# Patient Record
Sex: FEMALE | Race: WHITE | NOT HISPANIC OR LATINO | Employment: OTHER | URBAN - METROPOLITAN AREA
[De-identification: names, ages, dates, MRNs, and addresses within clinical notes are randomized per-mention and may not be internally consistent; named-entity substitution may affect disease eponyms.]

---

## 2017-01-03 ENCOUNTER — TELEPHONE (OUTPATIENT)
Dept: HEMATOLOGY/ONCOLOGY | Facility: CLINIC | Age: 82
End: 2017-01-03

## 2017-01-03 NOTE — TELEPHONE ENCOUNTER
Per Dr. Arredondo, confirmed for pt that she is in remission and still needs to take her last chemo treatment. Pt verbalized understanding.    ----- Message from Radha Womack sent at 1/3/2017  8:53 AM CST -----  Contact: self/[phone]  Patient would like to know if she received the correct information about her cancer being in remission because she is not sure if she needs to take her last treatment.  Please advise

## 2017-01-05 ENCOUNTER — OFFICE VISIT (OUTPATIENT)
Dept: CARDIOLOGY | Facility: CLINIC | Age: 82
End: 2017-01-05
Payer: MEDICARE

## 2017-01-05 VITALS
HEART RATE: 81 BPM | SYSTOLIC BLOOD PRESSURE: 180 MMHG | DIASTOLIC BLOOD PRESSURE: 81 MMHG | WEIGHT: 140 LBS | HEIGHT: 60 IN | BODY MASS INDEX: 27.48 KG/M2

## 2017-01-05 DIAGNOSIS — I10 HTN (HYPERTENSION), BENIGN: Primary | ICD-10-CM

## 2017-01-05 DIAGNOSIS — C83.30 DLBCL (DIFFUSE LARGE B CELL LYMPHOMA): ICD-10-CM

## 2017-01-05 DIAGNOSIS — G40.A09 NONINTRACTABLE ABSENCE EPILEPSY WITHOUT STATUS EPILEPTICUS: Chronic | ICD-10-CM

## 2017-01-05 DIAGNOSIS — T45.1X5A ANEMIA DUE TO ANTINEOPLASTIC CHEMOTHERAPY: ICD-10-CM

## 2017-01-05 DIAGNOSIS — I10 ESSENTIAL HYPERTENSION: Chronic | ICD-10-CM

## 2017-01-05 DIAGNOSIS — Z98.890 S/P EXPLORATORY LAPAROTOMY: ICD-10-CM

## 2017-01-05 DIAGNOSIS — D64.81 ANEMIA DUE TO ANTINEOPLASTIC CHEMOTHERAPY: ICD-10-CM

## 2017-01-05 DIAGNOSIS — E03.9 ACQUIRED HYPOTHYROIDISM: Chronic | ICD-10-CM

## 2017-01-05 PROCEDURE — 99213 OFFICE O/P EST LOW 20 MIN: CPT | Mod: PBBFAC,PO | Performed by: INTERNAL MEDICINE

## 2017-01-05 PROCEDURE — 99999 PR PBB SHADOW E&M-EST. PATIENT-LVL III: CPT | Mod: PBBFAC,,, | Performed by: INTERNAL MEDICINE

## 2017-01-05 PROCEDURE — 99204 OFFICE O/P NEW MOD 45 MIN: CPT | Mod: S$PBB,,, | Performed by: INTERNAL MEDICINE

## 2017-01-05 RX ORDER — CHLORTHALIDONE 25 MG/1
25 TABLET ORAL DAILY
Qty: 30 TABLET | Refills: 11 | Status: ON HOLD | OUTPATIENT
Start: 2017-01-05 | End: 2017-01-27 | Stop reason: HOSPADM

## 2017-01-05 RX ORDER — BENZONATATE 100 MG/1
100 CAPSULE ORAL 3 TIMES DAILY PRN
COMMUNITY
End: 2017-01-16 | Stop reason: ALTCHOICE

## 2017-01-05 NOTE — PROGRESS NOTES
Subjective:    Patient ID:  Annette Garcia is a 81 y.o. female who presents for evaluation of Consult and Hypertension      HPI  80 y/o female with hx of difficult to control HTN, anemia, acquired hypothyroidism, diffuse large B-cell lymphoma on chemo (last PET showed remission) and has one more chemo session scheduled for later this month. She presents for evaluation and management of HTN. She denies CP, dizziness, lightheadedness, syncope, orthopnea, PND, palps. No hx of JESSENIA and does not smoke. BP has been elevated during Chemo visits with 's. BP diary available and SBP ranging from 150's-190's with average in the high 160's and HR 70-80's. Follows low salt diet.     Review of Systems   Constitution: Positive for weakness. Negative for malaise/fatigue.   HENT: Negative for congestion and headaches.    Eyes: Negative for blurred vision.   Cardiovascular: Positive for leg swelling. Negative for chest pain, claudication, cyanosis, dyspnea on exertion, irregular heartbeat, near-syncope, orthopnea, palpitations, paroxysmal nocturnal dyspnea and syncope.   Respiratory: Negative for shortness of breath.    Endocrine: Negative for polyuria.   Hematologic/Lymphatic: Negative for bleeding problem.   Skin: Negative for itching and rash.   Musculoskeletal: Negative for joint swelling, muscle cramps and muscle weakness.   Gastrointestinal: Negative for abdominal pain, hematemesis, hematochezia, melena, nausea and vomiting.   Genitourinary: Negative for dysuria and hematuria.   Neurological: Negative for dizziness, focal weakness, light-headedness and loss of balance.   Psychiatric/Behavioral: Negative for depression. The patient is not nervous/anxious.         Objective:    Physical Exam   Constitutional: She is oriented to person, place, and time. She appears well-developed and well-nourished.   HENT:   Head: Normocephalic and atraumatic.   Neck: Neck supple. No JVD present.   Cardiovascular: Normal rate, regular  rhythm and normal heart sounds.    Pulses:       Carotid pulses are 2+ on the right side, and 2+ on the left side.       Radial pulses are 2+ on the right side, and 2+ on the left side.        Femoral pulses are 2+ on the right side, and 2+ on the left side.       Dorsalis pedis pulses are 2+ on the right side, and 2+ on the left side.        Posterior tibial pulses are 2+ on the right side, and 2+ on the left side.   Pulmonary/Chest: Effort normal and breath sounds normal.   Abdominal: Soft. Bowel sounds are normal.   Musculoskeletal: She exhibits edema.   Neurological: She is alert and oriented to person, place, and time.   Skin: Skin is warm and dry.   Psychiatric: She has a normal mood and affect. Her behavior is normal. Thought content normal.         Assessment:       1. HTN (hypertension), benign    2. Nonintractable absence epilepsy without status epilepticus    3. Essential hypertension    4. Anemia due to antineoplastic chemotherapy    5. DLBCL (diffuse large B cell lymphoma)    6. Acquired hypothyroidism    7. S/P exploratory laparotomy        80 y/o female with hx and presentation as above. Has difficult to control HTN. Will continue lisinopril 40 mg and carvedilol 12.5 mg BiD. Will add chlorthalidone 25 mg and assess response. BMP next week. Will order renal artery doppler to evaluate for renal artery stenosis. She was told to call clinic in 1 week with BP readings and will decide medication adjustment at that time.        Plan:       -Start chlorthalidone 25 mg daily  -Renal artery doppler  -BMP next week  -Pt to call clinic in 1 week with BP results

## 2017-01-05 NOTE — LETTER
January 5, 2017      Tyson Arredondo MD  [address] [place] [address]           [facility]  [address] [place] [address], [address]  [facility] [address]  Phone: [phone]  Fax: [phone]          Patient: Annette Garcia   MR Number: [id number]   YOB: 1935   Date of Visit: 1/5/2017       Dear Dr. Tyson Arredondo:    Thank you for referring Annette Garcia to me for evaluation. Attached you will find relevant portions of my assessment and plan of care.    If you have questions, please do not hesitate to call me. I look forward to following Annette Garcia along with you.    Sincerely,    Timmy Simmons MD    Enclosure  CC:  No Recipients    If you would like to receive this communication electronically, please contact [email or web address] or [phone] to request more information on [employer] access.    For providers and/or their staff who would like to refer a patient to [facility], please contact us through our one-stop-shop provider referral line, [facility], at [phone].    If you feel you have received this communication in error or would no longer like to receive these types of communications, please e-mail [email or web address]

## 2017-01-12 ENCOUNTER — HOSPITAL ENCOUNTER (OUTPATIENT)
Dept: RADIOLOGY | Facility: HOSPITAL | Age: 82
Discharge: HOME | End: 2017-01-12
Attending: INTERNAL MEDICINE
Payer: MEDICARE

## 2017-01-12 DIAGNOSIS — I10 HTN (HYPERTENSION), BENIGN: ICD-10-CM

## 2017-01-12 PROCEDURE — 93975 VASCULAR STUDY: CPT | Mod: TC,PO

## 2017-01-16 ENCOUNTER — LAB VISIT (OUTPATIENT)
Dept: LAB | Facility: HOSPITAL | Age: 82
End: 2017-01-16
Attending: INTERNAL MEDICINE
Payer: MEDICARE

## 2017-01-16 ENCOUNTER — OFFICE VISIT (OUTPATIENT)
Dept: HEMATOLOGY/ONCOLOGY | Facility: CLINIC | Age: 82
End: 2017-01-16
Payer: MEDICARE

## 2017-01-16 VITALS
BODY MASS INDEX: 29.33 KG/M2 | WEIGHT: 139.75 LBS | TEMPERATURE: 98 F | DIASTOLIC BLOOD PRESSURE: 60 MMHG | HEART RATE: 69 BPM | SYSTOLIC BLOOD PRESSURE: 130 MMHG | OXYGEN SATURATION: 96 % | HEIGHT: 58 IN

## 2017-01-16 DIAGNOSIS — D53.9 NUTRITIONAL ANEMIA: ICD-10-CM

## 2017-01-16 DIAGNOSIS — D64.81 ANEMIA DUE TO ANTINEOPLASTIC CHEMOTHERAPY: ICD-10-CM

## 2017-01-16 DIAGNOSIS — Z51.11 ENCOUNTER FOR ANTINEOPLASTIC CHEMOTHERAPY: ICD-10-CM

## 2017-01-16 DIAGNOSIS — T45.1X5A ANEMIA DUE TO ANTINEOPLASTIC CHEMOTHERAPY: ICD-10-CM

## 2017-01-16 DIAGNOSIS — C83.30 DLBCL (DIFFUSE LARGE B CELL LYMPHOMA): Primary | ICD-10-CM

## 2017-01-16 DIAGNOSIS — C83.30 DLBCL (DIFFUSE LARGE B CELL LYMPHOMA): ICD-10-CM

## 2017-01-16 DIAGNOSIS — I10 ESSENTIAL HYPERTENSION: Chronic | ICD-10-CM

## 2017-01-16 DIAGNOSIS — I70.1 BILATERAL RENAL ARTERY STENOSIS: ICD-10-CM

## 2017-01-16 LAB
ALBUMIN SERPL BCP-MCNC: 3.6 G/DL
ALP SERPL-CCNC: 67 U/L
ALT SERPL W/O P-5'-P-CCNC: 7 U/L
ANION GAP SERPL CALC-SCNC: 9 MMOL/L
AST SERPL-CCNC: 12 U/L
BASOPHILS # BLD AUTO: 0.02 K/UL
BASOPHILS NFR BLD: 0.4 %
BILIRUB SERPL-MCNC: 0.2 MG/DL
BUN SERPL-MCNC: 24 MG/DL
CALCIUM SERPL-MCNC: 9.1 MG/DL
CHLORIDE SERPL-SCNC: 93 MMOL/L
CO2 SERPL-SCNC: 28 MMOL/L
CREAT SERPL-MCNC: 0.8 MG/DL
DIFFERENTIAL METHOD: ABNORMAL
EOSINOPHIL # BLD AUTO: 0 K/UL
EOSINOPHIL NFR BLD: 0.7 %
ERYTHROCYTE [DISTWIDTH] IN BLOOD BY AUTOMATED COUNT: 16 %
EST. GFR  (AFRICAN AMERICAN): >60 ML/MIN/1.73 M^2
EST. GFR  (NON AFRICAN AMERICAN): >60 ML/MIN/1.73 M^2
GLUCOSE SERPL-MCNC: 132 MG/DL
HCT VFR BLD AUTO: 24.7 %
HGB BLD-MCNC: 8.4 G/DL
LDH SERPL L TO P-CCNC: 140 U/L
LYMPHOCYTES # BLD AUTO: 0.4 K/UL
LYMPHOCYTES NFR BLD: 9.1 %
MAGNESIUM SERPL-MCNC: 1.6 MG/DL
MCH RBC QN AUTO: 35.6 PG
MCHC RBC AUTO-ENTMCNC: 34 %
MCV RBC AUTO: 105 FL
MONOCYTES # BLD AUTO: 0.6 K/UL
MONOCYTES NFR BLD: 14 %
NEUTROPHILS # BLD AUTO: 3.4 K/UL
NEUTROPHILS NFR BLD: 75.6 %
PHOSPHATE SERPL-MCNC: 4.3 MG/DL
PLATELET # BLD AUTO: 202 K/UL
PMV BLD AUTO: 10 FL
POTASSIUM SERPL-SCNC: 3.8 MMOL/L
PROT SERPL-MCNC: 6 G/DL
RBC # BLD AUTO: 2.36 M/UL
SODIUM SERPL-SCNC: 130 MMOL/L
URATE SERPL-MCNC: 5.5 MG/DL
WBC # BLD AUTO: 4.51 K/UL

## 2017-01-16 PROCEDURE — 99213 OFFICE O/P EST LOW 20 MIN: CPT | Mod: PBBFAC,PO | Performed by: INTERNAL MEDICINE

## 2017-01-16 PROCEDURE — 36415 COLL VENOUS BLD VENIPUNCTURE: CPT

## 2017-01-16 PROCEDURE — 84100 ASSAY OF PHOSPHORUS: CPT

## 2017-01-16 PROCEDURE — 99214 OFFICE O/P EST MOD 30 MIN: CPT | Mod: S$PBB,,, | Performed by: INTERNAL MEDICINE

## 2017-01-16 PROCEDURE — 85025 COMPLETE CBC W/AUTO DIFF WBC: CPT

## 2017-01-16 PROCEDURE — 83615 LACTATE (LD) (LDH) ENZYME: CPT

## 2017-01-16 PROCEDURE — 84550 ASSAY OF BLOOD/URIC ACID: CPT

## 2017-01-16 PROCEDURE — 83735 ASSAY OF MAGNESIUM: CPT

## 2017-01-16 PROCEDURE — 99999 PR PBB SHADOW E&M-EST. PATIENT-LVL III: CPT | Mod: PBBFAC,,, | Performed by: INTERNAL MEDICINE

## 2017-01-16 PROCEDURE — 80053 COMPREHEN METABOLIC PANEL: CPT

## 2017-01-16 RX ORDER — ACETAMINOPHEN 325 MG/1
650 TABLET ORAL
Status: CANCELLED | OUTPATIENT
Start: 2017-01-17

## 2017-01-16 RX ORDER — HEPARIN 100 UNIT/ML
500 SYRINGE INTRAVENOUS
Status: CANCELLED | OUTPATIENT
Start: 2017-01-17

## 2017-01-16 RX ORDER — DOXORUBICIN HYDROCHLORIDE 2 MG/ML
50 INJECTION, SOLUTION INTRAVENOUS
Status: CANCELLED | OUTPATIENT
Start: 2017-01-17

## 2017-01-16 RX ORDER — FAMOTIDINE 10 MG/ML
20 INJECTION INTRAVENOUS
Status: CANCELLED | OUTPATIENT
Start: 2017-01-17

## 2017-01-16 RX ORDER — SODIUM CHLORIDE 0.9 % (FLUSH) 0.9 %
10 SYRINGE (ML) INJECTION
Status: CANCELLED | OUTPATIENT
Start: 2017-01-17

## 2017-01-16 NOTE — PROGRESS NOTES
Subjective:       Patient ID: Annette Garcia is a 81 y.o. female.    Chief Complaint: DLBCL (stat lab results)    HPI     She is here for follow-up of Diffuse large B-cell lymphoma.  She was having abdominal discomfort for over 4 months and a CT scan done on 8/15/16 showed dilated loops of small bowel. She underwent explorative laparotomy (8/23) with resection of a segment of her small bowel by  - 2 different areas of strictures was noticed intraoperatively. Pathology revealed diffuse large B-cell lymphoma - germinal center origin (CD10 negative, BCL6 positive, MUM1 negative). Ki-67 was elevated at 75%.    PET scan showed stage III disease. No lymphoma on bone marrow biopsy.    She started R-CHOP chemotherapy 10/4/16.    PET scan (12/20/16) - Complete resolution of the hypermetabolic abnormality.     Overall tolerating chemo well.    Review of Systems   Constitutional: Negative for appetite change, fatigue, fever and unexpected weight change.   HENT: Negative for facial swelling and nosebleeds.    Eyes: Negative for photophobia and pain.   Respiratory: Negative for cough and shortness of breath.    Cardiovascular: Negative for chest pain and leg swelling.   Gastrointestinal: Negative for abdominal pain, blood in stool and nausea.   Genitourinary: Negative for dysuria and hematuria.   Skin: Negative for color change and rash.   Neurological: Negative for seizures, weakness and headaches.   Hematological: Negative for adenopathy. Does not bruise/bleed easily.       Objective:      Physical Exam   Constitutional: She is oriented to person, place, and time. She appears well-developed and well-nourished. No distress.   HENT:   Head: Normocephalic and atraumatic.   Right Ear: Tympanic membrane, external ear and ear canal normal.   Left Ear: Tympanic membrane, external ear and ear canal normal.   Nose: Nose normal. No mucosal edema, sinus tenderness, septal deviation or nasal septal hematoma. No epistaxis.  Right sinus exhibits no maxillary sinus tenderness and no frontal sinus tenderness. Left sinus exhibits no maxillary sinus tenderness and no frontal sinus tenderness.   Mouth/Throat: Oropharynx is clear and moist and mucous membranes are normal. Mucous membranes are not cyanotic. No oral lesions. No dental caries. No oropharyngeal exudate.   Hard and soft palate, tongue and posterior pharyngeal wall unremarkable   Eyes: Conjunctivae and lids are normal. No scleral icterus.   Neck: Trachea normal. Neck supple. No tracheal tenderness present. No tracheal deviation present. No thyroid mass (thyroid is non-tender) present.   Cardiovascular: Normal rate, regular rhythm, normal heart sounds, intact distal pulses and normal pulses.  Exam reveals no gallop.    No murmur heard.  No edema   Pulmonary/Chest: Effort normal and breath sounds normal. No accessory muscle usage or stridor. No respiratory distress. She has no decreased breath sounds. She has no wheezes. She has no rhonchi. She has no rales.   Abdominal: Soft. Bowel sounds are normal. She exhibits no distension and no mass. There is no hepatosplenomegaly, splenomegaly or hepatomegaly. There is no tenderness.       Musculoskeletal: She exhibits no edema or tenderness.   Lymphadenopathy:        Head (right side): No submental and no submandibular adenopathy present.        Head (left side): No submental and no submandibular adenopathy present.     She has no cervical adenopathy.     She has no axillary adenopathy.        Right: No supraclavicular adenopathy present.        Left: No supraclavicular adenopathy present.   Neurological: She is alert and oriented to person, place, and time. She has normal strength. She is not disoriented. No cranial nerve deficit or sensory deficit.   Skin: Skin is warm and intact. No petechiae and no rash noted. She is not diaphoretic. No cyanosis. No pallor. Nails show no clubbing.   Psychiatric: She has a normal mood and affect. Her  speech is normal and behavior is normal. Judgment and thought content normal. Her mood appears not anxious. She expresses no homicidal and no suicidal ideation.       Assessment:     1. DLBCL (diffuse large B cell lymphoma)    2. Encounter for antineoplastic chemotherapy    3. Essential hypertension    4. Bilateral renal artery stenosis    5. Anemia due to antineoplastic chemotherapy          Plan:   In summary this is a 81-year-old female with stage III diffuse large B-cell lymphoma.  Labs reviewed.  Worsening anemia from chemotherapy noted.  Will monitor and continue to hold off on starting procrit.  She is asymptomatic.    Proceed with cycle #6 of Rituxan-CHOP scheduled for tomorrow with growth factor support.  This will be her last chemotherapy.    She'll continue to follow-up with Dr. Simmons, cardiology.  Recent renal ultrasound shows bilateral renal artery stenosis.    She will continue bowel regimen of MiraLAX and Colace to prevent constipation    Given worsening anemia secondary to chemotherapy, will repeat her blood counts along with a B12 and folate level in 6 weeks.  Plan to repeat a PET scan in 3 months.  Will discuss port removal after the next PET scan.

## 2017-01-17 ENCOUNTER — INFUSION (OUTPATIENT)
Dept: INFUSION THERAPY | Facility: HOSPITAL | Age: 82
End: 2017-01-17
Attending: INTERNAL MEDICINE
Payer: MEDICARE

## 2017-01-17 VITALS
SYSTOLIC BLOOD PRESSURE: 163 MMHG | DIASTOLIC BLOOD PRESSURE: 73 MMHG | WEIGHT: 139.75 LBS | RESPIRATION RATE: 18 BRPM | BODY MASS INDEX: 29.33 KG/M2 | TEMPERATURE: 97 F | HEART RATE: 68 BPM | HEIGHT: 58 IN

## 2017-01-17 DIAGNOSIS — I10 ESSENTIAL HYPERTENSION: Primary | ICD-10-CM

## 2017-01-17 DIAGNOSIS — C83.30 DLBCL (DIFFUSE LARGE B CELL LYMPHOMA): ICD-10-CM

## 2017-01-17 DIAGNOSIS — Z51.11 ENCOUNTER FOR ANTINEOPLASTIC CHEMOTHERAPY: Primary | ICD-10-CM

## 2017-01-17 PROCEDURE — 96413 CHEMO IV INFUSION 1 HR: CPT

## 2017-01-17 PROCEDURE — 96417 CHEMO IV INFUS EACH ADDL SEQ: CPT

## 2017-01-17 PROCEDURE — 96415 CHEMO IV INFUSION ADDL HR: CPT

## 2017-01-17 PROCEDURE — 25000003 PHARM REV CODE 250: Performed by: INTERNAL MEDICINE

## 2017-01-17 PROCEDURE — 96411 CHEMO IV PUSH ADDL DRUG: CPT

## 2017-01-17 PROCEDURE — 96377 APPLICATON ON-BODY INJECTOR: CPT

## 2017-01-17 PROCEDURE — 63600175 PHARM REV CODE 636 W HCPCS: Performed by: INTERNAL MEDICINE

## 2017-01-17 PROCEDURE — 96375 TX/PRO/DX INJ NEW DRUG ADDON: CPT

## 2017-01-17 PROCEDURE — 96367 TX/PROPH/DG ADDL SEQ IV INF: CPT

## 2017-01-17 RX ORDER — SODIUM CHLORIDE 0.9 % (FLUSH) 0.9 %
10 SYRINGE (ML) INJECTION
Status: DISCONTINUED | OUTPATIENT
Start: 2017-01-17 | End: 2017-01-17 | Stop reason: HOSPADM

## 2017-01-17 RX ORDER — ACETAMINOPHEN 325 MG/1
650 TABLET ORAL
Status: COMPLETED | OUTPATIENT
Start: 2017-01-17 | End: 2017-01-17

## 2017-01-17 RX ORDER — HEPARIN 100 UNIT/ML
500 SYRINGE INTRAVENOUS
Status: DISCONTINUED | OUTPATIENT
Start: 2017-01-17 | End: 2017-01-17 | Stop reason: HOSPADM

## 2017-01-17 RX ORDER — MEPERIDINE HYDROCHLORIDE 50 MG/ML
25 INJECTION INTRAMUSCULAR; INTRAVENOUS; SUBCUTANEOUS
Status: DISCONTINUED | OUTPATIENT
Start: 2017-01-17 | End: 2017-01-17 | Stop reason: HOSPADM

## 2017-01-17 RX ORDER — FAMOTIDINE 10 MG/ML
20 INJECTION INTRAVENOUS
Status: COMPLETED | OUTPATIENT
Start: 2017-01-17 | End: 2017-01-17

## 2017-01-17 RX ORDER — DOXORUBICIN HYDROCHLORIDE 2 MG/ML
50 INJECTION, SOLUTION INTRAVENOUS
Status: COMPLETED | OUTPATIENT
Start: 2017-01-17 | End: 2017-01-17

## 2017-01-17 RX ADMIN — DIPHENHYDRAMINE HYDROCHLORIDE 50 MG: 50 INJECTION, SOLUTION INTRAMUSCULAR; INTRAVENOUS at 08:01

## 2017-01-17 RX ADMIN — CYCLOPHOSPHAMIDE 1215 MG: 1 INJECTION, POWDER, FOR SOLUTION INTRAVENOUS; ORAL at 12:01

## 2017-01-17 RX ADMIN — RITUXIMAB 608 MG: 10 INJECTION, SOLUTION INTRAVENOUS at 09:01

## 2017-01-17 RX ADMIN — ACETAMINOPHEN 650 MG: 325 TABLET ORAL at 08:01

## 2017-01-17 RX ADMIN — VINCRISTINE SULFATE 2 MG: 1 INJECTION, SOLUTION INTRAVENOUS at 12:01

## 2017-01-17 RX ADMIN — PALONOSETRON HYDROCHLORIDE 0.25 MG: 0.25 INJECTION INTRAVENOUS at 09:01

## 2017-01-17 RX ADMIN — HEPARIN SODIUM (PORCINE) LOCK FLUSH IV SOLN 100 UNIT/ML 500 UNITS: 100 SOLUTION at 01:01

## 2017-01-17 RX ADMIN — FAMOTIDINE 20 MG: 10 INJECTION INTRAVENOUS at 08:01

## 2017-01-17 RX ADMIN — SODIUM CHLORIDE: 0.9 INJECTION, SOLUTION INTRAVENOUS at 08:01

## 2017-01-17 RX ADMIN — DOXORUBICIN HYDROCHLORIDE 82 MG: 2 INJECTION, SOLUTION INTRAVENOUS at 12:01

## 2017-01-17 RX ADMIN — PEGFILGRASTIM 6 MG: KIT SUBCUTANEOUS at 01:01

## 2017-01-17 NOTE — TELEPHONE ENCOUNTER
----- Message from Timmy Simmons MD sent at 1/13/2017  1:46 PM CST -----  Please let Ms Garcia know that her labs are acceptable. Please also let her know that her kidney artery ultrasound showed possible blockages in her kidney arteries that we will discuss further at her 1 month clinic appointment. Also she  Needs to increase coreg to 25 mg BiD after I reviewed her BP log.  Thanks  ALEX

## 2017-01-17 NOTE — TELEPHONE ENCOUNTER
----- Message from Kath Young sent at 1/13/2017 12:23 PM CST -----  Patient's daughter, Beatris, called.  Patient no. [phone]    Please call with lab results and ultrasound results.   Patient has an appointment with Dr. Arredondo on Monday, 1/16/17.

## 2017-01-17 NOTE — NURSING
Pt tolerated chemo well. No adverse reaction noted. Pt education reinforced on chemo regimen, side effects, what to expect, and when to call _[facility]_. Pt verbalized understanding. I reviewed pt calendar w/ pt and understanding verbalized. PAC deaccessed and flushed w/ NS and heparin per protocol.

## 2017-01-18 RX ORDER — CARVEDILOL 12.5 MG/1
12.5 TABLET ORAL 2 TIMES DAILY WITH MEALS
Qty: 60 TABLET | Refills: 2 | Status: CANCELLED | OUTPATIENT
Start: 2017-01-18 | End: 2018-01-18

## 2017-01-18 NOTE — TELEPHONE ENCOUNTER
----- Message from Allie Blancas sent at 1/17/2017  3:32 PM CST -----  Contact: daughter /[phone]  Pt is awaiting her new prescription for carvedilol (COREG) 12.5 MG tablet/for today  /[facility] [place] location [phone]/Pt request a nurse call back when its done

## 2017-01-18 NOTE — TELEPHONE ENCOUNTER
----- Message from Kath Young sent at 1/18/2017  9:19 AM CST -----  Patient no. [phone]    Carvedilol 12.5 mg is not at [facility] in [place] yet.   Please authorize.

## 2017-01-19 RX ORDER — CARVEDILOL 25 MG/1
25 TABLET ORAL 2 TIMES DAILY WITH MEALS
COMMUNITY
End: 2017-01-19 | Stop reason: SDUPTHER

## 2017-01-19 RX ORDER — CARVEDILOL 25 MG/1
25 TABLET ORAL 2 TIMES DAILY WITH MEALS
Qty: 60 TABLET | Refills: 11 | Status: SHIPPED | OUTPATIENT
Start: 2017-01-19 | End: 2017-09-14 | Stop reason: SDUPTHER

## 2017-01-26 ENCOUNTER — HOSPITAL ENCOUNTER (INPATIENT)
Facility: HOSPITAL | Age: 82
LOS: 1 days | Discharge: HOME | DRG: 640 | End: 2017-01-27
Attending: EMERGENCY MEDICINE | Admitting: HOSPITALIST
Payer: MEDICARE

## 2017-01-26 ENCOUNTER — OFFICE VISIT (OUTPATIENT)
Dept: CARDIOLOGY | Facility: CLINIC | Age: 82
End: 2017-01-26
Payer: MEDICARE

## 2017-01-26 VITALS
HEIGHT: 58 IN | SYSTOLIC BLOOD PRESSURE: 81 MMHG | BODY MASS INDEX: 28.55 KG/M2 | OXYGEN SATURATION: 99 % | DIASTOLIC BLOOD PRESSURE: 45 MMHG | HEART RATE: 70 BPM | WEIGHT: 136 LBS

## 2017-01-26 DIAGNOSIS — C83.30 DLBCL (DIFFUSE LARGE B CELL LYMPHOMA): ICD-10-CM

## 2017-01-26 DIAGNOSIS — E87.1 DEHYDRATION WITH HYPONATREMIA: ICD-10-CM

## 2017-01-26 DIAGNOSIS — E87.1 HYPONATREMIA: Primary | ICD-10-CM

## 2017-01-26 DIAGNOSIS — E86.0 DEHYDRATION WITH HYPONATREMIA: ICD-10-CM

## 2017-01-26 DIAGNOSIS — D64.81 ANEMIA DUE TO ANTINEOPLASTIC CHEMOTHERAPY: ICD-10-CM

## 2017-01-26 DIAGNOSIS — R53.1 WEAKNESS: ICD-10-CM

## 2017-01-26 DIAGNOSIS — T45.1X5A ANEMIA DUE TO ANTINEOPLASTIC CHEMOTHERAPY: ICD-10-CM

## 2017-01-26 DIAGNOSIS — E86.0 DEHYDRATION: ICD-10-CM

## 2017-01-26 DIAGNOSIS — I70.1 BILATERAL RENAL ARTERY STENOSIS: ICD-10-CM

## 2017-01-26 DIAGNOSIS — I10 ESSENTIAL HYPERTENSION: Primary | Chronic | ICD-10-CM

## 2017-01-26 DIAGNOSIS — D70.1 CHEMOTHERAPY INDUCED NEUTROPENIA: ICD-10-CM

## 2017-01-26 DIAGNOSIS — E86.9 VOLUME DEPLETION, GASTROINTESTINAL LOSS: ICD-10-CM

## 2017-01-26 DIAGNOSIS — D64.9 SYMPTOMATIC ANEMIA: ICD-10-CM

## 2017-01-26 DIAGNOSIS — T45.1X5A CHEMOTHERAPY INDUCED NEUTROPENIA: ICD-10-CM

## 2017-01-26 LAB
ABO GROUP BLD: NORMAL
ALBUMIN SERPL BCP-MCNC: 3.9 G/DL
ALP SERPL-CCNC: 84 U/L
ALT SERPL W/O P-5'-P-CCNC: 16 U/L
ANION GAP SERPL CALC-SCNC: 11 MMOL/L
ANISOCYTOSIS BLD QL SMEAR: SLIGHT
AST SERPL-CCNC: 15 U/L
BASOPHILS # BLD AUTO: ABNORMAL K/UL
BASOPHILS NFR BLD: 0 %
BILIRUB SERPL-MCNC: 0.6 MG/DL
BILIRUB UR QL STRIP: NEGATIVE
BLD GP AB SCN CELLS X3 SERPL QL: NORMAL
BLD PROD TYP BPU: NORMAL
BLD PROD TYP BPU: NORMAL
BLOOD UNIT EXPIRATION DATE: NORMAL
BLOOD UNIT EXPIRATION DATE: NORMAL
BLOOD UNIT TYPE CODE: 5100
BLOOD UNIT TYPE CODE: 5100
BLOOD UNIT TYPE: NORMAL
BLOOD UNIT TYPE: NORMAL
BNP SERPL-MCNC: 60 PG/ML
BUN SERPL-MCNC: 25 MG/DL
CALCIUM SERPL-MCNC: 9.2 MG/DL
CHLORIDE SERPL-SCNC: 88 MMOL/L
CLARITY UR: CLEAR
CO2 SERPL-SCNC: 26 MMOL/L
CODING SYSTEM: NORMAL
CODING SYSTEM: NORMAL
COLOR UR: YELLOW
CREAT SERPL-MCNC: 1 MG/DL
DIFFERENTIAL METHOD: ABNORMAL
DISPENSE STATUS: NORMAL
DISPENSE STATUS: NORMAL
EOSINOPHIL # BLD AUTO: ABNORMAL K/UL
EOSINOPHIL NFR BLD: 4 %
ERYTHROCYTE [DISTWIDTH] IN BLOOD BY AUTOMATED COUNT: 14.5 %
EST. GFR  (AFRICAN AMERICAN): >60 ML/MIN/1.73 M^2
EST. GFR  (NON AFRICAN AMERICAN): 54 ML/MIN/1.73 M^2
FLUAV AG SPEC QL IA: NEGATIVE
FLUBV AG SPEC QL IA: NEGATIVE
GLUCOSE SERPL-MCNC: 136 MG/DL
GLUCOSE UR QL STRIP: NEGATIVE
HCT VFR BLD AUTO: 22.3 %
HGB BLD-MCNC: 7.8 G/DL
HGB UR QL STRIP: NEGATIVE
KETONES UR QL STRIP: NEGATIVE
LACTATE SERPL-SCNC: 2.3 MMOL/L
LEUKOCYTE ESTERASE UR QL STRIP: NEGATIVE
LYMPHOCYTES # BLD AUTO: ABNORMAL K/UL
LYMPHOCYTES NFR BLD: 33.3 %
MCH RBC QN AUTO: 35 PG
MCHC RBC AUTO-ENTMCNC: 35 %
MCV RBC AUTO: 100 FL
METAMYELOCYTES NFR BLD MANUAL: 2.7 %
MONOCYTES # BLD AUTO: ABNORMAL K/UL
MONOCYTES NFR BLD: 33.4 %
MYELOCYTES NFR BLD MANUAL: 1.3 %
NEUTROPHILS NFR BLD: 17.3 %
NEUTS BAND NFR BLD MANUAL: 8 %
NITRITE UR QL STRIP: NEGATIVE
OB PNL STL: NEGATIVE
PH UR STRIP: 6 [PH] (ref 5–8)
PHENOBARB SERPL-MCNC: 10.8 UG/DL
PLATELET # BLD AUTO: 44 K/UL
PLATELET BLD QL SMEAR: ABNORMAL
PMV BLD AUTO: 11.3 FL
POTASSIUM SERPL-SCNC: 4.1 MMOL/L
PROT SERPL-MCNC: 6.4 G/DL
PROT UR QL STRIP: NEGATIVE
RBC # BLD AUTO: 2.23 M/UL
RH BLD: NORMAL
SODIUM SERPL-SCNC: 125 MMOL/L
SP GR UR STRIP: 1.02 (ref 1–1.03)
SPECIMEN SOURCE: NORMAL
TRANS ERYTHROCYTES VOL PATIENT: NORMAL ML
TRANS ERYTHROCYTES VOL PATIENT: NORMAL ML
TROPONIN I SERPL DL<=0.01 NG/ML-MCNC: 0.04 NG/ML
TSH SERPL DL<=0.005 MIU/L-ACNC: 1.97 UIU/ML
URN SPEC COLLECT METH UR: NORMAL
UROBILINOGEN UR STRIP-ACNC: NEGATIVE EU/DL
WBC # BLD AUTO: 0.29 K/UL

## 2017-01-26 PROCEDURE — 81003 URINALYSIS AUTO W/O SCOPE: CPT

## 2017-01-26 PROCEDURE — 80053 COMPREHEN METABOLIC PANEL: CPT

## 2017-01-26 PROCEDURE — 84484 ASSAY OF TROPONIN QUANT: CPT

## 2017-01-26 PROCEDURE — 25000003 PHARM REV CODE 250: Performed by: NURSE PRACTITIONER

## 2017-01-26 PROCEDURE — 83880 ASSAY OF NATRIURETIC PEPTIDE: CPT

## 2017-01-26 PROCEDURE — 11000001 HC ACUTE MED/SURG PRIVATE ROOM

## 2017-01-26 PROCEDURE — 85027 COMPLETE CBC AUTOMATED: CPT

## 2017-01-26 PROCEDURE — 96374 THER/PROPH/DIAG INJ IV PUSH: CPT

## 2017-01-26 PROCEDURE — 99284 EMERGENCY DEPT VISIT MOD MDM: CPT | Mod: 25,27

## 2017-01-26 PROCEDURE — 96361 HYDRATE IV INFUSION ADD-ON: CPT

## 2017-01-26 PROCEDURE — 93005 ELECTROCARDIOGRAM TRACING: CPT

## 2017-01-26 PROCEDURE — 99999 PR PBB SHADOW E&M-EST. PATIENT-LVL III: CPT | Mod: PBBFAC,,, | Performed by: INTERNAL MEDICINE

## 2017-01-26 PROCEDURE — 87040 BLOOD CULTURE FOR BACTERIA: CPT | Mod: 59

## 2017-01-26 PROCEDURE — 86901 BLOOD TYPING SEROLOGIC RH(D): CPT

## 2017-01-26 PROCEDURE — 86850 RBC ANTIBODY SCREEN: CPT

## 2017-01-26 PROCEDURE — 83605 ASSAY OF LACTIC ACID: CPT

## 2017-01-26 PROCEDURE — 99213 OFFICE O/P EST LOW 20 MIN: CPT | Mod: PBBFAC,PO | Performed by: INTERNAL MEDICINE

## 2017-01-26 PROCEDURE — 36430 TRANSFUSION BLD/BLD COMPNT: CPT

## 2017-01-26 PROCEDURE — 86920 COMPATIBILITY TEST SPIN: CPT

## 2017-01-26 PROCEDURE — 99213 OFFICE O/P EST LOW 20 MIN: CPT | Mod: S$PBB,,, | Performed by: INTERNAL MEDICINE

## 2017-01-26 PROCEDURE — 84443 ASSAY THYROID STIM HORMONE: CPT

## 2017-01-26 PROCEDURE — 87400 INFLUENZA A/B EACH AG IA: CPT | Mod: 59

## 2017-01-26 PROCEDURE — 96375 TX/PRO/DX INJ NEW DRUG ADDON: CPT

## 2017-01-26 PROCEDURE — P9021 RED BLOOD CELLS UNIT: HCPCS

## 2017-01-26 PROCEDURE — 36415 COLL VENOUS BLD VENIPUNCTURE: CPT

## 2017-01-26 PROCEDURE — 80184 ASSAY OF PHENOBARBITAL: CPT

## 2017-01-26 PROCEDURE — 82272 OCCULT BLD FECES 1-3 TESTS: CPT

## 2017-01-26 PROCEDURE — 86900 BLOOD TYPING SEROLOGIC ABO: CPT

## 2017-01-26 PROCEDURE — 63600175 PHARM REV CODE 636 W HCPCS: Performed by: NURSE PRACTITIONER

## 2017-01-26 PROCEDURE — 99223 1ST HOSP IP/OBS HIGH 75: CPT | Mod: GC,,, | Performed by: INTERNAL MEDICINE

## 2017-01-26 PROCEDURE — 63600175 PHARM REV CODE 636 W HCPCS: Performed by: EMERGENCY MEDICINE

## 2017-01-26 PROCEDURE — 85007 BL SMEAR W/DIFF WBC COUNT: CPT

## 2017-01-26 PROCEDURE — 83605 ASSAY OF LACTIC ACID: CPT | Mod: 91

## 2017-01-26 RX ORDER — IBUPROFEN 200 MG
16 TABLET ORAL
Status: DISCONTINUED | OUTPATIENT
Start: 2017-01-26 | End: 2017-01-27 | Stop reason: HOSPADM

## 2017-01-26 RX ORDER — PHENOBARBITAL 32.4 MG/1
64.8 TABLET ORAL NIGHTLY
Status: DISCONTINUED | OUTPATIENT
Start: 2017-01-26 | End: 2017-01-27 | Stop reason: HOSPADM

## 2017-01-26 RX ORDER — DOCUSATE SODIUM 100 MG/1
100 CAPSULE, LIQUID FILLED ORAL DAILY
Status: DISCONTINUED | OUTPATIENT
Start: 2017-01-27 | End: 2017-01-27 | Stop reason: HOSPADM

## 2017-01-26 RX ORDER — CARBAMAZEPINE 100 MG/1
200 CAPSULE, EXTENDED RELEASE ORAL NIGHTLY
Status: DISCONTINUED | OUTPATIENT
Start: 2017-01-26 | End: 2017-01-27 | Stop reason: HOSPADM

## 2017-01-26 RX ORDER — DIPHENHYDRAMINE HYDROCHLORIDE 50 MG/ML
12.5 INJECTION INTRAMUSCULAR; INTRAVENOUS
Status: COMPLETED | OUTPATIENT
Start: 2017-01-26 | End: 2017-01-26

## 2017-01-26 RX ORDER — ACETAMINOPHEN 325 MG/1
325 TABLET ORAL EVERY 4 HOURS PRN
Status: DISCONTINUED | OUTPATIENT
Start: 2017-01-26 | End: 2017-01-27 | Stop reason: HOSPADM

## 2017-01-26 RX ORDER — CETIRIZINE HYDROCHLORIDE 5 MG/1
10 TABLET ORAL DAILY
Status: DISCONTINUED | OUTPATIENT
Start: 2017-01-27 | End: 2017-01-27 | Stop reason: HOSPADM

## 2017-01-26 RX ORDER — IBUPROFEN 200 MG
24 TABLET ORAL
Status: DISCONTINUED | OUTPATIENT
Start: 2017-01-26 | End: 2017-01-27 | Stop reason: HOSPADM

## 2017-01-26 RX ORDER — ONDANSETRON 2 MG/ML
4 INJECTION INTRAMUSCULAR; INTRAVENOUS EVERY 12 HOURS PRN
Status: DISCONTINUED | OUTPATIENT
Start: 2017-01-26 | End: 2017-01-27 | Stop reason: HOSPADM

## 2017-01-26 RX ORDER — CODEINE PHOSPHATE AND GUAIFENESIN 10; 100 MG/5ML; MG/5ML
5 SOLUTION ORAL EVERY 4 HOURS PRN
Status: DISCONTINUED | OUTPATIENT
Start: 2017-01-26 | End: 2017-01-26

## 2017-01-26 RX ORDER — ONDANSETRON 4 MG/1
4 TABLET, ORALLY DISINTEGRATING ORAL EVERY 6 HOURS PRN
Status: DISCONTINUED | OUTPATIENT
Start: 2017-01-26 | End: 2017-01-27 | Stop reason: HOSPADM

## 2017-01-26 RX ORDER — SODIUM CHLORIDE 9 MG/ML
INJECTION, SOLUTION INTRAVENOUS CONTINUOUS
Status: DISCONTINUED | OUTPATIENT
Start: 2017-01-26 | End: 2017-01-27 | Stop reason: HOSPADM

## 2017-01-26 RX ORDER — LEVOTHYROXINE SODIUM 50 UG/1
100 TABLET ORAL
Status: DISCONTINUED | OUTPATIENT
Start: 2017-01-27 | End: 2017-01-27 | Stop reason: HOSPADM

## 2017-01-26 RX ORDER — ONDANSETRON 2 MG/ML
4 INJECTION INTRAMUSCULAR; INTRAVENOUS
Status: COMPLETED | OUTPATIENT
Start: 2017-01-26 | End: 2017-01-26

## 2017-01-26 RX ORDER — HYDROCODONE BITARTRATE AND ACETAMINOPHEN 500; 5 MG/1; MG/1
TABLET ORAL
Status: DISCONTINUED | OUTPATIENT
Start: 2017-01-26 | End: 2017-01-27 | Stop reason: HOSPADM

## 2017-01-26 RX ORDER — GLUCAGON 1 MG
1 KIT INJECTION
Status: DISCONTINUED | OUTPATIENT
Start: 2017-01-26 | End: 2017-01-27 | Stop reason: HOSPADM

## 2017-01-26 RX ADMIN — SODIUM CHLORIDE 1000 ML: 0.9 INJECTION, SOLUTION INTRAVENOUS at 10:01

## 2017-01-26 RX ADMIN — DIPHENHYDRAMINE HYDROCHLORIDE 12.5 MG: 50 INJECTION, SOLUTION INTRAMUSCULAR; INTRAVENOUS at 04:01

## 2017-01-26 RX ADMIN — PHENOBARBITAL 64.8 MG: 32.4 TABLET ORAL at 09:01

## 2017-01-26 RX ADMIN — SODIUM CHLORIDE: 0.9 INJECTION, SOLUTION INTRAVENOUS at 03:01

## 2017-01-26 RX ADMIN — ONDANSETRON 4 MG: 2 INJECTION INTRAMUSCULAR; INTRAVENOUS at 10:01

## 2017-01-26 RX ADMIN — CARBAMAZEPINE 200 MG: 100 CAPSULE, EXTENDED RELEASE ORAL at 09:01

## 2017-01-26 NOTE — ASSESSMENT & PLAN NOTE
Difficult to treat hypertension at baseline. On Coreg 25 mg BID, Lisinopril 40 mg daily and chlorthalidone 25 mg BID with meals. Hypotensive on admit. Holding.

## 2017-01-26 NOTE — ED PROVIDER NOTES
Encounter Date: 1/26/2017       History     Chief Complaint   Patient presents with    Fatigue     weakness and hypotension. lymphoma patient, last chemo patient 1/17, followed by Dr. Arredondo     Review of patient's allergies indicates:   Allergen Reactions    Adhesive Itching and Blisters    Penicillins Anaphylaxis    Tramadol      May cause seizures    Avelox [moxifloxacin] Rash     Facial and arm itching and redness. Pt states throat closes when given.    Amoxil [amoxicillin]     Aspridrox [aspirin, buffered]     Codeine     Keflex [cephalexin]     Norvasc [amlodipine]     Red dye Hives    Sulfa (sulfonamide antibiotics)     Tylenol [acetaminophen]      Has reaction to Tylenol with red dye and unable to take Extra Strength Tylenol/ CAN ONLY TOLERATE REG STRENGTH TYLENOL     HPI Comments: 80yo female on Chemo for Non-Hodgkin's lymphoma presents for progressively worse generalized weakness for five days.  Her last chemo was 1/17.  Pt denies fever, chills, sore throat, nasal congestion, CP, SOB, palpitations, abd pain, vomiting, diarrhea, dysuria, and rash.  Pt reports nausea but has not used any antiemetics. Pt's daughter reports that she has not been eating or drinking much since her last treatment.  Pt reports a history of dehydration.      Patient is a 81 y.o. female presenting with the following complaint: general illness. The history is provided by the patient, a relative and medical records.   Illness    The current episode started several days ago. The problem occurs continuously. The problem has been gradually worsening. The pain is at a severity of 0/10. Nothing relieves the symptoms. Exacerbated by: exertion. Associated symptoms include nausea. Pertinent negatives include no fever, no abdominal pain, no diarrhea, no vomiting, no congestion, no ear pain, no headaches, no rhinorrhea, no sore throat, no neck pain, no cough, no shortness of breath, no URI and no rash. Services received include  medications given. Recently, medical care has been given by a specialist.     Past Medical History   Diagnosis Date    Cancer      colon    Hypertension     Petit mal epilepsy 1954    Scoliosis of lumbar spine     Seizures     Unspecified hypothyroidism      Past Medical History Pertinent Negatives   Diagnosis Date Noted    CHF (congestive heart failure) 9/21/2016    COPD (chronic obstructive pulmonary disease) 9/21/2016    Coronary artery disease 9/21/2016    Diabetes mellitus 9/21/2016    Encounter for blood transfusion 9/21/2016    Stroke 9/21/2016     Past Surgical History   Procedure Laterality Date    Cholecystectomy       open    Appendectomy      Tonsillectomy      Vaginal hysterectomy w/ anterior and posterior vaginal repair      Cataract extraction, bilateral Bilateral     Colon surgery      Hysterectomy      Eye surgery Bilateral      cataract removal with lens implant    Portacath placement Right 09/2016    Back surgery  1988     vertebral fracture    Back surgery  02/2013     lumbar L2-5     Family History   Problem Relation Age of Onset    Pancreatic cancer Mother     Hypertension Father     Heart attack Father      Social History   Substance Use Topics    Smoking status: Never Smoker    Smokeless tobacco: None    Alcohol use No     Review of Systems   Constitutional: Positive for activity change and appetite change. Negative for chills, fatigue and fever.   HENT: Negative for congestion, ear pain, rhinorrhea and sore throat.    Respiratory: Negative for cough and shortness of breath.    Cardiovascular: Negative for chest pain.   Gastrointestinal: Positive for nausea. Negative for abdominal pain, diarrhea and vomiting.   Genitourinary: Negative for dysuria.   Musculoskeletal: Negative for back pain and neck pain.   Skin: Negative for rash.   Allergic/Immunologic: Positive for immunocompromised state (Chemo).   Neurological: Positive for weakness (generalized). Negative for  headaches.   Psychiatric/Behavioral: Negative for confusion.   All other systems reviewed and are negative.      Physical Exam   Initial Vitals   BP Pulse Resp Temp SpO2   01/26/17 0930 01/26/17 0930 01/26/17 0930 01/26/17 0930 01/26/17 0930   118/55 64 18 98 °F (36.7 °C) 96 %     Physical Exam    Nursing note and vitals reviewed.  Constitutional: She appears well-developed and well-nourished. She is active and cooperative.  Non-toxic appearance. She has a sickly appearance. She appears ill. She appears distressed (mild).   HENT:   Head: Normocephalic and atraumatic.   Nose: Nose normal.   Mouth/Throat: Uvula is midline. Mucous membranes are pale, dry and not cyanotic. No posterior oropharyngeal erythema.   Eyes: Conjunctivae and EOM are normal.   Neck: Normal range of motion. Neck supple.   Cardiovascular: Normal rate, regular rhythm and normal heart sounds.   Pulmonary/Chest: Effort normal and breath sounds normal.   Abdominal: Soft. Normal appearance and bowel sounds are normal. There is no tenderness.   Neurological: She is alert and oriented to person, place, and time. She has normal strength. GCS eye subscore is 4. GCS verbal subscore is 5. GCS motor subscore is 6.   Skin: Skin is warm, dry and intact. No rash noted. There is pallor.   Psychiatric: She has a normal mood and affect. Her speech is normal and behavior is normal. Judgment and thought content normal. Cognition and memory are normal.         ED Course   Critical Care  Date/Time: 1/26/2017 12:12 PM  Performed by: RAHUL TAN  Authorized by: RAHUL TAN   Direct patient critical care time: 10 minutes  Additional history critical care time: 5 minutes  Ordering / reviewing critical care time: 5 minutes  Documentation critical care time: 5 minutes  Consulting other physicians critical care time: 5 minutes  Total critical care time (exclusive of procedural time) : 30 minutes        Labs Reviewed   CBC W/ AUTO DIFFERENTIAL   COMPREHENSIVE  METABOLIC PANEL   TROPONIN I   URINALYSIS   B-TYPE NATRIURETIC PEPTIDE     EKG Readings: (Independently Interpreted)   Initial Reading: No STEMI. Rhythm: Normal Sinus Rhythm. Heart Rate: 62. Ectopy: No Ectopy. Conduction: Normal. ST Segments: Normal ST Segments.     Imaging Results         X-Ray Chest 1 View (In process)           X-Rays:   Independently Interpreted Readings:   Chest X-Ray: Normal heart size.  No infiltrates.  No acute abnormalities.     Medical Decision Making:   History:   I obtained history from: someone other than patient.       <> Summary of History: Son and daughter  Initial Assessment:   80yo female on Chemo here for progressively worse generalized weakness for five days.  Pt reports decreased PO intake. Denies fever/chills, n/v/d, dysuria, abd pain, vomiting, diarrhea.  Pt appears ill.  Hypotensive.  MM dry. Skin pale, +skin tenting.  HR RRR, lungs CTA and equal. Abd soft, non-tender, bowel sounds normoactive.   Differential Diagnosis:   Dehydration, infection, CHF, STEMI/NStemi, electrolyte abnormality,   Clinical Tests:   Lab Tests: Ordered and Reviewed  Radiological Study: Ordered and Reviewed  Medical Tests: Ordered and Reviewed  ED Management:  Labs, iv fluids, EKG, CXR, UA  Other:   I have discussed this case with another health care provider.       <> Summary of the Discussion: 12:02 PM Dr Cooper, who says admit for observation no antibiotics neutropenia precautions and transfuse 2 units  12:11 PM spoke to Np for [facility] hopitalist    Additional MDM:   Comments: 81-year-old female with history of lymphoma and recent chemotherapy presenting with fatigue likely secondary to dehydration and symptomatic anemia.  Patient has decreased by mouth intake secondary to nausea although she has not taking her Zofran.  She is making urine.  Last bowel movement yesterday.  No coughing no fever no chills.  Also her hemoglobin is a little lower than her baseline over the past few months.  Hemocculted  pending I will transfuse her as recommended by Dr. Cooper and call the hospitalist to admit her..                 ED Course     Clinical Impression:   The primary encounter diagnosis was Hyponatremia. Diagnoses of Weakness, Symptomatic anemia, and Dehydration were also pertinent to this visit.          Shoshana Soares MD  01/26/17 1213

## 2017-01-26 NOTE — PROGRESS NOTES
"Subjective:    Patient ID:  Annette Garcia is a 81 y.o. female who presents for follow-up of Results; Hypertension; Fatigue; Decreased Appetite; Nausea; and Gait Problem      HPI  80 y/o female with hx of difficult to control HTN currently on 3 anti-hypertensives including a diuretic, anemia, acquired hypothyroidism, diffuse large B-cell lymphoma on chemo (last PET showed remission) with last chemo session recently. She presents for f/u HTN.   Since last clinic visit, she was started on chlorthalidone and renal artery doppler obtained. BP log revealed 's-170's. Renal artery doppler with results suggestive of left main 60-99% stenosis, right renal 60-90%, and comparable kidney size (I think there is a typo with the right renal artery measuring 1.1 cm).   Since her last dose of chemo she has felt weak which is normal post chemo for her. However, over the past few days and especially yesterday, she has felt very weak and lightheaded. She becomes dizzy and lightheaded with change in positions. She states that she was "too weak to see the doctor." She appears pale and BP low in clinic today. SBP last clinic visit 180's.  She denies CP, syncope, orthopnea, PND, palps. No hx of JESSENIA and does not smoke. BP diary available and SBP ranging from 150's-190's with average in the high 160's and HR 70-80's until the last few days it has been in the 110's- 130's.     Review of Systems   Constitution: Positive for weakness and malaise/fatigue.   HENT: Negative for congestion and headaches.    Eyes: Negative for blurred vision.   Cardiovascular: Negative for chest pain, claudication, cyanosis, dyspnea on exertion, irregular heartbeat, leg swelling, near-syncope, orthopnea, palpitations, paroxysmal nocturnal dyspnea and syncope.   Respiratory: Negative for shortness of breath.    Endocrine: Negative for polyuria.   Hematologic/Lymphatic: Negative for bleeding problem.   Skin: Negative for itching and rash.   Musculoskeletal: " Negative for joint swelling, muscle cramps and muscle weakness.   Gastrointestinal: Negative for abdominal pain, hematemesis, hematochezia, melena, nausea and vomiting.   Genitourinary: Negative for dysuria and hematuria.   Neurological: Positive for dizziness and light-headedness. Negative for focal weakness and loss of balance.   Psychiatric/Behavioral: Negative for depression. The patient is not nervous/anxious.         Objective:    Physical Exam   Constitutional: She is oriented to person, place, and time. She appears lethargic. She appears ill.   HENT:   Head: Normocephalic and atraumatic.   Neck: Neck supple. No JVD present.   Cardiovascular: Normal rate, regular rhythm and normal heart sounds.    Pulses:       Carotid pulses are 2+ on the right side, and 2+ on the left side.       Radial pulses are 2+ on the right side, and 2+ on the left side.        Femoral pulses are 2+ on the right side, and 2+ on the left side.       Dorsalis pedis pulses are 2+ on the right side, and 2+ on the left side.        Posterior tibial pulses are 2+ on the right side, and 2+ on the left side.   Pulmonary/Chest: Effort normal and breath sounds normal.   Abdominal: Soft. Bowel sounds are normal.   Musculoskeletal: She exhibits no edema.   Neurological: She is oriented to person, place, and time. She appears lethargic.   Skin: Skin is warm and dry.   Psychiatric: She has a normal mood and affect. Her behavior is normal. Thought content normal.         Assessment:       1. Essential hypertension    2. Bilateral renal artery stenosis    3. Anemia due to antineoplastic chemotherapy    4. DLBCL (diffuse large B cell lymphoma)    5. Volume depletion, gastrointestinal loss      80 y/o female with hx and presentation as above. Appears very weak and states that she has progressively been weak since last chemo dose, although yesterday is when she really felt bad. BP 70/30's with repeat SBP 80's. She is likely dehydrated and needs  evaluation in ED. In current immunocompromised state, would not recommend going home and will refer her to ED. We discussed the results of her renal artery doppler and possible etiology of resistant HTN. She is on 3 anti-hypertensives with one being a diuretic and BP still not controlled on good doses. Once she is stable, will discuss renal artery angiogram +/- intervention, but for now she has more immediate issues.        Plan:       -Refer to ED for evaluation of weakness and dehydration in immuno compromised state  -F/u in 1 month

## 2017-01-26 NOTE — ASSESSMENT & PLAN NOTE
Hgb 7.8, Hct 22.3. Patient is symptomatic with fatigue, lightheadedness and hypotension. Transfuse 2 units RBCs and recheck labs.

## 2017-01-26 NOTE — ASSESSMENT & PLAN NOTE
Acute on Chronic Hyponatremia, Dehydration  Sodium 125 today. On 1/16/17 Sodium was 130. Normal Saline IV infusion. Check labs in the morning.

## 2017-01-26 NOTE — IP AVS SNAPSHOT
[facility]  [address]  [place] [address]  Phone: [phone]           Patient Discharge Instructions     Our goal is to set you up for success. This packet includes information on your condition, medications, and your home care. It will help you to care for yourself so you don't get sicker and need to go back to the hospital.     Please ask your nurse if you have any questions.        There are many details to remember when preparing to leave the hospital. Here is what you will need to do:    1. Take your medicine. If you are prescribed medications, review your Medication List in the following pages. You may have new medications to  at the pharmacy and others that you'll need to stop taking. Review the instructions for how and when to take your medications. Talk with your doctor or nurses if you are unsure of what to do.     2. Go to your follow-up appointments. Specific follow-up information is listed in the following pages. Your may be contacted by a transition nurse or clinical provider about future appointments. Be sure we have all of the phone numbers to reach you, if needed. Please contact your provider's office if you are unable to make an appointment.     3. Watch for warning signs. Your doctor or nurse will give you detailed warning signs to watch for and when to call for assistance. These instructions may also include educational information about your condition. If you experience any of warning signs to your health, call your doctor.               [facility]  Unless otherwise directed by your provider, please contact [facility], our nurse care line that is available for 24/7 assistance.     [phone] (toll-free)    Registered nurses in the [facility] provide clinical advisement, health education, appointment booking, and other advisory services.                    ** Verify the list of medication(s) below is accurate and up to date. Carry this  with you in case of emergency. If your medications have changed, please notify your healthcare provider.             Medication List      CONTINUE taking these medications        Additional Info                      acetaminophen 325 MG tablet   Commonly known as:  TYLENOL   Refills:  0   Dose:  325 mg    Instructions:  Take 325 mg by mouth every 4 (four) hours.     Begin Date    AM    Noon    PM    Bedtime       carbamazepine 200 mg tablet   Commonly known as:  EPITOL   Quantity:  90 tablet   Refills:  2   Dose:  200 mg    Instructions:  Take 1 tablet (200 mg total) by mouth nightly.     Begin Date    AM    Noon    PM    Bedtime       carvedilol 25 MG tablet   Commonly known as:  COREG   Quantity:  60 tablet   Refills:  11   Dose:  25 mg    Instructions:  Take 1 tablet (25 mg total) by mouth 2 (two) times daily with meals.     Begin Date    AM    Noon    PM    Bedtime       docusate sodium 100 MG capsule   Commonly known as:  COLACE   Refills:  0   Dose:  100 mg    Instructions:  Take 100 mg by mouth once daily. PT TAKES ONE TABLET 5 TIMES PER WEEK     Begin Date    AM    Noon    PM    Bedtime       levothyroxine 100 MCG tablet   Commonly known as:  SYNTHROID   Quantity:  90 tablet   Refills:  3   Dose:  100 mcg    Last time this was given:  100 mcg on 1/27/2017  5:38 AM   Instructions:  Take 1 tablet (100 mcg total) by mouth once daily.     Begin Date    AM    Noon    PM    Bedtime       lisinopril 40 MG tablet   Commonly known as:  PRINIVIL,ZESTRIL   Quantity:  90 tablet   Refills:  1   Dose:  40 mg    Instructions:  Take 1 tablet (40 mg total) by mouth once daily.     Begin Date    AM    Noon    PM    Bedtime       loratadine 10 mg tablet   Commonly known as:  CLARITIN   Refills:  0   Dose:  10 mg    Instructions:  Take 10 mg by mouth once daily. TAKES WITH CHEMO     Begin Date    AM    Noon    PM    Bedtime       ondansetron 4 MG Tbdl   Commonly known as:  ZOFRAN-ODT   Quantity:  30 tablet   Refills:  1   Dose:   4 mg    Instructions:  Take 1 tablet (4 mg total) by mouth every 6 (six) hours as needed.     Begin Date    AM    Noon    PM    Bedtime       phenobarbital 64.8 MG tablet   Commonly known as:  LUMINAL   Quantity:  90 tablet   Refills:  3   Dose:  64.8 mg    Last time this was given:  64.8 mg on 1/26/2017  9:39 PM   Instructions:  Take 1 tablet (64.8 mg total) by mouth every evening.     Begin Date    AM    Noon    PM    Bedtime       polyethylene glycol 17 gram Pwpk   Commonly known as:  GLYCOLAX   Refills:  0    Instructions:  Take by mouth every other day. TAKES ONCE DAILY DURING THE WEEK OF CHEMO     Begin Date    AM    Noon    PM    Bedtime       predniSONE 50 MG Tab   Commonly known as:  DELTASONE   Quantity:  36 tablet   Refills:  0   Dose:  100 mg    Instructions:  Take 2 tablets (100 mg total) by mouth as directed. Take 2 tabs daily for 3 days with each chemotherapy     Begin Date    AM    Noon    PM    Bedtime       ROBITUSSIN A-C ORAL   Refills:  0    Instructions:  Take by mouth every 12 (twelve) hours.     Begin Date    AM    Noon    PM    Bedtime         STOP taking these medications     chlorthalidone 25 MG Tab   Commonly known as:  HYGROTEN                  Please bring to all follow up appointments:    1. A copy of your discharge instructions.  2. All medicines you are currently taking in their original bottles.  3. Identification and insurance card.    Please arrive 15 minutes ahead of scheduled appointment time.    Please call 24 hours in advance if you must reschedule your appointment and/or time.        Your Scheduled Appointments     Feb 02, 2017  9:40 AM CST   Established Patient Visit with Jose Valles MD   [facility] ([facility])    [address] [place] [address] [phone]            Feb 03, 2017  9:00 AM CST   Established Patient Visit with MD [place] - Hematology Oncology ([address]  [place] [address]    [phone]            Feb 23, 2017 10:20 AM CST   Established Patient Visit with MD Chuy Hall - Cardiology ([facility])    [address], [address]  [place] [address]   [phone]            Mar 03, 2017  8:00 AM CST   Non-Fasting Lab with [facility] [place] [facility] ([facility])    [address]  [place] [address]   [phone]            Mar 07, 2017  8:30 AM CST   Established Patient Visit with MD [place] - Hematology Oncology ([place])    [address]  [place] [id number] [phone]              Follow-up Information     Follow up with Tyson Arredondo MD On 2/3/2017.    Specialties:  Hematology and Oncology, Hematology    Contact information:    [address]  [place] [address]  [phone]          Follow up with Jose Valles MD.    Specialty:  Family Medicine    Contact information:    [address]  [place] [address]  [phone]          Follow up with Timmy Simmons MD On 2/23/2017.    Specialties:  INTERVENTIONAL CARDIOLOGY, Cardiology    Why:  10:20   previously booked       Contact information:    [address]  [address]  [place] [address]  [phone]          Follow up with Jose Valles MD On 2/2/2017.    Specialty:  Family Medicine    Why:  9:40 am         Contact information:    [address]  [place] [address]  [phone]          Discharge Instructions     Future Orders    Activity as tolerated     Comments:    Avoid sick people and wash hands frequently    Call MD for:  difficulty breathing or increased cough     Call MD for:  increased confusion or weakness     Call MD for:  persistent nausea and vomiting or diarrhea     Call MD for:  temperature >100.4     Diet general     Questions:    Total calories:      Fat restriction, if any:      Protein restriction, if any:      Na restriction, if any:      Fluid restriction:      Additional restrictions:  Neutropenic        Discharge  "Instructions       Stop taking Chlorthalidone (Hygroten)    Discharge References/Attachments     NEUTROPENIA (ENGLISH)    ANEMIA, UNDERSTANDING DURING CHEMOTHERAPY (ENGLISH)    THROMBOCYTOPENIA (ENGLISH)    BLOOD TRANSFUSION (ADULT), WHEN YOU NEED A (ENGLISH)    BLOOD AND BLOOD PRODUCT TRANSFUSIONS FOR CANCER (ENGLISH)        Primary Diagnosis     Your primary diagnosis was:  Low Sodium Levels      Admission Information     Date & Time Provider Department CSN    1/26/2017  9:33 AM José Manuel Guidry MD [facility] [id number]      Care Providers     Provider Role Specialty Primary office phone    José Manuel Guidry MD Attending Provider Hospitalist [phone]    José Manuel Guidry MD Physician  Hospitalist  [phone]      Your Vitals Were     BP Pulse Temp Resp Height Weight    149/72 69 98.2 °F (36.8 °C) (Oral) 18 4' 9" (1.448 m) 61.7 kg (136 lb)    SpO2 BMI             98% 29.43 kg/m2         Recent Lab Values     No lab values to display.      Pending Labs     Order Current Status    Prepare RBC 2 Units In process    Blood culture Preliminary result    Blood culture Preliminary result    Prepare RBC Preliminary result      Allergies as of 1/27/2017        Reactions    Adhesive Itching, Blisters    Penicillins Anaphylaxis    Tramadol     May cause seizures    Avelox [Moxifloxacin] Rash    Facial and arm itching and redness. Pt states throat closes when given.    Amoxil [Amoxicillin]     Aspridrox [Aspirin, Buffered]     Codeine     Keflex [Cephalexin]     Norvasc [Amlodipine]     Red Dye Hives    Sulfa (Sulfonamide Antibiotics)     Tylenol [Acetaminophen]     Has reaction to Tylenol with red dye and unable to take Extra Strength Tylenol/ CAN ONLY TOLERATE REG STRENGTH TYLENOL      Advance Directives     An advance directive is a document which, in the event you are no longer able to make decisions for yourself, tells your healthcare team what kind of treatment you do or do not want to receive, or who " you would like to make those decisions for you.  If you do not currently have an advance directive, [facility] encourages you to create one.  For more information call:  [phone]), [phone] ([phone]),  or log on to [email or web address].        Language Assistance Services     ATTENTION: Language assistance services are available, free of charge. Please call [phone].      ATENCIÓN: Si habla español, tiene a cox disposición servicios gratuitos de asistencia lingüística. Llame al [phone].     [facility] Ý: N?u b?n nói Ti?ng Vi?t, có các d?ch v? h? tr? ngôn ng? mi?n phí dành cho b?n. G?i s? [phone].        Blood Transfusion Reaction Signs and Symptoms     The blood you have received has been matched for you as carefully as possible. Most patients who receive a blood transfusion do not experience problems. However, there can be a delayed reaction that happens a few weeks after your blood transfusion. Contact your physician immediately if you experience any NEW SYMPTOMS listed below:     Fever greater than 100.4 degrees    Chills   Yellow color to your skin or eyes(Jaundice)   Back pain, chest pain, or pain at the infusion site   Weakness (more than usual)   Discomfort or uneasiness more than usual (Malaise)   Nausea or vomiting   Shortness of breath, wheezing, or coughing   Higher or lower blood pressure than normal   Skin rash, itching, skin redness, or localized swelling (example: hands or feet)   Urinating less than normal   Urine appears reddish or orange and is darker than normal      Remember that some these signs may already exist for you--such as having chronic back pain or high blood pressure. You only need to look for and report to your doctor any new occurrences since your blood transfusion that are of concern.        Pneumonmia Discharge Instructions                 [facility] complies with applicable Federal civil rights laws and does  not discriminate on the basis of race, color, national origin, age, disability, or sex.

## 2017-01-26 NOTE — MR AVS SNAPSHOT
[facility] Card[facility]  [facility] [address]  [place] [address]  Phone: [phone]  Fax: [phone]                  Annette Garcia   2017 8:00 AM   Office Visit    Description:  Female : 1935   Provider:  Timmy Simmons MD   Department:  [facility] - Cardiology           Reason for Visit     Results     Hypertension     Fatigue     Decreased Appetite     Nausea     Gait Problem                To Do List           Future Appointments        Provider Department Dept Phone    3/3/2017 8:00 AM [facility] [place] [facility] [phone]    3/7/2017 8:30 AM Tyson Arredondo MD [place] - Hematology Oncology [phone]      Goals (5 Years of Data)     None      [facility]     [facility] On Call Nurse Care Line -  Assistance  Registered nurses in the [facility] On Call Center provide clinical advisement, health education, appointment booking, and other advisory services.  Call for this free service at [phone].             Medications                Verify that the below list of medications is an accurate representation of the medications you are currently taking.  If none reported, the list may be blank. If incorrect, please contact your healthcare provider. Carry this list with you in case of emergency.           Current Medications     acetaminophen (TYLENOL) 325 MG tablet Take 325 mg by mouth every 4 (four) hours.    carbamazepine (EPITOL) 200 mg tablet Take 1 tablet (200 mg total) by mouth nightly.    carvedilol (COREG) 25 MG tablet Take 1 tablet (25 mg total) by mouth 2 (two) times daily with meals.    chlorthalidone (HYGROTEN) 25 MG Tab Take 1 tablet (25 mg total) by mouth once daily.    docusate sodium (COLACE) 100 MG capsule Take 100 mg by mouth once daily. PT TAKES ONE TABLET 5 TIMES PER WEEK    GUAIFENESIN/CODEINE PHOSPHATE (ROBITUSSIN A-C ORAL) Take by mouth every 12 (twelve) hours.    levothyroxine (SYNTHROID) 100 MCG tablet Take 1 tablet (100 mcg  "total) by mouth once daily.    lisinopril (PRINIVIL,ZESTRIL) 40 MG tablet Take 1 tablet (40 mg total) by mouth once daily.    loratadine (CLARITIN) 10 mg tablet Take 10 mg by mouth once daily. TAKES WITH CHEMO    ondansetron (ZOFRAN-ODT) 4 MG TbDL Take 1 tablet (4 mg total) by mouth every 6 (six) hours as needed.    phenobarbital (LUMINAL) 64.8 MG tablet Take 1 tablet (64.8 mg total) by mouth every evening.    polyethylene glycol (GLYCOLAX) 17 gram PwPk Take by mouth every other day. TAKES ONCE DAILY DURING THE WEEK OF CHEMO    predniSONE (DELTASONE) 50 MG Tab Take 2 tablets (100 mg total) by mouth as directed. Take 2 tabs daily for 3 days with each chemotherapy           Clinical Reference Information           Vital Signs - Last Recorded  Most recent update: 1/26/2017  8:27 AM by Briana Lyons LPN    BP Pulse Ht Wt SpO2 BMI    (!) 81/45 70 4' 9.5" (1.461 m) 61.7 kg (136 lb) 99% 28.92 kg/m2      Blood Pressure          Most Recent Value    Right Arm BP - Sitting  81/45    Left Arm BP - Sitting  74/43    BP  (!)  81/45      Allergies as of 1/26/2017     Adhesive    Penicillins    Tramadol    Avelox [Moxifloxacin]    Amoxil [Amoxicillin]    Aspridrox [Aspirin, Buffered]    Codeine    Keflex [Cephalexin]    Norvasc [Amlodipine]    Red Dye    Sulfa (Sulfonamide Antibiotics)    Tylenol [Acetaminophen]      Immunizations Administered on Date of Encounter - 1/26/2017     None      [facility] Sign-Up     Activating your [facility] account is as easy as 1-2-3!     1) Visit [email or web address], select Sign Up Now, enter this activation code and your date of birth, then select Next.  [email or web address]  Expires: 3/12/2017  8:44 AM      2) Create a username and password to use when you visit [facility] in the future and select a security question in case you lose your password and select Next.    3) Enter your e-mail address and click Sign Up!    Additional Information  If you have questions, please e-mail [email or web address] or " call [phone] to talk to our [facility] staff. Remember, [facility] is NOT to be used for urgent needs. For medical emergencies, dial 911.

## 2017-01-26 NOTE — ED NOTES
Pt is unsure if her port can be accessed for IV meds and blood now that her chemotherapy finished last week. Message left with Dr. Arredondo's office. Awaiting call back

## 2017-01-26 NOTE — ED NOTES
Pt c/o generalized weakness, fatigue, and decreased appetite for the past few days. States symptoms started on Friday but have worsened. Denies any pain, shortness of breath, or fever. Pt sees Dr. Arredondo, and just finished her final chemotherapy infusion last Tuesday. States she normally gets mild flu-like symptoms a few days after chemo, but states she feels worse than normal in the past couple of days. Generalized weakness noted, but pt is able to move all extremities. Respirations are even, unlabored. Skin is warm, dry. No neurologic deficits noted. Pt denies abdominal pain, but reports intermittent nausea.

## 2017-01-26 NOTE — ASSESSMENT & PLAN NOTE
Petit mal epilepsy since age 19 (last seizure age 26), On Carbomazepine 200 mg nightly and phenobarbital 64.8 mg every other night. Resume. Check phenobarbital level.

## 2017-01-26 NOTE — SUBJECTIVE & OBJECTIVE
Past Medical History   Diagnosis Date    Cancer      colon    Hypertension     Petit mal epilepsy 1954    Scoliosis of lumbar spine     Seizures     Unspecified hypothyroidism        Past Surgical History   Procedure Laterality Date    Cholecystectomy       open    Appendectomy      Tonsillectomy      Vaginal hysterectomy w/ anterior and posterior vaginal repair      Cataract extraction, bilateral Bilateral     Colon surgery      Hysterectomy      Eye surgery Bilateral      cataract removal with lens implant    Portacath placement Right 09/2016    Back surgery  1988     vertebral fracture    Back surgery  02/2013     lumbar L2-5       Review of patient's allergies indicates:   Allergen Reactions    Adhesive Itching and Blisters    Penicillins Anaphylaxis    Tramadol      May cause seizures    Avelox [moxifloxacin] Rash     Facial and arm itching and redness. Pt states throat closes when given.    Amoxil [amoxicillin]     Aspridrox [aspirin, buffered]     Codeine     Keflex [cephalexin]     Norvasc [amlodipine]     Red dye Hives    Sulfa (sulfonamide antibiotics)     Tylenol [acetaminophen]      Has reaction to Tylenol with red dye and unable to take Extra Strength Tylenol/ CAN ONLY TOLERATE REG STRENGTH TYLENOL       No current facility-administered medications on file prior to encounter.      Current Outpatient Prescriptions on File Prior to Encounter   Medication Sig    acetaminophen (TYLENOL) 325 MG tablet Take 325 mg by mouth every 4 (four) hours.    carbamazepine (EPITOL) 200 mg tablet Take 1 tablet (200 mg total) by mouth nightly.    carvedilol (COREG) 25 MG tablet Take 1 tablet (25 mg total) by mouth 2 (two) times daily with meals.    chlorthalidone (HYGROTEN) 25 MG Tab Take 1 tablet (25 mg total) by mouth once daily.    docusate sodium (COLACE) 100 MG capsule Take 100 mg by mouth once daily. PT TAKES ONE TABLET 5 TIMES PER WEEK    GUAIFENESIN/CODEINE PHOSPHATE (ROBITUSSIN  A-C ORAL) Take by mouth every 12 (twelve) hours.    levothyroxine (SYNTHROID) 100 MCG tablet Take 1 tablet (100 mcg total) by mouth once daily.    lisinopril (PRINIVIL,ZESTRIL) 40 MG tablet Take 1 tablet (40 mg total) by mouth once daily.    loratadine (CLARITIN) 10 mg tablet Take 10 mg by mouth once daily. TAKES WITH CHEMO    ondansetron (ZOFRAN-ODT) 4 MG TbDL Take 1 tablet (4 mg total) by mouth every 6 (six) hours as needed.    phenobarbital (LUMINAL) 64.8 MG tablet Take 1 tablet (64.8 mg total) by mouth every evening.    polyethylene glycol (GLYCOLAX) 17 gram PwPk Take by mouth every other day. TAKES ONCE DAILY DURING THE WEEK OF CHEMO    predniSONE (DELTASONE) 50 MG Tab Take 2 tablets (100 mg total) by mouth as directed. Take 2 tabs daily for 3 days with each chemotherapy     Family History     Problem Relation (Age of Onset)    Heart attack Father    Hypertension Father    Pancreatic cancer Mother        Social History Main Topics    Smoking status: Never Smoker    Smokeless tobacco: Not on file    Alcohol use No    Drug use: No    Sexual activity: Not on file     Review of Systems   Constitutional: Negative for chills, diaphoresis and fever.   HENT: Negative for congestion, sore throat and trouble swallowing.    Eyes: Negative for photophobia and visual disturbance.   Respiratory: Negative for cough, shortness of breath and wheezing.    Cardiovascular: Negative for chest pain and palpitations.   Gastrointestinal: Positive for nausea. Negative for abdominal pain, constipation, diarrhea and vomiting.   Endocrine: Negative for polydipsia and polyphagia.   Genitourinary: Negative for decreased urine volume, dysuria, hematuria and urgency.   Musculoskeletal: Negative for joint swelling, neck pain and neck stiffness.   Skin: Negative for pallor.   Neurological: Positive for weakness. Negative for numbness and headaches.   Psychiatric/Behavioral: Negative for agitation, dysphoric mood and suicidal ideas.      Objective:     Vital Signs (Most Recent):  Temp: 98 °F (36.7 °C) (01/26/17 0930)  Pulse: 70 (01/26/17 1620)  Resp: 17 (01/26/17 1620)  BP: (!) 150/44 (01/26/17 1620)  SpO2: 98 % (01/26/17 1620) Vital Signs (24h Range):  Temp:  [98 °F (36.7 °C)] 98 °F (36.7 °C)  Pulse:  [61-96] 70  Resp:  [13-18] 17  SpO2:  [96 %-100 %] 98 %  BP: ()/(44-87) 150/44     Weight: 61.7 kg (136 lb)  Body mass index is 29.43 kg/(m^2).    Physical Exam   Constitutional: She is oriented to person, place, and time. She appears well-developed and well-nourished. No distress.   Accessed Portacath right chest wall   HENT:   Head: Normocephalic and atraumatic.   Mouth/Throat: No oropharyngeal exudate.   Oropharynx dry   Eyes: Conjunctivae are normal. Pupils are equal, round, and reactive to light. No scleral icterus.   Neck: Neck supple.   Cardiovascular: Normal rate, regular rhythm, normal heart sounds and intact distal pulses.  Exam reveals no gallop and no friction rub.    No murmur heard.  Pulmonary/Chest: Effort normal and breath sounds normal. No respiratory distress. She has no wheezes. She has no rales.   Abdominal: Soft. Bowel sounds are normal. She exhibits no distension. There is no tenderness. There is no rebound and no guarding.   Musculoskeletal: She exhibits no edema, tenderness or deformity.   Neurological: She is alert and oriented to person, place, and time. She exhibits normal muscle tone.   Skin: Skin is warm and dry. No rash noted. She is not diaphoretic. There is pallor.   Psychiatric: She has a normal mood and affect. Her behavior is normal.   Nursing note and vitals reviewed.       Significant Labs:   CBC:   Recent Labs  Lab 01/26/17  1024   WBC 0.29*   HGB 7.8*   HCT 22.3*   PLT 44*     CMP:   Recent Labs  Lab 01/26/17  1024   *   K 4.1   CL 88*   CO2 26   *   BUN 25*   CREATININE 1.0   CALCIUM 9.2   PROT 6.4   ALBUMIN 3.9   BILITOT 0.6   ALKPHOS 84   AST 15   ALT 16   ANIONGAP 11   EGFRNONAA 54*      Lactic Acid:   Recent Labs  Lab 01/26/17  1231   LACTATE 2.3*     Troponin:   Recent Labs  Lab 01/26/17  1024   TROPONINI 0.042*   BNP                    60    TSH  1.969    Influenza A and B negative    Stool Negative for Occult Blood    Urine Studies:   Recent Labs  Lab 01/26/17  1340   COLORU Yellow   APPEARANCEUA Clear   PHUR 6.0   SPECGRAV 1.020   PROTEINUA Negative   GLUCUA Negative   KETONESU Negative   BILIRUBINUA Negative   OCCULTUA Negative   NITRITE Negative   UROBILINOGEN Negative   LEUKOCYTESUR Negative       Significant Imaging:   Imaging Results         X-Ray Chest 1 View (Final result) Result time:  01/26/17 10:14:20    Final result by Dani Lincoln MD (01/26/17 10:14:20)    Impression:     No acute process seen.      Electronically signed by: DANI LINCOLN MD  Date:     01/26/17  Time:    10:14     Narrative:    AP chest    Comparison: 12/15/16    Results: There is a port distal tip in the SVC.  The cardiac silhouette is midline.  The lungs are clear.  No pneumothorax.  The osseous structures appear within normal limits for age.

## 2017-01-26 NOTE — H&P
[facility]  History & Physical    Patient Name: Annette Garcia  MRN: [id number]  Admission Date: 1/26/2017  Attending Physician: Shoshana Soares MD   Primary Care Provider: Jose Valles MD         Patient information was obtained from patient, relative(s), EMS personnel, past medical records and ER records.     Subjective:     Principal Problem:Dehydration with hyponatremia    Chief Complaint:  Hypotension, Fatigue     HPI: Annette Garcia is a 80 yo  woman with petit mal epilepsy since age 19 (last seizure age 26), post-traumatic lumbar spondylosis, sacroiliac degenerative joint disease, difficult to control hypertension (-190), hypothyroidism, diffuse large B cell lymphoma (diagnosed 8/23/16, last chemo 1/17/17). She lives in [place], [place] with her , who has lung cancer. She has 7 children. She uses a straight cane to ambulate. Her primary care physician is Dr. Jose Torres. Her oncologist is Dr. Tyson Arredondo. Her cardiologist is Dr. Timmy Simmons.     She was sent to [facility] ED from Dr. Simmons's clinic on 1/26/17 for progressively worse generalized weakness for five days.  Her last chemo was 1/17.  Pt denies fever, chills, sore throat, nasal congestion, CP, SOB, palpitations, abd pain, vomiting, diarrhea, dysuria, and rash.  Pt reports nausea but has not used any antiemetics. Pt's daughter reports that she has not been eating or drinking much since her last treatment.  Pt reports a history of dehydration.      Her initial BP was 81/45, but was 140/76 after 1 liter of IV normal saline.  Signifigant labs revealed WBC 0.29, Hg 7.8, Hct 22.3, Sodium 125. 1 week ago her labs were WBC 4.51, Hg 8.4, Hct 24.7m and Sodium 125.  Her Troponin is 0.042, Lactate 2.3. S    She is being admitted to [facility] for hyponatremia, hypotensive and  symptomatic anemia.  She will receive 2 units of RBC, Normal Saline infusion and serial  troponin.  She her troponin and lactate will be trended. She is on phenobarbital for a history Trauma induced seizures, Level will be checked. She is placed on neutropenic precautions.        Past Medical History   Diagnosis Date    Cancer      colon    Hypertension     Petit mal epilepsy 1954    Scoliosis of lumbar spine     Seizures     Unspecified hypothyroidism        Past Surgical History   Procedure Laterality Date    Cholecystectomy       open    Appendectomy      Tonsillectomy      Vaginal hysterectomy w/ anterior and posterior vaginal repair      Cataract extraction, bilateral Bilateral     Colon surgery      Hysterectomy      Eye surgery Bilateral      cataract removal with lens implant    Portacath placement Right 09/2016    Back surgery  1988     vertebral fracture    Back surgery  02/2013     lumbar L2-5       Review of patient's allergies indicates:   Allergen Reactions    Adhesive Itching and Blisters    Penicillins Anaphylaxis    Tramadol      May cause seizures    Avelox [moxifloxacin] Rash     Facial and arm itching and redness. Pt states throat closes when given.    Amoxil [amoxicillin]     Aspridrox [aspirin, buffered]     Codeine     Keflex [cephalexin]     Norvasc [amlodipine]     Red dye Hives    Sulfa (sulfonamide antibiotics)     Tylenol [acetaminophen]      Has reaction to Tylenol with red dye and unable to take Extra Strength Tylenol/ CAN ONLY TOLERATE REG STRENGTH TYLENOL       No current facility-administered medications on file prior to encounter.      Current Outpatient Prescriptions on File Prior to Encounter   Medication Sig    acetaminophen (TYLENOL) 325 MG tablet Take 325 mg by mouth every 4 (four) hours.    carbamazepine (EPITOL) 200 mg tablet Take 1 tablet (200 mg total) by mouth nightly.    carvedilol (COREG) 25 MG tablet Take 1 tablet (25 mg total) by mouth 2 (two) times daily with meals.    chlorthalidone (HYGROTEN) 25 MG Tab Take 1 tablet (25  mg total) by mouth once daily.    docusate sodium (COLACE) 100 MG capsule Take 100 mg by mouth once daily. PT TAKES ONE TABLET 5 TIMES PER WEEK    GUAIFENESIN/CODEINE PHOSPHATE (ROBITUSSIN A-C ORAL) Take by mouth every 12 (twelve) hours.    levothyroxine (SYNTHROID) 100 MCG tablet Take 1 tablet (100 mcg total) by mouth once daily.    lisinopril (PRINIVIL,ZESTRIL) 40 MG tablet Take 1 tablet (40 mg total) by mouth once daily.    loratadine (CLARITIN) 10 mg tablet Take 10 mg by mouth once daily. TAKES WITH CHEMO    ondansetron (ZOFRAN-ODT) 4 MG TbDL Take 1 tablet (4 mg total) by mouth every 6 (six) hours as needed.    phenobarbital (LUMINAL) 64.8 MG tablet Take 1 tablet (64.8 mg total) by mouth every evening.    polyethylene glycol (GLYCOLAX) 17 gram PwPk Take by mouth every other day. TAKES ONCE DAILY DURING THE WEEK OF CHEMO    predniSONE (DELTASONE) 50 MG Tab Take 2 tablets (100 mg total) by mouth as directed. Take 2 tabs daily for 3 days with each chemotherapy     Family History     Problem Relation (Age of Onset)    Heart attack Father    Hypertension Father    Pancreatic cancer Mother        Social History Main Topics    Smoking status: Never Smoker    Smokeless tobacco: Not on file    Alcohol use No    Drug use: No    Sexual activity: Not on file     Review of Systems   Constitutional: Negative for chills, diaphoresis and fever.   HENT: Negative for congestion, sore throat and trouble swallowing.    Eyes: Negative for photophobia and visual disturbance.   Respiratory: Negative for cough, shortness of breath and wheezing.    Cardiovascular: Negative for chest pain and palpitations.   Gastrointestinal: Positive for nausea. Negative for abdominal pain, constipation, diarrhea and vomiting.   Endocrine: Negative for polydipsia and polyphagia.   Genitourinary: Negative for decreased urine volume, dysuria, hematuria and urgency.   Musculoskeletal: Negative for joint swelling, neck pain and neck stiffness.    Skin: Negative for pallor.   Neurological: Positive for weakness. Negative for numbness and headaches.   Psychiatric/Behavioral: Negative for agitation, dysphoric mood and suicidal ideas.     Objective:     Vital Signs (Most Recent):  Temp: 98 °F (36.7 °C) (01/26/17 0930)  Pulse: 70 (01/26/17 1620)  Resp: 17 (01/26/17 1620)  BP: (!) 150/44 (01/26/17 1620)  SpO2: 98 % (01/26/17 1620) Vital Signs (24h Range):  Temp:  [98 °F (36.7 °C)] 98 °F (36.7 °C)  Pulse:  [61-96] 70  Resp:  [13-18] 17  SpO2:  [96 %-100 %] 98 %  BP: ()/(44-87) 150/44     Weight: 61.7 kg (136 lb)  Body mass index is 29.43 kg/(m^2).    Physical Exam   Constitutional: She is oriented to person, place, and time. She appears well-developed and well-nourished. No distress.   Accessed Portacath right chest wall   HENT:   Head: Normocephalic and atraumatic.   Mouth/Throat: No oropharyngeal exudate.   Oropharynx dry   Eyes: Conjunctivae are normal. Pupils are equal, round, and reactive to light. No scleral icterus.   Neck: Neck supple.   Cardiovascular: Normal rate, regular rhythm, normal heart sounds and intact distal pulses.  Exam reveals no gallop and no friction rub.    No murmur heard.  Pulmonary/Chest: Effort normal and breath sounds normal. No respiratory distress. She has no wheezes. She has no rales.   Abdominal: Soft. Bowel sounds are normal. She exhibits no distension. There is no tenderness. There is no rebound and no guarding.   Musculoskeletal: She exhibits no edema, tenderness or deformity.   Neurological: She is alert and oriented to person, place, and time. She exhibits normal muscle tone.   Skin: Skin is warm and dry. No rash noted. She is not diaphoretic. There is pallor.   Psychiatric: She has a normal mood and affect. Her behavior is normal.   Nursing note and vitals reviewed.       Significant Labs:   CBC:   Recent Labs  Lab 01/26/17  1024   WBC 0.29*   HGB 7.8*   HCT 22.3*   PLT 44*     CMP:   Recent Labs  Lab 01/26/17  1024    *   K 4.1   CL 88*   CO2 26   *   BUN 25*   CREATININE 1.0   CALCIUM 9.2   PROT 6.4   ALBUMIN 3.9   BILITOT 0.6   ALKPHOS 84   AST 15   ALT 16   ANIONGAP 11   EGFRNONAA 54*     Lactic Acid:   Recent Labs  Lab 01/26/17  1231   LACTATE 2.3*     Troponin:   Recent Labs  Lab 01/26/17  1024   TROPONINI 0.042*   BNP                    60    TSH  1.969    Influenza A and B negative    Stool Negative for Occult Blood    Urine Studies:   Recent Labs  Lab 01/26/17  1340   COLORU Yellow   APPEARANCEUA Clear   PHUR 6.0   SPECGRAV 1.020   PROTEINUA Negative   GLUCUA Negative   KETONESU Negative   BILIRUBINUA Negative   OCCULTUA Negative   NITRITE Negative   UROBILINOGEN Negative   LEUKOCYTESUR Negative       Significant Imaging:   Imaging Results         X-Ray Chest 1 View (Final result) Result time:  01/26/17 10:14:20    Final result by Dani Lincoln MD (01/26/17 10:14:20)    Impression:     No acute process seen.      Electronically signed by: DANI LINCOLN MD  Date:     01/26/17  Time:    10:14     Narrative:    AP chest    Comparison: 12/15/16    Results: There is a port distal tip in the SVC.  The cardiac silhouette is midline.  The lungs are clear.  No pneumothorax.  The osseous structures appear within normal limits for age.            Assessment/Plan:     Essential hypertension  Difficult to treat hypertension at baseline. On Coreg 25 mg BID, Lisinopril 40 mg daily and chlorthalidone 25 mg BID with meals. Hypotensive on admit. Holding.       Nonintractable absence epilepsy without status epilepticus  Petit mal epilepsy since age 19 (last seizure age 26), On Carbomazepine 200 mg nightly and phenobarbital 64.8 mg every other night. Resume. Check phenobarbital level.      Acquired hypothyroidism  TSH 1.969. Continue levothyroxine 100 mg daily      Osteoarthritis of lumbar spine  DJD of Sacroiliac Joint  Chronic: PRN Pain medications      Chemotherapy induced neutropenia  Last Chemotherapy was  1/17/17. WBC at that time 4.51. Today WBC 0.29. Patient is afebrile, denies sick contact and is displaying no evidence of infection. Neutropenic precautions. Blood Cultures for Temp > 100.1      Chronic hyponatremia  Acute on Chronic Hyponatremia, Dehydration  Sodium 125 today. On 1/16/17 Sodium was 130. Normal Saline IV infusion. Check labs in the morning.      Anemia due to antineoplastic chemotherapy  Hgb 7.8, Hct 22.3. Patient is symptomatic with fatigue, lightheadedness and hypotension. Transfuse 2 units RBCs and recheck labs.       VTE Risk Mitigation     None        Teresa Echols NP  Department of Hospital Medicine   [facility]

## 2017-01-26 NOTE — ASSESSMENT & PLAN NOTE
Last Chemotherapy was 1/17/17. WBC at that time 4.51. Today WBC 0.29. Patient is afebrile, denies sick contact and is displaying no evidence of infection. Neutropenic precautions. Blood Cultures for Temp > 100.1

## 2017-01-27 ENCOUNTER — PATIENT MESSAGE (OUTPATIENT)
Dept: NEUROLOGY | Facility: CLINIC | Age: 82
End: 2017-01-27

## 2017-01-27 ENCOUNTER — PATIENT MESSAGE (OUTPATIENT)
Dept: CARDIOLOGY | Facility: CLINIC | Age: 82
End: 2017-01-27

## 2017-01-27 VITALS
WEIGHT: 136 LBS | BODY MASS INDEX: 29.34 KG/M2 | OXYGEN SATURATION: 98 % | RESPIRATION RATE: 18 BRPM | TEMPERATURE: 98 F | SYSTOLIC BLOOD PRESSURE: 149 MMHG | HEART RATE: 65 BPM | HEIGHT: 57 IN | DIASTOLIC BLOOD PRESSURE: 72 MMHG

## 2017-01-27 PROBLEM — E87.1 HYPONATREMIA: Status: RESOLVED | Noted: 2017-01-26 | Resolved: 2017-01-27

## 2017-01-27 PROBLEM — E86.0 DEHYDRATION WITH HYPONATREMIA: Status: RESOLVED | Noted: 2017-01-26 | Resolved: 2017-01-27

## 2017-01-27 PROBLEM — E87.1 DEHYDRATION WITH HYPONATREMIA: Status: RESOLVED | Noted: 2017-01-26 | Resolved: 2017-01-27

## 2017-01-27 LAB
ALBUMIN SERPL BCP-MCNC: 3 G/DL
ALP SERPL-CCNC: 64 U/L
ALT SERPL W/O P-5'-P-CCNC: 12 U/L
ANION GAP SERPL CALC-SCNC: 7 MMOL/L
ANISOCYTOSIS BLD QL SMEAR: SLIGHT
AST SERPL-CCNC: 13 U/L
BASOPHILS NFR BLD: 0 %
BILIRUB SERPL-MCNC: 1.3 MG/DL
BUN SERPL-MCNC: 15 MG/DL
CALCIUM SERPL-MCNC: 8.3 MG/DL
CHLORIDE SERPL-SCNC: 96 MMOL/L
CO2 SERPL-SCNC: 27 MMOL/L
CREAT SERPL-MCNC: 0.6 MG/DL
DIFFERENTIAL METHOD: ABNORMAL
EOSINOPHIL NFR BLD: 0 %
ERYTHROCYTE [DISTWIDTH] IN BLOOD BY AUTOMATED COUNT: 18 %
EST. GFR  (AFRICAN AMERICAN): >60 ML/MIN/1.73 M^2
EST. GFR  (NON AFRICAN AMERICAN): >60 ML/MIN/1.73 M^2
GLUCOSE SERPL-MCNC: 97 MG/DL
HCT VFR BLD AUTO: 25.2 %
HGB BLD-MCNC: 8.9 G/DL
HYPOCHROMIA BLD QL SMEAR: ABNORMAL
LACTATE SERPL-SCNC: 1.6 MMOL/L
LYMPHOCYTES NFR BLD: 39 %
MAGNESIUM SERPL-MCNC: 1.1 MG/DL
MCH RBC QN AUTO: 33.1 PG
MCHC RBC AUTO-ENTMCNC: 35.3 %
MCV RBC AUTO: 94 FL
MONOCYTES NFR BLD: 10 %
NEUTROPHILS NFR BLD: 24 %
NEUTS BAND NFR BLD MANUAL: 27 %
PHOSPHATE SERPL-MCNC: 2.7 MG/DL
PLATELET # BLD AUTO: 41 K/UL
PLATELET BLD QL SMEAR: ABNORMAL
PMV BLD AUTO: 11.9 FL
POTASSIUM SERPL-SCNC: 3.2 MMOL/L
PROT SERPL-MCNC: 5.2 G/DL
RBC # BLD AUTO: 2.69 M/UL
SODIUM SERPL-SCNC: 130 MMOL/L
WBC # BLD AUTO: 0.5 K/UL

## 2017-01-27 PROCEDURE — 94761 N-INVAS EAR/PLS OXIMETRY MLT: CPT

## 2017-01-27 PROCEDURE — 25000003 PHARM REV CODE 250: Performed by: NURSE PRACTITIONER

## 2017-01-27 PROCEDURE — 63600175 PHARM REV CODE 636 W HCPCS: Performed by: NURSE PRACTITIONER

## 2017-01-27 PROCEDURE — 99233 SBSQ HOSP IP/OBS HIGH 50: CPT | Mod: GC,,, | Performed by: INTERNAL MEDICINE

## 2017-01-27 PROCEDURE — 80053 COMPREHEN METABOLIC PANEL: CPT

## 2017-01-27 PROCEDURE — 85007 BL SMEAR W/DIFF WBC COUNT: CPT

## 2017-01-27 PROCEDURE — 84100 ASSAY OF PHOSPHORUS: CPT

## 2017-01-27 PROCEDURE — 85027 COMPLETE CBC AUTOMATED: CPT

## 2017-01-27 PROCEDURE — 83735 ASSAY OF MAGNESIUM: CPT

## 2017-01-27 RX ORDER — LANOLIN ALCOHOL/MO/W.PET/CERES
400 CREAM (GRAM) TOPICAL ONCE
Status: COMPLETED | OUTPATIENT
Start: 2017-01-27 | End: 2017-01-27

## 2017-01-27 RX ORDER — POTASSIUM CHLORIDE 7.45 MG/ML
10 INJECTION INTRAVENOUS
Status: DISCONTINUED | OUTPATIENT
Start: 2017-01-27 | End: 2017-01-27

## 2017-01-27 RX ORDER — MAGNESIUM SULFATE HEPTAHYDRATE 40 MG/ML
2 INJECTION, SOLUTION INTRAVENOUS ONCE
Status: COMPLETED | OUTPATIENT
Start: 2017-01-27 | End: 2017-01-27

## 2017-01-27 RX ORDER — POTASSIUM CHLORIDE 20 MEQ/1
40 TABLET, EXTENDED RELEASE ORAL ONCE
Status: COMPLETED | OUTPATIENT
Start: 2017-01-27 | End: 2017-01-27

## 2017-01-27 RX ORDER — POTASSIUM CHLORIDE 20 MEQ/1
20 TABLET, EXTENDED RELEASE ORAL DAILY
Status: DISCONTINUED | OUTPATIENT
Start: 2017-01-27 | End: 2017-01-27 | Stop reason: HOSPADM

## 2017-01-27 RX ORDER — HEPARIN 100 UNIT/ML
5 SYRINGE INTRAVENOUS
Status: DISCONTINUED | OUTPATIENT
Start: 2017-01-27 | End: 2017-01-27 | Stop reason: HOSPADM

## 2017-01-27 RX ADMIN — POTASSIUM CHLORIDE 20 MEQ: 20 TABLET, EXTENDED RELEASE ORAL at 10:01

## 2017-01-27 RX ADMIN — MAGNESIUM OXIDE TAB 400 MG (241.3 MG ELEMENTAL MG) 400 MG: 400 (241.3 MG) TAB at 10:01

## 2017-01-27 RX ADMIN — SODIUM CHLORIDE: 0.9 INJECTION, SOLUTION INTRAVENOUS at 05:01

## 2017-01-27 RX ADMIN — POTASSIUM CHLORIDE 40 MEQ: 20 TABLET, EXTENDED RELEASE ORAL at 02:01

## 2017-01-27 RX ADMIN — MAGNESIUM SULFATE IN WATER 2 G: 40 INJECTION, SOLUTION INTRAVENOUS at 10:01

## 2017-01-27 RX ADMIN — LEVOTHYROXINE SODIUM 100 MCG: 50 TABLET ORAL at 05:01

## 2017-01-27 RX ADMIN — HEPARIN SODIUM (PORCINE) LOCK FLUSH IV SOLN 100 UNIT/ML 500 UNITS: 100 SOLUTION at 01:01

## 2017-01-27 NOTE — ASSESSMENT & PLAN NOTE
Difficult to treat hypertension at baseline. On Coreg 25 mg BID, Lisinopril 40 mg daily and chlorthalidone 25 mg BID with meals. Blood Pressure at Baseline. Resume Home Coreg and Lisinopril at discharge.  Stop taking chlorthalidone.

## 2017-01-27 NOTE — ASSESSMENT & PLAN NOTE
Diffuse Large B Cell Lymphoma  Hgb 7.8, Hct 22.3. Transfused 2 units RBCs, Hgb 8.9, Hct 25.2. Follow with Dr. Arredondo.

## 2017-01-27 NOTE — PROGRESS NOTES
[facility] Hospital Medicine  Progress Note    Patient Name: Annette Garcia  MRN: [id number]  Patient Class: IP- Inpatient   Admission Date: 1/26/2017  Length of Stay: 1 days  Attending Physician: José Manuel Guidry MD  Primary Care Provider: Jose Valles MD        Subjective:     Principal Problem:Dehydration with hyponatremia    HPI:  Annette Garcia is a 82 yo  woman with petit mal epilepsy since age 19 (last seizure age 26), post-traumatic lumbar spondylosis, sacroiliac degenerative joint disease, difficult to control hypertension (-190), hypothyroidism, diffuse large B cell lymphoma (diagnosed 8/23/16, last chemo 1/17/17). She lives in [place], [place] with her , who has lung cancer. She has 7 children. She uses a straight cane to ambulate. Her primary care physician is Dr. Jose Torres. Her oncologist is Dr. Tyson Arredondo. Her cardiologist is Dr. Timmy Simmons.     She was sent to [facility] ED from Dr. Simmons's clinic on 1/26/17 for progressively worse generalized weakness for five days.  Her last chemo was 1/17.  Pt denies fever, chills, sore throat, nasal congestion, CP, SOB, palpitations, abd pain, vomiting, diarrhea, dysuria, and rash.  Pt reports nausea but has not used any antiemetics. Pt's daughter reports that she has not been eating or drinking much since her last treatment.  Pt reports a history of dehydration.      Her initial BP was 81/45, but was 140/76 after 1 liter of IV normal saline.  Signifigant labs revealed WBC 0.29, Hg 7.8, Hct 22.3, Sodium 125. 1 week ago her labs were WBC 4.51, Hg 8.4, Hct 24.7m and Sodium 125.  Her Troponin is 0.042, Lactate 2.3. S    She is being admitted to [facility] for hyponatremia, hypotensive and  symptomatic anemia.  She will receive 2 units of RBC, Normal Saline infusion and serial troponin.  She her troponin and lactate will be trended. She is on phenobarbital for a history Trauma induced  seizures, Level will be checked. She is placed on neutropenic precautions.        Hospital Course:  Patient received 2 uRBCs and Hgb up to 8.9.  Patient has been afebrile and blood pressure back to baseline. Discharge today.     Interval History:   Patient reports feeling better. Sodium back to baseline at 130.  Potassium and Magnesium low this morning. Will discharge after electrolyte replacement.      Review of Systems   Constitutional: Negative for chills, diaphoresis and fever.   Respiratory: Negative for cough, shortness of breath and wheezing.    Cardiovascular: Negative for chest pain and palpitations.   Gastrointestinal: Negative for abdominal pain, nausea and vomiting.     Objective:     Vital Signs (Most Recent):  Temp: 97.7 °F (36.5 °C) (01/27/17 0422)  Pulse: 68 (01/27/17 0422)  Resp: 16 (01/27/17 0422)  BP: (!) 152/69 (01/27/17 0422)  SpO2: 96 % (01/27/17 0622) Vital Signs (24h Range):  Temp:  [97.7 °F (36.5 °C)-98.7 °F (37.1 °C)] 97.7 °F (36.5 °C)  Pulse:  [61-96] 68  Resp:  [13-18] 16  SpO2:  [95 %-100 %] 96 %  BP: (108-162)/(44-87) 152/69     Weight: 61.7 kg (136 lb)  Body mass index is 29.43 kg/(m^2).    Intake/Output Summary (Last 24 hours) at 01/27/17 0836  Last data filed at 01/27/17 0540   Gross per 24 hour   Intake          2354.58 ml   Output             1150 ml   Net          1204.58 ml      Physical Exam   Constitutional: She is oriented to person, place, and time. She appears well-developed and well-nourished. No distress.   Accessed Portacath right chest wall   HENT:   Head: Normocephalic and atraumatic.   Mouth/Throat: Oropharynx is clear and moist. No oropharyngeal exudate.   Eyes: Conjunctivae are normal. Pupils are equal, round, and reactive to light. No scleral icterus.   Neck: Neck supple.   Cardiovascular: Normal rate, regular rhythm, normal heart sounds and intact distal pulses.  Exam reveals no gallop and no friction rub.    No murmur heard.  Pulmonary/Chest: Effort normal and  breath sounds normal. No respiratory distress. She has no wheezes. She has no rales.   Abdominal: Soft. Bowel sounds are normal. She exhibits no distension. There is no tenderness. There is no rebound and no guarding.   Musculoskeletal: She exhibits no edema, tenderness or deformity.   Neurological: She is alert and oriented to person, place, and time. She exhibits normal muscle tone.   Skin: Skin is warm and dry. No rash noted. She is not diaphoretic. There is pallor.   Psychiatric: She has a normal mood and affect. Her behavior is normal.   Nursing note and vitals reviewed.      Significant Labs:   CBC:   Recent Labs  Lab 01/26/17  1024 01/27/17  0109   WBC 0.29* 0.50*   HGB 7.8* 8.9*   HCT 22.3* 25.2*   PLT 44* 41*     CMP:   Recent Labs  Lab 01/26/17  1024 01/27/17  0109   * 130*   K 4.1 3.2*   CL 88* 96   CO2 26 27   * 97   BUN 25* 15   CREATININE 1.0 0.6   CALCIUM 9.2 8.3*   PROT 6.4 5.2*   ALBUMIN 3.9 3.0*   BILITOT 0.6 1.3*   ALKPHOS 84 64   AST 15 13   ALT 16 12   ANIONGAP 11 7*   EGFRNONAA 54* >60     1/26/17 Phenobarbital Level    10.8 (15-40)    Significant Imaging:   No New    Assessment/Plan:      * Dehydration with hyponatremia  Sodium on Admit was 125.  Treated with Normal Saline Infusions. Today Sodium is 130, which patients baseline. Follow up on out-patient.      Essential hypertension  Difficult to treat hypertension at baseline. On Coreg 25 mg BID, Lisinopril 40 mg daily and chlorthalidone 25 mg BID with meals. Blood Pressure at Baseline. Resume Home Medications at discharge.        Nonintractable absence epilepsy without status epilepticus  Petit mal epilepsy since age 19 (last seizure age 26), On Carbomazepine 200 mg nightly and phenobarbital 64.8 mg every other night. Resume. Phenobarbital level 10.8 (15-40). Follow with PCP for any changes.       Acquired hypothyroidism  TSH 1.969. Continue levothyroxine 100 mg daily      Osteoarthritis of lumbar spine  DJD of Sacroiliac  Joint  Chronic: PRN Pain medications      Chemotherapy induced neutropenia  Last Chemotherapy was 1/17/17. WBC at that time 4.51. Yesterday WBC 0.29. Patient is afebrile, denies sick contact and is displaying no evidence of infection. Today WBC 0.50.  Afebrile. Neutropenic precautions.        Anemia due to antineoplastic chemotherapy  Diffuse Large B Cell Lymphoma  Hgb 7.8, Hct 22.3. Transfused 2 units RBCs, Hgb 8.9, Hct 25.2. Follow with Dr. Arredondo.      VTE Risk Mitigation         Ordered     Medium Risk of VTE  Once      01/26/17 2031     Place sequential compression device  Until discontinued      01/26/17 2031          Teresa Echols NP  Department of Hospital Medicine   [facility]

## 2017-01-27 NOTE — ASSESSMENT & PLAN NOTE
Petit mal epilepsy since age 19 (last seizure age 26), On Carbomazepine 200 mg nightly and phenobarbital 64.8 mg every other night. Resume. Phenobarbital level 10.8 (15-40). Follow with PCP for any changes.

## 2017-01-27 NOTE — PHARMACY MED REC
"MedMined Medication Reconciliation  Template    Patient was admitted on 1/26/2017 for Dehydration with hyponatremia.      Patient's prior to admission medication regimen was as follows:  Prescriptions Prior to Admission   Medication Sig Dispense Refill Last Dose    acetaminophen (TYLENOL) 325 MG tablet Take 325 mg by mouth every 4 to 6 hours as needed.    Taking    carbamazepine (EPITOL) 200 mg tablet Take 1 tablet (200 mg total) by mouth nightly. 90 tablet 2 Taking    carvedilol (COREG) 25 MG tablet Take 1 tablet (25 mg total) by mouth 2 (two) times daily with meals. (Patient taking differently: Take 25 mg by mouth 2 (two) times daily. ) 60 tablet 11 Taking    levothyroxine (SYNTHROID) 100 MCG tablet Take 1 tablet (100 mcg total) by mouth once daily. 90 tablet 3 Taking    lisinopril (PRINIVIL,ZESTRIL) 40 MG tablet Take 1 tablet (40 mg total) by mouth once daily. 90 tablet 1 Taking    phenobarbital (LUMINAL) 64.8 MG tablet Take 1 tablet (64.8 mg total) by mouth every evening. 90 tablet 3 Taking    [DISCONTINUED] chlorthalidone (HYGROTEN) 25 MG Tab Take 1 tablet (25 mg total) by mouth once daily. 30 tablet 11 Taking         Please add appropriate    SmartPhrase below:    Admission Medication Reconciliation - Pharmacy Consult Note    The home medication history was taken by Lacie Vargas CPHT.  Based on information gathered and subsequent review by the clinical pharmacist, the items below may need attention.    You may go to "Admission" then "Reconcile Home Medications" tabs to review and/or act upon these items.        No issues noted with the medication reconciliation.        Potential issues to be addressed PRIOR TO DISCHARGE  o None    Please address this information as you see fit.  Feel free to contact us if you have any questions or require assistance.    Darrin Allred  [phone]          "

## 2017-01-27 NOTE — ASSESSMENT & PLAN NOTE
Sodium on Admit was 125.  Treated with Normal Saline Infusions. Today Sodium is 130, which patients baseline. Follow up on out-patient.

## 2017-01-27 NOTE — ASSESSMENT & PLAN NOTE
Difficult to treat hypertension at baseline. On Coreg 25 mg BID, Lisinopril 40 mg daily and chlorthalidone 25 mg BID with meals. Blood Pressure at Baseline. Resume Home Medications at discharge.

## 2017-01-27 NOTE — PROGRESS NOTES
"Annette Garcia is a 81 y.o. female patient.  Got 2 units blood    Active Hospital Problems    Diagnosis  POA    *Dehydration with hyponatremia [E87.1]  Yes    Hyponatremia [E87.1]  Yes    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]  Yes    Chronic hyponatremia [E87.1]  Yes     Chronic    Chemotherapy induced neutropenia [D70.1, T45.1X5A]  Yes    DLBCL (diffuse large B cell lymphoma) [C83.30]  Yes    Osteoarthritis of lumbar spine [M47.816]  Yes     Chronic    Nonintractable absence epilepsy without status epilepticus [G40.A09]  Yes     Chronic    Essential hypertension [I10]  Yes     Chronic    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]  Yes     Chronic    Acquired hypothyroidism [E03.9]  Yes     Chronic      Resolved Hospital Problems    Diagnosis Date Resolved POA   No resolved problems to display.     Temp: 97.7 °F (36.5 °C) (01/27/17 0422)  Pulse: 68 (01/27/17 0422)  Resp: 16 (01/27/17 0422)  BP: (!) 152/69 (01/27/17 0422)  SpO2: 96 % (01/27/17 0622)  Weight: 61.7 kg (136 lb) (01/26/17 0930)  Height: 4' 9" (144.8 cm) (01/26/17 0930)    Subjective:  Symptoms:  Improved.  No malaise or weakness.    Diet:  She reports  nausea.    Pain:  She reports no pain.      Objective:  General Appearance:  Comfortable.    Vital signs: (most recent): Blood pressure (!) 152/69, pulse 68, temperature 97.7 °F (36.5 °C), temperature source Oral, resp. rate 16, height 4' 9" (1.448 m), weight 61.7 kg (136 lb), SpO2 96 %, not currently breastfeeding.  No fever.    Lungs:  She is not in respiratory distress.  No wheezes.    Heart: Normal rate.  Regular rhythm.      Assessment & Plan  1. Chemotherapy induced pancytopenia  2. Neutropenia without fever  3. DLBCL s/p chemotherapy    Feels better after blood transfusion and IV fluids.  No fevers.  Ongoing Neutropenia noted. Counseled on neutropenic precautions.  Ok to d/c home today.  Will see her for f/u in 1 week.    Tyson Arredondo MD  1/27/2017    "

## 2017-01-27 NOTE — CONSULTS
Consult Note  Hematology/Oncology      Consult Requested By: José Manuel Guidry MD    Reason for Consult: DLBCL    SUBJECTIVE:     History of Present Illness: 82 yo female with Stage III DLBCL underwent 6th and final round of chemotherapy on 1/17, now admitted with symptomatic anemia and hypotension secondary to dehydration, fatigue and pancytopenia from chemotherapy.    MEDS and ALLERGIES Reviewed    Past Medical History   Diagnosis Date    Cancer      colon    Hypertension     Petit mal epilepsy 1952    Scoliosis of lumbar spine     Seizures     Unspecified hypothyroidism      Past Surgical History   Procedure Laterality Date    Cholecystectomy       open    Appendectomy      Tonsillectomy      Vaginal hysterectomy w/ anterior and posterior vaginal repair      Cataract extraction, bilateral Bilateral     Colon surgery      Hysterectomy      Eye surgery Bilateral      cataract removal with lens implant    Portacath placement Right 09/2016    Back surgery  1988     vertebral fracture    Back surgery  02/2013     lumbar L2-5     Family History   Problem Relation Age of Onset    Pancreatic cancer Mother     Hypertension Father     Heart attack Father      Social History   Substance Use Topics    Smoking status: Never Smoker    Smokeless tobacco: None    Alcohol use No       Review of Systems:  Constitutional: no fever or chills. Has tiredness, fatigue  Eyes: negative for icterus and redness  Respiratory: no coug. Has ZARAGOZA.  Cardiovascular: no chest pain or palpitations  Gastrointestinal: negative for abdominal pain, melena and nausea  Hematologic/Lymphatic: no easy bruising or lymphadenopathy  Neurological: no seizures or tremors    OBJECTIVE:     Vital Signs (Most Recent)  Temp: 98.5 °F (36.9 °C) (01/26/17 2030)  Pulse: 72 (01/26/17 2030)  Resp: 18 (01/26/17 2030)  BP: (!) 156/62 (01/26/17 2030)  SpO2: 100 % (01/26/17 2030)    Pain Assessment: No pain reported at this time    Vital Signs Range  (Last 24H):  Temp:  [98 °F (36.7 °C)-98.7 °F (37.1 °C)]   Pulse:  [61-96]   Resp:  [13-18]   BP: ()/(44-87)   SpO2:  [96 %-100 %]     Physical Exam:  General: well developed, well nourished  Eyes: negative findings: conjunctivae and sclerae normal   Lungs:  normal respiratory effort and no wheeze  Cardiovascular: Heart: regular rate and rhythm and S1, S2 normal.   Extremities: no cyanosis or edema, or clubbing.  Abdomen: normal findings: no masses palpable and no organomegaly.  Lymph Nodes: No cervical or supraclavicular adenopathy  Neurologic: Normal strength and tone. No focal numbness or weakness  Mental status: Alert, oriented, thought content appropriate  Motor:grossly normal    Laboratory:  CBC with Differential:    Recent Labs  Lab 01/26/17  1024   WBC 0.29*   LYMPH 33.3  Test Not Performed   BASOPHIL 0.0   RBC 2.23*   HCT 22.3*   HGB 7.8*   *   MCH 35.0*   RDW 14.5   PLT 44*   MPV 11.3     CMP:    Recent Labs  Lab 01/26/17  1024   *   CALCIUM 9.2   ALBUMIN 3.9   PROT 6.4   *   K 4.1   CO2 26   CL 88*   BUN 25*   CREATININE 1.0   ALKPHOS 84   ALT 16   AST 15   BILITOT 0.6       ASSESSMENT/PLAN:   1. Pancytopenia secondary to chemotherapy  2. Symptomatic Anemia  3. Neutropenia without fever  4. Dehydration  5. DLBCL - in remission    Plan:  Generous IV Fluids  Transfuse 2 units PRBC  Neutropenic Precautions  No indication for antibiotics  D/c home tomorrow if she feels better and if she doesn't spike a temperature.

## 2017-01-27 NOTE — SUBJECTIVE & OBJECTIVE
Interval History:   Patient reports feeling better. Sodium back to baseline at 130.  Potassium and Magnesium low this morning. Will discharge after electrolyte replacement.      Review of Systems   Constitutional: Negative for chills, diaphoresis and fever.   Respiratory: Negative for cough, shortness of breath and wheezing.    Cardiovascular: Negative for chest pain and palpitations.   Gastrointestinal: Negative for abdominal pain, nausea and vomiting.     Objective:     Vital Signs (Most Recent):  Temp: 97.7 °F (36.5 °C) (01/27/17 0422)  Pulse: 68 (01/27/17 0422)  Resp: 16 (01/27/17 0422)  BP: (!) 152/69 (01/27/17 0422)  SpO2: 96 % (01/27/17 0622) Vital Signs (24h Range):  Temp:  [97.7 °F (36.5 °C)-98.7 °F (37.1 °C)] 97.7 °F (36.5 °C)  Pulse:  [61-96] 68  Resp:  [13-18] 16  SpO2:  [95 %-100 %] 96 %  BP: (108-162)/(44-87) 152/69     Weight: 61.7 kg (136 lb)  Body mass index is 29.43 kg/(m^2).    Intake/Output Summary (Last 24 hours) at 01/27/17 0836  Last data filed at 01/27/17 0540   Gross per 24 hour   Intake          2354.58 ml   Output             1150 ml   Net          1204.58 ml      Physical Exam   Constitutional: She is oriented to person, place, and time. She appears well-developed and well-nourished. No distress.   Accessed Portacath right chest wall   HENT:   Head: Normocephalic and atraumatic.   Mouth/Throat: Oropharynx is clear and moist. No oropharyngeal exudate.   Eyes: Conjunctivae are normal. Pupils are equal, round, and reactive to light. No scleral icterus.   Neck: Neck supple.   Cardiovascular: Normal rate, regular rhythm, normal heart sounds and intact distal pulses.  Exam reveals no gallop and no friction rub.    No murmur heard.  Pulmonary/Chest: Effort normal and breath sounds normal. No respiratory distress. She has no wheezes. She has no rales.   Abdominal: Soft. Bowel sounds are normal. She exhibits no distension. There is no tenderness. There is no rebound and no guarding.    Musculoskeletal: She exhibits no edema, tenderness or deformity.   Neurological: She is alert and oriented to person, place, and time. She exhibits normal muscle tone.   Skin: Skin is warm and dry. No rash noted. She is not diaphoretic. There is pallor.   Psychiatric: She has a normal mood and affect. Her behavior is normal.   Nursing note and vitals reviewed.      Significant Labs:   CBC:   Recent Labs  Lab 01/26/17  1024 01/27/17  0109   WBC 0.29* 0.50*   HGB 7.8* 8.9*   HCT 22.3* 25.2*   PLT 44* 41*     CMP:   Recent Labs  Lab 01/26/17  1024 01/27/17  0109   * 130*   K 4.1 3.2*   CL 88* 96   CO2 26 27   * 97   BUN 25* 15   CREATININE 1.0 0.6   CALCIUM 9.2 8.3*   PROT 6.4 5.2*   ALBUMIN 3.9 3.0*   BILITOT 0.6 1.3*   ALKPHOS 84 64   AST 15 13   ALT 16 12   ANIONGAP 11 7*   EGFRNONAA 54* >60     1/26/17 Phenobarbital Level    10.8 (15-40)    Significant Imaging:   No New

## 2017-01-27 NOTE — DISCHARGE SUMMARY
[facility]  Discharge Summary      Patient Name: Annette Garcia  MRN: [id number]  Admission Date: 1/26/2017   Hospital Length of Stay: 1 days  Discharge Date and Time: 1/27/2017 10:10 AM  Attending Physician: José Manuel Guidry MD   Discharging Provider: Teresa Echols NP  Primary Care Provider: Jose Valles MD      HPI:   Annette Garcia is a 82 yo  woman with petit mal epilepsy since age 19 (last seizure age 26), post-traumatic lumbar spondylosis, sacroiliac degenerative joint disease, difficult to control hypertension (-190), hypothyroidism, diffuse large B cell lymphoma (diagnosed 8/23/16, last chemo 1/17/17). She lives in [place], [place] with her , who has lung cancer. She has 7 children. She uses a straight cane to ambulate. Her primary care physician is Dr. Jose Torres. Her oncologist is Dr. Tyson Arredondo. Her cardiologist is Dr. Timmy Simmons.     She was sent to [facility] ED from Dr. Simmons's clinic on 1/26/17 for progressively worse generalized weakness for five days. She denied fever, chills, sore throat, nasal congestion, chest pain, shortness of breath, palpitations, abdominal pain, vomiting, diarrhea, dysuria, and rash. She had nausea but had not used any antiemetics. Her daughter reported that she was not eating or drinking much since her last treatment. Her initial blood pressure was 81/45, but was 140/76 after 1 liter of IV normal saline. Labs revealed WBC 0.29, hemoglobin 7.8, hematocrit 22.3, sodium 125, and creatinine 1.0 (from baseline of 0.6). Her labs on 1/16/17 showed WBC 4.51, Hgb 8.4, Hct 24.7 and sodium 130. Troponin was 0.042, and lactate was 2.3. She was admitted to [facility].      * No surgery found *      Indwelling Lines/Drains at time of discharge:   Lines/Drains/Airways     Central Venous Catheter Line                 Port A Cath Single Lumen 09/21/16 0925 right internal jugular 128  days          Airway                 Airway - Non-Surgical Mask -- days              Hospital Course:   She was transfused 2 units of pRBCs and Hgb increased to 8.9. She was given IV normal saline overnight, with improvement of sodium and creatinine to baseline. She felt better and ready for discharge the this morning. Potassium this morning is 3.1 and Magnesium 1.1.  Both replaced prior to discharge. She will resume all of her home medications except Chlorthalidone. She was instructed on neutropenic precautions. She will follow up with Dr. Arredondo in week.     Significant Diagnostic Studies: Labs:     Consults: Dr. Arredondo  BMP:   Recent Labs  Lab 01/26/17  1024 01/27/17  0109   * 97   * 130*   K 4.1 3.2*   CL 88* 96   CO2 26 27   BUN 25* 15   CREATININE 1.0 0.6   CALCIUM 9.2 8.3*   MG  --  1.1*   , CMP   Recent Labs  Lab 01/26/17  1024 01/27/17  0109   * 130*   K 4.1 3.2*   CL 88* 96   CO2 26 27   * 97   BUN 25* 15   CREATININE 1.0 0.6   CALCIUM 9.2 8.3*   PROT 6.4 5.2*   ALBUMIN 3.9 3.0*   BILITOT 0.6 1.3*   ALKPHOS 84 64   AST 15 13   ALT 16 12   ANIONGAP 11 7*   ESTGFRAFRICA >60 >60   EGFRNONAA 54* >60   , CBC   Recent Labs  Lab 01/26/17  1024 01/27/17  0109   WBC 0.29* 0.50*   HGB 7.8* 8.9*   HCT 22.3* 25.2*   PLT 44* 41*    and Troponin   Recent Labs  Lab 01/26/17  1024   TROPONINI 0.042*   BNP                    60  Phenabarb          10.6    Radiology: X-Ray: CXR: X-Ray Chest 1 View (CXR):   Results for orders placed or performed during the hospital encounter of 01/26/17   X-Ray Chest 1 View    Narrative    AP chest    Comparison: 12/15/16    Results: There is a port distal tip in the SVC.  The cardiac silhouette is midline.  The lungs are clear.  No pneumothorax.  The osseous structures appear within normal limits for age.    Impression     No acute process seen.      Electronically signed by: DANI LINCOLN MD  Date:     01/26/17  Time:    10:14        Pending Diagnostic  Studies:     None        Final Active Diagnoses:    Diagnosis Date Noted POA    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] 12/27/2016 Yes    Chronic hyponatremia [E87.1] 10/12/2016 Yes     Chronic    Chemotherapy induced neutropenia [D70.1, T45.1X5A] 10/12/2016 Yes    DLBCL (diffuse large B cell lymphoma) [C83.30] 09/09/2016 Yes    Osteoarthritis of lumbar spine [M47.816] 08/18/2016 Yes     Chronic    Nonintractable absence epilepsy without status epilepticus [G40.A09] 08/18/2016 Yes     Chronic    Essential hypertension [I10] 08/18/2016 Yes     Chronic    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] 08/18/2016 Yes     Chronic    Acquired hypothyroidism [E03.9] 08/18/2016 Yes     Chronic      Problems Resolved During this Admission:    Diagnosis Date Noted Date Resolved POA    PRINCIPAL PROBLEM:  Dehydration with hyponatremia [E87.1] 01/26/2017 01/27/2017 Yes    Hyponatremia [E87.1] 01/26/2017 01/27/2017 Yes      Essential hypertension  Difficult to treat hypertension at baseline. On Coreg 25 mg BID, Lisinopril 40 mg daily and chlorthalidone 25 mg BID with meals. Blood Pressure at Baseline. Resume Home Coreg and Lisinopril at discharge.  Stop taking chlorthalidone.       Nonintractable absence epilepsy without status epilepticus  Petit mal epilepsy since age 19 (last seizure age 26), On Carbomazepine 200 mg nightly and phenobarbital 64.8 mg every other night. Resume. Phenobarbital level 10.8 (15-40). Follow with PCP for any changes.       Acquired hypothyroidism  TSH 1.969. Continue levothyroxine 100 mg daily      Osteoarthritis of lumbar spine  DJD of Sacroiliac Joint  Chronic: PRN Pain medications      Chemotherapy induced neutropenia  Last Chemotherapy was 1/17/17. WBC at that time 4.51. Yesterday WBC 0.29. Patient is afebrile, denies sick contact and is displaying no evidence of infection. Today WBC 0.50.  Afebrile. Neutropenic precautions.        Chronic hyponatremia  Acute on  Chronic Hyponatremia, Dehydration  Sodium 125 today. On 1/16/17 Sodium was 130. Normal Saline IV infusion. Check labs in the morning.      Anemia due to antineoplastic chemotherapy  Diffuse Large B Cell Lymphoma  Hgb 7.8, Hct 22.3. Transfused 2 units RBCs, Hgb 8.9, Hct 25.2. Follow with Dr. Arredondo.        Discharged Condition: good    Disposition: Home or Self Care    Follow Up:  Follow-up Information     Follow up with Tyson Arredondo MD On 2/3/2017.    Specialties:  Hematology and Oncology, Hematology    Contact information:    [address] [place] [address]  [phone]          Patient Instructions:     Diet general   Order Specific Question Answer Comments   Additional restrictions: Neutropenic      Activity as tolerated   Order Comments: Avoid sick people and wash hands frequently     Call MD for:  temperature >100.4     Call MD for:  persistent nausea and vomiting or diarrhea     Call MD for:  difficulty breathing or increased cough     Call MD for:  increased confusion or weakness       Medications:  Reconciled Home Medications:   Current Discharge Medication List      CONTINUE these medications which have NOT CHANGED    Details   acetaminophen (TYLENOL) 325 MG tablet Take 325 mg by mouth every 4 (four) hours.      carbamazepine (EPITOL) 200 mg tablet Take 1 tablet (200 mg total) by mouth nightly.  Qty: 90 tablet, Refills: 2    Associated Diagnoses: Partial symptomatic epilepsy with complex partial seizures, not intractable, without status epilepticus      carvedilol (COREG) 25 MG tablet Take 1 tablet (25 mg total) by mouth 2 (two) times daily with meals.  Qty: 60 tablet, Refills: 11    Associated Diagnoses: Essential hypertension      docusate sodium (COLACE) 100 MG capsule Take 100 mg by mouth once daily. PT TAKES ONE TABLET 5 TIMES PER WEEK      GUAIFENESIN/CODEINE PHOSPHATE (ROBITUSSIN A-C ORAL) Take by mouth every 12 (twelve) hours.      levothyroxine (SYNTHROID) 100 MCG tablet Take 1 tablet (100  mcg total) by mouth once daily.  Qty: 90 tablet, Refills: 3      lisinopril (PRINIVIL,ZESTRIL) 40 MG tablet Take 1 tablet (40 mg total) by mouth once daily.  Qty: 90 tablet, Refills: 1    Associated Diagnoses: Essential hypertension      loratadine (CLARITIN) 10 mg tablet Take 10 mg by mouth once daily. TAKES WITH CHEMO      ondansetron (ZOFRAN-ODT) 4 MG TbDL Take 1 tablet (4 mg total) by mouth every 6 (six) hours as needed.  Qty: 30 tablet, Refills: 1    Associated Diagnoses: Nodular lymphoma of intra-abdominal lymph nodes      phenobarbital (LUMINAL) 64.8 MG tablet Take 1 tablet (64.8 mg total) by mouth every evening.  Qty: 90 tablet, Refills: 3    Associated Diagnoses: Partial symptomatic epilepsy with complex partial seizures, not intractable, without status epilepticus      polyethylene glycol (GLYCOLAX) 17 gram PwPk Take by mouth every other day. TAKES ONCE DAILY DURING THE WEEK OF CHEMO      predniSONE (DELTASONE) 50 MG Tab Take 2 tablets (100 mg total) by mouth as directed. Take 2 tabs daily for 3 days with each chemotherapy  Qty: 36 tablet, Refills: 0    Associated Diagnoses: DLBCL (diffuse large B cell lymphoma)         STOP taking these medications       chlorthalidone (HYGROTEN) 25 MG Tab Comments:   Reason for Stopping:             Time spent on the discharge of patient: 35 minutes    Teresa Echols NP  Department of Hospital Medicine  [facility]

## 2017-01-27 NOTE — PROGRESS NOTES
.Pharmacy New Medication Education     Patient accepted medication education.     Patient wants BEDSIDE DELIVERY. Pharmacy has been updated in the system.     Pharmacy educated patient on the following medications, using the teach-back method.   Apap  Carbamazepine  Cetirizine  Docusate  Levothyroxine  Zofran  Ondansetron  phenobarbital     Learners of pharmacy medication education included:  patient     Patient +/- learner response:  verbalize understanding

## 2017-01-27 NOTE — ASSESSMENT & PLAN NOTE
Last Chemotherapy was 1/17/17. WBC at that time 4.51. Yesterday WBC 0.29. Patient is afebrile, denies sick contact and is displaying no evidence of infection. Today WBC 0.50.  Afebrile. Neutropenic precautions.

## 2017-01-27 NOTE — PLAN OF CARE
Problem: Patient Care Overview  Goal: Plan of Care Review  Outcome: Ongoing (interventions implemented as appropriate)  Patient resting. Port accessed in ED. Two units of PRBCs given. Post admin labs obtained. Lab reported critical values: WBC 0.5 and platelet count of 41. On call team notified with the results. Neutropenic precautions maintained. VS stable. Patient safety maintained. Will continue to monitor.

## 2017-01-27 NOTE — PLAN OF CARE
tn met with pt will recuperate with daughter Ashley     Future Appointments  Date Time Provider Department Center   2/2/2017 9:40 AM Jose Valles MD [facility] MED [place] [facility]   2/3/2017 9:00 AM Tyson Arredondo MD [facility] HEM ONC [facility]   2/10/2017 11:20 AM Damien Bernal MD [facility] NEURO [place]   2/23/2017 10:20 AM Timmy Simmons MD [facility] CARDIO [place]   3/3/2017 8:00 AM LAB, [place] [facility] LAB [place]   3/7/2017 8:30 AM Tyson Arredondo MD [facility] HEM ONC [place] [facility]        01/27/17 1716   Discharge Assessment   Assessment Type Discharge Planning Assessment   Assessment information obtained from? Patient   Expected Length of Stay (days) 1   Communicated expected length of stay with patient/caregiver yes   If Healthcare Directive is received, is it scanned into Epic? yes   Prior to hospitilization cognitive status: Alert/Oriented   Prior to hospitalization functional status: Independent   Current cognitive status: Alert/Oriented   Current Functional Status: Independent   Arrived From home or self-care   Lives With (daughter Ashley --    )   Able to Return to Prior Arrangements yes   Is patient able to care for self after discharge? No   How many people do you have in your home that can help with your care after discharge? 1   Patient's perception of discharge disposition home or selfcare   Readmission Within The Last 30 Days no previous admission in last 30 days   Do you have any problems affording any of your prescribed medications? No   Is the patient taking medications as prescribed? yes   Do you have any financial concerns preventing you from receiving the healthcare you need? No   Does the patient have transportation to healthcare appointments? Yes   Transportation Available family or friend will provide   On Dialysis? No   Does the patient receive services at the Coumadin Clinic? No   Are there any open cases? No   Discharge Plan A Home;Home with family   Discharge Plan  B Home;Home with family;Home Health   Patient/Family In Agreement With Plan yes

## 2017-01-30 ENCOUNTER — PATIENT OUTREACH (OUTPATIENT)
Dept: ADMINISTRATIVE | Facility: CLINIC | Age: 82
End: 2017-01-30
Payer: MEDICARE

## 2017-01-30 NOTE — PATIENT INSTRUCTIONS
Discharge Instructions for Hyponatremia  You were diagnosed with hyponatremia, which means your blood level of sodium (salt) is too low. Salt is needed for the body and brain to work. Very low blood levels of sodium can be fatal. Symptoms can include headache, confusion, fatigue, muscle cramps, hallucinations, seizures, and coma. You have been treated to raise your blood levels of sodium. The following instructions will help you care for yourself at home as you have been instructed.  Home care  · Limit your intake of fluids. Drink only the amounts directed by your healthcare provider.  · Ask your healthcare provider what you should use to replace fluids if you are throwing up.  · Keep all follow-up appointments. Your provider needs to watch your condition closely.  To help prevent hyponatremia:  · Take all medicines exactly as directed. Certain medicines can lower blood sodium levels.  · If you have done something that makes you sweat a lot, drink fluids that contain salt and other electrolytes.   · Tell all healthcare providers what medicines you take. Mention all prescription and over-the-counter drugs and herbs.  · Have your sodium levels checked often. This is vital if you take a diuretic (medicine that helps your body get rid of water).  Follow-up  Schedule a follow-up visit as directed.  When to call your healthcare provider  Call your provider right away if you have any of the following:  · Severe tiredness  · Fainting  · Dizziness  · Loss of appetite  · Nausea or vomiting  · Confusion or forgetfullness  · Muscle spasms, cramping, or twitching  · Seizures  · Gait disturbances   © 2304-3256 [employer]. [address] [place] [address], [place] [address]. All rights reserved. This information is not intended as a substitute for professional medical care. Always follow your healthcare professional's instructions.

## 2017-01-31 LAB
BACTERIA BLD CULT: NORMAL
BACTERIA BLD CULT: NORMAL

## 2017-02-02 ENCOUNTER — OFFICE VISIT (OUTPATIENT)
Dept: FAMILY MEDICINE | Facility: CLINIC | Age: 82
End: 2017-02-02
Payer: MEDICARE

## 2017-02-02 ENCOUNTER — LAB VISIT (OUTPATIENT)
Dept: LAB | Facility: HOSPITAL | Age: 82
End: 2017-02-02
Attending: INTERNAL MEDICINE
Payer: MEDICARE

## 2017-02-02 VITALS
WEIGHT: 140.19 LBS | DIASTOLIC BLOOD PRESSURE: 60 MMHG | HEIGHT: 59 IN | OXYGEN SATURATION: 98 % | TEMPERATURE: 98 F | HEART RATE: 79 BPM | SYSTOLIC BLOOD PRESSURE: 152 MMHG | BODY MASS INDEX: 28.26 KG/M2

## 2017-02-02 DIAGNOSIS — I10 ESSENTIAL HYPERTENSION: Primary | Chronic | ICD-10-CM

## 2017-02-02 DIAGNOSIS — Z51.11 ENCOUNTER FOR ANTINEOPLASTIC CHEMOTHERAPY: ICD-10-CM

## 2017-02-02 DIAGNOSIS — C83.30 DLBCL (DIFFUSE LARGE B CELL LYMPHOMA): ICD-10-CM

## 2017-02-02 LAB
ALBUMIN SERPL BCP-MCNC: 4.1 G/DL
ALP SERPL-CCNC: 79 IU/L
ALT SERPL W/O P-5'-P-CCNC: 20 IU/L
ANION GAP SERPL CALC-SCNC: 11 MMOL/L
AST SERPL-CCNC: 19 IU/L
BASOPHILS # BLD AUTO: 0.01 K/UL
BASOPHILS NFR BLD: 0.2 %
BILIRUB SERPL-MCNC: 0.4 MG/DL
BUN SERPL-MCNC: 12 MG/DL
CALCIUM SERPL-MCNC: 9.4 MG/DL
CHLORIDE SERPL-SCNC: 92 MMOL/L
CO2 SERPL-SCNC: 30 MMOL/L
CREAT SERPL-MCNC: 0.46 MG/DL
DIFFERENTIAL METHOD: ABNORMAL
EOSINOPHIL # BLD AUTO: 0 K/UL
EOSINOPHIL NFR BLD: 0.2 %
ERYTHROCYTE [DISTWIDTH] IN BLOOD BY AUTOMATED COUNT: 16.6 %
EST. GFR  (AFRICAN AMERICAN): >60 ML/MIN/1.73 M^2
EST. GFR  (NON AFRICAN AMERICAN): >60 ML/MIN/1.73 M^2
GLUCOSE SERPL-MCNC: 100 MG/DL
HCT VFR BLD AUTO: 31.3 %
HGB BLD-MCNC: 10.7 G/DL
LYMPHOCYTES # BLD AUTO: 0.3 K/UL
LYMPHOCYTES NFR BLD: 7.1 %
MCH RBC QN AUTO: 33.1 PG
MCHC RBC AUTO-ENTMCNC: 34.2 %
MCV RBC AUTO: 97 FL
MONOCYTES # BLD AUTO: 0.8 K/UL
MONOCYTES NFR BLD: 19.5 %
NEUTROPHILS # BLD AUTO: 3 K/UL
NEUTROPHILS NFR BLD: 72.5 %
PLATELET # BLD AUTO: 101 K/UL
PMV BLD AUTO: 10.9 FL
POTASSIUM SERPL-SCNC: 4.1 MMOL/L
PROT SERPL-MCNC: 6.9 G/DL
RBC # BLD AUTO: 3.23 M/UL
SODIUM SERPL-SCNC: 133 MMOL/L
WBC # BLD AUTO: 4.1 K/UL

## 2017-02-02 PROCEDURE — 36415 COLL VENOUS BLD VENIPUNCTURE: CPT | Mod: PO

## 2017-02-02 PROCEDURE — 99212 OFFICE O/P EST SF 10 MIN: CPT | Mod: S$GLB,,, | Performed by: FAMILY MEDICINE

## 2017-02-02 PROCEDURE — 85025 COMPLETE CBC W/AUTO DIFF WBC: CPT | Mod: PO

## 2017-02-02 PROCEDURE — 80053 COMPREHEN METABOLIC PANEL: CPT | Mod: PO

## 2017-02-02 RX ORDER — LISINOPRIL 40 MG/1
40 TABLET ORAL DAILY
Qty: 90 TABLET | Refills: 3 | Status: SHIPPED | OUTPATIENT
Start: 2017-02-02 | End: 2018-03-17 | Stop reason: SDUPTHER

## 2017-02-03 ENCOUNTER — OFFICE VISIT (OUTPATIENT)
Dept: HEMATOLOGY/ONCOLOGY | Facility: CLINIC | Age: 82
End: 2017-02-03
Payer: MEDICARE

## 2017-02-03 VITALS
DIASTOLIC BLOOD PRESSURE: 72 MMHG | TEMPERATURE: 98 F | HEART RATE: 69 BPM | BODY MASS INDEX: 29.29 KG/M2 | HEIGHT: 58 IN | WEIGHT: 139.56 LBS | OXYGEN SATURATION: 96 % | SYSTOLIC BLOOD PRESSURE: 160 MMHG

## 2017-02-03 DIAGNOSIS — I10 ESSENTIAL HYPERTENSION: Chronic | ICD-10-CM

## 2017-02-03 DIAGNOSIS — C83.30 DLBCL (DIFFUSE LARGE B CELL LYMPHOMA): Primary | ICD-10-CM

## 2017-02-03 DIAGNOSIS — I70.1 BILATERAL RENAL ARTERY STENOSIS: ICD-10-CM

## 2017-02-03 DIAGNOSIS — T45.1X5A ANEMIA DUE TO ANTINEOPLASTIC CHEMOTHERAPY: ICD-10-CM

## 2017-02-03 DIAGNOSIS — D64.81 ANEMIA DUE TO ANTINEOPLASTIC CHEMOTHERAPY: ICD-10-CM

## 2017-02-03 DIAGNOSIS — E87.1 CHRONIC HYPONATREMIA: Chronic | ICD-10-CM

## 2017-02-03 PROCEDURE — 99213 OFFICE O/P EST LOW 20 MIN: CPT | Mod: PBBFAC,PO | Performed by: INTERNAL MEDICINE

## 2017-02-03 PROCEDURE — 99999 PR PBB SHADOW E&M-EST. PATIENT-LVL III: CPT | Mod: PBBFAC,,, | Performed by: INTERNAL MEDICINE

## 2017-02-03 PROCEDURE — 99214 OFFICE O/P EST MOD 30 MIN: CPT | Mod: S$PBB,,, | Performed by: INTERNAL MEDICINE

## 2017-02-03 NOTE — PROGRESS NOTES
Subjective:       Patient ID: Annette Garcia is a 81 y.o. female.    Chief Complaint: DLBCL (hospital follow up)    HPI     She is here for follow-up of Diffuse large B-cell lymphoma.  She was having abdominal discomfort for over 4 months and a CT scan done on 8/15/16 showed dilated loops of small bowel. She underwent explorative laparotomy (8/23) with resection of a segment of her small bowel by  - 2 different areas of strictures was noticed intraoperatively. Pathology revealed diffuse large B-cell lymphoma - germinal center origin (CD10 negative, BCL6 positive, MUM1 negative). Ki-67 was elevated at 75%.    PET scan showed stage III disease. No lymphoma on bone marrow biopsy.    She started R-CHOP chemotherapy 10/4/16.    PET scan (12/20/16) - Complete resolution of the hypermetabolic abnormality.     Received cycle 6 of R-CHOP chemotherapy on 1/17/17.  She was admitted to the hospital on 1/26 with pancytopenia, dehydration and symptomatic anemia and received blood.  Her chlorthalidone was discontinued secondary to hypotension.  She presents for a follow-up clinic visit today.  She's been checking her blood pressures at home.    Review of Systems   Constitutional: Negative for appetite change, fatigue, fever and unexpected weight change.   HENT: Negative for facial swelling and nosebleeds.    Eyes: Negative for photophobia and pain.   Respiratory: Negative for cough and shortness of breath.    Cardiovascular: Negative for chest pain and leg swelling.   Gastrointestinal: Negative for abdominal pain, blood in stool and nausea.   Genitourinary: Negative for dysuria and hematuria.   Skin: Negative for color change and rash.   Neurological: Negative for seizures, weakness and headaches.   Hematological: Negative for adenopathy. Does not bruise/bleed easily.       Objective:      Physical Exam   Constitutional: She is oriented to person, place, and time. She appears well-developed and well-nourished. No  distress.   HENT:   Head: Normocephalic and atraumatic.   Right Ear: Tympanic membrane, external ear and ear canal normal.   Left Ear: Tympanic membrane, external ear and ear canal normal.   Nose: Nose normal. No mucosal edema, sinus tenderness, septal deviation or nasal septal hematoma. No epistaxis. Right sinus exhibits no maxillary sinus tenderness and no frontal sinus tenderness. Left sinus exhibits no maxillary sinus tenderness and no frontal sinus tenderness.   Mouth/Throat: Oropharynx is clear and moist and mucous membranes are normal. Mucous membranes are not cyanotic. No oral lesions. No dental caries. No oropharyngeal exudate.   Hard and soft palate, tongue and posterior pharyngeal wall unremarkable   Eyes: Conjunctivae and lids are normal. No scleral icterus.   Neck: Trachea normal. Neck supple. No tracheal tenderness present. No tracheal deviation present. No thyroid mass (thyroid is non-tender) present.   Cardiovascular: Normal rate, regular rhythm, normal heart sounds, intact distal pulses and normal pulses.  Exam reveals no gallop.    No murmur heard.  No edema   Pulmonary/Chest: Effort normal and breath sounds normal. No accessory muscle usage or stridor. No respiratory distress. She has no decreased breath sounds. She has no wheezes. She has no rhonchi. She has no rales.   Abdominal: Soft. Bowel sounds are normal. She exhibits no distension and no mass. There is no hepatosplenomegaly, splenomegaly or hepatomegaly. There is no tenderness.       Musculoskeletal: She exhibits no edema or tenderness.   Lymphadenopathy:        Head (right side): No submental and no submandibular adenopathy present.        Head (left side): No submental and no submandibular adenopathy present.     She has no cervical adenopathy.     She has no axillary adenopathy.        Right: No supraclavicular adenopathy present.        Left: No supraclavicular adenopathy present.   Neurological: She is alert and oriented to person,  place, and time. She has normal strength. She is not disoriented. No cranial nerve deficit or sensory deficit.   Skin: Skin is warm and intact. No petechiae and no rash noted. She is not diaphoretic. No cyanosis. No pallor. Nails show no clubbing.   Psychiatric: She has a normal mood and affect. Her speech is normal and behavior is normal. Judgment and thought content normal. Her mood appears not anxious. She expresses no homicidal and no suicidal ideation.       Assessment:     1. DLBCL (diffuse large B cell lymphoma)    2. Anemia due to antineoplastic chemotherapy    3. Bilateral renal artery stenosis    4. Chronic hyponatremia    5. Essential hypertension          Plan:   In summary this is a 81-year-old female with stage III diffuse large B-cell lymphoma.  She status post 6 cycles of Rituxan CHOP chemotherapy for diffuse large B-cell lymphoma.    Labs from yesterday reviewed.  Her neutropenia has resolved.  Her platelets are coming up.  Her hemoglobin is greater than 10.  No need any further neutropenic precautions.  Reassured her.    Reviewed her blood pressure readings over the past 1 week.  She has bilateral renal artery stenosis.  Asked her to restart chlorthalidone.  She will continue to keep log of her blood pressure readings and follow-up with Dr. Simmons in 3 weeks.    I will see her back in the clinic for follow-up as scheduled next month.    Will plan to repeat a PET scan in April or May.  Will discuss port removal after the next PET scan.

## 2017-02-06 NOTE — PROGRESS NOTES
Patient ID: Annette Garcia is a 81 y.o. female.    Chief Complaint: Follow-up (hosp f/u)    HPI      Annette Garcia is a 81 y.o. female here following up on hospitalization doing well this time without problems.  Was dehydrated and had anemia-transfusion of blood.        Review of Symptoms    Constitutional  Neg activity change, No chills /fever   Resp  Neg hemoptysis, stridor, choking  CVS  Neg chest pain, palpitations    Physical Exam    Constitutional:  Oriented to person, place, and time.appears well-developed and well-nourished.  No distress.      HENT  Head: Normocephalic and atraumatic  Right Ear: External ear normal.   Left Ear: External ear normal.   Nose: External nose normal.   Mouth:  Moist mucus membranes.    Eyes:  Conjunctivae are normal. Right eye exhibits no discharge.  Left eye exhibits no discharge. No scleral icterus.  No periorbital edema    Cardiovascular:  Regular rate, Regular rhythm     No extrasystole  Normal S1-S2      Pulmonary/Chest:   Normal effort and breath sounds.  No wheezing, rhonchi or rales.    Musculoskeletal:  No edema. No obvious deformity No waisting       Neurological:  Alert and oriented to person, place, and time.   Coordination normal.     Skin:   Skin is warm and dry.  No diaphoresis.   No rash noted.     Psychiatric: Normal mood and affect. Behavior is normal.  Judgment and thought content normal.       Assessment / Plan:      ICD-10-CM ICD-9-CM   1. Essential hypertension I10 401.9     Essential hypertension  -     lisinopril (PRINIVIL,ZESTRIL) 40 MG tablet; Take 1 tablet (40 mg total) by mouth once daily.  Dispense: 90 tablet; Refill: 3

## 2017-02-23 ENCOUNTER — OFFICE VISIT (OUTPATIENT)
Dept: CARDIOLOGY | Facility: CLINIC | Age: 82
End: 2017-02-23
Payer: MEDICARE

## 2017-02-23 VITALS
HEIGHT: 58 IN | HEART RATE: 68 BPM | SYSTOLIC BLOOD PRESSURE: 176 MMHG | OXYGEN SATURATION: 98 % | DIASTOLIC BLOOD PRESSURE: 70 MMHG

## 2017-02-23 DIAGNOSIS — G40.A09 NONINTRACTABLE ABSENCE EPILEPSY WITHOUT STATUS EPILEPTICUS: Chronic | ICD-10-CM

## 2017-02-23 DIAGNOSIS — E87.1 CHRONIC HYPONATREMIA: Chronic | ICD-10-CM

## 2017-02-23 DIAGNOSIS — E78.5 HYPERLIPIDEMIA, UNSPECIFIED HYPERLIPIDEMIA TYPE: ICD-10-CM

## 2017-02-23 DIAGNOSIS — E03.9 ACQUIRED HYPOTHYROIDISM: Chronic | ICD-10-CM

## 2017-02-23 DIAGNOSIS — D64.81 ANEMIA DUE TO ANTINEOPLASTIC CHEMOTHERAPY: ICD-10-CM

## 2017-02-23 DIAGNOSIS — T45.1X5A ANEMIA DUE TO ANTINEOPLASTIC CHEMOTHERAPY: ICD-10-CM

## 2017-02-23 DIAGNOSIS — I15.0 RENOVASCULAR HYPERTENSION: Primary | ICD-10-CM

## 2017-02-23 DIAGNOSIS — Z98.890 S/P EXPLORATORY LAPAROTOMY: ICD-10-CM

## 2017-02-23 DIAGNOSIS — I70.1 BILATERAL RENAL ARTERY STENOSIS: ICD-10-CM

## 2017-02-23 PROCEDURE — 99999 PR PBB SHADOW E&M-EST. PATIENT-LVL III: CPT | Mod: PBBFAC,,, | Performed by: INTERNAL MEDICINE

## 2017-02-23 PROCEDURE — 99213 OFFICE O/P EST LOW 20 MIN: CPT | Mod: S$PBB,,, | Performed by: INTERNAL MEDICINE

## 2017-02-23 PROCEDURE — 99213 OFFICE O/P EST LOW 20 MIN: CPT | Mod: PBBFAC,PO | Performed by: INTERNAL MEDICINE

## 2017-02-23 RX ORDER — CLOPIDOGREL BISULFATE 75 MG/1
75 TABLET ORAL DAILY
Qty: 30 TABLET | Refills: 11 | Status: ON HOLD | OUTPATIENT
Start: 2017-02-23 | End: 2017-03-17 | Stop reason: HOSPADM

## 2017-02-23 RX ORDER — DIPHENHYDRAMINE HCL 25 MG
50 CAPSULE ORAL ONCE
Status: CANCELLED | OUTPATIENT
Start: 2017-02-23 | End: 2017-02-23

## 2017-02-23 RX ORDER — CHLORTHALIDONE 25 MG/1
25 TABLET ORAL DAILY
Refills: 11 | COMMUNITY
Start: 2017-02-11 | End: 2017-03-14 | Stop reason: CLARIF

## 2017-02-23 NOTE — PROGRESS NOTES
Subjective:    Patient ID:  Annette Garcia is a 81 y.o. female who presents for follow-up of Hypertension      HPI  82 y/o female with hx of difficult to control HTN currently on 3 anti-hypertensives including a diuretic (actually currently being held due to dehydration), anemia, acquired hypothyroidism, diffuse large B-cell lymphoma on chemo (last PET showed remission) with last chemo session recently. She presents for f/u HTN.   BP log still with 's-170's. Renal artery doppler with results suggestive of left main 60-99% stenosis, right renal 60-90%, and comparable kidney size (I think there is a typo with the right renal artery measuring 1.1 cm).   Last clinic visit was feeling very weak, orthostatic, and looked unwell. I sent her to ED where she was found to be profoundly anemic and dehydrated, was admitted, IVF hydration, PRBC transfusion, symptomatically improved and discharged home.   She looks much better today and does not have the previously mentioned symptoms. Cleared by H/O for procedures. SBP in clinic today in the 170's. She denies CP, SOB, orthopnea, PND, syncope.       Review of Systems   Constitution: Positive for weakness. Negative for malaise/fatigue.   HENT: Negative for congestion and headaches.    Eyes: Negative for blurred vision.   Cardiovascular: Positive for dyspnea on exertion. Negative for chest pain, claudication, cyanosis, irregular heartbeat, leg swelling, near-syncope, orthopnea, palpitations, paroxysmal nocturnal dyspnea and syncope.   Respiratory: Negative for shortness of breath.    Endocrine: Negative for polyuria.   Hematologic/Lymphatic: Negative for bleeding problem.   Skin: Negative for itching and rash.   Musculoskeletal: Positive for muscle weakness. Negative for joint swelling and muscle cramps.   Gastrointestinal: Negative for abdominal pain, hematemesis, hematochezia, melena, nausea and vomiting.   Genitourinary: Negative for dysuria and hematuria.   Neurological:  Negative for dizziness, focal weakness, light-headedness and loss of balance.   Psychiatric/Behavioral: Negative for depression. The patient is not nervous/anxious.         Objective:    Physical Exam   Constitutional: She is oriented to person, place, and time. She appears well-developed and well-nourished.   HENT:   Head: Normocephalic and atraumatic.   Neck: Neck supple. No JVD present.   Cardiovascular: Normal rate, regular rhythm and normal heart sounds.    Pulses:       Carotid pulses are 2+ on the right side, and 2+ on the left side.       Radial pulses are 2+ on the right side, and 2+ on the left side.        Femoral pulses are 2+ on the right side, and 2+ on the left side.       Dorsalis pedis pulses are 2+ on the right side, and 2+ on the left side.        Posterior tibial pulses are 2+ on the right side, and 2+ on the left side.   Pulmonary/Chest: Effort normal and breath sounds normal.   Abdominal: Soft. Bowel sounds are normal.   Musculoskeletal: She exhibits no edema.   Neurological: She is alert and oriented to person, place, and time.   Skin: Skin is warm and dry.   Psychiatric: She has a normal mood and affect. Her behavior is normal. Thought content normal.         Assessment:       1. Renovascular hypertension    2. Hyperlipidemia, unspecified hyperlipidemia type    3. Nonintractable absence epilepsy without status epilepticus    4. Bilateral renal artery stenosis    5. Anemia due to antineoplastic chemotherapy    6. Acquired hypothyroidism    7. Chronic hyponatremia    8. S/P exploratory laparotomy      82 y/o female with hx and presentation as above. Doing better now that she is not dehydrated and anemia has improved s/p PRBC transfusion. HTN remains an issue and she is still on multiple medications. Chlorthalidone currently being held due to dehydration. Renal US reviewed and she has indications for renal angiogram and possible intervention. We discussed the etiology, management, treatment of  renal artery stenosis and the decision was made to proceed with YOLANDA.       Plan:       -Renal angiogram +/- intervention  -RCFA access with 6 Fr sheath, JENNIFER, BMW, IVUS  -The procedure was discussed with the pt along with the risks/benefits and the pt voiced understanding. All questions were answered and written consents obtained.

## 2017-02-24 ENCOUNTER — TELEPHONE (OUTPATIENT)
Dept: CARDIOLOGY | Facility: CLINIC | Age: 82
End: 2017-02-24

## 2017-03-02 ENCOUNTER — TELEPHONE (OUTPATIENT)
Dept: HEMATOLOGY/ONCOLOGY | Facility: CLINIC | Age: 82
End: 2017-03-02

## 2017-03-02 NOTE — PRE ADMISSION SCREENING
Called and spoke with pt, arrival time 7:30am. Verbalizes understanding to all pre-op instructions, denies questions.

## 2017-03-03 ENCOUNTER — LAB VISIT (OUTPATIENT)
Dept: LAB | Facility: HOSPITAL | Age: 82
End: 2017-03-03
Attending: INTERNAL MEDICINE
Payer: MEDICARE

## 2017-03-03 DIAGNOSIS — D64.81 ANEMIA DUE TO ANTINEOPLASTIC CHEMOTHERAPY: ICD-10-CM

## 2017-03-03 DIAGNOSIS — C83.30 DLBCL (DIFFUSE LARGE B CELL LYMPHOMA): ICD-10-CM

## 2017-03-03 DIAGNOSIS — Z51.11 ENCOUNTER FOR ANTINEOPLASTIC CHEMOTHERAPY: ICD-10-CM

## 2017-03-03 DIAGNOSIS — T45.1X5A ANEMIA DUE TO ANTINEOPLASTIC CHEMOTHERAPY: ICD-10-CM

## 2017-03-03 DIAGNOSIS — D53.9 NUTRITIONAL ANEMIA: ICD-10-CM

## 2017-03-03 LAB
ALBUMIN SERPL BCP-MCNC: 4.4 G/DL
ALP SERPL-CCNC: 74 IU/L
ALT SERPL W/O P-5'-P-CCNC: 20 IU/L
ANION GAP SERPL CALC-SCNC: 11 MMOL/L
AST SERPL-CCNC: 22 IU/L
BASOPHILS # BLD AUTO: 0.02 K/UL
BASOPHILS NFR BLD: 0.4 %
BILIRUB SERPL-MCNC: 0.7 MG/DL
BUN SERPL-MCNC: 19 MG/DL
CALCIUM SERPL-MCNC: 9.3 MG/DL
CHLORIDE SERPL-SCNC: 87 MMOL/L
CO2 SERPL-SCNC: 27 MMOL/L
CREAT SERPL-MCNC: 0.74 MG/DL
DIFFERENTIAL METHOD: ABNORMAL
EOSINOPHIL # BLD AUTO: 0.2 K/UL
EOSINOPHIL NFR BLD: 4 %
ERYTHROCYTE [DISTWIDTH] IN BLOOD BY AUTOMATED COUNT: 16.9 %
EST. GFR  (AFRICAN AMERICAN): >60 ML/MIN/1.73 M^2
EST. GFR  (NON AFRICAN AMERICAN): >60 ML/MIN/1.73 M^2
FOLATE SERPL-MCNC: 6 NG/ML
GLUCOSE SERPL-MCNC: 106 MG/DL
HCT VFR BLD AUTO: 27.7 %
HGB BLD-MCNC: 9.4 G/DL
LYMPHOCYTES # BLD AUTO: 0.5 K/UL
LYMPHOCYTES NFR BLD: 10.6 %
MCH RBC QN AUTO: 34.2 PG
MCHC RBC AUTO-ENTMCNC: 33.9 %
MCV RBC AUTO: 101 FL
MONOCYTES # BLD AUTO: 0.7 K/UL
MONOCYTES NFR BLD: 14.8 %
NEUTROPHILS # BLD AUTO: 3.4 K/UL
NEUTROPHILS NFR BLD: 70.2 %
PLATELET # BLD AUTO: 143 K/UL
PMV BLD AUTO: 10 FL
POTASSIUM SERPL-SCNC: 4.2 MMOL/L
PROT SERPL-MCNC: 7 G/DL
RBC # BLD AUTO: 2.75 M/UL
SODIUM SERPL-SCNC: 125 MMOL/L
VIT B12 SERPL-MCNC: 334 PG/ML
WBC # BLD AUTO: 4.8 K/UL

## 2017-03-03 PROCEDURE — 85025 COMPLETE CBC W/AUTO DIFF WBC: CPT | Mod: PO

## 2017-03-03 PROCEDURE — 36415 COLL VENOUS BLD VENIPUNCTURE: CPT | Mod: PO

## 2017-03-03 PROCEDURE — 82607 VITAMIN B-12: CPT | Mod: PO

## 2017-03-03 PROCEDURE — 82746 ASSAY OF FOLIC ACID SERUM: CPT | Mod: PO

## 2017-03-03 PROCEDURE — 80053 COMPREHEN METABOLIC PANEL: CPT | Mod: PO

## 2017-03-08 ENCOUNTER — SURGERY (OUTPATIENT)
Age: 82
End: 2017-03-08

## 2017-03-08 ENCOUNTER — HOSPITAL ENCOUNTER (OUTPATIENT)
Facility: HOSPITAL | Age: 82
Discharge: HOME | End: 2017-03-08
Attending: INTERNAL MEDICINE | Admitting: INTERNAL MEDICINE
Payer: MEDICARE

## 2017-03-08 VITALS
OXYGEN SATURATION: 100 % | DIASTOLIC BLOOD PRESSURE: 71 MMHG | SYSTOLIC BLOOD PRESSURE: 164 MMHG | HEIGHT: 57 IN | BODY MASS INDEX: 29.56 KG/M2 | RESPIRATION RATE: 17 BRPM | WEIGHT: 137 LBS | HEART RATE: 65 BPM | TEMPERATURE: 98 F

## 2017-03-08 DIAGNOSIS — I15.0 RENOVASCULAR HYPERTENSION: ICD-10-CM

## 2017-03-08 DIAGNOSIS — I70.1 BILATERAL RENAL ARTERY STENOSIS: Primary | ICD-10-CM

## 2017-03-08 LAB
ANION GAP SERPL CALC-SCNC: 11 MMOL/L
BASOPHILS # BLD AUTO: 0.01 K/UL
BASOPHILS NFR BLD: 0.2 %
BUN SERPL-MCNC: 15 MG/DL
CALCIUM SERPL-MCNC: 9.1 MG/DL
CHLORIDE SERPL-SCNC: 88 MMOL/L
CO2 SERPL-SCNC: 26 MMOL/L
CREAT SERPL-MCNC: 0.8 MG/DL
DIFFERENTIAL METHOD: ABNORMAL
EOSINOPHIL # BLD AUTO: 0.1 K/UL
EOSINOPHIL NFR BLD: 2.9 %
ERYTHROCYTE [DISTWIDTH] IN BLOOD BY AUTOMATED COUNT: 16.6 %
EST. GFR  (AFRICAN AMERICAN): >60 ML/MIN/1.73 M^2
EST. GFR  (NON AFRICAN AMERICAN): >60 ML/MIN/1.73 M^2
GLUCOSE SERPL-MCNC: 108 MG/DL
HCT VFR BLD AUTO: 25.9 %
HGB BLD-MCNC: 8.9 G/DL
INR PPP: 1.1
LYMPHOCYTES # BLD AUTO: 0.4 K/UL
LYMPHOCYTES NFR BLD: 9.3 %
MCH RBC QN AUTO: 34.6 PG
MCHC RBC AUTO-ENTMCNC: 34.4 %
MCV RBC AUTO: 101 FL
MONOCYTES # BLD AUTO: 0.5 K/UL
MONOCYTES NFR BLD: 13.3 %
NEUTROPHILS # BLD AUTO: 3 K/UL
NEUTROPHILS NFR BLD: 74.1 %
PERIPHERAL STENOSIS: ABNORMAL
PLATELET # BLD AUTO: 106 K/UL
PMV BLD AUTO: 9.7 FL
POTASSIUM SERPL-SCNC: 3.9 MMOL/L
PROTHROMBIN TIME: 11.5 SEC
RBC # BLD AUTO: 2.57 M/UL
SODIUM SERPL-SCNC: 125 MMOL/L
WBC # BLD AUTO: 4.07 K/UL

## 2017-03-08 PROCEDURE — 36252 INS CATH REN ART 1ST BILAT: CPT | Mod: ,,, | Performed by: INTERNAL MEDICINE

## 2017-03-08 PROCEDURE — 36415 COLL VENOUS BLD VENIPUNCTURE: CPT

## 2017-03-08 PROCEDURE — 25000003 PHARM REV CODE 250

## 2017-03-08 PROCEDURE — 80048 BASIC METABOLIC PNL TOTAL CA: CPT

## 2017-03-08 PROCEDURE — 85025 COMPLETE CBC W/AUTO DIFF WBC: CPT

## 2017-03-08 PROCEDURE — 25500020 PHARM REV CODE 255

## 2017-03-08 PROCEDURE — 27200085 CATH LAB PROCEDURE

## 2017-03-08 PROCEDURE — 63600175 PHARM REV CODE 636 W HCPCS

## 2017-03-08 PROCEDURE — 99153 MOD SED SAME PHYS/QHP EA: CPT

## 2017-03-08 PROCEDURE — 99152 MOD SED SAME PHYS/QHP 5/>YRS: CPT | Mod: ,,, | Performed by: INTERNAL MEDICINE

## 2017-03-08 PROCEDURE — 85610 PROTHROMBIN TIME: CPT

## 2017-03-08 RX ORDER — ONDANSETRON 2 MG/ML
4 INJECTION INTRAMUSCULAR; INTRAVENOUS EVERY 12 HOURS PRN
Status: DISCONTINUED | OUTPATIENT
Start: 2017-03-08 | End: 2017-03-08 | Stop reason: HOSPADM

## 2017-03-08 RX ORDER — DIPHENHYDRAMINE HCL 50 MG
50 CAPSULE ORAL ONCE
Status: DISCONTINUED | OUTPATIENT
Start: 2017-03-08 | End: 2017-03-08 | Stop reason: HOSPADM

## 2017-03-08 NOTE — NURSING
Venous sheath pulled per MD order. Manual pressure held for 20 minutes. No bleeding from venous site, small amt of bleeding that was present at arterial site seems to have stopped oozing. NO hematoma present. Site soft and no pain. New dressing placed. Will continue to monitor.

## 2017-03-08 NOTE — NURSING
Small amt of bleeding from right groin site. Dressing removed and small track ooze from arterial site and small amt of bleeding from venous sheath noted. MD Simmons notified. NO hematoma present. Order to pull venous sheath and hold manual pressure. Will continue to monitor.

## 2017-03-08 NOTE — NURSING
Site clean dry and intact, no patient complaints, patient alert, ready for discharge discharge teaching complete and patient verbalized understanding.

## 2017-03-08 NOTE — IP AVS SNAPSHOT
[facility]  [address]  [place] [address]  Phone: [phone]           Patient Discharge Instructions     Our goal is to set you up for success. This packet includes information on your condition, medications, and your home care. It will help you to care for yourself so you don't get sicker and need to go back to the hospital.     Please ask your nurse if you have any questions.        There are many details to remember when preparing to leave the hospital. Here is what you will need to do:    1. Take your medicine. If you are prescribed medications, review your Medication List in the following pages. You may have new medications to  at the pharmacy and others that you'll need to stop taking. Review the instructions for how and when to take your medications. Talk with your doctor or nurses if you are unsure of what to do.     2. Go to your follow-up appointments. Specific follow-up information is listed in the following pages. Your may be contacted by a transition nurse or clinical provider about future appointments. Be sure we have all of the phone numbers to reach you, if needed. Please contact your provider's office if you are unable to make an appointment.     3. Watch for warning signs. Your doctor or nurse will give you detailed warning signs to watch for and when to call for assistance. These instructions may also include educational information about your condition. If you experience any of warning signs to your health, call your doctor.               [facility]  Unless otherwise directed by your provider, please contact [facility], our nurse care line that is available for 24/7 assistance.     [phone] (toll-free)    Registered nurses in the [facility] provide clinical advisement, health education, appointment booking, and other advisory services.                    ** Verify the list of medication(s) below is accurate and up to date. Carry this  with you in case of emergency. If your medications have changed, please notify your healthcare provider.             Medication List      CHANGE how you take these medications        Additional Info                      carvedilol 25 MG tablet   Commonly known as:  COREG   Quantity:  60 tablet   Refills:  11   Dose:  25 mg   What changed:  when to take this    Instructions:  Take 1 tablet (25 mg total) by mouth 2 (two) times daily with meals.     Begin Date    AM    Noon    PM    Bedtime         CONTINUE taking these medications        Additional Info                      acetaminophen 325 MG tablet   Commonly known as:  TYLENOL   Refills:  0   Dose:  325 mg    Instructions:  Take 325 mg by mouth every 4 to 6 hours as needed.     Begin Date    AM    Noon    PM    Bedtime       carbamazepine 200 mg tablet   Commonly known as:  EPITOL   Quantity:  90 tablet   Refills:  2   Dose:  200 mg    Instructions:  Take 1 tablet (200 mg total) by mouth nightly.     Begin Date    AM    Noon    PM    Bedtime       chlorthalidone 25 MG Tab   Commonly known as:  HYGROTEN   Refills:  11   Dose:  25 mg    Instructions:  Take 25 mg by mouth once daily.     Begin Date    AM    Noon    PM    Bedtime       clopidogrel 75 mg tablet   Commonly known as:  PLAVIX   Quantity:  30 tablet   Refills:  11   Dose:  75 mg    Instructions:  Take 1 tablet (75 mg total) by mouth once daily.     Begin Date    AM    Noon    PM    Bedtime       levothyroxine 100 MCG tablet   Commonly known as:  SYNTHROID   Quantity:  90 tablet   Refills:  3   Dose:  100 mcg    Instructions:  Take 1 tablet (100 mcg total) by mouth once daily.     Begin Date    AM    Noon    PM    Bedtime       lisinopril 40 MG tablet   Commonly known as:  PRINIVIL,ZESTRIL   Quantity:  90 tablet   Refills:  3   Dose:  40 mg    Instructions:  Take 1 tablet (40 mg total) by mouth once daily.     Begin Date    AM    Noon    PM    Bedtime       phenobarbital 64.8 MG tablet   Commonly known as:   LUMINAL   Quantity:  90 tablet   Refills:  3   Dose:  64.8 mg    Instructions:  Take 1 tablet (64.8 mg total) by mouth every evening.     Begin Date    AM    Noon    PM    Bedtime                  Please bring to all follow up appointments:    1. A copy of your discharge instructions.  2. All medicines you are currently taking in their original bottles.  3. Identification and insurance card.    Please arrive 15 minutes ahead of scheduled appointment time.    Please call 24 hours in advance if you must reschedule your appointment and/or time.        Your Scheduled Appointments     Mar 14, 2017  8:30 AM CDT   Established Patient Visit with MD [place] Flores - Hematology Oncology ([place])    [address] [place] [address]  [place] [address]   [phone]            Mar 24, 2017 11:20 AM CDT   Established Patient Visit with MD Geno Glass - Neurology ([facility])    [address] [place] [address], [address]  [facility] [address]   [phone]            Apr 07, 2017  8:00 AM CDT   Established Patient Visit with MD Geno Glass - Neurology ([facility])    [address] [place] [address], [address]  [facility] [address]   [phone]                Discharge Instructions     Future Orders    Activity as tolerated     Call MD for:  redness, tenderness, or signs of infection (pain, swelling, redness, odor or green/yellow discharge around incision site)     Call MD for:  severe uncontrolled pain     Call MD for:  temperature >100.4     Diet general     Questions:    Total calories:      Fat restriction, if any:      Protein restriction, if any:      Na restriction, if any:      Fluid restriction:      Additional restrictions:  Cardiac (Low Na/Chol)    Lifting restrictions     Comments:    No lifting over 10 pounds until seen in clinic        Primary Diagnosis     Your primary diagnosis was:  High Blood Pressure Due To Kidney Disease      Admission Information     Date & Time Provider Department CSN    3/8/2017   "7:06 AM Timmy Simmons MD [facility] [id number]      Care Providers     Provider Role Specialty Primary office phone    Timmy Simmons MD Attending Provider INTERVENTIONAL CARDIOLOGY [phone]    Timmy Simmons MD Surgeon  INTERVENTIONAL CARDIOLOGY [phone]      Your Vitals Were     BP Pulse Temp Resp Height Weight    164/71 (BP Location: Left arm, Patient Position: Lying, BP Method: Automatic) 65 97.7 °F (36.5 °C) (Oral) 17 4' 9" (1.448 m) 62.1 kg (137 lb)    SpO2 BMI             100% 29.65 kg/m2         Recent Lab Values     No lab values to display.      Allergies as of 3/8/2017        Reactions    Adhesive Itching, Blisters    Penicillins Anaphylaxis    Tramadol Hives    Avelox [Moxifloxacin] Rash    Facial and arm itching and redness. Pt states throat closes when given.    Amoxil [Amoxicillin]     Aspridrox [Aspirin, Buffered]     Codeine Other (See Comments)    Throat swelling    Keflex [Cephalexin]     Norvasc [Amlodipine]     Red Dye Hives    Sulfa (Sulfonamide Antibiotics)     Tylenol [Acetaminophen]     Has reaction to Tylenol with red dye and unable to take Extra Strength Tylenol/ CAN ONLY TOLERATE REG STRENGTH TYLENOL      Advance Directives     An advance directive is a document which, in the event you are no longer able to make decisions for yourself, tells your healthcare team what kind of treatment you do or do not want to receive, or who you would like to make those decisions for you.  If you do not currently have an advance directive, [facility] encourages you to create one.  For more information call:  [phone]), [phone] ([phone]),  or log on to [email or web address].        Language Assistance Services     ATTENTION: Language assistance services are available, free of charge. Please call [phone].      ATENCIÓN: Si habla español, tiene a cox disposición servicios gratuitos de asistencia lingüística. Llame al [phone].     CHÚ " Ý: N?u b?n nói Ti?ng Vi?t, có các d?ch v? h? tr? ngôn ng? mi?n phí dành cho b?n. G?i s? [phone].        Pneumonmia Discharge Instructions                 [facility] complies with applicable Federal civil rights laws and does not discriminate on the basis of race, color, national origin, age, disability, or sex.

## 2017-03-08 NOTE — NURSING
All discharge instructions given, stressed importance of monitoring site for bleeding and to follow up asap if any oozing happens. MD number highlighted on discharge paperwork. Pt verbalizes understanding and denies questions. Vitals stable. Spoke with MICHELL Munson about discharge. Informed of pt track oozing that has now stopped. Instructed to walk pt down hallway and re-assess. Pt walked down to 2A area and used restroom, site checked again and NO bleeding, oozing, or hematoma present. Pt dressed herself with assistance from daughter. Pt taken down to private car via wheelchair.

## 2017-03-08 NOTE — INTERVAL H&P NOTE
The patient has been examined and the H&P has been reviewed:    I concur with the findings and no changes have occurred since H&P was written.    Anesthesia/Surgery risks, benefits and alternative options discussed and understood by patient/family.          Active Hospital Problems    Diagnosis  POA    Renovascular hypertension [I15.0]  Yes      Resolved Hospital Problems    Diagnosis Date Resolved POA   No resolved problems to display.

## 2017-03-08 NOTE — DISCHARGE SUMMARY
[facility]  Discharge Summary      Admit Date: 3/8/2017    Discharge Date and Time:  03/08/2017 5:35 PM    Attending Physician: Timmy Simmons MD    Reason for Admission: Renal angiogram    Procedures Performed: Procedure(s) (LRB):  ANGIOGRAM-RENAL (N/A)    Hospital Course (synopsis of major diagnoses, care, treatment, and services provided during the course of the hospital stay): The pt was brought to the cath lab and renal aortogram/angiogram performed. Severe ostial LRA stenosis noted and I was unable to cannulate past the lesion from CFA access. Attempted radial however has very small caliber vessel and unable to place sheath. Has accessory right renal artery that may have a stenosis - difficult to see, however, LRA appears amenable to intervention and will stage procedure and attempt from brachial access at a later date.      Final Diagnoses:    Principal Problem: Renovascular hypertension   Secondary Diagnoses:   Active Hospital Problems    Diagnosis  POA    *Renovascular hypertension [I15.0]  Yes      Resolved Hospital Problems    Diagnosis Date Resolved POA   No resolved problems to display.   aneamia  Hypothyroidism  LBC lyphoma    Discharged Condition: good    Disposition: Home or Self Care    Follow Up/Patient Instructions:     Medications:  Reconciled Home Medications:   Discharge Medication List as of 3/8/2017  4:11 PM      CONTINUE these medications which have NOT CHANGED    Details   acetaminophen (TYLENOL) 325 MG tablet Take 325 mg by mouth every 4 to 6 hours as needed. , Until Discontinued, Historical Med      carbamazepine (EPITOL) 200 mg tablet Take 1 tablet (200 mg total) by mouth nightly., Starting 10/10/2016, Until Discontinued, Normal      carvedilol (COREG) 25 MG tablet Take 1 tablet (25 mg total) by mouth 2 (two) times daily with meals., Starting 1/19/2017, Until Discontinued, Normal      chlorthalidone (HYGROTEN) 25 MG Tab Take 25 mg by mouth once daily., Starting  2/11/2017, Until Discontinued, Historical Med      clopidogrel (PLAVIX) 75 mg tablet Take 1 tablet (75 mg total) by mouth once daily., Starting 2/23/2017, Until Fri 2/23/18, Normal      levothyroxine (SYNTHROID) 100 MCG tablet Take 1 tablet (100 mcg total) by mouth once daily., Starting 3/17/2016, Until Discontinued, Normal      lisinopril (PRINIVIL,ZESTRIL) 40 MG tablet Take 1 tablet (40 mg total) by mouth once daily., Starting 2/2/2017, Until Fri 2/2/18, Normal      phenobarbital (LUMINAL) 64.8 MG tablet Take 1 tablet (64.8 mg total) by mouth every evening., Starting 10/10/2016, Until Discontinued, Normal             Discharge Procedure Orders  Diet general   Order Specific Question Answer Comments   Additional restrictions: Cardiac (Low Na/Chol)      Activity as tolerated     Lifting restrictions   Order Comments: No lifting over 10 pounds until seen in clinic     Call MD for:  temperature >100.4     Call MD for:  severe uncontrolled pain     Call MD for:  redness, tenderness, or signs of infection (pain, swelling, redness, odor or green/yellow discharge around incision site)

## 2017-03-08 NOTE — PROGRESS NOTES
Post Procedure Note: Renal angiogram    The pt was brought to the cath lab and under sterile technique, RCFA access was obtained without difficulty. Images were obtained in multiple views and Severe ostial LRA stenosis noted. Severe angulation of artery prohibitive of intervention from groin and right radial artery too small for sheath. Multiple attempts and procedure aborted due to contrast and radiation load. Please see full report for details. The pt tolerated the procedure well without complications. Perclose closure device used with successful hemostasis.     Vitals:    03/08/17 1215 03/08/17 1230 03/08/17 1245 03/08/17 1300   BP: (!) 162/70 (!) 180/67 (!) 154/71 122/60   BP Location: Left arm Left arm Left arm Left arm   Patient Position: Lying Lying Lying Lying   BP Method: Automatic Automatic Automatic Automatic   Pulse: 62 61 63 60   Resp: 19 18 17 18   Temp:       TempSrc:       SpO2: 99% 97% 100% 99%   Weight:       Height:             Gen: NAD  Ext: 2+ fem/DP/PT pulses, no evidence of hematoma    Plan:  -Post cath care per protocol  -ASA for life  -Plan for staged intervention of LRA in about 2 weeks via brachial approach

## 2017-03-08 NOTE — H&P (VIEW-ONLY)
Subjective:    Patient ID:  Annette Garcia is a 81 y.o. female who presents for follow-up of Hypertension      HPI  80 y/o female with hx of difficult to control HTN currently on 3 anti-hypertensives including a diuretic (actually currently being held due to dehydration), anemia, acquired hypothyroidism, diffuse large B-cell lymphoma on chemo (last PET showed remission) with last chemo session recently. She presents for f/u HTN.   BP log still with 's-170's. Renal artery doppler with results suggestive of left main 60-99% stenosis, right renal 60-90%, and comparable kidney size (I think there is a typo with the right renal artery measuring 1.1 cm).   Last clinic visit was feeling very weak, orthostatic, and looked unwell. I sent her to ED where she was found to be profoundly anemic and dehydrated, was admitted, IVF hydration, PRBC transfusion, symptomatically improved and discharged home.   She looks much better today and does not have the previously mentioned symptoms. Cleared by H/O for procedures. SBP in clinic today in the 170's. She denies CP, SOB, orthopnea, PND, syncope.       Review of Systems   Constitution: Positive for weakness. Negative for malaise/fatigue.   HENT: Negative for congestion and headaches.    Eyes: Negative for blurred vision.   Cardiovascular: Positive for dyspnea on exertion. Negative for chest pain, claudication, cyanosis, irregular heartbeat, leg swelling, near-syncope, orthopnea, palpitations, paroxysmal nocturnal dyspnea and syncope.   Respiratory: Negative for shortness of breath.    Endocrine: Negative for polyuria.   Hematologic/Lymphatic: Negative for bleeding problem.   Skin: Negative for itching and rash.   Musculoskeletal: Positive for muscle weakness. Negative for joint swelling and muscle cramps.   Gastrointestinal: Negative for abdominal pain, hematemesis, hematochezia, melena, nausea and vomiting.   Genitourinary: Negative for dysuria and hematuria.   Neurological:  Negative for dizziness, focal weakness, light-headedness and loss of balance.   Psychiatric/Behavioral: Negative for depression. The patient is not nervous/anxious.         Objective:    Physical Exam   Constitutional: She is oriented to person, place, and time. She appears well-developed and well-nourished.   HENT:   Head: Normocephalic and atraumatic.   Neck: Neck supple. No JVD present.   Cardiovascular: Normal rate, regular rhythm and normal heart sounds.    Pulses:       Carotid pulses are 2+ on the right side, and 2+ on the left side.       Radial pulses are 2+ on the right side, and 2+ on the left side.        Femoral pulses are 2+ on the right side, and 2+ on the left side.       Dorsalis pedis pulses are 2+ on the right side, and 2+ on the left side.        Posterior tibial pulses are 2+ on the right side, and 2+ on the left side.   Pulmonary/Chest: Effort normal and breath sounds normal.   Abdominal: Soft. Bowel sounds are normal.   Musculoskeletal: She exhibits no edema.   Neurological: She is alert and oriented to person, place, and time.   Skin: Skin is warm and dry.   Psychiatric: She has a normal mood and affect. Her behavior is normal. Thought content normal.         Assessment:       1. Renovascular hypertension    2. Hyperlipidemia, unspecified hyperlipidemia type    3. Nonintractable absence epilepsy without status epilepticus    4. Bilateral renal artery stenosis    5. Anemia due to antineoplastic chemotherapy    6. Acquired hypothyroidism    7. Chronic hyponatremia    8. S/P exploratory laparotomy      80 y/o female with hx and presentation as above. Doing better now that she is not dehydrated and anemia has improved s/p PRBC transfusion. HTN remains an issue and she is still on multiple medications. Chlorthalidone currently being held due to dehydration. Renal US reviewed and she has indications for renal angiogram and possible intervention. We discussed the etiology, management, treatment of  renal artery stenosis and the decision was made to proceed with YOLANDA.       Plan:       -Renal angiogram +/- intervention  -RCFA access with 6 Fr sheath, JENNIFER, BMW, IVUS  -The procedure was discussed with the pt along with the risks/benefits and the pt voiced understanding. All questions were answered and written consents obtained.

## 2017-03-09 ENCOUNTER — PATIENT MESSAGE (OUTPATIENT)
Dept: CARDIOLOGY | Facility: CLINIC | Age: 82
End: 2017-03-09

## 2017-03-10 LAB
POC ACTIVATED CLOTTING TIME K: 245 SEC (ref 74–137)
POC ACTIVATED CLOTTING TIME K: 250 SEC (ref 74–137)
POC ACTIVATED CLOTTING TIME K: 260 SEC (ref 74–137)
SAMPLE: ABNORMAL

## 2017-03-13 ENCOUNTER — HOSPITAL ENCOUNTER (INPATIENT)
Facility: HOSPITAL | Age: 82
LOS: 3 days | Discharge: NURSING, REHAB OR LONG-TERM CARE | DRG: 811 | End: 2017-03-17
Attending: EMERGENCY MEDICINE | Admitting: HOSPITALIST
Payer: MEDICARE

## 2017-03-13 DIAGNOSIS — E83.42 HYPOMAGNESEMIA: ICD-10-CM

## 2017-03-13 DIAGNOSIS — S42.351A: ICD-10-CM

## 2017-03-13 DIAGNOSIS — S42.91XA SHOULDER FRACTURE, RIGHT, CLOSED, INITIAL ENCOUNTER: Primary | ICD-10-CM

## 2017-03-13 DIAGNOSIS — I15.0 RENOVASCULAR HYPERTENSION: Chronic | ICD-10-CM

## 2017-03-13 DIAGNOSIS — R52 PAIN: ICD-10-CM

## 2017-03-13 DIAGNOSIS — D64.9 SYMPTOMATIC ANEMIA: ICD-10-CM

## 2017-03-13 DIAGNOSIS — E87.1 CHRONIC HYPONATREMIA: Chronic | ICD-10-CM

## 2017-03-13 DIAGNOSIS — I70.1 BILATERAL RENAL ARTERY STENOSIS: ICD-10-CM

## 2017-03-13 DIAGNOSIS — S62.609A FINGER FRACTURE, RIGHT, CLOSED, INITIAL ENCOUNTER: ICD-10-CM

## 2017-03-13 DIAGNOSIS — G40.A09 NONINTRACTABLE ABSENCE EPILEPSY WITHOUT STATUS EPILEPTICUS: Chronic | ICD-10-CM

## 2017-03-13 DIAGNOSIS — T45.1X5A ANEMIA DUE TO ANTINEOPLASTIC CHEMOTHERAPY: Chronic | ICD-10-CM

## 2017-03-13 DIAGNOSIS — D69.6 THROMBOCYTOPENIA, UNSPECIFIED: ICD-10-CM

## 2017-03-13 DIAGNOSIS — D64.81 ANEMIA DUE TO ANTINEOPLASTIC CHEMOTHERAPY: Chronic | ICD-10-CM

## 2017-03-13 DIAGNOSIS — M46.1 DEGENERATIVE JOINT DISEASE OF SACROILIAC JOINT: Chronic | ICD-10-CM

## 2017-03-13 DIAGNOSIS — S62.636A CLOSED DISPLACED FRACTURE OF DISTAL PHALANX OF RIGHT LITTLE FINGER, INITIAL ENCOUNTER: ICD-10-CM

## 2017-03-13 DIAGNOSIS — Z98.890 S/P EXPLORATORY LAPAROTOMY: ICD-10-CM

## 2017-03-13 DIAGNOSIS — E03.9 ACQUIRED HYPOTHYROIDISM: Chronic | ICD-10-CM

## 2017-03-13 DIAGNOSIS — M47.816 OSTEOARTHRITIS OF LUMBAR SPINE, UNSPECIFIED SPINAL OSTEOARTHRITIS COMPLICATION STATUS: Chronic | ICD-10-CM

## 2017-03-13 DIAGNOSIS — I21.4 NSTEMI (NON-ST ELEVATED MYOCARDIAL INFARCTION): ICD-10-CM

## 2017-03-13 DIAGNOSIS — C83.30 DLBCL (DIFFUSE LARGE B CELL LYMPHOMA): Chronic | ICD-10-CM

## 2017-03-13 LAB
ALBUMIN SERPL BCP-MCNC: 3.3 G/DL
ALP SERPL-CCNC: 59 U/L
ALT SERPL W/O P-5'-P-CCNC: 12 U/L
ANION GAP SERPL CALC-SCNC: 7 MMOL/L
AST SERPL-CCNC: 17 U/L
BILIRUB SERPL-MCNC: 0.4 MG/DL
BUN SERPL-MCNC: 16 MG/DL
CALCIUM SERPL-MCNC: 8.3 MG/DL
CHLORIDE SERPL-SCNC: 94 MMOL/L
CO2 SERPL-SCNC: 26 MMOL/L
CREAT SERPL-MCNC: 0.7 MG/DL
EST. GFR  (AFRICAN AMERICAN): >60 ML/MIN/1.73 M^2
EST. GFR  (NON AFRICAN AMERICAN): >60 ML/MIN/1.73 M^2
GLUCOSE SERPL-MCNC: 129 MG/DL
POTASSIUM SERPL-SCNC: 3.6 MMOL/L
PROT SERPL-MCNC: 5.4 G/DL
SODIUM SERPL-SCNC: 127 MMOL/L

## 2017-03-13 PROCEDURE — 25000003 PHARM REV CODE 250: Performed by: EMERGENCY MEDICINE

## 2017-03-13 PROCEDURE — 99285 EMERGENCY DEPT VISIT HI MDM: CPT | Mod: 25

## 2017-03-13 PROCEDURE — 82550 ASSAY OF CK (CPK): CPT

## 2017-03-13 PROCEDURE — 96360 HYDRATION IV INFUSION INIT: CPT | Mod: 59

## 2017-03-13 PROCEDURE — 96365 THER/PROPH/DIAG IV INF INIT: CPT

## 2017-03-13 PROCEDURE — 36430 TRANSFUSION BLD/BLD COMPNT: CPT

## 2017-03-13 PROCEDURE — 96361 HYDRATE IV INFUSION ADD-ON: CPT | Mod: 59

## 2017-03-13 PROCEDURE — 12001 RPR S/N/AX/GEN/TRNK 2.5CM/<: CPT

## 2017-03-13 PROCEDURE — 84100 ASSAY OF PHOSPHORUS: CPT

## 2017-03-13 PROCEDURE — 0HQFXZZ REPAIR RIGHT HAND SKIN, EXTERNAL APPROACH: ICD-10-PCS | Performed by: EMERGENCY MEDICINE

## 2017-03-13 PROCEDURE — 83735 ASSAY OF MAGNESIUM: CPT

## 2017-03-13 PROCEDURE — 80053 COMPREHEN METABOLIC PANEL: CPT

## 2017-03-13 PROCEDURE — 85025 COMPLETE CBC W/AUTO DIFF WBC: CPT

## 2017-03-13 PROCEDURE — 93005 ELECTROCARDIOGRAM TRACING: CPT

## 2017-03-13 PROCEDURE — 84484 ASSAY OF TROPONIN QUANT: CPT

## 2017-03-13 PROCEDURE — 96366 THER/PROPH/DIAG IV INF ADDON: CPT

## 2017-03-13 RX ORDER — SODIUM CHLORIDE 9 MG/ML
125 INJECTION, SOLUTION INTRAVENOUS ONCE
Status: COMPLETED | OUTPATIENT
Start: 2017-03-13 | End: 2017-03-13

## 2017-03-13 RX ORDER — LIDOCAINE HYDROCHLORIDE 10 MG/ML
1 INJECTION INFILTRATION; PERINEURAL ONCE
Status: COMPLETED | OUTPATIENT
Start: 2017-03-14 | End: 2017-03-13

## 2017-03-13 RX ORDER — ACETAMINOPHEN 325 MG/1
650 TABLET ORAL
Status: COMPLETED | OUTPATIENT
Start: 2017-03-13 | End: 2017-03-13

## 2017-03-13 RX ADMIN — LIDOCAINE HYDROCHLORIDE 1 ML: 10 INJECTION, SOLUTION INFILTRATION; PERINEURAL at 11:03

## 2017-03-13 RX ADMIN — ACETAMINOPHEN 650 MG: 325 TABLET ORAL at 08:03

## 2017-03-13 RX ADMIN — SODIUM CHLORIDE 125 ML/HR: 0.9 INJECTION, SOLUTION INTRAVENOUS at 08:03

## 2017-03-13 NOTE — IP AVS SNAPSHOT
[facility]  [address]  [place] [address]  Phone: [phone]           Patient Discharge Instructions     Our goal is to set you up for success. This packet includes information on your condition, medications, and your home care. It will help you to care for yourself so you don't get sicker and need to go back to the hospital.     Please ask your nurse if you have any questions.        There are many details to remember when preparing to leave the hospital. Here is what you will need to do:    1. Take your medicine. If you are prescribed medications, review your Medication List in the following pages. You may have new medications to  at the pharmacy and others that you'll need to stop taking. Review the instructions for how and when to take your medications. Talk with your doctor or nurses if you are unsure of what to do.     2. Go to your follow-up appointments. Specific follow-up information is listed in the following pages. Your may be contacted by a transition nurse or clinical provider about future appointments. Be sure we have all of the phone numbers to reach you, if needed. Please contact your provider's office if you are unable to make an appointment.     3. Watch for warning signs. Your doctor or nurse will give you detailed warning signs to watch for and when to call for assistance. These instructions may also include educational information about your condition. If you experience any of warning signs to your health, call your doctor.               [facility]  Unless otherwise directed by your provider, please contact [facility], our nurse care line that is available for 24/7 assistance.     [phone] (toll-free)    Registered nurses in the [facility] provide clinical advisement, health education, appointment booking, and other advisory services.                    ** Verify the list of medication(s) below is accurate and up to date. Carry this  with you in case of emergency. If your medications have changed, please notify your healthcare provider.             Medication List      START taking these medications        Additional Info                      ferrous sulfate 325 (65 FE) MG EC tablet   Refills:  0   Dose:  325 mg    Last time this was given:  325 mg on 3/17/2017  9:43 AM   Instructions:  Take 1 tablet (325 mg total) by mouth once daily.     Begin Date    AM    Noon    PM    Bedtime       magnesium oxide 400 mg tablet   Commonly known as:  MAG-OX   Refills:  0   Dose:  400 mg    Last time this was given:  400 mg on 3/17/2017  9:43 AM   Instructions:  Take 1 tablet (400 mg total) by mouth once daily.     Begin Date    AM    Noon    PM    Bedtime       ondansetron 8 MG Tbdl   Commonly known as:  ZOFRAN-ODT   Refills:  0   Dose:  8 mg    Instructions:  Take 1 tablet (8 mg total) by mouth every 8 (eight) hours as needed.     Begin Date    AM    Noon    PM    Bedtime       polyethylene glycol 17 gram Pwpk   Commonly known as:  GLYCOLAX   Refills:  0   Dose:  17 g    Instructions:  Take 17 g by mouth 2 (two) times daily as needed (Constipation).     Begin Date    AM    Noon    PM    Bedtime         CHANGE how you take these medications        Additional Info                      acetaminophen 325 MG tablet   Commonly known as:  TYLENOL   Refills:  0   Dose:  650 mg   What changed:    - how much to take  - when to take this  - reasons to take this    Last time this was given:  650 mg on 3/17/2017  9:43 AM   Instructions:  Take 2 tablets (650 mg total) by mouth every 4 (four) hours as needed for Pain.     Begin Date    AM    Noon    PM    Bedtime       carvedilol 25 MG tablet   Commonly known as:  COREG   Quantity:  60 tablet   Refills:  11   Dose:  25 mg   What changed:  when to take this    Last time this was given:  25 mg on 3/17/2017  9:43 AM   Instructions:  Take 1 tablet (25 mg total) by mouth 2 (two) times daily with meals.     Begin Date    AM     Noon    PM    Bedtime         CONTINUE taking these medications        Additional Info                      carbamazepine 200 mg tablet   Commonly known as:  EPITOL   Quantity:  90 tablet   Refills:  2   Dose:  200 mg    Instructions:  Take 1 tablet (200 mg total) by mouth nightly.     Begin Date    AM    Noon    PM    Bedtime       levothyroxine 100 MCG tablet   Commonly known as:  SYNTHROID   Quantity:  90 tablet   Refills:  3   Dose:  100 mcg    Last time this was given:  100 mcg on 3/17/2017  6:35 AM   Instructions:  Take 1 tablet (100 mcg total) by mouth once daily.     Begin Date    AM    Noon    PM    Bedtime       lisinopril 40 MG tablet   Commonly known as:  PRINIVIL,ZESTRIL   Quantity:  90 tablet   Refills:  3   Dose:  40 mg    Last time this was given:  20 mg on 3/17/2017  9:43 AM   Instructions:  Take 1 tablet (40 mg total) by mouth once daily.     Begin Date    AM    Noon    PM    Bedtime       phenobarbital 64.8 MG tablet   Commonly known as:  LUMINAL   Quantity:  90 tablet   Refills:  3   Dose:  64.8 mg    Last time this was given:  64.8 mg on 3/16/2017  8:17 PM   Instructions:  Take 1 tablet (64.8 mg total) by mouth every evening.     Begin Date    AM    Noon    PM    Bedtime         STOP taking these medications     chlorthalidone 25 MG Tab   Commonly known as:  HYGROTEN       clopidogrel 75 mg tablet   Commonly known as:  PLAVIX            Where to Get Your Medications      You can get these medications from any pharmacy     You don't need a prescription for these medications     acetaminophen 325 MG tablet    ferrous sulfate 325 (65 FE) MG EC tablet    magnesium oxide 400 mg tablet         Information about where to get these medications is not yet available     ! Ask your nurse or doctor about these medications     ondansetron 8 MG Tbdl    polyethylene glycol 17 gram Pwpk                  Please bring to all follow up appointments:    1. A copy of your discharge instructions.  2. All medicines  you are currently taking in their original bottles.  3. Identification and insurance card.    Please arrive 15 minutes ahead of scheduled appointment time.    Please call 24 hours in advance if you must reschedule your appointment and/or time.        Your Scheduled Appointments     Mar 24, 2017 11:20 AM CDT   Established Patient Visit with MD Geno Glass - Neurology ([facility])    [address] [place] [address], [address]  [facility] [address]   [phone]            Apr 04, 2017  8:00 AM CDT   New Patient with MD [place] Martinez Jr. - Orthopedics ([place])    [address]  [facility] [address]   [phone]            Apr 07, 2017  8:00 AM CDT   Established Patient Visit with MD Geno Glass - Neurology ([facility])    [address] [facility], [address]  [facility] [address]   [phone]            Apr 18, 2017 10:00 AM CDT   Established Patient Visit with BOB Marie - Orthopedics ([place])    [address]  [place] [address]   [phone]              Follow-up Information     Follow up with Kevin Saravia Jr, MD On 4/4/2017.    Specialties:  Hand Surgery, Orthopedic Surgery    Why:  8:00am    Contact information:    [address]  [address]  [facility] [address]  [phone]          Follow up with [facility].    Specialty:  Rehabilitation    Why:  SNF    Contact information:    [place] [id number] [phone]          Discharge Instructions     Future Orders    Activity: Per rehab recommendations     CBC auto differential     Comments:    Weekly on Wednesday. Fax to Dr. Arredondo at [phone]    Diet Adult Regular     Questions:    Total calories:      Fat restriction, if any:      Protein restriction, if any:      Na restriction, if any:      Fluid restriction:      Additional restrictions:      Full code     Intake and output per facility protocol     Skin assessment every shift  "     Up in chair/ wheel chair     Vital signs per facility protocol     Weight bearing status:     Comments:    Non-weight bearing to RUE. Keep RUE in sling at all times.        Discharge Instructions       FRACTURE, SHOULDER (ENGLISH) View Edit Remove   ANEMIA (ENGLISH) View Edit Remove   BLOOD TRANSFUSION (ADULT), WHEN YOU NEED A (ENGLISH) View Edit Remove   IRON TABLETS, CAPSULES, EXTENDED-RELEASE TABLETS (ENGLISH) View Edit Remove   ONDANSETRON TABLETS (ENGLISH) View Edit Remove   POLYETHYLENE GLYCOL POWDER (ENGLISH) View Edit Remove   MAGNESIUM SALTS CAPSULES OR TABLETS, IMMEDIATE RELEASE (ENGLISH)                                                      Discharge References/Attachments     FRACTURE, SHOULDER (ENGLISH)    ANEMIA (ENGLISH)    BLOOD TRANSFUSION (ADULT), WHEN YOU NEED A (ENGLISH)    IRON TABLETS, CAPSULES, EXTENDED-RELEASE TABLETS (ENGLISH)    ONDANSETRON TABLETS (ENGLISH)    POLYETHYLENE GLYCOL POWDER (ENGLISH)    MAGNESIUM SALTS CAPSULES OR TABLETS, IMMEDIATE RELEASE (ENGLISH)        Primary Diagnosis     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      Admission Information     Date & Time Provider Department CSN    3/13/2017  6:45 PM Ryan Espinosa MD [facility] [id number]      Care Providers     Provider Role Specialty Primary office phone    Ryan Espinosa MD Attending Provider Hospitalist [phone]    Kevin Saravia Jr., MD Consulting Physician  Hand Surgery [phone]    Ryan Espinosa MD Physician  Hospitalist  [phone]    Tyson Arredondo MD Consulting Physician  Hematology and Oncology [phone]      Your Vitals Were     BP Pulse Temp Resp Height Weight    142/63 64 98.2 °F (36.8 °C) 18 4' 9" (1.448 m) 63.5 kg (140 lb)    SpO2 BMI             94% 30.3 kg/m2         Recent Lab Values     No lab values to display.      Pending Labs     Order Current Status    Prepare RBC 2 Units; anemia Preliminary result      Allergies as of 3/17/2017        " Reactions    Adhesive Itching, Blisters    Penicillins Anaphylaxis    Tramadol Hives    Avelox [Moxifloxacin] Rash    Facial and arm itching and redness. Pt states throat closes when given.    Amoxil [Amoxicillin]     Aspridrox [Aspirin, Buffered]     Codeine Other (See Comments)    Throat swelling    Keflex [Cephalexin]     Norvasc [Amlodipine]     Red Dye Hives    Sulfa (Sulfonamide Antibiotics)     Tylenol [Acetaminophen]     Has reaction to Tylenol with red dye and unable to take Extra Strength Tylenol/ CAN ONLY TOLERATE REG STRENGTH TYLENOL    Vicks Vaporub [Camphor-eucalyptus Oil-menthol]       Advance Directives     An advance directive is a document which, in the event you are no longer able to make decisions for yourself, tells your healthcare team what kind of treatment you do or do not want to receive, or who you would like to make those decisions for you.  If you do not currently have an advance directive, [facility] encourages you to create one.  For more information call:  [phone]), [phone] ([phone]),  or log on to [email or web address].        Language Assistance Services     ATTENTION: Language assistance services are available, free of charge. Please call [phone].      ATENCIÓN: Si bekahla kalie, tiene a cox disposición servicios gratuitos de asistencia lingüística. Llame al [phone].     [facility] Ý: N?u b?n nói Ti?ng Vi?t, có các d?ch v? h? tr? ngôn ng? mi?n phí dành cho b?n. G?i s? [phone].        Blood Transfusion Reaction Signs and Symptoms     The blood you have received has been matched for you as carefully as possible. Most patients who receive a blood transfusion do not experience problems. However, there can be a delayed reaction that happens a few weeks after your blood transfusion. Contact your physician immediately if you experience any NEW SYMPTOMS listed below:     Fever greater than 100.4 degrees    Chills   Yellow color to your skin or  eyes(Jaundice)   Back pain, chest pain, or pain at the infusion site   Weakness (more than usual)   Discomfort or uneasiness more than usual (Malaise)   Nausea or vomiting   Shortness of breath, wheezing, or coughing   Higher or lower blood pressure than normal   Skin rash, itching, skin redness, or localized swelling (example: hands or feet)   Urinating less than normal   Urine appears reddish or orange and is darker than normal      Remember that some these signs may already exist for you--such as having chronic back pain or high blood pressure. You only need to look for and report to your doctor any new occurrences since your blood transfusion that are of concern.        Pneumonmia Discharge Instructions                 [facility] complies with applicable Federal civil rights laws and does not discriminate on the basis of race, color, national origin, age, disability, or sex.

## 2017-03-13 NOTE — IP AVS SNAPSHOT
[facility]  [address]  [place] [address]  Phone: [phone]           Patient Discharge Instructions     Our goal is to set you up for success. This packet includes information on your condition, medications, and your home care. It will help you to care for yourself so you don't get sicker and need to go back to the hospital.     Please ask your nurse if you have any questions.        There are many details to remember when preparing to leave the hospital. Here is what you will need to do:    1. Take your medicine. If you are prescribed medications, review your Medication List in the following pages. You may have new medications to  at the pharmacy and others that you'll need to stop taking. Review the instructions for how and when to take your medications. Talk with your doctor or nurses if you are unsure of what to do.     2. Go to your follow-up appointments. Specific follow-up information is listed in the following pages. Your may be contacted by a transition nurse or clinical provider about future appointments. Be sure we have all of the phone numbers to reach you, if needed. Please contact your provider's office if you are unable to make an appointment.     3. Watch for warning signs. Your doctor or nurse will give you detailed warning signs to watch for and when to call for assistance. These instructions may also include educational information about your condition. If you experience any of warning signs to your health, call your doctor.               [facility]  Unless otherwise directed by your provider, please contact [facility], our nurse care line that is available for 24/7 assistance.     [phone] (toll-free)    Registered nurses in the [facility] provide clinical advisement, health education, appointment booking, and other advisory services.                    ** Verify the list of medication(s) below is accurate and up to date. Carry this  with you in case of emergency. If your medications have changed, please notify your healthcare provider.             Medication List      START taking these medications        Additional Info                      ferrous sulfate 325 (65 FE) MG EC tablet   Refills:  0   Dose:  325 mg    Last time this was given:  325 mg on 3/17/2017  9:43 AM   Instructions:  Take 1 tablet (325 mg total) by mouth once daily.     Begin Date    AM    Noon    PM    Bedtime       magnesium oxide 400 mg tablet   Commonly known as:  MAG-OX   Refills:  0   Dose:  400 mg    Last time this was given:  400 mg on 3/17/2017  9:43 AM   Instructions:  Take 1 tablet (400 mg total) by mouth once daily.     Begin Date    AM    Noon    PM    Bedtime       ondansetron 8 MG Tbdl   Commonly known as:  ZOFRAN-ODT   Refills:  0   Dose:  8 mg    Instructions:  Take 1 tablet (8 mg total) by mouth every 8 (eight) hours as needed.     Begin Date    AM    Noon    PM    Bedtime       polyethylene glycol 17 gram Pwpk   Commonly known as:  GLYCOLAX   Refills:  0   Dose:  17 g    Instructions:  Take 17 g by mouth 2 (two) times daily as needed (Constipation).     Begin Date    AM    Noon    PM    Bedtime         CHANGE how you take these medications        Additional Info                      acetaminophen 325 MG tablet   Commonly known as:  TYLENOL   Refills:  0   Dose:  650 mg   What changed:    - how much to take  - when to take this  - reasons to take this    Last time this was given:  650 mg on 3/17/2017  9:43 AM   Instructions:  Take 2 tablets (650 mg total) by mouth every 4 (four) hours as needed for Pain.     Begin Date    AM    Noon    PM    Bedtime       carvedilol 25 MG tablet   Commonly known as:  COREG   Quantity:  60 tablet   Refills:  11   Dose:  25 mg   What changed:  when to take this    Last time this was given:  25 mg on 3/17/2017  9:43 AM   Instructions:  Take 1 tablet (25 mg total) by mouth 2 (two) times daily with meals.     Begin Date    AM     Noon    PM    Bedtime         CONTINUE taking these medications        Additional Info                      carbamazepine 200 mg tablet   Commonly known as:  EPITOL   Quantity:  90 tablet   Refills:  2   Dose:  200 mg    Instructions:  Take 1 tablet (200 mg total) by mouth nightly.     Begin Date    AM    Noon    PM    Bedtime       levothyroxine 100 MCG tablet   Commonly known as:  SYNTHROID   Quantity:  90 tablet   Refills:  3   Dose:  100 mcg    Last time this was given:  100 mcg on 3/17/2017  6:35 AM   Instructions:  Take 1 tablet (100 mcg total) by mouth once daily.     Begin Date    AM    Noon    PM    Bedtime       lisinopril 40 MG tablet   Commonly known as:  PRINIVIL,ZESTRIL   Quantity:  90 tablet   Refills:  3   Dose:  40 mg    Last time this was given:  20 mg on 3/17/2017  9:43 AM   Instructions:  Take 1 tablet (40 mg total) by mouth once daily.     Begin Date    AM    Noon    PM    Bedtime       phenobarbital 64.8 MG tablet   Commonly known as:  LUMINAL   Quantity:  90 tablet   Refills:  3   Dose:  64.8 mg    Last time this was given:  64.8 mg on 3/16/2017  8:17 PM   Instructions:  Take 1 tablet (64.8 mg total) by mouth every evening.     Begin Date    AM    Noon    PM    Bedtime         STOP taking these medications     chlorthalidone 25 MG Tab   Commonly known as:  HYGROTEN       clopidogrel 75 mg tablet   Commonly known as:  PLAVIX            Where to Get Your Medications      You can get these medications from any pharmacy     You don't need a prescription for these medications     acetaminophen 325 MG tablet    ferrous sulfate 325 (65 FE) MG EC tablet    magnesium oxide 400 mg tablet         Information about where to get these medications is not yet available     ! Ask your nurse or doctor about these medications     ondansetron 8 MG Tbdl    polyethylene glycol 17 gram Pwpk                  Please bring to all follow up appointments:    1. A copy of your discharge instructions.  2. All medicines  you are currently taking in their original bottles.  3. Identification and insurance card.    Please arrive 15 minutes ahead of scheduled appointment time.    Please call 24 hours in advance if you must reschedule your appointment and/or time.        Your Scheduled Appointments     Mar 24, 2017 11:20 AM CDT   Established Patient Visit with MD Geno Glass - Neurology ([facility])    [address] [place] [address], [address]  [facility] [address]   [phone]            Apr 04, 2017  8:00 AM CDT   New Patient with MD [place] Martinez Jr. - Orthopedics ([place])    [address]  [facility] [address]   [phone]            Apr 07, 2017  8:00 AM CDT   Established Patient Visit with MD Geno Glass - Neurology ([facility])    [address] [facility], [address]  [facility] [address]   [phone]            Apr 18, 2017 10:00 AM CDT   Established Patient Visit with BOB Marie - Orthopedics ([place])    [address]  [place] [address]   [phone]              Follow-up Information     Follow up with Kevin Saravia Jr, MD On 4/4/2017.    Specialties:  Hand Surgery, Orthopedic Surgery    Why:  8:00am    Contact information:    [address]  [address]  [facility] [address]  [phone]          [facility] with [facility]    Specialty:  Rehabilitation    Why:  SNF    Contact information:    [place] [id number] [phone]          Discharge Instructions     Future Orders    Activity: Per rehab recommendations     CBC auto differential     Comments:    Weekly on Wednesday. Fax to Dr. Arredondo at [phone]    Diet Adult Regular     Questions:    Total calories:      Fat restriction, if any:      Protein restriction, if any:      Na restriction, if any:      Fluid restriction:      Additional restrictions:      Full code     Intake and output per facility protocol     Skin assessment every shift       Up in chair/ wheel chair     Vital signs per facility protocol     Weight bearing status:     Comments:    Non-weight bearing to RUE. Keep RUE in sling at all times.        Discharge Instructions       FRACTURE, SHOULDER (ENGLISH) View Edit Remove   ANEMIA (ENGLISH) View Edit Remove   BLOOD TRANSFUSION (ADULT), WHEN YOU NEED A (ENGLISH) View Edit Remove   IRON TABLETS, CAPSULES, EXTENDED-RELEASE TABLETS (ENGLISH) View Edit Remove   ONDANSETRON TABLETS (ENGLISH) View Edit Remove   POLYETHYLENE GLYCOL POWDER (ENGLISH) View Edit Remove   MAGNESIUM SALTS CAPSULES OR TABLETS, IMMEDIATE RELEASE (ENGLISH)                                                      Discharge References/Attachments     FRACTURE, SHOULDER (ENGLISH)    ANEMIA (ENGLISH)    BLOOD TRANSFUSION (ADULT), WHEN YOU NEED A (ENGLISH)    IRON TABLETS, CAPSULES, EXTENDED-RELEASE TABLETS (ENGLISH)    ONDANSETRON TABLETS (ENGLISH)    POLYETHYLENE GLYCOL POWDER (ENGLISH)    MAGNESIUM SALTS CAPSULES OR TABLETS, IMMEDIATE RELEASE (ENGLISH)        Primary Diagnosis     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      Admission Information     Date & Time Provider Department CSN    3/13/2017  6:45 PM Ryan Espinosa MD [facility] [id number]       Admisson Diagnosis: Thrombocytopenia, unspecified, Chronic hyponatremia, Hypomagnesemia, Pain, Renovascular hypertension, Bilateral renal artery stenosis, S/P exploratory laparotomy, Anemia due to antineoplastic chemotherapy, NSTEMI (non-ST elevated myocardial infarction), Degenerative joint disease of sacroiliac joint, Acquired hypothyroidism, DLBCL (diffuse large B cell lymphoma), Closed displaced fracture of distal phalanx of right little finger, initial encounter, Finger fracture, right, closed, initial encounter, Shoulder fracture, right, closed, initial encounter, Symptomatic anemia, Closed comminuted fracture of right humerus, initial encounter, 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, Osteoarthritis of lumbar spine, unspecified spinal osteoarthritis complication status      Care Providers     Provider Role Specialty Primary office phone    Ryan Espinosa MD Attending Provider Hospitalist [phone]    Kevin Saravia Jr., MD Consulting Physician  Hand Surgery [phone]    Ryan Espinosa MD Physician  Hospitalist  [phone]    Tyson Arredondo MD Consulting Physician  Hematology and Oncology [phone]      Your Vitals Were     BP Pulse Temp Resp Height Weight    142/63 64 98.2 °F (36.8 °C) 18 4' 9" (1.448 m) 63.5 kg (140 lb)    SpO2 BMI             94% 30.3 kg/m2         Recent Lab Values     No lab values to display.      Pending Labs     Order Current Status    Prepare RBC 2 Units; anemia Preliminary result      Allergies as of 3/17/2017        Reactions    Adhesive Itching, Blisters    Penicillins Anaphylaxis    Tramadol Hives    Avelox [Moxifloxacin] Rash    Facial and arm itching and redness. Pt states throat closes when given.    Amoxil [Amoxicillin]     Aspridrox [Aspirin, Buffered]     Codeine Other (See Comments)    Throat swelling    Keflex [Cephalexin]     Norvasc [Amlodipine]     Red Dye Hives    Sulfa (Sulfonamide Antibiotics)     Tylenol [Acetaminophen]     Has reaction to Tylenol with red dye and unable to take Extra Strength Tylenol/ CAN ONLY TOLERATE REG STRENGTH TYLENOL    Vicks Vaporub [Camphor-eucalyptus Oil-menthol]       Advance Directives     An advance directive is a document which, in the event you are no longer able to make decisions for yourself, tells your healthcare team what kind of treatment you do or do not want to receive, or who you would like to make those decisions for you.  If you do not currently have an advance directive, [facility] encourages you to create one.  For more information call:  [phone] ([phone]), or log on to [email or web address].        Blood Transfusion Reaction Signs and " Symptoms     The blood you have received has been matched for you as carefully as possible. Most patients who receive a blood transfusion do not experience problems. However, there can be a delayed reaction that happens a few weeks after your blood transfusion. Contact your physician immediately if you experience any NEW SYMPTOMS listed below:     Fever greater than 100.4 degrees    Chills   Yellow color to your skin or eyes(Jaundice)   Back pain, chest pain, or pain at the infusion site   Weakness (more than usual)   Discomfort or uneasiness more than usual (Malaise)   Nausea or vomiting   Shortness of breath, wheezing, or coughing   Higher or lower blood pressure than normal   Skin rash, itching, skin redness, or localized swelling (example: hands or feet)   Urinating less than normal   Urine appears reddish or orange and is darker than normal      Remember that some these signs may already exist for you--such as having chronic back pain or high blood pressure. You only need to look for and report to your doctor any new occurrences since your blood transfusion that are of concern.        Translation Services Information     ATTENTION: Language assistance services are available, free of charge. Please call [phone].    ATENCIÓN: Si habla ayalaañol, tiene a cox disposición servicios gratuitos de asistencia lingüística. Llame al [phone].     CHÚ Ý: N?u b?n nói Ti?ng Vi?t, có các d?ch v? h? tr? ngôn ng? mi?n phí dành cho b?n. G?i s? [phone].        Pneumonmia Discharge Instructions                 [facility] complies with applicable Federal civil rights laws and does not discriminate on the basis of race, color, national origin, age, disability, or sex.

## 2017-03-14 ENCOUNTER — TELEPHONE (OUTPATIENT)
Dept: CARDIOLOGY | Facility: CLINIC | Age: 82
End: 2017-03-14

## 2017-03-14 ENCOUNTER — TELEPHONE (OUTPATIENT)
Dept: HEMATOLOGY/ONCOLOGY | Facility: CLINIC | Age: 82
End: 2017-03-14

## 2017-03-14 PROBLEM — D69.6 THROMBOCYTOPENIA, UNSPECIFIED: Status: ACTIVE | Noted: 2017-03-14

## 2017-03-14 PROBLEM — S42.91XA SHOULDER FRACTURE, RIGHT: Status: ACTIVE | Noted: 2017-03-14

## 2017-03-14 PROBLEM — E83.42 HYPOMAGNESEMIA: Status: ACTIVE | Noted: 2017-03-14

## 2017-03-14 PROBLEM — I21.4 NSTEMI (NON-ST ELEVATED MYOCARDIAL INFARCTION): Status: ACTIVE | Noted: 2017-03-14

## 2017-03-14 PROBLEM — D64.9 SYMPTOMATIC ANEMIA: Status: ACTIVE | Noted: 2017-03-14

## 2017-03-14 PROBLEM — S62.636A CLOSED DISPLACED FRACTURE OF DISTAL PHALANX OF RIGHT LITTLE FINGER: Status: ACTIVE | Noted: 2017-03-14

## 2017-03-14 PROBLEM — S42.351A CLOSED COMMINUTED FRACTURE OF RIGHT HUMERUS: Status: ACTIVE | Noted: 2017-03-14

## 2017-03-14 LAB
ABO + RH BLD: NORMAL
ANION GAP SERPL CALC-SCNC: 8 MMOL/L
APTT BLDCRRT: 29.8 SEC
BASOPHILS # BLD AUTO: 0 K/UL
BASOPHILS # BLD AUTO: 0.01 K/UL
BASOPHILS NFR BLD: 0 %
BASOPHILS NFR BLD: 0.1 %
BLD GP AB SCN CELLS X3 SERPL QL: NORMAL
BLD PROD TYP BPU: NORMAL
BLD PROD TYP BPU: NORMAL
BLOOD UNIT EXPIRATION DATE: NORMAL
BLOOD UNIT EXPIRATION DATE: NORMAL
BLOOD UNIT TYPE CODE: 5100
BLOOD UNIT TYPE CODE: 5100
BLOOD UNIT TYPE: NORMAL
BLOOD UNIT TYPE: NORMAL
BUN SERPL-MCNC: 18 MG/DL
CALCIUM SERPL-MCNC: 8.4 MG/DL
CHLORIDE SERPL-SCNC: 92 MMOL/L
CK SERPL-CCNC: 89 U/L
CO2 SERPL-SCNC: 25 MMOL/L
CODING SYSTEM: NORMAL
CODING SYSTEM: NORMAL
CREAT SERPL-MCNC: 0.8 MG/DL
DIFFERENTIAL METHOD: ABNORMAL
DIFFERENTIAL METHOD: ABNORMAL
DISPENSE STATUS: NORMAL
DISPENSE STATUS: NORMAL
EOSINOPHIL # BLD AUTO: 0 K/UL
EOSINOPHIL # BLD AUTO: 0 K/UL
EOSINOPHIL NFR BLD: 0.2 %
EOSINOPHIL NFR BLD: 0.4 %
ERYTHROCYTE [DISTWIDTH] IN BLOOD BY AUTOMATED COUNT: 15.4 %
ERYTHROCYTE [DISTWIDTH] IN BLOOD BY AUTOMATED COUNT: 19 %
EST. GFR  (AFRICAN AMERICAN): >60 ML/MIN/1.73 M^2
EST. GFR  (NON AFRICAN AMERICAN): >60 ML/MIN/1.73 M^2
GLUCOSE SERPL-MCNC: 124 MG/DL
HCT VFR BLD AUTO: 20 %
HCT VFR BLD AUTO: 21.2 %
HGB BLD-MCNC: 6.7 G/DL
HGB BLD-MCNC: 7.5 G/DL
INR PPP: 1.1
LYMPHOCYTES # BLD AUTO: 0.3 K/UL
LYMPHOCYTES # BLD AUTO: 0.3 K/UL
LYMPHOCYTES NFR BLD: 4.2 %
LYMPHOCYTES NFR BLD: 5.4 %
MAGNESIUM SERPL-MCNC: 1.2 MG/DL
MAGNESIUM SERPL-MCNC: 1.2 MG/DL
MCH RBC QN AUTO: 34.6 PG
MCH RBC QN AUTO: 35.1 PG
MCHC RBC AUTO-ENTMCNC: 33.5 %
MCHC RBC AUTO-ENTMCNC: 35.4 %
MCV RBC AUTO: 105 FL
MCV RBC AUTO: 98 FL
MONOCYTES # BLD AUTO: 0.5 K/UL
MONOCYTES # BLD AUTO: 0.6 K/UL
MONOCYTES NFR BLD: 8.1 %
MONOCYTES NFR BLD: 8.1 %
NEUTROPHILS # BLD AUTO: 5.1 K/UL
NEUTROPHILS # BLD AUTO: 6.3 K/UL
NEUTROPHILS NFR BLD: 86.1 %
NEUTROPHILS NFR BLD: 87.2 %
PHOSPHATE SERPL-MCNC: 3.5 MG/DL
PLATELET # BLD AUTO: 82 K/UL
PLATELET # BLD AUTO: 86 K/UL
PMV BLD AUTO: 10.2 FL
PMV BLD AUTO: 11.4 FL
POTASSIUM SERPL-SCNC: 3.6 MMOL/L
PROTHROMBIN TIME: 11.6 SEC
RBC # BLD AUTO: 1.91 M/UL
RBC # BLD AUTO: 2.17 M/UL
SODIUM SERPL-SCNC: 125 MMOL/L
TRANS ERYTHROCYTES VOL PATIENT: NORMAL ML
TRANS ERYTHROCYTES VOL PATIENT: NORMAL ML
TROPONIN I SERPL DL<=0.01 NG/ML-MCNC: 0.07 NG/ML
TROPONIN I SERPL DL<=0.01 NG/ML-MCNC: 0.14 NG/ML
TROPONIN I SERPL DL<=0.01 NG/ML-MCNC: 0.15 NG/ML
WBC # BLD AUTO: 5.96 K/UL
WBC # BLD AUTO: 7.17 K/UL

## 2017-03-14 PROCEDURE — 93005 ELECTROCARDIOGRAM TRACING: CPT

## 2017-03-14 PROCEDURE — 83735 ASSAY OF MAGNESIUM: CPT

## 2017-03-14 PROCEDURE — 25000003 PHARM REV CODE 250: Performed by: EMERGENCY MEDICINE

## 2017-03-14 PROCEDURE — 85730 THROMBOPLASTIN TIME PARTIAL: CPT

## 2017-03-14 PROCEDURE — P9021 RED BLOOD CELLS UNIT: HCPCS

## 2017-03-14 PROCEDURE — 86900 BLOOD TYPING SEROLOGIC ABO: CPT

## 2017-03-14 PROCEDURE — 86901 BLOOD TYPING SEROLOGIC RH(D): CPT

## 2017-03-14 PROCEDURE — 85610 PROTHROMBIN TIME: CPT

## 2017-03-14 PROCEDURE — 11000001 HC ACUTE MED/SURG PRIVATE ROOM

## 2017-03-14 PROCEDURE — 85025 COMPLETE CBC W/AUTO DIFF WBC: CPT

## 2017-03-14 PROCEDURE — 99223 1ST HOSP IP/OBS HIGH 75: CPT | Mod: 57,,, | Performed by: ORTHOPAEDIC SURGERY

## 2017-03-14 PROCEDURE — 84484 ASSAY OF TROPONIN QUANT: CPT

## 2017-03-14 PROCEDURE — 25000003 PHARM REV CODE 250: Performed by: NURSE PRACTITIONER

## 2017-03-14 PROCEDURE — 36415 COLL VENOUS BLD VENIPUNCTURE: CPT

## 2017-03-14 PROCEDURE — 23665 CLTX SHO DSLC FX GR HMRL TBR: CPT | Mod: RT,,, | Performed by: ORTHOPAEDIC SURGERY

## 2017-03-14 PROCEDURE — 99223 1ST HOSP IP/OBS HIGH 75: CPT | Mod: ,,, | Performed by: INTERNAL MEDICINE

## 2017-03-14 PROCEDURE — 86920 COMPATIBILITY TEST SPIN: CPT

## 2017-03-14 PROCEDURE — 63600175 PHARM REV CODE 636 W HCPCS: Performed by: NURSE PRACTITIONER

## 2017-03-14 PROCEDURE — 80048 BASIC METABOLIC PNL TOTAL CA: CPT

## 2017-03-14 RX ORDER — RAMELTEON 8 MG/1
8 TABLET ORAL NIGHTLY PRN
Status: DISCONTINUED | OUTPATIENT
Start: 2017-03-14 | End: 2017-03-17 | Stop reason: HOSPADM

## 2017-03-14 RX ORDER — ACETAMINOPHEN 325 MG/1
650 TABLET ORAL EVERY 8 HOURS PRN
Status: DISCONTINUED | OUTPATIENT
Start: 2017-03-14 | End: 2017-03-16

## 2017-03-14 RX ORDER — ONDANSETRON 8 MG/1
8 TABLET, ORALLY DISINTEGRATING ORAL EVERY 8 HOURS PRN
Status: DISCONTINUED | OUTPATIENT
Start: 2017-03-14 | End: 2017-03-17 | Stop reason: HOSPADM

## 2017-03-14 RX ORDER — CARVEDILOL 25 MG/1
25 TABLET ORAL 2 TIMES DAILY
Status: DISCONTINUED | OUTPATIENT
Start: 2017-03-14 | End: 2017-03-17 | Stop reason: HOSPADM

## 2017-03-14 RX ORDER — CHLORTHALIDONE 25 MG/1
25 TABLET ORAL DAILY
Status: ON HOLD | COMMUNITY
End: 2017-03-17 | Stop reason: HOSPADM

## 2017-03-14 RX ORDER — SODIUM CHLORIDE 9 MG/ML
INJECTION, SOLUTION INTRAVENOUS CONTINUOUS
Status: DISCONTINUED | OUTPATIENT
Start: 2017-03-14 | End: 2017-03-15

## 2017-03-14 RX ORDER — LEVOTHYROXINE SODIUM 100 UG/1
100 TABLET ORAL
Status: DISCONTINUED | OUTPATIENT
Start: 2017-03-14 | End: 2017-03-17 | Stop reason: HOSPADM

## 2017-03-14 RX ORDER — CLOPIDOGREL BISULFATE 75 MG/1
75 TABLET ORAL DAILY
Status: DISCONTINUED | OUTPATIENT
Start: 2017-03-14 | End: 2017-03-14

## 2017-03-14 RX ORDER — PHENOBARBITAL 32.4 MG/1
64.8 TABLET ORAL NIGHTLY
Status: DISCONTINUED | OUTPATIENT
Start: 2017-03-14 | End: 2017-03-17 | Stop reason: HOSPADM

## 2017-03-14 RX ORDER — CARBAMAZEPINE 100 MG/1
200 TABLET, CHEWABLE ORAL NIGHTLY
Status: DISCONTINUED | OUTPATIENT
Start: 2017-03-14 | End: 2017-03-17 | Stop reason: HOSPADM

## 2017-03-14 RX ORDER — HYDROCODONE BITARTRATE AND ACETAMINOPHEN 500; 5 MG/1; MG/1
TABLET ORAL
Status: DISCONTINUED | OUTPATIENT
Start: 2017-03-14 | End: 2017-03-17 | Stop reason: HOSPADM

## 2017-03-14 RX ADMIN — LEVOTHYROXINE SODIUM 100 MCG: 100 TABLET ORAL at 07:03

## 2017-03-14 RX ADMIN — CLOPIDOGREL BISULFATE 75 MG: 75 TABLET ORAL at 09:03

## 2017-03-14 RX ADMIN — SODIUM CHLORIDE: 0.9 INJECTION, SOLUTION INTRAVENOUS at 04:03

## 2017-03-14 RX ADMIN — CARBAMAZEPINE 200 MG: 100 TABLET, CHEWABLE ORAL at 09:03

## 2017-03-14 RX ADMIN — ACETAMINOPHEN 650 MG: 325 TABLET ORAL at 11:03

## 2017-03-14 RX ADMIN — ACETAMINOPHEN 650 MG: 325 TABLET ORAL at 09:03

## 2017-03-14 RX ADMIN — CARVEDILOL 25 MG: 25 TABLET, FILM COATED ORAL at 09:03

## 2017-03-14 RX ADMIN — PHENOBARBITAL 64.8 MG: 32.4 TABLET ORAL at 09:03

## 2017-03-14 RX ADMIN — MAGNESIUM SULFATE HEPTAHYDRATE 3 G: 500 INJECTION, SOLUTION INTRAMUSCULAR; INTRAVENOUS at 04:03

## 2017-03-14 RX ADMIN — ACETAMINOPHEN 650 MG: 325 TABLET ORAL at 04:03

## 2017-03-14 NOTE — ASSESSMENT & PLAN NOTE
Currently with BP 90/42.  Will hold lisinopril and carvedilol. Give IVFs.  Continue to monitor.  Resume medications when appropriate.

## 2017-03-14 NOTE — ASSESSMENT & PLAN NOTE
Troponin peaked at 0.153.  Allergy to Aspirin.  Holding plavix because of the thrombocytopenis. Holding carvedilol and lisinopril due to hypotension.  Monitor on telemetry.  Appreciate cardiology assistance.

## 2017-03-14 NOTE — TELEPHONE ENCOUNTER
Dr. Arredondo was given consult info.    ----- Message from Christina Booth sent at 3/14/2017 10:49 AM CDT -----  Contact: consult  KURT APONTE [[id number]]   1935  # emergency room holding area bed 1  Ref dr webb  Reason anemia    Contact nurse bhavin [phone]

## 2017-03-14 NOTE — TELEPHONE ENCOUNTER
Patients daughter called to inform that her mother had fractures and was advised to consult cardiology. Patient was instructed to hold carvedilol due to hypotension. Please advise.

## 2017-03-14 NOTE — ASSESSMENT & PLAN NOTE
S/p fall.  Orthopedic surgeon Dr. Kevin Saravia consulted by ED physician; appreciate assistance.  PT/OT evaluations after patient evaluated by Ortho.  PRN pain medication.

## 2017-03-14 NOTE — SUBJECTIVE & OBJECTIVE
Past Medical History:   Diagnosis Date    Cancer     colon    Hypertension     Petit mal epilepsy 1954    Scoliosis of lumbar spine     Seizures     Unspecified hypothyroidism        Past Surgical History:   Procedure Laterality Date    APPENDECTOMY      BACK SURGERY  1988    vertebral fracture    BACK SURGERY  02/2013    lumbar L2-5    CATARACT EXTRACTION, BILATERAL Bilateral     CHOLECYSTECTOMY      open    COLON SURGERY      EYE SURGERY Bilateral     cataract removal with lens implant    HYSTERECTOMY      PORTACATH PLACEMENT Right 09/2016    TONSILLECTOMY      VAGINAL HYSTERECTOMY W/ ANTERIOR AND POSTERIOR VAGINAL REPAIR         Review of patient's allergies indicates:   Allergen Reactions    Adhesive Itching and Blisters    Penicillins Anaphylaxis    Tramadol Hives    Avelox [moxifloxacin] Rash     Facial and arm itching and redness. Pt states throat closes when given.    Amoxil [amoxicillin]     Aspridrox [aspirin, buffered]     Codeine Other (See Comments)     Throat swelling    Keflex [cephalexin]     Norvasc [amlodipine]     Red dye Hives    Sulfa (sulfonamide antibiotics)     Tylenol [acetaminophen]      Has reaction to Tylenol with red dye and unable to take Extra Strength Tylenol/ CAN ONLY TOLERATE REG STRENGTH TYLENOL    Vicks vaporub [camphor-eucalyptus oil-menthol]        No current facility-administered medications on file prior to encounter.      Current Outpatient Prescriptions on File Prior to Encounter   Medication Sig    acetaminophen (TYLENOL) 325 MG tablet Take 325 mg by mouth every 4 to 6 hours as needed.     carbamazepine (EPITOL) 200 mg tablet Take 1 tablet (200 mg total) by mouth nightly.    carvedilol (COREG) 25 MG tablet Take 1 tablet (25 mg total) by mouth 2 (two) times daily with meals. (Patient taking differently: Take 25 mg by mouth 2 (two) times daily. )    clopidogrel (PLAVIX) 75 mg tablet Take 1 tablet (75 mg total) by mouth once daily. (Patient  taking differently: Take 75 mg by mouth once daily. With lunch)    levothyroxine (SYNTHROID) 100 MCG tablet Take 1 tablet (100 mcg total) by mouth once daily.    lisinopril (PRINIVIL,ZESTRIL) 40 MG tablet Take 1 tablet (40 mg total) by mouth once daily.    phenobarbital (LUMINAL) 64.8 MG tablet Take 1 tablet (64.8 mg total) by mouth every evening.    [DISCONTINUED] chlorthalidone (HYGROTEN) 25 MG Tab Take 25 mg by mouth once daily.     Family History     Problem Relation (Age of Onset)    Heart attack Father    Hypertension Father    Pancreatic cancer Mother        Social History Main Topics    Smoking status: Never Smoker    Smokeless tobacco: Not on file    Alcohol use No    Drug use: No    Sexual activity: Not on file     Review of Systems   Constitution: Positive for weakness. Negative for fever and malaise/fatigue.   Cardiovascular: Negative for chest pain, claudication, cyanosis, dyspnea on exertion, irregular heartbeat, leg swelling, near-syncope, orthopnea, palpitations, paroxysmal nocturnal dyspnea and syncope.   Respiratory: Negative for cough, shortness of breath and wheezing.    Gastrointestinal: Negative for abdominal pain, constipation, diarrhea, melena, nausea and vomiting.   Neurological: Positive for dizziness.     Objective:     Vital Signs (Most Recent):  Temp: 98.4 °F (36.9 °C) (03/14/17 1526)  Pulse: 73 (03/14/17 1526)  Resp: 18 (03/14/17 1526)  BP: (!) 152/67 (03/14/17 1526)  SpO2: 99 % (03/14/17 1333) Vital Signs (24h Range):  Temp:  [97.8 °F (36.6 °C)-98.4 °F (36.9 °C)] 98.4 °F (36.9 °C)  Pulse:  [60-98] 73  Resp:  [16-20] 18  SpO2:  [94 %-99 %] 99 %  BP: ()/(42-76) 152/67     Weight: 63.5 kg (140 lb)  Body mass index is 30.3 kg/(m^2).    SpO2: 99 %  O2 Device (Oxygen Therapy): room air      Intake/Output Summary (Last 24 hours) at 03/14/17 1609  Last data filed at 03/14/17 0987   Gross per 24 hour   Intake              350 ml   Output              500 ml   Net             -150  ml       Lines/Drains/Airways     Central Venous Catheter Line                 Port A Cath Single Lumen 03/14/17 0300 left subclavian less than 1 day                Physical Exam   Constitutional: She is oriented to person, place, and time. She appears well-developed and well-nourished. No distress.   Cardiovascular: Normal rate and regular rhythm.    No murmur heard.  Pulmonary/Chest: Breath sounds normal. No respiratory distress. She has no wheezes.   Abdominal: Soft. Bowel sounds are normal. She exhibits no distension. There is no tenderness.   Neurological: She is alert and oriented to person, place, and time.   Skin: Skin is warm and dry.       Significant Labs:       Recent Labs  Lab 03/14/17  0454   WBC 5.96   RBC 2.17*   HGB 7.5*   HCT 21.2*   PLT 86*   MCV 98   MCH 34.6*   MCHC 35.4       Recent Labs  Lab 03/13/17 2315 03/14/17  0454   CALCIUM 8.3* 8.4*   PROT 5.4*  --    * 125*   K 3.6 3.6   CO2 26 25   CL 94* 92*   BUN 16 18   CREATININE 0.7 0.8   ALKPHOS 59  --    ALT 12  --    AST 17  --    BILITOT 0.4  --        Recent Labs  Lab 03/13/17 2315 03/14/17  1143   CPK 89  --   --    TROPONINI  --   < > 0.067*   < > = values in this interval not displayed.    Significant Imaging: Echocardiogram:   2D echo with color flow doppler:   Results for orders placed or performed during the hospital encounter of 09/16/16   2D echo with color flow doppler   Result Value Ref Range    EF 60 55 - 65    Diastolic Dysfunction No     Est. PA Systolic Pressure 17.9

## 2017-03-14 NOTE — ED PROVIDER NOTES
Encounter Date: 3/13/2017       History     Chief Complaint   Patient presents with    Fall     pt arrived per [facility] c/o right shoulder pain after fall at 330 pm today     Review of patient's allergies indicates:   Allergen Reactions    Adhesive Itching and Blisters    Penicillins Anaphylaxis    Tramadol Hives    Avelox [moxifloxacin] Rash     Facial and arm itching and redness. Pt states throat closes when given.    Amoxil [amoxicillin]     Aspridrox [aspirin, buffered]     Codeine Other (See Comments)     Throat swelling    Keflex [cephalexin]     Norvasc [amlodipine]     Red dye Hives    Sulfa (sulfonamide antibiotics)     Tylenol [acetaminophen]      Has reaction to Tylenol with red dye and unable to take Extra Strength Tylenol/ CAN ONLY TOLERATE REG STRENGTH TYLENOL     HPI Comments: Pt with Right shoulder pain after a fall. She was walking and got weak and lightheaded and tried to stop her fall with door but was not able to.  Hit head on ground and right shoulder.  No numbness or paresthesias.  No cp or sob,  No able to get up afterwards.  No fever, chills, nausea or vomiting. Pain worse with movement and better with rest.  +headache and weak   Pt in remission from Hodgkins Lymphoma.  Last chemo in Jan 2017    The history is provided by the patient and a relative.     Past Medical History:   Diagnosis Date    Cancer     colon    Hypertension     Petit mal epilepsy 1954    Scoliosis of lumbar spine     Seizures     Unspecified hypothyroidism      Past Surgical History:   Procedure Laterality Date    APPENDECTOMY      BACK SURGERY  1988    vertebral fracture    BACK SURGERY  02/2013    lumbar L2-5    CATARACT EXTRACTION, BILATERAL Bilateral     CHOLECYSTECTOMY      open    COLON SURGERY      EYE SURGERY Bilateral     cataract removal with lens implant    HYSTERECTOMY      PORTACATH PLACEMENT Right 09/2016    TONSILLECTOMY      VAGINAL HYSTERECTOMY W/ ANTERIOR AND POSTERIOR  VAGINAL REPAIR       Family History   Problem Relation Age of Onset    Pancreatic cancer Mother     Hypertension Father     Heart attack Father      Social History   Substance Use Topics    Smoking status: Never Smoker    Smokeless tobacco: None    Alcohol use No     Review of Systems   Constitutional: Positive for fatigue.   Eyes: Negative.    Respiratory: Negative.  Negative for cough, chest tightness, shortness of breath and wheezing.    Cardiovascular: Negative.    Endocrine: Negative.    Genitourinary: Negative.    Musculoskeletal:        Right shoulder pain and right 5 th digit pain    Skin:        Laceration to 5th digit distally on volar side    Allergic/Immunologic: Negative.    Neurological: Positive for weakness and headaches. Negative for syncope, speech difficulty and numbness.   Hematological: Negative.    Psychiatric/Behavioral: Negative.        Physical Exam   Initial Vitals   BP Pulse Resp Temp SpO2   03/13/17 1846 03/13/17 1846 03/13/17 1846 -- 03/13/17 1846   167/76 98 20  98 %     Physical Exam    Nursing note and vitals reviewed.  Constitutional: She appears well-developed and well-nourished.   HENT:   Head: Normocephalic.   Contusion to left forehead    Eyes: Conjunctivae and EOM are normal.   Neck: Normal range of motion. Neck supple.   Cardiovascular: Normal rate, regular rhythm, normal heart sounds and intact distal pulses.   Pulmonary/Chest: Breath sounds normal.   Abdominal: Soft. Bowel sounds are normal.   Musculoskeletal:   No C spine tenderness or pain with movement.  No chest wall pain with palpation, No TTP of T or L spine, or pelvis, FROM of hip, knee and ankles.  No TTP of left arm  Pt has tenderness and pain to right shoulder region.  Good sensation to deltoid region but cannot flex or extend and abduct arm.  Mild tenderness with no swelling to elbow.  Right 5 digit with V shaped laceration over distal phalaynx - superficial with no tendon exposure.  Pt will not flex or ext  mcp, PCP or DIP of 5th digit on right hand.  No TTP of rest of hand. CR <2 sec. RP 2+, able to flex and extend wrist with no TTP    Neurological: She is alert and oriented to person, place, and time. She has normal strength.   Skin: Skin is warm and dry.   Psychiatric: She has a normal mood and affect.         ED Course   Lac Repair  Date/Time: 3/14/2017 1:05 AM  Performed by: SERENITY COPE  Authorized by: SERENITY COPE   Consent Done: Not Needed  Location: right 5th digit.  Laceration length: 2 cm  Contaminated: not contaminated   Foreign bodies: no foreign bodies  Tendon involvement: none  Nerve involvement: none  Vascular damage: no  Anesthesia: nerve block    Anesthesia:  Anesthesia: nerve block  Local Anesthetic: lidocaine 1% without epinephrine   Anesthetic total: 4 mL  Patient sedated: no  Preparation: Patient was prepped and draped in the usual sterile fashion.  Irrigation solution: saline  Irrigation method: syringe  Amount of cleaning: standard  Debridement: none  Degree of undermining: none  Skin closure: 4-0 nylon  Number of sutures: 5  Technique: simple  Approximation: close  Approximation difficulty: simple  Dressing: 4x4 sterile gauze  Patient tolerance: Patient tolerated the procedure well with no immediate complications        Labs Reviewed   CBC W/ AUTO DIFFERENTIAL - Abnormal; Notable for the following:        Result Value    RBC 1.91 (*)     Hemoglobin 6.7 (*)     Hematocrit 20.0 (*)      (*)     MCH 35.1 (*)     RDW 15.4 (*)     All other components within normal limits   COMPREHENSIVE METABOLIC PANEL - Abnormal; Notable for the following:     Sodium 127 (*)     Chloride 94 (*)     Glucose 129 (*)     Calcium 8.3 (*)     Total Protein 5.4 (*)     Albumin 3.3 (*)     Anion Gap 7 (*)     All other components within normal limits   TYPE & SCREEN   PREPARE RBC SOFT     EKG Readings: (Independently Interpreted)   Initial Reading: No STEMI. Rhythm: Normal Sinus Rhythm. Heart Rate: 74.  Axis: Normal.     Imaging Results         CT Head Without Contrast (Final result) Result time:  03/13/17 21:49:18    Final result by Lamar Rojas MD (03/13/17 21:49:18)    Impression:        No acute intracranial abnormalities.    Stable senescent findings and either prominent perivascular space or remote infarct within the right subinsular region.            Electronically signed by: LAMAR ROJAS MD  Date:     03/13/17  Time:    21:49     Narrative:    Comparison: None    Clinical history: Fall    Technique:    Axial images of the brain were obtained at 5-mm intervals from the skull base to the vertex without the administration of contrast.    Findings:    There is generalized cerebral volume loss.  There is hypoattenuation in a periventricular fashion, likely sequela of chronic microvascular ischemic change.There is a focus of hypoattenuation within the right subinsular region, unchanged since previous PET CT suggesting either prominent perivascular space or remote infarct.  There is no evidence of acute major vascular territory infarct, hemorrhage, or mass.  There is no hydrocephalus.  There are no abnormal extra-axial fluid collections.  The paranasal sinuses and mastoid air cells are clear, and there is no evidence of calvarial fracture.  The visualized soft tissues are unremarkable.            X-Ray Chest AP Portable (Final result) Result time:  03/13/17 21:34:29    Final result by Zheng Duran MD (03/13/17 21:34:29)    Impression:        1.  No acute cardiopulmonary process.    2.  Comminuted fracture of the right humerus with intra-articular extension.      Electronically signed by: ZHENG DURAN MD  Date:     03/13/17  Time:    21:34     Narrative:    Chest pain    Comparison: 1/26/17.    Single frontal view of the chest was obtained.    There is a single-lumen right IJ Port-A-Cath the tip projecting over proximal SVC.  Trach is patent.  Cardiac silhouette is prominent.  There is atherosclerosis.   The lungs are well-expanded and appear clear.  No pneumothorax or free air below the diaphragm.  There is no definite evidence for displaced fracture of the ribs there is a comminuted fracture of the right humerus with intra-articular extension which is new from prior.            X-Ray Shoulder Trauma Right (Final result) Result time:  03/13/17 21:37:16    Final result by Zheng Duran MD (03/13/17 21:37:16)    Impression:     Fracture through the greater tuberosity of the humerus with large effusion and anterior/inferior displacement of the humeral head.  Further evaluation is clinically warranted.      Electronically signed by: ZHENG DURAN MD  Date:     03/13/17  Time:    21:37     Narrative:    Trauma.  Pain.    Comparison: None.    3 views of the right shoulder were obtained.    There is an acute fracture through the greater tuberosity of the humerus with possible intra-articular extension.  There is evidence for large dense effusion, likely posttraumatic with anterior displacement of the humerus.  Calcific tendinitis is evident.  A.c. joint arthrosis is present.  Sinus soft tissues are otherwise unremarkable.            X-Ray Elbow Complete Right (Final result) Result time:  03/13/17 21:35:44    Final result by Zheng Duran MD (03/13/17 21:35:44)    Impression:     No definite evidence for fracture.      Electronically signed by: ZHENG DURAN MD  Date:     03/13/17  Time:    21:35     Narrative:    Fall.  Pain.    Comparison: None.    3 views of the right elbow were obtained.    No definite areas for acute fracture, dislocation or destructive process.  Fat pads are not elevated paraspinous soft tissues are unremarkable.  Marginal osteophytes noted.  No radiopaque retained foreign body.            X-Ray Hand 3 view Right (Final result) Result time:  03/13/17 21:39:48    Final result by Zheng Duran MD (03/13/17 21:39:48)    Impression:        Minimally displaced fracture at the base of the distal  phalanx of the fifth digit as above.      Electronically signed by: SADAF MIRANDA MD  Date:     03/13/17  Time:    21:39     Narrative:    Trauma.  Pain.    Comparison: None    3 views of the right hand were obtained.    Bandages are noted over the fourth and fifth digits which limit evaluation.  There is evidence for an acute minimally displaced fractures through the base of the distal phalanx of the fifth digit.  No other fractures are visualized.  There is a fused disc space narrowing.  There is soft tissue swelling of the fifth digit.  There are no radiopaque retained foreign bodies.                 Medical Decision Making:   Initial Assessment:   [facility] will admit pt for transfusion.  Dr. Manjit Angulo is aware of pt                    ED Course     Clinical Impression:   The primary encounter diagnosis was Shoulder fracture, right, closed, initial encounter. Diagnoses of Pain, Symptomatic anemia, and Finger fracture, right, closed, initial encounter were also pertinent to this visit.    Disposition:   Disposition: Admitted  Condition: Stable       Stefania Farzier Do, MD  03/14/17 0133

## 2017-03-14 NOTE — CONSULTS
[facility]  Cardiology  Consult Note    Patient Name: Annette Garcia  MRN: [id number]  Admission Date: 3/13/2017  Hospital Length of Stay: 0 days  Code Status: Full Code   Attending Provider: Ryan Espinosa MD   Consulting Provider: SERGEY Medina ANP  Primary Care Physician: Jose Valles MD  Principal Problem:Symptomatic anemia    Patient information was obtained from patient and caregiver / friend.     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 Ms. Garcia complains of intermittent weakness and dizziness for the past few days. She describes it as drained feeling but it was not limiting her physically. She denies any chest pain or SOB. Yesterday she awoke feeling well and was doing laundry. She went into her garage to get clothes from the dryer and felt weak and dizzy. The dizziness was again described as a drained feeling. She felt as if she was going to pass out and turned to walk back into her house. She fell forward and hit her doorframe and broke her fall with her arms outstretched. She denies any LOC and reports she was unable to get up due to intense pain. This happened about 3:30pm and her daughter came to check on her about 5pm and found her on the floor therefore she was brought to the ER for evaluation. She was admitted to [facility] for symptomatic anemia    Past Medical History:   Diagnosis Date    Cancer     colon    Hypertension     Petit mal epilepsy 1954    Scoliosis of  lumbar spine     Seizures     Unspecified hypothyroidism        Past Surgical History:   Procedure Laterality Date    APPENDECTOMY      BACK SURGERY  1988    vertebral fracture    BACK SURGERY  02/2013    lumbar L2-5    CATARACT EXTRACTION, BILATERAL Bilateral     CHOLECYSTECTOMY      open    COLON SURGERY      EYE SURGERY Bilateral     cataract removal with lens implant    HYSTERECTOMY      PORTACATH PLACEMENT Right 09/2016    TONSILLECTOMY      VAGINAL HYSTERECTOMY W/ ANTERIOR AND POSTERIOR VAGINAL REPAIR         Review of patient's allergies indicates:   Allergen Reactions    Adhesive Itching and Blisters    Penicillins Anaphylaxis    Tramadol Hives    Avelox [moxifloxacin] Rash     Facial and arm itching and redness. Pt states throat closes when given.    Amoxil [amoxicillin]     Aspridrox [aspirin, buffered]     Codeine Other (See Comments)     Throat swelling    Keflex [cephalexin]     Norvasc [amlodipine]     Red dye Hives    Sulfa (sulfonamide antibiotics)     Tylenol [acetaminophen]      Has reaction to Tylenol with red dye and unable to take Extra Strength Tylenol/ CAN ONLY TOLERATE REG STRENGTH TYLENOL    Vicks vaporub [camphor-eucalyptus oil-menthol]        No current facility-administered medications on file prior to encounter.      Current Outpatient Prescriptions on File Prior to Encounter   Medication Sig    acetaminophen (TYLENOL) 325 MG tablet Take 325 mg by mouth every 4 to 6 hours as needed.     carbamazepine (EPITOL) 200 mg tablet Take 1 tablet (200 mg total) by mouth nightly.    carvedilol (COREG) 25 MG tablet Take 1 tablet (25 mg total) by mouth 2 (two) times daily with meals. (Patient taking differently: Take 25 mg by mouth 2 (two) times daily. )    clopidogrel (PLAVIX) 75 mg tablet Take 1 tablet (75 mg total) by mouth once daily. (Patient taking differently: Take 75 mg by mouth once daily. With lunch)    levothyroxine (SYNTHROID) 100 MCG tablet Take 1 tablet  (100 mcg total) by mouth once daily.    lisinopril (PRINIVIL,ZESTRIL) 40 MG tablet Take 1 tablet (40 mg total) by mouth once daily.    phenobarbital (LUMINAL) 64.8 MG tablet Take 1 tablet (64.8 mg total) by mouth every evening.    [DISCONTINUED] chlorthalidone (HYGROTEN) 25 MG Tab Take 25 mg by mouth once daily.     Family History     Problem Relation (Age of Onset)    Heart attack Father    Hypertension Father    Pancreatic cancer Mother        Social History Main Topics    Smoking status: Never Smoker    Smokeless tobacco: Not on file    Alcohol use No    Drug use: No    Sexual activity: Not on file     Review of Systems   Constitution: Positive for weakness. Negative for fever and malaise/fatigue.   Cardiovascular: Negative for chest pain, claudication, cyanosis, dyspnea on exertion, irregular heartbeat, leg swelling, near-syncope, orthopnea, palpitations, paroxysmal nocturnal dyspnea and syncope.   Respiratory: Negative for cough, shortness of breath and wheezing.    Gastrointestinal: Negative for abdominal pain, constipation, diarrhea, melena, nausea and vomiting.   Neurological: Positive for dizziness.     Objective:     Vital Signs (Most Recent):  Temp: 98.4 °F (36.9 °C) (03/14/17 1526)  Pulse: 73 (03/14/17 1526)  Resp: 18 (03/14/17 1526)  BP: (!) 152/67 (03/14/17 1526)  SpO2: 99 % (03/14/17 1333) Vital Signs (24h Range):  Temp:  [97.8 °F (36.6 °C)-98.4 °F (36.9 °C)] 98.4 °F (36.9 °C)  Pulse:  [60-98] 73  Resp:  [16-20] 18  SpO2:  [94 %-99 %] 99 %  BP: ()/(42-76) 152/67     Weight: 63.5 kg (140 lb)  Body mass index is 30.3 kg/(m^2).    SpO2: 99 %  O2 Device (Oxygen Therapy): room air      Intake/Output Summary (Last 24 hours) at 03/14/17 1609  Last data filed at 03/14/17 0949   Gross per 24 hour   Intake              350 ml   Output              500 ml   Net             -150 ml       Lines/Drains/Airways     Central Venous Catheter Line                 Port A Cath Single Lumen 03/14/17 0300 left  subclavian less than 1 day                Physical Exam   Constitutional: She is oriented to person, place, and time. She appears well-developed and well-nourished. No distress.   Cardiovascular: Normal rate and regular rhythm.    No murmur heard.  Pulmonary/Chest: Breath sounds normal. No respiratory distress. She has no wheezes.   Abdominal: Soft. Bowel sounds are normal. She exhibits no distension. There is no tenderness.   Neurological: She is alert and oriented to person, place, and time.   Skin: Skin is warm and dry.       Significant Labs:       Recent Labs  Lab 03/14/17  0454   WBC 5.96   RBC 2.17*   HGB 7.5*   HCT 21.2*   PLT 86*   MCV 98   MCH 34.6*   MCHC 35.4       Recent Labs  Lab 03/13/17 2315 03/14/17  0454   CALCIUM 8.3* 8.4*   PROT 5.4*  --    * 125*   K 3.6 3.6   CO2 26 25   CL 94* 92*   BUN 16 18   CREATININE 0.7 0.8   ALKPHOS 59  --    ALT 12  --    AST 17  --    BILITOT 0.4  --        Recent Labs  Lab 03/13/17 2315 03/14/17  1143   CPK 89  --   --    TROPONINI  --   < > 0.067*   < > = values in this interval not displayed.    Significant Imaging: Echocardiogram:   2D echo with color flow doppler:   Results for orders placed or performed during the hospital encounter of 09/16/16   2D echo with color flow doppler   Result Value Ref Range    EF 60 55 - 65    Diastolic Dysfunction No     Est. PA Systolic Pressure 17.9      Assessment and Plan:     * Symptomatic anemia  H&H 6.7&20.1 upon admission; chronic anemia due to chemo treatment; baseline H&H 7-10/24-31; received PRBC transfusion; H&H slightly improved this AM; denies any melena, hematemesis or hematochezia; ?etiology of worsening anemia-GI etiology vs chemo induced; further recs per primary team; okay with discontinuation of Plavix if blood loss suspected     Renovascular hypertension  Renal angiogram last week with patent right renal artery and severe ostial left renal artery stenosis-no intervention done; plans for intervention via  different approach at later date; BP stable currently with hypotension overnight; ACEI, BB and Chlorthalidone on hold for now and agree with this; will monitor BP and slowly resume medications; okay to hold Plavix due to anemia and since no recent intervention performed     Closed comminuted fracture of right humerus  Orthopedics consulted with nonsurgical management planned     NSTEMI (non-ST elevated myocardial infarction)  Initial troponin .145 with trend down to .067; EKG with no acute changes; likely related to demand etiology in setting of anemia and hypotension; recent echo with normal LVEF; no concern for ACS currently; since demand etiology suspected no need for IV Heparin and will avoid AC in the event GIB a concern; will recheck EKG and CE in AM; no need for repeat echocardiogram       VTE Risk Mitigation         Ordered     High Risk of VTE  Once      03/14/17 0253     Place PATRICIO hose  Until discontinued      03/14/17 0253     Place sequential compression device  Until discontinued      03/14/17 0253          Thank you for your consult. I will follow-up with patient. Please contact us if you have any additional questions.    SERGEY Medina, ANP  Cardiology   [facility]

## 2017-03-14 NOTE — ASSESSMENT & PLAN NOTE
H&H 6.7&20.1 upon admission; chronic anemia due to chemo treatment; baseline H&H 7-10/24-31; received PRBC transfusion; H&H slightly improved this AM; denies any melena, hematemesis or hematochezia; ?etiology of worsening anemia-GI etiology vs chemo induced; further recs per primary team; okay with discontinuation of Plavix if blood loss suspected

## 2017-03-14 NOTE — TELEPHONE ENCOUNTER
----- Message from Allie Blancas sent at 3/14/2017  8:04 AM CDT -----  Contact: daughter/[phone]  Pt is in the hospital /yoon right shoulder/[facility] Location/2nd pt of blood being given /[facility]/. Information given by yong Collier./Please advise.

## 2017-03-14 NOTE — ED NOTES
Assisted pt onto bed pan. Pt provided with call bell and instructed to press call button when she is finished.

## 2017-03-14 NOTE — ED NOTES
Wound care to laceration on pt's right pinky and ring fingers, as well as abrasion to pt's right forehead. Bleeding under control and bandages applied. Laceration to right pinky finger measures approximately 2 cm in length, across finger. Laceration to right ring finger measures approximately 1 cm in length.

## 2017-03-14 NOTE — ASSESSMENT & PLAN NOTE
Chronic.  Plaelet count 82K on admission, down from 106K on 3/8/17.  Continue to monitor.  Avoid heparin products.

## 2017-03-14 NOTE — ED NOTES
[facility] called about pt consult,spoke with Eleni Saravia called about Ortho consult  Consult for Dr Arredondo called in to [place]

## 2017-03-14 NOTE — ASSESSMENT & PLAN NOTE
Anemia due to antineoplastic chemotherapy  Diffuse large B cell lymphoma  Last chemotherapy on 1/17/17.  Patient recently required transfusion of 2 units pRBCs during 1/26/17-1/27/17 hospitalization.  Hgb/Hct 6.7/20 (down from 8.9/25.9 on 3/8/17).  Will transfuse 2 units pRBCs.  Daily CBC.

## 2017-03-14 NOTE — ED NOTES
Lab called about results for troponin lab that was sent over by previous shift.No results given at this time.

## 2017-03-14 NOTE — PLAN OF CARE
Problem: Patient Care Overview  Goal: Plan of Care Review  Outcome: Ongoing (interventions implemented as appropriate)  Reviewed plan of care with patient and daughter. Patient verbalized understanding. Fracture to right shoulder - arm in sling. Ice pack applied to right shoulder per orders. Dressing to 4th and 5th fingers of right hand CDI. IV fluids infusing continuously; NS at 125cc/hr. Pt on regular diet. Neuro checks Q4H. Pt able to ambulate to bedside commode w/ 1 person assist. Patient on continuous tele monitoring; NSR; HR 70s. No true red alarms or ectopy noted. Bed alarm set, bed in lowest position, call bell in reach. Will continue to monitor.

## 2017-03-14 NOTE — H&P
[facility] Hospital Medicine  History & Physical    Patient Name: Annette Garcia  MRN: [id number]  Admission Date: 3/13/2017  Attending Physician: Ryan Espinosa MD  Primary Care Provider: Jose Valles MD         Patient information was obtained from patient, relative(s), past medical records and ER records.     Subjective:     Principal Problem:Symptomatic anemia    Chief Complaint:   Chief Complaint   Patient presents with    Fall     pt arrived per [facility] c/o right shoulder pain after fall at 330 pm today        HPI: Annette Garcia is a 80 yo  woman with petit mal epilepsy since age 19 (last seizure age 26), post-traumatic lumbar spondylosis, sacroiliac degenerative joint disease, difficult to control renovascular hypertension (s/p renal angiogram on 3/8/17), hypothyroidism, diffuse large B cell lymphoma (diagnosed 8/23/16, last chemo 1/17/17). She lives in [place], [place] with her , who has lung cancer. She has 7 children. She uses a straight cane to ambulate. Her primary care physician is Dr. Jose Torres. Her oncologist is Dr. Tyson Arredondo. Her cardiologist is Dr. Timmy Simmons.    Patient presented to [facility] ED on 3/13/17 after being found on floor by her daughter after falling.  Patient reports that she was attempting to walk outside at approximately 3:30 pm when she began to feel weak and dizzy.  She states that she fell forward and hit head and shoulder on door and then proceeded to break her fall to the floor by using her hands. She denies loss of consciousness, but reports that she was too weak to move or call for help.  She was found by her daughter at approximately 5 pm.  She denies headache, nausea, vomiting, paresthesias, chest pain, or shortness of breath prior to fall.  Denies hematemesis, melena, hematochezia.  Patient reports that she has been experiencing dizziness and weakness x 3-4 weeks.    Upon arrival to ED patient  with laceration of 4th and 5th right fingers (sutured by ED physician); found to have Hgb/Hct 6.7/20 (down from 8.9/25.9 on 3/8/17).  Head CT with no acute hemorrhage.  Right shoulder x-ray with comminuted fracture of the right humerus with intra-articular extension.  Minimally displaced fracture of the base of the distal phalanx of the 5th digit noted on right hand x-ray.  Orthopedic surgeon Dr. Saravia was consulted by ED physician.  Patient given tylenol 650 mg and NS infusion at 125 mls/hr in ED.  Admitted to Hospital Medicine service with symptomatic anemia.      Past Medical History:   Diagnosis Date    Cancer     colon    Hypertension     Petit mal epilepsy 1954    Scoliosis of lumbar spine     Seizures     Unspecified hypothyroidism        Past Surgical History:   Procedure Laterality Date    APPENDECTOMY      BACK SURGERY  1988    vertebral fracture    BACK SURGERY  02/2013    lumbar L2-5    CATARACT EXTRACTION, BILATERAL Bilateral     CHOLECYSTECTOMY      open    COLON SURGERY      EYE SURGERY Bilateral     cataract removal with lens implant    HYSTERECTOMY      PORTACATH PLACEMENT Right 09/2016    TONSILLECTOMY      VAGINAL HYSTERECTOMY W/ ANTERIOR AND POSTERIOR VAGINAL REPAIR         Review of patient's allergies indicates:   Allergen Reactions    Adhesive Itching and Blisters    Penicillins Anaphylaxis    Tramadol Hives    Avelox [moxifloxacin] Rash     Facial and arm itching and redness. Pt states throat closes when given.    Amoxil [amoxicillin]     Aspridrox [aspirin, buffered]     Codeine Other (See Comments)     Throat swelling    Keflex [cephalexin]     Norvasc [amlodipine]     Red dye Hives    Sulfa (sulfonamide antibiotics)     Tylenol [acetaminophen]      Has reaction to Tylenol with red dye and unable to take Extra Strength Tylenol/ CAN ONLY TOLERATE REG STRENGTH TYLENOL       No current facility-administered medications on file prior to encounter.      Current  Outpatient Prescriptions on File Prior to Encounter   Medication Sig    acetaminophen (TYLENOL) 325 MG tablet Take 325 mg by mouth every 4 to 6 hours as needed.     carbamazepine (EPITOL) 200 mg tablet Take 1 tablet (200 mg total) by mouth nightly.    carvedilol (COREG) 25 MG tablet Take 1 tablet (25 mg total) by mouth 2 (two) times daily with meals. (Patient taking differently: Take 25 mg by mouth 2 (two) times daily. )    clopidogrel (PLAVIX) 75 mg tablet Take 1 tablet (75 mg total) by mouth once daily.    levothyroxine (SYNTHROID) 100 MCG tablet Take 1 tablet (100 mcg total) by mouth once daily.    lisinopril (PRINIVIL,ZESTRIL) 40 MG tablet Take 1 tablet (40 mg total) by mouth once daily.    phenobarbital (LUMINAL) 64.8 MG tablet Take 1 tablet (64.8 mg total) by mouth every evening.    [DISCONTINUED] chlorthalidone (HYGROTEN) 25 MG Tab Take 25 mg by mouth once daily.     Family History     Problem Relation (Age of Onset)    Heart attack Father    Hypertension Father    Pancreatic cancer Mother        Social History Main Topics    Smoking status: Never Smoker    Smokeless tobacco: Not on file    Alcohol use No    Drug use: No    Sexual activity: Not on file     Review of Systems   Constitutional: Negative for chills and fever.   HENT: Negative for congestion and sore throat.    Eyes: Negative for photophobia and visual disturbance.   Respiratory: Negative for cough and shortness of breath.    Cardiovascular: Negative for chest pain and palpitations.   Gastrointestinal: Negative for abdominal pain, nausea and vomiting.   Endocrine: Negative for cold intolerance and heat intolerance.   Genitourinary: Negative for dysuria, frequency and urgency.   Musculoskeletal: Positive for arthralgias and myalgias. Negative for neck pain.   Skin: Negative for color change and pallor.   Neurological: Positive for dizziness, weakness and light-headedness. Negative for numbness.   Hematological: Bruises/bleeds easily.    Psychiatric/Behavioral: Negative for agitation and confusion.     Objective:     Vital Signs (Most Recent):  Temp: 97.9 °F (36.6 °C) (03/14/17 0220)  Pulse: 70 (03/14/17 0220)  Resp: 17 (03/14/17 0220)  BP: 124/60 (03/14/17 0220)  SpO2: (!) 94 % (03/14/17 0220) Vital Signs (24h Range):  Temp:  [97.9 °F (36.6 °C)-98 °F (36.7 °C)] 97.9 °F (36.6 °C)  Pulse:  [67-98] 70  Resp:  [16-20] 17  SpO2:  [94 %-99 %] 94 %  BP: ()/(42-76) 124/60     Weight: 63.5 kg (140 lb)  Body mass index is 30.3 kg/(m^2).    Physical Exam   Constitutional: She is oriented to person, place, and time. She appears well-developed and well-nourished. She is cooperative.   HENT:   Head: Normocephalic.       Mouth/Throat: Oropharynx is clear and moist.   Eyes: Conjunctivae are normal. Pupils are equal, round, and reactive to light. No scleral icterus.   Neck: Normal range of motion. Neck supple.   Cardiovascular: Normal rate, regular rhythm and intact distal pulses.    Pulmonary/Chest: Effort normal and breath sounds normal. No respiratory distress. She has no wheezes.   Abdominal: Soft. Bowel sounds are normal. She exhibits no distension. There is no tenderness.   Musculoskeletal: She exhibits edema and tenderness.        Right shoulder: She exhibits decreased range of motion.        Right lower leg: She exhibits tenderness and edema.        Left lower leg: She exhibits tenderness and edema.   1+ edema to bilateral lower extremities.  Decreased ROM to right 4th and 5th fingers.   Neurological: She is oriented to person, place, and time. GCS eye subscore is 4. GCS verbal subscore is 5. GCS motor subscore is 6.   Drowsy   Skin: Skin is warm and dry. Abrasion, bruising, ecchymosis and laceration noted.   Bruising to bilateral upper extremities.  Lacerations to right 4th and 5th fingers.   Psychiatric: She has a normal mood and affect. Her speech is normal and behavior is normal.   Nursing note and vitals reviewed.       Significant Labs:   CBC:    Recent Labs  Lab 03/13/17  2315   WBC 7.17   HGB 6.7*   HCT 20.0*   PLT 82*     CMP:   Recent Labs  Lab 03/13/17  2315   *   K 3.6   CL 94*   CO2 26   *   BUN 16   CREATININE 0.7   CALCIUM 8.3*   PROT 5.4*   ALBUMIN 3.3*   BILITOT 0.4   ALKPHOS 59   AST 17   ALT 12   ANIONGAP 7*   EGFRNONAA >60     Coagulation: No results for input(s): INR, APTT in the last 48 hours.    Invalid input(s): PT  All pertinent labs within the past 24 hours have been reviewed.    Significant Imaging: I have reviewed all pertinent imaging results/findings within the past 24 hours.     EKG: Normal sinus rhythm    X-Ray Chest AP Portable:   1.  No acute cardiopulmonary process.  2.  Comminuted fracture of the right humerus with intra-articular extension.    X-Ray Elbow Complete Right: No definite evidence for fracture.    X-Ray Shoulder Trauma Right: Fracture through the greater tuberosity of the humerus with large effusion and anterior/inferior displacement of the humeral head.  Further evaluation is clinically warranted.    X-Ray Hand 3 view Right: Minimally displaced fracture at the base of the distal phalanx of the fifth digit as above.    CT Head Without Contrast:   - No acute intracranial abnormalities.  - Stable senescent findings and either prominent perivascular space or remote infarct within the right subinsular region.    Assessment/Plan:     * Symptomatic anemia  Anemia due to antineoplastic chemotherapy  Diffuse large B cell lymphoma  Last chemotherapy on 1/17/17.  Patient recently required transfusion of 2 units pRBCs during 1/26/17-1/27/17 hospitalization.  Hgb/Hct 6.7/20 (down from 8.9/25.9 on 3/8/17).  Will transfuse 2 units pRBCs.  Daily CBC.        Renovascular hypertension  Currently with BP 90/42.  Will hold lisinopril and carvedilol. Give IVFs.  Continue to monitor.  Resume medications when appropriate.      Nonintractable absence epilepsy without status epilepticus  Resume home dose phenobarbital and  carbamazepine.  Seizure precautions.      Acquired hypothyroidism  Resume synthroid.      Osteoarthritis of lumbar spine  Degenerative joint disease of sacroiliac joint  PRN pain medications.      Chronic hyponatremia  Sodium 127 on admission, up from 125 on 3/8/17.  Continue to monitor.      Bilateral renal artery stenosis  S/p renal angiogram on 3/8/17.      Hypomagnesemia  Magnesium 1.2 on admission.  Replace.  Daily Magnesium level.      Thrombocytopenia, unspecified  Chronic.  Plaelet count 82K on admission, down from 106K on 3/8/17.  Continue to monitor.  Avoid heparin products.      Closed comminuted fracture of right humerus  S/p fall.  Orthopedic surgeon Dr. Kevin Saravia consulted by ED physician; appreciate assistance.  PT/OT evaluations after patient evaluated by Ortho.  PRN pain medication.      Closed displaced fracture of distal phalanx of right little finger  Will likely require splint.  Orthopedic surgery consulted; appreciate assistance.      NSTEMI  NSR on admission EKG.  Add-on Troponin resulted at 0.145.  Patient with aspirin allergy.  Resume home dose plavix 75 mg daily.  Holding carvedilol due to hypotension.  Monitor on telemetry, serial troponin.  Consult cardiology; appreciate assistance.    VTE Risk Mitigation         Ordered     High Risk of VTE  Once      03/14/17 0253     Place PATRICIO hose  Until discontinued      03/14/17 0253     Place sequential compression device  Until discontinued      03/14/17 0253        Izzy Vincent NP  Department of Hospital Medicine   [facility]

## 2017-03-14 NOTE — ASSESSMENT & PLAN NOTE
Renal angiogram last week with patent right renal artery and severe ostial left renal artery stenosis-no intervention done; plans for intervention via different approach at later date; BP stable currently with hypotension overnight; ACEI, BB and Chlorthalidone on hold for now and agree with this; will monitor BP and slowly resume medications; okay to hold Plavix due to anemia and since no recent intervention performed

## 2017-03-14 NOTE — ASSESSMENT & PLAN NOTE
Initial troponin .145 with trend down to .067; EKG with no acute changes; likely related to demand etiology in setting of anemia and hypotension; recent echo with normal LVEF; no concern for ACS currently; will recheck EKG and CE in AM; no need for repeat echocardiogram

## 2017-03-14 NOTE — ED NOTES
Pt presents to ED c/o fall at 1530, states the fall was due to weakness. Pt states that she was worried because she takes Plavix and believes she took her finger nail off when she fell. States she hit the door with her head, right shoulder, and right arm. Pt denies numbness/ tingling, and is able to move all fingers of her right hand. Pt's right arm is in a sling, applied by EMS. Denies LOC, states she doesn't think she hit her head as hard as right shoulder/ arm. Pt states she is having the most pain to her arm, states it feels heavy, rated 8/10. States that when EMS put her on the stretcher she felt dizzy/ nauseous, but that feeling has passed. Denies currently feeling nauseous.

## 2017-03-14 NOTE — ED NOTES
CT called about order.Pt stable with blood infusing.Pt tolerating well. VSS. Family at bedside. Safety maintained

## 2017-03-14 NOTE — SUBJECTIVE & OBJECTIVE
Past Medical History:   Diagnosis Date    Cancer     colon    Hypertension     Petit mal epilepsy 1954    Scoliosis of lumbar spine     Seizures     Unspecified hypothyroidism        Past Surgical History:   Procedure Laterality Date    APPENDECTOMY      BACK SURGERY  1988    vertebral fracture    BACK SURGERY  02/2013    lumbar L2-5    CATARACT EXTRACTION, BILATERAL Bilateral     CHOLECYSTECTOMY      open    COLON SURGERY      EYE SURGERY Bilateral     cataract removal with lens implant    HYSTERECTOMY      PORTACATH PLACEMENT Right 09/2016    TONSILLECTOMY      VAGINAL HYSTERECTOMY W/ ANTERIOR AND POSTERIOR VAGINAL REPAIR         Review of patient's allergies indicates:   Allergen Reactions    Adhesive Itching and Blisters    Penicillins Anaphylaxis    Tramadol Hives    Avelox [moxifloxacin] Rash     Facial and arm itching and redness. Pt states throat closes when given.    Amoxil [amoxicillin]     Aspridrox [aspirin, buffered]     Codeine Other (See Comments)     Throat swelling    Keflex [cephalexin]     Norvasc [amlodipine]     Red dye Hives    Sulfa (sulfonamide antibiotics)     Tylenol [acetaminophen]      Has reaction to Tylenol with red dye and unable to take Extra Strength Tylenol/ CAN ONLY TOLERATE REG STRENGTH TYLENOL       No current facility-administered medications on file prior to encounter.      Current Outpatient Prescriptions on File Prior to Encounter   Medication Sig    acetaminophen (TYLENOL) 325 MG tablet Take 325 mg by mouth every 4 to 6 hours as needed.     carbamazepine (EPITOL) 200 mg tablet Take 1 tablet (200 mg total) by mouth nightly.    carvedilol (COREG) 25 MG tablet Take 1 tablet (25 mg total) by mouth 2 (two) times daily with meals. (Patient taking differently: Take 25 mg by mouth 2 (two) times daily. )    clopidogrel (PLAVIX) 75 mg tablet Take 1 tablet (75 mg total) by mouth once daily.    levothyroxine (SYNTHROID) 100 MCG tablet Take 1 tablet (100  mcg total) by mouth once daily.    lisinopril (PRINIVIL,ZESTRIL) 40 MG tablet Take 1 tablet (40 mg total) by mouth once daily.    phenobarbital (LUMINAL) 64.8 MG tablet Take 1 tablet (64.8 mg total) by mouth every evening.    [DISCONTINUED] chlorthalidone (HYGROTEN) 25 MG Tab Take 25 mg by mouth once daily.     Family History     Problem Relation (Age of Onset)    Heart attack Father    Hypertension Father    Pancreatic cancer Mother        Social History Main Topics    Smoking status: Never Smoker    Smokeless tobacco: Not on file    Alcohol use No    Drug use: No    Sexual activity: Not on file     Review of Systems   Constitutional: Negative for chills and fever.   HENT: Negative for congestion and sore throat.    Eyes: Negative for photophobia and visual disturbance.   Respiratory: Negative for cough and shortness of breath.    Cardiovascular: Negative for chest pain and palpitations.   Gastrointestinal: Negative for abdominal pain, nausea and vomiting.   Endocrine: Negative for cold intolerance and heat intolerance.   Genitourinary: Negative for dysuria, frequency and urgency.   Musculoskeletal: Positive for arthralgias and myalgias. Negative for neck pain.   Skin: Negative for color change and pallor.   Neurological: Positive for dizziness, weakness and light-headedness. Negative for numbness.   Hematological: Bruises/bleeds easily.   Psychiatric/Behavioral: Negative for agitation and confusion.     Objective:     Vital Signs (Most Recent):  Temp: 97.9 °F (36.6 °C) (03/14/17 0220)  Pulse: 70 (03/14/17 0220)  Resp: 17 (03/14/17 0220)  BP: 124/60 (03/14/17 0220)  SpO2: (!) 94 % (03/14/17 0220) Vital Signs (24h Range):  Temp:  [97.9 °F (36.6 °C)-98 °F (36.7 °C)] 97.9 °F (36.6 °C)  Pulse:  [67-98] 70  Resp:  [16-20] 17  SpO2:  [94 %-99 %] 94 %  BP: ()/(42-76) 124/60     Weight: 63.5 kg (140 lb)  Body mass index is 30.3 kg/(m^2).    Physical Exam   Constitutional: She is oriented to person, place, and  time. She appears well-developed and well-nourished. She is cooperative.   HENT:   Head: Normocephalic.       Mouth/Throat: Oropharynx is clear and moist.   Eyes: Conjunctivae are normal. Pupils are equal, round, and reactive to light. No scleral icterus.   Neck: Normal range of motion. Neck supple.   Cardiovascular: Normal rate, regular rhythm and intact distal pulses.    Pulmonary/Chest: Effort normal and breath sounds normal. No respiratory distress. She has no wheezes.   Abdominal: Soft. Bowel sounds are normal. She exhibits no distension. There is no tenderness.   Musculoskeletal: She exhibits edema and tenderness.        Right shoulder: She exhibits decreased range of motion.        Right lower leg: She exhibits tenderness and edema.        Left lower leg: She exhibits tenderness and edema.   1+ edema to bilateral lower extremities.  Decreased ROM to right 4th and 5th fingers.   Neurological: She is oriented to person, place, and time. GCS eye subscore is 4. GCS verbal subscore is 5. GCS motor subscore is 6.   Drowsy   Skin: Skin is warm and dry. Abrasion, bruising, ecchymosis and laceration noted.   Bruising to bilateral upper extremities.  Lacerations to right 4th and 5th fingers.   Psychiatric: She has a normal mood and affect. Her speech is normal and behavior is normal.   Nursing note and vitals reviewed.       Significant Labs:   CBC:   Recent Labs  Lab 03/13/17  2315   WBC 7.17   HGB 6.7*   HCT 20.0*   PLT 82*     CMP:   Recent Labs  Lab 03/13/17  2315   *   K 3.6   CL 94*   CO2 26   *   BUN 16   CREATININE 0.7   CALCIUM 8.3*   PROT 5.4*   ALBUMIN 3.3*   BILITOT 0.4   ALKPHOS 59   AST 17   ALT 12   ANIONGAP 7*   EGFRNONAA >60     Coagulation: No results for input(s): INR, APTT in the last 48 hours.    Invalid input(s): PT  All pertinent labs within the past 24 hours have been reviewed.    Significant Imaging: I have reviewed all pertinent imaging results/findings within the past 24 hours.      X-Ray Chest AP Portable:   1.  No acute cardiopulmonary process.  2.  Comminuted fracture of the right humerus with intra-articular extension.    X-Ray Elbow Complete Right: No definite evidence for fracture.    X-Ray Shoulder Trauma Right: Fracture through the greater tuberosity of the humerus with large effusion and anterior/inferior displacement of the humeral head.  Further evaluation is clinically warranted.    X-Ray Hand 3 view Right: Minimally displaced fracture at the base of the distal phalanx of the fifth digit as above.    CT Head Without Contrast:   - No acute intracranial abnormalities.  - Stable senescent findings and either prominent perivascular space or remote infarct within the right subinsular region.

## 2017-03-15 ENCOUNTER — PATIENT MESSAGE (OUTPATIENT)
Dept: NEUROLOGY | Facility: CLINIC | Age: 82
End: 2017-03-15

## 2017-03-15 LAB
ANION GAP SERPL CALC-SCNC: 8 MMOL/L
BASOPHILS # BLD AUTO: 0.01 K/UL
BASOPHILS NFR BLD: 0.2 %
BUN SERPL-MCNC: 12 MG/DL
CALCIUM SERPL-MCNC: 8.2 MG/DL
CHLORIDE SERPL-SCNC: 96 MMOL/L
CK SERPL-CCNC: 348 U/L
CO2 SERPL-SCNC: 24 MMOL/L
CREAT SERPL-MCNC: 0.7 MG/DL
DIFFERENTIAL METHOD: ABNORMAL
EOSINOPHIL # BLD AUTO: 0.1 K/UL
EOSINOPHIL NFR BLD: 1.2 %
ERYTHROCYTE [DISTWIDTH] IN BLOOD BY AUTOMATED COUNT: 19.7 %
EST. GFR  (AFRICAN AMERICAN): >60 ML/MIN/1.73 M^2
EST. GFR  (NON AFRICAN AMERICAN): >60 ML/MIN/1.73 M^2
GLUCOSE SERPL-MCNC: 113 MG/DL
HCT VFR BLD AUTO: 27.8 %
HGB BLD-MCNC: 9.4 G/DL
LYMPHOCYTES # BLD AUTO: 0.2 K/UL
LYMPHOCYTES NFR BLD: 2.8 %
MAGNESIUM SERPL-MCNC: 1.5 MG/DL
MCH RBC QN AUTO: 32.8 PG
MCHC RBC AUTO-ENTMCNC: 33.8 %
MCV RBC AUTO: 97 FL
MONOCYTES # BLD AUTO: 0.8 K/UL
MONOCYTES NFR BLD: 12.8 %
NEUTROPHILS # BLD AUTO: 5 K/UL
NEUTROPHILS NFR BLD: 83 %
PLATELET # BLD AUTO: 66 K/UL
PMV BLD AUTO: 10.2 FL
POTASSIUM SERPL-SCNC: 3.6 MMOL/L
RBC # BLD AUTO: 2.87 M/UL
SODIUM SERPL-SCNC: 128 MMOL/L
TROPONIN I SERPL DL<=0.01 NG/ML-MCNC: 0.13 NG/ML
WBC # BLD AUTO: 6 K/UL

## 2017-03-15 PROCEDURE — 63600175 PHARM REV CODE 636 W HCPCS: Performed by: HOSPITALIST

## 2017-03-15 PROCEDURE — 83735 ASSAY OF MAGNESIUM: CPT

## 2017-03-15 PROCEDURE — 25000003 PHARM REV CODE 250: Performed by: HOSPITALIST

## 2017-03-15 PROCEDURE — 99223 1ST HOSP IP/OBS HIGH 75: CPT | Mod: ,,, | Performed by: INTERNAL MEDICINE

## 2017-03-15 PROCEDURE — 25000003 PHARM REV CODE 250: Performed by: NURSE PRACTITIONER

## 2017-03-15 PROCEDURE — 97161 PT EVAL LOW COMPLEX 20 MIN: CPT

## 2017-03-15 PROCEDURE — 94761 N-INVAS EAR/PLS OXIMETRY MLT: CPT

## 2017-03-15 PROCEDURE — 11000001 HC ACUTE MED/SURG PRIVATE ROOM

## 2017-03-15 PROCEDURE — 93005 ELECTROCARDIOGRAM TRACING: CPT

## 2017-03-15 PROCEDURE — 86580 TB INTRADERMAL TEST: CPT | Performed by: HOSPITALIST

## 2017-03-15 PROCEDURE — 97110 THERAPEUTIC EXERCISES: CPT

## 2017-03-15 PROCEDURE — 97166 OT EVAL MOD COMPLEX 45 MIN: CPT

## 2017-03-15 PROCEDURE — G8987 SELF CARE CURRENT STATUS: HCPCS | Mod: CL

## 2017-03-15 PROCEDURE — 80048 BASIC METABOLIC PNL TOTAL CA: CPT

## 2017-03-15 PROCEDURE — 85025 COMPLETE CBC W/AUTO DIFF WBC: CPT

## 2017-03-15 PROCEDURE — 82550 ASSAY OF CK (CPK): CPT

## 2017-03-15 PROCEDURE — G8988 SELF CARE GOAL STATUS: HCPCS | Mod: CK

## 2017-03-15 PROCEDURE — 84484 ASSAY OF TROPONIN QUANT: CPT

## 2017-03-15 RX ORDER — LORAZEPAM/0.9% SODIUM CHLORIDE 100MG/0.1L
2 PLASTIC BAG, INJECTION (ML) INTRAVENOUS
Status: COMPLETED | OUTPATIENT
Start: 2017-03-15 | End: 2017-03-15

## 2017-03-15 RX ADMIN — MAGNESIUM SULFATE IN WATER 2 G: 40 INJECTION, SOLUTION INTRAVENOUS at 11:03

## 2017-03-15 RX ADMIN — SODIUM CHLORIDE: 0.9 INJECTION, SOLUTION INTRAVENOUS at 07:03

## 2017-03-15 RX ADMIN — PHENOBARBITAL 64.8 MG: 32.4 TABLET ORAL at 09:03

## 2017-03-15 RX ADMIN — CARVEDILOL 25 MG: 25 TABLET, FILM COATED ORAL at 09:03

## 2017-03-15 RX ADMIN — MAGNESIUM SULFATE IN WATER 2 G: 40 INJECTION, SOLUTION INTRAVENOUS at 09:03

## 2017-03-15 RX ADMIN — CARBAMAZEPINE 200 MG: 100 TABLET, CHEWABLE ORAL at 09:03

## 2017-03-15 RX ADMIN — ACETAMINOPHEN 650 MG: 325 TABLET ORAL at 05:03

## 2017-03-15 RX ADMIN — LEVOTHYROXINE SODIUM 100 MCG: 100 TABLET ORAL at 06:03

## 2017-03-15 RX ADMIN — ACETAMINOPHEN 650 MG: 325 TABLET ORAL at 11:03

## 2017-03-15 RX ADMIN — ACETAMINOPHEN 650 MG: 325 TABLET ORAL at 09:03

## 2017-03-15 RX ADMIN — Medication 5 UNITS: at 06:03

## 2017-03-15 NOTE — PROGRESS NOTES
The Sw spoke to Gypsy at [facility] and she stated the pt looks appropriate and she will pass the info to her DON to review. The Sw informed her the pt will not be ready for d/c until Friday.     3:55PM The Eduard faxed the PASRR to \[facility]\"".

## 2017-03-15 NOTE — PLAN OF CARE
Problem: Occupational Therapy Goal  Goal: Occupational Therapy Goal  Goals to be met by: 3/25/2017    Patient will increase functional independence with ADLs by performing:    Feeding with Modified [place].  UE Dressing with Moderate Assistance.  LE Dressing with Moderate Assistance.  Grooming while standing with Supervision.  Toileting from bedside commode with Contact Guard Assistance for hygiene and clothing management.   Bathing from edge of bed with Minimal Assistance.  Stand pivot transfers with Supervision.  Toilet transfer to bedside commode with Supervision.  Upper extremity exercise program x10 reps per handout, with supervision for R fingers except 5th digit and hand.  Increase r hand AROM wfl except 5th digit .  Outcome: Ongoing (interventions implemented as appropriate)  Pt. limited by R shld immobilization in sling 2/2 humerus fx, NWB precautions; Ortho consulted and recommending non-surgical treatment 2/2 pt not good candidate 2/2 recent chemo tx. Pt. Limited by RUE pain, limited ROM, weakness, impaired standing balance, hx back pain from scoliosis.  She performed sit<>stand with CGA form BS chair, stand pivot .ftoBSc CGA; toileting simulated max assist ; UB dressing max assist; LB dressing total assist for socks; feeding setup to cut food -fed self with LUE(nondominate); grooming SBA (simulated 2/2 lunch tray arrived.  Pt. Would benefit from SNF. Pt. Has all necessary DME at home.  Continue with OT POCV.

## 2017-03-15 NOTE — PLAN OF CARE
Patient had a fall at home. Patient lives alone and is unsure if she will be able to home after discharge. Her  lives at the [facility] currently. Patient has a rolling walker, standard walker, and standard can at home.        03/15/17 1051   Discharge Assessment   Assessment Type Discharge Planning Assessment   Confirmed/corrected address and phone number on facesheet? Yes   Assessment information obtained from? Patient   Expected Length of Stay (days) 3   Type of Healthcare Directive Received Living will   Prior to hospitilization cognitive status: Alert/Oriented   Prior to hospitalization functional status: Independent   Current cognitive status: Alert/Oriented   Current Functional Status: Assistive Equipment   Arrived From home or self-care   Lives With alone   Is patient able to care for self after discharge? Unable to determine at this time (comments)   Patient's perception of discharge disposition skilled nursing facility   Readmission Within The Last 30 Days no previous admission in last 30 days   Patient currently being followed by outpatient case management? No   Patient currently receives home health services? No   Does the patient currently use HME? Yes   Patient currently receives private duty nursing? No   Patient currently receives any other outside agency services? No   Equipment Currently Used at Home walker, rolling;walker, standard;cane, straight   Do you have any problems affording any of your prescribed medications? No   Is the patient taking medications as prescribed? yes   Do you have any financial concerns preventing you from receiving the healthcare you need? No   On Dialysis? No   Does the patient receive services at the [facility]? No   Discharge Plan A Skilled Nursing Facility   Discharge Plan B Home with family;Home Health   Patient/Family In Agreement With Plan yes       Yasmine Mandel RN  Transition Navigator  [phone]

## 2017-03-15 NOTE — PROGRESS NOTES
The Sw faxed the pt's info via [facility] to [facility].

## 2017-03-15 NOTE — PLAN OF CARE
Patient had a fall at home. Patient lives alone and is unsure if she will be able to home after discharge. Her  lives at the [facility] currently. Patient has a rolling walker, standard walker, and standard can at home.        03/15/17 1051   Discharge Assessment   Assessment Type Discharge Planning Assessment   Confirmed/corrected address and phone number on facesheet? Yes   Assessment information obtained from? Patient   Expected Length of Stay (days) 1   Type of Healthcare Directive Received Living will   Prior to hospitilization cognitive status: Alert/Oriented   Prior to hospitalization functional status: Independent   Current cognitive status: Alert/Oriented   Current Functional Status: Assistive Equipment   Arrived From home or self-care   Lives With alone   Is patient able to care for self after discharge? Unable to determine at this time (comments)   How many people do you have in your home that can help with your care after discharge? 1   Patient's perception of discharge disposition skilled nursing facility   Readmission Within The Last 30 Days no previous admission in last 30 days   Patient currently being followed by outpatient case management? No   Patient currently receives home health services? No   Does the patient currently use HME? Yes   Patient currently receives private duty nursing? No   Patient currently receives any other outside agency services? No   Equipment Currently Used at Home walker, rolling;walker, standard;cane, straight   Do you have any problems affording any of your prescribed medications? No   Is the patient taking medications as prescribed? yes   Do you have any financial concerns preventing you from receiving the healthcare you need? No   On Dialysis? No   Does the patient receive services at the [facility]? No   Discharge Plan A Skilled Nursing Facility   Discharge Plan B Home with family;Home Health   Patient/Family In Agreement With Plan yes       Yasmine  Tequila ARRIOLA  Transition Navigator  [phone]

## 2017-03-15 NOTE — PLAN OF CARE
Problem: Physical Therapy Goal  Goal: Physical Therapy Goal  Goals to be met by: 3/29/2017     Patient will increase functional independence with mobility by performin. Supine to sit with Stand-by Assistance  2. Sit to stand transfer with Supervision  3. Gait x 50 feet with Supervision using Single-point Cane .   Outcome: Ongoing (interventions implemented as appropriate)  Initial PT evaluation performed.  Pt could benefit from skilled PT services 6x/wk in order to maximize function prior to D/C.

## 2017-03-15 NOTE — PLAN OF CARE
Problem: Patient Care Overview  Goal: Plan of Care Review  Room air SpO2   95%. Pt with no apparent distress noted. Will continue to monitor.

## 2017-03-15 NOTE — PT/OT/SLP EVAL
Physical Therapy  Evaluation    nAnette Garcia   MRN: [id number]   Admitting Diagnosis: Symptomatic anemia    PT Received On: 03/15/17  PT Start Time: 1043     PT Stop Time: 1105    PT Total Time (min): 22 min       Billable Minutes:  Evaluation 14 and Therapeutic Exercise 8    Diagnosis: Symptomatic anemia      Past Medical History:   Diagnosis Date    Cancer     colon    Hypertension     Petit mal epilepsy     Scoliosis of lumbar spine     Seizures     Unspecified hypothyroidism       Past Surgical History:   Procedure Laterality Date    APPENDECTOMY      BACK SURGERY      vertebral fracture    BACK SURGERY  2013    lumbar L2-5    CATARACT EXTRACTION, BILATERAL Bilateral     CHOLECYSTECTOMY      open    COLON SURGERY      EYE SURGERY Bilateral     cataract removal with lens implant    HYSTERECTOMY      PORTACATH PLACEMENT Right 2016    TONSILLECTOMY      VAGINAL HYSTERECTOMY W/ ANTERIOR AND POSTERIOR VAGINAL REPAIR           General Precautions: Standard, fall  Orthopedic Precautions: RUE non weight bearing   Braces: UE Sling       Do you have any cultural, spiritual, Presybeterian conflicts, given your current situation?: none    Patient History:  Lives With:  (was living alone while  [facility] per pt)  Living Arrangements: house  Home Accessibility: stairs to enter home  Number of Stairs to Enter Home: 2  Stair Railings at Home: none  Transportation Available: family or friend will provide  Living Environment Comment: tub shower combo, grab bars  Equipment Currently Used at Home: cane, straight, rollator, walker, rolling, bath bench, grab bar  DME owned (not currently used): none    Previous Level of Function:  Ambulation Skills: needs device  Transfer Skills: needs device  ADL Skills: needs device    Subjective:  Communicated with nsg prior to session.  Pt agreeable to eval  Chief Complaint: pain  Patient goals: PLOF    Pain Ratin/10         Location:   (R UE)  Pain Addressed: Pre-medicate for activity, Reposition, Distraction, Cessation of Activity, Nurse notified  Pain Rating Post-Intervention: 3/10    Objective:   Patient found with: peripheral IV, telemetry     Cognitive Exam:  Oriented to: Person, Place, Time and Situation    Follows Commands/attention: Follows one-step commands  Communication: clear/fluent  Safety awareness/insight to disability: impaired    Physical Exam:  Postural examination/scapula alignment: Rounded shoulder and Head forward    Skin integrity: Visible skin intact  Edema: None noted     Sensation:   Intact  light/touch BLE's    Upper Extremity Range of Motion:    Lower Extremity Range of Motion:  Right Lower Extremity: WFL  Left Lower Extremity: WFL    Lower Extremity Strength:  Right Lower Extremity: WFL  Left Lower Extremity: WFL     Fine motor coordination:  N/T    Gross motor coordination: impaired 2/2 weaqkness    Functional Mobility:  Bed Mobility:  Scooting/Bridging: Stand by Assistance  Supine to Sit: Moderate Assistance (with HOB elevated)    Transfers:  Sit <> Stand Assistance: Minimum Assistance  Sit <> Stand Assistive Device: Straight Cane    Gait:   Gait Distance: 6' with Vc for sequencing with SPC  Assistance 1: Minimum assistance  Gait Assistive Device: Single point cane  Gait Pattern: reciprocal  Gait Deviation(s): decreased alexa, increased time in double stance, decreased step length, decreased toe-to-floor clearance    Stairs:  N/T    Balance:   Static Sit: FAIR+: Able to take MINIMAL challenges from all directions  Dynamic Sit: FAIR: Cannot move trunk without losing balance  Static Stand: FAIR: Maintains without assist but unable to take challenges  Dynamic stand: FAIR: Needs CONTACT GUARD during gait    Therapeutic Activities and Exercises:  Pt performed B AP's. FAQ's, Marching x 10 in sitting and AP's and TKE's x 10 in recliner.  Instructed pt to continue to perform LE there ex while up in chair and in bed.  Pt  verbalized/demonstrated understanding, needs reinforcement      AM-PAC 6 CLICK MOBILITY  How much help from another person does this patient currently need?   1 = Unable, Total/Dependent Assistance  2 = A lot, Maximum/Moderate Assistance  3 = A little, Minimum/Contact Guard/Supervision  4 = None, Modified [place]/Independent    Turning over in bed (including adjusting bedclothes, sheets and blankets)?: 3  Sitting down on and standing up from a chair with arms (e.g., wheelchair, bedside commode, etc.): 3  Moving from lying on back to sitting on the side of the bed?: 2  Moving to and from a bed to a chair (including a wheelchair)?: 3  Need to walk in hospital room?: 3  Climbing 3-5 steps with a railing?: 2  Total Score: 16     AM-PAC Raw Score CMS G-Code Modifier Level of Impairment Assistance   6 % Total / Unable   7 - 9 CM 80 - 100% Maximal Assist   10 - 14 CL 60 - 80% Moderate Assist   15 - 19 CK 40 - 60% Moderate Assist   20 - 22 CJ 20 - 40% Minimal Assist   23 CI 1-20% SBA / CGA   24 CH 0% Independent/ Mod I     Patient left up in chair with all lines intact, call button in reach, nsg notified and daughter present.    Assessment:   Annette Garcia is a 81 y.o. female with a medical diagnosis of Symptomatic anemia and presents with decreased functional independence 2/2 pain, R UE dysfunction, deconditioning, and gen weakness.  Pt could benefit from skilled PT services to address deficits below in order to maximize function prior to D/C.    Rehab identified problem list/impairments: Rehab identified problem list/impairments: weakness, impaired endurance, impaired self care skills, impaired functional mobilty, decreased coordination, impaired balance, gait instability, decreased upper extremity function, decreased safety awareness, pain, impaired cardiopulmonary response to activity    Rehab potential is good.    Activity tolerance: Fair    Discharge recommendations: Discharge Facility/Level Of Care  Needs: nursing facility, skilled     Barriers to discharge: Barriers to Discharge: Decreased caregiver support, Inaccessible home environment    Equipment recommendations: Equipment Needed After Discharge: none     GOALS:   Physical Therapy Goals        Problem: Physical Therapy Goal    Goal Priority Disciplines Outcome Goal Variances Interventions   Physical Therapy Goal     PT/OT, PT Ongoing (interventions implemented as appropriate)     Description:  Goals to be met by: 3/29/2017     Patient will increase functional independence with mobility by performin. Supine to sit with Stand-by Assistance  2. Sit to stand transfer with Supervision  3. Gait  x 50 feet with Supervision using Single-point Cane .                 PLAN:    Patient to be seen 5 x/week to address the above listed problems via gait training, therapeutic activities, therapeutic exercises  Plan of Care expires: 17  Plan of Care reviewed with: patient, daughter          Aubree Ramirez, PT  03/15/2017

## 2017-03-15 NOTE — PLAN OF CARE
Problem: Pain, Acute (Adult)  Goal: Acceptable Pain Control/Comfort Level  Patient will demonstrate the desired outcomes by discharge/transition of care.   Outcome: Ongoing (interventions implemented as appropriate)  Pts pain level assessed. Pt c/o right shoulder pain. Pain med administered around the clock. Pt repositioned to help relieve pain. Nurse will continue to assess pain level.

## 2017-03-15 NOTE — PT/OT/SLP EVAL
Occupational Therapy  Evaluation    Annette Garcia   MRN: [id number]   Admitting Diagnosis: Symptomatic anemia    OT Date of Treatment: 03/15/17   OT Start Time: 1226  OT Stop Time: 1242  OT Total Time (min): 16 min    Billable Minutes:  Evaluation 16  Total Time 16    Diagnosis: Symptomatic anemia ; Comminuted moderately displaced right proximal humerus fracture. 2. Right small finger fracture.The primary encounter diagnosis was Shoulder fracture, right, closed, initial encounter. Diagnoses of Pain, Symptomatic anemia, Finger fracture, right, closed, initial encounter, Renovascular hypertension, Nonintractable absence epilepsy without status epilepticus, Acquired hypothyroidism, Osteoarthritis of lumbar spine, unspecified spinal osteoarthritis complication status, Degenerative joint disease of sacroiliac joint, DLBCL (diffuse large B cell lymphoma), Chronic hyponatremia, Anemia due to antineoplastic chemotherapy, Bilateral renal artery stenosis, Hypomagnesemia, Thrombocytopenia, unspecified, Closed comminuted fracture of right humerus, initial encounter, Closed displaced fracture of distal phalanx of right little finger, initial encounter, NSTEMI (non-ST elevated myocardial infarction), and S/P exploratory laparotomy were also pertinent to this visit.      Past Medical History:   Diagnosis Date    Cancer     colon    Hypertension     Petit mal epilepsy 1954    Scoliosis of lumbar spine     Seizures     Unspecified hypothyroidism       Past Surgical History:   Procedure Laterality Date    APPENDECTOMY      BACK SURGERY  1988    vertebral fracture    BACK SURGERY  02/2013    lumbar L2-5    CATARACT EXTRACTION, BILATERAL Bilateral     CHOLECYSTECTOMY      open    COLON SURGERY      EYE SURGERY Bilateral     cataract removal with lens implant    HYSTERECTOMY      PORTACATH PLACEMENT Right 09/2016    TONSILLECTOMY      VAGINAL HYSTERECTOMY W/ ANTERIOR AND POSTERIOR VAGINAL REPAIR         Referring  physician: Francisca  Date referred to OT: 3/15/2017    General Precautions: Standard, seizure; fall  Orthopedic Precautions: RUE non weight bearing  Braces: UE Sling    Do you have any cultural, spiritual, Cheondoism conflicts, given your current situation?: none     Patient History:  Living Environment  Lives With: alone  Living Arrangements: house  Home Accessibility: stairs to enter home  Number of Stairs to Enter Home: 2 (seperated)  Stair Railings at Home: none  Transportation Available: family or friend will provide  Living Environment Comment: Pt. lives alone in [facility] with 2 stesp  with no hand rails; has tub/shower combo with grab bars and shower chair; al;so has walk n shower with seat but does not currently use; pt indep-sang  with ADLs and ambulates with rollator and or SPC; ; family drives; pt deos all IADLS. family lives in [place] and near ; pt has BSc, wc that she doesnot currently use.  Equipment Currently Used at Home: cane, straight, walker, rolling, rollator, bath bench, grab bar (has shower seatedsin walk n showwer and grab bars , wc and BSc she does not currently use but has access)    Prior level of function:   Bed Mobility/Transfers: needs device  Grooming: independent  Bathing: needs device  Upper Body Dressing: independent  Lower Body Dressing: independent  Toileting: independent  Home Management Skills: independent  Homemaking Responsibilities: Yes  Meal Prep Responsibility: Primary  Laundry Responsibility: Primary  Cleaning Responsibility: Primary  Bill Paying/Finance Responsibility: Primary  Shopping Responsibility: Primary  Driving License: No  Mode of Transportation: Family     Dominant hand: right    Subjective:  Communicated with nurse prior to session.    Chief Complaint: none  Patient/Family stated goals: placement for  Continued therapy    Pain Ratin/10  Location - Side: Right  Location - Orientation: generalized  Location: shoulder  Pain Addressed: Pre-medicate for  activity, Reposition, Distraction  Pain Rating Post-Intervention: 3/10    Objective:  Patient found with: telemetry, peripheral IV    Cognitive Exam:  Oriented to: Person, Place, Time and Situation  Follows Commands/attention: Follows one-step commands and Follows two-step commands  Communication: clear/fluent  Memory:  No Deficits noted  Safety awareness/insight to disability: impaired  Coping skills/emotional control: Appropriate to situation    Visual/perceptual:  Intact    Physical Exam:  Postural examination/scapula alignment: Rounded shoulder  Skin integrity: Bruising of LUE  Edema: Mild B feet    Sensation:   Intact    Upper Extremity Range of Motion:  Right Upper Extremity: NT 2/2 immobilized in sling; index/thumb andliong finger ~45 flexion; 5th digit NT 2./2  fx with stitches and coban wrap; ringer finger coban wrap  Left Upper Extremity: WFL    Upper Extremity Strength:  Right Upper Extremity: Deficits: NT  Left Upper Extremity: WFL   Strength: R hand NT-L hand wfl    Fine motor coordination:   Impaired  Right hand thumb/finger opposition skills severe and Right hand, manipulation of objects max    Gross motor coordination: LUE wfl; RUE NT 2/2 imobilized in sling for humerus fx    Functional Mobility:  Bed Mobility:       Transfers:  Sit <> Stand Assistance: Contact Guard Assistance (slow and labored effort)  Sit <> Stand Assistive Device: No Assistive Device  Bed <> Chair Technique: Stand Pivot  Bed <> Chair Transfer Assistance: Contact Guard Assistance  Bed <> Chair Assistive Device: No Assistive Device  Toilet Transfer Technique: Stand Pivot  Toilet Transfer Assistance: Contact Guard Assistance  Toilet Transfer Assistive Device: bedside commode    Functional Ambulation: NT    Activities of Daily Living:  Feeding Level of Assistance: Set-up Assistance (uses L non dominate )    UE Dressing Level of Assistance: Maximum assistance    LE Dressing Level of Assistance: Total assistance (socks)    Grooming  "Position: Seated (simulated )  Grooming Level of Assistance: Stand by assistance     Toileting Level of Assistance: Maximum assistance     }    Balance:   Static Sit: GOOD+: Takes MAXIMAL challenges from all directions.    Dynamic Sit: FAIR+: Maintains balance through MINIMAL excursions of active trunk motion  Static Stand: POOR+: Needs MINIMAL assist to maintain  Dynamic stand: POOR: N/A    Therapeutic Activities and Exercises:  Role of OT And POC    AM-PAC 6 CLICK ADL  How much help from another person does this patient currently need?  1 = Unable, Total/Dependent Assistance  2 = A lot, Maximum/Moderate Assistance  3 = A little, Minimum/Contact Guard/Supervision  4 = None, Modified [place]/Independent    Putting on and taking off regular lower body clothing? : 2  Bathing (including washing, rinsing, drying)?: 2  Toileting, which includes using toilet, bedpan, or urinal? : 2  Putting on and taking off regular upper body clothing?: 2  Taking care of personal grooming such as brushing teeth?: 3  Eating meals?: 3  Total Score: 14    AM-PAC Raw Score CMS "G-Code Modifier Level of Impairment Assistance   6 % Total / Unable   7 - 9 CM 80 - 100% Maximal Assist   10 - 14 CL 60 - 80% Moderate Assist   15 - 19 CK 40 - 60% Moderate Assist   20 - 22 CJ 20 - 40% Minimal Assist   23 CI 1-20% SBA / CGA   24 CH 0% Independent/ Mod I       Patient left up in chair with all lines intact, call button in reach and family present    Assessment:  Annette Garcia is a 81 y.o. female with a medical diagnosis of Symptomatic anemia and presents with s/p fall with residual R humerus fx and 5th digit  finger fx immobilized in sling; the primary encounter diagnosis was Shoulder fracture, right, closed, initial encounter. Diagnoses of Pain, Symptomatic anemia, Finger fracture, right, closed, initial encounter, Renovascular hypertension, Nonintractable absence epilepsy without status epilepticus, Acquired hypothyroidism, " Osteoarthritis of lumbar spine, unspecified spinal osteoarthritis complication status, Degenerative joint disease of sacroiliac joint, DLBCL (diffuse large B cell lymphoma), Chronic hyponatremia, Anemia due to antineoplastic chemotherapy, Bilateral renal artery stenosis, Hypomagnesemia, Thrombocytopenia, unspecified, Closed comminuted fracture of right humerus, initial encounter, Closed displaced fracture of distal phalanx of right little finger, initial encounter, NSTEMI (non-ST elevated myocardial infarction), and S/P exploratory laparotomy were also pertinent to this visit. Pt. Presents to OT with R shld immobilization in sling 2/2 humerus fx, NWB precautions; Ortho consulted and recommending non-surgical treatment 2/2 pt not good candidate due recovering from recent chemo tx . Pt. limited by RUE pain, limited ROM, weakness, impaired standing balance, hx back pain from scoliosis. Pt. would benefit from SNF. Pt. Has all necessary DME at home. Continue with OT POC.            Rehab identified problem list/impairments: Rehab identified problem list/impairments: weakness, impaired endurance, pain, impaired self care skills, impaired functional mobilty, decreased upper extremity function, impaired balance, gait instability, decreased ROM    Rehab potential is good.    Activity tolerance: Good    Discharge recommendations: Discharge Facility/Level Of Care Needs: nursing facility, skilled     Barriers to discharge:      Equipment recommendations: none     GOALS:   Occupational Therapy Goals        Problem: Occupational Therapy Goal    Goal Priority Disciplines Outcome Interventions   Occupational Therapy Goal     OT, PT/OT Ongoing (interventions implemented as appropriate)    Description:  Goals to be met by: 3/25/2017    Patient will increase functional independence with ADLs by performing:    Feeding with Modified [place].  UE Dressing with Moderate Assistance.  LE Dressing with Moderate Assistance.  Grooming  while standing with Supervision.  Toileting from bedside commode with Contact Guard Assistance for hygiene and clothing management.   Bathing from  edge of bed with Minimal Assistance.  Stand pivot transfers with Supervision.  Toilet transfer to bedside commode with Supervision.  Upper extremity exercise program x10 reps per handout, with supervision for R fingers except 5th digit and hand.  Increase r hand AROM wfl except 5th digit .                PLAN:  Patient to be seen 5 x/week to address the above listed problems via self-care/home management, therapeutic activities, therapeutic exercises  Plan of Care expires: 04/15/17  Plan of Care reviewed with: patient, family    OT G-codes  Functional Assessment Tool Used: AM-PAC  Score: 14  Functional Limitation: Self care  Self Care Current Status (): CL  Self Care Goal Status (): MIKE Hurd OT  03/15/2017

## 2017-03-15 NOTE — CONSULTS
REASON FOR CONSULTATION:  Right arm and shoulder fracture.    HISTORY OF PRESENT ILLNESS:  A 81-year-old female fell last night, sustained   injury to her right shoulder and hand.  She is admitted for medical observation.    Currently, complaining of pain in the right shoulder and also in the right   hand.    PAST MEDICAL HISTORY:  Significant for non-Hodgkin's leukemia, epilepsy cancer,   hypertension, scoliosis, thyroid problems.    PREVIOUS SURGERY:  Includes appendectomy, back surgery, cataract surgery,   cholecystectomy, colon surgery, eye surgery, hysterectomy, tonsillectomy.    FAMILY HISTORY:  Positive for heart disease, hypertension and pancreatic cancer.    SOCIAL HISTORY:  The patient does not smoke or drink.    REVIEW OF SYSTEMS:  Negative for fever, chills, rashes.  She is just finishing   up chemotherapy.    CURRENT MEDICATIONS:  Reviewed on chart.    ALLERGIES:  To adhesives, penicillin, tramadol, Avelox, Amoxil, codeine, Keflex,   Norvasc, sulfa, Tylenol.    PHYSICAL EXAMINATION:  GENERAL:  Elderly female in no acute distress, alert and oriented x3.  EXTREMITIES:  Significant for left shoulder demonstrating swelling, tenderness   and bruising left shoulder and arm.  She has some skin lacerations, which was   superficial.  Range of motion of left shoulder limited secondary to pain.  NEUROLOGIC:  Intact.  She is able to move the fingers of the right hand well.    Small lacerations on the ring and small finger are noted with tenderness and   slight swelling.    X-RAYS:  AP and lateral of the right shoulder demonstrate a comminuted proximal   humerus fracture, which does involve the humeral head and greater tuberosity.    No dislocation noted.  CT scan of the right shoulder with 3D reconstruction   confirms that there is a large greater tuberosity fracture, which is displaced   somewhat superiorly; however, the main humeral head is intact with the shaft and   sits nicely in the glenoid fossa.  There may  be 3-4 fracture lines, which were   nondisplaced.    X-rays of the right knee demonstrate a nondisplaced distal phalanx fracture of   the right small finger.    IMPRESSION:  1.  Comminuted moderately displaced right proximal humerus fracture.  2.  Right small finger fracture.    PLAN:  I explained the nature of the injury to the patient and family today.    This fracture is not amendable to simple fixation.  Most likely, this would   require a hemiarthroplasty replacement of the shoulder if we chose surgical   treatment, but in lieu of the fact that she is just recovering chemotherapy with   low white count and hemoglobin.  I do not think she is a good surgical   candidate.    I think we can treat this nonoperatively in a sling with early physical therapy   and accept some deformity.  Most likely she will lose full elevation and   abduction of the shoulder.  I explained that today, but in a low demand patient,   this should not be a big functional deficit.  She is in agreement as is the   family will treat her with a sling, pain control and follow up in the office in   1 week.      MADISON  dd: 03/14/2017 15:44:58 (CDT)  td: 03/14/2017 22:02:24 (CDT)  Doc ID   #[id number]  Job ID #[id number]    CC:

## 2017-03-16 LAB
ANION GAP SERPL CALC-SCNC: 8 MMOL/L
BASOPHILS # BLD AUTO: 0.01 K/UL
BASOPHILS NFR BLD: 0.2 %
BUN SERPL-MCNC: 10 MG/DL
CALCIUM SERPL-MCNC: 8.5 MG/DL
CHLORIDE SERPL-SCNC: 92 MMOL/L
CO2 SERPL-SCNC: 28 MMOL/L
CREAT SERPL-MCNC: 0.6 MG/DL
DIFFERENTIAL METHOD: ABNORMAL
EOSINOPHIL # BLD AUTO: 0.1 K/UL
EOSINOPHIL NFR BLD: 2.7 %
ERYTHROCYTE [DISTWIDTH] IN BLOOD BY AUTOMATED COUNT: 18.2 %
EST. GFR  (AFRICAN AMERICAN): >60 ML/MIN/1.73 M^2
EST. GFR  (NON AFRICAN AMERICAN): >60 ML/MIN/1.73 M^2
GLUCOSE SERPL-MCNC: 121 MG/DL
HCT VFR BLD AUTO: 27 %
HGB BLD-MCNC: 9.2 G/DL
LYMPHOCYTES # BLD AUTO: 0.3 K/UL
LYMPHOCYTES NFR BLD: 5.7 %
MAGNESIUM SERPL-MCNC: 1.7 MG/DL
MCH RBC QN AUTO: 32.5 PG
MCHC RBC AUTO-ENTMCNC: 34.1 %
MCV RBC AUTO: 95 FL
MONOCYTES # BLD AUTO: 0.6 K/UL
MONOCYTES NFR BLD: 12.5 %
NEUTROPHILS # BLD AUTO: 4 K/UL
NEUTROPHILS NFR BLD: 78.5 %
PLATELET # BLD AUTO: 64 K/UL
PMV BLD AUTO: 9.4 FL
POTASSIUM SERPL-SCNC: 3.6 MMOL/L
RBC # BLD AUTO: 2.83 M/UL
SODIUM SERPL-SCNC: 128 MMOL/L
WBC # BLD AUTO: 5.13 K/UL

## 2017-03-16 PROCEDURE — 25000003 PHARM REV CODE 250: Performed by: NURSE PRACTITIONER

## 2017-03-16 PROCEDURE — 97535 SELF CARE MNGMENT TRAINING: CPT

## 2017-03-16 PROCEDURE — 97116 GAIT TRAINING THERAPY: CPT

## 2017-03-16 PROCEDURE — 11000001 HC ACUTE MED/SURG PRIVATE ROOM

## 2017-03-16 PROCEDURE — 94761 N-INVAS EAR/PLS OXIMETRY MLT: CPT

## 2017-03-16 PROCEDURE — 85025 COMPLETE CBC W/AUTO DIFF WBC: CPT

## 2017-03-16 PROCEDURE — 25000003 PHARM REV CODE 250: Performed by: HOSPITALIST

## 2017-03-16 PROCEDURE — 36415 COLL VENOUS BLD VENIPUNCTURE: CPT

## 2017-03-16 PROCEDURE — 80048 BASIC METABOLIC PNL TOTAL CA: CPT

## 2017-03-16 PROCEDURE — 97530 THERAPEUTIC ACTIVITIES: CPT

## 2017-03-16 PROCEDURE — 83735 ASSAY OF MAGNESIUM: CPT

## 2017-03-16 RX ORDER — FERROUS SULFATE 325(65) MG
325 TABLET, DELAYED RELEASE (ENTERIC COATED) ORAL DAILY
Status: DISCONTINUED | OUTPATIENT
Start: 2017-03-16 | End: 2017-03-17 | Stop reason: HOSPADM

## 2017-03-16 RX ORDER — POLYETHYLENE GLYCOL 3350 17 G/17G
17 POWDER, FOR SOLUTION ORAL 2 TIMES DAILY PRN
Status: DISCONTINUED | OUTPATIENT
Start: 2017-03-16 | End: 2017-03-17 | Stop reason: HOSPADM

## 2017-03-16 RX ORDER — HEPARIN 100 UNIT/ML
500 SYRINGE INTRAVENOUS
Status: DISCONTINUED | OUTPATIENT
Start: 2017-03-16 | End: 2017-03-17 | Stop reason: HOSPADM

## 2017-03-16 RX ORDER — LANOLIN ALCOHOL/MO/W.PET/CERES
400 CREAM (GRAM) TOPICAL DAILY
Status: DISCONTINUED | OUTPATIENT
Start: 2017-03-16 | End: 2017-03-17 | Stop reason: HOSPADM

## 2017-03-16 RX ORDER — ACETAMINOPHEN 325 MG/1
650 TABLET ORAL EVERY 4 HOURS PRN
Status: DISCONTINUED | OUTPATIENT
Start: 2017-03-16 | End: 2017-03-17 | Stop reason: HOSPADM

## 2017-03-16 RX ORDER — LISINOPRIL 20 MG/1
20 TABLET ORAL DAILY
Status: DISCONTINUED | OUTPATIENT
Start: 2017-03-16 | End: 2017-03-17 | Stop reason: HOSPADM

## 2017-03-16 RX ADMIN — CARVEDILOL 25 MG: 25 TABLET, FILM COATED ORAL at 06:03

## 2017-03-16 RX ADMIN — ACETAMINOPHEN 650 MG: 325 TABLET ORAL at 06:03

## 2017-03-16 RX ADMIN — PHENOBARBITAL 64.8 MG: 32.4 TABLET ORAL at 08:03

## 2017-03-16 RX ADMIN — ACETAMINOPHEN 650 MG: 325 TABLET ORAL at 03:03

## 2017-03-16 RX ADMIN — LISINOPRIL 20 MG: 20 TABLET ORAL at 09:03

## 2017-03-16 RX ADMIN — FERROUS SULFATE TAB EC 325 MG (65 MG FE EQUIVALENT) 325 MG: 325 (65 FE) TABLET DELAYED RESPONSE at 03:03

## 2017-03-16 RX ADMIN — LEVOTHYROXINE SODIUM 100 MCG: 100 TABLET ORAL at 06:03

## 2017-03-16 RX ADMIN — MAGNESIUM OXIDE TAB 400 MG (241.3 MG ELEMENTAL MG) 400 MG: 400 (241.3 MG) TAB at 03:03

## 2017-03-16 RX ADMIN — CARVEDILOL 25 MG: 25 TABLET, FILM COATED ORAL at 08:03

## 2017-03-16 RX ADMIN — CARBAMAZEPINE 200 MG: 100 TABLET, CHEWABLE ORAL at 08:03

## 2017-03-16 RX ADMIN — ACETAMINOPHEN 650 MG: 325 TABLET ORAL at 08:03

## 2017-03-16 NOTE — PROGRESS NOTES
[facility] Hospital Medicine  Progress Note    Patient Name: Annette Garcia  MRN: [id number]  Patient Class: IP- Inpatient   Admission Date: 3/13/2017  Length of Stay: 2 days  Attending Physician: Ryan Espinosa MD  Primary Care Provider: Jose Valles MD      Subjective:     Principal Problem:Symptomatic anemia    HPI:  Annette Garcia is a 82 yo  woman with petit mal epilepsy since age 19 (last seizure age 26), post-traumatic lumbar spondylosis, sacroiliac degenerative joint disease, difficult to control renovascular hypertension (s/p renal angiogram on 3/8/17), hypothyroidism, diffuse large B cell lymphoma (diagnosed 8/23/16, last chemo 1/17/17). She lives in [place], [place] with her , who has lung cancer. She has 7 children. She uses a straight cane to ambulate. Her primary care physician is Dr. Jose Torres. Her oncologist is Dr. Tyson Arredondo. Her cardiologist is Dr. Timmy Simmons.    Patient presented to [facility] ED on 3/13/17 after being found on floor by her daughter after falling.  Patient reports that she was attempting to walk outside at approximately 3:30 pm when she began to feel weak and dizzy.  She states that she fell forward and hit head and shoulder on door and then proceeded to break her fall to the floor by using her hands. She denies loss of consciousness, but reports that she was too weak to move or call for help.  She was found by her daughter at approximately 5 pm.  She denies headache, nausea, vomiting, paresthesias, chest pain, or shortness of breath prior to fall.  Denies hematemesis, melena, hematochezia.  Patient reports that she has been experiencing dizziness and weakness x 3-4 weeks.    Upon arrival to ED patient with laceration of 4th and 5th right fingers (sutured by ED physician); found to have Hgb/Hct 6.7/20 (down from 8.9/25.9 on 3/8/17).  Head CT with no acute hemorrhage.  Right shoulder x-ray with comminuted  fracture of the right humerus with intra-articular extension.  Minimally displaced fracture of the base of the distal phalanx of the 5th digit noted on right hand x-ray.  Orthopedic surgeon Dr. Saravia was consulted by ED physician.  Patient given tylenol 650 mg and NS infusion at 125 mls/hr in ED.  Admitted to Hospital Medicine service with symptomatic anemia.      Hospital Course:  Upon arrival to ED patient with laceration of 4th and 5th right fingers (sutured by ED physician); found to have Hgb/Hct 6.7/20 (down from 8.9/25.9 on 3/8/17).  Head CT with no acute hemorrhage.  Right shoulder x-ray with comminuted fracture of the right humerus with intra-articular extension.  Minimally displaced fracture of the base of the distal phalanx of the 5th digit noted on right hand x-ray.  Orthopedic surgeon Dr. Saravia was consulted by ED physician.  Patient given tylenol 650 mg and NS infusion at 125 mls/hr in ED.  Admitted to Hospital Medicine service with symptomatic anemia. Hemoglobin responded well to the 2 units of PRBCs transfused. Dr. Saravia recommended non-operative management of the fracture with a sling. PT/OT evaluations were performed and they recommended SNF placement.     Interval History: Says that she is feeling better today. Pain is controlled with tylenol. Eating well. Working with PT/OT.     Review of Systems   Constitutional: Negative for fever.   Respiratory: Negative for cough and shortness of breath.    Gastrointestinal: Negative for nausea and vomiting.     Objective:     Vital Signs (Most Recent):  Temp: 98.4 °F (36.9 °C) (03/16/17 1200)  Pulse: 62 (03/16/17 1200)  Resp: 18 (03/16/17 1200)  BP: (!) 168/67 (03/16/17 1200)  SpO2: 97 % (03/16/17 1247) Vital Signs (24h Range):  Temp:  [98.2 °F (36.8 °C)-99.3 °F (37.4 °C)] 98.4 °F (36.9 °C)  Pulse:  [60-73] 62  Resp:  [18-20] 18  SpO2:  [95 %-97 %] 97 %  BP: (127-175)/(59-77) 168/67     Weight: 63.5 kg (140 lb)  Body mass index is 30.3  kg/(m^2).    Intake/Output Summary (Last 24 hours) at 03/16/17 1633  Last data filed at 03/16/17 1329   Gross per 24 hour   Intake              915 ml   Output             2100 ml   Net            -1185 ml      Physical Exam   Constitutional: She is oriented to person, place, and time. She appears well-developed and well-nourished. No distress.   HENT:   Head: Normocephalic and atraumatic.   Musculoskeletal: She exhibits no edema.   Neurological: She is alert and oriented to person, place, and time.   Skin: Skin is warm and dry.   Psychiatric: She has a normal mood and affect. Her behavior is normal.   Nursing note and vitals reviewed.      Significant Labs:   CBC:   Recent Labs  Lab 03/15/17  0432 03/16/17  0815   WBC 6.00 5.13   HGB 9.4* 9.2*   HCT 27.8* 27.0*   PLT 66* 64*     CMP:   Recent Labs  Lab 03/15/17  0432 03/16/17  0815   * 128*   K 3.6 3.6   CL 96 92*   CO2 24 28   * 121*   BUN 12 10   CREATININE 0.7 0.6   CALCIUM 8.2* 8.5*   ANIONGAP 8 8   EGFRNONAA >60 >60       Significant Imaging: I have reviewed all pertinent imaging results/findings within the past 24 hours.    Assessment/Plan:      * Symptomatic anemia  Anemia due to antineoplastic chemotherapy  Diffuse large B cell lymphoma  Last chemotherapy on 1/17/17. Patient received transfusion of 2 units pRBCs during 1/26/17-1/27/17 hospitalization. Hgb stable at 9.2 today. Will check CBC weekly per Dr. Arredondo.     Renovascular hypertension  SBP ranged 123 to 175. Continue carvedilol 25 mg BID. Restart lisinopril at 20 mg daily. Continue to monitor.    Nonintractable absence epilepsy without status epilepticus  Continue home dose phenobarbital and carbamazepine.  Seizure precautions.      DLBCL (diffuse large B cell lymphoma)  In remission. Appreciate Dr. Arredondo's assistance.       Hypomagnesemia  Will start on mag oxide.     Closed comminuted fracture of right humerus  S/p fall. Appreciate evaluation of Orthopedic surgeon Dr. Kevin Saravia and  recommends non-operative management. Continue NWB status with RUE in sling. PT/OT recommend SNF. PRN pain medication.      NSTEMI (non-ST elevated myocardial infarction)  Troponin peaked at 0.153.  Allergy to Aspirin.  Holding plavix because of the thrombocytopenia. Monitor on telemetry.  Appreciate cardiology assistance.      VTE Risk Mitigation         Ordered     High Risk of VTE  Once      03/14/17 0253     Place PATRICIO hose  Until discontinued      03/14/17 0253     Place sequential compression device  Until discontinued      03/14/17 0253          Ryan Espinosa MD  Department of Hospital Medicine   [facility]

## 2017-03-16 NOTE — ASSESSMENT & PLAN NOTE
Troponin peaked at 0.153.  Allergy to Aspirin.  Holding plavix because of the thrombocytopenia. Monitor on telemetry.  Appreciate cardiology assistance.

## 2017-03-16 NOTE — ASSESSMENT & PLAN NOTE
SBP ranged 112 to 186. Continue to hold lisinopril and carvedilol. Stop IVFs.  Continue to monitor.  Resume medications when appropriate.

## 2017-03-16 NOTE — PLAN OF CARE
Problem: Patient Care Overview  Goal: Plan of Care Review  Outcome: Ongoing (interventions implemented as appropriate)  Plan of care reviewed with the patient and family. Verbalized clear understanding. Bed alarm set. Bed in lowest position. Call light within reach. Instructed to call for assistance when getting out of the bed. Complaints of R shoulder pain. NSR HR between 60's-70's on telemetry monitor. No report of SOB or lightheadedness. Will continue to monitor.

## 2017-03-16 NOTE — PLAN OF CARE
Problem: Patient Care Overview  Goal: Plan of Care Review  Outcome: Ongoing (interventions implemented as appropriate)  Pt received on RA.  SPO2 97 %.  Pt in no apparent respiratory distress.  Will continue to monitor.

## 2017-03-16 NOTE — PLAN OF CARE
Problem: Physical Therapy Goal  Goal: Physical Therapy Goal  Goals to be met by: 3/29/2017     Patient will increase functional independence with mobility by performin. Supine to sit with Stand-by Assistance  2. Sit to stand transfer with Supervision  3. Gait x 50 feet with Supervision using Single-point Cane .    Outcome: Ongoing (interventions implemented as appropriate)  Recommend SNF  Stronger with increase functional mobility today

## 2017-03-16 NOTE — SUBJECTIVE & OBJECTIVE
Oncology Treatment Plan:   OP R-CHOP    Medications:  Continuous Infusions:   Scheduled Meds:   carbamazepine  200 mg Oral Nightly    carvedilol  25 mg Oral BID    levothyroxine  100 mcg Oral Before breakfast    phenobarbital  64.8 mg Oral QHS     PRN Meds:sodium chloride, acetaminophen, ondansetron, ramelteon     Review of patient's allergies indicates:   Allergen Reactions    Adhesive Itching and Blisters    Penicillins Anaphylaxis    Tramadol Hives    Avelox [moxifloxacin] Rash     Facial and arm itching and redness. Pt states throat closes when given.    Amoxil [amoxicillin]     Aspridrox [aspirin, buffered]     Codeine Other (See Comments)     Throat swelling    Keflex [cephalexin]     Norvasc [amlodipine]     Red dye Hives    Sulfa (sulfonamide antibiotics)     Tylenol [acetaminophen]      Has reaction to Tylenol with red dye and unable to take Extra Strength Tylenol/ CAN ONLY TOLERATE REG STRENGTH TYLENOL    Vicks vaporub [camphor-eucalyptus oil-menthol]         Past Medical History:   Diagnosis Date    Cancer     colon    Hypertension     Petit mal epilepsy 1952    Scoliosis of lumbar spine     Seizures     Unspecified hypothyroidism      Past Surgical History:   Procedure Laterality Date    APPENDECTOMY      BACK SURGERY  1988    vertebral fracture    BACK SURGERY  02/2013    lumbar L2-5    CATARACT EXTRACTION, BILATERAL Bilateral     CHOLECYSTECTOMY      open    COLON SURGERY      EYE SURGERY Bilateral     cataract removal with lens implant    HYSTERECTOMY      PORTACATH PLACEMENT Right 09/2016    TONSILLECTOMY      VAGINAL HYSTERECTOMY W/ ANTERIOR AND POSTERIOR VAGINAL REPAIR       Family History     Problem Relation (Age of Onset)    Heart attack Father    Hypertension Father    Pancreatic cancer Mother        Social History Main Topics    Smoking status: Never Smoker    Smokeless tobacco: Not on file    Alcohol use No    Drug use: No    Sexual activity: Not on file        Review of Systems   Constitutional: Positive for fatigue. Negative for fever and unexpected weight change.   HENT: Negative for mouth sores and nosebleeds.    Eyes: Negative for pain.   Respiratory: Negative for wheezing and stridor.    Gastrointestinal: Negative for abdominal pain and blood in stool.   Musculoskeletal: Positive for arthralgias. Negative for gait problem.   Neurological: Negative for seizures and headaches.   Hematological: Negative for adenopathy. Does not bruise/bleed easily.   Psychiatric/Behavioral: The patient is nervous/anxious.      Objective:     Vital Signs (Most Recent):  Temp: 98.4 °F (36.9 °C) (03/16/17 0505)  Pulse: 66 (03/16/17 0515)  Resp: 20 (03/16/17 0505)  BP: (!) 172/76 (03/16/17 0515)  SpO2: 97 % (03/16/17 0357) Vital Signs (24h Range):  Temp:  [98.3 °F (36.8 °C)-99.3 °F (37.4 °C)] 98.4 °F (36.9 °C)  Pulse:  [65-73] 66  Resp:  [20] 20  SpO2:  [96 %-97 %] 97 %  BP: (123-175)/(58-77) 172/76     Weight: 63.5 kg (140 lb)  Body mass index is 30.3 kg/(m^2).  Body surface area is 1.6 meters squared.      Intake/Output Summary (Last 24 hours) at 03/16/17 0735  Last data filed at 03/16/17 0500   Gross per 24 hour   Intake             1090 ml   Output             2650 ml   Net            -1560 ml       Physical Exam   Constitutional: She is oriented to person, place, and time. She appears well-developed and well-nourished. No distress.   HENT:   Mouth/Throat: Oropharynx is clear and moist and mucous membranes are normal. Mucous membranes are not pale. No oropharyngeal exudate.   Eyes: Conjunctivae are normal. No scleral icterus.   Neck: Normal range of motion. Neck supple. No thyroid mass (Thyroid area is non-tender) and no thyromegaly present.   Cardiovascular: Normal rate and regular rhythm.  Exam reveals no friction rub.    Pulmonary/Chest: Effort normal and breath sounds normal. No accessory muscle usage or stridor. No respiratory distress. She has no wheezes.   Abdominal: She  exhibits no ascites and no mass. There is no hepatosplenomegaly. There is no tenderness.   Musculoskeletal: She exhibits no edema.   No varicosities noted.   Lymphadenopathy:     She has no cervical adenopathy.        Right: No supraclavicular adenopathy present.        Left: No supraclavicular adenopathy present.   Neurological: She is alert and oriented to person, place, and time.   Skin:        Psychiatric: She has a normal mood and affect. Judgment and thought content normal. Cognition and memory are normal. She exhibits normal recent memory and normal remote memory.       Significant Labs:   All pertinent labs from the last 24 hours have been reviewed.    Diagnostic Results:  I have reviewed and interpreted all pertinent imaging results/findings within the past 24 hours.

## 2017-03-16 NOTE — NURSING
Patient blood pressure was assessed manually as 172/76, pulse 65. Dr. Guidry notified, instructed nurse to administer 0900 dose of coreg now.

## 2017-03-16 NOTE — PLAN OF CARE
"Problem: Patient Care Overview  Goal: Plan of Care Review  Outcome: Ongoing (interventions implemented as appropriate)  Plan of care reviewed with patient. TB skin test to be read today at 16:26. Seizure and fall precautions maintained. Neuro checks Q4. Patient complained of pain to right shoulder. Nurse administered  650 mg tylenol per order. When reassessed patient stated she felt "much better".  NSR on monitor, HR 60s-70s. Call bell within reach, side rails X2, bedside commode near. Will continue to monitor.       "

## 2017-03-16 NOTE — ASSESSMENT & PLAN NOTE
S/p fall. Appreciate evaluation of Orthopedic surgeon Dr. Kevin Saravia and recommends non-operative management. Continue NWB status with RUE in sling. PT/OT recommend SNF. PRN pain medication.

## 2017-03-16 NOTE — ASSESSMENT & PLAN NOTE
Stage III diffuse large B-cell lymphoma.  Completed 6 cycles of chemotherapy in January.  Will continue to monitor.  No evidence of recurrent disease based on recent clinical profile.

## 2017-03-16 NOTE — PLAN OF CARE
Problem: Occupational Therapy Goal  Goal: Occupational Therapy Goal  Goals to be met by: 3/25/2017    Patient will increase functional independence with ADLs by performing:    Feeding with Modified [place].  UE Dressing with Moderate Assistance.  LE Dressing with Moderate Assistance.  Grooming while standing with Supervision.  Toileting from bedside commode with Contact Guard Assistance for hygiene and clothing management.   Bathing from edge of bed with Minimal Assistance.  Stand pivot transfers with Supervision.  Toilet transfer to bedside commode with Supervision.  Upper extremity exercise program x10 reps per handout, with supervision for R fingers except 5th digit and hand.  Increase r hand AROM wfl except 5th digit .   Outcome: Ongoing (interventions implemented as appropriate)  Annette Garcia is a 81 y.o. female with a medical diagnosis of Symptomatic anemia and presents with R humerus and 5th digit fracture, pain and decreased overall strength, endurance and [place] with ADL's and fx mobility. Pt motivated to work with therapy and eager to return to PLOF. Pt progressing towards goals. Continue OT services to address functional goals  FESTUS Saleh/LUIS FELIPE

## 2017-03-16 NOTE — ASSESSMENT & PLAN NOTE
This is also likely secondary to chemotherapy that she received in January.  Her bone marrow does not appear to have recovered yet.  Will continue to monitor counts.  If cytopenias persist will consider repeat bone marrow aspiration/biopsy in May.

## 2017-03-16 NOTE — ASSESSMENT & PLAN NOTE
Anemia due to antineoplastic chemotherapy  Diffuse large B cell lymphoma  Last chemotherapy on 1/17/17.  Patient received transfusion of 2 units pRBCs during 1/26/17-1/27/17 hospitalization. Hgb up to 9.4 today after completion of transfusion yesterday. Consulted Dr. Arredondo.

## 2017-03-16 NOTE — PROGRESS NOTES
[facility] Hospital Medicine  Progress Note    Patient Name: Annette Garcia  MRN: [id number]  Patient Class: IP- Inpatient   Admission Date: 3/13/2017  Length of Stay: 1 days  Attending Physician: Ryan Espinosa MD  Primary Care Provider: Jose Valles MD        Subjective:     Principal Problem:Symptomatic anemia    HPI:  Annette Garcia is a 80 yo  woman with petit mal epilepsy since age 19 (last seizure age 26), post-traumatic lumbar spondylosis, sacroiliac degenerative joint disease, difficult to control renovascular hypertension (s/p renal angiogram on 3/8/17), hypothyroidism, diffuse large B cell lymphoma (diagnosed 8/23/16, last chemo 1/17/17). She lives in [place], [place] with her , who has lung cancer. She has 7 children. She uses a straight cane to ambulate. Her primary care physician is Dr. Jose Torres. Her oncologist is Dr. Tyson Arredondo. Her cardiologist is Dr. Timmy Simmons.    Patient presented to [facility] ED on 3/13/17 after being found on floor by her daughter after falling.  Patient reports that she was attempting to walk outside at approximately 3:30 pm when she began to feel weak and dizzy.  She states that she fell forward and hit head and shoulder on door and then proceeded to break her fall to the floor by using her hands. She denies loss of consciousness, but reports that she was too weak to move or call for help.  She was found by her daughter at approximately 5 pm.  She denies headache, nausea, vomiting, paresthesias, chest pain, or shortness of breath prior to fall.  Denies hematemesis, melena, hematochezia.  Patient reports that she has been experiencing dizziness and weakness x 3-4 weeks.    Upon arrival to ED patient with laceration of 4th and 5th right fingers (sutured by ED physician); found to have Hgb/Hct 6.7/20 (down from 8.9/25.9 on 3/8/17).  Head CT with no acute hemorrhage.  Right shoulder x-ray with comminuted  fracture of the right humerus with intra-articular extension.  Minimally displaced fracture of the base of the distal phalanx of the 5th digit noted on right hand x-ray.  Orthopedic surgeon Dr. Saravia was consulted by ED physician.  Patient given tylenol 650 mg and NS infusion at 125 mls/hr in ED.  Admitted to Hospital Medicine service with symptomatic anemia.      Hospital Course:  Upon arrival to ED patient with laceration of 4th and 5th right fingers (sutured by ED physician); found to have Hgb/Hct 6.7/20 (down from 8.9/25.9 on 3/8/17).  Head CT with no acute hemorrhage.  Right shoulder x-ray with comminuted fracture of the right humerus with intra-articular extension.  Minimally displaced fracture of the base of the distal phalanx of the 5th digit noted on right hand x-ray.  Orthopedic surgeon Dr. Saravia was consulted by ED physician.  Patient given tylenol 650 mg and NS infusion at 125 mls/hr in ED.  Admitted to Hospital Medicine service with symptomatic anemia. Hemoglobin responded well to the 2 units of PRBCs transfused. Dr. Saravia recommended non-operative management of the fracture with a sling. PT/OT evaluations were performed and they recommended SNF placement.     Interval History: Feeling a little better today. Has been getting up to the BSC. Having some pain in the right arm.     Review of Systems   Constitutional: Negative for fever.   Respiratory: Negative for cough and shortness of breath.    Cardiovascular: Negative for chest pain and palpitations.   Gastrointestinal: Negative for nausea and vomiting.     Objective:     Vital Signs (Most Recent):  Temp: (P) 99.1 °F (37.3 °C) (03/15/17 2124)  Pulse: (P) 73 (03/15/17 2124)  Resp: (P) 20 (03/15/17 2124)  BP: (!) 148/65 (03/15/17 1600)  SpO2: 97 % (03/15/17 2036) Vital Signs (24h Range):  Temp:  [98.3 °F (36.8 °C)-99.1 °F (37.3 °C)] (P) 99.1 °F (37.3 °C)  Pulse:  [67-76] (P) 73  Resp:  [20] (P) 20  SpO2:  [96 %-97 %] 97 %  BP: (112-148)/(56-65) 148/79      Weight: 63.5 kg (140 lb)  Body mass index is 30.3 kg/(m^2).    Intake/Output Summary (Last 24 hours) at 03/15/17 2235  Last data filed at 03/15/17 1831   Gross per 24 hour   Intake              840 ml   Output             1850 ml   Net            -1010 ml      Physical Exam   Constitutional: She is oriented to person, place, and time. She appears well-developed and well-nourished. No distress.   Cardiovascular: Normal rate and regular rhythm.    No murmur heard.  Pulmonary/Chest: Effort normal and breath sounds normal. No respiratory distress. She has no wheezes.   Abdominal: Soft. Bowel sounds are normal. She exhibits no distension. There is no tenderness.   Musculoskeletal: She exhibits no edema.   Neurological: She is alert and oriented to person, place, and time.   Skin: Skin is warm and dry.   Psychiatric: She has a normal mood and affect. Her behavior is normal.   Nursing note and vitals reviewed.      Significant Labs:   CBC:   Recent Labs  Lab 03/13/17 2315 03/14/17 0454 03/15/17  0432   WBC 7.17 5.96 6.00   HGB 6.7* 7.5* 9.4*   HCT 20.0* 21.2* 27.8*   PLT 82* 86* 66*     CMP:   Recent Labs  Lab 03/13/17 2315 03/14/17 0454 03/15/17  0432   * 125* 128*   K 3.6 3.6 3.6   CL 94* 92* 96   CO2 26 25 24   * 124* 113*   BUN 16 18 12   CREATININE 0.7 0.8 0.7   CALCIUM 8.3* 8.4* 8.2*   PROT 5.4*  --   --    ALBUMIN 3.3*  --   --    BILITOT 0.4  --   --    ALKPHOS 59  --   --    AST 17  --   --    ALT 12  --   --    ANIONGAP 7* 8 8   EGFRNONAA >60 >60 >60       Significant Imaging: I have reviewed all pertinent imaging results/findings within the past 24 hours.    Assessment/Plan:      * Symptomatic anemia  Anemia due to antineoplastic chemotherapy  Diffuse large B cell lymphoma  Last chemotherapy on 1/17/17.  Patient received transfusion of 2 units pRBCs during 1/26/17-1/27/17 hospitalization. Hgb up to 9.4 today after completion of transfusion yesterday. Consulted Dr. Arredondo.     Renovascular  hypertension  SBP ranged 112 to 186. Continue to hold lisinopril and carvedilol. Stop IVFs.  Continue to monitor.  Resume medications when appropriate.      Nonintractable absence epilepsy without status epilepticus  Continue home dose phenobarbital and carbamazepine.  Seizure precautions.      DLBCL (diffuse large B cell lymphoma)  In remission. Consulted Dr. Arredondo.       Chronic hyponatremia  Sodium 128 today. Stable. Continue to monitor.      Bilateral renal artery stenosis  S/p renal angiogram on 3/8/17. Cardiology planning for staged PCI in the future.       Hypomagnesemia  Replace.      Closed comminuted fracture of right humerus  S/p fall. Appreciate evaluation of Orthopedic surgeon Dr. Kevin Saravia and recommends non-operative management. Continue NWB status with RUE in sling. PT/OT recommend SNF. PRN pain medication.      NSTEMI (non-ST elevated myocardial infarction)  Troponin peaked at 0.153.  Allergy to Aspirin.  Holding plavix because of the thrombocytopenis. Holding carvedilol and lisinopril due to hypotension.  Monitor on telemetry.  Appreciate cardiology assistance.      VTE Risk Mitigation         Ordered     High Risk of VTE  Once      03/14/17 0253     Place PATRICIO hose  Until discontinued      03/14/17 0253     Place sequential compression device  Until discontinued      03/14/17 0253          Ryan Espinosa MD  Department of Hospital Medicine   [facility]

## 2017-03-16 NOTE — PT/OT/SLP PROGRESS
"Occupational Therapy  Treatment    Annette Garcia   MRN: [id number]   Admitting Diagnosis: Symptomatic anemia    OT Date of Treatment: 17   OT Start Time: 1203  OT Stop Time: 1227  OT Total Time (min): 24 min    Billable Minutes:  Self Care/Home Management 10 and Therapeutic Activity 14    General Precautions: Standard, fall  Orthopedic Precautions: RUE non weight bearing  Braces:  (UE sling)    Do you have any cultural, spiritual, Temple conflicts, given your current situation?: none    Subjective:  Communicated with RN prior to session.  "Can show me ways to open things with one hand?"    Pain Ratin/10              Pain Rating Post-Intervention: 0/10    Objective:  Patient found with: telemetry, peripheral IV (RUE sling intact)     Functional Mobility:  Bed Mobility:  Rolling/Turning to Left: Contact guard assistance  Scooting/Bridging: Contact Guard Assistance  Supine to Sit: Minimum Assistance    Transfers:   Sit <> Stand Assistance: Contact Guard Assistance  Sit <> Stand Assistive Device: No Assistive Device  Bed <> Chair Technique: Stand Pivot  Bed <> Chair Transfer Assistance: Contact Guard Assistance  Bed <> Chair Assistive Device: No Assistive Device    Functional Ambulation: Pt took 5 steps to bedside chair with CGA and HHA.     Activities of Daily Living:  Feeding Level of Assistance: Set-up Assistance (pt using R thumb and index finger as a stabilizer to opem packages.)     LE Dressing Level of Assistance: Minimum assistance (donning left sock)    Grooming Position: Seated (uses R thumb and index finge as a stabilizer to hold toothpaste whule opening with L hand)  Grooming Level of Assistance: Minimum assistance                    Balance:   Static Sit: GOOD: Takes MODERATE challenges from all directions  Dynamic Sit: GOOD: Maintains balance through MODERATE excursions of active trunk movement  Static Stand: FAIR: Maintains without assist but unable to take challenges  Dynamic stand: FAIR: " Needs CONTACT GUARD during gait    Therapeutic Activities and Exercises:  Pt educated on techniques to assist with one handed ADL's and equipment that may be beneficial to assist with increasing [place] and safety with ADL's.    AM-PAC 6 CLICK ADL   How much help from another person does this patient currently need?   1 = Unable, Total/Dependent Assistance  2 = A lot, Maximum/Moderate Assistance  3 = A little, Minimum/Contact Guard/Supervision  4 = None, Modified [place]/Independent    Putting on and taking off regular lower body clothing? : 2  Bathing (including washing, rinsing, drying)?: 2  Toileting, which includes using toilet, bedpan, or urinal? : 2  Putting on and taking off regular upper body clothing?: 2  Taking care of personal grooming such as brushing teeth?: 3  Eating meals?: 3  Total Score: 14     AM-PAC Raw Score CMS G-Code Modifier Level of Impairment Assistance   6 % Total / Unable   7 - 9 CM 80 - 100% Maximal Assist   10 - 14 CL 60 - 80% Moderate Assist   15 - 19 CK 40 - 60% Moderate Assist   20 - 22 CJ 20 - 40% Minimal Assist   23 CI 1-20% SBA / CGA   24 CH 0% Independent/ Mod I     Patient left up in chair with all lines intact, call button in reach, RN notified and family present    ASSESSMENT:  Annette Garcia is a 81 y.o. female with a medical diagnosis of Symptomatic anemia and presents with R humerus and 5th digit fracture, pain and decreased overall strength, endurance and [place] with ADL's and fx mobility. Pt motivated to work with therapy and eager to return to [facility]. Pt progressing towards goals. Continue OT services to address functional goals        Rehab identified problem list/impairments: Rehab identified problem list/impairments: weakness, impaired endurance, impaired self care skills, impaired functional mobilty, gait instability, impaired balance, decreased coordination, decreased upper extremity function, decreased safety awareness, pain, decreased ROM,  impaired coordination, impaired fine motor, orthopedic precautions    Rehab potential is good.    Activity tolerance: Good    Discharge recommendations:       Barriers to discharge:      Equipment recommendations:       GOALS:   Occupational Therapy Goals        Problem: Occupational Therapy Goal    Goal Priority Disciplines Outcome Interventions   Occupational Therapy Goal     OT, PT/OT Ongoing (interventions implemented as appropriate)    Description:  Goals to be met by: 3/25/2017    Patient will increase functional independence with ADLs by performing:    Feeding with Modified [place].  UE Dressing with Moderate Assistance.  LE Dressing with Moderate Assistance.  Grooming while standing with Supervision.  Toileting from bedside commode with Contact Guard Assistance for hygiene and clothing management.   Bathing from  edge of bed with Minimal Assistance.  Stand pivot transfers with Supervision.  Toilet transfer to bedside commode with Supervision.  Upper extremity exercise program x10 reps per handout, with supervision for R fingers except 5th digit and hand.  Increase r hand AROM wfl except 5th digit .                Plan:  Patient to be seen 5 x/week to address the above listed problems via self-care/home management, therapeutic exercises, therapeutic activities  Plan of Care expires: 04/15/17  Plan of Care reviewed with: patient         MARY Saleh  03/16/2017

## 2017-03-16 NOTE — PROGRESS NOTES
The Sw spoke to Katey(CHUCK) at [facility] and she states the pt hasn't been medically accepted yet b/c someone will have to come and assess the pt. She states she will send someone today. The Sw informed her thew pt's ready for d/c tomorrow. She will give a determination once the assessment is complete.     4:22pm The Sw received a call from Teresita stating her DON(Katey)will be out in the morning at 7:30am to assess the pt. The Sw informed her the pt will be ready for d/c tomorrow.

## 2017-03-16 NOTE — SUBJECTIVE & OBJECTIVE
Interval History: Feeling a little better today. Has been getting up to the [facility]. Having some pain in the right arm.     Review of Systems   Constitutional: Negative for fever.   Respiratory: Negative for cough and shortness of breath.    Cardiovascular: Negative for chest pain and palpitations.   Gastrointestinal: Negative for nausea and vomiting.     Objective:     Vital Signs (Most Recent):  Temp: (P) 99.1 °F (37.3 °C) (03/15/17 2124)  Pulse: (P) 73 (03/15/17 2124)  Resp: (P) 20 (03/15/17 2124)  BP: (!) 148/65 (03/15/17 1600)  SpO2: 97 % (03/15/17 2036) Vital Signs (24h Range):  Temp:  [98.3 °F (36.8 °C)-99.1 °F (37.3 °C)] (P) 99.1 °F (37.3 °C)  Pulse:  [67-76] (P) 73  Resp:  [20] (P) 20  SpO2:  [96 %-97 %] 97 %  BP: (112-148)/(56-65) 148/65     Weight: 63.5 kg (140 lb)  Body mass index is 30.3 kg/(m^2).    Intake/Output Summary (Last 24 hours) at 03/15/17 2235  Last data filed at 03/15/17 1831   Gross per 24 hour   Intake              840 ml   Output             1850 ml   Net            -1010 ml      Physical Exam   Constitutional: She is oriented to person, place, and time. She appears well-developed and well-nourished. No distress.   Cardiovascular: Normal rate and regular rhythm.    No murmur heard.  Pulmonary/Chest: Effort normal and breath sounds normal. No respiratory distress. She has no wheezes.   Abdominal: Soft. Bowel sounds are normal. She exhibits no distension. There is no tenderness.   Musculoskeletal: She exhibits no edema.   Neurological: She is alert and oriented to person, place, and time.   Skin: Skin is warm and dry.   Psychiatric: She has a normal mood and affect. Her behavior is normal.   Nursing note and vitals reviewed.      Significant Labs:   CBC:   Recent Labs  Lab 03/13/17  2315 03/14/17  0454 03/15/17  0432   WBC 7.17 5.96 6.00   HGB 6.7* 7.5* 9.4*   HCT 20.0* 21.2* 27.8*   PLT 82* 86* 66*     CMP:   Recent Labs  Lab 03/13/17  2315 03/14/17  0454 03/15/17  0432   * 125* 128*   K  3.6 3.6 3.6   CL 94* 92* 96   CO2 26 25 24   * 124* 113*   BUN 16 18 12   CREATININE 0.7 0.8 0.7   CALCIUM 8.3* 8.4* 8.2*   PROT 5.4*  --   --    ALBUMIN 3.3*  --   --    BILITOT 0.4  --   --    ALKPHOS 59  --   --    AST 17  --   --    ALT 12  --   --    ANIONGAP 7* 8 8   EGFRNONAA >60 >60 >60       Significant Imaging: I have reviewed all pertinent imaging results/findings within the past 24 hours.

## 2017-03-16 NOTE — PT/OT/SLP PROGRESS
Physical Therapy  Treatment    Annette Garcia   MRN: [id number]   Admitting Diagnosis: Symptomatic anemia    PT Received On: 17  PT Start Time: 1120     PT Stop Time: 1140    PT Total Time (min): 20 min       Billable Minutes:  Gait [id number]    Treatment Type: Treatment  PT/PTA: PT             General Precautions: Standard, fall  Orthopedic Precautions: RUE non weight bearing   Braces: UE Sling    Do you have any cultural, spiritual, Worship conflicts, given your current situation?: none    Subjective:  Communicated with primary nurse  prior to session.      Pain Ratin/10  Location - Side: Right  Location - Orientation: generalized  Location: shoulder  Pain Addressed: Pre-medicate for activity  Pain Rating Post-Intervention: 4/10    Objective:   Patient found with: telemetry (shoulder sling)    Functional Mobility:  Bed Mobility:   Supine to Sit: Minimum Assistance  Sit to Supine: Moderate Assistance    Transfers:  Sit <> Stand Assistance: Contact Guard Assistance, Minimum Assistance  Sit <> Stand Assistive Device: No Assistive Device (HHA +1)    Gait:   Gait Distance: 30 x 2 with rest between trips  Assistance 1: Minimum assistance  Gait Assistive Device: Hand held assist  Gait Pattern: reciprocal  Gait Deviation(s): decreased alexa    Stairs:  na    Balance:   Static Sit: FAIR+: Able to take MINIMAL challenges from all directions  Dynamic Sit: FAIR+: Maintains balance through MINIMAL excursions of active trunk motion  Static Stand: FAIR: Maintains without assist but unable to take challenges  Dynamic stand: FAIR: Needs CONTACT GUARD during gait to assist HH       Therapeutic Activities and Exercises:  Reviewed ankle pumps heel slides and hip abd/add     AM-PAC 6 CLICK MOBILITY  How much help from another person does this patient currently need?   1 = Unable, Total/Dependent Assistance  2 = A lot, Maximum/Moderate Assistance  3 = A little, Minimum/Contact Guard/Supervision  4 = None, Modified  [place]/Independent    Turning over in bed (including adjusting bedclothes, sheets and blankets)?: 3  Sitting down on and standing up from a chair with arms (e.g., wheelchair, bedside commode, etc.): 3  Moving from lying on back to sitting on the side of the bed?: 3  Moving to and from a bed to a chair (including a wheelchair)?: 3  Need to walk in hospital room?: 3  Climbing 3-5 steps with a railing?: 3  Total Score: 18    AM-PAC Raw Score CMS G-Code Modifier Level of Impairment Assistance   6 % Total / Unable   7 - 9 CM 80 - 100% Maximal Assist   10 - 14 CL 60 - 80% Moderate Assist   15 - 19 CK 40 - 60% Moderate Assist   20 - 22 CJ 20 - 40% Minimal Assist   23 CI 1-20% SBA / CGA   24 CH 0% Independent/ Mod I     Patient left HOB elevated with call button in reach and family present.    Assessment:  Annette Garcia is a 81 y.o. female with a medical diagnosis of Symptomatic anemia and presents with weakness orthr precautions, decrease ROM, gait disturbance and decline from PLOF. Patient should benefit from gait training and strengthening.    Rehab identified problem list/impairments: Rehab identified problem list/impairments: weakness, impaired endurance, impaired balance, gait instability, decreased lower extremity function, decreased upper extremity function, impaired functional mobilty, impaired self care skills, pain, decreased ROM    Rehab potential is good.    Activity tolerance: Fair    Discharge recommendations: Discharge Facility/Level Of Care Needs: nursing facility, skilled     Barriers to discharge:      Equipment recommendations: Equipment Needed After Discharge: none     GOALS:   Physical Therapy Goals        Problem: Physical Therapy Goal    Goal Priority Disciplines Outcome Goal Variances Interventions   Physical Therapy Goal     PT/OT, PT Ongoing (interventions implemented as appropriate)     Description:  Goals to be met by: 3/29/2017     Patient will increase functional independence  with mobility by performin. Supine to sit with Stand-by Assistance  2. Sit to stand transfer with Supervision  3. Gait  x 50 feet with Supervision using Single-point Cane .                 PLAN:    Patient to be seen 5 x/week  to address the above listed problems via gait training, therapeutic activities, therapeutic exercises  Plan of Care expires: 17  Plan of Care reviewed with: patient, family         Hari SHREE Ramos, PT  2017

## 2017-03-16 NOTE — CONSULTS
[facility]  Hematology/Oncology  Consult Note    Patient Name: Annette Garcia  MRN: [id number]  Admission Date: 3/13/2017  Hospital Length of Stay: 2 days  Code Status: Full Code   Attending Provider: Ryan Espinosa MD  Consulting Provider: Tyson Arredondo MD  Primary Care Physician: Jose Valles MD  Principal Problem:Symptomatic anemia    Consults  Subjective:     HPI:  Patient well known to me.  81-year-old female with stage III diffuse large B-cell lymphoma status post R CHOP chemotherapy, last/6 cycle administered on January 17, 2017.  She then got admitted and received blood for anemia secondary to chemotherapy around January 26.  She is now admitted after she sustained a fall at home and broke her right humerus.  Hemoglobin on admission 6.7, she received 2 units of blood and her current hemoglobin is 9.4.  Humeral fracture being managed conservatively    Oncology Treatment Plan:   OP R-CHOP    Medications:  Continuous Infusions:   Scheduled Meds:   carbamazepine  200 mg Oral Nightly    carvedilol  25 mg Oral BID    levothyroxine  100 mcg Oral Before breakfast    phenobarbital  64.8 mg Oral QHS     PRN Meds:sodium chloride, acetaminophen, ondansetron, ramelteon     Review of patient's allergies indicates:   Allergen Reactions    Adhesive Itching and Blisters    Penicillins Anaphylaxis    Tramadol Hives    Avelox [moxifloxacin] Rash     Facial and arm itching and redness. Pt states throat closes when given.    Amoxil [amoxicillin]     Aspridrox [aspirin, buffered]     Codeine Other (See Comments)     Throat swelling    Keflex [cephalexin]     Norvasc [amlodipine]     Red dye Hives    Sulfa (sulfonamide antibiotics)     Tylenol [acetaminophen]      Has reaction to Tylenol with red dye and unable to take Extra Strength Tylenol/ CAN ONLY TOLERATE REG STRENGTH TYLENOL    Vicks vaporub [camphor-eucalyptus oil-menthol]         Past Medical History:   Diagnosis Date     Cancer     colon    Hypertension     Petit mal epilepsy 1954    Scoliosis of lumbar spine     Seizures     Unspecified hypothyroidism      Past Surgical History:   Procedure Laterality Date    APPENDECTOMY      BACK SURGERY  1988    vertebral fracture    BACK SURGERY  02/2013    lumbar L2-5    CATARACT EXTRACTION, BILATERAL Bilateral     CHOLECYSTECTOMY      open    COLON SURGERY      EYE SURGERY Bilateral     cataract removal with lens implant    HYSTERECTOMY      PORTACATH PLACEMENT Right 09/2016    TONSILLECTOMY      VAGINAL HYSTERECTOMY W/ ANTERIOR AND POSTERIOR VAGINAL REPAIR       Family History     Problem Relation (Age of Onset)    Heart attack Father    Hypertension Father    Pancreatic cancer Mother        Social History Main Topics    Smoking status: Never Smoker    Smokeless tobacco: Not on file    Alcohol use No    Drug use: No    Sexual activity: Not on file       Review of Systems   Constitutional: Positive for fatigue. Negative for fever and unexpected weight change.   HENT: Negative for mouth sores and nosebleeds.    Eyes: Negative for pain.   Respiratory: Negative for wheezing and stridor.    Gastrointestinal: Negative for abdominal pain and blood in stool.   Musculoskeletal: Positive for arthralgias. Negative for gait problem.   Neurological: Negative for seizures and headaches.   Hematological: Negative for adenopathy. Does not bruise/bleed easily.   Psychiatric/Behavioral: The patient is nervous/anxious.      Objective:     Vital Signs (Most Recent):  Temp: 98.4 °F (36.9 °C) (03/16/17 0505)  Pulse: 66 (03/16/17 0515)  Resp: 20 (03/16/17 0505)  BP: (!) 172/76 (03/16/17 0515)  SpO2: 97 % (03/16/17 0357) Vital Signs (24h Range):  Temp:  [98.3 °F (36.8 °C)-99.3 °F (37.4 °C)] 98.4 °F (36.9 °C)  Pulse:  [65-73] 66  Resp:  [20] 20  SpO2:  [96 %-97 %] 97 %  BP: (123-175)/(58-77) 172/76     Weight: 63.5 kg (140 lb)  Body mass index is 30.3 kg/(m^2).  Body surface area is 1.6 meters  squared.      Intake/Output Summary (Last 24 hours) at 03/16/17 0753  Last data filed at 03/16/17 0500   Gross per 24 hour   Intake             1090 ml   Output             2650 ml   Net            -1560 ml       Physical Exam   Constitutional: She is oriented to person, place, and time. She appears well-developed and well-nourished. No distress.   HENT:   Mouth/Throat: Oropharynx is clear and moist and mucous membranes are normal. Mucous membranes are not pale. No oropharyngeal exudate.   Eyes: Conjunctivae are normal. No scleral icterus.   Neck: Normal range of motion. Neck supple. No thyroid mass (Thyroid area is non-tender) and no thyromegaly present.   Cardiovascular: Normal rate and regular rhythm.  Exam reveals no friction rub.    Pulmonary/Chest: Effort normal and breath sounds normal. No accessory muscle usage or stridor. No respiratory distress. She has no wheezes.   Abdominal: She exhibits no ascites and no mass. There is no hepatosplenomegaly. There is no tenderness.   Musculoskeletal: She exhibits no edema.   No varicosities noted.   Lymphadenopathy:     She has no cervical adenopathy.        Right: No supraclavicular adenopathy present.        Left: No supraclavicular adenopathy present.   Neurological: She is alert and oriented to person, place, and time.   Skin:        Psychiatric: She has a normal mood and affect. Judgment and thought content normal. Cognition and memory are normal. She exhibits normal recent memory and normal remote memory.       Significant Labs:   All pertinent labs from the last 24 hours have been reviewed.    Diagnostic Results:  I have reviewed and interpreted all pertinent imaging results/findings within the past 24 hours.    Assessment/Plan:     DLBCL (diffuse large B cell lymphoma)  Stage III diffuse large B-cell lymphoma.  Completed 6 cycles of chemotherapy in January.  Will continue to monitor.  No evidence of recurrent disease based on recent clinical profile.    *  Symptomatic anemia  Anemia secondary to chemotherapy.  She has been transfused 2 units of blood recently.  Current hemoglobin is over 9 grams per deciliter.  Will need weekly CBCs outpatient after hospital discharge.  Please arrange at skilled nursing facility.    Thrombocytopenia, unspecified  This is also likely secondary to chemotherapy that she received in January.  Her bone marrow does not appear to have recovered yet.  Will continue to monitor counts.  If cytopenias persist will consider repeat bone marrow aspiration/biopsy in May.    Closed comminuted fracture of right humerus  Conservative management for now per Dr. Saravia    Bilateral renal artery stenosis  Eventually needs intervention for this.  This is causing her uncontrolled hypertension.      Tyson Arredondo MD  Hematology/Oncology  [facility]

## 2017-03-16 NOTE — SUBJECTIVE & OBJECTIVE
Interval History: Says that she is feeling better today. Pain is controlled with tylenol. Eating well. Working with PT/OT.     Review of Systems   Constitutional: Negative for fever.   Respiratory: Negative for cough and shortness of breath.    Gastrointestinal: Negative for nausea and vomiting.     Objective:     Vital Signs (Most Recent):  Temp: 98.4 °F (36.9 °C) (03/16/17 1200)  Pulse: 62 (03/16/17 1200)  Resp: 18 (03/16/17 1200)  BP: (!) 168/67 (03/16/17 1200)  SpO2: 97 % (03/16/17 1247) Vital Signs (24h Range):  Temp:  [98.2 °F (36.8 °C)-99.3 °F (37.4 °C)] 98.4 °F (36.9 °C)  Pulse:  [60-73] 62  Resp:  [18-20] 18  SpO2:  [95 %-97 %] 97 %  BP: (127-175)/(59-77) 168/67     Weight: 63.5 kg (140 lb)  Body mass index is 30.3 kg/(m^2).    Intake/Output Summary (Last 24 hours) at 03/16/17 1633  Last data filed at 03/16/17 1329   Gross per 24 hour   Intake              915 ml   Output             2100 ml   Net            -1185 ml      Physical Exam   Constitutional: She is oriented to person, place, and time. She appears well-developed and well-nourished. No distress.   HENT:   Head: Normocephalic and atraumatic.   Musculoskeletal: She exhibits no edema.   Neurological: She is alert and oriented to person, place, and time.   Skin: Skin is warm and dry.   Psychiatric: She has a normal mood and affect. Her behavior is normal.   Nursing note and vitals reviewed.      Significant Labs:   CBC:   Recent Labs  Lab 03/15/17  0432 03/16/17  0815   WBC 6.00 5.13   HGB 9.4* 9.2*   HCT 27.8* 27.0*   PLT 66* 64*     CMP:   Recent Labs  Lab 03/15/17  0432 03/16/17  0815   * 128*   K 3.6 3.6   CL 96 92*   CO2 24 28   * 121*   BUN 12 10   CREATININE 0.7 0.6   CALCIUM 8.2* 8.5*   ANIONGAP 8 8   EGFRNONAA >60 >60       Significant Imaging: I have reviewed all pertinent imaging results/findings within the past 24 hours.

## 2017-03-16 NOTE — ASSESSMENT & PLAN NOTE
SBP ranged 123 to 175. Continue carvedilol 25 mg BID. Restart lisinopril at 20 mg daily. Continue to monitor.

## 2017-03-16 NOTE — PLAN OF CARE
TN left message with [facility] to cancel follow-up appointment scheduled. Patient to be discharged tomorrow to SNF. Will continue to follow.    Yasmine Mandel RN  Transition Navigator  [phone]

## 2017-03-16 NOTE — ASSESSMENT & PLAN NOTE
Anemia secondary to chemotherapy.  She has been transfused 2 units of blood recently.  Current hemoglobin is over 9 grams per deciliter.  Will need weekly CBCs outpatient after hospital discharge.  Please arrange at skilled nursing facility.

## 2017-03-16 NOTE — ASSESSMENT & PLAN NOTE
Anemia due to antineoplastic chemotherapy  Diffuse large B cell lymphoma  Last chemotherapy on 1/17/17. Patient received transfusion of 2 units pRBCs during 1/26/17-1/27/17 hospitalization. Hgb stable at 9.2 today. Will check CBC weekly per Dr. Arredondo.

## 2017-03-17 VITALS
SYSTOLIC BLOOD PRESSURE: 142 MMHG | OXYGEN SATURATION: 94 % | HEART RATE: 64 BPM | WEIGHT: 140 LBS | BODY MASS INDEX: 30.2 KG/M2 | HEIGHT: 57 IN | TEMPERATURE: 98 F | RESPIRATION RATE: 18 BRPM | DIASTOLIC BLOOD PRESSURE: 63 MMHG

## 2017-03-17 PROBLEM — E83.42 HYPOMAGNESEMIA: Status: RESOLVED | Noted: 2017-03-14 | Resolved: 2017-03-17

## 2017-03-17 PROBLEM — S42.91XA SHOULDER FRACTURE, RIGHT: Status: RESOLVED | Noted: 2017-03-14 | Resolved: 2017-03-17

## 2017-03-17 PROBLEM — I21.4 NSTEMI (NON-ST ELEVATED MYOCARDIAL INFARCTION): Status: RESOLVED | Noted: 2017-03-14 | Resolved: 2017-03-17

## 2017-03-17 LAB
ANION GAP SERPL CALC-SCNC: 9 MMOL/L
BUN SERPL-MCNC: 13 MG/DL
CALCIUM SERPL-MCNC: 8.9 MG/DL
CHLORIDE SERPL-SCNC: 93 MMOL/L
CO2 SERPL-SCNC: 26 MMOL/L
CREAT SERPL-MCNC: 0.6 MG/DL
EST. GFR  (AFRICAN AMERICAN): >60 ML/MIN/1.73 M^2
EST. GFR  (NON AFRICAN AMERICAN): >60 ML/MIN/1.73 M^2
GLUCOSE SERPL-MCNC: 121 MG/DL
POTASSIUM SERPL-SCNC: 3.9 MMOL/L
SODIUM SERPL-SCNC: 128 MMOL/L

## 2017-03-17 PROCEDURE — 97116 GAIT TRAINING THERAPY: CPT

## 2017-03-17 PROCEDURE — 36415 COLL VENOUS BLD VENIPUNCTURE: CPT

## 2017-03-17 PROCEDURE — 94761 N-INVAS EAR/PLS OXIMETRY MLT: CPT

## 2017-03-17 PROCEDURE — 99231 SBSQ HOSP IP/OBS SF/LOW 25: CPT | Mod: ,,, | Performed by: INTERNAL MEDICINE

## 2017-03-17 PROCEDURE — 25000003 PHARM REV CODE 250: Performed by: NURSE PRACTITIONER

## 2017-03-17 PROCEDURE — 80048 BASIC METABOLIC PNL TOTAL CA: CPT

## 2017-03-17 PROCEDURE — 25000003 PHARM REV CODE 250: Performed by: HOSPITALIST

## 2017-03-17 PROCEDURE — 97535 SELF CARE MNGMENT TRAINING: CPT

## 2017-03-17 PROCEDURE — 97110 THERAPEUTIC EXERCISES: CPT

## 2017-03-17 RX ORDER — LANOLIN ALCOHOL/MO/W.PET/CERES
400 CREAM (GRAM) TOPICAL DAILY
Refills: 0 | COMMUNITY
Start: 2017-03-17 | End: 2017-04-19

## 2017-03-17 RX ORDER — POLYETHYLENE GLYCOL 3350 17 G/17G
17 POWDER, FOR SOLUTION ORAL 2 TIMES DAILY PRN
Refills: 0 | Status: ON HOLD
Start: 2017-03-17 | End: 2020-01-30

## 2017-03-17 RX ORDER — ONDANSETRON 8 MG/1
8 TABLET, ORALLY DISINTEGRATING ORAL EVERY 8 HOURS PRN
Start: 2017-03-17 | End: 2017-06-27 | Stop reason: ALTCHOICE

## 2017-03-17 RX ORDER — ACETAMINOPHEN 325 MG/1
650 TABLET ORAL EVERY 4 HOURS PRN
Refills: 0 | COMMUNITY
Start: 2017-03-17

## 2017-03-17 RX ORDER — FERROUS SULFATE 325(65) MG
325 TABLET, DELAYED RELEASE (ENTERIC COATED) ORAL DAILY
Refills: 0 | COMMUNITY
Start: 2017-03-17 | End: 2017-04-19

## 2017-03-17 RX ADMIN — LISINOPRIL 20 MG: 20 TABLET ORAL at 09:03

## 2017-03-17 RX ADMIN — LEVOTHYROXINE SODIUM 100 MCG: 100 TABLET ORAL at 06:03

## 2017-03-17 RX ADMIN — CARVEDILOL 25 MG: 25 TABLET, FILM COATED ORAL at 09:03

## 2017-03-17 RX ADMIN — ACETAMINOPHEN 650 MG: 325 TABLET ORAL at 12:03

## 2017-03-17 RX ADMIN — ACETAMINOPHEN 650 MG: 325 TABLET ORAL at 09:03

## 2017-03-17 RX ADMIN — FERROUS SULFATE TAB EC 325 MG (65 MG FE EQUIVALENT) 325 MG: 325 (65 FE) TABLET DELAYED RESPONSE at 09:03

## 2017-03-17 RX ADMIN — MAGNESIUM OXIDE TAB 400 MG (241.3 MG ELEMENTAL MG) 400 MG: 400 (241.3 MG) TAB at 09:03

## 2017-03-17 RX ADMIN — HEPARIN 500 UNITS: 100 SYRINGE at 02:03

## 2017-03-17 NOTE — ASSESSMENT & PLAN NOTE
S/p fall. Appreciate Dr. Saravia. Will follow up with him in a week. Continue NWB status with RUE in sling. PT/OT recommend SNF. PRN pain medication.

## 2017-03-17 NOTE — PROGRESS NOTES
The Sw spoke to Teresita and she states the pt's clear to arrive and their w/c  is in route to get the pt to transfer her to their facility. The Sw gave the pt's nurse the contact info to call report and will bring her the copied chart. The Sw spoke to the pt's dtr [phone])and informed and she's in agreement with the d/c plan. She's in route to go sign the admit papers. The pt choice form has been completed and placed in the chart.

## 2017-03-17 NOTE — PT/OT/SLP PROGRESS
"Occupational Therapy  Treatment    Annette Garcia   MRN: [id number]   Admitting Diagnosis: Symptomatic anemia    OT Date of Treatment: 03/17/17   OT Start Time: 1055  OT Stop Time: 1122  OT Total Time (min): 27 min    Billable Minutes:  Self Care/Home Management 24    General Precautions: Standard, fall  Orthopedic Precautions: RUE non weight bearing  Braces: UE Sling    Do you have any cultural, spiritual, Latter-day conflicts, given your current situation?: none    Subjective:  Communicated with nurse, Breana prior to session. "I'm worried about how to do normal things with this arm like this"    Pain Rating:  (no c/o's pain)    Objective:  Patient found with: telemetry (chest port)     Functional Mobility:  N/A    Activities of Daily Living:  N/A    Balance:   Static Sit: GOOD: Takes MODERATE challenges from all directions  Dynamic Sit: GOOD-: Maintains balance through MODERATE excursions of active trunk movement,       Therapeutic Activities and Exercises:  Patient in bedside chair following P.T. Session.   Education and adaptive/renetta techniques discussed for UBD and G/H tasks  Patient able to use RUE (thumb and index) as stabilizer/assist for simple tasks  Patient very modest and concerned over maintaining modesty while in SNF - discussed various shirt/clothing options to maintain level of modesty    AM-PAC 6 CLICK ADL   How much help from another person does this patient currently need?   1 = Unable, Total/Dependent Assistance  2 = A lot, Maximum/Moderate Assistance  3 = A little, Minimum/Contact Guard/Supervision  4 = None, Modified [place]/Independent    Putting on and taking off regular lower body clothing? : 2  Bathing (including washing, rinsing, drying)?: 2  Toileting, which includes using toilet, bedpan, or urinal? : 3  Putting on and taking off regular upper body clothing?: 2  Taking care of personal grooming such as brushing teeth?: 3  Eating meals?: 3  Total Score: 15     AM-PAC Raw Score CMS " G-Code Modifier Level of Impairment Assistance   6 % Total / Unable   7 - 9 CM 80 - 100% Maximal Assist   10 - 14 CL 60 - 80% Moderate Assist   15 - 19 CK 40 - 60% Moderate Assist   20 - 22 CJ 20 - 40% Minimal Assist   23 CI 1-20% SBA / CGA   24 CH 0% Independent/ Mod I     Patient left up in chair with all lines intact, call button in reach, nsg notified and family members present    ASSESSMENT:  Annette Garcia is a 81 y.o. female with a medical diagnosis of Symptomatic anemia   Patient improving with ax tolerance. OOB in chair this date. Patient will benefit from skilled OT to address functional deficits.    Rehab identified problem list/impairments: Rehab identified problem list/impairments: weakness, impaired endurance, impaired self care skills, impaired functional mobilty, gait instability, impaired balance, decreased upper extremity function, decreased ROM, orthopedic precautions, pain, impaired fine motor    Rehab potential is good.    Activity tolerance: Good    Discharge recommendations: Discharge Facility/Level Of Care Needs: nursing facility, skilled     Barriers to discharge: Barriers to Discharge: Decreased caregiver support, Inaccessible home environment    Equipment recommendations: none     GOALS:   Occupational Therapy Goals        Problem: Occupational Therapy Goal    Goal Priority Disciplines Outcome Interventions   Occupational Therapy Goal     OT, PT/OT Ongoing (interventions implemented as appropriate)    Description:  Goals to be met by: 3/25/2017    Patient will increase functional independence with ADLs by performing:    Feeding with Modified [place].  UE Dressing with Moderate Assistance.  LE Dressing with Moderate Assistance.  Grooming while standing with Supervision.  Toileting from bedside commode with Contact Guard Assistance for hygiene and clothing management.   Bathing from  edge of bed with Minimal Assistance.  Stand pivot transfers with Supervision.  Toilet transfer  to bedside commode with Supervision.  Upper extremity exercise program x10 reps per handout, with supervision for R fingers except 5th digit and hand.  Increase r hand AROM wfl except 5th digit .                Plan:  Patient to be seen 5 x/week to address the above listed problems via self-care/home management, therapeutic activities, therapeutic exercises  Plan of Care expires: 04/15/17  Plan of Care reviewed with: patient, family         MARY Go  03/17/2017

## 2017-03-17 NOTE — SUBJECTIVE & OBJECTIVE
Oncology Treatment Plan:   OP R-CHOP    Medications:  Continuous Infusions:   Scheduled Meds:   carbamazepine  200 mg Oral Nightly    carvedilol  25 mg Oral BID    ferrous sulfate  325 mg Oral Daily    levothyroxine  100 mcg Oral Before breakfast    lisinopril  20 mg Oral Daily    magnesium oxide  400 mg Oral Daily    phenobarbital  64.8 mg Oral QHS     PRN Meds:sodium chloride, acetaminophen, heparin, porcine (PF), ondansetron, polyethylene glycol, ramelteon     Review of patient's allergies indicates:   Allergen Reactions    Adhesive Itching and Blisters    Penicillins Anaphylaxis    Tramadol Hives    Avelox [moxifloxacin] Rash     Facial and arm itching and redness. Pt states throat closes when given.    Amoxil [amoxicillin]     Aspridrox [aspirin, buffered]     Codeine Other (See Comments)     Throat swelling    Keflex [cephalexin]     Norvasc [amlodipine]     Red dye Hives    Sulfa (sulfonamide antibiotics)     Tylenol [acetaminophen]      Has reaction to Tylenol with red dye and unable to take Extra Strength Tylenol/ CAN ONLY TOLERATE REG STRENGTH TYLENOL    Vicks vaporub [camphor-eucalyptus oil-menthol]         Past Medical History:   Diagnosis Date    Cancer     colon    Hypertension     Petit mal epilepsy 1954    Scoliosis of lumbar spine     Seizures     Unspecified hypothyroidism      Past Surgical History:   Procedure Laterality Date    APPENDECTOMY      BACK SURGERY  1988    vertebral fracture    BACK SURGERY  02/2013    lumbar L2-5    CATARACT EXTRACTION, BILATERAL Bilateral     CHOLECYSTECTOMY      open    COLON SURGERY      EYE SURGERY Bilateral     cataract removal with lens implant    HYSTERECTOMY      PORTACATH PLACEMENT Right 09/2016    TONSILLECTOMY      VAGINAL HYSTERECTOMY W/ ANTERIOR AND POSTERIOR VAGINAL REPAIR       Family History     Problem Relation (Age of Onset)    Heart attack Father    Hypertension Father    Pancreatic cancer Mother        Social  History Main Topics    Smoking status: Never Smoker    Smokeless tobacco: Not on file    Alcohol use No    Drug use: No    Sexual activity: Not on file       Review of Systems   Constitutional: Positive for fatigue. Negative for fever and unexpected weight change.   HENT: Negative for mouth sores and nosebleeds.    Eyes: Negative for pain.   Respiratory: Negative for wheezing and stridor.    Gastrointestinal: Negative for abdominal pain and blood in stool.   Musculoskeletal: Positive for arthralgias. Negative for gait problem.   Neurological: Negative for seizures and headaches.   Hematological: Negative for adenopathy. Does not bruise/bleed easily.   Psychiatric/Behavioral: The patient is nervous/anxious.      Objective:     Vital Signs (Most Recent):  Temp: 98 °F (36.7 °C) (03/17/17 0800)  Pulse: 62 (03/17/17 0800)  Resp: 18 (03/17/17 0800)  BP: (!) 170/75 (03/17/17 0800)  SpO2: (!) 94 % (03/17/17 0813) Vital Signs (24h Range):  Temp:  [98 °F (36.7 °C)-98.6 °F (37 °C)] 98 °F (36.7 °C)  Pulse:  [62-70] 62  Resp:  [18-20] 18  SpO2:  [93 %-98 %] 94 %  BP: (148-170)/(63-75) 170/75     Weight: 63.5 kg (140 lb)  Body mass index is 30.3 kg/(m^2).  Body surface area is 1.6 meters squared.      Intake/Output Summary (Last 24 hours) at 03/17/17 1208  Last data filed at 03/17/17 1000   Gross per 24 hour   Intake              365 ml   Output             1850 ml   Net            -1485 ml       Physical Exam   Constitutional: She is oriented to person, place, and time. She appears well-developed and well-nourished. No distress.   HENT:   Mouth/Throat: Oropharynx is clear and moist and mucous membranes are normal. Mucous membranes are not pale. No oropharyngeal exudate.   Eyes: Conjunctivae are normal. No scleral icterus.   Neck: Normal range of motion. Neck supple. No thyroid mass (Thyroid area is non-tender) and no thyromegaly present.   Cardiovascular: Normal rate and regular rhythm.  Exam reveals no friction rub.     Pulmonary/Chest: Effort normal and breath sounds normal. No accessory muscle usage or stridor. No respiratory distress. She has no wheezes.   Abdominal: She exhibits no ascites and no mass. There is no hepatosplenomegaly. There is no tenderness.   Musculoskeletal: She exhibits no edema.   No varicosities noted.   Lymphadenopathy:     She has no cervical adenopathy.        Right: No supraclavicular adenopathy present.        Left: No supraclavicular adenopathy present.   Neurological: She is alert and oriented to person, place, and time.   Skin:        Psychiatric: She has a normal mood and affect. Judgment and thought content normal. Cognition and memory are normal. She exhibits normal recent memory and normal remote memory.       Significant Labs:   All pertinent labs from the last 24 hours have been reviewed.    Diagnostic Results:  I have reviewed and interpreted all pertinent imaging results/findings within the past 24 hours.

## 2017-03-17 NOTE — SUBJECTIVE & OBJECTIVE
Interval History: Sitting up in the chair this AM. Says that pain is well controlled. Ate well this AM.     Review of Systems   Respiratory: Negative for shortness of breath.    Cardiovascular: Negative for chest pain.   Gastrointestinal: Negative for nausea and vomiting.     Objective:     Vital Signs (Most Recent):  Temp: 98 °F (36.7 °C) (03/17/17 0800)  Pulse: 62 (03/17/17 0800)  Resp: 18 (03/17/17 0800)  BP: (!) 170/75 (03/17/17 0800)  SpO2: (!) 94 % (03/17/17 0813) Vital Signs (24h Range):  Temp:  [98 °F (36.7 °C)-98.6 °F (37 °C)] 98 °F (36.7 °C)  Pulse:  [62-70] 62  Resp:  [18-20] 18  SpO2:  [93 %-98 %] 94 %  BP: (148-170)/(63-75) 170/75     Weight: 63.5 kg (140 lb)  Body mass index is 30.3 kg/(m^2).    Intake/Output Summary (Last 24 hours) at 03/17/17 1129  Last data filed at 03/17/17 1000   Gross per 24 hour   Intake              365 ml   Output             1850 ml   Net            -1485 ml      Physical Exam   Constitutional: She is oriented to person, place, and time. She appears well-developed and well-nourished. No distress.   Neurological: She is alert and oriented to person, place, and time.   Psychiatric: She has a normal mood and affect. Her behavior is normal.   Nursing note and vitals reviewed.      Significant Labs: None    Significant Imaging: None

## 2017-03-17 NOTE — PROGRESS NOTES
The Sw spoke to Teresita and she states she received all the info and her DON is about to review them. She spoke to the pt's dtr and she will come in to sign the admit papers when she calls her. They will send their van to come and transport the pt to their facility. The Sw informed the pt's nurse of this info.

## 2017-03-17 NOTE — PLAN OF CARE
Problem: Physical Therapy Goal  Goal: Physical Therapy Goal  Goals to be met by: 3/29/2017     Patient will increase functional independence with mobility by performin. Supine to sit with Stand-by Assistance  2. Sit to stand transfer with Supervision  3. Gait x 50 feet with Supervision using Single-point Cane .    Pt progressing towards goals, inc. Amb. Dist today. , 80' w/ St cane and CGA.

## 2017-03-17 NOTE — PLAN OF CARE
[facility]  Ryan Espinosa MD, [facility]     MD Scott Lovelace FNP Renee Melancon, PA-C Rosanne Zeringue, NP  [address] [place] [id number]  Office: [phone]  Fax: [phone]      NURSING HOME ORDERS    03/17/2017    Admit to Nursing Home:    [facility]                                                 Diagnoses:  Active Hospital Problems    Diagnosis  POA    *Symptomatic anemia [D64.9]  Yes    Thrombocytopenia, unspecified [D69.6]  Yes    Closed comminuted fracture of right humerus [S42.351A]  Yes    Closed displaced fracture of distal phalanx of right little finger [S62.636A]  Yes    Bilateral renal artery stenosis [I70.1]  Yes    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]  Yes     Chronic    Chronic hyponatremia [E87.1]  Yes     Chronic    DLBCL (diffuse large B cell lymphoma) [C83.30]  Yes     Chronic    Acquired hypothyroidism [E03.9]  Yes     Chronic    Osteoarthritis of lumbar spine [M47.816]  Yes     Chronic    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]  Yes     Chronic    Renovascular hypertension [I15.0]  Yes     Chronic    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 Pt states throat closes when given.    Amoxil [amoxicillin]     Aspridrox [aspirin, buffered]     Codeine Other (See Comments)     Throat swelling    Keflex [cephalexin]     Norvasc [amlodipine]     Red dye  Hives    Sulfa (sulfonamide antibiotics)     Tylenol [acetaminophen]      Has reaction to Tylenol with red dye and unable to take Extra Strength Tylenol/ CAN ONLY TOLERATE REG STRENGTH TYLENOL    Vicks vaporub [camphor-eucalyptus oil-menthol]        Discharge Procedure Orders  Diet Adult Regular     Vital signs per facility protocol     Skin assessment every shift      Activity: Per rehab recommendations     Weight bearing status:   Order Comments: Non-weight bearing to RUE. Keep RUE in sling at all times.     Up in chair/ wheel chair     Intake and output per facility protocol     CBC auto differential   Order Comments: Weekly on Wednesday. Fax to Dr. Arredondo at [phone]     Full code         Nursing Precautions:         - Fall precautions per nursing home protocol   - Seizure precaution per nursing home protocol      CONSULTS:     Physical Therapy to evaluate and treat 5 times a week     Occupational Therapy to evaluate and treat 5 times a week        Medications: Discontinue all previous medication orders, if any. See new list below.     Annette Garcia   Home Medication Instructions STEFANO:[id number]    Printed on:03/17/17 3676   Medication Information                      acetaminophen (TYLENOL) 325 MG tablet  Take 2 tablets (650 mg total) by mouth every 4 (four) hours as needed for Pain.             carbamazepine (EPITOL) 200 mg tablet  Take 1 tablet (200 mg total) by mouth nightly.             carvedilol (COREG) 25 MG tablet  Take 1 tablet (25 mg total) by mouth 2 (two) times daily with meals.             ferrous sulfate 325 (65 FE) MG EC tablet  Take 1 tablet (325 mg total) by mouth once daily.             levothyroxine (SYNTHROID) 100 MCG tablet  Take 1 tablet (100 mcg total) by mouth once daily.             lisinopril (PRINIVIL,ZESTRIL) 40 MG tablet  Take 1 tablet (40 mg total) by mouth once daily.             magnesium oxide (MAG-OX) 400 mg tablet  Take 1 tablet (400 mg total) by mouth once daily.              ondansetron (ZOFRAN-ODT) 8 MG TbDL  Take 1 tablet (8 mg total) by mouth every 8 (eight) hours as needed.             phenobarbital (LUMINAL) 64.8 MG tablet  Take 1 tablet (64.8 mg total) by mouth every evening.             polyethylene glycol (GLYCOLAX) 17 gram PwPk  Take 17 g by mouth 2 (two) times daily as needed (Constipation).                 Ryan Espinosa MD  03/17/2017

## 2017-03-17 NOTE — PLAN OF CARE
ISRAEL working on SNF discharge to [facility].    Future Appointments  Date Time Provider Department Center   3/24/2017 11:20 AM Damien Bernal MD [facility] NEURO [place]   4/4/2017 8:00 AM Kevin Saravia Jr., MD [facility]THO [place] [facility]   4/7/2017 8:00 AM Damien Bernal MD [facility] NEURO [place]   4/18/2017 10:00 AM BOB Marie [facility] ORTHO [place] [facility]          03/17/17 1207   Final Note   Assessment Type Final Discharge Note   Discharge Disposition SNF   Discharge planning education complete? Yes   Hospital Follow Up  Appt(s) scheduled? Yes   Right Care Referral Info   Post Acute Recommendation SNF   Referral Type [facility]       Yasmine Mandel RN  Transition Navigator  [phone]

## 2017-03-17 NOTE — PLAN OF CARE
Problem: Occupational Therapy Goal  Goal: Occupational Therapy Goal  Goals to be met by: 3/25/2017    Patient will increase functional independence with ADLs by performing:    Feeding with Modified [place].  UE Dressing with Moderate Assistance.  LE Dressing with Moderate Assistance.  Grooming while standing with Supervision.  Toileting from bedside commode with Contact Guard Assistance for hygiene and clothing management.   Bathing from edge of bed with Minimal Assistance.  Stand pivot transfers with Supervision.  Toilet transfer to bedside commode with Supervision.  Upper extremity exercise program x10 reps per handout, with supervision for R fingers except 5th digit and hand.  Increase r hand AROM wfl except 5th digit .   Outcome: Ongoing (interventions implemented as appropriate)  Patient improving with ax tolerance. OOB in chair this date. Patient will benefit from skilled OT to address functional deficits.

## 2017-03-17 NOTE — PT/OT/SLP PROGRESS
"Physical Therapy  Treatment    Annette Garcia   MRN: [id number]   Admitting Diagnosis: Symptomatic anemia    PT Received On: 17  PT Start Time: 1027     PT Stop Time: 1052    PT Total Time (min): 25 min       Billable Minutes:  Gait [id number] and Therapeutic Exercise 10    Treatment Type: Treatment  PT/PTA: PTA     PTA Visit Number: 1       General Precautions: Standard, fall, seizure  Orthopedic Precautions: RUE non weight bearing   Braces: UE Sling    Do you have any cultural, spiritual, Episcopal conflicts, given your current situation?: none    Subjective:  Communicated with ns prior to session.  Pt agreeable to tx., stated "At least i got my legs" (re: unable to use RUE at this time)    Pain Ratin/10              Pain Rating Post-Intervention: 0/10    Objective:   Patient found with: telemetry    Functional Mobility:  Bed Mobility:   Rolling/Turning to Left: Minimum assistance, With side rail  Supine to Sit: Minimum Assistance, With side rail    Transfers:  Sit <> Stand Assistance: Contact Guard Assistance  Sit <> Stand Assistive Device: Straight Cane    Gait: RUE in sling  Gait Distance: 80' w/ st cane and CGA  Assistance 1: Contact Guard Assistance  Gait Assistive Device: Single point cane  Gait Pattern: reciprocal  Gait Deviation(s): decreased alexa, forward lean, decreased step length, decreased stride length    Stairs:  n/a    Balance:   Static Sit: FAIR+: Able to take MINIMAL challenges from all directions  Dynamic Sit: FAIR+: Maintains balance through MINIMAL excursions of active trunk motion  Static Stand: FAIR: Maintains without assist but unable to take challenges  Dynamic stand: FAIR: Needs CONTACT GUARD during gait     Therapeutic Activities and Exercises:  Pt perf'd seated LE ex's of heel/toe raises, LAQ's, hip flex x 10 ea.      AM-PAC 6 CLICK MOBILITY  How much help from another person does this patient currently need?   1 = Unable, Total/Dependent Assistance  2 = A lot, " Maximum/Moderate Assistance  3 = A little, Minimum/Contact Guard/Supervision  4 = None, Modified [place]/Independent    Turning over in bed (including adjusting bedclothes, sheets and blankets)?: 3  Sitting down on and standing up from a chair with arms (e.g., wheelchair, bedside commode, etc.): 3  Moving from lying on back to sitting on the side of the bed?: 3  Moving to and from a bed to a chair (including a wheelchair)?: 3  Need to walk in hospital room?: 3  Climbing 3-5 steps with a railing?: 3  Total Score: 18    AM-PAC Raw Score CMS G-Code Modifier Level of Impairment Assistance   6 % Total / Unable   7 - 9 CM 80 - 100% Maximal Assist   10 - 14 CL 60 - 80% Moderate Assist   15 - 19 CK 40 - 60% Moderate Assist   20 - 22 CJ 20 - 40% Minimal Assist   23 CI 1-20% SBA / CGA   24 CH 0% Independent/ Mod I     Patient left up in chair with all lines intact, call button in reach, ns notified and OT present.    Assessment:  Annette Garcia is a 81 y.o. female with a medical diagnosis of Symptomatic anemia and presents with inc mobility today, inc amb dist., though req'd CGA for safety w/ cane.    Rehab identified problem list/impairments: Rehab identified problem list/impairments: weakness, impaired endurance, impaired self care skills, impaired functional mobilty, gait instability, impaired balance, orthopedic precautions, decreased ROM, decreased upper extremity function    Rehab potential is good.    Activity tolerance: Good    Discharge recommendations: Discharge Facility/Level Of Care Needs: nursing facility, skilled     Barriers to discharge: Barriers to Discharge: Decreased caregiver support, Inaccessible home environment    Equipment recommendations: Equipment Needed After Discharge: none     GOALS:   Physical Therapy Goals        Problem: Physical Therapy Goal    Goal Priority Disciplines Outcome Goal Variances Interventions   Physical Therapy Goal     PT/OT, PT Ongoing (interventions implemented  as appropriate)     Description:  Goals to be met by: 3/29/2017     Patient will increase functional independence with mobility by performin. Supine to sit with Stand-by Assistance  2. Sit to stand transfer with Supervision  3. Gait  x 50 feet with Supervision using Single-point Cane .                 PLAN:    Patient to be seen 5 x/week  to address the above listed problems via gait training, therapeutic activities, therapeutic exercises  Plan of Care expires: 17  Plan of Care reviewed with: patient, son         Karly Fang, PTA  2017

## 2017-03-17 NOTE — PROGRESS NOTES
The Sw spoke to Teresita at [facility] and she states she's going to speak with her DON(Katey)to see if she assessed the pt b/c she has to get clearance from her before she can accept the pt.The Sw asked her to speak with her and call the Sw back b/c the pt's ready for d/c today. The Sw stressed the importance of her finding out in a speedy manner b/c the pt's ready for d/c today.      10:22am The Sw spoke to Teresita and she states the pt has been medically accepted to [facility] and she's about to call the pt's dtr Beatris([phone]) POA to establish a gary for her to sign the admit paperwork. The Sw spoke to Dr. Jovel and asked him to write the d/c orders.

## 2017-03-17 NOTE — PLAN OF CARE
Problem: Patient Care Overview  Goal: Plan of Care Review  Outcome: Ongoing (interventions implemented as appropriate)  Plan of care reviewed with patient. Voices understanding.  NSR on monitor today with no red alarms noted. Patient with minimal pain today relieved by po tylenol. Will be monitored overnight.

## 2017-03-17 NOTE — PROGRESS NOTES
[facility] Hospital Medicine  Progress Note    Patient Name: Annette Garcia  MRN: [id number]  Patient Class: IP- Inpatient   Admission Date: 3/13/2017  Length of Stay: 3 days  Attending Physician: Ryan Espinosa MD  Primary Care Provider: Jose Valles MD        Subjective:     Principal Problem:Symptomatic anemia    HPI:  Annette Garcia is a 82 yo  woman with petit mal epilepsy since age 19 (last seizure age 26), post-traumatic lumbar spondylosis, sacroiliac degenerative joint disease, difficult to control renovascular hypertension (s/p renal angiogram on 3/8/17), hypothyroidism, diffuse large B cell lymphoma (diagnosed 8/23/16, last chemo 1/17/17). She lives in [place], [place] with her , who has lung cancer. She has 7 children. She uses a straight cane to ambulate. Her primary care physician is Dr. Jose Torres. Her oncologist is Dr. Tyson Arredondo. Her cardiologist is Dr. Timmy Simmons.    Patient presented to [facility] ED on 3/13/17 after being found on floor by her daughter after falling.  Patient reports that she was attempting to walk outside at approximately 3:30 pm when she began to feel weak and dizzy.  She states that she fell forward and hit head and shoulder on door and then proceeded to break her fall to the floor by using her hands. She denies loss of consciousness, but reports that she was too weak to move or call for help.  She was found by her daughter at approximately 5 pm.  She denies headache, nausea, vomiting, paresthesias, chest pain, or shortness of breath prior to fall.  Denies hematemesis, melena, hematochezia.  Patient reports that she has been experiencing dizziness and weakness x 3-4 weeks.    Upon arrival to ED patient with laceration of 4th and 5th right fingers (sutured by ED physician); found to have Hgb/Hct 6.7/20 (down from 8.9/25.9 on 3/8/17).  Head CT with no acute hemorrhage.  Right shoulder x-ray with comminuted  fracture of the right humerus with intra-articular extension.  Minimally displaced fracture of the base of the distal phalanx of the 5th digit noted on right hand x-ray.  Orthopedic surgeon Dr. Saravia was consulted by ED physician.  Patient given tylenol 650 mg and NS infusion at 125 mls/hr in ED.  Admitted to Hospital Medicine service with symptomatic anemia.      Hospital Course:  Upon arrival to ED patient with laceration of 4th and 5th right fingers (sutured by ED physician); found to have Hgb/Hct 6.7/20 (down from 8.9/25.9 on 3/8/17).  Head CT with no acute hemorrhage.  Right shoulder x-ray with comminuted fracture of the right humerus with intra-articular extension.  Minimally displaced fracture of the base of the distal phalanx of the 5th digit noted on right hand x-ray.  Orthopedic surgeon Dr. Saravia was consulted by ED physician.  Patient given tylenol 650 mg and NS infusion at 125 mls/hr in ED.  Admitted to Hospital Medicine service with symptomatic anemia. Hemoglobin responded well to the 2 units of PRBCs transfused. Dr. Saravia recommended non-operative management of the fracture with a sling. PT/OT evaluations were performed and they recommended SNF placement.     Interval History: Sitting up in the chair this AM. Says that pain is well controlled. Ate well this AM.     Review of Systems   Respiratory: Negative for shortness of breath.    Cardiovascular: Negative for chest pain.   Gastrointestinal: Negative for nausea and vomiting.     Objective:     Vital Signs (Most Recent):  Temp: 98 °F (36.7 °C) (03/17/17 0800)  Pulse: 62 (03/17/17 0800)  Resp: 18 (03/17/17 0800)  BP: (!) 170/75 (03/17/17 0800)  SpO2: (!) 94 % (03/17/17 0813) Vital Signs (24h Range):  Temp:  [98 °F (36.7 °C)-98.6 °F (37 °C)] 98 °F (36.7 °C)  Pulse:  [62-70] 62  Resp:  [18-20] 18  SpO2:  [93 %-98 %] 94 %  BP: (148-170)/(63-75) 170/75     Weight: 63.5 kg (140 lb)  Body mass index is 30.3 kg/(m^2).    Intake/Output Summary (Last 24  hours) at 03/17/17 1129  Last data filed at 03/17/17 1000   Gross per 24 hour   Intake              365 ml   Output             1850 ml   Net            -1485 ml      Physical Exam   Constitutional: She is oriented to person, place, and time. She appears well-developed and well-nourished. No distress.   Neurological: She is alert and oriented to person, place, and time.   Psychiatric: She has a normal mood and affect. Her behavior is normal.   Nursing note and vitals reviewed.      Significant Labs: None    Significant Imaging: None    Assessment/Plan:      * Symptomatic anemia  Anemia due to antineoplastic chemotherapy  Diffuse large B cell lymphoma  Last chemotherapy on 1/17/17. Patient received transfusion of 2 units pRBCs. Will check CBC weekly per Dr. Arredondo.     Renovascular hypertension  SBP ranged 127 to 170. Continue carvedilol 25 mg BID. Resume lisinopril to home 40 mg daily. Discontinued home chlorthalidone.     Nonintractable absence epilepsy without status epilepticus  Continue home dose phenobarbital and carbamazepine.  Seizure precautions.      Closed comminuted fracture of right humerus  S/p fall. Appreciate Dr. Saravia. Will follow up with him in a week. Continue NWB status with RUE in sling. PT/OT recommend SNF. PRN pain medication.      VTE Risk Mitigation         Ordered     High Risk of VTE  Once      03/14/17 0253     Place PATRICIO hose  Until discontinued      03/14/17 0253     Place sequential compression device  Until discontinued      03/14/17 0253        Time Spent:  I spent 35 minutes on this discharge, which includes examination, reviewing hospital course with patient/family, reviewing discharge medications and arranging follow-up care.      Ryan Espinosa MD  Department of Hospital Medicine   [facility]

## 2017-03-17 NOTE — DISCHARGE INSTRUCTIONS
FRACTURE, SHOULDER (ENGLISH) View Edit Remove   ANEMIA (ENGLISH) View Edit Remove   BLOOD TRANSFUSION (ADULT), WHEN YOU NEED A (ENGLISH) View Edit Remove   IRON TABLETS, CAPSULES, EXTENDED-RELEASE TABLETS (ENGLISH) View Edit Remove   ONDANSETRON TABLETS (ENGLISH) View Edit Remove   POLYETHYLENE GLYCOL POWDER (ENGLISH) View Edit Remove   MAGNESIUM SALTS CAPSULES OR TABLETS, IMMEDIATE RELEASE (ENGLISH)

## 2017-03-17 NOTE — ASSESSMENT & PLAN NOTE
Anemia secondary to chemotherapy.  She has been transfused 2 units of blood recently.  Current hemoglobin is over 9 grams per deciliter.  Will need weekly CBCs outpatient after hospital discharge - being arranged at skilled nursing facility.

## 2017-03-17 NOTE — PLAN OF CARE
03/17/17 1159   Final Note   Assessment Type Final Discharge Note   Discharge Disposition SNF   Right Care Referral Info   Post Acute Recommendation SNF   Referral Type snf   Facility Name [facility]

## 2017-03-17 NOTE — PLAN OF CARE
Problem: Patient Care Overview  Goal: Plan of Care Review  Outcome: Ongoing (interventions implemented as appropriate)  Patient in bed resting comfortably. Patient is scheduled to have an x-ray of right shoulder today. Right arm imobilized with sling. Pain controlled with tylenol Q4 hrs. Neuro checks Q4, seizure and fall precautions maintained.

## 2017-03-17 NOTE — ASSESSMENT & PLAN NOTE
SBP ranged 127 to 170. Continue carvedilol 25 mg BID. Resume lisinopril to home 40 mg daily. Discontinued home chlorthalidone.

## 2017-03-17 NOTE — NURSING
Report given to REFUGIO Ramsay at [facility]. Discharged instructions and education provided to patient. Patient discharged with no acute distress noted.

## 2017-03-17 NOTE — PROGRESS NOTES
[facility]  Hematology/Oncology  Progress Note    Patient Name: Annette Garcia  Admission Date: 3/13/2017  Hospital Length of Stay: 3 days  Code Status: Prior     Subjective:     HPI:  Patient well known to me.  81-year-old female with stage III diffuse large B-cell lymphoma status post R CHOP chemotherapy, last/6 cycle administered on January 17, 2017.  She then got admitted and received blood for anemia secondary to chemotherapy around January 26.  She is now admitted after she sustained a fall at home and broke her right humerus.  Hemoglobin on admission 6.7, she received 2 units of blood and her current hemoglobin is 9.4.  Humeral fracture being managed conservatively    Oncology Treatment Plan:   OP R-CHOP    Medications:  Continuous Infusions:   Scheduled Meds:   carbamazepine  200 mg Oral Nightly    carvedilol  25 mg Oral BID    ferrous sulfate  325 mg Oral Daily    levothyroxine  100 mcg Oral Before breakfast    lisinopril  20 mg Oral Daily    magnesium oxide  400 mg Oral Daily    phenobarbital  64.8 mg Oral QHS     PRN Meds:sodium chloride, acetaminophen, heparin, porcine (PF), ondansetron, polyethylene glycol, ramelteon     Review of patient's allergies indicates:   Allergen Reactions    Adhesive Itching and Blisters    Penicillins Anaphylaxis    Tramadol Hives    Avelox [moxifloxacin] Rash     Facial and arm itching and redness. Pt states throat closes when given.    Amoxil [amoxicillin]     Aspridrox [aspirin, buffered]     Codeine Other (See Comments)     Throat swelling    Keflex [cephalexin]     Norvasc [amlodipine]     Red dye Hives    Sulfa (sulfonamide antibiotics)     Tylenol [acetaminophen]      Has reaction to Tylenol with red dye and unable to take Extra Strength Tylenol/ CAN ONLY TOLERATE REG STRENGTH TYLENOL    Vicks vaporub [camphor-eucalyptus oil-menthol]         Past Medical History:   Diagnosis Date    Cancer     colon    Hypertension     Petit  mal epilepsy 1954    Scoliosis of lumbar spine     Seizures     Unspecified hypothyroidism      Past Surgical History:   Procedure Laterality Date    APPENDECTOMY      BACK SURGERY  1988    vertebral fracture    BACK SURGERY  02/2013    lumbar L2-5    CATARACT EXTRACTION, BILATERAL Bilateral     CHOLECYSTECTOMY      open    COLON SURGERY      EYE SURGERY Bilateral     cataract removal with lens implant    HYSTERECTOMY      PORTACATH PLACEMENT Right 09/2016    TONSILLECTOMY      VAGINAL HYSTERECTOMY W/ ANTERIOR AND POSTERIOR VAGINAL REPAIR       Family History     Problem Relation (Age of Onset)    Heart attack Father    Hypertension Father    Pancreatic cancer Mother        Social History Main Topics    Smoking status: Never Smoker    Smokeless tobacco: Not on file    Alcohol use No    Drug use: No    Sexual activity: Not on file       Review of Systems   Constitutional: Positive for fatigue. Negative for fever and unexpected weight change.   HENT: Negative for mouth sores and nosebleeds.    Eyes: Negative for pain.   Respiratory: Negative for wheezing and stridor.    Gastrointestinal: Negative for abdominal pain and blood in stool.   Musculoskeletal: Positive for arthralgias. Negative for gait problem.   Neurological: Negative for seizures and headaches.   Hematological: Negative for adenopathy. Does not bruise/bleed easily.   Psychiatric/Behavioral: The patient is nervous/anxious.      Objective:     Vital Signs (Most Recent):  Temp: 98 °F (36.7 °C) (03/17/17 0800)  Pulse: 62 (03/17/17 0800)  Resp: 18 (03/17/17 0800)  BP: (!) 170/75 (03/17/17 0800)  SpO2: (!) 94 % (03/17/17 0813) Vital Signs (24h Range):  Temp:  [98 °F (36.7 °C)-98.6 °F (37 °C)] 98 °F (36.7 °C)  Pulse:  [62-70] 62  Resp:  [18-20] 18  SpO2:  [93 %-98 %] 94 %  BP: (148-170)/(63-75) 170/75     Weight: 63.5 kg (140 lb)  Body mass index is 30.3 kg/(m^2).  Body surface area is 1.6 meters squared.      Intake/Output Summary (Last 24  hours) at 03/17/17 1208  Last data filed at 03/17/17 1000   Gross per 24 hour   Intake              365 ml   Output             1850 ml   Net            -1485 ml       Physical Exam   Constitutional: She is oriented to person, place, and time. She appears well-developed and well-nourished. No distress.   HENT:   Mouth/Throat: Oropharynx is clear and moist and mucous membranes are normal. Mucous membranes are not pale. No oropharyngeal exudate.   Eyes: Conjunctivae are normal. No scleral icterus.   Neck: Normal range of motion. Neck supple. No thyroid mass (Thyroid area is non-tender) and no thyromegaly present.   Cardiovascular: Normal rate and regular rhythm.  Exam reveals no friction rub.    Pulmonary/Chest: Effort normal and breath sounds normal. No accessory muscle usage or stridor. No respiratory distress. She has no wheezes.   Abdominal: She exhibits no ascites and no mass. There is no hepatosplenomegaly. There is no tenderness.   Musculoskeletal: She exhibits no edema.   No varicosities noted.   Lymphadenopathy:     She has no cervical adenopathy.        Right: No supraclavicular adenopathy present.        Left: No supraclavicular adenopathy present.   Neurological: She is alert and oriented to person, place, and time.   Skin:        Psychiatric: She has a normal mood and affect. Judgment and thought content normal. Cognition and memory are normal. She exhibits normal recent memory and normal remote memory.       Significant Labs:   All pertinent labs from the last 24 hours have been reviewed.    Diagnostic Results:  I have reviewed and interpreted all pertinent imaging results/findings within the past 24 hours.    Assessment/Plan:     DLBCL (diffuse large B cell lymphoma)  Stage III diffuse large B-cell lymphoma.  Completed 6 cycles of chemotherapy in January.  Will continue to monitor.  No evidence of recurrent disease based on recent clinical profile.    * Symptomatic anemia  Anemia secondary to  chemotherapy.  She has been transfused 2 units of blood recently.  Current hemoglobin is over 9 grams per deciliter.  Will need weekly CBCs outpatient after hospital discharge - being arranged at skilled nursing facility.    Thrombocytopenia, unspecified  This is also likely secondary to chemotherapy that she received in January.  Her bone marrow does not appear to have recovered yet.  Will continue to monitor counts.  If cytopenias persist will consider repeat bone marrow aspiration/biopsy in May.    Awaiting transfer to SNF. No active heme/onc issues at this time.     Tyson Arredondo MD  Hematology/Oncology  [facility]

## 2017-03-17 NOTE — PLAN OF CARE
Problem: Fracture Orthopaedic (Adult)  Goal: Signs and Symptoms of Listed Potential Problems Will be Absent, Minimized or Managed (Fracture Orthopaedic)  Signs and symptoms of listed potential problems will be absent, minimized or managed by discharge/transition of care (reference Fracture Orthopaedic (Adult) CPG).   Outcome: Unable to achieve outcome(s) by discharge  Will continue to heal and work with physical therapy at rehab

## 2017-03-17 NOTE — ASSESSMENT & PLAN NOTE
Anemia due to antineoplastic chemotherapy  Diffuse large B cell lymphoma  Last chemotherapy on 1/17/17. Patient received transfusion of 2 units pRBCs. Will check CBC weekly per Dr. Arredondo.

## 2017-03-19 NOTE — ASSESSMENT & PLAN NOTE
Continue carvedilol 25 mg BID. Resume lisinopril to home 40 mg daily. Discontinued home chlorthalidone because of hyponatremia.

## 2017-03-19 NOTE — DISCHARGE SUMMARY
[facility] Hospital Medicine  Discharge Summary      Patient Name: Annette Garcia  MRN: [id number]  Admission Date: 3/13/2017  Hospital Length of Stay: 3 days  Discharge Date and Time: 3/17/2017  3:00 PM  Attending Physician: Ryan Espinosa MD  Discharging Provider: Ryan Espinosa MD  Primary Care Provider: Jose Valles MD      HPI:   Annette Garcia is a 80 yo  woman with petit mal epilepsy since age 19 (last seizure age 26), post-traumatic lumbar spondylosis, sacroiliac degenerative joint disease, difficult to control renovascular hypertension (s/p renal angiogram on 3/8/17), hypothyroidism, diffuse large B cell lymphoma (diagnosed 8/23/16, last chemo 1/17/17). She lives in [place], [place] with her , who has lung cancer. She has 7 children. She uses a straight cane to ambulate. Her primary care physician is Dr. Jose Torres. Her oncologist is Dr. Tyson Arredondo. Her cardiologist is Dr. Timmy Simmons.    Patient presented to [facility] ED on 3/13/17 after being found on floor by her daughter after falling.  Patient reports that she was attempting to walk outside at approximately 3:30 pm when she began to feel weak and dizzy.  She states that she fell forward and hit head and shoulder on door and then proceeded to break her fall to the floor by using her hands. She denies loss of consciousness, but reports that she was too weak to move or call for help.  She was found by her daughter at approximately 5 pm.  She denies headache, nausea, vomiting, paresthesias, chest pain, or shortness of breath prior to fall.  Denies hematemesis, melena, hematochezia.  Patient reports that she has been experiencing dizziness and weakness x 3-4 weeks.    Upon arrival to ED patient with laceration of 4th and 5th right fingers (sutured by ED physician); found to have Hgb/Hct 6.7/20 (down from 8.9/25.9 on 3/8/17).  Head CT with no acute hemorrhage.  Right shoulder x-ray  with comminuted fracture of the right humerus with intra-articular extension.  Minimally displaced fracture of the base of the distal phalanx of the 5th digit noted on right hand x-ray.  Orthopedic surgeon Dr. Saravia was consulted by ED physician.  Patient given tylenol 650 mg and NS infusion at 125 mls/hr in ED.  Admitted to Hospital Medicine service with symptomatic anemia.      * No surgery found *      Indwelling Lines/Drains at time of discharge:   Lines/Drains/Airways     Central Venous Catheter Line                 Port A Cath Single Lumen 03/14/17 0300 left subclavian 4 days              Hospital Course:   Upon arrival to ED patient with laceration of 4th and 5th right fingers (sutured by ED physician); found to have Hgb/Hct 6.7/20 (down from 8.9/25.9 on 3/8/17).  Head CT with no acute hemorrhage.  Right shoulder x-ray with comminuted fracture of the right humerus with intra-articular extension.  Minimally displaced fracture of the base of the distal phalanx of the 5th digit noted on right hand x-ray.  Orthopedic surgeon Dr. Saravia was consulted by ED physician.  Patient given tylenol 650 mg and NS infusion at 125 mls/hr in ED.  Admitted to Hospital Medicine service with symptomatic anemia. Hemoglobin responded well to the 2 units of PRBCs transfused. Dr. Saravia recommended non-operative management of the fracture with a sling. PT/OT evaluations were performed and they recommended SNF placement. Her hemoglobin remained stable around 9 after the transfusion. Dr. Arredondo recommended weekly CBC to monitor hemoglobin and assess for needed transfusion as an outpatient.      Consults:   Consults         Status Ordering Provider     [facility]     Provider:  (Not yet assigned)    Completed [facility]     Orthopedic Surgery  Once     Provider:  Kevin Saravia Jr., MD    Completed [facility]          Significant Diagnostic Studies: Labs:   BMP:   Recent Labs  Lab  03/17/17  1246   *   *   K 3.9   CL 93*   CO2 26   BUN 13   CREATININE 0.6   CALCIUM 8.9      Radiology:Acute displaced fracture of the right humeral head greater tuberosity. Clustering nodular opacities within the right lung could reflect neoplastic versus infectious or inflammatory process.  Continued followup is recommended.    Pending Diagnostic Studies:     None        Final Active Diagnoses:    Diagnosis Date Noted POA    PRINCIPAL PROBLEM:  Symptomatic anemia [D64.9] 03/14/2017 Yes    Thrombocytopenia, unspecified [D69.6] 03/14/2017 Yes    Closed comminuted fracture of right humerus [S42.351A] 03/14/2017 Yes    Closed displaced fracture of distal phalanx of right little finger [S62.636A] 03/14/2017 Yes    Bilateral renal artery stenosis [I70.1] 01/16/2017 Yes    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] 12/27/2016 Yes     Chronic    Chronic hyponatremia [E87.1] 10/12/2016 Yes     Chronic    DLBCL (diffuse large B cell lymphoma) [C83.30] 09/09/2016 Yes     Chronic    Acquired hypothyroidism [E03.9] 08/18/2016 Yes     Chronic    Osteoarthritis of lumbar spine [M47.816] 08/18/2016 Yes     Chronic    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] 08/18/2016 Yes     Chronic    Renovascular hypertension [I15.0] 08/18/2016 Yes     Chronic    Nonintractable absence epilepsy without status epilepticus [G40.A09] 08/18/2016 Yes     Chronic      Problems Resolved During this Admission:    Diagnosis Date Noted Date Resolved POA    Hypomagnesemia [E83.42] 03/14/2017 03/17/2017 Yes    NSTEMI (non-ST elevated myocardial infarction) [I21.4] 03/14/2017 03/17/2017 Yes    Shoulder fracture, right [S42.91XA] 03/14/2017 03/17/2017 Yes      * Symptomatic anemia  Anemia due to antineoplastic chemotherapy  Diffuse large B cell lymphoma  Last chemotherapy on 1/17/17. Patient received transfusion of 2 units pRBCs. Will check CBC weekly per Dr. Arredondo.     Renovascular hypertension  Continue  carvedilol 25 mg BID. Resume lisinopril to home 40 mg daily. Discontinued home chlorthalidone because of hyponatremia.     Nonintractable absence epilepsy without status epilepticus  Continue home dose phenobarbital and carbamazepine.  Seizure precautions.      Closed comminuted fracture of right humerus  S/p fall. Appreciate Dr. Saravia. Will follow up with him in a week. Continue NWB status with RUE in sling. PT/OT recommend SNF. PRN pain medication.        Discharged Condition: good    Disposition: Skilled Nursing Facility    Follow Up:  Follow-up Information     Follow up with Kevin Saravia Jr, MD On 4/4/2017.    Specialties:  Hand Surgery, Orthopedic Surgery    Why:  8:00am    Contact information:    [address]  [address]  [facility] [id number] [phone]          Follow up with [facility].    Specialty:  Rehabilitation    Why:  SNF    Contact information:    [address] [place] [id number] [phone]          Patient Instructions:     Diet Adult Regular     Vital signs per facility protocol     Skin assessment every shift      Activity: Per rehab recommendations     Weight bearing status:   Order Comments: Non-weight bearing to RUE. Keep RUE in sling at all times.     Up in chair/ wheel chair     Intake and output per facility protocol     CBC auto differential   Order Comments: Weekly on Wednesday. Fax to Dr. Arredondo at [phone]     Full code       Medications:  Reconciled Home Medications:   Discharge Medication List as of 3/17/2017  2:52 PM      START taking these medications    Details   ferrous sulfate 325 (65 FE) MG EC tablet Take 1 tablet (325 mg total) by mouth once daily., Starting 3/17/2017, Until Discontinued, OTC      magnesium oxide (MAG-OX) 400 mg tablet Take 1 tablet (400 mg total) by mouth once daily., Starting 3/17/2017, Until Discontinued, OTC      ondansetron (ZOFRAN-ODT) 8 MG TbDL Take 1 tablet (8 mg total) by mouth every 8 (eight) hours as  needed., Starting 3/17/2017, Until Discontinued, No Print      polyethylene glycol (GLYCOLAX) 17 gram PwPk Take 17 g by mouth 2 (two) times daily as needed (Constipation)., Starting 3/17/2017, Until Discontinued, No Print         CONTINUE these medications which have CHANGED    Details   acetaminophen (TYLENOL) 325 MG tablet Take 2 tablets (650 mg total) by mouth every 4 (four) hours as needed for Pain., Starting 3/17/2017, Until Discontinued, OTC         CONTINUE these medications which have NOT CHANGED    Details   carbamazepine (EPITOL) 200 mg tablet Take 1 tablet (200 mg total) by mouth nightly., Starting 10/10/2016, Until Discontinued, Normal      carvedilol (COREG) 25 MG tablet Take 1 tablet (25 mg total) by mouth 2 (two) times daily with meals., Starting 1/19/2017, Until Discontinued, Normal      levothyroxine (SYNTHROID) 100 MCG tablet Take 1 tablet (100 mcg total) by mouth once daily., Starting 3/17/2016, Until Discontinued, Normal      lisinopril (PRINIVIL,ZESTRIL) 40 MG tablet Take 1 tablet (40 mg total) by mouth once daily., Starting 2/2/2017, Until Fri 2/2/18, Normal      phenobarbital (LUMINAL) 64.8 MG tablet Take 1 tablet (64.8 mg total) by mouth every evening., Starting 10/10/2016, Until Discontinued, Normal         STOP taking these medications       chlorthalidone (HYGROTEN) 25 MG Tab Comments:   Reason for Stopping:         clopidogrel (PLAVIX) 75 mg tablet Comments:   Reason for Stopping:             Time spent on the discharge of patient: 40 minutes    Ryan Espinosa MD  Department of Hospital Medicine  [facility]

## 2017-03-20 ENCOUNTER — PATIENT OUTREACH (OUTPATIENT)
Dept: ADMINISTRATIVE | Facility: CLINIC | Age: 82
End: 2017-03-20
Payer: MEDICARE

## 2017-03-22 ENCOUNTER — TELEPHONE (OUTPATIENT)
Dept: NEUROLOGY | Facility: CLINIC | Age: 82
End: 2017-03-22

## 2017-03-22 NOTE — TELEPHONE ENCOUNTER
----- Message from Karleekatty Jain sent at 3/22/2017  2:05 PM CDT -----  Contact: Beatris [phone]  x_  1st Request  _  2nd Request  _  3rd Request        Who: Beatris (Daughter)    Why: Daughter stated that her mother broke he shoulder and is not able to make her appointment, but she need her medications refill. Please call her to advise    What Number to Call Back: [phone]    When to Expect a call back: (Before the end of the day)   -- if the call is after 12:00, the call back will be tomorrow.

## 2017-03-24 ENCOUNTER — TELEPHONE (OUTPATIENT)
Dept: NEUROLOGY | Facility: CLINIC | Age: 82
End: 2017-03-24

## 2017-03-24 NOTE — TELEPHONE ENCOUNTER
----- Message from Damien Bernal MD sent at 3/22/2017  4:48 PM CDT -----  ----- Message from Karlee Jain sent at 3/22/2017 2:05 PM CDT -----  Contact: Beatris [phone]  x_ 1st Request  _ 2nd Request  _ 3rd Request           Who: Beatris (Daughter)     Why: Daughter stated that her mother broke he shoulder and is not able to make her appointment, but she need her medications refill. Please call her to advise     What Number to Call Back: [phone]     When to Expect a call back: (Before the end of the day)  -- if the call is after 12:00, the call back will be tomorrow.    Called a couple of times and finally left a message to call me back to name of the medication and the pharmacy that it needs to be called in to.

## 2017-03-24 NOTE — TELEPHONE ENCOUNTER
Tried calling daughter a couple of times with no response.  Have left message for daughter to contact me regarding name of medication that needs to be called and for follow-up appointment.

## 2017-03-27 ENCOUNTER — TELEPHONE (OUTPATIENT)
Dept: ORTHOPEDICS | Facility: CLINIC | Age: 82
End: 2017-03-27

## 2017-03-27 NOTE — TELEPHONE ENCOUNTER
----- Message from Christina Booth sent at 3/24/2017  4:16 PM CDT -----  Contact: cara with [facility] [phone]  Ms hoyt would like to know if patient can be seen on 04-04-17 in the afternoon for a hospital f/u

## 2017-03-28 ENCOUNTER — TELEPHONE (OUTPATIENT)
Dept: HEMATOLOGY/ONCOLOGY | Facility: CLINIC | Age: 82
End: 2017-03-28

## 2017-03-30 ENCOUNTER — TELEPHONE (OUTPATIENT)
Dept: HEMATOLOGY/ONCOLOGY | Facility: CLINIC | Age: 82
End: 2017-03-30

## 2017-03-30 NOTE — TELEPHONE ENCOUNTER
Pt's daughter checking if we received weekly lab report.  Acknowledged receipt of labs 3/21 but not yesterday's.  Amira worried about drop in hemoglobin.  Told her that Dr Arredondo wants to continue monitoring labs.  Should hgb drop below 8.5, he will be calling patient in for possible transfusion and bone marrow bx.    Amira will make sure lab report sent to us.

## 2017-03-31 ENCOUNTER — TELEPHONE (OUTPATIENT)
Dept: HEMATOLOGY/ONCOLOGY | Facility: CLINIC | Age: 82
End: 2017-03-31

## 2017-03-31 NOTE — TELEPHONE ENCOUNTER
Pt had labs drawn at [facility] on 3/28/17 with hemoglobin of 9.4 and platelets of 120.

## 2017-04-01 RX ORDER — LEVOTHYROXINE SODIUM 100 UG/1
TABLET ORAL
Qty: 90 TABLET | Refills: 3 | Status: SHIPPED | OUTPATIENT
Start: 2017-04-01 | End: 2018-03-26 | Stop reason: SDUPTHER

## 2017-04-03 ENCOUNTER — PATIENT MESSAGE (OUTPATIENT)
Dept: HEMATOLOGY/ONCOLOGY | Facility: CLINIC | Age: 82
End: 2017-04-03

## 2017-04-04 ENCOUNTER — OFFICE VISIT (OUTPATIENT)
Dept: ORTHOPEDICS | Facility: CLINIC | Age: 82
End: 2017-04-04
Payer: MEDICARE

## 2017-04-04 ENCOUNTER — HOSPITAL ENCOUNTER (OUTPATIENT)
Dept: RADIOLOGY | Facility: HOSPITAL | Age: 82
Discharge: HOME | End: 2017-04-04
Attending: ORTHOPAEDIC SURGERY
Payer: MEDICARE

## 2017-04-04 VITALS — WEIGHT: 140 LBS | BODY MASS INDEX: 30.2 KG/M2 | HEIGHT: 57 IN

## 2017-04-04 DIAGNOSIS — M25.511 RIGHT SHOULDER PAIN, UNSPECIFIED CHRONICITY: ICD-10-CM

## 2017-04-04 DIAGNOSIS — M25.511 RIGHT SHOULDER PAIN, UNSPECIFIED CHRONICITY: Primary | ICD-10-CM

## 2017-04-04 DIAGNOSIS — S42.351D: ICD-10-CM

## 2017-04-04 PROCEDURE — 73120 X-RAY EXAM OF HAND: CPT | Mod: TC,RT

## 2017-04-04 PROCEDURE — 73030 X-RAY EXAM OF SHOULDER: CPT | Mod: TC,RT

## 2017-04-04 PROCEDURE — 99213 OFFICE O/P EST LOW 20 MIN: CPT | Mod: PBBFAC,PO | Performed by: ORTHOPAEDIC SURGERY

## 2017-04-04 PROCEDURE — 73030 X-RAY EXAM OF SHOULDER: CPT | Mod: 26,RT,, | Performed by: RADIOLOGY

## 2017-04-04 PROCEDURE — 99024 POSTOP FOLLOW-UP VISIT: CPT | Mod: S$PBB,,, | Performed by: ORTHOPAEDIC SURGERY

## 2017-04-04 PROCEDURE — 99999 PR PBB SHADOW E&M-EST. PATIENT-LVL III: CPT | Mod: PBBFAC,,, | Performed by: ORTHOPAEDIC SURGERY

## 2017-04-04 PROCEDURE — 73120 X-RAY EXAM OF HAND: CPT | Mod: 26,RT,, | Performed by: RADIOLOGY

## 2017-04-04 NOTE — PROGRESS NOTES
Ms. Garcia in followup of right proximal humerus fracture.  She is about   three weeks out from injury.  She also injured her right small finger at the   base of the distal phalanx.  She is currently in the [facility] and is   receiving some therapy there for the shoulder.    PHYSICAL EXAMINATION:  RIGHT SHOULDER:  Shoulder is a little bit less tender, slightly swollen,   bruising is resolved.  Range of motion of elbow, wrist and fingers slightly   decreased.  Range of motion of shoulder limited secondary to pain.    X-RAYS:  AP and lateral of right shoulder, demonstrate moderately displaced   proximal humerus fracture, but no change in position and the joint well reduced.    The right small finger fracture looks nondisplaced and almost healed.    PLAN:  I have written a note to continue therapy on the right shoulder passive   range of motion and then progress to active assist.  She can also start weaning   out of the sling now.  Tylenol seems to be all she needs for pain.  I have given   her squeeze ball for the right hand.  Follow up in three to four weeks.      RITU  dd: 04/04/2017 09:55:47 (CDT)  td: 04/04/2017 23:43:28 (CDT)  Doc ID   #[id number]  Job ID #[id number]    CC:

## 2017-04-04 NOTE — MR AVS SNAPSHOT
[facility]  [address] [place] [address] [address]  [place] [address]  Phone: [phone]                  Annette Garcia   2017 8:00 AM   Office Visit    Description:  Female : 1935   Provider:  Kevin Saravia Jr., MD   Department:  [facility]           Reason for Visit     Right Shoulder - Pain           Diagnoses this Visit        Comments    Right shoulder pain, unspecified chronicity    -  Primary            To Do List           Future Appointments        Provider Department Dept Phone    2017 8:00 AM Damien Bernal MD [facility] [phone]    2017 10:00 AM BOB Marie [facility] [phone]    2017 9:20 AM Kevin Saravia Jr., MD [facility] [phone]      Goals (5 Years of Data)     None      [facility]     [facility] -  Assistance  Unless otherwise directed by your provider, please contact [facility], our nurse care line that is available for  assistance.     Registered nurses in the [facility] provide: appointment scheduling, clinical advisement, health education, and other advisory services.  Call: [phone] (toll free)               Medications                Verify that the below list of medications is an accurate representation of the medications you are currently taking.  If none reported, the list may be blank. If incorrect, please contact your healthcare provider. Carry this list with you in case of emergency.           Current Medications     acetaminophen (TYLENOL) 325 MG tablet Take 2 tablets (650 mg total) by mouth every 4 (four) hours as needed for Pain.    carbamazepine (EPITOL) 200 mg tablet Take 1 tablet (200 mg total) by mouth nightly.    carvedilol (COREG) 25 MG tablet Take 1 tablet (25 mg total) by mouth 2 (two) times daily with meals.    ferrous sulfate 325 (65 FE) MG EC tablet Take 1 tablet (325 mg total) by mouth once daily.     "levothyroxine (SYNTHROID) 100 MCG tablet TAKE 1 TABLET (100 MCG TOTAL) BY MOUTH ONCE DAILY.    lisinopril (PRINIVIL,ZESTRIL) 40 MG tablet Take 1 tablet (40 mg total) by mouth once daily.    magnesium oxide (MAG-OX) 400 mg tablet Take 1 tablet (400 mg total) by mouth once daily.    ondansetron (ZOFRAN-ODT) 8 MG TbDL Take 1 tablet (8 mg total) by mouth every 8 (eight) hours as needed.    phenobarbital (LUMINAL) 64.8 MG tablet Take 1 tablet (64.8 mg total) by mouth every evening.    polyethylene glycol (GLYCOLAX) 17 gram PwPk Take 17 g by mouth 2 (two) times daily as needed (Constipation).           Clinical Reference Information           Your Vitals Were     Height Weight BMI          4' 9" (1.448 m) 63.5 kg (140 lb) 30.3 kg/m2        Allergies as of 4/4/2017     Adhesive    Penicillins    Tramadol    Avelox [Moxifloxacin]    Amoxil [Amoxicillin]    Aspridrox [Aspirin, Buffered]    Codeine    Keflex [Cephalexin]    Norvasc [Amlodipine]    Red Dye    Sulfa (Sulfonamide Antibiotics)    Tylenol [Acetaminophen]    Vicks Vaporub [Camphor-eucalyptus Oil-menthol]      Immunizations Administered on Date of Encounter - 4/4/2017     None      Orders Placed During Today's Visit     Future Labs/Procedures Expected by Expires    X-Ray Hand 2 View Right  4/4/2017 4/4/2018    X-ray Shoulder 2 or More Views Right  4/4/2017 4/4/2018      Language Assistance Services     ATTENTION: Language assistance services are available, free of charge. Please call [phone].      ATENCIÓN: Si carlie jade, tiene a cox disposición servicios gratuitos de asistencia lingüística. Llame al [phone].     GABY Ý: N?u b?n nói Ti?ng Vi?t, có các d?ch v? h? tr? ngôn ng? mi?n phí dành cho b?n. G?i s? [phone].         [facility] complies with applicable Federal civil rights laws and does not discriminate on the basis of race, color, national origin, age, disability, or sex.        "

## 2017-04-07 ENCOUNTER — TELEPHONE (OUTPATIENT)
Dept: HEMATOLOGY/ONCOLOGY | Facility: CLINIC | Age: 82
End: 2017-04-07

## 2017-04-07 ENCOUNTER — OFFICE VISIT (OUTPATIENT)
Dept: NEUROLOGY | Facility: CLINIC | Age: 82
End: 2017-04-07
Payer: MEDICARE

## 2017-04-07 VITALS
HEIGHT: 59 IN | BODY MASS INDEX: 27.86 KG/M2 | SYSTOLIC BLOOD PRESSURE: 159 MMHG | HEART RATE: 70 BPM | WEIGHT: 138.19 LBS | DIASTOLIC BLOOD PRESSURE: 80 MMHG

## 2017-04-07 DIAGNOSIS — G40.A09 NONINTRACTABLE ABSENCE EPILEPSY WITHOUT STATUS EPILEPTICUS: Primary | Chronic | ICD-10-CM

## 2017-04-07 DIAGNOSIS — G40.209 PARTIAL SYMPTOMATIC EPILEPSY WITH COMPLEX PARTIAL SEIZURES, NOT INTRACTABLE, WITHOUT STATUS EPILEPTICUS: ICD-10-CM

## 2017-04-07 DIAGNOSIS — G40.A09 NONINTRACTABLE ABSENCE EPILEPSY WITHOUT STATUS EPILEPTICUS: Chronic | ICD-10-CM

## 2017-04-07 PROCEDURE — 99214 OFFICE O/P EST MOD 30 MIN: CPT | Mod: S$PBB,,, | Performed by: PSYCHIATRY & NEUROLOGY

## 2017-04-07 PROCEDURE — 99213 OFFICE O/P EST LOW 20 MIN: CPT | Mod: PBBFAC,PO | Performed by: PSYCHIATRY & NEUROLOGY

## 2017-04-07 PROCEDURE — 99999 PR PBB SHADOW E&M-EST. PATIENT-LVL III: CPT | Mod: PBBFAC,,, | Performed by: PSYCHIATRY & NEUROLOGY

## 2017-04-07 RX ORDER — OXCARBAZEPINE 300 MG/1
300 TABLET, FILM COATED ORAL NIGHTLY
Qty: 90 TABLET | Refills: 3 | Status: SHIPPED | OUTPATIENT
Start: 2017-04-07 | End: 2018-04-29 | Stop reason: SDUPTHER

## 2017-04-07 RX ORDER — OXCARBAZEPINE 300 MG/1
300 TABLET, FILM COATED ORAL NIGHTLY
Qty: 90 TABLET | Refills: 3 | Status: SHIPPED | OUTPATIENT
Start: 2017-04-07 | End: 2017-04-07 | Stop reason: SDUPTHER

## 2017-04-07 RX ORDER — OXCARBAZEPINE 300 MG/1
300 TABLET, FILM COATED ORAL NIGHTLY
Qty: 30 TABLET | Refills: 11 | Status: SHIPPED | OUTPATIENT
Start: 2017-04-07 | End: 2017-04-07 | Stop reason: SDUPTHER

## 2017-04-07 RX ORDER — PHENOBARBITAL 64.8 MG/1
64.8 TABLET ORAL NIGHTLY
Qty: 90 TABLET | Refills: 3 | Status: SHIPPED | OUTPATIENT
Start: 2017-04-07 | End: 2017-10-14 | Stop reason: SDUPTHER

## 2017-04-07 NOTE — TELEPHONE ENCOUNTER
L/m informing pt's daughter, Beatris, that CBC done at [facility] on 4/6/17 shows a Hemoglobin level of 10.7.

## 2017-04-07 NOTE — PROGRESS NOTES
Subjective:       Patient ID: Annette Garcia is a 81 y.o. female.    Chief Complaint:  Seizures      History of Present Illness  HPI   This is a 81-year-old female who seen by me for follow-up of a seizure disorder.  The patient has had a history of seizures since her childhood years.  She was initially evaluated at [facility] by Dr. Jonathon Dejesus and subsequently was being followed by Dr. Wilder in [facility] patient has been on phenobarbital and Tegretol for many years.  Her seizures have been well controlled on medication.  She reports that she had her last seizure about 20 years ago at which time she had been admitted to the hospital for some back problems and her seizure medicines were not continued following that admission and she had a cluster of brief seizures.  As reported that she has occasional blank stares and memory lapses though she has had occasional generalized seizures when she was young.  Medical records from Dr. Wilder were available for review.      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  She was accompanied by her daughter.  She has had a couple of falls resulting in hip problems and more recently right shoulder fracture for which she was treated conservatively and is presently in a skilled nursing unit for rehabilitation.  She is being discharged within a week of home and has daytime sitters and will continue with outpatient physical therapy.  She is here today as the insurance company will not cover the Tegretol anymore and this will be switched to Trileptal.  The patient has had no seizures recently The patient denies any headaches, blackouts or memory difficulties.  She has some difficulty with ambulation because of chronic back problems related to trauma when she was young.  She has had lumbar spine surgery at that time.  Recent blood tests done were noted.          Review of Systems  Review of Systems   Constitutional: Negative.    HENT: Negative.  Negative for hearing loss.    Eyes: Negative.  Negative for visual disturbance.   Respiratory: Negative.  Negative for shortness of breath.    Cardiovascular: Negative.  Negative for chest pain and palpitations.   Gastrointestinal: Negative.    Endocrine: Negative.    Genitourinary: Negative.    Musculoskeletal: Positive for back pain. Negative for gait problem and neck pain.   Skin: Negative.    Allergic/Immunologic: Negative.    Neurological: Positive for seizures. Negative for tremors, syncope, speech difficulty, weakness, numbness and headaches.   Hematological: Negative.    Psychiatric/Behavioral: Negative.  Negative for confusion and decreased concentration.       Objective:      Neurologic Exam     Mental Status   Oriented to person, place, and time.   Registration: recalls 3 of 3 objects. Follows 3 step commands.   Attention: normal. Concentration: normal.   Speech: speech is normal   Level of consciousness: alert  Knowledge: good.   Able to name object. Able to read. Able to repeat. Able to write. Normal comprehension.     Cranial Nerves   Cranial nerves II through XII intact.     Motor Exam   Muscle bulk: normal  Overall muscle tone: normal    Strength   Strength 5/5 throughout.     Sensory Exam   Light touch normal.   Proprioception normal.     Gait, Coordination, and Reflexes     Gait  Gait: normal (Walks a little stiffly because of back problems.  Uses a cane for stability)    Coordination   Romberg: negative  Finger to nose coordination: normal    Tremor   Resting tremor: absent  Intention tremor: absent  Action tremor: absent    Reflexes   Right brachioradialis: 1+  Left brachioradialis: 1+  Right biceps: 1+  Left biceps: 1+  Right triceps: 1+  Left triceps: 1+  Right patellar: 1+  Left patellar: 1+  Right achilles: 0  Left achilles: 0  Right plantar: normal  Left plantar: normal      Physical Exam    Constitutional: She is oriented to person, place, and time. She appears well-developed and well-nourished.   HENT:   Head: Normocephalic and atraumatic.   Neck: Normal range of motion. Neck supple. Carotid bruit is not present.   Neurological: She is oriented to person, place, and time. She has normal strength. She has a normal Finger-Nose-Finger Test and a normal Romberg Test. Gait normal.   Reflex Scores:       Tricep reflexes are 1+ on the right side and 1+ on the left side.       Bicep reflexes are 1+ on the right side and 1+ on the left side.       Brachioradialis reflexes are 1+ on the right side and 1+ on the left side.       Patellar reflexes are 1+ on the right side and 1+ on the left side.       Achilles reflexes are 0 on the right side and 0 on the left side.  Psychiatric: Her speech is normal.   Vitals reviewed.        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 Partial symptomatic epilepsy with complex partial seizures, not intractable, without status epilepticus            Plan:         Discussed with patient and daughter.  Seizure precautions including compliance stressed.  Continue phenobarbital.  We will switch Tegretol to Trileptal 300 mg at bedtime daily.  Follow-up in 6 months.

## 2017-04-07 NOTE — PATIENT INSTRUCTIONS
Discussed with patient and daughter.  Seizure precautions including compliance stressed.  Continue phenobarbital.  We will switch Tegretol to Trileptal 300 mg at bedtime daily.

## 2017-04-07 NOTE — TELEPHONE ENCOUNTER
I called pt's daughter, Beatris, to schedule a one month lab appt and f/u with Dr. Arredondo. She states she will call back once pt has been d/c from [place] to set up appts. She may want to see if pt can get weekly labs done. She may possibly be sent home with home health.

## 2017-04-07 NOTE — TELEPHONE ENCOUNTER
Pt wants to go home next week and will be having one last blood draw at [facility] next Tues. Plans to go home on Good Friday, 4/14/17. After that, says pt will need lab appts scheduled at [facility]. Just wanted to let you know.    ----- Message from Yamilka Torres sent at 4/7/2017 10:14 AM CDT -----  Contact: [phone]/daughter  Patient daughter is returning your call. Please advise

## 2017-04-12 ENCOUNTER — PATIENT MESSAGE (OUTPATIENT)
Dept: ORTHOPEDICS | Facility: CLINIC | Age: 82
End: 2017-04-12

## 2017-04-12 ENCOUNTER — PATIENT MESSAGE (OUTPATIENT)
Dept: HEMATOLOGY/ONCOLOGY | Facility: CLINIC | Age: 82
End: 2017-04-12

## 2017-04-13 ENCOUNTER — TELEPHONE (OUTPATIENT)
Dept: HEMATOLOGY/ONCOLOGY | Facility: CLINIC | Age: 82
End: 2017-04-13

## 2017-04-13 DIAGNOSIS — C83.30 DLBCL (DIFFUSE LARGE B CELL LYMPHOMA): Primary | Chronic | ICD-10-CM

## 2017-04-13 NOTE — TELEPHONE ENCOUNTER
Informed Beatris that we received pt's labs from [facility] today and that Dr. Arredondo will be checking a cbc and cmp on pt every other week. Pt will have labs drawn on 4/25/17 prior to appt with Dr. Saravia. Also informed Beatris that I will coordinate follow up appts for the pt to see both Dr. Simmons and Dr. Arredondo on the same day in 2 months from now. Beatris verbalized understanding.

## 2017-04-13 NOTE — TELEPHONE ENCOUNTER
CBC done at [facility] on 4/11/17 reveals hemoglobin 10.6, , WBC 2.8, neutrophils 68%, platelets 1 27,000

## 2017-04-17 ENCOUNTER — PATIENT OUTREACH (OUTPATIENT)
Dept: ADMINISTRATIVE | Facility: CLINIC | Age: 82
End: 2017-04-17
Payer: MEDICARE

## 2017-04-18 ENCOUNTER — TELEPHONE (OUTPATIENT)
Dept: CARDIOLOGY | Facility: CLINIC | Age: 82
End: 2017-04-18

## 2017-04-18 ENCOUNTER — PATIENT MESSAGE (OUTPATIENT)
Dept: CARDIOLOGY | Facility: CLINIC | Age: 82
End: 2017-04-18

## 2017-04-18 NOTE — TELEPHONE ENCOUNTER
----- Message from Christina Booth sent at 4/18/2017  3:59 PM CDT -----  Contact: ms huynh pt's joceline/[phone]  Please call regarding blood pressure reading.

## 2017-04-19 ENCOUNTER — OFFICE VISIT (OUTPATIENT)
Dept: FAMILY MEDICINE | Facility: CLINIC | Age: 82
End: 2017-04-19
Payer: MEDICARE

## 2017-04-19 ENCOUNTER — PATIENT OUTREACH (OUTPATIENT)
Dept: ADMINISTRATIVE | Facility: CLINIC | Age: 82
End: 2017-04-19
Payer: MEDICARE

## 2017-04-19 ENCOUNTER — TELEPHONE (OUTPATIENT)
Dept: FAMILY MEDICINE | Facility: CLINIC | Age: 82
End: 2017-04-19

## 2017-04-19 VITALS
DIASTOLIC BLOOD PRESSURE: 82 MMHG | HEIGHT: 59 IN | TEMPERATURE: 98 F | BODY MASS INDEX: 27.38 KG/M2 | OXYGEN SATURATION: 97 % | WEIGHT: 135.81 LBS | SYSTOLIC BLOOD PRESSURE: 160 MMHG | HEART RATE: 82 BPM

## 2017-04-19 DIAGNOSIS — S42.491D: ICD-10-CM

## 2017-04-19 DIAGNOSIS — R05.9 COUGH: Primary | ICD-10-CM

## 2017-04-19 PROCEDURE — 99213 OFFICE O/P EST LOW 20 MIN: CPT | Mod: S$GLB,,, | Performed by: FAMILY MEDICINE

## 2017-04-19 RX ORDER — AZITHROMYCIN 250 MG/1
TABLET, FILM COATED ORAL
Qty: 6 TABLET | Refills: 0 | Status: SHIPPED | OUTPATIENT
Start: 2017-04-19 | End: 2017-04-24

## 2017-04-19 NOTE — MR AVS SNAPSHOT
[facility]  [address] [address]  Phone: [phone]  Fax: [phone]                  Annette Garcia   2017 3:20 PM   Office Visit    Description:  Female : 1935   Provider:  Jose Valles MD   Department:  [facility]           Reason for Visit     Hypertension     Cough           Diagnoses this Visit        Comments    Cough    -  Primary     Fracture of humeral condyle, right, with routine healing, subsequent encounter                To Do List           Future Appointments        Provider Department Dept Phone    2017 8:40 AM APPOINTMENT LAB, [facility] [phone]    2017 9:20 AM Kevin Saravia Jr., MD [facility] [phone]    10/6/2017 8:00 AM Damien Bernal MD [facility] [phone]      Goals (5 Years of Data)     None       These Medications        Disp Refills Start End    azithromycin (Z-ARELY) 250 MG tablet 6 tablet 0 2017    Take 2 tablets by mouth on day 1; Take 1 tablet by mouth on days 2-5    Pharmacy: [facility]/pharmacy #[id number] [address] AT [facility] Ph #: [phone]         [facility] On [facility]     [facility] Care [facility] -  Assistance  Unless otherwise directed by your provider, please contact [facility], our nurse care line that is available for  assistance.     Registered nurses in the [facility] provide: appointment scheduling, clinical advisement, health education, and other advisory services.  Call: [phone] (toll free)               Medications           START taking these NEW medications        Refills    azithromycin (Z-ARELY) 250 MG tablet 0    Sig: Take 2 tablets by mouth on day 1; Take 1 tablet by mouth on days 2-5    Class: Print      STOP taking these medications     ferrous sulfate 325 (65 FE) MG EC tablet Take 1 tablet (325 mg total) by mouth once  "daily.    magnesium oxide (MAG-OX) 400 mg tablet Take 1 tablet (400 mg total) by mouth once daily.           Verify that the below list of medications is an accurate representation of the medications you are currently taking.  If none reported, the list may be blank. If incorrect, please contact your healthcare provider. Carry this list with you in case of emergency.           Current Medications     acetaminophen (TYLENOL) 325 MG tablet Take 2 tablets (650 mg total) by mouth every 4 (four) hours as needed for Pain.    azithromycin (Z-ARELY) 250 MG tablet Take 2 tablets by mouth on day 1; Take 1 tablet by mouth on days 2-5    carvedilol (COREG) 25 MG tablet Take 1 tablet (25 mg total) by mouth 2 (two) times daily with meals.    levothyroxine (SYNTHROID) 100 MCG tablet TAKE 1 TABLET (100 MCG TOTAL) BY MOUTH ONCE DAILY.    lisinopril (PRINIVIL,ZESTRIL) 40 MG tablet Take 1 tablet (40 mg total) by mouth once daily.    ondansetron (ZOFRAN-ODT) 8 MG TbDL Take 1 tablet (8 mg total) by mouth every 8 (eight) hours as needed.    oxcarbazepine (TRILEPTAL) 300 MG Tab Take 1 tablet (300 mg total) by mouth nightly.    phenobarbital (LUMINAL) 64.8 MG tablet Take 1 tablet (64.8 mg total) by mouth every evening.    polyethylene glycol (GLYCOLAX) 17 gram PwPk Take 17 g by mouth 2 (two) times daily as needed (Constipation).           Clinical Reference Information           Your Vitals Were     BP Pulse Temp Height Weight SpO2    160/82 82 97.8 °F (36.6 °C) (Oral) 4' 11" (1.499 m) 61.6 kg (135 lb 12.9 oz) 97%    BMI                27.43 kg/m2          Blood Pressure          Most Recent Value    BP  (!)  160/82      Allergies as of 4/19/2017     Adhesive    Penicillins    Tramadol    Avelox [Moxifloxacin]    Amoxil [Amoxicillin]    Aspridrox [Aspirin, Buffered]    Codeine    Keflex [Cephalexin]    Norvasc [Amlodipine]    Red Dye    Sulfa (Sulfonamide Antibiotics)    Tylenol [Acetaminophen]    Vicks Vaporub [Camphor-eucalyptus " Oil-menthol]      Immunizations Administered on Date of Encounter - 4/19/2017     None      Orders Placed During Today's Visit      Normal Orders This Visit    HOSPITAL BED FOR HOME USE       Language Assistance Services     ATTENTION: Language assistance services are available, free of charge. Please call [phone].      ATENCIÓN: Si carlie jade, tiene a cox disposición servicios gratuitos de asistencia lingüística. Llame al [phone].     CHÚ Ý: N?u b?n nói Ti?ng Vi?t, có các d?ch v? h? tr? ngôn ng? mi?n phí dành cho b?n. G?i s? [phone].         [facility] complies with applicable Federal civil rights laws and does not discriminate on the basis of race, color, national origin, age, disability, or sex.

## 2017-04-19 NOTE — TELEPHONE ENCOUNTER
----- Message from Elida Youssef sent at 4/19/2017  1:10 PM CDT -----  Contact: Lorenza/ [phone]  Daughter states she has a real bad cough. Blood pressure really high. Patient also had a fall from her white blood cells being so low after chemo. Patient would like to be seen today.

## 2017-04-21 NOTE — PROGRESS NOTES
Patient ID: Annette Garcia is a 81 y.o. female.    Chief Complaint: Hypertension and Cough    HPI      Annette Garcia is a 81 y.o. female who recently had a fall with fracture of her humerus and her shoulder is now immobilized.  Patient is to frail to undergo surgical replacement of shoulder-with orthopedist consultation decided on nonoperative treatment.  Went to rehabilitation for approximately 2 weeks sent home.  Patient doing well at home but needs caregiver.  Patient is not able to arise from a laying position or position herself well because of the right shoulder immobilized and humerus fracture.  She basically has no use of her right arm.  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  Patient complains of a few day history of nasal congestion mostly clear postnasal drip cough occasionally productive.  No fever chills nausea vomiting or diarrhea.  No wheezing.            Review of Symptoms    Constitutional  Neg activity change, No chills /fever   Resp  Neg hemoptysis, stridor, choking  CVS  Neg chest pain, palpitations    Physical Exam    Constitutional:  Oriented to person, place, and time.appears well-developed and well-nourished.  No distress.      HENT  Head: Normocephalic and atraumatic  Right Ear: External ear normal.   Left Ear: External ear normal.   Nose: External nose normal.   Mouth:  Moist mucus membranes.    Eyes:  Conjunctivae are normal. Right eye exhibits no discharge.  Left eye exhibits no discharge. No scleral icterus.  No periorbital edema    Cardiovascular:  Regular rate, Regular rhythm     No extrasystole  Normal S1-S2      Pulmonary/Chest:   Normal effort and breath sounds.  No wheezing, rhonchi or rales.    Musculoskeletal:  No edema. No obvious deformity No waisting       Neurological:  Alert and oriented to person, place, and time.   Coordination normal.     Skin:   Skin is warm and dry.  No diaphoresis.   No rash noted.     Psychiatric: Normal  mood and affect. Behavior is normal.  Judgment and thought content normal.       Assessment / Plan:      ICD-10-CM ICD-9-CM   1. Cough R05 786.2   2. Fracture of humeral condyle, right, with routine healing, subsequent encounter S42.491D V54.11     Cough    Fracture of humeral condyle, right, with routine healing, subsequent encounter  -     HOSPITAL BED FOR HOME USE    Other orders  -     azithromycin (Z-ARELY) 250 MG tablet; Take 2 tablets by mouth on day 1; Take 1 tablet by mouth on days 2-5  Dispense: 6 tablet; Refill: 0      I consider her immune compromised treat with antibiotics

## 2017-04-25 ENCOUNTER — HOSPITAL ENCOUNTER (OUTPATIENT)
Dept: RADIOLOGY | Facility: HOSPITAL | Age: 82
Discharge: HOME | End: 2017-04-25
Attending: ORTHOPAEDIC SURGERY
Payer: MEDICARE

## 2017-04-25 ENCOUNTER — LAB VISIT (OUTPATIENT)
Dept: LAB | Facility: HOSPITAL | Age: 82
End: 2017-04-25
Attending: INTERNAL MEDICINE
Payer: MEDICARE

## 2017-04-25 ENCOUNTER — OFFICE VISIT (OUTPATIENT)
Dept: ORTHOPEDICS | Facility: CLINIC | Age: 82
End: 2017-04-25
Payer: MEDICARE

## 2017-04-25 VITALS — WEIGHT: 135 LBS | HEIGHT: 59 IN | BODY MASS INDEX: 27.21 KG/M2

## 2017-04-25 DIAGNOSIS — C83.30 DLBCL (DIFFUSE LARGE B CELL LYMPHOMA): Primary | Chronic | ICD-10-CM

## 2017-04-25 DIAGNOSIS — S42.351D: Primary | ICD-10-CM

## 2017-04-25 DIAGNOSIS — T14.8XXA FRACTURE: ICD-10-CM

## 2017-04-25 DIAGNOSIS — C83.30 DLBCL (DIFFUSE LARGE B CELL LYMPHOMA): Chronic | ICD-10-CM

## 2017-04-25 DIAGNOSIS — T14.8XXA FRACTURE: Primary | ICD-10-CM

## 2017-04-25 LAB
ALBUMIN SERPL BCP-MCNC: 3.5 G/DL
ALP SERPL-CCNC: 112 U/L
ALT SERPL W/O P-5'-P-CCNC: 10 U/L
ANION GAP SERPL CALC-SCNC: 9 MMOL/L
AST SERPL-CCNC: 13 U/L
BASOPHILS # BLD AUTO: 0.01 K/UL
BASOPHILS NFR BLD: 0.2 %
BILIRUB SERPL-MCNC: 0.4 MG/DL
BUN SERPL-MCNC: 9 MG/DL
CALCIUM SERPL-MCNC: 9.3 MG/DL
CHLORIDE SERPL-SCNC: 98 MMOL/L
CO2 SERPL-SCNC: 26 MMOL/L
CREAT SERPL-MCNC: 0.7 MG/DL
DIFFERENTIAL METHOD: ABNORMAL
EOSINOPHIL # BLD AUTO: 0.1 K/UL
EOSINOPHIL NFR BLD: 1.9 %
ERYTHROCYTE [DISTWIDTH] IN BLOOD BY AUTOMATED COUNT: 16.2 %
EST. GFR  (AFRICAN AMERICAN): >60 ML/MIN/1.73 M^2
EST. GFR  (NON AFRICAN AMERICAN): >60 ML/MIN/1.73 M^2
GLUCOSE SERPL-MCNC: 161 MG/DL
HCT VFR BLD AUTO: 30.3 %
HGB BLD-MCNC: 10.2 G/DL
LYMPHOCYTES # BLD AUTO: 0.9 K/UL
LYMPHOCYTES NFR BLD: 18.7 %
MCH RBC QN AUTO: 33.6 PG
MCHC RBC AUTO-ENTMCNC: 33.7 %
MCV RBC AUTO: 100 FL
MONOCYTES # BLD AUTO: 0.4 K/UL
MONOCYTES NFR BLD: 8.4 %
NEUTROPHILS # BLD AUTO: 3.4 K/UL
NEUTROPHILS NFR BLD: 70.4 %
PLATELET # BLD AUTO: 148 K/UL
PMV BLD AUTO: 9.5 FL
POTASSIUM SERPL-SCNC: 4.1 MMOL/L
PROT SERPL-MCNC: 6.6 G/DL
RBC # BLD AUTO: 3.04 M/UL
SODIUM SERPL-SCNC: 133 MMOL/L
WBC # BLD AUTO: 4.86 K/UL

## 2017-04-25 PROCEDURE — 99212 OFFICE O/P EST SF 10 MIN: CPT | Mod: PBBFAC,PO,25 | Performed by: ORTHOPAEDIC SURGERY

## 2017-04-25 PROCEDURE — 99999 PR PBB SHADOW E&M-EST. PATIENT-LVL II: CPT | Mod: PBBFAC,,, | Performed by: ORTHOPAEDIC SURGERY

## 2017-04-25 PROCEDURE — 80053 COMPREHEN METABOLIC PANEL: CPT

## 2017-04-25 PROCEDURE — 85025 COMPLETE CBC W/AUTO DIFF WBC: CPT

## 2017-04-25 PROCEDURE — 73030 X-RAY EXAM OF SHOULDER: CPT | Mod: 26,RT,, | Performed by: RADIOLOGY

## 2017-04-25 PROCEDURE — 99024 POSTOP FOLLOW-UP VISIT: CPT | Mod: S$PBB,,, | Performed by: ORTHOPAEDIC SURGERY

## 2017-04-25 PROCEDURE — 36415 COLL VENOUS BLD VENIPUNCTURE: CPT

## 2017-04-25 NOTE — PROGRESS NOTES
HISTORY OF PRESENT ILLNESS:  Ms. Garcia is about five and a half weeks out   from right proximal humerus fracture and right small finger fracture.  She is   doing better, currently in therapy, making progress, having minimal pain.    PHYSICAL EXAMINATION:  RIGHT SHOULDER:  Fracture site is nontender.  No significant swelling.  Range of   motion is limited secondary to pain and some stiffness, but she does have some   active elevation now.  The right hand also has some limited flexion, but no   tenderness at the small finger.    X-RAYS:  AP and lateral of the right shoulder demonstrate healing proximal   humerus fracture.  There is some offset of the greater tuberosity.  No change in   position.    PLAN:  I think she can discontinue the sling now.  Continue exercises both at   therapy and at home, increase use of the right arm including active elevation   now and use of a squeeze ball for the right hand.  Follow up in three to four   weeks.      RITU  dd: 04/25/2017 10:22:20 (CDT)  td: 04/26/2017 00:06:45 (CDT)  Doc ID   #[id number]  Job ID #[id number]    CC:

## 2017-04-25 NOTE — TELEPHONE ENCOUNTER
Per Dr. Arredondo, informed pt's daughter, Beatris, of hemoglobin level. Schedule appt for CBC and CMP to be drawn in 2 weeks at [facility].    ----- Message from Tyson Arredondo MD sent at 4/25/2017 12:03 PM CDT -----  Her hemoglobin is good at 10.2.  Please inform patient's daughter.

## 2017-04-25 NOTE — MR AVS SNAPSHOT
[facility]  [address]  [place] [address]  Phone: [phone]                  Annette Garcia   2017 9:20 AM   Office Visit    Description:  Female : 1935   Provider:  Kevin Saravia Jr., MD   Department:  [place] [facility]           Reason for Visit     Right Shoulder - Pain                To Do List           Future Appointments        Provider Department Dept Phone    2017 10:30 AM [facility] XR1 [facility] [phone]    2017 11:00 AM Kevin Saravia Jr., MD [facility] [phone]    10/6/2017 8:00 AM Damien Bernal MD [facility]rology [phone]      Goals (5 Years of Data)     None      [facility]     [facility] Care [facility] -  Assistance  Unless otherwise directed by your provider, please contact [facility], our nurse care line that is available for  assistance.     Registered nurses in the [facility] provide: appointment scheduling, clinical advisement, health education, and other advisory services.  Call: [phone] (toll free)               Medications                Verify that the below list of medications is an accurate representation of the medications you are currently taking.  If none reported, the list may be blank. If incorrect, please contact your healthcare provider. Carry this list with you in case of emergency.           Current Medications     acetaminophen (TYLENOL) 325 MG tablet Take 2 tablets (650 mg total) by mouth every 4 (four) hours as needed for Pain.    carvedilol (COREG) 25 MG tablet Take 1 tablet (25 mg total) by mouth 2 (two) times daily with meals.    levothyroxine (SYNTHROID) 100 MCG tablet TAKE 1 TABLET (100 MCG TOTAL) BY MOUTH ONCE DAILY.    lisinopril (PRINIVIL,ZESTRIL) 40 MG tablet Take 1 tablet (40 mg total) by mouth once daily.    ondansetron (ZOFRAN-ODT) 8 MG TbDL Take 1 tablet (8 mg total) by mouth every 8 (eight) hours as  "needed.    oxcarbazepine (TRILEPTAL) 300 MG Tab Take 1 tablet (300 mg total) by mouth nightly.    phenobarbital (LUMINAL) 64.8 MG tablet Take 1 tablet (64.8 mg total) by mouth every evening.    polyethylene glycol (GLYCOLAX) 17 gram PwPk Take 17 g by mouth 2 (two) times daily as needed (Constipation).           Clinical Reference Information           Your Vitals Were     Height Weight BMI          4' 11" (1.499 m) 61.2 kg (135 lb) 27.27 kg/m2        Allergies as of 4/25/2017     Adhesive    Penicillins    Tramadol    Avelox [Moxifloxacin]    Amoxil [Amoxicillin]    Aspridrox [Aspirin, Buffered]    Codeine    Keflex [Cephalexin]    Norvasc [Amlodipine]    Red Dye    Sulfa (Sulfonamide Antibiotics)    Tylenol [Acetaminophen]    Vicks Vaporub [Camphor-eucalyptus Oil-menthol]      Immunizations Administered on Date of Encounter - 4/25/2017     None      Language Assistance Services     ATTENTION: Language assistance services are available, free of charge. Please call [phone].      ATENCIÓN: Si bekahla kalie, tiene a cox disposición servicios gratuitos de asistencia lingüística. Llame al [phone].     GABY Ý: N?u b?n nói Ti?ng Vi?t, có các d?ch v? h? tr? ngôn ng? mi?n phí dành cho b?n. G?i s? [phone].         [facility] complies with applicable Federal civil rights laws and does not discriminate on the basis of race, color, national origin, age, disability, or sex.        "

## 2017-04-26 NOTE — TELEPHONE ENCOUNTER
Informed pt's daughter, Beatris, that appts with Dr. Simmons and Dr. Arredondo have been scheduled for 6/27/17. Lab appt scheduled for 6/23/17 at [facility]. Labs ordered fasting per Beatris's request b/c pt's blood sugar was high on last lab result. Lipid panel order linked to lab appt on 5/9/17 b/c Beatris states it was ordered a few months ago by Dr. Simmons but has not been checked yet. She will make sure pt is fasting for labs.

## 2017-04-27 ENCOUNTER — TELEPHONE (OUTPATIENT)
Dept: CARDIOLOGY | Facility: CLINIC | Age: 82
End: 2017-04-27

## 2017-04-27 NOTE — TELEPHONE ENCOUNTER
----- Message from Timmy Simmons MD sent at 4/27/2017  8:33 AM CDT -----  Contact: daughter  PLease let Ms Garcia know that I would like to see her in clinic to discuss her BP meds and changes I would like to make  THanks  ALEX    ----- Message -----     From: Rashmi Kessler MA     Sent: 4/18/2017   4:40 PM       To: Timmy Simmons MD    Annette Garcia BP readings for last few days.  I left a message for the nurse to inform you of her high readings.     14th   4pm  183/85   15th 12:30 139/74  5pm 173/94   16th 11am 153/84  4pm 141/71   17th 8:30 am 180/86   4pm 167/79   18th 3:00 pm 193/91 pulse 72     Patient would like to know if their is any other medication that she can take so that it will stop being so high. Its causing concern.

## 2017-05-09 ENCOUNTER — LAB VISIT (OUTPATIENT)
Dept: LAB | Facility: HOSPITAL | Age: 82
End: 2017-05-09
Attending: INTERNAL MEDICINE
Payer: MEDICARE

## 2017-05-09 ENCOUNTER — TELEPHONE (OUTPATIENT)
Dept: HEMATOLOGY/ONCOLOGY | Facility: CLINIC | Age: 82
End: 2017-05-09

## 2017-05-09 DIAGNOSIS — C83.30 DLBCL (DIFFUSE LARGE B CELL LYMPHOMA): Chronic | ICD-10-CM

## 2017-05-09 DIAGNOSIS — E78.5 HYPERLIPIDEMIA, UNSPECIFIED HYPERLIPIDEMIA TYPE: ICD-10-CM

## 2017-05-09 LAB
ALBUMIN SERPL BCP-MCNC: 4.3 G/DL
ALP SERPL-CCNC: 102 IU/L
ALT SERPL W/O P-5'-P-CCNC: 24 IU/L
ANION GAP SERPL CALC-SCNC: 13 MMOL/L
AST SERPL-CCNC: 21 IU/L
BASOPHILS # BLD AUTO: 0.02 K/UL
BASOPHILS NFR BLD: 0.3 %
BILIRUB SERPL-MCNC: 0.6 MG/DL
BUN SERPL-MCNC: 12 MG/DL
CALCIUM SERPL-MCNC: 9.7 MG/DL
CHLORIDE SERPL-SCNC: 97 MMOL/L
CHOLEST/HDLC SERPL: 3.4 {RATIO}
CO2 SERPL-SCNC: 29 MMOL/L
CREAT SERPL-MCNC: 0.54 MG/DL
DIFFERENTIAL METHOD: ABNORMAL
EOSINOPHIL # BLD AUTO: 0.1 K/UL
EOSINOPHIL NFR BLD: 1.3 %
ERYTHROCYTE [DISTWIDTH] IN BLOOD BY AUTOMATED COUNT: 16 %
EST. GFR  (AFRICAN AMERICAN): >60 ML/MIN/1.73 M^2
EST. GFR  (NON AFRICAN AMERICAN): >60 ML/MIN/1.73 M^2
GLUCOSE SERPL-MCNC: 104 MG/DL
HCT VFR BLD AUTO: 33.5 %
HDL/CHOLESTEROL RATIO: 29.1 %
HDLC SERPL-MCNC: 220 MG/DL
HDLC SERPL-MCNC: 64 MG/DL
HGB BLD-MCNC: 11.3 G/DL
LDLC SERPL CALC-MCNC: 124.8 MG/DL
LYMPHOCYTES # BLD AUTO: 1.4 K/UL
LYMPHOCYTES NFR BLD: 22.8 %
MCH RBC QN AUTO: 35.5 PG
MCHC RBC AUTO-ENTMCNC: 33.7 %
MCV RBC AUTO: 105 FL
MONOCYTES # BLD AUTO: 0.5 K/UL
MONOCYTES NFR BLD: 7.4 %
NEUTROPHILS # BLD AUTO: 4.1 K/UL
NEUTROPHILS NFR BLD: 67.7 %
NONHDLC SERPL-MCNC: 156 MG/DL
PLATELET # BLD AUTO: 131 K/UL
PMV BLD AUTO: 10.4 FL
POTASSIUM SERPL-SCNC: 4 MMOL/L
PROT SERPL-MCNC: 7 G/DL
RBC # BLD AUTO: 3.18 M/UL
SODIUM SERPL-SCNC: 139 MMOL/L
TRIGL SERPL-MCNC: 156 MG/DL
WBC # BLD AUTO: 6.09 K/UL

## 2017-05-09 PROCEDURE — 80053 COMPREHEN METABOLIC PANEL: CPT | Mod: PO

## 2017-05-09 PROCEDURE — 80061 LIPID PANEL: CPT

## 2017-05-09 PROCEDURE — 36415 COLL VENOUS BLD VENIPUNCTURE: CPT | Mod: PO

## 2017-05-09 PROCEDURE — 85025 COMPLETE CBC W/AUTO DIFF WBC: CPT | Mod: PO

## 2017-05-16 ENCOUNTER — HOSPITAL ENCOUNTER (OUTPATIENT)
Dept: RADIOLOGY | Facility: HOSPITAL | Age: 82
Discharge: HOME | End: 2017-05-16
Attending: ORTHOPAEDIC SURGERY
Payer: MEDICARE

## 2017-05-16 ENCOUNTER — OFFICE VISIT (OUTPATIENT)
Dept: ORTHOPEDICS | Facility: CLINIC | Age: 82
End: 2017-05-16
Payer: MEDICARE

## 2017-05-16 VITALS — WEIGHT: 135 LBS | BODY MASS INDEX: 27.21 KG/M2 | HEIGHT: 59 IN

## 2017-05-16 DIAGNOSIS — M25.511 RIGHT SHOULDER PAIN, UNSPECIFIED CHRONICITY: ICD-10-CM

## 2017-05-16 DIAGNOSIS — S42.351D: ICD-10-CM

## 2017-05-16 DIAGNOSIS — M25.511 RIGHT SHOULDER PAIN, UNSPECIFIED CHRONICITY: Primary | ICD-10-CM

## 2017-05-16 PROCEDURE — 99213 OFFICE O/P EST LOW 20 MIN: CPT | Mod: PBBFAC,25,PO | Performed by: ORTHOPAEDIC SURGERY

## 2017-05-16 PROCEDURE — 73140 X-RAY EXAM OF FINGER(S): CPT | Mod: 26,,, | Performed by: RADIOLOGY

## 2017-05-16 PROCEDURE — 73030 X-RAY EXAM OF SHOULDER: CPT | Mod: 26,RT,, | Performed by: RADIOLOGY

## 2017-05-16 PROCEDURE — 73030 X-RAY EXAM OF SHOULDER: CPT | Mod: TC,RT

## 2017-05-16 PROCEDURE — 73140 X-RAY EXAM OF FINGER(S): CPT | Mod: TC

## 2017-05-16 PROCEDURE — 99024 POSTOP FOLLOW-UP VISIT: CPT | Mod: S$PBB,,, | Performed by: ORTHOPAEDIC SURGERY

## 2017-05-16 PROCEDURE — 99999 PR PBB SHADOW E&M-EST. PATIENT-LVL III: CPT | Mod: PBBFAC,,, | Performed by: ORTHOPAEDIC SURGERY

## 2017-05-16 NOTE — MR AVS SNAPSHOT
[facility]  [address] [place] [address] [address]  [place] [address]  Phone: [phone]                  Annette Garcia   2017 11:00 AM   Office Visit    Description:  Female : 1935   Provider:  Kevin Saravia Jr., MD   Department:  [place] [facility]           Reason for Visit     Right Upper Arm - Fracture, Follow-up           Diagnoses this Visit        Comments    Right shoulder pain, unspecified chronicity    -  Primary     Closed comminuted fracture of right humerus, with routine healing, subsequent encounter                To Do List           Future Appointments        Provider Department Dept Phone    2017 9:00 AM LAB, [place] [facility] [phone]    2017 9:00 AM [facility], [place] [facility] [phone]    2017 9:40 AM MD [place] [facility] [phone]    2017 10:30 AM MD [place] [facility] Hematology Oncology [phone]    2017 11:00 AM Kvein Saravia Jr., MD [facility] [phone]      Goals (5 Years of Data)     None      [facility] On [facility]     [facility] Care [facility] -  Assistance  Unless otherwise directed by your provider, please contact [facility], our nurse care line that is available for  assistance.     Registered nurses in the [facility] provide: appointment scheduling, clinical advisement, health education, and other advisory services.  Call: [phone] (toll free)               Medications                Verify that the below list of medications is an accurate representation of the medications you are currently taking.  If none reported, the list may be blank. If incorrect, please contact your healthcare provider. Carry this list with you in case of emergency.           Current Medications     acetaminophen (TYLENOL) 325 MG tablet Take 2 tablets (650 mg total) by mouth every 4 (four) hours as needed for Pain.     "carvedilol (COREG) 25 MG tablet Take 1 tablet (25 mg total) by mouth 2 (two) times daily with meals.    levothyroxine (SYNTHROID) 100 MCG tablet TAKE 1 TABLET (100 MCG TOTAL) BY MOUTH ONCE DAILY.    lisinopril (PRINIVIL,ZESTRIL) 40 MG tablet Take 1 tablet (40 mg total) by mouth once daily.    ondansetron (ZOFRAN-ODT) 8 MG TbDL Take 1 tablet (8 mg total) by mouth every 8 (eight) hours as needed.    oxcarbazepine (TRILEPTAL) 300 MG Tab Take 1 tablet (300 mg total) by mouth nightly.    phenobarbital (LUMINAL) 64.8 MG tablet Take 1 tablet (64.8 mg total) by mouth every evening.    polyethylene glycol (GLYCOLAX) 17 gram PwPk Take 17 g by mouth 2 (two) times daily as needed (Constipation).           Clinical Reference Information           Your Vitals Were     Height Weight BMI          4' 11" (1.499 m) 61.2 kg (135 lb) 27.27 kg/m2        Allergies as of 5/16/2017     Adhesive    Penicillins    Tramadol    Avelox [Moxifloxacin]    Amoxil [Amoxicillin]    Aspridrox [Aspirin, Buffered]    Codeine    Keflex [Cephalexin]    Norvasc [Amlodipine]    Red Dye    Sulfa (Sulfonamide Antibiotics)    Tylenol [Acetaminophen]    Vicks Vaporub [Camphor-eucalyptus Oil-menthol]      Immunizations Administered on Date of Encounter - 5/16/2017     None      Orders Placed During Today's Visit      Normal Orders This Visit    Ambulatory Referral to Physical/Occupational Therapy     Future Labs/Procedures Expected by Expires    X-Ray Finger 2 View  5/16/2017 5/16/2018    X-ray Shoulder 2 or More Views Right  5/16/2017 5/16/2018      Language Assistance Services     ATTENTION: Language assistance services are available, free of charge. Please call [phone].      ATENCIÓN: Si carlie jade, tiene a cox disposición servicios gratuitos de asistencia lingüística. Llame al [phone].     CHÚ Ý: N?u b?n nói Ti?ng Vi?t, có các d?ch v? h? tr? ngôn ng? mi?n phí dành cho b?n. G?i s? [phone].         [facility] complies with " applicable Federal civil rights laws and does not discriminate on the basis of race, color, national origin, age, disability, or sex.

## 2017-05-16 NOTE — PROGRESS NOTES
HISTORY OF PRESENT ILLNESS:  Ms. Garcia in followup of right proximal humerus   fracture and right finger fracture.  She is about nine weeks' out from injury.    She is currently in therapy, reporting less pain now.    PHYSICAL EXAMINATION:  RIGHT SHOULDER:  No tenderness.  Slight swelling.  Range of motion is limited,   but she has better elevation now on abduction.  Strength is weak.  RIGHT HAND:  Demonstrates also some limited  and some stiffness as well.    PLAN:  I have reordered therapy for another four to six weeks.  I want her to   continue with use of the right shoulder and arm, also the hand using a squeeze   ball for strengthening.    X-RAYS:  AP and lateral of right shoulder demonstrates basically healed fracture   of the proximal humerus with slight offset of the humeral head.  No change in   position and the finger fracture also looks good, well healed.    Follow up in four to six weeks.      RITU  dd: 05/16/2017 12:01:05 (CDT)  td: 05/17/2017 08:43:37 (CDT)  Doc ID   #[id number]  Job ID #[id number]    CC:

## 2017-05-22 ENCOUNTER — TELEPHONE (OUTPATIENT)
Dept: SURGERY | Facility: CLINIC | Age: 82
End: 2017-05-22

## 2017-05-22 NOTE — TELEPHONE ENCOUNTER
----- Message from Allie Blancas sent at 5/22/2017  1:31 PM CDT -----  Contact: [phone]  Pt daughter requesting a nurse call back. Please advise.     1337-returned call, no answer, busy signal.  1500 spoke with Daughter, Lorenza, and she was wanting to know if the clinic handled patients billing. I explained there is a separate department, the billing department that handles the billing. She also wanted some of her mothers medical records from when she was hospitalizied and I directed her to [facility]. She voiced understanding.

## 2017-05-23 ENCOUNTER — LAB VISIT (OUTPATIENT)
Dept: LAB | Facility: HOSPITAL | Age: 82
End: 2017-05-23
Attending: INTERNAL MEDICINE
Payer: MEDICARE

## 2017-05-23 DIAGNOSIS — C83.30 DLBCL (DIFFUSE LARGE B CELL LYMPHOMA): Chronic | ICD-10-CM

## 2017-05-23 LAB
ALBUMIN SERPL BCP-MCNC: 4 G/DL
ALP SERPL-CCNC: 97 IU/L
ALT SERPL W/O P-5'-P-CCNC: 31 IU/L
ANION GAP SERPL CALC-SCNC: 11 MMOL/L
AST SERPL-CCNC: 20 IU/L
BASOPHILS # BLD AUTO: 0.01 K/UL
BASOPHILS NFR BLD: 0.5 %
BILIRUB SERPL-MCNC: 0.4 MG/DL
BUN SERPL-MCNC: 9 MG/DL
CALCIUM SERPL-MCNC: 9.2 MG/DL
CHLORIDE SERPL-SCNC: 97 MMOL/L
CO2 SERPL-SCNC: 28 MMOL/L
CREAT SERPL-MCNC: 0.54 MG/DL
DIFFERENTIAL METHOD: ABNORMAL
EOSINOPHIL # BLD AUTO: 0.1 K/UL
EOSINOPHIL NFR BLD: 2.4 %
ERYTHROCYTE [DISTWIDTH] IN BLOOD BY AUTOMATED COUNT: 14.3 %
EST. GFR  (AFRICAN AMERICAN): >60 ML/MIN/1.73 M^2
EST. GFR  (NON AFRICAN AMERICAN): >60 ML/MIN/1.73 M^2
GLUCOSE SERPL-MCNC: 106 MG/DL
HCT VFR BLD AUTO: 32.5 %
HGB BLD-MCNC: 10.9 G/DL
LYMPHOCYTES # BLD AUTO: 1.3 K/UL
LYMPHOCYTES NFR BLD: 59.7 %
MCH RBC QN AUTO: 35.2 PG
MCHC RBC AUTO-ENTMCNC: 33.5 %
MCV RBC AUTO: 105 FL
MONOCYTES # BLD AUTO: 0.7 K/UL
MONOCYTES NFR BLD: 30.8 %
NEUTROPHILS # BLD AUTO: 0.1 K/UL
NEUTROPHILS NFR BLD: 6.6 %
PLATELET # BLD AUTO: 106 K/UL
PMV BLD AUTO: 10.1 FL
POTASSIUM SERPL-SCNC: 4.1 MMOL/L
PROT SERPL-MCNC: 6.8 G/DL
RBC # BLD AUTO: 3.1 M/UL
SODIUM SERPL-SCNC: 136 MMOL/L
WBC # BLD AUTO: 2.11 K/UL

## 2017-05-23 PROCEDURE — 85025 COMPLETE CBC W/AUTO DIFF WBC: CPT | Mod: PO

## 2017-05-23 PROCEDURE — 36415 COLL VENOUS BLD VENIPUNCTURE: CPT | Mod: PO

## 2017-05-23 PROCEDURE — 80053 COMPREHEN METABOLIC PANEL: CPT | Mod: PO

## 2017-05-24 ENCOUNTER — TELEPHONE (OUTPATIENT)
Dept: HEMATOLOGY/ONCOLOGY | Facility: CLINIC | Age: 82
End: 2017-05-24

## 2017-05-24 NOTE — TELEPHONE ENCOUNTER
Pt was given Dr. Arredondo's message re: hb level of 10.9 and lab appt for 2 weeks was made for 6/6/17 at [facility] lab.    ----- Message from Tyson Arredondo MD sent at 5/24/2017  1:41 PM CDT -----  Hb 10.9. That's good and stable. Pls inform pt/daughter.

## 2017-06-06 ENCOUNTER — LAB VISIT (OUTPATIENT)
Dept: LAB | Facility: HOSPITAL | Age: 82
End: 2017-06-06
Attending: INTERNAL MEDICINE
Payer: MEDICARE

## 2017-06-06 DIAGNOSIS — C83.30 DLBCL (DIFFUSE LARGE B CELL LYMPHOMA): Chronic | ICD-10-CM

## 2017-06-06 LAB
ALBUMIN SERPL BCP-MCNC: 4.1 G/DL
ALP SERPL-CCNC: 88 IU/L
ALT SERPL W/O P-5'-P-CCNC: 17 IU/L
ANION GAP SERPL CALC-SCNC: 12 MMOL/L
AST SERPL-CCNC: 31 IU/L
BASOPHILS # BLD AUTO: 0.01 K/UL
BASOPHILS NFR BLD: 0.2 %
BILIRUB SERPL-MCNC: 0.6 MG/DL
BUN SERPL-MCNC: 12 MG/DL
CALCIUM SERPL-MCNC: 8.9 MG/DL
CHLORIDE SERPL-SCNC: 98 MMOL/L
CO2 SERPL-SCNC: 25 MMOL/L
CREAT SERPL-MCNC: 0.48 MG/DL
DIFFERENTIAL METHOD: ABNORMAL
EOSINOPHIL # BLD AUTO: 0.1 K/UL
EOSINOPHIL NFR BLD: 1.9 %
ERYTHROCYTE [DISTWIDTH] IN BLOOD BY AUTOMATED COUNT: 13.4 %
EST. GFR  (AFRICAN AMERICAN): >60 ML/MIN/1.73 M^2
EST. GFR  (NON AFRICAN AMERICAN): >60 ML/MIN/1.73 M^2
GLUCOSE SERPL-MCNC: 91 MG/DL
HCT VFR BLD AUTO: 33.7 %
HGB BLD-MCNC: 11.2 G/DL
LYMPHOCYTES # BLD AUTO: 1.8 K/UL
LYMPHOCYTES NFR BLD: 37.6 %
MCH RBC QN AUTO: 34.9 PG
MCHC RBC AUTO-ENTMCNC: 33.2 %
MCV RBC AUTO: 105 FL
MONOCYTES # BLD AUTO: 0.5 K/UL
MONOCYTES NFR BLD: 10.3 %
NEUTROPHILS # BLD AUTO: 2.4 K/UL
NEUTROPHILS NFR BLD: 50 %
PLATELET # BLD AUTO: 116 K/UL
PMV BLD AUTO: 10.5 FL
POTASSIUM SERPL-SCNC: 4.3 MMOL/L
PROT SERPL-MCNC: 7 G/DL
RBC # BLD AUTO: 3.21 M/UL
SODIUM SERPL-SCNC: 135 MMOL/L
WBC # BLD AUTO: 4.84 K/UL

## 2017-06-06 PROCEDURE — 85025 COMPLETE CBC W/AUTO DIFF WBC: CPT | Mod: PO

## 2017-06-06 PROCEDURE — 80053 COMPREHEN METABOLIC PANEL: CPT | Mod: PO

## 2017-06-06 PROCEDURE — 36415 COLL VENOUS BLD VENIPUNCTURE: CPT | Mod: PO

## 2017-06-23 ENCOUNTER — LAB VISIT (OUTPATIENT)
Dept: LAB | Facility: HOSPITAL | Age: 82
End: 2017-06-23
Attending: INTERNAL MEDICINE
Payer: MEDICARE

## 2017-06-23 DIAGNOSIS — C83.30 DLBCL (DIFFUSE LARGE B CELL LYMPHOMA): Chronic | ICD-10-CM

## 2017-06-23 LAB
ALBUMIN SERPL BCP-MCNC: 4.3 G/DL
ALP SERPL-CCNC: 93 U/L
ALT SERPL W/O P-5'-P-CCNC: 21 U/L
ANION GAP SERPL CALC-SCNC: 12 MMOL/L
AST SERPL-CCNC: 20 U/L
BASOPHILS # BLD AUTO: 0.02 K/UL
BASOPHILS NFR BLD: 0.4 %
BILIRUB SERPL-MCNC: 0.6 MG/DL
BUN SERPL-MCNC: 10 MG/DL
CALCIUM SERPL-MCNC: 9.6 MG/DL
CHLORIDE SERPL-SCNC: 99 MMOL/L
CO2 SERPL-SCNC: 27 MMOL/L
CREAT SERPL-MCNC: 0.53 MG/DL
DIFFERENTIAL METHOD: ABNORMAL
EOSINOPHIL # BLD AUTO: 0.1 K/UL
EOSINOPHIL NFR BLD: 1.7 %
ERYTHROCYTE [DISTWIDTH] IN BLOOD BY AUTOMATED COUNT: 12.5 %
EST. GFR  (AFRICAN AMERICAN): >60 ML/MIN/1.73 M^2
EST. GFR  (NON AFRICAN AMERICAN): >60 ML/MIN/1.73 M^2
GLUCOSE SERPL-MCNC: 113 MG/DL
HCT VFR BLD AUTO: 34.1 %
HGB BLD-MCNC: 11.6 G/DL
LDH SERPL L TO P-CCNC: 223 U/L
LYMPHOCYTES # BLD AUTO: 1.3 K/UL
LYMPHOCYTES NFR BLD: 27.4 %
MCH RBC QN AUTO: 35.3 PG
MCHC RBC AUTO-ENTMCNC: 34 %
MCV RBC AUTO: 104 FL
MONOCYTES # BLD AUTO: 0.5 K/UL
MONOCYTES NFR BLD: 10 %
NEUTROPHILS # BLD AUTO: 2.9 K/UL
NEUTROPHILS NFR BLD: 60.5 %
PLATELET # BLD AUTO: 122 K/UL
PMV BLD AUTO: 10.2 FL
POTASSIUM SERPL-SCNC: 4 MMOL/L
PROT SERPL-MCNC: 7.3 G/DL
RBC # BLD AUTO: 3.29 M/UL
SODIUM SERPL-SCNC: 138 MMOL/L
WBC # BLD AUTO: 4.81 K/UL

## 2017-06-23 PROCEDURE — 83615 LACTATE (LD) (LDH) ENZYME: CPT

## 2017-06-23 PROCEDURE — 85025 COMPLETE CBC W/AUTO DIFF WBC: CPT | Mod: PO

## 2017-06-23 PROCEDURE — 36415 COLL VENOUS BLD VENIPUNCTURE: CPT | Mod: PO

## 2017-06-23 PROCEDURE — 80053 COMPREHEN METABOLIC PANEL: CPT | Mod: PO

## 2017-06-27 ENCOUNTER — PATIENT MESSAGE (OUTPATIENT)
Dept: CARDIOLOGY | Facility: HOSPITAL | Age: 82
End: 2017-06-27

## 2017-06-27 ENCOUNTER — OFFICE VISIT (OUTPATIENT)
Dept: HEMATOLOGY/ONCOLOGY | Facility: CLINIC | Age: 82
End: 2017-06-27
Payer: MEDICARE

## 2017-06-27 ENCOUNTER — OFFICE VISIT (OUTPATIENT)
Dept: CARDIOLOGY | Facility: CLINIC | Age: 82
End: 2017-06-27
Payer: MEDICARE

## 2017-06-27 VITALS — BODY MASS INDEX: 27.09 KG/M2 | HEIGHT: 59 IN | WEIGHT: 134.38 LBS

## 2017-06-27 VITALS
BODY MASS INDEX: 28.09 KG/M2 | WEIGHT: 133.81 LBS | HEIGHT: 58 IN | DIASTOLIC BLOOD PRESSURE: 80 MMHG | HEART RATE: 65 BPM | TEMPERATURE: 98 F | SYSTOLIC BLOOD PRESSURE: 150 MMHG

## 2017-06-27 DIAGNOSIS — E03.9 ACQUIRED HYPOTHYROIDISM: Chronic | ICD-10-CM

## 2017-06-27 DIAGNOSIS — G40.A09 NONINTRACTABLE ABSENCE EPILEPSY WITHOUT STATUS EPILEPTICUS: Chronic | ICD-10-CM

## 2017-06-27 DIAGNOSIS — C83.30 DLBCL (DIFFUSE LARGE B CELL LYMPHOMA): Chronic | ICD-10-CM

## 2017-06-27 DIAGNOSIS — I15.0 RENOVASCULAR HYPERTENSION: Chronic | ICD-10-CM

## 2017-06-27 DIAGNOSIS — D64.9 SYMPTOMATIC ANEMIA: ICD-10-CM

## 2017-06-27 DIAGNOSIS — Z98.890 S/P EXPLORATORY LAPAROTOMY: ICD-10-CM

## 2017-06-27 DIAGNOSIS — D69.6 THROMBOCYTOPENIA, UNSPECIFIED: ICD-10-CM

## 2017-06-27 DIAGNOSIS — C83.30 DLBCL (DIFFUSE LARGE B CELL LYMPHOMA): Primary | Chronic | ICD-10-CM

## 2017-06-27 DIAGNOSIS — S42.351D: ICD-10-CM

## 2017-06-27 DIAGNOSIS — I70.1 BILATERAL RENAL ARTERY STENOSIS: ICD-10-CM

## 2017-06-27 DIAGNOSIS — I70.1 BILATERAL RENAL ARTERY STENOSIS: Primary | ICD-10-CM

## 2017-06-27 PROCEDURE — 99213 OFFICE O/P EST LOW 20 MIN: CPT | Mod: S$PBB,,, | Performed by: INTERNAL MEDICINE

## 2017-06-27 PROCEDURE — 99999 PR PBB SHADOW E&M-EST. PATIENT-LVL III: CPT | Mod: PBBFAC,,, | Performed by: INTERNAL MEDICINE

## 2017-06-27 PROCEDURE — 1159F MED LIST DOCD IN RCRD: CPT | Mod: ,,, | Performed by: INTERNAL MEDICINE

## 2017-06-27 PROCEDURE — 1126F AMNT PAIN NOTED NONE PRSNT: CPT | Mod: ,,, | Performed by: INTERNAL MEDICINE

## 2017-06-27 PROCEDURE — 99999 PR PBB SHADOW E&M-EST. PATIENT-LVL II: CPT | Mod: PBBFAC,,, | Performed by: INTERNAL MEDICINE

## 2017-06-27 PROCEDURE — 99214 OFFICE O/P EST MOD 30 MIN: CPT | Mod: S$PBB,,, | Performed by: INTERNAL MEDICINE

## 2017-06-27 PROCEDURE — 99213 OFFICE O/P EST LOW 20 MIN: CPT | Mod: PBBFAC,27,PO | Performed by: INTERNAL MEDICINE

## 2017-06-27 RX ORDER — SODIUM CHLORIDE 9 MG/ML
3 INJECTION, SOLUTION INTRAVENOUS CONTINUOUS
Status: CANCELLED | OUTPATIENT
Start: 2017-06-27 | End: 2017-06-27

## 2017-06-27 RX ORDER — DIPHENHYDRAMINE HCL 25 MG
50 CAPSULE ORAL ONCE
Status: CANCELLED | OUTPATIENT
Start: 2017-06-27 | End: 2017-06-27

## 2017-06-27 RX ORDER — CLOPIDOGREL BISULFATE 75 MG/1
75 TABLET ORAL DAILY
Qty: 30 TABLET | Refills: 11 | Status: SHIPPED | OUTPATIENT
Start: 2017-06-27 | End: 2017-09-14

## 2017-06-27 NOTE — PROGRESS NOTES
Subjective:       Patient ID: Annette Garcia is a 81 y.o. female.    Chief Complaint: Lymphoma    HPI     She is here for follow-up of Diffuse large B-cell lymphoma.  She was having abdominal discomfort for over 4 months and a CT scan done on 8/15/16 showed dilated loops of small bowel. She underwent explorative laparotomy (8/23) with resection of a segment of her small bowel by  - 2 different areas of strictures was noticed intraoperatively. Pathology revealed diffuse large B-cell lymphoma - germinal center origin (CD10 negative, BCL6 positive, MUM1 negative). Ki-67 was elevated at 75%.    PET scan showed stage III disease. No lymphoma on bone marrow biopsy.    Got 6 cycles of R-CHOP chemotherapy. Completed in Jan 2017.    PET scan (12/20/16) - Complete resolution of the hypermetabolic abnormality.     She was admitted to the hospital on 1/26 with pancytopenia, dehydration and symptomatic anemia and received blood.      She fell at home in march and fractured her humerus. Sees . Getting PT.    Review of Systems   Constitutional: Negative for appetite change, fatigue, fever and unexpected weight change.   HENT: Negative for facial swelling and nosebleeds.    Eyes: Negative for photophobia and pain.   Respiratory: Negative for cough and shortness of breath.    Cardiovascular: Negative for chest pain and leg swelling.   Gastrointestinal: Negative for abdominal pain, blood in stool and nausea.   Genitourinary: Negative for dysuria and hematuria.   Skin: Negative for color change and rash.   Neurological: Negative for seizures, weakness and headaches.   Hematological: Negative for adenopathy. Does not bruise/bleed easily.       Objective:      Physical Exam   Constitutional: She is oriented to person, place, and time. No distress.   HENT:   Mouth/Throat: No oropharyngeal exudate.   Eyes: No scleral icterus.   Neck: Neck supple.   Cardiovascular: Normal rate.  Exam reveals no gallop.     Pulmonary/Chest: Effort normal. She has no wheezes. She has no rales.   Abdominal: Soft. There is no tenderness.   Musculoskeletal: She exhibits no edema.   Lymphadenopathy:     She has no cervical adenopathy.   Neurological: She is alert and oriented to person, place, and time.   Skin: She is not diaphoretic.       Assessment:     1. DLBCL (diffuse large B cell lymphoma)    2. Thrombocytopenia, unspecified    3. Closed comminuted fracture of right humerus, with routine healing, subsequent encounter    4. Bilateral renal artery stenosis          Plan:   In summary this is a 81-year-old female with stage III diffuse large B-cell lymphoma.  She status post 6 cycles of Rituxan CHOP chemotherapy for diffuse large B-cell lymphoma - completed in Jan 2017.    Labs reviewed.  Hemoglobin stable.  Thrombocytopenia stable.    She still getting physical therapy for the humerus fracture.    She is scheduled for angioplasty next month.    She is still recovering and getting therapy for the humerus fracture.  Last PET scan was in December.  We'll schedule follow-up PET scan in 3 months.    Will discuss port removal after the next PET scan.

## 2017-06-27 NOTE — PROGRESS NOTES
Subjective:    Patient ID:  Annette Garcia is a 81 y.o. female who presents for follow-up of Peripheral Arterial Disease      HPI  80 y/o female with hx of difficult to control HTN currently on 2 anti-hypertensives (previously on a diuretic but held due to recurrent dehydration), renal artery stenosis (LRA severe ostial disease, RRA with mild-mod disease), anemia, acquired hypothyroidism, diffuse large B-cell lymphoma on chemo (last PET showed remission). She presents for f/u HTN and renal artery stenosis.   Attempt made at intervention of LRA, however, groin access used and severe downward angulation prevented intervention and radial artery too small for equipment. Have delayed staging procedure due to fall and right shoulder fracture. Doing much better now and is receiving PT. H/H being monitored by Dr Arredondo and has improved. BP log reviewed and 's-190's (mostly in 160-190 range).   Cleared by H/O for procedures since last clinic visit. She looks better today and denies CP, SOB, orthopnea, PND, syncope. Has ZARAGOZA which is chronic.     Review of Systems   Constitution: Negative for weakness and malaise/fatigue.   HENT: Negative for congestion and headaches.    Eyes: Negative for blurred vision.   Cardiovascular: Positive for dyspnea on exertion. Negative for chest pain, claudication, cyanosis, irregular heartbeat, leg swelling, near-syncope, orthopnea, palpitations, paroxysmal nocturnal dyspnea and syncope.   Respiratory: Negative for shortness of breath.    Endocrine: Negative for polyuria.   Hematologic/Lymphatic: Negative for bleeding problem.   Skin: Negative for itching and rash.   Musculoskeletal: Positive for arthritis, joint pain and muscle weakness. Negative for joint swelling and muscle cramps.   Gastrointestinal: Negative for abdominal pain, hematemesis, hematochezia, melena, nausea and vomiting.   Genitourinary: Negative for dysuria and hematuria.   Neurological: Negative for dizziness, focal  weakness, light-headedness and loss of balance.   Psychiatric/Behavioral: Negative for depression. The patient is not nervous/anxious.         Objective:    Physical Exam   Constitutional: She is oriented to person, place, and time. She appears well-developed and well-nourished.   HENT:   Head: Normocephalic and atraumatic.   Neck: Neck supple. No JVD present.   Cardiovascular: Normal rate, regular rhythm and normal heart sounds.    Pulses:       Carotid pulses are 2+ on the right side, and 2+ on the left side.       Radial pulses are 2+ on the right side, and 2+ on the left side.        Femoral pulses are 2+ on the right side, and 2+ on the left side.       Dorsalis pedis pulses are 2+ on the right side, and 2+ on the left side.        Posterior tibial pulses are 2+ on the right side, and 2+ on the left side.   Pulmonary/Chest: Effort normal and breath sounds normal.   Abdominal: Soft. Bowel sounds are normal.   Musculoskeletal: She exhibits no edema.   Neurological: She is alert and oriented to person, place, and time.   Skin: Skin is warm and dry.   Psychiatric: She has a normal mood and affect. Her behavior is normal. Thought content normal.         Assessment:       1. Bilateral renal artery stenosis    2. Nonintractable absence epilepsy without status epilepticus    3. Renovascular hypertension    4. DLBCL (diffuse large B cell lymphoma)    5. Acquired hypothyroidism    6. S/P exploratory laparotomy    7. Symptomatic anemia      82 y/o female with hx and presentation as above. BP still elevated and will start doxazosin and monitor response - told to take at night at bedtime. Will plan for LRA intervention via brachial approach.        Plan:       -Start doxazosin 1 mg nightly  -Renal angiogram + intervention of LRA  -Left brachial artery access with 6 Fr sheath  -Will start plavix 75 mg daily (allergy to ASA)  -The procedure was discussed with the pt along with the risks/benefits and the pt voiced  understanding. All questions were answered and written consents obtained.

## 2017-06-28 ENCOUNTER — PATIENT MESSAGE (OUTPATIENT)
Dept: CARDIOLOGY | Facility: HOSPITAL | Age: 82
End: 2017-06-28

## 2017-06-28 ENCOUNTER — TELEPHONE (OUTPATIENT)
Dept: CARDIOLOGY | Facility: CLINIC | Age: 82
End: 2017-06-28

## 2017-06-28 NOTE — TELEPHONE ENCOUNTER
Patient would like to clarify instructions from yesterdays visit. Questions: When does patient start Dioxican? When does patient start plavix  ? How many days before the procedure?

## 2017-06-29 ENCOUNTER — OFFICE VISIT (OUTPATIENT)
Dept: ORTHOPEDICS | Facility: CLINIC | Age: 82
End: 2017-06-29
Payer: MEDICARE

## 2017-06-29 VITALS — WEIGHT: 133 LBS | HEIGHT: 58 IN | BODY MASS INDEX: 27.92 KG/M2

## 2017-06-29 DIAGNOSIS — S62.636D CLOSED DISPLACED FRACTURE OF DISTAL PHALANX OF RIGHT LITTLE FINGER WITH ROUTINE HEALING, SUBSEQUENT ENCOUNTER: Primary | ICD-10-CM

## 2017-06-29 DIAGNOSIS — S42.351D: ICD-10-CM

## 2017-06-29 PROCEDURE — 1159F MED LIST DOCD IN RCRD: CPT | Mod: ,,, | Performed by: ORTHOPAEDIC SURGERY

## 2017-06-29 PROCEDURE — 99213 OFFICE O/P EST LOW 20 MIN: CPT | Mod: S$PBB,,, | Performed by: ORTHOPAEDIC SURGERY

## 2017-06-29 PROCEDURE — 99213 OFFICE O/P EST LOW 20 MIN: CPT | Mod: PBBFAC,PO | Performed by: ORTHOPAEDIC SURGERY

## 2017-06-29 PROCEDURE — 99999 PR PBB SHADOW E&M-EST. PATIENT-LVL III: CPT | Mod: PBBFAC,,, | Performed by: ORTHOPAEDIC SURGERY

## 2017-06-29 PROCEDURE — 1126F AMNT PAIN NOTED NONE PRSNT: CPT | Mod: ,,, | Performed by: ORTHOPAEDIC SURGERY

## 2017-06-29 NOTE — PROGRESS NOTES
HISTORY OF PRESENT ILLNESS:  Ms. Garcia in followup of right proximal humerus   fracture and right small finger fracture.  She is doing well.  She has finished   up therapy.  She is doing exercises at home now.  She feels comfortable doing   most of her regular activities.  Really minimal pain.  She does take Tylenol for   that.    PHYSICAL EXAMINATION:  Right shoulder demonstrates no tenderness.  No swelling.    Range of motion is better, still lacks full elevation and internal and external   rotation.  The hand looks good.  No tenderness.  Almost full range of motion of   the fingers and  strength a little bit decreased.    No x-rays today.    PLAN:  Continue home exercises for both the hand and the shoulder.  I did   explain that some of the stiffness would be permanent, but she seems to be   making progress, so continue with exercises for that.  Otherwise, regular   activities.  Tylenol for pain.  Follow up as needed only.      RITU  dd: 06/29/2017 11:13:38 (CDT)  td: 07/13/2017 09:12:30 (CDT)  Doc ID   #[id number]  Job ID #[id number]    CC:

## 2017-06-30 RX ORDER — DOXAZOSIN 1 MG/1
1 TABLET ORAL NIGHTLY
Qty: 30 TABLET | Refills: 11 | Status: SHIPPED | OUTPATIENT
Start: 2017-06-30 | End: 2017-08-31 | Stop reason: SDUPTHER

## 2017-07-07 ENCOUNTER — TELEPHONE (OUTPATIENT)
Dept: CARDIOLOGY | Facility: CLINIC | Age: 82
End: 2017-07-07

## 2017-07-07 NOTE — TELEPHONE ENCOUNTER
Patient questions if she should continue taking Doxazosin due to her BP being extremely high 211/100?

## 2017-07-10 ENCOUNTER — TELEPHONE (OUTPATIENT)
Dept: NEUROLOGY | Facility: CLINIC | Age: 82
End: 2017-07-10

## 2017-07-10 NOTE — TELEPHONE ENCOUNTER
----- Message from Meche Trujillo MA sent at 7/7/2017  2:15 PM CDT -----  Contact: KURT APONTE [[id number]]  Dr. You - please advise  ----- Message -----  From: Keyona Muñozuel  Sent: 7/7/2017   9:34 AM  To: [facility] Staff    x_  1st Request  _  2nd Request  _  3rd Request        Who: KURT APONTE [[id number]]    Why: Patient states her blood pressure has been high and she thinks it could be from taking Rx CARDURA (from her cardiologist) and Rx TRILEPTAL (for her epilepsy)  together. She would like a call back to know if the combination of drugs may be the reason for her high BP. Please advise.     What Number to Call Back: [phone]     When to Expect a call back: (Before the end of the day)   -- if call after 3:00 call back will be tomorrow.

## 2017-07-10 NOTE — TELEPHONE ENCOUNTER
Advised patient that the Trileptal should not be increasing her blood pressure as she has been on medication and see her blood pressure problems are new.  She is advised to discuss this with her cardiologist to see if any changes in her blood pressure medications are appropriate.

## 2017-07-13 NOTE — PRE ADMISSION SCREENING
Called and spoke with pt, arrival time 6am. Verbalizes full understanding to all pre-op instructions and denies questions.

## 2017-07-19 ENCOUNTER — SURGERY (OUTPATIENT)
Age: 82
End: 2017-07-19

## 2017-07-19 ENCOUNTER — HOSPITAL ENCOUNTER (OUTPATIENT)
Facility: HOSPITAL | Age: 82
Discharge: HOME | End: 2017-07-20
Attending: INTERNAL MEDICINE | Admitting: INTERNAL MEDICINE
Payer: MEDICARE

## 2017-07-19 DIAGNOSIS — I15.0 RENOVASCULAR HYPERTENSION: Primary | Chronic | ICD-10-CM

## 2017-07-19 DIAGNOSIS — I70.1 BILATERAL RENAL ARTERY STENOSIS: ICD-10-CM

## 2017-07-19 LAB
ANION GAP SERPL CALC-SCNC: 10 MMOL/L
ANION GAP SERPL CALC-SCNC: 11 MMOL/L
BASOPHILS # BLD AUTO: 0.01 K/UL
BASOPHILS NFR BLD: 0.2 %
BUN SERPL-MCNC: 12 MG/DL
BUN SERPL-MCNC: 15 MG/DL
CALCIUM SERPL-MCNC: 9 MG/DL
CALCIUM SERPL-MCNC: 9.3 MG/DL
CHLORIDE SERPL-SCNC: 101 MMOL/L
CHLORIDE SERPL-SCNC: 98 MMOL/L
CO2 SERPL-SCNC: 23 MMOL/L
CO2 SERPL-SCNC: 26 MMOL/L
CREAT SERPL-MCNC: 0.7 MG/DL
CREAT SERPL-MCNC: 0.7 MG/DL
DIFFERENTIAL METHOD: ABNORMAL
EOSINOPHIL # BLD AUTO: 0.1 K/UL
EOSINOPHIL NFR BLD: 1.7 %
ERYTHROCYTE [DISTWIDTH] IN BLOOD BY AUTOMATED COUNT: 12.5 %
EST. GFR  (AFRICAN AMERICAN): >60 ML/MIN/1.73 M^2
EST. GFR  (AFRICAN AMERICAN): >60 ML/MIN/1.73 M^2
EST. GFR  (NON AFRICAN AMERICAN): >60 ML/MIN/1.73 M^2
EST. GFR  (NON AFRICAN AMERICAN): >60 ML/MIN/1.73 M^2
GLUCOSE SERPL-MCNC: 102 MG/DL
GLUCOSE SERPL-MCNC: 110 MG/DL
HCT VFR BLD AUTO: 30 %
HGB BLD-MCNC: 10.5 G/DL
INR PPP: 1.1
LYMPHOCYTES # BLD AUTO: 1.3 K/UL
LYMPHOCYTES NFR BLD: 27.6 %
MCH RBC QN AUTO: 35.2 PG
MCHC RBC AUTO-ENTMCNC: 35 %
MCV RBC AUTO: 101 FL
MONOCYTES # BLD AUTO: 0.6 K/UL
MONOCYTES NFR BLD: 13.9 %
NEUTROPHILS # BLD AUTO: 2.6 K/UL
NEUTROPHILS NFR BLD: 56.4 %
PERIPHERAL STENOSIS: ABNORMAL
PERIPHERAL STENT: YES
PLATELET # BLD AUTO: 104 K/UL
PMV BLD AUTO: 9.8 FL
POTASSIUM SERPL-SCNC: 3.8 MMOL/L
POTASSIUM SERPL-SCNC: 3.8 MMOL/L
PROTHROMBIN TIME: 11.5 SEC
RBC # BLD AUTO: 2.98 M/UL
SODIUM SERPL-SCNC: 134 MMOL/L
SODIUM SERPL-SCNC: 135 MMOL/L
WBC # BLD AUTO: 4.6 K/UL

## 2017-07-19 PROCEDURE — 85025 COMPLETE CBC W/AUTO DIFF WBC: CPT

## 2017-07-19 PROCEDURE — 80048 BASIC METABOLIC PNL TOTAL CA: CPT

## 2017-07-19 PROCEDURE — 25000003 PHARM REV CODE 250: Performed by: INTERNAL MEDICINE

## 2017-07-19 PROCEDURE — 99152 MOD SED SAME PHYS/QHP 5/>YRS: CPT | Mod: ,,, | Performed by: INTERNAL MEDICINE

## 2017-07-19 PROCEDURE — 37252 INTRVASC US NONCORONARY 1ST: CPT | Mod: ,,, | Performed by: INTERNAL MEDICINE

## 2017-07-19 PROCEDURE — 36245 INS CATH ABD/L-EXT ART 1ST: CPT | Mod: 51,,, | Performed by: INTERNAL MEDICINE

## 2017-07-19 PROCEDURE — 25000003 PHARM REV CODE 250

## 2017-07-19 PROCEDURE — C1769 GUIDE WIRE: HCPCS

## 2017-07-19 PROCEDURE — 85610 PROTHROMBIN TIME: CPT

## 2017-07-19 PROCEDURE — 94761 N-INVAS EAR/PLS OXIMETRY MLT: CPT

## 2017-07-19 PROCEDURE — 63600175 PHARM REV CODE 636 W HCPCS

## 2017-07-19 PROCEDURE — 99152 MOD SED SAME PHYS/QHP 5/>YRS: CPT

## 2017-07-19 PROCEDURE — 37236 OPEN/PERQ PLACE STENT 1ST: CPT | Mod: ,,, | Performed by: INTERNAL MEDICINE

## 2017-07-19 PROCEDURE — 36415 COLL VENOUS BLD VENIPUNCTURE: CPT

## 2017-07-19 PROCEDURE — 25500020 PHARM REV CODE 255

## 2017-07-19 RX ORDER — OXCARBAZEPINE 300 MG/1
300 TABLET, FILM COATED ORAL NIGHTLY
Status: DISCONTINUED | OUTPATIENT
Start: 2017-07-19 | End: 2017-07-20 | Stop reason: HOSPADM

## 2017-07-19 RX ORDER — PHENOBARBITAL 32.4 MG/1
64.8 TABLET ORAL NIGHTLY
Status: DISCONTINUED | OUTPATIENT
Start: 2017-07-19 | End: 2017-07-20 | Stop reason: HOSPADM

## 2017-07-19 RX ORDER — POLYETHYLENE GLYCOL 3350 17 G/17G
17 POWDER, FOR SOLUTION ORAL 2 TIMES DAILY PRN
Status: DISCONTINUED | OUTPATIENT
Start: 2017-07-19 | End: 2017-07-20 | Stop reason: HOSPADM

## 2017-07-19 RX ORDER — CLOPIDOGREL BISULFATE 75 MG/1
75 TABLET ORAL DAILY
Status: DISCONTINUED | OUTPATIENT
Start: 2017-07-20 | End: 2017-07-20 | Stop reason: HOSPADM

## 2017-07-19 RX ORDER — ONDANSETRON 2 MG/ML
4 INJECTION INTRAMUSCULAR; INTRAVENOUS EVERY 12 HOURS PRN
Status: DISCONTINUED | OUTPATIENT
Start: 2017-07-19 | End: 2017-07-20 | Stop reason: HOSPADM

## 2017-07-19 RX ORDER — LISINOPRIL 20 MG/1
40 TABLET ORAL DAILY
Status: DISCONTINUED | OUTPATIENT
Start: 2017-07-20 | End: 2017-07-20 | Stop reason: HOSPADM

## 2017-07-19 RX ORDER — DOXAZOSIN 1 MG/1
1 TABLET ORAL NIGHTLY
Status: DISCONTINUED | OUTPATIENT
Start: 2017-07-19 | End: 2017-07-20 | Stop reason: HOSPADM

## 2017-07-19 RX ORDER — CARVEDILOL 25 MG/1
25 TABLET ORAL 2 TIMES DAILY WITH MEALS
Status: DISCONTINUED | OUTPATIENT
Start: 2017-07-19 | End: 2017-07-20 | Stop reason: HOSPADM

## 2017-07-19 RX ORDER — SODIUM CHLORIDE 9 MG/ML
3 INJECTION, SOLUTION INTRAVENOUS CONTINUOUS
Status: ACTIVE | OUTPATIENT
Start: 2017-07-19 | End: 2017-07-19

## 2017-07-19 RX ADMIN — CARVEDILOL 25 MG: 25 TABLET, FILM COATED ORAL at 04:07

## 2017-07-19 RX ADMIN — OXCARBAZEPINE 300 MG: 150 TABLET, FILM COATED ORAL at 09:07

## 2017-07-19 RX ADMIN — PHENOBARBITAL 64.8 MG: 32.4 TABLET ORAL at 09:07

## 2017-07-19 RX ADMIN — DOXAZOSIN MESYLATE 1 MG: 1 TABLET ORAL at 09:07

## 2017-07-19 NOTE — NURSING
Order to pull right brachial sheath when ACT less than 180. ACT checked at 1240 and was 175. Sheath pulled at 1245 by RT Gabrielle. Manual pressure held for 25 minutes. Gauze and tegaderm applied. Site soft, no blleding, tenderness, oozing, or hematoma present. Pt instructed to keep arm straight for the next two hours. Will continue to monitor.

## 2017-07-19 NOTE — PLAN OF CARE
Patient arrived from cath lab. Patient walked from the stretcher to the restroom with assistance.  Site WNL, no swelling, bleeding, oozing, or hematoma present. Pt walked to bed with no problems. Vitals stable. Pt with no complaints.  Vitals stable running normal sinus on tele.  I will continue to monitor.

## 2017-07-19 NOTE — PLAN OF CARE
Site WNL, no swelling, bleeding, oozing, or hematoma present. Pt resting comfortably denies any pain just a little tenderness on insertion site.  Running normal sinus on tele.  I will continue to monitor

## 2017-07-19 NOTE — NURSING TRANSFER
Report called to ZEINAB Ibanez. Pt transported on tele monitor. Tele box applied upon arrival to room. Right brachial site reviewed with RN at BS. Site WNL, no swelling, bleeding, oozing, or hematoma present. Pt walked to bed with no problems. Vitals stable. Pt with no complaints. All belongings sent with pt to room. Family at BS.

## 2017-07-19 NOTE — PROGRESS NOTES
Post Procedure Note: LRA PTA with stent    The pt was brought to the cath lab and under sterile technique, right brachial access was obtained without difficulty under US guidance. Images were obtained in multiple views and PTA with stent to ostial LRA performed successfully. Please see full report for details. The pt tolerated the procedure well without complications. Manual pressure held with successful hemostasis.     Vitals:    07/19/17 1115 07/19/17 1130 07/19/17 1145 07/19/17 1200   BP: (!) 166/78 (!) 169/71 (!) 169/71    BP Location: Left arm Left arm Left arm    Patient Position: Lying Lying Lying    BP Method: Automatic Automatic Automatic    Pulse: 61 65 64 63   Resp: 18 19 18 17   Temp:       TempSrc:       SpO2: 97% 99% 99% 99%   Weight:       Height:             Gen: NAD  Ext: 2+ brachial pulse, no evidence of hematoma    Plan:  -Post cath care per protocol  -ASA for life, plavix at least a month

## 2017-07-20 VITALS
OXYGEN SATURATION: 97 % | HEIGHT: 57 IN | HEART RATE: 60 BPM | TEMPERATURE: 97 F | WEIGHT: 133 LBS | DIASTOLIC BLOOD PRESSURE: 77 MMHG | SYSTOLIC BLOOD PRESSURE: 185 MMHG | RESPIRATION RATE: 16 BRPM | BODY MASS INDEX: 28.69 KG/M2

## 2017-07-20 PROCEDURE — 25000003 PHARM REV CODE 250: Performed by: INTERNAL MEDICINE

## 2017-07-20 PROCEDURE — 94761 N-INVAS EAR/PLS OXIMETRY MLT: CPT

## 2017-07-20 RX ADMIN — CLOPIDOGREL BISULFATE 75 MG: 75 TABLET ORAL at 08:07

## 2017-07-20 RX ADMIN — CARVEDILOL 25 MG: 25 TABLET, FILM COATED ORAL at 08:07

## 2017-07-20 RX ADMIN — LEVOTHYROXINE SODIUM 100 MCG: 25 TABLET ORAL at 05:07

## 2017-07-20 RX ADMIN — LISINOPRIL 40 MG: 20 TABLET ORAL at 08:07

## 2017-07-20 NOTE — PLAN OF CARE
Patient d/c home d/c instructions given to the patient and the family member, verbalized understanding. Education given, refer to clinical reference for education. IV removed tip intact. Telemetry d/c. Waiting for transport

## 2017-07-20 NOTE — PLAN OF CARE
Problem: Patient Care Overview  Goal: Plan of Care Review  Outcome: Ongoing (interventions implemented as appropriate)  Pt on RA with sats of 94. Will continue to monitor.

## 2017-07-20 NOTE — PLAN OF CARE
Problem: Infection, Risk/Actual (Adult)  Intervention: Manage Suspected/Actual Infection    No c/o pain.  Pt ambulated to bathroom with assist.  Dressing to right ac dry and intact, no drainage noted.  Remains NSR/SB on telemetry with HR high 50's to 60's.  Bed alarm on for safety.  Will continue to monitor.

## 2017-07-25 LAB
POC ACTIVATED CLOTTING TIME K: 175 SEC (ref 74–137)
POC ACTIVATED CLOTTING TIME K: 197 SEC (ref 74–137)
POC ACTIVATED CLOTTING TIME K: 208 SEC (ref 74–137)
POC ACTIVATED CLOTTING TIME K: 213 SEC (ref 74–137)
POC ACTIVATED CLOTTING TIME K: 230 SEC (ref 74–137)
POC ACTIVATED CLOTTING TIME K: 263 SEC (ref 74–137)
SAMPLE: ABNORMAL

## 2017-07-27 ENCOUNTER — TELEPHONE (OUTPATIENT)
Dept: CARDIOLOGY | Facility: CLINIC | Age: 82
End: 2017-07-27

## 2017-07-27 NOTE — TELEPHONE ENCOUNTER
----- Message from Christina Booth sent at 7/27/2017  1:02 PM CDT -----  Contact: [phone]  Patient would like to speak with you regarding elevated blood pressure

## 2017-08-01 ENCOUNTER — TELEPHONE (OUTPATIENT)
Dept: CARDIOLOGY | Facility: CLINIC | Age: 82
End: 2017-08-01

## 2017-08-10 ENCOUNTER — OFFICE VISIT (OUTPATIENT)
Dept: CARDIOLOGY | Facility: CLINIC | Age: 82
End: 2017-08-10
Payer: MEDICARE

## 2017-08-10 VITALS
BODY MASS INDEX: 29.8 KG/M2 | HEART RATE: 76 BPM | SYSTOLIC BLOOD PRESSURE: 190 MMHG | WEIGHT: 138.13 LBS | HEIGHT: 57 IN | OXYGEN SATURATION: 97 % | DIASTOLIC BLOOD PRESSURE: 82 MMHG

## 2017-08-10 DIAGNOSIS — C83.30 DLBCL (DIFFUSE LARGE B CELL LYMPHOMA): Chronic | ICD-10-CM

## 2017-08-10 DIAGNOSIS — I15.0 RENOVASCULAR HYPERTENSION: Primary | Chronic | ICD-10-CM

## 2017-08-10 DIAGNOSIS — E03.9 ACQUIRED HYPOTHYROIDISM: Chronic | ICD-10-CM

## 2017-08-10 DIAGNOSIS — D64.81 ANEMIA DUE TO ANTINEOPLASTIC CHEMOTHERAPY: Chronic | ICD-10-CM

## 2017-08-10 DIAGNOSIS — D69.6 THROMBOCYTOPENIA, UNSPECIFIED: ICD-10-CM

## 2017-08-10 DIAGNOSIS — T45.1X5A ANEMIA DUE TO ANTINEOPLASTIC CHEMOTHERAPY: Chronic | ICD-10-CM

## 2017-08-10 DIAGNOSIS — I70.1 BILATERAL RENAL ARTERY STENOSIS: ICD-10-CM

## 2017-08-10 PROCEDURE — 3077F SYST BP >= 140 MM HG: CPT | Mod: ,,, | Performed by: INTERNAL MEDICINE

## 2017-08-10 PROCEDURE — 99999 PR PBB SHADOW E&M-EST. PATIENT-LVL III: CPT | Mod: PBBFAC,,, | Performed by: INTERNAL MEDICINE

## 2017-08-10 PROCEDURE — 1125F AMNT PAIN NOTED PAIN PRSNT: CPT | Mod: ,,, | Performed by: INTERNAL MEDICINE

## 2017-08-10 PROCEDURE — 1159F MED LIST DOCD IN RCRD: CPT | Mod: ,,, | Performed by: INTERNAL MEDICINE

## 2017-08-10 PROCEDURE — 99213 OFFICE O/P EST LOW 20 MIN: CPT | Mod: PBBFAC,PO | Performed by: INTERNAL MEDICINE

## 2017-08-10 PROCEDURE — 99213 OFFICE O/P EST LOW 20 MIN: CPT | Mod: S$PBB,,, | Performed by: INTERNAL MEDICINE

## 2017-08-10 PROCEDURE — 3079F DIAST BP 80-89 MM HG: CPT | Mod: ,,, | Performed by: INTERNAL MEDICINE

## 2017-08-10 NOTE — PROGRESS NOTES
Subjective:    Patient ID:  Annette Garcia is a 81 y.o. female who presents for follow-up of Hypertension      HPI  82 y/o female with hx of difficult to control HTN (previously on a diuretic but dicsontinued due to recurrent dehydration), renal artery stenosis (LRA severe ostial disease s/p YOLANDA, RRA with mild-mod disease), anemia, acquired hypothyroidism, diffuse large B-cell lymphoma on chemo (last PET showed remission). She presents for f/u HTN and renal artery stenosis. Had fall and right shoulder fracture. Doing much better now and is receiving PT. BP log available and shows AM pressures 120-130's with PM pressures 160-180's. Compliant with meds. She looks better today and denies CP, SOB, orthopnea, PND, syncope. Has ZARAGOZA which is chronic.     Review of Systems   Constitution: Positive for weakness. Negative for malaise/fatigue.   HENT: Negative for congestion and headaches.    Eyes: Negative for blurred vision.   Cardiovascular: Positive for dyspnea on exertion. Negative for chest pain, claudication, cyanosis, irregular heartbeat, leg swelling, near-syncope, orthopnea, palpitations, paroxysmal nocturnal dyspnea and syncope.   Respiratory: Negative for shortness of breath.    Endocrine: Negative for polyuria.   Hematologic/Lymphatic: Negative for bleeding problem.   Skin: Negative for itching and rash.   Musculoskeletal: Negative for joint swelling, muscle cramps and muscle weakness.   Gastrointestinal: Negative for abdominal pain, hematemesis, hematochezia, melena, nausea and vomiting.   Genitourinary: Negative for dysuria and hematuria.   Neurological: Negative for dizziness, focal weakness, light-headedness and loss of balance.   Psychiatric/Behavioral: Negative for depression. The patient is not nervous/anxious.         Objective:    Physical Exam   Constitutional: She is oriented to person, place, and time. She appears well-developed and well-nourished.   HENT:   Head: Normocephalic and atraumatic.    Neck: Neck supple. No JVD present.   Cardiovascular: Normal rate, regular rhythm and normal heart sounds.    Pulses:       Carotid pulses are 2+ on the right side, and 2+ on the left side.       Radial pulses are 2+ on the right side, and 2+ on the left side.        Femoral pulses are 2+ on the right side, and 2+ on the left side.       Dorsalis pedis pulses are 2+ on the right side, and 2+ on the left side.        Posterior tibial pulses are 2+ on the right side, and 2+ on the left side.   Pulmonary/Chest: Effort normal and breath sounds normal.   Abdominal: Soft. Bowel sounds are normal.   Musculoskeletal: She exhibits no edema.   Neurological: She is alert and oriented to person, place, and time.   Skin: Skin is warm and dry.   Psychiatric: She has a normal mood and affect. Her behavior is normal. Thought content normal.         Assessment:       1. Renovascular hypertension    2. Bilateral renal artery stenosis    3. Thrombocytopenia, unspecified    4. Anemia due to antineoplastic chemotherapy    5. DLBCL (diffuse large B cell lymphoma)    6. Acquired hypothyroidism      82 y/o female with hx and presentation as above. Appears to respond well to doxazosin and late AM and PM pressures still high. Will change doxazozin to AM and lisinopril to PM. Continue BP log.        Plan:       -Change doxazosin to AM  -Change lisinopril to PM  -BP log x 2 weeks and will call clinic with results  -f/u in 1 month

## 2017-08-24 ENCOUNTER — PATIENT MESSAGE (OUTPATIENT)
Dept: CARDIOLOGY | Facility: CLINIC | Age: 82
End: 2017-08-24

## 2017-08-29 ENCOUNTER — TELEPHONE (OUTPATIENT)
Dept: CARDIOLOGY | Facility: CLINIC | Age: 82
End: 2017-08-29

## 2017-08-29 NOTE — TELEPHONE ENCOUNTER
----- Message from Timmy Simmons MD sent at 8/29/2017  3:38 PM CDT -----  Please tell Ms starks to increase her doxazosin to 2 mg at night.  Thanks  ALEX

## 2017-08-31 DIAGNOSIS — I15.0 RENOVASCULAR HYPERTENSION: Primary | ICD-10-CM

## 2017-09-01 ENCOUNTER — PATIENT MESSAGE (OUTPATIENT)
Dept: CARDIOLOGY | Facility: CLINIC | Age: 82
End: 2017-09-01

## 2017-09-01 RX ORDER — DOXAZOSIN 1 MG/1
2 TABLET ORAL DAILY
Qty: 60 TABLET | Refills: 11 | Status: SHIPPED | OUTPATIENT
Start: 2017-09-01 | End: 2017-09-14 | Stop reason: SDUPTHER

## 2017-09-01 NOTE — TELEPHONE ENCOUNTER
----- Message from Amaya Silverio sent at 9/1/2017  9:57 AM CDT -----  Contact: Daughter/ [phone]  Patient's daughter would like to speak with you about getting a refill for doxazosin (CARDURA) 1 MG tablet because the doctor asked the patient to increase the dosage to 2 tablets a day. Please advise.

## 2017-09-06 ENCOUNTER — TELEPHONE (OUTPATIENT)
Dept: HEMATOLOGY/ONCOLOGY | Facility: CLINIC | Age: 82
End: 2017-09-06

## 2017-09-07 NOTE — TELEPHONE ENCOUNTER
----- Message from Maude Robledo sent at 9/7/2017 11:17 AM CDT -----  Contact: self/[phone]  She is returning the nurse call.

## 2017-09-07 NOTE — TELEPHONE ENCOUNTER
Spoke with patient and scheduled follow up and labs for Dr. Arredondo. Patient verbalized understanding

## 2017-09-14 ENCOUNTER — OFFICE VISIT (OUTPATIENT)
Dept: CARDIOLOGY | Facility: CLINIC | Age: 82
End: 2017-09-14
Payer: MEDICARE

## 2017-09-14 VITALS
SYSTOLIC BLOOD PRESSURE: 130 MMHG | BODY MASS INDEX: 28.88 KG/M2 | WEIGHT: 133.88 LBS | DIASTOLIC BLOOD PRESSURE: 70 MMHG | HEIGHT: 57 IN

## 2017-09-14 DIAGNOSIS — T45.1X5A ANEMIA DUE TO ANTINEOPLASTIC CHEMOTHERAPY: Chronic | ICD-10-CM

## 2017-09-14 DIAGNOSIS — D64.81 ANEMIA DUE TO ANTINEOPLASTIC CHEMOTHERAPY: Chronic | ICD-10-CM

## 2017-09-14 DIAGNOSIS — I15.0 RENOVASCULAR HYPERTENSION: Primary | Chronic | ICD-10-CM

## 2017-09-14 DIAGNOSIS — C83.30 DLBCL (DIFFUSE LARGE B CELL LYMPHOMA): Chronic | ICD-10-CM

## 2017-09-14 DIAGNOSIS — D69.6 THROMBOCYTOPENIA, UNSPECIFIED: ICD-10-CM

## 2017-09-14 DIAGNOSIS — I10 ESSENTIAL HYPERTENSION: ICD-10-CM

## 2017-09-14 DIAGNOSIS — I70.1 BILATERAL RENAL ARTERY STENOSIS: ICD-10-CM

## 2017-09-14 DIAGNOSIS — E03.9 ACQUIRED HYPOTHYROIDISM: Chronic | ICD-10-CM

## 2017-09-14 PROCEDURE — 3075F SYST BP GE 130 - 139MM HG: CPT | Mod: ,,, | Performed by: INTERNAL MEDICINE

## 2017-09-14 PROCEDURE — 99999 PR PBB SHADOW E&M-EST. PATIENT-LVL II: CPT | Mod: PBBFAC,,, | Performed by: INTERNAL MEDICINE

## 2017-09-14 PROCEDURE — 99214 OFFICE O/P EST MOD 30 MIN: CPT | Mod: S$PBB,,, | Performed by: INTERNAL MEDICINE

## 2017-09-14 PROCEDURE — 1159F MED LIST DOCD IN RCRD: CPT | Mod: ,,, | Performed by: INTERNAL MEDICINE

## 2017-09-14 PROCEDURE — 99212 OFFICE O/P EST SF 10 MIN: CPT | Mod: PBBFAC,PO | Performed by: INTERNAL MEDICINE

## 2017-09-14 PROCEDURE — 3078F DIAST BP <80 MM HG: CPT | Mod: ,,, | Performed by: INTERNAL MEDICINE

## 2017-09-14 PROCEDURE — 1126F AMNT PAIN NOTED NONE PRSNT: CPT | Mod: ,,, | Performed by: INTERNAL MEDICINE

## 2017-09-14 RX ORDER — CARVEDILOL 25 MG/1
25 TABLET ORAL 2 TIMES DAILY WITH MEALS
Qty: 180 TABLET | Refills: 3 | Status: SHIPPED | OUTPATIENT
Start: 2017-09-14 | End: 2018-09-06 | Stop reason: SDUPTHER

## 2017-09-14 RX ORDER — DOXAZOSIN 1 MG/1
2 TABLET ORAL DAILY
Qty: 180 TABLET | Refills: 3 | Status: SHIPPED | OUTPATIENT
Start: 2017-09-14 | End: 2017-12-14 | Stop reason: SDUPTHER

## 2017-09-14 RX ORDER — CLOPIDOGREL BISULFATE 75 MG/1
75 TABLET ORAL DAILY
COMMUNITY
End: 2017-09-14

## 2017-09-14 NOTE — PROGRESS NOTES
Subjective:    Patient ID:  Annette Garcia is a 82 y.o. female who presents for follow-up of Hypertension      HPI  81 y/o female with hx of difficult to control HTN (previously on a diuretic but dicsontinued due to recurrent dehydration), renal artery stenosis (LRA severe ostial disease s/p YOLANDA, RRA with mild-mod disease), anemia, acquired hypothyroidism, diffuse large B-cell lymphoma on chemo (last PET showed remission). She presents for f/u HTN and renal artery stenosis.  Last clinic visit had changed doxazosin to AM and lisinopril to PM. BP log with readings that are persistently elevated and slightly worse that prior to switch. Compliant with meds. She feels good and denies CP, SOB, orthopnea, PND, syncope. Has ZARAGOZA which is chronic. She admits to dietary indiscretion with high sodium diet including V8, canned foods, TV dinners, soft drinks, gumbo, chinese food.     Review of Systems   Constitution: Positive for malaise/fatigue. Negative for weakness.   HENT: Negative for congestion.    Eyes: Negative for blurred vision.   Cardiovascular: Negative for chest pain, claudication, cyanosis, dyspnea on exertion, irregular heartbeat, leg swelling, near-syncope, orthopnea, palpitations, paroxysmal nocturnal dyspnea and syncope.   Respiratory: Negative for shortness of breath.    Endocrine: Negative for polyuria.   Hematologic/Lymphatic: Negative for bleeding problem.   Skin: Negative for itching and rash.   Musculoskeletal: Positive for arthritis and muscle weakness. Negative for joint swelling and muscle cramps.   Gastrointestinal: Negative for abdominal pain, hematemesis, hematochezia, melena, nausea and vomiting.   Genitourinary: Negative for dysuria and hematuria.   Neurological: Negative for dizziness, focal weakness, headaches, light-headedness and loss of balance.   Psychiatric/Behavioral: Negative for depression. The patient is not nervous/anxious.         Objective:    Physical Exam   Constitutional: She  is oriented to person, place, and time. She appears well-developed and well-nourished.   HENT:   Head: Normocephalic and atraumatic.   Neck: Neck supple. No JVD present.   Cardiovascular: Normal rate, regular rhythm and normal heart sounds.    Pulses:       Carotid pulses are 2+ on the right side, and 2+ on the left side.       Radial pulses are 2+ on the right side, and 2+ on the left side.        Femoral pulses are 2+ on the right side, and 2+ on the left side.       Dorsalis pedis pulses are 2+ on the right side, and 2+ on the left side.        Posterior tibial pulses are 2+ on the right side, and 2+ on the left side.   Pulmonary/Chest: Effort normal and breath sounds normal.   Abdominal: Soft. Bowel sounds are normal.   Musculoskeletal: She exhibits no edema.   Neurological: She is alert and oriented to person, place, and time.   Skin: Skin is warm and dry.   Psychiatric: She has a normal mood and affect. Her behavior is normal. Thought content normal.         Assessment:       1. Renovascular hypertension    2. Bilateral renal artery stenosis    3. Thrombocytopenia, unspecified    4. Anemia due to antineoplastic chemotherapy    5. DLBCL (diffuse large B cell lymphoma)    6. Acquired hypothyroidism        83 y/o female with hx and presentation as above. Feeling well, however, BP persistently elevated. Feel dietary indiscretion playing a role. Discussed the importance of low salt diet. Will change regimen back to doxazosin in PM and lisinopril in AM.      Plan:       -Doxazosin 2 mg qPM and Lisinopril in  AM  -Low salt diet  -Stop plavix  -f/u in 6 months

## 2017-09-14 NOTE — PROGRESS NOTES
Subjective:    Patient ID:  Annette Garcia is a 82 y.o. female who presents for follow-up of Hypertension      HPI    Review of Systems   Constitution: Negative for weakness and malaise/fatigue.   HENT: Negative for congestion.    Eyes: Negative for blurred vision.   Cardiovascular: Negative for chest pain, claudication, cyanosis, dyspnea on exertion, irregular heartbeat, leg swelling, near-syncope, orthopnea, palpitations, paroxysmal nocturnal dyspnea and syncope.   Respiratory: Negative for shortness of breath.    Endocrine: Negative for polyuria.   Hematologic/Lymphatic: Negative for bleeding problem.   Skin: Negative for itching and rash.   Musculoskeletal: Negative for joint swelling, muscle cramps and muscle weakness.   Gastrointestinal: Negative for abdominal pain, hematemesis, hematochezia, melena, nausea and vomiting.   Genitourinary: Negative for dysuria and hematuria.   Neurological: Negative for dizziness, focal weakness, headaches, light-headedness and loss of balance.   Psychiatric/Behavioral: Negative for depression. The patient is not nervous/anxious.         Objective:    Physical Exam   Constitutional: She is oriented to person, place, and time. She appears well-developed and well-nourished.   HENT:   Head: Normocephalic and atraumatic.   Neck: Neck supple. No JVD present.   Cardiovascular: Normal rate, regular rhythm and normal heart sounds.    Pulses:       Carotid pulses are 2+ on the right side, and 2+ on the left side.       Radial pulses are 2+ on the right side, and 2+ on the left side.        Femoral pulses are 2+ on the right side, and 2+ on the left side.       Dorsalis pedis pulses are 2+ on the right side, and 2+ on the left side.        Posterior tibial pulses are 2+ on the right side, and 2+ on the left side.   Pulmonary/Chest: Effort normal and breath sounds normal.   Abdominal: Soft. Bowel sounds are normal.   Musculoskeletal: She exhibits no edema.   Neurological: She is alert  and oriented to person, place, and time.   Skin: Skin is warm and dry.   Psychiatric: She has a normal mood and affect. Her behavior is normal. Thought content normal.         Assessment:       1. Renovascular hypertension    2. Bilateral renal artery stenosis    3. Thrombocytopenia, unspecified    4. Anemia due to antineoplastic chemotherapy    5. DLBCL (diffuse large B cell lymphoma)    6. Acquired hypothyroidism         Plan:

## 2017-09-15 ENCOUNTER — HOSPITAL ENCOUNTER (OUTPATIENT)
Dept: RADIOLOGY | Facility: HOSPITAL | Age: 82
Discharge: HOME | End: 2017-09-15
Attending: INTERNAL MEDICINE
Payer: MEDICARE

## 2017-09-15 DIAGNOSIS — C83.30 DLBCL (DIFFUSE LARGE B CELL LYMPHOMA): Chronic | ICD-10-CM

## 2017-09-15 PROCEDURE — 78815 PET IMAGE W/CT SKULL-THIGH: CPT | Mod: 26,PS,GC, | Performed by: RADIOLOGY

## 2017-09-15 PROCEDURE — A9552 F18 FDG: HCPCS

## 2017-09-27 ENCOUNTER — OFFICE VISIT (OUTPATIENT)
Dept: HEMATOLOGY/ONCOLOGY | Facility: CLINIC | Age: 82
End: 2017-09-27
Payer: MEDICARE

## 2017-09-27 ENCOUNTER — INFUSION (OUTPATIENT)
Dept: INFUSION THERAPY | Facility: HOSPITAL | Age: 82
End: 2017-09-27
Attending: INTERNAL MEDICINE
Payer: MEDICARE

## 2017-09-27 VITALS
DIASTOLIC BLOOD PRESSURE: 78 MMHG | HEIGHT: 58 IN | BODY MASS INDEX: 28.05 KG/M2 | HEART RATE: 70 BPM | OXYGEN SATURATION: 97 % | SYSTOLIC BLOOD PRESSURE: 130 MMHG | WEIGHT: 133.63 LBS

## 2017-09-27 DIAGNOSIS — S42.424D CLOSED NONDISPLACED COMMINUTED SUPRACONDYLAR FRACTURE OF RIGHT HUMERUS WITHOUT INTERCONDYLAR FRACTURE WITH ROUTINE HEALING, SUBSEQUENT ENCOUNTER: ICD-10-CM

## 2017-09-27 DIAGNOSIS — C83.30 DLBCL (DIFFUSE LARGE B CELL LYMPHOMA): Primary | Chronic | ICD-10-CM

## 2017-09-27 DIAGNOSIS — D69.6 THROMBOCYTOPENIA, UNSPECIFIED: ICD-10-CM

## 2017-09-27 DIAGNOSIS — T45.1X5A ANEMIA DUE TO ANTINEOPLASTIC CHEMOTHERAPY: Primary | ICD-10-CM

## 2017-09-27 DIAGNOSIS — D64.81 ANEMIA DUE TO ANTINEOPLASTIC CHEMOTHERAPY: Primary | ICD-10-CM

## 2017-09-27 PROCEDURE — 25000003 PHARM REV CODE 250: Performed by: INTERNAL MEDICINE

## 2017-09-27 PROCEDURE — 3078F DIAST BP <80 MM HG: CPT | Mod: ,,, | Performed by: INTERNAL MEDICINE

## 2017-09-27 PROCEDURE — 99999 PR PBB SHADOW E&M-EST. PATIENT-LVL III: CPT | Mod: PBBFAC,,, | Performed by: INTERNAL MEDICINE

## 2017-09-27 PROCEDURE — 99213 OFFICE O/P EST LOW 20 MIN: CPT | Mod: PBBFAC,PO,25 | Performed by: INTERNAL MEDICINE

## 2017-09-27 PROCEDURE — A4216 STERILE WATER/SALINE, 10 ML: HCPCS | Performed by: INTERNAL MEDICINE

## 2017-09-27 PROCEDURE — 3075F SYST BP GE 130 - 139MM HG: CPT | Mod: ,,, | Performed by: INTERNAL MEDICINE

## 2017-09-27 PROCEDURE — 96523 IRRIG DRUG DELIVERY DEVICE: CPT

## 2017-09-27 PROCEDURE — 1159F MED LIST DOCD IN RCRD: CPT | Mod: ,,, | Performed by: INTERNAL MEDICINE

## 2017-09-27 PROCEDURE — 99213 OFFICE O/P EST LOW 20 MIN: CPT | Mod: S$PBB,,, | Performed by: INTERNAL MEDICINE

## 2017-09-27 PROCEDURE — 63600175 PHARM REV CODE 636 W HCPCS: Performed by: INTERNAL MEDICINE

## 2017-09-27 PROCEDURE — 1126F AMNT PAIN NOTED NONE PRSNT: CPT | Mod: ,,, | Performed by: INTERNAL MEDICINE

## 2017-09-27 RX ORDER — HEPARIN 100 UNIT/ML
500 SYRINGE INTRAVENOUS
Status: CANCELLED | OUTPATIENT
Start: 2017-09-27

## 2017-09-27 RX ORDER — SODIUM CHLORIDE 0.9 % (FLUSH) 0.9 %
10 SYRINGE (ML) INJECTION
Status: COMPLETED | OUTPATIENT
Start: 2017-09-27 | End: 2017-09-27

## 2017-09-27 RX ORDER — HEPARIN 100 UNIT/ML
500 SYRINGE INTRAVENOUS
Status: COMPLETED | OUTPATIENT
Start: 2017-09-27 | End: 2017-09-27

## 2017-09-27 RX ORDER — SODIUM CHLORIDE 0.9 % (FLUSH) 0.9 %
10 SYRINGE (ML) INJECTION
Status: CANCELLED | OUTPATIENT
Start: 2017-09-27

## 2017-09-27 RX ADMIN — SODIUM CHLORIDE, PRESERVATIVE FREE 10 ML: 5 INJECTION INTRAVENOUS at 03:09

## 2017-09-27 RX ADMIN — HEPARIN SODIUM (PORCINE) LOCK FLUSH IV SOLN 100 UNIT/ML 500 UNITS: 100 SOLUTION at 03:09

## 2017-09-27 NOTE — NURSING
Tolerated port flush well. PAC flushed with NS and Heparin per protocol and deaccessed prior to discharge.

## 2017-09-27 NOTE — PROGRESS NOTES
Subjective:       Patient ID: Annette Garcia is a 82 y.o. female.    Chief Complaint: No chief complaint on file.    HPI     She is here for follow-up of Diffuse large B-cell lymphoma.  She was having abdominal discomfort for over 4 months and a CT scan done on 8/15/16 showed dilated loops of small bowel. She underwent explorative laparotomy (8/23) with resection of a segment of her small bowel by  - 2 different areas of strictures was noticed intraoperatively. Pathology revealed diffuse large B-cell lymphoma - germinal center origin (CD10 negative, BCL6 positive, MUM1 negative). Ki-67 was elevated at 75%.    PET scan showed stage III disease. No lymphoma on bone marrow biopsy.    Got 6 cycles of R-CHOP chemotherapy. Completed in Jan 2017.    PET scan (12/20/16) - Complete resolution of the hypermetabolic abnormality.     She was admitted to the hospital on 1/26 with pancytopenia, dehydration and symptomatic anemia and received blood.      She fell at home in march and fractured her humerus. Sees . Getting PT.    Review of Systems   Constitutional: Negative for appetite change, fatigue, fever and unexpected weight change.   HENT: Negative for facial swelling and nosebleeds.    Eyes: Negative for photophobia and pain.   Respiratory: Negative for cough and shortness of breath.    Cardiovascular: Negative for chest pain and leg swelling.   Gastrointestinal: Negative for abdominal pain, blood in stool and nausea.   Genitourinary: Negative for dysuria and hematuria.   Skin: Negative for color change and rash.   Neurological: Negative for seizures, weakness and headaches.   Hematological: Negative for adenopathy. Does not bruise/bleed easily.       Objective:      Physical Exam   Constitutional: She is oriented to person, place, and time. No distress.   HENT:   Mouth/Throat: No oropharyngeal exudate.   Eyes: No scleral icterus.   Neck: Neck supple.   Cardiovascular: Normal rate.  Exam reveals no  gallop.    Pulmonary/Chest: Effort normal. She has no wheezes. She has no rales.   Abdominal: Soft. There is no tenderness.   Musculoskeletal: She exhibits no edema.   Lymphadenopathy:     She has no cervical adenopathy.   Neurological: She is alert and oriented to person, place, and time.   Skin: She is not diaphoretic.       Assessment:     1. DLBCL (diffuse large B cell lymphoma)    2. Thrombocytopenia, unspecified    3. Closed nondisplaced comminuted supracondylar fracture of right humerus without intercondylar fracture with routine healing, subsequent encounter          Plan:   In summary this is a 81-year-old female with stage III diffuse large B-cell lymphoma.    She status post 6 cycles of Rituxan CHOP chemotherapy for diffuse large B-cell lymphoma - completed in Jan 2017.    Labs reviewed.  Hemoglobin stable.  Thrombocytopenia stable.    She still getting physical therapy for the humerus fracture.    Reviewed recent PET scan.  Will monitor abnormalities.  Repeat CT scans in January.    Port flush today.    Follow-up in January.  Plan port removal in January.

## 2017-10-02 ENCOUNTER — OFFICE VISIT (OUTPATIENT)
Dept: FAMILY MEDICINE | Facility: CLINIC | Age: 82
End: 2017-10-02
Payer: MEDICARE

## 2017-10-02 VITALS
WEIGHT: 132.38 LBS | HEART RATE: 78 BPM | HEIGHT: 57 IN | BODY MASS INDEX: 28.56 KG/M2 | TEMPERATURE: 98 F | DIASTOLIC BLOOD PRESSURE: 82 MMHG | SYSTOLIC BLOOD PRESSURE: 110 MMHG | OXYGEN SATURATION: 98 %

## 2017-10-02 DIAGNOSIS — B02.7 DISSEMINATED HERPES ZOSTER: Primary | ICD-10-CM

## 2017-10-02 PROCEDURE — 3079F DIAST BP 80-89 MM HG: CPT | Mod: S$GLB,,, | Performed by: NURSE PRACTITIONER

## 2017-10-02 PROCEDURE — 99213 OFFICE O/P EST LOW 20 MIN: CPT | Mod: S$GLB,,, | Performed by: NURSE PRACTITIONER

## 2017-10-02 PROCEDURE — 1159F MED LIST DOCD IN RCRD: CPT | Mod: S$GLB,,, | Performed by: NURSE PRACTITIONER

## 2017-10-02 PROCEDURE — 3074F SYST BP LT 130 MM HG: CPT | Mod: S$GLB,,, | Performed by: NURSE PRACTITIONER

## 2017-10-02 RX ORDER — CLOPIDOGREL BISULFATE 75 MG/1
TABLET ORAL
COMMUNITY
Start: 2017-09-19 | End: 2018-01-25

## 2017-10-02 RX ORDER — VALACYCLOVIR HYDROCHLORIDE 1 G/1
1000 TABLET, FILM COATED ORAL 2 TIMES DAILY
Qty: 14 TABLET | Refills: 0 | Status: SHIPPED | OUTPATIENT
Start: 2017-10-02 | End: 2018-01-25

## 2017-10-02 NOTE — PROGRESS NOTES
Subjective:       Patient ID: Annette Garcia is a 82 y.o. female.    Chief Complaint: Herpes Zoster    Rash   This is a new problem. The current episode started in the past 7 days. The problem is unchanged. Location: left side of abd. The rash is characterized by blistering and pain. She was exposed to nothing. Pertinent negatives include no anorexia, congestion, cough, diarrhea, eye pain, facial edema, fatigue, fever, joint pain, nail changes, rhinorrhea, shortness of breath, sore throat or vomiting. Past treatments include nothing. There is no history of allergies, asthma, eczema or varicella.     Review of Systems   Constitutional: Negative for chills, diaphoresis, fatigue and fever.   HENT: Negative for congestion, rhinorrhea and sore throat.    Eyes: Negative for pain and visual disturbance.   Respiratory: Negative for cough and shortness of breath.    Gastrointestinal: Negative for anorexia, diarrhea and vomiting.   Musculoskeletal: Negative for joint pain.   Skin: Positive for rash. Negative for color change, nail changes, pallor and wound.   Neurological: Negative for dizziness and light-headedness.       Objective:      Physical Exam   Constitutional: She appears well-developed and well-nourished. No distress.   HENT:   Right Ear: External ear normal.   Left Ear: External ear normal.   Cardiovascular: Normal rate, regular rhythm and normal heart sounds.    Pulmonary/Chest: Effort normal and breath sounds normal. No respiratory distress. She has no wheezes.   Skin: Skin is warm and dry. Rash noted. Rash is vesicular. She is not diaphoretic. No erythema. No pallor.        Psychiatric: She has a normal mood and affect. Her behavior is normal. Judgment and thought content normal.   Vitals reviewed.      Assessment:       1. Disseminated herpes zoster        Plan:       Disseminated herpes zoster  -     valacyclovir (VALTREX) 1000 MG tablet; Take 1 tablet (1,000 mg total) by mouth 2 (two) times daily.   Dispense: 14 tablet; Refill: 0

## 2017-10-03 ENCOUNTER — TELEPHONE (OUTPATIENT)
Dept: NEUROLOGY | Facility: CLINIC | Age: 82
End: 2017-10-03

## 2017-10-03 NOTE — TELEPHONE ENCOUNTER
Will inform patient to reschedule appointment until she is feeling better and has been treated for shingles.

## 2017-10-03 NOTE — TELEPHONE ENCOUNTER
"----- Message from Holley Salvador sent at 10/3/2017  1:57 PM CDT -----  Contact: Patient herself  X 1st Request  _  2nd Request  _  3rd Request    Who: Annette Garcia (mrn# [id number])    Why: Patient called and said, "she has a shingles flare up and would like to know if she should keep her appointment scheduled for Friday 10/5/41042."  Please give a call back at your your earliest convenience.    THANKS!    What Number to Call Back:   [phone]    When to Expect a call back: (Before the end of the day)   -- if the call is after 12:00, the call back will be tomorrow.                          "

## 2017-10-14 DIAGNOSIS — G40.209 PARTIAL SYMPTOMATIC EPILEPSY WITH COMPLEX PARTIAL SEIZURES, NOT INTRACTABLE, WITHOUT STATUS EPILEPTICUS: ICD-10-CM

## 2017-10-16 RX ORDER — PHENOBARBITAL 64.8 MG/1
64.8 TABLET ORAL NIGHTLY
Qty: 90 TABLET | Refills: 1 | Status: SHIPPED | OUTPATIENT
Start: 2017-10-16 | End: 2018-04-12 | Stop reason: SDUPTHER

## 2017-10-18 ENCOUNTER — TELEPHONE (OUTPATIENT)
Dept: FAMILY MEDICINE | Facility: CLINIC | Age: 82
End: 2017-10-18

## 2017-10-18 NOTE — TELEPHONE ENCOUNTER
----- Message from Elida Youssef sent at 10/18/2017  9:02 AM CDT -----  Contact: Pt [phone]  Patient would like to know if it's a ok to go to the nursing home today to see her . Patient states she was in last week for shingles and just finish all medications. Please advise

## 2017-10-20 ENCOUNTER — OFFICE VISIT (OUTPATIENT)
Dept: NEUROLOGY | Facility: CLINIC | Age: 82
End: 2017-10-20
Payer: MEDICARE

## 2017-10-20 VITALS
DIASTOLIC BLOOD PRESSURE: 78 MMHG | WEIGHT: 133.81 LBS | BODY MASS INDEX: 28.87 KG/M2 | SYSTOLIC BLOOD PRESSURE: 189 MMHG | HEIGHT: 57 IN | HEART RATE: 69 BPM

## 2017-10-20 DIAGNOSIS — D64.81 ANEMIA DUE TO ANTINEOPLASTIC CHEMOTHERAPY: Chronic | ICD-10-CM

## 2017-10-20 DIAGNOSIS — G40.A09 NONINTRACTABLE ABSENCE EPILEPSY WITHOUT STATUS EPILEPTICUS: Primary | Chronic | ICD-10-CM

## 2017-10-20 DIAGNOSIS — T45.1X5A ANEMIA DUE TO ANTINEOPLASTIC CHEMOTHERAPY: Chronic | ICD-10-CM

## 2017-10-20 PROCEDURE — 99214 OFFICE O/P EST MOD 30 MIN: CPT | Mod: S$PBB,,, | Performed by: PSYCHIATRY & NEUROLOGY

## 2017-10-20 PROCEDURE — 99999 PR PBB SHADOW E&M-EST. PATIENT-LVL III: CPT | Mod: PBBFAC,,, | Performed by: PSYCHIATRY & NEUROLOGY

## 2017-10-20 PROCEDURE — 99213 OFFICE O/P EST LOW 20 MIN: CPT | Mod: PBBFAC,PO | Performed by: PSYCHIATRY & NEUROLOGY

## 2017-10-20 NOTE — PATIENT INSTRUCTIONS
Discussed with patient and daughter.  Seizure precautions including compliance stressed.  Continue phenobarbital and Trileptal 300 mg at bedtime daily.

## 2017-10-20 NOTE — PROGRESS NOTES
Subjective:       Patient ID: Annette Garcia is a 82 y.o. female.    Chief Complaint:  Seizures      History of Present Illness  HPI   This is an 82-year-old female who seen by me for follow-up of a seizure disorder.  The patient has had a history of seizures since her childhood years.  She was initially evaluated at [facility] by Dr. Jonathon Dejesus and subsequently was being followed by Dr. Wilder in [facility] patient has been on phenobarbital and Tegretol for many years.  Her seizures have been well controlled on medication.  She reports that she had her last seizure about 20 years ago at which time she had been admitted to the hospital for some back problems and her seizure medicines were not continued following that admission and she had a cluster of brief seizures.  As reported that she has occasional blank stares and memory lapses though she has had occasional generalized seizures when she was young.  Medical records from Dr. Wilder were available for review.  She is presently on phenobarbital and Trileptal and has been doing well.  She continues follow-up for the lymphoma by her oncologist.  She was accompanied by her daughter today.       Review of Systems  Review of Systems   Constitutional: Negative.    HENT: Negative.  Negative for hearing loss.    Eyes: Negative.  Negative for visual disturbance.   Respiratory: Negative.  Negative for shortness of breath.    Cardiovascular: Negative.  Negative for chest pain and palpitations.   Gastrointestinal: Negative.    Endocrine: Negative.    Genitourinary: Negative.    Musculoskeletal: Positive for back pain. Negative for gait problem and neck pain.   Skin: Negative.    Allergic/Immunologic: Negative.    Neurological: Positive for seizures. Negative for tremors, syncope, speech difficulty, weakness, numbness and headaches.   Hematological: Negative.    Psychiatric/Behavioral: Negative.  Negative for confusion and decreased concentration.       Objective:       Neurologic Exam     Mental Status   Oriented to person, place, and time.   Registration: recalls 3 of 3 objects. Follows 3 step commands.   Attention: normal. Concentration: normal.   Speech: speech is normal   Level of consciousness: alert  Knowledge: good.   Able to name object. Able to read. Able to repeat. Able to write. Normal comprehension.     Cranial Nerves   Cranial nerves II through XII intact.     Motor Exam   Muscle bulk: normal  Overall muscle tone: normal    Strength   Strength 5/5 throughout.     Sensory Exam   Light touch normal.   Proprioception normal.     Gait, Coordination, and Reflexes     Gait  Gait: normal (Walks a little stiffly because of back problems.  Uses a cane for stability)    Coordination   Romberg: negative  Finger to nose coordination: normal    Tremor   Resting tremor: absent  Intention tremor: absent  Action tremor: absent    Reflexes   Right brachioradialis: 1+  Left brachioradialis: 1+  Right biceps: 1+  Left biceps: 1+  Right triceps: 1+  Left triceps: 1+  Right patellar: 1+  Left patellar: 1+  Right achilles: 0  Left achilles: 0  Right plantar: normal  Left plantar: normal      Physical Exam   Constitutional: She is oriented to person, place, and time. She appears well-developed and well-nourished.   HENT:   Head: Normocephalic and atraumatic.   Neck: Normal range of motion. Neck supple. Carotid bruit is not present.   Neurological: She is oriented to person, place, and time. She has normal strength. She has a normal Finger-Nose-Finger Test and a normal Romberg Test. Gait normal.   Reflex Scores:       Tricep reflexes are 1+ on the right side and 1+ on the left side.       Bicep reflexes are 1+ on the right side and 1+ on the left side.       Brachioradialis reflexes are 1+ on the right side and 1+ on the left side.       Patellar reflexes are 1+ on the right side and 1+ on the left side.       Achilles reflexes are 0 on the right side and 0 on the left side.  Psychiatric:  Her speech is normal.   Vitals reviewed.        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 Anemia due to antineoplastic chemotherapy            Plan:         Discussed with patient and daughter.  Seizure precautions including compliance stressed.  Continue phenobarbital and Trileptal 300 mg at bedtime daily.  Follow-up in one year if stable.

## 2017-12-13 ENCOUNTER — TELEPHONE (OUTPATIENT)
Dept: HEMATOLOGY/ONCOLOGY | Facility: CLINIC | Age: 82
End: 2017-12-13

## 2017-12-14 ENCOUNTER — OFFICE VISIT (OUTPATIENT)
Dept: CARDIOLOGY | Facility: CLINIC | Age: 82
End: 2017-12-14
Payer: MEDICARE

## 2017-12-14 VITALS
OXYGEN SATURATION: 98 % | SYSTOLIC BLOOD PRESSURE: 178 MMHG | HEART RATE: 87 BPM | DIASTOLIC BLOOD PRESSURE: 79 MMHG | WEIGHT: 138.88 LBS | BODY MASS INDEX: 29.96 KG/M2 | HEIGHT: 57 IN

## 2017-12-14 DIAGNOSIS — I70.1 BILATERAL RENAL ARTERY STENOSIS: ICD-10-CM

## 2017-12-14 DIAGNOSIS — T45.1X5A ANEMIA DUE TO ANTINEOPLASTIC CHEMOTHERAPY: Chronic | ICD-10-CM

## 2017-12-14 DIAGNOSIS — C83.30 DIFFUSE LARGE B-CELL LYMPHOMA, UNSPECIFIED BODY REGION: Chronic | ICD-10-CM

## 2017-12-14 DIAGNOSIS — D64.81 ANEMIA DUE TO ANTINEOPLASTIC CHEMOTHERAPY: Chronic | ICD-10-CM

## 2017-12-14 DIAGNOSIS — I15.0 RENOVASCULAR HYPERTENSION: Primary | Chronic | ICD-10-CM

## 2017-12-14 PROCEDURE — 99214 OFFICE O/P EST MOD 30 MIN: CPT | Mod: S$PBB,,, | Performed by: INTERNAL MEDICINE

## 2017-12-14 PROCEDURE — 99213 OFFICE O/P EST LOW 20 MIN: CPT | Mod: PBBFAC,PO | Performed by: INTERNAL MEDICINE

## 2017-12-14 PROCEDURE — 99999 PR PBB SHADOW E&M-EST. PATIENT-LVL III: CPT | Mod: PBBFAC,,, | Performed by: INTERNAL MEDICINE

## 2017-12-14 RX ORDER — DOXAZOSIN 1 MG/1
3 TABLET ORAL DAILY
Qty: 270 TABLET | Refills: 3 | Status: SHIPPED | OUTPATIENT
Start: 2017-12-14 | End: 2018-09-06

## 2017-12-14 NOTE — PROGRESS NOTES
Subjective:    Patient ID:  Annette Garcia is a 82 y.o. female who presents for follow-up of Hypertension      HPI  83 y/o female with hx of difficult to control HTN (previously on a diuretic but dicsontinued due to recurrent dehydration), renal artery stenosis (LRA severe ostial disease s/p YOLANDA, RRA with mild-mod disease), anemia, acquired hypothyroidism, diffuse large B-cell lymphoma on chemo (last PET showed remission). She presents for f/u HTN and renal artery stenosis.  Last clinic visit changed doxazosin back to PM and lisinopril to AM. BP log with readings improved with -160's with outlier of 205 (she claims she ate corn bisque that day).   Compliant with meds. She feels good and denies CP, SOB, orthopnea, PND, syncope. Has ZARAGOZA which is chronic and some fatigue. She admits to dietary indiscretion with high sodium diet including Nicole's Pie, Corn bisque, and onion rings (previously was V8, canned foods, TV dinners, soft drinks, gumbo, chinese food).    Review of Systems   Constitution: Positive for weakness and malaise/fatigue.   HENT: Negative for congestion.    Eyes: Negative for blurred vision.   Cardiovascular: Positive for dyspnea on exertion. Negative for chest pain, claudication, cyanosis, irregular heartbeat, leg swelling, near-syncope, orthopnea, palpitations, paroxysmal nocturnal dyspnea and syncope.   Respiratory: Negative for shortness of breath.    Endocrine: Negative for polyuria.   Hematologic/Lymphatic: Negative for bleeding problem.   Skin: Negative for itching and rash.   Musculoskeletal: Negative for joint swelling, muscle cramps and muscle weakness.   Gastrointestinal: Negative for abdominal pain, hematemesis, hematochezia, melena, nausea and vomiting.   Genitourinary: Negative for dysuria and hematuria.   Neurological: Negative for dizziness, focal weakness, headaches, light-headedness and loss of balance.   Psychiatric/Behavioral: Negative for depression. The patient is not  nervous/anxious.         Objective:    Physical Exam   Constitutional: She is oriented to person, place, and time. She appears well-developed and well-nourished.   HENT:   Head: Normocephalic and atraumatic.   Neck: Neck supple. No JVD present.   Cardiovascular: Normal rate, regular rhythm and normal heart sounds.    Pulses:       Carotid pulses are 2+ on the right side, and 2+ on the left side.       Radial pulses are 2+ on the right side, and 2+ on the left side.        Femoral pulses are 2+ on the right side, and 2+ on the left side.       Dorsalis pedis pulses are 2+ on the right side, and 2+ on the left side.        Posterior tibial pulses are 2+ on the right side, and 2+ on the left side.   Pulmonary/Chest: Effort normal and breath sounds normal.   Abdominal: Soft. Bowel sounds are normal.   Musculoskeletal: She exhibits no edema.   Neurological: She is alert and oriented to person, place, and time.   Skin: Skin is warm and dry.   Psychiatric: She has a normal mood and affect. Her behavior is normal. Thought content normal.         Assessment:       1. Renovascular hypertension    2. Bilateral renal artery stenosis    3. Anemia due to antineoplastic chemotherapy    4. Diffuse large B-cell lymphoma, unspecified body region      81 y/o female with hx and presentation as above. Symptomatically doing well. BP somewhat improved. Will increase doxazosin. Discussed the importance of low salt diet.        Plan:       -Increase doxazosin to 3 mg daily  -f/u in 6 months

## 2017-12-19 ENCOUNTER — TELEPHONE (OUTPATIENT)
Dept: HEMATOLOGY/ONCOLOGY | Facility: CLINIC | Age: 82
End: 2017-12-19

## 2017-12-22 ENCOUNTER — TELEPHONE (OUTPATIENT)
Dept: HEMATOLOGY/ONCOLOGY | Facility: CLINIC | Age: 82
End: 2017-12-22

## 2018-01-18 ENCOUNTER — HOSPITAL ENCOUNTER (OUTPATIENT)
Dept: RADIOLOGY | Facility: HOSPITAL | Age: 83
Discharge: HOME | End: 2018-01-18
Attending: INTERNAL MEDICINE
Payer: MEDICARE

## 2018-01-18 DIAGNOSIS — C83.30 DLBCL (DIFFUSE LARGE B CELL LYMPHOMA): Chronic | ICD-10-CM

## 2018-01-23 ENCOUNTER — HOSPITAL ENCOUNTER (OUTPATIENT)
Dept: RADIOLOGY | Facility: HOSPITAL | Age: 83
Discharge: HOME | End: 2018-01-23
Attending: INTERNAL MEDICINE
Payer: MEDICARE

## 2018-01-23 PROCEDURE — 25500020 PHARM REV CODE 255: Mod: PO | Performed by: INTERNAL MEDICINE

## 2018-01-23 PROCEDURE — 74177 CT ABD & PELVIS W/CONTRAST: CPT | Mod: TC,PO

## 2018-01-23 PROCEDURE — 71260 CT THORAX DX C+: CPT | Mod: TC,PO

## 2018-01-23 RX ADMIN — IOHEXOL 30 ML: 300 INJECTION, SOLUTION INTRAVENOUS at 08:01

## 2018-01-23 RX ADMIN — IOHEXOL 90 ML: 350 INJECTION, SOLUTION INTRAVENOUS at 10:01

## 2018-01-25 ENCOUNTER — OFFICE VISIT (OUTPATIENT)
Dept: HEMATOLOGY/ONCOLOGY | Facility: CLINIC | Age: 83
End: 2018-01-25
Payer: MEDICARE

## 2018-01-25 ENCOUNTER — PATIENT MESSAGE (OUTPATIENT)
Dept: HEMATOLOGY/ONCOLOGY | Facility: CLINIC | Age: 83
End: 2018-01-25

## 2018-01-25 VITALS
BODY MASS INDEX: 29.49 KG/M2 | OXYGEN SATURATION: 96 % | SYSTOLIC BLOOD PRESSURE: 193 MMHG | DIASTOLIC BLOOD PRESSURE: 84 MMHG | WEIGHT: 136.69 LBS | HEIGHT: 57 IN | HEART RATE: 69 BPM

## 2018-01-25 DIAGNOSIS — D69.6 THROMBOCYTOPENIA, UNSPECIFIED: ICD-10-CM

## 2018-01-25 DIAGNOSIS — C83.30 DIFFUSE LARGE B-CELL LYMPHOMA, UNSPECIFIED BODY REGION: Primary | Chronic | ICD-10-CM

## 2018-01-25 DIAGNOSIS — E87.1 CHRONIC HYPONATREMIA: ICD-10-CM

## 2018-01-25 PROCEDURE — 99999 PR PBB SHADOW E&M-EST. PATIENT-LVL III: CPT | Mod: PBBFAC,,, | Performed by: INTERNAL MEDICINE

## 2018-01-25 PROCEDURE — 99213 OFFICE O/P EST LOW 20 MIN: CPT | Mod: PBBFAC,PO | Performed by: INTERNAL MEDICINE

## 2018-01-25 PROCEDURE — 99213 OFFICE O/P EST LOW 20 MIN: CPT | Mod: S$PBB,,, | Performed by: INTERNAL MEDICINE

## 2018-01-25 NOTE — PROGRESS NOTES
Subjective:       Patient ID: Annette Garcia is a 82 y.o. female.    Chief Complaint: No chief complaint on file.    HPI     She is here for follow-up of Diffuse large B-cell lymphoma.  She was having abdominal discomfort for over 4 months and a CT scan done on 8/15/16 showed dilated loops of small bowel. She underwent explorative laparotomy (8/23) with resection of a segment of her small bowel by  - 2 different areas of strictures was noticed intraoperatively. Pathology revealed diffuse large B-cell lymphoma - germinal center origin (CD10 negative, BCL6 positive, MUM1 negative). Ki-67 was elevated at 75%.    PET scan showed stage III disease. No lymphoma on bone marrow biopsy.    Got 6 cycles of R-CHOP chemotherapy. Completed in Jan 2017.    PET scan (12/20/16) - Complete resolution of the hypermetabolic abnormality.     She was admitted to the hospital on 1/26 with pancytopenia, dehydration and symptomatic anemia and received blood.      She fell at home in march 2017 and fractured her humerus. Sees . Getting PT.    Her  passed 2 weeks ago.    Review of Systems   Constitutional: Negative for appetite change, fatigue, fever and unexpected weight change.   HENT: Negative for facial swelling and nosebleeds.    Eyes: Negative for photophobia and pain.   Respiratory: Negative for cough and shortness of breath.    Cardiovascular: Negative for chest pain and leg swelling.   Gastrointestinal: Negative for abdominal pain, blood in stool and nausea.   Genitourinary: Negative for dysuria and hematuria.   Skin: Negative for color change and rash.   Neurological: Negative for seizures, weakness and headaches.   Hematological: Negative for adenopathy. Does not bruise/bleed easily.       Objective:      Physical Exam   Constitutional: She is oriented to person, place, and time. No distress.   HENT:   Mouth/Throat: No oropharyngeal exudate.   Eyes: No scleral icterus.   Neck: Neck supple.    Cardiovascular: Normal rate.  Exam reveals no gallop.    Pulmonary/Chest: Effort normal. She has no wheezes. She has no rales.   Abdominal: Soft. There is no tenderness.   Musculoskeletal: She exhibits no edema.   Lymphadenopathy:     She has no cervical adenopathy.   Neurological: She is alert and oriented to person, place, and time.   Skin: She is not diaphoretic.       Assessment:     1. Diffuse large B-cell lymphoma, unspecified body region    2. Thrombocytopenia, unspecified    3. Chronic hyponatremia          Plan:   In summary this is a 81-year-old female with stage III diffuse large B-cell lymphoma.    She status post 6 cycles of Rituxan CHOP chemotherapy for diffuse large B-cell lymphoma - completed in Jan 2017.    Labs reviewed.  Hemoglobin stable.  Thrombocytopenia stable.    Recent labs unremarkable.    Will continue to monitor.  Repeat labs in 3 months.    Check restaging scans in August 2018.    Follow-up in 3 months.  Port flush after follow-up.    Plan port removal in January 2019

## 2018-03-17 DIAGNOSIS — I10 ESSENTIAL HYPERTENSION: Chronic | ICD-10-CM

## 2018-03-18 RX ORDER — LISINOPRIL 40 MG/1
TABLET ORAL
Qty: 90 TABLET | Refills: 3 | Status: SHIPPED | OUTPATIENT
Start: 2018-03-18 | End: 2018-09-06 | Stop reason: SDUPTHER

## 2018-03-26 RX ORDER — LEVOTHYROXINE SODIUM 100 UG/1
TABLET ORAL
Qty: 90 TABLET | Refills: 3 | Status: SHIPPED | OUTPATIENT
Start: 2018-03-26 | End: 2019-03-24 | Stop reason: SDUPTHER

## 2018-03-27 NOTE — TELEPHONE ENCOUNTER
I reviewed the pt pharmacy benefits - levothyroxine is preferred.    [facility] called [facility] medication is covered   90 day supply is $38.43    I notified the pt

## 2018-03-27 NOTE — TELEPHONE ENCOUNTER
----- Message from Manjula Monzon sent at 3/27/2018 11:52 AM CDT -----  Contact: The pt called /  [phone]  Her insurance will no longer cover the levothyroxine.  Will need something sent to the pharmacy in place of it.  [facility] is the pharmacy she prefer.  Any questions, call her at [phone]

## 2018-04-12 DIAGNOSIS — G40.209 PARTIAL SYMPTOMATIC EPILEPSY WITH COMPLEX PARTIAL SEIZURES, NOT INTRACTABLE, WITHOUT STATUS EPILEPTICUS: ICD-10-CM

## 2018-04-12 RX ORDER — PHENOBARBITAL 64.8 MG/1
64.8 TABLET ORAL NIGHTLY
Qty: 90 TABLET | Refills: 1 | Status: SHIPPED | OUTPATIENT
Start: 2018-04-12 | End: 2018-09-28 | Stop reason: SDUPTHER

## 2018-04-16 DIAGNOSIS — G40.209 PARTIAL SYMPTOMATIC EPILEPSY WITH COMPLEX PARTIAL SEIZURES, NOT INTRACTABLE, WITHOUT STATUS EPILEPTICUS: ICD-10-CM

## 2018-04-16 RX ORDER — PHENOBARBITAL 64.8 MG/1
64.8 TABLET ORAL NIGHTLY
Qty: 90 TABLET | Refills: 1 | Status: SHIPPED | OUTPATIENT
Start: 2018-04-16 | End: 2018-07-26 | Stop reason: SDUPTHER

## 2018-04-18 ENCOUNTER — TELEPHONE (OUTPATIENT)
Dept: NEUROLOGY | Facility: CLINIC | Age: 83
End: 2018-04-18

## 2018-04-18 DIAGNOSIS — G40.209 PARTIAL SYMPTOMATIC EPILEPSY WITH COMPLEX PARTIAL SEIZURES, NOT INTRACTABLE, WITHOUT STATUS EPILEPTICUS: ICD-10-CM

## 2018-04-18 RX ORDER — PHENOBARBITAL 64.8 MG/1
64.8 TABLET ORAL NIGHTLY
Qty: 90 TABLET | OUTPATIENT
Start: 2018-04-18

## 2018-04-18 NOTE — TELEPHONE ENCOUNTER
----- Message from Ambreen Camacho sent at 4/18/2018 12:29 PM CDT -----  Contact: hilario            Name of Who is Calling: hilario      What is the request in detail: pharmacy needs PA for PHENobarbital (LUMINAL) 64.8 MG tablet. delphine russell      Can the clinic reply by [facility]: no      What Number to Call Back if not in [facility]: [phone]

## 2018-04-18 NOTE — TELEPHONE ENCOUNTER
No PA required just needed refill approval which was done on 4-14, and 4-16 by . Patient has been notified, and says pharmacy has received approval.

## 2018-04-29 DIAGNOSIS — G40.A09 NONINTRACTABLE ABSENCE EPILEPSY WITHOUT STATUS EPILEPTICUS: Chronic | ICD-10-CM

## 2018-04-29 RX ORDER — OXCARBAZEPINE 300 MG/1
300 TABLET, FILM COATED ORAL NIGHTLY
Qty: 90 TABLET | Refills: 3 | Status: SHIPPED | OUTPATIENT
Start: 2018-04-29 | End: 2019-05-03 | Stop reason: SDUPTHER

## 2018-04-30 ENCOUNTER — LAB VISIT (OUTPATIENT)
Dept: LAB | Facility: HOSPITAL | Age: 83
End: 2018-04-30
Attending: INTERNAL MEDICINE
Payer: MEDICARE

## 2018-04-30 DIAGNOSIS — C83.30 DIFFUSE LARGE B-CELL LYMPHOMA, UNSPECIFIED BODY REGION: Chronic | ICD-10-CM

## 2018-04-30 LAB
ALBUMIN SERPL BCP-MCNC: 4.3 G/DL
ALP SERPL-CCNC: 94 U/L
ALT SERPL W/O P-5'-P-CCNC: 22 U/L
ANION GAP SERPL CALC-SCNC: 13 MMOL/L
AST SERPL-CCNC: 16 U/L
BASOPHILS # BLD AUTO: 0.01 K/UL
BASOPHILS NFR BLD: 0.2 %
BILIRUB SERPL-MCNC: 0.2 MG/DL
BUN SERPL-MCNC: 11 MG/DL
CALCIUM SERPL-MCNC: 9.5 MG/DL
CHLORIDE SERPL-SCNC: 95 MMOL/L
CO2 SERPL-SCNC: 29 MMOL/L
CREAT SERPL-MCNC: 0.58 MG/DL
DIFFERENTIAL METHOD: ABNORMAL
EOSINOPHIL # BLD AUTO: 0.1 K/UL
EOSINOPHIL NFR BLD: 1.4 %
ERYTHROCYTE [DISTWIDTH] IN BLOOD BY AUTOMATED COUNT: 12.2 %
EST. GFR  (AFRICAN AMERICAN): >60 ML/MIN/1.73 M^2
EST. GFR  (NON AFRICAN AMERICAN): >60 ML/MIN/1.73 M^2
GLUCOSE SERPL-MCNC: 103 MG/DL
HCT VFR BLD AUTO: 34.6 %
HGB BLD-MCNC: 11.4 G/DL
LYMPHOCYTES # BLD AUTO: 1.3 K/UL
LYMPHOCYTES NFR BLD: 29.6 %
MCH RBC QN AUTO: 34.2 PG
MCHC RBC AUTO-ENTMCNC: 32.9 G/DL
MCV RBC AUTO: 104 FL
MONOCYTES # BLD AUTO: 0.6 K/UL
MONOCYTES NFR BLD: 12.4 %
NEUTROPHILS # BLD AUTO: 2.5 K/UL
NEUTROPHILS NFR BLD: 56.4 %
PLATELET # BLD AUTO: 145 K/UL
PMV BLD AUTO: 10.3 FL
POTASSIUM SERPL-SCNC: 4.3 MMOL/L
PROT SERPL-MCNC: 6.9 G/DL
RBC # BLD AUTO: 3.33 M/UL
SODIUM SERPL-SCNC: 137 MMOL/L
WBC # BLD AUTO: 4.42 K/UL

## 2018-04-30 PROCEDURE — 85025 COMPLETE CBC W/AUTO DIFF WBC: CPT | Mod: PO

## 2018-04-30 PROCEDURE — 80053 COMPREHEN METABOLIC PANEL: CPT | Mod: PO

## 2018-04-30 PROCEDURE — 36415 COLL VENOUS BLD VENIPUNCTURE: CPT | Mod: PO

## 2018-05-01 ENCOUNTER — OFFICE VISIT (OUTPATIENT)
Dept: HEMATOLOGY/ONCOLOGY | Facility: CLINIC | Age: 83
End: 2018-05-01
Payer: MEDICARE

## 2018-05-01 ENCOUNTER — INFUSION (OUTPATIENT)
Dept: INFUSION THERAPY | Facility: HOSPITAL | Age: 83
End: 2018-05-01
Attending: INTERNAL MEDICINE
Payer: MEDICARE

## 2018-05-01 VITALS — OXYGEN SATURATION: 96 % | HEIGHT: 57 IN | WEIGHT: 141.31 LBS | BODY MASS INDEX: 30.49 KG/M2 | HEART RATE: 66 BPM

## 2018-05-01 DIAGNOSIS — D64.81 ANEMIA DUE TO ANTINEOPLASTIC CHEMOTHERAPY: Primary | ICD-10-CM

## 2018-05-01 DIAGNOSIS — D69.6 THROMBOCYTOPENIA, UNSPECIFIED: ICD-10-CM

## 2018-05-01 DIAGNOSIS — S42.424D CLOSED NONDISPLACED COMMINUTED SUPRACONDYLAR FRACTURE OF RIGHT HUMERUS WITHOUT INTERCONDYLAR FRACTURE WITH ROUTINE HEALING, SUBSEQUENT ENCOUNTER: ICD-10-CM

## 2018-05-01 DIAGNOSIS — C83.30 DIFFUSE LARGE B-CELL LYMPHOMA, UNSPECIFIED BODY REGION: Primary | ICD-10-CM

## 2018-05-01 DIAGNOSIS — I10 ESSENTIAL HYPERTENSION: ICD-10-CM

## 2018-05-01 DIAGNOSIS — T45.1X5A ANEMIA DUE TO ANTINEOPLASTIC CHEMOTHERAPY: Primary | ICD-10-CM

## 2018-05-01 DIAGNOSIS — D53.9 NUTRITIONAL ANEMIA: ICD-10-CM

## 2018-05-01 DIAGNOSIS — G47.00 INSOMNIA, UNSPECIFIED TYPE: ICD-10-CM

## 2018-05-01 DIAGNOSIS — D64.81 ANEMIA DUE TO ANTINEOPLASTIC CHEMOTHERAPY: ICD-10-CM

## 2018-05-01 DIAGNOSIS — T45.1X5A ANEMIA DUE TO ANTINEOPLASTIC CHEMOTHERAPY: ICD-10-CM

## 2018-05-01 PROCEDURE — A4216 STERILE WATER/SALINE, 10 ML: HCPCS | Performed by: INTERNAL MEDICINE

## 2018-05-01 PROCEDURE — 25000003 PHARM REV CODE 250: Performed by: INTERNAL MEDICINE

## 2018-05-01 PROCEDURE — 96523 IRRIG DRUG DELIVERY DEVICE: CPT

## 2018-05-01 PROCEDURE — 99213 OFFICE O/P EST LOW 20 MIN: CPT | Mod: PBBFAC,PO,25 | Performed by: INTERNAL MEDICINE

## 2018-05-01 PROCEDURE — 99214 OFFICE O/P EST MOD 30 MIN: CPT | Mod: S$PBB,,, | Performed by: INTERNAL MEDICINE

## 2018-05-01 PROCEDURE — 63600175 PHARM REV CODE 636 W HCPCS: Performed by: INTERNAL MEDICINE

## 2018-05-01 PROCEDURE — 99999 PR PBB SHADOW E&M-EST. PATIENT-LVL III: CPT | Mod: PBBFAC,,, | Performed by: INTERNAL MEDICINE

## 2018-05-01 RX ORDER — SODIUM CHLORIDE 0.9 % (FLUSH) 0.9 %
10 SYRINGE (ML) INJECTION
Status: CANCELLED | OUTPATIENT
Start: 2018-05-01

## 2018-05-01 RX ORDER — TRAZODONE HYDROCHLORIDE 50 MG/1
50 TABLET ORAL NIGHTLY
Qty: 60 TABLET | Refills: 2 | Status: SHIPPED | OUTPATIENT
Start: 2018-05-01 | End: 2018-07-26

## 2018-05-01 RX ORDER — SODIUM CHLORIDE 0.9 % (FLUSH) 0.9 %
10 SYRINGE (ML) INJECTION
Status: COMPLETED | OUTPATIENT
Start: 2018-05-01 | End: 2018-05-01

## 2018-05-01 RX ORDER — HEPARIN 100 UNIT/ML
500 SYRINGE INTRAVENOUS
Status: CANCELLED | OUTPATIENT
Start: 2018-05-01

## 2018-05-01 RX ORDER — HEPARIN 100 UNIT/ML
500 SYRINGE INTRAVENOUS
Status: COMPLETED | OUTPATIENT
Start: 2018-05-01 | End: 2018-05-01

## 2018-05-01 RX ADMIN — Medication 500 UNITS: at 10:05

## 2018-05-01 RX ADMIN — SODIUM CHLORIDE, PRESERVATIVE FREE 10 ML: 5 INJECTION INTRAVENOUS at 10:05

## 2018-05-01 NOTE — PATIENT INSTRUCTIONS
FALL PREVENTION   Falls often occur due to slipping, tripping or losing your balance. Here are ways to reduce your risk of falling again.   Was there anything that caused your fall that can be fixed, removed or replaced?   Make your home safe by keeping walkways clear of objects you may trip over.   Use non-slip pads under rugs.   Do not walk in poorly lit areas.   Do not stand on chairs or wobbly ladders.   Use caution when reaching overhead or looking upward. This position can cause a loss of balance.   Be sure your shoes fit properly, have non-slip bottoms and are in good condition.   Be cautious when going up and down stairs, curbs, and when walking on uneven sidewalks.   If your balance is poor, consider using a cane or walker.   If your fall was related to alcohol use, stop or limit alcohol intake.   If your fall was related to use of sleeping medicines, talk to your doctor about this. You may need to reduce your dosage at bedtime if you awaken during the night to go to the bathroom.   To reduce the need for nighttime bathroom trips:   Avoid drinking fluids for several hours before going to bed   Empty your bladder before going to bed   Men can keep a urinal at the bedside   © 9934-8550 [employer] [facility], [address] [place] [address], [place] [address]. All rights reserved. This information is not intended as a substitute for professional medical care. Always follow your healthcare professional's instructions.

## 2018-05-01 NOTE — NURSING
"Pt here for Mediport Flush. Left chestwall mediport accessed with a 20g 1" kaur via sterile technique by Katie Tesfaye RN.  Excellent blood return noted.  Flushed with 10ml NS and 5 ml heparin solution.  Needle D/C, site without redness, swelling, or drainage noted.  Bandaid applied.  Patient tolerated well.    "

## 2018-05-01 NOTE — PROGRESS NOTES
Subjective:       Patient ID: Annette Garcia is a 82 y.o. female.    Chief Complaint: No chief complaint on file.    HPI     She is here for follow-up of Diffuse large B-cell lymphoma.  She was having abdominal discomfort for over 4 months and a CT scan done on 8/15/16 showed dilated loops of small bowel. She underwent explorative laparotomy (8/23) with resection of a segment of her small bowel by  - 2 different areas of strictures was noticed intraoperatively. Pathology revealed diffuse large B-cell lymphoma - germinal center origin (CD10 negative, BCL6 positive, MUM1 negative). Ki-67 was elevated at 75%.    PET scan showed stage III disease. No lymphoma on bone marrow biopsy.    Got 6 cycles of R-CHOP chemotherapy. Completed in Jan 2017.    PET scan (12/20/16) - Complete resolution of the hypermetabolic abnormality.     She was admitted to the hospital on 1/26 with pancytopenia, dehydration and symptomatic anemia and received blood.      She fell at home in march 2017 and fractured her humerus. Sees .    Her  passed 2 in jan 2018    Has difficulty sleeping. Arthritis in arm bothering her. Otherwise blood pressure controlled    Review of Systems   Constitutional: Negative for appetite change, fatigue, fever and unexpected weight change.   HENT: Negative for facial swelling and nosebleeds.    Eyes: Negative for photophobia and pain.   Respiratory: Negative for cough and shortness of breath.    Cardiovascular: Negative for chest pain and leg swelling.   Gastrointestinal: Negative for abdominal pain, blood in stool and nausea.   Genitourinary: Negative for dysuria and hematuria.   Skin: Negative for color change and rash.   Neurological: Negative for seizures, weakness and headaches.   Hematological: Negative for adenopathy. Does not bruise/bleed easily.       Objective:      Physical Exam   Constitutional: She is oriented to person, place, and time. No distress.   HENT:   Mouth/Throat:  No oropharyngeal exudate.   Eyes: No scleral icterus.   Neck: Neck supple.   Cardiovascular: Normal rate.  Exam reveals no gallop.    Pulmonary/Chest: Effort normal. She has no wheezes. She has no rales.   Abdominal: Soft. There is no tenderness.   Musculoskeletal: She exhibits no edema.   Lymphadenopathy:     She has no cervical adenopathy.   Neurological: She is alert and oriented to person, place, and time.   Skin: She is not diaphoretic.       Assessment:     1. Diffuse large B-cell lymphoma, unspecified body region    2. Thrombocytopenia, unspecified    3. Closed nondisplaced comminuted supracondylar fracture of right humerus without intercondylar fracture with routine healing, subsequent encounter    4. Essential hypertension    5. Anemia due to antineoplastic chemotherapy    6. Nutritional anemia           Plan:   In summary this is a 82-year-old female with stage III diffuse large B-cell lymphoma.    She status post 6 cycles of Rituxan CHOP chemotherapy for diffuse large B-cell lymphoma - completed in Jan 2017.    Labs reviewed.  Hemoglobin stable.  Thrombocytopenia better    Recent labs unremarkable.    Will continue to monitor.  Repeat labs and check restaging scans in August 2018.    Port flush after follow-up.    Plan port removal in January 2019    Trial of Trazodone for insomnia

## 2018-07-26 ENCOUNTER — OFFICE VISIT (OUTPATIENT)
Dept: CARDIOLOGY | Facility: CLINIC | Age: 83
End: 2018-07-26
Payer: MEDICARE

## 2018-07-26 VITALS
WEIGHT: 143 LBS | BODY MASS INDEX: 30.85 KG/M2 | SYSTOLIC BLOOD PRESSURE: 161 MMHG | HEIGHT: 57 IN | HEART RATE: 68 BPM | DIASTOLIC BLOOD PRESSURE: 78 MMHG

## 2018-07-26 DIAGNOSIS — I70.1 BILATERAL RENAL ARTERY STENOSIS: ICD-10-CM

## 2018-07-26 DIAGNOSIS — I15.0 RENOVASCULAR HYPERTENSION: Primary | Chronic | ICD-10-CM

## 2018-07-26 DIAGNOSIS — M46.1 DEGENERATIVE JOINT DISEASE OF SACROILIAC JOINT: Chronic | ICD-10-CM

## 2018-07-26 DIAGNOSIS — E03.9 ACQUIRED HYPOTHYROIDISM: Chronic | ICD-10-CM

## 2018-07-26 PROCEDURE — 99213 OFFICE O/P EST LOW 20 MIN: CPT | Mod: PBBFAC,PO | Performed by: INTERNAL MEDICINE

## 2018-07-26 PROCEDURE — 99999 PR PBB SHADOW E&M-EST. PATIENT-LVL III: CPT | Mod: PBBFAC,,, | Performed by: INTERNAL MEDICINE

## 2018-07-26 PROCEDURE — 99214 OFFICE O/P EST MOD 30 MIN: CPT | Mod: S$PBB,,, | Performed by: INTERNAL MEDICINE

## 2018-07-26 RX ORDER — CLONIDINE HYDROCHLORIDE 0.2 MG/1
0.2 TABLET ORAL 2 TIMES DAILY
Qty: 60 TABLET | Refills: 11 | Status: SHIPPED | OUTPATIENT
Start: 2018-07-26 | End: 2018-09-06

## 2018-07-26 NOTE — PROGRESS NOTES
Subjective:    Patient ID:  Annette Garcia is a 82 y.o. female who presents for follow-up of Hypertension      HPI  83 y/o female with hx of difficult to control HTN (previously on a diuretic but dicsontinued due to recurrent dehydration), renal artery stenosis (LRA severe ostial disease s/p YOLANDA, RRA with mild-mod disease), anemia, acquired hypothyroidism, diffuse large B-cell lymphoma on chemo (last PET showed remission). She presents for f/u HTN and renal artery stenosis.  Last clinic visit had BP log that was improved. Has with her some recorded measurements that are elevate in the 170-200's. Feels doxazosin not working. Compliant with meds. She feels good and denies CP, SOB, orthopnea, PND, syncope. Has ZARAGOZA which is chronic and some fatigue. Has had prior visits admitting to dietary indiscretion with high sodium diet including Nicole's Pie, Corn bisque, and onion rings (previously was V8, canned foods, TV dinners, soft drinks, gumbo, chinese food).    Review of Systems   Constitution: Positive for weakness and malaise/fatigue.   HENT: Negative for congestion.    Eyes: Negative for blurred vision.   Cardiovascular: Negative for chest pain, claudication, cyanosis, dyspnea on exertion, irregular heartbeat, leg swelling, near-syncope, orthopnea, palpitations, paroxysmal nocturnal dyspnea and syncope.   Respiratory: Negative for shortness of breath.    Endocrine: Negative for polyuria.   Hematologic/Lymphatic: Negative for bleeding problem.   Skin: Negative for itching and rash.   Musculoskeletal: Negative for joint swelling, muscle cramps and muscle weakness.   Gastrointestinal: Negative for abdominal pain, hematemesis, hematochezia, melena, nausea and vomiting.   Genitourinary: Negative for dysuria and hematuria.   Neurological: Negative for dizziness, focal weakness, headaches, light-headedness and loss of balance.   Psychiatric/Behavioral: Negative for depression. The patient is not nervous/anxious.          Objective:    Physical Exam   Constitutional: She is oriented to person, place, and time. She appears well-developed and well-nourished.   HENT:   Head: Normocephalic and atraumatic.   Neck: Neck supple. No JVD present.   Cardiovascular: Normal rate, regular rhythm and normal heart sounds.    Pulses:       Carotid pulses are 2+ on the right side, and 2+ on the left side.       Radial pulses are 2+ on the right side, and 2+ on the left side.        Femoral pulses are 2+ on the right side, and 2+ on the left side.       Posterior tibial pulses are 1+ on the right side, and 1+ on the left side.   Pulmonary/Chest: Effort normal and breath sounds normal.   Abdominal: Soft. Bowel sounds are normal.   Musculoskeletal: She exhibits no edema.   Neurological: She is alert and oriented to person, place, and time.   Skin: Skin is warm and dry.   Psychiatric: She has a normal mood and affect. Her behavior is normal. Thought content normal.         Assessment:       1. Renovascular hypertension    2. Bilateral renal artery stenosis    3. Acquired hypothyroidism    4. Degenerative joint disease of sacroiliac joint      83 y/o pt with hx and presentation as above. Doing well from a cardiac perspective and compensated from a HF perspective. Will adjust BP regimen by stopping doxazosin and starting clonidine (no hydralazine or amlodipine due to allergy list).         Plan:       -Start clonidine 0.2 mg BID  -BP log BID  -f/u in 1 month

## 2018-08-29 ENCOUNTER — HOSPITAL ENCOUNTER (OUTPATIENT)
Dept: RADIOLOGY | Facility: HOSPITAL | Age: 83
Discharge: HOME | End: 2018-08-29
Attending: INTERNAL MEDICINE
Payer: MEDICARE

## 2018-08-29 DIAGNOSIS — C83.30 DIFFUSE LARGE B-CELL LYMPHOMA, UNSPECIFIED BODY REGION: ICD-10-CM

## 2018-08-29 PROCEDURE — 25500020 PHARM REV CODE 255: Performed by: INTERNAL MEDICINE

## 2018-08-29 PROCEDURE — 74178 CT ABD&PLV WO CNTR FLWD CNTR: CPT | Mod: TC

## 2018-08-29 RX ADMIN — IOHEXOL 30 ML: 300 INJECTION, SOLUTION INTRAVENOUS at 09:08

## 2018-08-29 RX ADMIN — IOHEXOL 100 ML: 350 INJECTION, SOLUTION INTRAVENOUS at 09:08

## 2018-09-05 ENCOUNTER — OFFICE VISIT (OUTPATIENT)
Dept: HEMATOLOGY/ONCOLOGY | Facility: CLINIC | Age: 83
End: 2018-09-05
Payer: MEDICARE

## 2018-09-05 ENCOUNTER — INFUSION (OUTPATIENT)
Dept: INFUSION THERAPY | Facility: HOSPITAL | Age: 83
End: 2018-09-05
Attending: INTERNAL MEDICINE
Payer: MEDICARE

## 2018-09-05 VITALS
OXYGEN SATURATION: 96 % | TEMPERATURE: 98 F | BODY MASS INDEX: 30.39 KG/M2 | DIASTOLIC BLOOD PRESSURE: 82 MMHG | RESPIRATION RATE: 16 BRPM | SYSTOLIC BLOOD PRESSURE: 191 MMHG | WEIGHT: 140.44 LBS | HEART RATE: 64 BPM

## 2018-09-05 DIAGNOSIS — M48.54XA NON-TRAUMATIC COMPRESSION FRACTURE OF ELEVENTH THORACIC VERTEBRA, INITIAL ENCOUNTER: ICD-10-CM

## 2018-09-05 DIAGNOSIS — C83.30 DIFFUSE LARGE B-CELL LYMPHOMA, UNSPECIFIED BODY REGION: Primary | Chronic | ICD-10-CM

## 2018-09-05 DIAGNOSIS — T45.1X5A ANEMIA DUE TO ANTINEOPLASTIC CHEMOTHERAPY: Primary | ICD-10-CM

## 2018-09-05 DIAGNOSIS — D64.81 ANEMIA DUE TO ANTINEOPLASTIC CHEMOTHERAPY: Primary | ICD-10-CM

## 2018-09-05 DIAGNOSIS — M81.0 AGE RELATED OSTEOPOROSIS, UNSPECIFIED PATHOLOGICAL FRACTURE PRESENCE: ICD-10-CM

## 2018-09-05 PROCEDURE — 99213 OFFICE O/P EST LOW 20 MIN: CPT | Mod: PBBFAC,PO | Performed by: INTERNAL MEDICINE

## 2018-09-05 PROCEDURE — 99214 OFFICE O/P EST MOD 30 MIN: CPT | Mod: S$PBB,,, | Performed by: INTERNAL MEDICINE

## 2018-09-05 PROCEDURE — 25000003 PHARM REV CODE 250: Performed by: INTERNAL MEDICINE

## 2018-09-05 PROCEDURE — 99999 PR PBB SHADOW E&M-EST. PATIENT-LVL III: CPT | Mod: PBBFAC,,, | Performed by: INTERNAL MEDICINE

## 2018-09-05 PROCEDURE — 63600175 PHARM REV CODE 636 W HCPCS: Performed by: INTERNAL MEDICINE

## 2018-09-05 PROCEDURE — A4216 STERILE WATER/SALINE, 10 ML: HCPCS | Performed by: INTERNAL MEDICINE

## 2018-09-05 PROCEDURE — 96523 IRRIG DRUG DELIVERY DEVICE: CPT

## 2018-09-05 RX ORDER — SODIUM CHLORIDE 0.9 % (FLUSH) 0.9 %
10 SYRINGE (ML) INJECTION
Status: COMPLETED | OUTPATIENT
Start: 2018-09-05 | End: 2018-09-05

## 2018-09-05 RX ORDER — HEPARIN 100 UNIT/ML
500 SYRINGE INTRAVENOUS
Status: CANCELLED | OUTPATIENT
Start: 2018-09-05

## 2018-09-05 RX ORDER — SODIUM CHLORIDE 0.9 % (FLUSH) 0.9 %
10 SYRINGE (ML) INJECTION
Status: CANCELLED | OUTPATIENT
Start: 2018-09-05

## 2018-09-05 RX ORDER — HEPARIN 100 UNIT/ML
500 SYRINGE INTRAVENOUS
Status: COMPLETED | OUTPATIENT
Start: 2018-09-05 | End: 2018-09-05

## 2018-09-05 RX ADMIN — Medication 10 ML: at 03:09

## 2018-09-05 RX ADMIN — HEPARIN 500 UNITS: 100 SYRINGE at 03:09

## 2018-09-05 NOTE — NURSING
Tolerated port flush well. Flushed with NS and Heparin per protocol and de-accessed prior to discharge.

## 2018-09-05 NOTE — PROGRESS NOTES
Subjective:       Patient ID: Annette Garcia is a 82 y.o. female.    Chief Complaint: DLBCL    HPI She is here for follow-up of Diffuse large B-cell lymphoma.  She was having abdominal discomfort for over 4 months and a CT scan done on 8/15/16 showed dilated loops of small bowel. She underwent explorative laparotomy (8/23) with resection of a segment of her small bowel by  - 2 different areas of strictures was noticed intraoperatively. Pathology revealed diffuse large B-cell lymphoma - germinal center origin (CD10 negative, BCL6 positive, MUM1 negative). Ki-67 was elevated at 75%. PET scan showed stage III disease. No lymphoma on bone marrow biopsy. Got 6 cycles of R-CHOP chemotherapy. Completed in Jan 2017.    PET scan (12/20/16) - Complete resolution of the hypermetabolic abnormality.     She was admitted to the hospital on 1/26 with pancytopenia, dehydration and symptomatic anemia and received blood.      She fell at home in march 2017 and fractured her humerus. Sees .    Her  passed in jan 2018    Here for f/u. Has some new back pain.    Review of Systems   Constitutional: Negative for appetite change, fatigue, fever and unexpected weight change.   HENT: Negative for facial swelling and nosebleeds.    Eyes: Negative for photophobia and pain.   Respiratory: Negative for cough and shortness of breath.    Cardiovascular: Negative for chest pain and leg swelling.   Gastrointestinal: Negative for abdominal pain, blood in stool and nausea.   Genitourinary: Negative for dysuria and hematuria.   Skin: Negative for color change and rash.   Neurological: Negative for seizures, weakness and headaches.   Hematological: Negative for adenopathy. Does not bruise/bleed easily.       Objective:      Physical Exam   Constitutional: She is oriented to person, place, and time. No distress.   HENT:   Mouth/Throat: No oropharyngeal exudate.   Eyes: No scleral icterus.   Neck: Neck supple.    Cardiovascular: Normal rate. Exam reveals no gallop.   Pulmonary/Chest: Effort normal. She has no wheezes. She has no rales.   Abdominal: Soft. There is no tenderness.   Musculoskeletal: She exhibits no edema.   Lymphadenopathy:     She has no cervical adenopathy.   Neurological: She is alert and oriented to person, place, and time.   Skin: She is not diaphoretic.       Assessment:     1. Diffuse large B-cell lymphoma, unspecified body region    2. Age related osteoporosis, unspecified pathological fracture presence          Plan:   She completed R-CHOP in Jan 2017 for Stage III DLBCL.    Labs reviewed.  Hemoglobin stable.  Thrombocytopenia resolved.    Imaging showed Interval development of a moderate compression fracture T11. No evidence of lymphoma recurrence.    The new compression fracture at T11 is probably what is causing her back pain.   She will see her orthopedic surgeon, Dr. Harris -  At [facility] for this.  Provided her a copy of the imaging report.  She tells me Dr. Harris did surgery on her spine in the past.    She will also need a bone density/DEXA scan,  And treatment for osteoporosis as deemed appropriate based on the bone density study.  She is not ready to schedule this at this time.  She will call me back after seeing Dr. Harris.    Port flush today.    Recommended port removal, she will think about this and call me back    Addendum - 9/7/18 - pt ready to schedule DEXA scan. Will also consult pain Mx to manage T11 compression #. F/u after DEXA to discuss prolia Rx.

## 2018-09-06 ENCOUNTER — OFFICE VISIT (OUTPATIENT)
Dept: CARDIOLOGY | Facility: CLINIC | Age: 83
End: 2018-09-06
Payer: MEDICARE

## 2018-09-06 VITALS
HEART RATE: 70 BPM | WEIGHT: 141 LBS | DIASTOLIC BLOOD PRESSURE: 101 MMHG | HEIGHT: 57 IN | SYSTOLIC BLOOD PRESSURE: 200 MMHG | BODY MASS INDEX: 30.42 KG/M2

## 2018-09-06 DIAGNOSIS — D64.81 ANEMIA DUE TO ANTINEOPLASTIC CHEMOTHERAPY: Chronic | ICD-10-CM

## 2018-09-06 DIAGNOSIS — M81.0 AGE RELATED OSTEOPOROSIS, UNSPECIFIED PATHOLOGICAL FRACTURE PRESENCE: ICD-10-CM

## 2018-09-06 DIAGNOSIS — E03.9 ACQUIRED HYPOTHYROIDISM: Chronic | ICD-10-CM

## 2018-09-06 DIAGNOSIS — E87.1 CHRONIC HYPONATREMIA: Chronic | ICD-10-CM

## 2018-09-06 DIAGNOSIS — C83.30 DIFFUSE LARGE B-CELL LYMPHOMA, UNSPECIFIED BODY REGION: Chronic | ICD-10-CM

## 2018-09-06 DIAGNOSIS — I70.1 BILATERAL RENAL ARTERY STENOSIS: ICD-10-CM

## 2018-09-06 DIAGNOSIS — I15.0 RENOVASCULAR HYPERTENSION: Primary | Chronic | ICD-10-CM

## 2018-09-06 DIAGNOSIS — T45.1X5A ANEMIA DUE TO ANTINEOPLASTIC CHEMOTHERAPY: Chronic | ICD-10-CM

## 2018-09-06 DIAGNOSIS — I10 ESSENTIAL HYPERTENSION: ICD-10-CM

## 2018-09-06 DIAGNOSIS — M47.816 OSTEOARTHRITIS OF LUMBAR SPINE, UNSPECIFIED SPINAL OSTEOARTHRITIS COMPLICATION STATUS: Chronic | ICD-10-CM

## 2018-09-06 DIAGNOSIS — D69.6 THROMBOCYTOPENIA, UNSPECIFIED: ICD-10-CM

## 2018-09-06 PROCEDURE — 99214 OFFICE O/P EST MOD 30 MIN: CPT | Mod: S$PBB,,, | Performed by: INTERNAL MEDICINE

## 2018-09-06 PROCEDURE — 99999 PR PBB SHADOW E&M-EST. PATIENT-LVL III: CPT | Mod: PBBFAC,,, | Performed by: INTERNAL MEDICINE

## 2018-09-06 PROCEDURE — 99213 OFFICE O/P EST LOW 20 MIN: CPT | Mod: PBBFAC,PO | Performed by: INTERNAL MEDICINE

## 2018-09-06 RX ORDER — CLONIDINE HYDROCHLORIDE 0.2 MG/1
0.2 TABLET ORAL 2 TIMES DAILY
Qty: 180 TABLET | Refills: 3 | Status: SHIPPED | OUTPATIENT
Start: 2018-09-06 | End: 2018-10-25 | Stop reason: SDUPTHER

## 2018-09-06 RX ORDER — LISINOPRIL 40 MG/1
40 TABLET ORAL DAILY
Qty: 90 TABLET | Refills: 3 | Status: SHIPPED | OUTPATIENT
Start: 2018-09-06 | End: 2018-10-25 | Stop reason: SDUPTHER

## 2018-09-06 RX ORDER — CARVEDILOL 25 MG/1
25 TABLET ORAL 2 TIMES DAILY WITH MEALS
Qty: 180 TABLET | Refills: 3 | Status: SHIPPED | OUTPATIENT
Start: 2018-09-06 | End: 2018-10-25 | Stop reason: SDUPTHER

## 2018-09-07 ENCOUNTER — PATIENT MESSAGE (OUTPATIENT)
Dept: HEMATOLOGY/ONCOLOGY | Facility: CLINIC | Age: 83
End: 2018-09-07

## 2018-09-07 ENCOUNTER — TELEPHONE (OUTPATIENT)
Dept: PAIN MEDICINE | Facility: CLINIC | Age: 83
End: 2018-09-07

## 2018-09-07 NOTE — TELEPHONE ENCOUNTER
Spoke with pt's daughter Amira regarding a sooner appt than 10/5/18. I informed Amira that was the soonest appt we have and if something comes available I will give her a call. Amira verbalized understanding.

## 2018-09-07 NOTE — TELEPHONE ENCOUNTER
----- Message from Jie Romero sent at 9/7/2018  1:55 PM CDT -----  Contact: [phone]/Amira/daughter  Patient's daughter is requesting a call back regarding her being seen sooner than her appointment. Thanks

## 2018-09-10 ENCOUNTER — HOSPITAL ENCOUNTER (OUTPATIENT)
Dept: RADIOLOGY | Facility: HOSPITAL | Age: 83
Discharge: HOME | End: 2018-09-10
Attending: INTERNAL MEDICINE
Payer: MEDICARE

## 2018-09-10 DIAGNOSIS — M81.0 AGE RELATED OSTEOPOROSIS, UNSPECIFIED PATHOLOGICAL FRACTURE PRESENCE: ICD-10-CM

## 2018-09-10 PROCEDURE — 77080 DXA BONE DENSITY AXIAL: CPT | Mod: TC,PO

## 2018-09-28 DIAGNOSIS — G40.209 PARTIAL SYMPTOMATIC EPILEPSY WITH COMPLEX PARTIAL SEIZURES, NOT INTRACTABLE, WITHOUT STATUS EPILEPTICUS: ICD-10-CM

## 2018-09-28 RX ORDER — PHENOBARBITAL 64.8 MG/1
64.8 TABLET ORAL NIGHTLY
Qty: 90 TABLET | Refills: 1 | Status: SHIPPED | OUTPATIENT
Start: 2018-09-28 | End: 2019-04-15 | Stop reason: SDUPTHER

## 2018-10-04 NOTE — PROGRESS NOTES
[facility] New Patient Evaluation    Referred by: Tyson Arredondo MD   Reason for referral: Non-traumatic compression fracture of eleventh thoracic vertebra, initial encounter     CC:   Chief Complaint   Patient presents with    Mid-back Pain       Last 3 PDI Scores 10/5/2018   Pain Disability Index (PDI) 42      HPI: Annette Garcia is a 83 y.o. female referred for back pain.        Location: Mid Back   Severity: Currently: 3/10   Typical Range: 5/10     Exacerbation: 10/10   Onset: 2 months ago while coughing heavily in bed  Quality: Aching  Radiation: no  Axial/Extremity Percentage of Pain: 100/0  Exacerbating Factors: twisting and Some daily activities   Mitigating Factors: rest and sitting  Assoc: denies night fever/night sweats, urinary incontinence, bowel incontinence, significant weight loss, significant motor weakness and loss of sensations    Previous Therapies:  PT: Denies  HEP: Denies  TENS:  Injections: Denies  Surgery: Yes, previously for compression fracture in L-spine  Medications:   - NSAIDS:   - MSK Relaxants:   - TCAs:   - SNRIs:   - Topicals:   - Anticonvulsants:  - Opioids:     Current Pain Medications:  1. Tylenol 325     Full Medication List:    Current Outpatient Medications:     acetaminophen (TYLENOL) 325 MG tablet, Take 2 tablets (650 mg total) by mouth every 4 (four) hours as needed for Pain., Disp: , Rfl: 0    carvedilol (COREG) 25 MG tablet, Take 1 tablet (25 mg total) by mouth 2 (two) times daily with meals., Disp: 180 tablet, Rfl: 3    cloNIDine (CATAPRES) 0.2 MG tablet, Take 1 tablet (0.2 mg total) by mouth 2 (two) times daily., Disp: 180 tablet, Rfl: 3    levothyroxine (SYNTHROID) 100 MCG tablet, TAKE 1 TABLET (100 MCG TOTAL) BY MOUTH ONCE DAILY., Disp: 90 tablet, Rfl: 3    lisinopril (PRINIVIL,ZESTRIL) 40 MG tablet, Take 1 tablet (40 mg total) by mouth once daily., Disp: 90 tablet, Rfl: 3    OXcarbazepine (TRILEPTAL) 300 MG Tab, TAKE 1 TABLET (300 MG TOTAL) BY  MOUTH NIGHTLY., Disp: 90 tablet, Rfl: 3    PHENobarbital (LUMINAL) 64.8 MG tablet, TAKE 1 TABLET (64.8 MG TOTAL) BY MOUTH EVERY EVENING., Disp: 90 tablet, Rfl: 1    polyethylene glycol (GLYCOLAX) 17 gram PwPk, Take 17 g by mouth 2 (two) times daily as needed (Constipation)., Disp: , Rfl: 0     Review of Systems:  Review of Systems   Constitutional: Negative for chills and fever.   HENT: Negative for nosebleeds.    Eyes: Negative for pain.   Respiratory: Negative for hemoptysis.    Cardiovascular: Negative for chest pain.   Gastrointestinal: Negative for nausea and vomiting.   Genitourinary: Negative for dysuria.   Skin: Negative for rash.       Allergies:  Adhesive; Penicillins; Tramadol; Avelox [moxifloxacin]; Amoxil [amoxicillin]; Aspridrox [aspirin, buffered]; Codeine; Keflex [cephalexin]; Norvasc [amlodipine]; Red dye; Sulfa (sulfonamide antibiotics); Tylenol [acetaminophen]; and Vicks vaporub [camphor-eucalyptus oil-menthol]     Medical History:  Past Medical History:   Diagnosis Date    Cancer     colon    Hypertension     Petit mal epilepsy 1954    Scoliosis of lumbar spine     Seizures     Unspecified hypothyroidism         Surgical History:  Past Surgical History:   Procedure Laterality Date    APPENDECTOMY      BACK SURGERY  1988    vertebral fracture    BACK SURGERY  02/2013    lumbar L2-5    BIOPSY-BONE MARROW Right 9/26/2016    [facility] at [facility]    CATARACT EXTRACTION, BILATERAL Bilateral     CHOLECYSTECTOMY      open    COLON SURGERY      EXPLORATORY-LAPAROTOMY, DILIP, possible bowel resection N/A 8/23/2016    Performed by Lucretia [facility] at [facility] OR    EYE SURGERY Bilateral     cataract removal with lens implant    HYSTERECTOMY      NFSCQDIBO-RHSC-M-CATH Right 9/21/2016    [facility] at [facility]    PORTACATH PLACEMENT Right 09/2016    RESECTION - SMALL BOWEL N/A 8/23/2016    [facility] at [facility]     "TONSILLECTOMY      VAGINAL HYSTERECTOMY W/ ANTERIOR AND POSTERIOR VAGINAL REPAIR          Social History:  Social History     Socioeconomic History    Marital status:      Spouse name: Not on file    Number of children: Not on file    Years of education: Not on file    Highest education level: Not on file   Social Needs    Financial resource strain: Not on file    Food insecurity - worry: Not on file    Food insecurity - inability: Not on file    Transportation needs - medical: Not on file    Transportation needs - non-medical: Not on file   Occupational History    Not on file   Tobacco Use    Smoking status: Never Smoker    Smokeless tobacco: Never Used   Substance and Sexual Activity    Alcohol use: No    Drug use: No    Sexual activity: Not on file   Other Topics Concern    Not on file   Social History Narrative    Not on file       Physical Exam:  Vitals:    10/05/18 0945   BP: 129/74   Pulse: 66   Weight: 64 kg (141 lb)   Height: 4' 9" (1.448 m)   PainSc:   3     General    Nursing note and vitals reviewed.  Constitutional: She is oriented to person, place, and time. She appears well-developed and well-nourished. No distress.   HENT:   Head: Normocephalic and atraumatic.   Nose: Nose normal.   Eyes: Conjunctivae and EOM are normal. Pupils are equal, round, and reactive to light. Right eye exhibits no discharge. Left eye exhibits no discharge. No scleral icterus.   Neck: No JVD present.   Cardiovascular: Intact distal pulses.    Pulmonary/Chest: Effort normal. No respiratory distress.   Abdominal: She exhibits no distension.   Neurological: She is alert and oriented to person, place, and time. Coordination normal.   Psychiatric: She has a normal mood and affect. Her behavior is normal. Judgment and thought content normal.     General Musculoskeletal Exam   Gait: normal     Back (L-Spine & T-Spine) / Neck (C-Spine) Exam     Tenderness Posterior midline palpation reveals tenderness of the " Upper L-Spine and Upper T-Spine.     Back (L-Spine & T-Spine) Range of Motion   Back extension: facet loading is positive and exacerabtes/reproduces the patient's typical low back pain    Back flexion: limited ROM but partial relief of low back pain noted.     Spinal Sensation   Right Side Sensation  L-Spine Level: normal  Left Side Sensation  L-Spine Level: normal    Other She has no scoliosis .      Muscle Strength   Right Lower Extremity   Hip Flexion: 5/5   Hip Extensors: 5/5  Quadriceps:  5/5   Hamstrin/5   Gastrocsoleus:  5/5/5  Left Lower Extremity   Hip Flexion: 5/5   Hip Extensors: 5/5  Quadriceps:  5/5   Hamstrin/5   Gastrocsoleus:  5/5/5    Reflexes     Left Side  Quadriceps:  2+  Achilles:  2+    Right Side   Quadriceps:  2+  Achilles:  2+      Imaging:  CT abdomen and pelvis with and without contrast 2018  My review of the images is significant for multilevel degenerative disc disease.  Sagittal images show at least 3 compression fractures throughout the thoracolumbar spine at T11, L2, and L4.  There does not appear to be retropulsion associated with the compression fracture at T11 which was not present on the CT scan from 2018.  Advanced degenerative disc disease is most notable at L2-3, L3-4, L4-5, and L5-S1 with vacuum disc phenomenon noted at these levels.  There is bilateral facet hypertrophy throughout the lumbar spine.    Labs:  BMP  Lab Results   Component Value Date     2018    K 4.2 2018    CL 97 2018    CO2 28 2018    BUN 12 2018    CREATININE 0.61 2018    CALCIUM 9.6 2018    ANIONGAP 11 2018    ESTGFRAFRICA >60.0 2018    EGFRNONAA >60.0 2018     Lab Results   Component Value Date    ALT 12 2018    AST 18 2018    ALKPHOS 139 (H) 2018    BILITOT 0.4 2018       Assessment:  Problem List Items Addressed This Visit     Age-related osteoporosis with current pathological fracture with  routine healing    Closed wedge compression fracture of eleventh thoracic vertebra with routine healing - Primary    DDD (degenerative disc disease), lumbar    Chronic bilateral low back pain without sciatica          This is a very pleasant 83-year-old female who presents for evaluation with her daughter-in-law.  Her active medical problems consist of hypertension, epilepsy, scoliosis, and hypothyroidism.  She has a history of diffuse large B-cell lymphoma which appears to have responded well to treatment.  Her history, imaging, and examination support the diagnosis of acute compression fracture at T11 the pain from which has slowly improved over the past 2 months.  She reports pain of 4/10 in clinic today but this represents an exacerbation of pain as she attended a  yesterday.  Typically, her pain has been 2/10 and managed with over-the-counter Tylenol.    Given that her symptoms are improving, I have recommended a conservative approach for managing her pain beginning with physical therapy and intermittent use of a lumbar back brace for no more than 2 hr per day when engaging in strenuous activities.  Should her pain persist, then we will consider kyphoplasty.  Additionally, I have recommended that she use Tylenol 1000 mg t.i.d. p.r.n. as this medication has worked very well for her pain in the past.    She will return to clinic in 4 the 8 weeks for evaluation after completion of physical therapy.    Treatment Plan:   PT/OT/HEP: Referral placed today for low back rehab  Procedures: None at this time.   Medications: Cont Tylenol but increase to 1000 mg TID PRN  Imaging: No additional imaging recommended at this time.  Labs: Reviewed.  Medications are appropriately dosed for current hepatorenal function.    Follow Up: RTC 4-8 weeks    Chirag Bates Jr, MD  Interventional Pain Medicine / Anesthesiology    Disclaimer: This note was partly generated using dictation software which may occasionally  result in transcription errors.

## 2018-10-05 ENCOUNTER — OFFICE VISIT (OUTPATIENT)
Dept: HEMATOLOGY/ONCOLOGY | Facility: CLINIC | Age: 83
End: 2018-10-05
Payer: MEDICARE

## 2018-10-05 ENCOUNTER — OFFICE VISIT (OUTPATIENT)
Dept: PAIN MEDICINE | Facility: CLINIC | Age: 83
End: 2018-10-05
Payer: MEDICARE

## 2018-10-05 ENCOUNTER — INFUSION (OUTPATIENT)
Dept: INFUSION THERAPY | Facility: HOSPITAL | Age: 83
End: 2018-10-05
Attending: INTERNAL MEDICINE
Payer: MEDICARE

## 2018-10-05 VITALS
DIASTOLIC BLOOD PRESSURE: 74 MMHG | BODY MASS INDEX: 30.42 KG/M2 | SYSTOLIC BLOOD PRESSURE: 129 MMHG | WEIGHT: 141 LBS | HEIGHT: 57 IN | HEART RATE: 66 BPM

## 2018-10-05 VITALS
SYSTOLIC BLOOD PRESSURE: 159 MMHG | TEMPERATURE: 98 F | HEART RATE: 62 BPM | BODY MASS INDEX: 30.29 KG/M2 | WEIGHT: 140 LBS | RESPIRATION RATE: 16 BRPM | DIASTOLIC BLOOD PRESSURE: 78 MMHG | OXYGEN SATURATION: 96 %

## 2018-10-05 DIAGNOSIS — M54.50 CHRONIC BILATERAL LOW BACK PAIN WITHOUT SCIATICA: ICD-10-CM

## 2018-10-05 DIAGNOSIS — M80.00XD AGE-RELATED OSTEOPOROSIS WITH CURRENT PATHOLOGICAL FRACTURE WITH ROUTINE HEALING: ICD-10-CM

## 2018-10-05 DIAGNOSIS — G89.29 CHRONIC BILATERAL LOW BACK PAIN WITHOUT SCIATICA: ICD-10-CM

## 2018-10-05 DIAGNOSIS — M51.36 DDD (DEGENERATIVE DISC DISEASE), LUMBAR: ICD-10-CM

## 2018-10-05 DIAGNOSIS — M46.1 DEGENERATIVE JOINT DISEASE OF SACROILIAC JOINT: Chronic | ICD-10-CM

## 2018-10-05 DIAGNOSIS — M81.0 AGE-RELATED OSTEOPOROSIS WITHOUT CURRENT PATHOLOGICAL FRACTURE: Primary | ICD-10-CM

## 2018-10-05 DIAGNOSIS — S22.080D CLOSED WEDGE COMPRESSION FRACTURE OF ELEVENTH THORACIC VERTEBRA WITH ROUTINE HEALING, SUBSEQUENT ENCOUNTER: Primary | ICD-10-CM

## 2018-10-05 DIAGNOSIS — M94.9 DISORDER OF CARTILAGE: ICD-10-CM

## 2018-10-05 DIAGNOSIS — C83.30 DIFFUSE LARGE B-CELL LYMPHOMA, UNSPECIFIED BODY REGION: Primary | Chronic | ICD-10-CM

## 2018-10-05 PROCEDURE — 99213 OFFICE O/P EST LOW 20 MIN: CPT | Mod: PBBFAC,25,27,PO | Performed by: PAIN MEDICINE

## 2018-10-05 PROCEDURE — 99204 OFFICE O/P NEW MOD 45 MIN: CPT | Mod: S$PBB,,, | Performed by: PAIN MEDICINE

## 2018-10-05 PROCEDURE — 99999 PR PBB SHADOW E&M-EST. PATIENT-LVL III: CPT | Mod: PBBFAC,,, | Performed by: PAIN MEDICINE

## 2018-10-05 PROCEDURE — 99214 OFFICE O/P EST MOD 30 MIN: CPT | Mod: S$PBB,,, | Performed by: INTERNAL MEDICINE

## 2018-10-05 PROCEDURE — 99213 OFFICE O/P EST LOW 20 MIN: CPT | Mod: PBBFAC,PO | Performed by: INTERNAL MEDICINE

## 2018-10-05 PROCEDURE — 96372 THER/PROPH/DIAG INJ SC/IM: CPT

## 2018-10-05 PROCEDURE — 63600175 PHARM REV CODE 636 W HCPCS: Mod: JG | Performed by: INTERNAL MEDICINE

## 2018-10-05 PROCEDURE — 99999 PR PBB SHADOW E&M-EST. PATIENT-LVL III: CPT | Mod: PBBFAC,,, | Performed by: INTERNAL MEDICINE

## 2018-10-05 RX ORDER — DOXAZOSIN 1 MG/1
TABLET ORAL
COMMUNITY
Start: 2018-09-28 | End: 2018-10-25

## 2018-10-05 RX ADMIN — DENOSUMAB 60 MG: 60 INJECTION SUBCUTANEOUS at 12:10

## 2018-10-05 NOTE — NURSING
Tolerated Prolia injection well. Instructed pt to continue her Ca and Vit D, increase PO water over next 48 hours, no major dental work within 3 mos of injection. Verbalized understanding  Discharged home with next appointment scheduled.

## 2018-10-05 NOTE — PROGRESS NOTES
Subjective:       Patient ID: Annette Garcia is a 83 y.o. female.    Chief Complaint: Lymphoma    HPI She is here for follow-up of Diffuse large B-cell lymphoma.  She was having abdominal discomfort for over 4 months and a CT scan done on 8/15/16 showed dilated loops of small bowel. She underwent explorative laparotomy (8/23) with resection of a segment of her small bowel by  - 2 different areas of strictures was noticed intraoperatively. Pathology revealed diffuse large B-cell lymphoma - germinal center origin (CD10 negative, BCL6 positive, MUM1 negative). Ki-67 was elevated at 75%. PET scan showed stage III disease. No lymphoma on bone marrow biopsy. Got 6 cycles of R-CHOP chemotherapy. Completed in Jan 2017.    PET scan (12/20/16) - Complete resolution of the hypermetabolic abnormality.     She was admitted to the hospital on 1/26 with pancytopenia, dehydration and symptomatic anemia and received blood.      She fell at home in march 2017 and fractured her humerus. Sees .    Her  passed in jan 2018    Here for f/u. Has some new back pain. Saw pain Mx - getting PT    Has osteoporosis    Review of Systems   Constitutional: Negative for appetite change, fatigue, fever and unexpected weight change.   HENT: Negative for facial swelling and nosebleeds.    Eyes: Negative for photophobia and pain.   Respiratory: Negative for cough and shortness of breath.    Cardiovascular: Negative for chest pain and leg swelling.   Gastrointestinal: Negative for abdominal pain, blood in stool and nausea.   Genitourinary: Negative for dysuria and hematuria.   Skin: Negative for color change and rash.   Neurological: Negative for seizures, weakness and headaches.   Hematological: Negative for adenopathy. Does not bruise/bleed easily.       Objective:      Physical Exam   Constitutional: She is oriented to person, place, and time. No distress.   HENT:   Mouth/Throat: No oropharyngeal exudate.   Eyes: No  scleral icterus.   Neck: Neck supple.   Cardiovascular: Normal rate. Exam reveals no gallop.   Pulmonary/Chest: Effort normal. She has no wheezes. She has no rales.   Abdominal: Soft. There is no tenderness.   Musculoskeletal: She exhibits no edema.   Lymphadenopathy:     She has no cervical adenopathy.   Neurological: She is alert and oriented to person, place, and time.   Skin: She is not diaphoretic.       Assessment:     1. Diffuse large B-cell lymphoma, unspecified body region    2. Disorder of cartilage     3. Degenerative joint disease of sacroiliac joint          Plan:   She completed R-CHOP in Jan 2017 for Stage III DLBCL.    Labs reviewed.  Hemoglobin stable.  Thrombocytopenia resolved.    Imaging showed Interval development of a moderate compression fracture T11. No evidence of lymphoma recurrence. F/u with pain Mx.    She has osteoporosis bilateral hips.  No teeth issues.  Will start Prolia.  First dose today.    Follow-up in 6 months.  Repeat labs.  Will arrange 2nd dose Prolia at that time.    Recommended port removal, she will think about this and call me back

## 2018-10-05 NOTE — LETTER
October 5, 2018      Tyson Arredondo MD  [address]  [facility] [address]           [facility]  [address]  [facility] [address]  Phone: [phone]          Patient: Annette Garcia   MR Number: [id number]   YOB: 1935   Date of Visit: 10/5/2018       Dear Dr. Tyson Arredondo:    Thank you for referring Annette Garcia to me for evaluation. Attached you will find relevant portions of my assessment and plan of care.    If you have questions, please do not hesitate to call me. I look forward to following Annette Garcia along with you.    Sincerely,    Chirag Bates Jr., MD    Enclosure  CC:  No Recipients    If you would like to receive this communication electronically, please contact [email or web address] or [phone] to request more information on [employer] access.    For providers and/or their staff who would like to refer a patient to [facility], please contact us through our one-stop-shop provider referral line, [facility], at [phone].    If you feel you have received this communication in error or would no longer like to receive these types of communications, please e-mail [email or web address]

## 2018-10-09 ENCOUNTER — PATIENT MESSAGE (OUTPATIENT)
Dept: NEUROLOGY | Facility: CLINIC | Age: 83
End: 2018-10-09

## 2018-10-10 ENCOUNTER — OFFICE VISIT (OUTPATIENT)
Dept: FAMILY MEDICINE | Facility: CLINIC | Age: 83
End: 2018-10-10
Payer: MEDICARE

## 2018-10-10 ENCOUNTER — TELEPHONE (OUTPATIENT)
Dept: FAMILY MEDICINE | Facility: CLINIC | Age: 83
End: 2018-10-10

## 2018-10-10 VITALS
DIASTOLIC BLOOD PRESSURE: 88 MMHG | HEART RATE: 80 BPM | SYSTOLIC BLOOD PRESSURE: 138 MMHG | TEMPERATURE: 99 F | HEIGHT: 59 IN | BODY MASS INDEX: 28.17 KG/M2 | WEIGHT: 139.75 LBS | OXYGEN SATURATION: 97 %

## 2018-10-10 DIAGNOSIS — Z23 NEED FOR INFLUENZA VACCINATION: ICD-10-CM

## 2018-10-10 DIAGNOSIS — M51.36 DDD (DEGENERATIVE DISC DISEASE), LUMBAR: ICD-10-CM

## 2018-10-10 DIAGNOSIS — M46.1 DEGENERATIVE JOINT DISEASE OF SACROILIAC JOINT: Chronic | ICD-10-CM

## 2018-10-10 DIAGNOSIS — M54.50 CHRONIC BILATERAL LOW BACK PAIN WITHOUT SCIATICA: Primary | ICD-10-CM

## 2018-10-10 DIAGNOSIS — Z23 NEED FOR PNEUMOCOCCAL VACCINATION: ICD-10-CM

## 2018-10-10 DIAGNOSIS — G89.29 CHRONIC BILATERAL LOW BACK PAIN WITHOUT SCIATICA: Primary | ICD-10-CM

## 2018-10-10 DIAGNOSIS — M80.00XD AGE-RELATED OSTEOPOROSIS WITH CURRENT PATHOLOGICAL FRACTURE WITH ROUTINE HEALING: ICD-10-CM

## 2018-10-10 DIAGNOSIS — S22.080D CLOSED WEDGE COMPRESSION FRACTURE OF ELEVENTH THORACIC VERTEBRA WITH ROUTINE HEALING: Primary | ICD-10-CM

## 2018-10-10 PROCEDURE — G0008 ADMIN INFLUENZA VIRUS VAC: HCPCS | Mod: S$GLB,,, | Performed by: FAMILY MEDICINE

## 2018-10-10 PROCEDURE — 99213 OFFICE O/P EST LOW 20 MIN: CPT | Mod: 25,S$GLB,, | Performed by: FAMILY MEDICINE

## 2018-10-10 PROCEDURE — G0009 ADMIN PNEUMOCOCCAL VACCINE: HCPCS | Mod: S$GLB,,, | Performed by: FAMILY MEDICINE

## 2018-10-10 PROCEDURE — 90662 IIV NO PRSV INCREASED AG IM: CPT | Mod: S$GLB,,, | Performed by: FAMILY MEDICINE

## 2018-10-10 PROCEDURE — 90732 PPSV23 VACC 2 YRS+ SUBQ/IM: CPT | Mod: S$GLB,,, | Performed by: FAMILY MEDICINE

## 2018-10-10 RX ORDER — ARM BRACE
EACH MISCELLANEOUS
Qty: 1 EACH | COMMUNITY
Start: 2018-10-10 | End: 2019-10-18

## 2018-10-10 NOTE — TELEPHONE ENCOUNTER
Ms Garcia was in to see Dr Valles today and was questioning her PT orders  I see where Dr Bates wrote in his last office note that he would order PT. I do not see any orders. She would like to have orders placed to [facility] in [facility].  Could you please check on these orders?  Thank you,  [place]

## 2018-10-14 NOTE — PROGRESS NOTES
Patient ID: Annette Garcia is a 83 y.o. female.    Chief Complaint: Back Pain (would like back brace) and Hearing Problem    HPI      Annette Garcia is a 83 y.o. female complains of back pain.  Some recent imaging demonstrates compression fractures-exact age is not known.  Does continue to have pain.  Has seen pain management.  Suggestion of back brace for comfort.  This pain causes a significant drag on her daily life. She does not feel so HOT anymore.      Review of Symptoms    Constitutional  activity change-decreased because of pain, No chills /fever   Resp  Neg hemoptysis, stridor, choking  CVS  Neg chest pain, palpitations    Physical Exam    Constitutional:  Oriented to person, place, and time.appears well-developed and well-nourished.  No distress.      HENT  Head: Normocephalic and atraumatic  Right Ear: External ear normal.   Left Ear: External ear normal.   Nose: External nose normal.   Mouth:  Moist mucus membranes.    Eyes:  Conjunctivae are normal. Right eye exhibits no discharge.  Left eye exhibits no discharge. No scleral icterus.  No periorbital edema    Cardiovascular:  Regular rate and rhythm with normal S1 and S2     Pulmonary/Chest:   Clear to auscultation bilaterally without wheezes, rhonchi or rales      Musculoskeletal:  No edema. No obvious deformity No wasting  Walking more slowly and gently because of pain       Neurological:  Alert and oriented to person, place, and time.   Coordination normal.     Skin:   Skin is warm and dry.  No diaphoresis.   No rash noted.     Psychiatric: Normal mood and affect. Behavior is normal.  Judgment and thought content normal.     Complete Blood Count  Lab Results   Component Value Date    RBC 3.55 (L) 08/29/2018    HGB 12.1 08/29/2018    HCT 35.7 (L) 08/29/2018     (H) 08/29/2018    MCH 34.1 (H) 08/29/2018    MCHC 33.9 08/29/2018    RDW 12.5 08/29/2018     08/29/2018    MPV 9.7 08/29/2018    GRAN 2.6 08/29/2018    GRAN 58.0  08/29/2018    LYMPH 1.2 08/29/2018    LYMPH 26.6 08/29/2018    MONO 0.5 08/29/2018    MONO 11.4 08/29/2018    EOS 0.2 08/29/2018    BASO 0.03 08/29/2018    EOSINOPHIL 3.3 08/29/2018    BASOPHIL 0.7 08/29/2018    DIFFMETHOD Automated 08/29/2018       Comprehensive Metabolic Panel  Lab Results   Component Value Date     (H) 08/29/2018    BUN 12 08/29/2018    CREATININE 0.61 08/29/2018     08/29/2018    K 4.2 08/29/2018    CL 97 08/29/2018    PROT 7.6 08/29/2018    ALBUMIN 4.4 08/29/2018    BILITOT 0.4 08/29/2018    AST 18 08/29/2018    ALKPHOS 139 (H) 08/29/2018    CO2 28 08/29/2018    ALT 12 08/29/2018    ANIONGAP 11 08/29/2018    EGFRNONAA >60.0 08/29/2018    ESTGFRAFRICA >60.0 08/29/2018       TSH  No results found for: TSH    Assessment / Plan:      ICD-10-CM ICD-9-CM   1. Closed wedge compression fracture of eleventh thoracic vertebra with routine healing S22.080D V54.17   2. DDD (degenerative disc disease), lumbar M51.36 722.52   3. Age-related osteoporosis with current pathological fracture with routine healing M80.00XD V54.29   4. Need for influenza vaccination Z23 V04.81   5. Need for pneumococcal vaccination Z23 V03.82     Closed wedge compression fracture of eleventh thoracic vertebra with routine healing  -     back brace Misc; Lumbar back brace bc of compression fracture  Dispense: 1 each    DDD (degenerative disc disease), lumbar  -     back brace Misc; Lumbar back brace bc of compression fracture  Dispense: 1 each  -     Back/Cervical Brace For Home Use    Age-related osteoporosis with current pathological fracture with routine healing  -     back brace Misc; Lumbar back brace bc of compression fracture  Dispense: 1 each    Need for influenza vaccination  -     Influenza - High Dose (65+) (PF) (IM)    Need for pneumococcal vaccination  -     Pneumococcal Polysaccharide Vaccine (23 Valent) (SQ/IM)

## 2018-10-16 ENCOUNTER — PATIENT MESSAGE (OUTPATIENT)
Dept: PAIN MEDICINE | Facility: CLINIC | Age: 83
End: 2018-10-16

## 2018-10-25 ENCOUNTER — OFFICE VISIT (OUTPATIENT)
Dept: CARDIOLOGY | Facility: CLINIC | Age: 83
End: 2018-10-25
Payer: MEDICARE

## 2018-10-25 VITALS
HEIGHT: 59 IN | OXYGEN SATURATION: 80 % | WEIGHT: 138 LBS | DIASTOLIC BLOOD PRESSURE: 70 MMHG | BODY MASS INDEX: 27.82 KG/M2 | SYSTOLIC BLOOD PRESSURE: 146 MMHG | HEART RATE: 87 BPM

## 2018-10-25 DIAGNOSIS — I10 ESSENTIAL HYPERTENSION: ICD-10-CM

## 2018-10-25 DIAGNOSIS — E03.9 ACQUIRED HYPOTHYROIDISM: Chronic | ICD-10-CM

## 2018-10-25 DIAGNOSIS — E87.1 CHRONIC HYPONATREMIA: Chronic | ICD-10-CM

## 2018-10-25 DIAGNOSIS — S22.080D CLOSED WEDGE COMPRESSION FRACTURE OF ELEVENTH THORACIC VERTEBRA WITH ROUTINE HEALING: ICD-10-CM

## 2018-10-25 DIAGNOSIS — I15.0 RENOVASCULAR HYPERTENSION: Primary | Chronic | ICD-10-CM

## 2018-10-25 DIAGNOSIS — M54.50 CHRONIC BILATERAL LOW BACK PAIN WITHOUT SCIATICA: ICD-10-CM

## 2018-10-25 DIAGNOSIS — D69.6 THROMBOCYTOPENIA, UNSPECIFIED: ICD-10-CM

## 2018-10-25 DIAGNOSIS — C83.30 DIFFUSE LARGE B-CELL LYMPHOMA, UNSPECIFIED BODY REGION: Chronic | ICD-10-CM

## 2018-10-25 DIAGNOSIS — M80.00XD AGE-RELATED OSTEOPOROSIS WITH CURRENT PATHOLOGICAL FRACTURE WITH ROUTINE HEALING: ICD-10-CM

## 2018-10-25 DIAGNOSIS — I70.1 BILATERAL RENAL ARTERY STENOSIS: ICD-10-CM

## 2018-10-25 DIAGNOSIS — T45.1X5A ANEMIA DUE TO ANTINEOPLASTIC CHEMOTHERAPY: Chronic | ICD-10-CM

## 2018-10-25 DIAGNOSIS — G89.29 CHRONIC BILATERAL LOW BACK PAIN WITHOUT SCIATICA: ICD-10-CM

## 2018-10-25 DIAGNOSIS — D64.81 ANEMIA DUE TO ANTINEOPLASTIC CHEMOTHERAPY: Chronic | ICD-10-CM

## 2018-10-25 PROCEDURE — 99213 OFFICE O/P EST LOW 20 MIN: CPT | Mod: PBBFAC,PO | Performed by: INTERNAL MEDICINE

## 2018-10-25 PROCEDURE — 99214 OFFICE O/P EST MOD 30 MIN: CPT | Mod: S$PBB,,, | Performed by: INTERNAL MEDICINE

## 2018-10-25 PROCEDURE — 99999 PR PBB SHADOW E&M-EST. PATIENT-LVL III: CPT | Mod: PBBFAC,,, | Performed by: INTERNAL MEDICINE

## 2018-10-25 RX ORDER — LISINOPRIL 40 MG/1
40 TABLET ORAL DAILY
Qty: 90 TABLET | Refills: 3 | Status: ON HOLD | OUTPATIENT
Start: 2018-10-25 | End: 2019-11-22 | Stop reason: SDUPTHER

## 2018-10-25 RX ORDER — CLONIDINE HYDROCHLORIDE 0.2 MG/1
0.2 TABLET ORAL 2 TIMES DAILY
Qty: 180 TABLET | Refills: 3 | Status: SHIPPED | OUTPATIENT
Start: 2018-10-25 | End: 2019-12-23

## 2018-10-25 RX ORDER — CARVEDILOL 25 MG/1
25 TABLET ORAL 2 TIMES DAILY WITH MEALS
Qty: 180 TABLET | Refills: 3 | Status: ON HOLD | OUTPATIENT
Start: 2018-10-25 | End: 2019-11-22 | Stop reason: HOSPADM

## 2018-10-25 RX ORDER — CALCIUM CITRATE/VITAMIN D3 315MG-6.25
TABLET ORAL ONCE
COMMUNITY
End: 2019-01-17

## 2018-10-25 NOTE — PROGRESS NOTES
Subjective:    Patient ID:  Annette Garcia is a 83 y.o. female who presents for follow-up of Hypertension      HPI     84 y/o female with hx of difficult to control HTN (previously on a diuretic but dicsontinued due to recurrent dehydration), renal artery stenosis (LRA severe ostial disease s/p YOLANDA, RRA with mild-mod disease), anemia, acquired hypothyroidism, diffuse large B-cell lymphoma previously on chemo (last PET showed remission). She presents for f/u HTN and renal artery stenosis.  Last clinic visit BP was uncontrolled and she had been taking clonidine only once daily. Has since been taking BID and BP has improved with SBP mostly in the 140-150's. She is feeling better and denies CP, SOB, orthopnea, PND, syncope. Has ZARAGOZA which is chronic and some fatigue. Has had prior visits admitting to dietary indiscretion with high sodium diet including Nicole's Pie, Corn bisque, and onion rings (previously was V8, canned foods, TV dinners, soft drinks, gumbo, chinese food). Her main complaint is of back pain and was found to have compression fx's and is following with pain management.     Review of Systems   Constitution: Positive for weakness. Negative for malaise/fatigue.   HENT: Negative for congestion.    Eyes: Negative for blurred vision.   Cardiovascular: Positive for dyspnea on exertion. Negative for chest pain, claudication, cyanosis, irregular heartbeat, leg swelling, near-syncope, orthopnea, palpitations, paroxysmal nocturnal dyspnea and syncope.   Respiratory: Negative for shortness of breath.    Endocrine: Negative for polyuria.   Hematologic/Lymphatic: Negative for bleeding problem.   Skin: Negative for itching and rash.   Musculoskeletal: Positive for back pain and joint pain. Negative for joint swelling, muscle cramps and muscle weakness.   Gastrointestinal: Negative for abdominal pain, hematemesis, hematochezia, melena, nausea and vomiting.   Genitourinary: Negative for dysuria and hematuria.    Neurological: Negative for dizziness, focal weakness, headaches, light-headedness and loss of balance.   Psychiatric/Behavioral: Negative for depression. The patient is not nervous/anxious.         Objective:    Physical Exam   Constitutional: She is oriented to person, place, and time. She appears well-developed and well-nourished.   HENT:   Head: Normocephalic and atraumatic.   Neck: Neck supple. No JVD present.   Cardiovascular: Normal rate, regular rhythm and normal heart sounds.   Pulses:       Carotid pulses are 2+ on the right side, and 2+ on the left side.       Radial pulses are 2+ on the right side, and 2+ on the left side.        Femoral pulses are 2+ on the right side, and 2+ on the left side.       Dorsalis pedis pulses are 2+ on the right side, and 2+ on the left side.        Posterior tibial pulses are 2+ on the right side, and 2+ on the left side.   Pulmonary/Chest: Effort normal and breath sounds normal.   Abdominal: Soft. Bowel sounds are normal.   Musculoskeletal: She exhibits no edema.   Neurological: She is alert and oriented to person, place, and time.   Skin: Skin is warm and dry.   Psychiatric: She has a normal mood and affect. Her behavior is normal. Thought content normal.         Assessment:       1. Renovascular hypertension    2. Bilateral renal artery stenosis    3. Closed wedge compression fracture of eleventh thoracic vertebra with routine healing    4. Chronic hyponatremia    5. Thrombocytopenia, unspecified    6. Anemia due to antineoplastic chemotherapy    7. Diffuse large B-cell lymphoma, unspecified body region    8. Acquired hypothyroidism    9. Age-related osteoporosis with current pathological fracture with routine healing    10. Chronic bilateral low back pain without sciatica      84 y/o pt with hx and presentation as above. Doing well from a cardiac perspective and compensated from a HF perspective. BP improved and will continue with current regimen.         Plan:        -Continue current medical management  -f/u in 6 months

## 2018-12-11 ENCOUNTER — OFFICE VISIT (OUTPATIENT)
Dept: PAIN MEDICINE | Facility: CLINIC | Age: 83
End: 2018-12-11
Payer: MEDICARE

## 2018-12-11 VITALS
HEIGHT: 59 IN | HEART RATE: 68 BPM | SYSTOLIC BLOOD PRESSURE: 145 MMHG | WEIGHT: 142.06 LBS | DIASTOLIC BLOOD PRESSURE: 77 MMHG | BODY MASS INDEX: 28.64 KG/M2

## 2018-12-11 DIAGNOSIS — M51.36 DDD (DEGENERATIVE DISC DISEASE), LUMBAR: ICD-10-CM

## 2018-12-11 DIAGNOSIS — S22.080D CLOSED WEDGE COMPRESSION FRACTURE OF ELEVENTH THORACIC VERTEBRA WITH ROUTINE HEALING: Primary | ICD-10-CM

## 2018-12-11 DIAGNOSIS — G89.29 CHRONIC BILATERAL LOW BACK PAIN WITHOUT SCIATICA: ICD-10-CM

## 2018-12-11 DIAGNOSIS — M54.50 CHRONIC BILATERAL LOW BACK PAIN WITHOUT SCIATICA: ICD-10-CM

## 2018-12-11 PROCEDURE — 99214 OFFICE O/P EST MOD 30 MIN: CPT | Mod: S$PBB,,, | Performed by: PAIN MEDICINE

## 2018-12-11 PROCEDURE — 99999 PR PBB SHADOW E&M-EST. PATIENT-LVL III: CPT | Mod: PBBFAC,,, | Performed by: PAIN MEDICINE

## 2018-12-11 PROCEDURE — 99213 OFFICE O/P EST LOW 20 MIN: CPT | Mod: PBBFAC,PO | Performed by: PAIN MEDICINE

## 2018-12-11 NOTE — PROGRESS NOTES
[facility] Established Patient Evaluation    Referred by: Tyson Arredondo MD   Reason for referral: Non-traumatic compression fracture of eleventh thoracic vertebra, initial encounter     CC:   Chief Complaint   Patient presents with    Back Pain    Joint Pain    Hand Pain     Last 3 PDI Scores 12/11/2018 10/5/2018   Pain Disability Index (PDI) 53 42     Interval Update:   12/11/2018 Patient returns today reporting continued 5/10 back pain.  Today's visit was supposed to be an update on her progress with physical therapy; however, she states that she never went due to transportation limitations.  She has started wearing a back brace when performing strenuous activities and limits herself to no more than 30 consecutive minutes of use.  She continues to decline interventional therapy and reports that Tylenol 975 mg TID controls her pain well, but causes her to feel sleepy.  She requests a handout for back exercises that can be performed at home as she does not anticipate improvement in her transportation situation.     Background:  Annette Garcia is a 83 y.o. female referred for back pain.        Location: Mid Back   Severity: Currently: 3/10   Typical Range: 5/10     Exacerbation: 10/10   Onset: 2 months ago while coughing heavily in bed  Quality: Aching  Radiation: no  Axial/Extremity Percentage of Pain: 100/0  Exacerbating Factors: twisting and Some daily activities   Mitigating Factors: rest and sitting  Assoc: denies night fever/night sweats, urinary incontinence, bowel incontinence, significant weight loss, significant motor weakness and loss of sensations    Previous Therapies:  PT: Denies  HEP: Denies  TENS:  Injections: Denies  Surgery: Yes, previously for compression fracture in L-spine  Medications:   - NSAIDS:   - Tylenol: Yes, current  - MSK Relaxants:   - TCAs:   - SNRIs:   - Topicals:   - Anticonvulsants:  - Opioids:     Current Pain Medications:  1. Tylenol 650-975 mg TID PRN    Full  Medication List:    Current Outpatient Medications:     acetaminophen (TYLENOL) 325 MG tablet, Take 2 tablets (650 mg total) by mouth every 4 (four) hours as needed for Pain., Disp: , Rfl: 0    back brace Misc, Lumbar back brace bc of compression fracture, Disp: 1 each, Rfl:     calcium citrate-vitamin D3 (CALCITRATE-VITAMIN D) 315-250 mg-unit Tab, Take by mouth once., Disp: , Rfl:     carvedilol (COREG) 25 MG tablet, Take 1 tablet (25 mg total) by mouth 2 (two) times daily with meals., Disp: 180 tablet, Rfl: 3    cloNIDine (CATAPRES) 0.2 MG tablet, Take 1 tablet (0.2 mg total) by mouth 2 (two) times daily., Disp: 180 tablet, Rfl: 3    denosumab (PROLIA) 60 mg/mL Syrg, Inject 60 mg into the skin every 6 (six) months., Disp: , Rfl:     levothyroxine (SYNTHROID) 100 MCG tablet, TAKE 1 TABLET (100 MCG TOTAL) BY MOUTH ONCE DAILY., Disp: 90 tablet, Rfl: 3    lisinopril (PRINIVIL,ZESTRIL) 40 MG tablet, Take 1 tablet (40 mg total) by mouth once daily., Disp: 90 tablet, Rfl: 3    OXcarbazepine (TRILEPTAL) 300 MG Tab, TAKE 1 TABLET (300 MG TOTAL) BY MOUTH NIGHTLY., Disp: 90 tablet, Rfl: 3    PHENobarbital (LUMINAL) 64.8 MG tablet, TAKE 1 TABLET (64.8 MG TOTAL) BY MOUTH EVERY EVENING., Disp: 90 tablet, Rfl: 1    polyethylene glycol (GLYCOLAX) 17 gram PwPk, Take 17 g by mouth 2 (two) times daily as needed (Constipation)., Disp: , Rfl: 0     Review of Systems:  Review of Systems   Constitutional: Negative for chills and fever.   HENT: Negative for nosebleeds.    Eyes: Negative for pain.   Respiratory: Negative for hemoptysis.    Cardiovascular: Negative for chest pain.   Gastrointestinal: Negative for nausea and vomiting.   Genitourinary: Negative for dysuria.   Skin: Negative for rash.       Allergies:  Adhesive; Penicillins; Tramadol; Avelox [moxifloxacin]; Amoxil [amoxicillin]; Aspridrox [aspirin, buffered]; Codeine; Keflex [cephalexin]; Norvasc [amlodipine]; Red dye; Sulfa (sulfonamide antibiotics); Tylenol  [acetaminophen]; and Vicks vaporub [camphor-eucalyptus oil-menthol]     Medical History:  Past Medical History:   Diagnosis Date    Cancer     colon    Hypertension     Petit mal epilepsy 1954    Scoliosis of lumbar spine     Seizures     Unspecified hypothyroidism         Surgical History:  Past Surgical History:   Procedure Laterality Date    APPENDECTOMY      BACK SURGERY  1988    vertebral fracture    BACK SURGERY  02/2013    lumbar L2-5    BIOPSY-BONE MARROW Right 9/26/2016    [facility]    CATARACT EXTRACTION, BILATERAL Bilateral     CHOLECYSTECTOMY      open    COLON SURGERY      EXPLORATORY-LAPAROTOMY, DILIP, possible bowel resection N/A 8/23/2016    Performed by Lucretia Rosa DO at [facility] OR    EYE SURGERY Bilateral     cataract removal with lens implant    HYSTERECTOMY      TRZALBZVR-CNML-Q-CATH Right 9/21/2016    Per[facility] at [facility]    PORTACATH PLACEMENT Right 09/2016    RESECTION - SMALL BOWEL N/A 8/23/2016    Per[facility] at [facility]    TONSILLECTOMY      VAGINAL HYSTERECTOMY W/ ANTERIOR AND POSTERIOR VAGINAL REPAIR          Social History:  Social History     Socioeconomic History    Marital status:      Spouse name: Not on file    Number of children: Not on file    Years of education: Not on file    Highest education level: Not on file   Social Needs    Financial resource strain: Not on file    Food insecurity - worry: Not on file    Food insecurity - inability: Not on file    Transportation needs - medical: Not on file    Transportation needs - non-medical: Not on file   Occupational History    Not on file   Tobacco Use    Smoking status: Never Smoker    Smokeless tobacco: Never Used   Substance and Sexual Activity    Alcohol use: No    Drug use: No    Sexual activity: Not on file   Other Topics Concern    Not on file   Social History Narrative    Not on file  "      Physical Exam:  Vitals:    18 0936   BP: (!) 145/77   Pulse: 68   Weight: 64.4 kg (142 lb 1.4 oz)   Height: 4' 11" (1.499 m)   PainSc:   5     General    Nursing note and vitals reviewed.  Constitutional: She is oriented to person, place, and time. She appears well-developed and well-nourished. No distress.   HENT:   Head: Normocephalic and atraumatic.   Nose: Nose normal.   Eyes: Conjunctivae and EOM are normal. Pupils are equal, round, and reactive to light. Right eye exhibits no discharge. Left eye exhibits no discharge. No scleral icterus.   Neck: No JVD present.   Cardiovascular: Intact distal pulses.    Pulmonary/Chest: Effort normal. No respiratory distress.   Abdominal: She exhibits no distension.   Neurological: She is alert and oriented to person, place, and time. Coordination normal.   Psychiatric: She has a normal mood and affect. Her behavior is normal. Judgment and thought content normal.     General Musculoskeletal Exam   Gait: normal     Back (L-Spine & T-Spine) / Neck (C-Spine) Exam     Tenderness Posterior midline palpation reveals tenderness of the Upper L-Spine and Upper T-Spine.     Back (L-Spine & T-Spine) Range of Motion   Back extension: facet loading is positive and exacerabtes/reproduces the patient's typical low back pain    Back flexion: limited ROM but partial relief of low back pain noted.     Spinal Sensation   Right Side Sensation  L-Spine Level: normal  Left Side Sensation  L-Spine Level: normal    Other She has no scoliosis .      Muscle Strength   Right Lower Extremity   Hip Flexion: 5/5   Hip Extensors: 5/5  Quadriceps:  5/5   Hamstrin/5   Gastrocsoleus:  5/5/5  Left Lower Extremity   Hip Flexion: 5/5   Hip Extensors: 5/5  Quadriceps:  5/5   Hamstrin/5   Gastrocsoleus:  5/5/5    Reflexes     Left Side  Quadriceps:  2+  Achilles:  2+    Right Side   Quadriceps:  2+  Achilles:  2+      Imaging:  CT abdomen and pelvis with and without contrast 2018  My " review of the images is significant for multilevel degenerative disc disease.  Sagittal images show at least 3 compression fractures throughout the thoracolumbar spine at T11, L2, and L4.  There does not appear to be retropulsion associated with the compression fracture at T11 which was not present on the CT scan from 2018.  Advanced degenerative disc disease is most notable at L2-3, L3-4, L4-5, and L5-S1 with vacuum disc phenomenon noted at these levels.  There is bilateral facet hypertrophy throughout the lumbar spine.    Labs:  BMP  Lab Results   Component Value Date     2018    K 4.2 2018    CL 97 2018    CO2 28 2018    BUN 12 2018    CREATININE 0.61 2018    CALCIUM 9.6 2018    ANIONGAP 11 2018    ESTGFRAFRICA >60.0 2018    EGFRNONAA >60.0 2018     Lab Results   Component Value Date    ALT 12 2018    AST 18 2018    ALKPHOS 139 (H) 2018    BILITOT 0.4 2018       Assessment:  Problem List Items Addressed This Visit     Closed wedge compression fracture of eleventh thoracic vertebra with routine healing - Primary    DDD (degenerative disc disease), lumbar    Chronic bilateral low back pain without sciatica          This is a very pleasant 83-year-old female who presents for evaluation with her daughter-in-law.  Her active medical problems consist of hypertension, epilepsy, scoliosis, and hypothyroidism.  She has a history of diffuse large B-cell lymphoma which appears to have responded well to treatment.  Her history, imaging, and examination support the diagnosis of acute compression fracture at T11 the pain from which has slowly improved over the past 2 months.  She reports pain of 4/10 in clinic today but this represents an exacerbation of pain as she attended a  yesterday.  Typically, her pain has been 2/10 and managed with over-the-counter Tylenol.    Given that her symptoms are improving, I have  recommended a conservative approach for managing her pain beginning with physical therapy and intermittent use of a lumbar back brace for no more than 2 hr per day when engaging in strenuous activities.  Should her pain persist, then we will consider kyphoplasty.  Additionally, I have recommended that she use Tylenol 1000 mg t.i.d. p.r.n. as this medication has worked very well for her pain in the past.    She will return to clinic in 4 the 8 weeks for evaluation after completion of physical therapy.    12/11/18 - Patient states that she does not want interventional therapy (kyphopalsty), pain medications beyond Tylenol, and has no transportation for physical therapy (which has been proven to reduce low back pain following compression fracture).  I attempted to educate the patient and her daughter on the importance of PT for making sure the exercises are performed correctly, but they decline.      Treatment Plan:   PT/OT/HEP: Referral still pending for PT as this remains my primary recommendation.  Handout of back exercises given to patient for strengthening and stretching.  Cont use of back brace no more than 2 hrs per day.  Procedures: None at this time though I cont to recommend kyphopalsty.  Medications: Cont Tylenol 1000 mg TID PRN  Imaging: No additional imaging recommended at this time.  Labs: Reviewed.  Medications are appropriately dosed for current hepatorenal function.    Follow Up: RTC 8 weeks    Chirag Bates Jr, MD  Interventional Pain Medicine / Anesthesiology    Disclaimer: This note was partly generated using dictation software which may occasionally result in transcription errors.

## 2019-01-03 NOTE — TELEPHONE ENCOUNTER
----- Message from Allie Blancas sent at 2/24/2017 12:07 PM CST -----  Contact: Bernadette[facility] /Katheryn [phone]  CPt code needed/Please advise. Cpt CODE 38593 not a good code per medicare   pred 3    Recommend start taking TYLENOL     P.T. FOR BILATERAL I.T. BANDS AND CORE STRENGTHENING.     PLEASE TELL THEM TO GIVE YOU EXERCISES TO DO, WHEN YOU GO TO [place].    WEIGHT LOSS ENCOURAGED    FOLLOW UP WITH DR SANABRIA TO SCHEDULE FOR RT TOTAL HIP    CORE STRENGTHENING FOR BACK     Call Orthopedics at [phone] for any questions or other issues.    Discussed healthy lifestyles to include Weight loss, stopping smoking, decreasing alcohol consumption,exercising as well as good dietary choices

## 2019-01-17 ENCOUNTER — TELEPHONE (OUTPATIENT)
Dept: FAMILY MEDICINE | Facility: CLINIC | Age: 84
End: 2019-01-17

## 2019-01-17 ENCOUNTER — OFFICE VISIT (OUTPATIENT)
Dept: FAMILY MEDICINE | Facility: CLINIC | Age: 84
End: 2019-01-17
Payer: MEDICARE

## 2019-01-17 VITALS
SYSTOLIC BLOOD PRESSURE: 124 MMHG | WEIGHT: 135.5 LBS | TEMPERATURE: 98 F | DIASTOLIC BLOOD PRESSURE: 72 MMHG | OXYGEN SATURATION: 99 % | BODY MASS INDEX: 27.32 KG/M2 | HEART RATE: 76 BPM | HEIGHT: 59 IN

## 2019-01-17 DIAGNOSIS — J30.1 ALLERGIC RHINITIS DUE TO POLLEN, UNSPECIFIED SEASONALITY: Primary | ICD-10-CM

## 2019-01-17 PROCEDURE — 96372 THER/PROPH/DIAG INJ SC/IM: CPT | Mod: S$GLB,,, | Performed by: FAMILY MEDICINE

## 2019-01-17 PROCEDURE — 99213 PR OFFICE/OUTPT VISIT, EST, LEVL III, 20-29 MIN: ICD-10-PCS | Mod: 25,S$GLB,, | Performed by: FAMILY MEDICINE

## 2019-01-17 PROCEDURE — 96372 PR INJECTION,THERAP/PROPH/DIAG2ST, IM OR SUBCUT: ICD-10-PCS | Mod: S$GLB,,, | Performed by: FAMILY MEDICINE

## 2019-01-17 PROCEDURE — 99213 OFFICE O/P EST LOW 20 MIN: CPT | Mod: 25,S$GLB,, | Performed by: FAMILY MEDICINE

## 2019-01-17 RX ORDER — BETAMETHASONE SODIUM PHOSPHATE AND BETAMETHASONE ACETATE 3; 3 MG/ML; MG/ML
12 INJECTION, SUSPENSION INTRA-ARTICULAR; INTRALESIONAL; INTRAMUSCULAR; SOFT TISSUE
Status: COMPLETED | OUTPATIENT
Start: 2019-01-17 | End: 2019-01-17

## 2019-01-17 RX ORDER — OXYBUTYNIN CHLORIDE 5 MG/1
TABLET ORAL
Qty: 45 TABLET | Refills: 11 | Status: ON HOLD | OUTPATIENT
Start: 2019-01-17 | End: 2019-11-22 | Stop reason: HOSPADM

## 2019-01-17 RX ORDER — PREDNISONE 10 MG/1
TABLET ORAL
Qty: 5 TABLET | Refills: 0 | Status: SHIPPED | OUTPATIENT
Start: 2019-01-17 | End: 2019-06-20

## 2019-01-17 RX ADMIN — BETAMETHASONE SODIUM PHOSPHATE AND BETAMETHASONE ACETATE 12 MG: 3; 3 INJECTION, SUSPENSION INTRA-ARTICULAR; INTRALESIONAL; INTRAMUSCULAR; SOFT TISSUE at 12:01

## 2019-01-17 NOTE — TELEPHONE ENCOUNTER
----- Message from Kath Young sent at 1/17/2019  8:03 AM CST -----  No. [phone]  Patient has a cold with a cough, congestion, and sore throat.  Patient only wants to see Dr. Torres today.

## 2019-01-20 NOTE — PROGRESS NOTES
Patient ID: Annette Garcia is a 83 y.o. female.    Chief Complaint: Cough, sore throat, hoarse, nasal congestion    HPI    Annette Garcia is a 83 y.o. female.  with several day hx of mild to moderate nasal congestion, post nasal drip and non productive cough.  Over the counter meds have not resolved symptoms.    No fever chills nausea vomiting or diarrhea.  Complains of increased urinary frequency-bladder irritation.  Does not go to visit people because of frequent bathroom visit.        Review of Symptoms    Constitutional  No change in activity, No chills/ fever   Resp  Neg hemoptysis, stridor, choking  CVS  Neg chest pain, palpitations    Physical Exam    Constitutional:   Oriented to person, place, and time.appears well-developed and well-nourished.   No distress.     HENT:   Head: Normocephalic and atraumatic.     Right Ear: Tympanic membrane, ear canal and External ear normal      Left Ear: Tympanic membrane, ear canal and External ear normal     Nose: External Normal. Normal turbinates, No rhinorrhea (Unless checked)  [x] Edematous Turbinates   NO Purulent Discharge  NO Evidence of Bleeding   [x] Clear Discharge    Mouth/Throat: Uvula is midline, oropharynx is clear and moist and mucous membranes are normal.     Neck: supple no anterior cervical adenopathy    Eyes:   Conjunctivae are normal. Right eye exhibits no discharge. Left eye exhibits no discharge. No scleral icterus. No periorbital edema     Cardiovascular:  Regular rate and rhythm with normal S1 and S2     Pulmonary/Chest:   Clear to auscultation bilaterally without wheezes, rhonchi or rales    Musculoskeletal:   No edema. No obvious deformity No wasting   Neurological:   Alert and oriented to person, place, and time. Coordination normal.     Skin:   Skin is warm and dry. No rash noted.  No diaphoresis.     Psychiatric: Normal mood and affect. Behavior is normal. Judgment and thought content normal.       Assessment / Plan:      ICD-10-CM  ICD-9-CM   1. Allergic rhinitis due to pollen, unspecified seasonality J30.1 477.0     Allergic rhinitis due to pollen, unspecified seasonality    Other orders  -     betamethasone acetate-betamethasone sodium phosphate injection 12 mg  -     predniSONE (DELTASONE) 10 MG tablet; One daily for 5 days  Dispense: 5 tablet; Refill: 0  -     oxybutynin (DITROPAN) 5 MG Tab; One po every 6 hours as need for bladder irritation  Dispense: 45 tablet; Refill: 11    Trial of oxybutynin when outside of house-discussed some side effects of this medication.

## 2019-02-08 NOTE — PROGRESS NOTES
[facility] Established Patient Evaluation    Referred by: Tyson Arredondo MD   Reason for referral: Non-traumatic compression fracture of eleventh thoracic vertebra, initial encounter     CC:   Chief Complaint   Patient presents with    Back Pain    Low-back Pain     Last 3 PDI Scores 2/11/2019 12/11/2018 10/5/2018   Pain Disability Index (PDI) 20 [phone]     Interval Update:   12/11/2018 Patient returns today reporting continued 5/10 back pain.  Today's visit was supposed to be an update on her progress with physical therapy; however, she states that she never went due to transportation limitations.  She has started wearing a back brace when performing strenuous activities and limits herself to no more than 30 consecutive minutes of use.  She continues to decline interventional therapy and reports that Tylenol 975 mg TID controls her pain well, but causes her to feel sleepy.  She requests a handout for back exercises that can be performed at home as she does not anticipate improvement in her transportation situation.     Background:  Annette Garcia is a 83 y.o. female referred for back pain.        Location: Mid Back   Severity: Currently: 3/10   Typical Range: 5/10     Exacerbation: 10/10   Onset: 2 months ago while coughing heavily in bed  Quality: Aching  Radiation: no  Axial/Extremity Percentage of Pain: 100/0  Exacerbating Factors: twisting and Some daily activities   Mitigating Factors: rest and sitting  Assoc: denies night fever/night sweats, urinary incontinence, bowel incontinence, significant weight loss, significant motor weakness and loss of sensations    Previous Therapies:  PT: Denies  HEP: Denies  TENS:  Injections: Denies  Surgery: Yes, previously for compression fracture in L-spine  Medications:   - NSAIDS:   - Tylenol: Yes, current  - MSK Relaxants:   - TCAs:   - SNRIs:   - Topicals:   - Anticonvulsants:  - Opioids:     Current Pain Medications:  1. Tylenol 650-975 mg TID  PRN    Full Medication List:    Current Outpatient Medications:     acetaminophen (TYLENOL) 325 MG tablet, Take 2 tablets (650 mg total) by mouth every 4 (four) hours as needed for Pain., Disp: , Rfl: 0    back brace Misc, Lumbar back brace bc of compression fracture, Disp: 1 each, Rfl:     carvedilol (COREG) 25 MG tablet, Take 1 tablet (25 mg total) by mouth 2 (two) times daily with meals., Disp: 180 tablet, Rfl: 3    cloNIDine (CATAPRES) 0.2 MG tablet, Take 1 tablet (0.2 mg total) by mouth 2 (two) times daily., Disp: 180 tablet, Rfl: 3    denosumab (PROLIA) 60 mg/mL Syrg, Inject 60 mg into the skin every 6 (six) months., Disp: , Rfl:     levothyroxine (SYNTHROID) 100 MCG tablet, TAKE 1 TABLET (100 MCG TOTAL) BY MOUTH ONCE DAILY., Disp: 90 tablet, Rfl: 3    lisinopril (PRINIVIL,ZESTRIL) 40 MG tablet, Take 1 tablet (40 mg total) by mouth once daily., Disp: 90 tablet, Rfl: 3    OXcarbazepine (TRILEPTAL) 300 MG Tab, TAKE 1 TABLET (300 MG TOTAL) BY MOUTH NIGHTLY., Disp: 90 tablet, Rfl: 3    oxybutynin (DITROPAN) 5 MG Tab, One po every 6 hours as need for bladder irritation, Disp: 45 tablet, Rfl: 11    PHENobarbital (LUMINAL) 64.8 MG tablet, TAKE 1 TABLET (64.8 MG TOTAL) BY MOUTH EVERY EVENING., Disp: 90 tablet, Rfl: 1    polyethylene glycol (GLYCOLAX) 17 gram PwPk, Take 17 g by mouth 2 (two) times daily as needed (Constipation)., Disp: , Rfl: 0    predniSONE (DELTASONE) 10 MG tablet, One daily for 5 days, Disp: 5 tablet, Rfl: 0     Review of Systems:  Review of Systems   Constitutional: Negative for chills and fever.   HENT: Negative for nosebleeds.    Eyes: Negative for pain.   Respiratory: Negative for hemoptysis.    Cardiovascular: Negative for chest pain.   Gastrointestinal: Negative for nausea and vomiting.   Genitourinary: Negative for dysuria.   Skin: Negative for rash.       Allergies:  Adhesive; Penicillins; Tramadol; Avelox [moxifloxacin]; Amoxil [amoxicillin]; Aspridrox [aspirin, buffered];  Codeine; Keflex [cephalexin]; Norvasc [amlodipine]; Red dye; Sulfa (sulfonamide antibiotics); Tylenol [acetaminophen]; and Vicks vaporub [camphor-eucalyptus oil-menthol]     Medical History:  Past Medical History:   Diagnosis Date    Cancer     colon    Hypertension     Petit mal epilepsy 1954    Scoliosis of lumbar spine     Seizures     Unspecified hypothyroidism         Surgical History:  Past Surgical History:   Procedure Laterality Date    APPENDECTOMY      BACK SURGERY  1988    vertebral fracture    BACK SURGERY  02/2013    lumbar L2-5    BIOPSY-BONE MARROW Right 9/26/2016    [facility]    CATARACT EXTRACTION, BILATERAL Bilateral     CHOLECYSTECTOMY      open    COLON SURGERY      EXPLORATORY-LAPAROTOMY, DILIP, possible bowel resection N/A 8/23/2016    Performed by Lucretia Rosa DO at [facility] OR    EYE SURGERY Bilateral     cataract removal with lens implant    HYSTERECTOMY      JLIWXWEPR-KYDJ-Z-CATH Right 9/21/2016    Per[facility] at [facility]    PORTACATH PLACEMENT Right 09/2016    RESECTION - SMALL BOWEL N/A 8/23/2016    Per[facility] by [facility] at [facility] OR    TONSILLECTOMY      VAGINAL HYSTERECTOMY W/ ANTERIOR AND POSTERIOR VAGINAL REPAIR          Social History:  Social History     Socioeconomic History    Marital status:      Spouse name: Not on file    Number of children: Not on file    Years of education: Not on file    Highest education level: Not on file   Social Needs    Financial resource strain: Not on file    Food insecurity - worry: Not on file    Food insecurity - inability: Not on file    Transportation needs - medical: Not on file    Transportation needs - non-medical: Not on file   Occupational History    Not on file   Tobacco Use    Smoking status: Never Smoker    Smokeless tobacco: Never Used   Substance and Sexual Activity    Alcohol use: No    Drug use: No    Sexual activity: Not  on file   Other Topics Concern    Not on file   Social History Narrative    Not on file       Physical Exam:  Vitals:    19 1007   BP: (!) 199/74   Weight: 61.7 kg (136 lb)   PainSc:   2     General    Nursing note and vitals reviewed.  Constitutional: She is oriented to person, place, and time. She appears well-developed and well-nourished. No distress.   HENT:   Head: Normocephalic and atraumatic.   Nose: Nose normal.   Eyes: Conjunctivae and EOM are normal. Pupils are equal, round, and reactive to light. Right eye exhibits no discharge. Left eye exhibits no discharge. No scleral icterus.   Neck: No JVD present.   Cardiovascular: Intact distal pulses.    Pulmonary/Chest: Effort normal. No respiratory distress.   Abdominal: She exhibits no distension.   Neurological: She is alert and oriented to person, place, and time. Coordination normal.   Psychiatric: She has a normal mood and affect. Her behavior is normal. Judgment and thought content normal.     General Musculoskeletal Exam   Gait: normal     Back (L-Spine & T-Spine) / Neck (C-Spine) Exam     Tenderness Posterior midline palpation reveals tenderness of the Upper L-Spine and Upper T-Spine.     Back (L-Spine & T-Spine) Range of Motion   Back extension: facet loading is positive and exacerabtes/reproduces the patient's typical low back pain    Back flexion: limited ROM but partial relief of low back pain noted.     Spinal Sensation   Right Side Sensation  L-Spine Level: normal  Left Side Sensation  L-Spine Level: normal    Other She has no scoliosis .      Muscle Strength   Right Lower Extremity   Hip Flexion: 5/5   Hip Extensors: 5/5  Quadriceps:  5/5   Hamstrin/5   Gastrocsoleus:  5/5/5  Left Lower Extremity   Hip Flexion: 5/5   Hip Extensors: 5/5  Quadriceps:  5/5   Hamstrin/5   Gastrocsoleus:  5/5/5    Reflexes     Left Side  Quadriceps:  2+  Achilles:  2+    Right Side   Quadriceps:  2+  Achilles:  2+      Imaging:  CT abdomen and pelvis  with and without contrast 2018  My review of the images is significant for multilevel degenerative disc disease.  Sagittal images show at least 3 compression fractures throughout the thoracolumbar spine at T11, L2, and L4.  There does not appear to be retropulsion associated with the compression fracture at T11 which was not present on the CT scan from 2018.  Advanced degenerative disc disease is most notable at L2-3, L3-4, L4-5, and L5-S1 with vacuum disc phenomenon noted at these levels.  There is bilateral facet hypertrophy throughout the lumbar spine.    Labs:  BMP  Lab Results   Component Value Date     2018    K 4.2 2018    CL 97 2018    CO2 28 2018    BUN 12 2018    CREATININE 0.61 2018    CALCIUM 9.6 2018    ANIONGAP 11 2018    ESTGFRAFRICA >60.0 2018    EGFRNONAA >60.0 2018     Lab Results   Component Value Date    ALT 12 2018    AST 18 2018    ALKPHOS 139 (H) 2018    BILITOT 0.4 2018       Assessment:  Problem List Items Addressed This Visit     None          This is a very pleasant 83-year-old female who presents for evaluation with her daughter-in-law.  Her active medical problems consist of hypertension, epilepsy, scoliosis, and hypothyroidism.  She has a history of diffuse large B-cell lymphoma which appears to have responded well to treatment.  Her history, imaging, and examination support the diagnosis of acute compression fracture at T11 the pain from which has slowly improved over the past 2 months.  She reports pain of 4/10 in clinic today but this represents an exacerbation of pain as she attended a  yesterday.  Typically, her pain has been 2/10 and managed with over-the-counter Tylenol.    Given that her symptoms are improving, I have recommended a conservative approach for managing her pain beginning with physical therapy and intermittent use of a lumbar back brace for no more than 2  hr per day when engaging in strenuous activities.  Should her pain persist, then we will consider kyphoplasty.  Additionally, I have recommended that she use Tylenol 1000 mg t.i.d. p.r.n. as this medication has worked very well for her pain in the past.    She will return to clinic in 4 the 8 weeks for evaluation after completion of physical therapy.    12/11/18 - Patient states that she does not want interventional therapy (kyphopalsty), pain medications beyond Tylenol, and has no transportation for physical therapy (which has been proven to reduce low back pain following compression fracture).  I attempted to educate the patient and her daughter on the importance of PT for making sure the exercises are performed correctly, but they decline.      2/11/19 - I spent 44 minutes with this patient, more than half of which was focused on discussing and counseling her on the importance of HE for her low back and shoulder pains.  She is improving but I still recommend PT, which she declines due to transportation limitations.    Treatment Plan:   PT/OT/HEP:    - Referral still pending for PT as this remains my primary recommendation.  Handout of back exercises given to patient for strengthening and stretching.  Cont use of back brace no more than 2 hrs per day.   - I encouraged the patient to start a home exercise regimen that includes daily, moderate cardiovascular exercise lasting at least 30 minutes.  This may include yoga, micah chi, walking, swimming, aqua aerobics, or other exercises that maintain a heart rate of 50-70% of the calculated maximum heart rate.  I also encouraged light, daily stretching focused on the target area.  Procedures: None at this time though I cont to recommend kyphopalsty.  Medications: Cont Tylenol 1000 mg TID PRN  Imaging: No additional imaging recommended at this time.  Labs: Reviewed.  Medications are appropriately dosed for current hepatorenal function.    Follow Up: RTC as needed    Chirag  Alex Bates Jr, MD  Interventional Pain Medicine / Anesthesiology    Disclaimer: This note was partly generated using dictation software which may occasionally result in transcription

## 2019-02-11 ENCOUNTER — OFFICE VISIT (OUTPATIENT)
Dept: PAIN MEDICINE | Facility: CLINIC | Age: 84
End: 2019-02-11
Payer: MEDICARE

## 2019-02-11 VITALS — SYSTOLIC BLOOD PRESSURE: 199 MMHG | WEIGHT: 136 LBS | BODY MASS INDEX: 27.47 KG/M2 | DIASTOLIC BLOOD PRESSURE: 74 MMHG

## 2019-02-11 DIAGNOSIS — M47.816 OSTEOARTHRITIS OF LUMBAR SPINE, UNSPECIFIED SPINAL OSTEOARTHRITIS COMPLICATION STATUS: Chronic | ICD-10-CM

## 2019-02-11 DIAGNOSIS — M51.36 DDD (DEGENERATIVE DISC DISEASE), LUMBAR: ICD-10-CM

## 2019-02-11 DIAGNOSIS — M54.50 CHRONIC BILATERAL LOW BACK PAIN WITHOUT SCIATICA: Primary | ICD-10-CM

## 2019-02-11 DIAGNOSIS — G89.29 CHRONIC BILATERAL LOW BACK PAIN WITHOUT SCIATICA: Primary | ICD-10-CM

## 2019-02-11 DIAGNOSIS — S22.080D CLOSED WEDGE COMPRESSION FRACTURE OF ELEVENTH THORACIC VERTEBRA WITH ROUTINE HEALING: ICD-10-CM

## 2019-02-11 PROCEDURE — 99999 PR PBB SHADOW E&M-EST. PATIENT-LVL II: ICD-10-PCS | Mod: PBBFAC,,, | Performed by: PAIN MEDICINE

## 2019-02-11 PROCEDURE — 99212 OFFICE O/P EST SF 10 MIN: CPT | Mod: PBBFAC,PO | Performed by: PAIN MEDICINE

## 2019-02-11 PROCEDURE — 99215 OFFICE O/P EST HI 40 MIN: CPT | Mod: S$PBB,,, | Performed by: PAIN MEDICINE

## 2019-02-11 PROCEDURE — 99215 PR OFFICE/OUTPT VISIT, EST, LEVL V, 40-54 MIN: ICD-10-PCS | Mod: S$PBB,,, | Performed by: PAIN MEDICINE

## 2019-02-11 PROCEDURE — 99999 PR PBB SHADOW E&M-EST. PATIENT-LVL II: CPT | Mod: PBBFAC,,, | Performed by: PAIN MEDICINE

## 2019-02-25 ENCOUNTER — LAB VISIT (OUTPATIENT)
Dept: LAB | Facility: HOSPITAL | Age: 84
End: 2019-02-25
Attending: INTERNAL MEDICINE
Payer: MEDICARE

## 2019-02-25 DIAGNOSIS — C83.30 DIFFUSE LARGE B-CELL LYMPHOMA, UNSPECIFIED BODY REGION: Chronic | ICD-10-CM

## 2019-02-25 DIAGNOSIS — M81.0 AGE RELATED OSTEOPOROSIS, UNSPECIFIED PATHOLOGICAL FRACTURE PRESENCE: ICD-10-CM

## 2019-02-25 LAB
25(OH)D3+25(OH)D2 SERPL-MCNC: 16 NG/ML
ALBUMIN SERPL BCP-MCNC: 4.3 G/DL
ALP SERPL-CCNC: 68 U/L
ALT SERPL W/O P-5'-P-CCNC: 13 U/L
ANION GAP SERPL CALC-SCNC: 8 MMOL/L
AST SERPL-CCNC: 18 U/L
BASOPHILS # BLD AUTO: 0.03 K/UL
BASOPHILS NFR BLD: 0.5 %
BILIRUB SERPL-MCNC: 0.3 MG/DL
BUN SERPL-MCNC: 14 MG/DL
CALCIUM SERPL-MCNC: 8.8 MG/DL
CHLORIDE SERPL-SCNC: 95 MMOL/L
CO2 SERPL-SCNC: 28 MMOL/L
CREAT SERPL-MCNC: 0.62 MG/DL
DIFFERENTIAL METHOD: ABNORMAL
EOSINOPHIL # BLD AUTO: 0.1 K/UL
EOSINOPHIL NFR BLD: 2.2 %
ERYTHROCYTE [DISTWIDTH] IN BLOOD BY AUTOMATED COUNT: 12.8 %
EST. GFR  (AFRICAN AMERICAN): >60 ML/MIN/1.73 M^2
EST. GFR  (NON AFRICAN AMERICAN): >60 ML/MIN/1.73 M^2
GLUCOSE SERPL-MCNC: 113 MG/DL
HCT VFR BLD AUTO: 34.7 %
HGB BLD-MCNC: 11.5 G/DL
LYMPHOCYTES # BLD AUTO: 1.4 K/UL
LYMPHOCYTES NFR BLD: 22.9 %
MCH RBC QN AUTO: 33.9 PG
MCHC RBC AUTO-ENTMCNC: 33.1 G/DL
MCV RBC AUTO: 102 FL
MONOCYTES # BLD AUTO: 0.6 K/UL
MONOCYTES NFR BLD: 9.6 %
NEUTROPHILS # BLD AUTO: 3.9 K/UL
NEUTROPHILS NFR BLD: 64.6 %
PLATELET # BLD AUTO: 136 K/UL
PMV BLD AUTO: 10.3 FL
POTASSIUM SERPL-SCNC: 4.1 MMOL/L
PROT SERPL-MCNC: 6.9 G/DL
RBC # BLD AUTO: 3.39 M/UL
SODIUM SERPL-SCNC: 131 MMOL/L
WBC # BLD AUTO: 6.02 K/UL

## 2019-02-25 PROCEDURE — 85025 COMPLETE CBC W/AUTO DIFF WBC: CPT | Mod: PO

## 2019-02-25 PROCEDURE — 80053 COMPREHEN METABOLIC PANEL: CPT | Mod: PO

## 2019-02-25 PROCEDURE — 36415 COLL VENOUS BLD VENIPUNCTURE: CPT | Mod: PO

## 2019-02-25 PROCEDURE — 82306 VITAMIN D 25 HYDROXY: CPT | Mod: PO

## 2019-02-26 ENCOUNTER — OFFICE VISIT (OUTPATIENT)
Dept: HEMATOLOGY/ONCOLOGY | Facility: CLINIC | Age: 84
End: 2019-02-26
Payer: MEDICARE

## 2019-02-26 ENCOUNTER — INFUSION (OUTPATIENT)
Dept: INFUSION THERAPY | Facility: HOSPITAL | Age: 84
End: 2019-02-26
Attending: INTERNAL MEDICINE
Payer: MEDICARE

## 2019-02-26 VITALS
RESPIRATION RATE: 16 BRPM | OXYGEN SATURATION: 99 % | WEIGHT: 134.69 LBS | DIASTOLIC BLOOD PRESSURE: 95 MMHG | HEART RATE: 67 BPM | TEMPERATURE: 98 F | SYSTOLIC BLOOD PRESSURE: 233 MMHG | BODY MASS INDEX: 27.21 KG/M2

## 2019-02-26 DIAGNOSIS — D64.81 ANEMIA DUE TO ANTINEOPLASTIC CHEMOTHERAPY: Primary | ICD-10-CM

## 2019-02-26 DIAGNOSIS — M81.0 AGE-RELATED OSTEOPOROSIS WITHOUT CURRENT PATHOLOGICAL FRACTURE: ICD-10-CM

## 2019-02-26 DIAGNOSIS — C83.30 DIFFUSE LARGE B-CELL LYMPHOMA, UNSPECIFIED BODY REGION: Primary | ICD-10-CM

## 2019-02-26 DIAGNOSIS — M94.9 DISORDER OF CARTILAGE: ICD-10-CM

## 2019-02-26 DIAGNOSIS — D69.6 THROMBOCYTOPENIA, UNSPECIFIED: ICD-10-CM

## 2019-02-26 DIAGNOSIS — T45.1X5A ANEMIA DUE TO ANTINEOPLASTIC CHEMOTHERAPY: Primary | ICD-10-CM

## 2019-02-26 DIAGNOSIS — E55.9 VITAMIN D DEFICIENCY: ICD-10-CM

## 2019-02-26 DIAGNOSIS — M46.1 DEGENERATIVE JOINT DISEASE OF SACROILIAC JOINT: Chronic | ICD-10-CM

## 2019-02-26 DIAGNOSIS — M81.0 AGE RELATED OSTEOPOROSIS, UNSPECIFIED PATHOLOGICAL FRACTURE PRESENCE: ICD-10-CM

## 2019-02-26 PROCEDURE — 63600175 PHARM REV CODE 636 W HCPCS: Performed by: INTERNAL MEDICINE

## 2019-02-26 PROCEDURE — 99213 OFFICE O/P EST LOW 20 MIN: CPT | Mod: PBBFAC,25,PO | Performed by: INTERNAL MEDICINE

## 2019-02-26 PROCEDURE — 99214 OFFICE O/P EST MOD 30 MIN: CPT | Mod: S$PBB,,, | Performed by: INTERNAL MEDICINE

## 2019-02-26 PROCEDURE — 99999 PR PBB SHADOW E&M-EST. PATIENT-LVL III: CPT | Mod: PBBFAC,,, | Performed by: INTERNAL MEDICINE

## 2019-02-26 PROCEDURE — 25000003 PHARM REV CODE 250: Performed by: INTERNAL MEDICINE

## 2019-02-26 PROCEDURE — 96523 IRRIG DRUG DELIVERY DEVICE: CPT

## 2019-02-26 PROCEDURE — A4216 STERILE WATER/SALINE, 10 ML: HCPCS | Performed by: INTERNAL MEDICINE

## 2019-02-26 PROCEDURE — 99214 PR OFFICE/OUTPT VISIT, EST, LEVL IV, 30-39 MIN: ICD-10-PCS | Mod: S$PBB,,, | Performed by: INTERNAL MEDICINE

## 2019-02-26 PROCEDURE — 99999 PR PBB SHADOW E&M-EST. PATIENT-LVL III: ICD-10-PCS | Mod: PBBFAC,,, | Performed by: INTERNAL MEDICINE

## 2019-02-26 RX ORDER — HEPARIN 100 UNIT/ML
500 SYRINGE INTRAVENOUS
Status: COMPLETED | OUTPATIENT
Start: 2019-02-26 | End: 2019-02-26

## 2019-02-26 RX ORDER — SODIUM CHLORIDE 0.9 % (FLUSH) 0.9 %
10 SYRINGE (ML) INJECTION
Status: COMPLETED | OUTPATIENT
Start: 2019-02-26 | End: 2019-02-26

## 2019-02-26 RX ADMIN — HEPARIN SODIUM (PORCINE) LOCK FLUSH IV SOLN 100 UNIT/ML 500 UNITS: 100 SOLUTION at 02:02

## 2019-02-26 RX ADMIN — SODIUM CHLORIDE, PRESERVATIVE FREE 10 ML: 5 INJECTION INTRAVENOUS at 02:02

## 2019-02-26 NOTE — NURSING
PAC flushed with NS and Heparin per protocol and de accessed prior to discharge. Discharged with daughter in NAD.

## 2019-02-26 NOTE — PROGRESS NOTES
Subjective:       Patient ID: Annette Garcia is a 83 y.o. female.    Chief Complaint: DLBCL    HPI She is here for follow-up of Diffuse large B-cell lymphoma.  She was having abdominal discomfort for over 4 months and a CT scan done on 8/15/16 showed dilated loops of small bowel. She underwent explorative laparotomy (8/23) with resection of a segment of her small bowel by  - 2 different areas of strictures was noticed intraoperatively. Pathology revealed diffuse large B-cell lymphoma - germinal center origin (CD10 negative, BCL6 positive, MUM1 negative). Ki-67 was elevated at 75%. PET scan showed stage III disease. No lymphoma on bone marrow biopsy. Got 6 cycles of R-CHOP chemotherapy. Completed in Jan 2017.    PET scan (12/20/16) - Complete resolution of the hypermetabolic abnormality.     She was admitted to the hospital on 1/26 with pancytopenia, dehydration and symptomatic anemia and received blood.      She fell at home in march 2017 and fractured her humerus. Sees .    Her  passed in jan 2018    Here for f/u. Has some new back pain. Saw pain Mx - getting PT    Has osteoporosis - Started PROLIA Oct 2018    Review of Systems   Constitutional: Negative for appetite change, fatigue, fever and unexpected weight change.   HENT: Negative for facial swelling and nosebleeds.    Eyes: Negative for photophobia and pain.   Respiratory: Negative for cough and shortness of breath.    Cardiovascular: Negative for chest pain and leg swelling.   Gastrointestinal: Negative for abdominal pain, blood in stool and nausea.   Genitourinary: Negative for dysuria and hematuria.   Skin: Negative for color change and rash.   Neurological: Negative for seizures, weakness and headaches.   Hematological: Negative for adenopathy. Does not bruise/bleed easily.       Objective:      Physical Exam   Constitutional: She is oriented to person, place, and time. No distress.   HENT:   Mouth/Throat: No oropharyngeal  exudate.   Eyes: No scleral icterus.   Neck: Neck supple.   Cardiovascular: Normal rate. Exam reveals no gallop.   Pulmonary/Chest: Effort normal. She has no wheezes. She has no rales.   Abdominal: Soft. There is no tenderness.   Musculoskeletal: She exhibits no edema.   Lymphadenopathy:     She has no cervical adenopathy.   Neurological: She is alert and oriented to person, place, and time.   Skin: She is not diaphoretic.       Assessment:     1. Diffuse large B-cell lymphoma, unspecified body region    2. Disorder of cartilage     3. Thrombocytopenia, unspecified    4. Age related osteoporosis, unspecified pathological fracture presence          Plan:   She completed R-CHOP in Jan 2017 for Stage III DLBCL.    Labs reviewed.  Hb and Thrombocytopenia stable    Imaging showed Interval development of a moderate compression fracture T11. No evidence of lymphoma recurrence. F/u with pain Mx.    She has osteoporosis bilateral hips.  No teeth issues.  Prolia started Oct 2018, Continue every 6 mo.    Restart Caltrate with D and Vit D for Low Vit D level    She is not interested in port removal, will flush it every 4-6 months    F/u with me in 6 mo

## 2019-03-25 RX ORDER — LEVOTHYROXINE SODIUM 100 UG/1
TABLET ORAL
Qty: 90 TABLET | Refills: 3 | Status: SHIPPED | OUTPATIENT
Start: 2019-03-25 | End: 2019-06-24 | Stop reason: SDUPTHER

## 2019-04-11 ENCOUNTER — LAB VISIT (OUTPATIENT)
Dept: LAB | Facility: HOSPITAL | Age: 84
End: 2019-04-11
Attending: INTERNAL MEDICINE
Payer: MEDICARE

## 2019-04-11 DIAGNOSIS — C83.30 DIFFUSE LARGE B-CELL LYMPHOMA, UNSPECIFIED BODY REGION: Chronic | ICD-10-CM

## 2019-04-11 LAB
ALBUMIN SERPL BCP-MCNC: 4.7 G/DL (ref 3.5–5.2)
ALP SERPL-CCNC: 65 U/L (ref 38–126)
ALT SERPL W/O P-5'-P-CCNC: 14 U/L (ref 10–44)
ANION GAP SERPL CALC-SCNC: 10 MMOL/L (ref 8–16)
AST SERPL-CCNC: 22 U/L (ref 15–46)
BASOPHILS # BLD AUTO: 0.02 K/UL (ref 0–0.2)
BASOPHILS NFR BLD: 0.4 % (ref 0–1.9)
BILIRUB SERPL-MCNC: 0.4 MG/DL (ref 0.1–1)
BUN SERPL-MCNC: 11 MG/DL (ref 7–17)
CALCIUM SERPL-MCNC: 9.8 MG/DL (ref 8.7–10.5)
CHLORIDE SERPL-SCNC: 91 MMOL/L (ref 95–110)
CO2 SERPL-SCNC: 28 MMOL/L (ref 23–29)
CREAT SERPL-MCNC: 0.51 MG/DL (ref 0.5–1.4)
DIFFERENTIAL METHOD: ABNORMAL
EOSINOPHIL # BLD AUTO: 0.2 K/UL (ref 0–0.5)
EOSINOPHIL NFR BLD: 3 % (ref 0–8)
ERYTHROCYTE [DISTWIDTH] IN BLOOD BY AUTOMATED COUNT: 12.2 % (ref 11.5–14.5)
EST. GFR  (AFRICAN AMERICAN): >60 ML/MIN/1.73 M^2
EST. GFR  (NON AFRICAN AMERICAN): >60 ML/MIN/1.73 M^2
GLUCOSE SERPL-MCNC: 102 MG/DL (ref 70–110)
HCT VFR BLD AUTO: 36.2 % (ref 37–48.5)
HGB BLD-MCNC: 12.4 G/DL (ref 12–16)
LYMPHOCYTES # BLD AUTO: 1.4 K/UL (ref 1–4.8)
LYMPHOCYTES NFR BLD: 27.3 % (ref 18–48)
MCH RBC QN AUTO: 34.3 PG (ref 27–31)
MCHC RBC AUTO-ENTMCNC: 34.3 G/DL (ref 32–36)
MCV RBC AUTO: 100 FL (ref 82–98)
MONOCYTES # BLD AUTO: 0.5 K/UL (ref 0.3–1)
MONOCYTES NFR BLD: 9.5 % (ref 4–15)
NEUTROPHILS # BLD AUTO: 3 K/UL (ref 1.8–7.7)
NEUTROPHILS NFR BLD: 59.6 % (ref 38–73)
PLATELET # BLD AUTO: 126 K/UL (ref 150–350)
PMV BLD AUTO: 10.3 FL (ref 9.2–12.9)
POTASSIUM SERPL-SCNC: 3.8 MMOL/L (ref 3.5–5.1)
PROT SERPL-MCNC: 7.6 G/DL (ref 6–8.4)
RBC # BLD AUTO: 3.62 M/UL (ref 4–5.4)
SODIUM SERPL-SCNC: 129 MMOL/L (ref 136–145)
WBC # BLD AUTO: 5.05 K/UL (ref 3.9–12.7)

## 2019-04-11 PROCEDURE — 85025 COMPLETE CBC W/AUTO DIFF WBC: CPT | Mod: PO

## 2019-04-11 PROCEDURE — 36415 COLL VENOUS BLD VENIPUNCTURE: CPT | Mod: PO

## 2019-04-11 PROCEDURE — 80053 COMPREHEN METABOLIC PANEL: CPT | Mod: PO

## 2019-04-12 ENCOUNTER — INFUSION (OUTPATIENT)
Dept: INFUSION THERAPY | Facility: HOSPITAL | Age: 84
End: 2019-04-12
Attending: INTERNAL MEDICINE
Payer: MEDICARE

## 2019-04-12 VITALS — BODY MASS INDEX: 27.15 KG/M2 | WEIGHT: 134.69 LBS | HEIGHT: 59 IN

## 2019-04-12 DIAGNOSIS — M80.00XD AGE-RELATED OSTEOPOROSIS WITH CURRENT PATHOLOGICAL FRACTURE WITH ROUTINE HEALING: Primary | ICD-10-CM

## 2019-04-12 PROCEDURE — 96372 THER/PROPH/DIAG INJ SC/IM: CPT

## 2019-04-12 PROCEDURE — 63600175 PHARM REV CODE 636 W HCPCS: Mod: JG | Performed by: INTERNAL MEDICINE

## 2019-04-12 RX ADMIN — DENOSUMAB 60 MG: 60 INJECTION SUBCUTANEOUS at 11:04

## 2019-04-15 DIAGNOSIS — G40.209 PARTIAL SYMPTOMATIC EPILEPSY WITH COMPLEX PARTIAL SEIZURES, NOT INTRACTABLE, WITHOUT STATUS EPILEPTICUS: ICD-10-CM

## 2019-04-15 RX ORDER — PHENOBARBITAL 64.8 MG/1
64.8 TABLET ORAL NIGHTLY
Qty: 90 TABLET | Refills: 5 | Status: SHIPPED | OUTPATIENT
Start: 2019-04-15 | End: 2019-10-07 | Stop reason: SDUPTHER

## 2019-05-03 DIAGNOSIS — G40.A09 NONINTRACTABLE ABSENCE EPILEPSY WITHOUT STATUS EPILEPTICUS: Chronic | ICD-10-CM

## 2019-05-03 RX ORDER — OXCARBAZEPINE 300 MG/1
300 TABLET, FILM COATED ORAL NIGHTLY
Qty: 90 TABLET | Refills: 3 | Status: SHIPPED | OUTPATIENT
Start: 2019-05-03 | End: 2019-10-08 | Stop reason: SDUPTHER

## 2019-05-13 ENCOUNTER — PATIENT MESSAGE (OUTPATIENT)
Dept: NEUROLOGY | Facility: CLINIC | Age: 84
End: 2019-05-13

## 2019-05-21 ENCOUNTER — INFUSION (OUTPATIENT)
Dept: INFUSION THERAPY | Facility: HOSPITAL | Age: 84
End: 2019-05-21
Attending: INTERNAL MEDICINE
Payer: MEDICARE

## 2019-05-21 VITALS — HEIGHT: 59 IN | WEIGHT: 134.69 LBS | BODY MASS INDEX: 27.15 KG/M2

## 2019-05-21 DIAGNOSIS — T45.1X5A ANEMIA DUE TO ANTINEOPLASTIC CHEMOTHERAPY: Primary | ICD-10-CM

## 2019-05-21 DIAGNOSIS — D64.81 ANEMIA DUE TO ANTINEOPLASTIC CHEMOTHERAPY: Primary | ICD-10-CM

## 2019-05-21 PROCEDURE — 25000003 PHARM REV CODE 250: Performed by: INTERNAL MEDICINE

## 2019-05-21 PROCEDURE — 96523 IRRIG DRUG DELIVERY DEVICE: CPT

## 2019-05-21 PROCEDURE — A4216 STERILE WATER/SALINE, 10 ML: HCPCS | Performed by: INTERNAL MEDICINE

## 2019-05-21 PROCEDURE — 63600175 PHARM REV CODE 636 W HCPCS: Performed by: INTERNAL MEDICINE

## 2019-05-21 RX ORDER — SODIUM CHLORIDE 0.9 % (FLUSH) 0.9 %
10 SYRINGE (ML) INJECTION
Status: COMPLETED | OUTPATIENT
Start: 2019-05-21 | End: 2019-05-21

## 2019-05-21 RX ORDER — HEPARIN 100 UNIT/ML
500 SYRINGE INTRAVENOUS
Status: COMPLETED | OUTPATIENT
Start: 2019-05-21 | End: 2019-05-21

## 2019-05-21 RX ADMIN — HEPARIN 500 UNITS: 100 SYRINGE at 10:05

## 2019-05-21 RX ADMIN — SODIUM CHLORIDE, PRESERVATIVE FREE 10 ML: 5 INJECTION INTRAVENOUS at 10:05

## 2019-05-21 NOTE — NURSING
Implanted port accessed, flushed easily with adequate blood return achieved.    Port flushed with NS and heparin per protocol and deaccessed.  Pt tolerated well with no questions or complaints.

## 2019-06-20 ENCOUNTER — OFFICE VISIT (OUTPATIENT)
Dept: CARDIOLOGY | Facility: CLINIC | Age: 84
End: 2019-06-20
Payer: MEDICARE

## 2019-06-20 VITALS
WEIGHT: 135 LBS | HEART RATE: 64 BPM | OXYGEN SATURATION: 96 % | BODY MASS INDEX: 27.27 KG/M2 | DIASTOLIC BLOOD PRESSURE: 80 MMHG | SYSTOLIC BLOOD PRESSURE: 170 MMHG

## 2019-06-20 DIAGNOSIS — C83.30 DIFFUSE LARGE B-CELL LYMPHOMA, UNSPECIFIED BODY REGION: Chronic | ICD-10-CM

## 2019-06-20 DIAGNOSIS — M54.50 CHRONIC BILATERAL LOW BACK PAIN WITHOUT SCIATICA: ICD-10-CM

## 2019-06-20 DIAGNOSIS — E03.9 ACQUIRED HYPOTHYROIDISM: Chronic | ICD-10-CM

## 2019-06-20 DIAGNOSIS — G89.29 CHRONIC BILATERAL LOW BACK PAIN WITHOUT SCIATICA: ICD-10-CM

## 2019-06-20 DIAGNOSIS — E03.9 HYPOTHYROIDISM, UNSPECIFIED TYPE: ICD-10-CM

## 2019-06-20 DIAGNOSIS — T45.1X5A ANEMIA DUE TO ANTINEOPLASTIC CHEMOTHERAPY: Chronic | ICD-10-CM

## 2019-06-20 DIAGNOSIS — M51.36 DDD (DEGENERATIVE DISC DISEASE), LUMBAR: ICD-10-CM

## 2019-06-20 DIAGNOSIS — G40.A09 NONINTRACTABLE ABSENCE EPILEPSY WITHOUT STATUS EPILEPTICUS: Chronic | ICD-10-CM

## 2019-06-20 DIAGNOSIS — M47.816 OSTEOARTHRITIS OF LUMBAR SPINE, UNSPECIFIED SPINAL OSTEOARTHRITIS COMPLICATION STATUS: Chronic | ICD-10-CM

## 2019-06-20 DIAGNOSIS — I70.1 BILATERAL RENAL ARTERY STENOSIS: ICD-10-CM

## 2019-06-20 DIAGNOSIS — M80.00XD AGE-RELATED OSTEOPOROSIS WITH CURRENT PATHOLOGICAL FRACTURE WITH ROUTINE HEALING: ICD-10-CM

## 2019-06-20 DIAGNOSIS — D64.81 ANEMIA DUE TO ANTINEOPLASTIC CHEMOTHERAPY: Chronic | ICD-10-CM

## 2019-06-20 DIAGNOSIS — D69.6 THROMBOCYTOPENIA, UNSPECIFIED: ICD-10-CM

## 2019-06-20 DIAGNOSIS — E87.1 CHRONIC HYPONATREMIA: Chronic | ICD-10-CM

## 2019-06-20 DIAGNOSIS — M46.1 DEGENERATIVE JOINT DISEASE OF SACROILIAC JOINT: Chronic | ICD-10-CM

## 2019-06-20 DIAGNOSIS — I15.0 RENOVASCULAR HYPERTENSION: Primary | Chronic | ICD-10-CM

## 2019-06-20 PROCEDURE — 99999 PR PBB SHADOW E&M-EST. PATIENT-LVL III: ICD-10-PCS | Mod: PBBFAC,,, | Performed by: INTERNAL MEDICINE

## 2019-06-20 PROCEDURE — 99213 OFFICE O/P EST LOW 20 MIN: CPT | Mod: PBBFAC,PO | Performed by: INTERNAL MEDICINE

## 2019-06-20 PROCEDURE — 99214 OFFICE O/P EST MOD 30 MIN: CPT | Mod: S$PBB,,, | Performed by: INTERNAL MEDICINE

## 2019-06-20 PROCEDURE — 99999 PR PBB SHADOW E&M-EST. PATIENT-LVL III: CPT | Mod: PBBFAC,,, | Performed by: INTERNAL MEDICINE

## 2019-06-20 PROCEDURE — 99214 PR OFFICE/OUTPT VISIT, EST, LEVL IV, 30-39 MIN: ICD-10-PCS | Mod: S$PBB,,, | Performed by: INTERNAL MEDICINE

## 2019-06-20 RX ORDER — PRAVASTATIN SODIUM 20 MG/1
20 TABLET ORAL DAILY
Qty: 90 TABLET | Refills: 3 | Status: SHIPPED | OUTPATIENT
Start: 2019-06-20 | End: 2019-09-18 | Stop reason: SDUPTHER

## 2019-06-20 RX ORDER — MAGNESIUM 250 MG
TABLET ORAL ONCE
Status: ON HOLD | COMMUNITY
End: 2019-11-12 | Stop reason: SDUPTHER

## 2019-06-20 NOTE — PROGRESS NOTES
Subjective:    Patient ID:  Annette Garcia is a 83 y.o. female who presents for follow-up of Hypertension      HPI     82 y/o female with hx of difficult to control HTN (previously on a diuretic but dicsontinued due to recurrent dehydration), renal artery stenosis (LRA severe ostial disease s/p YOLANDA, RRA with mild-mod disease), anemia, acquired hypothyroidism, diffuse large B-cell lymphoma previously on chemo (last PET showed remission). She presents for f/u HTN and renal artery stenosis.  Previous clinic visits with uncontrolled HTN and she had been taking clonidine only once daily, started taking BID and BP improved with SBP mostly in the 140-150's. Today's visit, BP again elevated. Had some recent dietary indiscretion. Has had prior visits admitting to dietary indiscretion with high sodium diet including Nicole's Pie, Corn bisque, and onion rings (previously was V8, canned foods, TV dinners, soft drinks, gumbo, chinese food).   Has recently over the past few months been more fatigued than normal. She denies CP, SOB, orthopnea, PND, syncope. Has ZARAGOZA which is chronic.    Review of Systems   Constitution: Positive for malaise/fatigue.   HENT: Negative for congestion.    Eyes: Negative for blurred vision.   Cardiovascular: Positive for dyspnea on exertion. Negative for chest pain, claudication, cyanosis, irregular heartbeat, leg swelling, near-syncope, orthopnea, palpitations, paroxysmal nocturnal dyspnea and syncope.   Respiratory: Negative for shortness of breath.    Endocrine: Negative for polyuria.   Hematologic/Lymphatic: Negative for bleeding problem.   Skin: Negative for itching and rash.   Musculoskeletal: Positive for muscle weakness. Negative for joint swelling and muscle cramps.   Gastrointestinal: Negative for abdominal pain, hematemesis, hematochezia, melena, nausea and vomiting.   Genitourinary: Negative for dysuria and hematuria.   Neurological: Positive for weakness. Negative for dizziness, focal  weakness, headaches, light-headedness and loss of balance.   Psychiatric/Behavioral: Negative for depression. The patient is not nervous/anxious.         Objective:    Physical Exam   Constitutional: She is oriented to person, place, and time. She appears well-developed and well-nourished.   HENT:   Head: Normocephalic and atraumatic.   Neck: Neck supple. No JVD present.   Cardiovascular: Normal rate, regular rhythm and normal heart sounds.   Pulses:       Carotid pulses are 2+ on the right side, and 2+ on the left side.       Radial pulses are 2+ on the right side, and 2+ on the left side.        Femoral pulses are 2+ on the right side, and 2+ on the left side.       Posterior tibial pulses are 1+ on the right side, and 1+ on the left side.   Pulmonary/Chest: Effort normal and breath sounds normal.   Abdominal: Soft. Bowel sounds are normal.   Musculoskeletal: She exhibits no edema.   Neurological: She is alert and oriented to person, place, and time.   Skin: Skin is warm and dry.   Psychiatric: She has a normal mood and affect. Her behavior is normal. Thought content normal.         Assessment:       1. Renovascular hypertension    2. Hypothyroidism, unspecified type    3. Bilateral renal artery stenosis    4. Chronic hyponatremia    5. Thrombocytopenia, unspecified    6. Anemia due to antineoplastic chemotherapy    7. Diffuse large B-cell lymphoma, unspecified body region    8. Acquired hypothyroidism    9. Age-related osteoporosis with current pathological fracture with routine healing    10. Chronic bilateral low back pain without sciatica    11. DDD (degenerative disc disease), lumbar    12. Degenerative joint disease of sacroiliac joint    13. Nonintractable absence epilepsy without status epilepticus    14. Osteoarthritis of lumbar spine, unspecified spinal osteoarthritis complication status      84 y/o pt with hx and presentation as above. Doing well from a cardiac perspective and compensated from a HF  perspective. Fatigue is new and will obtain TSH. Discussed the etiology, evaluation, and management of HTN. Discussed the importance of med compliance, heart healthy diet, and regular exercise. Dietary and medication compliance.      Plan:       -Check TSH  -f/u in 1 month with Bp log

## 2019-06-21 ENCOUNTER — LAB VISIT (OUTPATIENT)
Dept: LAB | Facility: HOSPITAL | Age: 84
End: 2019-06-21
Attending: INTERNAL MEDICINE
Payer: MEDICARE

## 2019-06-21 DIAGNOSIS — E03.9 HYPOTHYROIDISM, UNSPECIFIED TYPE: ICD-10-CM

## 2019-06-21 LAB
T4 FREE SERPL-MCNC: 1.03 NG/DL (ref 0.71–1.51)
TSH SERPL DL<=0.005 MIU/L-ACNC: 5.82 UIU/ML (ref 0.4–4)

## 2019-06-21 PROCEDURE — 84439 ASSAY OF FREE THYROXINE: CPT

## 2019-06-21 PROCEDURE — 84443 ASSAY THYROID STIM HORMONE: CPT | Mod: PO

## 2019-06-21 PROCEDURE — 36415 COLL VENOUS BLD VENIPUNCTURE: CPT | Mod: PO

## 2019-06-24 ENCOUNTER — TELEPHONE (OUTPATIENT)
Dept: CARDIOLOGY | Facility: CLINIC | Age: 84
End: 2019-06-24

## 2019-06-24 ENCOUNTER — TELEPHONE (OUTPATIENT)
Dept: FAMILY MEDICINE | Facility: CLINIC | Age: 84
End: 2019-06-24

## 2019-06-24 DIAGNOSIS — E03.9 HYPOTHYROIDISM (ACQUIRED): Primary | ICD-10-CM

## 2019-06-24 RX ORDER — LEVOTHYROXINE SODIUM 125 UG/1
125 TABLET ORAL DAILY
Qty: 90 TABLET | Refills: 0 | Status: SHIPPED | OUTPATIENT
Start: 2019-06-24 | End: 2019-09-13 | Stop reason: SDUPTHER

## 2019-06-24 NOTE — TELEPHONE ENCOUNTER
Please call her and tell her Dr. Warner cardiology sent me her lab work-I would like to increase her Synthroid from 100-to 125 mcg daily.  Return to clinic in about two months-also repeat TSH and T4 I will order

## 2019-06-24 NOTE — TELEPHONE ENCOUNTER
----- Message from Timmy Simmons MD sent at 6/24/2019  2:35 PM CDT -----  Hey!   Ms Garcia has been complaining of worsening fatigue over the past few months and I checked a TSH level that appears to be elevated (reflex T4 normal). Didn't know if you wanted to change synthroid dose; I'll defer that to you.  Thanks,  ALEX

## 2019-06-24 NOTE — TELEPHONE ENCOUNTER
----- Message from Timmy Simmons MD sent at 6/24/2019  2:34 PM CDT -----  Your TSH level was elevated. Will message Dr Valles to see if he needs to adjust your thyroid medication.

## 2019-06-28 ENCOUNTER — PATIENT MESSAGE (OUTPATIENT)
Dept: NEUROLOGY | Facility: CLINIC | Age: 84
End: 2019-06-28

## 2019-07-11 ENCOUNTER — PATIENT MESSAGE (OUTPATIENT)
Dept: NEUROLOGY | Facility: CLINIC | Age: 84
End: 2019-07-11

## 2019-07-20 ENCOUNTER — PATIENT OUTREACH (OUTPATIENT)
Dept: ADMINISTRATIVE | Facility: OTHER | Age: 84
End: 2019-07-20

## 2019-07-23 ENCOUNTER — OFFICE VISIT (OUTPATIENT)
Dept: NEUROLOGY | Facility: CLINIC | Age: 84
End: 2019-07-23
Payer: MEDICARE

## 2019-07-23 ENCOUNTER — LAB VISIT (OUTPATIENT)
Dept: LAB | Facility: HOSPITAL | Age: 84
End: 2019-07-23
Attending: PSYCHIATRY & NEUROLOGY
Payer: MEDICARE

## 2019-07-23 VITALS
WEIGHT: 133.19 LBS | BODY MASS INDEX: 26.85 KG/M2 | DIASTOLIC BLOOD PRESSURE: 75 MMHG | SYSTOLIC BLOOD PRESSURE: 158 MMHG | HEART RATE: 68 BPM | HEIGHT: 59 IN

## 2019-07-23 DIAGNOSIS — Z51.81 THERAPEUTIC DRUG MONITORING: ICD-10-CM

## 2019-07-23 DIAGNOSIS — Z51.81 THERAPEUTIC DRUG MONITORING: Primary | ICD-10-CM

## 2019-07-23 DIAGNOSIS — G40.909 NONINTRACTABLE EPILEPSY WITHOUT STATUS EPILEPTICUS, UNSPECIFIED EPILEPSY TYPE: ICD-10-CM

## 2019-07-23 LAB — PHENOBARB SERPL-MCNC: 13.1 UG/DL (ref 15–40)

## 2019-07-23 PROCEDURE — 99215 OFFICE O/P EST HI 40 MIN: CPT | Mod: S$PBB,,, | Performed by: PSYCHIATRY & NEUROLOGY

## 2019-07-23 PROCEDURE — 80184 ASSAY OF PHENOBARBITAL: CPT

## 2019-07-23 PROCEDURE — 99214 OFFICE O/P EST MOD 30 MIN: CPT | Mod: PBBFAC,PO | Performed by: PSYCHIATRY & NEUROLOGY

## 2019-07-23 PROCEDURE — 99999 PR PBB SHADOW E&M-EST. PATIENT-LVL IV: CPT | Mod: PBBFAC,,, | Performed by: PSYCHIATRY & NEUROLOGY

## 2019-07-23 PROCEDURE — 99215 PR OFFICE/OUTPT VISIT, EST, LEVL V, 40-54 MIN: ICD-10-PCS | Mod: S$PBB,,, | Performed by: PSYCHIATRY & NEUROLOGY

## 2019-07-23 PROCEDURE — 99999 PR PBB SHADOW E&M-EST. PATIENT-LVL IV: ICD-10-PCS | Mod: PBBFAC,,, | Performed by: PSYCHIATRY & NEUROLOGY

## 2019-07-23 RX ORDER — CHOLECALCIFEROL (VITAMIN D3) 25 MCG
1000 TABLET ORAL DAILY
COMMUNITY
End: 2022-08-29

## 2019-07-23 RX ORDER — CALCIUM CARBONATE 600 MG
600 TABLET ORAL ONCE
Status: ON HOLD | COMMUNITY
End: 2019-11-12 | Stop reason: SDUPTHER

## 2019-07-23 NOTE — ASSESSMENT & PLAN NOTE
-- continuing current PB and OXC, levels today  -- held extensive discussions versus risk benefit of adjustment of antiepileptic regimen at this point.  Patient would like to take some time to decide.
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
## 2019-07-23 NOTE — LETTER
July 23, 2019      Damien Bernal MD  [address]  [address]  [place] [address]           [facility] Neurology  [address], [address]  [facility] [address]  Phone: [phone]  Fax: [phone]          Patient: Annette Garcia   MR Number: [id number]   YOB: 1935   Date of Visit: 7/23/2019       Dear Dr. Damien Bernal:    Thank you for referring Annette Garcia to me for evaluation. Attached you will find relevant portions of my assessment and plan of care.    If you have questions, please do not hesitate to call me. I look forward to following Annette Garcia along with you.    Sincerely,    Gamal Lock MD    Enclosure  CC:  No Recipients    If you would like to receive this communication electronically, please contact [email or web address] or [phone] to request more information on [employer] Link access.    For providers and/or their staff who would like to refer a patient to [facility], please contact us through our one-stop-shop provider referral line, [facility], at [phone].    If you feel you have received this communication in error or would no longer like to receive these types of communications, please e-mail [email or web address]

## 2019-07-25 ENCOUNTER — OFFICE VISIT (OUTPATIENT)
Dept: CARDIOLOGY | Facility: CLINIC | Age: 84
End: 2019-07-25
Payer: MEDICARE

## 2019-07-25 VITALS
HEART RATE: 65 BPM | BODY MASS INDEX: 26.72 KG/M2 | SYSTOLIC BLOOD PRESSURE: 154 MMHG | DIASTOLIC BLOOD PRESSURE: 82 MMHG | OXYGEN SATURATION: 96 % | WEIGHT: 132.31 LBS

## 2019-07-25 DIAGNOSIS — I70.1 BILATERAL RENAL ARTERY STENOSIS: ICD-10-CM

## 2019-07-25 DIAGNOSIS — C83.30 DIFFUSE LARGE B-CELL LYMPHOMA, UNSPECIFIED BODY REGION: Chronic | ICD-10-CM

## 2019-07-25 DIAGNOSIS — M47.816 OSTEOARTHRITIS OF LUMBAR SPINE, UNSPECIFIED SPINAL OSTEOARTHRITIS COMPLICATION STATUS: Chronic | ICD-10-CM

## 2019-07-25 DIAGNOSIS — M80.00XD AGE-RELATED OSTEOPOROSIS WITH CURRENT PATHOLOGICAL FRACTURE WITH ROUTINE HEALING: ICD-10-CM

## 2019-07-25 DIAGNOSIS — T45.1X5A ANEMIA DUE TO ANTINEOPLASTIC CHEMOTHERAPY: Chronic | ICD-10-CM

## 2019-07-25 DIAGNOSIS — D64.81 ANEMIA DUE TO ANTINEOPLASTIC CHEMOTHERAPY: Chronic | ICD-10-CM

## 2019-07-25 DIAGNOSIS — E03.9 ACQUIRED HYPOTHYROIDISM: Chronic | ICD-10-CM

## 2019-07-25 DIAGNOSIS — S22.080D CLOSED WEDGE COMPRESSION FRACTURE OF ELEVENTH THORACIC VERTEBRA WITH ROUTINE HEALING: ICD-10-CM

## 2019-07-25 DIAGNOSIS — I15.0 RENOVASCULAR HYPERTENSION: Primary | Chronic | ICD-10-CM

## 2019-07-25 DIAGNOSIS — D69.6 THROMBOCYTOPENIA, UNSPECIFIED: ICD-10-CM

## 2019-07-25 DIAGNOSIS — G40.909 NONINTRACTABLE EPILEPSY WITHOUT STATUS EPILEPTICUS, UNSPECIFIED EPILEPSY TYPE: ICD-10-CM

## 2019-07-25 DIAGNOSIS — E87.1 CHRONIC HYPONATREMIA: Chronic | ICD-10-CM

## 2019-07-25 PROCEDURE — 99213 OFFICE O/P EST LOW 20 MIN: CPT | Mod: PBBFAC,PO | Performed by: INTERNAL MEDICINE

## 2019-07-25 PROCEDURE — 99999 PR PBB SHADOW E&M-EST. PATIENT-LVL III: ICD-10-PCS | Mod: PBBFAC,,, | Performed by: INTERNAL MEDICINE

## 2019-07-25 PROCEDURE — 99999 PR PBB SHADOW E&M-EST. PATIENT-LVL III: CPT | Mod: PBBFAC,,, | Performed by: INTERNAL MEDICINE

## 2019-07-25 PROCEDURE — 99214 OFFICE O/P EST MOD 30 MIN: CPT | Mod: S$PBB,,, | Performed by: INTERNAL MEDICINE

## 2019-07-25 PROCEDURE — 99214 PR OFFICE/OUTPT VISIT, EST, LEVL IV, 30-39 MIN: ICD-10-PCS | Mod: S$PBB,,, | Performed by: INTERNAL MEDICINE

## 2019-07-25 NOTE — PROGRESS NOTES
Subjective:    Patient ID:  Annette Garcia is a 83 y.o. female who presents for follow-up of Hypertension      HPI     82 y/o female with hx of difficult to control HTN (previously on a diuretic but dicsontinued due to recurrent dehydration), renal artery stenosis (LRA severe ostial disease s/p YOLANDA, RRA with mild-mod disease), anemia, acquired hypothyroidism, diffuse large B-cell lymphoma previously on chemo (last PET showed remission). She presents for f/u HTN and renal artery stenosis.  Previous clinic visits with uncontrolled HTN and she had been taking clonidine only once daily, started taking BID and BP improved with SBP mostly in the 140-150's. Last visit, BP elevated 170's, improved today in the 150's. No recent dietary indiscretion. Has had prior visits admitting to dietary indiscretion with high sodium diet including Nicole's Pie, Corn bisque, and onion rings (previously was V8, canned foods, TV dinners, soft drinks, gumbo, chinese food).   Had recently over the past few months been more fatigued than normal, last visit TSH checked and abnormal and Dr Valles increased synthroid. Fatigue much improved. She denies CP, SOB, orthopnea, PND, syncope. Has ZARAGOZA which is chronic.    Review of Systems   Constitution: Negative for malaise/fatigue and weight gain.   HENT: Negative for congestion.    Eyes: Negative for blurred vision.   Cardiovascular: Positive for dyspnea on exertion. Negative for chest pain, claudication, cyanosis, irregular heartbeat, leg swelling, near-syncope, orthopnea, palpitations, paroxysmal nocturnal dyspnea and syncope.   Respiratory: Negative for shortness of breath.    Endocrine: Negative for polyuria.   Hematologic/Lymphatic: Negative for bleeding problem.   Skin: Negative for itching and rash.   Musculoskeletal: Positive for arthritis, back pain, joint pain and joint swelling. Negative for muscle cramps and muscle weakness.   Gastrointestinal: Negative for abdominal pain,  hematemesis, hematochezia, melena, nausea and vomiting.   Genitourinary: Negative for dysuria and hematuria.   Neurological: Negative for dizziness, focal weakness, headaches, light-headedness, loss of balance and weakness.   Psychiatric/Behavioral: Negative for depression. The patient is not nervous/anxious.         Objective:    Physical Exam   Constitutional: She is oriented to person, place, and time. She appears well-developed and well-nourished.   HENT:   Head: Normocephalic and atraumatic.   Neck: Neck supple. No JVD present.   Cardiovascular: Normal rate, regular rhythm and normal heart sounds.   Pulses:       Carotid pulses are 2+ on the right side, and 2+ on the left side.       Radial pulses are 2+ on the right side, and 2+ on the left side.        Femoral pulses are 2+ on the right side, and 2+ on the left side.       Dorsalis pedis pulses are 2+ on the right side, and 2+ on the left side.        Posterior tibial pulses are 2+ on the right side, and 2+ on the left side.   Pulmonary/Chest: Effort normal and breath sounds normal.   Abdominal: Soft. Bowel sounds are normal.   Musculoskeletal: She exhibits no edema.   Neurological: She is alert and oriented to person, place, and time.   Skin: Skin is warm and dry.   Psychiatric: She has a normal mood and affect. Her behavior is normal. Thought content normal.         Assessment:       1. Renovascular hypertension    2. Bilateral renal artery stenosis    3. Osteoarthritis of lumbar spine, unspecified spinal osteoarthritis complication status    4. Nonintractable epilepsy without status epilepticus, unspecified epilepsy type    5. Closed wedge compression fracture of eleventh thoracic vertebra with routine healing    6. Chronic hyponatremia    7. Thrombocytopenia, unspecified    8. Anemia due to antineoplastic chemotherapy    9. Diffuse large B-cell lymphoma, unspecified body region    10. Acquired hypothyroidism    11. Age-related osteoporosis with current  pathological fracture with routine healing      82 y/o pt with hx and presentation as above. Doing well from a cardiac perspective and compensated from a HF perspective. BP stable and improved, along with fatigue. Discussed the etiology, evaluation, and management of HTN. Discussed the importance of med compliance, heart healthy diet, and regular exercise.      Plan:       -Continue current medical management  -f/u in 6 months

## 2019-07-27 LAB — OXCARBAZEPINE METABOLITE: 4 MCG/ML (ref 3–35)

## 2019-07-29 ENCOUNTER — TELEPHONE (OUTPATIENT)
Dept: NEUROLOGY | Facility: CLINIC | Age: 84
End: 2019-07-29

## 2019-07-29 ENCOUNTER — TELEPHONE (OUTPATIENT)
Dept: FAMILY MEDICINE | Facility: CLINIC | Age: 84
End: 2019-07-29

## 2019-07-29 NOTE — TELEPHONE ENCOUNTER
----- Message from Kath Hannah sent at 7/29/2019 10:20 AM CDT -----  No. [phone]    Patient has a cold.  She also has sores in her head.  She has a ride to the office tomorrow.  Can patient come in tomorrow morning.

## 2019-07-29 NOTE — TELEPHONE ENCOUNTER
----- Message from Rita Huerta sent at 7/29/2019  2:37 PM CDT -----  Test Results    Type of Test:Labs  Date of Test:7/23  Communication Preference:pt @ [phone]  Additional Information:

## 2019-07-29 NOTE — TELEPHONE ENCOUNTER
She has sores in her head - wants to know if she would see a dermatologist    Also c/o cold ( just hoarseness ) - no cough - congestion in head - taking plain children robitussin - this helps somewhat   On going 3 to 4 days not getting worse  She wants to know if she Can take Claritin?  No fever    fyi - I offered her appt Wednesday ( she has no ride this week other then Tuesday )

## 2019-07-30 NOTE — TELEPHONE ENCOUNTER
"sertraline  Last Written Prescription Date:  1/2/2019  Last Fill Quantity: 30,  # refills: 5   Last office visit: 1/2/2019 with prescribing provider:      Future Office Visit:      Requested Prescriptions   Pending Prescriptions Disp Refills     sertraline (ZOLOFT) 50 MG tablet [Pharmacy Med Name: SERTRALINE HCL 50MG TABS] 30 tablet 5     Sig: TAKE ONE TABLET BY MOUTH ONCE DAILY       SSRIs Protocol Passed - 7/8/2019  7:22 AM        Passed - Recent (12 mo) or future (30 days) visit within the authorizing provider's specialty     Patient had office visit in the last 12 months or has a visit in the next 30 days with authorizing provider or within the authorizing provider's specialty.  See \"Patient Info\" tab in inbasket, or \"Choose Columns\" in Meds & Orders section of the refill encounter.              Passed - Medication is active on med list        Passed - Patient is age 18 or older        Passed - No active pregnancy on record        Passed - No positive pregnancy test in last 12 months     PHQ-9 SCORE 3/24/2014 2/21/2018 12/12/2018   PHQ-9 Total Score 0 - -   PHQ-9 Total Score - 0 17           Sertraline  Last Written Prescription Date:  1/2/2019  Last Fill Quantity: 30,  # refills: 5   Last office visit: 1/2/2019 with prescribing provider:      Future Office Visit:         sertraline (ZOLOFT) 100 MG tablet [Pharmacy Med Name: SERTRALINE HCL 100MG TABS] 30 tablet 5     Sig: TAKE ONE TABLET BY MOUTH ONCE DAILY       SSRIs Protocol Passed - 7/8/2019  7:22 AM        Passed - Recent (12 mo) or future (30 days) visit within the authorizing provider's specialty     Patient had office visit in the last 12 months or has a visit in the next 30 days with authorizing provider or within the authorizing provider's specialty.  See \"Patient Info\" tab in inbasket, or \"Choose Columns\" in Meds & Orders section of the refill encounter.              Passed - Medication is active on med list        Passed - Patient is age 18 or older " I called and notified the pt          Passed - No active pregnancy on record        Passed - No positive pregnancy test in last 12 months          Routing refill request to provider for review/approval because:  Unable to fill per RN protocol due to PHQ-9 >5. Also due for 6 month depression follow up. Sending to scheduling for outreach.    Iona Taylor RN on 7/8/2019 at 5:14 PM

## 2019-08-19 ENCOUNTER — LAB VISIT (OUTPATIENT)
Dept: LAB | Facility: HOSPITAL | Age: 84
End: 2019-08-19
Attending: FAMILY MEDICINE
Payer: MEDICARE

## 2019-08-19 DIAGNOSIS — E03.9 HYPOTHYROIDISM (ACQUIRED): ICD-10-CM

## 2019-08-19 LAB
T4 FREE SERPL-MCNC: 1.01 NG/DL (ref 0.71–1.51)
TSH SERPL DL<=0.005 MIU/L-ACNC: 1.58 UIU/ML (ref 0.4–4)

## 2019-08-19 PROCEDURE — 84439 ASSAY OF FREE THYROXINE: CPT

## 2019-08-19 PROCEDURE — 36415 COLL VENOUS BLD VENIPUNCTURE: CPT | Mod: PO

## 2019-08-19 PROCEDURE — 84443 ASSAY THYROID STIM HORMONE: CPT | Mod: PO

## 2019-08-20 ENCOUNTER — OFFICE VISIT (OUTPATIENT)
Dept: FAMILY MEDICINE | Facility: CLINIC | Age: 84
End: 2019-08-20
Payer: MEDICARE

## 2019-08-20 VITALS
HEART RATE: 82 BPM | TEMPERATURE: 98 F | DIASTOLIC BLOOD PRESSURE: 96 MMHG | SYSTOLIC BLOOD PRESSURE: 178 MMHG | OXYGEN SATURATION: 96 % | HEIGHT: 59 IN | WEIGHT: 134.81 LBS | BODY MASS INDEX: 27.18 KG/M2

## 2019-08-20 DIAGNOSIS — E03.9 HYPOTHYROIDISM (ACQUIRED): Primary | ICD-10-CM

## 2019-08-20 DIAGNOSIS — R51.9 NONINTRACTABLE HEADACHE, UNSPECIFIED CHRONICITY PATTERN, UNSPECIFIED HEADACHE TYPE: ICD-10-CM

## 2019-08-20 DIAGNOSIS — I10 ESSENTIAL HYPERTENSION: ICD-10-CM

## 2019-08-20 DIAGNOSIS — J30.1 ALLERGIC RHINITIS DUE TO POLLEN, UNSPECIFIED SEASONALITY: ICD-10-CM

## 2019-08-20 PROCEDURE — 99214 OFFICE O/P EST MOD 30 MIN: CPT | Mod: S$GLB,,, | Performed by: FAMILY MEDICINE

## 2019-08-20 PROCEDURE — 99214 PR OFFICE/OUTPT VISIT, EST, LEVL IV, 30-39 MIN: ICD-10-PCS | Mod: S$GLB,,, | Performed by: FAMILY MEDICINE

## 2019-08-20 NOTE — PROGRESS NOTES
" Patient ID: Annette Garcia is a 83 y.o. female.    Chief Complaint: 2 Month; Headches, Discuss labs    HPI      Annette Garcia is a 83 y.o. female headaches frontal and post neck when lying down to sleep feels like neck is throbing. Pain goes away in just a few minutes.    Hypothyroidism-stable    Hypertension-not fully controlled      Vitals:    08/20/19 1410   BP: (!) 178/96   Pulse: 82   Temp: 98.4 °F (36.9 °C)   SpO2: 96%   Weight: 61.1 kg (134 lb 13 oz)   Height: 4' 11" (1.499 m)            Review of Symptoms    Constitutional  Neg activity change, No chills /fever   Resp  Neg hemoptysis, stridor, choking  CVS  Neg chest pain, palpitations    Physical Exam    Constitutional:  Oriented to person, place, and time.appears well-developed and well-nourished.  No distress.      HENT  Head: Normocephalic and atraumatic  Right Ear: External ear normal.   Left Ear: External ear normal.   Nose: External nose normal.   Mouth:  Moist mucus membranes.    Eyes:  Conjunctivae are normal. Right eye exhibits no discharge.  Left eye exhibits no discharge. No scleral icterus.  No periorbital edema    Cardiovascular:  Regular rate and rhythm with normal S1 and S2     Pulmonary/Chest:   Clear to auscultation bilaterally without wheezes, rhonchi or rales      Musculoskeletal:  No edema. No obvious deformity No wasting       Neurological:  Alert and oriented to person, place, and time.   Coordination normal.     Skin:   Skin is warm and dry.  No diaphoresis.   No rash noted.     Psychiatric: Normal mood and affect. Behavior is normal.  Judgment and thought content normal.     Complete Blood Count  Lab Results   Component Value Date    RBC 3.62 (L) 04/11/2019    HGB 12.4 04/11/2019    HCT 36.2 (L) 04/11/2019     (H) 04/11/2019    MCH 34.3 (H) 04/11/2019    MCHC 34.3 04/11/2019    RDW 12.2 04/11/2019     (L) 04/11/2019    MPV 10.3 04/11/2019    GRAN 3.0 04/11/2019    GRAN 59.6 04/11/2019    LYMPH 1.4 04/11/2019 "    LYMPH 27.3 04/11/2019    MONO 0.5 04/11/2019    MONO 9.5 04/11/2019    EOS 0.2 04/11/2019    BASO 0.02 04/11/2019    EOSINOPHIL 3.0 04/11/2019    BASOPHIL 0.4 04/11/2019    DIFFMETHOD Automated 04/11/2019       Comprehensive Metabolic Panel  Lab Results   Component Value Date     04/11/2019    BUN 11 04/11/2019    CREATININE 0.51 04/11/2019     (L) 04/11/2019    K 3.8 04/11/2019    CL 91 (L) 04/11/2019    PROT 7.6 04/11/2019    ALBUMIN 4.7 04/11/2019    BILITOT 0.4 04/11/2019    AST 22 04/11/2019    ALKPHOS 65 04/11/2019    CO2 28 04/11/2019    ALT 14 04/11/2019    ANIONGAP 10 04/11/2019    EGFRNONAA >60.0 04/11/2019    ESTGFRAFRICA >60.0 04/11/2019       TSH  Lab Results   Component Value Date    TSH 1.580 08/19/2019       Assessment / Plan:    No diagnosis found.  There are no diagnoses linked to this encounter.

## 2019-08-23 ENCOUNTER — TELEPHONE (OUTPATIENT)
Dept: FAMILY MEDICINE | Facility: CLINIC | Age: 84
End: 2019-08-23

## 2019-08-23 NOTE — TELEPHONE ENCOUNTER
115) Tdap .50 ML IM / LD 08/20/2019 GLAXInteliVideoINE [id number] 06/08/2020     (187) zoster recombinant 1.00 EA IM / RD 08/20/2019 GLAXInteliVideoINE [id number] 10/22/2019

## 2019-08-28 ENCOUNTER — LAB VISIT (OUTPATIENT)
Dept: LAB | Facility: HOSPITAL | Age: 84
End: 2019-08-28
Attending: INTERNAL MEDICINE
Payer: MEDICARE

## 2019-08-28 DIAGNOSIS — C83.30 DIFFUSE LARGE B-CELL LYMPHOMA, UNSPECIFIED BODY REGION: ICD-10-CM

## 2019-08-28 DIAGNOSIS — E55.9 VITAMIN D DEFICIENCY: ICD-10-CM

## 2019-08-28 LAB
25(OH)D3+25(OH)D2 SERPL-MCNC: 30 NG/ML (ref 30–96)
ALBUMIN SERPL BCP-MCNC: 4.4 G/DL (ref 3.5–5.2)
ALP SERPL-CCNC: 67 U/L (ref 38–126)
ALT SERPL W/O P-5'-P-CCNC: 12 U/L (ref 10–44)
ANION GAP SERPL CALC-SCNC: 9 MMOL/L (ref 8–16)
AST SERPL-CCNC: 21 U/L (ref 15–46)
BASOPHILS # BLD AUTO: 0.03 K/UL (ref 0–0.2)
BASOPHILS NFR BLD: 0.5 % (ref 0–1.9)
BILIRUB SERPL-MCNC: 0.3 MG/DL (ref 0.1–1)
BUN SERPL-MCNC: 12 MG/DL (ref 7–17)
CALCIUM SERPL-MCNC: 9.5 MG/DL (ref 8.7–10.5)
CHLORIDE SERPL-SCNC: 93 MMOL/L (ref 95–110)
CO2 SERPL-SCNC: 29 MMOL/L (ref 23–29)
CREAT SERPL-MCNC: 0.51 MG/DL (ref 0.5–1.4)
DIFFERENTIAL METHOD: ABNORMAL
EOSINOPHIL # BLD AUTO: 0.1 K/UL (ref 0–0.5)
EOSINOPHIL NFR BLD: 2.5 % (ref 0–8)
ERYTHROCYTE [DISTWIDTH] IN BLOOD BY AUTOMATED COUNT: 12.3 % (ref 11.5–14.5)
EST. GFR  (AFRICAN AMERICAN): >60 ML/MIN/1.73 M^2
EST. GFR  (NON AFRICAN AMERICAN): >60 ML/MIN/1.73 M^2
GLUCOSE SERPL-MCNC: 100 MG/DL (ref 70–110)
HCT VFR BLD AUTO: 35.1 % (ref 37–48.5)
HGB BLD-MCNC: 11.5 G/DL (ref 12–16)
LDH SERPL L TO P-CCNC: 160 U/L (ref 110–260)
LYMPHOCYTES # BLD AUTO: 1.3 K/UL (ref 1–4.8)
LYMPHOCYTES NFR BLD: 23.6 % (ref 18–48)
MCH RBC QN AUTO: 33 PG (ref 27–31)
MCHC RBC AUTO-ENTMCNC: 32.8 G/DL (ref 32–36)
MCV RBC AUTO: 101 FL (ref 82–98)
MONOCYTES # BLD AUTO: 0.6 K/UL (ref 0.3–1)
MONOCYTES NFR BLD: 11.2 % (ref 4–15)
NEUTROPHILS # BLD AUTO: 3.4 K/UL (ref 1.8–7.7)
NEUTROPHILS NFR BLD: 62.2 % (ref 38–73)
PLATELET # BLD AUTO: 152 K/UL (ref 150–350)
PMV BLD AUTO: 10 FL (ref 9.2–12.9)
POTASSIUM SERPL-SCNC: 4 MMOL/L (ref 3.5–5.1)
PROT SERPL-MCNC: 6.9 G/DL (ref 6–8.4)
RBC # BLD AUTO: 3.48 M/UL (ref 4–5.4)
SODIUM SERPL-SCNC: 131 MMOL/L (ref 136–145)
URATE SERPL-MCNC: 3.1 MG/DL (ref 2.4–5.7)
WBC # BLD AUTO: 5.52 K/UL (ref 3.9–12.7)

## 2019-08-28 PROCEDURE — 84550 ASSAY OF BLOOD/URIC ACID: CPT

## 2019-08-28 PROCEDURE — 82306 VITAMIN D 25 HYDROXY: CPT | Mod: PO

## 2019-08-28 PROCEDURE — 80053 COMPREHEN METABOLIC PANEL: CPT | Mod: PO

## 2019-08-28 PROCEDURE — 85025 COMPLETE CBC W/AUTO DIFF WBC: CPT | Mod: PO

## 2019-08-28 PROCEDURE — 36415 COLL VENOUS BLD VENIPUNCTURE: CPT | Mod: PO

## 2019-08-28 PROCEDURE — 83615 LACTATE (LD) (LDH) ENZYME: CPT

## 2019-08-29 ENCOUNTER — OFFICE VISIT (OUTPATIENT)
Dept: HEMATOLOGY/ONCOLOGY | Facility: CLINIC | Age: 84
End: 2019-08-29
Payer: MEDICARE

## 2019-08-29 ENCOUNTER — HOSPITAL ENCOUNTER (EMERGENCY)
Facility: HOSPITAL | Age: 84
Discharge: HOME | End: 2019-08-29
Attending: EMERGENCY MEDICINE
Payer: MEDICARE

## 2019-08-29 VITALS
TEMPERATURE: 97 F | DIASTOLIC BLOOD PRESSURE: 101 MMHG | OXYGEN SATURATION: 97 % | HEART RATE: 66 BPM | RESPIRATION RATE: 16 BRPM | WEIGHT: 132.63 LBS | BODY MASS INDEX: 26.78 KG/M2 | SYSTOLIC BLOOD PRESSURE: 234 MMHG

## 2019-08-29 VITALS
BODY MASS INDEX: 26.74 KG/M2 | RESPIRATION RATE: 19 BRPM | OXYGEN SATURATION: 98 % | WEIGHT: 132.38 LBS | TEMPERATURE: 98 F | DIASTOLIC BLOOD PRESSURE: 74 MMHG | HEART RATE: 62 BPM | SYSTOLIC BLOOD PRESSURE: 178 MMHG

## 2019-08-29 DIAGNOSIS — C83.30 DIFFUSE LARGE B-CELL LYMPHOMA, UNSPECIFIED BODY REGION: Primary | ICD-10-CM

## 2019-08-29 DIAGNOSIS — I10 UNCONTROLLED HYPERTENSION: ICD-10-CM

## 2019-08-29 DIAGNOSIS — I10 HYPERTENSION: Primary | ICD-10-CM

## 2019-08-29 DIAGNOSIS — E55.9 VITAMIN D DEFICIENCY: ICD-10-CM

## 2019-08-29 DIAGNOSIS — M81.0 AGE-RELATED OSTEOPOROSIS WITHOUT CURRENT PATHOLOGICAL FRACTURE: ICD-10-CM

## 2019-08-29 LAB
ALBUMIN SERPL BCP-MCNC: 4.6 G/DL (ref 3.5–5.2)
ALP SERPL-CCNC: 60 U/L (ref 55–135)
ALT SERPL W/O P-5'-P-CCNC: 11 U/L (ref 10–44)
ANION GAP SERPL CALC-SCNC: 10 MMOL/L (ref 8–16)
AST SERPL-CCNC: 16 U/L (ref 10–40)
BASOPHILS # BLD AUTO: 0.02 K/UL (ref 0–0.2)
BASOPHILS NFR BLD: 0.4 % (ref 0–1.9)
BILIRUB SERPL-MCNC: 0.4 MG/DL (ref 0.1–1)
BNP SERPL-MCNC: 133 PG/ML (ref 0–99)
BUN SERPL-MCNC: 9 MG/DL (ref 8–23)
CALCIUM SERPL-MCNC: 10.5 MG/DL (ref 8.7–10.5)
CHLORIDE SERPL-SCNC: 94 MMOL/L (ref 95–110)
CO2 SERPL-SCNC: 31 MMOL/L (ref 23–29)
CREAT SERPL-MCNC: 0.7 MG/DL (ref 0.5–1.4)
DIFFERENTIAL METHOD: ABNORMAL
EOSINOPHIL # BLD AUTO: 0.2 K/UL (ref 0–0.5)
EOSINOPHIL NFR BLD: 3.2 % (ref 0–8)
ERYTHROCYTE [DISTWIDTH] IN BLOOD BY AUTOMATED COUNT: 12.7 % (ref 11.5–14.5)
EST. GFR  (AFRICAN AMERICAN): >60 ML/MIN/1.73 M^2
EST. GFR  (NON AFRICAN AMERICAN): >60 ML/MIN/1.73 M^2
GLUCOSE SERPL-MCNC: 117 MG/DL (ref 70–110)
HCT VFR BLD AUTO: 37.3 % (ref 37–48.5)
HGB BLD-MCNC: 12.7 G/DL (ref 12–16)
LYMPHOCYTES # BLD AUTO: 1.6 K/UL (ref 1–4.8)
LYMPHOCYTES NFR BLD: 32.8 % (ref 18–48)
MCH RBC QN AUTO: 33.8 PG (ref 27–31)
MCHC RBC AUTO-ENTMCNC: 34 G/DL (ref 32–36)
MCV RBC AUTO: 99 FL (ref 82–98)
MONOCYTES # BLD AUTO: 0.5 K/UL (ref 0.3–1)
MONOCYTES NFR BLD: 9.6 % (ref 4–15)
NEUTROPHILS # BLD AUTO: 2.7 K/UL (ref 1.8–7.7)
NEUTROPHILS NFR BLD: 54 % (ref 38–73)
PLATELET # BLD AUTO: 169 K/UL (ref 150–350)
PMV BLD AUTO: 10.6 FL (ref 9.2–12.9)
POTASSIUM SERPL-SCNC: 4.2 MMOL/L (ref 3.5–5.1)
PROT SERPL-MCNC: 7.3 G/DL (ref 6–8.4)
RBC # BLD AUTO: 3.76 M/UL (ref 4–5.4)
SODIUM SERPL-SCNC: 135 MMOL/L (ref 136–145)
TROPONIN I SERPL DL<=0.01 NG/ML-MCNC: <0.006 NG/ML (ref 0–0.03)
WBC # BLD AUTO: 5 K/UL (ref 3.9–12.7)

## 2019-08-29 PROCEDURE — 84484 ASSAY OF TROPONIN QUANT: CPT

## 2019-08-29 PROCEDURE — 99214 OFFICE O/P EST MOD 30 MIN: CPT | Mod: PBBFAC,PO | Performed by: INTERNAL MEDICINE

## 2019-08-29 PROCEDURE — 99215 PR OFFICE/OUTPT VISIT, EST, LEVL V, 40-54 MIN: ICD-10-PCS | Mod: S$PBB,,, | Performed by: INTERNAL MEDICINE

## 2019-08-29 PROCEDURE — 93010 EKG 12-LEAD: ICD-10-PCS | Mod: ,,, | Performed by: INTERNAL MEDICINE

## 2019-08-29 PROCEDURE — 93005 ELECTROCARDIOGRAM TRACING: CPT

## 2019-08-29 PROCEDURE — 99999 PR PBB SHADOW E&M-EST. PATIENT-LVL IV: ICD-10-PCS | Mod: PBBFAC,,, | Performed by: INTERNAL MEDICINE

## 2019-08-29 PROCEDURE — 83880 ASSAY OF NATRIURETIC PEPTIDE: CPT

## 2019-08-29 PROCEDURE — 80053 COMPREHEN METABOLIC PANEL: CPT

## 2019-08-29 PROCEDURE — 99999 PR PBB SHADOW E&M-EST. PATIENT-LVL IV: CPT | Mod: PBBFAC,,, | Performed by: INTERNAL MEDICINE

## 2019-08-29 PROCEDURE — 85025 COMPLETE CBC W/AUTO DIFF WBC: CPT

## 2019-08-29 PROCEDURE — 25000003 PHARM REV CODE 250: Performed by: EMERGENCY MEDICINE

## 2019-08-29 PROCEDURE — 99215 OFFICE O/P EST HI 40 MIN: CPT | Mod: S$PBB,,, | Performed by: INTERNAL MEDICINE

## 2019-08-29 PROCEDURE — 99285 EMERGENCY DEPT VISIT HI MDM: CPT | Mod: 25,27

## 2019-08-29 PROCEDURE — 93010 ELECTROCARDIOGRAM REPORT: CPT | Mod: ,,, | Performed by: INTERNAL MEDICINE

## 2019-08-29 RX ORDER — CLONIDINE HYDROCHLORIDE 0.2 MG/1
0.2 TABLET ORAL
Status: COMPLETED | OUTPATIENT
Start: 2019-08-29 | End: 2019-08-29

## 2019-08-29 RX ADMIN — CLONIDINE HYDROCHLORIDE 0.2 MG: 0.2 TABLET ORAL at 01:08

## 2019-08-29 NOTE — ED PROVIDER NOTES
Encounter Date: 8/29/2019    SCRIBE #1 NOTE: I, Javan Malin, am scribing for, and in the presence of,  Dr. Romero. I have scribed the entire note.       History     Chief Complaint   Patient presents with    Hypertension     83 year old female presents to ed cc of htn patient was at dr. rowan office for apt when bp was noted to be elevated patient reports took bp meds this am denies cp/ sob      Time seen by provider: 12:13 PM    This is a 83 y.o. female with HTN, hypothyroidism, seizures, and colon cancer who presents with complaint of hypertension. She has a history of lymphoma, and was sent to the ER after being found hypertensive at Dr. Arredondo's office for her regular checkup. On arrival, the patient has a BP of 234/101. Patient is currently asymptomatic, and denies any CP, HA, blurred vision, abdominal pain, or SOB. She is compliant with all of her medications, and she did take her medications around 0600 this morning.     The history is provided by the patient.     Review of patient's allergies indicates:   Allergen Reactions    Adhesive Itching and Blisters    Penicillins Anaphylaxis    Tramadol Hives    Avelox [moxifloxacin] Rash     Facial and arm itching and redness. Pt states throat closes when given.    Amoxil [amoxicillin]     Aspridrox [aspirin, buffered]     Codeine Other (See Comments)     Throat swelling    Keflex [cephalexin]     Norvasc [amlodipine]     Red dye Hives    Robitussin [guaifenesin]     Sulfa (sulfonamide antibiotics)     Tylenol [acetaminophen]      Has reaction to Tylenol with red dye and unable to take Extra Strength Tylenol/ CAN ONLY TOLERATE REG STRENGTH TYLENOL    Vicks vaporub [camphor-eucalyptus oil-menthol]      Past Medical History:   Diagnosis Date    Cancer     colon    Hypertension     Petit mal epilepsy 1954    Scoliosis of lumbar spine     Seizures     Unspecified hypothyroidism      Past Surgical History:   Procedure Laterality Date    APPENDECTOMY       BACK SURGERY  1988    vertebral fracture    BACK SURGERY  02/2013    lumbar L2-5    BIOPSY-BONE MARROW Right 9/26/2016    Performed by [facility]redondo [facility] at [facility]    CATARACT EXTRACTION, BILATERAL Bilateral     CHOLECYSTECTOMY      open    COLON SURGERY      EXPLORATORY-LAPAROTOMY, DILIP, possible bowel resection N/A 8/23/2016    Performed by Lucretia Rosa DO at [facility] OR    EYE SURGERY Bilateral     cataract removal with lens implant    HYSTERECTOMY      UCBZSBLFA-JWRA-B-CATH Right 9/21/2016    Performed by [facility] at [facility]    PORTACATH PLACEMENT Right 09/2016    RESECTION - SMALL BOWEL N/A 8/23/2016    Performed by Lu[facility] at [facility]    TONSILLECTOMY      VAGINAL HYSTERECTOMY W/ ANTERIOR AND POSTERIOR VAGINAL REPAIR       Family History   Problem Relation Age of Onset    Pancreatic cancer Mother     Hypertension Father     Heart attack Father      Social History     Tobacco Use    Smoking status: Never Smoker    Smokeless tobacco: Never Used   Substance Use Topics    Alcohol use: No    Drug use: No     Review of Systems   Constitutional: Negative for activity change, appetite change and fever.   HENT: Negative for congestion, ear pain and sore throat.    Eyes: Negative for discharge.   Respiratory: Negative for cough, shortness of breath and wheezing.    Cardiovascular: Negative for chest pain, palpitations and leg swelling.   Gastrointestinal: Negative for abdominal pain, diarrhea, nausea and vomiting.   Genitourinary: Negative for dysuria, frequency, urgency and vaginal discharge.   Musculoskeletal: Negative for back pain and neck pain.   Skin: Negative for rash.   Neurological: Negative for weakness, light-headedness and headaches.   Psychiatric/Behavioral: Negative for confusion and suicidal ideas.     Physical Exam     Initial Vitals [08/29/19 1152]   BP Pulse Resp Temp SpO2   (S) (!) 239/106 66 20 98.5 °F (36.9 °C) 98 %      MAP       --          Physical Exam    Nursing note and vitals reviewed.  Constitutional: She appears well-developed and well-nourished.   HENT:   Head: Normocephalic and atraumatic.   Mouth/Throat: Oropharynx is clear and moist.   Eyes: Conjunctivae and EOM are normal. Pupils are equal, round, and reactive to light.   Neck: Normal range of motion. Neck supple.   Cardiovascular: Normal rate, regular rhythm, normal heart sounds and intact distal pulses. Exam reveals no gallop and no friction rub.    No murmur heard.  Pulmonary/Chest: Breath sounds normal.   Abdominal: Soft. Bowel sounds are normal. She exhibits no distension. There is no tenderness. There is no rebound and no guarding.   Musculoskeletal: Normal range of motion. She exhibits no edema or tenderness.   Lymphadenopathy:     She has no cervical adenopathy.   Neurological: She is alert and oriented to person, place, and time. She has normal strength and normal reflexes.   Skin: Skin is warm and dry.   Psychiatric: She has a normal mood and affect. Her behavior is normal. Judgment and thought content normal.       ED Course   Procedures  Labs Reviewed   CBC W/ AUTO DIFFERENTIAL - Abnormal; Notable for the following components:       Result Value    RBC 3.76 (*)     Mean Corpuscular Volume 99 (*)     Mean Corpuscular Hemoglobin 33.8 (*)     All other components within normal limits   B-TYPE NATRIURETIC PEPTIDE - Abnormal; Notable for the following components:     (*)     All other components within normal limits   COMPREHENSIVE METABOLIC PANEL - Abnormal; Notable for the following components:    Sodium 135 (*)     Chloride 94 (*)     CO2 31 (*)     Glucose 117 (*)     All other components within normal limits   TROPONIN I     EKG Readings: (Independently Interpreted)   Rhythm: Normal Sinus Rhythm. Heart Rate: 63.   Time: 1206  No ST or T wave changes       X-Rays:   Independently Interpreted Readings:   Other Readings:  Reviewed by myself, read by radiology.    Imaging  Results          X-Ray Chest PA And Lateral (Final result)  Result time 08/29/19 13:56:16    Final result by Uzair Del Toro MD (08/29/19 13:56:16)                 Impression:      As above.      Electronically signed by: Uzair Del Toro MD  Date:    08/29/2019  Time:    13:56             Narrative:    EXAMINATION:  XR CHEST PA AND LATERAL    CLINICAL HISTORY:  Essential (primary) hypertension    TECHNIQUE:  PA and lateral views of the chest were performed.    FINDINGS:  There is a right-sided chest port with its tip within the SVC.  The lungs are clear.  There is no pneumothorax or pleural fluid.  The cardiac silhouette is not enlarged.  There is calcification of the aorta.  The osseous structures are significant for a chronic deformity of the right humeral head.  There is a wedge compression deformity within the lower thoracic spine which was not present on prior CT dated 01/23/2018 of unknown chronicity.  There is degenerative change throughout the spine.                              Medical Decision Making:   Clinical Tests:   Lab Tests: Ordered and Reviewed  Radiological Study: Ordered and Reviewed  Medical Tests: Ordered and Reviewed                   ED Course as of Aug 29 1534   Thu Aug 29, 2019   1531 Labs are within normal limits.  EKG is normal sinus rhythm.  The blood pressure is now 153/70 and the patient will be discharged at this time.    [ST]      ED Course User Index  [ST] Olga Romero MD     Clinical Impression:       ICD-10-CM ICD-9-CM   1. Hypertension I10 401.9       Disposition:   Disposition: Discharged  Condition: Stable      I, Olga Romero, personally performed the services described in this documentation. All medical record entries made by the scribe were at my direction and in my presence.  I have reviewed the chart and agree that the record reflects my personal performance and is accurate and complete. Olga Romero M.D. 3:34 PM08/29/2019     Olga Romero MD  08/29/19 1534

## 2019-08-29 NOTE — PROGRESS NOTES
Subjective:       Patient ID: Annette Garcia is a 83 y.o. female.    Chief Complaint: Lymphoma    HPI She is here for follow-up of Diffuse large B-cell lymphoma.  She was having abdominal discomfort for over 4 months and a CT scan done on 8/15/16 showed dilated loops of small bowel. She underwent explorative laparotomy (8/23) with resection of a segment of her small bowel by  - 2 different areas of strictures was noticed intraoperatively. Pathology revealed diffuse large B-cell lymphoma - germinal center origin (CD10 negative, BCL6 positive, MUM1 negative). Ki-67 was elevated at 75%. PET scan showed stage III disease. No lymphoma on bone marrow biopsy. Got 6 cycles of R-CHOP chemotherapy. Completed in Jan 2017.    PET scan (12/20/16) - Complete resolution of the hypermetabolic abnormality.     She fell at home in march 2017 and fractured her humerus. Sees .    Her  passed in jan 2018    Here for f/u. Has some new back pain. Saw pain Mx - getting PT    Has osteoporosis - Started PROLIA Oct 2018    Review of Systems   Constitutional: Negative for appetite change, fatigue, fever and unexpected weight change.   HENT: Negative for facial swelling and nosebleeds.    Eyes: Negative for photophobia and pain.   Respiratory: Negative for cough and shortness of breath.    Cardiovascular: Negative for chest pain and leg swelling.   Gastrointestinal: Negative for abdominal pain, blood in stool and nausea.   Genitourinary: Negative for dysuria and hematuria.   Skin: Negative for color change and rash.   Neurological: Negative for seizures, weakness and headaches.   Hematological: Negative for adenopathy. Does not bruise/bleed easily.       Objective:      Physical Exam   Constitutional: She is oriented to person, place, and time. No distress.   HENT:   Mouth/Throat: No oropharyngeal exudate.   Eyes: No scleral icterus.   Neck: Neck supple.   Cardiovascular: Normal rate. Exam reveals no gallop.    Pulmonary/Chest: Effort normal. She has no wheezes. She has no rales.   Abdominal: Soft. There is no tenderness.   Musculoskeletal: She exhibits no edema.   Lymphadenopathy:     She has no cervical adenopathy.   Neurological: She is alert and oriented to person, place, and time.   Skin: She is not diaphoretic.       Assessment:     1. Diffuse large B-cell lymphoma, unspecified body region    2. Age-related osteoporosis without current pathological fracture    3. Vitamin D deficiency    4. Uncontrolled hypertension          Plan:   She completed R-CHOP in Jan 2017 for Stage III DLBCL.    Labs reviewed.  Hb and Thrombocytopenia stable    She has osteoporosis bilateral hips.  No teeth issues.  Prolia started Oct 2018, Continue every 6 mo. Next dose due 10/1/19    Continue Caltrate with D and Vit D for Low Vit D level    She is not interested in port removal, will flush it every 4-6 months    Blood pressure uncontrolled, it is running between 230 and 240 systolic on multiple rechecks.  Diastolic is about 100.  She to call her blood pressure medications, she also takes Catapres which she took this morning.  Will send her to the ED for further evaluation

## 2019-08-29 NOTE — ED TRIAGE NOTES
Pt presents to ED with complaints of high blood pressure. Pt went to Dr. Arredondo's office for a regular check-up and had 3 high blood pressure readings. Dr. Arredondo sent pt to ER. Pt states she did take her BP medications this morning. Pt states she feels fine, has no other medical complaints. Pt denies chest pain, SOB, headache, blurred vision, and pain.

## 2019-09-13 RX ORDER — LEVOTHYROXINE SODIUM 125 UG/1
125 TABLET ORAL DAILY
Qty: 90 TABLET | Refills: 3 | Status: SHIPPED | OUTPATIENT
Start: 2019-09-13

## 2019-09-18 RX ORDER — PRAVASTATIN SODIUM 20 MG/1
20 TABLET ORAL DAILY
Qty: 90 TABLET | Refills: 3 | Status: SHIPPED | OUTPATIENT
Start: 2019-09-18 | End: 2022-05-20 | Stop reason: SDUPTHER

## 2019-09-18 NOTE — TELEPHONE ENCOUNTER
----- Message from Lizzette Hernández sent at 9/18/2019  8:57 AM CDT -----  Contact: Annette Garcia  No [phone]    Patient needs a medication refill of Pravastatin 20mg ZARINA. [facility] [place] [phone]. Patient only has two pills left.

## 2019-10-01 ENCOUNTER — INFUSION (OUTPATIENT)
Dept: INFUSION THERAPY | Facility: HOSPITAL | Age: 84
End: 2019-10-01
Attending: INTERNAL MEDICINE
Payer: MEDICARE

## 2019-10-01 VITALS — BODY MASS INDEX: 26.69 KG/M2 | WEIGHT: 132.38 LBS | HEIGHT: 59 IN

## 2019-10-01 DIAGNOSIS — M80.00XD AGE-RELATED OSTEOPOROSIS WITH CURRENT PATHOLOGICAL FRACTURE WITH ROUTINE HEALING: Primary | ICD-10-CM

## 2019-10-01 PROCEDURE — 63600175 PHARM REV CODE 636 W HCPCS: Mod: JG | Performed by: INTERNAL MEDICINE

## 2019-10-01 PROCEDURE — 96372 THER/PROPH/DIAG INJ SC/IM: CPT

## 2019-10-01 RX ADMIN — DENOSUMAB 60 MG: 60 INJECTION SUBCUTANEOUS at 10:10

## 2019-10-01 NOTE — NURSING
Patient tolerated Prolia injection well, post  inj instructions reviewed d/c with next appt scheduled and instructed on labs prior to next inj.

## 2019-10-07 ENCOUNTER — TELEPHONE (OUTPATIENT)
Dept: NEUROLOGY | Facility: CLINIC | Age: 84
End: 2019-10-07

## 2019-10-07 DIAGNOSIS — G40.209 PARTIAL SYMPTOMATIC EPILEPSY WITH COMPLEX PARTIAL SEIZURES, NOT INTRACTABLE, WITHOUT STATUS EPILEPTICUS: ICD-10-CM

## 2019-10-07 RX ORDER — PHENOBARBITAL 64.8 MG/1
64.8 TABLET ORAL NIGHTLY
Qty: 90 TABLET | Refills: 5 | Status: SHIPPED | OUTPATIENT
Start: 2019-10-07 | End: 2019-11-12 | Stop reason: SDUPTHER

## 2019-10-07 NOTE — TELEPHONE ENCOUNTER
I called the patient back she had questions about prescriptions. When it it time for the refills she will call.

## 2019-10-07 NOTE — TELEPHONE ENCOUNTER
----- Message from Ambreen Camacho sent at 10/7/2019  8:56 AM CDT -----  Contact: pt  Type: Patient Call Back    Who called:pt    What is the request in detail:pt has questions in regards to her medications. Call pt    Can the clinic reply by [facility]?    Would the patient rather a call back or a response via [facility]? Call back    Best call back number:[phone]    Additional Information:

## 2019-10-08 DIAGNOSIS — G40.A09 NONINTRACTABLE ABSENCE EPILEPSY WITHOUT STATUS EPILEPTICUS: Chronic | ICD-10-CM

## 2019-10-08 DIAGNOSIS — G40.209 PARTIAL SYMPTOMATIC EPILEPSY WITH COMPLEX PARTIAL SEIZURES, NOT INTRACTABLE, WITHOUT STATUS EPILEPTICUS: ICD-10-CM

## 2019-10-08 RX ORDER — PHENOBARBITAL 64.8 MG/1
64.8 TABLET ORAL NIGHTLY
Qty: 90 TABLET | Refills: 5 | OUTPATIENT
Start: 2019-10-08

## 2019-10-08 RX ORDER — OXCARBAZEPINE 300 MG/1
300 TABLET, FILM COATED ORAL NIGHTLY
Qty: 90 TABLET | Refills: 3 | Status: SHIPPED | OUTPATIENT
Start: 2019-10-08 | End: 2022-05-20 | Stop reason: SDUPTHER

## 2019-10-08 NOTE — TELEPHONE ENCOUNTER
----- Message from Janette Rothman sent at 10/8/2019  9:05 AM CDT -----  Contact: self  Type:  RX Refill Request    Who Called: patient states need Phenobarbital 64.8mg tablet, Oxcarbazepine 300mg tablet  Refill or New Rx:refill Phenobarbital 64.8mg tablet, Oxcarbazepine 300mg tablet    RX Name and Strength:  How is the patient currently taking it? (ex. 1XDay):  Is this a 30 day or 90 day RX  Preferred Pharmacy with phone number:[facility]  [phone]  Local or Mail Order:  Ordering Provider:  Dr Yeung  Would the patient rather a call back or a response via [facility]?   Best Call Back Number:[phone]  Additional Information: patient states just want to inform nurse pharmacy will be calling

## 2019-10-11 ENCOUNTER — PATIENT MESSAGE (OUTPATIENT)
Dept: NEUROLOGY | Facility: CLINIC | Age: 84
End: 2019-10-11

## 2019-10-11 ENCOUNTER — CLINICAL SUPPORT (OUTPATIENT)
Dept: FAMILY MEDICINE | Facility: CLINIC | Age: 84
End: 2019-10-11
Payer: MEDICARE

## 2019-10-11 ENCOUNTER — TELEPHONE (OUTPATIENT)
Dept: FAMILY MEDICINE | Facility: CLINIC | Age: 84
End: 2019-10-11

## 2019-10-11 DIAGNOSIS — R30.0 DYSURIA: Primary | ICD-10-CM

## 2019-10-11 LAB
BILIRUB SERPL-MCNC: ABNORMAL MG/DL
BLOOD URINE, POC: ABNORMAL
COLOR, POC UA: CLEAR
GLUCOSE UR QL STRIP: ABNORMAL
KETONES UR QL STRIP: ABNORMAL
LEUKOCYTE ESTERASE URINE, POC: ABNORMAL
NITRITE, POC UA: ABNORMAL
PH, POC UA: 8
PROTEIN, POC: 100
SPECIFIC GRAVITY, POC UA: 1
UROBILINOGEN, POC UA: ABNORMAL

## 2019-10-11 PROCEDURE — 81002 POCT URINE DIPSTICK WITHOUT MICROSCOPE: ICD-10-PCS | Mod: S$GLB,,, | Performed by: FAMILY MEDICINE

## 2019-10-11 PROCEDURE — 81002 URINALYSIS NONAUTO W/O SCOPE: CPT | Mod: S$GLB,,, | Performed by: FAMILY MEDICINE

## 2019-10-11 RX ORDER — NITROFURANTOIN 25; 75 MG/1; MG/1
100 CAPSULE ORAL 2 TIMES DAILY
Qty: 10 CAPSULE | Refills: 0 | Status: SHIPPED | OUTPATIENT
Start: 2019-10-11 | End: 2019-10-16

## 2019-10-11 NOTE — TELEPHONE ENCOUNTER
----- Message from Bruna Gaytan sent at 10/11/2019  9:24 AM CDT -----  Contact: daughter Lorenza [phone]  Patient requesting to speak with you regarding bringing in urine to be tested for a UTI.      I spoke with the pt daughter  talking out of her head  She is thinking stuff that is not true  She would like to check her urine first  If not maybe dementia  Not sleeping   Not eating  She did put in a call to the neurologist for her opinion    Urine poct shows large leukocytes and moderate blood    [facility]  Please advise

## 2019-10-14 ENCOUNTER — TELEPHONE (OUTPATIENT)
Dept: NEUROLOGY | Facility: CLINIC | Age: 84
End: 2019-10-14

## 2019-10-14 NOTE — TELEPHONE ENCOUNTER
I called and spoke with Annette and gave her the date and time for the appointment, she understood and wrote it down.

## 2019-10-15 ENCOUNTER — PATIENT OUTREACH (OUTPATIENT)
Dept: ADMINISTRATIVE | Facility: OTHER | Age: 84
End: 2019-10-15

## 2019-10-17 ENCOUNTER — TELEPHONE (OUTPATIENT)
Dept: FAMILY MEDICINE | Facility: CLINIC | Age: 84
End: 2019-10-17

## 2019-10-17 ENCOUNTER — OFFICE VISIT (OUTPATIENT)
Dept: NEUROLOGY | Facility: CLINIC | Age: 84
End: 2019-10-17
Payer: MEDICARE

## 2019-10-17 VITALS
HEART RATE: 67 BPM | DIASTOLIC BLOOD PRESSURE: 72 MMHG | SYSTOLIC BLOOD PRESSURE: 167 MMHG | WEIGHT: 133.38 LBS | BODY MASS INDEX: 26.89 KG/M2 | HEIGHT: 59 IN

## 2019-10-17 DIAGNOSIS — G40.909 NONINTRACTABLE EPILEPSY WITHOUT STATUS EPILEPTICUS, UNSPECIFIED EPILEPSY TYPE: ICD-10-CM

## 2019-10-17 DIAGNOSIS — R41.89 SUBJECTIVE MEMORY COMPLAINTS: ICD-10-CM

## 2019-10-17 DIAGNOSIS — F32.A DEPRESSION, UNSPECIFIED DEPRESSION TYPE: ICD-10-CM

## 2019-10-17 PROCEDURE — 99215 PR OFFICE/OUTPT VISIT, EST, LEVL V, 40-54 MIN: ICD-10-PCS | Mod: S$PBB,,, | Performed by: PSYCHIATRY & NEUROLOGY

## 2019-10-17 PROCEDURE — 99215 OFFICE O/P EST HI 40 MIN: CPT | Mod: S$PBB,,, | Performed by: PSYCHIATRY & NEUROLOGY

## 2019-10-17 PROCEDURE — 99213 OFFICE O/P EST LOW 20 MIN: CPT | Mod: PBBFAC,PO | Performed by: PSYCHIATRY & NEUROLOGY

## 2019-10-17 PROCEDURE — 99999 PR PBB SHADOW E&M-EST. PATIENT-LVL III: ICD-10-PCS | Mod: PBBFAC,,, | Performed by: PSYCHIATRY & NEUROLOGY

## 2019-10-17 PROCEDURE — 99999 PR PBB SHADOW E&M-EST. PATIENT-LVL III: CPT | Mod: PBBFAC,,, | Performed by: PSYCHIATRY & NEUROLOGY

## 2019-10-17 RX ORDER — TETANUS TOXOID, REDUCED DIPHTHERIA TOXOID AND ACELLULAR PERTUSSIS VACCINE, ADSORBED 5; 2.5; 8; 8; 2.5 [IU]/.5ML; [IU]/.5ML; UG/.5ML; UG/.5ML; UG/.5ML
SUSPENSION INTRAMUSCULAR
COMMUNITY
Start: 2019-08-20 | End: 2019-10-18

## 2019-10-17 RX ORDER — ZOSTER VACCINE RECOMBINANT, ADJUVANTED 50 MCG/0.5
KIT INTRAMUSCULAR
Status: ON HOLD | COMMUNITY
Start: 2019-08-20 | End: 2019-11-12 | Stop reason: HOSPADM

## 2019-10-17 RX ORDER — MIRTAZAPINE 7.5 MG/1
7.5 TABLET, FILM COATED ORAL NIGHTLY
Qty: 90 TABLET | Refills: 3 | Status: ON HOLD | OUTPATIENT
Start: 2019-10-17 | End: 2019-11-12 | Stop reason: HOSPADM

## 2019-10-17 NOTE — TELEPHONE ENCOUNTER
----- Message from Ambreen Bernabe sent at 10/17/2019  2:32 PM CDT -----  Contact: self /[phone]  Can you see her tomorrow for a follow up? Please advise       I spoke with the pt daughter Beatris  She saw the neurologist today - she has cleared her on her end  And recommends she see you - she is better but still hallucinating a   Little - she wants you to recheck her urine  And c/o occasional nausea     Ok to schedule tomorrow afternoon?
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
## 2019-10-18 ENCOUNTER — OFFICE VISIT (OUTPATIENT)
Dept: FAMILY MEDICINE | Facility: CLINIC | Age: 84
End: 2019-10-18
Payer: MEDICARE

## 2019-10-18 VITALS
DIASTOLIC BLOOD PRESSURE: 76 MMHG | BODY MASS INDEX: 27.25 KG/M2 | SYSTOLIC BLOOD PRESSURE: 146 MMHG | OXYGEN SATURATION: 96 % | WEIGHT: 134.94 LBS | HEART RATE: 71 BPM

## 2019-10-18 DIAGNOSIS — I10 ESSENTIAL HYPERTENSION: ICD-10-CM

## 2019-10-18 DIAGNOSIS — R41.89 SUBJECTIVE MEMORY COMPLAINTS: ICD-10-CM

## 2019-10-18 DIAGNOSIS — E03.9 HYPOTHYROIDISM (ACQUIRED): ICD-10-CM

## 2019-10-18 DIAGNOSIS — R31.9 URINARY TRACT INFECTION WITH HEMATURIA, SITE UNSPECIFIED: Primary | ICD-10-CM

## 2019-10-18 DIAGNOSIS — N39.0 URINARY TRACT INFECTION WITH HEMATURIA, SITE UNSPECIFIED: Primary | ICD-10-CM

## 2019-10-18 LAB
BILIRUB SERPL-MCNC: NORMAL MG/DL
BLOOD URINE, POC: NORMAL
COLOR, POC UA: YELLOW
GLUCOSE UR QL STRIP: NORMAL
KETONES UR QL STRIP: NORMAL
LEUKOCYTE ESTERASE URINE, POC: NORMAL
NITRITE, POC UA: NORMAL
PH, POC UA: 7
PROTEIN, POC: NORMAL
SPECIFIC GRAVITY, POC UA: 1
UROBILINOGEN, POC UA: NORMAL

## 2019-10-18 PROCEDURE — 81002 URINALYSIS NONAUTO W/O SCOPE: CPT | Mod: S$GLB,,, | Performed by: FAMILY MEDICINE

## 2019-10-18 PROCEDURE — 99214 OFFICE O/P EST MOD 30 MIN: CPT | Mod: 25,S$GLB,, | Performed by: FAMILY MEDICINE

## 2019-10-18 PROCEDURE — 99214 PR OFFICE/OUTPT VISIT, EST, LEVL IV, 30-39 MIN: ICD-10-PCS | Mod: 25,S$GLB,, | Performed by: FAMILY MEDICINE

## 2019-10-18 PROCEDURE — 81002 POCT URINE DIPSTICK WITHOUT MICROSCOPE: ICD-10-PCS | Mod: S$GLB,,, | Performed by: FAMILY MEDICINE

## 2019-10-18 RX ORDER — ONDANSETRON 4 MG/1
4 TABLET, FILM COATED ORAL EVERY 8 HOURS PRN
Qty: 25 TABLET | Refills: 2 | Status: ON HOLD | OUTPATIENT
Start: 2019-10-18 | End: 2020-09-14 | Stop reason: SDUPTHER

## 2019-10-18 RX ORDER — TRIAMCINOLONE ACETONIDE 1 MG/G
CREAM TOPICAL 2 TIMES DAILY
Qty: 45 G | Refills: 3 | Status: ON HOLD | OUTPATIENT
Start: 2019-10-18 | End: 2019-11-22 | Stop reason: HOSPADM

## 2019-10-21 NOTE — PROGRESS NOTES
Patient ID: Annette Garcia is a 84 y.o. female.    Chief Complaint: F/u UTI and hallucinations    HPI      Annette Garcia is a 84 y.o. female patient here following up on UTI.  No UTI symptoms this time.  Patient with previous changes in mental status.  Recent visit with neurologist.  Does not find that she has dementia.  The family members and others agree that her mental status is not as on point as it used to be.    Vitals:    10/18/19 1326   BP: (!) 146/76   Pulse: 71   SpO2: 96%   Weight: 61.2 kg (134 lb 14.7 oz)            Review of Symptoms    Constitutional  Neg activity change, No chills /fever   Resp  Neg hemoptysis, stridor, choking  CVS  Neg chest pain, palpitations    Physical Exam    Constitutional:  Oriented to person, place, and time.appears well-developed and well-nourished.  No distress.      HENT  Head: Normocephalic and atraumatic  Right Ear: External ear normal.   Left Ear: External ear normal.   Nose: External nose normal.   Mouth:  Moist mucus membranes.    Eyes:  Conjunctivae are normal. Right eye exhibits no discharge.  Left eye exhibits no discharge. No scleral icterus.  No periorbital edema    Cardiovascular:  Regular rate and rhythm with normal S1 and S2     Pulmonary/Chest:   Clear to auscultation bilaterally without wheezes, rhonchi or rales      Musculoskeletal:  No edema. No obvious deformity No wasting       Neurological:  Alert and oriented to person, place, and time.   Walking a little slower and with a slightly widened gait       Skin:   Skin is warm and dry.  No diaphoresis.   No rash noted.     Psychiatric: Normal mood and affect. Behavior is normal.  Judgment and thought content normal.     Complete Blood Count  Lab Results   Component Value Date    RBC 3.76 (L) 08/29/2019    HGB 12.7 08/29/2019    HCT 37.3 08/29/2019    MCV 99 (H) 08/29/2019    MCH 33.8 (H) 08/29/2019    MCHC 34.0 08/29/2019    RDW 12.7 08/29/2019     08/29/2019    MPV 10.6 08/29/2019    GRAN  2.7 08/29/2019    GRAN 54.0 08/29/2019    LYMPH 1.6 08/29/2019    LYMPH 32.8 08/29/2019    MONO 0.5 08/29/2019    MONO 9.6 08/29/2019    EOS 0.2 08/29/2019    BASO 0.02 08/29/2019    EOSINOPHIL 3.2 08/29/2019    BASOPHIL 0.4 08/29/2019    DIFFMETHOD Automated 08/29/2019       Comprehensive Metabolic Panel  Lab Results   Component Value Date     (H) 08/29/2019    BUN 9 08/29/2019    CREATININE 0.7 08/29/2019     (L) 08/29/2019    K 4.2 08/29/2019    CL 94 (L) 08/29/2019    PROT 7.3 08/29/2019    ALBUMIN 4.6 08/29/2019    BILITOT 0.4 08/29/2019    AST 16 08/29/2019    ALKPHOS 60 08/29/2019    CO2 31 (H) 08/29/2019    ALT 11 08/29/2019    ANIONGAP 10 08/29/2019    EGFRNONAA >60 08/29/2019    ESTGFRAFRICA >60 08/29/2019       TSH  Lab Results   Component Value Date    TSH 1.580 08/19/2019       Assessment / Plan:      ICD-10-CM ICD-9-CM   1. Urinary tract infection with hematuria, site unspecified N39.0 599.0    R31.9 599.70   2. Essential hypertension I10 401.9   3. Hypothyroidism (acquired) E03.9 244.9   4. Subjective memory complaints R41.89 780.93     Urinary tract infection with hematuria, site unspecified  -     POCT URINE DIPSTICK WITHOUT MICROSCOPE    Essential hypertension    Hypothyroidism (acquired)    Subjective memory complaints    Other orders  -     ondansetron (ZOFRAN) 4 MG tablet; Take 1 tablet (4 mg total) by mouth every 8 (eight) hours as needed for Nausea.  Dispense: 25 tablet; Refill: 2  -     triamcinolone acetonide 0.1% (KENALOG) 0.1 % cream; Apply topically 2 (two) times daily. When needed for sores on her head  Dispense: 45 g; Refill: 3

## 2019-10-27 ENCOUNTER — HOSPITAL ENCOUNTER (INPATIENT)
Facility: HOSPITAL | Age: 84
LOS: 15 days | Discharge: NURSING, REHAB OR LONG-TERM CARE | DRG: 853 | End: 2019-11-12
Attending: EMERGENCY MEDICINE | Admitting: FAMILY MEDICINE
Payer: MEDICARE

## 2019-10-27 DIAGNOSIS — K55.049: ICD-10-CM

## 2019-10-27 DIAGNOSIS — R65.21: ICD-10-CM

## 2019-10-27 DIAGNOSIS — Z98.890 S/P EXPLORATORY LAPAROTOMY: ICD-10-CM

## 2019-10-27 DIAGNOSIS — I45.5 SINUS PAUSE: ICD-10-CM

## 2019-10-27 DIAGNOSIS — B96.20 E COLI BACTEREMIA: ICD-10-CM

## 2019-10-27 DIAGNOSIS — K56.41: ICD-10-CM

## 2019-10-27 DIAGNOSIS — R00.0 TACHYCARDIA: ICD-10-CM

## 2019-10-27 DIAGNOSIS — R78.81 E COLI BACTEREMIA: ICD-10-CM

## 2019-10-27 DIAGNOSIS — R07.9 CHEST PAIN: ICD-10-CM

## 2019-10-27 DIAGNOSIS — G40.909 EPILEPSY: Chronic | ICD-10-CM

## 2019-10-27 DIAGNOSIS — A41.51: ICD-10-CM

## 2019-10-27 DIAGNOSIS — I48.91 ATRIAL FIBRILLATION: ICD-10-CM

## 2019-10-27 DIAGNOSIS — E87.20 LACTIC ACIDOSIS: ICD-10-CM

## 2019-10-27 DIAGNOSIS — T82.128A: ICD-10-CM

## 2019-10-27 DIAGNOSIS — K59.01 SLOW TRANSIT CONSTIPATION: Primary | ICD-10-CM

## 2019-10-27 DIAGNOSIS — K52.9 ENTERITIS: ICD-10-CM

## 2019-10-27 LAB
BASOPHILS # BLD AUTO: 0.01 K/UL (ref 0–0.2)
BASOPHILS NFR BLD: 0.2 % (ref 0–1.9)
DIFFERENTIAL METHOD: ABNORMAL
EOSINOPHIL # BLD AUTO: 0 K/UL (ref 0–0.5)
EOSINOPHIL NFR BLD: 0.3 % (ref 0–8)
ERYTHROCYTE [DISTWIDTH] IN BLOOD BY AUTOMATED COUNT: 14.4 % (ref 11.5–14.5)
HCT VFR BLD AUTO: 44.8 % (ref 37–48.5)
HGB BLD-MCNC: 15.1 G/DL (ref 12–16)
LYMPHOCYTES # BLD AUTO: 0.9 K/UL (ref 1–4.8)
LYMPHOCYTES NFR BLD: 14.9 % (ref 18–48)
MCH RBC QN AUTO: 33.7 PG (ref 27–31)
MCHC RBC AUTO-ENTMCNC: 33.7 G/DL (ref 32–36)
MCV RBC AUTO: 100 FL (ref 82–98)
MONOCYTES # BLD AUTO: 0.2 K/UL (ref 0.3–1)
MONOCYTES NFR BLD: 4.1 % (ref 4–15)
NEUTROPHILS # BLD AUTO: 4.7 K/UL (ref 1.8–7.7)
NEUTROPHILS NFR BLD: 80.5 % (ref 38–73)
PLATELET # BLD AUTO: 170 K/UL (ref 150–350)
PMV BLD AUTO: 10.5 FL (ref 9.2–12.9)
RBC # BLD AUTO: 4.48 M/UL (ref 4–5.4)
WBC # BLD AUTO: 5.82 K/UL (ref 3.9–12.7)

## 2019-10-27 PROCEDURE — 63600175 PHARM REV CODE 636 W HCPCS: Performed by: EMERGENCY MEDICINE

## 2019-10-27 PROCEDURE — 85025 COMPLETE CBC W/AUTO DIFF WBC: CPT

## 2019-10-27 PROCEDURE — 25500020 PHARM REV CODE 255: Performed by: EMERGENCY MEDICINE

## 2019-10-27 PROCEDURE — 96361 HYDRATE IV INFUSION ADD-ON: CPT

## 2019-10-27 PROCEDURE — 99285 EMERGENCY DEPT VISIT HI MDM: CPT | Mod: 25

## 2019-10-27 PROCEDURE — 83605 ASSAY OF LACTIC ACID: CPT

## 2019-10-27 PROCEDURE — 96374 THER/PROPH/DIAG INJ IV PUSH: CPT

## 2019-10-27 PROCEDURE — 96376 TX/PRO/DX INJ SAME DRUG ADON: CPT

## 2019-10-27 PROCEDURE — 96375 TX/PRO/DX INJ NEW DRUG ADDON: CPT

## 2019-10-27 RX ORDER — ONDANSETRON 2 MG/ML
4 INJECTION INTRAMUSCULAR; INTRAVENOUS
Status: COMPLETED | OUTPATIENT
Start: 2019-10-27 | End: 2019-10-27

## 2019-10-27 RX ORDER — FENTANYL CITRATE 50 UG/ML
50 INJECTION, SOLUTION INTRAMUSCULAR; INTRAVENOUS
Status: COMPLETED | OUTPATIENT
Start: 2019-10-27 | End: 2019-10-27

## 2019-10-27 RX ADMIN — FENTANYL CITRATE 50 MCG: 50 INJECTION, SOLUTION INTRAMUSCULAR; INTRAVENOUS at 10:10

## 2019-10-27 RX ADMIN — ONDANSETRON 4 MG: 2 INJECTION INTRAMUSCULAR; INTRAVENOUS at 10:10

## 2019-10-27 RX ADMIN — IOHEXOL 75 ML: 350 INJECTION, SOLUTION INTRAVENOUS at 11:10

## 2019-10-27 RX ADMIN — SODIUM CHLORIDE, SODIUM LACTATE, POTASSIUM CHLORIDE, AND CALCIUM CHLORIDE 500 ML: .6; .31; .03; .02 INJECTION, SOLUTION INTRAVENOUS at 10:10

## 2019-10-28 ENCOUNTER — ANESTHESIA (OUTPATIENT)
Dept: EMERGENCY MEDICINE | Facility: HOSPITAL | Age: 84
DRG: 853 | End: 2019-10-28
Payer: MEDICARE

## 2019-10-28 ENCOUNTER — ANESTHESIA EVENT (OUTPATIENT)
Dept: EMERGENCY MEDICINE | Facility: HOSPITAL | Age: 84
DRG: 853 | End: 2019-10-28
Payer: MEDICARE

## 2019-10-28 PROBLEM — I73.9 PERIPHERAL VASCULAR DISEASE, UNSPECIFIED: Status: ACTIVE | Noted: 2019-10-28

## 2019-10-28 PROBLEM — I73.9 PERIPHERAL VASCULAR DISEASE, UNSPECIFIED: Chronic | Status: ACTIVE | Noted: 2019-10-28

## 2019-10-28 PROBLEM — E87.1 HYPONATREMIA: Status: ACTIVE | Noted: 2019-10-28

## 2019-10-28 PROBLEM — E87.20 LACTIC ACIDOSIS: Status: ACTIVE | Noted: 2019-10-28

## 2019-10-28 PROBLEM — N18.4 CHRONIC KIDNEY DISEASE, STAGE IV (SEVERE): Status: ACTIVE | Noted: 2019-10-28

## 2019-10-28 PROBLEM — K52.9 ENTERITIS: Status: ACTIVE | Noted: 2019-10-27

## 2019-10-28 PROBLEM — D69.6 THROMBOCYTOPENIA, UNSPECIFIED: Status: RESOLVED | Noted: 2017-03-14 | Resolved: 2019-10-28

## 2019-10-28 PROBLEM — K59.01 SLOW TRANSIT CONSTIPATION: Status: ACTIVE | Noted: 2019-10-28

## 2019-10-28 PROBLEM — Z98.890 HISTORY OF STENT INSERTION OF RENAL ARTERY: Chronic | Status: ACTIVE | Noted: 2017-07-19

## 2019-10-28 PROBLEM — I25.2 OLD MI (MYOCARDIAL INFARCTION): Status: ACTIVE | Noted: 2019-10-28

## 2019-10-28 PROBLEM — I25.2 OLD MI (MYOCARDIAL INFARCTION): Chronic | Status: ACTIVE | Noted: 2019-10-28

## 2019-10-28 PROBLEM — R74.8 ABNORMAL LIVER ENZYMES: Status: ACTIVE | Noted: 2019-10-28

## 2019-10-28 LAB
ALBUMIN SERPL BCP-MCNC: 3.6 G/DL (ref 3.5–5.2)
ALP SERPL-CCNC: 57 U/L (ref 55–135)
ALT SERPL W/O P-5'-P-CCNC: 361 U/L (ref 10–44)
ANION GAP SERPL CALC-SCNC: 11 MMOL/L (ref 8–16)
ANION GAP SERPL CALC-SCNC: 11 MMOL/L (ref 8–16)
ANION GAP SERPL CALC-SCNC: 15 MMOL/L (ref 8–16)
AST SERPL-CCNC: 487 U/L (ref 10–40)
BACTERIA #/AREA URNS HPF: ABNORMAL /HPF
BASOPHILS # BLD AUTO: 0 K/UL (ref 0–0.2)
BASOPHILS NFR BLD: 0 % (ref 0–1.9)
BILIRUB SERPL-MCNC: 1.1 MG/DL (ref 0.1–1)
BILIRUB UR QL STRIP: ABNORMAL
BILIRUB UR QL STRIP: NEGATIVE
BUN SERPL-MCNC: 27 MG/DL (ref 8–23)
BUN SERPL-MCNC: 38 MG/DL (ref 8–23)
BUN SERPL-MCNC: 40 MG/DL (ref 8–23)
CALCIUM SERPL-MCNC: 10.1 MG/DL (ref 8.7–10.5)
CALCIUM SERPL-MCNC: 8.8 MG/DL (ref 8.7–10.5)
CALCIUM SERPL-MCNC: 8.9 MG/DL (ref 8.7–10.5)
CHLORIDE SERPL-SCNC: 95 MMOL/L (ref 95–110)
CHLORIDE SERPL-SCNC: 97 MMOL/L (ref 95–110)
CHLORIDE SERPL-SCNC: 97 MMOL/L (ref 95–110)
CK SERPL-CCNC: 38 U/L (ref 20–180)
CLARITY UR: CLEAR
CLARITY UR: CLEAR
CO2 SERPL-SCNC: 21 MMOL/L (ref 23–29)
CO2 SERPL-SCNC: 23 MMOL/L (ref 23–29)
CO2 SERPL-SCNC: 23 MMOL/L (ref 23–29)
COLOR UR: ABNORMAL
COLOR UR: YELLOW
CREAT SERPL-MCNC: 1.6 MG/DL (ref 0.5–1.4)
CREAT SERPL-MCNC: 2.3 MG/DL (ref 0.5–1.4)
CREAT SERPL-MCNC: 2.4 MG/DL (ref 0.5–1.4)
DIFFERENTIAL METHOD: ABNORMAL
EOSINOPHIL # BLD AUTO: 0 K/UL (ref 0–0.5)
EOSINOPHIL NFR BLD: 0.2 % (ref 0–8)
ERYTHROCYTE [DISTWIDTH] IN BLOOD BY AUTOMATED COUNT: 14 % (ref 11.5–14.5)
EST. GFR  (AFRICAN AMERICAN): 21 ML/MIN/1.73 M^2
EST. GFR  (AFRICAN AMERICAN): 22 ML/MIN/1.73 M^2
EST. GFR  (AFRICAN AMERICAN): 34 ML/MIN/1.73 M^2
EST. GFR  (NON AFRICAN AMERICAN): 18 ML/MIN/1.73 M^2
EST. GFR  (NON AFRICAN AMERICAN): 19 ML/MIN/1.73 M^2
EST. GFR  (NON AFRICAN AMERICAN): 30 ML/MIN/1.73 M^2
GLUCOSE SERPL-MCNC: 122 MG/DL (ref 70–110)
GLUCOSE SERPL-MCNC: 127 MG/DL (ref 70–110)
GLUCOSE SERPL-MCNC: 154 MG/DL (ref 70–110)
GLUCOSE UR QL STRIP: NEGATIVE
GLUCOSE UR QL STRIP: NEGATIVE
HCT VFR BLD AUTO: 35.8 % (ref 37–48.5)
HGB BLD-MCNC: 12 G/DL (ref 12–16)
HGB UR QL STRIP: ABNORMAL
HGB UR QL STRIP: NEGATIVE
HYALINE CASTS #/AREA URNS LPF: 0 /LPF
KETONES UR QL STRIP: ABNORMAL
KETONES UR QL STRIP: NEGATIVE
LACTATE SERPL-SCNC: 3.6 MMOL/L (ref 0.5–2.2)
LACTATE SERPL-SCNC: 3.9 MMOL/L (ref 0.5–2.2)
LACTATE SERPL-SCNC: 3.9 MMOL/L (ref 0.5–2.2)
LACTATE SERPL-SCNC: 4.1 MMOL/L (ref 0.5–2.2)
LACTATE SERPL-SCNC: 5.8 MMOL/L (ref 0.5–2.2)
LACTATE SERPL-SCNC: 6.2 MMOL/L (ref 0.5–2.2)
LEUKOCYTE ESTERASE UR QL STRIP: NEGATIVE
LEUKOCYTE ESTERASE UR QL STRIP: NEGATIVE
LYMPHOCYTES # BLD AUTO: 1 K/UL (ref 1–4.8)
LYMPHOCYTES NFR BLD: 19 % (ref 18–48)
MAGNESIUM SERPL-MCNC: 4.9 MG/DL (ref 1.6–2.6)
MCH RBC QN AUTO: 33.7 PG (ref 27–31)
MCHC RBC AUTO-ENTMCNC: 33.5 G/DL (ref 32–36)
MCV RBC AUTO: 101 FL (ref 82–98)
MICROSCOPIC COMMENT: ABNORMAL
MONOCYTES # BLD AUTO: 0.2 K/UL (ref 0.3–1)
MONOCYTES NFR BLD: 4.8 % (ref 4–15)
NEUTROPHILS # BLD AUTO: 3.8 K/UL (ref 1.8–7.7)
NEUTROPHILS NFR BLD: 76 % (ref 38–73)
NITRITE UR QL STRIP: NEGATIVE
NITRITE UR QL STRIP: POSITIVE
PH UR STRIP: 5 [PH] (ref 5–8)
PH UR STRIP: 8 [PH] (ref 5–8)
PHOSPHATE SERPL-MCNC: 4.9 MG/DL (ref 2.7–4.5)
PLATELET # BLD AUTO: 139 K/UL (ref 150–350)
PMV BLD AUTO: 10.2 FL (ref 9.2–12.9)
POTASSIUM SERPL-SCNC: 4.6 MMOL/L (ref 3.5–5.1)
POTASSIUM SERPL-SCNC: 4.6 MMOL/L (ref 3.5–5.1)
POTASSIUM SERPL-SCNC: 4.8 MMOL/L (ref 3.5–5.1)
PROT SERPL-MCNC: 6.1 G/DL (ref 6–8.4)
PROT UR QL STRIP: ABNORMAL
PROT UR QL STRIP: ABNORMAL
RBC # BLD AUTO: 3.56 M/UL (ref 4–5.4)
RBC #/AREA URNS HPF: 10 /HPF (ref 0–4)
SODIUM SERPL-SCNC: 131 MMOL/L (ref 136–145)
SP GR UR STRIP: 1.01 (ref 1–1.03)
SP GR UR STRIP: 1.02 (ref 1–1.03)
SQUAMOUS #/AREA URNS HPF: 2 /HPF
URN SPEC COLLECT METH UR: ABNORMAL
URN SPEC COLLECT METH UR: ABNORMAL
UROBILINOGEN UR STRIP-ACNC: ABNORMAL EU/DL
UROBILINOGEN UR STRIP-ACNC: NEGATIVE EU/DL
WBC # BLD AUTO: 5.04 K/UL (ref 3.9–12.7)
WBC #/AREA URNS HPF: 5 /HPF (ref 0–5)

## 2019-10-28 PROCEDURE — 63600175 PHARM REV CODE 636 W HCPCS: Performed by: INTERNAL MEDICINE

## 2019-10-28 PROCEDURE — 76937 US GUIDE VASCULAR ACCESS: CPT | Performed by: ANESTHESIOLOGY

## 2019-10-28 PROCEDURE — 83735 ASSAY OF MAGNESIUM: CPT

## 2019-10-28 PROCEDURE — 87040 BLOOD CULTURE FOR BACTERIA: CPT

## 2019-10-28 PROCEDURE — C1751 CATH, INF, PER/CENT/MIDLINE: HCPCS | Performed by: ANESTHESIOLOGY

## 2019-10-28 PROCEDURE — S0073 INJECTION, AZTREONAM, 500 MG: HCPCS | Performed by: INTERNAL MEDICINE

## 2019-10-28 PROCEDURE — 36556 INSERT NON-TUNNEL CV CATH: CPT

## 2019-10-28 PROCEDURE — 96361 HYDRATE IV INFUSION ADD-ON: CPT

## 2019-10-28 PROCEDURE — 83605 ASSAY OF LACTIC ACID: CPT | Mod: 91

## 2019-10-28 PROCEDURE — 51702 INSERT TEMP BLADDER CATH: CPT

## 2019-10-28 PROCEDURE — 25000003 PHARM REV CODE 250: Performed by: EMERGENCY MEDICINE

## 2019-10-28 PROCEDURE — 87077 CULTURE AEROBIC IDENTIFY: CPT

## 2019-10-28 PROCEDURE — 20000000 HC ICU ROOM

## 2019-10-28 PROCEDURE — 85025 COMPLETE CBC W/AUTO DIFF WBC: CPT

## 2019-10-28 PROCEDURE — 25000003 PHARM REV CODE 250: Performed by: FAMILY MEDICINE

## 2019-10-28 PROCEDURE — 81000 URINALYSIS NONAUTO W/SCOPE: CPT

## 2019-10-28 PROCEDURE — 83605 ASSAY OF LACTIC ACID: CPT

## 2019-10-28 PROCEDURE — 80053 COMPREHEN METABOLIC PANEL: CPT

## 2019-10-28 PROCEDURE — 81003 URINALYSIS AUTO W/O SCOPE: CPT

## 2019-10-28 PROCEDURE — S0030 INJECTION, METRONIDAZOLE: HCPCS | Performed by: EMERGENCY MEDICINE

## 2019-10-28 PROCEDURE — 99222 1ST HOSP IP/OBS MODERATE 55: CPT | Mod: ,,, | Performed by: STUDENT IN AN ORGANIZED HEALTH CARE EDUCATION/TRAINING PROGRAM

## 2019-10-28 PROCEDURE — 99222 PR INITIAL HOSPITAL CARE,LEVL II: ICD-10-PCS | Mod: ,,, | Performed by: STUDENT IN AN ORGANIZED HEALTH CARE EDUCATION/TRAINING PROGRAM

## 2019-10-28 PROCEDURE — 63600175 PHARM REV CODE 636 W HCPCS: Performed by: EMERGENCY MEDICINE

## 2019-10-28 PROCEDURE — 96376 TX/PRO/DX INJ SAME DRUG ADON: CPT

## 2019-10-28 PROCEDURE — 80048 BASIC METABOLIC PNL TOTAL CA: CPT | Mod: 91

## 2019-10-28 PROCEDURE — 97162 PT EVAL MOD COMPLEX 30 MIN: CPT | Performed by: PHYSICAL THERAPIST

## 2019-10-28 PROCEDURE — 25000003 PHARM REV CODE 250: Performed by: INTERNAL MEDICINE

## 2019-10-28 PROCEDURE — 80048 BASIC METABOLIC PNL TOTAL CA: CPT

## 2019-10-28 PROCEDURE — 87186 SC STD MICRODIL/AGAR DIL: CPT

## 2019-10-28 PROCEDURE — 84100 ASSAY OF PHOSPHORUS: CPT

## 2019-10-28 PROCEDURE — 82550 ASSAY OF CK (CPK): CPT

## 2019-10-28 PROCEDURE — 25000003 PHARM REV CODE 250: Performed by: NURSE PRACTITIONER

## 2019-10-28 PROCEDURE — 25000003 PHARM REV CODE 250: Performed by: ANESTHESIOLOGY

## 2019-10-28 PROCEDURE — 63600175 PHARM REV CODE 636 W HCPCS: Performed by: NURSE PRACTITIONER

## 2019-10-28 RX ORDER — SODIUM CHLORIDE, SODIUM LACTATE, POTASSIUM CHLORIDE, CALCIUM CHLORIDE 600; 310; 30; 20 MG/100ML; MG/100ML; MG/100ML; MG/100ML
INJECTION, SOLUTION INTRAVENOUS CONTINUOUS
Status: DISCONTINUED | OUTPATIENT
Start: 2019-10-28 | End: 2019-10-31

## 2019-10-28 RX ORDER — ONDANSETRON 2 MG/ML
4 INJECTION INTRAMUSCULAR; INTRAVENOUS
Status: COMPLETED | OUTPATIENT
Start: 2019-10-28 | End: 2019-10-28

## 2019-10-28 RX ORDER — SODIUM CHLORIDE 0.9 % (FLUSH) 0.9 %
10 SYRINGE (ML) INJECTION
Status: DISCONTINUED | OUTPATIENT
Start: 2019-10-28 | End: 2019-11-12 | Stop reason: HOSPADM

## 2019-10-28 RX ORDER — SODIUM CHLORIDE 9 MG/ML
1000 INJECTION, SOLUTION INTRAVENOUS CONTINUOUS
Status: ACTIVE | OUTPATIENT
Start: 2019-10-28 | End: 2019-10-28

## 2019-10-28 RX ORDER — IPRATROPIUM BROMIDE AND ALBUTEROL SULFATE 2.5; .5 MG/3ML; MG/3ML
3 SOLUTION RESPIRATORY (INHALATION) EVERY 6 HOURS PRN
Status: DISCONTINUED | OUTPATIENT
Start: 2019-10-28 | End: 2019-11-12 | Stop reason: HOSPADM

## 2019-10-28 RX ORDER — SODIUM CHLORIDE 9 MG/ML
1000 INJECTION, SOLUTION INTRAVENOUS CONTINUOUS
Status: DISCONTINUED | OUTPATIENT
Start: 2019-10-28 | End: 2019-10-28

## 2019-10-28 RX ORDER — OXCARBAZEPINE 300 MG/1
300 TABLET, FILM COATED ORAL NIGHTLY
Status: DISCONTINUED | OUTPATIENT
Start: 2019-10-28 | End: 2019-10-28

## 2019-10-28 RX ORDER — LACTULOSE 10 G/15ML
20 SOLUTION ORAL
Status: COMPLETED | OUTPATIENT
Start: 2019-10-28 | End: 2019-10-28

## 2019-10-28 RX ORDER — MIRTAZAPINE 7.5 MG/1
7.5 TABLET, FILM COATED ORAL NIGHTLY
Status: DISCONTINUED | OUTPATIENT
Start: 2019-10-28 | End: 2019-10-30

## 2019-10-28 RX ORDER — LIDOCAINE HYDROCHLORIDE 10 MG/ML
INJECTION, SOLUTION EPIDURAL; INFILTRATION; INTRACAUDAL; PERINEURAL
Status: COMPLETED | OUTPATIENT
Start: 2019-10-28 | End: 2019-10-28

## 2019-10-28 RX ORDER — PRAVASTATIN SODIUM 20 MG/1
20 TABLET ORAL DAILY
Status: DISCONTINUED | OUTPATIENT
Start: 2019-10-28 | End: 2019-11-12 | Stop reason: HOSPADM

## 2019-10-28 RX ORDER — CEFEPIME HYDROCHLORIDE 2 G/50ML
2 INJECTION, SOLUTION INTRAVENOUS
Status: COMPLETED | OUTPATIENT
Start: 2019-10-28 | End: 2019-10-28

## 2019-10-28 RX ORDER — PHENOBARBITAL 32.4 MG/1
64.8 TABLET ORAL NIGHTLY
Status: DISCONTINUED | OUTPATIENT
Start: 2019-10-28 | End: 2019-10-28

## 2019-10-28 RX ORDER — PHENOBARBITAL 32.4 MG/1
64.8 TABLET ORAL NIGHTLY
Status: DISCONTINUED | OUTPATIENT
Start: 2019-10-28 | End: 2019-11-12 | Stop reason: HOSPADM

## 2019-10-28 RX ORDER — OXCARBAZEPINE 150 MG/1
300 TABLET, FILM COATED ORAL NIGHTLY
Status: DISCONTINUED | OUTPATIENT
Start: 2019-10-28 | End: 2019-11-12 | Stop reason: HOSPADM

## 2019-10-28 RX ORDER — SODIUM POLYSTYRENE SULFONATE 15 G/60ML
15 SUSPENSION ORAL; RECTAL ONCE
Status: DISCONTINUED | OUTPATIENT
Start: 2019-10-28 | End: 2019-10-28

## 2019-10-28 RX ORDER — ACETAMINOPHEN 325 MG/1
650 TABLET ORAL EVERY 4 HOURS PRN
Status: DISCONTINUED | OUTPATIENT
Start: 2019-10-28 | End: 2019-11-06

## 2019-10-28 RX ORDER — RAMELTEON 8 MG/1
8 TABLET ORAL NIGHTLY PRN
Status: DISCONTINUED | OUTPATIENT
Start: 2019-10-28 | End: 2019-11-12 | Stop reason: HOSPADM

## 2019-10-28 RX ORDER — METRONIDAZOLE 500 MG/100ML
500 INJECTION, SOLUTION INTRAVENOUS
Status: COMPLETED | OUTPATIENT
Start: 2019-10-28 | End: 2019-10-28

## 2019-10-28 RX ORDER — NOREPINEPHRINE BITARTRATE/D5W 16MG/250ML
0.05 PLASTIC BAG, INJECTION (ML) INTRAVENOUS CONTINUOUS
Status: DISCONTINUED | OUTPATIENT
Start: 2019-10-28 | End: 2019-11-01

## 2019-10-28 RX ORDER — CEFEPIME HYDROCHLORIDE 2 G/50ML
2 INJECTION, SOLUTION INTRAVENOUS
Status: DISCONTINUED | OUTPATIENT
Start: 2019-10-29 | End: 2019-10-30

## 2019-10-28 RX ORDER — ONDANSETRON 8 MG/1
8 TABLET, ORALLY DISINTEGRATING ORAL EVERY 8 HOURS PRN
Status: DISCONTINUED | OUTPATIENT
Start: 2019-10-28 | End: 2019-11-12 | Stop reason: HOSPADM

## 2019-10-28 RX ADMIN — SODIUM CHLORIDE, SODIUM LACTATE, POTASSIUM CHLORIDE, AND CALCIUM CHLORIDE 500 ML: .6; .31; .03; .02 INJECTION, SOLUTION INTRAVENOUS at 02:10

## 2019-10-28 RX ADMIN — SODIUM CHLORIDE 500 ML: 0.9 INJECTION, SOLUTION INTRAVENOUS at 04:10

## 2019-10-28 RX ADMIN — Medication 1.5 MCG/KG/MIN: at 05:10

## 2019-10-28 RX ADMIN — Medication 2 MCG/KG/MIN: at 05:10

## 2019-10-28 RX ADMIN — Medication 0.1 MCG/KG/MIN: at 05:10

## 2019-10-28 RX ADMIN — CEFEPIME HYDROCHLORIDE 2 G: 2 INJECTION, SOLUTION INTRAVENOUS at 02:10

## 2019-10-28 RX ADMIN — PHENOBARBITAL 64.8 MG: 32.4 TABLET ORAL at 02:10

## 2019-10-28 RX ADMIN — Medication 1.25 MCG/KG/MIN: at 10:10

## 2019-10-28 RX ADMIN — Medication 0.05 MCG/KG/MIN: at 04:10

## 2019-10-28 RX ADMIN — Medication 1 MCG/KG/MIN: at 05:10

## 2019-10-28 RX ADMIN — LACTULOSE 20 G: 20 SOLUTION ORAL at 12:10

## 2019-10-28 RX ADMIN — SODIUM CHLORIDE, SODIUM LACTATE, POTASSIUM CHLORIDE, AND CALCIUM CHLORIDE 1000 ML: .6; .31; .03; .02 INJECTION, SOLUTION INTRAVENOUS at 12:10

## 2019-10-28 RX ADMIN — Medication 0.2 MCG/KG/MIN: at 05:10

## 2019-10-28 RX ADMIN — Medication 0.8 MCG/KG/MIN: at 04:10

## 2019-10-28 RX ADMIN — Medication 0.01 MCG/KG/MIN: at 05:10

## 2019-10-28 RX ADMIN — ONDANSETRON 8 MG: 8 TABLET, ORALLY DISINTEGRATING ORAL at 07:10

## 2019-10-28 RX ADMIN — LIDOCAINE HYDROCHLORIDE 20 MG: 10 INJECTION, SOLUTION EPIDURAL; INFILTRATION; INTRACAUDAL; PERINEURAL at 04:10

## 2019-10-28 RX ADMIN — PHENOBARBITAL 64.8 MG: 32.4 TABLET ORAL at 08:10

## 2019-10-28 RX ADMIN — OXCARBAZEPINE 300 MG: 150 TABLET, FILM COATED ORAL at 08:10

## 2019-10-28 RX ADMIN — PRAVASTATIN SODIUM 20 MG: 20 TABLET ORAL at 08:10

## 2019-10-28 RX ADMIN — MIRTAZAPINE 7.5 MG: 7.5 TABLET ORAL at 08:10

## 2019-10-28 RX ADMIN — SODIUM CHLORIDE, SODIUM LACTATE, POTASSIUM CHLORIDE, AND CALCIUM CHLORIDE: .6; .31; .03; .02 INJECTION, SOLUTION INTRAVENOUS at 04:10

## 2019-10-28 RX ADMIN — Medication 0.85 MCG/KG/MIN: at 08:10

## 2019-10-28 RX ADMIN — METRONIDAZOLE 500 MG: 500 INJECTION, SOLUTION INTRAVENOUS at 03:10

## 2019-10-28 RX ADMIN — SODIUM CHLORIDE 500 ML: 0.9 INJECTION, SOLUTION INTRAVENOUS at 10:10

## 2019-10-28 RX ADMIN — SODIUM CHLORIDE, SODIUM LACTATE, POTASSIUM CHLORIDE, AND CALCIUM CHLORIDE: .6; .31; .03; .02 INJECTION, SOLUTION INTRAVENOUS at 10:10

## 2019-10-28 RX ADMIN — AZTREONAM 1000 MG: 1 INJECTION, POWDER, LYOPHILIZED, FOR SOLUTION INTRAMUSCULAR; INTRAVENOUS at 11:10

## 2019-10-28 RX ADMIN — LEVOTHYROXINE SODIUM 125 MCG: 25 TABLET ORAL at 08:10

## 2019-10-28 RX ADMIN — ONDANSETRON 4 MG: 2 INJECTION INTRAMUSCULAR; INTRAVENOUS at 12:10

## 2019-10-28 RX ADMIN — SODIUM PHOSPHATE, DIBASIC AND SODIUM PHOSPHATE, MONOBASIC 1 ENEMA: 7; 19 ENEMA RECTAL at 04:10

## 2019-10-28 RX ADMIN — SODIUM PHOSPHATE, DIBASIC AND SODIUM PHOSPHATE, MONOBASIC 1 ENEMA: 7; 19 ENEMA RECTAL at 01:10

## 2019-10-28 RX ADMIN — SODIUM CHLORIDE 1000 ML: 0.9 INJECTION, SOLUTION INTRAVENOUS at 05:10

## 2019-10-28 RX ADMIN — OXCARBAZEPINE 300 MG: 150 TABLET, FILM COATED ORAL at 02:10

## 2019-10-28 NOTE — ANESTHESIA PROCEDURE NOTES
Central Line    Diagnosis: hypotension  Patient location during procedure: ED  Procedure start time: 10/28/2019 4:20 AM  Timeout: 10/28/2019 4:20 AM  Procedure end time: 10/28/2019 4:40 AM    Staffing  Authorizing Provider: Jonathon Worthy MD  Performing Provider: Jonathon Worthy MD    Staffing  Anesthesiologist: Jonathon Worthy MD  Performed: anesthesiologist   Anesthesiologist was present at the time of the procedure.  Preanesthetic Checklist  Completed: patient identified, site marked, surgical consent, pre-op evaluation, timeout performed, IV checked, risks and benefits discussed, monitors and equipment checked and anesthesia consent given  Indication   Indication: vascular access, med administration     Anesthesia   local infiltration    Central Line   Skin Prep: skin prepped with ChloraPrep, skin prep agent completely dried prior to procedure  maximum sterile barriers used during central venous catheter insertion  hand hygiene performed prior to central venous catheter insertion  Location: right, internal jugular.   Catheter type: triple lumen  Catheter Size: 7 Fr  Inserted Catheter Length: 14 cm  Ultrasound: vascular probe with ultrasound  Vessel Caliber: large, patent, compressibility normal  Needle advanced into vessel with real time Ultrasound guidance.  Guidewire confirmed in vessel.  Image recorded and saved.   Manometry: Venous cannualation confirmed by visual estimation of blood vessel pressure using manometry.  Insertion Attempts: 1   Securement:line sutured, chlorhexidine patch, sterile dressing applied and blood return through all ports    Post-Procedure   X-Ray: no pneumothorax on x-ray, placement verified by x-ray and successful placement  Adverse Events:none    Guidewire Guidewire removed intact, verified with nurse.

## 2019-10-28 NOTE — PROGRESS NOTES
[facility]  Progress Note    Patient Name: Annette Garcia  MRN: [id number]  Patient Class: IP- Inpatient   Admission Date: 10/27/2019  Length of Stay: 0 days  Attending Physician: Alice Barker*  Primary Care Provider: Jose Valles MD        Subjective:     Principal Problem:Enteritis        HPI:  Annette Garcia is an 84 year old white woman with peripheral vascular disease, renovascular hypertension, renal artery stenosis status post left renal artery stent placement on 7/19/17, coronary artery disease with history of myocardial infarction, diffuse large B cell lymphoma, anemia, epilepsy, hypothyroidism, chronic hyponatremia, depression, lumbar and sacroiliac joint osteoarthritis with chronic low back pain and history of lumbar spine surgery in 2013, history of appendectomy, history of cholecystectomy history hysterectomy, history of small bowel obstruction status post small bowel resection on 8/23/16. She lives in [place], [place]. Her son has epilepsy. Her daughter has tuberous sclerosis. Her primary care physician is Dr. Jose Torres. Her pain management specialist is Dr. Chirag Bates. Her neurologist is Dr. Gamal Lock.    She presented to [facility] on 10/27/19 with abdominal pain, difficulty urinating, and constipation for one day. She took polyethylene glycol and senna-docusate without relief. She strained during her last attempt at having a bowel movement. She had one episode of vomiting on the day of presentation. She felt weak when walking. In the emergency department, she was found to have acute kidney injury (BUN 27, creatinine 1.6, from 9 and 0.7 on 8/29/19), elevated transaminases ( and , from 16 and 11 on 8/29/19), and lactic acidosis (5.8). Contrast CT showed acute enteritis, sigmoid and rectal constipation, and some peritoneal free fluid in the pericolic gutters.she was given lactated  ringers, cefepime, and metronidazole. She required norepinephrine for septic shock. She was admitted to [facility].    Overview/Hospital Course:  No notes on file    Interval History: awake and alert, requesting to eat. Reported 3 BM after the enema- FU with KUB.   Start liquid diet  continue IVF for hypotension, JAS, and hyponatremia  Continue Levophed for hypotension    Updated patient and family at bedside.,   At baseline patient is debbie to perform all ADLs.     Review of Systems   Constitutional: Positive for fatigue. Negative for appetite change and fever.   Eyes: Negative for visual disturbance.   Respiratory: Negative for choking and shortness of breath.    Cardiovascular: Negative for chest pain and palpitations.   Gastrointestinal: Positive for diarrhea. Negative for abdominal distention, abdominal pain, blood in stool, constipation, nausea and vomiting.   Genitourinary: Negative for difficulty urinating.   Musculoskeletal: Negative for arthralgias and gait problem.   Skin: Negative for color change and rash.   Neurological: Positive for weakness. Negative for facial asymmetry and speech difficulty.   Psychiatric/Behavioral: Negative for agitation.     Objective:     Vital Signs (Most Recent):  Temp: 98.3 °F (36.8 °C) (10/28/19 1100)  Pulse: 75 (10/28/19 1200)  Resp: 16 (10/28/19 1200)  BP: (!) 89/51 (10/28/19 1200)  SpO2: 96 % (10/28/19 1200) Vital Signs (24h Range):  Temp:  [97 °F (36.1 °C)-98.3 °F (36.8 °C)] 98.3 °F (36.8 °C)  Pulse:  [64-79] 75  Resp:  [15-35] 16  SpO2:  [88 %-100 %] 96 %  BP: ()/(27-66) 89/51     Weight: 65.2 kg (143 lb 11.8 oz)  Body mass index is 28.07 kg/m².    Intake/Output Summary (Last 24 hours) at 10/28/2019 1224  Last data filed at 10/28/2019 1200  Gross per 24 hour   Intake 2658.17 ml   Output 83 ml   Net 2575.17 ml      Physical Exam   Constitutional: No distress.   HENT:   Head: Normocephalic and atraumatic.   Neck: Neck supple.   Cardiovascular: Normal  rate and regular rhythm.   Pulmonary/Chest: Effort normal and breath sounds normal. No respiratory distress.   Abdominal: Soft. Bowel sounds are normal. She exhibits no distension. There is no tenderness. There is no guarding.   Musculoskeletal: Normal range of motion.   Neurological: She is alert.   Skin: Skin is warm and dry. Capillary refill takes less than 2 seconds. She is not diaphoretic.   Psychiatric: She has a normal mood and affect.       Significant Labs:   Blood Culture: No results for input(s): LABBLOO in the last 48 hours.  CBC:   Recent Labs   Lab 10/27/19  2207   WBC 5.82   HGB 15.1   HCT 44.8        CMP:   Recent Labs   Lab 10/28/19  0046   *   K 4.6   CL 95   CO2 21*   *   BUN 27*   CREATININE 1.6*   CALCIUM 10.1   PROT 6.1   ALBUMIN 3.6   BILITOT 1.1*   ALKPHOS 57   *   *   ANIONGAP 15   EGFRNONAA 30*     Troponin: No results for input(s): TROPONINI in the last 48 hours.  TSH:   Recent Labs   Lab 08/19/19  1009   TSH 1.580     Urine Culture: No results for input(s): LABURIN in the last 48 hours.  Urine Studies:   Recent Labs   Lab 10/28/19  0410   COLORU Yellow   APPEARANCEUA Clear   PHUR 8.0   SPECGRAV 1.015   PROTEINUA Trace*   GLUCUA Negative   KETONESU Negative   BILIRUBINUA Negative   OCCULTUA Negative   NITRITE Negative   UROBILINOGEN Negative   LEUKOCYTESUR Negative       Significant Imaging: I have reviewed all pertinent imaging results/findings within the past 24 hours.      Assessment/Plan:      * Enteritis  Ct abdomen with enteritis and stool burden   continue Flagyl and flagyl  Blood cx pending   Monitor trend lactic acid         Slow transit constipation  CT abdomen -Prominent retained stool seen throughout the colon with large volume stool seen within the sigmoid colon and rectum    Peripheral vascular disease, unspecified  Hyperlipidemia     Pravastatin 20 mg po daily. Continue      Old MI (myocardial infarction)  Noted, Troponin wnl      Abnormal  liver enzymes  ALT (361), AST (487)  Monitor electrolytes        Lactic acidosis  Hypotension    Admit to ICU  Lactic acid (6.2)  Oxygen as needed to keep sats >92%  NS 125ml/hr  Norepinephrine infusion 2mcg/kg/min          Depression  Mirtazapine 7.5mg po QHS      Anemia due to antineoplastic chemotherapy  H&H stable  CBC daily    DLBCL (diffuse large B cell lymphoma)  Noted      Acquired hypothyroidism  Levothyroxine 125 mcg po before breakfast      Epilepsy  Trileptal 300 mg po nightly  Phenobarbital 64.8 mg po nightly      Renovascular hypertension  Hold Coreg and Lisinopril for now         VTE Risk Mitigation (From admission, onward)         Ordered     IP VTE HIGH RISK PATIENT  Once      10/28/19 0607     Place sequential compression device  Until discontinued      10/28/19 0607                Critical care time spent on the evaluation and treatment of severe organ dysfunction, review of pertinent labs and imaging studies, discussions with consulting providers and discussions with patient/family: 45 minutes.      Alice Barker MD  Department of Hospital Medicine   [facility]

## 2019-10-28 NOTE — CONSULTS
Patient ID: Annette Garcia is a 84 y.o. female.    Chief Complaint: Abdominal Pain (reoprts abd pain since last night. reports constipation, last BM today, states was a smear. vomiting X2-3 times. lives at home alone. )      HPI:  84F with abdominal pain for several days. Hx of constipation. No BM for several days but some loose stool prior to that. CT done in ER last night showed severe dilated colon filled with stool but no free air. Stomach was also dilated. She took some imodium a few days ago. She was admitted by the medicine team to the ICU. Also with mild JAS. NG was placed and she feels slightly better. She has had several enemas now with a good amount of hard stool. Remains afebrile. Lactate trending down, WBC remains normal.       Review of Systems   Constitutional: Negative for fever.   HENT: Negative for trouble swallowing.    Respiratory: Negative for shortness of breath.    Cardiovascular: Negative for chest pain.   Gastrointestinal: Positive for abdominal distention, abdominal pain and nausea. Negative for blood in stool and vomiting.   Genitourinary: Negative for dysuria.   Musculoskeletal: Negative for gait problem.   Skin: Negative for rash and wound.   Allergic/Immunologic: Negative for immunocompromised state.   Neurological: Negative for weakness.   Hematological: Does not bruise/bleed easily.   Psychiatric/Behavioral: Negative for agitation.       Current Facility-Administered Medications   Medication Dose Route Frequency Provider Last Rate Last Dose    acetaminophen tablet 650 mg  650 mg Oral Q4H PRN Neda Goldberg, MICHELL        albuterol-ipratropium 2.5 mg-0.5 mg/3 mL nebulizer solution 3 mL  3 mL Nebulization Q6H PRN Neda Goldberg, NP        lactated ringers infusion   Intravenous Continuous Shaye Vale  mL/hr at 10/28/19 1645      levothyroxine tablet 125 mcg  125 mcg Oral Before breakfast Neda Goldberg NP   125 mcg at 10/28/19 0815    mirtazapine tablet 7.5 mg  7.5 mg Oral  QHS Neda Goldberg, MICHELL        norepinephrine bitartrate-D5W 16 mg/250 mL (64 mcg/mL) infusion  0.05 mcg/kg/min Intravenous Continuous Felix Ghosh MD 50.6 mL/hr at 10/28/19 1720 0.85 mcg/kg/min at 10/28/19 1720    ondansetron disintegrating tablet 8 mg  8 mg Oral Q8H PRN Neda Goldberg NP   8 mg at 10/28/19 0719    OXcarbazepine tablet 300 mg  300 mg Oral QHS Kevin Madsen Jr., MD   300 mg at 10/28/19 0205    PHENobarbital tablet 64.8 mg  64.8 mg Oral Nightly Kevin Madsen Jr., MD   64.8 mg at 10/28/19 0215    pravastatin tablet 20 mg  20 mg Oral Daily Neda Goldberg NP   20 mg at 10/28/19 0845    promethazine (PHENERGAN) 6.25 mg in dextrose 5 % 50 mL IVPB  6.25 mg Intravenous Q6H PRN Neda Goldberg NP        ramelteon tablet 8 mg  8 mg Oral Nightly PRN Neda Goldberg NP        sodium chloride 0.9% flush 10 mL  10 mL Intravenous PRN Neda Goldberg NP           Review of patient's allergies indicates:   Allergen Reactions    Adhesive Itching and Blisters    Penicillins Anaphylaxis    Tramadol Hives    Avelox [moxifloxacin] Rash     Facial and arm itching and redness. Pt states throat closes when given.    Amoxil [amoxicillin]     Aspridrox [aspirin, buffered]     Codeine Other (See Comments)     Throat swelling    Keflex [cephalexin]     Norvasc [amlodipine]     Red dye Hives    Robitussin [guaifenesin]     Sulfa (sulfonamide antibiotics)     Tylenol [acetaminophen]      Has reaction to Tylenol with red dye and unable to take Extra Strength Tylenol/ CAN ONLY TOLERATE REG STRENGTH TYLENOL    Vicks vaporub [camphor-eucalyptus oil-menthol]        Past Medical History:   Diagnosis Date    Cancer     colon    Hypertension     Petit mal epilepsy 1954    Scoliosis of lumbar spine     Seizures     Unspecified hypothyroidism        Past Surgical History:   Procedure Laterality Date    APPENDECTOMY      BACK SURGERY  1988    vertebral fracture    BACK SURGERY  02/2013    lumbar L2-5     CATARACT EXTRACTION, BILATERAL Bilateral     CHOLECYSTECTOMY      open    EYE SURGERY Bilateral     cataract removal with lens implant    HYSTERECTOMY      PORTACATH PLACEMENT Right 09/2016    RENAL ARTERY STENT Left 07/19/2017    SMALL INTESTINE SURGERY  08/23/2016    TONSILLECTOMY      VAGINAL HYSTERECTOMY W/ ANTERIOR AND POSTERIOR VAGINAL REPAIR         Family History   Problem Relation Age of Onset    Pancreatic cancer Mother     Hypertension Father     Heart attack Father        Social History     Socioeconomic History    Marital status:      Spouse name: Not on file    Number of children: Not on file    Years of education: Not on file    Highest education level: Not on file   Occupational History    Not on file   Social Needs    Financial resource strain: Not on file    Food insecurity:     Worry: Not on file     Inability: Not on file    Transportation needs:     Medical: Not on file     Non-medical: Not on file   Tobacco Use    Smoking status: Never Smoker    Smokeless tobacco: Never Used   Substance and Sexual Activity    Alcohol use: No    Drug use: No    Sexual activity: Not on file   Lifestyle    Physical activity:     Days per week: Not on file     Minutes per session: Not on file    Stress: Not on file   Relationships    Social connections:     Talks on phone: Not on file     Gets together: Not on file     Attends Yarsani service: Not on file     Active member of club or organization: Not on file     Attends meetings of clubs or organizations: Not on file     Relationship status: Not on file   Other Topics Concern    Not on file   Social History Narrative    Not on file       Vitals:    10/28/19 1730   BP: (!) 90/55   Pulse: 78   Resp: (!) 35   Temp:        Physical Exam   Constitutional: She is oriented to person, place, and time. She appears well-developed and well-nourished. No distress.   HENT:   Head: Normocephalic and atraumatic.   Eyes: No scleral  icterus.   Cardiovascular: Normal rate.   Pulmonary/Chest: Effort normal. No stridor.   Abdominal: Soft. She exhibits distension. There is tenderness.   Some TTP, worse right side. Abdomen not rigid.    Lymphadenopathy:     She has no cervical adenopathy.   Neurological: She is alert and oriented to person, place, and time.   Skin: Skin is warm. No erythema.   Psychiatric: She has a normal mood and affect. Her behavior is normal.     WBC 5 from 5 last night  Hg 12 from 15  Cr 2.4 from 2.3 earlier today. Baseline normal  Lactated 3.9 from 6.2 early this morning  CT last night shows hugely dilated colon, large amount of stool in rectum and colon. No free air or pneumatosis  AXR about noon today also did not show any pneumoperitoneum    Assessment & Plan:   84F with large bowel obstruction from stool, chronic constipation  Recommend NG tube placement to aid decompression  Repeat enemas  Trending LA, WBC  nephrology following for JAS.   Ok for ice chips  Hx of multiple operations, ex lap for adhesive bowel obstruction. No evidence of perforation as of right now. Will continue to monitor.

## 2019-10-28 NOTE — PT/OT/SLP PROGRESS
Occupational Therapy  [facility]    Patient Name:  Annette Garcia   MRN:  [id number]    Patient not seen today secondary to  BP 87/57 on pressors at this time. Will follow-up .    Ronnie Sutton OT  10/28/2019

## 2019-10-28 NOTE — ED TRIAGE NOTES
Pt presented to ED via ambulance with c/o abdominal pain. Pt states he has been constipated x 4 days and has had n/v and abdominal pain for last 2 days today being the worst prompting her to come to ED. Pt abdominal pain 9/10, distended and tender to touch, pt states she has been vomiting and unable to eat. , pt denies any chest pain , sob , or ha. Pt has history of htn, seizures, cancer and hypothyroidism.

## 2019-10-28 NOTE — ASSESSMENT & PLAN NOTE
Hypotension    Admit to ICU  Lactic acid (6.2)  Oxygen as needed to keep sats >92%  NS 125ml/hr  Norepinephrine infusion 2mcg/kg/min

## 2019-10-28 NOTE — ASSESSMENT & PLAN NOTE
CT abdomen -Prominent retained stool seen throughout the colon with large volume stool seen within the sigmoid colon and rectum

## 2019-10-28 NOTE — CONSULTS
NEPHROLOGY CONSULT NOTE    HPI & INTERVAL HISTORY:    Past Medical History:   Diagnosis Date    Cancer     colon    Hypertension     Petit mal epilepsy 1954    Scoliosis of lumbar spine     Seizures     Unspecified hypothyroidism       Past Surgical History:   Procedure Laterality Date    APPENDECTOMY      BACK SURGERY  1988    vertebral fracture    BACK SURGERY  02/2013    lumbar L2-5    CATARACT EXTRACTION, BILATERAL Bilateral     CHOLECYSTECTOMY      open    EYE SURGERY Bilateral     cataract removal with lens implant    HYSTERECTOMY      PORTACATH PLACEMENT Right 09/2016    RENAL ARTERY STENT Left 07/19/2017    SMALL INTESTINE SURGERY  08/23/2016    TONSILLECTOMY      VAGINAL HYSTERECTOMY W/ ANTERIOR AND POSTERIOR VAGINAL REPAIR        Review of patient's allergies indicates:   Allergen Reactions    Adhesive Itching and Blisters    Penicillins Anaphylaxis    Tramadol Hives    Avelox [moxifloxacin] Rash     Facial and arm itching and redness. Pt states throat closes when given.    Amoxil [amoxicillin]     Aspridrox [aspirin, buffered]     Codeine Other (See Comments)     Throat swelling    Keflex [cephalexin]     Norvasc [amlodipine]     Red dye Hives    Robitussin [guaifenesin]     Sulfa (sulfonamide antibiotics)     Tylenol [acetaminophen]      Has reaction to Tylenol with red dye and unable to take Extra Strength Tylenol/ CAN ONLY TOLERATE REG STRENGTH TYLENOL    Vicks vaporub [camphor-eucalyptus oil-menthol]       Medications Prior to Admission   Medication Sig Dispense Refill Last Dose    acetaminophen (TYLENOL) 325 MG tablet Take 2 tablets (650 mg total) by mouth every 4 (four) hours as needed for Pain.  0 Taking    calcium carbonate (OS-RICHARDSON) 600 mg calcium (1,500 mg) Tab Take 600 mg by mouth once.   Taking    carvedilol (COREG) 25 MG tablet Take 1 tablet (25 mg total) by mouth 2 (two) times daily with meals. 180 tablet 3 Taking    cloNIDine (CATAPRES) 0.2 MG tablet Take  1 tablet (0.2 mg total) by mouth 2 (two) times daily. 180 tablet 3 Taking    denosumab (PROLIA) 60 mg/mL Syrg Inject 60 mg into the skin every 6 (six) months.   Taking    levothyroxine (SYNTHROID) 125 MCG tablet TAKE 1 TABLET (125 MCG TOTAL) BY MOUTH ONCE DAILY. 90 tablet 3 Taking    lisinopril (PRINIVIL,ZESTRIL) 40 MG tablet Take 1 tablet (40 mg total) by mouth once daily. 90 tablet 3 Taking    magnesium 250 mg Tab Take by mouth once.   Taking    mirtazapine (REMERON) 7.5 MG Tab Take 1 tablet (7.5 mg total) by mouth every evening. (Patient not taking: Reported on 10/18/2019) 90 tablet 3 Not Taking    ondansetron (ZOFRAN) 4 MG tablet Take 1 tablet (4 mg total) by mouth every 8 (eight) hours as needed for Nausea. 25 tablet 2     OXcarbazepine (TRILEPTAL) 300 MG Tab Take 1 tablet (300 mg total) by mouth nightly. 90 tablet 3 Taking    oxybutynin (DITROPAN) 5 MG Tab One po every 6 hours as need for bladder irritation 45 tablet 11 Taking    PHENobarbital (LUMINAL) 64.8 MG tablet TAKE 1 TABLET (64.8 MG TOTAL) BY MOUTH EVERY EVENING. 90 tablet 5 Taking    polyethylene glycol (GLYCOLAX) 17 gram PwPk Take 17 g by mouth 2 (two) times daily as needed (Constipation).  0 Taking    pravastatin (PRAVACHOL) 20 MG tablet Take 1 tablet (20 mg total) by mouth once daily. 90 tablet 3 Taking    SHINGRIX, PF, 50 mcg/0.5 mL injection        triamcinolone acetonide 0.1% (KENALOG) 0.1 % cream Apply topically 2 (two) times daily. When needed for sores on her head 45 g 3     vitamin D (VITAMIN D3) 1000 units Tab Take 1,000 Units by mouth once daily.   Taking       Social History     Socioeconomic History    Marital status:      Spouse name: Not on file    Number of children: Not on file    Years of education: Not on file    Highest education level: Not on file   Occupational History    Not on file   Social Needs    Financial resource strain: Not on file    Food insecurity:     Worry: Not on file     Inability: Not  on file    Transportation needs:     Medical: Not on file     Non-medical: Not on file   Tobacco Use    Smoking status: Never Smoker    Smokeless tobacco: Never Used   Substance and Sexual Activity    Alcohol use: No    Drug use: No    Sexual activity: Not on file   Lifestyle    Physical activity:     Days per week: Not on file     Minutes per session: Not on file    Stress: Not on file   Relationships    Social connections:     Talks on phone: Not on file     Gets together: Not on file     Attends Church service: Not on file     Active member of club or organization: Not on file     Attends meetings of clubs or organizations: Not on file     Relationship status: Not on file   Other Topics Concern    Not on file   Social History Narrative    Not on file        MEDS   levothyroxine  125 mcg Oral Before breakfast    mirtazapine  7.5 mg Oral QHS    OXcarbazepine  300 mg Oral QHS    PHENobarbital  64.8 mg Oral Nightly    pravastatin  20 mg Oral Daily        ROS:          CONTINOUS INFUSIONS:      Intake/Output Summary (Last 24 hours) at 10/28/2019 1528  Last data filed at 10/28/2019 1400  Gross per 24 hour   Intake 2658.17 ml   Output 88 ml   Net 2570.17 ml        HEMODYNAMICS:    Temp:  [97 °F (36.1 °C)-98.3 °F (36.8 °C)] 98.3 °F (36.8 °C)  Pulse:  [64-79] 78  Resp:  [15-40] 30  SpO2:  [88 %-100 %] 96 %  BP: ()/(27-66) 94/55      C/o Abdominal pain  Nausea  Vomiting   Decreased intake  Weak  No fever  No chills  No SOB  No cough  No CP  Cards: Pulse 64  Pul: diminished breath sounds  Abdomen distended, tender   Ext: no edema   Skin: dry   LABS   Lab Results   Component Value Date    WBC 5.82 10/27/2019    HGB 15.1 10/27/2019    HCT 44.8 10/27/2019     (H) 10/27/2019     10/27/2019        Recent Labs   Lab 10/28/19  0046 10/28/19  1351   * 127*   CALCIUM 10.1 8.9   ALBUMIN 3.6  --    PROT 6.1  --    * 131*   K 4.6 4.6   CO2 21* 23   CL 95 97   BUN 27* 38*   CREATININE  1.6* 2.3*   ALKPHOS 57  --    *  --    *  --    BILITOT 1.1*  --       Lab Results   Component Value Date    CALCIUM 8.9 10/28/2019    PHOS 3.5 03/13/2017      Lab Results   Component Value Date    IRON 73 12/27/2016    TIBC 311 12/27/2016    FERRITIN 219 12/27/2016        ABG  No results for input(s): PH, PO2, PCO2, HCO3, BE in the last 168 hours.      IMAGING:  CXR    ASSESSMENT / PLAN  JAS  UO 88 cc today  UA protein trace  Creatinine 0.7 on 8/29/19  Creatinine 2.3 , BUN 38 today  UA trace protein  Hyponatremia  Na 131  Albumin 3.6  Hb 15  Lactate 4.1      Hypotension  Blood pressure 94/55  2016    1 - Normal left ventricular systolic function (EF 60-65%).     2 - Normal right ventricular systolic function .     3 - The estimated PA systolic pressure is 18 mmHg.     4 - Grade 1 diastolic dysfunction .   XR abdomen         1. Diffuse moderate/severe dilatation of the distal half of the colon and rectum which are air and stool-filled without evidence of small bowel a dilatation which could correlate with the clinical diagnosis of rectal fecal impaction and constipation.  No evidence for pneumatosis or secondary signs of pneumoperitoneum.   CT  1. Long segment abnormal small bowel wall thickening with increased mucosal enhancement consistent with acute enteritis.  No evidence of abnormal small bowel dilatation or obstruction.  2. Prominent retained stool seen throughout the colon with large volume stool seen within the sigmoid colon and rectum.  Correlate for possible constipation and fecal impaction.  3. Small volume peritoneal free fluid seen along the bilateral pericolic gutters.         CXR  Right-sided central venous catheters are noted distal tip superior at the level of the expected location of the SVC.    Diminished depth of inspiration and chronic change noted without additional evidence for superimposed acute intrathoracic process.     Discussed with surgeon.  NG tube  placement.  Enema.  Avoid nephrotoxic agents, hypotension, hypovolemia.  I and O .  Weight daily.  Continue IVF.  Will follow up.  Discussed with family.

## 2019-10-28 NOTE — ED NOTES
APPEARANCE: Alert, oriented and in no acute distress.  CARDIAC: Normal rate and rhythm, no murmur heard.   PERIPHERAL VASCULAR: peripheral pulses present. Normal cap refill. No edema. Warm to touch.    RESPIRATORY:Normal rate and effort, breath sounds clear bilaterally throughout chest. Respirations are equal and unlabored no obvious signs of distress..  MUSC: Full ROM. No bony tenderness or soft tissue tenderness. No obvious deformity.  SKIN: Skin is warm and dry, normal skin turgor, mucous membranes moist.  NEURO: 5/5 strength major flexors/extensors bilaterally. Sensory intact to light touch bilaterally. Nola coma scale: eyes open spontaneously-4, oriented & converses-5, obeys commands-6. No neurological abnormalities.   MENTAL STATUS: awake, alert and aware of environment.  EYE: PERRL, both eyes: pupils brisk and reactive to light. Normal size.  ENT: EARS: no obvious drainage. NOSE: no active bleeding.

## 2019-10-28 NOTE — SUBJECTIVE & OBJECTIVE
Past Medical History:   Diagnosis Date    Cancer     colon    Hypertension     Petit mal epilepsy 1954    Scoliosis of lumbar spine     Seizures     Unspecified hypothyroidism        Past Surgical History:   Procedure Laterality Date    APPENDECTOMY      BACK SURGERY  1988    vertebral fracture    BACK SURGERY  02/2013    lumbar L2-5    CATARACT EXTRACTION, BILATERAL Bilateral     CHOLECYSTECTOMY      open    COLON SURGERY      EYE SURGERY Bilateral     cataract removal with lens implant    HYSTERECTOMY      PORTACATH PLACEMENT Right 09/2016    TONSILLECTOMY      VAGINAL HYSTERECTOMY W/ ANTERIOR AND POSTERIOR VAGINAL REPAIR         Review of patient's allergies indicates:   Allergen Reactions    Adhesive Itching and Blisters    Penicillins Anaphylaxis    Tramadol Hives    Avelox [moxifloxacin] Rash     Facial and arm itching and redness. Pt states throat closes when given.    Amoxil [amoxicillin]     Aspridrox [aspirin, buffered]     Codeine Other (See Comments)     Throat swelling    Keflex [cephalexin]     Norvasc [amlodipine]     Red dye Hives    Robitussin [guaifenesin]     Sulfa (sulfonamide antibiotics)     Tylenol [acetaminophen]      Has reaction to Tylenol with red dye and unable to take Extra Strength Tylenol/ CAN ONLY TOLERATE REG STRENGTH TYLENOL    Vicks vaporub [camphor-eucalyptus oil-menthol]        No current facility-administered medications on file prior to encounter.      Current Outpatient Medications on File Prior to Encounter   Medication Sig    acetaminophen (TYLENOL) 325 MG tablet Take 2 tablets (650 mg total) by mouth every 4 (four) hours as needed for Pain.    calcium carbonate (OS-RICHARDSON) 600 mg calcium (1,500 mg) Tab Take 600 mg by mouth once.    carvedilol (COREG) 25 MG tablet Take 1 tablet (25 mg total) by mouth 2 (two) times daily with meals.    cloNIDine (CATAPRES) 0.2 MG tablet Take 1 tablet (0.2 mg total) by mouth 2 (two) times daily.    denosumab  (PROLIA) 60 mg/mL Syrg Inject 60 mg into the skin every 6 (six) months.    levothyroxine (SYNTHROID) 125 MCG tablet TAKE 1 TABLET (125 MCG TOTAL) BY MOUTH ONCE DAILY.    lisinopril (PRINIVIL,ZESTRIL) 40 MG tablet Take 1 tablet (40 mg total) by mouth once daily.    magnesium 250 mg Tab Take by mouth once.    mirtazapine (REMERON) 7.5 MG Tab Take 1 tablet (7.5 mg total) by mouth every evening. (Patient not taking: Reported on 10/18/2019)    ondansetron (ZOFRAN) 4 MG tablet Take 1 tablet (4 mg total) by mouth every 8 (eight) hours as needed for Nausea.    OXcarbazepine (TRILEPTAL) 300 MG Tab Take 1 tablet (300 mg total) by mouth nightly.    oxybutynin (DITROPAN) 5 MG Tab One po every 6 hours as need for bladder irritation    PHENobarbital (LUMINAL) 64.8 MG tablet TAKE 1 TABLET (64.8 MG TOTAL) BY MOUTH EVERY EVENING.    polyethylene glycol (GLYCOLAX) 17 gram PwPk Take 17 g by mouth 2 (two) times daily as needed (Constipation).    pravastatin (PRAVACHOL) 20 MG tablet Take 1 tablet (20 mg total) by mouth once daily.    SHINGRIX, PF, 50 mcg/0.5 mL injection     triamcinolone acetonide 0.1% (KENALOG) 0.1 % cream Apply topically 2 (two) times daily. When needed for sores on her head    vitamin D (VITAMIN D3) 1000 units Tab Take 1,000 Units by mouth once daily.     Family History     Problem Relation (Age of Onset)    Heart attack Father    Hypertension Father    Pancreatic cancer Mother        Tobacco Use    Smoking status: Never Smoker    Smokeless tobacco: Never Used   Substance and Sexual Activity    Alcohol use: No    Drug use: No    Sexual activity: Not on file     Review of Systems   Constitutional: Positive for appetite change and fatigue. Negative for fever.   HENT: Negative for congestion, dental problem, postnasal drip and sinus pressure.    Eyes: Negative for redness and visual disturbance.   Respiratory: Negative for cough, choking and shortness of breath.    Cardiovascular: Negative for chest  pain and palpitations.   Gastrointestinal: Positive for abdominal pain, constipation, nausea and vomiting. Negative for abdominal distention, blood in stool and diarrhea.   Endocrine: Negative for polydipsia and polyuria.   Genitourinary: Positive for difficulty urinating. Negative for flank pain and hematuria.   Musculoskeletal: Negative for arthralgias and gait problem.   Skin: Negative for color change and rash.   Allergic/Immunologic: Negative for food allergies.   Neurological: Positive for weakness. Negative for dizziness, facial asymmetry and speech difficulty.   Psychiatric/Behavioral: Negative for agitation.     Objective:     Vital Signs (Most Recent):  Temp: 97 °F (36.1 °C) (10/28/19 0224)  Pulse: 73 (10/28/19 0541)  Resp: (!) 27 (10/28/19 0537)  BP: (!) 117/58 (10/28/19 0541)  SpO2: 97 % (10/28/19 0537) Vital Signs (24h Range):  Temp:  [97 °F (36.1 °C)-97.9 °F (36.6 °C)] 97 °F (36.1 °C)  Pulse:  [64-73] 73  Resp:  [20-31] 27  SpO2:  [88 %-100 %] 97 %  BP: ()/(27-66) 117/58     Weight: 63.5 kg (140 lb)  Body mass index is 29.77 kg/m².    Physical Exam   Constitutional: No distress.   HENT:   Head: Normocephalic and atraumatic.   Eyes: Pupils are equal, round, and reactive to light.   Neck: Normal range of motion. No JVD present.   Cardiovascular: Normal rate and regular rhythm.   Pulmonary/Chest: Effort normal and breath sounds normal. No respiratory distress.   Abdominal: She exhibits distension. There is tenderness. There is guarding.   Decreased bowel sounds X 4 quadrants   Musculoskeletal: Normal range of motion.   Neurological: She is alert.   Skin: Skin is warm and dry. Capillary refill takes less than 2 seconds. She is not diaphoretic.   Psychiatric: She has a normal mood and affect.         CRANIAL NERVES     CN III, IV, VI   Pupils are equal, round, and reactive to light.       Significant Labs:   Bilirubin:   Recent Labs   Lab 10/28/19  0046   BILITOT 1.1*     BMP:   Recent Labs   Lab  10/28/19  0046   *   *   K 4.6   CL 95   CO2 21*   BUN 27*   CREATININE 1.6*   CALCIUM 10.1     CBC:   Recent Labs   Lab 10/27/19  2207   WBC 5.82   HGB 15.1   HCT 44.8        CMP:   Recent Labs   Lab 10/28/19  0046   *   K 4.6   CL 95   CO2 21*   *   BUN 27*   CREATININE 1.6*   CALCIUM 10.1   PROT 6.1   ALBUMIN 3.6   BILITOT 1.1*   ALKPHOS 57   *   *   ANIONGAP 15   EGFRNONAA 30*     Lactic Acid:   Recent Labs   Lab 10/27/19  2350 10/28/19  0410   LACTATE 5.8* 6.2*     TSH:   Recent Labs   Lab 08/19/19  1009   TSH 1.580     Urine Studies:   Recent Labs   Lab 10/28/19  0410   COLORU Yellow   APPEARANCEUA Clear   PHUR 8.0   SPECGRAV 1.015   PROTEINUA Trace*   GLUCUA Negative   KETONESU Negative   BILIRUBINUA Negative   OCCULTUA Negative   NITRITE Negative   UROBILINOGEN Negative   LEUKOCYTESUR Negative     All pertinent labs within the past 24 hours have been reviewed.    Significant Imaging: I have reviewed all pertinent imaging results/findings within the past 24 hours.     Imaging Results          X-Ray Chest 1 View (Final result)  Result time 10/28/19 05:34:04    Final result by Parker Andre MD (10/28/19 05:34:04)                 Impression:      Right-sided central venous catheters are noted distal tip superior at the level of the expected location of the SVC.    Diminished depth of inspiration and chronic change noted without additional evidence for superimposed acute intrathoracic process.      Electronically signed by: Parker Andre  Date:    10/28/2019  Time:    05:34             Narrative:    EXAMINATION:  XR CHEST 1 VIEW    CLINICAL HISTORY:  central line placement;    TECHNIQUE:  Single frontal view of the chest was performed.    COMPARISON:  August 29, 2019    FINDINGS:  Single portable chest view is submitted.  Right-sided subcutaneous central venous catheter again noted, in the interim a right-sided internal jugular central venous catheter are  appears to have been placed, the distal tip of both of these appears at the expected location of the SVC.  There is diminished depth of inspiration and mild rotation when accounting for this the cardiomediastinal silhouette appears stable.  Accentuated pulmonary bronchovascular markings consistent with diminished depth of inspiration noted.  There is no evidence for confluent infiltrate or consolidation, significant pleural effusion or pneumothorax.  The osseous structures demonstrate chronic change including prominent chronic appearance of the right humeral head, and vertebral body height loss along the distal thoracic spine.                               CT Abdomen Pelvis With Contrast (Final result)  Result time 10/27/19 23:37:51    Final result by Carolina Harvey MD (10/27/19 23:37:51)                 Impression:      1. Long segment abnormal small bowel wall thickening with increased mucosal enhancement consistent with acute enteritis.  No evidence of abnormal small bowel dilatation or obstruction.  2. Prominent retained stool seen throughout the colon with large volume stool seen within the sigmoid colon and rectum.  Correlate for possible constipation and fecal impaction.  3. Small volume peritoneal free fluid seen along the bilateral pericolic gutters.  4. Postsurgical changes and multiple additional findings as detailed above.      Electronically signed by: Carolina Harvey MD  Date:    10/27/2019  Time:    23:37             Narrative:    EXAMINATION:  CT ABDOMEN PELVIS WITH CONTRAST    CLINICAL HISTORY:  Bowel obstruction, high-grade;    TECHNIQUE:  Low dose axial images, sagittal and coronal reformations were obtained from the lung bases to the pubic symphysis following the IV administration of 75 mL of Omnipaque 350 .  Oral contrast was not given.    COMPARISON:  CT chest abdomen and pelvis from August 2018.    FINDINGS:  The visualized portion of the heart is unremarkable.  Stable clustering of nodules  is seen within the right lung base.  Lung bases show no evidence of focal consolidation or pleural effusion.    No significant hepatic abnormalities are identified.  There is minimal intrahepatic biliary ductal dilatation.  Gallbladder has been removed.  Stomach is distended with air-fluid level seen.  Fluid is seen within the distal esophagus which may relate to gastroesophageal reflux.    Kidneys enhance normally with no evidence of hydronephrosis.  Urinary bladder is unremarkable.  Uterus has been removed.    Postsurgical dural changes of partial bowel resection are seen.  There is long segment of abnormal small bowel wall thickening with increased mucosal enhancement consistent with acute enteritis.  No surrounding inflammatory changes are seen.  No evidence of abnormal small bowel dilatation or obstruction.  Appendix is not visualized and may have been removed.  Prominent stool is seen within the colon with large volume retained stool seen within the distal sigmoid colon and rectum.  No free air identified.  Small volume peritoneal fluid is seen along the bilateral pericolic gutters.    Aorta tapers normally with prominent atherosclerosis.  Left renal artery stent is seen.    No acute osseous abnormality identified.  Multilevel degenerative changes are seen within the lumbar spine with lower lumbar laminectomies seen.  Remote compression fractures are seen of the T11 and L2 vertebral bodies.  Subcutaneous soft tissue structures are unremarkable.

## 2019-10-28 NOTE — PT/OT/SLP EVAL
Physical Therapy Evaluation    Patient Name:  Annette Garcia   MRN:  [id number]    Recommendations:     Discharge Recommendations:    TBD  Discharge Equipment Recommendations:   TBD  Barriers to discharge: decreased functional mobility    Assessment:     Annette Garcia is a 84 y.o. female admitted with a medical diagnosis of Enteritis.  She presents with the following impairments/functional limitations:  weakness, impaired functional mobilty, impaired balance, impaired cardiopulmonary response to activity, impaired endurance, impaired self care skills, gait instability, decreased lower extremity function, pain.  Pt requires max assist to roll bilaterally and Max assist for supine to/from sit, limited by belly pain so did not stay in sitting.    Rehab Prognosis: Fair; patient would benefit from acute skilled PT services to address these deficits and reach maximum level of function.    Recent Surgery: * No surgery found *      Plan:     During this hospitalization, patient to be seen 6 x/week to address the identified rehab impairments via gait training, therapeutic activities, therapeutic exercises, neuromuscular re-education, wheelchair management/training and progress toward the following goals:    · Plan of Care Expires:  11/28/19    Subjective     Chief Complaint: tired, not feeling well  Patient/Family Comments/goals: rest  Pain/Comfort:  · Pain Rating 1: (Pt c/o pain in her belly with supine to/from sit, not rated)  · Pain Rating Post-Intervention 1: 0/10    Patients cultural, spiritual, Latter day conflicts given the current situation: no    Living Environment:  Pt was living alone in a [facility] with 2 steps.  Daughters took her to MD appts.    Prior to admission, patients level of function was Independent ambulation in house without AD and out of house with Rollator.  Equipment used at home: wheelchair, walker, rolling, shower chair, bedside commode, cane, straight, tub bench.  DME owned (not currently used):  none.  Upon discharge, patient will have assistance from daughters.    Objective:     Communicated with nurse prior to session.  Patient found HOB elevated with peripheral IV, telemetry, pulse ox (continuous), gonzales catheter, blood pressure cuff  upon PT entry to room.    General Precautions: Standard, fall   Orthopedic Precautions:    Braces:       Exams:  · Pt is grossly oriented x 4.  Pt is limited to 0 degrees DF and lacks 5 degrees of knee extension bilaterally     Functional Mobility:    Pt requires max assist to roll bilaterally and Max assist for supine to/from sit, limited by belly pain so did not stay in sitting.  Pt required Max assist of 2 to scoot to HOB.    Therapeutic Activities and Exercises:   Pt requested waiting until tomorrow for further therapy.  AM-PAC 6 CLICK MOBILITY  Total Score:8     Patient left HOB elevated with all lines intact, call button in reach and nurse assistant present.    GOALS:   Multidisciplinary Problems     Physical Therapy Goals        Problem: Physical Therapy Goal    Goal Priority Disciplines Outcome Goal Variances Interventions   Physical Therapy Goal     PT, PT/OT Ongoing, Progressing     Description:  1.  Roll bilaterally with supervision  2.  Supine to/from sit with supervision  3.  Bed to/from chair with CG  4.  Ambulate 50' with RW with CG  5.  Sit in chair 2 hours                    History:     Past Medical History:   Diagnosis Date    Cancer     colon    Hypertension     Petit mal epilepsy 1954    Scoliosis of lumbar spine     Seizures     Unspecified hypothyroidism        Past Surgical History:   Procedure Laterality Date    APPENDECTOMY      BACK SURGERY  1988    vertebral fracture    BACK SURGERY  02/2013    lumbar L2-5    CATARACT EXTRACTION, BILATERAL Bilateral     CHOLECYSTECTOMY      open    EYE SURGERY Bilateral     cataract removal with lens implant    HYSTERECTOMY      PORTACATH PLACEMENT Right 09/2016    RENAL ARTERY STENT Left  07/19/2017    SMALL INTESTINE SURGERY  08/23/2016    TONSILLECTOMY      VAGINAL HYSTERECTOMY W/ ANTERIOR AND POSTERIOR VAGINAL REPAIR         Time Tracking:     PT Received On: 10/28/19  PT Start Time: 1302     PT Stop Time: 1318  PT Total Time (min): 16 min     Billable Minutes: Evaluation 16      Micki Real, PT  10/28/2019

## 2019-10-28 NOTE — PLAN OF CARE
Patient in the ICU, awake, but daughters at bedside giving information. Patient lives alone and is independent. Daughters transport to  Medical appts. Patient is able to get all meds without any problems. Will continue to follow.     10/28/19 1131   Discharge Assessment   Assessment Type Discharge Planning Assessment   Confirmed/corrected address and phone number on facesheet? Yes   Assessment information obtained from? Caregiver   Expected Length of Stay (days) 3   Communicated expected length of stay with patient/caregiver yes   Prior to hospitilization cognitive status: Alert/Oriented   Prior to hospitalization functional status: Independent   Current cognitive status: Alert/Oriented   Current Functional Status: Needs Assistance   Lives With alone   Able to Return to Prior Arrangements other (see comments)   Is patient able to care for self after discharge? Unable to determine at this time (comments)   Who are your caregiver(s) and their phone number(s)? Amira Linares [phone]   Patient's perception of discharge disposition admitted as an inpatient   Readmission Within the Last 30 Days no previous admission in last 30 days   Patient currently being followed by outpatient case management? No   Patient currently receives any other outside agency services? No   Equipment Currently Used at Home wheelchair;walker, rolling;cane, straight;bedside commode;shower chair   Do you have any problems affording any of your prescribed medications? No   Is the patient taking medications as prescribed? yes   Does the patient have transportation home? Yes   Does the patient receive services at the [facility]? No   Discharge Plan A Home with family   Discharge Plan B Home Health   DME Needed Upon Discharge  none   Patient/Family in Agreement with Plan yes

## 2019-10-28 NOTE — PLAN OF CARE
Problem: Physical Therapy Goal  Goal: Physical Therapy Goal  Description  1.  Roll bilaterally with supervision  2.  Supine to/from sit with supervision  3.  Bed to/from chair with CG  4.  Ambulate 50' with RW with CG  5.  Sit in chair 2 hours   Outcome: Ongoing, Progressing   Pt would benefit from skilled PT to progress functional mobility.

## 2019-10-28 NOTE — HPI
Annette Garcia is an 84 year old white woman with peripheral vascular disease, renovascular hypertension, renal artery stenosis status post left renal artery stent placement on 7/19/17, coronary artery disease with history of myocardial infarction, diffuse large B cell lymphoma, anemia, epilepsy, hypothyroidism, chronic hyponatremia, depression, lumbar and sacroiliac joint osteoarthritis with chronic low back pain and history of lumbar spine surgery in 2013, slow transit constipation, history of appendectomy, history of cholecystectomy history hysterectomy, history of small bowel obstruction status post small bowel resection on 8/23/16. She lives in [place], [place]. Her son has epilepsy. Her daughter has tuberous sclerosis. Her primary care physician is Dr. Jose Torres. Her pain management specialist is Dr. Chirag Bates. Her neurologist is Dr. Gamal Lock.    She presented to [facility] on 10/27/19 with abdominal pain, difficulty urinating, and constipation for one day. She took polyethylene glycol and senna-docusate without relief. She strained during her last attempt at having a bowel movement. She had one episode of vomiting on the day of presentation. She felt weak when walking. In the emergency department, she was found to have acute kidney injury (BUN 27, creatinine 1.6, from 9 and 0.7 on 8/29/19), elevated transaminases ( and , from 16 and 11 on 8/29/19), and lactic acidosis (5.8). Contrast CT showed acute enteritis, sigmoid and rectal constipation, and some peritoneal free fluid in the pericolic gutters.she was given lactated ringers, cefepime, and metronidazole. She required norepinephrine for septic shock. She was admitted to [facility].

## 2019-10-28 NOTE — PROGRESS NOTES
10/28/19 1000        Urethral Catheter 10/28/19  Non-latex 16 Fr.   Placement Date/Time: 10/28/19 (c)   Present Prior to Hospital Arrival?: No  Catheter Type: Non-latex  Tube Size (Fr.): 16 Fr.   Output (mL) 0 mL         Dr Bauer notified

## 2019-10-28 NOTE — SUBJECTIVE & OBJECTIVE
Interval History: awake and alert, requesting to eat. Reported 3 BM after the enema- FU with KUB.   Start liquid diet  continue IVF for hypotension, JAS, and hyponatremia  Continue Levophed for hypotension    Updated patient and family at bedside.,   At baseline patient is debbie to perform all ADLs.     Review of Systems   Constitutional: Positive for fatigue. Negative for appetite change and fever.   Eyes: Negative for visual disturbance.   Respiratory: Negative for choking and shortness of breath.    Cardiovascular: Negative for chest pain and palpitations.   Gastrointestinal: Positive for diarrhea. Negative for abdominal distention, abdominal pain, blood in stool, constipation, nausea and vomiting.   Genitourinary: Negative for difficulty urinating.   Musculoskeletal: Negative for arthralgias and gait problem.   Skin: Negative for color change and rash.   Neurological: Positive for weakness. Negative for facial asymmetry and speech difficulty.   Psychiatric/Behavioral: Negative for agitation.     Objective:     Vital Signs (Most Recent):  Temp: 98.3 °F (36.8 °C) (10/28/19 1100)  Pulse: 75 (10/28/19 1200)  Resp: 16 (10/28/19 1200)  BP: (!) 89/51 (10/28/19 1200)  SpO2: 96 % (10/28/19 1200) Vital Signs (24h Range):  Temp:  [97 °F (36.1 °C)-98.3 °F (36.8 °C)] 98.3 °F (36.8 °C)  Pulse:  [64-79] 75  Resp:  [15-35] 16  SpO2:  [88 %-100 %] 96 %  BP: ()/(27-66) 89/51     Weight: 65.2 kg (143 lb 11.8 oz)  Body mass index is 28.07 kg/m².    Intake/Output Summary (Last 24 hours) at 10/28/2019 1224  Last data filed at 10/28/2019 1200  Gross per 24 hour   Intake 2658.17 ml   Output 83 ml   Net 2575.17 ml      Physical Exam   Constitutional: No distress.   HENT:   Head: Normocephalic and atraumatic.   Neck: Neck supple.   Cardiovascular: Normal rate and regular rhythm.   Pulmonary/Chest: Effort normal and breath sounds normal. No respiratory distress.   Abdominal: Soft. Bowel sounds are normal. She exhibits no distension.  There is no tenderness. There is no guarding.   Musculoskeletal: Normal range of motion.   Neurological: She is alert.   Skin: Skin is warm and dry. Capillary refill takes less than 2 seconds. She is not diaphoretic.   Psychiatric: She has a normal mood and affect.       Significant Labs:   Blood Culture: No results for input(s): LABBLOO in the last 48 hours.  CBC:   Recent Labs   Lab 10/27/19  2207   WBC 5.82   HGB 15.1   HCT 44.8        CMP:   Recent Labs   Lab 10/28/19  0046   *   K 4.6   CL 95   CO2 21*   *   BUN 27*   CREATININE 1.6*   CALCIUM 10.1   PROT 6.1   ALBUMIN 3.6   BILITOT 1.1*   ALKPHOS 57   *   *   ANIONGAP 15   EGFRNONAA 30*     Troponin: No results for input(s): TROPONINI in the last 48 hours.  TSH:   Recent Labs   Lab 08/19/19  1009   TSH 1.580     Urine Culture: No results for input(s): LABURIN in the last 48 hours.  Urine Studies:   Recent Labs   Lab 10/28/19  0410   COLORU Yellow   APPEARANCEUA Clear   PHUR 8.0   SPECGRAV 1.015   PROTEINUA Trace*   GLUCUA Negative   KETONESU Negative   BILIRUBINUA Negative   OCCULTUA Negative   NITRITE Negative   UROBILINOGEN Negative   LEUKOCYTESUR Negative       Significant Imaging: I have reviewed all pertinent imaging results/findings within the past 24 hours.

## 2019-10-28 NOTE — ED PROVIDER NOTES
Encounter Date: 10/27/2019    SCRIBE #1 NOTE: I, Latha Stinson, am scribing for, and in the presence of,  Dr. Madsen. I have scribed the following portions of the note - Other sections scribed: HPI, ROS, PE.       History     Chief Complaint   Patient presents with    Abdominal Pain     reoprts abd pain since last night. reports constipation, last BM today, states was a smear. vomiting X2-3 times. lives at home alone.      Annette Garcia is a 84 y.o. female who  has a past medical history of bowel obstruction, cancer, hypertension, petit mal epilepsy (1954), scoliosis of lumbar spine, seizures, and unspecified hypothyroidism. PHSx of porthacath placement, cholecystectomy, appendectomy, colon surgery, hysterectomy, and back surgery.    The patient presents to the ED due to difficulty urinating and constipation onset last night. Patient last void this morning, but unable to urinate otherwise. She took Miralax and Senokot today without relief. She notes straining during last attempt to have a BM today. She was passing gas yesterday, but not today. Reports associated abdominal pain, which has been intermittent since yesterday, but worsening today.  Reports one episode of non-bloody, non-bilious vomiting today. She also feels very weak when walking. Denies fever, cough, cold symptoms, chest pain, or shortness of breath. Recently was prescribed mood stabilizer. Patient wears diapers.    Daughter is at bedside.      The history is provided by the patient and medical records. No  was used.     Review of patient's allergies indicates:   Allergen Reactions    Adhesive Itching and Blisters    Penicillins Anaphylaxis    Tramadol Hives    Avelox [moxifloxacin] Rash     Facial and arm itching and redness. Pt states throat closes when given.    Amoxil [amoxicillin]     Aspridrox [aspirin, buffered]     Codeine Other (See Comments)     Throat swelling    Keflex [cephalexin]     Norvasc  [amlodipine]     Red dye Hives    Robitussin [guaifenesin]     Sulfa (sulfonamide antibiotics)     Tylenol [acetaminophen]      Has reaction to Tylenol with red dye and unable to take Extra Strength Tylenol/ CAN ONLY TOLERATE REG STRENGTH TYLENOL    Vicks vaporub [camphor-eucalyptus oil-menthol]      Past Medical History:   Diagnosis Date    Cancer     colon    Hypertension     Petit mal epilepsy 1954    Scoliosis of lumbar spine     Seizures     Unspecified hypothyroidism      Past Surgical History:   Procedure Laterality Date    APPENDECTOMY      BACK SURGERY  1988    vertebral fracture    BACK SURGERY  02/2013    lumbar L2-5    CATARACT EXTRACTION, BILATERAL Bilateral     CHOLECYSTECTOMY      open    COLON SURGERY      EYE SURGERY Bilateral     cataract removal with lens implant    HYSTERECTOMY      PORTACATH PLACEMENT Right 09/2016    TONSILLECTOMY      VAGINAL HYSTERECTOMY W/ ANTERIOR AND POSTERIOR VAGINAL REPAIR       Family History   Problem Relation Age of Onset    Pancreatic cancer Mother     Hypertension Father     Heart attack Father      Social History     Tobacco Use    Smoking status: Never Smoker    Smokeless tobacco: Never Used   Substance Use Topics    Alcohol use: No    Drug use: No     Review of Systems   Constitutional: Negative for chills and fever.   HENT: Negative for sore throat.    Respiratory: Negative for cough and shortness of breath.    Cardiovascular: Negative for chest pain.   Gastrointestinal: Positive for abdominal pain, constipation, nausea and vomiting.   Genitourinary: Positive for difficulty urinating.   Musculoskeletal: Negative for back pain, neck pain and neck stiffness.   Skin: Negative for rash and wound.   Neurological: Positive for weakness. Negative for syncope.   Hematological: Does not bruise/bleed easily.   Psychiatric/Behavioral: Negative for agitation, behavioral problems and confusion.   All other systems reviewed and are  negative.      Physical Exam     Initial Vitals   BP Pulse Resp Temp SpO2   10/27/19 2044 10/27/19 2222 10/27/19 2044 10/27/19 2044 10/27/19 2044   120/66 69 20 97.9 °F (36.6 °C) 99 %      MAP       --                Physical Exam    Nursing note and vitals reviewed.  Constitutional:  Non-toxic appearance. No distress.   HENT:   Head: Normocephalic and atraumatic.   Eyes: Conjunctivae and EOM are normal. Pupils are equal, round, and reactive to light. Right eye exhibits normal extraocular motion.   Neck: Neck supple.   Cardiovascular: Regular rhythm, S1 normal, S2 normal and normal heart sounds.   No murmur heard.  Pulmonary/Chest: Breath sounds normal. No respiratory distress. She has no wheezes. She has no rales.   Abdominal: She exhibits distension. Bowel sounds are decreased. There is tenderness. There is no rebound and no guarding.   Abdominal distension. Diffuse tenderness. No rebound or guarding. Hypoactive bowel sounds on exam.   Neurological: She is alert. No cranial nerve deficit. GCS score is 15. GCS eye subscore is 4. GCS verbal subscore is 5. GCS motor subscore is 6.   Skin: Skin is warm. No rash noted.   Psychiatric: She has a normal mood and affect. Her behavior is normal.         ED Course   Procedures  Labs Reviewed   CBC W/ AUTO DIFFERENTIAL - Abnormal; Notable for the following components:       Result Value    Mean Corpuscular Volume 100 (*)     Mean Corpuscular Hemoglobin 33.7 (*)     Lymph # 0.9 (*)     Mono # 0.2 (*)     Gran% 80.5 (*)     Lymph% 14.9 (*)     All other components within normal limits   LACTIC ACID, PLASMA - Abnormal; Notable for the following components:    Lactate (Lactic Acid) 5.8 (*)     All other components within normal limits    Narrative:       LA critical result(s) called and verbal readback obtained from Dr. Madsen , 10/28/2019 00:22   COMPREHENSIVE METABOLIC PANEL - Abnormal; Notable for the following components:    Sodium 131 (*)     CO2 21 (*)     Glucose 154 (*)      BUN, Bld 27 (*)     Creatinine 1.6 (*)     Total Bilirubin 1.1 (*)      (*)      (*)     eGFR if  34 (*)     eGFR if non  30 (*)     All other components within normal limits   CULTURE, BLOOD   CULTURE, BLOOD   CK   CK   URINALYSIS, REFLEX TO URINE CULTURE          Imaging Results          CT Abdomen Pelvis With Contrast (Final result)  Result time 10/27/19 23:37:51    Final result by Carolina Harvey MD (10/27/19 23:37:51)                 Impression:      1. Long segment abnormal small bowel wall thickening with increased mucosal enhancement consistent with acute enteritis.  No evidence of abnormal small bowel dilatation or obstruction.  2. Prominent retained stool seen throughout the colon with large volume stool seen within the sigmoid colon and rectum.  Correlate for possible constipation and fecal impaction.  3. Small volume peritoneal free fluid seen along the bilateral pericolic gutters.  4. Postsurgical changes and multiple additional findings as detailed above.      Electronically signed by: Carolina Harvey MD  Date:    10/27/2019  Time:    23:37             Narrative:    EXAMINATION:  CT ABDOMEN PELVIS WITH CONTRAST    CLINICAL HISTORY:  Bowel obstruction, high-grade;    TECHNIQUE:  Low dose axial images, sagittal and coronal reformations were obtained from the lung bases to the pubic symphysis following the IV administration of 75 mL of Omnipaque 350 .  Oral contrast was not given.    COMPARISON:  CT chest abdomen and pelvis from August 2018.    FINDINGS:  The visualized portion of the heart is unremarkable.  Stable clustering of nodules is seen within the right lung base.  Lung bases show no evidence of focal consolidation or pleural effusion.    No significant hepatic abnormalities are identified.  There is minimal intrahepatic biliary ductal dilatation.  Gallbladder has been removed.  Stomach is distended with air-fluid level seen.  Fluid is seen within  the distal esophagus which may relate to gastroesophageal reflux.    Kidneys enhance normally with no evidence of hydronephrosis.  Urinary bladder is unremarkable.  Uterus has been removed.    Postsurgical dural changes of partial bowel resection are seen.  There is long segment of abnormal small bowel wall thickening with increased mucosal enhancement consistent with acute enteritis.  No surrounding inflammatory changes are seen.  No evidence of abnormal small bowel dilatation or obstruction.  Appendix is not visualized and may have been removed.  Prominent stool is seen within the colon with large volume retained stool seen within the distal sigmoid colon and rectum.  No free air identified.  Small volume peritoneal fluid is seen along the bilateral pericolic gutters.    Aorta tapers normally with prominent atherosclerosis.  Left renal artery stent is seen.    No acute osseous abnormality identified.  Multilevel degenerative changes are seen within the lumbar spine with lower lumbar laminectomies seen.  Remote compression fractures are seen of the T11 and L2 vertebral bodies.  Subcutaneous soft tissue structures are unremarkable.                                 Medical Decision Making:   History:   Old Medical Records: I decided to obtain old medical records.  Differential Diagnosis:   Differential Diagnosis includes, but is not limited to:  AAA, aortic dissection, mesenteric ischemia, perforated viscous, MI/ACS, SBO/volvulus, incarcerated/strangulated hernia, intussusception, ileus, appendicitis, cholecystitis, cholangitis, diverticulitis, esophagitis, hepatitis, nephrolithiasis, pancreatitis, gastroenteritis, colitis, IBD/IBS, biliary colic, GERD, PUD, constipation, UTI/pyelonephritis,  disorder.  Clinical Tests:   Lab Tests: Ordered and Reviewed  Radiological Study: Ordered and Reviewed  ED Management:  No evidence of small-bowel obstruction pneumatosis or free air.    Patient was given enema with mild  improvement of symptoms.    Unclear etiology of symptoms. His enteric ischemia less likely no evidence of small-bowel obstruction.  Large amount of colonic stool.  Patient does not have focal significant focal or peritoneal tenderness to warrant emergent surgical consult.  Patient will be admitted to Medicine for further evaluation treatment.            Scribe Attestation:   Scribe #1: I performed the above scribed service and the documentation accurately describes the services I performed. I attest to the accuracy of the note.    Attending Attestation:         Attending Critical Care:   Critical Care Times:   Direct Patient Care (initial evaluation, reassessments, and time considering the case)................................................................5 minutes.   Additional History from reviewing old medical records or taking additional history from the family, EMS, PCP, etc.......................5 minutes.   Ordering, Reviewing, and Interpreting Diagnostic Studies...............................................................................................................5 minutes.   Documentation..................................................................................................................................................................................5 minutes.   Consultation with other Physicians. .................................................................................................................................................5 minutes.   ==============================================================  · Total Critical Care Time - exclusive of procedural time: 25 minutes.  ==============================================================  Critical care was necessary to treat or prevent imminent or life-threatening deterioration of the following conditions: nontraumatic shock.               ED Course as of Oct 29 1121   Mon Oct 28, 2019   0023 InformedBy lab that her 2nd CMP  is hemolyzed.    [RN]   0023 Patient went to CTScan with contrast prior to CMP being resulted.  Charge nurse aware.    [RN]   0138 Patient's labs with elevated transaminases elevated lactate and no evidence of leukocytosis.  Patient will be given IV fluids.  Medicines not suggest sepsis.  CT scan shows likely enteritis.  Will admit to Medicine for further evaluation and treatment.    [RN]   0356 Received sign-out from Dr. Madsen, patient admitted to the hospitalist team for undifferentiated shock.  Patient suddenly became hypotensive with a systolic of the 60s, cool and clammy, mentating well.  I immediately examined patient, placed her in Trendelenburg, will start peripheral Levophed, will consult anesthesia to place Stat IJ.  Hospitalist team aware, patient remains critically ill.    [SE]      ED Course User Index  [RN] Kevin Madsen Jr., MD  [SE] Felix Ghosh MD     Clinical Impression:     1. Lactic acidosis        Scribe Attestation I, Kevin Madsen,  personally performed the services described in this documentation. All medical record entries made by the scribe were at my direction and in my presence.  I have reviewed the chart and agree that the record reflects my personal performance and is accurate and complete. Kevin Madsen M.D. 11:23 AM10/29/2019          Disposition:   Disposition: Placed in Observation  Condition: Fair                        Kevin Madsen Jr., MD  10/29/19 1123       Kevin Madsen Jr., MD  12/18/19 0436

## 2019-10-28 NOTE — ASSESSMENT & PLAN NOTE
Ct abdomen with enteritis and stool burden   continue Flagyl and flagyl  Blood cx pending   Monitor trend lactic acid

## 2019-10-28 NOTE — H&P
[facility]  History & Physical    Patient Name: Annette Garcia  MRN: [id number]  Admission Date: 10/27/2019  Attending Physician: Alice Barker*   Primary Care Provider: Jose Valles MD         Patient information was obtained from patient, past medical records and ER records.     Subjective:     Principal Problem:Lactic acidosis    Chief Complaint:   Chief Complaint   Patient presents with    Abdominal Pain     reoprts abd pain since last night. reports constipation, last BM today, states was a smear. vomiting X2-3 times. lives at home alone.         HPI: Annette Garcia is an 84 y.o. female who  has a past medical history of bowel obstruction, cancer, hypertension, petit mal epilepsy, scoliosis of lumbar spine, seizures, and unspecified hypothyroidism. PHSx of porthacath placement, cholecystectomy, appendectomy, colon surgery, hysterectomy, and back surgery. She lives in [place], [place] with her daughter. Her PCP is Dr. Jose Torres.    She presented to the Emergency Department at [facility] 10/28/2019 with a chief complaint of abdominal pain. Associated symptoms include difficulty urinating and constipation that started one day prior. She took Miralax and Senokot without relief. She notes straining during last attempt to have a BM.  Reports one episode of non-bloody, non-bilious vomiting today. She also feels very weak when walking. Denies fever, cough, cold symptoms, chest pain, or shortness of breath. Of note, she was recently prescribed mood stabilizer. ED workup revealed MCV (100), Lactic Acid (5.8), Na (131), CO2 (21), Glucose (154), Bun (27), Cr (1.6), total Bili (1.1), AST (487), aLT (361), CT abdomen and pelvis revealed  small bowel wall thickening with increased mucosal enhancement consistent with acute enteritis,  and possible constipation and fecal impaction. Patient hypotensive during ED stay, Norepinephrine IV started.  Admitted to [facility] for further evaluation and treatment.     Past Medical History:   Diagnosis Date    Cancer     colon    Hypertension     Petit mal epilepsy 1954    Scoliosis of lumbar spine     Seizures     Unspecified hypothyroidism        Past Surgical History:   Procedure Laterality Date    APPENDECTOMY      BACK SURGERY  1988    vertebral fracture    BACK SURGERY  02/2013    lumbar L2-5    CATARACT EXTRACTION, BILATERAL Bilateral     CHOLECYSTECTOMY      open    COLON SURGERY      EYE SURGERY Bilateral     cataract removal with lens implant    HYSTERECTOMY      PORTACATH PLACEMENT Right 09/2016    TONSILLECTOMY      VAGINAL HYSTERECTOMY W/ ANTERIOR AND POSTERIOR VAGINAL REPAIR         Review of patient's allergies indicates:   Allergen Reactions    Adhesive Itching and Blisters    Penicillins Anaphylaxis    Tramadol Hives    Avelox [moxifloxacin] Rash     Facial and arm itching and redness. Pt states throat closes when given.    Amoxil [amoxicillin]     Aspridrox [aspirin, buffered]     Codeine Other (See Comments)     Throat swelling    Keflex [cephalexin]     Norvasc [amlodipine]     Red dye Hives    Robitussin [guaifenesin]     Sulfa (sulfonamide antibiotics)     Tylenol [acetaminophen]      Has reaction to Tylenol with red dye and unable to take Extra Strength Tylenol/ CAN ONLY TOLERATE REG STRENGTH TYLENOL    Vicks vaporub [camphor-eucalyptus oil-menthol]        No current facility-administered medications on file prior to encounter.      Current Outpatient Medications on File Prior to Encounter   Medication Sig    acetaminophen (TYLENOL) 325 MG tablet Take 2 tablets (650 mg total) by mouth every 4 (four) hours as needed for Pain.    calcium carbonate (OS-RICHARDSON) 600 mg calcium (1,500 mg) Tab Take 600 mg by mouth once.    carvedilol (COREG) 25 MG tablet Take 1 tablet (25 mg total) by mouth 2 (two) times daily with meals.    cloNIDine (CATAPRES) 0.2 MG  tablet Take 1 tablet (0.2 mg total) by mouth 2 (two) times daily.    denosumab (PROLIA) 60 mg/mL Syrg Inject 60 mg into the skin every 6 (six) months.    levothyroxine (SYNTHROID) 125 MCG tablet TAKE 1 TABLET (125 MCG TOTAL) BY MOUTH ONCE DAILY.    lisinopril (PRINIVIL,ZESTRIL) 40 MG tablet Take 1 tablet (40 mg total) by mouth once daily.    magnesium 250 mg Tab Take by mouth once.    mirtazapine (REMERON) 7.5 MG Tab Take 1 tablet (7.5 mg total) by mouth every evening. (Patient not taking: Reported on 10/18/2019)    ondansetron (ZOFRAN) 4 MG tablet Take 1 tablet (4 mg total) by mouth every 8 (eight) hours as needed for Nausea.    OXcarbazepine (TRILEPTAL) 300 MG Tab Take 1 tablet (300 mg total) by mouth nightly.    oxybutynin (DITROPAN) 5 MG Tab One po every 6 hours as need for bladder irritation    PHENobarbital (LUMINAL) 64.8 MG tablet TAKE 1 TABLET (64.8 MG TOTAL) BY MOUTH EVERY EVENING.    polyethylene glycol (GLYCOLAX) 17 gram PwPk Take 17 g by mouth 2 (two) times daily as needed (Constipation).    pravastatin (PRAVACHOL) 20 MG tablet Take 1 tablet (20 mg total) by mouth once daily.    SHINGRIX, PF, 50 mcg/0.5 mL injection     triamcinolone acetonide 0.1% (KENALOG) 0.1 % cream Apply topically 2 (two) times daily. When needed for sores on her head    vitamin D (VITAMIN D3) 1000 units Tab Take 1,000 Units by mouth once daily.     Family History     Problem Relation (Age of Onset)    Heart attack Father    Hypertension Father    Pancreatic cancer Mother        Tobacco Use    Smoking status: Never Smoker    Smokeless tobacco: Never Used   Substance and Sexual Activity    Alcohol use: No    Drug use: No    Sexual activity: Not on file     Review of Systems   Constitutional: Positive for appetite change and fatigue. Negative for fever.   HENT: Negative for congestion, dental problem, postnasal drip and sinus pressure.    Eyes: Negative for redness and visual disturbance.   Respiratory: Negative for  cough, choking and shortness of breath.    Cardiovascular: Negative for chest pain and palpitations.   Gastrointestinal: Positive for abdominal pain, constipation, nausea and vomiting. Negative for abdominal distention, blood in stool and diarrhea.   Endocrine: Negative for polydipsia and polyuria.   Genitourinary: Positive for difficulty urinating. Negative for flank pain and hematuria.   Musculoskeletal: Negative for arthralgias and gait problem.   Skin: Negative for color change and rash.   Allergic/Immunologic: Negative for food allergies.   Neurological: Positive for weakness. Negative for dizziness, facial asymmetry and speech difficulty.   Psychiatric/Behavioral: Negative for agitation.     Objective:     Vital Signs (Most Recent):  Temp: 97 °F (36.1 °C) (10/28/19 0224)  Pulse: 73 (10/28/19 0541)  Resp: (!) 27 (10/28/19 0537)  BP: (!) 117/58 (10/28/19 0541)  SpO2: 97 % (10/28/19 0537) Vital Signs (24h Range):  Temp:  [97 °F (36.1 °C)-97.9 °F (36.6 °C)] 97 °F (36.1 °C)  Pulse:  [64-73] 73  Resp:  [20-31] 27  SpO2:  [88 %-100 %] 97 %  BP: ()/(27-66) 117/58     Weight: 63.5 kg (140 lb)  Body mass index is 29.77 kg/m².    Physical Exam   Constitutional: No distress.   HENT:   Head: Normocephalic and atraumatic.   Eyes: Pupils are equal, round, and reactive to light.   Neck: Normal range of motion. No JVD present.   Cardiovascular: Normal rate and regular rhythm.   Pulmonary/Chest: Effort normal and breath sounds normal. No respiratory distress.   Abdominal: She exhibits distension. There is tenderness. There is guarding.   Decreased bowel sounds X 4 quadrants   Musculoskeletal: Normal range of motion.   Neurological: She is alert.   Skin: Skin is warm and dry. Capillary refill takes less than 2 seconds. She is not diaphoretic.   Psychiatric: She has a normal mood and affect.         CRANIAL NERVES     CN III, IV, VI   Pupils are equal, round, and reactive to light.       Significant Labs:   Bilirubin:    Recent Labs   Lab 10/28/19  0046   BILITOT 1.1*     BMP:   Recent Labs   Lab 10/28/19  0046   *   *   K 4.6   CL 95   CO2 21*   BUN 27*   CREATININE 1.6*   CALCIUM 10.1     CBC:   Recent Labs   Lab 10/27/19  2207   WBC 5.82   HGB 15.1   HCT 44.8        CMP:   Recent Labs   Lab 10/28/19  0046   *   K 4.6   CL 95   CO2 21*   *   BUN 27*   CREATININE 1.6*   CALCIUM 10.1   PROT 6.1   ALBUMIN 3.6   BILITOT 1.1*   ALKPHOS 57   *   *   ANIONGAP 15   EGFRNONAA 30*     Lactic Acid:   Recent Labs   Lab 10/27/19  2350 10/28/19  0410   LACTATE 5.8* 6.2*     TSH:   Recent Labs   Lab 08/19/19  1009   TSH 1.580     Urine Studies:   Recent Labs   Lab 10/28/19  0410   COLORU Yellow   APPEARANCEUA Clear   PHUR 8.0   SPECGRAV 1.015   PROTEINUA Trace*   GLUCUA Negative   KETONESU Negative   BILIRUBINUA Negative   OCCULTUA Negative   NITRITE Negative   UROBILINOGEN Negative   LEUKOCYTESUR Negative     All pertinent labs within the past 24 hours have been reviewed.    Significant Imaging: I have reviewed all pertinent imaging results/findings within the past 24 hours.     Imaging Results          X-Ray Chest 1 View (Final result)  Result time 10/28/19 05:34:04    Final result by Parker Andre MD (10/28/19 05:34:04)                 Impression:      Right-sided central venous catheters are noted distal tip superior at the level of the expected location of the SVC.    Diminished depth of inspiration and chronic change noted without additional evidence for superimposed acute intrathoracic process.      Electronically signed by: Parker Andre  Date:    10/28/2019  Time:    05:34             Narrative:    EXAMINATION:  XR CHEST 1 VIEW    CLINICAL HISTORY:  central line placement;    TECHNIQUE:  Single frontal view of the chest was performed.    COMPARISON:  August 29, 2019    FINDINGS:  Single portable chest view is submitted.  Right-sided subcutaneous central venous catheter again noted,  in the interim a right-sided internal jugular central venous catheter are appears to have been placed, the distal tip of both of these appears at the expected location of the SVC.  There is diminished depth of inspiration and mild rotation when accounting for this the cardiomediastinal silhouette appears stable.  Accentuated pulmonary bronchovascular markings consistent with diminished depth of inspiration noted.  There is no evidence for confluent infiltrate or consolidation, significant pleural effusion or pneumothorax.  The osseous structures demonstrate chronic change including prominent chronic appearance of the right humeral head, and vertebral body height loss along the distal thoracic spine.                               CT Abdomen Pelvis With Contrast (Final result)  Result time 10/27/19 23:37:51    Final result by Carolina Harvey MD (10/27/19 23:37:51)                 Impression:      1. Long segment abnormal small bowel wall thickening with increased mucosal enhancement consistent with acute enteritis.  No evidence of abnormal small bowel dilatation or obstruction.  2. Prominent retained stool seen throughout the colon with large volume stool seen within the sigmoid colon and rectum.  Correlate for possible constipation and fecal impaction.  3. Small volume peritoneal free fluid seen along the bilateral pericolic gutters.  4. Postsurgical changes and multiple additional findings as detailed above.      Electronically signed by: Carolina Harvey MD  Date:    10/27/2019  Time:    23:37             Narrative:    EXAMINATION:  CT ABDOMEN PELVIS WITH CONTRAST    CLINICAL HISTORY:  Bowel obstruction, high-grade;    TECHNIQUE:  Low dose axial images, sagittal and coronal reformations were obtained from the lung bases to the pubic symphysis following the IV administration of 75 mL of Omnipaque 350 .  Oral contrast was not given.    COMPARISON:  CT chest abdomen and pelvis from August 2018.    FINDINGS:  The  visualized portion of the heart is unremarkable.  Stable clustering of nodules is seen within the right lung base.  Lung bases show no evidence of focal consolidation or pleural effusion.    No significant hepatic abnormalities are identified.  There is minimal intrahepatic biliary ductal dilatation.  Gallbladder has been removed.  Stomach is distended with air-fluid level seen.  Fluid is seen within the distal esophagus which may relate to gastroesophageal reflux.    Kidneys enhance normally with no evidence of hydronephrosis.  Urinary bladder is unremarkable.  Uterus has been removed.    Postsurgical dural changes of partial bowel resection are seen.  There is long segment of abnormal small bowel wall thickening with increased mucosal enhancement consistent with acute enteritis.  No surrounding inflammatory changes are seen.  No evidence of abnormal small bowel dilatation or obstruction.  Appendix is not visualized and may have been removed.  Prominent stool is seen within the colon with large volume retained stool seen within the distal sigmoid colon and rectum.  No free air identified.  Small volume peritoneal fluid is seen along the bilateral pericolic gutters.    Aorta tapers normally with prominent atherosclerosis.  Left renal artery stent is seen.    No acute osseous abnormality identified.  Multilevel degenerative changes are seen within the lumbar spine with lower lumbar laminectomies seen.  Remote compression fractures are seen of the T11 and L2 vertebral bodies.  Subcutaneous soft tissue structures are unremarkable.                                  Assessment/Plan:     * Lactic acidosis  Hypotension    Admit to ICU  Lactic acid (6.2)  Oxygen as needed to keep sats >92%  NS 125ml/hr  Norepinephrine infusion 2mcg/kg/min          Peripheral vascular disease, unspecified  Hyperlipidemia     Pravastatin 20 mg po daily. Continue      Old MI (myocardial infarction)  Noted, Troponin wnl      Abnormal liver  enzymes  ALT (361), AST (487)  Monitor electrolytes        Hyponatremia  NS 125ml/hr for Gentle hydration      Chronic kidney disease, stage IV (severe)  Cr (1.6)  Avoid Nephrotoxic agents  Renally dose all meds  Strict I&Os  Daily weights      Depression  Mirtazapine 7.5mg po QHS      Anemia due to antineoplastic chemotherapy  H&H stable  CBC daily    DLBCL (diffuse large B cell lymphoma)  Noted      Acquired hypothyroidism  Levothyroxine 125 mcg po before breakfast      Epilepsy  Trileptal 300 mg po nightly  Phenobarbital 64.8 mg po nightly      Renovascular hypertension  Hold Coreg and Lisinopril for now         VTE Risk Mitigation (From admission, onward)         Ordered     IP VTE HIGH RISK PATIENT  Once      10/28/19 0607     Place sequential compression device  Until discontinued      10/28/19 0607              Critical care time spent on the evaluation and treatment of severe organ dysfunction, review of pertinent labs and imaging studies, discussions with consulting providers and discussions with patient/family: 45 minutes.     Neda Goldberg, APRN, FNP-C  Department of Hospital Medicine   [facility]

## 2019-10-29 ENCOUNTER — ANESTHESIA EVENT (OUTPATIENT)
Dept: SURGERY | Facility: HOSPITAL | Age: 84
DRG: 853 | End: 2019-10-29
Payer: MEDICARE

## 2019-10-29 ENCOUNTER — ANESTHESIA (OUTPATIENT)
Dept: SURGERY | Facility: HOSPITAL | Age: 84
DRG: 853 | End: 2019-10-29
Payer: MEDICARE

## 2019-10-29 PROBLEM — R78.81 GRAM-NEGATIVE BACTEREMIA: Status: ACTIVE | Noted: 2019-10-29

## 2019-10-29 PROBLEM — R10.9 ABDOMINAL PAIN: Status: ACTIVE | Noted: 2019-10-29

## 2019-10-29 PROBLEM — K59.01 SLOW TRANSIT CONSTIPATION: Chronic | Status: ACTIVE | Noted: 2019-10-28

## 2019-10-29 PROBLEM — K55.049: Status: ACTIVE | Noted: 2019-10-27

## 2019-10-29 LAB
ALBUMIN SERPL BCP-MCNC: 1.9 G/DL (ref 3.5–5.2)
ALLENS TEST: ABNORMAL
AMIKACIN SERPL-MCNC: 23.4 UG/ML
ANION GAP SERPL CALC-SCNC: 11 MMOL/L (ref 8–16)
ANION GAP SERPL CALC-SCNC: 9 MMOL/L (ref 8–16)
BASOPHILS # BLD AUTO: ABNORMAL K/UL (ref 0–0.2)
BASOPHILS NFR BLD: 0 % (ref 0–1.9)
BUN SERPL-MCNC: 44 MG/DL (ref 8–23)
BUN SERPL-MCNC: 46 MG/DL (ref 8–23)
CALCIUM SERPL-MCNC: 7.8 MG/DL (ref 8.7–10.5)
CALCIUM SERPL-MCNC: 8.2 MG/DL (ref 8.7–10.5)
CHLORIDE SERPL-SCNC: 95 MMOL/L (ref 95–110)
CHLORIDE SERPL-SCNC: 95 MMOL/L (ref 95–110)
CO2 SERPL-SCNC: 21 MMOL/L (ref 23–29)
CO2 SERPL-SCNC: 22 MMOL/L (ref 23–29)
CREAT SERPL-MCNC: 2.1 MG/DL (ref 0.5–1.4)
CREAT SERPL-MCNC: 2.1 MG/DL (ref 0.5–1.4)
DELSYS: ABNORMAL
DIFFERENTIAL METHOD: ABNORMAL
EOSINOPHIL # BLD AUTO: ABNORMAL K/UL (ref 0–0.5)
EOSINOPHIL NFR BLD: 0 % (ref 0–8)
ERYTHROCYTE [DISTWIDTH] IN BLOOD BY AUTOMATED COUNT: 14.1 % (ref 11.5–14.5)
EST. GFR  (AFRICAN AMERICAN): 25 ML/MIN/1.73 M^2
EST. GFR  (AFRICAN AMERICAN): 25 ML/MIN/1.73 M^2
EST. GFR  (NON AFRICAN AMERICAN): 21 ML/MIN/1.73 M^2
EST. GFR  (NON AFRICAN AMERICAN): 21 ML/MIN/1.73 M^2
GLUCOSE SERPL-MCNC: 124 MG/DL (ref 70–110)
GLUCOSE SERPL-MCNC: 97 MG/DL (ref 70–110)
HCO3 UR-SCNC: 19.3 MMOL/L (ref 24–28)
HCT VFR BLD AUTO: 36 % (ref 37–48.5)
HGB BLD-MCNC: 11.9 G/DL (ref 12–16)
LACTATE SERPL-SCNC: 3.6 MMOL/L (ref 0.5–2.2)
LYMPHOCYTES # BLD AUTO: ABNORMAL K/UL (ref 1–4.8)
LYMPHOCYTES NFR BLD: 20 % (ref 18–48)
MAGNESIUM SERPL-MCNC: 4.6 MG/DL (ref 1.6–2.6)
MCH RBC QN AUTO: 33.1 PG (ref 27–31)
MCHC RBC AUTO-ENTMCNC: 33.1 G/DL (ref 32–36)
MCV RBC AUTO: 100 FL (ref 82–98)
METAMYELOCYTES NFR BLD MANUAL: 21 %
MONOCYTES # BLD AUTO: ABNORMAL K/UL (ref 0.3–1)
MONOCYTES NFR BLD: 1 % (ref 4–15)
MYELOCYTES NFR BLD MANUAL: 20 %
NEUTROPHILS # BLD AUTO: ABNORMAL K/UL (ref 1.8–7.7)
NEUTROPHILS NFR BLD: 12 % (ref 38–73)
NEUTS BAND NFR BLD MANUAL: 26 %
PATH REV BLD -IMP: NORMAL
PCO2 BLDA: 33.5 MMHG (ref 35–45)
PH SMN: 7.37 [PH] (ref 7.35–7.45)
PHOSPHATE SERPL-MCNC: 6.5 MG/DL (ref 2.7–4.5)
PLATELET # BLD AUTO: 126 K/UL (ref 150–350)
PLATELET BLD QL SMEAR: ABNORMAL
PMV BLD AUTO: 10.7 FL (ref 9.2–12.9)
PO2 BLDA: 61 MMHG (ref 80–100)
POC BE: -6 MMOL/L
POC SATURATED O2: 91 % (ref 95–100)
POC TCO2: 20 MMOL/L (ref 23–27)
POTASSIUM SERPL-SCNC: 5 MMOL/L (ref 3.5–5.1)
POTASSIUM SERPL-SCNC: 5 MMOL/L (ref 3.5–5.1)
RBC # BLD AUTO: 3.59 M/UL (ref 4–5.4)
SAMPLE: ABNORMAL
SITE: ABNORMAL
SODIUM SERPL-SCNC: 126 MMOL/L (ref 136–145)
SODIUM SERPL-SCNC: 127 MMOL/L (ref 136–145)
WBC # BLD AUTO: 2.65 K/UL (ref 3.9–12.7)

## 2019-10-29 PROCEDURE — 80048 BASIC METABOLIC PNL TOTAL CA: CPT

## 2019-10-29 PROCEDURE — 88307 TISSUE EXAM BY PATHOLOGIST: CPT | Mod: 26,,, | Performed by: PATHOLOGY

## 2019-10-29 PROCEDURE — 44141 PR PART REMOVAL COLON W COLOSTOMY: ICD-10-PCS | Mod: ,,, | Performed by: STUDENT IN AN ORGANIZED HEALTH CARE EDUCATION/TRAINING PROGRAM

## 2019-10-29 PROCEDURE — 25000003 PHARM REV CODE 250: Performed by: HOSPITALIST

## 2019-10-29 PROCEDURE — 36000709 HC OR TIME LEV III EA ADD 15 MIN: Performed by: STUDENT IN AN ORGANIZED HEALTH CARE EDUCATION/TRAINING PROGRAM

## 2019-10-29 PROCEDURE — 37000009 HC ANESTHESIA EA ADD 15 MINS: Performed by: STUDENT IN AN ORGANIZED HEALTH CARE EDUCATION/TRAINING PROGRAM

## 2019-10-29 PROCEDURE — 85007 BL SMEAR W/DIFF WBC COUNT: CPT

## 2019-10-29 PROCEDURE — 25000003 PHARM REV CODE 250: Performed by: NURSE PRACTITIONER

## 2019-10-29 PROCEDURE — C9290 INJ, BUPIVACAINE LIPOSOME: HCPCS | Performed by: STUDENT IN AN ORGANIZED HEALTH CARE EDUCATION/TRAINING PROGRAM

## 2019-10-29 PROCEDURE — 25000003 PHARM REV CODE 250: Performed by: EMERGENCY MEDICINE

## 2019-10-29 PROCEDURE — 80048 BASIC METABOLIC PNL TOTAL CA: CPT | Mod: 91

## 2019-10-29 PROCEDURE — 63600175 PHARM REV CODE 636 W HCPCS: Performed by: INTERNAL MEDICINE

## 2019-10-29 PROCEDURE — 94002 VENT MGMT INPAT INIT DAY: CPT

## 2019-10-29 PROCEDURE — 85060 BLOOD SMEAR INTERPRETATION: CPT | Mod: ,,, | Performed by: PATHOLOGY

## 2019-10-29 PROCEDURE — 44141 PARTIAL REMOVAL OF COLON: CPT | Mod: ,,, | Performed by: STUDENT IN AN ORGANIZED HEALTH CARE EDUCATION/TRAINING PROGRAM

## 2019-10-29 PROCEDURE — 88307 TISSUE EXAM BY PATHOLOGIST: CPT | Performed by: PATHOLOGY

## 2019-10-29 PROCEDURE — 82803 BLOOD GASES ANY COMBINATION: CPT

## 2019-10-29 PROCEDURE — 25000003 PHARM REV CODE 250: Performed by: STUDENT IN AN ORGANIZED HEALTH CARE EDUCATION/TRAINING PROGRAM

## 2019-10-29 PROCEDURE — 83735 ASSAY OF MAGNESIUM: CPT

## 2019-10-29 PROCEDURE — 85060 PATHOLOGIST REVIEW: ICD-10-PCS | Mod: ,,, | Performed by: PATHOLOGY

## 2019-10-29 PROCEDURE — 94761 N-INVAS EAR/PLS OXIMETRY MLT: CPT

## 2019-10-29 PROCEDURE — 99900035 HC TECH TIME PER 15 MIN (STAT)

## 2019-10-29 PROCEDURE — 84100 ASSAY OF PHOSPHORUS: CPT

## 2019-10-29 PROCEDURE — 20000000 HC ICU ROOM

## 2019-10-29 PROCEDURE — 36600 WITHDRAWAL OF ARTERIAL BLOOD: CPT

## 2019-10-29 PROCEDURE — 63600175 PHARM REV CODE 636 W HCPCS: Performed by: FAMILY MEDICINE

## 2019-10-29 PROCEDURE — 85027 COMPLETE CBC AUTOMATED: CPT

## 2019-10-29 PROCEDURE — 37000008 HC ANESTHESIA 1ST 15 MINUTES: Performed by: STUDENT IN AN ORGANIZED HEALTH CARE EDUCATION/TRAINING PROGRAM

## 2019-10-29 PROCEDURE — 63600175 PHARM REV CODE 636 W HCPCS: Performed by: STUDENT IN AN ORGANIZED HEALTH CARE EDUCATION/TRAINING PROGRAM

## 2019-10-29 PROCEDURE — 27000221 HC OXYGEN, UP TO 24 HOURS

## 2019-10-29 PROCEDURE — S0030 INJECTION, METRONIDAZOLE: HCPCS | Performed by: HOSPITALIST

## 2019-10-29 PROCEDURE — 88307 TISSUE SPECIMEN TO PATHOLOGY - SURGERY: ICD-10-PCS | Mod: 26,,, | Performed by: PATHOLOGY

## 2019-10-29 PROCEDURE — 83605 ASSAY OF LACTIC ACID: CPT

## 2019-10-29 PROCEDURE — 80150 ASSAY OF AMIKACIN: CPT

## 2019-10-29 PROCEDURE — P9047 ALBUMIN (HUMAN), 25%, 50ML: HCPCS | Mod: JG | Performed by: INTERNAL MEDICINE

## 2019-10-29 PROCEDURE — 27201423 OPTIME MED/SURG SUP & DEVICES STERILE SUPPLY: Performed by: STUDENT IN AN ORGANIZED HEALTH CARE EDUCATION/TRAINING PROGRAM

## 2019-10-29 PROCEDURE — 82040 ASSAY OF SERUM ALBUMIN: CPT

## 2019-10-29 PROCEDURE — 36000708 HC OR TIME LEV III 1ST 15 MIN: Performed by: STUDENT IN AN ORGANIZED HEALTH CARE EDUCATION/TRAINING PROGRAM

## 2019-10-29 RX ORDER — GLYCOPYRROLATE 0.2 MG/ML
INJECTION INTRAMUSCULAR; INTRAVENOUS
Status: DISCONTINUED | OUTPATIENT
Start: 2019-10-29 | End: 2019-10-29

## 2019-10-29 RX ORDER — NEOSTIGMINE METHYLSULFATE 1 MG/ML
INJECTION, SOLUTION INTRAVENOUS
Status: DISCONTINUED | OUTPATIENT
Start: 2019-10-29 | End: 2019-10-29

## 2019-10-29 RX ORDER — ONDANSETRON 2 MG/ML
INJECTION INTRAMUSCULAR; INTRAVENOUS
Status: DISCONTINUED | OUTPATIENT
Start: 2019-10-29 | End: 2019-10-29

## 2019-10-29 RX ORDER — METRONIDAZOLE 500 MG/100ML
500 INJECTION, SOLUTION INTRAVENOUS
Status: DISCONTINUED | OUTPATIENT
Start: 2019-10-29 | End: 2019-10-30

## 2019-10-29 RX ORDER — BUPIVACAINE HYDROCHLORIDE 5 MG/ML
INJECTION, SOLUTION PERINEURAL
Status: DISCONTINUED | OUTPATIENT
Start: 2019-10-29 | End: 2019-10-29 | Stop reason: HOSPADM

## 2019-10-29 RX ORDER — ETOMIDATE 2 MG/ML
INJECTION INTRAVENOUS
Status: DISCONTINUED | OUTPATIENT
Start: 2019-10-29 | End: 2019-10-29

## 2019-10-29 RX ORDER — SUCCINYLCHOLINE CHLORIDE 20 MG/ML
INJECTION INTRAMUSCULAR; INTRAVENOUS
Status: DISCONTINUED | OUTPATIENT
Start: 2019-10-29 | End: 2019-10-29

## 2019-10-29 RX ORDER — ROCURONIUM BROMIDE 10 MG/ML
INJECTION, SOLUTION INTRAVENOUS
Status: DISCONTINUED | OUTPATIENT
Start: 2019-10-29 | End: 2019-10-29

## 2019-10-29 RX ORDER — ALBUMIN HUMAN 250 G/1000ML
12.5 SOLUTION INTRAVENOUS ONCE
Status: COMPLETED | OUTPATIENT
Start: 2019-10-29 | End: 2019-10-29

## 2019-10-29 RX ORDER — PHENYLEPHRINE HYDROCHLORIDE 10 MG/ML
INJECTION INTRAVENOUS
Status: DISCONTINUED | OUTPATIENT
Start: 2019-10-29 | End: 2019-10-29

## 2019-10-29 RX ORDER — FENTANYL CITRATE 50 UG/ML
INJECTION, SOLUTION INTRAMUSCULAR; INTRAVENOUS
Status: DISCONTINUED | OUTPATIENT
Start: 2019-10-29 | End: 2019-10-29

## 2019-10-29 RX ORDER — SODIUM CHLORIDE, SODIUM LACTATE, POTASSIUM CHLORIDE, CALCIUM CHLORIDE 600; 310; 30; 20 MG/100ML; MG/100ML; MG/100ML; MG/100ML
INJECTION, SOLUTION INTRAVENOUS CONTINUOUS PRN
Status: DISCONTINUED | OUTPATIENT
Start: 2019-10-29 | End: 2019-10-29

## 2019-10-29 RX ORDER — VASOPRESSIN 20 [USP'U]/ML
INJECTION, SOLUTION INTRAMUSCULAR; SUBCUTANEOUS
Status: DISCONTINUED | OUTPATIENT
Start: 2019-10-29 | End: 2019-10-29

## 2019-10-29 RX ADMIN — VASOPRESSIN 2 UNITS: 20 INJECTION, SOLUTION INTRAMUSCULAR; SUBCUTANEOUS at 11:10

## 2019-10-29 RX ADMIN — SODIUM CHLORIDE, SODIUM LACTATE, POTASSIUM CHLORIDE, AND CALCIUM CHLORIDE: .6; .31; .03; .02 INJECTION, SOLUTION INTRAVENOUS at 10:10

## 2019-10-29 RX ADMIN — Medication 0.9 MCG/KG/MIN: at 06:10

## 2019-10-29 RX ADMIN — AMIKACIN SULFATE 400 MG: 500 INJECTION, SOLUTION INTRAMUSCULAR; INTRAVENOUS at 12:10

## 2019-10-29 RX ADMIN — METRONIDAZOLE 500 MG: 500 INJECTION, SOLUTION INTRAVENOUS at 11:10

## 2019-10-29 RX ADMIN — ONDANSETRON 4 MG: 2 INJECTION, SOLUTION INTRAMUSCULAR; INTRAVENOUS at 11:10

## 2019-10-29 RX ADMIN — VASOPRESSIN 2 UNITS: 20 INJECTION, SOLUTION INTRAMUSCULAR; SUBCUTANEOUS at 10:10

## 2019-10-29 RX ADMIN — METRONIDAZOLE 500 MG: 500 INJECTION, SOLUTION INTRAVENOUS at 03:10

## 2019-10-29 RX ADMIN — CEFEPIME HYDROCHLORIDE 2 G: 2 INJECTION, POWDER, FOR SOLUTION INTRAVENOUS at 01:10

## 2019-10-29 RX ADMIN — ROCURONIUM BROMIDE 30 MG: 10 INJECTION, SOLUTION INTRAVENOUS at 10:10

## 2019-10-29 RX ADMIN — PHENYLEPHRINE HYDROCHLORIDE 200 MCG: 10 INJECTION INTRAVENOUS at 11:10

## 2019-10-29 RX ADMIN — SODIUM CHLORIDE, SODIUM LACTATE, POTASSIUM CHLORIDE, AND CALCIUM CHLORIDE: .6; .31; .03; .02 INJECTION, SOLUTION INTRAVENOUS at 04:10

## 2019-10-29 RX ADMIN — PHENOBARBITAL 64.8 MG: 32.4 TABLET ORAL at 09:10

## 2019-10-29 RX ADMIN — ETOMIDATE 14 MG: 2 INJECTION, SOLUTION INTRAVENOUS at 10:10

## 2019-10-29 RX ADMIN — PHENYLEPHRINE HYDROCHLORIDE 300 MCG: 10 INJECTION INTRAVENOUS at 10:10

## 2019-10-29 RX ADMIN — Medication 1.1 MCG/KG/MIN: at 04:10

## 2019-10-29 RX ADMIN — PHENYLEPHRINE HYDROCHLORIDE 200 MCG: 10 INJECTION INTRAVENOUS at 09:10

## 2019-10-29 RX ADMIN — PHENYLEPHRINE HYDROCHLORIDE 100 MCG: 10 INJECTION INTRAVENOUS at 11:10

## 2019-10-29 RX ADMIN — FENTANYL CITRATE 50 MCG: 50 INJECTION, SOLUTION INTRAMUSCULAR; INTRAVENOUS at 10:10

## 2019-10-29 RX ADMIN — OXCARBAZEPINE 300 MG: 150 TABLET, FILM COATED ORAL at 09:10

## 2019-10-29 RX ADMIN — GLYCOPYRROLATE 0.8 MG: 0.2 INJECTION, SOLUTION INTRAMUSCULAR; INTRAVENOUS at 11:10

## 2019-10-29 RX ADMIN — PHENYLEPHRINE HYDROCHLORIDE 200 MCG: 10 INJECTION INTRAVENOUS at 10:10

## 2019-10-29 RX ADMIN — SUCCINYLCHOLINE CHLORIDE 140 MG: 20 INJECTION, SOLUTION INTRAMUSCULAR; INTRAVENOUS at 10:10

## 2019-10-29 RX ADMIN — SODIUM CHLORIDE, SODIUM LACTATE, POTASSIUM CHLORIDE, AND CALCIUM CHLORIDE: .6; .31; .03; .02 INJECTION, SOLUTION INTRAVENOUS at 09:10

## 2019-10-29 RX ADMIN — Medication 0.9 MCG/KG/MIN: at 10:10

## 2019-10-29 RX ADMIN — MIRTAZAPINE 7.5 MG: 7.5 TABLET ORAL at 09:10

## 2019-10-29 RX ADMIN — NEOSTIGMINE METHYLSULFATE 4 MG: 1 INJECTION INTRAVENOUS at 11:10

## 2019-10-29 RX ADMIN — LEVOTHYROXINE SODIUM 125 MCG: 25 TABLET ORAL at 05:10

## 2019-10-29 RX ADMIN — Medication 1.15 MCG/KG/MIN: at 01:10

## 2019-10-29 RX ADMIN — ALBUMIN (HUMAN) 12.5 G: 25 SOLUTION INTRAVENOUS at 07:10

## 2019-10-29 NOTE — PROGRESS NOTES
2230 - Pt keeps reaching for and pulling on her NGT. Restraints ordered to prevent pt from pulling out NGT.    2233 - Spoke to physician from eICU about pt not having any scheduled antibiotics despite positive blood cultures. Aware of allergy to penicillin and pt's urine being very brown and minute. Also states after pt receive fluid bolus, if pt's UO does not increase to 30 in the hour, to update her.

## 2019-10-29 NOTE — ASSESSMENT & PLAN NOTE
Fecal obstruction  Gram-negative bacteremia  On antibiotics. Change amikacin to metronidazole. Status post sigmoidectomy. Appreciate General Surgery. Still intubated. Consult Pulmonology to wean off ventilator.

## 2019-10-29 NOTE — PROGRESS NOTES
Dr. Johnson updated on the efficacy of the enema treatments, as well as the need to titrate up the levophed.

## 2019-10-29 NOTE — PROGRESS NOTES
Pharmacokinetic Initial Assessment: Amikacin    Assessment:  Weight utilized for dose calculation: Adjusted Body Weight  Dosing method utilized: conventional dosing (not a candidate for extended interval due to severe renal impairment (CrCl <30 mL/min))    Plan: Traditional dosing regimen: Amikacin 400 mg IV once, followed by a serum peak level to be drawn on 10-29 at 01:00 30 min after the end of the first dose for calculation of Vd (if necessary). Goal Peak level is 25-30 mcg/mL. Trough Monitoring to follow.    Pharmacy will continue to monitor.    Please contact pharmacy at extension [phone] with any questions regarding this assessment.    Thank you for the consult,    Tone Vázquez       Patient brief summary:  Annette Garcia is a 84 y.o. female initiated on aminoglycoside therapy for treatment of suspected bacteremia    Of note: for dosing recommendations in consults related to pulmonary exacerbations due to Cystic Fibrosis, please contact Infectious Disease (ID) or contact [facility] pharmacy.    Drug Allergies:   Review of patient's allergies indicates:   Allergen Reactions    Adhesive Itching and Blisters    Penicillins Anaphylaxis    Tramadol Hives    Avelox [moxifloxacin] Rash     Facial and arm itching and redness. Pt states throat closes when given.    Amoxil [amoxicillin]     Aspridrox [aspirin, buffered]     Codeine Other (See Comments)     Throat swelling    Keflex [cephalexin]     Norvasc [amlodipine]     Red dye Hives    Robitussin [guaifenesin]     Sulfa (sulfonamide antibiotics)     Tylenol [acetaminophen]      Has reaction to Tylenol with red dye and unable to take Extra Strength Tylenol/ CAN ONLY TOLERATE REG STRENGTH TYLENOL    Vicks vaporub [camphor-eucalyptus oil-menthol]        Actual Body Weight:   65.2 kg      Adjust Body Weight:   53.4 kg    Ideal Body Weight:  45.5 kg    Renal Function:   Estimated Creatinine Clearance: 15.2 mL/min (A) (based on SCr of 2.4 mg/dL (H)).,     Dialysis Method (if  applicable):  N/A    CBC (last 72 hours):  Recent Labs   Lab Result Units 10/27/19  2207 10/28/19  1724   WBC K/uL 5.82 5.04   Hemoglobin g/dL 15.1 12.0   Hematocrit % 44.8 35.8*   Platelets K/uL 170 139*   Gran% % 80.5* 76.0*   Lymph% % 14.9* 19.0   Mono% % 4.1 4.8   Eosinophil% % 0.3 0.2   Basophil% % 0.2 0.0   Differential Method  Automated Automated       Metabolic Panel (last 72 hours):  Recent Labs   Lab Result Units 10/28/19  0046 10/28/19  0410 10/28/19  1351 10/28/19  1724   Sodium mmol/L 131*  --  131* 131*   Potassium mmol/L 4.6  --  4.6 4.8   Chloride mmol/L 95  --  97 97   CO2 mmol/L 21*  --  23 23   Glucose mg/dL 154*  --  127* 122*   Glucose, UA   --  Negative  --  Negative   BUN, Bld mg/dL 27*  --  38* 40*   Creatinine mg/dL 1.6*  --  2.3* 2.4*   Albumin g/dL 3.6  --   --   --    Total Bilirubin mg/dL 1.1*  --   --   --    Alkaline Phosphatase U/L 57  --   --   --    AST U/L 487*  --   --   --    ALT U/L 361*  --   --   --    Magnesium mg/dL  --   --   --  4.9*   Phosphorus mg/dL  --   --  4.9*  --        Microbiologic Results:  Microbiology Results (last 7 days)       Procedure Component Value Units Date/Time    Blood Culture #1 **CANNOT BE ORDERED STAT** [[id number]] Collected:  10/28/19 0049    Order Status:  Completed Specimen:  Blood from Peripheral, Right  Wrist Updated:  10/28/19 1901     Blood Culture, Routine Gram stain aer bottle: Gram negative rods       Results called to and read back by: Kinga Bateman RN  10/28/2019  19:01    Blood Culture #2 **CANNOT BE ORDERED STAT** [[id number]] Collected:  10/28/19 0049    Order Status:  Sent Specimen:  Blood from Peripheral, Hand, Left Updated:  10/28/19 0050

## 2019-10-29 NOTE — PT/OT/SLP PROGRESS
Physical Therapy      Patient Name:  Annette Garcia   MRN:  [id number]    Patient not seen today secondary to RICO to sx. Will follow-up .    Kinga Velásquez, PT   10/29/2019

## 2019-10-29 NOTE — ASSESSMENT & PLAN NOTE
84-year-old female with abdominal pain with distended colon and increased stool burden on CT without acute changes. However, exam is worse today and now has pressor requirement with gram negative rods in blood.   -plan for exploratory laparotomy today.

## 2019-10-29 NOTE — ANESTHESIA PREPROCEDURE EVALUATION
10/29/2019  Annette Garcia is a 84 y.o., female.  Patient Active Problem List   Diagnosis    Renovascular hypertension    Epilepsy    Acquired hypothyroidism    Osteoarthritis of lumbar spine    Degenerative joint disease of sacroiliac joint    S/P exploratory laparotomy    DLBCL (diffuse large B cell lymphoma)    Chronic hyponatremia    Anemia due to antineoplastic chemotherapy    Bilateral renal artery stenosis    Closed comminuted fracture of right humerus    Closed displaced fracture of distal phalanx of right little finger    Age-related osteoporosis with current pathological fracture with routine healing    Closed wedge compression fracture of eleventh thoracic vertebra with routine healing    DDD (degenerative disc disease), lumbar    Chronic bilateral low back pain without sciatica    Age-related osteoporosis without current pathological fracture    Vitamin D deficiency    Uncontrolled hypertension    Subjective memory complaints    Depression    Lactic acidosis    Abnormal liver enzymes    Old MI (myocardial infarction)    Peripheral vascular disease, unspecified    History of stent insertion of left renal artery 7/19/17    Enteritis    Slow transit constipation    Fecal obstruction       Anesthesia Evaluation      I have reviewed the Medications.     Review of Systems  Anesthesia Hx:  Denies Family Hx of Anesthesia complications.   Cardiovascular:   Hypertension Past MI     Musculoskeletal:   Arthritis     Neurological:   Seizures    Endocrine:   Hypothyroidism    Psych:   Psychiatric History          Physical Exam  General:  Well nourished    Airway/Jaw/Neck:  Airway Findings: Mouth Opening: Normal Tongue: Normal  General Airway Assessment: Adult  Mallampati: II      Dental:  Dental Findings: In tact   Chest/Lungs:  Chest/Lungs Findings: Clear to auscultation,  Normal Respiratory Rate     Heart/Vascular:  Heart Findings: Rate: Normal  Rhythm: Regular Rhythm        Mental Status:  Mental Status Findings:  Cooperative, Alert and Oriented         Anesthesia Plan  Type of Anesthesia, risks & benefits discussed:  Anesthesia Type:  general  Patient's Preference: general  Intra-op Monitoring Plan:   Intra-op Monitoring Plan Comments:   Post Op Pain Control Plan:   Post Op Pain Control Plan Comments:   Induction:   IV  Beta Blocker:         Informed Consent: Patient understands risks and agrees with Anesthesia plan.  Questions answered. Anesthesia consent signed with patient.  ASA Score: 3     Day of Surgery Review of History & Physical:  There are no significant changes.          Ready For Surgery From Anesthesia Perspective.

## 2019-10-29 NOTE — PLAN OF CARE
Pt will have received two enemas this shift. With the first enema, and the ones prior that were given by day shift, pt has not had a major bowel movement. Pt's abdomen remains both distended and very tender to light touch. Pt otherwise has no complaints of pain. After the administration of seizure medications PO, pt's levophed needed to be increased to keep her MAP above 65 mmHg. Otherwise, her heart rate has been stable. Pt has fluctuations in her oxygen saturation, remedied by administering O2 at 2 liters while asleep. Pt exemplifies no signs of anaphylaxis since having been given cefepime. Antibiotics in general have been started for this pt during this shift as she tested positive for gram negative rods in her blood cultures. Pt's urine output has been remedied at a consistent 30-40 cc/hr since having been given a saline bolus, although her urine remains dark brown.

## 2019-10-29 NOTE — PROGRESS NOTES
Progress Note  Nephrology      Consult Requested By: José Manuel Guidry MD      SUBJECTIVE:     Overnight events  Patient is a 84 y.o. female     Patient Active Problem List   Diagnosis    Renovascular hypertension    Epilepsy    Acquired hypothyroidism    Osteoarthritis of lumbar spine    Degenerative joint disease of sacroiliac joint    S/P exploratory laparotomy    DLBCL (diffuse large B cell lymphoma)    Chronic hyponatremia    Anemia due to antineoplastic chemotherapy    Bilateral renal artery stenosis    Closed comminuted fracture of right humerus    Closed displaced fracture of distal phalanx of right little finger    Age-related osteoporosis with current pathological fracture with routine healing    Closed wedge compression fracture of eleventh thoracic vertebra with routine healing    DDD (degenerative disc disease), lumbar    Chronic bilateral low back pain without sciatica    Age-related osteoporosis without current pathological fracture    Vitamin D deficiency    Uncontrolled hypertension    Subjective memory complaints    Depression    Lactic acidosis    Abnormal liver enzymes    Old MI (myocardial infarction)    Peripheral vascular disease, unspecified    History of stent insertion of left renal artery 7/19/17    Colon necrosis    Slow transit constipation    Fecal obstruction    Gram-negative bacteremia    Abdominal pain     Past Medical History:   Diagnosis Date    Cancer     colon    Hypertension     Petit mal epilepsy 1954    Scoliosis of lumbar spine     Seizures     Unspecified hypothyroidism               OBJECTIVE:     Vitals:    10/29/19 1345 10/29/19 1400 10/29/19 1415 10/29/19 1430   BP: (!) 92/50 (!) 90/52 (!) 94/51 (!) 95/54   Pulse: 80 79 79 79   Resp: (!) 21 (!) 30 (!) 22 (!) 21   Temp:       TempSrc:       SpO2: 98% 98% 99% 100%   Weight:       Height:           Temp: 97.7 °F (36.5 °C) (10/29/19 0800)  Pulse: 79 (10/29/19 1430)  Resp: (!) 21 (10/29/19  1430)  BP: (!) 95/54 (10/29/19 1430)  SpO2: 100 % (10/29/19 1430)    Date 10/29/19 0700 - 10/30/19 0659   Shift 8567-3803 3061-6870 7920-0345 24 Hour Total   INTAKE   I.V.(mL/kg) 900(13)   900(13)   Shift Total(mL/kg) 900(13)   900(13)   OUTPUT   Urine(mL/kg/hr) 150(0.3)   150   Shift Total(mL/kg) 150(2.2)   150(2.2)   Weight (kg) 69 69 69 69             Medications:   ceFEPime (MAXIPIME) IVPB  2 g Intravenous Q24H    levothyroxine  125 mcg Oral Before breakfast    metronidazole  500 mg Intravenous Q8H    mirtazapine  7.5 mg Oral QHS    OXcarbazepine  300 mg Oral QHS    PHENobarbital  64.8 mg Oral Nightly    pravastatin  20 mg Oral Daily      lactated ringers 150 mL/hr at 10/29/19 1316    norepinephrine bitartrate-D5W 1.05 mcg/kg/min (10/29/19 1509)               Physical Exam:     Sedated  Lungs: diminished breath sounds  Intubated on 60 % O2  Heart: pulse 79  Abdomen: soft  Extremities: edema  Skin: dry  Laboratory:  ABG  Labs reviewed  Recent Results (from the past 336 hour(s))   Basic Metabolic Panel (BMP)    Collection Time: 10/29/19  4:42 AM   Result Value Ref Range    Sodium 127 (L) 136 - 145 mmol/L    Potassium 5.0 3.5 - 5.1 mmol/L    Chloride 95 95 - 110 mmol/L    CO2 21 (L) 23 - 29 mmol/L    BUN, Bld 44 (H) 8 - 23 mg/dL    Creatinine 2.1 (H) 0.5 - 1.4 mg/dL    Calcium 8.2 (L) 8.7 - 10.5 mg/dL    Anion Gap 11 8 - 16 mmol/L   Basic metabolic panel    Collection Time: 10/28/19  5:24 PM   Result Value Ref Range    Sodium 131 (L) 136 - 145 mmol/L    Potassium 4.8 3.5 - 5.1 mmol/L    Chloride 97 95 - 110 mmol/L    CO2 23 23 - 29 mmol/L    BUN, Bld 40 (H) 8 - 23 mg/dL    Creatinine 2.4 (H) 0.5 - 1.4 mg/dL    Calcium 8.8 8.7 - 10.5 mg/dL    Anion Gap 11 8 - 16 mmol/L   Basic metabolic panel    Collection Time: 10/28/19  1:51 PM   Result Value Ref Range    Sodium 131 (L) 136 - 145 mmol/L    Potassium 4.6 3.5 - 5.1 mmol/L    Chloride 97 95 - 110 mmol/L    CO2 23 23 - 29 mmol/L    BUN, Bld 38 (H) 8 - 23  mg/dL    Creatinine 2.3 (H) 0.5 - 1.4 mg/dL    Calcium 8.9 8.7 - 10.5 mg/dL    Anion Gap 11 8 - 16 mmol/L     Recent Results (from the past 336 hour(s))   CBC with Automated Differential    Collection Time: 10/29/19  4:42 AM   Result Value Ref Range    WBC 2.65 (L) 3.90 - 12.70 K/uL    Hemoglobin 11.9 (L) 12.0 - 16.0 g/dL    Hematocrit 36.0 (L) 37.0 - 48.5 %    Platelets 126 (L) 150 - 350 K/uL   CBC auto differential    Collection Time: 10/28/19  5:24 PM   Result Value Ref Range    WBC 5.04 3.90 - 12.70 K/uL    Hemoglobin 12.0 12.0 - 16.0 g/dL    Hematocrit 35.8 (L) 37.0 - 48.5 %    Platelets 139 (L) 150 - 350 K/uL   CBC auto differential    Collection Time: 10/27/19 10:07 PM   Result Value Ref Range    WBC 5.82 3.90 - 12.70 K/uL    Hemoglobin 15.1 12.0 - 16.0 g/dL    Hematocrit 44.8 37.0 - 48.5 %    Platelets 170 150 - 350 K/uL     Urinalysis  Recent Labs   Lab 10/28/19  1724   COLORU Orange*   SPECGRAV 1.025   PHUR 5.0   PROTEINUA 2+*   BACTERIA Many*   NITRITE Positive*   LEUKOCYTESUR Negative   UROBILINOGEN 2.0-3.0*   HYALINECASTS 0       Diagnostic Results:  X-Ray: Reviewed  US: Reviewed  Echo: Reviewed  ACCESS    ASSESSMENT/PLAN:     S/p Exploratory laparotomy, sigmoid colectomy, end colostomy  JAS   cc today  Lactic acid 3.6  Hyponatremia  Potassium 5  Metabolic acidosis  Anemia  Hb 11.9  Poor nutrition  Hypotension  Blood pressure 95/54  On norepinephrine  Continue LR

## 2019-10-29 NOTE — NURSING TRANSFER
Nursing Transfer Note      10/29/2019     Transfer To: Surgery    Transfer via bed    Transfer with cardiac monitoring    Transported by surgery team    Chart send with patient: Yes    Notified: daughter

## 2019-10-29 NOTE — EICU
Gram negative rods identified on blood culture possible GI source     Received cefepime 2 gm IVPB 10 hours ago in ED(has h/o of penicillin allergy documented )-no rash or reaction so far    Cr clearance 15.2  UO minimal   BP99/49    Plan:  NS 500ml once  Cefepime 2gm IVPB every 24 hours for 5 days   Amikacin 7.5mg/kg IVPB every 12 hours with first dose now for 5 days  Adjust antibiotics as per creatinine clearance daily  deescalate antibiotics as needed

## 2019-10-29 NOTE — PROGRESS NOTES
[facility]  General Surgery  Progress Note    Subjective:     History of Present Illness:  No notes on file    Post-Op Info:  Procedure(s) (LRB):  LAPAROTOMY, EXPLORATORY (N/A)  COLECTOMY, SIGMOID WITH END COLOSTOMY (N/A)   Day of Surgery     Interval History: Worsening pain. Altered. Increased pressor requirement.     Medications:  Continuous Infusions:   lactated ringers 150 mL/hr at 10/29/19 1316    norepinephrine bitartrate-D5W 1.15 mcg/kg/min (10/29/19 1355)     Scheduled Meds:   [START ON 10/30/2019] amikacin  425 mg Intravenous Q24H    ceFEPime (MAXIPIME) IVPB  2 g Intravenous Q24H    levothyroxine  125 mcg Oral Before breakfast    mirtazapine  7.5 mg Oral QHS    OXcarbazepine  300 mg Oral QHS    PHENobarbital  64.8 mg Oral Nightly    pravastatin  20 mg Oral Daily     PRN Meds:acetaminophen, albuterol-ipratropium, ondansetron, promethazine (PHENERGAN) IVPB, ramelteon, sodium chloride 0.9%     Review of patient's allergies indicates:   Allergen Reactions    Adhesive Itching and Blisters    Penicillins Anaphylaxis    Tramadol Hives    Avelox [moxifloxacin] Rash     Facial and arm itching and redness. Pt states throat closes when given.    Amoxil [amoxicillin]     Aspridrox [aspirin, buffered]     Codeine Other (See Comments)     Throat swelling    Keflex [cephalexin]     Norvasc [amlodipine]     Red dye Hives    Robitussin [guaifenesin]     Sulfa (sulfonamide antibiotics)     Tylenol [acetaminophen]      Has reaction to Tylenol with red dye and unable to take Extra Strength Tylenol/ CAN ONLY TOLERATE REG STRENGTH TYLENOL    Vicks vaporub [camphor-eucalyptus oil-menthol]      Objective:     Vital Signs (Most Recent):  Temp: 97.7 °F (36.5 °C) (10/29/19 0800)  Pulse: 81 (10/29/19 1315)  Resp: 20 (10/29/19 1315)  BP: (!) 99/53 (10/29/19 1315)  SpO2: 96 % (10/29/19 1315) Vital Signs (24h Range):  Temp:  [97.5 °F (36.4 °C)-98.6 °F (37 °C)] 97.7 °F (36.5 °C)  Pulse:  []  81  Resp:  [9-43] 20  SpO2:  [85 %-99 %] 96 %  BP: ()/(34-57) 99/53     Weight: 69 kg (152 lb 1.9 oz)  Body mass index is 29.71 kg/m².    Intake/Output - Last 3 Shifts       10/27 0700 - 10/28 0659 10/28 0700 - 10/29 0659 10/29 0700 - 10/30 0659    I.V. (mL/kg) 63 (1) 3307 (47.9) 900 (13)    NG/GT  200     IV Piggyback 2550 50     Total Intake(mL/kg) 2613 (40.1) 3557 (51.6) 900 (13)    Urine (mL/kg/hr)  442 (0.3) 150 (0.3)    Drains  1050     Stool  0 0    Total Output  1492 150    Net +2613 +2065 +750           Stool Occurrence 1 x 1 x 1 x          Physical Exam   Cardiovascular: Regular rhythm. Tachycardia present.   Pulmonary/Chest: Effort normal. No respiratory distress.   Abdominal: She exhibits distension. There is tenderness. There is guarding.   Nursing note and vitals reviewed.      Significant Labs:  CBC:   Recent Labs   Lab 10/29/19  0442   WBC 2.65*   RBC 3.59*   HGB 11.9*   HCT 36.0*   *   *   MCH 33.1*   MCHC 33.1     LFTs:   Recent Labs   Lab 10/28/19  0046   *   *   ALKPHOS 57   BILITOT 1.1*   PROT 6.1   ALBUMIN 3.6     Coagulation: No results for input(s): LABPROT, INR, APTT in the last 168 hours.    Significant Diagnostics:  I have reviewed all pertinent imaging results/findings within the past 24 hours.    Assessment/Plan:     Abdominal pain  84-year-old female with abdominal pain with distended colon and increased stool burden on CT without acute changes. However, exam is worse today and now has pressor requirement with gram negative rods in blood.   -plan for exploratory laparotomy today.         Charles Dumont MD  General Surgery  [facility]

## 2019-10-29 NOTE — SUBJECTIVE & OBJECTIVE
Interval History: Worsening pain. Altered. Increased pressor requirement.     Medications:  Continuous Infusions:   lactated ringers 150 mL/hr at 10/29/19 1316    norepinephrine bitartrate-D5W 1.15 mcg/kg/min (10/29/19 1355)     Scheduled Meds:   [START ON 10/30/2019] amikacin  425 mg Intravenous Q24H    ceFEPime (MAXIPIME) IVPB  2 g Intravenous Q24H    levothyroxine  125 mcg Oral Before breakfast    mirtazapine  7.5 mg Oral QHS    OXcarbazepine  300 mg Oral QHS    PHENobarbital  64.8 mg Oral Nightly    pravastatin  20 mg Oral Daily     PRN Meds:acetaminophen, albuterol-ipratropium, ondansetron, promethazine (PHENERGAN) IVPB, ramelteon, sodium chloride 0.9%     Review of patient's allergies indicates:   Allergen Reactions    Adhesive Itching and Blisters    Penicillins Anaphylaxis    Tramadol Hives    Avelox [moxifloxacin] Rash     Facial and arm itching and redness. Pt states throat closes when given.    Amoxil [amoxicillin]     Aspridrox [aspirin, buffered]     Codeine Other (See Comments)     Throat swelling    Keflex [cephalexin]     Norvasc [amlodipine]     Red dye Hives    Robitussin [guaifenesin]     Sulfa (sulfonamide antibiotics)     Tylenol [acetaminophen]      Has reaction to Tylenol with red dye and unable to take Extra Strength Tylenol/ CAN ONLY TOLERATE REG STRENGTH TYLENOL    Vicks vaporub [camphor-eucalyptus oil-menthol]      Objective:     Vital Signs (Most Recent):  Temp: 97.7 °F (36.5 °C) (10/29/19 0800)  Pulse: 81 (10/29/19 1315)  Resp: 20 (10/29/19 1315)  BP: (!) 99/53 (10/29/19 1315)  SpO2: 96 % (10/29/19 1315) Vital Signs (24h Range):  Temp:  [97.5 °F (36.4 °C)-98.6 °F (37 °C)] 97.7 °F (36.5 °C)  Pulse:  [] 81  Resp:  [9-43] 20  SpO2:  [85 %-99 %] 96 %  BP: ()/(34-57) 99/53     Weight: 69 kg (152 lb 1.9 oz)  Body mass index is 29.71 kg/m².    Intake/Output - Last 3 Shifts       10/27 0700 - 10/28 0659 10/28 0700 - 10/29 0659 10/29 0700 - 10/30 0659    I.V.  (mL/kg) 63 (1) 3307 (47.9) 900 (13)    NG/GT  200     IV Piggyback 2550 50     Total Intake(mL/kg) 2613 (40.1) 3557 (51.6) 900 (13)    Urine (mL/kg/hr)  442 (0.3) 150 (0.3)    Drains  1050     Stool  0 0    Total Output  1492 150    Net +2613 +2065 +750           Stool Occurrence 1 x 1 x 1 x          Physical Exam   Cardiovascular: Regular rhythm. Tachycardia present.   Pulmonary/Chest: Effort normal. No respiratory distress.   Abdominal: She exhibits distension. There is tenderness. There is guarding.   Nursing note and vitals reviewed.      Significant Labs:  CBC:   Recent Labs   Lab 10/29/19  0442   WBC 2.65*   RBC 3.59*   HGB 11.9*   HCT 36.0*   *   *   MCH 33.1*   MCHC 33.1     LFTs:   Recent Labs   Lab 10/28/19  0046   *   *   ALKPHOS 57   BILITOT 1.1*   PROT 6.1   ALBUMIN 3.6     Coagulation: No results for input(s): LABPROT, INR, APTT in the last 168 hours.    Significant Diagnostics:  I have reviewed all pertinent imaging results/findings within the past 24 hours.

## 2019-10-29 NOTE — OP NOTE
OPERATIVE NOTE    Preoperative Diagnosis:  Acute abdomen    Procedure:  Exploratory laparotomy, sigmoid colectomy, end colostomy    Postoperative Diagnosis: bowel necrosis of sigmoid colon without obvious volvulus, obstruction, or perforation    Surgery Date: 10/29/2019    Attending: NESSA Johnson MD     Assisting: ENMANUEL Dumont MD    Anesthesia: General    EBL: See anesthesia note    Specimen: sigmoid colon    Findings: sharply demarcated sigmoid colon necrosis with large amounts of turbid, murky fluid in abdomen.    Implants/Drains: none    Complications: None    Indications: 84-year-old female with abdominal pain associated with hemodynamic instability. Despite no acute disease seen on CT, patient's family was offered exploratory laparotomy given worsening exam, confusion, leukopenia, and increasing pressor requirements. After discussion of treatment options, risks and benefits of the current procedure, the patient's family elected to proceed for an exploratory laparotomy with informed consent.    Description of Procedure: The patient was identified by name and date of birth in the SICU. She was brought to the operating room and placed on the OR table in the supine position. After a time out, anesthesia was induced without incident. The patient's abdomen was prepped and draped in the usual standard fashion. Following a final time out, a midline laparotomy incision was performed with a scalpel and carried down to fascia. Fascia was cautiously opened sharply and the abdomen was found to be free of adhesions. Immediately we encountered turbid, murky fluid and grossly necrotic sigmoid colon with sharp demarcations distally just above the peritoneal reflection and proximally at the beginning of the descending colon. Although a possible cause includes volvulus, no obvious volvulus was found. Healthy descending colon was identified and a window was made in the mesentery. Through this window, an endoGIA stapler was passed and  fired. A ligasure was then used across the mesentery of the necrotic sigmoid colon down to the peritoneal reflection. Additional left and right lateral aspects of the peritoneal reflection overlying the rectosigmoid junction were incised with cautery until a healthy aspect of rectum was encountered circumferentially. We then used a contour stapler across this area of healthy rectum and fired. At this point, the specimen was passed off the field. The abdomen was then explored further. The small bowel was ran from the cecum to the ligament of treitz and appeared healthy. All remaining portions of intraabdominal colon appeared healthy and viable. Stomach was viable. At this point, the abdomen was copiously irrigated with several liters of warm saline until effluent was clear. We then made a circular incision over the skin several centimeters left of the midline incision at the level of the umbilicus and carried this through the fascia. We then brought up the stapled off descending colon through this skin incision and covered it with a sterile towel. Next, we used PDS to close fascia in a running fashion followed by a skin closure using staples. A sterile dressing was applied to the midline wound after irrigation. We then formed a colostomy by removing the staple line over the previously stapled end of descending colon with cautery. Mucosa appeared necrotic but there was excellent black bleeding the remaining tissue was viable. A colostomy was then matured with several interrupted vicryl sutures. A colostomy bag was applied and the skin was cleaned and dried.     The patient tolerated the procedure well. There were no complications.   was present for the entire operation.     Disposition: Intubated. Taken to SICU.

## 2019-10-29 NOTE — PT/OT/SLP PROGRESS
Occupational Therapy  Missed Visit    Patient Name:  Annette Garcia   MRN:  [id number]    Patient not seen today secondary to  RICO in OR. Will follow-up .    Ronnie Sutton OT  10/29/2019

## 2019-10-29 NOTE — BRIEF OP NOTE
[facility]  Brief Operative Note    SUMMARY     Surgery Date: 10/29/2019     Surgeon(s) and Role:     * Chris Johnson MD - Primary    Assisting Surgeon: Charles Dumont MD    Pre-op Diagnosis:  Fecal obstruction [K56.41]    Post-op Diagnosis:  Post-Op Diagnosis Codes:     * Fecal obstruction [K56.41]    Procedure(s) (LRB):  LAPAROTOMY, EXPLORATORY (N/A)  COLECTOMY, SIGMOID WITH END COLOSTOMY (N/A)    Anesthesia: General    Description of Procedure: exploratory laparotomy, sigmoid colectomy, end colostomy.     Description of the findings of the procedure: necrotic sigmoid colon found with sharp demarcations proximally and distally. Essentially no adhesive disease. No evidence of volvulus. Necrotic sigmoid resected and end colostomy matured. Fascia closed and skin staples used on skin.    Estimated Blood Loss: see anesthesia note         Specimens:   Specimen (12h ago, onward)     Start     Ordered    10/29/19 1147  Specimen to Pathology - Surgery  Once     Comments:  Pre-op Diagnosis: Fecal obstruction [K56.41]Procedure(s):LAPAROTOMY, EXPLORATORY Number of specimens: 1Name of specimens: 1. Sigmoid Colon==Perm      10/29/19 1150

## 2019-10-29 NOTE — SUBJECTIVE & OBJECTIVE
Interval History: Patient's sister, brother, and sister-in-law in the room, updated on plan.    Review of Systems   Unable to perform ROS: Intubated     Objective:     Vital Signs (Most Recent):  Temp: 97.7 °F (36.5 °C) (10/29/19 0800)  Pulse: 79 (10/29/19 1430)  Resp: (!) 21 (10/29/19 1430)  BP: (!) 95/54 (10/29/19 1430)  SpO2: 100 % (10/29/19 1430) Vital Signs (24h Range):  Temp:  [97.5 °F (36.4 °C)-98 °F (36.7 °C)] 97.7 °F (36.5 °C)  Pulse:  [] 79  Resp:  [9-43] 21  SpO2:  [85 %-100 %] 100 %  BP: ()/(34-57) 95/54     Weight: 69 kg (152 lb 1.9 oz)  Body mass index is 29.71 kg/m².    Intake/Output Summary (Last 24 hours) at 10/29/2019 1604  Last data filed at 10/29/2019 1500  Gross per 24 hour   Intake 4411.82 ml   Output 1574 ml   Net 2837.82 ml      Physical Exam   Constitutional: She appears well-developed. She appears lethargic. No distress. She is intubated.   Pulmonary/Chest: She is intubated.   Abdominal: Soft. There is no guarding.   Neurological: She appears lethargic.   Nursing note and vitals reviewed.      Significant Labs: All pertinent labs within the past 24 hours have been reviewed.    Significant Imaging: I have reviewed all pertinent imaging results/findings within the past 24 hours.   CT Abdomen Pelvis With Contrast 10/27/19: FINDINGS:  The visualized portion of the heart is unremarkable.  Stable clustering of nodules is seen within the right lung base.  Lung bases show no evidence of focal consolidation or pleural effusion.  No significant hepatic abnormalities are identified.  There is minimal intrahepatic biliary ductal dilatation.  Gallbladder has been removed.  Stomach is distended with air-fluid level seen.  Fluid is seen within the distal esophagus which may relate to gastroesophageal reflux.  Kidneys enhance normally with no evidence of hydronephrosis.  Urinary bladder is unremarkable.  Uterus has been removed.  Postsurgical dural changes of partial bowel resection are seen.   There is long segment of abnormal small bowel wall thickening with increased mucosal enhancement consistent with acute enteritis.  No surrounding inflammatory changes are seen.  No evidence of abnormal small bowel dilatation or obstruction.  Appendix is not visualized and may have been removed.  Prominent stool is seen within the colon with large volume retained stool seen within the distal sigmoid colon and rectum.  No free air identified.  Small volume peritoneal fluid is seen along the bilateral pericolic gutters.  Aorta tapers normally with prominent atherosclerosis.  Left renal artery stent is seen.  No acute osseous abnormality identified.  Multilevel degenerative changes are seen within the lumbar spine with lower lumbar laminectomies seen.  Remote compression fractures are seen of the T11 and L2 vertebral bodies.  Subcutaneous soft tissue structures are unremarkable.  Impression:  1. Long segment abnormal small bowel wall thickening with increased mucosal enhancement consistent with acute enteritis.  No evidence of abnormal small bowel dilatation or obstruction.  2. Prominent retained stool seen throughout the colon with large volume stool seen within the sigmoid colon and rectum.  Correlate for possible constipation and fecal impaction.  3. Small volume peritoneal free fluid seen along the bilateral pericolic gutters.  4. Postsurgical changes and multiple additional findings as detailed above.  X-Ray Chest 1 View 10/28/19: FINDINGS:  Single portable chest view is submitted.  Right-sided subcutaneous central venous catheter again noted, in the interim a right-sided internal jugular central venous catheter are appears to have been placed, the distal tip of both of these appears at the expected location of the SVC.  There is diminished depth of inspiration and mild rotation when accounting for this the cardiomediastinal silhouette appears stable.  Accentuated pulmonary bronchovascular markings consistent with  diminished depth of inspiration noted.  There is no evidence for confluent infiltrate or consolidation, significant pleural effusion or pneumothorax.  The osseous structures demonstrate chronic change including prominent chronic appearance of the right humeral head, and vertebral body height loss along the distal thoracic spine.  Impression:  Right-sided central venous catheters are noted distal tip superior at the level of the expected location of the SVC.  Diminished depth of inspiration and chronic change noted without additional evidence for superimposed acute intrathoracic process.  X-Ray Abdomen AP 1 View 10/28/19: FINDINGS:  Diffuse moderate/severe dilatation of the transverse, descending and sigmoid colon as well as rectum which are air and stool filled.  No appreciable small bowel dilatation or air-fluid levels.  No secondary signs of pneumoperitoneum or evidence of pneumatosis.  Multilevel degenerative changes of the imaged spine.  Impression: Diffuse moderate/severe dilatation of the distal half of the colon and rectum which are air and stool-filled without evidence of small bowel a dilatation which could correlate with the clinical diagnosis of rectal fecal impaction and constipation.  No evidence for pneumatosis or secondary signs of pneumoperitoneum.  X-Ray Chest 1 View 10/29/19: FINDINGS:  Single chest view is submitted.  There is diminished depth of inspiration and mild rotation exaggerating the appearance of the cardiomediastinal silhouette.  There is also accentuation of pulmonary bronchovascular markings associated with diminished depth of inspiration.  Mild atelectatic change noted at the lung bases.  Enteric tube noted the distal tip is subdiaphragmatic overlying the upper abdomen.  Right-sided central venous catheters are again noted, stable.  The osseous structures demonstrate chronic change.  Impression:  Enteric tube noted, distal tip is subdiaphragmatic.  Diminished depth of inspiration,  radiographic appearance of crowding with mild atelectatic change noted at the lung bases.

## 2019-10-29 NOTE — HOSPITAL COURSE
Blood cultures grew pan-sensitive Escherichia coli. Due to history of bowel resection for obstruction, General Surgery was consulted for her bowel obstruction. She was put on amikacin and cefepime. 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 She was kept intubated postoperatively. Hospital Medicine changed amikacin to metronidazole, then later stopped metronidazole and changed cefepime to meropenem. She was given total parenteral nutrition. She became oliguric but responded well to a dose of furosemide, so Nephrology gave her doses of bumetanide. She developed thrombocytopenia. She developed atrial fibrillation and sinus pauses, so Cardiology was consulted and placed a temporary transvenous pacemaker. Echocardiogram only showed concentric remodeling and mild mitral regurgitation. Norepinephrine was changed to dopamine. EEG showed triphasic waves bilaterally consistent with metabolic encephalopathy. The epileptologist recommended treating her hyponatremia. She required pRBC transfusion. Nephrology improved her hyponatremia, and encephalopathy resolved. She was weaned off vasopressors and was extubated the morning of 11/2/19. Surgery stopped TPN and started full liquid diet on 11/3/19. Ertapenem was changed to cefazolin but Infectious Disease restarted metronidazole to continue empiric anaerobe coverage. Cardiology removed her temporary pacemaker on 11/4/19. A Martinez catheter was placed on 11/5/19 for urinary retention and she will follow up with Urology. She was found to have right vocal cord paralysis and will continue Speech Therapy. Infectious Disease recommended finishing cephalexin, metronidazole, and fluconazole on 11/15/19. She was accepted to [facility] skilled nursing facility on 11/12/19.

## 2019-10-29 NOTE — PLAN OF CARE
Patient afebrile throughout shift. After exploratory lap at 9:30 AM, patient returned at 12:45 with colostomy and intubated. Patient is not sedated. Plan from Dr. Johnson is to remove ETT when patient wakes up. See MAR for levo titration. Urine output low through Dr. Akanksha gonzales aware.

## 2019-10-29 NOTE — CARE UPDATE
Pt from surgery still intubated with a 7.0@22lip. On vent with settings 14/400/40%/+5. Will continue to monitor.

## 2019-10-29 NOTE — TRANSFER OF CARE
Anesthesia Transfer of Care Note    Patient: Annette Garcia    Procedure(s) Performed: Procedure(s) (LRB):  LAPAROTOMY, EXPLORATORY (N/A)  COLECTOMY, SIGMOID WITH END COLOSTOMY (N/A)    Patient location: ICU    Anesthesia Type: general    Transport from OR: Transported from OR intubated on 100% O2 by AMBU with assisted ventilation. Continuous ECG monitoring in transport. Continuous SpO2 monitoring in transport    Post pain: adequate analgesia    Post assessment: no apparent anesthetic complications    Post vital signs: stable    Level of consciousness: sedated    Nausea/Vomiting: no nausea/vomiting    Complications: none    Transfer of care protocol was followed      Last vitals:   Visit Vitals  BP (!) 93/50   Pulse 90   Temp 36.9 °C (98.4 °F) (Axillary)   Resp (!) 36   Ht 5' (1.524 m)   Wt 69 kg (152 lb 1.9 oz)   SpO2 93%   Breastfeeding? No   BMI 29.71 kg/m²

## 2019-10-29 NOTE — PROGRESS NOTES
[facility] Hospital Medicine  Progress Note    Patient Name: Annette Garcia  MRN: [id number]  Patient Class: IP- Inpatient   Admission Date: 10/27/2019  Length of Stay: 1 days  Attending Physician: José Manuel Guidry MD  Primary Care Provider: Jose Valles MD        Subjective:     Principal Problem:Colon necrosis        HPI:  Annette Garcia is an 84 year old white woman with peripheral vascular disease, renovascular hypertension, renal artery stenosis status post left renal artery stent placement on 7/19/17, coronary artery disease with history of myocardial infarction, diffuse large B cell lymphoma, anemia, epilepsy, hypothyroidism, chronic hyponatremia, depression, lumbar and sacroiliac joint osteoarthritis with chronic low back pain and history of lumbar spine surgery in 2013, slow transit constipation, history of appendectomy, history of cholecystectomy history hysterectomy, history of small bowel obstruction status post small bowel resection on 8/23/16. She lives in [place], [place]. Her son has epilepsy. Her daughter has tuberous sclerosis. Her primary care physician is Dr. Jose Torres. Her pain management specialist is Dr. Chirag Bates. Her neurologist is Dr. Gamal Lock.    She presented to [facility] on 10/27/19 with abdominal pain, difficulty urinating, and constipation for one day. She took polyethylene glycol and senna-docusate without relief. She strained during her last attempt at having a bowel movement. She had one episode of vomiting on the day of presentation. She felt weak when walking. In the emergency department, she was found to have acute kidney injury (BUN 27, creatinine 1.6, from 9 and 0.7 on 8/29/19), elevated transaminases ( and , from 16 and 11 on 8/29/19), and lactic acidosis (5.8). Contrast CT showed acute enteritis, sigmoid and rectal constipation, and some peritoneal free fluid in the pericolic  magdy.she was given lactated ringers, cefepime, and metronidazole. She required norepinephrine for septic shock. She was admitted to [facility].    Overview/Hospital Course:  Blood cultures grew Gram negative rods. Due to history of bowel resection for obstruction, General Surgery was consulted for her bowel obstruction. She was put on amikacin and cefepime. 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 She was kept intubated postoperatively.    Interval History: Patient's sister, brother, and sister-in-law in the room, updated on plan.    Review of Systems   Unable to perform ROS: Intubated     Objective:     Vital Signs (Most Recent):  Temp: 97.7 °F (36.5 °C) (10/29/19 0800)  Pulse: 79 (10/29/19 1430)  Resp: (!) 21 (10/29/19 1430)  BP: (!) 95/54 (10/29/19 1430)  SpO2: 100 % (10/29/19 1430) Vital Signs (24h Range):  Temp:  [97.5 °F (36.4 °C)-98 °F (36.7 °C)] 97.7 °F (36.5 °C)  Pulse:  [] 79  Resp:  [9-43] 21  SpO2:  [85 %-100 %] 100 %  BP: ()/(34-57) 95/54     Weight: 69 kg (152 lb 1.9 oz)  Body mass index is 29.71 kg/m².    Intake/Output Summary (Last 24 hours) at 10/29/2019 1604  Last data filed at 10/29/2019 1500  Gross per 24 hour   Intake 4411.82 ml   Output 1574 ml   Net 2837.82 ml      Physical Exam   Constitutional: She appears well-developed. She appears lethargic. No distress. She is intubated.   Pulmonary/Chest: She is intubated.   Abdominal: Soft. There is no guarding.   Neurological: She appears lethargic.   Nursing note and vitals reviewed.      Significant Labs: All pertinent labs within the past 24 hours have been reviewed.    Significant Imaging: I have reviewed all pertinent imaging results/findings within the past 24 hours.   CT Abdomen Pelvis With Contrast 10/27/19: FINDINGS:  The visualized portion of the heart is unremarkable.  Stable clustering of nodules is seen within the right lung  base.  Lung bases show no evidence of focal consolidation or pleural effusion.  No significant hepatic abnormalities are identified.  There is minimal intrahepatic biliary ductal dilatation.  Gallbladder has been removed.  Stomach is distended with air-fluid level seen.  Fluid is seen within the distal esophagus which may relate to gastroesophageal reflux.  Kidneys enhance normally with no evidence of hydronephrosis.  Urinary bladder is unremarkable.  Uterus has been removed.  Postsurgical dural changes of partial bowel resection are seen.  There is long segment of abnormal small bowel wall thickening with increased mucosal enhancement consistent with acute enteritis.  No surrounding inflammatory changes are seen.  No evidence of abnormal small bowel dilatation or obstruction.  Appendix is not visualized and may have been removed.  Prominent stool is seen within the colon with large volume retained stool seen within the distal sigmoid colon and rectum.  No free air identified.  Small volume peritoneal fluid is seen along the bilateral pericolic gutters.  Aorta tapers normally with prominent atherosclerosis.  Left renal artery stent is seen.  No acute osseous abnormality identified.  Multilevel degenerative changes are seen within the lumbar spine with lower lumbar laminectomies seen.  Remote compression fractures are seen of the T11 and L2 vertebral bodies.  Subcutaneous soft tissue structures are unremarkable.  Impression:  1. Long segment abnormal small bowel wall thickening with increased mucosal enhancement consistent with acute enteritis.  No evidence of abnormal small bowel dilatation or obstruction.  2. Prominent retained stool seen throughout the colon with large volume stool seen within the sigmoid colon and rectum.  Correlate for possible constipation and fecal impaction.  3. Small volume peritoneal free fluid seen along the bilateral pericolic gutters.  4. Postsurgical changes and multiple additional  findings as detailed above.  X-Ray Chest 1 View 10/28/19: FINDINGS:  Single portable chest view is submitted.  Right-sided subcutaneous central venous catheter again noted, in the interim a right-sided internal jugular central venous catheter are appears to have been placed, the distal tip of both of these appears at the expected location of the SVC.  There is diminished depth of inspiration and mild rotation when accounting for this the cardiomediastinal silhouette appears stable.  Accentuated pulmonary bronchovascular markings consistent with diminished depth of inspiration noted.  There is no evidence for confluent infiltrate or consolidation, significant pleural effusion or pneumothorax.  The osseous structures demonstrate chronic change including prominent chronic appearance of the right humeral head, and vertebral body height loss along the distal thoracic spine.  Impression:  Right-sided central venous catheters are noted distal tip superior at the level of the expected location of the SVC.  Diminished depth of inspiration and chronic change noted without additional evidence for superimposed acute intrathoracic process.  X-Ray Abdomen AP 1 View 10/28/19: FINDINGS:  Diffuse moderate/severe dilatation of the transverse, descending and sigmoid colon as well as rectum which are air and stool filled.  No appreciable small bowel dilatation or air-fluid levels.  No secondary signs of pneumoperitoneum or evidence of pneumatosis.  Multilevel degenerative changes of the imaged spine.  Impression: Diffuse moderate/severe dilatation of the distal half of the colon and rectum which are air and stool-filled without evidence of small bowel a dilatation which could correlate with the clinical diagnosis of rectal fecal impaction and constipation.  No evidence for pneumatosis or secondary signs of pneumoperitoneum.  X-Ray Chest 1 View 10/29/19: FINDINGS:  Single chest view is submitted.  There is diminished depth of  inspiration and mild rotation exaggerating the appearance of the cardiomediastinal silhouette.  There is also accentuation of pulmonary bronchovascular markings associated with diminished depth of inspiration.  Mild atelectatic change noted at the lung bases.  Enteric tube noted the distal tip is subdiaphragmatic overlying the upper abdomen.  Right-sided central venous catheters are again noted, stable.  The osseous structures demonstrate chronic change.  Impression:  Enteric tube noted, distal tip is subdiaphragmatic.  Diminished depth of inspiration, radiographic appearance of crowding with mild atelectatic change noted at the lung bases.          Assessment/Plan:      * Colon necrosis  Fecal obstruction  Gram-negative bacteremia  On antibiotics. Change amikacin to metronidazole. Status post sigmoidectomy. Appreciate General Surgery. Still intubated. Consult Pulmonology to wean off ventilator.    Slow transit constipation  Takes polyethylene glycol at home.    Peripheral vascular disease, unspecified  Hyperlipidemia   Takes pravastatin.    Old MI (myocardial infarction)  Takes pravastatin.    Abnormal liver enzymes  See primary problem.    Lactic acidosis  See primary problem.    Depression  Continue home mirtazapine 7.5mg HS.    Anemia due to antineoplastic chemotherapy  Stable.    DLBCL (diffuse large B cell lymphoma)  Follow up outpatient.    Acquired hypothyroidism  Continue home levothyroxine 125 mcg before breakfast.    Epilepsy  Continue home oxycarbazepine 300 mg nightly, phenobarbital 64.8 mg nightly.    Renovascular hypertension  Hold home lisinopril, clonidine, carvedilol.    VTE Risk Mitigation (From admission, onward)         Ordered     IP VTE HIGH RISK PATIENT  Once      10/28/19 0607     Place sequential compression device  Until discontinued      10/28/19 0607                Critical care time spent on the evaluation and treatment of severe organ dysfunction, review of pertinent labs and imaging  studies, discussions with consulting providers and discussions with patient/family: 35 minutes.      José Manuel Guidry MD  Department of Hospital Medicine   [facility]

## 2019-10-30 PROBLEM — A41.50 GRAM NEGATIVE SEPTIC SHOCK: Status: ACTIVE | Noted: 2019-10-28

## 2019-10-30 PROBLEM — R65.21 E. COLI SEPTIC SHOCK: Status: ACTIVE | Noted: 2019-10-28

## 2019-10-30 PROBLEM — I45.5 SINUS PAUSE: Status: ACTIVE | Noted: 2019-10-30

## 2019-10-30 PROBLEM — B96.20 E COLI BACTEREMIA: Status: ACTIVE | Noted: 2019-10-29

## 2019-10-30 PROBLEM — A41.51 E. COLI SEPTIC SHOCK: Status: ACTIVE | Noted: 2019-10-28

## 2019-10-30 PROBLEM — I48.0 PAROXYSMAL ATRIAL FIBRILLATION: Status: ACTIVE | Noted: 2019-10-30

## 2019-10-30 LAB
ALBUMIN SERPL BCP-MCNC: 2.2 G/DL (ref 3.5–5.2)
ALP SERPL-CCNC: 40 U/L (ref 55–135)
ALT SERPL W/O P-5'-P-CCNC: 46 U/L (ref 10–44)
ANION GAP SERPL CALC-SCNC: 10 MMOL/L (ref 8–16)
ANION GAP SERPL CALC-SCNC: 10 MMOL/L (ref 8–16)
ANION GAP SERPL CALC-SCNC: 11 MMOL/L (ref 8–16)
ANION GAP SERPL CALC-SCNC: 13 MMOL/L (ref 8–16)
AORTIC ROOT ANNULUS: 3.38 CM
AORTIC VALVE CUSP SEPERATION: 1.48 CM
ASCENDING AORTA: 2.61 CM
AST SERPL-CCNC: 61 U/L (ref 10–40)
AV INDEX (PROSTH): 0.83
AV MEAN GRADIENT: 3 MMHG
AV PEAK GRADIENT: 6 MMHG
AV VALVE AREA: 2.5 CM2
AV VELOCITY RATIO: 0.65
BACTERIA BLD CULT: ABNORMAL
BASOPHILS # BLD AUTO: ABNORMAL K/UL (ref 0–0.2)
BASOPHILS NFR BLD: 0 % (ref 0–1.9)
BILIRUB SERPL-MCNC: 1 MG/DL (ref 0.1–1)
BSA FOR ECHO PROCEDURE: 1.73 M2
BUN SERPL-MCNC: 51 MG/DL (ref 8–23)
BUN SERPL-MCNC: 53 MG/DL (ref 8–23)
BUN SERPL-MCNC: 55 MG/DL (ref 8–23)
BUN SERPL-MCNC: 56 MG/DL (ref 8–23)
CALCIUM SERPL-MCNC: 7.1 MG/DL (ref 8.7–10.5)
CALCIUM SERPL-MCNC: 7.6 MG/DL (ref 8.7–10.5)
CALCIUM SERPL-MCNC: 7.6 MG/DL (ref 8.7–10.5)
CALCIUM SERPL-MCNC: 7.7 MG/DL (ref 8.7–10.5)
CHLORIDE SERPL-SCNC: 92 MMOL/L (ref 95–110)
CHLORIDE SERPL-SCNC: 93 MMOL/L (ref 95–110)
CO2 SERPL-SCNC: 20 MMOL/L (ref 23–29)
CO2 SERPL-SCNC: 20 MMOL/L (ref 23–29)
CO2 SERPL-SCNC: 22 MMOL/L (ref 23–29)
CO2 SERPL-SCNC: 22 MMOL/L (ref 23–29)
CREAT SERPL-MCNC: 2.1 MG/DL (ref 0.5–1.4)
CREAT SERPL-MCNC: 2.3 MG/DL (ref 0.5–1.4)
CREAT SERPL-MCNC: 2.4 MG/DL (ref 0.5–1.4)
CREAT SERPL-MCNC: 2.4 MG/DL (ref 0.5–1.4)
CV ECHO LV RWT: 0.47 CM
DIFFERENTIAL METHOD: ABNORMAL
DOP CALC AO PEAK VEL: 1.23 M/S
DOP CALC AO VTI: 17.79 CM
DOP CALC LVOT AREA: 3 CM2
DOP CALC LVOT DIAMETER: 1.96 CM
DOP CALC LVOT PEAK VEL: 0.8 M/S
DOP CALC LVOT STROKE VOLUME: 44.45 CM3
DOP CALCLVOT PEAK VEL VTI: 14.74 CM
ECHO LV POSTERIOR WALL: 0.98 CM (ref 0.6–1.1)
EOSINOPHIL # BLD AUTO: ABNORMAL K/UL (ref 0–0.5)
EOSINOPHIL NFR BLD: 1 % (ref 0–8)
ERYTHROCYTE [DISTWIDTH] IN BLOOD BY AUTOMATED COUNT: 14.2 % (ref 11.5–14.5)
EST. GFR  (AFRICAN AMERICAN): 21 ML/MIN/1.73 M^2
EST. GFR  (AFRICAN AMERICAN): 21 ML/MIN/1.73 M^2
EST. GFR  (AFRICAN AMERICAN): 22 ML/MIN/1.73 M^2
EST. GFR  (AFRICAN AMERICAN): 25 ML/MIN/1.73 M^2
EST. GFR  (NON AFRICAN AMERICAN): 18 ML/MIN/1.73 M^2
EST. GFR  (NON AFRICAN AMERICAN): 18 ML/MIN/1.73 M^2
EST. GFR  (NON AFRICAN AMERICAN): 19 ML/MIN/1.73 M^2
EST. GFR  (NON AFRICAN AMERICAN): 21 ML/MIN/1.73 M^2
FRACTIONAL SHORTENING: 30 % (ref 28–44)
GLUCOSE SERPL-MCNC: 200 MG/DL (ref 70–110)
GLUCOSE SERPL-MCNC: 205 MG/DL (ref 70–110)
GLUCOSE SERPL-MCNC: 85 MG/DL (ref 70–110)
GLUCOSE SERPL-MCNC: 86 MG/DL (ref 70–110)
HCT VFR BLD AUTO: 27 % (ref 37–48.5)
HGB BLD-MCNC: 9.2 G/DL (ref 12–16)
INTERVENTRICULAR SEPTUM: 1.07 CM (ref 0.6–1.1)
LA MAJOR: 5.12 CM
LA MINOR: 4.83 CM
LA WIDTH: 2.65 CM
LACTATE SERPL-SCNC: 2.6 MMOL/L (ref 0.5–2.2)
LEFT ATRIUM SIZE: 3.21 CM
LEFT ATRIUM VOLUME INDEX: 21.4 ML/M2
LEFT ATRIUM VOLUME: 35.94 CM3
LEFT INTERNAL DIMENSION IN SYSTOLE: 2.93 CM (ref 2.1–4)
LEFT VENTRICLE DIASTOLIC VOLUME INDEX: 45.94 ML/M2
LEFT VENTRICLE DIASTOLIC VOLUME: 77.15 ML
LEFT VENTRICLE MASS INDEX: 84 G/M2
LEFT VENTRICLE SYSTOLIC VOLUME INDEX: 19.7 ML/M2
LEFT VENTRICLE SYSTOLIC VOLUME: 33.07 ML
LEFT VENTRICULAR INTERNAL DIMENSION IN DIASTOLE: 4.17 CM (ref 3.5–6)
LEFT VENTRICULAR MASS: 140.48 G
LYMPHOCYTES # BLD AUTO: ABNORMAL K/UL (ref 1–4.8)
LYMPHOCYTES NFR BLD: 7 % (ref 18–48)
MAGNESIUM SERPL-MCNC: 3.7 MG/DL (ref 1.6–2.6)
MAGNESIUM SERPL-MCNC: 4.1 MG/DL (ref 1.6–2.6)
MCH RBC QN AUTO: 33.2 PG (ref 27–31)
MCHC RBC AUTO-ENTMCNC: 34.1 G/DL (ref 32–36)
MCV RBC AUTO: 98 FL (ref 82–98)
METAMYELOCYTES NFR BLD MANUAL: 5 %
MONOCYTES # BLD AUTO: ABNORMAL K/UL (ref 0.3–1)
MONOCYTES NFR BLD: 1 % (ref 4–15)
MYELOCYTES NFR BLD MANUAL: 2 %
NEUTROPHILS NFR BLD: 69 % (ref 38–73)
NEUTS BAND NFR BLD MANUAL: 15 %
OSMOLALITY SERPL: 276 MOSM/KG (ref 275–295)
OSMOLALITY UR: 312 MOSM/KG (ref 50–1200)
PHOSPHATE SERPL-MCNC: 4 MG/DL (ref 2.7–4.5)
PHOSPHATE SERPL-MCNC: 5.2 MG/DL (ref 2.7–4.5)
PISA TR MAX VEL: 2.06 M/S
PLATELET # BLD AUTO: 69 K/UL (ref 150–350)
PLATELET BLD QL SMEAR: ABNORMAL
PMV BLD AUTO: 11.2 FL (ref 9.2–12.9)
POTASSIUM SERPL-SCNC: 3.7 MMOL/L (ref 3.5–5.1)
POTASSIUM SERPL-SCNC: 4.3 MMOL/L (ref 3.5–5.1)
POTASSIUM SERPL-SCNC: 4.6 MMOL/L (ref 3.5–5.1)
POTASSIUM SERPL-SCNC: 4.7 MMOL/L (ref 3.5–5.1)
PROT SERPL-MCNC: 3.9 G/DL (ref 6–8.4)
PULM VEIN S/D RATIO: 1.21
PV PEAK D VEL: 0.24 M/S
PV PEAK S VEL: 0.29 M/S
RA MAJOR: 3.96 CM
RA WIDTH: 2.15 CM
RBC # BLD AUTO: 2.77 M/UL (ref 4–5.4)
RIGHT VENTRICULAR END-DIASTOLIC DIMENSION: 1.57 CM
SODIUM SERPL-SCNC: 124 MMOL/L (ref 136–145)
SODIUM SERPL-SCNC: 125 MMOL/L (ref 136–145)
SODIUM UR-SCNC: <20 MMOL/L (ref 20–250)
TDI LATERAL: 0.16 M/S
TDI SEPTAL: 0.09 M/S
TDI: 0.13 M/S
TR MAX PG: 17 MMHG
WBC # BLD AUTO: 5.56 K/UL (ref 3.9–12.7)

## 2019-10-30 PROCEDURE — 25000003 PHARM REV CODE 250: Performed by: EMERGENCY MEDICINE

## 2019-10-30 PROCEDURE — 63600175 PHARM REV CODE 636 W HCPCS: Mod: JG | Performed by: INTERNAL MEDICINE

## 2019-10-30 PROCEDURE — S0028 INJECTION, FAMOTIDINE, 20 MG: HCPCS | Performed by: HOSPITALIST

## 2019-10-30 PROCEDURE — 25000003 PHARM REV CODE 250: Performed by: HOSPITALIST

## 2019-10-30 PROCEDURE — 83930 ASSAY OF BLOOD OSMOLALITY: CPT

## 2019-10-30 PROCEDURE — 33210 PR INSER HEART TEMP PACER ONE CHMBR: ICD-10-PCS | Mod: ,,, | Performed by: INTERNAL MEDICINE

## 2019-10-30 PROCEDURE — 27000221 HC OXYGEN, UP TO 24 HOURS

## 2019-10-30 PROCEDURE — 63600175 PHARM REV CODE 636 W HCPCS: Performed by: NURSE PRACTITIONER

## 2019-10-30 PROCEDURE — 99233 SBSQ HOSP IP/OBS HIGH 50: CPT | Mod: 25,,, | Performed by: NURSE PRACTITIONER

## 2019-10-30 PROCEDURE — C1894 INTRO/SHEATH, NON-LASER: HCPCS | Performed by: INTERNAL MEDICINE

## 2019-10-30 PROCEDURE — 99152 MOD SED SAME PHYS/QHP 5/>YRS: CPT | Performed by: INTERNAL MEDICINE

## 2019-10-30 PROCEDURE — 33210 INSERT ELECTRD/PM CATH SNGL: CPT | Performed by: INTERNAL MEDICINE

## 2019-10-30 PROCEDURE — 99152 PR MOD CONSCIOUS SEDATION, SAME PHYS, 5+ YRS, FIRST 15 MIN: ICD-10-PCS | Mod: ,,, | Performed by: INTERNAL MEDICINE

## 2019-10-30 PROCEDURE — 83735 ASSAY OF MAGNESIUM: CPT | Mod: 91

## 2019-10-30 PROCEDURE — 99153 MOD SED SAME PHYS/QHP EA: CPT | Performed by: INTERNAL MEDICINE

## 2019-10-30 PROCEDURE — 83605 ASSAY OF LACTIC ACID: CPT

## 2019-10-30 PROCEDURE — 25000003 PHARM REV CODE 250: Performed by: INTERNAL MEDICINE

## 2019-10-30 PROCEDURE — 63600175 PHARM REV CODE 636 W HCPCS: Performed by: INTERNAL MEDICINE

## 2019-10-30 PROCEDURE — 99900035 HC TECH TIME PER 15 MIN (STAT)

## 2019-10-30 PROCEDURE — 94761 N-INVAS EAR/PLS OXIMETRY MLT: CPT

## 2019-10-30 PROCEDURE — 33210 INSERT ELECTRD/PM CATH SNGL: CPT | Mod: ,,, | Performed by: INTERNAL MEDICINE

## 2019-10-30 PROCEDURE — 25000003 PHARM REV CODE 250: Performed by: NURSE PRACTITIONER

## 2019-10-30 PROCEDURE — 80053 COMPREHEN METABOLIC PANEL: CPT

## 2019-10-30 PROCEDURE — 80048 BASIC METABOLIC PNL TOTAL CA: CPT | Mod: 91

## 2019-10-30 PROCEDURE — 84100 ASSAY OF PHOSPHORUS: CPT

## 2019-10-30 PROCEDURE — 80048 BASIC METABOLIC PNL TOTAL CA: CPT

## 2019-10-30 PROCEDURE — 20000000 HC ICU ROOM

## 2019-10-30 PROCEDURE — 83935 ASSAY OF URINE OSMOLALITY: CPT

## 2019-10-30 PROCEDURE — 63600175 PHARM REV CODE 636 W HCPCS: Performed by: HOSPITALIST

## 2019-10-30 PROCEDURE — 93005 ELECTROCARDIOGRAM TRACING: CPT

## 2019-10-30 PROCEDURE — 85027 COMPLETE CBC AUTOMATED: CPT

## 2019-10-30 PROCEDURE — 99900026 HC AIRWAY MAINTENANCE (STAT)

## 2019-10-30 PROCEDURE — 99233 PR SUBSEQUENT HOSPITAL CARE,LEVL III: ICD-10-PCS | Mod: 25,,, | Performed by: NURSE PRACTITIONER

## 2019-10-30 PROCEDURE — C1769 GUIDE WIRE: HCPCS | Performed by: INTERNAL MEDICINE

## 2019-10-30 PROCEDURE — P9047 ALBUMIN (HUMAN), 25%, 50ML: HCPCS | Mod: JG | Performed by: INTERNAL MEDICINE

## 2019-10-30 PROCEDURE — 94003 VENT MGMT INPAT SUBQ DAY: CPT

## 2019-10-30 PROCEDURE — 85007 BL SMEAR W/DIFF WBC COUNT: CPT

## 2019-10-30 PROCEDURE — 84100 ASSAY OF PHOSPHORUS: CPT | Mod: 91

## 2019-10-30 PROCEDURE — 99152 MOD SED SAME PHYS/QHP 5/>YRS: CPT | Mod: ,,, | Performed by: INTERNAL MEDICINE

## 2019-10-30 PROCEDURE — 84300 ASSAY OF URINE SODIUM: CPT

## 2019-10-30 PROCEDURE — C1883 ADAPT/EXT, PACING/NEURO LEAD: HCPCS | Performed by: INTERNAL MEDICINE

## 2019-10-30 PROCEDURE — S0030 INJECTION, METRONIDAZOLE: HCPCS | Performed by: HOSPITALIST

## 2019-10-30 RX ORDER — HEPARIN SODIUM 200 [USP'U]/100ML
INJECTION, SOLUTION INTRAVENOUS
Status: DISCONTINUED | OUTPATIENT
Start: 2019-10-30 | End: 2019-10-31

## 2019-10-30 RX ORDER — POTASSIUM CHLORIDE 14.9 MG/ML
20 INJECTION INTRAVENOUS ONCE
Status: COMPLETED | OUTPATIENT
Start: 2019-10-30 | End: 2019-10-30

## 2019-10-30 RX ORDER — FENTANYL CITRATE 50 UG/ML
INJECTION, SOLUTION INTRAMUSCULAR; INTRAVENOUS
Status: DISCONTINUED | OUTPATIENT
Start: 2019-10-30 | End: 2019-10-30 | Stop reason: HOSPADM

## 2019-10-30 RX ORDER — ALBUMIN HUMAN 250 G/1000ML
12.5 SOLUTION INTRAVENOUS ONCE
Status: COMPLETED | OUTPATIENT
Start: 2019-10-30 | End: 2019-10-30

## 2019-10-30 RX ORDER — FUROSEMIDE 10 MG/ML
80 INJECTION INTRAMUSCULAR; INTRAVENOUS ONCE
Status: COMPLETED | OUTPATIENT
Start: 2019-10-30 | End: 2019-10-30

## 2019-10-30 RX ORDER — MORPHINE SULFATE 2 MG/ML
0.5 INJECTION, SOLUTION INTRAMUSCULAR; INTRAVENOUS EVERY 4 HOURS PRN
Status: DISCONTINUED | OUTPATIENT
Start: 2019-10-30 | End: 2019-11-06

## 2019-10-30 RX ORDER — LIDOCAINE HYDROCHLORIDE 20 MG/ML
INJECTION, SOLUTION INFILTRATION; PERINEURAL
Status: DISCONTINUED | OUTPATIENT
Start: 2019-10-30 | End: 2019-10-30 | Stop reason: HOSPADM

## 2019-10-30 RX ORDER — DOPAMINE HYDROCHLORIDE 160 MG/100ML
5 INJECTION, SOLUTION INTRAVENOUS CONTINUOUS
Status: DISCONTINUED | OUTPATIENT
Start: 2019-10-30 | End: 2019-11-02

## 2019-10-30 RX ORDER — FAMOTIDINE 10 MG/ML
20 INJECTION INTRAVENOUS 2 TIMES DAILY
Status: DISCONTINUED | OUTPATIENT
Start: 2019-10-30 | End: 2019-10-30

## 2019-10-30 RX ORDER — FAMOTIDINE 10 MG/ML
20 INJECTION INTRAVENOUS DAILY
Status: DISCONTINUED | OUTPATIENT
Start: 2019-10-30 | End: 2019-11-02

## 2019-10-30 RX ORDER — MIDAZOLAM HYDROCHLORIDE 1 MG/ML
INJECTION INTRAMUSCULAR; INTRAVENOUS
Status: DISCONTINUED | OUTPATIENT
Start: 2019-10-30 | End: 2019-10-30 | Stop reason: HOSPADM

## 2019-10-30 RX ADMIN — METRONIDAZOLE 500 MG: 500 INJECTION, SOLUTION INTRAVENOUS at 08:10

## 2019-10-30 RX ADMIN — PRAVASTATIN SODIUM 20 MG: 20 TABLET ORAL at 08:10

## 2019-10-30 RX ADMIN — OXCARBAZEPINE 300 MG: 150 TABLET, FILM COATED ORAL at 08:10

## 2019-10-30 RX ADMIN — DOPAMINE HYDROCHLORIDE 5 MCG/KG/MIN: 160 INJECTION, SOLUTION INTRAVENOUS at 04:10

## 2019-10-30 RX ADMIN — FUROSEMIDE 80 MG: 10 INJECTION, SOLUTION INTRAVENOUS at 11:10

## 2019-10-30 RX ADMIN — VANCOMYCIN HYDROCHLORIDE 1500 MG: 1.5 INJECTION, POWDER, LYOPHILIZED, FOR SOLUTION INTRAVENOUS at 02:10

## 2019-10-30 RX ADMIN — ALBUMIN (HUMAN) 12.5 G: 25 SOLUTION INTRAVENOUS at 11:10

## 2019-10-30 RX ADMIN — POTASSIUM CHLORIDE 20 MEQ: 14.9 INJECTION, SOLUTION INTRAVENOUS at 10:10

## 2019-10-30 RX ADMIN — CEFEPIME HYDROCHLORIDE 2 G: 2 INJECTION, POWDER, FOR SOLUTION INTRAVENOUS at 01:10

## 2019-10-30 RX ADMIN — ASCORBIC ACID, VITAMIN A PALMITATE, CHOLECALCIFEROL, THIAMINE HYDROCHLORIDE, RIBOFLAVIN-5 PHOSPHATE SODIUM, PYRIDOXINE HYDROCHLORIDE, NIACINAMIDE, DEXPANTHENOL, ALPHA-TOCOPHEROL ACETATE, VITAMIN K1, FOLIC ACID, BIOTIN, CYANOCOBALAMIN: 200; 3300; 200; 6; 3.6; 6; 40; 15; 10; 150; 600; 60; 5 INJECTION, SOLUTION INTRAVENOUS at 02:10

## 2019-10-30 RX ADMIN — Medication 0.45 MCG/KG/MIN: at 05:10

## 2019-10-30 RX ADMIN — LEVOTHYROXINE SODIUM 125 MCG: 25 TABLET ORAL at 05:10

## 2019-10-30 RX ADMIN — FAMOTIDINE 20 MG: 10 INJECTION, SOLUTION INTRAVENOUS at 08:10

## 2019-10-30 RX ADMIN — PHENOBARBITAL 64.8 MG: 32.4 TABLET ORAL at 08:10

## 2019-10-30 RX ADMIN — ERTAPENEM SODIUM 0.5 G: 1 INJECTION, POWDER, LYOPHILIZED, FOR SOLUTION INTRAMUSCULAR; INTRAVENOUS at 04:10

## 2019-10-30 NOTE — PROGRESS NOTES
Pharmacist Renal Dose Adjustment Note    Annette Garcia is a 84 y.o. female being treated with the medication Famotidine    Patient Data:    Vital Signs (Most Recent):  Temp: 98.5 °F (36.9 °C) (10/30/19 0315)  Pulse: 108 (10/30/19 0730)  Resp: (!) 27 (10/30/19 0730)  BP: (!) 89/53 (10/30/19 0730)  SpO2: 97 % (10/30/19 0730)   Vital Signs (72h Range):  Temp:  [96.2 °F (35.7 °C)-98.6 °F (37 °C)]   Pulse:  []   Resp:  [9-43]   BP: ()/(27-66)   SpO2:  [85 %-100 %]      Recent Labs   Lab 10/29/19  0442 10/29/19  1351 10/30/19  0522   CREATININE 2.1* 2.1* 2.1*     Serum creatinine: 2.1 mg/dL (H) 10/30/19 0522  Estimated creatinine clearance: 18.2 mL/min (A)    Medication: Famotidine 20 mg iv bid will be changed to Famotidine 20 mg iv daily.    Pharmacist's Name: Gabriel Elizabeth  Pharmacist's Extension: [phone]

## 2019-10-30 NOTE — SUBJECTIVE & OBJECTIVE
Interval History: Remains intubated. Went into a fib this morning. Minimally responsive off sedation and analgesia. Grimaces with pain. NG has put out 700. Nothing out of colostomy yet. Urine output slowly down trending.     Medications:  Continuous Infusions:   lactated ringers 75 mL/hr at 10/29/19 2257    norepinephrine bitartrate-D5W 0.42 mcg/kg/min (10/30/19 0956)     Scheduled Meds:   ceFEPime (MAXIPIME) IVPB  2 g Intravenous Q24H    famotidine (PF)  20 mg Intravenous Daily    levothyroxine  125 mcg Oral Before breakfast    metronidazole  500 mg Intravenous Q8H    mirtazapine  7.5 mg Oral QHS    OXcarbazepine  300 mg Oral QHS    PHENobarbital  64.8 mg Oral Nightly    pravastatin  20 mg Oral Daily     PRN Meds:acetaminophen, albuterol-ipratropium, ondansetron, promethazine (PHENERGAN) IVPB, ramelteon, sodium chloride 0.9%     Review of patient's allergies indicates:   Allergen Reactions    Adhesive Itching and Blisters    Penicillins Anaphylaxis    Tramadol Hives    Avelox [moxifloxacin] Rash     Facial and arm itching and redness. Pt states throat closes when given.    Amoxil [amoxicillin]     Aspridrox [aspirin, buffered]     Codeine Other (See Comments)     Throat swelling    Keflex [cephalexin]     Norvasc [amlodipine]     Red dye Hives    Robitussin [guaifenesin]     Sulfa (sulfonamide antibiotics)     Tylenol [acetaminophen]      Has reaction to Tylenol with red dye and unable to take Extra Strength Tylenol/ CAN ONLY TOLERATE REG STRENGTH TYLENOL    Vicks vaporub [camphor-eucalyptus oil-menthol]      Objective:     Vital Signs (Most Recent):  Temp: 98.5 °F (36.9 °C) (10/30/19 0315)  Pulse: (!) 114 (10/30/19 0945)  Resp: (!) 27 (10/30/19 0945)  BP: 118/64 (10/30/19 0945)  SpO2: 99 % (10/30/19 0945) Vital Signs (24h Range):  Temp:  [96.2 °F (35.7 °C)-98.5 °F (36.9 °C)] 98.5 °F (36.9 °C)  Pulse:  [] 114  Resp:  [16-30] 27  SpO2:  [93 %-100 %] 99 %  BP: ()/(38-64) 118/64      Weight: 71 kg (156 lb 8.4 oz)  Body mass index is 30.57 kg/m².    Intake/Output - Last 3 Shifts       10/28 0700 - 10/29 0659 10/29 0700 - 10/30 0659 10/30 0700 - 10/31 0659    I.V. (mL/kg) 3307 (47.9) 4057.7 (57.2) 325.8 (4.6)    NG/ 40     IV Piggyback 50 250 100    Total Intake(mL/kg) 3557 (51.6) 4347.7 (61.2) 425.8 (6)    Urine (mL/kg/hr) 442 (0.3) 437 (0.3) 30 (0.1)    Drains 1050 710 300    Stool 0 12     Total Output 1492 1159 330    Net +2065 +3188.7 +95.8           Stool Occurrence 1 x 1 x           Physical Exam   Cardiovascular: An irregularly irregular rhythm present. Tachycardia present.   Pulmonary/Chest:   Mechanical ventilation. Intubated.    Abdominal: She exhibits no distension. There is tenderness (grimaces with pain).   Midline dressing clean, dry, and intact. Colostomy with necrotic appearing mucosa. No stool.    Skin: Skin is warm and dry.   Nursing note and vitals reviewed.      Significant Labs:  CBC:   Recent Labs   Lab 10/30/19  0522   WBC 5.56   RBC 2.77*   HGB 9.2*   HCT 27.0*   PLT 69*   MCV 98   MCH 33.2*   MCHC 34.1     CMP:   Recent Labs   Lab 10/28/19  0046  10/29/19  1351 10/30/19  0522   *   < > 97 86   CALCIUM 10.1   < > 7.8* 7.6*   ALBUMIN 3.6  --  1.9*  --    PROT 6.1  --   --   --    *   < > 126* 124*   K 4.6   < > 5.0 4.7   CO2 21*   < > 22* 20*   CL 95   < > 95 93*   BUN 27*   < > 46* 51*   CREATININE 1.6*   < > 2.1* 2.1*   ALKPHOS 57  --   --   --    *  --   --   --    *  --   --   --    BILITOT 1.1*  --   --   --     < > = values in this interval not displayed.       Significant Diagnostics:  I have reviewed all pertinent imaging results/findings within the past 24 hours.

## 2019-10-30 NOTE — NURSING
Pt started having sinus pause of 10 seconds at 1543, pt came back spontaneously into afib, with no precipitating arrhthymias prior to pause.   Pt had another pause of 11 seconds at 1607, 1609 pause of 5 seconds. Defib/pacer pads placed on pt and pt externally paced per verbal orders by Jeimy Lea NP at bedside. Orders for dopamine gtt.   Pt paced at 110 per MD orders. Dr SHARATH Colbert at bedside to see pt and discussing TVP with pt family

## 2019-10-30 NOTE — PROGRESS NOTES
Progress Note  Nephrology      Consult Requested By: José Manuel Guidry MD      SUBJECTIVE:     Overnight events  Patient is a 84 y.o. female     Patient Active Problem List   Diagnosis    Renovascular hypertension    Epilepsy    Acquired hypothyroidism    Osteoarthritis of lumbar spine    Degenerative joint disease of sacroiliac joint    S/P exploratory laparotomy    DLBCL (diffuse large B cell lymphoma)    Chronic hyponatremia    Anemia due to antineoplastic chemotherapy    Bilateral renal artery stenosis    Closed comminuted fracture of right humerus    Closed displaced fracture of distal phalanx of right little finger    Age-related osteoporosis with current pathological fracture with routine healing    Closed wedge compression fracture of eleventh thoracic vertebra with routine healing    DDD (degenerative disc disease), lumbar    Chronic bilateral low back pain without sciatica    Age-related osteoporosis without current pathological fracture    Vitamin D deficiency    Uncontrolled hypertension    Subjective memory complaints    Depression    Gram negative septic shock    Abnormal liver enzymes    Old MI (myocardial infarction)    Peripheral vascular disease, unspecified    History of stent insertion of left renal artery 7/19/17    Colon necrosis    Slow transit constipation    Fecal obstruction    Gram-negative bacteremia    Abdominal pain     Past Medical History:   Diagnosis Date    Cancer     colon    Hypertension     Petit mal epilepsy 1954    Scoliosis of lumbar spine     Seizures     Unspecified hypothyroidism               OBJECTIVE:     Vitals:    10/30/19 0915 10/30/19 0930 10/30/19 0938 10/30/19 0945   BP: 124/60 (!) 122/56  118/64   Pulse: (!) 115 (!) 114 106 (!) 114   Resp: (!) 28 (!) 27 (!) 26 (!) 27   Temp:       TempSrc:       SpO2: 99% 98% 98% 99%   Weight:       Height:           Temp: 98.5 °F (36.9 °C) (10/30/19 0315)  Pulse: (!) 114 (10/30/19 0945)  Resp:  (!) 27 (10/30/19 0945)  BP: 118/64 (10/30/19 0945)  SpO2: 99 % (10/30/19 0945)    Date 10/30/19 0700 - 10/31/19 0659   Shift 9988-1390 8820-0410 5355-5621 24 Hour Total   INTAKE   I.V.(mL/kg) 325.8(4.6)   325.8(4.6)   IV Piggyback 100   100   Shift Total(mL/kg) 425.8(6)   425.8(6)   OUTPUT   Urine(mL/kg/hr) 45   45   Drains 300   300   Shift Total(mL/kg) 345(4.9)   345(4.9)   Weight (kg) 71 71 71 71             Medications:   ceFEPime (MAXIPIME) IVPB  2 g Intravenous Q24H    famotidine (PF)  20 mg Intravenous Daily    furosemide  80 mg Intravenous Once    levothyroxine  125 mcg Oral Before breakfast    metronidazole  500 mg Intravenous Q8H    mirtazapine  7.5 mg Oral QHS    OXcarbazepine  300 mg Oral QHS    PHENobarbital  64.8 mg Oral Nightly    pravastatin  20 mg Oral Daily      lactated ringers 75 mL/hr at 10/29/19 2257    norepinephrine bitartrate-D5W 0.4 mcg/kg/min (10/30/19 1030)               Physical Exam:  NAD  Lungs Intubated On 30% o2  Diminished breath sounds  Heart: pulse 11   Abdomen: soft  Extremities: edema  Skin: dry  Laboratory:  ABG  Labs reviewed  Recent Results (from the past 336 hour(s))   Basic Metabolic Panel (BMP)    Collection Time: 10/30/19  5:22 AM   Result Value Ref Range    Sodium 124 (L) 136 - 145 mmol/L    Potassium 4.7 3.5 - 5.1 mmol/L    Chloride 93 (L) 95 - 110 mmol/L    CO2 20 (L) 23 - 29 mmol/L    BUN, Bld 51 (H) 8 - 23 mg/dL    Creatinine 2.1 (H) 0.5 - 1.4 mg/dL    Calcium 7.6 (L) 8.7 - 10.5 mg/dL    Anion Gap 11 8 - 16 mmol/L   Basic metabolic panel    Collection Time: 10/29/19  1:51 PM   Result Value Ref Range    Sodium 126 (L) 136 - 145 mmol/L    Potassium 5.0 3.5 - 5.1 mmol/L    Chloride 95 95 - 110 mmol/L    CO2 22 (L) 23 - 29 mmol/L    BUN, Bld 46 (H) 8 - 23 mg/dL    Creatinine 2.1 (H) 0.5 - 1.4 mg/dL    Calcium 7.8 (L) 8.7 - 10.5 mg/dL    Anion Gap 9 8 - 16 mmol/L   Basic Metabolic Panel (BMP)    Collection Time: 10/29/19  4:42 AM   Result Value Ref Range     Sodium 127 (L) 136 - 145 mmol/L    Potassium 5.0 3.5 - 5.1 mmol/L    Chloride 95 95 - 110 mmol/L    CO2 21 (L) 23 - 29 mmol/L    BUN, Bld 44 (H) 8 - 23 mg/dL    Creatinine 2.1 (H) 0.5 - 1.4 mg/dL    Calcium 8.2 (L) 8.7 - 10.5 mg/dL    Anion Gap 11 8 - 16 mmol/L     Recent Results (from the past 336 hour(s))   CBC with Automated Differential    Collection Time: 10/30/19  5:22 AM   Result Value Ref Range    WBC 5.56 3.90 - 12.70 K/uL    Hemoglobin 9.2 (L) 12.0 - 16.0 g/dL    Hematocrit 27.0 (L) 37.0 - 48.5 %    Platelets 69 (L) 150 - 350 K/uL   CBC with Automated Differential    Collection Time: 10/29/19  4:42 AM   Result Value Ref Range    WBC 2.65 (L) 3.90 - 12.70 K/uL    Hemoglobin 11.9 (L) 12.0 - 16.0 g/dL    Hematocrit 36.0 (L) 37.0 - 48.5 %    Platelets 126 (L) 150 - 350 K/uL   CBC auto differential    Collection Time: 10/28/19  5:24 PM   Result Value Ref Range    WBC 5.04 3.90 - 12.70 K/uL    Hemoglobin 12.0 12.0 - 16.0 g/dL    Hematocrit 35.8 (L) 37.0 - 48.5 %    Platelets 139 (L) 150 - 350 K/uL     Urinalysis  No results for input(s): COLORU, CLARITYU, SPECGRAV, PHUR, PROTEINUA, GLUCOSEU, BILIRUBINCON, BLOODU, WBCU, RBCU, BACTERIA, MUCUS, NITRITE, LEUKOCYTESUR, UROBILINOGEN, HYALINECASTS in the last 24 hours.    Diagnostic Results:  X-Ray: Reviewed  US: Reviewed  Echo: Reviewed  ACCESS    ASSESSMENT/PLAN:     S/p Exploratory laparotomy, sigmoid colectomy, end colostomy  JAS   cc today  Lactic acid 3.6  Hyponatremia  Potassium 4 .7  Metabolic acidosis  Metabolic bone disease  Anemia  Hb 9.2  Poor nutrition  Albumin 1.9  Hypotension  Blood pressure 118/64  On norepinephrine  Diuresis  TPN   May need dialysis  Discussed with family

## 2019-10-30 NOTE — PROGRESS NOTES
Pharmacokinetic Initial Assessment: IV Vancomycin    Assessment/Plan:    Initiate intravenous vancomycin with loading dose of 1500 mg once with subsequent doses when random concentrations are less than 20 mcg/mL  Smaller loading dose utilized due to age/renal function.  Desired empiric serum trough concentration is 15 to 20 mcg/mL  Draw vancomycin random level on 10/31 at 1200.  Pharmacy will continue to follow and monitor vancomycin.      Please contact pharmacy at extension [phone] with any questions regarding this assessment.     Thank you for the consult,   Trudy Thomas       Patient brief summary:  Annette Garcia is a 84 y.o. female initiated on antimicrobial therapy with IV Vancomycin for treatment of suspected intra-abdominal infection    Drug Allergies:   Review of patient's allergies indicates:   Allergen Reactions    Adhesive Itching and Blisters    Penicillins Anaphylaxis    Tramadol Hives    Avelox [moxifloxacin] Rash     Facial and arm itching and redness. Pt states throat closes when given.    Amoxil [amoxicillin]     Aspridrox [aspirin, buffered]     Codeine Other (See Comments)     Throat swelling    Keflex [cephalexin]     Norvasc [amlodipine]     Red dye Hives    Robitussin [guaifenesin]     Sulfa (sulfonamide antibiotics)     Tylenol [acetaminophen]      Has reaction to Tylenol with red dye and unable to take Extra Strength Tylenol/ CAN ONLY TOLERATE REG STRENGTH TYLENOL    Vicks vaporub [camphor-eucalyptus oil-menthol]        Actual Body Weight:   71kg    Renal Function:   Estimated Creatinine Clearance: 18.2 mL/min (A) (based on SCr of 2.1 mg/dL (H)).,     Dialysis Method (if applicable):  N/A    CBC (last 72 hours):  Recent Labs   Lab Result Units 10/27/19  2207 10/28/19  1724 10/29/19  0442 10/30/19  0522   WBC K/uL 5.82 5.04 2.65* 5.56   Hemoglobin g/dL 15.1 12.0 11.9* 9.2*   Hematocrit % 44.8 35.8* 36.0* 27.0*   Platelets K/uL 170 139* 126* 69*   Gran% % 80.5* 76.0* 12.0* 69.0   Lymph%  % 14.9* 19.0 20.0 7.0*   Mono% % 4.1 4.8 1.0* 1.0*   Eosinophil% % 0.3 0.2 0.0 1.0   Basophil% % 0.2 0.0 0.0 0.0   Differential Method  Automated Automated Manual Manual       Metabolic Panel (last 72 hours):  Recent Labs   Lab Result Units 10/28/19  0046 10/28/19  0410 10/28/19  1351 10/28/19  1724 10/29/19  0442 10/29/19  1351 10/30/19  0522 10/30/19  0940   Sodium mmol/L 131*  --  131* 131* 127* 126* 124*  --    Sodium Urine Random mmol/L  --   --   --   --   --   --   --  <20*   Potassium mmol/L 4.6  --  4.6 4.8 5.0 5.0 4.7  --    Chloride mmol/L 95  --  97 97 95 95 93*  --    CO2 mmol/L 21*  --  23 23 21* 22* 20*  --    Glucose mg/dL 154*  --  127* 122* 124* 97 86  --    Glucose, UA   --  Negative  --  Negative  --   --   --   --    BUN, Bld mg/dL 27*  --  38* 40* 44* 46* 51*  --    Creatinine mg/dL 1.6*  --  2.3* 2.4* 2.1* 2.1* 2.1*  --    Albumin g/dL 3.6  --   --   --   --  1.9*  --   --    Total Bilirubin mg/dL 1.1*  --   --   --   --   --   --   --    Alkaline Phosphatase U/L 57  --   --   --   --   --   --   --    AST U/L 487*  --   --   --   --   --   --   --    ALT U/L 361*  --   --   --   --   --   --   --    Magnesium mg/dL  --   --   --  4.9*  --  4.6*  --   --    Phosphorus mg/dL  --   --  4.9*  --   --  6.5* 5.2*  --        Drug levels (last 3 results):  No results for input(s): VANCOMYCINRA, VANCOMYCINPE, VANCOMYCINTR in the last 72 hours.    Microbiologic Results:  Microbiology Results (last 7 days)       Procedure Component Value Units Date/Time    Blood Culture #1 **CANNOT BE ORDERED STAT** [[id number]]  (Abnormal)  (Susceptibility) Collected:  10/28/19 0049    Order Status:  Completed Specimen:  Blood from Peripheral, Right  Wrist Updated:  10/30/19 1024     Blood Culture, Routine Gram stain aer bottle: Gram negative rods       Results called to and read back by: Kinga Bateman RN  10/28/2019  19:01      ESCHERICHIA COLI    Blood Culture #2 **CANNOT BE ORDERED STAT** [[id number]] Collected:   10/28/19 0049    Order Status:  Sent Specimen:  Blood from Peripheral, Hand, Left Updated:  10/28/19 0050

## 2019-10-30 NOTE — INTERVAL H&P NOTE
The patient has been examined and the H&P has been reviewed:    I concur with the findings and changes have been noted since the H&P was written: Pt with significant sinus pauses without escape beats. Plan for TVP.     Anesthesia/Surgery risks, benefits and alternative options discussed and understood by patient/family.          Active Hospital Problems    Diagnosis  POA    *Colon necrosis [K55.049]  Yes    E coli bacteremia [R78.81]  Yes    Abdominal pain [R10.9]  Unknown    E. coli septic shock [A41.51, R65.21]  Yes    Abnormal liver enzymes [R74.8]  Yes    Old MI (myocardial infarction) [I25.2]  Not Applicable     Chronic    Peripheral vascular disease, unspecified [I73.9]  Yes     Chronic    Slow transit constipation [K59.01]  Yes     Chronic    Fecal obstruction [K56.41]  Yes    Depression [F32.9]  Yes    History of stent insertion of left renal artery 7/19/17 [Z98.890]  Not Applicable     Chronic    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, T45.1X5A]  Yes     Chronic    Chronic hyponatremia [E87.1]  Yes     Chronic    DLBCL (diffuse large B cell lymphoma) [C83.30]  Yes     Chronic    Acquired hypothyroidism [E03.9]  Yes     Chronic    Renovascular hypertension [I15.0]  Yes     Chronic    Epilepsy [G40.909]  Yes     Chronic     Epilepsy type unknown        Resolved Hospital Problems   No resolved problems to display.

## 2019-10-30 NOTE — PLAN OF CARE
POD # 1 s/p sigmoid colectomy with end colostomy   Pt remains intubated; Tn placed wound care consult and informed Shruti Wound Care Nurse of consult.  Tn to follow for d/c needs       10/30/19 1158   Discharge Reassessment   Assessment Type Discharge Planning Reassessment   Provided patient/caregiver education on the expected discharge date and the discharge plan Yes   Do you have any problems affording any of your prescribed medications? No   Discharge Plan A Skilled Nursing Facility   Discharge Plan B Home Health   DME Needed Upon Discharge  colostomy/ostomy supplies  (tbd)   Can the patient answer the patient profile reliably? No, cognitively impaired

## 2019-10-30 NOTE — SUBJECTIVE & OBJECTIVE
Interval History: On norepinephrine 0.4 mcg/kg/min. In atrial fibrillation.    Review of Systems   Unable to perform ROS: Intubated     Objective:     Vital Signs (Most Recent):  Temp: 98.5 °F (36.9 °C) (10/30/19 0315)  Pulse: (!) 112 (10/30/19 1230)  Resp: (!) 29 (10/30/19 1230)  BP: (!) 110/55 (10/30/19 1230)  SpO2: 97 % (10/30/19 1230) Vital Signs (24h Range):  Temp:  [96.2 °F (35.7 °C)-98.5 °F (36.9 °C)] 98.5 °F (36.9 °C)  Pulse:  [] 112  Resp:  [16-30] 29  SpO2:  [96 %-100 %] 97 %  BP: ()/(38-64) 110/55     Weight: 71 kg (156 lb 8.4 oz)  Body mass index is 30.57 kg/m².    Intake/Output Summary (Last 24 hours) at 10/30/2019 1339  Last data filed at 10/30/2019 1250  Gross per 24 hour   Intake 3873.47 ml   Output 1509 ml   Net 2364.47 ml      Physical Exam   Constitutional: She appears well-developed. She appears lethargic. No distress. She is intubated.   Cardiovascular: An irregularly irregular rhythm present. Tachycardia present.   Pulmonary/Chest: She is intubated.   Abdominal: Soft. There is no guarding.   Surgical dressing intact   Neurological: She appears lethargic.   Moves left hand spontaneously when stimulated.   Nursing note and vitals reviewed.      Significant Labs: All pertinent labs within the past 24 hours have been reviewed.    Significant Imaging: I have reviewed all pertinent imaging results/findings within the past 24 hours.

## 2019-10-30 NOTE — PLAN OF CARE
Pt has been managed with less and less levophed throughout the shift thus far. Pt's urine output was at first low, but has picked up to the baseline reported by day shift - 20 cc/hr, which Dr. Vale was aware of. RN has not witnessed different mentation - pt only grimaces and moves her extremities when her abdomen is palpated. Nail-bed pressure or trapezoid pinches do not cause any response with the pt, nor does vigorous stimulation. Pt appears comfortable at rest. NGT maintained to LIWS and colostomy and midline incision dressing have been monitored and will continue to be monitored.

## 2019-10-30 NOTE — PROGRESS NOTES
[facility]  General Surgery  Progress Note    Subjective:     History of Present Illness:  No notes on file    Post-Op Info:  Procedure(s) (LRB):  LAPAROTOMY, EXPLORATORY (N/A)  COLECTOMY, SIGMOID WITH END COLOSTOMY (N/A)   1 Day Post-Op     Interval History: Remains intubated. Went into a fib this morning. Minimally responsive off sedation and analgesia. Grimaces with pain. NG has put out 700. Nothing out of colostomy yet. Urine output slowly down trending.     Medications:  Continuous Infusions:   lactated ringers 75 mL/hr at 10/29/19 2257    norepinephrine bitartrate-D5W 0.42 mcg/kg/min (10/30/19 0956)     Scheduled Meds:   ceFEPime (MAXIPIME) IVPB  2 g Intravenous Q24H    famotidine (PF)  20 mg Intravenous Daily    levothyroxine  125 mcg Oral Before breakfast    metronidazole  500 mg Intravenous Q8H    mirtazapine  7.5 mg Oral QHS    OXcarbazepine  300 mg Oral QHS    PHENobarbital  64.8 mg Oral Nightly    pravastatin  20 mg Oral Daily     PRN Meds:acetaminophen, albuterol-ipratropium, ondansetron, promethazine (PHENERGAN) IVPB, ramelteon, sodium chloride 0.9%     Review of patient's allergies indicates:   Allergen Reactions    Adhesive Itching and Blisters    Penicillins Anaphylaxis    Tramadol Hives    Avelox [moxifloxacin] Rash     Facial and arm itching and redness. Pt states throat closes when given.    Amoxil [amoxicillin]     Aspridrox [aspirin, buffered]     Codeine Other (See Comments)     Throat swelling    Keflex [cephalexin]     Norvasc [amlodipine]     Red dye Hives    Robitussin [guaifenesin]     Sulfa (sulfonamide antibiotics)     Tylenol [acetaminophen]      Has reaction to Tylenol with red dye and unable to take Extra Strength Tylenol/ CAN ONLY TOLERATE REG STRENGTH TYLENOL    Vicks vaporub [camphor-eucalyptus oil-menthol]      Objective:     Vital Signs (Most Recent):  Temp: 98.5 °F (36.9 °C) (10/30/19 0315)  Pulse: (!) 114 (10/30/19 0945)  Resp: (!) 27  (10/30/19 0945)  BP: 118/64 (10/30/19 0945)  SpO2: 99 % (10/30/19 0945) Vital Signs (24h Range):  Temp:  [96.2 °F (35.7 °C)-98.5 °F (36.9 °C)] 98.5 °F (36.9 °C)  Pulse:  [] 114  Resp:  [16-30] 27  SpO2:  [93 %-100 %] 99 %  BP: ()/(38-64) 118/64     Weight: 71 kg (156 lb 8.4 oz)  Body mass index is 30.57 kg/m².    Intake/Output - Last 3 Shifts       10/28 0700 - 10/29 0659 10/29 0700 - 10/30 0659 10/30 0700 - 10/31 0659    I.V. (mL/kg) 3307 (47.9) 4057.7 (57.2) 325.8 (4.6)    NG/ 40     IV Piggyback 50 250 100    Total Intake(mL/kg) 3557 (51.6) 4347.7 (61.2) 425.8 (6)    Urine (mL/kg/hr) 442 (0.3) 437 (0.3) 30 (0.1)    Drains 1050 710 300    Stool 0 12     Total Output 1492 1159 330    Net +2065 +3188.7 +95.8           Stool Occurrence 1 x 1 x           Physical Exam   Cardiovascular: An irregularly irregular rhythm present. Tachycardia present.   Pulmonary/Chest:   Mechanical ventilation. Intubated.    Abdominal: She exhibits no distension. There is tenderness (grimaces with pain).   Midline dressing clean, dry, and intact. Colostomy with necrotic appearing mucosa. No stool.    Skin: Skin is warm and dry.   Nursing note and vitals reviewed.      Significant Labs:  CBC:   Recent Labs   Lab 10/30/19  0522   WBC 5.56   RBC 2.77*   HGB 9.2*   HCT 27.0*   PLT 69*   MCV 98   MCH 33.2*   MCHC 34.1     CMP:   Recent Labs   Lab 10/28/19  0046  10/29/19  1351 10/30/19  0522   *   < > 97 86   CALCIUM 10.1   < > 7.8* 7.6*   ALBUMIN 3.6  --  1.9*  --    PROT 6.1  --   --   --    *   < > 126* 124*   K 4.6   < > 5.0 4.7   CO2 21*   < > 22* 20*   CL 95   < > 95 93*   BUN 27*   < > 46* 51*   CREATININE 1.6*   < > 2.1* 2.1*   ALKPHOS 57  --   --   --    *  --   --   --    *  --   --   --    BILITOT 1.1*  --   --   --     < > = values in this interval not displayed.       Significant Diagnostics:  I have reviewed all pertinent imaging results/findings within the past 24  hours.    Assessment/Plan:     Abdominal pain  84-year-old female s/p sigmoid colectomy with end colostomy 10/30/19.     -continue abx   -continue NG tube decompression  -monitor colostomy output  -given possible prolonged post op course, may consider TPN starting today  -wean to extubate as tolerated   -may need more fluid resuscitation, recommended checking lactate  -wean pressor support as tolerated  -transition PO meds to IV   -recommend daily chest x-ray while intubated  -rate control for new onset a fib  -remaining care per medical team          Charles Dumont MD  General Surgery  [facility]

## 2019-10-30 NOTE — PROCEDURES
: Timmy Simmons MD  Pre-procedure diagnosis: Sinus pause/SSS  Post-procedure diagnosis: Temporary pacing    Post Procedure Note: TVP    The pt was brought to the cath lab and under sterile technique, LIJ access was obtained without difficulty under US guidance (RIJ with central line). TVP placed in RV and good capture noted. Pt with intrinsic rhythm >100. Please see full report for details. The pt tolerated the procedure well without complications. Sheath sutured in place with rate of 80 at 10 mA.     Vitals:    10/30/19 1630 10/30/19 1645 10/30/19 1700 10/30/19 1705   BP: (!) 104/51 (!) 104/48 106/60 (!) 102/52   Pulse: 91 101 101 99   Resp: (!) 31 (!) 33 (!) 30 (!) 31   Temp:       TempSrc:       SpO2: 95% (!) 93% (!) 93% (!) 94%   Weight:       Height:             Gen: NAD  Ext: 2+ carotid pulse, no evidence of hematoma  Estimated blood loss: < 50 cc    Plan:  -Post procedure care per protocol

## 2019-10-30 NOTE — PT/OT/SLP PROGRESS
Occupational Therapy  Eval Attempt    Patient Name:  Annette Garcia   MRN:  [id number]    Patient intubated, not sedated.  Attempted eval.  Pt did not follow any commands, no eye opening or response to stimuli except increase in HR when spoken to and interacted with  . Will follow-up .    Ronnie Sutton OT  10/30/2019

## 2019-10-30 NOTE — PT/OT/SLP PROGRESS
Physical Therapy      Patient Name:  Annette Garcia   MRN:  [id number]    Patient not seen today secondary to being on nurseing hold for therapy-pt not following commands and moments of asystole. Will follow-up .    Kinga Velásquez, PT   10/30/2019

## 2019-10-30 NOTE — CARE UPDATE
This note also relates to the following rows which could not be included:  BP - Cannot attach notes to unvalidated device data    Pt. Received on vent with settings on the flow sheet. Pt. Sleeping in no apparent distress.  Vent check ok, alarms working and are audible. Vent volumes ok.

## 2019-10-30 NOTE — PROGRESS NOTES
Notified Dr. Hargrove of pulmonary care that pt seems to have gone into atrial fibrillation, with heart rate in the 80's all night and heart rate now in the 110's.

## 2019-10-30 NOTE — PLAN OF CARE
Pt. Received on vent with settings on the flow sheet. Pt. Sleeping in no apparent distress.  Vent check ok, alarms working and are audible. Vent volumes ok.

## 2019-10-30 NOTE — PROGRESS NOTES
[facility] Hospital Medicine  Progress Note    Patient Name: Annette Garcia  MRN: [id number]  Patient Class: IP- Inpatient   Admission Date: 10/27/2019  Length of Stay: 2 days  Attending Physician: José Manuel Guidry MD  Primary Care Provider: Jose Valles MD        Subjective:     Principal Problem:Colon necrosis        HPI:  Annette Garcia is an 84 year old white woman with peripheral vascular disease, renovascular hypertension, renal artery stenosis status post left renal artery stent placement on 7/19/17, coronary artery disease with history of myocardial infarction, diffuse large B cell lymphoma, anemia, epilepsy, hypothyroidism, chronic hyponatremia, depression, lumbar and sacroiliac joint osteoarthritis with chronic low back pain and history of lumbar spine surgery in 2013, slow transit constipation, history of appendectomy, history of cholecystectomy history hysterectomy, history of small bowel obstruction status post small bowel resection on 8/23/16. She lives in [place], [place]. Her son has epilepsy. Her daughter has tuberous sclerosis. Her primary care physician is Dr. Jose Torres. Her pain management specialist is Dr. Chirag Bates. Her neurologist is Dr. Gamal Lock.    She presented to [facility] on 10/27/19 with abdominal pain, difficulty urinating, and constipation for one day. She took polyethylene glycol and senna-docusate without relief. She strained during her last attempt at having a bowel movement. She had one episode of vomiting on the day of presentation. She felt weak when walking. In the emergency department, she was found to have acute kidney injury (BUN 27, creatinine 1.6, from 9 and 0.7 on 8/29/19), elevated transaminases ( and , from 16 and 11 on 8/29/19), and lactic acidosis (5.8). Contrast CT showed acute enteritis, sigmoid and rectal constipation, and some peritoneal free fluid in the pericolic  magdy.she was given lactated ringers, cefepime, and metronidazole. She required norepinephrine for septic shock. She was admitted to [facility].    Overview/Hospital Course:  Blood cultures grew pan-sensitive Escherichia coli. Due to history of bowel resection for obstruction, General Surgery was consulted for her bowel obstruction. She was put on amikacin and cefepime. 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 She was kept intubated postoperatively. Hospital Medicine changed amikacin to metronidazole. She developed atrial fibrillation.    Interval History: On norepinephrine 0.4 mcg/kg/min. In atrial fibrillation.    Review of Systems   Unable to perform ROS: Intubated     Objective:     Vital Signs (Most Recent):  Temp: 98.5 °F (36.9 °C) (10/30/19 0315)  Pulse: (!) 112 (10/30/19 1230)  Resp: (!) 29 (10/30/19 1230)  BP: (!) 110/55 (10/30/19 1230)  SpO2: 97 % (10/30/19 1230) Vital Signs (24h Range):  Temp:  [96.2 °F (35.7 °C)-98.5 °F (36.9 °C)] 98.5 °F (36.9 °C)  Pulse:  [] 112  Resp:  [16-30] 29  SpO2:  [96 %-100 %] 97 %  BP: ()/(38-64) 110/55     Weight: 71 kg (156 lb 8.4 oz)  Body mass index is 30.57 kg/m².    Intake/Output Summary (Last 24 hours) at 10/30/2019 1339  Last data filed at 10/30/2019 1250  Gross per 24 hour   Intake 3873.47 ml   Output 1509 ml   Net 2364.47 ml      Physical Exam   Constitutional: She appears well-developed. She appears lethargic. No distress. She is intubated.   Cardiovascular: An irregularly irregular rhythm present. Tachycardia present.   Pulmonary/Chest: She is intubated.   Abdominal: Soft. There is no guarding.   Surgical dressing intact   Neurological: She appears lethargic.   Moves left hand spontaneously when stimulated.   Nursing note and vitals reviewed.      Significant Labs: All pertinent labs within the past 24 hours have been reviewed.    Significant Imaging: I  have reviewed all pertinent imaging results/findings within the past 24 hours.         Assessment/Plan:      * Colon necrosis  Fecal obstruction  E. coli bacteremia  E. coli septic shock  Atrial fibrillation  Acute tubular necrosis  On cefepime and metronidazole. Due to risk of lowering seizure threshold with cephalosporin, change cefepime to ertapenem (has penicillin and fluoroquinolone anaphylaxis), and can discontinue metronidazole because ertapenem will cover anaerobes. Try furosemide for oliguria. Status post sigmoidectomy. Appreciate General Surgery. Still intubated. Appreciate Pulmonology. May be in status epilepticus so get EEG. Echocardiogram to evaluate cardiac function.    Slow transit constipation  Takes polyethylene glycol at home.    Peripheral vascular disease, unspecified  Hyperlipidemia   Takes pravastatin.    Old MI (myocardial infarction)  Takes pravastatin.    Abnormal liver enzymes  See primary problem.    E. coli septic shock  See primary problem.    Depression  Continue home mirtazapine 7.5mg HS.    Anemia due to antineoplastic chemotherapy  Stable.    DLBCL (diffuse large B cell lymphoma)  Follow up outpatient.    Acquired hypothyroidism  Continue home levothyroxine 125 mcg before breakfast.    Epilepsy  Continue home oxycarbazepine 300 mg nightly, phenobarbital 64.8 mg nightly.    Renovascular hypertension  Hold home lisinopril, clonidine, carvedilol.    VTE Risk Mitigation (From admission, onward)         Ordered     IP VTE LOW RISK PATIENT  Once      10/30/19 0811     Place sequential compression device  Until discontinued      10/28/19 0607                Critical care time spent on the evaluation and treatment of severe organ dysfunction, review of pertinent labs and imaging studies, discussions with consulting providers and discussions with patient/family: 40 minutes.      José Manuel Guidry MD  Department of Hospital Medicine   [facility]

## 2019-10-30 NOTE — ASSESSMENT & PLAN NOTE
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     -continue abx   -continue NG tube decompression  -monitor colostomy output  -given possible prolonged post op course, may consider TPN starting today  -wean to extubate as tolerated   -may need more fluid resuscitation, recommended checking lactate  -wean pressor support as tolerated  -transition PO meds to IV   -recommend daily chest x-ray while intubated  -rate control for new onset a fib  -remaining care per medical team

## 2019-10-30 NOTE — PLAN OF CARE
Patient on  with documented settings.  Alarms are set and functioning with adequate volumes.  AMBU bag and mask at bedside. Will continue to monitor.

## 2019-10-30 NOTE — ANESTHESIA POSTPROCEDURE EVALUATION
Anesthesia Post Evaluation    Patient: Annette Garcia    Procedure(s) Performed: Procedure(s) (LRB):  LAPAROTOMY, EXPLORATORY (N/A)  COLECTOMY, SIGMOID WITH END COLOSTOMY (N/A)    Final Anesthesia Type: general  Patient location during evaluation: ICU  Patient participation: Yes- Able to Participate  Level of consciousness: awake and alert, oriented and awake  Post-procedure vital signs: reviewed and stable  Pain management: adequate  Airway patency: patent  PONV status at discharge: No PONV  Anesthetic complications: no      Cardiovascular status: blood pressure returned to baseline  Respiratory status: unassisted and room air  Hydration status: euvolemic  Follow-up not needed.          Vitals Value Taken Time   BP 93/50 10/30/2019 11:01 AM   Temp 36.9 °C (98.5 °F) 10/30/2019  3:15 AM   Pulse 110 10/30/2019 11:07 AM   Resp 30 10/30/2019 11:07 AM   SpO2 98 % 10/30/2019 11:07 AM   Vitals shown include unvalidated device data.      No case tracking events are documented in the log.      Pain/Emerald Score: No data recorded

## 2019-10-30 NOTE — ASSESSMENT & PLAN NOTE
Fecal obstruction  E. coli bacteremia  E. coli septic shock  Atrial fibrillation  Acute tubular necrosis  Acute metabolic encephalopathy  Sinus pauses  Due to risk of lowering seizure threshold with cephalosporin, changed cefepime to ertapenem. Give another dose of furosemide for oliguria and hyponatremia. Status post sigmoidectomy. Still intubated with poor mentation. Appreciate General Surgery, Pulmonology, Cardiology.

## 2019-10-30 NOTE — CONSULTS
History & Physical  [facility]        SUBJECTIVE:     Chief Complaint:   Acute Respiratory Failure    History of Present Illness:  Annette Garcia is a 84 y.o. female w/ pmhx of epilepsy, SBO, hypothyroidism, DLBCL, CAD, PVD who presented to [facility] w/ abdominal pain, constipation, and vomiting. Suspected SBO and NGT placed. However, became hypotensive with elevated lactate and need for pressors. Ultimately went to the OR for bowel resection. Turbid fluid noted in the abdomen as well. She returned to the ICU intubated. Pulm was consulted for vent management. She awakens to voice but unable to follow commands. History obtained via chart review and family at bedside.     Review of patient's allergies indicates:   Allergen Reactions    Adhesive Itching and Blisters    Penicillins Anaphylaxis    Tramadol Hives    Avelox [moxifloxacin] Rash     Facial and arm itching and redness. Pt states throat closes when given.    Amoxil [amoxicillin]     Aspridrox [aspirin, buffered]     Codeine Other (See Comments)     Throat swelling    Keflex [cephalexin]     Norvasc [amlodipine]     Red dye Hives    Robitussin [guaifenesin]     Sulfa (sulfonamide antibiotics)     Tylenol [acetaminophen]      Has reaction to Tylenol with red dye and unable to take Extra Strength Tylenol/ CAN ONLY TOLERATE REG STRENGTH TYLENOL    Vicks vaporub [camphor-eucalyptus oil-menthol]        Past Medical History:   Diagnosis Date    Cancer     colon    Hypertension     Petit mal epilepsy 1954    Scoliosis of lumbar spine     Seizures     Unspecified hypothyroidism      Past Surgical History:   Procedure Laterality Date    APPENDECTOMY      BACK SURGERY  1988    vertebral fracture    BACK SURGERY  02/2013    lumbar L2-5    CATARACT EXTRACTION, BILATERAL Bilateral     CHOLECYSTECTOMY      open    EYE SURGERY Bilateral     cataract removal with lens implant    HYSTERECTOMY      PORTACATH PLACEMENT Right 09/2016     RENAL ARTERY STENT Left 07/19/2017    SIGMOIDECTOMY  10/29/2019    SMALL INTESTINE SURGERY  08/23/2016    TONSILLECTOMY      VAGINAL HYSTERECTOMY W/ ANTERIOR AND POSTERIOR VAGINAL REPAIR       Family History   Problem Relation Age of Onset    Pancreatic cancer Mother     Hypertension Father     Heart attack Father      Social History     Socioeconomic History    Marital status:      Spouse name: Not on file    Number of children: Not on file    Years of education: Not on file    Highest education level: Not on file   Occupational History    Not on file   Social Needs    Financial resource strain: Not on file    Food insecurity:     Worry: Not on file     Inability: Not on file    Transportation needs:     Medical: Not on file     Non-medical: Not on file   Tobacco Use    Smoking status: Never Smoker    Smokeless tobacco: Never Used   Substance and Sexual Activity    Alcohol use: No    Drug use: No    Sexual activity: Not on file   Lifestyle    Physical activity:     Days per week: Not on file     Minutes per session: Not on file    Stress: Not on file   Relationships    Social connections:     Talks on phone: Not on file     Gets together: Not on file     Attends Yazidi service: Not on file     Active member of club or organization: Not on file     Attends meetings of clubs or organizations: Not on file     Relationship status: Not on file   Other Topics Concern    Not on file   Social History Narrative    Not on file       Review of Systems:  Unable to be obtained      OBJECTIVE:     Vital Signs  Vitals:    10/30/19 0545 10/30/19 0615 10/30/19 0630 10/30/19 0730   BP: (!) 97/51 (!) 98/51 (!) 96/54 (!) 89/53   Pulse: 84 85 103 108   Resp: (!) 25 (!) 29 (!) 27 (!) 27   Temp:       TempSrc:       SpO2: 99% 98% 98% 97%   Weight:       Height:         Body mass index is 30.57 kg/m².    Physical Exam:  General: mild distress  Eyes:  conjunctivae/corneas clear  Nose: no discharge  Neck:  trachea midline with no masses appreciated  Lungs:  Intubated; not tachypneic, normal respiratory effort, no wheezes, no rales;   Heart: regular rate and rhythm and no murmur  Abdomen: non-distended; dusky appearing stoma; ex lap bandage in place  Extremities: no cyanosis, no edema, no clubbing  Skin: No rashes or lesions. good skin turgor  Neurologic: not awake or alert    Laboratory:  Lab Results   Component Value Date    WBC 5.56 10/30/2019    HGB 9.2 (L) 10/30/2019    HCT 27.0 (L) 10/30/2019    MCV 98 10/30/2019     (L) 10/29/2019       ASSESSMENT/PLAN:     Neuro  -not currently on sedation  Hx of Epilepsy  -on home phenobarbital, trileptal  -Multiple reasons to be encephalopathic, ?sedating meds (AEDs), sleep meds (remeron) vs Electrolyte derangements (Na 126) vs Critical illness encephalopathy  -hold any offending meds     CV  Shock  -likely septic shock in the setting of bowel necrosis  -currently requiring pressor support  -may not have achieved adequate source control given spillage of bowel contents  -abx broadened   -will discuss with [facility] team but at the moment  Sinus Pause  -electrolytes wnl  -cards consulted and TVP placed    Resp  Acute Respiratory Failure  -returned from OR on vent; unable to wean  -failed SBT this AM; mostly d/t to mental status  -daily SBT  -continue LPMV    Renal  JAS  -Cr 2.4  -appears volume overloaded and would likely benefit from diuresis  Hyponatremia  -Na continuing to fall with fluid andministration  -possibly SIADH; check Mayra, Uosm, serum Osm    FEN  I/O: +5L  Electrolytes  -Hyponatremia    GI  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 coli  -currently on Cefepime/Flagyl; vanc   -abx broadened as still in shock    Core  -GI ppx w/ pepcid  -DVT ppx; consider ppx      Kobe Hargrove MD  Pulm/CC Fellow PGY 6

## 2019-10-31 LAB
ALBUMIN SERPL BCP-MCNC: 1.9 G/DL (ref 3.5–5.2)
ALBUMIN SERPL BCP-MCNC: 2 G/DL (ref 3.5–5.2)
ALP SERPL-CCNC: 45 U/L (ref 55–135)
ALT SERPL W/O P-5'-P-CCNC: 39 U/L (ref 10–44)
ANION GAP SERPL CALC-SCNC: 7 MMOL/L (ref 8–16)
ANION GAP SERPL CALC-SCNC: 9 MMOL/L (ref 8–16)
ANISOCYTOSIS BLD QL SMEAR: SLIGHT
APTT BLDCRRT: 44.8 SEC (ref 21–32)
AST SERPL-CCNC: 62 U/L (ref 10–40)
BASOPHILS # BLD AUTO: 0 K/UL (ref 0–0.2)
BASOPHILS # BLD AUTO: ABNORMAL K/UL (ref 0–0.2)
BASOPHILS NFR BLD: 0 % (ref 0–1.9)
BASOPHILS NFR BLD: 0 % (ref 0–1.9)
BILIRUB SERPL-MCNC: 1 MG/DL (ref 0.1–1)
BUN SERPL-MCNC: 62 MG/DL (ref 8–23)
BUN SERPL-MCNC: 67 MG/DL (ref 8–23)
BUN SERPL-MCNC: 71 MG/DL (ref 8–23)
BUN SERPL-MCNC: 74 MG/DL (ref 8–23)
CALCIUM SERPL-MCNC: 7.6 MG/DL (ref 8.7–10.5)
CALCIUM SERPL-MCNC: 7.6 MG/DL (ref 8.7–10.5)
CALCIUM SERPL-MCNC: 7.7 MG/DL (ref 8.7–10.5)
CALCIUM SERPL-MCNC: 7.7 MG/DL (ref 8.7–10.5)
CHLORIDE SERPL-SCNC: 92 MMOL/L (ref 95–110)
CO2 SERPL-SCNC: 23 MMOL/L (ref 23–29)
CO2 SERPL-SCNC: 23 MMOL/L (ref 23–29)
CO2 SERPL-SCNC: 24 MMOL/L (ref 23–29)
CO2 SERPL-SCNC: 24 MMOL/L (ref 23–29)
CREAT SERPL-MCNC: 2.5 MG/DL (ref 0.5–1.4)
CREAT SERPL-MCNC: 2.5 MG/DL (ref 0.5–1.4)
CREAT SERPL-MCNC: 2.7 MG/DL (ref 0.5–1.4)
CREAT SERPL-MCNC: 2.8 MG/DL (ref 0.5–1.4)
D DIMER PPP IA.FEU-MCNC: 3.78 MG/L FEU
DIFFERENTIAL METHOD: ABNORMAL
DIFFERENTIAL METHOD: ABNORMAL
EOSINOPHIL # BLD AUTO: 0.1 K/UL (ref 0–0.5)
EOSINOPHIL # BLD AUTO: ABNORMAL K/UL (ref 0–0.5)
EOSINOPHIL NFR BLD: 1 % (ref 0–8)
EOSINOPHIL NFR BLD: 1.4 % (ref 0–8)
ERYTHROCYTE [DISTWIDTH] IN BLOOD BY AUTOMATED COUNT: 14 % (ref 11.5–14.5)
ERYTHROCYTE [DISTWIDTH] IN BLOOD BY AUTOMATED COUNT: 14.1 % (ref 11.5–14.5)
EST. GFR  (AFRICAN AMERICAN): 17 ML/MIN/1.73 M^2
EST. GFR  (AFRICAN AMERICAN): 18 ML/MIN/1.73 M^2
EST. GFR  (AFRICAN AMERICAN): 20 ML/MIN/1.73 M^2
EST. GFR  (AFRICAN AMERICAN): 20 ML/MIN/1.73 M^2
EST. GFR  (NON AFRICAN AMERICAN): 15 ML/MIN/1.73 M^2
EST. GFR  (NON AFRICAN AMERICAN): 16 ML/MIN/1.73 M^2
EST. GFR  (NON AFRICAN AMERICAN): 17 ML/MIN/1.73 M^2
EST. GFR  (NON AFRICAN AMERICAN): 17 ML/MIN/1.73 M^2
FIBRINOGEN PPP-MCNC: 626 MG/DL (ref 182–366)
GLUCOSE SERPL-MCNC: 149 MG/DL (ref 70–110)
GLUCOSE SERPL-MCNC: 171 MG/DL (ref 70–110)
GLUCOSE SERPL-MCNC: 177 MG/DL (ref 70–110)
GLUCOSE SERPL-MCNC: 182 MG/DL (ref 70–110)
HCT VFR BLD AUTO: 21.1 % (ref 37–48.5)
HCT VFR BLD AUTO: 21.8 % (ref 37–48.5)
HGB BLD-MCNC: 7.3 G/DL (ref 12–16)
HGB BLD-MCNC: 7.4 G/DL (ref 12–16)
INR PPP: 1.1 (ref 0.8–1.2)
IRON SERPL-MCNC: 18 UG/DL (ref 30–160)
LYMPHOCYTES # BLD AUTO: 0.3 K/UL (ref 1–4.8)
LYMPHOCYTES # BLD AUTO: ABNORMAL K/UL (ref 1–4.8)
LYMPHOCYTES NFR BLD: 20 % (ref 18–48)
LYMPHOCYTES NFR BLD: 7.6 % (ref 18–48)
MAGNESIUM SERPL-MCNC: 3.7 MG/DL (ref 1.6–2.6)
MCH RBC QN AUTO: 32.7 PG (ref 27–31)
MCH RBC QN AUTO: 33 PG (ref 27–31)
MCHC RBC AUTO-ENTMCNC: 33.9 G/DL (ref 32–36)
MCHC RBC AUTO-ENTMCNC: 34.6 G/DL (ref 32–36)
MCV RBC AUTO: 96 FL (ref 82–98)
MCV RBC AUTO: 97 FL (ref 82–98)
METAMYELOCYTES NFR BLD MANUAL: 2 %
MONOCYTES # BLD AUTO: 0.1 K/UL (ref 0.3–1)
MONOCYTES # BLD AUTO: ABNORMAL K/UL (ref 0.3–1)
MONOCYTES NFR BLD: 1.6 % (ref 4–15)
MONOCYTES NFR BLD: 3 % (ref 4–15)
NEUTROPHILS # BLD AUTO: 3.5 K/UL (ref 1.8–7.7)
NEUTROPHILS # BLD AUTO: ABNORMAL K/UL (ref 1.8–7.7)
NEUTROPHILS NFR BLD: 65 % (ref 38–73)
NEUTROPHILS NFR BLD: 79.5 % (ref 38–73)
NEUTS BAND NFR BLD MANUAL: 9 %
PHENOBARB SERPL-MCNC: 7.9 UG/DL (ref 15–40)
PLATELET # BLD AUTO: 41 K/UL (ref 150–350)
PLATELET # BLD AUTO: 48 K/UL (ref 150–350)
PLATELET BLD QL SMEAR: ABNORMAL
PLATELET BLD QL SMEAR: ABNORMAL
PMV BLD AUTO: 10.5 FL (ref 9.2–12.9)
PMV BLD AUTO: 11 FL (ref 9.2–12.9)
POLYCHROMASIA BLD QL SMEAR: ABNORMAL
POTASSIUM SERPL-SCNC: 4 MMOL/L (ref 3.5–5.1)
POTASSIUM SERPL-SCNC: 4.1 MMOL/L (ref 3.5–5.1)
POTASSIUM SERPL-SCNC: 4.5 MMOL/L (ref 3.5–5.1)
POTASSIUM SERPL-SCNC: 4.9 MMOL/L (ref 3.5–5.1)
PROT SERPL-MCNC: 4.5 G/DL (ref 6–8.4)
PROTHROMBIN TIME: 10.9 SEC (ref 9–12.5)
RBC # BLD AUTO: 2.21 M/UL (ref 4–5.4)
RBC # BLD AUTO: 2.26 M/UL (ref 4–5.4)
SATURATED IRON: 14 % (ref 20–50)
SODIUM SERPL-SCNC: 123 MMOL/L (ref 136–145)
SODIUM SERPL-SCNC: 124 MMOL/L (ref 136–145)
SODIUM SERPL-SCNC: 124 MMOL/L (ref 136–145)
SODIUM SERPL-SCNC: 125 MMOL/L (ref 136–145)
TOTAL IRON BINDING CAPACITY: 126 UG/DL (ref 250–450)
TRANSFERRIN SERPL-MCNC: 85 MG/DL (ref 200–375)
VANCOMYCIN SERPL-MCNC: 13.7 UG/ML
WBC # BLD AUTO: 4.34 K/UL (ref 3.9–12.7)
WBC # BLD AUTO: 4.36 K/UL (ref 3.9–12.7)

## 2019-10-31 PROCEDURE — 95813 EEG EXTND MNTR 61-119 MIN: CPT | Mod: 26,,, | Performed by: PSYCHIATRY & NEUROLOGY

## 2019-10-31 PROCEDURE — 25000003 PHARM REV CODE 250: Performed by: EMERGENCY MEDICINE

## 2019-10-31 PROCEDURE — 99900035 HC TECH TIME PER 15 MIN (STAT)

## 2019-10-31 PROCEDURE — 83540 ASSAY OF IRON: CPT

## 2019-10-31 PROCEDURE — 99900026 HC AIRWAY MAINTENANCE (STAT)

## 2019-10-31 PROCEDURE — 20000000 HC ICU ROOM

## 2019-10-31 PROCEDURE — 94003 VENT MGMT INPAT SUBQ DAY: CPT

## 2019-10-31 PROCEDURE — 80048 BASIC METABOLIC PNL TOTAL CA: CPT | Mod: 91

## 2019-10-31 PROCEDURE — 80202 ASSAY OF VANCOMYCIN: CPT

## 2019-10-31 PROCEDURE — S0028 INJECTION, FAMOTIDINE, 20 MG: HCPCS | Performed by: HOSPITALIST

## 2019-10-31 PROCEDURE — 80048 BASIC METABOLIC PNL TOTAL CA: CPT

## 2019-10-31 PROCEDURE — 27000221 HC OXYGEN, UP TO 24 HOURS

## 2019-10-31 PROCEDURE — 80184 ASSAY OF PHENOBARBITAL: CPT

## 2019-10-31 PROCEDURE — P9047 ALBUMIN (HUMAN), 25%, 50ML: HCPCS | Mod: JG | Performed by: INTERNAL MEDICINE

## 2019-10-31 PROCEDURE — 85007 BL SMEAR W/DIFF WBC COUNT: CPT

## 2019-10-31 PROCEDURE — 25000003 PHARM REV CODE 250: Performed by: HOSPITALIST

## 2019-10-31 PROCEDURE — 82040 ASSAY OF SERUM ALBUMIN: CPT

## 2019-10-31 PROCEDURE — 63600175 PHARM REV CODE 636 W HCPCS: Performed by: NURSE PRACTITIONER

## 2019-10-31 PROCEDURE — 25000003 PHARM REV CODE 250: Performed by: NURSE PRACTITIONER

## 2019-10-31 PROCEDURE — 94761 N-INVAS EAR/PLS OXIMETRY MLT: CPT

## 2019-10-31 PROCEDURE — 25000003 PHARM REV CODE 250: Performed by: INTERNAL MEDICINE

## 2019-10-31 PROCEDURE — 63600175 PHARM REV CODE 636 W HCPCS: Mod: JG | Performed by: INTERNAL MEDICINE

## 2019-10-31 PROCEDURE — 85027 COMPLETE CBC AUTOMATED: CPT

## 2019-10-31 PROCEDURE — 97110 THERAPEUTIC EXERCISES: CPT

## 2019-10-31 PROCEDURE — 85379 FIBRIN DEGRADATION QUANT: CPT

## 2019-10-31 PROCEDURE — 85384 FIBRINOGEN ACTIVITY: CPT

## 2019-10-31 PROCEDURE — 97802 MEDICAL NUTRITION INDIV IN: CPT

## 2019-10-31 PROCEDURE — 99291 PR CRITICAL CARE, E/M 30-74 MINUTES: ICD-10-PCS | Mod: ,,, | Performed by: INTERNAL MEDICINE

## 2019-10-31 PROCEDURE — 63600175 PHARM REV CODE 636 W HCPCS: Performed by: HOSPITALIST

## 2019-10-31 PROCEDURE — S0171 BUMETANIDE 0.5 MG: HCPCS | Performed by: INTERNAL MEDICINE

## 2019-10-31 PROCEDURE — 80183 DRUG SCRN QUANT OXCARBAZEPIN: CPT

## 2019-10-31 PROCEDURE — 36415 COLL VENOUS BLD VENIPUNCTURE: CPT

## 2019-10-31 PROCEDURE — 85610 PROTHROMBIN TIME: CPT

## 2019-10-31 PROCEDURE — 85730 THROMBOPLASTIN TIME PARTIAL: CPT

## 2019-10-31 PROCEDURE — 83735 ASSAY OF MAGNESIUM: CPT

## 2019-10-31 PROCEDURE — 80053 COMPREHEN METABOLIC PANEL: CPT

## 2019-10-31 PROCEDURE — 99291 CRITICAL CARE FIRST HOUR: CPT | Mod: ,,, | Performed by: INTERNAL MEDICINE

## 2019-10-31 PROCEDURE — 95813 PR EEG, EXTENDED, 61-119 MINS: ICD-10-PCS | Mod: 26,,, | Performed by: PSYCHIATRY & NEUROLOGY

## 2019-10-31 PROCEDURE — 97164 PT RE-EVAL EST PLAN CARE: CPT

## 2019-10-31 RX ORDER — VANCOMYCIN HCL IN 5 % DEXTROSE 1G/250ML
1000 PLASTIC BAG, INJECTION (ML) INTRAVENOUS ONCE
Status: COMPLETED | OUTPATIENT
Start: 2019-10-31 | End: 2019-10-31

## 2019-10-31 RX ORDER — BUMETANIDE 0.25 MG/ML
1 INJECTION INTRAMUSCULAR; INTRAVENOUS ONCE
Status: COMPLETED | OUTPATIENT
Start: 2019-10-31 | End: 2019-10-31

## 2019-10-31 RX ORDER — POTASSIUM CHLORIDE 14.9 MG/ML
20 INJECTION INTRAVENOUS
Status: COMPLETED | OUTPATIENT
Start: 2019-10-31 | End: 2019-10-31

## 2019-10-31 RX ORDER — ALBUMIN HUMAN 250 G/1000ML
12.5 SOLUTION INTRAVENOUS ONCE
Status: COMPLETED | OUTPATIENT
Start: 2019-10-31 | End: 2019-10-31

## 2019-10-31 RX ORDER — BUMETANIDE 0.25 MG/ML
1 INJECTION INTRAMUSCULAR; INTRAVENOUS 3 TIMES DAILY PRN
Status: DISCONTINUED | OUTPATIENT
Start: 2019-10-31 | End: 2019-11-06

## 2019-10-31 RX ORDER — FUROSEMIDE 10 MG/ML
80 INJECTION INTRAMUSCULAR; INTRAVENOUS ONCE
Status: COMPLETED | OUTPATIENT
Start: 2019-10-31 | End: 2019-10-31

## 2019-10-31 RX ADMIN — ALBUMIN (HUMAN) 12.5 G: 25 SOLUTION INTRAVENOUS at 12:10

## 2019-10-31 RX ADMIN — PRAVASTATIN SODIUM 20 MG: 20 TABLET ORAL at 10:10

## 2019-10-31 RX ADMIN — OXCARBAZEPINE 300 MG: 150 TABLET, FILM COATED ORAL at 08:10

## 2019-10-31 RX ADMIN — POTASSIUM CHLORIDE 20 MEQ: 14.9 INJECTION, SOLUTION INTRAVENOUS at 01:10

## 2019-10-31 RX ADMIN — BUMETANIDE 1 MG: 0.25 INJECTION INTRAMUSCULAR; INTRAVENOUS at 12:10

## 2019-10-31 RX ADMIN — PHENOBARBITAL 64.8 MG: 32.4 TABLET ORAL at 08:10

## 2019-10-31 RX ADMIN — ERTAPENEM SODIUM 0.5 G: 1 INJECTION, POWDER, LYOPHILIZED, FOR SOLUTION INTRAMUSCULAR; INTRAVENOUS at 10:10

## 2019-10-31 RX ADMIN — DOPAMINE HYDROCHLORIDE 7.5 MCG/KG/MIN: 160 INJECTION, SOLUTION INTRAVENOUS at 03:10

## 2019-10-31 RX ADMIN — POTASSIUM CHLORIDE 20 MEQ: 14.9 INJECTION, SOLUTION INTRAVENOUS at 04:10

## 2019-10-31 RX ADMIN — FAMOTIDINE 20 MG: 10 INJECTION, SOLUTION INTRAVENOUS at 10:10

## 2019-10-31 RX ADMIN — BUMETANIDE 1 MG: 0.25 INJECTION INTRAMUSCULAR; INTRAVENOUS at 10:10

## 2019-10-31 RX ADMIN — FUROSEMIDE 80 MG: 10 INJECTION, SOLUTION INTRAVENOUS at 11:10

## 2019-10-31 RX ADMIN — ASCORBIC ACID, VITAMIN A PALMITATE, CHOLECALCIFEROL, THIAMINE HYDROCHLORIDE, RIBOFLAVIN-5 PHOSPHATE SODIUM, PYRIDOXINE HYDROCHLORIDE, NIACINAMIDE, DEXPANTHENOL, ALPHA-TOCOPHEROL ACETATE, VITAMIN K1, FOLIC ACID, BIOTIN, CYANOCOBALAMIN: 200; 3300; 200; 6; 3.6; 6; 40; 15; 10; 150; 600; 60; 5 INJECTION, SOLUTION INTRAVENOUS at 05:10

## 2019-10-31 RX ADMIN — LEVOTHYROXINE SODIUM 125 MCG: 25 TABLET ORAL at 05:10

## 2019-10-31 RX ADMIN — VANCOMYCIN HYDROCHLORIDE 1000 MG: 1 INJECTION, POWDER, LYOPHILIZED, FOR SOLUTION INTRAVENOUS at 05:10

## 2019-10-31 RX ADMIN — DOPAMINE HYDROCHLORIDE 5 MCG/KG/MIN: 160 INJECTION, SOLUTION INTRAVENOUS at 09:10

## 2019-10-31 NOTE — SUBJECTIVE & OBJECTIVE
Interval History: Sinus pauses overnight. Received emergent transvenous pacer. Remains intubated although now alert and following some basic commands. Minimal vent settings. No ostomy output. Urine output significantly improved with lasix.     Medications:  Continuous Infusions:   Amino acid 5% - dextrose 15% (CLINIMIX-E) solution with additives (1L  provides 510 kcal/L dextrose, with 50 gm AA, 150 gm CHO, Na 35, K 30, Mg 5, Ca 4.5, Acetate 80, Cl 39, Phos 15) 75 mL/hr at 10/30/19 1406    DOPamine 7.5 mcg/kg/min (10/31/19 1218)    lactated ringers Stopped (10/30/19 1700)    norepinephrine bitartrate-D5W Stopped (10/31/19 0003)     Scheduled Meds:   ertapenem (INVANZ) IVPB  500 mg Intravenous Daily    famotidine (PF)  20 mg Intravenous Daily    levothyroxine  125 mcg Oral Before breakfast    OXcarbazepine  300 mg Oral QHS    PHENobarbital  64.8 mg Oral Nightly    potassium chloride in water  20 mEq Intravenous Q2H    pravastatin  20 mg Oral Daily     PRN Meds:acetaminophen, albuterol-ipratropium, bumetanide, morphine, ondansetron, promethazine (PHENERGAN) IVPB, ramelteon, sodium chloride 0.9%     Review of patient's allergies indicates:   Allergen Reactions    Adhesive Itching and Blisters    Penicillins Anaphylaxis    Tramadol Hives    Avelox [moxifloxacin] Rash     Facial and arm itching and redness. Pt states throat closes when given.    Amoxil [amoxicillin]     Aspridrox [aspirin, buffered]     Codeine Other (See Comments)     Throat swelling    Keflex [cephalexin]      Tolerated cefepime    Norvasc [amlodipine]     Red dye Hives    Robitussin [guaifenesin]     Sulfa (sulfonamide antibiotics)     Tylenol [acetaminophen]      Has reaction to Tylenol with red dye and unable to take Extra Strength Tylenol/ CAN ONLY TOLERATE REG STRENGTH TYLENOL    Vicks vaporub [camphor-eucalyptus oil-menthol]      Objective:     Vital Signs (Most Recent):  Temp: 98.8 °F (37.1 °C) (10/31/19 1115)  Pulse: 89  (10/31/19 1230)  Resp: (!) 27 (10/31/19 1230)  BP: (!) 117/56 (10/31/19 1230)  SpO2: 97 % (10/31/19 1230) Vital Signs (24h Range):  Temp:  [97.7 °F (36.5 °C)-98.8 °F (37.1 °C)] 98.8 °F (37.1 °C)  Pulse:  [] 89  Resp:  [21-33] 27  SpO2:  [93 %-100 %] 97 %  BP: ()/(35-63) 117/56     Weight: 70.2 kg (154 lb 12.2 oz)  Body mass index is 30.23 kg/m².    Intake/Output - Last 3 Shifts       10/29 0700 - 10/30 0659 10/30 0700 - 10/31 0659 10/31 0700 - 11/01 0659    I.V. (mL/kg) 4057.7 (57.2) 966.2 (13.8)     NG/GT 40      IV Piggyback 250 450     TPN  367.5     Total Intake(mL/kg) 4347.7 (61.2) 1783.7 (25.4)     Urine (mL/kg/hr) 437 (0.3) 2299 (1.4) 300 (0.7)    Drains 710 1400     Stool 12 10 0    Total Output 1159 3709 300    Net +3188.7 -1925.4 -300           Stool Occurrence 1 x  1 x          Physical Exam   Cardiovascular: An irregularly irregular rhythm present. Tachycardia present.   Pulmonary/Chest:   Mechanical ventilation. Intubated.    Abdominal: She exhibits no distension. There is tenderness (grimaces with pain).   Midline dressing clean, dry, and intact. Colostomy with necrotic appearing mucosa. No stool.    Skin: Skin is warm and dry.   Nursing note and vitals reviewed.      Significant Labs:  CBC:   Recent Labs   Lab 10/31/19  1112   WBC 4.34   RBC 2.21*   HGB 7.3*   HCT 21.1*   PLT 41*   MCV 96   MCH 33.0*   MCHC 34.6     CMP:   Recent Labs   Lab 10/31/19  0410 10/31/19  1110   * 177*   CALCIUM 7.6* 7.6*   ALBUMIN 2.0* 1.9*   PROT 4.5*  --    * 124*   K 4.1 4.0   CO2 24 23   CL 92* 92*   BUN 62* 67*   CREATININE 2.5* 2.5*   ALKPHOS 45*  --    ALT 39  --    AST 62*  --    BILITOT 1.0  --        Significant Diagnostics:  I have reviewed all pertinent imaging results/findings within the past 24 hours.

## 2019-10-31 NOTE — SUBJECTIVE & OBJECTIVE
Interval History: On dopamine. Temporary pacemaker on.    Review of Systems   Unable to perform ROS: Intubated     Objective:     Vital Signs (Most Recent):  Temp: 98.8 °F (37.1 °C) (10/31/19 1115)  Pulse: 86 (10/31/19 1345)  Resp: (!) 25 (10/31/19 1345)  BP: (!) 109/55 (10/31/19 1345)  SpO2: 98 % (10/31/19 1345) Vital Signs (24h Range):  Temp:  [97.7 °F (36.5 °C)-98.8 °F (37.1 °C)] 98.8 °F (37.1 °C)  Pulse:  [] 86  Resp:  [21-33] 25  SpO2:  [93 %-100 %] 98 %  BP: ()/(35-63) 109/55     Weight: 70.2 kg (154 lb 12.2 oz)  Body mass index is 30.23 kg/m².    Intake/Output Summary (Last 24 hours) at 10/31/2019 1416  Last data filed at 10/31/2019 1300  Gross per 24 hour   Intake 1107.89 ml   Output 3354 ml   Net -2246.11 ml      Physical Exam   Constitutional: She appears well-developed. She appears lethargic. No distress. She is intubated.   Cardiovascular: Normal rate and regular rhythm.   Pulmonary/Chest: She is intubated.   Abdominal: Soft. There is no guarding.   Surgical dressing intact   Musculoskeletal: She exhibits edema. She exhibits no tenderness.   Neurological: She appears lethargic.   Nursing note and vitals reviewed.      Significant Labs: All pertinent labs within the past 24 hours have been reviewed.   Recent Labs   Lab 10/28/19  0046  10/30/19  1510 10/30/19  2102 10/31/19  0410 10/31/19  1110   *   < > 124* 124* 125* 124*   K 4.6   < > 4.3 3.7 4.1 4.0   CL 95   < > 92* 92* 92* 92*   CO2 21*   < > 22* 22* 24 23   BUN 27*   < > 55* 56* 62* 67*   CREATININE 1.6*   < > 2.4* 2.4* 2.5* 2.5*   CALCIUM 10.1   < > 7.1* 7.6* 7.6* 7.6*   PROT 6.1  --  3.9*  --  4.5*  --    BILITOT 1.1*  --  1.0  --  1.0  --    ALKPHOS 57  --  40*  --  45*  --    *  --  46*  --  39  --    *  --  61*  --  62*  --     < > = values in this interval not displayed.         Significant Imaging: I have reviewed all pertinent imaging results/findings within the past 24 hours.   TRANSTHORACIC ECHO (TTE)  COMPLETE 10/30/19:  · Normal left ventricular systolic function. The estimated ejection fraction is 55%  · No wall motion abnormalities.  · Concentric left ventricular remodeling.  · Normal right ventricular systolic function.  · Mild mitral regurgitation.  · Diastolic pattern consistent with atrial fibrillation observed.  Electroencephalogram 10/31/19:  EEG FINDINGS  Background activity:   The background rhythm was characterized by sharply contoured delta-theta (2-5 Hz) activity   No posterior dominant rhythm seen.   Symmetry and continuity: the background was continuous and symmetric  Sleep:    Not seen.  Activation procedures:   Photic stimulation and hyperventilation were not performed  Responsive to verbal stimuli with reactivity noted on EEG.  Abnormal activity:   Abundant bilateral broad sharp waves of triphasic morphology are seen in a periodic fashion, without evolution.  No electrographic seizures were seen.  Impression:  IMPRESSION:   This is an abnormal extended (1 hour and 4 minutes) EEG due to:  1) abundant bilateral periodic discharges seen of triphasic morphology, likely of metabolic etiology  2) severe generalized slowing seen, suggestive of severe diffuse or multifocal cerebral dysfunction.

## 2019-10-31 NOTE — ASSESSMENT & PLAN NOTE
-sinus pace with no escape rhythm; longest episode approximately 11 seconds  -Lytes WNL; repeat BMP with lytes WNL  -no recent use of BB or CCB  -no obvious reversible causes; no obvious non cardiac medications as cause of pause  -initially TCP at HR of 100; TVP placed with HR 80 and MA 10  -will continue to follow and continue TVP for now

## 2019-10-31 NOTE — PLAN OF CARE
Patient on  with documented settings.  Alarms are set and functioning with adequate volumes.  AMBU bag and mask at bedside.

## 2019-10-31 NOTE — PLAN OF CARE
Pt arouses to voice at times. Does not follow commands. On mech vent for respiratory support. On TPN for nutritional support. NGT to low, int suction. TVP site clean, dry, and intact. No output per colostomy. One BM per rectum, incontinence care provided. Martinez patent and draining clear, yellow urine. Family at bedside throughout the day. Bed in low position.  Will continue to monitor.

## 2019-10-31 NOTE — ASSESSMENT & PLAN NOTE
-no history of afib  -no admission EKG; EKG on 10/30 with afib with RVR with HR 120s; reports of consistent afib per ICU RNs  -on BB at home for HTN management; on hold since admission (10/27) due to acute issues  -no BB or CCB due to sinus pauses  -no chronic AC due to recent surgical intervention and risk of bleeding

## 2019-10-31 NOTE — CONSULTS
[facility]  Adult Nutrition  Consult Note    SUMMARY     Recommendations    Recommendation:   1. Add IVFE 3x weekly.   2. Continue current TPN as tolerated.   3. Monitor triglycerides while lipids infusing.     Goals:   Pt will tolerate TPN  Nutrition Goal Status: new  Communication of RD Recs: reviewed with RN(Lyric)    Reason for Assessment  Reason For Assessment: consult(intubated, TPN)  Diagnosis: (colon necrosis)  Relevant Medical History: HTN, petit mal epilepsy, scoliosis of lumbar spine, seozures, colon CA, cholecystectomy, appendectomy, sigmpidectomy  General Information Comments: Pt NPO. Intubated on vent. s/p sigmoid colectomy with end colostomy 10/29. NFPE completed today 10/31-mild wasting of temples & clavicles.   Nutrition Discharge Planning: d/c needs to be determined    Nutrition Risk Screen  Nutrition Risk Screen: tube feeding or parenteral nutrition    Nutrition/Diet History  Food Preferences: unable to assess  Spiritual, Cultural Beliefs, Confucianist Practices, Values that Affect Care: no  Factors Affecting Nutritional Intake: altered gastrointestinal function, NPO, on mechanical ventilation    Anthropometrics  Temp: 98 °F (36.7 °C)  Height: 5' (152.4 cm)  Height (inches): 60 in  Weight Method: Bed Scale  Weight: 70.2 kg (154 lb 12.2 oz)  Weight (lb): 154.76 lb  Ideal Body Weight (IBW), Female: 100 lb  % Ideal Body Weight, Female (lb): 154.76 lb  BMI (Calculated): 30.3  BMI Grade: 30 - 34.9- obesity - grade I     Lab/Procedures/Meds  Pertinent Labs Reviewed: reviewed  Pertinent Labs Comments: Na 125L, BUN 62H, Crea 2.5H, Glu 182H, Ca 7.6L, Alb 2.0L  Pertinent Medications Reviewed: reviewed  Pertinent Medications Comments: albumin, Lasix, dopamine    Estimated/Assessed Needs  Weight Used For Calorie Calculations: 70.2 kg (154 lb 12.2 oz)  Energy Calorie Requirements (kcal): 1388  Energy Need Method: [facility]  Protein Requirements: 84g (1.2g/kg)  Weight Used For Protein  Calculations: 70.2 kg (154 lb 12.2 oz)  RDA Method (mL): 1388     Nutrition Prescription Ordered  Current Diet Order: NPO  Current Nutrition Support Formula Ordered: Clinimix E 5/15  Current Nutrition Support Rate Ordered: 75 (ml)  Current Nutrition Support Frequency Ordered: ml/hr    Evaluation of Received Nutrient/Fluid Intake  Parenteral Calories (kcal): 1278  Parenteral Protein (gm): 90  Parenteral Fluid (mL): 1800  GIR (Glucose Infusion Rate) (mg/kg/min): 2.6 mg/kg/min  % Kcal Needs: 92  % Protein Needs: 107  I/O: 9393/4209  Comments: LBM 10/31  % Intake of Estimated Energy Needs: 75 - 100 %  % Meal Intake: NPO    Nutrition Risk  Level of Risk/Frequency of Follow-up: (2xweekly)     Assessment and Plan  * Colon necrosis  Contributing Nutrition Diagnosis  Altered GI Function    Related to (etiology):   S/p surgery    Signs and Symptoms (as evidenced by):   NPO/intubated    Interventions:  Parenteral nutrition    Nutrition Diagnosis Status:   New      Monitor and Evaluation  Food and Nutrient Intake: parenteral nutrition intake  Food and Nutrient Adminstration: enteral and parenteral nutrition administration  Physical Activity and Function: nutrition-related ADLs and IADLs  Anthropometric Measurements: weight  Biochemical Data, Medical Tests and Procedures: electrolyte and renal panel  Nutrition-Focused Physical Findings: overall appearance     Malnutrition Assessment  Restoration Region (Muscle Loss): mild depletion  Clavicle Bone Region (Muscle Loss): mild depletion       Nutrition Follow-Up  RD Follow-up?: Yes

## 2019-10-31 NOTE — ASSESSMENT & PLAN NOTE
-no history of afib  -no admission EKG; EKG on 10/30 with afib with RVR with HR 120s; reports of consistent afib per ICU RNs  -on BB at home for HTN management; on hold since admission (10/27) due to acute issues  -no BB or CCB due to sinus pauses  -no chronic AC due to recent surgical intervention and risk of bleeding  -HR 80s overnight

## 2019-10-31 NOTE — PROGRESS NOTES
[facility]  Cardiology  Progress Note    Patient Name: Annette Garcia  MRN: [id number]  Admission Date: 10/27/2019  Hospital Length of Stay: 3 days  Code Status: Full Code   Attending Physician: José Manuel Guidry MD   Primary Care Physician: Jose Valles MD  Expected Discharge Date:   Principal Problem:Colon necrosis    Subjective:     Hospital Course:   10/27/2019-10/29/2019 Per HPI. Underwent exp lap per General surgery with sigmoidectomy with colostomy with large amounts of turbid, murky fluid in abdomen. Admitted to ICU and remained vented post operatively with continued pressor use. Placed on TPN. Blood cultures with 2 out of 4 GNR resembling Ecoli on Invanz and Vancomycin   10/30/2019 Cardiology consulted STAT due to presence of pauses with longest pause of 11 seconds with ROSC. BP stable on IV Levophed. TCP at rate of 100 and transitioned to IV Dopamine. Emergent TVP in cath lab   10/31/2019 H&H down to 7.4&21.8 this AM. BUN 62 creatinine 2.5 K+ 4.1 Mg 3.7 down from 4.1. TVP in place with intermittent V paced rhythm. Remains intubated. BP marginal on IV Dopamine        Review of Systems   Unable to perform ROS: intubated     Objective:     Vital Signs (Most Recent):  Temp: 98.8 °F (37.1 °C) (10/31/19 1115)  Pulse: 82 (10/31/19 1316)  Resp: (!) 24 (10/31/19 1316)  BP: (!) 114/59 (10/31/19 1316)  SpO2: (!) 93 % (10/31/19 1316) Vital Signs (24h Range):  Temp:  [97.7 °F (36.5 °C)-98.8 °F (37.1 °C)] 98.8 °F (37.1 °C)  Pulse:  [] 82  Resp:  [21-33] 24  SpO2:  [93 %-100 %] 93 %  BP: ()/(35-63) 114/59     Weight: 70.2 kg (154 lb 12.2 oz)  Body mass index is 30.23 kg/m².     SpO2: (!) 93 %  O2 Device (Oxygen Therapy): ventilator      Intake/Output Summary (Last 24 hours) at 10/31/2019 1321  Last data filed at 10/31/2019 1300  Gross per 24 hour   Intake 1357.89 ml   Output 3604 ml   Net -2246.11 ml       Lines/Drains/Airways     Central Venous Catheter Line                 Port A  Cath Single Lumen right subclavian -- days         Percutaneous Central Line Insertion/Assessment - triple lumen  10/28/19 0420 3 days          Drain                 Urethral Catheter 10/28/19  Non-latex 16 Fr. 3 days         Colostomy 10/29/19 1200 LLQ 2 days         NG/OG Tube 10/28/19 1630 [place] sump Left nostril 2 days          Airway                 Airway - Non-Surgical 10/29/19 1015 Endotracheal Tube 2 days          Peripheral Intravenous Line                 Peripheral IV - Single Lumen 10/28/19 0227 20 G Anterior Forearm 3 days                Physical Exam   Constitutional: She has a sickly appearance. She appears ill. She is intubated.   Cardiovascular: Normal rate and regular rhythm.   Pulmonary/Chest: Effort normal. She is intubated.   Neurological:   Intubated        Significant Labs:     Recent Labs   Lab 10/31/19  1112   WBC 4.34   RBC 2.21*   HGB 7.3*   HCT 21.1*   PLT 41*   MCV 96   MCH 33.0*   MCHC 34.6     Recent Labs   Lab 10/31/19  1110   *   K 4.0   CL 92*   CO2 23   BUN 67*   CREATININE 2.5*   MG 3.7*       Significant Imaging: Echocardiogram:   Transthoracic echo (TTE) complete (Cupid Only):   Results for orders placed or performed during the hospital encounter of 10/27/19   Echo Color Flow Doppler? Yes   Result Value Ref Range    BSA 1.73 m2    Ascending aorta 2.61 cm    AV mean gradient 3 mmHg    Ao peak gennaro 1.23 m/s    Ao VTI 17.79 cm    IVS 1.07 0.6 - 1.1 cm    LA size 3.21 cm    Left Atrium Major Axis 5.12 cm    Left Atrium Minor Axis 4.83 cm    LVIDD 4.17 3.5 - 6.0 cm    LVIDS 2.93 2.1 - 4.0 cm    LVOT diameter 1.96 cm    LVOT peak VTI 14.74 cm    PW 0.98 0.6 - 1.1 cm    PV Peak D Gennaro 0.24 m/s    PV Peak S Gennaro 0.29 m/s    RA Major Axis 3.96 cm    RA Width 2.15 cm    RVDD 1.57 cm    TR Max Gennaro 2.06 m/s    LA WIDTH 2.65 cm    Ao root annulus 3.38 cm    AORTIC VALVE CUSP SEPERATION 1.48 cm    LV Diastolic Volume 77.15 mL    LV Systolic Volume 33.07 mL    LVOT peak gennaro 0.80 m/s    FS  30 %    LA volume 35.94 cm3    LV mass 140.48 g    Left Ventricle Relative Wall Thickness 0.47 cm    AV valve area 2.50 cm2    AV Velocity Ratio 0.65     AV index (prosthetic) 0.83     Pulm vein S/D ratio 1.21     LVOT area 3.0 cm2    LVOT stroke volume 44.45 cm3    AV peak gradient 6 mmHg    LV Systolic Volume Index 19.7 mL/m2    LV Diastolic Volume Index 45.94 mL/m2    LA Volume Index 21.4 mL/m2    LV Mass Index 84 g/m2    Triscuspid Valve Regurgitation Peak Gradient 17 mmHg    TDI SEPTAL 0.09 m/s    TDI LATERAL 0.16 m/s    Mean e' 0.13 m/s    Narrative    · Normal left ventricular systolic function. The estimated ejection   fraction is 55%  · No wall motion abnormalities.  · Concentric left ventricular remodeling.  · Normal right ventricular systolic function.  · Mild mitral regurgitation.  · Diastolic pattern consistent with atrial fibrillation observed.        Assessment and Plan:       Sinus pause  -sinus pace with no escape rhythm; longest episode approximately 11 seconds  -Lytes WNL; repeat BMP with lytes WNL  -no recent use of BB or CCB  -no obvious reversible causes; no obvious non cardiac medications as cause of pause  -initially TCP at HR of 100; TVP placed with HR 80 and MA 10  -will continue to follow and continue TVP for now    Paroxysmal atrial fibrillation  -no history of afib  -no admission EKG; EKG on 10/30 with afib with RVR with HR 120s; reports of consistent afib per ICU RNs  -on BB at home for HTN management; on hold since admission (10/27) due to acute issues  -no BB or CCB due to sinus pauses  -no chronic AC due to recent surgical intervention and risk of bleeding  -HR 80s overnight         VTE Risk Mitigation (From admission, onward)         Ordered     IP VTE LOW RISK PATIENT  Once      10/30/19 0811     Place sequential compression device  Until discontinued      10/28/19 0607                SERGEY Mena, ANP  Cardiology  [facility]

## 2019-10-31 NOTE — PLAN OF CARE
Recommendation:   1. Add IVFE 3x weekly.   2. Continue current TPN as tolerated.   3. Monitor triglycerides while lipids infusing.     Goals:   Pt will tolerate TPN  Nutrition Goal Status: new  Communication of RD Recs: reviewed with RNYaa)

## 2019-10-31 NOTE — PROGRESS NOTES
Reported to MICHELL Tejada, that the pt's hemoglobin dropped all the way to 7.4. Yesterday morning, the reading was 9.2. Also reported that pt barely put anything out of colostomy, and what has come out of colostomy is thin liquid. Pt however oozes liquid stool from rectum.

## 2019-10-31 NOTE — PROGRESS NOTES
[facility]  General Surgery  Progress Note    Subjective:     History of Present Illness:  No notes on file    Post-Op Info:  Procedure(s) (LRB):  Insertion, Pacemaker, Temporary Transvenous (Right)   1 Day Post-Op     Interval History: Sinus pauses overnight. Received emergent transvenous pacer. Remains intubated although now alert and following some basic commands. Minimal vent settings. No ostomy output. Urine output significantly improved with lasix.     Medications:  Continuous Infusions:   Amino acid 5% - dextrose 15% (CLINIMIX-E) solution with additives (1L  provides 510 kcal/L dextrose, with 50 gm AA, 150 gm CHO, Na 35, K 30, Mg 5, Ca 4.5, Acetate 80, Cl 39, Phos 15) 75 mL/hr at 10/30/19 1406    DOPamine 7.5 mcg/kg/min (10/31/19 1218)    lactated ringers Stopped (10/30/19 1700)    norepinephrine bitartrate-D5W Stopped (10/31/19 0003)     Scheduled Meds:   ertapenem (INVANZ) IVPB  500 mg Intravenous Daily    famotidine (PF)  20 mg Intravenous Daily    levothyroxine  125 mcg Oral Before breakfast    OXcarbazepine  300 mg Oral QHS    PHENobarbital  64.8 mg Oral Nightly    potassium chloride in water  20 mEq Intravenous Q2H    pravastatin  20 mg Oral Daily     PRN Meds:acetaminophen, albuterol-ipratropium, bumetanide, morphine, ondansetron, promethazine (PHENERGAN) IVPB, ramelteon, sodium chloride 0.9%     Review of patient's allergies indicates:   Allergen Reactions    Adhesive Itching and Blisters    Penicillins Anaphylaxis    Tramadol Hives    Avelox [moxifloxacin] Rash     Facial and arm itching and redness. Pt states throat closes when given.    Amoxil [amoxicillin]     Aspridrox [aspirin, buffered]     Codeine Other (See Comments)     Throat swelling    Keflex [cephalexin]      Tolerated cefepime    Norvasc [amlodipine]     Red dye Hives    Robitussin [guaifenesin]     Sulfa (sulfonamide antibiotics)     Tylenol [acetaminophen]      Has reaction to Tylenol with  red dye and unable to take Extra Strength Tylenol/ CAN ONLY TOLERATE REG STRENGTH TYLENOL    Vicks vaporub [camphor-eucalyptus oil-menthol]      Objective:     Vital Signs (Most Recent):  Temp: 98.8 °F (37.1 °C) (10/31/19 1115)  Pulse: 89 (10/31/19 1230)  Resp: (!) 27 (10/31/19 1230)  BP: (!) 117/56 (10/31/19 1230)  SpO2: 97 % (10/31/19 1230) Vital Signs (24h Range):  Temp:  [97.7 °F (36.5 °C)-98.8 °F (37.1 °C)] 98.8 °F (37.1 °C)  Pulse:  [] 89  Resp:  [21-33] 27  SpO2:  [93 %-100 %] 97 %  BP: ()/(35-63) 117/56     Weight: 70.2 kg (154 lb 12.2 oz)  Body mass index is 30.23 kg/m².    Intake/Output - Last 3 Shifts       10/29 0700 - 10/30 0659 10/30 0700 - 10/31 0659 10/31 0700 - 11/01 0659    I.V. (mL/kg) 4057.7 (57.2) 966.2 (13.8)     NG/GT 40      IV Piggyback 250 450     TPN  367.5     Total Intake(mL/kg) 4347.7 (61.2) 1783.7 (25.4)     Urine (mL/kg/hr) 437 (0.3) 2299 (1.4) 300 (0.7)    Drains 710 1400     Stool 12 10 0    Total Output 1159 3709 300    Net +3188.7 -1925.4 -300           Stool Occurrence 1 x  1 x          Physical Exam   Cardiovascular: An irregularly irregular rhythm present. Tachycardia present.   Pulmonary/Chest:   Mechanical ventilation. Intubated.    Abdominal: She exhibits no distension. There is tenderness (grimaces with pain).   Midline dressing clean, dry, and intact. Colostomy with necrotic appearing mucosa. No stool.    Skin: Skin is warm and dry.   Nursing note and vitals reviewed.      Significant Labs:  CBC:   Recent Labs   Lab 10/31/19  1112   WBC 4.34   RBC 2.21*   HGB 7.3*   HCT 21.1*   PLT 41*   MCV 96   MCH 33.0*   MCHC 34.6     CMP:   Recent Labs   Lab 10/31/19  0410 10/31/19  1110   * 177*   CALCIUM 7.6* 7.6*   ALBUMIN 2.0* 1.9*   PROT 4.5*  --    * 124*   K 4.1 4.0   CO2 24 23   CL 92* 92*   BUN 62* 67*   CREATININE 2.5* 2.5*   ALKPHOS 45*  --    ALT 39  --    AST 62*  --    BILITOT 1.0  --        Significant Diagnostics:  I have reviewed all  pertinent imaging results/findings within the past 24 hours.    Assessment/Plan:     Abdominal pain  84-year-old female s/p sigmoid colectomy with end colostomy 10/30/19.     -continue abx   -continue NG tube decompression  -monitor colostomy output  -given possible prolonged post op course, continue TPN.   -once NG output slows, can consider NG tube feeds.   -wean to extubate as tolerated   -remaining care per medical team          Charles Dumont MD  General Surgery  [facility]

## 2019-10-31 NOTE — PROGRESS NOTES
Progress Note  Nephrology      Consult Requested By: José Manuel Guidry MD      SUBJECTIVE:     Overnight events  Patient is a 84 y.o. female     Patient Active Problem List   Diagnosis    Renovascular hypertension    Epilepsy    Acquired hypothyroidism    Osteoarthritis of lumbar spine    Degenerative joint disease of sacroiliac joint    S/P exploratory laparotomy    DLBCL (diffuse large B cell lymphoma)    Chronic hyponatremia    Anemia due to antineoplastic chemotherapy    Bilateral renal artery stenosis    Closed comminuted fracture of right humerus    Closed displaced fracture of distal phalanx of right little finger    Age-related osteoporosis with current pathological fracture with routine healing    Closed wedge compression fracture of eleventh thoracic vertebra with routine healing    DDD (degenerative disc disease), lumbar    Chronic bilateral low back pain without sciatica    Age-related osteoporosis without current pathological fracture    Vitamin D deficiency    Uncontrolled hypertension    Subjective memory complaints    Depression    E. coli septic shock    Abnormal liver enzymes    Old MI (myocardial infarction)    Peripheral vascular disease, unspecified    History of stent insertion of left renal artery 7/19/17    Colon necrosis    Slow transit constipation    Fecal obstruction    E coli bacteremia    Abdominal pain    Paroxysmal atrial fibrillation    Sinus pause     Past Medical History:   Diagnosis Date    Cancer     colon    Hypertension     Petit mal epilepsy 1954    Scoliosis of lumbar spine     Seizures     Unspecified hypothyroidism               OBJECTIVE:     Vitals:    10/31/19 1015 10/31/19 1030 10/31/19 1045 10/31/19 1100   BP: (!) 129/59 (!) 122/59 121/60 (!) 118/58   BP Location:       Patient Position:       Pulse: 93 90 92 89   Resp: (!) 28 (!) 26 (!) 28 (!) 28   Temp:       TempSrc:       SpO2: 100% 100% 100% 100%   Weight:       Height:            Temp: 98 °F (36.7 °C) (10/31/19 0715)  Pulse: 89 (10/31/19 1100)  Resp: (!) 28 (10/31/19 1100)  BP: (!) 118/58 (10/31/19 1100)  SpO2: 100 % (10/31/19 1100)    Date 10/31/19 0700 - 11/01/19 0659   Shift 1406-0219 9012-0047 9326-3006 24 Hour Total   INTAKE   Shift Total(mL/kg)       OUTPUT   Urine(mL/kg/hr) 250   250   Shift Total(mL/kg) 250(3.6)   250(3.6)   Weight (kg) 70.2 70.2 70.2 70.2             Medications:   ertapenem (INVANZ) IVPB  500 mg Intravenous Daily    famotidine (PF)  20 mg Intravenous Daily    levothyroxine  125 mcg Oral Before breakfast    OXcarbazepine  300 mg Oral QHS    PHENobarbital  64.8 mg Oral Nightly    potassium chloride in water  40 mEq Intravenous Once    pravastatin  20 mg Oral Daily      Amino acid 5% - dextrose 15% (CLINIMIX-E) solution with additives (1L  provides 510 kcal/L dextrose, with 50 gm AA, 150 gm CHO, Na 35, K 30, Mg 5, Ca 4.5, Acetate 80, Cl 39, Phos 15) 75 mL/hr at 10/30/19 1406    DOPamine 5 mcg/kg/min (10/31/19 1055)    lactated ringers Stopped (10/30/19 1700)    norepinephrine bitartrate-D5W Stopped (10/31/19 0003)     Physical Exam:  General appearance: NAD  Lungs: diminished breath sounds  Intubated on 21 % O2  Heart: pulse 89  Abdomen: soft  Extremities: edema  Skin: dry  Laboratory:  ABG  Labs reviewed  Recent Results (from the past 336 hour(s))   Basic metabolic panel    Collection Time: 10/30/19  9:02 PM   Result Value Ref Range    Sodium 124 (L) 136 - 145 mmol/L    Potassium 3.7 3.5 - 5.1 mmol/L    Chloride 92 (L) 95 - 110 mmol/L    CO2 22 (L) 23 - 29 mmol/L    BUN, Bld 56 (H) 8 - 23 mg/dL    Creatinine 2.4 (H) 0.5 - 1.4 mg/dL    Calcium 7.6 (L) 8.7 - 10.5 mg/dL    Anion Gap 10 8 - 16 mmol/L   Basic metabolic panel    Collection Time: 10/30/19  1:42 PM   Result Value Ref Range    Sodium 125 (L) 136 - 145 mmol/L    Potassium 4.6 3.5 - 5.1 mmol/L    Chloride 92 (L) 95 - 110 mmol/L    CO2 20 (L) 23 - 29 mmol/L    BUN, Bld 53 (H) 8 - 23 mg/dL     Creatinine 2.3 (H) 0.5 - 1.4 mg/dL    Calcium 7.7 (L) 8.7 - 10.5 mg/dL    Anion Gap 13 8 - 16 mmol/L   Basic Metabolic Panel (BMP)    Collection Time: 10/30/19  5:22 AM   Result Value Ref Range    Sodium 124 (L) 136 - 145 mmol/L    Potassium 4.7 3.5 - 5.1 mmol/L    Chloride 93 (L) 95 - 110 mmol/L    CO2 20 (L) 23 - 29 mmol/L    BUN, Bld 51 (H) 8 - 23 mg/dL    Creatinine 2.1 (H) 0.5 - 1.4 mg/dL    Calcium 7.6 (L) 8.7 - 10.5 mg/dL    Anion Gap 11 8 - 16 mmol/L     Recent Results (from the past 336 hour(s))   CBC with Automated Differential    Collection Time: 10/31/19  4:10 AM   Result Value Ref Range    WBC 4.36 3.90 - 12.70 K/uL    Hemoglobin 7.4 (L) 12.0 - 16.0 g/dL    Hematocrit 21.8 (L) 37.0 - 48.5 %    Platelets 48 (L) 150 - 350 K/uL   CBC with Automated Differential    Collection Time: 10/30/19  5:22 AM   Result Value Ref Range    WBC 5.56 3.90 - 12.70 K/uL    Hemoglobin 9.2 (L) 12.0 - 16.0 g/dL    Hematocrit 27.0 (L) 37.0 - 48.5 %    Platelets 69 (L) 150 - 350 K/uL   CBC with Automated Differential    Collection Time: 10/29/19  4:42 AM   Result Value Ref Range    WBC 2.65 (L) 3.90 - 12.70 K/uL    Hemoglobin 11.9 (L) 12.0 - 16.0 g/dL    Hematocrit 36.0 (L) 37.0 - 48.5 %    Platelets 126 (L) 150 - 350 K/uL     Urinalysis  No results for input(s): COLORU, CLARITYU, SPECGRAV, PHUR, PROTEINUA, GLUCOSEU, BILIRUBINCON, BLOODU, WBCU, RBCU, BACTERIA, MUCUS, NITRITE, LEUKOCYTESUR, UROBILINOGEN, HYALINECASTS in the last 24 hours.    Diagnostic Results:  X-Ray: Reviewed  US: Reviewed  Echo: Reviewed  ACCESS    ASSESSMENT/PLAN:   S/p Exploratory laparotomy, sigmoid colectomy, end colostomy  JAS  UO 2349 cc /24 h  Hyponatremia  Potassium 4  Replace K  Metabolic bone disease  Anemia  Hb 7.4  Iron study  Epogen  Poor nutrition  Hypotension  Blood pressure 118/58  On dopamine  Diuresis  TPN   Discussed with family

## 2019-10-31 NOTE — HPI
83yo female with history of renovascular HTN, B cell lymphoma, PAD s/p left renal stent, anemia secondary to chemotherapy and hypothyroidism who presented to the ER with complaints of abdominal pain. Cardiology was consulted emergently due to pause/asystole noted on the monitor. Her chart was briefly reviewed and HPI obtained from admit note. Her symptoms started the day prior to admission and was associated with difficulty urinating and constipation that did not resolve with  Miralax and Senokot without relief. She reported vomiting as well as weakness. Of note, she was recently prescribed mood stabilizer.     Upon presentation to the ER, her labs revealed MCV (100), Lactic Acid (5.8), Na (131), CO2 (21), Glucose (154), Bun (27), Cr (1.6), total Bili (1.1), AST (487), aLT (361), CT abdomen and pelvis revealed  small bowel wall thickening with increased mucosal enhancement consistent with acute enteritis,  and possible constipation and fecal impaction. She became  hypotensive in the ER with initiation of IV pressors with Levophed. She was admitted to  [facility] for further evaluation and treatment with General Surgery consulted.

## 2019-10-31 NOTE — PT/OT/SLP PROGRESS
Occupational Therapy  Missed Visit    Patient Name:  Annette Garcia   MRN:  [id number]    Patient not seen today secondary to  EEG in progress.  Pt remains intubated, not sedated, no command following noted. Will follow-up .    Ronnie Sutton OT  10/31/2019

## 2019-10-31 NOTE — PROGRESS NOTES
2030 - Dr. Vale updated on urine output. States to check the pt's potassium and magnesium. If potassium is below 4.5, give 20 mEqs IVPB. If it is below 3.5, give 40 mEqs IVPB. If magnesium is below 1.9, give 2 grams. IVPB.

## 2019-10-31 NOTE — ASSESSMENT & PLAN NOTE
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     -continue abx   -continue NG tube decompression  -monitor colostomy output  -given possible prolonged post op course, continue TPN.   -once NG output slows, can consider NG tube feeds.   -wean to extubate as tolerated   -remaining care per medical team

## 2019-10-31 NOTE — CONSULTS
[facility]  Cardiology  Consult Note    Patient Name: Annette Garcia  MRN: [id number]  Admission Date: 10/27/2019  Hospital Length of Stay: 2 days  Code Status: Full Code   Attending Provider: José Manuel Guidry MD  Consulting Provider: SERGEY Mena ANP  Primary Care Physician: Jose Valles MD  Principal Problem:Colon necrosis    Patient information was obtained from past medical records.     Inpatient consult to [facility]  Consult performed by: SERGEY Grier ANP  Consult ordered by: José Manuel Guidry MD  Reason for consult: sinus pause         Subjective:     Chief Complaint:  Abdominal pain      HPI:   83yo female with history of renovascular HTN, B cell lymphoma, PAD s/p left renal stent, anemia secondary to chemotherapy and hypothyroidism who presented to the ER with complaints of abdominal pain. Cardiology was consulted emergently due to pause/asystole noted on the monitor. Her chart was briefly reviewed and HPI obtained from admit note. Her symptoms started the day prior to admission and was associated with difficulty urinating and constipation that did not resolve with  Miralax and Senokot without relief. She reported vomiting as well as weakness. Of note, she was recently prescribed mood stabilizer.     Upon presentation to the ER, her labs revealed MCV (100), Lactic Acid (5.8), Na (131), CO2 (21), Glucose (154), Bun (27), Cr (1.6), total Bili (1.1), AST (487), aLT (361), CT abdomen and pelvis revealed  small bowel wall thickening with increased mucosal enhancement consistent with acute enteritis,  and possible constipation and fecal impaction. She became  hypotensive in the ER with initiation of IV pressors with Levophed. She was admitted to  [facility] for further evaluation and treatment with General Surgery consulted.      Hospital Course:  10/27/2019-10/29/2019 Per HPI. Underwent exp lap per General surgery with sigmoidectomy with  colostomy with large amounts of turbid, murky fluid in abdomen. Admitted to ICU and remained vented post operatively with continued pressor use. Placed on TPN. Blood cultures with 2 out of 4 GNR resembling Ecoli on Invanz and Vancomycin   10/30/2019 Cardiology consulted STAT due to presence of pauses with longest pause of 11 seconds with ROSC. BP stable on IV Levophed. TCP at rate of 100 and transitioned to IV Dopamine. Emergent TVP in cath lab     No new subjective & objective note has been filed under this hospital service since the last note was generated.    Assessment and Plan:     Sinus pause  -sinus pace with no escape rhythm; longest episode approximately 11 seconds  -Lytes this AM WNL; repeat BMP with normal K+ and Mg 4.1  -no recent use of BB or CCB  -no obvious reversible causes  -initially TCP at HR of 100; emergent TVP placed with HR 80 and MA 10  -will continue to follow; if permanent pacemaker needed will need to have ongoing discussion with family and await until infections cleared (GNR in blood)     Paroxysmal atrial fibrillation  -no history of afib  -no admission EKG; EKG on 10/30 with afib with RVR with HR 120s; reports of consistent afib per ICU RNs  -on BB at home for HTN management; on hold since admission (10/27) due to acute issues  -no BB or CCB due to sinus pauses  -no chronic AC due to recent surgical intervention and risk of bleeding        VTE Risk Mitigation (From admission, onward)         Ordered     heparin infusion 1,000 units/500 ml in 0.9% NaCl (pressure line flush)  Intra-op continuous PRN      10/30/19 1721     IP VTE LOW RISK PATIENT  Once      10/30/19 0811     Place sequential compression device  Until discontinued      10/28/19 0607                Thank you for your consult. We will continue to follow     Jeimy Lea, APRN, ANP  Cardiology   [facility]

## 2019-10-31 NOTE — HOSPITAL COURSE
10/27/2019-10/29/2019 Per HPI. Underwent exp lap per General surgery with sigmoidectomy with colostomy with large amounts of turbid, murky fluid in abdomen. Admitted to ICU and remained vented post operatively with continued pressor use. Placed on TPN. Blood cultures with 2 out of 4 GNR resembling Ecoli on Invanz and Vancomycin   10/30/2019 Cardiology consulted STAT due to presence of pauses with longest pause of 11 seconds with ROSC. BP stable on IV Levophed. TCP at rate of 100 and transitioned to IV Dopamine. Emergent TVP in cath lab   10/31/2019 H&H down to 7.4&21.8 this AM. BUN 62 creatinine 2.5 K+ 4.1 Mg 3.7 down from 4.1. TVP in place with intermittent V paced rhythm. Remains intubated. BP marginal on IV Dopamine  11/1/2019 Remains intubated and on IV Dopamine with increased requirements this AM due to MAP less than 60. TVP remains in place with NSR HR 80 currently. Episode overnight of V paced rhythm-will decrease TVP to 70bpm today.  K+ 4.8 Mg 3.6 BUN 83 creatinine 3.1. Minimal response with no sedative medications on board   11/2/2019 Overnight received PRBC transfusion with appropriate response. Tolerating CRRT well. No events on tele. Intermittent afib. Currently on SBT.   11/3/2019 Overnight no acute events. BP stable. No pacing overnight. Extubated and responsive   11/4/2019 Remains extubated. HR 110s-120s overnight with no recurrent pauses or bradycardia overnight. No pacemaker use in over 48 hours. H&H 7.0&21.4 K+ 3.8 BUN 81 creatinine 2.7. On CRRT with net zero volume removal. +6.6 liters since admission

## 2019-10-31 NOTE — PROCEDURES
EXTENDED  ELECTROENCEPHALOGRAM  REPORT    Annette Garcia  [id number]  1935    DATE OF SERVICE: 10/31/19    DATE OF ADMISSION: 10/27/2019  9:05 PM    ADMITTING/REQUESTING PROVIDER:  Alice Barker MD    METHODOLOGY   Electroencephalographic (EEG) recording is with electrodes placed according to the International 10-20 placement system.  Thirty two (32) channels of digital signal (sampling rate of 512/sec) including T1 and T2 was simultaneously recorded from the scalp and may include  EKG, EMG, and/or eye monitors.  Recording band pass was 0.1 to 512 hz.  Digital video recording of the patient is simultaneously recorded with the EEG.  The patient is instructed report clinical symptoms which may occur during the recording session.  EEG and video recording is stored and archived in digital format.  Activation procedures which include photic stimulation, hyperventilation and instructing patients to perform simple task are done in selected patients.   The EEG is displayed on a monitor screen and can be reviewed using different montages.  Computer assisted analysis is employed to detect spike and electrographic seizure activity.   The entire record is submitted for computer analysis.  The entire recording is visually reviewed and the times identified by computer analysis as being spikes or seizures are reviewed again.  Compresses spectral analysis (CSA) is also performed on the activity recorded from each individual channel.  This is displayed as a power display of frequencies from 0 to 30 Hz over time.   The CSA is reviewed looking for asymmetries in power between homologous areas of the scalp and then compared with the original EEG recording.     [employer] software was also utilized in the review of this study.  This software suite analyzes the EEG recording in multiple domains.  Coherence and rhythmicity is computed to identify EEG sections which may contain organized seizures.  Each channel undergoes  analysis to detect presence of spike and sharp waves which have special and morphological characteristic of epileptic activity.  The routine EEG recording is converted from spacial into frequency domain.  This is then displayed comparing homologous areas to identify areas of significant asymmetry.  Algorithm to identify non-cortically generated artifact is used to separate eye movement, EMG and other artifact from the EEG.      RECORDING TIMES  Start on 10/31/19, 09:30  Stop on 10/31/19, 10:34    A total of 1 hour and 4 minutes of EEG recording was obtained.    EEG FINDINGS  Background activity:   The background rhythm was characterized by sharply contoured delta-theta (2-5 Hz) activity   No posterior dominant rhythm seen.   Symmetry and continuity: the background was continuous and symmetric        Sleep:    Not seen.    Activation procedures:   Photic stimulation and hyperventilation were not performed  Responsive to verbal stimuli with reactivity noted on EEG.    Abnormal activity:   Abundant bilateral broad sharp waves of triphasic morphology are seen in a periodic fashion, without evolution.        No electrographic seizures were seen.    EKG:   Regular rhythm seen    IMPRESSION:   This is an abnormal extended (1 hour and 4 minutes) EEG due to:  1) abundant bilateral periodic discharges seen of triphasic morphology, likely of metabolic etiology  2) severe generalized slowing seen, suggestive of severe diffuse or multifocal cerebral dysfunction.    CLINICAL CORRELATION IS RECOMMENDED  Findings were communicated to  at the time of the study.    Abbi Schaefer MD, ENOC(), NASREEN MOHR.  Neurology-Epilepsy.  [facility].

## 2019-10-31 NOTE — ASSESSMENT & PLAN NOTE
Contributing Nutrition Diagnosis  Altered GI Function    Related to (etiology):   S/p surgery    Signs and Symptoms (as evidenced by):   NPO/intubated    Interventions:  Parenteral nutrition    Nutrition Diagnosis Status:   Improving (on [place] diet)

## 2019-10-31 NOTE — PROGRESS NOTES
Pharmacokinetic Assessment Follow Up: IV Vancomycin    Vancomycin serum concentration assessment(s):    The random level was drawn correctly and can be used to guide therapy at this time. The measurement is below the desired definitive target range of 15 to 20 mcg/mL.    Vancomycin Regimen Plan:    Redose with vancomycin 1g x1 and obtain repeat random level on 11/1 @1200.    Drug levels (last 3 results):  Recent Labs   Lab Result Units 10/31/19  1110   Vancomycin, Random ug/mL 13.7       Pharmacy will continue to follow and monitor vancomycin.    Please contact pharmacy at extension [phone] for questions regarding this assessment.    Thank you for the consult,   Trudy Thomas       Patient brief summary:  Annette Garcia is a 84 y.o. female initiated on antimicrobial therapy with IV Vancomycin for treatment of intra-abdominal infection    The patient's current regimen is dose by levels    Drug Allergies:   Review of patient's allergies indicates:   Allergen Reactions    Adhesive Itching and Blisters    Penicillins Anaphylaxis    Tramadol Hives    Avelox [moxifloxacin] Rash     Facial and arm itching and redness. Pt states throat closes when given.    Amoxil [amoxicillin]     Aspridrox [aspirin, buffered]     Codeine Other (See Comments)     Throat swelling    Keflex [cephalexin]      Tolerated cefepime    Norvasc [amlodipine]     Red dye Hives    Robitussin [guaifenesin]     Sulfa (sulfonamide antibiotics)     Tylenol [acetaminophen]      Has reaction to Tylenol with red dye and unable to take Extra Strength Tylenol/ CAN ONLY TOLERATE REG STRENGTH TYLENOL    Vicks vaporub [camphor-eucalyptus oil-menthol]        Actual Body Weight:   70kg    Renal Function:   Estimated Creatinine Clearance: 15.2 mL/min (A) (based on SCr of 2.5 mg/dL (H)).,     Dialysis Method (if applicable):  N/A    CBC (last 72 hours):  Recent Labs   Lab Result Units 10/28/19  1724 10/29/19  0442 10/30/19  0522 10/31/19  0410 10/31/19  1112    WBC K/uL 5.04 2.65* 5.56 4.36 4.34   Hemoglobin g/dL 12.0 11.9* 9.2* 7.4* 7.3*   Hematocrit % 35.8* 36.0* 27.0* 21.8* 21.1*   Platelets K/uL 139* 126* 69* 48* 41*   Gran% % 76.0* 12.0* 69.0 65.0 79.5*   Lymph% % 19.0 20.0 7.0* 20.0 7.6*   Mono% % 4.8 1.0* 1.0* 3.0* 1.6*   Eosinophil% % 0.2 0.0 1.0 1.0 1.4   Basophil% % 0.0 0.0 0.0 0.0 0.0   Differential Method  Automated Manual Manual Manual automated       Metabolic Panel (last 72 hours):  Recent Labs   Lab Result Units 10/28/19  1724 10/29/19  0442 10/29/19  1351 10/30/19  0522 10/30/19  0940 10/30/19  1342 10/30/19  1510 10/30/19  2102 10/31/19  0410 10/31/19  1110   Sodium mmol/L 131* 127* 126* 124*  --  125* 124* 124* 125* 124*   Sodium Urine Random mmol/L  --   --   --   --  <20*  --   --   --   --   --    Potassium mmol/L 4.8 5.0 5.0 4.7  --  4.6 4.3 3.7 4.1 4.0   Chloride mmol/L 97 95 95 93*  --  92* 92* 92* 92* 92*   CO2 mmol/L 23 21* 22* 20*  --  20* 22* 22* 24 23   Glucose mg/dL 122* 124* 97 86  --  85 200* 205* 182* 177*   Glucose, UA  Negative  --   --   --   --   --   --   --   --   --    BUN, Bld mg/dL 40* 44* 46* 51*  --  53* 55* 56* 62* 67*   Creatinine mg/dL 2.4* 2.1* 2.1* 2.1*  --  2.3* 2.4* 2.4* 2.5* 2.5*   Albumin g/dL  --   --  1.9*  --   --   --  2.2*  --  2.0* 1.9*   Total Bilirubin mg/dL  --   --   --   --   --   --  1.0  --  1.0  --    Alkaline Phosphatase U/L  --   --   --   --   --   --  40*  --  45*  --    AST U/L  --   --   --   --   --   --  61*  --  62*  --    ALT U/L  --   --   --   --   --   --  46*  --  39  --    Magnesium mg/dL 4.9*  --  4.6*  --   --  4.1*  --  3.7*  --  3.7*   Phosphorus mg/dL  --   --  6.5* 5.2*  --   --   --  4.0  --   --        Vancomycin Administrations:  vancomycin given in the last 96 hours                     vancomycin 1.5 g in dextrose 5 % 250 mL IVPB (ready to mix) (mg) 1,500 mg New Bag 10/30/19 2317                      Microbiologic Results:  Microbiology Results (last 7 days)       Procedure  Component Value Units Date/Time    Blood Culture #1 **CANNOT BE ORDERED STAT** [[id number]]  (Abnormal)  (Susceptibility) Collected:  10/28/19 0049    Order Status:  Completed Specimen:  Blood from Peripheral, Right  Wrist Updated:  10/30/19 1024     Blood Culture, Routine Gram stain aer bottle: Gram negative rods       Results called to and read back by: Kinga Bateman RN  10/28/2019  19:01      ESCHERICHIA COLI    Blood Culture #2 **CANNOT BE ORDERED STAT** [[id number]] Collected:  10/28/19 0049    Order Status:  Sent Specimen:  Blood from Peripheral, Hand, Left Updated:  10/28/19 0050

## 2019-10-31 NOTE — PROGRESS NOTES
History & Physical  [facility]        SUBJECTIVE:     Interval Hx:  Episodes of sinus pauses yesterday requiring TCP and ultimately TVP placement. Levo changed to Dopamine for hr. Still remains somnolent and unarousable. Withdrawing to pain.       OBJECTIVE:     Vital Signs  Vitals:    10/31/19 0930 10/31/19 0945 10/31/19 1000 10/31/19 1015   BP: (!) 113/57 (!) 113/56 (!) 113/56    BP Location:       Patient Position:       Pulse: 88 86 87 93   Resp: (!) 27 (!) 24 (!) 26 (!) 28   Temp:       TempSrc:       SpO2: 100% 100% 100% 100%   Weight:   70.2 kg (154 lb 12.2 oz)    Height:   5' (1.524 m)      Body mass index is 30.23 kg/m².    Physical Exam:  General: No acute distress  Eyes:  conjunctivae/corneas clear  Nose: no discharge  Neck: trachea midline with no masses appreciated  Lungs:  Intubated; not tachypneic, normal respiratory effort, no wheezes, no rales;   Heart: regular rate and rhythm and no murmur  Abdomen: non-distended; dusky appearing stoma; ex lap bandage in place  Extremities: no cyanosis, no edema, no clubbing  Skin: No rashes or lesions. good skin turgor  Neurologic: not awake or alert; withdraws to pain    Laboratory:  Lab Results   Component Value Date    WBC 4.36 10/31/2019    HGB 7.4 (L) 10/31/2019    HCT 21.8 (L) 10/31/2019    MCV 97 10/31/2019    PLT 48 (L) 10/31/2019       ASSESSMENT/PLAN:     Neuro  Acute Encephalopathy  -not currently on sedation  -Hx of Epilepsy; on home phenobarbital, trileptal -checking levels today,   -(phenobarb is renally and gi cleared and oxcarbazepine is renally cleared)  -?Electrolyte derangements (Na 126)   -Uosm elevated; Tia low; likely hypervolemic hyponatremia  -recommend continue diuresis and hypotonic fluid restriction; will need to discuss changing TPN formula as it is hypotonic  -follow Na every 4h (do not correct > 0.5meq/h or 12 in a 24h period  -Critical illness encephalopathy      CV  Shock  -likely septic shock in the setting of bowel  necrosis  -currently requiring pressor support  -may not have achieved adequate source control given spillage of bowel contents  -abx broadened (currently on Vanc and ertapenem); no cx from abd fluid sent  Sinus Pause  -electrolytes wnl  -cards consulted and TVP placed; currently on dopamine; appears to be almost 100% paced    Resp  Acute Respiratory Failure  -returned from OR on vent; unable to wean d/t mental status  -failed SBT again this AM; mostly d/t to mental status  -daily SBT  -continue LPMV    Renal  JAS  -Cr 2.5  -volume overloaded; continue to diurese  -renal dose meds  Hyponatremia  -Na continuing to fall with fluid andministration; diuresed yesterday and Na stable but still 125; however, no initiated on TPN  -Urine studies c/w hypervolemic hyponatremia; would continue to diurese  -f/u BMP every 4 hours      FEN  I/O: +5.8L (net negative yesterday)  Electrolytes  -Hyponatremia; see above    GI  Bowel Necrosis s/p resection and colostomy placement  -still with absent Bowel sounds and minimal output via stoma    Heme  Leukopenia  -?secondary to sepsis vs drugs (on AEDs, checking levels, on beta lactams)  Thrombocytopenia  -check PBS; PT/PTT, fibrinogen, d-dimer  -no currently on any heparin products    ID  Septic Shock  -BCx +GNR; now E. coli  -currently on Invanz and vanc   -abx broadened as still in shock and in setting of suspected polymicrobial infection d/t gut spillage    Core  -GI ppx w/ pepcid  -DVT ppx; consider ppx      Kobe Hargrove MD  Pulm/CC Fellow PGY 6

## 2019-10-31 NOTE — PLAN OF CARE
Pt has been comfortable with mechanical ventilation despite not having any sedation. She is seen to move all her extremities, but she does not obey commands. Pt was able to be weaned off the levophed, but dopamine is still necessary to keep the pt's MAP above 65 mmHg. Pt's urine output has slowed, but this could be attributed to the reduced bioavailability of Lasix since its administration. Pt's heart rate lowers and increases based on the dopamine rate.

## 2019-10-31 NOTE — NURSING
Pt received from cath lab, TVP to left IJ, pacer spikes appear to be after QRS, pt stable, BP stable,  Pt looks to be in sinus rhythm, EKG ordered    Site cdi, no swelling or hematoma noted, extra battery taped to back of pacer

## 2019-10-31 NOTE — ASSESSMENT & PLAN NOTE
-sinus pace with no escape rhythm; longest episode approximately 11 seconds  -Lytes this AM WNL; repeat BMP with normal K+ and Mg 4.1  -no recent use of BB or CCB  -no obvious reversible causes  -initially TCP at HR of 100; emergent TVP placed with HR 80 and MA 10  -will continue to follow; if permanent pacemaker needed will need to have ongoing discussion with family and await until infections cleared (GNR in blood)

## 2019-10-31 NOTE — SUBJECTIVE & OBJECTIVE
Review of Systems   Unable to perform ROS: intubated     Objective:     Vital Signs (Most Recent):  Temp: 98.8 °F (37.1 °C) (10/31/19 1115)  Pulse: 82 (10/31/19 1316)  Resp: (!) 24 (10/31/19 1316)  BP: (!) 114/59 (10/31/19 1316)  SpO2: (!) 93 % (10/31/19 1316) Vital Signs (24h Range):  Temp:  [97.7 °F (36.5 °C)-98.8 °F (37.1 °C)] 98.8 °F (37.1 °C)  Pulse:  [] 82  Resp:  [21-33] 24  SpO2:  [93 %-100 %] 93 %  BP: ()/(35-63) 114/59     Weight: 70.2 kg (154 lb 12.2 oz)  Body mass index is 30.23 kg/m².     SpO2: (!) 93 %  O2 Device (Oxygen Therapy): ventilator      Intake/Output Summary (Last 24 hours) at 10/31/2019 1321  Last data filed at 10/31/2019 1300  Gross per 24 hour   Intake 1357.89 ml   Output 3604 ml   Net -2246.11 ml       Lines/Drains/Airways     Central Venous Catheter Line                 Port A Cath Single Lumen right subclavian -- days         Percutaneous Central Line Insertion/Assessment - triple lumen  10/28/19 0420 3 days          Drain                 Urethral Catheter 10/28/19  Non-latex 16 Fr. 3 days         Colostomy 10/29/19 1200 LLQ 2 days         NG/OG Tube 10/28/19 1630 [place] sump Left nostril 2 days          Airway                 Airway - Non-Surgical 10/29/19 1015 Endotracheal Tube 2 days          Peripheral Intravenous Line                 Peripheral IV - Single Lumen 10/28/19 0227 20 G Anterior Forearm 3 days                Physical Exam   Constitutional: She has a sickly appearance. She appears ill. She is intubated.   Cardiovascular: Normal rate and regular rhythm.   Pulmonary/Chest: Effort normal. She is intubated.   Neurological:   Intubated        Significant Labs:     Recent Labs   Lab 10/31/19  1112   WBC 4.34   RBC 2.21*   HGB 7.3*   HCT 21.1*   PLT 41*   MCV 96   MCH 33.0*   MCHC 34.6     Recent Labs   Lab 10/31/19  1110   *   K 4.0   CL 92*   CO2 23   BUN 67*   CREATININE 2.5*   MG 3.7*       Significant Imaging: Echocardiogram:   Transthoracic echo (TTE)  complete (Cupid Only):   Results for orders placed or performed during the hospital encounter of 10/27/19   Echo Color Flow Doppler? Yes   Result Value Ref Range    BSA 1.73 m2    Ascending aorta 2.61 cm    AV mean gradient 3 mmHg    Ao peak gennaro 1.23 m/s    Ao VTI 17.79 cm    IVS 1.07 0.6 - 1.1 cm    LA size 3.21 cm    Left Atrium Major Axis 5.12 cm    Left Atrium Minor Axis 4.83 cm    LVIDD 4.17 3.5 - 6.0 cm    LVIDS 2.93 2.1 - 4.0 cm    LVOT diameter 1.96 cm    LVOT peak VTI 14.74 cm    PW 0.98 0.6 - 1.1 cm    PV Peak D Gennaro 0.24 m/s    PV Peak S Gennaro 0.29 m/s    RA Major Axis 3.96 cm    RA Width 2.15 cm    RVDD 1.57 cm    TR Max Gennaro 2.06 m/s    LA WIDTH 2.65 cm    Ao root annulus 3.38 cm    AORTIC VALVE CUSP SEPERATION 1.48 cm    LV Diastolic Volume 77.15 mL    LV Systolic Volume 33.07 mL    LVOT peak gennaro 0.80 m/s    FS 30 %    LA volume 35.94 cm3    LV mass 140.48 g    Left Ventricle Relative Wall Thickness 0.47 cm    AV valve area 2.50 cm2    AV Velocity Ratio 0.65     AV index (prosthetic) 0.83     Pulm vein S/D ratio 1.21     LVOT area 3.0 cm2    LVOT stroke volume 44.45 cm3    AV peak gradient 6 mmHg    LV Systolic Volume Index 19.7 mL/m2    LV Diastolic Volume Index 45.94 mL/m2    LA Volume Index 21.4 mL/m2    LV Mass Index 84 g/m2    Triscuspid Valve Regurgitation Peak Gradient 17 mmHg    TDI SEPTAL 0.09 m/s    TDI LATERAL 0.16 m/s    Mean e' 0.13 m/s    Narrative    · Normal left ventricular systolic function. The estimated ejection   fraction is 55%  · No wall motion abnormalities.  · Concentric left ventricular remodeling.  · Normal right ventricular systolic function.  · Mild mitral regurgitation.  · Diastolic pattern consistent with atrial fibrillation observed.

## 2019-10-31 NOTE — PROGRESS NOTES
[facility]  Progress Note    Patient Name: Annette Garcia  MRN: [id number]  Patient Class: IP- Inpatient   Admission Date: 10/27/2019  Length of Stay: 3 days  Attending Physician: José Manuel Guidry MD  Primary Care Provider: Jose Valles MD        Subjective:     Principal Problem:Colon necrosis        HPI:  Annette Garcia is an 84 year old white woman with peripheral vascular disease, renovascular hypertension, renal artery stenosis status post left renal artery stent placement on 7/19/17, coronary artery disease with history of myocardial infarction, diffuse large B cell lymphoma, anemia, epilepsy, hypothyroidism, chronic hyponatremia, depression, lumbar and sacroiliac joint osteoarthritis with chronic low back pain and history of lumbar spine surgery in 2013, slow transit constipation, history of appendectomy, history of cholecystectomy history hysterectomy, history of small bowel obstruction status post small bowel resection on 8/23/16. She lives in [place], [place]. Her son has epilepsy. Her daughter has tuberous sclerosis. Her primary care physician is Dr. Jose Torres. Her pain management specialist is Dr. Chirag Bates. Her neurologist is Dr. Gamal Lock.    She presented to [facility] on 10/27/19 with abdominal pain, difficulty urinating, and constipation for one day. She took polyethylene glycol and senna-docusate without relief. She strained during her last attempt at having a bowel movement. She had one episode of vomiting on the day of presentation. She felt weak when walking. In the emergency department, she was found to have acute kidney injury (BUN 27, creatinine 1.6, from 9 and 0.7 on 8/29/19), elevated transaminases ( and , from 16 and 11 on 8/29/19), and lactic acidosis (5.8). Contrast CT showed acute enteritis, sigmoid and rectal constipation, and some peritoneal free fluid in the pericolic  magdy.she was given lactated ringers, cefepime, and metronidazole. She required norepinephrine for septic shock. She was admitted to [facility].    Overview/Hospital Course:  Blood cultures grew pan-sensitive Escherichia coli. Due to history of bowel resection for obstruction, General Surgery was consulted for her bowel obstruction. She was put on amikacin and cefepime. 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 She was kept intubated postoperatively. Hospital Medicine changed amikacin to metronidazole. She became oliguric but responded well to a dose of furosemide. She developed atrial fibrillation and sinus pauses, so Cardiology was consulted and placed a temporary pacemaker. Echocardiogram only showed concentric remodeling and mild mitral regurgitation. Norepinephrine was changed to dopamine. EEG showed triphasic waves bilaterally consistent with metabolic encephalopathy. The epileptologist recommended treating her hyponatremia and checking levels of her oxcarbazepine and phenobarbital.     Interval History: On dopamine. Temporary pacemaker on.    Review of Systems   Unable to perform ROS: Intubated     Objective:     Vital Signs (Most Recent):  Temp: 98.8 °F (37.1 °C) (10/31/19 1115)  Pulse: 86 (10/31/19 1345)  Resp: (!) 25 (10/31/19 1345)  BP: (!) 109/55 (10/31/19 1345)  SpO2: 98 % (10/31/19 1345) Vital Signs (24h Range):  Temp:  [97.7 °F (36.5 °C)-98.8 °F (37.1 °C)] 98.8 °F (37.1 °C)  Pulse:  [] 86  Resp:  [21-33] 25  SpO2:  [93 %-100 %] 98 %  BP: ()/(35-63) 109/55     Weight: 70.2 kg (154 lb 12.2 oz)  Body mass index is 30.23 kg/m².    Intake/Output Summary (Last 24 hours) at 10/31/2019 1416  Last data filed at 10/31/2019 1300  Gross per 24 hour   Intake 1107.89 ml   Output 3354 ml   Net -2246.11 ml      Physical Exam   Constitutional: She appears well-developed. She appears lethargic. No distress.  She is intubated.   Cardiovascular: Normal rate and regular rhythm.   Pulmonary/Chest: She is intubated.   Abdominal: Soft. There is no guarding.   Surgical dressing intact   Musculoskeletal: She exhibits edema. She exhibits no tenderness.   Neurological: She appears lethargic.   Nursing note and vitals reviewed.      Significant Labs: All pertinent labs within the past 24 hours have been reviewed.   Recent Labs   Lab 10/28/19  0046  10/30/19  1510 10/30/19  2102 10/31/19  0410 10/31/19  1110   *   < > 124* 124* 125* 124*   K 4.6   < > 4.3 3.7 4.1 4.0   CL 95   < > 92* 92* 92* 92*   CO2 21*   < > 22* 22* 24 23   BUN 27*   < > 55* 56* 62* 67*   CREATININE 1.6*   < > 2.4* 2.4* 2.5* 2.5*   CALCIUM 10.1   < > 7.1* 7.6* 7.6* 7.6*   PROT 6.1  --  3.9*  --  4.5*  --    BILITOT 1.1*  --  1.0  --  1.0  --    ALKPHOS 57  --  40*  --  45*  --    *  --  46*  --  39  --    *  --  61*  --  62*  --     < > = values in this interval not displayed.         Significant Imaging: I have reviewed all pertinent imaging results/findings within the past 24 hours.   TRANSTHORACIC ECHO (TTE) COMPLETE 10/30/19:  · Normal left ventricular systolic function. The estimated ejection fraction is 55%  · No wall motion abnormalities.  · Concentric left ventricular remodeling.  · Normal right ventricular systolic function.  · Mild mitral regurgitation.  · Diastolic pattern consistent with atrial fibrillation observed.  Electroencephalogram 10/31/19:  EEG FINDINGS  Background activity:   The background rhythm was characterized by sharply contoured delta-theta (2-5 Hz) activity   No posterior dominant rhythm seen.   Symmetry and continuity: the background was continuous and symmetric  Sleep:    Not seen.  Activation procedures:   Photic stimulation and hyperventilation were not performed  Responsive to verbal stimuli with reactivity noted on EEG.  Abnormal activity:   Abundant bilateral broad sharp waves of triphasic morphology are  seen in a periodic fashion, without evolution.  No electrographic seizures were seen.  Impression:  IMPRESSION:   This is an abnormal extended (1 hour and 4 minutes) EEG due to:  1) abundant bilateral periodic discharges seen of triphasic morphology, likely of metabolic etiology  2) severe generalized slowing seen, suggestive of severe diffuse or multifocal cerebral dysfunction.      Assessment/Plan:      * Colon necrosis  Fecal obstruction  E. coli bacteremia  E. coli septic shock  Atrial fibrillation  Acute tubular necrosis  Acute metabolic encephalopathy  Sinus pauses  Due to risk of lowering seizure threshold with cephalosporin, changed cefepime to ertapenem. Give another dose of furosemide for oliguria and hyponatremia. Status post sigmoidectomy. Still intubated with poor mentation. Appreciate General Surgery, Pulmonology, Cardiology.    Slow transit constipation  Takes polyethylene glycol at home.    Peripheral vascular disease, unspecified  Hyperlipidemia   Takes pravastatin.    Old MI (myocardial infarction)  Takes pravastatin.    Abnormal liver enzymes  See primary problem.    E. coli septic shock  See primary problem.    Depression  Continue home mirtazapine 7.5mg HS.    Anemia due to antineoplastic chemotherapy  Stable.    DLBCL (diffuse large B cell lymphoma)  Follow up outpatient.    Acquired hypothyroidism  Continue home levothyroxine 125 mcg before breakfast.    Epilepsy  Continue home oxycarbazepine 300 mg nightly, phenobarbital 64.8 mg nightly.    Renovascular hypertension  Hold home lisinopril, clonidine, carvedilol.      VTE Risk Mitigation (From admission, onward)         Ordered     IP VTE LOW RISK PATIENT  Once      10/30/19 0811     Place sequential compression device  Until discontinued      10/28/19 0607                Critical care time spent on the evaluation and treatment of severe organ dysfunction, review of pertinent labs and imaging studies, discussions with consulting providers and  discussions with patient/family: 40 minutes.      José Manuel Guidry MD  Department of Hospital Medicine   [facility]

## 2019-11-01 PROBLEM — D69.6 THROMBOCYTOPENIA: Status: ACTIVE | Noted: 2019-11-01

## 2019-11-01 PROBLEM — D64.9 ANEMIA OF ACUTE INFECTION: Status: ACTIVE | Noted: 2019-11-01

## 2019-11-01 PROBLEM — D65 DIC (DISSEMINATED INTRAVASCULAR COAGULATION): Status: ACTIVE | Noted: 2019-10-29

## 2019-11-01 LAB
ABO GROUP BLD: NORMAL
ALBUMIN SERPL BCP-MCNC: 2.1 G/DL (ref 3.5–5.2)
ALP SERPL-CCNC: 53 U/L (ref 55–135)
ALT SERPL W/O P-5'-P-CCNC: 29 U/L (ref 10–44)
ANION GAP SERPL CALC-SCNC: 10 MMOL/L (ref 8–16)
ANION GAP SERPL CALC-SCNC: 9 MMOL/L (ref 8–16)
ANION GAP SERPL CALC-SCNC: 9 MMOL/L (ref 8–16)
ANISOCYTOSIS BLD QL SMEAR: SLIGHT
AST SERPL-CCNC: 59 U/L (ref 10–40)
BASOPHILS # BLD AUTO: ABNORMAL K/UL (ref 0–0.2)
BASOPHILS NFR BLD: 1 % (ref 0–1.9)
BILIRUB SERPL-MCNC: 1 MG/DL (ref 0.1–1)
BLD GP AB SCN CELLS X3 SERPL QL: NORMAL
BLD PROD TYP BPU: NORMAL
BLOOD UNIT EXPIRATION DATE: NORMAL
BLOOD UNIT TYPE CODE: 5100
BLOOD UNIT TYPE: NORMAL
BUN SERPL-MCNC: 83 MG/DL (ref 8–23)
BUN SERPL-MCNC: 83 MG/DL (ref 8–23)
BUN SERPL-MCNC: 96 MG/DL (ref 8–23)
CALCIUM SERPL-MCNC: 7.9 MG/DL (ref 8.7–10.5)
CALCIUM SERPL-MCNC: 7.9 MG/DL (ref 8.7–10.5)
CALCIUM SERPL-MCNC: 8 MG/DL (ref 8.7–10.5)
CHLORIDE SERPL-SCNC: 90 MMOL/L (ref 95–110)
CO2 SERPL-SCNC: 22 MMOL/L (ref 23–29)
CO2 SERPL-SCNC: 23 MMOL/L (ref 23–29)
CO2 SERPL-SCNC: 23 MMOL/L (ref 23–29)
CODING SYSTEM: NORMAL
CREAT SERPL-MCNC: 3.1 MG/DL (ref 0.5–1.4)
DIFFERENTIAL METHOD: ABNORMAL
DISPENSE STATUS: NORMAL
EOSINOPHIL # BLD AUTO: ABNORMAL K/UL (ref 0–0.5)
EOSINOPHIL NFR BLD: 1 % (ref 0–8)
ERYTHROCYTE [DISTWIDTH] IN BLOOD BY AUTOMATED COUNT: 14.2 % (ref 11.5–14.5)
EST. GFR  (AFRICAN AMERICAN): 15 ML/MIN/1.73 M^2
EST. GFR  (NON AFRICAN AMERICAN): 13 ML/MIN/1.73 M^2
GLUCOSE SERPL-MCNC: 168 MG/DL (ref 70–110)
GLUCOSE SERPL-MCNC: 168 MG/DL (ref 70–110)
GLUCOSE SERPL-MCNC: 97 MG/DL (ref 70–110)
HCT VFR BLD AUTO: 19.6 % (ref 37–48.5)
HGB BLD-MCNC: 6.9 G/DL (ref 12–16)
LYMPHOCYTES # BLD AUTO: ABNORMAL K/UL (ref 1–4.8)
LYMPHOCYTES NFR BLD: 10 % (ref 18–48)
MAGNESIUM SERPL-MCNC: 3.6 MG/DL (ref 1.6–2.6)
MCH RBC QN AUTO: 33.3 PG (ref 27–31)
MCHC RBC AUTO-ENTMCNC: 35.2 G/DL (ref 32–36)
MCV RBC AUTO: 95 FL (ref 82–98)
MONOCYTES # BLD AUTO: ABNORMAL K/UL (ref 0.3–1)
MONOCYTES NFR BLD: 2 % (ref 4–15)
NEUTROPHILS # BLD AUTO: ABNORMAL K/UL (ref 1.8–7.7)
NEUTROPHILS NFR BLD: 71 % (ref 38–73)
NEUTS BAND NFR BLD MANUAL: 15 %
OSMOLALITY UR: 278 MOSM/KG (ref 50–1200)
PHOSPHATE SERPL-MCNC: 3.9 MG/DL (ref 2.7–4.5)
PLATELET # BLD AUTO: 45 K/UL (ref 150–350)
PLATELET BLD QL SMEAR: ABNORMAL
PMV BLD AUTO: 10.9 FL (ref 9.2–12.9)
POTASSIUM SERPL-SCNC: 4.8 MMOL/L (ref 3.5–5.1)
POTASSIUM SERPL-SCNC: 4.8 MMOL/L (ref 3.5–5.1)
POTASSIUM SERPL-SCNC: 4.9 MMOL/L (ref 3.5–5.1)
PROT SERPL-MCNC: 4.7 G/DL (ref 6–8.4)
RBC # BLD AUTO: 2.07 M/UL (ref 4–5.4)
RH BLD: NORMAL
SODIUM SERPL-SCNC: 122 MMOL/L (ref 136–145)
SODIUM UR-SCNC: 56 MMOL/L (ref 20–250)
TRANS ERYTHROCYTES VOL PATIENT: NORMAL ML
TSH SERPL DL<=0.005 MIU/L-ACNC: 0.51 UIU/ML (ref 0.4–4)
VANCOMYCIN SERPL-MCNC: 25.8 UG/ML
WBC # BLD AUTO: 6.09 K/UL (ref 3.9–12.7)

## 2019-11-01 PROCEDURE — 90945 DIALYSIS ONE EVALUATION: CPT

## 2019-11-01 PROCEDURE — 85027 COMPLETE CBC AUTOMATED: CPT

## 2019-11-01 PROCEDURE — 36556 PR INSERT NON-TUNNEL CV CATH 5+ YRS OLD: ICD-10-PCS | Mod: 58,RT,, | Performed by: STUDENT IN AN ORGANIZED HEALTH CARE EDUCATION/TRAINING PROGRAM

## 2019-11-01 PROCEDURE — 25000003 PHARM REV CODE 250: Performed by: NURSE PRACTITIONER

## 2019-11-01 PROCEDURE — P9021 RED BLOOD CELLS UNIT: HCPCS

## 2019-11-01 PROCEDURE — 63600175 PHARM REV CODE 636 W HCPCS: Performed by: INTERNAL MEDICINE

## 2019-11-01 PROCEDURE — 86850 RBC ANTIBODY SCREEN: CPT

## 2019-11-01 PROCEDURE — 99900026 HC AIRWAY MAINTENANCE (STAT)

## 2019-11-01 PROCEDURE — 94003 VENT MGMT INPAT SUBQ DAY: CPT

## 2019-11-01 PROCEDURE — 36415 COLL VENOUS BLD VENIPUNCTURE: CPT

## 2019-11-01 PROCEDURE — 99900035 HC TECH TIME PER 15 MIN (STAT)

## 2019-11-01 PROCEDURE — 25000003 PHARM REV CODE 250: Performed by: EMERGENCY MEDICINE

## 2019-11-01 PROCEDURE — 99291 CRITICAL CARE FIRST HOUR: CPT | Mod: ,,, | Performed by: INTERNAL MEDICINE

## 2019-11-01 PROCEDURE — 25000003 PHARM REV CODE 250: Performed by: HOSPITALIST

## 2019-11-01 PROCEDURE — 84100 ASSAY OF PHOSPHORUS: CPT

## 2019-11-01 PROCEDURE — A4217 STERILE WATER/SALINE, 500 ML: HCPCS | Performed by: HOSPITALIST

## 2019-11-01 PROCEDURE — 97165 OT EVAL LOW COMPLEX 30 MIN: CPT

## 2019-11-01 PROCEDURE — 63600175 PHARM REV CODE 636 W HCPCS: Performed by: HOSPITALIST

## 2019-11-01 PROCEDURE — 95822 EEG COMA OR SLEEP ONLY: CPT

## 2019-11-01 PROCEDURE — 25000003 PHARM REV CODE 250: Performed by: INTERNAL MEDICINE

## 2019-11-01 PROCEDURE — 36430 TRANSFUSION BLD/BLD COMPNT: CPT

## 2019-11-01 PROCEDURE — 20000000 HC ICU ROOM

## 2019-11-01 PROCEDURE — 99291 PR CRITICAL CARE, E/M 30-74 MINUTES: ICD-10-PCS | Mod: ,,, | Performed by: INTERNAL MEDICINE

## 2019-11-01 PROCEDURE — 80202 ASSAY OF VANCOMYCIN: CPT

## 2019-11-01 PROCEDURE — 83935 ASSAY OF URINE OSMOLALITY: CPT

## 2019-11-01 PROCEDURE — 99223 PR INITIAL HOSPITAL CARE,LEVL III: ICD-10-PCS | Mod: ,,, | Performed by: INTERNAL MEDICINE

## 2019-11-01 PROCEDURE — 99223 1ST HOSP IP/OBS HIGH 75: CPT | Mod: ,,, | Performed by: INTERNAL MEDICINE

## 2019-11-01 PROCEDURE — 87040 BLOOD CULTURE FOR BACTERIA: CPT

## 2019-11-01 PROCEDURE — 85007 BL SMEAR W/DIFF WBC COUNT: CPT

## 2019-11-01 PROCEDURE — 84300 ASSAY OF URINE SODIUM: CPT

## 2019-11-01 PROCEDURE — 86901 BLOOD TYPING SEROLOGIC RH(D): CPT

## 2019-11-01 PROCEDURE — 36556 INSERT NON-TUNNEL CV CATH: CPT | Mod: 58,RT,, | Performed by: STUDENT IN AN ORGANIZED HEALTH CARE EDUCATION/TRAINING PROGRAM

## 2019-11-01 PROCEDURE — S0171 BUMETANIDE 0.5 MG: HCPCS | Performed by: INTERNAL MEDICINE

## 2019-11-01 PROCEDURE — S0028 INJECTION, FAMOTIDINE, 20 MG: HCPCS | Performed by: HOSPITALIST

## 2019-11-01 PROCEDURE — 83735 ASSAY OF MAGNESIUM: CPT

## 2019-11-01 PROCEDURE — 86920 COMPATIBILITY TEST SPIN: CPT

## 2019-11-01 PROCEDURE — 97110 THERAPEUTIC EXERCISES: CPT

## 2019-11-01 PROCEDURE — 84443 ASSAY THYROID STIM HORMONE: CPT

## 2019-11-01 PROCEDURE — 80053 COMPREHEN METABOLIC PANEL: CPT

## 2019-11-01 PROCEDURE — 80048 BASIC METABOLIC PNL TOTAL CA: CPT

## 2019-11-01 RX ORDER — HYDROCODONE BITARTRATE AND ACETAMINOPHEN 500; 5 MG/1; MG/1
TABLET ORAL
Status: DISCONTINUED | OUTPATIENT
Start: 2019-11-01 | End: 2019-11-06

## 2019-11-01 RX ORDER — MAGNESIUM SULFATE HEPTAHYDRATE 40 MG/ML
2 INJECTION, SOLUTION INTRAVENOUS
Status: ACTIVE | OUTPATIENT
Start: 2019-11-01 | End: 2019-11-02

## 2019-11-01 RX ORDER — BUMETANIDE 0.25 MG/ML
2 INJECTION INTRAMUSCULAR; INTRAVENOUS ONCE
Status: DISCONTINUED | OUTPATIENT
Start: 2019-11-01 | End: 2019-11-01

## 2019-11-01 RX ORDER — HYDROCODONE BITARTRATE AND ACETAMINOPHEN 500; 5 MG/1; MG/1
TABLET ORAL CONTINUOUS
Status: ACTIVE | OUTPATIENT
Start: 2019-11-01 | End: 2019-11-02

## 2019-11-01 RX ORDER — FENTANYL CITRATE 50 UG/ML
25 INJECTION, SOLUTION INTRAMUSCULAR; INTRAVENOUS
Status: DISCONTINUED | OUTPATIENT
Start: 2019-11-01 | End: 2019-11-02

## 2019-11-01 RX ORDER — SODIUM CHLORIDE 9 MG/ML
INJECTION, SOLUTION INTRAVENOUS CONTINUOUS
Status: DISCONTINUED | OUTPATIENT
Start: 2019-11-01 | End: 2019-11-01

## 2019-11-01 RX ORDER — BUMETANIDE 0.25 MG/ML
2 INJECTION INTRAMUSCULAR; INTRAVENOUS DAILY
Status: DISCONTINUED | OUTPATIENT
Start: 2019-11-01 | End: 2019-11-02

## 2019-11-01 RX ORDER — BUMETANIDE 0.25 MG/ML
2 INJECTION INTRAMUSCULAR; INTRAVENOUS ONCE
Status: COMPLETED | OUTPATIENT
Start: 2019-11-01 | End: 2019-11-01

## 2019-11-01 RX ADMIN — BUMETANIDE 1 MG: 0.25 INJECTION INTRAMUSCULAR; INTRAVENOUS at 06:11

## 2019-11-01 RX ADMIN — LEVOTHYROXINE SODIUM 125 MCG: 25 TABLET ORAL at 05:11

## 2019-11-01 RX ADMIN — OXCARBAZEPINE 300 MG: 150 TABLET, FILM COATED ORAL at 08:11

## 2019-11-01 RX ADMIN — EPOETIN ALFA-EPBX 20000 UNITS: 10000 INJECTION, SOLUTION INTRAVENOUS; SUBCUTANEOUS at 10:11

## 2019-11-01 RX ADMIN — PRAVASTATIN SODIUM 20 MG: 20 TABLET ORAL at 08:11

## 2019-11-01 RX ADMIN — SODIUM CHLORIDE: 0.9 INJECTION, SOLUTION INTRAVENOUS at 10:11

## 2019-11-01 RX ADMIN — ERTAPENEM SODIUM 0.5 G: 1 INJECTION, POWDER, LYOPHILIZED, FOR SOLUTION INTRAMUSCULAR; INTRAVENOUS at 09:11

## 2019-11-01 RX ADMIN — BUMETANIDE 2 MG: 0.25 INJECTION INTRAMUSCULAR; INTRAVENOUS at 11:11

## 2019-11-01 RX ADMIN — CALCIUM GLUCONATE: 98 INJECTION, SOLUTION INTRAVENOUS at 05:11

## 2019-11-01 RX ADMIN — BUMETANIDE 1 MG/HR: 0.25 INJECTION INTRAMUSCULAR; INTRAVENOUS at 11:11

## 2019-11-01 RX ADMIN — PHENOBARBITAL 64.8 MG: 32.4 TABLET ORAL at 08:11

## 2019-11-01 RX ADMIN — FAMOTIDINE 20 MG: 10 INJECTION, SOLUTION INTRAVENOUS at 08:11

## 2019-11-01 RX ADMIN — BUMETANIDE 2 MG: 0.25 INJECTION INTRAMUSCULAR; INTRAVENOUS at 08:11

## 2019-11-01 NOTE — ASSESSMENT & PLAN NOTE
-sinus pause with no escape rhythm; longest episode approximately 11 seconds on 10/30/2019  -[place] WNL  -no recent use of BB or CCB  -no obvious reversible causes; no obvious non cardiac medications as cause of pause-reviewed by pharmacy  -initially TCP at HR of 100; TVP placed with HR 80 and MA 10; will reduce HR to 70; remains on IV Dopamine for BP support; unable to wean due to recurrent hypotension   -will continue to follow and continue TVP for now

## 2019-11-01 NOTE — ASSESSMENT & PLAN NOTE
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     -continue abx   -continue NG tube decompression   -monitor colostomy output  -given possible prolonged post op course, continue TPN.   -once NG output slows, can consider NG tube feeds. Output still high.   -wean to extubate as tolerated   -consider HIT panel for drop in plt last few days from initial  -remaining care per medical team

## 2019-11-01 NOTE — PROCEDURES
"Annette Garcia is a 84 y.o. female patient.    Temp: 98.4 °F (36.9 °C) (11/01/19 1535)  Pulse: 79 (11/01/19 1700)  Resp: 20 (11/01/19 1700)  BP: 127/60 (11/01/19 1700)  SpO2: 100 % (11/01/19 1700)  Weight: 70.2 kg (154 lb 12.2 oz) (10/31/19 1000)  Height: 5' (152.4 cm) (10/31/19 1000)  Mallampati Scale: Class III  ASA Classification: Class 3    Central Line  Date/Time: 11/1/2019 5:15 PM  Performed by: Chris Johnson MD  Consent Done: Yes  Time out: Immediately prior to procedure a "time out" was called to verify the correct patient, procedure, equipment, support staff and site/side marked as required.  Indications: hemodialysis  Anesthesia: local infiltration    Anesthesia:  Local anesthesia used: yes  Local Anesthetic: lidocaine 1% without epinephrine  Preparation: skin prepped with ChloraPrep  Skin prep agent dried: skin prep agent completely dried prior to procedure  Sterile barriers: all five maximum sterile barriers used - cap, mask, sterile gown, sterile gloves, and large sterile sheet  Hand hygiene: hand hygiene performed prior to central venous catheter insertion  Location details: right internal jugular  Catheter type: triple lumen  Catheter size: 12 Fr  Catheter Length: 15cm    Number of attempts: 1  Technical procedures used: Exchanged over previous right IJ central line  Complications: none  Specimens: No  Implants: No  Post-procedure: chlorhexidine patch and line sutured  Complications: No          Chris Johnson  11/1/2019    "

## 2019-11-01 NOTE — EICU
Called to pt's room via e-lert to perform time-out and documentation of trialysis line placement. See bedside procedure flow sheet.     Time spent in room [id number]

## 2019-11-01 NOTE — PLAN OF CARE
Problem: Physical Therapy Goal  Goal: Physical Therapy Goal  Description  Goals to be met by:      Patient will increase functional independence with mobility by performin. Supine to sit with Moderate Assistance  2. Sit to supine with Moderate Assistance  3. Rolling to Left and Right with Moderate Assistance.  4. Sit to stand transfer with Moderate Assistance using RW  5. Bed to chair transfer with Minimal Assistance and Moderate Assistance using Rolling Walker  6. Gait  x 25 feet with Minimal Assistance and Moderate Assistance using Rolling Walker.   7. Lower extremity exercise program x10 with active BLE with assistance as needed     Outcome: Ongoing   Patient able to open eyes with repeated auditory and tactile stimulation; able to move all extremities to a limited degree spontaneously but not upon command; will cont with POC as tolerated.

## 2019-11-01 NOTE — ASSESSMENT & PLAN NOTE
- I have reviewed the peripheral smear. There is a rare schistocyte per high-power field. Rouleaux formation is seen, suggestive of acute conditions/infection/hospitalization as the cause of her anemia.  - iron studies (10/31/19) revealed a decreased total iron binding capacity. This is consistent with anemia of acute infection/inflammation/hospitalization.  - continue supportive care.

## 2019-11-01 NOTE — PT/OT/SLP RE-EVAL
Physical Therapy Re-evaluation/Treatment    Patient Name:  Annette Garcia   MRN:  [id number]    Recommendations:     Discharge Recommendations:  (TBD)   Discharge Equipment Recommendations: (TBD)   Barriers to discharge: onging assessment    Assessment:     Annette Garcia is a 84 y.o. female admitted with a medical diagnosis of Colon necrosis.  She presents with the following impairments/functional limitations:  weakness, impaired endurance, gait instability, impaired functional mobilty, impaired self care skills, impaired balance, decreased coordination, decreased ROM, impaired skin, edema, impaired fine motor, impaired cardiopulmonary response to activity, decreased lower extremity function, decreased upper extremity function, impaired cognition, impaired coordination Patient able to open eyes with repeated auditory and tactile stimulation; able to move all extremities to a limited degree spontaneously but not upon command; will cont with POC as tolerated..    Rehab Prognosis:  Poor; patient would benefit from acute skilled PT services to address these deficits and reach maximum level of function.      Recent Surgery: Procedure(s) (LRB):  Insertion, Pacemaker, Temporary Transvenous (Right) 2 Days Post-Op    Plan:     During this hospitalization, patient to be seen 3-5 x/week to address the above listed problems via gait training, therapeutic activities, therapeutic exercises, neuromuscular re-education(when appropriate)  · Plan of Care Expires:  12/01/19   Plan of Care Reviewed with: patient, family    Subjective     Communicated with nurse prior to session.  Patient found with peripheral IV, telemetry, blood pressure cuff, central line, gonzales catheter, pressure relief boots, ventilator(TVP) upon PT entry to room, agreeable to evaluation.      Chief Complaint: intubated, non-verbal  Patient comments/goals: not obtained  Pain/Comfort:  ·  no s/s of pain    Patients cultural, spiritual, Mu-ism conflicts  given the current situation: no      Objective:     Patient found with: peripheral IV, telemetry, blood pressure cuff, central line, gonzales catheter, pressure relief boots, ventilator(TVP)     General Precautions: Standard, fall, aspiration, NPO   Orthopedic Precautions:N/A   Braces:       Exams:  · Cognition: opened eyes to repeated auditory and tactile stimulation  · ROM: pt able to move all extremities to a limited degree mainly distally but not upon command; no purposeful movement; pt gave intermittent resistance to PROM BLEs    Functional Mobility:  · NT~dependent    AM-PAC 6 CLICK MOBILITY  Total Score:  6      Therapeutic Activities and Exercises:   Limited eval; minimal eye opening to auditory and tactile stimulation; PROM to all extremities x 10-12 reps    Patient left HOB elevated with all lines intact, call button in reach and nurse notified.    GOALS:   Multidisciplinary Problems     Physical Therapy Goals        Problem: Physical Therapy Goal    Goal Priority Disciplines Outcome Goal Variances Interventions   Physical Therapy Goal     PT, PT/OT Ongoing, Progressing     Description:  1.  Roll bilaterally with supervision  2.  Supine to/from sit with supervision  3.  Bed to/from chair with CG  4.  Ambulate 50' with RW with CG  5.  Sit in chair 2 hours           Problem: Physical Therapy Goal    Goal Priority Disciplines Outcome Goal Variances Interventions   Physical Therapy Goal     PT, PT/OT Ongoing, Progressing     Description:  Goals to be met by:      Patient will increase functional independence with mobility by performin. Supine to sit with Moderate Assistance  2. Sit to supine with Moderate Assistance  3. Rolling to Left and Right with Moderate Assistance.  4. Sit to stand transfer with Moderate Assistance using RW  5. Bed to chair transfer with Minimal Assistance and Moderate Assistance using Rolling Walker  6. Gait  x 25 feet with Minimal Assistance and Moderate Assistance using  Rolling Walker.   7. Lower extremity exercise program x10 with active BLE with assistance as needed                      History:     Past Medical History:   Diagnosis Date    Cancer     colon    Hypertension     Petit mal epilepsy 1954    Scoliosis of lumbar spine     Seizures     Unspecified hypothyroidism        Past Surgical History:   Procedure Laterality Date    APPENDECTOMY      BACK SURGERY  1988    vertebral fracture    BACK SURGERY  02/2013    lumbar L2-5    CATARACT EXTRACTION, BILATERAL Bilateral     CHOLECYSTECTOMY      open    EYE SURGERY Bilateral     cataract removal with lens implant    HYSTERECTOMY      PORTACATH PLACEMENT Right 09/2016    RENAL ARTERY STENT Left 07/19/2017    SIGMOIDECTOMY  10/29/2019    SMALL INTESTINE SURGERY  08/23/2016    TONSILLECTOMY      VAGINAL HYSTERECTOMY W/ ANTERIOR AND POSTERIOR VAGINAL REPAIR         Time Tracking:     PT Received On: 11/01/19  PT Start Time: 1515     PT Stop Time: 1539  PT Total Time (min): 24 min     Billable Minutes:Re-Evaluation 10 minutes and Therapeutic Exercise 14 minutes      Kinga Velásquez, PT  10/31/2019

## 2019-11-01 NOTE — SUBJECTIVE & OBJECTIVE
Review of Systems   Unable to perform ROS: intubated     Objective:     Vital Signs (Most Recent):  Temp: 98.9 °F (37.2 °C) (11/01/19 0730)  Pulse: 82 (11/01/19 0915)  Resp: 20 (11/01/19 0915)  BP: 139/63 (11/01/19 0915)  SpO2: 100 % (11/01/19 0915) Vital Signs (24h Range):  Temp:  [98.8 °F (37.1 °C)-99.8 °F (37.7 °C)] 98.9 °F (37.2 °C)  Pulse:  [] 82  Resp:  [20-41] 20  SpO2:  [93 %-100 %] 100 %  BP: ()/(46-85) 139/63     Weight: 70.2 kg (154 lb 12.2 oz)  Body mass index is 30.23 kg/m².     SpO2: 100 %  O2 Device (Oxygen Therapy): ventilator      Intake/Output Summary (Last 24 hours) at 11/1/2019 1114  Last data filed at 11/1/2019 1100  Gross per 24 hour   Intake 3108.88 ml   Output 2305 ml   Net 803.88 ml       Lines/Drains/Airways     Central Venous Catheter Line                 Port A Cath Single Lumen right subclavian -- days         Percutaneous Central Line Insertion/Assessment - triple lumen  10/28/19 0420 4 days          Drain                 Urethral Catheter 10/28/19  Non-latex 16 Fr. 4 days         NG/OG Tube 10/28/19 1630 McKenzie sump Left nostril 3 days         Colostomy 10/29/19 1200 LLQ 2 days          Airway                 Airway - Non-Surgical 10/29/19 1015 Endotracheal Tube 3 days          Peripheral Intravenous Line                 Peripheral IV - Single Lumen 10/28/19 0227 20 G Anterior Forearm 4 days                Physical Exam   Constitutional: She has a sickly appearance. She appears ill. She is intubated.   Cardiovascular: Normal rate and regular rhythm.   Pulmonary/Chest: Effort normal. She is intubated. She has decreased breath sounds.       Significant Labs:     Recent Labs   Lab 11/01/19 0441   WBC 6.09   RBC 2.07*   HGB 6.9*   HCT 19.6*   PLT 45*   MCV 95   MCH 33.3*   MCHC 35.2     Recent Labs   Lab 11/01/19 0441   *  122*   K 4.8  4.8   CL 90*  90*   CO2 23  23   BUN 83*  83*   CREATININE 3.1*  3.1*   MG 3.6*       Significant Imaging: Echocardiogram:    Transthoracic echo (TTE) complete (Cupid Only):   Results for orders placed or performed during the hospital encounter of 10/27/19   Echo Color Flow Doppler? Yes   Result Value Ref Range    BSA 1.73 m2    Ascending aorta 2.61 cm    AV mean gradient 3 mmHg    Ao peak gennaro 1.23 m/s    Ao VTI 17.79 cm    IVS 1.07 0.6 - 1.1 cm    LA size 3.21 cm    Left Atrium Major Axis 5.12 cm    Left Atrium Minor Axis 4.83 cm    LVIDD 4.17 3.5 - 6.0 cm    LVIDS 2.93 2.1 - 4.0 cm    LVOT diameter 1.96 cm    LVOT peak VTI 14.74 cm    PW 0.98 0.6 - 1.1 cm    PV Peak D Gennaro 0.24 m/s    PV Peak S Gennaro 0.29 m/s    RA Major Axis 3.96 cm    RA Width 2.15 cm    RVDD 1.57 cm    TR Max Gennaro 2.06 m/s    LA WIDTH 2.65 cm    Ao root annulus 3.38 cm    AORTIC VALVE CUSP SEPERATION 1.48 cm    LV Diastolic Volume 77.15 mL    LV Systolic Volume 33.07 mL    LVOT peak gennaro 0.80 m/s    FS 30 %    LA volume 35.94 cm3    LV mass 140.48 g    Left Ventricle Relative Wall Thickness 0.47 cm    AV valve area 2.50 cm2    AV Velocity Ratio 0.65     AV index (prosthetic) 0.83     Pulm vein S/D ratio 1.21     LVOT area 3.0 cm2    LVOT stroke volume 44.45 cm3    AV peak gradient 6 mmHg    LV Systolic Volume Index 19.7 mL/m2    LV Diastolic Volume Index 45.94 mL/m2    LA Volume Index 21.4 mL/m2    LV Mass Index 84 g/m2    Triscuspid Valve Regurgitation Peak Gradient 17 mmHg    TDI SEPTAL 0.09 m/s    TDI LATERAL 0.16 m/s    Mean e' 0.13 m/s    Narrative    · Normal left ventricular systolic function. The estimated ejection   fraction is 55%  · No wall motion abnormalities.  · Concentric left ventricular remodeling.  · Normal right ventricular systolic function.  · Mild mitral regurgitation.  · Diastolic pattern consistent with atrial fibrillation observed.

## 2019-11-01 NOTE — ASSESSMENT & PLAN NOTE
- I have reviewed the peripheral smear. Platelets are decreased. There is no platelet clumping.  - I suspect her thrombocytopenia is from surgery/acute conditions/infection.  - it appears she only received minimal heparin (a few flushes). Suspicion for heparin-induced thrombocytopenia is low, given other more likely competing causes of thrombocytopenia.  - she is receiving multiple antibiotics that can cause drug-induced thrombocytopenia. Consider narrowing/switching antibiotics if platelet count continues to decrease.

## 2019-11-01 NOTE — ASSESSMENT & PLAN NOTE
- patient of Dr. Arredondo. She completed 6 cycles of R-CHOP; last dose in January 2017.  - pathology from surgery (10/29/19) was negative for lymphoma  - no signs of recurrent disease

## 2019-11-01 NOTE — PROGRESS NOTES
[facility]  General Surgery  Progress Note    Subjective:     History of Present Illness:  No notes on file    Post-Op Info:  Procedure(s) (LRB):  Insertion, Pacemaker, Temporary Transvenous (Right)   2 Days Post-Op     Interval History: Remains intubated. RSBI 50-70 on minimal vent settings. Able to open eyes to voice and move hands to voice. No ostomy output. Hemoglobin slightly downtrending. Continues to make urine.     Medications:  Continuous Infusions:   sodium chloride 0.9% 100 mL/hr at 11/01/19 1021    Amino acid 5% - dextrose 15% (CLINIMIX-E) solution with additives (1L  provides 510 kcal/L dextrose, with 50 gm AA, 150 gm CHO, Na 35, K 30, Mg 5, Ca 4.5, Acetate 80, Cl 39, Phos 15) 75 mL/hr at 10/31/19 1747    bumetanide (BUMEX)0.25 mg/mL infusion      DOPamine 5 mcg/kg/min (11/01/19 0820)    norepinephrine bitartrate-D5W      TPN ADULT CENTRAL LINE CUSTOM       Scheduled Meds:   bumetanide  2 mg Intravenous Daily    ertapenem (INVANZ) IVPB  500 mg Intravenous Daily    famotidine (PF)  20 mg Intravenous Daily    levothyroxine  125 mcg Oral Before breakfast    OXcarbazepine  300 mg Oral QHS    PHENobarbital  64.8 mg Oral Nightly    pravastatin  20 mg Oral Daily     PRN Meds:sodium chloride, acetaminophen, albuterol-ipratropium, bumetanide, morphine, ondansetron, promethazine (PHENERGAN) IVPB, ramelteon, sodium chloride 0.9%     Review of patient's allergies indicates:   Allergen Reactions    Adhesive Itching and Blisters    Penicillins Anaphylaxis    Tramadol Hives    Avelox [moxifloxacin] Rash     Facial and arm itching and redness. Pt states throat closes when given.    Amoxil [amoxicillin]     Aspridrox [aspirin, buffered]     Codeine Other (See Comments)     Throat swelling    Keflex [cephalexin]      Tolerated cefepime    Norvasc [amlodipine]     Red dye Hives    Robitussin [guaifenesin]     Sulfa (sulfonamide antibiotics)     Tylenol [acetaminophen]      Has  reaction to Tylenol with red dye and unable to take Extra Strength Tylenol/ CAN ONLY TOLERATE REG STRENGTH TYLENOL    Vicks vaporub [camphor-eucalyptus oil-menthol]      Objective:     Vital Signs (Most Recent):  Temp: 98.9 °F (37.2 °C) (11/01/19 0730)  Pulse: 82 (11/01/19 0915)  Resp: 20 (11/01/19 0915)  BP: 139/63 (11/01/19 0915)  SpO2: 100 % (11/01/19 0915) Vital Signs (24h Range):  Temp:  [98.8 °F (37.1 °C)-99.8 °F (37.7 °C)] 98.9 °F (37.2 °C)  Pulse:  [] 82  Resp:  [20-41] 20  SpO2:  [93 %-100 %] 100 %  BP: ()/(46-85) 139/63     Weight: 70.2 kg (154 lb 12.2 oz)  Body mass index is 30.23 kg/m².    Intake/Output - Last 3 Shifts       10/30 0700 - 10/31 0659 10/31 0700 - 11/01 0659 11/01 0700 - 11/02 0659    P.O.  0     I.V. (mL/kg) 966.2 (13.8) 568.9 (8.1)     NG/GT  90     IV Piggyback 450 550 100    .5 1800     Total Intake(mL/kg) 1783.7 (25.4) 3008.9 (42.9) 100 (1.4)    Urine (mL/kg/hr) 2299 (1.4) 1525 (0.9) 230 (0.8)    Drains 1400 800     Stool 10 0     Total Output 3709 2325 230    Net -1925.4 +683.9 -130           Stool Occurrence  2 x           Physical Exam   Constitutional: She appears well-developed. She appears lethargic. No distress. She is intubated.   Cardiovascular: Normal rate and regular rhythm.   Pulmonary/Chest: She is intubated.   Abdominal: Soft. There is no guarding.   Surgical dressing intact   Musculoskeletal: She exhibits edema. She exhibits no tenderness.   Neurological: She appears lethargic.   Nursing note and vitals reviewed.      Significant Labs:  CBC:   Recent Labs   Lab 11/01/19  0441   WBC 6.09   RBC 2.07*   HGB 6.9*   HCT 19.6*   PLT 45*   MCV 95   MCH 33.3*   MCHC 35.2     CMP:   Recent Labs   Lab 11/01/19 0441   *  168*   CALCIUM 7.9*  7.9*   ALBUMIN 2.1*   PROT 4.7*   *  122*   K 4.8  4.8   CO2 23  23   CL 90*  90*   BUN 83*  83*   CREATININE 3.1*  3.1*   ALKPHOS 53*   ALT 29   AST 59*   BILITOT 1.0         Significant  Diagnostics:  I have reviewed all pertinent imaging results/findings within the past 24 hours.    Assessment/Plan:     Abdominal pain  84-year-old female s/p sigmoid colectomy with end colostomy 10/30/19.     -continue abx   -continue NG tube decompression   -monitor colostomy output  -given possible prolonged post op course, continue TPN.   -once NG output slows, can consider NG tube feeds. Output still high.   -wean to extubate as tolerated   -consider HIT panel for drop in plt last few days from initial  -remaining care per medical team          Charles Dumont MD  General Surgery  [facility]

## 2019-11-01 NOTE — CONSULTS
[facility]  Infectious Disease  History and Physical    Admitting Team: Internal Medicine  Attending Physician: Dr. Guidry  Date of Admit: 10/27/2019    Subjective:      History of Present Illness:  Annette Garcia is an 84 year old white woman with peripheral vascular disease, renovascular hypertension, renal artery stenosis status post left renal artery stent placement on 7/19/17, coronary artery disease with history of myocardial infarction, diffuse large B cell lymphoma, anemia, epilepsy, hypothyroidism, chronic hyponatremia, depression, lumbar and sacroiliac joint osteoarthritis with chronic low back pain and history of lumbar spine surgery in 2013, slow transit constipation, history of appendectomy, history of cholecystectomy history hysterectomy, history of small bowel obstruction status post small bowel resection on 8/23/16.  She presented to the hospital with abdominal pain, difficulty urinating and constipation x 1 day.  In the ED, she was found to be in septic shock and required pressors.  CT performed there showed constipation and peritoneal free fluid.  Blood cultures growing pan-sensitive E. Coli.  Patient was taken to the OR on 10/29 and underwent sigmoid colectomy with end colostomy for findings of sigmoid necrosis and large amounts of turbid fluid in abdomen.  Patient was kept intubated following surgery.  She was also found to by hyponatremic with altered mental status.  Patient remains intubated and on pressor support in ICU.  History is obtained by chart review.  She is currently being treated with ertapenem.      Past Medical History:  Past Medical History:   Diagnosis Date    Cancer     colon    Hypertension     Petit mal epilepsy 1954    Scoliosis of lumbar spine     Seizures     Unspecified hypothyroidism      Past Surgical History:  Past Surgical History:   Procedure Laterality Date    APPENDECTOMY      BACK SURGERY  1988    vertebral fracture    BACK SURGERY   02/2013    lumbar L2-5    CATARACT EXTRACTION, BILATERAL Bilateral     CHOLECYSTECTOMY      open    EYE SURGERY Bilateral     cataract removal with lens implant    HYSTERECTOMY      PORTACATH PLACEMENT Right 09/2016    RENAL ARTERY STENT Left 07/19/2017    SIGMOIDECTOMY  10/29/2019    SMALL INTESTINE SURGERY  08/23/2016    TONSILLECTOMY      VAGINAL HYSTERECTOMY W/ ANTERIOR AND POSTERIOR VAGINAL REPAIR         Allergies:  Review of patient's allergies indicates:   Allergen Reactions    Adhesive Itching and Blisters    Penicillins Anaphylaxis    Tramadol Hives    Avelox [moxifloxacin] Rash     Facial and arm itching and redness. Pt states throat closes when given.    Amoxil [amoxicillin]     Aspridrox [aspirin, buffered]     Codeine Other (See Comments)     Throat swelling    Keflex [cephalexin]      Tolerated cefepime    Norvasc [amlodipine]     Red dye Hives    Robitussin [guaifenesin]     Sulfa (sulfonamide antibiotics)     Tylenol [acetaminophen]      Has reaction to Tylenol with red dye and unable to take Extra Strength Tylenol/ CAN ONLY TOLERATE REG STRENGTH TYLENOL    Vicks vaporub [camphor-eucalyptus oil-menthol]        Home Medications:  Prior to Admission medications    Medication Sig Start Date End Date Taking? Authorizing Provider   acetaminophen (TYLENOL) 325 MG tablet Take 2 tablets (650 mg total) by mouth every 4 (four) hours as needed for Pain. 3/17/17   Ryan Espinosa MD   calcium carbonate (OS-RICHARDSON) 600 mg calcium (1,500 mg) Tab Take 600 mg by mouth once.    Historical Provider, MD   carvedilol (COREG) 25 MG tablet Take 1 tablet (25 mg total) by mouth 2 (two) times daily with meals. 10/25/18   Timmy Simmons MD   cloNIDine (CATAPRES) 0.2 MG tablet Take 1 tablet (0.2 mg total) by mouth 2 (two) times daily. 10/25/18 10/25/19  Timmy Simmons MD   denosumab (PROLIA) 60 mg/mL Syrg Inject 60 mg into the skin every 6 (six) months.    Historical Provider, MD    levothyroxine (SYNTHROID) 125 MCG tablet TAKE 1 TABLET (125 MCG TOTAL) BY MOUTH ONCE DAILY. 9/13/19   Jose Valles MD   lisinopril (PRINIVIL,ZESTRIL) 40 MG tablet Take 1 tablet (40 mg total) by mouth once daily. 10/25/18   Timmy Simmons MD   magnesium 250 mg Tab Take by mouth once.    Historical Provider, MD   mirtazapine (REMERON) 7.5 MG Tab Take 1 tablet (7.5 mg total) by mouth every evening.  Patient not taking: Reported on 10/18/2019 10/17/19 10/16/20  Gamal Lock MD   ondansetron (ZOFRAN) 4 MG tablet Take 1 tablet (4 mg total) by mouth every 8 (eight) hours as needed for Nausea. 10/18/19   Jose Valles MD   OXcarbazepine (TRILEPTAL) 300 MG Tab Take 1 tablet (300 mg total) by mouth nightly. 10/8/19 10/7/20  Gamal Lock MD   oxybutynin (DITROPAN) 5 MG Tab One po every 6 hours as need for bladder irritation 1/17/19   Jose Valles MD   PHENobarbital (LUMINAL) 64.8 MG tablet TAKE 1 TABLET (64.8 MG TOTAL) BY MOUTH EVERY EVENING. 10/7/19   Gamal Lock MD   polyethylene glycol (GLYCOLAX) 17 gram PwPk Take 17 g by mouth 2 (two) times daily as needed (Constipation). 3/17/17   Ryan Espinosa MD   pravastatin (PRAVACHOL) 20 MG tablet Take 1 tablet (20 mg total) by mouth once daily. 9/18/19 9/17/20  Brice Perales, MICHELL   SHINGRIX, PF, 50 mcg/0.5 mL injection  8/20/19   Historical Provider, MD   triamcinolone acetonide 0.1% (KENALOG) 0.1 % cream Apply topically 2 (two) times daily. When needed for sores on her head 10/18/19   Jose Valles MD   vitamin D (VITAMIN D3) 1000 units Tab Take 1,000 Units by mouth once daily.    Historical Provider, MD     Family History:  Family History   Problem Relation Age of Onset    Pancreatic cancer Mother     Hypertension Father     Heart attack Father      Social History:  Social History     Tobacco Use    Smoking status: Never Smoker    Smokeless tobacco: Never Used   Substance Use Topics    Alcohol use: No    Drug use: No  "    Review of Systems:  Unable to perform ROS due to patient being intubated.    Health Maintaince :   Primary Care Physician: Jose Torres  Immunizations:   Currently on File within the [facility]:   Most Recent Immunizations   Administered Date(s) Administered    Influenza 2014    Influenza - High Dose - PF (65 years and older) 10/18/2019    Influenza - Trivalent - PF (ADULT) 2014    PPD Test 03/15/2017    Pneumococcal Conjugate - 13 Valent 2016    Pneumococcal Polysaccharide - 23 Valent 10/10/2018    Tdap 2019    Zoster Recombinant 2019      Objective:   Last 24 Hour Vital Signs:  BP  Min: 82/46  Max: 139/63  Temp  Av.9 °F (37.2 °C)  Min: 98.1 °F (36.7 °C)  Max: 99.8 °F (37.7 °C)  Pulse  Av.2  Min: 74  Max: 93  Resp  Av.4  Min: 18  Max: 41  SpO2  Av.9 %  Min: 97 %  Max: 100 %  Body mass index is 30.23 kg/m².  I/O last 3 completed shifts:  In: 3008.9 [I.V.:568.9; NG/GT:90; IV Piggyback:550]  Out: 4024 [Urine:2719; Drains:1300; Stool:5]    Physical Examination:  Triage: BP: 120/66  Pulse: 69  Temp: 97.9 °F (36.6 °C)  Resp: 20  Height: 4' 9.5" (146.1 cm)  Weight: 63.5 kg (140 lb)  BMI (Calculated): 29.8  SpO2: 99 %  Exam: BP (!) 120/58   Pulse 78   Temp 98.4 °F (36.9 °C) (Oral)   Resp 20   Ht 5' (1.524 m)   Wt 70.2 kg (154 lb 12.2 oz)   SpO2 100%   Breastfeeding? No   BMI 30.23 kg/m²     Const: Intubated.   Cardio: RRR, no murmurs, 2+ peripheral pulses  Pulm: Lungs clear to auscultation bilaterally.    Abdomen: Soft, tender to palpation.  Surgical wound well healing.  Ostomy in place, but appears dusky, bag appears to contain bowel sweat, no feces.  Skin: Warm and dry  Neuro: Minimally responsive.  Opens eyes spontaneously, withdraws to pain    Laboratory:  Most Recent Data:  CBC:   Lab Results   Component Value Date    WBC 6.09 2019    HGB 6.9 (L) 2019    PLT 45 (L) 2019    MCV 95 2019    RDW 14.2 2019 "     WBC Differential: 71 % N, 15 % Bands, 10 % L, 2 % M, 1.0 % Eo, 1.0 % Baso, no additional cells seen  BMP:   Lab Results   Component Value Date     (L) 11/01/2019    K 4.9 11/01/2019    CL 90 (L) 11/01/2019    CO2 22 (L) 11/01/2019    GLU 97 11/01/2019    BUN 96 (H) 11/01/2019    CREATININE 3.1 (H) 11/01/2019    CALCIUM 8.0 (L) 11/01/2019    MG 3.6 (H) 11/01/2019    PHOS 3.9 11/01/2019     LFTs:   Lab Results   Component Value Date    PROT 4.7 (L) 11/01/2019    ALBUMIN 2.1 (L) 11/01/2019    AST 59 (H) 11/01/2019    ALKPHOS 53 (L) 11/01/2019    ALT 29 11/01/2019     Coags:   Lab Results   Component Value Date    INR 1.1 10/31/2019     FLP:   Lab Results   Component Value Date    CHOL 220 (H) 05/09/2017    HDL 64 05/09/2017    LDLCALC 124.8 05/09/2017    TRIG 156 (H) 05/09/2017    CHOLHDL 29.1 05/09/2017     DM:   Lab Results   Component Value Date    LDLCALC 124.8 05/09/2017    CREATININE 3.1 (H) 11/01/2019     Thyroid:   Lab Results   Component Value Date    TSH 0.507 11/01/2019    FREET4 1.01 08/19/2019     Anemia:   Lab Results   Component Value Date    IRON 18 (L) 10/31/2019    TIBC 126 (L) 10/31/2019    FERRITIN 219 12/27/2016    MPOPEMUS34 334 03/03/2017    FOLATE 6.0 03/03/2017     Cardiac:   Lab Results   Component Value Date    TROPONINI <0.006 08/29/2019     (H) 08/29/2019     Urinalysis:   Lab Results   Component Value Date    COLORU Orange (A) 10/28/2019    SPECGRAV 1.025 10/28/2019    NITRITE Positive (A) 10/28/2019    PROTEINUR (-) 10/18/2019    KETONESU 1+ (A) 10/28/2019    UROBILINOGEN 2.0-3.0 (A) 10/28/2019    BILIRUBINUR (-) 10/18/2019    WBCUA 5 10/28/2019       Microbiology Data:  Microbiology Results (last 7 days)     Procedure Component Value Units Date/Time    Blood culture [[id number]] Collected:  11/01/19 1516    Order Status:  Sent Specimen:  Blood Updated:  11/01/19 1516    Narrative:       Collection has been rescheduled by AC2 at 11/01/2019 14:23 Reason: pt   recieving  unit  Collection has been rescheduled by AC2 at 11/01/2019 14:23 Reason: pt   recieving unit    Blood culture [[id number]]     Order Status:  Canceled Specimen:  Blood     Blood culture [[id number]]     Order Status:  Canceled Specimen:  Blood     Blood culture [[id number]] Collected:  11/01/19 1335    Order Status:  Sent Specimen:  Blood Updated:  11/01/19 1335    Blood Culture #1 **CANNOT BE ORDERED STAT** [[id number]]  (Abnormal)  (Susceptibility) Collected:  10/28/19 0049    Order Status:  Completed Specimen:  Blood from Peripheral, Right  Wrist Updated:  10/30/19 1024     Blood Culture, Routine Gram stain aer bottle: Gram negative rods       Results called to and read back by: Kinga Bateman RN  10/28/2019  19:01      ESCHERICHIA COLI    Blood Culture #2 **CANNOT BE ORDERED STAT** [[id number]] Collected:  10/28/19 0049    Order Status:  Sent Specimen:  Blood from Peripheral, Hand, Left Updated:  10/28/19 0050        Antimicrobials (From admission, onward)    Ordered     Dose Route Frequency Start Stop    10/30/19 1510  ertapenem (INVANZ) 0.5 g in sodium chloride 0.9% 100 mL IVPB     INDICATION:  Intra-abdominal        500 mg IV Daily 10/30/19 1600 --        Other Results:  Radiology:  Result Date: 10/27/2019  EXAMINATION: CT ABDOMEN PELVIS WITH CONTRAST CLINICAL HISTORY: Bowel obstruction, high-grade; TECHNIQUE: Low dose axial images, sagittal and coronal reformations were obtained from the lung bases to the pubic symphysis following the IV administration of 75 mL of Omnipaque 350 .  Oral contrast was not given. COMPARISON: CT chest abdomen and pelvis from August 2018. FINDINGS: The visualized portion of the heart is unremarkable.  Stable clustering of nodules is seen within the right lung base.  Lung bases show no evidence of focal consolidation or pleural effusion. No significant hepatic abnormalities are identified.  There is minimal intrahepatic biliary ductal dilatation.  Gallbladder has been removed.  Stomach  is distended with air-fluid level seen.  Fluid is seen within the distal esophagus which may relate to gastroesophageal reflux. Kidneys enhance normally with no evidence of hydronephrosis.  Urinary bladder is unremarkable.  Uterus has been removed. Postsurgical dural changes of partial bowel resection are seen.  There is long segment of abnormal small bowel wall thickening with increased mucosal enhancement consistent with acute enteritis.  No surrounding inflammatory changes are seen.  No evidence of abnormal small bowel dilatation or obstruction.  Appendix is not visualized and may have been removed.  Prominent stool is seen within the colon with large volume retained stool seen within the distal sigmoid colon and rectum.  No free air identified.  Small volume peritoneal fluid is seen along the bilateral pericolic gutters. Aorta tapers normally with prominent atherosclerosis.  Left renal artery stent is seen. No acute osseous abnormality identified.  Multilevel degenerative changes are seen within the lumbar spine with lower lumbar laminectomies seen.  Remote compression fractures are seen of the T11 and L2 vertebral bodies.  Subcutaneous soft tissue structures are unremarkable.     1. Long segment abnormal small bowel wall thickening with increased mucosal enhancement consistent with acute enteritis.  No evidence of abnormal small bowel dilatation or obstruction. 2. Prominent retained stool seen throughout the colon with large volume stool seen within the sigmoid colon and rectum.  Correlate for possible constipation and fecal impaction. 3. Small volume peritoneal free fluid seen along the bilateral pericolic gutters. 4. Postsurgical changes and multiple additional findings as detailed above. Electronically signed by: Carolina Harvey MD Date:    10/27/2019 Time:    23:37    I have reviewed the above reports as well as previous records.     Assessment:     Annette Garcia is a 84 y.o. female with PMH bowel  obstruction, colon cancer, HTN, epilepsy, hypothryoidism who was admitted for E.coli sepsis and was subsequently found to have bowel necrosis, now is s/p sigmoid colon resection and ostomy placement.  Patient remains in ICU on pressor support, minimally responsive without sedation.  Blood cultures grew pan-sensitive E.coli.  She is being treated with ertapenem and vancomycin presently.  Patient has multiple allergies to antibiotics, however tolerated amikacin, aztreonam, cefepime, flagyl, vancomycin during this admission.       Recommendations:     E. Coli sepsis  - Continue ertapenem and vancomycin  - Repeat cultures ordered to assess clearance  - Consider repeat abdominal imaging given tenderness.      Thank you for the consult, we will continue to follow.    Rob Junior MD  [facility]  Infectious Disease

## 2019-11-01 NOTE — PROGRESS NOTES
Contacted MICHELL Tejada regarding pt's morning lab results (Hct 19, mag 3.6, Na 122). Orders placed for type & screen. No other orders or interventions at this time. Will continue to monitor.

## 2019-11-01 NOTE — PT/OT/SLP PROGRESS
Physical Therapy Treatment    Patient Name:  Annette Garcia   MRN:  [id number]    Recommendations:     Discharge Recommendations:  (TBD)   Discharge Equipment Recommendations: (TBD)   Barriers to discharge: ongoing assessment    Assessment:     Annette Garcia is a 84 y.o. female admitted with a medical diagnosis of Colon necrosis.  She presents with the following impairments/functional limitations:  weakness, impaired endurance, gait instability, impaired functional mobilty, impaired self care skills, impaired balance, decreased upper extremity function, decreased lower extremity function, impaired cognition, decreased coordination, decreased safety awareness, decreased ROM, impaired coordination, visual deficits, abnormal tone, impaired fine motor, impaired cardiopulmonary response to activity, edema, impaired skin Pt continues with eye opening to voice, tactile stimulation; increased resistance to PROM; cont with POC.    Rehab Prognosis: Poor; patient would benefit from acute skilled PT services to address these deficits and reach maximum level of function.    Recent Surgery: Procedure(s) (LRB):  Insertion, Pacemaker, Temporary Transvenous (Right) 2 Days Post-Op    Plan:     During this hospitalization, patient to be seen (3-5x/wk) to address the identified rehab impairments via gait training, therapeutic activities, therapeutic exercises, neuromuscular re-education(when appropriate) and progress toward the following goals:    · Plan of Care Expires:  12/01/19    Subjective       Pain/Comfort:  · No s/s of pain      Objective:     Communicated with nurse prior to session.  Patient found with central line, gonzales catheter, blood pressure cuff, pressure relief boots, ventilator, peripheral IV, pulse ox (continuous), arterial line(TVP) upon PT entry to room.     General Precautions: Standard, fall, aspiration, NPO   Orthopedic Precautions:N/A   Braces: N/A     Functional Mobility:  · dep      AM-PAC 6 CLICK  MOBILITY  Turning over in bed (including adjusting bedclothes, sheets and blankets)?: 1  Sitting down on and standing up from a chair with arms (e.g., wheelchair, bedside commode, etc.): 1  Moving from lying on back to sitting on the side of the bed?: 1  Moving to and from a bed to a chair (including a wheelchair)?: 1  Need to walk in hospital room?: 1  Climbing 3-5 steps with a railing?: 1  Basic Mobility Total Score: 6       Therapeutic Activities and Exercises:   Patient with eye opening to voice and tactile stimulation; tolerated PROM to BLEs 15 reps each with stretching to B heel cords; pt spontaneously moving B feet but not upon command.    Patient left HOB elevated with all lines intact, call button in reach and nurse notified..    GOALS:   Multidisciplinary Problems     Physical Therapy Goals        Problem: Physical Therapy Goal    Goal Priority Disciplines Outcome Goal Variances Interventions   Physical Therapy Goal     PT, PT/OT Ongoing, Progressing     Description:  1.  Roll bilaterally with supervision  2.  Supine to/from sit with supervision  3.  Bed to/from chair with CG  4.  Ambulate 50' with RW with CG  5.  Sit in chair 2 hours           Problem: Physical Therapy Goal    Goal Priority Disciplines Outcome Goal Variances Interventions   Physical Therapy Goal     PT, PT/OT Ongoing, Not Progressing     Description:  Goals to be met by:      Patient will increase functional independence with mobility by performin. Supine to sit with Moderate Assistance  2. Sit to supine with Moderate Assistance  3. Rolling to Left and Right with Moderate Assistance.  4. Sit to stand transfer with Moderate Assistance using RW  5. Bed to chair transfer with Minimal Assistance and Moderate Assistance using Rolling Walker  6. Gait  x 25 feet with Minimal Assistance and Moderate Assistance using Rolling Walker.   7. Lower extremity exercise program x10 with active BLE with assistance as needed                       Time Tracking:     PT Received On: 11/01/19  PT Start Time: 1602     PT Stop Time: 1617  PT Total Time (min): 15 min     Billable Minutes: Therapeutic Exercise 15 minutes    Treatment Type: Treatment  PT/PTA: PT     PTA Visit Number: 0     Kinga Velásquez, PT  11/01/2019

## 2019-11-01 NOTE — ASSESSMENT & PLAN NOTE
-no history of afib  -no admission EKG; EKG on 10/30 with afib with RVR with   -on BB at home for HTN management; on hold since admission (10/27) due to acute issues  -no BB or CCB due to sinus pauses  -current appears NSR   -no chronic AC due to recent surgical intervention and risk of bleeding

## 2019-11-01 NOTE — PLAN OF CARE
Problem: Physical Therapy Goal  Goal: Physical Therapy Goal  Description  Goals to be met by:      Patient will increase functional independence with mobility by performin. Supine to sit with Moderate Assistance  2. Sit to supine with Moderate Assistance  3. Rolling to Left and Right with Moderate Assistance.  4. Sit to stand transfer with Moderate Assistance using RW  5. Bed to chair transfer with Minimal Assistance and Moderate Assistance using Rolling Walker  6. Gait  x 25 feet with Minimal Assistance and Moderate Assistance using Rolling Walker.   7. Lower extremity exercise program x10 with active BLE with assistance as needed     2019 1715 by Kinga Velásquez, PT  Outcome: Ongoing  Pt continues with eye opening to voice, tactile stimulation; increased resistance to PROM; cont with POC

## 2019-11-01 NOTE — ASSESSMENT & PLAN NOTE
Fecal obstruction  E. coli bacteremia  E. coli septic shock  Atrial fibrillation  Acute tubular necrosis  Acute metabolic encephalopathy  Sinus pauses  DIC (disseminated intravascular coagulation)  On meropenem. Status post sigmoidectomy. In multi-organ failure. Still intubated with poor mentation. Appreciate General Surgery, Pulmonology, Cardiology, Nephrology.

## 2019-11-01 NOTE — SUBJECTIVE & OBJECTIVE
Oncology Treatment Plan:   OP R-CHOP    Medications:  Continuous Infusions:   Amino acid 5% - dextrose 15% (CLINIMIX-E) solution with additives (1L  provides 510 kcal/L dextrose, with 50 gm AA, 150 gm CHO, Na 35, K 30, Mg 5, Ca 4.5, Acetate 80, Cl 39, Phos 15) 75 mL/hr at 10/31/19 1747    bumetanide (BUMEX)0.25 mg/mL infusion 1 mg/hr (11/01/19 1123)    DOPamine Stopped (11/01/19 1545)    TPN ADULT CENTRAL LINE CUSTOM       Scheduled Meds:   bumetanide  2 mg Intravenous Daily    ertapenem (INVANZ) IVPB  500 mg Intravenous Daily    famotidine (PF)  20 mg Intravenous Daily    levothyroxine  125 mcg Oral Before breakfast    OXcarbazepine  300 mg Oral QHS    PHENobarbital  64.8 mg Oral Nightly    pravastatin  20 mg Oral Daily     PRN Meds:sodium chloride, acetaminophen, albuterol-ipratropium, bumetanide, morphine, ondansetron, promethazine (PHENERGAN) IVPB, ramelteon, sodium chloride 0.9%     Review of patient's allergies indicates:   Allergen Reactions    Adhesive Itching and Blisters    Penicillins Anaphylaxis    Tramadol Hives    Avelox [moxifloxacin] Rash     Facial and arm itching and redness. Pt states throat closes when given.    Amoxil [amoxicillin]     Aspridrox [aspirin, buffered]     Codeine Other (See Comments)     Throat swelling    Keflex [cephalexin]      Tolerated cefepime    Norvasc [amlodipine]     Red dye Hives    Robitussin [guaifenesin]     Sulfa (sulfonamide antibiotics)     Tylenol [acetaminophen]      Has reaction to Tylenol with red dye and unable to take Extra Strength Tylenol/ CAN ONLY TOLERATE REG STRENGTH TYLENOL    Vicks vaporub [camphor-eucalyptus oil-menthol]         Past Medical History:   Diagnosis Date    Cancer     colon    Hypertension     Petit mal epilepsy 1954    Scoliosis of lumbar spine     Seizures     Unspecified hypothyroidism      Past Surgical History:   Procedure Laterality Date    APPENDECTOMY      BACK SURGERY  1988    vertebral fracture     BACK SURGERY  02/2013    lumbar L2-5    CATARACT EXTRACTION, BILATERAL Bilateral     CHOLECYSTECTOMY      open    EYE SURGERY Bilateral     cataract removal with lens implant    HYSTERECTOMY      PORTACATH PLACEMENT Right 09/2016    RENAL ARTERY STENT Left 07/19/2017    SIGMOIDECTOMY  10/29/2019    SMALL INTESTINE SURGERY  08/23/2016    TONSILLECTOMY      VAGINAL HYSTERECTOMY W/ ANTERIOR AND POSTERIOR VAGINAL REPAIR       Family History     Problem Relation (Age of Onset)    Heart attack Father    Hypertension Father    Pancreatic cancer Mother        Tobacco Use    Smoking status: Never Smoker    Smokeless tobacco: Never Used   Substance and Sexual Activity    Alcohol use: No    Drug use: No    Sexual activity: Not on file       Review of Systems   Unable to perform ROS: Intubated     Objective:     Vital Signs (Most Recent):  Temp: 98.1 °F (36.7 °C) (11/01/19 1345)  Pulse: 77 (11/01/19 1545)  Resp: 20 (11/01/19 1545)  BP: (!) 124/59 (11/01/19 1545)  SpO2: 100 % (11/01/19 1545) Vital Signs (24h Range):  Temp:  [98.1 °F (36.7 °C)-99.8 °F (37.7 °C)] 98.1 °F (36.7 °C)  Pulse:  [74-93] 77  Resp:  [18-41] 20  SpO2:  [97 %-100 %] 100 %  BP: ()/(46-63) 124/59     Weight: 70.2 kg (154 lb 12.2 oz)  Body mass index is 30.23 kg/m².  Body surface area is 1.72 meters squared.      Intake/Output Summary (Last 24 hours) at 11/1/2019 1554  Last data filed at 11/1/2019 1500  Gross per 24 hour   Intake 3458.88 ml   Output 2305 ml   Net 1153.88 ml       Physical Exam   Constitutional: She appears well-developed.   fatigued   HENT:   Head: Normocephalic.   Neck: Neck supple.   Cardiovascular: Regular rhythm.   Pulmonary/Chest:   intubated   Abdominal: Soft.   Musculoskeletal: She exhibits edema (trace edema in lower extremities).   Neurological:   Unable to assess   Skin: Skin is warm and dry.   Psychiatric:   Unable to assess   Nursing note and vitals reviewed.      Significant Labs:   Labs have been  reviewed.    Lab Results   Component Value Date    WBC 6.09 11/01/2019    HGB 6.9 (L) 11/01/2019    HCT 19.6 (LL) 11/01/2019    MCV 95 11/01/2019    PLT 45 (L) 11/01/2019

## 2019-11-01 NOTE — PLAN OF CARE
Limited OT eval, report to follow    Ongoing assessment of post acute needs      Problem: Occupational Therapy Goal  Goal: Occupational Therapy Goal  Description  Goals to be met by: 12/1     Patient will increase functional independence with ADLs by performing:  Pt will maintain gaze x 30 secs  Pt will follow 1 step commands 25% of the time  Pt's family will be indep w/PROM       Outcome: Ongoing, Progressing

## 2019-11-01 NOTE — PT/OT/SLP EVAL
Occupational Therapy   Evaluation    Name: Annette Garcia  MRN: [id number]  Admitting Diagnosis:  Colon necrosis 2 Days Post-Op    Recommendations:     Discharge Recommendations: other (see comments)(TBD)  Discharge Equipment Recommendations:  other (see comments)(TBD)  Barriers to discharge:  Other (Comment)(ongoing assessment)    Assessment:     Annette Garcia is a 84 y.o. female with a medical diagnosis of Colon necrosis.  She presents with performance deficits affecting function: weakness, impaired endurance, gait instability, impaired functional mobilty, impaired self care skills, impaired balance, decreased coordination, decreased ROM, impaired skin, edema, impaired coordination, decreased upper extremity function, visual deficits, impaired cognition, decreased lower extremity function, abnormal tone, impaired fine motor, impaired cardiopulmonary response to activity.      Rehab Prognosis: Poor; patient would benefit from acute skilled OT services to address these deficits and reach maximum level of function.       Plan:     Patient to be seen 3 x/week to address the above listed problems via self-care/home management, therapeutic activities, therapeutic exercises  · Plan of Care Expires: 12/01/19  · Plan of Care Reviewed with:      Subjective     Chief Complaint: nonverbal  Patient/Family Comments/goals: unable to state    Occupational Profile:  Living Environment: Per chart lives alone [facility] [address]  Previous level of function: indep mobility in house no AD; rollator community, daughter drives her places  Roles and Routines:   Equipment Used at Home:  wheelchair, walker, rolling, shower chair, cane, straight, bedside commode  Assistance upon Discharge: family    Pain/Comfort:  · Pain Rating 1: other (see comments)(no s/s of pain)  · Pain Rating Post-Intervention 1: other (see comments)(no s/s of pain)    Patients cultural, spiritual, Hinduism conflicts given the current situation: no    Objective:      Communicated with: nurse prior to session.  Patient found HOB elevated with peripheral IV, telemetry, pulse ox (continuous), arterial line, blood pressure cuff, central line, gonzales catheter, pressure relief boots, ventilator(transvenous pacer) upon OT entry to room.    General Precautions: Standard, fall, aspiration, NPO   Orthopedic Precautions:    Braces:       Occupational Performance:    Bed Mobility:    · NT; however dep    Functional Mobility/Transfers:  · NT however dep    Activities of Daily Living:  · NT however dep    Cognitive/Visual Perceptual:  Minimal eye opening to voice and tactile stim    Physical Exam:  No purposeful AROM noted; noted minimal AROM BUE  Increased resistance noted during PROM BUE    AMPA 6 Click ADL:  AMPA Total Score: 6    Treatment & Education:  Limited eval performed. Minimal eye opening to voice and tactile stim, no command following noted  PROM BUE within available range limited by medical devices  Education:    Patient left HOB elevated with all lines intact, call button in reach and nurse notified    GOALS:   Multidisciplinary Problems     Occupational Therapy Goals        Problem: Occupational Therapy Goal    Goal Priority Disciplines Outcome Interventions   Occupational Therapy Goal     OT, PT/OT Ongoing, Progressing    Description:  Goals to be met by: 12/1     Patient will increase functional independence with ADLs by performing:  Pt will maintain gaze x 30 secs  Pt will follow 1 step commands 25% of the time  Pt's family will be indep w/PROM                        History:     Past Medical History:   Diagnosis Date    Cancer     colon    Hypertension     Petit mal epilepsy 1954    Scoliosis of lumbar spine     Seizures     Unspecified hypothyroidism        Past Surgical History:   Procedure Laterality Date    APPENDECTOMY      BACK SURGERY  1988    vertebral fracture    BACK SURGERY  02/2013    lumbar L2-5    CATARACT EXTRACTION, BILATERAL Bilateral      CHOLECYSTECTOMY      open    EYE SURGERY Bilateral     cataract removal with lens implant    HYSTERECTOMY      PORTACATH PLACEMENT Right 09/2016    RENAL ARTERY STENT Left 07/19/2017    SIGMOIDECTOMY  10/29/2019    SMALL INTESTINE SURGERY  08/23/2016    TONSILLECTOMY      VAGINAL HYSTERECTOMY W/ ANTERIOR AND POSTERIOR VAGINAL REPAIR         Time Tracking:     OT Date of Treatment: 11/01/19  OT Start Time: 1146  OT Stop Time: 1157  OT Total Time (min): 11 min    Billable Minutes:Evaluation 11    Ronnie Sutton OT  11/1/2019

## 2019-11-01 NOTE — PROGRESS NOTES
[facility] Pulmonology Progress Note    SUBJECTIVE:     Patient with no acute events overnight. Patient continues to be intubated without sedation. No change in mental status.    OBJECTIVE:     Vital Signs  Vitals:    11/01/19 0645 11/01/19 0700 11/01/19 0715 11/01/19 0730   BP:  (!) 111/53 (!) 112/53    Pulse: 79 80 80    Resp: (!) 24 20 20    Temp:    98.9 °F (37.2 °C)   TempSrc:    Oral   SpO2: 100% 100% 100%    Weight:       Height:         Body mass index is 30.23 kg/m².    Physical Exam:  GEN- Intubated, not sedated, opens eyes to command but does not follow commands.   HEENT- PERRL, corneal intact, intubated  NECK- No thyromegaly or other masses, RIJ and TVP in place  CARDIAC- RRR, no murmurs or rubs  RESP- CTAB, normal work of breathing, no wheezes, crackles, or rhonchi  ABD- Soft, midline incision with bandage overtop, colostomy with red viable stoma, no bloody output.   EXT- No clubbing, cyanosis, or edema; 1+ DP/PT pulses  NEURO- No verbal response, opens eyes to command, does not follow other simple commands, pupils, corneal, and gag intact, withdrawals from pain.   SKIN- Warm and dry; no rashes    Laboratory:  Trended Lab Data:   Recent Labs   Lab 10/30/19  0522  10/30/19  1510 10/30/19  2102 10/31/19  0410 10/31/19  1110 10/31/19  1112 10/31/19  1645 10/31/19  2019 11/01/19  0441   WBC 5.56  --   --   --  4.36  --  4.34  --   --  6.09   HGB 9.2*  --   --   --  7.4*  --  7.3*  --   --  6.9*   HCT 27.0*  --   --   --  21.8*  --  21.1*  --   --  19.6*   PLT 69*  --   --   --  48*  --  41*  --   --  45*   MCV 98  --   --   --  97  --  96  --   --  95   RDW 14.2  --   --   --  14.0  --  14.1  --   --  14.2   *   < > 124* 124* 125* 124*  --  124* 123* 122*  122*   K 4.7   < > 4.3 3.7 4.1 4.0  --  4.5 4.9 4.8  4.8   CL 93*   < > 92* 92* 92* 92*  --  92* 92* 90*  90*   CO2 20*   < > 22* 22* 24 23  --  23 24 23  23   BUN 51*   < > 55* 56* 62* 67*  --  71* 74* 83*  83*   CREATININE 2.1*   < > 2.4* 2.4* 2.5*  2.5*  --  2.7* 2.8* 3.1*  3.1*   GLU 86   < > 200* 205* 182* 177*  --  149* 171* 168*  168*   CALCIUM 7.6*   < > 7.1* 7.6* 7.6* 7.6*  --  7.7* 7.7* 7.9*  7.9*   MG  --    < >  --  3.7*  --  3.7*  --   --   --  3.6*   PHOS 5.2*  --   --  4.0  --   --   --   --   --  3.9   PROT  --   --  3.9*  --  4.5*  --   --   --   --  4.7*   ALBUMIN  --   --  2.2*  --  2.0* 1.9*  --   --   --  2.1*   AST  --   --  61*  --  62*  --   --   --   --  59*   ALT  --   --  46*  --  39  --   --   --   --  29   ALKPHOS  --   --  40*  --  45*  --   --   --   --  53*   BILITOT  --   --  1.0  --  1.0  --   --   --   --  1.0    < > = values in this interval not displayed.      Trended Cardiac Data:   No results for input(s): TROPONINI, CKTOTAL, CKMB, BNP in the last 168 hours.   Trended Cardiac Data:   No results for input(s): POCTGLUCOSE in the last 168 hours.       ASSESSMENT/PLAN:     Neuro    Acute Encephalopathy  -not currently on sedation  -Hx of Epilepsy; on home phenobarbital subtherapeutic, trileptal pending  -(phenobarb is renally and gi cleared and oxcarbazepine is renally cleared)  -Electrolyte derangements (Na 124)   -Uosm elevated; Tia low; likely hypervolemic hyponatremia  -Recommend continue diuresis and hypotonic fluid restriction  -follow Na every 4h (do not correct > 0.5meq/h or 12 in a 24h period  -Critical illness encephalopathy    CV    Shock  -Likely septic shock in the setting of bowel necrosis  -Currently requiring pressor support  -May not have achieved adequate source control given spillage of bowel contents  -Abx broadened (currently on Vanc and ertapenem); no cx from abd fluid sent    Sinus Pause  -Electrolytes wnl  -Cards consulted and TVP placed; currently on dopamine; appears to be almost 100% paced    Pulm  Acute Respiratory Failure  -Returned from OR on vent; unable to wean d/t mental status  -Failed SBT again this AM; mostly d/t to mental status  -Daily SBT  -Continue LPMV    Renal    JAS  -Cr 3.1 this  AM  -Volume overloaded; continue to diurese  -renal dose meds    Hyponatremia  -Na continuing to fall with fluid andministration; diuresed yesterday and Na stable, 124 this AM.   -Urine studies c/w hypervolemic hyponatremia; would continue to diurese  -f/u BMP every 4 hours    FEN  I/O: +6.625 (net positive yesterday despite diuresis)  Electrolytes  -Hyponatremia; see above    GI  Bowel Necrosis s/p resection and colostomy placement  -still with absent Bowel sounds and minimal output via stoma    Heme  Pancytopenia  - possibly secondary to sepsis vs drugs (on AEDs, checking levels, on beta lactams)  -PTT 44.8, Fibrinogen 626, D-dimer 3.78  -not currently on any heparin products  - Low hgb may be secondary to recent surgery. Hbg 6.9 this AM. Patient types and crossed for transfusion. Will need 1 unit.     ID    Septic Shock  -BCx +GNR; now E. Coli pan sensitive   -Currently on Invanz and vanc   -Abx broadened as still in shock and in setting of suspected polymicrobial infection d/t gut spillage    PPx  -GI ppx w/ pepcid  -Recommend starting DVT ppx    Sachin Polanco MD  Internal/Emergency Medicine, PGY-II

## 2019-11-01 NOTE — HPI
84 year-old female with history of diffuse large B-cell lymphoma (patient of Dr. Arredondo) was admitted for bowel obstruction and sepsis. She underwent sigmoid colectomy with end colostomy on 10/29/19. Consult is for anemia/thrombocytopenia.

## 2019-11-01 NOTE — PROGRESS NOTES
Discussed with family about dialysis.  Explained risk of hypotension, hypovolemia,   arrhythmia,  death during dialysis.

## 2019-11-01 NOTE — PLAN OF CARE
No acute changes overnight. Pt does not follow commands but arouses/withdraws to pain. Pupils reactive & cough/gag reflex present. TVP site clean, dry and intact. Abdominal incision site clean, dry and intact. UOP >50 cc/hr this shift. Call light within reach, side rails up and bed alarm on. Safety maintained. Will continue to monitor.

## 2019-11-01 NOTE — CONSULTS
[facility]  Hematology/Oncology  Consult Note    Patient Name: Annette Garcia  MRN: [id number]  Admission Date: 10/27/2019  Hospital Length of Stay: 4 days  Code Status: Full Code   Attending Provider: José Manuel Guidry MD  Consulting Provider: Charles Reeves MD  Primary Care Physician: Jose Valles MD  Principal Problem:Colon necrosis    Inpatient consult to Hematology/Oncology  Consult performed by: Charles Reeves MD  Consult ordered by: Shaye Vale MD  Reason for consult: anemia, thrombocytopenia        Subjective:     HPI:  84 year-old female with history of diffuse large B-cell lymphoma (patient of Dr. Arredondo) was admitted for bowel obstruction and sepsis. She underwent sigmoid colectomy with end colostomy on 10/29/19. Consult is for anemia/thrombocytopenia.    Oncology Treatment Plan:   OP R-CHOP    Medications:  Continuous Infusions:   Amino acid 5% - dextrose 15% (CLINIMIX-E) solution with additives (1L  provides 510 kcal/L dextrose, with 50 gm AA, 150 gm CHO, Na 35, K 30, Mg 5, Ca 4.5, Acetate 80, Cl 39, Phos 15) 75 mL/hr at 10/31/19 1747    bumetanide (BUMEX)0.25 mg/mL infusion 1 mg/hr (11/01/19 1123)    DOPamine Stopped (11/01/19 1545)    TPN ADULT CENTRAL LINE CUSTOM       Scheduled Meds:   bumetanide  2 mg Intravenous Daily    ertapenem (INVANZ) IVPB  500 mg Intravenous Daily    famotidine (PF)  20 mg Intravenous Daily    levothyroxine  125 mcg Oral Before breakfast    OXcarbazepine  300 mg Oral QHS    PHENobarbital  64.8 mg Oral Nightly    pravastatin  20 mg Oral Daily     PRN Meds:sodium chloride, acetaminophen, albuterol-ipratropium, bumetanide, morphine, ondansetron, promethazine (PHENERGAN) IVPB, ramelteon, sodium chloride 0.9%     Review of patient's allergies indicates:   Allergen Reactions    Adhesive Itching and Blisters    Penicillins Anaphylaxis    Tramadol Hives    Avelox [moxifloxacin] Rash     Facial and arm itching and redness. Pt states  throat closes when given.    Amoxil [amoxicillin]     Aspridrox [aspirin, buffered]     Codeine Other (See Comments)     Throat swelling    Keflex [cephalexin]      Tolerated cefepime    Norvasc [amlodipine]     Red dye Hives    Robitussin [guaifenesin]     Sulfa (sulfonamide antibiotics)     Tylenol [acetaminophen]      Has reaction to Tylenol with red dye and unable to take Extra Strength Tylenol/ CAN ONLY TOLERATE REG STRENGTH TYLENOL    Vicks vaporub [camphor-eucalyptus oil-menthol]         Past Medical History:   Diagnosis Date    Cancer     colon    Hypertension     Petit mal epilepsy 1954    Scoliosis of lumbar spine     Seizures     Unspecified hypothyroidism      Past Surgical History:   Procedure Laterality Date    APPENDECTOMY      BACK SURGERY  1988    vertebral fracture    BACK SURGERY  02/2013    lumbar L2-5    CATARACT EXTRACTION, BILATERAL Bilateral     CHOLECYSTECTOMY      open    EYE SURGERY Bilateral     cataract removal with lens implant    HYSTERECTOMY      PORTACATH PLACEMENT Right 09/2016    RENAL ARTERY STENT Left 07/19/2017    SIGMOIDECTOMY  10/29/2019    SMALL INTESTINE SURGERY  08/23/2016    TONSILLECTOMY      VAGINAL HYSTERECTOMY W/ ANTERIOR AND POSTERIOR VAGINAL REPAIR       Family History     Problem Relation (Age of Onset)    Heart attack Father    Hypertension Father    Pancreatic cancer Mother        Tobacco Use    Smoking status: Never Smoker    Smokeless tobacco: Never Used   Substance and Sexual Activity    Alcohol use: No    Drug use: No    Sexual activity: Not on file       Review of Systems   Unable to perform ROS: Intubated     Objective:     Vital Signs (Most Recent):  Temp: 98.1 °F (36.7 °C) (11/01/19 1345)  Pulse: 77 (11/01/19 1545)  Resp: 20 (11/01/19 1545)  BP: (!) 124/59 (11/01/19 1545)  SpO2: 100 % (11/01/19 1545) Vital Signs (24h Range):  Temp:  [98.1 °F (36.7 °C)-99.8 °F (37.7 °C)] 98.1 °F (36.7 °C)  Pulse:  [74-93] 77  Resp:  [18-41]  20  SpO2:  [97 %-100 %] 100 %  BP: ()/(46-63) 124/59     Weight: 70.2 kg (154 lb 12.2 oz)  Body mass index is 30.23 kg/m².  Body surface area is 1.72 meters squared.      Intake/Output Summary (Last 24 hours) at 11/1/2019 1554  Last data filed at 11/1/2019 1500  Gross per 24 hour   Intake 3458.88 ml   Output 2305 ml   Net 1153.88 ml       Physical Exam   Constitutional: She appears well-developed.   fatigued   HENT:   Head: Normocephalic.   Neck: Neck supple.   Cardiovascular: Regular rhythm.   Pulmonary/Chest:   intubated   Abdominal: Soft.   Musculoskeletal: She exhibits edema (trace edema in lower extremities).   Neurological:   Unable to assess   Skin: Skin is warm and dry.   Psychiatric:   Unable to assess   Nursing note and vitals reviewed.      Significant Labs:   Labs have been reviewed.    Lab Results   Component Value Date    WBC 6.09 11/01/2019    HGB 6.9 (L) 11/01/2019    HCT 19.6 (LL) 11/01/2019    MCV 95 11/01/2019    PLT 45 (L) 11/01/2019         Assessment/Plan:     Thrombocytopenia  - I have reviewed the peripheral smear. Platelets are decreased. There is no platelet clumping.  - I suspect her thrombocytopenia is from surgery/acute conditions/infection.  - it appears she only received minimal heparin (a few flushes). Suspicion for heparin-induced thrombocytopenia is low, given other more likely competing causes of thrombocytopenia.  - she is receiving multiple antibiotics that can cause drug-induced thrombocytopenia. Consider narrowing/switching antibiotics if platelet count continues to decrease.    Anemia of acute infection  - I have reviewed the peripheral smear. There is a rare schistocyte per high-power field. Rouleaux formation is seen, suggestive of acute conditions/infection/hospitalization as the cause of her anemia.  - iron studies (10/31/19) revealed a decreased total iron binding capacity. This is consistent with anemia of acute infection/inflammation/hospitalization.  - continue  supportive care.    DLBCL (diffuse large B cell lymphoma)  - patient of Dr. Arredondo. She completed 6 cycles of R-CHOP; last dose in January 2017.  - pathology from surgery (10/29/19) was negative for lymphoma  - no signs of recurrent disease        Thank you for your consult.     Charles Reeves MD  Hematology/Oncology  [facility]

## 2019-11-01 NOTE — SUBJECTIVE & OBJECTIVE
Interval History: Remains intubated. RSBI 50-70 on minimal vent settings. Able to open eyes to voice and move hands to voice. No ostomy output. Hemoglobin slightly downtrending. Continues to make urine.     Medications:  Continuous Infusions:   sodium chloride 0.9% 100 mL/hr at 11/01/19 1021    Amino acid 5% - dextrose 15% (CLINIMIX-E) solution with additives (1L  provides 510 kcal/L dextrose, with 50 gm AA, 150 gm CHO, Na 35, K 30, Mg 5, Ca 4.5, Acetate 80, Cl 39, Phos 15) 75 mL/hr at 10/31/19 1747    bumetanide (BUMEX)0.25 mg/mL infusion      DOPamine 5 mcg/kg/min (11/01/19 0820)    norepinephrine bitartrate-D5W      TPN ADULT CENTRAL LINE CUSTOM       Scheduled Meds:   bumetanide  2 mg Intravenous Daily    ertapenem (INVANZ) IVPB  500 mg Intravenous Daily    famotidine (PF)  20 mg Intravenous Daily    levothyroxine  125 mcg Oral Before breakfast    OXcarbazepine  300 mg Oral QHS    PHENobarbital  64.8 mg Oral Nightly    pravastatin  20 mg Oral Daily     PRN Meds:sodium chloride, acetaminophen, albuterol-ipratropium, bumetanide, morphine, ondansetron, promethazine (PHENERGAN) IVPB, ramelteon, sodium chloride 0.9%     Review of patient's allergies indicates:   Allergen Reactions    Adhesive Itching and Blisters    Penicillins Anaphylaxis    Tramadol Hives    Avelox [moxifloxacin] Rash     Facial and arm itching and redness. Pt states throat closes when given.    Amoxil [amoxicillin]     Aspridrox [aspirin, buffered]     Codeine Other (See Comments)     Throat swelling    Keflex [cephalexin]      Tolerated cefepime    Norvasc [amlodipine]     Red dye Hives    Robitussin [guaifenesin]     Sulfa (sulfonamide antibiotics)     Tylenol [acetaminophen]      Has reaction to Tylenol with red dye and unable to take Extra Strength Tylenol/ CAN ONLY TOLERATE REG STRENGTH TYLENOL    Vicks vaporub [camphor-eucalyptus oil-menthol]      Objective:     Vital Signs (Most Recent):  Temp: 98.9 °F (37.2 °C)  (11/01/19 0730)  Pulse: 82 (11/01/19 0915)  Resp: 20 (11/01/19 0915)  BP: 139/63 (11/01/19 0915)  SpO2: 100 % (11/01/19 0915) Vital Signs (24h Range):  Temp:  [98.8 °F (37.1 °C)-99.8 °F (37.7 °C)] 98.9 °F (37.2 °C)  Pulse:  [] 82  Resp:  [20-41] 20  SpO2:  [93 %-100 %] 100 %  BP: ()/(46-85) 139/63     Weight: 70.2 kg (154 lb 12.2 oz)  Body mass index is 30.23 kg/m².    Intake/Output - Last 3 Shifts       10/30 0700 - 10/31 0659 10/31 0700 - 11/01 0659 11/01 0700 - 11/02 0659    P.O.  0     I.V. (mL/kg) 966.2 (13.8) 568.9 (8.1)     NG/GT  90     IV Piggyback 450 550 100    .5 1800     Total Intake(mL/kg) 1783.7 (25.4) 3008.9 (42.9) 100 (1.4)    Urine (mL/kg/hr) 2299 (1.4) 1525 (0.9) 230 (0.8)    Drains 1400 800     Stool 10 0     Total Output 3709 2325 230    Net -1925.4 +683.9 -130           Stool Occurrence  2 x           Physical Exam   Constitutional: She appears well-developed. She appears lethargic. No distress. She is intubated.   Cardiovascular: Normal rate and regular rhythm.   Pulmonary/Chest: She is intubated.   Abdominal: Soft. There is no guarding.   Surgical dressing intact   Musculoskeletal: She exhibits edema. She exhibits no tenderness.   Neurological: She appears lethargic.   Nursing note and vitals reviewed.      Significant Labs:  CBC:   Recent Labs   Lab 11/01/19 0441   WBC 6.09   RBC 2.07*   HGB 6.9*   HCT 19.6*   PLT 45*   MCV 95   MCH 33.3*   MCHC 35.2     CMP:   Recent Labs   Lab 11/01/19 0441   *  168*   CALCIUM 7.9*  7.9*   ALBUMIN 2.1*   PROT 4.7*   *  122*   K 4.8  4.8   CO2 23  23   CL 90*  90*   BUN 83*  83*   CREATININE 3.1*  3.1*   ALKPHOS 53*   ALT 29   AST 59*   BILITOT 1.0         Significant Diagnostics:  I have reviewed all pertinent imaging results/findings within the past 24 hours.

## 2019-11-01 NOTE — PROGRESS NOTES
This is an attestation of a separate MICU house staff note from today.    83 y/o F who presented initially with colonic ischemic requiring resection, septic shock, acute hypoxic resp failure.  Septic shock has essentially resolved, has a TVP for bradycardia.  RASS -4 on no sedation, passing SBT.  No stool in ostomy.      Recommendations:  1.  Continue to hold all sedatives, if she wakes up more we can extubate her  2.  Would stop TPN as she is not chronically malnourished and has acute critical illness where the recommendation is not to use TPN until after 7 days if we can't enterally feed her.  Expect that we will be able to enterally feed her soon.    3.  abx for ecoli in blood  4.  DVT prophy  5.  Family said the patient would not want prolonged vent or ICU support if she isn't improving but think she would be accepting of our current care  6.  LPVS with 6cc/kg PBW in the interim, daily SBTs.    90 minutes of critical care time was spent in the care of the patient. This included management of organ failures related to critical illness, titration of continuous infusions, management of mechanical ventilation, review of pertinent labs and imaging studies, discussion of the patient with consulting services, and discussion of the patients condition with the  family.

## 2019-11-01 NOTE — PROGRESS NOTES
Progress Note  Nephrology      Consult Requested By: José Manuel Guidry MD      SUBJECTIVE:     Overnight events  Patient is a 84 y.o. female     Patient Active Problem List   Diagnosis    Renovascular hypertension    Epilepsy    Acquired hypothyroidism    Osteoarthritis of lumbar spine    Degenerative joint disease of sacroiliac joint    S/P exploratory laparotomy    DLBCL (diffuse large B cell lymphoma)    Chronic hyponatremia    Anemia due to antineoplastic chemotherapy    Bilateral renal artery stenosis    Closed comminuted fracture of right humerus    Closed displaced fracture of distal phalanx of right little finger    Age-related osteoporosis with current pathological fracture with routine healing    Closed wedge compression fracture of eleventh thoracic vertebra with routine healing    DDD (degenerative disc disease), lumbar    Chronic bilateral low back pain without sciatica    Age-related osteoporosis without current pathological fracture    Vitamin D deficiency    Uncontrolled hypertension    Subjective memory complaints    Depression    E. coli septic shock    Abnormal liver enzymes    Old MI (myocardial infarction)    Peripheral vascular disease, unspecified    History of stent insertion of left renal artery 7/19/17    Colon necrosis    Slow transit constipation    Fecal obstruction    E coli bacteremia    Abdominal pain    Paroxysmal atrial fibrillation    Sinus pause     Past Medical History:   Diagnosis Date    Cancer     colon    Hypertension     Petit mal epilepsy 1954    Scoliosis of lumbar spine     Seizures     Unspecified hypothyroidism               OBJECTIVE:     Vitals:    11/01/19 0840 11/01/19 0845 11/01/19 0900 11/01/19 0915   BP: (!) 119/59 (!) 118/59 (!) 87/52 139/63   Pulse: 80 80 81 82   Resp: 20 20 20 20   Temp:       TempSrc:       SpO2: 100% 100% 100% 100%   Weight:       Height:           Temp: 98.9 °F (37.2 °C) (11/01/19 0730)  Pulse: 82  (11/01/19 0915)  Resp: 20 (11/01/19 0915)  BP: 139/63 (11/01/19 0915)  SpO2: 100 % (11/01/19 0915)    Date 11/01/19 0700 - 11/02/19 0659   Shift 0861-3488 7154-0340 8098-1237 24 Hour Total   INTAKE   IV Piggyback 100   100   Shift Total(mL/kg) 100(1.4)   100(1.4)   OUTPUT   Urine(mL/kg/hr) 165   165   Shift Total(mL/kg) 165(2.4)   165(2.4)   Weight (kg) 70.2 70.2 70.2 70.2             Medications:   ertapenem (INVANZ) IVPB  500 mg Intravenous Daily    famotidine (PF)  20 mg Intravenous Daily    levothyroxine  125 mcg Oral Before breakfast    OXcarbazepine  300 mg Oral QHS    PHENobarbital  64.8 mg Oral Nightly    pravastatin  20 mg Oral Daily      Amino acid 5% - dextrose 15% (CLINIMIX-E) solution with additives (1L  provides 510 kcal/L dextrose, with 50 gm AA, 150 gm CHO, Na 35, K 30, Mg 5, Ca 4.5, Acetate 80, Cl 39, Phos 15) 75 mL/hr at 10/31/19 1747    DOPamine 5 mcg/kg/min (11/01/19 0820)    norepinephrine bitartrate-D5W Stopped (10/31/19 0003)               Physical Exam:  General appearance:NAD  Lungs: diminished breath sounds  Intubated  On 21 % O2  Heart: Pulse 82  Abdomen: soft  Extremities: edema  Skin: dry  Laboratory:  ABG  Labs reviewed  Recent Results (from the past 336 hour(s))   Basic metabolic panel    Collection Time: 11/01/19  4:41 AM   Result Value Ref Range    Sodium 122 (L) 136 - 145 mmol/L    Potassium 4.8 3.5 - 5.1 mmol/L    Chloride 90 (L) 95 - 110 mmol/L    CO2 23 23 - 29 mmol/L    BUN, Bld 83 (H) 8 - 23 mg/dL    Creatinine 3.1 (H) 0.5 - 1.4 mg/dL    Calcium 7.9 (L) 8.7 - 10.5 mg/dL    Anion Gap 9 8 - 16 mmol/L   Basic metabolic panel    Collection Time: 10/31/19  8:19 PM   Result Value Ref Range    Sodium 123 (L) 136 - 145 mmol/L    Potassium 4.9 3.5 - 5.1 mmol/L    Chloride 92 (L) 95 - 110 mmol/L    CO2 24 23 - 29 mmol/L    BUN, Bld 74 (H) 8 - 23 mg/dL    Creatinine 2.8 (H) 0.5 - 1.4 mg/dL    Calcium 7.7 (L) 8.7 - 10.5 mg/dL    Anion Gap 7 (L) 8 - 16 mmol/L   Basic metabolic  panel    Collection Time: 10/31/19  4:45 PM   Result Value Ref Range    Sodium 124 (L) 136 - 145 mmol/L    Potassium 4.5 3.5 - 5.1 mmol/L    Chloride 92 (L) 95 - 110 mmol/L    CO2 23 23 - 29 mmol/L    BUN, Bld 71 (H) 8 - 23 mg/dL    Creatinine 2.7 (H) 0.5 - 1.4 mg/dL    Calcium 7.7 (L) 8.7 - 10.5 mg/dL    Anion Gap 9 8 - 16 mmol/L     Recent Results (from the past 336 hour(s))   CBC with Automated Differential    Collection Time: 11/01/19  4:41 AM   Result Value Ref Range    WBC 6.09 3.90 - 12.70 K/uL    Hemoglobin 6.9 (L) 12.0 - 16.0 g/dL    Hematocrit 19.6 (LL) 37.0 - 48.5 %    Platelets 45 (L) 150 - 350 K/uL   CBC auto differential    Collection Time: 10/31/19 11:12 AM   Result Value Ref Range    WBC 4.34 3.90 - 12.70 K/uL    Hemoglobin 7.3 (L) 12.0 - 16.0 g/dL    Hematocrit 21.1 (L) 37.0 - 48.5 %    Platelets 41 (L) 150 - 350 K/uL   CBC with Automated Differential    Collection Time: 10/31/19  4:10 AM   Result Value Ref Range    WBC 4.36 3.90 - 12.70 K/uL    Hemoglobin 7.4 (L) 12.0 - 16.0 g/dL    Hematocrit 21.8 (L) 37.0 - 48.5 %    Platelets 48 (L) 150 - 350 K/uL     Urinalysis  No results for input(s): COLORU, CLARITYU, SPECGRAV, PHUR, PROTEINUA, GLUCOSEU, BILIRUBINCON, BLOODU, WBCU, RBCU, BACTERIA, MUCUS, NITRITE, LEUKOCYTESUR, UROBILINOGEN, HYALINECASTS in the last 24 hours.    Diagnostic Results:  X-Ray: Reviewed  US: Reviewed  Echo: Reviewed  ACCESS    ASSESSMENT/PLAN:     JAS  UO 1475 cc/24 h  Hyponatremia  Azotemia  Metabolic bone disease  Anemia  Hb 6.9  Type and screen  Transfuse PRBC  Platelets 45  Poor nutrition  Albumin 2.1  Blood pressure 139/63  TPN adjusted  I and O  Weight daily

## 2019-11-01 NOTE — SUBJECTIVE & OBJECTIVE
Interval History: On dopamine.     Review of Systems   Unable to perform ROS: Intubated     Objective:     Vital Signs (Most Recent):  Temp: 98.9 °F (37.2 °C) (11/01/19 0730)  Pulse: 82 (11/01/19 0915)  Resp: 20 (11/01/19 0915)  BP: 139/63 (11/01/19 0915)  SpO2: 100 % (11/01/19 0915) Vital Signs (24h Range):  Temp:  [98.9 °F (37.2 °C)-99.8 °F (37.7 °C)] 98.9 °F (37.2 °C)  Pulse:  [] 82  Resp:  [20-41] 20  SpO2:  [93 %-100 %] 100 %  BP: ()/(46-85) 139/63     Weight: 70.2 kg (154 lb 12.2 oz)  Body mass index is 30.23 kg/m².    Intake/Output Summary (Last 24 hours) at 11/1/2019 1150  Last data filed at 11/1/2019 1100  Gross per 24 hour   Intake 3108.88 ml   Output 2305 ml   Net 803.88 ml      Physical Exam   Constitutional: She appears well-developed. She appears lethargic. No distress. She is intubated.   Cardiovascular: Normal rate and regular rhythm.   Pulmonary/Chest: She is intubated.   Abdominal: Soft. There is no guarding.   Surgical dressing intact   Musculoskeletal: She exhibits edema. She exhibits no tenderness.   Neurological: She appears lethargic.   Nursing note and vitals reviewed.      Significant Labs: All pertinent labs within the past 24 hours have been reviewed.   Recent Labs   Lab 10/30/19  1510  10/31/19  0410  10/31/19  1645 10/31/19  2019 11/01/19  0441   *   < > 125*   < > 124* 123* 122*  122*   K 4.3   < > 4.1   < > 4.5 4.9 4.8  4.8   CL 92*   < > 92*   < > 92* 92* 90*  90*   CO2 22*   < > 24   < > 23 24 23  23   BUN 55*   < > 62*   < > 71* 74* 83*  83*   CREATININE 2.4*   < > 2.5*   < > 2.7* 2.8* 3.1*  3.1*   CALCIUM 7.1*   < > 7.6*   < > 7.7* 7.7* 7.9*  7.9*   PROT 3.9*  --  4.5*  --   --   --  4.7*   BILITOT 1.0  --  1.0  --   --   --  1.0   ALKPHOS 40*  --  45*  --   --   --  53*   ALT 46*  --  39  --   --   --  29   AST 61*  --  62*  --   --   --  59*    < > = values in this interval not displayed.         Significant Imaging: I have reviewed all pertinent imaging  results/findings within the past 24 hours.

## 2019-11-01 NOTE — PLAN OF CARE
POD # 3 s/p sigmoid colectomy with end colostomy   Pt remains intubated and also has TVP now; no family currently at bedside.    TN to continue to follow.     11/01/19 1045   Discharge Reassessment   Assessment Type Discharge Planning Reassessment

## 2019-11-02 PROBLEM — R65.21 E. COLI SEPTIC SHOCK: Status: RESOLVED | Noted: 2019-10-28 | Resolved: 2019-11-02

## 2019-11-02 PROBLEM — R74.8 ABNORMAL LIVER ENZYMES: Status: RESOLVED | Noted: 2019-10-28 | Resolved: 2019-11-02

## 2019-11-02 PROBLEM — A41.51 E. COLI SEPTIC SHOCK: Status: RESOLVED | Noted: 2019-10-28 | Resolved: 2019-11-02

## 2019-11-02 LAB
ALBUMIN SERPL BCP-MCNC: 1.8 G/DL (ref 3.5–5.2)
ALBUMIN SERPL BCP-MCNC: 1.9 G/DL (ref 3.5–5.2)
ALP SERPL-CCNC: 79 U/L (ref 55–135)
ALT SERPL W/O P-5'-P-CCNC: 22 U/L (ref 10–44)
ANION GAP SERPL CALC-SCNC: 10 MMOL/L (ref 8–16)
ANION GAP SERPL CALC-SCNC: 9 MMOL/L (ref 8–16)
ANISOCYTOSIS BLD QL SMEAR: SLIGHT
AST SERPL-CCNC: 51 U/L (ref 10–40)
BASOPHILS # BLD AUTO: 0.01 K/UL (ref 0–0.2)
BASOPHILS # BLD AUTO: ABNORMAL K/UL (ref 0–0.2)
BASOPHILS NFR BLD: 0 % (ref 0–1.9)
BASOPHILS NFR BLD: 0.1 % (ref 0–1.9)
BILIRUB SERPL-MCNC: 0.9 MG/DL (ref 0.1–1)
BUN SERPL-MCNC: 54 MG/DL (ref 8–23)
BUN SERPL-MCNC: 54 MG/DL (ref 8–23)
BUN SERPL-MCNC: 60 MG/DL (ref 8–23)
BUN SERPL-MCNC: 86 MG/DL (ref 8–23)
BUN SERPL-MCNC: 86 MG/DL (ref 8–23)
CALCIUM SERPL-MCNC: 7.3 MG/DL (ref 8.7–10.5)
CALCIUM SERPL-MCNC: 7.3 MG/DL (ref 8.7–10.5)
CALCIUM SERPL-MCNC: 7.7 MG/DL (ref 8.7–10.5)
CALCIUM SERPL-MCNC: 7.7 MG/DL (ref 8.7–10.5)
CALCIUM SERPL-MCNC: 7.8 MG/DL (ref 8.7–10.5)
CHLORIDE SERPL-SCNC: 102 MMOL/L (ref 95–110)
CHLORIDE SERPL-SCNC: 102 MMOL/L (ref 95–110)
CHLORIDE SERPL-SCNC: 98 MMOL/L (ref 95–110)
CHLORIDE SERPL-SCNC: 98 MMOL/L (ref 95–110)
CHLORIDE SERPL-SCNC: 99 MMOL/L (ref 95–110)
CO2 SERPL-SCNC: 20 MMOL/L (ref 23–29)
CO2 SERPL-SCNC: 20 MMOL/L (ref 23–29)
CO2 SERPL-SCNC: 23 MMOL/L (ref 23–29)
CREAT SERPL-MCNC: 1.6 MG/DL (ref 0.5–1.4)
CREAT SERPL-MCNC: 1.6 MG/DL (ref 0.5–1.4)
CREAT SERPL-MCNC: 1.7 MG/DL (ref 0.5–1.4)
CREAT SERPL-MCNC: 2.7 MG/DL (ref 0.5–1.4)
CREAT SERPL-MCNC: 2.7 MG/DL (ref 0.5–1.4)
DIFFERENTIAL METHOD: ABNORMAL
DIFFERENTIAL METHOD: ABNORMAL
EOSINOPHIL # BLD AUTO: 0.1 K/UL (ref 0–0.5)
EOSINOPHIL # BLD AUTO: ABNORMAL K/UL (ref 0–0.5)
EOSINOPHIL NFR BLD: 0 % (ref 0–8)
EOSINOPHIL NFR BLD: 1 % (ref 0–8)
ERYTHROCYTE [DISTWIDTH] IN BLOOD BY AUTOMATED COUNT: 14.3 % (ref 11.5–14.5)
ERYTHROCYTE [DISTWIDTH] IN BLOOD BY AUTOMATED COUNT: 14.5 % (ref 11.5–14.5)
EST. GFR  (AFRICAN AMERICAN): 18 ML/MIN/1.73 M^2
EST. GFR  (AFRICAN AMERICAN): 18 ML/MIN/1.73 M^2
EST. GFR  (AFRICAN AMERICAN): 32 ML/MIN/1.73 M^2
EST. GFR  (AFRICAN AMERICAN): 34 ML/MIN/1.73 M^2
EST. GFR  (AFRICAN AMERICAN): 34 ML/MIN/1.73 M^2
EST. GFR  (NON AFRICAN AMERICAN): 16 ML/MIN/1.73 M^2
EST. GFR  (NON AFRICAN AMERICAN): 16 ML/MIN/1.73 M^2
EST. GFR  (NON AFRICAN AMERICAN): 28 ML/MIN/1.73 M^2
EST. GFR  (NON AFRICAN AMERICAN): 30 ML/MIN/1.73 M^2
EST. GFR  (NON AFRICAN AMERICAN): 30 ML/MIN/1.73 M^2
GLUCOSE SERPL-MCNC: 198 MG/DL (ref 70–110)
GLUCOSE SERPL-MCNC: 198 MG/DL (ref 70–110)
GLUCOSE SERPL-MCNC: 214 MG/DL (ref 70–110)
GLUCOSE SERPL-MCNC: 214 MG/DL (ref 70–110)
GLUCOSE SERPL-MCNC: 223 MG/DL (ref 70–110)
HCT VFR BLD AUTO: 24 % (ref 37–48.5)
HCT VFR BLD AUTO: 24.6 % (ref 37–48.5)
HGB BLD-MCNC: 8.3 G/DL (ref 12–16)
HGB BLD-MCNC: 8.3 G/DL (ref 12–16)
HYPOCHROMIA BLD QL SMEAR: ABNORMAL
LYMPHOCYTES # BLD AUTO: 0.6 K/UL (ref 1–4.8)
LYMPHOCYTES # BLD AUTO: ABNORMAL K/UL (ref 1–4.8)
LYMPHOCYTES NFR BLD: 3 % (ref 18–48)
LYMPHOCYTES NFR BLD: 5.5 % (ref 18–48)
MAGNESIUM SERPL-MCNC: 2.2 MG/DL (ref 1.6–2.6)
MAGNESIUM SERPL-MCNC: 2.4 MG/DL (ref 1.6–2.6)
MAGNESIUM SERPL-MCNC: 2.6 MG/DL (ref 1.6–2.6)
MAGNESIUM SERPL-MCNC: 2.9 MG/DL (ref 1.6–2.6)
MCH RBC QN AUTO: 32.2 PG (ref 27–31)
MCH RBC QN AUTO: 33.1 PG (ref 27–31)
MCHC RBC AUTO-ENTMCNC: 33.7 G/DL (ref 32–36)
MCHC RBC AUTO-ENTMCNC: 34.6 G/DL (ref 32–36)
MCV RBC AUTO: 95 FL (ref 82–98)
MCV RBC AUTO: 96 FL (ref 82–98)
MONOCYTES # BLD AUTO: 0.3 K/UL (ref 0.3–1)
MONOCYTES # BLD AUTO: ABNORMAL K/UL (ref 0.3–1)
MONOCYTES NFR BLD: 2 % (ref 4–15)
MONOCYTES NFR BLD: 2.9 % (ref 4–15)
NEUTROPHILS # BLD AUTO: 9.1 K/UL (ref 1.8–7.7)
NEUTROPHILS NFR BLD: 82 % (ref 38–73)
NEUTROPHILS NFR BLD: 90.5 % (ref 38–73)
NEUTS BAND NFR BLD MANUAL: 13 %
PHOSPHATE SERPL-MCNC: 2.4 MG/DL (ref 2.7–4.5)
PHOSPHATE SERPL-MCNC: 2.5 MG/DL (ref 2.7–4.5)
PHOSPHATE SERPL-MCNC: 2.5 MG/DL (ref 2.7–4.5)
PHOSPHATE SERPL-MCNC: 3.1 MG/DL (ref 2.7–4.5)
PHOSPHATE SERPL-MCNC: 3.1 MG/DL (ref 2.7–4.5)
PHOSPHATE SERPL-MCNC: 3.2 MG/DL (ref 2.7–4.5)
PLATELET # BLD AUTO: 41 K/UL (ref 150–350)
PLATELET # BLD AUTO: 47 K/UL (ref 150–350)
PLATELET BLD QL SMEAR: ABNORMAL
PMV BLD AUTO: 11.7 FL (ref 9.2–12.9)
PMV BLD AUTO: 12.8 FL (ref 9.2–12.9)
POIKILOCYTOSIS BLD QL SMEAR: SLIGHT
POTASSIUM SERPL-SCNC: 3.8 MMOL/L (ref 3.5–5.1)
POTASSIUM SERPL-SCNC: 4.3 MMOL/L (ref 3.5–5.1)
POTASSIUM SERPL-SCNC: 4.3 MMOL/L (ref 3.5–5.1)
PROT SERPL-MCNC: 4.5 G/DL (ref 6–8.4)
RBC # BLD AUTO: 2.51 M/UL (ref 4–5.4)
RBC # BLD AUTO: 2.58 M/UL (ref 4–5.4)
SODIUM SERPL-SCNC: 127 MMOL/L (ref 136–145)
SODIUM SERPL-SCNC: 127 MMOL/L (ref 136–145)
SODIUM SERPL-SCNC: 132 MMOL/L (ref 136–145)
SODIUM SERPL-SCNC: 134 MMOL/L (ref 136–145)
SODIUM SERPL-SCNC: 134 MMOL/L (ref 136–145)
VANCOMYCIN SERPL-MCNC: 17.2 UG/ML
WBC # BLD AUTO: 10.26 K/UL (ref 3.9–12.7)
WBC # BLD AUTO: 10.84 K/UL (ref 3.9–12.7)

## 2019-11-02 PROCEDURE — 99233 PR SUBSEQUENT HOSPITAL CARE,LEVL III: ICD-10-PCS | Mod: ,,, | Performed by: INTERNAL MEDICINE

## 2019-11-02 PROCEDURE — 80202 ASSAY OF VANCOMYCIN: CPT

## 2019-11-02 PROCEDURE — S0171 BUMETANIDE 0.5 MG: HCPCS | Performed by: INTERNAL MEDICINE

## 2019-11-02 PROCEDURE — 25000003 PHARM REV CODE 250: Performed by: EMERGENCY MEDICINE

## 2019-11-02 PROCEDURE — A4217 STERILE WATER/SALINE, 500 ML: HCPCS | Performed by: HOSPITALIST

## 2019-11-02 PROCEDURE — 90945 DIALYSIS ONE EVALUATION: CPT

## 2019-11-02 PROCEDURE — 84100 ASSAY OF PHOSPHORUS: CPT

## 2019-11-02 PROCEDURE — 86706 HEP B SURFACE ANTIBODY: CPT

## 2019-11-02 PROCEDURE — 83735 ASSAY OF MAGNESIUM: CPT | Mod: 91

## 2019-11-02 PROCEDURE — 63600175 PHARM REV CODE 636 W HCPCS

## 2019-11-02 PROCEDURE — 85025 COMPLETE CBC W/AUTO DIFF WBC: CPT

## 2019-11-02 PROCEDURE — S0028 INJECTION, FAMOTIDINE, 20 MG: HCPCS | Performed by: HOSPITALIST

## 2019-11-02 PROCEDURE — 94003 VENT MGMT INPAT SUBQ DAY: CPT

## 2019-11-02 PROCEDURE — 25000003 PHARM REV CODE 250: Performed by: HOSPITALIST

## 2019-11-02 PROCEDURE — 25000003 PHARM REV CODE 250: Performed by: NURSE PRACTITIONER

## 2019-11-02 PROCEDURE — 80053 COMPREHEN METABOLIC PANEL: CPT

## 2019-11-02 PROCEDURE — 36430 TRANSFUSION BLD/BLD COMPNT: CPT

## 2019-11-02 PROCEDURE — 25000003 PHARM REV CODE 250: Performed by: INTERNAL MEDICINE

## 2019-11-02 PROCEDURE — 20000000 HC ICU ROOM

## 2019-11-02 PROCEDURE — 80069 RENAL FUNCTION PANEL: CPT | Mod: 91

## 2019-11-02 PROCEDURE — 80100008 HC CRRT DAILY MAINTENANCE

## 2019-11-02 PROCEDURE — 94761 N-INVAS EAR/PLS OXIMETRY MLT: CPT

## 2019-11-02 PROCEDURE — 85027 COMPLETE CBC AUTOMATED: CPT

## 2019-11-02 PROCEDURE — 36556 INSERT NON-TUNNEL CV CATH: CPT

## 2019-11-02 PROCEDURE — 27202415 HC CARTRIDGE, CRRT

## 2019-11-02 PROCEDURE — 99233 SBSQ HOSP IP/OBS HIGH 50: CPT | Mod: ,,, | Performed by: INTERNAL MEDICINE

## 2019-11-02 PROCEDURE — 63600175 PHARM REV CODE 636 W HCPCS: Performed by: INTERNAL MEDICINE

## 2019-11-02 PROCEDURE — 85007 BL SMEAR W/DIFF WBC COUNT: CPT

## 2019-11-02 PROCEDURE — 27000221 HC OXYGEN, UP TO 24 HOURS

## 2019-11-02 PROCEDURE — 63600175 PHARM REV CODE 636 W HCPCS: Performed by: HOSPITALIST

## 2019-11-02 PROCEDURE — 63600175 PHARM REV CODE 636 W HCPCS: Performed by: STUDENT IN AN ORGANIZED HEALTH CARE EDUCATION/TRAINING PROGRAM

## 2019-11-02 RX ORDER — HEPARIN SODIUM 5000 [USP'U]/ML
5000 INJECTION, SOLUTION INTRAVENOUS; SUBCUTANEOUS EVERY 8 HOURS
Status: DISCONTINUED | OUTPATIENT
Start: 2019-11-02 | End: 2019-11-12 | Stop reason: HOSPADM

## 2019-11-02 RX ORDER — HEPARIN 100 UNIT/ML
1000 SYRINGE INTRAVENOUS
Status: DISCONTINUED | OUTPATIENT
Start: 2019-11-02 | End: 2019-11-12 | Stop reason: HOSPADM

## 2019-11-02 RX ORDER — HEPARIN SODIUM 1000 [USP'U]/ML
INJECTION, SOLUTION INTRAVENOUS; SUBCUTANEOUS
Status: COMPLETED
Start: 2019-11-02 | End: 2019-11-02

## 2019-11-02 RX ORDER — VANCOMYCIN HCL IN 5 % DEXTROSE 1G/250ML
1000 PLASTIC BAG, INJECTION (ML) INTRAVENOUS ONCE
Status: DISCONTINUED | OUTPATIENT
Start: 2019-11-02 | End: 2019-11-02

## 2019-11-02 RX ADMIN — FAMOTIDINE 20 MG: 10 INJECTION, SOLUTION INTRAVENOUS at 09:11

## 2019-11-02 RX ADMIN — PRAVASTATIN SODIUM 20 MG: 20 TABLET ORAL at 09:11

## 2019-11-02 RX ADMIN — CALCIUM GLUCONATE: 98 INJECTION, SOLUTION INTRAVENOUS at 05:11

## 2019-11-02 RX ADMIN — HEPARIN SODIUM 1000 UNITS: 1000 INJECTION, SOLUTION INTRAVENOUS; SUBCUTANEOUS at 09:11

## 2019-11-02 RX ADMIN — BUMETANIDE 2 MG: 0.25 INJECTION INTRAMUSCULAR; INTRAVENOUS at 09:11

## 2019-11-02 RX ADMIN — SODIUM PHOSPHATE, MONOBASIC, MONOHYDRATE 15 MMOL: 276; 142 INJECTION, SOLUTION INTRAVENOUS at 10:11

## 2019-11-02 RX ADMIN — HEPARIN SODIUM 5000 UNITS: 5000 INJECTION, SOLUTION INTRAVENOUS; SUBCUTANEOUS at 09:11

## 2019-11-02 RX ADMIN — OXCARBAZEPINE 300 MG: 150 TABLET, FILM COATED ORAL at 06:11

## 2019-11-02 RX ADMIN — PHENOBARBITAL 64.8 MG: 32.4 TABLET ORAL at 06:11

## 2019-11-02 RX ADMIN — LEVOTHYROXINE SODIUM 125 MCG: 25 TABLET ORAL at 05:11

## 2019-11-02 RX ADMIN — ERTAPENEM SODIUM 0.5 G: 1 INJECTION, POWDER, LYOPHILIZED, FOR SOLUTION INTRAMUSCULAR; INTRAVENOUS at 09:11

## 2019-11-02 NOTE — PROGRESS NOTES
[facility]  Progress Note    Patient Name: Annette Garcia  MRN: [id number]  Patient Class: IP- Inpatient   Admission Date: 10/27/2019  Length of Stay: 5 days  Attending Physician: José Manuel Guidry MD  Primary Care Provider: Jose Valles MD        Subjective:     Principal Problem:Colon necrosis        HPI:  Annette Garcia is an 84 year old white woman with peripheral vascular disease, renovascular hypertension, renal artery stenosis status post left renal artery stent placement on 7/19/17, coronary artery disease with history of myocardial infarction, diffuse large B cell lymphoma, anemia, epilepsy, hypothyroidism, chronic hyponatremia, depression, lumbar and sacroiliac joint osteoarthritis with chronic low back pain and history of lumbar spine surgery in 2013, slow transit constipation, history of appendectomy, history of cholecystectomy history hysterectomy, history of small bowel obstruction status post small bowel resection on 8/23/16. She lives in [place], [place]. Her son has epilepsy. Her daughter has tuberous sclerosis. Her primary care physician is Dr. Jose Torres. Her pain management specialist is Dr. Chirag Bates. Her neurologist is Dr. Gamal Lock.    She presented to [facility] on 10/27/19 with abdominal pain, difficulty urinating, and constipation for one day. She took polyethylene glycol and senna-docusate without relief. She strained during her last attempt at having a bowel movement. She had one episode of vomiting on the day of presentation. She felt weak when walking. In the emergency department, she was found to have acute kidney injury (BUN 27, creatinine 1.6, from 9 and 0.7 on 8/29/19), elevated transaminases ( and , from 16 and 11 on 8/29/19), and lactic acidosis (5.8). Contrast CT showed acute enteritis, sigmoid and rectal constipation, and some peritoneal free fluid in the pericolic  magdy.she was given lactated ringers, cefepime, and metronidazole. She required norepinephrine for septic shock. She was admitted to [facility].    Overview/Hospital Course:  Blood cultures grew pan-sensitive Escherichia coli. Due to history of bowel resection for obstruction, General Surgery was consulted for her bowel obstruction. She was put on amikacin and cefepime. 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 She was kept intubated postoperatively. Hospital Medicine changed amikacin to metronidazole. She became oliguric but responded well to a dose of furosemide. Despite this, hyponatremia worsened. She developed thrombocytopenia. She developed atrial fibrillation and sinus pauses, so Cardiology was consulted and placed a temporary pacemaker. Echocardiogram only showed concentric remodeling and mild mitral regurgitation. Norepinephrine was changed to dopamine. EEG showed triphasic waves bilaterally consistent with metabolic encephalopathy. The epileptologist recommended treating her hyponatremia and checking levels of her oxcarbazepine and phenobarbital. She required pRBC transfusion. Hyponatremia improved and she became alert. She was weaned off vasopressors and was extubated the morning of 11/2/19.    Interval History: Extubated, talking.    Review of Systems   Constitutional: Negative for chills and fever.   Gastrointestinal: Negative for nausea and vomiting.     Objective:     Vital Signs (Most Recent):  Temp: 96.4 °F (35.8 °C) (11/02/19 1115)  Pulse: 77 (11/02/19 1315)  Resp: (!) 24 (11/02/19 1315)  BP: (!) 140/66 (11/02/19 1315)  SpO2: 100 % (11/02/19 1315) Vital Signs (24h Range):  Temp:  [96.4 °F (35.8 °C)-98.4 °F (36.9 °C)] 96.4 °F (35.8 °C)  Pulse:  [] 77  Resp:  [14-44] 24  SpO2:  [92 %-100 %] 100 %  BP: ()/(35-72) 140/66     Weight: 70.6 kg (155 lb 10.3 oz)  Body mass index is 30.4  kg/m².    Intake/Output Summary (Last 24 hours) at 11/2/2019 1428  Last data filed at 11/2/2019 1300  Gross per 24 hour   Intake 2117.62 ml   Output 3461 ml   Net -1343.38 ml      Physical Exam   Constitutional: She is oriented to person, place, and time. She appears well-developed. No distress.   Pulmonary/Chest: Effort normal. No respiratory distress.   Neurological: She is alert and oriented to person, place, and time.   Psychiatric: She has a normal mood and affect.   Nursing note and vitals reviewed.      Significant Labs: All pertinent labs within the past 24 hours have been reviewed.    Significant Imaging: I have reviewed all pertinent imaging results/findings within the past 24 hours.      Assessment/Plan:      * Colon necrosis  E. coli bacteremia  Atrial fibrillation  Acute tubular necrosis  Sinus pauses  DIC (disseminated intravascular coagulation)  On meropenem. Status post sigmoidectomy. Multi-organ failure with signs of improvement. Temporary pacemaker on. Appreciate General Surgery, Pulmonology, Cardiology, Nephrology.    Anemia of acute infection  Transfused pRBCs.    Slow transit constipation  Takes polyethylene glycol at home.    Peripheral vascular disease, unspecified  Hyperlipidemia   Takes pravastatin.    Old MI (myocardial infarction)  Takes pravastatin.    Depression  Continue home mirtazapine 7.5mg HS.    Anemia due to antineoplastic chemotherapy  Stable.    Chronic hyponatremia  Nephrology changed fluids in medications to normal saline. Improving toward baseline.    DLBCL (diffuse large B cell lymphoma)  Follow up outpatient.    Acquired hypothyroidism  Continue home levothyroxine 125 mcg before breakfast.    Epilepsy  Continue home oxycarbazepine 300 mg nightly, phenobarbital 64.8 mg nightly.    Renovascular hypertension  Hold home lisinopril, clonidine, carvedilol.      VTE Risk Mitigation (From admission, onward)         Ordered     IP VTE LOW RISK PATIENT  Once      10/30/19 0811      Place sequential compression device  Until discontinued      10/28/19 0607                Critical care time spent on the evaluation and treatment of severe organ dysfunction, review of pertinent labs and imaging studies, discussions with consulting providers and discussions with patient/family: 35 minutes.      José Manuel Guidry MD  Department of Hospital Medicine   [facility]

## 2019-11-02 NOTE — PROGRESS NOTES
Pulmonary & Critical Care Medicine Progress Note    Subjective:   No overnight events. Following all commands. Passed SBT this morning. Family visited this morning     Vital Signs:   Vitals:    11/02/19 1130   BP: (!) 151/61   Pulse: 75   Resp: (!) 24   Temp:      Fluid Balance:     Intake/Output Summary (Last 24 hours) at 11/2/2019 1249  Last data filed at 11/2/2019 1200  Gross per 24 hour   Intake 2467.62 ml   Output 3436 ml   Net -968.38 ml     Physical Exam:   General: Lying down comfortably, Following all commands  HEENT: NC/AT, PERRL, moist mucous membranes   Neck: Supple, RIJ trialysis line in place  Cardiac: S1S2 auscultated, RRR, no murmurs  Resp: Auscultation clear bilaterally with no increased work of breathing. Good inspiratory effort. No wheezes/rhonchi/crackles.  Abd: Soft, midline incision, colostomy in place  Ext: No edema, no cyanosis, 2+ pulses present  Skin: Warm and dry, no rashes, no lesions  Neuro: AAOx3, CN II-XII grossly intact, no focal deficits    Laboratory Studies:   No results for input(s): PH, PCO2, PO2, HCO3, POCSATURATED, BE in the last 24 hours.  Recent Labs   Lab 11/02/19  0430   WBC 10.84   RBC 2.51*   HGB 8.3*   HCT 24.0*   PLT 41*   MCV 96   MCH 33.1*   MCHC 34.6     Recent Labs   Lab 11/02/19  0430 11/02/19  0834   *  127*  --    K 4.3  4.3  --    CL 98  98  --    CO2 20*  20*  --    BUN 86*  86*  --    CREATININE 2.7*  2.7*  --    MG 2.9* 2.6     Microbiology Data:   Microbiology Results (last 7 days)     Procedure Component Value Units Date/Time    Blood culture [[id number]] Collected:  11/01/19 1335    Order Status:  Completed Specimen:  Blood Updated:  11/02/19 0115     Blood Culture, Routine No Growth to date    Blood culture [[id number]] Collected:  11/01/19 1516    Order Status:  Completed Specimen:  Blood Updated:  11/02/19 0115     Blood Culture, Routine No Growth to date    Narrative:       Collection has been rescheduled by AC2 at 11/01/2019 14:23 Reason: pt    recieving unit  Collection has been rescheduled by AC2 at 11/01/2019 14:23 Reason: pt   recieving unit    Blood culture [[id number]]     Order Status:  Canceled Specimen:  Blood     Blood culture [[id number]]     Order Status:  Canceled Specimen:  Blood     Blood Culture #1 **CANNOT BE ORDERED STAT** [[id number]]  (Abnormal)  (Susceptibility) Collected:  10/28/19 0049    Order Status:  Completed Specimen:  Blood from Peripheral, Right  Wrist Updated:  10/30/19 1024     Blood Culture, Routine Gram stain aer bottle: Gram negative rods       Results called to and read back by: Kinga Bateman RN  10/28/2019  19:01      ESCHERICHIA COLI    Blood Culture #2 **CANNOT BE ORDERED STAT** [[id number]] Collected:  10/28/19 0049    Order Status:  Sent Specimen:  Blood from Peripheral, Hand, Left Updated:  10/28/19 0050         Chest Imaging:   No new imaging.     Infusions:     sodium chloride 0.9%      TPN ADULT CENTRAL LINE CUSTOM 75 mL/hr at 11/01/19 1737    TPN ADULT CENTRAL LINE CUSTOM       Scheduled Medications:    bumetanide  2 mg Intravenous Daily    ertapenem (INVANZ) IVPB  500 mg Intravenous Daily    famotidine (PF)  20 mg Intravenous Daily    levothyroxine  125 mcg Oral Before breakfast    OXcarbazepine  300 mg Oral QHS    PHENobarbital  64.8 mg Oral Nightly    pravastatin  20 mg Oral Daily    sodium phosphate IVPB  15 mmol Intravenous Once     PRN Medications:   sodium chloride, acetaminophen, albuterol-ipratropium, bumetanide, fentaNYL, magnesium sulfate IVPB, morphine, ondansetron, promethazine (PHENERGAN) IVPB, ramelteon, sodium chloride 0.9%, sodium phosphate IVPB, sodium phosphate IVPB, sodium phosphate IVPB    Assessment & Plan:   Patient Active Problem List   Diagnosis    Renovascular hypertension    Epilepsy    Acquired hypothyroidism    Osteoarthritis of lumbar spine    Degenerative joint disease of sacroiliac joint    S/P exploratory laparotomy    DLBCL (diffuse large B cell lymphoma)     Chronic hyponatremia    Anemia due to antineoplastic chemotherapy    Bilateral renal artery stenosis    Closed comminuted fracture of right humerus    Closed displaced fracture of distal phalanx of right little finger    Age-related osteoporosis with current pathological fracture with routine healing    Closed wedge compression fracture of eleventh thoracic vertebra with routine healing    DDD (degenerative disc disease), lumbar    Chronic bilateral low back pain without sciatica    Age-related osteoporosis without current pathological fracture    Vitamin D deficiency    Uncontrolled hypertension    Subjective memory complaints    Depression    E. coli septic shock    Abnormal liver enzymes    Old MI (myocardial infarction)    Peripheral vascular disease, unspecified    History of stent insertion of left renal artery 7/19/17    Colon necrosis    Slow transit constipation    Fecal obstruction    E coli bacteremia    Abdominal pain    Paroxysmal atrial fibrillation    Sinus pause    DIC (disseminated intravascular coagulation)    Anemia of acute infection    Thrombocytopenia    Enteritis    Lactic acidosis     1. E.coli septic shock  2. Colonic ischemia/necrosis s/p resection and colostomy  3. Hypoxic respiratory failure  4. Fluid overload  5. Goals of care    Ms. Garcia is an 85 yo F with a PMH of hypothyroidism, seizure disorder, and HTN who presented to [facility] on 10/27 for E.coli septic shock s/p sigmoid colectomy for bowel necrosis. Patient was intubated for airway protection and hypoxic respiratory failure and started on CRRT for anuric renal failure. Patient was extubated today, 11/2, without difficulty. She is saturating well on RA, follows commands, and is conversant with family members.    1. Patient has been extubated to room air. Patient is doing well in regards to respiratory mechanics.  2. Continue Ertapenem for Ecoli sepsis. ID on board.  3. Anuric renal failure  improving on CRRT. Patient's function is improving with patient making 50 cc/hr urine today. Consider increasing her UF slightly today. Renal on board.   4. Recommend discontinuing TPN and starting tube feeds when surgery feels appropriate.  5. Patient is not on any DVT ppx. Starting chemical ppx with Heparin 5000 q8h.  6. Had a long discussion with family today regarding DNR and DNI status. They do not chest compressions done and do not want re-intubation per patient's living will. DNR/DNI status changed in the chart.    DVT ppx: SCDs  Code status: DNR/DNI    Thank you for involving us in the care of this patient. We will sign off. Please call with any questions.    Michelle Lozoya MD  [employer] P[facility]M Fellow, PGY 6  [facility]  Pager: [phone]

## 2019-11-02 NOTE — ASSESSMENT & PLAN NOTE
E. coli bacteremia  Atrial fibrillation  Acute tubular necrosis  Sinus pauses  DIC (disseminated intravascular coagulation)  On meropenem. Status post sigmoidectomy. Multi-organ failure with signs of improvement. Temporary pacemaker on. Appreciate General Surgery, Pulmonology, Cardiology, Nephrology.

## 2019-11-02 NOTE — SUBJECTIVE & OBJECTIVE
Interval History: Extubated, talking.    Review of Systems   Constitutional: Negative for chills and fever.   Gastrointestinal: Negative for nausea and vomiting.     Objective:     Vital Signs (Most Recent):  Temp: 96.4 °F (35.8 °C) (11/02/19 1115)  Pulse: 77 (11/02/19 1315)  Resp: (!) 24 (11/02/19 1315)  BP: (!) 140/66 (11/02/19 1315)  SpO2: 100 % (11/02/19 1315) Vital Signs (24h Range):  Temp:  [96.4 °F (35.8 °C)-98.4 °F (36.9 °C)] 96.4 °F (35.8 °C)  Pulse:  [] 77  Resp:  [14-44] 24  SpO2:  [92 %-100 %] 100 %  BP: ()/(35-72) 140/66     Weight: 70.6 kg (155 lb 10.3 oz)  Body mass index is 30.4 kg/m².    Intake/Output Summary (Last 24 hours) at 11/2/2019 1428  Last data filed at 11/2/2019 1300  Gross per 24 hour   Intake 2117.62 ml   Output 3461 ml   Net -1343.38 ml      Physical Exam   Constitutional: She is oriented to person, place, and time. She appears well-developed. No distress.   Pulmonary/Chest: Effort normal. No respiratory distress.   Neurological: She is alert and oriented to person, place, and time.   Psychiatric: She has a normal mood and affect.   Nursing note and vitals reviewed.      Significant Labs: All pertinent labs within the past 24 hours have been reviewed.    Significant Imaging: I have reviewed all pertinent imaging results/findings within the past 24 hours.

## 2019-11-02 NOTE — PROGRESS NOTES
[facility]  Cardiology  Progress Note    Patient Name: Annette Garcia  MRN: [id number]  Admission Date: 10/27/2019  Hospital Length of Stay: 5 days  Code Status: Full Code   Attending Physician: José Manuel Guidry MD   Primary Care Physician: Jose Valles MD  Expected Discharge Date:   Principal Problem:Colon necrosis    Subjective:     Hospital Course:   10/27/2019-10/29/2019 Per HPI. Underwent exp lap per General surgery with sigmoidectomy with colostomy with large amounts of turbid, murky fluid in abdomen. Admitted to ICU and remained vented post operatively with continued pressor use. Placed on TPN. Blood cultures with 2 out of 4 GNR resembling Ecoli on Invanz and Vancomycin   10/30/2019 Cardiology consulted STAT due to presence of pauses with longest pause of 11 seconds with ROSC. BP stable on IV Levophed. TCP at rate of 100 and transitioned to IV Dopamine. Emergent TVP in cath lab   10/31/2019 H&H down to 7.4&21.8 this AM. BUN 62 creatinine 2.5 K+ 4.1 Mg 3.7 down from 4.1. TVP in place with intermittent V paced rhythm. Remains intubated. BP marginal on IV Dopamine  11/1/2019 Remains intubated and on IV Dopamine with increased requirements this AM due to MAP less than 60. TVP remains in place with NSR HR 80 currently. Episode overnight of V paced rhythm-will decrease TVP to 70bpm today.  K+ 4.8 Mg 3.6 BUN 83 creatinine 3.1. Minimal response with no sedative medications on board      11/2/2019  Overnight received PRBC transfusion with appropriate response. Tolerating CRRT well. No events on tele. Intermittent afib. Currently on SBT.        Review of Systems   Unable to perform ROS: intubated     ROS  Objective:     Vital Signs (Most Recent):  Temp: 96.5 °F (35.8 °C) (11/02/19 0730)  Pulse: 77 (11/02/19 1100)  Resp: (!) 26 (11/02/19 1100)  BP: 139/67 (11/02/19 1100)  SpO2: 100 % (11/02/19 1100) Vital Signs (24h Range):  Temp:  [96.5 °F (35.8 °C)-98.4 °F (36.9 °C)] 96.5 °F (35.8  °C)  Pulse:  [] 77  Resp:  [14-44] 26  SpO2:  [92 %-100 %] 100 %  BP: ()/(35-72) 139/67     Weight: 70.6 kg (155 lb 10.3 oz)  Body mass index is 30.4 kg/m².    SpO2: 100 %  O2 Device (Oxygen Therapy): room air      Intake/Output Summary (Last 24 hours) at 11/2/2019 1109  Last data filed at 11/2/2019 1021  Gross per 24 hour   Intake 2467.62 ml   Output 3136 ml   Net -668.38 ml       Lines/Drains/Airways     Central Venous Catheter Line                 Port A Cath Single Lumen right subclavian -- days         Trialysis (Dialysis) Catheter 11/01/19 1708 right internal jugular less than 1 day          Drain                 Urethral Catheter 10/28/19  Non-latex 16 Fr. 5 days         NG/OG Tube 10/28/19 1630 [place] sump Left nostril 4 days         Colostomy 10/29/19 1200 LLQ 3 days          Airway                 Airway - Non-Surgical 10/29/19 1015 Endotracheal Tube 4 days                Physical Exam    Constitutional: She has a sickly appearance. She appears ill. She is intubated.   Cardiovascular: Normal rate and regular rhythm.   Pulmonary/Chest: Effort normal. She is intubated. She has decreased breath sounds.   Significant Labs:   ABG: No results for input(s): PH, PCO2, HCO3, POCSATURATED, BE in the last 48 hours., Blood Culture:   Recent Labs   Lab 11/01/19  1335 11/01/19  1516   LABBLOO No Growth to date No Growth to date   , BMP:   Recent Labs   Lab 11/01/19  0441 11/01/19  1454 11/02/19  0430 11/02/19  0834   *  168* 97 214*  214*  --    *  122* 122* 127*  127*  --    K 4.8  4.8 4.9 4.3  4.3  --    CL 90*  90* 90* 98  98  --    CO2 23  23 22* 20*  20*  --    BUN 83*  83* 96* 86*  86*  --    CREATININE 3.1*  3.1* 3.1* 2.7*  2.7*  --    CALCIUM 7.9*  7.9* 8.0* 7.3*  7.3*  --    MG 3.6*  --  2.9* 2.6   , CMP   Recent Labs   Lab 11/01/19  0441 11/01/19  1454 11/02/19  0430   *  122* 122* 127*  127*   K 4.8  4.8 4.9 4.3  4.3   CL 90*  90* 90* 98  98   CO2 23  23  22* 20*  20*   *  168* 97 214*  214*   BUN 83*  83* 96* 86*  86*   CREATININE 3.1*  3.1* 3.1* 2.7*  2.7*   CALCIUM 7.9*  7.9* 8.0* 7.3*  7.3*   PROT 4.7*  --  4.5*   ALBUMIN 2.1*  --  1.8*   BILITOT 1.0  --  0.9   ALKPHOS 53*  --  79   AST 59*  --  51*   ALT 29  --  22   ANIONGAP 9  9 10 9  9   ESTGFRAFRICA 15*  15* 15* 18*  18*   EGFRNONAA 13*  13* 13* 16*  16*   , CBC   Recent Labs   Lab 10/31/19  1112 11/01/19  0441 11/02/19  0430   WBC 4.34 6.09 10.84   HGB 7.3* 6.9* 8.3*   HCT 21.1* 19.6* 24.0*   PLT 41* 45* 41*   , INR No results for input(s): INR, PROTIME in the last 48 hours., Lipid Panel No results for input(s): CHOL, HDL, LDLCALC, TRIG, CHOLHDL in the last 48 hours. and Troponin No results for input(s): TROPONINI in the last 48 hours.      Assessment and Plan:         Active Diagnoses:    Diagnosis Date Noted POA    PRINCIPAL PROBLEM:  Colon necrosis [K55.049] 10/27/2019 Yes    Anemia of acute infection [D64.9] 11/01/2019 No    Thrombocytopenia [D69.6] 11/01/2019 No    Enteritis [K52.9]  Yes    Lactic acidosis [E87.2]  Yes    Paroxysmal atrial fibrillation [I48.0] 10/30/2019 Yes    Sinus pause [I45.5] 10/30/2019 No    E coli bacteremia [R78.81] 10/29/2019 Yes    Abdominal pain [R10.9] 10/29/2019 Unknown    DIC (disseminated intravascular coagulation) [D65] 10/29/2019 Yes    E. coli septic shock [A41.51, R65.21] 10/28/2019 Yes    Abnormal liver enzymes [R74.8] 10/28/2019 Yes    Old MI (myocardial infarction) [I25.2] 10/28/2019 Not Applicable     Chronic    Peripheral vascular disease, unspecified [I73.9] 10/28/2019 Yes     Chronic    Slow transit constipation [K59.01] 10/28/2019 Yes     Chronic    Fecal obstruction [K56.41]  Yes    Depression [F32.9] 10/17/2019 Yes    History of stent insertion of left renal artery 7/19/17 [Z98.890] 07/19/2017 Not Applicable     Chronic    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, T45.1X5A] 12/27/2016 Yes     Chronic     Chronic hyponatremia [E87.1] 10/12/2016 Yes     Chronic    DLBCL (diffuse large B cell lymphoma) [C83.30] 09/09/2016 Yes     Chronic    Acquired hypothyroidism [E03.9] 08/18/2016 Yes     Chronic    Renovascular hypertension [I15.0] 08/18/2016 Yes     Chronic    Epilepsy [G40.909] 08/18/2016 Yes     Chronic      Problems Resolved During this Admission:     Sinus pause  -sinus pause with no escape rhythm; longest episode approximately 11 seconds on 10/30/2019  -Lytes WNL  -no recent use of BB or CCB  -initially TCP at HR of 100; TVP placed with HR 80 and MA 10;   -Dopa off now and good BP  -TVP rate dropped to 50, pt currently in SR  -will continue to follow and continue TVP for now     Paroxysmal atrial fibrillation  -no history of afib  -no admission EKG; EKG on 10/30 with afib with RVR with   -on BB at home for HTN management; on hold since admission (10/27) due to acute issues  -no BB or CCB due to sinus pauses  -current appears NSR   -no chronic AC due to recent surgical intervention and risk of bleeding    VTE Risk Mitigation (From admission, onward)         Ordered     IP VTE LOW RISK PATIENT  Once      10/30/19 0811     Place sequential compression device  Until discontinued      10/28/19 0607                Timmy Simmons MD  Cardiology  [facility]

## 2019-11-02 NOTE — PLAN OF CARE
Patient on  with documented settings.  Alarms are set and functioning with adequate volumes.  AMBU bag and mask at bedside.  SPONT  Breathing trial in progress      Will cont to monitor

## 2019-11-02 NOTE — PROGRESS NOTES
This is an attestation of a separate MICU house staff note from today.     85 y/o F who presented initially with colonic ischemic requiring resection, septic shock, acute hypoxic resp failure.  Septic shock has essentially resolved, has a TVP for bradycardia.  RASS -4 on no sedation, passing SBT.  No stool in ostomy.      11/2: passing SBT, RASS 0, CAM -.  Not in shock.  On CRRT     Recommendations:  1.  Extubate.  I discussed with family, patient would not to be reintubated if resp failure returns  2.  Would stop TPN as she is not chronically malnourished and has acute critical illness where the recommendation is not to use TPN until after 7 days if we can't enterally feed her.  Expect that we will be able to enterally feed her soon.    3.  abx for ecoli in blood  4.  DVT prophy  5.  Family said the patient would not want prolonged vent or ICU support if she isn't improving but think she would be accepting of our current care       90 minutes of critical care time was spent in the care of the patient. This included management of organ failures related to critical illness, titration of continuous infusions, management of mechanical ventilation, review of pertinent labs and imaging studies, discussion of the patient with consulting services, and discussion of the patients condition with the  family.

## 2019-11-02 NOTE — PLAN OF CARE
Pt was able to follow commands this morning. Rt placed her on spontaneous for about an hour. Pulmonary CC team rounded and decided to extubate. Extubated to Room Air, tolerated well.No pressors. CRRT all shift. Paced decreased to 50

## 2019-11-02 NOTE — PROGRESS NOTES
Progress Note  Nephrology      Consult Requested By: José Manuel Guidry MD      SUBJECTIVE:     Overnight events  Patient is a 84 y.o. female     Patient Active Problem List   Diagnosis    Renovascular hypertension    Epilepsy    Acquired hypothyroidism    Osteoarthritis of lumbar spine    Degenerative joint disease of sacroiliac joint    S/P exploratory laparotomy    DLBCL (diffuse large B cell lymphoma)    Chronic hyponatremia    Anemia due to antineoplastic chemotherapy    Bilateral renal artery stenosis    Closed comminuted fracture of right humerus    Closed displaced fracture of distal phalanx of right little finger    Age-related osteoporosis with current pathological fracture with routine healing    Closed wedge compression fracture of eleventh thoracic vertebra with routine healing    DDD (degenerative disc disease), lumbar    Chronic bilateral low back pain without sciatica    Age-related osteoporosis without current pathological fracture    Vitamin D deficiency    Uncontrolled hypertension    Subjective memory complaints    Depression    E. coli septic shock    Abnormal liver enzymes    Old MI (myocardial infarction)    Peripheral vascular disease, unspecified    History of stent insertion of left renal artery 7/19/17    Colon necrosis    Slow transit constipation    Fecal obstruction    E coli bacteremia    Abdominal pain    Paroxysmal atrial fibrillation    Sinus pause    DIC (disseminated intravascular coagulation)    Anemia of acute infection    Thrombocytopenia    Enteritis    Lactic acidosis     Past Medical History:   Diagnosis Date    Cancer     colon    Hypertension     Petit mal epilepsy 1954    Scoliosis of lumbar spine     Seizures     Unspecified hypothyroidism               OBJECTIVE:     Vitals:    11/02/19 1045 11/02/19 1100 11/02/19 1115 11/02/19 1130   BP: (!) 143/72 139/67  (!) 151/61   Pulse: 75 77  75   Resp: (!) 24 (!) 26  (!) 24   Temp:    96.4 °F (35.8 °C)    TempSrc:   Axillary    SpO2: 100% 100%  100%   Weight:       Height:           Temp: 96.4 °F (35.8 °C) (11/02/19 1115)  Pulse: 75 (11/02/19 1130)  Resp: (!) 24 (11/02/19 1130)  BP: (!) 151/61 (11/02/19 1130)  SpO2: 100 % (11/02/19 1130)    Date 11/02/19 0700 - 11/03/19 0659   Shift 9261-7303 [id number] 9083-2360 24 Hour Total   INTAKE   IV Piggyback 350   350   Shift Total(mL/kg) 350(5)   350(5)   OUTPUT   Urine(mL/kg/hr) 400   400   Other 617   617   Shift Total(mL/kg) 1017(14.4)   1017(14.4)   Weight (kg) 70.6 70.6 70.6 70.6             Medications:   bumetanide  2 mg Intravenous Daily    ertapenem (INVANZ) IVPB  500 mg Intravenous Daily    famotidine (PF)  20 mg Intravenous Daily    levothyroxine  125 mcg Oral Before breakfast    OXcarbazepine  300 mg Oral QHS    PHENobarbital  64.8 mg Oral Nightly    pravastatin  20 mg Oral Daily    sodium phosphate IVPB  15 mmol Intravenous Once      sodium chloride 0.9%      TPN ADULT CENTRAL LINE CUSTOM 75 mL/hr at 11/01/19 1737    TPN ADULT CENTRAL LINE CUSTOM                 Physical Exam:  General appearance:NAD  Awake  Lungs: diminished breath sounds  Heart: pulse 75  /61  Abdomen: soft  Extremities: edema  Skin: dry    Laboratory:  ABG  Labs reviewed  Recent Results (from the past 336 hour(s))   Basic metabolic panel    Collection Time: 11/02/19  4:30 AM   Result Value Ref Range    Sodium 127 (L) 136 - 145 mmol/L    Potassium 4.3 3.5 - 5.1 mmol/L    Chloride 98 95 - 110 mmol/L    CO2 20 (L) 23 - 29 mmol/L    BUN, Bld 86 (H) 8 - 23 mg/dL    Creatinine 2.7 (H) 0.5 - 1.4 mg/dL    Calcium 7.3 (L) 8.7 - 10.5 mg/dL    Anion Gap 9 8 - 16 mmol/L   Basic metabolic panel    Collection Time: 11/01/19  2:54 PM   Result Value Ref Range    Sodium 122 (L) 136 - 145 mmol/L    Potassium 4.9 3.5 - 5.1 mmol/L    Chloride 90 (L) 95 - 110 mmol/L    CO2 22 (L) 23 - 29 mmol/L    BUN, Bld 96 (H) 8 - 23 mg/dL    Creatinine 3.1 (H) 0.5 - 1.4 mg/dL    Calcium  8.0 (L) 8.7 - 10.5 mg/dL    Anion Gap 10 8 - 16 mmol/L   Basic metabolic panel    Collection Time: 11/01/19  4:41 AM   Result Value Ref Range    Sodium 122 (L) 136 - 145 mmol/L    Potassium 4.8 3.5 - 5.1 mmol/L    Chloride 90 (L) 95 - 110 mmol/L    CO2 23 23 - 29 mmol/L    BUN, Bld 83 (H) 8 - 23 mg/dL    Creatinine 3.1 (H) 0.5 - 1.4 mg/dL    Calcium 7.9 (L) 8.7 - 10.5 mg/dL    Anion Gap 9 8 - 16 mmol/L     Recent Results (from the past 336 hour(s))   CBC with Automated Differential    Collection Time: 11/02/19  4:30 AM   Result Value Ref Range    WBC 10.84 3.90 - 12.70 K/uL    Hemoglobin 8.3 (L) 12.0 - 16.0 g/dL    Hematocrit 24.0 (L) 37.0 - 48.5 %    Platelets 41 (L) 150 - 350 K/uL   CBC with Automated Differential    Collection Time: 11/01/19  4:41 AM   Result Value Ref Range    WBC 6.09 3.90 - 12.70 K/uL    Hemoglobin 6.9 (L) 12.0 - 16.0 g/dL    Hematocrit 19.6 (LL) 37.0 - 48.5 %    Platelets 45 (L) 150 - 350 K/uL   CBC auto differential    Collection Time: 10/31/19 11:12 AM   Result Value Ref Range    WBC 4.34 3.90 - 12.70 K/uL    Hemoglobin 7.3 (L) 12.0 - 16.0 g/dL    Hematocrit 21.1 (L) 37.0 - 48.5 %    Platelets 41 (L) 150 - 350 K/uL     Urinalysis  No results for input(s): COLORU, CLARITYU, SPECGRAV, PHUR, PROTEINUA, GLUCOSEU, BILIRUBINCON, BLOODU, WBCU, RBCU, BACTERIA, MUCUS, NITRITE, LEUKOCYTESUR, UROBILINOGEN, HYALINECASTS in the last 24 hours.    Diagnostic Results:  X-Ray: Reviewed  US: Reviewed  Echo: Reviewed  ACCESS    ASSESSMENT/PLAN:     JAS  UO 1080 cc/24 h  Hyponatremia 127  Azotemia  Metabolic bone disease  Hypophosphatemia  Replace prn  Anemia  Hb 8.3  Transfused  PRBC  Iron  Epogen  Platelets 41  Poor nutrition  Albumin 1.8  TPN  Blood pressure 151/61  Stable  I and O  Weight daily      Continue CRRT  Will follow up

## 2019-11-02 NOTE — PROGRESS NOTES
[facility]  Infectious Disease  Progress Note  Pt Name: Annette Garcia   Room: K261/K261 A  Admitted On: 10/27/2019  Today: 2019    Subjective:       Patient extubated this morning and off pressors.  CRRT started last night.  Alert and speaking this morning.  States she is feeling better.  Complaining of pain in her hands and feet.       Objective:   Last 24 Hour Vital Signs:  BP  Min: 65/35  Max: 143/72  Temp  Av.7 °F (36.5 °C)  Min: 96.5 °F (35.8 °C)  Max: 98.4 °F (36.9 °C)  Pulse  Av.2  Min: 68  Max: 114  Resp  Av.6  Min: 14  Max: 44  SpO2  Av.7 %  Min: 92 %  Max: 100 %  Weight  Av.6 kg (155 lb 10.3 oz)  Min: 70.6 kg (155 lb 10.3 oz)  Max: 70.6 kg (155 lb 10.3 oz)  I/O last 3 completed shifts:  In: 3341.4 [I.V.:286.4; Blood:697.5; NG/GT:110; IV Piggyback:100]  Out: 3920 [Urine:1840; Drains:1500; Other:580]    Physical Examination:  Const: Frail, ill-appearing  HEENT: NCAT, PERRL, EOM nml  Cardio: RRR, no murmurs, 2+ peripheral pulses  Pulm: Lungs clear to auscultation bilaterally, normal work of breathing  Abdomen: Soft, non-distended, tender to palpation R side.  Surgical site dressing in place. Ostomy in place with liquid stool in bag.    Skin: Warm and dry, no rash  Neuro: Alert and oriented x 3. No focal deficits.    Psych: Normal mood and affect.    Laboratory:    Hematology:  Recent Labs   Lab 10/30/19  0522 10/31/19  0410 10/31/19  1112 19  0441 19  0430   WBC 5.56 4.36 4.34 6.09 10.84   HGB 9.2* 7.4* 7.3* 6.9* 8.3*   PLT 69* 48* 41* 45* 41*   MCV 98 97 96 95 96   RDW 14.2 14.0 14.1 14.2 14.3       Chemistry:  Recent Labs   Lab 10/28/19  0046  10/30/19  0522  10/30/19  1510 10/30/19  2102 10/31/19  0410 10/31/19  1110 10/31/19  1645 10/31/19  2019 19  0441 19  1454 19  0430 19  0834   *   < > 124*   < > 124* 124* 125* 124* 124* 123* 122*  122* 122* 127*  127*  --    K 4.6   < > 4.7   < > 4.3 3.7 4.1 4.0 4.5 4.9  4.8  4.8 4.9 4.3  4.3  --    CL 95   < > 93*   < > 92* 92* 92* 92* 92* 92* 90*  90* 90* 98  98  --    CO2 21*   < > 20*   < > 22* 22* 24 23 23 24 23  23 22* 20*  20*  --    BUN 27*   < > 51*   < > 55* 56* 62* 67* 71* 74* 83*  83* 96* 86*  86*  --    CREATININE 1.6*   < > 2.1*   < > 2.4* 2.4* 2.5* 2.5* 2.7* 2.8* 3.1*  3.1* 3.1* 2.7*  2.7*  --    *   < > 86   < > 200* 205* 182* 177* 149* 171* 168*  168* 97 214*  214*  --    CALCIUM 10.1   < > 7.6*   < > 7.1* 7.6* 7.6* 7.6* 7.7* 7.7* 7.9*  7.9* 8.0* 7.3*  7.3*  --    PROT 6.1  --   --   --  3.9*  --  4.5*  --   --   --  4.7*  --  4.5*  --    ALBUMIN 3.6   < >  --   --  2.2*  --  2.0* 1.9*  --   --  2.1*  --  1.8*  --    *  --   --   --  46*  --  39  --   --   --  29  --  22  --    *  --   --   --  61*  --  62*  --   --   --  59*  --  51*  --    ALKPHOS 57  --   --   --  40*  --  45*  --   --   --  53*  --  79  --    MG  --    < >  --    < >  --  3.7*  --  3.7*  --   --  3.6*  --  2.9* 2.6   PHOS  --    < > 5.2*  --   --  4.0  --   --   --   --  3.9  --  3.2 2.4*    < > = values in this interval not displayed.     DM:  Recent Labs   Lab 11/01/19  0441 11/01/19  1454 11/02/19  0430   *  168* 97 214*  214*     Microbiology:  Microbiology Results (last 7 days)     Procedure Component Value Units Date/Time    Blood culture [[id number]] Collected:  11/01/19 1335    Order Status:  Completed Specimen:  Blood Updated:  11/02/19 0115     Blood Culture, Routine No Growth to date    Blood culture [[id number]] Collected:  11/01/19 1516    Order Status:  Completed Specimen:  Blood Updated:  11/02/19 0115     Blood Culture, Routine No Growth to date    Narrative:       Collection has been rescheduled by AC2 at 11/01/2019 14:23 Reason: pt   recieving unit  Collection has been rescheduled by AC2 at 11/01/2019 14:23 Reason: pt   recieving unit    Blood culture [[id number]]     Order Status:  Canceled Specimen:  Blood     Blood culture [[id number]]      Order Status:  Canceled Specimen:  Blood     Blood Culture #1 **CANNOT BE ORDERED STAT** [[id number]]  (Abnormal)  (Susceptibility) Collected:  10/28/19 0049    Order Status:  Completed Specimen:  Blood from Peripheral, Right  Wrist Updated:  10/30/19 1024     Blood Culture, Routine Gram stain aer bottle: Gram negative rods       Results called to and read back by: Kinga Bateman RN  10/28/2019  19:01      ESCHERICHIA COLI    Blood Culture #2 **CANNOT BE ORDERED STAT** [[id number]] Collected:  10/28/19 0049    Order Status:  Sent Specimen:  Blood from Peripheral, Hand, Left Updated:  10/28/19 0050          Other Results:  Radiology:  X-ray Chest 1 View    Result Date: 11/1/2019  EXAMINATION: XR CHEST 1 VIEW CLINICAL HISTORY: R IJ trialysis line placement; TECHNIQUE: Single frontal view of the chest was performed. COMPARISON: Earlier today FINDINGS: Right internal jugular line has its tip in the superior vena cava.  Enteric tube approximately 3 cm above jesús level.  Additional tube/line appear unchanged.  No change in the cardiopulmonary status.     As above Electronically signed by: Carleen Montana MD Date:    11/01/2019 Time:    17:26    I have reviewed the above reports as well as previous records.    Antibiotics and Day Number of Therapy:  Antibiotics (From admission, onward)    Start     Stop Route Frequency Ordered    10/30/19 1600  ertapenem (INVANZ) 0.5 g in sodium chloride 0.9% 100 mL IVPB      -- IV Daily 10/30/19 1510            Assessment/Plan:     Ms. Garcia is an 85 yo female with E. Coli sepsis s/p sigmoid colectomy for bowel necrosis POD4.  She has multiple allergies to antibiotics, currently being treated with vancomycin and ertapenem and tolerating those well.     E. Coli septic shock  - Patient improving overall; now extubated and awake/alert; afebrile, normotensive; not requiring pressor support.  Hyponatremia is also improving.  - On day 4 of ertapenem, day 4 of vancomycin (random level  this AM 17.2)  - Repeat cultures on 11/1 with NG  - Discontinue vancomycin  - Continue ertapenem     Rob Junior MD  [employer] [facility]  Infectious Disease

## 2019-11-02 NOTE — PROGRESS NOTES
Pharmacokinetic Assessment Follow Up: IV Vancomycin    Vancomycin serum concentration assessment(s):    The random level was drawn correctly and can be used to guide therapy at this time. The measurement is within the desired definitive target range of 15 to 20 mcg/mL.    Vancomycin Regimen Plan:    Redose with vancomycin 1000 mg IV X 1 dose this AM. Random vancomycin level will be drawn at 0400 on 11/3/19.    Drug levels (last 3 results):  Recent Labs   Lab Result Units 10/31/19  1110 11/01/19  1213 11/02/19  0430   Vancomycin, Random ug/mL 13.7 25.8 17.2       Pharmacy will continue to follow and monitor vancomycin.    Please contact pharmacy at extension [phone] for questions regarding this assessment.    Thank you for the consult,   Carlo Roblero       Patient brief summary:  Annette Garcia is a 84 y.o. female initiated on antimicrobial therapy with IV Vancomycin for treatment of intra-abdominal infection    The patient's current regimen is vancomycin is being pulse dosed based on the results of serum vancomycin levels.    Drug Allergies:   Review of patient's allergies indicates:   Allergen Reactions    Adhesive Itching and Blisters    Penicillins Anaphylaxis    Tramadol Hives    Avelox [moxifloxacin] Rash     Facial and arm itching and redness. Pt states throat closes when given.    Amoxil [amoxicillin]     Aspridrox [aspirin, buffered]     Codeine Other (See Comments)     Throat swelling    Keflex [cephalexin]      Tolerated cefepime    Norvasc [amlodipine]     Red dye Hives    Robitussin [guaifenesin]     Sulfa (sulfonamide antibiotics)     Tylenol [acetaminophen]      Has reaction to Tylenol with red dye and unable to take Extra Strength Tylenol/ CAN ONLY TOLERATE REG STRENGTH TYLENOL    Vicks vaporub [camphor-eucalyptus oil-menthol]        Actual Body Weight:   70.6 kg    Renal Function:   Estimated Creatinine Clearance: 14.1 mL/min (A) (based on SCr of 2.7 mg/dL (H)).,     Dialysis Method (if  applicable):  CRRT    CBC (last 72 hours):  Recent Labs   Lab Result Units 10/31/19  0410 10/31/19  1112 11/01/19  0441   WBC K/uL 4.36 4.34 6.09   Hemoglobin g/dL 7.4* 7.3* 6.9*   Hematocrit % 21.8* 21.1* 19.6*   Platelets K/uL 48* 41* 45*   Gran% % 65.0 79.5* 71.0   Lymph% % 20.0 7.6* 10.0*   Mono% % 3.0* 1.6* 2.0*   Eosinophil% % 1.0 1.4 1.0   Basophil% % 0.0 0.0 1.0   Differential Method  Manual automated Manual       Metabolic Panel (last 72 hours):  Recent Labs   Lab Result Units 10/30/19  0940 10/30/19  1342 10/30/19  1510 10/30/19  2102 10/31/19  0410 10/31/19  1110 10/31/19  1645 10/31/19  2019 11/01/19  0441 11/01/19  0807 11/01/19  1454 11/02/19  0430   Sodium mmol/L  --  125* 124* 124* 125* 124* 124* 123* 122*  122*  --  122* 127*  127*   Sodium Urine Random mmol/L <20*  --   --   --   --   --   --   --   --  56  --   --    Potassium mmol/L  --  4.6 4.3 3.7 4.1 4.0 4.5 4.9 4.8  4.8  --  4.9 4.3  4.3   Chloride mmol/L  --  92* 92* 92* 92* 92* 92* 92* 90*  90*  --  90* 98  98   CO2 mmol/L  --  20* 22* 22* 24 23 23 24 23  23  --  22* 20*  20*   Glucose mg/dL  --  85 200* 205* 182* 177* 149* 171* 168*  168*  --  97 214*  214*   BUN, Bld mg/dL  --  53* 55* 56* 62* 67* 71* 74* 83*  83*  --  96* 86*  86*   Creatinine mg/dL  --  2.3* 2.4* 2.4* 2.5* 2.5* 2.7* 2.8* 3.1*  3.1*  --  3.1* 2.7*  2.7*   Albumin g/dL  --   --  2.2*  --  2.0* 1.9*  --   --  2.1*  --   --  1.8*   Total Bilirubin mg/dL  --   --  1.0  --  1.0  --   --   --  1.0  --   --  0.9   Alkaline Phosphatase U/L  --   --  40*  --  45*  --   --   --  53*  --   --  79   AST U/L  --   --  61*  --  62*  --   --   --  59*  --   --  51*   ALT U/L  --   --  46*  --  39  --   --   --  29  --   --  22   Magnesium mg/dL  --  4.1*  --  3.7*  --  3.7*  --   --  3.6*  --   --  2.9*   Phosphorus mg/dL  --   --   --  4.0  --   --   --   --  3.9  --   --  3.2       Vancomycin Administrations:  vancomycin given in the last 96 hours                      vancomycin in dextrose 5 % 1 gram/250 mL IVPB 1,000 mg (mg) 1,000 mg New Bag 10/31/19 1714                      Microbiologic Results:  Microbiology Results (last 7 days)       Procedure Component Value Units Date/Time    Blood culture [[id number]] Collected:  11/01/19 1335    Order Status:  Completed Specimen:  Blood Updated:  11/02/19 0115     Blood Culture, Routine No Growth to date    Blood culture [[id number]] Collected:  11/01/19 1516    Order Status:  Completed Specimen:  Blood Updated:  11/02/19 0115     Blood Culture, Routine No Growth to date    Narrative:       Collection has been rescheduled by AC2 at 11/01/2019 14:23 Reason: pt   recieving unit  Collection has been rescheduled by AC2 at 11/01/2019 14:23 Reason: pt   recieving unit    Blood culture [[id number]]     Order Status:  Canceled Specimen:  Blood     Blood culture [[id number]]     Order Status:  Canceled Specimen:  Blood     Blood Culture #1 **CANNOT BE ORDERED STAT** [[id number]]  (Abnormal)  (Susceptibility) Collected:  10/28/19 0049    Order Status:  Completed Specimen:  Blood from Peripheral, Right  Wrist Updated:  10/30/19 1024     Blood Culture, Routine Gram stain aer bottle: Gram negative rods       Results called to and read back by: Kinga Bateman RN  10/28/2019  19:01      ESCHERICHIA COLI    Blood Culture #2 **CANNOT BE ORDERED STAT** [[id number]] Collected:  10/28/19 0049    Order Status:  Sent Specimen:  Blood from Peripheral, Hand, Left Updated:  10/28/19 0050

## 2019-11-02 NOTE — PROGRESS NOTES
[facility]  General Surgery  Progress Note    Subjective:     Interval History:   Extubated this morning  Talking, moving all extremities, answering questions appropriately  Off pressors  +ostomy output, flatus, liquid stool  Denies abdominal pain  Afebrile  On CRRT    Post-Op Info:  Procedure(s) (LRB):  Insertion, Pacemaker, Temporary Transvenous (Right)   3 Days Post-Op      Medications:  Continuous Infusions:   sodium chloride 0.9%      TPN ADULT CENTRAL LINE CUSTOM 75 mL/hr at 11/01/19 1737     Scheduled Meds:   bumetanide  2 mg Intravenous Daily    ertapenem (INVANZ) IVPB  500 mg Intravenous Daily    famotidine (PF)  20 mg Intravenous Daily    levothyroxine  125 mcg Oral Before breakfast    OXcarbazepine  300 mg Oral QHS    PHENobarbital  64.8 mg Oral Nightly    pravastatin  20 mg Oral Daily    sodium phosphate IVPB  15 mmol Intravenous Once     PRN Meds:sodium chloride, acetaminophen, albuterol-ipratropium, bumetanide, fentaNYL, magnesium sulfate IVPB, morphine, ondansetron, promethazine (PHENERGAN) IVPB, ramelteon, sodium chloride 0.9%, sodium phosphate IVPB, sodium phosphate IVPB, sodium phosphate IVPB     Objective:     Vital Signs (Most Recent):  Temp: 96.5 °F (35.8 °C) (11/02/19 0730)  Pulse: 77 (11/02/19 1100)  Resp: (!) 26 (11/02/19 1100)  BP: 139/67 (11/02/19 1100)  SpO2: 100 % (11/02/19 1100) Vital Signs (24h Range):  Temp:  [96.5 °F (35.8 °C)-98.4 °F (36.9 °C)] 96.5 °F (35.8 °C)  Pulse:  [] 77  Resp:  [14-44] 26  SpO2:  [92 %-100 %] 100 %  BP: ()/(35-72) 139/67       Intake/Output Summary (Last 24 hours) at 11/2/2019 1137  Last data filed at 11/2/2019 1100  Gross per 24 hour   Intake 2467.62 ml   Output 3313 ml   Net -845.38 ml       Physical Exam   Constitutional: She is oriented to person, place, and time. No distress.   Cardiovascular: Normal rate.   Pulmonary/Chest: Effort normal. No respiratory distress.   Abdominal: Soft. She exhibits no distension.    Incision looks good  Ostomy intact, good output, dusky mucosa   Neurological: She is alert and oriented to person, place, and time.       Significant Labs:  CBC:   Recent Labs   Lab 11/02/19  0430   WBC 10.84   RBC 2.51*   HGB 8.3*   HCT 24.0*   PLT 41*   MCV 96   MCH 33.1*   MCHC 34.6     CMP:   Recent Labs   Lab 11/02/19  0430   *  214*   CALCIUM 7.3*  7.3*   ALBUMIN 1.8*   PROT 4.5*   *  127*   K 4.3  4.3   CO2 20*  20*   CL 98  98   BUN 86*  86*   CREATININE 2.7*  2.7*   ALKPHOS 79   ALT 22   AST 51*   BILITOT 0.9       Significant Diagnostics:  I have reviewed all pertinent imaging results/findings within the past 24 hours.    Assessment/Plan:     Active Diagnoses:    Diagnosis Date Noted POA    PRINCIPAL PROBLEM:  Colon necrosis [K55.049] 10/27/2019 Yes    Anemia of acute infection [D64.9] 11/01/2019 No    Thrombocytopenia [D69.6] 11/01/2019 No    Enteritis [K52.9]  Yes    Lactic acidosis [E87.2]  Yes    Paroxysmal atrial fibrillation [I48.0] 10/30/2019 Yes    Sinus pause [I45.5] 10/30/2019 No    E coli bacteremia [R78.81] 10/29/2019 Yes    Abdominal pain [R10.9] 10/29/2019 Unknown    DIC (disseminated intravascular coagulation) [D65] 10/29/2019 Yes    E. 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      continue abx  clamp NG for now since she has return of bowel function, may start trickle feeds later today, bedside swallow study, PO trial today vs tomorrow   continue TPN, will wean to off if she tolerates tube feeds/PO      Chris Johnson MD  General Surgery  [facility]

## 2019-11-03 LAB
ALBUMIN SERPL BCP-MCNC: 1.8 G/DL (ref 3.5–5.2)
ALP SERPL-CCNC: 117 U/L (ref 55–135)
ALT SERPL W/O P-5'-P-CCNC: 23 U/L (ref 10–44)
ANION GAP SERPL CALC-SCNC: 10 MMOL/L (ref 8–16)
ANION GAP SERPL CALC-SCNC: 9 MMOL/L (ref 8–16)
ANION GAP SERPL CALC-SCNC: 9 MMOL/L (ref 8–16)
ANISOCYTOSIS BLD QL SMEAR: SLIGHT
AST SERPL-CCNC: 50 U/L (ref 10–40)
BASOPHILS # BLD AUTO: ABNORMAL K/UL (ref 0–0.2)
BASOPHILS NFR BLD: 0 % (ref 0–1.9)
BILIRUB SERPL-MCNC: 0.6 MG/DL (ref 0.1–1)
BUN SERPL-MCNC: 66 MG/DL (ref 8–23)
BUN SERPL-MCNC: 66 MG/DL (ref 8–23)
BUN SERPL-MCNC: 76 MG/DL (ref 8–23)
CALCIUM SERPL-MCNC: 7.4 MG/DL (ref 8.7–10.5)
CALCIUM SERPL-MCNC: 7.5 MG/DL (ref 8.7–10.5)
CALCIUM SERPL-MCNC: 7.5 MG/DL (ref 8.7–10.5)
CHLORIDE SERPL-SCNC: 103 MMOL/L (ref 95–110)
CHLORIDE SERPL-SCNC: 103 MMOL/L (ref 95–110)
CHLORIDE SERPL-SCNC: 105 MMOL/L (ref 95–110)
CO2 SERPL-SCNC: 22 MMOL/L (ref 23–29)
CO2 SERPL-SCNC: 23 MMOL/L (ref 23–29)
CO2 SERPL-SCNC: 23 MMOL/L (ref 23–29)
CREAT SERPL-MCNC: 2 MG/DL (ref 0.5–1.4)
CREAT SERPL-MCNC: 2 MG/DL (ref 0.5–1.4)
CREAT SERPL-MCNC: 2.3 MG/DL (ref 0.5–1.4)
DIFFERENTIAL METHOD: ABNORMAL
EOSINOPHIL # BLD AUTO: ABNORMAL K/UL (ref 0–0.5)
EOSINOPHIL NFR BLD: 0 % (ref 0–8)
ERYTHROCYTE [DISTWIDTH] IN BLOOD BY AUTOMATED COUNT: 14.7 % (ref 11.5–14.5)
EST. GFR  (AFRICAN AMERICAN): 22 ML/MIN/1.73 M^2
EST. GFR  (AFRICAN AMERICAN): 26 ML/MIN/1.73 M^2
EST. GFR  (AFRICAN AMERICAN): 26 ML/MIN/1.73 M^2
EST. GFR  (NON AFRICAN AMERICAN): 19 ML/MIN/1.73 M^2
EST. GFR  (NON AFRICAN AMERICAN): 23 ML/MIN/1.73 M^2
EST. GFR  (NON AFRICAN AMERICAN): 23 ML/MIN/1.73 M^2
GLUCOSE SERPL-MCNC: 135 MG/DL (ref 70–110)
GLUCOSE SERPL-MCNC: 190 MG/DL (ref 70–110)
GLUCOSE SERPL-MCNC: 190 MG/DL (ref 70–110)
HCT VFR BLD AUTO: 23.9 % (ref 37–48.5)
HGB BLD-MCNC: 8 G/DL (ref 12–16)
HYPOCHROMIA BLD QL SMEAR: ABNORMAL
LYMPHOCYTES # BLD AUTO: ABNORMAL K/UL (ref 1–4.8)
LYMPHOCYTES NFR BLD: 6 % (ref 18–48)
MAGNESIUM SERPL-MCNC: 2.1 MG/DL (ref 1.6–2.6)
MAGNESIUM SERPL-MCNC: 2.2 MG/DL (ref 1.6–2.6)
MCH RBC QN AUTO: 32.3 PG (ref 27–31)
MCHC RBC AUTO-ENTMCNC: 33.5 G/DL (ref 32–36)
MCV RBC AUTO: 96 FL (ref 82–98)
METAMYELOCYTES NFR BLD MANUAL: 1 %
MONOCYTES # BLD AUTO: ABNORMAL K/UL (ref 0.3–1)
MONOCYTES NFR BLD: 0 % (ref 4–15)
NEUTROPHILS NFR BLD: 93 % (ref 38–73)
PHOSPHATE SERPL-MCNC: 2.5 MG/DL (ref 2.7–4.5)
PHOSPHATE SERPL-MCNC: 2.5 MG/DL (ref 2.7–4.5)
PHOSPHATE SERPL-MCNC: 2.7 MG/DL (ref 2.7–4.5)
PLATELET # BLD AUTO: 59 K/UL (ref 150–350)
PLATELET BLD QL SMEAR: ABNORMAL
PMV BLD AUTO: 10.9 FL (ref 9.2–12.9)
POIKILOCYTOSIS BLD QL SMEAR: SLIGHT
POLYCHROMASIA BLD QL SMEAR: ABNORMAL
POTASSIUM SERPL-SCNC: 3.8 MMOL/L (ref 3.5–5.1)
PROT SERPL-MCNC: 4.4 G/DL (ref 6–8.4)
RBC # BLD AUTO: 2.48 M/UL (ref 4–5.4)
SODIUM SERPL-SCNC: 135 MMOL/L (ref 136–145)
SODIUM SERPL-SCNC: 135 MMOL/L (ref 136–145)
SODIUM SERPL-SCNC: 137 MMOL/L (ref 136–145)
WBC # BLD AUTO: 9.69 K/UL (ref 3.9–12.7)

## 2019-11-03 PROCEDURE — 85027 COMPLETE CBC AUTOMATED: CPT

## 2019-11-03 PROCEDURE — 63600175 PHARM REV CODE 636 W HCPCS: Performed by: STUDENT IN AN ORGANIZED HEALTH CARE EDUCATION/TRAINING PROGRAM

## 2019-11-03 PROCEDURE — 99233 PR SUBSEQUENT HOSPITAL CARE,LEVL III: ICD-10-PCS | Mod: ,,, | Performed by: INTERNAL MEDICINE

## 2019-11-03 PROCEDURE — 25000003 PHARM REV CODE 250: Performed by: INTERNAL MEDICINE

## 2019-11-03 PROCEDURE — 63600175 PHARM REV CODE 636 W HCPCS: Performed by: HOSPITALIST

## 2019-11-03 PROCEDURE — 25000003 PHARM REV CODE 250: Performed by: EMERGENCY MEDICINE

## 2019-11-03 PROCEDURE — S0030 INJECTION, METRONIDAZOLE: HCPCS | Performed by: INTERNAL MEDICINE

## 2019-11-03 PROCEDURE — 99233 SBSQ HOSP IP/OBS HIGH 50: CPT | Mod: ,,, | Performed by: INTERNAL MEDICINE

## 2019-11-03 PROCEDURE — 83735 ASSAY OF MAGNESIUM: CPT | Mod: 91

## 2019-11-03 PROCEDURE — 85007 BL SMEAR W/DIFF WBC COUNT: CPT

## 2019-11-03 PROCEDURE — 20000000 HC ICU ROOM

## 2019-11-03 PROCEDURE — 80053 COMPREHEN METABOLIC PANEL: CPT

## 2019-11-03 PROCEDURE — 25000003 PHARM REV CODE 250: Performed by: NURSE PRACTITIONER

## 2019-11-03 PROCEDURE — 94761 N-INVAS EAR/PLS OXIMETRY MLT: CPT

## 2019-11-03 PROCEDURE — 83735 ASSAY OF MAGNESIUM: CPT

## 2019-11-03 PROCEDURE — 80048 BASIC METABOLIC PNL TOTAL CA: CPT

## 2019-11-03 PROCEDURE — 84100 ASSAY OF PHOSPHORUS: CPT

## 2019-11-03 PROCEDURE — 84100 ASSAY OF PHOSPHORUS: CPT | Mod: 91

## 2019-11-03 RX ORDER — CEFAZOLIN SODIUM 1 G/50ML
1 SOLUTION INTRAVENOUS
Status: DISCONTINUED | OUTPATIENT
Start: 2019-11-03 | End: 2019-11-08

## 2019-11-03 RX ORDER — METRONIDAZOLE 500 MG/100ML
500 INJECTION, SOLUTION INTRAVENOUS
Status: DISCONTINUED | OUTPATIENT
Start: 2019-11-03 | End: 2019-11-06

## 2019-11-03 RX ADMIN — HEPARIN SODIUM 5000 UNITS: 5000 INJECTION, SOLUTION INTRAVENOUS; SUBCUTANEOUS at 02:11

## 2019-11-03 RX ADMIN — HEPARIN SODIUM 5000 UNITS: 5000 INJECTION, SOLUTION INTRAVENOUS; SUBCUTANEOUS at 05:11

## 2019-11-03 RX ADMIN — OXCARBAZEPINE 300 MG: 150 TABLET, FILM COATED ORAL at 07:11

## 2019-11-03 RX ADMIN — METRONIDAZOLE 500 MG: 500 INJECTION, SOLUTION INTRAVENOUS at 11:11

## 2019-11-03 RX ADMIN — HEPARIN SODIUM 5000 UNITS: 5000 INJECTION, SOLUTION INTRAVENOUS; SUBCUTANEOUS at 09:11

## 2019-11-03 RX ADMIN — PRAVASTATIN SODIUM 20 MG: 20 TABLET ORAL at 09:11

## 2019-11-03 RX ADMIN — CEFAZOLIN SODIUM 1 G: 1 SOLUTION INTRAVENOUS at 09:11

## 2019-11-03 RX ADMIN — METRONIDAZOLE 500 MG: 500 INJECTION, SOLUTION INTRAVENOUS at 04:11

## 2019-11-03 RX ADMIN — PHENOBARBITAL 64.8 MG: 32.4 TABLET ORAL at 07:11

## 2019-11-03 RX ADMIN — LEVOTHYROXINE SODIUM 125 MCG: 25 TABLET ORAL at 05:11

## 2019-11-03 NOTE — PROGRESS NOTES
[facility]  General Surgery  Progress Note    Subjective:     Interval History:   Tolerated tube feeds per NG at 10/hr  Swallowing PO meds with sips of water, no issues  Talking, moving all extremities, answering questions appropriately  Off pressors  CRRT off  +ostomy output, flatus, liquid stool  Some liquid stool per rectum  Denies abdominal pain  Afebrile  Martinez in place    Post-Op Info:  Procedure(s) (LRB):  Insertion, Pacemaker, Temporary Transvenous (Right)   4 Days Post-Op      Medications:  Continuous Infusions:   TPN ADULT CENTRAL LINE CUSTOM 50 mL/hr at 11/02/19 2045     Scheduled Meds:   ceFAZolin (ANCEF) IVPB  1 g Intravenous Q12H    heparin (porcine)  5,000 Units Subcutaneous Q8H    levothyroxine  125 mcg Oral Before breakfast    OXcarbazepine  300 mg Oral QHS    PHENobarbital  64.8 mg Oral Nightly    pravastatin  20 mg Oral Daily     PRN Meds:sodium chloride, acetaminophen, albuterol-ipratropium, bumetanide, heparin, porcine (PF), morphine, ondansetron, promethazine (PHENERGAN) IVPB, ramelteon, sodium chloride 0.9%     Objective:     Vital Signs (Most Recent):  Temp: 97.4 °F (36.3 °C) (11/03/19 0715)  Pulse: 92 (11/03/19 1000)  Resp: 20 (11/03/19 1000)  BP: (!) 152/70 (11/03/19 1000)  SpO2: 99 % (11/03/19 1000) Vital Signs (24h Range):  Temp:  [96.4 °F (35.8 °C)-98.4 °F (36.9 °C)] 97.4 °F (36.3 °C)  Pulse:  [74-95] 92  Resp:  [20-32] 20  SpO2:  [92 %-100 %] 99 %  BP: (133-174)/() 152/70       Intake/Output Summary (Last 24 hours) at 11/3/2019 1038  Last data filed at 11/3/2019 0600  Gross per 24 hour   Intake 2477 ml   Output 2292 ml   Net 185 ml       Physical Exam   Constitutional: She is oriented to person, place, and time. No distress.   Cardiovascular: Normal rate.   Pulmonary/Chest: Effort normal. No respiratory distress.   Abdominal: Soft. She exhibits no distension.   Incision looks good  Ostomy intact, good output, dusky mucosa   Neurological: She is alert and  oriented to person, place, and time.       Significant Labs:  CBC:   Recent Labs   Lab 11/03/19  0349   WBC 9.69   RBC 2.48*   HGB 8.0*   HCT 23.9*   PLT 59*   MCV 96   MCH 32.3*   MCHC 33.5     CMP:   Recent Labs   Lab 11/03/19  0349   *  190*   CALCIUM 7.5*  7.5*   ALBUMIN 1.8*  1.8*  1.8*   PROT 4.4*   *  135*   K 3.8  3.8   CO2 23  23     103   BUN 66*  66*   CREATININE 2.0*  2.0*   ALKPHOS 117   ALT 23   AST 50*   BILITOT 0.6       Significant Diagnostics:  I have reviewed all pertinent imaging results/findings within the past 24 hours.    Assessment/Plan:     Active Diagnoses:    Diagnosis Date Noted POA    PRINCIPAL PROBLEM:  Colon necrosis [K55.049] 10/27/2019 Yes    Anemia of acute infection [D64.9] 11/01/2019 No    Thrombocytopenia [D69.6] 11/01/2019 No    Enteritis [K52.9]  Yes    Paroxysmal atrial fibrillation [I48.0] 10/30/2019 Yes    Sinus pause [I45.5] 10/30/2019 No    E coli bacteremia [R78.81] 10/29/2019 Yes    Abdominal pain [R10.9] 10/29/2019 Unknown    DIC (disseminated intravascular coagulation) [D65] 10/29/2019 Yes    Old MI (myocardial infarction) [I25.2] 10/28/2019 Not Applicable     Chronic    Peripheral vascular disease, unspecified [I73.9] 10/28/2019 Yes     Chronic    Slow transit constipation [K59.01] 10/28/2019 Yes     Chronic    Depression [F32.9] 10/17/2019 Yes    History of stent insertion of left renal artery 7/19/17 [Z98.890] 07/19/2017 Not Applicable     Chronic    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 coli septic shock [A41.51, R65.21] 10/28/2019 11/02/2019 Yes    Abnormal liver enzymes [R74.8] 10/28/2019 11/02/2019 Yes    Fecal obstruction [K56.41]  11/02/2019 Yes     84-year-old female s/p sigmoid colectomy with end colostomy 10/30/19.      continue abx  PT/OT  remove NG, Full liquid diet, boost, suplena   stop TPN  Remove gonzales?    Chris Johnson MD  General Surgery  [facility]

## 2019-11-03 NOTE — HPI
Annette Garcia is an 84 year old white woman with peripheral vascular disease, renovascular hypertension, renal artery stenosis status post left renal artery stent placement on 7/19/17, coronary artery disease with history of myocardial infarction, diffuse large B cell lymphoma, anemia, epilepsy, hypothyroidism, chronic hyponatremia, depression, lumbar and sacroiliac joint osteoarthritis with chronic low back pain and history of lumbar spine surgery in 2013, slow transit constipation, history of appendectomy, history of cholecystectomy history hysterectomy, history of small bowel obstruction status post small bowel resection on 8/23/16.  She presented to the hospital with abdominal pain, difficulty urinating and constipation x 1 day.  In the ED, she was found to be in septic shock and required pressors.  CT performed there showed constipation and peritoneal free fluid.  Blood cultures growing pan-sensitive E. Coli.  Patient was taken to the OR on 10/29 and underwent sigmoid colectomy with end colostomy for findings of sigmoid necrosis and large amounts of turbid fluid in abdomen.  Patient was kept intubated following surgery.  She was also found to by hyponatremic with altered mental status.  Patient remains intubated and on pressor support in ICU.  History is obtained by chart review.  She is currently being treated with ertapenem.  ID consult called on 11/1 for help with antibiotics.   11/2 extubated; stop vancomycin  11/3 stopped ertapenem and changed to cefazolin and flagyl  11/4 in and out cath done for urinary retention over night; off pressors; on crrt  11/5 ate food today; no pressors; crrt continues  11/6 doing well on room air, eating OK; transferred to floor  11/7 on floor, no complaints, HD catheter removed today

## 2019-11-03 NOTE — ASSESSMENT & PLAN NOTE
Ms. Garcia is an 85 yo female with E. Coli sepsis s/p sigmoid colectomy for bowel necrosis POD4.  She has multiple allergies to antibiotics, currently being treated with ertapenem; vanco discontinued 11/2;     Rec  Ok to streamline ertapenem to cefazolin per primary team  Add flagyl empirically since she had intraabdominal perforation  Watch abdominal exam carefully

## 2019-11-03 NOTE — PROGRESS NOTES
[facility]  Infectious Disease  Progress Note    Patient Name: Annette Garcia  MRN: [id number]  Admission Date: 10/27/2019  Length of Stay: 6 days  Attending Physician: José Manuel Guidry MD  Primary Care Provider: Jose Valles MD    Isolation Status: No active isolations  Assessment/Plan:      E coli bacteremia  Ms. Garcia is an 85 yo female with E. Coli sepsis s/p sigmoid colectomy for bowel necrosis POD4.  She has multiple allergies to antibiotics, currently being treated with ertapenem; vanco discontinued 11/2;     Rec  Ok to streamline ertapenem to cefazolin per primary team  Add flagyl empirically since she had intraabdominal perforation  Watch abdominal exam carefully        Anticipated Disposition: unclear at this point     Thank you for your consult. I will follow-up with patient. Please contact us if you have any additional questions.[phone]    Ingrid Marte MD  Infectious Disease  [facility]    Subjective:     Principal Problem:Colon necrosis    HPI: Annette Garcia is an 84 year old white woman with peripheral vascular disease, renovascular hypertension, renal artery stenosis status post left renal artery stent placement on 7/19/17, coronary artery disease with history of myocardial infarction, diffuse large B cell lymphoma, anemia, epilepsy, hypothyroidism, chronic hyponatremia, depression, lumbar and sacroiliac joint osteoarthritis with chronic low back pain and history of lumbar spine surgery in 2013, slow transit constipation, history of appendectomy, history of cholecystectomy history hysterectomy, history of small bowel obstruction status post small bowel resection on 8/23/16.  She presented to the hospital with abdominal pain, difficulty urinating and constipation x 1 day.  In the ED, she was found to be in septic shock and required pressors.  CT performed there showed constipation and peritoneal free fluid.  Blood cultures growing pan-sensitive  E. Coli.  Patient was taken to the OR on 10/29 and underwent sigmoid colectomy with end colostomy for findings of sigmoid necrosis and large amounts of turbid fluid in abdomen.  Patient was kept intubated following surgery.  She was also found to by hyponatremic with altered mental status.  Patient remains intubated and on pressor support in ICU.  History is obtained by chart review.  She is currently being treated with ertapenem.   ID consult called on 11/1 for help with antibiotics.   11/2 extubated; stop vancomycin  11/3 stopped ertapenem and changed to cefazolin and flagyl        Interval History: Patient with no complaints today - doing well off the ventilator.     Review of Systems   Respiratory: Negative for shortness of breath.    Gastrointestinal: Negative for diarrhea, nausea and vomiting.     Antibiotics (From admission, onward)    Start     Stop Route Frequency Ordered    11/03/19 1615  metronidazole IVPB 500 mg      -- IV Every 8 hours (non-standard times) 11/03/19 1506    11/03/19 1045  ceFAZolin (ANCEF) 1 gram in dextrose 5 % 50 mL IVPB (premix)      -- IV Every 12 hours (non-standard times) 11/03/19 0939        Objective:     Vital Signs (Most Recent):  Temp: 99 °F (37.2 °C) (11/03/19 1500)  Pulse: 92 (11/03/19 1400)  Resp: 20 (11/03/19 1400)  BP: (!) 147/66 (11/03/19 1400)  SpO2: 96 % (11/03/19 1400) Vital Signs (24h Range):  Temp:  [96.8 °F (36 °C)-99.2 °F (37.3 °C)] 99 °F (37.2 °C)  Pulse:  [76-95] 92  Resp:  [20-32] 20  SpO2:  [92 %-100 %] 96 %  BP: (111-173)/() 147/66     Weight: 70.6 kg (155 lb 10.3 oz)  Body mass index is 30.4 kg/m².    Estimated Creatinine Clearance: 19 mL/min (A) (based on SCr of 2 mg/dL (H)).    Physical Exam   Cardiovascular: Normal heart sounds.   No murmur heard.  Pulmonary/Chest: Breath sounds normal. No respiratory distress.   Abdominal: Bowel sounds are normal. She exhibits no distension. There is no tenderness.   Has colostomy in place and abdominal wound looks  clean and dry   Musculoskeletal: She exhibits no edema.       Significant Labs:   Blood Culture:   Recent Labs   Lab 10/28/19  0049 19  1335 19  1516   LABBLOO Gram stain aer bottle: Gram negative rods   Results called to and read back by: Kinga Bateman RN  10/28/2019  19:01  ESCHERICHIA COLI* No Growth to date  No Growth to date No Growth to date  No Growth to date     CBC:   Recent Labs   Lab 19   WBC 10.84 10.26 9.69   HGB 8.3* 8.3* 8.0*   HCT 24.0* 24.6* 23.9*   PLT 41* 47* 59*     CMP:   Recent Labs   Lab 19  14119   *  127* 132* 134*  134* 135*  135*   K 4.3  4.3 3.8 3.8  3.8 3.8  3.8   CL 98  98 99 102  102 103  103   CO2 20*  20* 23 23  23 23  23   *  214* 223* 198*  198* 190*  190*   BUN 86*  86* 60* 54*  54* 66*  66*   CREATININE 2.7*  2.7* 1.7* 1.6*  1.6* 2.0*  2.0*   CALCIUM 7.3*  7.3* 7.8* 7.7*  7.7* 7.5*  7.5*   PROT 4.5*  --   --  4.4*   ALBUMIN 1.8* 1.9* 1.8*  1.8* 1.8*  1.8*  1.8*   BILITOT 0.9  --   --  0.6   ALKPHOS 79  --   --  117   AST 51*  --   --  50*   ALT 22  --   --  23   ANIONGAP 9  9 10 9  9 9  9   EGFRNONAA 16*  16* 28* 30*  30* 23*  23*     Urine Studies:   Recent Labs   Lab 10/28/19  1724   COLORU [place]*   APPEARANCEUA Clear   PHUR 5.0   SPECGRAV 1.025   PROTEINUA 2+*   GLUCUA Negative   KETONESU 1+*   BILIRUBINUA 2+*   OCCULTUA Trace*   NITRITE Positive*   UROBILINOGEN 2.0-3.0*   LEUKOCYTESUR Negative   RBCUA 10*   WBCUA 5   BACTERIA Many*   SQUAMEPITHEL 2   HYALINECASTS 0     Significant Imagin/1 cxr - Right internal jugular line has its tip in the superior vena cava.  Enteric tube approximately 3 cm above jesús level.  Additional tube/line appear unchanged.  No change in the cardiopulmonary status.

## 2019-11-03 NOTE — PROGRESS NOTES
[facility]  Cardiology  Progress Note    Patient Name: Annette Garcia  MRN: [id number]  Admission Date: 10/27/2019  Hospital Length of Stay: 6 days  Code Status: DNR   Attending Physician: José Manuel Guidry MD   Primary Care Physician: Jose Valles MD  Expected Discharge Date:   Principal Problem:Colon necrosis    Subjective:     Hospital Course:   10/27/2019-10/29/2019 Per HPI. Underwent exp lap per General surgery with sigmoidectomy with colostomy with large amounts of turbid, murky fluid in abdomen. Admitted to ICU and remained vented post operatively with continued pressor use. Placed on TPN. Blood cultures with 2 out of 4 GNR resembling Ecoli on Invanz and Vancomycin   10/30/2019 Cardiology consulted STAT due to presence of pauses with longest pause of 11 seconds with ROSC. BP stable on IV Levophed. TCP at rate of 100 and transitioned to IV Dopamine. Emergent TVP in cath lab   10/31/2019 H&H down to 7.4&21.8 this AM. BUN 62 creatinine 2.5 K+ 4.1 Mg 3.7 down from 4.1. TVP in place with intermittent V paced rhythm. Remains intubated. BP marginal on IV Dopamine  11/1/2019 Remains intubated and on IV Dopamine with increased requirements this AM due to MAP less than 60. TVP remains in place with NSR HR 80 currently. Episode overnight of V paced rhythm-will decrease TVP to 70bpm today.  K+ 4.8 Mg 3.6 BUN 83 creatinine 3.1. Minimal response with no sedative medications on board      11/2/2019  Overnight received PRBC transfusion with appropriate response. Tolerating CRRT well. No events on tele. Intermittent afib. Currently on SBT.     11/3/2019  Overnight no acute events. BP stable. No pacing overnight. Extubated and responsive.          Review of Systems   Constitution: Positive for malaise/fatigue.   HENT: Negative for congestion.    Eyes: Negative for blurred vision.   Cardiovascular: Positive for dyspnea on exertion. Negative for chest pain, claudication, cyanosis, irregular  heartbeat, leg swelling, near-syncope, orthopnea, palpitations, paroxysmal nocturnal dyspnea and syncope.   Respiratory: Positive for shortness of breath.    Endocrine: Negative for polyuria.   Hematologic/Lymphatic: Negative for bleeding problem.   Skin: Negative for itching and rash.   Musculoskeletal: Positive for back pain. Negative for joint swelling, muscle cramps and muscle weakness.   Gastrointestinal: Negative for abdominal pain, hematemesis, hematochezia, melena, nausea and vomiting.   Genitourinary: Negative for dysuria and hematuria.   Neurological: Negative for dizziness, focal weakness, headaches, light-headedness, loss of balance and weakness.   Psychiatric/Behavioral: Negative for depression. The patient is not nervous/anxious.      Objective:     Vital Signs (Most Recent):  Temp: 99.2 °F (37.3 °C) (11/03/19 1130)  Pulse: 92 (11/03/19 1000)  Resp: 20 (11/03/19 1000)  BP: (!) 152/70 (11/03/19 1000)  SpO2: 99 % (11/03/19 1000) Vital Signs (24h Range):  Temp:  [96.8 °F (36 °C)-99.2 °F (37.3 °C)] 99.2 °F (37.3 °C)  Pulse:  [74-95] 92  Resp:  [20-32] 20  SpO2:  [92 %-100 %] 99 %  BP: (133-174)/() 152/70     Weight: 70.6 kg (155 lb 10.3 oz)  Body mass index is 30.4 kg/m².    SpO2: 99 %  O2 Device (Oxygen Therapy): room air      Intake/Output Summary (Last 24 hours) at 11/3/2019 1146  Last data filed at 11/3/2019 0600  Gross per 24 hour   Intake 2477 ml   Output 2115 ml   Net 362 ml       Lines/Drains/Airways     Central Venous Catheter Line                 Port A Cath Single Lumen right subclavian -- days         Trialysis (Dialysis) Catheter 11/01/19 1708 right internal jugular 1 day          Drain                 Urethral Catheter 10/28/19  Non-latex 16 Fr. 6 days         Colostomy 10/29/19 1200 LLQ 5 days                Physical Exam   Constitutional: She is oriented to person, place, and time. She appears well-developed and well-nourished.   HENT:   Head: Normocephalic and atraumatic.   Neck:  Neck supple. No JVD present.   Cardiovascular: Normal rate and regular rhythm.   Murmur heard.   Systolic murmur is present with a grade of 2/6.  Pulses:       Carotid pulses are 2+ on the right side, and 2+ on the left side.       Radial pulses are 2+ on the right side, and 2+ on the left side.        Femoral pulses are 2+ on the right side, and 2+ on the left side.       Posterior tibial pulses are 1+ on the right side, and 1+ on the left side.   Pulmonary/Chest: Effort normal and breath sounds normal.   Abdominal: Soft. Bowel sounds are normal.   Musculoskeletal: She exhibits no edema.   Neurological: She is alert and oriented to person, place, and time.   Skin: Skin is warm and dry.   Psychiatric: She has a normal mood and affect. Her behavior is normal. Thought content normal.       Significant Labs:   ABG: No results for input(s): PH, PCO2, HCO3, POCSATURATED, BE in the last 48 hours., Blood Culture:   Recent Labs   Lab 11/01/19  1335 11/01/19  1516   LABBLOO No Growth to date  No Growth to date No Growth to date  No Growth to date   , BMP:   Recent Labs   Lab 11/02/19  1411 11/02/19 1952 11/03/19  0349   * 198*  198* 190*  190*   * 134*  134* 135*  135*   K 3.8 3.8  3.8 3.8  3.8   CL 99 102  102 103  103   CO2 23 23  23 23  23   BUN 60* 54*  54* 66*  66*   CREATININE 1.7* 1.6*  1.6* 2.0*  2.0*   CALCIUM 7.8* 7.7*  7.7* 7.5*  7.5*   MG 2.4 2.2 2.2   , CMP   Recent Labs   Lab 11/02/19  0430 11/02/19  1411 11/02/19 1952 11/03/19  0349   *  127* 132* 134*  134* 135*  135*   K 4.3  4.3 3.8 3.8  3.8 3.8  3.8   CL 98  98 99 102  102 103  103   CO2 20*  20* 23 23  23 23  23   *  214* 223* 198*  198* 190*  190*   BUN 86*  86* 60* 54*  54* 66*  66*   CREATININE 2.7*  2.7* 1.7* 1.6*  1.6* 2.0*  2.0*   CALCIUM 7.3*  7.3* 7.8* 7.7*  7.7* 7.5*  7.5*   PROT 4.5*  --   --  4.4*   ALBUMIN 1.8* 1.9* 1.8*  1.8* 1.8*  1.8*  1.8*   BILITOT 0.9  --   --  0.6    ALKPHOS 79  --   --  117   AST 51*  --   --  50*   ALT 22  --   --  23   ANIONGAP 9  9 10 9  9 9  9   ESTGFRAFRICA 18*  18* 32* 34*  34* 26*  26*   EGFRNONAA 16*  16* 28* 30*  30* 23*  23*   , CBC   Recent Labs   Lab 11/02/19  0430 11/02/19  1952 11/03/19  0349   WBC 10.84 10.26 9.69   HGB 8.3* 8.3* 8.0*   HCT 24.0* 24.6* 23.9*   PLT 41* 47* 59*   , INR No results for input(s): INR, PROTIME in the last 48 hours., Lipid Panel No results for input(s): CHOL, HDL, LDLCALC, TRIG, CHOLHDL in the last 48 hours. and Troponin No results for input(s): TROPONINI in the last 48 hours.      Assessment and Plan:         Active Diagnoses:    Diagnosis Date Noted POA    PRINCIPAL PROBLEM:  Colon necrosis [K55.049] 10/27/2019 Yes    Anemia of acute infection [D64.9] 11/01/2019 No    Thrombocytopenia [D69.6] 11/01/2019 No    Enteritis [K52.9]  Yes    Paroxysmal atrial fibrillation [I48.0] 10/30/2019 Yes    Sinus pause [I45.5] 10/30/2019 No    E coli bacteremia [R78.81] 10/29/2019 Yes    Abdominal pain [R10.9] 10/29/2019 Unknown    DIC (disseminated intravascular coagulation) [D65] 10/29/2019 Yes    Old MI (myocardial infarction) [I25.2] 10/28/2019 Not Applicable     Chronic    Peripheral vascular disease, unspecified [I73.9] 10/28/2019 Yes     Chronic    Slow transit constipation [K59.01] 10/28/2019 Yes     Chronic    Depression [F32.9] 10/17/2019 Yes    History of stent insertion of left renal artery 7/19/17 [Z98.890] 07/19/2017 Not Applicable     Chronic    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 coli septic shock [A41.51, R65.21] 10/28/2019 11/02/2019 Yes    Abnormal liver enzymes [R74.8] 10/28/2019 11/02/2019 Yes    Fecal obstruction [K56.41]  11/02/2019 Yes     Sinus pause  -sinus pause with no escape rhythm; longest episode approximately 11 seconds on 10/30/2019  -Lytes WNL  -no recent use of BB or CCB  -initially TCP at HR of 100; TVP placed with HR 80 and MA 10;   -Dopa off now and good BP  -TVP rate dropped to 50, pt currently in SR  -will continue to follow and continue TVP for now - if no events overnight will D/C in AM  -Made DNR     Paroxysmal atrial fibrillation  -no history of afib  -no admission EKG; EKG on 10/30 with afib with RVR with   -on BB at home for HTN management; on hold since admission (10/27) due to acute issues  -no BB or CCB due to sinus pauses  -current appears NSR   -no chronic AC due to recent surgical intervention and risk of bleeding    VTE Risk Mitigation (From admission, onward)         Ordered     heparin (porcine) injection 5,000 Units  Every 8 hours      11/02/19 1447     heparin, porcine (PF) 100 unit/mL injection flush 1,000 Units  As needed (PRN)      11/02/19 2049     IP VTE LOW RISK PATIENT  Once      10/30/19 0811     Place sequential compression device  Until discontinued      10/28/19 0607                Timmy Simmons MD  Cardiology  [facility]

## 2019-11-03 NOTE — PLAN OF CARE
Pt remained off pressors, was oriented, but stated confusing things at times. States she is tired today. TPN DC'd. Tube feeds DC'd. Started on Full liquid diet. Tolerated well when sitting upright.

## 2019-11-03 NOTE — PROGRESS NOTES
[facility] Hospital Medicine  Progress Note    Patient Name: Annette Garcia  MRN: [id number]  Patient Class: IP- Inpatient   Admission Date: 10/27/2019  Length of Stay: 6 days  Attending Physician: José Manuel Guidry MD  Primary Care Provider: Jose Valles MD        Subjective:     Principal Problem:Colon necrosis        HPI:  Annette Garcia is an 84 year old white woman with peripheral vascular disease, renovascular hypertension, renal artery stenosis status post left renal artery stent placement on 7/19/17, coronary artery disease with history of myocardial infarction, diffuse large B cell lymphoma, anemia, epilepsy, hypothyroidism, chronic hyponatremia, depression, lumbar and sacroiliac joint osteoarthritis with chronic low back pain and history of lumbar spine surgery in 2013, slow transit constipation, history of appendectomy, history of cholecystectomy history hysterectomy, history of small bowel obstruction status post small bowel resection on 8/23/16. She lives in [place], [place]. Her son has epilepsy. Her daughter has tuberous sclerosis. Her primary care physician is Dr. Jose Torres. Her pain management specialist is Dr. Chirag Bates. Her neurologist is Dr. Gamal Lock.    She presented to [facility] on 10/27/19 with abdominal pain, difficulty urinating, and constipation for one day. She took polyethylene glycol and senna-docusate without relief. She strained during her last attempt at having a bowel movement. She had one episode of vomiting on the day of presentation. She felt weak when walking. In the emergency department, she was found to have acute kidney injury (BUN 27, creatinine 1.6, from 9 and 0.7 on 8/29/19), elevated transaminases ( and , from 16 and 11 on 8/29/19), and lactic acidosis (5.8). Contrast CT showed acute enteritis, sigmoid and rectal constipation, and some peritoneal free fluid in the pericolic  magdy.she was given lactated ringers, cefepime, and metronidazole. She required norepinephrine for septic shock. She was admitted to [facility].    Overview/Hospital Course:  Blood cultures grew pan-sensitive Escherichia coli. Due to history of bowel resection for obstruction, General Surgery was consulted for her bowel obstruction. She was put on amikacin and cefepime. 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 She was kept intubated postoperatively. Hospital Medicine changed amikacin to metronidazole, then later stopped metronidazole and changed cefepime to meropenem. She was given total parenteral nutrition. She became oliguric but responded well to a dose of furosemide, so Nephrology gave her doses of bumetanide. She developed thrombocytopenia. She developed atrial fibrillation and sinus pauses, so Cardiology was consulted and placed a temporary pacemaker. Echocardiogram only showed concentric remodeling and mild mitral regurgitation. Norepinephrine was changed to dopamine. EEG showed triphasic waves bilaterally consistent with metabolic encephalopathy. The epileptologist recommended treating her hyponatremia and checking levels of her oxcarbazepine and phenobarbital. She required pRBC transfusion. Nephrology improved her hyponatremia, and she became alert. She was weaned off vasopressors and was extubated the morning of 11/2/19. Surgery stopped TPN and started full liquid diet on 11/3/19.     Interval History: Doing well. I discussed the plan with Cardiology, who thinks temporary pacemaker can be removed tomorrow.    Review of Systems   Constitutional: Negative for chills and fever.   Gastrointestinal: Negative for nausea and vomiting.     Objective:     Vital Signs (Most Recent):  Temp: 99.2 °F (37.3 °C) (11/03/19 1130)  Pulse: 88 (11/03/19 1200)  Resp: (!) 28 (11/03/19 1200)  BP: (!) 111/56 (11/03/19  1200)  SpO2: 99 % (11/03/19 1200) Vital Signs (24h Range):  Temp:  [96.8 °F (36 °C)-99.2 °F (37.3 °C)] 99.2 °F (37.3 °C)  Pulse:  [75-95] 88  Resp:  [20-32] 28  SpO2:  [92 %-100 %] 99 %  BP: (111-174)/() 111/56     Weight: 70.6 kg (155 lb 10.3 oz)  Body mass index is 30.4 kg/m².    Intake/Output Summary (Last 24 hours) at 11/3/2019 1242  Last data filed at 11/3/2019 1200  Gross per 24 hour   Intake 2577 ml   Output 2127 ml   Net 450 ml      Physical Exam   Constitutional: She is oriented to person, place, and time. She appears well-developed. No distress.   Pulmonary/Chest: Effort normal. No respiratory distress.   Neurological: She is alert and oriented to person, place, and time.   Psychiatric: She has a normal mood and affect.   Nursing note and vitals reviewed.      Significant Labs: All pertinent labs within the past 24 hours have been reviewed.    Significant Imaging: I have reviewed all pertinent imaging results/findings within the past 24 hours.      Assessment/Plan:      * Colon necrosis  E. coli bacteremia  Atrial fibrillation  Acute tubular necrosis  Sinus pauses  DIC (disseminated intravascular coagulation)  Change ertapenem to cefazolin, and can change to cephalexin when eating. Status post sigmoidectomy. Multi-organ failure with signs of improvement, however renal function worse today, likely due to fluid volume depletion, so encourage oral intake now that Surgery is okay with it. Temporary pacemaker on. Appreciate General Surgery, Pulmonology, Cardiology, Nephrology.    Anemia of acute infection  Transfused pRBCs.    Slow transit constipation  Takes polyethylene glycol at home.    Peripheral vascular disease, unspecified  Hyperlipidemia   Takes pravastatin.    Old MI (myocardial infarction)  Takes pravastatin.    Depression  Continue home mirtazapine 7.5mg HS.    Anemia due to antineoplastic chemotherapy  Stable.    Chronic hyponatremia  Nephrology changed fluids in medications to normal saline.  Improving toward baseline.    DLBCL (diffuse large B cell lymphoma)  Follow up outpatient.    Acquired hypothyroidism  Continue home levothyroxine 125 mcg before breakfast.    Epilepsy  Continue home oxycarbazepine 300 mg nightly, phenobarbital 64.8 mg nightly.    Renovascular hypertension  Hold home lisinopril, clonidine, carvedilol.      VTE Risk Mitigation (From admission, onward)         Ordered     heparin (porcine) injection 5,000 Units  Every 8 hours      11/02/19 1447     heparin, porcine (PF) 100 unit/mL injection flush 1,000 Units  As needed (PRN)      11/02/19 2049     IP VTE LOW RISK PATIENT  Once      10/30/19 0811     Place sequential compression device  Until discontinued      10/28/19 0607                Critical care time spent on the evaluation and treatment of severe organ dysfunction, review of pertinent labs and imaging studies, discussions with consulting providers and discussions with patient/family: 35 minutes.    Can keep in the ICU until temporary pacemaker removed tomorrow then downgrade to telemetry bed.      José Manuel Guidry MD  Department of Hospital Medicine   [facility]

## 2019-11-03 NOTE — SUBJECTIVE & OBJECTIVE
Interval History: Doing well. I discussed the plan with Cardiology, who thinks temporary pacemaker can be removed tomorrow.    Review of Systems   Constitutional: Negative for chills and fever.   Gastrointestinal: Negative for nausea and vomiting.     Objective:     Vital Signs (Most Recent):  Temp: 99.2 °F (37.3 °C) (11/03/19 1130)  Pulse: 88 (11/03/19 1200)  Resp: (!) 28 (11/03/19 1200)  BP: (!) 111/56 (11/03/19 1200)  SpO2: 99 % (11/03/19 1200) Vital Signs (24h Range):  Temp:  [96.8 °F (36 °C)-99.2 °F (37.3 °C)] 99.2 °F (37.3 °C)  Pulse:  [75-95] 88  Resp:  [20-32] 28  SpO2:  [92 %-100 %] 99 %  BP: (111-174)/() 111/56     Weight: 70.6 kg (155 lb 10.3 oz)  Body mass index is 30.4 kg/m².    Intake/Output Summary (Last 24 hours) at 11/3/2019 1242  Last data filed at 11/3/2019 1200  Gross per 24 hour   Intake 2577 ml   Output 2127 ml   Net 450 ml      Physical Exam   Constitutional: She is oriented to person, place, and time. She appears well-developed. No distress.   Pulmonary/Chest: Effort normal. No respiratory distress.   Neurological: She is alert and oriented to person, place, and time.   Psychiatric: She has a normal mood and affect.   Nursing note and vitals reviewed.      Significant Labs: All pertinent labs within the past 24 hours have been reviewed.    Significant Imaging: I have reviewed all pertinent imaging results/findings within the past 24 hours.

## 2019-11-03 NOTE — ASSESSMENT & PLAN NOTE
E. coli bacteremia  Atrial fibrillation  Acute tubular necrosis  Sinus pauses  DIC (disseminated intravascular coagulation)  Change ertapenem to cefazolin, and can change to cephalexin when eating. Status post sigmoidectomy. Multi-organ failure with signs of improvement, however renal function worse today, likely due to fluid volume depletion, so encourage oral intake now that Surgery is okay with it. Temporary pacemaker on. Appreciate General Surgery, Pulmonology, Cardiology, Nephrology.

## 2019-11-03 NOTE — PLAN OF CARE
Brief Hematology/Oncology Update:    Labs have been reviewed.    Lab Results   Component Value Date    WBC 9.69 11/03/2019    HGB 8.0 (L) 11/03/2019    HCT 23.9 (L) 11/03/2019    MCV 96 11/03/2019    PLT 59 (L) 11/03/2019       1. Thrombocytopenia / anemia of acute infection.  - Labs have been reviewed.  - hemoglobin today is 8 g/dL. Platelet count has improved to 59 k/uL.  - iron studies (10/31/19) revealed a decreased total iron binding capacity. This is consistent with anemia of acute infection/inflammation/hospitalization.  - she is receiving multiple antibiotics that can cause drug-induced thrombocytopenia. Consider narrowing/switching antibiotics if platelet count decreases again.    2. DLBCL (diffuse large B cell lymphoma)  - patient of Dr. Arredondo. She completed 6 cycles of R-CHOP; last dose in January 2017.  - pathology from surgery (10/29/19) was negative for lymphoma  - no signs of recurrent disease    Charles Reeves M.D.  Hematology/Oncology  [facility] - [address], [address]  [place] [address]  Phone: [phone]  Fax: [phone]

## 2019-11-03 NOTE — NURSING
Notified Dr. Vale that air got in arterial access. Was unable to return blood. CRRT discontinued at this time. Received orders to draw stat labs and call back with results to determine if will continue CRRT tonight. Will implement orders.

## 2019-11-03 NOTE — PROGRESS NOTES
Progress Note  Nephrology      Consult Requested By: José Manuel Guidry MD      SUBJECTIVE:     Overnight events  Patient is a 84 y.o. female     Patient Active Problem List   Diagnosis    Renovascular hypertension    Epilepsy    Acquired hypothyroidism    Osteoarthritis of lumbar spine    Degenerative joint disease of sacroiliac joint    S/P exploratory laparotomy    DLBCL (diffuse large B cell lymphoma)    Chronic hyponatremia    Anemia due to antineoplastic chemotherapy    Bilateral renal artery stenosis    Closed comminuted fracture of right humerus    Closed displaced fracture of distal phalanx of right little finger    Age-related osteoporosis with current pathological fracture with routine healing    Closed wedge compression fracture of eleventh thoracic vertebra with routine healing    DDD (degenerative disc disease), lumbar    Chronic bilateral low back pain without sciatica    Age-related osteoporosis without current pathological fracture    Vitamin D deficiency    Uncontrolled hypertension    Subjective memory complaints    Depression    Old MI (myocardial infarction)    Peripheral vascular disease, unspecified    History of stent insertion of left renal artery 7/19/17    Colon necrosis    Slow transit constipation    E coli bacteremia    Abdominal pain    Paroxysmal atrial fibrillation    Sinus pause    DIC (disseminated intravascular coagulation)    Anemia of acute infection    Thrombocytopenia    Enteritis     Past Medical History:   Diagnosis Date    Cancer     colon    Hypertension     Petit mal epilepsy 1954    Scoliosis of lumbar spine     Seizures     Unspecified hypothyroidism               OBJECTIVE:     Vitals:    11/03/19 1200 11/03/19 1300 11/03/19 1400 11/03/19 1500   BP: (!) 111/56 133/60 (!) 147/66    Pulse: 88 89 92    Resp: (!) 28 20 20    Temp:    99 °F (37.2 °C)   TempSrc:    Axillary   SpO2: 99% 98% 96%    Weight:       Height:           Temp: 99  °F (37.2 °C) (11/03/19 1500)  Pulse: 92 (11/03/19 1400)  Resp: 20 (11/03/19 1400)  BP: (!) 147/66 (11/03/19 1400)  SpO2: 96 % (11/03/19 1400)    Date 11/03/19 0700 - 11/04/19 0659   Shift 2265-1047 2967-2801 6090-8509 24 Hour Total   INTAKE   P.O. 115   115   NG/GT 50   50   IV Piggyback 50   50      360   Shift Total(mL/kg) 575(8.1)   575(8.1)   OUTPUT   Urine(mL/kg/hr) 275(0.5)   275   Shift Total(mL/kg) 275(3.9)   275(3.9)   Weight (kg) 70.6 70.6 70.6 70.6             Medications:   ceFAZolin (ANCEF) IVPB  1 g Intravenous Q12H    heparin (porcine)  5,000 Units Subcutaneous Q8H    levothyroxine  125 mcg Oral Before breakfast    metronidazole  500 mg Intravenous Q8H    OXcarbazepine  300 mg Oral QHS    PHENobarbital  64.8 mg Oral Nightly    pravastatin  20 mg Oral Daily                 Physical Exam:  Weak  No SOB  Lungs: diminished breath sounds  Heart: pulse 90  /66  Abdomen: soft  Extremities:edema   Skin: dry  Laboratory:  ABG  Labs reviewed  Recent Results (from the past 336 hour(s))   Basic metabolic panel    Collection Time: 11/02/19  7:52 PM   Result Value Ref Range    Sodium 134 (L) 136 - 145 mmol/L    Potassium 3.8 3.5 - 5.1 mmol/L    Chloride 102 95 - 110 mmol/L    CO2 23 23 - 29 mmol/L    BUN, Bld 54 (H) 8 - 23 mg/dL    Creatinine 1.6 (H) 0.5 - 1.4 mg/dL    Calcium 7.7 (L) 8.7 - 10.5 mg/dL    Anion Gap 9 8 - 16 mmol/L   Basic metabolic panel    Collection Time: 11/02/19  4:30 AM   Result Value Ref Range    Sodium 127 (L) 136 - 145 mmol/L    Potassium 4.3 3.5 - 5.1 mmol/L    Chloride 98 95 - 110 mmol/L    CO2 20 (L) 23 - 29 mmol/L    BUN, Bld 86 (H) 8 - 23 mg/dL    Creatinine 2.7 (H) 0.5 - 1.4 mg/dL    Calcium 7.3 (L) 8.7 - 10.5 mg/dL    Anion Gap 9 8 - 16 mmol/L   Basic metabolic panel    Collection Time: 11/01/19  2:54 PM   Result Value Ref Range    Sodium 122 (L) 136 - 145 mmol/L    Potassium 4.9 3.5 - 5.1 mmol/L    Chloride 90 (L) 95 - 110 mmol/L    CO2 22 (L) 23 - 29 mmol/L     BUN, Bld 96 (H) 8 - 23 mg/dL    Creatinine 3.1 (H) 0.5 - 1.4 mg/dL    Calcium 8.0 (L) 8.7 - 10.5 mg/dL    Anion Gap 10 8 - 16 mmol/L     Recent Results (from the past 336 hour(s))   CBC auto differential    Collection Time: 11/03/19  3:49 AM   Result Value Ref Range    WBC 9.69 3.90 - 12.70 K/uL    Hemoglobin 8.0 (L) 12.0 - 16.0 g/dL    Hematocrit 23.9 (L) 37.0 - 48.5 %    Platelets 59 (L) 150 - 350 K/uL   CBC auto differential    Collection Time: 11/02/19  7:52 PM   Result Value Ref Range    WBC 10.26 3.90 - 12.70 K/uL    Hemoglobin 8.3 (L) 12.0 - 16.0 g/dL    Hematocrit 24.6 (L) 37.0 - 48.5 %    Platelets 47 (L) 150 - 350 K/uL   CBC with Automated Differential    Collection Time: 11/02/19  4:30 AM   Result Value Ref Range    WBC 10.84 3.90 - 12.70 K/uL    Hemoglobin 8.3 (L) 12.0 - 16.0 g/dL    Hematocrit 24.0 (L) 37.0 - 48.5 %    Platelets 41 (L) 150 - 350 K/uL     Urinalysis  No results for input(s): COLORU, CLARITYU, SPECGRAV, PHUR, PROTEINUA, GLUCOSEU, BILIRUBINCON, BLOODU, WBCU, RBCU, BACTERIA, MUCUS, NITRITE, LEUKOCYTESUR, UROBILINOGEN, HYALINECASTS in the last 24 hours.    Diagnostic Results:  X-Ray: Reviewed  US: Reviewed  Echo: Reviewed  ACCESS    ASSESSMENT/PLAN:   JAS  UO 1200 cc  Positive fluid balance 6,311 cc since admit  Creatinine 2  Azotemia  BUN 66  Metabolic bone disease  Calcium corrected 9.1  Phosphorus 2.5  Malnutrition.  Albumin 1.8  Supplements.  Anemia Hb 8  Platelets 59  Blood pressure 147/66  Will repeat BMP, phos and mag now.  Will follow up.  Weight daily.  I and O

## 2019-11-04 PROBLEM — R33.8 ACUTE URINARY RETENTION: Status: ACTIVE | Noted: 2019-11-04

## 2019-11-04 LAB
ALBUMIN SERPL BCP-MCNC: 1.8 G/DL (ref 3.5–5.2)
ALBUMIN SERPL BCP-MCNC: 1.8 G/DL (ref 3.5–5.2)
ALP SERPL-CCNC: 133 U/L (ref 55–135)
ALT SERPL W/O P-5'-P-CCNC: 29 U/L (ref 10–44)
ANION GAP SERPL CALC-SCNC: 10 MMOL/L (ref 8–16)
AST SERPL-CCNC: 63 U/L (ref 10–40)
BASOPHILS # BLD AUTO: 0.02 K/UL (ref 0–0.2)
BASOPHILS NFR BLD: 0.2 % (ref 0–1.9)
BILIRUB SERPL-MCNC: 0.5 MG/DL (ref 0.1–1)
BILIRUB UR QL STRIP: NEGATIVE
BUN SERPL-MCNC: 81 MG/DL (ref 8–23)
CALCIUM SERPL-MCNC: 7 MG/DL (ref 8.7–10.5)
CHLORIDE SERPL-SCNC: 104 MMOL/L (ref 95–110)
CLARITY UR: CLEAR
CO2 SERPL-SCNC: 22 MMOL/L (ref 23–29)
COLOR UR: YELLOW
CREAT SERPL-MCNC: 2.7 MG/DL (ref 0.5–1.4)
DIFFERENTIAL METHOD: ABNORMAL
EOSINOPHIL # BLD AUTO: 0.1 K/UL (ref 0–0.5)
EOSINOPHIL NFR BLD: 0.9 % (ref 0–8)
ERYTHROCYTE [DISTWIDTH] IN BLOOD BY AUTOMATED COUNT: 14.8 % (ref 11.5–14.5)
ERYTHROCYTE [DISTWIDTH] IN BLOOD BY AUTOMATED COUNT: 14.9 % (ref 11.5–14.5)
EST. GFR  (AFRICAN AMERICAN): 18 ML/MIN/1.73 M^2
EST. GFR  (NON AFRICAN AMERICAN): 16 ML/MIN/1.73 M^2
GLUCOSE SERPL-MCNC: 98 MG/DL (ref 70–110)
GLUCOSE UR QL STRIP: NEGATIVE
HCT VFR BLD AUTO: 21.4 % (ref 37–48.5)
HCT VFR BLD AUTO: 22 % (ref 37–48.5)
HGB BLD-MCNC: 7 G/DL (ref 12–16)
HGB BLD-MCNC: 7.1 G/DL (ref 12–16)
HGB UR QL STRIP: ABNORMAL
KETONES UR QL STRIP: NEGATIVE
LEUKOCYTE ESTERASE UR QL STRIP: NEGATIVE
LYMPHOCYTES # BLD AUTO: 0.6 K/UL (ref 1–4.8)
LYMPHOCYTES NFR BLD: 4.6 % (ref 18–48)
MAGNESIUM SERPL-MCNC: 2.1 MG/DL (ref 1.6–2.6)
MCH RBC QN AUTO: 32.4 PG (ref 27–31)
MCH RBC QN AUTO: 32.6 PG (ref 27–31)
MCHC RBC AUTO-ENTMCNC: 32.3 G/DL (ref 32–36)
MCHC RBC AUTO-ENTMCNC: 32.7 G/DL (ref 32–36)
MCV RBC AUTO: 101 FL (ref 82–98)
MCV RBC AUTO: 99 FL (ref 82–98)
MICROSCOPIC COMMENT: NORMAL
MONOCYTES # BLD AUTO: 0.8 K/UL (ref 0.3–1)
MONOCYTES NFR BLD: 6.6 % (ref 4–15)
NEUTROPHILS # BLD AUTO: 10.5 K/UL (ref 1.8–7.7)
NEUTROPHILS NFR BLD: 87.7 % (ref 38–73)
NITRITE UR QL STRIP: NEGATIVE
OXCARBAZEPINE METABOLITE: 4 MCG/ML (ref 3–35)
PH UR STRIP: 6 [PH] (ref 5–8)
PHOSPHATE SERPL-MCNC: 3.3 MG/DL (ref 2.7–4.5)
PLATELET # BLD AUTO: 77 K/UL (ref 150–350)
PLATELET # BLD AUTO: 78 K/UL (ref 150–350)
PMV BLD AUTO: 11.5 FL (ref 9.2–12.9)
PMV BLD AUTO: 11.9 FL (ref 9.2–12.9)
POTASSIUM SERPL-SCNC: 3.8 MMOL/L (ref 3.5–5.1)
PROT SERPL-MCNC: 4.2 G/DL (ref 6–8.4)
PROT UR QL STRIP: NEGATIVE
RBC # BLD AUTO: 2.16 M/UL (ref 4–5.4)
RBC # BLD AUTO: 2.18 M/UL (ref 4–5.4)
RBC #/AREA URNS HPF: 2 /HPF (ref 0–4)
SODIUM SERPL-SCNC: 136 MMOL/L (ref 136–145)
SP GR UR STRIP: 1.01 (ref 1–1.03)
URN SPEC COLLECT METH UR: ABNORMAL
UROBILINOGEN UR STRIP-ACNC: NEGATIVE EU/DL
WBC # BLD AUTO: 10.66 K/UL (ref 3.9–12.7)
WBC # BLD AUTO: 12.26 K/UL (ref 3.9–12.7)

## 2019-11-04 PROCEDURE — 97803 MED NUTRITION INDIV SUBSEQ: CPT

## 2019-11-04 PROCEDURE — 63600175 PHARM REV CODE 636 W HCPCS: Performed by: STUDENT IN AN ORGANIZED HEALTH CARE EDUCATION/TRAINING PROGRAM

## 2019-11-04 PROCEDURE — 90945 DIALYSIS ONE EVALUATION: CPT

## 2019-11-04 PROCEDURE — 83735 ASSAY OF MAGNESIUM: CPT

## 2019-11-04 PROCEDURE — 80100008 HC CRRT DAILY MAINTENANCE

## 2019-11-04 PROCEDURE — 25000003 PHARM REV CODE 250: Performed by: EMERGENCY MEDICINE

## 2019-11-04 PROCEDURE — 63600175 PHARM REV CODE 636 W HCPCS: Performed by: HOSPITALIST

## 2019-11-04 PROCEDURE — 97110 THERAPEUTIC EXERCISES: CPT

## 2019-11-04 PROCEDURE — 99233 SBSQ HOSP IP/OBS HIGH 50: CPT | Mod: ,,, | Performed by: INTERNAL MEDICINE

## 2019-11-04 PROCEDURE — 81000 URINALYSIS NONAUTO W/SCOPE: CPT

## 2019-11-04 PROCEDURE — 25000003 PHARM REV CODE 250: Performed by: NURSE PRACTITIONER

## 2019-11-04 PROCEDURE — 80053 COMPREHEN METABOLIC PANEL: CPT

## 2019-11-04 PROCEDURE — S0030 INJECTION, METRONIDAZOLE: HCPCS | Performed by: INTERNAL MEDICINE

## 2019-11-04 PROCEDURE — 25000003 PHARM REV CODE 250: Performed by: INTERNAL MEDICINE

## 2019-11-04 PROCEDURE — 93010 EKG 12-LEAD: ICD-10-PCS | Mod: ,,, | Performed by: STUDENT IN AN ORGANIZED HEALTH CARE EDUCATION/TRAINING PROGRAM

## 2019-11-04 PROCEDURE — 84100 ASSAY OF PHOSPHORUS: CPT

## 2019-11-04 PROCEDURE — 93005 ELECTROCARDIOGRAM TRACING: CPT

## 2019-11-04 PROCEDURE — 27202415 HC CARTRIDGE, CRRT

## 2019-11-04 PROCEDURE — 85027 COMPLETE CBC AUTOMATED: CPT

## 2019-11-04 PROCEDURE — 63600175 PHARM REV CODE 636 W HCPCS: Performed by: INTERNAL MEDICINE

## 2019-11-04 PROCEDURE — 20000000 HC ICU ROOM

## 2019-11-04 PROCEDURE — 93010 ELECTROCARDIOGRAM REPORT: CPT | Mod: ,,, | Performed by: STUDENT IN AN ORGANIZED HEALTH CARE EDUCATION/TRAINING PROGRAM

## 2019-11-04 PROCEDURE — 85025 COMPLETE CBC W/AUTO DIFF WBC: CPT

## 2019-11-04 PROCEDURE — 99233 PR SUBSEQUENT HOSPITAL CARE,LEVL III: ICD-10-PCS | Mod: ,,, | Performed by: INTERNAL MEDICINE

## 2019-11-04 PROCEDURE — 94761 N-INVAS EAR/PLS OXIMETRY MLT: CPT

## 2019-11-04 RX ORDER — HEPARIN SODIUM 1000 [USP'U]/ML
INJECTION, SOLUTION INTRAVENOUS; SUBCUTANEOUS
Status: DISPENSED
Start: 2019-11-04 | End: 2019-11-05

## 2019-11-04 RX ADMIN — HEPARIN SODIUM 5000 UNITS: 5000 INJECTION, SOLUTION INTRAVENOUS; SUBCUTANEOUS at 02:11

## 2019-11-04 RX ADMIN — CEFAZOLIN SODIUM 1 G: 1 SOLUTION INTRAVENOUS at 11:11

## 2019-11-04 RX ADMIN — METRONIDAZOLE 500 MG: 500 INJECTION, SOLUTION INTRAVENOUS at 03:11

## 2019-11-04 RX ADMIN — HEPARIN SODIUM 5000 UNITS: 5000 INJECTION, SOLUTION INTRAVENOUS; SUBCUTANEOUS at 05:11

## 2019-11-04 RX ADMIN — PHENOBARBITAL 64.8 MG: 32.4 TABLET ORAL at 08:11

## 2019-11-04 RX ADMIN — LEVOTHYROXINE SODIUM 125 MCG: 25 TABLET ORAL at 05:11

## 2019-11-04 RX ADMIN — HEPARIN SODIUM 5000 UNITS: 5000 INJECTION, SOLUTION INTRAVENOUS; SUBCUTANEOUS at 10:11

## 2019-11-04 RX ADMIN — IRON SUCROSE 100 MG: 20 INJECTION, SOLUTION INTRAVENOUS at 09:11

## 2019-11-04 RX ADMIN — SODIUM PHOSPHATE, MONOBASIC, MONOHYDRATE 30 MMOL: 276; 142 INJECTION, SOLUTION INTRAVENOUS at 10:11

## 2019-11-04 RX ADMIN — OXCARBAZEPINE 300 MG: 150 TABLET, FILM COATED ORAL at 08:11

## 2019-11-04 RX ADMIN — METRONIDAZOLE 500 MG: 500 INJECTION, SOLUTION INTRAVENOUS at 08:11

## 2019-11-04 RX ADMIN — PRAVASTATIN SODIUM 20 MG: 20 TABLET ORAL at 09:11

## 2019-11-04 RX ADMIN — CEFAZOLIN SODIUM 1 G: 1 SOLUTION INTRAVENOUS at 10:11

## 2019-11-04 NOTE — NURSING
[employer]Stage machine chnaged out due to reported venous air alarm. Helpline called and cartridge saved for return to [employer] for quality testing. Dr. Crandall notified. CRRT running without difficulty with CVC lines reversed at this time. Report given to Carola Munoz RN.

## 2019-11-04 NOTE — NURSING
Notified MICHELL Tejada that patient has not voided since gonzales removed at 1330 during the day. Bladder scan showed 122cc. Received orders to do I/O if bladder scan shows > 300cc. Will cont to monitor.

## 2019-11-04 NOTE — PROGRESS NOTES
[facility]  General Surgery  Progress Note    Subjective:     History of Present Illness:  No notes on file    Post-Op Info:  Procedure(s) (LRB):  Insertion, Pacemaker, Temporary Transvenous (Right)   5 Days Post-Op     Interval History: No acute events overnight. VSS, AF. Breathing comfortably on RA. Having ostomy function; stoma with dark color. Positive flatus from stoma as well. Some liquid stool from rectum. Tolerating meds via po route with sips of water. Off all pressors, off CRRT. Required in/out after urinary retention noted overnight-U/A sent. Elevation in creatinine noted on am labs (2.3 to 2.7).     Medications:  Continuous Infusions:    Scheduled Meds:   ceFAZolin (ANCEF) IVPB  1 g Intravenous Q12H    heparin (porcine)  5,000 Units Subcutaneous Q8H    iron sucrose  100 mg Intravenous Every Mon, Wed, Fri    levothyroxine  125 mcg Oral Before breakfast    metronidazole  500 mg Intravenous Q8H    OXcarbazepine  300 mg Oral QHS    PHENobarbital  64.8 mg Oral Nightly    pravastatin  20 mg Oral Daily     PRN Meds:sodium chloride, acetaminophen, albuterol-ipratropium, bumetanide, heparin, porcine (PF), morphine, ondansetron, promethazine (PHENERGAN) IVPB, ramelteon, sodium chloride 0.9%     Review of patient's allergies indicates:   Allergen Reactions    Adhesive Itching and Blisters    Penicillins Anaphylaxis    Tramadol Hives    Avelox [moxifloxacin] Rash     Facial and arm itching and redness. Pt states throat closes when given.    Amoxil [amoxicillin]     Aspridrox [aspirin, buffered]     Codeine Other (See Comments)     Throat swelling    Keflex [cephalexin]      Tolerated cefepime    Norvasc [amlodipine]     Red dye Hives    Robitussin [guaifenesin]     Sulfa (sulfonamide antibiotics)     Tylenol [acetaminophen]      Has reaction to Tylenol with red dye and unable to take Extra Strength Tylenol/ CAN ONLY TOLERATE REG STRENGTH TYLENOL    Vicks vaporub  [camphor-eucalyptus oil-menthol]      Objective:     Vital Signs (Most Recent):  Temp: 98.6 °F (37 °C) (11/04/19 0300)  Pulse: 110 (11/04/19 0600)  Resp: (!) 23 (11/04/19 0600)  BP: 131/62 (11/04/19 0600)  SpO2: 100 % (11/04/19 0600) Vital Signs (24h Range):  Temp:  [97.4 °F (36.3 °C)-99.2 °F (37.3 °C)] 98.6 °F (37 °C)  Pulse:  [] 110  Resp:  [20-32] 23  SpO2:  [96 %-100 %] 100 %  BP: ()/(45-70) 131/62     Weight: 70.6 kg (155 lb 10.3 oz)  Body mass index is 30.4 kg/m².    Intake/Output - Last 3 Shifts       11/02 0700 - 11/03 0659 11/03 0700 - 11/04 0659    P.O. 50 990    Other  60    NG/ 50    IV Piggyback 350 300    TPN 2297 360    Total Intake(mL/kg) 2827 (40) 1760 (24.9)    Urine (mL/kg/hr) 1200 (0.7) 975 (0.6)    Other 1369     Stool 350 150    Total Output 2919 1125    Net -92 +635          Stool Occurrence 3 x 3 x          Physical Exam   Constitutional: She appears well-developed. She appears lethargic. No distress.   Cardiovascular: Normal rate and regular rhythm.   Abdominal: Soft. There is no guarding.   Ostomy with stool/gas in bag. Ostomy mildly edematous, dusky in appearance   Musculoskeletal: She exhibits edema. She exhibits no tenderness.   Neurological: She appears lethargic.   Nursing note and vitals reviewed.      Significant Labs:  CBC:   Recent Labs   Lab 11/04/19 0339   WBC 10.66   RBC 2.16*   HGB 7.0*   HCT 21.4*   PLT 78*   MCV 99*   MCH 32.4*   MCHC 32.7     CMP:   Recent Labs   Lab 11/04/19 0339   GLU 98   CALCIUM 7.0*   ALBUMIN 1.8*  1.8*   PROT 4.2*      K 3.8   CO2 22*      BUN 81*   CREATININE 2.7*   ALKPHOS 133   ALT 29   AST 63*   BILITOT 0.5         Significant Diagnostics:  I have reviewed all pertinent imaging results/findings within the past 24 hours.    Assessment/Plan:     Abdominal pain  84-year-old female s/p sigmoid colectomy with end colostomy 10/30/19.      continue abx  PT/OT  Continue full liquid diet, boost, suplena   stop TPN  Follow up  results of U/A  If urinary retention continues may require replacement of gonzales          Selam Hanson MD  General Surgery  [facility]

## 2019-11-04 NOTE — SUBJECTIVE & OBJECTIVE
Interval History: Abdominal pain. Improving overall except for renal function. Still getting CRRT.    Review of Systems   Constitutional: Negative for chills and fever.   Gastrointestinal: Negative for nausea and vomiting.     Objective:     Vital Signs (Most Recent):  Temp: 97.9 °F (36.6 °C) (11/04/19 1103)  Pulse: 74 (11/04/19 1230)  Resp: (!) 23 (11/04/19 1230)  BP: (!) 109/53 (11/04/19 1230)  SpO2: 100 % (11/04/19 1230) Vital Signs (24h Range):  Temp:  [97.7 °F (36.5 °C)-99 °F (37.2 °C)] 97.9 °F (36.6 °C)  Pulse:  [] 74  Resp:  [20-32] 23  SpO2:  [96 %-100 %] 100 %  BP: ()/(45-67) 109/53     Weight: 70.6 kg (155 lb 10.3 oz)  Body mass index is 30.4 kg/m².    Intake/Output Summary (Last 24 hours) at 11/4/2019 1259  Last data filed at 11/4/2019 1200  Gross per 24 hour   Intake 1445 ml   Output 935 ml   Net 510 ml      Physical Exam   Constitutional: She is oriented to person, place, and time. She appears well-developed. No distress.   Pulmonary/Chest: Effort normal. No respiratory distress.   Abdominal: She exhibits no distension. There is no guarding.   Neurological: She is alert and oriented to person, place, and time.   Psychiatric: She has a normal mood and affect.   Nursing note and vitals reviewed.      Significant Labs: All pertinent labs within the past 24 hours have been reviewed.   Recent Labs   Lab 11/02/19  0430  11/03/19  0349 11/03/19  1538 11/04/19  0339   *  127*   < > 135*  135* 137 136   K 4.3  4.3   < > 3.8  3.8 3.8 3.8   CL 98  98   < > 103  103 105 104   CO2 20*  20*   < > 23  23 22* 22*   BUN 86*  86*   < > 66*  66* 76* 81*   CREATININE 2.7*  2.7*   < > 2.0*  2.0* 2.3* 2.7*   CALCIUM 7.3*  7.3*   < > 7.5*  7.5* 7.4* 7.0*   PROT 4.5*  --  4.4*  --  4.2*   BILITOT 0.9  --  0.6  --  0.5   ALKPHOS 79  --  117  --  133   ALT 22  --  23  --  29   AST 51*  --  50*  --  63*    < > = values in this interval not displayed.       Significant Imaging: I have reviewed all  pertinent imaging results/findings within the past 24 hours.

## 2019-11-04 NOTE — ASSESSMENT & PLAN NOTE
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      continue abx  PT/OT  Continue full liquid diet, boost, suplena   stop TPN  Follow up results of U/A  If urinary retention continues may require replacement of gonzales

## 2019-11-04 NOTE — PROGRESS NOTES
Progress Note  Nephrology      Consult Requested By: José Manuel Guidry MD  Reason for Consult: ARF    SUBJECTIVE:     Review of Systems   Constitutional: Negative for chills and fever.   Respiratory: Negative for cough and shortness of breath.    Cardiovascular: Negative for chest pain and leg swelling.   Gastrointestinal: Negative for nausea.     Patient Active Problem List   Diagnosis    Renovascular hypertension    Epilepsy    Acquired hypothyroidism    Osteoarthritis of lumbar spine    Degenerative joint disease of sacroiliac joint    S/P exploratory laparotomy    DLBCL (diffuse large B cell lymphoma)    Chronic hyponatremia    Anemia due to antineoplastic chemotherapy    Bilateral renal artery stenosis    Closed comminuted fracture of right humerus    Closed displaced fracture of distal phalanx of right little finger    Age-related osteoporosis with current pathological fracture with routine healing    Closed wedge compression fracture of eleventh thoracic vertebra with routine healing    DDD (degenerative disc disease), lumbar    Chronic bilateral low back pain without sciatica    Age-related osteoporosis without current pathological fracture    Vitamin D deficiency    Uncontrolled hypertension    Subjective memory complaints    Depression    Old MI (myocardial infarction)    Peripheral vascular disease, unspecified    History of stent insertion of left renal artery 7/19/17    Colon necrosis    Slow transit constipation    E coli bacteremia    Abdominal pain    Paroxysmal atrial fibrillation    Sinus pause    DIC (disseminated intravascular coagulation)    Anemia of acute infection    Thrombocytopenia    Enteritis       OBJECTIVE:     Medications:   ceFAZolin (ANCEF) IVPB  1 g Intravenous Q12H    [START ON 11/5/2019] epoetin moe-ebpx (RETACRIT) injection  20,000 Units Subcutaneous Every Tues, Thurs, Sat    heparin (porcine)  5,000 Units Subcutaneous Q8H    iron sucrose   100 mg Intravenous Every Mon, Wed, Fri    levothyroxine  125 mcg Oral Before breakfast    metronidazole  500 mg Intravenous Q8H    OXcarbazepine  300 mg Oral QHS    PHENobarbital  64.8 mg Oral Nightly    pravastatin  20 mg Oral Daily    sodium phosphate IVPB  30 mmol Intravenous Once       Vitals:    11/04/19 1230   BP: (!) 109/53   Pulse: 74   Resp: (!) 23   Temp:      I/O last 3 completed shifts:  In: 2588.8 [P.O.:1040; Other:60; NG/GT:160; IV Piggyback:300]  Out: 1670 [Urine:1420; Stool:250]  Physical Exam   Constitutional: She is oriented to person, place, and time. She appears well-developed and well-nourished. No distress.   HENT:   Head: Normocephalic and atraumatic.   Eyes: EOM are normal. No scleral icterus.   Neck: Neck supple. No JVD present.   Cardiovascular: Normal rate and regular rhythm.   No murmur heard.  Pulmonary/Chest: Effort normal and breath sounds normal. No respiratory distress. She has no wheezes. She has no rales.   Abdominal: Soft. Bowel sounds are normal. She exhibits no distension. There is no tenderness.   Musculoskeletal: She exhibits no edema.   Neurological: She is alert and oriented to person, place, and time.   Skin: Skin is warm and dry. No erythema. No pallor.   Psychiatric: She has a normal mood and affect. Judgment normal.     Laboratory:  Recent Labs   Lab 11/02/19 1952 11/03/19 0349 11/04/19 0339   WBC 10.26 9.69 10.66   HGB 8.3* 8.0* 7.0*   HCT 24.6* 23.9* 21.4*   PLT 47* 59* 78*   MONO 2.9*  0.3 0.0*  CANCELED  --      Recent Labs   Lab 11/03/19 0349 11/03/19  1538 11/04/19 0339   *  135* 137 136   K 3.8  3.8 3.8 3.8     103 105 104   CO2 23  23 22* 22*   BUN 66*  66* 76* 81*   CREATININE 2.0*  2.0* 2.3* 2.7*   CALCIUM 7.5*  7.5* 7.4* 7.0*   PHOS 2.5*  2.5* 2.7 3.3     Labs reviewed  Diagnostic Results:  X-Ray: Reviewed  US: Reviewed  Echo: Reviewed      ASSESSMENT/PLAN:   ARF - ATN from septic shock from E. Coli bacteremia + s/p sigmoid  colectomy for bowel necrosis    Overnight 700 cc urine but no UO since then  Cont CRRT - CVVHD. All meds dosing as for Cr Cl~ 30 while on CRRT    q8 h labs, mg/phos replacement as per protocol    Severe hypoalbuminemia - full liquid diet, no need to restrict to renal diet when on CRRT, suplena TID    Anemia of acute infection, h/o diffuse large B cell lymphoma SP R-CHOP- CRRT clotted today and lost blood in the system, recheck CBC if Hb<7 transfuse 1 unit  Appreciate hem/onc help    Thank you for allowing me to participate in the care of your patients  With any question please call [phone]  Fatemeh Crandall    [facility]  MARTA Duckworth MD, FACJEANETTE,   JESSICA Vale MD,   MD RAMONE Bourgeois, NP  [address]  [place], [address] [phone]

## 2019-11-04 NOTE — PROGRESS NOTES
[facility]  Cardiology  Progress Note    Patient Name: Annette Garcia  MRN: [id number]  Admission Date: 10/27/2019  Hospital Length of Stay: 7 days  Code Status: DNR   Attending Physician: José Manuel Guidry MD   Primary Care Physician: Jose Vlales MD  Expected Discharge Date:   Principal Problem:Colon necrosis    Subjective:     Hospital Course:   10/27/2019-10/29/2019 Per HPI. Underwent exp lap per General surgery with sigmoidectomy with colostomy with large amounts of turbid, murky fluid in abdomen. Admitted to ICU and remained vented post operatively with continued pressor use. Placed on TPN. Blood cultures with 2 out of 4 GNR resembling Ecoli on Invanz and Vancomycin   10/30/2019 Cardiology consulted STAT due to presence of pauses with longest pause of 11 seconds with ROSC. BP stable on IV Levophed. TCP at rate of 100 and transitioned to IV Dopamine. Emergent TVP in cath lab   10/31/2019 H&H down to 7.4&21.8 this AM. BUN 62 creatinine 2.5 K+ 4.1 Mg 3.7 down from 4.1. TVP in place with intermittent V paced rhythm. Remains intubated. BP marginal on IV Dopamine  11/1/2019 Remains intubated and on IV Dopamine with increased requirements this AM due to MAP less than 60. TVP remains in place with NSR HR 80 currently. Episode overnight of V paced rhythm-will decrease TVP to 70bpm today.  K+ 4.8 Mg 3.6 BUN 83 creatinine 3.1. Minimal response with no sedative medications on board   11/2/2019 Overnight received PRBC transfusion with appropriate response. Tolerating CRRT well. No events on tele. Intermittent afib. Currently on SBT.   11/3/2019 Overnight no acute events. BP stable. No pacing overnight. Extubated and responsive   11/4/2019 Remains extubated. HR 110s-120s overnight with no recurrent pauses or bradycardia overnight. No pacemaker use in over 48 hours. H&H 7.0&21.4 K+ 3.8 BUN 81 creatinine 2.7. On CRRT with net zero volume removal. +6.6 liters since admission          Review of  Systems   Constitution: Negative for chills, decreased appetite, diaphoresis and fever.   Cardiovascular: Negative for chest pain, claudication, cyanosis, dyspnea on exertion, irregular heartbeat, leg swelling, near-syncope, orthopnea, palpitations, paroxysmal nocturnal dyspnea and syncope.   Respiratory: Negative for cough, hemoptysis, shortness of breath and wheezing.    Gastrointestinal: Negative for bloating, abdominal pain, constipation, diarrhea, melena, nausea and vomiting.   Neurological: Negative for dizziness and weakness.     Objective:     Vital Signs (Most Recent):  Temp: 97.7 °F (36.5 °C) (11/04/19 0703)  Pulse: (!) 117 (11/04/19 0800)  Resp: (!) 23 (11/04/19 0800)  BP: (!) 91/54 (11/04/19 0800)  SpO2: 99 % (11/04/19 0800) Vital Signs (24h Range):  Temp:  [97.7 °F (36.5 °C)-99.2 °F (37.3 °C)] 97.7 °F (36.5 °C)  Pulse:  [] 117  Resp:  [20-32] 23  SpO2:  [96 %-100 %] 99 %  BP: ()/(45-70) 91/54     Weight: 70.6 kg (155 lb 10.3 oz)  Body mass index is 30.4 kg/m².     SpO2: 99 %  O2 Device (Oxygen Therapy): room air      Intake/Output Summary (Last 24 hours) at 11/4/2019 0936  Last data filed at 11/4/2019 0500  Gross per 24 hour   Intake 1730 ml   Output 1125 ml   Net 605 ml       Lines/Drains/Airways     Central Venous Catheter Line                 Port A Cath Single Lumen right subclavian -- days         Trialysis (Dialysis) Catheter 11/01/19 1708 right internal jugular 2 days          Drain                 Colostomy 10/29/19 1200 LLQ 5 days    Female External Urinary Catheter 11/03/19 1341 less than 1 day                Physical Exam   Constitutional: She is oriented to person, place, and time. She appears well-developed and well-nourished. No distress.   Cardiovascular: An irregularly irregular rhythm present. Tachycardia present. Exam reveals no gallop.   No murmur heard.  Pulmonary/Chest: Effort normal and breath sounds normal. No respiratory distress. She has no wheezes.   Abdominal:  Soft. Bowel sounds are normal. She exhibits no distension. There is no tenderness.   Neurological: She is alert and oriented to person, place, and time.   Skin: Skin is warm and dry.       Significant Labs:     Recent Labs   Lab 11/04/19  0339      K 3.8      CO2 22*   BUN 81*   CREATININE 2.7*   MG 2.1     Recent Labs   Lab 11/04/19  0339   WBC 10.66   RBC 2.16*   HGB 7.0*   HCT 21.4*   PLT 78*   MCV 99*   MCH 32.4*   MCHC 32.7       Significant Imaging: Echocardiogram:   Transthoracic echo (TTE) complete (Cupid Only):   Results for orders placed or performed during the hospital encounter of 10/27/19   Echo Color Flow Doppler? Yes   Result Value Ref Range    BSA 1.73 m2    Ascending aorta 2.61 cm    AV mean gradient 3 mmHg    Ao peak gennaro 1.23 m/s    Ao VTI 17.79 cm    IVS 1.07 0.6 - 1.1 cm    LA size 3.21 cm    Left Atrium Major Axis 5.12 cm    Left Atrium Minor Axis 4.83 cm    LVIDD 4.17 3.5 - 6.0 cm    LVIDS 2.93 2.1 - 4.0 cm    LVOT diameter 1.96 cm    LVOT peak VTI 14.74 cm    PW 0.98 0.6 - 1.1 cm    PV Peak D Gennaro 0.24 m/s    PV Peak S Gennaro 0.29 m/s    RA Major Axis 3.96 cm    RA Width 2.15 cm    RVDD 1.57 cm    TR Max Gennaro 2.06 m/s    LA WIDTH 2.65 cm    Ao root annulus 3.38 cm    AORTIC VALVE CUSP SEPERATION 1.48 cm    LV Diastolic Volume 77.15 mL    LV Systolic Volume 33.07 mL    LVOT peak gennaro 0.80 m/s    FS 30 %    LA volume 35.94 cm3    LV mass 140.48 g    Left Ventricle Relative Wall Thickness 0.47 cm    AV valve area 2.50 cm2    AV Velocity Ratio 0.65     AV index (prosthetic) 0.83     Pulm vein S/D ratio 1.21     LVOT area 3.0 cm2    LVOT stroke volume 44.45 cm3    AV peak gradient 6 mmHg    LV Systolic Volume Index 19.7 mL/m2    LV Diastolic Volume Index 45.94 mL/m2    LA Volume Index 21.4 mL/m2    LV Mass Index 84 g/m2    Triscuspid Valve Regurgitation Peak Gradient 17 mmHg    TDI SEPTAL 0.09 m/s    TDI LATERAL 0.16 m/s    Mean e' 0.13 m/s    Narrative    · Normal left ventricular systolic  function. The estimated ejection   fraction is 55%  · No wall motion abnormalities.  · Concentric left ventricular remodeling.  · Normal right ventricular systolic function.  · Mild mitral regurgitation.  · Diastolic pattern consistent with atrial fibrillation observed.        Assessment and Plan:     Brief HPI: Seen on AM NP rounds while resting in bed. Remains extubated and appears oriented. Voice soft and difficult to discern currently. Reviewed POC with ICU RN at bedside and discussed with patient as well. Informed of plan to remove TVP today and continue to monitor on telemetry    Sinus pause  -sinus pause with no escape rhythm; longest episode approximately 11 seconds on 10/30/2019  -questionable isolated pacemaker use 11/1 at 0200am  -no pacemaker use in over 72 hours; no recurrent pauses or bradycardia noted on telemetry  -K+ 3.8; goal K+ >4.0 and Mg >2.0  -no recent use of BB or CCB and will continue to avoid use   -no obvious reversible causes; no obvious non cardiac medications as cause of pause-reviewed by pharmacy  -will remove TVP today and will continue to monitor on telemetry   -weaned off Dopamine over the weekend       Paroxysmal atrial fibrillation  -no history of afib  -no admission EKG; EKG on 10/30 with afib with RVR with   -on BB at home for HTN management; on hold since admission (10/27) due to acute issues  -no BB or CCB due to sinus pauses  -currently in afib with HR 120s; no chronic AC given acute issues and low H&H; afib felt to be more stressed induced in setting of anemia and infectious etiology  -hopeful that HR will improve as acute issues improve  -continue to avoid CCB and BB due to pauses             VTE Risk Mitigation (From admission, onward)         Ordered     heparin (porcine) injection 5,000 Units  Every 8 hours      11/02/19 1447     heparin, porcine (PF) 100 unit/mL injection flush 1,000 Units  As needed (PRN)      11/02/19 2049     IP VTE LOW RISK PATIENT  Once       10/30/19 0811     Place sequential compression device  Until discontinued      10/28/19 0607                SERGEY Mena, ANP  Cardiology  [facility]

## 2019-11-04 NOTE — SUBJECTIVE & OBJECTIVE
Review of Systems   Constitution: Negative for chills, decreased appetite, diaphoresis and fever.   Cardiovascular: Negative for chest pain, claudication, cyanosis, dyspnea on exertion, irregular heartbeat, leg swelling, near-syncope, orthopnea, palpitations, paroxysmal nocturnal dyspnea and syncope.   Respiratory: Negative for cough, hemoptysis, shortness of breath and wheezing.    Gastrointestinal: Negative for bloating, abdominal pain, constipation, diarrhea, melena, nausea and vomiting.   Neurological: Negative for dizziness and weakness.     Objective:     Vital Signs (Most Recent):  Temp: 97.7 °F (36.5 °C) (11/04/19 0703)  Pulse: (!) 117 (11/04/19 0800)  Resp: (!) 23 (11/04/19 0800)  BP: (!) 91/54 (11/04/19 0800)  SpO2: 99 % (11/04/19 0800) Vital Signs (24h Range):  Temp:  [97.7 °F (36.5 °C)-99.2 °F (37.3 °C)] 97.7 °F (36.5 °C)  Pulse:  [] 117  Resp:  [20-32] 23  SpO2:  [96 %-100 %] 99 %  BP: ()/(45-70) 91/54     Weight: 70.6 kg (155 lb 10.3 oz)  Body mass index is 30.4 kg/m².     SpO2: 99 %  O2 Device (Oxygen Therapy): room air      Intake/Output Summary (Last 24 hours) at 11/4/2019 0936  Last data filed at 11/4/2019 0500  Gross per 24 hour   Intake 1730 ml   Output 1125 ml   Net 605 ml       Lines/Drains/Airways     Central Venous Catheter Line                 Port A Cath Single Lumen right subclavian -- days         Trialysis (Dialysis) Catheter 11/01/19 1708 right internal jugular 2 days          Drain                 Colostomy 10/29/19 1200 LLQ 5 days    Female External Urinary Catheter 11/03/19 1341 less than 1 day                Physical Exam   Constitutional: She is oriented to person, place, and time. She appears well-developed and well-nourished. No distress.   Cardiovascular: An irregularly irregular rhythm present. Tachycardia present. Exam reveals no gallop.   No murmur heard.  Pulmonary/Chest: Effort normal and breath sounds normal. No respiratory distress. She has no wheezes.    Abdominal: Soft. Bowel sounds are normal. She exhibits no distension. There is no tenderness.   Neurological: She is alert and oriented to person, place, and time.   Skin: Skin is warm and dry.       Significant Labs:     Recent Labs   Lab 11/04/19  0339      K 3.8      CO2 22*   BUN 81*   CREATININE 2.7*   MG 2.1     Recent Labs   Lab 11/04/19  0339   WBC 10.66   RBC 2.16*   HGB 7.0*   HCT 21.4*   PLT 78*   MCV 99*   MCH 32.4*   MCHC 32.7       Significant Imaging: Echocardiogram:   Transthoracic echo (TTE) complete (Cupid Only):   Results for orders placed or performed during the hospital encounter of 10/27/19   Echo Color Flow Doppler? Yes   Result Value Ref Range    BSA 1.73 m2    Ascending aorta 2.61 cm    AV mean gradient 3 mmHg    Ao peak gennaro 1.23 m/s    Ao VTI 17.79 cm    IVS 1.07 0.6 - 1.1 cm    LA size 3.21 cm    Left Atrium Major Axis 5.12 cm    Left Atrium Minor Axis 4.83 cm    LVIDD 4.17 3.5 - 6.0 cm    LVIDS 2.93 2.1 - 4.0 cm    LVOT diameter 1.96 cm    LVOT peak VTI 14.74 cm    PW 0.98 0.6 - 1.1 cm    PV Peak D Gennaro 0.24 m/s    PV Peak S Gennaro 0.29 m/s    RA Major Axis 3.96 cm    RA Width 2.15 cm    RVDD 1.57 cm    TR Max Gennaro 2.06 m/s    LA WIDTH 2.65 cm    Ao root annulus 3.38 cm    AORTIC VALVE CUSP SEPERATION 1.48 cm    LV Diastolic Volume 77.15 mL    LV Systolic Volume 33.07 mL    LVOT peak gennaro 0.80 m/s    FS 30 %    LA volume 35.94 cm3    LV mass 140.48 g    Left Ventricle Relative Wall Thickness 0.47 cm    AV valve area 2.50 cm2    AV Velocity Ratio 0.65     AV index (prosthetic) 0.83     Pulm vein S/D ratio 1.21     LVOT area 3.0 cm2    LVOT stroke volume 44.45 cm3    AV peak gradient 6 mmHg    LV Systolic Volume Index 19.7 mL/m2    LV Diastolic Volume Index 45.94 mL/m2    LA Volume Index 21.4 mL/m2    LV Mass Index 84 g/m2    Triscuspid Valve Regurgitation Peak Gradient 17 mmHg    TDI SEPTAL 0.09 m/s    TDI LATERAL 0.16 m/s    Mean e' 0.13 m/s    Narrative    · Normal left  ventricular systolic function. The estimated ejection   fraction is 55%  · No wall motion abnormalities.  · Concentric left ventricular remodeling.  · Normal right ventricular systolic function.  · Mild mitral regurgitation.  · Diastolic pattern consistent with atrial fibrillation observed.

## 2019-11-04 NOTE — PROGRESS NOTES
[facility]  Adult Nutrition  Progress Note    SUMMARY       Recommendations    Recommendation:   1. Encourage intake at meals as tolerated.   2. Add Boost Plus bid.  3. Advance diet to low fiber/residue when medically acceptable    Goals:   Pt will tolerate po diet with at least 50% intake at meals  Nutrition Goal Status: new  Communication of RD Recs: reviewed with RN(Carola)    Reason for Assessment  Reason For Assessment: RD follow-up  Diagnosis: (colon necrosis)  Relevant Medical History: HTN, petit mal epilepsy, scoliosis of lumbar spine, seozures, colon CA, cholecystectomy, appendectomy, sigmpidectomy  General Information Comments: Pt on Full Liquid diet. Consumed ~25% of breakfast today. s/p sigmpid colectomy with end colostomy 10/29. NFPE completed 10/31-mild wasting of temples & clavicles.  Nutrition Discharge Planning: d/c needs to be determined    Nutrition Risk Screen  Nutrition Risk Screen: tube feeding or parenteral nutrition    Nutrition/Diet History  Food Preferences: unable to assess  Spiritual, Cultural Beliefs, Evangelical Practices, Values that Affect Care: no  Factors Affecting Nutritional Intake: altered gastrointestinal function    Anthropometrics  Temp: 97.9 °F (36.6 °C)  Height: 5' (152.4 cm)  Height (inches): 60 in  Weight Method: Bed Scale  Weight: 70.6 kg (155 lb 10.3 oz)  Weight (lb): 155.65 lb  Ideal Body Weight (IBW), Female: 100 lb  % Ideal Body Weight, Female (lb): 154.76 lb  BMI (Calculated): 30.3  BMI Grade: 30 - 34.9- obesity - grade I     Lab/Procedures/Meds  Pertinent Labs Reviewed: reviewed  Pertinent Labs Comments: BUN 81H, Crea 2.7H, Ca 7.0L, Alb 1.8L  Pertinent Medications Reviewed: reviewed  Pertinent Medications Comments: albumin, Lasix, dopamine    Estimated/Assessed Needs  Weight Used For Calorie Calculations: 70.6 kg (155 lb 10.3 oz)  Energy Calorie Requirements (kcal): 1765 (25 kcal/kg)  Energy Need Method: Kcal/kg  Protein Requirements: 70g  (1.0g/kg)  Weight Used For Protein Calculations: 70.2 kg (154 lb 12.2 oz)  RDA Method (mL): 1765     Nutrition Prescription Ordered  Current Diet Order: Full Liquid    Evaluation of Received Nutrient/Fluid Intake  I/O: 1760/1125  Energy Calories Required: not meeting needs  Protein Required: not meeting needs  Fluid Required: not meeting needs  Comments: LBM 11/4  % Intake of Estimated Energy Needs: 0 - 25 %  % Meal Intake: 0 - 25 %    Nutrition Risk  Level of Risk/Frequency of Follow-up: (2xweekly)     Assessment and Plan  * Colon necrosis  Contributing Nutrition Diagnosis  Altered GI Function    Related to (etiology):   S/p surgery    Signs and Symptoms (as evidenced by):   NPO/intubated    Interventions:  Parenteral nutrition    Nutrition Diagnosis Status:   Improving (on [place] diet)         Monitor and Evaluation  Food and Nutrient Intake: parenteral nutrition intake  Food and Nutrient Adminstration: enteral and parenteral nutrition administration  Physical Activity and Function: nutrition-related ADLs and IADLs  Anthropometric Measurements: weight  Biochemical Data, Medical Tests and Procedures: electrolyte and renal panel  Nutrition-Focused Physical Findings: overall appearance     Malnutrition Assessment  [place] (Muscle Loss): mild depletion  Clav[place] Bone Region (Muscle Loss): mild depletion       Nutrition Follow-Up  RD Follow-up?: Yes

## 2019-11-04 NOTE — PROGRESS NOTES
[facility] Hospital Medicine  Progress Note    Patient Name: Annette Garcia  MRN: [id number]  Patient Class: IP- Inpatient   Admission Date: 10/27/2019  Length of Stay: 7 days  Attending Physician: José Manuel Guidry MD  Primary Care Provider: Jose Valles MD        Subjective:     Principal Problem:Colon necrosis        HPI:  Annette Garcia is an 84 year old white woman with peripheral vascular disease, renovascular hypertension, renal artery stenosis status post left renal artery stent placement on 7/19/17, coronary artery disease with history of myocardial infarction, diffuse large B cell lymphoma, anemia, epilepsy, hypothyroidism, chronic hyponatremia, depression, lumbar and sacroiliac joint osteoarthritis with chronic low back pain and history of lumbar spine surgery in 2013, slow transit constipation, history of appendectomy, history of cholecystectomy history hysterectomy, history of small bowel obstruction status post small bowel resection on 8/23/16. She lives in [place], [place]. Her son has epilepsy. Her daughter has tuberous sclerosis. Her primary care physician is Dr. Jose Torres. Her pain management specialist is Dr. Chirag Bates. Her neurologist is Dr. Gamal Lock.    She presented to [facility] on 10/27/19 with abdominal pain, difficulty urinating, and constipation for one day. She took polyethylene glycol and senna-docusate without relief. She strained during her last attempt at having a bowel movement. She had one episode of vomiting on the day of presentation. She felt weak when walking. In the emergency department, she was found to have acute kidney injury (BUN 27, creatinine 1.6, from 9 and 0.7 on 8/29/19), elevated transaminases ( and , from 16 and 11 on 8/29/19), and lactic acidosis (5.8). Contrast CT showed acute enteritis, sigmoid and rectal constipation, and some peritoneal free fluid in the pericolic  magdy.she was given lactated ringers, cefepime, and metronidazole. She required norepinephrine for septic shock. She was admitted to [facility].    Overview/Hospital Course:  Blood cultures grew pan-sensitive Escherichia coli. Due to history of bowel resection for obstruction, General Surgery was consulted for her bowel obstruction. She was put on amikacin and cefepime. 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 She was kept intubated postoperatively. Hospital Medicine changed amikacin to metronidazole, then later stopped metronidazole and changed cefepime to meropenem. She was given total parenteral nutrition. She became oliguric but responded well to a dose of furosemide, so Nephrology gave her doses of bumetanide. She developed thrombocytopenia. She developed atrial fibrillation and sinus pauses, so Cardiology was consulted and placed a temporary transvenous pacemaker. Echocardiogram only showed concentric remodeling and mild mitral regurgitation. Norepinephrine was changed to dopamine. EEG showed triphasic waves bilaterally consistent with metabolic encephalopathy. The epileptologist recommended treating her hyponatremia. She required pRBC transfusion. Nephrology improved her hyponatremia, and encephalopathy resolved. She was weaned off vasopressors and was extubated the morning of 11/2/19. Surgery stopped TPN and started full liquid diet on 11/3/19. Ertapenem was changed to cefazolin but Infectious Disease restarted metronidazole to continue empiric anaerobe coverage. Cardiology removed her temporary pacemaker on 11/4/19.     Interval History: Abdominal pain. Improving overall except for renal function. Still getting CRRT.    Review of Systems   Constitutional: Negative for chills and fever.   Gastrointestinal: Negative for nausea and vomiting.     Objective:     Vital Signs (Most Recent):  Temp: 97.9 °F (36.6 °C)  (11/04/19 1103)  Pulse: 74 (11/04/19 1230)  Resp: (!) 23 (11/04/19 1230)  BP: (!) 109/53 (11/04/19 1230)  SpO2: 100 % (11/04/19 1230) Vital Signs (24h Range):  Temp:  [97.7 °F (36.5 °C)-99 °F (37.2 °C)] 97.9 °F (36.6 °C)  Pulse:  [] 74  Resp:  [20-32] 23  SpO2:  [96 %-100 %] 100 %  BP: ()/(45-67) 109/53     Weight: 70.6 kg (155 lb 10.3 oz)  Body mass index is 30.4 kg/m².    Intake/Output Summary (Last 24 hours) at 11/4/2019 1259  Last data filed at 11/4/2019 1200  Gross per 24 hour   Intake 1445 ml   Output 935 ml   Net 510 ml      Physical Exam   Constitutional: She is oriented to person, place, and time. She appears well-developed. No distress.   Pulmonary/Chest: Effort normal. No respiratory distress.   Abdominal: She exhibits no distension. There is no guarding.   Neurological: She is alert and oriented to person, place, and time.   Psychiatric: She has a normal mood and affect.   Nursing note and vitals reviewed.      Significant Labs: All pertinent labs within the past 24 hours have been reviewed.   Recent Labs   Lab 11/02/19  0430  11/03/19  0349 11/03/19  1538 11/04/19  0339   *  127*   < > 135*  135* 137 136   K 4.3  4.3   < > 3.8  3.8 3.8 3.8   CL 98  98   < > 103  103 105 104   CO2 20*  20*   < > 23  23 22* 22*   BUN 86*  86*   < > 66*  66* 76* 81*   CREATININE 2.7*  2.7*   < > 2.0*  2.0* 2.3* 2.7*   CALCIUM 7.3*  7.3*   < > 7.5*  7.5* 7.4* 7.0*   PROT 4.5*  --  4.4*  --  4.2*   BILITOT 0.9  --  0.6  --  0.5   ALKPHOS 79  --  117  --  133   ALT 22  --  23  --  29   AST 51*  --  50*  --  63*    < > = values in this interval not displayed.       Significant Imaging: I have reviewed all pertinent imaging results/findings within the past 24 hours.      Assessment/Plan:      * Colon necrosis  E. coli bacteremia  Atrial fibrillation  Acute tubular necrosis  Sinus pauses  DIC (disseminated intravascular coagulation)  Giving cefazolin and metronidazole. Status post sigmoidectomy.  Getting CRRT. Platelet count improving. Temporary pacemaker removed. Appreciate General Surgery, Pulmonology, Cardiology, Nephrology, Hematology-Oncology, and Infectious Disease.    Anemia of acute infection  Transfused pRBCs.    Slow transit constipation  Takes polyethylene glycol at home.    Peripheral vascular disease, unspecified  Hyperlipidemia   Takes pravastatin.    Old MI (myocardial infarction)  Takes pravastatin.    Depression  Continue home mirtazapine 7.5mg HS.    Anemia due to antineoplastic chemotherapy  Stable.    Chronic hyponatremia  Nephrology helping to manage acute worsening. Currently in the normal range.    DLBCL (diffuse large B cell lymphoma)  Follow up outpatient.    Acquired hypothyroidism  Continue home levothyroxine 125 mcg before breakfast.    Epilepsy  Continue home oxycarbazepine 300 mg nightly, phenobarbital 64.8 mg nightly.    Renovascular hypertension  Hold home lisinopril, clonidine, carvedilol.    VTE Risk Mitigation (From admission, onward)         Ordered     heparin (porcine) injection 5,000 Units  Every 8 hours      11/02/19 1447     heparin, porcine (PF) 100 unit/mL injection flush 1,000 Units  As needed (PRN)      11/02/19 2049     IP VTE LOW RISK PATIENT  Once      10/30/19 0811     Place sequential compression device  Until discontinued      10/28/19 0607                Critical care time spent on the evaluation and treatment of severe organ dysfunction, review of pertinent labs and imaging studies, discussions with consulting providers and discussions with patient/family: 35 minutes.      José Manuel Guidry MD  Department of Hospital Medicine   [facility]

## 2019-11-04 NOTE — SUBJECTIVE & OBJECTIVE
Interval History: No acute events overnight. VSS, AF. Breathing comfortably on RA. Having ostomy function; stoma with dark color. Positive flatus from stoma as well. Some liquid stool from rectum. Tolerating meds via po route with sips of water. Off all pressors, off CRRT. Required in/out after urinary retention noted overnight-U/A sent. Elevation in creatinine noted on am labs (2.3 to 2.7).     Medications:  Continuous Infusions:    Scheduled Meds:   ceFAZolin (ANCEF) IVPB  1 g Intravenous Q12H    heparin (porcine)  5,000 Units Subcutaneous Q8H    iron sucrose  100 mg Intravenous Every Mon, Wed, Fri    levothyroxine  125 mcg Oral Before breakfast    metronidazole  500 mg Intravenous Q8H    OXcarbazepine  300 mg Oral QHS    PHENobarbital  64.8 mg Oral Nightly    pravastatin  20 mg Oral Daily     PRN Meds:sodium chloride, acetaminophen, albuterol-ipratropium, bumetanide, heparin, porcine (PF), morphine, ondansetron, promethazine (PHENERGAN) IVPB, ramelteon, sodium chloride 0.9%     Review of patient's allergies indicates:   Allergen Reactions    Adhesive Itching and Blisters    Penicillins Anaphylaxis    Tramadol Hives    Avelox [moxifloxacin] Rash     Facial and arm itching and redness. Pt states throat closes when given.    Amoxil [amoxicillin]     Aspridrox [aspirin, buffered]     Codeine Other (See Comments)     Throat swelling    Keflex [cephalexin]      Tolerated cefepime    Norvasc [amlodipine]     Red dye Hives    Robitussin [guaifenesin]     Sulfa (sulfonamide antibiotics)     Tylenol [acetaminophen]      Has reaction to Tylenol with red dye and unable to take Extra Strength Tylenol/ CAN ONLY TOLERATE REG STRENGTH TYLENOL    Vicks vaporub [camphor-eucalyptus oil-menthol]      Objective:     Vital Signs (Most Recent):  Temp: 98.6 °F (37 °C) (11/04/19 0300)  Pulse: 110 (11/04/19 0600)  Resp: (!) 23 (11/04/19 0600)  BP: 131/62 (11/04/19 0600)  SpO2: 100 % (11/04/19 0600) Vital Signs (24h  Range):  Temp:  [97.4 °F (36.3 °C)-99.2 °F (37.3 °C)] 98.6 °F (37 °C)  Pulse:  [] 110  Resp:  [20-32] 23  SpO2:  [96 %-100 %] 100 %  BP: ()/(45-70) 131/62     Weight: 70.6 kg (155 lb 10.3 oz)  Body mass index is 30.4 kg/m².    Intake/Output - Last 3 Shifts       11/02 0700 - 11/03 0659 11/03 0700 - 11/04 0659    P.O. 50 990    Other  60    NG/ 50    IV Piggyback 350 300    TPN 2297 360    Total Intake(mL/kg) 2827 (40) 1760 (24.9)    Urine (mL/kg/hr) 1200 (0.7) 975 (0.6)    Other 1369     Stool 350 150    Total Output 2919 1125    Net -92 +635          Stool Occurrence 3 x 3 x          Physical Exam   Constitutional: She appears well-developed. She appears lethargic. No distress.   Cardiovascular: Normal rate and regular rhythm.   Abdominal: Soft. There is no guarding.   Ostomy with stool/gas in bag. Ostomy mildly edematous, dusky in appearance   Musculoskeletal: She exhibits edema. She exhibits no tenderness.   Neurological: She appears lethargic.   Nursing note and vitals reviewed.      Significant Labs:  CBC:   Recent Labs   Lab 11/04/19  0339   WBC 10.66   RBC 2.16*   HGB 7.0*   HCT 21.4*   PLT 78*   MCV 99*   MCH 32.4*   MCHC 32.7     CMP:   Recent Labs   Lab 11/04/19  0339   GLU 98   CALCIUM 7.0*   ALBUMIN 1.8*  1.8*   PROT 4.2*      K 3.8   CO2 22*      BUN 81*   CREATININE 2.7*   ALKPHOS 133   ALT 29   AST 63*   BILITOT 0.5         Significant Diagnostics:  I have reviewed all pertinent imaging results/findings within the past 24 hours.

## 2019-11-04 NOTE — PROGRESS NOTES
CRRT ran for 4.5hrs before Arterial pressure read -500. Successfully rinsed back. Dr Crandall notified. Per MD if hemoglobin >7, no need to restart tonight. Will continue to monitor.

## 2019-11-04 NOTE — ASSESSMENT & PLAN NOTE
-no history of afib  -no admission EKG; EKG on 10/30 with afib with RVR with   -on BB at home for HTN management; on hold since admission (10/27) due to acute issues  -no BB or CCB due to sinus pauses  -currently in afib with HR 120s; no chronic AC given acute issues and low H&H; afib felt to be more stressed induced in setting of anemia and infectious etiology  -hopeful that HR will improve as acute issues improve  -continue to avoid CCB and BB due to pauses

## 2019-11-04 NOTE — ASSESSMENT & PLAN NOTE
-sinus pause with no escape rhythm; longest episode approximately 11 seconds on 10/30/2019  -questionable isolated pacemaker use 11/1 at 0200am  -no pacemaker use in over 72 hours; no recurrent pauses or bradycardia noted on telemetry  -K+ 3.8; goal K+ >4.0 and Mg >2.0  -no recent use of BB or CCB and will continue to avoid use   -no obvious reversible causes; no obvious non cardiac medications as cause of pause-reviewed by pharmacy  -will remove TVP today and will continue to monitor on telemetry   -weaned off Dopamine over the weekend

## 2019-11-04 NOTE — ASSESSMENT & PLAN NOTE
E. coli bacteremia  Atrial fibrillation  Acute tubular necrosis  Sinus pauses  DIC (disseminated intravascular coagulation)  Giving cefazolin and metronidazole. Status post sigmoidectomy. Getting CRRT. Platelet count improving. Temporary pacemaker removed. Appreciate General Surgery, Pulmonology, Cardiology, Nephrology, Hematology-Oncology, and Infectious Disease.

## 2019-11-04 NOTE — PROGRESS NOTES
Brief Hematology/Oncology Note:    Labs have been reviewed.    Lab Results   Component Value Date    WBC 10.66 11/04/2019    HGB 7.0 (L) 11/04/2019    HCT 21.4 (L) 11/04/2019    MCV 99 (H) 11/04/2019    PLT 78 (L) 11/04/2019       1. Thrombocytopenia / anemia of acute infection.  - Labs have been reviewed.  - hemoglobin today is 7 g/dL. Platelet count has improved to 78 k/uL.  - iron studies (10/31/19) revealed a decreased total iron binding capacity. This is consistent with anemia of acute infection/inflammation/hospitalization.  - antibiotics was switched from ertapenem to cefazolin.   - Continue to monitor counts.     2. DLBCL (diffuse large B cell lymphoma)  - patient of Dr. Arredondo. She completed 6 cycles of R-CHOP; last dose in January 2017.  - pathology from surgery (10/29/19) was negative for lymphoma  - no signs of recurrent disease     Charles Reeves M.D.  Hematology/Oncology  [facility] - [address], [address]  [place] [address]  Phone: [phone]  Fax: [phone]

## 2019-11-04 NOTE — PT/OT/SLP PROGRESS
Physical Therapy      Patient Name:  Annette Garcia   MRN:  [id number]    Patient not seen today secondary to refusing treatment. Will follow-up as able.    Micki Real, PT

## 2019-11-04 NOTE — PROGRESS NOTES
[facility]  Infectious Disease  Progress Note  Pt Name: Annette Garcia   Room: K261/K261 A  Admitted On: 10/27/2019  Today: 2019    Subjective:       Awake and alert today.  Patient has been off pressors, currently on CRRT.  She had some urinary retention overnight and had in/out cath done with UA sent.       Objective:   Last 24 Hour Vital Signs:  BP  Min: 90/45  Max: 152/67  Temp  Av.3 °F (36.8 °C)  Min: 97.7 °F (36.5 °C)  Max: 99 °F (37.2 °C)  Pulse  Av.1  Min: 74  Max: 127  Resp  Av.5  Min: 20  Max: 32  SpO2  Av.8 %  Min: 96 %  Max: 100 %  I/O last 3 completed shifts:  In: 2588.8 [P.O.:1040; Other:60; NG/GT:160; IV Piggyback:300]  Out: 1670 [Urine:1420; Stool:250]    Physical Examination:  Const: Frail, ill-appearing  HEENT: NCAT, PERRL, EOM nml  Cardio: RRR, no murmurs, 2+ peripheral pulses  Pulm: Lungs clear to auscultation bilaterally, normal work of breathing  Abdomen: Soft, non-distended, tender to palpation R side.  Midline incision well-healing without erythema or drainage. Ostomy in place with liquid stool in bag.    Skin: Warm and dry, no rash  Neuro: Alert and oriented x 3. No focal deficits.      Laboratory:    Hematology:  Recent Labs   Lab 19  0339   WBC 6.09 10.84 10.26 9.69 10.66   HGB 6.9* 8.3* 8.3* 8.0* 7.0*   PLT 45* 41* 47* 59* 78*   MCV 95 96 95 96 99*   RDW 14.2 14.3 14.5 14.7* 14.8*       Chemistry:  Recent Labs   Lab 10/31/19  0410  190  19  1411 19  0349 19  1538 19  0339   *   < > 122*  122*   < > 127*  127*  --  132* 134*  134* 135*  135* 137 136   K 4.1   < > 4.8  4.8   < > 4.3  4.3  --  3.8 3.8  3.8 3.8  3.8 3.8 3.8   CL 92*   < > 90*  90*   < > 98  98  --  99 102  102 103  103 105 104   CO2 24   < > 23  23   < > 20*  20*  --  23 23  23 23  23 22* 22*   BUN 62*   < > 83*  83*   < > 86*   86*  --  60* 54*  54* 66*  66* 76* 81*   CREATININE 2.5*   < > 3.1*  3.1*   < > 2.7*  2.7*  --  1.7* 1.6*  1.6* 2.0*  2.0* 2.3* 2.7*   *   < > 168*  168*   < > 214*  214*  --  223* 198*  198* 190*  190* 135* 98   CALCIUM 7.6*   < > 7.9*  7.9*   < > 7.3*  7.3*  --  7.8* 7.7*  7.7* 7.5*  7.5* 7.4* 7.0*   PROT 4.5*  --  4.7*  --  4.5*  --   --   --  4.4*  --  4.2*   ALBUMIN 2.0*   < > 2.1*  --  1.8*  --  1.9* 1.8*  1.8* 1.8*  1.8*  1.8*  --  1.8*  1.8*   ALT 39  --  29  --  22  --   --   --  23  --  29   AST 62*  --  59*  --  51*  --   --   --  50*  --  63*   ALKPHOS 45*  --  53*  --  79  --   --   --  117  --  133   MG  --    < > 3.6*  --  2.9*   < > 2.4 2.2 2.2 2.1 2.1   PHOS  --   --  3.9  --  3.2   < > 3.1  3.1 2.5*  2.5* 2.5*  2.5* 2.7 3.3    < > = values in this interval not displayed.     DM:  Recent Labs   Lab 11/03/19  0349 11/03/19  1538 11/04/19  0339   *  190* 135* 98     Microbiology:  Microbiology Results (last 7 days)     Procedure Component Value Units Date/Time    Blood culture [[id number]] Collected:  11/01/19 1335    Order Status:  Completed Specimen:  Blood Updated:  11/03/19 1822     Blood Culture, Routine No Growth to date      No Growth to date      No Growth to date    Blood culture [[id number]] Collected:  11/01/19 1516    Order Status:  Completed Specimen:  Blood Updated:  11/03/19 1822     Blood Culture, Routine No Growth to date      No Growth to date      No Growth to date    Narrative:       Collection has been rescheduled by AC2 at 11/01/2019 14:23 Reason: pt   recieving unit  Collection has been rescheduled by AC2 at 11/01/2019 14:23 Reason: pt   recieving unit    Blood culture [[id number]]     Order Status:  Canceled Specimen:  Blood     Blood culture [[id number]]     Order Status:  Canceled Specimen:  Blood     Blood Culture #1 **CANNOT BE ORDERED STAT** [[id number]]  (Abnormal)  (Susceptibility) Collected:  10/28/19 0049    Order Status:  Completed  Specimen:  Blood from Peripheral, Right  Wrist Updated:  10/30/19 1024     Blood Culture, Routine Gram stain aer bottle: Gram negative rods       Results called to and read back by: Kinga Bateman RN  10/28/2019  19:01      ESCHERICHIA COLI          Antibiotics and Day Number of Therapy:  Antibiotics (From admission, onward)    Start     Stop Route Frequency Ordered    11/03/19 1615  metronidazole IVPB 500 mg      -- IV Every 8 hours (non-standard times) 11/03/19 1506    11/03/19 1045  ceFAZolin (ANCEF) 1 gram in dextrose 5 % 50 mL IVPB (premix)      -- IV Every 12 hours (non-standard times) 11/03/19 0939            Assessment/Plan:     Ms. Garcia is an 85 yo female with E. Coli sepsis s/p sigmoid colectomy for bowel necrosis POD6.  She has multiple allergies to antibiotics, currently being treated with cefazolin and flagyl tolerating those well.     E. Coli septic shock  - Patient improving overall; now extubated and awake/alert; afebrile, normotensive; not requiring pressor support.  Hyponatremia is resolved  - On day 2 of cefazolin, day 2 of flagyl  - Repeat cultures on 11/1 with NG  - Continue cefazolin and flagyl    Rob Junior MD  [facility]  Infectious Disease

## 2019-11-04 NOTE — PLAN OF CARE
Recommendation:   1. Encourage intake at meals as tolerated.   2. Add Boost Plus bid.  3. Advance diet to low fiber/residue when medically acceptable    Goals:   Pt will tolerate po diet with at least 50% intake at meals  Nutrition Goal Status: new  Communication of RD Recs: reviewed with RN(Carola)

## 2019-11-05 PROBLEM — I45.5 SINUS PAUSE: Status: RESOLVED | Noted: 2019-10-30 | Resolved: 2019-11-05

## 2019-11-05 PROBLEM — I48.91 LONE ATRIAL FIBRILLATION: Status: RESOLVED | Noted: 2019-10-30 | Resolved: 2019-11-05

## 2019-11-05 PROBLEM — I48.91 LONE ATRIAL FIBRILLATION: Status: ACTIVE | Noted: 2019-10-30

## 2019-11-05 LAB
ALBUMIN SERPL BCP-MCNC: 1.9 G/DL (ref 3.5–5.2)
ALBUMIN SERPL BCP-MCNC: 2 G/DL (ref 3.5–5.2)
ALBUMIN SERPL BCP-MCNC: 2 G/DL (ref 3.5–5.2)
ALP SERPL-CCNC: 123 U/L (ref 55–135)
ALT SERPL W/O P-5'-P-CCNC: 17 U/L (ref 10–44)
ANION GAP SERPL CALC-SCNC: 12 MMOL/L (ref 8–16)
ANION GAP SERPL CALC-SCNC: 13 MMOL/L (ref 8–16)
ANION GAP SERPL CALC-SCNC: 14 MMOL/L (ref 8–16)
AST SERPL-CCNC: 53 U/L (ref 10–40)
BILIRUB SERPL-MCNC: 0.4 MG/DL (ref 0.1–1)
BUN SERPL-MCNC: 72 MG/DL (ref 8–23)
BUN SERPL-MCNC: 73 MG/DL (ref 8–23)
BUN SERPL-MCNC: 79 MG/DL (ref 8–23)
CALCIUM SERPL-MCNC: 6.4 MG/DL (ref 8.7–10.5)
CALCIUM SERPL-MCNC: 6.6 MG/DL (ref 8.7–10.5)
CALCIUM SERPL-MCNC: 6.7 MG/DL (ref 8.7–10.5)
CHLORIDE SERPL-SCNC: 105 MMOL/L (ref 95–110)
CHLORIDE SERPL-SCNC: 105 MMOL/L (ref 95–110)
CHLORIDE SERPL-SCNC: 109 MMOL/L (ref 95–110)
CO2 SERPL-SCNC: 19 MMOL/L (ref 23–29)
CO2 SERPL-SCNC: 20 MMOL/L (ref 23–29)
CO2 SERPL-SCNC: 21 MMOL/L (ref 23–29)
CREAT SERPL-MCNC: 2.5 MG/DL (ref 0.5–1.4)
CREAT SERPL-MCNC: 2.5 MG/DL (ref 0.5–1.4)
CREAT SERPL-MCNC: 2.6 MG/DL (ref 0.5–1.4)
EST. GFR  (AFRICAN AMERICAN): 19 ML/MIN/1.73 M^2
EST. GFR  (AFRICAN AMERICAN): 20 ML/MIN/1.73 M^2
EST. GFR  (AFRICAN AMERICAN): 20 ML/MIN/1.73 M^2
EST. GFR  (NON AFRICAN AMERICAN): 17 ML/MIN/1.73 M^2
GLUCOSE SERPL-MCNC: 104 MG/DL (ref 70–110)
GLUCOSE SERPL-MCNC: 117 MG/DL (ref 70–110)
GLUCOSE SERPL-MCNC: 98 MG/DL (ref 70–110)
PHOSPHATE SERPL-MCNC: 5.7 MG/DL (ref 2.7–4.5)
PHOSPHATE SERPL-MCNC: 5.8 MG/DL (ref 2.7–4.5)
POTASSIUM SERPL-SCNC: 3.7 MMOL/L (ref 3.5–5.1)
POTASSIUM SERPL-SCNC: 3.8 MMOL/L (ref 3.5–5.1)
POTASSIUM SERPL-SCNC: 5 MMOL/L (ref 3.5–5.1)
PROT SERPL-MCNC: 4.3 G/DL (ref 6–8.4)
SODIUM SERPL-SCNC: 138 MMOL/L (ref 136–145)
SODIUM SERPL-SCNC: 138 MMOL/L (ref 136–145)
SODIUM SERPL-SCNC: 142 MMOL/L (ref 136–145)

## 2019-11-05 PROCEDURE — 25000003 PHARM REV CODE 250: Performed by: EMERGENCY MEDICINE

## 2019-11-05 PROCEDURE — 97530 THERAPEUTIC ACTIVITIES: CPT

## 2019-11-05 PROCEDURE — 99900035 HC TECH TIME PER 15 MIN (STAT)

## 2019-11-05 PROCEDURE — 63600175 PHARM REV CODE 636 W HCPCS: Performed by: HOSPITALIST

## 2019-11-05 PROCEDURE — 97110 THERAPEUTIC EXERCISES: CPT

## 2019-11-05 PROCEDURE — 20000000 HC ICU ROOM

## 2019-11-05 PROCEDURE — 63600175 PHARM REV CODE 636 W HCPCS: Performed by: STUDENT IN AN ORGANIZED HEALTH CARE EDUCATION/TRAINING PROGRAM

## 2019-11-05 PROCEDURE — S0030 INJECTION, METRONIDAZOLE: HCPCS | Performed by: INTERNAL MEDICINE

## 2019-11-05 PROCEDURE — 80069 RENAL FUNCTION PANEL: CPT

## 2019-11-05 PROCEDURE — 94761 N-INVAS EAR/PLS OXIMETRY MLT: CPT

## 2019-11-05 PROCEDURE — 63600175 PHARM REV CODE 636 W HCPCS: Performed by: INTERNAL MEDICINE

## 2019-11-05 PROCEDURE — 25000003 PHARM REV CODE 250: Performed by: NURSE PRACTITIONER

## 2019-11-05 PROCEDURE — 25000003 PHARM REV CODE 250: Performed by: INTERNAL MEDICINE

## 2019-11-05 PROCEDURE — 80053 COMPREHEN METABOLIC PANEL: CPT

## 2019-11-05 RX ORDER — CALCIUM CARBONATE 200(500)MG
500 TABLET,CHEWABLE ORAL 3 TIMES DAILY
Status: DISCONTINUED | OUTPATIENT
Start: 2019-11-05 | End: 2019-11-05

## 2019-11-05 RX ADMIN — HEPARIN SODIUM 5000 UNITS: 5000 INJECTION, SOLUTION INTRAVENOUS; SUBCUTANEOUS at 02:11

## 2019-11-05 RX ADMIN — OXCARBAZEPINE 300 MG: 150 TABLET, FILM COATED ORAL at 09:11

## 2019-11-05 RX ADMIN — CEFAZOLIN SODIUM 1 G: 1 SOLUTION INTRAVENOUS at 10:11

## 2019-11-05 RX ADMIN — METRONIDAZOLE 500 MG: 500 INJECTION, SOLUTION INTRAVENOUS at 01:11

## 2019-11-05 RX ADMIN — LEVOTHYROXINE SODIUM 125 MCG: 25 TABLET ORAL at 05:11

## 2019-11-05 RX ADMIN — PRAVASTATIN SODIUM 20 MG: 20 TABLET ORAL at 10:11

## 2019-11-05 RX ADMIN — HEPARIN SODIUM 5000 UNITS: 5000 INJECTION, SOLUTION INTRAVENOUS; SUBCUTANEOUS at 05:11

## 2019-11-05 RX ADMIN — EPOETIN ALFA-EPBX 20000 UNITS: 10000 INJECTION, SOLUTION INTRAVENOUS; SUBCUTANEOUS at 10:11

## 2019-11-05 RX ADMIN — HEPARIN SODIUM 5000 UNITS: 5000 INJECTION, SOLUTION INTRAVENOUS; SUBCUTANEOUS at 09:11

## 2019-11-05 RX ADMIN — METRONIDAZOLE 500 MG: 500 INJECTION, SOLUTION INTRAVENOUS at 05:11

## 2019-11-05 RX ADMIN — METRONIDAZOLE 500 MG: 500 INJECTION, SOLUTION INTRAVENOUS at 08:11

## 2019-11-05 RX ADMIN — PHENOBARBITAL 64.8 MG: 32.4 TABLET ORAL at 09:11

## 2019-11-05 NOTE — ASSESSMENT & PLAN NOTE
Ms. Garcia is an 83 yo female with E. Coli sepsis 10/28 s/p sigmoid colectomy for bowel necrosis on 10/29/19. Repeat BC negative 11/1/19.  She has multiple allergies to antibiotics; vanco discontinued 11/2; ertapenem discontinued 11/3; cefazolin started 11/3 and flagyl started 11/3.     Rec:  Watch abdominal exam carefully  Recommend 2 weeks antibiotics past 11/1 (which was first negative BC date) - so stop date should be around 11/15.   Continue cefazolin and metronidazole IVPB for now      Post-Care Instructions: I reviewed with the patient in detail post-care instructions. Patient is not to engage in any heavy lifting, exercise, or swimming for the next 14 days. Should the patient develop any fevers, chills, bleeding, severe pain patient will contact the office immediately.

## 2019-11-05 NOTE — ASSESSMENT & PLAN NOTE
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      Plan:  continue abx  Continue CRRT; appreciate nephrology assistance  PT/OT  Continue full liquid diet, boost, suplena   stop TPN  If urinary retention continues may require replacement of gonzales

## 2019-11-05 NOTE — PROGRESS NOTES
[facility]  Infectious Disease  Progress Note    Patient Name: Annette Garcia  MRN: [id number]  Admission Date: 10/27/2019  Length of Stay: 8 days  Attending Physician: José Manuel Guidry MD  Primary Care Provider: Jose Valles MD    Isolation Status: No active isolations  Assessment/Plan:      E coli bacteremia  Ms. Garcia is an 83 yo female with E. Coli sepsis 10/28 s/p sigmoid colectomy for bowel necrosis on 10/29/19. Repeat BC negative 11/1/19.  She has multiple allergies to antibiotics; vanco discontinued 11/2; ertapenem discontinued 11/3; cefazolin started 11/3 and flagyl started 11/3.     Rec:  Watch abdominal exam carefully  Recommend 2 weeks antibiotics past 11/1 (which was first negative BC date) - so stop date should be around 11/15.   Continue cefazolin and metronidazole IVPB for now           Anticipated Disposition: unclear at this point    Thank you for your consult. I will follow-up with patient. Please contact us if you have any additional questions.[phone]    Ingrid Marte MD  Infectious Disease  [facility]    Subjective:     Principal Problem:Colon necrosis    HPI: Annette Garcia is an 84 year old white woman with peripheral vascular disease, renovascular hypertension, renal artery stenosis status post left renal artery stent placement on 7/19/17, coronary artery disease with history of myocardial infarction, diffuse large B cell lymphoma, anemia, epilepsy, hypothyroidism, chronic hyponatremia, depression, lumbar and sacroiliac joint osteoarthritis with chronic low back pain and history of lumbar spine surgery in 2013, slow transit constipation, history of appendectomy, history of cholecystectomy history hysterectomy, history of small bowel obstruction status post small bowel resection on 8/23/16.  She presented to the hospital with abdominal pain, difficulty urinating and constipation x 1 day.  In the ED, she was found to be in septic shock and  required pressors.  CT performed there showed constipation and peritoneal free fluid.  Blood cultures growing pan-sensitive E. Coli.  Patient was taken to the OR on 10/29 and underwent sigmoid colectomy with end colostomy for findings of sigmoid necrosis and large amounts of turbid fluid in abdomen.  Patient was kept intubated following surgery.  She was also found to by hyponatremic with altered mental status.  Patient remains intubated and on pressor support in ICU.  History is obtained by chart review.  She is currently being treated with ertapenem.   ID consult called on 11/1 for help with antibiotics.   11/2 extubated; stop vancomycin  11/3 stopped ertapenem and changed to cefazolin and flagyl  11/4 in and out cath done for urinary retention over night; off pressors; on crrt  11/5 ate food today; no pressors; crrt continues        Interval History: In no acute distress; ate breakfast today; family at bedside. Getting CRRT; afebrile  And no pressors. Tolerating the cefazolin.     Review of Systems   Respiratory: Negative for shortness of breath.    Gastrointestinal: Negative for diarrhea, nausea and vomiting.     Antibiotics (From admission, onward)    Start     Stop Route Frequency Ordered    11/03/19 1615  metronidazole IVPB 500 mg      -- IV Every 8 hours (non-standard times) 11/03/19 1506    11/03/19 1045  ceFAZolin (ANCEF) 1 gram in dextrose 5 % 50 mL IVPB (premix)      -- IV Every 12 hours (non-standard times) 11/03/19 0939        Objective:     Vital Signs (Most Recent):  Temp: 98.8 °F (37.1 °C) (11/05/19 0400)  Pulse: 83 (11/05/19 0907)  Resp: (!) 26 (11/05/19 0907)  BP: (!) 154/67 (11/05/19 0907)  SpO2: 100 % (11/05/19 0907) Vital Signs (24h Range):  Temp:  [98.1 °F (36.7 °C)-98.8 °F (37.1 °C)] 98.8 °F (37.1 °C)  Pulse:  [76-85] 83  Resp:  [20-34] 26  SpO2:  [97 %-100 %] 100 %  BP: ()/(46-79) 154/67     Weight: 70.6 kg (155 lb 10.3 oz)  Body mass index is 30.4 kg/m².    Estimated Creatinine  Clearance: 15.2 mL/min (A) (based on SCr of 2.5 mg/dL (H)).    Physical Exam   Cardiovascular: Normal heart sounds.   Pulmonary/Chest: Breath sounds normal. No respiratory distress.   Abdominal: Bowel sounds are normal. She exhibits no distension. There is tenderness.   Abdomen slightly tender but soft. Has colostomy in place; midline abdominal wound looks good.    Musculoskeletal: She exhibits no edema.   Neurological: She is alert.       Significant Labs:   Blood Culture:   Recent Labs   Lab 10/28/19  0049 11/01/19  1335 11/01/19  1516   LABBLOO Gram stain aer bottle: Gram negative rods   Results called to and read back by: Kinga Bateman RN  10/28/2019  19:01  ESCHERICHIA COLI* No Growth to date  No Growth to date  No Growth to date  No Growth to date No Growth to date  No Growth to date  No Growth to date  No Growth to date     CBC:   Recent Labs   Lab 11/04/19  0339 11/04/19  1525   WBC 10.66 12.26   HGB 7.0* 7.1*   HCT 21.4* 22.0*   PLT 78* 77*     CMP:   Recent Labs   Lab 11/04/19  0339 11/05/19  0357 11/05/19  0801    138 138   K 3.8 3.7 3.8    105 105   CO2 22* 20* 21*   GLU 98 104 98   BUN 81* 72* 73*   CREATININE 2.7* 2.5* 2.5*   CALCIUM 7.0* 6.6* 6.7*   PROT 4.2* 4.3*  --    ALBUMIN 1.8*  1.8* 2.0* 2.0*   BILITOT 0.5 0.4  --    ALKPHOS 133 123  --    AST 63* 53*  --    ALT 29 17  --    ANIONGAP 10 13 12   EGFRNONAA 16* 17* 17*     Urine Studies:   Recent Labs   Lab 10/28/19  1724 11/04/19  0412   COLORU Orange* Yellow   APPEARANCEUA Clear Clear   PHUR 5.0 6.0   SPECGRAV 1.025 1.010   PROTEINUA 2+* Negative   GLUCUA Negative Negative   KETONESU 1+* Negative   BILIRUBINUA 2+* Negative   OCCULTUA Trace* 1+*   NITRITE Positive* Negative   UROBILINOGEN 2.0-3.0* Negative   LEUKOCYTESUR Negative Negative   RBCUA 10* 2   WBCUA 5  --    BACTERIA Many*  --    SQUAMEPITHEL 2  --    HYALINECASTS 0  --        Significant Imaging:   cxr 11/1 -   Right internal jugular line has its tip in the  superior vena cava.  Enteric tube approximately 3 cm above jesús level.  Additional tube/line appear unchanged.  No change in the cardiopulmonary status.

## 2019-11-05 NOTE — PLAN OF CARE
Pt remains free from falls and injuries. Trialysis, accessed port, colostomy remain. Straight cathed X1 for 600 mL. Pt repositioned frequently. Plan of care discussed with pt. VSS, no acute issues overnight.

## 2019-11-05 NOTE — PLAN OF CARE
Problem: Occupational Therapy Goal  Goal: Occupational Therapy Goal  Description  Goals to be met by: 12/1     Patient will increase functional independence with ADLs by performing:  MET 11/1/19 Pt will maintain gaze x 30 secs  MET 11/1/19 Pt will follow 1 step commands 25% of the time  Pt's family will be indep w/PROM    New Goals set 11/4/2019  Pt will complete AROM shoulder flex/ext through 75% of available AROM.  Pt will complete simple grooming task min A seated with HOB elevated.           Outcome: Ongoing, Progressing  Pt progressing well towards goals and met 2 goals this date. Goals updated as appropriate. Cont OT POC

## 2019-11-05 NOTE — PT/OT/SLP PROGRESS
Occupational Therapy   Treatment    Name: Annette Garcia  MRN: [id number]  Admitting Diagnosis:  Colon necrosis  5 Days Post-Op    Recommendations:     Discharge Recommendations: (TBD pending progress)  Discharge Equipment Recommendations:  other (see comments)(TBD)  Barriers to discharge:  (Pt requires increased level of assistance)    Assessment:     Annette Garcia is a 84 y.o. female with a medical diagnosis of Colon necrosis.  She presents with deconditioning. RN ok'd therex with HOB elevated but no OOB/EOB activity initiated this date. Performance deficits affecting function are weakness, impaired self care skills, decreased coordination, decreased ROM, impaired skin, impaired coordination, decreased upper extremity function, impaired functional mobilty, impaired endurance, gait instability, decreased lower extremity function, impaired cardiopulmonary response to activity, edema.     Rehab Prognosis:  Fair; patient would benefit from acute skilled OT services to address these deficits and reach maximum level of function.       Plan:     Patient to be seen 3 x/week to address the above listed problems via self-care/home management, therapeutic activities, therapeutic exercises  · Plan of Care Expires: 12/01/19  · Plan of Care Reviewed with: patient    Subjective     Pain/Comfort:  · Pain Rating 1: (did not rate numerically)  · Location - Side 1: Right  · Location - Orientation 1: lateral  · Location 1: neck(at IV site)  · Pain Addressed 1: Reposition, Distraction, Cessation of Activity  · Pain Rating Post-Intervention 1: (did not rate)    Objective:     Communicated with: nsg prior to session.  Patient found HOB elevated with (ICU monitoring, CRRT running) upon OT entry to room.    General Precautions: Standard, fall, aspiration   Orthopedic Precautions:N/A   Braces: N/A     Occupational Performance:       Functional Mobility/Transfers:  · Functional Mobility: NT this date 2/2 RN request, pt currently on  CRRT      [facility] 6 Click ADL: 9    Treatment & Education:  Pt educated on role of OT and POC.   Pt performing skills as listed above.  Pt performed 2x10 reps AAROM BUE shoulder flexion/ext and elbow flex ext; RUE with minimally limited shoulder flex 2/2 reported pain ~0-145*. Pt completed 2x10 reps AROM to punch towards blue glove placed ~20 inches from pt with min v/c required to complete movements with slowed, controlled movement through full AROM available as pt initially punching with small, quick jabs. Pt responding to all questions appropriately and following all motor commands this date. Pt demonstrated good carryover of learning. Pt with edema LUE forearm and hand and completed x3 composite digtial flex/ext for each shoulder flexion completed on LUE (total ~60 reps) with slight improvement in LUE edema at end of OT session. Pt provided with pillow to prop LUE for continued edema management. VSS throughout session.    Patient left HOB elevated with all lines intact, call button in reach, bed alarm on, nsg notified and nsg presentEducation:      GOALS:   Multidisciplinary Problems     Occupational Therapy Goals        Problem: Occupational Therapy Goal    Goal Priority Disciplines Outcome Interventions   Occupational Therapy Goal     OT, PT/OT Ongoing, Progressing    Description:  Goals to be met by: 12/1     Patient will increase functional independence with ADLs by performing:  MET 11/1/19 Pt will maintain gaze x 30 secs  MET 11/1/19 Pt will follow 1 step commands 25% of the time  Pt's family will be indep w/PROM    New Goals set 11/4/2019  Pt will complete AROM shoulder flex/ext through 75% of available AROM.  Pt will complete simple grooming task min A seated with HOB elevated.                            Time Tracking:     OT Date of Treatment: 11/04/19  OT Start Time: 1447  OT Stop Time: 1509  OT Total Time (min): 22 min    Billable Minutes:Therapeutic Exercise 22    Rita Burden OT  11/4/2019

## 2019-11-05 NOTE — PLAN OF CARE
10/30 s/p sigmoid colectomy for bowel necrosis     Tn visited with pt and pt's beleneceGenna who was at bedside and updated on plan of care. Tn encouraged pt to work with PT/OT and will continue to follow and assist with d/c needs.     11/05/19 1052   Discharge Reassessment   Assessment Type Discharge Planning Reassessment   Provided patient/caregiver education on the expected discharge date and the discharge plan Yes   Do you have any problems affording any of your prescribed medications? No   Discharge Plan A Skilled Nursing Facility   Discharge Plan B Home Health   DME Needed Upon Discharge    (tbd)   Patient choice form signed by patient/caregiver N/A   Anticipated Discharge Disposition Home-Health   Can the patient answer the patient profile reliably? Yes, cognitively intact   How does the patient rate their overall health at the present time? Fair   Describe the patient's ability to walk at the present time. Does not walk or unable to take any steps at all   How often would a person be available to care for the patient? Whenever needed   Number of comorbid conditions (as recorded on the chart) Five or more   During the past month, has the patient often been bothered by feeling down, depressed or hopeless? No   During the past month, has the patient often been bothered by little interest or pleasure in doing things? No

## 2019-11-05 NOTE — SUBJECTIVE & OBJECTIVE
Interval History: No acute events overnight. VSS, AF. Breathing comfortably on RA. Ostomy with consistent appearance but good function, no abdominal pain.  Tolerating meds via po route with sips of water. Noting she feels consistently hungry, tolerating po diet without n/v. Off all pressors, tolerated CRRT yesterday without difficulty. Required in/out after urinary retention noted overnight once again. Cr downtrending on am labs (2.7 to 2.5). Overall just notes she feels fatigued; is working with PT/OT.    Medications:  Continuous Infusions:    Scheduled Meds:   ceFAZolin (ANCEF) IVPB  1 g Intravenous Q12H    epoetin moe-ebpx (RETACRIT) injection  20,000 Units Subcutaneous Every Tues, Thurs, Sat    heparin (porcine)  5,000 Units Subcutaneous Q8H    iron sucrose  100 mg Intravenous Every Mon, Wed, Fri    levothyroxine  125 mcg Oral Before breakfast    metronidazole  500 mg Intravenous Q8H    OXcarbazepine  300 mg Oral QHS    PHENobarbital  64.8 mg Oral Nightly    pravastatin  20 mg Oral Daily     PRN Meds:sodium chloride, acetaminophen, albuterol-ipratropium, bumetanide, heparin, porcine (PF), morphine, ondansetron, promethazine (PHENERGAN) IVPB, ramelteon, sodium chloride 0.9%     Review of patient's allergies indicates:   Allergen Reactions    Adhesive Itching and Blisters    Penicillins Anaphylaxis    Tramadol Hives    Avelox [moxifloxacin] Rash     Facial and arm itching and redness. Pt states throat closes when given.    Amoxil [amoxicillin]     Aspridrox [aspirin, buffered]     Codeine Other (See Comments)     Throat swelling    Keflex [cephalexin]      Tolerated cefepime    Norvasc [amlodipine]     Red dye Hives    Robitussin [guaifenesin]     Sulfa (sulfonamide antibiotics)     Tylenol [acetaminophen]      Has reaction to Tylenol with red dye and unable to take Extra Strength Tylenol/ CAN ONLY TOLERATE REG STRENGTH TYLENOL    Vicks vaporub [camphor-eucalyptus oil-menthol]       Objective:     Vital Signs (Most Recent):  Temp: 98.8 °F (37.1 °C) (11/05/19 0400)  Pulse: 81 (11/05/19 0400)  Resp: (!) 25 (11/05/19 0400)  BP: (!) 125/58 (11/05/19 0400)  SpO2: 98 % (11/05/19 0400) Vital Signs (24h Range):  Temp:  [97.6 °F (36.4 °C)-98.8 °F (37.1 °C)] 98.8 °F (37.1 °C)  Pulse:  [] 81  Resp:  [20-34] 25  SpO2:  [87 %-100 %] 98 %  BP: ()/(46-86) 125/58     Weight: 70.6 kg (155 lb 10.3 oz)  Body mass index is 30.4 kg/m².    Intake/Output - Last 3 Shifts       11/03 0700 - 11/04 0659 11/04 0700 - 11/05 0659    P.O. 990 650    Other 60 120    NG/GT 50     IV Piggyback 300 650         Total Intake(mL/kg) 1760 (24.9) 1420 (20.1)    Urine (mL/kg/hr) 975 (0.6) 1400 (0.8)    Other  345    Stool 150 50    Total Output 1125 1795    Net +635 -375          Stool Occurrence 3 x 1 x          Physical Exam   Constitutional: She appears well-developed. She appears lethargic. No distress.   Cardiovascular: Normal rate and regular rhythm.   Abdominal: Soft. There is no guarding.   Ostomy with stool/gas in bag. Ostomy mildly edematous, dusky in appearance   Musculoskeletal: She exhibits edema. She exhibits no tenderness.   Neurological: She appears lethargic.   Nursing note and vitals reviewed.      Significant Labs:  CBC:   Recent Labs   Lab 11/04/19  1525   WBC 12.26   RBC 2.18*   HGB 7.1*   HCT 22.0*   PLT 77*   *   MCH 32.6*   MCHC 32.3     CMP:   Recent Labs   Lab 11/05/19  0357      CALCIUM 6.6*   ALBUMIN 2.0*   PROT 4.3*      K 3.7   CO2 20*      BUN 72*   CREATININE 2.5*   ALKPHOS 123   ALT 17   AST 53*   BILITOT 0.4         Significant Diagnostics:  I have reviewed all pertinent imaging results/findings within the past 24 hours.

## 2019-11-05 NOTE — PT/OT/SLP PROGRESS
Occupational Therapy   Treatment    Name: Annette Garcia  MRN: [id number]  Admitting Diagnosis:  Colon necrosis  6 Days Post-Op    Recommendations:     Discharge Recommendations: nursing facility, skilled  Discharge Equipment Recommendations:  (TBD)  Barriers to discharge:  (Pt requires increased level of assistance)    Assessment:     Annette Garcia is a 84 y.o. female with a medical diagnosis of Colon necrosis.  She presents with significant deconditioning and required mod encouragement to participate in therapy this date 2/2 fatigue. Performance deficits affecting function are weakness, gait instability, decreased upper extremity function, decreased ROM, impaired cardiopulmonary response to activity, impaired endurance, impaired balance, decreased lower extremity function, impaired coordination, decreased safety awareness, impaired self care skills, impaired functional mobilty, edema, impaired fine motor.     Rehab Prognosis:  Fair+; patient would benefit from acute skilled OT services to address these deficits and reach maximum level of function.       Plan:     Patient to be seen 5 x/week to address the above listed problems via self-care/home management, therapeutic activities, therapeutic exercises  · Plan of Care Expires: 12/01/19  · Plan of Care Reviewed with: patient    Subjective     Pain/Comfort:  Pain Rating 1: (did not rate numerically)  Location - Side 1: Bilateral  Location - Orientation 1: lower  Location 1: leg  Pain Addressed 1: Reposition, Distraction, Cessation of Activity  Pain Rating Post-Intervention 1: (did not rate)    Objective:     Communicated with: nsamina prior to session.  Patient found HOB elevated with colostomy, peripheral IV, telemetry, SCD, pulse ox (continuous), gonzales catheter, oxygen, blood pressure cuff(pressure relief booties) upon OT entry to room.    General Precautions: Standard, fall, aspiration   Orthopedic Precautions:N/A   Braces: N/A     Occupational Performance:      Bed Mobility:    · Patient completed Rolling/Turning to Left with  maximal assistance and 2 persons  · Patient completed Scooting/Bridging with total assistance  · Patient completed Supine to Sit with maximal assistance and 2 persons  · Patient completed Sit to Supine with maximal assistance and 2 persons     Functional Mobility/Transfers:  Functional Mobility: Pt with poor dynamic seated balance; pt required mod-max A to maintain static seated balance EOB ~3-4 minutes and required mod A to place BUE onto bed but did not demonstrate ability to use BUE to prop self or to WB through BUE    Activities of Daily Living:  · Feeding:  moderate assistance and maximal assistance to bring cup to mouth to drink from straw   · Lower Body Dressing: total assistance to don/doff B socks      AMPA 6 Click ADL: 9    Treatment & Education:  Pt educated on role of OT and POC.   Pt performing skills as listed above.   Pt completed 1x8 reps AAROM B shoulder flexion and 3x8 reps composite grasp/release with L hand and end range of L shoulder flexion for edema management and AROM for composite grasp. Pt provided with pillow to prop LUE  2/2 continued edema in hand and forearm. Pt educated to continue hand pumps throughout day for continued edema management    Patient left HOB elevated with all lines intact, call button in reach, nsg notified and nsg presentEducation:      GOALS:   Multidisciplinary Problems     Occupational Therapy Goals        Problem: Occupational Therapy Goal    Goal Priority Disciplines Outcome Interventions   Occupational Therapy Goal     OT, PT/OT Ongoing, Progressing    Description:  Goals to be met by: 12/1     Patient will increase functional independence with ADLs by performing:  MET 11/1/19 Pt will maintain gaze x 30 secs  MET 11/1/19 Pt will follow 1 step commands 25% of the time  Pt's family will be indep w/PROM    New Goals set 11/4/2019  Pt will complete AROM shoulder flex/ext through 75% of available  AROM.  Pt will complete simple grooming task min A seated with HOB elevated.                            Time Tracking:     OT Date of Treatment: 11/05/19  OT Start Time: 1422  OT Stop Time: 1504  OT Total Time (min): 42 min    Billable Minutes:Therapeutic Activity 10 Co Tx PT    Rita Burden OT  11/5/2019

## 2019-11-05 NOTE — PROGRESS NOTES
Progress Note  Nephrology      Consult Requested By: José Manuel Guidry MD  Reason for Consult: ARF    SUBJECTIVE:     Review of Systems   Constitutional: Negative for chills and fever.   Respiratory: Negative for cough and shortness of breath.    Cardiovascular: Negative for chest pain and leg swelling.   Gastrointestinal: Negative for nausea.     Patient Active Problem List   Diagnosis    Renovascular hypertension    Epilepsy    Acquired hypothyroidism    Osteoarthritis of lumbar spine    Degenerative joint disease of sacroiliac joint    S/P exploratory laparotomy    DLBCL (diffuse large B cell lymphoma)    Chronic hyponatremia    Anemia due to antineoplastic chemotherapy    Bilateral renal artery stenosis    Closed comminuted fracture of right humerus    Closed displaced fracture of distal phalanx of right little finger    Age-related osteoporosis with current pathological fracture with routine healing    Closed wedge compression fracture of eleventh thoracic vertebra with routine healing    DDD (degenerative disc disease), lumbar    Chronic bilateral low back pain without sciatica    Age-related osteoporosis without current pathological fracture    Vitamin D deficiency    Uncontrolled hypertension    Subjective memory complaints    Depression    Old MI (myocardial infarction)    Peripheral vascular disease, unspecified    History of stent insertion of left renal artery 7/19/17    Colon necrosis    Slow transit constipation    E coli bacteremia    Abdominal pain    DIC (disseminated intravascular coagulation)    Anemia of acute infection    Thrombocytopenia    Enteritis    Acute urinary retention       OBJECTIVE:     Medications:   ceFAZolin (ANCEF) IVPB  1 g Intravenous Q12H    epoetin moe-ebpx (RETACRIT) injection  20,000 Units Subcutaneous Every Tues, Thurs, Sat    heparin (porcine)  5,000 Units Subcutaneous Q8H    iron sucrose  100 mg Intravenous Every Mon, Wed, Fri     levothyroxine  125 mcg Oral Before breakfast    metronidazole  500 mg Intravenous Q8H    OXcarbazepine  300 mg Oral QHS    PHENobarbital  64.8 mg Oral Nightly    pravastatin  20 mg Oral Daily       Vitals:    11/05/19 0907   BP: (!) 154/67   Pulse: 83   Resp: (!) 26   Temp:      I/O last 3 completed shifts:  In: 1880 [P.O.:900; Other:180; IV Piggyback:800]  Out: 2645 [Urine:2100; Other:345; Stool:200]  Physical Exam   Constitutional: She is oriented to person, place, and time. She appears well-developed and well-nourished. No distress.   HENT:   Head: Normocephalic and atraumatic.   Eyes: EOM are normal. No scleral icterus.   Neck: Neck supple. No JVD present.   Cardiovascular: Normal rate and regular rhythm.   No murmur heard.  Pulmonary/Chest: Effort normal and breath sounds normal. No respiratory distress. She has no wheezes. She has no rales.   Abdominal: Soft. Bowel sounds are normal. She exhibits no distension. There is no tenderness.   Musculoskeletal: She exhibits no edema.   Neurological: She is alert and oriented to person, place, and time.   Skin: Skin is warm and dry. No erythema. No pallor.   Psychiatric: She has a normal mood and affect. Judgment normal.     Laboratory:  Recent Labs   Lab 11/03/19  0349 11/04/19  0339 11/04/19  1525   WBC 9.69 10.66 12.26   HGB 8.0* 7.0* 7.1*   HCT 23.9* 21.4* 22.0*   PLT 59* 78* 77*   MONO 0.0*  CANCELED  --  6.6  0.8     Recent Labs   Lab 11/03/19  1538 11/04/19  0339 11/05/19  0357 11/05/19  0801    136 138 138   K 3.8 3.8 3.7 3.8    104 105 105   CO2 22* 22* 20* 21*   BUN 76* 81* 72* 73*   CREATININE 2.3* 2.7* 2.5* 2.5*   CALCIUM 7.4* 7.0* 6.6* 6.7*   PHOS 2.7 3.3  --  5.7*     Labs reviewed  Diagnostic Results:  X-Ray: Reviewed  US: Reviewed  Echo: Reviewed      ASSESSMENT/PLAN:   ARF - ATN from septic shock from E. Coli bacteremia + s/p sigmoid colectomy for bowel necrosis    Pt with urinary retention, in and out cath 500-800 cc urine every 6-8  hours, please place gonzales for now      CRRT keep c,otting yesterday, ran for 4.5hours, hold for now, daily labs   Avoid nephrotoxins, dose all meds ad for GFr < 10  No indication for urgent RRT. Monitor closely. Strict ins/outs, daily weight      Severe hypoalbuminemia -advanced diet, NEpro with meals + boost  Anemia of acute infection, h/o diffuse large B cell lymphoma SP R-CHOP- CRRT stable, transfuse only if Hb<7  Appreciate hem/onc help    Thank you for allowing me to participate in the care of your patients  With any question please call [phone]  Ftaemeh Crandall    [facility]  MARTA Duckworth MD, JESSICA HIDALGO MD,   MD RAMONE Bourgeois, NP  [address]  [place], [address] [phone]

## 2019-11-05 NOTE — SUBJECTIVE & OBJECTIVE
Interval History: In no acute distress; ate breakfast today; family at bedside. Getting CRRT; afebrile  And no pressors. Tolerating the cefazolin.     Review of Systems   Respiratory: Negative for shortness of breath.    Gastrointestinal: Negative for diarrhea, nausea and vomiting.     Antibiotics (From admission, onward)    Start     Stop Route Frequency Ordered    11/03/19 1615  metronidazole IVPB 500 mg      -- IV Every 8 hours (non-standard times) 11/03/19 1506    11/03/19 1045  ceFAZolin (ANCEF) 1 gram in dextrose 5 % 50 mL IVPB (premix)      -- IV Every 12 hours (non-standard times) 11/03/19 0939        Objective:     Vital Signs (Most Recent):  Temp: 98.8 °F (37.1 °C) (11/05/19 0400)  Pulse: 83 (11/05/19 0907)  Resp: (!) 26 (11/05/19 0907)  BP: (!) 154/67 (11/05/19 0907)  SpO2: 100 % (11/05/19 0907) Vital Signs (24h Range):  Temp:  [98.1 °F (36.7 °C)-98.8 °F (37.1 °C)] 98.8 °F (37.1 °C)  Pulse:  [76-85] 83  Resp:  [20-34] 26  SpO2:  [97 %-100 %] 100 %  BP: ()/(46-79) 154/67     Weight: 70.6 kg (155 lb 10.3 oz)  Body mass index is 30.4 kg/m².    Estimated Creatinine Clearance: 15.2 mL/min (A) (based on SCr of 2.5 mg/dL (H)).    Physical Exam   Cardiovascular: Normal heart sounds.   Pulmonary/Chest: Breath sounds normal. No respiratory distress.   Abdominal: Bowel sounds are normal. She exhibits no distension. There is tenderness.   Abdomen slightly tender but soft. Has colostomy in place; midline abdominal wound looks good.    Musculoskeletal: She exhibits no edema.   Neurological: She is alert.       Significant Labs:   Blood Culture:   Recent Labs   Lab 10/28/19  0049 11/01/19  1335 11/01/19  1516   LABBLOO Gram stain aer bottle: Gram negative rods   Results called to and read back by: Kinga Bateman RN  10/28/2019  19:01  ESCHERICHIA COLI* No Growth to date  No Growth to date  No Growth to date  No Growth to date No Growth to date  No Growth to date  No Growth to date  No Growth to date      CBC:   Recent Labs   Lab 11/04/19  0339 11/04/19  1525   WBC 10.66 12.26   HGB 7.0* 7.1*   HCT 21.4* 22.0*   PLT 78* 77*     CMP:   Recent Labs   Lab 11/04/19  0339 11/05/19  0357 11/05/19  0801    138 138   K 3.8 3.7 3.8    105 105   CO2 22* 20* 21*   GLU 98 104 98   BUN 81* 72* 73*   CREATININE 2.7* 2.5* 2.5*   CALCIUM 7.0* 6.6* 6.7*   PROT 4.2* 4.3*  --    ALBUMIN 1.8*  1.8* 2.0* 2.0*   BILITOT 0.5 0.4  --    ALKPHOS 133 123  --    AST 63* 53*  --    ALT 29 17  --    ANIONGAP 10 13 12   EGFRNONAA 16* 17* 17*     Urine Studies:   Recent Labs   Lab 10/28/19  1724 11/04/19  0412   COLORU Orange* Yellow   APPEARANCEUA Clear Clear   PHUR 5.0 6.0   SPECGRAV 1.025 1.010   PROTEINUA 2+* Negative   GLUCUA Negative Negative   KETONESU 1+* Negative   BILIRUBINUA 2+* Negative   OCCULTUA Trace* 1+*   NITRITE Positive* Negative   UROBILINOGEN 2.0-3.0* Negative   LEUKOCYTESUR Negative Negative   RBCUA 10* 2   WBCUA 5  --    BACTERIA Many*  --    SQUAMEPITHEL 2  --    HYALINECASTS 0  --        Significant Imaging:   cxr 11/1 -   Right internal jugular line has its tip in the superior vena cava.  Enteric tube approximately 3 cm above jesús level.  Additional tube/line appear unchanged.  No change in the cardiopulmonary status.

## 2019-11-05 NOTE — ASSESSMENT & PLAN NOTE
E. coli bacteremia  Acute tubular necrosis  DIC (disseminated intravascular coagulation)  Giving cefazolin and metronidazole. Status post sigmoidectomy. Getting CRRT but renal function improving. Everything seems to be improving. Appreciate General Surgery, Pulmonology, Cardiology, Nephrology, Hematology-Oncology, and Infectious Disease. Physical and Occupational Therapy working with patient.

## 2019-11-05 NOTE — PLAN OF CARE
Problem: Physical Therapy Goal  Goal: Physical Therapy Goal  Description  1.  Roll bilaterally with supervision  2.  Supine to/from sit with supervision  3.  Bed to/from chair with CG  4.  Ambulate 50' with RW with CG  5.  Sit in chair 2 hours   Outcome: Ongoing, Progressing   Patient sat at EOB x 3-4 minutes with poor dynamic balance, requiring mod-maxA to maintain sitting; following commands and assisted with BLE/UE AAROMes; cont with POC; recommend SNF.

## 2019-11-05 NOTE — SUBJECTIVE & OBJECTIVE
Interval History: No new problems. Patient resting. Woke her to tell her she is overall improving, that she will likely be taken off dialysis soon, and will eventually go to a skilled nursing facility before going home.    Review of Systems   Constitutional: Negative for chills and fever.   Gastrointestinal: Negative for nausea and vomiting.     Objective:     Vital Signs (Most Recent):  Temp: 98.8 °F (37.1 °C) (11/05/19 0400)  Pulse: 83 (11/05/19 0907)  Resp: (!) 26 (11/05/19 0907)  BP: (!) 154/67 (11/05/19 0907)  SpO2: 100 % (11/05/19 0907) Vital Signs (24h Range):  Temp:  [97.6 °F (36.4 °C)-98.8 °F (37.1 °C)] 98.8 °F (37.1 °C)  Pulse:  [72-85] 83  Resp:  [20-34] 26  SpO2:  [87 %-100 %] 100 %  BP: ()/(46-86) 154/67     Weight: 70.6 kg (155 lb 10.3 oz)  Body mass index is 30.4 kg/m².    Intake/Output Summary (Last 24 hours) at 11/5/2019 1155  Last data filed at 11/5/2019 0400  Gross per 24 hour   Intake 1420 ml   Output 1795 ml   Net -375 ml      Physical Exam   Constitutional: She is oriented to person, place, and time. She appears well-developed. No distress.   Pulmonary/Chest: Effort normal. No respiratory distress.   Abdominal: She exhibits no distension. There is no guarding.   Neurological: She is alert and oriented to person, place, and time.   Psychiatric: She has a normal mood and affect.   Nursing note and vitals reviewed.      Significant Labs: All pertinent labs within the past 24 hours have been reviewed.   Recent Labs   Lab 11/03/19  0349  11/04/19  0339 11/05/19  0357 11/05/19  0801   *  135*   < > 136 138 138   K 3.8  3.8   < > 3.8 3.7 3.8     103   < > 104 105 105   CO2 23  23   < > 22* 20* 21*   BUN 66*  66*   < > 81* 72* 73*   CREATININE 2.0*  2.0*   < > 2.7* 2.5* 2.5*   CALCIUM 7.5*  7.5*   < > 7.0* 6.6* 6.7*   PROT 4.4*  --  4.2* 4.3*  --    BILITOT 0.6  --  0.5 0.4  --    ALKPHOS 117  --  133 123  --    ALT 23  --  29 17  --    AST 50*  --  63* 53*  --     < > = values  in this interval not displayed.       Significant Imaging: I have reviewed all pertinent imaging results/findings within the past 24 hours.

## 2019-11-05 NOTE — PROGRESS NOTES
[facility] Hospital Medicine  Progress Note    Patient Name: Annette Garcia  MRN: [id number]  Patient Class: IP- Inpatient   Admission Date: 10/27/2019  Length of Stay: 8 days  Attending Physician: José Manuel Guidry MD  Primary Care Provider: Jose Valles MD        Subjective:     Principal Problem:Colon necrosis        HPI:  Annette Garcia is an 84 year old white woman with peripheral vascular disease, renovascular hypertension, renal artery stenosis status post left renal artery stent placement on 7/19/17, coronary artery disease with history of myocardial infarction, diffuse large B cell lymphoma, anemia, epilepsy, hypothyroidism, chronic hyponatremia, depression, lumbar and sacroiliac joint osteoarthritis with chronic low back pain and history of lumbar spine surgery in 2013, slow transit constipation, history of appendectomy, history of cholecystectomy history hysterectomy, history of small bowel obstruction status post small bowel resection on 8/23/16. She lives in [place], [place]. Her son has epilepsy. Her daughter has tuberous sclerosis. Her primary care physician is Dr. Jose Torres. Her pain management specialist is Dr. Chirag Bates. Her neurologist is Dr. Gamal Lock.    She presented to [facility] on 10/27/19 with abdominal pain, difficulty urinating, and constipation for one day. She took polyethylene glycol and senna-docusate without relief. She strained during her last attempt at having a bowel movement. She had one episode of vomiting on the day of presentation. She felt weak when walking. In the emergency department, she was found to have acute kidney injury (BUN 27, creatinine 1.6, from 9 and 0.7 on 8/29/19), elevated transaminases ( and , from 16 and 11 on 8/29/19), and lactic acidosis (5.8). Contrast CT showed acute enteritis, sigmoid and rectal constipation, and some peritoneal free fluid in the pericolic  magdy.she was given lactated ringers, cefepime, and metronidazole. She required norepinephrine for septic shock. She was admitted to [facility].    Overview/Hospital Course:  Blood cultures grew pan-sensitive Escherichia coli. Due to history of bowel resection for obstruction, General Surgery was consulted for her bowel obstruction. She was put on amikacin and cefepime. 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 She was kept intubated postoperatively. Hospital Medicine changed amikacin to metronidazole, then later stopped metronidazole and changed cefepime to meropenem. She was given total parenteral nutrition. She became oliguric but responded well to a dose of furosemide, so Nephrology gave her doses of bumetanide. She developed thrombocytopenia. She developed atrial fibrillation and sinus pauses, so Cardiology was consulted and placed a temporary transvenous pacemaker. Echocardiogram only showed concentric remodeling and mild mitral regurgitation. Norepinephrine was changed to dopamine. EEG showed triphasic waves bilaterally consistent with metabolic encephalopathy. The epileptologist recommended treating her hyponatremia. She required pRBC transfusion. Nephrology improved her hyponatremia, and encephalopathy resolved. She was weaned off vasopressors and was extubated the morning of 11/2/19. Surgery stopped TPN and started full liquid diet on 11/3/19. Ertapenem was changed to cefazolin but Infectious Disease restarted metronidazole to continue empiric anaerobe coverage. Cardiology removed her temporary pacemaker on 11/4/19.     Interval History: No new problems. Patient resting. Woke her to tell her she is overall improving, that she will likely be taken off dialysis soon, and will eventually go to a skilled nursing facility before going home.    Review of Systems   Constitutional: Negative for chills and fever.    Gastrointestinal: Negative for nausea and vomiting.     Objective:     Vital Signs (Most Recent):  Temp: 98.8 °F (37.1 °C) (11/05/19 0400)  Pulse: 83 (11/05/19 0907)  Resp: (!) 26 (11/05/19 0907)  BP: (!) 154/67 (11/05/19 0907)  SpO2: 100 % (11/05/19 0907) Vital Signs (24h Range):  Temp:  [97.6 °F (36.4 °C)-98.8 °F (37.1 °C)] 98.8 °F (37.1 °C)  Pulse:  [72-85] 83  Resp:  [20-34] 26  SpO2:  [87 %-100 %] 100 %  BP: ()/(46-86) 154/67     Weight: 70.6 kg (155 lb 10.3 oz)  Body mass index is 30.4 kg/m².    Intake/Output Summary (Last 24 hours) at 11/5/2019 1155  Last data filed at 11/5/2019 0400  Gross per 24 hour   Intake 1420 ml   Output 1795 ml   Net -375 ml      Physical Exam   Constitutional: She is oriented to person, place, and time. She appears well-developed. No distress.   Pulmonary/Chest: Effort normal. No respiratory distress.   Abdominal: She exhibits no distension. There is no guarding.   Neurological: She is alert and oriented to person, place, and time.   Psychiatric: She has a normal mood and affect.   Nursing note and vitals reviewed.      Significant Labs: All pertinent labs within the past 24 hours have been reviewed.   Recent Labs   Lab 11/03/19  0349  11/04/19  0339 11/05/19  0357 11/05/19  0801   *  135*   < > 136 138 138   K 3.8  3.8   < > 3.8 3.7 3.8     103   < > 104 105 105   CO2 23  23   < > 22* 20* 21*   BUN 66*  66*   < > 81* 72* 73*   CREATININE 2.0*  2.0*   < > 2.7* 2.5* 2.5*   CALCIUM 7.5*  7.5*   < > 7.0* 6.6* 6.7*   PROT 4.4*  --  4.2* 4.3*  --    BILITOT 0.6  --  0.5 0.4  --    ALKPHOS 117  --  133 123  --    ALT 23  --  29 17  --    AST 50*  --  63* 53*  --     < > = values in this interval not displayed.       Significant Imaging: I have reviewed all pertinent imaging results/findings within the past 24 hours.      Assessment/Plan:      * Colon necrosis  E. coli bacteremia  Acute tubular necrosis  DIC (disseminated intravascular coagulation)  Giving  cefazolin and metronidazole. Status post sigmoidectomy. Getting CRRT but renal function improving. Everything seems to be improving. Appreciate General Surgery, Pulmonology, Cardiology, Nephrology, Hematology-Oncology, and Infectious Disease. Physical and Occupational Therapy working with patient.    Anemia of acute infection  Transfused pRBCs.    Slow transit constipation  Takes polyethylene glycol at home.    Peripheral vascular disease, unspecified  Hyperlipidemia   Takes pravastatin.    Old MI (myocardial infarction)  Takes pravastatin.    Depression  Continue home mirtazapine 7.5mg HS.    Anemia due to antineoplastic chemotherapy  Stable.    Chronic hyponatremia  Nephrology helping to manage acute worsening. Currently in the normal range.    DLBCL (diffuse large B cell lymphoma)  Follow up outpatient.    Acquired hypothyroidism  Continue home levothyroxine 125 mcg before breakfast.    Epilepsy  Continue home oxycarbazepine 300 mg nightly, phenobarbital 64.8 mg nightly.    Renovascular hypertension  Holding home lisinopril, clonidine, carvedilol.      VTE Risk Mitigation (From admission, onward)         Ordered     heparin (porcine) 1,000 unit/mL injection      11/04/19 1534     heparin (porcine) injection 5,000 Units  Every 8 hours      11/02/19 1447     heparin, porcine (PF) 100 unit/mL injection flush 1,000 Units  As needed (PRN)      11/02/19 2049     IP VTE LOW RISK PATIENT  Once      10/30/19 0811     Place sequential compression device  Until discontinued      10/28/19 0607                Disposition: Can transfer out of the ICU once Nephrology says CRRT is no longer needed.      José Manuel Guidry MD  Department of Hospital Medicine   [facility]

## 2019-11-05 NOTE — PROGRESS NOTES
[facility]  General Surgery  Progress Note    Subjective:     History of Present Illness:  No notes on file    Post-Op Info:  Procedure(s) (LRB):  Insertion, Pacemaker, Temporary Transvenous (Right)   6 Days Post-Op     Interval History: No acute events overnight. VSS, AF. Breathing comfortably on RA. Ostomy with consistent appearance but good function, no abdominal pain.  Tolerating meds via po route with sips of water. Noting she feels consistently hungry, tolerating po diet without n/v. Off all pressors, tolerated CRRT yesterday without difficulty. Required in/out after urinary retention noted overnight once again. Cr downtrending on am labs (2.7 to 2.5). Overall just notes she feels fatigued; is working with PT/OT.    Medications:  Continuous Infusions:    Scheduled Meds:   ceFAZolin (ANCEF) IVPB  1 g Intravenous Q12H    epoetin moe-ebpx (RETACRIT) injection  20,000 Units Subcutaneous Every Tues, Thurs, Sat    heparin (porcine)  5,000 Units Subcutaneous Q8H    iron sucrose  100 mg Intravenous Every Mon, Wed, Fri    levothyroxine  125 mcg Oral Before breakfast    metronidazole  500 mg Intravenous Q8H    OXcarbazepine  300 mg Oral QHS    PHENobarbital  64.8 mg Oral Nightly    pravastatin  20 mg Oral Daily     PRN Meds:sodium chloride, acetaminophen, albuterol-ipratropium, bumetanide, heparin, porcine (PF), morphine, ondansetron, promethazine (PHENERGAN) IVPB, ramelteon, sodium chloride 0.9%     Review of patient's allergies indicates:   Allergen Reactions    Adhesive Itching and Blisters    Penicillins Anaphylaxis    Tramadol Hives    Avelox [moxifloxacin] Rash     Facial and arm itching and redness. Pt states throat closes when given.    Amoxil [amoxicillin]     Aspridrox [aspirin, buffered]     Codeine Other (See Comments)     Throat swelling    Keflex [cephalexin]      Tolerated cefepime    Norvasc [amlodipine]     Red dye Hives    Robitussin [guaifenesin]     Sulfa  (sulfonamide antibiotics)     Tylenol [acetaminophen]      Has reaction to Tylenol with red dye and unable to take Extra Strength Tylenol/ CAN ONLY TOLERATE REG STRENGTH TYLENOL    Vicks vaporub [camphor-eucalyptus oil-menthol]      Objective:     Vital Signs (Most Recent):  Temp: 98.8 °F (37.1 °C) (11/05/19 0400)  Pulse: 81 (11/05/19 0400)  Resp: (!) 25 (11/05/19 0400)  BP: (!) 125/58 (11/05/19 0400)  SpO2: 98 % (11/05/19 0400) Vital Signs (24h Range):  Temp:  [97.6 °F (36.4 °C)-98.8 °F (37.1 °C)] 98.8 °F (37.1 °C)  Pulse:  [] 81  Resp:  [20-34] 25  SpO2:  [87 %-100 %] 98 %  BP: ()/(46-86) 125/58     Weight: 70.6 kg (155 lb 10.3 oz)  Body mass index is 30.4 kg/m².    Intake/Output - Last 3 Shifts       11/03 0700 - 11/04 0659 11/04 0700 - 11/05 0659    P.O. 990 650    Other 60 120    NG/GT 50     IV Piggyback 300 650         Total Intake(mL/kg) 1760 (24.9) 1420 (20.1)    Urine (mL/kg/hr) 975 (0.6) 1400 (0.8)    Other  345    Stool 150 50    Total Output 1125 1795    Net +635 -375          Stool Occurrence 3 x 1 x          Physical Exam   Constitutional: She appears well-developed. She appears lethargic. No distress.   Cardiovascular: Normal rate and regular rhythm.   Abdominal: Soft. There is no guarding.   Ostomy with stool/gas in bag. Ostomy mildly edematous, dusky in appearance   Musculoskeletal: She exhibits edema. She exhibits no tenderness.   Neurological: She appears lethargic.   Nursing note and vitals reviewed.      Significant Labs:  CBC:   Recent Labs   Lab 11/04/19  1525   WBC 12.26   RBC 2.18*   HGB 7.1*   HCT 22.0*   PLT 77*   *   MCH 32.6*   MCHC 32.3     CMP:   Recent Labs   Lab 11/05/19  0357      CALCIUM 6.6*   ALBUMIN 2.0*   PROT 4.3*      K 3.7   CO2 20*      BUN 72*   CREATININE 2.5*   ALKPHOS 123   ALT 17   AST 53*   BILITOT 0.4         Significant Diagnostics:  I have reviewed all pertinent imaging results/findings within the past 24  hours.    Assessment/Plan:     Abdominal pain  84-year-old female s/p sigmoid colectomy with end colostomy 10/30/19.      Plan:  continue abx  Continue CRRT; appreciate nephrology assistance  PT/OT  Advance to regular diet as tolerated (when she has her dentures), boost, suplena   stop TPN  If urinary retention continues may require replacement of gonzales          Selam Hanson MD  General Surgery  [facility]

## 2019-11-06 PROBLEM — N17.9 AKI (ACUTE KIDNEY INJURY): Status: ACTIVE | Noted: 2019-11-06

## 2019-11-06 LAB
ALBUMIN SERPL BCP-MCNC: 2.1 G/DL (ref 3.5–5.2)
ALP SERPL-CCNC: 112 U/L (ref 55–135)
ALT SERPL W/O P-5'-P-CCNC: 8 U/L (ref 10–44)
ANION GAP SERPL CALC-SCNC: 12 MMOL/L (ref 8–16)
AST SERPL-CCNC: 42 U/L (ref 10–40)
BACTERIA BLD CULT: NORMAL
BACTERIA BLD CULT: NORMAL
BILIRUB SERPL-MCNC: 0.4 MG/DL (ref 0.1–1)
BUN SERPL-MCNC: 71 MG/DL (ref 8–23)
CALCIUM SERPL-MCNC: 6.5 MG/DL (ref 8.7–10.5)
CHLORIDE SERPL-SCNC: 106 MMOL/L (ref 95–110)
CO2 SERPL-SCNC: 22 MMOL/L (ref 23–29)
CREAT SERPL-MCNC: 2.4 MG/DL (ref 0.5–1.4)
EST. GFR  (AFRICAN AMERICAN): 21 ML/MIN/1.73 M^2
EST. GFR  (NON AFRICAN AMERICAN): 18 ML/MIN/1.73 M^2
GLUCOSE SERPL-MCNC: 112 MG/DL (ref 70–110)
HBV SURFACE AB SER QL IA: NEGATIVE
HBV SURFACE AB SERPL IA-ACNC: <3 MIU/ML
POTASSIUM SERPL-SCNC: 3.5 MMOL/L (ref 3.5–5.1)
PROT SERPL-MCNC: 4.6 G/DL (ref 6–8.4)
SODIUM SERPL-SCNC: 140 MMOL/L (ref 136–145)

## 2019-11-06 PROCEDURE — 25000003 PHARM REV CODE 250: Performed by: NURSE PRACTITIONER

## 2019-11-06 PROCEDURE — 63600175 PHARM REV CODE 636 W HCPCS: Performed by: STUDENT IN AN ORGANIZED HEALTH CARE EDUCATION/TRAINING PROGRAM

## 2019-11-06 PROCEDURE — 97530 THERAPEUTIC ACTIVITIES: CPT

## 2019-11-06 PROCEDURE — 63600175 PHARM REV CODE 636 W HCPCS: Performed by: HOSPITALIST

## 2019-11-06 PROCEDURE — S0030 INJECTION, METRONIDAZOLE: HCPCS | Performed by: INTERNAL MEDICINE

## 2019-11-06 PROCEDURE — 25000003 PHARM REV CODE 250: Performed by: HOSPITALIST

## 2019-11-06 PROCEDURE — 97110 THERAPEUTIC EXERCISES: CPT

## 2019-11-06 PROCEDURE — 63600175 PHARM REV CODE 636 W HCPCS: Performed by: INTERNAL MEDICINE

## 2019-11-06 PROCEDURE — 11000001 HC ACUTE MED/SURG PRIVATE ROOM

## 2019-11-06 PROCEDURE — 25000003 PHARM REV CODE 250: Performed by: INTERNAL MEDICINE

## 2019-11-06 PROCEDURE — 80053 COMPREHEN METABOLIC PANEL: CPT

## 2019-11-06 RX ORDER — MORPHINE SULFATE 2 MG/ML
1 INJECTION, SOLUTION INTRAMUSCULAR; INTRAVENOUS EVERY 4 HOURS PRN
Status: DISCONTINUED | OUTPATIENT
Start: 2019-11-06 | End: 2019-11-12 | Stop reason: HOSPADM

## 2019-11-06 RX ORDER — ACETAMINOPHEN 325 MG/1
650 TABLET ORAL EVERY 4 HOURS PRN
Status: DISCONTINUED | OUTPATIENT
Start: 2019-11-06 | End: 2019-11-12 | Stop reason: HOSPADM

## 2019-11-06 RX ORDER — METRONIDAZOLE 500 MG/1
500 TABLET ORAL EVERY 8 HOURS
Status: DISCONTINUED | OUTPATIENT
Start: 2019-11-06 | End: 2019-11-12 | Stop reason: HOSPADM

## 2019-11-06 RX ADMIN — METRONIDAZOLE 500 MG: 500 TABLET ORAL at 06:11

## 2019-11-06 RX ADMIN — CEFAZOLIN SODIUM 1 G: 1 SOLUTION INTRAVENOUS at 12:11

## 2019-11-06 RX ADMIN — CEFAZOLIN SODIUM 1 G: 1 SOLUTION INTRAVENOUS at 10:11

## 2019-11-06 RX ADMIN — PHENOBARBITAL 64.8 MG: 32.4 TABLET ORAL at 10:11

## 2019-11-06 RX ADMIN — LEVOTHYROXINE SODIUM 125 MCG: 25 TABLET ORAL at 05:11

## 2019-11-06 RX ADMIN — HEPARIN SODIUM 5000 UNITS: 5000 INJECTION, SOLUTION INTRAVENOUS; SUBCUTANEOUS at 02:11

## 2019-11-06 RX ADMIN — PRAVASTATIN SODIUM 20 MG: 20 TABLET ORAL at 09:11

## 2019-11-06 RX ADMIN — METRONIDAZOLE 500 MG: 500 INJECTION, SOLUTION INTRAVENOUS at 02:11

## 2019-11-06 RX ADMIN — IRON SUCROSE 100 MG: 20 INJECTION, SOLUTION INTRAVENOUS at 09:11

## 2019-11-06 RX ADMIN — METRONIDAZOLE 500 MG: 500 TABLET ORAL at 10:11

## 2019-11-06 RX ADMIN — OXCARBAZEPINE 300 MG: 150 TABLET, FILM COATED ORAL at 10:11

## 2019-11-06 RX ADMIN — HEPARIN SODIUM 5000 UNITS: 5000 INJECTION, SOLUTION INTRAVENOUS; SUBCUTANEOUS at 10:11

## 2019-11-06 RX ADMIN — ONDANSETRON 8 MG: 8 TABLET, ORALLY DISINTEGRATING ORAL at 02:11

## 2019-11-06 RX ADMIN — METRONIDAZOLE 500 MG: 500 INJECTION, SOLUTION INTRAVENOUS at 09:11

## 2019-11-06 RX ADMIN — HEPARIN SODIUM 5000 UNITS: 5000 INJECTION, SOLUTION INTRAVENOUS; SUBCUTANEOUS at 05:11

## 2019-11-06 NOTE — PT/OT/SLP PROGRESS
Occupational Therapy   Treatment    Name: Annette Garcia  MRN: [id number]  Admitting Diagnosis:  Colon necrosis  7 Days Post-Op    Recommendations:     Discharge Recommendations: nursing facility, skilled  Discharge Equipment Recommendations:  (TBD)  Barriers to discharge:       Assessment:     Annette Garcia is a 84 y.o. female with a medical diagnosis of Colon necrosis.  She presents with slight increased sitting endurance today. Performance deficits affecting function are weakness, impaired self care skills, impaired balance, decreased coordination, decreased safety awareness, decreased ROM, impaired skin, impaired endurance, impaired functional mobilty, decreased upper extremity function, decreased lower extremity function, gait instability, impaired coordination, impaired cardiopulmonary response to activity.     Rehab Prognosis:  Good; patient would benefit from acute skilled OT services to address these deficits and reach maximum level of function.       Plan:     Patient to be seen 5 x/week to address the above listed problems via self-care/home management, therapeutic exercises, therapeutic activities  · Plan of Care Expires: 12/01/19  · Plan of Care Reviewed with: patient    Subjective     Pain/Comfort:  · Pain Rating 1: 0/10  · Pain Rating Post-Intervention 1: 0/10    Objective:     Communicated with: nurse prior to session.  Patient found HOB elevated with blood pressure cuff, oxygen, telemetry, pressure relief boots, pulse ox (continuous), SCD, peripheral IV upon OT entry to room.    General Precautions: Standard, fall, aspiration   Orthopedic Precautions:N/A   Braces: N/A     Occupational Performance:     Bed Mobility:    · Patient completed Rolling/Turning to Left with  minimum assistance and with side rail  · Patient completed Rolling/Turning to Right with minimum assistance and with side rail  · Patient completed Scooting max assist x 2  · /Bridging with minimum assistance.  · Patient completed  Supine to Sit with maximal assistance and 2 persons  · Patient completed Sit to Supine with maximal assistance and 2 persons       AMPAC 6 Click ADL: 9    Treatment & Education:  Pt sat 12 min EOB initially with max assist for balance with heavy posterior leaning and  moments of sitting using her arms to support her on the bed~ 15 sec increments with SBA.  Pt peformed BUE AAROM ~ 5 reps with 5 sec isometric hold and slow reversal for shld flex/ext/abld/abd. Pt easily fatigued and bed seeking throughout but max education to redirect and increase OOB time.       Patient left left sidelying with all lines intact, call button in reach and nurse notifiedEducation:      GOALS:   Multidisciplinary Problems     Occupational Therapy Goals        Problem: Occupational Therapy Goal    Goal Priority Disciplines Outcome Interventions   Occupational Therapy Goal     OT, PT/OT Ongoing, Progressing    Description:  Goals to be met by: 12/1     Patient will increase functional independence with ADLs by performing:  MET 11/1/19 Pt will maintain gaze x 30 secs  MET 11/1/19 Pt will follow 1 step commands 25% of the time  Pt's family will be indep w/PROM    New Goals set 11/4/2019  Pt will complete AROM shoulder flex/ext through 75% of available AROM.  Pt will complete simple grooming task min A seated with HOB elevated.                            Time Tracking:     OT Date of Treatment: 11/06/19  OT Start Time: 1349  OT Stop Time: 1430  OT Total Time (min): 41 min    Billable Minutes:Therapeutic Activity 15  Therapeutic Exercise 9  Total Time 24    Stephanie Conroy OT  11/6/2019

## 2019-11-06 NOTE — PROGRESS NOTES
[facility]  Infectious Disease  Progress Note    Patient Name: Annette Garcia  MRN: [id number]  Admission Date: 10/27/2019  Length of Stay: 9 days  Attending Physician: Ryan Espinosa MD  Primary Care Provider: Jose Valles MD    Isolation Status: No active isolations  Assessment/Plan:      E coli bacteremia  Ms. Garcia is an 85 yo female with E. Coli sepsis 10/28 s/p sigmoid colectomy for bowel necrosis on 10/29/19. Repeat BC negative 11/1/19.  She has multiple allergies to antibiotics; vanco discontinued 11/2; ertapenem discontinued 11/3; cefazolin started 11/3 and flagyl started 11/3.     Rec:  Watch abdominal exam carefully  Recommend 2 weeks antibiotics past 11/1 (which was first negative BC date) - so stop date should be around 11/15.   Continue cefazolin IVPB for now; metronidazole changed to PO 11/6          Anticipated Disposition: unclear at this point    Thank you for your consult. I will follow-up with patient. Please contact us if you have any additional [phone]    Ingrid Marte MD  Infectious Disease  [facility]    Subjective:     Principal Problem:Colon necrosis    HPI: Annette Garcia is an 84 year old white woman with peripheral vascular disease, renovascular hypertension, renal artery stenosis status post left renal artery stent placement on 7/19/17, coronary artery disease with history of myocardial infarction, diffuse large B cell lymphoma, anemia, epilepsy, hypothyroidism, chronic hyponatremia, depression, lumbar and sacroiliac joint osteoarthritis with chronic low back pain and history of lumbar spine surgery in 2013, slow transit constipation, history of appendectomy, history of cholecystectomy history hysterectomy, history of small bowel obstruction status post small bowel resection on 8/23/16.  She presented to the hospital with abdominal pain, difficulty urinating and constipation x 1 day.  In the ED, she was found to be in  septic shock and required pressors.  CT performed there showed constipation and peritoneal free fluid.  Blood cultures growing pan-sensitive E. Coli.  Patient was taken to the OR on 10/29 and underwent sigmoid colectomy with end colostomy for findings of sigmoid necrosis and large amounts of turbid fluid in abdomen.  Patient was kept intubated following surgery.  She was also found to by hyponatremic with altered mental status.  Patient remains intubated and on pressor support in ICU.  History is obtained by chart review.  She is currently being treated with ertapenem.   ID consult called on 11/1 for help with antibiotics.   11/2 extubated; stop vancomycin  11/3 stopped ertapenem and changed to cefazolin and flagyl  11/4 in and out cath done for urinary retention over night; off pressors; on crrt  11/5 ate food today; no pressors; crrt continues  11/6 doing well on room air, eating OK      Interval History: Patient doing well - eating OK. No complaints. Not on CRRT today. Being transferred to floor today. Discussed with pharmacy - ok to change flagyl to PO.     Review of Systems   Respiratory: Negative for shortness of breath.    Gastrointestinal: Negative for diarrhea, nausea and vomiting.     Antibiotics (From admission, onward)    Start     Stop Route Frequency Ordered    11/06/19 1700  metroNIDAZOLE tablet 500 mg      -- Oral Every 8 hours 11/06/19 1614    11/03/19 1045  ceFAZolin (ANCEF) 1 gram in dextrose 5 % 50 mL IVPB (premix)      -- IV Every 12 hours (non-standard times) 11/03/19 0939      .ant  Objective:     Vital Signs (Most Recent):  Temp: 98.1 °F (36.7 °C) (11/06/19 1115)  Pulse: 78 (11/06/19 1500)  Resp: (!) 21 (11/06/19 1500)  BP: (!) 140/65 (11/06/19 1500)  SpO2: 97 % (11/06/19 1500) Vital Signs (24h Range):  Temp:  [98.1 °F (36.7 °C)-99 °F (37.2 °C)] 98.1 °F (36.7 °C)  Pulse:  [76-87] 78  Resp:  [19-26] 21  SpO2:  [97 %-100 %] 97 %  BP: (133-167)/(61-97) 140/65     Weight: 70.6 kg (155 lb 10.3  oz)  Body mass index is 30.4 kg/m².    Estimated Creatinine Clearance: 15.8 mL/min (A) (based on SCr of 2.4 mg/dL (H)).    Physical Exam   Cardiovascular: Normal heart sounds.   Pulmonary/Chest: Breath sounds normal. No respiratory distress.   Abdominal: Soft. Bowel sounds are normal. She exhibits no distension. There is no tenderness.   Has colostomy in place and has midline abdominal incision with no drainage or erythema.    Musculoskeletal: She exhibits edema.   Has dependent edema 2 + arms and legs   Neurological: She is alert.       Significant Labs:   Blood Culture:   Recent Labs   Lab 10/28/19  0049 11/01/19  1335 11/01/19  1516   LABBLOO Gram stain aer bottle: Gram negative rods   Results called to and read back by: Kinga Bateman RN  10/28/2019  19:01  ESCHERICHIA COLI* No Growth to date  No Growth to date  No Growth to date  No Growth to date  No Growth to date No Growth to date  No Growth to date  No Growth to date  No Growth to date  No Growth to date     CMP:   Recent Labs   Lab 11/05/19  0357 11/05/19  0801 11/05/19  1703 11/06/19  0245    138 142 140   K 3.7 3.8 5.0 3.5    105 109 106   CO2 20* 21* 19* 22*    98 117* 112*   BUN 72* 73* 79* 71*   CREATININE 2.5* 2.5* 2.6* 2.4*   CALCIUM 6.6* 6.7* 6.4* 6.5*   PROT 4.3*  --   --  4.6*   ALBUMIN 2.0* 2.0* 1.9* 2.1*   BILITOT 0.4  --   --  0.4   ALKPHOS 123  --   --  112   AST 53*  --   --  42*   ALT 17  --   --  8*   ANIONGAP 13 12 14 12   EGFRNONAA 17* 17* 17* 18*     Urine Studies:   Recent Labs   Lab 10/28/19  1724 11/04/19  0412   COLORU Orange* Yellow   APPEARANCEUA Clear Clear   PHUR 5.0 6.0   SPECGRAV 1.025 1.010   PROTEINUA 2+* Negative   GLUCUA Negative Negative   KETONESU 1+* Negative   BILIRUBINUA 2+* Negative   OCCULTUA Trace* 1+*   NITRITE Positive* Negative   UROBILINOGEN 2.0-3.0* Negative   LEUKOCYTESUR Negative Negative   RBCUA 10* 2   WBCUA 5  --    BACTERIA Many*  --    SQUAMEPITHEL 2  --    HYALINECASTS 0  --         Significant Imagin/1 cxr - Right internal jugular line has its tip in the superior vena cava.  Enteric tube approximately 3 cm above jesús level.  Additional tube/line appear unchanged.  No change in the cardiopulmonary status

## 2019-11-06 NOTE — PROGRESS NOTES
Pharmacist Intervention IV to PO Note    Annette Garcia is a 84 y.o. female being treated with IV medication metronidazole    Patient Data:    Vital Signs (Most Recent):  Temp: 98.1 °F (36.7 °C) (11/06/19 1115)  Pulse: 78 (11/06/19 1500)  Resp: (!) 21 (11/06/19 1500)  BP: (!) 140/65 (11/06/19 1500)  SpO2: 97 % (11/06/19 1500)   Vital Signs (72h Range):  Temp:  [97.6 °F (36.4 °C)-99 °F (37.2 °C)]   Pulse:  []   Resp:  [19-34]   BP: ()/(45-97)   SpO2:  [87 %-100 %]      CBC:  Recent Labs   Lab 11/03/19  0349 11/04/19  0339 11/04/19  1525   WBC 9.69 10.66 12.26   RBC 2.48* 2.16* 2.18*   HGB 8.0* 7.0* 7.1*   HCT 23.9* 21.4* 22.0*   PLT 59* 78* 77*   MCV 96 99* 101*   MCH 32.3* 32.4* 32.6*   MCHC 33.5 32.7 32.3     CMP:     Recent Labs   Lab 11/04/19  0339 11/05/19  0357 11/05/19  0801 11/05/19  1703 11/06/19  0245   GLU 98 104 98 117* 112*   CALCIUM 7.0* 6.6* 6.7* 6.4* 6.5*   ALBUMIN 1.8*  1.8* 2.0* 2.0* 1.9* 2.1*   PROT 4.2* 4.3*  --   --  4.6*    138 138 142 140   K 3.8 3.7 3.8 5.0 3.5   CO2 22* 20* 21* 19* 22*    105 105 109 106   BUN 81* 72* 73* 79* 71*   CREATININE 2.7* 2.5* 2.5* 2.6* 2.4*   ALKPHOS 133 123  --   --  112   ALT 29 17  --   --  8*   AST 63* 53*  --   --  42*   BILITOT 0.5 0.4  --   --  0.4       Dietary Orders:  Diet Orders            Dietary nutrition supplements [facility]; Novasource Renal - Any flavor starting at 11/05 1715    Dietary nutrition supplements [facility]; Boost Plus - Any flavor starting at 11/05 1200    Diet Adult Regular (IDDSI Level 7): Regular starting at 11/05 0853            Based on the following criteria, this patient qualifies for intravenous to oral conversion:  [x] The patients gastrointestinal tract is functioning (tolerating medications via oral or enteral route for 24 hours and tolerating food or enteral feeds for 24 hours).  [x] The patient is hemodynamically stable for 24 hours (heart rate <100 beats per minute, systolic blood  pressure >99 mm Hg, and respiratory rate <20 breaths per minute).  [x] The patient shows clinical improvement (afebrile for at least 24 hours and white blood cell count downtrending or normalized). Additionally, the patient must be non-neutropenic (absolute neutrophil count >500 cells/mm3).  [x] For antimicrobials, the patient has received IV therapy for at least 24 hours.    IV medication metronidazole 500mg q8h  will be changed to oral medication metronidazole 500mg q8h    Pharmacist's Name: Trudy Thomas  Pharmacist's Extension: [phone]

## 2019-11-06 NOTE — PT/OT/SLP PROGRESS
Physical Therapy Treatment    Patient Name:  Annette Garcia   MRN:  [id number]    Recommendations:     Discharge Recommendations:  nursing facility, skilled   Discharge Equipment Recommendations: (TBD)   Barriers to discharge: pt requires increased level of assist    Assessment:     Annette Garcia is a 84 y.o. female admitted with a medical diagnosis of Colon necrosis.  She presents with the following impairments/functional limitations:  weakness, impaired endurance, gait instability, impaired functional mobilty, impaired self care skills, impaired balance, decreased upper extremity function, decreased lower extremity function, decreased ROM, decreased safety awareness, edema, impaired fine motor, impaired skin, impaired cardiopulmonary response to activity, impaired coordination Patient progressing toward goals; able to sit at EOB x 12 min with maxA to maintain and brief period of 15 sec with SBA; cont with POC..    Rehab Prognosis: Fair+ to good-; patient would benefit from acute skilled PT services to address these deficits and reach maximum level of function.    Recent Surgery: Procedure(s) (LRB):  Insertion, Pacemaker, Temporary Transvenous (Right) 7 Days Post-Op    Plan:     During this hospitalization, patient to be seen 6 x/week to address the identified rehab impairments via gait training, therapeutic activities, therapeutic exercises, neuromuscular re-education(as appropriate) and progress toward the following goals:    · Plan of Care Expires:  12/01/19    Subjective     Chief Complaint: nausea; informed nurse and nurse administered anti nausea meds which helped; also c/o fatigue    Pain/Comfort:  · Pain Rating 1: 0/10  · Pain Rating Post-Intervention 1: 0/10      Objective:     Communicated with nurse prior to session.  Patient found with colostomy, peripheral IV, telemetry, SCD, gonzales catheter, oxygen, blood pressure cuff, pressure relief boots upon PT entry to room.     General Precautions:  Standard, fall, aspiration   Orthopedic Precautions:N/A   Braces: N/A     Functional Mobility:  · Bed Mobility:     · Rolling Left:  minimum assistance with use of side rail and VCs for effective technique  · Rolling Right: minimum assistance with use of side rail and VCs for effective technique  · Scooting: maximal assistance, of 2 persons toward HOB in supine with drawsheet and pt assisting with LEs propped  · Bridging/shifting: min A   · Supine to Sit: maximal assistance and of 2 persons  · Sit to Supine: maximal assistance and of 2 persons      AM-PAC 6 CLICK MOBILITY  Turning over in bed (including adjusting bedclothes, sheets and blankets)?: 3  Sitting down on and standing up from a chair with arms (e.g., wheelchair, bedside commode, etc.): 1  Moving from lying on back to sitting on the side of the bed?: 2  Moving to and from a bed to a chair (including a wheelchair)?: 1  Need to walk in hospital room?: 1  Climbing 3-5 steps with a railing?: 1  Basic Mobility Total Score: 9       Therapeutic Activities and Exercises:   Patient performed skills as listed above; pt performed BLE A/AAROM ankle pumps/circles x 20 reps, TKEs x 12 reps, heel slides x 12 reps, hip abd/add x 12 reps, SLR x 12 reps; heel cord stretches 2x 30 sec each; pt transitioned to sitting EOB; max A to maintain primarily; working on postural control, leaning forward and static sit; able to hold self x 15 sec; VCs for head lift as pt gradually holds head down and to L; able to perform 10 APs and 5 FAQ in sitting; sit to supine with maxA x 2 and rolled multiple times to remove pads/sheets, cleanse and replace sheets; repositioned toward HOB with drawsheet.    Patient left left sidelying with all lines intact, call button in reach and nurse present..    GOALS:   Multidisciplinary Problems     Physical Therapy Goals        Problem: Physical Therapy Goal    Goal Priority Disciplines Outcome Goal Variances Interventions   Physical Therapy Goal     PT,  PT/OT Ongoing, Progressing     Description:  1.  Roll bilaterally with supervision  2.  Supine to/from sit with supervision  3.  Bed to/from chair with CG  4.  Ambulate 50' with RW with CG  5.  Sit in chair 2 hours           Problem: Physical Therapy Goal    Goal Priority Disciplines Outcome Goal Variances Interventions   Physical Therapy Goal     PT, PT/OT Ongoing, Not Progressing     Description:  Goals to be met by:      Patient will increase functional independence with mobility by performin. Supine to sit with Moderate Assistance  2. Sit to supine with Moderate Assistance  3. Rolling to Left and Right with Moderate Assistance.  4. Sit to stand transfer with Moderate Assistance using RW  5. Bed to chair transfer with Minimal Assistance and Moderate Assistance using Rolling Walker  6. Gait  x 25 feet with Minimal Assistance and Moderate Assistance using Rolling Walker.   7. Lower extremity exercise program x10 with active BLE with assistance as needed                      Time Tracking:     PT Received On: 19  PT Start Time: 1335     PT Stop Time: 1430  PT Total Time (min): 55 min overlap with OT    Billable Minutes: Therapeutic Activity 15 minutes and Therapeutic Exercise 16 minutes    Treatment Type: Treatment  PT/PTA: PT     PTA Visit Number: 0     Kinga Velásquez, PT  2019

## 2019-11-06 NOTE — PLAN OF CARE
I discussed with pt that PT/OT recs SNF.  She states she went to [facility] in the past.  I asked if she needs SNF again does she want to go back there and she said I want my kids to decide.  No family at bedside at this time.  Will discuss with family.     11/06/19 1542   Discharge Assessment   Assessment Type Discharge Planning Reassessment   Confirmed/corrected address and phone number on facesheet? Yes   Assessment information obtained from? Patient     Phil Vallejo RN,   [phone]

## 2019-11-06 NOTE — PLAN OF CARE
Problem: Physical Therapy Goal  Goal: Physical Therapy Goal  Description  1.  Roll bilaterally with supervision  2.  Supine to/from sit with supervision  3.  Bed to/from chair with CG  4.  Ambulate 50' with RW with CG  5.  Sit in chair 2 hours   Outcome: Ongoing, Progressing   Patient progressing toward goals; able to sit at EOB x 12 min with maxA to maintain and brief period of 15 sec with SBA; cont with POC.

## 2019-11-06 NOTE — PROGRESS NOTES
"[facility]  General Surgery  Progress Note    Subjective:     History of Present Illness:  No notes on file    Post-Op Info:  Procedure(s) (LRB):  Insertion, Pacemaker, Temporary Transvenous (Right)   7 Days Post-Op     Interval History: No acute events overnight. VSS, AF. Breathing comfortably on RA. Ostomy with consistent appearance but good function, no abdominal pain.  Tolerating regular diet now with supplements, noting she is eating the regular diet well "although it tastes like cardboard"-stated with a smile on her face. Off all pressors, tolerating CRRT. Martinez replaced secondary to continued urinary retention. Cr downtrending on am labs (2.6 to 2.4).     Medications:  Continuous Infusions:    Scheduled Meds:   ceFAZolin (ANCEF) IVPB  1 g Intravenous Q12H    epoetin moe-ebpx (RETACRIT) injection  20,000 Units Subcutaneous Every Tues, Thurs, Sat    heparin (porcine)  5,000 Units Subcutaneous Q8H    iron sucrose  100 mg Intravenous Every Mon, Wed, Fri    levothyroxine  125 mcg Oral Before breakfast    metronidazole  500 mg Intravenous Q8H    OXcarbazepine  300 mg Oral QHS    PHENobarbital  64.8 mg Oral Nightly    pravastatin  20 mg Oral Daily     PRN Meds:acetaminophen, albuterol-ipratropium, heparin, porcine (PF), morphine, ondansetron, promethazine (PHENERGAN) IVPB, ramelteon, sodium chloride 0.9%     Review of patient's allergies indicates:   Allergen Reactions    Adhesive Itching and Blisters    Penicillins Anaphylaxis    Tramadol Hives    Avelox [moxifloxacin] Rash     Facial and arm itching and redness. Pt states throat closes when given.    Amoxil [amoxicillin]     Aspridrox [aspirin, buffered]     Codeine Other (See Comments)     Throat swelling    Keflex [cephalexin]      Tolerated cefepime    Norvasc [amlodipine]     Red dye Hives    Robitussin [guaifenesin]     Sulfa (sulfonamide antibiotics)     Tylenol [acetaminophen]      Has reaction to Tylenol with red " dye and unable to take Extra Strength Tylenol/ CAN ONLY TOLERATE REG STRENGTH TYLENOL    Vicks vaporub [camphor-eucalyptus oil-menthol]      Objective:     Vital Signs (Most Recent):  Temp: 98.2 °F (36.8 °C) (11/06/19 0715)  Pulse: 84 (11/06/19 0814)  Resp: (!) 23 (11/06/19 0814)  BP: (!) 145/66 (11/06/19 0600)  SpO2: 100 % (11/06/19 0814) Vital Signs (24h Range):  Temp:  [98.2 °F (36.8 °C)-99 °F (37.2 °C)] 98.2 °F (36.8 °C)  Pulse:  [76-86] 84  Resp:  [20-26] 23  SpO2:  [98 %-100 %] 100 %  BP: (130-164)/(60-97) 145/66     Weight: 70.6 kg (155 lb 10.3 oz)  Body mass index is 30.4 kg/m².    Intake/Output - Last 3 Shifts       11/04 0700 - 11/05 0659 11/05 0700 - 11/06 0659 11/06 0700 - 11/07 0659    P.O. 650 380     Other 120      NG/GT       IV Piggyback 650 400     TPN       Total Intake(mL/kg) 1420 (20.1) 780 (11)     Urine (mL/kg/hr) 1400 (0.8) 2170 (1.3)     Other 345      Stool 50 285     Total Output 1795 2455     Net -375 -1675            Stool Occurrence 1 x            Physical Exam   Constitutional: She appears well-developed. She appears lethargic. No distress.   Cardiovascular: Normal rate and regular rhythm.   Abdominal: Soft. There is no guarding.   Ostomy with stool/gas in bag. Ostomy mildly edematous, dusky in appearance   Musculoskeletal: She exhibits edema. She exhibits no tenderness.   Neurological: She appears lethargic.   Nursing note and vitals reviewed.      Significant Labs:  CBC:   Recent Labs   Lab 11/04/19  1525   WBC 12.26   RBC 2.18*   HGB 7.1*   HCT 22.0*   PLT 77*   *   MCH 32.6*   MCHC 32.3     CMP:   Recent Labs   Lab 11/06/19  0245   *   CALCIUM 6.5*   ALBUMIN 2.1*   PROT 4.6*      K 3.5   CO2 22*      BUN 71*   CREATININE 2.4*   ALKPHOS 112   ALT 8*   AST 42*   BILITOT 0.4         Significant Diagnostics:  I have reviewed all pertinent imaging results/findings within the past 24 hours.    Assessment/Plan:     Abdominal pain  84-year-old female s/p sigmoid  colectomy with end colostomy 10/30/19.      Plan:  continue abx; per ID stop date for IV cefazolin/metronidazole 11/15/19  Continue CRRT; appreciate nephrology assistance  PT/OT  Continue regular diet, boost, suplena   stop TPN  Martinez in place; remove as able          Selam Hanson MD  General Surgery  [facility]

## 2019-11-06 NOTE — ASSESSMENT & PLAN NOTE
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      Plan:  continue abx; per ID stop date for IV cefazolin/metronidazole 11/15/19  Continue CRRT; appreciate nephrology assistance  PT/OT  Continue regular diet, boost, suplena   stop TPN  Martinez in place; remove as able

## 2019-11-06 NOTE — SUBJECTIVE & OBJECTIVE
"Interval History: No acute events overnight. VSS, AF. Breathing comfortably on RA. Ostomy with consistent appearance but good function, no abdominal pain.  Tolerating regular diet now with supplements, noting she is eating the regular diet well "although it tastes like cardboard"-stated with a smile on her face. Off all pressors, tolerating CRRT. Martinez replaced secondary to continued urinary retention. Cr downtrending on am labs (2.6 to 2.4).     Medications:  Continuous Infusions:    Scheduled Meds:   ceFAZolin (ANCEF) IVPB  1 g Intravenous Q12H    epoetin moe-ebpx (RETACRIT) injection  20,000 Units Subcutaneous Every Tues, Thurs, Sat    heparin (porcine)  5,000 Units Subcutaneous Q8H    iron sucrose  100 mg Intravenous Every Mon, Wed, Fri    levothyroxine  125 mcg Oral Before breakfast    metronidazole  500 mg Intravenous Q8H    OXcarbazepine  300 mg Oral QHS    PHENobarbital  64.8 mg Oral Nightly    pravastatin  20 mg Oral Daily     PRN Meds:acetaminophen, albuterol-ipratropium, heparin, porcine (PF), morphine, ondansetron, promethazine (PHENERGAN) IVPB, ramelteon, sodium chloride 0.9%     Review of patient's allergies indicates:   Allergen Reactions    Adhesive Itching and Blisters    Penicillins Anaphylaxis    Tramadol Hives    Avelox [moxifloxacin] Rash     Facial and arm itching and redness. Pt states throat closes when given.    Amoxil [amoxicillin]     Aspridrox [aspirin, buffered]     Codeine Other (See Comments)     Throat swelling    Keflex [cephalexin]      Tolerated cefepime    Norvasc [amlodipine]     Red dye Hives    Robitussin [guaifenesin]     Sulfa (sulfonamide antibiotics)     Tylenol [acetaminophen]      Has reaction to Tylenol with red dye and unable to take Extra Strength Tylenol/ CAN ONLY TOLERATE REG STRENGTH TYLENOL    Vicks vaporub [camphor-eucalyptus oil-menthol]      Objective:     Vital Signs (Most Recent):  Temp: 98.2 °F (36.8 °C) (11/06/19 0715)  Pulse: 84 " (11/06/19 0814)  Resp: (!) 23 (11/06/19 0814)  BP: (!) 145/66 (11/06/19 0600)  SpO2: 100 % (11/06/19 0814) Vital Signs (24h Range):  Temp:  [98.2 °F (36.8 °C)-99 °F (37.2 °C)] 98.2 °F (36.8 °C)  Pulse:  [76-86] 84  Resp:  [20-26] 23  SpO2:  [98 %-100 %] 100 %  BP: (130-164)/(60-97) 145/66     Weight: 70.6 kg (155 lb 10.3 oz)  Body mass index is 30.4 kg/m².    Intake/Output - Last 3 Shifts       11/04 0700 - 11/05 0659 11/05 0700 - 11/06 0659 11/06 0700 - 11/07 0659    P.O. 650 380     Other 120      NG/GT       IV Piggyback 650 400     TPN       Total Intake(mL/kg) 1420 (20.1) 780 (11)     Urine (mL/kg/hr) 1400 (0.8) 2170 (1.3)     Other 345      Stool 50 285     Total Output 1795 2455     Net -375 -1675            Stool Occurrence 1 x            Physical Exam   Constitutional: She appears well-developed. She appears lethargic. No distress.   Cardiovascular: Normal rate and regular rhythm.   Abdominal: Soft. There is no guarding.   Ostomy with stool/gas in bag. Ostomy mildly edematous, dusky in appearance   Musculoskeletal: She exhibits edema. She exhibits no tenderness.   Neurological: She appears lethargic.   Nursing note and vitals reviewed.      Significant Labs:  CBC:   Recent Labs   Lab 11/04/19  1525   WBC 12.26   RBC 2.18*   HGB 7.1*   HCT 22.0*   PLT 77*   *   MCH 32.6*   MCHC 32.3     CMP:   Recent Labs   Lab 11/06/19  0245   *   CALCIUM 6.5*   ALBUMIN 2.1*   PROT 4.6*      K 3.5   CO2 22*      BUN 71*   CREATININE 2.4*   ALKPHOS 112   ALT 8*   AST 42*   BILITOT 0.4         Significant Diagnostics:  I have reviewed all pertinent imaging results/findings within the past 24 hours.

## 2019-11-06 NOTE — PHYSICIAN QUERY
PT Name: Annette Garcia  MR #: [id number]    Physician Query Form - Nutrition Clarification     CDS: Little DHALIWAL,RN        Contact information:[phone]    This form is a permanent document in the medical record.     Query Date: November 6, 2019    By submitting this query, we are merely seeking further clarification of documentation.. Please utilize your independent clinical judgment when addressing the question(s) below.    The Medical record contains the following:   Indicators  Supporting Clinical Findings Location in Medical Record    % of Estimated Energy Intake over a time frame from p.o., TF, or TPN      Weight Status over a time frame     x Subcutaneous Fat and/or Muscle Loss [place] (Muscle Loss): mild depletion  Clavicle Bone Region (Muscle Loss): mild depletion        10/31/19 Adult Nutrition Consult    Note    Fluid Accumulation or Edema      Reduced  Strength     x Wt / BMI / Usual Body Weight WT= 154.76 lbs  BMI= 30.3 10/31/19 Adult Nutrition Consult    Note    Delayed Wound Healing / Failure to Thrive     x Acute or Chronic Illness Colon necrosis,petit mal epilepsy, seizures colon cancer, intubated on vent 10/31/19 Adult Nutrition Consult    Note    Medication     x Treatment 1. Add IVFE 3x weekly.   2. Continue current TPN as tolerated.   3. Monitor triglycerides while lipids infusing  Current Nutrition Support Formula Ordered: Clinimix E 5/15  Current Nutrition Support Rate Ordered: 75 (ml)     10/31/19 Adult Nutrition Consult    Note   x Other NFPE completed today 10/31-mild wasting of temples & clavicles 10/31/19 Adult Nutrition Consult    Note     AND / ASPEN Clinical Characteristics (October 2011)  A minimum of two characteristics is recommended for diagnosing either moderate or severe malnutrition   Mild Malnutrition Moderate Malnutrition Severe Malnutrition   Energy Intake from p.o., TF or TPN. < 75% intake of estimated energy needs for less than 7 days < 75% intake  of estimated energy needs for greater than 7 days < 50% intake of estimated energy needs for > 5 days   Weight Loss 1-2% in 1 month  5% in 3 months  7.5% in 6 months  10% in 1 year 1-2 % in 1 week  5% in 1 month  7.5% in 3 months  10% in 6 months  20% in 1 year > 2% in 1 week  > 5% in 1 month  > 7.5% in 3 months  > 10% in 6 months  > 20% in 1 year   Physical Findings     None *Mild subcutaneous fat and/or muscle loss  *Mild fluid accumulation  *Stage II decubitus  *Surgical wound or non-healing wound *Mod/severe subcutaneous fat and/or muscle loss  *Mod/severe fluid accumulation  *Stage III or IV decubitus  *Non-healing surgical wound     Provider, please specify diagnosis or diagnoses associated with above clinical findings.    [  ] Mild Protein-Calorie Malnutrition   [ x ] Moderate Protein-Calorie Malnutrition   [  ] Other Nutritional Diagnosis (please specify):    [  ] Other:    [  ] Clinically Undetermined       Please document in your progress notes daily for the duration of treatment until resolved and include in your discharge summary.

## 2019-11-06 NOTE — PLAN OF CARE
Problem: Occupational Therapy Goal  Goal: Occupational Therapy Goal  Description  Goals to be met by: 12/1     Patient will increase functional independence with ADLs by performing:  MET 11/1/19 Pt will maintain gaze x 30 secs  MET 11/1/19 Pt will follow 1 step commands 25% of the time  Pt's family will be indep w/PROM    New Goals set 11/4/2019  Pt will complete AROM shoulder flex/ext through 75% of available AROM.  Pt will complete simple grooming task min A seated with HOB elevated.           Outcome: Ongoing, Progressing

## 2019-11-06 NOTE — SUBJECTIVE & OBJECTIVE
Interval History: Patient doing well - eating OK. No complaints. Not on CRRT today. Being transferred to floor today. Discussed with pharmacy - ok to change flagyl to PO.     Review of Systems   Respiratory: Negative for shortness of breath.    Gastrointestinal: Negative for diarrhea, nausea and vomiting.     Antibiotics (From admission, onward)    Start     Stop Route Frequency Ordered    11/06/19 1700  metroNIDAZOLE tablet 500 mg      -- Oral Every 8 hours 11/06/19 1614    11/03/19 1045  ceFAZolin (ANCEF) 1 gram in dextrose 5 % 50 mL IVPB (premix)      -- IV Every 12 hours (non-standard times) 11/03/19 0939      .ant  Objective:     Vital Signs (Most Recent):  Temp: 98.1 °F (36.7 °C) (11/06/19 1115)  Pulse: 78 (11/06/19 1500)  Resp: (!) 21 (11/06/19 1500)  BP: (!) 140/65 (11/06/19 1500)  SpO2: 97 % (11/06/19 1500) Vital Signs (24h Range):  Temp:  [98.1 °F (36.7 °C)-99 °F (37.2 °C)] 98.1 °F (36.7 °C)  Pulse:  [76-87] 78  Resp:  [19-26] 21  SpO2:  [97 %-100 %] 97 %  BP: (133-167)/(61-97) 140/65     Weight: 70.6 kg (155 lb 10.3 oz)  Body mass index is 30.4 kg/m².    Estimated Creatinine Clearance: 15.8 mL/min (A) (based on SCr of 2.4 mg/dL (H)).    Physical Exam   Cardiovascular: Normal heart sounds.   Pulmonary/Chest: Breath sounds normal. No respiratory distress.   Abdominal: Soft. Bowel sounds are normal. She exhibits no distension. There is no tenderness.   Has colostomy in place and has midline abdominal incision with no drainage or erythema.    Musculoskeletal: She exhibits edema.   Has dependent edema 2 + arms and legs   Neurological: She is alert.       Significant Labs:   Blood Culture:   Recent Labs   Lab 10/28/19  0049 11/01/19  1335 11/01/19  1516   LABBLOO Gram stain aer bottle: Gram negative rods   Results called to and read back by: Kinga Bateman RN  10/28/2019  19:01  ESCHERICHIA COLI* No Growth to date  No Growth to date  No Growth to date  No Growth to date  No Growth to date No Growth to date   No Growth to date  No Growth to date  No Growth to date  No Growth to date     CMP:   Recent Labs   Lab 19  0357 19  0801 19  1703 19  0245    138 142 140   K 3.7 3.8 5.0 3.5    105 109 106   CO2 20* 21* 19* 22*    98 117* 112*   BUN 72* 73* 79* 71*   CREATININE 2.5* 2.5* 2.6* 2.4*   CALCIUM 6.6* 6.7* 6.4* 6.5*   PROT 4.3*  --   --  4.6*   ALBUMIN 2.0* 2.0* 1.9* 2.1*   BILITOT 0.4  --   --  0.4   ALKPHOS 123  --   --  112   AST 53*  --   --  42*   ALT 17  --   --  8*   ANIONGAP 13 12 14 12   EGFRNONAA 17* 17* 17* 18*     Urine Studies:   Recent Labs   Lab 10/28/19  1724 19  0412   COLORU Orange* Yellow   APPEARANCEUA Clear Clear   PHUR 5.0 6.0   SPECGRAV 1.025 1.010   PROTEINUA 2+* Negative   GLUCUA Negative Negative   KETONESU 1+* Negative   BILIRUBINUA 2+* Negative   OCCULTUA Trace* 1+*   NITRITE Positive* Negative   UROBILINOGEN 2.0-3.0* Negative   LEUKOCYTESUR Negative Negative   RBCUA 10* 2   WBCUA 5  --    BACTERIA Many*  --    SQUAMEPITHEL 2  --    HYALINECASTS 0  --        Significant Imagin/1 cxr - Right internal jugular line has its tip in the superior vena cava.  Enteric tube approximately 3 cm above jesús level.  Additional tube/line appear unchanged.  No change in the cardiopulmonary status

## 2019-11-06 NOTE — ASSESSMENT & PLAN NOTE
Ms. Garcia is an 83 yo female with E. Coli sepsis 10/28 s/p sigmoid colectomy for bowel necrosis on 10/29/19. Repeat BC negative 11/1/19.  She has multiple allergies to antibiotics; vanco discontinued 11/2; ertapenem discontinued 11/3; cefazolin started 11/3 and flagyl started 11/3.     Rec:  Watch abdominal exam carefully  Recommend 2 weeks antibiotics past 11/1 (which was first negative BC date) - so stop date should be around 11/15.   Continue cefazolin IVPB for now; metronidazole changed to PO 11/6

## 2019-11-06 NOTE — PLAN OF CARE
Pt remains free from falls and injuries. Trialysis, accessed port, colostomy, gonzales remain. Pt repositioned frequently. Plan of care discussed with pt. VSS, no acute issues overnight.

## 2019-11-06 NOTE — PT/OT/SLP PROGRESS
Physical Therapy Treatment    Patient Name:  Annette Garcia   MRN:  [id number]    Recommendations:     Discharge Recommendations:  nursing facility, skilled   Discharge Equipment Recommendations: (TBD)   Barriers to discharge: pt requires increased level of assist    Assessment:     Annette Garcia is a 84 y.o. female admitted with a medical diagnosis of Colon necrosis.  She presents with the following impairments/functional limitations:  weakness, impaired endurance, gait instability, impaired functional mobilty, impaired self care skills, impaired balance, decreased safety awareness, impaired cardiopulmonary response to activity, edema, impaired skin, decreased upper extremity function, decreased lower extremity function, impaired fine motor Patient sat at EOB x 3-4 minutes with poor dynamic balance, requiring mod-maxA to maintain sitting; following commands and assisted with BLE/UE AAROMes; cont with POC; recommend SNF..    Rehab Prognosis: Fair+; patient would benefit from acute skilled PT services to address these deficits and reach maximum level of function.    Recent Surgery: Procedure(s) (LRB):  Insertion, Pacemaker, Temporary Transvenous (Right) 6 Days Post-Op    Plan:     During this hospitalization, patient to be seen 6 x/week to address the identified rehab impairments via gait training, therapeutic activities, therapeutic exercises, neuromuscular re-education(as appropriate) and progress toward the following goals:    · Plan of Care Expires:  12/01/19    Subjective     Pain/Comfort:  · Pain Rating 1: (pt did not rate; report stiffness)  · Location - Side 1: Bilateral  · Location 1: leg  · Pain Addressed 1: Reposition, Distraction, Cessation of Activity  · Pain Rating Post-Intervention 1: (did not rate)      Objective:     Communicated with nurse prior to session.  Patient found with colostomy, peripheral IV, telemetry, SCD, pulse ox (continuous), gonzales catheter, oxygen, blood pressure cuff upon PT  entry to room.     General Precautions: Standard, fall, aspiration, NPO   Orthopedic Precautions:N/A   Braces: N/A     Functional Mobility:  · Bed Mobility:     · Rolling Left:  maximal assistance and of 2 persons  · Scooting: total assistance  · Supine to Sit: maximal assistance and of 2 persons  · Sit to Supine: maximal assistance and of 2 persons      AM-PAC 6 CLICK MOBILITY  Turning over in bed (including adjusting bedclothes, sheets and blankets)?: 2  Sitting down on and standing up from a chair with arms (e.g., wheelchair, bedside commode, etc.): 1  Moving from lying on back to sitting on the side of the bed?: 2  Moving to and from a bed to a chair (including a wheelchair)?: 1  Need to walk in hospital room?: 1  Climbing 3-5 steps with a railing?: 1  Basic Mobility Total Score: 8       Therapeutic Activities and Exercises:   Patient performed limited BUE with OT; BLE AAROM in all ranges of available motion; pt c/o stiffness throughout with LLE >RLE; 10-15 reps APs, ankle circles, heel slides, hip abd/add, hip circumferential movements; pt transitioned to EOB x 3-4 minutes with increased time to complete transitions; required mod-maxA to maintain sitting balance; pt not able to use UEs to prop self; returned to supine and repositioned.    Patient left HOB elevated with all lines intact, call button in reach and nurse present..    GOALS:   Multidisciplinary Problems     Physical Therapy Goals        Problem: Physical Therapy Goal    Goal Priority Disciplines Outcome Goal Variances Interventions   Physical Therapy Goal     PT, PT/OT Ongoing, Progressing     Description:  1.  Roll bilaterally with supervision  2.  Supine to/from sit with supervision  3.  Bed to/from chair with CG  4.  Ambulate 50' with RW with CG  5.  Sit in chair 2 hours           Problem: Physical Therapy Goal    Goal Priority Disciplines Outcome Goal Variances Interventions   Physical Therapy Goal     PT, PT/OT Ongoing, Not Progressing      Description:  Goals to be met by:      Patient will increase functional independence with mobility by performin. Supine to sit with Moderate Assistance  2. Sit to supine with Moderate Assistance  3. Rolling to Left and Right with Moderate Assistance.  4. Sit to stand transfer with Moderate Assistance using RW  5. Bed to chair transfer with Minimal Assistance and Moderate Assistance using Rolling Walker  6. Gait  x 25 feet with Minimal Assistance and Moderate Assistance using Rolling Walker.   7. Lower extremity exercise program x10 with active BLE with assistance as needed                      Time Tracking:     PT Received On: 19  PT Start Time: 1422     PT Stop Time: 1504  PT Total Time (min): 42 min with OT    Billable Minutes: Therapeutic Activity 14 minutes and Therapeutic Exercise 18 minutes    Treatment Type: Treatment  PT/PTA: PT     PTA Visit Number: 0     Kinga Velásquez, PT  2019

## 2019-11-07 ENCOUNTER — TELEPHONE (OUTPATIENT)
Dept: OTOLARYNGOLOGY | Facility: CLINIC | Age: 84
End: 2019-11-07

## 2019-11-07 PROBLEM — D69.6 THROMBOCYTOPENIA: Status: RESOLVED | Noted: 2019-11-01 | Resolved: 2019-11-07

## 2019-11-07 PROBLEM — R49.0 HOARSENESS: Status: ACTIVE | Noted: 2019-11-07

## 2019-11-07 PROBLEM — D65 DIC (DISSEMINATED INTRAVASCULAR COAGULATION): Status: RESOLVED | Noted: 2019-10-29 | Resolved: 2019-11-07

## 2019-11-07 LAB
ALBUMIN SERPL BCP-MCNC: 2.2 G/DL (ref 3.5–5.2)
ALP SERPL-CCNC: 100 U/L (ref 55–135)
ALT SERPL W/O P-5'-P-CCNC: 5 U/L (ref 10–44)
ANION GAP SERPL CALC-SCNC: 11 MMOL/L (ref 8–16)
AST SERPL-CCNC: 40 U/L (ref 10–40)
BILIRUB SERPL-MCNC: 0.4 MG/DL (ref 0.1–1)
BUN SERPL-MCNC: 55 MG/DL (ref 8–23)
CALCIUM SERPL-MCNC: 6.6 MG/DL (ref 8.7–10.5)
CHLORIDE SERPL-SCNC: 106 MMOL/L (ref 95–110)
CO2 SERPL-SCNC: 25 MMOL/L (ref 23–29)
CREAT SERPL-MCNC: 1.9 MG/DL (ref 0.5–1.4)
ERYTHROCYTE [DISTWIDTH] IN BLOOD BY AUTOMATED COUNT: 15.6 % (ref 11.5–14.5)
EST. GFR  (AFRICAN AMERICAN): 28 ML/MIN/1.73 M^2
EST. GFR  (NON AFRICAN AMERICAN): 24 ML/MIN/1.73 M^2
GLUCOSE SERPL-MCNC: 100 MG/DL (ref 70–110)
HCT VFR BLD AUTO: 22 % (ref 37–48.5)
HGB BLD-MCNC: 7 G/DL (ref 12–16)
MAGNESIUM SERPL-MCNC: 1.4 MG/DL (ref 1.6–2.6)
MCH RBC QN AUTO: 32.7 PG (ref 27–31)
MCHC RBC AUTO-ENTMCNC: 31.8 G/DL (ref 32–36)
MCV RBC AUTO: 103 FL (ref 82–98)
PHOSPHATE SERPL-MCNC: 4.1 MG/DL (ref 2.7–4.5)
PLATELET # BLD AUTO: 176 K/UL (ref 150–350)
PLATELET BLD QL SMEAR: NORMAL
PMV BLD AUTO: 10.9 FL (ref 9.2–12.9)
POTASSIUM SERPL-SCNC: 3.4 MMOL/L (ref 3.5–5.1)
PROT SERPL-MCNC: 4.7 G/DL (ref 6–8.4)
RBC # BLD AUTO: 2.14 M/UL (ref 4–5.4)
SODIUM SERPL-SCNC: 142 MMOL/L (ref 136–145)
WBC # BLD AUTO: 8.75 K/UL (ref 3.9–12.7)

## 2019-11-07 PROCEDURE — 84100 ASSAY OF PHOSPHORUS: CPT

## 2019-11-07 PROCEDURE — 97530 THERAPEUTIC ACTIVITIES: CPT

## 2019-11-07 PROCEDURE — 97110 THERAPEUTIC EXERCISES: CPT

## 2019-11-07 PROCEDURE — 25000003 PHARM REV CODE 250: Performed by: HOSPITALIST

## 2019-11-07 PROCEDURE — 11000001 HC ACUTE MED/SURG PRIVATE ROOM

## 2019-11-07 PROCEDURE — 86580 TB INTRADERMAL TEST: CPT | Performed by: NURSE PRACTITIONER

## 2019-11-07 PROCEDURE — 99900035 HC TECH TIME PER 15 MIN (STAT)

## 2019-11-07 PROCEDURE — 80053 COMPREHEN METABOLIC PANEL: CPT

## 2019-11-07 PROCEDURE — 83735 ASSAY OF MAGNESIUM: CPT

## 2019-11-07 PROCEDURE — 97803 MED NUTRITION INDIV SUBSEQ: CPT

## 2019-11-07 PROCEDURE — 63600175 PHARM REV CODE 636 W HCPCS: Performed by: HOSPITALIST

## 2019-11-07 PROCEDURE — 94761 N-INVAS EAR/PLS OXIMETRY MLT: CPT

## 2019-11-07 PROCEDURE — 85027 COMPLETE CBC AUTOMATED: CPT

## 2019-11-07 PROCEDURE — 25000003 PHARM REV CODE 250: Performed by: INTERNAL MEDICINE

## 2019-11-07 PROCEDURE — 30200315 PPD INTRADERMAL TEST REV CODE 302: Performed by: NURSE PRACTITIONER

## 2019-11-07 RX ORDER — LIDOCAINE HYDROCHLORIDE 20 MG/ML
JELLY TOPICAL ONCE
Status: DISCONTINUED | OUTPATIENT
Start: 2019-11-07 | End: 2019-11-08

## 2019-11-07 RX ORDER — POTASSIUM CHLORIDE 20 MEQ/1
40 TABLET, EXTENDED RELEASE ORAL ONCE
Status: COMPLETED | OUTPATIENT
Start: 2019-11-07 | End: 2019-11-07

## 2019-11-07 RX ORDER — LORAZEPAM/0.9% SODIUM CHLORIDE 100MG/0.1L
2 PLASTIC BAG, INJECTION (ML) INTRAVENOUS ONCE
Status: COMPLETED | OUTPATIENT
Start: 2019-11-07 | End: 2019-11-07

## 2019-11-07 RX ADMIN — HEPARIN SODIUM 5000 UNITS: 5000 INJECTION, SOLUTION INTRAVENOUS; SUBCUTANEOUS at 05:11

## 2019-11-07 RX ADMIN — METRONIDAZOLE 500 MG: 500 TABLET ORAL at 03:11

## 2019-11-07 RX ADMIN — CEFAZOLIN SODIUM 1 G: 1 SOLUTION INTRAVENOUS at 09:11

## 2019-11-07 RX ADMIN — HEPARIN SODIUM 5000 UNITS: 5000 INJECTION, SOLUTION INTRAVENOUS; SUBCUTANEOUS at 03:11

## 2019-11-07 RX ADMIN — METRONIDAZOLE 500 MG: 500 TABLET ORAL at 09:11

## 2019-11-07 RX ADMIN — MAGNESIUM SULFATE IN WATER 2 G: 40 INJECTION, SOLUTION INTRAVENOUS at 03:11

## 2019-11-07 RX ADMIN — EPOETIN ALFA-EPBX 20000 UNITS: 10000 INJECTION, SOLUTION INTRAVENOUS; SUBCUTANEOUS at 03:11

## 2019-11-07 RX ADMIN — LEVOTHYROXINE SODIUM 125 MCG: 25 TABLET ORAL at 05:11

## 2019-11-07 RX ADMIN — HEPARIN SODIUM 5000 UNITS: 5000 INJECTION, SOLUTION INTRAVENOUS; SUBCUTANEOUS at 09:11

## 2019-11-07 RX ADMIN — METRONIDAZOLE 500 MG: 500 TABLET ORAL at 05:11

## 2019-11-07 RX ADMIN — PHENOBARBITAL 64.8 MG: 32.4 TABLET ORAL at 09:11

## 2019-11-07 RX ADMIN — TUBERCULIN PURIFIED PROTEIN DERIVATIVE 5 UNITS: 5 INJECTION, SOLUTION INTRADERMAL at 12:11

## 2019-11-07 RX ADMIN — OXCARBAZEPINE 300 MG: 150 TABLET, FILM COATED ORAL at 09:11

## 2019-11-07 RX ADMIN — PRAVASTATIN SODIUM 20 MG: 20 TABLET ORAL at 08:11

## 2019-11-07 RX ADMIN — POTASSIUM CHLORIDE 40 MEQ: 1500 TABLET, EXTENDED RELEASE ORAL at 03:11

## 2019-11-07 NOTE — ASSESSMENT & PLAN NOTE
Continue home levothyroxine 125 mcg before breakfast.  Lab Results   Component Value Date    TSH 0.507 11/01/2019

## 2019-11-07 NOTE — PT/OT/SLP PROGRESS
Physical Therapy Treatment    Patient Name:  Annette Garcia   MRN:  [id number]    Recommendations:     Discharge Recommendations:  nursing facility, skilled   Discharge Equipment Recommendations: (TBD)   Barriers to discharge: pt requires increased level of assist    Assessment:     Annette Garcia is a 84 y.o. female admitted with a medical diagnosis of Colon necrosis.  She presents with the following impairments/functional limitations:  weakness, impaired functional mobilty, gait instability, impaired self care skills, impaired balance, decreased upper extremity function, decreased lower extremity function, impaired coordination, decreased ROM, impaired skin, decreased coordination, decreased safety awareness, impaired cardiopulmonary response to activity Pt continues to progress toward goals.    Rehab Prognosis: Fair+ to good-; patient would benefit from acute skilled PT services to address these deficits and reach maximum level of function.    Recent Surgery: Procedure(s) (LRB):  Insertion, Pacemaker, Temporary Transvenous (Right) 8 Days Post-Op    Plan:     During this hospitalization, patient to be seen 6 x/week to address the identified rehab impairments via gait training, therapeutic activities, therapeutic exercises, neuromuscular re-education(as tolerated) and progress toward the following goals:    · Plan of Care Expires:  12/01/19    Subjective     Chief Complaint: c/o nausea from eating banana pudding; informed nurse; also c/o fatigue    Pain/Comfort:  · Pain Rating 1: 0/10  · Pain Rating Post-Intervention 1: 0/10      Objective:     Communicated with nurse prior to session.  Patient found with bed alarm, gonzales catheter, oxygen, peripheral IV, PICC line, telemetry(JIMBO telesitter) upon PT entry to room.     General Precautions: Standard, fall, aspiration, seizure   Orthopedic Precautions:N/A   Braces: N/A     Functional Mobility:  · Bed Mobility:     · Rolling Left:  minimum assistance and use of side  rail with increased time and effort  · Rolling Right: minimum assistance and use of side rail with increased time and effort  · Scooting: maxA x 2 using drawsheet and pt assisting by pushing with LEs  · Supine to Sit: maximal assistance and use of side rail  · Sit to Supine: maximal assistance and use of side rail      AM-PAC 6 CLICK MOBILITY  Turning over in bed (including adjusting bedclothes, sheets and blankets)?: 3  Sitting down on and standing up from a chair with arms (e.g., wheelchair, bedside commode, etc.): 1  Moving from lying on back to sitting on the side of the bed?: 2  Moving to and from a bed to a chair (including a wheelchair)?: 1  Need to walk in hospital room?: 1  Climbing 3-5 steps with a railing?: 1  Basic Mobility Total Score: 9       Therapeutic Activities and Exercises:   Pt performed skills as described above with VCs for effective technique; pt rolled multiple times for cleansing and changing pads; lifted and flexed LEs throughout; transitioned to EOB with increased time and effort; max A for scooting to EOB; supported feet with pillow on floor; sat at EOB x 12 minutes with SBA/CGA and worked on trunk control/posture with head and chest lift, attaining midline from R lean/shift; performed BLE ex 10 reps toe/heel rises with 3 sec hold, 5 reps FAQ with 1 sec hold, then 5 reps with 3 sec hold, 2 x 3 reps hip fl with 2-3 sec hold; UE ex with OT; returned to supine; minimal stretching to gastroc and Z flex boots placed.    Patient left HOB elevated with all lines intact, call button in reach, bed alarm on and nurse notified..    GOALS:   Multidisciplinary Problems     Physical Therapy Goals        Problem: Physical Therapy Goal    Goal Priority Disciplines Outcome Goal Variances Interventions   Physical Therapy Goal     PT, PT/OT Ongoing, Progressing     Description:  1.  Roll bilaterally with supervision  2.  Supine to/from sit with supervision  3.  Bed to/from chair with CG  4.  Ambulate 50'  with RW with CG  5.  Sit in chair 2 hours           Problem: Physical Therapy Goal    Goal Priority Disciplines Outcome Goal Variances Interventions   Physical Therapy Goal     PT, PT/OT Ongoing, Not Progressing     Description:  Goals to be met by:      Patient will increase functional independence with mobility by performin. Supine to sit with Moderate Assistance  2. Sit to supine with Moderate Assistance  3. Rolling to Left and Right with Moderate Assistance.  4. Sit to stand transfer with Moderate Assistance using RW  5. Bed to chair transfer with Minimal Assistance and Moderate Assistance using Rolling Walker  6. Gait  x 25 feet with Minimal Assistance and Moderate Assistance using Rolling Walker.   7. Lower extremity exercise program x10 with active BLE with assistance as needed                      Time Tracking:     PT Received On: 19  PT Start Time: 1337     PT Stop Time: 1434  PT Total Time (min): 57 min overlap with OT    Billable Minutes: Therapeutic Activity 14 minutes and Therapeutic Exercise 14 minutes    Treatment Type: Treatment  PT/PTA: PT     PTA Visit Number: 0     Kinga Velásquez, PT  2019

## 2019-11-07 NOTE — PROGRESS NOTES
Pharmacy New Medication Education    Patient and/or Caregiver ACCEPTED medication education.    Pharmacy has provided education on the name, indication, and possible side effects of the medication(s) prescribed, using teach-back method.     Learners of pharmacy medication education includes:  patient      The following medications have also been discussed, during this admission.     Current Facility-Administered Medications   Medication Frequency    acetaminophen tablet 650 mg Q4H PRN    albuterol-ipratropium 2.5 mg-0.5 mg/3 mL nebulizer solution 3 mL Q6H PRN    ceFAZolin (ANCEF) 1 gram in dextrose 5 % 50 mL IVPB (premix) Q12H    epoetin moe-epbx injection 20,000 Units Every Tues, Thurs, Sat    heparin (porcine) injection 5,000 Units Q8H    heparin, porcine (PF) 100 unit/mL injection flush 1,000 Units PRN    iron sucrose injection 100 mg Every Mon, Wed, Fri    levothyroxine tablet 125 mcg Before breakfast    metroNIDAZOLE tablet 500 mg Q8H    morphine injection 1 mg Q4H PRN    ondansetron disintegrating tablet 8 mg Q8H PRN    OXcarbazepine tablet 300 mg QHS    PHENobarbital tablet 64.8 mg Nightly    pravastatin tablet 20 mg Daily    promethazine (PHENERGAN) 6.25 mg in dextrose 5 % 50 mL IVPB Q6H PRN    ramelteon tablet 8 mg Nightly PRN    sodium chloride 0.9% flush 10 mL PRN    tuberculin injection 5 Units Once          Thank you  Darrin Allred, PharmD

## 2019-11-07 NOTE — PROGRESS NOTES
Patient trialysis line removed at bedside. Pressure held for 15 minutes. Pressure dressing placed. Patient tolerated removal well.    Selam Hanson MD  General Surgery, PGYII [facility]

## 2019-11-07 NOTE — PT/OT/SLP PROGRESS
Occupational Therapy   Treatment    Name: Annette Garcia  MRN: [id number]  Admitting Diagnosis:  Colon necrosis  8 Days Post-Op    Recommendations:     Discharge Recommendations: nursing facility, skilled  Discharge Equipment Recommendations:  (TBD)  Barriers to discharge:  Decreased caregiver support    Assessment:     Annette Garcia is a 84 y.o. female with a medical diagnosis of Colon necrosis.  She presents with continued progress toward OT goals especially increase in LUE AROM. Performance deficits affecting function are weakness, impaired self care skills, impaired balance, decreased coordination, decreased safety awareness, decreased upper extremity function, decreased lower extremity function, gait instability, impaired functional mobilty, impaired endurance, impaired cardiopulmonary response to activity, impaired coordination, decreased ROM, impaired skin.     Rehab Prognosis:  Good; patient would benefit from acute skilled OT services to address these deficits and reach maximum level of function.       Plan:     Patient to be seen 5 x/week to address the above listed problems via self-care/home management, therapeutic exercises, therapeutic activities  · Plan of Care Expires: 12/01/19  · Plan of Care Reviewed with: patient    Subjective     Pain/Comfort:  Pain Rating 1: 0/10  Pain Rating Post-Intervention 1: 0/10    Objective:     Communicated with: PT/nurse prior to session.  Patient found HOB elevated with bed alarm, gonzales catheter, oxygen, peripheral IV, PICC line, telemetry(JIMBO telesitter) upon OT entry to room.    General Precautions: Standard, fall, aspiration, seizure   Orthopedic Precautions:N/A   Braces: N/A     Occupational Performance:     Bed Mobility:    · Patient completed Rolling/Turning to Left with  minimum assistance and with side rail  · Patient completed Rolling/Turning to Right with minimum assistance and with side rail  · Patient completed Scooting/Bridging with maximal assistance  and 2 persons  · Patient completed Supine to Sit with maximal assistance and with side rail  · Patient completed Sit to Supine with maximal assistance and with side rail     Activities of Daily Living:  · Pt. was incontinent of bowels in bed, she log rolled to both sides for cleanup with Dependent Assist x 2.      [facility] 6 Click ADL: 12    Treatment & Education:  Pt. sat 12 min EOB with c/o mild dizziness, initially sat with minimum assist then assisted with scooting to EOB (max assist x 2 ) and feet support on pillow, pt sat with SBA using BUE support on bed. She able to self correct trunk more to midline position ~75% of the time (leans to right side.)   Max vc for upright head and trunk extension posture 2/2 severe trunk and neck flexion. Pt. performed B shoulder isometric hold exercises @ 90-80 flexion x 3 reps x 3 sec hold and slow reversal with assistance.  Once pt back in bed, she did AROM ex 5 reps shld chest press, flexion/ext with mod vc to slow movements down and don't flop arms when lowering arms to bed. Pt verbalized understanding, she did 5 reps Biceps curls then said she was tired and could not do anymore .      Patient left HOB elevated with all lines intact, call button in reach, bed alarm on and nurse notifiedEducation:      GOALS:   Multidisciplinary Problems     Occupational Therapy Goals        Problem: Occupational Therapy Goal    Goal Priority Disciplines Outcome Interventions   Occupational Therapy Goal     OT, PT/OT Ongoing, Progressing    Description:  Goals to be met by: 12/1     Patient will increase functional independence with ADLs by performing:  MET 11/1/19 Pt will maintain gaze x 30 secs  MET 11/1/19 Pt will follow 1 step commands 25% of the time  Pt's family will be indep w/PROM    New Goals set 11/4/2019  Pt will complete AROM shoulder flex/ext through 75% of available AROM.  Pt will complete simple grooming task min A seated with HOB elevated.                            Time  Tracking:     OT Date of Treatment: 11/07/19  OT Start Time: 1350  OT Stop Time: 1434  OT Total Time (min): 44 min  COTX with PT    Billable Minutes:Therapeutic Activity 15  Therapeutic Exercise 10  Total Time 25    Stephanie Conroy OT  11/7/2019

## 2019-11-07 NOTE — TELEPHONE ENCOUNTER
----- Message from Ambreen Bernabe sent at 2019  2:27 PM CST -----  Contact: Annetta - [phone]  CONSULTS:     Patient: KURT APONTE [[id number]]    : 39    Clinic#: [id number]    Room number: 460    Referring MD: Dr. Espinosa     Diagnosis:  hoarseness     Person calling: Annetta

## 2019-11-07 NOTE — PLAN OF CARE
11/07/19 0956   Post-Acute Status   Post-Acute Authorization Placement  (snf)   Post-Acute Placement Status Pending State Direction  (the sw called the pt's Locet in to [employer] and faxed the PASRR to [facility]. )

## 2019-11-07 NOTE — ASSESSMENT & PLAN NOTE
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      Plan:  continue abx; per ID stop date for IV cefazolin/metronidazole 11/15/19 (metro switched to po)  Nephrology will continue to follow on floor for need of dialysis; gonzales in place for now for urinary retention-has good UOP  PT/OT  Continue regular diet, boost, suplena   OOBTC/sit on edge of bed with PT as able

## 2019-11-07 NOTE — PLAN OF CARE
I spoke with pt's daughter, Gabrielle, and she says that [facility] is out touring facilities at this time and they will have 3 preferences available tomorrow.  I gave Gabrielle a my card as well.  Family encouraged to call with any questions or concerns.  Cm will continue to follow pt through transitions of care and assist with any discharge needs.     11/07/19 1523   Post-Acute Status   Post-Acute Authorization Placement  (SNF)     Phil Vallejo RN,   [phone]

## 2019-11-07 NOTE — PROGRESS NOTES
[facility] Medicine  Progress Note    Patient Name: Annette Garcia  MRN: [id number]  Patient Class: IP- Inpatient   Admission Date: 10/27/2019  Length of Stay: 9 days  Attending Physician: Ryan Espinosa MD  Primary Care Provider: Jose Valles MD        Subjective:     Principal Problem:Colon necrosis        HPI:  Annette Garcia is an 84 year old white woman with peripheral vascular disease, renovascular hypertension, renal artery stenosis status post left renal artery stent placement on 7/19/17, coronary artery disease with history of myocardial infarction, diffuse large B cell lymphoma, anemia, epilepsy, hypothyroidism, chronic hyponatremia, depression, lumbar and sacroiliac joint osteoarthritis with chronic low back pain and history of lumbar spine surgery in 2013, slow transit constipation, history of appendectomy, history of cholecystectomy history hysterectomy, history of small bowel obstruction status post small bowel resection on 8/23/16. She lives in [place], [place]. Her son has epilepsy. Her daughter has tuberous sclerosis. Her primary care physician is Dr. Jose Torres. Her pain management specialist is Dr. Chirag Bates. Her neurologist is Dr. Gamal Lock.    She presented to [facility] on 10/27/19 with abdominal pain, difficulty urinating, and constipation for one day. She took polyethylene glycol and senna-docusate without relief. She strained during her last attempt at having a bowel movement. She had one episode of vomiting on the day of presentation. She felt weak when walking. In the emergency department, she was found to have acute kidney injury (BUN 27, creatinine 1.6, from 9 and 0.7 on 8/29/19), elevated transaminases ( and , from 16 and 11 on 8/29/19), and lactic acidosis (5.8). Contrast CT showed acute enteritis, sigmoid and rectal constipation, and some peritoneal free fluid in the pericolic  magdy.she was given lactated ringers, cefepime, and metronidazole. She required norepinephrine for septic shock. She was admitted to [facility].    Overview/Hospital Course:  Blood cultures grew pan-sensitive Escherichia coli. Due to history of bowel resection for obstruction, General Surgery was consulted for her bowel obstruction. She was put on amikacin and cefepime. 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 She was kept intubated postoperatively. Hospital Medicine changed amikacin to metronidazole, then later stopped metronidazole and changed cefepime to meropenem. She was given total parenteral nutrition. She became oliguric but responded well to a dose of furosemide, so Nephrology gave her doses of bumetanide. She developed thrombocytopenia. She developed atrial fibrillation and sinus pauses, so Cardiology was consulted and placed a temporary transvenous pacemaker. Echocardiogram only showed concentric remodeling and mild mitral regurgitation. Norepinephrine was changed to dopamine. EEG showed triphasic waves bilaterally consistent with metabolic encephalopathy. The epileptologist recommended treating her hyponatremia. She required pRBC transfusion. Nephrology improved her hyponatremia, and encephalopathy resolved. She was weaned off vasopressors and was extubated the morning of 11/2/19. Surgery stopped TPN and started full liquid diet on 11/3/19. Ertapenem was changed to cefazolin but Infectious Disease restarted metronidazole to continue empiric anaerobe coverage. Cardiology removed her temporary pacemaker on 11/4/19.     Interval History: Doing better today. Urinated 2.1 liters over the last day. Ate most of her breakfast this AM. Worked with therapy and sat on the edge of the bed for a bit yesterday.     Review of Systems   Constitutional: Negative for chills and fever.   Respiratory: Positive for shortness  of breath. Negative for cough.    Cardiovascular: Negative for chest pain and palpitations.   Gastrointestinal: Negative for nausea and vomiting.     Objective:     Vital Signs (Most Recent):  Temp: 98 °F (36.7 °C) (11/06/19 1957)  Pulse: 83 (11/06/19 1957)  Resp: 18 (11/06/19 1957)  BP: 133/62 (11/06/19 1957)  SpO2: 96 % (11/06/19 1957) Vital Signs (24h Range):  Temp:  [98 °F (36.7 °C)-99 °F (37.2 °C)] 98 °F (36.7 °C)  Pulse:  [76-87] 83  Resp:  [18-26] 18  SpO2:  [96 %-100 %] 96 %  BP: (133-167)/(61-97) 133/62     Weight: 70.6 kg (155 lb 10.3 oz)  Body mass index is 30.4 kg/m².    Intake/Output Summary (Last 24 hours) at 11/6/2019 2006  Last data filed at 11/6/2019 1851  Gross per 24 hour   Intake 520 ml   Output 2650 ml   Net -2130 ml      Physical Exam   Constitutional: She is oriented to person, place, and time. She appears well-developed and well-nourished. No distress. Nasal cannula in place.   Cardiovascular: Normal rate and regular rhythm.   No murmur heard.  Pulmonary/Chest: Effort normal and breath sounds normal. No respiratory distress. She has no wheezes.   Abdominal: Soft. Bowel sounds are normal. She exhibits no distension. There is no tenderness.   Musculoskeletal: She exhibits edema.   Neurological: She is alert and oriented to person, place, and time.   Skin: Ecchymosis noted.   Psychiatric: She has a normal mood and affect. Her behavior is normal.   Nursing note and vitals reviewed.      Significant Labs:   CMP:   Recent Labs   Lab 11/05/19  0357 11/05/19  0801 11/05/19  1703 11/06/19  0245    138 142 140   K 3.7 3.8 5.0 3.5    105 109 106   CO2 20* 21* 19* 22*    98 117* 112*   BUN 72* 73* 79* 71*   CREATININE 2.5* 2.5* 2.6* 2.4*   CALCIUM 6.6* 6.7* 6.4* 6.5*   PROT 4.3*  --   --  4.6*   ALBUMIN 2.0* 2.0* 1.9* 2.1*   BILITOT 0.4  --   --  0.4   ALKPHOS 123  --   --  112   AST 53*  --   --  42*   ALT 17  --   --  8*   ANIONGAP 13 12 14 12   EGFRNONAA 17* 17* 17* 18*        Significant Imaging: I have reviewed all pertinent imaging results/findings within the past 24 hours.      Assessment/Plan:      * Colon necrosis  E. coli bacteremia  Acute tubular necrosis  DIC (disseminated intravascular coagulation)  Giving cefazolin and metronidazole per ID until 11/15/16. Status post sigmoidectomy. CRRT held yesterday per Nephrology. Creatinine decreased to 2.4 and UOP over 2.1 liters. Overall improving. Appreciate General Surgery, Pulmonology, Cardiology, Nephrology, Hematology-Oncology, and Infectious Disease.     Anemia of acute infection  Transfused pRBCs.    Slow transit constipation  Takes polyethylene glycol at home.    Peripheral vascular disease, unspecified  Hyperlipidemia   Takes pravastatin.    Old MI (myocardial infarction)  Takes pravastatin.    Depression  Continue home mirtazapine 7.5mg HS.    Anemia due to antineoplastic chemotherapy  Thrombocytopenia  Stable.  Continue to monitor.     Chronic hyponatremia  Nephrology helping to manage acute worsening. Currently in the normal range.    DLBCL (diffuse large B cell lymphoma)  Follow up outpatient.    Acquired hypothyroidism  Continue home levothyroxine 125 mcg before breakfast.  Lab Results   Component Value Date    TSH 0.507 11/01/2019       Epilepsy  Continue home oxycarbazepine 300 mg nightly, phenobarbital 64.8 mg nightly.    Renovascular hypertension  Holding home lisinopril, clonidine, carvedilol.  SBP ranged 91 to 164.   Continue to monitor off her home medications.       VTE Risk Mitigation (From admission, onward)         Ordered     heparin (porcine) 1,000 unit/mL injection      11/04/19 1534     heparin (porcine) injection 5,000 Units  Every 8 hours      11/02/19 1447     heparin, porcine (PF) 100 unit/mL injection flush 1,000 Units  As needed (PRN)      11/02/19 2049     IP VTE LOW RISK PATIENT  Once      10/30/19 0811     Place sequential compression device  Until discontinued      10/28/19 0607                       Ryan Espinosa MD  Department of Hospital Medicine   [facility]

## 2019-11-07 NOTE — PROGRESS NOTES
[facility]  Infectious Disease  Progress Note    Patient Name: Annette Garcia  MRN: [id number]  Admission Date: 10/27/2019  Length of Stay: 10 days  Attending Physician: Ryan Espinosa MD  Primary Care Provider: Jose Valles MD    Isolation Status: No active isolations  Assessment/Plan:      E coli bacteremia  Ms. Garcia is an 83 yo female with E. Coli sepsis 10/28 s/p sigmoid colectomy for bowel necrosis on 10/29/19. Repeat BC negative 11/1/19.  She has multiple allergies to antibiotics; vanco discontinued 11/2; ertapenem discontinued 11/3; cefazolin started 11/3 and flagyl started 11/3.     Rec:  Watch abdominal exam carefully  Recommend 2 weeks antibiotics past 11/1 (which was first negative BC date) - so stop date should be around 11/15.   Continue cefazolin IVPB for now; metronidazole changed to PO 11/6  Will need cbc and cmp at least once a week while on antibiotics.   Patient tolerating cefazolin for now (has keflex listed as allergy on chart)          Anticipated Disposition: unclear at this point    Thank you for your consult. Dr. Edmonds will  [facility] ID Consult service tomorrow [phone]    Ingrid Marte MD  Infectious Disease  [facility]    Subjective:     Principal Problem:Colon necrosis    HPI: Annette Garcia is an 84 year old white woman with peripheral vascular disease, renovascular hypertension, renal artery stenosis status post left renal artery stent placement on 7/19/17, coronary artery disease with history of myocardial infarction, diffuse large B cell lymphoma, anemia, epilepsy, hypothyroidism, chronic hyponatremia, depression, lumbar and sacroiliac joint osteoarthritis with chronic low back pain and history of lumbar spine surgery in 2013, slow transit constipation, history of appendectomy, history of cholecystectomy history hysterectomy, history of small bowel obstruction status post small bowel resection on 8/23/16.  She presented to  the hospital with abdominal pain, difficulty urinating and constipation x 1 day.  In the ED, she was found to be in septic shock and required pressors.  CT performed there showed constipation and peritoneal free fluid.  Blood cultures growing pan-sensitive E. Coli.  Patient was taken to the OR on 10/29 and underwent sigmoid colectomy with end colostomy for findings of sigmoid necrosis and large amounts of turbid fluid in abdomen.  Patient was kept intubated following surgery.  She was also found to by hyponatremic with altered mental status.  Patient remains intubated and on pressor support in ICU.  History is obtained by chart review.  She is currently being treated with ertapenem.   ID consult called on 11/1 for help with antibiotics.   11/2 extubated; stop vancomycin  11/3 stopped ertapenem and changed to cefazolin and flagyl  11/4 in and out cath done for urinary retention over night; off pressors; on crrt  11/5 ate food today; no pressors; crrt continues  11/6 doing well on room air, eating OK; transferred to floor  11/7 on floor, no complaints, HD catheter removed today       Interval History: Patient with no complaints today - on floor - did well over night. Today the trialysis catheter was removed. Martinez still in place due to urinary retention. ENT consulted today for hoarseness. Case management talking to family about SNF.     Review of Systems   Respiratory: Negative for shortness of breath.    Gastrointestinal: Negative for diarrhea, nausea and vomiting.     Antibiotics (From admission, onward)    Start     Stop Route Frequency Ordered    11/06/19 1700  metroNIDAZOLE tablet 500 mg      -- Oral Every 8 hours 11/06/19 1614    11/03/19 1045  ceFAZolin (ANCEF) 1 gram in dextrose 5 % 50 mL IVPB (premix)      -- IV Every 12 hours (non-standard times) 11/03/19 0931        Objective:     Vital Signs (Most Recent):  Temp: 98.3 °F (36.8 °C) (11/07/19 1511)  Pulse: 90 (11/07/19 1600)  Resp: 16 (11/07/19 1131)  BP:  (!) 151/68 (11/07/19 1511)  SpO2: 95 % (11/07/19 0845) Vital Signs (24h Range):  Temp:  [98 °F (36.7 °C)-98.9 °F (37.2 °C)] 98.3 °F (36.8 °C)  Pulse:  [75-91] 90  Resp:  [16-18] 16  SpO2:  [95 %-99 %] 95 %  BP: (125-151)/(58-68) 151/68     Weight: 68.1 kg (150 lb 2.1 oz)  Body mass index is 29.32 kg/m².    Estimated Creatinine Clearance: 19.6 mL/min (A) (based on SCr of 1.9 mg/dL (H)).    Physical Exam   Cardiovascular: Normal heart sounds.   No murmur heard.  Pulmonary/Chest: Breath sounds normal. No respiratory distress.   Abdominal: Bowel sounds are normal. She exhibits no distension. There is no tenderness.   Has colostomy bag in place, midline abdominal wound healing well   Musculoskeletal: She exhibits no edema.   Neurological: She is alert.       Significant Labs:   Blood Culture:   Recent Labs   Lab 10/28/19  0049 11/01/19  1335 11/01/19  1516   LABBLOO Gram stain aer bottle: Gram negative rods   Results called to and read back by: Kinga Bateman RN  10/28/2019  19:01  ESCHERICHIA COLI* No growth after 5 days. No growth after 5 days.     CBC:   Recent Labs   Lab 11/07/19  0541   WBC 8.75   HGB 7.0*   HCT 22.0*        CMP:   Recent Labs   Lab 11/05/19  1703 11/06/19  0245 11/07/19  0541    140 142   K 5.0 3.5 3.4*    106 106   CO2 19* 22* 25   * 112* 100   BUN 79* 71* 55*   CREATININE 2.6* 2.4* 1.9*   CALCIUM 6.4* 6.5* 6.6*   PROT  --  4.6* 4.7*   ALBUMIN 1.9* 2.1* 2.2*   BILITOT  --  0.4 0.4   ALKPHOS  --  112 100   AST  --  42* 40   ALT  --  8* 5*   ANIONGAP 14 12 11   EGFRNONAA 17* 18* 24*     Urine Studies:   Recent Labs   Lab 10/28/19  1724 11/04/19  0412   COLORU Orange* Yellow   APPEARANCEUA Clear Clear   PHUR 5.0 6.0   SPECGRAV 1.025 1.010   PROTEINUA 2+* Negative   GLUCUA Negative Negative   KETONESU 1+* Negative   BILIRUBINUA 2+* Negative   OCCULTUA Trace* 1+*   NITRITE Positive* Negative   UROBILINOGEN 2.0-3.0* Negative   LEUKOCYTESUR Negative Negative   RBCUA 10* 2    WBCUA 5  --    BACTERIA Many*  --    SQUAMEPITHEL 2  --    HYALINECASTS 0  --      Significant Imaging: no new images since 11/1

## 2019-11-07 NOTE — SUBJECTIVE & OBJECTIVE
Interval History: Doing better today. Urinated 2.1 liters over the last day. Ate most of her breakfast this AM. Worked with therapy and sat on the edge of the bed for a bit yesterday.     Review of Systems   Constitutional: Negative for chills and fever.   Respiratory: Positive for shortness of breath. Negative for cough.    Cardiovascular: Negative for chest pain and palpitations.   Gastrointestinal: Negative for nausea and vomiting.     Objective:     Vital Signs (Most Recent):  Temp: 98 °F (36.7 °C) (11/06/19 1957)  Pulse: 83 (11/06/19 1957)  Resp: 18 (11/06/19 1957)  BP: 133/62 (11/06/19 1957)  SpO2: 96 % (11/06/19 1957) Vital Signs (24h Range):  Temp:  [98 °F (36.7 °C)-99 °F (37.2 °C)] 98 °F (36.7 °C)  Pulse:  [76-87] 83  Resp:  [18-26] 18  SpO2:  [96 %-100 %] 96 %  BP: (133-167)/(61-97) 133/62     Weight: 70.6 kg (155 lb 10.3 oz)  Body mass index is 30.4 kg/m².    Intake/Output Summary (Last 24 hours) at 11/6/2019 2006  Last data filed at 11/6/2019 1851  Gross per 24 hour   Intake 520 ml   Output 2650 ml   Net -2130 ml      Physical Exam   Constitutional: She is oriented to person, place, and time. She appears well-developed and well-nourished. No distress. Nasal cannula in place.   Cardiovascular: Normal rate and regular rhythm.   No murmur heard.  Pulmonary/Chest: Effort normal and breath sounds normal. No respiratory distress. She has no wheezes.   Abdominal: Soft. Bowel sounds are normal. She exhibits no distension. There is no tenderness.   Musculoskeletal: She exhibits edema.   Neurological: She is alert and oriented to person, place, and time.   Skin: Ecchymosis noted.   Psychiatric: She has a normal mood and affect. Her behavior is normal.   Nursing note and vitals reviewed.      Significant Labs:   CMP:   Recent Labs   Lab 11/05/19  0357 11/05/19  0801 11/05/19  1703 11/06/19  0245    138 142 140   K 3.7 3.8 5.0 3.5    105 109 106   CO2 20* 21* 19* 22*    98 117* 112*   BUN 72* 73*  79* 71*   CREATININE 2.5* 2.5* 2.6* 2.4*   CALCIUM 6.6* 6.7* 6.4* 6.5*   PROT 4.3*  --   --  4.6*   ALBUMIN 2.0* 2.0* 1.9* 2.1*   BILITOT 0.4  --   --  0.4   ALKPHOS 123  --   --  112   AST 53*  --   --  42*   ALT 17  --   --  8*   ANIONGAP 13 12 14 12   EGFRNONAA 17* 17* 17* 18*       Significant Imaging: I have reviewed all pertinent imaging results/findings within the past 24 hours.

## 2019-11-07 NOTE — PROGRESS NOTES
Progress Note  Nephrology      Consult Requested By: Ryan Espinosa MD  Reason for Consult: ARF    SUBJECTIVE:     Review of Systems   Constitutional: Negative for chills and fever.   Respiratory: Negative for cough and shortness of breath.    Cardiovascular: Negative for chest pain and leg swelling.   Gastrointestinal: Negative for nausea.     Patient Active Problem List   Diagnosis    Renovascular hypertension    Epilepsy    Acquired hypothyroidism    Osteoarthritis of lumbar spine    Degenerative joint disease of sacroiliac joint    S/P exploratory laparotomy    DLBCL (diffuse large B cell lymphoma)    Chronic hyponatremia    Anemia due to antineoplastic chemotherapy    Bilateral renal artery stenosis    Closed comminuted fracture of right humerus    Closed displaced fracture of distal phalanx of right little finger    Age-related osteoporosis with current pathological fracture with routine healing    Closed wedge compression fracture of eleventh thoracic vertebra with routine healing    DDD (degenerative disc disease), lumbar    Chronic bilateral low back pain without sciatica    Age-related osteoporosis without current pathological fracture    Vitamin D deficiency    Uncontrolled hypertension    Subjective memory complaints    Depression    Old MI (myocardial infarction)    Peripheral vascular disease, unspecified    History of stent insertion of left renal artery 7/19/17    Colon necrosis    Slow transit constipation    E coli bacteremia    Abdominal pain    Anemia of acute infection    Enteritis    Acute urinary retention    JAS (acute kidney injury)       OBJECTIVE:     Medications:   ceFAZolin (ANCEF) IVPB  1 g Intravenous Q12H    epoetin moe-ebpx (RETACRIT) injection  20,000 Units Subcutaneous Every Tues, Thurs, Sat    heparin (porcine)  5,000 Units Subcutaneous Q8H    iron sucrose  100 mg Intravenous Every Mon, Wed, Fri    levothyroxine  125 mcg Oral Before  breakfast    lidocaine HCL 2%   Topical (Top) Once    magnesium sulfate  2 g Intravenous Once    metroNIDAZOLE  500 mg Oral Q8H    OXcarbazepine  300 mg Oral QHS    PHENobarbital  64.8 mg Oral Nightly    pravastatin  20 mg Oral Daily       Vitals:    11/07/19 1600   BP:    Pulse: 90   Resp:    Temp:      I/O last 3 completed shifts:  In: 820 [P.O.:470; IV Piggyback:350]  Out: 3835 [Urine:3550; Stool:285]  Physical Exam   Constitutional: She is oriented to person, place, and time. She appears well-developed and well-nourished. No distress.   HENT:   Head: Normocephalic and atraumatic.   Eyes: EOM are normal. No scleral icterus.   Neck: Neck supple. No JVD present.   Cardiovascular: Normal rate and regular rhythm.   No murmur heard.  Pulmonary/Chest: Effort normal and breath sounds normal. No respiratory distress. She has no wheezes. She has no rales.   Abdominal: Soft. Bowel sounds are normal. She exhibits no distension. There is no tenderness.   Musculoskeletal: She exhibits no edema.   Neurological: She is alert and oriented to person, place, and time.   Skin: Skin is warm and dry. No erythema. No pallor.   Psychiatric: She has a normal mood and affect. Judgment normal.     Laboratory:  Recent Labs   Lab 11/03/19  0349 11/04/19  0339 11/04/19  1525 11/07/19  0541   WBC 9.69 10.66 12.26 8.75   HGB 8.0* 7.0* 7.1* 7.0*   HCT 23.9* 21.4* 22.0* 22.0*   PLT 59* 78* 77* 176   MONO 0.0*  CANCELED  --  6.6  0.8  --      Recent Labs   Lab 11/05/19  0801 11/05/19  1703 11/06/19  0245 11/07/19  0541    142 140 142   K 3.8 5.0 3.5 3.4*    109 106 106   CO2 21* 19* 22* 25   BUN 73* 79* 71* 55*   CREATININE 2.5* 2.6* 2.4* 1.9*   CALCIUM 6.7* 6.4* 6.5* 6.6*   PHOS 5.7* 5.8*  --  4.1     Labs reviewed  Diagnostic Results:  X-Ray: Reviewed  US: Reviewed  Echo: Reviewed      ASSESSMENT/PLAN:   ARF - ATN from septic shock from E. Coli bacteremia + s/p sigmoid colectomy for bowel necrosis    Pt with urinary  retention,  Now with gonzales UO 2.4L per day Cr improving, acidosis and hyperkalemia resolved, HD cath  Was removed earlier today     Avoid nephrotoxins, dose all meds ad for GFr < 30  No indication for urgent RRT. Monitor closely. Strict ins/outs, daily weight     Hypkalemia/hypomagnesemia - replacement in progress  Severe hypoalbuminemia -advanced diet, NEpro with meals + boost  Anemia of acute infection, h/o diffuse large B cell lymphoma SP R-CHOP- CRRT stable, transfuse only if Hb<7  Appreciate hem/onc help    Thank you for allowing me to participate in the care of your patients  With any question please call [phone]  Fatemeh Crandall    [facility]  MARTA Duckworth MD, FACJESSICA REID MD,   MD RAMONE Bourgeois, NP  [address]  [place], [id number]  [phone]

## 2019-11-07 NOTE — PLAN OF CARE
Recommendation:   1. Change diet to low residue.   2. Encourage intake at meals as tolerated.     Goals:   Pt will consume at least 50-75% intake at meals  Nutrition Goal Status: new  Communication of RD Recs: reviewed with RN(Carola)

## 2019-11-07 NOTE — SUBJECTIVE & OBJECTIVE
Interval History: No acute events overnight. VSS, AF. Breathing comfortably on RA. Stepped down out of ICU onto floor-no issues. No abdominal pain. Notes she is very hungry this am. Martinez still in place secondary to urinary retention. Feels like she has more energy today/less weak.     Medications:  Continuous Infusions:    Scheduled Meds:   ceFAZolin (ANCEF) IVPB  1 g Intravenous Q12H    epoetin moe-ebpx (RETACRIT) injection  20,000 Units Subcutaneous Every Tues, Thurs, Sat    heparin (porcine)  5,000 Units Subcutaneous Q8H    iron sucrose  100 mg Intravenous Every Mon, Wed, Fri    levothyroxine  125 mcg Oral Before breakfast    metroNIDAZOLE  500 mg Oral Q8H    OXcarbazepine  300 mg Oral QHS    PHENobarbital  64.8 mg Oral Nightly    pravastatin  20 mg Oral Daily     PRN Meds:acetaminophen, albuterol-ipratropium, heparin, porcine (PF), morphine, ondansetron, promethazine (PHENERGAN) IVPB, ramelteon, sodium chloride 0.9%     Review of patient's allergies indicates:   Allergen Reactions    Adhesive Itching and Blisters    Penicillins Anaphylaxis    Tramadol Hives    Avelox [moxifloxacin] Rash     Facial and arm itching and redness. Pt states throat closes when given.    Amoxil [amoxicillin]     Aspridrox [aspirin, buffered]     Codeine Other (See Comments)     Throat swelling    Keflex [cephalexin]      Tolerated cefepime    Norvasc [amlodipine]     Red dye Hives    Robitussin [guaifenesin]     Sulfa (sulfonamide antibiotics)     Tylenol [acetaminophen]      Has reaction to Tylenol with red dye and unable to take Extra Strength Tylenol/ CAN ONLY TOLERATE REG STRENGTH TYLENOL    Vicks vaporub [camphor-eucalyptus oil-menthol]      Objective:     Vital Signs (Most Recent):  Temp: 98.1 °F (36.7 °C) (11/07/19 0013)  Pulse: 75 (11/07/19 0406)  Resp: 18 (11/07/19 0013)  BP: (!) 125/58 (11/07/19 0013)  SpO2: 95 % (11/07/19 0013) Vital Signs (24h Range):  Temp:  [98 °F (36.7 °C)-98.6 °F (37 °C)] 98.1  °F (36.7 °C)  Pulse:  [75-87] 75  Resp:  [18-25] 18  SpO2:  [95 %-100 %] 95 %  BP: (125-167)/(58-75) 125/58     Weight: 70.6 kg (155 lb 10.3 oz)  Body mass index is 30.4 kg/m².    Intake/Output - Last 3 Shifts       11/05 0700 - 11/06 0659 11/06 0700 - 11/07 0659    P.O. 380 220    IV Piggyback 400 150    Total Intake(mL/kg) 780 (11) 370 (5.2)    Urine (mL/kg/hr) 2170 (1.3) 1550 (0.9)    Stool 285 0    Total Output 2455 1550    Net -1675 -1180          Stool Occurrence  1 x          Physical Exam   Constitutional: She appears well-developed. She appears lethargic. No distress.   Cardiovascular: Normal rate and regular rhythm.   Abdominal: Soft. There is no guarding.   Ostomy with stool/gas in bag. Ostomy mildly edematous, dusky in appearance   Musculoskeletal: She exhibits edema. She exhibits no tenderness.   Neurological: She appears lethargic.   Nursing note and vitals reviewed.      Significant Labs:  CBC:   Recent Labs   Lab 11/04/19  1525   WBC 12.26   RBC 2.18*   HGB 7.1*   HCT 22.0*   PLT 77*   *   MCH 32.6*   MCHC 32.3     CMP:   Recent Labs   Lab 11/06/19  0245   *   CALCIUM 6.5*   ALBUMIN 2.1*   PROT 4.6*      K 3.5   CO2 22*      BUN 71*   CREATININE 2.4*   ALKPHOS 112   ALT 8*   AST 42*   BILITOT 0.4         Significant Diagnostics:  I have reviewed all pertinent imaging results/findings within the past 24 hours.

## 2019-11-07 NOTE — PLAN OF CARE
I spoke with pt and her son, Ernesto [phone], Ernesto's wife and pt's niece at pt's bedside.  We discussed SNF placement and pt has been to [facility] in the past.  The pt states she does not want to go back to [facility].  I provided SNF list to them and they are discussing placement options at this time.  I requested 3 choices and they state they will give [place] choices later today once they discuss together.       11/07/19 1001   Post-Acute Status   Post-Acute Authorization Placement  (SNF)     Phil Vallejo RN,   [phone]

## 2019-11-07 NOTE — PLAN OF CARE
Ernesto, pt's son, asked me for a [place] SNF list because his sister lives in [place].  I provided the list.  He states they will give me choices tomorrow morning after they have a family discussion.     11/07/19 1101   Post-Acute Status   Post-Acute Authorization Placement  (SNF)     Phil Vallejo RN,   [phone]

## 2019-11-07 NOTE — SUBJECTIVE & OBJECTIVE
Interval History: Patient with no complaints today - on floor - did well over night. Today the trialysis catheter was removed. Martinez still in place due to urinary retention. ENT consulted today for hoarseness. Case management talking to family about SNF.     Review of Systems   Respiratory: Negative for shortness of breath.    Gastrointestinal: Negative for diarrhea, nausea and vomiting.     Antibiotics (From admission, onward)    Start     Stop Route Frequency Ordered    11/06/19 1700  metroNIDAZOLE tablet 500 mg      -- Oral Every 8 hours 11/06/19 1614    11/03/19 1045  ceFAZolin (ANCEF) 1 gram in dextrose 5 % 50 mL IVPB (premix)      -- IV Every 12 hours (non-standard times) 11/03/19 0939        Objective:     Vital Signs (Most Recent):  Temp: 98.3 °F (36.8 °C) (11/07/19 1511)  Pulse: 90 (11/07/19 1600)  Resp: 16 (11/07/19 1131)  BP: (!) 151/68 (11/07/19 1511)  SpO2: 95 % (11/07/19 0845) Vital Signs (24h Range):  Temp:  [98 °F (36.7 °C)-98.9 °F (37.2 °C)] 98.3 °F (36.8 °C)  Pulse:  [75-91] 90  Resp:  [16-18] 16  SpO2:  [95 %-99 %] 95 %  BP: (125-151)/(58-68) 151/68     Weight: 68.1 kg (150 lb 2.1 oz)  Body mass index is 29.32 kg/m².    Estimated Creatinine Clearance: 19.6 mL/min (A) (based on SCr of 1.9 mg/dL (H)).    Physical Exam   Cardiovascular: Normal heart sounds.   No murmur heard.  Pulmonary/Chest: Breath sounds normal. No respiratory distress.   Abdominal: Bowel sounds are normal. She exhibits no distension. There is no tenderness.   Has colostomy bag in place, midline abdominal wound healing well   Musculoskeletal: She exhibits no edema.   Neurological: She is alert.       Significant Labs:   Blood Culture:   Recent Labs   Lab 10/28/19  0049 11/01/19  1335 11/01/19  1516   LABBLOO Gram stain aer bottle: Gram negative rods   Results called to and read back by: Kinga Bateman RN  10/28/2019  19:01  ESCHERICHIA COLI* No growth after 5 days. No growth after 5 days.     CBC:   Recent Labs   Lab  11/07/19  0541   WBC 8.75   HGB 7.0*   HCT 22.0*        CMP:   Recent Labs   Lab 11/05/19  1703 11/06/19  0245 11/07/19  0541    140 142   K 5.0 3.5 3.4*    106 106   CO2 19* 22* 25   * 112* 100   BUN 79* 71* 55*   CREATININE 2.6* 2.4* 1.9*   CALCIUM 6.4* 6.5* 6.6*   PROT  --  4.6* 4.7*   ALBUMIN 1.9* 2.1* 2.2*   BILITOT  --  0.4 0.4   ALKPHOS  --  112 100   AST  --  42* 40   ALT  --  8* 5*   ANIONGAP 14 12 11   EGFRNONAA 17* 18* 24*     Urine Studies:   Recent Labs   Lab 10/28/19  1724 11/04/19  0412   COLORU Orange* Yellow   APPEARANCEUA Clear Clear   PHUR 5.0 6.0   SPECGRAV 1.025 1.010   PROTEINUA 2+* Negative   GLUCUA Negative Negative   KETONESU 1+* Negative   BILIRUBINUA 2+* Negative   OCCULTUA Trace* 1+*   NITRITE Positive* Negative   UROBILINOGEN 2.0-3.0* Negative   LEUKOCYTESUR Negative Negative   RBCUA 10* 2   WBCUA 5  --    BACTERIA Many*  --    SQUAMEPITHEL 2  --    HYALINECASTS 0  --      Significant Imaging: no new images since 11/1

## 2019-11-07 NOTE — PROGRESS NOTES
[facility]  Adult Nutrition  Progress Note    SUMMARY       Recommendations    Recommendation:   1. Change diet to low residue.   2. Encourage intake at meals as tolerated.     Goals:   Pt will consume at least 50-75% intake at meals  Nutrition Goal Status: new  Communication of RD Recs: reviewed with RN(Carola)    Reason for Assessment  Reason For Assessment: RD follow-up  Diagnosis: (colon necrosis)  Relevant Medical History: HTN, petit mal epilepsy, scoliosis of lumbar spine, seozures, colon CA, cholecystectomy, appendectomy, sigmpidectomy  General Information Comments: Pt extubated 11/2. Now on Regular diet with Novasource Renal & Boost. Pt with fair intake at meals. +colostomy. NFPE completed 10/31-mild wasting of temples & clavicles  Nutrition Discharge Planning: pt to d/c on low residue diet    Nutrition Risk Screen  Nutrition Risk Screen: no indicators present    Nutrition/Diet History  Food Preferences: unable to assess  Spiritual, Cultural Beliefs, Buddhism Practices, Values that Affect Care: no  Factors Affecting Nutritional Intake: altered gastrointestinal function    Anthropometrics  Temp: 98.3 °F (36.8 °C)  Height: 5' (152.4 cm)  Height (inches): 60 in  Weight Method: Bed Scale  Weight: 68.1 kg (150 lb 2.1 oz)  Weight (lb): 150.13 lb  Ideal Body Weight (IBW), Female: 100 lb  % Ideal Body Weight, Female (lb): 154.76 lb  BMI (Calculated): 29.3  BMI Grade: 30 - 34.9- obesity - grade I     Lab/Procedures/Meds  Pertinent Labs Reviewed: reviewed  Pertinent Labs Comments: K 3.4L, BUN 55H, Crea 1.9H, Ca 6.6L, Alb 2.2L  Pertinent Medications Reviewed: reviewed  Pertinent Medications Comments: albumin, Lasix, dopamine    Estimated/Assessed Needs  Weight Used For Calorie Calculations: 68.1 kg (150 lb 2.1 oz)  Energy Calorie Requirements (kcal): 1702 (25 kcal/kg)  Energy Need Method: Kcal/kg  Protein Requirements: 68g (1.0g/kg)  Weight Used For Protein Calculations: 68.1 kg (150 lb 2.1  oz)  Estimated Fluid Requirement Method: RDA Method  RDA Method (mL): 1702     Nutrition Prescription Ordered  Current Diet Order: Regular  Oral Nutrition Supplement: Novasource Renal, Boost Plus    Evaluation of Received Nutrient/Fluid Intake  I/O: 670/2510  Energy Calories Required: not meeting needs  Protein Required: not meeting needs  Fluid Required: not meeting needs  Comments: LBM 11/7  % Intake of Estimated Energy Needs: 25 - 50 %  % Meal Intake: 25 - 50 %    Nutrition Risk  Level of Risk/Frequency of Follow-up: (2xweekly)     Assessment and Plan    * Colon necrosis  Contributing Nutrition Diagnosis  Altered GI Function    Related to (etiology):   S/p surgery    Signs and Symptoms (as evidenced by):   NPO/intubated    Interventions:  Parenteral nutrition    Nutrition Diagnosis Status:   Improving (on [place] diet)         Monitor and Evaluation  Food and Nutrient Intake: food and beverage intake  Food and Nutrient Adminstration: diet order  Physical Activity and Function: nutrition-related ADLs and IADLs  Anthropometric Measurements: weight  Biochemical Data, Medical Tests and Procedures: electrolyte and renal panel  Nutrition-Focused Physical Findings: overall appearance     Malnutrition Assessment  [place] Region (Muscle Loss): mild depletion  Clavicle Bone Region (Muscle Loss): mild depletion       Nutrition Follow-Up  RD Follow-up?: Yes

## 2019-11-07 NOTE — ASSESSMENT & PLAN NOTE
Ms. Garcia is an 85 yo female with E. Coli sepsis 10/28 s/p sigmoid colectomy for bowel necrosis on 10/29/19. Repeat BC negative 11/1/19.  She has multiple allergies to antibiotics; vanco discontinued 11/2; ertapenem discontinued 11/3; cefazolin started 11/3 and flagyl started 11/3.     Rec:  Watch abdominal exam carefully  Recommend 2 weeks antibiotics past 11/1 (which was first negative BC date) - so stop date should be around 11/15.   Continue cefazolin IVPB for now; metronidazole changed to PO 11/6  Will need cbc and cmp at least once a week while on antibiotics.   Patient tolerating cefazolin for now (has keflex listed as allergy on chart)

## 2019-11-07 NOTE — PROGRESS NOTES
[facility]  General Surgery  Progress Note    Subjective:     History of Present Illness:  No notes on file    Post-Op Info:  Procedure(s) (LRB):  Insertion, Pacemaker, Temporary Transvenous (Right)   8 Days Post-Op     Interval History: No acute events overnight. VSS, AF. Breathing comfortably on RA. Stepped down out of ICU onto floor-no issues. No abdominal pain. Notes she is very hungry this am. Martinez still in place secondary to urinary retention. Feels like she has more energy today/less weak.     Medications:  Continuous Infusions:    Scheduled Meds:   ceFAZolin (ANCEF) IVPB  1 g Intravenous Q12H    epoetin moe-ebpx (RETACRIT) injection  20,000 Units Subcutaneous Every Tues, Thurs, Sat    heparin (porcine)  5,000 Units Subcutaneous Q8H    iron sucrose  100 mg Intravenous Every Mon, Wed, Fri    levothyroxine  125 mcg Oral Before breakfast    metroNIDAZOLE  500 mg Oral Q8H    OXcarbazepine  300 mg Oral QHS    PHENobarbital  64.8 mg Oral Nightly    pravastatin  20 mg Oral Daily     PRN Meds:acetaminophen, albuterol-ipratropium, heparin, porcine (PF), morphine, ondansetron, promethazine (PHENERGAN) IVPB, ramelteon, sodium chloride 0.9%     Review of patient's allergies indicates:   Allergen Reactions    Adhesive Itching and Blisters    Penicillins Anaphylaxis    Tramadol Hives    Avelox [moxifloxacin] Rash     Facial and arm itching and redness. Pt states throat closes when given.    Amoxil [amoxicillin]     Aspridrox [aspirin, buffered]     Codeine Other (See Comments)     Throat swelling    Keflex [cephalexin]      Tolerated cefepime    Norvasc [amlodipine]     Red dye Hives    Robitussin [guaifenesin]     Sulfa (sulfonamide antibiotics)     Tylenol [acetaminophen]      Has reaction to Tylenol with red dye and unable to take Extra Strength Tylenol/ CAN ONLY TOLERATE REG STRENGTH TYLENOL    Vicks vaporub [camphor-eucalyptus oil-menthol]      Objective:     Vital Signs  (Most Recent):  Temp: 98.1 °F (36.7 °C) (11/07/19 0013)  Pulse: 75 (11/07/19 0406)  Resp: 18 (11/07/19 0013)  BP: (!) 125/58 (11/07/19 0013)  SpO2: 95 % (11/07/19 0013) Vital Signs (24h Range):  Temp:  [98 °F (36.7 °C)-98.6 °F (37 °C)] 98.1 °F (36.7 °C)  Pulse:  [75-87] 75  Resp:  [18-25] 18  SpO2:  [95 %-100 %] 95 %  BP: (125-167)/(58-75) 125/58     Weight: 70.6 kg (155 lb 10.3 oz)  Body mass index is 30.4 kg/m².    Intake/Output - Last 3 Shifts       11/05 0700 - 11/06 0659 11/06 0700 - 11/07 0659    P.O. 380 220    IV Piggyback 400 150    Total Intake(mL/kg) 780 (11) 370 (5.2)    Urine (mL/kg/hr) 2170 (1.3) 1550 (0.9)    Stool 285 0    Total Output 2455 1550    Net -1675 -1180          Stool Occurrence  1 x          Physical Exam   Constitutional: She appears well-developed. She appears lethargic. No distress.   Cardiovascular: Normal rate and regular rhythm.   Abdominal: Soft. There is no guarding.   Ostomy with stool/gas in bag. Ostomy mildly edematous, dusky in appearance   Musculoskeletal: She exhibits edema. She exhibits no tenderness.   Neurological: She appears lethargic.   Nursing note and vitals reviewed.      Significant Labs:  CBC:   Recent Labs   Lab 11/04/19  1525   WBC 12.26   RBC 2.18*   HGB 7.1*   HCT 22.0*   PLT 77*   *   MCH 32.6*   MCHC 32.3     CMP:   Recent Labs   Lab 11/06/19  0245   *   CALCIUM 6.5*   ALBUMIN 2.1*   PROT 4.6*      K 3.5   CO2 22*      BUN 71*   CREATININE 2.4*   ALKPHOS 112   ALT 8*   AST 42*   BILITOT 0.4         Significant Diagnostics:  I have reviewed all pertinent imaging results/findings within the past 24 hours.    Assessment/Plan:     Abdominal pain  84-year-old female s/p sigmoid colectomy with end colostomy 10/30/19.      Plan:  continue abx; per ID stop date for IV cefazolin/metronidazole 11/15/19 (metro switched to po)  Nephrology will continue to follow on floor for need of dialysis; gonzales in place for now for urinary retention-has  good UOP  PT/OT  Continue regular diet, boost, suplena   OOBTC/sit on edge of bed with PT as able          Selam Hanson MD  General Surgery  [facility]

## 2019-11-07 NOTE — PLAN OF CARE
Plan of care reviewed with patient. Normal sinus rhythm on continuous heart monitor, no true red alarms. Safety maintained at this time. Bed in lowest position, side rails up x 2. Call light in reach.  Encouraged patient to use call light for assistance .Verbalized understanding, colostomy bad  to left lower quadrant  C/D/I draining loose brown stool. Urethral catheter in place draining clear yellow urine. Patient receiving IV antibiotics.No adverse reaction noted.  Safety maintained. Will continue to monitor patient.

## 2019-11-07 NOTE — ASSESSMENT & PLAN NOTE
Holding home lisinopril, clonidine, carvedilol.  SBP ranged 91 to 164.   Continue to monitor off her home medications.

## 2019-11-07 NOTE — ASSESSMENT & PLAN NOTE
E. coli bacteremia  Acute tubular necrosis  DIC (disseminated intravascular coagulation)  Giving cefazolin and metronidazole per ID until 11/15/16. Status post sigmoidectomy. CRRT held yesterday per Nephrology. Creatinine decreased to 2.4 and UOP over 2.1 liters. Overall improving. Appreciate General Surgery, Pulmonology, Cardiology, Nephrology, Hematology-Oncology, and Infectious Disease.

## 2019-11-07 NOTE — PROGRESS NOTES
VN cued into the patient's room with permission. The patient was awake ans alert with family at the bedside. She was only able to nod yes or no to communicate due to her having laryngitis at this time. She denies any pain. Education was provided on colostomy care. The patient did not have any questions or concerns at this time. Will continue to monitor.

## 2019-11-07 NOTE — PROGRESS NOTES
Progress Note  Nephrology      Consult Requested By: Ryan Espinosa MD  Reason for Consult: ARF    SUBJECTIVE:     Review of Systems   Constitutional: Negative for chills and fever.   Respiratory: Negative for cough and shortness of breath.    Cardiovascular: Negative for chest pain and leg swelling.   Gastrointestinal: Negative for nausea.     Patient Active Problem List   Diagnosis    Renovascular hypertension    Epilepsy    Acquired hypothyroidism    Osteoarthritis of lumbar spine    Degenerative joint disease of sacroiliac joint    S/P exploratory laparotomy    DLBCL (diffuse large B cell lymphoma)    Chronic hyponatremia    Anemia due to antineoplastic chemotherapy    Bilateral renal artery stenosis    Closed comminuted fracture of right humerus    Closed displaced fracture of distal phalanx of right little finger    Age-related osteoporosis with current pathological fracture with routine healing    Closed wedge compression fracture of eleventh thoracic vertebra with routine healing    DDD (degenerative disc disease), lumbar    Chronic bilateral low back pain without sciatica    Age-related osteoporosis without current pathological fracture    Vitamin D deficiency    Uncontrolled hypertension    Subjective memory complaints    Depression    Old MI (myocardial infarction)    Peripheral vascular disease, unspecified    History of stent insertion of left renal artery 7/19/17    Colon necrosis    Slow transit constipation    E coli bacteremia    Abdominal pain    DIC (disseminated intravascular coagulation)    Anemia of acute infection    Thrombocytopenia    Enteritis    Acute urinary retention    JAS (acute kidney injury)       OBJECTIVE:     Medications:   ceFAZolin (ANCEF) IVPB  1 g Intravenous Q12H    epoetin moe-ebpx (RETACRIT) injection  20,000 Units Subcutaneous Every Tues, Thurs, Sat    heparin (porcine)  5,000 Units Subcutaneous Q8H    iron sucrose  100 mg  Intravenous Every Mon, Wed, Fri    levothyroxine  125 mcg Oral Before breakfast    metroNIDAZOLE  500 mg Oral Q8H    OXcarbazepine  300 mg Oral QHS    PHENobarbital  64.8 mg Oral Nightly    pravastatin  20 mg Oral Daily       Vitals:    11/06/19 1645   BP: (!) 150/67   Pulse: 81   Resp: 18   Temp: 98.6 °F (37 °C)     I/O last 3 completed shifts:  In: 1230 [P.O.:630; Other:50; IV Piggyback:550]  Out: 3155 [Urine:2870; Stool:285]  Physical Exam   Constitutional: She is oriented to person, place, and time. She appears well-developed and well-nourished. No distress.   HENT:   Head: Normocephalic and atraumatic.   Eyes: EOM are normal. No scleral icterus.   Neck: Neck supple. No JVD present.   Cardiovascular: Normal rate and regular rhythm.   No murmur heard.  Pulmonary/Chest: Effort normal and breath sounds normal. No respiratory distress. She has no wheezes. She has no rales.   Abdominal: Soft. Bowel sounds are normal. She exhibits no distension. There is no tenderness.   Musculoskeletal: She exhibits no edema.   Neurological: She is alert and oriented to person, place, and time.   Skin: Skin is warm and dry. No erythema. No pallor.   Psychiatric: She has a normal mood and affect. Judgment normal.     Laboratory:  Recent Labs   Lab 11/03/19  0349 11/04/19  0339 11/04/19  1525   WBC 9.69 10.66 12.26   HGB 8.0* 7.0* 7.1*   HCT 23.9* 21.4* 22.0*   PLT 59* 78* 77*   MONO 0.0*  CANCELED  --  6.6  0.8     Recent Labs   Lab 11/04/19  0339  11/05/19  0801 11/05/19  1703 11/06/19  0245      < > 138 142 140   K 3.8   < > 3.8 5.0 3.5      < > 105 109 106   CO2 22*   < > 21* 19* 22*   BUN 81*   < > 73* 79* 71*   CREATININE 2.7*   < > 2.5* 2.6* 2.4*   CALCIUM 7.0*   < > 6.7* 6.4* 6.5*   PHOS 3.3  --  5.7* 5.8*  --     < > = values in this interval not displayed.     Labs reviewed  Diagnostic Results:  X-Ray: Reviewed  US: Reviewed  Echo: Reviewed      ASSESSMENT/PLAN:   ARF - ATN from septic shock from E. Coli  bacteremia + s/p sigmoid colectomy for bowel necrosis    Pt with urinary retention,  Now with gonzales UO 2.1L per day     Avoid nephrotoxins, dose all meds ad for GFr < 10  No indication for urgent RRT. Monitor closely. Strict ins/outs, daily weight      Severe hypoalbuminemia -advanced diet, NEpro with meals + boost  Anemia of acute infection, h/o diffuse large B cell lymphoma SP R-CHOP- CRRT stable, transfuse only if Hb<7  Appreciate hem/onc help    Thank you for allowing me to participate in the care of your patients  With any question please call [phone]  Fatemeh Crandall    [facility]  MARTA Duckworth MD, FACJEANETTE,   JESSICA Vale MD,   MD RAMONE Bourgeois, NP  [address]  [place], [address] [phone]

## 2019-11-07 NOTE — PLAN OF CARE
Problem: Physical Therapy Goal  Goal: Physical Therapy Goal  Description  1.  Roll bilaterally with supervision  2.  Supine to/from sit with supervision  3.  Bed to/from chair with CG  4.  Ambulate 50' with RW with CG  5.  Sit in chair 2 hours   Outcome: Ongoing, Progressing   Patient progressing toward goals.

## 2019-11-08 LAB
ALBUMIN SERPL BCP-MCNC: 2.2 G/DL (ref 3.5–5.2)
ALP SERPL-CCNC: 87 U/L (ref 55–135)
ALT SERPL W/O P-5'-P-CCNC: 5 U/L (ref 10–44)
ANION GAP SERPL CALC-SCNC: 8 MMOL/L (ref 8–16)
AST SERPL-CCNC: 39 U/L (ref 10–40)
BILIRUB SERPL-MCNC: 0.3 MG/DL (ref 0.1–1)
BUN SERPL-MCNC: 36 MG/DL (ref 8–23)
CALCIUM SERPL-MCNC: 6.6 MG/DL (ref 8.7–10.5)
CHLORIDE SERPL-SCNC: 109 MMOL/L (ref 95–110)
CO2 SERPL-SCNC: 24 MMOL/L (ref 23–29)
CREAT SERPL-MCNC: 1.4 MG/DL (ref 0.5–1.4)
ERYTHROCYTE [DISTWIDTH] IN BLOOD BY AUTOMATED COUNT: 15.8 % (ref 11.5–14.5)
EST. GFR  (AFRICAN AMERICAN): 40 ML/MIN/1.73 M^2
EST. GFR  (NON AFRICAN AMERICAN): 35 ML/MIN/1.73 M^2
GLUCOSE SERPL-MCNC: 116 MG/DL (ref 70–110)
HCT VFR BLD AUTO: 22 % (ref 37–48.5)
HGB BLD-MCNC: 7 G/DL (ref 12–16)
MAGNESIUM SERPL-MCNC: 1.4 MG/DL (ref 1.6–2.6)
MCH RBC QN AUTO: 32.7 PG (ref 27–31)
MCHC RBC AUTO-ENTMCNC: 31.8 G/DL (ref 32–36)
MCV RBC AUTO: 103 FL (ref 82–98)
PHOSPHATE SERPL-MCNC: 2.9 MG/DL (ref 2.7–4.5)
PLATELET # BLD AUTO: 200 K/UL (ref 150–350)
PMV BLD AUTO: 10.8 FL (ref 9.2–12.9)
POTASSIUM SERPL-SCNC: 3.6 MMOL/L (ref 3.5–5.1)
PROT SERPL-MCNC: 4.7 G/DL (ref 6–8.4)
RBC # BLD AUTO: 2.14 M/UL (ref 4–5.4)
SODIUM SERPL-SCNC: 141 MMOL/L (ref 136–145)
WBC # BLD AUTO: 8.08 K/UL (ref 3.9–12.7)

## 2019-11-08 PROCEDURE — 99233 SBSQ HOSP IP/OBS HIGH 50: CPT | Mod: ,,, | Performed by: OTOLARYNGOLOGY

## 2019-11-08 PROCEDURE — 36415 COLL VENOUS BLD VENIPUNCTURE: CPT

## 2019-11-08 PROCEDURE — 25000003 PHARM REV CODE 250: Performed by: HOSPITALIST

## 2019-11-08 PROCEDURE — 99233 PR SUBSEQUENT HOSPITAL CARE,LEVL III: ICD-10-PCS | Mod: ,,, | Performed by: OTOLARYNGOLOGY

## 2019-11-08 PROCEDURE — 63600175 PHARM REV CODE 636 W HCPCS: Performed by: HOSPITALIST

## 2019-11-08 PROCEDURE — 97530 THERAPEUTIC ACTIVITIES: CPT

## 2019-11-08 PROCEDURE — 25000003 PHARM REV CODE 250: Performed by: OTOLARYNGOLOGY

## 2019-11-08 PROCEDURE — 85027 COMPLETE CBC AUTOMATED: CPT

## 2019-11-08 PROCEDURE — 25000003 PHARM REV CODE 250: Performed by: INTERNAL MEDICINE

## 2019-11-08 PROCEDURE — 80053 COMPREHEN METABOLIC PANEL: CPT

## 2019-11-08 PROCEDURE — 11000001 HC ACUTE MED/SURG PRIVATE ROOM

## 2019-11-08 PROCEDURE — 83735 ASSAY OF MAGNESIUM: CPT

## 2019-11-08 PROCEDURE — 84100 ASSAY OF PHOSPHORUS: CPT

## 2019-11-08 RX ORDER — LORAZEPAM/0.9% SODIUM CHLORIDE 100MG/0.1L
2 PLASTIC BAG, INJECTION (ML) INTRAVENOUS
Status: COMPLETED | OUTPATIENT
Start: 2019-11-08 | End: 2019-11-08

## 2019-11-08 RX ORDER — CEFAZOLIN SODIUM 2 G/50ML
2 SOLUTION INTRAVENOUS
Status: DISCONTINUED | OUTPATIENT
Start: 2019-11-08 | End: 2019-11-11

## 2019-11-08 RX ADMIN — CEFAZOLIN SODIUM 2 G: 2 SOLUTION INTRAVENOUS at 10:11

## 2019-11-08 RX ADMIN — LIDOCAINE HYDROCHLORIDE: 20 JELLY TOPICAL at 09:11

## 2019-11-08 RX ADMIN — METRONIDAZOLE 500 MG: 500 TABLET ORAL at 05:11

## 2019-11-08 RX ADMIN — MAGNESIUM SULFATE IN WATER 2 G: 40 INJECTION, SOLUTION INTRAVENOUS at 12:11

## 2019-11-08 RX ADMIN — HEPARIN SODIUM 5000 UNITS: 5000 INJECTION, SOLUTION INTRAVENOUS; SUBCUTANEOUS at 05:11

## 2019-11-08 RX ADMIN — LEVOTHYROXINE SODIUM 125 MCG: 25 TABLET ORAL at 05:11

## 2019-11-08 RX ADMIN — HEPARIN SODIUM 5000 UNITS: 5000 INJECTION, SOLUTION INTRAVENOUS; SUBCUTANEOUS at 03:11

## 2019-11-08 RX ADMIN — PRAVASTATIN SODIUM 20 MG: 20 TABLET ORAL at 08:11

## 2019-11-08 RX ADMIN — PHENOBARBITAL 64.8 MG: 32.4 TABLET ORAL at 09:11

## 2019-11-08 RX ADMIN — MAGNESIUM SULFATE IN WATER 2 G: 40 INJECTION, SOLUTION INTRAVENOUS at 10:11

## 2019-11-08 RX ADMIN — METRONIDAZOLE 500 MG: 500 TABLET ORAL at 09:11

## 2019-11-08 RX ADMIN — METRONIDAZOLE 500 MG: 500 TABLET ORAL at 03:11

## 2019-11-08 RX ADMIN — OXCARBAZEPINE 300 MG: 150 TABLET, FILM COATED ORAL at 09:11

## 2019-11-08 RX ADMIN — HEPARIN SODIUM 5000 UNITS: 5000 INJECTION, SOLUTION INTRAVENOUS; SUBCUTANEOUS at 09:11

## 2019-11-08 NOTE — PROGRESS NOTES
[facility]  Infectious Disease  Progress Note  Pt Name: Annette Garcia   Room: K460/K460 A  Admitted On: 10/27/2019  Today: 2019    Subjective:       Patient is feeling well today, still hoarse.  She it tolerating PO diet well.  She denies fever/chills, abdominal pain, nausea, vomiting.       Objective:   Last 24 Hour Vital Signs:  BP  Min: 143/66  Max: 171/74  Temp  Av.6 °F (37 °C)  Min: 97.7 °F (36.5 °C)  Max: 99.3 °F (37.4 °C)  Pulse  Av.6  Min: 82  Max: 93  Resp  Av.3  Min: 17  Max: 20  SpO2  Av.4 %  Min: 93 %  Max: 95 %  I/O last 3 completed shifts:  In: 922.5 [P.O.:680; I.V.:92.5; IV Piggyback:150]  Out: 3060 [Urine:3000; Stool:60]    Physical Examination:    Const: Well-appearing, in NAD  Cardio: RRR, no murmurs, 2+ peripheral pulses   Pulm: Lungs clear to auscultation bilaterally, normal work of breathing   Abdomen: Soft, mild tenderness to palpation, non-distended, normoactive bowel sounds.  Ostomy in place with stool output.  Midline incision healing well.  Extremities: No peripheral edema  Skin: Warm and dry, no rash   Neuro: Alert and oriented x 3. No focal deficits.      Laboratory:    Hematology:  Recent Labs   Lab 19  0349 19  0339 19  1525 19  0541 19  0622   WBC 9.69 10.66 12.26 8.75 8.08   HGB 8.0* 7.0* 7.1* 7.0* 7.0*   PLT 59* 78* 77* 176 200   MCV 96 99* 101* 103* 103*   RDW 14.7* 14.8* 14.9* 15.6* 15.8*       Chemistry:  Recent Labs   Lab 19  0349 19  1538 19  0339 19  0357 19  0801 19  1703 19  0245 19  0541 19  0622   *  135* 137 136 138 138 142 140 142 141   K 3.8  3.8 3.8 3.8 3.7 3.8 5.0 3.5 3.4* 3.6     103 105 104 105 105 109 106 106 109   CO2 23  23 22* 22* 20* 21* 19* 22* 25 24   BUN 66*  66* 76* 81* 72* 73* 79* 71* 55* 36*   CREATININE 2.0*  2.0* 2.3* 2.7* 2.5* 2.5* 2.6* 2.4* 1.9* 1.4   *  190* 135* 98 104 98 117* 112* 100 116*    CALCIUM 7.5*  7.5* 7.4* 7.0* 6.6* 6.7* 6.4* 6.5* 6.6* 6.6*   PROT 4.4*  --  4.2* 4.3*  --   --  4.6* 4.7* 4.7*   ALBUMIN 1.8*  1.8*  1.8*  --  1.8*  1.8* 2.0* 2.0* 1.9* 2.1* 2.2* 2.2*   ALT 23  --  29 17  --   --  8* 5* 5*   AST 50*  --  63* 53*  --   --  42* 40 39   ALKPHOS 117  --  133 123  --   --  112 100 87   MG 2.2 2.1 2.1  --   --   --   --  1.4* 1.4*   PHOS 2.5*  2.5* 2.7 3.3  --  5.7* 5.8*  --  4.1 2.9       DM:  Recent Labs   Lab 11/06/19  0245 11/07/19  0541 11/08/19  0622   * 100 116*         Microbiology:  Microbiology Results (last 7 days)     Procedure Component Value Units Date/Time    Blood culture [[id number]] Collected:  11/01/19 1516    Order Status:  Completed Specimen:  Blood Updated:  11/06/19 1822     Blood Culture, Routine No growth after 5 days.    Narrative:       Collection has been rescheduled by AC2 at 11/01/2019 14:23 Reason: pt   recieving unit  Collection has been rescheduled by AC2 at 11/01/2019 14:23 Reason: pt   recieving unit    Blood culture [[id number]] Collected:  11/01/19 1335    Order Status:  Completed Specimen:  Blood Updated:  11/06/19 1822     Blood Culture, Routine No growth after 5 days.    Blood culture [[id number]]     Order Status:  Canceled Specimen:  Blood     Blood culture [[id number]]     Order Status:  Canceled Specimen:  Blood           Antibiotics and Day Number of Therapy:  Antibiotics (From admission, onward)    Start     Stop Route Frequency Ordered    11/08/19 4019  cefazolin (ANCEF) 2 gram in dextrose 5% 50 mL IVPB (premix)      -- IV Every 12 hours (non-standard times) 11/08/19 1156    11/06/19 1700  metroNIDAZOLE tablet 500 mg      -- Oral Every 8 hours 11/06/19 1614            Assessment/Plan:     E coli bacteremia  Ms. Garcia is an 85 yo female with E. Coli sepsis 10/28 s/p sigmoid colectomy for bowel necrosis on 10/29/19. Repeat BC negative 11/1/19.  She has multiple allergies to antibiotics; vanco discontinued 11/2; ertapenem  discontinued 11/3; cefazolin started 11/3 and flagyl started 11/3.      Rec:  - Recommend 2 weeks antibiotics past 11/1 (which was first negative BC date) - so stop date should be around 11/15.   - Continue cefazolin IVPB for now; metronidazole changed to PO 11/6  - When ready for discharge, can transition to PO paramjitflex    Rob Junior MD  [facility]  Infectious Disease

## 2019-11-08 NOTE — PLAN OF CARE
Problem: Physical Therapy Goal  Goal: Physical Therapy Goal  Description  1.  Roll bilaterally with supervision  2.  Supine to/from sit with supervision  3.  Bed to/from chair with CG  4.  Ambulate 50' with RW with CG  5.  Sit in chair 2 hours   Outcome: Ongoing, Progressing   Patient progressed sit to stand from EOB using Kate Stedy; pt sat on Ivette Stedy x ~20 minutes; pt very tearful/labile, expressing her fear of activity, not being able to sleep for fear of seizures coming back 2/2 fatigue; cont to recommend SNF to maximize functional status.

## 2019-11-08 NOTE — PROGRESS NOTES
Progress Note  Nephrology      Consult Requested By: Ryan Espinosa MD  Reason for Consult: ARF    SUBJECTIVE:     Review of Systems   Constitutional: Negative for chills and fever.   Respiratory: Negative for cough and shortness of breath.    Cardiovascular: Negative for chest pain and leg swelling.   Gastrointestinal: Negative for nausea.     Patient Active Problem List   Diagnosis    Renovascular hypertension    Epilepsy    Acquired hypothyroidism    Osteoarthritis of lumbar spine    Degenerative joint disease of sacroiliac joint    S/P exploratory laparotomy    DLBCL (diffuse large B cell lymphoma)    Chronic hyponatremia    Anemia due to antineoplastic chemotherapy    Bilateral renal artery stenosis    Closed comminuted fracture of right humerus    Closed displaced fracture of distal phalanx of right little finger    Age-related osteoporosis with current pathological fracture with routine healing    Closed wedge compression fracture of eleventh thoracic vertebra with routine healing    DDD (degenerative disc disease), lumbar    Chronic bilateral low back pain without sciatica    Age-related osteoporosis without current pathological fracture    Vitamin D deficiency    Uncontrolled hypertension    Subjective memory complaints    Depression    Old MI (myocardial infarction)    Peripheral vascular disease, unspecified    History of stent insertion of left renal artery 7/19/17    Colon necrosis    Slow transit constipation    E coli bacteremia    Abdominal pain    Anemia of acute infection    Enteritis    Acute urinary retention    JAS (acute kidney injury)    Hoarseness       OBJECTIVE:     Medications:   ceFAZolin (ANCEF) IVPB  2 g Intravenous Q12H    epoetin moe-ebpx (RETACRIT) injection  20,000 Units Subcutaneous Every Tues, Thurs, Sat    heparin (porcine)  5,000 Units Subcutaneous Q8H    iron sucrose  100 mg Intravenous Every Mon, Wed, Fri    levothyroxine  125 mcg  Oral Before breakfast    metroNIDAZOLE  500 mg Oral Q8H    OXcarbazepine  300 mg Oral QHS    PHENobarbital  64.8 mg Oral Nightly    pravastatin  20 mg Oral Daily       Vitals:    11/08/19 1603   BP:    Pulse: 87   Resp:    Temp:      I/O last 3 completed shifts:  In: 922.5 [P.O.:680; I.V.:92.5; IV Piggyback:150]  Out: 3060 [Urine:3000; Stool:60]  Physical Exam   Constitutional: She is oriented to person, place, and time. She appears well-developed and well-nourished. No distress.   HENT:   Head: Normocephalic and atraumatic.   Eyes: EOM are normal. No scleral icterus.   Neck: Neck supple. No JVD present.   Cardiovascular: Normal rate and regular rhythm.   No murmur heard.  Pulmonary/Chest: Effort normal and breath sounds normal. No respiratory distress. She has no wheezes. She has no rales.   Abdominal: Soft. Bowel sounds are normal. She exhibits no distension. There is no tenderness.   Musculoskeletal: She exhibits no edema.   Neurological: She is alert and oriented to person, place, and time.   Skin: Skin is warm and dry. No erythema. No pallor.   Psychiatric: She has a normal mood and affect. Judgment normal.     Laboratory:  Recent Labs   Lab 11/03/19  0349  11/04/19  1525 11/07/19  0541 11/08/19  0622   WBC 9.69   < > 12.26 8.75 8.08   HGB 8.0*   < > 7.1* 7.0* 7.0*   HCT 23.9*   < > 22.0* 22.0* 22.0*   PLT 59*   < > 77* 176 200   MONO 0.0*  CANCELED  --  6.6  0.8  --   --     < > = values in this interval not displayed.     Recent Labs   Lab 11/05/19  1703 11/06/19  0245 11/07/19  0541 11/08/19  0622    140 142 141   K 5.0 3.5 3.4* 3.6    106 106 109   CO2 19* 22* 25 24   BUN 79* 71* 55* 36*   CREATININE 2.6* 2.4* 1.9* 1.4   CALCIUM 6.4* 6.5* 6.6* 6.6*   PHOS 5.8*  --  4.1 2.9     Labs reviewed  Diagnostic Results:  X-Ray: Reviewed  US: Reviewed  Echo: Reviewed      ASSESSMENT/PLAN:   ARF - ATN from septic shock from E. Coli bacteremia + s/p sigmoid colectomy for bowel necrosis    Pt with  urinary retention,  Now with gonzales with excellent UO Cr improving, acidosis and hyperkalemia resolved, HD cath  Was removed        Avoid nephrotoxins, dose all meds ad for GFr < 60  No indication for urgent RRT. Monitor closely. Strict ins/outs, daily weight      Severe hypoalbuminemia -advanced diet, NEpro with meals + boost  Anemia of acute infection, h/o diffuse large B cell lymphoma SP R-CHOP- CRRT stable, transfuse only if Hb<7  Appreciate hem/onc help    Will sign off  Pt is to follow up with urology for urinary retention work up/recomendations and f/u in my clinic 2-3 weeks post d/c  Thank you for allowing me to participate in the care of your patients  With any question please call [phone]  Fatemeh Crandall    [facility]  MARTA Duckworth MD, JESSICA HIDALGO MD,   MD RAMONE Bourgeois, NP  [address]  [place], [address]  [phone]

## 2019-11-08 NOTE — PROGRESS NOTES
Pharmacist Renal Dose Adjustment Note    Annette Garcia is a 84 y.o. female being treated with the medication cefazolin    Patient Data:    Vital Signs (Most Recent):  Temp: 98.8 °F (37.1 °C) (11/08/19 1015)  Pulse: 93 (11/08/19 1151)  Resp: 17 (11/08/19 0810)  BP: (!) 155/70 (11/08/19 1015)  SpO2: 95 % (11/08/19 0810)   Vital Signs (72h Range):  Temp:  [97.7 °F (36.5 °C)-99.3 °F (37.4 °C)]   Pulse:  [75-93]   Resp:  [16-26]   BP: (125-171)/(58-97)   SpO2:  [93 %-100 %]      Recent Labs   Lab 11/06/19  0245 11/07/19  0541 11/08/19  0622   CREATININE 2.4* 1.9* 1.4     Serum creatinine: 1.4 mg/dL 11/08/19 0622  Estimated creatinine clearance: 26.7 mL/min    Medication:cefazolin dose: 1gram frequency q12h will be changed to medication:cefazolin dose:2gram frequency:q12h    Pharmacist's Name: Lorne Allred  Pharmacist's Extension: [phone]

## 2019-11-08 NOTE — PROGRESS NOTES
[facility]  General Surgery  Progress Note    Subjective:     Interval History:   Feeling well, minimal pain  Ostomy working well  In good spirits, stood up with PT  Julianne diet but not eating much  Voice not improving  evaulated by ENT today at bedside, right sided vocal cord paralyzed    Post-Op Info:  Procedure(s) (LRB):  Insertion, Pacemaker, Temporary Transvenous (Right)   9 Days Post-Op      Medications:  Continuous Infusions:  Scheduled Meds:   ceFAZolin (ANCEF) IVPB  2 g Intravenous Q12H    epoetin moe-ebpx (RETACRIT) injection  20,000 Units Subcutaneous Every Tues, Thurs, Sat    heparin (porcine)  5,000 Units Subcutaneous Q8H    iron sucrose  100 mg Intravenous Every Mon, Wed, Fri    levothyroxine  125 mcg Oral Before breakfast    metroNIDAZOLE  500 mg Oral Q8H    OXcarbazepine  300 mg Oral QHS    PHENobarbital  64.8 mg Oral Nightly    pravastatin  20 mg Oral Daily     PRN Meds:acetaminophen, albuterol-ipratropium, heparin, porcine (PF), morphine, ondansetron, promethazine (PHENERGAN) IVPB, ramelteon, sodium chloride 0.9%     Objective:     Vital Signs (Most Recent):  Temp: 97.9 °F (36.6 °C) (11/08/19 1708)  Pulse: 87 (11/08/19 1708)  Resp: 19 (11/08/19 1708)  BP: (!) 142/65 (11/08/19 1708)  SpO2: 96 % (11/08/19 1708) Vital Signs (24h Range):  Temp:  [97.7 °F (36.5 °C)-99.3 °F (37.4 °C)] 97.9 °F (36.6 °C)  Pulse:  [82-93] 87  Resp:  [17-20] 19  SpO2:  [93 %-96 %] 96 %  BP: (142-171)/(65-92) 142/65       Intake/Output Summary (Last 24 hours) at 11/8/2019 1724  Last data filed at 11/8/2019 0908  Gross per 24 hour   Intake 417.5 ml   Output 2710 ml   Net -2292.5 ml       Physical Exam   Constitutional: She is oriented to person, place, and time.   Cardiovascular: Normal rate.   Pulmonary/Chest: Effort normal. No respiratory distress.   Abdominal: Soft. There is no tenderness.   Ostomy with good output   Neurological: She is alert and oriented to person, place, and time.        Significant Labs:  CBC:   Recent Labs   Lab 11/08/19  0622   WBC 8.08   RBC 2.14*   HGB 7.0*   HCT 22.0*      *   MCH 32.7*   MCHC 31.8*     CMP:   Recent Labs   Lab 11/08/19  0622   *   CALCIUM 6.6*   ALBUMIN 2.2*   PROT 4.7*      K 3.6   CO2 24      BUN 36*   CREATININE 1.4   ALKPHOS 87   ALT 5*   AST 39   BILITOT 0.3       Significant Diagnostics:  I have reviewed all pertinent imaging results/findings within the past 24 hours.    Assessment/Plan:     Active Diagnoses:    Diagnosis Date Noted POA    PRINCIPAL PROBLEM:  Colon necrosis [K55.049] 10/27/2019 Yes    Hoarseness [R49.0] 11/07/2019 No    JAS (acute kidney injury) [N17.9] 11/06/2019 Yes    Acute urinary retention [R33.8] 11/04/2019 Yes    Anemia of acute infection [D64.9] 11/01/2019 No    Enteritis [K52.9]  Yes    E coli bacteremia [R78.81] 10/29/2019 Yes    Abdominal pain [R10.9] 10/29/2019 Yes    Old MI (myocardial infarction) [I25.2] 10/28/2019 Not Applicable     Chronic    Peripheral vascular disease, unspecified [I73.9] 10/28/2019 Yes     Chronic    Slow transit constipation [K59.01] 10/28/2019 Yes     Chronic    Depression [F32.9] 10/17/2019 Yes    History of stent insertion of left renal artery 7/19/17 [Z98.890] 07/19/2017 Not Applicable     Chronic    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, T45.1X5A] 12/27/2016 Yes     Chronic    Chronic hyponatremia [E87.1] 10/12/2016 Yes     Chronic    DLBCL (diffuse large B cell lymphoma) [C83.30] 09/09/2016 Yes     Chronic    Acquired hypothyroidism [E03.9] 08/18/2016 Yes     Chronic    Renovascular hypertension [I15.0] 08/18/2016 Yes     Chronic    Epilepsy [G40.909] 08/18/2016 Yes     Chronic      Problems Resolved During this Admission:    Diagnosis Date Noted Date Resolved POA    Thrombocytopenia [D69.6] 11/01/2019 11/07/2019 No    Lactic acidosis [E87.2]  11/02/2019 Yes    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 coli septic shock [A41.51, R65.21] 10/28/2019 11/02/2019 Yes    Abnormal liver enzymes [R74.8] 10/28/2019 11/02/2019 Yes    Fecal obstruction [K56.41]  11/02/2019 Yes     84F s/p sigmoid resection with end colostomy  continue abx; per ID stop date for IV cefazolin/metronidazole 11/15/19 (metro switched to po)  PT/OT  Continue regular diet, boost, suplena, encourage PT  Fu ENT - planning CT of neck      Chris Johnson MD  General Surgery  [facility]

## 2019-11-08 NOTE — PLAN OF CARE
11/08/19 1109   Post-Acute Status   Post-Acute Authorization Placement  (snf)   Post-Acute Placement Status Pending Post-Acute Medical Review  ([facility])   Discharge Delays (!) Other  (Pt's not ready for d/c until Monday orTuesday. Pt has a renal  panel pending.)   The Sw spoke to Ashley at [facility] and he does have a bed today but doesn't know if they will have one next week. She states they are still reviewing the pt. She will f/u with the pt's son Ernesto next week if they have a bed available next week if the pt meets criteria.

## 2019-11-08 NOTE — PROGRESS NOTES
[facility] Hospital Medicine  Progress Note    Patient Name: Annette Garcia  MRN: [id number]  Patient Class: IP- Inpatient   Admission Date: 10/27/2019  Length of Stay: 10 days  Attending Physician: Ryan Espinosa MD  Primary Care Provider: Jose Valles MD        Subjective:     Principal Problem:Colon necrosis        HPI:  Annette Garcia is an 84 year old white woman with peripheral vascular disease, renovascular hypertension, renal artery stenosis status post left renal artery stent placement on 7/19/17, coronary artery disease with history of myocardial infarction, diffuse large B cell lymphoma, anemia, epilepsy, hypothyroidism, chronic hyponatremia, depression, lumbar and sacroiliac joint osteoarthritis with chronic low back pain and history of lumbar spine surgery in 2013, slow transit constipation, history of appendectomy, history of cholecystectomy history hysterectomy, history of small bowel obstruction status post small bowel resection on 8/23/16. She lives in [place], [place]. Her son has epilepsy. Her daughter has tuberous sclerosis. Her primary care physician is Dr. Jose Torres. Her pain management specialist is Dr. Chirag Bates. Her neurologist is Dr. Gamal Lock.    She presented to [facility] on 10/27/19 with abdominal pain, difficulty urinating, and constipation for one day. She took polyethylene glycol and senna-docusate without relief. She strained during her last attempt at having a bowel movement. She had one episode of vomiting on the day of presentation. She felt weak when walking. In the emergency department, she was found to have acute kidney injury (BUN 27, creatinine 1.6, from 9 and 0.7 on 8/29/19), elevated transaminases ( and , from 16 and 11 on 8/29/19), and lactic acidosis (5.8). Contrast CT showed acute enteritis, sigmoid and rectal constipation, and some peritoneal free fluid in the pericolic  magdy.she was given lactated ringers, cefepime, and metronidazole. She required norepinephrine for septic shock. She was admitted to [facility].    Overview/Hospital Course:  Blood cultures grew pan-sensitive Escherichia coli. Due to history of bowel resection for obstruction, General Surgery was consulted for her bowel obstruction. She was put on amikacin and cefepime. 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 She was kept intubated postoperatively. Hospital Medicine changed amikacin to metronidazole, then later stopped metronidazole and changed cefepime to meropenem. She was given total parenteral nutrition. She became oliguric but responded well to a dose of furosemide, so Nephrology gave her doses of bumetanide. She developed thrombocytopenia. She developed atrial fibrillation and sinus pauses, so Cardiology was consulted and placed a temporary transvenous pacemaker. Echocardiogram only showed concentric remodeling and mild mitral regurgitation. Norepinephrine was changed to dopamine. EEG showed triphasic waves bilaterally consistent with metabolic encephalopathy. The epileptologist recommended treating her hyponatremia. She required pRBC transfusion. Nephrology improved her hyponatremia, and encephalopathy resolved. She was weaned off vasopressors and was extubated the morning of 11/2/19. Surgery stopped TPN and started full liquid diet on 11/3/19. Ertapenem was changed to cefazolin but Infectious Disease restarted metronidazole to continue empiric anaerobe coverage. Cardiology removed her temporary pacemaker on 11/4/19.     Interval History: Eating okay. Some mild abdominal discomfort still, but improving. Continues to urinate well. Still with hoarse voice.     Review of Systems   Constitutional: Negative for chills and fever.   Respiratory: Negative for cough and shortness of breath.    Cardiovascular: Negative  for chest pain and palpitations.   Gastrointestinal: Negative for nausea and vomiting.     Objective:     Vital Signs (Most Recent):  Temp: 99.3 °F (37.4 °C) (11/07/19 1941)  Pulse: 87 (11/07/19 2000)  Resp: 18 (11/07/19 1941)  BP: (!) 143/66 (11/07/19 1941)  SpO2: (!) 94 % (11/07/19 1941) Vital Signs (24h Range):  Temp:  [98.1 °F (36.7 °C)-99.3 °F (37.4 °C)] 99.3 °F (37.4 °C)  Pulse:  [75-91] 87  Resp:  [16-18] 18  SpO2:  [94 %-95 %] 94 %  BP: (125-151)/(58-68) 143/66     Weight: 68.1 kg (150 lb 2.1 oz)  Body mass index is 29.32 kg/m².    Intake/Output Summary (Last 24 hours) at 11/7/2019 2332  Last data filed at 11/7/2019 0900  Gross per 24 hour   Intake 430 ml   Output 1060 ml   Net -630 ml      Physical Exam   Constitutional: She is oriented to person, place, and time. She appears well-developed and well-nourished. No distress. Nasal cannula in place.   Cardiovascular: Normal rate and regular rhythm.   No murmur heard.  Pulmonary/Chest: Effort normal and breath sounds normal. No respiratory distress. She has no wheezes.   Abdominal: Soft. Bowel sounds are normal. She exhibits no distension. There is no tenderness.   Musculoskeletal: She exhibits edema.   Neurological: She is alert and oriented to person, place, and time.   Skin: Ecchymosis noted.   Psychiatric: She has a normal mood and affect. Her behavior is normal.   Nursing note and vitals reviewed.      Significant Labs:   CBC:   Recent Labs   Lab 11/07/19  0541   WBC 8.75   HGB 7.0*   HCT 22.0*        CMP:   Recent Labs   Lab 11/06/19  0245 11/07/19  0541    142   K 3.5 3.4*    106   CO2 22* 25   * 100   BUN 71* 55*   CREATININE 2.4* 1.9*   CALCIUM 6.5* 6.6*   PROT 4.6* 4.7*   ALBUMIN 2.1* 2.2*   BILITOT 0.4 0.4   ALKPHOS 112 100   AST 42* 40   ALT 8* 5*   ANIONGAP 12 11   EGFRNONAA 18* 24*     Magnesium:   Recent Labs   Lab 11/07/19  0541   MG 1.4*       Significant Imaging: I have reviewed all pertinent imaging results/findings  within the past 24 hours.      Assessment/Plan:      * Colon necrosis  E. coli bacteremia  Acute tubular necrosis  DIC (disseminated intravascular coagulation)  Giving cefazolin and metronidazole per ID until 11/15/16. Status post sigmoidectomy. RRT held per Nephrology. Creatinine decreased to 1.9 and UOP over 2.4 liters. Continues to have renal recovery. Will remove trialysis catheter. Appreciate General Surgery, Pulmonology, Cardiology, Nephrology, Hematology-Oncology, and Infectious Disease.     Hoarseness  Will consult ENT for evaluation as has had this hoarseness since extubation.       Anemia of acute infection  Transfused pRBCs.    Slow transit constipation  Takes polyethylene glycol at home.    Peripheral vascular disease, unspecified  Hyperlipidemia   Takes pravastatin.    Old MI (myocardial infarction)  Takes pravastatin.    Depression  Continue home mirtazapine 7.5mg HS.    Anemia due to antineoplastic chemotherapy  Thrombocytopenia  Stable.  Continue to monitor.     Chronic hyponatremia  Nephrology helping to manage acute worsening. Currently in the normal range.    DLBCL (diffuse large B cell lymphoma)  Follow up outpatient.    Acquired hypothyroidism  Continue home levothyroxine 125 mcg before breakfast.  Lab Results   Component Value Date    TSH 0.507 11/01/2019       Epilepsy  Continue home oxycarbazepine 300 mg nightly, phenobarbital 64.8 mg nightly.    Renovascular hypertension  Holding home lisinopril, clonidine, carvedilol.  SBP ranged 125 to 167   Continue to monitor off her home medications.       VTE Risk Mitigation (From admission, onward)         Ordered     heparin (porcine) 1,000 unit/mL injection      11/04/19 1534     heparin (porcine) injection 5,000 Units  Every 8 hours      11/02/19 1447     heparin, porcine (PF) 100 unit/mL injection flush 1,000 Units  As needed (PRN)      11/02/19 2049     IP VTE LOW RISK PATIENT  Once      10/30/19 0811     Place sequential compression device   Until discontinued      10/28/19 0607                      Ryan Espinosa MD  Department of Hospital Medicine   [facility]

## 2019-11-08 NOTE — PT/OT/SLP PROGRESS
Physical Therapy Treatment    Patient Name:  Annette Garcia   MRN:  [id number]    Recommendations:     Discharge Recommendations:  nursing facility, skilled   Discharge Equipment Recommendations: (TBD)   Barriers to discharge: Decreased caregiver support    Assessment:     Annette Garcia is a 84 y.o. female admitted with a medical diagnosis of Colon necrosis.  She presents with the following impairments/functional limitations:  weakness, impaired endurance, gait instability, impaired functional mobilty, impaired self care skills, impaired balance, decreased coordination, decreased upper extremity function, decreased lower extremity function, decreased safety awareness, decreased ROM, impaired skin Patient progressed sit to stand from EOB using Kate Stedy; pt sat on Ivette Stedy x ~20 minutes; pt very tearful/labile, expressing her fear of activity, not being able to sleep for fear of seizures coming back 2/2 fatigue; cont to recommend SNF to maximize functional status..    Rehab Prognosis: Fair; patient would benefit from acute skilled PT services to address these deficits and reach maximum level of function.    Recent Surgery: Procedure(s) (LRB):  Insertion, Pacemaker, Temporary Transvenous (Right) 9 Days Post-Op    Plan:     During this hospitalization, patient to be seen 6 x/week to address the identified rehab impairments via gait training, therapeutic activities, therapeutic exercises, neuromuscular re-education and progress toward the following goals:    · Plan of Care Expires:  12/01/19    Subjective     Pain/Comfort:  · Pain Rating 1: 0/10  · Pain Rating Post-Intervention 1: 0/10      Objective:     Communicated with nurse prior to session.  Patient found with bed alarm, central line, gonzales catheter, telemetry, pressure relief boots, colostomy upon PT entry to room.     General Precautions: Standard, fall, aspiration, seizure   Orthopedic Precautions:N/A   Braces: N/A     Functional Mobility:  · Bed  Mobility:     · Rolling Left:  maximal assistance and use of side rail  · Scooting: maximal assistance and to EOB; dep with drawsheet to HOB  · Supine to Sit: maximal assistance and of 2 persons  · Sit to Supine: maximal assistance and of 2 persons  · Transfers:     Sit to Stand:  maximal assistance, of 2 persons and use of Kate Stedy     AM-PAC 6 CLICK MOBILITY  Turning over in bed (including adjusting bedclothes, sheets and blankets)?: 2  Sitting down on and standing up from a chair with arms (e.g., wheelchair, bedside commode, etc.): 2  Moving from lying on back to sitting on the side of the bed?: 2  Moving to and from a bed to a chair (including a wheelchair)?: 1  Need to walk in hospital room?: 1  Climbing 3-5 steps with a railing?: 1  Basic Mobility Total Score: 9       Therapeutic Activities and Exercises:   Patient reports fear of overdoing her activity; demonstrated use of Kate Stedy; transitioned to EOB and sat while arranging Kate Stedy; used SS to stand then tolerated modified seated position on Kate Stedy~20 minutes; pt performed APs and FAQ then rested head on pillow 2/2 reports of nausea and began to crying 2/2 her situation; provided encouragement; ENT entered room; returned pt to bed and placed in chair position.    Patient left HOB elevated with all lines intact, call button in reach, bed alarm on and nurse notified..    GOALS:   Multidisciplinary Problems     Physical Therapy Goals        Problem: Physical Therapy Goal    Goal Priority Disciplines Outcome Goal Variances Interventions   Physical Therapy Goal     PT, PT/OT Ongoing, Progressing     Description:  1.  Roll bilaterally with supervision  2.  Supine to/from sit with supervision  3.  Bed to/from chair with CG  4.  Ambulate 50' with RW with CG  5.  Sit in chair 2 hours           Problem: Physical Therapy Goal    Goal Priority Disciplines Outcome Goal Variances Interventions   Physical Therapy Goal     PT, PT/OT Ongoing, Not Progressing      Description:  Goals to be met by:      Patient will increase functional independence with mobility by performin. Supine to sit with Moderate Assistance  2. Sit to supine with Moderate Assistance  3. Rolling to Left and Right with Moderate Assistance.  4. Sit to stand transfer with Moderate Assistance using RW  5. Bed to chair transfer with Minimal Assistance and Moderate Assistance using Rolling Walker  6. Gait  x 25 feet with Minimal Assistance and Moderate Assistance using Rolling Walker.   7. Lower extremity exercise program x10 with active BLE with assistance as needed                      Time Tracking:     PT Received On: 19  PT Start Time: 1128     PT Stop Time: 1202  PT Total Time (min): 34 min with OT    Billable Minutes: Therapeutic Activity  18 minutes    Treatment Type: Treatment  PT/PTA: PT     PTA Visit Number: 0     Kinga Velásquez, PT  2019

## 2019-11-08 NOTE — PLAN OF CARE
11/08/19 1119   Post-Acute Status   Post-Acute Authorization Placement  (snf)   Post-Acute Placement Status Pending Post-Acute Medical Review  (the pt has been denied by [facility] due to them not being able to meet her needs. )   Discharge Delays (!) Medication Delay (Cost, Insurance, Delivery)  (The pt has been medically accepted to [facility] which is her 3rd choice pending her Prolia can be changed to another medication less expensive but they're her 3rd choice. )

## 2019-11-08 NOTE — SUBJECTIVE & OBJECTIVE
Interval History: Doing okay today. Still not eating much. No nausea.     Review of Systems   Constitutional: Negative for chills and fever.   Respiratory: Positive for shortness of breath. Negative for cough.    Cardiovascular: Negative for chest pain and palpitations.   Gastrointestinal: Negative for nausea and vomiting.     Objective:     Vital Signs (Most Recent):  Temp: 97.9 °F (36.6 °C) (11/08/19 1708)  Pulse: 87 (11/08/19 1708)  Resp: 19 (11/08/19 1708)  BP: (!) 142/65 (11/08/19 1708)  SpO2: 96 % (11/08/19 1708) Vital Signs (24h Range):  Temp:  [97.7 °F (36.5 °C)-99.3 °F (37.4 °C)] 97.9 °F (36.6 °C)  Pulse:  [82-93] 87  Resp:  [17-20] 19  SpO2:  [93 %-96 %] 96 %  BP: (142-171)/(65-92) 142/65     Weight: 68.1 kg (150 lb 2.1 oz)  Body mass index is 29.32 kg/m².    Intake/Output Summary (Last 24 hours) at 11/8/2019 1710  Last data filed at 11/8/2019 0908  Gross per 24 hour   Intake 417.5 ml   Output 2710 ml   Net -2292.5 ml      Physical Exam   Constitutional: She is oriented to person, place, and time. She appears well-developed and well-nourished. No distress. Nasal cannula in place.   Cardiovascular: Normal rate and regular rhythm.   No murmur heard.  Pulmonary/Chest: Effort normal and breath sounds normal. No respiratory distress. She has no wheezes.   Abdominal: Soft. Bowel sounds are normal. She exhibits no distension. There is tenderness.   Musculoskeletal: She exhibits edema.   Neurological: She is alert and oriented to person, place, and time.   Skin: Ecchymosis noted.   Psychiatric: She has a normal mood and affect. Her behavior is normal.   Nursing note and vitals reviewed.      Significant Labs:   CBC:   Recent Labs   Lab 11/07/19  0541 11/08/19  0622   WBC 8.75 8.08   HGB 7.0* 7.0*   HCT 22.0* 22.0*    200     CMP:   Recent Labs   Lab 11/07/19  0541 11/08/19  0622    141   K 3.4* 3.6    109   CO2 25 24    116*   BUN 55* 36*   CREATININE 1.9* 1.4   CALCIUM 6.6* 6.6*   PROT  4.7* 4.7*   ALBUMIN 2.2* 2.2*   BILITOT 0.4 0.3   ALKPHOS 100 87   AST 40 39   ALT 5* 5*   ANIONGAP 11 8   EGFRNONAA 24* 35*     Magnesium:   Recent Labs   Lab 11/07/19  0541 11/08/19  0622   MG 1.4* 1.4*       Significant Imaging: I have reviewed all pertinent imaging results/findings within the past 24 hours.

## 2019-11-08 NOTE — PLAN OF CARE
11/08/19 1008   Post-Acute Status   Post-Acute Authorization Placement  (snf)   Post-Acute Placement Status Referrals Sent  (the sw faxed the pt's info to [facility] & [facility] snf's)

## 2019-11-08 NOTE — PROGRESS NOTES
Pt with HAPI prevention orders in place. Stage 1 noted to coccyx, see assessment below, basic wound care orders in place, Sizewise mattress overlay ordered at this time and date. Pt nurse notified. Z-flex boots placed on both feet.       11/07/19 2100        Pressure Injury 11/07/19 1300 Coccyx Stage 1   Date First Assessed/Time First Assessed: 11/07/19 1300   Pressure Injury Present on Admission: suspected hospital acquired  Location: Coccyx  Staging: Stage 1   Staging Stage 1   Dressing Appearance Dry;Intact;Clean   Drainage Amount None   Drainage Characteristics/Odor No odor   Appearance Red   Tissue loss description Not applicable   Red (%), Wound Tissue Color 100 %   Periwound Area Intact   Wound Edges Defined   Wound Length (cm) 2 cm   Wound Width (cm) 2 cm   Wound Depth (cm) 0 cm   Wound Volume (cm^3) 0 cm^3   Wound Surface Area (cm^2) 4 cm^2   Care Cleansed with:;Sterile normal saline   Dressing Foam

## 2019-11-08 NOTE — PLAN OF CARE
Problem: Occupational Therapy Goal  Goal: Occupational Therapy Goal  Description  Goals to be met by: 12/1     Patient will increase functional independence with ADLs by performing:  MET 11/1/19 Pt will maintain gaze x 30 secs  MET 11/1/19 Pt will follow 1 step commands 25% of the time  Pt's family will be indep w/PROM    New Goals set 11/4/2019  Pt will complete AROM shoulder flex/ext through 75% of available AROM.  Pt will complete simple grooming task min A seated with HOB elevated.           Outcome: Ongoing, Progressing     Patient tolerated modified sitting position using Kate Stedy x 20 mins /c fair tolerance. Patient labile and tearful -- reporting increased fear of activity. Patient also reporting lack of sleep and worry her seizures may come on 2/2 fatigue. Patient will benefit from continued skilled OT to address deficits and improve performance in functional ADL tasks. Cont OT.

## 2019-11-08 NOTE — PLAN OF CARE
I spoke with pt's son Ernesto and he gave me SNF preferences.  1. [facility]  2. [facility]  3. [facility]  4. [facility].       11/08/19 1010   Post-Acute Status   Post-Acute Authorization Placement  (SNF)     Phil Vallejo RN,   [phone]

## 2019-11-08 NOTE — PROGRESS NOTES
Brief Hematology/Oncology Note:     Labs have been reviewed.     Lab Results   Component Value Date    WBC 8.08 11/08/2019    HGB 7.0 (L) 11/08/2019    HCT 22.0 (L) 11/08/2019     (H) 11/08/2019     11/08/2019        1. Thrombocytopenia / anemia of acute infection.  - Labs have been reviewed.  - hemoglobin today is 7 g/dL. Thrombocytopenia has resolved. Platelet count is 200 k/uL.  - iron studies (10/31/19) revealed a decreased total iron binding capacity. This is consistent with anemia of acute infection/inflammation/hospitalization.       2. DLBCL (diffuse large B cell lymphoma)  - patient of Dr. Arredondo. She completed 6 cycles of R-CHOP; last dose in January 2017.  - pathology from surgery (10/29/19) was negative for lymphoma  - no signs of recurrent disease    Given the resolution of her thrombocytopenia, I will sign off. Please re-consult if you have further questions.     Charles Reeves M.D.  Hematology/Oncology  [facility] - [address], [address]  [place] [address]  Phone: [phone]  Fax: [phone]

## 2019-11-08 NOTE — ASSESSMENT & PLAN NOTE
Holding home lisinopril, clonidine, carvedilol.  SBP ranged 125 to 167   Continue to monitor off her home medications.

## 2019-11-08 NOTE — SUBJECTIVE & OBJECTIVE
Interval History: Eating okay. Some mild abdominal discomfort still, but improving. Continues to urinate well. Still with hoarse voice.     Review of Systems   Constitutional: Negative for chills and fever.   Respiratory: Negative for cough and shortness of breath.    Cardiovascular: Negative for chest pain and palpitations.   Gastrointestinal: Negative for nausea and vomiting.     Objective:     Vital Signs (Most Recent):  Temp: 99.3 °F (37.4 °C) (11/07/19 1941)  Pulse: 87 (11/07/19 2000)  Resp: 18 (11/07/19 1941)  BP: (!) 143/66 (11/07/19 1941)  SpO2: (!) 94 % (11/07/19 1941) Vital Signs (24h Range):  Temp:  [98.1 °F (36.7 °C)-99.3 °F (37.4 °C)] 99.3 °F (37.4 °C)  Pulse:  [75-91] 87  Resp:  [16-18] 18  SpO2:  [94 %-95 %] 94 %  BP: (125-151)/(58-68) 143/66     Weight: 68.1 kg (150 lb 2.1 oz)  Body mass index is 29.32 kg/m².    Intake/Output Summary (Last 24 hours) at 11/7/2019 2332  Last data filed at 11/7/2019 0900  Gross per 24 hour   Intake 430 ml   Output 1060 ml   Net -630 ml      Physical Exam   Constitutional: She is oriented to person, place, and time. She appears well-developed and well-nourished. No distress. Nasal cannula in place.   Cardiovascular: Normal rate and regular rhythm.   No murmur heard.  Pulmonary/Chest: Effort normal and breath sounds normal. No respiratory distress. She has no wheezes.   Abdominal: Soft. Bowel sounds are normal. She exhibits no distension. There is no tenderness.   Musculoskeletal: She exhibits edema.   Neurological: She is alert and oriented to person, place, and time.   Skin: Ecchymosis noted.   Psychiatric: She has a normal mood and affect. Her behavior is normal.   Nursing note and vitals reviewed.      Significant Labs:   CBC:   Recent Labs   Lab 11/07/19  0541   WBC 8.75   HGB 7.0*   HCT 22.0*        CMP:   Recent Labs   Lab 11/06/19  0245 11/07/19  0541    142   K 3.5 3.4*    106   CO2 22* 25   * 100   BUN 71* 55*   CREATININE 2.4* 1.9*    CALCIUM 6.5* 6.6*   PROT 4.6* 4.7*   ALBUMIN 2.1* 2.2*   BILITOT 0.4 0.4   ALKPHOS 112 100   AST 42* 40   ALT 8* 5*   ANIONGAP 12 11   EGFRNONAA 18* 24*     Magnesium:   Recent Labs   Lab 11/07/19  0541   MG 1.4*       Significant Imaging: I have reviewed all pertinent imaging results/findings within the past 24 hours.

## 2019-11-08 NOTE — PT/OT/SLP PROGRESS
Occupational Therapy   Treatment    Name: Annette Garcia  MRN: [id number]  Admitting Diagnosis:  Colon necrosis  9 Days Post-Op    Recommendations:     Discharge Recommendations: nursing facility, skilled  Discharge Equipment Recommendations:  (TBD)  Barriers to discharge:  Decreased caregiver support    Assessment:   Patient tolerated modified sitting position using Kate Stedy x 20 mins /c fair tolerance. Patient labile and tearful -- reporting increased fear of activity. Patient also reporting lack of sleep and worry her seizures may come on 2/2 fatigue. Patient will benefit from continued skilled OT to address deficits and improve performance in functional ADL tasks. Cont OT.    Annette Garcia is a 84 y.o. female with a medical diagnosis of Colon necrosis. Performance deficits affecting function are weakness, impaired endurance, impaired self care skills, impaired functional mobilty, gait instability, impaired balance, decreased coordination, decreased upper extremity function, decreased lower extremity function, decreased safety awareness, decreased ROM.     Rehab Prognosis:  Fair; patient would benefit from acute skilled OT services to address these deficits and reach maximum level of function.       Plan:     Patient to be seen 5 x/week to address the above listed problems via self-care/home management, therapeutic activities, therapeutic exercises  · Plan of Care Expires: 12/01/19  · Plan of Care Reviewed with: patient, family    Subjective     Pain/Comfort:  · Pain Rating 1: 0/10    Objective:     Communicated with: Leela jimenez prior to session.  Patient found HOB elevated with bed alarm, central line, gonzales catheter, telemetry, pressure relief boots, colostomy upon OT entry to room.    General Precautions: Standard, fall, aspiration, seizure   Orthopedic Precautions:N/A   Braces: N/A     Bed Mobility:    · Patient completed Rolling/Turning to Left with  maximal assistance and with side rail  · Patient  completed Scooting/Bridging with maxA to EOB; Depx2 via drawsheet to HOB  · Patient completed Supine to Sit with maximal assistance and 2 persons  · Patient completed Sit to Supine with maximal assistance and 2 persons     Functional Mobility/Transfers:  · Patient completed Sit <> Stand Transfer with maximal assistance and of 2 persons  with  Kate Stedy device     Activities of Daily Living:  · N/A    [facility] 6 Click ADL: 9    Treatment & Education:  Patient /c faint, low voice. Reporting increased fear over activity (labile at times). Patient sat EOB briefly before utilizing SS standing device. Tolerated semi-seated position on device x ~20 mins - rested head on pillow 2/2 reporting feeling nausea and patient began crying over situation. Encouragement provided. ENT entered room for laryngoscopy and patient returned to bed level ,HOB elevated near chair position.    Patient left HOB elevated with all lines intact, call button in reach, bed alarm on and nsg notifiedEducation:      GOALS:   Multidisciplinary Problems     Occupational Therapy Goals        Problem: Occupational Therapy Goal    Goal Priority Disciplines Outcome Interventions   Occupational Therapy Goal     OT, PT/OT Ongoing, Progressing    Description:  Goals to be met by: 12/1     Patient will increase functional independence with ADLs by performing:  MET 11/1/19 Pt will maintain gaze x 30 secs  MET 11/1/19 Pt will follow 1 step commands 25% of the time  Pt's family will be indep w/PROM    New Goals set 11/4/2019  Pt will complete AROM shoulder flex/ext through 75% of available AROM.  Pt will complete simple grooming task min A seated with HOB elevated.                            Time Tracking:     OT Date of Treatment: 11/08/19  OT Start Time: 1128  OT Stop Time: 1202  OT Total Time (min): 34 mins co-tx /c PT    Billable Minutes:Therapeutic Activity 16    MARY Rothman  11/8/2019

## 2019-11-08 NOTE — PLAN OF CARE
Plan of care reviewed with patient. Normal sinus rhythm on continuous heart monitor, no true red alarms. Safety maintained at this time. Bed in lowest position, side rails up x 2. Call light in reach.  Encouraged patient to use call light for assistance .Verbalized understanding, colostomy bad  to left lower quadrant  C/D/I draining loose brown stool. Urethral catheter in place draining clear yellow urine. Patient receiving IV antibiotics.No adverse reaction noted.Laryngoscopy scheduled for this morning.   Safety maintained. Will continue to monitor patient.

## 2019-11-08 NOTE — ASSESSMENT & PLAN NOTE
E. coli bacteremia  Acute tubular necrosis  DIC (disseminated intravascular coagulation)  Giving cefazolin and metronidazole per ID until 11/15/16. Status post sigmoidectomy. RRT held per Nephrology. Creatinine decreased to 1.9 and UOP over 2.4 liters. Continues to have renal recovery. Will remove trialysis catheter. Appreciate General Surgery, Pulmonology, Cardiology, Nephrology, Hematology-Oncology, and Infectious Disease.

## 2019-11-09 PROBLEM — J38.01 PARALYSIS OF RIGHT VOCAL CORD: Status: ACTIVE | Noted: 2019-11-09

## 2019-11-09 LAB
ALBUMIN SERPL BCP-MCNC: 2.4 G/DL (ref 3.5–5.2)
ALP SERPL-CCNC: 87 U/L (ref 55–135)
ALT SERPL W/O P-5'-P-CCNC: 5 U/L (ref 10–44)
ANION GAP SERPL CALC-SCNC: 9 MMOL/L (ref 8–16)
AST SERPL-CCNC: 30 U/L (ref 10–40)
BILIRUB SERPL-MCNC: 0.3 MG/DL (ref 0.1–1)
BUN SERPL-MCNC: 24 MG/DL (ref 8–23)
CALCIUM SERPL-MCNC: 6.8 MG/DL (ref 8.7–10.5)
CHLORIDE SERPL-SCNC: 106 MMOL/L (ref 95–110)
CO2 SERPL-SCNC: 26 MMOL/L (ref 23–29)
CREAT SERPL-MCNC: 1.1 MG/DL (ref 0.5–1.4)
EST. GFR  (AFRICAN AMERICAN): 54 ML/MIN/1.73 M^2
EST. GFR  (NON AFRICAN AMERICAN): 47 ML/MIN/1.73 M^2
GLUCOSE SERPL-MCNC: 124 MG/DL (ref 70–110)
MAGNESIUM SERPL-MCNC: 1.9 MG/DL (ref 1.6–2.6)
PHOSPHATE SERPL-MCNC: 2.2 MG/DL (ref 2.7–4.5)
POTASSIUM SERPL-SCNC: 3.3 MMOL/L (ref 3.5–5.1)
PROT SERPL-MCNC: 5 G/DL (ref 6–8.4)
SODIUM SERPL-SCNC: 141 MMOL/L (ref 136–145)
TB INDURATION 48 - 72 HR READ: 0 MM

## 2019-11-09 PROCEDURE — 25000003 PHARM REV CODE 250: Performed by: HOSPITALIST

## 2019-11-09 PROCEDURE — 11000001 HC ACUTE MED/SURG PRIVATE ROOM

## 2019-11-09 PROCEDURE — 83735 ASSAY OF MAGNESIUM: CPT

## 2019-11-09 PROCEDURE — 84100 ASSAY OF PHOSPHORUS: CPT

## 2019-11-09 PROCEDURE — 92522 EVALUATE SPEECH PRODUCTION: CPT

## 2019-11-09 PROCEDURE — 92612 ENDOSCOPY SWALLOW (FEES) VID: CPT

## 2019-11-09 PROCEDURE — 25000003 PHARM REV CODE 250: Performed by: NURSE PRACTITIONER

## 2019-11-09 PROCEDURE — 80053 COMPREHEN METABOLIC PANEL: CPT

## 2019-11-09 PROCEDURE — 25000003 PHARM REV CODE 250: Performed by: INTERNAL MEDICINE

## 2019-11-09 PROCEDURE — 63600175 PHARM REV CODE 636 W HCPCS: Performed by: HOSPITALIST

## 2019-11-09 PROCEDURE — 36415 COLL VENOUS BLD VENIPUNCTURE: CPT

## 2019-11-09 RX ORDER — CALCIUM CARBONATE 200(500)MG
1000 TABLET,CHEWABLE ORAL 3 TIMES DAILY
Status: DISCONTINUED | OUTPATIENT
Start: 2019-11-09 | End: 2019-11-12 | Stop reason: HOSPADM

## 2019-11-09 RX ADMIN — OXCARBAZEPINE 300 MG: 150 TABLET, FILM COATED ORAL at 09:11

## 2019-11-09 RX ADMIN — ACETAMINOPHEN 650 MG: 325 TABLET ORAL at 10:11

## 2019-11-09 RX ADMIN — METRONIDAZOLE 500 MG: 500 TABLET ORAL at 06:11

## 2019-11-09 RX ADMIN — PHENOBARBITAL 64.8 MG: 32.4 TABLET ORAL at 09:11

## 2019-11-09 RX ADMIN — HEPARIN SODIUM 5000 UNITS: 5000 INJECTION, SOLUTION INTRAVENOUS; SUBCUTANEOUS at 02:11

## 2019-11-09 RX ADMIN — CEFAZOLIN SODIUM 2 G: 2 SOLUTION INTRAVENOUS at 10:11

## 2019-11-09 RX ADMIN — METRONIDAZOLE 500 MG: 500 TABLET ORAL at 09:11

## 2019-11-09 RX ADMIN — CALCIUM CARBONATE (ANTACID) CHEW TAB 500 MG 1000 MG: 500 CHEW TAB at 02:11

## 2019-11-09 RX ADMIN — ACETAMINOPHEN 650 MG: 325 TABLET ORAL at 06:11

## 2019-11-09 RX ADMIN — HEPARIN SODIUM 5000 UNITS: 5000 INJECTION, SOLUTION INTRAVENOUS; SUBCUTANEOUS at 06:11

## 2019-11-09 RX ADMIN — CALCIUM CARBONATE (ANTACID) CHEW TAB 500 MG 1000 MG: 500 CHEW TAB at 10:11

## 2019-11-09 RX ADMIN — CALCIUM CARBONATE (ANTACID) CHEW TAB 500 MG 1000 MG: 500 CHEW TAB at 09:11

## 2019-11-09 RX ADMIN — PRAVASTATIN SODIUM 20 MG: 20 TABLET ORAL at 10:11

## 2019-11-09 RX ADMIN — METRONIDAZOLE 500 MG: 500 TABLET ORAL at 02:11

## 2019-11-09 RX ADMIN — LEVOTHYROXINE SODIUM 125 MCG: 25 TABLET ORAL at 06:11

## 2019-11-09 RX ADMIN — CEFAZOLIN SODIUM 2 G: 2 SOLUTION INTRAVENOUS at 09:11

## 2019-11-09 RX ADMIN — HEPARIN SODIUM 5000 UNITS: 5000 INJECTION, SOLUTION INTRAVENOUS; SUBCUTANEOUS at 09:11

## 2019-11-09 NOTE — ASSESSMENT & PLAN NOTE
E. coli bacteremia  Acute renal failure due to Acute tubular necrosis  DIC (disseminated intravascular coagulation)  Giving cefazolin and metronidazole per ID until 11/15/16. Status post sigmoidectomy. RRT held per Nephrology. Creatinine decreased to 1.4 and UOP continues to improve. Continues to have renal recovery. Removed trialysis catheter 11/7/19. Appreciate General Surgery, Pulmonology, Cardiology, Nephrology, Hematology-Oncology, and Infectious Disease.

## 2019-11-09 NOTE — ASSESSMENT & PLAN NOTE
Right vocal cord paralysis  Appreciate ENT evaluation.  Consult SLP for evaluation of voice.   ENT requests CT neck and chest with IV contrast, but will hold off on that for right now as kidneys continue to recover.

## 2019-11-09 NOTE — ASSESSMENT & PLAN NOTE
Right vocal cord paralysis with unknown cause. Did have a central line placed in right IJ but due to fact it would be extremely unlikely for that to cause a cord paralysis, would recommend CT Neck and Chest with contrast to evaluate course of the nerve. If patient develops any signs of aspiration or trouble swallowing would proceed with urgent injection of cord. Otherwise, will continue diet as tolerated per primary team and recommend outpatient follow up in clinic.    Please call with any questions or concerns.

## 2019-11-09 NOTE — PROGRESS NOTES
[facility]  Progress Note    Patient Name: Annette Garcia  MRN: [id number]  Patient Class: IP- Inpatient   Admission Date: 10/27/2019  Length of Stay: 12 days  Attending Physician: Ryan Espinosa MD  Primary Care Provider: Jose Valles MD        Subjective:     Principal Problem:Colon necrosis        HPI:  Annette Garcia is an 84 year old white woman with peripheral vascular disease, renovascular hypertension, renal artery stenosis status post left renal artery stent placement on 7/19/17, coronary artery disease with history of myocardial infarction, diffuse large B cell lymphoma, anemia, epilepsy, hypothyroidism, chronic hyponatremia, depression, lumbar and sacroiliac joint osteoarthritis with chronic low back pain and history of lumbar spine surgery in 2013, slow transit constipation, history of appendectomy, history of cholecystectomy history hysterectomy, history of small bowel obstruction status post small bowel resection on 8/23/16. She lives in [place], [place]. Her son has epilepsy. Her daughter has tuberous sclerosis. Her primary care physician is Dr. Jose Torres. Her pain management specialist is Dr. Chirag Bates. Her neurologist is Dr. Gamal Lock.    She presented to [facility] on 10/27/19 with abdominal pain, difficulty urinating, and constipation for one day. She took polyethylene glycol and senna-docusate without relief. She strained during her last attempt at having a bowel movement. She had one episode of vomiting on the day of presentation. She felt weak when walking. In the emergency department, she was found to have acute kidney injury (BUN 27, creatinine 1.6, from 9 and 0.7 on 8/29/19), elevated transaminases ( and , from 16 and 11 on 8/29/19), and lactic acidosis (5.8). Contrast CT showed acute enteritis, sigmoid and rectal constipation, and some peritoneal free fluid in the pericolic  magdy.she was given lactated ringers, cefepime, and metronidazole. She required norepinephrine for septic shock. She was admitted to [facility].    Overview/Hospital Course:  Blood cultures grew pan-sensitive Escherichia coli. Due to history of bowel resection for obstruction, General Surgery was consulted for her bowel obstruction. She was put on amikacin and cefepime. 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 She was kept intubated postoperatively. Hospital Medicine changed amikacin to metronidazole, then later stopped metronidazole and changed cefepime to meropenem. She was given total parenteral nutrition. She became oliguric but responded well to a dose of furosemide, so Nephrology gave her doses of bumetanide. She developed thrombocytopenia. She developed atrial fibrillation and sinus pauses, so Cardiology was consulted and placed a temporary transvenous pacemaker. Echocardiogram only showed concentric remodeling and mild mitral regurgitation. Norepinephrine was changed to dopamine. EEG showed triphasic waves bilaterally consistent with metabolic encephalopathy. The epileptologist recommended treating her hyponatremia. She required pRBC transfusion. Nephrology improved her hyponatremia, and encephalopathy resolved. She was weaned off vasopressors and was extubated the morning of 11/2/19. Surgery stopped TPN and started full liquid diet on 11/3/19. Ertapenem was changed to cefazolin but Infectious Disease restarted metronidazole to continue empiric anaerobe coverage. Cardiology removed her temporary pacemaker on 11/4/19.     Interval History: Doing okay today. Still not eating much. No nausea.     Review of Systems   Constitutional: Negative for chills and fever.   Respiratory: Positive for shortness of breath. Negative for cough.    Cardiovascular: Negative for chest pain and palpitations.   Gastrointestinal:  Negative for nausea and vomiting.     Objective:     Vital Signs (Most Recent):  Temp: 97.9 °F (36.6 °C) (11/08/19 1708)  Pulse: 87 (11/08/19 1708)  Resp: 19 (11/08/19 1708)  BP: (!) 142/65 (11/08/19 1708)  SpO2: 96 % (11/08/19 1708) Vital Signs (24h Range):  Temp:  [97.7 °F (36.5 °C)-99.3 °F (37.4 °C)] 97.9 °F (36.6 °C)  Pulse:  [82-93] 87  Resp:  [17-20] 19  SpO2:  [93 %-96 %] 96 %  BP: (142-171)/(65-92) 142/65     Weight: 68.1 kg (150 lb 2.1 oz)  Body mass index is 29.32 kg/m².    Intake/Output Summary (Last 24 hours) at 11/8/2019 1710  Last data filed at 11/8/2019 0908  Gross per 24 hour   Intake 417.5 ml   Output 2710 ml   Net -2292.5 ml      Physical Exam   Constitutional: She is oriented to person, place, and time. She appears well-developed and well-nourished. No distress. Nasal cannula in place.   Cardiovascular: Normal rate and regular rhythm.   No murmur heard.  Pulmonary/Chest: Effort normal and breath sounds normal. No respiratory distress. She has no wheezes.   Abdominal: Soft. Bowel sounds are normal. She exhibits no distension. There is tenderness.   Musculoskeletal: She exhibits edema.   Neurological: She is alert and oriented to person, place, and time.   Skin: Ecchymosis noted.   Psychiatric: She has a normal mood and affect. Her behavior is normal.   Nursing note and vitals reviewed.      Significant Labs:   CBC:   Recent Labs   Lab 11/07/19  0541 11/08/19 0622   WBC 8.75 8.08   HGB 7.0* 7.0*   HCT 22.0* 22.0*    200     CMP:   Recent Labs   Lab 11/07/19  0541 11/08/19 0622    141   K 3.4* 3.6    109   CO2 25 24    116*   BUN 55* 36*   CREATININE 1.9* 1.4   CALCIUM 6.6* 6.6*   PROT 4.7* 4.7*   ALBUMIN 2.2* 2.2*   BILITOT 0.4 0.3   ALKPHOS 100 87   AST 40 39   ALT 5* 5*   ANIONGAP 11 8   EGFRNONAA 24* 35*     Magnesium:   Recent Labs   Lab 11/07/19  0541 11/08/19  0622   MG 1.4* 1.4*       Significant Imaging: I have reviewed all pertinent imaging results/findings  within the past 24 hours.      Assessment/Plan:      * Colon necrosis  E. coli bacteremia  Acute renal failure due to Acute tubular necrosis  DIC (disseminated intravascular coagulation)  Giving cefazolin and metronidazole per ID until 11/15/16. Status post sigmoidectomy. RRT held per Nephrology. Creatinine decreased to 1.4 and UOP continues to improve. Continues to have renal recovery. Removed trialysis catheter 11/7/19. Appreciate General Surgery, Pulmonology, Cardiology, Nephrology, Hematology-Oncology, and Infectious Disease.     Hoarseness  Right vocal cord paralysis  Appreciate ENT evaluation.  Consult SLP for evaluation of voice.   ENT requests CT neck and chest with IV contrast, but will hold off on that for right now as kidneys continue to recover.        Acute urinary retention  Martinez in place.   Follow up with Urology as outpatient.       Anemia of acute infection  Transfused pRBCs.    Slow transit constipation  Takes polyethylene glycol at home.    Peripheral vascular disease, unspecified  Hyperlipidemia   Takes pravastatin.    Old MI (myocardial infarction)  Takes pravastatin.    Depression  Continue home mirtazapine 7.5mg HS.    Anemia due to antineoplastic chemotherapy  Thrombocytopenia  Stable.  Continue to monitor.     DLBCL (diffuse large B cell lymphoma)  Follow up outpatient.    Acquired hypothyroidism  Continue home levothyroxine 125 mcg before breakfast.  Lab Results   Component Value Date    TSH 0.507 11/01/2019       Epilepsy  Continue home oxycarbazepine 300 mg nightly, phenobarbital 64.8 mg nightly.    Renovascular hypertension  Holding home lisinopril, clonidine, carvedilol.  SBP ranged 130 to 151   Continue to monitor off her home medications.       VTE Risk Mitigation (From admission, onward)         Ordered     heparin (porcine) 1,000 unit/mL injection      11/04/19 1534     heparin (porcine) injection 5,000 Units  Every 8 hours      11/02/19 1447     heparin, porcine (PF) 100 unit/mL  injection flush 1,000 Units  As needed (PRN)      11/02/19 2049     IP VTE LOW RISK PATIENT  Once      10/30/19 0811     Place sequential compression device  Until discontinued      10/28/19 0607                      Ryan Espinosa MD  Department of Hospital Medicine   [facility]

## 2019-11-09 NOTE — PT/OT/SLP EVAL
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    In the setting of septic shock and near death    Petit mal epilepsy 1954    Scoliosis of lumbar spine     Seizures     Unspecified hypothyroidism        Past Surgical History:   Procedure Laterality Date    APPENDECTOMY      BACK SURGERY  1988    vertebral fracture    BACK SURGERY  02/2013    lumbar L2-5    CATARACT EXTRACTION, BILATERAL Bilateral     CHOLECYSTECTOMY      open    EYE SURGERY Bilateral     cataract removal with lens implant    HYSTERECTOMY      PORTACATH PLACEMENT Right 09/2016    RENAL ARTERY STENT Left 07/19/2017    SIGMOIDECTOMY  10/29/2019    SMALL INTESTINE SURGERY  08/23/2016    TONSILLECTOMY      VAGINAL HYSTERECTOMY W/ ANTERIOR AND POSTERIOR VAGINAL REPAIR       Social History: She lives in [place], [place] with her daughter.    Intubation: 10/29/19-11/2/19    Subjective:     Pt awake, alert, agreeable to voice assessment. Pt evaluated by ENT and diagnosed with Right Vocal Fold Paralysis.    Pain/Comfort:  · Pain Rating 1: 0/10    Objective:     History of Voice Disturbance:  · New onset s/p intubation/extubation       Intubation History:  · Duration: (10/29/19-11/2/19)    ENT Assessment Completed?   · Yes  ENT Otolaryngology Report: flexible nasolarynoscopy completed revealing  left paralysis    Pitch:  · Unable to asses 2/2 aphonia    Loudness:  · Aphonic    Quality:  · Aphonic    Resonance:  · Unable to asses 2/2 aphonia    Phonatory Ability:  · Unable 2/2  aphonia    Overall Severity of Voice Disturbance:  · Severe    Therapeutic Intervention:  · Stretch and flow  · Vocal function exercises  · Push-pull  · Diaphragmatic breathing  · Yawn sigh    Response to Treatment:  · Ongoing treatment warranted    Education Provided Regarding the Following:  · Phonatory and respiratory systems  · Vocal hygiene  · Voice exercise regimen  · Ongoing follow-up with SLP as outpatient  · Ongoing education and support warranted    Assessment:     Annette Garcia is a 84 y.o. female with an SLP diagnosis of Aphonia.    Goals:   Multidisciplinary Problems     SLP Goals        Problem: SLP Goal    Goal Priority Disciplines Outcome   SLP Goal     SLP    Description:  Goals:  1. Pt will participate in vocal function exercises in order to address unilateral vocal fold paralysis.                  Plan:     · Patient to be seen:  2 x/week, 3 x/week   · Plan of Care expires:  12/08/19  · Plan of Care reviewed with:  patient   · SLP Follow-Up:  Yes       Discharge recommendations:  nursing facility, skilled     Time Tracking:     SLP Treatment Date:   11/09/19  Speech Start Time:  1405  Speech Stop Time:  1426     Speech Total Time (min):  21 min    Billable Minutes: Eval 11 minutes  and [facility]Home Management Training 10 minutes    Malka Vela, YURY-SLP  11/09/2019

## 2019-11-09 NOTE — PROGRESS NOTES
[facility]  General Surgery  Progress Note    Subjective:     Interval History:   Feeling well, minimal pain  Ostomy working well  In good spirits, stood up with PT  Julianne reg diet, eating some better  Voice not improving  evaulated by ENT today at bedside, right sided vocal cord paralyzed    Post-Op Info:  Procedure(s) (LRB):  Insertion, Pacemaker, Temporary Transvenous (Right)   10 Days Post-Op      Medications:  Continuous Infusions:  Scheduled Meds:   calcium carbonate  1,000 mg Oral TID    ceFAZolin (ANCEF) IVPB  2 g Intravenous Q12H    epoetin moe-ebpx (RETACRIT) injection  20,000 Units Subcutaneous Every Tues, Thurs, Sat    heparin (porcine)  5,000 Units Subcutaneous Q8H    iron sucrose  100 mg Intravenous Every Mon, Wed, Fri    levothyroxine  125 mcg Oral Before breakfast    metroNIDAZOLE  500 mg Oral Q8H    OXcarbazepine  300 mg Oral QHS    PHENobarbital  64.8 mg Oral Nightly    pravastatin  20 mg Oral Daily     PRN Meds:acetaminophen, albuterol-ipratropium, heparin, porcine (PF), morphine, ondansetron, promethazine (PHENERGAN) IVPB, ramelteon, sodium chloride 0.9%     Objective:     Vital Signs (Most Recent):  Temp: 99 °F (37.2 °C) (11/09/19 0818)  Pulse: 87 (11/09/19 0818)  Resp: 18 (11/09/19 0818)  BP: (!) 169/75 (11/09/19 0818)  SpO2: 96 % (11/09/19 0818) Vital Signs (24h Range):  Temp:  [97.9 °F (36.6 °C)-99.7 °F (37.6 °C)] 99 °F (37.2 °C)  Pulse:  [81-87] 87  Resp:  [16-19] 18  SpO2:  [93 %-96 %] 96 %  BP: (111-169)/(53-75) 169/75       Intake/Output Summary (Last 24 hours) at 11/9/2019 1229  Last data filed at 11/9/2019 1047  Gross per 24 hour   Intake 450 ml   Output 1825 ml   Net -1375 ml       Physical Exam   Constitutional: She is oriented to person, place, and time.   Cardiovascular: Normal rate.   Pulmonary/Chest: Effort normal. No respiratory distress.   Abdominal: Soft. There is no tenderness.   Ostomy with good output   Neurological: She is alert and oriented to  person, place, and time.       Significant Labs:  CBC:   Recent Labs   Lab 11/08/19  0622   WBC 8.08   RBC 2.14*   HGB 7.0*   HCT 22.0*      *   MCH 32.7*   MCHC 31.8*     CMP:   Recent Labs   Lab 11/09/19  0539   *   CALCIUM 6.8*   ALBUMIN 2.4*   PROT 5.0*      K 3.3*   CO2 26      BUN 24*   CREATININE 1.1   ALKPHOS 87   ALT 5*   AST 30   BILITOT 0.3       Significant Diagnostics:  I have reviewed all pertinent imaging results/findings within the past 24 hours.    Assessment/Plan:     Active Diagnoses:    Diagnosis Date Noted POA    PRINCIPAL PROBLEM:  Colon necrosis [K55.049] 10/27/2019 Yes    Hoarseness [R49.0] 11/07/2019 No    JAS (acute kidney injury) [N17.9] 11/06/2019 Yes    Acute urinary retention [R33.8] 11/04/2019 Yes    Anemia of acute infection [D64.9] 11/01/2019 No    Enteritis [K52.9]  Yes    E coli bacteremia [R78.81] 10/29/2019 Yes    Abdominal pain [R10.9] 10/29/2019 Yes    Old MI (myocardial infarction) [I25.2] 10/28/2019 Not Applicable     Chronic    Peripheral vascular disease, unspecified [I73.9] 10/28/2019 Yes     Chronic    Slow transit constipation [K59.01] 10/28/2019 Yes     Chronic    Depression [F32.9] 10/17/2019 Yes    History of stent insertion of left renal artery 7/19/17 [Z98.890] 07/19/2017 Not Applicable     Chronic    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, T45.1X5A] 12/27/2016 Yes     Chronic    DLBCL (diffuse large B cell lymphoma) [C83.30] 09/09/2016 Yes     Chronic    Acquired hypothyroidism [E03.9] 08/18/2016 Yes     Chronic    Renovascular hypertension [I15.0] 08/18/2016 Yes     Chronic    Epilepsy [G40.909] 08/18/2016 Yes     Chronic      Problems Resolved During this Admission:    Diagnosis Date Noted Date Resolved POA    Thrombocytopenia [D69.6] 11/01/2019 11/07/2019 No    Lactic acidosis [E87.2]  11/02/2019 Yes    Lone atrial fibrillation [I48.91] 10/30/2019 11/05/2019 Yes    Sinus pause [I45.5] 10/30/2019  11/05/2019 No    DIC (disseminated intravascular coagulation) [D65] 10/29/2019 11/07/2019 Yes    E. coli septic shock [A41.51, R65.21] 10/28/2019 11/02/2019 Yes    Abnormal liver enzymes [R74.8] 10/28/2019 11/02/2019 Yes    Fecal obstruction [K56.41]  11/02/2019 Yes    Chronic hyponatremia [E87.1] 10/12/2016 11/09/2019 Yes     Chronic     84F s/p sigmoid resection with end colostomy  continue abx; per ID stop date for IV cefazolin/metronidazole 11/15/19 (metro switched to po)  PT/OT  Continue regular diet, boost, suplena, encourage PT  Right vocal cord paralysis -Fu ENT - planning CT of neck later after kidney have a few more days to recover      Chris Johnson MD  General Surgery  [facility]

## 2019-11-09 NOTE — PLAN OF CARE
Problem: Device-Related Complication Risk (CRRT (Continuous Renal Replacement Therapy))  Goal: Safe, Effective Therapy Delivery  Outcome: Ongoing, Progressing     Problem: Infection (CRRT (Continuous Renal Replacement Therapy))  Goal: Absence of Infection Signs/Symptoms  Outcome: Ongoing, Progressing     Problem: Mobility Impairment  Goal: Optimal Mobility  Outcome: Ongoing, Progressing     Problem: Oral Intake Inadequate  Goal: Improved Oral Intake  Outcome: Ongoing, Progressing     Problem: Oral Intake Inadequate  Goal: Improved Oral Intake  Outcome: Ongoing, Progressing     Problem: Oral Intake Inadequate  Goal: Improved Oral Intake  Outcome: Ongoing, Progressing   Pt AAO x 4. VSS. NAD. IV antibiotics infusing per mar. No complaints of pain. Tolerating mechanical soft diet. Martinez and colostomy in place for elimination.  Midline incision clean, dry, intact. Cardiac monitored. Safety maintained. Will continue to monitor.

## 2019-11-09 NOTE — CONSULTS
[facility]  Otorhinolaryngology-Head & Neck Surgery  Consult Note    Patient Name: Annette Garcia  MRN: [id number]  Code Status: DNR  Admission Date: 10/27/2019  Hospital Length of Stay: 12 days  Attending Physician: Ryan Espinosa MD  Primary Care Provider: Jose Valles MD    Patient information was obtained from patient and ER records.     Consults  Subjective:     Chief Complaint/Reason for Admission: hoarseness    History of Present Illness: 84 year old female admitted with sepsis, SBO s/p colectomy on 10/29/19. Patient remained intubated after surgery for several days. She was extubated 11/2. Per family her voice was better right after extubation but now is very weak and breathy. She denies any throat pain or trouble swallowing. She has been evaluated by speech and is tolerating a diet. She denies any trouble breathing. She has not had any neck or cardiac surgeries. She did have a dialysis catheter placed on the right.     Medications:  Continuous Infusions:  Scheduled Meds:   calcium carbonate  1,000 mg Oral TID    ceFAZolin (ANCEF) IVPB  2 g Intravenous Q12H    epoetin moe-ebpx (RETACRIT) injection  20,000 Units Subcutaneous Every Tues, Thurs, Sat    heparin (porcine)  5,000 Units Subcutaneous Q8H    iron sucrose  100 mg Intravenous Every Mon, Wed, Fri    levothyroxine  125 mcg Oral Before breakfast    metroNIDAZOLE  500 mg Oral Q8H    OXcarbazepine  300 mg Oral QHS    PHENobarbital  64.8 mg Oral Nightly    pravastatin  20 mg Oral Daily     PRN Meds:acetaminophen, albuterol-ipratropium, heparin, porcine (PF), morphine, ondansetron, promethazine (PHENERGAN) IVPB, ramelteon, sodium chloride 0.9%     No current facility-administered medications on file prior to encounter.      Current Outpatient Medications on File Prior to Encounter   Medication Sig    acetaminophen (TYLENOL) 325 MG tablet Take 2 tablets (650 mg total) by mouth every 4 (four) hours as needed for Pain.     calcium carbonate (OS-RICHARDSON) 600 mg calcium (1,500 mg) Tab Take 600 mg by mouth once.    carvedilol (COREG) 25 MG tablet Take 1 tablet (25 mg total) by mouth 2 (two) times daily with meals.    cloNIDine (CATAPRES) 0.2 MG tablet Take 1 tablet (0.2 mg total) by mouth 2 (two) times daily.    denosumab (PROLIA) 60 mg/mL Syrg Inject 60 mg into the skin every 6 (six) months.    levothyroxine (SYNTHROID) 125 MCG tablet TAKE 1 TABLET (125 MCG TOTAL) BY MOUTH ONCE DAILY.    lisinopril (PRINIVIL,ZESTRIL) 40 MG tablet Take 1 tablet (40 mg total) by mouth once daily.    magnesium 250 mg Tab Take by mouth once.    mirtazapine (REMERON) 7.5 MG Tab Take 1 tablet (7.5 mg total) by mouth every evening. (Patient not taking: Reported on 10/18/2019)    ondansetron (ZOFRAN) 4 MG tablet Take 1 tablet (4 mg total) by mouth every 8 (eight) hours as needed for Nausea.    OXcarbazepine (TRILEPTAL) 300 MG Tab Take 1 tablet (300 mg total) by mouth nightly.    oxybutynin (DITROPAN) 5 MG Tab One po every 6 hours as need for bladder irritation    PHENobarbital (LUMINAL) 64.8 MG tablet TAKE 1 TABLET (64.8 MG TOTAL) BY MOUTH EVERY EVENING.    polyethylene glycol (GLYCOLAX) 17 gram PwPk Take 17 g by mouth 2 (two) times daily as needed (Constipation).    pravastatin (PRAVACHOL) 20 MG tablet Take 1 tablet (20 mg total) by mouth once daily.    SHINGRIX, PF, 50 mcg/0.5 mL injection     triamcinolone acetonide 0.1% (KENALOG) 0.1 % cream Apply topically 2 (two) times daily. When needed for sores on her head    vitamin D (VITAMIN D3) 1000 units Tab Take 1,000 Units by mouth once daily.       Review of patient's allergies indicates:   Allergen Reactions    Adhesive Itching and Blisters    Penicillins Anaphylaxis    Tramadol Hives    Avelox [moxifloxacin] Rash     Facial and arm itching and redness. Pt states throat closes when given.   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    In the setting of septic shock and near death    Petit mal epilepsy 1954    Scoliosis of lumbar spine     Seizures     Unspecified hypothyroidism      Past Surgical History:   Procedure Laterality Date    APPENDECTOMY      BACK SURGERY  1988    vertebral fracture    BACK SURGERY  02/2013    lumbar L2-5    CATARACT EXTRACTION, BILATERAL Bilateral     CHOLECYSTECTOMY      open    EYE SURGERY Bilateral     cataract removal with lens implant    HYSTERECTOMY      PORTACATH PLACEMENT Right 09/2016    RENAL ARTERY STENT Left 07/19/2017    SIGMOIDECTOMY  10/29/2019    SMALL INTESTINE SURGERY  08/23/2016    TONSILLECTOMY      VAGINAL HYSTERECTOMY W/ ANTERIOR AND POSTERIOR VAGINAL REPAIR       Family History     Problem Relation (Age of Onset)    Heart attack Father    Hypertension Father    Pancreatic cancer Mother        Tobacco Use    Smoking status: Never Smoker    Smokeless tobacco: Never Used   Substance and Sexual Activity    Alcohol use: No    Drug use: No    Sexual activity: Not on file     Review of Systems   Constitutional: Negative for chills and fatigue.   HENT: Positive for trouble swallowing and voice change.    Eyes: Negative for visual disturbance.   Respiratory: Negative for shortness of breath and stridor.    Cardiovascular: Negative for chest pain.   Gastrointestinal: Negative for abdominal pain, nausea and vomiting.   Musculoskeletal: Positive for neck pain. Negative for neck stiffness.    Skin: Negative for rash.   Neurological: Negative for facial asymmetry, weakness and numbness.   Psychiatric/Behavioral: Negative for agitation. The patient is not nervous/anxious.      Objective:     Vital Signs (Most Recent):  Temp: 99 °F (37.2 °C) (11/09/19 0818)  Pulse: 94 (11/09/19 1200)  Resp: 18 (11/09/19 0818)  BP: (!) 169/75 (11/09/19 0818)  SpO2: 96 % (11/09/19 0818) Vital Signs (24h Range):  Temp:  [97.9 °F (36.6 °C)-99.7 °F (37.6 °C)] 99 °F (37.2 °C)  Pulse:  [81-94] 94  Resp:  [16-19] 18  SpO2:  [93 %-96 %] 96 %  BP: (111-169)/(53-75) 169/75     Weight: 68.1 kg (150 lb 2.1 oz)  Body mass index is 29.32 kg/m².    Date 11/09/19 0700 - 11/10/19 0659   Shift 1126-5589 4508-0707 4426-5811 24 Hour Total   INTAKE   P.O. 150   150   IV Piggyback 100   100   Shift Total(mL/kg) 250(3.7)   250(3.7)   OUTPUT   Shift Total(mL/kg)       Weight (kg) 68.1 68.1 68.1 68.1       Physical Exam   Constitutional: She is oriented to person, place, and time. She appears well-developed and well-nourished. No distress.   HENT:   Head: Normocephalic and atraumatic.   Right Ear: External ear normal.   Left Ear: External ear normal.   Nose: Nose normal.   Mouth/Throat: Oropharynx is clear and moist.   Eyes: Pupils are equal, round, and reactive to light. EOM are normal.   Neck: Normal range of motion. Neck supple.   Bandage over central line site c/d/i, no other scars   Cardiovascular: Normal rate and regular rhythm.   Pulmonary/Chest: No stridor. She is in respiratory distress.   Musculoskeletal: She exhibits no edema or deformity.   Neurological: She is alert and oriented to person, place, and time. No cranial nerve deficit.   Skin: Skin is warm and dry. No rash noted.   Psychiatric: She has a normal mood and affect. Her behavior is normal.       Significant Labs:  All pertinent labs from the last 24 hours have been reviewed.    Significant Diagnostics:  I have reviewed and interpreted all pertinent imaging results/findings  within the past 24 hours.    Assessment/Plan:     Paralysis of right vocal cord  Right vocal cord paralysis with unknown cause. Did have a central line placed in right IJ but due to fact it would be extremely unlikely for that to cause a cord paralysis, would recommend CT Neck and Chest with contrast to evaluate course of the nerve. If patient develops any signs of aspiration or trouble swallowing would proceed with urgent injection of cord. Otherwise, will continue diet as tolerated per primary team and recommend outpatient follow up in clinic.    Please call with any questions or concerns.      VTE Risk Mitigation (From admission, onward)         Ordered     heparin (porcine) 1,000 unit/mL injection      11/04/19 1534     heparin (porcine) injection 5,000 Units  Every 8 hours      11/02/19 1447     heparin, porcine (PF) 100 unit/mL injection flush 1,000 Units  As needed (PRN)      11/02/19 2049     IP VTE LOW RISK PATIENT  Once      10/30/19 0811     Place sequential compression device  Until discontinued      10/28/19 0607                Thank you for your consult.    Selam Haque MD  Otorhinolaryngology-Head & Neck Surgery  [facility]

## 2019-11-09 NOTE — PROGRESS NOTES
ID Staff - Grey ([phone])    Patient seen and examined - sitting up in bed with family in room;  Feels good; sat in chair - has some dizziness    Eating well; no itching, no SOB, still whispering     calcium carbonate  1,000 mg Oral TID    ceFAZolin (ANCEF) IVPB  2 g Intravenous Q12H    epoetin moe-ebpx (RETACRIT) injection  20,000 Units Subcutaneous Every Tues, Thurs, Sat    heparin (porcine)  5,000 Units Subcutaneous Q8H    iron sucrose  100 mg Intravenous Every Mon, Wed, Fri    levothyroxine  125 mcg Oral Before breakfast    metroNIDAZOLE  500 mg Oral Q8H    OXcarbazepine  300 mg Oral QHS    PHENobarbital  64.8 mg Oral Nightly    pravastatin  20 mg Oral Daily       Exam:  BP (!) 169/75   Pulse 87   Temp 99 °F (37.2 °C) (Oral)   Resp 18   Ht 5' (1.524 m)   Wt 68.1 kg (150 lb 2.1 oz)   SpO2 96%   Breastfeeding? No   BMI 29.32 kg/m²     Skin - bruising as before - no rash  HEENT - EOMI, mouth clear  Neck supple  Heart RRR, 2/6 BOLA base  Chest clear  Abd: incision intact; brown stool in ostomy bag    Labs:  Chem - - - glu 124    Creat 1.1    K 3.3   LFT's normal    Micro: no new findings    IMP and REC:    85 y/o admitted 10/27 and found to have intra-abdominal source of sepsis due to perforated large bowel and underwent sigmoid colectomy with colostomy formation.  Has done well post-op     No intra-operative cultures done - on cefazolin and metronidazole empirically and to cover the E coli that grew from blood culture   Already on po metronidazole - can be changed to po cephalexin at dose of 500mg Q 8 hours (creat clearance 34)    Assuming she continues to do well - course is 14 days from first negative blood culture (11/01) - so stop date is Nov 15    Laryngeal nerve paralysis -  Patient Active Problem List   Diagnosis    Renovascular hypertension    Epilepsy    Acquired hypothyroidism    Osteoarthritis of lumbar spine    Degenerative joint disease of sacroiliac joint    S/P  exploratory laparotomy    DLBCL (diffuse large B cell lymphoma)    Anemia due to antineoplastic chemotherapy    Bilateral renal artery stenosis    Closed comminuted fracture of right humerus    Closed displaced fracture of distal phalanx of right little finger    Age-related osteoporosis with current pathological fracture with routine healing    Closed wedge compression fracture of eleventh thoracic vertebra with routine healing    DDD (degenerative disc disease), lumbar    Chronic bilateral low back pain without sciatica    Age-related osteoporosis without current pathological fracture    Vitamin D deficiency    Uncontrolled hypertension    Subjective memory complaints    Depression    Old MI (myocardial infarction)    Peripheral vascular disease, unspecified    History of stent insertion of left renal artery 7/19/17    Colon necrosis    Slow transit constipation    E coli bacteremia    Abdominal pain    Anemia of acute infection    Enteritis    Acute urinary retention    JSA (acute kidney injury)    Hoarseness

## 2019-11-09 NOTE — ASSESSMENT & PLAN NOTE
Holding home lisinopril, clonidine, carvedilol.  SBP ranged 130 to 151   Continue to monitor off her home medications.

## 2019-11-09 NOTE — SUBJECTIVE & OBJECTIVE
Medications:  Continuous Infusions:  Scheduled Meds:   calcium carbonate  1,000 mg Oral TID    ceFAZolin (ANCEF) IVPB  2 g Intravenous Q12H    epoetin moe-ebpx (RETACRIT) injection  20,000 Units Subcutaneous Every Tues, Thurs, Sat    heparin (porcine)  5,000 Units Subcutaneous Q8H    iron sucrose  100 mg Intravenous Every Mon, Wed, Fri    levothyroxine  125 mcg Oral Before breakfast    metroNIDAZOLE  500 mg Oral Q8H    OXcarbazepine  300 mg Oral QHS    PHENobarbital  64.8 mg Oral Nightly    pravastatin  20 mg Oral Daily     PRN Meds:acetaminophen, albuterol-ipratropium, heparin, porcine (PF), morphine, ondansetron, promethazine (PHENERGAN) IVPB, ramelteon, sodium chloride 0.9%     No current facility-administered medications on file prior to encounter.      Current Outpatient Medications on File Prior to Encounter   Medication Sig    acetaminophen (TYLENOL) 325 MG tablet Take 2 tablets (650 mg total) by mouth every 4 (four) hours as needed for Pain.    calcium carbonate (OS-RICHARDSON) 600 mg calcium (1,500 mg) Tab Take 600 mg by mouth once.    carvedilol (COREG) 25 MG tablet Take 1 tablet (25 mg total) by mouth 2 (two) times daily with meals.    cloNIDine (CATAPRES) 0.2 MG tablet Take 1 tablet (0.2 mg total) by mouth 2 (two) times daily.    denosumab (PROLIA) 60 mg/mL Syrg Inject 60 mg into the skin every 6 (six) months.    levothyroxine (SYNTHROID) 125 MCG tablet TAKE 1 TABLET (125 MCG TOTAL) BY MOUTH ONCE DAILY.    lisinopril (PRINIVIL,ZESTRIL) 40 MG tablet Take 1 tablet (40 mg total) by mouth once daily.    magnesium 250 mg Tab Take by mouth once.    mirtazapine (REMERON) 7.5 MG Tab Take 1 tablet (7.5 mg total) by mouth every evening. (Patient not taking: Reported on 10/18/2019)    ondansetron (ZOFRAN) 4 MG tablet Take 1 tablet (4 mg total) by mouth every 8 (eight) hours as needed for Nausea.    OXcarbazepine (TRILEPTAL) 300 MG Tab Take 1 tablet (300 mg total) by mouth nightly.    oxybutynin  (DITROPAN) 5 MG Tab One po every 6 hours as need for bladder irritation    PHENobarbital (LUMINAL) 64.8 MG tablet TAKE 1 TABLET (64.8 MG TOTAL) BY MOUTH EVERY EVENING.    polyethylene glycol (GLYCOLAX) 17 gram PwPk Take 17 g by mouth 2 (two) times daily as needed (Constipation).    pravastatin (PRAVACHOL) 20 MG tablet Take 1 tablet (20 mg total) by mouth once daily.    SHINGRIX, PF, 50 mcg/0.5 mL injection     triamcinolone acetonide 0.1% (KENALOG) 0.1 % cream Apply topically 2 (two) times daily. When needed for sores on her head    vitamin D (VITAMIN D3) 1000 units Tab Take 1,000 Units by mouth once daily.       Review of patient's allergies indicates:   Allergen Reactions    Adhesive Itching and Blisters    Penicillins Anaphylaxis    Tramadol Hives    Avelox [moxifloxacin] Rash     Facial and arm itching and redness. Pt states throat closes when given.   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    In the setting of septic shock and near death    Petit mal epilepsy 1954    Scoliosis of lumbar spine     Seizures     Unspecified hypothyroidism      Past Surgical History:   Procedure Laterality Date    APPENDECTOMY      BACK SURGERY  1988    vertebral fracture    BACK SURGERY  02/2013    lumbar L2-5    CATARACT EXTRACTION, BILATERAL Bilateral     CHOLECYSTECTOMY      open    EYE SURGERY Bilateral     cataract removal with lens implant    HYSTERECTOMY       PORTACATH PLACEMENT Right 09/2016    RENAL ARTERY STENT Left 07/19/2017    SIGMOIDECTOMY  10/29/2019    SMALL INTESTINE SURGERY  08/23/2016    TONSILLECTOMY      VAGINAL HYSTERECTOMY W/ ANTERIOR AND POSTERIOR VAGINAL REPAIR       Family History     Problem Relation (Age of Onset)    Heart attack Father    Hypertension Father    Pancreatic cancer Mother        Tobacco Use    Smoking status: Never Smoker    Smokeless tobacco: Never Used   Substance and Sexual Activity    Alcohol use: No    Drug use: No    Sexual activity: Not on file     Review of Systems   Constitutional: Negative for chills and fatigue.   HENT: Positive for trouble swallowing and voice change.    Eyes: Negative for visual disturbance.   Respiratory: Negative for shortness of breath and stridor.    Cardiovascular: Negative for chest pain.   Gastrointestinal: Negative for abdominal pain, nausea and vomiting.   Musculoskeletal: Positive for neck pain. Negative for neck stiffness.   Skin: Negative for rash.   Neurological: Negative for facial asymmetry, weakness and numbness.   Psychiatric/Behavioral: Negative for agitation. The patient is not nervous/anxious.      Objective:     Vital Signs (Most Recent):  Temp: 99 °F (37.2 °C) (11/09/19 0818)  Pulse: 94 (11/09/19 1200)  Resp: 18 (11/09/19 0818)  BP: (!) 169/75 (11/09/19 0818)  SpO2: 96 % (11/09/19 0818) Vital Signs (24h Range):  Temp:  [97.9 °F (36.6 °C)-99.7 °F (37.6 °C)] 99 °F (37.2 °C)  Pulse:  [81-94] 94  Resp:  [16-19] 18  SpO2:  [93 %-96 %] 96 %  BP: (111-169)/(53-75) 169/75     Weight: 68.1 kg (150 lb 2.1 oz)  Body mass index is 29.32 kg/m².    Date 11/09/19 0700 - 11/10/19 0659   Shift 3629-4241 1861-5214 2135-4382 24 Hour Total   INTAKE   P.O. 150   150   IV Piggyback 100   100   Shift Total(mL/kg) 250(3.7)   250(3.7)   OUTPUT   Shift Total(mL/kg)       Weight (kg) 68.1 68.1 68.1 68.1       Physical Exam   Constitutional: She is oriented to person, place, and time. She appears  well-developed and well-nourished. No distress.   HENT:   Head: Normocephalic and atraumatic.   Right Ear: External ear normal.   Left Ear: External ear normal.   Nose: Nose normal.   Mouth/Throat: Oropharynx is clear and moist.   Eyes: Pupils are equal, round, and reactive to light. EOM are normal.   Neck: Normal range of motion. Neck supple.   Bandage over central line site c/d/i, no other scars   Cardiovascular: Normal rate and regular rhythm.   Pulmonary/Chest: No stridor. She is in respiratory distress.   Musculoskeletal: She exhibits no edema or deformity.   Neurological: She is alert and oriented to person, place, and time. No cranial nerve deficit.   Skin: Skin is warm and dry. No rash noted.   Psychiatric: She has a normal mood and affect. Her behavior is normal.       Significant Labs:  All pertinent labs from the last 24 hours have been reviewed.    Significant Diagnostics:  I have reviewed and interpreted all pertinent imaging results/findings within the past 24 hours.

## 2019-11-09 NOTE — SUBJECTIVE & OBJECTIVE
Interval History: Eating a little more each day, but still not eating much. Drinking a couple of boost each day. No nausea. Mild abdominal pain    Review of Systems   Constitutional: Negative for chills and fever.   Respiratory: Negative for cough and shortness of breath.    Cardiovascular: Negative for chest pain and palpitations.   Gastrointestinal: Negative for nausea and vomiting.     Objective:     Vital Signs (Most Recent):  Temp: 99.9 °F (37.7 °C) (11/09/19 1730)  Pulse: 92 (11/09/19 1730)  Resp: 18 (11/09/19 1730)  BP: (!) 163/76 (11/09/19 1730)  SpO2: (!) 94 % (11/09/19 1730) Vital Signs (24h Range):  Temp:  [98.7 °F (37.1 °C)-99.9 °F (37.7 °C)] 99.9 °F (37.7 °C)  Pulse:  [81-94] 92  Resp:  [16-18] 18  SpO2:  [93 %-96 %] 94 %  BP: (111-169)/(53-76) 163/76     Weight: 68.1 kg (150 lb 2.1 oz)  Body mass index is 29.32 kg/m².    Intake/Output Summary (Last 24 hours) at 11/9/2019 1734  Last data filed at 11/9/2019 1047  Gross per 24 hour   Intake 450 ml   Output 1175 ml   Net -725 ml      Physical Exam   Constitutional: She is oriented to person, place, and time. She appears well-developed and well-nourished. No distress. Nasal cannula in place.   Cardiovascular: Normal rate and regular rhythm.   No murmur heard.  Pulmonary/Chest: Effort normal and breath sounds normal. No respiratory distress. She has no wheezes.   Abdominal: Soft. Bowel sounds are normal. She exhibits no distension. There is tenderness.   Musculoskeletal: She exhibits edema.   Neurological: She is alert and oriented to person, place, and time.   Skin: Ecchymosis noted.   Psychiatric: She has a normal mood and affect. Her behavior is normal.   Nursing note and vitals reviewed.      Significant Labs:   CBC:   Recent Labs   Lab 11/08/19 0622   WBC 8.08   HGB 7.0*   HCT 22.0*        CMP:   Recent Labs   Lab 11/08/19 0622 11/09/19  0539    141   K 3.6 3.3*    106   CO2 24 26   * 124*   BUN 36* 24*   CREATININE 1.4 1.1    CALCIUM 6.6* 6.8*   PROT 4.7* 5.0*   ALBUMIN 2.2* 2.4*   BILITOT 0.3 0.3   ALKPHOS 87 87   AST 39 30   ALT 5* 5*   ANIONGAP 8 9   EGFRNONAA 35* 47*     Magnesium:   Recent Labs   Lab 11/08/19  0622 11/09/19  0539   MG 1.4* 1.9       Significant Imaging: I have reviewed all pertinent imaging results/findings within the past 24 hours.

## 2019-11-10 PROBLEM — E87.6 HYPOKALEMIA: Status: ACTIVE | Noted: 2019-11-10

## 2019-11-10 PROBLEM — E83.42 HYPOMAGNESEMIA: Status: ACTIVE | Noted: 2019-11-10

## 2019-11-10 PROBLEM — E83.39 HYPOPHOSPHATEMIA: Status: ACTIVE | Noted: 2019-11-10

## 2019-11-10 LAB
ALBUMIN SERPL BCP-MCNC: 2.5 G/DL (ref 3.5–5.2)
ALP SERPL-CCNC: 77 U/L (ref 55–135)
ALT SERPL W/O P-5'-P-CCNC: <5 U/L (ref 10–44)
ANION GAP SERPL CALC-SCNC: 6 MMOL/L (ref 8–16)
AST SERPL-CCNC: 28 U/L (ref 10–40)
BILIRUB SERPL-MCNC: 0.3 MG/DL (ref 0.1–1)
BUN SERPL-MCNC: 15 MG/DL (ref 8–23)
CALCIUM SERPL-MCNC: 7.3 MG/DL (ref 8.7–10.5)
CHLORIDE SERPL-SCNC: 105 MMOL/L (ref 95–110)
CO2 SERPL-SCNC: 28 MMOL/L (ref 23–29)
CREAT SERPL-MCNC: 0.9 MG/DL (ref 0.5–1.4)
EST. GFR  (AFRICAN AMERICAN): >60 ML/MIN/1.73 M^2
EST. GFR  (NON AFRICAN AMERICAN): 60 ML/MIN/1.73 M^2
GLUCOSE SERPL-MCNC: 105 MG/DL (ref 70–110)
MAGNESIUM SERPL-MCNC: 1.2 MG/DL (ref 1.6–2.6)
PHOSPHATE SERPL-MCNC: 2.1 MG/DL (ref 2.7–4.5)
POTASSIUM SERPL-SCNC: 3.4 MMOL/L (ref 3.5–5.1)
PROT SERPL-MCNC: 5.1 G/DL (ref 6–8.4)
SODIUM SERPL-SCNC: 139 MMOL/L (ref 136–145)

## 2019-11-10 PROCEDURE — 84100 ASSAY OF PHOSPHORUS: CPT

## 2019-11-10 PROCEDURE — 36415 COLL VENOUS BLD VENIPUNCTURE: CPT

## 2019-11-10 PROCEDURE — 25000003 PHARM REV CODE 250: Performed by: INTERNAL MEDICINE

## 2019-11-10 PROCEDURE — 25000003 PHARM REV CODE 250: Performed by: HOSPITALIST

## 2019-11-10 PROCEDURE — 63600175 PHARM REV CODE 636 W HCPCS: Performed by: HOSPITALIST

## 2019-11-10 PROCEDURE — 97530 THERAPEUTIC ACTIVITIES: CPT

## 2019-11-10 PROCEDURE — 25000003 PHARM REV CODE 250: Performed by: NURSE PRACTITIONER

## 2019-11-10 PROCEDURE — 11000001 HC ACUTE MED/SURG PRIVATE ROOM

## 2019-11-10 PROCEDURE — 83735 ASSAY OF MAGNESIUM: CPT

## 2019-11-10 PROCEDURE — 80053 COMPREHEN METABOLIC PANEL: CPT

## 2019-11-10 PROCEDURE — 97110 THERAPEUTIC EXERCISES: CPT

## 2019-11-10 RX ORDER — SODIUM,POTASSIUM PHOSPHATES 280-250MG
2 POWDER IN PACKET (EA) ORAL
Status: DISPENSED | OUTPATIENT
Start: 2019-11-10 | End: 2019-11-11

## 2019-11-10 RX ORDER — POTASSIUM CHLORIDE 20 MEQ/15ML
40 SOLUTION ORAL ONCE
Status: COMPLETED | OUTPATIENT
Start: 2019-11-10 | End: 2019-11-10

## 2019-11-10 RX ORDER — CARVEDILOL 12.5 MG/1
12.5 TABLET ORAL 2 TIMES DAILY
Status: DISCONTINUED | OUTPATIENT
Start: 2019-11-10 | End: 2019-11-11

## 2019-11-10 RX ORDER — LORAZEPAM/0.9% SODIUM CHLORIDE 100MG/0.1L
2 PLASTIC BAG, INJECTION (ML) INTRAVENOUS
Status: COMPLETED | OUTPATIENT
Start: 2019-11-10 | End: 2019-11-10

## 2019-11-10 RX ADMIN — METRONIDAZOLE 500 MG: 500 TABLET ORAL at 05:11

## 2019-11-10 RX ADMIN — CEFAZOLIN SODIUM 2 G: 2 SOLUTION INTRAVENOUS at 01:11

## 2019-11-10 RX ADMIN — POTASSIUM & SODIUM PHOSPHATES POWDER PACK 280-160-250 MG 2 PACKET: 280-160-250 PACK at 10:11

## 2019-11-10 RX ADMIN — PROMETHAZINE HYDROCHLORIDE 6.25 MG: 25 INJECTION INTRAMUSCULAR; INTRAVENOUS at 10:11

## 2019-11-10 RX ADMIN — HEPARIN SODIUM 5000 UNITS: 5000 INJECTION, SOLUTION INTRAVENOUS; SUBCUTANEOUS at 01:11

## 2019-11-10 RX ADMIN — HEPARIN SODIUM 5000 UNITS: 5000 INJECTION, SOLUTION INTRAVENOUS; SUBCUTANEOUS at 05:11

## 2019-11-10 RX ADMIN — CALCIUM CARBONATE (ANTACID) CHEW TAB 500 MG 1000 MG: 500 CHEW TAB at 02:11

## 2019-11-10 RX ADMIN — POTASSIUM & SODIUM PHOSPHATES POWDER PACK 280-160-250 MG 2 PACKET: 280-160-250 PACK at 09:11

## 2019-11-10 RX ADMIN — HEPARIN SODIUM 5000 UNITS: 5000 INJECTION, SOLUTION INTRAVENOUS; SUBCUTANEOUS at 09:11

## 2019-11-10 RX ADMIN — RAMELTEON 8 MG: 8 TABLET ORAL at 09:11

## 2019-11-10 RX ADMIN — MAGNESIUM SULFATE IN WATER 2 G: 40 INJECTION, SOLUTION INTRAVENOUS at 02:11

## 2019-11-10 RX ADMIN — MAGNESIUM SULFATE IN WATER 2 G: 40 INJECTION, SOLUTION INTRAVENOUS at 10:11

## 2019-11-10 RX ADMIN — POTASSIUM CHLORIDE 40 MEQ: 20 SOLUTION ORAL at 10:11

## 2019-11-10 RX ADMIN — POTASSIUM & SODIUM PHOSPHATES POWDER PACK 280-160-250 MG 2 PACKET: 280-160-250 PACK at 06:11

## 2019-11-10 RX ADMIN — CALCIUM CARBONATE (ANTACID) CHEW TAB 500 MG 1000 MG: 500 CHEW TAB at 09:11

## 2019-11-10 RX ADMIN — CARVEDILOL 12.5 MG: 12.5 TABLET, FILM COATED ORAL at 09:11

## 2019-11-10 RX ADMIN — PHENOBARBITAL 64.8 MG: 32.4 TABLET ORAL at 09:11

## 2019-11-10 RX ADMIN — CALCIUM CARBONATE (ANTACID) CHEW TAB 500 MG 1000 MG: 500 CHEW TAB at 10:11

## 2019-11-10 RX ADMIN — OXCARBAZEPINE 300 MG: 150 TABLET, FILM COATED ORAL at 09:11

## 2019-11-10 RX ADMIN — LEVOTHYROXINE SODIUM 125 MCG: 25 TABLET ORAL at 05:11

## 2019-11-10 RX ADMIN — METRONIDAZOLE 500 MG: 500 TABLET ORAL at 09:11

## 2019-11-10 RX ADMIN — PRAVASTATIN SODIUM 20 MG: 20 TABLET ORAL at 10:11

## 2019-11-10 RX ADMIN — METRONIDAZOLE 500 MG: 500 TABLET ORAL at 01:11

## 2019-11-10 NOTE — PLAN OF CARE
VN cued into patients room for rounding. VN role explained and informed pt and daughter at bedside that VN will be working alongside the bedside care team throughout the day. Pts daughter verbalized understanding that VN is available for any questions and education, and nurse and PCT will continue hourly rounding at bedside. Pt up in chair; denies pain at this time. Fall education provided. Allotted time given for questions - all questions answered. Will continue to monitor and intervene PRN.

## 2019-11-10 NOTE — PLAN OF CARE
Problem: Physical Therapy Goal  Goal: Physical Therapy Goal  Description  Goals to be met by:      Patient will increase functional independence with mobility by performin. Supine to sit with Moderate Assistance  2. Sit to supine with Moderate Assistance  3. Rolling to Left and Right with Moderate Assistance.  4. Sit to stand transfer with Moderate Assistance using RW  5. Bed to chair transfer with Minimal Assistance and Moderate Assistance using Rolling Walker  6. Gait  x 25 feet with Minimal Assistance and Moderate Assistance using Rolling Walker.   7. Lower extremity exercise program x10 with active BLE with assistance as needed     Outcome: Ongoing, Progressing   Pt participated in PT today w max assist x 2 required for bed mobility and txfs;  pt txfs bed<>chair w max assist x 2 and use of heber stedy and arnaud OOB chair ~ 1 hr;  pt demo significant fatigue after increasing activity today;  rec cont acute therapy progressing as pt able w endurance and mobility independence;  rec SNF upon discharge with ongoing assessment of needs as pt progresses

## 2019-11-10 NOTE — PROGRESS NOTES
[facility]  General Surgery  Progress Note    Subjective:     Interval History:   Feeling well, minimal pain  Ostomy working well  Working with PT some  Julianne reg diet, eating better today  Voice not improving  evaulated by ENT today at bedside, right sided vocal cord paralyzed    Post-Op Info:  Procedure(s) (LRB):  Insertion, Pacemaker, Temporary Transvenous (Right)   11 Days Post-Op      Medications:  Continuous Infusions:  Scheduled Meds:   calcium carbonate  1,000 mg Oral TID    ceFAZolin (ANCEF) IVPB  2 g Intravenous Q12H    epoetin moe-ebpx (RETACRIT) injection  20,000 Units Subcutaneous Every Tues, Thurs, Sat    heparin (porcine)  5,000 Units Subcutaneous Q8H    iron sucrose  100 mg Intravenous Every Mon, Wed, Fri    levothyroxine  125 mcg Oral Before breakfast    magnesium sulfate  2 g Intravenous Q2H    metroNIDAZOLE  500 mg Oral Q8H    OXcarbazepine  300 mg Oral QHS    PHENobarbital  64.8 mg Oral Nightly    potassium, sodium phosphates  2 packet Oral QID (AC & HS)    pravastatin  20 mg Oral Daily     PRN Meds:acetaminophen, albuterol-ipratropium, heparin, porcine (PF), morphine, ondansetron, promethazine (PHENERGAN) IVPB, ramelteon, sodium chloride 0.9%     Objective:     Vital Signs (Most Recent):  Temp: 98.7 °F (37.1 °C) (11/10/19 0832)  Pulse: 92 (11/10/19 0832)  Resp: 18 (11/10/19 0832)  BP: (!) 188/85 (11/10/19 0832)  SpO2: 97 % (11/10/19 0832) Vital Signs (24h Range):  Temp:  [96.2 °F (35.7 °C)-99.9 °F (37.7 °C)] 98.7 °F (37.1 °C)  Pulse:  [82-94] 92  Resp:  [14-18] 18  SpO2:  [92 %-97 %] 97 %  BP: (148-188)/(67-85) 188/85       Intake/Output Summary (Last 24 hours) at 11/10/2019 1103  Last data filed at 11/10/2019 0628  Gross per 24 hour   Intake 500 ml   Output 2500 ml   Net -2000 ml       Physical Exam   Constitutional: She is oriented to person, place, and time.   Cardiovascular: Normal rate.   Pulmonary/Chest: Effort normal. No respiratory distress.   Abdominal:  Soft. There is no tenderness.   Ostomy with good output   Neurological: She is alert and oriented to person, place, and time.       Significant Labs:  CBC:   No results for input(s): WBC, RBC, HGB, HCT, PLT, MCV, MCH, MCHC in the last 48 hours.  CMP:   Recent Labs   Lab 11/10/19  0546      CALCIUM 7.3*   ALBUMIN 2.5*   PROT 5.1*      K 3.4*   CO2 28      BUN 15   CREATININE 0.9   ALKPHOS 77   ALT <5*   AST 28   BILITOT 0.3       Significant Diagnostics:  I have reviewed all pertinent imaging results/findings within the past 24 hours.    Assessment/Plan:     Active Diagnoses:    Diagnosis Date Noted POA    PRINCIPAL PROBLEM:  Colon necrosis [K55.049] 10/27/2019 Yes    Paralysis of right vocal cord [J38.01] 11/09/2019 No    Hoarseness [R49.0] 11/07/2019 No    JAS (acute kidney injury) [N17.9] 11/06/2019 Yes    Acute urinary retention [R33.8] 11/04/2019 Yes    Anemia of acute infection [D64.9] 11/01/2019 No    Enteritis [K52.9]  Yes    E coli bacteremia [R78.81] 10/29/2019 Yes    Abdominal pain [R10.9] 10/29/2019 Yes    Old MI (myocardial infarction) [I25.2] 10/28/2019 Not Applicable     Chronic    Peripheral vascular disease, unspecified [I73.9] 10/28/2019 Yes     Chronic    Slow transit constipation [K59.01] 10/28/2019 Yes     Chronic    Depression [F32.9] 10/17/2019 Yes    History of stent insertion of left renal artery 7/19/17 [Z98.890] 07/19/2017 Not Applicable     Chronic    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, T45.1X5A] 12/27/2016 Yes     Chronic    DLBCL (diffuse large B cell lymphoma) [C83.30] 09/09/2016 Yes     Chronic    Acquired hypothyroidism [E03.9] 08/18/2016 Yes     Chronic    Renovascular hypertension [I15.0] 08/18/2016 Yes     Chronic    Epilepsy [G40.909] 08/18/2016 Yes     Chronic      Problems Resolved During this Admission:    Diagnosis Date Noted Date Resolved POA    Thrombocytopenia [D69.6] 11/01/2019 11/07/2019 No    Lactic acidosis [E87.2]   11/02/2019 Yes    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 coli septic shock [A41.51, R65.21] 10/28/2019 11/02/2019 Yes    Abnormal liver enzymes [R74.8] 10/28/2019 11/02/2019 Yes    Fecal obstruction [K56.41]  11/02/2019 Yes    Chronic hyponatremia [E87.1] 10/12/2016 11/09/2019 Yes     Chronic     84F s/p sigmoid resection with end colostomy  continue abx; per ID stop date for IV cefazolin/metronidazole 11/15/19 (metro switched to po)  PT/OT  Continue regular diet, boost, suplena, encourage PT  Right vocal cord paralysis -Fu ENT - planning CT of neck later after kidney have a few more days to recover      Chris Johnson MD  General Surgery  [facility]

## 2019-11-10 NOTE — ASSESSMENT & PLAN NOTE
E. coli bacteremia  Acute renal failure due to Acute tubular necrosis  DIC (disseminated intravascular coagulation)  Giving cefazolin and metronidazole per ID until 11/15/16. Status post sigmoidectomy. Creatinine decreased to 1.1 and UOP continues to improve. Continues to have renal recovery. Removed trialysis catheter 11/7/19. Appreciate General Surgery, Pulmonology, Cardiology, Nephrology, Hematology-Oncology, and Infectious Disease.

## 2019-11-10 NOTE — PROGRESS NOTES
ID Staff - [facility]   [phone]    Patient seen and examined - lying in bed in sitting position - no dizziness    No complaints except hoarseness    Recap:    83 y/o admitted with long list comorbidities and past illnesses, 10/27 and found to have intra-abdominal source of sepsis due to perforated large bowel and underwent sigmoid colectomy with colostomy formation Oct 29, 2019.  Has done well post-op from ID point of view                 No intra-operative cultures done - on cefazolin and metronidazole empirically and to cover the E coli that grew from blood culture                Antibiotics:  PO metronidazole -   IV cefazolin - can be changed to po cephalexin at dose of 500mg Q 8 hours (creat clearance 34)     Meds - scheduled:     calcium carbonate  1,000 mg Oral TID    ceFAZolin (ANCEF) IVPB  2 g Intravenous Q12H    epoetin moe-ebpx (RETACRIT) injection  20,000 Units Subcutaneous Every Tues, Thurs, Sat    heparin (porcine)  5,000 Units Subcutaneous Q8H    iron sucrose  100 mg Intravenous Every Mon, Wed, Fri    levothyroxine  125 mcg Oral Before breakfast    magnesium sulfate  2 g Intravenous Q2H    metroNIDAZOLE  500 mg Oral Q8H    OXcarbazepine  300 mg Oral QHS    PHENobarbital  64.8 mg Oral Nightly    potassium chloride 10%  40 mEq Oral Once    potassium, sodium phosphates  2 packet Oral QID (AC & HS)    pravastatin  20 mg Oral Daily     Exam:  BP (!) 188/85   Pulse 92   Temp 98.7 °F (37.1 °C)   Resp 18   Ht 5' (1.524 m)   Wt 68.1 kg (150 lb 2.1 oz)   SpO2 97%   Breastfeeding? No   BMI 29.32 kg/m²    Alert -   Skin - superficial contusions as before - no rash  Mouth - clear  Heart - RRR, 2/6 BOLA unchanged  Abd:  Soft; slightly tender    Labs:  Only getting chemistries - - alb continues to improve (now 2.5)    Recent Labs   Lab 11/10/19  0546      K 3.4*      CO2 28   BUN 15   CREATININE 0.9   MG 1.2*       IMP and REC:    Assuming she continues to do well - course is 14 days  from first negative blood culture (11/01) - stop date is Nov 15    Cefazolin can be changed to PO (cephalexin 500mg Q 8 hours)   Continue metronidazole 500mg po Q 8 hr

## 2019-11-10 NOTE — PROGRESS NOTES
Plan of care reviewed with patient, understanding verbalized. Pt remains SR on tele.No complaints of pain or acute distress noted. Weight shift assistance provided. Martinez patent draining yellow urine. Colostomy C/D/I. Instructed to call for any assistance, understanding verbalized.  Bed alarm on, call light in reach, fall precautions in place. Will continue to monitor.

## 2019-11-10 NOTE — PT/OT/SLP PROGRESS
Physical Therapy Treatment    Patient Name:  Annette Garcia   MRN:  [id number]    Recommendations:     Discharge Recommendations:  nursing facility, skilled   Discharge Equipment Recommendations: (TBD)   Barriers to discharge: Decreased caregiver support;  Pt requiring higher level of care needs    Assessment:     Annette Garcia is a 84 y.o. female admitted with a medical diagnosis of Colon necrosis.  She presents with the following impairments/functional limitations:  weakness, impaired endurance, impaired self care skills, decreased lower extremity function, decreased ROM, impaired balance, decreased upper extremity function, impaired skin, impaired fine motor, decreased coordination, pain, gait instability, impaired functional mobilty, decreased safety awareness, impaired cardiopulmonary response to activity.  Pt participated in PT today w max assist x 2 required for bed mobility and txfs;  pt txfs bed<>chair w max assist x 2 and use of heber stedy and arnaud OOB chair ~ 1 hr;  pt demo significant fatigue after increasing activity today;  rec cont acute therapy progressing as pt able w endurance and mobility independence;  rec SNF upon discharge with ongoing assessment of needs as pt progresses    Rehab Prognosis: Fair; patient would benefit from acute skilled PT services to address these deficits and reach maximum level of function.    Recent Surgery: Procedure(s) (LRB):  Insertion, Pacemaker, Temporary Transvenous (Right) 11 Days Post-Op    Plan:     During this hospitalization, patient to be seen 6 x/week to address the identified rehab impairments via gait training, therapeutic activities, therapeutic exercises, neuromuscular re-education, wheelchair management/training and progress toward the following goals:    · Plan of Care Expires:  12/01/19    Subjective     Chief Complaint: c/o significant nausea throughout but agreed to participate and rec'd meds from [facility] prior to session starting;  Requested OOB  "chair multiple times by both pt and her daughter;  Significant c/o fatigue after seated OOB  Chair activity;  No dizziness reported;  Pt c/o pain during LLE flexion at "pelvis" but no c/o as soon as stopped ex performance;  Pt grimacing upon return to bed and positioning after OOB in afternoon but reporting no specific pain when questioned  Patient/Family Comments/goals: PLOF  Pain/Comfort:  · Pain Rating 1: other (see comments)(pt reported pain at "pelvis" w L LE flex sup ex attempts, resolved upon stopping)  · Location - Side 1: Left  · Location - Orientation 1: generalized  · Location 1: hip  · Pain Addressed 1: Reposition, Cessation of Activity  · Pain Rating Post-Intervention 1: 0/10      Objective:     Communicated with nsg prior to session.  Patient found supine with bed alarm, blood pressure cuff, gonzales catheter, telemetry, central line, pressure relief boots, colostomy upon PT entry to room.     General Precautions: Standard, fall, seizure   Orthopedic Precautions:N/A   Braces: N/A     Functional Mobility:  · Bed mobility:  B rolling max assist;  Sup scoot Dep x 2  · Txfs:  Sup<>sit max assist x 2;   Sit <>stand w max assist x 2 HHA either side of pt to achieve ~75% upright stance and unable to take steps;  Sit<>stance and partial stance supported from elevated bed surface w heber stedy x 2 assist for safety and w lines;  OOB chair>stand w heber stedy max assist x 2;  txfs bed<>OOB chair dep w use of heber stedy and x3 assist for safety w lines and providing ed to nsg staff each time for safe technique  · Static sit EOB x 8 minutes w progression from mod assist to CGA w facilitation increased trunk extension and cervical extension eyes to horizon w activity to improve upright posture and midline orientation w pt tendency to L side bend and R rotate cerv spine at resting positions;  Heber stedy supported partial stance x 30 seconds x 4 trials;  Pt perf BLE therex x 5 reps in AAROM/AROM seated EOB w mod assist " "balance; intermittent Increased assist in static sit balance req'd as pt fatigued after ~ 4-5 minutes of trial      AM-PAC 6 CLICK MOBILITY  Turning over in bed (including adjusting bedclothes, sheets and blankets)?: 2  Sitting down on and standing up from a chair with arms (e.g., wheelchair, bedside commode, etc.): 2  Moving from lying on back to sitting on the side of the bed?: 2  Moving to and from a bed to a chair (including a wheelchair)?: 2  Need to walk in hospital room?: 1  Climbing 3-5 steps with a railing?: 1  Basic Mobility Total Score: 10       Therapeutic Activities and Exercises:   safety ed for mobility technique, AD use and PT POC provided throughout session;  *pt seen 3 visits: with perf supine therex BUE x 8 reps D2 PNF pattern and shoulder flex to arnaud and hand/finger/wrist ex, pt perf x 8 reps AAROM ex hip/knee flex, abd/add slides, AP, toes allowing rests between each set, perf bed mobility training;  Upon later return, perf bed mobility training, nsg assist bath and hygiene care due to leaking stool from anus (colostomy bag intact), txf training, posture simona seated and in stance w balance and endurance work w sit EOB and stance w heber stedy use for ~30 min activity w pt demo fatigue despite mult rests provided but pt determined to sit OOB chair so left w OOB chair w all lines intact and daughter to remain present in room w pt while up;  Upon return after ~1hr 5 min, pt reporting very fatigued and wanting to return to bed stating " do I have to use that?" pointing to heber stedy but when asked about other options as ed provided for safety and inability to stand at this time without assist, pt shrugged and agreed; pt demo limited safety awareness of overall situation and endurance/strength levels when requesting activities w family encouragement;  Pt return to Right side lying w pillow supported dep positioning and air mattress and all lines intact w assist x 3 for safe management of txf OOB chair " to bed w chair>stand max assist x 2 and sit>sup txf dependent x 3 and sup scoot dep x 2; pt resting comfortably upon completion of final portion of sessions.    Patient left right sidelying with all lines intact, call button in reach, bed alarm on, nsg notified and daughter  present..    GOALS:   Multidisciplinary Problems     Physical Therapy Goals        Problem: Physical Therapy Goal    Goal Priority Disciplines Outcome Goal Variances Interventions   Physical Therapy Goal     PT, PT/OT Ongoing, Progressing     Description:  1.  Roll bilaterally with supervision  2.  Supine to/from sit with supervision  3.  Bed to/from chair with CG  4.  Ambulate 50' with RW with CG  5.  Sit in chair 2 hours           Problem: Physical Therapy Goal    Goal Priority Disciplines Outcome Goal Variances Interventions   Physical Therapy Goal     PT, PT/OT Ongoing, Progressing     Description:  Goals to be met by:      Patient will increase functional independence with mobility by performin. Supine to sit with Moderate Assistance  2. Sit to supine with Moderate Assistance  3. Rolling to Left and Right with Moderate Assistance.  4. Sit to stand transfer with Moderate Assistance using RW  5. Bed to chair transfer with Minimal Assistance and Moderate Assistance using Rolling Walker  6. Gait  x 25 feet with Minimal Assistance and Moderate Assistance using Rolling Walker.   7. Lower extremity exercise program x10 with active BLE with assistance as needed                      Time Tracking:     PT Received On: 11/10/19  PT Start Time: 1116     PT Stop Time: 1418  PT Total Time (min): 182 min    Perf in 3 portions:  7756-7150, 4636-9193, 7467-0840 = 72 min total    Billable Minutes: Therapeutic Activity 56 and Therapeutic Exercise 16    Treatment Type: Treatment  PT/PTA: PT     PTA Visit Number: 0     Liliana Perdomo, PT  11/10/2019

## 2019-11-10 NOTE — PROGRESS NOTES
[facility] Hospital Medicine  Progress Note    Patient Name: Annette Garcia  MRN: [id number]  Patient Class: IP- Inpatient   Admission Date: 10/27/2019  Length of Stay: 12 days  Attending Physician: Ryan Espinosa MD  Primary Care Provider: Jose Valles MD        Subjective:     Principal Problem:Colon necrosis        HPI:  Annette Garcia is an 84 year old white woman with peripheral vascular disease, renovascular hypertension, renal artery stenosis status post left renal artery stent placement on 7/19/17, coronary artery disease with history of myocardial infarction, diffuse large B cell lymphoma, anemia, epilepsy, hypothyroidism, chronic hyponatremia, depression, lumbar and sacroiliac joint osteoarthritis with chronic low back pain and history of lumbar spine surgery in 2013, slow transit constipation, history of appendectomy, history of cholecystectomy history hysterectomy, history of small bowel obstruction status post small bowel resection on 8/23/16. She lives in [place], [place]. Her son has epilepsy. Her daughter has tuberous sclerosis. Her primary care physician is Dr. Jose Torres. Her pain management specialist is Dr. Chirag Bates. Her neurologist is Dr. Gamal Lock.    She presented to [facility] on 10/27/19 with abdominal pain, difficulty urinating, and constipation for one day. She took polyethylene glycol and senna-docusate without relief. She strained during her last attempt at having a bowel movement. She had one episode of vomiting on the day of presentation. She felt weak when walking. In the emergency department, she was found to have acute kidney injury (BUN 27, creatinine 1.6, from 9 and 0.7 on 8/29/19), elevated transaminases ( and , from 16 and 11 on 8/29/19), and lactic acidosis (5.8). Contrast CT showed acute enteritis, sigmoid and rectal constipation, and some peritoneal free fluid in the pericolic  magdy.she was given lactated ringers, cefepime, and metronidazole. She required norepinephrine for septic shock. She was admitted to [facility].    Overview/Hospital Course:  Blood cultures grew pan-sensitive Escherichia coli. Due to history of bowel resection for obstruction, General Surgery was consulted for her bowel obstruction. She was put on amikacin and cefepime. 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 She was kept intubated postoperatively. Hospital Medicine changed amikacin to metronidazole, then later stopped metronidazole and changed cefepime to meropenem. She was given total parenteral nutrition. She became oliguric but responded well to a dose of furosemide, so Nephrology gave her doses of bumetanide. She developed thrombocytopenia. She developed atrial fibrillation and sinus pauses, so Cardiology was consulted and placed a temporary transvenous pacemaker. Echocardiogram only showed concentric remodeling and mild mitral regurgitation. Norepinephrine was changed to dopamine. EEG showed triphasic waves bilaterally consistent with metabolic encephalopathy. The epileptologist recommended treating her hyponatremia. She required pRBC transfusion. Nephrology improved her hyponatremia, and encephalopathy resolved. She was weaned off vasopressors and was extubated the morning of 11/2/19. Surgery stopped TPN and started full liquid diet on 11/3/19. Ertapenem was changed to cefazolin but Infectious Disease restarted metronidazole to continue empiric anaerobe coverage. Cardiology removed her temporary pacemaker on 11/4/19.     Interval History: Eating a little more each day, but still not eating much. Drinking a couple of boost each day. No nausea. Mild abdominal pain    Review of Systems   Constitutional: Negative for chills and fever.   Respiratory: Negative for cough and shortness of breath.    Cardiovascular:  Negative for chest pain and palpitations.   Gastrointestinal: Negative for nausea and vomiting.     Objective:     Vital Signs (Most Recent):  Temp: 99.9 °F (37.7 °C) (11/09/19 1730)  Pulse: 92 (11/09/19 1730)  Resp: 18 (11/09/19 1730)  BP: (!) 163/76 (11/09/19 1730)  SpO2: (!) 94 % (11/09/19 1730) Vital Signs (24h Range):  Temp:  [98.7 °F (37.1 °C)-99.9 °F (37.7 °C)] 99.9 °F (37.7 °C)  Pulse:  [81-94] 92  Resp:  [16-18] 18  SpO2:  [93 %-96 %] 94 %  BP: (111-169)/(53-76) 163/76     Weight: 68.1 kg (150 lb 2.1 oz)  Body mass index is 29.32 kg/m².    Intake/Output Summary (Last 24 hours) at 11/9/2019 1734  Last data filed at 11/9/2019 1047  Gross per 24 hour   Intake 450 ml   Output 1175 ml   Net -725 ml      Physical Exam   Constitutional: She is oriented to person, place, and time. She appears well-developed and well-nourished. No distress. Nasal cannula in place.   Cardiovascular: Normal rate and regular rhythm.   No murmur heard.  Pulmonary/Chest: Effort normal and breath sounds normal. No respiratory distress. She has no wheezes.   Abdominal: Soft. Bowel sounds are normal. She exhibits no distension. There is tenderness.   Musculoskeletal: She exhibits edema.   Neurological: She is alert and oriented to person, place, and time.   Skin: Ecchymosis noted.   Psychiatric: She has a normal mood and affect. Her behavior is normal.   Nursing note and vitals reviewed.      Significant Labs:   CBC:   Recent Labs   Lab 11/08/19 0622   WBC 8.08   HGB 7.0*   HCT 22.0*        CMP:   Recent Labs   Lab 11/08/19 0622 11/09/19  0539    141   K 3.6 3.3*    106   CO2 24 26   * 124*   BUN 36* 24*   CREATININE 1.4 1.1   CALCIUM 6.6* 6.8*   PROT 4.7* 5.0*   ALBUMIN 2.2* 2.4*   BILITOT 0.3 0.3   ALKPHOS 87 87   AST 39 30   ALT 5* 5*   ANIONGAP 8 9   EGFRNONAA 35* 47*     Magnesium:   Recent Labs   Lab 11/08/19  0622 11/09/19  0539   MG 1.4* 1.9       Significant Imaging: I have reviewed all pertinent imaging  results/findings within the past 24 hours.      Assessment/Plan:      * Colon necrosis  E. coli bacteremia  Acute renal failure due to Acute tubular necrosis  DIC (disseminated intravascular coagulation)  Giving cefazolin and metronidazole per ID until 11/15/16. Status post sigmoidectomy. Creatinine decreased to 1.1 and UOP continues to improve. Continues to have renal recovery. Removed trialysis catheter 11/7/19. Appreciate General Surgery, Pulmonology, Cardiology, Nephrology, Hematology-Oncology, and Infectious Disease.     Hoarseness  Right vocal cord paralysis  Appreciate ENT evaluation.  Consult SLP for evaluation of voice.   ENT requests CT neck and chest with IV contrast, but will hold off on that for right now as kidneys continue to recover.        Acute urinary retention  Martinez in place.   Follow up with Urology as outpatient.       Anemia of acute infection  Transfused pRBCs.    Slow transit constipation  Takes polyethylene glycol at home.    Peripheral vascular disease, unspecified  Hyperlipidemia   Takes pravastatin.    Old MI (myocardial infarction)  Takes pravastatin.    Depression  Continue home mirtazapine 7.5mg HS.    Anemia due to antineoplastic chemotherapy  Thrombocytopenia  Stable.  Continue to monitor.     DLBCL (diffuse large B cell lymphoma)  Follow up outpatient.    Acquired hypothyroidism  Continue home levothyroxine 125 mcg before breakfast.  Lab Results   Component Value Date    TSH 0.507 11/01/2019       Epilepsy  Continue home oxycarbazepine 300 mg nightly, phenobarbital 64.8 mg nightly.    Renovascular hypertension  Holding home lisinopril, clonidine, carvedilol.  SBP ranged 111 to 171   Continue to monitor off her home medications.       VTE Risk Mitigation (From admission, onward)         Ordered     heparin (porcine) 1,000 unit/mL injection      11/04/19 1534     heparin (porcine) injection 5,000 Units  Every 8 hours      11/02/19 1447     heparin, porcine (PF) 100 unit/mL injection  flush 1,000 Units  As needed (PRN)      11/02/19 2049     IP VTE LOW RISK PATIENT  Once      10/30/19 0811     Place sequential compression device  Until discontinued      10/28/19 0607                      Ryan Espinosa MD  Department of Hospital Medicine   [facility]

## 2019-11-10 NOTE — PLAN OF CARE
Plan of care reviewed with patient. Patient verbalized understanding. Fall precautions maintained. Bed in lowest position, call light within reach, 2x bed rails, pt wearing slip resistant socks, staples to midline abd intact, colostomy to LLQ intact, clean,dry. Patient notified to ask staff for assistance and pt verbalized complete understanding. Pt on telemetry with no ectopy noted. Will continue to monitor.

## 2019-11-10 NOTE — ASSESSMENT & PLAN NOTE
Holding home lisinopril, clonidine, carvedilol.  SBP ranged 111 to 171   Continue to monitor off her home medications.

## 2019-11-11 PROBLEM — N17.9 AKI (ACUTE KIDNEY INJURY): Status: RESOLVED | Noted: 2019-11-06 | Resolved: 2019-11-11

## 2019-11-11 LAB
ANION GAP SERPL CALC-SCNC: 7 MMOL/L (ref 8–16)
BUN SERPL-MCNC: 11 MG/DL (ref 8–23)
CALCIUM SERPL-MCNC: 7.3 MG/DL (ref 8.7–10.5)
CHLORIDE SERPL-SCNC: 103 MMOL/L (ref 95–110)
CO2 SERPL-SCNC: 29 MMOL/L (ref 23–29)
CREAT SERPL-MCNC: 0.8 MG/DL (ref 0.5–1.4)
ERYTHROCYTE [DISTWIDTH] IN BLOOD BY AUTOMATED COUNT: 17.5 % (ref 11.5–14.5)
EST. GFR  (AFRICAN AMERICAN): >60 ML/MIN/1.73 M^2
EST. GFR  (NON AFRICAN AMERICAN): >60 ML/MIN/1.73 M^2
GLUCOSE SERPL-MCNC: 117 MG/DL (ref 70–110)
HCT VFR BLD AUTO: 24.9 % (ref 37–48.5)
HGB BLD-MCNC: 7.6 G/DL (ref 12–16)
MAGNESIUM SERPL-MCNC: 1.8 MG/DL (ref 1.6–2.6)
MCH RBC QN AUTO: 32.6 PG (ref 27–31)
MCHC RBC AUTO-ENTMCNC: 30.5 G/DL (ref 32–36)
MCV RBC AUTO: 107 FL (ref 82–98)
PHOSPHATE SERPL-MCNC: 2.5 MG/DL (ref 2.7–4.5)
PLATELET # BLD AUTO: 277 K/UL (ref 150–350)
PLATELET BLD QL SMEAR: ABNORMAL
PMV BLD AUTO: 10.3 FL (ref 9.2–12.9)
POTASSIUM SERPL-SCNC: 3.7 MMOL/L (ref 3.5–5.1)
RBC # BLD AUTO: 2.33 M/UL (ref 4–5.4)
SODIUM SERPL-SCNC: 139 MMOL/L (ref 136–145)
WBC # BLD AUTO: 5.25 K/UL (ref 3.9–12.7)

## 2019-11-11 PROCEDURE — 94761 N-INVAS EAR/PLS OXIMETRY MLT: CPT

## 2019-11-11 PROCEDURE — 63600175 PHARM REV CODE 636 W HCPCS: Performed by: HOSPITALIST

## 2019-11-11 PROCEDURE — 36415 COLL VENOUS BLD VENIPUNCTURE: CPT

## 2019-11-11 PROCEDURE — 25000003 PHARM REV CODE 250: Performed by: HOSPITALIST

## 2019-11-11 PROCEDURE — 83735 ASSAY OF MAGNESIUM: CPT

## 2019-11-11 PROCEDURE — 25000003 PHARM REV CODE 250: Performed by: NURSE PRACTITIONER

## 2019-11-11 PROCEDURE — 11000001 HC ACUTE MED/SURG PRIVATE ROOM

## 2019-11-11 PROCEDURE — 97530 THERAPEUTIC ACTIVITIES: CPT

## 2019-11-11 PROCEDURE — 84100 ASSAY OF PHOSPHORUS: CPT

## 2019-11-11 PROCEDURE — 97535 SELF CARE MNGMENT TRAINING: CPT

## 2019-11-11 PROCEDURE — 85027 COMPLETE CBC AUTOMATED: CPT

## 2019-11-11 PROCEDURE — 25000003 PHARM REV CODE 250: Performed by: INTERNAL MEDICINE

## 2019-11-11 PROCEDURE — 97110 THERAPEUTIC EXERCISES: CPT

## 2019-11-11 PROCEDURE — 80048 BASIC METABOLIC PNL TOTAL CA: CPT

## 2019-11-11 RX ORDER — CARVEDILOL 25 MG/1
25 TABLET ORAL 2 TIMES DAILY
Status: DISCONTINUED | OUTPATIENT
Start: 2019-11-11 | End: 2019-11-12 | Stop reason: HOSPADM

## 2019-11-11 RX ORDER — CEFAZOLIN SODIUM 2 G/50ML
2 SOLUTION INTRAVENOUS
Status: DISCONTINUED | OUTPATIENT
Start: 2019-11-11 | End: 2019-11-12

## 2019-11-11 RX ORDER — CLONIDINE HYDROCHLORIDE 0.1 MG/1
0.1 TABLET ORAL 2 TIMES DAILY
Status: DISCONTINUED | OUTPATIENT
Start: 2019-11-11 | End: 2019-11-12 | Stop reason: HOSPADM

## 2019-11-11 RX ORDER — FLUCONAZOLE 100 MG/1
100 TABLET ORAL DAILY
Status: DISCONTINUED | OUTPATIENT
Start: 2019-11-12 | End: 2019-11-12 | Stop reason: HOSPADM

## 2019-11-11 RX ORDER — MICONAZOLE NITRATE 2 %
POWDER (GRAM) TOPICAL 2 TIMES DAILY
Status: DISCONTINUED | OUTPATIENT
Start: 2019-11-11 | End: 2019-11-12 | Stop reason: HOSPADM

## 2019-11-11 RX ORDER — CEPHALEXIN 500 MG/1
500 CAPSULE ORAL 4 TIMES DAILY
Status: DISCONTINUED | OUTPATIENT
Start: 2019-11-12 | End: 2019-11-12 | Stop reason: HOSPADM

## 2019-11-11 RX ADMIN — PHENOBARBITAL 64.8 MG: 32.4 TABLET ORAL at 08:11

## 2019-11-11 RX ADMIN — CEFAZOLIN SODIUM 2 G: 2 SOLUTION INTRAVENOUS at 04:11

## 2019-11-11 RX ADMIN — CEFAZOLIN SODIUM 2 G: 2 SOLUTION INTRAVENOUS at 12:11

## 2019-11-11 RX ADMIN — METRONIDAZOLE 500 MG: 500 TABLET ORAL at 09:11

## 2019-11-11 RX ADMIN — HEPARIN SODIUM 5000 UNITS: 5000 INJECTION, SOLUTION INTRAVENOUS; SUBCUTANEOUS at 02:11

## 2019-11-11 RX ADMIN — OXCARBAZEPINE 300 MG: 150 TABLET, FILM COATED ORAL at 08:11

## 2019-11-11 RX ADMIN — MICONAZOLE NITRATE: 20 POWDER TOPICAL at 09:11

## 2019-11-11 RX ADMIN — CALCIUM CARBONATE (ANTACID) CHEW TAB 500 MG 1000 MG: 500 CHEW TAB at 08:11

## 2019-11-11 RX ADMIN — HEPARIN SODIUM 5000 UNITS: 5000 INJECTION, SOLUTION INTRAVENOUS; SUBCUTANEOUS at 05:11

## 2019-11-11 RX ADMIN — CARVEDILOL 25 MG: 25 TABLET, FILM COATED ORAL at 08:11

## 2019-11-11 RX ADMIN — PRAVASTATIN SODIUM 20 MG: 20 TABLET ORAL at 08:11

## 2019-11-11 RX ADMIN — METRONIDAZOLE 500 MG: 500 TABLET ORAL at 02:11

## 2019-11-11 RX ADMIN — HEPARIN SODIUM 5000 UNITS: 5000 INJECTION, SOLUTION INTRAVENOUS; SUBCUTANEOUS at 09:11

## 2019-11-11 RX ADMIN — MICONAZOLE NITRATE: 20 POWDER TOPICAL at 12:11

## 2019-11-11 RX ADMIN — LEVOTHYROXINE SODIUM 125 MCG: 25 TABLET ORAL at 05:11

## 2019-11-11 RX ADMIN — IRON SUCROSE 100 MG: 20 INJECTION, SOLUTION INTRAVENOUS at 08:11

## 2019-11-11 RX ADMIN — CARVEDILOL 12.5 MG: 12.5 TABLET, FILM COATED ORAL at 08:11

## 2019-11-11 RX ADMIN — CEFAZOLIN SODIUM 2 G: 2 SOLUTION INTRAVENOUS at 11:11

## 2019-11-11 RX ADMIN — METRONIDAZOLE 500 MG: 500 TABLET ORAL at 05:11

## 2019-11-11 RX ADMIN — CLONIDINE HYDROCHLORIDE 0.1 MG: 0.1 TABLET ORAL at 05:11

## 2019-11-11 RX ADMIN — CALCIUM CARBONATE (ANTACID) CHEW TAB 500 MG 1000 MG: 500 CHEW TAB at 02:11

## 2019-11-11 RX ADMIN — CEFAZOLIN SODIUM 2 G: 2 SOLUTION INTRAVENOUS at 09:11

## 2019-11-11 NOTE — PROGRESS NOTES
The Sw spoke to Ashley at [facility] and she has no beds today and no discharges scheduled for tomorrow either. The Sw met with the pt's family and notified them of this info. The pt's family insisted the pt's not ready for d/c today b/c she's still pending some test and results. The Sw again addressed [facility] has no beds today and none for tomorrow. The family states they understand Annamarie at [facility] will be contacting them to sign admit papers b/c [facility] has no beds. The pt's family acknowledged understand but stated again the pt's not ready for d/c yet. The Sw explained to them the d/c process starts the moment the pt enters the hospital and the pt's family acknowledged understanding the d/c process. The Sw also explained to them insurance will not allow the pt to stay in the hospital waiting for a bed at [facility] if [facility] accepts and they acknowledged understanding. The Sw gave them her contact info again and encouraged them to call should they have any further questions or concerns.

## 2019-11-11 NOTE — PLAN OF CARE
Plan of care reviewed with patient and family. Patient verbalized understanding. Fall precautions maintained. Bed in lowest position, call light within reach, 2x bed rails, pt wearing slip resistant socks, pt turned q2hr, colostomy bag changed CDI, staples to midline abd intact, mepilex on sacrum for pressure injury, gonzales to gravity. Patient notified to ask staff for assistance and pt verbalized complete understanding. Pt on telemetry with no ectopy noted. Will continue to monitor.

## 2019-11-11 NOTE — SUBJECTIVE & OBJECTIVE
Interval History: Sitting up in chair this afternoon, but is having some nausea after getting up. First time to chair since admission. Denies any abdominal pain. Ate a little more breakfast this AM.     Review of Systems   Constitutional: Negative for fever.   Respiratory: Negative for cough and shortness of breath.    Cardiovascular: Negative for chest pain and palpitations.   Gastrointestinal: Positive for nausea. Negative for vomiting.     Objective:     Vital Signs (Most Recent):  Temp: 99.8 °F (37.7 °C) (11/10/19 1145)  Pulse: 85 (11/10/19 1600)  Resp: 20 (11/10/19 1145)  BP: (!) 158/70 (11/10/19 1210)  SpO2: 95 % (11/10/19 1145) Vital Signs (24h Range):  Temp:  [96.2 °F (35.7 °C)-99.8 °F (37.7 °C)] 99.8 °F (37.7 °C)  Pulse:  [82-97] 85  Resp:  [14-20] 20  SpO2:  [92 %-97 %] 95 %  BP: (148-188)/(67-85) 158/70     Weight: 68.1 kg (150 lb 2.1 oz)  Body mass index is 29.32 kg/m².    Intake/Output Summary (Last 24 hours) at 11/10/2019 1849  Last data filed at 11/10/2019 1539  Gross per 24 hour   Intake 490 ml   Output 2350 ml   Net -1860 ml      Physical Exam   Constitutional: She is oriented to person, place, and time. She appears well-developed and well-nourished. No distress.   Cardiovascular: Normal rate and regular rhythm.   No murmur heard.  Pulmonary/Chest: Effort normal and breath sounds normal. No respiratory distress. She has no wheezes.   Abdominal: Soft. Bowel sounds are normal. She exhibits no distension. There is tenderness.   Musculoskeletal: She exhibits edema.   Neurological: She is alert and oriented to person, place, and time.   Skin: Ecchymosis noted.   Psychiatric: She has a normal mood and affect. Her behavior is normal.   Nursing note and vitals reviewed.      Significant Labs:   CMP:   Recent Labs   Lab 11/09/19  0539 11/10/19  0546    139   K 3.3* 3.4*    105   CO2 26 28   * 105   BUN 24* 15   CREATININE 1.1 0.9   CALCIUM 6.8* 7.3*   PROT 5.0* 5.1*   ALBUMIN 2.4* 2.5*    BILITOT 0.3 0.3   ALKPHOS 87 77   AST 30 28   ALT 5* <5*   ANIONGAP 9 6*   EGFRNONAA 47* 60     Magnesium:   Recent Labs   Lab 11/09/19  0539 11/10/19  0546   MG 1.9 1.2*       Significant Imaging: I have reviewed all pertinent imaging results/findings within the past 24 hours.

## 2019-11-11 NOTE — ASSESSMENT & PLAN NOTE
Holding home lisinopril, clonidine, carvedilol.  SBP ranged 148 to 171   Continue to monitor.  Resume carvedilol 12.5 mg BID (1/2 home dose) this evening.

## 2019-11-11 NOTE — PLAN OF CARE
11/11/19 0946   Post-Acute Status   Post-Acute Authorization Placement   Post-Acute Placement Status Pending Post-Acute Medical Review   The sw let a message for Ashley at [facility] to return the call in reference to this pt. The Sw spoke to Annamarie at [facility] and she states to sukhdev her if [facility] doesn't accept the pt. She states the pt's family toured their facility last week.

## 2019-11-11 NOTE — PT/OT/SLP PROGRESS
"Speech Language Pathology  MISSED VISIT    Annette Garcia  MRN: [id number]    Patient not seen today secondary to 1st attempt: pt being bathed and 2nd attempt: Patient unwilling to participate. On second attempt, pt stated, "I can't till I get back in bed..I'm so tired."  Max encouragement provided to participate though pt continued to pleasantly refuse tx. Will follow-up next date of service.    Pauline Pugh, CCC-SLP    "

## 2019-11-11 NOTE — PT/OT/SLP PROGRESS
Occupational Therapy  Visit Attempt    Patient Name:  Annette Garcia   MRN:  [id number]  AM 10;12  Patient not seen today secondary to pt being bathed. Will follow-up as time allows.    PREETHI Kim  11/11/2019

## 2019-11-11 NOTE — PHYSICIAN QUERY
PT Name: Annette Garcia  MR #: [id number]     Physician Query Form - Documentation Clarification      CDS: Little DHALIWAL,RN        Contact information:[phone]    This form is a permanent document in the medical record.     Query Date: November 11, 2019    By submitting this query, we are merely seeking further clarification of documentation. Please utilize your independent clinical judgment when addressing the question(s) below.    The Medical record reflects the following:    Supporting Clinical Findings Location in Medical Record   No evidence of perforation as of right now. Will continue to monitor.     Preoperative Diagnosis:  Acute abdomen  Procedure:  Exploratory laparotomy, sigmoid colectomy, end colostomy  Postoperative Diagnosis: bowel necrosis of sigmoid colon without obvious volvulus, obstruction, or perforation    Impression    1. Long segment abnormal small bowel wall thickening with increased mucosal enhancement consistent with acute enteritis.  No evidence of abnormal small bowel dilatation or obstruction.  2. Prominent retained stool seen throughout the colon with large volume stool seen within the sigmoid colon and rectum.  Correlate for possible constipation and fecal impaction.  3. Small volume peritoneal free fluid seen along the bilateral pericolic gutters.     10/28/19 Surgery Consults            10/29/19 General Surgery  Op Note                    10/27/19 CT Abdomen Pelvis with Contrast   Add flagyl empirically since she had intraabdominal perforation                                                                Add flagyl empirically since she had intraabdominal perforation  Patient was taken to the OR on 10/29      and underwent sigmoid colectomy with      end colostomy for findings of sigmoid         necrosis and large amounts of turbid fluid in abdomen.           11/3/19 Infectious Diseases Assessment & Plan Note         11/3/19 Infectious Diseases Progress Notes                                                                                Please clarify the conflicting documentation regarding perforation of bowel status during this admission:    Provider Use Only     [  x   ]  Bowel perforation was present and pertinent during this admission     [     ]  Bowel perforation was not present or pertinent during this admission     [     ]  Other (Please Specify)_________________________________________                                                                                                               [  ] Clinically Undetermined

## 2019-11-11 NOTE — PHYSICIAN QUERY
PT Name: Annette Garcia  MR #: [id number]    Physician Query Form - Consultant Diagnosis Clarification     CDS: Little DHALIWAL,RN        Contact information:[phone]  This form is a permanent document in the medical record.     Query Date: November 11, 2019      By submitting this query, we are merely seeking further clarification of documentation.  Please utilize your independent clinical judgment when addressing the question(s) below.      The Medical record contains the following:   Diagnosis Supporting Clinical Information Location in Medical Record             Hyperkalemia       Potassium= 4.6-->4.8-->5.0-->3.7-->3.4-->3.7    Now with gonzales UO 2.4L per day Cr improving, acidosis and hyperkalemia resolved    Now with gonzales with excellent UO Cr improving, acidosis and hyperkalemia resolved    Continue CRRT; appreciate nephrology assistance         10/27---->11/11 Labs    11/7/19  Nephrology Progress Notes    11/8/19 Nephrology Progress Notes      11/6/19 General Surgery Assessment & Plan Note         Do you agree with the Consultants diagnosis of ___hyperkalemia________________________________?    [  ] Yes   [ x ] No   [  ] Clinically insignificant   [  ] Other/Clarification of findings:   [  ] Clinically undetermined

## 2019-11-11 NOTE — PROGRESS NOTES
[facility]  Progress Note    Patient Name: Annette Garcia  MRN: [id number]  Patient Class: IP- Inpatient   Admission Date: 10/27/2019  Length of Stay: 13 days  Attending Physician: Ryan Espinosa MD  Primary Care Provider: Jose Valles MD        Subjective:     Principal Problem:Colon necrosis        HPI:  Annette Garcia is an 84 year old white woman with peripheral vascular disease, renovascular hypertension, renal artery stenosis status post left renal artery stent placement on 7/19/17, coronary artery disease with history of myocardial infarction, diffuse large B cell lymphoma, anemia, epilepsy, hypothyroidism, chronic hyponatremia, depression, lumbar and sacroiliac joint osteoarthritis with chronic low back pain and history of lumbar spine surgery in 2013, slow transit constipation, history of appendectomy, history of cholecystectomy history hysterectomy, history of small bowel obstruction status post small bowel resection on 8/23/16. She lives in [place], [place]. Her son has epilepsy. Her daughter has tuberous sclerosis. Her primary care physician is Dr. Jose Torres. Her pain management specialist is Dr. Chirag Bates. Her neurologist is Dr. Gamal Lock.    She presented to [facility] on 10/27/19 with abdominal pain, difficulty urinating, and constipation for one day. She took polyethylene glycol and senna-docusate without relief. She strained during her last attempt at having a bowel movement. She had one episode of vomiting on the day of presentation. She felt weak when walking. In the emergency department, she was found to have acute kidney injury (BUN 27, creatinine 1.6, from 9 and 0.7 on 8/29/19), elevated transaminases ( and , from 16 and 11 on 8/29/19), and lactic acidosis (5.8). Contrast CT showed acute enteritis, sigmoid and rectal constipation, and some peritoneal free fluid in the pericolic  magdy.she was given lactated ringers, cefepime, and metronidazole. She required norepinephrine for septic shock. She was admitted to [facility].    Overview/Hospital Course:  Blood cultures grew pan-sensitive Escherichia coli. Due to history of bowel resection for obstruction, General Surgery was consulted for her bowel obstruction. She was put on amikacin and cefepime. 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 She was kept intubated postoperatively. Hospital Medicine changed amikacin to metronidazole, then later stopped metronidazole and changed cefepime to meropenem. She was given total parenteral nutrition. She became oliguric but responded well to a dose of furosemide, so Nephrology gave her doses of bumetanide. She developed thrombocytopenia. She developed atrial fibrillation and sinus pauses, so Cardiology was consulted and placed a temporary transvenous pacemaker. Echocardiogram only showed concentric remodeling and mild mitral regurgitation. Norepinephrine was changed to dopamine. EEG showed triphasic waves bilaterally consistent with metabolic encephalopathy. The epileptologist recommended treating her hyponatremia. She required pRBC transfusion. Nephrology improved her hyponatremia, and encephalopathy resolved. She was weaned off vasopressors and was extubated the morning of 11/2/19. Surgery stopped TPN and started full liquid diet on 11/3/19. Ertapenem was changed to cefazolin but Infectious Disease restarted metronidazole to continue empiric anaerobe coverage. Cardiology removed her temporary pacemaker on 11/4/19.     Interval History: Sitting up in chair this afternoon, but is having some nausea after getting up. First time to chair since admission. Denies any abdominal pain. Ate a little more breakfast this AM.     Review of Systems   Constitutional: Negative for fever.   Respiratory: Negative for cough  and shortness of breath.    Cardiovascular: Negative for chest pain and palpitations.   Gastrointestinal: Positive for nausea. Negative for vomiting.     Objective:     Vital Signs (Most Recent):  Temp: 99.8 °F (37.7 °C) (11/10/19 1145)  Pulse: 85 (11/10/19 1600)  Resp: 20 (11/10/19 1145)  BP: (!) 158/70 (11/10/19 1210)  SpO2: 95 % (11/10/19 1145) Vital Signs (24h Range):  Temp:  [96.2 °F (35.7 °C)-99.8 °F (37.7 °C)] 99.8 °F (37.7 °C)  Pulse:  [82-97] 85  Resp:  [14-20] 20  SpO2:  [92 %-97 %] 95 %  BP: (148-188)/(67-85) 158/70     Weight: 68.1 kg (150 lb 2.1 oz)  Body mass index is 29.32 kg/m².    Intake/Output Summary (Last 24 hours) at 11/10/2019 1849  Last data filed at 11/10/2019 1539  Gross per 24 hour   Intake 490 ml   Output 2350 ml   Net -1860 ml      Physical Exam   Constitutional: She is oriented to person, place, and time. She appears well-developed and well-nourished. No distress.   Cardiovascular: Normal rate and regular rhythm.   No murmur heard.  Pulmonary/Chest: Effort normal and breath sounds normal. No respiratory distress. She has no wheezes.   Abdominal: Soft. Bowel sounds are normal. She exhibits no distension. There is tenderness.   Musculoskeletal: She exhibits edema.   Neurological: She is alert and oriented to person, place, and time.   Skin: Ecchymosis noted.   Psychiatric: She has a normal mood and affect. Her behavior is normal.   Nursing note and vitals reviewed.      Significant Labs:   CMP:   Recent Labs   Lab 11/09/19  0539 11/10/19  0546    139   K 3.3* 3.4*    105   CO2 26 28   * 105   BUN 24* 15   CREATININE 1.1 0.9   CALCIUM 6.8* 7.3*   PROT 5.0* 5.1*   ALBUMIN 2.4* 2.5*   BILITOT 0.3 0.3   ALKPHOS 87 77   AST 30 28   ALT 5* <5*   ANIONGAP 9 6*   EGFRNONAA 47* 60     Magnesium:   Recent Labs   Lab 11/09/19  0539 11/10/19  0546   MG 1.9 1.2*       Significant Imaging: I have reviewed all pertinent imaging results/findings within the past 24  hours.      Assessment/Plan:      * Colon necrosis  E. coli bacteremia  Acute renal failure due to Acute tubular necrosis  DIC (disseminated intravascular coagulation)  Giving cefazolin and metronidazole per ID until 11/15/16. Status post sigmoidectomy. Creatinine decreased to 0.9. Continues to have renal recovery. Removed trialysis catheter 11/7/19. Appreciate General Surgery, Pulmonology, Cardiology, Nephrology, Hematology-Oncology, and Infectious Disease.     Hypophosphatemia  Give neutraphos.       Hypomagnesemia  Give mag sulfate IV.       Hypokalemia  Give KCl po.       Hoarseness  Right vocal cord paralysis  Appreciate ENT evaluation.  SLP following.    ENT requests CT neck and chest with IV contrast, but will hold off on that for right now as kidneys continue to recover.        Acute urinary retention  Martinez in place.   Follow up with Urology as outpatient.       Anemia of acute infection  Transfused pRBCs.    Slow transit constipation  Takes polyethylene glycol at home.    Peripheral vascular disease, unspecified  Hyperlipidemia   Takes pravastatin.    Old MI (myocardial infarction)  Takes pravastatin.    Depression  Continue home mirtazapine 7.5mg HS.    Anemia due to antineoplastic chemotherapy  Thrombocytopenia  Stable.  Continue to monitor.     DLBCL (diffuse large B cell lymphoma)  Follow up outpatient.    Acquired hypothyroidism  Continue home levothyroxine 125 mcg before breakfast.  Lab Results   Component Value Date    TSH 0.507 11/01/2019       Epilepsy  Continue home oxycarbazepine 300 mg nightly, phenobarbital 64.8 mg nightly.    Renovascular hypertension  Holding home lisinopril, clonidine, carvedilol.  SBP ranged 148 to 171   Continue to monitor.  Resume carvedilol 12.5 mg BID (1/2 home dose) this evening.       VTE Risk Mitigation (From admission, onward)         Ordered     heparin (porcine) 1,000 unit/mL injection      11/04/19 1534     heparin (porcine) injection 5,000 Units  Every 8 hours       11/02/19 1447     heparin, porcine (PF) 100 unit/mL injection flush 1,000 Units  As needed (PRN)      11/02/19 2049     IP VTE LOW RISK PATIENT  Once      10/30/19 0811     Place sequential compression device  Until discontinued      10/28/19 0607                      Ryan Espinosa MD  Department of Hospital Medicine   [facility]

## 2019-11-11 NOTE — ASSESSMENT & PLAN NOTE
Right vocal cord paralysis -Fu ENT - planning CT of neck later after kidneys have a few more days to recover

## 2019-11-11 NOTE — PROGRESS NOTES
Pharmacist Renal Dose Adjustment Note    Annette Garcia is a 84 y.o. female being treated with the medication cefazolin    Patient Data:    Vital Signs (Most Recent):  Temp: 97.7 °F (36.5 °C) (11/11/19 0729)  Pulse: 74 (11/11/19 0729)  Resp: 16 (11/11/19 0729)  BP: (!) 147/67 (11/11/19 0729)  SpO2: (!) 93 % (11/11/19 0729)   Vital Signs (72h Range):  Temp:  [96.2 °F (35.7 °C)-99.9 °F (37.7 °C)]   Pulse:  [74-97]   Resp:  [14-20]   BP: (111-188)/(53-85)   SpO2:  [92 %-97 %]      Recent Labs   Lab 11/09/19  0539 11/10/19  0546 11/11/19  0356   CREATININE 1.1 0.9 0.8     Serum creatinine: 0.8 mg/dL 11/11/19 0356  Estimated creatinine clearance: 46.6 mL/min    Medication:cefazolin dose: 2g frequency q12 will be changed to medication:cefazolin dose:2g frequency:q8    Pharmacist's Name: Geno Morales  Pharmacist's Extension: [phone]

## 2019-11-11 NOTE — PROGRESS NOTES
[facility]  General Surgery  Progress Note    Subjective:     Post-Op Info:  Procedure(s) (LRB):  Insertion, Pacemaker, Temporary Transvenous (Right)   12 Days Post-Op     Interval History: No acute events overnight. VSS, AF. Breathing comfortably on RA. No abdominal pain. Martinez still in place secondary to urinary retention. Voice still very weak secondary to right vocal cord paralysis. Still tolerates regular diet without n/v. Ostomy functional. Working with PT OOBTC; rec SNF.    Medications:  Continuous Infusions:    Scheduled Meds:   calcium carbonate  1,000 mg Oral TID    carvedilol  12.5 mg Oral BID    ceFAZolin (ANCEF) IVPB  2 g Intravenous Q12H    epoetin moe-ebpx (RETACRIT) injection  20,000 Units Subcutaneous Every Tues, Thurs, Sat    heparin (porcine)  5,000 Units Subcutaneous Q8H    iron sucrose  100 mg Intravenous Every Mon, Wed, Fri    levothyroxine  125 mcg Oral Before breakfast    metroNIDAZOLE  500 mg Oral Q8H    OXcarbazepine  300 mg Oral QHS    PHENobarbital  64.8 mg Oral Nightly    potassium, sodium phosphates  2 packet Oral QID (AC & HS)    pravastatin  20 mg Oral Daily     PRN Meds:acetaminophen, albuterol-ipratropium, heparin, porcine (PF), morphine, ondansetron, promethazine (PHENERGAN) IVPB, ramelteon, sodium chloride 0.9%     Review of patient's allergies indicates:   Allergen Reactions    Adhesive Itching and Blisters    Penicillins Anaphylaxis    Tramadol Hives    Avelox [moxifloxacin] Rash     Facial and arm itching and redness. Pt states throat closes when given.    Amoxil [amoxicillin]     Aspridrox [aspirin, buffered]     Codeine Other (See Comments)     Throat swelling    Keflex [cephalexin]      Tolerated cefepime    Norvasc [amlodipine]     Red dye Hives    Robitussin [guaifenesin]     Sulfa (sulfonamide antibiotics)     Tylenol [acetaminophen]      Has reaction to Tylenol with red dye and unable to take Extra Strength Tylenol/ CAN ONLY  TOLERATE REG STRENGTH TYLENOL    Vicks vaporub [camphor-eucalyptus oil-menthol]      Objective:     Vital Signs (Most Recent):  Temp: 98.2 °F (36.8 °C) (11/11/19 0432)  Pulse: 76 (11/11/19 0432)  Resp: 16 (11/11/19 0432)  BP: (!) 160/72 (11/11/19 0432)  SpO2: 96 % (11/11/19 0432) Vital Signs (24h Range):  Temp:  [96.3 °F (35.7 °C)-99.8 °F (37.7 °C)] 98.2 °F (36.8 °C)  Pulse:  [75-97] 76  Resp:  [14-20] 16  SpO2:  [92 %-97 %] 96 %  BP: (124-188)/(59-85) 160/72     Weight: 68.1 kg (150 lb 2.1 oz)  Body mass index is 29.32 kg/m².    Intake/Output - Last 3 Shifts       11/09 0700 - 11/10 0659 11/10 0700 - 11/11 0659    P.O. 600 650    I.V. (mL/kg)  100 (1.5)    IV Piggyback 150 50    Total Intake(mL/kg) 750 (11) 800 (11.7)    Urine (mL/kg/hr) 2500 (1.5) 2050 (1.3)    Stool  200    Total Output 2500 2250    Net -1750 -1450                Physical Exam   Constitutional: She appears well-developed. She appears lethargic. No distress. Oriented to person, place and time.   Cardiovascular: Normal rate and regular rhythm.   Abdominal: Soft. There is no guarding. There is no tenderness.  Ostomy with stool/gas in bag.   Musculoskeletal:  She exhibits no tenderness.   Neurological: She appears lethargic.   Nursing note and vitals reviewed.      Significant Labs:  CBC:   Recent Labs   Lab 11/11/19 0356   WBC 5.25   RBC 2.33*   HGB 7.6*   HCT 24.9*      *   MCH 32.6*   MCHC 30.5*     CMP:   Recent Labs   Lab 11/10/19  0546 11/11/19 0356    117*   CALCIUM 7.3* 7.3*   ALBUMIN 2.5*  --    PROT 5.1*  --     139   K 3.4* 3.7   CO2 28 29    103   BUN 15 11   CREATININE 0.9 0.8   ALKPHOS 77  --    ALT <5*  --    AST 28  --    BILITOT 0.3  --          Significant Diagnostics:  I have reviewed all pertinent imaging results/findings within the past 24 hours.    Assessment/Plan:     Hoarseness  Right vocal cord paralysis -Fu ENT - planning CT of neck later after kidneys have a few more days to  recover    Abdominal pain  84-year-old female s/p sigmoid colectomy with end colostomy 10/30/19.      Plan:  continue abx; per ID stop date for IV cefazolin/metronidazole 11/15/19 (per ID ok for both to be switched to po)  Nephrology signed off on 11/8, gonzales in place for urinary retention. Following up with neph in clinic 2-3 weeks post-dc.  PT/OT  Continue regular diet, boost, suplena   OOBTC/sit on edge of bed, to chair with PT as able. Rec d/c to SNF when medically able.          Selam Hanson MD  General Surgery  [facility]

## 2019-11-11 NOTE — PLAN OF CARE
VN rounding: VN cued into patient's room to complete evening rounding. Patient sittiung up in bed resting without difficulties. Daughter at bedside. At this time patient denies any questions, concerns and needs. Will cont to be available to patient and intervene prn.

## 2019-11-11 NOTE — ASSESSMENT & PLAN NOTE
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      Plan:  continue abx; per ID stop date for IV cefazolin/metronidazole 11/15/19 (per ID ok for both to be switched to po)  Nephrology signed off on 11/8, gonzales in place for urinary retention. Following up with neph in clinic 2-3 weeks post-dc.  PT/OT  Continue regular diet, boost, suplena   OOBTC/sit on edge of bed, to chair with PT as able. Rec d/c to SNF when medically able.

## 2019-11-11 NOTE — PROCEDURES
Annette Garcia is a 84 y.o. female patient.    Temp: 99.6 °F (37.6 °C) (11/10/19 1850)  Pulse: 88 (11/10/19 1850)  Resp: 18 (11/10/19 1850)  BP: (!) 164/68 (11/10/19 1850)  SpO2: (!) 93 % (11/10/19 1850)  Weight: 68.1 kg (150 lb 2.1 oz) (11/07/19 0626)  Height: 5' (152.4 cm) (10/31/19 1000)  Mallampati Scale: Class III  ASA Classification: Class 3    Procedures       Flexible Fiberoptic Laryngoscopy    Indications: Hoarseness    Anesthesia: Topical xylocaine with nunu-synephrine    Complications: None    Findings: right vocal cord paralysis    Procedure in Detail: After verbal consent obtained and risks, benefits and alternatives were discussed with the patient, nares were decongested and anesthetized, and flexible fiberoptic laryngoscope passed via right nare with following results:  - Nasal cavity: slight leftward septal deviation, no inferior turbinate hypertrophy, no mass or lesion  - Nasopharynx: no adenoid hypertrophy, no mass or lesion  - Oropharynx: no lingual tonsil asymmetry, no mass or lesion  - Hypopharynx: no mass or lesion  - Supraglottis: no mass or lesion, no significant interarytenoid edema or erythema  - Glottis: left true vocal cords with normal mobility, right vocal cord paralysis - lateralized, some bowing of both cords likely from age, no edema, no mass or lesion    Patient tolerated the procedure well    Selam Haque  11/10/2019

## 2019-11-11 NOTE — PLAN OF CARE
11/11/19 1153   Post-Acute Status   Post-Acute Authorization Placement   Post-Acute Placement Status Pending Post-Acute Medical Review   The Sw spoke to Annamarie and faxed her updated therapy notes on the pt. Annamarie states they still have to check the pt's secondary benefits with [facility] even though Medicare is her primary.

## 2019-11-11 NOTE — ASSESSMENT & PLAN NOTE
Right vocal cord paralysis  Appreciate ENT evaluation.  SLP following.    ENT requests CT neck and chest with IV contrast, but will hold off on that for right now as kidneys continue to recover.

## 2019-11-11 NOTE — SUBJECTIVE & OBJECTIVE
Interval History: No acute events overnight. VSS, AF. Breathing comfortably on RA. No abdominal pain. Martinez still in place secondary to urinary retention. Voice still very weak secondary to right vocal cord paralysis. Still tolerates regular diet without n/v. Ostomy functional. Working with PT OOBTC; rec SNF.    Medications:  Continuous Infusions:    Scheduled Meds:   calcium carbonate  1,000 mg Oral TID    carvedilol  12.5 mg Oral BID    ceFAZolin (ANCEF) IVPB  2 g Intravenous Q12H    epoetin moe-ebpx (RETACRIT) injection  20,000 Units Subcutaneous Every Tues, Thurs, Sat    heparin (porcine)  5,000 Units Subcutaneous Q8H    iron sucrose  100 mg Intravenous Every Mon, Wed, Fri    levothyroxine  125 mcg Oral Before breakfast    metroNIDAZOLE  500 mg Oral Q8H    OXcarbazepine  300 mg Oral QHS    PHENobarbital  64.8 mg Oral Nightly    potassium, sodium phosphates  2 packet Oral QID (AC & HS)    pravastatin  20 mg Oral Daily     PRN Meds:acetaminophen, albuterol-ipratropium, heparin, porcine (PF), morphine, ondansetron, promethazine (PHENERGAN) IVPB, ramelteon, sodium chloride 0.9%     Review of patient's allergies indicates:   Allergen Reactions    Adhesive Itching and Blisters    Penicillins Anaphylaxis    Tramadol Hives    Avelox [moxifloxacin] Rash     Facial and arm itching and redness. Pt states throat closes when given.    Amoxil [amoxicillin]     Aspridrox [aspirin, buffered]     Codeine Other (See Comments)     Throat swelling    Keflex [cephalexin]      Tolerated cefepime    Norvasc [amlodipine]     Red dye Hives    Robitussin [guaifenesin]     Sulfa (sulfonamide antibiotics)     Tylenol [acetaminophen]      Has reaction to Tylenol with red dye and unable to take Extra Strength Tylenol/ CAN ONLY TOLERATE REG STRENGTH TYLENOL    Vicks vaporub [camphor-eucalyptus oil-menthol]      Objective:     Vital Signs (Most Recent):  Temp: 98.2 °F (36.8 °C) (11/11/19 0432)  Pulse: 76 (11/11/19  0432)  Resp: 16 (11/11/19 0432)  BP: (!) 160/72 (11/11/19 0432)  SpO2: 96 % (11/11/19 0432) Vital Signs (24h Range):  Temp:  [96.3 °F (35.7 °C)-99.8 °F (37.7 °C)] 98.2 °F (36.8 °C)  Pulse:  [75-97] 76  Resp:  [14-20] 16  SpO2:  [92 %-97 %] 96 %  BP: (124-188)/(59-85) 160/72     Weight: 68.1 kg (150 lb 2.1 oz)  Body mass index is 29.32 kg/m².    Intake/Output - Last 3 Shifts       11/09 0700 - 11/10 0659 11/10 0700 - 11/11 0659    P.O. 600 650    I.V. (mL/kg)  100 (1.5)    IV Piggyback 150 50    Total Intake(mL/kg) 750 (11) 800 (11.7)    Urine (mL/kg/hr) 2500 (1.5) 2050 (1.3)    Stool  200    Total Output 2500 2250    Net -1750 -1450                Physical Exam   Constitutional: She appears well-developed. She appears lethargic. No distress.   Cardiovascular: Normal rate and regular rhythm.   Abdominal: Soft. There is no guarding.   Ostomy with stool/gas in bag. Ostomy mildly edematous, dusky in appearance   Musculoskeletal: She exhibits edema. She exhibits no tenderness.   Neurological: She appears lethargic.   Nursing note and vitals reviewed.      Significant Labs:  CBC:   Recent Labs   Lab 11/11/19 0356   WBC 5.25   RBC 2.33*   HGB 7.6*   HCT 24.9*      *   MCH 32.6*   MCHC 30.5*     CMP:   Recent Labs   Lab 11/10/19  0546 11/11/19 0356    117*   CALCIUM 7.3* 7.3*   ALBUMIN 2.5*  --    PROT 5.1*  --     139   K 3.4* 3.7   CO2 28 29    103   BUN 15 11   CREATININE 0.9 0.8   ALKPHOS 77  --    ALT <5*  --    AST 28  --    BILITOT 0.3  --          Significant Diagnostics:  I have reviewed all pertinent imaging results/findings within the past 24 hours.

## 2019-11-11 NOTE — PLAN OF CARE
VN cued into pt's room for introduction. VN informed pt and niece that VN would be working along side bedside nurse and PCT throughout shift. Level of present pain assessed. At present no distress noted. Thoroughly discussed with patient and niece today's plan of care. Discussed with patient and niece High fall risk protocol and interventions that have been initiated and cont be in place for safety. Patient and niece verbalized clear understanding and cooperation using teach back method. Bed alarm presently activated and in use. Will cont to be available to patient and intervene prn.

## 2019-11-11 NOTE — PROGRESS NOTES
[facility]  Infectious Disease  Progress Note  Pt Name: Annette Garcia   Room: K460/K460 A  Admitted On: 10/27/2019  Today: 2019    Subjective:       Feeling well this morning.  Continues to be very hoarse.  Tolerating diet.  Reports developing rash in bilateral armpits.  Denies chest pain, nausea, vomiting.     Objective:   Last 24 Hour Vital Signs:  BP  Min: 124/59  Max: 188/85  Temp  Av.4 °F (36.9 °C)  Min: 96.3 °F (35.7 °C)  Max: 99.8 °F (37.7 °C)  Pulse  Av.4  Min: 74  Max: 97  Resp  Av.9  Min: 14  Max: 20  SpO2  Av.1 %  Min: 92 %  Max: 97 %  I/O last 3 completed shifts:  In: 1100 [P.O.:900; I.V.:100; IV Piggyback:100]  Out: 3650 [Urine:3450; Stool:200]    Physical Examination:    Const: Well-appearing, in NAD, very hoarse.  Cardio: RRR, no murmurs, 2+ peripheral pulses   Pulm: Lungs clear to auscultation bilaterally, normal work of breathing   Abdomen: Soft, mild tenderness to palpation, non-distended, normoactive bowel sounds.  Ostomy in place with brown stool output.  Midline incision healing well.  Extremities: No peripheral edema  Skin: Warm and dry, developing rash bilateral armpits.    Neuro: Alert and oriented x 3. No focal deficits.      Laboratory:    Hematology:  Recent Labs   Lab 19  1525 19  0541 19  0622 19  0356   WBC 12.26 8.75 8.08 5.25   HGB 7.1* 7.0* 7.0* 7.6*   PLT 77* 176 200 277   * 103* 103* 107*   RDW 14.9* 15.6* 15.8* 17.5*       Chemistry:  Recent Labs   Lab 19  0245 19  0541 19  0622 19  0539 11/10/19  0546 19  0356    142 141 141 139 139   K 3.5 3.4* 3.6 3.3* 3.4* 3.7    106 109 106 105 103   CO2 22* 25 24 26 28 29   BUN 71* 55* 36* 24* 15 11   CREATININE 2.4* 1.9* 1.4 1.1 0.9 0.8   * 100 116* 124* 105 117*   CALCIUM 6.5* 6.6* 6.6* 6.8* 7.3* 7.3*   PROT 4.6* 4.7* 4.7* 5.0* 5.1*  --    ALBUMIN 2.1* 2.2* 2.2* 2.4* 2.5*  --    ALT 8* 5* 5* 5* <5*  --    AST  42* 40 39 30 28  --    ALKPHOS 112 100 87 87 77  --    MG  --  1.4* 1.4* 1.9 1.2* 1.8   PHOS  --  4.1 2.9 2.2* 2.1* 2.5*       DM:  Recent Labs   Lab 11/09/19  0539 11/10/19  0546 11/11/19  0356   * 105 117*         Microbiology:  Microbiology Results (last 7 days)     Procedure Component Value Units Date/Time    Blood culture [[id number]] Collected:  11/01/19 1516    Order Status:  Completed Specimen:  Blood Updated:  11/06/19 1822     Blood Culture, Routine No growth after 5 days.    Narrative:       Collection has been rescheduled by AC2 at 11/01/2019 14:23 Reason: pt   recieving unit  Collection has been rescheduled by AC2 at 11/01/2019 14:23 Reason: pt   recieving unit    Blood culture [[id number]] Collected:  11/01/19 1335    Order Status:  Completed Specimen:  Blood Updated:  11/06/19 1822     Blood Culture, Routine No growth after 5 days.            Antibiotics and Day Number of Therapy:  Antibiotics (From admission, onward)    Start     Stop Route Frequency Ordered    11/11/19 0810  cefazolin (ANCEF) 2 gram in dextrose 5% 50 mL IVPB (premix)      -- IV Every 8 hours (non-standard times) 11/11/19 0759    11/06/19 1700  metroNIDAZOLE tablet 500 mg      -- Oral Every 8 hours 11/06/19 1614            Assessment/Plan:     Ms. Garcia is an 85 yo female with E. Coli sepsis 10/28 s/p sigmoid colectomy for bowel necrosis on 10/29/19. Repeat BC negative 11/1/19.  She has multiple allergies to antibiotics; vanco discontinued 11/2; ertapenem discontinued 11/3; cefazolin started 11/3 and flagyl started 11/3.      Rec:  - Recommend 2 weeks antibiotics past 11/1; stop date 11/15.  - Continue cefazolin IVPB ;metronidazole changed to PO 11/6  - Can transition to PO keflex 500 mg q6h  - Add fluconazole 100 mg q24 for 4 days for superficial yeast infection    Rob Junior MD  [employer] [facility]  Infectious Disease

## 2019-11-11 NOTE — PLAN OF CARE
Problem: Physical Therapy Goal  Goal: Physical Therapy Goal  Description  Goals to be met by:      Patient will increase functional independence with mobility by performin. Supine to sit with Moderate Assistance-Met 19  REVISED: CG/Lina  2. Sit to supine with Moderate Assistance  3. Rolling to Left and Right with Moderate Assistance.-met 19  REVISED: CGA  4. Sit to stand transfer with Moderate Assistance using RW  5. Bed to chair transfer with Minimal Assistance and Moderate Assistance using Rolling Walker-met 19  REVISED: CG/Lina  6. Gait  x 25 feet with Minimal Assistance and Moderate Assistance using Rolling Walker.   7. Lower extremity exercise program x10 with active BLE with assistance as needed      Outcome: Ongoing, Progressing   Patient progressed to using RW to amb bed to chair with min/modA x 2; will cont with POC and cont to recommend SNF to maximize functional status.

## 2019-11-11 NOTE — PT/OT/SLP PROGRESS
Occupational Therapy   Treatment    Name: Annette Garcia  MRN: [id number]  Admitting Diagnosis:  Colon necrosis  12 Days Post-Op    Recommendations:     Discharge Recommendations: nursing facility, skilled  Discharge Equipment Recommendations:  (Defer to SNF)  Barriers to discharge:  Decreased caregiver support    Assessment:   Patient demonstrating improved bed mobility, functional transition from EOB and ability to step to b/s chair using RW. Patient pleasant and motivated this date. Patient will benefit from continued skilled OT to address deficits and improve performance in functional ADL tasks. Cont OT.    Annette Garcia is a 84 y.o. female with a medical diagnosis of Colon necrosis. Performance deficits affecting function are weakness, impaired endurance, impaired self care skills, impaired functional mobilty, gait instability, impaired balance, decreased upper extremity function, decreased lower extremity function, decreased safety awareness, decreased ROM, impaired skin, edema.     Rehab Prognosis:  Fair+; patient would benefit from acute skilled OT services to address these deficits and reach maximum level of function.       Plan:     Patient to be seen 5 x/week to address the above listed problems via self-care/home management, therapeutic activities, therapeutic exercises  · Plan of Care Expires: 12/01/19  · Plan of Care Reviewed with: patient    Subjective     Pain/Comfort:  · Pain Rating 1: 0/10(no pain reported)    Objective:     Communicated with: nurseQuin prior to session.  Patient found HOB elevated with bed alarm, central line, telemetry, gonzales catheter, colostomy upon OT entry to room.    General Precautions: Standard, fall, seizure   Orthopedic Precautions:N/A   Braces: N/A     Bed Mobility:    · Patient completed Rolling/Turning to Left with  contact guard assistance, minimum assistance and with side rail  · Patient completed Scooting/Bridging with minimum assistance  · Patient  completed Supine to Sit with moderate assistance, with side rail and increased time/effort     Functional Mobility/Transfers:  · Patient completed Sit <> Stand Transfer with minimum assistance and of 2 persons  with  rolling walker   · Patient completed Bed <> Chair Transfer using Step Transfer technique with minimum assistance, moderate assistance and of 2 persons with rolling walker    Activities of Daily Living:  · Grooming: set-up and SBA    · Upper Body Dressing: contact guard assistance    · Toileting: gonzales catheter and colostomy      AMPAC 6 Click ADL: 13    Treatment & Education:  Patient alert and agreeable to therapy. Still demos dysphonia (2/2 R vocal cord paralysis). VCs for bed mob - see above. Patient /c improved ability to scoot out to EOB and place B feet on floor. Patient began to come to standing /c (A) from therapists, but demos flexed posture and posterior lean. Patient sat EOB and RW placed in front of patient. Able to stand Lina of 2 using RW and step to b/s chair (4-5 steps) /c min-modA for upright balance. Patient stood a second time from chair level /c Lina  -- difficulty scooting bottom to back of chair 2/2 short stature. Patient completed G/H tasks from chair level set-up. BLEs elevated and reclined for comfort.     2nd session: Patient tolerated OOB to chair x ~1.5 hours. Patient instructed in sit > stand using RW /c min-modA of 2 and able to step to EOB modA and flexed posture. MaxA of 2 return to supine and reposition in bed.     Patient left up in chair with all lines intact, call button in reach and Quin notifiedEducation:      GOALS:   Multidisciplinary Problems     Occupational Therapy Goals        Problem: Occupational Therapy Goal    Goal Priority Disciplines Outcome Interventions   Occupational Therapy Goal     OT, PT/OT Ongoing, Progressing    Description:  Goals to be met by: 12/1     Patient will increase functional independence with ADLs by performing:  MET 11/1/19 Pt  will maintain gaze x 30 secs  MET 11/1/19 Pt will follow 1 step commands 25% of the time  Pt's family will be indep w/PROM -- no longer applicable, patient alert and /c AROM in BUEs    New Goals set 11/4/2019  Pt will complete AROM shoulder flex/ext through 75% of available AROM.  Pt will complete simple grooming task min A seated with HOB elevated --MET 11/11                             Time Tracking:     OT Date of Treatment: 11/11/19  OT Start Time: 1322    [id number]: 10 mins TA  OT Stop Time: 1400  OT Total Time (min): 38 min co-tx /c PT    Billable Minutes:Self Care/Home Management 12  Therapeutic Activity 12    FESTUS Rothman/LUIS FELIPE  11/11/2019

## 2019-11-11 NOTE — PT/OT/SLP PROGRESS
Physical Therapy      Patient Name:  Annette Garcia   MRN:  [id number]    Patient not seen at this time 2/2 nsg assessment then bathing. Will follow-up later.    Kinga Velásquez, PT   11/11/2019

## 2019-11-11 NOTE — PLAN OF CARE
Problem: Occupational Therapy Goal  Goal: Occupational Therapy Goal  Description  Goals to be met by: 12/1     Patient will increase functional independence with ADLs by performing:  MET 11/1/19 Pt will maintain gaze x 30 secs  MET 11/1/19 Pt will follow 1 step commands 25% of the time  Pt's family will be indep w/PROM -- no longer applicable, patient alert and /c AROM in BUEs    New Goals set 11/4/2019  Pt will complete AROM shoulder flex/ext through 75% of available AROM.  Pt will complete simple grooming task min A seated with HOB elevated --MET 11/11       Outcome: Ongoing, Progressing     Patient demonstrating improved bed mobility, functional transition from EOB and ability to step to b/s chair using RW. Patient pleasant and motivated this date. Patient will benefit from continued skilled OT to address deficits and improve performance in functional ADL tasks. Cont OT.

## 2019-11-11 NOTE — ASSESSMENT & PLAN NOTE
E. coli bacteremia  Acute renal failure due to Acute tubular necrosis  DIC (disseminated intravascular coagulation)  Giving cefazolin and metronidazole per ID until 11/15/16. Status post sigmoidectomy. Creatinine decreased to 0.9. Continues to have renal recovery. Removed trialysis catheter 11/7/19. Appreciate General Surgery, Pulmonology, Cardiology, Nephrology, Hematology-Oncology, and Infectious Disease.

## 2019-11-11 NOTE — PT/OT/SLP PROGRESS
Physical Therapy Treatment    Patient Name:  Annette Garcia   MRN:  [id number]    Recommendations:     Discharge Recommendations:  nursing facility, skilled   Discharge Equipment Recommendations: (defer to SNF)   Barriers to discharge: Decreased caregiver support    Assessment:     Annette Garcia is a 84 y.o. female admitted with a medical diagnosis of Colon necrosis.  She presents with the following impairments/functional limitations:  weakness, impaired endurance, gait instability, impaired functional mobilty, impaired self care skills, impaired balance, decreased upper extremity function, decreased lower extremity function, decreased safety awareness, decreased ROM, impaired skin, edema Patient progressed to using RW to amb bed to chair with min/modA x 2; will cont with POC and cont to recommend SNF to maximize functional status..    Rehab Prognosis: Fair+; patient would benefit from acute skilled PT services to address these deficits and reach maximum level of function.    Recent Surgery: Procedure(s) (LRB):  Insertion, Pacemaker, Temporary Transvenous (Right) 12 Days Post-Op    Plan:     During this hospitalization, patient to be seen 6 x/week to address the identified rehab impairments via gait training, therapeutic activities, therapeutic exercises, neuromuscular re-education and progress toward the following goals:    · Plan of Care Expires:  12/01/19    Subjective     Pain/Comfort:  · Pain Rating 1: 0/10  · Pain Rating Post-Intervention 1: 0/10      Objective:     Communicated with nurse prior to session.  Patient found with bed alarm, central line, telemetry, gonzales catheter, colostomy upon PT entry to room.     General Precautions: Standard, fall, seizure   Orthopedic Precautions:N/A   Braces: N/A     Functional Mobility:  · Bed Mobility:     · Rolling Left:  contact guard assistance, minimum assistance and use of side rail  · Scooting: minimum assistance  · Supine to Sit: moderate assistance and use  of rail with increased time/effort  · Sit to Supine: maximal assistance and of 2 persons  · Transfers:     · Sit to Stand:  minimum assistance and of 2 persons with rolling walker  · Bed to Chair: minimum assistance, moderate assistance and of 2 persons with  rolling walker  using  Step Transfer  · Gait: Patient amb ~5ft using RW with flexed posture, short step length, decreased floor clearanc, slow alexa in AM; pt amb ~2-3ft from chair to bed as in AM      AM-PAC 6 CLICK MOBILITY  Turning over in bed (including adjusting bedclothes, sheets and blankets)?: 3  Sitting down on and standing up from a chair with arms (e.g., wheelchair, bedside commode, etc.): 2  Moving from lying on back to sitting on the side of the bed?: 2  Moving to and from a bed to a chair (including a wheelchair)?: 2  Need to walk in hospital room?: 2  Climbing 3-5 steps with a railing?: 1  Basic Mobility Total Score: 12       Therapeutic Activities and Exercises:   1st session: initial A/AAROM BLEs x 15 reps-APs, heel slides, hip abd/add, SLR; performed bed mob as above with VCs for effective technique; improved scooting to EOB with Lina; pt began to stand as soon as her feet touched the floor, flexed posture/posterior lean-had pt sit EOB to place RW in front; pt sit to stand with Lina x 2 using RW and stepped to chair with min/modA, VCs/TCs for upright posture; pt stood from chair a second trial with Lina to scoot back into the chair-ADLs with OT; BLEs elevated.    2nd session: Patient tolerated OOB to chair ~1.5 hrs; pt instructed in sit to stand using RW with min/modA of 2, VCs for hand placement; pt stepped to EOB with modA and flexed posture; maxA x 2 to return to bed.    Patient left HOB elevated with all lines intact, call button in reach, bed alarm on and nurse notified..    GOALS:   Multidisciplinary Problems     Physical Therapy Goals        Problem: Physical Therapy Goal    Goal Priority Disciplines Outcome Goal Variances  Interventions   Physical Therapy Goal     PT, PT/OT Ongoing, Progressing     Description:  1.  Roll bilaterally with supervision  2.  Supine to/from sit with supervision  3.  Bed to/from chair with CG  4.  Ambulate 50' with RW with CG  5.  Sit in chair 2 hours           Problem: Physical Therapy Goal    Goal Priority Disciplines Outcome Goal Variances Interventions   Physical Therapy Goal     PT, PT/OT Ongoing, Progressing     Description:  Goals to be met by:      Patient will increase functional independence with mobility by performin. Supine to sit with Moderate Assistance-Met 19  REVISED: CG/Lina  2. Sit to supine with Moderate Assistance  3. Rolling to Left and Right with Moderate Assistance.-met 19  REVISED: CGA  4. Sit to stand transfer with Moderate Assistance using RW  5. Bed to chair transfer with Minimal Assistance and Moderate Assistance using Rolling Walker-met 19  REVISED: CG/Lina  6. Gait  x 25 feet with Minimal Assistance and Moderate Assistance using Rolling Walker.   7. Lower extremity exercise program x10 with active BLE with assistance as needed                       Time Tracking:     PT Received On: 19  PT Start Time: 1524(1322)     PT Stop Time: 1535(1400)  PT Total Time (min): 11 min in second session with OT; 38 minutes in first session with OT    Billable Minutes: AM session: Therapeutic [id number] minutes   PM session: Therapeutic Acitivity 11 minutes    Treatment Type: Treatment  PT/PTA: PT     PTA Visit Number: 0     Kinga Velásquez, PT  2019

## 2019-11-12 VITALS
WEIGHT: 157.44 LBS | OXYGEN SATURATION: 94 % | SYSTOLIC BLOOD PRESSURE: 114 MMHG | HEART RATE: 67 BPM | TEMPERATURE: 98 F | BODY MASS INDEX: 30.91 KG/M2 | HEIGHT: 60 IN | RESPIRATION RATE: 18 BRPM | DIASTOLIC BLOOD PRESSURE: 58 MMHG

## 2019-11-12 DIAGNOSIS — G40.209 PARTIAL SYMPTOMATIC EPILEPSY WITH COMPLEX PARTIAL SEIZURES, NOT INTRACTABLE, WITHOUT STATUS EPILEPTICUS: ICD-10-CM

## 2019-11-12 PROBLEM — E83.39 HYPOPHOSPHATEMIA: Status: RESOLVED | Noted: 2019-11-10 | Resolved: 2019-11-12

## 2019-11-12 PROBLEM — E87.6 HYPOKALEMIA: Status: RESOLVED | Noted: 2019-11-10 | Resolved: 2019-11-12

## 2019-11-12 PROBLEM — R10.9 ABDOMINAL PAIN: Status: RESOLVED | Noted: 2019-10-29 | Resolved: 2019-11-12

## 2019-11-12 LAB
ANION GAP SERPL CALC-SCNC: 8 MMOL/L (ref 8–16)
BUN SERPL-MCNC: 11 MG/DL (ref 8–23)
CALCIUM SERPL-MCNC: 8.1 MG/DL (ref 8.7–10.5)
CHLORIDE SERPL-SCNC: 99 MMOL/L (ref 95–110)
CO2 SERPL-SCNC: 29 MMOL/L (ref 23–29)
CREAT SERPL-MCNC: 0.8 MG/DL (ref 0.5–1.4)
EST. GFR  (AFRICAN AMERICAN): >60 ML/MIN/1.73 M^2
EST. GFR  (NON AFRICAN AMERICAN): >60 ML/MIN/1.73 M^2
GLUCOSE SERPL-MCNC: 95 MG/DL (ref 70–110)
MAGNESIUM SERPL-MCNC: 1.3 MG/DL (ref 1.6–2.6)
PHOSPHATE SERPL-MCNC: 2.8 MG/DL (ref 2.7–4.5)
POTASSIUM SERPL-SCNC: 3.4 MMOL/L (ref 3.5–5.1)
SODIUM SERPL-SCNC: 136 MMOL/L (ref 136–145)

## 2019-11-12 PROCEDURE — 25000003 PHARM REV CODE 250: Performed by: NURSE PRACTITIONER

## 2019-11-12 PROCEDURE — 97110 THERAPEUTIC EXERCISES: CPT

## 2019-11-12 PROCEDURE — 97530 THERAPEUTIC ACTIVITIES: CPT

## 2019-11-12 PROCEDURE — 25000003 PHARM REV CODE 250: Performed by: HOSPITALIST

## 2019-11-12 PROCEDURE — 63600175 PHARM REV CODE 636 W HCPCS: Performed by: HOSPITALIST

## 2019-11-12 PROCEDURE — 83735 ASSAY OF MAGNESIUM: CPT

## 2019-11-12 PROCEDURE — 25000003 PHARM REV CODE 250: Performed by: INTERNAL MEDICINE

## 2019-11-12 PROCEDURE — 84100 ASSAY OF PHOSPHORUS: CPT

## 2019-11-12 PROCEDURE — 97116 GAIT TRAINING THERAPY: CPT

## 2019-11-12 PROCEDURE — 92507 TX SP LANG VOICE COMM INDIV: CPT

## 2019-11-12 PROCEDURE — 99900035 HC TECH TIME PER 15 MIN (STAT)

## 2019-11-12 PROCEDURE — 36415 COLL VENOUS BLD VENIPUNCTURE: CPT

## 2019-11-12 PROCEDURE — 80048 BASIC METABOLIC PNL TOTAL CA: CPT

## 2019-11-12 RX ORDER — METRONIDAZOLE 500 MG/1
500 TABLET ORAL EVERY 8 HOURS
Qty: 9 TABLET | Refills: 0
Start: 2019-11-12 | End: 2019-11-15

## 2019-11-12 RX ORDER — CEPHALEXIN 500 MG/1
500 CAPSULE ORAL 4 TIMES DAILY
Qty: 12 CAPSULE | Refills: 0
Start: 2019-11-12 | End: 2019-11-15

## 2019-11-12 RX ORDER — LANOLIN ALCOHOL/MO/W.PET/CERES
400 CREAM (GRAM) TOPICAL ONCE
Status: COMPLETED | OUTPATIENT
Start: 2019-11-12 | End: 2019-11-12

## 2019-11-12 RX ORDER — MAGNESIUM 250 MG
250 TABLET ORAL DAILY
Refills: 0 | Status: ON HOLD | COMMUNITY
Start: 2019-11-12 | End: 2020-02-04 | Stop reason: HOSPADM

## 2019-11-12 RX ORDER — PHENOBARBITAL 64.8 MG/1
64.8 TABLET ORAL NIGHTLY
Qty: 90 TABLET | Refills: 5 | Status: SHIPPED | OUTPATIENT
Start: 2019-11-12 | End: 2020-07-14 | Stop reason: SDUPTHER

## 2019-11-12 RX ORDER — FLUCONAZOLE 100 MG/1
100 TABLET ORAL DAILY
Qty: 2 TABLET | Refills: 0
Start: 2019-11-13 | End: 2019-11-15

## 2019-11-12 RX ORDER — CALCIUM CARBONATE 600 MG
300 TABLET ORAL DAILY
Refills: 0 | Status: ON HOLD | COMMUNITY
Start: 2019-11-12 | End: 2020-09-14

## 2019-11-12 RX ADMIN — CLONIDINE HYDROCHLORIDE 0.1 MG: 0.1 TABLET ORAL at 09:11

## 2019-11-12 RX ADMIN — MAGNESIUM OXIDE TAB 400 MG (241.3 MG ELEMENTAL MG) 400 MG: 400 (241.3 MG) TAB at 02:11

## 2019-11-12 RX ADMIN — CALCIUM CARBONATE (ANTACID) CHEW TAB 500 MG 1000 MG: 500 CHEW TAB at 09:11

## 2019-11-12 RX ADMIN — METRONIDAZOLE 500 MG: 500 TABLET ORAL at 05:11

## 2019-11-12 RX ADMIN — HEPARIN SODIUM 5000 UNITS: 5000 INJECTION, SOLUTION INTRAVENOUS; SUBCUTANEOUS at 02:11

## 2019-11-12 RX ADMIN — METRONIDAZOLE 500 MG: 500 TABLET ORAL at 02:11

## 2019-11-12 RX ADMIN — LEVOTHYROXINE SODIUM 125 MCG: 25 TABLET ORAL at 05:11

## 2019-11-12 RX ADMIN — HEPARIN SODIUM 5000 UNITS: 5000 INJECTION, SOLUTION INTRAVENOUS; SUBCUTANEOUS at 05:11

## 2019-11-12 RX ADMIN — EPOETIN ALFA-EPBX 20000 UNITS: 10000 INJECTION, SOLUTION INTRAVENOUS; SUBCUTANEOUS at 09:11

## 2019-11-12 RX ADMIN — CEFAZOLIN SODIUM 2 G: 2 SOLUTION INTRAVENOUS at 09:11

## 2019-11-12 RX ADMIN — CARVEDILOL 25 MG: 25 TABLET, FILM COATED ORAL at 09:11

## 2019-11-12 RX ADMIN — FLUCONAZOLE 100 MG: 100 TABLET ORAL at 09:11

## 2019-11-12 RX ADMIN — CEPHALEXIN 500 MG: 500 CAPSULE ORAL at 09:11

## 2019-11-12 RX ADMIN — CEPHALEXIN 500 MG: 500 CAPSULE ORAL at 02:11

## 2019-11-12 RX ADMIN — CALCIUM CARBONATE (ANTACID) CHEW TAB 500 MG 1000 MG: 500 CHEW TAB at 02:11

## 2019-11-12 RX ADMIN — PRAVASTATIN SODIUM 20 MG: 20 TABLET ORAL at 09:11

## 2019-11-12 NOTE — PLAN OF CARE
11/12/19 1049   Post-Acute Status   Post-Acute Authorization Placement   Post-Acute Placement Status Additional Clinical Requested  (the sw faxed the d/c orders to [facility])   The Eduard spoke to Esteban()at [facility] and notified him the d/c orders have been sent. He states he will have nursing review them and call the Sw back with a bed assignment and contact info to call report. He states the pt's son is there currently signing the admit papers.

## 2019-11-12 NOTE — ASSESSMENT & PLAN NOTE
Holding home lisinopril, clonidine, carvedilol.  SBP ranged 124 to 188  Continue to monitor.  Increase carvedilol to 25 mg BID and start clonidine 0.1 mg BID (1/2 home dose) this evening.

## 2019-11-12 NOTE — PROGRESS NOTES
[facility]  Adult Nutrition  Progress Note    SUMMARY       Recommendations    Recommendation/Intervention:   1. Encourage PO intake of meals and supplements.     Goals:   1. >75% of EEN, EPN by RD follow up.   Nutrition Goal Status: new    Reason for Assessment    Reason For Assessment: RD follow-up  Diagnosis: (colon necrosis)  Relevant Medical History: HTN, petit mal epilepsy, scoliosis of lumbar spine, seozures, colon CA, cholecystectomy, appendectomy, sigmpidectomy  Interdisciplinary Rounds: did not attend  General Information Comments: Pt PO intake 25-50% of meals. She is drinking boost at breakfast and will try to drink 3/day going forward. If pt consumes Boost plus 3/day it will provide 1080 kcals(60% of EEN) and 42 g protein(73% of EPN).   Nutrition Discharge Planning: pt to d/c on low residue diet    Nutrition Risk Screen    Nutrition Risk Screen: other (see comments)(Largyngoscopy 11/8/19)    Nutrition/Diet History    Food Preferences: unable to assess  Spiritual, Cultural Beliefs, Quaker Practices, Values that Affect Care: no  Factors Affecting Nutritional Intake: altered gastrointestinal function    Anthropometrics    Temp: 98.7 °F (37.1 °C)  Height: 5' (152.4 cm)  Height (inches): 60 in  Weight Method: Bed Scale  Weight: 71.4 kg (157 lb 6.5 oz)  Weight (lb): 157.41 lb  Ideal Body Weight (IBW), Female: 100 lb  % Ideal Body Weight, Female (lb): 157.41 lb  BMI (Calculated): 30.7  BMI Grade: 30 - 34.9- obesity - grade I     Lab/Procedures/Meds    Pertinent Labs Reviewed: reviewed  Pertinent Labs Comments: K 3.4, Mg 1.3  Pertinent Medications Reviewed: reviewed  Pertinent Medications Comments: Heparin, Pravastatin    Estimated/Assessed Needs    Weight Used For Calorie Calculations: 71.4 kg (157 lb 6.5 oz)  Energy Calorie Requirements (kcal): 7679-0528 kcal/day(25-30 kcal/kg)  Energy Need Method: Kcal/kg  Protein Requirements: 57.24-71.55 g/day(0.8-1.0 g/kg)  Weight Used For Protein  Calculations: 71.4 kg (157 lb 6.5 oz)  Fluid Requirements (mL): per MD  Estimated Fluid Requirement Method: RDA Method  RDA Method (mL): 1785     Nutrition Prescription Ordered    Current Diet Order: Regular  Current Nutrition Support Formula Ordered: Clinimix E 5/15  Current Nutrition Support Rate Ordered: 75 (ml)  Current Nutrition Support Frequency Ordered: ml/hr  Oral Nutrition Supplement: Boost Plus    Evaluation of Received Nutrient/Fluid Intake    Energy Calories Required: not meeting needs  Protein Required: not meeting needs  Fluid Required: meeting needs  Comments: LBM 11/11  % Intake of Estimated Energy Needs: 25 - 50 %  % Meal Intake: 25 - 50 %    Nutrition Risk    Level of Risk/Frequency of Follow-up: low     Assessment and Plan    * Colon necrosis  Contributing Nutrition Diagnosis  Altered GI Function    Related to (etiology):   S/p surgery    Signs and Symptoms (as evidenced by):   NPO/intubated    Interventions:  Parenteral nutrition    Nutrition Diagnosis Status:   Improving (on regular diet)    Monitor and Evaluation    Food and Nutrient Intake: food and beverage intake  Food and Nutrient Adminstration: diet order  Knowledge/Beliefs/Attitudes: food and nutrition knowledge/skill  Physical Activity and Function: nutrition-related ADLs and IADLs  Anthropometric Measurements: weight  Biochemical Data, Medical Tests and Procedures: electrolyte and renal panel, glucose/endocrine profile, lipid profile, inflammatory profile, gastrointestinal profile  Nutrition-Focused Physical Findings: overall appearance     Malnutrition Assessment    [place] (Muscle Loss): mild depletion  Clavicle Bone Region (Muscle Loss): mild depletion     Nutrition Follow-Up    RD Follow-up?: Yes

## 2019-11-12 NOTE — PROGRESS NOTES
[facility]  Infectious Disease  Progress Note  Pt Name: Annette Garcia   Room: K460/K460 A  Admitted On: 10/27/2019  Today: 2019    Subjective:       No new complaints    Objective:   Last 24 Hour Vital Signs:  BP  Min: 114/58  Max: 192/75  Temp  Av.7 °F (37.1 °C)  Min: 98.4 °F (36.9 °C)  Max: 99.2 °F (37.3 °C)  Pulse  Av.8  Min: 67  Max: 79  Resp  Av.7  Min: 16  Max: 22  SpO2  Av.2 %  Min: 91 %  Max: 94 %  Height  Av' (152.4 cm)  Min: 5' (152.4 cm)  Max: 5' (152.4 cm)  Weight  Av.4 kg (157 lb 6.5 oz)  Min: 71.4 kg (157 lb 6.5 oz)  Max: 71.4 kg (157 lb 6.5 oz)  I/O last 3 completed shifts:  In: 710 [P.O.:560; IV Piggyback:150]  Out: 3300 [Urine:3200; Stool:100]    Physical Examination:    Const: Well-appearing, in NAD, very hoarse.  Cardio: RRR, no murmurs, 2+ peripheral pulses   Pulm: Lungs clear to auscultation bilaterally, normal work of breathing   Abdomen: Soft, mild tenderness to palpation, non-distended, normoactive bowel sounds.  Ostomy in place with brown stool output.  Midline incision healing well.  Extremities: No peripheral edema  Skin: Warm and dry, developing rash bilateral armpits.    Neuro: Alert and oriented x 3. No focal deficits.      Laboratory:    Hematology:  Recent Labs   Lab 19  0541 19  0622 19  0356   WBC 8.75 8.08 5.25   HGB 7.0* 7.0* 7.6*    200 277   * 103* 107*   RDW 15.6* 15.8* 17.5*     Chemistry:  Recent Labs   Lab 19  0245  19  0541 19  0622 19  0539 11/10/19  0546 19  0356 19  0643 19  0644     --  142 141 141 139 139 136  --    K 3.5  --  3.4* 3.6 3.3* 3.4* 3.7 3.4*  --      --  106 109 106 105 103 99  --    CO2 22*  --  25 24 26 28 29 29  --    BUN 71*  --  55* 36* 24* 15 11 11  --    CREATININE 2.4*  --  1.9* 1.4 1.1 0.9 0.8 0.8  --    *  --  100 116* 124* 105 117* 95  --    CALCIUM 6.5*  --  6.6* 6.6* 6.8* 7.3* 7.3* 8.1*  --     PROT 4.6*  --  4.7* 4.7* 5.0* 5.1*  --   --   --    ALBUMIN 2.1*  --  2.2* 2.2* 2.4* 2.5*  --   --   --    ALT 8*  --  5* 5* 5* <5*  --   --   --    AST 42*  --  40 39 30 28  --   --   --    ALKPHOS 112  --  100 87 87 77  --   --   --    MG  --    < > 1.4* 1.4* 1.9 1.2* 1.8  --  1.3*   PHOS  --   --  4.1 2.9 2.2* 2.1* 2.5*  --  2.8    < > = values in this interval not displayed.     DM:  Recent Labs   Lab 11/10/19  0546 11/11/19  0356 11/12/19  0643    117* 95     Microbiology:  Microbiology Results (last 7 days)     Procedure Component Value Units Date/Time    Blood culture [[id number]] Collected:  11/01/19 1516    Order Status:  Completed Specimen:  Blood Updated:  11/06/19 1822     Blood Culture, Routine No growth after 5 days.    Narrative:       Collection has been rescheduled by AC2 at 11/01/2019 14:23 Reason: pt   recieving unit  Collection has been rescheduled by AC2 at 11/01/2019 14:23 Reason: pt   recieving unit    Blood culture [[id number]] Collected:  11/01/19 1335    Order Status:  Completed Specimen:  Blood Updated:  11/06/19 1822     Blood Culture, Routine No growth after 5 days.        Antibiotics and Day Number of Therapy:  Antibiotics (From admission, onward)    Start     Stop Route Frequency Ordered    11/12/19 0900  cephALEXin capsule 500 mg      -- Oral 4 times daily 11/11/19 2108    11/06/19 1700  metroNIDAZOLE tablet 500 mg      -- Oral Every 8 hours 11/06/19 1614        Assessment/Plan:     Ms. Garcia is an 83 yo female with E. Coli sepsis 10/28 s/p sigmoid colectomy for bowel necrosis on 10/29/19. Repeat BClx negative 11/1/19.  She has multiple allergies to antibiotics; vanco discontinued 11/2; ertapenem discontinued 11/3; cefazolin started 11/3 and flagyl started 11/3.      Rec:  - at discharge switch cefazolin to po keflex 500 mg q6h  - continue flagyl 500mg po tid   - recommend 2 weeks antibiotics stop date 11/15 for both   - fluconazole 100 mg q24 for 4 days for superficial  yeast infection    Infectious Disease  Karel Seymour  [employer]    Will sign off

## 2019-11-12 NOTE — PLAN OF CARE
Future Appointments   Date Time Provider Department Center   11/27/2019  3:20 PM Chris Johnson MD [facility] GENSUR [facility]   12/6/2019  1:00 PM Carleen Estrada MD [facility] UROLOGY [facility]   12/12/2019 10:00 AM Selam Mills MD [facility] MAREN [facility]   12/17/2019  9:40 AM Jose Valles MD [facility]   4/2/2020 11:00 AM CHAIR [facility] CHEMO [facility]     Phil Vallejo RN, CM  [phone]

## 2019-11-12 NOTE — ASSESSMENT & PLAN NOTE
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      Plan:  Continue abx; per ID stop date for IV cefazolin/metronidazole 11/15/19 (per ID ok for both to be switched to po)  Per ID starting po fluconazole for superficial yeast infection under arms (4 day course)  Nephrology signed off on 11/8, gonzales in place for urinary retention. Following up with neph in clinic 2-3 weeks post-dc.  PT/OT  Continue regular diet, boost, suplena   OOBTC/sit on edge of bed, to chair with PT as able. Rec d/c to SNF when medically able.

## 2019-11-12 NOTE — PLAN OF CARE
Recommendations    Recommendation/Intervention:   1. Encourage PO intake of meals and supplements.     Goals:   1. >75% of EEN, EPN by RD follow up.   Nutrition Goal Status: new

## 2019-11-12 NOTE — PLAN OF CARE
Problem: Physical Therapy Goal  Goal: Physical Therapy Goal  Description  Goals to be met by:      Patient will increase functional independence with mobility by performin. Supine to sit with Moderate Assistance-Met 19  REVISED: CG/Lina  2. Sit to supine with Moderate Assistance  3. Rolling to Left and Right with Moderate Assistance.-met 19  REVISED: CGA  4. Sit to stand transfer with Moderate Assistance using RW  5. Bed to chair transfer with Minimal Assistance and Moderate Assistance using Rolling Walker-met 19  REVISED: CG/Lina  6. Gait  x 25 feet with Minimal Assistance and Moderate Assistance using Rolling Walker.   7. Lower extremity exercise program x10 with active BLE with assistance as needed      Outcome: Ongoing, Progressing   Patient continues to progress toward goals; cont to recommend SNF to maximize functional status.

## 2019-11-12 NOTE — SUBJECTIVE & OBJECTIVE
Interval History: No acute events overnight. VSS, AF. Hypertensive yesterday afternoon but improved with prns. No abdominal pain; ostomy continues to be functional. Still tolerates regular diet without n/v, but eating very little. States the food is not very palatable to her. Trying to drink boost shakes. Per patient may be looking at possible placement at SNF today.    Medications:  Continuous Infusions:    Scheduled Meds:   calcium carbonate  1,000 mg Oral TID    carvedilol  25 mg Oral BID    ceFAZolin (ANCEF) IVPB  2 g Intravenous Q8H    cephALEXin  500 mg Oral QID    cloNIDine  0.1 mg Oral BID    epoetin moe-ebpx (RETACRIT) injection  20,000 Units Subcutaneous Every Tues, Thurs, Sat    fluconazole  100 mg Oral Daily    heparin (porcine)  5,000 Units Subcutaneous Q8H    iron sucrose  100 mg Intravenous Every Mon, Wed, Fri    levothyroxine  125 mcg Oral Before breakfast    metroNIDAZOLE  500 mg Oral Q8H    miconazole NITRATE 2 %   Topical (Top) BID    OXcarbazepine  300 mg Oral QHS    PHENobarbital  64.8 mg Oral Nightly    pravastatin  20 mg Oral Daily     PRN Meds:acetaminophen, albuterol-ipratropium, heparin, porcine (PF), morphine, ondansetron, promethazine (PHENERGAN) IVPB, ramelteon, sodium chloride 0.9%     Review of patient's allergies indicates:   Allergen Reactions    Adhesive Itching and Blisters    Penicillins Anaphylaxis    Tramadol Hives    Avelox [moxifloxacin] Rash     Facial and arm itching and redness. Pt states throat closes when given.    Amoxil [amoxicillin]     Aspridrox [aspirin, buffered]     Codeine Other (See Comments)     Throat swelling    Keflex [cephalexin]      Tolerated cefepime and cefazolin    Norvasc [amlodipine]     Red dye Hives    Robitussin [guaifenesin]     Sulfa (sulfonamide antibiotics)     Tylenol [acetaminophen]      Has reaction to Tylenol with red dye and unable to take Extra Strength Tylenol/ CAN ONLY TOLERATE REG STRENGTH TYLENOL    Vicks  vaporub [camphor-eucalyptus oil-menthol]      Objective:     Vital Signs (Most Recent):  Temp: 98.7 °F (37.1 °C) (11/12/19 0726)  Pulse: 69 (11/12/19 0726)  Resp: 16 (11/12/19 0413)  BP: (!) 162/68 (11/12/19 0726)  SpO2: (!) 94 % (11/12/19 0726) Vital Signs (24h Range):  Temp:  [98.4 °F (36.9 °C)-99.2 °F (37.3 °C)] 98.7 °F (37.1 °C)  Pulse:  [67-79] 69  Resp:  [16-22] 16  SpO2:  [91 %-94 %] 94 %  BP: (135-192)/(60-75) 162/68     Weight: 71.4 kg (157 lb 6.5 oz)  Body mass index is 30.74 kg/m².    Intake/Output - Last 3 Shifts       11/10 0700 - 11/11 0659 11/11 0700 - 11/12 0659 11/12 0700 - 11/13 0659    P.O. 650 60     I.V. (mL/kg) 100 (1.5)      IV Piggyback 50 150     Total Intake(mL/kg) 800 (11.7) 210 (2.9)     Urine (mL/kg/hr) 2350 (1.4) 2100 (1.2)     Stool 200 100     Total Output 2550 2200     Net -1750 -1990            Stool Occurrence 1 x 1 x           Physical Exam   Constitutional: She is oriented to person, place, and time. She appears well-developed. No distress.   HENT:   Head: Normocephalic and atraumatic.   Weak voice.   Neck: Normal range of motion. Neck supple.   Cardiovascular: Normal rate and regular rhythm.   Abdominal: Soft. There is no guarding.   Ostomy with stool/gas in bag.   Musculoskeletal: She exhibits no tenderness.   Neurological: She is alert and oriented to person, place, and time.   Skin: Skin is warm and dry.   Rash under arms.   Psychiatric: She has a normal mood and affect. Her behavior is normal. Judgment and thought content normal.   Nursing note and vitals reviewed.      Significant Labs:  CBC:   Recent Labs   Lab 11/11/19  0356   WBC 5.25   RBC 2.33*   HGB 7.6*   HCT 24.9*      *   MCH 32.6*   MCHC 30.5*     CMP:   Recent Labs   Lab 11/10/19  0546  11/12/19  0643      < > 95   CALCIUM 7.3*   < > 8.1*   ALBUMIN 2.5*  --   --    PROT 5.1*  --   --       < > 136   K 3.4*   < > 3.4*   CO2 28   < > 29      < > 99   BUN 15   < > 11   CREATININE 0.9    < > 0.8   ALKPHOS 77  --   --    ALT <5*  --   --    AST 28  --   --    BILITOT 0.3  --   --     < > = values in this interval not displayed.         Significant Diagnostics:  I have reviewed all pertinent imaging results/findings within the past 24 hours.

## 2019-11-12 NOTE — PT/OT/SLP PROGRESS
Occupational Therapy   Treatment    Name: Annette Garcia  MRN: [id number]  Admitting Diagnosis:  Colon necrosis  13 Days Post-Op    Recommendations:     Discharge Recommendations: nursing facility, skilled  Discharge Equipment Recommendations:  (Defer to SNF)  Barriers to discharge:  Decreased caregiver support    Assessment:   Patient continues to make progress with activity tolerance and ability to ambulate. Patient will benefit from SNF upon D/C.     Annette Garcia is a 84 y.o. female with a medical diagnosis of Colon necrosis. Performance deficits affecting function are weakness, impaired endurance, impaired self care skills, impaired functional mobilty, gait instability, impaired balance, decreased upper extremity function, decreased lower extremity function, decreased safety awareness, decreased ROM, impaired skin.     Rehab Prognosis:  Fair+; patient would benefit from acute skilled OT services to address these deficits and reach maximum level of function.       Plan:     Patient to be seen 5 x/week to address the above listed problems via self-care/home management, therapeutic activities, therapeutic exercises  · Plan of Care Expires: 12/01/19  · Plan of Care Reviewed with: patient, family    Subjective     Pain/Comfort:  · Pain Rating 1: 0/10  · Pain Rating Post-Intervention 1: 0/10    Objective:     Communicated with: nurseVania prior to session.  Patient found HOB elevated with bed alarm, central line, telemetry, gonzales catheter, colostomy upon OT entry to room.    General Precautions: Standard, aspiration, fall, vision impaired   Orthopedic Precautions:N/A   Braces: N/A     Bed Mobility:    · Patient completed Rolling/Turning to Left with  contact guard assistance, minimum assistance and with side rail  · Patient completed Rolling/Turning to Right with minimum assistance and with side rail  · Patient completed Scooting/Bridging with contact guard assistance, minimum assistance and increased  time/effort  · Patient completed Supine to Sit with minimum assistance, with side rail and increased time/effort     Functional Mobility/Transfers:  · Patient completed Sit <> Stand Transfer with minimum assistance and of 2 persons  with  rolling walker   · Patient completed Bed <> Chair Transfer using Step Transfer technique with minimum assistance, moderate assistance and of 2 persons with rolling walker    Activities of Daily Living:  · Upper Body Dressing: contact guard assistance      AMPA 6 Click ADL: 14    Treatment & Education:  Patient upset about being soiled. Bed mob as above for cleaning. Transitioned to EOB and demos flexed, kyphotic posture and VCs to correct. Patient stood and t/f to b/s chair using RW /c min-modA of 2. Completed short gait training /c PT -- see PT note.     Patient left up in chair with all lines intact, call button in reach, nurse notified and P.T. presentEducation:      GOALS:   Multidisciplinary Problems     Occupational Therapy Goals        Problem: Occupational Therapy Goal    Goal Priority Disciplines Outcome Interventions   Occupational Therapy Goal     OT, PT/OT Ongoing, Progressing    Description:  Goals to be met by: 12/1     Patient will increase functional independence with ADLs by performing:  MET 11/1/19 Pt will maintain gaze x 30 secs  MET 11/1/19 Pt will follow 1 step commands 25% of the time  Pt's family will be indep w/PROM -- no longer applicable, patient alert and /c AROM in BUEs    New Goals set 11/4/2019  Pt will complete AROM shoulder flex/ext through 75% of available AROM.  Pt will complete simple grooming task min A seated with HOB elevated --MET 11/11                             Time Tracking:     OT Date of Treatment: 11/12/19  OT Start Time: 1107  OT Stop Time: 1138  OT Total Time (min): 31 min    Billable Minutes:Therapeutic Activity 23    MARY Rothman  11/12/2019

## 2019-11-12 NOTE — DISCHARGE SUMMARY
[facility] Medicine  Discharge Summary      Patient Name: Annette Garcia  MRN: [id number]  Admission Date: 10/27/2019  Hospital Length of Stay: 15 days  Discharge Date and Time: 11/12/2019  3:05 PM  Attending Physician: José Manuel Guidry MD   Discharging Provider: José Manuel Guidry MD  Primary Care Provider: Jose Valles MD      HPI:   Annette Garcia is an 84 year old white woman with peripheral vascular disease, renovascular hypertension, renal artery stenosis status post left renal artery stent placement on 7/19/17, coronary artery disease with history of myocardial infarction, diffuse large B cell lymphoma, anemia, epilepsy, hypothyroidism, chronic hyponatremia, depression, lumbar and sacroiliac joint osteoarthritis with chronic low back pain and history of lumbar spine surgery in 2013, slow transit constipation, history of appendectomy, history of cholecystectomy history hysterectomy, history of small bowel obstruction status post small bowel resection on 8/23/16. She lives in [place], [place]. Her son has epilepsy. Her daughter has tuberous sclerosis. Her primary care physician is Dr. Jose Torres. Her pain management specialist is Dr. Chirag Bates. Her neurologist is Dr. Gamal Lock.    She presented to [facility] on 10/27/19 with abdominal pain, difficulty urinating, and constipation for one day. She took polyethylene glycol and senna-docusate without relief. She strained during her last attempt at having a bowel movement. She had one episode of vomiting on the day of presentation. She felt weak when walking. In the emergency department, she was found to have acute kidney injury (BUN 27, creatinine 1.6, from 9 and 0.7 on 8/29/19), elevated transaminases ( and , from 16 and 11 on 8/29/19), and lactic acidosis (5.8). Contrast CT showed acute enteritis, sigmoid and rectal constipation, and some peritoneal free fluid in the  pericolic gutters.she was given lactated ringers, cefepime, and metronidazole. She required norepinephrine for septic shock. She was admitted to [facility].    Procedure(s) (LRB):  Insertion, Pacemaker, Temporary Transvenous (Right)      Hospital Course:   Blood cultures grew pan-sensitive Escherichia coli. Due to history of bowel resection for obstruction, General Surgery was consulted for her bowel obstruction. She was put on amikacin and cefepime. 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 She was kept intubated postoperatively. Hospital Medicine changed amikacin to metronidazole, then later stopped metronidazole and changed cefepime to meropenem. She was given total parenteral nutrition. She became oliguric but responded well to a dose of furosemide, so Nephrology gave her doses of bumetanide. She developed thrombocytopenia. She developed atrial fibrillation and sinus pauses, so Cardiology was consulted and placed a temporary transvenous pacemaker. Echocardiogram only showed concentric remodeling and mild mitral regurgitation. Norepinephrine was changed to dopamine. EEG showed triphasic waves bilaterally consistent with metabolic encephalopathy. The epileptologist recommended treating her hyponatremia. She required pRBC transfusion. Nephrology improved her hyponatremia, and encephalopathy resolved. She was weaned off vasopressors and was extubated the morning of 11/2/19. Surgery stopped TPN and started full liquid diet on 11/3/19. Ertapenem was changed to cefazolin but Infectious Disease restarted metronidazole to continue empiric anaerobe coverage. Cardiology removed her temporary pacemaker on 11/4/19. A Martinez catheter was placed on 11/5/19 for urinary retention and she will follow up with Urology. She was found to have right vocal cord paralysis and will continue Speech Therapy. Infectious Disease recommended  finishing cephalexin, metronidazole, and fluconazole on 11/15/19. She was accepted to [facility] skilled nursing facility on 11/12/19.      Consults:   Consults (From admission, onward)        Status Ordering Provider     Inpatient consult to [facility]  Once     Provider:  (Not yet assigned)    Completed ABADCO KELLI A     Inpatient consult to ENT  Once     Provider:  Silvia Jimenez MD    Acknowledged SANTO MASTERSEMIAH H.     Inpatient consult to General Surgery  Once     Provider:  Marcial Mcintyre Jr., MD    Acknowledged SANTO MASTERSEMIAH H.     Inpatient consult to General Surgery  Once     Provider:  Chris Johnson MD    Acknowledged SANTO MASTERSEMIAH H.     Inpatient consult to Hematology/Oncology  Once     Provider:  Tyson Arredondo MD    Completed SHAYE JONES     Inpatient consult to Infectious Diseases  Once     Provider:  [facility] - Infectious Disease/[facility]    Completed SHAYE JONES     Inpatient consult to [facility] (Akanksha Duckworth Nimkevych)  Once     Provider:  Shaye Jones MD    Completed CHAVA SKAGGS     Inpatient consult to Pulmonology  Once     Provider:  (Not yet assigned)    Completed IQRA BEEIN A     Inpatient consult to Registered Dietitian/Nutritionist  Once     Provider:  (Not yet assigned)    Completed IQRA BEEIN A     Inpatient consult to [facility]  Once     Provider:  (Not yet assigned)    Acknowledged PATY MASTERS HJacqueline     Pharmacy to dose Aminoglycosides consult  Once     Provider:  (Not yet assigned)    Completed PAULA AGUILAR        Final Active Diagnoses:    Diagnosis Date Noted POA    PRINCIPAL PROBLEM:  Colon necrosis [K55.049] 10/27/2019 Yes    Hypomagnesemia [E83.42] 11/10/2019 No    Paralysis of right vocal cord [J38.01] 11/09/2019 No    Hoarseness [R49.0] 11/07/2019 No    Acute urinary retention [R33.8] 11/04/2019 Yes    Anemia of acute infection  [D64.9] 11/01/2019 No    E coli bacteremia [R78.81] 10/29/2019 Yes    Old MI (myocardial infarction) [I25.2] 10/28/2019 Not Applicable     Chronic    Peripheral vascular disease, unspecified [I73.9] 10/28/2019 Yes     Chronic    Slow transit constipation [K59.01] 10/28/2019 Yes     Chronic    Depression [F32.9] 10/17/2019 Yes    History of stent insertion of left renal artery 7/19/17 [Z98.890] 07/19/2017 Not Applicable     Chronic    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, T45.1X5A] 12/27/2016 Yes     Chronic    DLBCL (diffuse large B cell lymphoma) [C83.30] 09/09/2016 Yes     Chronic    Acquired hypothyroidism [E03.9] 08/18/2016 Yes     Chronic    Renovascular hypertension [I15.0] 08/18/2016 Yes     Chronic    Epilepsy [G40.909] 08/18/2016 Yes     Chronic      Problems Resolved During this Admission:    Diagnosis Date Noted Date Resolved POA    Hypokalemia [E87.6] 11/10/2019 11/12/2019 No    Hypophosphatemia [E83.39] 11/10/2019 11/12/2019 No    JAS (acute kidney injury) [N17.9] 11/06/2019 11/11/2019 Yes    Thrombocytopenia [D69.6] 11/01/2019 11/07/2019 No    Enteritis [K52.9]  11/12/2019 Yes    Lactic acidosis [E87.2]  11/02/2019 Yes    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 coli septic shock [A41.51, R65.21] 10/28/2019 11/02/2019 Yes    Abnormal liver enzymes [R74.8] 10/28/2019 11/02/2019 Yes    Fecal obstruction [K56.41]  11/02/2019 Yes    Chronic hyponatremia [E87.1] 10/12/2016 11/09/2019 Yes     Chronic       Discharged Condition: good    Disposition: Skilled Nursing Facility    Follow Up:  Follow-up Information     Chris Johnson MD In 1 week.    Specialties:  Surgery, General Surgery  Contact information:  [address]  [address]  [place]  [address]  [phone]             [facility] Otorhinolaryngology In 1 week.    Specialty:  Otolaryngology  Contact information:  [address]  [facility] [address] [phone]           [facility] Urology In 1 week.    Specialty:  Urology  Contact information:  [address], [address]  [facility] [address] [phone]  Additional information:  [address]nd Floor [facility] [address]               Patient Instructions:      Diet Adult Regular     Vital signs per facility protocol     Skin assessment every shift      Notify Physician     Order Specific Question Answer Comments   Temperature (F) greater than 101    Systolic Blood Pressure SBP greater than or equal to 160    Systolic Blood Pressure SBP less than or equal to 90    Diastolic Blood Pressure DBP greater than or equal to 110    Diastolic Blood Pressure DBP less than or equal to 60    Pulse greater than or equal to 120    Pulse less than or equal to 50    Respirations Rate RR greater than or equal to 30    Respirations Rate RR less than or equal to 6    Urine output less than 60 over 2 hours   SPO2% less than 90      Full code       Significant Diagnostic Studies:   CT Abdomen Pelvis With Contrast 10/27/19: FINDINGS:  The visualized portion of the heart is unremarkable.  Stable clustering of nodules is seen within the right lung base.  Lung bases show no evidence of focal consolidation or pleural effusion.  No significant hepatic abnormalities are identified.  There is minimal intrahepatic biliary ductal dilatation.  Gallbladder has been removed.  Stomach is distended with air-fluid level seen.  Fluid is seen within the distal esophagus which may relate to gastroesophageal reflux.  Kidneys enhance normally with no evidence of hydronephrosis.  Urinary bladder is unremarkable.  Uterus has been removed.  Postsurgical dural changes of partial bowel resection are seen.  There is long segment of abnormal small bowel wall thickening with  increased mucosal enhancement consistent with acute enteritis.  No surrounding inflammatory changes are seen.  No evidence of abnormal small bowel dilatation or obstruction.  Appendix is not visualized and may have been removed.  Prominent stool is seen within the colon with large volume retained stool seen within the distal sigmoid colon and rectum.  No free air identified.  Small volume peritoneal fluid is seen along the bilateral pericolic gutters.  Aorta tapers normally with prominent atherosclerosis.  Left renal artery stent is seen.  No acute osseous abnormality identified.  Multilevel degenerative changes are seen within the lumbar spine with lower lumbar laminectomies seen.  Remote compression fractures are seen of the T11 and L2 vertebral bodies.  Subcutaneous soft tissue structures are unremarkable.  Impression:  1. Long segment abnormal small bowel wall thickening with increased mucosal enhancement consistent with acute enteritis.  No evidence of abnormal small bowel dilatation or obstruction.  2. Prominent retained stool seen throughout the colon with large volume stool seen within the sigmoid colon and rectum.  Correlate for possible constipation and fecal impaction.  3. Small volume peritoneal free fluid seen along the bilateral pericolic gutters.  4. Postsurgical changes and multiple additional findings as detailed above.  X-Ray Chest 1 View 10/28/19: FINDINGS:  Single portable chest view is submitted.  Right-sided subcutaneous central venous catheter again noted, in the interim a right-sided internal jugular central venous catheter are appears to have been placed, the distal tip of both of these appears at the expected location of the SVC.  There is diminished depth of inspiration and mild rotation when accounting for this the cardiomediastinal silhouette appears stable.  Accentuated pulmonary bronchovascular markings consistent with diminished depth of inspiration noted.  There is no evidence for  confluent infiltrate or consolidation, significant pleural effusion or pneumothorax.  The osseous structures demonstrate chronic change including prominent chronic appearance of the right humeral head, and vertebral body height loss along the distal thoracic spine.  Impression:  Right-sided central venous catheters are noted distal tip superior at the level of the expected location of the SVC.  Diminished depth of inspiration and chronic change noted without additional evidence for superimposed acute intrathoracic process.  X-Ray Abdomen AP 1 View 10/28/19: FINDINGS:  Diffuse moderate/severe dilatation of the transverse, descending and sigmoid colon as well as rectum which are air and stool filled.  No appreciable small bowel dilatation or air-fluid levels.  No secondary signs of pneumoperitoneum or evidence of pneumatosis.  Multilevel degenerative changes of the imaged spine.  Impression: Diffuse moderate/severe dilatation of the distal half of the colon and rectum which are air and stool-filled without evidence of small bowel a dilatation which could correlate with the clinical diagnosis of rectal fecal impaction and constipation.  No evidence for pneumatosis or secondary signs of pneumoperitoneum.  X-Ray Chest 1 View 10/29/19: FINDINGS:  Single chest view is submitted.  There is diminished depth of inspiration and mild rotation exaggerating the appearance of the cardiomediastinal silhouette.  There is also accentuation of pulmonary bronchovascular markings associated with diminished depth of inspiration.  Mild atelectatic change noted at the lung bases.  Enteric tube noted the distal tip is subdiaphragmatic overlying the upper abdomen.  Right-sided central venous catheters are again noted, stable.  The osseous structures demonstrate chronic change.  Impression:  Enteric tube noted, distal tip is subdiaphragmatic.  Diminished depth of inspiration, radiographic appearance of crowding with mild atelectatic change  noted at the lung bases.  TRANSTHORACIC ECHO (TTE) COMPLETE 10/30/19:  · Normal left ventricular systolic function. The estimated ejection fraction is 55%  · No wall motion abnormalities.  · Concentric left ventricular remodeling.  · Normal right ventricular systolic function.  · Mild mitral regurgitation.  · Diastolic pattern consistent with atrial fibrillation observed.  Electroencephalogram 10/31/19:  EEG FINDINGS  Background activity:   The background rhythm was characterized by sharply contoured delta-theta (2-5 Hz) activity   No posterior dominant rhythm seen.   Symmetry and continuity: the background was continuous and symmetric  Sleep:    Not seen.  Activation procedures:   Photic stimulation and hyperventilation were not performed  Responsive to verbal stimuli with reactivity noted on EEG.  Abnormal activity:   Abundant bilateral broad sharp waves of triphasic morphology are seen in a periodic fashion, without evolution.  No electrographic seizures were seen.  Impression:  IMPRESSION:   This is an abnormal extended (1 hour and 4 minutes) EEG due to:  1) abundant bilateral periodic discharges seen of triphasic morphology, likely of metabolic etiology  2) severe generalized slowing seen, suggestive of severe diffuse or multifocal cerebral dysfunction.     Medications:  Reconciled Home Medications:      Medication List      START taking these medications    cephALEXin 500 MG capsule  Commonly known as:  KEFLEX  Take 1 capsule (500 mg total) by mouth 4 (four) times daily. End date 11/15/19. for 3 days     fluconazole 100 MG tablet  Commonly known as:  DIFLUCAN  Take 1 tablet (100 mg total) by mouth once daily. End date 11/15/19. for 2 days  Start taking on:  November 13, 2019     metroNIDAZOLE 500 MG tablet  Commonly known as:  FLAGYL  Take 1 tablet (500 mg total) by mouth every 8 (eight) hours. End date 11/15/19. for 3 days        CHANGE how you take these medications    calcium carbonate 600 mg calcium (1,500  mg) Tab  Commonly known as:  OS-RICHARDSON  Take 0.5 tablets (300 mg total) by mouth once daily.  What changed:    · how much to take  · when to take this     magnesium 250 mg Tab  Take 1 tablet (250 mg total) by mouth once daily.  What changed:    · how much to take  · when to take this        CONTINUE taking these medications    acetaminophen 325 MG tablet  Commonly known as:  TYLENOL  Take 2 tablets (650 mg total) by mouth every 4 (four) hours as needed for Pain.     carvedilol 25 MG tablet  Commonly known as:  COREG  Take 1 tablet (25 mg total) by mouth 2 (two) times daily with meals.     cloNIDine 0.2 MG tablet  Commonly known as:  CATAPRES  Take 1 tablet (0.2 mg total) by mouth 2 (two) times daily.     levothyroxine 125 MCG tablet  Commonly known as:  SYNTHROID  TAKE 1 TABLET (125 MCG TOTAL) BY MOUTH ONCE DAILY.     lisinopril 40 MG tablet  Commonly known as:  PRINIVIL,ZESTRIL  Take 1 tablet (40 mg total) by mouth once daily.     ondansetron 4 MG tablet  Commonly known as:  ZOFRAN  Take 1 tablet (4 mg total) by mouth every 8 (eight) hours as needed for Nausea.     OXcarbazepine 300 MG Tab  Commonly known as:  TRILEPTAL  Take 1 tablet (300 mg total) by mouth nightly.     PHENobarbital 64.8 MG tablet  Commonly known as:  LUMINAL  TAKE 1 TABLET (64.8 MG TOTAL) BY MOUTH EVERY EVENING.     polyethylene glycol 17 gram Pwpk  Commonly known as:  GLYCOLAX  Take 17 g by mouth 2 (two) times daily as needed (Constipation).     pravastatin 20 MG tablet  Commonly known as:  PRAVACHOL  Take 1 tablet (20 mg total) by mouth once daily.     PROLIA 60 mg/mL Syrg  Generic drug:  denosumab  Inject 60 mg into the skin every 6 (six) months.     triamcinolone acetonide 0.1% 0.1 % cream  Commonly known as:  KENALOG  Apply topically 2 (two) times daily. When needed for sores on her head     vitamin D 1000 units Tab  Commonly known as:  VITAMIN D3  Take 1,000 Units by mouth once daily.        STOP taking these medications    mirtazapine 7.5 MG  Tab  Commonly known as:  REMERON     SHINGRIX (PF) 50 mcg/0.5 mL injection  Generic drug:  varicella-zoster [id number] (PF)        ASK your doctor about these medications    oxybutynin 5 MG Tab  Commonly known as:  DITROPAN  One po every 6 hours as need for bladder irritation            Indwelling Lines/Drains at time of discharge:   Lines/Drains/Airways         Drain                 Colostomy 10/29/19 1200 LLQ 13 days         Urethral Catheter 11/05/19 0945 Straight-tip 7 days          Pressure Ulcer                 Pressure Injury 11/07/19 1300 Coccyx Stage 1 4 days                Time spent on the discharge of patient: 35 minutes  Patient was seen and examined on the date of discharge and determined to be suitable for discharge.         José Manuel Guidry MD  Department of Hospital Medicine  [facility]

## 2019-11-12 NOTE — NURSING
Cued into patient's room for evening rounds. Patient appears asleep. Eyes closed. Arousable to verbal stimuli. Open eyes but quickly closes them. Side rails x3 up with bed alarm activated and is on for patient safety. Will continue to be available as needed to [atient and nurse for patient care

## 2019-11-12 NOTE — PLAN OF CARE
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]  No    Hoarseness [R49.0]  No    Acute urinary retention [R33.8]  Yes     Martinez removed 11/4/19, had 500 cc on bladder scan after several hours      Anemia of acute infection [D64.9]  No    E coli bacteremia [R78.81]  Yes    Old MI (myocardial infarction) [I25.2]  Not Applicable     Chronic    Peripheral vascular disease, unspecified [I73.9]  Yes     Chronic    Slow transit constipation [K59.01]  Yes     Chronic    Depression [F32.9]  Yes    History of stent insertion of left renal artery 7/19/17 [Z98.890]  Not Applicable     Chronic    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, T45.1X5A]  Yes     Chronic    DLBCL (diffuse large B cell lymphoma) [C83.30]  Yes     Chronic    Acquired hypothyroidism [E03.9]  Yes     Chronic    Renovascular hypertension [I15.0]  Yes     Chronic    Epilepsy [G40.909]  Yes     Chronic     Epilepsy type unknown        Resolved Hospital Problems    Diagnosis Date Resolved POA    Hypokalemia [E87.6] 11/12/2019 No    Hypophosphatemia [E83.39] 11/12/2019 No    JAS (acute kidney injury) [N17.9] 11/11/2019 Yes    Thrombocytopenia [D69.6] 11/07/2019 No    Enteritis [K52.9] 11/12/2019 Yes    Lactic acidosis [E87.2] 11/02/2019 Yes    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 coli septic shock [A41.51, R65.21] 11/02/2019 Yes    Abnormal liver enzymes [R74.8] 11/02/2019 Yes    Fecal obstruction [K56.41] 11/02/2019 Yes    Chronic hyponatremia [E87.1] 11/09/2019 Yes     Chronic     Allergies:  Review of patient's allergies indicates:   Allergen Reactions    Adhesive Itching and Blisters    Penicillins Anaphylaxis    Tramadol Hives    Avelox [moxifloxacin] Rash     Facial and arm itching and redness. Pt states throat closes when given.    Amoxil [amoxicillin]     Aspridrox [aspirin, buffered]     Codeine Other (See Comments)     Throat swelling    Keflex [cephalexin]      Tolerated cefepime and cefazolin    Norvasc [amlodipine]     Red dye Hives    Robitussin [guaifenesin]     Sulfa (sulfonamide antibiotics)     Tylenol [acetaminophen]      Has reaction to Tylenol with red dye and unable to take Extra Strength Tylenol/ CAN ONLY TOLERATE REG STRENGTH TYLENOL    Vicks vaporub [camphor-eucalyptus oil-menthol]        Discharge Procedure Orders   Diet Adult Regular     Vital signs per facility protocol     Skin assessment every shift      Notify Physician     Order Specific Question Answer Comments   Temperature (F) greater than 101    Systolic Blood Pressure SBP greater than or equal to 160    Systolic Blood Pressure SBP less than or equal to 90    Diastolic Blood Pressure DBP greater than or equal to 110    Diastolic Blood Pressure DBP less than or equal to 60    Pulse greater than or equal to 120    Pulse less than or equal to 50    Respirations Rate RR greater than or equal to 30    Respirations Rate RR less than or equal to 6    Urine output less than 60 over 2 hours   SPO2% less than 90      Full code       LABS:  Per facility protocol    Nursing Precautions:     - Fall precautions per nursing home protocol      CONSULTS:      Physical Therapy to evaluate and treat     Occupational Therapy to evaluate and treat     Speech Therapy  to evaluate and  treat    MISCELLANEOUS CARE:     Martinez Care: Empty Martinez bag every shift.  Change Martinez every month. Follow up with Urology for voiding trial.           Colostomy Care:  Empty bag every shift and prn                                             Change and clean site every 48 hours    Medications:   Annette Garcia   Home Medication Instructions STEFANO:[id number]    Printed on:11/12/19 6945   Medication Information                      acetaminophen (TYLENOL) 325 MG tablet  Take 2 tablets (650 mg total) by mouth every 4 (four) hours as needed for Pain.             calcium carbonate (OS-RICHARDSON) 600 mg calcium (1,500 mg) Tab  Take 0.5 tablets (300 mg total) by mouth once daily.             carvedilol (COREG) 25 MG tablet  Take 1 tablet (25 mg total) by mouth 2 (two) times daily with meals.             cephALEXin (KEFLEX) 500 MG capsule  Take 1 capsule (500 mg total) by mouth 4 (four) times daily. End date 11/15/19.              cloNIDine (CATAPRES) 0.2 MG tablet  Take 1 tablet (0.2 mg total) by mouth 2 (two) times daily.             fluconazole (DIFLUCAN) 100 MG tablet  Take 1 tablet (100 mg total) by mouth once daily. End date 11/15/19.              levothyroxine (SYNTHROID) 125 MCG tablet  TAKE 1 TABLET (125 MCG TOTAL) BY MOUTH ONCE DAILY.             lisinopril (PRINIVIL,ZESTRIL) 40 MG tablet  Take 1 tablet (40 mg total) by mouth once daily.             magnesium 250 mg Tab  Take 1 tablet (250 mg total) by mouth once daily.             metroNIDAZOLE (FLAGYL) 500 MG tablet  Take 1 tablet (500 mg total) by mouth every 8 (eight) hours. End date 11/15/19.              ondansetron (ZOFRAN) 4 MG tablet  Take 1 tablet (4 mg total) by mouth every 8 (eight) hours as needed for Nausea.             OXcarbazepine (TRILEPTAL) 300 MG Tab  Take 1 tablet (300 mg total) by mouth nightly.             PHENobarbital (LUMINAL) 64.8 MG tablet  TAKE 1 TABLET (64.8 MG TOTAL) BY MOUTH EVERY EVENING.             polyethylene glycol (GLYCOLAX)  17 gram PwPk  Take 17 g by mouth 2 (two) times daily as needed (Constipation).             pravastatin (PRAVACHOL) 20 MG tablet  Take 1 tablet (20 mg total) by mouth once daily.             vitamin D (VITAMIN D3) 1000 units Tab  Take 1,000 Units by mouth once daily.                 Follow-up Information     Chris Johnson MD In 1 week.    Specialties:  Surgery, General Surgery  Contact information:  [address]  [address]  [facility] [address]  [phone]             [facility] Otorhinolaryngology In 1 week.    Specialty:  Otolaryngology  Contact information:  [address]  [facility] [address] [phone]           [facility] Urology In 1 week.    Specialty:  Urology  Contact information:  [address], [address]  [facility] [address] [phone]  Additional information:  [address]nd Floor [facility] [address]                   _________________________________  José Manuel Guidry MD  11/12/2019

## 2019-11-12 NOTE — PLAN OF CARE
11/12/19 1135   Final Note   Assessment Type Final Discharge Note   Anticipated Discharge Disposition SNF   Hospital Follow Up  Appt(s) scheduled? Yes   Discharge plans and expectations educations in teach back method with documentation complete? Yes   Right Care Referral Info   Post Acute Recommendation SNF / Sub-Acute Rehab   Referral Type snf   Facility Name [facility] City, State [place].    The Sw received a call from Jen at [facility] stating the pt's clear to arrive and he will be going to room 145A. The Sw spoke to the pt's nurse and informed her of the above mentioned info along with the pt's family who were standing outside the pt's room. The Sw requested a 2pm w/c van transport via [facility] for the pt. The Sw gave the pt's nurse the contact info to call report along with the copied chart.

## 2019-11-12 NOTE — PLAN OF CARE
I spoke with pt and her niece at her bedside.  They said Ernesto is signing paperwork at [facility] at 10:30.  They are requesting to speak with Dr. Guidry.  I spoke with Dr. Guidry and let him know family request and asked for facility transfer orders if pt is medically ready to transfer today.       11/12/19 1017   Post-Acute Status   Post-Acute Authorization Placement  ([facility])     Phil Vallejo RN,   [phone]

## 2019-11-12 NOTE — PT/OT/SLP PROGRESS
"Speech Language Pathology Treatment    Patient Name:  Annette Garcia   MRN:  [id number]  Admitting Diagnosis: Colon necrosis    Recommendations:                 General Recommendations:  Voice therapy  Diet recommendations:  Regular, Liquid Diet Level: Thin   Aspiration Precautions: Double swallow as needed, 1 bite/sip at a time, Alternating bites/sips, Avoid talking while eating, HOB to 90 degrees, Meds whole 1 at a time, Monitor for s/s of aspiration, Remain upright 30 minutes post meal, Small bites/sips and Standard aspiration precautions   General Precautions: Standard, aspiration, fall, vision impaired  Communication strategies:  Pt with significant vocal impairment, reduce background noise/environmental distractions and encourage loud/clear speech.    Subjective     Pt seen at the bedside for ongoing voice remediation. SLP did check w/ RN, Vania, prior to visit. Pt awake/alert and finishing bath with PCT upon entry. She was pleasant and agreeable to voice tx.  Patient goals: "Can I have a tissue."     Pain/Comfort:  · Pain Rating 1: 0/10    Objective:     Has the patient been evaluated by SLP for swallowing?   Yes  Keep patient NPO? No   Current Respiratory Status: room air      Pt repositioned to be higher up in bed given SLP and PCT assist. HOB elevated to 90*. SLP student present for tx. Pt c/o intermittent coughing/choking with food/drink. Pt agreeable to PO trials to ensure swallow safety. She consumed small bites dry cracker x2 and straw sips oral supplement x6. No overt coughing/choking noted with trials, though delayed coughing produced during voice tx. Pt with adequate oral and pharyngeal phases of the swallow with timely swallow initiation and good laryngeal lift/excursion. Swallow/voice anatomy and physiology reviewed with pt via explanation and diagram. Pt completed voice strengthening exercises including sustained vowels ("ah") for 2-3 second hold, hard glottic attacks via bearing down x10, " "syllable repetition "ah ah ah" x8, and pitch glide x5. Several breaks needed during phonation due to weakened glottic closure and vocal fatigue. Diaphragmatic breathing introduced as well as pursed lip breathing. SLP encouraged pt to take long, deep breaths to ensure stronger speech sounds. Pt completed breath exercise with tissue for biofeedback with good effort/production of hard breath. Pt verbalized understanding of taught material though will require reinforcement. Time allowed for questions. Following tx, pt's family member inquired re: pt progress. SLP reviewed events of today's session and goals of care.     Assessment:     Annette Garcia is a 84 y.o. female with an SLP diagnosis of Dysphonia and Aphonia.  She presents with continued breathy voice with reduced speech intelligibility. Pt with good effort and participation in tx this date and will continue to benefit from SLP care at next level of service. SLP to f/u while inpatient. Pt to cont on Regular/Thin liquid diet given standard aspiration precautions.     Goals:   Multidisciplinary Problems     SLP Goals        Problem: SLP Goal    Goal Priority Disciplines Outcome   SLP Goal     SLP Ongoing, Progressing   Description:  Goals:  1. Pt will participate in vocal function exercises in order to address unilateral vocal fold paralysis.                    Plan:     · Patient to be seen:  3 x/week   · Plan of Care expires:  12/09/19  · Plan of Care reviewed with:  patient, family   · SLP Follow-Up:  Yes       Discharge recommendations:  nursing facility, skilled   Barriers to Discharge:  None    Time Tracking:     SLP Treatment Date:   11/12/19  Speech Start Time:  0934  Speech Stop Time:  0952     Speech Total Time (min):  18 min    Billable Minutes: Speech Therapy Individual 18 mins    Pauline Pugh CCC-SLP  11/12/2019  "

## 2019-11-12 NOTE — NURSING
Pt discharge to [facility].  Report called and given to nurse Salas.  Pt in stable condition.  Daughter at bedside.  Pt in route in wheelchair.  NAD noted.

## 2019-11-12 NOTE — SUBJECTIVE & OBJECTIVE
Interval History: Continuing to work well with therapy. Prefers chocolate over vanilla. Says that she gets full quickly.     Review of Systems   Constitutional: Negative for chills and fever.   Respiratory: Negative for cough and shortness of breath.    Cardiovascular: Negative for chest pain and palpitations.   Gastrointestinal: Negative for nausea and vomiting.     Objective:     Vital Signs (Most Recent):  Temp: 98.7 °F (37.1 °C) (11/12/19 0413)  Pulse: 68 (11/12/19 0413)  Resp: 16 (11/12/19 0413)  BP: (!) 156/66 (11/12/19 0413)  SpO2: (!) 94 % (11/12/19 0413) Vital Signs (24h Range):  Temp:  [97.7 °F (36.5 °C)-99.2 °F (37.3 °C)] 98.7 °F (37.1 °C)  Pulse:  [67-79] 68  Resp:  [16-22] 16  SpO2:  [91 %-94 %] 94 %  BP: (135-192)/(60-75) 156/66     Weight: 71.4 kg (157 lb 6.5 oz)  Body mass index is 30.74 kg/m².    Intake/Output Summary (Last 24 hours) at 11/12/2019 0533  Last data filed at 11/11/2019 2330  Gross per 24 hour   Intake 210 ml   Output 1050 ml   Net -840 ml      Physical Exam   Constitutional: She is oriented to person, place, and time. She appears well-developed and well-nourished. No distress.   Cardiovascular: Normal rate and regular rhythm.   No murmur heard.  Pulmonary/Chest: Effort normal and breath sounds normal. No respiratory distress. She has no wheezes.   Abdominal: Soft. Bowel sounds are normal. She exhibits no distension. There is tenderness.   Musculoskeletal: She exhibits edema.   Neurological: She is alert and oriented to person, place, and time.   Skin: Ecchymosis noted.   Psychiatric: She has a normal mood and affect. Her behavior is normal.   Nursing note and vitals reviewed.      Significant Labs:   CBC:   Recent Labs   Lab 11/11/19  0356   WBC 5.25   HGB 7.6*   HCT 24.9*        CMP:   Recent Labs   Lab 11/10/19  0546 11/11/19 0356    139   K 3.4* 3.7    103   CO2 28 29    117*   BUN 15 11   CREATININE 0.9 0.8   CALCIUM 7.3* 7.3*   PROT 5.1*  --    ALBUMIN  2.5*  --    BILITOT 0.3  --    ALKPHOS 77  --    AST 28  --    ALT <5*  --    ANIONGAP 6* 7*   EGFRNONAA 60 >60     Magnesium:   Recent Labs   Lab 11/10/19  0546 11/11/19  0356   MG 1.2* 1.8       Significant Imaging: I have reviewed all pertinent imaging results/findings within the past 24 hours.

## 2019-11-12 NOTE — PLAN OF CARE
Problem: SLP Goal  Goal: SLP Goal  Description  Goals:  1. Pt will participate in vocal function exercises in order to address unilateral vocal fold paralysis.   Outcome: Ongoing, Progressing   11/12: Pt seen for ongoing voice remediation. She continues with weak voicing with reduced breath support. Pt c/o intermittent coughing with meals. She trialed thin liquids and hard solids without associated s/s of aspiration this date. Head/neck anatomy and physiology reviewed. Good effort with vocal exercises. Pt will continue to benefit from SLP tx.

## 2019-11-12 NOTE — PLAN OF CARE
Problem: Occupational Therapy Goal  Goal: Occupational Therapy Goal  Description  Goals to be met by: 12/1     Patient will increase functional independence with ADLs by performing:  MET 11/1/19 Pt will maintain gaze x 30 secs  MET 11/1/19 Pt will follow 1 step commands 25% of the time  Pt's family will be indep w/PROM -- no longer applicable, patient alert and /c AROM in BUEs    New Goals set 11/4/2019  Pt will complete AROM shoulder flex/ext through 75% of available AROM.  Pt will complete simple grooming task min A seated with HOB elevated --MET 11/11    Outcome: Ongoing, Progressing     Patient continues to make progress with activity tolerance and ability to ambulate. Patient will benefit from SNF upon D/C.

## 2019-11-12 NOTE — PROGRESS NOTES
The Sw met with the pt's family and they're in agreement with the d/c plan to [facility] today. The pt's daughter signed the pt choice form and the Sw placed it in the pt's chart.

## 2019-11-12 NOTE — PROGRESS NOTES
[facility]  General Surgery  Progress Note    Subjective:       Post-Op Info:  Procedure(s) (LRB):  Insertion, Pacemaker, Temporary Transvenous (Right)   13 Days Post-Op     Interval History: No acute events overnight. VSS, AF. Hypertensive yesterday afternoon but improved with prns. No abdominal pain; ostomy continues to be functional. Still tolerates regular diet without n/v, but eating very little. States the food is not very palatable to her. Trying to drink boost shakes. Per patient may be looking at possible placement at [facility] today.    Medications:  Continuous Infusions:    Scheduled Meds:   calcium carbonate  1,000 mg Oral TID    carvedilol  25 mg Oral BID    ceFAZolin (ANCEF) IVPB  2 g Intravenous Q8H    cephALEXin  500 mg Oral QID    cloNIDine  0.1 mg Oral BID    epoetin moe-ebpx (RETACRIT) injection  20,000 Units Subcutaneous Every Tues, Thurs, Sat    fluconazole  100 mg Oral Daily    heparin (porcine)  5,000 Units Subcutaneous Q8H    iron sucrose  100 mg Intravenous Every Mon, Wed, Fri    levothyroxine  125 mcg Oral Before breakfast    metroNIDAZOLE  500 mg Oral Q8H    miconazole NITRATE 2 %   Topical (Top) BID    OXcarbazepine  300 mg Oral QHS    PHENobarbital  64.8 mg Oral Nightly    pravastatin  20 mg Oral Daily     PRN Meds:acetaminophen, albuterol-ipratropium, heparin, porcine (PF), morphine, ondansetron, promethazine (PHENERGAN) IVPB, ramelteon, sodium chloride 0.9%     Review of patient's allergies indicates:   Allergen Reactions    Adhesive Itching and Blisters    Penicillins Anaphylaxis    Tramadol Hives    Avelox [moxifloxacin] Rash     Facial and arm itching and redness. Pt states throat closes when given.    Amoxil [amoxicillin]     Aspridrox [aspirin, buffered]     Codeine Other (See Comments)     Throat swelling    Keflex [cephalexin]      Tolerated cefepime and cefazolin    Norvasc [amlodipine]     Red dye Hives    Robitussin [guaifenesin]      Sulfa (sulfonamide antibiotics)     Tylenol [acetaminophen]      Has reaction to Tylenol with red dye and unable to take Extra Strength Tylenol/ CAN ONLY TOLERATE REG STRENGTH TYLENOL    Vicks vaporub [camphor-eucalyptus oil-menthol]      Objective:     Vital Signs (Most Recent):  Temp: 98.7 °F (37.1 °C) (11/12/19 0726)  Pulse: 69 (11/12/19 0726)  Resp: 16 (11/12/19 0413)  BP: (!) 162/68 (11/12/19 0726)  SpO2: (!) 94 % (11/12/19 0726) Vital Signs (24h Range):  Temp:  [98.4 °F (36.9 °C)-99.2 °F (37.3 °C)] 98.7 °F (37.1 °C)  Pulse:  [67-79] 69  Resp:  [16-22] 16  SpO2:  [91 %-94 %] 94 %  BP: (135-192)/(60-75) 162/68     Weight: 71.4 kg (157 lb 6.5 oz)  Body mass index is 30.74 kg/m².    Intake/Output - Last 3 Shifts       11/10 0700 - 11/11 0659 11/11 0700 - 11/12 0659 11/12 0700 - 11/13 0659    P.O. 650 60     I.V. (mL/kg) 100 (1.5)      IV Piggyback 50 150     Total Intake(mL/kg) 800 (11.7) 210 (2.9)     Urine (mL/kg/hr) 2350 (1.4) 2100 (1.2)     Stool 200 100     Total Output 2550 2200     Net -1750 -1990            Stool Occurrence 1 x 1 x           Physical Exam   Constitutional: She is oriented to person, place, and time. She appears well-developed. No distress.   HENT:   Head: Normocephalic and atraumatic.   Weak voice.   Neck: Normal range of motion. Neck supple.   Cardiovascular: Normal rate and regular rhythm.   Abdominal: Soft. There is no guarding.   Ostomy with stool/gas in bag.   Musculoskeletal: She exhibits no tenderness.   Neurological: She is alert and oriented to person, place, and time.   Skin: Skin is warm and dry.   Rash under arms.   Psychiatric: She has a normal mood and affect. Her behavior is normal. Judgment and thought content normal.   Nursing note and vitals reviewed.      Significant Labs:  CBC:   Recent Labs   Lab 11/11/19  0356   WBC 5.25   RBC 2.33*   HGB 7.6*   HCT 24.9*      *   MCH 32.6*   MCHC 30.5*     CMP:   Recent Labs   Lab 11/10/19  0546  11/12/19  0643   GLU  105   < > 95   CALCIUM 7.3*   < > 8.1*   ALBUMIN 2.5*  --   --    PROT 5.1*  --   --       < > 136   K 3.4*   < > 3.4*   CO2 28   < > 29      < > 99   BUN 15   < > 11   CREATININE 0.9   < > 0.8   ALKPHOS 77  --   --    ALT <5*  --   --    AST 28  --   --    BILITOT 0.3  --   --     < > = values in this interval not displayed.         Significant Diagnostics:  I have reviewed all pertinent imaging results/findings within the past 24 hours.        Assessment/Plan:     Hoarseness  Right vocal cord paralysis -[facility] ENT - planning CT of neck later after kidneys have a few more days to recover    Abdominal pain  84-year-old female s/p sigmoid colectomy with end colostomy 10/30/19.      Plan:  Continue abx; per ID stop date for IV cefazolin/metronidazole 11/15/19 (per ID ok for both to be switched to po)  Per ID starting po fluconazole for superficial yeast infection under arms (4 day course)  Nephrology signed off on 11/8, gonzales in place for urinary retention. Following up with neph in clinic 2-3 weeks post-dc.  PT/OT  Continue regular diet, boost, suplena   OOBTC/sit on edge of bed, to chair with PT as able. Rec d/c to SNF when medically able.          Selam Hanson MD  General Surgery  [facility]

## 2019-11-12 NOTE — PLAN OF CARE
11/12/19 0943   Post-Acute Status   Post-Acute Authorization Placement  (snf )   Post-Acute Placement Status   (The ss spoke to Ashley at [facility] and they have no beds today or tomorrow. )

## 2019-11-12 NOTE — PROGRESS NOTES
[facility]  Progress Note    Patient Name: Annette Garcia  MRN: [id number]  Patient Class: IP- Inpatient   Admission Date: 10/27/2019  Length of Stay: 15 days  Attending Physician: Ryan Espinosa MD  Primary Care Provider: Jose Valles MD        Subjective:     Principal Problem:Colon necrosis        HPI:  Annette Garcia is an 84 year old white woman with peripheral vascular disease, renovascular hypertension, renal artery stenosis status post left renal artery stent placement on 7/19/17, coronary artery disease with history of myocardial infarction, diffuse large B cell lymphoma, anemia, epilepsy, hypothyroidism, chronic hyponatremia, depression, lumbar and sacroiliac joint osteoarthritis with chronic low back pain and history of lumbar spine surgery in 2013, slow transit constipation, history of appendectomy, history of cholecystectomy history hysterectomy, history of small bowel obstruction status post small bowel resection on 8/23/16. She lives in [place], [place]. Her son has epilepsy. Her daughter has tuberous sclerosis. Her primary care physician is Dr. Jose Torres. Her pain management specialist is Dr. Chirag Bates. Her neurologist is Dr. Gamal Lock.    She presented to [facility] on 10/27/19 with abdominal pain, difficulty urinating, and constipation for one day. She took polyethylene glycol and senna-docusate without relief. She strained during her last attempt at having a bowel movement. She had one episode of vomiting on the day of presentation. She felt weak when walking. In the emergency department, she was found to have acute kidney injury (BUN 27, creatinine 1.6, from 9 and 0.7 on 8/29/19), elevated transaminases ( and , from 16 and 11 on 8/29/19), and lactic acidosis (5.8). Contrast CT showed acute enteritis, sigmoid and rectal constipation, and some peritoneal free fluid in the pericolic  magdy.she was given lactated ringers, cefepime, and metronidazole. She required norepinephrine for septic shock. She was admitted to [facility].    Overview/Hospital Course:  Blood cultures grew pan-sensitive Escherichia coli. Due to history of bowel resection for obstruction, General Surgery was consulted for her bowel obstruction. She was put on amikacin and cefepime. 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 She was kept intubated postoperatively. Hospital Medicine changed amikacin to metronidazole, then later stopped metronidazole and changed cefepime to meropenem. She was given total parenteral nutrition. She became oliguric but responded well to a dose of furosemide, so Nephrology gave her doses of bumetanide. She developed thrombocytopenia. She developed atrial fibrillation and sinus pauses, so Cardiology was consulted and placed a temporary transvenous pacemaker. Echocardiogram only showed concentric remodeling and mild mitral regurgitation. Norepinephrine was changed to dopamine. EEG showed triphasic waves bilaterally consistent with metabolic encephalopathy. The epileptologist recommended treating her hyponatremia. She required pRBC transfusion. Nephrology improved her hyponatremia, and encephalopathy resolved. She was weaned off vasopressors and was extubated the morning of 11/2/19. Surgery stopped TPN and started full liquid diet on 11/3/19. Ertapenem was changed to cefazolin but Infectious Disease restarted metronidazole to continue empiric anaerobe coverage. Cardiology removed her temporary pacemaker on 11/4/19.     Interval History: Continuing to work well with therapy. Prefers chocolate over vanilla. Says that she gets full quickly.     Review of Systems   Constitutional: Negative for chills and fever.   Respiratory: Negative for cough and shortness of breath.    Cardiovascular: Negative for chest pain  and palpitations.   Gastrointestinal: Negative for nausea and vomiting.     Objective:     Vital Signs (24h Range):  Temp:  [97.7 °F (36.5 °C)-99.2 °F (37.3 °C)] 98.7 °F (37.1 °C)  Pulse:  [67-79] 68  Resp:  [16-22] 16  SpO2:  [91 %-94 %] 94 %  BP: (135-192)/(60-75) 156/66     Weight: 71.4 kg (157 lb 6.5 oz)  Body mass index is 30.74 kg/m².    Intake/Output Summary (Last 24 hours) at 11/12/2019 0533  Last data filed at 11/11/2019 2330  Gross per 24 hour   Intake 210 ml   Output 1050 ml   Net -840 ml      Physical Exam   Constitutional: She is oriented to person, place, and time. She appears well-developed and well-nourished. No distress.   Cardiovascular: Normal rate and regular rhythm.   No murmur heard.  Pulmonary/Chest: Effort normal and breath sounds normal. No respiratory distress. She has no wheezes.   Abdominal: Soft. Bowel sounds are normal. She exhibits no distension. There is tenderness.   Musculoskeletal: She exhibits edema.   Neurological: She is alert and oriented to person, place, and time.   Skin: Ecchymosis noted.   Psychiatric: She has a normal mood and affect. Her behavior is normal.   Nursing note and vitals reviewed.      Significant Labs:   CBC:   Recent Labs   Lab 11/11/19 0356   WBC 5.25   HGB 7.6*   HCT 24.9*        CMP:   Recent Labs   Lab 11/10/19  0546 11/11/19  0356    139   K 3.4* 3.7    103   CO2 28 29    117*   BUN 15 11   CREATININE 0.9 0.8   CALCIUM 7.3* 7.3*   PROT 5.1*  --    ALBUMIN 2.5*  --    BILITOT 0.3  --    ALKPHOS 77  --    AST 28  --    ALT <5*  --    ANIONGAP 6* 7*   EGFRNONAA 60 >60     Magnesium:   Recent Labs   Lab 11/10/19  0546 11/11/19  0356   MG 1.2* 1.8       Significant Imaging: I have reviewed all pertinent imaging results/findings within the past 24 hours.      Assessment/Plan:      * Colon necrosis  E. coli bacteremia  Acute renal failure due to Acute tubular necrosis  DIC (disseminated intravascular coagulation)  Giving cefazolin  and metronidazole per ID until 11/15/16. Status post sigmoidectomy. Creatinine decreased to 0.9. Continues to have renal recovery. Removed trialysis catheter 11/7/19. Appreciate General Surgery, Pulmonology, Cardiology, Nephrology, Hematology-Oncology, and Infectious Disease.     Hypophosphatemia  Give neutraphos.       Hypomagnesemia  Give mag sulfate IV.       Hypokalemia  Give KCl po.       Hoarseness  Right vocal cord paralysis  Appreciate ENT evaluation.  SLP following.    ENT requests CT neck and chest with IV contrast, but will hold off on that for right now as kidneys continue to recover.        Acute urinary retention  Martinez in place.   Follow up with Urology as outpatient.       Anemia of acute infection  Transfused pRBCs.    Slow transit constipation  Takes polyethylene glycol at home.    Peripheral vascular disease, unspecified  Hyperlipidemia   Takes pravastatin.    Old MI (myocardial infarction)  Takes pravastatin.    Depression  Continue home mirtazapine 7.5mg HS.    Anemia due to antineoplastic chemotherapy  Thrombocytopenia - Resolved  Stable.  Continue to monitor.     DLBCL (diffuse large B cell lymphoma)  Follow up outpatient.    Acquired hypothyroidism  Continue home levothyroxine 125 mcg before breakfast.  Lab Results   Component Value Date    TSH 0.507 11/01/2019       Epilepsy  Continue home oxycarbazepine 300 mg nightly, phenobarbital 64.8 mg nightly.    Renovascular hypertension  Holding home lisinopril, clonidine, carvedilol.  SBP ranged 124 to 188  Continue to monitor.  Increase carvedilol to 25 mg BID and start clonidine 0.1 mg BID (1/2 home dose) this evening.       VTE Risk Mitigation (From admission, onward)         Ordered     heparin (porcine) 1,000 unit/mL injection      11/04/19 1534     heparin (porcine) injection 5,000 Units  Every 8 hours      11/02/19 1447     heparin, porcine (PF) 100 unit/mL injection flush 1,000 Units  As needed (PRN)      11/02/19 2049     IP VTE LOW RISK  PATIENT  Once      10/30/19 0811     Place sequential compression device  Until discontinued      10/28/19 0607                      Ryan Espinosa MD  Department of Hospital Medicine   [facility]

## 2019-11-13 ENCOUNTER — PATIENT OUTREACH (OUTPATIENT)
Dept: HOME HEALTH SERVICES | Facility: HOSPITAL | Age: 84
End: 2019-11-13

## 2019-11-13 NOTE — PHYSICIAN QUERY
PT Name: Annette Garcia  MR #: [id number]    Physician Query Form -Integumentary  Clarification     CDS: Little DHALIWAL,RN        Contact information:[phone]  This form is a permanent document in the medical record.     Query Date: November 13, 2019    By submitting this query, we are merely seeking further clarification of documentation. Please utilize your independent clinical judgment when addressing the question(s) below.    The Medical record contains the following:   Indicator  Supporting Clinical Findings Location in Medical Record   x Redness Red (%), Wound Tissue Color  100 % 11/7/19 Wound Care Progress Notes   x Decubitus, Pressure Ulcer, etc.  Coccyx Stage 1  11/7/19 Wound Care Progress Notes    Deep Tissue Injury     x Wound Care Consult  Pressure Injury 11/07/19 1300 Coccyx Stage 1      Date First Assessed/Time First Assessed: 11/07/19 1300   Pressure Injury Present on Admission: suspected hospital acquired Location: Coccyx Staging: Stage 1       Staging Stage 1                                                          Dressing Appearance Dry; Intact; Clean                   Drainage Amount None                                           Drainage Characteristics/Odor  No odor                Appearance Red                                                  Tissue loss description Not applicable                            Red (%), Wound Tissue Color 100 %                     Periwound Are  Intact                                                   Wound Edges Defined                                            Wound Length (cm) 2 cm                                     Wound Width (cm) 2 cm                                     Wound Depth (cm) 0 cm                                     Wound Volume (cm^3) 0 cm^3                                Wound Surface Area (cm^2)  4 cm^2                               11/7/19 Wound Care Progress Notes    Medication     x Treatment Care Cleansed with:; Sterile  normal saline  Dressing Foam   11/7/19 Wound Care Progress Notes    Other       National Pressure Ulcer Advisory Panel (2007) Pressure Ulcer Definitions & Stages:    Stage I:  Intact skin with non-blanchable redness of a localized area usually over a bony prominence.   Stage II:  Partial thickness loss of dermis presenting as a shallow open ulcer with a red pink wound bed, without slough.   Stage III: Full thickness tissue loss. Subcutaneous fat may be visible but bone, tendon or muscle is not exposed. Slough may be present but does not obscure the depth of tissue loss. May include undermining and tunneling.   Stage IV: Full thickness tissue loss with exposed bone, tendon or muscle. Slough or eschar may be present on some parts of the wound bed. Often include undermining and tunneling.   Unstageable:   Full thickness tissue loss in which the base of the ulcer is covered by slough and/or eschar in the wound bed. Until enough slough and/or eschar is removed to expose the base of the wound, the true depth, and therefore stage, cannot be determined.   Deep Tissue Injury: Purple or maroon localized area of discolored intact skin or blood-filled blister due to damage of underlying soft tissue from  pressure and/or shear.     Provider, please specify the diagnosis or diagnoses associated with above clinical findings.  [  x] Decubitus (Pressure) Ulcer / Deep Tissue Injury   (please specify site, laterality, stage, and POA status)    SITE LATERALITY STAGE PRESENT ON ADMISSION?    [x  ]  coccyx [  ]  right       [  ]  left 1 [ x ] yes  [  ] no  [  ] unknown    [ [   ] ] clinically undetermined   [  ] Other Integumentary Diagnosis (please specify): _________      [ [   ] ] Clinically Undetermined                Please document in your progress notes daily for the duration of treatment until resolved and include in your discharge summary.

## 2019-11-13 NOTE — PLAN OF CARE
Problem: Physical Therapy Goal  Goal: Physical Therapy Goal  Description  Goals to be met by:      Patient will increase functional independence with mobility by performin. Supine to sit with Moderate Assistance-Met 19  REVISED: CG/Lina  2. Sit to supine with Moderate Assistance  3. Rolling to Left and Right with Moderate Assistance.-met 19  REVISED: CGA  4. Sit to stand transfer with Moderate Assistance using RW  5. Bed to chair transfer with Minimal Assistance and Moderate Assistance using Rolling Walker-met 19  REVISED: CG/Lina  6. Gait  x 25 feet with Minimal Assistance and Moderate Assistance using Rolling Walker.   7. Lower extremity exercise program x10 with active BLE with assistance as needed      Outcome: Ongoing, Progressing   Pt progressing toward goals; being transferred to SNF today.

## 2019-11-13 NOTE — PROGRESS NOTES
Spoke to  at [facility]. Patient arrived with all necessary items except Rx for phenobarb. Facility obtained rx from their MD.

## 2019-11-13 NOTE — PT/OT/SLP PROGRESS
Physical Therapy Treatment    Patient Name:  Annette Garcia   MRN:  [id number]    Recommendations:     Discharge Recommendations:  nursing facility, skilled   Discharge Equipment Recommendations: (defer to SNF)   Barriers to discharge: Decreased caregiver support; increased level of assistance    Assessment:     Annette Garcia is a 84 y.o. female admitted with a medical diagnosis of Colon necrosis.  She presents with the following impairments/functional limitations:  weakness, impaired endurance, gait instability, impaired functional mobilty, impaired self care skills, impaired balance, decreased upper extremity function, decreased lower extremity function, decreased safety awareness, decreased ROM, impaired skin Pt cont to progress toward goals.    Rehab Prognosis: Fair+; patient would benefit from acute skilled PT services to address these deficits and reach maximum level of function.    Recent Surgery: Procedure(s) (LRB):  Insertion, Pacemaker, Temporary Transvenous (Right) 13 Days Post-Op    Plan:     During this hospitalization, patient to be seen 6 x/week to address the identified rehab impairments via gait training, therapeutic activities, therapeutic exercises, neuromuscular re-education and progress toward the following goals:    · Plan of Care Expires:  12/01/19    Subjective     Pain/Comfort:  · Pain Rating 1: 0/10  · Pain Addressed 1: Reposition, Cessation of Activity  · Pain Rating Post-Intervention 1: 0/10      Objective:     Communicated with nurse prior to session.  Patient found with bed alarm, central line, telemetry, gonzales catheter, colostomy upon PT entry to room.     General Precautions: Standard, aspiration, fall, vision impaired   Orthopedic Precautions:N/A   Braces: N/A     Functional Mobility:  · Bed Mobility:     · Scooting: contact guard and minimum assistance with increased time/effort to edge of chair  · Sit to Supine: moderate assistance  · Transfers:     · Sit to Stand:  minimum  assistance and of 2 persons with rolling walker  · Gait: Patient amb 8ft using RW with min/modA x 2 persons; forward flexed trunk, head down, short step length, slow alexa      AM-PAC 6 CLICK MOBILITY  Turning over in bed (including adjusting bedclothes, sheets and blankets)?: 3  Sitting down on and standing up from a chair with arms (e.g., wheelchair, bedside commode, etc.): 2  Moving from lying on back to sitting on the side of the bed?: 2  Moving to and from a bed to a chair (including a wheelchair)?: 2  Need to walk in hospital room?: 2  Climbing 3-5 steps with a railing?: 1  Basic Mobility Total Score: 12       Therapeutic Activities and Exercises:   AM session: pt sitting in bedside chair; pt scooted to edge of chair with CG/Lina and increased time/effort; sit to stand with Lina x 2 -performed x 2 trials then amb using RW with min/modA as described above; pt sat in chair and performed BLE AROM ex x 10 reps: APs, FAQ with 3-5 sec hold, hip/knee fl, hip abd/add, trunk extension, forward fl to neutral; pt left sitting in chair.  PM session: pt scooted to edge of chair with CG/Lina; sit to stand at RW with Lina x 2 and amb 3ft to bed with min/modA x 2; sit to supine with mod A and shifted to middle of bed with Lina; maxA x 2 to HOB with drawsheet; heels floated off bed; informed family of pt's status and how course of rehab should follow, ideas to keep pt encouraged and participating.    Patient left HOB elevated with all lines intact, call button in reach, bed alarm on and nurse notified..    GOALS:   Multidisciplinary Problems     Physical Therapy Goals        Problem: Physical Therapy Goal    Goal Priority Disciplines Outcome Goal Variances Interventions   Physical Therapy Goal     PT, PT/OT Ongoing, Progressing     Description:  1.  Roll bilaterally with supervision  2.  Supine to/from sit with supervision  3.  Bed to/from chair with CG  4.  Ambulate 50' with RW with CG  5.  Sit in chair 2 hours            Problem: Physical Therapy Goal    Goal Priority Disciplines Outcome Goal Variances Interventions   Physical Therapy Goal     PT, PT/OT Ongoing, Progressing     Description:  Goals to be met by:      Patient will increase functional independence with mobility by performin. Supine to sit with Moderate Assistance-Met 19  REVISED: CG/Lina  2. Sit to supine with Moderate Assistance  3. Rolling to Left and Right with Moderate Assistance.-met 19  REVISED: CGA  4. Sit to stand transfer with Moderate Assistance using RW  5. Bed to chair transfer with Minimal Assistance and Moderate Assistance using Rolling Walker-met 19  REVISED: CG/Lina  6. Gait  x 25 feet with Minimal Assistance and Moderate Assistance using Rolling Walker.   7. Lower extremity exercise program x10 with active BLE with assistance as needed                       Time Tracking:     PT Received On: 19  PT Start Time: 1317(1125)     PT Stop Time: 1341(1153) overlap with OT in AM  PT Total Time (min): 24 min in PM; 32 minutes in AM    Billable Minutes: Gait Training 8 minutes Therapeutic Exercise 15 minutes in AM; Therapeutic Activity 24 minutes in PM    Treatment Type: Treatment  PT/PTA: PT     PTA Visit Number: 0     Kinga Velásquez, PT  2019

## 2019-11-19 ENCOUNTER — DOCUMENTATION ONLY (OUTPATIENT)
Dept: HEPATOLOGY | Facility: HOSPITAL | Age: 84
End: 2019-11-19

## 2019-11-20 ENCOUNTER — PATIENT OUTREACH (OUTPATIENT)
Dept: HOME HEALTH SERVICES | Facility: HOSPITAL | Age: 84
End: 2019-11-20

## 2019-11-20 ENCOUNTER — HOSPITAL ENCOUNTER (OUTPATIENT)
Facility: HOSPITAL | Age: 84
Discharge: NURSING, REHAB OR LONG-TERM CARE | End: 2019-11-22
Attending: EMERGENCY MEDICINE | Admitting: FAMILY MEDICINE
Payer: MEDICARE

## 2019-11-20 DIAGNOSIS — R07.9 CHEST PAIN: ICD-10-CM

## 2019-11-20 DIAGNOSIS — Z71.89 COUNSELING REGARDING ADVANCE CARE PLANNING AND GOALS OF CARE: ICD-10-CM

## 2019-11-20 DIAGNOSIS — I10 ESSENTIAL HYPERTENSION: ICD-10-CM

## 2019-11-20 DIAGNOSIS — Z51.5 PALLIATIVE CARE ENCOUNTER: ICD-10-CM

## 2019-11-20 DIAGNOSIS — Z71.89 GOALS OF CARE, COUNSELING/DISCUSSION: ICD-10-CM

## 2019-11-20 DIAGNOSIS — R40.20 LOSS OF CONSCIOUSNESS: Primary | ICD-10-CM

## 2019-11-20 DIAGNOSIS — R55 SYNCOPE, UNSPECIFIED SYNCOPE TYPE: ICD-10-CM

## 2019-11-20 DIAGNOSIS — R55 SYNCOPE: ICD-10-CM

## 2019-11-20 DIAGNOSIS — I95.9 HYPOTENSION, UNSPECIFIED HYPOTENSION TYPE: ICD-10-CM

## 2019-11-20 LAB
ALBUMIN SERPL BCP-MCNC: 2.8 G/DL (ref 3.5–5.2)
ALP SERPL-CCNC: 65 U/L (ref 55–135)
ALT SERPL W/O P-5'-P-CCNC: 12 U/L (ref 10–44)
ANION GAP SERPL CALC-SCNC: 11 MMOL/L (ref 8–16)
AST SERPL-CCNC: 42 U/L (ref 10–40)
BACTERIA #/AREA URNS HPF: ABNORMAL /HPF
BASOPHILS # BLD AUTO: 0.01 K/UL (ref 0–0.2)
BASOPHILS NFR BLD: 0.2 % (ref 0–1.9)
BILIRUB SERPL-MCNC: 0.4 MG/DL (ref 0.1–1)
BILIRUB UR QL STRIP: NEGATIVE
BNP SERPL-MCNC: 240 PG/ML (ref 0–99)
BUN SERPL-MCNC: 10 MG/DL (ref 8–23)
CALCIUM SERPL-MCNC: 8.5 MG/DL (ref 8.7–10.5)
CHLORIDE SERPL-SCNC: 96 MMOL/L (ref 95–110)
CK SERPL-CCNC: 36 U/L (ref 20–180)
CLARITY UR: CLEAR
CO2 SERPL-SCNC: 30 MMOL/L (ref 23–29)
COLOR UR: YELLOW
CREAT SERPL-MCNC: 0.7 MG/DL (ref 0.5–1.4)
DIFFERENTIAL METHOD: ABNORMAL
EOSINOPHIL # BLD AUTO: 0.2 K/UL (ref 0–0.5)
EOSINOPHIL NFR BLD: 3.9 % (ref 0–8)
ERYTHROCYTE [DISTWIDTH] IN BLOOD BY AUTOMATED COUNT: 19.3 % (ref 11.5–14.5)
EST. GFR  (AFRICAN AMERICAN): >60 ML/MIN/1.73 M^2
EST. GFR  (NON AFRICAN AMERICAN): >60 ML/MIN/1.73 M^2
GLUCOSE SERPL-MCNC: 107 MG/DL (ref 70–110)
GLUCOSE UR QL STRIP: NEGATIVE
HCT VFR BLD AUTO: 29.6 % (ref 37–48.5)
HGB BLD-MCNC: 9.2 G/DL (ref 12–16)
HGB UR QL STRIP: NEGATIVE
KETONES UR QL STRIP: NEGATIVE
LEUKOCYTE ESTERASE UR QL STRIP: ABNORMAL
LYMPHOCYTES # BLD AUTO: 1.3 K/UL (ref 1–4.8)
LYMPHOCYTES NFR BLD: 24.2 % (ref 18–48)
MCH RBC QN AUTO: 34.1 PG (ref 27–31)
MCHC RBC AUTO-ENTMCNC: 31.1 G/DL (ref 32–36)
MCV RBC AUTO: 110 FL (ref 82–98)
MICROSCOPIC COMMENT: ABNORMAL
MONOCYTES # BLD AUTO: 0.7 K/UL (ref 0.3–1)
MONOCYTES NFR BLD: 13.6 % (ref 4–15)
NEUTROPHILS # BLD AUTO: 3.1 K/UL (ref 1.8–7.7)
NEUTROPHILS NFR BLD: 58.1 % (ref 38–73)
NITRITE UR QL STRIP: NEGATIVE
PH UR STRIP: 7 [PH] (ref 5–8)
PLATELET # BLD AUTO: 238 K/UL (ref 150–350)
PMV BLD AUTO: 9.8 FL (ref 9.2–12.9)
POTASSIUM SERPL-SCNC: 3.4 MMOL/L (ref 3.5–5.1)
PROT SERPL-MCNC: 5.4 G/DL (ref 6–8.4)
PROT UR QL STRIP: ABNORMAL
RBC # BLD AUTO: 2.7 M/UL (ref 4–5.4)
RBC #/AREA URNS HPF: 0 /HPF (ref 0–4)
SODIUM SERPL-SCNC: 137 MMOL/L (ref 136–145)
SP GR UR STRIP: 1.01 (ref 1–1.03)
SQUAMOUS #/AREA URNS HPF: 1 /HPF
TROPONIN I SERPL DL<=0.01 NG/ML-MCNC: 0.02 NG/ML (ref 0–0.03)
TROPONIN I SERPL DL<=0.01 NG/ML-MCNC: 0.02 NG/ML (ref 0–0.03)
URN SPEC COLLECT METH UR: ABNORMAL
UROBILINOGEN UR STRIP-ACNC: NEGATIVE EU/DL
WBC # BLD AUTO: 5.45 K/UL (ref 3.9–12.7)
WBC #/AREA URNS HPF: 6 /HPF (ref 0–5)

## 2019-11-20 PROCEDURE — 80053 COMPREHEN METABOLIC PANEL: CPT

## 2019-11-20 PROCEDURE — 82550 ASSAY OF CK (CPK): CPT

## 2019-11-20 PROCEDURE — 87040 BLOOD CULTURE FOR BACTERIA: CPT

## 2019-11-20 PROCEDURE — 84484 ASSAY OF TROPONIN QUANT: CPT | Mod: 91

## 2019-11-20 PROCEDURE — 25000003 PHARM REV CODE 250: Performed by: FAMILY MEDICINE

## 2019-11-20 PROCEDURE — G0378 HOSPITAL OBSERVATION PER HR: HCPCS

## 2019-11-20 PROCEDURE — 51702 INSERT TEMP BLADDER CATH: CPT

## 2019-11-20 PROCEDURE — 84484 ASSAY OF TROPONIN QUANT: CPT

## 2019-11-20 PROCEDURE — 36415 COLL VENOUS BLD VENIPUNCTURE: CPT

## 2019-11-20 PROCEDURE — 83880 ASSAY OF NATRIURETIC PEPTIDE: CPT

## 2019-11-20 PROCEDURE — 81000 URINALYSIS NONAUTO W/SCOPE: CPT

## 2019-11-20 PROCEDURE — 99285 EMERGENCY DEPT VISIT HI MDM: CPT | Mod: 25

## 2019-11-20 PROCEDURE — 25000003 PHARM REV CODE 250: Performed by: NURSE PRACTITIONER

## 2019-11-20 PROCEDURE — 85025 COMPLETE CBC W/AUTO DIFF WBC: CPT

## 2019-11-20 RX ORDER — PRAVASTATIN SODIUM 10 MG/1
20 TABLET ORAL DAILY
Status: DISCONTINUED | OUTPATIENT
Start: 2019-11-21 | End: 2019-11-22 | Stop reason: HOSPADM

## 2019-11-20 RX ORDER — PHENOBARBITAL 32.4 MG/1
64.8 TABLET ORAL NIGHTLY
Status: DISCONTINUED | OUTPATIENT
Start: 2019-11-20 | End: 2019-11-22 | Stop reason: HOSPADM

## 2019-11-20 RX ORDER — POLYETHYLENE GLYCOL 3350 17 G/17G
17 POWDER, FOR SOLUTION ORAL 2 TIMES DAILY PRN
Status: DISCONTINUED | OUTPATIENT
Start: 2019-11-20 | End: 2019-11-22 | Stop reason: HOSPADM

## 2019-11-20 RX ORDER — IPRATROPIUM BROMIDE AND ALBUTEROL SULFATE 2.5; .5 MG/3ML; MG/3ML
3 SOLUTION RESPIRATORY (INHALATION) EVERY 4 HOURS PRN
Status: DISCONTINUED | OUTPATIENT
Start: 2019-11-20 | End: 2019-11-22 | Stop reason: HOSPADM

## 2019-11-20 RX ORDER — LANOLIN ALCOHOL/MO/W.PET/CERES
200 CREAM (GRAM) TOPICAL DAILY
Status: DISCONTINUED | OUTPATIENT
Start: 2019-11-21 | End: 2019-11-20

## 2019-11-20 RX ORDER — ACETAMINOPHEN 325 MG/1
650 TABLET ORAL EVERY 6 HOURS PRN
Status: DISCONTINUED | OUTPATIENT
Start: 2019-11-21 | End: 2019-11-22 | Stop reason: HOSPADM

## 2019-11-20 RX ORDER — CARVEDILOL 25 MG/1
25 TABLET ORAL 2 TIMES DAILY WITH MEALS
Status: DISCONTINUED | OUTPATIENT
Start: 2019-11-20 | End: 2019-11-21

## 2019-11-20 RX ORDER — SODIUM CHLORIDE 0.9 % (FLUSH) 0.9 %
10 SYRINGE (ML) INJECTION
Status: DISCONTINUED | OUTPATIENT
Start: 2019-11-20 | End: 2019-11-22 | Stop reason: HOSPADM

## 2019-11-20 RX ORDER — LISINOPRIL 20 MG/1
40 TABLET ORAL DAILY
Status: DISCONTINUED | OUTPATIENT
Start: 2019-11-21 | End: 2019-11-21

## 2019-11-20 RX ORDER — OXCARBAZEPINE 150 MG/1
300 TABLET, FILM COATED ORAL NIGHTLY
Status: DISCONTINUED | OUTPATIENT
Start: 2019-11-20 | End: 2019-11-22 | Stop reason: HOSPADM

## 2019-11-20 RX ORDER — CLONIDINE HYDROCHLORIDE 0.2 MG/1
0.2 TABLET ORAL 2 TIMES DAILY
Status: DISCONTINUED | OUTPATIENT
Start: 2019-11-21 | End: 2019-11-22 | Stop reason: HOSPADM

## 2019-11-20 RX ORDER — CHOLECALCIFEROL (VITAMIN D3) 25 MCG
1000 TABLET ORAL DAILY
Status: DISCONTINUED | OUTPATIENT
Start: 2019-11-21 | End: 2019-11-22 | Stop reason: HOSPADM

## 2019-11-20 RX ORDER — ONDANSETRON 2 MG/ML
4 INJECTION INTRAMUSCULAR; INTRAVENOUS EVERY 8 HOURS PRN
Status: DISCONTINUED | OUTPATIENT
Start: 2019-11-20 | End: 2019-11-22 | Stop reason: HOSPADM

## 2019-11-20 RX ADMIN — PHENOBARBITAL 64.8 MG: 32.4 TABLET ORAL at 08:11

## 2019-11-20 RX ADMIN — OXCARBAZEPINE 300 MG: 150 TABLET, FILM COATED ORAL at 08:11

## 2019-11-20 RX ADMIN — CARVEDILOL 25 MG: 25 TABLET, FILM COATED ORAL at 08:11

## 2019-11-20 NOTE — HPI
Ms. Annette Garcia is an 85 yo female with a past medical history of peripheral vascular disease, renovascular hypertension, renal artery stenosis status post left renal artery stent placement on 7/19/17, coronary artery disease with history of myocardial infarction, diffuse large B cell lymphoma, anemia, epilepsy, hypothyroidism, chronic hyponatremia, depression, lumbar and sacroiliac joint osteoarthritis with chronic low back pain and history of lumbar spine surgery in 2013, slow transit constipation, with hx of small bowel resection on 8/23/16. Her primary care physician is Dr. Jose Torres.     Recently admitted 10/27-11/12/19 for management of E coli bacteremia. She developed atrial fibrillation and sinus pauses during hospital course. Cardiology was consulted and placed a temporary transvenous pacemaker, which was removed on 11/4.     Patient presented to ED 11/20/19 for brief syncopal episode at [facility] after physical therapy. The staff report that she was unresponsive for a few minutes. There was no seizure like activity. No bowel/bladder incontinence. Patient unable to recall event. SBP reportedly 70s. She denies visual disturbance, confusion, fever, chills, sore throat, SOB, CP, abdominal pain, nausea, vomiting, difficulty urinating, changes in urinary frequency or urgency, back pain, HA, rash, any open wounds, or weakness. Per daughter patient has had similar events in the past.      Labs unremarkable. CT brain negative. CXR  Negative. VSS. Neuro intact. No acute findings on EKG. Patient admitted to [facility].

## 2019-11-20 NOTE — ED NOTES
AAOX4. Low voice related to recent intubation. Skin is warm and dry with very red perineum and stage one to coccyx. Respirations are even and unlabored. Heart tones s1s2. abd soft with tenderness to llq. luq colostomy intact without signs of complciations. Mid abdomen 14 staples intact without signs of complications. Right chest wall portacath intact flushes easily and blood return. No s/s of complciations. Martinez catheter intact draining clear yellow urine. Mild edema ble. Positive bilateral pedal pulses. Side rails up x2. familyx2 at bedside. Call bell is in reach.

## 2019-11-20 NOTE — PROGRESS NOTES
[facility]                                 Skilled Nursing Facility                   Progress Note     Admit Date:  11/12/2019  JESSICA   Principal Problem:  S/P sepsis/JAS sigmoid colon resection  HPI obtained from patient interview and chart review     Visit Date: 11/19/2019    Chief Complaint: Establish Care/ Initial Visit S/P hospitalization for sepsis/JAS sigmoid colon resection    HPI:   Ms. Garcia is an 84-year-old female with a past medical history of peripheral vascular disease, renovascular hypertension, renal artery stenosis status post left renal artery stent placement on 7/19/17, coronary artery disease with history of myocardial infarction, diffuse large B cell lymphoma, anemia, epilepsy, hypothyroidism, chronic hyponatremia, depression, lumbar and sacroiliac joint osteoarthritis with chronic low back pain and history of lumbar spine surgery in 2013, slow transit constipation, with hx of small bowel resection on 8/23/16. She presented to [facility] with c/o abdominal pain, difficulty urinating, and constipation for one day. In the emergency department, she was found to have acute kidney injury (BUN 27, creatinine 1.6, from 9 and 0.7 on 8/29/19), elevated transaminases ( and , from 16 and 11 on 8/29/19), and lactic acidosis (5.8). Contrast CT showed acute enteritis, sigmoid and rectal constipation, and some peritoneal free fluid in the pericolic gutters. She was tx with IVF, and multiple antibiotics. She ultimately suffered from septic shock, was intubated, had a dialysis catheter placed due to oliguria, had a transvenous pacer placed for AF with sinus pauses, and was followed do hyponatremia in light of epilepsy hx. She also developed metabolic encephalopathy post op. She is s/p sigmoid colectomy with end colostomy after finding sigmoid necrosis on 10/29. A Martinez catheter was placed on 11/5/19 for urinary retention and  she will follow up with Urology. The gonzales was d/anabella prior to admit to [facility], but had to be reinserted on 11/15 due to continued retention. She was found to have right vocal cord paralysis as well and ENT was consulted, who recommended at CT scan to f/u once JAS resolved. Infectious Disease recommended finishing cephalexin, metronidazole, and fluconazole on 11/15/19. SNF recommended for further care after d/c.    Patient will be treated at [facility] SNF with PT and OT to improve functional status and ability to perform ADLs.     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    Past Surgical History: Patient has a past surgical history that includes Cholecystectomy; Appendectomy; Tonsillectomy; Vaginal hysterectomy w/ anterior and posterior vaginal repair; Cataract extraction, bilateral (Bilateral); Hysterectomy; Eye surgery (Bilateral); Portacath placement (Right, 09/2016); Back surgery (1988); Back surgery (02/2013); Renal artery stent (Left, 07/19/2017); Small intestine surgery (08/23/2016); and Sigmoidectomy (10/29/2019).    Social History: Patient reports that she has never smoked. She has never used smokeless tobacco. She reports that she does not drink alcohol or use drugs.    Family History: family history includes Heart attack in her father; Hypertension in her father; Pancreatic cancer in her mother.    Allergies: Patient is allergic to adhesive; penicillins; tramadol; avelox [moxifloxacin]; amoxil [amoxicillin]; aspridrox [aspirin, buffered]; codeine; keflex [cephalexin]; norvasc [amlodipine]; red dye; robitussin [guaifenesin]; sulfa (sulfonamide antibiotics); tylenol [acetaminophen]; and vicks vaporub [camphor-eucalyptus oil-menthol].    ROS  Constitutional: Negative for fever or fatigue, + hoarse voice   Eyes:  Negative for blurred vision, double vision and discharge.   Respiratory: Negative for cough, shortness of breath and wheezing.    Cardiovascular: Negative for chest pain, palpitations, and leg swelling.   Gastrointestinal: Negative for abdominal pain, constipation, diarrhea, nausea and vomiting.   Genitourinary: Negative for dysuria, frequency and urgency.   Musculoskeletal:  + generalized weakness. Negative for back pain and myalgias.   Skin: Negative for itching and rash.   Neurological: Negative for dizziness, speech change, and headaches.   Psychiatric/Behavioral: Negative for depression. The patient is not nervous/anxious.      PEx     Constitutional: Patient appears well-developed and in no distress   Head: Normocephalic and atraumatic.   Eyes: Pupils are equal, round, and reactive to light.   Neck: Normal range of motion. Neck supple.   Cardiovascular: Normal rate, regular rhythm and normal heart sounds.    Pulmonary/Chest: Effort normal and breath sounds are clear  Abdominal: Soft. Bowel sounds are normal.   Musculoskeletal: Normal range of motion.   Neurological: Alert and oriented to person, place, and time.   Psychiatric: Normal mood and affect. Behavior is normal, + hoarse voice.   Skin: Skin is warm and dry. Full skin assessment completed during visit, + stage II pressure ulcer to coccyx, R U chest port a cath, Abdominal surgical incision present      Assessment and Plan:    Colon necrosis, improved   S/P sigmoidectomy 10/29/19  E. coli bacteremia, resolved  -Treated with Keflexand metronidazole per ID until 11/15/16    Debility r/t recent sigmoidectomy/ARF  - Continue with PT/OT for gait training and strengthening and restoration of ADL's   - Encourage mobility, OOB in chair, and early ambulation as appropriate  - Fall precautions   - Monitor for bowel and bladder dysfunction  - Monitor for and prevent skin breakdown and pressure ulcers  - Pain control: Tylenol PRN    Vaginal Yeast infection,  ongoing  -Treated with 4 day course of low dose Diflucan during admit> infection persists.   -Initiate Diflucan 150 mg PO x 1 dose today    Hypophosphatemia, improved  Hypomagnesemia, improved  Hypokalemia, improved  -continue neutraphos, KCl replacement, Mg replacement    Right vocal cord paralysis with hoarse voice, ongoing  -ENT consulted to during admit, recommended a CT neck and chest with IV contrast once kidney function improved  -ST following    -Initiate Chloraseptic spray PRN sore throat     Acute urinary retention, ongoing  -Follow up with Urology as outpatient.   -Martinez re inserted at facility on 11/15 for retention    Multiple Wounds, ongoing  1. Stage II coccyx PU  2. Abdominal surgical incision  3.R chest wall port accessed  -Awaiting measurements from WC RN, will follow weekly    DLBCL (diffuse large B cell lymphoma)  Anemia due to antineoplastic chemotherapy  Thrombocytopenia, stable  -Transfused pRBCs during admit  -Patient has RU chest wall port that is accessed upon admit to facility  -Follow up with hem onc as outpatient.    Slow transit constipation, ongoing  -Continue polyethylene glycol BID (home med)     Peripheral vascular disease, unspecified  Remote Hx of MI   HTN with Hyperlipidemia, stable  -Continue Lisinopril, Coreg, Clonidine, pravastatin    Epilepsy, chronic, stable  MDD with recurrent episodes, ongoing  -Continue home mirtazapine 7.5mg HS.  -Continue phenobarb, Trileptal     Acquired hypothyroidism, stable  -Continue home levothyroxine 125 mcg before breakfast.    Vitamin D Deficiency, stable  -Continue Vitamin D 3 replacement + Os sukhdev 1500 mg QD      Acute renal failure due to Acute tubular necrosis, resolved  DIC (disseminated intravascular coagulation), resolved    Outpatient MDs: Her primary care physician is Dr. Jose Torres. Her pain management specialist is Dr. Chirag Bates. Her neurologist is Dr. Gamal Lock.       Future Appointments   Date Time Provider  Department Center   11/27/2019  3:20 PM Chris Johnson MD [facility] GENSUR [facility]   12/6/2019  1:00 PM Carleen Estrada MD [facility] UROLOGY [facility]   12/12/2019 10:00 AM Selam Mills MD [facility] MAREN [facility]   12/17/2019  9:40 AM Jose Valles MD [facility] MED [facility]   4/2/2020 11:00 AM CHAIR 07 [facility] CHEMO [facility]         I certify that SNF services are required to be given on an inpatient basis because Annette Garcia needs for skilled nursing care and/or skilled rehabilitation are required on a daily basis and such services can only practically be provided in a skilled nursing facility setting and are for an ongoing condition for which she received inpatient care in the hospital.     Total time of the visit 112 minutes  Non physical exam/ non charting time: 85 minutes   Start Time:1000  Stop Time:1152  Description of non physical exam/non charting time: counseling patient on clinical conditions and therapies provided regarding pain management, therapy plan of care, BM regulation.  Extensive chart review completed including all consultation notes.  All pertinent laboratory and radiographical images reviewed.        Anat Dawkins NP          Patient note was created using MModal Dictation.  Any errors in syntax or even information may not have been identified and edited on initial review prior to signing this note.

## 2019-11-20 NOTE — ED PROVIDER NOTES
Encounter Date: 11/20/2019    SCRIBE #1 NOTE: I, Ivette Matthew, am scribing for, and in the presence of,  Ezekiel Short MD. I have scribed the entire note.       History     Chief Complaint   Patient presents with    Altered Mental Status     Pt presents to ED today from [facility] finished OT and speech therapy pt became unresponsive. pt has hisotry of narcolepsy. pt arrives to ED awake and alert      Annette Garcia is a 84 y.o. 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    The patient presents to the ED due to brief syncopal episode at [facility] after physical therapy. The staff report that she was unresponsive for a few minutes. There was no seizure like activity. The patient denies visual disturbance, confusion, fever, chills, sore throat, SOB, CP, abdominal pain, nausea, vomiting, difficulty urinating, changes in urinary frequency or urgency, back pain, HA, rash, any open wounds, or weakness.        The history is provided by the patient.     Review of patient's allergies indicates:   Allergen Reactions    Adhesive Itching and Blisters    Penicillins Anaphylaxis    Tramadol Hives    Avelox [moxifloxacin] Rash     Facial and arm itching and redness. Pt states throat closes when given.    Amoxil [amoxicillin]     Aspridrox [aspirin, buffered]     Codeine Other (See Comments)     Throat swelling    Keflex [cephalexin]      Tolerated cefepime and cefazolin    Norvasc [amlodipine]     Red dye Hives    Robitussin [guaifenesin]     Sulfa (sulfonamide antibiotics)     Tylenol [acetaminophen]      Has reaction to Tylenol with red dye and unable to take Extra Strength Tylenol/ CAN ONLY TOLERATE REG STRENGTH TYLENOL    Vicks vaporub [camphor-eucalyptus oil-menthol]      Past Medical History:   Diagnosis Date    Cancer     colon     Hypertension     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    In the setting of septic shock and near death    Petit mal epilepsy 1954    Scoliosis of lumbar spine     Seizures     Unspecified hypothyroidism      Past Surgical History:   Procedure Laterality Date    APPENDECTOMY      BACK SURGERY  1988    vertebral fracture    BACK SURGERY  02/2013    lumbar L2-5    CATARACT EXTRACTION, BILATERAL Bilateral     CHOLECYSTECTOMY      open    EYE SURGERY Bilateral     cataract removal with lens implant    HYSTERECTOMY      PORTACATH PLACEMENT Right 09/2016    RENAL ARTERY STENT Left 07/19/2017    SIGMOIDECTOMY  10/29/2019    SMALL INTESTINE SURGERY  08/23/2016    TONSILLECTOMY      VAGINAL HYSTERECTOMY W/ ANTERIOR AND POSTERIOR VAGINAL REPAIR       Family History   Problem Relation Age of Onset    Pancreatic cancer Mother     Hypertension Father     Heart attack Father      Social History     Tobacco Use    Smoking status: Never Smoker    Smokeless tobacco: Never Used   Substance Use Topics    Alcohol use: No    Drug use: No     Review of Systems   Constitutional: Negative for chills and fever.   HENT: Negative for sore throat.    Eyes: Negative for visual disturbance.   Respiratory: Negative for shortness of breath.    Cardiovascular: Negative for chest pain.   Gastrointestinal: Negative for abdominal pain, nausea and vomiting.   Genitourinary: Negative for difficulty urinating, frequency and urgency.   Musculoskeletal: Negative for back pain.   Skin: Negative for rash and wound.   Neurological: Positive for syncope. Negative for weakness and headaches.   Psychiatric/Behavioral: Negative for agitation, behavioral problems and confusion.       Physical Exam     Initial Vitals [11/20/19 1335]   BP Pulse Resp Temp SpO2   (!) 127/54 69 16 98.3 °F (36.8 °C) (!) 93 %      MAP       --         Physical Exam    Nursing note and vitals reviewed.  Constitutional: She appears well-developed and  well-nourished. She is not diaphoretic. No distress.   HENT:   Head: Normocephalic and atraumatic.   Mouth/Throat: Oropharynx is clear and moist.   Eyes: Conjunctivae and EOM are normal. Pupils are equal, round, and reactive to light.   Neck: Normal range of motion. Neck supple.   Cardiovascular: Normal rate, regular rhythm and normal heart sounds. Exam reveals no gallop and no friction rub.    No murmur heard.  Pulmonary/Chest: Breath sounds normal. She has no wheezes. She has no rhonchi. She has no rales.   Port-A-Cath to right anterior chest wall   Abdominal: Soft. Bowel sounds are normal. There is no tenderness. There is no rebound and no guarding.   Well healing midline abdominal surgeon; staples intact and in place; no sign of infection noted; abdomen is soft, nontender nondistended normal bowel sounds in all 4 quadrants   Musculoskeletal: Normal range of motion. She exhibits no edema or tenderness.   Lymphadenopathy:     She has no cervical adenopathy.   Neurological: She is alert and oriented to person, place, and time. She has normal strength.   Normal strength throughout all 4 extremities  Sensation grossly intact  GCS 15   Skin: Skin is warm and dry. Capillary refill takes less than 2 seconds. No rash noted.         ED Course   Procedures  Labs Reviewed   URINALYSIS, REFLEX TO URINE CULTURE - Abnormal; Notable for the following components:       Result Value    Protein, UA Trace (*)     Leukocytes, UA Trace (*)     All other components within normal limits    Narrative:     Preferred Collection Type->Urine, Clean Catch   URINALYSIS MICROSCOPIC - Abnormal; Notable for the following components:    WBC, UA 6 (*)     All other components within normal limits    Narrative:     Preferred Collection Type->Urine, Clean Catch   CBC W/ AUTO DIFFERENTIAL - Abnormal; Notable for the following components:    RBC 2.70 (*)     Hemoglobin 9.2 (*)     Hematocrit 29.6 (*)     Mean Corpuscular Volume 110 (*)     Mean  Corpuscular Hemoglobin 34.1 (*)     Mean Corpuscular Hemoglobin Conc 31.1 (*)     RDW 19.3 (*)     All other components within normal limits   COMPREHENSIVE METABOLIC PANEL - Abnormal; Notable for the following components:    Potassium 3.4 (*)     CO2 30 (*)     Calcium 8.5 (*)     Total Protein 5.4 (*)     Albumin 2.8 (*)     AST 42 (*)     All other components within normal limits   B-TYPE NATRIURETIC PEPTIDE - Abnormal; Notable for the following components:     (*)     All other components within normal limits   CULTURE, BLOOD   CULTURE, BLOOD   TROPONIN I   CK     EKG Readings: (Independently Interpreted)   Sinus rhythm; no significant ST elevation/depression/Twave changes; no STEMI           X-Rays:   Independently Interpreted Readings:   Other Readings:  Reviewed by myself, read by radiology.     Imaging Results          CT Head Without Contrast (Final result)  Result time 11/20/19 15:31:16    Final result by Ángel Gilman MD (11/20/19 15:31:16)                 Impression:      No acute large vascular territory infarct or intracranial hemorrhage identified.  Further evaluation/follow-up as warranted.    Generalized cerebral volume loss, supratentorial white matter chronic small vessel ischemic change and right basal ganglia remote lacunar type infarct.      Electronically signed by: Ángel Gilman MD  Date:    11/20/2019  Time:    15:31             Narrative:    EXAMINATION:  CT HEAD WITHOUT CONTRAST    CLINICAL HISTORY:  Syncope/fainting;    TECHNIQUE:  Low dose axial CT images obtained throughout the head without intravenous contrast. Sagittal and coronal reconstructions were performed.    COMPARISON:  3/13/2017    FINDINGS:  Intracranial compartment:    Generalized cerebral volume loss.  Ventricles are midline and stable in size configuration without distortion by mass effect or acute hydrocephalus.  No extra-axial blood or fluid collections.  Empty sella configuration similar to prior,  nonspecific.    Right basal ganglia remote lacunar type infarct unchanged.  Mild periventricular white matter hypoattenuation, likely sequela of chronic small vessel ischemic change.  No definite new focal areas of abnormal parenchymal attenuation.  No parenchymal mass, hemorrhage, edema or major vascular distribution infarct.  Skull base advanced atherosclerotic vascular calcifications noted.    Skull/extracranial contents (limited evaluation): Hyperostosis frontalis interna.  No fracture. Mastoid air cells and paranasal sinuses are essentially clear.                               X-Ray Chest 1 View (Final result)  Result time 11/20/19 14:48:32    Final result by Ally Garcia MD (11/20/19 14:48:32)                 Impression:      No failure or pneumonia or nodule.      Electronically signed by: Ally Garcia  Date:    11/20/2019  Time:    14:48             Narrative:    EXAMINATION:  XR CHEST 1 VIEW    CLINICAL HISTORY:  Unspecified coma    TECHNIQUE:  Single frontal view of the chest was performed.    COMPARISON:  11/01/2019 chest    FINDINGS:  Single AP portable view at 14:43.    Right IJ catheter terminates at the expected location of the mid SVC.  There are overlying leads.  The right upper quadrant cholecystectomy clips perhaps heart is normal size.  Modest inspiratory exam with mild rotation towards the left with semi lordotic positioning.  No pneumothorax or pleural effusion or interstitial edema consolidation or nodule.  There is old appearing fracture deformity of the right humeral head unchanged.  No abdominal free air.                              Medical Decision Making:   History:   Old Medical Records: I decided to obtain old medical records.  Clinical Tests:   Lab Tests: Reviewed and Ordered  Radiological Study: Reviewed and Ordered  Medical Tests: Reviewed and Ordered  ED Management:  - EKG Sinus rhythm; no significant ST elevation/depression/Twave changes; no STEMI  - Urinalysis microscopic  with trace leuks, but otherwise WNL   - CBC w/diff WNL; H/H stable; no significant leukocytosis  - CMP WNL   - Troponin I WNL   - BNP WNL  - CPK WNL   - CT Head w/o contrast WO negative for acute intracranial abnormality   - CXR without acute cardiopulmonary process per my interpretation, final radiology read   - In light of pt's history, age, I will advocate for admission for observation  - Pt and family in agreement with plan for admission                                   Clinical Impression:       ICD-10-CM ICD-9-CM   1. Loss of consciousness R40.20 780.09   2. Hypotension, unspecified hypotension type I95.9 458.9         Disposition:   Disposition: Placed in Observation        I, Ezekiel Short,  personally performed the services described in this documentation. All medical record entries made by the scribe were at my direction and in my presence.  I have reviewed the chart and agree that the record reflects my personal performance and is accurate and complete. Ezekiel Short M.D. 5:52 PM11/20/2019             Ezekiel Short MD  11/20/19 [id number]

## 2019-11-20 NOTE — PROGRESS NOTES
PT/OT/ST    *Transfer mobility (eg, from bed to chair)   Independent (patient can perform an activity safely and independently without any devices, physical assistance, or supervision)  Modified independent (patient requires the use of an assistive device or extra time to complete an activity, but is otherwise independent)  Contact guard assistance (patient can perform an activity independently, but needs constant physical touch to steady or guide to ensure safe performance)  Supervision (patient can perform an activity, but supervision is provided to address safety concerns)  Minimum assistance (patient can perform most of an activity (ie, approximately 75%), but requires limited assistance from one person for safe completion)  Moderate assistance (patient can perform about half the effort of an activity, but requires extensive assistance from one or more persons for safe performance of the other half)  Maximum assistance (patient has difficulty performing an activity, but can offer some assistance (eg, approximately 25% of effort) and is dependent on one or more people to assist for safe completion)  Dependent or unable (patient is unable to perform an activity or depends on 2 or more people to complete it  *Not specified    Ambulation inside (eg, within room, hallways, to toilet) _____Ft  Independent  Modified independent  Contact guard assistance  Supervision  Minimum assistance  Moderate assistance  Maximum assistance  Dependent or unable  Not specified    *Ambulation outside (eg, getting in or out of buildings, walking on uneven surfaces) ______Ft  Independent  Modified independent  Contact guard assistance  Supervision  Minimum assistance  Moderate assistance  Maximum assistance  Dependent or unable    *Toileting self-management:   Independent  Modified independent  Contact guard assistance  Supervision  Minimum assistance  Moderate assistance  Maximum assistance  Dependent or unable  Not  specified    *Nourishment self-management (ie, ability to feed self)   Independent  Modified independent  Supervision  Minimum assistance  Moderate assistance  Maximum assistance  Dependent or unable  Not specified  *Personal care self-management (eg, dressing, bathing, brushing teeth, washing hair)   Independent  Modified independent  Contact guard assistance  Supervision  Minimum assistance  Moderate assistance  Maximum assistance  Dependent or unable  Not specified    Medication support (ie, preparing/taking all prescribed and over-the-counter medications reliably and safely, including correct dosage at correct times)   Not applicable  Independent  Modified independent  Supervision  Minimum assistance  Moderate assistance  Maximum assistance  Not specified    ADL adaptive devices self-management (ie, ability to manage putting on and/or removing braces, splints, and other adaptive devices)   Not applicable  Independent  Modified independent  Supervision  Minimum assistance  Moderate assistance  Maximum assistance  Dependent or unable  Not specified          For Extensions  - Unanticipated functional problems impacting safe completion of therapy  - Multiple comorbidities or frailty impairing functional improvement  - Cognitive impairment requiring additional intervention and education  - Communication impairment requiring additional intervention and education  - Assessment or care unmanageable at lower level of care  - Expect brief to moderate stay extension.    IQ performed for extension    Criteria Met    Criteria Not Met    SNF notified    Barriers to progress:    New treatments:    Projected length of stay/ expected discharge:    Discharge Disposition:  Recommendations:

## 2019-11-21 LAB
ANION GAP SERPL CALC-SCNC: 9 MMOL/L (ref 8–16)
BASOPHILS # BLD AUTO: 0.01 K/UL (ref 0–0.2)
BASOPHILS NFR BLD: 0.2 % (ref 0–1.9)
BUN SERPL-MCNC: 9 MG/DL (ref 8–23)
CALCIUM SERPL-MCNC: 8.2 MG/DL (ref 8.7–10.5)
CHLORIDE SERPL-SCNC: 98 MMOL/L (ref 95–110)
CO2 SERPL-SCNC: 29 MMOL/L (ref 23–29)
CREAT SERPL-MCNC: 0.7 MG/DL (ref 0.5–1.4)
DIFFERENTIAL METHOD: ABNORMAL
EOSINOPHIL # BLD AUTO: 0.2 K/UL (ref 0–0.5)
EOSINOPHIL NFR BLD: 5 % (ref 0–8)
ERYTHROCYTE [DISTWIDTH] IN BLOOD BY AUTOMATED COUNT: 19.5 % (ref 11.5–14.5)
EST. GFR  (AFRICAN AMERICAN): >60 ML/MIN/1.73 M^2
EST. GFR  (NON AFRICAN AMERICAN): >60 ML/MIN/1.73 M^2
GLUCOSE SERPL-MCNC: 93 MG/DL (ref 70–110)
HCT VFR BLD AUTO: 29.4 % (ref 37–48.5)
HGB BLD-MCNC: 9 G/DL (ref 12–16)
LYMPHOCYTES # BLD AUTO: 1.1 K/UL (ref 1–4.8)
LYMPHOCYTES NFR BLD: 25.8 % (ref 18–48)
MAGNESIUM SERPL-MCNC: 1.3 MG/DL (ref 1.6–2.6)
MCH RBC QN AUTO: 33.6 PG (ref 27–31)
MCHC RBC AUTO-ENTMCNC: 30.6 G/DL (ref 32–36)
MCV RBC AUTO: 110 FL (ref 82–98)
MONOCYTES # BLD AUTO: 0.8 K/UL (ref 0.3–1)
MONOCYTES NFR BLD: 17 % (ref 4–15)
NEUTROPHILS # BLD AUTO: 2.3 K/UL (ref 1.8–7.7)
NEUTROPHILS NFR BLD: 52 % (ref 38–73)
PHOSPHATE SERPL-MCNC: 2.8 MG/DL (ref 2.7–4.5)
PLATELET # BLD AUTO: 222 K/UL (ref 150–350)
PMV BLD AUTO: 9.4 FL (ref 9.2–12.9)
POTASSIUM SERPL-SCNC: 3.3 MMOL/L (ref 3.5–5.1)
RBC # BLD AUTO: 2.68 M/UL (ref 4–5.4)
SODIUM SERPL-SCNC: 136 MMOL/L (ref 136–145)
TROPONIN I SERPL DL<=0.01 NG/ML-MCNC: 0.02 NG/ML (ref 0–0.03)
WBC # BLD AUTO: 4.42 K/UL (ref 3.9–12.7)

## 2019-11-21 PROCEDURE — G0378 HOSPITAL OBSERVATION PER HR: HCPCS

## 2019-11-21 PROCEDURE — 93010 ELECTROCARDIOGRAM REPORT: CPT | Mod: ,,, | Performed by: INTERNAL MEDICINE

## 2019-11-21 PROCEDURE — 25000003 PHARM REV CODE 250: Performed by: NURSE PRACTITIONER

## 2019-11-21 PROCEDURE — 97535 SELF CARE MNGMENT TRAINING: CPT

## 2019-11-21 PROCEDURE — 36415 COLL VENOUS BLD VENIPUNCTURE: CPT

## 2019-11-21 PROCEDURE — 63600175 PHARM REV CODE 636 W HCPCS: Performed by: NURSE PRACTITIONER

## 2019-11-21 PROCEDURE — 84484 ASSAY OF TROPONIN QUANT: CPT

## 2019-11-21 PROCEDURE — 97530 THERAPEUTIC ACTIVITIES: CPT

## 2019-11-21 PROCEDURE — 93005 ELECTROCARDIOGRAM TRACING: CPT

## 2019-11-21 PROCEDURE — 25000003 PHARM REV CODE 250: Performed by: FAMILY MEDICINE

## 2019-11-21 PROCEDURE — 99214 PR OFFICE/OUTPT VISIT, EST, LEVL IV, 30-39 MIN: ICD-10-PCS | Mod: ,,, | Performed by: INTERNAL MEDICINE

## 2019-11-21 PROCEDURE — 97166 OT EVAL MOD COMPLEX 45 MIN: CPT

## 2019-11-21 PROCEDURE — 85025 COMPLETE CBC W/AUTO DIFF WBC: CPT

## 2019-11-21 PROCEDURE — 99214 OFFICE O/P EST MOD 30 MIN: CPT | Mod: ,,, | Performed by: INTERNAL MEDICINE

## 2019-11-21 PROCEDURE — 96374 THER/PROPH/DIAG INJ IV PUSH: CPT | Performed by: EMERGENCY MEDICINE

## 2019-11-21 PROCEDURE — 83735 ASSAY OF MAGNESIUM: CPT

## 2019-11-21 PROCEDURE — 80048 BASIC METABOLIC PNL TOTAL CA: CPT

## 2019-11-21 PROCEDURE — 84100 ASSAY OF PHOSPHORUS: CPT

## 2019-11-21 PROCEDURE — 97161 PT EVAL LOW COMPLEX 20 MIN: CPT

## 2019-11-21 PROCEDURE — 93010 EKG 12-LEAD: ICD-10-PCS | Mod: ,,, | Performed by: INTERNAL MEDICINE

## 2019-11-21 RX ORDER — LISINOPRIL 5 MG/1
10 TABLET ORAL DAILY
Status: DISCONTINUED | OUTPATIENT
Start: 2019-11-21 | End: 2019-11-22

## 2019-11-21 RX ORDER — LANOLIN ALCOHOL/MO/W.PET/CERES
400 CREAM (GRAM) TOPICAL DAILY
Status: DISCONTINUED | OUTPATIENT
Start: 2019-11-21 | End: 2019-11-22 | Stop reason: HOSPADM

## 2019-11-21 RX ORDER — POTASSIUM CHLORIDE 20 MEQ/1
20 TABLET, EXTENDED RELEASE ORAL ONCE
Status: COMPLETED | OUTPATIENT
Start: 2019-11-21 | End: 2019-11-21

## 2019-11-21 RX ORDER — FUROSEMIDE 10 MG/ML
20 INJECTION INTRAMUSCULAR; INTRAVENOUS ONCE
Status: COMPLETED | OUTPATIENT
Start: 2019-11-21 | End: 2019-11-21

## 2019-11-21 RX ADMIN — CLONIDINE HYDROCHLORIDE 0.2 MG: 0.2 TABLET ORAL at 08:11

## 2019-11-21 RX ADMIN — ACETAMINOPHEN 325 MG: 325 TABLET ORAL at 12:11

## 2019-11-21 RX ADMIN — CARVEDILOL 25 MG: 25 TABLET, FILM COATED ORAL at 08:11

## 2019-11-21 RX ADMIN — FUROSEMIDE 20 MG: 10 INJECTION, SOLUTION INTRAMUSCULAR; INTRAVENOUS at 08:11

## 2019-11-21 RX ADMIN — ACETAMINOPHEN 650 MG: 325 TABLET ORAL at 08:11

## 2019-11-21 RX ADMIN — POTASSIUM CHLORIDE 20 MEQ: 1500 TABLET, EXTENDED RELEASE ORAL at 08:11

## 2019-11-21 RX ADMIN — Medication 400 MG: at 08:11

## 2019-11-21 RX ADMIN — PHENOBARBITAL 64.8 MG: 32.4 TABLET ORAL at 08:11

## 2019-11-21 RX ADMIN — VITAMIN D, TAB 1000IU (100/BT) 1000 UNITS: 25 TAB at 12:11

## 2019-11-21 RX ADMIN — LEVOTHYROXINE SODIUM 125 MCG: 75 TABLET ORAL at 08:11

## 2019-11-21 RX ADMIN — OXCARBAZEPINE 300 MG: 150 TABLET, FILM COATED ORAL at 08:11

## 2019-11-21 RX ADMIN — LISINOPRIL 10 MG: 5 TABLET ORAL at 08:11

## 2019-11-21 RX ADMIN — PRAVASTATIN SODIUM 20 MG: 10 TABLET ORAL at 08:11

## 2019-11-21 NOTE — ASSESSMENT & PLAN NOTE
-reported syncope per rehab staff  -patient does not recall event  -denies any chest pain or SOB prior to event  -orthostatics positive; recommend IVF if orthostatics remain positive  -etiology likely orthostatic vs recurrent pause vs other non cardiac etiology; non cardiac etiology less likely  -continue to monitor on telemetry; recommend discontinuation of BB  -will need cardiac event monitor as an outpatient and will likely need to be deferred until discharged from rehab

## 2019-11-21 NOTE — PT/OT/SLP EVAL
Physical Therapy Evaluation    Patient Name:  Annette Garcia   MRN:  [id number]    Recommendations:     Discharge Recommendations:  nursing facility, skilled   Discharge Equipment Recommendations: none   Barriers to discharge: None    Assessment:     Annette Garcai is a 84 y.o. female admitted with a medical diagnosis of Syncope.  She presents with the following impairments/functional limitations:  weakness, gait instability, decreased ROM, decreased upper extremity function, decreased lower extremity function, impaired balance, impaired functional mobilty, impaired self care skills, impaired cardiopulmonary response to activity . Patient with ongoing decline in functional mobility with gait disturbance. Patient would benefit to return to SNF .    Rehab Prognosis: Good; patient would benefit from acute skilled PT services to address these deficits and reach maximum level of function.    Recent Surgery: * No surgery found *      Plan:     During this hospitalization, patient to be seen 6 x/week to address the identified rehab impairments via gait training, therapeutic activities, therapeutic exercises and progress toward the following goals:    · Plan of Care Expires:  12/21/19    Subjective     Chief Complaint: weakness generalized  Patient/Family Comments/goals: go home  Pain/Comfort:  · Pain Rating 1: 0/10  · Pain Rating Post-Intervention 1: 0/10    Patients cultural, spiritual, Gnosticism conflicts given the current situation:      Living Environment:  Lived alone [facility] with 2 steps to enter  Prior to admission, patients level of function was needs assistance.  Equipment used at home: wheelchair, walker, rolling, shower chair, bedside commode.  DME owned (not currently used): none.  Upon discharge, patient will have assistance from family/staff.    Objective:     Communicated with primary nurse prior to session.  Patient found HOB elevated with gonzales catheter, telemetry, SCD, bed alarm  upon PT entry to  room.    General Precautions: Standard, fall   Orthopedic Precautions:N/A   Braces: N/A     Exams:  · RLE ROM: limited knee and ankle active and passive  · RLE Strength: 3/5 to -4/5  · LLE ROM: limited knee and ankle active and passive  · LLE Strength: 3/5 to -4/5    Functional Mobility:  · Bed Mobility:     · Supine to Sit: moderate assistance and maximal assistance  · Sit to Supine: moderate assistance and maximal assistance  · Transfers:     · Sit to Stand:  moderate assistance with rolling walker  · Balance: poor with AD   · Unable to take steps with RW        Therapeutic Activities and Exercises:  Stood with RW flexed posture trunk,hips and knees, Mod assist with scooting towards head of bed in sitting.    AM-PAC 6 CLICK MOBILITY  Total Score:10     Patient left HOB elevated with all lines intact, call button in reach and bed alarm on.    GOALS:   Multidisciplinary Problems     Physical Therapy Goals        Problem: Physical Therapy Goal    Goal Priority Disciplines Outcome Goal Variances Interventions   Physical Therapy Goal     PT, PT/OT Ongoing, Progressing     Description:  Goals to be met by: 2019     Patient will increase functional independence with mobility by performin. Supine to sit with MInimal Assistance  2. Sit to stand transfer with Minimal Assistance   3. Bed to chair transfer with Moderate Assistance using Rolling Walker  4. Gait  x 15 feet with Moderate Assistance using Rolling Walker.                       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    In the setting of septic shock and near death    Petit mal epilepsy     Scoliosis of lumbar spine     Seizures     Unspecified hypothyroidism        Past Surgical History:   Procedure Laterality Date    APPENDECTOMY      BACK SURGERY      vertebral fracture    BACK SURGERY  2013    lumbar L2-5    CATARACT EXTRACTION, BILATERAL  Bilateral     CHOLECYSTECTOMY      open    EYE SURGERY Bilateral     cataract removal with lens implant    HYSTERECTOMY      PORTACATH PLACEMENT Right 09/2016    RENAL ARTERY STENT Left 07/19/2017    SIGMOIDECTOMY  10/29/2019    SMALL INTESTINE SURGERY  08/23/2016    TONSILLECTOMY      VAGINAL HYSTERECTOMY W/ ANTERIOR AND POSTERIOR VAGINAL REPAIR         Time Tracking:     PT Received On: 11/21/19  PT Start Time: 1055     PT Stop Time: 1122  PT Total Time (min): 27 min     Billable Minutes: Evaluation 12 and Therapeutic Activity 14      Hari Ramos, PT  11/21/2019

## 2019-11-21 NOTE — SUBJECTIVE & OBJECTIVE
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    In the setting of septic shock and near death    Petit mal epilepsy 1954    Scoliosis of lumbar spine     Seizures     Unspecified hypothyroidism        Past Surgical History:   Procedure Laterality Date    APPENDECTOMY      BACK SURGERY  1988    vertebral fracture    BACK SURGERY  02/2013    lumbar L2-5    CATARACT EXTRACTION, BILATERAL Bilateral     CHOLECYSTECTOMY      open    EYE SURGERY Bilateral     cataract removal with lens implant    HYSTERECTOMY      PORTACATH PLACEMENT Right 09/2016    RENAL ARTERY STENT Left 07/19/2017    SIGMOIDECTOMY  10/29/2019    SMALL INTESTINE SURGERY  08/23/2016    TONSILLECTOMY      VAGINAL HYSTERECTOMY W/ ANTERIOR AND POSTERIOR VAGINAL REPAIR         Review of patient's allergies indicates:   Allergen Reactions    Adhesive Itching and Blisters    Penicillins Anaphylaxis    Tramadol Hives    Avelox [moxifloxacin] Rash     Facial and arm itching and redness. Pt states throat closes when given.    Amoxil [amoxicillin]     Aspridrox [aspirin, buffered]     Codeine Other (See Comments)     Throat swelling    Keflex [cephalexin]      Tolerated cefepime and cefazolin    Norvasc [amlodipine]     Red dye Hives    Robitussin [guaifenesin]     Sulfa (sulfonamide antibiotics)     Tylenol [acetaminophen]      Has reaction to Tylenol with red dye and unable to take Extra Strength Tylenol/ CAN ONLY TOLERATE REG STRENGTH TYLENOL    Vicks vaporub [camphor-eucalyptus oil-menthol]        No current facility-administered medications on file prior to encounter.      Current Outpatient Medications on File Prior to Encounter   Medication Sig    acetaminophen (TYLENOL) 325 MG tablet Take 2 tablets (650 mg total) by mouth every 4 (four) hours as needed for Pain.    calcium carbonate (OS-RICHARDSON) 600 mg calcium (1,500 mg) Tab Take 0.5 tablets (300 mg total)  by mouth once daily.    carvedilol (COREG) 25 MG tablet Take 1 tablet (25 mg total) by mouth 2 (two) times daily with meals.    denosumab (PROLIA) 60 mg/mL Syrg Inject 60 mg into the skin every 6 (six) months.    levothyroxine (SYNTHROID) 125 MCG tablet TAKE 1 TABLET (125 MCG TOTAL) BY MOUTH ONCE DAILY.    lisinopril (PRINIVIL,ZESTRIL) 40 MG tablet Take 1 tablet (40 mg total) by mouth once daily.    magnesium 250 mg Tab Take 1 tablet (250 mg total) by mouth once daily.    ondansetron (ZOFRAN) 4 MG tablet Take 1 tablet (4 mg total) by mouth every 8 (eight) hours as needed for Nausea.    OXcarbazepine (TRILEPTAL) 300 MG Tab Take 1 tablet (300 mg total) by mouth nightly.    PHENobarbital (LUMINAL) 64.8 MG tablet Take 1 tablet (64.8 mg total) by mouth every evening.    polyethylene glycol (GLYCOLAX) 17 gram PwPk Take 17 g by mouth 2 (two) times daily as needed (Constipation).    pravastatin (PRAVACHOL) 20 MG tablet Take 1 tablet (20 mg total) by mouth once daily.    vitamin D (VITAMIN D3) 1000 units Tab Take 1,000 Units by mouth once daily.    cloNIDine (CATAPRES) 0.2 MG tablet Take 1 tablet (0.2 mg total) by mouth 2 (two) times daily.    oxybutynin (DITROPAN) 5 MG Tab One po every 6 hours as need for bladder irritation    triamcinolone acetonide 0.1% (KENALOG) 0.1 % cream Apply topically 2 (two) times daily. When needed for sores on her head     Family History     Problem Relation (Age of Onset)    Heart attack Father    Hypertension Father    Pancreatic cancer Mother        Tobacco Use    Smoking status: Never Smoker    Smokeless tobacco: Never Used   Substance and Sexual Activity    Alcohol use: No    Drug use: No    Sexual activity: Not on file     Review of Systems   Constitutional: Negative for chills, diaphoresis and fever.   Eyes: Negative for photophobia.   Respiratory: Negative for cough, chest tightness, shortness of breath and wheezing.    Cardiovascular: Negative for chest pain,  palpitations and leg swelling.   Gastrointestinal: Negative for abdominal pain, diarrhea, nausea and vomiting.   Genitourinary: Negative for dysuria, flank pain, frequency and hematuria.   Musculoskeletal: Negative for back pain and myalgias.   Neurological: Negative for dizziness, syncope, light-headedness and headaches.   Psychiatric/Behavioral: Negative for confusion.     Objective:     Vital Signs (Most Recent):  Temp: 98.8 °F (37.1 °C) (11/21/19 1624)  Pulse: 77 (11/21/19 1624)  Resp: 19 (11/21/19 1624)  BP: (!) 165/73 (11/21/19 1624)  SpO2: (!) 93 % (11/21/19 1624) Vital Signs (24h Range):  Temp:  [97.4 °F (36.3 °C)-98.8 °F (37.1 °C)] 98.8 °F (37.1 °C)  Pulse:  [71-85] 77  Resp:  [16-27] 19  SpO2:  [90 %-95 %] 93 %  BP: (120-184)/(54-76) 165/73     Weight: 59.6 kg (131 lb 6.3 oz)  Body mass index is 28.43 kg/m².    Physical Exam   Constitutional: She is oriented to person, place, and time. She appears well-developed and well-nourished.   Frail    HENT:   Head: Normocephalic and atraumatic.   Eyes: Pupils are equal, round, and reactive to light. Conjunctivae are normal.   Neck: Normal range of motion. No JVD present.   Cardiovascular: Normal rate, regular rhythm, normal heart sounds and intact distal pulses.   Pulmonary/Chest: Effort normal. No respiratory distress. She has no wheezes.   Abdominal: Soft. Bowel sounds are normal. She exhibits no distension. There is no tenderness. There is no guarding.   Musculoskeletal: Normal range of motion. She exhibits no edema or tenderness.   Neurological: She is alert and oriented to person, place, and time. No cranial nerve deficit.   Skin: Skin is warm and dry. Capillary refill takes less than 2 seconds.   Psychiatric: She has a normal mood and affect. Her behavior is normal.         CRANIAL NERVES     CN III, IV, VI   Pupils are equal, round, and reactive to light.       Significant Labs:   BMP:   Recent Labs   Lab 11/21/19  0215   GLU 93      K 3.3*   CL 98    CO2 29   BUN 9   CREATININE 0.7   CALCIUM 8.2*     CBC:   Recent Labs   Lab 11/20/19  1437 11/21/19  0214   WBC 5.45 4.42   HGB 9.2* 9.0*   HCT 29.6* 29.4*    222       Significant Imaging: I have reviewed all pertinent imaging results/findings within the past 24 hours.

## 2019-11-21 NOTE — ASSESSMENT & PLAN NOTE
-BP 140s-170s/60s-70s  -on Coreg, Clonidine and Lisinopril at home  -continue to hold Coreg  -orthostatics positive; recommend IVF if remains positive  -titrate ACEI as BP tolerates

## 2019-11-21 NOTE — PLAN OF CARE
Future Appointments   Date Time Provider Department Center   11/27/2019  3:20 PM Chris Johnson MD [facility] GENSUR [facility]   12/6/2019  1:00 PM Carleen Estrada MD [facility] UROLOGY [facility]   12/12/2019 10:00 AM Selam Mills MD [facility] MAREN [facility]   12/17/2019  9:40 AM Joes Valles MD [facility] MED [facility]   4/2/2020 11:00 AM CHAIR 07 [facility] CHEMO [facility]     Patient from [facility]  Family at bedside    Dc plan to return to [facility]  Asked MD to reorder PT/OT/ST    This  put name on white board and explained blue discharge folder to patient. Discharge planning brochure and/or business card given to patient.  Patient verbalized understanding.       11/21/19 1012   Discharge Assessment   Assessment Type Discharge Planning Assessment   Confirmed/corrected address and phone number on facesheet? Yes   Assessment information obtained from? Patient;Caregiver   Prior to hospitilization cognitive status: Alert/Oriented   Prior to hospitalization functional status: Assistive Equipment   Current cognitive status: Alert/Oriented   Current Functional Status: Assistive Equipment;Independent;Needs Assistance   Facility Arrived From: [facility]   Lives With facility resident   Able to Return to Prior Arrangements yes   Is patient able to care for self after discharge? No   Patient's perception of discharge disposition skilled nursing facility   Readmission Within the Last 30 Days other (see comments)  (mental status changes)   Patient currently receives any other outside agency services? No   Equipment Currently Used at Home wheelchair;walker, rolling   Do you have any problems affording any of your prescribed medications? No   Is the patient taking medications as prescribed? yes   Does the patient have transportation home? Yes   Transportation Anticipated other (see comments)   DME Needed Upon Discharge  none   Patient/Family in Agreement with Plan yes     Soraida De Souza RN, CCM,  CMSRN  RN Transition Navigator  [phone]

## 2019-11-21 NOTE — SUBJECTIVE & OBJECTIVE
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    In the setting of septic shock and near death    Petit mal epilepsy 1954    Scoliosis of lumbar spine     Seizures     Unspecified hypothyroidism        Past Surgical History:   Procedure Laterality Date    APPENDECTOMY      BACK SURGERY  1988    vertebral fracture    BACK SURGERY  02/2013    lumbar L2-5    CATARACT EXTRACTION, BILATERAL Bilateral     CHOLECYSTECTOMY      open    EYE SURGERY Bilateral     cataract removal with lens implant    HYSTERECTOMY      PORTACATH PLACEMENT Right 09/2016    RENAL ARTERY STENT Left 07/19/2017    SIGMOIDECTOMY  10/29/2019    SMALL INTESTINE SURGERY  08/23/2016    TONSILLECTOMY      VAGINAL HYSTERECTOMY W/ ANTERIOR AND POSTERIOR VAGINAL REPAIR         Review of patient's allergies indicates:   Allergen Reactions    Adhesive Itching and Blisters    Penicillins Anaphylaxis    Tramadol Hives    Avelox [moxifloxacin] Rash     Facial and arm itching and redness. Pt states throat closes when given.    Amoxil [amoxicillin]     Aspridrox [aspirin, buffered]     Codeine Other (See Comments)     Throat swelling    Keflex [cephalexin]      Tolerated cefepime and cefazolin    Norvasc [amlodipine]     Red dye Hives    Robitussin [guaifenesin]     Sulfa (sulfonamide antibiotics)     Tylenol [acetaminophen]      Has reaction to Tylenol with red dye and unable to take Extra Strength Tylenol/ CAN ONLY TOLERATE REG STRENGTH TYLENOL    Vicks vaporub [camphor-eucalyptus oil-menthol]        No current facility-administered medications on file prior to encounter.      Current Outpatient Medications on File Prior to Encounter   Medication Sig    acetaminophen (TYLENOL) 325 MG tablet Take 2 tablets (650 mg total) by mouth every 4 (four) hours as needed for Pain.    calcium carbonate (OS-RICHARDSON) 600 mg calcium (1,500 mg) Tab Take 0.5 tablets (300 mg total)  by mouth once daily.    carvedilol (COREG) 25 MG tablet Take 1 tablet (25 mg total) by mouth 2 (two) times daily with meals.    cloNIDine (CATAPRES) 0.2 MG tablet Take 1 tablet (0.2 mg total) by mouth 2 (two) times daily.    denosumab (PROLIA) 60 mg/mL Syrg Inject 60 mg into the skin every 6 (six) months.    levothyroxine (SYNTHROID) 125 MCG tablet TAKE 1 TABLET (125 MCG TOTAL) BY MOUTH ONCE DAILY.    lisinopril (PRINIVIL,ZESTRIL) 40 MG tablet Take 1 tablet (40 mg total) by mouth once daily.    magnesium 250 mg Tab Take 1 tablet (250 mg total) by mouth once daily.    ondansetron (ZOFRAN) 4 MG tablet Take 1 tablet (4 mg total) by mouth every 8 (eight) hours as needed for Nausea.    OXcarbazepine (TRILEPTAL) 300 MG Tab Take 1 tablet (300 mg total) by mouth nightly.    oxybutynin (DITROPAN) 5 MG Tab One po every 6 hours as need for bladder irritation    PHENobarbital (LUMINAL) 64.8 MG tablet Take 1 tablet (64.8 mg total) by mouth every evening.    polyethylene glycol (GLYCOLAX) 17 gram PwPk Take 17 g by mouth 2 (two) times daily as needed (Constipation).    pravastatin (PRAVACHOL) 20 MG tablet Take 1 tablet (20 mg total) by mouth once daily.    triamcinolone acetonide 0.1% (KENALOG) 0.1 % cream Apply topically 2 (two) times daily. When needed for sores on her head    vitamin D (VITAMIN D3) 1000 units Tab Take 1,000 Units by mouth once daily.     Family History     Problem Relation (Age of Onset)    Heart attack Father    Hypertension Father    Pancreatic cancer Mother        Tobacco Use    Smoking status: Never Smoker    Smokeless tobacco: Never Used   Substance and Sexual Activity    Alcohol use: No    Drug use: No    Sexual activity: Not on file     Review of Systems   Constitutional: Negative for chills, diaphoresis and fever.   Eyes: Negative for photophobia.   Respiratory: Negative for cough, chest tightness, shortness of breath and wheezing.    Cardiovascular: Negative for chest pain,  palpitations and leg swelling.   Gastrointestinal: Negative for abdominal pain, diarrhea, nausea and vomiting.   Genitourinary: Negative for dysuria, flank pain, frequency and hematuria.   Musculoskeletal: Negative for back pain and myalgias.   Neurological: Positive for syncope. Negative for dizziness, light-headedness and headaches.   Psychiatric/Behavioral: Negative for confusion.     Objective:     Vital Signs (Most Recent):  Temp: 98.3 °F (36.8 °C) (11/20/19 1335)  Pulse: 74 (11/20/19 1831)  Resp: (!) 27 (11/20/19 1831)  BP: (!) 175/74 (11/20/19 1802)  SpO2: (!) 92 % (11/20/19 1831) Vital Signs (24h Range):  Temp:  [98.3 °F (36.8 °C)] 98.3 °F (36.8 °C)  Pulse:  [69-74] 74  Resp:  [16-27] 27  SpO2:  [90 %-95 %] 92 %  BP: (127-175)/(54-74) 175/74     Weight: 59.4 kg (131 lb)  Body mass index is 28.35 kg/m².    Physical Exam   Constitutional: She is oriented to person, place, and time. She appears well-developed and well-nourished.   Frail    HENT:   Head: Normocephalic and atraumatic.   Eyes: Pupils are equal, round, and reactive to light. Conjunctivae are normal.   Neck: Normal range of motion. No JVD present.   Cardiovascular: Normal rate, regular rhythm, normal heart sounds and intact distal pulses.   Pulmonary/Chest: Effort normal. No respiratory distress. She has no wheezes.   Abdominal: Soft. Bowel sounds are normal. She exhibits no distension. There is no tenderness. There is no guarding.   Musculoskeletal: Normal range of motion. She exhibits no edema or tenderness.   Neurological: She is alert and oriented to person, place, and time. No cranial nerve deficit.   Skin: Skin is warm and dry. Capillary refill takes less than 2 seconds.   Psychiatric: She has a normal mood and affect. Her behavior is normal.         CRANIAL NERVES     CN III, IV, VI   Pupils are equal, round, and reactive to light.       Significant Labs:   BMP:   Recent Labs   Lab 11/20/19  1437         K 3.4*   CL 96   CO2 30*    BUN 10   CREATININE 0.7   CALCIUM 8.5*     CBC:   Recent Labs   Lab 11/20/19  1437   WBC 5.45   HGB 9.2*   HCT 29.6*          Significant Imaging: I have reviewed all pertinent imaging results/findings within the past 24 hours.

## 2019-11-21 NOTE — ASSESSMENT & PLAN NOTE
Peripheral vascular disease, unspecified    Taking pravastatin, carvedilol, clonidine and lisinopril

## 2019-11-21 NOTE — PLAN OF CARE
VN rounds: VN cued into pt's room with pt's permission. Pt resting in bed. Fall risk protocol discussed with pt. VN instructed pt to call for assistance. Pt aware and agreeable.no c/o. Cauti education done.verbalized understanding. Pt stated janet has been in since her surgery, and experienced  Urinary retention.  NAD noted. Allowed time for questions.  Will cont to be available and intervene as needed.     11/21/19 1040   Type of Frequent Check   Type Patient Rounds;Other (see comments)  (vn round)   Safety/Activity   Patient Rounds visualized patient;call light in patient/parent reach   Safety Promotion/Fall Prevention instructed to call staff for mobility;Fall Risk reviewed with patient/family   Positioning   Body Position supine   Head of Bed (HOB) HOB elevated   Positioning/Transfer Devices in use;pillows   Pain/Comfort/Sleep   Preferred Pain Scale number (Numeric Rating Pain Scale)   Pain Rating (0-10): Rest 0   Sleep/Rest/Relaxation awake;no problem identified   Assessments (Pre/Post)   Level of Consciousness (AVPU) alert

## 2019-11-21 NOTE — PLAN OF CARE
Problem: Physical Therapy Goal  Goal: Physical Therapy Goal  Description  Goals to be met by: 2019     Patient will increase functional independence with mobility by performin. Supine to sit with MInimal Assistance  2. Sit to stand transfer with Minimal Assistance   3. Bed to chair transfer with Moderate Assistance using Rolling Walker  4. Gait  x 15 feet with Moderate Assistance using Rolling Walker.      Outcome: Ongoing, Progressing   Recommend return to SNF

## 2019-11-21 NOTE — HOSPITAL COURSE
No events on telemetry overnight. No additional episodes of syncope. Orthostatic vitals positive, fluids deferred secondary to hypertension (-180s). Will consult cardiology for evaluation prior to discharge.    Appreciates card's rec; d/c BB and FU outpatient with cards for possible Holter Monitor

## 2019-11-21 NOTE — ASSESSMENT & PLAN NOTE
Etiology unclear, neuro intact on exam, no acute findings on EKG. Patient with recent hospitalized 10/27-11/12/19. She developed atrial fibrillation with atrial pauses requiring temporary transvenous pacemaker, which was removed on 11/4. Pt denies chest pain and palpitations on exam.      Low suspicion for seizure activity   Monitor telemetry   Check orthostatic vitals

## 2019-11-21 NOTE — H&P
[facility] Medicine  History & Physical    Patient Name: Annette Garcia  MRN: [id number]  Admission Date: 11/20/2019  Attending Physician: Ezekiel Short MD   Primary Care Provider: Jose Vallse MD         Patient information was obtained from patient, relative(s), past medical records and ER records.     Subjective:     Principal Problem:Syncope    Chief Complaint:   Chief Complaint   Patient presents with    Altered Mental Status     Pt presents to ED today from [facility] finished OT and speech therapy pt became unresponsive. pt has hisotry of narcolepsy. pt arrives to ED awake and alert         HPI: Ms. Annette Garcia is an 83 yo female with a past medical history of peripheral vascular disease, renovascular hypertension, renal artery stenosis status post left renal artery stent placement on 7/19/17, coronary artery disease with history of myocardial infarction, diffuse large B cell lymphoma, anemia, epilepsy, hypothyroidism, chronic hyponatremia, depression, lumbar and sacroiliac joint osteoarthritis with chronic low back pain and history of lumbar spine surgery in 2013, slow transit constipation, with hx of small bowel resection on 8/23/16. Her primary care physician is Dr. Jose Torres.     Recently admitted 10/27-11/12/19 for management of E coli bacteremia. She developed atrial fibrillation and sinus pauses during hospital course. Cardiology was consulted and placed a temporary transvenous pacemaker, which was removed on 11/4.     Patient presented to ED 11/20/19 for brief syncopal episode at [facility] after physical therapy. The staff report that she was unresponsive for a few minutes. There was no seizure like activity. No bowel/bladder incontinence. Patient unable to recall event. SBP reportedly 70s. She denies visual disturbance, confusion, fever, chills, sore throat, SOB, CP, abdominal pain, nausea, vomiting, difficulty urinating,  changes in urinary frequency or urgency, back pain, HA, rash, any open wounds, or weakness. Per daughter patient has had similar events in the past.      Labs unremarkable. CT brain negative. CXR  Negative. VSS. Neuro intact. No acute findings on EKG. Patient admitted to [facility].     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    In the setting of septic shock and near death    Petit mal epilepsy 1954    Scoliosis of lumbar spine     Seizures     Unspecified hypothyroidism        Past Surgical History:   Procedure Laterality Date    APPENDECTOMY      BACK SURGERY  1988    vertebral fracture    BACK SURGERY  02/2013    lumbar L2-5    CATARACT EXTRACTION, BILATERAL Bilateral     CHOLECYSTECTOMY      open    EYE SURGERY Bilateral     cataract removal with lens implant    HYSTERECTOMY      PORTACATH PLACEMENT Right 09/2016    RENAL ARTERY STENT Left 07/19/2017    SIGMOIDECTOMY  10/29/2019    SMALL INTESTINE SURGERY  08/23/2016    TONSILLECTOMY      VAGINAL HYSTERECTOMY W/ ANTERIOR AND POSTERIOR VAGINAL REPAIR         Review of patient's allergies indicates:   Allergen Reactions    Adhesive Itching and Blisters    Penicillins Anaphylaxis    Tramadol Hives    Avelox [moxifloxacin] Rash     Facial and arm itching and redness. Pt states throat closes when given.    Amoxil [amoxicillin]     Aspridrox [aspirin, buffered]     Codeine Other (See Comments)     Throat swelling    Keflex [cephalexin]      Tolerated cefepime and cefazolin    Norvasc [amlodipine]     Red dye Hives    Robitussin [guaifenesin]     Sulfa (sulfonamide antibiotics)     Tylenol [acetaminophen]      Has reaction to Tylenol with red dye and unable to take Extra Strength Tylenol/ CAN ONLY TOLERATE REG STRENGTH TYLENOL    Vicks vaporub [camphor-eucalyptus oil-menthol]        No current facility-administered medications on file prior to  encounter.      Current Outpatient Medications on File Prior to Encounter   Medication Sig    acetaminophen (TYLENOL) 325 MG tablet Take 2 tablets (650 mg total) by mouth every 4 (four) hours as needed for Pain.    calcium carbonate (OS-RICHARDSON) 600 mg calcium (1,500 mg) Tab Take 0.5 tablets (300 mg total) by mouth once daily.    carvedilol (COREG) 25 MG tablet Take 1 tablet (25 mg total) by mouth 2 (two) times daily with meals.    cloNIDine (CATAPRES) 0.2 MG tablet Take 1 tablet (0.2 mg total) by mouth 2 (two) times daily.    denosumab (PROLIA) 60 mg/mL Syrg Inject 60 mg into the skin every 6 (six) months.    levothyroxine (SYNTHROID) 125 MCG tablet TAKE 1 TABLET (125 MCG TOTAL) BY MOUTH ONCE DAILY.    lisinopril (PRINIVIL,ZESTRIL) 40 MG tablet Take 1 tablet (40 mg total) by mouth once daily.    magnesium 250 mg Tab Take 1 tablet (250 mg total) by mouth once daily.    ondansetron (ZOFRAN) 4 MG tablet Take 1 tablet (4 mg total) by mouth every 8 (eight) hours as needed for Nausea.    OXcarbazepine (TRILEPTAL) 300 MG Tab Take 1 tablet (300 mg total) by mouth nightly.    oxybutynin (DITROPAN) 5 MG Tab One po every 6 hours as need for bladder irritation    PHENobarbital (LUMINAL) 64.8 MG tablet Take 1 tablet (64.8 mg total) by mouth every evening.    polyethylene glycol (GLYCOLAX) 17 gram PwPk Take 17 g by mouth 2 (two) times daily as needed (Constipation).    pravastatin (PRAVACHOL) 20 MG tablet Take 1 tablet (20 mg total) by mouth once daily.    triamcinolone acetonide 0.1% (KENALOG) 0.1 % cream Apply topically 2 (two) times daily. When needed for sores on her head    vitamin D (VITAMIN D3) 1000 units Tab Take 1,000 Units by mouth once daily.     Family History     Problem Relation (Age of Onset)    Heart attack Father    Hypertension Father    Pancreatic cancer Mother        Tobacco Use    Smoking status: Never Smoker    Smokeless tobacco: Never Used   Substance and Sexual Activity    Alcohol use: No     Drug use: No    Sexual activity: Not on file     Review of Systems   Constitutional: Negative for chills, diaphoresis and fever.   Eyes: Negative for photophobia.   Respiratory: Negative for cough, chest tightness, shortness of breath and wheezing.    Cardiovascular: Negative for chest pain, palpitations and leg swelling.   Gastrointestinal: Negative for abdominal pain, diarrhea, nausea and vomiting.   Genitourinary: Negative for dysuria, flank pain, frequency and hematuria.   Musculoskeletal: Negative for back pain and myalgias.   Neurological: Positive for syncope. Negative for dizziness, light-headedness and headaches.   Psychiatric/Behavioral: Negative for confusion.     Objective:     Vital Signs (Most Recent):  Temp: 98.3 °F (36.8 °C) (11/20/19 1335)  Pulse: 74 (11/20/19 1831)  Resp: (!) 27 (11/20/19 1831)  BP: (!) 175/74 (11/20/19 1802)  SpO2: (!) 92 % (11/20/19 1831) Vital Signs (24h Range):  Temp:  [98.3 °F (36.8 °C)] 98.3 °F (36.8 °C)  Pulse:  [69-74] 74  Resp:  [16-27] 27  SpO2:  [90 %-95 %] 92 %  BP: (127-175)/(54-74) 175/74     Weight: 59.4 kg (131 lb)  Body mass index is 28.35 kg/m².    Physical Exam   Constitutional: She is oriented to person, place, and time. She appears well-developed and well-nourished.   Frail    HENT:   Head: Normocephalic and atraumatic.   Eyes: Pupils are equal, round, and reactive to light. Conjunctivae are normal.   Neck: Normal range of motion. No JVD present.   Cardiovascular: Normal rate, regular rhythm, normal heart sounds and intact distal pulses.   Pulmonary/Chest: Effort normal. No respiratory distress. She has no wheezes.   Abdominal: Soft. Bowel sounds are normal. She exhibits no distension. There is no tenderness. There is no guarding.   Musculoskeletal: Normal range of motion. She exhibits no edema or tenderness.   Neurological: She is alert and oriented to person, place, and time. No cranial nerve deficit.   Skin: Skin is warm and dry. Capillary refill takes  less than 2 seconds.   Psychiatric: She has a normal mood and affect. Her behavior is normal.         CRANIAL NERVES     CN III, IV, VI   Pupils are equal, round, and reactive to light.       Significant Labs:   BMP:   Recent Labs   Lab 11/20/19  1437         K 3.4*   CL 96   CO2 30*   BUN 10   CREATININE 0.7   CALCIUM 8.5*     CBC:   Recent Labs   Lab 11/20/19  1437   WBC 5.45   HGB 9.2*   HCT 29.6*          Significant Imaging: I have reviewed all pertinent imaging results/findings within the past 24 hours.    Assessment/Plan:     * Syncope  Etiology unclear, neuro intact on exam, no acute findings on EKG. Patient with recent hospitalized 10/27-11/12/19. She developed atrial fibrillation with atrial pauses requiring temporary transvenous pacemaker, which was removed on 11/4. Pt denies chest pain and palpitations on exam.      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

## 2019-11-21 NOTE — PT/OT/SLP EVAL
Occupational Therapy   Evaluation    Name: Annette Garcia  MRN: [id number]  Admitting Diagnosis:  Syncope      Recommendations:     Discharge Recommendations: nursing facility, skilled  Discharge Equipment Recommendations:  none  Barriers to discharge:  Decreased caregiver support, Inaccessible home environment(Limited static standing tolerance)    Assessment:     Annette Garcia is a 84 y.o. female with a medical diagnosis of Syncope.  She presents with deconditioning, concerns for orthostatic hypotension; MDs in room asked for orthostatics to be performed. Performance deficits affecting function: weakness, impaired functional mobilty, impaired endurance, gait instability, decreased coordination, decreased upper extremity function, decreased lower extremity function, impaired self care skills, impaired balance.      Rehab Prognosis: Fair; patient would benefit from acute skilled OT services to address these deficits and reach maximum level of function.       Plan:     Patient to be seen 5 x/week to address the above listed problems via self-care/home management, therapeutic activities, therapeutic exercises  · Plan of Care Expires: 12/21/19  · Plan of Care Reviewed with: patient, daughter, family    Subjective     Chief Complaint: Pt states mild dizziness upon seated EOB, increased dizziness in stance at EOB with RW  Patient/Family Comments/goals: To return to PLOF    Occupational Profile:  Living Environment: Pt arrived to hospital from [facility]; Pt lives in [address] with SC, GBs in showers and near toilet  Previous level of function: Mod I for ADLs and functional mobility   Roles and Routines: Caretaker to self and home, cooks and cleans but does not drive. Pt reports that family takes her to grocery store but she is able to push cart and obtain needed items  Equipment Used at Home:  walker, rolling, walker, standard, wheelchair, bedside commode, grab bar, shower chair  Assistance upon Discharge: SNF  Staff    Pain/Comfort:  · Pain Rating 1: 0/10    Patients cultural, spiritual, Caodaism conflicts given the current situation:      Objective:     Communicated with: nsg prior to session.  Patient found HOB elevated with SCD, peripheral IV, bed alarm, telemetry upon OT entry to room.    General Precautions: Standard, fall   Orthopedic Precautions:N/A   Braces: N/A     Occupational Performance:    Bed Mobility:    · Patient completed Rolling/Turning to Right with minimum assistance  · Patient completed Scooting/Bridging with moderate assistance  · Patient completed Supine to Sit with minimum assistance  · Patient completed Sit to Supine with moderate assistance    Functional Mobility/Transfers:  · Patient completed Sit <> Stand Transfer with moderate assistance and maximal assistance  with  rolling walker   Functional Mobility: Pt with fair dynamic seated balance and poor static standing balance. Pt with limited tolerance for standing trails this date, able to complete 2x sit<>stand; 1st attempt 5-10 seconds, 2nd attempt 2--30 seconds. Unable to obtain BP reading in stance this date 2/2 pt reporting dizziness and asking to return to seated     Activities of Daily Living:  · Feeding:  supervision to self feed from tray per family report  · Upper Body Dressing: moderate assistance to don/doff gown as robe  · Lower Body Dressing: total assistance to don/doff B socks seated EOB    Cognitive/Visual Perceptual:  Cognitive/Psychosocial Skills:     -       Oriented to: Person, Place, Time and Situation   -       Follows Commands/attention: Follows multistep  commands  -       Communication: clear/fluent, though whispered/strained voice quality  -       Memory: No Deficits noted  -       Safety awareness/insight to disability: intact   -       Mood/Affect/Coping skills/emotional control: Appropriate to situation    Physical Exam:  Postural examination/scapula alignment:    -       Rounded shoulders, Forward head, Kyphotic  posture  Skin integrity: Thin, abrasions L forearm  Sensation:    -       Intact  Motor Planning:    -       WFL  Dominant hand:    -       R handed  Upper Extremity Range of Motion: BUE WFL  Except B shoulder flex limited ~0-80* AROM, RUE ~0-90* PROM; LUE ~0-140* PROM  Upper Extremity Strength:  BUE 3+ to 4-/5 distall, 2/5 proximally   Strength:  B hands 4-/5  Fine Motor Coordination:    -       Intact  Gross motor coordination:   WFL      AMPAC 6 Click ADL:  AMPAC Total Score: 15    Treatment & Education:  Pt educated on role of OT and POC.   Pt performing skills as listed above.   Pt BP as follows:  Supine: 139/65 NE 76  Seated EOB: 125/60 NE 82  Education:  Standing: Unable to obtain   Returned to seated: 115/65 NE 75  Pt c/o mild dizziness seated EOB; increased in stance and intolerable dizziness reported after pt instructed to stand upright with forward facing posture.  Patient left HOB elevated with all lines intact, call button in reach, bed alarm on and nsg notified    GOALS:   Multidisciplinary Problems     Occupational Therapy Goals        Problem: Occupational Therapy Goal    Goal Priority Disciplines Outcome Interventions   Occupational Therapy Goal     OT, PT/OT Ongoing, Progressing    Description:  Goals to be met by: 12/21/2019     Patient will increase functional independence with ADLs by performing:      UE Dressing with Stand-by Assistance.  LE Dressing with Minimal Assistance.  Grooming while seated with Set-up Assistance.  Sitting at edge of bed x15 minutes with Supervision.  Rolling to Bilateral with Modified [place].   Stand pivot transfers with Stand-by Assistance.  Step transfer with Contact Guard Assistance  Toilet transfer to bedside commode with Contact Guard Assistance.  Increased functional strength to WFL for self care.  Upper extremity exercise program x10 reps per handout, with assistance as needed.                    History:     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    In the setting of septic shock and near death    Petit mal epilepsy 1954    Scoliosis of lumbar spine     Seizures     Unspecified hypothyroidism        Past Surgical History:   Procedure Laterality Date    APPENDECTOMY      BACK SURGERY  1988    vertebral fracture    BACK SURGERY  02/2013    lumbar L2-5    CATARACT EXTRACTION, BILATERAL Bilateral     CHOLECYSTECTOMY      open    EYE SURGERY Bilateral     cataract removal with lens implant    HYSTERECTOMY      PORTACATH PLACEMENT Right 09/2016    RENAL ARTERY STENT Left 07/19/2017    SIGMOIDECTOMY  10/29/2019    SMALL INTESTINE SURGERY  08/23/2016    TONSILLECTOMY      VAGINAL HYSTERECTOMY W/ ANTERIOR AND POSTERIOR VAGINAL REPAIR         Time Tracking:     OT Date of Treatment: 11/21/19  OT Start Time: 1309  OT Stop Time: 1339  OT Total Time (min): 30 min    Billable Minutes:Evaluation 15  Therapeutic Activity 15    Rita Burden OT  11/21/2019

## 2019-11-21 NOTE — ASSESSMENT & PLAN NOTE
Etiology unclear, neuro intact on exam, no acute findings on EKG. Patient with recent hospitalized 10/27-11/12/19. She developed atrial fibrillation with atrial pauses requiring temporary transvenous pacemaker, which was removed on 11/4. Pt denies chest pain and palpitations on exam.      Low suspicion for seizure activity   Monitor telemetry   Orthostatic vitals positive SBP 170s lying down to 140s upon standing   IVF deferred 2/2 hypertension   Cardiology consulted

## 2019-11-21 NOTE — NURSING
Patient has tylenol listed as a allergy but states she takes it in small doses for headaches. 325mg given

## 2019-11-21 NOTE — ASSESSMENT & PLAN NOTE
-11 second sinus pause on 10/30  -treated with  TVP with resolution; no reversible causes present  -no recurrent pause noted on telemetry overnight  -continue to monitor on telemetry  -continue to avoid BB and CCB (nondihydropyrodine)  -will need 30 day event monitor as an outpatient once discharged from [facility]

## 2019-11-21 NOTE — PLAN OF CARE
Patient is AAO x4. Patient is NSR 70s, no ectopy noted. Neuro checks every 4 hours, slight drfit in RLE and LLE. Patient has stage 1, meplex replaced. Turn patient every 2 hours. Patient has gonzales, strict I/O. Patient has colostomy bag, stoma care daily. Patient denies pain or discomfort. Patient bed alarm is on, bed in the lowest position, and call light within reach.

## 2019-11-21 NOTE — SUBJECTIVE & OBJECTIVE
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    In the setting of septic shock and near death    Petit mal epilepsy 1954    Scoliosis of lumbar spine     Seizures     Unspecified hypothyroidism        Past Surgical History:   Procedure Laterality Date    APPENDECTOMY      BACK SURGERY  1988    vertebral fracture    BACK SURGERY  02/2013    lumbar L2-5    CATARACT EXTRACTION, BILATERAL Bilateral     CHOLECYSTECTOMY      open    EYE SURGERY Bilateral     cataract removal with lens implant    HYSTERECTOMY      PORTACATH PLACEMENT Right 09/2016    RENAL ARTERY STENT Left 07/19/2017    SIGMOIDECTOMY  10/29/2019    SMALL INTESTINE SURGERY  08/23/2016    TONSILLECTOMY      VAGINAL HYSTERECTOMY W/ ANTERIOR AND POSTERIOR VAGINAL REPAIR         Review of patient's allergies indicates:   Allergen Reactions    Adhesive Itching and Blisters    Penicillins Anaphylaxis    Tramadol Hives    Avelox [moxifloxacin] Rash     Facial and arm itching and redness. Pt states throat closes when given.    Amoxil [amoxicillin]     Aspridrox [aspirin, buffered]     Codeine Other (See Comments)     Throat swelling    Keflex [cephalexin]      Tolerated cefepime and cefazolin    Norvasc [amlodipine]     Red dye Hives    Robitussin [guaifenesin]     Sulfa (sulfonamide antibiotics)     Tylenol [acetaminophen]      Has reaction to Tylenol with red dye and unable to take Extra Strength Tylenol/ CAN ONLY TOLERATE REG STRENGTH TYLENOL    Vicks vaporub [camphor-eucalyptus oil-menthol]        No current facility-administered medications on file prior to encounter.      Current Outpatient Medications on File Prior to Encounter   Medication Sig    acetaminophen (TYLENOL) 325 MG tablet Take 2 tablets (650 mg total) by mouth every 4 (four) hours as needed for Pain.    calcium carbonate (OS-RICHARDSON) 600 mg calcium (1,500 mg) Tab Take 0.5 tablets (300 mg total)  by mouth once daily.    carvedilol (COREG) 25 MG tablet Take 1 tablet (25 mg total) by mouth 2 (two) times daily with meals.    denosumab (PROLIA) 60 mg/mL Syrg Inject 60 mg into the skin every 6 (six) months.    levothyroxine (SYNTHROID) 125 MCG tablet TAKE 1 TABLET (125 MCG TOTAL) BY MOUTH ONCE DAILY.    lisinopril (PRINIVIL,ZESTRIL) 40 MG tablet Take 1 tablet (40 mg total) by mouth once daily.    magnesium 250 mg Tab Take 1 tablet (250 mg total) by mouth once daily.    ondansetron (ZOFRAN) 4 MG tablet Take 1 tablet (4 mg total) by mouth every 8 (eight) hours as needed for Nausea.    OXcarbazepine (TRILEPTAL) 300 MG Tab Take 1 tablet (300 mg total) by mouth nightly.    PHENobarbital (LUMINAL) 64.8 MG tablet Take 1 tablet (64.8 mg total) by mouth every evening.    polyethylene glycol (GLYCOLAX) 17 gram PwPk Take 17 g by mouth 2 (two) times daily as needed (Constipation).    pravastatin (PRAVACHOL) 20 MG tablet Take 1 tablet (20 mg total) by mouth once daily.    vitamin D (VITAMIN D3) 1000 units Tab Take 1,000 Units by mouth once daily.    cloNIDine (CATAPRES) 0.2 MG tablet Take 1 tablet (0.2 mg total) by mouth 2 (two) times daily.    oxybutynin (DITROPAN) 5 MG Tab One po every 6 hours as need for bladder irritation    triamcinolone acetonide 0.1% (KENALOG) 0.1 % cream Apply topically 2 (two) times daily. When needed for sores on her head     Family History     Problem Relation (Age of Onset)    Heart attack Father    Hypertension Father    Pancreatic cancer Mother        Tobacco Use    Smoking status: Never Smoker    Smokeless tobacco: Never Used   Substance and Sexual Activity    Alcohol use: No    Drug use: No    Sexual activity: Not on file     Review of Systems   Constitution: Negative for chills, decreased appetite, diaphoresis and fever.   Cardiovascular: Positive for syncope. Negative for chest pain, claudication, cyanosis, dyspnea on exertion, irregular heartbeat, leg swelling,  near-syncope, orthopnea, palpitations and paroxysmal nocturnal dyspnea.   Respiratory: Negative for cough, hemoptysis, shortness of breath and wheezing.    Gastrointestinal: Negative for bloating, abdominal pain, constipation, diarrhea, melena, nausea and vomiting.   Neurological: Positive for dizziness. Negative for weakness.     Objective:     Vital Signs (Most Recent):  Temp: 98.5 °F (36.9 °C) (11/21/19 1147)  Pulse: 76 (11/21/19 1148)  Resp: 16 (11/21/19 1147)  BP: (!) 120/54 (11/21/19 1147)  SpO2: (!) 94 % (11/21/19 1147) Vital Signs (24h Range):  Temp:  [97.4 °F (36.3 °C)-98.5 °F (36.9 °C)] 98.5 °F (36.9 °C)  Pulse:  [71-85] 76  Resp:  [16-27] 16  SpO2:  [90 %-95 %] 94 %  BP: (120-184)/(54-76) 120/54     Weight: 59.6 kg (131 lb 6.3 oz)  Body mass index is 28.43 kg/m².    SpO2: (!) 94 %  O2 Device (Oxygen Therapy): room air      Intake/Output Summary (Last 24 hours) at 11/21/2019 1339  Last data filed at 11/21/2019 0900  Gross per 24 hour   Intake 100 ml   Output 400 ml   Net -300 ml       Lines/Drains/Airways     Central Venous Catheter Line                 Port A Cath Single Lumen right subclavian -- days          Drain                 Colostomy 10/29/19 1200 LLQ 23 days         Urethral Catheter 11/20/19 1405 Latex 16 Fr. less than 1 day                Physical Exam   Constitutional: She is oriented to person, place, and time. She appears well-developed and well-nourished. No distress.   Cardiovascular: Normal rate and regular rhythm. Exam reveals no gallop.   No murmur heard.  Pulmonary/Chest: Effort normal and breath sounds normal. No respiratory distress. She has no wheezes.   Abdominal: Soft. Bowel sounds are normal. She exhibits no distension. There is no tenderness.   Neurological: She is alert and oriented to person, place, and time.   Skin: Skin is warm and dry.       Significant Labs:     Recent Labs   Lab 11/21/19  0214   WBC 4.42   RBC 2.68*   HGB 9.0*   HCT 29.4*      *   MCH 33.6*    MCHC 30.6*     Recent Labs   Lab 11/21/19  0215      K 3.3*   CL 98   CO2 29   BUN 9   CREATININE 0.7       Significant Imaging: Echocardiogram:   Transthoracic echo (TTE) complete (Cupid Only):   Results for orders placed or performed during the hospital encounter of 10/27/19   Echo Color Flow Doppler? Yes   Result Value Ref Range    BSA 1.73 m2    Ascending aorta 2.61 cm    AV mean gradient 3 mmHg    Ao peak gennaro 1.23 m/s    Ao VTI 17.79 cm    IVS 1.07 0.6 - 1.1 cm    LA size 3.21 cm    Left Atrium Major Axis 5.12 cm    Left Atrium Minor Axis 4.83 cm    LVIDD 4.17 3.5 - 6.0 cm    LVIDS 2.93 2.1 - 4.0 cm    LVOT diameter 1.96 cm    LVOT peak VTI 14.74 cm    PW 0.98 0.6 - 1.1 cm    PV Peak D Gennaro 0.24 m/s    PV Peak S Gennaro 0.29 m/s    RA Major Axis 3.96 cm    RA Width 2.15 cm    RVDD 1.57 cm    TR Max Gennaro 2.06 m/s    LA WIDTH 2.65 cm    Ao root annulus 3.38 cm    AORTIC VALVE CUSP SEPERATION 1.48 cm    LV Diastolic Volume 77.15 mL    LV Systolic Volume 33.07 mL    LVOT peak gennaro 0.80 m/s    FS 30 %    LA volume 35.94 cm3    LV mass 140.48 g    Left Ventricle Relative Wall Thickness 0.47 cm    AV valve area 2.50 cm2    AV Velocity Ratio 0.65     AV index (prosthetic) 0.83     Pulm vein S/D ratio 1.21     LVOT area 3.0 cm2    LVOT stroke volume 44.45 cm3    AV peak gradient 6 mmHg    LV Systolic Volume Index 19.7 mL/m2    LV Diastolic Volume Index 45.94 mL/m2    LA Volume Index 21.4 mL/m2    LV Mass Index 84 g/m2    Triscuspid Valve Regurgitation Peak Gradient 17 mmHg    TDI SEPTAL 0.09 m/s    TDI LATERAL 0.16 m/s    Mean e' 0.13 m/s    Narrative    · Normal left ventricular systolic function. The estimated ejection   fraction is 55%  · No wall motion abnormalities.  · Concentric left ventricular remodeling.  · Normal right ventricular systolic function.  · Mild mitral regurgitation.  · Diastolic pattern consistent with atrial fibrillation observed.

## 2019-11-21 NOTE — HOSPITAL COURSE
11/20/2019 Presented to the ER following syncopal episode. Initial troponin .016 remaining labs unremarkable. CT brain negative. No admit EKG available on chart. Admitted to [facility] for further evaluation   11/21/2019 HR 70s-80s overnight. SBP 140s-170s overnight. Orthostatics completed lying HR 77 /74 sitting HR 80 /67 standing HR 85 /65. No bradyarrhythmias or pauses noted on telemetry

## 2019-11-21 NOTE — PROGRESS NOTES
[facility]  Progress Note    Patient Name: Annette Garcia  MRN: [id number]  Patient Class: OP- Observation   Admission Date: 11/20/2019  Length of Stay: 0 days  Attending Physician: Alice Barker*  Primary Care Provider: Jose Valles MD        Subjective:     Principal Problem:Syncope        HPI:  Ms. Annette Garcia is an 83 yo female with a past medical history of peripheral vascular disease, renovascular hypertension, renal artery stenosis status post left renal artery stent placement on 7/19/17, coronary artery disease with history of myocardial infarction, diffuse large B cell lymphoma, anemia, epilepsy, hypothyroidism, chronic hyponatremia, depression, lumbar and sacroiliac joint osteoarthritis with chronic low back pain and history of lumbar spine surgery in 2013, slow transit constipation, with hx of small bowel resection on 8/23/16. Her primary care physician is Dr. Jose Torres.     Recently admitted 10/27-11/12/19 for management of E coli bacteremia. She developed atrial fibrillation and sinus pauses during hospital course. Cardiology was consulted and placed a temporary transvenous pacemaker, which was removed on 11/4.     Patient presented to ED 11/20/19 for brief syncopal episode at [facility] after physical therapy. The staff report that she was unresponsive for a few minutes. There was no seizure like activity. No bowel/bladder incontinence. Patient unable to recall event. SBP reportedly 70s. She denies visual disturbance, confusion, fever, chills, sore throat, SOB, CP, abdominal pain, nausea, vomiting, difficulty urinating, changes in urinary frequency or urgency, back pain, HA, rash, any open wounds, or weakness. Per daughter patient has had similar events in the past.      Labs unremarkable. CT brain negative. CXR  Negative. VSS. Neuro intact. No acute findings on EKG. Patient admitted to [facility].      Overview/Hospital Course:  No events on telemetry overnight. No additional episodes of syncope. Orthostatic vitals positive, fluids deferred secondary to hypertension (-180s). Will consult cardiology for evaluation prior to discharge.      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    In the setting of septic shock and near death    Petit mal epilepsy 1954    Scoliosis of lumbar spine     Seizures     Unspecified hypothyroidism        Past Surgical History:   Procedure Laterality Date    APPENDECTOMY      BACK SURGERY  1988    vertebral fracture    BACK SURGERY  02/2013    lumbar L2-5    CATARACT EXTRACTION, BILATERAL Bilateral     CHOLECYSTECTOMY      open    EYE SURGERY Bilateral     cataract removal with lens implant    HYSTERECTOMY      PORTACATH PLACEMENT Right 09/2016    RENAL ARTERY STENT Left 07/19/2017    SIGMOIDECTOMY  10/29/2019    SMALL INTESTINE SURGERY  08/23/2016    TONSILLECTOMY      VAGINAL HYSTERECTOMY W/ ANTERIOR AND POSTERIOR VAGINAL REPAIR         Review of patient's allergies indicates:   Allergen Reactions    Adhesive Itching and Blisters    Penicillins Anaphylaxis    Tramadol Hives    Avelox [moxifloxacin] Rash     Facial and arm itching and redness. Pt states throat closes when given.    Amoxil [amoxicillin]     Aspridrox [aspirin, buffered]     Codeine Other (See Comments)     Throat swelling    Keflex [cephalexin]      Tolerated cefepime and cefazolin    Norvasc [amlodipine]     Red dye Hives    Robitussin [guaifenesin]     Sulfa (sulfonamide antibiotics)     Tylenol [acetaminophen]      Has reaction to Tylenol with red dye and unable to take Extra Strength Tylenol/ CAN ONLY TOLERATE REG STRENGTH TYLENOL    Vicks vaporub [camphor-eucalyptus oil-menthol]        No current facility-administered medications on file prior to encounter.      Current Outpatient Medications on File  Prior to Encounter   Medication Sig    acetaminophen (TYLENOL) 325 MG tablet Take 2 tablets (650 mg total) by mouth every 4 (four) hours as needed for Pain.    calcium carbonate (OS-RICHARDSON) 600 mg calcium (1,500 mg) Tab Take 0.5 tablets (300 mg total) by mouth once daily.    carvedilol (COREG) 25 MG tablet Take 1 tablet (25 mg total) by mouth 2 (two) times daily with meals.    denosumab (PROLIA) 60 mg/mL Syrg Inject 60 mg into the skin every 6 (six) months.    levothyroxine (SYNTHROID) 125 MCG tablet TAKE 1 TABLET (125 MCG TOTAL) BY MOUTH ONCE DAILY.    lisinopril (PRINIVIL,ZESTRIL) 40 MG tablet Take 1 tablet (40 mg total) by mouth once daily.    magnesium 250 mg Tab Take 1 tablet (250 mg total) by mouth once daily.    ondansetron (ZOFRAN) 4 MG tablet Take 1 tablet (4 mg total) by mouth every 8 (eight) hours as needed for Nausea.    OXcarbazepine (TRILEPTAL) 300 MG Tab Take 1 tablet (300 mg total) by mouth nightly.    PHENobarbital (LUMINAL) 64.8 MG tablet Take 1 tablet (64.8 mg total) by mouth every evening.    polyethylene glycol (GLYCOLAX) 17 gram PwPk Take 17 g by mouth 2 (two) times daily as needed (Constipation).    pravastatin (PRAVACHOL) 20 MG tablet Take 1 tablet (20 mg total) by mouth once daily.    vitamin D (VITAMIN D3) 1000 units Tab Take 1,000 Units by mouth once daily.    cloNIDine (CATAPRES) 0.2 MG tablet Take 1 tablet (0.2 mg total) by mouth 2 (two) times daily.    oxybutynin (DITROPAN) 5 MG Tab One po every 6 hours as need for bladder irritation    triamcinolone acetonide 0.1% (KENALOG) 0.1 % cream Apply topically 2 (two) times daily. When needed for sores on her head     Family History     Problem Relation (Age of Onset)    Heart attack Father    Hypertension Father    Pancreatic cancer Mother        Tobacco Use    Smoking status: Never Smoker    Smokeless tobacco: Never Used   Substance and Sexual Activity    Alcohol use: No    Drug use: No    Sexual activity: Not on file      Review of Systems   Constitutional: Negative for chills, diaphoresis and fever.   Eyes: Negative for photophobia.   Respiratory: Negative for cough, chest tightness, shortness of breath and wheezing.    Cardiovascular: Negative for chest pain, palpitations and leg swelling.   Gastrointestinal: Negative for abdominal pain, diarrhea, nausea and vomiting.   Genitourinary: Negative for dysuria, flank pain, frequency and hematuria.   Musculoskeletal: Negative for back pain and myalgias.   Neurological: Negative for dizziness, syncope, light-headedness and headaches.   Psychiatric/Behavioral: Negative for confusion.     Objective:     Vital Signs (Most Recent):  Temp: 98.8 °F (37.1 °C) (11/21/19 1624)  Pulse: 77 (11/21/19 1624)  Resp: 19 (11/21/19 1624)  BP: (!) 165/73 (11/21/19 1624)  SpO2: (!) 93 % (11/21/19 1624) Vital Signs (24h Range):  Temp:  [97.4 °F (36.3 °C)-98.8 °F (37.1 °C)] 98.8 °F (37.1 °C)  Pulse:  [71-85] 77  Resp:  [16-27] 19  SpO2:  [90 %-95 %] 93 %  BP: (120-184)/(54-76) 165/73     Weight: 59.6 kg (131 lb 6.3 oz)  Body mass index is 28.43 kg/m².    Physical Exam   Constitutional: She is oriented to person, place, and time. She appears well-developed and well-nourished.   Frail    HENT:   Head: Normocephalic and atraumatic.   Eyes: Pupils are equal, round, and reactive to light. Conjunctivae are normal.   Neck: Normal range of motion. No JVD present.   Cardiovascular: Normal rate, regular rhythm, normal heart sounds and intact distal pulses.   Pulmonary/Chest: Effort normal. No respiratory distress. She has no wheezes.   Abdominal: Soft. Bowel sounds are normal. She exhibits no distension. There is no tenderness. There is no guarding.   Musculoskeletal: Normal range of motion. She exhibits no edema or tenderness.   Neurological: She is alert and oriented to person, place, and time. No cranial nerve deficit.   Skin: Skin is warm and dry. Capillary refill takes less than 2 seconds.   Psychiatric: She  has a normal mood and affect. Her behavior is normal.         CRANIAL NERVES     CN III, IV, VI   Pupils are equal, round, and reactive to light.       Significant Labs:   BMP:   Recent Labs   Lab 11/21/19  0215   GLU 93      K 3.3*   CL 98   CO2 29   BUN 9   CREATININE 0.7   CALCIUM 8.2*     CBC:   Recent Labs   Lab 11/20/19  1437 11/21/19  0214   WBC 5.45 4.42   HGB 9.2* 9.0*   HCT 29.6* 29.4*    222       Significant Imaging: I have reviewed all pertinent imaging results/findings within the past 24 hours.      Assessment/Plan:      * Syncope  Etiology unclear, neuro intact on exam, no acute findings on EKG. Patient with recent hospitalized 10/27-11/12/19. She developed atrial fibrillation with atrial pauses requiring temporary transvenous pacemaker, which was removed on 11/4. Pt denies chest pain and palpitations on exam.      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       Anemia due to antineoplastic chemotherapy  H/H stable, monitor       Acquired hypothyroidism  Continue synthroid 125 mcg daily       Epilepsy  Continue phenobarbital and oxcarbazepine   Seizure precautions       Renovascular hypertension  Peripheral vascular disease, unspecified    Taking pravastatin, carvedilol, clonidine and lisinopril               VTE Risk Mitigation (From admission, onward)         Ordered     IP VTE HIGH RISK PATIENT  Once      11/20/19 2235     Place sequential compression device  Until discontinued      11/20/19 2235                      Zeinab Tejada NP  Department of Hospital Medicine   [facility]

## 2019-11-21 NOTE — CONSULTS
[facility]  Cardiology  Consult Note    Patient Name: Annette Garcia  MRN: [id number]  Admission Date: 11/20/2019  Hospital Length of Stay: 0 days  Code Status: Full Code   Attending Provider: Alice Barker*   Consulting Provider: SERGEY Mena ANP  Primary Care Physician: Jose Valles MD  Principal Problem:Syncope    Patient information was obtained from patient, relative(s) and ER records.     Inpatient consult to [facility]  Consult performed by: SERGEY Grier, BALBIR  Consult ordered by: Zeinab Tejada NP        Subjective:     Chief Complaint:  Syncope      HPI:   85yo female with history of renovascular HTN, sinus pause 10/31 treated with TVP with resolution, B cell lymphoma, PAD s/p left renal stent, anemia secondary to chemotherapy and hypothyroidism who presented to the ER with complaints of syncope. Ms. Garcia is familiar to our service from admission last month with sinus pause/asystole treated with TVP during acute infectious etiology. She is accompanied by her family who was not present at the time of the syncopal episodes. She does not recall the event therefore her HPI was obtained from admit H&P. She was discharge to [facility] with reports from staff of unresponsiveness  for a few minutes. There were no reports of seizure like activity, bowel/bladder incontinence. She recalls being in PT and then the next thing she remembers is being in the room at the rehab center. She denies any complaints of chest pain or SOB in previous days. .        Hospital Course:    11/20/2019 Presented to the ER following syncopal episode. Initial troponin .016 remaining labs unremarkable. CT brain negative. No admit EKG available on chart. Admitted to [facility] for further evaluation   11/21/2019 HR 70s-80s overnight. SBP 140s-170s overnight. Orthostatics completed lying HR 77 /74 sitting HR 80 /67 standing HR 85 /65. 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    In the setting of septic shock and near death    Petit mal epilepsy 1954    Scoliosis of lumbar spine     Seizures     Unspecified hypothyroidism        Past Surgical History:   Procedure Laterality Date    APPENDECTOMY      BACK SURGERY  1988    vertebral fracture    BACK SURGERY  02/2013    lumbar L2-5    CATARACT EXTRACTION, BILATERAL Bilateral     CHOLECYSTECTOMY      open    EYE SURGERY Bilateral     cataract removal with lens implant    HYSTERECTOMY      PORTACATH PLACEMENT Right 09/2016    RENAL ARTERY STENT Left 07/19/2017    SIGMOIDECTOMY  10/29/2019    SMALL INTESTINE SURGERY  08/23/2016    TONSILLECTOMY      VAGINAL HYSTERECTOMY W/ ANTERIOR AND POSTERIOR VAGINAL REPAIR         Review of patient's allergies indicates:   Allergen Reactions    Adhesive Itching and Blisters    Penicillins Anaphylaxis    Tramadol Hives    Avelox [moxifloxacin] Rash     Facial and arm itching and redness. Pt states throat closes when given.    Amoxil [amoxicillin]     Aspridrox [aspirin, buffered]     Codeine Other (See Comments)     Throat swelling    Keflex [cephalexin]      Tolerated cefepime and cefazolin    Norvasc [amlodipine]     Red dye Hives    Robitussin [guaifenesin]     Sulfa (sulfonamide antibiotics)     Tylenol [acetaminophen]      Has reaction to Tylenol with red dye and unable to take Extra Strength Tylenol/ CAN ONLY TOLERATE REG STRENGTH TYLENOL    Vicks vaporub [camphor-eucalyptus oil-menthol]        No current facility-administered medications on file prior to encounter.      Current Outpatient Medications on File Prior to Encounter   Medication Sig    acetaminophen (TYLENOL) 325 MG tablet Take 2 tablets (650 mg total) by mouth every 4 (four) hours as needed for Pain.    calcium carbonate (OS-RICHARDSON) 600 mg  calcium (1,500 mg) Tab Take 0.5 tablets (300 mg total) by mouth once daily.    carvedilol (COREG) 25 MG tablet Take 1 tablet (25 mg total) by mouth 2 (two) times daily with meals.    denosumab (PROLIA) 60 mg/mL Syrg Inject 60 mg into the skin every 6 (six) months.    levothyroxine (SYNTHROID) 125 MCG tablet TAKE 1 TABLET (125 MCG TOTAL) BY MOUTH ONCE DAILY.    lisinopril (PRINIVIL,ZESTRIL) 40 MG tablet Take 1 tablet (40 mg total) by mouth once daily.    magnesium 250 mg Tab Take 1 tablet (250 mg total) by mouth once daily.    ondansetron (ZOFRAN) 4 MG tablet Take 1 tablet (4 mg total) by mouth every 8 (eight) hours as needed for Nausea.    OXcarbazepine (TRILEPTAL) 300 MG Tab Take 1 tablet (300 mg total) by mouth nightly.    PHENobarbital (LUMINAL) 64.8 MG tablet Take 1 tablet (64.8 mg total) by mouth every evening.    polyethylene glycol (GLYCOLAX) 17 gram PwPk Take 17 g by mouth 2 (two) times daily as needed (Constipation).    pravastatin (PRAVACHOL) 20 MG tablet Take 1 tablet (20 mg total) by mouth once daily.    vitamin D (VITAMIN D3) 1000 units Tab Take 1,000 Units by mouth once daily.    cloNIDine (CATAPRES) 0.2 MG tablet Take 1 tablet (0.2 mg total) by mouth 2 (two) times daily.    oxybutynin (DITROPAN) 5 MG Tab One po every 6 hours as need for bladder irritation    triamcinolone acetonide 0.1% (KENALOG) 0.1 % cream Apply topically 2 (two) times daily. When needed for sores on her head     Family History     Problem Relation (Age of Onset)    Heart attack Father    Hypertension Father    Pancreatic cancer Mother        Tobacco Use    Smoking status: Never Smoker    Smokeless tobacco: Never Used   Substance and Sexual Activity    Alcohol use: No    Drug use: No    Sexual activity: Not on file     Review of Systems   Constitution: Negative for chills, decreased appetite, diaphoresis and fever.   Cardiovascular: Positive for syncope. Negative for chest pain, claudication, cyanosis, dyspnea on  exertion, irregular heartbeat, leg swelling, near-syncope, orthopnea, palpitations and paroxysmal nocturnal dyspnea.   Respiratory: Negative for cough, hemoptysis, shortness of breath and wheezing.    Gastrointestinal: Negative for bloating, abdominal pain, constipation, diarrhea, melena, nausea and vomiting.   Neurological: Positive for dizziness. Negative for weakness.     Objective:     Vital Signs (Most Recent):  Temp: 98.5 °F (36.9 °C) (11/21/19 1147)  Pulse: 76 (11/21/19 1148)  Resp: 16 (11/21/19 1147)  BP: (!) 120/54 (11/21/19 1147)  SpO2: (!) 94 % (11/21/19 1147) Vital Signs (24h Range):  Temp:  [97.4 °F (36.3 °C)-98.5 °F (36.9 °C)] 98.5 °F (36.9 °C)  Pulse:  [71-85] 76  Resp:  [16-27] 16  SpO2:  [90 %-95 %] 94 %  BP: (120-184)/(54-76) 120/54     Weight: 59.6 kg (131 lb 6.3 oz)  Body mass index is 28.43 kg/m².    SpO2: (!) 94 %  O2 Device (Oxygen Therapy): room air      Intake/Output Summary (Last 24 hours) at 11/21/2019 1339  Last data filed at 11/21/2019 0900  Gross per 24 hour   Intake 100 ml   Output 400 ml   Net -300 ml       Lines/Drains/Airways     Central Venous Catheter Line                 Port A Cath Single Lumen right subclavian -- days          Drain                 Colostomy 10/29/19 1200 LLQ 23 days         Urethral Catheter 11/20/19 1405 Latex 16 Fr. less than 1 day                Physical Exam   Constitutional: She is oriented to person, place, and time. She appears well-developed and well-nourished. No distress.   Cardiovascular: Normal rate and regular rhythm. Exam reveals no gallop.   No murmur heard.  Pulmonary/Chest: Effort normal and breath sounds normal. No respiratory distress. She has no wheezes.   Abdominal: Soft. Bowel sounds are normal. She exhibits no distension. There is no tenderness.   Neurological: She is alert and oriented to person, place, and time.   Skin: Skin is warm and dry.       Significant Labs:     Recent Labs   Lab 11/21/19  0214   WBC 4.42   RBC 2.68*   HGB 9.0*    HCT 29.4*      *   MCH 33.6*   MCHC 30.6*     Recent Labs   Lab 11/21/19  0215      K 3.3*   CL 98   CO2 29   BUN 9   CREATININE 0.7       Significant Imaging: Echocardiogram:   Transthoracic echo (TTE) complete (Cupid Only):   Results for orders placed or performed during the hospital encounter of 10/27/19   Echo Color Flow Doppler? Yes   Result Value Ref Range    BSA 1.73 m2    Ascending aorta 2.61 cm    AV mean gradient 3 mmHg    Ao peak gennaro 1.23 m/s    Ao VTI 17.79 cm    IVS 1.07 0.6 - 1.1 cm    LA size 3.21 cm    Left Atrium Major Axis 5.12 cm    Left Atrium Minor Axis 4.83 cm    LVIDD 4.17 3.5 - 6.0 cm    LVIDS 2.93 2.1 - 4.0 cm    LVOT diameter 1.96 cm    LVOT peak VTI 14.74 cm    PW 0.98 0.6 - 1.1 cm    PV Peak D Gennaro 0.24 m/s    PV Peak S Gennaro 0.29 m/s    RA Major Axis 3.96 cm    RA Width 2.15 cm    RVDD 1.57 cm    TR Max Gennaro 2.06 m/s    LA WIDTH 2.65 cm    Ao root annulus 3.38 cm    AORTIC VALVE CUSP SEPERATION 1.48 cm    LV Diastolic Volume 77.15 mL    LV Systolic Volume 33.07 mL    LVOT peak gennaro 0.80 m/s    FS 30 %    LA volume 35.94 cm3    LV mass 140.48 g    Left Ventricle Relative Wall Thickness 0.47 cm    AV valve area 2.50 cm2    AV Velocity Ratio 0.65     AV index (prosthetic) 0.83     Pulm vein S/D ratio 1.21     LVOT area 3.0 cm2    LVOT stroke volume 44.45 cm3    AV peak gradient 6 mmHg    LV Systolic Volume Index 19.7 mL/m2    LV Diastolic Volume Index 45.94 mL/m2    LA Volume Index 21.4 mL/m2    LV Mass Index 84 g/m2    Triscuspid Valve Regurgitation Peak Gradient 17 mmHg    TDI SEPTAL 0.09 m/s    TDI LATERAL 0.16 m/s    Mean e' 0.13 m/s    Narrative    · Normal left ventricular systolic function. The estimated ejection   fraction is 55%  · No wall motion abnormalities.  · Concentric left ventricular remodeling.  · Normal right ventricular systolic function.  · Mild mitral regurgitation.  · Diastolic pattern consistent with atrial fibrillation observed.        Assessment  and Plan:     * Syncope  -reported syncope per rehab staff  -patient does not recall event  -denies any chest pain or SOB prior to event  -orthostatics positive; recommend IVF if orthostatics remain positive  -etiology likely orthostatic vs recurrent pause vs other non cardiac etiology; non cardiac etiology less likely  -continue to monitor on telemetry; recommend discontinuation of BB  -will need cardiac event monitor as an outpatient and will likely need to be deferred until discharged from rehab    Sinus pause  -11 second sinus pause on 10/30  -treated with  TVP with resolution; no reversible causes present  -no recurrent pause noted on telemetry overnight  -continue to monitor on telemetry  -continue to avoid BB and CCB (nondihydropyrodine)  -will need 30 day event monitor as an outpatient once discharged from [facility]                     Renovascular hypertension  -BP 140s-170s/60s-70s  -on Coreg, Clonidine and Lisinopril at home  -continue to hold Coreg  -orthostatics positive; recommend IVF if remains positive  -titrate ACEI as BP tolerates         VTE Risk Mitigation (From admission, onward)         Ordered     IP VTE HIGH RISK PATIENT  Once      11/20/19 2235     Place sequential compression device  Until discontinued      11/20/19 2235                Thank you for your consult. I will follow-up with patient. Please contact us if you have any additional questions.    SERGEY Mena, ANP  Cardiology   [facility]

## 2019-11-21 NOTE — PLAN OF CARE
Problem: Occupational Therapy Goal  Goal: Occupational Therapy Goal  Description  Goals to be met by: 12/21/2019     Patient will increase functional independence with ADLs by performing:      UE Dressing with Stand-by Assistance.  LE Dressing with Minimal Assistance.  Grooming while seated with Set-up Assistance.  Sitting at edge of bed x15 minutes with Supervision.  Rolling to Bilateral with Modified [place].   Stand pivot transfers with Stand-by Assistance.  Step transfer with Contact Guard Assistance  Toilet transfer to bedside commode with Contact Guard Assistance.  Increased functional strength to WFL for self care.  Upper extremity exercise program x10 reps per handout, with assistance as needed.   Outcome: Ongoing, Progressing   Pt would benefit from continued OT to address deficits in self care and functional mobility. Recommending return to SNF; DME needs likely none

## 2019-11-21 NOTE — HPI
83yo female with history of renovascular HTN, sinus pause 10/31 treated with TVP with resolution, B cell lymphoma, PAD s/p left renal stent, anemia secondary to chemotherapy and hypothyroidism who presented to the ER with complaints of syncope. Ms. Garcia is familiar to our service from admission last month with sinus pause/asystole treated with TVP during acute infectious etiology. She is accompanied by her family who was not present at the time of the syncopal episodes. She does not recall the event therefore her HPI was obtained from admit H&P. She was discharge to [facility] with reports from staff of unresponsiveness  for a few minutes. There were no reports of seizure like activity, bowel/bladder incontinence. She recalls being in PT and then the next thing she remembers is being in the room at the rehab center. She denies any complaints of chest pain or SOB in previous days. .

## 2019-11-21 NOTE — PLAN OF CARE
Problem: Adult Inpatient Plan of Care  Goal: Plan of Care Review    Patient is awake and orientedx4. Care plan explained to patient; she verbalized understanding. On room air, O2 saturation at 93%. Hooked to heart monitor running normal sinus rhythm at 73-85bpm. Martinez in place, draining dark yellow urine. No n/v/d during shift. Patient had a headache, 325mg of tylenol given. Due medications given. Neuro checks every 4 hours. Seizure precaution initiated. LLQ colostomy intact. Encouraged to turn every 2 hours as tolerated. Maintained on fall risk precaution. Bed in lowest position, bed alarm on, call light/personal items within reach and instructed to call for help when needed. Will continue to monitor.    Orthostatics completed: BP lyin/74, sittin/67, Standin/65. Notified ERIC Mayo NP.    Outcome: Ongoing, Progressing

## 2019-11-22 VITALS
OXYGEN SATURATION: 93 % | TEMPERATURE: 98 F | DIASTOLIC BLOOD PRESSURE: 68 MMHG | HEIGHT: 57 IN | RESPIRATION RATE: 20 BRPM | BODY MASS INDEX: 28.59 KG/M2 | HEART RATE: 80 BPM | WEIGHT: 132.5 LBS | SYSTOLIC BLOOD PRESSURE: 163 MMHG

## 2019-11-22 PROBLEM — Z71.89 GOALS OF CARE, COUNSELING/DISCUSSION: Status: ACTIVE | Noted: 2019-11-22

## 2019-11-22 PROBLEM — Z51.5 PALLIATIVE CARE ENCOUNTER: Status: ACTIVE | Noted: 2019-11-22

## 2019-11-22 PROBLEM — Z71.89 COUNSELING REGARDING ADVANCE CARE PLANNING AND GOALS OF CARE: Status: ACTIVE | Noted: 2019-11-22

## 2019-11-22 LAB
ANION GAP SERPL CALC-SCNC: 9 MMOL/L (ref 8–16)
BASOPHILS # BLD AUTO: 0.02 K/UL (ref 0–0.2)
BASOPHILS NFR BLD: 0.5 % (ref 0–1.9)
BUN SERPL-MCNC: 8 MG/DL (ref 8–23)
CALCIUM SERPL-MCNC: 8.1 MG/DL (ref 8.7–10.5)
CHLORIDE SERPL-SCNC: 96 MMOL/L (ref 95–110)
CO2 SERPL-SCNC: 32 MMOL/L (ref 23–29)
CREAT SERPL-MCNC: 0.6 MG/DL (ref 0.5–1.4)
DIFFERENTIAL METHOD: ABNORMAL
EOSINOPHIL # BLD AUTO: 0.2 K/UL (ref 0–0.5)
EOSINOPHIL NFR BLD: 4.4 % (ref 0–8)
ERYTHROCYTE [DISTWIDTH] IN BLOOD BY AUTOMATED COUNT: 18.9 % (ref 11.5–14.5)
EST. GFR  (AFRICAN AMERICAN): >60 ML/MIN/1.73 M^2
EST. GFR  (NON AFRICAN AMERICAN): >60 ML/MIN/1.73 M^2
GLUCOSE SERPL-MCNC: 85 MG/DL (ref 70–110)
HCT VFR BLD AUTO: 30 % (ref 37–48.5)
HGB BLD-MCNC: 9.3 G/DL (ref 12–16)
LYMPHOCYTES # BLD AUTO: 1.5 K/UL (ref 1–4.8)
LYMPHOCYTES NFR BLD: 34 % (ref 18–48)
MAGNESIUM SERPL-MCNC: 1.3 MG/DL (ref 1.6–2.6)
MCH RBC QN AUTO: 33.7 PG (ref 27–31)
MCHC RBC AUTO-ENTMCNC: 31 G/DL (ref 32–36)
MCV RBC AUTO: 109 FL (ref 82–98)
MONOCYTES # BLD AUTO: 0.6 K/UL (ref 0.3–1)
MONOCYTES NFR BLD: 14.5 % (ref 4–15)
NEUTROPHILS # BLD AUTO: 2 K/UL (ref 1.8–7.7)
NEUTROPHILS NFR BLD: 46.6 % (ref 38–73)
PHOSPHATE SERPL-MCNC: 2.9 MG/DL (ref 2.7–4.5)
PLATELET # BLD AUTO: 239 K/UL (ref 150–350)
PMV BLD AUTO: 9.3 FL (ref 9.2–12.9)
POTASSIUM SERPL-SCNC: 3.5 MMOL/L (ref 3.5–5.1)
RBC # BLD AUTO: 2.76 M/UL (ref 4–5.4)
SODIUM SERPL-SCNC: 137 MMOL/L (ref 136–145)
WBC # BLD AUTO: 4.35 K/UL (ref 3.9–12.7)

## 2019-11-22 PROCEDURE — 99215 OFFICE O/P EST HI 40 MIN: CPT | Mod: ,,, | Performed by: NURSE PRACTITIONER

## 2019-11-22 PROCEDURE — 94761 N-INVAS EAR/PLS OXIMETRY MLT: CPT

## 2019-11-22 PROCEDURE — 80048 BASIC METABOLIC PNL TOTAL CA: CPT

## 2019-11-22 PROCEDURE — 97110 THERAPEUTIC EXERCISES: CPT

## 2019-11-22 PROCEDURE — 25000003 PHARM REV CODE 250: Performed by: NURSE PRACTITIONER

## 2019-11-22 PROCEDURE — 85025 COMPLETE CBC W/AUTO DIFF WBC: CPT

## 2019-11-22 PROCEDURE — 99215 PR OFFICE/OUTPT VISIT, EST, LEVL V, 40-54 MIN: ICD-10-PCS | Mod: ,,, | Performed by: NURSE PRACTITIONER

## 2019-11-22 PROCEDURE — 84100 ASSAY OF PHOSPHORUS: CPT

## 2019-11-22 PROCEDURE — 92523 SPEECH SOUND LANG COMPREHEN: CPT

## 2019-11-22 PROCEDURE — G0378 HOSPITAL OBSERVATION PER HR: HCPCS

## 2019-11-22 PROCEDURE — 83735 ASSAY OF MAGNESIUM: CPT

## 2019-11-22 PROCEDURE — 97535 SELF CARE MNGMENT TRAINING: CPT

## 2019-11-22 RX ORDER — LISINOPRIL 40 MG/1
40 TABLET ORAL DAILY
Qty: 90 TABLET | Refills: 3
Start: 2019-11-22 | End: 2019-12-23

## 2019-11-22 RX ORDER — LISINOPRIL 40 MG/1
20 TABLET ORAL DAILY
Qty: 90 TABLET | Refills: 3
Start: 2019-11-22 | End: 2019-11-22 | Stop reason: SDUPTHER

## 2019-11-22 RX ORDER — LISINOPRIL 20 MG/1
20 TABLET ORAL DAILY
Status: DISCONTINUED | OUTPATIENT
Start: 2019-11-22 | End: 2019-11-22 | Stop reason: HOSPADM

## 2019-11-22 RX ADMIN — PRAVASTATIN SODIUM 20 MG: 10 TABLET ORAL at 09:11

## 2019-11-22 RX ADMIN — CLONIDINE HYDROCHLORIDE 0.2 MG: 0.2 TABLET ORAL at 09:11

## 2019-11-22 RX ADMIN — VITAMIN D, TAB 1000IU (100/BT) 1000 UNITS: 25 TAB at 09:11

## 2019-11-22 RX ADMIN — LEVOTHYROXINE SODIUM 125 MCG: 75 TABLET ORAL at 09:11

## 2019-11-22 RX ADMIN — Medication 400 MG: at 11:11

## 2019-11-22 RX ADMIN — LISINOPRIL 20 MG: 20 TABLET ORAL at 09:11

## 2019-11-22 NOTE — PLAN OF CARE
Problem: Occupational Therapy Goal  Goal: Occupational Therapy Goal  Description  Goals to be met by: 12/21/2019     Patient will increase functional independence with ADLs by performing:      UE Dressing with Stand-by Assistance.  LE Dressing with Minimal Assistance.  Grooming while seated with Set-up Assistance.  Sitting at edge of bed x15 minutes with Supervision.  Rolling to Bilateral with Modified [place].   Stand pivot transfers with Stand-by Assistance.  Step transfer with Contact Guard Assistance  Toilet transfer to bedside commode with Contact Guard Assistance.  Increased functional strength to WFL for self care.  Upper extremity exercise program x10 reps per handout, with assistance as needed.   Outcome: Ongoing, Progressing    Annette Garcia is a 84 y.o. female with a medical diagnosis of Syncope.  Performance deficits affecting function are weakness, impaired endurance, impaired self care skills, impaired functional mobilty, gait instability, impaired balance, decreased upper extremity function, decreased lower extremity function, impaired cardiopulmonary response to activity.  Pt found in bed, agreeable to therapy.  Pt with improved performance with bed mobility and sit<>stand this pm.  Progressing towards goals. Continue OT services to address functional goals, progressing as able.  FESTUS Saleh/L

## 2019-11-22 NOTE — DISCHARGE SUMMARY
[facility] Medicine  Discharge Summary      Patient Name: Annette Garcia  MRN: [id number]  Admission Date: 11/20/2019  Hospital Length of Stay: 0 days  Discharge Date and Time: 11/22/2019  3:45 PM  Attending Physician: Alice Barker*   Discharging Provider: Alice Barker MD  Primary Care Provider: Jose Valles MD      HPI:   Ms. Annette Garcia is an 85 yo female with a past medical history of peripheral vascular disease, renovascular hypertension, renal artery stenosis status post left renal artery stent placement on 7/19/17, coronary artery disease with history of myocardial infarction, diffuse large B cell lymphoma, anemia, epilepsy, hypothyroidism, chronic hyponatremia, depression, lumbar and sacroiliac joint osteoarthritis with chronic low back pain and history of lumbar spine surgery in 2013, slow transit constipation, with hx of small bowel resection on 8/23/16. Her primary care physician is Dr. Jose Torres.     Recently admitted 10/27-11/12/19 for management of E coli bacteremia. She developed atrial fibrillation and sinus pauses during hospital course. Cardiology was consulted and placed a temporary transvenous pacemaker, which was removed on 11/4.     Patient presented to ED 11/20/19 for brief syncopal episode at [facility] after physical therapy. The staff report that she was unresponsive for a few minutes. There was no seizure like activity. No bowel/bladder incontinence. Patient unable to recall event. SBP reportedly 70s. She denies visual disturbance, confusion, fever, chills, sore throat, SOB, CP, abdominal pain, nausea, vomiting, difficulty urinating, changes in urinary frequency or urgency, back pain, HA, rash, any open wounds, or weakness. Per daughter patient has had similar events in the past.      Labs unremarkable. CT brain negative. CXR  Negative. VSS. Neuro intact. No acute findings on EKG. Patient admitted to [facility].     * No surgery found *      Hospital Course:   No events on telemetry overnight. No additional episodes of syncope. Orthostatic vitals positive, fluids deferred secondary to hypertension (-180s). Will consult cardiology for evaluation prior to discharge.    Appreciates card's rec; d/c BB and FU outpatient with cards for possible Holter Monitor     Consults:   Consults (From admission, onward)        Status Ordering Provider     Inpatient consult to [facility]  Once     Provider:  (Not yet assigned)    Completed ELIA PAULINO     Inpatient consult to Palliative Care  Once     Provider:  (Not yet assigned)    Acknowledged ELIA PAULINO     Inpatient consult to Palliative Care  Once     Provider:  (Not yet assigned)    Ordered INNOCENT-ITUAH, PAULA N.          * Syncope  Etiology unclear, neuro intact on exam, no acute findings on EKG. Patient with recent hospitalized 10/27-11/12/19. She developed atrial fibrillation with atrial pauses requiring temporary transvenous pacemaker, which was removed on 11/4. Pt denies chest pain and palpitations on exam.      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       Anemia due to antineoplastic chemotherapy  H/H stable, monitor       Acquired hypothyroidism  Continue synthroid 125 mcg daily       Epilepsy  Continue phenobarbital and oxcarbazepine   Seizure precautions       Renovascular hypertension  Peripheral vascular disease, unspecified    Taking pravastatin, carvedilol, clonidine and lisinopril   D/c Carvediol and Add norvasc              Final Active Diagnoses:    Diagnosis Date Noted POA    PRINCIPAL PROBLEM:  Syncope [R55] 11/20/2019 Yes    Palliative care encounter [Z51.5] 11/22/2019 Not Applicable     Counseling regarding advance care planning and goals of care [Z71.89] 11/22/2019 Not Applicable    Goals of care, counseling/discussion [Z71.89] 11/22/2019 Not Applicable    Sinus pause [I45.5] 10/30/2019 Yes    Peripheral vascular disease, unspecified [I73.9] 10/28/2019 Yes     Chronic    Colon necrosis [K55.049] 10/27/2019 Yes    Depression [F32.9] 10/17/2019 Yes    Chronic bilateral low back pain without sciatica [M54.5, G89.29] 10/05/2018 Yes    Bilateral renal artery stenosis [I70.1] 01/16/2017 Yes    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, T45.1X5A] 12/27/2016 Yes     Chronic    Acquired hypothyroidism [E03.9] 08/18/2016 Yes     Chronic    Epilepsy [G40.909] 08/18/2016 Yes     Chronic    Renovascular hypertension [I15.0] 08/18/2016 Yes     Chronic      Problems Resolved During this Admission:       Discharged Condition: stable    Disposition:     Follow Up:  Follow-up Information     Jose Valles MD In 3 weeks.    Specialty:  Family Medicine  Contact information:  [address] [place] [address] [id number] [phone]             Charles Sargent MD In 3 weeks.    Specialties:  INTERVENTIONAL CARDIOLOGY, Cardiology  Contact information:  [address]  [address] [address] [phone]                 Patient Instructions:   No discharge procedures on file.    Significant Diagnostic Studies:     Pending Diagnostic Studies:     None         Medications:  Reconciled Home Medications:      Medication List      START taking these medications    lisinopril 40 MG tablet  Commonly known as:  PRINIVIL,ZESTRIL  Take 1 tablet (40 mg total) by mouth once daily.        CONTINUE taking these medications    acetaminophen 325 MG tablet  Commonly known as:  TYLENOL  Take 2 tablets (650 mg total) by mouth every 4 (four) hours as needed for Pain.     calcium carbonate 600 mg calcium (1,500 mg) Tab  Commonly known as:  OS-RICHARDSON  Take 0.5 tablets (300 mg total) by mouth once daily.     cloNIDine 0.2 MG  tablet  Commonly known as:  CATAPRES  Take 1 tablet (0.2 mg total) by mouth 2 (two) times daily.     levothyroxine 125 MCG tablet  Commonly known as:  SYNTHROID  TAKE 1 TABLET (125 MCG TOTAL) BY MOUTH ONCE DAILY.     magnesium 250 mg Tab  Take 1 tablet (250 mg total) by mouth once daily.     ondansetron 4 MG tablet  Commonly known as:  ZOFRAN  Take 1 tablet (4 mg total) by mouth every 8 (eight) hours as needed for Nausea.     OXcarbazepine 300 MG Tab  Commonly known as:  TRILEPTAL  Take 1 tablet (300 mg total) by mouth nightly.     PHENobarbital 64.8 MG tablet  Commonly known as:  LUMINAL  Take 1 tablet (64.8 mg total) by mouth every evening.     polyethylene glycol 17 gram Pwpk  Commonly known as:  GLYCOLAX  Take 17 g by mouth 2 (two) times daily as needed (Constipation).     pravastatin 20 MG tablet  Commonly known as:  PRAVACHOL  Take 1 tablet (20 mg total) by mouth once daily.     vitamin D 1000 units Tab  Commonly known as:  VITAMIN D3  Take 1,000 Units by mouth once daily.        STOP taking these medications    carvedilol 25 MG tablet  Commonly known as:  COREG     oxybutynin 5 MG Tab  Commonly known as:  DITROPAN     Prolia 60 mg/mL Syrg  Generic drug:  denosumab     triamcinolone acetonide 0.1% 0.1 % cream  Commonly known as:  KENALOG            Indwelling Lines/Drains at time of discharge:   Lines/Drains/Airways     Central Venous Catheter Line                 Port A Cath Single Lumen right subclavian -- days          Drain                 Colostomy 10/29/19 1200 LLQ 24 days         Urethral Catheter 11/20/19 1405 Latex 16 Fr. 1 day          Pressure Ulcer                 Pressure Injury 11/20/19 1300 Coccyx Stage 1 2 days                Time spent on the discharge of patient: 45 minutes  Patient was seen and examined on the date of discharge and determined to be suitable for discharge.         Alice Barker MD  Department of Hospital Medicine  [facility]

## 2019-11-22 NOTE — SUBJECTIVE & OBJECTIVE
Interval History: awake and alert. No new complaint. Appreciates cards rec's no BB, can fu card as outpatient for a 30 possible Holter monitor      Review of Systems   Constitutional: Negative for chills, diaphoresis and fever.   Respiratory: Negative for cough and shortness of breath.    Cardiovascular: Negative for chest pain, palpitations and leg swelling.   Gastrointestinal: Negative for abdominal pain, nausea and vomiting.   Genitourinary: Negative for dysuria, flank pain, frequency and hematuria.   Musculoskeletal: Negative for back pain and myalgias.   Neurological: Negative for syncope and light-headedness.   Psychiatric/Behavioral: Negative for confusion.     Objective:     Vital Signs (Most Recent):  Temp: 98.4 °F (36.9 °C) (11/22/19 0729)  Pulse: 94 (11/22/19 0730)  Resp: 15 (11/22/19 0729)  BP: (!) 152/67(Nurse Notified) (11/22/19 0730)  SpO2: (!) 90 % (11/22/19 0729) Vital Signs (24h Range):  Temp:  [97.7 °F (36.5 °C)-98.8 °F (37.1 °C)] 98.4 °F (36.9 °C)  Pulse:  [72-94] 94  Resp:  [15-19] 15  SpO2:  [90 %-94 %] 90 %  BP: (101-178)/(49-79) 152/67     Weight: 60.1 kg (132 lb 7.9 oz)  Body mass index is 28.67 kg/m².    Intake/Output Summary (Last 24 hours) at 11/22/2019 1015  Last data filed at 11/22/2019 0600  Gross per 24 hour   Intake 845 ml   Output 2675 ml   Net -1830 ml      Physical Exam   Constitutional: She is oriented to person, place, and time. She appears well-developed and well-nourished.   Frail    HENT:   Head: Normocephalic and atraumatic.   Eyes: Pupils are equal, round, and reactive to light. Conjunctivae are normal.   Neck: Normal range of motion. No JVD present.   Cardiovascular: Normal rate, regular rhythm, normal heart sounds and intact distal pulses.   Pulmonary/Chest: Effort normal. No respiratory distress. She has no wheezes.   Abdominal: Soft. Bowel sounds are normal. She exhibits no distension. There is no tenderness. There is no guarding.   Musculoskeletal: Normal range of  motion. She exhibits no edema or tenderness.   Neurological: She is alert and oriented to person, place, and time. No cranial nerve deficit.   Skin: Skin is warm and dry. Capillary refill takes less than 2 seconds.   Psychiatric: She has a normal mood and affect. Her behavior is normal.       Significant Labs:   CBC:   Recent Labs   Lab 11/20/19  1437 11/21/19  0214 11/22/19  0528   WBC 5.45 4.42 4.35   HGB 9.2* 9.0* 9.3*   HCT 29.6* 29.4* 30.0*    222 239     CMP:   Recent Labs   Lab 11/20/19  1437 11/21/19  0215 11/22/19  0528    136 137   K 3.4* 3.3* 3.5   CL 96 98 96   CO2 30* 29 32*    93 85   BUN 10 9 8   CREATININE 0.7 0.7 0.6   CALCIUM 8.5* 8.2* 8.1*   PROT 5.4*  --   --    ALBUMIN 2.8*  --   --    BILITOT 0.4  --   --    ALKPHOS 65  --   --    AST 42*  --   --    ALT 12  --   --    ANIONGAP 11 9 9   EGFRNONAA >60 >60 >60     Coagulation: No results for input(s): PT, INR, APTT in the last 48 hours.  TSH:   Recent Labs   Lab 11/01/19  1213   TSH 0.507     Urine Culture: No results for input(s): LABURIN in the last 48 hours.  Urine Studies:   Recent Labs   Lab 11/20/19  1413   COLORU Yellow   APPEARANCEUA Clear   PHUR 7.0   SPECGRAV 1.010   PROTEINUA Trace*   GLUCUA Negative   KETONESU Negative   BILIRUBINUA Negative   OCCULTUA Negative   NITRITE Negative   UROBILINOGEN Negative   LEUKOCYTESUR Trace*   RBCUA 0   WBCUA 6*   BACTERIA Rare   SQUAMEPITHEL 1       Significant Imaging: none

## 2019-11-22 NOTE — PT/OT/SLP PROGRESS
Occupational Therapy   Treatment    Name: Annette Garcia  MRN: [id number]  Admitting Diagnosis:  Syncope       Recommendations:     Discharge Recommendations: nursing facility, skilled  Discharge Equipment Recommendations:  none  Barriers to discharge:  Inaccessible home environment, Decreased caregiver support    Assessment:     Annette Garcia is a 84 y.o. female with a medical diagnosis of Syncope.  Performance deficits affecting function are weakness, impaired endurance, impaired self care skills, impaired functional mobilty, gait instability, impaired balance, decreased upper extremity function, decreased lower extremity function, impaired cardiopulmonary response to activity.  Pt found in bed, agreeable to therapy.  Pt with improved performance with bed mobility and sit<>stand this pm.  Progressing towards goals. Continue OT services to address functional goals, progressing as able.        Rehab Prognosis:  Good; patient would benefit from acute skilled OT services to address these deficits and reach maximum level of function.       Plan:     Patient to be seen 5 x/week to address the above listed problems via self-care/home management, therapeutic activities, therapeutic exercises  · Plan of Care Expires: 12/21/19  · Plan of Care Reviewed with: patient    Subjective     Pain/Comfort:  · Pain Rating 1: 0/10  · Pain Rating Post-Intervention 1: 0/10    Objective:     Communicated with: RN prior to session.  Patient found HOB elevated with gonzales catheter, telemetry, peripheral IV, bed alarm upon OT entry to room.    General Precautions: Standard, fall, aspiration   Orthopedic Precautions:N/A   Braces: N/A     Occupational Performance:     Bed Mobility:    · Patient completed Rolling/Turning to Left with  minimum assistance and with side rail  · Patient completed Scooting/Bridging with minimum assistance and scoot seated to EOB  · Patient completed Supine to Sit with minimum assistance, moderate assistance,  with side rail and increased time and effort, vc's for effective technique. HOB elevated     Functional Mobility/Transfers:  · Patient completed Sit <> Stand Transfer with contact guard assistance, minimum assistance and of 2 persons  with  rolling walker  x 2 trials, side stepping x ~3 steps each trial with Min A using RW.  VC's for upright posture. Pt with flexed trunk and neck.     Activities of Daily Living:  · Grooming: stand by assistance seated EOB to brush teeth      AMPAC 6 Click ADL: 15    Treatment & Education:  Pt sat EOB ~15 min with SBA while performing G/H task and BUE AA/AROM x 10 reps for shld flexion, rows, bicep curls and finger f/e.   Pt with kyphotic posture.      Patient left seated EOB with PT presentEducation:      GOALS:   Multidisciplinary Problems     Occupational Therapy Goals        Problem: Occupational Therapy Goal    Goal Priority Disciplines Outcome Interventions   Occupational Therapy Goal     OT, PT/OT Ongoing, Progressing    Description:  Goals to be met by: 12/21/2019     Patient will increase functional independence with ADLs by performing:      UE Dressing with Stand-by Assistance.  LE Dressing with Minimal Assistance.  Grooming while seated with Set-up Assistance.  Sitting at edge of bed x15 minutes with Supervision.  Rolling to Bilateral with Modified [place].   Stand pivot transfers with Stand-by Assistance.  Step transfer with Contact Guard Assistance  Toilet transfer to bedside commode with Contact Guard Assistance.  Increased functional strength to WFL for self care.  Upper extremity exercise program x10 reps per handout, with assistance as needed.                    Time Tracking:     OT Date of Treatment: 11/22/19  OT Start Time: 1318  OT Stop Time: 1341  OT Total Time (min): 23 min    Billable Minutes:Self Care/Home Management 15   *overlap with PT for mobility    MARY Saleh  11/22/2019

## 2019-11-22 NOTE — PLAN OF CARE
Spoke to Lizzette at [facility]  They have accepted patient back and will  patient as she has set up [employer] transportation  Called patient's daughter to let her know patient will dc back    Gave packet to nurse to call report    Discharge rounds on patient. Discussed followup appointments, blue discharge folder, discharge nurse will go over home medications and reasons for medications and final discharge instructions. All patient/caregiver questions answered. Patient verbalized understanding.         11/22/19 1415   Final Note   Assessment Type Final Discharge Note   Anticipated Discharge Disposition SNF   Hospital Follow Up  Appt(s) scheduled? Yes   Discharge plans and expectations educations in teach back method with documentation complete? Yes   Right Care Referral Info   Post Acute Recommendation SNF / Sub-Acute Rehab   Referral Type SNF   Facility Name [facility]     Soraida De Souza, RN, CCM, CMSRN  RN Transition Navigator  [phone]

## 2019-11-22 NOTE — NURSING
Cued into patient's room for evening rounds. In supine position with head of bed elevated and side rails x3 up for patient safety. Patient can only speak in a whisper. Nodding head that she is having pain. Will notify nurse to check on patient and medicate for pain as needed. Education on fall precautions. Patient nodding yes to understanding. Bed alarm is activated and is on. Will cotinue to be available as needed

## 2019-11-22 NOTE — PT/OT/SLP EVAL
Speech Language Pathology Evaluation  Cognitive Communication    Patient Name:  Annette Garcia   MRN:  [id number]  Admitting Diagnosis: Syncope    Recommendations:     Recommendations:                General Recommendations:  Speech/language therapy and Voice treatment  Diet recommendations:  Regular, Thin   Aspiration Precautions: 1 bite/sip at a time, Alternating bites/sips, HOB to 90 degrees, Meds whole 1 at a time, Monitor for s/s of aspiration, Remain upright 30 minutes post meal, Small bites/sips and Standard aspiration precautions   General Precautions: Standard, aspiration, fall  Communication strategies:  Reduce background noise. Encourage loud, clear speech.    History:     Patient presents with    Altered Mental Status       Pt presents to ED today from [facility] finished OT and speech therapy pt became unresponsive. pt has hisotry of narcolepsy. pt arrives to ED awake and alert          HPI: Ms. Annette Garcia is an 83 yo female with a past medical history of peripheral vascular disease, renovascular hypertension, renal artery stenosis status post left renal artery stent placement on 7/19/17, coronary artery disease with history of myocardial infarction, diffuse large B cell lymphoma, anemia, epilepsy, hypothyroidism, chronic hyponatremia, depression, lumbar and sacroiliac joint osteoarthritis with chronic low back pain and history of lumbar spine surgery in 2013, slow transit constipation, with hx of small bowel resection on 8/23/16. Her primary care physician is Dr. Jose Torres.      Recently admitted 10/27-11/12/19 for management of E coli bacteremia. She developed atrial fibrillation and sinus pauses during hospital course. Cardiology was consulted and placed a temporary transvenous pacemaker, which was removed on 11/4.      Patient presented to ED 11/20/19 for brief syncopal episode at [facility] after physical therapy. The staff report that she was unresponsive for a few  "minutes. There was no seizure like activity. No bowel/bladder incontinence. Patient unable to recall event. SBP reportedly 70s. She denies visual disturbance, confusion, fever, chills, sore throat, SOB, CP, abdominal pain, nausea, vomiting, difficulty urinating, changes in urinary frequency or urgency, back pain, HA, rash, any open wounds, or weakness. Per daughter patient has had similar events in the past.      Labs unremarkable. CT brain negative. CXR  Negative. VSS. Neuro intact. No acute findings on EKG. Patient admitted to [facility].     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    In the setting of septic shock and near death    Petit mal epilepsy 1954    Scoliosis of lumbar spine     Seizures     Unspecified hypothyroidism        Past Surgical History:   Procedure Laterality Date    APPENDECTOMY      BACK SURGERY  1988    vertebral fracture    BACK SURGERY  02/2013    lumbar L2-5    CATARACT EXTRACTION, BILATERAL Bilateral     CHOLECYSTECTOMY      open    EYE SURGERY Bilateral     cataract removal with lens implant    HYSTERECTOMY      PORTACATH PLACEMENT Right 09/2016    RENAL ARTERY STENT Left 07/19/2017    SIGMOIDECTOMY  10/29/2019    SMALL INTESTINE SURGERY  08/23/2016    TONSILLECTOMY      VAGINAL HYSTERECTOMY W/ ANTERIOR AND POSTERIOR VAGINAL REPAIR         Social History: Patient lives at [facility]. Prior to SNF admit, was living alone ind.    Prior Intubation HX:  None this admit.    Modified Barium Swallow: None per EMR.    Chest X-Rays:   Impression:       No failure or pneumonia or nodule.     Prior diet: Regular/Thin liquids.    Subjective     Pt seen at the bedside for informal speech-language/voice assessment. Pt awake/alert with RNSangeetha, present upon entry. Pt agreeable to SLP visit and recalled therapist from prior hospitalization.   Patient goals: "My speech therapist told me to " "speak to the right to sound better."     Pain/Comfort:  · Pain Rating 1: 0/10    Objective:   HOB elevated to 90*. RN present to administer meds. While SLP at the bedside, pt consumed pills whole x5-6 given cup sips water. No overt s/s of aspiration were noted with timely swallow initiation observed. Following speech-language assessment, pt observed to produce delayed cough s/p swallow water. SLP to f/u with diet safety next visit.     Cognitive Status:    Arousal/Alertness: Appropriate response to stimuli  Attention: mild deficit in sustained attn, repetition of stimuli provided as needed  Orientation: Oriented x4  Memory:  Immediate Recall : recalled 3/5 unrelated words ind; improved to 5/5 recall given semantic cues  Delayed: recalled 3/5 unrelated words after a 3 min filled delay; improved to 5/5 given semantic cues. SLP then provided pt with verbal short paragraph. Pt recalled salient information with 75% acc given min A.   Long term recall : WFL; pt recalled SLP's name from [facility], recalled child's name, birthday, and age  Numeric Recall: pt recalled digit spans ranging from 4-7 numbers in length with 88% acc. Pt then tasked with recalling digits backwards which was completed with 75% acc.   Safety awareness : mildly impaired 2/2 inadequate deficit awareness  Managing finances : impaired math reasoning. Simple addition and subtraction deemed WFL; however, breakdown evident with more complex word problems/math reasoning     Receptive Language:   Comprehension:   Questions Simple yes/no : 100% acc  Complex yes/no : 100% acc  Open ended questions : 100% acc  Commands  two step basic commands : 100% acc  multistep basic commands : 100% acc  complex/abstract commands : mild impairment  Object identifications 100% acc in Fo 10  Conversation: Receptive language deemed WFL for conversation with familiar and unfamiliar communication partners. Pt self sufficient in providing clarifying questions as needed during " "moments of misunderstanding.    Pragmatics:    Appropriate topic maintenance, turn taking, eye gaze, and affect    Expressive Language:  Verbal:    Initiation: timely speech intiiation  Naming Confrontation : 100% acc for common objects and Divergent responsive : Given 1 min time constraint, pt named 12 items within concrete category, mildly below norm of 15.   Conversational speech : Expressive language deemed WFL to communicate basic and complex wants/needs with familiar and unfamiliar listeners. Mild word finding deficit present which is exacerbated by underlying vocal impairment.       Motor Speech:  WFL    Voice:   Quality Breathy and Strained  Intensity : weak  Aphonia : several aphonic periods in spoken language   S/z ratio: Pt sustained /s/ for avg of 2.59 secs. Pt unable to produce /z/ phoneme.  "ah": pt sustained "ah" for .63 secs  "ee": pt sustained "ee" for .55 secs    Visual-Spatial:  WFL and L/R discrimination : WFL   Clock drawing: appropriate spacing and placement of letters. Hands do not meet in the center of the clock. Inaccurate time.     Reading:   TBD     Written Expression:   WFL  Dominant hand: Right  Assessment hand: Right    Treatment: Education provided re: role of SLP, POC, memory encoding strategies, voice strategies, vocal health/hygiene, and affect of cognitive deficits on safety. Pt verbalized understanding of all taught material. Time allowed for questions. No questions posed at this time. Pt will benefit from SLP tx 3x a week while inpatient to provide voice and cognitive-linguistic tx as well as ensure swallow safety.     Assessment:   Annette Garcia is a 84 y.o. 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  Pt familiar to SLP dept from previous admit. Cognition and voice although deficits appear at baseline. Pt will continue to benefit from SLP tx while inpatient and at next level of care. Swallow was not formally " addressed during speech-language evaluation this date; however, she was observed to take several pills with water without deficits. ST to f/u.     Goals:   Multidisciplinary Problems     SLP Goals        Problem: SLP Goal    Goal Priority Disciplines Outcome   SLP Goal     SLP Ongoing, Progressing   Description:  Short Term Goals:  1. Pt will participate in informal speech-language assessment to determine baseline function.  2. Pt will recall 90% of salient personal information after a delay with min A.  3. Pt will complete 4-5 step sequencing tasks with 90% acc.  4. Given one minute time constraint, pt will name 15 items within concrete category.  5. Pt will complete voice exercises x15 with good effort to improve vocal quality/intensity.                     Plan:   · Patient to be seen:  3 x/week   · Plan of Care expires:  12/21/19  · Plan of Care reviewed with:  patient   · SLP Follow-Up:  Yes       Discharge recommendations:      Barriers to Discharge:  None    Time Tracking:   SLP Treatment Date:   11/22/19  Speech Start Time:  0927  Speech Stop Time:  0956     Speech Total Time (min):  29 min    Billable Minutes: Paulaal Speech-Language: 15  and Seld Care/Home Management Training 14    Pauline Pugh CCC-SLP  11/22/2019

## 2019-11-22 NOTE — PT/OT/SLP PROGRESS
Physical Therapy Treatment    Patient Name:  Annette Garcia   MRN:  [id number]    Recommendations:     Discharge Recommendations:  nursing facility, skilled   Discharge Equipment Recommendations: none   Barriers to discharge: None    Assessment:     Annette Garcia is a 84 y.o. female admitted with a medical diagnosis of Syncope.  She presents with the following impairments/functional limitations:  gait instability, weakness, decreased ROM, decreased lower extremity function, impaired endurance, impaired balance, impaired functional mobilty, impaired self care skills, impaired skin, impaired cardiopulmonary response to activity, decreased upper extremity function . Patient able to take steps with RW +2 mod assist towards head of bed. Performed seated AROM BLE 10 reps each for hip ABD/ADD, LAQ,marching and ankle pumps. .    Rehab Prognosis: Good; patient would benefit from acute skilled PT services to address these deficits and reach maximum level of function.    Recent Surgery: * No surgery found *      Plan:     During this hospitalization, patient to be seen 6 x/week to address the identified rehab impairments via gait training, therapeutic activities, therapeutic exercises and progress toward the following goals:    · Plan of Care Expires:  12/21/19    Subjective     Chief Complaint: weakness  Patient/Family Comments/goals: go home  Pain/Comfort:  · Pain Rating 1: 0/10  · Pain Rating Post-Intervention 1: 0/10      Objective:     Communicated with primary nurse prior to session.  Patient found seated EOB with MORT[place]  with gonzales catheter, telemetry, SCD, bed alarm upon PT entry to room.     General Precautions: Standard, fall   Orthopedic Precautions:N/A   Braces: N/A     Functional Mobility:  · Bed Mobility:     · Sit to Supine: moderate assistance and maximal assistance  · Transfers:     · Sit to Stand:  contact guard assistance and minimum assistance with rolling walker  · Gait: 4 to 6 steps with mod +2 assist  using RW  · Balance: poor +      AM-PAC 6 CLICK MOBILITY  Turning over in bed (including adjusting bedclothes, sheets and blankets)?: 4  Sitting down on and standing up from a chair with arms (e.g., wheelchair, bedside commode, etc.): 3  Moving from lying on back to sitting on the side of the bed?: 2  Moving to and from a bed to a chair (including a wheelchair)?: 2  Need to walk in hospital room?: 2  Climbing 3-5 steps with a railing?: 1  Basic Mobility Total Score: 14       Therapeutic Activities and Exercises:   See assessment    Patient left HOB elevated with all lines intact, call button in reach and bed alarm on..    GOALS:   Multidisciplinary Problems     Physical Therapy Goals     Not on file          Multidisciplinary Problems (Resolved)        Problem: Physical Therapy Goal    Goal Priority Disciplines Outcome Goal Variances Interventions   Physical Therapy Goal   (Resolved)     PT, PT/OT Met     Description:  Goals to be met by: 2019     Patient will increase functional independence with mobility by performin. Supine to sit with MInimal Assistance  2. Sit to stand transfer with Minimal Assistance   3. Bed to chair transfer with Moderate Assistance using Rolling Walker  4. Gait  x 15 feet with Moderate Assistance using Rolling Walker.                       Time Tracking:     PT Received On: 19  PT Start Time: 1330     PT Stop Time: 1350  PT Total Time (min): 20 min     Billable Minutes: 10 there ex and gait 10    Treatment Type: Treatment  PT/PTA: PT     PTA Visit Number: 0     Hari Ramos, PT  2019

## 2019-11-22 NOTE — PLAN OF CARE
Problem: SLP Goal  Goal: SLP Goal  Description  Short Term Goals:  1. Pt will participate in informal speech-language assessment to determine baseline function.  2. Pt will recall 90% of salient personal information after a delay with min A.  3. Pt will complete 4-5 step sequencing tasks with 90% acc.  4. Given one minute time constraint, pt will name 15 items within concrete category.  5. Pt will complete voice exercises x15 with good effort to improve vocal quality/intensity.    Outcome: Ongoing, Progressing   11/22: Informal speech-language eval completed. Pt with mild cognitive impairment and mod-severe vocal impairment c/b weak, breathy vocal quality, impaired memory recall, impaired word fluency, and reduced higher level cognitive function. SLP to f/u for voice and cognitive tx.

## 2019-11-22 NOTE — PLAN OF CARE
[facility]    FACILITY TRANSFER ORDERS      Patient Name: Annette Garcia  YOB: 1935    PCP: Jose Valles MD   PCP Address: [address] / [place] [address]  PCP Phone Number: [phone]  PCP Fax: [phone]    Encounter Date: 11/22/2019    Admit to: [facility]     Vital Signs:  Routine    Diagnoses:   Active Hospital Problems    Diagnosis  POA    *Syncope [R55]  Yes    Palliative care encounter [Z51.5]  Not Applicable    Counseling regarding advance care planning and goals of care [Z71.89]  Not Applicable    Goals of care, counseling/discussion [Z71.89]  Not Applicable    Sinus pause [I45.5]  Yes     -11second sinus pause on 10/30/2019 due acute infectious illness; normal lytes and no BB/CCB use-treated with TVP with resolution and no recurrence       Peripheral vascular disease, unspecified [I73.9]  Yes     Chronic    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       Allergies:  Review of patient's allergies indicates:   Allergen Reactions    Adhesive Itching and Blisters    Penicillins Anaphylaxis    Tramadol Hives    Avelox [moxifloxacin] Rash     Facial and arm itching and redness. Pt states throat closes when given.    Amoxil [amoxicillin]     Aspridrox [aspirin, buffered]     Codeine Other (See Comments)     Throat swelling    Keflex [cephalexin]      Tolerated cefepime and cefazolin    Norvasc [amlodipine]     Red dye Hives    Robitussin [guaifenesin]     Sulfa (sulfonamide  antibiotics)     Tylenol [acetaminophen]      Has reaction to Tylenol with red dye and unable to take Extra Strength Tylenol/ CAN ONLY TOLERATE REG STRENGTH TYLENOL    Vicks vaporub [camphor-eucalyptus oil-menthol]        Diet: cardiac diet    Activities: Activity as tolerated    Nursing: per nursing home protocol     FU outpatient cards for possible Holter monitor.     CONSULTS:    Physical Therapy to evaluate and treat.  and Occupational Therapy to evaluate and treat.      Medications: Review discharge medications with patient and family and provide education.      Current Discharge Medication List      CONTINUE these medications which have CHANGED    Details   lisinopril (PRINIVIL,ZESTRIL) 40 MG tablet Take 1 tablet (40 mg total) by mouth once daily.  Qty: 90 tablet, Refills: 3    Associated Diagnoses: Essential hypertension         CONTINUE these medications which have NOT CHANGED    Details   acetaminophen (TYLENOL) 325 MG tablet Take 2 tablets (650 mg total) by mouth every 4 (four) hours as needed for Pain.  Refills: 0      calcium carbonate (OS-RICHARDSON) 600 mg calcium (1,500 mg) Tab Take 0.5 tablets (300 mg total) by mouth once daily.  Refills: 0      levothyroxine (SYNTHROID) 125 MCG tablet TAKE 1 TABLET (125 MCG TOTAL) BY MOUTH ONCE DAILY.  Qty: 90 tablet, Refills: 3      magnesium 250 mg Tab Take 1 tablet (250 mg total) by mouth once daily.  Refills: 0      ondansetron (ZOFRAN) 4 MG tablet Take 1 tablet (4 mg total) by mouth every 8 (eight) hours as needed for Nausea.  Qty: 25 tablet, Refills: 2      OXcarbazepine (TRILEPTAL) 300 MG Tab Take 1 tablet (300 mg total) by mouth nightly.  Qty: 90 tablet, Refills: 3    Associated Diagnoses: Nonintractable absence epilepsy without status epilepticus      PHENobarbital (LUMINAL) 64.8 MG tablet Take 1 tablet (64.8 mg total) by mouth every evening.  Qty: 90 tablet, Refills: 5    Comments: Not to exceed 4 additional fills before 10/12/2019 DX Code Needed  .  Associated  Diagnoses: Partial symptomatic epilepsy with complex partial seizures, not intractable, without status epilepticus      polyethylene glycol (GLYCOLAX) 17 gram PwPk Take 17 g by mouth 2 (two) times daily as needed (Constipation).  Refills: 0      pravastatin (PRAVACHOL) 20 MG tablet Take 1 tablet (20 mg total) by mouth once daily.  Qty: 90 tablet, Refills: 3      vitamin D (VITAMIN D3) 1000 units Tab Take 1,000 Units by mouth once daily.      cloNIDine (CATAPRES) 0.2 MG tablet Take 1 tablet (0.2 mg total) by mouth 2 (two) times daily.  Qty: 180 tablet, Refills: 3         STOP taking these medications       carvedilol (COREG) 25 MG tablet Comments:   Reason for Stopping:         denosumab (PROLIA) 60 mg/mL Syrg Comments:   Reason for Stopping:         oxybutynin (DITROPAN) 5 MG Tab Comments:   Reason for Stopping:         triamcinolone acetonide 0.1% (KENALOG) 0.1 % cream Comments:   Reason for Stopping:                    _________________________________  Alice Barker MD  11/22/2019

## 2019-11-22 NOTE — ASSESSMENT & PLAN NOTE
Etiology unclear, neuro intact on exam, no acute findings on EKG. Patient with recent hospitalized 10/27-11/12/19. She developed atrial fibrillation with atrial pauses requiring temporary transvenous pacemaker, which was removed on 11/4. Pt denies chest pain and palpitations on exam.      Low suspicion for seizure activity   Monitor telemetry   Orthostatic vitals positive SBP 170s lying down to 140s upon standing   IVF deferred 2/2 hypertension   Cardiology consulted- appreciates rec

## 2019-11-22 NOTE — PROGRESS NOTES
[facility] Hospital Medicine  Progress Note    Patient Name: Annette Garcia  MRN: [id number]  Patient Class: OP- Observation   Admission Date: 11/20/2019  Length of Stay: 0 days  Attending Physician: Alice Barker*  Primary Care Provider: Jose Valles MD        Subjective:     Principal Problem:Syncope        HPI:  Ms. Annette Garcia is an 83 yo female with a past medical history of peripheral vascular disease, renovascular hypertension, renal artery stenosis status post left renal artery stent placement on 7/19/17, coronary artery disease with history of myocardial infarction, diffuse large B cell lymphoma, anemia, epilepsy, hypothyroidism, chronic hyponatremia, depression, lumbar and sacroiliac joint osteoarthritis with chronic low back pain and history of lumbar spine surgery in 2013, slow transit constipation, with hx of small bowel resection on 8/23/16. Her primary care physician is Dr. Jose Torres.     Recently admitted 10/27-11/12/19 for management of E coli bacteremia. She developed atrial fibrillation and sinus pauses during hospital course. Cardiology was consulted and placed a temporary transvenous pacemaker, which was removed on 11/4.     Patient presented to ED 11/20/19 for brief syncopal episode at [facility] after physical therapy. The staff report that she was unresponsive for a few minutes. There was no seizure like activity. No bowel/bladder incontinence. Patient unable to recall event. SBP reportedly 70s. She denies visual disturbance, confusion, fever, chills, sore throat, SOB, CP, abdominal pain, nausea, vomiting, difficulty urinating, changes in urinary frequency or urgency, back pain, HA, rash, any open wounds, or weakness. Per daughter patient has had similar events in the past.      Labs unremarkable. CT brain negative. CXR  Negative. VSS. Neuro intact. No acute findings on EKG. Patient admitted to [facility].      Overview/Hospital Course:  No events on telemetry overnight. No additional episodes of syncope. Orthostatic vitals positive, fluids deferred secondary to hypertension (-180s). Will consult cardiology for evaluation prior to discharge.    Appreciates card's rec; d/c BB and FU outpatient with cards for possible Holter Monitor    Interval History: awake and alert. No new complaint. Appreciates cards rec's no BB, can fu card as outpatient for a 30 possible Holter monitor      Review of Systems   Constitutional: Negative for chills, diaphoresis and fever.   Respiratory: Negative for cough and shortness of breath.    Cardiovascular: Negative for chest pain, palpitations and leg swelling.   Gastrointestinal: Negative for abdominal pain, nausea and vomiting.   Genitourinary: Negative for dysuria, flank pain, frequency and hematuria.   Musculoskeletal: Negative for back pain and myalgias.   Neurological: Negative for syncope and light-headedness.   Psychiatric/Behavioral: Negative for confusion.     Objective:     Vital Signs (Most Recent):  Temp: 98.4 °F (36.9 °C) (11/22/19 0729)  Pulse: 94 (11/22/19 0730)  Resp: 15 (11/22/19 0729)  BP: (!) 152/67(Nurse Notified) (11/22/19 0730)  SpO2: (!) 90 % (11/22/19 0729) Vital Signs (24h Range):  Temp:  [97.7 °F (36.5 °C)-98.8 °F (37.1 °C)] 98.4 °F (36.9 °C)  Pulse:  [72-94] 94  Resp:  [15-19] 15  SpO2:  [90 %-94 %] 90 %  BP: (101-178)/(49-79) 152/67     Weight: 60.1 kg (132 lb 7.9 oz)  Body mass index is 28.67 kg/m².    Intake/Output Summary (Last 24 hours) at 11/22/2019 1015  Last data filed at 11/22/2019 0600  Gross per 24 hour   Intake 845 ml   Output 2675 ml   Net -1830 ml      Physical Exam   Constitutional: She is oriented to person, place, and time. She appears well-developed and well-nourished.   Frail    HENT:   Head: Normocephalic and atraumatic.   Eyes: Pupils are equal, round, and reactive to light. Conjunctivae are normal.   Neck: Normal range of motion. No  JVD present.   Cardiovascular: Normal rate, regular rhythm, normal heart sounds and intact distal pulses.   Pulmonary/Chest: Effort normal. No respiratory distress. She has no wheezes.   Abdominal: Soft. Bowel sounds are normal. She exhibits no distension. There is no tenderness. There is no guarding.   Musculoskeletal: Normal range of motion. She exhibits no edema or tenderness.   Neurological: She is alert and oriented to person, place, and time. No cranial nerve deficit.   Skin: Skin is warm and dry. Capillary refill takes less than 2 seconds.   Psychiatric: She has a normal mood and affect. Her behavior is normal.       Significant Labs:   CBC:   Recent Labs   Lab 11/20/19  1437 11/21/19  0214 11/22/19  0528   WBC 5.45 4.42 4.35   HGB 9.2* 9.0* 9.3*   HCT 29.6* 29.4* 30.0*    222 239     CMP:   Recent Labs   Lab 11/20/19  1437 11/21/19  0215 11/22/19  0528    136 137   K 3.4* 3.3* 3.5   CL 96 98 96   CO2 30* 29 32*    93 85   BUN 10 9 8   CREATININE 0.7 0.7 0.6   CALCIUM 8.5* 8.2* 8.1*   PROT 5.4*  --   --    ALBUMIN 2.8*  --   --    BILITOT 0.4  --   --    ALKPHOS 65  --   --    AST 42*  --   --    ALT 12  --   --    ANIONGAP 11 9 9   EGFRNONAA >60 >60 >60     Coagulation: No results for input(s): PT, INR, APTT in the last 48 hours.  TSH:   Recent Labs   Lab 11/01/19  1213   TSH 0.507     Urine Culture: No results for input(s): LABURIN in the last 48 hours.  Urine Studies:   Recent Labs   Lab 11/20/19  1413   COLORU Yellow   APPEARANCEUA Clear   PHUR 7.0   SPECGRAV 1.010   PROTEINUA Trace*   GLUCUA Negative   KETONESU Negative   BILIRUBINUA Negative   OCCULTUA Negative   NITRITE Negative   UROBILINOGEN Negative   LEUKOCYTESUR Trace*   RBCUA 0   WBCUA 6*   BACTERIA Rare   SQUAMEPITHEL 1       Significant Imaging: none      Assessment/Plan:      * Syncope  Etiology unclear, neuro intact on exam, no acute findings on EKG. Patient with recent hospitalized 10/27-11/12/19. She developed atrial  fibrillation with atrial pauses requiring temporary transvenous pacemaker, which was removed on 11/4. Pt denies chest pain and palpitations on exam.      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       Anemia due to antineoplastic chemotherapy  H/H stable, monitor       Acquired hypothyroidism  Continue synthroid 125 mcg daily       Epilepsy  Continue phenobarbital and oxcarbazepine   Seizure precautions       Renovascular hypertension  Peripheral vascular disease, unspecified    Taking pravastatin, carvedilol, clonidine and lisinopril               VTE Risk Mitigation (From admission, onward)         Ordered     IP VTE HIGH RISK PATIENT  Once      11/20/19 2235     Place sequential compression device  Until discontinued      11/20/19 2235                      Alice Barker MD  Department of Hospital Medicine   [facility]

## 2019-11-22 NOTE — NURSING
Patient Awake, Alert and Oriented.  No S/S of distress. Assisted to Wheelchair per [facility], and DC'd  to SNF.

## 2019-11-22 NOTE — ASSESSMENT & PLAN NOTE
Peripheral vascular disease, unspecified    Taking pravastatin, carvedilol, clonidine and lisinopril   D/c Carvediol and Add norvasc

## 2019-11-22 NOTE — PLAN OF CARE
Problem: Adult Inpatient Plan of Care  Goal: Plan of Care Review  Patient is awake and orientedx4. Care plan explained to patient; she verbalized understanding. On room air, O2 saturation at 94%. Hooked to heart monitor running normal sinus rhythm at 72-81bpm. Martinez in place, draining dark yellow urine. No n/v/d during shift. Patient had a headache, 650mg of tylenol given. Due medications given. Neuro checks every 4 hours. LLQ colostomy intact. Encouraged to turn every 2 hours as tolerated. Maintained on fall risk precaution. Bed in lowest position, bed alarm on, call light/personal items within reach and instructed to call for help when needed. Will continue to monitor.       Outcome: Ongoing, Progressing

## 2019-11-22 NOTE — PLAN OF CARE
11/22/19 1417   Post-Acute Status   Post-Acute Authorization Placement   Post-Acute Placement Status Set-up Complete

## 2019-11-22 NOTE — PLAN OF CARE
[facility]     Department of Hospital Medicine     [address] [place] [address]     [phone] after hours  [phone] fax       NURSING HOME ORDERS    11/22/2019    Admit to Nursing Home:  Skilled Bed                                                 Diagnoses:  Active Hospital Problems    Diagnosis  POA    *Syncope [R55]  Yes    Sinus pause [I45.5]  Yes     -11second sinus pause on 10/30/2019 due acute infectious illness; normal lytes and no BB/CCB use-treated with TVP with resolution and no recurrence       Peripheral vascular disease, unspecified [I73.9]  Yes     Chronic    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         Allergies:  Review of patient's allergies indicates:   Allergen Reactions    Adhesive Itching and Blisters    Penicillins Anaphylaxis    Tramadol Hives    Avelox [moxifloxacin] Rash     Facial and arm itching and redness. Pt states throat closes when given.    Amoxil [amoxicillin]     Aspridrox [aspirin, buffered]     Codeine Other (See Comments)     Throat swelling    Keflex [cephalexin]      Tolerated cefepime and cefazolin    Norvasc [amlodipine]     Red dye Hives    Robitussin [guaifenesin]     Sulfa (sulfonamide antibiotics)     Tylenol [acetaminophen]      Has reaction to Tylenol with red dye and unable to take Extra Strength Tylenol/ CAN ONLY TOLERATE REG STRENGTH TYLENOL    Vicks vaporub [camphor-eucalyptus oil-menthol]        Vitals:       Every shift  (Skilled Nursing patients)    Diet: low sodium, Cardiac     Acitivities:    - Up in a chair each morning as tolerated   - Ambulate with assistance to bathroom   - Scheduled walks once each shift (every 8 hours)   - May use walker, cane, or self-propelled wheelchair      LABS:  Per facility protocol    Nursing Precautions:     - Fall precautions per nursing home protocol   - Seizure precaution per shelter protocol   - Decubitus precautions:        -  for positioning   - Pressure reducing foam mattress   - Turn patient every two hours. Use wedge pillows to anchor patient    CONSULTS:      Physical Therapy to evaluate and treat     Occupational Therapy to evaluate and treat     Speech Therapy  to evaluate and treat     Nutrition to evaluate and recommend diet         MISCELLANEOUS CARE:             Colostomy Care:  Empty bag every shift and prn                                             Change and clean site every 48 hours     Martinez Care: Empty Martinez bag every shift.  Change Martinez every month            Medications: Discontinue all previous medication orders, if any. See new list below.      Annette Garcia   Home Medication Instructions STEFANO:[id number]    Printed on:11/22/19 9491   Medication Information                      acetaminophen (TYLENOL) 325 MG tablet  Take 2 tablets (650 mg total) by mouth every 4 (four) hours as needed for Pain.             calcium carbonate (OS-RICHARDSON) 600 mg calcium (1,500 mg) Tab  Take 0.5 tablets (300 mg total) by mouth once daily.             cloNIDine (CATAPRES) 0.2 MG tablet  Take 1 tablet (0.2 mg total) by mouth 2 (two) times daily.             denosumab (PROLIA) 60 mg/mL Syrg  Inject 60 mg into the skin every 6 (six) months.             levothyroxine (SYNTHROID) 125 MCG tablet  TAKE 1 TABLET (125 MCG TOTAL) BY MOUTH ONCE DAILY.             lisinopril (PRINIVIL,ZESTRIL) 20 MG tablet  Take 1 tablet (20 mg total) by mouth once daily.             magnesium 250 mg  Tab  Take 1 tablet (250 mg total) by mouth once daily.             ondansetron (ZOFRAN) 4 MG tablet  Take 1 tablet (4 mg total) by mouth every 8 (eight) hours as needed for Nausea.             OXcarbazepine (TRILEPTAL) 300 MG Tab  Take 1 tablet (300 mg total) by mouth nightly.             oxybutynin (DITROPAN) 5 MG Tab  One po every 6 hours as need for bladder irritation             PHENobarbital (LUMINAL) 64.8 MG tablet  Take 1 tablet (64.8 mg total) by mouth every evening.             polyethylene glycol (GLYCOLAX) 17 gram PwPk  Take 17 g by mouth 2 (two) times daily as needed (Constipation).             pravastatin (PRAVACHOL) 20 MG tablet  Take 1 tablet (20 mg total) by mouth once daily.             triamcinolone acetonide 0.1% (KENALOG) 0.1 % cream  Apply topically 2 (two) times daily. When needed for sores on her head             vitamin D (VITAMIN D3) 1000 units Tab  Take 1,000 Units by mouth once daily.                       _________________________________  Zeinab eTjada NP  11/22/2019

## 2019-11-22 NOTE — PLAN OF CARE
Sent discharge orders to [facility]- awaiting call back from Annamarie after she reviews orders       11/22/19 6047   Post-Acute Status   Post-Acute Authorization Placement   Post-Acute Placement Status Patient Evaluation by Facility     Soraida De Souza RN, CCM, CMSRN  RN Transition Navigator  [phone]

## 2019-11-22 NOTE — CONSULTS
Consult Note  Palliative Care      Consult Requested By: Alice Barker*  Reason for Consult: Disease Education    SUBJECTIVE:     History of Present Illness:  Disease Process:Syncope    Ms. Annette Garcia is an 83 yo female with a past medical history of peripheral vascular disease, renovascular hypertension, renal artery stenosis status post left renal artery stent placement on 7/19/17, coronary artery disease with history of myocardial infarction, diffuse large B cell lymphoma, anemia, epilepsy, hypothyroidism, chronic hyponatremia, depression, lumbar and sacroiliac joint osteoarthritis with chronic low back pain and history of lumbar spine surgery in 2013, slow transit constipation, with hx of small bowel resection on 8/23/16. Her primary care physician is Dr. Jose Torres.      Recently admitted 10/27-11/12/19 for management of E coli bacteremia. She developed atrial fibrillation and sinus pauses during hospital course. Cardiology was consulted and placed a temporary transvenous pacemaker, which was removed on 11/4.      Patient presented to ED 11/20/19 for brief syncopal episode at [facility] after physical therapy. The staff report that she was unresponsive for a few minutes. There was no seizure like activity. No bowel/bladder incontinence. Patient unable to recall event. SBP reportedly 70s. She denies visual disturbance, confusion, fever, chills, sore throat, SOB, CP, abdominal pain, nausea, vomiting, difficulty urinating, changes in urinary frequency or urgency, back pain, HA, rash, any open wounds, or weakness. Per daughter patient has had similar     Palliative medicine met with patient. Awake and alert. No new compliants. Discussed current disease trajectory. S/S of disease. Daughter Beatris verbalize understanding. GOC is for patient to continue with treatment. Would like to remain a full code. Discharge back to nursing home.      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    In the setting of septic shock and near death    Petit mal epilepsy 1954    Scoliosis of lumbar spine     Seizures     Unspecified hypothyroidism      Past Surgical History:   Procedure Laterality Date    APPENDECTOMY      BACK SURGERY  1988    vertebral fracture    BACK SURGERY  02/2013    lumbar L2-5    CATARACT EXTRACTION, BILATERAL Bilateral     CHOLECYSTECTOMY      open    EYE SURGERY Bilateral     cataract removal with lens implant    HYSTERECTOMY      PORTACATH PLACEMENT Right 09/2016    RENAL ARTERY STENT Left 07/19/2017    SIGMOIDECTOMY  10/29/2019    SMALL INTESTINE SURGERY  08/23/2016    TONSILLECTOMY      VAGINAL HYSTERECTOMY W/ ANTERIOR AND POSTERIOR VAGINAL REPAIR       Family History   Problem Relation Age of Onset    Pancreatic cancer Mother     Hypertension Father     Heart attack Father      Social History     Tobacco Use    Smoking status: Never Smoker    Smokeless tobacco: Never Used   Substance Use Topics    Alcohol use: No    Drug use: No       Mental Status: Oriented x3    ECOG Performance Status Grade: 4 - Completely disabled    Review of Systems:  Review of systems not obtained due to patient factors none.    OBJECTIVE:     Pain Assessment: No pain reported at this time      Advanced Directives:  Living Will: No  Do Not Resuscitate Status: No  Medical Power of : No  Registered Organ Donor: No    Living Arrangements: Lives in nursing home    Psychosocial, Spiritual, Cultural:  Patient's most important priorities:  none  Patient's biggest concerns/fears:  none    Previous death/end of life care history:  none    Patient's goals/hopes:  none    ASSESSMENT/PLAN:     Recommendations:  Continue medical treatment  Code status: Full code  Patient will need on going education. Please f/u Palliative medicine clinic    Palliative care will continue to follow.     Thank you for the consult and  the opportunity to participate in Ms. Garcia's  care.       Stephanie Leyva, MSN, APRN, NP-C   Palliative Medicine   [facility]  [phone] or [phone]        >50% of 60  min visit spent in chart review, face to face discussion of goals of care with patient, family, symptom assessment, coordination of care and emotional support.

## 2019-11-25 LAB
BACTERIA BLD CULT: NORMAL
BACTERIA BLD CULT: NORMAL

## 2019-11-25 NOTE — PROGRESS NOTES
[facility]                                                                              Skilled Nursing Facility                                                                  Progress Note     Admit Date:  11/12/2019  JESSICA   Principal Problem:  S/P sepsis/JAS sigmoid colon resection  HPI obtained from patient interview and chart review     Visit Date: 11/26/2019    Chief Complaint:  Return to facility s/p admission for syncope, right ankle pain    HPI:   Ms. Garcia is an 84-year-old female with a past medical history of peripheral vascular disease, renovascular hypertension, renal artery stenosis status post left renal artery stent placement on 7/19/17, coronary artery disease with history of myocardial infarction, diffuse large B cell lymphoma, anemia, epilepsy, hypothyroidism, chronic hyponatremia, depression, lumbar and sacroiliac joint osteoarthritis with chronic low back pain and history of lumbar spine surgery in 2013, slow transit constipation, with hx of small bowel resection on 8/23/16. [place] [facility] [place]o abdominal pain, difficulty urinating, and constipation for one day. In the emergency department, she was found to have acute kidney injury (BUN 27, creatinine 1.6, from 9 and 0.7 on 8/29/19), elevated transaminases ( and , from 16 and 11 on 8/29/19), and lactic acidosis (5.8). Contrast CT showed acute enteritis, sigmoid and rectal constipation, and some peritoneal free fluid in the pericolic gutters. She was tx with IVF, and multiple antibiotics. She ultimately suffered from septic shock, was intubated, had a dialysis catheter placed due to oliguria, had a transvenous pacer placed for AF with sinus pauses, and was followed do hyponatremia in light of epilepsy hx. She also developed metabolic encephalopathy post op. She is s/p sigmoid colectomy with end  colostomy after finding sigmoid necrosis on 10/29. A Gonzales catheter was placed on 11/5/19 for urinary retention and she will follow up with Urology. The gonzales was d/anabella prior to admit to [facility], but had to be reinserted on 11/15 due to continued retention. She was found to have right vocal cord paralysis as well and ENT was consulted, who recommended at CT scan to f/u once JAS resolved. Infectious Disease recommended finishing cephalexin, metronidazole, and fluconazole on 11/15/19. SNF recommended for further care after d/c.    Patient will be treated at [facility] SNF with PT and OT to improve functional status and ability to perform ADLs.     Interval history:  Patient sent to ED for syncopal episode of facility, admitted under Hospital Medicine. Return to facility on 11/22.  Patient worked up due to history of seizure and concurrent use of anti seizure medications.  Low suspicion for seizure activity, patient had positive orthostatics in previous atrial phos is requiring transvenous pacer.  Cardiology consulted and recommended to DC beta-blocker, Coreg DC during admission, lisinopril increased for BP control.  Recommended to follow up with Cardiology after DC for 30 day Holter monitor. Patient reporting R ankle pain during visit today, stating it spontaneously starting hurting overnight. She also reports a hx of 13 pathological fxs in the past. Likely needs further workup. 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    Past Surgical History: Patient has a past surgical history that includes Cholecystectomy; Appendectomy; Tonsillectomy; Vaginal hysterectomy w/ anterior and posterior vaginal repair; Cataract extraction, bilateral (Bilateral); Hysterectomy; Eye surgery (Bilateral); Portacath  placement (Right, 09/2016); Back surgery (1988); Back surgery (02/2013); Renal artery stent (Left, 07/19/2017); Small intestine surgery (08/23/2016); and Sigmoidectomy (10/29/2019).    Social History: Patient reports that she has never smoked. She has never used smokeless tobacco. She reports that she does not drink alcohol or use drugs.    Family History: family history includes Heart attack in her father; Hypertension in her father; Pancreatic cancer in her mother.    Allergies: Patient is allergic to adhesive; penicillins; tramadol; avelox [moxifloxacin]; amoxil [amoxicillin]; aspridrox [aspirin, buffered]; codeine; keflex [cephalexin]; norvasc [amlodipine]; red dye; robitussin [guaifenesin]; sulfa (sulfonamide antibiotics); tylenol [acetaminophen]; and vicks vaporub [camphor-eucalyptus oil-menthol].    ROS  Constitutional: Negative for fever or fatigue, + hoarse voice   Eyes: Negative for blurred vision, double vision and discharge.   Respiratory: Negative for cough, shortness of breath and wheezing.    Cardiovascular: Negative for chest pain, palpitations, and leg swelling.   Gastrointestinal: Negative for abdominal pain, constipation, diarrhea, nausea and vomiting.   Genitourinary: Negative for dysuria, frequency and urgency.   Musculoskeletal:  + generalized weakness. Negative for back pain and myalgias, + right ankle pain.   Skin: Negative for itching and rash.   Neurological: Negative for dizziness, speech change, and headaches.   Psychiatric/Behavioral: Negative for depression. The patient is not nervous/anxious.      PEx     Constitutional: Patient appears well-developed and in no distress   Head: Normocephalic and atraumatic.   Eyes: Pupils are equal, round, and reactive to light.   Neck: Normal range of motion. Neck supple.   Cardiovascular: Normal rate, regular rhythm and normal heart sounds.    Pulmonary/Chest: Effort normal and breath sounds are clear  Abdominal: Soft. Bowel sounds are normal.    Musculoskeletal: Normal range of motion, + right ankle pain.   Neurological: Alert and oriented to person, place, and time.   Psychiatric: Normal mood and affect. Behavior is normal, + hoarse voice.   Skin: Skin is warm and dry. Full skin assessment completed during visit, + stage II pressure ulcer to coccyx, R U chest port a cath, Abdominal surgical incision present      Assessment and Plan:    Right ankle pain, new  -11/26 initiate right ankle xray    Aftercare of Syncope, resolved  Orthostatic hypotension, resolved  -Etiology unclear, neuro intact on exam, no acute findings on EKG> recent atrial fibrillation with atrial pauses requiring temporary transvenous pacemaker, which was removed on 11/4>Low suspicion for seizure activity>Orthostatic vitals positive SBP 170s lying down to 140s upon standing>IVF deferred 2/2 hypertension   -Cardiology recommended to D/C coreg  -11/26 Monitor for continued symptoms, fu on 30 day holter monitor status recommended by cards during admit    Debility r/t recent sigmoidectomy/ARF  - Continue with PT/OT for gait training and strengthening and restoration of ADL's   - Encourage mobility, OOB in chair, and early ambulation as appropriate  - Fall precautions   - Monitor for bowel and bladder dysfunction  - Monitor for and prevent skin breakdown and pressure ulcers  - Pain control: Tylenol PRN  -11/26 Continue PT/OT    Peripheral vascular disease, unspecified  Remote Hx of MI   HTN with Hyperlipidemia, stable  -Continue Lisinopril, Coreg ( d/anabella during admit), pravastatin  -11/26 Continue current regimen    Continued    Colon necrosis, improved   S/P sigmoidectomy 10/29/19  E. coli bacteremia, resolved  -Treated with Keflexand metronidazole per ID until 11/15/16    Hypophosphatemia, improved  Hypomagnesemia, improved  Hypokalemia, improved  -continue neutraphos, KCl replacement, Mg replacement    Right vocal cord paralysis with hoarse voice, ongoing  -ENT consulted to during admit,  recommended a CT neck and chest with IV contrast once kidney function improved  -ST following    -Chloraseptic spray PRN sore throat     Acute urinary retention, ongoing  -Follow up with Urology as outpatient.   -Martinez re inserted at facility on 11/15 for retention    Multiple Wounds, ongoing  1. Stage II coccyx PU  2. Abdominal surgical incision  3.R chest wall port accessed  -Awaiting measurements from WC RN, will follow weekly    DLBCL (diffuse large B cell lymphoma)  Anemia due to antineoplastic chemotherapy  Thrombocytopenia, stable  -Transfused pRBCs during admit  -Patient has RU chest wall port that is accessed upon admit to facility  -Follow up with hem onc as outpatient.    Slow transit constipation, ongoing  -Continue polyethylene glycol BID (home med)    Epilepsy, chronic, stable  MDD with recurrent episodes, ongoing  -Continue home mirtazapine 7.5mg HS.  -Continue phenobarb, Trileptal     Acquired hypothyroidism, stable  -Continue home levothyroxine 125 mcg before breakfast.    Vitamin D Deficiency, stable  -Continue Vitamin D 3 replacement + Os sukhdev 1500 mg QD    Vaginal Yeast infection, ongoing  -Treated with 4 day course of low dose Diflucan during admit> infection persists.   -Tx with Diflucan 150 mg PO x 1 dose today      Acute renal failure due to Acute tubular necrosis, resolved  DIC (disseminated intravascular coagulation), resolved    Outpatient MDs: Her primary care physician is Dr. Jose Torres. Her pain management specialist is Dr. Chirag Bates. Her neurologist is Dr. Gamal Lock.       Future Appointments   Date Time Provider Department Center   11/27/2019  3:20 PM Chris Johnson MD [facility] G[facility]UR [facility]   12/6/2019  1:00 PM Carleen Estrada MD [facility] UROLOGY [facility]   12/12/2019 10:00 AM Selam Mills MD [facility] MAREN [facility]   12/17/2019  9:40 AM Jose Valles MD [facility] FAM MED [place] [facility]   4/2/2020 11:00 AM CHAIR 07 [facility] CHEMO [facility]            Total time of the visit 67 minutes  Non physical exam/ non charting time: 45 minutes   Start Time:0800  Stop Time:0907  Description of non physical exam/non charting time: counseling patient on clinical conditions and therapies provided regarding new BP management, pain management, therapy plan of care, BM regulation.  Extensive chart review completed including all consultation notes.  All pertinent laboratory and radiographical images reviewed.        Anat Dawkins NP          Patient note was created using MModal Dictation.  Any errors in syntax or even information may not have been identified and edited on initial review prior to signing this note.

## 2019-11-26 ENCOUNTER — DOCUMENTATION ONLY (OUTPATIENT)
Dept: HEPATOLOGY | Facility: HOSPITAL | Age: 84
End: 2019-11-26

## 2019-11-29 ENCOUNTER — PATIENT OUTREACH (OUTPATIENT)
Dept: ADMINISTRATIVE | Facility: CLINIC | Age: 84
End: 2019-11-29

## 2019-12-02 ENCOUNTER — PATIENT OUTREACH (OUTPATIENT)
Dept: ADMINISTRATIVE | Facility: HOSPITAL | Age: 84
End: 2019-12-02

## 2019-12-03 ENCOUNTER — DOCUMENTATION ONLY (OUTPATIENT)
Dept: HEPATOLOGY | Facility: HOSPITAL | Age: 84
End: 2019-12-03

## 2019-12-03 RX ORDER — MIRTAZAPINE 7.5 MG/1
7.5 TABLET, FILM COATED ORAL NIGHTLY
Qty: 30 TABLET | Refills: 11
Start: 2019-12-03 | End: 2022-05-20

## 2019-12-03 NOTE — PROGRESS NOTES
[facility]                                                                              Skilled Nursing Facility                                                                  Progress Note     Admit Date:  11/12/2019  JESSICA   Principal Problem:  S/P sepsis/JAS sigmoid colon resection  HPI obtained from patient interview and chart review     Visit Date: 12/3/2019    Chief Complaint:  X-ray review, re-evaluation of right ankle pain, PT/OT progression    HPI:   Ms. Garcia is an 84-year-old female with a past medical history of peripheral vascular disease, renovascular hypertension, renal artery stenosis status post left renal artery stent placement on 7/19/17, coronary artery disease with history of myocardial infarction, diffuse large B cell lymphoma, anemia, epilepsy, hypothyroidism, chronic hyponatremia, depression, lumbar and sacroiliac joint osteoarthritis with chronic low back pain and history of lumbar spine surgery in 2013, slow transit constipation, with hx of small bowel resection on 8/23/16. She presented to [facility] with c/o abdominal pain, difficulty urinating, and constipation for one day. In the emergency department, she was found to have acute kidney injury (BUN 27, creatinine 1.6, from 9 and 0.7 on 8/29/19), elevated transaminases ( and , from 16 and 11 on 8/29/19), and lactic acidosis (5.8). Contrast CT showed acute enteritis, sigmoid and rectal constipation, and some peritoneal free fluid in the pericolic gutters. She was tx with IVF, and multiple antibiotics. She ultimately suffered from septic shock, was intubated, had a dialysis catheter placed due to oliguria, had a transvenous pacer placed for AF with sinus pauses, and was followed do hyponatremia in light of epilepsy hx. She also developed metabolic encephalopathy post op. She is s/p sigmoid colectomy with end  colostomy after finding sigmoid necrosis on 10/29. A Gonzales catheter was placed on 11/5/19 for urinary retention and she will follow up with Urology. The gonzales was d/anabella prior to admit to [facility], but had to be reinserted on 11/15 due to continued retention. She was found to have right vocal cord paralysis as well and ENT was consulted, who recommended at CT scan to f/u once JAS resolved. Infectious Disease recommended finishing cephalexin, metronidazole, and fluconazole on 11/15/19. SNF recommended for further care after d/c.    Patient will be treated at [facility] SNF with PT and OT to improve functional status and ability to perform ADLs.     Interval history:  X-ray of right ankle reviewed today, ordered during last visit on 11/26 due to acute right ankle pain and reported history of pathological fractures.  X-ray negative for acute abnormalities or fracture, no further intervention needed.  Patient reports improvement in pain since last visit.  She was also treated with Diflucan for yeast infection, reporting improvement in vaginal itching and drainage. No further intervention required at this time.  No lab work available during visit today.. No other complaints during visit.  Will continue to monitor and treat of chronic conditions.  Progressing slowly with PT/OT.    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    Past Surgical History: Patient has a past surgical history that includes Cholecystectomy; Appendectomy; Tonsillectomy; Vaginal hysterectomy w/ anterior and posterior vaginal repair; Cataract extraction, bilateral (Bilateral); Hysterectomy; Eye surgery (Bilateral); Portacath placement (Right, 09/2016); Back surgery (1988); Back surgery (02/2013); Renal artery stent (Left, 07/19/2017); Small intestine surgery  (08/23/2016); and Sigmoidectomy (10/29/2019).    Social History: Patient reports that she has never smoked. She has never used smokeless tobacco. She reports that she does not drink alcohol or use drugs.    Family History: family history includes Heart attack in her father; Hypertension in her father; Pancreatic cancer in her mother.    Allergies: Patient is allergic to adhesive; penicillins; tramadol; avelox [moxifloxacin]; amoxil [amoxicillin]; aspridrox [aspirin, buffered]; codeine; keflex [cephalexin]; norvasc [amlodipine]; red dye; robitussin [guaifenesin]; sulfa (sulfonamide antibiotics); tylenol [acetaminophen]; and vicks vaporub [camphor-eucalyptus oil-menthol].    ROS  Constitutional: Negative for fever or fatigue, + hoarse voice   Eyes: Negative for blurred vision, double vision and discharge.   Respiratory: Negative for cough, shortness of breath and wheezing.    Cardiovascular: Negative for chest pain, palpitations, and leg swelling.   Gastrointestinal: Negative for abdominal pain, constipation, diarrhea, nausea and vomiting.   Genitourinary: Negative for dysuria, frequency and urgency.   Musculoskeletal:  + generalized weakness. Negative for back pain and myalgias, + right ankle pain, improved.   Skin: Negative for itching and rash.   Neurological: Negative for dizziness, speech change, and headaches.   Psychiatric/Behavioral: Negative for depression. The patient is not nervous/anxious.      PEx     Constitutional: Patient appears well-developed and in no distress   Head: Normocephalic and atraumatic.   Eyes: Pupils are equal, round, and reactive to light.   Neck: Normal range of motion. Neck supple.   Cardiovascular: Normal rate, regular rhythm and normal heart sounds.    Pulmonary/Chest: Effort normal and breath sounds are clear  Genitourinary:   indwelling Martinez  Abdominal: Soft. Bowel sounds are normal, +ostomy with brown stool noted  Musculoskeletal: Normal range of motion, + right ankle pain,  improved   Neurological: Alert and oriented to person, place, and time.   Psychiatric: Normal mood and affect. Behavior is normal, + hoarse voice.   Skin: Skin is warm and dry, + stage II pressure ulcer to coccyx, R U chest port a cath, Abdominal surgical incision present      Assessment and Plan:    Right ankle pain, improved  -11/26 initiate right ankle xray  -12/3 x-ray of right ankle negative for fracture or abnormality, no further interventions needed    Debility r/t recent sigmoidectomy/ARF  - Continue with PT/OT for gait training and strengthening and restoration of ADL's   - Encourage mobility, OOB in chair, and early ambulation as appropriate  - Fall precautions   - Monitor for bowel and bladder dysfunction  - Monitor for and prevent skin breakdown and pressure ulcers  - Pain control: Tylenol PRN  -12/3 Continue PT/OT    Peripheral vascular disease, unspecified  Remote Hx of MI   HTN with Hyperlipidemia, stable  -Continue Lisinopril, Coreg ( d/anabella during admit), pravastatin  -12/3 Continue current regimen    Acute urinary retention, ongoing  -Follow up with Urology as outpatient.   -Martinez re inserted at facility on 11/15 for retention  -12/13 continue indwelling Martinez    Continued    Colon necrosis, improved   S/P sigmoidectomy with ostomy placement 10/29/19  E. coli bacteremia, resolved  -Treated with Keflexand metronidazole per ID until 11/15/16    Hypophosphatemia, improved  Hypomagnesemia, improved  Hypokalemia, improved  -continue neutraphos, KCl replacement, Mg replacement    Right vocal cord paralysis with hoarse voice, ongoing  -ENT consulted to during admit, recommended a CT neck and chest with IV contrast once kidney function improved  -ST following    -Chloraseptic spray PRN sore throat    Multiple Wounds, ongoing  1. Stage II coccyx PU  2. Abdominal surgical incision  3.R chest wall port accessed  -Awaiting measurements from WC RN, will follow weekly    DLBCL (diffuse large B cell  lymphoma)  Anemia due to antineoplastic chemotherapy  Thrombocytopenia, stable  -Transfused pRBCs during admit  -Patient has RU chest wall port that is accessed upon admit to facility  -Follow up with hem onc as outpatient.    Slow transit constipation, ongoing  -Continue polyethylene glycol BID (home med)    Epilepsy, chronic, stable  MDD with recurrent episodes, ongoing  -Continue home mirtazapine 7.5mg HS.  -Continue phenobarb, Trileptal     Acquired hypothyroidism, stable  -Continue home levothyroxine 125 mcg before breakfast.    Vitamin D Deficiency, stable  -Continue Vitamin D 3 replacement + Os sukhdev 1500 mg QD    Vaginal Yeast infection, ongoing  -Treated with 4 day course of low dose Diflucan during admit> infection persists.   -Tx with Diflucan 150 mg PO x 1 dose today      Acute renal failure due to Acute tubular necrosis, resolved  DIC (disseminated intravascular coagulation), resolved    Outpatient MDs: Her primary care physician is Dr. Jose Torres. Her pain management specialist is Dr. Chirag Btaes. Her neurologist is Dr. Gamal Lock.       Future Appointments   Date Time Provider Department Center   12/4/2019  1:40 PM Chris Johnson MD [facility] G[facility]UR [facility]   12/6/2019  1:00 PM Carleen Estrada MD [facility] UROLOGY [place] [facility]   12/12/2019 10:00 AM Selam Mills MD [facility] MAREN [facility]   12/17/2019  9:40 AM Jose Valles MD [facility] FAM MED [place] [facility]   4/2/2020 11:00 AM CHAIR 07 [facility] CHEMO [facility]         Anat Dawkins NP          Patient note was created using MModal Dictation.  Any errors in syntax or even information may not have been identified and edited on initial review prior to signing this note.

## 2019-12-04 ENCOUNTER — OFFICE VISIT (OUTPATIENT)
Dept: SURGERY | Facility: CLINIC | Age: 84
End: 2019-12-04
Payer: MEDICARE

## 2019-12-04 VITALS
BODY MASS INDEX: 28.67 KG/M2 | HEIGHT: 57 IN | SYSTOLIC BLOOD PRESSURE: 183 MMHG | HEART RATE: 85 BPM | TEMPERATURE: 97 F | DIASTOLIC BLOOD PRESSURE: 88 MMHG

## 2019-12-04 DIAGNOSIS — Z90.49 S/P PARTIAL RESECTION OF COLON: Primary | ICD-10-CM

## 2019-12-04 PROCEDURE — 99213 OFFICE O/P EST LOW 20 MIN: CPT | Mod: PBBFAC,PO | Performed by: STUDENT IN AN ORGANIZED HEALTH CARE EDUCATION/TRAINING PROGRAM

## 2019-12-04 PROCEDURE — 99999 PR PBB SHADOW E&M-EST. PATIENT-LVL III: ICD-10-PCS | Mod: PBBFAC,,, | Performed by: STUDENT IN AN ORGANIZED HEALTH CARE EDUCATION/TRAINING PROGRAM

## 2019-12-04 PROCEDURE — 99024 PR POST-OP FOLLOW-UP VISIT: ICD-10-PCS | Mod: POP,,, | Performed by: STUDENT IN AN ORGANIZED HEALTH CARE EDUCATION/TRAINING PROGRAM

## 2019-12-04 PROCEDURE — 99024 POSTOP FOLLOW-UP VISIT: CPT | Mod: POP,,, | Performed by: STUDENT IN AN ORGANIZED HEALTH CARE EDUCATION/TRAINING PROGRAM

## 2019-12-04 PROCEDURE — 99999 PR PBB SHADOW E&M-EST. PATIENT-LVL III: CPT | Mod: PBBFAC,,, | Performed by: STUDENT IN AN ORGANIZED HEALTH CARE EDUCATION/TRAINING PROGRAM

## 2019-12-04 NOTE — PROGRESS NOTES
"[facility]  Post Op Visit    SUBJECTIVE:  The patient is a 84 y.o. y/o female 5 weeks s/p sigmoid colectomy with end colostomy 10/30/19. Patient overall doing quite well; her voice is slowly returning/stronger overall. Continues to work with speech therapy. Also working with PT 3x daily, although she notes she is still very weak and is unable to walk/get out of chair on own. Eating small amounts still secondary to poor appetite; however notes good ostomy function-empties ostomy bag with assistance once daily. Denies redness around or drainage from incisions. Staples removed today in clinic which she tolerated well. Overall feels pretty well; notes sharp quick pains which do not require pain medication to her lower abdomen from time to time. Attributes these to "her allergies".     OBJECTIVE:  Vitals:    12/04/19 1300   BP: (!) 183/88   Pulse: 85   Temp: 97.3 °F (36.3 °C)       GEN: female in NAD, sitting in wheelchair  ABD: soft, non-tender, non-distended. Midline incision well healed without erythema/drainage.  INCISIONS: healing well without signs of infection. Ostomy pink/healthy appearing. Stool in bag.      ASSESSMENT/PLAN:  Doing well s/p sigmoid colectomy with end colostomy 10/30/19.   Continue working with speech and physical therapy.   Continue diet as tolerated. Incorporate boost/ensure in diet as tolerated.  Follow-up in clinic in one month to assess progress.  All questions answered; patient and daughter are comfortable with the above follow-up plan.    Keyona Khan MD  General Surgery, PGY-IV          "

## 2019-12-04 NOTE — TELEPHONE ENCOUNTER
Patient update received per Sherine Badillo RN at [facility].    Clinical status:  Requires  a. Injections  b. IV  c. Nebulizers, 02 evaluation sats  d. Enteral feedings new or recent change  e. Care of new colostomy or teaching  f. Frequent suctioning, trach and /or vent needs  g. Frequent irrigation, replacement of urinary cath, complex suprapubic  h. Treatment stg 3/ or 4 pressure ulcers, complex wound care  i. Nursing evaluation due to unstable and complex medical condition  j. Nursing rehab teaching e.g. bowel and bladder training, adaptive care    PT/OT/ST    *Transfer mobility (eg, from bed to chair)   Independent (patient can perform an activity safely and independently without any devices, physical assistance, or supervision)  Modified independent (patient requires the use of an assistive device or extra time to complete an activity, but is otherwise independent)  Contact guard assistance (patient can perform an activity independently, but needs constant physical touch to steady or guide to ensure safe performance)  Supervision (patient can perform an activity, but supervision is provided to address safety concerns)  Minimum assistance (patient can perform most of an activity (ie, approximately 75%), but requires limited assistance from one person for safe completion)  Moderate assistance (patient can perform about half the effort of an activity, but requires extensive assistance from one or more persons for safe performance of the other half)  X    Maximum assistance (patient has difficulty performing an activity, but can offer some assistance (eg, approximately 25% of effort) and is dependent on one or more people to assist for safe completion)  Dependent or unable (patient is unable to perform an activity or depends on 2 or more people to complete it)    *Ambulation inside (eg, within room, hallways, to toilet) __10___Ft (WITH RW)  Independent  Modified independent  Contact guard  assistance  Supervision  X    Minimum assistance  Moderate assistance  Maximum assistance  Dependent or unable    *Ambulation outside (eg, getting in or out of buildings, walking on uneven surfaces) ______Ft  X     Not applicable  Independent  Modified independent  Contact guard assistance  Supervision  Minimum assistance  Moderate assistance  Maximum assistance  Dependent or unable    *Toileting self-management:   Independent  Modified independent  Contact guard assistance  Supervision  Minimum assistance  X     Moderate assistance  Maximum assistance  Dependent or unable    *Nourishment self-management (ie, ability to feed self)   Independent  Modified independent  X     Supervision  Minimum assistance        Moderate assistance  Maximum assistance  Dependent or unable    *Personal care self-management (eg, dressing, bathing, brushing teeth, washing hair)   Independent  Modified independent  Contact guard assistance  X     Supervision  Minimum assistance  Moderate assistance  Maximum assistance  Dependent or unable    Medication support (ie, preparing/taking all prescribed and over-the-counter medications reliably and safely, including correct dosage at correct times)   X    Not applicable  Independent  Modified independent         Supervision  Minimum assistance  Moderate assistance  Maximum assistance    ADL adaptive devices self-management (ie, ability to manage putting on and/or removing braces, splints, and other adaptive devices)   X     Not applicable  Independent  Modified independent  Supervision  Minimum assistance  Moderate assistance  Maximum assistance  Dependent or unable          For Extensions  - Unanticipated functional problems impacting safe completion of therapy  - Multiple comorbidities or frailty impairing functional improvement  - Cognitive impairment requiring additional intervention and education  - Communication impairment requiring additional intervention and education  - Assessment or  care unmanageable at lower level of care  - Expect brief to moderate stay extension.    X    IQ completed for extension    Criteria met    X     Criteria not met    X     SNF notified of IQ results      Barriers to progress:  increased anxiety; poor endurance      New treatments:  remeron added yesterday for depression      Reason for Extension:  Therapy wants pt to achieve sit to stand and transfers at min assist; and ambulate to 25ft      Projected length of stay/ expected discharge:  Unknown      Discharge Disposition:    Plan to discharge home (alone) with       Recommendations:   Patient does not meet [facility] Continued Stay criteria due to a complex nursing skill not being identified; and per Sherine, patient is not progressing towards pre-established goals or making minimal gains, and has plateaued.  Please consider continuing therapy at an alternative level of care.

## 2019-12-05 ENCOUNTER — DOCUMENTATION ONLY (OUTPATIENT)
Dept: HEPATOLOGY | Facility: HOSPITAL | Age: 84
End: 2019-12-05

## 2019-12-05 NOTE — PROGRESS NOTES
[facility]                                                                              Skilled Nursing Facility                                                                  Progress Note     Admit Date:  11/12/2019  JESSICA   Principal Problem:  S/P sepsis/JAS sigmoid colon resection  HPI obtained from patient interview and chart review     Visit Date: 12/5/2019    Chief Complaint:  Wound evaluation, general sx recs, PT/OT progression    HPI:   Ms. Garcia is an 84-year-old female with a past medical history of peripheral vascular disease, renovascular hypertension, renal artery stenosis status post left renal artery stent placement on 7/19/17, coronary artery disease with history of myocardial infarction, diffuse large B cell lymphoma, anemia, epilepsy, hypothyroidism, chronic hyponatremia, depression, lumbar and sacroiliac joint osteoarthritis with chronic low back pain and history of lumbar spine surgery in 2013, slow transit constipation, with hx of small bowel resection on 8/23/16. She presented to [facility] with c/o abdominal pain, difficulty urinating, and constipation for one day. In the emergency department, she was found to have acute kidney injury (BUN 27, creatinine 1.6, from 9 and 0.7 on 8/29/19), elevated transaminases ( and , from 16 and 11 on 8/29/19), and lactic acidosis (5.8). Contrast CT showed acute enteritis, sigmoid and rectal constipation, and some peritoneal free fluid in the pericolic gutters. She was tx with IVF, and multiple antibiotics. She ultimately suffered from septic shock, was intubated, had a dialysis catheter placed due to oliguria, had a transvenous pacer placed for AF with sinus pauses, and was followed do hyponatremia in light of epilepsy hx. She also developed metabolic encephalopathy post op. She is s/p sigmoid colectomy with end colostomy after  finding sigmoid necrosis on 10/29. A Gonzales catheter was placed on 11/5/19 for urinary retention and she will follow up with Urology. The gonzales was d/anabella prior to admit to [facility], but had to be reinserted on 11/15 due to continued retention. She was found to have right vocal cord paralysis as well and ENT was consulted, who recommended at CT scan to f/u once JAS resolved. Infectious Disease recommended finishing cephalexin, metronidazole, and fluconazole on 11/15/19. SNF recommended for further care after d/c.    Patient will be treated at [facility] with PT and OT to improve functional status and ability to perform ADLs.     Interval history:  No complaints during visit today.  Patient went to general surgery appointment yesterday on 12/03, staples to abdominal incision removed at that time, wound healing without difficulty, continuing with ostomy.  Wound evaluation completed today, abdominal incision healing, stage II coccyx pressure ulcer improving with aggressive wound care at facility.  [facility] RN to follow closely, this NP will follow weekly.  Patient is medically stable, but with multiple chronic conditions that could lend to guarded condition at times.  Will continue to monitor and treat of chronic conditions.  Progressing slowly with PT/OT.    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    Past Surgical History: Patient has a past surgical history that includes Cholecystectomy; Appendectomy; Tonsillectomy; Vaginal hysterectomy w/ anterior and posterior vaginal repair; Cataract extraction, bilateral (Bilateral); Hysterectomy; Eye surgery (Bilateral); Portacath placement (Right, 09/2016); Back surgery (1988); Back surgery (02/2013); Renal artery stent (Left, 07/19/2017); Small intestine surgery (08/23/2016); and  Sigmoidectomy (10/29/2019).    Social History: Patient reports that she has never smoked. She has never used smokeless tobacco. She reports that she does not drink alcohol or use drugs.    Family History: family history includes Heart attack in her father; Hypertension in her father; Pancreatic cancer in her mother.    Allergies: Patient is allergic to adhesive; penicillins; tramadol; avelox [moxifloxacin]; amoxil [amoxicillin]; aspridrox [aspirin, buffered]; codeine; keflex [cephalexin]; norvasc [amlodipine]; red dye; robitussin [guaifenesin]; sulfa (sulfonamide antibiotics); tylenol [acetaminophen]; and vicks vaporub [camphor-eucalyptus oil-menthol].    ROS  Constitutional: Negative for fever or fatigue, + hoarse voice   Eyes: Negative for blurred vision, double vision and discharge.   Respiratory: Negative for cough, shortness of breath and wheezing.    Cardiovascular: Negative for chest pain, palpitations, and leg swelling.   Gastrointestinal: Negative for abdominal pain, constipation, diarrhea, nausea and vomiting.   Genitourinary: Negative for dysuria, frequency and urgency.   Musculoskeletal:  + generalized weakness. Negative for back pain and myalgias, + right ankle pain, improved.   Skin: Negative for itching and rash.   Neurological: Negative for dizziness, speech change, and headaches.   Psychiatric/Behavioral: Negative for depression. The patient is not nervous/anxious.      PEx     Constitutional: Patient appears well-developed and in no distress   Head: Normocephalic and atraumatic.   Eyes: Pupils are equal, round, and reactive to light.   Neck: Normal range of motion. Neck supple.   Cardiovascular: Normal rate, regular rhythm and normal heart sounds.    Pulmonary/Chest: Effort normal and breath sounds are clear  Genitourinary:   indwelling Martinez  Abdominal: Soft. Bowel sounds are normal, +ostomy with brown stool noted  Musculoskeletal: Normal range of motion, + right ankle pain, improved    Neurological: Alert and oriented to person, place, and time.   Psychiatric: Normal mood and affect. Behavior is normal, + hoarse voice.   Skin: Skin is warm and dry, + stage II pressure ulcer to coccyx, R U chest port a cath, Abdominal surgical incision present    Wound evaluation completed on 12/05/2019:    Wound location:  Left lower quadrant abdominal surgical incision, healing  Date identified:  11/13/2019  Present on admit:  Yes  Appearance of wound:  100 % epithelial  Drainage characteristic: None  Wound length:  16.5 cm   Wound width:  0.5 cm  Wound depth:  0.1 cm   Alie wound area: normal skin tone   Following: This NP weekly- last observed on (12/5/19), wound care RN weekly- last observed on (12/5/19)    Wound location:  Stage II pressure ulcer to coccyx, improving  Date identified:  11/13/2019  Present on admit:  Yes  Appearance of wound:  50% epithelial/50% granulation  Drainage characteristic: None  Wound length:  3.9 cm   Wound width:  5.8 cm  Wound depth: cm   Alie wound area:  Irregular edges, scar tissue, normal skin tone   Following: This NP weekly- last observed on (12/5/19), wound care RN weekly- last observed on (12/5/19)    Assessment and Plan:    Debility r/t recent sigmoidectomy/ARF  - Continue with PT/OT for gait training and strengthening and restoration of ADL's   - Encourage mobility, OOB in chair, and early ambulation as appropriate  - Fall precautions   - Monitor for bowel and bladder dysfunction  - Monitor for and prevent skin breakdown and pressure ulcers  - Pain control: Tylenol PRN  -12/5 Continue PT/OT    Peripheral vascular disease, unspecified  Remote Hx of MI   HTN with Hyperlipidemia, stable  -Continue Lisinopril, Coreg ( d/anabella during admit), pravastatin  -12/5 Continue current regimen    Urinary retention, ongoing  -Follow up with Urology as outpatient.   -Martinez re inserted at facility on 11/15 for retention  -12/5 continue indwelling Martinez, next urology appt 12/6, will await  recs    Right vocal cord paralysis with hoarse voice, ongoing  -ENT consulted to during admit, recommended a CT neck and chest with IV contrast once kidney function improved  -ST following    -Chloraseptic spray PRN sore throat  -12/5 Cont ST    Multiple Wounds, ongoing  1. Stage II coccyx PU  2. Abdominal surgical incision-staples removed, healing  3.R chest wall port accessed  -  RN following closely, will follow weekly  -12/5 wound assessment completed, continue with current orders    Colon necrosis, improved   S/P sigmoidectomy with ostomy placement 10/29/19  E. coli bacteremia, resolved  -Treated with Keflexand metronidazole per ID until 11/15/16  -12/5 Gen sx appt on 12/4-staples removed to abd incision, no other changes in plan of care     Continued    Right ankle pain, improved  -11/26 initiate right ankle xray  -12/3 x-ray of right ankle negative for fracture or abnormality, no further interventions needed    Hypophosphatemia, improved  Hypomagnesemia, improved  Hypokalemia, improved  -continue neutraphos, KCl replacement, Mg replacement    DLBCL (diffuse large B cell lymphoma)  Anemia due to antineoplastic chemotherapy  Thrombocytopenia, stable  -Transfused pRBCs during admit  -Patient has RU chest wall port that is accessed upon admit to facility  -Follow up with hem onc as outpatient.    Slow transit constipation, ongoing  -Continue polyethylene glycol BID (home med)    Epilepsy, chronic, stable  MDD with recurrent episodes, ongoing  -Continue home mirtazapine 7.5mg HS.  -Continue phenobarb, Trileptal     Acquired hypothyroidism, stable  -Continue home levothyroxine 125 mcg before breakfast.    Vitamin D Deficiency, stable  -Continue Vitamin D 3 replacement + Os sukhdev 1500 mg QD        Vaginal Yeast infection, ongoing resolved  -Treated with 4 day course of low dose Diflucan during admit> infection persists.   -Tx with Diflucan 150 mg PO x 1 dose today      Acute renal failure due to Acute tubular  necrosis, resolved  DIC (disseminated intravascular coagulation), resolved    Outpatient MDs: Her primary care physician is Dr. Jose Torres. Her pain management specialist is Dr. Chirag Bates. Her neurologist is Dr. Gamal Lock.       Future Appointments   Date Time Provider Department Center   12/6/2019  1:00 PM Carleen Estrada MD [facility]   12/12/2019 10:00 AM Selam Mills MD [facility]   12/17/2019  9:40 AM Jose Valles MD [facility]   1/3/2020  1:20 PM Chris Johnson MD [facility]UR [facility]   4/2/2020 11:00 AM CHAIR 07 [facility]         Anat Dawkins NP          Patient note was created using MModal Dictation.  Any errors in syntax or even information may not have been identified and edited on initial review prior to signing this note.

## 2019-12-06 ENCOUNTER — OFFICE VISIT (OUTPATIENT)
Dept: UROLOGY | Facility: CLINIC | Age: 84
End: 2019-12-06
Payer: MEDICARE

## 2019-12-06 ENCOUNTER — TELEPHONE (OUTPATIENT)
Dept: CARDIOLOGY | Facility: CLINIC | Age: 84
End: 2019-12-06

## 2019-12-06 VITALS
WEIGHT: 132 LBS | DIASTOLIC BLOOD PRESSURE: 87 MMHG | HEART RATE: 96 BPM | HEIGHT: 57 IN | BODY MASS INDEX: 28.48 KG/M2 | TEMPERATURE: 99 F | SYSTOLIC BLOOD PRESSURE: 176 MMHG

## 2019-12-06 DIAGNOSIS — R33.8 ACUTE URINARY RETENTION: Primary | ICD-10-CM

## 2019-12-06 DIAGNOSIS — K59.00 CONSTIPATION, UNSPECIFIED CONSTIPATION TYPE: ICD-10-CM

## 2019-12-06 DIAGNOSIS — Z74.09 POOR MOBILITY: ICD-10-CM

## 2019-12-06 PROBLEM — Z90.49 S/P PARTIAL RESECTION OF COLON: Status: ACTIVE | Noted: 2019-12-06

## 2019-12-06 PROCEDURE — 99204 OFFICE O/P NEW MOD 45 MIN: CPT | Mod: S$PBB,25,, | Performed by: STUDENT IN AN ORGANIZED HEALTH CARE EDUCATION/TRAINING PROGRAM

## 2019-12-06 PROCEDURE — 51702 INSERT TEMP BLADDER CATH: CPT | Mod: S$PBB,,, | Performed by: STUDENT IN AN ORGANIZED HEALTH CARE EDUCATION/TRAINING PROGRAM

## 2019-12-06 PROCEDURE — 87186 SC STD MICRODIL/AGAR DIL: CPT

## 2019-12-06 PROCEDURE — 51702 INSERT TEMP BLADDER CATH: CPT | Mod: PBBFAC,PO | Performed by: STUDENT IN AN ORGANIZED HEALTH CARE EDUCATION/TRAINING PROGRAM

## 2019-12-06 PROCEDURE — 87086 URINE CULTURE/COLONY COUNT: CPT

## 2019-12-06 PROCEDURE — 99214 OFFICE O/P EST MOD 30 MIN: CPT | Mod: PBBFAC,PO,25 | Performed by: STUDENT IN AN ORGANIZED HEALTH CARE EDUCATION/TRAINING PROGRAM

## 2019-12-06 PROCEDURE — 1159F MED LIST DOCD IN RCRD: CPT | Mod: ,,, | Performed by: STUDENT IN AN ORGANIZED HEALTH CARE EDUCATION/TRAINING PROGRAM

## 2019-12-06 PROCEDURE — 1125F AMNT PAIN NOTED PAIN PRSNT: CPT | Mod: ,,, | Performed by: STUDENT IN AN ORGANIZED HEALTH CARE EDUCATION/TRAINING PROGRAM

## 2019-12-06 PROCEDURE — 1125F PR PAIN SEVERITY QUANTIFIED, PAIN PRESENT: ICD-10-PCS | Mod: ,,, | Performed by: STUDENT IN AN ORGANIZED HEALTH CARE EDUCATION/TRAINING PROGRAM

## 2019-12-06 PROCEDURE — 87088 URINE BACTERIA CULTURE: CPT

## 2019-12-06 PROCEDURE — 1159F PR MEDICATION LIST DOCUMENTED IN MEDICAL RECORD: ICD-10-PCS | Mod: ,,, | Performed by: STUDENT IN AN ORGANIZED HEALTH CARE EDUCATION/TRAINING PROGRAM

## 2019-12-06 PROCEDURE — 99999 PR PBB SHADOW E&M-EST. PATIENT-LVL IV: ICD-10-PCS | Mod: PBBFAC,,, | Performed by: STUDENT IN AN ORGANIZED HEALTH CARE EDUCATION/TRAINING PROGRAM

## 2019-12-06 PROCEDURE — 87077 CULTURE AEROBIC IDENTIFY: CPT

## 2019-12-06 PROCEDURE — 51702 PR INSERTION OF TEMPORARY INDWELLING BLADDER CATHETER, SIMPLE: ICD-10-PCS | Mod: S$PBB,,, | Performed by: STUDENT IN AN ORGANIZED HEALTH CARE EDUCATION/TRAINING PROGRAM

## 2019-12-06 PROCEDURE — 99204 PR OFFICE/OUTPT VISIT, NEW, LEVL IV, 45-59 MIN: ICD-10-PCS | Mod: S$PBB,25,, | Performed by: STUDENT IN AN ORGANIZED HEALTH CARE EDUCATION/TRAINING PROGRAM

## 2019-12-06 PROCEDURE — 99999 PR PBB SHADOW E&M-EST. PATIENT-LVL IV: CPT | Mod: PBBFAC,,, | Performed by: STUDENT IN AN ORGANIZED HEALTH CARE EDUCATION/TRAINING PROGRAM

## 2019-12-06 NOTE — TELEPHONE ENCOUNTER
----- Message from Ambreen Bernabe sent at 12/6/2019  2:32 PM CST -----  Contact:  [facility] marianela - [phone]  Needs a sooner appointment. Please advise

## 2019-12-06 NOTE — PATIENT INSTRUCTIONS
Fluconazole 100mg oral daily for 7 days  Will send urine for culture, if positive, will call in antibiotics  Return to clinic for possible voiding trial. Ideally would be more mobile, adequately hydrated, and having a bowel movement daily (not constipated.

## 2019-12-06 NOTE — PROGRESS NOTES
Subjective:       Patient ID: Annette Garcia is a 84 y.o. female.    Chief Complaint: Other (voiding trial)  This is a 84 y.o.  female patient that is new to me.  The patient is referred to me by by [facility] hospitalist for a voiding trial. 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 Since that time she has undergone a long and arduous prolonged recovery process.      Lab Results   Component Value Date    CREATININE 0.6 11/22/2019       ---  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    In the setting of septic shock and near death    Petit mal epilepsy 1954    Scoliosis of lumbar spine     Seizures     Unspecified hypothyroidism        Past Surgical History:   Procedure Laterality Date    APPENDECTOMY      BACK SURGERY  1988    vertebral fracture    BACK SURGERY  02/2013    lumbar L2-5    CATARACT EXTRACTION, BILATERAL Bilateral     CHOLECYSTECTOMY      open    EYE SURGERY Bilateral     cataract removal with lens implant    HYSTERECTOMY      PORTACATH PLACEMENT Right 09/2016    RENAL ARTERY STENT Left 07/19/2017    SIGMOIDECTOMY  10/29/2019    SMALL INTESTINE SURGERY  08/23/2016    TONSILLECTOMY      VAGINAL HYSTERECTOMY W/ ANTERIOR AND POSTERIOR VAGINAL REPAIR         Family History   Problem Relation Age of Onset    Pancreatic cancer Mother     Hypertension Father     Heart attack Father        Social History     Tobacco Use    Smoking status: Never Smoker    Smokeless tobacco: Never Used   Substance Use Topics    Alcohol use: No    Drug use: No       Current Outpatient Medications on File Prior to Visit   Medication Sig Dispense Refill    acetaminophen (TYLENOL) 325 MG tablet Take 2 tablets (650 mg total) by  mouth every 4 (four) hours as needed for Pain.  0    calcium carbonate (OS-RICHARDSON) 600 mg calcium (1,500 mg) Tab Take 0.5 tablets (300 mg total) by mouth once daily.  0    levothyroxine (SYNTHROID) 125 MCG tablet TAKE 1 TABLET (125 MCG TOTAL) BY MOUTH ONCE DAILY. 90 tablet 3    lisinopril (PRINIVIL,ZESTRIL) 40 MG tablet Take 1 tablet (40 mg total) by mouth once daily. 90 tablet 3    magnesium 250 mg Tab Take 1 tablet (250 mg total) by mouth once daily.  0    mirtazapine (REMERON) 7.5 MG Tab Take 1 tablet (7.5 mg total) by mouth every evening. 30 tablet 11    ondansetron (ZOFRAN) 4 MG tablet Take 1 tablet (4 mg total) by mouth every 8 (eight) hours as needed for Nausea. 25 tablet 2    OXcarbazepine (TRILEPTAL) 300 MG Tab Take 1 tablet (300 mg total) by mouth nightly. 90 tablet 3    PHENobarbital (LUMINAL) 64.8 MG tablet Take 1 tablet (64.8 mg total) by mouth every evening. 90 tablet 5    polyethylene glycol (GLYCOLAX) 17 gram PwPk Take 17 g by mouth 2 (two) times daily as needed (Constipation).  0    pravastatin (PRAVACHOL) 20 MG tablet Take 1 tablet (20 mg total) by mouth once daily. 90 tablet 3    vitamin D (VITAMIN D3) 1000 units Tab Take 1,000 Units by mouth once daily.      cloNIDine (CATAPRES) 0.2 MG tablet Take 1 tablet (0.2 mg total) by mouth 2 (two) times daily. 180 tablet 3     No current facility-administered medications on file prior to visit.        Review of patient's allergies indicates:   Allergen Reactions    Adhesive Itching and Blisters    Penicillins Anaphylaxis    Tramadol Hives    Avelox [moxifloxacin] Rash     Facial and arm itching and redness. Pt states throat closes when given.    Amoxil [amoxicillin]     Aspridrox [aspirin, buffered]     Codeine Other (See Comments)     Throat swelling    Keflex [cephalexin]      Tolerated cefepime and cefazolin    Norvasc [amlodipine]     Red dye Hives    Robitussin [guaifenesin]     Sulfa (sulfonamide antibiotics)     Tylenol  [acetaminophen]      Has reaction to Tylenol with red dye and unable to take Extra Strength Tylenol/ CAN ONLY TOLERATE REG STRENGTH TYLENOL    Vicks vaporub [camphor-eucalyptus oil-menthol]        Review of Systems   Constitutional: Negative for activity change.   HENT: Negative for congestion.    Eyes: Negative for visual disturbance.   Respiratory: Negative for shortness of breath.    Cardiovascular: Negative for chest pain.   Gastrointestinal: Negative for abdominal distention.   Musculoskeletal: Negative for gait problem.   Skin: Negative for color change.   Neurological: Negative for dizziness.   Psychiatric/Behavioral: Negative for agitation.       Objective:      Physical Exam   Constitutional: She is oriented to person, place, and time. She appears well-developed.   HENT:   Head: Normocephalic and atraumatic.   Eyes: EOM are normal.   Neck: Normal range of motion.   Cardiovascular: Intact distal pulses.   Pulmonary/Chest: Effort normal.   Abdominal: Soft. She exhibits no distension. There is no tenderness.   Genitourinary:   Genitourinary Comments: Vaginal tenderness, labia is red and painful to touch  16 Malaysian gonzales exchanged using sterile technique, urine collected   Musculoskeletal: Normal range of motion.   Neurological: She is alert and oriented to person, place, and time.   Skin: Skin is warm and dry.   Psychiatric: She has a normal mood and affect.       Assessment:       1. Acute urinary retention    2. Constipation, unspecified constipation type    3. Poor mobility        Plan:       1. Patient is not optimized for a voiding trial currently - poor ambulatory status, dehydrated (only 1.5 glass of water all day today). Also it is Friday at 1pm.  2. Changed 16 Malaysian gonzales today.  3. Fluconazole x 100mg daily - history of yeast infection  4. Catheterized urine for culture.  5. RTC within 1 month in AM to reassess for voiding trial.        Acute urinary retention    Constipation, unspecified  constipation type    Poor mobility

## 2019-12-09 LAB — BACTERIA UR CULT: ABNORMAL

## 2019-12-10 ENCOUNTER — DOCUMENTATION ONLY (OUTPATIENT)
Dept: HEPATOLOGY | Facility: HOSPITAL | Age: 84
End: 2019-12-10

## 2019-12-10 NOTE — PROGRESS NOTES
[facility]                                                                              Skilled Nursing Facility                                                                  Progress Note     Admit Date:  11/12/2019  JESSICA   Principal Problem:  S/P sepsis/JAS sigmoid colon resection  HPI obtained from patient interview and chart review     Visit Date: 12/10/2019    Chief Complaint:  Lab review, Urology recommendations/changes to plan of care, PT/OT progression    HPI:   Ms. Garcia is an 84-year-old female with a past medical history of peripheral vascular disease, renovascular hypertension, renal artery stenosis status post left renal artery stent placement on 7/19/17, coronary artery disease with history of myocardial infarction, diffuse large B cell lymphoma, anemia, epilepsy, hypothyroidism, chronic hyponatremia, depression, lumbar and sacroiliac joint osteoarthritis with chronic low back pain and history of lumbar spine surgery in 2013, slow transit constipation, with hx of small bowel resection on 8/23/16. She presented to [facility] with c/o abdominal pain, difficulty urinating, and constipation for one day. In the emergency department, she was found to have acute kidney injury (BUN 27, creatinine 1.6, from 9 and 0.7 on 8/29/19), elevated transaminases ( and , from 16 and 11 on 8/29/19), and lactic acidosis (5.8). Contrast CT showed acute enteritis, sigmoid and rectal constipation, and some peritoneal free fluid in the pericolic gutters. She was tx with IVF, and multiple antibiotics. She ultimately suffered from septic shock, was intubated, had a dialysis catheter placed due to oliguria, had a transvenous pacer placed for AF with sinus pauses, and was followed do hyponatremia in light of epilepsy hx. She also developed metabolic encephalopathy post op. She is s/p sigmoid  colectomy with end colostomy after finding sigmoid necrosis on 10/29. A Gonzales catheter was placed on 11/5/19 for urinary retention and she will follow up with Urology. The gonzales was d/anabella prior to admit to [facility], but had to be reinserted on 11/15 due to continued retention. She was found to have right vocal cord paralysis as well and ENT was consulted, who recommended at CT scan to f/u once JAS resolved. Infectious Disease recommended finishing cephalexin, metronidazole, and fluconazole on 11/15/19. SNF recommended for further care after d/c.    Patient will be treated at [facility] SNF with PT and OT to improve functional status and ability to perform ADLs.     Interval history:  Lab work from today reviewed, H&H 9/28, BUN/CR 20/0.56,  (132),- new hyponatremia, albumin 2.9-new hypoalbuminemia, no ton any supplementation. Patient went to urology appt on 12/6, gonzales exchanged during visit, UC also collected-pending results from MDs office when available. Patient started on Diflucan again for persistent yeast infection issues x 7 days. Patient has no complaints during visit. Continues to work with ST for vocal cord paralysis, hoarse voice.  Patient is medically stable, but with multiple chronic conditions that could lend to guarded condition at times.  Will continue to monitor and treat of chronic conditions.  Progressing slowly with PT/OT.    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    Past Surgical History: Patient has a past surgical history that includes Cholecystectomy; Appendectomy; Tonsillectomy; Vaginal hysterectomy w/ anterior and posterior vaginal repair; Cataract extraction, bilateral (Bilateral); Hysterectomy; Eye surgery (Bilateral); Portacath placement (Right, 09/2016); Back surgery (1988); Back surgery  (02/2013); Renal artery stent (Left, 07/19/2017); Small intestine surgery (08/23/2016); and Sigmoidectomy (10/29/2019).    Social History: Patient reports that she has never smoked. She has never used smokeless tobacco. She reports that she does not drink alcohol or use drugs.    Family History: family history includes Heart attack in her father; Hypertension in her father; Pancreatic cancer in her mother.    Allergies: Patient is allergic to adhesive; penicillins; tramadol; avelox [moxifloxacin]; amoxil [amoxicillin]; aspridrox [aspirin, buffered]; codeine; keflex [cephalexin]; norvasc [amlodipine]; red dye; robitussin [guaifenesin]; sulfa (sulfonamide antibiotics); tylenol [acetaminophen]; and vicks vaporub [camphor-eucalyptus oil-menthol].    ROS  Constitutional: Negative for fever or fatigue, + hoarse voice   Eyes: Negative for blurred vision, double vision and discharge.   Respiratory: Negative for cough, shortness of breath and wheezing.    Cardiovascular: Negative for chest pain, palpitations, and leg swelling.   Gastrointestinal: Negative for abdominal pain, constipation, diarrhea, nausea and vomiting.   Genitourinary: Negative for dysuria, frequency and urgency.   Musculoskeletal:  + generalized weakness. Negative for back pain and myalgias, + right ankle pain, improved.   Skin: Negative for itching and rash.   Neurological: Negative for dizziness, speech change, and headaches.   Psychiatric/Behavioral: Negative for depression. The patient is not nervous/anxious.      PEx     Constitutional: Patient appears well-developed and in no distress   Head: Normocephalic and atraumatic.   Eyes: Pupils are equal, round, and reactive to light.   Neck: Normal range of motion. Neck supple.   Cardiovascular: Normal rate, regular rhythm and normal heart sounds.    Pulmonary/Chest: Effort normal and breath sounds are clear  Genitourinary:   indwelling Martinez  Abdominal: Soft. Bowel sounds are normal, +ostomy with brown  stool noted  Musculoskeletal: Normal range of motion, + right ankle pain, improved   Neurological: Alert and oriented to person, place, and time.   Psychiatric: Normal mood and affect. Behavior is normal, + hoarse voice.   Skin: Skin is warm and dry, + stage II pressure ulcer to coccyx, R U chest port a cath-not accessed, Abdominal surgical incision present    Wound evaluation completed on 12/05/2019:    Wound location:  Left lower quadrant abdominal surgical incision, healing  Date identified:  11/13/2019  Present on admit:  Yes  Appearance of wound:  100 % epithelial  Drainage characteristic: None  Wound length:  16.5 cm   Wound width:  0.5 cm  Wound depth:  0.1 cm   Alie wound area: normal skin tone   Following: This NP weekly- last observed on (12/5/19), wound care RN weekly- last observed on (12/5/19)    Wound location:  Stage II pressure ulcer to coccyx, improving  Date identified:  11/13/2019  Present on admit:  Yes  Appearance of wound:  50% epithelial/50% granulation  Drainage characteristic: None  Wound length:  3.9 cm   Wound width:  5.8 cm  Wound depth: cm   Alie wound area:  Irregular edges, scar tissue, normal skin tone   Following: This NP weekly- last observed on (12/5/19), wound care RN weekly- last observed on (12/5/19)    Assessment and Plan:    Vaginal Yeast infection, ongoing  -Treated with 4 day course of low dose Diflucan during admit> infection persists.   -Tx with Diflucan 150 mg PO x 1 dose today  -12/10 Urology appt on 12/6-Diflucan 100 mg PO QD x 7 days prescribed for persistent yeast infection, UC obtained during visit-awaiting results, gonzales exchanged in office    Debility r/t recent sigmoidectomy/ARF  - Continue with PT/OT for gait training and strengthening and restoration of ADL's   - Encourage mobility, OOB in chair, and early ambulation as appropriate  - Fall precautions   - Monitor for bowel and bladder dysfunction  - Monitor for and prevent skin breakdown and pressure ulcers  -  Pain control: Tylenol PRN  -12/10 Continue PT/OT    Peripheral vascular disease, unspecified, chronic  Remote Hx of MI   HTN with Hyperlipidemia, stable  -Continue Lisinopril, Coreg ( d/anabelal during admit), pravastatin  -12/10 Continue current regimen    Urinary retention, ongoing  -Follow up with Urology as outpatient.   -Martinez re inserted at facility on 11/15 for retention  -12/10 continue indwelling Martinez, changed on 12/6 during office appt    Right vocal cord paralysis with hoarse voice, ongoing  -ENT consulted to during admit, recommended a CT neck and chest with IV contrast once kidney function improved  -ST following    -Chloraseptic spray PRN sore throat  -12/10 Cont ST     Hypoalbuminemia, new  Hyponatremia, new  Hypophosphatemia, improved  Hypomagnesemia, improved  Hypokalemia, improved  -continue neutraphos, KCl replacement, Mg replacement  -12/10 initiate prostat 30 ml PO BID, monitor labwork    Continued    Colon necrosis, improved   S/P sigmoidectomy with ostomy placement 10/29/19  E. coli bacteremia, resolved  -Treated with Keflexand metronidazole per ID until 11/15/16  -12/5 Gen sx appt on 12/4-staples removed to abd incision, no other changes in plan of care     Multiple Wounds, ongoing  1. Stage II coccyx PU  2. Abdominal surgical incision-staples removed, healing  3.R chest wall port accessed  -  RN following closely, will follow weekly  -12/5 wound assessment completed, continue with current orders    Right ankle pain, improved  -11/26 initiate right ankle xray  -12/3 x-ray of right ankle negative for fracture or abnormality, no further interventions needed    DLBCL (diffuse large B cell lymphoma)  Anemia due to antineoplastic chemotherapy  Thrombocytopenia, stable  -Transfused pRBCs during admit  -Patient has RU chest wall port that is accessed upon admit to facility  -Follow up with hem onc as outpatient.    Slow transit constipation, ongoing  -Continue polyethylene glycol BID (home  med)    Epilepsy, chronic, stable  MDD with recurrent episodes, ongoing  -Continue home mirtazapine 7.5mg HS.  -Continue phenobarb, Trileptal     Acquired hypothyroidism, stable  -Continue home levothyroxine 125 mcg before breakfast.    Vitamin D Deficiency, stable  -Continue Vitamin D 3 replacement + Os sukhdev 1500 mg QD        Acute renal failure due to Acute tubular necrosis, resolved  DIC (disseminated intravascular coagulation), resolved    Outpatient MDs: Her primary care physician is Dr. Jose Torres. Her pain management specialist is Dr. Chirag Bates. Her neurologist is Dr. Gamal Lock.       Future Appointments   Date Time Provider Department Center   12/12/2019 10:00 AM Selam Mills MD [facility] MAREN [facility]   12/17/2019  3:00 PM Timmy Simmons MD [facility] CARDIO [facility]   1/3/2020  1:20 PM Chris Johnson MD [facility] GENSUR [facility]   1/10/2020 10:00 AM Carleen Estrada MD [facility] UROLOGY [facility]   4/2/2020 11:00 AM CHAIR [facility] CHEMO [facility]         Anat Dawkins NP          Patient note was created using MModal Dictation.  Any errors in syntax or even information may not have been identified and edited on initial review prior to signing this note.

## 2019-12-12 ENCOUNTER — OFFICE VISIT (OUTPATIENT)
Dept: OTOLARYNGOLOGY | Facility: CLINIC | Age: 84
End: 2019-12-12
Payer: MEDICARE

## 2019-12-12 VITALS
BODY MASS INDEX: 28.56 KG/M2 | TEMPERATURE: 97 F | DIASTOLIC BLOOD PRESSURE: 84 MMHG | HEART RATE: 91 BPM | HEIGHT: 57 IN | SYSTOLIC BLOOD PRESSURE: 140 MMHG

## 2019-12-12 DIAGNOSIS — J38.7 PRESBYLARYNX: Primary | ICD-10-CM

## 2019-12-12 DIAGNOSIS — Z87.09 HISTORY OF VOCAL CORD PARALYSIS: ICD-10-CM

## 2019-12-12 PROCEDURE — 31575 PR LARYNGOSCOPY, FLEXIBLE; DIAGNOSTIC: ICD-10-PCS | Mod: S$PBB,,, | Performed by: OTOLARYNGOLOGY

## 2019-12-12 PROCEDURE — 1159F MED LIST DOCD IN RCRD: CPT | Mod: ,,, | Performed by: OTOLARYNGOLOGY

## 2019-12-12 PROCEDURE — 99213 PR OFFICE/OUTPT VISIT, EST, LEVL III, 20-29 MIN: ICD-10-PCS | Mod: 25,S$PBB,, | Performed by: OTOLARYNGOLOGY

## 2019-12-12 PROCEDURE — 99213 OFFICE O/P EST LOW 20 MIN: CPT | Mod: 25,S$PBB,, | Performed by: OTOLARYNGOLOGY

## 2019-12-12 PROCEDURE — 99999 PR PBB SHADOW E&M-EST. PATIENT-LVL III: ICD-10-PCS | Mod: PBBFAC,,, | Performed by: OTOLARYNGOLOGY

## 2019-12-12 PROCEDURE — 1159F PR MEDICATION LIST DOCUMENTED IN MEDICAL RECORD: ICD-10-PCS | Mod: ,,, | Performed by: OTOLARYNGOLOGY

## 2019-12-12 PROCEDURE — 31575 DIAGNOSTIC LARYNGOSCOPY: CPT | Mod: PBBFAC,PN | Performed by: OTOLARYNGOLOGY

## 2019-12-12 PROCEDURE — 99213 OFFICE O/P EST LOW 20 MIN: CPT | Mod: PBBFAC,PN | Performed by: OTOLARYNGOLOGY

## 2019-12-12 PROCEDURE — 99999 PR PBB SHADOW E&M-EST. PATIENT-LVL III: CPT | Mod: PBBFAC,,, | Performed by: OTOLARYNGOLOGY

## 2019-12-12 PROCEDURE — 31575 DIAGNOSTIC LARYNGOSCOPY: CPT | Mod: S$PBB,,, | Performed by: OTOLARYNGOLOGY

## 2019-12-12 NOTE — PROGRESS NOTES
Otolaryngology Clinic Note    Annette Garcia  Encounter Date: 12/12/2019   YOB: 1935  Referring Physician: Aaarefmelony Self  No address on file   PCP: Jose Valles MD    Chief Complaint:   Chief Complaint   Patient presents with    Other     paralyzed vocal cords 10/27 started speech therapy 11/12       HPI: Annette Garcia is a 84 y.o. female here for hospital follow up. Seen 11/9 for right vocal cord paralysis. She had bee hospitalized with sepsis and SBO underwent colectomy 10/29/19. Only neck procedure was central line placement. Family did note her voice seemed better right after extubation and then worsened. On my initial evaluation she had a very breathy voice and cord was lateralized. She was eating well with no signs of aspiration at that time.    Since discharge she has been following with speech therapy. Her speech therapist Destiny is actually with her today. Her voice is back to normal. Had slowly been getting back to normal over the last few weeks. Eating and drinking well. No aspiration.    Review of Systems   Constitutional: Positive for fever and malaise/fatigue. Negative for chills.   HENT: Negative for congestion and sore throat.    Respiratory: Positive for shortness of breath. Negative for cough.    Gastrointestinal: Negative for heartburn, nausea and vomiting.        Review of patient's allergies indicates:   Allergen Reactions    Adhesive Itching and Blisters    Penicillins Anaphylaxis    Tramadol Hives    Avelox [moxifloxacin] Rash     Facial and arm itching and redness. Pt states throat closes when given.    Amoxil [amoxicillin]     Aspridrox [aspirin, buffered]     Codeine Other (See Comments)     Throat swelling    Keflex [cephalexin]      Tolerated cefepime and cefazolin    Norvasc [amlodipine]     Red dye Hives    Robitussin [guaifenesin]     Sulfa (sulfonamide antibiotics)     Tylenol [acetaminophen]      Has reaction to Tylenol with red dye and  unable to take Extra Strength Tylenol/ CAN ONLY TOLERATE REG STRENGTH TYLENOL    Vicks vaporub [camphor-eucalyptus oil-menthol]        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    In the setting of septic shock and near death    Petit mal epilepsy 1954    Scoliosis of lumbar spine     Seizures     Unspecified hypothyroidism        Past Surgical History:   Procedure Laterality Date    APPENDECTOMY      BACK SURGERY  1988    vertebral fracture    BACK SURGERY  02/2013    lumbar L2-5    CATARACT EXTRACTION, BILATERAL Bilateral     CHOLECYSTECTOMY      open    EYE SURGERY Bilateral     cataract removal with lens implant    HYSTERECTOMY      PORTACATH PLACEMENT Right 09/2016    RENAL ARTERY STENT Left 07/19/2017    SIGMOIDECTOMY  10/29/2019    SMALL INTESTINE SURGERY  08/23/2016    TONSILLECTOMY      VAGINAL HYSTERECTOMY W/ ANTERIOR AND POSTERIOR VAGINAL REPAIR         Social History     Socioeconomic History    Marital status:      Spouse name: Not on file    Number of children: Not on file    Years of education: Not on file    Highest education level: Not on file   Occupational History    Not on file   Social Needs    Financial resource strain: Not on file    Food insecurity:     Worry: Not on file     Inability: Not on file    Transportation needs:     Medical: Not on file     Non-medical: Not on file   Tobacco Use    Smoking status: Never Smoker    Smokeless tobacco: Never Used   Substance and Sexual Activity    Alcohol use: No    Drug use: No    Sexual activity: Not on file   Lifestyle    Physical activity:     Days per week: Not on file     Minutes per session: Not on file    Stress: Not on file   Relationships    Social connections:     Talks on phone: Not on file     Gets together: Not on file     Attends [employer] service: Not on file     Active member of club or organization: Not on file      Attends meetings of clubs or organizations: Not on file     Relationship status: Not on file   Other Topics Concern    Not on file   Social History Narrative    Not on file       Family History   Problem Relation Age of Onset    Pancreatic cancer Mother     Hypertension Father     Heart attack Father        Outpatient Encounter Medications as of 12/12/2019   Medication Sig Dispense Refill    acetaminophen (TYLENOL) 325 MG tablet Take 2 tablets (650 mg total) by mouth every 4 (four) hours as needed for Pain.  0    calcium carbonate (OS-RICHARDSON) 600 mg calcium (1,500 mg) Tab Take 0.5 tablets (300 mg total) by mouth once daily.  0    levothyroxine (SYNTHROID) 125 MCG tablet TAKE 1 TABLET (125 MCG TOTAL) BY MOUTH ONCE DAILY. 90 tablet 3    lisinopril (PRINIVIL,ZESTRIL) 40 MG tablet Take 1 tablet (40 mg total) by mouth once daily. 90 tablet 3    magnesium 250 mg Tab Take 1 tablet (250 mg total) by mouth once daily.  0    mirtazapine (REMERON) 7.5 MG Tab Take 1 tablet (7.5 mg total) by mouth every evening. 30 tablet 11    ondansetron (ZOFRAN) 4 MG tablet Take 1 tablet (4 mg total) by mouth every 8 (eight) hours as needed for Nausea. 25 tablet 2    OXcarbazepine (TRILEPTAL) 300 MG Tab Take 1 tablet (300 mg total) by mouth nightly. 90 tablet 3    PHENobarbital (LUMINAL) 64.8 MG tablet Take 1 tablet (64.8 mg total) by mouth every evening. 90 tablet 5    polyethylene glycol (GLYCOLAX) 17 gram PwPk Take 17 g by mouth 2 (two) times daily as needed (Constipation).  0    pravastatin (PRAVACHOL) 20 MG tablet Take 1 tablet (20 mg total) by mouth once daily. 90 tablet 3    vitamin D (VITAMIN D3) 1000 units Tab Take 1,000 Units by mouth once daily.      cloNIDine (CATAPRES) 0.2 MG tablet Take 1 tablet (0.2 mg total) by mouth 2 (two) times daily. 180 tablet 3     No facility-administered encounter medications on file as of 12/12/2019.        Physical Exam:    Vitals:    12/12/19 1011   BP: (!) 140/84   Pulse: 91   Temp:  97.1 °F (36.2 °C)       Constitutional  General Appearance: well nourished, well-developed, alert, oriented, in no acute distress  Communication: ability, understanding, voice quality normal - drastic improvement since last seen in hospital when voice was very breathy, it is strong today  Head and Face  Inspection: normocephalic, atraumatic, no scars, lesions or masses      Eyes  Ocular Motility / Alignment: normal alignment, motility, no proptosis, enophthalmus or nystagmus  Conjunctiva: not injected  Eyelids: no entropion or ectropion, no edema  Ears  Hearing: speech reception thresholds grossly normal  External Ears: no auricle lesions, non-tender, mobile to palpation  Otoscopy:  Right Ear: no tympanic membrane lesions, perforations, or effusion, normal EAC  Left Ear: no tympanic membrane lesions, perforations, or effusion, normal EAC  Nose  External Nose: no lesions, tenderness, trauma or deformity  Intranasal Exam: no edema, erythema, discharge, mass or obstruction  Oral Cavity / Oropharynx  Lips: upper and lower lips pink and moist  Gingiva: healthy  Oral Mucosa: moist, no mucosal lesions  Floor of Mouth: normal, no lesions  Tongue: moist, normal mobility, no lesions  Palate: soft and hard palates without lesions or ulcers  Oropharynx: tonsils and walls without erythema, exudate, lesions, fullness or obstruction  Neck  Inspection and Palpation: no erythema, induration, emphysema, tenderness or masses  Larynx and Trachea: normal position and mobility   Thyroid: no tenderness, enlargement or nodules  Submandibular Glands: no masses or tenderness  Lymphatic:  Anterior, Posterior, Submandibular, Submental, Supraclavicular: no lymphadenopathy present  Chest / Respiratory  Chest: no stridor or retractions, normal effort and expansion  Cardiovascular:  Pulses: 2+ radial pulses, regular rhythm and rate  Auscultation: deferred  Neurological  Cranial Nerves: grossly intact  General: no focal  deficits  Psychiatric  Orientation: oriented to time, place and person  Mood and Affect: no depression, anxiety or agitation  Extremities  Inspection: in wheelchair    Procedures:    Flexible Fiberoptic Laryngoscopy    Indications: h/o paralzed right vocal cord    Anesthesia: Topical xylocaine with nunu-synephrine    Complications: None    Findings: cord motion has returned, mild bowing of cords likely due to aging but good glottic closure    Procedure in Detail: After verbal consent obtained and risks, benefits and alternatives were discussed with the patient, nares were decongested and anesthetized, and flexible fiberoptic laryngoscope passed via right nare with following results:  - Nasal cavity: no septal deviation, no inferior turbinate hypertrophy, no mass or lesion  - Nasopharynx: no adenoid hypertrophy, no mass or lesion  - Oropharynx: no lingual tonsil asymmetry, no mass or lesion  - Hypopharynx: no mass or lesion  - Supraglottis: no mass or lesion, no significant interarytenoid edema or erythema  - Glottis: bilateral true vocal cords with normal mobility, no edema, no mass or lesion, slight bowing likely presbylarynx, good closure    Patient tolerated the procedure well    Pertinent Data:  ? LABS:   ? AUDIO:  ? PATH:      I personally reviewed the following pertinent data at today's visit:    Imaging:   ? XRAY:  ? Ultrasound:  ? CT Scan:  ? MRI Scan:  ? PET/CT Scan:    I personally reviewed the following images:    Summary of Outside Records:      Assessment and Plan:  Annette Garcia is a 84 y.o. female with     Presbylarynx    History of vocal cord paralysis       Unclear if cord paralysis was from central line placement, intubation or just idiopathic. Cord has normal mobility today. Speech therapist with her today who viewed flexible laryngoscopy. She is eating and drinking well. Slight bowing of cords likely age related. She is getting good closure. RTC as needed         RAMONE Monzon  MD [facility]

## 2019-12-12 NOTE — PROCEDURES
Flexible Fiberoptic Laryngoscopy    Indications: h/o paralzed right vocal cord    Anesthesia: Topical xylocaine with nunu-synephrine    Complications: None    Findings: cord motion has returned, mild bowing of cords likely due to aging but good glottic closure    Procedure in Detail: After verbal consent obtained and risks, benefits and alternatives were discussed with the patient, nares were decongested and anesthetized, and flexible fiberoptic laryngoscope passed via right nare with following results:  - Nasal cavity: no septal deviation, no inferior turbinate hypertrophy, no mass or lesion  - Nasopharynx: no adenoid hypertrophy, no mass or lesion  - Oropharynx: no lingual tonsil asymmetry, no mass or lesion  - Hypopharynx: no mass or lesion  - Supraglottis: no mass or lesion, no significant interarytenoid edema or erythema  - Glottis: bilateral true vocal cords with normal mobility, no edema, no mass or lesion, slight bowing likely presbylarynx, good closure    Patient tolerated the procedure well

## 2019-12-17 ENCOUNTER — DOCUMENTATION ONLY (OUTPATIENT)
Dept: HEPATOLOGY | Facility: HOSPITAL | Age: 84
End: 2019-12-17

## 2019-12-17 ENCOUNTER — OFFICE VISIT (OUTPATIENT)
Dept: CARDIOLOGY | Facility: CLINIC | Age: 84
End: 2019-12-17
Payer: MEDICARE

## 2019-12-17 VITALS — OXYGEN SATURATION: 91 % | HEART RATE: 100 BPM | DIASTOLIC BLOOD PRESSURE: 82 MMHG | SYSTOLIC BLOOD PRESSURE: 144 MMHG

## 2019-12-17 DIAGNOSIS — S22.080D CLOSED WEDGE COMPRESSION FRACTURE OF ELEVENTH THORACIC VERTEBRA WITH ROUTINE HEALING: ICD-10-CM

## 2019-12-17 DIAGNOSIS — I73.9 PERIPHERAL VASCULAR DISEASE, UNSPECIFIED: Chronic | ICD-10-CM

## 2019-12-17 DIAGNOSIS — Z90.49 S/P PARTIAL RESECTION OF COLON: ICD-10-CM

## 2019-12-17 DIAGNOSIS — Z98.890 HISTORY OF STENT INSERTION OF RENAL ARTERY: Chronic | ICD-10-CM

## 2019-12-17 DIAGNOSIS — E03.9 ACQUIRED HYPOTHYROIDISM: Chronic | ICD-10-CM

## 2019-12-17 DIAGNOSIS — C83.30 DIFFUSE LARGE B-CELL LYMPHOMA, UNSPECIFIED BODY REGION: Chronic | ICD-10-CM

## 2019-12-17 DIAGNOSIS — R78.81 E COLI BACTEREMIA: ICD-10-CM

## 2019-12-17 DIAGNOSIS — Z98.890 S/P EXPLORATORY LAPAROTOMY: ICD-10-CM

## 2019-12-17 DIAGNOSIS — I15.0 RENOVASCULAR HYPERTENSION: Chronic | ICD-10-CM

## 2019-12-17 DIAGNOSIS — K55.049: ICD-10-CM

## 2019-12-17 DIAGNOSIS — I48.0 PAROXYSMAL ATRIAL FIBRILLATION: ICD-10-CM

## 2019-12-17 DIAGNOSIS — R55 SYNCOPE AND COLLAPSE: Primary | ICD-10-CM

## 2019-12-17 DIAGNOSIS — I70.1 BILATERAL RENAL ARTERY STENOSIS: ICD-10-CM

## 2019-12-17 DIAGNOSIS — J38.01 PARALYSIS OF RIGHT VOCAL CORD: ICD-10-CM

## 2019-12-17 DIAGNOSIS — B96.20 E COLI BACTEREMIA: ICD-10-CM

## 2019-12-17 DIAGNOSIS — I45.5 SINUS PAUSE: ICD-10-CM

## 2019-12-17 DIAGNOSIS — F32.A DEPRESSION, UNSPECIFIED DEPRESSION TYPE: ICD-10-CM

## 2019-12-17 DIAGNOSIS — M47.816 OSTEOARTHRITIS OF LUMBAR SPINE, UNSPECIFIED SPINAL OSTEOARTHRITIS COMPLICATION STATUS: Chronic | ICD-10-CM

## 2019-12-17 DIAGNOSIS — I10 UNCONTROLLED HYPERTENSION: ICD-10-CM

## 2019-12-17 DIAGNOSIS — I25.2 OLD MI (MYOCARDIAL INFARCTION): Chronic | ICD-10-CM

## 2019-12-17 PROCEDURE — 99213 OFFICE O/P EST LOW 20 MIN: CPT | Mod: PBBFAC,PO | Performed by: INTERNAL MEDICINE

## 2019-12-17 PROCEDURE — 99214 PR OFFICE/OUTPT VISIT, EST, LEVL IV, 30-39 MIN: ICD-10-PCS | Mod: S$PBB,,, | Performed by: INTERNAL MEDICINE

## 2019-12-17 PROCEDURE — 1159F PR MEDICATION LIST DOCUMENTED IN MEDICAL RECORD: ICD-10-PCS | Mod: ,,, | Performed by: INTERNAL MEDICINE

## 2019-12-17 PROCEDURE — 99999 PR PBB SHADOW E&M-EST. PATIENT-LVL III: CPT | Mod: PBBFAC,,, | Performed by: INTERNAL MEDICINE

## 2019-12-17 PROCEDURE — 99214 OFFICE O/P EST MOD 30 MIN: CPT | Mod: S$PBB,,, | Performed by: INTERNAL MEDICINE

## 2019-12-17 PROCEDURE — 1126F PR PAIN SEVERITY QUANTIFIED, NO PAIN PRESENT: ICD-10-PCS | Mod: ,,, | Performed by: INTERNAL MEDICINE

## 2019-12-17 PROCEDURE — 1126F AMNT PAIN NOTED NONE PRSNT: CPT | Mod: ,,, | Performed by: INTERNAL MEDICINE

## 2019-12-17 PROCEDURE — 99999 PR PBB SHADOW E&M-EST. PATIENT-LVL III: ICD-10-PCS | Mod: PBBFAC,,, | Performed by: INTERNAL MEDICINE

## 2019-12-17 PROCEDURE — 1159F MED LIST DOCD IN RCRD: CPT | Mod: ,,, | Performed by: INTERNAL MEDICINE

## 2019-12-17 NOTE — PROGRESS NOTES
[facility]                                                                              Skilled Nursing Facility                                                                  Progress Note     Admit Date:  11/12/2019  JESSICA   Principal Problem:  S/P sepsis/JAS sigmoid colon resection  HPI obtained from patient interview and chart review     Visit Date: 12/17/2019    Chief Complaint:  BP/HR monitoring, re-evaluation of yeast infection, PT/OT progression    HPI:   Ms. Garcia is an 84-year-old female with a past medical history of peripheral vascular disease, renovascular hypertension, renal artery stenosis status post left renal artery stent placement on 7/19/17, coronary artery disease with history of myocardial infarction, diffuse large B cell lymphoma, anemia, epilepsy, hypothyroidism, chronic hyponatremia, depression, lumbar and sacroiliac joint osteoarthritis with chronic low back pain and history of lumbar spine surgery in 2013, slow transit constipation, with hx of small bowel resection on 8/23/16. She presented to [facility] with c/o abdominal pain, difficulty urinating, and constipation for one day. In the emergency department, she was found to have acute kidney injury (BUN 27, creatinine 1.6, from 9 and 0.7 on 8/29/19), elevated transaminases ( and , from 16 and 11 on 8/29/19), and lactic acidosis (5.8). Contrast CT showed acute enteritis, sigmoid and rectal constipation, and some peritoneal free fluid in the pericolic gutters. She was tx with IVF, and multiple antibiotics. She ultimately suffered from septic shock, was intubated, had a dialysis catheter placed due to oliguria, had a transvenous pacer placed for AF with sinus pauses, and was followed do hyponatremia in light of epilepsy hx. She also developed metabolic encephalopathy post op. She is s/p sigmoid colectomy with  end colostomy after finding sigmoid necrosis on 10/29. A Gonzales catheter was placed on 11/5/19 for urinary retention and she will follow up with Urology. The gonzales was d/anabella prior to admit to [facility], but had to be reinserted on 11/15 due to continued retention. She was found to have right vocal cord paralysis as well and ENT was consulted, who recommended at CT scan to f/u once JAS resolved. Infectious Disease recommended finishing cephalexin, metronidazole, and fluconazole on 11/15/19. SNF recommended for further care after d/c.    Patient will be treated at [facility] SNF with PT and OT to improve functional status and ability to perform ADLs.     Interval history:  No complaints during visit today.  Most recent /81, , mild tachycardia, will trend. Her voice quality is improving after recent right focal cord paralysis.  She has been followed by ENT as an outpatient.  Patient continues with ostomy as well as indwelling Gonzales catheter.  Diflucan has completed for treatment of yeast infection, symptoms improved. Patient is medically stable, but with multiple chronic conditions that could lend to guarded condition at times.  Will continue to monitor and treat of chronic conditions.  Progressing slowly with PT/OT.    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    Past Surgical History: Patient has a past surgical history that includes Cholecystectomy; Appendectomy; Tonsillectomy; Vaginal hysterectomy w/ anterior and posterior vaginal repair; Cataract extraction, bilateral (Bilateral); Hysterectomy; Eye surgery (Bilateral); Portacath placement (Right, 09/2016); Back surgery (1988); Back surgery (02/2013); Renal artery stent (Left, 07/19/2017); Small intestine surgery (08/23/2016); and Sigmoidectomy (10/29/2019).    Social  History: Patient reports that she has never smoked. She has never used smokeless tobacco. She reports that she does not drink alcohol or use drugs.    Family History: family history includes Heart attack in her father; Hypertension in her father; Pancreatic cancer in her mother.    Allergies: Patient is allergic to adhesive; penicillins; tramadol; avelox [moxifloxacin]; amoxil [amoxicillin]; aspridrox [aspirin, buffered]; codeine; keflex [cephalexin]; norvasc [amlodipine]; red dye; robitussin [guaifenesin]; sulfa (sulfonamide antibiotics); tylenol [acetaminophen]; and vicks vaporub [camphor-eucalyptus oil-menthol].    ROS  Constitutional: Negative for fever or fatigue, + hoarse voice , improving  Eyes: Negative for blurred vision, double vision and discharge.   Respiratory: Negative for cough, shortness of breath and wheezing.    Cardiovascular: Negative for chest pain, palpitations, and leg swelling.   Gastrointestinal: Negative for abdominal pain, constipation, diarrhea, nausea and vomiting.   Genitourinary: Negative for dysuria, frequency and urgency.   Musculoskeletal:  + generalized weakness. Negative for back pain and myalgias, + right ankle pain, improved.   Skin: Negative for itching and rash.   Neurological: Negative for dizziness, speech change, and headaches.   Psychiatric/Behavioral: Negative for depression. The patient is not nervous/anxious.      PEx     Constitutional: Patient appears well-developed and in no distress   Head: Normocephalic and atraumatic.   Eyes: Pupils are equal, round, and reactive to light.   Neck: Normal range of motion. Neck supple.   Cardiovascular: Normal rate, regular rhythm and normal heart sounds.    Pulmonary/Chest: Effort normal and breath sounds are clear  Genitourinary:   indwelling Martinez  Abdominal: Soft. Bowel sounds are normal, +ostomy with brown stool noted  Musculoskeletal: Normal range of motion, + right ankle pain, improved   Neurological: Alert and oriented to  person, place, and time.   Psychiatric: Normal mood and affect. Behavior is normal, + hoarse voice, improving.   Skin: Skin is warm and dry, + stage II pressure ulcer to coccyx, R U chest port a cath-not accessed, Abdominal surgical incision present    Wound evaluation completed on 12/05/2019:    Wound location:  Left lower quadrant abdominal surgical incision, healing  Date identified:  11/13/2019  Present on admit:  Yes  Appearance of wound:  100 % epithelial  Drainage characteristic: None  Wound length:  16.5 cm   Wound width:  0.5 cm  Wound depth:  0.1 cm   Alie wound area: normal skin tone   Following: This NP weekly- last observed on (12/5/19), wound care RN weekly- last observed on (12/5/19)    Wound location:  Stage II pressure ulcer to coccyx, improving  Date identified:  11/13/2019  Present on admit:  Yes  Appearance of wound:  50% epithelial/50% granulation  Drainage characteristic: None  Wound length:  3.9 cm   Wound width:  5.8 cm  Wound depth: cm   Alie wound area:  Irregular edges, scar tissue, normal skin tone   Following: This NP weekly- last observed on (12/5/19), wound care RN weekly- last observed on (12/5/19)    Assessment and Plan:    Vaginal Yeast infection, improved  -Treated with 4 day course of low dose Diflucan during admit> infection persists.   -Tx with Diflucan 150 mg PO x 1 dose today  -12/10 Urology appt on 12/6-Diflucan 100 mg PO QD x 7 days prescribed for persistent yeast infection, UC obtained during visit-awaiting results, gonzales exchanged in office  -12/17 Diflucan completed, no further interventions required at this time    Debility r/t recent sigmoidectomy/ARF, ongoing  - Continue with PT/OT for gait training and strengthening and restoration of ADL's   - Encourage mobility, OOB in chair, and early ambulation as appropriate  - Fall precautions   - Monitor for bowel and bladder dysfunction  - Monitor for and prevent skin breakdown and pressure ulcers  - Pain control: Tylenol  PRN  -12/17 Continue PT/OT    Peripheral vascular disease, unspecified, chronic  Remote Hx of MI   HTN with Hyperlipidemia, stable  -Continue Lisinopril, Coreg ( d/anabella during admit), pravastatin  -12/17 Continue current regimen    Urinary retention, ongoing  -Follow up with Urology as outpatient.   -Martinez re inserted at facility on 11/15 for retention  -12/17 continue indwelling Martinez, changed on 12/6 during office appt    Right vocal cord paralysis with hoarse voice, improving  -ENT consulted to during admit, recommended a CT neck and chest with IV contrast once kidney function improved  -ST following    -Chloraseptic spray PRN sore throat  -12/17 Cont ST     Continued    Hypoalbuminemia, new  Hyponatremia, new  Hypophosphatemia, improved  Hypomagnesemia, improved  Hypokalemia, improved  -continue neutraphos, KCl replacement, Mg replacement  -12/10 initiate prostat 30 ml PO BID, monitor labwork    Colon necrosis, improved   S/P sigmoidectomy with ostomy placement 10/29/19  E. coli bacteremia, resolved  -Treated with Keflexand metronidazole per ID until 11/15/16  -12/5 Gen sx appt on 12/4-staples removed to abd incision, no other changes in plan of care     Multiple Wounds, ongoing  1. Stage II coccyx PU  2. Abdominal surgical incision-staples removed, healing  3.R chest wall port accessed  - SMILEY RN following closely, will follow weekly  -12/5 wound assessment completed, continue with current orders    Right ankle pain, improved  -11/26 initiate right ankle xray  -12/3 x-ray of right ankle negative for fracture or abnormality, no further interventions needed    DLBCL (diffuse large B cell lymphoma)  Anemia due to antineoplastic chemotherapy  Thrombocytopenia, stable  -Transfused pRBCs during admit  -Patient has RU chest wall port that is accessed upon admit to facility  -Follow up with hem onc as outpatient.    Slow transit constipation, ongoing  -Continue polyethylene glycol BID (home med)    Epilepsy, chronic,  stable  MDD with recurrent episodes, ongoing  -Continue home mirtazapine 7.5mg HS.  -Continue phenobarb, Trileptal     Acquired hypothyroidism, stable  -Continue home levothyroxine 125 mcg before breakfast.    Vitamin D Deficiency, stable  -Continue Vitamin D 3 replacement + Os sukhdev 1500 mg QD        Acute renal failure due to Acute tubular necrosis, resolved  DIC (disseminated intravascular coagulation), resolved    Outpatient MDs: Her primary care physician is Dr. Jose Torres. Her pain management specialist is Dr. Chirag Bates. Her neurologist is Dr. Gamal Lock.       Future Appointments   Date Time Provider Department Center   12/17/2019  3:00 PM Timmy Simmons MD [facility] CARDIO [facility]   1/3/2020  1:20 PM Chris Johnson MD [facility] GENSUR [facility]   1/10/2020 10:00 AM Carleen Estrada MD [facility] UROLOGY [facility]   4/2/2020 11:00 AM CHAIR 07 [facility] CHEMO [facility]         Anat Dawkins NP          Patient note was created using MModal Dictation.  Any errors in syntax or even information may not have been identified and edited on initial review prior to signing this note.

## 2019-12-20 ENCOUNTER — DOCUMENTATION ONLY (OUTPATIENT)
Dept: HEPATOLOGY | Facility: HOSPITAL | Age: 84
End: 2019-12-20

## 2019-12-20 NOTE — PROGRESS NOTES
[facility]                                                                              Skilled Nursing Facility                                                                  Progress Note     Admit Date:  11/12/2019  JESSICA   Principal Problem:  S/P sepsis/JAS sigmoid colon resection  HPI obtained from patient interview and chart review     Visit Date: 12/20/2019    Chief Complaint:  Wound evaluation, nausea, PT/OT progression    HPI:   Ms. Garcia is an 84-year-old female with a past medical history of peripheral vascular disease, renovascular hypertension, renal artery stenosis status post left renal artery stent placement on 7/19/17, coronary artery disease with history of myocardial infarction, diffuse large B cell lymphoma, anemia, epilepsy, hypothyroidism, chronic hyponatremia, depression, lumbar and sacroiliac joint osteoarthritis with chronic low back pain and history of lumbar spine surgery in 2013, slow transit constipation, with hx of small bowel resection on 8/23/16. She presented to [facility] with c/o abdominal pain, difficulty urinating, and constipation for one day. In the emergency department, she was found to have acute kidney injury (BUN 27, creatinine 1.6, from 9 and 0.7 on 8/29/19), elevated transaminases ( and , from 16 and 11 on 8/29/19), and lactic acidosis (5.8). Contrast CT showed acute enteritis, sigmoid and rectal constipation, and some peritoneal free fluid in the pericolic gutters. She was tx with IVF, and multiple antibiotics. She ultimately suffered from septic shock, was intubated, had a dialysis catheter placed due to oliguria, had a transvenous pacer placed for AF with sinus pauses, and was followed do hyponatremia in light of epilepsy hx. She also developed metabolic encephalopathy post op. She is s/p sigmoid colectomy with end colostomy after finding  sigmoid necrosis on 10/29. A Gonzales catheter was placed on 11/5/19 for urinary retention and she will follow up with Urology. The gonzales was d/anabella prior to admit to [facility], but had to be reinserted on 11/15 due to continued retention. She was found to have right vocal cord paralysis as well and ENT was consulted, who recommended at CT scan to f/u once JAS resolved. Infectious Disease recommended finishing cephalexin, metronidazole, and fluconazole on 11/15/19. SNF recommended for further care after d/c.    Patient will be treated at [facility] SNF with PT and OT to improve functional status and ability to perform ADLs.     Interval history:  Wound evaluation completed today stage II coccyx pressure ulcer, wound improving with aggressive wound care per facility WC RN.  No changes needed to plan of care.  Patient continuing on current pain regimen as well as with PT/OT.  She has reports of mild nausea today during visit while doing therapy, addressed with patient's nurse.Patient is medically stable, but with multiple chronic conditions that could lend to guarded condition at times.  Will continue to monitor and treat of chronic conditions.  Progressing slowly with PT/OT.    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    Past Surgical History: Patient has a past surgical history that includes Cholecystectomy; Appendectomy; Tonsillectomy; Vaginal hysterectomy w/ anterior and posterior vaginal repair; Cataract extraction, bilateral (Bilateral); Hysterectomy; Eye surgery (Bilateral); Portacath placement (Right, 09/2016); Back surgery (1988); Back surgery (02/2013); Renal artery stent (Left, 07/19/2017); Small intestine surgery (08/23/2016); and Sigmoidectomy (10/29/2019).    Social History: Patient reports that she has never smoked. She  has never used smokeless tobacco. She reports that she does not drink alcohol or use drugs.    Family History: family history includes Heart attack in her father; Hypertension in her father; Pancreatic cancer in her mother.    Allergies: Patient is allergic to adhesive; penicillins; tramadol; avelox [moxifloxacin]; amoxil [amoxicillin]; aspridrox [aspirin, buffered]; codeine; keflex [cephalexin]; norvasc [amlodipine]; red dye; robitussin [guaifenesin]; sulfa (sulfonamide antibiotics); tylenol [acetaminophen]; and vicks vaporub [camphor-eucalyptus oil-menthol].    ROS  Constitutional: Negative for fever or fatigue, + hoarse voice , improving  Eyes: Negative for blurred vision, double vision and discharge.   Respiratory: Negative for cough, shortness of breath and wheezing.    Cardiovascular: Negative for chest pain, palpitations, and leg swelling.   Gastrointestinal: Negative for abdominal pain, constipation, diarrhea, +nausea and -vomiting.   Genitourinary: Negative for dysuria, frequency and urgency.   Musculoskeletal:  + generalized weakness. Negative for back pain and myalgias, + right ankle pain, improved.   Skin: Negative for itching and rash.   Neurological: Negative for dizziness, speech change, and headaches.   Psychiatric/Behavioral: Negative for depression. The patient is not nervous/anxious.      PEx     Constitutional: Patient appears well-developed and in no distress   Head: Normocephalic and atraumatic.   Eyes: Pupils are equal, round, and reactive to light.   Neck: Normal range of motion. Neck supple.   Cardiovascular: Normal rate, regular rhythm and normal heart sounds.    Pulmonary/Chest: Effort normal and breath sounds are clear  Genitourinary:   indwelling Martinez  Abdominal: Soft. Bowel sounds are normal, +ostomy with brown stool noted, plus mild nausea  Musculoskeletal: Normal range of motion, + right ankle pain, improved   Neurological: Alert and oriented to person, place, and time.    Psychiatric: Normal mood and affect. Behavior is normal, + hoarse voice, improving.   Skin: Skin is warm and dry, + stage II pressure ulcer to coccyx, R U chest port a cath-not accessed, Abdominal surgical incision resolved    Wound evaluation completed on 12/20/2019:    Wound location:  Left lower quadrant abdominal surgical incision, resolved    Wound location:  Stage II pressure ulcer to coccyx, improving  Date identified:  11/13/2019  Present on admit:  Yes  Appearance of wound:  20% epithelial/80% granulation  Drainage characteristic:  Serosanguineous  Wound length:  1.10 cm   Wound width:  1.9 cm  Wound depth:0.20 cm   Alie wound area:  Irregular edges, scar tissue, normal skin tone   Following: This NP weekly- last observed on (12/20/19), wound care RN weekly- last observed on (12/20/19)    Assessment and Plan:    Multiple Wounds, ongoing  1. Stage II coccyx PU  2. Abdominal surgical incision-staples removed, healing  3.R chest wall port accessed  -  RN following closely, will follow weekly  -12/20  wound assessment completed, continue with current orders    Debility r/t recent sigmoidectomy/ARF, ongoing  - Continue with PT/OT for gait training and strengthening and restoration of ADL's   - Encourage mobility, OOB in chair, and early ambulation as appropriate  - Fall precautions   - Monitor for bowel and bladder dysfunction  - Monitor for and prevent skin breakdown and pressure ulcers  - Pain control: Tylenol PRN  -12/20 Continue PT/OT    Continued    Vaginal Yeast infection, improved  -Treated with 4 day course of low dose Diflucan during admit> infection persists.   -Tx with Diflucan 150 mg PO x 1 dose today  -12/10 Urology appt on 12/6-Diflucan 100 mg PO QD x 7 days prescribed for persistent yeast infection, UC obtained during visit-awaiting results, gonzales exchanged in office  -12/17 Diflucan completed, no further interventions required at this time    Peripheral vascular disease, unspecified,  chronic  Remote Hx of MI   HTN with Hyperlipidemia, stable  -Continue Lisinopril, Coreg ( d/anabella during admit), pravastatin  -12/17 Continue current regimen    Urinary retention, ongoing  -Follow up with Urology as outpatient.   -Martinez re inserted at facility on 11/15 for retention  -12/17 continue indwelling Martinez, changed on 12/6 during office appt    Right vocal cord paralysis with hoarse voice, improving  -ENT consulted to during admit, recommended a CT neck and chest with IV contrast once kidney function improved  -ST following    -Chloraseptic spray PRN sore throat  -12/17 Cont ST     Hypoalbuminemia, new  Hyponatremia, new  Hypophosphatemia, improved  Hypomagnesemia, improved  Hypokalemia, improved  -continue neutraphos, KCl replacement, Mg replacement  -12/10 initiate prostat 30 ml PO BID, monitor labwork    Colon necrosis, improved   S/P sigmoidectomy with ostomy placement 10/29/19  E. coli bacteremia, resolved  -Treated with Keflexand metronidazole per ID until 11/15/16  -12/5 Gen sx appt on 12/4-staples removed to abd incision, no other changes in plan of care     Right ankle pain, improved  -11/26 initiate right ankle xray  -12/3 x-ray of right ankle negative for fracture or abnormality, no further interventions needed    DLBCL (diffuse large B cell lymphoma)  Anemia due to antineoplastic chemotherapy  Thrombocytopenia, stable  -Transfused pRBCs during admit  -Patient has RU chest wall port that is accessed upon admit to facility  -Follow up with hem onc as outpatient.    Slow transit constipation, ongoing  -Continue polyethylene glycol BID (home med)    Epilepsy, chronic, stable  MDD with recurrent episodes, ongoing  -Continue home mirtazapine 7.5mg HS.  -Continue phenobarb, Trileptal     Acquired hypothyroidism, stable  -Continue home levothyroxine 125 mcg before breakfast.    Vitamin D Deficiency, stable  -Continue Vitamin D 3 replacement + Os sukhdev 1500 mg QD        Acute renal failure due to Acute  tubular necrosis, resolved  DIC (disseminated intravascular coagulation), resolved    Outpatient MDs: Her primary care physician is Dr. Jose Torres. Her pain management specialist is Dr. Chirag Bates. Her neurologist is Dr. Gamal Lock.       Future Appointments   Date Time Provider Department Center   1/3/2020  1:20 PM Chris Johnson MD [facility] GENSUR [facility]   1/10/2020 10:00 AM Carleen Estrada MD [facility] UROLOGY [facility]   4/2/2020 11:00 AM CHAIR 07 [facility] CHEMO [facility]         Anat Dawkins NP          Patient note was created using MModal Dictation.  Any errors in syntax or even information may not have been identified and edited on initial review prior to signing this note.

## 2019-12-22 DIAGNOSIS — I10 ESSENTIAL HYPERTENSION: ICD-10-CM

## 2019-12-23 RX ORDER — LISINOPRIL 40 MG/1
TABLET ORAL
Qty: 90 TABLET | Refills: 3 | Status: ON HOLD | OUTPATIENT
Start: 2019-12-23 | End: 2020-08-14 | Stop reason: HOSPADM

## 2019-12-23 RX ORDER — CLONIDINE HYDROCHLORIDE 0.2 MG/1
0.2 TABLET ORAL 2 TIMES DAILY
Qty: 180 TABLET | Refills: 3 | Status: ON HOLD | OUTPATIENT
Start: 2019-12-23 | End: 2020-02-04 | Stop reason: SDUPTHER

## 2019-12-23 NOTE — PROGRESS NOTES
[facility]                                                                              Skilled Nursing Facility                                                                  Progress Note     Admit Date:  11/12/2019  JESSICA   Principal Problem:  S/P sepsis/JAS sigmoid colon resection  HPI obtained from patient interview and chart review     Visit Date: 12/24/2019    Chief Complaint:  BP/HR monitoring, PT/OT progression    HPI:   Ms. Garcia is an 84-year-old female with a past medical history of peripheral vascular disease, renovascular hypertension, renal artery stenosis status post left renal artery stent placement on 7/19/17, coronary artery disease with history of myocardial infarction, diffuse large B cell lymphoma, anemia, epilepsy, hypothyroidism, chronic hyponatremia, depression, lumbar and sacroiliac joint osteoarthritis with chronic low back pain and history of lumbar spine surgery in 2013, slow transit constipation, with hx of small bowel resection on 8/23/16. She presented to [facility] with c/o abdominal pain, difficulty urinating, and constipation for one day. In the emergency department, she was found to have acute kidney injury (BUN 27, creatinine 1.6, from 9 and 0.7 on 8/29/19), elevated transaminases ( and , from 16 and 11 on 8/29/19), and lactic acidosis (5.8). Contrast CT showed acute enteritis, sigmoid and rectal constipation, and some peritoneal free fluid in the pericolic gutters. She was tx with IVF, and multiple antibiotics. She ultimately suffered from septic shock, was intubated, had a dialysis catheter placed due to oliguria, had a transvenous pacer placed for AF with sinus pauses, and was followed do hyponatremia in light of epilepsy hx. She also developed metabolic encephalopathy post op. She is s/p sigmoid colectomy with end colostomy after finding sigmoid  necrosis on 10/29. A Gonzales catheter was placed on 11/5/19 for urinary retention and she will follow up with Urology. The gonzales was d/anabella prior to admit to [facility], but had to be reinserted on 11/15 due to continued retention. She was found to have right vocal cord paralysis as well and ENT was consulted, who recommended at CT scan to f/u once JAS resolved. Infectious Disease recommended finishing cephalexin, metronidazole, and fluconazole on 11/15/19. SNF recommended for further care after d/c.    Patient will be treated at [facility]F with PT and OT to improve functional status and ability to perform ADLs.     Interval history:  /55, , controlled on current regimen. Continuing with indwelling gonzales.  No complaints during visit today.  Patient continued with conditions that could lend to guarded condition at times.  Will continue to monitor and treat of chronic conditions.  Progressing slowly with PT/OT.    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    Past Surgical History: Patient has a past surgical history that includes Cholecystectomy; Appendectomy; Tonsillectomy; Vaginal hysterectomy w/ anterior and posterior vaginal repair; Cataract extraction, bilateral (Bilateral); Hysterectomy; Eye surgery (Bilateral); Portacath placement (Right, 09/2016); Back surgery (1988); Back surgery (02/2013); Renal artery stent (Left, 07/19/2017); Small intestine surgery (08/23/2016); and Sigmoidectomy (10/29/2019).    Social History: Patient reports that she has never smoked. She has never used smokeless tobacco. She reports that she does not drink alcohol or use drugs.    Family History: family history includes Heart attack in her father; Hypertension in her father; Pancreatic cancer in her mother.    Allergies: Patient is  allergic to adhesive; penicillins; tramadol; avelox [moxifloxacin]; amoxil [amoxicillin]; aspridrox [aspirin, buffered]; codeine; keflex [cephalexin]; norvasc [amlodipine]; red dye; robitussin [guaifenesin]; sulfa (sulfonamide antibiotics); tylenol [acetaminophen]; and vicks vaporub [camphor-eucalyptus oil-menthol].    ROS  Constitutional: Negative for fever or fatigue, + hoarse voice , improving  Eyes: Negative for blurred vision, double vision and discharge.   Respiratory: Negative for cough, shortness of breath and wheezing.    Cardiovascular: Negative for chest pain, palpitations, and leg swelling.   Gastrointestinal: Negative for abdominal pain, constipation, diarrhea, -nausea and vomiting.   Genitourinary: Negative for dysuria, frequency and urgency.   Musculoskeletal:  + generalized weakness. Negative for back pain and myalgias, + right ankle pain, improved.   Skin: Negative for itching and rash.   Neurological: Negative for dizziness, speech change, and headaches.   Psychiatric/Behavioral: Negative for depression. The patient is not nervous/anxious.      PEx     Constitutional: Patient appears well-developed and in no distress   Head: Normocephalic and atraumatic.   Eyes: Pupils are equal, round, and reactive to light.   Neck: Normal range of motion. Neck supple.   Cardiovascular: Normal rate, regular rhythm and normal heart sounds.    Pulmonary/Chest: Effort normal and breath sounds are clear  Genitourinary:   indwelling Martinez  Abdominal: Soft. Bowel sounds are normal, +ostomy with brown stool noted, plus mild nausea  Musculoskeletal: Normal range of motion, + right ankle pain, improved   Neurological: Alert and oriented to person, place, and time.   Psychiatric: Normal mood and affect. Behavior is normal, + hoarse voice, improving.   Skin: Skin is warm and dry, + stage II pressure ulcer to coccyx, R U chest port a cath-not accessed, Abdominal surgical incision resolved    Wound evaluation completed on  12/20/2019:    Wound location:  Left lower quadrant abdominal surgical incision, resolved    Wound location:  Stage II pressure ulcer to coccyx, improving  Date identified:  11/13/2019  Present on admit:  Yes  Appearance of wound:  20% epithelial/80% granulation  Drainage characteristic:  Serosanguineous  Wound length:  1.10 cm   Wound width:  1.9 cm  Wound depth:0.20 cm   Alie wound area:  Irregular edges, scar tissue, normal skin tone   Following: This NP weekly- last observed on (12/20/19), wound care RN weekly- last observed on (12/20/19)    Assessment and Plan:    Debility r/t recent sigmoidectomy/ARF, ongoing  - Continue with PT/OT for gait training and strengthening and restoration of ADL's   - Encourage mobility, OOB in chair, and early ambulation as appropriate  - Fall precautions   - Monitor for bowel and bladder dysfunction  - Monitor for and prevent skin breakdown and pressure ulcers  - Pain control: Tylenol PRN  -12/24 Continue PT/OT    Peripheral vascular disease, unspecified, chronic  Remote Hx of MI   HTN with Hyperlipidemia, stable  -Continue Lisinopril, Coreg ( d/anabella during admit), pravastatin  -12/24 Continue current regimen    Urinary retention, ongoing  -Follow up with Urology as outpatient.   -Gonzales re inserted at facility on 11/15 for retention  -12/24 continue indwelling Gonzales, changed on 12/6 during office appt    Continued    Multiple Wounds, ongoing  1. Stage II coccyx PU  2. Abdominal surgical incision-staples removed, healing  3.R chest wall port accessed  -  RN following closely, will follow weekly  -12/20  wound assessment completed, continue with current orders    Vaginal Yeast infection, improved  -Treated with 4 day course of low dose Diflucan during admit> infection persists.   -Tx with Diflucan 150 mg PO x 1 dose today  -12/10 Urology appt on 12/6-Diflucan 100 mg PO QD x 7 days prescribed for persistent yeast infection, UC obtained during visit-awaiting results, gonzales exchanged  in office  -12/17 Diflucan completed, no further interventions required at this time    Right vocal cord paralysis with hoarse voice, improving  -ENT consulted to during admit, recommended a CT neck and chest with IV contrast once kidney function improved  -ST following    -Chloraseptic spray PRN sore throat  -12/17 Cont ST     Hypoalbuminemia, new  Hyponatremia, new  Hypophosphatemia, improved  Hypomagnesemia, improved  Hypokalemia, improved  -continue neutraphos, KCl replacement, Mg replacement  -12/10 initiate prostat 30 ml PO BID, monitor labwork    Colon necrosis, improved   S/P sigmoidectomy with ostomy placement 10/29/19  E. coli bacteremia, resolved  -Treated with Keflexand metronidazole per ID until 11/15/16  -12/5 Gen sx appt on 12/4-staples removed to abd incision, no other changes in plan of care     Right ankle pain, improved  -11/26 initiate right ankle xray  -12/3 x-ray of right ankle negative for fracture or abnormality, no further interventions needed    DLBCL (diffuse large B cell lymphoma)  Anemia due to antineoplastic chemotherapy  Thrombocytopenia, stable  -Transfused pRBCs during admit  -Patient has RU chest wall port that is accessed upon admit to facility  -Follow up with hem onc as outpatient.    Slow transit constipation, ongoing  -Continue polyethylene glycol BID (home med)    Epilepsy, chronic, stable  MDD with recurrent episodes, ongoing  -Continue home mirtazapine 7.5mg HS.  -Continue phenobarb, Trileptal     Acquired hypothyroidism, stable  -Continue home levothyroxine 125 mcg before breakfast.    Vitamin D Deficiency, stable  -Continue Vitamin D 3 replacement + Os sukhdev 1500 mg QD        Acute renal failure due to Acute tubular necrosis, resolved  DIC (disseminated intravascular coagulation), resolved    Outpatient MDs: Her primary care physician is Dr. Jose Torres. Her pain management specialist is Dr. Chirag Bates. Her neurologist is Dr. Gamal Lock.       Future  Appointments   Date Time Provider Department Center   1/3/2020  1:20 PM Chris Johnson MD [facility] GENSUR [facility]   1/10/2020 10:00 AM Carleen Estrada MD [facility] UROLOGY [facility]   4/2/2020 11:00 AM  07 [facility] CHEMO [facility]         Anat Dawkins NP          Patient note was created using MModal Dictation.  Any errors in syntax or even information may not have been identified and edited on initial review prior to signing this note.

## 2019-12-24 ENCOUNTER — DOCUMENTATION ONLY (OUTPATIENT)
Dept: HEPATOLOGY | Facility: HOSPITAL | Age: 84
End: 2019-12-24

## 2019-12-27 ENCOUNTER — TELEPHONE (OUTPATIENT)
Dept: CARDIOLOGY | Facility: CLINIC | Age: 84
End: 2019-12-27

## 2019-12-27 NOTE — TELEPHONE ENCOUNTER
Reached out to Lizzette with pt nursing home     Advised about scheduled test date and time as well as clinical f/u with Dr. Simmons

## 2019-12-27 NOTE — TELEPHONE ENCOUNTER
----- Message from Bruna Gaytan sent at 12/27/2019  1:44 PM CST -----  Contact: Lizzette soria/ [facility]  CALL TYPE: PATIENT REQUEST    Contact: [facility]  Name of Who is Calling: Lizzette  What is the request in detail: clarity on holter monitor orders  Can the clinic reply by [facility]: no   What Number to Call Back if not in [facility]: [phone]

## 2019-12-28 PROBLEM — I48.0 PAROXYSMAL ATRIAL FIBRILLATION: Status: ACTIVE | Noted: 2019-12-28

## 2019-12-28 NOTE — PROGRESS NOTES
Subjective:    Patient ID:  Annette Garcia is a 84 y.o. female who presents for follow-up of Irregular Heart Beat and Hypertension      HPI    83 y/o female with hx of difficult to control HTN (previously on a diuretic but dicsontinued due to recurrent dehydration), renal artery stenosis (LRA severe ostial disease s/p YOLANDA, RRA with mild-mod disease), anemia, acquired hypothyroidism, diffuse large B-cell lymphoma previously on chemo. She presents for hospital f/u.  Was hospitalized for abdominal pain, found to have bowel obstruction, had surgical intervention, developed septic shock requiring intubation, developed JAS, developed afib with sinus pauses, TVP placed, pt slowly improved, extubated, and eventually discharged to NH. Re-admitted for syncopal episode, found to be orthostatic, clinically improved, and discharged back to NH.   Currently mostly wchr bound due to debility post hospitalization. Has gonzales and following with Urology. Followed with Surgery and plan is to keep ostomy longterm. BP improved in clinic today.   No recent dietary indiscretion. Has had prior visits admitting to dietary indiscretion with high sodium diet including Nicole's Pie, Corn bisque, and onion rings (previously was V8, canned foods, TV dinners, soft drinks, gumbo, chinese food).     Review of Systems   Constitution: Positive for malaise/fatigue.   HENT: Negative for congestion.    Eyes: Negative for blurred vision.   Cardiovascular: Positive for dyspnea on exertion, leg swelling and syncope. Negative for chest pain, claudication, cyanosis, irregular heartbeat, near-syncope, orthopnea, palpitations and paroxysmal nocturnal dyspnea.   Respiratory: Positive for shortness of breath.    Endocrine: Negative for polyuria.   Hematologic/Lymphatic: Negative for bleeding problem.   Skin: Negative for itching and rash.   Musculoskeletal: Positive for back pain, joint pain and muscle weakness. Negative for joint swelling and muscle cramps.    Gastrointestinal: Negative for abdominal pain, hematemesis, hematochezia, melena, nausea and vomiting.   Genitourinary: Negative for dysuria and hematuria.   Neurological: Positive for loss of balance and weakness. Negative for dizziness, focal weakness, headaches and light-headedness.   Psychiatric/Behavioral: Negative for depression. The patient is not nervous/anxious.         Objective:    Physical Exam   Constitutional: She is oriented to person, place, and time. She appears well-developed and well-nourished.   HENT:   Head: Normocephalic and atraumatic.   Neck: Neck supple. No JVD present.   Cardiovascular: Normal rate, regular rhythm and normal heart sounds.   Pulses:       Carotid pulses are 2+ on the right side, and 2+ on the left side.       Radial pulses are 2+ on the right side, and 2+ on the left side.        Femoral pulses are 2+ on the right side, and 2+ on the left side.       Dorsalis pedis pulses are 2+ on the right side, and 2+ on the left side.        Posterior tibial pulses are 2+ on the right side, and 2+ on the left side.   Pulmonary/Chest: Effort normal and breath sounds normal.   Abdominal: Soft. Bowel sounds are normal.   Musculoskeletal: She exhibits no edema.   Neurological: She is alert and oriented to person, place, and time.   Skin: Skin is warm and dry.   Psychiatric: She has a normal mood and affect. Her behavior is normal. Thought content normal.         Assessment:       1. Syncope and collapse    2. Paroxysmal atrial fibrillation    3. Sinus pause    4. Bilateral renal artery stenosis    5. Old MI (myocardial infarction)    6. Peripheral vascular disease, unspecified    7. Renovascular hypertension    8. Uncontrolled hypertension    9. Closed wedge compression fracture of eleventh thoracic vertebra with routine healing    10. Osteoarthritis of lumbar spine, unspecified spinal osteoarthritis complication status    11. Depression, unspecified depression type    12. E coli  bacteremia    13. Diffuse large B-cell lymphoma, unspecified body region    14. Acquired hypothyroidism    15. Colon necrosis    16. S/P exploratory laparotomy    17. S/P partial resection of colon    18. History of stent insertion of left renal artery 7/19/17    19. Paralysis of right vocal cord      83 y/o pt with hx and presentation as above. Doing well from a cardiac perspective and compensated from a HF perspective. Will obtain Holter to evaluate for further episodes of pauses and arrhythmias. Has elevated CHADSVasc score, however, given elevated fall risk, anemia requiring PRBC transfusion, likely not a good AC candidate. This was discussed with pt and daughter who are in agreement. Discussed the etiology, evaluation, and management of sycnope, afib, YOLANDA, HTN, debility. Discussed the importance of med compliance, heart healthy diet, and regular exercise.        Plan:       -Holter x 48 hours  -f/u in 1 month

## 2019-12-30 NOTE — PROGRESS NOTES
[facility]                                                                              Skilled Nursing Facility                                                                  Progress Note     Admit Date:  11/12/2019  JESSICA   Principal Problem:  S/P sepsis/JAS sigmoid colon resection  HPI obtained from patient interview and chart review     Visit Date: 12/31/2019    Chief Complaint:  Holter monitor follow-up, BP/HR monitoring, PT/OT progression    HPI:   Ms. Garcia is an 84-year-old female with a past medical history of peripheral vascular disease, renovascular hypertension, renal artery stenosis status post left renal artery stent placement on 7/19/17, coronary artery disease with history of myocardial infarction, diffuse large B cell lymphoma, anemia, epilepsy, hypothyroidism, chronic hyponatremia, depression, lumbar and sacroiliac joint osteoarthritis with chronic low back pain and history of lumbar spine surgery in 2013, slow transit constipation, with hx of small bowel resection on 8/23/16. She presented to [facility] with c/o abdominal pain, difficulty urinating, and constipation for one day. In the emergency department, she was found to have acute kidney injury (BUN 27, creatinine 1.6, from 9 and 0.7 on 8/29/19), elevated transaminases ( and , from 16 and 11 on 8/29/19), and lactic acidosis (5.8). Contrast CT showed acute enteritis, sigmoid and rectal constipation, and some peritoneal free fluid in the pericolic gutters. She was tx with IVF, and multiple antibiotics. She ultimately suffered from septic shock, was intubated, had a dialysis catheter placed due to oliguria, had a transvenous pacer placed for AF with sinus pauses, and was followed do hyponatremia in light of epilepsy hx. She also developed metabolic encephalopathy post op. She is s/p sigmoid colectomy with end  colostomy after finding sigmoid necrosis on 10/29. A Gonzales catheter was placed on 11/5/19 for urinary retention and she will follow up with Urology. The gonzales was d/anabella prior to admit to [facility], but had to be reinserted on 11/15 due to continued retention. She was found to have right vocal cord paralysis as well and ENT was consulted, who recommended at CT scan to f/u once JAS resolved. Infectious Disease recommended finishing cephalexin, metronidazole, and fluconazole on 11/15/19. SNF recommended for further care after d/c.    Patient will be treated at [facility] SNF with PT and OT to improve functional status and ability to perform ADLs.     Interval history:  /69, , stable on current regimen.  Facility called to clarify need for Holter monitor with Cardiology, stated they will follow up with patient when she is scheduled for upcoming office appointment on 01/30. No complaints during visit today, other than intermittent nausea.  Patient continued with conditions that could lend to guarded condition at times.  Will continue to monitor and treat of chronic conditions.  Progressing slowly with PT/OT.    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    Past Surgical History: Patient has a past surgical history that includes Cholecystectomy; Appendectomy; Tonsillectomy; Vaginal hysterectomy w/ anterior and posterior vaginal repair; Cataract extraction, bilateral (Bilateral); Hysterectomy; Eye surgery (Bilateral); Portacath placement (Right, 09/2016); Back surgery (1988); Back surgery (02/2013); Renal artery stent (Left, 07/19/2017); Small intestine surgery (08/23/2016); and Sigmoidectomy (10/29/2019).    Social History: Patient reports that she has never smoked. She has never used smokeless tobacco. She reports that she  does not drink alcohol or use drugs.    Family History: family history includes Heart attack in her father; Hypertension in her father; Pancreatic cancer in her mother.    Allergies: Patient is allergic to adhesive; penicillins; tramadol; avelox [moxifloxacin]; amoxil [amoxicillin]; aspridrox [aspirin, buffered]; codeine; keflex [cephalexin]; norvasc [amlodipine]; red dye; robitussin [guaifenesin]; sulfa (sulfonamide antibiotics); tylenol [acetaminophen]; and vicks vaporub [camphor-eucalyptus oil-menthol].    ROS  Constitutional: Negative for fever or fatigue, + hoarse voice , improving  Eyes: Negative for blurred vision, double vision and discharge.   Respiratory: Negative for cough, shortness of breath and wheezing.    Cardiovascular: Negative for chest pain, palpitations, and leg swelling.   Gastrointestinal: Negative for abdominal pain, constipation, diarrhea, + intermittent ausea and vomiting.   Genitourinary: Negative for dysuria, frequency and urgency.   Musculoskeletal:  + generalized weakness. Negative for back pain and myalgias, + right ankle pain, improved.   Skin: Negative for itching and rash.   Neurological: Negative for dizziness, speech change, and headaches.   Psychiatric/Behavioral: Negative for depression. The patient is not nervous/anxious.      PEx     Constitutional: Patient appears well-developed and in no distress   Head: Normocephalic and atraumatic.   Eyes: Pupils are equal, round, and reactive to light.   Neck: Normal range of motion. Neck supple.   Cardiovascular: Normal rate, regular rhythm and normal heart sounds.    Pulmonary/Chest: Effort normal and breath sounds are clear  Genitourinary:   indwelling Martinez  Abdominal: Soft. Bowel sounds are normal, +ostomy with brown stool noted, plus mild nausea  Musculoskeletal: Normal range of motion, + right ankle pain, improved   Neurological: Alert and oriented to person, place, and time.   Psychiatric: Normal mood and affect. Behavior is  normal, + hoarse voice, improving.   Skin: Skin is warm and dry, + stage II pressure ulcer to coccyx, R U chest port a cath-not accessed, Abdominal surgical incision resolved    Wound evaluation completed on 12/20/2019:    Wound location:  Left lower quadrant abdominal surgical incision, resolved    Wound location:  Stage II pressure ulcer to coccyx, improving  Date identified:  11/13/2019  Present on admit:  Yes  Appearance of wound:  20% epithelial/80% granulation  Drainage characteristic:  Serosanguineous  Wound length:  1.10 cm   Wound width:  1.9 cm  Wound depth:0.20 cm   Alie wound area:  Irregular edges, scar tissue, normal skin tone   Following: This NP weekly- last observed on (12/20/19), wound care RN weekly- last observed on (12/20/19)    Assessment and Plan:    Debility r/t recent sigmoidectomy/ARF, ongoing  - Continue with PT/OT for gait training and strengthening and restoration of ADL's   - Encourage mobility, OOB in chair, and early ambulation as appropriate  - Fall precautions   - Monitor for bowel and bladder dysfunction  - Monitor for and prevent skin breakdown and pressure ulcers  - Pain control: Tylenol PRN  -12/31 Continue PT/OT    Peripheral vascular disease, unspecified, chronic  Remote Hx of MI   HTN with Hyperlipidemia, stable  -Continue Lisinopril, Coreg ( d/anabella during admit), pravastatin  -12/31 Continue current regimen    Urinary retention, ongoing  -Follow up with Urology as outpatient.   -Martinez re inserted at facility on 11/15 for retention  -12/31 continue indwelling Martinez, changed on 12/6 during office appt    Continued    Multiple Wounds, ongoing  1. Stage II coccyx PU  2. Abdominal surgical incision-staples removed, healing  3.R chest wall port accessed  -  RN following closely, will follow weekly  -12/20  wound assessment completed, continue with current orders    Vaginal Yeast infection, improved  -Treated with 4 day course of low dose Diflucan during admit> infection persists.    -Tx with Diflucan 150 mg PO x 1 dose today  -12/10 Urology appt on 12/6-Diflucan 100 mg PO QD x 7 days prescribed for persistent yeast infection, UC obtained during visit-awaiting results, gonzales exchanged in office  -12/17 Diflucan completed, no further interventions required at this time    Right vocal cord paralysis with hoarse voice, improving  -ENT consulted to during admit, recommended a CT neck and chest with IV contrast once kidney function improved  -ST following    -Chloraseptic spray PRN sore throat  -12/17 Cont ST     Hypoalbuminemia, new  Hyponatremia, new  Hypophosphatemia, improved  Hypomagnesemia, improved  Hypokalemia, improved  -continue neutraphos, KCl replacement, Mg replacement  -12/10 initiate prostat 30 ml PO BID, monitor labwork    Colon necrosis, improved   S/P sigmoidectomy with ostomy placement 10/29/19  E. coli bacteremia, resolved  -Treated with Keflexand metronidazole per ID until 11/15/16  -12/5 Gen sx appt on 12/4-staples removed to abd incision, no other changes in plan of care     Right ankle pain, improved  -11/26 initiate right ankle xray  -12/3 x-ray of right ankle negative for fracture or abnormality, no further interventions needed    DLBCL (diffuse large B cell lymphoma)  Anemia due to antineoplastic chemotherapy  Thrombocytopenia, stable  -Transfused pRBCs during admit  -Patient has RU chest wall port that is accessed upon admit to facility  -Follow up with hem onc as outpatient.    Slow transit constipation, ongoing  -Continue polyethylene glycol BID (home med)    Epilepsy, chronic, stable  MDD with recurrent episodes, ongoing  -Continue home mirtazapine 7.5mg HS.  -Continue phenobarb, Trileptal     Acquired hypothyroidism, stable  -Continue home levothyroxine 125 mcg before breakfast.    Vitamin D Deficiency, stable  -Continue Vitamin D 3 replacement + Os sukhdev 1500 mg QD        Acute renal failure due to Acute tubular necrosis, resolved  DIC (disseminated intravascular  coagulation), resolved    Outpatient MDs: Her primary care physician is Dr. Jose Torres. Her pain management specialist is Dr. Chirag Bates. Her neurologist is Dr. Gamal Lock.       Future Appointments   Date Time Provider Department Center   1/3/2020  1:20 PM Chris Johnson MD [facility]UR [place] [facility]   1/7/2020 10:00 AM ECHO/HOLTER/REDD, [facility] RVPH [facility]   1/10/2020 10:00 AM Carleen Estrada MD [facility] UROLOGY [place] [facility]   1/30/2020  3:00 PM Timmy Simmons MD [facility] CARDIO [place]   4/2/2020 11:00 AM CHAIR 07 [facility]         Anat Dawkins NP          Patient note was created using MModal Dictation.  Any errors in syntax or even information may not have been identified and edited on initial review prior to signing this note.

## 2019-12-31 ENCOUNTER — TELEPHONE (OUTPATIENT)
Dept: UROLOGY | Facility: CLINIC | Age: 84
End: 2019-12-31

## 2019-12-31 ENCOUNTER — DOCUMENTATION ONLY (OUTPATIENT)
Dept: HEPATOLOGY | Facility: HOSPITAL | Age: 84
End: 2019-12-31

## 2019-12-31 NOTE — TELEPHONE ENCOUNTER
----- Message from Ambreen Bernabe sent at 12/31/2019  3:33 PM CST -----  Contact:  [facility] with [facility] - [phone]  Patient catheter is leaking. Please advise

## 2019-12-31 NOTE — TELEPHONE ENCOUNTER
Returned call, spoke with Kishan.  Catheter began leaking on yesterday, deflated balloon and repositioned.  Leakage stopped.  Today, leaking again.  Questioned if secured at connection, she said yes.  Urine in diaper.  Patient in [facility].  Please advise.

## 2020-01-06 ENCOUNTER — PATIENT OUTREACH (OUTPATIENT)
Dept: ADMINISTRATIVE | Facility: CLINIC | Age: 85
End: 2020-01-06

## 2020-01-06 NOTE — PROGRESS NOTES
[facility]                                                                              Skilled Nursing Facility                                                                  Progress Note     Admit Date:  11/12/2019  JESSICA   Principal Problem:  S/P sepsis/JAS sigmoid colon resection  HPI obtained from patient interview and chart review     Visit Date: 1/7/2020    Chief Complaint:  BP/HR monitoring, PT/OT progression    HPI:   Ms. Garcia is an 84-year-old female with a past medical history of peripheral vascular disease, renovascular hypertension, renal artery stenosis status post left renal artery stent placement on 7/19/17, coronary artery disease with history of myocardial infarction, diffuse large B cell lymphoma, anemia, epilepsy, hypothyroidism, chronic hyponatremia, depression, lumbar and sacroiliac joint osteoarthritis with chronic low back pain and history of lumbar spine surgery in 2013, slow transit constipation, with hx of small bowel resection on 8/23/16. She presented to [facility] with c/o abdominal pain, difficulty urinating, and constipation for one day. In the emergency department, she was found to have acute kidney injury (BUN 27, creatinine 1.6, from 9 and 0.7 on 8/29/19), elevated transaminases ( and , from 16 and 11 on 8/29/19), and lactic acidosis (5.8). Contrast CT showed acute enteritis, sigmoid and rectal constipation, and some peritoneal free fluid in the pericolic gutters. She was tx with IVF, and multiple antibiotics. She ultimately suffered from septic shock, was intubated, had a dialysis catheter placed due to oliguria, had a transvenous pacer placed for AF with sinus pauses, and was followed do hyponatremia in light of epilepsy hx. She also developed metabolic encephalopathy post op. She is s/p sigmoid colectomy with end colostomy after finding sigmoid  necrosis on 10/29. A Gonzales catheter was placed on 11/5/19 for urinary retention and she will follow up with Urology. The gonzales was d/anabella prior to admit to [facility], but had to be reinserted on 11/15 due to continued retention. She was found to have right vocal cord paralysis as well and ENT was consulted, who recommended at CT scan to f/u once JAS resolved. Infectious Disease recommended finishing cephalexin, metronidazole, and fluconazole on 11/15/19. SNF recommended for further care after d/c.    Patient will be treated at [facility] SNF with PT and OT to improve functional status and ability to perform ADLs.     Interval history:  /62, HR 94, stable on current regimen. No complaints during visit today, other than intermittent nausea.  Patient continued with conditions that could lend to guarded condition at times.  Will continue to monitor and treat of chronic conditions.  Progressing slowly with PT/OT.    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    Past Surgical History: Patient has a past surgical history that includes Cholecystectomy; Appendectomy; Tonsillectomy; Vaginal hysterectomy w/ anterior and posterior vaginal repair; Cataract extraction, bilateral (Bilateral); Hysterectomy; Eye surgery (Bilateral); Portacath placement (Right, 09/2016); Back surgery (1988); Back surgery (02/2013); Renal artery stent (Left, 07/19/2017); Small intestine surgery (08/23/2016); and Sigmoidectomy (10/29/2019).    Social History: Patient reports that she has never smoked. She has never used smokeless tobacco. She reports that she does not drink alcohol or use drugs.    Family History: family history includes Heart attack in her father; Hypertension in her father; Pancreatic cancer in her mother.    Allergies: Patient is allergic to  adhesive; penicillins; tramadol; avelox [moxifloxacin]; amoxil [amoxicillin]; aspridrox [aspirin, buffered]; codeine; keflex [cephalexin]; norvasc [amlodipine]; red dye; robitussin [guaifenesin]; sulfa (sulfonamide antibiotics); tylenol [acetaminophen]; and vicks vaporub [camphor-eucalyptus oil-menthol].    ROS  Constitutional: Negative for fever or fatigue, + hoarse voice , improving  Eyes: Negative for blurred vision, double vision and discharge.   Respiratory: Negative for cough, shortness of breath and wheezing.    Cardiovascular: Negative for chest pain, palpitations, and leg swelling.   Gastrointestinal: Negative for abdominal pain, constipation, diarrhea, + intermittent ausea and vomiting.   Genitourinary: Negative for dysuria, frequency and urgency.   Musculoskeletal:  + generalized weakness. Negative for back pain and myalgias, + right ankle pain, improved.   Skin: Negative for itching and rash.   Neurological: Negative for dizziness, speech change, and headaches.   Psychiatric/Behavioral: Negative for depression. The patient is not nervous/anxious.      PEx     Constitutional: Patient appears well-developed and in no distress   Head: Normocephalic and atraumatic.   Eyes: Pupils are equal, round, and reactive to light.   Neck: Normal range of motion. Neck supple.   Cardiovascular: Normal rate, regular rhythm and normal heart sounds.    Pulmonary/Chest: Effort normal and breath sounds are clear  Genitourinary:   indwelling Martinez  Abdominal: Soft. Bowel sounds are normal, +ostomy with brown stool noted, plus mild nausea  Musculoskeletal: Normal range of motion, + right ankle pain, improved   Neurological: Alert and oriented to person, place, and time.   Psychiatric: Normal mood and affect. Behavior is normal, + hoarse voice, improving.   Skin: Skin is warm and dry, + stage II pressure ulcer to coccyx, R U chest port a cath-not accessed, Abdominal surgical incision resolved    Wound evaluation completed on  12/20/2019:    Wound location:  Left lower quadrant abdominal surgical incision, resolved    Wound location:  Stage II pressure ulcer to coccyx, improving  Date identified:  11/13/2019  Present on admit:  Yes  Appearance of wound:  20% epithelial/80% granulation  Drainage characteristic:  Serosanguineous  Wound length:  1.10 cm   Wound width:  1.9 cm  Wound depth:0.20 cm   Alie wound area:  Irregular edges, scar tissue, normal skin tone   Following: This NP weekly- last observed on (12/20/19), wound care RN weekly- last observed on (12/20/19)    Assessment and Plan:    Debility r/t recent sigmoidectomy/ARF, ongoing  - Continue with PT/OT for gait training and strengthening and restoration of ADL's   - Encourage mobility, OOB in chair, and early ambulation as appropriate  - Fall precautions   - Monitor for bowel and bladder dysfunction  - Monitor for and prevent skin breakdown and pressure ulcers  - Pain control: Tylenol PRN  -1/7 Continue PT/OT    Peripheral vascular disease, unspecified, chronic  Remote Hx of MI   HTN with Hyperlipidemia, stable  -Continue Lisinopril, Coreg ( d/anabella during admit), pravastatin  -1/7 Continue current regimen    Urinary retention, ongoing  -Follow up with Urology as outpatient.   -Gonzales re inserted at facility on 11/15 for retention  -1/7 continue indwelling Gonzales, changed on 12/6 during office appt    Continued    Multiple Wounds, ongoing  1. Stage II coccyx PU  2. Abdominal surgical incision-staples removed, healing  3.R chest wall port accessed  -  RN following closely, will follow weekly  -12/20  wound assessment completed, continue with current orders    Vaginal Yeast infection, improved  -Treated with 4 day course of low dose Diflucan during admit> infection persists.   -Tx with Diflucan 150 mg PO x 1 dose today  -12/10 Urology appt on 12/6-Diflucan 100 mg PO QD x 7 days prescribed for persistent yeast infection, UC obtained during visit-awaiting results, gonzales exchanged in  office  -12/17 Diflucan completed, no further interventions required at this time    Right vocal cord paralysis with hoarse voice, improving  -ENT consulted to during admit, recommended a CT neck and chest with IV contrast once kidney function improved  -ST following    -Chloraseptic spray PRN sore throat  -12/17 Cont ST     Hypoalbuminemia, new  Hyponatremia, new  Hypophosphatemia, improved  Hypomagnesemia, improved  Hypokalemia, improved  -continue neutraphos, KCl replacement, Mg replacement  -12/10 initiate prostat 30 ml PO BID, monitor labwork    Colon necrosis, improved   S/P sigmoidectomy with ostomy placement 10/29/19  E. coli bacteremia, resolved  -Treated with Keflexand metronidazole per ID until 11/15/16  -12/5 Gen sx appt on 12/4-staples removed to abd incision, no other changes in plan of care     Right ankle pain, improved  -11/26 initiate right ankle xray  -12/3 x-ray of right ankle negative for fracture or abnormality, no further interventions needed    DLBCL (diffuse large B cell lymphoma)  Anemia due to antineoplastic chemotherapy  Thrombocytopenia, stable  -Transfused pRBCs during admit  -Patient has RU chest wall port that is accessed upon admit to facility  -Follow up with hem onc as outpatient.    Slow transit constipation, ongoing  -Continue polyethylene glycol BID (home med)    Epilepsy, chronic, stable  MDD with recurrent episodes, ongoing  -Continue home mirtazapine 7.5mg HS.  -Continue phenobarb, Trileptal     Acquired hypothyroidism, stable  -Continue home levothyroxine 125 mcg before breakfast.    Vitamin D Deficiency, stable  -Continue Vitamin D 3 replacement + Os sukhdev 1500 mg QD        Acute renal failure due to Acute tubular necrosis, resolved  DIC (disseminated intravascular coagulation), resolved    Outpatient MDs: Her primary care physician is Dr. Jose Torres. Her pain management specialist is Dr. Chirag Bates. Her neurologist is Dr. Gamal Lock.       Future  Appointments   Date Time Provider Department Center   1/7/2020 10:00 AM ECHO/HOLTER/REDD, [place] RVPH [facility]ARD[facility] [place]   1/8/2020  1:20 PM Chris Johnson MD [facility]UR [place] [facility]   1/10/2020 10:00 AM Carleen Estrada MD [facility] UROLOGY [place] [facility]   1/30/2020  3:00 PM Timmy Simmons MD [facility] CARDIO [place]   4/2/2020 11:00 AM CHAIR 07 [facility] Dawkins NP          Patient note was created using MModal Dictation.  Any errors in syntax or even information may not have been identified and edited on initial review prior to signing this note.

## 2020-01-07 ENCOUNTER — DOCUMENTATION ONLY (OUTPATIENT)
Dept: HEPATOLOGY | Facility: HOSPITAL | Age: 85
End: 2020-01-07

## 2020-01-08 ENCOUNTER — OFFICE VISIT (OUTPATIENT)
Dept: SURGERY | Facility: CLINIC | Age: 85
End: 2020-01-08
Payer: MEDICARE

## 2020-01-08 VITALS
WEIGHT: 125 LBS | BODY MASS INDEX: 26.97 KG/M2 | DIASTOLIC BLOOD PRESSURE: 80 MMHG | TEMPERATURE: 99 F | HEART RATE: 100 BPM | SYSTOLIC BLOOD PRESSURE: 143 MMHG | HEIGHT: 57 IN

## 2020-01-08 DIAGNOSIS — Z90.49 S/P PARTIAL RESECTION OF COLON: Primary | ICD-10-CM

## 2020-01-08 PROCEDURE — 99213 OFFICE O/P EST LOW 20 MIN: CPT | Mod: PBBFAC,PO | Performed by: STUDENT IN AN ORGANIZED HEALTH CARE EDUCATION/TRAINING PROGRAM

## 2020-01-08 PROCEDURE — 99999 PR PBB SHADOW E&M-EST. PATIENT-LVL III: CPT | Mod: PBBFAC,,, | Performed by: STUDENT IN AN ORGANIZED HEALTH CARE EDUCATION/TRAINING PROGRAM

## 2020-01-08 PROCEDURE — 99999 PR PBB SHADOW E&M-EST. PATIENT-LVL III: ICD-10-PCS | Mod: PBBFAC,,, | Performed by: STUDENT IN AN ORGANIZED HEALTH CARE EDUCATION/TRAINING PROGRAM

## 2020-01-08 PROCEDURE — 99024 POSTOP FOLLOW-UP VISIT: CPT | Mod: POP,,, | Performed by: STUDENT IN AN ORGANIZED HEALTH CARE EDUCATION/TRAINING PROGRAM

## 2020-01-08 PROCEDURE — 99024 PR POST-OP FOLLOW-UP VISIT: ICD-10-PCS | Mod: POP,,, | Performed by: STUDENT IN AN ORGANIZED HEALTH CARE EDUCATION/TRAINING PROGRAM

## 2020-01-08 NOTE — PROGRESS NOTES
[facility]  Post Op Visit    SUBJECTIVE:  The patient is a 84 y.o. y/o female s/p sigmoid colectomy with end colostomy 10/30/19. She returns for her 1 month follow up. At last visit her voice is slowly returning/stronger overall, working with PT 3x daily. She was eating smaller amounts of food and had good ostomy function. Today she reports that her portion sizes have improved although still intermittently not taking in much PO. Her weight today is 125 lbs, she reports her pre surgical weight was 123 lbs. She has been downgraded to PT 3 x a week when staff is available instead of daily likely due to insurance. States she still needs assistance with ambulation. Her ostomy is functioning well. Although she complains of irritation to the surrounding skin. Remains with a gonzales per urology who would like to see her mobility improved in order to strengthen her pelvic floor muscles. Her voice has returned to normal.    OBJECTIVE:  Vitals:    01/08/20 1322   BP: (!) 143/80   Pulse: 100   Temp: 98.6 °F (37 °C)       GEN: female in NAD, sitting in wheelchair  ABD: soft, non-tender, non-distended. Midline incision well healed without erythema/drainage.  INCISIONS: healing well without signs of infection. Ostomy pink/healthy appearing. Light tan semi formed stool in bag. Mild erythema of skin edges around ostomy (hole is cut to large)      ASSESSMENT/PLAN:  Doing well s/p sigmoid colectomy with end colostomy 10/30/19.   Continue working with physical therapy as much as possible. If no physical therapist have the nurses assist her with ambulation. Counseled that even being in the wheelchair is better for her than laying in bed all day.  Continue diet as tolerated. Incorporate boost/ensure in diet as tolerated.  Follow-up in clinic as needed. No plans for ostomy reversal.  All questions answered; patient, son, and daughter are comfortable with the above follow-up plan.    Esteban Alford MD  Surgery,  [id number]  [phone]

## 2020-01-10 ENCOUNTER — OFFICE VISIT (OUTPATIENT)
Dept: UROLOGY | Facility: CLINIC | Age: 85
End: 2020-01-10
Payer: MEDICARE

## 2020-01-10 VITALS — HEART RATE: 58 BPM | TEMPERATURE: 98 F | DIASTOLIC BLOOD PRESSURE: 75 MMHG | SYSTOLIC BLOOD PRESSURE: 130 MMHG

## 2020-01-10 DIAGNOSIS — R33.9 RETENTION, URINE: Primary | ICD-10-CM

## 2020-01-10 PROCEDURE — 1159F PR MEDICATION LIST DOCUMENTED IN MEDICAL RECORD: ICD-10-PCS | Mod: ,,, | Performed by: STUDENT IN AN ORGANIZED HEALTH CARE EDUCATION/TRAINING PROGRAM

## 2020-01-10 PROCEDURE — 1159F MED LIST DOCD IN RCRD: CPT | Mod: ,,, | Performed by: STUDENT IN AN ORGANIZED HEALTH CARE EDUCATION/TRAINING PROGRAM

## 2020-01-10 PROCEDURE — 99999 PR PBB SHADOW E&M-EST. PATIENT-LVL III: ICD-10-PCS | Mod: PBBFAC,,, | Performed by: STUDENT IN AN ORGANIZED HEALTH CARE EDUCATION/TRAINING PROGRAM

## 2020-01-10 PROCEDURE — 87077 CULTURE AEROBIC IDENTIFY: CPT

## 2020-01-10 PROCEDURE — 99999 PR PBB SHADOW E&M-EST. PATIENT-LVL III: CPT | Mod: PBBFAC,,, | Performed by: STUDENT IN AN ORGANIZED HEALTH CARE EDUCATION/TRAINING PROGRAM

## 2020-01-10 PROCEDURE — 87088 URINE BACTERIA CULTURE: CPT

## 2020-01-10 PROCEDURE — 99214 PR OFFICE/OUTPT VISIT, EST, LEVL IV, 30-39 MIN: ICD-10-PCS | Mod: S$PBB,25,, | Performed by: STUDENT IN AN ORGANIZED HEALTH CARE EDUCATION/TRAINING PROGRAM

## 2020-01-10 PROCEDURE — 87086 URINE CULTURE/COLONY COUNT: CPT

## 2020-01-10 PROCEDURE — 1126F PR PAIN SEVERITY QUANTIFIED, NO PAIN PRESENT: ICD-10-PCS | Mod: ,,, | Performed by: STUDENT IN AN ORGANIZED HEALTH CARE EDUCATION/TRAINING PROGRAM

## 2020-01-10 PROCEDURE — 99214 OFFICE O/P EST MOD 30 MIN: CPT | Mod: S$PBB,25,, | Performed by: STUDENT IN AN ORGANIZED HEALTH CARE EDUCATION/TRAINING PROGRAM

## 2020-01-10 PROCEDURE — 87186 SC STD MICRODIL/AGAR DIL: CPT

## 2020-01-10 PROCEDURE — 51702 INSERT TEMP BLADDER CATH: CPT | Mod: PBBFAC,PO | Performed by: STUDENT IN AN ORGANIZED HEALTH CARE EDUCATION/TRAINING PROGRAM

## 2020-01-10 PROCEDURE — 99213 OFFICE O/P EST LOW 20 MIN: CPT | Mod: PBBFAC,PO,25 | Performed by: STUDENT IN AN ORGANIZED HEALTH CARE EDUCATION/TRAINING PROGRAM

## 2020-01-10 PROCEDURE — 1126F AMNT PAIN NOTED NONE PRSNT: CPT | Mod: ,,, | Performed by: STUDENT IN AN ORGANIZED HEALTH CARE EDUCATION/TRAINING PROGRAM

## 2020-01-10 PROCEDURE — 51702 PR INSERTION OF TEMPORARY INDWELLING BLADDER CATHETER, SIMPLE: ICD-10-PCS | Mod: S$PBB,,, | Performed by: STUDENT IN AN ORGANIZED HEALTH CARE EDUCATION/TRAINING PROGRAM

## 2020-01-10 PROCEDURE — 51702 INSERT TEMP BLADDER CATH: CPT | Mod: S$PBB,,, | Performed by: STUDENT IN AN ORGANIZED HEALTH CARE EDUCATION/TRAINING PROGRAM

## 2020-01-10 NOTE — PROGRESS NOTES
Subjective:       Patient ID: Annette Garcia is a 84 y.o. female.    Chief Complaint:  Voiding trial discussion  This is a 84 y.o.  female patient that is an established patient of mine. The patient is referred to me by by [facility] hospitalist for a voiding trial. 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 Since that time she has undergone a long and arduous prolonged recovery process.    1/10/2020  She returns back for a discussion about voiding trial versus gonzales catheter exchange. She has a colostomy. She is hydrating at nursing home. Still quite debilitated, most of the time in the bed or in chair. She states it is hard to get help at the nursing home for ambulation.      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    In the setting of septic shock and near death    Petit mal epilepsy 1954    Scoliosis of lumbar spine     Seizures     Unspecified hypothyroidism        Past Surgical History:   Procedure Laterality Date    APPENDECTOMY      BACK SURGERY  1988    vertebral fracture    BACK SURGERY  02/2013    lumbar L2-5    CATARACT EXTRACTION, BILATERAL Bilateral     CHOLECYSTECTOMY      open    EYE SURGERY Bilateral     cataract removal with lens implant    HYSTERECTOMY      PORTACATH PLACEMENT Right 09/2016    RENAL ARTERY STENT Left 07/19/2017    SIGMOIDECTOMY  10/29/2019    SMALL INTESTINE SURGERY  08/23/2016    TONSILLECTOMY      VAGINAL HYSTERECTOMY W/ ANTERIOR AND POSTERIOR VAGINAL REPAIR         Family History   Problem Relation Age of Onset    Pancreatic cancer Mother     Hypertension Father      Heart attack Father        Social History     Tobacco Use    Smoking status: Never Smoker    Smokeless tobacco: Never Used   Substance Use Topics    Alcohol use: No    Drug use: No       Current Outpatient Medications on File Prior to Visit   Medication Sig Dispense Refill    acetaminophen (TYLENOL) 325 MG tablet Take 2 tablets (650 mg total) by mouth every 4 (four) hours as needed for Pain.  0    calcium carbonate (OS-RICHARDSON) 600 mg calcium (1,500 mg) Tab Take 0.5 tablets (300 mg total) by mouth once daily.  0    cloNIDine (CATAPRES) 0.2 MG tablet TAKE 1 TABLET (0.2 MG TOTAL) BY MOUTH 2 (TWO) TIMES DAILY. 180 tablet 3    levothyroxine (SYNTHROID) 125 MCG tablet TAKE 1 TABLET (125 MCG TOTAL) BY MOUTH ONCE DAILY. 90 tablet 3    lisinopril (PRINIVIL,ZESTRIL) 40 MG tablet TAKE 1 TABLET BY MOUTH EVERY DAY 90 tablet 3    magnesium 250 mg Tab Take 1 tablet (250 mg total) by mouth once daily. (Patient taking differently: Take 400 mg by mouth 2 (two) times daily. )  0    mirtazapine (REMERON) 7.5 MG Tab Take 1 tablet (7.5 mg total) by mouth every evening. 30 tablet 11    ondansetron (ZOFRAN) 4 MG tablet Take 1 tablet (4 mg total) by mouth every 8 (eight) hours as needed for Nausea. 25 tablet 2    OXcarbazepine (TRILEPTAL) 300 MG Tab Take 1 tablet (300 mg total) by mouth nightly. 90 tablet 3    PHENobarbital (LUMINAL) 64.8 MG tablet Take 1 tablet (64.8 mg total) by mouth every evening. 90 tablet 5    polyethylene glycol (GLYCOLAX) 17 gram PwPk Take 17 g by mouth 2 (two) times daily as needed (Constipation).  0    pravastatin (PRAVACHOL) 20 MG tablet Take 1 tablet (20 mg total) by mouth once daily. 90 tablet 3    vitamin D (VITAMIN D3) 1000 units Tab Take 1,000 Units by mouth once daily.       No current facility-administered medications on file prior to visit.        Review of patient's allergies indicates:   Allergen Reactions    Adhesive Itching and Blisters    Penicillins Anaphylaxis    Tramadol Hives     Avelox [moxifloxacin] Rash     Facial and arm itching and redness. Pt states throat closes when given.    Amoxil [amoxicillin]     Aspridrox [aspirin, buffered]     Codeine Other (See Comments)     Throat swelling    Keflex [cephalexin]      Tolerated cefepime and cefazolin    Norvasc [amlodipine]     Red dye Hives    Robitussin [guaifenesin]     Sulfa (sulfonamide antibiotics)     Tylenol [acetaminophen]      Has reaction to Tylenol with red dye and unable to take Extra Strength Tylenol/ CAN ONLY TOLERATE REG STRENGTH TYLENOL    Vicks vaporub [camphor-eucalyptus oil-menthol]        Review of Systems   Constitutional: Negative for activity change.   HENT: Negative for congestion.    Eyes: Negative for visual disturbance.   Respiratory: Negative for shortness of breath.    Cardiovascular: Negative for chest pain.   Gastrointestinal: Negative for abdominal distention.   Musculoskeletal: Negative for gait problem.   Skin: Negative for color change.   Neurological: Negative for dizziness.   Psychiatric/Behavioral: Negative for agitation.       Objective:      Physical Exam   Constitutional: She is oriented to person, place, and time. She appears well-developed.   HENT:   Head: Normocephalic and atraumatic.   Eyes: EOM are normal.   Neck: Normal range of motion.   Cardiovascular: Intact distal pulses.   Pulmonary/Chest: Effort normal.   Genitourinary:   Genitourinary Comments: 16 [place] citizen gonzales, yellow cloudy urine in tubing - exchanged for new 16 [place] citizen gonzales by my nurse Malka using sterile technique. Catheterized urine from new gonzales will be sent for culture   Musculoskeletal: Normal range of motion.   Neurological: She is alert and oriented to person, place, and time.   Skin: Skin is warm and dry.   Psychiatric: She has a normal mood and affect.       Assessment:       1. Retention, urine        Plan:       1. Patient decided that due to her poor mobility status and that she is afraid she may not get help  as soon as she needs it to toilet or to get a bed pan she would rather the gonzales catheter be exchanged today and not removed.  2. Catheterized urine sample from new gonzales sent for culture.  3. RTC in 1 month to see Ro Dumont, my nurse practitioner who works with me at [facility] to discuss voiding trial versus gonzales exchange.     Retention, urine  -     Urine culture

## 2020-01-12 LAB — BACTERIA UR CULT: ABNORMAL

## 2020-01-13 ENCOUNTER — PATIENT MESSAGE (OUTPATIENT)
Dept: UROLOGY | Facility: CLINIC | Age: 85
End: 2020-01-13

## 2020-01-13 RX ORDER — TRIMETHOPRIM 100 MG/1
100 TABLET ORAL EVERY 12 HOURS
Qty: 10 TABLET | Refills: 0
Start: 2020-01-13 | End: 2020-01-18

## 2020-01-17 ENCOUNTER — DOCUMENTATION ONLY (OUTPATIENT)
Dept: HEPATOLOGY | Facility: HOSPITAL | Age: 85
End: 2020-01-17

## 2020-01-17 ENCOUNTER — HOSPITAL ENCOUNTER (OUTPATIENT)
Dept: CARDIOLOGY | Facility: HOSPITAL | Age: 85
Discharge: HOME | End: 2020-01-17
Attending: INTERNAL MEDICINE
Payer: MEDICARE

## 2020-01-17 DIAGNOSIS — R55 SYNCOPE AND COLLAPSE: ICD-10-CM

## 2020-01-17 PROCEDURE — 93227 HOLTER MONITOR - 48 HOUR (CUPID ONLY): ICD-10-PCS | Mod: ,,, | Performed by: INTERNAL MEDICINE

## 2020-01-17 PROCEDURE — 93226 XTRNL ECG REC<48 HR SCAN A/R: CPT | Mod: PO

## 2020-01-17 PROCEDURE — 93227 XTRNL ECG REC<48 HR R&I: CPT | Mod: ,,, | Performed by: INTERNAL MEDICINE

## 2020-01-17 NOTE — PROGRESS NOTES
[facility]                                                                                     Long-term care                                                                 Progress Note      Visit Date: 1/17/2020    Chief Complaint:  Decline in wound, BP/HR monitoring, UTI treatment     HPI:   Patient seen today to evaluate coccyx pressure ulcer, wound has declined since last evaluated, now a stage III with granulation/slough present.  WC RN at facility continuing aggressive wound care, will follow weekly due to decline.  Of note, patient is very debilitated due to multiple comorbidities.  Discussed importance of turning every 2 hr to distribute weight, patient verbalized understanding.  Patient went to Urology appointment on 01/10, Martinez catheter exchanged at that time.  Urine culture was obtained due to chronic UTIs, positive for Proteus mirabilis.  Urology ordered trimethoprim x5 days for treatment of UTI with end date of 01/19.  Patient has no complaints today during visit.  /60, HR 92, controlled on current cardiac regimen.  She has transitioned well to long-term care at this facility.  She has multiple upcoming appointments for her chronic conditions managed by her family.  No changes needed to management of chronic conditions.  Will continue to monitor closely.    Past Medical History: Patient has a past medical history of Allergy, Cancer, Disorder of kidney and ureter, Hypertension, Lone atrial fibrillation (10/30/2019), Petit mal epilepsy (1954), Scoliosis of lumbar spine, Seizures, and Unspecified hypothyroidism.    Past Surgical History: Patient has a past surgical history that includes Cholecystectomy; Appendectomy; Tonsillectomy; Vaginal hysterectomy w/ anterior and posterior vaginal repair; Cataract extraction, bilateral (Bilateral); Hysterectomy; Eye surgery (Bilateral);  Portacath placement (Right, 09/2016); Back surgery (1988); Back surgery (02/2013); Renal artery stent (Left, 07/19/2017); Small intestine surgery (08/23/2016); and Sigmoidectomy (10/29/2019).    Social History: Patient reports that she has never smoked. She has never used smokeless tobacco. She reports that she does not drink alcohol or use drugs.    Family History: family history includes Heart attack in her father; Hypertension in her father; Pancreatic cancer in her mother.    Allergies: Patient is allergic to adhesive; penicillins; tramadol; avelox [moxifloxacin]; amoxil [amoxicillin]; aspridrox [aspirin, buffered]; codeine; keflex [cephalexin]; norvasc [amlodipine]; red dye; robitussin [guaifenesin]; sulfa (sulfonamide antibiotics); tylenol [acetaminophen]; and vicks vaporub [camphor-eucalyptus oil-menthol].    ROS  Constitutional: Negative for fever or fatigue, + hoarse voice , improving  Eyes: Negative for blurred vision, double vision and discharge.   Respiratory: Negative for cough, shortness of breath and wheezing.    Cardiovascular: Negative for chest pain, palpitations, and leg swelling.   Gastrointestinal: Negative for abdominal pain, constipation, diarrhea, + intermittent ausea and vomiting.   Genitourinary: Negative for dysuria, frequency and urgency.   Musculoskeletal:  + generalized weakness. Negative for back pain and myalgias  Skin: Negative for itching and rash, +stage III coccyx pressure ulcer.   Neurological: Negative for dizziness, speech change, and headaches.   Psychiatric/Behavioral: Negative for depression. The patient is not nervous/anxious.      PEx     Constitutional: Patient appears well-developed and in no distress   Head: Normocephalic and atraumatic.   Eyes: Pupils are equal, round, and reactive to light.   Neck: Normal range of motion. Neck supple.   Cardiovascular: Normal rate, regular rhythm and normal heart sounds.    Pulmonary/Chest: Effort normal and breath sounds are  clear  Genitourinary:   indwelling Martinez  Abdominal: Soft. Bowel sounds are normal, +ostomy with brown stool noted  Musculoskeletal: Normal range of motion  Neurological: Alert and oriented to person, place, and time.   Psychiatric: Normal mood and affect. Behavior is normal, + hoarse voice, improving.   Skin: Skin is warm and dry, + stage III pressure ulcer to coccyx (previously stage II), R U chest port a cath-not accessed    Wound evaluation completed on 1/17/20:    Wound location:  Stage III coccyx pressure ulcer, (previously called Stage II pressure ulcer to coccyx), declined  Date identified:  11/13/2019  Present on admit:  Yes  Appearance of wound:  50% granulation/50% slough  Drainage characteristic:  Serosanguineous  Wound length:  1.20 cm   Wound width:  0.60 cm  Wound depth:0.10 cm   Alie wound area:  Irregular edges, scar tissue, normal skin tone   Following: This NP weekly- last observed on (01/17/2020), wound care RN weekly- last observed on (01/17/2020)    Assessment and Plan:    Debility r/t recent sigmoidectomy/ARF, ongoing  - Encourage mobility, OOB in chair, and early ambulation as appropriate  - Fall precautions   - Monitor for bowel and bladder dysfunction  - Monitor for and prevent skin breakdown and pressure ulcers  - Pain control: Tylenol PRN  -1/17 Continue restorative regimen for debility    Peripheral vascular disease, unspecified, chronic  Remote Hx of MI   HTN with Hyperlipidemia, stable  -Continue Lisinopril, Coreg ( d/anabella during admit), pravastatin  -1/17 Continue current regimen    Proteus mirabilis UTI, new  Urinary retention, ongoing  -Follow up with Urology as outpatient.   -Martinez re inserted at facility on 11/15 for retention  -1/17 continuing on trimethoprim ordered by Urology for positive UC-end date 01/19, continue indwelling Martinez, changed on 1/10 during office appt    Multiple Wounds, ongoing  1.  Stage III coccyx pressure ulcer ( previously called Stage II coccyx PU)  2.  Abdominal surgical incision-staples removed, resolved  3.R chest wall port accessed-de accessed  - WC RN following closely, will follow weekly  -1/17  wound assessment completed, continue with current orders    Right vocal cord paralysis with hoarse voice, improving  -ENT consulted to during admit, recommended a CT neck and chest with IV contrast once kidney function improved  -ST following ,Chloraseptic spray PRN sore throat  -1/17 Cont ST     Hypoalbuminemia, new  Hyponatremia, new  Hypophosphatemia, improved  Hypomagnesemia, improved  Hypokalemia, improved  -continue neutraphos, KCl replacement, Mg replacement  -12/10 initiate prostat 30 ml PO BID, monitor labwork  -1/17 continue ProStat, vitamin/mineral replacement    Continued    S/P sigmoidectomy with ostomy placement 10/29/19  Colon necrosis, improved   E. coli bacteremia, resolved  -Previously Treated with Keflex and metronidazole per ID until 11/15/16  -Continue ostomy care    DLBCL (diffuse large B cell lymphoma)  Anemia due to antineoplastic chemotherapy  Thrombocytopenia, stable  -Transfused pRBCs during admit  -Patient has RU chest wall port  -Follow up with hem onc as outpatient.    Slow transit constipation, ongoing  -Continue polyethylene glycol BID (home med)    Epilepsy, chronic, stable  MDD with recurrent episodes, ongoing  -Continue home mirtazapine 7.5mg HS.  -Continue phenobarb, Trileptal     Acquired hypothyroidism, stable  -most recent TSH 0.507 11/2019  -Continue home levothyroxine 125 mcg before breakfast.    Vitamin D Deficiency, stable  -most recent vitamin-D level 16 02/2019  -Continue Vitamin D 3 replacement + Os sukhdev 1500 mg QD        Right ankle pain, improved  -12/3 x-ray of right ankle negative for fracture or abnormality, no further interventions needed    Vaginal Yeast infection, improved  -Treated with  Diflucan      Outpatient MDs: Her primary care physician is Dr. Jose Torres. Her pain management specialist is Dr. Beard  Alex Bates. Her neurologist is Dr. Gamal Lock.       Future Appointments   Date Time Provider Department Center   1/17/2020  2:00 PM ECHO/HOLTER/REDD, [facility] RVPH [facility]   1/29/2020 11:00 AM Ro Dumont NP [facility] UROLOGY [facility]   1/30/2020  3:00 PM Timmy Simmons MD [facility] CARDIO [place]   4/2/2020 11:00 AM CHAIR [address] [place] [address] CHEMO [facility]         Anat Dawkins NP          Patient note was created using MModal Dictation.  Any errors in syntax or even information may not have been identified and edited on initial review prior to signing this note.

## 2020-01-21 LAB
OHS CV EVENT MONITOR DAY: 0
OHS CV HOLTER LENGTH DECIMAL HOURS: 48
OHS CV HOLTER LENGTH HOURS: 48
OHS CV HOLTER LENGTH MINUTES: 0

## 2020-01-23 NOTE — PROGRESS NOTES
[facility]                                                                                     Long-term care                                                                 Progress Note      Visit Date: 1/24/2020    Chief Complaint:  Wound evaluation, BP/HR monitoring, UTI treatment completed, chronic condition monitoring    HPI:   Patient seen today to evaluate coccyx pressure ulcer, wound has improved since last evaluated. WC RN at facility continuing aggressive wound care, will continue to follow weekly.  Of note, patient is very debilitated due to multiple comorbidities.  Antibiotic treatment with trimethoprim x5 days for treatment of UTI has completed, no further treatment indicated.  Patient continues with indwelling Martinez.  Patient has no complaints today during visit.  /81, HR 93, controlled on current cardiac regimen.  Patient had recent cardiology appointment, Holter monitor applied for 48 hr, discontinued per their order on 01/19, awaiting final results.  Patient is on several medications that need intermittent monitoring, will order lab work appropriately.  She has multiple upcoming appointments for her chronic conditions managed by her family.  No changes needed to management of chronic conditions.  Will continue to monitor closely.    Past Medical History: Patient has a past medical history of Allergy, Cancer, Disorder of kidney and ureter, Hypertension, Lone atrial fibrillation (10/30/2019), Petit mal epilepsy (1954), Scoliosis of lumbar spine, Seizures, and Unspecified hypothyroidism.    Past Surgical History: Patient has a past surgical history that includes Cholecystectomy; Appendectomy; Tonsillectomy; Vaginal hysterectomy w/ anterior and posterior vaginal repair; Cataract extraction, bilateral (Bilateral); Hysterectomy; Eye surgery (Bilateral); Portacath placement  (Right, 09/2016); Back surgery (1988); Back surgery (02/2013); Renal artery stent (Left, 07/19/2017); Small intestine surgery (08/23/2016); and Sigmoidectomy (10/29/2019).    Social History: Patient reports that she has never smoked. She has never used smokeless tobacco. She reports that she does not drink alcohol or use drugs.    Family History: family history includes Heart attack in her father; Hypertension in her father; Pancreatic cancer in her mother.    Allergies: Patient is allergic to adhesive; penicillins; tramadol; avelox [moxifloxacin]; amoxil [amoxicillin]; aspridrox [aspirin, buffered]; codeine; keflex [cephalexin]; norvasc [amlodipine]; red dye; robitussin [guaifenesin]; sulfa (sulfonamide antibiotics); tylenol [acetaminophen]; and vicks vaporub [camphor-eucalyptus oil-menthol].    ROS  Constitutional: Negative for fever or fatigue, + hoarse voice , improved  Eyes: Negative for blurred vision, double vision and discharge.   Respiratory: Negative for cough, shortness of breath and wheezing.    Cardiovascular: Negative for chest pain, palpitations, and leg swelling.   Gastrointestinal: Negative for abdominal pain, constipation, diarrhea, + intermittent nausea and vomiting.   Genitourinary: Negative for dysuria, frequency and urgency.   Musculoskeletal:  + generalized weakness. Negative for back pain and myalgias  Skin: Negative for itching and rash, +stage III coccyx pressure ulcer., improving   Neurological: Negative for dizziness, speech change, and headaches.   Psychiatric/Behavioral: Negative for depression. The patient is not nervous/anxious.      PEx     Constitutional: Patient appears well-developed and in no distress   Head: Normocephalic and atraumatic.   Eyes: Pupils are equal, round, and reactive to light.   Neck: Normal range of motion. Neck supple.   Cardiovascular: Normal rate, regular rhythm and normal heart sounds.    Pulmonary/Chest: Effort normal and breath sounds are  clear  Genitourinary:   indwelling Martinez  Abdominal: Soft. Bowel sounds are normal, +ostomy with brown stool noted  Musculoskeletal: Normal range of motion  Neurological: Alert and oriented to person, place, and time.   Psychiatric: Normal mood and affect. Behavior is normal, + hoarse voice, improved   Skin: Skin is warm and dry, + stage III pressure ulcer to coccyx (previously stage II), R U chest port a cath- accessed    Wound evaluation completed on 1/24/20:    Wound location:  Stage III coccyx pressure ulcer, (previously called Stage II pressure ulcer to coccyx), improving  Date identified:  11/13/2019  Present on admit:  Yes  Appearance of wound:  50% granulation/50% slough  Drainage characteristic:  Serosanguineous  Wound length:  0.90 cm   Wound width:  0.30 cm  Wound depth:0.20 cm   Alie wound area:  Irregular edges, scar tissue, normal skin tone   Following: This NP weekly- last observed on (01/24/2020), wound care RN weekly- last observed on (01/24/2020)    Assessment and Plan:    Debility r/t recent sigmoidectomy/ARF, ongoing  - Encourage mobility, OOB in chair, and early ambulation as appropriate  - Fall precautions   - Monitor for bowel and bladder dysfunction  - Monitor for and prevent skin breakdown and pressure ulcers  - Pain control: Tylenol PRN  -1/24 Continue restorative regimen for debility    Peripheral vascular disease, unspecified, chronic  Remote Hx of MI   HTN with Hyperlipidemia, stable  -LDL 97 11/2019  -Continue Lisinopril, Coreg ( d/anabella during admit), pravastatin  -1/24 Continue current regimen    Proteus mirabilis UTI, resolved  Urinary retention, ongoing  -Follow up with Urology as outpatient.   -Martinez re inserted at facility on 11/15 for retention  -1/17 continuing on trimethoprim ordered by Urology for positive UC-end date 01/19, continue indwelling Martinez, changed on 1/10 during office appt  -1/24 antibiotic completed, continue indwelling Martinez    Multiple Wounds, ongoing  1.  Stage  III coccyx pressure ulcer ( previously called Stage II coccyx PU)  2. Abdominal surgical incision-staples removed, resolved  3.R chest wall port accessed-de accessed  - SMILEY RN following closely, will follow weekly  -1/24 wound assessment completed, continue with current orders    Hypoalbuminemia, new  Hyponatremia, new  Hypophosphatemia, improved  Hypomagnesemia, improved  Hypokalemia, improved  -continue neutraphos, KCl replacement, Mg replacement  -12/10 initiate prostat 30 ml PO BID, monitor labwork  -1/24 continue ProStat, vitamin/mineral replacement    Vitamin D Deficiency, stable  -most recent vitamin-D level 16 02/2019  -Continue Vitamin D 3 replacement + Os sukhdev 1500 mg QD  -1/24 initiate vitamin-D level    Epilepsy, chronic, stable  MDD with recurrent episodes, ongoing  -Continue home mirtazapine 7.5mg HS.  -Continue phenobarb, Trileptal  -1/24 initiate phenobarb level    Acquired hypothyroidism, stable  -most recent TSH 0.507 11/2019 (10.32 previously)  -Continue home levothyroxine 125 mcg before breakfast.  -1/24 initiate TSH, T4 repeat in light of recent elevation    Continued    S/P sigmoidectomy with ostomy placement 10/29/19  Colon necrosis, improved   E. coli bacteremia, resolved  -Previously Treated with Keflex and metronidazole per ID until 11/15/16  -Continue ostomy care    DLBCL (diffuse large B cell lymphoma)  Anemia due to antineoplastic chemotherapy  Thrombocytopenia, stable  -most recent H&H 9/28 11/2019  -Transfused pRBCs during admit  -Patient has RU chest wall port  -Follow up with hem onc as outpatient.    Slow transit constipation, ongoing  -Continue polyethylene glycol BID (home med)        Right vocal cord paralysis with hoarse voice, improved  -ENT consulted to during admit, recommended a CT neck and chest with IV contrast once kidney function improved  -ST following ,Chloraseptic spray PRN sore throat  -1/17 Cont ST         Right ankle pain, improved  -12/3 x-ray of right ankle  negative for fracture or abnormality, no further interventions needed    Vaginal Yeast infection, improved  -Treated with  Diflucan      Outpatient MDs: Her primary care physician is Dr. Jose Torres. Her pain management specialist is Dr. Chirag Bates. Her neurologist is Dr. Gamal Lock.       Future Appointments   Date Time Provider Department Center   1/29/2020 11:00 AM Ro Dumont NP [facility] UROLOGY [facility] [facility]   1/30/2020  3:00 PM Timmy Simmons MD [facility] CARDIO [place]   4/2/2020 11:00 AM CHAIR [facility]         Anat Dawkins NP          Patient note was created using MModal Dictation.  Any errors in syntax or even information may not have been identified and edited on initial review prior to signing this note.

## 2020-01-24 ENCOUNTER — DOCUMENTATION ONLY (OUTPATIENT)
Dept: HEPATOLOGY | Facility: HOSPITAL | Age: 85
End: 2020-01-24

## 2020-01-29 ENCOUNTER — OFFICE VISIT (OUTPATIENT)
Dept: UROLOGY | Facility: CLINIC | Age: 85
End: 2020-01-29
Payer: MEDICARE

## 2020-01-29 VITALS — HEART RATE: 89 BPM | SYSTOLIC BLOOD PRESSURE: 143 MMHG | TEMPERATURE: 98 F | DIASTOLIC BLOOD PRESSURE: 82 MMHG

## 2020-01-29 DIAGNOSIS — R33.9 URINARY RETENTION: Primary | ICD-10-CM

## 2020-01-29 DIAGNOSIS — Z97.8 FOLEY CATHETER IN PLACE: ICD-10-CM

## 2020-01-29 PROCEDURE — 1159F MED LIST DOCD IN RCRD: CPT | Mod: ,,, | Performed by: NURSE PRACTITIONER

## 2020-01-29 PROCEDURE — 99999 PR PBB SHADOW E&M-EST. PATIENT-LVL III: CPT | Mod: PBBFAC,,, | Performed by: NURSE PRACTITIONER

## 2020-01-29 PROCEDURE — 99212 OFFICE O/P EST SF 10 MIN: CPT | Mod: S$PBB,,, | Performed by: NURSE PRACTITIONER

## 2020-01-29 PROCEDURE — 99212 PR OFFICE/OUTPT VISIT, EST, LEVL II, 10-19 MIN: ICD-10-PCS | Mod: S$PBB,,, | Performed by: NURSE PRACTITIONER

## 2020-01-29 PROCEDURE — 1159F PR MEDICATION LIST DOCUMENTED IN MEDICAL RECORD: ICD-10-PCS | Mod: ,,, | Performed by: NURSE PRACTITIONER

## 2020-01-29 PROCEDURE — 1126F PR PAIN SEVERITY QUANTIFIED, NO PAIN PRESENT: ICD-10-PCS | Mod: ,,, | Performed by: NURSE PRACTITIONER

## 2020-01-29 PROCEDURE — 1126F AMNT PAIN NOTED NONE PRSNT: CPT | Mod: ,,, | Performed by: NURSE PRACTITIONER

## 2020-01-29 PROCEDURE — 99213 OFFICE O/P EST LOW 20 MIN: CPT | Mod: PBBFAC,PO | Performed by: NURSE PRACTITIONER

## 2020-01-29 PROCEDURE — 99999 PR PBB SHADOW E&M-EST. PATIENT-LVL III: ICD-10-PCS | Mod: PBBFAC,,, | Performed by: NURSE PRACTITIONER

## 2020-01-29 RX ORDER — ASCORBIC ACID 500 MG
500 TABLET ORAL DAILY
Status: ON HOLD | COMMUNITY
End: 2020-09-14 | Stop reason: ALTCHOICE

## 2020-01-29 NOTE — PROGRESS NOTES
Subjective:       Patient ID: Annette Garcia is a 84 y.o. female.    Chief Complaint: Follow-up (Voiding trail discussion)     This is a 84 y.o.  female patient that is new to me but not new to the system.  The patient is here to follow-up on voiding trial discussion. 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 She last saw Dr. Estrada on 1/10/2020, patient elected to have the gonzales exchanged and not removed. Culture at that time showed PROTEUS MIRABILIS   10,000 - 49,999 cfu/ml and she was started on Trimethoprim x 5 days.     Patient and her daughter, Beatris are here to discuss voiding trial. Gonzales has been draining well, she finished antibiotics. Beatris reports that the nursing home has not been getting patient up or doing exercises. She believes this is because they are waiting on cardiology clearance. She has cardiology appointment tomorrow.        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    In the setting of septic shock and near death    Petit mal epilepsy 1954    Scoliosis of lumbar spine     Seizures     Unspecified hypothyroidism        Past Surgical History:   Procedure Laterality Date    APPENDECTOMY      BACK SURGERY  1988    vertebral fracture    BACK SURGERY  02/2013    lumbar L2-5    CATARACT EXTRACTION, BILATERAL Bilateral     CHOLECYSTECTOMY      open    EYE SURGERY Bilateral     cataract removal with lens implant    HYSTERECTOMY      PORTACATH PLACEMENT Right 09/2016    RENAL ARTERY STENT Left 07/19/2017    SIGMOIDECTOMY  10/29/2019    SMALL INTESTINE SURGERY  08/23/2016    TONSILLECTOMY       VAGINAL HYSTERECTOMY W/ ANTERIOR AND POSTERIOR VAGINAL REPAIR         Family History   Problem Relation Age of Onset    Pancreatic cancer Mother     Hypertension Father     Heart attack Father        Social History     Tobacco Use    Smoking status: Never Smoker    Smokeless tobacco: Never Used   Substance Use Topics    Alcohol use: No    Drug use: No       Current Outpatient Medications on File Prior to Visit   Medication Sig Dispense Refill    acetaminophen (TYLENOL) 325 MG tablet Take 2 tablets (650 mg total) by mouth every 4 (four) hours as needed for Pain.  0    ascorbic acid, vitamin C, (VITAMIN C) 500 MG tablet Take 500 mg by mouth once daily.      calcium carbonate (OS-RICHARDSON) 600 mg calcium (1,500 mg) Tab Take 0.5 tablets (300 mg total) by mouth once daily.  0    cloNIDine (CATAPRES) 0.2 MG tablet TAKE 1 TABLET (0.2 MG TOTAL) BY MOUTH 2 (TWO) TIMES DAILY. 180 tablet 3    heparin sod,pork in 0.45% NaCl (HEPARIN, PORCINE, 100 UNITS) 100 unit/100 mL (1 unit/mL) SolP in sodium chloride 0.45 % 100 mL infusion Inject into the vein continuous.      levothyroxine (SYNTHROID) 125 MCG tablet TAKE 1 TABLET (125 MCG TOTAL) BY MOUTH ONCE DAILY. 90 tablet 3    lisinopril (PRINIVIL,ZESTRIL) 40 MG tablet TAKE 1 TABLET BY MOUTH EVERY DAY 90 tablet 3    magnesium 250 mg Tab Take 1 tablet (250 mg total) by mouth once daily. (Patient taking differently: Take 400 mg by mouth 2 (two) times daily. )  0    mirtazapine (REMERON) 7.5 MG Tab Take 1 tablet (7.5 mg total) by mouth every evening. 30 tablet 11    multivitamin (MULTIPLE VITAMINS DAILY ORAL) Take by mouth.      ondansetron (ZOFRAN) 4 MG tablet Take 1 tablet (4 mg total) by mouth every 8 (eight) hours as needed for Nausea. 25 tablet 2    OXcarbazepine (TRILEPTAL) 300 MG Tab Take 1 tablet (300 mg total) by mouth nightly. 90 tablet 3    PHENobarbital (LUMINAL) 64.8 MG tablet Take 1 tablet (64.8 mg total) by mouth every evening. 90 tablet 5     polyethylene glycol (GLYCOLAX) 17 gram PwPk Take 17 g by mouth 2 (two) times daily as needed (Constipation).  0    pravastatin (PRAVACHOL) 20 MG tablet Take 1 tablet (20 mg total) by mouth once daily. 90 tablet 3    vitamin D (VITAMIN D3) 1000 units Tab Take 1,000 Units by mouth once daily.       No current facility-administered medications on file prior to visit.        Review of patient's allergies indicates:   Allergen Reactions    Adhesive Itching and Blisters    Penicillins Anaphylaxis    Tramadol Hives    Avelox [moxifloxacin] Rash     Facial and arm itching and redness. Pt states throat closes when given.    Amoxil [amoxicillin]     Aspridrox [aspirin, buffered]     Codeine Other (See Comments)     Throat swelling    Keflex [cephalexin]      Tolerated cefepime and cefazolin    Norvasc [amlodipine]     Red dye Hives    Robitussin [guaifenesin]     Sulfa (sulfonamide antibiotics)     Tylenol [acetaminophen]      Has reaction to Tylenol with red dye and unable to take Extra Strength Tylenol/ CAN ONLY TOLERATE REG STRENGTH TYLENOL    Vicks vaporub [camphor-eucalyptus oil-menthol]        Review of Systems   Constitutional: Negative for activity change.   HENT: Negative for congestion.    Eyes: Negative for visual disturbance.   Respiratory: Negative for shortness of breath.    Cardiovascular: Negative for chest pain.   Gastrointestinal: Negative for abdominal distention.   Genitourinary: Negative for decreased urine volume, dysuria, flank pain and hematuria.   Musculoskeletal: Negative for arthralgias.   Skin: Negative for color change.   Neurological: Negative for dizziness.   Psychiatric/Behavioral: Negative for agitation.       Objective:      Physical Exam   Constitutional: She is oriented to person, place, and time. She appears well-developed.   HENT:   Head: Normocephalic and atraumatic.   Eyes: EOM are normal.   Neck: Normal range of motion.   Pulmonary/Chest: Effort normal.   Abdominal:  Soft. She exhibits no distension. There is no tenderness.   Genitourinary:   Genitourinary Comments: Martinez in place draining clear yellow urine   Musculoskeletal:   Patient in wheel chair   Neurological: She is alert and oriented to person, place, and time.   Skin: Skin is warm and dry.   Psychiatric: She has a normal mood and affect.       Assessment:       1. Urinary retention    2. Martinez catheter in place        Plan:         1. Discussed possible voiding trial with patient and her daughter. Since patient has not been able to work on mobility, we are going to hold off on voiding trial. If patient is cleared by cardiology tomorrow, pt's daughter is going to discuss with nursing home that patient needs to start exercising. Patient given leg bag to use when exercising. Catheter is not due to be changed, she would like to wait until next visit at the 4 week izabella. If patient is able to start exercising, we will perform voiding trial at next visit.       Urinary retention    Martinez catheter in place

## 2020-01-30 ENCOUNTER — HOSPITAL ENCOUNTER (INPATIENT)
Facility: HOSPITAL | Age: 85
LOS: 5 days | DRG: 271 | End: 2020-02-04
Attending: EMERGENCY MEDICINE | Admitting: FAMILY MEDICINE
Payer: MEDICARE

## 2020-01-30 ENCOUNTER — OFFICE VISIT (OUTPATIENT)
Dept: CARDIOLOGY | Facility: CLINIC | Age: 85
End: 2020-01-30
Payer: MEDICARE

## 2020-01-30 VITALS
HEART RATE: 98 BPM | DIASTOLIC BLOOD PRESSURE: 60 MMHG | SYSTOLIC BLOOD PRESSURE: 140 MMHG | BODY MASS INDEX: 27.29 KG/M2 | WEIGHT: 126.13 LBS

## 2020-01-30 DIAGNOSIS — T45.1X5A ANEMIA DUE TO ANTINEOPLASTIC CHEMOTHERAPY: Chronic | ICD-10-CM

## 2020-01-30 DIAGNOSIS — R60.0 LEG EDEMA, LEFT: ICD-10-CM

## 2020-01-30 DIAGNOSIS — I82.412 ACUTE DEEP VEIN THROMBOSIS (DVT) OF FEMORAL VEIN OF LEFT LOWER EXTREMITY: Primary | ICD-10-CM

## 2020-01-30 DIAGNOSIS — E03.9 ACQUIRED HYPOTHYROIDISM: Chronic | ICD-10-CM

## 2020-01-30 DIAGNOSIS — S22.080D CLOSED WEDGE COMPRESSION FRACTURE OF ELEVENTH THORACIC VERTEBRA WITH ROUTINE HEALING: ICD-10-CM

## 2020-01-30 DIAGNOSIS — M46.1 DEGENERATIVE JOINT DISEASE OF SACROILIAC JOINT: Chronic | ICD-10-CM

## 2020-01-30 DIAGNOSIS — M54.50 CHRONIC BILATERAL LOW BACK PAIN WITHOUT SCIATICA: ICD-10-CM

## 2020-01-30 DIAGNOSIS — G89.29 CHRONIC BILATERAL LOW BACK PAIN WITHOUT SCIATICA: ICD-10-CM

## 2020-01-30 DIAGNOSIS — I73.9 PERIPHERAL VASCULAR DISEASE, UNSPECIFIED: Chronic | ICD-10-CM

## 2020-01-30 DIAGNOSIS — M47.816 OSTEOARTHRITIS OF LUMBAR SPINE, UNSPECIFIED SPINAL OSTEOARTHRITIS COMPLICATION STATUS: Chronic | ICD-10-CM

## 2020-01-30 DIAGNOSIS — D64.9 ANEMIA OF ACUTE INFECTION: ICD-10-CM

## 2020-01-30 DIAGNOSIS — I45.5 SINUS PAUSE: ICD-10-CM

## 2020-01-30 DIAGNOSIS — D64.81 ANEMIA DUE TO ANTINEOPLASTIC CHEMOTHERAPY: Chronic | ICD-10-CM

## 2020-01-30 DIAGNOSIS — I70.1 BILATERAL RENAL ARTERY STENOSIS: ICD-10-CM

## 2020-01-30 DIAGNOSIS — I15.0 RENOVASCULAR HYPERTENSION: Chronic | ICD-10-CM

## 2020-01-30 DIAGNOSIS — C83.30 DIFFUSE LARGE B-CELL LYMPHOMA, UNSPECIFIED BODY REGION: Chronic | ICD-10-CM

## 2020-01-30 DIAGNOSIS — R33.9 URINARY RETENTION: Chronic | ICD-10-CM

## 2020-01-30 DIAGNOSIS — R60.0 BILATERAL LEG EDEMA: Primary | ICD-10-CM

## 2020-01-30 DIAGNOSIS — Z98.890 HISTORY OF STENT INSERTION OF RENAL ARTERY: Chronic | ICD-10-CM

## 2020-01-30 DIAGNOSIS — I25.2 OLD MI (MYOCARDIAL INFARCTION): Chronic | ICD-10-CM

## 2020-01-30 DIAGNOSIS — I48.0 PAROXYSMAL ATRIAL FIBRILLATION: Chronic | ICD-10-CM

## 2020-01-30 LAB
ALBUMIN SERPL BCP-MCNC: 3.4 G/DL (ref 3.5–5.2)
ALP SERPL-CCNC: 148 U/L (ref 55–135)
ALT SERPL W/O P-5'-P-CCNC: 32 U/L (ref 10–44)
ANION GAP SERPL CALC-SCNC: 7 MMOL/L (ref 8–16)
AST SERPL-CCNC: 30 U/L (ref 10–40)
BASOPHILS # BLD AUTO: 0.02 K/UL (ref 0–0.2)
BASOPHILS NFR BLD: 0.2 % (ref 0–1.9)
BILIRUB SERPL-MCNC: 0.2 MG/DL (ref 0.1–1)
BUN SERPL-MCNC: 31 MG/DL (ref 8–23)
CALCIUM SERPL-MCNC: 9.2 MG/DL (ref 8.7–10.5)
CHLORIDE SERPL-SCNC: 93 MMOL/L (ref 95–110)
CO2 SERPL-SCNC: 31 MMOL/L (ref 23–29)
CREAT SERPL-MCNC: 0.7 MG/DL (ref 0.5–1.4)
DIFFERENTIAL METHOD: ABNORMAL
EOSINOPHIL # BLD AUTO: 0.2 K/UL (ref 0–0.5)
EOSINOPHIL NFR BLD: 2.1 % (ref 0–8)
ERYTHROCYTE [DISTWIDTH] IN BLOOD BY AUTOMATED COUNT: 14.5 % (ref 11.5–14.5)
EST. GFR  (AFRICAN AMERICAN): >60 ML/MIN/1.73 M^2
EST. GFR  (NON AFRICAN AMERICAN): >60 ML/MIN/1.73 M^2
GLUCOSE SERPL-MCNC: 97 MG/DL (ref 70–110)
HCT VFR BLD AUTO: 29.6 % (ref 37–48.5)
HGB BLD-MCNC: 9.6 G/DL (ref 12–16)
INR PPP: 0.9 (ref 0.8–1.2)
LYMPHOCYTES # BLD AUTO: 3.3 K/UL (ref 1–4.8)
LYMPHOCYTES NFR BLD: 39.7 % (ref 18–48)
MCH RBC QN AUTO: 33.8 PG (ref 27–31)
MCHC RBC AUTO-ENTMCNC: 32.4 G/DL (ref 32–36)
MCV RBC AUTO: 104 FL (ref 82–98)
MONOCYTES # BLD AUTO: 0.7 K/UL (ref 0.3–1)
MONOCYTES NFR BLD: 8.6 % (ref 4–15)
NEUTROPHILS # BLD AUTO: 4.1 K/UL (ref 1.8–7.7)
NEUTROPHILS NFR BLD: 49.4 % (ref 38–73)
PLATELET # BLD AUTO: 206 K/UL (ref 150–350)
PMV BLD AUTO: 9.2 FL (ref 9.2–12.9)
POTASSIUM SERPL-SCNC: 4.3 MMOL/L (ref 3.5–5.1)
PROT SERPL-MCNC: 7.2 G/DL (ref 6–8.4)
PROTHROMBIN TIME: 10 SEC (ref 9–12.5)
RBC # BLD AUTO: 2.84 M/UL (ref 4–5.4)
SODIUM SERPL-SCNC: 131 MMOL/L (ref 136–145)
WBC # BLD AUTO: 8.23 K/UL (ref 3.9–12.7)

## 2020-01-30 PROCEDURE — 63600175 PHARM REV CODE 636 W HCPCS: Performed by: NURSE PRACTITIONER

## 2020-01-30 PROCEDURE — 99214 PR OFFICE/OUTPT VISIT, EST, LEVL IV, 30-39 MIN: ICD-10-PCS | Mod: S$PBB,,, | Performed by: INTERNAL MEDICINE

## 2020-01-30 PROCEDURE — 99213 OFFICE O/P EST LOW 20 MIN: CPT | Mod: PBBFAC,25,27,PO | Performed by: INTERNAL MEDICINE

## 2020-01-30 PROCEDURE — 99999 PR PBB SHADOW E&M-EST. PATIENT-LVL III: ICD-10-PCS | Mod: PBBFAC,,, | Performed by: INTERNAL MEDICINE

## 2020-01-30 PROCEDURE — 85025 COMPLETE CBC W/AUTO DIFF WBC: CPT

## 2020-01-30 PROCEDURE — 99214 OFFICE O/P EST MOD 30 MIN: CPT | Mod: S$PBB,,, | Performed by: INTERNAL MEDICINE

## 2020-01-30 PROCEDURE — 85610 PROTHROMBIN TIME: CPT

## 2020-01-30 PROCEDURE — 99285 EMERGENCY DEPT VISIT HI MDM: CPT | Mod: 25

## 2020-01-30 PROCEDURE — 80053 COMPREHEN METABOLIC PANEL: CPT

## 2020-01-30 PROCEDURE — 99999 PR PBB SHADOW E&M-EST. PATIENT-LVL III: CPT | Mod: PBBFAC,,, | Performed by: INTERNAL MEDICINE

## 2020-01-30 PROCEDURE — 11000001 HC ACUTE MED/SURG PRIVATE ROOM

## 2020-01-30 RX ORDER — ENOXAPARIN SODIUM 100 MG/ML
1 INJECTION SUBCUTANEOUS
Status: DISCONTINUED | OUTPATIENT
Start: 2020-01-30 | End: 2020-01-31

## 2020-01-30 RX ORDER — CLONIDINE HYDROCHLORIDE 0.1 MG/1
0.2 TABLET ORAL 2 TIMES DAILY
Status: DISCONTINUED | OUTPATIENT
Start: 2020-01-31 | End: 2020-02-01

## 2020-01-30 RX ORDER — HYDRALAZINE HYDROCHLORIDE 20 MG/ML
10 INJECTION INTRAMUSCULAR; INTRAVENOUS EVERY 8 HOURS PRN
Status: DISCONTINUED | OUTPATIENT
Start: 2020-01-30 | End: 2020-02-04 | Stop reason: HOSPADM

## 2020-01-30 RX ORDER — PHENOBARBITAL 32.4 MG/1
64.8 TABLET ORAL NIGHTLY
Status: DISCONTINUED | OUTPATIENT
Start: 2020-01-31 | End: 2020-02-04 | Stop reason: HOSPADM

## 2020-01-30 RX ORDER — PRAVASTATIN SODIUM 10 MG/1
20 TABLET ORAL DAILY
Status: DISCONTINUED | OUTPATIENT
Start: 2020-01-31 | End: 2020-02-04 | Stop reason: HOSPADM

## 2020-01-30 RX ORDER — ACETAMINOPHEN 325 MG/1
650 TABLET ORAL EVERY 6 HOURS PRN
Status: DISCONTINUED | OUTPATIENT
Start: 2020-01-30 | End: 2020-02-04 | Stop reason: HOSPADM

## 2020-01-30 RX ORDER — TALC
6 POWDER (GRAM) TOPICAL NIGHTLY PRN
Status: DISCONTINUED | OUTPATIENT
Start: 2020-01-30 | End: 2020-02-04 | Stop reason: HOSPADM

## 2020-01-30 RX ORDER — LISINOPRIL 20 MG/1
40 TABLET ORAL DAILY
Status: DISCONTINUED | OUTPATIENT
Start: 2020-01-31 | End: 2020-02-01

## 2020-01-30 RX ORDER — TALC
6 POWDER (GRAM) TOPICAL NIGHTLY PRN
Status: DISCONTINUED | OUTPATIENT
Start: 2020-01-30 | End: 2020-01-30

## 2020-01-30 RX ORDER — SODIUM CHLORIDE 0.9 % (FLUSH) 0.9 %
10 SYRINGE (ML) INJECTION
Status: DISCONTINUED | OUTPATIENT
Start: 2020-01-30 | End: 2020-02-04 | Stop reason: HOSPADM

## 2020-01-30 RX ORDER — ONDANSETRON 2 MG/ML
4 INJECTION INTRAMUSCULAR; INTRAVENOUS EVERY 6 HOURS PRN
Status: DISCONTINUED | OUTPATIENT
Start: 2020-01-30 | End: 2020-02-04 | Stop reason: HOSPADM

## 2020-01-30 RX ORDER — POLYETHYLENE GLYCOL 3350 17 G/17G
17 POWDER, FOR SOLUTION ORAL 2 TIMES DAILY PRN
Status: DISCONTINUED | OUTPATIENT
Start: 2020-01-30 | End: 2020-02-04 | Stop reason: HOSPADM

## 2020-01-30 RX ORDER — OXCARBAZEPINE 150 MG/1
300 TABLET, FILM COATED ORAL NIGHTLY
Status: DISCONTINUED | OUTPATIENT
Start: 2020-01-30 | End: 2020-02-04 | Stop reason: HOSPADM

## 2020-01-30 RX ADMIN — ENOXAPARIN SODIUM 60 MG: 60 INJECTION, SOLUTION INTRAVENOUS; SUBCUTANEOUS at 09:01

## 2020-01-30 NOTE — Clinical Note
Attempted to update family, family not in waiting area, called son Ernesto, no answer. Notification sent via text message thru epic.

## 2020-01-30 NOTE — Clinical Note
170 ml injected throughout the case. 30 mL total wasted during the case. 200 mL total used in the case.

## 2020-01-30 NOTE — Clinical Note
The catheter is inserted in to the  proximal IVC. POP VEIN ILIAC FEMORAL VEIN Intravascular ultrasound performed.

## 2020-01-30 NOTE — ED NOTES
Pt c/o L leg swelling noticed by her cardiologist yesterday. Pt is a resident of a [facility] home and denies trauma. Daughter states she is bed bound. Pt is A & O x 3, denies SOB and chest pain. Respirations are even and unlabored. Skin is warm, dry and pink. VS. MACARIO x 3mm. BBS- CTA. Abd- SNT. PSM x 4 exts. Pt is connected to the pulse ox, B/P cuff and EKG monitor. Bed is locked and in the low position w/ the side rails up and locked for pt safety. Call bell @ the BS. Will continue to monitor closely.

## 2020-01-30 NOTE — Clinical Note
Attempted to update family, called both waiting areas and no asnwer. Will attempt to update again in 1 hr.

## 2020-01-30 NOTE — Clinical Note
The site was marked. Prepped: left foot. Prepped with: ChloraPrep. The site was clipped. The patient was draped. Entire L leg

## 2020-01-30 NOTE — Clinical Note
A venogram was performed on the left upper leg. The venogram syringe was removed and the venogram is complete.

## 2020-01-30 NOTE — ED PROVIDER NOTES
Encounter Date: 1/30/2020    SCRIBE #1 NOTE: I, Yumiko Vera, am scribing for, and in the presence of,  Dr. Curran. I have scribed the entire note.       History     Chief Complaint   Patient presents with    Leg Swelling     sent to the ER by her cardiologist to rule out DVT in her left lower leg. First noticed swelling to her left leg today. Denies pain or other complaint     Annette Garcia is a 84 y.o. Female with history of HTN who presents to the ED complaining of right leg swelling. Was seen this morning by her Cardiologist. Pt had Holter monitor test from x2 weeks ago and was receiving the results today. During her exam the doctor noticed leg swelling. Per pt's daughter pt is bed bound and sits in a wheelchair. Denies having any leg pain, CP, lightheadedness, or SOB. Not taking any blood thinners currently. Pt lives in a nursing home.    The history is provided by the patient and a relative.     Review of patient's allergies indicates:   Allergen Reactions    Adhesive Itching and Blisters    Penicillins Anaphylaxis    Tramadol Hives    Avelox [moxifloxacin] Rash     Facial and arm itching and redness. Pt states throat closes when given.    Amoxil [amoxicillin]     Aspridrox [aspirin, buffered]     Codeine Other (See Comments)     Throat swelling    Keflex [cephalexin]      Tolerated cefepime and cefazolin    Norvasc [amlodipine]     Red dye Hives    Robitussin [guaifenesin]     Sulfa (sulfonamide antibiotics)     Tylenol [acetaminophen]      Has reaction to Tylenol with red dye and unable to take Extra Strength Tylenol/ CAN ONLY TOLERATE REG STRENGTH TYLENOL    Vicks vaporub [camphor-eucalyptus oil-menthol]      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    In the setting of septic shock and near death    Petit mal epilepsy 1954    Scoliosis of lumbar spine      Seizures     Unspecified hypothyroidism      Past Surgical History:   Procedure Laterality Date    APPENDECTOMY      BACK SURGERY  1988    vertebral fracture    BACK SURGERY  02/2013    lumbar L2-5    CATARACT EXTRACTION, BILATERAL Bilateral     CHOLECYSTECTOMY      open    EYE SURGERY Bilateral     cataract removal with lens implant    HYSTERECTOMY      PORTACATH PLACEMENT Right 09/2016    RENAL ARTERY STENT Left 07/19/2017    SIGMOIDECTOMY  10/29/2019    SMALL INTESTINE SURGERY  08/23/2016    TONSILLECTOMY      VAGINAL HYSTERECTOMY W/ ANTERIOR AND POSTERIOR VAGINAL REPAIR       Family History   Problem Relation Age of Onset    Pancreatic cancer Mother     Hypertension Father     Heart attack Father      Social History     Tobacco Use    Smoking status: Never Smoker    Smokeless tobacco: Never Used   Substance Use Topics    Alcohol use: No    Drug use: No     Review of Systems   Constitutional: Negative for fever.   HENT: Negative for sore throat.    Respiratory: Negative for shortness of breath.    Cardiovascular: Positive for leg swelling. Negative for chest pain.   Gastrointestinal: Negative for nausea.   Genitourinary: Negative for dysuria.   Musculoskeletal: Negative for back pain.   Skin: Negative for rash.   Neurological: Negative for weakness.   Hematological: Does not bruise/bleed easily.   All other systems reviewed and are negative.      Physical Exam     Initial Vitals [01/30/20 1638]   BP Pulse Resp Temp SpO2   (!) 145/61 90 18 98.5 °F (36.9 °C) 95 %      MAP       --         Physical Exam    Nursing note and vitals reviewed.  Constitutional: Vital signs are normal. She appears well-developed and well-nourished.   HENT:   Head: Normocephalic.   Right Ear: Hearing, tympanic membrane, external ear and ear canal normal.   Left Ear: Hearing, tympanic membrane, external ear and ear canal normal.   Nose: Nose normal.   Mouth/Throat: Uvula is midline, oropharynx is clear and moist and  mucous membranes are normal. Mucous membranes are not dry.   Eyes: Conjunctivae and EOM are normal. Pupils are equal, round, and reactive to light.   Neck: Trachea normal, normal range of motion and phonation normal. Neck supple.   Cardiovascular: Normal rate, regular rhythm, intact distal pulses and normal pulses. Exam reveals no gallop.    No murmur heard.  Pulses:       Dorsalis pedis pulses are 2+ on the left side.   Abdominal: Soft. Normal appearance and bowel sounds are normal. She exhibits no distension. There is no tenderness. There is no rebound, no guarding and no CVA tenderness.   Musculoskeletal: Normal range of motion.        Left lower leg: She exhibits edema (3+ pitting ).   Neurological: She is alert and oriented to person, place, and time. She has normal strength. No cranial nerve deficit or sensory deficit. GCS eye subscore is 4. GCS verbal subscore is 5. GCS motor subscore is 6.   Skin: Skin is warm and intact. Capillary refill takes less than 2 seconds. No rash noted.   Psychiatric: She has a normal mood and affect. Her speech is normal and behavior is normal. Thought content normal.         ED Course   Procedures  Labs Reviewed   CBC W/ AUTO DIFFERENTIAL - Abnormal; Notable for the following components:       Result Value    RBC 2.84 (*)     Hemoglobin 9.6 (*)     Hematocrit 29.6 (*)     Mean Corpuscular Volume 104 (*)     Mean Corpuscular Hemoglobin 33.8 (*)     All other components within normal limits   COMPREHENSIVE METABOLIC PANEL - Abnormal; Notable for the following components:    Sodium 131 (*)     Chloride 93 (*)     CO2 31 (*)     BUN, Bld 31 (*)     Albumin 3.4 (*)     Alkaline Phosphatase 148 (*)     Anion Gap 7 (*)     All other components within normal limits   PROTIME-INR          Imaging Results           US Lower Extremity Veins Bilateral (Final result)  Result time 01/30/20 19:35:52    Final result by Carolina Harvey MD (01/30/20 19:35:52)                 Impression:       Deep venous thrombosis within the left common femoral, femoral, greater saphenous, and popliteal veins.    No evidence of right lower extremity deep venous thrombosis.    This report was flagged in Epic as abnormal.      Electronically signed by: Carolina Harvey MD  Date:    01/30/2020  Time:    19:35             Narrative:    EXAMINATION:  US LOWER EXTREMITY VEINS BILATERAL    CLINICAL HISTORY:  Localized edema    TECHNIQUE:  Duplex and color flow Doppler evaluation of the bilateral lower extremity veins was performed.    COMPARISON:  None    FINDINGS:  Right lower extremity:    No evidence of clot involving the bilateral common femoral, greater saphenous, femoral, popliteal, peroneal, anterior and posterior tibial veins.  All venous structures demonstrate normal respiratory phasicity and augment adequately.  No evidence of soft tissue mass or Baker's cyst.    Left lower extremity:    Thrombosis is visualized within the left common femoral, femoral, greater saphenous, and popliteal veins.  Left posterior tibial, anterior tibial, and peroneal veins remain patent.                                 Medical Decision Making:   History:   Old Medical Records: I decided to obtain old medical records.  Initial Assessment:   Annette Gacria is a 84 y.o. female who presents to the ED complaining of leg swelling. PMHx of HTN. Noted BP of 145/61. Noted 3+ pitting edema and DP 2+ pulses on her left leg.    Differential Diagnosis:   DX includes DVT, dependent edema, fluid overload, kidney disease, liver disease  Clinical Tests:   Lab Tests: Ordered and Reviewed  Radiological Study: Ordered and Reviewed  ED Management:  Plan:  Obtain ultrasound, CBC, CMP and reassess.                   ED Course as of Feb 01 1531   Thu Jan 30, 2020   1950 NotedPositive ultrasound for DVT will admit patient for treatment.    [DC]   2028 Patient signed out to [facility]    [DC]      ED Course User Index  [DC] Jerome Curran Jr., MD                I, Jerome Curran,  personally performed the services described in this documentation. All medical record entries made by the scribe were at my direction and in my presence.  I have reviewed the chart and agree that the record reflects my personal performance and is accurate and complete. Jerome Curran M.D       Clinical Impression:     1. Acute deep vein thrombosis (DVT) of femoral vein of left lower extremity    2. Leg edema, left                                Jerome Curran Jr., MD  02/01/20 0815

## 2020-01-30 NOTE — Clinical Note
The catheter is reinserted in to the  proximal IVC. Intravascular ultrasound performed al the way down to left POP vein.

## 2020-01-31 ENCOUNTER — TELEPHONE (OUTPATIENT)
Dept: CARDIOLOGY | Facility: CLINIC | Age: 85
End: 2020-01-31

## 2020-01-31 PROBLEM — R33.9 URINARY RETENTION: Status: ACTIVE | Noted: 2019-11-04

## 2020-01-31 LAB
ALBUMIN SERPL BCP-MCNC: 3.1 G/DL (ref 3.5–5.2)
ALP SERPL-CCNC: 119 U/L (ref 55–135)
ALT SERPL W/O P-5'-P-CCNC: 27 U/L (ref 10–44)
ANION GAP SERPL CALC-SCNC: 8 MMOL/L (ref 8–16)
APTT BLDCRRT: 37.2 SEC (ref 21–32)
APTT BLDCRRT: 40.2 SEC (ref 21–32)
AST SERPL-CCNC: 23 U/L (ref 10–40)
BASOPHILS # BLD AUTO: 0.02 K/UL (ref 0–0.2)
BASOPHILS # BLD AUTO: 0.03 K/UL (ref 0–0.2)
BASOPHILS NFR BLD: 0.3 % (ref 0–1.9)
BASOPHILS NFR BLD: 0.5 % (ref 0–1.9)
BILIRUB SERPL-MCNC: 0.2 MG/DL (ref 0.1–1)
BUN SERPL-MCNC: 27 MG/DL (ref 8–23)
CALCIUM SERPL-MCNC: 8.8 MG/DL (ref 8.7–10.5)
CHLORIDE SERPL-SCNC: 96 MMOL/L (ref 95–110)
CO2 SERPL-SCNC: 28 MMOL/L (ref 23–29)
CREAT SERPL-MCNC: 0.7 MG/DL (ref 0.5–1.4)
DIFFERENTIAL METHOD: ABNORMAL
DIFFERENTIAL METHOD: ABNORMAL
EOSINOPHIL # BLD AUTO: 0.2 K/UL (ref 0–0.5)
EOSINOPHIL # BLD AUTO: 0.2 K/UL (ref 0–0.5)
EOSINOPHIL NFR BLD: 2.9 % (ref 0–8)
EOSINOPHIL NFR BLD: 3.5 % (ref 0–8)
ERYTHROCYTE [DISTWIDTH] IN BLOOD BY AUTOMATED COUNT: 14.5 % (ref 11.5–14.5)
ERYTHROCYTE [DISTWIDTH] IN BLOOD BY AUTOMATED COUNT: 14.6 % (ref 11.5–14.5)
EST. GFR  (AFRICAN AMERICAN): >60 ML/MIN/1.73 M^2
EST. GFR  (NON AFRICAN AMERICAN): >60 ML/MIN/1.73 M^2
GLUCOSE SERPL-MCNC: 101 MG/DL (ref 70–110)
HCT VFR BLD AUTO: 27.5 % (ref 37–48.5)
HCT VFR BLD AUTO: 29.7 % (ref 37–48.5)
HGB BLD-MCNC: 9 G/DL (ref 12–16)
HGB BLD-MCNC: 9.4 G/DL (ref 12–16)
INR PPP: 1 (ref 0.8–1.2)
INR PPP: 1 (ref 0.8–1.2)
LYMPHOCYTES # BLD AUTO: 2.1 K/UL (ref 1–4.8)
LYMPHOCYTES # BLD AUTO: 2.6 K/UL (ref 1–4.8)
LYMPHOCYTES NFR BLD: 34.4 % (ref 18–48)
LYMPHOCYTES NFR BLD: 43.3 % (ref 18–48)
MAGNESIUM SERPL-MCNC: 2.1 MG/DL (ref 1.6–2.6)
MCH RBC QN AUTO: 32.9 PG (ref 27–31)
MCH RBC QN AUTO: 33.8 PG (ref 27–31)
MCHC RBC AUTO-ENTMCNC: 31.6 G/DL (ref 32–36)
MCHC RBC AUTO-ENTMCNC: 32.7 G/DL (ref 32–36)
MCV RBC AUTO: 103 FL (ref 82–98)
MCV RBC AUTO: 104 FL (ref 82–98)
MONOCYTES # BLD AUTO: 0.6 K/UL (ref 0.3–1)
MONOCYTES # BLD AUTO: 0.7 K/UL (ref 0.3–1)
MONOCYTES NFR BLD: 11.4 % (ref 4–15)
MONOCYTES NFR BLD: 9.5 % (ref 4–15)
NEUTROPHILS # BLD AUTO: 2.6 K/UL (ref 1.8–7.7)
NEUTROPHILS # BLD AUTO: 3.1 K/UL (ref 1.8–7.7)
NEUTROPHILS NFR BLD: 43.2 % (ref 38–73)
NEUTROPHILS NFR BLD: 51 % (ref 38–73)
PHOSPHATE SERPL-MCNC: 2.9 MG/DL (ref 2.7–4.5)
PLATELET # BLD AUTO: 214 K/UL (ref 150–350)
PLATELET # BLD AUTO: 219 K/UL (ref 150–350)
PMV BLD AUTO: 8.8 FL (ref 9.2–12.9)
PMV BLD AUTO: 9.3 FL (ref 9.2–12.9)
POTASSIUM SERPL-SCNC: 4.1 MMOL/L (ref 3.5–5.1)
PROT SERPL-MCNC: 6.6 G/DL (ref 6–8.4)
PROTHROMBIN TIME: 10.6 SEC (ref 9–12.5)
PROTHROMBIN TIME: 10.7 SEC (ref 9–12.5)
RBC # BLD AUTO: 2.66 M/UL (ref 4–5.4)
RBC # BLD AUTO: 2.86 M/UL (ref 4–5.4)
SODIUM SERPL-SCNC: 132 MMOL/L (ref 136–145)
WBC # BLD AUTO: 6.01 K/UL (ref 3.9–12.7)
WBC # BLD AUTO: 6.14 K/UL (ref 3.9–12.7)

## 2020-01-31 PROCEDURE — 84100 ASSAY OF PHOSPHORUS: CPT

## 2020-01-31 PROCEDURE — 94761 N-INVAS EAR/PLS OXIMETRY MLT: CPT

## 2020-01-31 PROCEDURE — 85610 PROTHROMBIN TIME: CPT

## 2020-01-31 PROCEDURE — 85730 THROMBOPLASTIN TIME PARTIAL: CPT

## 2020-01-31 PROCEDURE — 63600175 PHARM REV CODE 636 W HCPCS: Performed by: NURSE PRACTITIONER

## 2020-01-31 PROCEDURE — 99223 PR INITIAL HOSPITAL CARE,LEVL III: ICD-10-PCS | Mod: ,,, | Performed by: STUDENT IN AN ORGANIZED HEALTH CARE EDUCATION/TRAINING PROGRAM

## 2020-01-31 PROCEDURE — 85730 THROMBOPLASTIN TIME PARTIAL: CPT | Mod: 91

## 2020-01-31 PROCEDURE — 85025 COMPLETE CBC W/AUTO DIFF WBC: CPT | Mod: 91

## 2020-01-31 PROCEDURE — 25000003 PHARM REV CODE 250: Performed by: NURSE PRACTITIONER

## 2020-01-31 PROCEDURE — 11000001 HC ACUTE MED/SURG PRIVATE ROOM

## 2020-01-31 PROCEDURE — 83735 ASSAY OF MAGNESIUM: CPT

## 2020-01-31 PROCEDURE — 99223 1ST HOSP IP/OBS HIGH 75: CPT | Mod: ,,, | Performed by: STUDENT IN AN ORGANIZED HEALTH CARE EDUCATION/TRAINING PROGRAM

## 2020-01-31 PROCEDURE — 36415 COLL VENOUS BLD VENIPUNCTURE: CPT

## 2020-01-31 PROCEDURE — 85610 PROTHROMBIN TIME: CPT | Mod: 91

## 2020-01-31 PROCEDURE — 63600175 PHARM REV CODE 636 W HCPCS: Performed by: FAMILY MEDICINE

## 2020-01-31 PROCEDURE — 80053 COMPREHEN METABOLIC PANEL: CPT

## 2020-01-31 RX ORDER — HEPARIN SODIUM,PORCINE/D5W 25000/250
18 INTRAVENOUS SOLUTION INTRAVENOUS CONTINUOUS
Status: DISCONTINUED | OUTPATIENT
Start: 2020-01-31 | End: 2020-02-03

## 2020-01-31 RX ORDER — ENOXAPARIN SODIUM 100 MG/ML
80 INJECTION SUBCUTANEOUS
Status: DISCONTINUED | OUTPATIENT
Start: 2020-01-31 | End: 2020-01-31

## 2020-01-31 RX ADMIN — LEVOTHYROXINE SODIUM 125 MCG: 75 TABLET ORAL at 05:01

## 2020-01-31 RX ADMIN — ENOXAPARIN SODIUM 60 MG: 60 INJECTION, SOLUTION INTRAVENOUS; SUBCUTANEOUS at 09:01

## 2020-01-31 RX ADMIN — CLONIDINE HYDROCHLORIDE 0.2 MG: 0.1 TABLET ORAL at 09:01

## 2020-01-31 RX ADMIN — PHENOBARBITAL 64.8 MG: 32.4 TABLET ORAL at 10:01

## 2020-01-31 RX ADMIN — OXCARBAZEPINE 300 MG: 150 TABLET, FILM COATED ORAL at 09:01

## 2020-01-31 RX ADMIN — HEPARIN SODIUM 18 UNITS/KG/HR: 10000 INJECTION, SOLUTION INTRAVENOUS at 07:01

## 2020-01-31 RX ADMIN — PRAVASTATIN SODIUM 20 MG: 10 TABLET ORAL at 09:01

## 2020-01-31 RX ADMIN — OXCARBAZEPINE 300 MG: 150 TABLET, FILM COATED ORAL at 12:01

## 2020-01-31 NOTE — H&P
[facility] Medicine  History & Physical    Patient Name: Annette Garcia  MRN: [id number]  Admission Date: 1/30/2020  Attending Physician: Alice Barker*   Primary Care Provider: Jose Valles MD         Patient information was obtained from patient, caregiver / friend and ER records.     Subjective:     Principal Problem:Acute deep vein thrombosis (DVT) of femoral vein of left lower extremity    Chief Complaint:   Chief Complaint   Patient presents with    Leg Swelling     sent to the ER by her cardiologist to rule out DVT in her left lower leg. First noticed swelling to her left leg today. Denies pain or other complaint        HPI: 84 y.o. female with history of HTN, hypothyroidism, epilepsy, atrial fibrillation, allergy, colon cancer and scoliosis of lumbar spine who presents to the ED complaining of left leg swelling. She was seen this morning by her cardiologist, Dr. Medina, to receive results of Holter monitor test.  During her exam, cardiologist noticed leg swelling and referred patient to ED for further evaluation.  She denies leg pain, chest pain, lightheadedness, shortness of breath.  Denies taking blood thinners currently.  ED findings: Ultrasound showed thrombosis within the left common femoral, femoral, greater saphenous, and popliteal veins, H/H 9.6/29.6, Na 131.  Patient admitted to [facility] for further medical management and possible cardiology intervention.    Of note, patient currently resides at [facility].  She is wheelchair bound. Denies having any leg pain, CP, lightheadedness, or SOB. Not taking any blood thinners currently. Pt lives in a nursing home.    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    In the setting of septic shock and near death    Petit mal epilepsy 1954    Scoliosis of lumbar spine     Seizures      Unspecified hypothyroidism        Past Surgical History:   Procedure Laterality Date    APPENDECTOMY      BACK SURGERY  1988    vertebral fracture    BACK SURGERY  02/2013    lumbar L2-5    CATARACT EXTRACTION, BILATERAL Bilateral     CHOLECYSTECTOMY      open    EYE SURGERY Bilateral     cataract removal with lens implant    HYSTERECTOMY      PORTACATH PLACEMENT Right 09/2016    RENAL ARTERY STENT Left 07/19/2017    SIGMOIDECTOMY  10/29/2019    SMALL INTESTINE SURGERY  08/23/2016    TONSILLECTOMY      VAGINAL HYSTERECTOMY W/ ANTERIOR AND POSTERIOR VAGINAL REPAIR         Review of patient's allergies indicates:   Allergen Reactions    Adhesive Itching and Blisters    Penicillins Anaphylaxis    Tramadol Hives    Avelox [moxifloxacin] Rash     Facial and arm itching and redness. Pt states throat closes when given.    Amoxil [amoxicillin]     Aspridrox [aspirin, buffered]     Codeine Other (See Comments)     Throat swelling    Keflex [cephalexin]      Tolerated cefepime and cefazolin    Norvasc [amlodipine]     Red dye Hives    Robitussin [guaifenesin]     Sulfa (sulfonamide antibiotics)     Tylenol [acetaminophen]      Has reaction to Tylenol with red dye and unable to take Extra Strength Tylenol/ CAN ONLY TOLERATE REG STRENGTH TYLENOL    Vicks vaporub [camphor-eucalyptus oil-menthol]        No current facility-administered medications on file prior to encounter.      Current Outpatient Medications on File Prior to Encounter   Medication Sig    acetaminophen (TYLENOL) 325 MG tablet Take 2 tablets (650 mg total) by mouth every 4 (four) hours as needed for Pain.    ascorbic acid, vitamin C, (VITAMIN C) 500 MG tablet Take 500 mg by mouth once daily.    calcium carbonate (OS-RICHARDSON) 600 mg calcium (1,500 mg) Tab Take 0.5 tablets (300 mg total) by mouth once daily.    cloNIDine (CATAPRES) 0.2 MG tablet TAKE 1 TABLET (0.2 MG TOTAL) BY MOUTH 2 (TWO) TIMES DAILY.    levothyroxine (SYNTHROID) 125  MCG tablet TAKE 1 TABLET (125 MCG TOTAL) BY MOUTH ONCE DAILY.    lisinopril (PRINIVIL,ZESTRIL) 40 MG tablet TAKE 1 TABLET BY MOUTH EVERY DAY    magnesium 250 mg Tab Take 1 tablet (250 mg total) by mouth once daily. (Patient taking differently: Take 400 mg by mouth 2 (two) times daily. )    mirtazapine (REMERON) 7.5 MG Tab Take 1 tablet (7.5 mg total) by mouth every evening.    multivitamin (MULTIPLE VITAMINS DAILY ORAL) Take by mouth.    OXcarbazepine (TRILEPTAL) 300 MG Tab Take 1 tablet (300 mg total) by mouth nightly.    PHENobarbital (LUMINAL) 64.8 MG tablet Take 1 tablet (64.8 mg total) by mouth every evening.    pravastatin (PRAVACHOL) 20 MG tablet Take 1 tablet (20 mg total) by mouth once daily.    vitamin D (VITAMIN D3) 1000 units Tab Take 1,000 Units by mouth once daily.    ondansetron (ZOFRAN) 4 MG tablet Take 1 tablet (4 mg total) by mouth every 8 (eight) hours as needed for Nausea.    [DISCONTINUED] heparin sod,pork in 0.45% NaCl (HEPARIN, PORCINE, 100 UNITS) 100 unit/100 mL (1 unit/mL) SolP in sodium chloride 0.45 % 100 mL infusion Inject into the vein continuous.    [DISCONTINUED] polyethylene glycol (GLYCOLAX) 17 gram PwPk Take 17 g by mouth 2 (two) times daily as needed (Constipation).     Family History     Problem Relation (Age of Onset)    Heart attack Father    Hypertension Father    Pancreatic cancer Mother        Tobacco Use    Smoking status: Never Smoker    Smokeless tobacco: Never Used   Substance and Sexual Activity    Alcohol use: No    Drug use: No    Sexual activity: Not on file     Review of Systems   Constitutional: Negative for chills and fever.   HENT: Negative for congestion, postnasal drip and rhinorrhea.    Respiratory: Negative for cough, chest tightness and shortness of breath.    Cardiovascular: Positive for leg swelling (left lower extremity). Negative for chest pain.   Gastrointestinal: Negative for abdominal distention, abdominal pain, nausea and vomiting.    Genitourinary: Negative for difficulty urinating.   Musculoskeletal: Negative for arthralgias and myalgias.   Skin: Negative for color change and wound.   Neurological: Negative for weakness and headaches.   Hematological: Does not bruise/bleed easily.   Psychiatric/Behavioral: Negative for agitation.     Objective:     Vital Signs (Most Recent):  Temp: 98.5 °F (36.9 °C) (01/30/20 1638)  Pulse: 83 (01/30/20 2246)  Resp: 16 (01/30/20 1900)  BP: (!) 163/70 (01/30/20 2201)  SpO2: 97 % (01/30/20 2201) Vital Signs (24h Range):  Temp:  [98.5 °F (36.9 °C)] 98.5 °F (36.9 °C)  Pulse:  [82-98] 83  Resp:  [14-18] 16  SpO2:  [95 %-99 %] 97 %  BP: (140-185)/(60-72) 163/70     Weight: 57.2 kg (126 lb)  Body mass index is 27.27 kg/m².    Physical Exam   Constitutional: She is oriented to person, place, and time. She appears well-developed and well-nourished.   HENT:   Head: Normocephalic and atraumatic.   Eyes: Pupils are equal, round, and reactive to light. EOM are normal.   Neck: Normal range of motion. Neck supple.   Cardiovascular: Normal rate.   Pulmonary/Chest: Effort normal and breath sounds normal.   Abdominal: Soft. Bowel sounds are normal.   Musculoskeletal: She exhibits edema (left lower extremity). She exhibits no deformity.   Neurological: She is alert and oriented to person, place, and time.   Skin: Skin is warm and dry.   Psychiatric: She has a normal mood and affect. Her behavior is normal.         CRANIAL NERVES     CN III, IV, VI   Pupils are equal, round, and reactive to light.  Extraocular motions are normal.        Significant Labs:   BMP:   Recent Labs   Lab 01/30/20  1713   GLU 97   *   K 4.3   CL 93*   CO2 31*   BUN 31*   CREATININE 0.7   CALCIUM 9.2     CBC:   Recent Labs   Lab 01/30/20  1713   WBC 8.23   HGB 9.6*   HCT 29.6*        CMP:   Recent Labs   Lab 01/30/20  1713   *   K 4.3   CL 93*   CO2 31*   GLU 97   BUN 31*   CREATININE 0.7   CALCIUM 9.2   PROT 7.2   ALBUMIN 3.4*    BILITOT 0.2   ALKPHOS 148*   AST 30   ALT 32   ANIONGAP 7*   EGFRNONAA >60     Coagulation:   Recent Labs   Lab 01/30/20  1713   INR 0.9     TSH:   Recent Labs   Lab 11/01/19  1213   TSH 0.507       Significant Imaging: I have reviewed all pertinent imaging results/findings within the past 24 hours.    Assessment/Plan:     * Acute deep vein thrombosis (DVT) of femoral vein of left lower extremity  Therapeutic Lovenox q 12hr  Continuous cardiac monitoring  Consult cardiology: pending  Routine vital signs          Urinary retention  Patient had gonzales in place on admission 2/2 urinary retention, she follows with urology as outpatient.  Gonzales care  Continue to gonzales urology as outpatient      Slow transit constipation  Continue miralax      Hypertension  Continue clonidine and Lisinopril  Hydralazine prn      Acquired hypothyroidism  Continue synthroid      Epilepsy  Stable  Continue trileptal and phenobarbital   Seizure precautions        VTE Risk Mitigation (From admission, onward)         Ordered     enoxaparin injection 60 mg  Every 12 hours (non-standard times)      01/30/20 2036                   Abbey Mayo NP  Department of Hospital Medicine   [facility]

## 2020-01-31 NOTE — ASSESSMENT & PLAN NOTE
Thrombosis is visualized within the left common femoral, femoral, greater saphenous, and popliteal veins.  Provoked DVT in setting of immobility     Significant swelling noted to LLE  Lovenox   NPO for thrombectomy   Patient will need OAC post procedure

## 2020-01-31 NOTE — HOSPITAL COURSE
01/31/2020 Per HPI   02/03/2020 NPO for thrombectomy,  Images were obtained and extensive iliofemoral thrombus noted with compression noted in the CIV and confirmed with IVUS. Successful clot retrieval with Inari device and stent placement in CIV/EIV with excellent results.  Eliquis initiated.  02/04/2020 Hypotensive overnight, asymptomatic. Normotensive this AM. Tolerating Eliquis. PT/OT ordered.

## 2020-01-31 NOTE — NURSING
"Pt transfer in stable condition. Multiple family members (3) at [facility]. All including pt educated on DVT. Instructed to use no movement/ bending of left leg with DVTs. All voiced complete understanding. Family stated "she doesn't move much anyway." Pt is bed bound. No constrictions,no sock on left foot.  Right CW port accessed by ER nurse today. biopatch in place. Dressing c/d/intact. Sacral stage 2 noted, cleansed as ordered, mepilex placed x 2x. Pt denies c/o SOB or distress SPo2 97-98 % on RA. Suction set up with carrie at BS at present time 2nd seizure precautions. Family stated last seizure was months ago. Pt denies any complaints, no pain. Will CTM.pt and family alos informed of NPO status after MN. All voiced complete understanding, stated she will comply.  "

## 2020-01-31 NOTE — NURSING
MICHELL Potter informed pt has been admitted with prior gonzales in place and that family stated gonzales was placed in October.

## 2020-01-31 NOTE — SUBJECTIVE & OBJECTIVE
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    In the setting of septic shock and near death    Petit mal epilepsy 1954    Scoliosis of lumbar spine     Seizures     Unspecified hypothyroidism        Past Surgical History:   Procedure Laterality Date    APPENDECTOMY      BACK SURGERY  1988    vertebral fracture    BACK SURGERY  02/2013    lumbar L2-5    CATARACT EXTRACTION, BILATERAL Bilateral     CHOLECYSTECTOMY      open    EYE SURGERY Bilateral     cataract removal with lens implant    HYSTERECTOMY      PORTACATH PLACEMENT Right 09/2016    RENAL ARTERY STENT Left 07/19/2017    SIGMOIDECTOMY  10/29/2019    SMALL INTESTINE SURGERY  08/23/2016    TONSILLECTOMY      VAGINAL HYSTERECTOMY W/ ANTERIOR AND POSTERIOR VAGINAL REPAIR         Review of patient's allergies indicates:   Allergen Reactions    Adhesive Itching and Blisters    Penicillins Anaphylaxis    Tramadol Hives    Avelox [moxifloxacin] Rash     Facial and arm itching and redness. Pt states throat closes when given.    Amoxil [amoxicillin]     Aspridrox [aspirin, buffered]     Codeine Other (See Comments)     Throat swelling    Keflex [cephalexin]      Tolerated cefepime and cefazolin    Norvasc [amlodipine]     Red dye Hives    Robitussin [guaifenesin]     Sulfa (sulfonamide antibiotics)     Tylenol [acetaminophen]      Has reaction to Tylenol with red dye and unable to take Extra Strength Tylenol/ CAN ONLY TOLERATE REG STRENGTH TYLENOL    Vicks vaporub [camphor-eucalyptus oil-menthol]        No current facility-administered medications on file prior to encounter.      Current Outpatient Medications on File Prior to Encounter   Medication Sig    acetaminophen (TYLENOL) 325 MG tablet Take 2 tablets (650 mg total) by mouth every 4 (four) hours as needed for Pain.    ascorbic acid, vitamin C, (VITAMIN C)  500 MG tablet Take 500 mg by mouth once daily.    calcium carbonate (OS-RICHARDSON) 600 mg calcium (1,500 mg) Tab Take 0.5 tablets (300 mg total) by mouth once daily.    cloNIDine (CATAPRES) 0.2 MG tablet TAKE 1 TABLET (0.2 MG TOTAL) BY MOUTH 2 (TWO) TIMES DAILY.    levothyroxine (SYNTHROID) 125 MCG tablet TAKE 1 TABLET (125 MCG TOTAL) BY MOUTH ONCE DAILY.    lisinopril (PRINIVIL,ZESTRIL) 40 MG tablet TAKE 1 TABLET BY MOUTH EVERY DAY    magnesium 250 mg Tab Take 1 tablet (250 mg total) by mouth once daily. (Patient taking differently: Take 400 mg by mouth 2 (two) times daily. )    mirtazapine (REMERON) 7.5 MG Tab Take 1 tablet (7.5 mg total) by mouth every evening.    multivitamin (MULTIPLE VITAMINS DAILY ORAL) Take by mouth.    OXcarbazepine (TRILEPTAL) 300 MG Tab Take 1 tablet (300 mg total) by mouth nightly.    PHENobarbital (LUMINAL) 64.8 MG tablet Take 1 tablet (64.8 mg total) by mouth every evening.    pravastatin (PRAVACHOL) 20 MG tablet Take 1 tablet (20 mg total) by mouth once daily.    vitamin D (VITAMIN D3) 1000 units Tab Take 1,000 Units by mouth once daily.    ondansetron (ZOFRAN) 4 MG tablet Take 1 tablet (4 mg total) by mouth every 8 (eight) hours as needed for Nausea.     Family History     Problem Relation (Age of Onset)    Heart attack Father    Hypertension Father    Pancreatic cancer Mother        Tobacco Use    Smoking status: Never Smoker    Smokeless tobacco: Never Used   Substance and Sexual Activity    Alcohol use: No    Drug use: No    Sexual activity: Not on file     Review of Systems   Constitution: Negative for diaphoresis.   HENT: Negative.    Eyes: Negative.    Cardiovascular: Positive for leg swelling. Negative for chest pain, irregular heartbeat, near-syncope, orthopnea, palpitations, paroxysmal nocturnal dyspnea and syncope.   Respiratory: Negative for cough, shortness of breath, sputum production and wheezing.    Endocrine: Negative.    Hematologic/Lymphatic: Negative for  bleeding problem. Does not bruise/bleed easily.   Skin: Negative.    Musculoskeletal: Negative.    Gastrointestinal: Negative.    Genitourinary: Negative.    Neurological: Positive for weakness.   Psychiatric/Behavioral: Negative.    Allergic/Immunologic: Negative.      Objective:     Vital Signs (Most Recent):  Temp: 97.2 °F (36.2 °C) (01/31/20 0505)  Pulse: 75 (01/31/20 0816)  Resp: 16 (01/31/20 0816)  BP: (!) 101/51 (01/31/20 0505)  SpO2: 95 % (01/31/20 0816) Vital Signs (24h Range):  Temp:  [97.2 °F (36.2 °C)-98.5 °F (36.9 °C)] 97.2 °F (36.2 °C)  Pulse:  [75-98] 75  Resp:  [14-18] 16  SpO2:  [90 %-99 %] 95 %  BP: (101-185)/(51-72) 101/51     Weight: 84.4 kg (186 lb 1.1 oz)  Body mass index is 40.26 kg/m².    SpO2: 95 %  O2 Device (Oxygen Therapy): room air      Intake/Output Summary (Last 24 hours) at 1/31/2020 1109  Last data filed at 1/31/2020 0549  Gross per 24 hour   Intake 30 ml   Output 450 ml   Net -420 ml       Lines/Drains/Airways     Central Venous Catheter Line                 Port A Cath Single Lumen 01/30/20 right atrial 1 day          Drain                 Colostomy 10/29/19 1200 LLQ 94 days         Urethral Catheter 11/20/19 1405 Latex 16 Fr. 71 days                Physical Exam   Constitutional: She is oriented to person, place, and time. She appears well-developed and well-nourished. No distress.   HENT:   Head: Normocephalic and atraumatic.   Eyes: Right eye exhibits no discharge. Left eye exhibits no discharge.   Neck: No JVD present.   Cardiovascular: Normal rate, regular rhythm and normal heart sounds. Exam reveals no gallop and no friction rub.   No murmur heard.  Pulmonary/Chest: Effort normal and breath sounds normal.   Abdominal: Soft. Bowel sounds are normal.   Musculoskeletal: She exhibits no edema.   Neurological: She is alert and oriented to person, place, and time.   Skin: Skin is warm and dry. She is not diaphoretic.   Psychiatric: She has a normal mood and affect. Her behavior is  normal. Judgment and thought content normal.       Significant Labs:   BMP:   Recent Labs   Lab 01/30/20  1713 01/31/20  0419   GLU 97 101   * 132*   K 4.3 4.1   CL 93* 96   CO2 31* 28   BUN 31* 27*   CREATININE 0.7 0.7   CALCIUM 9.2 8.8   MG  --  2.1   , CMP   Recent Labs   Lab 01/30/20  1713 01/31/20  0419   * 132*   K 4.3 4.1   CL 93* 96   CO2 31* 28   GLU 97 101   BUN 31* 27*   CREATININE 0.7 0.7   CALCIUM 9.2 8.8   PROT 7.2 6.6   ALBUMIN 3.4* 3.1*   BILITOT 0.2 0.2   ALKPHOS 148* 119   AST 30 23   ALT 32 27   ANIONGAP 7* 8   ESTGFRAFRICA >60 >60   EGFRNONAA >60 >60   , CBC   Recent Labs   Lab 01/30/20  1713 01/31/20 0419   WBC 8.23 6.14   HGB 9.6* 9.0*   HCT 29.6* 27.5*    219   , INR   Recent Labs   Lab 01/30/20  1713 01/31/20 0419   INR 0.9 1.0   , Lipid Panel No results for input(s): CHOL, HDL, LDLCALC, TRIG, CHOLHDL in the last 48 hours., Troponin No results for input(s): TROPONINI in the last 48 hours. and All pertinent lab results from the last 24 hours have been reviewed.    Significant Imaging: Echocardiogram:   Transthoracic echo (TTE) complete (Cupid Only):   Results for orders placed or performed during the hospital encounter of 10/27/19   Echo Color Flow Doppler? Yes   Result Value Ref Range    BSA 1.73 m2    Ascending aorta 2.61 cm    AV mean gradient 3 mmHg    Ao peak gennaro 1.23 m/s    Ao VTI 17.79 cm    IVS 1.07 0.6 - 1.1 cm    LA size 3.21 cm    Left Atrium Major Axis 5.12 cm    Left Atrium Minor Axis 4.83 cm    LVIDD 4.17 3.5 - 6.0 cm    LVIDS 2.93 2.1 - 4.0 cm    LVOT diameter 1.96 cm    LVOT peak VTI 14.74 cm    PW 0.98 0.6 - 1.1 cm    PV Peak D Gennaro 0.24 m/s    PV Peak S Gennaro 0.29 m/s    RA Major Axis 3.96 cm    RA Width 2.15 cm    RVDD 1.57 cm    TR Max Gennaro 2.06 m/s    LA WIDTH 2.65 cm    Ao root annulus 3.38 cm    AORTIC VALVE CUSP SEPERATION 1.48 cm    LV Diastolic Volume 77.15 mL    LV Systolic Volume 33.07 mL    LVOT peak gennaro 0.80 m/s    FS 30 %    LA volume 35.94 cm3     LV mass 140.48 g    Left Ventricle Relative Wall Thickness 0.47 cm    AV valve area 2.50 cm2    AV Velocity Ratio 0.65     AV index (prosthetic) 0.83     Pulm vein S/D ratio 1.21     LVOT area 3.0 cm2    LVOT stroke volume 44.45 cm3    AV peak gradient 6 mmHg    LV Systolic Volume Index 19.7 mL/m2    LV Diastolic Volume Index 45.94 mL/m2    LA Volume Index 21.4 mL/m2    LV Mass Index 84 g/m2    Triscuspid Valve Regurgitation Peak Gradient 17 mmHg    TDI SEPTAL 0.09 m/s    TDI LATERAL 0.16 m/s    Mean e' 0.13 m/s    Narrative    · Normal left ventricular systolic function. The estimated ejection   fraction is 55%  · No wall motion abnormalities.  · Concentric left ventricular remodeling.  · Normal right ventricular systolic function.  · Mild mitral regurgitation.  · Diastolic pattern consistent with atrial fibrillation observed.

## 2020-01-31 NOTE — ASSESSMENT & PLAN NOTE
Therapeutic Lovenox q 12hr  Continuous cardiac monitoring  Consult cardiology: pending  Routine vital signs

## 2020-01-31 NOTE — TELEPHONE ENCOUNTER
----- Message from Jie Romero sent at 2020  9:21 AM CST -----  Contact: [phone]/self  CONSULTS:     Patient: KURT APONTE     : 1935    Clinic#: [[id number]]    Room number: 432    Referring MD: Tara Mayo NP    Diagnosis: Left leg DVT    Person calling: Carly

## 2020-01-31 NOTE — CONSULTS
[facility]  Cardiology  Consult Note    Patient Name: Annette Garcia  MRN: [id number]  Admission Date: 1/30/2020  Hospital Length of Stay: 1 days  Code Status: Full Code   Attending Provider: Alice Barker*   Consulting Provider: Brice Perales NP  Primary Care Physician: Jose Valles MD  Principal Problem:Acute deep vein thrombosis (DVT) of femoral vein of left lower extremity    Patient information was obtained from patient, past medical records and ER records.     Inpatient consult to [facility]  Consult performed by: Brice Perales NP  Consult ordered by: Abbey Mayo NP        Subjective:     Chief Complaint:  LLE swelling     HPI:   Annette Garcia is an 84 y.o. female with HTN, hypothyroidism, epilepsy, atrial fibrillation, allergy, colon cancer and scoliosis of lumbar spine who presents to the ED complaining of left leg swelling. She was sent by Dr. Medina due to marked LLE swelling.  She denies leg pain, chest pain, lightheadedness, shortness of breath. Patient is unsure how long her leg has been swollen. Ultrasound showed thrombosis within the left common femoral, femoral, greater saphenous, and popliteal veins. Patient has a remote history of colon ca with resection in 10/2019. She is wheelchair bound. No bleeding issues reported.   NPO for potential thrombectomy today     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    In the setting of septic shock and near death    Petit mal epilepsy 1954    Scoliosis of lumbar spine     Seizures     Unspecified hypothyroidism        Past Surgical History:   Procedure Laterality Date    APPENDECTOMY      BACK SURGERY  1988    vertebral fracture    BACK SURGERY  02/2013    lumbar L2-5    CATARACT EXTRACTION, BILATERAL Bilateral     CHOLECYSTECTOMY      open    EYE SURGERY Bilateral      cataract removal with lens implant    HYSTERECTOMY      PORTACATH PLACEMENT Right 09/2016    RENAL ARTERY STENT Left 07/19/2017    SIGMOIDECTOMY  10/29/2019    SMALL INTESTINE SURGERY  08/23/2016    TONSILLECTOMY      VAGINAL HYSTERECTOMY W/ ANTERIOR AND POSTERIOR VAGINAL REPAIR         Review of patient's allergies indicates:   Allergen Reactions    Adhesive Itching and Blisters    Penicillins Anaphylaxis    Tramadol Hives    Avelox [moxifloxacin] Rash     Facial and arm itching and redness. Pt states throat closes when given.    Amoxil [amoxicillin]     Aspridrox [aspirin, buffered]     Codeine Other (See Comments)     Throat swelling    Keflex [cephalexin]      Tolerated cefepime and cefazolin    Norvasc [amlodipine]     Red dye Hives    Robitussin [guaifenesin]     Sulfa (sulfonamide antibiotics)     Tylenol [acetaminophen]      Has reaction to Tylenol with red dye and unable to take Extra Strength Tylenol/ CAN ONLY TOLERATE REG STRENGTH TYLENOL    Vicks vaporub [camphor-eucalyptus oil-menthol]        No current facility-administered medications on file prior to encounter.      Current Outpatient Medications on File Prior to Encounter   Medication Sig    acetaminophen (TYLENOL) 325 MG tablet Take 2 tablets (650 mg total) by mouth every 4 (four) hours as needed for Pain.    ascorbic acid, vitamin C, (VITAMIN C) 500 MG tablet Take 500 mg by mouth once daily.    calcium carbonate (OS-RICHARDSON) 600 mg calcium (1,500 mg) Tab Take 0.5 tablets (300 mg total) by mouth once daily.    cloNIDine (CATAPRES) 0.2 MG tablet TAKE 1 TABLET (0.2 MG TOTAL) BY MOUTH 2 (TWO) TIMES DAILY.    levothyroxine (SYNTHROID) 125 MCG tablet TAKE 1 TABLET (125 MCG TOTAL) BY MOUTH ONCE DAILY.    lisinopril (PRINIVIL,ZESTRIL) 40 MG tablet TAKE 1 TABLET BY MOUTH EVERY DAY    magnesium 250 mg Tab Take 1 tablet (250 mg total) by mouth once daily. (Patient taking differently: Take 400 mg by mouth 2 (two) times daily. )     mirtazapine (REMERON) 7.5 MG Tab Take 1 tablet (7.5 mg total) by mouth every evening.    multivitamin (MULTIPLE VITAMINS DAILY ORAL) Take by mouth.    OXcarbazepine (TRILEPTAL) 300 MG Tab Take 1 tablet (300 mg total) by mouth nightly.    PHENobarbital (LUMINAL) 64.8 MG tablet Take 1 tablet (64.8 mg total) by mouth every evening.    pravastatin (PRAVACHOL) 20 MG tablet Take 1 tablet (20 mg total) by mouth once daily.    vitamin D (VITAMIN D3) 1000 units Tab Take 1,000 Units by mouth once daily.    ondansetron (ZOFRAN) 4 MG tablet Take 1 tablet (4 mg total) by mouth every 8 (eight) hours as needed for Nausea.     Family History     Problem Relation (Age of Onset)    Heart attack Father    Hypertension Father    Pancreatic cancer Mother        Tobacco Use    Smoking status: Never Smoker    Smokeless tobacco: Never Used   Substance and Sexual Activity    Alcohol use: No    Drug use: No    Sexual activity: Not on file     Review of Systems   Constitution: Negative for diaphoresis.   HENT: Negative.    Eyes: Negative.    Cardiovascular: Positive for leg swelling. Negative for chest pain, irregular heartbeat, near-syncope, orthopnea, palpitations, paroxysmal nocturnal dyspnea and syncope.   Respiratory: Negative for cough, shortness of breath, sputum production and wheezing.    Endocrine: Negative.    Hematologic/Lymphatic: Negative for bleeding problem. Does not bruise/bleed easily.   Skin: Negative.    Musculoskeletal: Negative.    Gastrointestinal: Negative.    Genitourinary: Negative.    Neurological: Positive for weakness.   Psychiatric/Behavioral: Negative.    Allergic/Immunologic: Negative.      Objective:     Vital Signs (Most Recent):  Temp: 97.2 °F (36.2 °C) (01/31/20 0505)  Pulse: 75 (01/31/20 0816)  Resp: 16 (01/31/20 0816)  BP: (!) 101/51 (01/31/20 0505)  SpO2: 95 % (01/31/20 0816) Vital Signs (24h Range):  Temp:  [97.2 °F (36.2 °C)-98.5 °F (36.9 °C)] 97.2 °F (36.2 °C)  Pulse:  [75-98] 75  Resp:   [14-18] 16  SpO2:  [90 %-99 %] 95 %  BP: (101-185)/(51-72) 101/51     Weight: 84.4 kg (186 lb 1.1 oz)  Body mass index is 40.26 kg/m².    SpO2: 95 %  O2 Device (Oxygen Therapy): room air      Intake/Output Summary (Last 24 hours) at 1/31/2020 1109  Last data filed at 1/31/2020 0549  Gross per 24 hour   Intake 30 ml   Output 450 ml   Net -420 ml       Lines/Drains/Airways     Central Venous Catheter Line                 Port A Cath Single Lumen 01/30/20 right atrial 1 day          Drain                 Colostomy 10/29/19 1200 LLQ 94 days         Urethral Catheter 11/20/19 1405 Latex 16 Fr. 71 days                Physical Exam   Constitutional: She is oriented to person, place, and time. She appears well-developed and well-nourished. No distress.   HENT:   Head: Normocephalic and atraumatic.   Eyes: Right eye exhibits no discharge. Left eye exhibits no discharge.   Neck: No JVD present.   Cardiovascular: Normal rate, regular rhythm and normal heart sounds. Exam reveals no gallop and no friction rub.   No murmur heard.  Pulmonary/Chest: Effort normal and breath sounds normal.   Abdominal: Soft. Bowel sounds are normal.   Musculoskeletal: She exhibits no edema.   Neurological: She is alert and oriented to person, place, and time.   Skin: Skin is warm and dry. She is not diaphoretic.   Psychiatric: She has a normal mood and affect. Her behavior is normal. Judgment and thought content normal.       Significant Labs:   BMP:   Recent Labs   Lab 01/30/20  1713 01/31/20  0419   GLU 97 101   * 132*   K 4.3 4.1   CL 93* 96   CO2 31* 28   BUN 31* 27*   CREATININE 0.7 0.7   CALCIUM 9.2 8.8   MG  --  2.1   , CMP   Recent Labs   Lab 01/30/20  1713 01/31/20  0419   * 132*   K 4.3 4.1   CL 93* 96   CO2 31* 28   GLU 97 101   BUN 31* 27*   CREATININE 0.7 0.7   CALCIUM 9.2 8.8   PROT 7.2 6.6   ALBUMIN 3.4* 3.1*   BILITOT 0.2 0.2   ALKPHOS 148* 119   AST 30 23   ALT 32 27   ANIONGAP 7* 8   ESTGFRAFRICA >60 >60   EGFRNONAA >60  >60   , CBC   Recent Labs   Lab 01/30/20  1713 01/31/20 0419   WBC 8.23 6.14   HGB 9.6* 9.0*   HCT 29.6* 27.5*    219   , INR   Recent Labs   Lab 01/30/20  1713 01/31/20 0419   INR 0.9 1.0   , Lipid Panel No results for input(s): CHOL, HDL, LDLCALC, TRIG, CHOLHDL in the last 48 hours., Troponin No results for input(s): TROPONINI in the last 48 hours. and All pertinent lab results from the last 24 hours have been reviewed.    Significant Imaging: Echocardiogram:   Transthoracic echo (TTE) complete (Cupid Only):   Results for orders placed or performed during the hospital encounter of 10/27/19   Echo Color Flow Doppler? Yes   Result Value Ref Range    BSA 1.73 m2    Ascending aorta 2.61 cm    AV mean gradient 3 mmHg    Ao peak gennaro 1.23 m/s    Ao VTI 17.79 cm    IVS 1.07 0.6 - 1.1 cm    LA size 3.21 cm    Left Atrium Major Axis 5.12 cm    Left Atrium Minor Axis 4.83 cm    LVIDD 4.17 3.5 - 6.0 cm    LVIDS 2.93 2.1 - 4.0 cm    LVOT diameter 1.96 cm    LVOT peak VTI 14.74 cm    PW 0.98 0.6 - 1.1 cm    PV Peak D Gennaro 0.24 m/s    PV Peak S Gennaro 0.29 m/s    RA Major Axis 3.96 cm    RA Width 2.15 cm    RVDD 1.57 cm    TR Max Gennaro 2.06 m/s    LA WIDTH 2.65 cm    Ao root annulus 3.38 cm    AORTIC VALVE CUSP SEPERATION 1.48 cm    LV Diastolic Volume 77.15 mL    LV Systolic Volume 33.07 mL    LVOT peak gennaro 0.80 m/s    FS 30 %    LA volume 35.94 cm3    LV mass 140.48 g    Left Ventricle Relative Wall Thickness 0.47 cm    AV valve area 2.50 cm2    AV Velocity Ratio 0.65     AV index (prosthetic) 0.83     Pulm vein S/D ratio 1.21     LVOT area 3.0 cm2    LVOT stroke volume 44.45 cm3    AV peak gradient 6 mmHg    LV Systolic Volume Index 19.7 mL/m2    LV Diastolic Volume Index 45.94 mL/m2    LA Volume Index 21.4 mL/m2    LV Mass Index 84 g/m2    Triscuspid Valve Regurgitation Peak Gradient 17 mmHg    TDI SEPTAL 0.09 m/s    TDI LATERAL 0.16 m/s    Mean e' 0.13 m/s    Narrative    · Normal left ventricular systolic function. The  estimated ejection   fraction is 55%  · No wall motion abnormalities.  · Concentric left ventricular remodeling.  · Normal right ventricular systolic function.  · Mild mitral regurgitation.  · Diastolic pattern consistent with atrial fibrillation observed.        Assessment and Plan:     * Acute deep vein thrombosis (DVT) of femoral vein of left lower extremity  Thrombosis is visualized within the left common femoral, femoral, greater saphenous, and popliteal veins.  Provoked DVT in setting of immobility     Significant swelling noted to LLE  Lovenox   NPO for thrombectomy   Patient will need OAC post procedure         VTE Risk Mitigation (From admission, onward)         Ordered     enoxaparin injection 80 mg  Every 12 hours (non-standard times)      01/31/20 1016                Thank you for your consult. I will follow-up with patient. Please contact us if you have any additional questions.    Brice Perales NP  Cardiology   [facility]

## 2020-01-31 NOTE — HPI
Annette Garcia is an 84 y.o. female with HTN, hypothyroidism, epilepsy, atrial fibrillation, allergy, colon cancer and scoliosis of lumbar spine who presents to the ED complaining of left leg swelling. She was sent by Dr. Medina due to marked LLE swelling.  She denies leg pain, chest pain, lightheadedness, shortness of breath. Patient is unsure how long her leg has been swollen. Ultrasound showed thrombosis within the left common femoral, femoral, greater saphenous, and popliteal veins. Patient has a remote history of colon ca with resection in 10/2019. She is wheelchair bound. No bleeding issues reported.   NPO for potential thrombectomy today

## 2020-01-31 NOTE — ED NOTES
Pt A & O x 3, denies pain or distress. No respiratory distress observed. Skin is warm, dry and pink. VSS. +2 pedal pulses noted.

## 2020-01-31 NOTE — ASSESSMENT & PLAN NOTE
Patient had gonzales in place on admission 2/2 urinary retention, she follows with urology as outpatient.  Gonzales care  Continue to gonzales urology as outpatient

## 2020-01-31 NOTE — ED NOTES
Pt is A & O x 3, denies SOB, pain and distress. No respiratory distress. Skin is warm, dry and pink. VSS. +2 pedal pulses noted. Will continue to monitor closely.

## 2020-01-31 NOTE — SUBJECTIVE & OBJECTIVE
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    In the setting of septic shock and near death    Petit mal epilepsy 1954    Scoliosis of lumbar spine     Seizures     Unspecified hypothyroidism        Past Surgical History:   Procedure Laterality Date    APPENDECTOMY      BACK SURGERY  1988    vertebral fracture    BACK SURGERY  02/2013    lumbar L2-5    CATARACT EXTRACTION, BILATERAL Bilateral     CHOLECYSTECTOMY      open    EYE SURGERY Bilateral     cataract removal with lens implant    HYSTERECTOMY      PORTACATH PLACEMENT Right 09/2016    RENAL ARTERY STENT Left 07/19/2017    SIGMOIDECTOMY  10/29/2019    SMALL INTESTINE SURGERY  08/23/2016    TONSILLECTOMY      VAGINAL HYSTERECTOMY W/ ANTERIOR AND POSTERIOR VAGINAL REPAIR         Review of patient's allergies indicates:   Allergen Reactions    Adhesive Itching and Blisters    Penicillins Anaphylaxis    Tramadol Hives    Avelox [moxifloxacin] Rash     Facial and arm itching and redness. Pt states throat closes when given.    Amoxil [amoxicillin]     Aspridrox [aspirin, buffered]     Codeine Other (See Comments)     Throat swelling    Keflex [cephalexin]      Tolerated cefepime and cefazolin    Norvasc [amlodipine]     Red dye Hives    Robitussin [guaifenesin]     Sulfa (sulfonamide antibiotics)     Tylenol [acetaminophen]      Has reaction to Tylenol with red dye and unable to take Extra Strength Tylenol/ CAN ONLY TOLERATE REG STRENGTH TYLENOL    Vicks vaporub [camphor-eucalyptus oil-menthol]        No current facility-administered medications on file prior to encounter.      Current Outpatient Medications on File Prior to Encounter   Medication Sig    acetaminophen (TYLENOL) 325 MG tablet Take 2 tablets (650 mg total) by mouth every 4 (four) hours as needed for Pain.    ascorbic acid, vitamin C, (VITAMIN C)  500 MG tablet Take 500 mg by mouth once daily.    calcium carbonate (OS-RICHARDSON) 600 mg calcium (1,500 mg) Tab Take 0.5 tablets (300 mg total) by mouth once daily.    cloNIDine (CATAPRES) 0.2 MG tablet TAKE 1 TABLET (0.2 MG TOTAL) BY MOUTH 2 (TWO) TIMES DAILY.    levothyroxine (SYNTHROID) 125 MCG tablet TAKE 1 TABLET (125 MCG TOTAL) BY MOUTH ONCE DAILY.    lisinopril (PRINIVIL,ZESTRIL) 40 MG tablet TAKE 1 TABLET BY MOUTH EVERY DAY    magnesium 250 mg Tab Take 1 tablet (250 mg total) by mouth once daily. (Patient taking differently: Take 400 mg by mouth 2 (two) times daily. )    mirtazapine (REMERON) 7.5 MG Tab Take 1 tablet (7.5 mg total) by mouth every evening.    multivitamin (MULTIPLE VITAMINS DAILY ORAL) Take by mouth.    OXcarbazepine (TRILEPTAL) 300 MG Tab Take 1 tablet (300 mg total) by mouth nightly.    PHENobarbital (LUMINAL) 64.8 MG tablet Take 1 tablet (64.8 mg total) by mouth every evening.    pravastatin (PRAVACHOL) 20 MG tablet Take 1 tablet (20 mg total) by mouth once daily.    vitamin D (VITAMIN D3) 1000 units Tab Take 1,000 Units by mouth once daily.    ondansetron (ZOFRAN) 4 MG tablet Take 1 tablet (4 mg total) by mouth every 8 (eight) hours as needed for Nausea.    [DISCONTINUED] heparin sod,pork in 0.45% NaCl (HEPARIN, PORCINE, 100 UNITS) 100 unit/100 mL (1 unit/mL) SolP in sodium chloride 0.45 % 100 mL infusion Inject into the vein continuous.    [DISCONTINUED] polyethylene glycol (GLYCOLAX) 17 gram PwPk Take 17 g by mouth 2 (two) times daily as needed (Constipation).     Family History     Problem Relation (Age of Onset)    Heart attack Father    Hypertension Father    Pancreatic cancer Mother        Tobacco Use    Smoking status: Never Smoker    Smokeless tobacco: Never Used   Substance and Sexual Activity    Alcohol use: No    Drug use: No    Sexual activity: Not on file     Review of Systems   Constitutional: Negative for chills and fever.   HENT: Negative for congestion,  postnasal drip and rhinorrhea.    Respiratory: Negative for cough, chest tightness and shortness of breath.    Cardiovascular: Positive for leg swelling (left lower extremity). Negative for chest pain.   Gastrointestinal: Negative for abdominal distention, abdominal pain, nausea and vomiting.   Genitourinary: Negative for difficulty urinating.   Musculoskeletal: Negative for arthralgias and myalgias.   Skin: Negative for color change and wound.   Neurological: Negative for weakness and headaches.   Hematological: Does not bruise/bleed easily.   Psychiatric/Behavioral: Negative for agitation.     Objective:     Vital Signs (Most Recent):  Temp: 98.5 °F (36.9 °C) (01/30/20 1638)  Pulse: 83 (01/30/20 2246)  Resp: 16 (01/30/20 1900)  BP: (!) 163/70 (01/30/20 2201)  SpO2: 97 % (01/30/20 2201) Vital Signs (24h Range):  Temp:  [98.5 °F (36.9 °C)] 98.5 °F (36.9 °C)  Pulse:  [82-98] 83  Resp:  [14-18] 16  SpO2:  [95 %-99 %] 97 %  BP: (140-185)/(60-72) 163/70     Weight: 57.2 kg (126 lb)  Body mass index is 27.27 kg/m².    Physical Exam   Constitutional: She is oriented to person, place, and time. She appears well-developed and well-nourished.   HENT:   Head: Normocephalic and atraumatic.   Eyes: Pupils are equal, round, and reactive to light. EOM are normal.   Neck: Normal range of motion. Neck supple.   Cardiovascular: Normal rate.   Pulmonary/Chest: Effort normal and breath sounds normal.   Abdominal: Soft. Bowel sounds are normal.   Musculoskeletal: She exhibits edema (left lower extremity). She exhibits no deformity.   Neurological: She is alert and oriented to person, place, and time.   Skin: Skin is warm and dry.   Psychiatric: She has a normal mood and affect. Her behavior is normal.         CRANIAL NERVES     CN III, IV, VI   Pupils are equal, round, and reactive to light.  Extraocular motions are normal.        Significant Labs:   BMP:   Recent Labs   Lab 01/30/20  1713   GLU 97   *   K 4.3   CL 93*   CO2 31*    BUN 31*   CREATININE 0.7   CALCIUM 9.2     CBC:   Recent Labs   Lab 01/30/20  1713   WBC 8.23   HGB 9.6*   HCT 29.6*        CMP:   Recent Labs   Lab 01/30/20  1713   *   K 4.3   CL 93*   CO2 31*   GLU 97   BUN 31*   CREATININE 0.7   CALCIUM 9.2   PROT 7.2   ALBUMIN 3.4*   BILITOT 0.2   ALKPHOS 148*   AST 30   ALT 32   ANIONGAP 7*   EGFRNONAA >60     Coagulation:   Recent Labs   Lab 01/30/20  1713   INR 0.9     TSH:   Recent Labs   Lab 11/01/19  1213   TSH 0.507       Significant Imaging: I have reviewed all pertinent imaging results/findings within the past 24 hours.

## 2020-01-31 NOTE — SUBJECTIVE & OBJECTIVE
Interval History: awake and alert, leg swelling stable. Pedal pulse present  Cardiology rec's pending.   Continue weight base Lovenox    Review of Systems   Constitutional: Negative for chills and fever.   Respiratory: Negative for cough, chest tightness and shortness of breath.    Cardiovascular: Positive for leg swelling (left lower extremity). Negative for chest pain.   Gastrointestinal: Negative for abdominal distention, abdominal pain, nausea and vomiting.   Musculoskeletal: Positive for arthralgias and joint swelling.   Skin: Positive for pallor. Negative for color change.   Psychiatric/Behavioral: Negative for agitation.     Objective:     Vital Signs (Most Recent):  Temp: 97.2 °F (36.2 °C) (01/31/20 0505)  Pulse: 75 (01/31/20 0816)  Resp: 16 (01/31/20 0816)  BP: (!) 101/51 (01/31/20 0505)  SpO2: 95 % (01/31/20 0816) Vital Signs (24h Range):  Temp:  [97.2 °F (36.2 °C)-98.5 °F (36.9 °C)] 97.2 °F (36.2 °C)  Pulse:  [75-98] 75  Resp:  [14-18] 16  SpO2:  [90 %-99 %] 95 %  BP: (101-185)/(51-72) 101/51     Weight: 89 kg (196 lb 3.4 oz)(zero bed last night)  Body mass index is 42.46 kg/m².    Intake/Output Summary (Last 24 hours) at 1/31/2020 0905  Last data filed at 1/31/2020 0549  Gross per 24 hour   Intake 30 ml   Output 450 ml   Net -420 ml      Physical Exam   Constitutional: She is oriented to person, place, and time. She appears well-developed and well-nourished.   HENT:   Head: Normocephalic and atraumatic.   Neck: Neck supple.   Cardiovascular: Normal rate.   Pulmonary/Chest: Effort normal and breath sounds normal.   Abdominal: Soft. Bowel sounds are normal.   Musculoskeletal: She exhibits edema (left lower extremity). She exhibits no deformity.   Neurological: She is alert and oriented to person, place, and time.   Skin: Skin is warm and dry.   Psychiatric: She has a normal mood and affect.       Significant Labs:   CBC:   Recent Labs   Lab 01/30/20  1713 01/31/20  0419   WBC 8.23 6.14   HGB 9.6* 9.0*    HCT 29.6* 27.5*    219     CMP:   Recent Labs   Lab 01/30/20  1713 01/31/20  0419   * 132*   K 4.3 4.1   CL 93* 96   CO2 31* 28   GLU 97 101   BUN 31* 27*   CREATININE 0.7 0.7   CALCIUM 9.2 8.8   PROT 7.2 6.6   ALBUMIN 3.4* 3.1*   BILITOT 0.2 0.2   ALKPHOS 148* 119   AST 30 23   ALT 32 27   ANIONGAP 7* 8   EGFRNONAA >60 >60     Cardiac Markers: No results for input(s): CKMB, MYOGLOBIN, BNP, TROPISTAT in the last 48 hours.  Coagulation:   Recent Labs   Lab 01/31/20  0419   INR 1.0   APTT 40.2*     Troponin: No results for input(s): TROPONINI in the last 48 hours.  TSH:   Recent Labs   Lab 11/01/19  1213   TSH 0.507     Urine Culture: No results for input(s): LABURIN in the last 48 hours.    Significant Imaging: I have reviewed all pertinent imaging results/findings within the past 24 hours.

## 2020-01-31 NOTE — HPI
Annette Garcia is an 84 year old white woman with peripheral vascular disease, renovascular hypertension, renal artery stenosis status post left renal artery stent placement on 7/19/17, coronary artery disease with history of myocardial infarction, paroxysmal atrial fibrillation, diffuse large B cell lymphoma, anemia, epilepsy, hypothyroidism, chronic hyponatremia, depression, lumbar and sacroiliac joint osteoarthritis with chronic low back pain and history of lumbar spine surgery in 2013, slow transit constipation, history of appendectomy, history of cholecystectomy history hysterectomy, history of small bowel obstruction status post small bowel resection on 8/23/16, history of sigmoid colectomy with end colostomy on 10/29/19. She lives at [facility] in [place], [place]. She is wheelchair bound Her son has epilepsy. Her daughter has tuberous sclerosis. Her primary care physician is Dr. Jose Torres. Her pain management specialist is Dr. Chirag Bates. Her neurologist is Dr. Gamal Lock. Her cardiologist is Dr. Timmy Simmons   She was seen in clinic by Dr. Simmons on 1/30/20 to discuss results of her Holter monitor. Dr. Simmons noticed she had leg swelling and advised her to go to [facility], where venous ultrasound showed thrombosis in the left common femoral, femoral, greater saphenous, and popliteal veins. Labs were significant for hyponatremia (sodium 131 mmol/L, decreased from 137 on 11/22/19). She was admitted to [facility].

## 2020-01-31 NOTE — NURSING
"Left leg remained straight via shift and  no movement as stated by pt. Stated "I didn't move my leg since moo been in this room."bilateral PP noted, dr márquez at , doppler to left PP used, pulse confirmed. Right PP palpatable.  "

## 2020-01-31 NOTE — PLAN OF CARE
Pt reports she lives at [facility] and is w/c bound. Pt anticipates returning when stable and informed [facility] transportation brings her to PCP appts.  Tn gave pt d/c folder, brochure, and card and encouraged to call for any needs/concerns.    Dr. Troy ferguson    Tn sent clinicals via [facility] to [facility].       01/31/20 9807   Discharge Assessment   Assessment Type Discharge Planning Assessment   Confirmed/corrected address and phone number on facesheet? Yes  (mailing address)   Assessment information obtained from? Patient   Expected Length of Stay (days) 2   Communicated expected length of stay with patient/caregiver yes   Prior to hospitilization cognitive status: Alert/Oriented   Prior to hospitalization functional status: Wheelchair Bound;Partially Dependent;Needs Assistance   Current cognitive status: Alert/Oriented   Current Functional Status: Partially Dependent;Needs Assistance   Facility Arrived From: [facility]   Lives With facility resident   Able to Return to Prior Arrangements yes   Is patient able to care for self after discharge? No   Who are your caregiver(s) and their phone number(s)? Beatris (daughter) [phone]   Patient's perception of discharge disposition nursing home   Readmission Within the Last 30 Days no previous admission in last 30 days   Patient currently being followed by outpatient case management? No   Patient currently receives any other outside agency services? No   Equipment Currently Used at Home wheelchair;hospital bed   Do you have any problems affording any of your prescribed medications? No   Is the patient taking medications as prescribed? yes   Does the patient have transportation home? Yes   Transportation Anticipated agency   Does the patient receive services at [facility]? No   Discharge Plan A Return to nursing home   Discharge Plan B Skilled Nursing Facility   DME Needed Upon Discharge  none   Patient/Family in Agreement with Plan yes

## 2020-01-31 NOTE — PROGRESS NOTES
[facility] Hospital Medicine  Progress Note    Patient Name: Annette Garcia  MRN: [id number]  Patient Class: IP- Inpatient   Admission Date: 1/30/2020  Length of Stay: 1 days  Attending Physician: Alice Barker*  Primary Care Provider: Jose Valles MD        Subjective:     Principal Problem:Acute deep vein thrombosis (DVT) of femoral vein of left lower extremity        HPI:  84 y.o. female with history of HTN, hypothyroidism, epilepsy, atrial fibrillation, allergy, colon cancer and scoliosis of lumbar spine who presents to the ED complaining of left leg swelling. She was seen this morning by her cardiologist, Dr. Medina, to receive results of Holter monitor test.  During her exam, cardiologist noticed leg swelling and referred patient to ED for further evaluation.  She denies leg pain, chest pain, lightheadedness, shortness of breath.  Denies taking blood thinners currently.  ED findings: Ultrasound showed thrombosis within the left common femoral, femoral, greater saphenous, and popliteal veins, H/H 9.6/29.6, Na 131.  Patient admitted to [facility] for further medical management and possible cardiology intervention.    Of note, patient currently resides at [facility].  She is wheelchair bound. Denies having any leg pain, CP, lightheadedness, or SOB. Not taking any blood thinners currently. Pt lives in a nursing home.    Overview/Hospital Course:  No notes on file    Interval History: awake and alert, leg swelling stable. Pedal pulse present  Cardiology rec's pending.   Continue weight base Lovenox    Review of Systems   Constitutional: Negative for chills and fever.   Respiratory: Negative for cough, chest tightness and shortness of breath.    Cardiovascular: Positive for leg swelling (left lower extremity). Negative for chest pain.   Gastrointestinal: Negative for abdominal distention, abdominal pain, nausea and vomiting.   Musculoskeletal: Positive for arthralgias and  joint swelling.   Skin: Positive for pallor. Negative for color change.   Psychiatric/Behavioral: Negative for agitation.     Objective:     Vital Signs (Most Recent):  Temp: 97.2 °F (36.2 °C) (01/31/20 0505)  Pulse: 75 (01/31/20 0816)  Resp: 16 (01/31/20 0816)  BP: (!) 101/51 (01/31/20 0505)  SpO2: 95 % (01/31/20 0816) Vital Signs (24h Range):  Temp:  [97.2 °F (36.2 °C)-98.5 °F (36.9 °C)] 97.2 °F (36.2 °C)  Pulse:  [75-98] 75  Resp:  [14-18] 16  SpO2:  [90 %-99 %] 95 %  BP: (101-185)/(51-72) 101/51     Weight: 89 kg (196 lb 3.4 oz)(zero bed last night)  Body mass index is 42.46 kg/m².    Intake/Output Summary (Last 24 hours) at 1/31/2020 0905  Last data filed at 1/31/2020 0549  Gross per 24 hour   Intake 30 ml   Output 450 ml   Net -420 ml      Physical Exam   Constitutional: She is oriented to person, place, and time. She appears well-developed and well-nourished.   HENT:   Head: Normocephalic and atraumatic.   Neck: Neck supple.   Cardiovascular: Normal rate.   Pulmonary/Chest: Effort normal and breath sounds normal.   Abdominal: Soft. Bowel sounds are normal.   Musculoskeletal: She exhibits edema (left lower extremity). She exhibits no deformity.   Neurological: She is alert and oriented to person, place, and time.   Skin: Skin is warm and dry.   Psychiatric: She has a normal mood and affect.       Significant Labs:   CBC:   Recent Labs   Lab 01/30/20 1713 01/31/20 0419   WBC 8.23 6.14   HGB 9.6* 9.0*   HCT 29.6* 27.5*    219     CMP:   Recent Labs   Lab 01/30/20 1713 01/31/20 0419   * 132*   K 4.3 4.1   CL 93* 96   CO2 31* 28   GLU 97 101   BUN 31* 27*   CREATININE 0.7 0.7   CALCIUM 9.2 8.8   PROT 7.2 6.6   ALBUMIN 3.4* 3.1*   BILITOT 0.2 0.2   ALKPHOS 148* 119   AST 30 23   ALT 32 27   ANIONGAP 7* 8   EGFRNONAA >60 >60     Cardiac Markers: No results for input(s): CKMB, MYOGLOBIN, BNP, TROPISTAT in the last 48 hours.  Coagulation:   Recent Labs   Lab 01/31/20  0419   INR 1.0   APTT 40.2*      Troponin: No results for input(s): TROPONINI in the last 48 hours.  TSH:   Recent Labs   Lab 11/01/19  1213   TSH 0.507     Urine Culture: No results for input(s): LABURIN in the last 48 hours.    Significant Imaging: I have reviewed all pertinent imaging results/findings within the past 24 hours.      Assessment/Plan:      * Acute deep vein thrombosis (DVT) of femoral vein of left lower extremity  Therapeutic Lovenox q 12hr  Continuous cardiac monitoring  Consult cardiology: pending  Routine vital signs      Paroxysmal atrial fibrillation        Urinary retention  Patient had gonzales in place on admission 2/2 urinary retention, she follows with urology as outpatient.  Gonzales care  Continue to gonzales urology as outpatient      Slow transit constipation  Continue miralax      Hypertension  Continue clonidine and Lisinopril  Hydralazine prn      Acquired hypothyroidism  Continue synthroid      Epilepsy  Stable  Continue trileptal and phenobarbital   Seizure precautions        VTE Risk Mitigation (From admission, onward)         Ordered     enoxaparin injection 60 mg  Every 12 hours (non-standard times)      01/30/20 2036                      Alice Barker MD  Department of Hospital Medicine   [facility]

## 2020-01-31 NOTE — ED NOTES
Pt eating lenin crackers. Pt is no distress. VSS. +2 pedal pulses observed.  Will continue to monitor closely.

## 2020-02-01 LAB
APTT BLDCRRT: 124.2 SEC (ref 21–32)
APTT BLDCRRT: 46.2 SEC (ref 21–32)
APTT BLDCRRT: >150 SEC (ref 21–32)
BASOPHILS # BLD AUTO: 0.02 K/UL (ref 0–0.2)
BASOPHILS # BLD AUTO: 0.02 K/UL (ref 0–0.2)
BASOPHILS NFR BLD: 0.3 % (ref 0–1.9)
BASOPHILS NFR BLD: 0.3 % (ref 0–1.9)
DIFFERENTIAL METHOD: ABNORMAL
DIFFERENTIAL METHOD: ABNORMAL
EOSINOPHIL # BLD AUTO: 0.2 K/UL (ref 0–0.5)
EOSINOPHIL # BLD AUTO: 0.3 K/UL (ref 0–0.5)
EOSINOPHIL NFR BLD: 3 % (ref 0–8)
EOSINOPHIL NFR BLD: 3.8 % (ref 0–8)
ERYTHROCYTE [DISTWIDTH] IN BLOOD BY AUTOMATED COUNT: 14.8 % (ref 11.5–14.5)
ERYTHROCYTE [DISTWIDTH] IN BLOOD BY AUTOMATED COUNT: 14.9 % (ref 11.5–14.5)
HCT VFR BLD AUTO: 24.9 % (ref 37–48.5)
HCT VFR BLD AUTO: 25.3 % (ref 37–48.5)
HGB BLD-MCNC: 8.1 G/DL (ref 12–16)
HGB BLD-MCNC: 8.1 G/DL (ref 12–16)
LYMPHOCYTES # BLD AUTO: 1.9 K/UL (ref 1–4.8)
LYMPHOCYTES # BLD AUTO: 2.1 K/UL (ref 1–4.8)
LYMPHOCYTES NFR BLD: 31.5 % (ref 18–48)
LYMPHOCYTES NFR BLD: 31.5 % (ref 18–48)
MAGNESIUM SERPL-MCNC: 1.9 MG/DL (ref 1.6–2.6)
MCH RBC QN AUTO: 33.2 PG (ref 27–31)
MCH RBC QN AUTO: 33.6 PG (ref 27–31)
MCHC RBC AUTO-ENTMCNC: 32 G/DL (ref 32–36)
MCHC RBC AUTO-ENTMCNC: 32.5 G/DL (ref 32–36)
MCV RBC AUTO: 103 FL (ref 82–98)
MCV RBC AUTO: 104 FL (ref 82–98)
MONOCYTES # BLD AUTO: 0.7 K/UL (ref 0.3–1)
MONOCYTES # BLD AUTO: 0.8 K/UL (ref 0.3–1)
MONOCYTES NFR BLD: 10.8 % (ref 4–15)
MONOCYTES NFR BLD: 11.8 % (ref 4–15)
NEUTROPHILS # BLD AUTO: 3.3 K/UL (ref 1.8–7.7)
NEUTROPHILS # BLD AUTO: 3.4 K/UL (ref 1.8–7.7)
NEUTROPHILS NFR BLD: 52.6 % (ref 38–73)
NEUTROPHILS NFR BLD: 54.4 % (ref 38–73)
NRBC BLD-RTO: 0 /100 WBC
PHOSPHATE SERPL-MCNC: 3.5 MG/DL (ref 2.7–4.5)
PLATELET # BLD AUTO: 211 K/UL (ref 150–350)
PLATELET # BLD AUTO: 214 K/UL (ref 150–350)
PMV BLD AUTO: 8.8 FL (ref 9.2–12.9)
PMV BLD AUTO: 8.9 FL (ref 9.2–12.9)
POCT GLUCOSE: 134 MG/DL (ref 70–110)
RBC # BLD AUTO: 2.41 M/UL (ref 4–5.4)
RBC # BLD AUTO: 2.44 M/UL (ref 4–5.4)
WBC # BLD AUTO: 6.04 K/UL (ref 3.9–12.7)
WBC # BLD AUTO: 6.55 K/UL (ref 3.9–12.7)

## 2020-02-01 PROCEDURE — 63600175 PHARM REV CODE 636 W HCPCS: Performed by: FAMILY MEDICINE

## 2020-02-01 PROCEDURE — 94761 N-INVAS EAR/PLS OXIMETRY MLT: CPT

## 2020-02-01 PROCEDURE — 83735 ASSAY OF MAGNESIUM: CPT

## 2020-02-01 PROCEDURE — 85025 COMPLETE CBC W/AUTO DIFF WBC: CPT

## 2020-02-01 PROCEDURE — 85730 THROMBOPLASTIN TIME PARTIAL: CPT

## 2020-02-01 PROCEDURE — 25000003 PHARM REV CODE 250: Performed by: NURSE PRACTITIONER

## 2020-02-01 PROCEDURE — 11000001 HC ACUTE MED/SURG PRIVATE ROOM

## 2020-02-01 PROCEDURE — 85730 THROMBOPLASTIN TIME PARTIAL: CPT | Mod: 91

## 2020-02-01 PROCEDURE — 99233 SBSQ HOSP IP/OBS HIGH 50: CPT | Mod: ,,, | Performed by: STUDENT IN AN ORGANIZED HEALTH CARE EDUCATION/TRAINING PROGRAM

## 2020-02-01 PROCEDURE — 99233 PR SUBSEQUENT HOSPITAL CARE,LEVL III: ICD-10-PCS | Mod: ,,, | Performed by: STUDENT IN AN ORGANIZED HEALTH CARE EDUCATION/TRAINING PROGRAM

## 2020-02-01 PROCEDURE — 84100 ASSAY OF PHOSPHORUS: CPT

## 2020-02-01 PROCEDURE — 36415 COLL VENOUS BLD VENIPUNCTURE: CPT

## 2020-02-01 RX ORDER — LISINOPRIL 5 MG/1
10 TABLET ORAL DAILY
Status: DISCONTINUED | OUTPATIENT
Start: 2020-02-02 | End: 2020-02-03

## 2020-02-01 RX ORDER — CLONIDINE HYDROCHLORIDE 0.1 MG/1
0.2 TABLET ORAL DAILY
Status: DISCONTINUED | OUTPATIENT
Start: 2020-02-02 | End: 2020-02-03

## 2020-02-01 RX ADMIN — PRAVASTATIN SODIUM 20 MG: 10 TABLET ORAL at 08:02

## 2020-02-01 RX ADMIN — CLONIDINE HYDROCHLORIDE 0.2 MG: 0.1 TABLET ORAL at 08:02

## 2020-02-01 RX ADMIN — SODIUM CHLORIDE 500 ML: 0.9 INJECTION, SOLUTION INTRAVENOUS at 12:02

## 2020-02-01 RX ADMIN — PHENOBARBITAL 64.8 MG: 32.4 TABLET ORAL at 08:02

## 2020-02-01 RX ADMIN — HEPARIN SODIUM 18 UNITS/KG/HR: 10000 INJECTION, SOLUTION INTRAVENOUS at 04:02

## 2020-02-01 RX ADMIN — SODIUM CHLORIDE 500 ML: 0.9 INJECTION, SOLUTION INTRAVENOUS at 02:02

## 2020-02-01 RX ADMIN — OXCARBAZEPINE 300 MG: 150 TABLET, FILM COATED ORAL at 08:02

## 2020-02-01 RX ADMIN — LISINOPRIL 40 MG: 20 TABLET ORAL at 08:02

## 2020-02-01 RX ADMIN — LEVOTHYROXINE SODIUM 125 MCG: 75 TABLET ORAL at 06:02

## 2020-02-01 NOTE — PLAN OF CARE
Patient is alert, responsive. B/P continues to improve s/p 500 ml NACL bolus. Plan of care reviewed with patient. Patient verbalized understanding. Safety measures maintained. Will continue to monitor.  Problem: Fall Injury Risk  Goal: Absence of Fall and Fall-Related Injury  Outcome: Ongoing, Progressing     Problem: Adult Inpatient Plan of Care  Goal: Plan of Care Review  Outcome: Ongoing, Progressing     Problem: Venous Thromboembolism  Goal: VTE (Venous Thromboembolism) Symptom Resolution  Outcome: Ongoing, Progressing     Problem: Hypertension Comorbidity  Goal: Blood Pressure in Desired Range  Outcome: Ongoing, Progressing

## 2020-02-01 NOTE — CODE/ RAPID DOCUMENTATION
MET called. Resp arrived at 0148. Pt SATS are 96% on RA. No EKG obtained. No ABG obtained. House Supervisor dismissed Resp. Will continue to monitor pt.

## 2020-02-01 NOTE — SUBJECTIVE & OBJECTIVE
Interval History: awake and alert, leg swelling improved, erythema better. Pedal pulse present  Cardiology rec's: planned for possible aspiration thrombectomy +/- tpa infusion and likely start OAC over the weekend.  Weight base Lovenox switched to heparin on 1/30    Review of Systems   Constitutional: Negative for chills and fever.   Respiratory: Negative for cough, chest tightness and shortness of breath.    Cardiovascular: Positive for leg swelling (left lower extremity). Negative for chest pain.   Gastrointestinal: Negative for abdominal distention, abdominal pain, nausea and vomiting.   Musculoskeletal: Positive for arthralgias and joint swelling.   Skin: Positive for pallor. Negative for color change.   Psychiatric/Behavioral: Negative for agitation.     Objective:     Vital Signs (Most Recent):  Temp: 97.9 °F (36.6 °C) (02/01/20 0758)  Pulse: 79 (02/01/20 0758)  Resp: 17 (02/01/20 0758)  BP: 135/63 (02/01/20 0758)  SpO2: 95 % (02/01/20 0758) Vital Signs (24h Range):  Temp:  [96.3 °F (35.7 °C)-98.4 °F (36.9 °C)] 97.9 °F (36.6 °C)  Pulse:  [70-86] 79  Resp:  [16-17] 17  SpO2:  [93 %-96 %] 95 %  BP: ()/(38-69) 135/63     Weight: 84.4 kg (186 lb 1.1 oz)  Body mass index is 40.26 kg/m².    Intake/Output Summary (Last 24 hours) at 2/1/2020 0950  Last data filed at 2/1/2020 0600  Gross per 24 hour   Intake 125 ml   Output 850 ml   Net -725 ml      Physical Exam   Constitutional: She is oriented to person, place, and time. She appears well-developed and well-nourished.   HENT:   Head: Normocephalic and atraumatic.   Neck: Neck supple.   Cardiovascular: Normal rate.   Pulmonary/Chest: Effort normal and breath sounds normal.   Abdominal: Soft. Bowel sounds are normal.   Musculoskeletal: She exhibits edema (left lower extremity). She exhibits no deformity.   Neurological: She is alert and oriented to person, place, and time.   Skin: Skin is warm and dry.   Psychiatric: She has a normal mood and affect.        Significant Labs:   CBC:   Recent Labs   Lab 01/31/20  0419 01/31/20  1751 02/01/20  0358   WBC 6.14 6.01 6.55  6.04   HGB 9.0* 9.4* 8.1*  8.1*   HCT 27.5* 29.7* 24.9*  25.3*    214 214  211     CMP:   Recent Labs   Lab 01/30/20  1713 01/31/20  0419   * 132*   K 4.3 4.1   CL 93* 96   CO2 31* 28   GLU 97 101   BUN 31* 27*   CREATININE 0.7 0.7   CALCIUM 9.2 8.8   PROT 7.2 6.6   ALBUMIN 3.4* 3.1*   BILITOT 0.2 0.2   ALKPHOS 148* 119   AST 30 23   ALT 32 27   ANIONGAP 7* 8   EGFRNONAA >60 >60     Cardiac Markers: No results for input(s): CKMB, MYOGLOBIN, BNP, TROPISTAT in the last 48 hours.  Coagulation:   Recent Labs   Lab 01/31/20  1750  02/01/20  0358   INR 1.0  --   --    APTT 37.2*   < > >150.0*    < > = values in this interval not displayed.     Troponin: No results for input(s): TROPONINI in the last 48 hours.  TSH:   Recent Labs   Lab 11/01/19  1213   TSH 0.507     Urine Culture: No results for input(s): LABURIN in the last 48 hours.    Significant Imaging: I have reviewed all pertinent imaging results/findings within the past 24 hours.

## 2020-02-01 NOTE — NURSING
Pt BP upon rounds was 73/36. Pt AAOx4. Pt denies pain or discomfort. Notified Dr Bauer of pts BP at this time. Will start pt on bolus of NS and monitor.

## 2020-02-01 NOTE — PLAN OF CARE
Patient AAOx3, able to voice concerns, vital signs stable, shows no acute signs of distress, patient and family verbally understands plan of care, 96 on RA, normal sinus rhythm on tele, family was notified by nurse that patient was still NPO until procedure, c/o of 0 pain, bed in lowest position with bed alarm in place, personal items and call light in reach, will relay to oncoming nurse inn charge of patients care.

## 2020-02-01 NOTE — NURSING
APTT drawn by lab at 0030 and has not resulted as of 0345. Lab called for results, not available. Order put in for Stat redraw. Will continue to monitor.

## 2020-02-01 NOTE — HOSPITAL COURSE
She was put on enoxaparin then switched to heparin. Dr. Simmons performed peripheral mechanical thrombectomy with stent placement on 2/3/20. She was started on apixaban. She was prescribed apixaban and discharged home.

## 2020-02-01 NOTE — PLAN OF CARE
"ERIC Mayo NP notified that blood pressure 90/51 HR 68 and that patient states she feels "fine". No new orders received.   "

## 2020-02-01 NOTE — ASSESSMENT & PLAN NOTE
Therapeutic Lovenox q 12hr  Continuous cardiac monitoring  Consult cardiology: planned for possible aspiration thrombectomy +/- tpa infusion and likely start OAC over the weekend. left CFA, saphenous and pop  Routine vital signs

## 2020-02-01 NOTE — NURSING
Obtained APTT results from lad, Results of APTT greater than 150. Will follow nomogram. Patient is asymptomatic.

## 2020-02-01 NOTE — PLAN OF CARE
"ERIC Mayo made aware of aPTT>150 and that heparin gtt has been placed on hold and gave order to call cardiology    0642  Dr. Araujo made aware of aPTT>150 and that heparin gtt is on hold and per the nomogram the drip is due to be restarted in 2 hours at a reduced rate by 4u/kg/hr.  Dr. Araujo stated "that's fine."  "

## 2020-02-01 NOTE — SIGNIFICANT EVENT
MET called at 1:45am 2/2 patient's low blood pressure.  SBP noted to be in high 70s.  Arrived to bedside at 1:55am.  Patient resting in bed, smiling, no signs of distress.  Immediately ended MET, 2/2 patient not appearing in any distress.  Ordered 500ml fluid bolus.  Patient's blood pressure increased to 100/49, will continue to monitor.

## 2020-02-01 NOTE — PROGRESS NOTES
[facility] Hospital Medicine  Progress Note    Patient Name: Annette Garcia  MRN: [id number]  Patient Class: IP- Inpatient   Admission Date: 1/30/2020  Length of Stay: 2 days  Attending Physician: Alice Barker*  Primary Care Provider: Jose Valles MD        Subjective:     Principal Problem:Acute deep vein thrombosis (DVT) of femoral vein of left lower extremity        HPI:  84 y.o. female with history of HTN, hypothyroidism, epilepsy, atrial fibrillation, allergy, colon cancer and scoliosis of lumbar spine who presents to the ED complaining of left leg swelling. She was seen this morning by her cardiologist, Dr. Medina, to receive results of Holter monitor test.  During her exam, cardiologist noticed leg swelling and referred patient to ED for further evaluation.  She denies leg pain, chest pain, lightheadedness, shortness of breath.  Denies taking blood thinners currently.  ED findings: Ultrasound showed thrombosis within the left common femoral, femoral, greater saphenous, and popliteal veins, H/H 9.6/29.6, Na 131.  Patient admitted to [facility] for further medical management and possible cardiology intervention.    Of note, patient currently resides at [facility].  She is wheelchair bound. Denies having any leg pain, CP, lightheadedness, or SOB. Not taking any blood thinners currently. Pt lives in a nursing home.    Overview/Hospital Course:  Admitted with left CFA, saphenous and pop DVT started on weight based Lovenox and switched to heparin. Cardiology planned for possible aspiration thrombectomy +/- tpa infusion and likely start OAC over the weekend.    Interval History: awake and alert, leg swelling improved, erythema better. Pedal pulse present  Cardiology rec's: planned for possible aspiration thrombectomy +/- tpa infusion and likely start OAC over the weekend.  Weight base Lovenox switched to heparin on 1/30    Review of Systems   Constitutional: Negative for  chills and fever.   Respiratory: Negative for cough, chest tightness and shortness of breath.    Cardiovascular: Positive for leg swelling (left lower extremity). Negative for chest pain.   Gastrointestinal: Negative for abdominal distention, abdominal pain, nausea and vomiting.   Musculoskeletal: Positive for arthralgias and joint swelling.   Skin: Positive for pallor. Negative for color change.   Psychiatric/Behavioral: Negative for agitation.     Objective:     Vital Signs (Most Recent):  Temp: 97.9 °F (36.6 °C) (02/01/20 0758)  Pulse: 79 (02/01/20 0758)  Resp: 17 (02/01/20 0758)  BP: 135/63 (02/01/20 0758)  SpO2: 95 % (02/01/20 0758) Vital Signs (24h Range):  Temp:  [96.3 °F (35.7 °C)-98.4 °F (36.9 °C)] 97.9 °F (36.6 °C)  Pulse:  [70-86] 79  Resp:  [16-17] 17  SpO2:  [93 %-96 %] 95 %  BP: ()/(38-69) 135/63     Weight: 84.4 kg (186 lb 1.1 oz)  Body mass index is 40.26 kg/m².    Intake/Output Summary (Last 24 hours) at 2/1/2020 0950  Last data filed at 2/1/2020 0600  Gross per 24 hour   Intake 125 ml   Output 850 ml   Net -725 ml      Physical Exam   Constitutional: She is oriented to person, place, and time. She appears well-developed and well-nourished.   HENT:   Head: Normocephalic and atraumatic.   Neck: Neck supple.   Cardiovascular: Normal rate.   Pulmonary/Chest: Effort normal and breath sounds normal.   Abdominal: Soft. Bowel sounds are normal.   Musculoskeletal: She exhibits edema (left lower extremity). She exhibits no deformity.   Neurological: She is alert and oriented to person, place, and time.   Skin: Skin is warm and dry.   Psychiatric: She has a normal mood and affect.       Significant Labs:   CBC:   Recent Labs   Lab 01/31/20  0419 01/31/20  1751 02/01/20  0358   WBC 6.14 6.01 6.55  6.04   HGB 9.0* 9.4* 8.1*  8.1*   HCT 27.5* 29.7* 24.9*  25.3*    214 214  211     CMP:   Recent Labs   Lab 01/30/20  1713 01/31/20  0419   * 132*   K 4.3 4.1   CL 93* 96   CO2 31* 28   GLU 97  101   BUN 31* 27*   CREATININE 0.7 0.7   CALCIUM 9.2 8.8   PROT 7.2 6.6   ALBUMIN 3.4* 3.1*   BILITOT 0.2 0.2   ALKPHOS 148* 119   AST 30 23   ALT 32 27   ANIONGAP 7* 8   EGFRNONAA >60 >60     Cardiac Markers: No results for input(s): CKMB, MYOGLOBIN, BNP, TROPISTAT in the last 48 hours.  Coagulation:   Recent Labs   Lab 01/31/20  1750  02/01/20  0358   INR 1.0  --   --    APTT 37.2*   < > >150.0*    < > = values in this interval not displayed.     Troponin: No results for input(s): TROPONINI in the last 48 hours.  TSH:   Recent Labs   Lab 11/01/19  1213   TSH 0.507     Urine Culture: No results for input(s): LABURIN in the last 48 hours.    Significant Imaging: I have reviewed all pertinent imaging results/findings within the past 24 hours.      Assessment/Plan:      * Acute deep vein thrombosis (DVT) of femoral vein of left lower extremity  Therapeutic Lovenox q 12hr  Continuous cardiac monitoring  Consult cardiology: planned for possible aspiration thrombectomy +/- tpa infusion and likely start OAC over the weekend. left CFA, saphenous and pop  Routine vital signs      Paroxysmal atrial fibrillation        Urinary retention  Patient had gonzales in place on admission 2/2 urinary retention, she follows with urology as outpatient.  Gonzales care  Continue to gonzales urology as outpatient      Slow transit constipation  Continue miralax      Hypertension  Continue clonidine and Lisinopril  Hydralazine prn      Acquired hypothyroidism  Continue synthroid      Epilepsy  Stable  Continue trileptal and phenobarbital   Seizure precautions        VTE Risk Mitigation (From admission, onward)         Ordered     heparin 25,000 units in dextrose 5% 250 mL (100 units/mL) infusion HIGH INTENSITY nomogram - [facility]  Continuous     Question:  Heparin Infusion Adjustment (DO NOT MODIFY ANSWER)  Answer:  [email or web address]\epic\Images\Pharmacy\HeparinInfusions\heparin HIGH INTENSITY nomogram for [facility] [id number]    01/31/20 1741     heparin 25,000  units in dextrose 5% (100 units/ml) IV bolus from bag - ADDITIONAL PRN BOLUS - 60 units/kg  As needed (PRN)     Question:  Heparin Infusion Adjustment (DO NOT MODIFY ANSWER)  Answer:  [email or web address]\epic\Images\Pharmacy\HeparinInfusions\heparin HIGH INTENSITY nomogram for [facility] [id number]    01/31/20 1741     heparin 25,000 units in dextrose 5% (100 units/ml) IV bolus from bag - ADDITIONAL PRN BOLUS - 30 units/kg  As needed (PRN)     Question:  Heparin Infusion Adjustment (DO NOT MODIFY ANSWER)  Answer:  [email or web address]\epic\Images\Pharmacy\HeparinInfusions\heparin HIGH INTENSITY nomogram for [facility] [id number]    01/31/20 1741                      Alice Barker MD  Department of Hospital Medicine   [facility]

## 2020-02-02 LAB
APTT BLDCRRT: 40.9 SEC (ref 21–32)
APTT BLDCRRT: 43.1 SEC (ref 21–32)
APTT BLDCRRT: 92.3 SEC (ref 21–32)
BASOPHILS # BLD AUTO: 0.01 K/UL (ref 0–0.2)
BASOPHILS NFR BLD: 0.2 % (ref 0–1.9)
DIFFERENTIAL METHOD: ABNORMAL
EOSINOPHIL # BLD AUTO: 0.2 K/UL (ref 0–0.5)
EOSINOPHIL NFR BLD: 4.5 % (ref 0–8)
ERYTHROCYTE [DISTWIDTH] IN BLOOD BY AUTOMATED COUNT: 14.7 % (ref 11.5–14.5)
HCT VFR BLD AUTO: 24.2 % (ref 37–48.5)
HGB BLD-MCNC: 7.9 G/DL (ref 12–16)
LYMPHOCYTES # BLD AUTO: 2.1 K/UL (ref 1–4.8)
LYMPHOCYTES NFR BLD: 41.2 % (ref 18–48)
MAGNESIUM SERPL-MCNC: 1.8 MG/DL (ref 1.6–2.6)
MCH RBC QN AUTO: 33.6 PG (ref 27–31)
MCHC RBC AUTO-ENTMCNC: 32.6 G/DL (ref 32–36)
MCV RBC AUTO: 103 FL (ref 82–98)
MONOCYTES # BLD AUTO: 0.5 K/UL (ref 0.3–1)
MONOCYTES NFR BLD: 10.1 % (ref 4–15)
NEUTROPHILS # BLD AUTO: 2.2 K/UL (ref 1.8–7.7)
NEUTROPHILS NFR BLD: 44 % (ref 38–73)
PHOSPHATE SERPL-MCNC: 2.8 MG/DL (ref 2.7–4.5)
PLATELET # BLD AUTO: 196 K/UL (ref 150–350)
PMV BLD AUTO: 8.6 FL (ref 9.2–12.9)
RBC # BLD AUTO: 2.35 M/UL (ref 4–5.4)
WBC # BLD AUTO: 5.07 K/UL (ref 3.9–12.7)

## 2020-02-02 PROCEDURE — 99232 SBSQ HOSP IP/OBS MODERATE 35: CPT | Mod: ,,, | Performed by: STUDENT IN AN ORGANIZED HEALTH CARE EDUCATION/TRAINING PROGRAM

## 2020-02-02 PROCEDURE — 63600175 PHARM REV CODE 636 W HCPCS: Performed by: FAMILY MEDICINE

## 2020-02-02 PROCEDURE — 85025 COMPLETE CBC W/AUTO DIFF WBC: CPT

## 2020-02-02 PROCEDURE — 11000001 HC ACUTE MED/SURG PRIVATE ROOM

## 2020-02-02 PROCEDURE — 25000003 PHARM REV CODE 250: Performed by: FAMILY MEDICINE

## 2020-02-02 PROCEDURE — 85730 THROMBOPLASTIN TIME PARTIAL: CPT | Mod: 91

## 2020-02-02 PROCEDURE — 36415 COLL VENOUS BLD VENIPUNCTURE: CPT

## 2020-02-02 PROCEDURE — 84100 ASSAY OF PHOSPHORUS: CPT

## 2020-02-02 PROCEDURE — 99232 PR SUBSEQUENT HOSPITAL CARE,LEVL II: ICD-10-PCS | Mod: ,,, | Performed by: STUDENT IN AN ORGANIZED HEALTH CARE EDUCATION/TRAINING PROGRAM

## 2020-02-02 PROCEDURE — 25000003 PHARM REV CODE 250: Performed by: NURSE PRACTITIONER

## 2020-02-02 PROCEDURE — 83735 ASSAY OF MAGNESIUM: CPT

## 2020-02-02 PROCEDURE — 94761 N-INVAS EAR/PLS OXIMETRY MLT: CPT

## 2020-02-02 RX ADMIN — LEVOTHYROXINE SODIUM 125 MCG: 75 TABLET ORAL at 06:02

## 2020-02-02 RX ADMIN — LISINOPRIL 10 MG: 5 TABLET ORAL at 09:02

## 2020-02-02 RX ADMIN — ACETAMINOPHEN 650 MG: 325 TABLET ORAL at 07:02

## 2020-02-02 RX ADMIN — PHENOBARBITAL 64.8 MG: 32.4 TABLET ORAL at 09:02

## 2020-02-02 RX ADMIN — PRAVASTATIN SODIUM 20 MG: 10 TABLET ORAL at 09:02

## 2020-02-02 RX ADMIN — CLONIDINE HYDROCHLORIDE 0.2 MG: 0.1 TABLET ORAL at 09:02

## 2020-02-02 RX ADMIN — OXCARBAZEPINE 300 MG: 150 TABLET, FILM COATED ORAL at 09:02

## 2020-02-02 RX ADMIN — HEPARIN SODIUM 6 UNITS/KG/HR: 10000 INJECTION, SOLUTION INTRAVENOUS at 04:02

## 2020-02-02 NOTE — SUBJECTIVE & OBJECTIVE
Interval History: awake and alert, leg swelling improved, erythema better. Pedal pulse present  Cardiology rec's: repeat doppler on Monday, and reconsider for thrombosis.      Review of Systems   Constitutional: Negative for chills and fever.   Respiratory: Negative for cough, chest tightness and shortness of breath.    Cardiovascular: Positive for leg swelling (left lower extremity). Negative for chest pain.   Gastrointestinal: Negative for abdominal distention, abdominal pain, nausea and vomiting.   Musculoskeletal: Positive for arthralgias and joint swelling.   Skin: Positive for pallor. Negative for color change.   Psychiatric/Behavioral: Negative for agitation.     Objective:     Vital Signs (Most Recent):  Temp: 98.2 °F (36.8 °C) (02/02/20 0430)  Pulse: 79 (02/02/20 0434)  Resp: 16 (02/02/20 0430)  BP: (!) 146/68 (02/02/20 0430)  SpO2: 95 % (02/02/20 0430) Vital Signs (24h Range):  Temp:  [97.5 °F (36.4 °C)-98.7 °F (37.1 °C)] 98.2 °F (36.8 °C)  Pulse:  [64-79] 79  Resp:  [16-18] 16  SpO2:  [94 %-98 %] 95 %  BP: ()/(36-68) 146/68     Weight: 84.4 kg (186 lb 1.1 oz)  Body mass index is 40.26 kg/m².    Intake/Output Summary (Last 24 hours) at 2/2/2020 0828  Last data filed at 2/2/2020 0400  Gross per 24 hour   Intake 125 ml   Output 975 ml   Net -850 ml      Physical Exam   Constitutional: She is oriented to person, place, and time. She appears well-developed and well-nourished.   HENT:   Head: Normocephalic and atraumatic.   Neck: Neck supple.   Cardiovascular: Normal rate.   Pulmonary/Chest: Effort normal and breath sounds normal.   Abdominal: Soft. Bowel sounds are normal.   Musculoskeletal: She exhibits edema (left lower extremity). She exhibits no deformity.   Neurological: She is alert and oriented to person, place, and time.   Skin: Skin is warm and dry.   Psychiatric: She has a normal mood and affect.       Significant Labs:   CBC:   Recent Labs   Lab 01/31/20  1751 02/01/20  0358 02/02/20  0139    WBC 6.01 6.55  6.04 5.07   HGB 9.4* 8.1*  8.1* 7.9*   HCT 29.7* 24.9*  25.3* 24.2*    214  211 196     CMP:   No results for input(s): NA, K, CL, CO2, GLU, BUN, CREATININE, CALCIUM, PROT, ALBUMIN, BILITOT, ALKPHOS, AST, ALT, ANIONGAP, EGFRNONAA in the last 48 hours.    Invalid input(s): ESTGFAFRICA  Cardiac Markers: No results for input(s): CKMB, MYOGLOBIN, BNP, TROPISTAT in the last 48 hours.  Coagulation:   Recent Labs   Lab 01/31/20  1750  02/02/20  0140   INR 1.0  --   --    APTT 37.2*   < > 92.3*    < > = values in this interval not displayed.     Troponin: No results for input(s): TROPONINI in the last 48 hours.  TSH:   Recent Labs   Lab 11/01/19  1213   TSH 0.507     Urine Culture: No results for input(s): LABURIN in the last 48 hours.    Significant Imaging: I have reviewed all pertinent imaging results/findings within the past 24 hours.

## 2020-02-02 NOTE — PROGRESS NOTES
[facility] Hospital Medicine  Progress Note    Patient Name: Annette Garcia  MRN: [id number]  Patient Class: IP- Inpatient   Admission Date: 1/30/2020  Length of Stay: 3 days  Attending Physician: Alice Barker*  Primary Care Provider: Jose Valles MD        Subjective:     Principal Problem:Acute deep vein thrombosis (DVT) of femoral vein of left lower extremity        HPI:  84 y.o. female with history of HTN, hypothyroidism, epilepsy, atrial fibrillation, allergy, colon cancer and scoliosis of lumbar spine who presents to the ED complaining of left leg swelling. She was seen this morning by her cardiologist, Dr. Medina, to receive results of Holter monitor test.  During her exam, cardiologist noticed leg swelling and referred patient to ED for further evaluation.  She denies leg pain, chest pain, lightheadedness, shortness of breath.  Denies taking blood thinners currently.  ED findings: Ultrasound showed thrombosis within the left common femoral, femoral, greater saphenous, and popliteal veins, H/H 9.6/29.6, Na 131.  Patient admitted to [facility] for further medical management and possible cardiology intervention.    Of note, patient currently resides at [facility].  She is wheelchair bound. Denies having any leg pain, CP, lightheadedness, or SOB. Not taking any blood thinners currently. Pt lives in a nursing home.    Overview/Hospital Course:  Admitted with left CFA, saphenous and pop DVT started on weight based Lovenox and switched to heparin. Cardiology planned for possible aspiration thrombectomy +/- tpa infusion and likely start OAC over the weekend.    Interval History: awake and alert, leg swelling improved, erythema better. Pedal pulse present  Cardiology rec's: repeat doppler on Monday, and reconsider for thrombosis.      Review of Systems   Constitutional: Negative for chills and fever.   Respiratory: Negative for cough, chest tightness and shortness of  breath.    Cardiovascular: Positive for leg swelling (left lower extremity). Negative for chest pain.   Gastrointestinal: Negative for abdominal distention, abdominal pain, nausea and vomiting.   Musculoskeletal: Positive for arthralgias and joint swelling.   Skin: Positive for pallor. Negative for color change.   Psychiatric/Behavioral: Negative for agitation.     Objective:     Vital Signs (Most Recent):  Temp: 98.2 °F (36.8 °C) (02/02/20 0430)  Pulse: 79 (02/02/20 0434)  Resp: 16 (02/02/20 0430)  BP: (!) 146/68 (02/02/20 0430)  SpO2: 95 % (02/02/20 0430) Vital Signs (24h Range):  Temp:  [97.5 °F (36.4 °C)-98.7 °F (37.1 °C)] 98.2 °F (36.8 °C)  Pulse:  [64-79] 79  Resp:  [16-18] 16  SpO2:  [94 %-98 %] 95 %  BP: ()/(36-68) 146/68     Weight: 84.4 kg (186 lb 1.1 oz)  Body mass index is 40.26 kg/m².    Intake/Output Summary (Last 24 hours) at 2/2/2020 0828  Last data filed at 2/2/2020 0400  Gross per 24 hour   Intake 125 ml   Output 975 ml   Net -850 ml      Physical Exam   Constitutional: She is oriented to person, place, and time. She appears well-developed and well-nourished.   HENT:   Head: Normocephalic and atraumatic.   Neck: Neck supple.   Cardiovascular: Normal rate.   Pulmonary/Chest: Effort normal and breath sounds normal.   Abdominal: Soft. Bowel sounds are normal.   Musculoskeletal: She exhibits edema (left lower extremity). She exhibits no deformity.   Neurological: She is alert and oriented to person, place, and time.   Skin: Skin is warm and dry.   Psychiatric: She has a normal mood and affect.       Significant Labs:   CBC:   Recent Labs   Lab 01/31/20  1751 02/01/20  0358 02/02/20  0139   WBC 6.01 6.55  6.04 5.07   HGB 9.4* 8.1*  8.1* 7.9*   HCT 29.7* 24.9*  25.3* 24.2*    214  211 196     CMP:   No results for input(s): NA, K, CL, CO2, GLU, BUN, CREATININE, CALCIUM, PROT, ALBUMIN, BILITOT, ALKPHOS, AST, ALT, ANIONGAP, EGFRNONAA in the last 48 hours.    Invalid input(s):  ESTGFAFRICA  Cardiac Markers: No results for input(s): CKMB, MYOGLOBIN, BNP, TROPISTAT in the last 48 hours.  Coagulation:   Recent Labs   Lab 01/31/20  1750  02/02/20  0140   INR 1.0  --   --    APTT 37.2*   < > 92.3*    < > = values in this interval not displayed.     Troponin: No results for input(s): TROPONINI in the last 48 hours.  TSH:   Recent Labs   Lab 11/01/19  1213   TSH 0.507     Urine Culture: No results for input(s): LABURIN in the last 48 hours.    Significant Imaging: I have reviewed all pertinent imaging results/findings within the past 24 hours.      Assessment/Plan:      * Acute deep vein thrombosis (DVT) of femoral vein of left lower extremity  Therapeutic Lovenox q 12hr  Continuous cardiac monitoring  Consult cardiology: planned for possible aspiration thrombectomy +/- tpa infusion and likely start OAC over the weekend. left CFA, saphenous and pop  Routine vital signs      Paroxysmal atrial fibrillation        Urinary retention  Patient had gonzales in place on admission 2/2 urinary retention, she follows with urology as outpatient.  Gonzales care  Continue to gonzales urology as outpatient      Slow transit constipation  Continue miralax      Hypertension  Continue clonidine and Lisinopril  Hydralazine prn      Acquired hypothyroidism  Continue synthroid      Epilepsy  Stable  Continue trileptal and phenobarbital   Seizure precautions        VTE Risk Mitigation (From admission, onward)         Ordered     heparin 25,000 units in dextrose 5% 250 mL (100 units/mL) infusion HIGH INTENSITY nomogram - [facility]  Continuous     Question:  Heparin Infusion Adjustment (DO NOT MODIFY ANSWER)  Answer:  [email or web address]\epic\Images\Pharmacy\HeparinInfusions\heparin HIGH INTENSITY nomogram for [facility] [id number]    01/31/20 1741     heparin 25,000 units in dextrose 5% (100 units/ml) IV bolus from bag - ADDITIONAL PRN BOLUS - 60 units/kg  As needed (PRN)     Question:  Heparin Infusion Adjustment (DO NOT MODIFY ANSWER)  Answer:   [email or web address]\epic\Images\Pharmacy\HeparinInfusions\heparin HIGH INTENSITY nomogram for [facility] [id number].pdf    01/31/20 1741     heparin 25,000 units in dextrose 5% (100 units/ml) IV bolus from bag - ADDITIONAL PRN BOLUS - 30 units/kg  As needed (PRN)     Question:  Heparin Infusion Adjustment (DO NOT MODIFY ANSWER)  Answer:  [email or web address]\epic\Images\Pharmacy\HeparinInfusions\heparin HIGH INTENSITY nomogram for [facility] [id number].pdf    01/31/20 1741                      Alice Barker MD  Department of Hospital Medicine   [facility]

## 2020-02-02 NOTE — PLAN OF CARE
Plan of care reviewed with patient and daughters.Pt AA0x4. Pt Verbalizes to staff understanding. Pt with low BP on and off thru the day. NS bolus of 500cc given with noted improvement.Pt with 0 changes in LOC or complaints of any kind verbalized to staff. Heparin drip stopped due to apTT was critical at 124. Night nurse will restart and run heparin per nomogram orders. Dr Valle aware at this time.Less edema noted to BLE  and denies pain  when touched.Colostomy changed with noted formed brown feces in bag. Martinez catheter patent and voiding clear yellow urine in the bag. Denies any painful urination.Continue to reposition off bottom to prevent skin breakdown. NSR on monitor with 0 red alarms noted.  No acute distress observed at this time. Side rails x2, bed in low position, call bell within reach. Bed alarm in place for patient safety. Will relay to oncoming nurse to monitor for changes in condition.

## 2020-02-02 NOTE — PROGRESS NOTES
[facility]  Cardiology  Progress Note    Patient Name: Annette Garcia  MRN: [id number]  Admission Date: 1/30/2020  Hospital Length of Stay: 3 days  Code Status: Full Code   Attending Physician: Alice Barker*   Primary Care Physician: Jose Valles MD  Expected Discharge Date:   Principal Problem:Acute deep vein thrombosis (DVT) of femoral vein of left lower extremity    Subjective:     Hospital Course:   Annette Garcia is an 84 y.o. female with HTN, hypothyroidism, epilepsy, atrial fibrillation, allergy, colon cancer and scoliosis of lumbar spine who presents to the ED complaining of left leg swelling. She was sent by Dr. Medina due to marked LLE swelling.  She denies leg pain, chest pain, lightheadedness, shortness of breath. Patient is unsure how long her leg has been swollen. Ultrasound showed thrombosis within the left common femoral, femoral, greater saphenous, and popliteal veins. Patient has a remote history of colon ca with resection in 10/2019. She is wheelchair bound. No bleeding issues reported.   NPO for potential thrombectomy on Friday 1/31. Due to long case on Friday, the case had to be rescheduled for 2/3.    2/1: Pt seen and examined. No overnight event. Leg looks improved from yesterday, less edema in the ankle. Now only edema in the left foot. Discoloration has also improved. Now on heparin gtt.   2/2: doing well. No overnight issues. Will keep pt NPO aftermidnight, possible EKOS tomorrow.    Review of Systems   Constitution: Negative for chills, decreased appetite, malaise/fatigue and weight gain.   HENT: Negative for congestion and ear discharge.    Eyes: Negative for blurred vision and double vision.   Cardiovascular: Positive for leg swelling. Negative for chest pain, cyanosis, dyspnea on exertion, irregular heartbeat, near-syncope, orthopnea, palpitations and syncope.   Respiratory: Negative for cough, shortness of breath and sleep disturbances due to  breathing.    Skin: Negative for color change and dry skin.   Musculoskeletal: Positive for joint swelling. Negative for joint pain and muscle cramps.   Gastrointestinal: Negative for bloating, heartburn, hematemesis and hematochezia.   Genitourinary: Negative for bladder incontinence and dysuria.   Neurological: Negative for aphonia, excessive daytime sleepiness, dizziness, focal weakness, headaches, light-headedness, loss of balance and weakness.   Psychiatric/Behavioral: Positive for memory loss. Negative for altered mental status and depression. The patient does not have insomnia and is not nervous/anxious.      Objective:     Vital Signs (Most Recent):  Temp: 99.1 °F (37.3 °C) (02/02/20 1149)  Pulse: 77 (02/02/20 1200)  Resp: 17 (02/02/20 1149)  BP: 129/63 (02/02/20 1149)  SpO2: 96 % (02/02/20 1149) Vital Signs (24h Range):  Temp:  [97.5 °F (36.4 °C)-99.1 °F (37.3 °C)] 99.1 °F (37.3 °C)  Pulse:  [67-79] 77  Resp:  [16-18] 17  SpO2:  [94 %-98 %] 96 %  BP: (103-158)/(54-68) 129/63     Weight: 84.4 kg (186 lb 1.1 oz)  Body mass index is 40.26 kg/m².    SpO2: 96 %  O2 Device (Oxygen Therapy): room air      Intake/Output Summary (Last 24 hours) at 2/2/2020 1542  Last data filed at 2/2/2020 1000  Gross per 24 hour   Intake 125 ml   Output 1825 ml   Net -1700 ml       Lines/Drains/Airways     Central Venous Catheter Line                 Port A Cath Single Lumen 01/30/20 right atrial 3 days          Drain                 Colostomy 10/29/19 1200 LLQ 96 days         Urethral Catheter 12/02/19 62 days          Peripheral Intravenous Line                 Peripheral IV - Single Lumen 02/01/20 0208 20 G Posterior;Right Hand 1 day                Physical Exam   Constitutional: She is oriented to person, place, and time. She appears well-developed and well-nourished.   HENT:   Head: Normocephalic and atraumatic.   Eyes: Conjunctivae and EOM are normal.   Neck: Normal range of motion. Neck supple. No JVD present.    Cardiovascular: Normal rate, regular rhythm and normal heart sounds.   No murmur heard.  Pulmonary/Chest: Effort normal and breath sounds normal. No respiratory distress. She has no wheezes. She has no rales.   Abdominal: Soft. Bowel sounds are normal. She exhibits no distension.   Musculoskeletal: Normal range of motion. She exhibits edema.   Left foot is swelling, 2+ pitting edema,  1+ pitting edema on the L leg   Neurological: She is alert and oriented to person, place, and time.   Skin: Skin is warm and dry. No erythema.   Psychiatric: She has a normal mood and affect. Her behavior is normal. Judgment and thought content normal.   Nursing note and vitals reviewed.      Significant Labs:   BMP:   Recent Labs   Lab 02/01/20  0358 02/02/20  0139   MG 1.9 1.8   , CMP   No results for input(s): NA, K, CL, CO2, GLU, BUN, CREATININE, CALCIUM, PROT, ALBUMIN, BILITOT, ALKPHOS, AST, ALT, ANIONGAP, ESTGFRAFRICA, EGFRNONAA in the last 48 hours. and CBC   Recent Labs   Lab 01/31/20  1751 02/01/20  0358 02/02/20  0139   WBC 6.01 6.55  6.04 5.07   HGB 9.4* 8.1*  8.1* 7.9*   HCT 29.7* 24.9*  25.3* 24.2*    214  211 196       Significant Imaging: Echocardiogram:   2D echo with color flow doppler:   Results for orders placed or performed during the hospital encounter of 09/16/16   2D echo with color flow doppler   Result Value Ref Range    QEF 60 55 - 65    Diastolic Dysfunction No     Est. PA Systolic Pressure 17.9     Narrative    Date of Procedure: 09/16/2016        TEST DESCRIPTION   Technical Quality: This is a technically adequate study.     Aorta: The aortic root is normal in size, measuring 3.1 cm at sinotubular junction.     Left Atrium: The left atrial volume index is normal, measuring 27.41 cc/m2.     Left Ventricle: The left ventricle is normal in size, with an end-diastolic diameter of 4.3 cm, and an end-systolic diameter of 3.0 cm. LV wall thickness is normal, with the septum measuring 0.7 cm and the  posterior wall measuring 0.8 cm across. Relative   wall thickness was normal at 0.37, and the LV mass index was 67.9 g/m2 consistent with normal left ventricular mass. Global left ventricular systolic function appears normal. Visually estimated ejection fraction is 60-65%. The LV Doppler derived stroke   volume equals 84.0 ccs.   The E/e'(lat) is 6, consistent with normal diastolic function.     Right Atrium: The right atrium is normal in size, measuring 4.0 cm in length in the apical view.     Right Ventricle: The right ventricle is normal in size. Global right ventricular systolic function appears normal. The estimated PA systolic pressure is 18 mmHg.     Aortic Valve:  Aortic valve is normal in structure with normal leaflet mobility.     Mitral Valve:  Mitral valve is normal in structure with normal leaflet mobility.     Tricuspid Valve:  Tricuspid valve is normal in structure with normal leaflet mobility.     Pulmonary Valve:  Pulmonary valve is normal in structure with normal leaflet mobility.     IVC: IVC is normal in size and collapses > 50% with a sniff, suggesting normal right atrial pressure of 3 mmHg.     Intracavitary: There is no evidence of pericardial effusion, intracavity mass, thrombi, or vegetation.         CONCLUSIONS     1 - Normal left ventricular systolic function (EF 60-65%).     2 - Normal right ventricular systolic function .     3 - The estimated PA systolic pressure is 18 mmHg.     4 - Grade 1 diastolic dysfunction .             This document has been electronically    SIGNED BY: Ridge Lantigua MD On: 09/16/2016 11:26    and Transthoracic echo (TTE) complete (Cupid Only):   Results for orders placed or performed during the hospital encounter of 10/27/19   Echo Color Flow Doppler? Yes   Result Value Ref Range    BSA 1.73 m2    Ascending aorta 2.61 cm    AV mean gradient 3 mmHg    Ao peak fela 1.23 m/s    Ao VTI 17.79 cm    IVS 1.07 0.6 - 1.1 cm    LA size 3.21 cm    Left Atrium Major  Axis 5.12 cm    Left Atrium Minor Axis 4.83 cm    LVIDD 4.17 3.5 - 6.0 cm    LVIDS 2.93 2.1 - 4.0 cm    LVOT diameter 1.96 cm    LVOT peak VTI 14.74 cm    PW 0.98 0.6 - 1.1 cm    PV Peak D Gennaro 0.24 m/s    PV Peak S Gennaro 0.29 m/s    RA Major Axis 3.96 cm    RA Width 2.15 cm    RVDD 1.57 cm    TR Max Gennaro 2.06 m/s    LA WIDTH 2.65 cm    Ao root annulus 3.38 cm    AORTIC VALVE CUSP SEPERATION 1.48 cm    LV Diastolic Volume 77.15 mL    LV Systolic Volume 33.07 mL    LVOT peak gennaro 0.80 m/s    FS 30 %    LA volume 35.94 cm3    LV mass 140.48 g    Left Ventricle Relative Wall Thickness 0.47 cm    AV valve area 2.50 cm2    AV Velocity Ratio 0.65     AV index (prosthetic) 0.83     Pulm vein S/D ratio 1.21     LVOT area 3.0 cm2    LVOT stroke volume 44.45 cm3    AV peak gradient 6 mmHg    LV Systolic Volume Index 19.7 mL/m2    LV Diastolic Volume Index 45.94 mL/m2    LA Volume Index 21.4 mL/m2    LV Mass Index 84 g/m2    Triscuspid Valve Regurgitation Peak Gradient 17 mmHg    TDI SEPTAL 0.09 m/s    TDI LATERAL 0.16 m/s    Mean e' 0.13 m/s    Narrative    · Normal left ventricular systolic function. The estimated ejection   fraction is 55%  · No wall motion abnormalities.  · Concentric left ventricular remodeling.  · Normal right ventricular systolic function.  · Mild mitral regurgitation.  · Diastolic pattern consistent with atrial fibrillation observed.        Assessment and Plan:       Active Diagnoses:    Diagnosis Date Noted POA    PRINCIPAL PROBLEM:  Acute deep vein thrombosis (DVT) of femoral vein of left lower extremity [I82.412] 01/30/2020 Yes    Urinary retention [R33.9] 11/04/2019 Yes    Slow transit constipation [K59.01] 10/28/2019 Yes     Chronic    Hypertension [I10] 08/29/2019 Yes    Acquired hypothyroidism [E03.9] 08/18/2016 Yes     Chronic    Epilepsy [G40.909] 08/18/2016 Yes     Chronic      Problems Resolved During this Admission:     1. Left CFA - saphenous vein DVT  - now on heparin gtt  - drop in hb  to 8, continue to trend  - may not be a great candidate for TPA  - will plan for repeat u/s of L leg  veins on Monday, possible thrombolysis will be reconsidered at that time.      VTE Risk Mitigation (From admission, onward)         Ordered     heparin 25,000 units in dextrose 5% 250 mL (100 units/mL) infusion HIGH INTENSITY nomogram - [facility]  Continuous     Question:  Heparin Infusion Adjustment (DO NOT MODIFY ANSWER)  Answer:  [email or web address]\epic\Images\Pharmacy\HeparinInfusions\heparin HIGH INTENSITY nomogram for [facility] [id number].pdf    01/31/20 1741     heparin 25,000 units in dextrose 5% (100 units/ml) IV bolus from bag - ADDITIONAL PRN BOLUS - 60 units/kg  As needed (PRN)     Question:  Heparin Infusion Adjustment (DO NOT MODIFY ANSWER)  Answer:  [email or web address]\epic\Images\Pharmacy\HeparinInfusions\heparin HIGH INTENSITY nomogram for [facility] [id number].pdf    01/31/20 1741     heparin 25,000 units in dextrose 5% (100 units/ml) IV bolus from bag - ADDITIONAL PRN BOLUS - 30 units/kg  As needed (PRN)     Question:  Heparin Infusion Adjustment (DO NOT MODIFY ANSWER)  Answer:  [email or web address]\epic\Images\Pharmacy\HeparinInfusions\heparin HIGH INTENSITY nomogram for [facility] [id number].pdf    01/31/20 1741                Richie Araujo MD  Cardiology  [facility]

## 2020-02-02 NOTE — NURSING
Permission attained to enter room via virtual system.  Virtual rounds completed as documented.  Today's lab values, notes, and vital signs up to now have been reviewed. Pt is aware she may have a procedure tomorrow  Time of procedure not yet visible in electronic records.  Will inform pt when it becomes available   Pt aware not to get out of bed unassisted   Call light in reach

## 2020-02-03 PROBLEM — R55 SYNCOPE: Status: RESOLVED | Noted: 2019-11-20 | Resolved: 2020-02-03

## 2020-02-03 PROBLEM — B96.20 E COLI BACTEREMIA: Status: RESOLVED | Noted: 2019-10-29 | Resolved: 2020-02-03

## 2020-02-03 PROBLEM — Z71.89 COUNSELING REGARDING ADVANCE CARE PLANNING AND GOALS OF CARE: Status: RESOLVED | Noted: 2019-11-22 | Resolved: 2020-02-03

## 2020-02-03 PROBLEM — R78.81 E COLI BACTEREMIA: Status: RESOLVED | Noted: 2019-10-29 | Resolved: 2020-02-03

## 2020-02-03 PROBLEM — K55.049: Status: RESOLVED | Noted: 2019-10-27 | Resolved: 2020-02-03

## 2020-02-03 PROBLEM — Z51.5 PALLIATIVE CARE ENCOUNTER: Status: RESOLVED | Noted: 2019-11-22 | Resolved: 2020-02-03

## 2020-02-03 PROBLEM — Z71.89 GOALS OF CARE, COUNSELING/DISCUSSION: Status: RESOLVED | Noted: 2019-11-22 | Resolved: 2020-02-03

## 2020-02-03 PROBLEM — I48.0 PAROXYSMAL ATRIAL FIBRILLATION: Chronic | Status: ACTIVE | Noted: 2019-12-28

## 2020-02-03 LAB
ANION GAP SERPL CALC-SCNC: 6 MMOL/L (ref 8–16)
APTT BLDCRRT: 41.6 SEC (ref 21–32)
BASOPHILS # BLD AUTO: 0.01 K/UL (ref 0–0.2)
BASOPHILS NFR BLD: 0.2 % (ref 0–1.9)
BUN SERPL-MCNC: 10 MG/DL (ref 8–23)
CALCIUM SERPL-MCNC: 8.7 MG/DL (ref 8.7–10.5)
CHLORIDE SERPL-SCNC: 97 MMOL/L (ref 95–110)
CO2 SERPL-SCNC: 28 MMOL/L (ref 23–29)
CREAT SERPL-MCNC: 0.7 MG/DL (ref 0.5–1.4)
DIFFERENTIAL METHOD: ABNORMAL
EOSINOPHIL # BLD AUTO: 0.2 K/UL (ref 0–0.5)
EOSINOPHIL NFR BLD: 5.3 % (ref 0–8)
ERYTHROCYTE [DISTWIDTH] IN BLOOD BY AUTOMATED COUNT: 14.6 % (ref 11.5–14.5)
EST. GFR  (AFRICAN AMERICAN): >60 ML/MIN/1.73 M^2
EST. GFR  (NON AFRICAN AMERICAN): >60 ML/MIN/1.73 M^2
GLUCOSE SERPL-MCNC: 124 MG/DL (ref 70–110)
HCT VFR BLD AUTO: 27.9 % (ref 37–48.5)
HGB BLD-MCNC: 9 G/DL (ref 12–16)
LYMPHOCYTES # BLD AUTO: 2 K/UL (ref 1–4.8)
LYMPHOCYTES NFR BLD: 43.1 % (ref 18–48)
MAGNESIUM SERPL-MCNC: 1.8 MG/DL (ref 1.6–2.6)
MCH RBC QN AUTO: 33.1 PG (ref 27–31)
MCHC RBC AUTO-ENTMCNC: 32.3 G/DL (ref 32–36)
MCV RBC AUTO: 103 FL (ref 82–98)
MONOCYTES # BLD AUTO: 0.4 K/UL (ref 0.3–1)
MONOCYTES NFR BLD: 8.8 % (ref 4–15)
NEUTROPHILS # BLD AUTO: 2 K/UL (ref 1.8–7.7)
NEUTROPHILS NFR BLD: 42.6 % (ref 38–73)
PHOSPHATE SERPL-MCNC: 2.1 MG/DL (ref 2.7–4.5)
PLATELET # BLD AUTO: 228 K/UL (ref 150–350)
PMV BLD AUTO: 9.2 FL (ref 9.2–12.9)
POC ACTIVATED CLOTTING TIME K: 230 SEC (ref 74–137)
POC ACTIVATED CLOTTING TIME K: 257 SEC (ref 74–137)
POCT GLUCOSE: 117 MG/DL (ref 70–110)
POTASSIUM SERPL-SCNC: 4.1 MMOL/L (ref 3.5–5.1)
RBC # BLD AUTO: 2.72 M/UL (ref 4–5.4)
SAMPLE: ABNORMAL
SAMPLE: ABNORMAL
SODIUM SERPL-SCNC: 131 MMOL/L (ref 136–145)
WBC # BLD AUTO: 4.57 K/UL (ref 3.9–12.7)

## 2020-02-03 PROCEDURE — 27201423 OPTIME MED/SURG SUP & DEVICES STERILE SUPPLY: Performed by: INTERNAL MEDICINE

## 2020-02-03 PROCEDURE — 85347 COAGULATION TIME ACTIVATED: CPT | Performed by: INTERNAL MEDICINE

## 2020-02-03 PROCEDURE — C1753 CATH, INTRAVAS ULTRASOUND: HCPCS | Performed by: INTERNAL MEDICINE

## 2020-02-03 PROCEDURE — 11000001 HC ACUTE MED/SURG PRIVATE ROOM

## 2020-02-03 PROCEDURE — 37248 TRLUML BALO ANGIOP 1ST VEIN: CPT | Mod: 59,51,LT | Performed by: INTERNAL MEDICINE

## 2020-02-03 PROCEDURE — 37252 INTRVASC US NONCORONARY 1ST: CPT | Mod: LT | Performed by: INTERNAL MEDICINE

## 2020-02-03 PROCEDURE — 36012 PLACE CATHETER IN VEIN: CPT | Mod: RT | Performed by: INTERNAL MEDICINE

## 2020-02-03 PROCEDURE — 25000003 PHARM REV CODE 250: Performed by: NURSE PRACTITIONER

## 2020-02-03 PROCEDURE — 37252 PR IVUS, NON-CORONARY, 1ST VESSEL: ICD-10-PCS | Mod: LT,,, | Performed by: INTERNAL MEDICINE

## 2020-02-03 PROCEDURE — 37187 PR THROMBECTOMY, VENOUS: ICD-10-PCS | Mod: LT,,, | Performed by: INTERNAL MEDICINE

## 2020-02-03 PROCEDURE — 99152 PR MOD CONSCIOUS SEDATION, SAME PHYS, 5+ YRS, FIRST 15 MIN: ICD-10-PCS | Mod: ,,, | Performed by: INTERNAL MEDICINE

## 2020-02-03 PROCEDURE — 37252 INTRVASC US NONCORONARY 1ST: CPT | Mod: LT,,, | Performed by: INTERNAL MEDICINE

## 2020-02-03 PROCEDURE — 80048 BASIC METABOLIC PNL TOTAL CA: CPT

## 2020-02-03 PROCEDURE — 37187 VENOUS MECH THROMBECTOMY: CPT | Mod: LT,,, | Performed by: INTERNAL MEDICINE

## 2020-02-03 PROCEDURE — 37238 OPEN/PERQ PLACE STENT SAME: CPT | Mod: LT | Performed by: INTERNAL MEDICINE

## 2020-02-03 PROCEDURE — 99153 MOD SED SAME PHYS/QHP EA: CPT | Performed by: INTERNAL MEDICINE

## 2020-02-03 PROCEDURE — 37187 VENOUS MECH THROMBECTOMY: CPT | Mod: LT | Performed by: INTERNAL MEDICINE

## 2020-02-03 PROCEDURE — 99152 MOD SED SAME PHYS/QHP 5/>YRS: CPT | Performed by: INTERNAL MEDICINE

## 2020-02-03 PROCEDURE — 25000003 PHARM REV CODE 250: Performed by: INTERNAL MEDICINE

## 2020-02-03 PROCEDURE — 99152 MOD SED SAME PHYS/QHP 5/>YRS: CPT | Mod: ,,, | Performed by: INTERNAL MEDICINE

## 2020-02-03 PROCEDURE — 36415 COLL VENOUS BLD VENIPUNCTURE: CPT

## 2020-02-03 PROCEDURE — C1760 CLOSURE DEV, VASC: HCPCS | Performed by: INTERNAL MEDICINE

## 2020-02-03 PROCEDURE — 37248 TRLUML BALO ANGIOP 1ST VEIN: CPT | Mod: 59,51,LT, | Performed by: INTERNAL MEDICINE

## 2020-02-03 PROCEDURE — 25500020 PHARM REV CODE 255: Performed by: INTERNAL MEDICINE

## 2020-02-03 PROCEDURE — 36012 PR PLACE CATH IN VEIN,SUBSELECT: ICD-10-PCS | Mod: RT,,, | Performed by: INTERNAL MEDICINE

## 2020-02-03 PROCEDURE — 63600175 PHARM REV CODE 636 W HCPCS: Performed by: NURSE PRACTITIONER

## 2020-02-03 PROCEDURE — 36012 PLACE CATHETER IN VEIN: CPT | Mod: RT,,, | Performed by: INTERNAL MEDICINE

## 2020-02-03 PROCEDURE — 85730 THROMBOPLASTIN TIME PARTIAL: CPT

## 2020-02-03 PROCEDURE — C1757 CATH, THROMBECTOMY/EMBOLECT: HCPCS | Performed by: INTERNAL MEDICINE

## 2020-02-03 PROCEDURE — 37238 PR OPEN/PERQ TRANSCATH PLACE STENT SAME VESSEL; INITIAL VEIN: ICD-10-PCS | Mod: LT,,, | Performed by: INTERNAL MEDICINE

## 2020-02-03 PROCEDURE — 37238 OPEN/PERQ PLACE STENT SAME: CPT | Mod: LT,,, | Performed by: INTERNAL MEDICINE

## 2020-02-03 PROCEDURE — 94761 N-INVAS EAR/PLS OXIMETRY MLT: CPT

## 2020-02-03 PROCEDURE — 85025 COMPLETE CBC W/AUTO DIFF WBC: CPT

## 2020-02-03 PROCEDURE — C1876 STENT, NON-COA/NON-COV W/DEL: HCPCS | Performed by: INTERNAL MEDICINE

## 2020-02-03 PROCEDURE — 84100 ASSAY OF PHOSPHORUS: CPT

## 2020-02-03 PROCEDURE — C1769 GUIDE WIRE: HCPCS | Performed by: INTERNAL MEDICINE

## 2020-02-03 PROCEDURE — 25000003 PHARM REV CODE 250: Performed by: FAMILY MEDICINE

## 2020-02-03 PROCEDURE — 83735 ASSAY OF MAGNESIUM: CPT

## 2020-02-03 PROCEDURE — C1887 CATHETER, GUIDING: HCPCS | Performed by: INTERNAL MEDICINE

## 2020-02-03 PROCEDURE — 63600175 PHARM REV CODE 636 W HCPCS: Performed by: INTERNAL MEDICINE

## 2020-02-03 PROCEDURE — 37248 PR TRANSLML BALLOON ANGIO, OPEN/PERC, INITIAL VEIN: ICD-10-PCS | Mod: 59,51,LT, | Performed by: INTERNAL MEDICINE

## 2020-02-03 PROCEDURE — C1725 CATH, TRANSLUMIN NON-LASER: HCPCS | Performed by: INTERNAL MEDICINE

## 2020-02-03 PROCEDURE — C1894 INTRO/SHEATH, NON-LASER: HCPCS | Performed by: INTERNAL MEDICINE

## 2020-02-03 DEVICE — SELF-EXPANDING STENT
Type: IMPLANTABLE DEVICE | Site: LEG | Status: FUNCTIONAL
Brand: WALLSTENT® ENDOPROSTHESIS

## 2020-02-03 RX ORDER — HEPARIN SODIUM 200 [USP'U]/100ML
INJECTION, SOLUTION INTRAVENOUS
Status: DISCONTINUED | OUTPATIENT
Start: 2020-02-03 | End: 2020-02-04 | Stop reason: HOSPADM

## 2020-02-03 RX ORDER — IODIXANOL 320 MG/ML
INJECTION, SOLUTION INTRAVASCULAR
Status: DISCONTINUED | OUTPATIENT
Start: 2020-02-03 | End: 2020-02-03 | Stop reason: HOSPADM

## 2020-02-03 RX ORDER — HEPARIN SODIUM 1000 [USP'U]/ML
INJECTION, SOLUTION INTRAVENOUS; SUBCUTANEOUS
Status: DISCONTINUED | OUTPATIENT
Start: 2020-02-03 | End: 2020-02-03 | Stop reason: HOSPADM

## 2020-02-03 RX ORDER — FENTANYL CITRATE 50 UG/ML
INJECTION, SOLUTION INTRAMUSCULAR; INTRAVENOUS
Status: DISCONTINUED | OUTPATIENT
Start: 2020-02-03 | End: 2020-02-03 | Stop reason: HOSPADM

## 2020-02-03 RX ORDER — LISINOPRIL 20 MG/1
20 TABLET ORAL DAILY
Status: DISCONTINUED | OUTPATIENT
Start: 2020-02-03 | End: 2020-02-03

## 2020-02-03 RX ORDER — DIPHENHYDRAMINE HYDROCHLORIDE 50 MG/ML
INJECTION INTRAMUSCULAR; INTRAVENOUS
Status: DISCONTINUED | OUTPATIENT
Start: 2020-02-03 | End: 2020-02-03 | Stop reason: HOSPADM

## 2020-02-03 RX ORDER — LISINOPRIL 20 MG/1
20 TABLET ORAL DAILY
Status: DISCONTINUED | OUTPATIENT
Start: 2020-02-04 | End: 2020-02-04

## 2020-02-03 RX ORDER — LIDOCAINE HYDROCHLORIDE 10 MG/ML
INJECTION INFILTRATION; PERINEURAL
Status: DISCONTINUED | OUTPATIENT
Start: 2020-02-03 | End: 2020-02-03 | Stop reason: HOSPADM

## 2020-02-03 RX ORDER — MIDAZOLAM HYDROCHLORIDE 1 MG/ML
INJECTION, SOLUTION INTRAMUSCULAR; INTRAVENOUS
Status: DISCONTINUED | OUTPATIENT
Start: 2020-02-03 | End: 2020-02-03 | Stop reason: HOSPADM

## 2020-02-03 RX ADMIN — LEVOTHYROXINE SODIUM 125 MCG: 75 TABLET ORAL at 05:02

## 2020-02-03 RX ADMIN — APIXABAN 10 MG: 5 TABLET, FILM COATED ORAL at 06:02

## 2020-02-03 RX ADMIN — ACETAMINOPHEN 650 MG: 325 TABLET ORAL at 06:02

## 2020-02-03 RX ADMIN — PRAVASTATIN SODIUM 20 MG: 10 TABLET ORAL at 06:02

## 2020-02-03 RX ADMIN — PHENOBARBITAL 64.8 MG: 32.4 TABLET ORAL at 09:02

## 2020-02-03 RX ADMIN — SODIUM CHLORIDE 500 ML: 0.9 INJECTION, SOLUTION INTRAVENOUS at 09:02

## 2020-02-03 RX ADMIN — OXCARBAZEPINE 300 MG: 150 TABLET, FILM COATED ORAL at 09:02

## 2020-02-03 RX ADMIN — LISINOPRIL 20 MG: 20 TABLET ORAL at 06:02

## 2020-02-03 RX ADMIN — CLONIDINE HYDROCHLORIDE 0.2 MG: 0.1 TABLET ORAL at 06:02

## 2020-02-03 NOTE — PROCEDURES
: Timmy Simmons MD  Pre-procedure diagnosis: Iliofemoral DVT with post phlebitic syndrome  Post-procedure diagnosis: DVT    Post Procedure Note: Peripheral mechanical thrombectomy with stent placement    The pt was brought to the cath lab and under sterile technique, left popliteal access was obtained without difficulty in prone position. Images were obtained and extensive iliofemoral thrombus noted with compression noted in the CIV and confirmed with IVUS. Successful clot retrieval with Inari device and stent placement in CIV/EIV with excellent results. Please see full report for details. The pt tolerated the procedure well without complications. Perclose closure device used with successful hemostasis.     Vitals:    02/03/20 0509 02/03/20 0700 02/03/20 0806 02/03/20 1420   BP: (!) 150/63  138/65 (!) 162/70   BP Location: Left arm  Left arm    Patient Position: Lying  Lying Lying   Pulse: 69 73 77 83   Resp: 16  18 16   Temp: 96.8 °F (36 °C)  98 °F (36.7 °C) 98.9 °F (37.2 °C)   TempSrc: Oral  Oral Oral   SpO2:   98% (!) 94%   Weight:       Height:             Gen: NAD  Ext: 2+ fem pulses, no evidence of hematoma  Estimated blood loss: < 50 cc    Plan:  -Post cath care per protocol  -DOAC

## 2020-02-03 NOTE — PROGRESS NOTES
Pt in cath lab area for procedure, upon arrival to our area, Right heel noted red, slightly blanchable. Foam dressing applied to area, will report to receiving telemetry RN once procedure and procedure recovery completed.

## 2020-02-03 NOTE — PROGRESS NOTES
[facility] Hospital Medicine  Progress Note    Patient Name: Annette Garcia  MRN: [id number]  Patient Class: IP- Inpatient   Admission Date: 1/30/2020  Length of Stay: 4 days  Attending Physician: Alice Barker*  Primary Care Provider: Jose Valles MD        Subjective:     Principal Problem:Acute deep vein thrombosis (DVT) of femoral vein of left lower extremity        HPI:  84 y.o. female with history of HTN, hypothyroidism, epilepsy, atrial fibrillation, allergy, colon cancer and scoliosis of lumbar spine who presents to the ED complaining of left leg swelling. She was seen this morning by her cardiologist, Dr. Medina, to receive results of Holter monitor test.  During her exam, cardiologist noticed leg swelling and referred patient to ED for further evaluation.  She denies leg pain, chest pain, lightheadedness, shortness of breath.  Denies taking blood thinners currently.  ED findings: Ultrasound showed thrombosis within the left common femoral, femoral, greater saphenous, and popliteal veins, H/H 9.6/29.6, Na 131.  Patient admitted to [facility] for further medical management and possible cardiology intervention.    Of note, patient currently resides at [facility].  She is wheelchair bound. Denies having any leg pain, CP, lightheadedness, or SOB. Not taking any blood thinners currently. Pt lives in a nursing home.    Overview/Hospital Course:  Admitted with left CFA, saphenous and pop DVT started on weight based Lovenox and switched to heparin. Cardiology planned for possible aspiration thrombectomy +/- tpa infusion and likely start OAC over the weekend.  2/3 repeat doppler and for possible EKOS today     Interval History: awake and alert, leg swelling improved, color discoloration resolved.   repeat doppler on Monday, and Possible EKOS today.      Review of Systems   Constitutional: Negative for chills and fever.   Respiratory: Negative for cough, chest tightness and  shortness of breath.    Cardiovascular: Positive for leg swelling (left lower extremity). Negative for chest pain.   Gastrointestinal: Negative for abdominal pain.   Musculoskeletal: Positive for arthralgias and joint swelling.   Skin: Positive for pallor. Negative for color change.   Psychiatric/Behavioral: Negative for agitation.     Objective:     Vital Signs (Most Recent):  Temp: 97.2 °F (36.2 °C) (02/03/20 0806)  Pulse: 77 (02/03/20 0806)  Resp: 20 (02/03/20 0806)  BP: (!) 184/77 (02/03/20 0806)  SpO2: 98 % (02/03/20 0806) Vital Signs (24h Range):  Temp:  [96.3 °F (35.7 °C)-99.1 °F (37.3 °C)] 97.2 °F (36.2 °C)  Pulse:  [63-78] 77  Resp:  [16-20] 20  SpO2:  [94 %-98 %] 98 %  BP: (124-184)/(58-77) 184/77     Weight: 84.4 kg (186 lb 1.1 oz)  Body mass index is 40.26 kg/m².    Intake/Output Summary (Last 24 hours) at 2/3/2020 0850  Last data filed at 2/3/2020 0600  Gross per 24 hour   Intake 275 ml   Output 1600 ml   Net -1325 ml      Physical Exam   Constitutional: She is oriented to person, place, and time. She appears well-developed and well-nourished.   HENT:   Head: Normocephalic and atraumatic.   Neck: Neck supple.   Cardiovascular: Normal rate.   Pulmonary/Chest: Effort normal and breath sounds normal.   Abdominal: Soft. Bowel sounds are normal.   Musculoskeletal: She exhibits edema (left lower extremity). She exhibits no deformity.   Neurological: She is alert and oriented to person, place, and time.   Skin: Skin is warm and dry.   Psychiatric: She has a normal mood and affect.       Significant Labs:   CBC:   Recent Labs   Lab 02/02/20  0139 02/03/20  0635   WBC 5.07 4.57   HGB 7.9* 9.0*   HCT 24.2* 27.9*    228     CMP:   No results for input(s): NA, K, CL, CO2, GLU, BUN, CREATININE, CALCIUM, PROT, ALBUMIN, BILITOT, ALKPHOS, AST, ALT, ANIONGAP, EGFRNONAA in the last 48 hours.    Invalid input(s): ESTGFAFRICA  Cardiac Markers: No results for input(s): CKMB, MYOGLOBIN, BNP, TROPISTAT in the last 48  hours.  Coagulation:   Recent Labs   Lab 02/03/20  0635   APTT 41.6*     Troponin: No results for input(s): TROPONINI in the last 48 hours.  TSH:   Recent Labs   Lab 11/01/19  1213   TSH 0.507     Urine Culture: No results for input(s): LABURIN in the last 48 hours.    Significant Imaging: I have reviewed all pertinent imaging results/findings within the past 24 hours.      Assessment/Plan:      * Acute deep vein thrombosis (DVT) of femoral vein of left lower extremity  Therapeutic Lovenox q 12hr  Continuous cardiac monitoring  Consult cardiology: planned for possible aspiration thrombectomy +/- tpa infusion and likely start OAC over the weekend. left CFA, saphenous and pop  Routine vital signs      Paroxysmal atrial fibrillation        Urinary retention  Patient had gonzales in place on admission 2/2 urinary retention, she follows with urology as outpatient.  Gonzales care  Continue to gonzales urology as outpatient      Slow transit constipation  Continue miralax      Hypertension  Continue clonidine and Lisinopril  Hydralazine prn      Acquired hypothyroidism  Continue synthroid      Epilepsy  Stable  Continue trileptal and phenobarbital   Seizure precautions        VTE Risk Mitigation (From admission, onward)         Ordered     heparin 25,000 units in dextrose 5% 250 mL (100 units/mL) infusion HIGH INTENSITY nomogram - [facility]  Continuous     Question:  Heparin Infusion Adjustment (DO NOT MODIFY ANSWER)  Answer:  \\[email or web address]\epic\Images\Pharmacy\HeparinInfusions\heparin HIGH INTENSITY nomogram for [facility] [id number]    01/31/20 1741     heparin 25,000 units in dextrose 5% (100 units/ml) IV bolus from bag - ADDITIONAL PRN BOLUS - 60 units/kg  As needed (PRN)     Question:  Heparin Infusion Adjustment (DO NOT MODIFY ANSWER)  Answer:  \\[email or web address]\epic\Images\Pharmacy\HeparinInfusions\heparin HIGH INTENSITY nomogram for [facility] [id number]    01/31/20 1741     heparin 25,000 units in dextrose 5% (100 units/ml) IV bolus from bag -  ADDITIONAL PRN BOLUS - 30 units/kg  As needed (PRN)     Question:  Heparin Infusion Adjustment (DO NOT MODIFY ANSWER)  Answer:  \\[email or web address]\epic\Images\Pharmacy\HeparinInfusions\heparin HIGH INTENSITY nomogram for [facility] [id number].pdf    01/31/20 174                      Alice Barker MD  Department of Hospital Medicine   [facility]

## 2020-02-03 NOTE — SUBJECTIVE & OBJECTIVE
Interval History: awake and alert, leg swelling improved, color discoloration resolved.   repeat doppler on Monday, and Possible EKOS today.      Review of Systems   Constitutional: Negative for chills and fever.   Respiratory: Negative for cough, chest tightness and shortness of breath.    Cardiovascular: Positive for leg swelling (left lower extremity). Negative for chest pain.   Gastrointestinal: Negative for abdominal pain.   Musculoskeletal: Positive for arthralgias and joint swelling.   Skin: Positive for pallor. Negative for color change.   Psychiatric/Behavioral: Negative for agitation.     Objective:     Vital Signs (Most Recent):  Temp: 97.2 °F (36.2 °C) (02/03/20 0806)  Pulse: 77 (02/03/20 0806)  Resp: 20 (02/03/20 0806)  BP: (!) 184/77 (02/03/20 0806)  SpO2: 98 % (02/03/20 0806) Vital Signs (24h Range):  Temp:  [96.3 °F (35.7 °C)-99.1 °F (37.3 °C)] 97.2 °F (36.2 °C)  Pulse:  [63-78] 77  Resp:  [16-20] 20  SpO2:  [94 %-98 %] 98 %  BP: (124-184)/(58-77) 184/77     Weight: 84.4 kg (186 lb 1.1 oz)  Body mass index is 40.26 kg/m².    Intake/Output Summary (Last 24 hours) at 2/3/2020 0850  Last data filed at 2/3/2020 0600  Gross per 24 hour   Intake 275 ml   Output 1600 ml   Net -1325 ml      Physical Exam   Constitutional: She is oriented to person, place, and time. She appears well-developed and well-nourished.   HENT:   Head: Normocephalic and atraumatic.   Neck: Neck supple.   Cardiovascular: Normal rate.   Pulmonary/Chest: Effort normal and breath sounds normal.   Abdominal: Soft. Bowel sounds are normal.   Musculoskeletal: She exhibits edema (left lower extremity). She exhibits no deformity.   Neurological: She is alert and oriented to person, place, and time.   Skin: Skin is warm and dry.   Psychiatric: She has a normal mood and affect.       Significant Labs:   CBC:   Recent Labs   Lab 02/02/20  0139 02/03/20  0635   WBC 5.07 4.57   HGB 7.9* 9.0*   HCT 24.2* 27.9*    228     CMP:   No results  for input(s): NA, K, CL, CO2, GLU, BUN, CREATININE, CALCIUM, PROT, ALBUMIN, BILITOT, ALKPHOS, AST, ALT, ANIONGAP, EGFRNONAA in the last 48 hours.    Invalid input(s): ESTGFAFRICA  Cardiac Markers: No results for input(s): CKMB, MYOGLOBIN, BNP, TROPISTAT in the last 48 hours.  Coagulation:   Recent Labs   Lab 02/03/20  0635   APTT 41.6*     Troponin: No results for input(s): TROPONINI in the last 48 hours.  TSH:   Recent Labs   Lab 11/01/19  1213   TSH 0.507     Urine Culture: No results for input(s): LABURIN in the last 48 hours.    Significant Imaging: I have reviewed all pertinent imaging results/findings within the past 24 hours.

## 2020-02-03 NOTE — PLAN OF CARE
VN note: VN cued into patient's room for introduction. Patient off floor at this time. No family at bedside. Progress notes reviewed. VN will continue to be available to patient and intervene prn.

## 2020-02-03 NOTE — PLAN OF CARE
POC reviewed with the pt, verbalized understanding.  VSS.  AAOx3.  Complaint of mild headache, prn medication given.  No other distress or any other complaint of pain throughout night.  PIV and port-a-cath remained intact.  Infusing continuous heparin @ 6units/kg/hr, 5.1mL/hr, aPTT will be monitored in am blood lab.  On continuous telemetry monitor, SR.  No ectopy noted.  Left leg elevated on pillow, activity not encouraged due to DVT.  Pt is NPO after midnight for EKOS, cleansed with chlorhexidine wipes, new gown applied.  Safety maintained, free of falls throughout shift.  Instructed to call for any assistance.  Will continue to monitor.

## 2020-02-03 NOTE — PLAN OF CARE
Pt. Arrived into Cath lab recovery, in a drowsy state easily aroused to speech.  Pt. Denied any present discomforts.  Noted left popliteal drsg. C/D/I.  Right heel with mild redness, applied bilateral foam drsgs. To heels.  Bilateral heels elevated.  Left leg with +2 edema. Martinez to gravity with noted clear yellow urine.  Will monitor pt.

## 2020-02-03 NOTE — NURSING
Pt back from cath lab with family at bedside. HOB at 30 degrees. Dry dressing noted behind left popliteal area. Continues to have edema to BLE. Complained of pain to VIC heels. Applied boots for skin breakdown prevention.Vital signs taken. Noted 1200 cc output in catheter.Some cloudiness noted to urine. IV fluid started @253 cc/Hr of NS. Call light within reach.

## 2020-02-04 VITALS
HEART RATE: 90 BPM | RESPIRATION RATE: 20 BRPM | DIASTOLIC BLOOD PRESSURE: 65 MMHG | SYSTOLIC BLOOD PRESSURE: 145 MMHG | BODY MASS INDEX: 40.14 KG/M2 | OXYGEN SATURATION: 97 % | TEMPERATURE: 99 F | WEIGHT: 186.06 LBS | HEIGHT: 57 IN

## 2020-02-04 PROBLEM — R33.9 URINARY RETENTION: Chronic | Status: ACTIVE | Noted: 2019-11-04

## 2020-02-04 LAB
APTT BLDCRRT: 39 SEC (ref 21–32)
BASOPHILS # BLD AUTO: 0.01 K/UL (ref 0–0.2)
BASOPHILS NFR BLD: 0.2 % (ref 0–1.9)
DIFFERENTIAL METHOD: ABNORMAL
EOSINOPHIL # BLD AUTO: 0.1 K/UL (ref 0–0.5)
EOSINOPHIL NFR BLD: 1.3 % (ref 0–8)
ERYTHROCYTE [DISTWIDTH] IN BLOOD BY AUTOMATED COUNT: 14.6 % (ref 11.5–14.5)
HCT VFR BLD AUTO: 24.7 % (ref 37–48.5)
HGB BLD-MCNC: 8 G/DL (ref 12–16)
LYMPHOCYTES # BLD AUTO: 1.9 K/UL (ref 1–4.8)
LYMPHOCYTES NFR BLD: 32.3 % (ref 18–48)
MAGNESIUM SERPL-MCNC: 1.6 MG/DL (ref 1.6–2.6)
MCH RBC QN AUTO: 33.5 PG (ref 27–31)
MCHC RBC AUTO-ENTMCNC: 32.4 G/DL (ref 32–36)
MCV RBC AUTO: 103 FL (ref 82–98)
MONOCYTES # BLD AUTO: 0.6 K/UL (ref 0.3–1)
MONOCYTES NFR BLD: 9.5 % (ref 4–15)
NEUTROPHILS # BLD AUTO: 3.4 K/UL (ref 1.8–7.7)
NEUTROPHILS NFR BLD: 56.7 % (ref 38–73)
PHOSPHATE SERPL-MCNC: 2.1 MG/DL (ref 2.7–4.5)
PLATELET # BLD AUTO: 201 K/UL (ref 150–350)
PMV BLD AUTO: 8.8 FL (ref 9.2–12.9)
POCT GLUCOSE: 133 MG/DL (ref 70–110)
RBC # BLD AUTO: 2.39 M/UL (ref 4–5.4)
WBC # BLD AUTO: 6.01 K/UL (ref 3.9–12.7)

## 2020-02-04 PROCEDURE — 99900038 HC OT GENERIC THERAPY SCREENING (STAT)

## 2020-02-04 PROCEDURE — 83735 ASSAY OF MAGNESIUM: CPT

## 2020-02-04 PROCEDURE — 25000003 PHARM REV CODE 250: Performed by: NURSE PRACTITIONER

## 2020-02-04 PROCEDURE — 94761 N-INVAS EAR/PLS OXIMETRY MLT: CPT

## 2020-02-04 PROCEDURE — 63600175 PHARM REV CODE 636 W HCPCS: Performed by: HOSPITALIST

## 2020-02-04 PROCEDURE — 36415 COLL VENOUS BLD VENIPUNCTURE: CPT

## 2020-02-04 PROCEDURE — 85730 THROMBOPLASTIN TIME PARTIAL: CPT

## 2020-02-04 PROCEDURE — 85025 COMPLETE CBC W/AUTO DIFF WBC: CPT

## 2020-02-04 PROCEDURE — 84100 ASSAY OF PHOSPHORUS: CPT

## 2020-02-04 RX ORDER — LISINOPRIL 5 MG/1
10 TABLET ORAL DAILY
Status: DISCONTINUED | OUTPATIENT
Start: 2020-02-04 | End: 2020-02-04 | Stop reason: HOSPADM

## 2020-02-04 RX ORDER — CLONIDINE HYDROCHLORIDE 0.2 MG/1
0.2 TABLET ORAL 2 TIMES DAILY
Qty: 180 TABLET | Refills: 3
Start: 2020-02-04 | End: 2020-02-04 | Stop reason: SDUPTHER

## 2020-02-04 RX ORDER — CLONIDINE HYDROCHLORIDE 0.2 MG/1
0.2 TABLET ORAL DAILY
Qty: 180 TABLET | Refills: 3 | Status: ON HOLD
Start: 2020-02-04 | End: 2020-08-14 | Stop reason: HOSPADM

## 2020-02-04 RX ORDER — LANOLIN ALCOHOL/MO/W.PET/CERES
400 CREAM (GRAM) TOPICAL 2 TIMES DAILY
Refills: 0 | Status: ON HOLD | COMMUNITY
Start: 2020-02-04 | End: 2020-08-14 | Stop reason: HOSPADM

## 2020-02-04 RX ORDER — HEPARIN 100 UNIT/ML
500 SYRINGE INTRAVENOUS ONCE
Status: COMPLETED | OUTPATIENT
Start: 2020-02-04 | End: 2020-02-04

## 2020-02-04 RX ORDER — POLYETHYLENE GLYCOL 3350 17 G/17G
17 POWDER, FOR SOLUTION ORAL 2 TIMES DAILY PRN
Refills: 0 | Status: ON HOLD
Start: 2020-02-04 | End: 2022-05-23 | Stop reason: HOSPADM

## 2020-02-04 RX ADMIN — PRAVASTATIN SODIUM 20 MG: 10 TABLET ORAL at 09:02

## 2020-02-04 RX ADMIN — APIXABAN 10 MG: 5 TABLET, FILM COATED ORAL at 09:02

## 2020-02-04 RX ADMIN — LEVOTHYROXINE SODIUM 125 MCG: 75 TABLET ORAL at 05:02

## 2020-02-04 RX ADMIN — ACETAMINOPHEN 650 MG: 325 TABLET ORAL at 05:02

## 2020-02-04 RX ADMIN — LISINOPRIL 10 MG: 5 TABLET ORAL at 09:02

## 2020-02-04 RX ADMIN — HEPARIN SODIUM (PORCINE) LOCK FLUSH IV SOLN 100 UNIT/ML 500 UNITS: 100 SOLUTION at 03:02

## 2020-02-04 NOTE — ASSESSMENT & PLAN NOTE
Thrombosis is visualized within the left common femoral, femoral, greater saphenous, and popliteal veins.  Provoked DVT in setting of immobility     Thrombectomy performed on 02/03 for persistent clot   Images were obtained and extensive iliofemoral thrombus noted with compression noted in the CIV and confirmed with IVUS. Successful clot retrieval with Inari device and stent placement in CIV/EIV with excellent results  Eliquis initiated and will need OAC for the forseeable future   Follow up as outpatient

## 2020-02-04 NOTE — PROGRESS NOTES
[facility]  Cardiology  Progress Note    Patient Name: Annette Garcia  MRN: [id number]  Admission Date: 1/30/2020  Hospital Length of Stay: 5 days  Code Status: Full Code   Attending Physician: José Manuel Guidry MD   Primary Care Physician: Jose Valles MD  Expected Discharge Date: 2/4/2020  Principal Problem:Acute deep vein thrombosis (DVT) of femoral vein of left lower extremity    Subjective:     Hospital Course:   01/31/2020 Per HPI   02/03/2020 NPO for thrombectomy,  Images were obtained and extensive iliofemoral thrombus noted with compression noted in the CIV and confirmed with IVUS. Successful clot retrieval with Inari device and stent placement in CIV/EIV with excellent results.  Eliquis initiated.  02/04/2020 Hypotensive overnight, asymptomatic. Normotensive this AM. Tolerating Eliquis. PT/OT ordered.     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    In the setting of septic shock and near death    Petit mal epilepsy 1954    Scoliosis of lumbar spine     Seizures     Unspecified hypothyroidism        Past Surgical History:   Procedure Laterality Date    APPENDECTOMY      BACK SURGERY  1988    vertebral fracture    BACK SURGERY  02/2013    lumbar L2-5    CATARACT EXTRACTION, BILATERAL Bilateral     CHOLECYSTECTOMY      open    EYE SURGERY Bilateral     cataract removal with lens implant    HYSTERECTOMY      PORTACATH PLACEMENT Right 09/2016    RENAL ARTERY STENT Left 07/19/2017    SIGMOIDECTOMY  10/29/2019    SMALL INTESTINE SURGERY  08/23/2016    TONSILLECTOMY      VAGINAL HYSTERECTOMY W/ ANTERIOR AND POSTERIOR VAGINAL REPAIR         Review of patient's allergies indicates:   Allergen Reactions    Adhesive Itching and Blisters    Penicillins Anaphylaxis    Tramadol Hives    Avelox [moxifloxacin] Rash      Facial and arm itching and redness. Pt states throat closes when given.    Amoxil [amoxicillin]     Aspridrox [aspirin, buffered]     Codeine Other (See Comments)     Throat swelling    Keflex [cephalexin]      Tolerated cefepime and cefazolin    Norvasc [amlodipine]     Red dye Hives    Robitussin [guaifenesin]     Sulfa (sulfonamide antibiotics)     Tylenol [acetaminophen]      Has reaction to Tylenol with red dye and unable to take Extra Strength Tylenol/ CAN ONLY TOLERATE REG STRENGTH TYLENOL    Vicks vaporub [camphor-eucalyptus oil-menthol]        No current facility-administered medications on file prior to encounter.      Current Outpatient Medications on File Prior to Encounter   Medication Sig    acetaminophen (TYLENOL) 325 MG tablet Take 2 tablets (650 mg total) by mouth every 4 (four) hours as needed for Pain.    ascorbic acid, vitamin C, (VITAMIN C) 500 MG tablet Take 500 mg by mouth once daily.    calcium carbonate (OS-RICHARDSON) 600 mg calcium (1,500 mg) Tab Take 0.5 tablets (300 mg total) by mouth once daily.    levothyroxine (SYNTHROID) 125 MCG tablet TAKE 1 TABLET (125 MCG TOTAL) BY MOUTH ONCE DAILY.    lisinopril (PRINIVIL,ZESTRIL) 40 MG tablet TAKE 1 TABLET BY MOUTH EVERY DAY    mirtazapine (REMERON) 7.5 MG Tab Take 1 tablet (7.5 mg total) by mouth every evening.    multivitamin (MULTIPLE VITAMINS DAILY ORAL) Take by mouth.    OXcarbazepine (TRILEPTAL) 300 MG Tab Take 1 tablet (300 mg total) by mouth nightly.    PHENobarbital (LUMINAL) 64.8 MG tablet Take 1 tablet (64.8 mg total) by mouth every evening.    pravastatin (PRAVACHOL) 20 MG tablet Take 1 tablet (20 mg total) by mouth once daily.    vitamin D (VITAMIN D3) 1000 units Tab Take 1,000 Units by mouth once daily.    [DISCONTINUED] cloNIDine (CATAPRES) 0.2 MG tablet TAKE 1 TABLET (0.2 MG TOTAL) BY MOUTH 2 (TWO) TIMES DAILY.    [DISCONTINUED] magnesium 250 mg Tab Take 1 tablet (250 mg total) by mouth once daily. (Patient taking  differently: Take 400 mg by mouth 2 (two) times daily. )    ondansetron (ZOFRAN) 4 MG tablet Take 1 tablet (4 mg total) by mouth every 8 (eight) hours as needed for Nausea.     Family History     Problem Relation (Age of Onset)    Heart attack Father    Hypertension Father    Pancreatic cancer Mother        Tobacco Use    Smoking status: Never Smoker    Smokeless tobacco: Never Used   Substance and Sexual Activity    Alcohol use: No    Drug use: No    Sexual activity: Not on file     Review of Systems   Constitution: Negative for diaphoresis.   HENT: Negative.    Eyes: Negative.    Cardiovascular: Positive for leg swelling. Negative for chest pain, irregular heartbeat, near-syncope, orthopnea, palpitations, paroxysmal nocturnal dyspnea and syncope.   Respiratory: Negative for cough, shortness of breath, sputum production and wheezing.    Endocrine: Negative.    Hematologic/Lymphatic: Negative for bleeding problem. Does not bruise/bleed easily.   Skin: Negative.    Musculoskeletal: Negative.    Gastrointestinal: Negative.    Genitourinary: Negative.    Neurological: Positive for weakness.   Psychiatric/Behavioral: Negative.    Allergic/Immunologic: Negative.      Objective:     Vital Signs (Most Recent):  Temp: 98.4 °F (36.9 °C) (02/04/20 0742)  Pulse: 78 (02/04/20 0742)  Resp: 20 (02/04/20 0742)  BP: (!) 114/58 (02/04/20 0742)  SpO2: 96 % (02/04/20 0822) Vital Signs (24h Range):  Temp:  [96.2 °F (35.7 °C)-98.9 °F (37.2 °C)] 98.4 °F (36.9 °C)  Pulse:  [68-97] 78  Resp:  [16-20] 20  SpO2:  [94 %-98 %] 96 %  BP: ()/(30-91) 114/58     Weight: 84.4 kg (186 lb 1.1 oz)  Body mass index is 40.26 kg/m².    SpO2: 96 %  O2 Device (Oxygen Therapy): room air      Intake/Output Summary (Last 24 hours) at 2/4/2020 1020  Last data filed at 2/4/2020 0510  Gross per 24 hour   Intake 75 ml   Output 800 ml   Net -725 ml       Lines/Drains/Airways     Central Venous Catheter Line                 Port A Cath Single Lumen  01/30/20 right atrial 5 days          Drain                 Colostomy 10/29/19 1200 LLQ 97 days         Urethral Catheter 12/02/19 64 days          Peripheral Intravenous Line                 Peripheral IV - Single Lumen 02/01/20 0208 20 G Posterior;Right Hand 3 days                Physical Exam   Constitutional: She is oriented to person, place, and time. She appears well-developed and well-nourished. No distress.   HENT:   Head: Normocephalic and atraumatic.   Eyes: Right eye exhibits no discharge. Left eye exhibits no discharge.   Neck: No JVD present.   Cardiovascular: Normal rate, regular rhythm and normal heart sounds. Exam reveals no gallop and no friction rub.   No murmur heard.  Pulmonary/Chest: Effort normal and breath sounds normal.   Abdominal: Soft. Bowel sounds are normal.   Musculoskeletal: She exhibits no edema.   Neurological: She is alert and oriented to person, place, and time.   Skin: Skin is warm and dry. She is not diaphoretic.   Psychiatric: She has a normal mood and affect. Her behavior is normal. Judgment and thought content normal.       Significant Labs:   BMP:   Recent Labs   Lab 02/03/20  0635 02/03/20  1647 02/04/20  0416   GLU  --  124*  --    NA  --  131*  --    K  --  4.1  --    CL  --  97  --    CO2  --  28  --    BUN  --  10  --    CREATININE  --  0.7  --    CALCIUM  --  8.7  --    MG 1.8  --  1.6   , CMP   Recent Labs   Lab 02/03/20  1647   *   K 4.1   CL 97   CO2 28   *   BUN 10   CREATININE 0.7   CALCIUM 8.7   ANIONGAP 6*   ESTGFRAFRICA >60   EGFRNONAA >60   , CBC   Recent Labs   Lab 02/03/20  0635 02/04/20  0416   WBC 4.57 6.01   HGB 9.0* 8.0*   HCT 27.9* 24.7*    201   , INR   No results for input(s): INR, PROTIME in the last 48 hours., Lipid Panel No results for input(s): CHOL, HDL, LDLCALC, TRIG, CHOLHDL in the last 48 hours., Troponin No results for input(s): TROPONINI in the last 48 hours. and All pertinent lab results from the last 24 hours have been  reviewed.    Significant Imaging: Echocardiogram:   Transthoracic echo (TTE) complete (Cupid Only):   Results for orders placed or performed during the hospital encounter of 10/27/19   Echo Color Flow Doppler? Yes   Result Value Ref Range    BSA 1.73 m2    Ascending aorta 2.61 cm    AV mean gradient 3 mmHg    Ao peak gennaro 1.23 m/s    Ao VTI 17.79 cm    IVS 1.07 0.6 - 1.1 cm    LA size 3.21 cm    Left Atrium Major Axis 5.12 cm    Left Atrium Minor Axis 4.83 cm    LVIDD 4.17 3.5 - 6.0 cm    LVIDS 2.93 2.1 - 4.0 cm    LVOT diameter 1.96 cm    LVOT peak VTI 14.74 cm    PW 0.98 0.6 - 1.1 cm    PV Peak D Gennaro 0.24 m/s    PV Peak S Gennaro 0.29 m/s    RA Major Axis 3.96 cm    RA Width 2.15 cm    RVDD 1.57 cm    TR Max Gennaro 2.06 m/s    LA WIDTH 2.65 cm    Ao root annulus 3.38 cm    AORTIC VALVE CUSP SEPERATION 1.48 cm    LV Diastolic Volume 77.15 mL    LV Systolic Volume 33.07 mL    LVOT peak gennaro 0.80 m/s    FS 30 %    LA volume 35.94 cm3    LV mass 140.48 g    Left Ventricle Relative Wall Thickness 0.47 cm    AV valve area 2.50 cm2    AV Velocity Ratio 0.65     AV index (prosthetic) 0.83     Pulm vein S/D ratio 1.21     LVOT area 3.0 cm2    LVOT stroke volume 44.45 cm3    AV peak gradient 6 mmHg    LV Systolic Volume Index 19.7 mL/m2    LV Diastolic Volume Index 45.94 mL/m2    LA Volume Index 21.4 mL/m2    LV Mass Index 84 g/m2    Triscuspid Valve Regurgitation Peak Gradient 17 mmHg    TDI SEPTAL 0.09 m/s    TDI LATERAL 0.16 m/s    Mean e' 0.13 m/s    Narrative    · Normal left ventricular systolic function. The estimated ejection   fraction is 55%  · No wall motion abnormalities.  · Concentric left ventricular remodeling.  · Normal right ventricular systolic function.  · Mild mitral regurgitation.  · Diastolic pattern consistent with atrial fibrillation observed.        Assessment and Plan:     Brief HPI: Patient seen this morning on rounds without CV complaint. LLE swelling has resolved, pain has improved.     * Acute deep vein  thrombosis (DVT) of femoral vein of left lower extremity  Thrombosis is visualized within the left common femoral, femoral, greater saphenous, and popliteal veins.  Provoked DVT in setting of immobility     Thrombectomy performed on 02/03 for persistent clot   Images were obtained and extensive iliofemoral thrombus noted with compression noted in the CIV and confirmed with IVUS. Successful clot retrieval with Inari device and stent placement in CIV/EIV with excellent results  Eliquis initiated and will need OAC for the forseeable future   Follow up as outpatient     Paroxysmal atrial fibrillation  Currently in SR        VTE Risk Mitigation (From admission, onward)         Ordered     apixaban tablet 10 mg  2 times daily      02/03/20 1431     heparin infusion 1,000 units/500 ml in 0.9% NaCl (pressure line flush)  Intra-op continuous PRN      02/03/20 1156                Brice Perales NP  Cardiology  [facility]

## 2020-02-04 NOTE — SIGNIFICANT EVENT
MET called at 2059 2/2 low BP. Arrived at bedside, patient has no signs of distress.  SBP 70-80s other vital signs stable.  Patient denies shortness of breath, fatigue, chest pain.  Ordered 500ml fluid bolus, discontinue scheduled clonidine.  Will continue to follow.  MET ended 2110.

## 2020-02-04 NOTE — PLAN OF CARE
02/04/20 1003   Post-Acute Status   Post-Acute Authorization Placement   Post-Acute Placement Status Pending Post-Acute Clinical Review   Discharge Delays None known at this time

## 2020-02-04 NOTE — PLAN OF CARE
POC reviewed with the pt, verbalized understanding.  Low BP noted in the evening, MET called, NP discontinued clonidine, rest of VS stable overnight.  AAOx3.  No complaint of pain or any other distress noted overnight.  PIV and port-a-cath remained intact.  On continuous telemetry monitor, SR.  No ectopy noted.  Left popliteal site dressing remained dry/clean/intact.  Safety maintained, free of falls throughout shift.  Instructed to call for any assistance.  Will continue to monitor.

## 2020-02-04 NOTE — PLAN OF CARE
CM met with pt and son, aware of plan for return to facility today to care home care.  Pt plans on returning to regular daily activity at facility.  Pt does not require skilled nursing at this time.  Son and pt requesting [facility] stretcher transport due to insufficient postural support.  Pt has contracted plan with [facility], aware there may be her $300 contractural cost associated with it if not determined medically necessary.      Awaiting [facility] review of orders for transport time.    Shilpa Rodriguez RN    [phone]

## 2020-02-04 NOTE — PLAN OF CARE
[facility] reviewed orders, sent following message for review:    · can you please have m.d review these meds. does he really want to change orders? resident was taking vit c 100mg daily and clonidine was once daily. glycolax bid prn and house protein supp 30cc daily for wound healing were omitted. thanks      Will have physician review and revise if needed.  Transport time will be given once NH approves orders.    Shilpa Rodriguez RN    [phone]

## 2020-02-04 NOTE — PT/OT/SLP PROGRESS
Occupational Therapy  SCREEN    Patient Name:  Annette Garcia   MRN:  [id number]    Orders received. Chart reviewed.  Pt is a [facility] of [facility]. PLOF: Max-total A w/ fxnl t/f's; Mod-max A w/ UB/LBD; dep w/ toileting; set up in sitting for self feed & G/H; BUE WC propulsion.  Pt reports no change in fxnl status since admit.   Pt w/o potential to increase fxnl (I) 2/2 medical/phys status.  Pt does not require full OT eval or cont acute OT svcs.  OT to d/c this date.    SOFIA Galan  2/4/2020

## 2020-02-04 NOTE — NURSING
2059; MET called for low BP, results in chart.    2105; NP ordered discontinued clonidine and continued lisinopril, also ordered NS bolus of 500cc.

## 2020-02-04 NOTE — PLAN OF CARE
[facility]  [address] [place] [address]  [place] [address]  Office: [phone]  Fax: [phone]       NURSING HOME ORDERS    Patient Name: Annette Garcia  YOB: [birth year]    Admit to Nursing Home:  Regular Bed         Diagnoses:  Active Hospital Problems    Diagnosis  POA    *Acute deep vein thrombosis (DVT) of femoral vein of left lower extremity [I82.412]  Yes    Nursing home resident [Z59.3]  Not Applicable     Chronic [facility] ([address], [place] [address])       Paroxysmal atrial fibrillation [I48.0]  Yes     Chronic    Urinary retention [R33.9]  Yes     Chronic     Martinez removed 11/4/19, had 500 cc on bladder scan after several hours      Slow transit constipation [K59.01]  Yes     Chronic    Peripheral vascular disease, unspecified [I73.9]  Yes     Chronic    DLBCL (diffuse large B cell lymphoma) [C83.30]  Yes     Chronic    Acquired hypothyroidism [E03.9]  Yes     Chronic    Epilepsy [G40.909]  Yes     Chronic     Epilepsy type unknown      Renovascular hypertension [I15.0]  Yes     Chronic      Resolved Hospital Problems   No resolved problems to display.     Allergies:  Review of patient's allergies indicates:   Allergen Reactions    Adhesive Itching and Blisters    Penicillins Anaphylaxis    Tramadol Hives    Avelox [moxifloxacin] Rash     Facial and arm itching and redness. Pt states throat closes when given.    Amoxil [amoxicillin]     Aspridrox [aspirin, buffered]     Codeine Other (See Comments)     Throat swelling    Keflex [cephalexin]      Tolerated cefepime and cefazolin    Norvasc [amlodipine]     Red dye Hives    Robitussin [guaifenesin]     Sulfa (sulfonamide antibiotics)     Tylenol [acetaminophen]      Has reaction to Tylenol with red dye and unable to take Extra Strength Tylenol/ CAN ONLY TOLERATE REG STRENGTH TYLENOL    Vicks vaporub [camphor-eucalyptus  oil-menthol]        Discharge Procedure Orders   Diet Adult Regular     Vital signs per facility protocol     Skin assessment every shift      Notify Physician     Order Specific Question Answer Comments   Temperature (F) greater than 101    Systolic Blood Pressure SBP greater than or equal to 160    Systolic Blood Pressure SBP less than or equal to 90    Diastolic Blood Pressure DBP greater than or equal to 110    Diastolic Blood Pressure DBP less than or equal to 60    Pulse greater than or equal to 120    Pulse less than or equal to 50    Respirations Rate RR greater than or equal to 30    Respirations Rate RR less than or equal to 6    Urine output less than 60 over 2 hours   SPO2% less than 90      Martinez / urology care     Full code     Turn patient every 2 hours to prevent pressure ulcer formation       LABS:  Per facility protocol    Nursing Precautions:         - Fall precautions per nursing home protocol   - Seizure precaution per care home protocol   - Decubitus precautions:        -  for positioning   - Pressure reducing foam mattress   - Turn patient every two hours. Use wedge pillows to anchor patient    MISCELLANEOUS CARE:             Colostomy Care:  Empty bag every shift and prn                                             Change and clean site every 48 hours     Martinez Care: Empty Martinez bag every shift.  Change Martinez every month      Medications:   Changes: New apixaban, hold parameter added to clonidine.   Annette Garcia   Home Medication Instructions STEFANO:[id number]    Printed on:02/04/20 1230   Medication Information                      acetaminophen (TYLENOL) 325 MG tablet  Take 2 tablets (650 mg total) by mouth every 4 (four) hours as needed for Pain.             apixaban (ELIQUIS) 5 mg Tab  Take 2 tablets (10 mg total) by mouth 2 (two) times daily for 7 days, THEN 1 tablet (5 mg total) 2 (two) times daily.             ascorbic acid, vitamin C, (VITAMIN C) 500 MG tablet  Take 500  mg by mouth once daily.             calcium carbonate (OS-RICHARDSON) 600 mg calcium (1,500 mg) Tab  Take 0.5 tablets (300 mg total) by mouth once daily.             cloNIDine (CATAPRES) 0.2 MG tablet  Take 1 tablet (0.2 mg total) by mouth once daily. Hold for systolic blood pressure less than 110             levothyroxine (SYNTHROID) 125 MCG tablet  TAKE 1 TABLET (125 MCG TOTAL) BY MOUTH ONCE DAILY.             lisinopril (PRINIVIL,ZESTRIL) 40 MG tablet  TAKE 1 TABLET BY MOUTH EVERY DAY             magnesium oxide (MAG-OX) 400 mg (241.3 mg magnesium) tablet  Take 1 tablet (400 mg total) by mouth 2 (two) times daily.             mirtazapine (REMERON) 7.5 MG Tab  Take 1 tablet (7.5 mg total) by mouth every evening.             multivitamin (MULTIPLE VITAMINS DAILY ORAL)  Take by mouth.             nutritional supplements Liqd  Take 30 mLs by mouth once daily.             ondansetron (ZOFRAN) 4 MG tablet  Take 1 tablet (4 mg total) by mouth every 8 (eight) hours as needed for Nausea.             OXcarbazepine (TRILEPTAL) 300 MG Tab  Take 1 tablet (300 mg total) by mouth nightly.             PHENobarbital (LUMINAL) 64.8 MG tablet  Take 1 tablet (64.8 mg total) by mouth every evening.             polyethylene glycol (GLYCOLAX) 17 gram PwPk  Take 17 g by mouth 2 (two) times daily as needed (Constipation).             pravastatin (PRAVACHOL) 20 MG tablet  Take 1 tablet (20 mg total) by mouth once daily.             vitamin D (VITAMIN D3) 1000 units Tab  Take 1,000 Units by mouth once daily.                       _________________________________  José Manuel Guidry MD  02/04/2020

## 2020-02-04 NOTE — PLAN OF CARE
Pt approved to return to [facility], nurse to call report to [phone] to [facility] rm [address]. [facility] (per pt request) requested for  at 3:00 via [facility].         02/04/20 3074   Final Note   Assessment Type Final Discharge Note   Anticipated Discharge Disposition [facility]   Hospital Follow Up  Appt(s) scheduled? Yes   Discharge plans and expectations educations in teach back method with documentation complete? Yes       Future Appointments   Date Time Provider Department Center   2/7/2020 10:00 AM Ro Dumont, MICHELL [facility] UROLOGY [place] [facility]   4/2/2020 11:00 AM CHAIR [facility] [place] [facility]

## 2020-02-04 NOTE — H&P
MET called at 2059 2/2 low BP. Arrived at bedside, patient has no signs of distress.  SBP 70-80s other vital signs stable.  Patient denies shortness of breath, fatigue, chest pain.  Ordered 500ml fluid bolus, discontinue scheduled clonidine.  Will continue to follow.

## 2020-02-04 NOTE — NURSING
2059: MET called overhead. VN cued in to pt's room for documentation. MET called for hypotension. Bedside nurse, Kate, states that bp was 79/44 with machine and 78/30 manually. Pt alert. Attempted to call NP no answer on office number  2102: oeb=042, o2 sats 97% on RA. SR 70 on telemetry  2103: ERIC Mayo NP notified of MET via secure chat. State she is on her way  2105: bp 82/47. 500 cc normal saline bolus ordered and adjustments made to existing meds for hypertension. MET ended    Saff present for MET: bedside nurse, prashant buck RN, OC CLIFF Tsai, Icu nurses mal and anushka, RT janet and Gina ritter, NP

## 2020-02-04 NOTE — PLAN OF CARE
Awaiting review of revised orders by facility.  CM faxed to [phone] as facility unable to receive in right care at this time.       02/04/20 1318   Post-Acute Status   Post-Acute Authorization Placement   Post-Acute Placement Status Pending Post-Acute Clinical Review

## 2020-02-04 NOTE — DISCHARGE SUMMARY
[facility]  Discharge Summary      Patient Name: Annette Garcia  MRN: [id number]  Admission Date: 1/30/2020  Hospital Length of Stay: 5 days  Discharge Date and Time: 2/4/2020  3:08 PM  Attending Physician: José Manuel Guidry MD   Discharging Provider: José Manuel Guidry MD  Primary Care Provider: Jose Valles MD      HPI:   Annette Garcia is an 84 year old white woman with peripheral vascular disease, renovascular hypertension, renal artery stenosis status post left renal artery stent placement on 7/19/17, coronary artery disease with history of myocardial infarction, paroxysmal atrial fibrillation, diffuse large B cell lymphoma, anemia, epilepsy, hypothyroidism, chronic hyponatremia, depression, lumbar and sacroiliac joint osteoarthritis with chronic low back pain and history of lumbar spine surgery in 2013, slow transit constipation, history of appendectomy, history of cholecystectomy history hysterectomy, history of small bowel obstruction status post small bowel resection on 8/23/16, history of sigmoid colectomy with end colostomy on 10/29/19. She lives at [facility] in [place], [place]. She is wheelchair bound Her son has epilepsy. Her daughter has tuberous sclerosis. Her primary care physician is Dr. Jose Torres. Her pain management specialist is Dr. Chirag Bates. Her neurologist is Dr. Gamal Lock. Her cardiologist is Dr. Timmy Simmons   She was seen in clinic by Dr. Simmons on 1/30/20 to discuss results of her Holter monitor. Dr. Simmons noticed she had leg swelling and advised her to go to [facility], where venous ultrasound showed thrombosis in the left common femoral, femoral, greater saphenous, and popliteal veins. Labs were significant for hyponatremia (sodium 131 mmol/L, decreased from 137 on 11/22/19). She was admitted to [facility].     Procedure(s)  (LRB):  THROMBECTOMY (N/A)  Venogram, Lower Extremity, Unilateral  PTA, PERIPHERAL VESSEL (N/A)  Stent, Iliac Vein      Hospital Course:   She was put on enoxaparin then switched to heparin. Dr. Simmons performed peripheral mechanical thrombectomy with stent placement on 2/3/20. She was started on apixaban. She was prescribed apixaban and discharged home.     Consults:   Consults (From admission, onward)        Status Ordering Provider     Inpatient consult to [facility]  Once     Provider:  (Not yet assigned)    Completed BRYNN LE     IP consult to case management  Once     Provider:  (Not yet assigned)    Acknowledged INNOCENT-PAULA PA        Final Active Diagnoses:    Diagnosis Date Noted POA    PRINCIPAL PROBLEM:  Acute deep vein thrombosis (DVT) of femoral vein of left lower extremity [I82.412] 01/30/2020 Yes    Nursing home resident [Z59.3] 01/30/2020 Not Applicable     Chronic    Paroxysmal atrial fibrillation [I48.0] 12/28/2019 Yes     Chronic    Urinary retention [R33.9] 11/04/2019 Yes     Chronic    Slow transit constipation [K59.01] 10/28/2019 Yes     Chronic    Peripheral vascular disease, unspecified [I73.9] 10/28/2019 Yes     Chronic    DLBCL (diffuse large B cell lymphoma) [C83.30] 09/09/2016 Yes     Chronic    Acquired hypothyroidism [E03.9] 08/18/2016 Yes     Chronic    Epilepsy [G40.909] 08/18/2016 Yes     Chronic    Renovascular hypertension [I15.0] 08/18/2016 Yes     Chronic      Problems Resolved During this Admission:       Discharged Condition: good    Disposition: shelter Nursing Home    Follow Up:  Follow-up Information     Timmy Simmons MD In 2 weeks.    Specialties:  INTERVENTIONAL CARDIOLOGY, Cardiology  Contact information:  [address]  [address]  [facility] [id number] [phone]                 Patient Instructions:      Diet Adult Regular     Vital signs per facility protocol     Skin assessment every shift      Notify Physician     Order  Specific Question Answer Comments   Temperature (F) greater than 101    Systolic Blood Pressure SBP greater than or equal to 160    Systolic Blood Pressure SBP less than or equal to 90    Diastolic Blood Pressure DBP greater than or equal to 110    Diastolic Blood Pressure DBP less than or equal to 60    Pulse greater than or equal to 120    Pulse less than or equal to 50    Respirations Rate RR greater than or equal to 30    Respirations Rate RR less than or equal to 6    Urine output less than 60 over 2 hours   SPO2% less than 90      Martinez / urology care     Full code     Turn patient every 2 hours to prevent pressure ulcer formation       Significant Diagnostic Studies:   US Lower Extremity Veins Bilateral 1/30/20:   FINDINGS:  Right lower extremity:  No evidence of clot involving the bilateral common femoral, greater saphenous, femoral, popliteal, peroneal, anterior and posterior tibial veins.  All venous structures demonstrate normal respiratory phasicity and augment adequately.  No evidence of soft tissue mass or Baker's cyst.  Left lower extremity:  Thrombosis is visualized within the left common femoral, femoral, greater saphenous, and popliteal veins.  Left posterior tibial, anterior tibial, and peroneal veins remain patent.  Impression:  Deep venous thrombosis within the left common femoral, femoral, greater saphenous, and popliteal veins.  No evidence of right lower extremity deep venous thrombosis.  US Lower Extremity Veins Left 2/03/20: FINDINGS:  Left thigh veins: Similar distribution of previously demonstrated left lower extremity thrombus which appears occlusive at the left common femoral vein, mid-distal femoral vein, and popliteal vein.  There is also more proximal occlusion involving the left iliac vein, not previously imaged.  Left calf veins: The visualized calf veins appear patent.  Contralateral CFV: The contralateral (right) common femoral vein is patent and free of thrombus.  Impression:    Redemonstration of left lower extremity deep venous thrombus involving the left iliac, common femoral, femoral, greater saphenous, and popliteal veins.     Medications:  Reconciled Home Medications:      Medication List      START taking these medications    apixaban 5 mg Tab  Commonly known as:  ELIQUIS  Take 2 tablets (10 mg total) by mouth 2 (two) times daily for 7 days, THEN 1 tablet (5 mg total) 2 (two) times daily.  Start taking on:  February 4, 2020     cloNIDine 0.2 MG tablet  Commonly known as:  CATAPRES  Take 1 tablet (0.2 mg total) by mouth once daily. Hold for systolic blood pressure less than 110     magnesium oxide 400 mg (241.3 mg magnesium) tablet  Commonly known as:  MAG-OX  Take 1 tablet (400 mg total) by mouth 2 (two) times daily.     nutritional supplements Liqd  Take 30 mLs by mouth once daily.        CONTINUE taking these medications    acetaminophen 325 MG tablet  Commonly known as:  TYLENOL  Take 2 tablets (650 mg total) by mouth every 4 (four) hours as needed for Pain.     calcium carbonate 600 mg calcium (1,500 mg) Tab  Commonly known as:  OS-RICHARDSON  Take 0.5 tablets (300 mg total) by mouth once daily.     levothyroxine 125 MCG tablet  Commonly known as:  SYNTHROID  TAKE 1 TABLET (125 MCG TOTAL) BY MOUTH ONCE DAILY.     lisinopril 40 MG tablet  Commonly known as:  PRINIVIL,ZESTRIL  TAKE 1 TABLET BY MOUTH EVERY DAY     mirtazapine 7.5 MG Tab  Commonly known as:  REMERON  Take 1 tablet (7.5 mg total) by mouth every evening.     MULTIPLE VITAMINS DAILY ORAL  Take by mouth.     ondansetron 4 MG tablet  Commonly known as:  ZOFRAN  Take 1 tablet (4 mg total) by mouth every 8 (eight) hours as needed for Nausea.     OXcarbazepine 300 MG Tab  Commonly known as:  TRILEPTAL  Take 1 tablet (300 mg total) by mouth nightly.     PHENobarbital 64.8 MG tablet  Commonly known as:  LUMINAL  Take 1 tablet (64.8 mg total) by mouth every evening.     polyethylene glycol 17 gram Pwpk  Commonly known as:   GLYCOLAX  Take 17 g by mouth 2 (two) times daily as needed (Constipation).     pravastatin 20 MG tablet  Commonly known as:  PRAVACHOL  Take 1 tablet (20 mg total) by mouth once daily.     Vitamin C 500 MG tablet  Generic drug:  ascorbic acid (vitamin C)  Take 500 mg by mouth once daily.     vitamin D 1000 units Tab  Commonly known as:  VITAMIN D3  Take 1,000 Units by mouth once daily.        STOP taking these medications    magnesium 250 mg Tab            Indwelling Lines/Drains at time of discharge:   Lines/Drains/Airways     Central Venous Catheter Line                 Port A Cath Single Lumen 01/30/20 right atrial 5 days          Drain                 Colostomy 10/29/19 1200 LLQ 97 days         Urethral Catheter 12/02/19 64 days          Pressure Ulcer                 Pressure Injury 11/20/19 1300 Coccyx Stage 1 75 days                Time spent on the discharge of patient: 35 minutes  Patient was seen and examined on the date of discharge and determined to be suitable for discharge.         José Manuel Guidry MD  Department of Hospital Medicine  [facility]

## 2020-02-04 NOTE — SUBJECTIVE & OBJECTIVE
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    In the setting of septic shock and near death    Petit mal epilepsy 1954    Scoliosis of lumbar spine     Seizures     Unspecified hypothyroidism        Past Surgical History:   Procedure Laterality Date    APPENDECTOMY      BACK SURGERY  1988    vertebral fracture    BACK SURGERY  02/2013    lumbar L2-5    CATARACT EXTRACTION, BILATERAL Bilateral     CHOLECYSTECTOMY      open    EYE SURGERY Bilateral     cataract removal with lens implant    HYSTERECTOMY      PORTACATH PLACEMENT Right 09/2016    RENAL ARTERY STENT Left 07/19/2017    SIGMOIDECTOMY  10/29/2019    SMALL INTESTINE SURGERY  08/23/2016    TONSILLECTOMY      VAGINAL HYSTERECTOMY W/ ANTERIOR AND POSTERIOR VAGINAL REPAIR         Review of patient's allergies indicates:   Allergen Reactions    Adhesive Itching and Blisters    Penicillins Anaphylaxis    Tramadol Hives    Avelox [moxifloxacin] Rash     Facial and arm itching and redness. Pt states throat closes when given.    Amoxil [amoxicillin]     Aspridrox [aspirin, buffered]     Codeine Other (See Comments)     Throat swelling    Keflex [cephalexin]      Tolerated cefepime and cefazolin    Norvasc [amlodipine]     Red dye Hives    Robitussin [guaifenesin]     Sulfa (sulfonamide antibiotics)     Tylenol [acetaminophen]      Has reaction to Tylenol with red dye and unable to take Extra Strength Tylenol/ CAN ONLY TOLERATE REG STRENGTH TYLENOL    Vicks vaporub [camphor-eucalyptus oil-menthol]        No current facility-administered medications on file prior to encounter.      Current Outpatient Medications on File Prior to Encounter   Medication Sig    acetaminophen (TYLENOL) 325 MG tablet Take 2 tablets (650 mg total) by mouth every 4 (four) hours as needed for Pain.     ascorbic acid, vitamin C, (VITAMIN C) 500 MG tablet Take 500 mg by mouth once daily.    calcium carbonate (OS-RICHARDSON) 600 mg calcium (1,500 mg) Tab Take 0.5 tablets (300 mg total) by mouth once daily.    levothyroxine (SYNTHROID) 125 MCG tablet TAKE 1 TABLET (125 MCG TOTAL) BY MOUTH ONCE DAILY.    lisinopril (PRINIVIL,ZESTRIL) 40 MG tablet TAKE 1 TABLET BY MOUTH EVERY DAY    mirtazapine (REMERON) 7.5 MG Tab Take 1 tablet (7.5 mg total) by mouth every evening.    multivitamin (MULTIPLE VITAMINS DAILY ORAL) Take by mouth.    OXcarbazepine (TRILEPTAL) 300 MG Tab Take 1 tablet (300 mg total) by mouth nightly.    PHENobarbital (LUMINAL) 64.8 MG tablet Take 1 tablet (64.8 mg total) by mouth every evening.    pravastatin (PRAVACHOL) 20 MG tablet Take 1 tablet (20 mg total) by mouth once daily.    vitamin D (VITAMIN D3) 1000 units Tab Take 1,000 Units by mouth once daily.    [DISCONTINUED] cloNIDine (CATAPRES) 0.2 MG tablet TAKE 1 TABLET (0.2 MG TOTAL) BY MOUTH 2 (TWO) TIMES DAILY.    [DISCONTINUED] magnesium 250 mg Tab Take 1 tablet (250 mg total) by mouth once daily. (Patient taking differently: Take 400 mg by mouth 2 (two) times daily. )    ondansetron (ZOFRAN) 4 MG tablet Take 1 tablet (4 mg total) by mouth every 8 (eight) hours as needed for Nausea.     Family History     Problem Relation (Age of Onset)    Heart attack Father    Hypertension Father    Pancreatic cancer Mother        Tobacco Use    Smoking status: Never Smoker    Smokeless tobacco: Never Used   Substance and Sexual Activity    Alcohol use: No    Drug use: No    Sexual activity: Not on file     Review of Systems   Constitution: Negative for diaphoresis.   HENT: Negative.    Eyes: Negative.    Cardiovascular: Positive for leg swelling. Negative for chest pain, irregular heartbeat, near-syncope, orthopnea, palpitations, paroxysmal nocturnal dyspnea and syncope.   Respiratory: Negative for cough, shortness of breath, sputum production and  wheezing.    Endocrine: Negative.    Hematologic/Lymphatic: Negative for bleeding problem. Does not bruise/bleed easily.   Skin: Negative.    Musculoskeletal: Negative.    Gastrointestinal: Negative.    Genitourinary: Negative.    Neurological: Positive for weakness.   Psychiatric/Behavioral: Negative.    Allergic/Immunologic: Negative.      Objective:     Vital Signs (Most Recent):  Temp: 98.4 °F (36.9 °C) (02/04/20 0742)  Pulse: 78 (02/04/20 0742)  Resp: 20 (02/04/20 0742)  BP: (!) 114/58 (02/04/20 0742)  SpO2: 96 % (02/04/20 0822) Vital Signs (24h Range):  Temp:  [96.2 °F (35.7 °C)-98.9 °F (37.2 °C)] 98.4 °F (36.9 °C)  Pulse:  [68-97] 78  Resp:  [16-20] 20  SpO2:  [94 %-98 %] 96 %  BP: ()/(30-91) 114/58     Weight: 84.4 kg (186 lb 1.1 oz)  Body mass index is 40.26 kg/m².    SpO2: 96 %  O2 Device (Oxygen Therapy): room air      Intake/Output Summary (Last 24 hours) at 2/4/2020 1020  Last data filed at 2/4/2020 0510  Gross per 24 hour   Intake 75 ml   Output 800 ml   Net -725 ml       Lines/Drains/Airways     Central Venous Catheter Line                 Port A Cath Single Lumen 01/30/20 right atrial 5 days          Drain                 Colostomy 10/29/19 1200 LLQ 97 days         Urethral Catheter 12/02/19 64 days          Peripheral Intravenous Line                 Peripheral IV - Single Lumen 02/01/20 0208 20 G Posterior;Right Hand 3 days                Physical Exam   Constitutional: She is oriented to person, place, and time. She appears well-developed and well-nourished. No distress.   HENT:   Head: Normocephalic and atraumatic.   Eyes: Right eye exhibits no discharge. Left eye exhibits no discharge.   Neck: No JVD present.   Cardiovascular: Normal rate, regular rhythm and normal heart sounds. Exam reveals no gallop and no friction rub.   No murmur heard.  Pulmonary/Chest: Effort normal and breath sounds normal.   Abdominal: Soft. Bowel sounds are normal.   Musculoskeletal: She exhibits no edema.    Neurological: She is alert and oriented to person, place, and time.   Skin: Skin is warm and dry. She is not diaphoretic.   Psychiatric: She has a normal mood and affect. Her behavior is normal. Judgment and thought content normal.       Significant Labs:   BMP:   Recent Labs   Lab 02/03/20  0635 02/03/20  1647 02/04/20  0416   GLU  --  124*  --    NA  --  131*  --    K  --  4.1  --    CL  --  97  --    CO2  --  28  --    BUN  --  10  --    CREATININE  --  0.7  --    CALCIUM  --  8.7  --    MG 1.8  --  1.6   , CMP   Recent Labs   Lab 02/03/20  1647   *   K 4.1   CL 97   CO2 28   *   BUN 10   CREATININE 0.7   CALCIUM 8.7   ANIONGAP 6*   ESTGFRAFRICA >60   EGFRNONAA >60   , CBC   Recent Labs   Lab 02/03/20  0635 02/04/20  0416   WBC 4.57 6.01   HGB 9.0* 8.0*   HCT 27.9* 24.7*    201   , INR   No results for input(s): INR, PROTIME in the last 48 hours., Lipid Panel No results for input(s): CHOL, HDL, LDLCALC, TRIG, CHOLHDL in the last 48 hours., Troponin No results for input(s): TROPONINI in the last 48 hours. and All pertinent lab results from the last 24 hours have been reviewed.    Significant Imaging: Echocardiogram:   Transthoracic echo (TTE) complete (Cupid Only):   Results for orders placed or performed during the hospital encounter of 10/27/19   Echo Color Flow Doppler? Yes   Result Value Ref Range    BSA 1.73 m2    Ascending aorta 2.61 cm    AV mean gradient 3 mmHg    Ao peak gennaro 1.23 m/s    Ao VTI 17.79 cm    IVS 1.07 0.6 - 1.1 cm    LA size 3.21 cm    Left Atrium Major Axis 5.12 cm    Left Atrium Minor Axis 4.83 cm    LVIDD 4.17 3.5 - 6.0 cm    LVIDS 2.93 2.1 - 4.0 cm    LVOT diameter 1.96 cm    LVOT peak VTI 14.74 cm    PW 0.98 0.6 - 1.1 cm    PV Peak D Gennaro 0.24 m/s    PV Peak S Gennaro 0.29 m/s    RA Major Axis 3.96 cm    RA Width 2.15 cm    RVDD 1.57 cm    TR Max Gennaro 2.06 m/s    LA WIDTH 2.65 cm    Ao root annulus 3.38 cm    AORTIC VALVE CUSP SEPERATION 1.48 cm    LV Diastolic Volume  77.15 mL    LV Systolic Volume 33.07 mL    LVOT peak fela 0.80 m/s    FS 30 %    LA volume 35.94 cm3    LV mass 140.48 g    Left Ventricle Relative Wall Thickness 0.47 cm    AV valve area 2.50 cm2    AV Velocity Ratio 0.65     AV index (prosthetic) 0.83     Pulm vein S/D ratio 1.21     LVOT area 3.0 cm2    LVOT stroke volume 44.45 cm3    AV peak gradient 6 mmHg    LV Systolic Volume Index 19.7 mL/m2    LV Diastolic Volume Index 45.94 mL/m2    LA Volume Index 21.4 mL/m2    LV Mass Index 84 g/m2    Triscuspid Valve Regurgitation Peak Gradient 17 mmHg    TDI SEPTAL 0.09 m/s    TDI LATERAL 0.16 m/s    Mean e' 0.13 m/s    Narrative    · Normal left ventricular systolic function. The estimated ejection   fraction is 55%  · No wall motion abnormalities.  · Concentric left ventricular remodeling.  · Normal right ventricular systolic function.  · Mild mitral regurgitation.  · Diastolic pattern consistent with atrial fibrillation observed.

## 2020-02-05 ENCOUNTER — PATIENT MESSAGE (OUTPATIENT)
Dept: UROLOGY | Facility: CLINIC | Age: 85
End: 2020-02-05

## 2020-02-05 ENCOUNTER — DOCUMENTATION ONLY (OUTPATIENT)
Dept: UROLOGY | Facility: CLINIC | Age: 85
End: 2020-02-05

## 2020-02-05 NOTE — PROGRESS NOTES
Verbal orders for [facility]:    1. Patient's catheter is due to be exchanged.   2. Remove current gonzales  3. Prep patient in sterile fashion, place 16 fr catheter, inflate balloon with 10cc and connect to drainage bag.       ZIA MccormickC  [facility]    MD [facility]

## 2020-02-05 NOTE — PROGRESS NOTES
[facility]                                                                                     Skilled Nursing Facility                                                                 Progress Note      Admit Date:   02/04/2020  JESSICA   Principal Problem:   debility R/T recent left femoral DVT, s/p thrombectomy  HPI obtained from patient interview and chart review    Visit Date: 2/6/2020    Chief Complaint:  Return to facility as skilled nursing after recent admit for acute DVT    HPI:   Ms. Garcia is an 84-year-old female with a past medical history of peripheral vascular disease, renovascular hypertension, renal artery stenosis status post left renal artery stent placement on 7/19/17, coronary artery disease with history of myocardial infarction, diffuse large B cell lymphoma, anemia, epilepsy, hypothyroidism, chronic hyponatremia, depression, lumbar and sacroiliac joint osteoarthritis with chronic low back pain and history of lumbar spine surgery in 2013, slow transit constipation, with hx of small bowel resection on 8/23/16.       Interval history:  Patient seen today after return to facility for recent admit for acute DVT.  She was previously a long-term resident of this facility.  She was apparently at a cardiology appointment regarding her recent results of her 48 hr Holter monitor, patient had increase in leg edema, sent to ED for evaluation per MD.  Patient found to have left femoral vein DVT, she is now status post thrombectomy with stent placement on 02/03.  She returns to facility under skilled nursing care.  Patient is now on Eliquis b.i.d. for anticoagulation.  Wound evaluation completed today of coccyx pressure ulcer, wound has improved since last evaluated. Awaiting measurements since return to facility. SMILEY RN at facility continuing aggressive wound care, will continue to follow  weekly.  Of note, patient is very debilitated due to multiple comorbidities. Patient continues with indwelling Martinez, which was exchanged on 02/05.  Patient has no complaints today during visit.  /60, HR 77, controlled on current cardiac regimen. She has significant LE edema noted to her LLE. Her most recent weight is 131.4 lb, will trend.  Will continue to monitor closely.    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    Past Surgical History: Patient has a past surgical history that includes Cholecystectomy; Appendectomy; Tonsillectomy; Vaginal hysterectomy w/ anterior and posterior vaginal repair; Cataract extraction, bilateral (Bilateral); Hysterectomy; Eye surgery (Bilateral); Portacath placement (Right, 09/2016); Back surgery (1988); Back surgery (02/2013); Renal artery stent (Left, 07/19/2017); Small intestine surgery (08/23/2016); and Sigmoidectomy (10/29/2019).    Social History: Patient reports that she has never smoked. She has never used smokeless tobacco. She reports that she does not drink alcohol or use drugs.    Family History: family history includes Heart attack in her father; Hypertension in her father; Pancreatic cancer in her mother.    Allergies: Patient is allergic to adhesive; penicillins; tramadol; avelox [moxifloxacin]; amoxil [amoxicillin]; aspridrox [aspirin, buffered]; codeine; keflex [cephalexin]; norvasc [amlodipine]; red dye; robitussin [guaifenesin]; sulfa (sulfonamide antibiotics); tylenol [acetaminophen]; and vicks vaporub [camphor-eucalyptus oil-menthol].    ROS  Constitutional: Negative for fever or fatigue  Eyes: Negative for blurred vision, double vision and discharge.   Respiratory: Negative for cough, shortness of breath and  wheezing.    Cardiovascular: Negative for chest pain, palpitations, and +leg swelling.   Gastrointestinal: Negative for abdominal pain, constipation, diarrhea, + intermittent nausea and vomiting.   Genitourinary: Negative for dysuria, frequency and urgency.   Musculoskeletal:  + generalized weakness. Negative for back pain and myalgias  Skin: Negative for itching and rash, +stage III coccyx pressure ulcer., improving   Neurological: Negative for dizziness, speech change, and headaches.   Psychiatric/Behavioral: Negative for depression. The patient is not nervous/anxious.      PEx     Constitutional: Patient appears well-developed and in no distress   Head: Normocephalic and atraumatic.   Eyes: Pupils are equal, round, and reactive to light.   Neck: Normal range of motion. Neck supple.   Cardiovascular: Normal rate, regular rhythm and normal heart sounds, + LLE edema +3.    Pulmonary/Chest: Effort normal and breath sounds are clear  Genitourinary:   indwelling Martinez  Abdominal: Soft. Bowel sounds are normal, +ostomy with brown stool noted  Musculoskeletal: Normal range of motion  Neurological: Alert and oriented to person, place, and time.   Psychiatric: Normal mood and affect. Behavior is normal  Skin: Skin is warm and dry, + stage III pressure ulcer to coccyx (previously stage II), R U chest port a cath- accessed    Wound evaluation completed on 2/6/20:    Wound location:  Stage III coccyx pressure ulcer, (previously called Stage II pressure ulcer to coccyx), improving  Date identified:  11/13/2019  Present on admit:  Yes  Appearance of wound:  50% granulation/50% slough  Drainage characteristic:  Serosanguineous  Wound length:  0.90 cm , awaiting updated measurements  Wound width:  0.30 cm  Wound depth:0.20 cm   Alie wound area:  Irregular edges, scar tissue, normal skin tone   Following: This NP weekly- last observed on (2/6//2020), wound care RN weekly- last observed on (2/6/2020)    Assessment and  Plan:    After care of recent Acute deep vein thrombosis (DVT) of femoral vein of left lower extremity, improving  -s/p Thrombectomy performed on 02/03 for persistent clot, with stent placement  -cardiologist is Dr. Simmons  -Provoked DVT in setting of immobility     -Eliquis initiated and will need OAC for the forseeable future   -continue Eliquis 10 mg p.o. b.i.d. x7 days for bolus dosing-end date 02/11, then changed to Eliquis 5 mg p.o. b.i.d. indefinitely until follow-up with Cardiology    Debility r/t recent DVT, immobility, ongoing  - Continue with PT/OT for gait training and strengthening and restoration of ADL's   - Encourage mobility, OOB in chair, and early ambulation as appropriate  - Fall precautions   - Monitor for bowel and bladder dysfunction  - Monitor for and prevent skin breakdown and pressure ulcers  - Continue DVT prophylaxis with  Eliquis  - Pain control: Tylenol PRN    Peripheral vascular disease, unspecified, chronic  Remote Hx of MI   HTN with Hyperlipidemia, stable  -LDL 97 11/2019  -Continue Lisinopril, Coreg ( d/anabella during admit), pravastatin, Clonidine QD  -Continue current regimen    Urinary retention, ongoing  Recent Hx of  Proteus mirabilis UTI, resolved  -Follow up with Urology as outpatient.   -Gonzales re inserted at facility on 11/15 for retention  -1/17 Tx with trimethoprim   -Continue indwelling gonzales    Multiple Wounds, ongoing  1.  Stage III coccyx pressure ulcer ( previously called Stage II coccyx PU)  2.R chest wall port accessed-de accessed  -Continue Vit C, prostat, HS 2 oz BID  - WC RN following closely, will follow weekly  - wound assessment completed, continue with current orders    Decreased Appetite, ongoing  -Continue Remeron , HS 2 oz BID    Hypoalbuminemia, ongoing  Hyponatremia, ongoing  Hypophosphatemia, improved  Hypomagnesemia, improved  Hypokalemia, improved  -continue  Mg replacement  -continue ProStat, vitamin/mineral replacement    Vitamin D Deficiency,  stable  -most recent vitamin-D level 16 02/2019, 22.2 1/2020  -Continue Vitamin D 3 replacement + Ca/Vit D supplement  -2/6 Most recent level 22.2, improving on supplementation    Epilepsy, chronic, stable  MDD with recurrent episodes, ongoing  -Continue home mirtazapine 7.5mg HS.  -Continue phenobarb, Trileptal  -2/6 recent phenobarb level 12.4-low, initiate redraw to verify and may increase dose    Acquired hypothyroidism, stable  -most recent TSH 0.507 11/2019 (10.32 previously)  -Continue home levothyroxine 125 mcg before breakfast.  -2/6 TSH 4.12, will repeat after acute DVT tx resolved due to inflammation    Recent Hx of Colon necrosis, improved   S/P sigmoidectomy with ostomy placement 10/29/19  E. coli bacteremia, resolved  -Previously Treated with Keflex and metronidazole per ID until 11/15/16  -Continue ostomy care    DLBCL (diffuse large B cell lymphoma)  Anemia due to antineoplastic chemotherapy  Thrombocytopenia, stable  -most recent H&H 9/28 11/2019  -Transfused pRBCs during admit  -Patient has RU chest wall port  -Follow up with hem onc as outpatient.    Slow transit constipation, ongoing  -Continue polyethylene glycol BID (home med)        Right vocal cord paralysis with hoarse voice, resolved  -ENT consulted to during admit, recommended a CT neck and chest with IV contrast once kidney function improved  -ST following ,Chloraseptic spray PRN sore throat          Recent Hx of Right ankle pain, resolved  -12/3 x-ray of right ankle negative for fracture or abnormality, no further interventions needed      Outpatient MDs: Her primary care physician is Dr. Jose Torres. Her pain management specialist is Dr. Chirag Bates. Her neurologist is Dr. Gamal Lock.       Recent Lab work: 1/2020  TSH 4.12 high, phenobarb level 12.4 low, Vitamin D 22.2 low    Future Appointments   Date Time Provider Department Center   4/2/2020 11:00 AM CHAIR 07 [facility]     Extended time  visit:  Total time: 68 minutes  Start Time:0930  End Time:1038  Non face-to-face time: 39 minutes  Description of time:review over medical records from admission, provider notes, med rec, changes to plan of care    Anat Dawkins NP          Patient note was created using MModal Dictation.  Any errors in syntax or even information may not have been identified and edited on initial review prior to signing this note.

## 2020-02-05 NOTE — PROGRESS NOTES
"Subjective:       Patient ID: Annette Garcia is a 84 y.o. female.    Chief Complaint: No chief complaint on file.   ***  This is a 84 y.o.  female patient that is {Blank single:31822::"new to me.","new to me but not new to the system.","an established patient of mine."}  The patient is {Blank single:34008::"self referred","referred to me by"} *** for ***.         LAST PSA  No results found for: PSA, PSADIAG, PSATOTAL, PSAFREE    Lab Results   Component Value Date    CREATININE 0.7 02/03/2020       I personally reviewed the images: ***  No results found in the last 24 hours.      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    In the setting of septic shock and near death    Petit mal epilepsy 1954    Scoliosis of lumbar spine     Seizures     Unspecified hypothyroidism        Past Surgical History:   Procedure Laterality Date    APPENDECTOMY      BACK SURGERY  1988    vertebral fracture    BACK SURGERY  02/2013    lumbar L2-5    CATARACT EXTRACTION, BILATERAL Bilateral     CHOLECYSTECTOMY      open    EYE SURGERY Bilateral     cataract removal with lens implant    HYSTERECTOMY      PORTACATH PLACEMENT Right 09/2016    RENAL ARTERY STENT Left 07/19/2017    SIGMOIDECTOMY  10/29/2019    SMALL INTESTINE SURGERY  08/23/2016    TONSILLECTOMY      VAGINAL HYSTERECTOMY W/ ANTERIOR AND POSTERIOR VAGINAL REPAIR         Family History   Problem Relation Age of Onset    Pancreatic cancer Mother     Hypertension Father     Heart attack Father        Social History     Tobacco Use    Smoking status: Never Smoker    Smokeless tobacco: Never Used   Substance Use Topics    Alcohol use: No    Drug use: No       Current Outpatient Medications on File Prior to Visit   Medication Sig Dispense Refill    acetaminophen (TYLENOL) 325 MG tablet Take 2 " tablets (650 mg total) by mouth every 4 (four) hours as needed for Pain.  0    apixaban (ELIQUIS) 5 mg Tab Take 2 tablets (10 mg total) by mouth 2 (two) times daily for 7 days, THEN 1 tablet (5 mg total) 2 (two) times daily. 748 tablet 0    ascorbic acid, vitamin C, (VITAMIN C) 500 MG tablet Take 500 mg by mouth once daily.      calcium carbonate (OS-RICHARDSON) 600 mg calcium (1,500 mg) Tab Take 0.5 tablets (300 mg total) by mouth once daily.  0    cloNIDine (CATAPRES) 0.2 MG tablet Take 1 tablet (0.2 mg total) by mouth once daily. Hold for systolic blood pressure less than 110 180 tablet 3    levothyroxine (SYNTHROID) 125 MCG tablet TAKE 1 TABLET (125 MCG TOTAL) BY MOUTH ONCE DAILY. 90 tablet 3    lisinopril (PRINIVIL,ZESTRIL) 40 MG tablet TAKE 1 TABLET BY MOUTH EVERY DAY 90 tablet 3    magnesium oxide (MAG-OX) 400 mg (241.3 mg magnesium) tablet Take 1 tablet (400 mg total) by mouth 2 (two) times daily.  0    mirtazapine (REMERON) 7.5 MG Tab Take 1 tablet (7.5 mg total) by mouth every evening. 30 tablet 11    multivitamin (MULTIPLE VITAMINS DAILY ORAL) Take by mouth.      nutritional supplements Liqd Take 30 mLs by mouth once daily.      ondansetron (ZOFRAN) 4 MG tablet Take 1 tablet (4 mg total) by mouth every 8 (eight) hours as needed for Nausea. 25 tablet 2    OXcarbazepine (TRILEPTAL) 300 MG Tab Take 1 tablet (300 mg total) by mouth nightly. 90 tablet 3    PHENobarbital (LUMINAL) 64.8 MG tablet Take 1 tablet (64.8 mg total) by mouth every evening. 90 tablet 5    polyethylene glycol (GLYCOLAX) 17 gram PwPk Take 17 g by mouth 2 (two) times daily as needed (Constipation).  0    pravastatin (PRAVACHOL) 20 MG tablet Take 1 tablet (20 mg total) by mouth once daily. 90 tablet 3    vitamin D (VITAMIN D3) 1000 units Tab Take 1,000 Units by mouth once daily.       Current Facility-Administered Medications on File Prior to Visit   Medication Dose Route Frequency Provider Last Rate Last Dose    [COMPLETED]  heparin, porcine (PF) 100 unit/mL injection flush 500 Units  500 Units Intra-Catheter Once José Manuel Guidry MD   500 Units at 02/04/20 1503    [DISCONTINUED] acetaminophen tablet 650 mg  650 mg Oral Q6H PRN Abbey Mayo NP   650 mg at 02/04/20 0503    [DISCONTINUED] apixaban tablet 10 mg  10 mg Oral BID Brice Perales NP   10 mg at 02/04/20 0919    [DISCONTINUED] heparin infusion 1,000 units/500 ml in 0.9% NaCl (pressure line flush)    Continuous PRN Timmy Simmons MD   1,500 mL at 02/03/20 1156    [DISCONTINUED] hydrALAZINE injection 10 mg  10 mg Intravenous Q8H PRN Abbey Mayo NP        [DISCONTINUED] levothyroxine tablet 125 mcg  125 mcg Oral Before breakfast Abbey Mayo NP   125 mcg at 02/04/20 0503    [DISCONTINUED] lisinopril tablet 10 mg  10 mg Oral Daily Brice Perales NP   10 mg at 02/04/20 0919    [DISCONTINUED] melatonin tablet 6 mg  6 mg Oral Nightly PRN Abbey Mayo NP        [DISCONTINUED] ondansetron injection 4 mg  4 mg Intravenous Q6H PRN Abbey Mayo NP        [DISCONTINUED] OXcarbazepine tablet 300 mg  300 mg Oral Nightly Abbey Mayo NP   300 mg at 02/03/20 2111    [DISCONTINUED] PHENobarbital tablet 64.8 mg  64.8 mg Oral QHS Abbey Mayo NP   64.8 mg at 02/03/20 2111    [DISCONTINUED] polyethylene glycol packet 17 g  17 g Oral BID PRN Abbey Mayo NP        [DISCONTINUED] pravastatin tablet 20 mg  20 mg Oral Daily Abbey Mayo NP   20 mg at 02/04/20 0919    [DISCONTINUED] sodium chloride 0.9% bolus 253.2 mL  3 mL/kg Intravenous Once Timmy Simomns MD        [DISCONTINUED] sodium chloride 0.9% flush 10 mL  10 mL Intravenous PRN Abbey Mayo NP           Review of patient's allergies indicates:   Allergen Reactions    Adhesive Itching and Blisters    Penicillins Anaphylaxis    Tramadol Hives    Avelox [moxifloxacin] Rash     Facial and arm itching and redness. Pt states throat closes when given.    Amoxil [amoxicillin]      Aspridrox [aspirin, buffered]     Codeine Other (See Comments)     Throat swelling    Keflex [cephalexin]      Tolerated cefepime and cefazolin    Norvasc [amlodipine]     Red dye Hives    Robitussin [guaifenesin]     Sulfa (sulfonamide antibiotics)     Tylenol [acetaminophen]      Has reaction to Tylenol with red dye and unable to take Extra Strength Tylenol/ CAN ONLY TOLERATE REG STRENGTH TYLENOL    Vicks vaporub [camphor-eucalyptus oil-menthol]        Review of Systems    Objective:      Physical Exam    Assessment:       No diagnosis found.    Plan:               There are no diagnoses linked to this encounter.

## 2020-02-05 NOTE — PROGRESS NOTES
"Subjective:       Patient ID: Annette Garcia is a 84 y.o. female.    Chief Complaint: No chief complaint on file.   ***  This is a 84 y.o.  female patient that is {Blank single:27041::"new to me.","new to me but not new to the system.","an established patient of mine."}  The patient is {Blank single:81618::"self referred","referred to me by"} *** for ***.         LAST PSA  No results found for: PSA, PSADIAG, PSATOTAL, PSAFREE    Lab Results   Component Value Date    CREATININE 0.7 02/03/2020       I personally reviewed the images: ***  No results found in the last 24 hours.      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    In the setting of septic shock and near death    Petit mal epilepsy 1954    Scoliosis of lumbar spine     Seizures     Unspecified hypothyroidism        Past Surgical History:   Procedure Laterality Date    APPENDECTOMY      BACK SURGERY  1988    vertebral fracture    BACK SURGERY  02/2013    lumbar L2-5    CATARACT EXTRACTION, BILATERAL Bilateral     CHOLECYSTECTOMY      open    EYE SURGERY Bilateral     cataract removal with lens implant    HYSTERECTOMY      PORTACATH PLACEMENT Right 09/2016    RENAL ARTERY STENT Left 07/19/2017    SIGMOIDECTOMY  10/29/2019    SMALL INTESTINE SURGERY  08/23/2016    TONSILLECTOMY      VAGINAL HYSTERECTOMY W/ ANTERIOR AND POSTERIOR VAGINAL REPAIR         Family History   Problem Relation Age of Onset    Pancreatic cancer Mother     Hypertension Father     Heart attack Father        Social History     Tobacco Use    Smoking status: Never Smoker    Smokeless tobacco: Never Used   Substance Use Topics    Alcohol use: No    Drug use: No       Current Outpatient Medications on File Prior to Visit   Medication Sig Dispense Refill    acetaminophen (TYLENOL) 325 MG tablet Take 2 " tablets (650 mg total) by mouth every 4 (four) hours as needed for Pain.  0    apixaban (ELIQUIS) 5 mg Tab Take 2 tablets (10 mg total) by mouth 2 (two) times daily for 7 days, THEN 1 tablet (5 mg total) 2 (two) times daily. 748 tablet 0    ascorbic acid, vitamin C, (VITAMIN C) 500 MG tablet Take 500 mg by mouth once daily.      calcium carbonate (OS-RICHARDSON) 600 mg calcium (1,500 mg) Tab Take 0.5 tablets (300 mg total) by mouth once daily.  0    cloNIDine (CATAPRES) 0.2 MG tablet Take 1 tablet (0.2 mg total) by mouth once daily. Hold for systolic blood pressure less than 110 180 tablet 3    levothyroxine (SYNTHROID) 125 MCG tablet TAKE 1 TABLET (125 MCG TOTAL) BY MOUTH ONCE DAILY. 90 tablet 3    lisinopril (PRINIVIL,ZESTRIL) 40 MG tablet TAKE 1 TABLET BY MOUTH EVERY DAY 90 tablet 3    magnesium oxide (MAG-OX) 400 mg (241.3 mg magnesium) tablet Take 1 tablet (400 mg total) by mouth 2 (two) times daily.  0    mirtazapine (REMERON) 7.5 MG Tab Take 1 tablet (7.5 mg total) by mouth every evening. 30 tablet 11    multivitamin (MULTIPLE VITAMINS DAILY ORAL) Take by mouth.      nutritional supplements Liqd Take 30 mLs by mouth once daily.      ondansetron (ZOFRAN) 4 MG tablet Take 1 tablet (4 mg total) by mouth every 8 (eight) hours as needed for Nausea. 25 tablet 2    OXcarbazepine (TRILEPTAL) 300 MG Tab Take 1 tablet (300 mg total) by mouth nightly. 90 tablet 3    PHENobarbital (LUMINAL) 64.8 MG tablet Take 1 tablet (64.8 mg total) by mouth every evening. 90 tablet 5    polyethylene glycol (GLYCOLAX) 17 gram PwPk Take 17 g by mouth 2 (two) times daily as needed (Constipation).  0    pravastatin (PRAVACHOL) 20 MG tablet Take 1 tablet (20 mg total) by mouth once daily. 90 tablet 3    vitamin D (VITAMIN D3) 1000 units Tab Take 1,000 Units by mouth once daily.       Current Facility-Administered Medications on File Prior to Visit   Medication Dose Route Frequency Provider Last Rate Last Dose    [COMPLETED]  heparin, porcine (PF) 100 unit/mL injection flush 500 Units  500 Units Intra-Catheter Once José Manuel Guidry MD   500 Units at 02/04/20 1503    [DISCONTINUED] acetaminophen tablet 650 mg  650 mg Oral Q6H PRN Abbey Mayo NP   650 mg at 02/04/20 0503    [DISCONTINUED] apixaban tablet 10 mg  10 mg Oral BID Brice Perales NP   10 mg at 02/04/20 0919    [DISCONTINUED] heparin infusion 1,000 units/500 ml in 0.9% NaCl (pressure line flush)    Continuous PRN Timmy Simmons MD   1,500 mL at 02/03/20 1156    [DISCONTINUED] hydrALAZINE injection 10 mg  10 mg Intravenous Q8H PRN Abbey Mayo NP        [DISCONTINUED] levothyroxine tablet 125 mcg  125 mcg Oral Before breakfast Abbey Mayo NP   125 mcg at 02/04/20 0503    [DISCONTINUED] lisinopril tablet 10 mg  10 mg Oral Daily Brice Perales NP   10 mg at 02/04/20 0919    [DISCONTINUED] melatonin tablet 6 mg  6 mg Oral Nightly PRN Abbey Mayo NP        [DISCONTINUED] ondansetron injection 4 mg  4 mg Intravenous Q6H PRN Abbey Mayo NP        [DISCONTINUED] OXcarbazepine tablet 300 mg  300 mg Oral Nightly Abbey Mayo NP   300 mg at 02/03/20 2111    [DISCONTINUED] PHENobarbital tablet 64.8 mg  64.8 mg Oral QHS Abbey Mayo NP   64.8 mg at 02/03/20 2111    [DISCONTINUED] polyethylene glycol packet 17 g  17 g Oral BID PRN Abbey Mayo NP        [DISCONTINUED] pravastatin tablet 20 mg  20 mg Oral Daily Abbey Mayo NP   20 mg at 02/04/20 0919    [DISCONTINUED] sodium chloride 0.9% bolus 253.2 mL  3 mL/kg Intravenous Once Timmy Simmons MD        [DISCONTINUED] sodium chloride 0.9% flush 10 mL  10 mL Intravenous PRN Abbey Mayo NP           Review of patient's allergies indicates:   Allergen Reactions    Adhesive Itching and Blisters    Penicillins Anaphylaxis    Tramadol Hives    Avelox [moxifloxacin] Rash     Facial and arm itching and redness. Pt states throat closes when given.    Amoxil [amoxicillin]      Aspridrox [aspirin, buffered]     Codeine Other (See Comments)     Throat swelling    Keflex [cephalexin]      Tolerated cefepime and cefazolin    Norvasc [amlodipine]     Red dye Hives    Robitussin [guaifenesin]     Sulfa (sulfonamide antibiotics)     Tylenol [acetaminophen]      Has reaction to Tylenol with red dye and unable to take Extra Strength Tylenol/ CAN ONLY TOLERATE REG STRENGTH TYLENOL    Vicks vaporub [camphor-eucalyptus oil-menthol]        Review of Systems    Objective:      Physical Exam    Assessment:       No diagnosis found.    Plan:               There are no diagnoses linked to this encounter.

## 2020-02-06 ENCOUNTER — DOCUMENTATION ONLY (OUTPATIENT)
Dept: HEPATOLOGY | Facility: HOSPITAL | Age: 85
End: 2020-02-06

## 2020-02-10 NOTE — PROGRESS NOTES
[facility]                                                                                     Skilled Nursing Facility                                                                 Progress Note      Admit Date:   02/04/2020  JESSICA   Principal Problem:   debility R/T recent left femoral DVT, s/p thrombectomy  HPI obtained from patient interview and chart review    Visit Date: 2/11/2020    Chief Complaint:  BP/HR monitoring, wound evaluation, re-evaluation of lower extremity edema and DVT treatment, PT/OT progression    HPI:   Ms. Garcia is an 84-year-old female with a past medical history of peripheral vascular disease, renovascular hypertension, renal artery stenosis status post left renal artery stent placement on 7/19/17, coronary artery disease with history of myocardial infarction, diffuse large B cell lymphoma, anemia, epilepsy, hypothyroidism, chronic hyponatremia, depression, lumbar and sacroiliac joint osteoarthritis with chronic low back pain and history of lumbar spine surgery in 2013, slow transit constipation, with hx of small bowel resection on 8/23/16.     Interval history:  /46, HR 76, stable on current cardiac regimen.  Patient now on titrated dose of Eliquis per Cardiology recommendations of 5 mg p.o. b.i.d. indefinitely.  She will follow up with Cardiology regarding recent DVT.  She continues with lower extremity edema swelling, left leg greater than right.  Her coccyx wound has now healed with aggressive wound care, no further treatment indicated. Patient continues with indwelling Martinez, which was exchanged on 02/05.  Patient has no complaints today during visit.  Will continue to monitor and treat of chronic conditions.  Patient progressing slowly with PT/OT due to chronic debility.    Past Medical History: Patient has a past medical history of Allergy, Cancer,  Disorder of kidney and ureter, E coli bacteremia (10/29/2019), Hypertension, Lone atrial fibrillation (10/30/2019), Petit mal epilepsy (1954), Scoliosis of lumbar spine, Seizures, and Unspecified hypothyroidism.    Past Surgical History: Patient has a past surgical history that includes Cholecystectomy; Appendectomy; Tonsillectomy; Vaginal hysterectomy w/ anterior and posterior vaginal repair; Cataract extraction, bilateral (Bilateral); Hysterectomy; Eye surgery (Bilateral); Portacath placement (Right, 09/2016); Back surgery (1988); Back surgery (02/2013); Renal artery stent (Left, 07/19/2017); Small intestine surgery (08/23/2016); Sigmoidectomy (10/29/2019); Thrombectomy (N/A, 2/3/2020); and Percutaneous transluminal angioplasty (PTA) of peripheral vessel (N/A, 2/3/2020).    Social History: Patient reports that she has never smoked. She has never used smokeless tobacco. She reports that she does not drink alcohol or use drugs.    Family History: family history includes Heart attack in her father; Hypertension in her father; Pancreatic cancer in her mother.    Allergies: Patient is allergic to adhesive; penicillins; tramadol; avelox [moxifloxacin]; amoxil [amoxicillin]; aspridrox [aspirin, buffered]; codeine; keflex [cephalexin]; norvasc [amlodipine]; red dye; robitussin [guaifenesin]; sulfa (sulfonamide antibiotics); tylenol [acetaminophen]; and vicks vaporub [camphor-eucalyptus oil-menthol].    ROS  Constitutional: Negative for fever or fatigue  Eyes: Negative for blurred vision, double vision and discharge.   Respiratory: Negative for cough, shortness of breath and wheezing.    Cardiovascular: Negative for chest pain, palpitations, and +leg swelling.   Gastrointestinal: Negative for abdominal pain, constipation, diarrhea, + intermittent nausea and vomiting.   Genitourinary: Negative for dysuria, frequency and urgency.   Musculoskeletal:  + generalized weakness. Negative for back pain and myalgias  Skin: Negative  for itching and rash, +stage III coccyx pressure ulcer-healed, improving   Neurological: Negative for dizziness, speech change, and headaches.   Psychiatric/Behavioral: Negative for depression. The patient is not nervous/anxious.      PEx     Constitutional: Patient appears well-developed and in no distress   Head: Normocephalic and atraumatic.   Eyes: Pupils are equal, round, and reactive to light.   Neck: Normal range of motion. Neck supple.   Cardiovascular: Normal rate, regular rhythm and normal heart sounds, + LLE edema +3.    Pulmonary/Chest: Effort normal and breath sounds are clear  Genitourinary:   indwelling Martinez  Abdominal: Soft. Bowel sounds are normal, +ostomy with brown stool noted  Musculoskeletal: Normal range of motion  Neurological: Alert and oriented to person, place, and time.   Psychiatric: Normal mood and affect. Behavior is normal  Skin: Skin is warm and dry, + stage III pressure ulcer to coccyx (previously stage II)-healed, R U chest port a cath- accessed    Wound evaluation completed on 2/11/20:    Wound location:  Stage III coccyx pressure ulcer, (previously called Stage II pressure ulcer to coccyx), healed  Date identified:  11/13/2019  Present on admit:  Yes  Appearance of wound:  Pink  Drainage characteristic:  None  Wound length:  0.0 cm  Wound width:  0.0 cm  Wound depth:0.0 cm   Alie wound area:  Irregular edges, scar tissue, normal skin tone   Following: This NP weekly- last observed on (2/11/2020), wound care RN weekly- last observed on (2/11/2020)    Assessment and Plan:    After care of recent Acute deep vein thrombosis (DVT) of femoral vein of left lower extremity, improving  -s/p Thrombectomy performed on 02/03 for persistent clot, with stent placement  -cardiologist is Dr. Simmons  -Provoked DVT in setting of immobility     -Eliquis initiated and will need OAC for the forseeable future   -continue Eliquis 10 mg p.o. b.i.d. x7 days for bolus dosing-end date 02/11, then changed  to Eliquis 5 mg p.o. b.i.d. indefinitely until follow-up with Cardiology  -2/11 continue Eliquis 5 mg p.o. b.i.d.    Debility r/t recent DVT, immobility, ongoing  - Continue with PT/OT for gait training and strengthening and restoration of ADL's   - Encourage mobility, OOB in chair, and early ambulation as appropriate  - Fall precautions   - Monitor for bowel and bladder dysfunction  - Monitor for and prevent skin breakdown and pressure ulcers  - Continue DVT prophylaxis with  Eliquis  - Pain control: Tylenol PRN  -2/11 continue PT/OT    Peripheral vascular disease, unspecified, chronic  Remote Hx of MI   HTN with Hyperlipidemia, stable  -LDL 97 11/2019  -Continue Lisinopril, Coreg ( d/anabella during admit), pravastatin, Clonidine QD  -2/11 Continue current regimen    Multiple Wounds, ongoing, resolved  1.  Stage III coccyx pressure ulcer ( previously called Stage II coccyx PU)-healed  2.R chest wall port accessed-de accessed  -Continue Vit C, prostat, HS 2 oz BID  - WC RN following closely, will follow weekly  - wound assessment completed, continue with current orders  -2/11 coccyx wound has healed, no further treatment indicated    Continued    Urinary retention, ongoing  Recent Hx of  Proteus mirabilis UTI, resolved  -Follow up with Urology as outpatient.   -Gonzales re inserted at facility on 11/15 for retention  -1/17 Tx with trimethoprim   -Continue indwelling gonzales    Decreased Appetite, ongoing  -Continue Remeron , HS 2 oz BID    Hypoalbuminemia, ongoing  Hyponatremia, ongoing  Hypophosphatemia, improved  Hypomagnesemia, improved  Hypokalemia, improved  -continue  Mg replacement  -continue ProStat, vitamin/mineral replacement    Vitamin D Deficiency, stable  -most recent vitamin-D level 16 02/2019, 22.2 1/2020  -Continue Vitamin D 3 replacement + Ca/Vit D supplement  -2/6 Most recent level 22.2, improving on supplementation    Epilepsy, chronic, stable  MDD with recurrent episodes, ongoing  -Continue home  mirtazapine 7.5mg HS.  -Continue phenobarb, Trileptal  -2/6 recent phenobarb level 12.4-low, initiate redraw to verify and may increase dose    Acquired hypothyroidism, stable  -most recent TSH 0.507 11/2019 (10.32 previously)  -Continue home levothyroxine 125 mcg before breakfast.  -2/6 TSH 4.12, will repeat after acute DVT tx resolved due to inflammation    Recent Hx of Colon necrosis, improved   S/P sigmoidectomy with ostomy placement 10/29/19  E. coli bacteremia, resolved  -Previously Treated with Keflex and metronidazole per ID until 11/15/16  -Continue ostomy care    DLBCL (diffuse large B cell lymphoma)  Anemia due to antineoplastic chemotherapy  Thrombocytopenia, stable  -most recent H&H 9/28 11/2019  -Transfused pRBCs during admit  -Patient has RU chest wall port  -Follow up with hem onc as outpatient.    Slow transit constipation, ongoing  -Continue polyethylene glycol BID (home med)        Right vocal cord paralysis with hoarse voice, resolved  -ENT consulted to during admit, recommended a CT neck and chest with IV contrast once kidney function improved  -ST following ,Chloraseptic spray PRN sore throat          Recent Hx of Right ankle pain, resolved  -12/3 x-ray of right ankle negative for fracture or abnormality, no further interventions needed      Outpatient MDs: Her primary care physician is Dr. Jose Torres. Her pain management specialist is Dr. Chirag Bates. Her neurologist is Dr. Gamal Lock.       Recent Lab work: 1/2020  TSH 4.12 high, phenobarb level 12.4 low, Vitamin D 22.2 low    Future Appointments   Date Time Provider Department Center   4/2/2020 11:00 AM CHAIR 07 [facility]         Anat Dawkins, MICHELL          Patient note was created using MModal Dictation.  Any errors in syntax or even information may not have been identified and edited on initial review prior to signing this note.

## 2020-02-11 ENCOUNTER — DOCUMENTATION ONLY (OUTPATIENT)
Dept: HEPATOLOGY | Facility: HOSPITAL | Age: 85
End: 2020-02-11

## 2020-02-12 ENCOUNTER — TELEPHONE (OUTPATIENT)
Dept: CARDIOLOGY | Facility: CLINIC | Age: 85
End: 2020-02-12

## 2020-02-12 NOTE — TELEPHONE ENCOUNTER
Reached out to [facility]     Advised about pt scheduled hospital f/u milagros date and time with Dr. Simmons

## 2020-02-12 NOTE — TELEPHONE ENCOUNTER
----- Message from Sandra Sheffield sent at 2/12/2020  9:16 AM CST -----  Pt is requesting a call back to schedule a sooner appointment than April 2020.    Pt refused appointment offer.    Pt had surgery and need's a follow up.    Please call and advise  [phone]      Thank you

## 2020-02-13 NOTE — PROGRESS NOTES
[facility]                                                                                     Skilled Nursing Facility                                                                 Progress Note      Admit Date:   02/04/2020  JESSICA   Principal Problem:   debility R/T recent left femoral DVT, s/p thrombectomy  HPI obtained from patient interview and chart review    Visit Date: 2/14/2020    Chief Complaint:  Lab review, BP/HR monitoring, wound evaluation, re-evaluation of lower extremity edema and DVT treatment, PT/OT progression    HPI:   Ms. Garcia is an 84-year-old female with a past medical history of peripheral vascular disease, renovascular hypertension, renal artery stenosis status post left renal artery stent placement on 7/19/17, coronary artery disease with history of myocardial infarction, diffuse large B cell lymphoma, anemia, epilepsy, hypothyroidism, chronic hyponatremia, depression, lumbar and sacroiliac joint osteoarthritis with chronic low back pain and history of lumbar spine surgery in 2013, slow transit constipation, with hx of small bowel resection on 8/23/16.     Interval history:  /62, HR 97, stable on current cardiac regimen.  Patient on Eliquis per Cardiology recommendations of 5 mg p.o. b.i.d. indefinitely.  She will follow up with Cardiology regarding recent DVT.  She continues with lower extremity edema swelling, left leg greater than right.  Wound evaluation completed today of new right and left heel unstageable DTI eyes that were present on admit to facility after recent hospitalization.  SMILEY RN at facility to manage wound and continue to aggressively treat.  Will continue to monitor weekly.  Patient recently had a subtherapeutic phenobarbital level of 12.1, patient on phenobarbital q.d. for history of seizure disorder, likely needs increase in dosing to  prevent seizure activity.  Patient continues with indwelling Martinez, which was exchanged on 02/05.  Patient has no complaints today during visit.  Will continue to monitor and treat of chronic conditions.  Patient progressing slowly with PT/OT due to chronic debility.    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    Past Surgical History: Patient has a past surgical history that includes Cholecystectomy; Appendectomy; Tonsillectomy; Vaginal hysterectomy w/ anterior and posterior vaginal repair; Cataract extraction, bilateral (Bilateral); Hysterectomy; Eye surgery (Bilateral); Portacath placement (Right, 09/2016); Back surgery (1988); Back surgery (02/2013); Renal artery stent (Left, 07/19/2017); Small intestine surgery (08/23/2016); Sigmoidectomy (10/29/2019); Thrombectomy (N/A, 2/3/2020); and Percutaneous transluminal angioplasty (PTA) of peripheral vessel (N/A, 2/3/2020).    Social History: Patient reports that she has never smoked. She has never used smokeless tobacco. She reports that she does not drink alcohol or use drugs.    Family History: family history includes Heart attack in her father; Hypertension in her father; Pancreatic cancer in her mother.    Allergies: Patient is allergic to adhesive; penicillins; tramadol; avelox [moxifloxacin]; amoxil [amoxicillin]; aspridrox [aspirin, buffered]; codeine; keflex [cephalexin]; norvasc [amlodipine]; red dye; robitussin [guaifenesin]; sulfa (sulfonamide antibiotics); tylenol [acetaminophen]; and vicks vaporub [camphor-eucalyptus oil-menthol].    ROS  Constitutional: Negative for fever or fatigue  Eyes: Negative for blurred vision, double vision and discharge.   Respiratory: Negative for cough, shortness of breath and  wheezing.    Cardiovascular: Negative for chest pain, palpitations, and +leg swelling.   Gastrointestinal: Negative for abdominal pain, constipation, diarrhea, + intermittent nausea and vomiting.   Genitourinary: Negative for dysuria, frequency and urgency.   Musculoskeletal:  + generalized weakness. Negative for back pain and myalgias  Skin: Negative for itching and rash, + new right/left heel unstageable DTI  Neurological: Negative for dizziness, speech change, and headaches.   Psychiatric/Behavioral: Negative for depression. The patient is not nervous/anxious.      PEx     Constitutional: Patient appears well-developed and in no distress   Head: Normocephalic and atraumatic.   Eyes: Pupils are equal, round, and reactive to light.   Neck: Normal range of motion. Neck supple.   Cardiovascular: Normal rate, regular rhythm and normal heart sounds, + LLE edema +3.    Pulmonary/Chest: Effort normal and breath sounds are clear  Genitourinary:   indwelling Martinez  Abdominal: Soft. Bowel sounds are normal, +ostomy with brown stool noted  Musculoskeletal: Normal range of motion  Neurological: Alert and oriented to person, place, and time.   Psychiatric: Normal mood and affect. Behavior is normal  Skin: Skin is warm and dry, + new right/left heel unstageable DTI    Wound evaluation completed on 2/14/20:    Wound location:  Right heel unstageable DTI, new  Date identified:  02/05/2020  Present on admit:  Yes  Appearance of wound:  Purple  Drainage characteristic:  None  Wound length:  3.5 cm  Wound width:  5.5 cm  Wound depth:0.0 cm   Alie wound area:  normal skin tone   Following: This NP weekly- last observed on (2/14/2020), wound care RN weekly- last observed on (2/14/2020)    Wound location:  Left heel unstageable DTI, new  Date identified:  02/05/2020  Present on admit:  Yes  Appearance of wound:  Purple  Drainage characteristic:  None  Wound length:  4.0 cm  Wound width:  4.0 cm  Wound depth:0.0 cm   Alie wound area:   normal skin tone   Following: This NP weekly- last observed on (2/14/2020), wound care RN weekly- last observed on (2/14/2020)    Assessment and Plan:    After care of recent Acute deep vein thrombosis (DVT) of femoral vein of left lower extremity, improving  -s/p Thrombectomy performed on 02/03 for persistent clot, with stent placement  -cardiologist is Dr. Simmons  -Provoked DVT in setting of immobility     -Eliquis initiated and will need OAC for the forseeable future   -continue Eliquis 10 mg p.o. b.i.d. x7 days for bolus dosing-end date 02/11, then changed to Eliquis 5 mg p.o. b.i.d. indefinitely until follow-up with Cardiology  -2/14 continue Eliquis 5 mg p.o. b.i.d.    Debility r/t recent DVT, immobility, ongoing  - Continue with PT/OT for gait training and strengthening and restoration of ADL's   - Encourage mobility, OOB in chair, and early ambulation as appropriate  - Fall precautions   - Monitor for bowel and bladder dysfunction  - Monitor for and prevent skin breakdown and pressure ulcers  - Continue DVT prophylaxis with  Eliquis  - Pain control: Tylenol PRN  -2/14 continue PT/OT    Peripheral vascular disease, unspecified, chronic  Remote Hx of MI   HTN with Hyperlipidemia, stable  -LDL 97 11/2019  -Continue Lisinopril, Coreg ( d/anabella during admit), pravastatin, Clonidine QD  -2/14 Continue current regimen    Multiple Wounds, new  1.  Right heel unstageable DTI, new  2.  Left heel unstageable DTI, new  -Stage III coccyx pressure ulcer ( previously called Stage II coccyx PU)-healed  -R chest wall port accessed-de accessed  -Continue Vit C, prostat, HS 2 oz BID  - WC RN following closely, will follow weekly  - wound assessment completed, continue with current orders  -2/14 continue current wound care orders    Epilepsy, chronic, stable  MDD with recurrent episodes, ongoing  -Continue home mirtazapine 7.5mg HS.  -Continue phenobarb, Trileptal  -2/6 recent phenobarb level 12.4-low, initiate redraw to verify  and may increase dose  -2/14 initiate increase in phenobarbital to 97.2 mg p.o. Q evening, initiate repeat phenobarbital level in 2 weeks, phenobarbital level 12.1, monitor for seizure activity    Continued    Urinary retention, ongoing  Recent Hx of  Proteus mirabilis UTI, resolved  -Follow up with Urology as outpatient.   -Gonzales re inserted at facility on 11/15 for retention  -1/17 Tx with trimethoprim   -Continue indwelling gonzales    Decreased Appetite, ongoing  -Continue Remeron , HS 2 oz BID    Hypoalbuminemia, ongoing  Hyponatremia, ongoing  Hypophosphatemia, improved  Hypomagnesemia, improved  Hypokalemia, improved  -continue  Mg replacement  -continue ProStat, vitamin/mineral replacement    Vitamin D Deficiency, stable  -most recent vitamin-D level 16 02/2019, 22.2 1/2020  -Continue Vitamin D 3 replacement + Ca/Vit D supplement  -2/6 Most recent level 22.2, improving on supplementation    Acquired hypothyroidism, stable  -most recent TSH 0.507 11/2019 (10.32 previously)  -Continue home levothyroxine 125 mcg before breakfast.  -2/6 TSH 4.12, will repeat after acute DVT tx resolved due to inflammation    Recent Hx of Colon necrosis, improved   S/P sigmoidectomy with ostomy placement 10/29/19  E. coli bacteremia, resolved  -Previously Treated with Keflex and metronidazole per ID until 11/15/16  -Continue ostomy care    DLBCL (diffuse large B cell lymphoma)  Anemia due to antineoplastic chemotherapy  Thrombocytopenia, stable  -most recent H&H 9/28 11/2019  -Transfused pRBCs during admit  -Patient has RU chest wall port  -Follow up with hem onc as outpatient.    Slow transit constipation, ongoing  -Continue polyethylene glycol BID (home med)        Right vocal cord paralysis with hoarse voice, resolved  -ENT consulted to during admit, recommended a CT neck and chest with IV contrast once kidney function improved  -ST following ,Chloraseptic spray PRN sore throat          Recent Hx of Right ankle pain,  resolved  -12/3 x-ray of right ankle negative for fracture or abnormality, no further interventions needed      Outpatient MDs: Her primary care physician is Dr. Jose Torres. Her pain management specialist is Dr. Chirag Bates. Her neurologist is Dr. Gamal Lock.       Recent Lab work: 1/2020  TSH 4.12 high, phenobarb level 12.4 low, Vitamin D 22.2 low    Future Appointments   Date Time Provider Department Center   3/3/2020 11:20 AM Timmy Simmons MD [facility] CARDIO [facility]   4/2/2020 11:00 AM CHAIR 07 [facility] CHEMO [facility]         Anat Dawkins NP          Patient note was created using MModal Dictation.  Any errors in syntax or even information may not have been identified and edited on initial review prior to signing this note.

## 2020-02-14 ENCOUNTER — DOCUMENTATION ONLY (OUTPATIENT)
Dept: HEPATOLOGY | Facility: HOSPITAL | Age: 85
End: 2020-02-14

## 2020-02-16 PROBLEM — R60.0 BILATERAL LEG EDEMA: Status: ACTIVE | Noted: 2020-02-16

## 2020-02-17 NOTE — PROGRESS NOTES
[facility]                                                                                     Skilled Nursing Facility                                                                 Progress Note      Admit Date:   02/04/2020  JESSICA   Principal Problem:   debility R/T recent left femoral DVT, s/p thrombectomy  HPI obtained from patient interview and chart review    Visit Date: 2/18/2020    Chief Complaint:  BP/HR monitoring, re-evaluation of lower extremity edema and DVT treatment, PT/OT progression    HPI:   Ms. Garcia is an 84-year-old female with a past medical history of peripheral vascular disease, renovascular hypertension, renal artery stenosis status post left renal artery stent placement on 7/19/17, coronary artery disease with history of myocardial infarction, diffuse large B cell lymphoma, anemia, epilepsy, hypothyroidism, chronic hyponatremia, depression, lumbar and sacroiliac joint osteoarthritis with chronic low back pain and history of lumbar spine surgery in 2013, slow transit constipation, with hx of small bowel resection on 8/23/16.     Interval history:  /64, HR 80, stable on current cardiac regimen.  Patient on Eliquis per Cardiology recommendations of 5 mg p.o. b.i.d. indefinitely.  She will follow up with Cardiology regarding recent DVT.  She continues with lower extremity edema swelling, left leg greater than right.  Patient continuing on increased phenobarbital dose from 02/14 due to subtherapeutic level found during recent visit, will monitor level intermittently.  Patient continues with indwelling Martinez, which was exchanged on 02/05.  Patient has no complaints today during visit.  Will continue to monitor and treat of chronic conditions.  Patient progressing slowly with PT/OT due to chronic debility.    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    Past Surgical History: Patient has a past surgical history that includes Cholecystectomy; Appendectomy; Tonsillectomy; Vaginal hysterectomy w/ anterior and posterior vaginal repair; Cataract extraction, bilateral (Bilateral); Hysterectomy; Eye surgery (Bilateral); Portacath placement (Right, 09/2016); Back surgery (1988); Back surgery (02/2013); Renal artery stent (Left, 07/19/2017); Small intestine surgery (08/23/2016); Sigmoidectomy (10/29/2019); Thrombectomy (N/A, 2/3/2020); and Percutaneous transluminal angioplasty (PTA) of peripheral vessel (N/A, 2/3/2020).    Social History: Patient reports that she has never smoked. She has never used smokeless tobacco. She reports that she does not drink alcohol or use drugs.    Family History: family history includes Heart attack in her father; Hypertension in her father; Pancreatic cancer in her mother.    Allergies: Patient is allergic to adhesive; penicillins; tramadol; avelox [moxifloxacin]; amoxil [amoxicillin]; aspridrox [aspirin, buffered]; codeine; keflex [cephalexin]; norvasc [amlodipine]; red dye; robitussin [guaifenesin]; sulfa (sulfonamide antibiotics); tylenol [acetaminophen]; and vicks vaporub [camphor-eucalyptus oil-menthol].    ROS  Constitutional: Negative for fever or fatigue  Eyes: Negative for blurred vision, double vision and discharge.   Respiratory: Negative for cough, shortness of breath and wheezing.    Cardiovascular: Negative for chest pain, palpitations, and +leg swelling.   Gastrointestinal: Negative for abdominal pain, constipation, diarrhea, + intermittent nausea and vomiting.   Genitourinary: Negative for dysuria, frequency and urgency.   Musculoskeletal:  + generalized weakness. Negative for back pain and  myalgias  Skin: Negative for itching and rash, + new right/left heel unstageable DTI  Neurological: Negative for dizziness, speech change, and headaches.   Psychiatric/Behavioral: Negative for depression. The patient is not nervous/anxious.      PEx     Constitutional: Patient appears well-developed and in no distress   Head: Normocephalic and atraumatic.   Eyes: Pupils are equal, round, and reactive to light.   Neck: Normal range of motion. Neck supple.   Cardiovascular: Normal rate, regular rhythm and normal heart sounds, + LLE edema +3.    Pulmonary/Chest: Effort normal and breath sounds are clear  Genitourinary:   indwelling Martinez  Abdominal: Soft. Bowel sounds are normal, +ostomy with brown stool noted  Musculoskeletal: Normal range of motion  Neurological: Alert and oriented to person, place, and time.   Psychiatric: Normal mood and affect. Behavior is normal  Skin: Skin is warm and dry, + new right/left heel unstageable DTI    Wound evaluation completed on 2/14/20:    Wound location:  Right heel unstageable DTI, new  Date identified:  02/05/2020  Present on admit:  Yes  Appearance of wound:  Purple  Drainage characteristic:  None  Wound length:  3.5 cm  Wound width:  5.5 cm  Wound depth:0.0 cm   Alie wound area:  normal skin tone   Following: This NP weekly- last observed on (2/14/2020), wound care RN weekly- last observed on (2/14/2020)    Wound location:  Left heel unstageable DTI, new  Date identified:  02/05/2020  Present on admit:  Yes  Appearance of wound:  Purple  Drainage characteristic:  None  Wound length:  4.0 cm  Wound width:  4.0 cm  Wound depth:0.0 cm   Alie wound area:  normal skin tone   Following: This NP weekly- last observed on (2/14/2020), wound care RN weekly- last observed on (2/14/2020)    Assessment and Plan:    After care of recent Acute deep vein thrombosis (DVT) of femoral vein of left lower extremity, improving  -s/p Thrombectomy performed on 02/03 for persistent clot, with stent  placement  -cardiologist is Dr. Simmons  -Provoked DVT in setting of immobility     -Eliquis initiated and will need OAC for the forseeable future   -continue Eliquis 10 mg p.o. b.i.d. x7 days for bolus dosing-end date 02/11, then changed to Eliquis 5 mg p.o. b.i.d. indefinitely until follow-up with Cardiology  -2/18 continue Eliquis 5 mg p.o. b.i.d.    Debility r/t recent DVT, immobility, ongoing  - Continue with PT/OT for gait training and strengthening and restoration of ADL's   - Encourage mobility, OOB in chair, and early ambulation as appropriate  - Fall precautions   - Monitor for bowel and bladder dysfunction  - Monitor for and prevent skin breakdown and pressure ulcers  - Continue DVT prophylaxis with  Eliquis  - Pain control: Tylenol PRN  -2/18 continue PT/OT    Peripheral vascular disease, unspecified, chronic  Remote Hx of MI   HTN with Hyperlipidemia, stable  -LDL 97 11/2019  -Continue Lisinopril, Coreg ( d/anabella during admit), pravastatin, Clonidine QD  -2/18 Continue current regimen    Epilepsy, chronic, stable  MDD with recurrent episodes, ongoing  -Continue home mirtazapine 7.5mg HS.  -Continue phenobarb, Trileptal  -2/6 recent phenobarb level 12.4-low, initiate redraw to verify and may increase dose  -2/14 initiate increase in phenobarbital to 97.2 mg p.o. Q evening, initiate repeat phenobarbital level in 2 weeks, phenobarbital level 12.1, monitor for seizure activity  -2/18 increased phenobarbital, will monitor level for efficacy    Continued    Multiple Wounds, new  1.  Right heel unstageable DTI, new  2.  Left heel unstageable DTI, new  -Stage III coccyx pressure ulcer ( previously called Stage II coccyx PU)-healed  -R chest wall port accessed-de accessed  -Continue Vit C, prostat, HS 2 oz BID  - WC RN following closely, will follow weekly  - wound assessment completed, continue with current orders  -2/14 continue current wound care orders    Urinary retention, ongoing  Recent Hx of  Proteus  mirabilis UTI, resolved  -Follow up with Urology as outpatient.   -Gonzales re inserted at facility on 11/15 for retention  -1/17 Tx with trimethoprim   -Continue indwelling gonzales    Decreased Appetite, ongoing  -Continue Remeron , HS 2 oz BID    Hypoalbuminemia, ongoing  Hyponatremia, ongoing  Hypophosphatemia, improved  Hypomagnesemia, improved  Hypokalemia, improved  -continue  Mg replacement  -continue ProStat, vitamin/mineral replacement    Vitamin D Deficiency, stable  -most recent vitamin-D level 16 02/2019, 22.2 1/2020  -Continue Vitamin D 3 replacement + Ca/Vit D supplement  -2/6 Most recent level 22.2, improving on supplementation    Acquired hypothyroidism, stable  -most recent TSH 0.507 11/2019 (10.32 previously)  -Continue home levothyroxine 125 mcg before breakfast.  -2/6 TSH 4.12, will repeat after acute DVT tx resolved due to inflammation    Recent Hx of Colon necrosis, improved   S/P sigmoidectomy with ostomy placement 10/29/19  E. coli bacteremia, resolved  -Previously Treated with Keflex and metronidazole per ID until 11/15/16  -Continue ostomy care    DLBCL (diffuse large B cell lymphoma)  Anemia due to antineoplastic chemotherapy  Thrombocytopenia, stable  -most recent H&H 9/28 11/2019  -Transfused pRBCs during admit  -Patient has RU chest wall port  -Follow up with hem onc as outpatient.    Slow transit constipation, ongoing  -Continue polyethylene glycol BID (home med)        Right vocal cord paralysis with hoarse voice, resolved  -ENT consulted to during admit, recommended a CT neck and chest with IV contrast once kidney function improved  -ST following ,Chloraseptic spray PRN sore throat          Recent Hx of Right ankle pain, resolved  -12/3 x-ray of right ankle negative for fracture or abnormality, no further interventions needed      Outpatient MDs: Her primary care physician is Dr. Jose Torres. Her pain management specialist is Dr. Chirag Bates. Her neurologist is Dr. Yeung  Ashvin.       Recent Lab work: 1/2020  TSH 4.12 high, phenobarb level 12.4 low, Vitamin D 22.2 low    Future Appointments   Date Time Provider Department Center   3/3/2020 11:20 AM Timmy Simmons MD [facility] CARDIO [facility]   4/2/2020 11:00 AM CHAIR 07 [facility] CHEMO [facility]         Anat Dawkins NP          Patient note was created using MModal Dictation.  Any errors in syntax or even information may not have been identified and edited on initial review prior to signing this note.

## 2020-02-17 NOTE — PROGRESS NOTES
Subjective:    Patient ID:  Annette Garcia is a 84 y.o. female who presents for follow-up of Peripheral Arterial Disease      HPI    83 y/o female with hx of difficult to control HTN (previously on a diuretic but dicsontinued due to recurrent dehydration), renal artery stenosis (LRA severe ostial disease s/p YOLANDA, RRA with mild-mod disease), anemia, acquired hypothyroidism, diffuse large B-cell lymphoma previously on chemo. She presents for hospital f/u.  Was hospitalized for abdominal pain, found to have bowel obstruction, had surgical intervention, developed septic shock requiring intubation, developed JAS, developed afib with sinus pauses, TVP placed, pt slowly improved, extubated, and eventually discharged to NH. Re-admitted for syncopal episode, found to be orthostatic, clinically improved, and discharged back to NH.  Currently mostly wchr bound due to debility post hospitalization. Has gonzales and following with Urology. Followed with Surgery and plan is to keep ostomy longterm.   Has new left leg edema and erythema. Leg does not hurt, but is noticeably larger that the right.   No recent dietary indiscretion. Has had prior visits admitting to dietary indiscretion with high sodium diet including Nicole's Pie, Corn bisque, and onion rings (previously was V8, canned foods, TV dinners, soft drinks, gumbo, chinese food).     Review of Systems   Constitution: Positive for malaise/fatigue.   HENT: Negative for congestion.    Eyes: Negative for blurred vision.   Cardiovascular: Positive for dyspnea on exertion and leg swelling. Negative for chest pain, claudication, cyanosis, irregular heartbeat, near-syncope, orthopnea, palpitations, paroxysmal nocturnal dyspnea and syncope.   Respiratory: Negative for shortness of breath.    Endocrine: Negative for polyuria.   Hematologic/Lymphatic: Negative for bleeding problem.   Skin: Negative for itching and rash.   Musculoskeletal: Positive for back pain, joint pain, joint  swelling and muscle weakness. Negative for muscle cramps.   Gastrointestinal: Negative for abdominal pain, hematemesis, hematochezia, melena, nausea and vomiting.   Genitourinary: Negative for dysuria and hematuria.   Neurological: Positive for weakness. Negative for dizziness, focal weakness, headaches, light-headedness and loss of balance.   Psychiatric/Behavioral: Negative for depression. The patient is not nervous/anxious.         Objective:    Physical Exam   Constitutional: She is oriented to person, place, and time. She appears well-developed and well-nourished.   HENT:   Head: Normocephalic and atraumatic.   Neck: Neck supple. No JVD present.   Cardiovascular: Normal rate, regular rhythm and normal heart sounds.   Pulses:       Carotid pulses are 2+ on the right side, and 2+ on the left side.       Radial pulses are 2+ on the right side, and 2+ on the left side.        Femoral pulses are 2+ on the right side, and 2+ on the left side.       Posterior tibial pulses are 1+ on the right side, and 1+ on the left side.   Pulmonary/Chest: Effort normal and breath sounds normal.   Abdominal: Soft. Bowel sounds are normal.   Musculoskeletal: She exhibits edema.   Neurological: She is alert and oriented to person, place, and time.   Skin: Skin is warm and dry.   Psychiatric: She has a normal mood and affect. Her behavior is normal. Thought content normal.         Assessment:       1. Bilateral leg edema    2. Paroxysmal atrial fibrillation    3. Bilateral renal artery stenosis    4. Old MI (myocardial infarction)    5. Peripheral vascular disease, unspecified    6. Renovascular hypertension    7. Sinus pause    8. Closed wedge compression fracture of eleventh thoracic vertebra with routine healing    9. Osteoarthritis of lumbar spine, unspecified spinal osteoarthritis complication status    10. Anemia due to antineoplastic chemotherapy    11. Anemia of acute infection    12. Diffuse large B-cell lymphoma, unspecified  body region    13. Acquired hypothyroidism    14. Chronic bilateral low back pain without sciatica    15. History of stent insertion of left renal artery 7/19/17    16. Nursing home resident      83 y/o pt with hx and presentation as above. Doing well from a cardiac perspective and compensated from a HF perspective. Left leg symptoms concerning for acute DVT and will refer to ED for evaluation and venous doppler. Discussed the etiology, evaluation, and management of DVT, Afib, HTN, YOLANDA, PAD, hypothyroidism, deconditioining. Discussed the importance of med compliance, heart healthy diet, and regular exercise.       Plan:       -Continue current medical management  -Refer to ED for eval for DVT  -f/u in 1 month

## 2020-02-18 ENCOUNTER — DOCUMENTATION ONLY (OUTPATIENT)
Dept: HEPATOLOGY | Facility: HOSPITAL | Age: 85
End: 2020-02-18

## 2020-02-20 NOTE — PROGRESS NOTES
[facility]                                                                                     Skilled Nursing Facility                                                                 Progress Note      Admit Date:   02/04/2020  JESSICA   Principal Problem:   debility R/T recent left femoral DVT, s/p thrombectomy  HPI obtained from patient interview and chart review    Visit Date: 2/21/2020    Chief Complaint:  Wound evaluation, BP/HR monitoring, re-evaluation of lower extremity edema and DVT treatment, PT/OT progression    HPI:   Ms. Garcia is an 84-year-old female with a past medical history of peripheral vascular disease, renovascular hypertension, renal artery stenosis status post left renal artery stent placement on 7/19/17, coronary artery disease with history of myocardial infarction, diffuse large B cell lymphoma, anemia, epilepsy, hypothyroidism, chronic hyponatremia, depression, lumbar and sacroiliac joint osteoarthritis with chronic low back pain and history of lumbar spine surgery in 2013, slow transit constipation, with hx of small bowel resection on 8/23/16.     Interval history:  Wound evaluation completed today of right and left heel unstageable DTI s that were present on admit to facility after recent hospitalization.  L DTI has resolved, patient continues with R heel DTI. WC RN at facility to manage wound and continue to aggressively treat.  Will continue to monitor weekly. /62, HR 83, most recent weight 138.4 lb, stable on current cardiac regimen.  Patient on Eliquis per Cardiology recommendations of 5 mg p.o. b.i.d. indefinitely for lower extremity DVT.  She will follow up with Cardiology regarding recent DVT.  She continues with lower extremity edema swelling, left leg greater than right.  Patient continuing on increased phenobarbital dose from 02/14 due to  subtherapeutic level found during recent visit, will monitor level intermittently.  Patient continues with indwelling Martinez, which was exchanged on 02/05.  Patient has no complaints today during visit.  Will continue to monitor and treat of chronic conditions.  Patient progressing slowly with PT/OT due to chronic debility.    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    Past Surgical History: Patient has a past surgical history that includes Cholecystectomy; Appendectomy; Tonsillectomy; Vaginal hysterectomy w/ anterior and posterior vaginal repair; Cataract extraction, bilateral (Bilateral); Hysterectomy; Eye surgery (Bilateral); Portacath placement (Right, 09/2016); Back surgery (1988); Back surgery (02/2013); Renal artery stent (Left, 07/19/2017); Small intestine surgery (08/23/2016); Sigmoidectomy (10/29/2019); Thrombectomy (N/A, 2/3/2020); and Percutaneous transluminal angioplasty (PTA) of peripheral vessel (N/A, 2/3/2020).    Social History: Patient reports that she has never smoked. She has never used smokeless tobacco. She reports that she does not drink alcohol or use drugs.    Family History: family history includes Heart attack in her father; Hypertension in her father; Pancreatic cancer in her mother.    Allergies: Patient is allergic to adhesive; penicillins; tramadol; avelox [moxifloxacin]; amoxil [amoxicillin]; aspridrox [aspirin, buffered]; codeine; keflex [cephalexin]; norvasc [amlodipine]; red dye; robitussin [guaifenesin]; sulfa (sulfonamide antibiotics); tylenol [acetaminophen]; and vicks vaporub [camphor-eucalyptus oil-menthol].    ROS  Constitutional: Negative for fever or fatigue  Eyes: Negative for blurred vision, double vision and discharge.    Respiratory: Negative for cough, shortness of breath and wheezing.    Cardiovascular: Negative for chest pain, palpitations, and +leg swelling.   Gastrointestinal: Negative for abdominal pain, constipation, diarrhea, + intermittent nausea and vomiting.   Genitourinary: Negative for dysuria, frequency and urgency.   Musculoskeletal:  + generalized weakness. Negative for back pain and myalgias  Skin: Negative for itching and rash, + new right heel unstageable DTI, (L heel resolved)  Neurological: Negative for dizziness, speech change, and headaches.   Psychiatric/Behavioral: Negative for depression. The patient is not nervous/anxious.      PEx     Constitutional: Patient appears well-developed and in no distress   Head: Normocephalic and atraumatic.   Eyes: Pupils are equal, round, and reactive to light.   Neck: Normal range of motion. Neck supple.   Cardiovascular: Normal rate, regular rhythm and normal heart sounds, + LLE edema +3.    Pulmonary/Chest: Effort normal and breath sounds are clear  Genitourinary:   indwelling Martinez  Abdominal: Soft. Bowel sounds are normal, +ostomy with brown stool noted  Musculoskeletal: Normal range of motion  Neurological: Alert and oriented to person, place, and time.   Psychiatric: Normal mood and affect. Behavior is normal  Skin: Skin is warm and dry, + right heel unstageable DTI, (L heel resolved)    Wound evaluation completed on 2/21/20:    Wound location:  Right heel unstageable DTI, unchanged  Date identified:  02/05/2020  Present on admit:  Yes  Appearance of wound:  Purple  Drainage characteristic:  None  Wound length:  3.5 cm  Wound width:  5.5 cm  Wound depth:0.0 cm   Alie wound area:  normal skin tone   Following: This NP weekly- last observed on (2/21/2020), wound care RN weekly- last observed on (2/21/2020)    Wound location:  Left heel unstageable DTI, resolved  Date identified:  02/05/2020  Present on admit:  Yes  Appearance of wound:  Purple  Drainage  characteristic:  None  Wound length:  0 cm  Wound width:  0 cm  Wound depth: 0 cm   Alie wound area:  normal skin tone   Following: This NP weekly- last observed on (2/21/2020), wound care RN weekly- last observed on (2/21/2020)    Assessment and Plan:    After care of recent Acute deep vein thrombosis (DVT) of femoral vein of left lower extremity, improving  -s/p Thrombectomy performed on 02/03 for persistent clot, with stent placement  -cardiologist is Dr. Simmons  -Provoked DVT in setting of immobility     -Eliquis initiated and will need OAC for the forseeable future   -continue Eliquis 10 mg p.o. b.i.d. x7 days for bolus dosing-end date 02/11, then changed to Eliquis 5 mg p.o. b.i.d. indefinitely until follow-up with Cardiology  -2/21 continue Eliquis 5 mg p.o. b.i.d.    Debility r/t recent DVT, immobility, ongoing  - Continue with PT/OT for gait training and strengthening and restoration of ADL's   - Encourage mobility, OOB in chair, and early ambulation as appropriate  - Fall precautions   - Monitor for bowel and bladder dysfunction  - Monitor for and prevent skin breakdown and pressure ulcers  - Continue DVT prophylaxis with  Eliquis  - Pain control: Tylenol PRN  -2/21 continue PT/OT    Peripheral vascular disease, unspecified, chronic  Remote Hx of MI   HTN with Hyperlipidemia, stable  -LDL 97 11/2019  -Continue Lisinopril, Coreg ( d/anabella during admit), pravastatin, Clonidine QD  -2/21 Continue current regimen    Epilepsy, chronic, stable  MDD with recurrent episodes, ongoing  -Continue home mirtazapine 7.5mg HS.  -Continue phenobarb, Trileptal  -2/6 recent phenobarb level 12.4-low, initiate redraw to verify and may increase dose  -2/14 initiate increase in phenobarbital to 97.2 mg p.o. Q evening, initiate repeat phenobarbital level in 2 weeks, phenobarbital level 12.1, monitor for seizure activity  -2/21 continue increased phenobarbital, will monitor level for efficacy    Multiple Wounds, new  1.  Right  heel unstageable DTI, new  2.  Left heel unstageable DTI, resolved  -Stage III coccyx pressure ulcer ( previously called Stage II coccyx PU)-healed  -R chest wall port accessed-de accessed  -Continue Vit C, prostat, HS 2 oz BID  - SMILEY RN following closely, will follow weekly  - wound assessment completed, continue with current orders  -2/21 continue current wound care orders    Continued    Urinary retention, ongoing  Recent Hx of  Proteus mirabilis UTI, resolved  -Follow up with Urology as outpatient.   -Gonzales re inserted at facility on 11/15 for retention  -1/17 Tx with trimethoprim   -Continue indwelling gonzales    Decreased Appetite, ongoing  -Continue Remeron , HS 2 oz BID    Hypoalbuminemia, ongoing  Hyponatremia, ongoing  Hypophosphatemia, improved  Hypomagnesemia, improved  Hypokalemia, improved  -continue  Mg replacement  -continue ProStat, vitamin/mineral replacement    Vitamin D Deficiency, stable  -most recent vitamin-D level 16 02/2019, 22.2 1/2020  -Continue Vitamin D 3 replacement + Ca/Vit D supplement  -2/6 Most recent level 22.2, improving on supplementation    Acquired hypothyroidism, stable  -most recent TSH 0.507 11/2019 (10.32 previously)  -Continue home levothyroxine 125 mcg before breakfast.  -2/6 TSH 4.12, will repeat after acute DVT tx resolved due to inflammation    Recent Hx of Colon necrosis, improved   S/P sigmoidectomy with ostomy placement 10/29/19  E. coli bacteremia, resolved  -Previously Treated with Keflex and metronidazole per ID until 11/15/16  -Continue ostomy care    DLBCL (diffuse large B cell lymphoma)  Anemia due to antineoplastic chemotherapy  Thrombocytopenia, stable  -most recent H&H 9/28 11/2019  -Transfused pRBCs during admit  -Patient has RU chest wall port  -Follow up with hem onc as outpatient.    Slow transit constipation, ongoing  -Continue polyethylene glycol BID (home med)        Right vocal cord paralysis with hoarse voice, resolved  -ENT consulted to during admit,  recommended a CT neck and chest with IV contrast once kidney function improved  -ST following ,Chloraseptic spray PRN sore throat          Recent Hx of Right ankle pain, resolved  -12/3 x-ray of right ankle negative for fracture or abnormality, no further interventions needed      Outpatient MDs: Her primary care physician is Dr. Jose Torres. Her pain management specialist is Dr. Chirag Bates. Her neurologist is Dr. Gamal Lock.       Recent Lab work: 1/2020  TSH 4.12 high, phenobarb level 12.4 low, Vitamin D 22.2 low    Future Appointments   Date Time Provider Department Center   2/28/2020 11:00 AM Ro Dumont NP [facility] UROLOGY [facility]   3/3/2020 11:20 AM Timmy Simmons MD [facility] CARDIO [facility]   4/2/2020 11:00 AM  07 [facility] CHEMO [facility]            Anat Dawkins NP          Patient note was created using MModal Dictation.  Any errors in syntax or even information may not have been identified and edited on initial review prior to signing this note.

## 2020-02-21 ENCOUNTER — DOCUMENTATION ONLY (OUTPATIENT)
Dept: HEPATOLOGY | Facility: HOSPITAL | Age: 85
End: 2020-02-21

## 2020-02-27 NOTE — PROGRESS NOTES
[facility]                                                                                     Skilled Nursing Facility                                                                 Progress Note      Admit Date:   02/04/2020  JESSICA   Principal Problem:   debility R/T recent left femoral DVT, s/p thrombectomy  HPI obtained from patient interview and chart review    Visit Date: 2/28/2020    Chief Complaint:  Wound evaluation, BP/HR monitoring, re-evaluation of lower extremity edema and DVT treatment, PT/OT progression    HPI:   Ms. Garcia is an 84-year-old female with a past medical history of peripheral vascular disease, renovascular hypertension, renal artery stenosis status post left renal artery stent placement on 7/19/17, coronary artery disease with history of myocardial infarction, diffuse large B cell lymphoma, anemia, epilepsy, hypothyroidism, chronic hyponatremia, depression, lumbar and sacroiliac joint osteoarthritis with chronic low back pain and history of lumbar spine surgery in 2013, slow transit constipation, with hx of small bowel resection on 8/23/16.     Interval history:  Wound evaluation completed today of right heel unstageable DTI that was present on admit to facility after recent hospitalization. WC RN at facility to manage wound and continue to aggressively treat.  Will continue to monitor weekly. /66, HR 90, stable on current cardiac regimen.  Patient on Eliquis per Cardiology recommendations of 5 mg p.o. b.i.d. indefinitely for lower extremity DVT.  She will follow up with Cardiology regarding recent DVT on 03/03.  She continues with lower extremity edema swelling, left leg greater than right.  Patient continuing on increased phenobarbital dose from 02/14 due to subtherapeutic level found during recent visit, will monitor level intermittently.  Patient continues  with indwelling Martinez, which was exchanged on 02/05 next urology appointment scheduled for today.  Patient has no complaints today during visit.  Will continue to monitor and treat of chronic conditions.  Patient progressing slowly with PT/OT due to chronic debility.    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    Past Surgical History: Patient has a past surgical history that includes Cholecystectomy; Appendectomy; Tonsillectomy; Vaginal hysterectomy w/ anterior and posterior vaginal repair; Cataract extraction, bilateral (Bilateral); Hysterectomy; Eye surgery (Bilateral); Portacath placement (Right, 09/2016); Back surgery (1988); Back surgery (02/2013); Renal artery stent (Left, 07/19/2017); Small intestine surgery (08/23/2016); Sigmoidectomy (10/29/2019); Thrombectomy (N/A, 2/3/2020); and Percutaneous transluminal angioplasty (PTA) of peripheral vessel (N/A, 2/3/2020).    Social History: Patient reports that she has never smoked. She has never used smokeless tobacco. She reports that she does not drink alcohol or use drugs.    Family History: family history includes Heart attack in her father; Hypertension in her father; Pancreatic cancer in her mother.    Allergies: Patient is allergic to adhesive; penicillins; tramadol; avelox [moxifloxacin]; amoxil [amoxicillin]; aspridrox [aspirin, buffered]; codeine; keflex [cephalexin]; norvasc [amlodipine]; red dye; robitussin [guaifenesin]; sulfa (sulfonamide antibiotics); tylenol [acetaminophen]; and vicks vaporub [camphor-eucalyptus oil-menthol].    ROS  Constitutional: Negative for fever or fatigue  Eyes: Negative for blurred vision, double vision and discharge.   Respiratory: Negative for cough, shortness of breath and  wheezing.    Cardiovascular: Negative for chest pain, palpitations, and +leg swelling.   Gastrointestinal: Negative for abdominal pain, constipation, diarrhea, + intermittent nausea and vomiting.   Genitourinary: Negative for dysuria, frequency and urgency.   Musculoskeletal:  + generalized weakness. Negative for back pain and myalgias  Skin: Negative for itching and rash, + new right heel unstageable DTI  Neurological: Negative for dizziness, speech change, and headaches.   Psychiatric/Behavioral: Negative for depression. The patient is not nervous/anxious.      PEx     Constitutional: Patient appears well-developed and in no distress   Head: Normocephalic and atraumatic.   Eyes: Pupils are equal, round, and reactive to light.   Neck: Normal range of motion. Neck supple.   Cardiovascular: Normal rate, regular rhythm and normal heart sounds, + LLE edema +3.    Pulmonary/Chest: Effort normal and breath sounds are clear  Genitourinary:   indwelling Martinez  Abdominal: Soft. Bowel sounds are normal, +ostomy with brown stool noted  Musculoskeletal: Normal range of motion  Neurological: Alert and oriented to person, place, and time.   Psychiatric: Normal mood and affect. Behavior is normal  Skin: Skin is warm and dry, + right heel unstageable DTI    Wound evaluation completed on 2/28/20:    Wound location:  Right heel unstageable DTI, improving  Date identified:  02/05/2020  Present on admit:  Yes  Appearance of wound:  Purple  Drainage characteristic:  None  Wound length:  3.0 cm  Wound width:  4.5 cm  Wound depth:0.0 cm   Alie wound area:  normal skin tone   Following: This NP weekly- last observed on (2/28/2020), wound care RN weekly- last observed on (2/28/2020)      Assessment and Plan:    After care of recent Acute deep vein thrombosis (DVT) of femoral vein of left lower extremity, improving  -s/p Thrombectomy performed on 02/03 for persistent clot, with stent placement  -cardiologist is Dr. Simmons  -Provoked DVT in  setting of immobility     -Eliquis initiated and will need OAC for the forseeable future   -continue Eliquis 10 mg p.o. b.i.d. x7 days for bolus dosing-end date 02/11, then changed to Eliquis 5 mg p.o. b.i.d. indefinitely until follow-up with Cardiology  -2/28 continue Eliquis 5 mg p.o. b.i.d.    Debility r/t recent DVT, immobility, ongoing  - Continue with PT/OT for gait training and strengthening and restoration of ADL's   - Encourage mobility, OOB in chair, and early ambulation as appropriate  - Fall precautions   - Monitor for bowel and bladder dysfunction  - Monitor for and prevent skin breakdown and pressure ulcers  - Continue DVT prophylaxis with  Eliquis  - Pain control: Tylenol PRN  -2/21 continue PT/OT    Peripheral vascular disease, unspecified, chronic  Remote Hx of MI   HTN with Hyperlipidemia, stable  -LDL 97 11/2019  -Continue Lisinopril, Coreg ( d/anabella during admit), pravastatin, Clonidine QD  -2/28 Continue current regimen    Epilepsy, chronic, stable  MDD with recurrent episodes, ongoing  -Continue home mirtazapine 7.5mg HS.  -Continue phenobarb, Trileptal  -2/6 recent phenobarb level 12.4-low, initiate redraw to verify and may increase dose  -2/14 initiate increase in phenobarbital to 97.2 mg p.o. Q evening, initiate repeat phenobarbital level in 2 weeks, phenobarbital level 12.1, monitor for seizure activity  -2/28 continue increased phenobarbital, will monitor level for efficacy    1.  Right heel unstageable DTI, improving  -R chest wall port accessed-de accessed  -Continue Vit C, prostat, HS 2 oz BID  - WC RN following closely, will follow weekly  - wound assessment completed, continue with current orders  -2/28 continue current wound care orders    Continued    Urinary retention, ongoing  Recent Hx of  Proteus mirabilis UTI, resolved  -Follow up with Urology as outpatient.   -Martinez re inserted at facility on 11/15 for retention  -1/17 Tx with trimethoprim   -Continue indwelling  janet    Decreased Appetite, ongoing  -Continue Remeron , HS 2 oz BID    Hypoalbuminemia, ongoing  Hyponatremia, ongoing  Hypophosphatemia, improved  Hypomagnesemia, improved  Hypokalemia, improved  -continue  Mg replacement  -continue ProStat, vitamin/mineral replacement    Vitamin D Deficiency, stable  -most recent vitamin-D level 16 02/2019, 22.2 1/2020  -Continue Vitamin D 3 replacement + Ca/Vit D supplement  -2/6 Most recent level 22.2, improving on supplementation    Acquired hypothyroidism, stable  -most recent TSH 0.507 11/2019 (10.32 previously)  -Continue home levothyroxine 125 mcg before breakfast.  -2/6 TSH 4.12, will repeat after acute DVT tx resolved due to inflammation    Recent Hx of Colon necrosis, improved   S/P sigmoidectomy with ostomy placement 10/29/19  E. coli bacteremia, resolved  -Previously Treated with Keflex and metronidazole per ID until 11/15/16  -Continue ostomy care    DLBCL (diffuse large B cell lymphoma)  Anemia due to antineoplastic chemotherapy  Thrombocytopenia, stable  -most recent H&H 9/28 11/2019  -Transfused pRBCs during admit  -Patient has RU chest wall port  -Follow up with hem onc as outpatient.    Slow transit constipation, ongoing  -Continue polyethylene glycol BID (home med)        Right vocal cord paralysis with hoarse voice, resolved  -ENT consulted to during admit, recommended a CT neck and chest with IV contrast once kidney function improved  -ST following ,Chloraseptic spray PRN sore throat          Recent Hx of Right ankle pain, resolved  -12/3 x-ray of right ankle negative for fracture or abnormality, no further interventions needed      Outpatient MDs: Her primary care physician is Dr. Jose Torres. Her pain management specialist is Dr. Chirag Bates. Her neurologist is Dr. Gamal Lock.       Recent Lab work: 1/2020  TSH 4.12 high, phenobarb level 12.4 low, Vitamin D 22.2 low    Future Appointments   Date Time Provider Department Center   2/28/2020  11:00 AM Ro Dmuont NP [facility] UROLOGY [facility]   3/3/2020 11:20 AM Timmy Simmons MD [facility] CARDIO [place] [facility]   4/2/2020 11:00 AM CHAIR 07 [facility] CHEMO [facility] NP          Patient note was created using MModal Dictation.  Any errors in syntax or even information may not have been identified and edited on initial review prior to signing this note.

## 2020-02-28 ENCOUNTER — OFFICE VISIT (OUTPATIENT)
Dept: UROLOGY | Facility: CLINIC | Age: 85
End: 2020-02-28
Payer: MEDICARE

## 2020-02-28 ENCOUNTER — DOCUMENTATION ONLY (OUTPATIENT)
Dept: HEPATOLOGY | Facility: HOSPITAL | Age: 85
End: 2020-02-28

## 2020-02-28 VITALS — TEMPERATURE: 98 F | DIASTOLIC BLOOD PRESSURE: 77 MMHG | SYSTOLIC BLOOD PRESSURE: 127 MMHG

## 2020-02-28 DIAGNOSIS — R33.9 URINARY RETENTION: Primary | ICD-10-CM

## 2020-02-28 DIAGNOSIS — Z97.8 FOLEY CATHETER IN PLACE: ICD-10-CM

## 2020-02-28 PROCEDURE — 99999 PR PBB SHADOW E&M-EST. PATIENT-LVL III: ICD-10-PCS | Mod: PBBFAC,,, | Performed by: NURSE PRACTITIONER

## 2020-02-28 PROCEDURE — 99213 PR OFFICE/OUTPT VISIT, EST, LEVL III, 20-29 MIN: ICD-10-PCS | Mod: S$PBB,25,, | Performed by: NURSE PRACTITIONER

## 2020-02-28 PROCEDURE — 99999 PR PBB SHADOW E&M-EST. PATIENT-LVL III: CPT | Mod: PBBFAC,,, | Performed by: NURSE PRACTITIONER

## 2020-02-28 PROCEDURE — 51700 PR IRRIGATION, BLADDER: ICD-10-PCS | Mod: S$PBB,,, | Performed by: NURSE PRACTITIONER

## 2020-02-28 PROCEDURE — 99213 OFFICE O/P EST LOW 20 MIN: CPT | Mod: PBBFAC,PO,25 | Performed by: NURSE PRACTITIONER

## 2020-02-28 PROCEDURE — 51700 IRRIGATION OF BLADDER: CPT | Mod: S$PBB,,, | Performed by: NURSE PRACTITIONER

## 2020-02-28 PROCEDURE — 51700 IRRIGATION OF BLADDER: CPT | Mod: PBBFAC,PO | Performed by: NURSE PRACTITIONER

## 2020-02-28 PROCEDURE — 99213 OFFICE O/P EST LOW 20 MIN: CPT | Mod: S$PBB,25,, | Performed by: NURSE PRACTITIONER

## 2020-02-28 NOTE — PATIENT INSTRUCTIONS
- Please let the staff know if you start to experience any bladder fullness/pain or are unable to urinate in 6 hours. Will have the staff bladder scan you and possibly re-place gonzales if necessary.

## 2020-02-28 NOTE — PROGRESS NOTES
Subjective:       Patient ID: Annette Garcia is a 84 y.o. female.    Chief Complaint: Follow-up (Voiding trail)       This is a 84 y.o.  female patient that is an established patient of mine.  The patient is here to follow-up on voiding trial discussion. 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 She last saw Dr. Estrada on 1/10/2020, patient elected to have the gonzales exchanged and not removed. Culture at that time showed PROTEUS MIRABILIS 10,000 - 49,999 cfu/ml and she was started on Trimethoprim x 5 days.     She presents to clinic today with her daughter for a voiding trial. Catheter has been draining well. No issues. No constipation as patient has ostomy. She has started to ambulate with PT assistance. She feels she is ready for a voiding trial. Denies fevers, dysuria or hematuria.     Lab Results   Component Value Date    CREATININE 0.7 02/03/2020     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    In the setting of septic shock and near death    Petit mal epilepsy 1954    Scoliosis of lumbar spine     Seizures     Unspecified hypothyroidism        Past Surgical History:   Procedure Laterality Date    APPENDECTOMY      BACK SURGERY  1988    vertebral fracture    BACK SURGERY  02/2013    lumbar L2-5    CATARACT EXTRACTION, BILATERAL Bilateral     CHOLECYSTECTOMY      open    EYE SURGERY Bilateral     cataract removal with lens implant    HYSTERECTOMY      PERCUTANEOUS TRANSLUMINAL ANGIOPLASTY (PTA) OF PERIPHERAL VESSEL N/A 2/3/2020    Procedure: PTA, PERIPHERAL VESSEL;  Surgeon: Timmy Simmons MD;  Location[facility] CATH LAB/EP;  Service:  Cardiology;  Laterality: N/A;    PORTACATH PLACEMENT Right 09/2016    RENAL ARTERY STENT Left 07/19/2017    SIGMOIDECTOMY  10/29/2019    SMALL INTESTINE SURGERY  08/23/2016    THROMBECTOMY N/A 2/3/2020    Procedure: THROMBECTOMY;  Surgeon: Timmy Simmons MD;  Location: [facility] CATH LAB/EP;  Service: Cardiology;  Laterality: N/A;    TONSILLECTOMY      VAGINAL HYSTERECTOMY W/ ANTERIOR AND POSTERIOR VAGINAL REPAIR         Family History   Problem Relation Age of Onset    Pancreatic cancer Mother     Hypertension Father     Heart attack Father        Social History     Tobacco Use    Smoking status: Never Smoker    Smokeless tobacco: Never Used   Substance Use Topics    Alcohol use: No    Drug use: No       Current Outpatient Medications on File Prior to Visit   Medication Sig Dispense Refill    acetaminophen (TYLENOL) 325 MG tablet Take 2 tablets (650 mg total) by mouth every 4 (four) hours as needed for Pain.  0    apixaban (ELIQUIS) 5 mg Tab Take 2 tablets (10 mg total) by mouth 2 (two) times daily for 7 days, THEN 1 tablet (5 mg total) 2 (two) times daily. 748 tablet 0    ascorbic acid, vitamin C, (VITAMIN C) 500 MG tablet Take 500 mg by mouth once daily.      calcium carbonate (OS-RICHARDSON) 600 mg calcium (1,500 mg) Tab Take 0.5 tablets (300 mg total) by mouth once daily.  0    cloNIDine (CATAPRES) 0.2 MG tablet Take 1 tablet (0.2 mg total) by mouth once daily. Hold for systolic blood pressure less than 110 180 tablet 3    levothyroxine (SYNTHROID) 125 MCG tablet TAKE 1 TABLET (125 MCG TOTAL) BY MOUTH ONCE DAILY. 90 tablet 3    lisinopril (PRINIVIL,ZESTRIL) 40 MG tablet TAKE 1 TABLET BY MOUTH EVERY DAY 90 tablet 3    magnesium oxide (MAG-OX) 400 mg (241.3 mg magnesium) tablet Take 1 tablet (400 mg total) by mouth 2 (two) times daily.  0    mirtazapine (REMERON) 7.5 MG Tab Take 1 tablet (7.5 mg total) by mouth every evening. 30 tablet 11    multivitamin (MULTIPLE VITAMINS DAILY ORAL) Take by  mouth.      nutritional supplements Liqd Take 30 mLs by mouth once daily.      ondansetron (ZOFRAN) 4 MG tablet Take 1 tablet (4 mg total) by mouth every 8 (eight) hours as needed for Nausea. 25 tablet 2    OXcarbazepine (TRILEPTAL) 300 MG Tab Take 1 tablet (300 mg total) by mouth nightly. 90 tablet 3    PHENobarbital (LUMINAL) 64.8 MG tablet Take 1 tablet (64.8 mg total) by mouth every evening. 90 tablet 5    polyethylene glycol (GLYCOLAX) 17 gram PwPk Take 17 g by mouth 2 (two) times daily as needed (Constipation).  0    pravastatin (PRAVACHOL) 20 MG tablet Take 1 tablet (20 mg total) by mouth once daily. 90 tablet 3    vitamin D (VITAMIN D3) 1000 units Tab Take 1,000 Units by mouth once daily.       No current facility-administered medications on file prior to visit.        Review of patient's allergies indicates:   Allergen Reactions    Adhesive Itching and Blisters    Penicillins Anaphylaxis    Tramadol Hives    Avelox [moxifloxacin] Rash     Facial and arm itching and redness. Pt states throat closes when given.    Amoxil [amoxicillin]     Aspridrox [aspirin, buffered]     Codeine Other (See Comments)     Throat swelling    Keflex [cephalexin]      Tolerated cefepime and cefazolin    Norvasc [amlodipine]     Red dye Hives    Robitussin [guaifenesin]     Sulfa (sulfonamide antibiotics)     Tylenol [acetaminophen]      Has reaction to Tylenol with red dye and unable to take Extra Strength Tylenol/ CAN ONLY TOLERATE REG STRENGTH TYLENOL    Vicks vaporub [camphor-eucalyptus oil-menthol]        Review of Systems   Constitutional: Negative for activity change.   HENT: Negative for congestion.    Eyes: Negative for visual disturbance.   Respiratory: Negative for shortness of breath.    Cardiovascular: Negative for chest pain.   Gastrointestinal: Negative for abdominal distention.   Genitourinary: Negative for decreased urine volume and dysuria.   Musculoskeletal: Negative for gait problem.    Skin: Negative for color change.   Neurological: Negative for dizziness.   Psychiatric/Behavioral: Negative for agitation.       Objective:      Physical Exam   Constitutional: She is oriented to person, place, and time. She appears well-developed.   HENT:   Head: Normocephalic and atraumatic.   Eyes: EOM are normal.   Neck: Normal range of motion.   Cardiovascular: Intact distal pulses.   Pulmonary/Chest: Effort normal.   Abdominal: Soft. She exhibits no distension. There is no tenderness.   Genitourinary:   Genitourinary Comments: Catheter in place draining yellow urine    Neurological: She is alert and oriented to person, place, and time.   Skin: Skin is warm and dry.   Psychiatric: She has a normal mood and affect.       Assessment:       1. Urinary retention    2. Gonzales catheter in place        Plan:       1. 200 mL of sterile saline was instilled in bladder, balloon was deflated and catheter removed, pt tolerated well. Pt was able to urinate 200mL of urine without difficulty. Voiding trial successful. Since she is at nursing home, wrote directions that if she experiences bladder pressure/pain, decrease flow, straining/difficulty urinating or has not urinated again within 6 hours, to bladder scan patient. If bladder scan > 350mL, place gonzales. If she starts to develop any UTI symptoms they will obtain urine culture and let us know. Will have patient follow-up in clinic next week for PVR or sooner if any issues. Patient and daughter verbalized understanding.     Urinary retention    Gonzales catheter in place

## 2020-03-03 ENCOUNTER — OFFICE VISIT (OUTPATIENT)
Dept: CARDIOLOGY | Facility: CLINIC | Age: 85
End: 2020-03-03
Payer: MEDICARE

## 2020-03-03 VITALS
OXYGEN SATURATION: 87 % | HEART RATE: 78 BPM | DIASTOLIC BLOOD PRESSURE: 80 MMHG | HEIGHT: 57 IN | BODY MASS INDEX: 40.25 KG/M2 | SYSTOLIC BLOOD PRESSURE: 126 MMHG

## 2020-03-03 DIAGNOSIS — Z98.890 S/P EXPLORATORY LAPAROTOMY: ICD-10-CM

## 2020-03-03 DIAGNOSIS — I15.0 RENOVASCULAR HYPERTENSION: Chronic | ICD-10-CM

## 2020-03-03 DIAGNOSIS — I82.4Y9 DEEP VEIN THROMBOSIS (DVT) OF PROXIMAL LOWER EXTREMITY, UNSPECIFIED CHRONICITY, UNSPECIFIED LATERALITY: Primary | ICD-10-CM

## 2020-03-03 DIAGNOSIS — S22.080D CLOSED WEDGE COMPRESSION FRACTURE OF ELEVENTH THORACIC VERTEBRA WITH ROUTINE HEALING: ICD-10-CM

## 2020-03-03 DIAGNOSIS — C83.30 DIFFUSE LARGE B-CELL LYMPHOMA, UNSPECIFIED BODY REGION: Chronic | ICD-10-CM

## 2020-03-03 DIAGNOSIS — I25.2 OLD MI (MYOCARDIAL INFARCTION): Chronic | ICD-10-CM

## 2020-03-03 DIAGNOSIS — D64.9 ANEMIA OF ACUTE INFECTION: ICD-10-CM

## 2020-03-03 DIAGNOSIS — T45.1X5A ANEMIA DUE TO ANTINEOPLASTIC CHEMOTHERAPY: Chronic | ICD-10-CM

## 2020-03-03 DIAGNOSIS — I87.1 MAY-THURNER SYNDROME: ICD-10-CM

## 2020-03-03 DIAGNOSIS — R60.0 BILATERAL LEG EDEMA: ICD-10-CM

## 2020-03-03 DIAGNOSIS — Z98.890 HISTORY OF STENT INSERTION OF RENAL ARTERY: Chronic | ICD-10-CM

## 2020-03-03 DIAGNOSIS — E03.9 ACQUIRED HYPOTHYROIDISM: Chronic | ICD-10-CM

## 2020-03-03 DIAGNOSIS — I45.5 SINUS PAUSE: ICD-10-CM

## 2020-03-03 DIAGNOSIS — D64.81 ANEMIA DUE TO ANTINEOPLASTIC CHEMOTHERAPY: Chronic | ICD-10-CM

## 2020-03-03 DIAGNOSIS — M47.816 OSTEOARTHRITIS OF LUMBAR SPINE, UNSPECIFIED SPINAL OSTEOARTHRITIS COMPLICATION STATUS: Chronic | ICD-10-CM

## 2020-03-03 DIAGNOSIS — I48.0 PAROXYSMAL ATRIAL FIBRILLATION: Chronic | ICD-10-CM

## 2020-03-03 DIAGNOSIS — I70.1 BILATERAL RENAL ARTERY STENOSIS: ICD-10-CM

## 2020-03-03 PROCEDURE — 99999 PR PBB SHADOW E&M-EST. PATIENT-LVL III: CPT | Mod: PBBFAC,,, | Performed by: INTERNAL MEDICINE

## 2020-03-03 PROCEDURE — 99213 OFFICE O/P EST LOW 20 MIN: CPT | Mod: PBBFAC,PO | Performed by: INTERNAL MEDICINE

## 2020-03-03 PROCEDURE — 99214 OFFICE O/P EST MOD 30 MIN: CPT | Mod: S$PBB,,, | Performed by: INTERNAL MEDICINE

## 2020-03-03 PROCEDURE — 99999 PR PBB SHADOW E&M-EST. PATIENT-LVL III: ICD-10-PCS | Mod: PBBFAC,,, | Performed by: INTERNAL MEDICINE

## 2020-03-03 PROCEDURE — 99214 PR OFFICE/OUTPT VISIT, EST, LEVL IV, 30-39 MIN: ICD-10-PCS | Mod: S$PBB,,, | Performed by: INTERNAL MEDICINE

## 2020-03-03 NOTE — PROGRESS NOTES
Subjective:    Patient ID:  Annette Garcia is a 84 y.o. female who presents for follow-up of Deep Vein Thrombosis      HPI    83 y/o female with hx DVT, of difficult to control HTN (previously on a diuretic but dicsontinued due to recurrent dehydration - has had prior visits admitting to dietary indiscretion with high sodium diet including Nicole's Pie, Corn bisque, and onion rings previously was V8, canned foods, TV dinners, soft drinks, gumbo, chinese food), renal artery stenosis (LRA severe ostial disease s/p YOLANDA, RRA with mild-mod disease), anemia, acquired hypothyroidism, diffuse large B-cell lymphoma previously on chemo. She presents for hospital f/u.  Was hospitalized for abdominal pain, found to have bowel obstruction, had surgical intervention, developed septic shock requiring intubation, developed JAS, developed afib with sinus pauses, TVP placed, pt slowly improved, extubated, and eventually discharged to NH. Re-admitted for syncopal episode, found to be orthostatic, clinically improved, and discharged back to NH.  Currently mostly wchr bound due to debility post hospitalization. Has gonzales and following with Urology. Followed with Surgery and plan is to keep ostomy longterm.   Last clinic visit had new left leg edema and erythema, sent to ED, diagnosed with acute iliofemoralpopliteal DVT with post phlebitic syndrome, s/p mechanical thrombectomy with Inari with excellent results and discharged on DOAC. Has done well since, however, continues to have edematous LLE, although improved. No pain or ulceration.    Review of Systems   Constitution: Positive for malaise/fatigue.   HENT: Negative for congestion.    Eyes: Negative for blurred vision.   Cardiovascular: Positive for leg swelling. Negative for chest pain, claudication, cyanosis, dyspnea on exertion, irregular heartbeat, near-syncope, orthopnea, palpitations, paroxysmal nocturnal dyspnea and syncope.   Respiratory: Negative for shortness of  breath.    Endocrine: Negative for polyuria.   Hematologic/Lymphatic: Negative for bleeding problem.   Skin: Negative for itching and rash.   Musculoskeletal: Positive for back pain, joint pain and muscle weakness. Negative for joint swelling and muscle cramps.   Gastrointestinal: Negative for abdominal pain, hematemesis, hematochezia, melena, nausea and vomiting.   Genitourinary: Negative for dysuria and hematuria.   Neurological: Positive for weakness. Negative for dizziness, focal weakness, headaches, light-headedness and loss of balance.   Psychiatric/Behavioral: Negative for depression. The patient is not nervous/anxious.         Objective:    Physical Exam   Constitutional: She is oriented to person, place, and time. She appears well-developed and well-nourished.   HENT:   Head: Normocephalic and atraumatic.   Neck: Neck supple. No JVD present.   Cardiovascular: Normal rate, regular rhythm and normal heart sounds.   Pulses:       Carotid pulses are 2+ on the right side, and 2+ on the left side.       Radial pulses are 2+ on the right side, and 2+ on the left side.        Femoral pulses are 2+ on the right side, and 2+ on the left side.       Posterior tibial pulses are 1+ on the right side, and 1+ on the left side.   Pulmonary/Chest: Effort normal and breath sounds normal.   Abdominal: Soft. Bowel sounds are normal.   Musculoskeletal: She exhibits edema.   Neurological: She is alert and oriented to person, place, and time.   Skin: Skin is warm and dry.   Psychiatric: She has a normal mood and affect. Her behavior is normal. Thought content normal.         Assessment:       1. 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 Bilateral leg edema    4. Paroxysmal atrial fibrillation    5. Renovascular hypertension    6. Old MI (myocardial infarction)    7. Bilateral renal artery stenosis    8. Sinus pause    9. Closed wedge  compression fracture of eleventh thoracic vertebra with routine healing    10. Osteoarthritis of lumbar spine, unspecified spinal osteoarthritis complication status    11. Anemia due to antineoplastic chemotherapy    12. Anemia of acute infection    13. Diffuse large B-cell lymphoma, unspecified body region    14. Acquired hypothyroidism    15. S/P exploratory laparotomy    16. History of stent insertion of left renal artery 7/19/17    17. Nursing home resident      83 y/o pt with hx and presentation as above. Doing well from a cardiac perspective and compensated from a HF perspective. LLE continues to be edematous, however, improved. Will obtain repeat venous doppler. Continue DOAC. Compression stockings. Needs to be more active. Discussed the etiology, evaluation, and management of DVT, DOAC, Afib, May Thurner, HTN, OA, anemia. Discussed the importance of med compliance, heart healthy diet, and regular exercise.        Plan:       -LLE venous doppler   -f/u in 1 month

## 2020-03-04 NOTE — PROGRESS NOTES
[facility]                                                                                     Skilled Nursing Facility                                                                 Progress Note      Admit Date:   02/04/2020  JESSICA   Principal Problem:   debility R/T recent left femoral DVT, s/p thrombectomy  HPI obtained from patient interview and chart review    Visit Date: 3/5/2020    Chief Complaint:  Cardiology recommendation, Urology recommendations, ultrasound review, Wound evaluation, BP/HR monitoring, PT/OT progression    HPI:   Ms. Garcia is an 84-year-old female with a past medical history of peripheral vascular disease, renovascular hypertension, renal artery stenosis status post left renal artery stent placement on 7/19/17, coronary artery disease with history of myocardial infarction, diffuse large B cell lymphoma, anemia, epilepsy, hypothyroidism, chronic hyponatremia, depression, lumbar and sacroiliac joint osteoarthritis with chronic low back pain and history of lumbar spine surgery in 2013, slow transit constipation, with hx of small bowel resection on 8/23/16.     Interval history:  Wound evaluation completed today of right heel unstageable DTI. WC RN at facility to manage wound and continue to aggressively treat.  Will continue to monitor weekly. /65, HR 81, stable on current cardiac regimen.  Patient went to cardiology appointment on 03/03, Eliquis decreased to 2.5 mg p.o. b.i.d., ordered compression stockings, also ordered ultrasound of lower extremities to be completed at facility.  Results reviewed today, nursing reporting they faxed results to cardiologist's office, awaiting further recommendations.  Patient continues with lower extremity edema.  Patient also went to urology appointment on 02/28, voiding trial completed, Martinez discontinued, patient voiding  without difficulty since Martinez removed.  Patient has no complaints today during visit.  Will continue to monitor and treat of chronic conditions.  Patient progressing slowly with PT/OT due to chronic debility.    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    Past Surgical History: Patient has a past surgical history that includes Cholecystectomy; Appendectomy; Tonsillectomy; Vaginal hysterectomy w/ anterior and posterior vaginal repair; Cataract extraction, bilateral (Bilateral); Hysterectomy; Eye surgery (Bilateral); Portacath placement (Right, 09/2016); Back surgery (1988); Back surgery (02/2013); Renal artery stent (Left, 07/19/2017); Small intestine surgery (08/23/2016); Sigmoidectomy (10/29/2019); Thrombectomy (N/A, 2/3/2020); and Percutaneous transluminal angioplasty (PTA) of peripheral vessel (N/A, 2/3/2020).    Social History: Patient reports that she has never smoked. She has never used smokeless tobacco. She reports that she does not drink alcohol or use drugs.    Family History: family history includes Heart attack in her father; Hypertension in her father; Pancreatic cancer in her mother.    Allergies: Patient is allergic to adhesive; penicillins; tramadol; avelox [moxifloxacin]; amoxil [amoxicillin]; aspridrox [aspirin, buffered]; codeine; keflex [cephalexin]; norvasc [amlodipine]; red dye; robitussin [guaifenesin]; sulfa (sulfonamide antibiotics); tylenol [acetaminophen]; and vicks vaporub [camphor-eucalyptus oil-menthol].    ROS  Constitutional: Negative for fever or fatigue  Eyes: Negative for blurred vision, double vision and discharge.   Respiratory: Negative for cough, shortness of breath and wheezing.    Cardiovascular: Negative for chest pain,  palpitations, and +leg swelling.   Gastrointestinal: Negative for abdominal pain, constipation, diarrhea, + intermittent nausea and vomiting.   Genitourinary: Negative for dysuria, frequency and urgency.   Musculoskeletal:  + generalized weakness. Negative for back pain and myalgias  Skin: Negative for itching and rash, + new right heel unstageable DTI  Neurological: Negative for dizziness, speech change, and headaches.   Psychiatric/Behavioral: Negative for depression. The patient is not nervous/anxious.      PEx     Constitutional: Patient appears well-developed and in no distress   Head: Normocephalic and atraumatic.   Eyes: Pupils are equal, round, and reactive to light.   Neck: Normal range of motion. Neck supple.   Cardiovascular: Normal rate, regular rhythm and normal heart sounds, + LLE edema +3.    Pulmonary/Chest: Effort normal and breath sounds are clear  Abdominal: Soft. Bowel sounds are normal, +ostomy with brown stool noted  Musculoskeletal: Normal range of motion  Neurological: Alert and oriented to person, place, and time.   Psychiatric: Normal mood and affect. Behavior is normal  Skin: Skin is warm and dry, + right heel unstageable DTI    Wound evaluation completed on 3/5/20:    Wound location:  Right heel unstageable DTI, improving  Date identified:  02/05/2020  Present on admit:  Yes  Appearance of wound:  Purple  Drainage characteristic:  None  Wound length:  3.0 cm  Wound width:  4.5 cm  Wound depth:0.0 cm   Alie wound area:  normal skin tone   Following: This NP weekly- last observed on (3/5/2020), wound care RN weekly- last observed on (3/5/2020)      Assessment and Plan:    After care of recent Acute deep vein thrombosis (DVT) of femoral vein of left lower extremity, improving, ongoing  -s/p Thrombectomy performed on 02/03 for persistent clot, with stent placement  -cardiologist is Dr. Simmons  -Provoked DVT in setting of immobility     -Eliquis initiated and will need OAC for the [facility]  future   -continue Eliquis 10 mg p.o. b.i.d. x7 days for bolus dosing-end date 02/11, then changed to Eliquis 5 mg p.o. b.i.d. indefinitely until follow-up with Cardiology  -3/5 continue newly decreased Eliquis 2.5 mg p.o. b.i.d. per Cardiology appointment on 03/03, continue compression stockings, lower extremity ultrasound completed at facility, awaiting recommendations from Cardiology     Debility r/t recent DVT, immobility, ongoing  - Continue with PT/OT for gait training and strengthening and restoration of ADL's   - Encourage mobility, OOB in chair, and early ambulation as appropriate  - Fall precautions   - Monitor for bowel and bladder dysfunction  - Monitor for and prevent skin breakdown and pressure ulcers  - Continue DVT prophylaxis with  Eliquis  - Pain control: Tylenol PRN  -3/5 continue PT/OT    Peripheral vascular disease, unspecified, chronic  Remote Hx of MI   HTN with Hyperlipidemia, stable  -LDL 97 11/2019  -Continue Lisinopril, Coreg ( d/anabella during admit), pravastatin, Clonidine QD  -3/5 Continue current regimen    Epilepsy, chronic, stable  MDD with recurrent episodes, ongoing  -Continue home mirtazapine 7.5mg HS.  -Continue phenobarb, Trileptal  -2/6 recent phenobarb level 12.4-low, initiate redraw to verify and may increase dose  -2/14 initiate increase in phenobarbital to 97.2 mg p.o. Q evening, initiate repeat phenobarbital level in 2 weeks, phenobarbital level 12.1, monitor for seizure activity  -3/5 continue increased phenobarbital, will monitor level for efficacy    1.  Right heel unstageable DTI, improving  -R chest wall port accessed-de accessed  -Continue Vit C, prostat, HS 2 oz BID  - WC RN following closely, will follow weekly  - wound assessment completed, continue with current orders  -3/5 continue current wound care orders    Recent history of Urinary retention, resolved  Recent Hx of  Proteus mirabilis UTI, resolved  -Follow up with Urology as outpatient.   -1/17 Tx with  trimethoprim   -3/5 patient voiding since Martinez catheter removed, will monitor    Continued    Decreased Appetite, ongoing  -Continue Remeron , HS 2 oz BID    Hypoalbuminemia, ongoing  Hyponatremia, ongoing  Hypophosphatemia, improved  Hypomagnesemia, improved  Hypokalemia, improved  -continue  Mg replacement  -continue ProStat, vitamin/mineral replacement    Vitamin D Deficiency, stable  -most recent vitamin-D level 16 02/2019, 22.2 1/2020  -Continue Vitamin D 3 replacement + Ca/Vit D supplement  -2/6 Most recent level 22.2, improving on supplementation    Acquired hypothyroidism, stable  -most recent TSH 0.507 11/2019 (10.32 previously)  -Continue home levothyroxine 125 mcg before breakfast.  -2/6 TSH 4.12, will repeat after acute DVT tx resolved due to inflammation    Recent Hx of Colon necrosis, improved   S/P sigmoidectomy with ostomy placement 10/29/19  E. coli bacteremia, resolved  -Previously Treated with Keflex and metronidazole per ID   -Continue ostomy care    DLBCL (diffuse large B cell lymphoma)  Anemia due to antineoplastic chemotherapy  Thrombocytopenia, stable  -most recent H&H 9/28 11/2019  -Transfused pRBCs during admit  -Patient has RU chest wall port  -Follow up with hem onc as outpatient.    Slow transit constipation, ongoing  -Continue polyethylene glycol BID (home med)        Outpatient MDs: Her primary care physician is Dr. Jose Torres. Her pain management specialist is Dr. Chirag Bates. Her neurologist is Dr. Gamal Lock.       Recent Lab work: 1/2020  TSH 4.12 high, phenobarb level 12.4 low, Vitamin D 22.2 low    Future Appointments   Date Time Provider Department Center   3/30/2020 12:45 PM [facility] US1 [facility]PH [facility]   4/2/2020 11:00 AM CHAIR 07 [facility] KN CHEMO [facility]   4/9/2020  2:00 PM Timmy Simmons MD [facility] CARDIO Geno Dawkins NP          Patient note was created using MModal Dictation.  Any errors in syntax or even information may  not have been identified and edited on initial review prior to signing this note.

## 2020-03-05 ENCOUNTER — DOCUMENTATION ONLY (OUTPATIENT)
Dept: HEPATOLOGY | Facility: HOSPITAL | Age: 85
End: 2020-03-05

## 2020-03-06 ENCOUNTER — TELEPHONE (OUTPATIENT)
Dept: CARDIOLOGY | Facility: CLINIC | Age: 85
End: 2020-03-06

## 2020-03-06 NOTE — TELEPHONE ENCOUNTER
----- Message from Zohra Wolfe MA sent at 3/5/2020  4:56 PM CST -----  Contact: Lakisha @ [facility] [phone] or [employer] of the Patient       ----- Message -----  From: Sandra Sheffield  Sent: 3/5/2020   3:58 PM CST  To: Troy Warner Staff    [email or web address] is requesting a call back to speak with Nurse about some paper's that were faxed over      Please call and advise            Thank you

## 2020-03-06 NOTE — TELEPHONE ENCOUNTER
Reached out to Lakisha soria/ [facility]    Requested to know if we received US results that were faxed to our office, requested that they refax the results to [phone]    Also advised Lakisha about pt upcoming milagros

## 2020-03-13 ENCOUNTER — TELEPHONE (OUTPATIENT)
Dept: CARDIOLOGY | Facility: CLINIC | Age: 85
End: 2020-03-13

## 2020-03-13 NOTE — TELEPHONE ENCOUNTER
Reached out to [facility] in regards to cancelling pt's upcoming US test     Per Dr. Simmons pt does not need repeat US due to recent US pt had done at [facility]     US results scanned in chart today 3/13/2020

## 2020-03-27 ENCOUNTER — DOCUMENTATION ONLY (OUTPATIENT)
Dept: HEPATOLOGY | Facility: HOSPITAL | Age: 85
End: 2020-03-27

## 2020-03-27 NOTE — PROGRESS NOTES
[facility]                                                                                     Long Term Care                                                                 Progress Note        Visit Date: 3/27/2020    Chief Complaint:  UA review, re-evaluation vomiting fever, BP/HR/O2 monitoring    HPI:   Patient seen today to re-evaluate recent fever, vomiting on 3/24.  Chest x-ray and UA with reflex ordered at that time due to recurrence history of UTIs and current COVID 19 pandemic in area.  Chest x-ray reviewed, negative for any abnormalities.  UA reviewed, positive for nitrates, blood, leukocytes, wbc's.  Decision made to start patient on Trimethoprim x 10 days due to sulfa allergy, per previous urology recommendations.  Decision made not to test patient for COVID 19 due to negative x-ray, lack of respiratory symptoms.  Decided to monitor for improvement with UTI treatment.  Patient has not had fever over the last 2 days during visit today.  /68, HR 78, O2 sats 97%, stable.  UC unavailable today.  Patient continuing on antibiotic without issues.  She seems improved since treatment started.  Continue with current treatment plan and monitor for improvement.  No other changes needed to current plan of care at this time.  No other concerns per nursing.  Of note, patient is now long-term care after skilled therapy.    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    Past Surgical History: Patient has a past surgical history that includes Cholecystectomy; Appendectomy; Tonsillectomy; Vaginal hysterectomy w/ anterior and posterior vaginal repair;  Cataract extraction, bilateral (Bilateral); Hysterectomy; Eye surgery (Bilateral); Portacath placement (Right, 09/2016); Back surgery (1988); Back surgery (02/2013); Renal artery stent (Left, 07/19/2017); Small intestine surgery (08/23/2016); Sigmoidectomy (10/29/2019); Thrombectomy (N/A, 2/3/2020); and Percutaneous transluminal angioplasty (PTA) of peripheral vessel (N/A, 2/3/2020).    Social History: Patient reports that she has never smoked. She has never used smokeless tobacco. She reports that she does not drink alcohol or use drugs.    Family History: family history includes Heart attack in her father; Hypertension in her father; Pancreatic cancer in her mother.    Allergies: Patient is allergic to adhesive; penicillins; tramadol; avelox [moxifloxacin]; amoxil [amoxicillin]; aspridrox [aspirin, buffered]; codeine; keflex [cephalexin]; norvasc [amlodipine]; red dye; robitussin [guaifenesin]; sulfa (sulfonamide antibiotics); tylenol [acetaminophen]; and vicks vaporub [camphor-eucalyptus oil-menthol].    ROS  Constitutional: Negative for fever or fatigue  Eyes: Negative for blurred vision, double vision and discharge.   Respiratory: Negative for cough, shortness of breath and wheezing.    Cardiovascular: Negative for chest pain, palpitations, and +leg swelling.   Gastrointestinal: Negative for abdominal pain, constipation, diarrhea, + intermittent nausea and vomiting.   Genitourinary: Negative for dysuria, frequency and urgency.   Musculoskeletal:  + generalized weakness. Negative for back pain and myalgias  Skin: Negative for itching and rash  Neurological: Negative for dizziness, speech change, and headaches.   Psychiatric/Behavioral: Negative for depression. The patient is not nervous/anxious.      PEx     Constitutional: Patient appears well-developed and in no distress   Head: Normocephalic and atraumatic.   Eyes: Pupils are equal, round, and reactive to light.   Neck: Normal range of motion. Neck supple.    Cardiovascular: Normal rate, regular rhythm and normal heart sounds, + LLE edema +3.    Pulmonary/Chest: Effort normal and breath sounds are clear  Abdominal: Soft. Bowel sounds are normal, +ostomy with brown stool noted  Musculoskeletal: Normal range of motion  Neurological: Alert and oriented to person, place, and time.   Psychiatric: Normal mood and affect. Behavior is normal  Skin: Skin is warm and dry    Assessment and Plan:    Positive UA, new  Recent history of fever, vomiting, improved  Recent history of Urinary retention, resolved  Recent Hx of  Proteus mirabilis UTI, resolved  -Follow up with Urology as outpatient>voiding since gonzales D/Sai.   -1/17 Tx with trimethoprim   -3/27 initiated  trimethoprim 100 mg p.o. b.i.d. times 10 days for positive UA    After care of recent Acute deep vein thrombosis (DVT) of femoral vein of left lower extremity, improving, ongoing  -s/p Thrombectomy performed on 02/03 for persistent clot, with stent placement  -cardiologist is Dr. Simmons  -Provoked DVT in setting of immobility     -Eliquis initiated and will need OAC for the forseeable future   -continue Eliquis 10 mg p.o. b.i.d. x7 days for bolus dosing-end date 02/11, then changed to Eliquis 5 mg p.o. b.i.d. indefinitely until follow-up with Cardiology  -3/5 continue newly decreased Eliquis 2.5 mg p.o. b.i.d. per Cardiology appointment on 03/03, continue compression stockings, lower extremity ultrasound completed at facility, awaiting recommendations from Cardiology   -3/27 continue Eliquis per Cardiology recs    Peripheral vascular disease, unspecified, chronic  Remote Hx of MI   HTN with Hyperlipidemia, stable  -LDL 97 11/2019  -Continue Lisinopril, Coreg ( d/sai during admit), pravastatin, Clonidine QD  -3/27 Continue current regimen    Continued    Epilepsy, chronic, stable  MDD with recurrent episodes, ongoing  -Continue home mirtazapine 7.5mg HS.  -Continue phenobarb, Trileptal  -2/6 recent phenobarb level  12.4-low, initiate redraw to verify and may increase dose  -2/14 initiate increase in phenobarbital to 97.2 mg p.o. Q evening, initiate repeat phenobarbital level in 2 weeks, phenobarbital level 12.1, monitor for seizure activity  -3/5 continue increased phenobarbital, will monitor level for efficacy    Decreased Appetite, ongoing  -Continue Remeron , HS 2 oz BID    Hypoalbuminemia, ongoing  Hyponatremia, ongoing  Hypophosphatemia, improved  Hypomagnesemia, improved  Hypokalemia, improved  -continue  Mg replacement  -continue ProStat, vitamin/mineral replacement    Vitamin D Deficiency, stable  -most recent vitamin-D level 16 02/2019, 22.2 1/2020  -Continue Vitamin D 3 replacement + Ca/Vit D supplement  -2/6 Most recent level 22.2, improving on supplementation    Acquired hypothyroidism, stable  -most recent TSH 0.507 11/2019 (10.32 previously)  -Continue home levothyroxine 125 mcg before breakfast.  -2/6 TSH 4.12, will repeat after acute DVT tx resolved due to inflammation    Recent Hx of Colon necrosis, improved   S/P sigmoidectomy with ostomy placement 10/29/19  E. coli bacteremia, resolved  -Previously Treated with Keflex and metronidazole per ID   -Continue ostomy care    DLBCL (diffuse large B cell lymphoma)  Anemia due to antineoplastic chemotherapy  Thrombocytopenia, stable  -most recent H&H 9/28 11/2019  -Transfused pRBCs during admit  -Patient has RU chest wall port  -Follow up with hem onc as outpatient.    Slow transit constipation, ongoing  -Continue polyethylene glycol BID (home med)    Debility r/t recent DVT, immobility, ongoing  - Encourage mobility, OOB in chair, and early ambulation as appropriate  - Fall precautions   - Monitor for bowel and bladder dysfunction  - Monitor for and prevent skin breakdown and pressure ulcers  - Continue DVT prophylaxis with  Eliquis  - Pain control: Tylenol PRN        Outpatient MDs: Her primary care physician is Dr. Jose Torres. Her pain management specialist  is Dr. Chirag Bates. Her neurologist is Dr. Gamal Lock.       Recent Lab work: 1/2020  TSH 4.12 high, phenobarb level 12.4 low, Vitamin D 22.2 low    Future Appointments   Date Time Provider Department Center   4/2/2020 11:00 AM CHAIR 07 [facility] CHEMO [facility]   4/9/2020  2:00 PM Timmy Simmons MD [facility] CARDIO Geno Dawkins NP          Patient note was created using MModal Dictation.  Any errors in syntax or even information may not have been identified and edited on initial review prior to signing this note.

## 2020-03-30 ENCOUNTER — HOSPITAL ENCOUNTER (EMERGENCY)
Facility: HOSPITAL | Age: 85
Discharge: HOME | End: 2020-03-31
Attending: EMERGENCY MEDICINE
Payer: MEDICARE

## 2020-03-30 DIAGNOSIS — R40.20 LOC (LOSS OF CONSCIOUSNESS): ICD-10-CM

## 2020-03-30 LAB
ANION GAP SERPL CALC-SCNC: 15 MMOL/L (ref 8–16)
BASOPHILS # BLD AUTO: 0.01 K/UL (ref 0–0.2)
BASOPHILS NFR BLD: 0.2 % (ref 0–1.9)
BUN SERPL-MCNC: 32 MG/DL (ref 8–23)
CALCIUM SERPL-MCNC: 9.9 MG/DL (ref 8.7–10.5)
CHLORIDE SERPL-SCNC: 97 MMOL/L (ref 95–110)
CO2 SERPL-SCNC: 20 MMOL/L (ref 23–29)
CREAT SERPL-MCNC: 1.1 MG/DL (ref 0.5–1.4)
DIFFERENTIAL METHOD: ABNORMAL
EOSINOPHIL # BLD AUTO: 0.1 K/UL (ref 0–0.5)
EOSINOPHIL NFR BLD: 1.3 % (ref 0–8)
ERYTHROCYTE [DISTWIDTH] IN BLOOD BY AUTOMATED COUNT: 13.5 % (ref 11.5–14.5)
EST. GFR  (AFRICAN AMERICAN): 54 ML/MIN/1.73 M^2
EST. GFR  (NON AFRICAN AMERICAN): 47 ML/MIN/1.73 M^2
GLUCOSE SERPL-MCNC: 114 MG/DL (ref 70–110)
HCT VFR BLD AUTO: 31.6 % (ref 37–48.5)
HGB BLD-MCNC: 10.3 G/DL (ref 12–16)
IMM GRANULOCYTES # BLD AUTO: 0.01 K/UL (ref 0–0.04)
IMM GRANULOCYTES NFR BLD AUTO: 0.2 % (ref 0–0.5)
LYMPHOCYTES # BLD AUTO: 2 K/UL (ref 1–4.8)
LYMPHOCYTES NFR BLD: 32.9 % (ref 18–48)
MCH RBC QN AUTO: 33.7 PG (ref 27–31)
MCHC RBC AUTO-ENTMCNC: 32.6 G/DL (ref 32–36)
MCV RBC AUTO: 103 FL (ref 82–98)
MONOCYTES # BLD AUTO: 0.6 K/UL (ref 0.3–1)
MONOCYTES NFR BLD: 10.4 % (ref 4–15)
NEUTROPHILS # BLD AUTO: 3.3 K/UL (ref 1.8–7.7)
NEUTROPHILS NFR BLD: 55 % (ref 38–73)
NRBC BLD-RTO: 0 /100 WBC
PLATELET # BLD AUTO: 167 K/UL (ref 150–350)
PMV BLD AUTO: 10 FL (ref 9.2–12.9)
POTASSIUM SERPL-SCNC: 4.9 MMOL/L (ref 3.5–5.1)
RBC # BLD AUTO: 3.06 M/UL (ref 4–5.4)
SODIUM SERPL-SCNC: 132 MMOL/L (ref 136–145)
WBC # BLD AUTO: 5.99 K/UL (ref 3.9–12.7)

## 2020-03-30 PROCEDURE — 93010 ELECTROCARDIOGRAM REPORT: CPT | Mod: ,,, | Performed by: INTERNAL MEDICINE

## 2020-03-30 PROCEDURE — 80048 BASIC METABOLIC PNL TOTAL CA: CPT

## 2020-03-30 PROCEDURE — 93010 EKG 12-LEAD: ICD-10-PCS | Mod: ,,, | Performed by: INTERNAL MEDICINE

## 2020-03-30 PROCEDURE — 99284 EMERGENCY DEPT VISIT MOD MDM: CPT | Mod: 25

## 2020-03-30 PROCEDURE — 85025 COMPLETE CBC W/AUTO DIFF WBC: CPT

## 2020-03-30 PROCEDURE — 96360 HYDRATION IV INFUSION INIT: CPT

## 2020-03-30 PROCEDURE — 93005 ELECTROCARDIOGRAM TRACING: CPT

## 2020-03-31 VITALS
RESPIRATION RATE: 18 BRPM | TEMPERATURE: 98 F | BODY MASS INDEX: 29.99 KG/M2 | OXYGEN SATURATION: 95 % | DIASTOLIC BLOOD PRESSURE: 58 MMHG | HEIGHT: 57 IN | SYSTOLIC BLOOD PRESSURE: 123 MMHG | WEIGHT: 139 LBS | HEART RATE: 64 BPM

## 2020-03-31 PROCEDURE — 63600175 PHARM REV CODE 636 W HCPCS: Performed by: EMERGENCY MEDICINE

## 2020-03-31 RX ORDER — SODIUM CHLORIDE 9 MG/ML
1000 INJECTION, SOLUTION INTRAVENOUS
Status: COMPLETED | OUTPATIENT
Start: 2020-03-31 | End: 2020-03-31

## 2020-03-31 RX ADMIN — SODIUM CHLORIDE 1000 ML: 0.9 INJECTION, SOLUTION INTRAVENOUS at 12:03

## 2020-03-31 NOTE — ED NOTES
Blood pressure 74/45 on monitor, 80/50 manually patient arouse to tactile stimuli. Dr. Curran notified.

## 2020-03-31 NOTE — ED NOTES
Pt arrives via EMS from [facility] following syncopal episode witnessed by roommate. Staff reports pt passed out and slid out of recliner onto the floor. Pt only remembers waking up in ambulance. EMS reports initial SBP of 79. Pt became normotensive in ambulance after 100 mL NS. According to EMS, staff reports pt received all of evening BP meds and BP prior to administration was acceptable. Pt is AAOx3, respirations even and unlabored, denies complaints at this time. NAD noted. CB within reach.

## 2020-03-31 NOTE — ED PROVIDER NOTES
Encounter Date: 3/30/2020       History     Chief Complaint   Patient presents with    Loss of Consciousness     Pts roomate at [facility] reports pt had syncopal episode while sitting in recliner and slid to floor. Pts SBP in 70s upon EMS arrival, increased to normotensive after 100 mL NS. Staff reports pt received all BP meds this evening.     Annette Garcia is a 84 y.o. 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    As per patient any EMS patient had witnessed syncopal episode while in nursing home.  As per EMS patient had BP 70 systolic on arrival.  Given fluids with appropriate response BP now within normal limits.  EMS and patient denies any new onset numbness, tingling, chest pain, shortness of breath, nausea, vomiting or diarrhea.  As per nurse patient given BP meds soon before syncopal episode.  Patient currently being treated for UTI.          Review of patient's allergies indicates:   Allergen Reactions    Adhesive Itching and Blisters    Penicillins Anaphylaxis    Tramadol Hives    Avelox [moxifloxacin] Rash     Facial and arm itching and redness. Pt states throat closes when given.    Amoxil [amoxicillin]     Aspridrox [aspirin, buffered]     Codeine Other (See Comments)     Throat swelling    Keflex [cephalexin]      Tolerated cefepime and cefazolin    Norvasc [amlodipine]     Red dye Hives    Robitussin [guaifenesin]     Sulfa (sulfonamide antibiotics)     Tylenol [acetaminophen]      Has reaction to Tylenol with red dye and unable to take Extra Strength Tylenol/ CAN ONLY TOLERATE REG STRENGTH TYLENOL    Vicks vaporub [camphor-eucalyptus oil-menthol]      Past Medical History:   Diagnosis Date    Allergy     Cancer     colon    Disorder of kidney  and ureter     E coli bacteremia 10/29/2019    Hypertension     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    In the setting of septic shock and near death    Petit mal epilepsy 1954    Scoliosis of lumbar spine     Seizures     Unspecified hypothyroidism      Past Surgical History:   Procedure Laterality Date    APPENDECTOMY      BACK SURGERY  1988    vertebral fracture    BACK SURGERY  02/2013    lumbar L2-5    CATARACT EXTRACTION, BILATERAL Bilateral     CHOLECYSTECTOMY      open    EYE SURGERY Bilateral     cataract removal with lens implant    HYSTERECTOMY      PERCUTANEOUS TRANSLUMINAL ANGIOPLASTY (PTA) OF PERIPHERAL VESSEL N/A 2/3/2020    Procedure: PTA, PERIPHERAL VESSEL;  Surgeon: Timmy Simmons MD;  Location: [facility] CATH LAB/EP;  Service: Cardiology;  Laterality: N/A;    PORTACATH PLACEMENT Right 09/2016    RENAL ARTERY STENT Left 07/19/2017    SIGMOIDECTOMY  10/29/2019    SMALL INTESTINE SURGERY  08/23/2016    THROMBECTOMY N/A 2/3/2020    Procedure: THROMBECTOMY;  Surgeon: Timmy Simmons MD;  Location: [facility] CATH LAB/EP;  Service: Cardiology;  Laterality: N/A;    TONSILLECTOMY      VAGINAL HYSTERECTOMY W/ ANTERIOR AND POSTERIOR VAGINAL REPAIR       Family History   Problem Relation Age of Onset    Pancreatic cancer Mother     Hypertension Father     Heart attack Father      Social History     Tobacco Use    Smoking status: Never Smoker    Smokeless tobacco: Never Used   Substance Use Topics    Alcohol use: No    Drug use: No     Review of Systems   Constitutional: Negative for fever.   HENT: Negative for congestion.    Respiratory: Negative for cough and shortness of breath.    Cardiovascular: Negative for chest pain.   Gastrointestinal: Negative for abdominal pain and nausea.   Genitourinary: Negative for dysuria.   Skin: Negative for color change.   Neurological: Positive for syncope. Negative for dizziness.       Physical Exam     Initial Vitals   BP Pulse Resp Temp SpO2    03/30/20 2231 03/30/20 2231 03/30/20 2231 03/30/20 2234 03/30/20 2231   (!) 118/59 71 17 98.1 °F (36.7 °C) (!) 94 %      MAP       --                Physical Exam    Constitutional: She appears well-developed and well-nourished. She is not diaphoretic. No distress.   HENT:   Head: Normocephalic and atraumatic.   Eyes: Conjunctivae and EOM are normal. Pupils are equal, round, and reactive to light. Right eye exhibits no discharge. Left eye exhibits no discharge. No scleral icterus.   Neck: Normal range of motion. Neck supple.   Cardiovascular: Normal rate and regular rhythm. Exam reveals no friction rub.    No murmur heard.  Pulmonary/Chest: Breath sounds normal. No respiratory distress. She has no wheezes. She has no rales.   Abdominal: Soft. Bowel sounds are normal. She exhibits no distension. There is no tenderness. There is no rebound and no guarding.   Musculoskeletal: Normal range of motion.   Neurological: She is alert and oriented to person, place, and time. She has normal strength. No cranial nerve deficit or sensory deficit. GCS score is 15. GCS eye subscore is 4. GCS verbal subscore is 5. GCS motor subscore is 6.   Skin: Skin is warm and dry. No erythema. No pallor.         ED Course   Procedures  Labs Reviewed   CBC W/ AUTO DIFFERENTIAL - Abnormal; Notable for the following components:       Result Value    RBC 3.06 (*)     Hemoglobin 10.3 (*)     Hematocrit 31.6 (*)     Mean Corpuscular Volume 103 (*)     Mean Corpuscular Hemoglobin 33.7 (*)     All other components within normal limits   BASIC METABOLIC PANEL - Abnormal; Notable for the following components:    Sodium 132 (*)     CO2 20 (*)     Glucose 114 (*)     BUN, Bld 32 (*)     eGFR if  54 (*)     eGFR if non  47 (*)     All other components within normal limits     EKG Readings: (Independently Interpreted)   Initial Reading: No STEMI. Previous EKG: Compared with most recent EKG Rhythm: Normal Sinus Rhythm. Heart  Rate: 72. ST Segments: Normal ST Segments. Axis: Normal.     ECG Results          EKG 12-lead (Final result)  Result time 03/31/20 08:48:05    Final result by Interface, [facility] (03/31/20 08:48:05)                 Narrative:    Test Reason : R40.20,    Vent. Rate : 072 BPM     Atrial Rate : 072 BPM     P-R Int : 170 ms          QRS Dur : 084 ms      QT Int : 390 ms       P-R-T Axes : 071 011 055 degrees     QTc Int : 427 ms    Normal sinus rhythm  Low voltage QRS  Borderline Abnormal ECG  When compared with ECG of 21-NOV-2019 11:27,  No significant change was found  Confirmed by Bucky DWYER MD, Ramírez PACK ([id number]) on 3/31/2020 8:47:38 AM    Referred By: System System           Confirmed By:Ramírez Lawler III, MD                            Imaging Results    None          Medical Decision Making:   History:   Old Medical Records: I decided to obtain old medical records.  Initial Assessment:   84-year-old past medical history DVT, allergies, rate AFib, seizures presenting with a witnessed syncopal episode hypotensive in the field.  /61 non tachy afebrile patient well-appearing asymptomatic.  PE unremarkable.  Differential Diagnosis:   DX includes arrhythmia, dehydration, sepsis, electrolyte derangement electrolyte derangement, UTI , low likelihood of ACS at this time  Clinical Tests:   Lab Tests: Ordered and Reviewed  Medical Tests: Ordered and Reviewed  ED Management:  Plan:  Obtain CBC, CMP, reassess.  Patient is at her baseline at the moment                   ED Course as of Mar 31 1627   Mon Mar 30, 2020   2346 Patient well-appearing labs unremarkable will discharge home to nursing home.    [DC]   Tue Mar 31, 2020   0032 Ntoed while pt sleeping BP 80s/40s , however when awake BP WNL, will give additional fluids and continue to reassess.     [DC]   0131 Noted BP maintaining elevated when patient in ED after fluids safer plan discharge.    [DC]      ED Course User Index  [DC] Jerome Curran Jr., MD                 Clinical Impression:       ICD-10-CM ICD-9-CM   1. LOC (loss of consciousness) R40.20 780.09             ED Disposition Condition    Discharge Stable        ED Prescriptions     None        Follow-up Information     Follow up With Specialties Details Why Contact Info    Jose Valles MD Family Medicine   [address] [place] [id number]  [phone]      [facility]  If symptoms worsen [address] [facility] [address] [phone]                                     Jerome Curran Jr., MD  03/31/20 7722

## 2020-03-31 NOTE — ED NOTES
Pt incontinent, unsuccessful in attempt to void on bed pan. Pts daughter reports pt was dx w/ a UTI a week ago and has been taking Bacterim. Per Dr. Curran, discontinue urinalysis.

## 2020-04-02 NOTE — PROGRESS NOTES
[facility]                                                                                     Long Term Care                                                                 Progress Note        Visit Date: 4/3/2020    Chief Complaint:  Return to facility from ED visit, BP/HR/O2 monitoring    HPI:   Patient seen today after return to facility from recent ED visit on 03/30 for witnessed syncopal episode.  Patient's roommate noted patient had syncopal episode in her recliner chair in room causing patient to slide to the floor.  Nursing sent patient to ED for evaluation.  Patient given IV fluids, BP return to normal.  No other changes made to plan of care, patient was stable on discharge back to facility.  Most recent /53, HR 76, O2 sats 98% on room air, stable.  Patient is antibiotic for treatment of UTI completes today for positive UA.  Culture not completed.  Patient has no complaints today during visit. No other concerns per nursing.  Will continue to monitor and treat of chronic conditions.    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    Past Surgical History: Patient has a past surgical history that includes Cholecystectomy; Appendectomy; Tonsillectomy; Vaginal hysterectomy w/ anterior and posterior vaginal repair; Cataract extraction, bilateral (Bilateral); Hysterectomy; Eye surgery (Bilateral); Portacath placement (Right, 09/2016); Back surgery (1988); Back surgery (02/2013); Renal artery stent (Left, 07/19/2017); Small intestine surgery (08/23/2016); Sigmoidectomy (10/29/2019); Thrombectomy (N/A, 2/3/2020); and Percutaneous transluminal  angioplasty (PTA) of peripheral vessel (N/A, 2/3/2020).    Social History: Patient reports that she has never smoked. She has never used smokeless tobacco. She reports that she does not drink alcohol or use drugs.    Family History: family history includes Heart attack in her father; Hypertension in her father; Pancreatic cancer in her mother.    Allergies: Patient is allergic to adhesive; penicillins; tramadol; avelox [moxifloxacin]; amoxil [amoxicillin]; aspridrox [aspirin, buffered]; codeine; keflex [cephalexin]; norvasc [amlodipine]; red dye; robitussin [guaifenesin]; sulfa (sulfonamide antibiotics); tylenol [acetaminophen]; and vicks vaporub [camphor-eucalyptus oil-menthol].    ROS  Constitutional: Negative for fever or fatigue  Eyes: Negative for blurred vision, double vision and discharge.   Respiratory: Negative for cough, shortness of breath and wheezing.    Cardiovascular: Negative for chest pain, palpitations, and +leg swelling.   Gastrointestinal: Negative for abdominal pain, constipation, diarrhea, + intermittent nausea and vomiting.   Genitourinary: Negative for dysuria, frequency and urgency.   Musculoskeletal:  + generalized weakness. Negative for back pain and myalgias  Skin: Negative for itching and rash  Neurological: Negative for dizziness, speech change, and headaches.   Psychiatric/Behavioral: Negative for depression. The patient is not nervous/anxious.      PEx     Constitutional: Patient appears well-developed and in no distress   Head: Normocephalic and atraumatic.   Eyes: Pupils are equal, round, and reactive to light.   Neck: Normal range of motion. Neck supple.   Cardiovascular: Normal rate, regular rhythm and normal heart sounds, + LLE edema +3.    Pulmonary/Chest: Effort normal and breath sounds are clear  Abdominal: Soft. Bowel sounds are normal, +ostomy with brown stool noted  Musculoskeletal: Normal range of motion  Neurological: Alert and oriented to person, place, and time.    Psychiatric: Normal mood and affect. Behavior is normal  Skin: Skin is warm and dry    Assessment and Plan:    After care of recent syncopal episode requiring ED visit, new  Peripheral vascular disease, unspecified, chronic  Remote Hx of MI   HTN with Hyperlipidemia, stable  -LDL 97 11/2019  -Continue Lisinopril, Coreg ( d/anabella during admit), pravastatin, Clonidine QD  -4/3 Continue current regimen    Positive UA, resolved  Recent history of fever, vomiting, improved  Recent history of Urinary retention, resolved  Recent Hx of  Proteus mirabilis UTI, resolved  -Follow up with Urology as outpatient>voiding since gonzales D/Anabella.   -1/17 Tx with trimethoprim   -3/27 initiated  trimethoprim 100 mg p.o. b.i.d. times 10 days for positive UA  -4/3  trimethoprim for positive UA completed today, UC not completed, no further treatment indicated    After care of recent Acute deep vein thrombosis (DVT) of femoral vein of left lower extremity, improving, ongoing  -s/p Thrombectomy performed on 02/03 for persistent clot, with stent placement  -cardiologist is Dr. Simmons  -Provoked DVT in setting of immobility     -Eliquis initiated and will need OAC for the forseeable future   -continue Eliquis 10 mg p.o. b.i.d. x7 days for bolus dosing-end date 02/11, then changed to Eliquis 5 mg p.o. b.i.d. indefinitely until follow-up with Cardiology  -3/5 continue newly decreased Eliquis 2.5 mg p.o. b.i.d. per Cardiology appointment on 03/03, continue compression stockings, lower extremity ultrasound completed at facility, awaiting recommendations from Cardiology   -4/3 continue Eliquis per Cardiology recs    Continued    Epilepsy, chronic, stable  MDD with recurrent episodes, ongoing  -Continue home mirtazapine 7.5mg HS.  -Continue phenobarb, Trileptal  -2/6 recent phenobarb level 12.4-low, initiate redraw to verify and may increase dose  -2/14 initiate increase in phenobarbital to 97.2 mg p.o. Q evening, initiate repeat phenobarbital level  in 2 weeks, phenobarbital level 12.1, monitor for seizure activity  -3/5 continue increased phenobarbital, will monitor level for efficacy    Decreased Appetite, ongoing  -Continue Remeron , HS 2 oz BID    Hypoalbuminemia, ongoing  Hyponatremia, ongoing  Hypophosphatemia, improved  Hypomagnesemia, improved  Hypokalemia, improved  -continue  Mg replacement  -continue ProStat, vitamin/mineral replacement    Vitamin D Deficiency, stable  -most recent vitamin-D level 16 02/2019, 22.2 1/2020  -Continue Vitamin D 3 replacement + Ca/Vit D supplement  -2/6 Most recent level 22.2, improving on supplementation    Acquired hypothyroidism, stable  -most recent TSH 0.507 11/2019 (10.32 previously)  -Continue home levothyroxine 125 mcg before breakfast.  -2/6 TSH 4.12, will repeat after acute DVT tx resolved due to inflammation    Recent Hx of Colon necrosis, improved   S/P sigmoidectomy with ostomy placement 10/29/19  E. coli bacteremia, resolved  -Previously Treated with Keflex and metronidazole per ID   -Continue ostomy care    DLBCL (diffuse large B cell lymphoma)  Anemia due to antineoplastic chemotherapy  Thrombocytopenia, stable  -most recent H&H 9/28 11/2019  -Transfused pRBCs during admit  -Patient has RU chest wall port  -Follow up with hem onc as outpatient.    Slow transit constipation, ongoing  -Continue polyethylene glycol BID (home med)    Debility r/t recent DVT, immobility, ongoing  - Encourage mobility, OOB in chair, and early ambulation as appropriate  - Fall precautions   - Monitor for bowel and bladder dysfunction  - Monitor for and prevent skin breakdown and pressure ulcers  - Continue DVT prophylaxis with  Eliquis  - Pain control: Tylenol PRN        Outpatient MDs: Her primary care physician is Dr. Jose Torres. Her pain management specialist is Dr. Chirag Bates. Her neurologist is Dr. Gamal Lock.       Recent Lab work: 1/2020  TSH 4.12 high, phenobarb level 12.4 low, Vitamin D 22.2 low    No  future appointments.    Extended time visit:  Total time:67 min  Start Time:1000  End Time:1107  Non face-to-face time:36 minutes  Description of time:  Review over provider notes from ED visit, pertinent testing/imaging, changes to plan of care      Anat Dawkins NP          Patient note was created using MModal Dictation.  Any errors in syntax or even information may not have been identified and edited on initial review prior to signing this note.

## 2020-04-03 ENCOUNTER — DOCUMENTATION ONLY (OUTPATIENT)
Dept: HEPATOLOGY | Facility: HOSPITAL | Age: 85
End: 2020-04-03

## 2020-05-06 NOTE — PROGRESS NOTES
[facility]                                                                                     Long Term Care                                                                 Progress Note        Visit Date: 5/7/2020    Chief Complaint:  Chronic condition management, BP/HR/O2 monitoring    HPI:   Patient seen today to monitor chronic conditions.  She was recently treated for UTI with resolution in symptoms.  She has had no incidents since last visit on 04/03.  /74, HR 82, O2 sats 95% on room air, stable. Patient has no complaints today during visit. No other concerns per nursing.  Will continue to monitor and treat of chronic conditions.  No changes needed to plan of care at this time.    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    Past Surgical History: Patient has a past surgical history that includes Cholecystectomy; Appendectomy; Tonsillectomy; Vaginal hysterectomy w/ anterior and posterior vaginal repair; Cataract extraction, bilateral (Bilateral); Hysterectomy; Eye surgery (Bilateral); Portacath placement (Right, 09/2016); Back surgery (1988); Back surgery (02/2013); Renal artery stent (Left, 07/19/2017); Small intestine surgery (08/23/2016); Sigmoidectomy (10/29/2019); Thrombectomy (N/A, 2/3/2020); and Percutaneous transluminal angioplasty (PTA) of peripheral vessel (N/A, 2/3/2020).    Social History: Patient reports that she has never smoked. She has never used smokeless tobacco. She reports that she does not drink alcohol or use drugs.    Family History: family history includes Heart attack in her father; Hypertension in her father;  Pancreatic cancer in her mother.    Allergies: Patient is allergic to adhesive; penicillins; tramadol; avelox [moxifloxacin]; amoxil [amoxicillin]; aspridrox [aspirin, buffered]; codeine; keflex [cephalexin]; norvasc [amlodipine]; red dye; robitussin [guaifenesin]; sulfa (sulfonamide antibiotics); tylenol [acetaminophen]; and vicks vaporub [camphor-eucalyptus oil-menthol].    ROS  Constitutional: Negative for fever or fatigue  Eyes: Negative for blurred vision, double vision and discharge.   Respiratory: Negative for cough, shortness of breath and wheezing.    Cardiovascular: Negative for chest pain, palpitations, and +leg swelling.   Gastrointestinal: Negative for abdominal pain, constipation, diarrhea, + intermittent nausea and vomiting.   Genitourinary: Negative for dysuria, frequency and urgency.   Musculoskeletal:  + generalized weakness. Negative for back pain and myalgias  Skin: Negative for itching and rash  Neurological: Negative for dizziness, speech change, and headaches.   Psychiatric/Behavioral: Negative for depression. The patient is not nervous/anxious.      PEx     Constitutional: Patient appears well-developed and in no distress   Head: Normocephalic and atraumatic.   Eyes: Pupils are equal, round, and reactive to light.   Neck: Normal range of motion. Neck supple.   Cardiovascular: Normal rate, regular rhythm and normal heart sounds, + LLE edema +3.    Pulmonary/Chest: Effort normal and breath sounds are clear  Abdominal: Soft. Bowel sounds are normal, +ostomy with brown stool noted  Musculoskeletal: Normal range of motion  Neurological: Alert and oriented to person, place, and time.   Psychiatric: Normal mood and affect. Behavior is normal  Skin: Skin is warm and dry    Assessment and Plan:    Peripheral vascular disease, unspecified, chronic  Remote Hx of MI   HTN with Hyperlipidemia, stable  -LDL 97 11/2019  -Continue Lisinopril, Coreg ( d/anabella during admit), pravastatin, Clonidine QD  -5/7  Continue current regimen    After care of recent Acute deep vein thrombosis (DVT) of femoral vein of left lower extremity, improving, ongoing  -s/p Thrombectomy performed on 02/03 for persistent clot, with stent placement  -cardiologist is Dr. Simmons  -Provoked DVT in setting of immobility     -Eliquis initiated and will need OAC for the forseeable future   -continue Eliquis 10 mg p.o. b.i.d. x7 days for bolus dosing-end date 02/11, then changed to Eliquis 5 mg p.o. b.i.d. indefinitely until follow-up with Cardiology  -3/5 continue newly decreased Eliquis 2.5 mg p.o. b.i.d. per Cardiology appointment on 03/03, continue compression stockings, lower extremity ultrasound completed at facility, awaiting recommendations from Cardiology   -5/7 continue Eliquis per Cardiology recs    Continued    Epilepsy, chronic, stable  MDD with recurrent episodes, ongoing  -Continue home mirtazapine 7.5mg HS.  -Continue phenobarb, Trileptal  -2/6 recent phenobarb level 12.4-low, initiate redraw to verify and may increase dose  -2/14 initiate increase in phenobarbital to 97.2 mg p.o. Q evening, initiate repeat phenobarbital level in 2 weeks, phenobarbital level 12.1, monitor for seizure activity  -3/5 continue increased phenobarbital, will monitor level for efficacy    Decreased Appetite, ongoing  -Continue Remeron , HS 2 oz BID    Hypoalbuminemia, ongoing  Hyponatremia, ongoing  Hypophosphatemia, improved  Hypomagnesemia, improved  Hypokalemia, improved  -continue  Mg replacement  -continue ProStat, vitamin/mineral replacement    Vitamin D Deficiency, stable  -most recent vitamin-D level 16 02/2019, 22.2 1/2020  -Continue Vitamin D 3 replacement + Ca/Vit D supplement  -2/6 Most recent level 22.2, improving on supplementation    Acquired hypothyroidism, stable  -most recent TSH 0.507 11/2019 (10.32 previously)  -Continue home levothyroxine 125 mcg before breakfast.  -2/6 TSH 4.12, will repeat after acute DVT tx resolved due to  inflammation    Recent Hx of Colon necrosis, improved   S/P sigmoidectomy with ostomy placement 10/29/19  E. coli bacteremia, resolved  -Previously Treated with Keflex and metronidazole per ID   -Continue ostomy care    DLBCL (diffuse large B cell lymphoma)  Anemia due to antineoplastic chemotherapy  Thrombocytopenia, stable  -most recent H&H 9/28 11/2019  -Transfused pRBCs during admit  -Patient has RU chest wall port  -Follow up with hem onc as outpatient.    Slow transit constipation, ongoing  -Continue polyethylene glycol BID (home med)    Debility r/t recent DVT, immobility, ongoing  - Encourage mobility, OOB in chair, and early ambulation as appropriate  - Fall precautions   - Monitor for bowel and bladder dysfunction  - Monitor for and prevent skin breakdown and pressure ulcers  - Continue DVT prophylaxis with  Eliquis  - Pain control: Tylenol PRN    Recent history of fever, vomiting, improved  Recent history of Urinary retention, resolved  Recent Hx of  Proteus mirabilis UTI, resolved  -Follow up with Urology as outpatient>voiding since gonzales D/Sai.   - Tx with trimethoprim 01/2020 in 03/2020        Outpatient MDs: Her primary care physician is Dr. Jose Torres. Her pain management specialist is Dr. Chirag Bates. Her neurologist is Dr. Gamal Lock.       Recent Lab work: 1/2020  TSH 4.12 high, phenobarb level 12.4 low, Vitamin D 22.2 low    No future appointments.        Anat Dawkins NP          Patient note was created using MModal Dictation.  Any errors in syntax or even information may not have been identified and edited on initial review prior to signing this note.

## 2020-05-07 ENCOUNTER — DOCUMENTATION ONLY (OUTPATIENT)
Dept: HEPATOLOGY | Facility: HOSPITAL | Age: 85
End: 2020-05-07

## 2020-06-08 NOTE — PROGRESS NOTES
[facility]                                                                                     Long Term Care                                                                 Progress Note        Visit Date: 6/9/2020    Chief Complaint:  Chronic condition management, BP/HR/O2 monitoring    HPI:   Patient seen today to monitor chronic conditions.  /84, HR 77, O2 sats 96 % on room air, stable.  Most recent weight 140.8 lb, will trend.  She was observed ambulating with therapy today during visit without difficulty.  Patient has no complaints today during visit. No other concerns per nursing.  Patient recently had elevated TSH, low phenobarbital level, likely needs repeat monitoring for chronic condition management.  Will continue to monitor and treat of chronic conditions.  No changes needed to plan of care at this time.  She continues to need a follow-up appointment with cardiology regarding recent DVT treatment, appointments put on hold due to current COVID-19 pandemic.    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    Past Surgical History: Patient has a past surgical history that includes Cholecystectomy; Appendectomy; Tonsillectomy; Vaginal hysterectomy w/ anterior and posterior vaginal repair; Cataract extraction, bilateral (Bilateral); Hysterectomy; Eye surgery (Bilateral); Portacath placement (Right, 09/2016); Back surgery (1988); Back surgery (02/2013); Renal artery stent (Left, 07/19/2017); Small intestine surgery (08/23/2016); Sigmoidectomy (10/29/2019); Thrombectomy (N/A, 2/3/2020); and Percutaneous transluminal angioplasty (PTA) of  peripheral vessel (N/A, 2/3/2020).    Social History: Patient reports that she has never smoked. She has never used smokeless tobacco. She reports that she does not drink alcohol or use drugs.    Family History: family history includes Heart attack in her father; Hypertension in her father; Pancreatic cancer in her mother.    Allergies: Patient is allergic to adhesive; penicillins; tramadol; avelox [moxifloxacin]; amoxil [amoxicillin]; aspridrox [aspirin, buffered]; codeine; keflex [cephalexin]; norvasc [amlodipine]; red dye; robitussin [guaifenesin]; sulfa (sulfonamide antibiotics); tylenol [acetaminophen]; and vicks vaporub [camphor-eucalyptus oil-menthol].    ROS  Constitutional: Negative for fever or fatigue  Eyes: Negative for blurred vision, double vision and discharge.   Respiratory: Negative for cough, shortness of breath and wheezing.    Cardiovascular: Negative for chest pain, palpitations, and +leg swelling.   Gastrointestinal: Negative for abdominal pain, constipation, diarrhea, + intermittent nausea and vomiting.   Genitourinary: Negative for dysuria, frequency and urgency.   Musculoskeletal:  + generalized weakness. Negative for back pain and myalgias  Skin: Negative for itching and rash  Neurological: Negative for dizziness, speech change, and headaches.   Psychiatric/Behavioral: Negative for depression. The patient is not nervous/anxious.      PEx     Constitutional: Patient appears well-developed and in no distress   Head: Normocephalic and atraumatic.   Eyes: Pupils are equal, round, and reactive to light.   Neck: Normal range of motion. Neck supple.   Cardiovascular: Normal rate, regular rhythm and normal heart sounds, + LLE edema +3.    Pulmonary/Chest: Effort normal and breath sounds are clear  Abdominal: Soft. Bowel sounds are normal, +ostomy with brown stool noted  Musculoskeletal: Normal range of motion  Neurological: Alert and oriented to person, place, and time.   Psychiatric: Normal mood  and affect. Behavior is normal  Skin: Skin is warm and dry    Assessment and Plan:    Peripheral vascular disease, unspecified, chronic  Remote Hx of MI   HTN with Hyperlipidemia, stable  -LDL 97 11/2019  -Continue Lisinopril, Coreg ( d/anabella during admit), pravastatin, Clonidine QD  -6/9 Continue current regimen    After care of recent Acute deep vein thrombosis (DVT) of femoral vein of left lower extremity, improving, ongoing  -s/p Thrombectomy performed on 02/03 for persistent clot, with stent placement  -cardiologist is Dr. Simmons  -Provoked DVT in setting of immobility     -Eliquis initiated and will need OAC for the forseeable future   -continue Eliquis 10 mg p.o. b.i.d. x7 days for bolus dosing-end date 02/11, then changed to Eliquis 5 mg p.o. b.i.d. indefinitely until follow-up with Cardiology  -3/5 continue newly decreased Eliquis 2.5 mg p.o. b.i.d. per Cardiology appointment on 03/03, continue compression stockings, lower extremity ultrasound completed at facility, awaiting recommendations from Cardiology   -6/9 continue Eliquis per Cardiology recs when outpatient appointment can be made    Epilepsy, chronic, stable  MDD with recurrent episodes, ongoing  -Continue home mirtazapine 7.5mg HS.  -Continue phenobarb, Trileptal  -2/6 recent phenobarb level 12.4-low, initiate redraw to verify and may increase dose  -2/14 initiate increase in phenobarbital to 97.2 mg p.o. Q evening, initiate repeat phenobarbital level in 2 weeks, phenobarbital level 12.1, monitor for seizure activity  -6/9 initiate phenobarbital level, continue increased phenobarbital    Decreased Appetite, ongoing  -6/9 Continue Remeron , HS 2 oz BID    Hypoalbuminemia, ongoing  Hyponatremia, ongoing  Hypophosphatemia, improved  Hypomagnesemia, improved  Hypokalemia, improved  -continue  Mg replacement  -6/9 continue ProStat, vitamin/mineral replacement    Vitamin D Deficiency, stable  -most recent vitamin-D level 16 02/2019, 22.2  1/2020  -Continue Vitamin D 3 replacement + Ca/Vit D supplement  -6/9 continue current regimen    Acquired hypothyroidism, stable  -most recent TSH 0.507 11/2019 (10.32 previously)  -Continue home levothyroxine 125 mcg before breakfast.  -2/6 TSH 4.12, will repeat after acute DVT tx resolved due to inflammation  -6/9 initiate repeat TSH with free T4    Recent Hx of Colon necrosis, improved   S/P sigmoidectomy with ostomy placement 10/29/19  E. coli bacteremia, resolved  -Previously Treated with Keflex and metronidazole per ID   -6/9 Continue ostomy care    DLBCL (diffuse large B cell lymphoma)  Anemia due to antineoplastic chemotherapy  Thrombocytopenia, stable  -most recent H&H 9/28 11/2019  -Transfused pRBCs during admit  -Patient has RU chest wall port  -6/9 Follow up with hem onc as outpatient.    Slow transit constipation, ongoing  -6/9 Continue polyethylene glycol BID (home med)    Debility r/t recent DVT, immobility, ongoing  - Encourage mobility, OOB in chair, and early ambulation as appropriate  - Fall precautions   - Monitor for bowel and bladder dysfunction  - Monitor for and prevent skin breakdown and pressure ulcers  - Continue DVT prophylaxis with  Eliquis  - Pain control: Tylenol PRN    Recent history of fever, vomiting, improved  Recent history of Urinary retention, resolved  Recent Hx of  Proteus mirabilis UTI, resolved  -Follow up with Urology as outpatient>voiding since gonzales D/Sai.   - Tx with trimethoprim 01/2020 in 03/2020        Outpatient MDs: Her pain management specialist is Dr. Chirag Bates. Her neurologist is Dr. Gamal Lock.       Recent Lab work: 1/2020  TSH 4.12 high, phenobarb level 12.4 low, Vitamin D 22.2 low    No future appointments.        Anat Dawkins NP          Patient note was created using MModal Dictation.  Any errors in syntax or even information may not have been identified and edited on initial review prior to signing this note.

## 2020-06-09 ENCOUNTER — DOCUMENTATION ONLY (OUTPATIENT)
Dept: HEPATOLOGY | Facility: HOSPITAL | Age: 85
End: 2020-06-09

## 2020-06-18 ENCOUNTER — DOCUMENTATION ONLY (OUTPATIENT)
Dept: HEPATOLOGY | Facility: HOSPITAL | Age: 85
End: 2020-06-18

## 2020-06-18 NOTE — PROGRESS NOTES
[facility]                                                                                     Long Term Care                                                                 Progress Note        Visit Date: 6/18/2020    Chief Complaint:  Lab review, medication refill, BP/HR/O2 monitoring    HPI:   Patient seen today to review recently ordered lab work.  TSH and free T4 recently ordered to monitor hypothyroidism, TSH 4.74, T4 1.06, within normal limits.  Continue on current Synthroid dose.  Phenobarbital level ordered, reviewed today:  Resulted as 18.6-stable after increase in dosage due to subtherapeutic levels.  Nursing reporting that patient needs refill on phenobarbital for management of epilepsy disorder.  /68, HR 74, O2 sats 98% on room air, stable. No other concerns per nursing.  No complaints from patient.  Patient still needs to follow-up with cardiology regarding recent DVT and treatment with Eliquis. Will continue to monitor and treat of chronic conditions.      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    Past Surgical History: Patient has a past surgical history that includes Cholecystectomy; Appendectomy; Tonsillectomy; Vaginal hysterectomy w/ anterior and posterior vaginal repair; Cataract extraction, bilateral (Bilateral); Hysterectomy; Eye surgery (Bilateral); Portacath placement (Right, 09/2016); Back surgery (1988); Back surgery (02/2013); Renal artery stent (Left, 07/19/2017); Small intestine surgery (08/23/2016); Sigmoidectomy (10/29/2019); Thrombectomy (N/A, 2/3/2020); and Percutaneous transluminal angioplasty (PTA) of  peripheral vessel (N/A, 2/3/2020).    Social History: Patient reports that she has never smoked. She has never used smokeless tobacco. She reports that she does not drink alcohol or use drugs.    Family History: family history includes Heart attack in her father; Hypertension in her father; Pancreatic cancer in her mother.    Allergies: Patient is allergic to adhesive; penicillins; tramadol; avelox [moxifloxacin]; amoxil [amoxicillin]; aspridrox [aspirin, buffered]; codeine; keflex [cephalexin]; norvasc [amlodipine]; red dye; robitussin [guaifenesin]; sulfa (sulfonamide antibiotics); tylenol [acetaminophen]; and vicks vaporub [camphor-eucalyptus oil-menthol].    ROS  Constitutional: Negative for fever or fatigue  Eyes: Negative for blurred vision, double vision and discharge.   Respiratory: Negative for cough, shortness of breath and wheezing.    Cardiovascular: Negative for chest pain, palpitations, and +leg swelling.   Gastrointestinal: Negative for abdominal pain, constipation, diarrhea, + intermittent nausea and vomiting.   Genitourinary: Negative for dysuria, frequency and urgency.   Musculoskeletal:  + generalized weakness. Negative for back pain and myalgias  Skin: Negative for itching and rash  Neurological: Negative for dizziness, speech change, and headaches.   Psychiatric/Behavioral: Negative for depression. The patient is not nervous/anxious.      PEx     Constitutional: Patient appears well-developed and in no distress   Head: Normocephalic and atraumatic.   Eyes: Pupils are equal, round, and reactive to light.   Neck: Normal range of motion. Neck supple.   Cardiovascular: Normal rate, regular rhythm and normal heart sounds, + LLE edema +3.    Pulmonary/Chest: Effort normal and breath sounds are clear  Abdominal: Soft. Bowel sounds are normal, +ostomy with brown stool noted  Musculoskeletal: Normal range of motion  Neurological: Alert and oriented to person, place, and time.   Psychiatric: Normal mood  and affect. Behavior is normal  Skin: Skin is warm and dry    Assessment and Plan:    Epilepsy, chronic, stable  MDD with recurrent episodes, ongoing  -most recent phenobarbital level 18.6 on 06/11/2020-stable  -Continue home mirtazapine 7.5mg HS.  -Continue phenobarb, Trileptal  -2/6 recent phenobarb level 12.4-low, initiate redraw to verify and may increase dose  -2/14 initiate increase in phenobarbital to 97.2 mg p.o. Q evening, initiate repeat phenobarbital level in 2 weeks, phenobarbital level 12.1, monitor for seizure activity  -6/18 initiate a Rx refill for phenobarbital, continue increased dose of phenobarbital, phenobarb level 18.6-stable    Acquired hypothyroidism, stable  -most recent TSH 4.74, free T4 1.06-stable on 06/11/2020  -Continue home levothyroxine 125 mcg before breakfast.  -2/6 TSH 4.12, will repeat after acute DVT tx resolved due to inflammation  -6/18 TSH and free T4 stable, continue Synthroid    Peripheral vascular disease, unspecified, chronic  Remote Hx of MI   HTN with Hyperlipidemia, stable  -LDL 97 11/2019  -Continue Lisinopril, Coreg ( d/anabella during admit), pravastatin, Clonidine QD  -6/18 Continue current regimen    Recent Hx of Colon necrosis, improved   S/P sigmoidectomy with ostomy placement 10/29/19  E. coli bacteremia, resolved  -Previously Treated with Keflex and metronidazole per ID   -6/18 Continue ostomy care    Continued    After care of recent Acute deep vein thrombosis (DVT) of femoral vein of left lower extremity, improving, ongoing  -s/p Thrombectomy performed on 02/03 for persistent clot, with stent placement  -cardiologist is Dr. Simmons  -Provoked DVT in setting of immobility     -Eliquis initiated and will need OAC for the forseeable future   -continue Eliquis 10 mg p.o. b.i.d. x7 days for bolus dosing-end date 02/11, then changed to Eliquis 5 mg p.o. b.i.d. indefinitely until follow-up with Cardiology  -3/5 continue newly decreased Eliquis 2.5 mg p.o. b.i.d. per  Cardiology appointment on 03/03, continue compression stockings, lower extremity ultrasound completed at facility, awaiting recommendations from Cardiology   - continue Eliquis per Cardiology recs when outpatient appointment can be made    Decreased Appetite, ongoing  - Continue Remeron , HS 2 oz BID    Hypoalbuminemia, ongoing  Hyponatremia, ongoing  Hypophosphatemia, improved  Hypomagnesemia, improved  Hypokalemia, improved  -continue  Mg replacement  -continue ProStat, vitamin/mineral replacement    Vitamin D Deficiency, stable  -most recent vitamin-D level 16 02/2019, 22.2 1/2020  -Continue Vitamin D 3 replacement + Ca/Vit D supplement  -continue current regimen    DLBCL (diffuse large B cell lymphoma)  Anemia due to antineoplastic chemotherapy  Thrombocytopenia, stable  -most recent H&H 9/28 11/2019  -Transfused pRBCs during admit  -Patient has RU chest wall port  -Follow up with hem onc as outpatient.    Slow transit constipation, ongoing  -Continue polyethylene glycol BID (home med)    Debility r/t recent DVT, immobility, ongoing  - Encourage mobility, OOB in chair, and early ambulation as appropriate  - Fall precautions   - Monitor for bowel and bladder dysfunction  - Monitor for and prevent skin breakdown and pressure ulcers  - Continue DVT prophylaxis with  Eliquis  - Pain control: Tylenol PRN      Recent Hx of  Proteus mirabilis UTI, resolved  - Tx with trimethoprim 01/2020 in 03/2020        Outpatient MDs: Her pain management specialist is Dr. Chirag Bates. Her neurologist is Dr. Gamal Lock.       Recent Lab work: 1/2020  TSH 4.12 high, phenobarb level 12.4 low, Vitamin D 22.2 low    No future appointments.        Anat Dawkins NP          Patient note was created using MModal Dictation.  Any errors in syntax or even information may not have been identified and edited on initial review prior to signing this note.

## 2020-07-14 DIAGNOSIS — G40.209 PARTIAL SYMPTOMATIC EPILEPSY WITH COMPLEX PARTIAL SEIZURES, NOT INTRACTABLE, WITHOUT STATUS EPILEPTICUS: ICD-10-CM

## 2020-07-14 RX ORDER — PHENOBARBITAL 97.2 MG/1
97.2 TABLET ORAL NIGHTLY
Qty: 30 TABLET | Refills: 5 | Status: SHIPPED | OUTPATIENT
Start: 2020-07-14 | End: 2021-02-07 | Stop reason: SDUPTHER

## 2020-08-13 ENCOUNTER — HOSPITAL ENCOUNTER (OUTPATIENT)
Facility: HOSPITAL | Age: 85
Discharge: NURSING, REHAB OR LONG-TERM CARE | End: 2020-08-15
Attending: EMERGENCY MEDICINE | Admitting: INTERNAL MEDICINE
Payer: MEDICARE

## 2020-08-13 DIAGNOSIS — Z51.5 PALLIATIVE CARE ENCOUNTER: ICD-10-CM

## 2020-08-13 DIAGNOSIS — Z71.89 GOALS OF CARE, COUNSELING/DISCUSSION: ICD-10-CM

## 2020-08-13 DIAGNOSIS — R55 SYNCOPE, UNSPECIFIED SYNCOPE TYPE: Primary | ICD-10-CM

## 2020-08-13 DIAGNOSIS — R07.9 CHEST PAIN: ICD-10-CM

## 2020-08-13 DIAGNOSIS — R60.0 BILATERAL LEG EDEMA: ICD-10-CM

## 2020-08-13 DIAGNOSIS — Z71.89 COUNSELING REGARDING ADVANCE CARE PLANNING AND GOALS OF CARE: ICD-10-CM

## 2020-08-13 DIAGNOSIS — R55 SYNCOPE: ICD-10-CM

## 2020-08-13 PROCEDURE — 84484 ASSAY OF TROPONIN QUANT: CPT

## 2020-08-13 PROCEDURE — 96374 THER/PROPH/DIAG INJ IV PUSH: CPT

## 2020-08-13 PROCEDURE — 96361 HYDRATE IV INFUSION ADD-ON: CPT

## 2020-08-13 PROCEDURE — 85025 COMPLETE CBC W/AUTO DIFF WBC: CPT

## 2020-08-13 PROCEDURE — 80053 COMPREHEN METABOLIC PANEL: CPT

## 2020-08-13 PROCEDURE — 93010 ELECTROCARDIOGRAM REPORT: CPT | Mod: ,,, | Performed by: INTERNAL MEDICINE

## 2020-08-13 PROCEDURE — 93010 EKG 12-LEAD: ICD-10-PCS | Mod: ,,, | Performed by: INTERNAL MEDICINE

## 2020-08-13 PROCEDURE — 99285 EMERGENCY DEPT VISIT HI MDM: CPT | Mod: 25

## 2020-08-13 PROCEDURE — 83880 ASSAY OF NATRIURETIC PEPTIDE: CPT

## 2020-08-13 PROCEDURE — 93005 ELECTROCARDIOGRAM TRACING: CPT

## 2020-08-14 PROBLEM — R55 SYNCOPE: Status: ACTIVE | Noted: 2020-08-14

## 2020-08-14 PROBLEM — I82.412 DVT OF DEEP FEMORAL VEIN, LEFT: Status: ACTIVE | Noted: 2020-08-14

## 2020-08-14 PROBLEM — I95.9 HYPOTENSION: Status: ACTIVE | Noted: 2020-08-14

## 2020-08-14 PROBLEM — I25.10 CORONARY ARTERY DISEASE: Status: ACTIVE | Noted: 2020-08-14

## 2020-08-14 PROBLEM — D64.9 CHRONIC ANEMIA: Status: ACTIVE | Noted: 2020-08-14

## 2020-08-14 LAB
ALBUMIN SERPL BCP-MCNC: 3.8 G/DL (ref 3.5–5.2)
ALBUMIN SERPL BCP-MCNC: 3.9 G/DL (ref 3.5–5.2)
ALP SERPL-CCNC: 79 U/L (ref 55–135)
ALP SERPL-CCNC: 89 U/L (ref 55–135)
ALT SERPL W/O P-5'-P-CCNC: 14 U/L (ref 10–44)
ALT SERPL W/O P-5'-P-CCNC: 15 U/L (ref 10–44)
ANION GAP SERPL CALC-SCNC: 10 MMOL/L (ref 8–16)
ANION GAP SERPL CALC-SCNC: 8 MMOL/L (ref 8–16)
AORTIC ROOT ANNULUS: 2.71 CM
AORTIC VALVE CUSP SEPERATION: 1.33 CM
AST SERPL-CCNC: 14 U/L (ref 10–40)
AST SERPL-CCNC: 16 U/L (ref 10–40)
AV INDEX (PROSTH): 0.63
AV MEAN GRADIENT: 5 MMHG
AV PEAK GRADIENT: 8 MMHG
AV VALVE AREA: 1.84 CM2
AV VELOCITY RATIO: 0.6
BASOPHILS # BLD AUTO: 0.02 K/UL (ref 0–0.2)
BASOPHILS # BLD AUTO: 0.02 K/UL (ref 0–0.2)
BASOPHILS NFR BLD: 0.3 % (ref 0–1.9)
BASOPHILS NFR BLD: 0.4 % (ref 0–1.9)
BILIRUB SERPL-MCNC: 0.2 MG/DL (ref 0.1–1)
BILIRUB SERPL-MCNC: 0.2 MG/DL (ref 0.1–1)
BNP SERPL-MCNC: 28 PG/ML (ref 0–99)
BSA FOR ECHO PROCEDURE: 1.56 M2
BUN SERPL-MCNC: 23 MG/DL (ref 8–23)
BUN SERPL-MCNC: 24 MG/DL (ref 8–23)
CALCIUM SERPL-MCNC: 9.2 MG/DL (ref 8.7–10.5)
CALCIUM SERPL-MCNC: 9.5 MG/DL (ref 8.7–10.5)
CHLORIDE SERPL-SCNC: 100 MMOL/L (ref 95–110)
CHLORIDE SERPL-SCNC: 100 MMOL/L (ref 95–110)
CO2 SERPL-SCNC: 25 MMOL/L (ref 23–29)
CO2 SERPL-SCNC: 26 MMOL/L (ref 23–29)
CREAT SERPL-MCNC: 0.8 MG/DL (ref 0.5–1.4)
CREAT SERPL-MCNC: 0.8 MG/DL (ref 0.5–1.4)
CV ECHO LV RWT: 0.39 CM
DIFFERENTIAL METHOD: ABNORMAL
DIFFERENTIAL METHOD: ABNORMAL
DOP CALC AO PEAK VEL: 1.43 M/S
DOP CALC AO VTI: 34.61 CM
DOP CALC LVOT AREA: 2.9 CM2
DOP CALC LVOT DIAMETER: 1.93 CM
DOP CALC LVOT PEAK VEL: 0.86 M/S
DOP CALC LVOT STROKE VOLUME: 63.74 CM3
DOP CALCLVOT PEAK VEL VTI: 21.8 CM
E WAVE DECELERATION TIME: 217.04 MSEC
E/A RATIO: 0.73
E/E' RATIO: 10.38 M/S
ECHO LV POSTERIOR WALL: 0.81 CM (ref 0.6–1.1)
EOSINOPHIL # BLD AUTO: 0.1 K/UL (ref 0–0.5)
EOSINOPHIL # BLD AUTO: 0.1 K/UL (ref 0–0.5)
EOSINOPHIL NFR BLD: 1 % (ref 0–8)
EOSINOPHIL NFR BLD: 2 % (ref 0–8)
ERYTHROCYTE [DISTWIDTH] IN BLOOD BY AUTOMATED COUNT: 12.5 % (ref 11.5–14.5)
ERYTHROCYTE [DISTWIDTH] IN BLOOD BY AUTOMATED COUNT: 12.7 % (ref 11.5–14.5)
EST. GFR  (AFRICAN AMERICAN): >60 ML/MIN/1.73 M^2
EST. GFR  (AFRICAN AMERICAN): >60 ML/MIN/1.73 M^2
EST. GFR  (NON AFRICAN AMERICAN): >60 ML/MIN/1.73 M^2
EST. GFR  (NON AFRICAN AMERICAN): >60 ML/MIN/1.73 M^2
FRACTIONAL SHORTENING: 29 % (ref 28–44)
GLUCOSE SERPL-MCNC: 135 MG/DL (ref 70–110)
GLUCOSE SERPL-MCNC: 92 MG/DL (ref 70–110)
HCT VFR BLD AUTO: 26.7 % (ref 37–48.5)
HCT VFR BLD AUTO: 31.6 % (ref 37–48.5)
HGB BLD-MCNC: 10.5 G/DL (ref 12–16)
HGB BLD-MCNC: 9.1 G/DL (ref 12–16)
IMM GRANULOCYTES # BLD AUTO: 0.03 K/UL (ref 0–0.04)
IMM GRANULOCYTES # BLD AUTO: 0.04 K/UL (ref 0–0.04)
IMM GRANULOCYTES NFR BLD AUTO: 0.6 % (ref 0–0.5)
IMM GRANULOCYTES NFR BLD AUTO: 0.6 % (ref 0–0.5)
INTERVENTRICULAR SEPTUM: 0.84 CM (ref 0.6–1.1)
IVRT: 79.92 MSEC
LA MAJOR: 4.88 CM
LA MINOR: 4.64 CM
LA WIDTH: 3.12 CM
LEFT ATRIUM SIZE: 3.63 CM
LEFT ATRIUM VOLUME INDEX: 30.3 ML/M2
LEFT ATRIUM VOLUME: 45.79 CM3
LEFT INTERNAL DIMENSION IN SYSTOLE: 2.92 CM (ref 2.1–4)
LEFT VENTRICLE DIASTOLIC VOLUME INDEX: 49.25 ML/M2
LEFT VENTRICLE DIASTOLIC VOLUME: 74.47 ML
LEFT VENTRICLE MASS INDEX: 67 G/M2
LEFT VENTRICLE SYSTOLIC VOLUME INDEX: 21.6 ML/M2
LEFT VENTRICLE SYSTOLIC VOLUME: 32.64 ML
LEFT VENTRICULAR INTERNAL DIMENSION IN DIASTOLE: 4.11 CM (ref 3.5–6)
LEFT VENTRICULAR MASS: 101.83 G
LV LATERAL E/E' RATIO: 9.22 M/S
LV SEPTAL E/E' RATIO: 11.86 M/S
LYMPHOCYTES # BLD AUTO: 0.8 K/UL (ref 1–4.8)
LYMPHOCYTES # BLD AUTO: 1.9 K/UL (ref 1–4.8)
LYMPHOCYTES NFR BLD: 11.4 % (ref 18–48)
LYMPHOCYTES NFR BLD: 34.1 % (ref 18–48)
MAGNESIUM SERPL-MCNC: 1.7 MG/DL (ref 1.6–2.6)
MCH RBC QN AUTO: 34.3 PG (ref 27–31)
MCH RBC QN AUTO: 34.5 PG (ref 27–31)
MCHC RBC AUTO-ENTMCNC: 33.2 G/DL (ref 32–36)
MCHC RBC AUTO-ENTMCNC: 34.1 G/DL (ref 32–36)
MCV RBC AUTO: 101 FL (ref 82–98)
MCV RBC AUTO: 104 FL (ref 82–98)
MONOCYTES # BLD AUTO: 0.5 K/UL (ref 0.3–1)
MONOCYTES # BLD AUTO: 0.5 K/UL (ref 0.3–1)
MONOCYTES NFR BLD: 6.9 % (ref 4–15)
MONOCYTES NFR BLD: 9.5 % (ref 4–15)
MV PEAK A VEL: 1.13 M/S
MV PEAK E VEL: 0.83 M/S
MV STENOSIS PRESSURE HALF TIME: 62.94 MS
MV VALVE AREA P 1/2 METHOD: 3.5 CM2
NEUTROPHILS # BLD AUTO: 2.9 K/UL (ref 1.8–7.7)
NEUTROPHILS # BLD AUTO: 5.8 K/UL (ref 1.8–7.7)
NEUTROPHILS NFR BLD: 53.4 % (ref 38–73)
NEUTROPHILS NFR BLD: 79.8 % (ref 38–73)
NRBC BLD-RTO: 0 /100 WBC
NRBC BLD-RTO: 0 /100 WBC
PHOSPHATE SERPL-MCNC: 3.9 MG/DL (ref 2.7–4.5)
PISA TR MAX VEL: 2.51 M/S
PLATELET # BLD AUTO: 158 K/UL (ref 150–350)
PLATELET # BLD AUTO: 165 K/UL (ref 150–350)
PMV BLD AUTO: 10.1 FL (ref 9.2–12.9)
PMV BLD AUTO: 9.5 FL (ref 9.2–12.9)
POTASSIUM SERPL-SCNC: 4.1 MMOL/L (ref 3.5–5.1)
POTASSIUM SERPL-SCNC: 4.8 MMOL/L (ref 3.5–5.1)
PROT SERPL-MCNC: 6.3 G/DL (ref 6–8.4)
PROT SERPL-MCNC: 6.8 G/DL (ref 6–8.4)
PULM VEIN S/D RATIO: 1.61
PV PEAK D VEL: 0.49 M/S
PV PEAK S VEL: 0.79 M/S
PV PEAK VELOCITY: 0.89 CM/S
RA MAJOR: 4.7 CM
RA PRESSURE: 3 MMHG
RA WIDTH: 2.36 CM
RBC # BLD AUTO: 2.65 M/UL (ref 4–5.4)
RBC # BLD AUTO: 3.04 M/UL (ref 4–5.4)
RIGHT VENTRICULAR END-DIASTOLIC DIMENSION: 2.49 CM
SARS-COV-2 RDRP RESP QL NAA+PROBE: NEGATIVE
SODIUM SERPL-SCNC: 133 MMOL/L (ref 136–145)
SODIUM SERPL-SCNC: 136 MMOL/L (ref 136–145)
TDI LATERAL: 0.09 M/S
TDI SEPTAL: 0.07 M/S
TDI: 0.08 M/S
TR MAX PG: 25 MMHG
TROPONIN I SERPL DL<=0.01 NG/ML-MCNC: <0.006 NG/ML (ref 0–0.03)
TROPONIN I SERPL DL<=0.01 NG/ML-MCNC: <0.006 NG/ML (ref 0–0.03)
TV REST PULMONARY ARTERY PRESSURE: 28 MMHG
WBC # BLD AUTO: 5.45 K/UL (ref 3.9–12.7)
WBC # BLD AUTO: 7.27 K/UL (ref 3.9–12.7)

## 2020-08-14 PROCEDURE — G0378 HOSPITAL OBSERVATION PER HR: HCPCS

## 2020-08-14 PROCEDURE — 80053 COMPREHEN METABOLIC PANEL: CPT

## 2020-08-14 PROCEDURE — 85025 COMPLETE CBC W/AUTO DIFF WBC: CPT

## 2020-08-14 PROCEDURE — 63600175 PHARM REV CODE 636 W HCPCS: Performed by: EMERGENCY MEDICINE

## 2020-08-14 PROCEDURE — 25000003 PHARM REV CODE 250: Performed by: NURSE PRACTITIONER

## 2020-08-14 PROCEDURE — 36415 COLL VENOUS BLD VENIPUNCTURE: CPT

## 2020-08-14 PROCEDURE — 84484 ASSAY OF TROPONIN QUANT: CPT

## 2020-08-14 PROCEDURE — 94761 N-INVAS EAR/PLS OXIMETRY MLT: CPT

## 2020-08-14 PROCEDURE — 25500020 PHARM REV CODE 255: Performed by: EMERGENCY MEDICINE

## 2020-08-14 PROCEDURE — U0002 COVID-19 LAB TEST NON-CDC: HCPCS

## 2020-08-14 PROCEDURE — 99215 OFFICE O/P EST HI 40 MIN: CPT | Mod: ,,, | Performed by: NURSE PRACTITIONER

## 2020-08-14 PROCEDURE — 99497 ADVNCD CARE PLAN 30 MIN: CPT | Mod: ,,, | Performed by: NURSE PRACTITIONER

## 2020-08-14 PROCEDURE — 99215 PR OFFICE/OUTPT VISIT, EST, LEVL V, 40-54 MIN: ICD-10-PCS | Mod: ,,, | Performed by: NURSE PRACTITIONER

## 2020-08-14 PROCEDURE — 99497 PR ADVNCD CARE PLAN 30 MIN: ICD-10-PCS | Mod: ,,, | Performed by: NURSE PRACTITIONER

## 2020-08-14 PROCEDURE — 83735 ASSAY OF MAGNESIUM: CPT

## 2020-08-14 PROCEDURE — 25000003 PHARM REV CODE 250: Performed by: INTERNAL MEDICINE

## 2020-08-14 PROCEDURE — 84100 ASSAY OF PHOSPHORUS: CPT

## 2020-08-14 PROCEDURE — 25000003 PHARM REV CODE 250: Performed by: EMERGENCY MEDICINE

## 2020-08-14 RX ORDER — CHOLECALCIFEROL (VITAMIN D3) 25 MCG
1000 TABLET ORAL DAILY
Status: DISCONTINUED | OUTPATIENT
Start: 2020-08-15 | End: 2020-08-15 | Stop reason: HOSPADM

## 2020-08-14 RX ORDER — CLONIDINE HYDROCHLORIDE 0.1 MG/1
0.1 TABLET ORAL DAILY
Qty: 30 TABLET | Refills: 11
Start: 2020-08-14 | End: 2020-08-14 | Stop reason: HOSPADM

## 2020-08-14 RX ORDER — PHENOBARBITAL 32.4 MG/1
97.2 TABLET ORAL NIGHTLY
Status: DISCONTINUED | OUTPATIENT
Start: 2020-08-14 | End: 2020-08-15 | Stop reason: HOSPADM

## 2020-08-14 RX ORDER — ACETAMINOPHEN 325 MG/1
650 TABLET ORAL EVERY 4 HOURS PRN
Status: DISCONTINUED | OUTPATIENT
Start: 2020-08-15 | End: 2020-08-15 | Stop reason: HOSPADM

## 2020-08-14 RX ORDER — CLONIDINE HYDROCHLORIDE 0.1 MG/1
0.1 TABLET ORAL 2 TIMES DAILY
Status: DISCONTINUED | OUTPATIENT
Start: 2020-08-15 | End: 2020-08-15 | Stop reason: HOSPADM

## 2020-08-14 RX ORDER — CLONIDINE HYDROCHLORIDE 0.1 MG/1
0.1 TABLET ORAL DAILY
Status: DISCONTINUED | OUTPATIENT
Start: 2020-08-14 | End: 2020-08-14

## 2020-08-14 RX ORDER — POLYETHYLENE GLYCOL 3350 17 G/17G
17 POWDER, FOR SOLUTION ORAL 2 TIMES DAILY PRN
Status: DISCONTINUED | OUTPATIENT
Start: 2020-08-14 | End: 2020-08-15 | Stop reason: HOSPADM

## 2020-08-14 RX ORDER — ONDANSETRON 4 MG/1
4 TABLET, ORALLY DISINTEGRATING ORAL EVERY 8 HOURS PRN
Status: DISCONTINUED | OUTPATIENT
Start: 2020-08-15 | End: 2020-08-15 | Stop reason: HOSPADM

## 2020-08-14 RX ORDER — TALC
6 POWDER (GRAM) TOPICAL NIGHTLY PRN
Status: DISCONTINUED | OUTPATIENT
Start: 2020-08-14 | End: 2020-08-15 | Stop reason: HOSPADM

## 2020-08-14 RX ORDER — LANOLIN ALCOHOL/MO/W.PET/CERES
800 CREAM (GRAM) TOPICAL DAILY
Refills: 0 | COMMUNITY
Start: 2020-08-15

## 2020-08-14 RX ORDER — PRAVASTATIN SODIUM 10 MG/1
20 TABLET ORAL DAILY
Status: DISCONTINUED | OUTPATIENT
Start: 2020-08-14 | End: 2020-08-15 | Stop reason: HOSPADM

## 2020-08-14 RX ORDER — ONDANSETRON 4 MG/1
4 TABLET, ORALLY DISINTEGRATING ORAL EVERY 8 HOURS PRN
Start: 2020-08-15 | End: 2022-05-20 | Stop reason: DRUGHIGH

## 2020-08-14 RX ORDER — ACETAMINOPHEN 325 MG/1
650 TABLET ORAL EVERY 4 HOURS PRN
Refills: 0 | Status: ON HOLD
Start: 2020-08-15 | End: 2020-09-14 | Stop reason: SDUPTHER

## 2020-08-14 RX ORDER — OXCARBAZEPINE 150 MG/1
300 TABLET, FILM COATED ORAL NIGHTLY
Status: DISCONTINUED | OUTPATIENT
Start: 2020-08-14 | End: 2020-08-15 | Stop reason: HOSPADM

## 2020-08-14 RX ORDER — LANOLIN ALCOHOL/MO/W.PET/CERES
800 CREAM (GRAM) TOPICAL DAILY
Status: DISCONTINUED | OUTPATIENT
Start: 2020-08-15 | End: 2020-08-15 | Stop reason: HOSPADM

## 2020-08-14 RX ORDER — ONDANSETRON 2 MG/ML
4 INJECTION INTRAMUSCULAR; INTRAVENOUS EVERY 6 HOURS PRN
Status: DISCONTINUED | OUTPATIENT
Start: 2020-08-14 | End: 2020-08-15 | Stop reason: HOSPADM

## 2020-08-14 RX ORDER — MIRTAZAPINE 7.5 MG/1
7.5 TABLET, FILM COATED ORAL NIGHTLY
Status: DISCONTINUED | OUTPATIENT
Start: 2020-08-15 | End: 2020-08-15 | Stop reason: HOSPADM

## 2020-08-14 RX ORDER — CLONIDINE HYDROCHLORIDE 0.1 MG/1
0.1 TABLET ORAL 2 TIMES DAILY
Qty: 60 TABLET | Refills: 11 | Status: ON HOLD | OUTPATIENT
Start: 2020-08-15 | End: 2020-09-15 | Stop reason: HOSPADM

## 2020-08-14 RX ORDER — DIPHENHYDRAMINE HYDROCHLORIDE 50 MG/ML
50 INJECTION INTRAMUSCULAR; INTRAVENOUS
Status: COMPLETED | OUTPATIENT
Start: 2020-08-14 | End: 2020-08-14

## 2020-08-14 RX ADMIN — PHENOBARBITAL 97.2 MG: 32.4 TABLET ORAL at 09:08

## 2020-08-14 RX ADMIN — DIPHENHYDRAMINE HYDROCHLORIDE 50 MG: 50 INJECTION INTRAMUSCULAR; INTRAVENOUS at 03:08

## 2020-08-14 RX ADMIN — LEVOTHYROXINE SODIUM 125 MCG: 75 TABLET ORAL at 06:08

## 2020-08-14 RX ADMIN — APIXABAN 5 MG: 5 TABLET, FILM COATED ORAL at 08:08

## 2020-08-14 RX ADMIN — CLONIDINE HYDROCHLORIDE 0.1 MG: 0.1 TABLET ORAL at 09:08

## 2020-08-14 RX ADMIN — OXCARBAZEPINE 300 MG: 150 TABLET, FILM COATED ORAL at 09:08

## 2020-08-14 RX ADMIN — IOHEXOL 100 ML: 350 INJECTION, SOLUTION INTRAVENOUS at 03:08

## 2020-08-14 RX ADMIN — PRAVASTATIN SODIUM 20 MG: 10 TABLET ORAL at 08:08

## 2020-08-14 RX ADMIN — SODIUM CHLORIDE 500 ML: 0.9 INJECTION, SOLUTION INTRAVENOUS at 02:08

## 2020-08-14 RX ADMIN — APIXABAN 5 MG: 5 TABLET, FILM COATED ORAL at 09:08

## 2020-08-14 NOTE — HPI
"84 y.o. female with past medical history of peripheral vascular disease, renovascular hypertension, renal artery stenosis status post left renal artery stent placement on 7/19/17, coronary artery disease with history of myocardial infarction, paroxysmal atrial fibrillation, diffuse large B cell lymphoma, anemia, epilepsy, hypothyroidism, chronic hyponatremia, depression, lumbar and sacroiliac joint osteoarthritis with chronic low back pain and history of lumbar spine surgery in 2013, slow transit constipation, history of appendectomy, history of cholecystectomy, history hysterectomy, history of small bowel obstruction status post small bowel resection on 8/23/16, history of sigmoid colectomy with end colostomy on 10/29/19. She lives at [facility] in [place], [place]. She is wheelchair bound. Her primary care physician is Dr. Jose Torres. Her pain management specialist is Dr. Chirag Bates. Her neurologist is Dr. Gamal Lock. Her cardiologist is Dr. Timmy Simmons.    Palliative medicine met with patient today. [facility] discussion. Patient in NAD. States, "My living will has my wishes." Living will states in second paragraph no life sustaining treatment." Spoke with daughter Beatris who POA. Discussed patient current illness. Daughter stated, "My mom had this before a while back. I hope they can find what is going on with her. I know she does not want to be on a machine to live." Advance care planning. .Advance Care Planning     [facility]  I engaged the patient and healthcare power of   in a conversation about advance care planning and we specifically addressed what the goals of care would be moving forward and addressing patient current wishes per living will .  We did specifically address the patient's likely prognosis, which is fair .  We explored the patient's values and preferences for future care.  The patient and healthcare power of   endorses that what is most " important right now is to focus on contining treatment at this time.     Accordingly, we have decided that the best plan to meet the patient's goals includes continuing with treatment    I did not explain the role for hospice care at this stage of the patient's illness, including its ability to help the patient live with the best quality of life possible.  We will not be making a hospice referral.    I spent a total of 45 minutes engaging the patient in this advance care planning discussion.         Code Status  In light of the patients advanced and life limiting illness,I engaged the the patient and healthcare power of   in a conversation about the patient's preferences for care  at the very end of life. The patient wishes to have a natural, peaceful death.  Along those lines, the patient does not wish to have CPR or other invasive treatments performed when her heart and/or breathing stops. I communicated to the patient and healthcare power of   that a [place] form was completed to reflect other EOL preferences of the patient such as no life sustaning measures. .  I spent a total of 15 minutes engaging the patient in this advance care planning discussion.

## 2020-08-14 NOTE — SUBJECTIVE & OBJECTIVE
Interval History: The patient states that she is feeling well today. NO problems with dizziness  She states that she has been in the wheelchair but is trying to learn to walk again with a walker    Review of Systems   Constitutional: Negative for chills and fever.   Respiratory: Negative for cough, shortness of breath and wheezing.    Cardiovascular: Negative for chest pain, palpitations and leg swelling.   Gastrointestinal: Negative for abdominal pain, blood in stool, constipation, diarrhea, nausea and vomiting.   Genitourinary: Negative for dysuria and hematuria.   Musculoskeletal: Negative for back pain.   Skin: Negative for rash.   Neurological: Negative for dizziness, weakness, light-headedness and headaches.     Objective:     Vital Signs (Most Recent):  Temp: 98.8 °F (37.1 °C) (08/14/20 1649)  Pulse: 86 (08/14/20 1649)  Resp: 18 (08/14/20 1649)  BP: (!) 165/68 (08/14/20 1649)  SpO2: 98 % (08/14/20 1649) Vital Signs (24h Range):  Temp:  [96.5 °F (35.8 °C)-98.8 °F (37.1 °C)] 98.8 °F (37.1 °C)  Pulse:  [71-97] 86  Resp:  [9-18] 18  SpO2:  [78 %-100 %] 98 %  BP: ()/(40-84) 165/68     Weight: 60.3 kg (133 lb)  Body mass index is 28.78 kg/m².    Intake/Output Summary (Last 24 hours) at 8/14/2020 1856  Last data filed at 8/14/2020 1800  Gross per 24 hour   Intake 460 ml   Output 730 ml   Net -270 ml      Physical Exam  Vitals signs and nursing note reviewed.   Constitutional:       General: She is not in acute distress.     Appearance: She is well-developed.   Eyes:      Conjunctiva/sclera: Conjunctivae normal.   Neck:      Vascular: No JVD.   Cardiovascular:      Rate and Rhythm: Normal rate and regular rhythm.      Heart sounds: Normal heart sounds.   Pulmonary:      Effort: Pulmonary effort is normal.      Breath sounds: Normal breath sounds. No wheezing.   Abdominal:      General: Bowel sounds are normal.      Palpations: Abdomen is soft.      Tenderness: There is no abdominal tenderness.    Musculoskeletal:         General: No tenderness.   Skin:     General: Skin is warm and dry.      Capillary Refill: Capillary refill takes less than 2 seconds.   Neurological:      Mental Status: She is alert and oriented to person, place, and time.         Significant Labs:   BMP:   Recent Labs   Lab 08/14/20  0641   GLU 92      K 4.8      CO2 26   BUN 23   CREATININE 0.8   CALCIUM 9.5   MG 1.7     CBC:   Recent Labs   Lab 08/13/20 2338 08/14/20  0641   WBC 7.27 5.45   HGB 9.1* 10.5*   HCT 26.7* 31.6*    165     CMP:   Recent Labs   Lab 08/13/20 2338 08/14/20  0641   * 136   K 4.1 4.8    100   CO2 25 26   * 92   BUN 24* 23   CREATININE 0.8 0.8   CALCIUM 9.2 9.5   PROT 6.3 6.8   ALBUMIN 3.8 3.9   BILITOT 0.2 0.2   ALKPHOS 79 89   AST 14 16   ALT 15 14   ANIONGAP 8 10   EGFRNONAA >60 >60     Magnesium:   Recent Labs   Lab 08/14/20  0641   MG 1.7       Significant Imaging:  US Carotid Bilateral  Narrative: EXAMINATION:  US CAROTID BILATERAL    CLINICAL HISTORY:  syncope;    TECHNIQUE:  Grayscale and color Doppler ultrasound examination of the carotid and vertebral artery systems bilaterally.  Stenosis estimates are per the NASCET measurement criteria.    COMPARISON:  None.    FINDINGS:  Right:    Internal Carotid Artery (ICA):    Peak systolic velocity 114 cm/sec    End diastolic velocity 18 cm/sec    ICA/CCA peak systolic ratio: 1.4    ICA/CCA end diastolic ratio: 2.6    Plaque formation: Homogeneous    Vertebral artery: Antegrade flow and normal waveform.    Left:    Internal Carotid Artery (ICA):    Peak systolic velocity 84 cm/sec    End diastolic velocity 17 cm/sec    ICA/CCA peak systolic ratio: 1.0    ICA/CCA end diastolic ratio: 1.9    Plaque formation: Homogeneous    Vertebral artery: Retrograde flow with partial occlusion.  Impression: No evidence of a hemodynamically significant carotid bifurcation stenosis.    Reversed flow with partial occlusion within the left  vertebral artery suspicious for subclavian steal.  Further evaluation with CTA or MRA is recommended.    This report was flagged in Epic as abnormal.    Electronically signed by: Robert Dodd MD  Date:    08/14/2020  Time:    15:20  Echo Color Flow Doppler? Yes; Bubble Contrast? Yes  · There is no evidence of intracardiac shunting.  · Normal left ventricular systolic function. The estimated ejection   fraction is 55%.  · Normal LV diastolic function.  · The estimated PA systolic pressure is 28 mmHg.  · No wall motion abnormalities.  · Normal right ventricular systolic function.  · Normal central venous pressure (3 mmHg).  · Mild mitral regurgitation.     CT Head Without Contrast  Narrative: EXAMINATION:  CT HEAD WITHOUT CONTRAST    CLINICAL HISTORY:  syncope;    TECHNIQUE:  Low dose axial images were obtained through the head.  Coronal and sagittal reformations were also performed. Contrast was not administered.    COMPARISON:  Head CT performed 11/20/2019    FINDINGS:  No acute intracranial hemorrhage, mass effect, or midline shift.  No evidence of acute transcortical infarct by noncontrast CT.  Nonspecific white matter hypoattenuation is again noted.  Remote lacunar infarct versus prominent perivascular space suggested within the inferior right basal ganglia.  Generalized parenchymal volume loss.  Partially empty sella.    No aggressive osseous lesion is seen.  No displaced fracture.  Trace paranasal sinus mucosal thickening.  Mastoid air cells are clear.  Atherosclerotic vascular calcifications.  Status post bilateral cataract surgery.  Extracranial soft tissue structures are unremarkable.  Impression: 1. No acute intracranial findings.  2. No significant change relative to 11/20/2019 head CT.    Electronically signed by: Robert Guido  Date:    08/14/2020  Time:    06:26  CTA Chest Non-Coronary (PE Study)  Narrative: EXAMINATION:  CTA CHEST NON CORONARY    CLINICAL HISTORY:  PE suspected, high pretest  prob;    TECHNIQUE:  Low dose axial images, sagittal and coronal reformations were obtained from the thoracic inlet to the lung bases following the IV administration of 100 mL of Omnipaque 350.  Contrast timing was optimized to evaluate the pulmonary arteries.  MIP images were performed.    COMPARISON:  Chest CT 01/23/2018; CT chest, abdomen and pelvis 08/29/2018; CT abdomen and pelvis 10/27/2019    FINDINGS:  The visualized soft tissue and vascular structures at the base of the neck are within normal limits.  There is a right-sided chest port in place.  The thoracic aorta maintains normal caliber, contour and course with atherosclerotic plaquing.  The heart is not enlarged.  There is calcific atherosclerosis of the coronary vessels.  No significant pericardial fluid present.  There is no axillary, hilar or mediastinal adenopathy.  The esophagus maintains normal caliber, contour and course.    The trachea is midline and the proximal airways are patent.  There are clustered pulmonary nodules within the right upper and middle lobes and the lingula which appear relatively unchanged in overall distribution and extent compared to prior chest CT examinations from 2018.  There is diffuse mosaic attenuation the pulmonary parenchyma.  There is no focal consolidation, significant volume of pleural fluid or pneumothorax.    Allowing for artifact from dense contrast bolus in the SVC, the pulmonary arteries demonstrate no filling defects to suggest pulmonary thromboembolism.    The visualized structures of the upper abdomen demonstrate no acute abnormalities noting unchanged nodular left adrenal gland thickening and cholecystectomy.  The visualized osseous structures demonstrate degenerative changes well as remote compression fracture deformity of the T11 vertebral body.  Impression: 1. No evidence of pulmonary thromboembolism.  2. Clustered pulmonary nodules in the right upper and middle lobes and lingula, similar to prior  examinations.  Findings can be seen in the setting of underlying atypical infectious process/small airways disease.  Additional diffuse mosaic attenuation of the pulmonary parenchyma is noted which can also be seen with underlying small airways disease.  3. Coronary artery and aortic atherosclerosis, cholecystectomy, T11 vertebral body compression fracture and additional findings as above.    Electronically signed by: Soumya Smith MD  Date:    08/14/2020  Time:    03:59  X-Ray Chest AP Portable  Narrative: EXAMINATION:  XR CHEST AP PORTABLE    CLINICAL HISTORY:  Chest Pain;    TECHNIQUE:  Single frontal view of the chest was performed.    COMPARISON:  11/20/2019    FINDINGS:  Cardiac monitoring leads overlie the chest.  Stably positioned right-sided chest port in place with catheter tip projecting over the SVC.  The cardiomediastinal silhouette is stable.  There is atherosclerosis of the thoracic aorta.  The lungs are symmetrically expanded with slight coarse interstitial attenuation, unchanged.  There is no evidence of confluent airspace consolidation, significant volume of pleural fluid or pneumothorax.  Visualized osseous structures are intact with remote traumatic deformity of the right humeral head.  Impression: No acute intrathoracic abnormality identified on this single radiographic view of chest.    Electronically signed by: Soumya Smith MD  Date:    08/14/2020  Time:    02:00  US Lower Extremity Veins Bilateral  Narrative: EXAMINATION:  US LOWER EXTREMITY VEINS BILATERAL    CLINICAL HISTORY:  Lower extremity edema.    TECHNIQUE:  Duplex and color flow Doppler and dynamic compression was performed of the bilateral lower extremity veins was performed.    COMPARISON:  Left lower extremity ultrasound from 02/03/2020.    FINDINGS:  Right thigh veins: The common femoral, femoral, popliteal, upper greater saphenous, and deep femoral veins are patent and free of thrombus. The veins are normally compressible and  have normal phasic flow and augmentation response.    Right calf veins: The visualized calf veins are patent.    Left thigh veins: There is occlusive deep vein thrombosis of the left common femoral vein, and nonocclusive deep vein thrombosis of the proximal, mid, and distal femoral vein and popliteal vein.    Left calf veins: The visualized calf veins are patent.    Miscellaneous: None  Impression: 1. Redemonstration of left lower extremity deep vein thrombosis involving the left common femoral, femoral, and popliteal veins.  2. No evidence of deep venous thrombosis in the right lower extremity.  This report was flagged in Epic as abnormal.    Electronically signed by: Richard Romero  Date:    08/14/2020  Time:    01:57

## 2020-08-14 NOTE — ED PROVIDER NOTES
Encounter Date: 8/13/2020    SCRIBE #1 NOTE: I, Mariluz Rajput, am scribing for, and in the presence of,  Joaquin Patel MD. I have scribed the entire note.       History     Chief Complaint   Patient presents with    Loss of Consciousness     Patient presents to ED secondary to unwitnessed syncopal episode. EMS states staff at [facility] reported patient was unarousable. Per EMS, patient was awake and alert upon their arrival. [facility] confirmed patient took prescribed clonidine at 1830. Patient was hypotensive and received 250cc of fluids pta. CBG was 125 per EMS.      This is a 84 y.o. 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     The patient presents to the Emergency Department via EMS due to unwitnessed syncopal episode prior to arrival.   Patient was found unresponsive by staff at [facility], but awake and alert when EMS arrived.   Symptoms are associated with dizziness.   She denies any nausea or headache.   Pt also complains of mild abdominal pain and pain to back of her neck.    Symptoms are aggravated by nothing.   Symptoms are relieved by nothing.  Patient has no prior history of similar symptoms.     The history is provided by the patient. No  was used.     Review of patient's allergies indicates:   Allergen Reactions    Adhesive Itching and Blisters    Penicillins Anaphylaxis    Tramadol Hives    Avelox [moxifloxacin] Rash     Facial and arm itching and redness. Pt states throat closes when given.    Amoxil [amoxicillin]     Aspridrox [aspirin, buffered]     Codeine Other (See Comments)     Throat swelling    Keflex [cephalexin]      Tolerated cefepime and cefazolin    Norvasc [amlodipine]     Red dye Hives    Robitussin [guaifenesin]      Sulfa (sulfonamide antibiotics)     Tylenol [acetaminophen]      Has reaction to Tylenol with red dye and unable to take Extra Strength Tylenol/ CAN ONLY TOLERATE REG STRENGTH TYLENOL    Vicks vaporub [camphor-eucalyptus oil-menthol]      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    In the setting of septic shock and near death    Petit mal epilepsy 1952    Scoliosis of lumbar spine     Seizures     Unspecified hypothyroidism      Past Surgical History:   Procedure Laterality Date    APPENDECTOMY      BACK SURGERY  1988    vertebral fracture    BACK SURGERY  02/2013    lumbar L2-5    CATARACT EXTRACTION, BILATERAL Bilateral     CHOLECYSTECTOMY      open    EYE SURGERY Bilateral     cataract removal with lens implant    HYSTERECTOMY      PERCUTANEOUS TRANSLUMINAL ANGIOPLASTY (PTA) OF PERIPHERAL VESSEL N/A 2/3/2020    Procedure: PTA, PERIPHERAL VESSEL;  Surgeon: Timmy Simmons MD;  Location: [facility] CATH LAB/EP;  Service: Cardiology;  Laterality: N/A;    PORTACATH PLACEMENT Right 09/2016    RENAL ARTERY STENT Left 07/19/2017    SIGMOIDECTOMY  10/29/2019    SMALL INTESTINE SURGERY  08/23/2016    THROMBECTOMY N/A 2/3/2020    Procedure: THROMBECTOMY;  Surgeon: Timmy Simmons MD;  Location: [facility] CATH LAB/EP;  Service: Cardiology;  Laterality: N/A;    TONSILLECTOMY      VAGINAL HYSTERECTOMY W/ ANTERIOR AND POSTERIOR VAGINAL REPAIR       Family History   Problem Relation Age of Onset    Pancreatic cancer Mother     Hypertension Father     Heart attack Father      Social History     Tobacco Use    Smoking status: Never Smoker    Smokeless tobacco: Never Used   Substance Use Topics    Alcohol use: No    Drug use: No     Review of Systems   Constitutional: Negative for activity change.   Respiratory: Negative for cough and shortness of  breath.    Cardiovascular: Negative for chest pain and palpitations.   Gastrointestinal: Positive for abdominal pain. Negative for nausea and vomiting.   Musculoskeletal: Positive for neck pain.   Skin: Negative for wound.   Neurological: Positive for dizziness and syncope. Negative for headaches.   Psychiatric/Behavioral: Negative for confusion.   All other systems reviewed and are negative.      Physical Exam     Initial Vitals [08/13/20 2227]   BP Pulse Resp Temp SpO2   (!) 112/40 78 18 98.1 °F (36.7 °C) 100 %      MAP       --         Physical Exam    Nursing note and vitals reviewed.  Constitutional: She appears well-developed and well-nourished. She is not diaphoretic. No distress.   HENT:   Head: Normocephalic and atraumatic.   Mouth/Throat: Oropharynx is clear and moist.   Eyes: Conjunctivae are normal. Pupils are equal, round, and reactive to light. Left eye exhibits nystagmus.   Neck: Normal range of motion. Neck supple.   Cardiovascular: Normal rate, regular rhythm, normal heart sounds and intact distal pulses. Exam reveals no gallop and no friction rub.    No murmur heard.  Pulmonary/Chest: Breath sounds normal. No respiratory distress. She has no wheezes. She has no rhonchi. She has no rales. She exhibits tenderness (Reproducible left chest wall tenderness).   Abdominal: Soft. Bowel sounds are normal. She exhibits no distension. There is no abdominal tenderness.   Ostomy in left abdomen with light brown stool. Sight is clean. Non distended and non tender.    Musculoskeletal: Normal range of motion. Edema (bilateral 2-3+ lower extremity edema. Left greater than right. ) present. No tenderness.   Lymphadenopathy:     She has no cervical adenopathy.   Neurological: She is alert and oriented to person, place, and time. She has normal strength.   Head impulse test positive.   Skin: Skin is warm and dry. Capillary refill takes less than 2 seconds.   Psychiatric: She has a normal mood and affect. Thought  content normal.         ED Course   Procedures  Labs Reviewed   CBC W/ AUTO DIFFERENTIAL   COMPREHENSIVE METABOLIC PANEL   TROPONIN I   B-TYPE NATRIURETIC PEPTIDE          Imaging Results    None          Medical Decision Making:   Initial Assessment:   Emergent evaluation of an 84-year-old female was found down, unwitnessed syncope, unconscious with hypotension s/p 250 cc fluid bolus.  Patient had reportedly taken her clonidine recently prior to the event.  Patient blood pressure normal here.  No arrhythmia noted on EKG.   Differentials include but are not limited to blood pressure medicine affect, vasovagal, orthostasis, aortic stenosis, arrhythmia, PE.  Workup included basic labs, troponin, EKG, cardiac monitoring, ultrasound DVT study, PE protocol CTA.  Patient will likely be admitted.  Clinical Tests:   Lab Tests: Ordered and Reviewed  Radiological Study: Ordered and Reviewed  Medical Tests: Ordered and Reviewed                   ED Course as of Aug 13 2327   Thu Aug 13, 2020   2310 EKG:  Normal sinus rhythm at 77 bpm, nl axis, nl intervals, no hypertrophy, no ST-T changes as read by me (Dr. Patel).  Impression:  Normal    [NP]      ED Course User Index  [NP] Joaquin Patel MD          Patient turned over to Dr. Ghosh at 1:00 a.m. pending CTA chest for PE, ultrasound DVT study and disposition, likely obs vs admission for syncope.      Clinical Impression:       ICD-10-CM ICD-9-CM   1. Syncope, unspecified syncope type  R55 780.2   2. Chest pain  R07.9 786.50   3. Bilateral leg edema  R60.0 782.3   4. Syncope  R55 780.2                   I, Dr. Joaquin Patel, personally performed the services described in this documentation. All medical record entries made by the scribe were at my direction and in my presence. I have reviewed the chart and agree that the record is accurate and complete.   Joaquin Patel MD.                 Joaquin Patel MD  08/14/20 1013       Joaquin Patel MD  08/14/20 1014

## 2020-08-14 NOTE — PLAN OF CARE
Chief Complaint   Patient presents with    Loss of Consciousness       Patient presents to ED secondary to unwitnessed syncopal episode. EMS states staff at [facility] reported patient was unarousable. Per EMS, patient was awake and alert upon their arrival. [facility] confirmed patient took prescribed clonidine at 1830. Patient was hypotensive and received 250cc of fluids pta. CBG was 125 per EMS.      Pt is prison resident at [facility]. Pt has  & Hospital Bed. Pt WC Bound, partially dependent, needs assistance with ADLs. Pt has LLQ Colostomy bag. Pt has contracted plan with [facility] (has insufficient postural support and can not ride in  for transport). (Per Prior admission note: There may be a $300 contractural cost of not medically necessary for pt to travel by ambulance)    Pt's daughter, Beatris Richey is listed as primary care giver [phone], cell [phone]    PCP- Dr. Jose Torres  Pain Mgmt- Dr. Chirag Bates  Neurologist- Dr. Gamal Lock  Cardiologist- Dr. Timmy Simmons      08/14/20 1132   Discharge Assessment   Assessment Type Discharge Planning Assessment   Confirmed/corrected address and phone number on facesheet? No   Assessment information obtained from? Medical Record   Expected Length of Stay (days) 3   Communicated expected length of stay with patient/caregiver no   Prior to hospitilization cognitive status: Alert/Oriented   Prior to hospitalization functional status: Partially Dependent;Needs Assistance   Current cognitive status: Alert/Oriented   Current Functional Status: Partially Dependent;Needs Assistance   Facility Arrived From: [facility]   Lives With facility resident  ([facility])   Able to Return to Prior Arrangements yes   Is patient able to care for self after discharge? No   Who are your caregiver(s) and their phone number(s)? Resident at [facility], Daughter: Beatris Richey [phone]   Patient's perception of discharge disposition  [facility] care facility   Readmission Within the Last 30 Days no previous admission in last 30 days   Patient currently being followed by outpatient case management? No   Patient currently receives any other outside agency services? No   Equipment Currently Used at Home other (see comments);wheelchair;hospital bed   Do you have any problems affording any of your prescribed medications? No   Is the patient taking medications as prescribed? yes   Does the patient have transportation home? Yes   Transportation Anticipated   ([facility] transportation)   Dialysis Name and Scheduled days N/A   Does the patient receive services at the [facility]? No   Discharge Plan A Return to nursing home   Discharge Plan B Skilled Nursing Facility   DME Needed Upon Discharge  none   Patient/Family in Agreement with Plan unable to assess       Gabrielle Josue RN Transitional Navigator  [phone]

## 2020-08-14 NOTE — MEDICAL/APP STUDENT
Advance Care Planning     Living Will  During this visit, I engaged the patient, family and healthcare power of    in the advance care planning process.  The patient and I reviewed the role for advance directives and their purpose in directing future healthcare if the patient's unable to speak for him/herself.  At this point in time, the patient does have full decision-making capacity.  We discussed different extreme health states that she could experience, and reviewed what kind of medical care she would want in those situations.  The patient communicated that if she were comatose and had little chance of a meaningful recovery, she would not want machines/life-sustaining treatments used. In addition to the above preference, other important end-of-life issues for the patient include ventilators. The patient has completed a living will to reflect these preferences and The patient has already designated a healthcare power of  to make decisions on [unfilled] behalf.  I spent a total of 15 minutes engaging the patient in this advance care planning discussion.          GOC  I engaged the patient, family and healthcare power of   in a conversation about advance care planning and we specifically addressed what the goals of care would be moving forward, in light of the patient's change in clinical status, specifically that in case a medical emergency arises, we should refer to the living will for medical decision making. At present, the pt did not wish to discuss her GOC and preferred to defer these decisions to a later date to have that discussion with her daughter/ETIENNE Spear.  We did not specifically address the patient's likely prognosis, which is fair .  We explored the patient's values and preferences for future care.  The patient, family and healthcare power of   endorses that what is most important right now is to focus on extending life as long as possible, even it it means  sacrificing quality    Accordingly, we have decided that the best plan to meet the patient's goals includes continuing with treatment    I did not explain the role for hospice care at this stage of the patient's illness, including its ability to help the patient live with the best quality of life possible.  We will not be making a hospice referral.    I spent a total of 20 minutes engaging the patient in this advance care planning discussion.         Code Status  In light of the patients advanced and life limiting illness,I engaged the the patient, family and healthcare power of   in a conversation about the patient's preferences for care  at the very end of life. The patient wishes to have a natural, peaceful death.  Along those lines, the patient does not wish to have CPR or other invasive treatments performed when her heart and/or breathing stops. I communicated to the patient, family and healthcare power of   that a DNR order would be placed in her medical record to reflect this preference.  I spent a total of 20 minutes engaging the patient in this advance care planning discussion.

## 2020-08-14 NOTE — ASSESSMENT & PLAN NOTE
Received fluid bolus in ED  SBP 90s-130s  Continuous Cardiac monitoring  Orthostatic blood pressure pending  Neuro checks Q4hrs  2D Echo with bubble pending  Carotid US pending  CT of head pending  Hold Antihypertensives  Monitor vitals

## 2020-08-14 NOTE — ASSESSMENT & PLAN NOTE
Ostomy in left abdomen with light brown stool. Sight is clean. Non distended and non tender.    Continue miralax  Ostomy Care

## 2020-08-14 NOTE — SUBJECTIVE & OBJECTIVE
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    In the setting of septic shock and near death    Petit mal epilepsy 1954    Scoliosis of lumbar spine     Seizures     Unspecified hypothyroidism        Past Surgical History:   Procedure Laterality Date    APPENDECTOMY      BACK SURGERY  1988    vertebral fracture    BACK SURGERY  02/2013    lumbar L2-5    CATARACT EXTRACTION, BILATERAL Bilateral     CHOLECYSTECTOMY      open    EYE SURGERY Bilateral     cataract removal with lens implant    HYSTERECTOMY      PERCUTANEOUS TRANSLUMINAL ANGIOPLASTY (PTA) OF PERIPHERAL VESSEL N/A 2/3/2020    Procedure: PTA, PERIPHERAL VESSEL;  Surgeon: Timmy Simmons MD;  Location: [facility] CATH LAB/EP;  Service: Cardiology;  Laterality: N/A;    PORTACATH PLACEMENT Right 09/2016    RENAL ARTERY STENT Left 07/19/2017    SIGMOIDECTOMY  10/29/2019    SMALL INTESTINE SURGERY  08/23/2016    THROMBECTOMY N/A 2/3/2020    Procedure: THROMBECTOMY;  Surgeon: Timmy Simmons MD;  Location: [facility] CATH LAB/EP;  Service: Cardiology;  Laterality: N/A;    TONSILLECTOMY      VAGINAL HYSTERECTOMY W/ ANTERIOR AND POSTERIOR VAGINAL REPAIR         Review of patient's allergies indicates:   Allergen Reactions    Adhesive Itching and Blisters    Penicillins Anaphylaxis    Tramadol Hives    Avelox [moxifloxacin] Rash     Facial and arm itching and redness. Pt states throat closes when given.    Amoxil [amoxicillin]     Aspridrox [aspirin, buffered]     Codeine Other (See Comments)     Throat swelling    Keflex [cephalexin]      Tolerated cefepime and cefazolin    Norvasc [amlodipine]     Red dye Hives    Robitussin [guaifenesin]     Sulfa (sulfonamide antibiotics)     Tylenol [acetaminophen]      Has reaction to Tylenol with red dye and  unable to take Extra Strength Tylenol/ CAN ONLY TOLERATE REG STRENGTH TYLENOL    Vicks vaporub [camphor-eucalyptus oil-menthol]        No current facility-administered medications on file prior to encounter.      Current Outpatient Medications on File Prior to Encounter   Medication Sig    acetaminophen (TYLENOL) 325 MG tablet Take 2 tablets (650 mg total) by mouth every 4 (four) hours as needed for Pain.    apixaban (ELIQUIS) 5 mg Tab Take 2 tablets (10 mg total) by mouth 2 (two) times daily for 7 days, THEN 1 tablet (5 mg total) 2 (two) times daily.    ascorbic acid, vitamin C, (VITAMIN C) 500 MG tablet Take 500 mg by mouth once daily.    calcium carbonate (OS-RICHARDSON) 600 mg calcium (1,500 mg) Tab Take 0.5 tablets (300 mg total) by mouth once daily.    cloNIDine (CATAPRES) 0.2 MG tablet Take 1 tablet (0.2 mg total) by mouth once daily. Hold for systolic blood pressure less than 110    levothyroxine (SYNTHROID) 125 MCG tablet TAKE 1 TABLET (125 MCG TOTAL) BY MOUTH ONCE DAILY.    lisinopril (PRINIVIL,ZESTRIL) 40 MG tablet TAKE 1 TABLET BY MOUTH EVERY DAY    magnesium oxide (MAG-OX) 400 mg (241.3 mg magnesium) tablet Take 1 tablet (400 mg total) by mouth 2 (two) times daily.    mirtazapine (REMERON) 7.5 MG Tab Take 1 tablet (7.5 mg total) by mouth every evening.    multivitamin (MULTIPLE VITAMINS DAILY ORAL) Take by mouth.    nutritional supplements Liqd Take 30 mLs by mouth once daily.    ondansetron (ZOFRAN) 4 MG tablet Take 1 tablet (4 mg total) by mouth every 8 (eight) hours as needed for Nausea.    OXcarbazepine (TRILEPTAL) 300 MG Tab Take 1 tablet (300 mg total) by mouth nightly.    PHENobarbitaL (LUMINAL) 97.2 MG tablet Take 1 tablet (97.2 mg total) by mouth every evening.    polyethylene glycol (GLYCOLAX) 17 gram PwPk Take 17 g by mouth 2 (two) times daily as needed (Constipation).    pravastatin (PRAVACHOL) 20 MG tablet Take 1 tablet (20 mg total) by mouth once daily.    vitamin D (VITAMIN D3)  1000 units Tab Take 1,000 Units by mouth once daily.     Family History     Problem Relation (Age of Onset)    Heart attack Father    Hypertension Father    Pancreatic cancer Mother        Tobacco Use    Smoking status: Never Smoker    Smokeless tobacco: Never Used   Substance and Sexual Activity    Alcohol use: No    Drug use: No    Sexual activity: Not on file     Review of Systems   Constitutional: Negative for chills and fever.   HENT: Negative for congestion, postnasal drip and rhinorrhea.    Eyes: Negative for visual disturbance.   Respiratory: Negative for chest tightness and shortness of breath.    Cardiovascular: Negative for chest pain and palpitations.   Gastrointestinal: Positive for abdominal pain (mild). Negative for blood in stool, constipation, nausea and vomiting.   Genitourinary: Negative for difficulty urinating.   Musculoskeletal: Negative for arthralgias and myalgias.   Skin: Negative for color change and wound.   Neurological: Negative for weakness, light-headedness and headaches.   Hematological: Does not bruise/bleed easily.   Psychiatric/Behavioral: Negative for agitation.     Objective:     Vital Signs (Most Recent):  Temp: 97.6 °F (36.4 °C) (08/14/20 0554)  Pulse: 74 (08/14/20 0555)  Resp: 13 (08/14/20 0555)  BP: 127/82 (08/14/20 0555)  SpO2: 99 % (08/14/20 0555) Vital Signs (24h Range):  Temp:  [97.6 °F (36.4 °C)-98.1 °F (36.7 °C)] 97.6 °F (36.4 °C)  Pulse:  [71-83] 74  Resp:  [9-18] 13  SpO2:  [78 %-100 %] 99 %  BP: ()/(40-82) 127/82     Weight: 59 kg (130 lb)  Body mass index is 28.13 kg/m².    Physical Exam  Constitutional:       Appearance: Normal appearance.   HENT:      Head: Normocephalic and atraumatic.      Nose: Nose normal. No congestion or rhinorrhea.      Mouth/Throat:      Mouth: Mucous membranes are moist.   Eyes:      Extraocular Movements: Extraocular movements intact.      Pupils: Pupils are equal, round, and reactive to light.   Neck:      Musculoskeletal:  Normal range of motion and neck supple.   Cardiovascular:      Rate and Rhythm: Normal rate.      Heart sounds: No murmur.   Pulmonary:      Effort: Pulmonary effort is normal. No respiratory distress.      Breath sounds: Normal breath sounds.   Abdominal:      General: Bowel sounds are normal.      Palpations: Abdomen is soft.      Comments: Ostomy in left abdomen with light brown stool. Sight is clean. Non distended and non tender.     Musculoskeletal:         General: No deformity.      Right lower leg: Edema present.      Left lower leg: Edema present.   Skin:     General: Skin is warm and dry.   Neurological:      General: No focal deficit present.      Mental Status: She is alert.   Psychiatric:         Mood and Affect: Mood normal.           CRANIAL NERVES     CN III, IV, VI   Pupils are equal, round, and reactive to light.       Significant Labs:     Bilirubin:   Recent Labs   Lab 08/13/20 2338   BILITOT 0.2     BMP:   Recent Labs   Lab 08/13/20 2338   *   *   K 4.1      CO2 25   BUN 24*   CREATININE 0.8   CALCIUM 9.2     CBC:   Recent Labs   Lab 08/13/20 2338   WBC 7.27   HGB 9.1*   HCT 26.7*        CMP:   Recent Labs   Lab 08/13/20 2338   *   K 4.1      CO2 25   *   BUN 24*   CREATININE 0.8   CALCIUM 9.2   PROT 6.3   ALBUMIN 3.8   BILITOT 0.2   ALKPHOS 79   AST 14   ALT 15   ANIONGAP 8   EGFRNONAA >60     Cardiac Markers:   Recent Labs   Lab 08/13/20 2338   BNP 28     Troponin:   Recent Labs   Lab 08/13/20 2338 08/14/20  0152   TROPONINI <0.006 <0.006       Significant Imaging: I have reviewed all pertinent imaging results/findings within the past 24 hours.

## 2020-08-14 NOTE — H&P
[facility] Hospital Medicine  History & Physical    Patient Name: Annette Garcia  MRN: [id number]  Admission Date: 8/13/2020  Attending Physician: Bethany Maciel MD   Primary Care Provider: Jose Valles MD         Patient information was obtained from patient, past medical records and ER records.     Subjective:     Principal Problem:Syncope    Chief Complaint:   Chief Complaint   Patient presents with    Loss of Consciousness     Patient presents to ED secondary to unwitnessed syncopal episode. EMS states staff at [facility] reported patient was unarousable. Per EMS, patient was awake and alert upon their arrival. [facility] confirmed patient took prescribed clonidine at 1830. Patient was hypotensive and received 250cc of fluids pta. CBG was 125 per EMS.         HPI: 84 y.o. female with past medical history of peripheral vascular disease, renovascular hypertension, renal artery stenosis status post left renal artery stent placement on 7/19/17, coronary artery disease with history of myocardial infarction, paroxysmal atrial fibrillation, diffuse large B cell lymphoma, anemia, epilepsy, hypothyroidism, chronic hyponatremia, depression, lumbar and sacroiliac joint osteoarthritis with chronic low back pain and history of lumbar spine surgery in 2013, slow transit constipation, history of appendectomy, history of cholecystectomy, history hysterectomy, history of small bowel obstruction status post small bowel resection on 8/23/16, history of sigmoid colectomy with end colostomy on 10/29/19. She lives at [facility] in [place], [place]. She is wheelchair bound. Her primary care physician is Dr. Jose Torres. Her pain management specialist is Dr. Chirag Bates. Her neurologist is Dr. Gamal Lock. Her cardiologist is Dr. Timmy Simmons    She presents to the Emergency Department via EMS due to unwitnessed syncopal episode prior to arrival. Patient  was found unresponsive by staff at [facility], but awake and alert when EMS arrived. Associated symptom of dizziness.  She denies any nausea, vomiting, headache, very, chills, cough, congestion, chest pain, palpitations, change in bladder habits or change in bowel habits.  She complains of mild abdominal pain and pain to back of her neck. Denies aggravating or alleviating factors.  Denies history of similar symptoms.  ED findings: H/H 9.1/26.7, Na 133, covid negative, US of bilateral lower extremities showed chronic left common femoral, femoral, and popliteal veins, CTA of chest showed no PE, chest x-ray with no acute findings, Ct of head pending, patient admitted to hospital medicine for further medical management.    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    In the setting of septic shock and near death    Petit mal epilepsy 1954    Scoliosis of lumbar spine     Seizures     Unspecified hypothyroidism        Past Surgical History:   Procedure Laterality Date    APPENDECTOMY      BACK SURGERY  1988    vertebral fracture    BACK SURGERY  02/2013    lumbar L2-5    CATARACT EXTRACTION, BILATERAL Bilateral     CHOLECYSTECTOMY      open    EYE SURGERY Bilateral     cataract removal with lens implant    HYSTERECTOMY      PERCUTANEOUS TRANSLUMINAL ANGIOPLASTY (PTA) OF PERIPHERAL VESSEL N/A 2/3/2020    Procedure: PTA, PERIPHERAL VESSEL;  Surgeon: Timmy Simmons MD;  Location: [facility] CATH LAB/EP;  Service: Cardiology;  Laterality: N/A;    PORTACATH PLACEMENT Right 09/2016    RENAL ARTERY STENT Left 07/19/2017    SIGMOIDECTOMY  10/29/2019    SMALL INTESTINE SURGERY  08/23/2016    THROMBECTOMY N/A 2/3/2020    Procedure: THROMBECTOMY;  Surgeon: Timmy Simmons MD;  Location: [facility] CATH LAB/EP;  Service:  Cardiology;  Laterality: N/A;    TONSILLECTOMY      VAGINAL HYSTERECTOMY W/ ANTERIOR AND POSTERIOR VAGINAL REPAIR         Review of patient's allergies indicates:   Allergen Reactions    Adhesive Itching and Blisters    Penicillins Anaphylaxis    Tramadol Hives    Avelox [moxifloxacin] Rash     Facial and arm itching and redness. Pt states throat closes when given.    Amoxil [amoxicillin]     Aspridrox [aspirin, buffered]     Codeine Other (See Comments)     Throat swelling    Keflex [cephalexin]      Tolerated cefepime and cefazolin    Norvasc [amlodipine]     Red dye Hives    Robitussin [guaifenesin]     Sulfa (sulfonamide antibiotics)     Tylenol [acetaminophen]      Has reaction to Tylenol with red dye and unable to take Extra Strength Tylenol/ CAN ONLY TOLERATE REG STRENGTH TYLENOL    Vicks vaporub [camphor-eucalyptus oil-menthol]        No current facility-administered medications on file prior to encounter.      Current Outpatient Medications on File Prior to Encounter   Medication Sig    acetaminophen (TYLENOL) 325 MG tablet Take 2 tablets (650 mg total) by mouth every 4 (four) hours as needed for Pain.    apixaban (ELIQUIS) 5 mg Tab Take 2 tablets (10 mg total) by mouth 2 (two) times daily for 7 days, THEN 1 tablet (5 mg total) 2 (two) times daily.    ascorbic acid, vitamin C, (VITAMIN C) 500 MG tablet Take 500 mg by mouth once daily.    calcium carbonate (OS-RICHARDSON) 600 mg calcium (1,500 mg) Tab Take 0.5 tablets (300 mg total) by mouth once daily.    cloNIDine (CATAPRES) 0.2 MG tablet Take 1 tablet (0.2 mg total) by mouth once daily. Hold for systolic blood pressure less than 110    levothyroxine (SYNTHROID) 125 MCG tablet TAKE 1 TABLET (125 MCG TOTAL) BY MOUTH ONCE DAILY.    lisinopril (PRINIVIL,ZESTRIL) 40 MG tablet TAKE 1 TABLET BY MOUTH EVERY DAY    magnesium oxide (MAG-OX) 400 mg (241.3 mg magnesium) tablet Take 1 tablet (400 mg total) by mouth 2 (two) times daily.    mirtazapine  (REMERON) 7.5 MG Tab Take 1 tablet (7.5 mg total) by mouth every evening.    multivitamin (MULTIPLE VITAMINS DAILY ORAL) Take by mouth.    nutritional supplements Liqd Take 30 mLs by mouth once daily.    ondansetron (ZOFRAN) 4 MG tablet Take 1 tablet (4 mg total) by mouth every 8 (eight) hours as needed for Nausea.    OXcarbazepine (TRILEPTAL) 300 MG Tab Take 1 tablet (300 mg total) by mouth nightly.    PHENobarbitaL (LUMINAL) 97.2 MG tablet Take 1 tablet (97.2 mg total) by mouth every evening.    polyethylene glycol (GLYCOLAX) 17 gram PwPk Take 17 g by mouth 2 (two) times daily as needed (Constipation).    pravastatin (PRAVACHOL) 20 MG tablet Take 1 tablet (20 mg total) by mouth once daily.    vitamin D (VITAMIN D3) 1000 units Tab Take 1,000 Units by mouth once daily.     Family History     Problem Relation (Age of Onset)    Heart attack Father    Hypertension Father    Pancreatic cancer Mother        Tobacco Use    Smoking status: Never Smoker    Smokeless tobacco: Never Used   Substance and Sexual Activity    Alcohol use: No    Drug use: No    Sexual activity: Not on file     Review of Systems   Constitutional: Negative for chills and fever.   HENT: Negative for congestion, postnasal drip and rhinorrhea.    Eyes: Negative for visual disturbance.   Respiratory: Negative for chest tightness and shortness of breath.    Cardiovascular: Negative for chest pain and palpitations.   Gastrointestinal: Positive for abdominal pain (mild). Negative for blood in stool, constipation, nausea and vomiting.   Genitourinary: Negative for difficulty urinating.   Musculoskeletal: Negative for arthralgias and myalgias.   Skin: Negative for color change and wound.   Neurological: Negative for weakness, light-headedness and headaches.   Hematological: Does not bruise/bleed easily.   Psychiatric/Behavioral: Negative for agitation.     Objective:     Vital Signs (Most Recent):  Temp: 97.6 °F (36.4 °C) (08/14/20 0554)  Pulse:  74 (08/14/20 0555)  Resp: 13 (08/14/20 0555)  BP: 127/82 (08/14/20 0555)  SpO2: 99 % (08/14/20 0555) Vital Signs (24h Range):  Temp:  [97.6 °F (36.4 °C)-98.1 °F (36.7 °C)] 97.6 °F (36.4 °C)  Pulse:  [71-83] 74  Resp:  [9-18] 13  SpO2:  [78 %-100 %] 99 %  BP: ()/(40-82) 127/82     Weight: 59 kg (130 lb)  Body mass index is 28.13 kg/m².    Physical Exam  Constitutional:       Appearance: Normal appearance.   HENT:      Head: Normocephalic and atraumatic.      Nose: Nose normal. No congestion or rhinorrhea.      Mouth/Throat:      Mouth: Mucous membranes are moist.   Eyes:      Extraocular Movements: Extraocular movements intact.      Pupils: Pupils are equal, round, and reactive to light.   Neck:      Musculoskeletal: Normal range of motion and neck supple.   Cardiovascular:      Rate and Rhythm: Normal rate.      Heart sounds: No murmur.   Pulmonary:      Effort: Pulmonary effort is normal. No respiratory distress.      Breath sounds: Normal breath sounds.   Abdominal:      General: Bowel sounds are normal.      Palpations: Abdomen is soft.      Comments: Ostomy in left abdomen with light brown stool. Sight is clean. Non distended and non tender.     Musculoskeletal:         General: No deformity.      Right lower leg: Edema present.      Left lower leg: Edema present.   Skin:     General: Skin is warm and dry.   Neurological:      General: No focal deficit present.      Mental Status: She is alert.   Psychiatric:         Mood and Affect: Mood normal.           CRANIAL NERVES     CN III, IV, VI   Pupils are equal, round, and reactive to light.       Significant Labs:     Bilirubin:   Recent Labs   Lab 08/13/20 2338   BILITOT 0.2     BMP:   Recent Labs   Lab 08/13/20 2338   *   *   K 4.1      CO2 25   BUN 24*   CREATININE 0.8   CALCIUM 9.2     CBC:   Recent Labs   Lab 08/13/20 2338   WBC 7.27   HGB 9.1*   HCT 26.7*        CMP:   Recent Labs   Lab 08/13/20 2338   *   K 4.1   CL  100   CO2 25   *   BUN 24*   CREATININE 0.8   CALCIUM 9.2   PROT 6.3   ALBUMIN 3.8   BILITOT 0.2   ALKPHOS 79   AST 14   ALT 15   ANIONGAP 8   EGFRNONAA >60     Cardiac Markers:   Recent Labs   Lab 08/13/20  2338   BNP 28     Troponin:   Recent Labs   Lab 08/13/20  2338 08/14/20  0152   TROPONINI <0.006 <0.006       Significant Imaging: I have reviewed all pertinent imaging results/findings within the past 24 hours.    Assessment/Plan:     * Syncope  Received fluid bolus in ED  SBP 90s-130s  Continuous Cardiac monitoring  Orthostatic blood pressure pending  Neuro checks Q4hrs  2D Echo with bubble pending  Carotid US pending  CT of head pending  Hold Antihypertensives  Monitor vitals            Chronic anemia  Monitor      Nursing home resident  Plan to return to [facility] when medically stable.      Paroxysmal atrial fibrillation  DVT of deep femoral vein, left    Continue Eliquis      Urinary retention  Strict I&O  Monitor      Abdominal pain  Present on admission, currently resolved  monitor      Slow transit constipation  Ostomy in left abdomen with light brown stool. Sight is clean. Non distended and non tender.    Continue miralax  Ostomy Care      Old MI (myocardial infarction)  Coronary Artery Disease    Continue Statin    Hyponatremia  Monitor      Acquired hypothyroidism  Continue Levothyroxine       Epilepsy  Seizure precautions  Continue trileptal and luminal         VTE Risk Mitigation (From admission, onward)         Ordered     apixaban tablet 5 mg  2 times daily      08/14/20 0525     Place sequential compression device  Until discontinued      08/14/20 0516                   Abbey Mayo NP  Department of Hospital Medicine   [facility]

## 2020-08-14 NOTE — ED NOTES
Multiple attempts to gain PIV access above the wrist unsuccessful by multiple RNs. Dr. Ghosh will place w ultrasound.

## 2020-08-14 NOTE — PROGRESS NOTES
TN contacted [facility], Kimberly. TN sent Facility Transfer Orders via [facility]. Per Lizzette the med list was not at all what they had the pt on prior to admit. The patient's medications were not reconciled in the ER.  Lizzette faxed the correct med list to TN. TN emailed them to Dr Maciel. TN awaiting meds to be corrected in the system as well as the Facility Transfer Orders. Orders will have to be hard faxed to [facility] floor nurse, [phone] in the AM. Dr Peterson received the email and will correct the meds in the system. Pt will d/c first thing in the AM

## 2020-08-14 NOTE — HPI
84 y.o. female with past medical history of peripheral vascular disease, renovascular hypertension, renal artery stenosis status post left renal artery stent placement on 7/19/17, coronary artery disease with history of myocardial infarction, paroxysmal atrial fibrillation, diffuse large B cell lymphoma, anemia, epilepsy, hypothyroidism, chronic hyponatremia, depression, lumbar and sacroiliac joint osteoarthritis with chronic low back pain and history of lumbar spine surgery in 2013, slow transit constipation, history of appendectomy, history of cholecystectomy, history hysterectomy, history of small bowel obstruction status post small bowel resection on 8/23/16, history of sigmoid colectomy with end colostomy on 10/29/19. She lives at [facility] in [place], [place]. She is wheelchair bound. Her primary care physician is Dr. Jose Torres. Her pain management specialist is Dr. Chirag Bates. Her neurologist is Dr. Gamal Lock. Her cardiologist is Dr. Timmy Simmons    She presents to the Emergency Department via EMS due to unwitnessed syncopal episode prior to arrival. Patient was found unresponsive by staff at [facility], but awake and alert when EMS arrived. Associated symptom of dizziness.  She denies any nausea, vomiting, headache, very, chills, cough, congestion, chest pain, palpitations, change in bladder habits or change in bowel habits.  She complains of mild abdominal pain and pain to back of her neck. Denies aggravating or alleviating factors.  Denies history of similar symptoms.  ED findings: H/H 9.1/26.7, Na 133, covid negative, US of bilateral lower extremities showed chronic left common femoral, femoral, and popliteal veins, CTA of chest showed no PE, chest x-ray with no acute findings, Ct of head pending, patient admitted to hospital medicine for further medical management.

## 2020-08-14 NOTE — PROVIDER PROGRESS NOTES - EMERGENCY DEPT.
Encounter Date: 8/13/2020    ED Physician Progress Notes        Physician Note:   Received sign-out from Dr. Patel.  Pending CT scan.  CT scan negative.  Ultrasound confirmed chronic bilateral DVTs.  Given patient's age, history of hypotension and hematocrit <30, high risk for adverse event secondary to syncope.  Will plan admission to hospital medicine.  Patient understands agrees with plan.

## 2020-08-14 NOTE — CONSULTS
[facility]  Palliative Medicine  Consult Note    Patient Name: Annette Garcia  MRN: [id number]  Admission Date: 8/13/2020  Hospital Length of Stay: 0 days  Code Status: DNR   Attending Provider: Bethany Maciel MD  Consulting Provider: Stephanie Leyva NP  Primary Care Physician: Jose Valles MD  Principal Problem:Syncope    Patient information was obtained from patient, relative(s), past medical records and ER records.      Inpatient consult to Palliative Care  Consult performed by: Stephanie Leyva NP  Consult ordered by: Alice Barker MD        Assessment/Plan:     Palliative care encounter  Living will on file  DNR  LAPOST  Continue treatment.         Thank you for your consult. I will follow-up with patient. Please contact us if you have any additional questions.    Subjective:     HPI:   84 y.o. female with past medical history of peripheral vascular disease, renovascular hypertension, renal artery stenosis status post left renal artery stent placement on 7/19/17, coronary artery disease with history of myocardial infarction, paroxysmal atrial fibrillation, diffuse large B cell lymphoma, anemia, epilepsy, hypothyroidism, chronic hyponatremia, depression, lumbar and sacroiliac joint osteoarthritis with chronic low back pain and history of lumbar spine surgery in 2013, slow transit constipation, history of appendectomy, history of cholecystectomy, history hysterectomy, history of small bowel obstruction status post small bowel resection on 8/23/16, history of sigmoid colectomy with end colostomy on 10/29/19. She lives at [facility] in [place], [place]. She is wheelchair bound. Her primary care physician is Dr. Jose Torres. Her pain management specialist is Dr. Chirag Bates. Her neurologist is Dr. Gamal Lock. Her cardiologist is Dr. Timmy Simmons.    Palliative medicine met with patient today. [facility] discussion. Patient in NAD.  "States, "My living will has my wishes." Living will states in second paragraph no life sustaining treatment." Spoke with daughter Beatris who POA. Discussed patient current illness. Daughter stated, "My mom had this before a while back. I hope they can find what is going on with her. I know she does not want to be on a machine to live." Advance care planning. .Advance Care Planning     [facility]  I engaged the patient and healthcare power of   in a conversation about advance care planning and we specifically addressed what the goals of care would be moving forward and addressing patient current wishes per living will .  We did specifically address the patient's likely prognosis, which is fair .  We explored the patient's values and preferences for future care.  The patient and healthcare power of   endorses that what is most important right now is to focus on contining treatment at this time.     Accordingly, we have decided that the best plan to meet the patient's goals includes continuing with treatment    I did not explain the role for hospice care at this stage of the patient's illness, including its ability to help the patient live with the best quality of life possible.  We will not be making a hospice referral.    I spent a total of 45 minutes engaging the patient in this advance care planning discussion.         Code Status  In light of the patients advanced and life limiting illness,I engaged the the patient and healthcare power of   in a conversation about the patient's preferences for care  at the very end of life. The patient wishes to have a natural, peaceful death.  Along those lines, the patient does not wish to have CPR or other invasive treatments performed when her heart and/or breathing stops. I communicated to the patient and healthcare power of   that a [place]POST form was completed to reflect other EOL preferences of the patient such as no life sustaning measures. .  I " spent a total of 15 minutes engaging the patient in this advance care planning discussion.          Hospital Course:  No notes on file        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    In the setting of septic shock and near death    Petit mal epilepsy 1954    Scoliosis of lumbar spine     Seizures     Unspecified hypothyroidism        Past Surgical History:   Procedure Laterality Date    APPENDECTOMY      BACK SURGERY  1988    vertebral fracture    BACK SURGERY  02/2013    lumbar L2-5    CATARACT EXTRACTION, BILATERAL Bilateral     CHOLECYSTECTOMY      open    EYE SURGERY Bilateral     cataract removal with lens implant    HYSTERECTOMY      PERCUTANEOUS TRANSLUMINAL ANGIOPLASTY (PTA) OF PERIPHERAL VESSEL N/A 2/3/2020    Procedure: PTA, PERIPHERAL VESSEL;  Surgeon: Timmy Simmons MD;  Location: [facility] CATH LAB/EP;  Service: Cardiology;  Laterality: N/A;    PORTACATH PLACEMENT Right 09/2016    RENAL ARTERY STENT Left 07/19/2017    SIGMOIDECTOMY  10/29/2019    SMALL INTESTINE SURGERY  08/23/2016    THROMBECTOMY N/A 2/3/2020    Procedure: THROMBECTOMY;  Surgeon: Timmy Simmons MD;  Location: [facility] CATH LAB/EP;  Service: Cardiology;  Laterality: N/A;    TONSILLECTOMY      VAGINAL HYSTERECTOMY W/ ANTERIOR AND POSTERIOR VAGINAL REPAIR         Review of patient's allergies indicates:   Allergen Reactions    Adhesive Itching and Blisters    Penicillins Anaphylaxis    Tramadol Hives    Avelox [moxifloxacin] Rash     Facial and arm itching and redness. Pt states throat closes when given.    Amoxil [amoxicillin]     Aspridrox [aspirin, buffered]     Codeine Other (See Comments)     Throat swelling    Keflex [cephalexin]      Tolerated cefepime and cefazolin    Norvasc [amlodipine]     Red dye Hives     Robitussin [guaifenesin]     Sulfa (sulfonamide antibiotics)     Tylenol [acetaminophen]      Has reaction to Tylenol with red dye and unable to take Extra Strength Tylenol/ CAN ONLY TOLERATE REG STRENGTH TYLENOL    Vicks vaporub [camphor-eucalyptus oil-menthol]        Medications:  Continuous Infusions:  Scheduled Meds:   apixaban  5 mg Oral BID    levothyroxine  125 mcg Oral Before breakfast    OXcarbazepine  300 mg Oral Nightly    PHENobarbitaL  97.2 mg Oral QHS    pravastatin  20 mg Oral Daily     PRN Meds:melatonin, ondansetron, polyethylene glycol    Family History     Problem Relation (Age of Onset)    Heart attack Father    Hypertension Father    Pancreatic cancer Mother        Tobacco Use    Smoking status: Never Smoker    Smokeless tobacco: Never Used   Substance and Sexual Activity    Alcohol use: No    Drug use: No    Sexual activity: Not on file       Review of Systems   Constitutional: Negative for chills and fever.   HENT: Negative for congestion, postnasal drip and rhinorrhea.    Eyes: Negative for visual disturbance.   Respiratory: Negative for chest tightness and shortness of breath.    Cardiovascular: Negative for chest pain and palpitations.   Gastrointestinal: Positive for abdominal pain (mild). Negative for blood in stool, constipation, nausea and vomiting.   Genitourinary: Negative for difficulty urinating.   Musculoskeletal: Negative for arthralgias and myalgias.   Skin: Negative for color change and wound.   Neurological: Negative for weakness, light-headedness and headaches.   Hematological: Does not bruise/bleed easily.   Psychiatric/Behavioral: Negative for agitation.     Objective:     Vital Signs (Most Recent):  Temp: 96.5 °F (35.8 °C) (08/14/20 1146)  Pulse: 71 (08/14/20 1202)  Resp: 17 (08/14/20 1146)  BP: (!) 145/84 (08/14/20 1202)  SpO2: (unable to obtain reading at this time) (08/14/20 0855) Vital Signs (24h Range):  Temp:  [96.5 °F (35.8 °C)-98.1 °F (36.7 °C)] 96.5 °F  (35.8 °C)  Pulse:  [71-97] 71  Resp:  [9-18] 17  SpO2:  [78 %-100 %] 100 %  BP: ()/(40-84) 145/84     Weight: 60.3 kg (133 lb)  Body mass index is 28.78 kg/m².    Physical Exam  Constitutional:       Appearance: Normal appearance.   HENT:      Head: Normocephalic and atraumatic.      Nose: Nose normal. No congestion or rhinorrhea.      Mouth/Throat:      Mouth: Mucous membranes are moist.   Eyes:      Extraocular Movements: Extraocular movements intact.      Pupils: Pupils are equal, round, and reactive to light.   Neck:      Musculoskeletal: Normal range of motion and neck supple.   Cardiovascular:      Rate and Rhythm: Normal rate.      Heart sounds: No murmur.   Pulmonary:      Effort: Pulmonary effort is normal. No respiratory distress.      Breath sounds: Normal breath sounds.   Abdominal:      General: Bowel sounds are normal.      Palpations: Abdomen is soft.      Comments: Ostomy in left abdomen with light brown stool. Sight is clean. Non distended and non tender.     Musculoskeletal:         General: No deformity.      Right lower leg: Edema present.      Left lower leg: Edema present.   Skin:     General: Skin is warm and dry.   Neurological:      General: No focal deficit present.      Mental Status: She is alert.   Psychiatric:         Mood and Affect: Mood normal.         Advance Care Planning   Review of Symptoms              ECOG Performance Status Grade:  2 - Ambulates, capable of self care only    Advanced Directives:  Living Will:  Yes.  Copy on chart:  Yes    LaPOST:  Yes    Do Not Resuscitate Status:  Yes    Medical Power of : Yes     Agent's Name:  Beatris.     Decision Making:  Patient answered questions and Family answered questions    Living Arrangements:  Lives in nursing home    Psychosocial/Cultural:  none    Spiritual:  F - Aby and Belief:  None  I - Importance:  None  C - Community:  None  A - Address in Care:  None      Significant Labs: All pertinent labs within the past  24 hours have been reviewed.  CBC:   Recent Labs   Lab 08/14/20  0641   WBC 5.45   HGB 10.5*   HCT 31.6*   *        BMP:  Recent Labs   Lab 08/14/20  0641   GLU 92      K 4.8      CO2 26   BUN 23   CREATININE 0.8   CALCIUM 9.5   MG 1.7     LFT:  Lab Results   Component Value Date    AST 16 08/14/2020    ALKPHOS 89 08/14/2020    BILITOT 0.2 08/14/2020     Albumin:   Albumin   Date Value Ref Range Status   08/14/2020 3.9 3.5 - 5.2 g/dL Final     Protein:   Total Protein   Date Value Ref Range Status   08/14/2020 6.8 6.0 - 8.4 g/dL Final     Lactic acid:   Lab Results   Component Value Date    LACTATE 2.6 (H) 10/30/2019    LACTATE 3.6 (HH) 10/29/2019       Significant Imaging: I have reviewed all pertinent imaging results/findings within the past 24 hours.  Recommendations  Continue treatment  DNR    Stephanie Leyva, MSN, APRN, NP-C  Palliative medicine-[facility].      > 50% of 45 min visit spent in chart review, face to face discussion of goals of care,  symptom assessment, coordination of care and emotional support.    30 min Advance care planning.

## 2020-08-14 NOTE — ED NOTES
Adult Physical Assessment  LOC: Annette Garcia, 84 y.o. female verified via two identifiers.  The patient is awake, alert, oriented and speaking appropriately at this time.  APPEARANCE: Patient resting comfortably and appears to be in no acute distress at this time. Patient is clean and well groomed, patient's clothing is properly fastened.  SKIN:The skin is warm and dry, color somewhat pale, patient has normal skin turgor and moist mucus membranes, skin intact, no breakdown or brusing noted.  MUSCULOSKELETAL: Patient moving all extremities well, no obvious swelling or deformities noted.  RESPIRATORY: Airway is open and patent, respirations are spontaneous, patient has a normal effort and rate, no accessory muscle use noted.  CARDIAC: Patient has a normal rate and rhythm, no periphreal edema noted in any extremity, capillary refill < 3 seconds in all extremities  ABDOMEN: Soft and non tender to palpation, no abdominal distention noted. Bowel sounds present in all four quadrants.  NEUROLOGIC: Eyes open spontaneously, behavior appropriate to situation, follows commands, facial expression symmetrical, bilateral hand grasp equal and even, purposeful motor response noted, normal sensation in all extremities when touched with a finger.

## 2020-08-14 NOTE — SUBJECTIVE & OBJECTIVE
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    In the setting of septic shock and near death    Petit mal epilepsy 1954    Scoliosis of lumbar spine     Seizures     Unspecified hypothyroidism        Past Surgical History:   Procedure Laterality Date    APPENDECTOMY      BACK SURGERY  1988    vertebral fracture    BACK SURGERY  02/2013    lumbar L2-5    CATARACT EXTRACTION, BILATERAL Bilateral     CHOLECYSTECTOMY      open    EYE SURGERY Bilateral     cataract removal with lens implant    HYSTERECTOMY      PERCUTANEOUS TRANSLUMINAL ANGIOPLASTY (PTA) OF PERIPHERAL VESSEL N/A 2/3/2020    Procedure: PTA, PERIPHERAL VESSEL;  Surgeon: Timmy Simmons MD;  Location: [facility] CATH LAB/EP;  Service: Cardiology;  Laterality: N/A;    PORTACATH PLACEMENT Right 09/2016    RENAL ARTERY STENT Left 07/19/2017    SIGMOIDECTOMY  10/29/2019    SMALL INTESTINE SURGERY  08/23/2016    THROMBECTOMY N/A 2/3/2020    Procedure: THROMBECTOMY;  Surgeon: Timmy Simmons MD;  Location: [facility] CATH LAB/EP;  Service: Cardiology;  Laterality: N/A;    TONSILLECTOMY      VAGINAL HYSTERECTOMY W/ ANTERIOR AND POSTERIOR VAGINAL REPAIR         Review of patient's allergies indicates:   Allergen Reactions    Adhesive Itching and Blisters    Penicillins Anaphylaxis    Tramadol Hives    Avelox [moxifloxacin] Rash     Facial and arm itching and redness. Pt states throat closes when given.    Amoxil [amoxicillin]     Aspridrox [aspirin, buffered]     Codeine Other (See Comments)     Throat swelling    Keflex [cephalexin]      Tolerated cefepime and cefazolin    Norvasc [amlodipine]     Red dye Hives    Robitussin [guaifenesin]     Sulfa (sulfonamide antibiotics)     Tylenol [acetaminophen]      Has reaction to Tylenol with red dye and  unable to take Extra Strength Tylenol/ CAN ONLY TOLERATE REG STRENGTH TYLENOL    Vicks vaporub [camphor-eucalyptus oil-menthol]        Medications:  Continuous Infusions:  Scheduled Meds:   apixaban  5 mg Oral BID    levothyroxine  125 mcg Oral Before breakfast    OXcarbazepine  300 mg Oral Nightly    PHENobarbitaL  97.2 mg Oral QHS    pravastatin  20 mg Oral Daily     PRN Meds:melatonin, ondansetron, polyethylene glycol    Family History     Problem Relation (Age of Onset)    Heart attack Father    Hypertension Father    Pancreatic cancer Mother        Tobacco Use    Smoking status: Never Smoker    Smokeless tobacco: Never Used   Substance and Sexual Activity    Alcohol use: No    Drug use: No    Sexual activity: Not on file       Review of Systems   Constitutional: Negative for chills and fever.   HENT: Negative for congestion, postnasal drip and rhinorrhea.    Eyes: Negative for visual disturbance.   Respiratory: Negative for chest tightness and shortness of breath.    Cardiovascular: Negative for chest pain and palpitations.   Gastrointestinal: Positive for abdominal pain (mild). Negative for blood in stool, constipation, nausea and vomiting.   Genitourinary: Negative for difficulty urinating.   Musculoskeletal: Negative for arthralgias and myalgias.   Skin: Negative for color change and wound.   Neurological: Negative for weakness, light-headedness and headaches.   Hematological: Does not bruise/bleed easily.   Psychiatric/Behavioral: Negative for agitation.     Objective:     Vital Signs (Most Recent):  Temp: 96.5 °F (35.8 °C) (08/14/20 1146)  Pulse: 71 (08/14/20 1202)  Resp: 17 (08/14/20 1146)  BP: (!) 145/84 (08/14/20 1202)  SpO2: (unable to obtain reading at this time) (08/14/20 0855) Vital Signs (24h Range):  Temp:  [96.5 °F (35.8 °C)-98.1 °F (36.7 °C)] 96.5 °F (35.8 °C)  Pulse:  [71-97] 71  Resp:  [9-18] 17  SpO2:  [78 %-100 %] 100 %  BP: ()/(40-84) 145/84     Weight: 60.3 kg (133  lb)  Body mass index is 28.78 kg/m².    Physical Exam  Constitutional:       Appearance: Normal appearance.   HENT:      Head: Normocephalic and atraumatic.      Nose: Nose normal. No congestion or rhinorrhea.      Mouth/Throat:      Mouth: Mucous membranes are moist.   Eyes:      Extraocular Movements: Extraocular movements intact.      Pupils: Pupils are equal, round, and reactive to light.   Neck:      Musculoskeletal: Normal range of motion and neck supple.   Cardiovascular:      Rate and Rhythm: Normal rate.      Heart sounds: No murmur.   Pulmonary:      Effort: Pulmonary effort is normal. No respiratory distress.      Breath sounds: Normal breath sounds.   Abdominal:      General: Bowel sounds are normal.      Palpations: Abdomen is soft.      Comments: Ostomy in left abdomen with light brown stool. Sight is clean. Non distended and non tender.     Musculoskeletal:         General: No deformity.      Right lower leg: Edema present.      Left lower leg: Edema present.   Skin:     General: Skin is warm and dry.   Neurological:      General: No focal deficit present.      Mental Status: She is alert.   Psychiatric:         Mood and Affect: Mood normal.         Advance Care Planning   Review of Symptoms              ECOG Performance Status Grade:  2 - Ambulates, capable of self care only    Advanced Directives:  Living Will:  Yes.  Copy on chart:  Yes    LaPOST:  Yes    Do Not Resuscitate Status:  Yes    Medical Power of : Yes     Agent's Name:  Beatris.     Decision Making:  Patient answered questions and Family answered questions    Living Arrangements:  Lives in nursing home    Psychosocial/Cultural:  none    Spiritual:  F - Aby and Belief:  None  I - Importance:  None  C - Community:  None  A - Address in Care:  None    Recommendations  Continue treatment  DNR    Stephanie Leyva, MSN, APRN, NP-C  Palliative medicine-[facility].    Significant Labs: All pertinent labs within the past 24 hours have  been reviewed.  CBC:   Recent Labs   Lab 08/14/20  0641   WBC 5.45   HGB 10.5*   HCT 31.6*   *        BMP:  Recent Labs   Lab 08/14/20  0641   GLU 92      K 4.8      CO2 26   BUN 23   CREATININE 0.8   CALCIUM 9.5   MG 1.7     LFT:  Lab Results   Component Value Date    AST 16 08/14/2020    ALKPHOS 89 08/14/2020    BILITOT 0.2 08/14/2020     Albumin:   Albumin   Date Value Ref Range Status   08/14/2020 3.9 3.5 - 5.2 g/dL Final     Protein:   Total Protein   Date Value Ref Range Status   08/14/2020 6.8 6.0 - 8.4 g/dL Final     Lactic acid:   Lab Results   Component Value Date    LACTATE 2.6 (H) 10/30/2019    LACTATE 3.6 (HH) 10/29/2019       Significant Imaging: I have reviewed all pertinent imaging results/findings within the past 24 hours.

## 2020-08-14 NOTE — MEDICAL/APP STUDENT
"Consult Note  Palliative Care      Consult Requested By: Bethany Maciel MD  Reason for Consult: Goals of care discussion    SUBJECTIVE:     History of Present Illness:  Ms. Garcia is an 85 yo female with multiple co-comorbidities who presented with syncope. She took clonidine yesterday evening and was hypotensive at the nursing home. Pt does not remember what happened. Besides swelling in her lower extremities, pt has no other complaints.    Palliative Care Discussion:  Pt lives at [facility] in [place], [place]. She has been living at the NH since her hospitalization in 11/2019. She was living in her home in [place] prior to the NH. She has not been able to see her children since March due to Covid. Pt is wheelchair bound; but is occasionally able to walk with a rolling walker. She has 7 children; and her  passed away in 2019.     In terms of GOC discussion, pt does not wish to discuss her code status at present moment. She states that she had a "rough night in the ED" and was "not ready" to have that discussion right now. Pt wants us to refer to her living will for GOC and she plans to discuss her GOC with her daughter/MPOA Beatris Garcia at a later date. Her living will (signed 1/2015) states she does not wish to have any life sustaining medical procedures done in the event of a medical emergency. Spoke with daughter Beatris who is aware of the pt's living will wishes and ok with DNR and translating those wishes on to [facility].     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    In the setting of septic shock and near death    Petit mal epilepsy 1954    Scoliosis of lumbar spine     Seizures     Unspecified hypothyroidism      Past Surgical History:   Procedure Laterality Date    APPENDECTOMY      " BACK SURGERY  1988    vertebral fracture    BACK SURGERY  02/2013    lumbar L2-5    CATARACT EXTRACTION, BILATERAL Bilateral     CHOLECYSTECTOMY      open    EYE SURGERY Bilateral     cataract removal with lens implant    HYSTERECTOMY      PERCUTANEOUS TRANSLUMINAL ANGIOPLASTY (PTA) OF PERIPHERAL VESSEL N/A 2/3/2020    Procedure: PTA, PERIPHERAL VESSEL;  Surgeon: Timmy Simmons MD;  Location: [facility] CATH LAB/EP;  Service: Cardiology;  Laterality: N/A;    PORTACATH PLACEMENT Right 09/2016    RENAL ARTERY STENT Left 07/19/2017    SIGMOIDECTOMY  10/29/2019    SMALL INTESTINE SURGERY  08/23/2016    THROMBECTOMY N/A 2/3/2020    Procedure: THROMBECTOMY;  Surgeon: Timmy Simmons MD;  Location: [facility] CATH LAB/EP;  Service: Cardiology;  Laterality: N/A;    TONSILLECTOMY      VAGINAL HYSTERECTOMY W/ ANTERIOR AND POSTERIOR VAGINAL REPAIR       Family History   Problem Relation Age of Onset    Pancreatic cancer Mother     Hypertension Father     Heart attack Father      Social History     Tobacco Use    Smoking status: Never Smoker    Smokeless tobacco: Never Used   Substance Use Topics    Alcohol use: No    Drug use: No       Mental Status: Oriented x3, Engaged    ECOG Performance Status Grade: 3 - Confined to bed or chair 50% of waking hours    Review of Systems   Constitutional: Negative for chills, fever and weight loss.   HENT: Negative.    Eyes: Negative.    Respiratory: Negative for cough, hemoptysis, sputum production and shortness of breath.    Cardiovascular: Positive for leg swelling. Negative for chest pain, palpitations and orthopnea.   Gastrointestinal: Negative for abdominal pain, constipation, diarrhea, heartburn, nausea and vomiting.   Genitourinary: Negative for dysuria and urgency.   Musculoskeletal: Negative.    Skin: Negative.    Neurological: Negative for dizziness and headaches.   Endo/Heme/Allergies: Negative.    Psychiatric/Behavioral: Negative.        OBJECTIVE:     Pain  Assessment: No pain reported at this time    Decision-Making Capacity: Patient answered questions, Family answered questions    Advanced Directives:  Living Will: Yes. Copy on chart: Yes  Do Not Resuscitate Status: Yes  Medical Power of : Yes. Agent's Name: Beatris Garcia.     Living Arrangements: Lives in nursing home    Psychosocial, Spiritual, Cultural:  n/a    ASSESSMENT/PLAN:   Ms. Garcia is an 85 yo female who was consulted for GOC discussion. At present, she does not wish to have a GOC discussion. However, she is willing to revisit this discussion at a later date in conjunction with her siva/ETIENNE Spear.     Recommendations:  Medical: continue treatment  Legal: DNR; will discuss GOC and details of [facility] form at later date  Prognosis: fair    Thank you for your consult, palliative care will continue to follow.    Time Spent: 60    Damián Oquendo, MS4

## 2020-08-14 NOTE — PLAN OF CARE
[facility]     Department of Hospital Medicine     [address] [place] [address]     [phone] after hours  [phone] fax       NURSING HOME ORDERS    08/15/2020    Admit to Nursing Home:  [facility] Regular [facility]                                                  Diagnoses:  Active Hospital Problems    Diagnosis  POA    *Syncope [R55]  Yes    Hypotension [I95.9]  Yes    Coronary artery disease [I25.10]  Yes    Chronic anemia [D64.9]  Yes    DVT of deep femoral vein, left [I82.412]  Yes    Nursing home resident [Z59.3]  Not Applicable     Chronic [facility] ([address], [place] [address])       Paroxysmal atrial fibrillation [I48.0]  Yes     Chronic    Palliative care encounter [Z51.5]  Not Applicable    Goals of care, counseling/discussion [Z71.89]  Not Applicable    Counseling regarding advance care planning and goals of care [Z71.89]  Not Applicable    Urinary retention [R33.9]  Yes     Chronic     Martinez removed 11/4/19, had 500 cc on bladder scan after several hours      Abdominal pain [R10.9]  Yes    Slow transit constipation [K59.01]  Yes     Chronic    Old MI (myocardial infarction) [I25.2]  Not Applicable     Chronic    Hyponatremia [E87.1]  Yes    Epilepsy [G40.909]  Yes     Chronic     Epilepsy type unknown      Acquired hypothyroidism [E03.9]  Yes     Chronic      Resolved Hospital Problems   No resolved problems to display.       Patient is homebound due to:  Syncope    Allergies:  Review of patient's allergies indicates:   Allergen Reactions    Adhesive Itching and Blisters    Penicillins Anaphylaxis    Tramadol Hives    Avelox [moxifloxacin] Rash     Facial and arm itching and redness. Pt states throat closes when given.    Amoxil [amoxicillin]     Aspridrox [aspirin, buffered]     Codeine Other (See Comments)     Throat swelling    Keflex [cephalexin]      Tolerated cefepime and cefazolin     Norvasc [amlodipine]     Red dye Hives    Robitussin [guaifenesin]     Sulfa (sulfonamide antibiotics)     Tylenol [acetaminophen]      Has reaction to Tylenol with red dye and unable to take Extra Strength Tylenol/ CAN ONLY TOLERATE REG STRENGTH TYLENOL    Vicks vaporub [camphor-eucalyptus oil-menthol]        Vitals:       Every shift (Skilled Nursing patients)    Diet: CARDIAC   Supplement:  1 can every three times a day with meals                         Type:  House             Acitivities:     - Up in a chair each morning as tolerated   - Ambulate with assistance to bathroom   - Scheduled walks once each shift (every 8 hours)  *     LABS:  Per facility protocol     CMP, CBC each month for 3 months   PT-INR each week for 1 month then monthly   Pre-albumin each month for 3 months   TSH every year   Tegretol level in 1 month and every 3 months      Nursing Precautions:      - Fall precautions per nursing home protocol   - Seizure precaution per nursing home protocol            Medications: Discontinue all previous medication orders, if any. See new list below.     Annette Garcia   Home Medication Instructions STEFANO:[id number]    Printed on:08/14/20 2692     Medication Information                                   acetaminophen (TYLENOL) 325 MG tablet  Take 2 tablets (650 mg total) by mouth every 4 (four) hours as needed.             apixaban (ELIQUIS) 2.5 mg Tab  Take 1 tablet (2.5 mg total) by mouth 2 (two) times daily.             ascorbic acid, vitamin C, (VITAMIN C) 500 MG tablet  Take 500 mg by mouth once daily.             calcium carbonate (OS-RICHARDSON) 600 mg calcium (1,500 mg) Tab  Take 0.5 tablets (300 mg total) by mouth once daily.             cloNIDine (CATAPRES) 0.1 MG tablet  Take 1 tablet (0.1 mg total) by mouth 2 (two) times daily.  NEW DOSE           levothyroxine (SYNTHROID) 125 MCG tablet  TAKE 1 TABLET (125 MCG TOTAL) BY MOUTH ONCE DAILY.             magnesium oxide (MAG-OX) 400 mg (241.3  mg magnesium) tablet  Take 2 tablets (800 mg total) by mouth once daily.             mirtazapine (REMERON) 7.5 MG Tab  Take 1 tablet (7.5 mg total) by mouth every evening.             multivitamin (MULTIPLE VITAMINS DAILY ORAL)  Take by mouth.             nutritional supplements Liqd  Take 30 mLs by mouth once daily.                          ondansetron (ZOFRAN-ODT) 4 MG TbDL  Take 1 tablet (4 mg total) by mouth every 8 (eight) hours as needed.             OXcarbazepine (TRILEPTAL) 300 MG Tab  Take 1 tablet (300 mg total) by mouth nightly.             PHENobarbitaL (LUMINAL) 97.2 MG tablet  Take 1 tablet (97.2 mg total) by mouth every evening.             polyethylene glycol (GLYCOLAX) 17 gram PwPk  Take 17 g by mouth 2 (two) times daily as needed (Constipation).             pravastatin (PRAVACHOL) 20 MG tablet  Take 1 tablet (20 mg total) by mouth once daily.             vitamin D (VITAMIN D3) 1000 units Tab  Take 1,000 Units by mouth once daily.                     _________________________________  Bethany Maciel MD  08/14/2020

## 2020-08-15 VITALS
OXYGEN SATURATION: 97 % | WEIGHT: 133 LBS | RESPIRATION RATE: 17 BRPM | DIASTOLIC BLOOD PRESSURE: 60 MMHG | HEIGHT: 57 IN | HEART RATE: 76 BPM | TEMPERATURE: 98 F | BODY MASS INDEX: 28.69 KG/M2 | SYSTOLIC BLOOD PRESSURE: 131 MMHG

## 2020-08-15 LAB
BASOPHILS # BLD AUTO: 0.02 K/UL (ref 0–0.2)
BASOPHILS NFR BLD: 0.5 % (ref 0–1.9)
DIFFERENTIAL METHOD: ABNORMAL
EOSINOPHIL # BLD AUTO: 0.2 K/UL (ref 0–0.5)
EOSINOPHIL NFR BLD: 5.1 % (ref 0–8)
ERYTHROCYTE [DISTWIDTH] IN BLOOD BY AUTOMATED COUNT: 12.7 % (ref 11.5–14.5)
HCT VFR BLD AUTO: 28.8 % (ref 37–48.5)
HGB BLD-MCNC: 9.4 G/DL (ref 12–16)
IMM GRANULOCYTES # BLD AUTO: 0.01 K/UL (ref 0–0.04)
IMM GRANULOCYTES NFR BLD AUTO: 0.2 % (ref 0–0.5)
LYMPHOCYTES # BLD AUTO: 1.4 K/UL (ref 1–4.8)
LYMPHOCYTES NFR BLD: 34.7 % (ref 18–48)
MAGNESIUM SERPL-MCNC: 1.5 MG/DL (ref 1.6–2.6)
MCH RBC QN AUTO: 33.8 PG (ref 27–31)
MCHC RBC AUTO-ENTMCNC: 32.6 G/DL (ref 32–36)
MCV RBC AUTO: 104 FL (ref 82–98)
MONOCYTES # BLD AUTO: 0.5 K/UL (ref 0.3–1)
MONOCYTES NFR BLD: 11 % (ref 4–15)
NEUTROPHILS # BLD AUTO: 2 K/UL (ref 1.8–7.7)
NEUTROPHILS NFR BLD: 48.5 % (ref 38–73)
NRBC BLD-RTO: 0 /100 WBC
PHOSPHATE SERPL-MCNC: 3.5 MG/DL (ref 2.7–4.5)
PLATELET # BLD AUTO: 153 K/UL (ref 150–350)
PMV BLD AUTO: 9.9 FL (ref 9.2–12.9)
POCT GLUCOSE: 88 MG/DL (ref 70–110)
RBC # BLD AUTO: 2.78 M/UL (ref 4–5.4)
WBC # BLD AUTO: 4.09 K/UL (ref 3.9–12.7)

## 2020-08-15 PROCEDURE — 63600175 PHARM REV CODE 636 W HCPCS: Performed by: INTERNAL MEDICINE

## 2020-08-15 PROCEDURE — 84100 ASSAY OF PHOSPHORUS: CPT

## 2020-08-15 PROCEDURE — 25000003 PHARM REV CODE 250: Performed by: NURSE PRACTITIONER

## 2020-08-15 PROCEDURE — G0378 HOSPITAL OBSERVATION PER HR: HCPCS

## 2020-08-15 PROCEDURE — 94761 N-INVAS EAR/PLS OXIMETRY MLT: CPT

## 2020-08-15 PROCEDURE — 36415 COLL VENOUS BLD VENIPUNCTURE: CPT

## 2020-08-15 PROCEDURE — 83735 ASSAY OF MAGNESIUM: CPT

## 2020-08-15 PROCEDURE — 25000003 PHARM REV CODE 250: Performed by: INTERNAL MEDICINE

## 2020-08-15 PROCEDURE — 85025 COMPLETE CBC W/AUTO DIFF WBC: CPT

## 2020-08-15 RX ORDER — MAGNESIUM SULFATE HEPTAHYDRATE 40 MG/ML
2 INJECTION, SOLUTION INTRAVENOUS ONCE
Status: COMPLETED | OUTPATIENT
Start: 2020-08-15 | End: 2020-08-15

## 2020-08-15 RX ADMIN — CLONIDINE HYDROCHLORIDE 0.1 MG: 0.1 TABLET ORAL at 10:08

## 2020-08-15 RX ADMIN — VITAMIN D, TAB 1000IU (100/BT) 1000 UNITS: 25 TAB at 10:08

## 2020-08-15 RX ADMIN — APIXABAN 2.5 MG: 2.5 TABLET, FILM COATED ORAL at 10:08

## 2020-08-15 RX ADMIN — MAGNESIUM SULFATE IN WATER 2 G: 40 INJECTION, SOLUTION INTRAVENOUS at 10:08

## 2020-08-15 RX ADMIN — PRAVASTATIN SODIUM 20 MG: 10 TABLET ORAL at 10:08

## 2020-08-15 RX ADMIN — LEVOTHYROXINE SODIUM 125 MCG: 75 TABLET ORAL at 05:08

## 2020-08-15 RX ADMIN — MAGNESIUM OXIDE 400 MG (241.3 MG MAGNESIUM) TABLET 800 MG: TABLET at 10:08

## 2020-08-15 NOTE — PROGRESS NOTES
[facility] Hospital Medicine  Progress Note    Patient Name: Annette Garcia  MRN: [id number]  Patient Class: OP- Observation   Admission Date: 8/13/2020  Length of Stay: 0 days  Attending Physician: Bethany Maciel MD  Primary Care Provider: Jose Valles MD        Subjective:     Principal Problem:Syncope        HPI:  84 y.o. female with past medical history of peripheral vascular disease, renovascular hypertension, renal artery stenosis status post left renal artery stent placement on 7/19/17, coronary artery disease with history of myocardial infarction, paroxysmal atrial fibrillation, diffuse large B cell lymphoma, anemia, epilepsy, hypothyroidism, chronic hyponatremia, depression, lumbar and sacroiliac joint osteoarthritis with chronic low back pain and history of lumbar spine surgery in 2013, slow transit constipation, history of appendectomy, history of cholecystectomy, history hysterectomy, history of small bowel obstruction status post small bowel resection on 8/23/16, history of sigmoid colectomy with end colostomy on 10/29/19. She lives at [facility] in [place], [place]. She is wheelchair bound. Her primary care physician is Dr. Jose Torres. Her pain management specialist is Dr. Chirag Bates. Her neurologist is Dr. Gamal Lock. Her cardiologist is Dr. Timmy Simmons    She presents to the Emergency Department via EMS due to unwitnessed syncopal episode prior to arrival. Patient was found unresponsive by staff at [facility], but awake and alert when EMS arrived. Associated symptom of dizziness.  She denies any nausea, vomiting, headache, very, chills, cough, congestion, chest pain, palpitations, change in bladder habits or change in bowel habits.  She complains of mild abdominal pain and pain to back of her neck. Denies aggravating or alleviating factors.  Denies history of similar symptoms.  ED findings: H/H 9.1/26.7, Na 133,  covid negative, US of bilateral lower extremities showed chronic left common femoral, femoral, and popliteal veins, CTA of chest showed no PE, chest x-ray with no acute findings, Ct of head pending, patient admitted to hospital medicine for further medical management.    Overview/Hospital Course:  No notes on file    Interval History: The patient states that she is feeling well today. NO problems with dizziness  She states that she has been in the wheelchair but is trying to learn to walk again with a walker    Review of Systems   Constitutional: Negative for chills and fever.   Respiratory: Negative for cough, shortness of breath and wheezing.    Cardiovascular: Negative for chest pain, palpitations and leg swelling.   Gastrointestinal: Negative for abdominal pain, blood in stool, constipation, diarrhea, nausea and vomiting.   Genitourinary: Negative for dysuria and hematuria.   Musculoskeletal: Negative for back pain.   Skin: Negative for rash.   Neurological: Negative for dizziness, weakness, light-headedness and headaches.     Objective:     Vital Signs (Most Recent):  Temp: 98.8 °F (37.1 °C) (08/14/20 1649)  Pulse: 86 (08/14/20 1649)  Resp: 18 (08/14/20 1649)  BP: (!) 165/68 (08/14/20 1649)  SpO2: 98 % (08/14/20 1649) Vital Signs (24h Range):  Temp:  [96.5 °F (35.8 °C)-98.8 °F (37.1 °C)] 98.8 °F (37.1 °C)  Pulse:  [71-97] 86  Resp:  [9-18] 18  SpO2:  [78 %-100 %] 98 %  BP: ()/(40-84) 165/68     Weight: 60.3 kg (133 lb)  Body mass index is 28.78 kg/m².    Intake/Output Summary (Last 24 hours) at 8/14/2020 1856  Last data filed at 8/14/2020 1800  Gross per 24 hour   Intake 460 ml   Output 730 ml   Net -270 ml      Physical Exam  Vitals signs and nursing note reviewed.   Constitutional:       General: She is not in acute distress.     Appearance: She is well-developed.   Eyes:      Conjunctiva/sclera: Conjunctivae normal.   Neck:      Vascular: No JVD.   Cardiovascular:      Rate and Rhythm: Normal rate and  regular rhythm.      Heart sounds: Normal heart sounds.   Pulmonary:      Effort: Pulmonary effort is normal.      Breath sounds: Normal breath sounds. No wheezing.   Abdominal:      General: Bowel sounds are normal.      Palpations: Abdomen is soft.      Tenderness: There is no abdominal tenderness.   Musculoskeletal:         General: No tenderness.   Skin:     General: Skin is warm and dry.      Capillary Refill: Capillary refill takes less than 2 seconds.   Neurological:      Mental Status: She is alert and oriented to person, place, and time.         Significant Labs:   BMP:   Recent Labs   Lab 08/14/20  0641   GLU 92      K 4.8      CO2 26   BUN 23   CREATININE 0.8   CALCIUM 9.5   MG 1.7     CBC:   Recent Labs   Lab 08/13/20 2338 08/14/20  0641   WBC 7.27 5.45   HGB 9.1* 10.5*   HCT 26.7* 31.6*    165     CMP:   Recent Labs   Lab 08/13/20 2338 08/14/20  0641   * 136   K 4.1 4.8    100   CO2 25 26   * 92   BUN 24* 23   CREATININE 0.8 0.8   CALCIUM 9.2 9.5   PROT 6.3 6.8   ALBUMIN 3.8 3.9   BILITOT 0.2 0.2   ALKPHOS 79 89   AST 14 16   ALT 15 14   ANIONGAP 8 10   EGFRNONAA >60 >60     Magnesium:   Recent Labs   Lab 08/14/20  0641   MG 1.7       Significant Imaging:  US Carotid Bilateral  Narrative: EXAMINATION:  US CAROTID BILATERAL    CLINICAL HISTORY:  syncope;    TECHNIQUE:  Grayscale and color Doppler ultrasound examination of the carotid and vertebral artery systems bilaterally.  Stenosis estimates are per the NASCET measurement criteria.    COMPARISON:  None.    FINDINGS:  Right:    Internal Carotid Artery (ICA):    Peak systolic velocity 114 cm/sec    End diastolic velocity 18 cm/sec    ICA/CCA peak systolic ratio: 1.4    ICA/CCA end diastolic ratio: 2.6    Plaque formation: Homogeneous    Vertebral artery: Antegrade flow and normal waveform.    Left:    Internal Carotid Artery (ICA):    Peak systolic velocity 84 cm/sec    End diastolic velocity 17 cm/sec    ICA/CCA  peak systolic ratio: 1.0    ICA/CCA end diastolic ratio: 1.9    Plaque formation: Homogeneous    Vertebral artery: Retrograde flow with partial occlusion.  Impression: No evidence of a hemodynamically significant carotid bifurcation stenosis.    Reversed flow with partial occlusion within the left vertebral artery suspicious for subclavian steal.  Further evaluation with CTA or MRA is recommended.    This report was flagged in Epic as abnormal.    Electronically signed by: Robert Dodd MD  Date:    08/14/2020  Time:    15:20  Echo Color Flow Doppler? Yes; Bubble Contrast? Yes  · There is no evidence of intracardiac shunting.  · Normal left ventricular systolic function. The estimated ejection   fraction is 55%.  · Normal LV diastolic function.  · The estimated PA systolic pressure is 28 mmHg.  · No wall motion abnormalities.  · Normal right ventricular systolic function.  · Normal central venous pressure (3 mmHg).  · Mild mitral regurgitation.     CT Head Without Contrast  Narrative: EXAMINATION:  CT HEAD WITHOUT CONTRAST    CLINICAL HISTORY:  syncope;    TECHNIQUE:  Low dose axial images were obtained through the head.  Coronal and sagittal reformations were also performed. Contrast was not administered.    COMPARISON:  Head CT performed 11/20/2019    FINDINGS:  No acute intracranial hemorrhage, mass effect, or midline shift.  No evidence of acute transcortical infarct by noncontrast CT.  Nonspecific white matter hypoattenuation is again noted.  Remote lacunar infarct versus prominent perivascular space suggested within the inferior right basal ganglia.  Generalized parenchymal volume loss.  Partially empty sella.    No aggressive osseous lesion is seen.  No displaced fracture.  Trace paranasal sinus mucosal thickening.  Mastoid air cells are clear.  Atherosclerotic vascular calcifications.  Status post bilateral cataract surgery.  Extracranial soft tissue structures are unremarkable.  Impression: 1. No acute  intracranial findings.  2. No significant change relative to 11/20/2019 head CT.    Electronically signed by: Robert Guido  Date:    08/14/2020  Time:    06:26  CTA Chest Non-Coronary (PE Study)  Narrative: EXAMINATION:  CTA CHEST NON CORONARY    CLINICAL HISTORY:  PE suspected, high pretest prob;    TECHNIQUE:  Low dose axial images, sagittal and coronal reformations were obtained from the thoracic inlet to the lung bases following the IV administration of 100 mL of Omnipaque 350.  Contrast timing was optimized to evaluate the pulmonary arteries.  MIP images were performed.    COMPARISON:  Chest CT 01/23/2018; CT chest, abdomen and pelvis 08/29/2018; CT abdomen and pelvis 10/27/2019    FINDINGS:  The visualized soft tissue and vascular structures at the base of the neck are within normal limits.  There is a right-sided chest port in place.  The thoracic aorta maintains normal caliber, contour and course with atherosclerotic plaquing.  The heart is not enlarged.  There is calcific atherosclerosis of the coronary vessels.  No significant pericardial fluid present.  There is no axillary, hilar or mediastinal adenopathy.  The esophagus maintains normal caliber, contour and course.    The trachea is midline and the proximal airways are patent.  There are clustered pulmonary nodules within the right upper and middle lobes and the lingula which appear relatively unchanged in overall distribution and extent compared to prior chest CT examinations from 2018.  There is diffuse mosaic attenuation the pulmonary parenchyma.  There is no focal consolidation, significant volume of pleural fluid or pneumothorax.    Allowing for artifact from dense contrast bolus in the SVC, the pulmonary arteries demonstrate no filling defects to suggest pulmonary thromboembolism.    The visualized structures of the upper abdomen demonstrate no acute abnormalities noting unchanged nodular left adrenal gland thickening and cholecystectomy.  The  visualized osseous structures demonstrate degenerative changes well as remote compression fracture deformity of the T11 vertebral body.  Impression: 1. No evidence of pulmonary thromboembolism.  2. Clustered pulmonary nodules in the right upper and middle lobes and lingula, similar to prior examinations.  Findings can be seen in the setting of underlying atypical infectious process/small airways disease.  Additional diffuse mosaic attenuation of the pulmonary parenchyma is noted which can also be seen with underlying small airways disease.  3. Coronary artery and aortic atherosclerosis, cholecystectomy, T11 vertebral body compression fracture and additional findings as above.    Electronically signed by: Soumya Smith MD  Date:    08/14/2020  Time:    03:59  X-Ray Chest AP Portable  Narrative: EXAMINATION:  XR CHEST AP PORTABLE    CLINICAL HISTORY:  Chest Pain;    TECHNIQUE:  Single frontal view of the chest was performed.    COMPARISON:  11/20/2019    FINDINGS:  Cardiac monitoring leads overlie the chest.  Stably positioned right-sided chest port in place with catheter tip projecting over the SVC.  The cardiomediastinal silhouette is stable.  There is atherosclerosis of the thoracic aorta.  The lungs are symmetrically expanded with slight coarse interstitial attenuation, unchanged.  There is no evidence of confluent airspace consolidation, significant volume of pleural fluid or pneumothorax.  Visualized osseous structures are intact with remote traumatic deformity of the right humeral head.  Impression: No acute intrathoracic abnormality identified on this single radiographic view of chest.    Electronically signed by: Soumya Smith MD  Date:    08/14/2020  Time:    02:00  US Lower Extremity Veins Bilateral  Narrative: EXAMINATION:  US LOWER EXTREMITY VEINS BILATERAL    CLINICAL HISTORY:  Lower extremity edema.    TECHNIQUE:  Duplex and color flow Doppler and dynamic compression was performed of the bilateral lower  extremity veins was performed.    COMPARISON:  Left lower extremity ultrasound from 02/03/2020.    FINDINGS:  Right thigh veins: The common femoral, femoral, popliteal, upper greater saphenous, and deep femoral veins are patent and free of thrombus. The veins are normally compressible and have normal phasic flow and augmentation response.    Right calf veins: The visualized calf veins are patent.    Left thigh veins: There is occlusive deep vein thrombosis of the left common femoral vein, and nonocclusive deep vein thrombosis of the proximal, mid, and distal femoral vein and popliteal vein.    Left calf veins: The visualized calf veins are patent.    Miscellaneous: None  Impression: 1. Redemonstration of left lower extremity deep vein thrombosis involving the left common femoral, femoral, and popliteal veins.  2. No evidence of deep venous thrombosis in the right lower extremity.  This report was flagged in Epic as abnormal.    Electronically signed by: Richard Romero  Date:    08/14/2020  Time:    01:57           Assessment/Plan:      * Syncope  Received fluid bolus in ED  SBP 90s-130s  Continuous Cardiac monitoring  Orthostatic blood pressure ok  Neuro checks Q4hrs  2D Echo with bubble pending  Carotid US pending  CT of head pending  Hold Antihypertensives  Monitor vitals    Will plan on DC lisinopril. Will resume Clonidine in divided dose so as to avoid rebound HTN  Back to NH in AM              DVT of deep femoral vein, left        Chronic anemia  Monitor      Coronary artery disease        Hypotension    Dehydration and over med    Nursing home resident  Plan to return to [facility] when medically stable.      Paroxysmal atrial fibrillation  DVT of deep femoral vein, left    Continue Eliquis- taking 2.5 mg per NH records      Goals of care, counseling/discussion        Counseling regarding advance care planning and goals of care        Palliative care encounter        Urinary retention  Strict  I&O  Monitor      Abdominal pain  Present on admission, currently resolved  monitor      Slow transit constipation  Ostomy in left abdomen with light brown stool. Site is clean. Non distended and non tender.    Continue miralax  Ostomy Care      Old MI (myocardial infarction)  Coronary Artery Disease    Continue Statin    Hyponatremia  Better with hydration      Acquired hypothyroidism  Continue Levothyroxine       Epilepsy  Seizure precautions  Continue trileptal and luminal         VTE Risk Mitigation (From admission, onward)         Ordered     apixaban tablet 2.5 mg  2 times daily      08/14/20 7211     Place sequential compression device  Until discontinued      08/14/20 0516                Discharge Planning   JESSICA: 8/14/2020     Code Status: DNR   Is the patient medically ready for discharge?:     Reason for patient still in hospital (select all that apply): Treatment and Other (specify) Too late to go back to [facility]  Discharge Plan A: Return to nursing home                  Bethany Maciel MD  Department of Hospital Medicine   [facility]

## 2020-08-15 NOTE — ASSESSMENT & PLAN NOTE
Ostomy in left abdomen with light brown stool. Site is clean. Non distended and non tender.    Continue miralax  Ostomy Care

## 2020-08-15 NOTE — HOSPITAL COURSE
Her hemoglobin remained stable.  She was mildly azotemic in received IV fluids.  Electrolytes were good except for a drop in magnesium.  She received a 2 g magnesium rider in addition to the oral magnesium that she takes at home.  Her troponins and BNP were negative.  Her COVID was negative.  Head CT and chest CT were noted.  Chest x-ray noted.  Her lower extremity ultrasound showed known DVT and she is on Eliquis.  Dose was clarified to 2.5 mg p.o. b.i.d. per nursing home paperwork which I reviewed in depth.  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  She will follow-up with cardiology regarding this.     Her blood pressure was initially low.  Her blood pressure medications were held.  Her blood pressure then started trending upward and she was placed on clonidine again to avoid any type of rebound hypertension.  Instead of 0.2 mg once daily she is placed on 0.1 mg twice daily.  This will be monitored closely.      On the day of DC, she is without complaint  She denies dizziness. No CP or SOB. Cannot raise right arm secondary to previous fracture this area and decreased range of motion.  Discussed test results.  She follows with Dr Troy GRACE on Clonidine 0.1 mg po BID  And SANJAY lisinopril

## 2020-08-15 NOTE — PROGRESS NOTES
TN faxed Facility Transfer Orders to pt's senior care NH, [facility] [phone], fax [phone], Juan. She will forward orders to pt's nurse and have nurse call [place] once orders have been reviewed.

## 2020-08-15 NOTE — PLAN OF CARE
TN faxed Facility Transfer Orders to pt's [facility] NH, [facility] [phone], fax [phone], Juan. Per Juan, floor nurse-Cornell to call report to [facility] [phone]. Transfer packet left at nurses station near pt's blue chart. TN contacted pt's daughter Beatris Delgado [phone] to inform her of pt's d/c back to [facility].    Transport by  [facility] order placed with [facility].  time requested for 1:30 pm    Pt's nurse will go over medications/signs and symptoms prior to discharge       08/15/20 1204   Final Note   Assessment Type Final Discharge Note   Anticipated Discharge Disposition care home [facility]  ([facility])   What phone number can be called within the next 1-3 days to see how you are doing after discharge? [phone]   Hospital Follow Up  Appt(s) scheduled? No  (Offices closed for weekend. Patient to schedule own follow up appointment.)   Right Care Referral Info   Post Acute Recommendation Other   Referral Type [facility]   Facility Name [facility]     Gabrielle Josue, RN Transitional Navigator  [phone]

## 2020-08-15 NOTE — PLAN OF CARE
Problem: Wound  Goal: Optimal Wound Healing  Outcome: Ongoing, Progressing     Problem: Fall Injury Risk  Goal: Absence of Fall and Fall-Related Injury  Outcome: Ongoing, Progressing     Problem: Adult Inpatient Plan of Care  Goal: Plan of Care Review  Description: Chart and Care plan reviewed.    Outcome: Ongoing, Progressing  Goal: Patient-Specific Goal (Individualization)  Outcome: Ongoing, Progressing  Goal: Absence of Hospital-Acquired Illness or Injury  Outcome: Ongoing, Progressing  Goal: Optimal Comfort and Wellbeing  Outcome: Ongoing, Progressing  Goal: Readiness for Transition of Care  Outcome: Ongoing, Progressing  Goal: Rounds/Family Conference  Outcome: Ongoing, Progressing     Problem: Hypertension Comorbidity  Goal: Blood Pressure in Desired Range  Outcome: Ongoing, Progressing     Problem: Seizure Disorder Comorbidity  Goal: Maintenance of Seizure Control  Outcome: Ongoing, Progressing     Problem: Syncope  Goal: Absence of Syncopal Symptoms  Outcome: Ongoing, Progressing     Problem: Coping Ineffective  Goal: Effective Coping  Outcome: Ongoing, Progressing     Problem: Skin Injury Risk Increased  Goal: Skin Health and Integrity  Outcome: Ongoing, Progressing     Problem: Wound  Goal: Optimal Wound Healing  Outcome: Ongoing, Progressing     Problem: Fall Injury Risk  Goal: Absence of Fall and Fall-Related Injury  Outcome: Ongoing, Progressing     Problem: Adult Inpatient Plan of Care  Goal: Plan of Care Review  Description: Chart and Care plan reviewed.    Outcome: Ongoing, Progressing     Problem: Adult Inpatient Plan of Care  Goal: Patient-Specific Goal (Individualization)  Outcome: Ongoing, Progressing     Problem: Adult Inpatient Plan of Care  Goal: Absence of Hospital-Acquired Illness or Injury  Outcome: Ongoing, Progressing     Problem: Adult Inpatient Plan of Care  Goal: Optimal Comfort and Wellbeing  Outcome: Ongoing, Progressing

## 2020-08-15 NOTE — ASSESSMENT & PLAN NOTE
Received fluid bolus in ED  SBP 90s-130s  Continuous Cardiac monitoring  Orthostatic blood pressure ok  Neuro checks Q4hrs  2D Echo with bubble pending  Carotid US pending  CT of head pending  Hold Antihypertensives  Monitor vitals    Will plan on DC lisinopril. Will resume Clonidine in divided dose so as to avoid rebound HTN  Back to NH in AM

## 2020-08-15 NOTE — ASSESSMENT & PLAN NOTE
Received fluid bolus in ED  Orthostatic blood pressure ok    2D Echo noted  Carotid US noted  CT of head noted      Will plan on DC lisinopril. Will resume Clonidine in divided dose so as to avoid rebound HTN  Back to NH

## 2020-08-15 NOTE — PLAN OF CARE
VN note: VN completed AVS and attachments and notified bedside nurseCornell. Will cont to be available and intervene prn.

## 2020-08-15 NOTE — DISCHARGE SUMMARY
[facility] Hospital Medicine  Discharge Summary      Patient Name: Annette Garcia  MRN: [id number]  Admission Date: 8/13/2020  Hospital Length of Stay: 0 days  Discharge Date and Time:  08/15/2020 11:50 AM  Attending Physician: Bethany Maciel MD   Discharging Provider: Bethany Maciel MD  Primary Care Provider: Jose Valles MD      HPI:   84 y.o. female with past medical history of peripheral vascular disease, renovascular hypertension, renal artery stenosis status post left renal artery stent placement on 7/19/17, coronary artery disease with history of myocardial infarction, paroxysmal atrial fibrillation, diffuse large B cell lymphoma, anemia, epilepsy, hypothyroidism, chronic hyponatremia, depression, lumbar and sacroiliac joint osteoarthritis with chronic low back pain and history of lumbar spine surgery in 2013, slow transit constipation, history of appendectomy, history of cholecystectomy, history hysterectomy, history of small bowel obstruction status post small bowel resection on 8/23/16, history of sigmoid colectomy with end colostomy on 10/29/19. She lives at [facility] in [place], [place]. She is wheelchair bound. Her primary care physician is Dr. Jose Torres. Her pain management specialist is Dr. Chirag Bates. Her neurologist is Dr. Gamal Lock. Her cardiologist is Dr. Timmy Simmons    She presents to the Emergency Department via EMS due to unwitnessed syncopal episode prior to arrival. Patient was found unresponsive by staff at [facility], but awake and alert when EMS arrived. Associated symptom of dizziness.  She denies any nausea, vomiting, headache, very, chills, cough, congestion, chest pain, palpitations, change in bladder habits or change in bowel habits.  She complains of mild abdominal pain and pain to back of her neck. Denies aggravating or alleviating factors.  Denies history of similar symptoms.  ED findings:  H/H 9.1/26.7, Na 133, covid negative, US of bilateral lower extremities showed chronic left common femoral, femoral, and popliteal veins, CTA of chest showed no PE, chest x-ray with no acute findings, Ct of head pending, patient admitted to hospital medicine for further medical management.    * No surgery found *      Hospital Course:   Her hemoglobin remained stable.  She was mildly azotemic in received IV fluids.  Electrolytes were good except for a drop in magnesium.  She received a 2 g magnesium rider in addition to the oral magnesium that she takes at home.  Her troponins and BNP were negative.  Her COVID was negative.  Head CT and chest CT were noted.  Chest x-ray noted.  Her lower extremity ultrasound showed known DVT and she is on Eliquis.  Dose was clarified to 2.5 mg p.o. b.i.d. per nursing home paperwork which I reviewed in depth.  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  She will follow-up with cardiology regarding this.     Her blood pressure was initially low.  Her blood pressure medications were held.  Her blood pressure then started trending upward and she was placed on clonidine again to avoid any type of rebound hypertension.  Instead of 0.2 mg once daily she is placed on 0.1 mg twice daily.  This will be monitored closely.      On the day of DC, she is without complaint  She denies dizziness. No CP or SOB. Cannot raise right arm secondary to previous fracture this area and decreased range of motion.  Discussed test results.  She follows with Dr Troy GRACE on Clonidine 0.1 mg po BID  And DC lisinopril     Consults:   Consults (From admission, onward)        Status Ordering Provider     Inpatient consult to Palliative Care  Once     Provider:  (Not yet assigned)    Completed INNOCENT-PAULA PA N.          * Syncope  Received fluid bolus in ED  Orthostatic blood pressure  ok    2D Echo noted  Carotid US noted  CT of head noted      Will plan on DC lisinopril. Will resume Clonidine in divided dose so as to avoid rebound HTN  Back to NH               Nursing home resident  Plan to return to [facility] today. Orders written      Paroxysmal atrial fibrillation  DVT of deep femoral vein, left    Continue Eliquis- taking 2.5 mg per NH records      Goals of care, counseling/discussion        Counseling regarding advance care planning and goals of care        Palliative care encounter        Urinary retention  Better      Abdominal pain  Present on admission, currently resolved  monitor      Slow transit constipation  Ostomy in left abdomen with light brown stool. Site is clean. Non distended and non tender.    Continue miralax  Ostomy Care      Old MI (myocardial infarction)  Coronary Artery Disease    Continue Statin    Hyponatremia  Better with hydration      Acquired hypothyroidism  Continue Levothyroxine       Epilepsy  Seizure precautions  Continue trileptal and luminal         Final Active Diagnoses:    Diagnosis Date Noted POA    PRINCIPAL PROBLEM:  Syncope [R55] 08/14/2020 Yes    Hypotension [I95.9] 08/14/2020 Yes    Coronary artery disease [I25.10] 08/14/2020 Yes    Chronic anemia [D64.9] 08/14/2020 Yes    DVT of deep femoral vein, left [I82.412] 08/14/2020 Yes    Nursing home resident [Z59.3] 01/30/2020 Not Applicable     Chronic    Paroxysmal atrial fibrillation [I48.0] 12/28/2019 Yes     Chronic    Palliative care encounter [Z51.5] 11/22/2019 Not Applicable    Goals of care, counseling/discussion [Z71.89] 11/22/2019 Not Applicable    Counseling regarding advance care planning and goals of care [Z71.89] 11/22/2019 Not Applicable    Urinary retention [R33.9] 11/04/2019 Yes     Chronic    Abdominal pain [R10.9] 10/29/2019 Yes    Slow transit constipation [K59.01] 10/28/2019 Yes     Chronic    Old MI (myocardial infarction) [I25.2] 10/28/2019 Not Applicable     Chronic     Hyponatremia [E87.1] 01/26/2017 Yes    Epilepsy [G40.909] 08/18/2016 Yes     Chronic    Acquired hypothyroidism [E03.9] 08/18/2016 Yes     Chronic      Problems Resolved During this Admission:       Discharged Condition: good    Disposition: Nursing Facility with Pl*    Follow Up:  Follow-up Information     Jose Valles MD In 1 week.    Specialty: Family Medicine  Contact information:  [address] [facility] [place] [address]  [phone]             Timmy Simmons MD In 1 week.    Specialties: INTERVENTIONAL CARDIOLOGY, Cardiology  Why: FU subclavian steal  Contact information:  [address]  [address]  [facility] [address]  [phone]                 Patient Instructions:      Diet Cardiac     Activity as tolerated       Significant Diagnostic Studies: Labs:   BMP:   Recent Labs   Lab 08/13/20 2338 08/14/20  0641 08/15/20  0432   * 92  --    * 136  --    K 4.1 4.8  --     100  --    CO2 25 26  --    BUN 24* 23  --    CREATININE 0.8 0.8  --    CALCIUM 9.2 9.5  --    MG  --  1.7 1.5*   , CMP   Recent Labs   Lab 08/13/20 2338 08/14/20  0641   * 136   K 4.1 4.8    100   CO2 25 26   * 92   BUN 24* 23   CREATININE 0.8 0.8   CALCIUM 9.2 9.5   PROT 6.3 6.8   ALBUMIN 3.8 3.9   BILITOT 0.2 0.2   ALKPHOS 79 89   AST 14 16   ALT 15 14   ANIONGAP 8 10   ESTGFRAFRICA >60 >60   EGFRNONAA >60 >60   , CBC   Recent Labs   Lab 08/13/20 2338 08/14/20  0641 08/15/20  0432   WBC 7.27 5.45 4.09   HGB 9.1* 10.5* 9.4*   HCT 26.7* 31.6* 28.8*    165 153   , INR   Lab Results   Component Value Date    INR 1.0 01/31/2020    INR 1.0 01/31/2020    INR 0.9 01/30/2020   , Lipid Panel   Lab Results   Component Value Date    CHOL 220 (H) 05/09/2017    HDL 64 05/09/2017    LDLCALC 124.8 05/09/2017    TRIG 156 (H) 05/09/2017    CHOLHDL 29.1 05/09/2017   , Troponin   Recent Labs   Lab 08/14/20  0152   TROPONINI <0.006    and A1C: No results for input(s): HGBA1C in the last 4320  hours.    Pending Diagnostic Studies:     None         US Carotid Bilateral  Narrative: EXAMINATION:  US CAROTID BILATERAL    CLINICAL HISTORY:  syncope;    TECHNIQUE:  Grayscale and color Doppler ultrasound examination of the carotid and vertebral artery systems bilaterally.  Stenosis estimates are per the NASCET measurement criteria.    COMPARISON:  None.    FINDINGS:  Right:    Internal Carotid Artery (ICA):    Peak systolic velocity 114 cm/sec    End diastolic velocity 18 cm/sec    ICA/CCA peak systolic ratio: 1.4    ICA/CCA end diastolic ratio: 2.6    Plaque formation: Homogeneous    Vertebral artery: Antegrade flow and normal waveform.    Left:    Internal Carotid Artery (ICA):    Peak systolic velocity 84 cm/sec    End diastolic velocity 17 cm/sec    ICA/CCA peak systolic ratio: 1.0    ICA/CCA end diastolic ratio: 1.9    Plaque formation: Homogeneous    Vertebral artery: Retrograde flow with partial occlusion.  Impression: No evidence of a hemodynamically significant carotid bifurcation stenosis.    Reversed flow with partial occlusion within the left vertebral artery suspicious for subclavian steal.  Further evaluation with CTA or MRA is recommended.    This report was flagged in Epic as abnormal.    Electronically signed by: Robert Dodd MD  Date:    08/14/2020  Time:    15:20  Echo Color Flow Doppler? Yes; Bubble Contrast? Yes  · There is no evidence of intracardiac shunting.  · Normal left ventricular systolic function. The estimated ejection   fraction is 55%.  · Normal LV diastolic function.  · The estimated PA systolic pressure is 28 mmHg.  · No wall motion abnormalities.  · Normal right ventricular systolic function.  · Normal central venous pressure (3 mmHg).  · Mild mitral regurgitation.     CT Head Without Contrast  Narrative: EXAMINATION:  CT HEAD WITHOUT CONTRAST    CLINICAL HISTORY:  syncope;    TECHNIQUE:  Low dose axial images were obtained through the head.  Coronal and sagittal  reformations were also performed. Contrast was not administered.    COMPARISON:  Head CT performed 11/20/2019    FINDINGS:  No acute intracranial hemorrhage, mass effect, or midline shift.  No evidence of acute transcortical infarct by noncontrast CT.  Nonspecific white matter hypoattenuation is again noted.  Remote lacunar infarct versus prominent perivascular space suggested within the inferior right basal ganglia.  Generalized parenchymal volume loss.  Partially empty sella.    No aggressive osseous lesion is seen.  No displaced fracture.  Trace paranasal sinus mucosal thickening.  Mastoid air cells are clear.  Atherosclerotic vascular calcifications.  Status post bilateral cataract surgery.  Extracranial soft tissue structures are unremarkable.  Impression: 1. No acute intracranial findings.  2. No significant change relative to 11/20/2019 head CT.    Electronically signed by: Robert Guido  Date:    08/14/2020  Time:    06:26  CTA Chest Non-Coronary (PE Study)  Narrative: EXAMINATION:  CTA CHEST NON CORONARY    CLINICAL HISTORY:  PE suspected, high pretest prob;    TECHNIQUE:  Low dose axial images, sagittal and coronal reformations were obtained from the thoracic inlet to the lung bases following the IV administration of 100 mL of Omnipaque 350.  Contrast timing was optimized to evaluate the pulmonary arteries.  MIP images were performed.    COMPARISON:  Chest CT 01/23/2018; CT chest, abdomen and pelvis 08/29/2018; CT abdomen and pelvis 10/27/2019    FINDINGS:  The visualized soft tissue and vascular structures at the base of the neck are within normal limits.  There is a right-sided chest port in place.  The thoracic aorta maintains normal caliber, contour and course with atherosclerotic plaquing.  The heart is not enlarged.  There is calcific atherosclerosis of the coronary vessels.  No significant pericardial fluid present.  There is no axillary, hilar or mediastinal adenopathy.  The esophagus maintains  normal caliber, contour and course.    The trachea is midline and the proximal airways are patent.  There are clustered pulmonary nodules within the right upper and middle lobes and the lingula which appear relatively unchanged in overall distribution and extent compared to prior chest CT examinations from 2018.  There is diffuse mosaic attenuation the pulmonary parenchyma.  There is no focal consolidation, significant volume of pleural fluid or pneumothorax.    Allowing for artifact from dense contrast bolus in the SVC, the pulmonary arteries demonstrate no filling defects to suggest pulmonary thromboembolism.    The visualized structures of the upper abdomen demonstrate no acute abnormalities noting unchanged nodular left adrenal gland thickening and cholecystectomy.  The visualized osseous structures demonstrate degenerative changes well as remote compression fracture deformity of the T11 vertebral body.  Impression: 1. No evidence of pulmonary thromboembolism.  2. Clustered pulmonary nodules in the right upper and middle lobes and lingula, similar to prior examinations.  Findings can be seen in the setting of underlying atypical infectious process/small airways disease.  Additional diffuse mosaic attenuation of the pulmonary parenchyma is noted which can also be seen with underlying small airways disease.  3. Coronary artery and aortic atherosclerosis, cholecystectomy, T11 vertebral body compression fracture and additional findings as above.    Electronically signed by: Soumya Smith MD  Date:    08/14/2020  Time:    03:59  X-Ray Chest AP Portable  Narrative: EXAMINATION:  XR CHEST AP PORTABLE    CLINICAL HISTORY:  Chest Pain;    TECHNIQUE:  Single frontal view of the chest was performed.    COMPARISON:  11/20/2019    FINDINGS:  Cardiac monitoring leads overlie the chest.  Stably positioned right-sided chest port in place with catheter tip projecting over the SVC.  The cardiomediastinal silhouette is stable.   There is atherosclerosis of the thoracic aorta.  The lungs are symmetrically expanded with slight coarse interstitial attenuation, unchanged.  There is no evidence of confluent airspace consolidation, significant volume of pleural fluid or pneumothorax.  Visualized osseous structures are intact with remote traumatic deformity of the right humeral head.  Impression: No acute intrathoracic abnormality identified on this single radiographic view of chest.    Electronically signed by: Soumya Smith MD  Date:    08/14/2020  Time:    02:00  US Lower Extremity Veins Bilateral  Narrative: EXAMINATION:  US LOWER EXTREMITY VEINS BILATERAL    CLINICAL HISTORY:  Lower extremity edema.    TECHNIQUE:  Duplex and color flow Doppler and dynamic compression was performed of the bilateral lower extremity veins was performed.    COMPARISON:  Left lower extremity ultrasound from 02/03/2020.    FINDINGS:  Right thigh veins: The common femoral, femoral, popliteal, upper greater saphenous, and deep femoral veins are patent and free of thrombus. The veins are normally compressible and have normal phasic flow and augmentation response.    Right calf veins: The visualized calf veins are patent.    Left thigh veins: There is occlusive deep vein thrombosis of the left common femoral vein, and nonocclusive deep vein thrombosis of the proximal, mid, and distal femoral vein and popliteal vein.    Left calf veins: The visualized calf veins are patent.    Miscellaneous: None  Impression: 1. Redemonstration of left lower extremity deep vein thrombosis involving the left common femoral, femoral, and popliteal veins.  2. No evidence of deep venous thrombosis in the right lower extremity.  This report was flagged in Epic as abnormal.    Electronically signed by: Richard Romero  Date:    08/14/2020  Time:    01:57             Medications:  Reconciled Home Medications:      Medication List      START taking these medications    ondansetron 4 MG  Tbdl  Commonly known as: ZOFRAN-ODT  Take 1 tablet (4 mg total) by mouth every 8 (eight) hours as needed.        CHANGE how you take these medications    * acetaminophen 325 MG tablet  Commonly known as: TYLENOL  Take 2 tablets (650 mg total) by mouth every 4 (four) hours as needed for Pain.  What changed: Another medication with the same name was added. Make sure you understand how and when to take each.     * acetaminophen 325 MG tablet  Commonly known as: TYLENOL  Take 2 tablets (650 mg total) by mouth every 4 (four) hours as needed.  What changed: You were already taking a medication with the same name, and this prescription was added. Make sure you understand how and when to take each.     apixaban 2.5 mg Tab  Commonly known as: ELIQUIS  Take 1 tablet (2.5 mg total) by mouth 2 (two) times daily.  What changed:   · medication strength  · See the new instructions.     cloNIDine 0.1 MG tablet  Commonly known as: CATAPRES  Take 1 tablet (0.1 mg total) by mouth 2 (two) times daily.  What changed:   · medication strength  · how much to take  · when to take this  · additional instructions     magnesium oxide 400 mg (241.3 mg magnesium) tablet  Commonly known as: MAG-OX  Take 2 tablets (800 mg total) by mouth once daily.  What changed:   · how much to take  · when to take this         * This list has 2 medication(s) that are the same as other medications prescribed for you. Read the directions carefully, and ask your doctor or other care provider to review them with you.            CONTINUE taking these medications    calcium carbonate 600 mg calcium (1,500 mg) Tab  Commonly known as: OS-RICHARDSON  Take 0.5 tablets (300 mg total) by mouth once daily.     levothyroxine 125 MCG tablet  Commonly known as: SYNTHROID  TAKE 1 TABLET (125 MCG TOTAL) BY MOUTH ONCE DAILY.     mirtazapine 7.5 MG Tab  Commonly known as: REMERON  Take 1 tablet (7.5 mg total) by mouth every evening.     MULTIPLE VITAMINS DAILY ORAL  Take by mouth.      nutritional supplements Liqd  Take 30 mLs by mouth once daily.     ondansetron 4 MG tablet  Commonly known as: ZOFRAN  Take 1 tablet (4 mg total) by mouth every 8 (eight) hours as needed for Nausea.     OXcarbazepine 300 MG Tab  Commonly known as: TRILEPTAL  Take 1 tablet (300 mg total) by mouth nightly.     PHENobarbitaL 97.2 MG tablet  Commonly known as: LUMINAL  Take 1 tablet (97.2 mg total) by mouth every evening.     polyethylene glycol 17 gram Pwpk  Commonly known as: GLYCOLAX  Take 17 g by mouth 2 (two) times daily as needed (Constipation).     pravastatin 20 MG tablet  Commonly known as: PRAVACHOL  Take 1 tablet (20 mg total) by mouth once daily.     VITAMIN C 500 MG tablet  Generic drug: ascorbic acid (vitamin C)  Take 500 mg by mouth once daily.     vitamin D 1000 units Tab  Commonly known as: VITAMIN D3  Take 1,000 Units by mouth once daily.        STOP taking these medications    lisinopriL 40 MG tablet  Commonly known as: PRINIVIL,ZESTRIL            Indwelling Lines/Drains at time of discharge:   Lines/Drains/Airways     Drain                 Colostomy 10/29/19 1200  days                Time spent on the discharge of patient: 35 minutes  Patient was seen and examined on the date of discharge and determined to be suitable for discharge.         Bethany Maciel MD  Department of Hospital Medicine  [facility]

## 2020-08-18 ENCOUNTER — TELEPHONE (OUTPATIENT)
Dept: CARDIOLOGY | Facility: CLINIC | Age: 85
End: 2020-08-18

## 2020-08-18 NOTE — TELEPHONE ENCOUNTER
I  reached out to patient ,and spoke to the Daughter    Ms. Richey.    And we schedule her an appointment to See .    Appointment also mailed to patient home.    Patient confirmed appointment too.      Zohra Wolfe (rita).

## 2020-08-20 ENCOUNTER — OFFICE VISIT (OUTPATIENT)
Dept: CARDIOLOGY | Facility: CLINIC | Age: 85
End: 2020-08-20
Payer: MEDICARE

## 2020-08-20 VITALS — DIASTOLIC BLOOD PRESSURE: 70 MMHG | OXYGEN SATURATION: 80 % | SYSTOLIC BLOOD PRESSURE: 132 MMHG | HEART RATE: 63 BPM

## 2020-08-20 DIAGNOSIS — I25.10 CORONARY ARTERY DISEASE, ANGINA PRESENCE UNSPECIFIED, UNSPECIFIED VESSEL OR LESION TYPE, UNSPECIFIED WHETHER NATIVE OR TRANSPLANTED HEART: ICD-10-CM

## 2020-08-20 DIAGNOSIS — I87.1 MAY-THURNER SYNDROME: ICD-10-CM

## 2020-08-20 DIAGNOSIS — R55 SYNCOPE AND COLLAPSE: ICD-10-CM

## 2020-08-20 DIAGNOSIS — M54.50 CHRONIC BILATERAL LOW BACK PAIN WITHOUT SCIATICA: ICD-10-CM

## 2020-08-20 DIAGNOSIS — T45.1X5A ANEMIA DUE TO ANTINEOPLASTIC CHEMOTHERAPY: Chronic | ICD-10-CM

## 2020-08-20 DIAGNOSIS — I82.412 DVT OF DEEP FEMORAL VEIN, LEFT: ICD-10-CM

## 2020-08-20 DIAGNOSIS — G89.29 CHRONIC BILATERAL LOW BACK PAIN WITHOUT SCIATICA: ICD-10-CM

## 2020-08-20 DIAGNOSIS — I82.512 CHRONIC DEEP VEIN THROMBOSIS (DVT) OF FEMORAL VEIN OF LEFT LOWER EXTREMITY: Primary | ICD-10-CM

## 2020-08-20 DIAGNOSIS — I70.1 BILATERAL RENAL ARTERY STENOSIS: ICD-10-CM

## 2020-08-20 DIAGNOSIS — M47.816 OSTEOARTHRITIS OF LUMBAR SPINE, UNSPECIFIED SPINAL OSTEOARTHRITIS COMPLICATION STATUS: Chronic | ICD-10-CM

## 2020-08-20 DIAGNOSIS — I45.5 SINUS PAUSE: ICD-10-CM

## 2020-08-20 DIAGNOSIS — Z71.89 GOALS OF CARE, COUNSELING/DISCUSSION: ICD-10-CM

## 2020-08-20 DIAGNOSIS — R55 SYNCOPE, UNSPECIFIED SYNCOPE TYPE: ICD-10-CM

## 2020-08-20 DIAGNOSIS — C83.30 DIFFUSE LARGE B-CELL LYMPHOMA, UNSPECIFIED BODY REGION: Chronic | ICD-10-CM

## 2020-08-20 DIAGNOSIS — D64.81 ANEMIA DUE TO ANTINEOPLASTIC CHEMOTHERAPY: Chronic | ICD-10-CM

## 2020-08-20 DIAGNOSIS — Z98.890 HISTORY OF STENT INSERTION OF RENAL ARTERY: Chronic | ICD-10-CM

## 2020-08-20 DIAGNOSIS — I48.0 PAROXYSMAL ATRIAL FIBRILLATION: Chronic | ICD-10-CM

## 2020-08-20 DIAGNOSIS — I95.9 HYPOTENSION, UNSPECIFIED HYPOTENSION TYPE: ICD-10-CM

## 2020-08-20 DIAGNOSIS — Z79.01 CHRONIC ANTICOAGULATION: ICD-10-CM

## 2020-08-20 DIAGNOSIS — I73.9 PERIPHERAL VASCULAR DISEASE, UNSPECIFIED: Chronic | ICD-10-CM

## 2020-08-20 DIAGNOSIS — R60.0 BILATERAL LEG EDEMA: ICD-10-CM

## 2020-08-20 PROCEDURE — 99215 PR OFFICE/OUTPT VISIT, EST, LEVL V, 40-54 MIN: ICD-10-PCS | Mod: S$PBB,,, | Performed by: INTERNAL MEDICINE

## 2020-08-20 PROCEDURE — 99999 PR PBB SHADOW E&M-EST. PATIENT-LVL IV: ICD-10-PCS | Mod: PBBFAC,,, | Performed by: INTERNAL MEDICINE

## 2020-08-20 PROCEDURE — 99214 OFFICE O/P EST MOD 30 MIN: CPT | Mod: PBBFAC,PO | Performed by: INTERNAL MEDICINE

## 2020-08-20 PROCEDURE — 99215 OFFICE O/P EST HI 40 MIN: CPT | Mod: S$PBB,,, | Performed by: INTERNAL MEDICINE

## 2020-08-20 PROCEDURE — 99999 PR PBB SHADOW E&M-EST. PATIENT-LVL IV: CPT | Mod: PBBFAC,,, | Performed by: INTERNAL MEDICINE

## 2020-08-20 RX ORDER — LISINOPRIL 40 MG/1
40 TABLET ORAL DAILY
Status: ON HOLD | COMMUNITY
End: 2020-09-15 | Stop reason: HOSPADM

## 2020-08-20 NOTE — PROGRESS NOTES
Subjective:    Patient ID:  Annette Garcia is a 84 y.o. female who presents for follow-up of Deep Vein Thrombosis and Loss of Consciousness      HPI    83 y/o female with hx DVT, of difficult to control HTN (previously on a diuretic but dicsontinued due to recurrent dehydration - has had prior visits admitting to dietary indiscretion with high sodium diet including Nicole's Pie, Corn bisque, and onion rings previously was V8, canned foods, TV dinners, soft drinks, gumbo, chinese food), renal artery stenosis (LRA severe ostial disease s/p YOLANDA, RRA with mild-mod disease), anemia, acquired hypothyroidism, diffuse large B-cell lymphoma previously on chemo. She presents for hospital f/u.  Was hospitalized for abdominal pain, found to have bowel obstruction, had surgical intervention, developed septic shock requiring intubation, developed JAS, developed afib with sinus pauses, TVP placed, pt slowly improved, extubated, and eventually discharged to NH. Re-admitted for syncopal episode, found to be orthostatic, clinically improved, and discharged back to NH.  Currently mostly wchr bound due to debility post hospitalization. Has gonzales and following with Urology. Followed with Surgery and plan is to keep ostomy longterm.   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 Did well until recently.  Has had multiple syncopal episodes with at least 3-4 over the last year. Most recent episode admitted to hospital, had recently developed LLE edema and pain, and doppler with recurrence of ilio-fem-pop DVT. Continues on Eliquis and has knee high stockings on. States leg does not hurt and has not increased in size recently. No non healing ulceration or signs of limb ischemia. Syncopal episodes are without prodrome. Had an episode in 3/2020  attributed to hypotension. She is mostly wchr bound and does not receive much phys therapy at NH.    Review of Systems   Constitution: Positive for malaise/fatigue.   HENT: Negative for congestion.    Eyes: Negative for blurred vision.   Cardiovascular: Positive for dyspnea on exertion, leg swelling and syncope. Negative for chest pain, claudication, cyanosis, irregular heartbeat, near-syncope, orthopnea, palpitations and paroxysmal nocturnal dyspnea.   Respiratory: Negative for shortness of breath.    Endocrine: Negative for polyuria.   Hematologic/Lymphatic: Negative for bleeding problem.   Skin: Negative for itching and rash.   Musculoskeletal: Positive for muscle weakness. Negative for joint swelling and muscle cramps.   Gastrointestinal: Negative for abdominal pain, hematemesis, hematochezia, melena, nausea and vomiting.   Genitourinary: Negative for dysuria and hematuria.   Neurological: Positive for weakness. Negative for dizziness, focal weakness, headaches, light-headedness and loss of balance.   Psychiatric/Behavioral: Negative for depression. The patient is not nervous/anxious.         Objective:    Physical Exam   Constitutional: She is oriented to person, place, and time. She appears well-developed and well-nourished.   HENT:   Head: Normocephalic and atraumatic.   Neck: Neck supple. No JVD present.   Cardiovascular: Normal rate, regular rhythm and normal heart sounds.   Pulses:       Carotid pulses are 2+ on the right side and 2+ on the left side.       Radial pulses are 2+ on the right side and 2+ on the left side.        Femoral pulses are 2+ on the right side and 2+ on the left side.       Dorsalis pedis pulses are 2+ on the right side and 2+ on the left side.        Posterior tibial pulses are 1+ on the right side and 1+ on the left side.   Pulmonary/Chest: Effort normal and breath sounds normal.   Musculoskeletal:         General: Edema present.   Neurological: She is alert and oriented to person,  place, and time.   Skin: Skin is warm and dry.   Psychiatric: She has a normal mood and affect. Her behavior is normal. Thought content normal.         Assessment:       1. Chronic deep vein thrombosis (DVT) of femoral vein of left lower extremity    2. Syncope and collapse    3. DVT of deep femoral vein, left    4. Chronic anticoagulation    5. May-Thurner syndrome    6. Coronary artery disease, angina presence unspecified, unspecified vessel or lesion type, unspecified whether native or transplanted heart    7. Bilateral renal artery stenosis    8. Hypotension, unspecified hypotension type    9. Peripheral vascular disease, unspecified    10. Paroxysmal atrial fibrillation    11. Sinus pause    12. Osteoarthritis of lumbar spine, unspecified spinal osteoarthritis complication status    13. Anemia due to antineoplastic chemotherapy    14. Diffuse large B-cell lymphoma, unspecified body region    15. Chronic bilateral low back pain without sciatica    16. History of stent insertion of left renal artery 7/19/17    17. Nursing home resident    18. Bilateral leg edema    19. Syncope, unspecified syncope type    20. Goals of care, counseling/discussion      85 y/o pt with hx and presentation as above. Doing well from a cardiac perspective and compensated from a HF perspective. Her syncopal episodes are concerning ayala in the setting of previous arrhythmia's/TVP. Will obtain 30 day event monitor. Has recurrent ilio-fem-pop DVT likely a result of May Thurner's Synd and inactivity/wchr bound. We had an extensive discussion regarding her current condition and poor long term prognosis. I recommend no further invasive procedures (I.e. venogram/angiogram for DVT). I mentioned also the possible findings on monitor which may prompt pacemaker recommendation and we will address this at a later date if necessary. Continue DOAC. Compression stockings - needs waist high. Needs to be more active and should be receiving physical therapy  regularly. Discussed the etiology, evaluation, and management of syncope, DVT, DOAC, Afib, May Thurner, HTN, OA, anemia. Discussed the importance of med compliance, heart healthy diet, and regular exercise.      Plan:       -30 day event monitor  -waist high compression stockings  -Physical therapy  -f/u in 1  month

## 2020-08-21 ENCOUNTER — DOCUMENTATION ONLY (OUTPATIENT)
Dept: PRIMARY CARE CLINIC | Facility: CLINIC | Age: 85
End: 2020-08-21

## 2020-08-22 NOTE — PROGRESS NOTES
[facility]   Evaluate s/p hospitalization   Transfer of Care  DOS 8/21/2020     PRESENTING HISTORY     Chief Complaint/Reason for Admission: Evaluate resident s/p hospitalization for syncope and DVT  Admission Date: 8/13/2020  Discharge Date and Time:  08/15/2020 11:50 AM    History of Present Illness:  Ms. Annette Garcia is a 84 y.o. female who is currently sitting up in the wheelchair with no acute distress. AAOx4. Answering questions appropriately and following commands. Resident states she is doing good. No complaints. LCTAB. No SOB or CP.    She was recently admitted on 8/13 for syncope. Her COVID was negative.  Head CT and chest CT were noted.  Chest x-ray noted.  Her lower extremity ultrasound showed known DVT and she is on Eliquis.  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 She was discharged and to follow up with Cardiology, which she saw Dr. Duvall on 8/20. Syncopal episodes are without prodrome. Had an episode in 3/2020 attributed to hypotension. Dr. Simmons, recommended no further invasive procedures (I.e. venogram/angiogram for DVT). Continue DOAC. Compression stockings - needs waist high. Needs to be more active and should be receiving physical therapy regularly. She will need 30 day cardiac event monitor for syncope.       Review of Systems  General ROS: negative for chills, fever or weight loss  Psychological ROS: negative for hallucination, depression or suicidal ideation  Ophthalmic ROS: negative for blurry vision, photophobia or eye pain  ENT ROS: negative for epistaxis, sore throat or rhinorrhea  Respiratory ROS: no cough, shortness of breath, or wheezing  Cardiovascular ROS: no chest pain or dyspnea on exertion; + leg swelling and syncope  Gastrointestinal ROS: no abdominal pain, change in bowel habits, or black/ bloody stools  Genito-Urinary ROS: no dysuria,  trouble voiding, or hematuria  Musculoskeletal ROS: + muscular weakness  Neurological ROS: no syncope or seizures; no ataxia  Dermatological ROS: negative for pruritis, rash and jaundice        PAST HISTORY:     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    In the setting of septic shock and near death    Petit mal epilepsy 1954    Scoliosis of lumbar spine     Seizures     Unspecified hypothyroidism        Past Surgical History:   Procedure Laterality Date    APPENDECTOMY      BACK SURGERY  1988    vertebral fracture    BACK SURGERY  02/2013    lumbar L2-5    CATARACT EXTRACTION, BILATERAL Bilateral     CHOLECYSTECTOMY      open    EYE SURGERY Bilateral     cataract removal with lens implant    HYSTERECTOMY      PERCUTANEOUS TRANSLUMINAL ANGIOPLASTY (PTA) OF PERIPHERAL VESSEL N/A 2/3/2020    Procedure: PTA, PERIPHERAL VESSEL;  Surgeon: Timmy Simmons MD;  Location: [facility] CATH LAB/EP;  Service: Cardiology;  Laterality: N/A;    PORTACATH PLACEMENT Right 09/2016    RENAL ARTERY STENT Left 07/19/2017    SIGMOIDECTOMY  10/29/2019    SMALL INTESTINE SURGERY  08/23/2016    THROMBECTOMY N/A 2/3/2020    Procedure: THROMBECTOMY;  Surgeon: Timmy Simmons MD;  Location: [facility] CATH LAB/EP;  Service: Cardiology;  Laterality: N/A;    TONSILLECTOMY      VAGINAL HYSTERECTOMY W/ ANTERIOR AND POSTERIOR VAGINAL REPAIR         Family History   Problem Relation Age of Onset    Pancreatic cancer Mother     Hypertension Father     Heart attack Father          MEDICATIONS & ALLERGIES:     Current Outpatient Medications on File Prior to Visit   Medication Sig Dispense Refill    acetaminophen (TYLENOL) 325 MG tablet Take 2 tablets (650 mg total) by mouth every 4 (four) hours as needed for Pain.  0    acetaminophen (TYLENOL)  325 MG tablet Take 2 tablets (650 mg total) by mouth every 4 (four) hours as needed.  0    apixaban (ELIQUIS) 2.5 mg Tab Take 1 tablet (2.5 mg total) by mouth 2 (two) times daily.      ascorbic acid, vitamin C, (VITAMIN C) 500 MG tablet Take 500 mg by mouth once daily.      calcium carbonate (OS-RICHARDSON) 600 mg calcium (1,500 mg) Tab Take 0.5 tablets (300 mg total) by mouth once daily.  0    cloNIDine (CATAPRES) 0.1 MG tablet Take 1 tablet (0.1 mg total) by mouth 2 (two) times daily. 60 tablet 11    levothyroxine (SYNTHROID) 125 MCG tablet TAKE 1 TABLET (125 MCG TOTAL) BY MOUTH ONCE DAILY. 90 tablet 3    lisinopriL (PRINIVIL,ZESTRIL) 40 MG tablet Take 40 mg by mouth once daily.      magnesium oxide (MAG-OX) 400 mg (241.3 mg magnesium) tablet Take 2 tablets (800 mg total) by mouth once daily.  0    mirtazapine (REMERON) 7.5 MG Tab Take 1 tablet (7.5 mg total) by mouth every evening. 30 tablet 11    multivitamin (MULTIPLE VITAMINS DAILY ORAL) Take by mouth.      nutritional supplements Liqd Take 30 mLs by mouth once daily.      ondansetron (ZOFRAN) 4 MG tablet Take 1 tablet (4 mg total) by mouth every 8 (eight) hours as needed for Nausea. 25 tablet 2    ondansetron (ZOFRAN-ODT) 4 MG TbDL Take 1 tablet (4 mg total) by mouth every 8 (eight) hours as needed.      OXcarbazepine (TRILEPTAL) 300 MG Tab Take 1 tablet (300 mg total) by mouth nightly. 90 tablet 3    PHENobarbitaL (LUMINAL) 97.2 MG tablet Take 1 tablet (97.2 mg total) by mouth every evening. 30 tablet 5    polyethylene glycol (GLYCOLAX) 17 gram PwPk Take 17 g by mouth 2 (two) times daily as needed (Constipation).  0    pravastatin (PRAVACHOL) 20 MG tablet Take 1 tablet (20 mg total) by mouth once daily. 90 tablet 3    vitamin D (VITAMIN D3) 1000 units Tab Take 1,000 Units by mouth once daily.       No current facility-administered medications on file prior to visit.         Review of patient's allergies indicates:   Allergen Reactions    Adhesive  Itching and Blisters    Penicillins Anaphylaxis    Tramadol Hives    Avelox [moxifloxacin] Rash     Facial and arm itching and redness. Pt states throat closes when given.    Amoxil [amoxicillin]     Aspridrox [aspirin, buffered]     Codeine Other (See Comments)     Throat swelling    Keflex [cephalexin]      Tolerated cefepime and cefazolin    Norvasc [amlodipine]     Red dye Hives    Robitussin [guaifenesin]     Sulfa (sulfonamide antibiotics)     Tylenol [acetaminophen]      Has reaction to Tylenol with red dye and unable to take Extra Strength Tylenol/ CAN ONLY TOLERATE REG STRENGTH TYLENOL    Vicks vaporub [camphor-eucalyptus oil-menthol]        OBJECTIVE:     Vital Signs:  /71, pulse 80, resp 20, temp 97.3, sp02 98%  Wt Readings from Last 1 Encounters:   08/14/20 1102 60.3 kg (133 lb)   08/14/20 0638 60.5 kg (133 lb 6.1 oz)   08/13/20 2227 59 kg (130 lb)     There is no height or weight on file to calculate BMI.        Physical Exam:  General appearance: alert, cooperative, no distress  Constitutional:Oriented to person, place, and time  + appears well-developed and well-nourished.   HEENT: Normocephalic, atraumatic,  Eyes: conjunctivae/corneas clear, PERRL  Lungs: clear to auscultation bilaterally  Heart: regular rate and rhythm   Abdomen: soft, non-tender; bowel sounds normoactive: +colostomy   Extremities: extremities symmetric; + 2 edema to LLE  Integument: Skin warm and dry to touch  Neurologic: Alert and oriented X 4  Psychiatric: no pressured speech; normal affect; no evidence of impaired cognition     Laboratory  Lab Results   Component Value Date    WBC 4.09 08/15/2020    HGB 9.4 (L) 08/15/2020    HCT 28.8 (L) 08/15/2020     (H) 08/15/2020     08/15/2020     BMP  Lab Results   Component Value Date     08/14/2020    K 4.8 08/14/2020     08/14/2020    CO2 26 08/14/2020    BUN 23 08/14/2020    CREATININE 0.8 08/14/2020    CALCIUM 9.5 08/14/2020    ANIONGAP 10  08/14/2020    ESTGFRAFRICA >60 08/14/2020    EGFRNONAA >60 08/14/2020     Lab Results   Component Value Date    ALT 14 08/14/2020    AST 16 08/14/2020    ALKPHOS 89 08/14/2020    BILITOT 0.2 08/14/2020     Lab Results   Component Value Date    INR 1.0 01/31/2020    INR 1.0 01/31/2020    INR 0.9 01/30/2020         ASSESSMENT & PLAN:     Chronic deep vein thrombosis (DVT) of femoral vein of left lower extremity   - continue on Eliquis 2.5 mg BID.  -waist high compression stockings and follow up with cardiology in 1 month    Syncope  - -30 day event monitor  - follow up with Dr. Simmons, cardiology    Paroxysmal atrial fibrillation  DVT of deep femoral vein, left   Continue Eliquis- taking 2.5 mg bid         Slow transit constipation  Colostomy  Ostomy in left abdomen. Site is clean. Non distended and non tender.    Continue miralax  Ostomy Care        Old MI (myocardial infarction)  Coronary Artery Disease   Continue Statin        Acquired hypothyroidism  Continue Levothyroxine 125 mcg daily        HTN  - on lisinopril and clonidine      Epilepsy  Seizure precautions  Continue trileptal and luminal         Scheduled Follow-up :  Future Appointments   Date Time Provider Department Center   9/1/2020  9:00 AM MONITOR, ARRHYTHMIA EVENT [facility]   10/8/2020 10:20 AM Timmy Simmons MD [facility] CARDIO [place]       Post Visit Medication List:     Medication List          Accurate as of August 21, 2020 11:59 PM. If you have any questions, ask your nurse or doctor.            CONTINUE taking these medications    * acetaminophen 325 MG tablet  Commonly known as: TYLENOL  Take 2 tablets (650 mg total) by mouth every 4 (four) hours as needed for Pain.     * acetaminophen 325 MG tablet  Commonly known as: TYLENOL  Take 2 tablets (650 mg total) by mouth every 4 (four) hours as needed.     apixaban 2.5 mg Tab  Commonly known as: ELIQUIS  Take 1 tablet (2.5 mg total) by mouth 2 (two) times daily.     calcium carbonate 600  mg calcium (1,500 mg) Tab  Commonly known as: OS-RICHARDSON  Take 0.5 tablets (300 mg total) by mouth once daily.     cloNIDine 0.1 MG tablet  Commonly known as: CATAPRES  Take 1 tablet (0.1 mg total) by mouth 2 (two) times daily.     levothyroxine 125 MCG tablet  Commonly known as: SYNTHROID  TAKE 1 TABLET (125 MCG TOTAL) BY MOUTH ONCE DAILY.     lisinopriL 40 MG tablet  Commonly known as: PRINIVIL,ZESTRIL     magnesium oxide 400 mg (241.3 mg magnesium) tablet  Commonly known as: MAG-OX  Take 2 tablets (800 mg total) by mouth once daily.     mirtazapine 7.5 MG Tab  Commonly known as: REMERON  Take 1 tablet (7.5 mg total) by mouth every evening.     MULTIPLE VITAMINS DAILY ORAL     nutritional supplements Liqd  Take 30 mLs by mouth once daily.     ondansetron 4 MG tablet  Commonly known as: ZOFRAN  Take 1 tablet (4 mg total) by mouth every 8 (eight) hours as needed for Nausea.     ondansetron 4 MG Tbdl  Commonly known as: ZOFRAN-ODT  Take 1 tablet (4 mg total) by mouth every 8 (eight) hours as needed.     OXcarbazepine 300 MG Tab  Commonly known as: TRILEPTAL  Take 1 tablet (300 mg total) by mouth nightly.     PHENobarbitaL 97.2 MG tablet  Commonly known as: LUMINAL  Take 1 tablet (97.2 mg total) by mouth every evening.     polyethylene glycol 17 gram Pwpk  Commonly known as: GLYCOLAX  Take 17 g by mouth 2 (two) times daily as needed (Constipation).     pravastatin 20 MG tablet  Commonly known as: PRAVACHOL  Take 1 tablet (20 mg total) by mouth once daily.     VITAMIN C 500 MG tablet  Generic drug: ascorbic acid (vitamin C)     vitamin D 1000 units Tab  Commonly known as: VITAMIN D3         * This list has 2 medication(s) that are the same as other medications prescribed for you. Read the directions carefully, and ask your doctor or other care provider to review them with you.               Coronary Artery Disease: Antiplatelet Therapy: Patient currently on or prescribed on this visit- Eliquis    Atrial Fibrillation and  Atrial Flutter: Chronic Anticoagulation Therapy Patient is prescribed Warfarin OR another FDA-approved oral anticoagulant.    Signing Physician:  Pavithra Warner NP     Due to unresolved technical issue with EMR, Medication list on this note summary may not be accurate.

## 2020-08-31 ENCOUNTER — TELEPHONE (OUTPATIENT)
Dept: CARDIOLOGY | Facility: HOSPITAL | Age: 85
End: 2020-08-31

## 2020-09-01 ENCOUNTER — CLINICAL SUPPORT (OUTPATIENT)
Dept: CARDIOLOGY | Facility: HOSPITAL | Age: 85
End: 2020-09-01
Attending: INTERNAL MEDICINE
Payer: MEDICARE

## 2020-09-01 DIAGNOSIS — R55 SYNCOPE AND COLLAPSE: ICD-10-CM

## 2020-09-01 PROCEDURE — 93272 ECG/REVIEW INTERPRET ONLY: CPT | Mod: ,,, | Performed by: INTERNAL MEDICINE

## 2020-09-01 PROCEDURE — 93272 CARDIAC EVENT MONITOR (CUPID ONLY): ICD-10-PCS | Mod: ,,, | Performed by: INTERNAL MEDICINE

## 2020-09-01 PROCEDURE — 93271 ECG/MONITORING AND ANALYSIS: CPT

## 2020-09-13 ENCOUNTER — HOSPITAL ENCOUNTER (INPATIENT)
Facility: HOSPITAL | Age: 85
LOS: 2 days | Discharge: HOME | DRG: 392 | End: 2020-09-16
Attending: EMERGENCY MEDICINE | Admitting: INTERNAL MEDICINE
Payer: MEDICARE

## 2020-09-13 DIAGNOSIS — R11.2 NAUSEA & VOMITING: ICD-10-CM

## 2020-09-13 DIAGNOSIS — I95.9 HYPOTENSION, UNSPECIFIED HYPOTENSION TYPE: ICD-10-CM

## 2020-09-13 DIAGNOSIS — Z88.9 DRUG ALLERGY, MULTIPLE: ICD-10-CM

## 2020-09-13 DIAGNOSIS — E86.0 DEHYDRATION: ICD-10-CM

## 2020-09-13 DIAGNOSIS — K52.9 COLITIS: Primary | ICD-10-CM

## 2020-09-13 LAB
ALBUMIN SERPL BCP-MCNC: 4.2 G/DL (ref 3.5–5.2)
ALP SERPL-CCNC: 94 U/L (ref 55–135)
ALT SERPL W/O P-5'-P-CCNC: 16 U/L (ref 10–44)
ANION GAP SERPL CALC-SCNC: 13 MMOL/L (ref 8–16)
AST SERPL-CCNC: 18 U/L (ref 10–40)
BASOPHILS # BLD AUTO: 0.02 K/UL (ref 0–0.2)
BASOPHILS NFR BLD: 0.2 % (ref 0–1.9)
BILIRUB SERPL-MCNC: 0.2 MG/DL (ref 0.1–1)
BILIRUB UR QL STRIP: NEGATIVE
BUN SERPL-MCNC: 28 MG/DL (ref 8–23)
CALCIUM SERPL-MCNC: 9.6 MG/DL (ref 8.7–10.5)
CHLORIDE SERPL-SCNC: 99 MMOL/L (ref 95–110)
CLARITY UR: CLEAR
CO2 SERPL-SCNC: 25 MMOL/L (ref 23–29)
COLOR UR: YELLOW
CREAT SERPL-MCNC: 1.1 MG/DL (ref 0.5–1.4)
DIFFERENTIAL METHOD: ABNORMAL
EOSINOPHIL # BLD AUTO: 0.1 K/UL (ref 0–0.5)
EOSINOPHIL NFR BLD: 0.6 % (ref 0–8)
ERYTHROCYTE [DISTWIDTH] IN BLOOD BY AUTOMATED COUNT: 12.5 % (ref 11.5–14.5)
EST. GFR  (AFRICAN AMERICAN): 54 ML/MIN/1.73 M^2
EST. GFR  (NON AFRICAN AMERICAN): 47 ML/MIN/1.73 M^2
GLUCOSE SERPL-MCNC: 131 MG/DL (ref 70–110)
GLUCOSE UR QL STRIP: NEGATIVE
HCT VFR BLD AUTO: 31.5 % (ref 37–48.5)
HGB BLD-MCNC: 10.4 G/DL (ref 12–16)
HGB UR QL STRIP: NEGATIVE
IMM GRANULOCYTES # BLD AUTO: 0.04 K/UL (ref 0–0.04)
IMM GRANULOCYTES NFR BLD AUTO: 0.4 % (ref 0–0.5)
KETONES UR QL STRIP: NEGATIVE
LACTATE SERPL-SCNC: 3.7 MMOL/L (ref 0.5–2.2)
LACTATE SERPL-SCNC: 4 MMOL/L (ref 0.5–2.2)
LEUKOCYTE ESTERASE UR QL STRIP: NEGATIVE
LYMPHOCYTES # BLD AUTO: 1.2 K/UL (ref 1–4.8)
LYMPHOCYTES NFR BLD: 12 % (ref 18–48)
MCH RBC QN AUTO: 34.1 PG (ref 27–31)
MCHC RBC AUTO-ENTMCNC: 33 G/DL (ref 32–36)
MCV RBC AUTO: 103 FL (ref 82–98)
MONOCYTES # BLD AUTO: 0.7 K/UL (ref 0.3–1)
MONOCYTES NFR BLD: 7.6 % (ref 4–15)
NEUTROPHILS # BLD AUTO: 7.6 K/UL (ref 1.8–7.7)
NEUTROPHILS NFR BLD: 79.2 % (ref 38–73)
NITRITE UR QL STRIP: NEGATIVE
NRBC BLD-RTO: 0 /100 WBC
PH UR STRIP: 6 [PH] (ref 5–8)
PLATELET # BLD AUTO: 171 K/UL (ref 150–350)
PMV BLD AUTO: 10.2 FL (ref 9.2–12.9)
POTASSIUM SERPL-SCNC: 4.3 MMOL/L (ref 3.5–5.1)
PROCALCITONIN SERPL IA-MCNC: 0.02 NG/ML
PROT SERPL-MCNC: 6.9 G/DL (ref 6–8.4)
PROT UR QL STRIP: NEGATIVE
RBC # BLD AUTO: 3.05 M/UL (ref 4–5.4)
SARS-COV-2 RDRP RESP QL NAA+PROBE: NEGATIVE
SODIUM SERPL-SCNC: 137 MMOL/L (ref 136–145)
SP GR UR STRIP: 1.02 (ref 1–1.03)
URN SPEC COLLECT METH UR: NORMAL
UROBILINOGEN UR STRIP-ACNC: NEGATIVE EU/DL
WBC # BLD AUTO: 9.59 K/UL (ref 3.9–12.7)

## 2020-09-13 PROCEDURE — 81003 URINALYSIS AUTO W/O SCOPE: CPT

## 2020-09-13 PROCEDURE — 25000003 PHARM REV CODE 250: Performed by: EMERGENCY MEDICINE

## 2020-09-13 PROCEDURE — 93005 ELECTROCARDIOGRAM TRACING: CPT

## 2020-09-13 PROCEDURE — 25500020 PHARM REV CODE 255: Performed by: EMERGENCY MEDICINE

## 2020-09-13 PROCEDURE — 96375 TX/PRO/DX INJ NEW DRUG ADDON: CPT

## 2020-09-13 PROCEDURE — 96361 HYDRATE IV INFUSION ADD-ON: CPT

## 2020-09-13 PROCEDURE — 99291 CRITICAL CARE FIRST HOUR: CPT | Mod: 25

## 2020-09-13 PROCEDURE — G0378 HOSPITAL OBSERVATION PER HR: HCPCS

## 2020-09-13 PROCEDURE — 84145 PROCALCITONIN (PCT): CPT

## 2020-09-13 PROCEDURE — S0030 INJECTION, METRONIDAZOLE: HCPCS | Performed by: NURSE PRACTITIONER

## 2020-09-13 PROCEDURE — 93010 ELECTROCARDIOGRAM REPORT: CPT | Mod: ,,, | Performed by: INTERNAL MEDICINE

## 2020-09-13 PROCEDURE — 25000003 PHARM REV CODE 250: Performed by: NURSE PRACTITIONER

## 2020-09-13 PROCEDURE — 96374 THER/PROPH/DIAG INJ IV PUSH: CPT

## 2020-09-13 PROCEDURE — 83605 ASSAY OF LACTIC ACID: CPT | Mod: 91

## 2020-09-13 PROCEDURE — U0002 COVID-19 LAB TEST NON-CDC: HCPCS

## 2020-09-13 PROCEDURE — 85025 COMPLETE CBC W/AUTO DIFF WBC: CPT

## 2020-09-13 PROCEDURE — 80053 COMPREHEN METABOLIC PANEL: CPT

## 2020-09-13 PROCEDURE — 63600175 PHARM REV CODE 636 W HCPCS: Performed by: EMERGENCY MEDICINE

## 2020-09-13 PROCEDURE — 87040 BLOOD CULTURE FOR BACTERIA: CPT

## 2020-09-13 PROCEDURE — 93010 EKG 12-LEAD: ICD-10-PCS | Mod: ,,, | Performed by: INTERNAL MEDICINE

## 2020-09-13 RX ORDER — CEFEPIME HYDROCHLORIDE 1 G/50ML
2 INJECTION, SOLUTION INTRAVENOUS
Status: DISCONTINUED | OUTPATIENT
Start: 2020-09-14 | End: 2020-09-15

## 2020-09-13 RX ORDER — TALC
6 POWDER (GRAM) TOPICAL NIGHTLY PRN
Status: DISCONTINUED | OUTPATIENT
Start: 2020-09-13 | End: 2020-09-16 | Stop reason: HOSPADM

## 2020-09-13 RX ORDER — SODIUM CHLORIDE 9 MG/ML
1000 INJECTION, SOLUTION INTRAVENOUS
Status: COMPLETED | OUTPATIENT
Start: 2020-09-13 | End: 2020-09-13

## 2020-09-13 RX ORDER — CEFEPIME HYDROCHLORIDE 1 G/50ML
2 INJECTION, SOLUTION INTRAVENOUS
Status: COMPLETED | OUTPATIENT
Start: 2020-09-13 | End: 2020-09-13

## 2020-09-13 RX ORDER — METRONIDAZOLE 500 MG/100ML
500 INJECTION, SOLUTION INTRAVENOUS
Status: DISCONTINUED | OUTPATIENT
Start: 2020-09-13 | End: 2020-09-14

## 2020-09-13 RX ORDER — CEFEPIME HYDROCHLORIDE 1 G/50ML
2 INJECTION, SOLUTION INTRAVENOUS
Status: DISCONTINUED | OUTPATIENT
Start: 2020-09-14 | End: 2020-09-13

## 2020-09-13 RX ORDER — ONDANSETRON 2 MG/ML
4 INJECTION INTRAMUSCULAR; INTRAVENOUS EVERY 6 HOURS PRN
Status: DISCONTINUED | OUTPATIENT
Start: 2020-09-13 | End: 2020-09-16 | Stop reason: HOSPADM

## 2020-09-13 RX ORDER — PRAVASTATIN SODIUM 10 MG/1
20 TABLET ORAL DAILY
Status: DISCONTINUED | OUTPATIENT
Start: 2020-09-14 | End: 2020-09-16 | Stop reason: HOSPADM

## 2020-09-13 RX ORDER — PHENOBARBITAL 32.4 MG/1
97.2 TABLET ORAL NIGHTLY
Status: DISCONTINUED | OUTPATIENT
Start: 2020-09-13 | End: 2020-09-16 | Stop reason: HOSPADM

## 2020-09-13 RX ORDER — OXCARBAZEPINE 150 MG/1
300 TABLET, FILM COATED ORAL NIGHTLY
Status: DISCONTINUED | OUTPATIENT
Start: 2020-09-13 | End: 2020-09-16 | Stop reason: HOSPADM

## 2020-09-13 RX ORDER — SODIUM CHLORIDE 9 MG/ML
INJECTION, SOLUTION INTRAVENOUS CONTINUOUS
Status: DISCONTINUED | OUTPATIENT
Start: 2020-09-13 | End: 2020-09-14

## 2020-09-13 RX ORDER — MIRTAZAPINE 7.5 MG/1
7.5 TABLET, FILM COATED ORAL NIGHTLY
Status: DISCONTINUED | OUTPATIENT
Start: 2020-09-13 | End: 2020-09-16 | Stop reason: HOSPADM

## 2020-09-13 RX ADMIN — SODIUM CHLORIDE 500 ML: 0.9 INJECTION, SOLUTION INTRAVENOUS at 09:09

## 2020-09-13 RX ADMIN — SODIUM CHLORIDE 809 ML: 0.9 INJECTION, SOLUTION INTRAVENOUS at 06:09

## 2020-09-13 RX ADMIN — IOHEXOL 75 ML: 350 INJECTION, SOLUTION INTRAVENOUS at 07:09

## 2020-09-13 RX ADMIN — CEFEPIME HYDROCHLORIDE 2 G: 2 INJECTION, SOLUTION INTRAVENOUS at 04:09

## 2020-09-13 RX ADMIN — METRONIDAZOLE 500 MG: 500 INJECTION, SOLUTION INTRAVENOUS at 09:09

## 2020-09-13 RX ADMIN — PHENOBARBITAL 97.2 MG: 32.4 TABLET ORAL at 09:09

## 2020-09-13 RX ADMIN — MIRTAZAPINE 7.5 MG: 7.5 TABLET ORAL at 09:09

## 2020-09-13 RX ADMIN — OXCARBAZEPINE 300 MG: 150 TABLET, FILM COATED ORAL at 09:09

## 2020-09-13 RX ADMIN — SODIUM CHLORIDE 1000 ML: 0.9 INJECTION, SOLUTION INTRAVENOUS at 04:09

## 2020-09-13 RX ADMIN — APIXABAN 2.5 MG: 2.5 TABLET, FILM COATED ORAL at 09:09

## 2020-09-13 NOTE — ED NOTES
Upon arrival, pt colostomy bag changed, stool was liquid and brown, pt cleaned mepilex placed to stage 2 pressure ulcer noted to scral area
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
## 2020-09-14 PROBLEM — Z93.3 COLOSTOMY IN PLACE: Status: ACTIVE | Noted: 2020-09-14

## 2020-09-14 LAB
ALBUMIN SERPL BCP-MCNC: 3.4 G/DL (ref 3.5–5.2)
ALP SERPL-CCNC: 73 U/L (ref 55–135)
ALT SERPL W/O P-5'-P-CCNC: 16 U/L (ref 10–44)
ANION GAP SERPL CALC-SCNC: 8 MMOL/L (ref 8–16)
AST SERPL-CCNC: 18 U/L (ref 10–40)
BASOPHILS # BLD AUTO: 0.02 K/UL (ref 0–0.2)
BASOPHILS NFR BLD: 0.2 % (ref 0–1.9)
BILIRUB SERPL-MCNC: 0.2 MG/DL (ref 0.1–1)
BUN SERPL-MCNC: 22 MG/DL (ref 8–23)
CALCIUM SERPL-MCNC: 8.5 MG/DL (ref 8.7–10.5)
CHLORIDE SERPL-SCNC: 106 MMOL/L (ref 95–110)
CO2 SERPL-SCNC: 25 MMOL/L (ref 23–29)
CREAT SERPL-MCNC: 0.9 MG/DL (ref 0.5–1.4)
DIFFERENTIAL METHOD: ABNORMAL
EOSINOPHIL # BLD AUTO: 0 K/UL (ref 0–0.5)
EOSINOPHIL NFR BLD: 0.2 % (ref 0–8)
ERYTHROCYTE [DISTWIDTH] IN BLOOD BY AUTOMATED COUNT: 12.8 % (ref 11.5–14.5)
EST. GFR  (AFRICAN AMERICAN): >60 ML/MIN/1.73 M^2
EST. GFR  (NON AFRICAN AMERICAN): 59 ML/MIN/1.73 M^2
GLUCOSE SERPL-MCNC: 115 MG/DL (ref 70–110)
HCT VFR BLD AUTO: 26.3 % (ref 37–48.5)
HGB BLD-MCNC: 8.4 G/DL (ref 12–16)
IMM GRANULOCYTES # BLD AUTO: 0.02 K/UL (ref 0–0.04)
IMM GRANULOCYTES NFR BLD AUTO: 0.2 % (ref 0–0.5)
LACTATE SERPL-SCNC: 1.3 MMOL/L (ref 0.5–2.2)
LYMPHOCYTES # BLD AUTO: 1.5 K/UL (ref 1–4.8)
LYMPHOCYTES NFR BLD: 18 % (ref 18–48)
MAGNESIUM SERPL-MCNC: 1.4 MG/DL (ref 1.6–2.6)
MCH RBC QN AUTO: 33.3 PG (ref 27–31)
MCHC RBC AUTO-ENTMCNC: 31.9 G/DL (ref 32–36)
MCV RBC AUTO: 104 FL (ref 82–98)
MONOCYTES # BLD AUTO: 0.7 K/UL (ref 0.3–1)
MONOCYTES NFR BLD: 7.7 % (ref 4–15)
NEUTROPHILS # BLD AUTO: 6.3 K/UL (ref 1.8–7.7)
NEUTROPHILS NFR BLD: 73.7 % (ref 38–73)
NRBC BLD-RTO: 0 /100 WBC
PHOSPHATE SERPL-MCNC: 3 MG/DL (ref 2.7–4.5)
PLATELET # BLD AUTO: 139 K/UL (ref 150–350)
PMV BLD AUTO: 10.6 FL (ref 9.2–12.9)
POTASSIUM SERPL-SCNC: 4.2 MMOL/L (ref 3.5–5.1)
PROT SERPL-MCNC: 5.8 G/DL (ref 6–8.4)
RBC # BLD AUTO: 2.52 M/UL (ref 4–5.4)
SODIUM SERPL-SCNC: 139 MMOL/L (ref 136–145)
WBC # BLD AUTO: 8.52 K/UL (ref 3.9–12.7)

## 2020-09-14 PROCEDURE — 63600175 PHARM REV CODE 636 W HCPCS: Performed by: HOSPITALIST

## 2020-09-14 PROCEDURE — 96361 HYDRATE IV INFUSION ADD-ON: CPT

## 2020-09-14 PROCEDURE — 97802 MEDICAL NUTRITION INDIV IN: CPT

## 2020-09-14 PROCEDURE — S0030 INJECTION, METRONIDAZOLE: HCPCS | Performed by: NURSE PRACTITIONER

## 2020-09-14 PROCEDURE — 96375 TX/PRO/DX INJ NEW DRUG ADDON: CPT

## 2020-09-14 PROCEDURE — 83605 ASSAY OF LACTIC ACID: CPT

## 2020-09-14 PROCEDURE — 94761 N-INVAS EAR/PLS OXIMETRY MLT: CPT

## 2020-09-14 PROCEDURE — 80053 COMPREHEN METABOLIC PANEL: CPT

## 2020-09-14 PROCEDURE — 85025 COMPLETE CBC W/AUTO DIFF WBC: CPT

## 2020-09-14 PROCEDURE — 36415 COLL VENOUS BLD VENIPUNCTURE: CPT

## 2020-09-14 PROCEDURE — 25000003 PHARM REV CODE 250: Performed by: NURSE PRACTITIONER

## 2020-09-14 PROCEDURE — 63600175 PHARM REV CODE 636 W HCPCS: Performed by: NURSE PRACTITIONER

## 2020-09-14 PROCEDURE — 83735 ASSAY OF MAGNESIUM: CPT

## 2020-09-14 PROCEDURE — 11000001 HC ACUTE MED/SURG PRIVATE ROOM

## 2020-09-14 PROCEDURE — 96376 TX/PRO/DX INJ SAME DRUG ADON: CPT

## 2020-09-14 PROCEDURE — 84100 ASSAY OF PHOSPHORUS: CPT

## 2020-09-14 PROCEDURE — 25000003 PHARM REV CODE 250: Performed by: HOSPITALIST

## 2020-09-14 RX ORDER — METRONIDAZOLE 500 MG/1
500 TABLET ORAL EVERY 8 HOURS
Status: DISCONTINUED | OUTPATIENT
Start: 2020-09-14 | End: 2020-09-16 | Stop reason: HOSPADM

## 2020-09-14 RX ORDER — IBUPROFEN 400 MG/1
400 TABLET ORAL ONCE
Status: DISCONTINUED | OUTPATIENT
Start: 2020-09-14 | End: 2020-09-14

## 2020-09-14 RX ORDER — MAGNESIUM SULFATE HEPTAHYDRATE 40 MG/ML
2 INJECTION, SOLUTION INTRAVENOUS ONCE
Status: COMPLETED | OUTPATIENT
Start: 2020-09-14 | End: 2020-09-14

## 2020-09-14 RX ORDER — HEPARIN SODIUM,PORCINE 10 UNIT/ML
50 VIAL (ML) INTRAVENOUS
Status: ON HOLD | COMMUNITY
End: 2022-05-23 | Stop reason: HOSPADM

## 2020-09-14 RX ORDER — MULTIVITAMIN
1 TABLET ORAL DAILY
COMMUNITY

## 2020-09-14 RX ADMIN — MIRTAZAPINE 7.5 MG: 7.5 TABLET ORAL at 08:09

## 2020-09-14 RX ADMIN — SODIUM CHLORIDE: 0.9 INJECTION, SOLUTION INTRAVENOUS at 02:09

## 2020-09-14 RX ADMIN — CEFEPIME HYDROCHLORIDE 2 G: 2 INJECTION, SOLUTION INTRAVENOUS at 04:09

## 2020-09-14 RX ADMIN — PRAVASTATIN SODIUM 20 MG: 10 TABLET ORAL at 08:09

## 2020-09-14 RX ADMIN — Medication 6 MG: at 02:09

## 2020-09-14 RX ADMIN — APIXABAN 2.5 MG: 2.5 TABLET, FILM COATED ORAL at 08:09

## 2020-09-14 RX ADMIN — METRONIDAZOLE 500 MG: 500 TABLET ORAL at 09:09

## 2020-09-14 RX ADMIN — METRONIDAZOLE 500 MG: 500 INJECTION, SOLUTION INTRAVENOUS at 06:09

## 2020-09-14 RX ADMIN — LEVOTHYROXINE SODIUM 125 MCG: 75 TABLET ORAL at 08:09

## 2020-09-14 RX ADMIN — Medication 6 MG: at 08:09

## 2020-09-14 RX ADMIN — OXCARBAZEPINE 300 MG: 150 TABLET, FILM COATED ORAL at 08:09

## 2020-09-14 RX ADMIN — APIXABAN 5 MG: 5 TABLET, FILM COATED ORAL at 08:09

## 2020-09-14 RX ADMIN — PHENOBARBITAL 97.2 MG: 32.4 TABLET ORAL at 08:09

## 2020-09-14 RX ADMIN — CEFEPIME HYDROCHLORIDE 2 G: 2 INJECTION, SOLUTION INTRAVENOUS at 05:09

## 2020-09-14 RX ADMIN — MAGNESIUM SULFATE IN WATER 2 G: 40 INJECTION, SOLUTION INTRAVENOUS at 08:09

## 2020-09-14 RX ADMIN — METRONIDAZOLE 500 MG: 500 TABLET ORAL at 02:09

## 2020-09-14 NOTE — ASSESSMENT & PLAN NOTE
History of stent insertion of left renal artery 7/19/17  Old MI    Denies chest pain of SOB  Continue statin

## 2020-09-14 NOTE — PLAN OF CARE
Pt is a resident of [facility].  She will discharge charge back to [facility] when discharged.  White board updated with CM name and contact information.  Discharge brochure provided.  Pt encouraged to call with any questions or concerns.  Cm will continue to follow pt through transitions of care and assist with any discharge needs.       09/14/20 1647   Discharge Assessment   Assessment Type Discharge Planning Assessment   Confirmed/corrected address and phone number on facesheet? Yes   Assessment information obtained from? Patient   Communicated expected length of stay with patient/caregiver yes   Prior to hospitilization cognitive status: Alert/Oriented   Prior to hospitalization functional status: Wheelchair Bound;Needs Assistance   Current cognitive status: Alert/Oriented   Current Functional Status: Wheelchair Bound;Needs Assistance   Lives With facility resident   Able to Return to Prior Arrangements yes   Is patient able to care for self after discharge? No   Who are your caregiver(s) and their phone number(s)? Amira (pt's daughter)   Patient's perception of discharge disposition nursing home   Readmission Within the Last 30 Days no previous admission in last 30 days   Patient currently being followed by outpatient case management? No   Patient currently receives any other outside agency services? No   Equipment Currently Used at Home wheelchair;hospital bed   Do you have any problems affording any of your prescribed medications? No   Is the patient taking medications as prescribed? yes   Does the patient have transportation home? Yes   Transportation Anticipated other (see comments)   Discharge Plan A Return to nursing home   Discharge Plan B Return to Nursing Home   DME Needed Upon Discharge  none   Patient/Family in Agreement with Plan yes     Phil Vallejo RN,   [phone]

## 2020-09-14 NOTE — PLAN OF CARE
Problem: Adult Inpatient Plan of Care  Goal: Plan of Care Review  Outcome: Ongoing, Progressing   VIRTUAL NURSE:  Cued into patient's room.  Permission received per patient to turn camera to view patient.  Introduced as VN for night shift that will be working with floor nurse and nursing assistant.  Nurse at bedside. Educated patient on VN's role in patient care and  VIP model.  Plan of care reviewed with patient.  Education per flowsheet.   Informed patient that staff will round on them every 2 hours but to use call light for any other needs they may have; informed of fall risk and fall precautions.  Patient verbalized understanding.  Call light within reach; bed siderails up x3.  Opportunity given for questions and questions answered.  Admission assessment questions answered.  Patient denies complaints or any needs at this time. Instructed to call for assistance.  Will cont to monitor and intervene as needed.    Labs, notes, orders, and careplan reviewed.

## 2020-09-14 NOTE — ASSESSMENT & PLAN NOTE
Colostomy in place  Lactic acidosis  Nausea and vomiting    CT concerning for colitis  Continue cefepime and flagyl  IVFs  Repeat Lactic pending  Advance diet as tolerated  Prn antiemetics

## 2020-09-14 NOTE — ASSESSMENT & PLAN NOTE
Contributing Nutrition Diagnosis  Altered GI Function    Related to (etiology):   Colitis, N/V    Signs and Symptoms (as evidenced by):   CL diet    Interventions:  Collaboration with other providers  Commercial beverage    Nutrition Diagnosis Status:   New

## 2020-09-14 NOTE — ASSESSMENT & PLAN NOTE
CT concerning for colitis  Initiated on cefepime and flagyl, continue for now and likely transition to oral tomorrow  IVFs on admission, will hold now since tolerating diet  Advance diet as tolerated  Prn antiemetics

## 2020-09-14 NOTE — SUBJECTIVE & OBJECTIVE
Interval History: Greatly improved today; states that her back pain is much better from when she arrived. Denies any abdominal pain. No increased ostomy output or bloody stools.    Review of Systems   Constitutional: Negative for fever.   Respiratory: Negative for shortness of breath.    Cardiovascular: Negative for chest pain.   Gastrointestinal: Negative for abdominal pain, diarrhea, nausea and vomiting.   Musculoskeletal: Positive for back pain.   Neurological: Positive for weakness.     Objective:     Vital Signs (Most Recent):  Temp: 98.6 °F (37 °C) (09/14/20 1649)  Pulse: 79 (09/14/20 1649)  Resp: 19 (09/14/20 1649)  BP: (!) 121/56 (09/14/20 1649)  SpO2: 96 % (09/14/20 1649) Vital Signs (24h Range):  Temp:  [98 °F (36.7 °C)-99.4 °F (37.4 °C)] 98.6 °F (37 °C)  Pulse:  [] 79  Resp:  [10-33] 19  SpO2:  [91 %-100 %] 96 %  BP: (108-176)/(54-70) 121/56     Weight: 64.6 kg (142 lb 6.7 oz)  Body mass index is 30.82 kg/m².    Intake/Output Summary (Last 24 hours) at 9/14/2020 1831  Last data filed at 9/14/2020 1650  Gross per 24 hour   Intake 1856.5 ml   Output 1350 ml   Net 506.5 ml      Physical Exam  Constitutional:       Appearance: Normal appearance.   HENT:      Head: Normocephalic and atraumatic.      Nose: No congestion or rhinorrhea.      Mouth/Throat:      Mouth: Mucous membranes are moist.   Eyes:      Extraocular Movements: Extraocular movements intact.      Pupils: Pupils are equal, round, and reactive to light.   Neck:      Musculoskeletal: Normal range of motion and neck supple.   Cardiovascular:      Rate and Rhythm: Normal rate.   Pulmonary:      Effort: No respiratory distress.      Breath sounds: Normal breath sounds.   Abdominal:      General: Bowel sounds are normal.      Comments: Ostomy to left abdomen, site is pink and well perfused.    Musculoskeletal:         General: No tenderness or deformity.   Skin:     General: Skin is warm.   Neurological:      General: No focal deficit present.       Mental Status: She is alert and oriented to person, place, and time.   Psychiatric:         Mood and Affect: Mood normal.         Significant Labs: All pertinent labs within the past 24 hours have been reviewed.    Significant Imaging: I have reviewed and interpreted all pertinent imaging results/findings within the past 24 hours.

## 2020-09-14 NOTE — PROGRESS NOTES
Pharmacist Intervention IV to PO Note    Annette Garcia is a 85 y.o. female being treated with IV medication metronidazole    Patient Data:    Vital Signs (Most Recent):  Temp: 99.4 °F (37.4 °C) (09/14/20 0759)  Pulse: 89 (09/14/20 0759)  Resp: 18 (09/14/20 0759)  BP: (!) 108/54 (09/14/20 0759)  SpO2: (!) 92 % (09/14/20 0759)   Vital Signs (72h Range):  Temp:  [98 °F (36.7 °C)-99.4 °F (37.4 °C)]   Pulse:  []   Resp:  [10-33]   BP: ()/(43-70)   SpO2:  [91 %-100 %]      CBC:  Recent Labs   Lab 09/13/20  1624 09/14/20  0413   WBC 9.59 8.52   RBC 3.05* 2.52*   HGB 10.4* 8.4*   HCT 31.5* 26.3*    139*   * 104*   MCH 34.1* 33.3*   MCHC 33.0 31.9*     CMP:     Recent Labs   Lab 09/13/20  1624 09/14/20  0413   * 115*   CALCIUM 9.6 8.5*   ALBUMIN 4.2 3.4*   PROT 6.9 5.8*    139   K 4.3 4.2   CO2 25 25   CL 99 106   BUN 28* 22   CREATININE 1.1 0.9   ALKPHOS 94 73   ALT 16 16   AST 18 18   BILITOT 0.2 0.2       Dietary Orders:  Diet Orders            Diet clear liquid Thin: Clear Liquid starting at 09/14 0709            Based on the following criteria, this patient qualifies for intravenous to oral conversion:  [x] The patients gastrointestinal tract is functioning (tolerating medications via oral or enteral route for 24 hours and tolerating food or enteral feeds for 24 hours).  [x] The patient is hemodynamically stable for 24 hours (heart rate <100 beats per minute, systolic blood pressure >99 mm Hg, and respiratory rate <20 breaths per minute).  [x] The patient shows clinical improvement (afebrile for at least 24 hours and white blood cell count downtrending or normalized). Additionally, the patient must be non-neutropenic (absolute neutrophil count >500 cells/mm3).  [x] For antimicrobials, the patient has received IV therapy for at least 24 hours.    IV medication metronidazole will be changed to oral medication     Pharmacist's Name: Lorne Allred  Pharmacist's Extension: [phone]

## 2020-09-14 NOTE — PLAN OF CARE
Recommendation:   1. Encourage intake of CL diet as tolerated.   2. Add Boost Breeze bid.   3. Advance diet to low residue as tolerated.   4. RD to follow to monitor duet tolerance/po   intake    Goals:   Pt will tolerate diet with at least 50% intake at naheed;s by RD follow up  Nutrition Goal Status: new

## 2020-09-14 NOTE — SUBJECTIVE & OBJECTIVE
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    In the setting of septic shock and near death    Petit mal epilepsy 1954    Scoliosis of lumbar spine     Seizures     Unspecified hypothyroidism        Past Surgical History:   Procedure Laterality Date    APPENDECTOMY      BACK SURGERY  1988    vertebral fracture    BACK SURGERY  02/2013    lumbar L2-5    CATARACT EXTRACTION, BILATERAL Bilateral     CHOLECYSTECTOMY      open    EYE SURGERY Bilateral     cataract removal with lens implant    HYSTERECTOMY      PERCUTANEOUS TRANSLUMINAL ANGIOPLASTY (PTA) OF PERIPHERAL VESSEL N/A 2/3/2020    Procedure: PTA, PERIPHERAL VESSEL;  Surgeon: Timmy Simmons MD;  Location: [facility] CATH LAB/EP;  Service: Cardiology;  Laterality: N/A;    PORTACATH PLACEMENT Right 09/2016    RENAL ARTERY STENT Left 07/19/2017    SIGMOIDECTOMY  10/29/2019    SMALL INTESTINE SURGERY  08/23/2016    THROMBECTOMY N/A 2/3/2020    Procedure: THROMBECTOMY;  Surgeon: Timmy Simmons MD;  Location: [facility] CATH LAB/EP;  Service: Cardiology;  Laterality: N/A;    TONSILLECTOMY      VAGINAL HYSTERECTOMY W/ ANTERIOR AND POSTERIOR VAGINAL REPAIR         Review of patient's allergies indicates:   Allergen Reactions    Adhesive Itching and Blisters    Penicillins Anaphylaxis    Tramadol Hives    Avelox [moxifloxacin] Rash     Facial and arm itching and redness. Pt states throat closes when given.    Amoxil [amoxicillin]     Aspridrox [aspirin, buffered]     Codeine Other (See Comments)     Throat swelling    Keflex [cephalexin]      Tolerated cefepime and cefazolin    Norvasc [amlodipine]     Red dye Hives    Robitussin [guaifenesin]     Sulfa (sulfonamide antibiotics)     Tylenol [acetaminophen]      Has reaction to Tylenol with red dye and  unable to take Extra Strength Tylenol/ CAN ONLY TOLERATE REG STRENGTH TYLENOL    Vicks vaporub [camphor-eucalyptus oil-menthol]        No current facility-administered medications on file prior to encounter.      Current Outpatient Medications on File Prior to Encounter   Medication Sig    acetaminophen (TYLENOL) 325 MG tablet Take 2 tablets (650 mg total) by mouth every 4 (four) hours as needed for Pain.    acetaminophen (TYLENOL) 325 MG tablet Take 2 tablets (650 mg total) by mouth every 4 (four) hours as needed.    apixaban (ELIQUIS) 2.5 mg Tab Take 1 tablet (2.5 mg total) by mouth 2 (two) times daily.    ascorbic acid, vitamin C, (VITAMIN C) 500 MG tablet Take 500 mg by mouth once daily.    calcium carbonate (OS-RICHARDSON) 600 mg calcium (1,500 mg) Tab Take 0.5 tablets (300 mg total) by mouth once daily.    cloNIDine (CATAPRES) 0.1 MG tablet Take 1 tablet (0.1 mg total) by mouth 2 (two) times daily.    levothyroxine (SYNTHROID) 125 MCG tablet TAKE 1 TABLET (125 MCG TOTAL) BY MOUTH ONCE DAILY.    lisinopriL (PRINIVIL,ZESTRIL) 40 MG tablet Take 40 mg by mouth once daily.    magnesium oxide (MAG-OX) 400 mg (241.3 mg magnesium) tablet Take 2 tablets (800 mg total) by mouth once daily.    mirtazapine (REMERON) 7.5 MG Tab Take 1 tablet (7.5 mg total) by mouth every evening.    multivitamin (MULTIPLE VITAMINS DAILY ORAL) Take by mouth.    nutritional supplements Liqd Take 30 mLs by mouth once daily.    ondansetron (ZOFRAN) 4 MG tablet Take 1 tablet (4 mg total) by mouth every 8 (eight) hours as needed for Nausea.    ondansetron (ZOFRAN-ODT) 4 MG TbDL Take 1 tablet (4 mg total) by mouth every 8 (eight) hours as needed.    OXcarbazepine (TRILEPTAL) 300 MG Tab Take 1 tablet (300 mg total) by mouth nightly.    PHENobarbitaL (LUMINAL) 97.2 MG tablet Take 1 tablet (97.2 mg total) by mouth every evening.    polyethylene glycol (GLYCOLAX) 17 gram PwPk Take 17 g by mouth 2 (two) times daily as needed (Constipation).     pravastatin (PRAVACHOL) 20 MG tablet Take 1 tablet (20 mg total) by mouth once daily.    vitamin D (VITAMIN D3) 1000 units Tab Take 1,000 Units by mouth once daily.     Family History     Problem Relation (Age of Onset)    Heart attack Father    Hypertension Father    Pancreatic cancer Mother        Tobacco Use    Smoking status: Never Smoker    Smokeless tobacco: Never Used   Substance and Sexual Activity    Alcohol use: No    Drug use: No    Sexual activity: Not on file     Review of Systems   Constitutional: Negative for chills and fever.   HENT: Negative for congestion, postnasal drip and rhinorrhea.    Eyes: Negative for visual disturbance.   Respiratory: Negative for chest tightness and shortness of breath.    Cardiovascular: Negative for chest pain and palpitations.   Gastrointestinal: Positive for nausea and vomiting. Negative for abdominal pain and blood in stool.   Genitourinary: Negative for difficulty urinating.   Musculoskeletal: Negative for arthralgias and myalgias.   Skin: Negative for color change and wound.   Neurological: Negative for dizziness, facial asymmetry, weakness, light-headedness and headaches.   Hematological: Does not bruise/bleed easily.   Psychiatric/Behavioral: Negative for agitation.     Objective:     Vital Signs (Most Recent):  Temp: 98.3 °F (36.8 °C) (09/13/20 2203)  Pulse: 101 (09/13/20 2203)  Resp: (!) 21 (09/13/20 2203)  BP: (!) 164/69 (09/13/20 2203)  SpO2: (!) 94 % (09/13/20 2203) Vital Signs (24h Range):  Temp:  [98 °F (36.7 °C)-98.3 °F (36.8 °C)] 98.3 °F (36.8 °C)  Pulse:  [] 101  Resp:  [10-33] 21  SpO2:  [94 %-100 %] 94 %  BP: ()/(43-70) 164/69     Weight: 60.3 kg (133 lb)  Body mass index is 28.78 kg/m².    Physical Exam  Constitutional:       Appearance: Normal appearance.   HENT:      Head: Normocephalic and atraumatic.      Nose: No congestion or rhinorrhea.      Mouth/Throat:      Mouth: Mucous membranes are moist.   Eyes:      Extraocular  Movements: Extraocular movements intact.      Pupils: Pupils are equal, round, and reactive to light.   Neck:      Musculoskeletal: Normal range of motion and neck supple.   Cardiovascular:      Rate and Rhythm: Normal rate.   Pulmonary:      Effort: No respiratory distress.      Breath sounds: Normal breath sounds.   Abdominal:      General: Bowel sounds are normal.      Comments: Ostomy to left abdomen, site is pink and well perfused.    Musculoskeletal:         General: No tenderness or deformity.   Skin:     General: Skin is warm.   Neurological:      General: No focal deficit present.      Mental Status: She is alert and oriented to person, place, and time.   Psychiatric:         Mood and Affect: Mood normal.           CRANIAL NERVES     CN III, IV, VI   Pupils are equal, round, and reactive to light.       Significant Labs:     Bilirubin:   Recent Labs   Lab 09/13/20  1624   BILITOT 0.2     BMP:   Recent Labs   Lab 09/13/20  1624   *      K 4.3   CL 99   CO2 25   BUN 28*   CREATININE 1.1   CALCIUM 9.6     CBC:   Recent Labs   Lab 09/13/20  1624   WBC 9.59   HGB 10.4*   HCT 31.5*        CMP:   Recent Labs   Lab 09/13/20  1624      K 4.3   CL 99   CO2 25   *   BUN 28*   CREATININE 1.1   CALCIUM 9.6   PROT 6.9   ALBUMIN 4.2   BILITOT 0.2   ALKPHOS 94   AST 18   ALT 16   ANIONGAP 13   EGFRNONAA 47*     Lactic Acid:   Recent Labs   Lab 09/13/20  1624 09/13/20  2047   LACTATE 3.7* 4.0*     Urine Studies:   Recent Labs   Lab 09/13/20  1539   COLORU Yellow   APPEARANCEUA Clear   PHUR 6.0   SPECGRAV 1.020   PROTEINUA Negative   GLUCUA Negative   KETONESU Negative   BILIRUBINUA Negative   OCCULTUA Negative   NITRITE Negative   UROBILINOGEN Negative   LEUKOCYTESUR Negative       Significant Imaging: I have reviewed all pertinent imaging results/findings within the past 24 hours.

## 2020-09-14 NOTE — ED NOTES
Spoke with patient daughter Beatris on the phone and patient spoke with her as well. Gave her an update on patient.
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
## 2020-09-14 NOTE — ASSESSMENT & PLAN NOTE
Hypotensive on admission  Hold antihypertensives  - possibly due to infection vs GI losses from nausea

## 2020-09-14 NOTE — PLAN OF CARE
"0130H- Admitted 85 year old very pleasant old lady,  brought in to the unit via stretcher from ED. Patient from [facility]. Came in to the hospital due to excessive vomiting.     Patient conscious , coherent to time, place date, and situation. GCS-15. With saline lock on the left forearm gauge 22 , flushed patent, with clean and dry dressing. Started LR at 75 cc/hr infusing well. Patient has subjective complaint of headache 5/10, verbalized "I am allergic to tylenol but I take 0.25 mg of tylenol for headache" MICHELL Mayo  updated about the patient's condition.Will give ibuprofen po.     Stable VS, afebrile, but patient's  skin very warm to touch, with facial flushes.Bilateral leg with glass skin, with (+3) non-pitting edema. Colostomy bag in place new bag changed in ED. Draining clear yellow to brown stool. Buttocks cheek area with stage 2 bed sore, wound care rendered. Pure wick  applied. Seizure precautions observed.     Telemetry Normal sinus rhythm  (90's). Oxygen saturation 91% on room air.     Advised patient to call for any assistance. Safety fall precaution measures noted. Bed alarm on. Call bell with in reach. VN model explained. Will continue to monitor patient.    0200H- Patient refused ibuprofen, verbalized " I'd be fine" kept comfortable, melatonin given. Will continue to monitor patient.     0552H- Patient stable VS over the night, afebrile. No untoward signs and symptoms noted over the night. Telemetry no ectopy. Free from fall. IV line patent. LR at 75 cc/hr infusing well. 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          "
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
## 2020-09-14 NOTE — ASSESSMENT & PLAN NOTE
History of stent insertion of left renal artery 7/19/17    Denies chest pain or SOB  Continue statin and apixaban

## 2020-09-14 NOTE — CONSULTS
"  [facility]  Adult Nutrition  Consult Note    SUMMARY     Recommendations    Recommendation:   1. Encourage intake of CL diet as tolerated.   2. Add Boost Breeze bid.   3. Advance diet to low residue as tolerated.   4. RD to follow to monitor duet tolerance/po   intake    Goals:   Pt will tolerate diet with at least 50% intake at naheed;s by RD follow up  Nutrition Goal Status: new    Reason for Assessment  Reason For Assessment: consult(wound buttocks)  Diagnosis: (colitits)  Relevant Medical History: HTN, scoliosis of lumbar spine, colon CA, CAD, cholecystectomy, sigmoidectomy  General Information Comments: Pt on Clear Liquid diet. Tolerating per WENDY Arenas 12-stg I coccyx. NFPE completed today 9/14-appears nourished at this time. Pt admitted from [facility] with diarrhea and vomiting.  Nutrition Discharge Planning: d/c diet to be determined    Nutrition Risk Screen  Nutrition Risk Screen: no indicators present    Nutrition/Diet History  Food Preferences: no Confucianist or cultural food prefs identified  Factors Affecting Nutritional Intake: altered gastrointestinal function    Anthropometrics  Temp: 98.6 °F (37 °C)  Height Method: Stated  Height: 4' 9" (144.8 cm)  Height (inches): 57 in  Weight Method: Bed Scale  Weight: 64.6 kg (142 lb 6.7 oz)  Weight (lb): 142.42 lb  Ideal Body Weight (IBW), Female: 85 lb  % Ideal Body Weight, Female (lb): 167.55 %  BMI (Calculated): 30.8  BMI Grade: 30 - 34.9- obesity - grade I     Lab/Procedures/Meds  Pertinent Labs Reviewed: reviewed  Pertinent Labs Comments: Glu 115H, Ca 8.5L, Mg 1.4L, Al 3.4L  Pertinent Medications Reviewed: reviewed  Pertinent Medications Comments: NaCl    Estimated/Assessed Needs  Weight Used For Calorie Calculations: 64.6 kg (142 lb 6.7 oz)  Energy Calorie Requirements (kcal): 1615 (25 kcal/kg)  Energy Need Method: Kcal/kg  Protein Requirements: 64g (1.0g/kg)  Weight Used For Protein Calculations: 64.6 kg (142 lb 6.7 oz)  Estimated Fluid " Requirement Method: RDA Method  RDA Method (mL): 1615     Nutrition Prescription Ordered  Current Diet Order: Clear Liquid    Evaluation of Received Nutrient/Fluid Intake  I/O: 1856/350  Energy Calories Required: not meeting needs  Protein Required: not meeting needs  Fluid Required: not meeting needs  Comments: LBM 9/14  % Intake of Estimated Energy Needs: 25 - 50 %  % Meal Intake: 25 - 50 %    Nutrition Risk  Level of Risk/Frequency of Follow-up: (2xweekly)     Assessment and Plan  * Colitis  Contributing Nutrition Diagnosis  Altered GI Function    Related to (etiology):   Colitis, N/V    Signs and Symptoms (as evidenced by):   CL diet    Interventions:  Collaboration with other providers  Commercial beverage    Nutrition Diagnosis Status:   New    Monitor and Evaluation  Food and Nutrient Intake: food and beverage intake  Food and Nutrient Adminstration: diet order  Physical Activity and Function: nutrition-related ADLs and IADLs  Anthropometric Measurements: weight  Biochemical Data, Medical Tests and Procedures: electrolyte and renal panel  Nutrition-Focused Physical Findings: overall appearance     Malnutrition Assessment   Subcutaneous Fat Loss (Final Summary): well nourished  Muscle Loss Evaluation (Final Summary): well nourished       Nutrition Follow-Up  RD Follow-up?: Yes
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
## 2020-09-15 LAB
ANION GAP SERPL CALC-SCNC: 6 MMOL/L (ref 8–16)
BASOPHILS # BLD AUTO: 0.02 K/UL (ref 0–0.2)
BASOPHILS NFR BLD: 0.4 % (ref 0–1.9)
BUN SERPL-MCNC: 13 MG/DL (ref 8–23)
CALCIUM SERPL-MCNC: 8.7 MG/DL (ref 8.7–10.5)
CHLORIDE SERPL-SCNC: 105 MMOL/L (ref 95–110)
CO2 SERPL-SCNC: 25 MMOL/L (ref 23–29)
CREAT SERPL-MCNC: 0.7 MG/DL (ref 0.5–1.4)
DIFFERENTIAL METHOD: ABNORMAL
EOSINOPHIL # BLD AUTO: 0.3 K/UL (ref 0–0.5)
EOSINOPHIL NFR BLD: 4.8 % (ref 0–8)
ERYTHROCYTE [DISTWIDTH] IN BLOOD BY AUTOMATED COUNT: 12.9 % (ref 11.5–14.5)
EST. GFR  (AFRICAN AMERICAN): >60 ML/MIN/1.73 M^2
EST. GFR  (NON AFRICAN AMERICAN): >60 ML/MIN/1.73 M^2
GLUCOSE SERPL-MCNC: 90 MG/DL (ref 70–110)
HCT VFR BLD AUTO: 25.7 % (ref 37–48.5)
HGB BLD-MCNC: 8.2 G/DL (ref 12–16)
IMM GRANULOCYTES # BLD AUTO: 0 K/UL (ref 0–0.04)
IMM GRANULOCYTES NFR BLD AUTO: 0 % (ref 0–0.5)
LYMPHOCYTES # BLD AUTO: 1.5 K/UL (ref 1–4.8)
LYMPHOCYTES NFR BLD: 28.5 % (ref 18–48)
MAGNESIUM SERPL-MCNC: 1.8 MG/DL (ref 1.6–2.6)
MCH RBC QN AUTO: 33.5 PG (ref 27–31)
MCHC RBC AUTO-ENTMCNC: 31.9 G/DL (ref 32–36)
MCV RBC AUTO: 105 FL (ref 82–98)
MONOCYTES # BLD AUTO: 0.5 K/UL (ref 0.3–1)
MONOCYTES NFR BLD: 8.7 % (ref 4–15)
NEUTROPHILS # BLD AUTO: 3 K/UL (ref 1.8–7.7)
NEUTROPHILS NFR BLD: 57.6 % (ref 38–73)
NRBC BLD-RTO: 0 /100 WBC
PHOSPHATE SERPL-MCNC: 2.5 MG/DL (ref 2.7–4.5)
PLATELET # BLD AUTO: 118 K/UL (ref 150–350)
PMV BLD AUTO: 10.5 FL (ref 9.2–12.9)
POTASSIUM SERPL-SCNC: 4.1 MMOL/L (ref 3.5–5.1)
RBC # BLD AUTO: 2.45 M/UL (ref 4–5.4)
SODIUM SERPL-SCNC: 136 MMOL/L (ref 136–145)
WBC # BLD AUTO: 5.2 K/UL (ref 3.9–12.7)

## 2020-09-15 PROCEDURE — 83735 ASSAY OF MAGNESIUM: CPT

## 2020-09-15 PROCEDURE — 63600175 PHARM REV CODE 636 W HCPCS: Performed by: NURSE PRACTITIONER

## 2020-09-15 PROCEDURE — 11000001 HC ACUTE MED/SURG PRIVATE ROOM

## 2020-09-15 PROCEDURE — 36415 COLL VENOUS BLD VENIPUNCTURE: CPT

## 2020-09-15 PROCEDURE — 85025 COMPLETE CBC W/AUTO DIFF WBC: CPT

## 2020-09-15 PROCEDURE — 94761 N-INVAS EAR/PLS OXIMETRY MLT: CPT

## 2020-09-15 PROCEDURE — 25000003 PHARM REV CODE 250: Performed by: HOSPITALIST

## 2020-09-15 PROCEDURE — 84100 ASSAY OF PHOSPHORUS: CPT

## 2020-09-15 PROCEDURE — 80048 BASIC METABOLIC PNL TOTAL CA: CPT

## 2020-09-15 PROCEDURE — 63600175 PHARM REV CODE 636 W HCPCS: Performed by: HOSPITALIST

## 2020-09-15 PROCEDURE — 25000003 PHARM REV CODE 250: Performed by: NURSE PRACTITIONER

## 2020-09-15 RX ORDER — HYDRALAZINE HYDROCHLORIDE 20 MG/ML
10 INJECTION INTRAMUSCULAR; INTRAVENOUS EVERY 8 HOURS PRN
Status: DISCONTINUED | OUTPATIENT
Start: 2020-09-15 | End: 2020-09-16 | Stop reason: HOSPADM

## 2020-09-15 RX ORDER — CEFPODOXIME PROXETIL 100 MG/5ML
200 GRANULE, FOR SUSPENSION ORAL 2 TIMES DAILY
Qty: 200 ML | Refills: 0
Start: 2020-09-15 | End: 2022-05-20

## 2020-09-15 RX ORDER — METRONIDAZOLE 500 MG/1
500 TABLET ORAL EVERY 8 HOURS
Start: 2020-09-15 | End: 2022-05-20

## 2020-09-15 RX ORDER — CEFEPIME HYDROCHLORIDE 1 G/50ML
2 INJECTION, SOLUTION INTRAVENOUS
Status: DISCONTINUED | OUTPATIENT
Start: 2020-09-15 | End: 2020-09-16 | Stop reason: HOSPADM

## 2020-09-15 RX ADMIN — METRONIDAZOLE 500 MG: 500 TABLET ORAL at 08:09

## 2020-09-15 RX ADMIN — Medication 6 MG: at 08:09

## 2020-09-15 RX ADMIN — PHENOBARBITAL 97.2 MG: 32.4 TABLET ORAL at 08:09

## 2020-09-15 RX ADMIN — CEFEPIME HYDROCHLORIDE 2 G: 2 INJECTION, SOLUTION INTRAVENOUS at 04:09

## 2020-09-15 RX ADMIN — CEFEPIME HYDROCHLORIDE 2 G: 2 INJECTION, SOLUTION INTRAVENOUS at 08:09

## 2020-09-15 RX ADMIN — HYDRALAZINE HYDROCHLORIDE 10 MG: 20 INJECTION INTRAMUSCULAR; INTRAVENOUS at 08:09

## 2020-09-15 RX ADMIN — LEVOTHYROXINE SODIUM 125 MCG: 75 TABLET ORAL at 07:09

## 2020-09-15 RX ADMIN — APIXABAN 5 MG: 5 TABLET, FILM COATED ORAL at 07:09

## 2020-09-15 RX ADMIN — OXCARBAZEPINE 300 MG: 150 TABLET, FILM COATED ORAL at 08:09

## 2020-09-15 RX ADMIN — METRONIDAZOLE 500 MG: 500 TABLET ORAL at 05:09

## 2020-09-15 RX ADMIN — MIRTAZAPINE 7.5 MG: 7.5 TABLET ORAL at 08:09

## 2020-09-15 RX ADMIN — CEFEPIME HYDROCHLORIDE 2 G: 2 INJECTION, SOLUTION INTRAVENOUS at 11:09

## 2020-09-15 RX ADMIN — PRAVASTATIN SODIUM 20 MG: 10 TABLET ORAL at 07:09

## 2020-09-15 RX ADMIN — METRONIDAZOLE 500 MG: 500 TABLET ORAL at 01:09

## 2020-09-15 RX ADMIN — APIXABAN 5 MG: 5 TABLET, FILM COATED ORAL at 08:09

## 2020-09-15 NOTE — ASSESSMENT & PLAN NOTE
CT concerning for colitis  Initiated on cefepime and flagyl, continue for now and transition to oral for discharge  IVFs on admission, will hold now since tolerating diet  Advance diet as tolerated  Prn antiemetics
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
## 2020-09-15 NOTE — PLAN OF CARE
Facility transfer orders and all updated notes sent to [facility] via [facility].  I called and left a  for pt's daughter Beatris [phone].       09/15/20 1017   Post-Acute Status   Post-Acute Authorization Placement  ([facility])   Post-Acute Placement Status Referrals Sent     Phil Vallejo RN,   [phone]

## 2020-09-15 NOTE — PROGRESS NOTES
Pharmacist Renal Dose Adjustment Note    Annette Garcia is a 85 y.o. female being treated with the medication cefepime    Patient Data:    Vital Signs (Most Recent):  Temp: 97.9 °F (36.6 °C) (09/15/20 0742)  Pulse: 78 (09/15/20 0742)  Resp: 16 (09/15/20 0742)  BP: (!) 147/62 (09/15/20 0742)  SpO2: (!) 94 % (09/15/20 0827)   Vital Signs (72h Range):  Temp:  [97.9 °F (36.6 °C)-99.4 °F (37.4 °C)]   Pulse:  []   Resp:  [10-33]   BP: ()/(43-70)   SpO2:  [91 %-100 %]      Recent Labs   Lab 09/13/20  1624 09/14/20  0413 09/15/20  0523   CREATININE 1.1 0.9 0.7     Serum creatinine: 0.7 mg/dL 09/15/20 0523  Estimated creatinine clearance: 62.1 mL/min    Medication:cefepime dose: 2gram frequency q12h will be changed to medication:cefepime dose:2grams frequency:q8h    Pharmacist's Name: Lorne Allred  Pharmacist's Extension: [phone]

## 2020-09-15 NOTE — PLAN OF CARE
Problem: Fall Injury Risk  Goal: Absence of Fall and Fall-Related Injury  Outcome: Ongoing, Progressing     Problem: Wound  Goal: Optimal Wound Healing  Outcome: Ongoing, Progressing

## 2020-09-15 NOTE — PLAN OF CARE
"1930H- Received patient upon rounds, patient seen in bed, lying on semi-barrios's position.   Patient conscious , coherent to time, place date, and situation. GCS-15. Very pleasant old lady. With saline lock on the left forearm gauge 22 , flushed patent, with clean and dry dressing. Patient has no subjective complaint of pain upon rounds.      Stable VS, afebrile. Bilateral leg with glass skin, with (+3) non-pitting edema. Colostomy bag in place, draining clear with  yellow to brown stool. Buttocks cheek area with stage 2 bed sore, wound care rendered. Pure wick  in place draining clear yellow urine. Seizure precautions observed.Telemetry Normal sinus rhythm  (90's). Oxygen saturation 91% on room air.      Advised patient to call for any assistance. Safety fall precaution measures noted. Bed alarm on. Call bell with in reach. VN model explained. Will continue to monitor patient.    2100H- (Beatris) patient's daughter updated about patient's condition. Transferred to the bedside phone, patient able to talk to her daughter over the phone. Patient verbalized " I am very worried if they are going to discharge me or not tomorrow". Patient reassured that she is safe while in the hospital. Kept comfortable in bed. Will continue to monitor patient.    0525H- Patient stable VS over the night, afebrile. No untoward signs and symptoms noted over the night. No complaints of nausea nor vomiting. Slept well.  Telemetry no ectopy. Free from fall. IV line patent.Pure wick replace. Will endorse patient to day shift Nurse.      "

## 2020-09-15 NOTE — PLAN OF CARE
[facility]     Department of Hospital Medicine     [address] [place] [address]     [phone] after hours  [phone] fax       NURSING HOME ORDERS    09/16/2020    Admit to Nursing Home:  Regular Bed         Diagnoses:  Active Hospital Problems    Diagnosis  POA    *Colitis [K52.9]  Yes    Colostomy in place [Z93.3]  Not Applicable    Chronic anticoagulation [Z79.01]  Not Applicable    DVT of deep femoral vein, left [I82.412]  Yes    Coronary artery disease [I25.10]  Yes    Chronic anemia [D64.9]  Yes    Hypotensive episode [I95.9]  Yes    Nursing home resident [Z59.3]  Not Applicable     Chronic [facility] ([address], [place] [address])       Paroxysmal atrial fibrillation [I48.0]  Yes     Chronic    Hypomagnesemia [E83.42]  Yes    Old MI (myocardial infarction) [I25.2]  Not Applicable     Chronic    Depression [F32.9]  Yes    History of stent insertion of left renal artery 7/19/17 [Z98.890]  Not Applicable     Chronic    Epilepsy [G40.909]  Yes     Chronic     Epilepsy type unknown      Acquired hypothyroidism [E03.9]  Yes     Chronic      Resolved Hospital Problems    Diagnosis Date Resolved POA    Lactic acidosis [E87.2] 09/15/2020 Yes    Nausea and vomiting [R11.2] 09/15/2020 Yes       Patient is homebound due to:  Colitis    Allergies:  Review of patient's allergies indicates:   Allergen Reactions    Adhesive Itching and Blisters    Penicillins Anaphylaxis    Tramadol Hives    Avelox [moxifloxacin] Rash     Facial and arm itching and redness. Pt states throat closes when given.    Amoxil [amoxicillin]     Aspridrox [aspirin, buffered]     Codeine Other (See Comments)     Throat swelling    Keflex [cephalexin]      Tolerated cefepime and cefazolin    Norvasc [amlodipine]     Red dye Hives    Robitussin [guaifenesin]     Sulfa (sulfonamide antibiotics)     Tylenol [acetaminophen]      Has  reaction to Tylenol with red dye and unable to take Extra Strength Tylenol/ CAN ONLY TOLERATE REG STRENGTH TYLENOL    Vicks vaporub [camphor-eucalyptus oil-menthol]        Vitals:       Once weekly       Diet: cardiac   Supplement:  1 can every three times a day with meals                         Type:  House            Acitivities:     - Up in a chair each morning as tolerated   - Ambulate with assistance to bathroom   - Scheduled walks once each shift (every 8 hours)      LABS:  Per facility protocol     CMP, CBC each month for 3 months   PT-INR each week for 1 month then monthly   Pre-albumin each month for 3 months   Tegretol level in 1 month and every 3 months     Nursing Precautions:    - Aspiration precautions:             - Total assistance with meals            -  Upright 90 degrees befor during and after meals             -  Suction at bedside          - Fall precautions per nursing home protocol   - Seizure precaution per long-term protocol   - Decubitus precautions:        -  for positioning   - Pressure reducing foam mattress   - Turn patient every two hours. Use wedge pillows to anchor patient      MISCELLANEOUS CARE:               Colostomy Care:  Empty bag every shift and prn                                             Change and clean site every 48 hours        Routine Skin for Bedridden Patients:  Apply moisture barrier cream to all    skin folds and wet areas in perineal area daily and after baths and                           all bowel movements.      Medications: Discontinue all previous medication orders, if any. See new list below.     Annette Garcia   Home Medication Instructions STEFANO:[id number]    Printed on:09/16/20 0659   Medication Information                      acetaminophen (TYLENOL) 325 MG tablet  Take 2 tablets (650 mg total) by mouth every 4 (four) hours as needed for Pain.             apixaban (ELIQUIS) 2.5 mg Tab  Take 1 tablet (2.5 mg total) by mouth 2 (two)  times daily.             calcium-vitamin D 600 mg(1,500mg) -400 unit Tab  Take 1 tablet by mouth once daily.             cefpodoxime (VANTIN) 100 mg/5 mL suspension  Take 10 mLs (200 mg total) by mouth 2 (two) times a day. End date 9/19/20             heparin flush, porcine, (HEPARIN FLUSH 10 UNITS/ML) 10 unit/mL injection  50 Units   flush CVAD with 5ml once monthly for5 maintanence               levothyroxine (SYNTHROID) 125 MCG tablet  TAKE 1 TABLET (125 MCG TOTAL) BY MOUTH ONCE DAILY.                          magnesium oxide (MAG-OX) 400 mg (241.3 mg magnesium) tablet  Take 2 tablets (800 mg total) by mouth once daily.             metroNIDAZOLE (FLAGYL) 500 MG tablet  Take 1 tablet (500 mg total) by mouth every 8 (eight) hours. End date:9/19/20             mirtazapine (REMERON) 7.5 MG Tab  Take 1 tablet (7.5 mg total) by mouth every evening.             ondansetron (ZOFRAN-ODT) 4 MG TbDL  Take 1 tablet (4 mg total) by mouth every 8 (eight) hours as needed.             OXcarbazepine (TRILEPTAL) 300 MG Tab  Take 1 tablet (300 mg total) by mouth nightly.             PHENobarbitaL (LUMINAL) 97.2 MG tablet  Take 1 tablet (97.2 mg total) by mouth every evening.             polyethylene glycol (GLYCOLAX) 17 gram PwPk  Take 17 g by mouth 2 (two) times daily as needed (Constipation).             pravastatin (PRAVACHOL) 20 MG tablet  Take 1 tablet (20 mg total) by mouth once daily.             vitamin D (VITAMIN D3) 1000 units Tab  Take 1,000 Units by mouth once daily.                       _________________________________  Tyler Rocha MD  09/16/2020   yes

## 2020-09-15 NOTE — PLAN OF CARE
Problem: Fall Injury Risk  Goal: Absence of Fall and Fall-Related Injury  Outcome: Ongoing, Progressing     Problem: Adult Inpatient Plan of Care  Goal: Plan of Care Review  Outcome: Ongoing, Progressing  Goal: Patient-Specific Goal (Individualization)  Outcome: Ongoing, Progressing  Goal: Absence of Hospital-Acquired Illness or Injury  Outcome: Ongoing, Progressing  Goal: Optimal Comfort and Wellbeing  Outcome: Ongoing, Progressing  Goal: Readiness for Transition of Care  Outcome: Ongoing, Progressing  Goal: Rounds/Family Conference  Outcome: Ongoing, Progressing     Problem: Wound  Goal: Optimal Wound Healing  Outcome: Ongoing, Progressing     Problem: Skin Injury Risk Increased  Goal: Skin Health and Integrity  Outcome: Ongoing, Progressing     Problem: Infection (Bowel Disease, Inflammatory)  Goal: Absence of Infection Signs/Symptoms  Outcome: Ongoing, Progressing     Problem: Nutrition Impaired (Bowel Disease, Inflammatory)  Goal: Optimal Nutrition  Outcome: Ongoing, Progressing     Problem: Pain (Bowel Disease, Inflammatory)  Goal: Acceptable Pain Control  Outcome: Ongoing, Progressing     Problem: Nausea and Vomiting  Goal: Fluid and Electrolyte Balance  Outcome: Ongoing, Progressing     Problem: Hypertension Comorbidity  Goal: Blood Pressure in Desired Range  Outcome: Ongoing, Progressing     Problem: Seizure Disorder Comorbidity  Goal: Maintenance of Seizure Control  Outcome: Ongoing, Progressing

## 2020-09-16 VITALS
TEMPERATURE: 97 F | DIASTOLIC BLOOD PRESSURE: 75 MMHG | WEIGHT: 142.44 LBS | HEIGHT: 57 IN | BODY MASS INDEX: 30.73 KG/M2 | RESPIRATION RATE: 18 BRPM | HEART RATE: 76 BPM | SYSTOLIC BLOOD PRESSURE: 180 MMHG | OXYGEN SATURATION: 96 %

## 2020-09-16 LAB
BASOPHILS # BLD AUTO: 0.01 K/UL (ref 0–0.2)
BASOPHILS NFR BLD: 0.2 % (ref 0–1.9)
DIFFERENTIAL METHOD: ABNORMAL
EOSINOPHIL # BLD AUTO: 0.1 K/UL (ref 0–0.5)
EOSINOPHIL NFR BLD: 1.8 % (ref 0–8)
ERYTHROCYTE [DISTWIDTH] IN BLOOD BY AUTOMATED COUNT: 12.6 % (ref 11.5–14.5)
HCT VFR BLD AUTO: 25.8 % (ref 37–48.5)
HGB BLD-MCNC: 8.6 G/DL (ref 12–16)
IMM GRANULOCYTES # BLD AUTO: 0.03 K/UL (ref 0–0.04)
IMM GRANULOCYTES NFR BLD AUTO: 0.5 % (ref 0–0.5)
LYMPHOCYTES # BLD AUTO: 1.2 K/UL (ref 1–4.8)
LYMPHOCYTES NFR BLD: 17.5 % (ref 18–48)
MAGNESIUM SERPL-MCNC: 1.4 MG/DL (ref 1.6–2.6)
MCH RBC QN AUTO: 33.2 PG (ref 27–31)
MCHC RBC AUTO-ENTMCNC: 33.3 G/DL (ref 32–36)
MCV RBC AUTO: 100 FL (ref 82–98)
MONOCYTES # BLD AUTO: 0.5 K/UL (ref 0.3–1)
MONOCYTES NFR BLD: 8.1 % (ref 4–15)
NEUTROPHILS # BLD AUTO: 4.7 K/UL (ref 1.8–7.7)
NEUTROPHILS NFR BLD: 71.9 % (ref 38–73)
NRBC BLD-RTO: 0 /100 WBC
PHOSPHATE SERPL-MCNC: 3 MG/DL (ref 2.7–4.5)
PLATELET # BLD AUTO: 135 K/UL (ref 150–350)
PMV BLD AUTO: 10.3 FL (ref 9.2–12.9)
RBC # BLD AUTO: 2.59 M/UL (ref 4–5.4)
WBC # BLD AUTO: 6.57 K/UL (ref 3.9–12.7)

## 2020-09-16 PROCEDURE — 25000003 PHARM REV CODE 250: Performed by: NURSE PRACTITIONER

## 2020-09-16 PROCEDURE — 36415 COLL VENOUS BLD VENIPUNCTURE: CPT

## 2020-09-16 PROCEDURE — 85025 COMPLETE CBC W/AUTO DIFF WBC: CPT

## 2020-09-16 PROCEDURE — 63600175 PHARM REV CODE 636 W HCPCS: Performed by: HOSPITALIST

## 2020-09-16 PROCEDURE — 83735 ASSAY OF MAGNESIUM: CPT

## 2020-09-16 PROCEDURE — 94761 N-INVAS EAR/PLS OXIMETRY MLT: CPT

## 2020-09-16 PROCEDURE — 84100 ASSAY OF PHOSPHORUS: CPT

## 2020-09-16 PROCEDURE — 25000003 PHARM REV CODE 250: Performed by: HOSPITALIST

## 2020-09-16 RX ORDER — LISINOPRIL 5 MG/1
10 TABLET ORAL DAILY
Status: DISCONTINUED | OUTPATIENT
Start: 2020-09-16 | End: 2020-09-16

## 2020-09-16 RX ORDER — LANOLIN ALCOHOL/MO/W.PET/CERES
400 CREAM (GRAM) TOPICAL DAILY
Status: DISCONTINUED | OUTPATIENT
Start: 2020-09-17 | End: 2020-09-16 | Stop reason: HOSPADM

## 2020-09-16 RX ORDER — LISINOPRIL 10 MG/1
10 TABLET ORAL DAILY
Qty: 90 TABLET | Refills: 3
Start: 2020-09-16 | End: 2020-09-16 | Stop reason: HOSPADM

## 2020-09-16 RX ORDER — MAGNESIUM SULFATE HEPTAHYDRATE 40 MG/ML
2 INJECTION, SOLUTION INTRAVENOUS ONCE
Status: COMPLETED | OUTPATIENT
Start: 2020-09-16 | End: 2020-09-16

## 2020-09-16 RX ADMIN — CEFEPIME HYDROCHLORIDE 2 G: 2 INJECTION, SOLUTION INTRAVENOUS at 12:09

## 2020-09-16 RX ADMIN — METRONIDAZOLE 500 MG: 500 TABLET ORAL at 05:09

## 2020-09-16 RX ADMIN — MAGNESIUM SULFATE IN WATER 2 G: 40 INJECTION, SOLUTION INTRAVENOUS at 08:09

## 2020-09-16 RX ADMIN — LEVOTHYROXINE SODIUM 125 MCG: 75 TABLET ORAL at 08:09

## 2020-09-16 RX ADMIN — PRAVASTATIN SODIUM 20 MG: 10 TABLET ORAL at 08:09

## 2020-09-16 RX ADMIN — APIXABAN 5 MG: 5 TABLET, FILM COATED ORAL at 08:09

## 2020-09-16 RX ADMIN — METRONIDAZOLE 500 MG: 500 TABLET ORAL at 01:09

## 2020-09-16 RX ADMIN — CEFEPIME HYDROCHLORIDE 2 G: 2 INJECTION, SOLUTION INTRAVENOUS at 05:09

## 2020-09-16 NOTE — HOSPITAL COURSE
"Ms. Garcia presented with hypotension, back pain, nausea, and vomiting. Found to have colitis on CT of abdomen/pelvis. She was fluid resuscitated and initiated on cefepime/flagyl, which were de-escalated to cefpodoxime and flagyl. Of note, she has documented PCN allergy, but has tolerated multiple cephalosporins in the past. Will arrange for outpatient allergy testing given many drug allergies. Have held lisinopril, which was actually supposed to be discontinued on last admission due to hypotensive issues. Can monitor BP in outpatient setting and if persistently elevated, could consider resumption at lower dose. Problem based discussion below.    BP (!) 180/75   Pulse 76   Temp 97 °F (36.1 °C) (Axillary)   Resp 18   Ht 4' 9" (1.448 m)   Wt 64.6 kg (142 lb 6.7 oz)   LMP  (LMP Unknown)   SpO2 96%   Breastfeeding No   BMI 30.82 kg/m²   Physical Exam  Constitutional:       Appearance: Normal appearance.   HENT:      Head: Normocephalic and atraumatic.      Nose: No congestion or rhinorrhea.      Mouth/Throat:      Mouth: Mucous membranes are moist.   Eyes:      Extraocular Movements: Extraocular movements intact.      Pupils: Pupils are equal, round, and reactive to light.   Neck:      Musculoskeletal: Normal range of motion and neck supple.   Cardiovascular:      Rate and Rhythm: Normal rate.   Pulmonary:      Effort: No respiratory distress.      Breath sounds: Normal breath sounds.   Abdominal:      General: Bowel sounds are normal.      Comments: Ostomy to left abdomen, site is pink and well perfused.    Musculoskeletal:         General: No tenderness or deformity.   Skin:     General: Skin is warm.   Neurological:      General: No focal deficit present.      Mental Status: She is alert and oriented to person, place, and time.   Psychiatric:         Mood and Affect: Mood normal.   "

## 2020-09-16 NOTE — PLAN OF CARE
Kandi from [facility] called and says the orders are good.  She asked that I set up transportation through [facility] through them at 15:00.  They are trying to get the rest of their pts back from evacuation at this time.         09/16/20 1130   Post-Acute Status   Post-Acute Authorization Placement  ([facility])     Phil Vallejo RN, CM  [phone]

## 2020-09-16 NOTE — PLAN OF CARE
[facility] wheelchair van will be here at 16:00 to transport pt back to [facility].  I let pt know and I called her daughter Beatris [phone] to let her know as well.  Beatris confirmed that pt travels via wheelchair van.  Packet given to Shoshana, bedside nurse.    Rounds completed on pt.  All questions addressed.  Bedside nurse to discuss d/c medications.  Discussed importance to attend all f/u appts and take medications as prescribed.  Verbalized understanding.    Future Appointments   Date Time Provider Department Center   10/8/2020 10:20 AM Timmy Simmons MD [facility] [place]        09/16/20 1250   Final Note   Assessment Type Final Discharge Note   Anticipated Discharge Disposition group home [facility] Follow Up  Appt(s) scheduled? No   Discharge plans and expectations educations in teach back method with documentation complete? Yes   Right Care Referral Info   Post Acute Recommendation Other   Post-Acute Status   Post-Acute Authorization Placement   Post-Acute Placement Status Set-up Complete     Phil Vallejo RN,   [phone]     98.5

## 2020-09-16 NOTE — PLAN OF CARE
Updated facility transfer orders sent to [facility] via [facility].  Waiting on response.       09/16/20 1118   Post-Acute Status   Post-Acute Authorization Placement  ([facility])     Phil Vallejo RN,   [phone]

## 2020-09-16 NOTE — PLAN OF CARE
09/16/20 0911   PRE-TX-O2   O2 Device (Oxygen Therapy) room air   SpO2 (!) 92 %   Pulse Oximetry Type Intermittent   $ Pulse Oximetry - Multiple Charge Pulse Oximetry - Multiple

## 2020-09-16 NOTE — PLAN OF CARE
Updated facility transfer orders sent via [facility].  I spoke with Kandi at [facility] and she is reviewing the orders and will let me know transport time once reviewed.         09/16/20 0904   Post-Acute Status   Post-Acute Authorization Placement  (Return to [facility])   Post-Acute Placement Status Referrals Sent     Phil Vallejo RN, CM  [phone]

## 2020-09-16 NOTE — ASSESSMENT & PLAN NOTE
CT concerning for colitis  Initiated on cefepime and flagyl, transition to oral for discharge  IVFs on admission, will hold now since tolerating diet  Advanced diet with success  Prn antiemetics
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
## 2020-09-16 NOTE — PLAN OF CARE
1910H- Received patient upon rounds, patient seen in bed, lying on semi-barrios's position.   Patient conscious , coherent to time, place date, and situation. GCS-15. Very pleasant old lady. With saline lock on the left forearm gauge 22 , flushed patent, with clean and dry dressing. Patient has no subjective complaint of pain upon rounds.      Stable VS, afebrile. Bilateral leg with glass skin, with (+3) non-pitting edema. Colostomy bag in place, draining clear with  yellow to brown stool. Buttocks cheek area with stage 2 bed sore, wound care rendered. Pure wick  in place draining clear yellow urine. Seizure precautions observed.Telemetry Normal sinus rhythm  (90's). Oxygen saturation 94% on room air.      Advised patient to call for any assistance. Safety fall precaution measures noted. Bed alarm on. Call bell with in reach. VN model explained. Will continue to monitor patient.    0539H- Patient stable VS over the night, afebrile. No untoward signs and symptoms noted over the night. No complaints of nausea nor vomiting. Slept well.  Telemetry no ectopy. Free from fall. IV line patent.Pure wick replaced. Will endorse patient to day shift Nurse.

## 2020-09-16 NOTE — ASSESSMENT & PLAN NOTE
Hypotensive on admission  Hold antihypertensives  - possibly due to infection vs GI losses from nausea  - can consider resumption of lisinopril at low dose as outpatient if pressures persistently elevated (had isolated high pressure while here but has generally been normotensive)   sent by Md

## 2020-09-18 ENCOUNTER — EXTERNAL HOSPITAL ADMISSION (OUTPATIENT)
Dept: SKILLED NURSING FACILITY | Facility: HOSPITAL | Age: 85
End: 2020-09-18
Payer: MEDICARE

## 2020-09-18 DIAGNOSIS — K52.9 COLITIS: Primary | ICD-10-CM

## 2020-09-19 LAB
BACTERIA BLD CULT: NORMAL
BACTERIA BLD CULT: NORMAL

## 2020-09-19 NOTE — PROGRESS NOTES
[facility]   Recent Hospital Discharge  DOS 9/18/2020     PRESENTING HISTORY     Chief Complaint/Reason for Admission:  Follow up Hospital Discharge   Admission Date: 9/13/2020  Hospital Length of Stay: 2 days  Discharge Date and Time:  09/16/2020 5:05 PM      History of Present Illness:  Ms. Annette Garcia is a 85 y.o. female who presented to ED  ED due to N/V. CT of abdomen concerning for colitis from the distal transverse colon to the ostomy.  She was fluid resuscitated and initiated on cefepime/flagyl, which were de-escalated to cefpodoxime and flagyl. Of note, she has documented PCN allergy, but has tolerated multiple cephalosporins in the past.   She is currently sitting up in the wheelchair with no acute distress. Working with PT/OT for ROM and muscle strengthening exercises. AAOx3. No acute distress. No complaints. LCTAB with trace edema to BLE. Colostomy intact. VSS. Afebrile.     Review of Systems  General ROS: negative for chills, fever or weight loss  Psychological ROS: negative for hallucination, depression or suicidal ideation  Ophthalmic ROS: negative for blurry vision, photophobia or eye pain  ENT ROS: negative for epistaxis, sore throat or rhinorrhea  Respiratory ROS: no cough, shortness of breath, or wheezing  Cardiovascular ROS: no chest pain or dyspnea on exertion; + leg swelling  Gastrointestinal ROS: no abdominal pain, change in bowel habits, or black/ bloody stools  Genito-Urinary ROS: no dysuria, trouble voiding, or hematuria  Musculoskeletal ROS: + muscular weakness  Neurological ROS: no syncope or seizures; no ataxia  Dermatological ROS: negative for pruritis, rash and jaundice       PAST HISTORY:     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    In the setting of septic shock and  near death    Petit mal epilepsy 1954    Scoliosis of lumbar spine     Seizures     Unspecified hypothyroidism        Past Surgical History:   Procedure Laterality Date    APPENDECTOMY      BACK SURGERY  1988    vertebral fracture    BACK SURGERY  02/2013    lumbar L2-5    CATARACT EXTRACTION, BILATERAL Bilateral     CHOLECYSTECTOMY      open    EYE SURGERY Bilateral     cataract removal with lens implant    HYSTERECTOMY      PERCUTANEOUS TRANSLUMINAL ANGIOPLASTY (PTA) OF PERIPHERAL VESSEL N/A 2/3/2020    Procedure: PTA, PERIPHERAL VESSEL;  Surgeon: Timmy Simmons MD;  Location: [facility] CATH LAB/EP;  Service: Cardiology;  Laterality: N/A;    PORTACATH PLACEMENT Right 09/2016    RENAL ARTERY STENT Left 07/19/2017    SIGMOIDECTOMY  10/29/2019    SMALL INTESTINE SURGERY  08/23/2016    THROMBECTOMY N/A 2/3/2020    Procedure: THROMBECTOMY;  Surgeon: Timmy Simmons MD;  Location: [facility] CATH LAB/EP;  Service: Cardiology;  Laterality: N/A;    TONSILLECTOMY      VAGINAL HYSTERECTOMY W/ ANTERIOR AND POSTERIOR VAGINAL REPAIR         Family History   Problem Relation Age of Onset    Pancreatic cancer Mother     Hypertension Father     Heart attack Father          MEDICATIONS & ALLERGIES:     Current Outpatient Medications on File Prior to Visit   Medication Sig Dispense Refill    acetaminophen (TYLENOL) 325 MG tablet Take 2 tablets (650 mg total) by mouth every 4 (four) hours as needed for Pain.  0    apixaban (ELIQUIS) 2.5 mg Tab Take 1 tablet (2.5 mg total) by mouth 2 (two) times daily.      calcium-vitamin D 600 mg(1,500mg) -400 unit Tab Take 1 tablet by mouth once daily.      cefpodoxime (VANTIN) 100 mg/5 mL suspension Take 10 mLs (200 mg total) by mouth 2 (two) times a day. End date 9/19/20 200 mL 0    heparin flush, porcine, (HEPARIN FLUSH 10 UNITS/ML) 10 unit/mL injection 50 Units.      levothyroxine (SYNTHROID) 125 MCG tablet TAKE 1 TABLET (125 MCG TOTAL) BY MOUTH ONCE DAILY. 90 tablet 3     magnesium oxide (MAG-OX) 400 mg (241.3 mg magnesium) tablet Take 2 tablets (800 mg total) by mouth once daily.  0    metroNIDAZOLE (FLAGYL) 500 MG tablet Take 1 tablet (500 mg total) by mouth every 8 (eight) hours. End date:9/19/20      mirtazapine (REMERON) 7.5 MG Tab Take 1 tablet (7.5 mg total) by mouth every evening. 30 tablet 11    ondansetron (ZOFRAN-ODT) 4 MG TbDL Take 1 tablet (4 mg total) by mouth every 8 (eight) hours as needed.      OXcarbazepine (TRILEPTAL) 300 MG Tab Take 1 tablet (300 mg total) by mouth nightly. 90 tablet 3    PHENobarbitaL (LUMINAL) 97.2 MG tablet Take 1 tablet (97.2 mg total) by mouth every evening. 30 tablet 5    polyethylene glycol (GLYCOLAX) 17 gram PwPk Take 17 g by mouth 2 (two) times daily as needed (Constipation).  0    pravastatin (PRAVACHOL) 20 MG tablet Take 1 tablet (20 mg total) by mouth once daily. 90 tablet 3    vitamin D (VITAMIN D3) 1000 units Tab Take 1,000 Units by mouth once daily.       No current facility-administered medications on file prior to visit.         Review of patient's allergies indicates:   Allergen Reactions    Adhesive Itching and Blisters    Penicillins Anaphylaxis    Tramadol Hives    Avelox [moxifloxacin] Rash     Facial and arm itching and redness. Pt states throat closes when given.    Amoxil [amoxicillin]     Aspridrox [aspirin, buffered]     Codeine Other (See Comments)     Throat swelling    Keflex [cephalexin]      Tolerated cefepime and cefazolin    Norvasc [amlodipine]     Red dye Hives    Robitussin [guaifenesin]     Sulfa (sulfonamide antibiotics)     Tylenol [acetaminophen]      Has reaction to Tylenol with red dye and unable to take Extra Strength Tylenol/ CAN ONLY TOLERATE REG STRENGTH TYLENOL    Vicks vaporub [camphor-eucalyptus oil-menthol]        OBJECTIVE:     Vital Signs:  LMP  (LMP Unknown)   Wt Readings from Last 1 Encounters:   09/14/20 1600 64.6 kg (142 lb 6.7 oz)   09/14/20 0130 64.6 kg (142 lb  6.7 oz)   09/13/20 1432 60.3 kg (133 lb)     There is no height or weight on file to calculate BMI.        Physical Exam:  General appearance: alert, cooperative, no distress  Constitutional:Oriented to person, place, and time  + appears well-developed and well-nourished.   HEENT: Normocephalic, atraumatic,  Eyes: conjunctivae/corneas clear, PERRL  Lungs: clear to auscultation bilaterally  Heart: regular rate and rhythm   Abdomen: soft, non-tender; bowel sounds normoactive: +colostomy   Extremities: extremities symmetric; + trace edema to BLE  Integument: Skin warm and dry to touch  Neurologic: Alert and oriented X 4  Psychiatric: no pressured speech; normal affect; no evidence of impaired cognition     Laboratory  Lab Results   Component Value Date    WBC 6.57 09/16/2020    HGB 8.6 (L) 09/16/2020    HCT 25.8 (L) 09/16/2020     (H) 09/16/2020     (L) 09/16/2020     BMP  Lab Results   Component Value Date     09/15/2020    K 4.1 09/15/2020     09/15/2020    CO2 25 09/15/2020    BUN 13 09/15/2020    CREATININE 0.7 09/15/2020    CALCIUM 8.7 09/15/2020    ANIONGAP 6 (L) 09/15/2020    ESTGFRAFRICA >60 09/15/2020    EGFRNONAA >60 09/15/2020     Lab Results   Component Value Date    ALT 16 09/14/2020    AST 18 09/14/2020    ALKPHOS 73 09/14/2020    BILITOT 0.2 09/14/2020     Lab Results   Component Value Date    INR 1.0 01/31/2020    INR 1.0 01/31/2020    INR 0.9 01/30/2020       ASSESSMENT & PLAN:     Colitis  CT concerning for colitis  - on cefpodoxime 100 mg BID and Flagyl 500 mg Q8H  until 9/19  Prn antiemetics    Chronic deep vein thrombosis (DVT) of femoral vein of left lower extremity   - continue on Eliquis 2.5 mg BID.  - follow up with Dr. Simmons     Syncope  - -30 day event monitor  - follow up with Dr. Simmons, cardiology     Paroxysmal atrial fibrillation  DVT of deep femoral vein, left   Continue Eliquis- taking 2.5 mg bid           Slow transit constipation  Colostomy  Ostomy in  left abdomen. Site is clean. Non distended and non tender.    Continue miralax  Ostomy Care        Old MI (myocardial infarction)  Coronary Artery Disease   Continue Statin        Acquired hypothyroidism  Continue Levothyroxine 125 mcg daily        HTN  - lisinopril and clonidine d/c due to hypotension        Epilepsy  Seizure precautions  Continue trileptal and luminal       Scheduled Follow-up :  Future Appointments   Date Time Provider Department Center   10/8/2020 10:20 AM Timmy Simmons MD [facility] CARDIO [place]       Post Visit Medication List:     Medication List          Accurate as of September 18, 2020 11:59 PM. If you have any questions, ask your nurse or doctor.            CONTINUE taking these medications    acetaminophen 325 MG tablet  Commonly known as: TYLENOL  Take 2 tablets (650 mg total) by mouth every 4 (four) hours as needed for Pain.     apixaban 2.5 mg Tab  Commonly known as: ELIQUIS  Take 1 tablet (2.5 mg total) by mouth 2 (two) times daily.     calcium-vitamin D 600 mg(1,500mg) -400 unit Tab     cefpodoxime 100 mg/5 mL suspension  Commonly known as: VANTIN  Take 10 mLs (200 mg total) by mouth 2 (two) times a day. End date 9/19/20     heparin flush (porcine) 10 unit/mL injection  Commonly known as: heparin flush 10 units/mL     levothyroxine 125 MCG tablet  Commonly known as: SYNTHROID  TAKE 1 TABLET (125 MCG TOTAL) BY MOUTH ONCE DAILY.     magnesium oxide 400 mg (241.3 mg magnesium) tablet  Commonly known as: MAG-OX  Take 2 tablets (800 mg total) by mouth once daily.     metroNIDAZOLE 500 MG tablet  Commonly known as: FLAGYL  Take 1 tablet (500 mg total) by mouth every 8 (eight) hours. End date:9/19/20     mirtazapine 7.5 MG Tab  Commonly known as: REMERON  Take 1 tablet (7.5 mg total) by mouth every evening.     ondansetron 4 MG Tbdl  Commonly known as: ZOFRAN-ODT  Take 1 tablet (4 mg total) by mouth every 8 (eight) hours as needed.     OXcarbazepine 300 MG Tab  Commonly known as:  TRILEPTAL  Take 1 tablet (300 mg total) by mouth nightly.     PHENobarbitaL 97.2 MG tablet  Commonly known as: LUMINAL  Take 1 tablet (97.2 mg total) by mouth every evening.     polyethylene glycol 17 gram Pwpk  Commonly known as: GLYCOLAX  Take 17 g by mouth 2 (two) times daily as needed (Constipation).     pravastatin 20 MG tablet  Commonly known as: PRAVACHOL  Take 1 tablet (20 mg total) by mouth once daily.     vitamin D 1000 units Tab  Commonly known as: VITAMIN D3            Signing Physician:  Pavithra Warner NP

## 2020-09-29 ENCOUNTER — PATIENT MESSAGE (OUTPATIENT)
Dept: OTHER | Facility: OTHER | Age: 85
End: 2020-09-29

## 2020-11-16 ENCOUNTER — EXTERNAL HOSPITAL ADMISSION (OUTPATIENT)
Dept: SKILLED NURSING FACILITY | Facility: HOSPITAL | Age: 85
End: 2020-11-16
Payer: MEDICARE

## 2020-11-16 DIAGNOSIS — R55 SYNCOPE, UNSPECIFIED SYNCOPE TYPE: Primary | ICD-10-CM

## 2020-11-16 NOTE — PROGRESS NOTES
[facility]   Re-evaluation Visit  DOS 11/16/2020     PRESENTING HISTORY     Chief Complaint/Reason for Admission:  Routine follow up and evaluate chronic diseases    History of Present Illness:  Ms. Annette Garcia is a 85 y.o. female who currently sitting up in the wheelchair with no acute distress. AAOx3. No acute distress. No complaints. Denies syncope, CP, SOB, cough, pain, chills, body aches. LCTAB with no edema. Colostomy intact. VSS. Afebrile.     Review of Systems  General ROS: negative for chills, fever or weight loss  Psychological ROS: negative for hallucination, depression or suicidal ideation  Ophthalmic ROS: negative for blurry vision, photophobia or eye pain  ENT ROS: negative for epistaxis, sore throat or rhinorrhea  Respiratory ROS: no cough, shortness of breath, or wheezing  Cardiovascular ROS: no chest pain or dyspnea on exertion  Gastrointestinal ROS: no abdominal pain, change in bowel habits, or black/ bloody stools; + colostomy   Genito-Urinary ROS: no dysuria, trouble voiding, or hematuria  Musculoskeletal ROS: + muscular weakness  Neurological ROS: no syncope or seizures; no ataxia  Dermatological ROS: negative for pruritis, rash and jaundice       PAST HISTORY:     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    In the setting of septic shock and near death    Petit mal epilepsy 1954    Scoliosis of lumbar spine     Seizures     Unspecified hypothyroidism        Past Surgical History:   Procedure Laterality Date    APPENDECTOMY      BACK SURGERY  1988    vertebral fracture    BACK SURGERY  02/2013    lumbar L2-5    CATARACT EXTRACTION, BILATERAL Bilateral     CHOLECYSTECTOMY      open    EYE SURGERY Bilateral     cataract removal with lens implant    HYSTERECTOMY       PERCUTANEOUS TRANSLUMINAL ANGIOPLASTY (PTA) OF PERIPHERAL VESSEL N/A 2/3/2020    Procedure: PTA, PERIPHERAL VESSEL;  Surgeon: Timmy Simmons MD;  Location: [facility] CATH LAB/EP;  Service: Cardiology;  Laterality: N/A;    PORTACATH PLACEMENT Right 09/2016    RENAL ARTERY STENT Left 07/19/2017    SIGMOIDECTOMY  10/29/2019    SMALL INTESTINE SURGERY  08/23/2016    THROMBECTOMY N/A 2/3/2020    Procedure: THROMBECTOMY;  Surgeon: Timmy Simmons MD;  Location: [facility] CATH LAB/EP;  Service: Cardiology;  Laterality: N/A;    TONSILLECTOMY      VAGINAL HYSTERECTOMY W/ ANTERIOR AND POSTERIOR VAGINAL REPAIR         Family History   Problem Relation Age of Onset    Pancreatic cancer Mother     Hypertension Father     Heart attack Father          MEDICATIONS & ALLERGIES:     Current Outpatient Medications on File Prior to Visit   Medication Sig Dispense Refill    acetaminophen (TYLENOL) 325 MG tablet Take 2 tablets (650 mg total) by mouth every 4 (four) hours as needed for Pain.  0    apixaban (ELIQUIS) 2.5 mg Tab Take 1 tablet (2.5 mg total) by mouth 2 (two) times daily.      calcium-vitamin D 600 mg(1,500mg) -400 unit Tab Take 1 tablet by mouth once daily.      cefpodoxime (VANTIN) 100 mg/5 mL suspension Take 10 mLs (200 mg total) by mouth 2 (two) times a day. End date 9/19/20 200 mL 0    heparin flush, porcine, (HEPARIN FLUSH 10 UNITS/ML) 10 unit/mL injection 50 Units.      levothyroxine (SYNTHROID) 125 MCG tablet TAKE 1 TABLET (125 MCG TOTAL) BY MOUTH ONCE DAILY. 90 tablet 3    magnesium oxide (MAG-OX) 400 mg (241.3 mg magnesium) tablet Take 2 tablets (800 mg total) by mouth once daily.  0    metroNIDAZOLE (FLAGYL) 500 MG tablet Take 1 tablet (500 mg total) by mouth every 8 (eight) hours. End date:9/19/20      mirtazapine (REMERON) 7.5 MG Tab Take 1 tablet (7.5 mg total) by mouth every evening. 30 tablet 11    ondansetron (ZOFRAN-ODT) 4 MG TbDL Take 1 tablet (4 mg total) by mouth every 8 (eight) hours as  needed.      OXcarbazepine (TRILEPTAL) 300 MG Tab Take 1 tablet (300 mg total) by mouth nightly. 90 tablet 3    PHENobarbitaL (LUMINAL) 97.2 MG tablet Take 1 tablet (97.2 mg total) by mouth every evening. 30 tablet 5    polyethylene glycol (GLYCOLAX) 17 gram PwPk Take 17 g by mouth 2 (two) times daily as needed (Constipation).  0    pravastatin (PRAVACHOL) 20 MG tablet Take 1 tablet (20 mg total) by mouth once daily. 90 tablet 3    vitamin D (VITAMIN D3) 1000 units Tab Take 1,000 Units by mouth once daily.       No current facility-administered medications on file prior to visit.         Review of patient's allergies indicates:   Allergen Reactions    Adhesive Itching and Blisters    Penicillins Anaphylaxis    Tramadol Hives    Avelox [moxifloxacin] Rash     Facial and arm itching and redness. Pt states throat closes when given.    Amoxil [amoxicillin]     Aspridrox [aspirin, buffered]     Codeine Other (See Comments)     Throat swelling    Keflex [cephalexin]      Tolerated cefepime and cefazolin    Norvasc [amlodipine]     Red dye Hives    Robitussin [guaifenesin]     Sulfa (sulfonamide antibiotics)     Tylenol [acetaminophen]      Has reaction to Tylenol with red dye and unable to take Extra Strength Tylenol/ CAN ONLY TOLERATE REG STRENGTH TYLENOL    Vicks vaporub [camphor-eucalyptus oil-menthol]        OBJECTIVE:     Physical Exam:  General appearance: alert, cooperative, no distress  Constitutional:Oriented to person, place, and time  + appears well-developed and well-nourished.   HEENT: Normocephalic, atraumatic,  Eyes: conjunctivae/corneas clear, PERRL  Lungs: clear to auscultation bilaterally  Heart: regular rate and rhythm   Abdomen: soft, non-tender; bowel sounds normoactive: +colostomy with soft stool  Extremities: extremities symmetric, no edema   Integument: Skin warm and dry to touch  Neurologic: Alert and oriented X 4  Psychiatric: no pressured speech; normal affect; no evidence of  impaired cognition       ASSESSMENT & PLAN:     Chronic deep vein thrombosis (DVT) of femoral vein of left lower extremity   - continue on Eliquis 2.5 mg BID.  - follow up with Dr. Simmons     Syncope  - follow up with Dr. Simmons, cardiology     Paroxysmal atrial fibrillation  DVT of deep femoral vein, left   Continue Eliquis- taking 2.5 mg bid        Slow transit constipation  Colostomy  Ostomy in left abdomen.   Ostomy Care        Old MI (myocardial infarction)  Coronary Artery Disease   Continue Statin        Acquired hypothyroidism  Continue Levothyroxine 125 mcg daily        HTN  - lisinopril and clonidine d/c due to hypotension        Epilepsy  Continue trileptal and luminal       Scheduled Follow-up :  No future appointments.    Post Visit Medication List:     Medication List          Accurate as of November 16, 2020 12:59 PM. If you have any questions, ask your nurse or doctor.            CONTINUE taking these medications    acetaminophen 325 MG tablet  Commonly known as: TYLENOL  Take 2 tablets (650 mg total) by mouth every 4 (four) hours as needed for Pain.     apixaban 2.5 mg Tab  Commonly known as: ELIQUIS  Take 1 tablet (2.5 mg total) by mouth 2 (two) times daily.     calcium-vitamin D 600 mg(1,500mg) -400 unit Tab     cefpodoxime 100 mg/5 mL suspension  Commonly known as: VANTIN  Take 10 mLs (200 mg total) by mouth 2 (two) times a day. End date 9/19/20     heparin flush (porcine) 10 unit/mL injection  Commonly known as: heparin flush 10 units/mL     levothyroxine 125 MCG tablet  Commonly known as: SYNTHROID  TAKE 1 TABLET (125 MCG TOTAL) BY MOUTH ONCE DAILY.     magnesium oxide 400 mg (241.3 mg magnesium) tablet  Commonly known as: MAG-OX  Take 2 tablets (800 mg total) by mouth once daily.     metroNIDAZOLE 500 MG tablet  Commonly known as: FLAGYL  Take 1 tablet (500 mg total) by mouth every 8 (eight) hours. End date:9/19/20     mirtazapine 7.5 MG Tab  Commonly known as: REMERON  Take 1 tablet (7.5 mg  total) by mouth every evening.     ondansetron 4 MG Tbdl  Commonly known as: ZOFRAN-ODT  Take 1 tablet (4 mg total) by mouth every 8 (eight) hours as needed.     OXcarbazepine 300 MG Tab  Commonly known as: TRILEPTAL  Take 1 tablet (300 mg total) by mouth nightly.     PHENobarbitaL 97.2 MG tablet  Commonly known as: LUMINAL  Take 1 tablet (97.2 mg total) by mouth every evening.     polyethylene glycol 17 gram Pwpk  Commonly known as: GLYCOLAX  Take 17 g by mouth 2 (two) times daily as needed (Constipation).     pravastatin 20 MG tablet  Commonly known as: PRAVACHOL  Take 1 tablet (20 mg total) by mouth once daily.     vitamin D 1000 units Tab  Commonly known as: VITAMIN D3            Signing Physician:  Pavithra Warner NP

## 2020-12-07 ENCOUNTER — PATIENT MESSAGE (OUTPATIENT)
Dept: CARDIOLOGY | Facility: CLINIC | Age: 85
End: 2020-12-07

## 2020-12-11 ENCOUNTER — PATIENT MESSAGE (OUTPATIENT)
Dept: OTHER | Facility: OTHER | Age: 85
End: 2020-12-11

## 2021-01-07 ENCOUNTER — EXTERNAL HOSPITAL ADMISSION (OUTPATIENT)
Dept: SKILLED NURSING FACILITY | Facility: HOSPITAL | Age: 86
End: 2021-01-07
Payer: MEDICARE

## 2021-01-07 DIAGNOSIS — I25.10 CORONARY ARTERY DISEASE, ANGINA PRESENCE UNSPECIFIED, UNSPECIFIED VESSEL OR LESION TYPE, UNSPECIFIED WHETHER NATIVE OR TRANSPLANTED HEART: Primary | ICD-10-CM

## 2021-02-07 DIAGNOSIS — G40.209 PARTIAL SYMPTOMATIC EPILEPSY WITH COMPLEX PARTIAL SEIZURES, NOT INTRACTABLE, WITHOUT STATUS EPILEPTICUS: ICD-10-CM

## 2021-02-07 RX ORDER — PHENOBARBITAL 97.2 MG/1
97.2 TABLET ORAL NIGHTLY
Qty: 30 TABLET | Refills: 5 | Status: SHIPPED | OUTPATIENT
Start: 2021-02-07 | End: 2021-04-02 | Stop reason: SDUPTHER

## 2021-02-25 ENCOUNTER — EXTERNAL HOSPITAL ADMISSION (OUTPATIENT)
Dept: SKILLED NURSING FACILITY | Facility: HOSPITAL | Age: 86
End: 2021-02-25
Payer: MEDICARE

## 2021-02-25 DIAGNOSIS — I25.10 CORONARY ARTERY DISEASE, ANGINA PRESENCE UNSPECIFIED, UNSPECIFIED VESSEL OR LESION TYPE, UNSPECIFIED WHETHER NATIVE OR TRANSPLANTED HEART: Primary | ICD-10-CM

## 2021-04-02 DIAGNOSIS — G40.209 PARTIAL SYMPTOMATIC EPILEPSY WITH COMPLEX PARTIAL SEIZURES, NOT INTRACTABLE, WITHOUT STATUS EPILEPTICUS: ICD-10-CM

## 2021-04-02 RX ORDER — PHENOBARBITAL 97.2 MG/1
97.2 TABLET ORAL NIGHTLY
Qty: 30 TABLET | Refills: 5 | Status: SHIPPED | OUTPATIENT
Start: 2021-04-06 | End: 2021-04-29 | Stop reason: SDUPTHER

## 2021-04-29 DIAGNOSIS — G40.209 PARTIAL SYMPTOMATIC EPILEPSY WITH COMPLEX PARTIAL SEIZURES, NOT INTRACTABLE, WITHOUT STATUS EPILEPTICUS: ICD-10-CM

## 2021-04-29 RX ORDER — PHENOBARBITAL 97.2 MG/1
97.2 TABLET ORAL NIGHTLY
Qty: 30 TABLET | Refills: 5 | Status: SHIPPED | OUTPATIENT
Start: 2021-04-29 | End: 2021-05-31 | Stop reason: SDUPTHER

## 2021-05-07 ENCOUNTER — EXTERNAL HOSPITAL ADMISSION (OUTPATIENT)
Dept: SKILLED NURSING FACILITY | Facility: HOSPITAL | Age: 86
End: 2021-05-07
Payer: MEDICARE

## 2021-05-07 DIAGNOSIS — I25.10 CORONARY ARTERY DISEASE, ANGINA PRESENCE UNSPECIFIED, UNSPECIFIED VESSEL OR LESION TYPE, UNSPECIFIED WHETHER NATIVE OR TRANSPLANTED HEART: Primary | ICD-10-CM
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
## 2021-05-31 ENCOUNTER — DOCUMENTATION ONLY (OUTPATIENT)
Dept: PALLIATIVE MEDICINE | Facility: CLINIC | Age: 86
End: 2021-05-31

## 2021-05-31 DIAGNOSIS — G40.209 PARTIAL SYMPTOMATIC EPILEPSY WITH COMPLEX PARTIAL SEIZURES, NOT INTRACTABLE, WITHOUT STATUS EPILEPTICUS: ICD-10-CM

## 2021-05-31 RX ORDER — PHENOBARBITAL 97.2 MG/1
97.2 TABLET ORAL NIGHTLY
Qty: 30 TABLET | Refills: 5 | Status: SHIPPED | OUTPATIENT
Start: 2021-05-31 | End: 2021-09-01

## 2021-07-06 ENCOUNTER — EXTERNAL HOSPITAL ADMISSION (OUTPATIENT)
Dept: SKILLED NURSING FACILITY | Facility: HOSPITAL | Age: 86
End: 2021-07-06
Payer: MEDICARE

## 2021-07-06 DIAGNOSIS — I25.10 CORONARY ARTERY DISEASE, ANGINA PRESENCE UNSPECIFIED, UNSPECIFIED VESSEL OR LESION TYPE, UNSPECIFIED WHETHER NATIVE OR TRANSPLANTED HEART: Primary | ICD-10-CM

## 2021-09-01 RX ORDER — PHENOBARBITAL 97.2 MG/1
97.2 TABLET ORAL NIGHTLY
Qty: 60 TABLET | Refills: 0 | Status: SHIPPED | OUTPATIENT
Start: 2021-09-01 | End: 2021-09-13

## 2021-09-13 RX ORDER — PHENOBARBITAL 97.2 MG/1
97.2 TABLET ORAL 2 TIMES DAILY
Qty: 60 TABLET | Refills: 0 | Status: SHIPPED | OUTPATIENT
Start: 2021-09-13 | End: 2021-11-09

## 2021-10-14 ENCOUNTER — EXTERNAL HOSPITAL ADMISSION (OUTPATIENT)
Dept: SKILLED NURSING FACILITY | Facility: HOSPITAL | Age: 86
End: 2021-10-14
Payer: MEDICARE

## 2021-10-14 DIAGNOSIS — I25.10 CORONARY ARTERY DISEASE, UNSPECIFIED VESSEL OR LESION TYPE, UNSPECIFIED WHETHER ANGINA PRESENT, UNSPECIFIED WHETHER NATIVE OR TRANSPLANTED HEART: Primary | ICD-10-CM

## 2021-11-10 ENCOUNTER — EXTERNAL HOSPITAL ADMISSION (OUTPATIENT)
Dept: SKILLED NURSING FACILITY | Facility: HOSPITAL | Age: 86
End: 2021-11-10
Payer: MEDICARE

## 2021-11-10 DIAGNOSIS — K52.9 COLITIS: Primary | ICD-10-CM

## 2021-11-10 PROCEDURE — 99308 PR NURSING FAC CARE, SUBSEQ, MINOR COMPLIC: ICD-10-PCS | Mod: ,,, | Performed by: INTERNAL MEDICINE

## 2021-11-10 PROCEDURE — 99308 SBSQ NF CARE LOW MDM 20: CPT | Mod: ,,, | Performed by: INTERNAL MEDICINE

## 2021-12-06 ENCOUNTER — EXTERNAL HOSPITAL ADMISSION (OUTPATIENT)
Dept: SKILLED NURSING FACILITY | Facility: HOSPITAL | Age: 86
End: 2021-12-06
Payer: MEDICARE

## 2021-12-06 DIAGNOSIS — K52.9 COLITIS: Primary | ICD-10-CM

## 2021-12-06 PROCEDURE — 99308 PR NURSING FAC CARE, SUBSEQ, MINOR COMPLIC: ICD-10-PCS | Mod: ,,, | Performed by: INTERNAL MEDICINE

## 2021-12-06 PROCEDURE — 99308 SBSQ NF CARE LOW MDM 20: CPT | Mod: ,,, | Performed by: INTERNAL MEDICINE

## 2021-12-14 ENCOUNTER — EXTERNAL HOSPITAL ADMISSION (OUTPATIENT)
Dept: SKILLED NURSING FACILITY | Facility: HOSPITAL | Age: 86
End: 2021-12-14
Payer: MEDICARE

## 2021-12-14 DIAGNOSIS — R55 SYNCOPE, UNSPECIFIED SYNCOPE TYPE: Primary | ICD-10-CM

## 2021-12-22 ENCOUNTER — EXTERNAL HOSPITAL ADMISSION (OUTPATIENT)
Dept: SKILLED NURSING FACILITY | Facility: HOSPITAL | Age: 86
End: 2021-12-22
Payer: MEDICARE

## 2021-12-22 DIAGNOSIS — R55 SYNCOPE, UNSPECIFIED SYNCOPE TYPE: Primary | ICD-10-CM

## 2021-12-22 PROCEDURE — 99308 PR NURSING FAC CARE, SUBSEQ, MINOR COMPLIC: ICD-10-PCS | Mod: ,,, | Performed by: INTERNAL MEDICINE

## 2021-12-22 PROCEDURE — 99308 SBSQ NF CARE LOW MDM 20: CPT | Mod: ,,, | Performed by: INTERNAL MEDICINE

## 2022-02-18 ENCOUNTER — EXTERNAL HOSPITAL ADMISSION (OUTPATIENT)
Dept: SKILLED NURSING FACILITY | Facility: HOSPITAL | Age: 87
End: 2022-02-18
Payer: MEDICARE

## 2022-02-18 DIAGNOSIS — I25.10 CORONARY ARTERY DISEASE, UNSPECIFIED VESSEL OR LESION TYPE, UNSPECIFIED WHETHER ANGINA PRESENT, UNSPECIFIED WHETHER NATIVE OR TRANSPLANTED HEART: Primary | ICD-10-CM

## 2022-02-21 NOTE — PROGRESS NOTES
[facility]   Re-evaluation   DOS 2/18/2022    /PRESENTING HISTORY     Chief Complaint/Reason for Admission: Evaluate LTC resident with chronic diseases    History of Present Illness:  Ms. Annette Garcia is a 86 y.o. female who currently sitting up in the wheelchair unassisted with no acute distress. Condition stable with no complaints and no concerns today. Appears comfortable. Denies SOB, CP, pain, fever, chills.  Breathing even and unlabored on room air. LCTAB with no edema. Colostomy intact. VSS. Afebrile.         Review of Systems  General ROS: negative for chills, fever or weight loss  Psychological ROS: negative for hallucination, depression or suicidal ideation  Ophthalmic ROS: negative for blurry vision, photophobia or eye pain  ENT ROS: negative for epistaxis, sore throat or rhinorrhea  Respiratory ROS: no cough, shortness of breath, or wheezing  Cardiovascular ROS: no chest pain or dyspnea on exertion  Gastrointestinal ROS: no abdominal pain, change in bowel habits, or black/ bloody stools; + colostomy   Genito-Urinary ROS: no dysuria, trouble voiding, or hematuria  Musculoskeletal ROS: + muscular weakness  Neurological ROS: no syncope or seizures; no ataxia  Dermatological ROS: negative for pruritis, rash and jaundice       PAST HISTORY:     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    In the setting of septic shock and near death    Petit mal epilepsy 1954    Scoliosis of lumbar spine     Seizures     Unspecified hypothyroidism        Past Surgical History:   Procedure Laterality Date    APPENDECTOMY      BACK SURGERY  1988    vertebral fracture    BACK SURGERY  02/2013    lumbar L2-5    CATARACT EXTRACTION, BILATERAL Bilateral     CHOLECYSTECTOMY      open    EYE SURGERY Bilateral      cataract removal with lens implant    HYSTERECTOMY      PERCUTANEOUS TRANSLUMINAL ANGIOPLASTY (PTA) OF PERIPHERAL VESSEL N/A 2/3/2020    Procedure: PTA, PERIPHERAL VESSEL;  Surgeon: Timmy Simmons MD;  Location: [facility] CATH LAB/EP;  Service: Cardiology;  Laterality: N/A;    PORTACATH PLACEMENT Right 09/2016    RENAL ARTERY STENT Left 07/19/2017    SIGMOIDECTOMY  10/29/2019    SMALL INTESTINE SURGERY  08/23/2016    THROMBECTOMY N/A 2/3/2020    Procedure: THROMBECTOMY;  Surgeon: Timmy Simmons MD;  Location: [facility] CATH LAB/EP;  Service: Cardiology;  Laterality: N/A;    TONSILLECTOMY      VAGINAL HYSTERECTOMY W/ ANTERIOR AND POSTERIOR VAGINAL REPAIR         Family History   Problem Relation Age of Onset    Pancreatic cancer Mother     Hypertension Father     Heart attack Father          MEDICATIONS & ALLERGIES:     Current Outpatient Medications on File Prior to Visit   Medication Sig Dispense Refill    acetaminophen (TYLENOL) 325 MG tablet Take 2 tablets (650 mg total) by mouth every 4 (four) hours as needed for Pain.  0    apixaban (ELIQUIS) 2.5 mg Tab Take 1 tablet (2.5 mg total) by mouth 2 (two) times daily.      calcium-vitamin D 600 mg(1,500mg) -400 unit Tab Take 1 tablet by mouth once daily.      cefpodoxime (VANTIN) 100 mg/5 mL suspension Take 10 mLs (200 mg total) by mouth 2 (two) times a day. End date 9/19/20 200 mL 0    heparin flush, porcine, (HEPARIN FLUSH 10 UNITS/ML) 10 unit/mL injection 50 Units.      levothyroxine (SYNTHROID) 125 MCG tablet TAKE 1 TABLET (125 MCG TOTAL) BY MOUTH ONCE DAILY. 90 tablet 3    magnesium oxide (MAG-OX) 400 mg (241.3 mg magnesium) tablet Take 2 tablets (800 mg total) by mouth once daily.  0    metroNIDAZOLE (FLAGYL) 500 MG tablet Take 1 tablet (500 mg total) by mouth every 8 (eight) hours. End date:9/19/20      mirtazapine (REMERON) 7.5 MG Tab Take 1 tablet (7.5 mg total) by mouth every evening. 30 tablet 11    ondansetron (ZOFRAN-ODT) 4 MG TbDL Take  1 tablet (4 mg total) by mouth every 8 (eight) hours as needed.      OXcarbazepine (TRILEPTAL) 300 MG Tab Take 1 tablet (300 mg total) by mouth nightly. 90 tablet 3    PHENobarbitaL (LUMINAL) 97.2 MG tablet Take 1 tablet (97.2 mg total) by mouth every evening. 60 tablet 0    polyethylene glycol (GLYCOLAX) 17 gram PwPk Take 17 g by mouth 2 (two) times daily as needed (Constipation).  0    pravastatin (PRAVACHOL) 20 MG tablet Take 1 tablet (20 mg total) by mouth once daily. 90 tablet 3    vitamin D (VITAMIN D3) 1000 units Tab Take 1,000 Units by mouth once daily.       No current facility-administered medications on file prior to visit.        Review of patient's allergies indicates:   Allergen Reactions    Adhesive Itching and Blisters    Penicillins Anaphylaxis    Tramadol Hives    Avelox [moxifloxacin] Rash     Facial and arm itching and redness. Pt states throat closes when given.    Amoxil [amoxicillin]     Aspridrox [aspirin, buffered]     Codeine Other (See Comments)     Throat swelling    Keflex [cephalexin]      Tolerated cefepime and cefazolin    Norvasc [amlodipine]     Red dye Hives    Robitussin [guaifenesin]     Sulfa (sulfonamide antibiotics)     Tylenol [acetaminophen]      Has reaction to Tylenol with red dye and unable to take Extra Strength Tylenol/ CAN ONLY TOLERATE REG STRENGTH TYLENOL    Vicks vaporub [camphor-eucalyptus oil-menthol]        OBJECTIVE:     Vital signs:  /72, pulse 66, resp 20, sp02 97%, temp 97.3    Physical Exam:  General appearance: alert, cooperative, no distress  Constitutional:Oriented to person, place, and time  + appears well-developed and well-nourished.   HEENT: Normocephalic, atraumatic,  Eyes: conjunctivae/corneas clear, PERRL  Lungs: clear to auscultation bilaterally  Heart: regular rate and rhythm   Abdomen: soft, non-tender; bowel sounds normoactive: +colostomy   Extremities: extremities symmetric, no edema   Integument: Skin warm and dry to  touch  Neurologic: Alert and oriented X 4  Psychiatric: no pressured speech; normal affect; no evidence of impaired cognition       ASSESSMENT & PLAN:     Chronic deep vein thrombosis (DVT) of femoral vein of left lower extremity   - continue on Eliquis 2.5 mg BID.  - follow up with Dr. Simmons     Syncope  - follow up with Dr. Simmons, cardiology     Paroxysmal atrial fibrillation  DVT of deep femoral vein, left   Continue Eliquis- taking 2.5 mg bid        Slow transit constipation  Colostomy  Ostomy in left abdomen.   Ostomy Care        Old MI (myocardial infarction)  Coronary Artery Disease   Continue Statin        Acquired hypothyroidism  Continue Levothyroxine 125 mcg daily        Epilepsy  Continue trileptal and phenobarbital         Scheduled Follow-up :  No future appointments.    Post Visit Medication List:     Medication List          Accurate as of February 18, 2022 11:59 PM. If you have any questions, ask your nurse or doctor.            CONTINUE taking these medications    acetaminophen 325 MG tablet  Commonly known as: TYLENOL  Take 2 tablets (650 mg total) by mouth every 4 (four) hours as needed for Pain.     apixaban 2.5 mg Tab  Commonly known as: ELIQUIS  Take 1 tablet (2.5 mg total) by mouth 2 (two) times daily.     calcium-vitamin D 600 mg-10 mcg (400 unit) Tab     cefpodoxime 100 mg/5 mL suspension  Commonly known as: VANTIN  Take 10 mLs (200 mg total) by mouth 2 (two) times a day. End date 9/19/20     heparin flush (porcine) 10 unit/mL injection  Commonly known as: heparin flush 10 units/mL     levothyroxine 125 MCG tablet  Commonly known as: SYNTHROID  TAKE 1 TABLET (125 MCG TOTAL) BY MOUTH ONCE DAILY.     magnesium oxide 400 mg (241.3 mg magnesium) tablet  Commonly known as: MAG-OX  Take 2 tablets (800 mg total) by mouth once daily.     metroNIDAZOLE 500 MG tablet  Commonly known as: FLAGYL  Take 1 tablet (500 mg total) by mouth every 8 (eight) hours. End date:9/19/20     ondansetron 4 MG  Tbdl  Commonly known as: ZOFRAN-ODT  Take 1 tablet (4 mg total) by mouth every 8 (eight) hours as needed.     PHENobarbitaL 97.2 MG tablet  Commonly known as: LUMINAL  Take 1 tablet (97.2 mg total) by mouth every evening.     polyethylene glycol 17 gram Pwpk  Commonly known as: GLYCOLAX  Take 17 g by mouth 2 (two) times daily as needed (Constipation).     vitamin D 1000 units Tab  Commonly known as: VITAMIN D3            Signing Physician:  Pavithra Warner NP

## 2022-02-24 RX ORDER — PHENOBARBITAL 97.2 MG/1
97.2 TABLET ORAL NIGHTLY
Qty: 90 TABLET | Refills: 0 | Status: SHIPPED | OUTPATIENT
Start: 2022-02-24 | End: 2022-05-06

## 2022-03-02 ENCOUNTER — EXTERNAL HOSPITAL ADMISSION (OUTPATIENT)
Dept: SKILLED NURSING FACILITY | Facility: HOSPITAL | Age: 87
End: 2022-03-02
Payer: MEDICARE

## 2022-03-02 DIAGNOSIS — D64.9 CHRONIC ANEMIA: Primary | ICD-10-CM

## 2022-03-02 PROCEDURE — 99308 PR NURSING FAC CARE, SUBSEQ, MINOR COMPLIC: ICD-10-PCS | Mod: ,,, | Performed by: INTERNAL MEDICINE

## 2022-03-02 PROCEDURE — 99308 SBSQ NF CARE LOW MDM 20: CPT | Mod: ,,, | Performed by: INTERNAL MEDICINE

## 2022-03-02 NOTE — PROGRESS NOTES
[facility]   New Visit Progress Note   Recent Hospital Discharge     PRESENTING HISTORY     Chief Complaint/Reason for Admission:  Follow up Hospital Discharge   PCP: Jose Valles MD    History of Present Illness:  Ms. Annette Garcia is a 86 y.o. female who was recently admitted to the hospitalMs. Annette Garcia is a 86 y.o. female who was recently admitted to the hospital.Ms. Annette Garcia is a 86 y.o. female who currently sitting up in the wheelchair unassisted with no acute distress. Condition stable with no complaints and no concerns today.  Breathing even and unlabored on room air. LCTAB with no edema. Colostomy intact. VSS. Afebrile.   Pharmacy consult requested for psychoactive gradual dose reduction. She is currently on Remeron 7.5 mg QHS. No report of increased depression or insomnia.    .            ___________________________________________________________________    Today: Has port that now will not flush, will check with heme onc as to options.        Review of Systems  General ROS: negative for chills, fever or weight loss  Psychological ROS: negative for hallucination, depression or suicidal ideation  Ophthalmic ROS: negative for blurry vision, photophobia or eye pain  ENT ROS: negative for epistaxis, sore throat or rhinorrhea  Respiratory ROS: no cough, shortness of breath, or wheezing  Cardiovascular ROS: no chest pain or dyspnea on exertion  Gastrointestinal ROS: no abdominal pain, change in bowel habits, or black/ bloody stools  Genito-Urinary ROS: no dysuria, trouble voiding, or hematuria  Musculoskeletal ROS: negative for gait disturbance or muscular weakness  Neurological ROS: no syncope or seizures; no ataxia  Dermatological ROS: negative for pruritis, rash and jaundice          PAST HISTORY:     Past Medical History:   Diagnosis Date    Allergy     Cancer     colon    Coronary artery disease 8/14/2020    Disorder of kidney and ureter     E coli  bacteremia 10/29/2019    Hypertension     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    In the setting of septic shock and near death    Petit mal epilepsy 1954    Scoliosis of lumbar spine     Seizures     Unspecified hypothyroidism        Past Surgical History:   Procedure Laterality Date    APPENDECTOMY      BACK SURGERY  1988    vertebral fracture    BACK SURGERY  02/2013    lumbar L2-5    CATARACT EXTRACTION, BILATERAL Bilateral     CHOLECYSTECTOMY      open    EYE SURGERY Bilateral     cataract removal with lens implant    HYSTERECTOMY      PERCUTANEOUS TRANSLUMINAL ANGIOPLASTY (PTA) OF PERIPHERAL VESSEL N/A 2/3/2020    Procedure: PTA, PERIPHERAL VESSEL;  Surgeon: Timmy Simmons MD;  Location: [facility] CATH LAB/EP;  Service: Cardiology;  Laterality: N/A;    PORTACATH PLACEMENT Right 09/2016    RENAL ARTERY STENT Left 07/19/2017    SIGMOIDECTOMY  10/29/2019    SMALL INTESTINE SURGERY  08/23/2016    THROMBECTOMY N/A 2/3/2020    Procedure: THROMBECTOMY;  Surgeon: Timmy Simmons MD;  Location: [facility] CATH LAB/EP;  Service: Cardiology;  Laterality: N/A;    TONSILLECTOMY      VAGINAL HYSTERECTOMY W/ ANTERIOR AND POSTERIOR VAGINAL REPAIR         Family History   Problem Relation Age of Onset    Pancreatic cancer Mother     Hypertension Father     Heart attack Father          MEDICATIONS & ALLERGIES:     Current Outpatient Medications on File Prior to Visit   Medication Sig Dispense Refill    acetaminophen (TYLENOL) 325 MG tablet Take 2 tablets (650 mg total) by mouth every 4 (four) hours as needed for Pain.  0    apixaban (ELIQUIS) 2.5 mg Tab Take 1 tablet (2.5 mg total) by mouth 2 (two) times daily.      calcium-vitamin D 600 mg(1,500mg) -400 unit Tab Take 1 tablet by mouth once daily.      cefpodoxime (VANTIN) 100 mg/5 mL suspension Take 10 mLs (200 mg total) by mouth 2 (two) times a day. End date 9/19/20 200 mL 0    heparin flush, porcine, (HEPARIN FLUSH 10 UNITS/ML) 10 unit/mL injection 50  Units.      levothyroxine (SYNTHROID) 125 MCG tablet TAKE 1 TABLET (125 MCG TOTAL) BY MOUTH ONCE DAILY. 90 tablet 3    magnesium oxide (MAG-OX) 400 mg (241.3 mg magnesium) tablet Take 2 tablets (800 mg total) by mouth once daily.  0    metroNIDAZOLE (FLAGYL) 500 MG tablet Take 1 tablet (500 mg total) by mouth every 8 (eight) hours. End date:9/19/20      mirtazapine (REMERON) 7.5 MG Tab Take 1 tablet (7.5 mg total) by mouth every evening. 30 tablet 11    ondansetron (ZOFRAN-ODT) 4 MG TbDL Take 1 tablet (4 mg total) by mouth every 8 (eight) hours as needed.      OXcarbazepine (TRILEPTAL) 300 MG Tab Take 1 tablet (300 mg total) by mouth nightly. 90 tablet 3    PHENobarbitaL (LUMINAL) 97.2 MG tablet Take 1 tablet (97.2 mg total) by mouth every evening. 90 tablet 0    polyethylene glycol (GLYCOLAX) 17 gram PwPk Take 17 g by mouth 2 (two) times daily as needed (Constipation).  0    pravastatin (PRAVACHOL) 20 MG tablet Take 1 tablet (20 mg total) by mouth once daily. 90 tablet 3    vitamin D (VITAMIN D3) 1000 units Tab Take 1,000 Units by mouth once daily.       No current facility-administered medications on file prior to visit.        Review of patient's allergies indicates:   Allergen Reactions    Adhesive Itching and Blisters    Penicillins Anaphylaxis    Tramadol Hives    Avelox [moxifloxacin] Rash     Facial and arm itching and redness. Pt states throat closes when given.    Amoxil [amoxicillin]     Aspridrox [aspirin, buffered]     Codeine Other (See Comments)     Throat swelling    Keflex [cephalexin]      Tolerated cefepime and cefazolin    Norvasc [amlodipine]     Red dye Hives    Robitussin [guaifenesin]     Sulfa (sulfonamide antibiotics)     Tylenol [acetaminophen]      Has reaction to Tylenol with red dye and unable to take Extra Strength Tylenol/ CAN ONLY TOLERATE REG STRENGTH TYLENOL    Vicks vaporub [camphor-eucalyptus oil-menthol]        OBJECTIVE:     Vital Signs:  LMP  (LMP  Unknown)   Wt Readings from Last 1 Encounters:   09/14/20 1600 64.6 kg (142 lb 6.7 oz)   09/14/20 0130 64.6 kg (142 lb 6.7 oz)   09/13/20 1432 60.3 kg (133 lb)     There is no height or weight on file to calculate BMI.        Physical Exam:  LMP  (LMP Unknown)   General appearance: alert, cooperative, no distress  Constitutional:Oriented to person, place, and time  + appears well-developed and well-nourished.   HEENT: Normocephalic, atraumatic, neck symmetrical, no nasal discharge   Eyes: conjunctivae/corneas clear, PERRL, EOM's intact  Lungs: clear to auscultation bilaterally, no dullness to percussion bilaterally  Heart: regular rate and rhythm without rub; no displacement of the PMI   Abdomen: soft, non-tender; bowel sounds normoactive; no organomegaly  Extremities: extremities symmetric; no clubbing, cyanosis, or edema  Integument: Skin color, texture, turgor normal; no rashes; hair distrubution normal  Neurologic: Alert and oriented X 3, normal strength, normal coordination and gait  Psychiatric: no pressured speech; normal affect; no evidence of impaired cognition     Laboratory  Lab Results   Component Value Date    WBC 6.57 09/16/2020    HGB 8.6 (L) 09/16/2020    HCT 25.8 (L) 09/16/2020     (H) 09/16/2020     (L) 09/16/2020     BMP  Lab Results   Component Value Date     09/15/2020    K 4.1 09/15/2020     09/15/2020    CO2 25 09/15/2020    BUN 13 09/15/2020    CREATININE 0.7 09/15/2020    CALCIUM 8.7 09/15/2020    ANIONGAP 6 (L) 09/15/2020    ESTGFRAFRICA >60 09/15/2020    EGFRNONAA >60 09/15/2020     Lab Results   Component Value Date    ALT 16 09/14/2020    AST 18 09/14/2020    ALKPHOS 73 09/14/2020    BILITOT 0.2 09/14/2020     Lab Results   Component Value Date    INR 1.0 01/31/2020    INR 1.0 01/31/2020    INR 0.9 01/30/2020     No results found for: HGBA1C    Diagnostic Results:        TRANSITION OF CARE:         ASSESSMENT & PLAN:     HIGH RISK CONDITION(S):  Recurrent  moderate depression  - stable. Decreased Remeron 7.5 mg QHS to every other night x 7 nights and then discontinue.   Scheduled Follow-up :  No future appointments.       Scheduled Follow-up :  No future appointments.    Post Visit Medication List:     Medication List          Accurate as of March 2, 2022  1:19 PM. If you have any questions, ask your nurse or doctor.            CONTINUE taking these medications    acetaminophen 325 MG tablet  Commonly known as: TYLENOL  Take 2 tablets (650 mg total) by mouth every 4 (four) hours as needed for Pain.     apixaban 2.5 mg Tab  Commonly known as: ELIQUIS  Take 1 tablet (2.5 mg total) by mouth 2 (two) times daily.     calcium-vitamin D 600 mg-10 mcg (400 unit) Tab     cefpodoxime 100 mg/5 mL suspension  Commonly known as: VANTIN  Take 10 mLs (200 mg total) by mouth 2 (two) times a day. End date 9/19/20     heparin flush (porcine) 10 unit/mL injection  Commonly known as: heparin flush 10 units/mL     levothyroxine 125 MCG tablet  Commonly known as: SYNTHROID  TAKE 1 TABLET (125 MCG TOTAL) BY MOUTH ONCE DAILY.     magnesium oxide 400 mg (241.3 mg magnesium) tablet  Commonly known as: MAG-OX  Take 2 tablets (800 mg total) by mouth once daily.     metroNIDAZOLE 500 MG tablet  Commonly known as: FLAGYL  Take 1 tablet (500 mg total) by mouth every 8 (eight) hours. End date:9/19/20     mirtazapine 7.5 MG Tab  Commonly known as: REMERON  Take 1 tablet (7.5 mg total) by mouth every evening.     ondansetron 4 MG Tbdl  Commonly known as: ZOFRAN-ODT  Take 1 tablet (4 mg total) by mouth every 8 (eight) hours as needed.     OXcarbazepine 300 MG Tab  Commonly known as: TRILEPTAL  Take 1 tablet (300 mg total) by mouth nightly.     PHENobarbitaL 97.2 MG tablet  Commonly known as: LUMINAL  Take 1 tablet (97.2 mg total) by mouth every evening.     polyethylene glycol 17 gram Pwpk  Commonly known as: GLYCOLAX  Take 17 g by mouth 2 (two) times daily as needed (Constipation).     pravastatin 20  MG tablet  Commonly known as: PRAVACHOL  Take 1 tablet (20 mg total) by mouth once daily.     vitamin D 1000 units Tab  Commonly known as: VITAMIN D3            Signing Physician:  Jese Booker MD

## 2022-03-07 RX ORDER — PHENOBARBITAL 97.2 MG/1
97.2 TABLET ORAL NIGHTLY
Qty: 30 TABLET | Refills: 5 | Status: SHIPPED | OUTPATIENT
Start: 2022-03-07 | End: 2022-05-06

## 2022-03-21 ENCOUNTER — OFFICE VISIT (OUTPATIENT)
Dept: SURGERY | Facility: CLINIC | Age: 87
End: 2022-03-21
Payer: MEDICARE

## 2022-03-21 VITALS
HEIGHT: 60 IN | HEART RATE: 74 BPM | DIASTOLIC BLOOD PRESSURE: 94 MMHG | SYSTOLIC BLOOD PRESSURE: 162 MMHG | WEIGHT: 136.63 LBS | BODY MASS INDEX: 26.82 KG/M2

## 2022-03-21 DIAGNOSIS — Z95.828 PORT-A-CATH IN PLACE: Primary | ICD-10-CM

## 2022-03-21 PROCEDURE — 99213 PR OFFICE/OUTPT VISIT, EST, LEVL III, 20-29 MIN: ICD-10-PCS | Mod: 25,S$PBB,, | Performed by: STUDENT IN AN ORGANIZED HEALTH CARE EDUCATION/TRAINING PROGRAM

## 2022-03-21 PROCEDURE — 36590 PR REMOVAL TUNNELED CV CATH W SUBQ PORT OR PUMP: ICD-10-PCS | Mod: S$PBB,,, | Performed by: STUDENT IN AN ORGANIZED HEALTH CARE EDUCATION/TRAINING PROGRAM

## 2022-03-21 PROCEDURE — 99213 OFFICE O/P EST LOW 20 MIN: CPT | Mod: 25,S$PBB,, | Performed by: STUDENT IN AN ORGANIZED HEALTH CARE EDUCATION/TRAINING PROGRAM

## 2022-03-21 PROCEDURE — 36590 REMOVAL TUNNELED CV CATH: CPT | Mod: S$PBB,,, | Performed by: STUDENT IN AN ORGANIZED HEALTH CARE EDUCATION/TRAINING PROGRAM

## 2022-03-21 PROCEDURE — 36590 REMOVAL TUNNELED CV CATH: CPT | Mod: PBBFAC,PO | Performed by: STUDENT IN AN ORGANIZED HEALTH CARE EDUCATION/TRAINING PROGRAM

## 2022-03-21 PROCEDURE — 99213 OFFICE O/P EST LOW 20 MIN: CPT | Mod: PBBFAC,PO,25 | Performed by: STUDENT IN AN ORGANIZED HEALTH CARE EDUCATION/TRAINING PROGRAM

## 2022-03-21 PROCEDURE — 99999 PR PBB SHADOW E&M-EST. PATIENT-LVL III: CPT | Mod: PBBFAC,,, | Performed by: STUDENT IN AN ORGANIZED HEALTH CARE EDUCATION/TRAINING PROGRAM

## 2022-03-21 PROCEDURE — 99999 PR PBB SHADOW E&M-EST. PATIENT-LVL III: ICD-10-PCS | Mod: PBBFAC,,, | Performed by: STUDENT IN AN ORGANIZED HEALTH CARE EDUCATION/TRAINING PROGRAM

## 2022-03-24 NOTE — PROGRESS NOTES
"Annette Garcia is a 86 y.o. female patient.    Pulse: 74 (03/21/22 1044)  BP: (!) 162/94 (03/21/22 1051)  Weight: 62 kg (136 lb 9.6 oz) (03/21/22 1044)  Height: 5' (152.4 cm) (03/21/22 1044)       Removal, Portacath    Date/Time: 3/21/2022 10:20 AM  Performed by: Chris Johnson MD  Authorized by: Chris Johnson MD     Consent Done?:  Yes (Written)  Time out: Immediately prior to procedure a "time out" was called to verify the correct patient, procedure, equipment, support staff and site/side marked as required.      ANESTHESIA:  Anesthesia:  Local infiltration  Local anesthetic:  Lidocaine 1% with epinephrine    PROCEDURE DETAILS:  Wound closure:  Simple  Sutures:  4-0 Monocryl  Needle, instrument, and sponge counts were correct.  Patient tolerated the procedure well with no immediate complications.  Post-operative instructions were provided for the patient.   Catheter track pursestring closed, skin closed with monocryl. No bleeding.         3/24/2022    "

## 2022-03-24 NOTE — PROGRESS NOTES
Patient ID: Annette Garcia is a 86 y.o. female.    Chief Complaint: No chief complaint on file.      HPI:  HPI   86F known to me from colon resection several years ago, doing well from that. Has port in place due to hx of lymphoma. Has not been used in many years. Currently lives in nursing home, doing well. Eating well, Ostomy working well. Desires removal of port. On eliquis for afib.   No problems with port in the past.     Review of Systems   Constitutional: Negative for fever.   HENT: Negative for trouble swallowing.    Respiratory: Negative for shortness of breath.    Cardiovascular: Negative for chest pain.   Gastrointestinal: Negative for abdominal pain, blood in stool, nausea and vomiting.   Genitourinary: Negative for dysuria.   Musculoskeletal: Positive for gait problem.   Skin: Negative for rash and wound.   Allergic/Immunologic: Negative for immunocompromised state.   Neurological: Negative for weakness.   Hematological: Does not bruise/bleed easily.   Psychiatric/Behavioral: Negative for agitation.       Current Outpatient Medications   Medication Sig Dispense Refill    acetaminophen (TYLENOL) 325 MG tablet Take 2 tablets (650 mg total) by mouth every 4 (four) hours as needed for Pain.  0    apixaban (ELIQUIS) 2.5 mg Tab Take 1 tablet (2.5 mg total) by mouth 2 (two) times daily.      calcium-vitamin D 600 mg(1,500mg) -400 unit Tab Take 1 tablet by mouth once daily.      levothyroxine (SYNTHROID) 125 MCG tablet TAKE 1 TABLET (125 MCG TOTAL) BY MOUTH ONCE DAILY. 90 tablet 3    magnesium oxide (MAG-OX) 400 mg (241.3 mg magnesium) tablet Take 2 tablets (800 mg total) by mouth once daily.  0    ondansetron (ZOFRAN-ODT) 4 MG TbDL Take 1 tablet (4 mg total) by mouth every 8 (eight) hours as needed.      OXcarbazepine (TRILEPTAL) 300 MG Tab Take 1 tablet (300 mg total) by mouth nightly. 90 tablet 3    PHENobarbitaL (LUMINAL) 97.2 MG tablet Take 1 tablet (97.2 mg total) by mouth every evening. 90  tablet 0    PHENobarbitaL (LUMINAL) 97.2 MG tablet Take 1 tablet (97.2 mg total) by mouth every evening. 30 tablet 5    polyethylene glycol (GLYCOLAX) 17 gram PwPk Take 17 g by mouth 2 (two) times daily as needed (Constipation).  0    pravastatin (PRAVACHOL) 20 MG tablet Take 1 tablet (20 mg total) by mouth once daily. 90 tablet 3    vitamin D (VITAMIN D3) 1000 units Tab Take 1,000 Units by mouth once daily.      cefpodoxime (VANTIN) 100 mg/5 mL suspension Take 10 mLs (200 mg total) by mouth 2 (two) times a day. End date 9/19/20 200 mL 0    heparin flush, porcine, (HEPARIN FLUSH 10 UNITS/ML) 10 unit/mL injection 50 Units.      metroNIDAZOLE (FLAGYL) 500 MG tablet Take 1 tablet (500 mg total) by mouth every 8 (eight) hours. End date:9/19/20 (Patient not taking: Reported on 3/21/2022)      mirtazapine (REMERON) 7.5 MG Tab Take 1 tablet (7.5 mg total) by mouth every evening. 30 tablet 11     No current facility-administered medications for this visit.       Review of patient's allergies indicates:   Allergen Reactions    Adhesive Itching and Blisters    Penicillins Anaphylaxis    Tramadol Hives    Avelox [moxifloxacin] Rash     Facial and arm itching and redness. Pt states throat closes when given.    Amoxil [amoxicillin]     Aspridrox [aspirin, buffered]     Codeine Other (See Comments)     Throat swelling    Keflex [cephalexin]      Tolerated cefepime and cefazolin    Norvasc [amlodipine]     Red dye Hives    Robitussin [guaifenesin]     Sulfa (sulfonamide antibiotics)     Tylenol [acetaminophen]      Has reaction to Tylenol with red dye and unable to take Extra Strength Tylenol/ CAN ONLY TOLERATE REG STRENGTH TYLENOL    Vicks vaporub [camphor-eucalyptus oil-menthol]        Past Medical History:   Diagnosis Date    Allergy     Cancer     colon    Coronary artery disease 8/14/2020    Disorder of kidney and ureter     E coli bacteremia 10/29/2019    Hypertension     Lone atrial fibrillation  10/30/2019    In the setting of septic shock and near death    Petit mal epilepsy 1954    Scoliosis of lumbar spine     Seizures     Unspecified hypothyroidism        Past Surgical History:   Procedure Laterality Date    APPENDECTOMY      BACK SURGERY  1988    vertebral fracture    BACK SURGERY  02/2013    lumbar L2-5    CATARACT EXTRACTION, BILATERAL Bilateral     CHOLECYSTECTOMY      open    EYE SURGERY Bilateral     cataract removal with lens implant    HYSTERECTOMY      PERCUTANEOUS TRANSLUMINAL ANGIOPLASTY (PTA) OF PERIPHERAL VESSEL N/A 2/3/2020    Procedure: PTA, PERIPHERAL VESSEL;  Surgeon: Timmy Simmons MD;  Location: [facility] CATH LAB/EP;  Service: Cardiology;  Laterality: N/A;    PORTACATH PLACEMENT Right 09/2016    RENAL ARTERY STENT Left 07/19/2017    SIGMOIDECTOMY  10/29/2019    SMALL INTESTINE SURGERY  08/23/2016    THROMBECTOMY N/A 2/3/2020    Procedure: THROMBECTOMY;  Surgeon: Timmy Simmons MD;  Location: [facility] CATH LAB/EP;  Service: Cardiology;  Laterality: N/A;    TONSILLECTOMY      VAGINAL HYSTERECTOMY W/ ANTERIOR AND POSTERIOR VAGINAL REPAIR         Family History   Problem Relation Age of Onset    Pancreatic cancer Mother     Hypertension Father     Heart attack Father        Social History     Socioeconomic History    Marital status:    Tobacco Use    Smoking status: Never Smoker    Smokeless tobacco: Never Used   Substance and Sexual Activity    Alcohol use: No    Drug use: No       Vitals:    03/21/22 1051   BP: (!) 162/94   Pulse:        Physical Exam  Constitutional:       General: She is not in acute distress.     Appearance: She is well-developed.   HENT:      Head: Normocephalic and atraumatic.   Eyes:      General: No scleral icterus.  Cardiovascular:      Rate and Rhythm: Normal rate.   Pulmonary:      Effort: Pulmonary effort is normal.      Breath sounds: No stridor.   Abdominal:      General: There is no distension.      Palpations: Abdomen is  soft.      Tenderness: There is no abdominal tenderness.   Lymphadenopathy:      Cervical: No cervical adenopathy.   Skin:     General: Skin is warm.      Findings: No erythema.          Neurological:      Mental Status: She is alert and oriented to person, place, and time.   Psychiatric:         Behavior: Behavior normal.         Assessment & Plan:   86F with right IJ mediport in place, desires removal  Remove port in office

## 2022-05-06 RX ORDER — PHENOBARBITAL 97.2 MG/1
97.2 TABLET ORAL NIGHTLY
Qty: 30 TABLET | Refills: 5 | Status: SHIPPED | OUTPATIENT
Start: 2022-05-06 | End: 2022-06-13

## 2022-05-20 ENCOUNTER — HOSPITAL ENCOUNTER (INPATIENT)
Facility: HOSPITAL | Age: 87
LOS: 2 days | Discharge: HOME | DRG: 389 | End: 2022-05-23
Attending: EMERGENCY MEDICINE | Admitting: FAMILY MEDICINE
Payer: MEDICARE

## 2022-05-20 DIAGNOSIS — R07.9 CHEST PAIN: ICD-10-CM

## 2022-05-20 DIAGNOSIS — R00.0 TACHYCARDIA WITH HYPERTENSION: ICD-10-CM

## 2022-05-20 DIAGNOSIS — K56.600 PARTIAL SMALL BOWEL OBSTRUCTION: ICD-10-CM

## 2022-05-20 DIAGNOSIS — R11.2 NAUSEA AND VOMITING, INTRACTABILITY OF VOMITING NOT SPECIFIED, UNSPECIFIED VOMITING TYPE: Primary | ICD-10-CM

## 2022-05-20 DIAGNOSIS — I10 TACHYCARDIA WITH HYPERTENSION: ICD-10-CM

## 2022-05-20 DIAGNOSIS — R10.31 RIGHT LOWER QUADRANT ABDOMINAL PAIN: ICD-10-CM

## 2022-05-20 LAB
ALBUMIN SERPL BCP-MCNC: 4.3 G/DL (ref 3.5–5.2)
ALP SERPL-CCNC: 121 U/L (ref 55–135)
ALT SERPL W/O P-5'-P-CCNC: 25 U/L (ref 10–44)
ANION GAP SERPL CALC-SCNC: 20 MMOL/L (ref 8–16)
AST SERPL-CCNC: 23 U/L (ref 10–40)
BASOPHILS # BLD AUTO: 0.02 K/UL (ref 0–0.2)
BASOPHILS NFR BLD: 0.1 % (ref 0–1.9)
BILIRUB SERPL-MCNC: 0.5 MG/DL (ref 0.1–1)
BUN SERPL-MCNC: 16 MG/DL (ref 8–23)
CALCIUM SERPL-MCNC: 10.2 MG/DL (ref 8.7–10.5)
CHLORIDE SERPL-SCNC: 88 MMOL/L (ref 95–110)
CO2 SERPL-SCNC: 29 MMOL/L (ref 23–29)
CREAT SERPL-MCNC: 0.9 MG/DL (ref 0.5–1.4)
DIFFERENTIAL METHOD: ABNORMAL
EOSINOPHIL # BLD AUTO: 0 K/UL (ref 0–0.5)
EOSINOPHIL NFR BLD: 0 % (ref 0–8)
ERYTHROCYTE [DISTWIDTH] IN BLOOD BY AUTOMATED COUNT: 13.6 % (ref 11.5–14.5)
EST. GFR  (AFRICAN AMERICAN): >60 ML/MIN/1.73 M^2
EST. GFR  (NON AFRICAN AMERICAN): 59 ML/MIN/1.73 M^2
GLUCOSE SERPL-MCNC: 155 MG/DL (ref 70–110)
HCT VFR BLD AUTO: 41.1 % (ref 37–48.5)
HGB BLD-MCNC: 13.7 G/DL (ref 12–16)
IMM GRANULOCYTES # BLD AUTO: 0.06 K/UL (ref 0–0.04)
IMM GRANULOCYTES NFR BLD AUTO: 0.4 % (ref 0–0.5)
LACTATE SERPL-SCNC: 2 MMOL/L (ref 0.5–2.2)
LIPASE SERPL-CCNC: 3 U/L (ref 4–60)
LYMPHOCYTES # BLD AUTO: 1.1 K/UL (ref 1–4.8)
LYMPHOCYTES NFR BLD: 6.6 % (ref 18–48)
MAGNESIUM SERPL-MCNC: 1.9 MG/DL (ref 1.6–2.6)
MCH RBC QN AUTO: 32.5 PG (ref 27–31)
MCHC RBC AUTO-ENTMCNC: 33.3 G/DL (ref 32–36)
MCV RBC AUTO: 97 FL (ref 82–98)
MONOCYTES # BLD AUTO: 0.9 K/UL (ref 0.3–1)
MONOCYTES NFR BLD: 5.4 % (ref 4–15)
NEUTROPHILS # BLD AUTO: 14.1 K/UL (ref 1.8–7.7)
NEUTROPHILS NFR BLD: 87.5 % (ref 38–73)
NRBC BLD-RTO: 0 /100 WBC
PLATELET # BLD AUTO: 233 K/UL (ref 150–450)
PMV BLD AUTO: 10.3 FL (ref 9.2–12.9)
POTASSIUM SERPL-SCNC: 4.4 MMOL/L (ref 3.5–5.1)
PROT SERPL-MCNC: 8 G/DL (ref 6–8.4)
RBC # BLD AUTO: 4.22 M/UL (ref 4–5.4)
SODIUM SERPL-SCNC: 137 MMOL/L (ref 136–145)
WBC # BLD AUTO: 16.14 K/UL (ref 3.9–12.7)

## 2022-05-20 PROCEDURE — 83605 ASSAY OF LACTIC ACID: CPT | Performed by: EMERGENCY MEDICINE

## 2022-05-20 PROCEDURE — 25500020 PHARM REV CODE 255: Performed by: EMERGENCY MEDICINE

## 2022-05-20 PROCEDURE — 96374 THER/PROPH/DIAG INJ IV PUSH: CPT

## 2022-05-20 PROCEDURE — 80053 COMPREHEN METABOLIC PANEL: CPT | Performed by: EMERGENCY MEDICINE

## 2022-05-20 PROCEDURE — 63600175 PHARM REV CODE 636 W HCPCS: Performed by: EMERGENCY MEDICINE

## 2022-05-20 PROCEDURE — 83690 ASSAY OF LIPASE: CPT | Performed by: EMERGENCY MEDICINE

## 2022-05-20 PROCEDURE — A9698 NON-RAD CONTRAST MATERIALNOC: HCPCS | Performed by: EMERGENCY MEDICINE

## 2022-05-20 PROCEDURE — 83735 ASSAY OF MAGNESIUM: CPT | Performed by: EMERGENCY MEDICINE

## 2022-05-20 PROCEDURE — U0002 COVID-19 LAB TEST NON-CDC: HCPCS | Performed by: EMERGENCY MEDICINE

## 2022-05-20 PROCEDURE — 99285 EMERGENCY DEPT VISIT HI MDM: CPT | Mod: 25

## 2022-05-20 PROCEDURE — 85025 COMPLETE CBC W/AUTO DIFF WBC: CPT | Performed by: EMERGENCY MEDICINE

## 2022-05-20 RX ORDER — ONDANSETRON 2 MG/ML
4 INJECTION INTRAMUSCULAR; INTRAVENOUS
Status: COMPLETED | OUTPATIENT
Start: 2022-05-20 | End: 2022-05-20

## 2022-05-20 RX ORDER — OXCARBAZEPINE 300 MG/1
150 TABLET, FILM COATED ORAL NIGHTLY
COMMUNITY
End: 2022-08-29 | Stop reason: DRUGHIGH

## 2022-05-20 RX ORDER — ONDANSETRON HYDROCHLORIDE 8 MG/1
8 TABLET, FILM COATED ORAL EVERY 6 HOURS PRN
COMMUNITY
End: 2023-02-14

## 2022-05-20 RX ORDER — PRAVASTATIN SODIUM 20 MG/1
20 TABLET ORAL NIGHTLY
COMMUNITY

## 2022-05-20 RX ADMIN — ONDANSETRON 4 MG: 2 INJECTION INTRAMUSCULAR; INTRAVENOUS at 08:05

## 2022-05-20 RX ADMIN — IOHEXOL 1000 ML: 9 SOLUTION ORAL at 06:05

## 2022-05-20 NOTE — ED PROVIDER NOTES
Encounter Date: 5/20/2022       History     Chief Complaint   Patient presents with    Vomiting     Pt presents to ED today from [facility] c/o vomiting, upon EMS arrival o2 sat 86 % on room air. Pt placed on 4L via NC o2 sst increased to 96 %   /90     This is an 86-year-old female who presents from [facility] for evaluation of recurrent episodes of vomiting as well as nausea today.  She has multiple medical comorbidities including ostomy.  She notes that she has had some element of abdominal pain associated with the vomiting today.  She has not take anything that she knows of to help with the, she believe she has vomited at least 5 times reportedly 8. She can not recall any episodes like this in the past, nothing in particular seems to make it any better.          Review of patient's allergies indicates:   Allergen Reactions    Adhesive Itching and Blisters    Penicillins Anaphylaxis    Tramadol Hives    Avelox [moxifloxacin] Rash     Facial and arm itching and redness. Pt states throat closes when given.    Amoxil [amoxicillin]     Aspridrox [aspirin, buffered]     Codeine Other (See Comments)     Throat swelling    Keflex [cephalexin]      Tolerated cefepime and cefazolin    Norvasc [amlodipine]     Red dye Hives    Robitussin [guaifenesin]     Sulfa (sulfonamide antibiotics)     Tylenol [acetaminophen]      Has reaction to Tylenol with red dye and unable to take Extra Strength Tylenol/ CAN ONLY TOLERATE REG STRENGTH TYLENOL    Vicks vaporub [camphor-eucalyptus oil-menthol]      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    In the setting of septic shock and near death    Petit mal epilepsy 1954    Scoliosis of lumbar spine     Seizures     Unspecified hypothyroidism       Past Surgical History:   Procedure Laterality Date    APPENDECTOMY      BACK SURGERY  1988    vertebral fracture    BACK SURGERY  02/2013    lumbar L2-5    CATARACT EXTRACTION, BILATERAL Bilateral     CHOLECYSTECTOMY      open    EYE SURGERY Bilateral     cataract removal with lens implant    HYSTERECTOMY      PERCUTANEOUS TRANSLUMINAL ANGIOPLASTY (PTA) OF PERIPHERAL VESSEL N/A 2/3/2020    Procedure: PTA, PERIPHERAL VESSEL;  Surgeon: Timmy Simmons MD;  Location: [facility] CATH LAB/EP;  Service: Cardiology;  Laterality: N/A;    PORTACATH PLACEMENT Right 09/2016    RENAL ARTERY STENT Left 07/19/2017    SIGMOIDECTOMY  10/29/2019    SMALL INTESTINE SURGERY  08/23/2016    THROMBECTOMY N/A 2/3/2020    Procedure: THROMBECTOMY;  Surgeon: Timmy Simmons MD;  Location: [facility] CATH LAB/EP;  Service: Cardiology;  Laterality: N/A;    TONSILLECTOMY      VAGINAL HYSTERECTOMY W/ ANTERIOR AND POSTERIOR VAGINAL REPAIR       Family History   Problem Relation Age of Onset    Pancreatic cancer Mother     Hypertension Father     Heart attack Father      Social History     Tobacco Use    Smoking status: Never Smoker    Smokeless tobacco: Never Used   Substance Use Topics    Alcohol use: No    Drug use: No     Review of Systems  Constitutional-no fever  HEENT-no congestion  Eyes-no redness  Respiratory-no shortness of breath  Cardio-no chest pain  GI-positive abdominal pain, positive vomiting  Endocrine-no cold intolerance  -no difficulty urinating  MSK-no myalgias  Skin-no rashes  Allergy-no environmental allergy  Neurologic-, no headache  Hematology-no swollen nodes  Behavioral-no confusion  Physical Exam     Initial Vitals [05/20/22 1727]   BP Pulse Resp Temp SpO2   (!) 180/90 99 18 98.5 °F (36.9 °C) 96 %      MAP       --         Physical Exam  Constitutional:  Chronically ill-appearing 86-year-old female in no obvious distress  Eyes: Conjunctivae normal.  ENT       Head: Normocephalic, atraumatic.        Nose: Normal external appearance        Mouth/Throat: no strigulous respirations   Hematological/Lymphatic/Immunilogical: no visible lymphadenopathy   Cardiovascular: Normal rate,   Respiratory: Normal respiratory effort.   Gastrointestinal:  Ostomy in place in left lower quadrant, no stool or gas in the bag, open cholecystectomy scar well-approximated and healed, diffusely tender abdominal examination most prominent in the right lower quadrant with rebound,  Musculoskeletal: Normal range of motion in all extremities. No obvious deformities or swelling.  Neurologic: Alert, oriented. Normal speech and language. No gross focal neurologic deficits are appreciated.  Skin: Skin is warm, dry. No rash noted.  Psychiatric: Mood and affect are normal.   ED Course   Procedures  Labs Reviewed   CBC W/ AUTO DIFFERENTIAL - Abnormal; Notable for the following components:       Result Value    WBC 16.14 (*)     MCH 32.5 (*)     Gran # (ANC) 14.1 (*)     Immature Grans (Abs) 0.06 (*)     Gran % 87.5 (*)     Lymph % 6.6 (*)     All other components within normal limits   COMPREHENSIVE METABOLIC PANEL - Abnormal; Notable for the following components:    Chloride 88 (*)     Glucose 155 (*)     Anion Gap 20 (*)     eGFR if non  59 (*)     All other components within normal limits   LIPASE - Abnormal; Notable for the following components:    Lipase 3 (*)     All other components within normal limits   LACTIC ACID, PLASMA   MAGNESIUM   URINALYSIS, REFLEX TO URINE CULTURE   COMPREHENSIVE METABOLIC PANEL   CBC W/ AUTO DIFFERENTIAL   SARS-COV-2 RDRP GENE          Imaging Results          CT Abdomen Pelvis  Without Contrast (Final result)  Result time 05/20/22 20:02:53    Final result by Richard Romero DO (05/20/22 20:02:53)                 Impression:      1. Single nonspecific dilated loop of small bowel without transition point.  Low-grade partial obstruction not excluded.  2. Thickening of the wall of the distal  esophagus with intraluminal contrast material concerning for reflux esophagitis.  Further evaluation with EGD is recommended to exclude a neoplastic process.  3. Tree-in-bud nodules in the right middle lobe and the lingula with mild bronchiectasis in the lingula.  Findings are likely related to an atypical infectious process such as MAC.      Electronically signed by: Richard Romero  Date:    05/20/2022  Time:    20:02             Narrative:    EXAMINATION:  CT ABDOMEN PELVIS WITHOUT CONTRAST    CLINICAL HISTORY:  Bowel obstruction suspected;    TECHNIQUE:  Multiplanar images were obtained of the abdomen and pelvis from the hemidiaphragms through the symphysis pubis without intravenous contrast.    COMPARISON:  CT of the abdomen and pelvis from 09/13/2020.    FINDINGS:  Lung Bases: There are tree-in-bud nodules in the right middle lobe and the lingula.  There is bronchiectasis in the lingula, partially imaged.  There are small bilateral pleural effusions.  Bandlike opacity in the medial left lower lobe, likely atelectasis or scarring.    Heart: Heart size is normal.  No pericardial effusion.    Distal esophagus: There is thickening of the distal esophageal wall.  Oral contrast is seen within the distal esophageal lumen.  There is a probable small hiatal hernia.    Liver: Normal in size and attenuation without focal hepatic lesion.    Biliary tract: No intrahepatic or extrahepatic biliary ductal dilatation.    Gallbladder: Surgically absent.    Pancreas: There is mild fatty atrophy of the pancreas.  No focal lesion.  No pancreatic ductal dilation.    Spleen: Normal size without focal lesion.    Adrenals: There is mild heterogeneous thickening of the bilateral adrenal glands, nonspecific and similar to prior    Kidneys and urinary collecting systems: Normal.  No hydronephrosis or urolithiasis.    Lymph nodes: None enlarged.    Stomach and bowel: The stomach is normal.  There is a single moderately distended loop of  small bowel measuring up to 3.7 cm in diameter, without well-defined transition point.  There is a right lower quadrant small bowel anastomosis with continued bowel distension, similar to prior.  There is a left lower quadrant colostomy with a fat containing parastomal hernia.  There is a J-pouch.    Peritoneum and mesentery: No ascites or free intraperitoneal air. No abdominal fluid collection.    Vasculature: Severe calcified atherosclerosis.  No aneurysm.  There is a stent within the left common and external iliac veins.    Urinary bladder: Normal.    Reproductive organs: The uterus is unremarkable.    Body wall: Midline abdominal wall incision compatible with prior surgery.    Musculoskeletal: No aggressive osseous lesion.  There are multilevel degenerative changes of the lumbar spine with postop changes of L4 laminectomy.  There are remote compression fractures of the T11, L2, and L4 vertebral bodies with unchanged height loss from prior.                                 Medications   sodium chloride 0.9% flush 10 mL (has no administration in time range)   albuterol-ipratropium 2.5 mg-0.5 mg/3 mL nebulizer solution 3 mL (has no administration in time range)   melatonin tablet 6 mg (has no administration in time range)   polyethylene glycol packet 17 g (has no administration in time range)   naloxone 0.4 mg/mL injection 0.02 mg (has no administration in time range)   glucose chewable tablet 16 g (has no administration in time range)   glucose chewable tablet 24 g (has no administration in time range)   glucagon (human recombinant) injection 1 mg (has no administration in time range)   0.9%  NaCl infusion (has no administration in time range)   acetaminophen tablet 650 mg (has no administration in time range)   ondansetron injection 4 mg (4 mg Intravenous Given 5/20/22 2046)   iohexoL (OMNIPAQUE 9) oral solution 1,000 mL (1,000 mLs Oral Given 5/20/22 1800)   sodium chloride 0.9% bolus 1,000 mL (1,000 mLs  Intravenous New Bag 5/21/22 0025)     Medical Decision Making:   History:   Old Medical Records: I decided to obtain old medical records.  Old Records Summarized: records from clinic visits and records from previous admission(s).  Differential Diagnosis:   Obstruction, appendicitis, gastroenteritis, colitis  Clinical Tests:   Lab Tests: Ordered and Reviewed  Radiological Study: Ordered and Reviewed  ED Management:  This woman's examination is concerning for potential obstruction.  CT is consistent with a partial small-bowel obstruction, after several hours emergency department she had no appreciable output through the ostomy.  She has leukocytosis, labs otherwise unrevealing lactic acid relatively normal.  Discussed the case with on-call general surgeon Dr. Joyner.  Discussed the case with on-call resident for [facility] internal medicine.  Will plan for admission to the hospital, NPO status, IV fluids and reassessment.  Will defer NG tube placement at this time as she is not actively vomiting.  Will plan for reassessment and monitoring.                      Clinical Impression:   Final diagnoses:  [K56.600] Partial small bowel obstruction  [R11.2] Nausea and vomiting, intractability of vomiting not specified, unspecified vomiting type (Primary)  [R10.31] Right lower quadrant abdominal pain          ED Disposition Condition    Observation               Rajiv Estrella MD  05/21/22 7197

## 2022-05-21 PROBLEM — Z98.890 HISTORY OF STENT INSERTION OF RENAL ARTERY: Chronic | Status: RESOLVED | Noted: 2017-07-19 | Resolved: 2022-05-21

## 2022-05-21 PROBLEM — K56.600 PARTIAL SMALL BOWEL OBSTRUCTION: Status: ACTIVE | Noted: 2022-05-21

## 2022-05-21 PROBLEM — D72.829 LEUKOCYTOSIS: Status: ACTIVE | Noted: 2022-05-21

## 2022-05-21 LAB
ALBUMIN SERPL BCP-MCNC: 3.7 G/DL (ref 3.5–5.2)
ALBUMIN SERPL BCP-MCNC: 3.9 G/DL (ref 3.5–5.2)
ALP SERPL-CCNC: 103 U/L (ref 55–135)
ALP SERPL-CCNC: 109 U/L (ref 55–135)
ALT SERPL W/O P-5'-P-CCNC: 19 U/L (ref 10–44)
ALT SERPL W/O P-5'-P-CCNC: 24 U/L (ref 10–44)
ANION GAP SERPL CALC-SCNC: 16 MMOL/L (ref 8–16)
ANION GAP SERPL CALC-SCNC: 17 MMOL/L (ref 8–16)
AST SERPL-CCNC: 21 U/L (ref 10–40)
AST SERPL-CCNC: 25 U/L (ref 10–40)
BACTERIA #/AREA URNS HPF: ABNORMAL /HPF
BASOPHILS # BLD AUTO: 0.02 K/UL (ref 0–0.2)
BASOPHILS NFR BLD: 0.1 % (ref 0–1.9)
BILIRUB SERPL-MCNC: 0.7 MG/DL (ref 0.1–1)
BILIRUB SERPL-MCNC: 0.8 MG/DL (ref 0.1–1)
BILIRUB UR QL STRIP: NEGATIVE
BUN SERPL-MCNC: 13 MG/DL (ref 8–23)
BUN SERPL-MCNC: 15 MG/DL (ref 8–23)
CALCIUM SERPL-MCNC: 9.2 MG/DL (ref 8.7–10.5)
CALCIUM SERPL-MCNC: 9.2 MG/DL (ref 8.7–10.5)
CHLORIDE SERPL-SCNC: 92 MMOL/L (ref 95–110)
CHLORIDE SERPL-SCNC: 94 MMOL/L (ref 95–110)
CLARITY UR: CLEAR
CO2 SERPL-SCNC: 27 MMOL/L (ref 23–29)
CO2 SERPL-SCNC: 28 MMOL/L (ref 23–29)
COLOR UR: YELLOW
CREAT SERPL-MCNC: 0.6 MG/DL (ref 0.5–1.4)
CREAT SERPL-MCNC: 0.7 MG/DL (ref 0.5–1.4)
CTP QC/QA: YES
DIFFERENTIAL METHOD: ABNORMAL
EOSINOPHIL # BLD AUTO: 0 K/UL (ref 0–0.5)
EOSINOPHIL NFR BLD: 0 % (ref 0–8)
ERYTHROCYTE [DISTWIDTH] IN BLOOD BY AUTOMATED COUNT: 13.7 % (ref 11.5–14.5)
EST. GFR  (AFRICAN AMERICAN): >60 ML/MIN/1.73 M^2
EST. GFR  (AFRICAN AMERICAN): >60 ML/MIN/1.73 M^2
EST. GFR  (NON AFRICAN AMERICAN): >60 ML/MIN/1.73 M^2
EST. GFR  (NON AFRICAN AMERICAN): >60 ML/MIN/1.73 M^2
GLUCOSE SERPL-MCNC: 122 MG/DL (ref 70–110)
GLUCOSE SERPL-MCNC: 136 MG/DL (ref 70–110)
GLUCOSE UR QL STRIP: NEGATIVE
HCT VFR BLD AUTO: 44 % (ref 37–48.5)
HGB BLD-MCNC: 14.8 G/DL (ref 12–16)
HGB UR QL STRIP: ABNORMAL
HYALINE CASTS #/AREA URNS LPF: 0 /LPF
IMM GRANULOCYTES # BLD AUTO: 0.07 K/UL (ref 0–0.04)
IMM GRANULOCYTES NFR BLD AUTO: 0.5 % (ref 0–0.5)
KETONES UR QL STRIP: ABNORMAL
LEUKOCYTE ESTERASE UR QL STRIP: ABNORMAL
LYMPHOCYTES # BLD AUTO: 1.4 K/UL (ref 1–4.8)
LYMPHOCYTES NFR BLD: 8.9 % (ref 18–48)
MAGNESIUM SERPL-MCNC: 1.7 MG/DL (ref 1.6–2.6)
MCH RBC QN AUTO: 32.5 PG (ref 27–31)
MCHC RBC AUTO-ENTMCNC: 33.6 G/DL (ref 32–36)
MCV RBC AUTO: 97 FL (ref 82–98)
MICROSCOPIC COMMENT: ABNORMAL
MONOCYTES # BLD AUTO: 1.1 K/UL (ref 0.3–1)
MONOCYTES NFR BLD: 7.3 % (ref 4–15)
NEUTROPHILS # BLD AUTO: 12.7 K/UL (ref 1.8–7.7)
NEUTROPHILS NFR BLD: 83.2 % (ref 38–73)
NITRITE UR QL STRIP: NEGATIVE
NRBC BLD-RTO: 0 /100 WBC
PH UR STRIP: 7 [PH] (ref 5–8)
PHENOBARB SERPL-MCNC: 20.6 UG/ML (ref 15–40)
PLATELET # BLD AUTO: 206 K/UL (ref 150–450)
PMV BLD AUTO: 9.8 FL (ref 9.2–12.9)
POTASSIUM SERPL-SCNC: 3.5 MMOL/L (ref 3.5–5.1)
POTASSIUM SERPL-SCNC: 3.6 MMOL/L (ref 3.5–5.1)
PROT SERPL-MCNC: 7 G/DL (ref 6–8.4)
PROT SERPL-MCNC: 7.2 G/DL (ref 6–8.4)
PROT UR QL STRIP: ABNORMAL
RBC # BLD AUTO: 4.55 M/UL (ref 4–5.4)
RBC #/AREA URNS HPF: 9 /HPF (ref 0–4)
SARS-COV-2 RDRP RESP QL NAA+PROBE: NEGATIVE
SODIUM SERPL-SCNC: 137 MMOL/L (ref 136–145)
SODIUM SERPL-SCNC: 137 MMOL/L (ref 136–145)
SP GR UR STRIP: 1.02 (ref 1–1.03)
TROPONIN I SERPL DL<=0.01 NG/ML-MCNC: 0.03 NG/ML (ref 0–0.03)
URN SPEC COLLECT METH UR: ABNORMAL
UROBILINOGEN UR STRIP-ACNC: NEGATIVE EU/DL
WBC # BLD AUTO: 15.26 K/UL (ref 3.9–12.7)
WBC #/AREA URNS HPF: 1 /HPF (ref 0–5)

## 2022-05-21 PROCEDURE — 81000 URINALYSIS NONAUTO W/SCOPE: CPT | Performed by: EMERGENCY MEDICINE

## 2022-05-21 PROCEDURE — 25000003 PHARM REV CODE 250: Performed by: EMERGENCY MEDICINE

## 2022-05-21 PROCEDURE — 99900035 HC TECH TIME PER 15 MIN (STAT)

## 2022-05-21 PROCEDURE — 36415 COLL VENOUS BLD VENIPUNCTURE: CPT | Performed by: FAMILY MEDICINE

## 2022-05-21 PROCEDURE — 93010 EKG 12-LEAD: ICD-10-PCS | Mod: ,,, | Performed by: INTERNAL MEDICINE

## 2022-05-21 PROCEDURE — 25000003 PHARM REV CODE 250: Performed by: STUDENT IN AN ORGANIZED HEALTH CARE EDUCATION/TRAINING PROGRAM

## 2022-05-21 PROCEDURE — 25000003 PHARM REV CODE 250: Performed by: NURSE PRACTITIONER

## 2022-05-21 PROCEDURE — 93010 ELECTROCARDIOGRAM REPORT: CPT | Mod: ,,, | Performed by: INTERNAL MEDICINE

## 2022-05-21 PROCEDURE — 93005 ELECTROCARDIOGRAM TRACING: CPT

## 2022-05-21 PROCEDURE — 83735 ASSAY OF MAGNESIUM: CPT | Performed by: FAMILY MEDICINE

## 2022-05-21 PROCEDURE — 51701 INSERT BLADDER CATHETER: CPT

## 2022-05-21 PROCEDURE — 94760 N-INVAS EAR/PLS OXIMETRY 1: CPT

## 2022-05-21 PROCEDURE — 80053 COMPREHEN METABOLIC PANEL: CPT | Performed by: FAMILY MEDICINE

## 2022-05-21 PROCEDURE — 99222 1ST HOSP IP/OBS MODERATE 55: CPT | Mod: ,,, | Performed by: STUDENT IN AN ORGANIZED HEALTH CARE EDUCATION/TRAINING PROGRAM

## 2022-05-21 PROCEDURE — 21400001 HC TELEMETRY ROOM

## 2022-05-21 PROCEDURE — 85025 COMPLETE CBC W/AUTO DIFF WBC: CPT | Performed by: NURSE PRACTITIONER

## 2022-05-21 PROCEDURE — 80183 DRUG SCRN QUANT OXCARBAZEPIN: CPT | Performed by: NURSE PRACTITIONER

## 2022-05-21 PROCEDURE — 84484 ASSAY OF TROPONIN QUANT: CPT | Performed by: FAMILY MEDICINE

## 2022-05-21 PROCEDURE — 80184 ASSAY OF PHENOBARBITAL: CPT | Performed by: NURSE PRACTITIONER

## 2022-05-21 PROCEDURE — 80053 COMPREHEN METABOLIC PANEL: CPT | Mod: 91 | Performed by: NURSE PRACTITIONER

## 2022-05-21 PROCEDURE — 25000003 PHARM REV CODE 250: Performed by: FAMILY MEDICINE

## 2022-05-21 PROCEDURE — 99222 PR INITIAL HOSPITAL CARE,LEVL II: ICD-10-PCS | Mod: ,,, | Performed by: STUDENT IN AN ORGANIZED HEALTH CARE EDUCATION/TRAINING PROGRAM

## 2022-05-21 RX ORDER — GLUCAGON 1 MG
1 KIT INJECTION
Status: DISCONTINUED | OUTPATIENT
Start: 2022-05-21 | End: 2022-05-23 | Stop reason: HOSPADM

## 2022-05-21 RX ORDER — POLYETHYLENE GLYCOL 3350 17 G/17G
17 POWDER, FOR SOLUTION ORAL DAILY PRN
Status: DISCONTINUED | OUTPATIENT
Start: 2022-05-21 | End: 2022-05-23 | Stop reason: HOSPADM

## 2022-05-21 RX ORDER — LANOLIN ALCOHOL/MO/W.PET/CERES
400 CREAM (GRAM) TOPICAL 2 TIMES DAILY
Status: DISCONTINUED | OUTPATIENT
Start: 2022-05-21 | End: 2022-05-23 | Stop reason: HOSPADM

## 2022-05-21 RX ORDER — DOCUSATE SODIUM 100 MG/1
100 CAPSULE, LIQUID FILLED ORAL 2 TIMES DAILY
Status: DISCONTINUED | OUTPATIENT
Start: 2022-05-21 | End: 2022-05-23 | Stop reason: HOSPADM

## 2022-05-21 RX ORDER — ENOXAPARIN SODIUM 100 MG/ML
40 INJECTION SUBCUTANEOUS EVERY 24 HOURS
Status: DISCONTINUED | OUTPATIENT
Start: 2022-05-21 | End: 2022-05-21

## 2022-05-21 RX ORDER — IBUPROFEN 200 MG
16 TABLET ORAL
Status: DISCONTINUED | OUTPATIENT
Start: 2022-05-21 | End: 2022-05-23 | Stop reason: HOSPADM

## 2022-05-21 RX ORDER — ACETAMINOPHEN 325 MG/1
650 TABLET ORAL EVERY 4 HOURS PRN
Status: DISCONTINUED | OUTPATIENT
Start: 2022-05-21 | End: 2022-05-23 | Stop reason: HOSPADM

## 2022-05-21 RX ORDER — TALC
6 POWDER (GRAM) TOPICAL NIGHTLY PRN
Status: DISCONTINUED | OUTPATIENT
Start: 2022-05-21 | End: 2022-05-23 | Stop reason: HOSPADM

## 2022-05-21 RX ORDER — DILTIAZEM HYDROCHLORIDE 5 MG/ML
10 INJECTION INTRAVENOUS ONCE
Status: COMPLETED | OUTPATIENT
Start: 2022-05-21 | End: 2022-05-21

## 2022-05-21 RX ORDER — METOPROLOL TARTRATE 25 MG/1
25 TABLET, FILM COATED ORAL 2 TIMES DAILY
Status: DISCONTINUED | OUTPATIENT
Start: 2022-05-21 | End: 2022-05-23 | Stop reason: HOSPADM

## 2022-05-21 RX ORDER — SODIUM CHLORIDE 0.9 % (FLUSH) 0.9 %
10 SYRINGE (ML) INJECTION EVERY 8 HOURS
Status: DISCONTINUED | OUTPATIENT
Start: 2022-05-21 | End: 2022-05-21

## 2022-05-21 RX ORDER — IPRATROPIUM BROMIDE AND ALBUTEROL SULFATE 2.5; .5 MG/3ML; MG/3ML
3 SOLUTION RESPIRATORY (INHALATION) EVERY 4 HOURS PRN
Status: DISCONTINUED | OUTPATIENT
Start: 2022-05-21 | End: 2022-05-23 | Stop reason: HOSPADM

## 2022-05-21 RX ORDER — POLYETHYLENE GLYCOL 3350 17 G/17G
17 POWDER, FOR SOLUTION ORAL 2 TIMES DAILY
Status: DISCONTINUED | OUTPATIENT
Start: 2022-05-21 | End: 2022-05-23 | Stop reason: HOSPADM

## 2022-05-21 RX ORDER — PRAVASTATIN SODIUM 20 MG/1
20 TABLET ORAL DAILY
Status: DISCONTINUED | OUTPATIENT
Start: 2022-05-21 | End: 2022-05-23 | Stop reason: HOSPADM

## 2022-05-21 RX ORDER — NALOXONE HCL 0.4 MG/ML
0.02 VIAL (ML) INJECTION
Status: DISCONTINUED | OUTPATIENT
Start: 2022-05-21 | End: 2022-05-23 | Stop reason: HOSPADM

## 2022-05-21 RX ORDER — DILTIAZEM HYDROCHLORIDE 5 MG/ML
10 INJECTION INTRAVENOUS ONCE
Status: DISCONTINUED | OUTPATIENT
Start: 2022-05-21 | End: 2022-05-21

## 2022-05-21 RX ORDER — LABETALOL HYDROCHLORIDE 5 MG/ML
10 INJECTION, SOLUTION INTRAVENOUS EVERY 6 HOURS PRN
Status: DISCONTINUED | OUTPATIENT
Start: 2022-05-21 | End: 2022-05-23 | Stop reason: HOSPADM

## 2022-05-21 RX ORDER — BISACODYL 10 MG
10 SUPPOSITORY, RECTAL RECTAL DAILY
Status: DISCONTINUED | OUTPATIENT
Start: 2022-05-21 | End: 2022-05-23 | Stop reason: HOSPADM

## 2022-05-21 RX ORDER — IBUPROFEN 200 MG
24 TABLET ORAL
Status: DISCONTINUED | OUTPATIENT
Start: 2022-05-21 | End: 2022-05-23 | Stop reason: HOSPADM

## 2022-05-21 RX ORDER — OXCARBAZEPINE 150 MG/1
150 TABLET, FILM COATED ORAL NIGHTLY
Status: DISCONTINUED | OUTPATIENT
Start: 2022-05-21 | End: 2022-05-23 | Stop reason: HOSPADM

## 2022-05-21 RX ORDER — SYRING-NEEDL,DISP,INSUL,0.3 ML 29 G X1/2"
148 SYRINGE, EMPTY DISPOSABLE MISCELLANEOUS ONCE
Status: COMPLETED | OUTPATIENT
Start: 2022-05-21 | End: 2022-05-21

## 2022-05-21 RX ORDER — PHENOBARBITAL 32.4 MG/1
97.2 TABLET ORAL NIGHTLY
Status: DISCONTINUED | OUTPATIENT
Start: 2022-05-21 | End: 2022-05-23 | Stop reason: HOSPADM

## 2022-05-21 RX ORDER — SODIUM CHLORIDE 9 MG/ML
INJECTION, SOLUTION INTRAVENOUS CONTINUOUS
Status: DISCONTINUED | OUTPATIENT
Start: 2022-05-21 | End: 2022-05-21

## 2022-05-21 RX ORDER — CHOLECALCIFEROL (VITAMIN D3) 25 MCG
1000 TABLET ORAL DAILY
Status: DISCONTINUED | OUTPATIENT
Start: 2022-05-21 | End: 2022-05-23 | Stop reason: HOSPADM

## 2022-05-21 RX ORDER — LEVOTHYROXINE SODIUM 125 UG/1
125 TABLET ORAL
Status: DISCONTINUED | OUTPATIENT
Start: 2022-05-21 | End: 2022-05-23 | Stop reason: HOSPADM

## 2022-05-21 RX ADMIN — PHENOBARBITAL 97.2 MG: 32.4 TABLET ORAL at 08:05

## 2022-05-21 RX ADMIN — METOPROLOL TARTRATE 25 MG: 25 TABLET, FILM COATED ORAL at 08:05

## 2022-05-21 RX ADMIN — LABETALOL HYDROCHLORIDE 10 MG: 5 INJECTION INTRAVENOUS at 10:05

## 2022-05-21 RX ADMIN — LEVOTHYROXINE SODIUM 125 MCG: 125 TABLET ORAL at 08:05

## 2022-05-21 RX ADMIN — OXCARBAZEPINE 150 MG: 150 TABLET, FILM COATED ORAL at 08:05

## 2022-05-21 RX ADMIN — Medication 148 ML: at 01:05

## 2022-05-21 RX ADMIN — SODIUM CHLORIDE 1000 ML: 0.9 INJECTION, SOLUTION INTRAVENOUS at 12:05

## 2022-05-21 RX ADMIN — METOPROLOL TARTRATE 25 MG: 25 TABLET, FILM COATED ORAL at 02:05

## 2022-05-21 RX ADMIN — DILTIAZEM HYDROCHLORIDE 10 MG: 5 INJECTION INTRAVENOUS at 01:05

## 2022-05-21 RX ADMIN — POLYETHYLENE GLYCOL 3350 17 G: 17 POWDER, FOR SOLUTION ORAL at 08:05

## 2022-05-21 RX ADMIN — LABETALOL HYDROCHLORIDE 10 MG: 5 INJECTION INTRAVENOUS at 04:05

## 2022-05-21 RX ADMIN — BISACODYL 10 MG: 10 SUPPOSITORY RECTAL at 02:05

## 2022-05-21 RX ADMIN — ACETAMINOPHEN 650 MG: 325 TABLET ORAL at 02:05

## 2022-05-21 RX ADMIN — CHOLECALCIFEROL TAB 25 MCG (1000 UNIT) 1000 UNITS: 25 TAB at 08:05

## 2022-05-21 RX ADMIN — DOCUSATE SODIUM 100 MG: 100 CAPSULE, LIQUID FILLED ORAL at 01:05

## 2022-05-21 RX ADMIN — SODIUM CHLORIDE: 0.9 INJECTION, SOLUTION INTRAVENOUS at 02:05

## 2022-05-21 RX ADMIN — PRAVASTATIN SODIUM 20 MG: 20 TABLET ORAL at 08:05

## 2022-05-21 RX ADMIN — Medication 400 MG: at 08:05

## 2022-05-21 RX ADMIN — DOCUSATE SODIUM 100 MG: 100 CAPSULE, LIQUID FILLED ORAL at 08:05

## 2022-05-21 NOTE — HPI
Annette Garcia is a 86-year-old female with a history of CAD, disorder of kidney and ureter, HTN, lone atrial fibrillation, seizures, hypothyroidism who presented to the ED from [facility] for evaluation of recurrent episodes of vomiting as well as nausea today. She has multiple medical comorbidities including ostomy. She notes that she has had some element of abdominal pain associated with the vomiting today.  he has not take anything that she knows of to help with the symptoms, she believe she has vomited at least 5 times reportedly 8. She can not recall any episodes like this in the past, nothing in particular seems to make it any better.       In the ED she vomited x8 given Zofran with relief. Per on call general surgery who will see in am. Hold NGT for vomiting. CT Abdomen/Pelvis: Single nonspecific dilated loop of small bowel without transition point.  Low-grade partial obstruction not excluded. Thickening of the wall of the distal esophagus with intraluminal contrast material concerning for reflux esophagitis. Further evaluation with EGD is recommended to exclude a neoplastic process. Tree-in-bud nodules in the right middle lobe and the lingula with mild bronchiectasis in the lingula. Findings are likely related to an atypical infectious process such as MAC. Admitted to [facility] for further evaluation.Admitted to [facility] for further care and bowel obstruction work up.

## 2022-05-21 NOTE — PHARMACY MED REC
"[facility]           Pharmacy  Admission Medication History     The home medication history was taken by Marcia Tabares.      Medication history obtained from Medications listed below were obtained from: Nursing home    Based on information gathered for medication list, you may go to "Admission" then "Reconcile Home Medications" tabs to review and/or act upon those items.      The home medication list has been updated by the Pharmacy department.    Please read ALL comments highlighted in yellow.    Please address this information as you see fit.     Feel free to contact us if you have any questions or require assistance.    The medications listed below were removed from the home medication list.  Please reorder if appropriate:     Patient reports NOT TAKING the following medication(s):  o vantin 100mg/5ml  o Flagyl 500mg  o remeron 7.5mg        No current facility-administered medications on file prior to encounter.     Current Outpatient Medications on File Prior to Encounter   Medication Sig Dispense Refill    acetaminophen (TYLENOL) 325 MG tablet Take 2 tablets (650 mg total) by mouth every 4 (four) hours as needed for Pain.  0    apixaban (ELIQUIS) 2.5 mg Tab Take 1 tablet (2.5 mg total) by mouth 2 (two) times daily.      calcium-vitamin D 600 mg(1,500mg) -400 unit Tab Take 1 tablet by mouth once daily.      levothyroxine (SYNTHROID) 125 MCG tablet TAKE 1 TABLET (125 MCG TOTAL) BY MOUTH ONCE DAILY. 90 tablet 3    magnesium oxide (MAG-OX) 400 mg (241.3 mg magnesium) tablet Take 2 tablets (800 mg total) by mouth once daily.  0    ondansetron (ZOFRAN) 8 MG tablet Take 8 mg by mouth every 6 (six) hours as needed for Nausea (and vomiting).      OXcarbazepine (TRILEPTAL) 300 MG Tab Take 150 mg by mouth nightly.      PHENobarbitaL (LUMINAL) 97.2 MG tablet Take 1 tablet (97.2 mg total) by mouth every evening. 30 tablet 5    phenoL (CHLORASEPTIC) 0.5 % SprA by Mucous Membrane route 2 " (two) times daily as needed (sore throat).      polyethylene glycol (GLYCOLAX) 17 gram PwPk Take 17 g by mouth 2 (two) times daily as needed (Constipation). (Patient taking differently: Take 17 g by mouth 2 (two) times daily as needed (Constipation). Mix in 6 ounces of water or liquid)  0    pravastatin (PRAVACHOL) 20 MG tablet Take 20 mg by mouth once daily.      vitamin D (VITAMIN D3) 1000 units Tab Take 1,000 Units by mouth once daily.      heparin flush, porcine, (HEPARIN FLUSH 10 UNITS/ML) 10 unit/mL injection 50 Units.      [DISCONTINUED] cefpodoxime (VANTIN) 100 mg/5 mL suspension Take 10 mLs (200 mg total) by mouth 2 (two) times a day. End date 9/19/20 200 mL 0    [DISCONTINUED] metroNIDAZOLE (FLAGYL) 500 MG tablet Take 1 tablet (500 mg total) by mouth every 8 (eight) hours. End date:9/19/20 (Patient not taking: Reported on 3/21/2022)      [DISCONTINUED] mirtazapine (REMERON) 7.5 MG Tab Take 1 tablet (7.5 mg total) by mouth every evening. 30 tablet 11    [DISCONTINUED] ondansetron (ZOFRAN-ODT) 4 MG TbDL Take 1 tablet (4 mg total) by mouth every 8 (eight) hours as needed.      [DISCONTINUED] OXcarbazepine (TRILEPTAL) 300 MG Tab Take 1 tablet (300 mg total) by mouth nightly. 90 tablet 3    [DISCONTINUED] pravastatin (PRAVACHOL) 20 MG tablet Take 1 tablet (20 mg total) by mouth once daily. 90 tablet 3       Please address this information as you see fit.  Feel free to contact us if you have any questions or require assistance.    Marcia Tabares  [phone]                  .

## 2022-05-21 NOTE — ASSESSMENT & PLAN NOTE
Slow transit constipation  Colostomy in place  From NH with N/V given zofran in ED with resolution for now  Ostomy site noted without stool or flatulence  CTAbd/pelvis: Single nonspecific dilated loop of small bowel without transition point. Low-grade partial obstruction not excluded. Thickening of the wall of the distal esophagus with intraluminal contrast material concerning for reflux esophagitis.  Further evaluation with EGD is recommended to exclude a neoplastic process. Tree-in-bud nodules in the right middle lobe and the lingula with mild bronchiectasis in the lingula.  Findings are likely related to an atypical infectious process such as MAC.  -NPO  -symptomatic management  -IVF  -replace electrolytes from vomiting  -appreciate GI recs-hold NGT for continuous vomiting

## 2022-05-21 NOTE — SIGNIFICANT EVENT
MET called due to elevated HR > 120and patient complained of chest pain.   Patient /97:   EKG- with new onset Afib with RVR- Cardizem 5 mg and repeat 5 mg. Start Metoprolol  CMP, troponin, mag  Consult cardiology        Critical Care Time     Critical care time was provided for 45 minutes exclusive of other billable procedures and teaching time for the support of cardiopulmonary organ system where the potential for death, shock, or further decline was possible. Critical care time can include documentation, discussion with consultants, developing a care plan, as well as direct patient care.

## 2022-05-21 NOTE — ED NOTES
Pt gown and linens changed. Purewick placed. Pt denies further needs at this time, will continue to monitor.

## 2022-05-21 NOTE — NURSING
MICHELL Arriola notified of bladder scan results 274. No orders given. Stated ok. Ordered IVFs NS at 100/hr per pump infusing. Pt denies any complaints. Will CTCM.

## 2022-05-21 NOTE — ASSESSMENT & PLAN NOTE
Slow transit constipation  Colostomy in place  From NH with N/V given zofran in ED with resolution for now  Ostomy site noted without stool or flatulence  CTAbd/pelvis: Single nonspecific dilated loop of small bowel without transition point. Low-grade partial obstruction not excluded. Thickening of the wall of the distal esophagus with intraluminal contrast material concerning for reflux esophagitis.  Further evaluation with EGD is recommended to exclude a neoplastic process. Tree-in-bud nodules in the right middle lobe and the lingula with mild bronchiectasis in the lingula.  Findings are likely related to an atypical infectious process such as MAC.  -NPO  -symptomatic management  -replace electrolytes from vomiting  -appreciate GI recs-hold NGT for continuous vomiting

## 2022-05-21 NOTE — NURSING
"RAPID RESPONSE NURSE NOTE     Admit Date: 2022  LOS: 0  Code Status: DNR   Date of Consult: 2022  : 1935  Age: 86 y.o.  Weight:   Wt Readings from Last 1 Encounters:   22 72.3 kg (159 lb 6.3 oz)     Sex: female  Race: White   [facility]:   MRN: [id number]  Time Rapid Response Team page Received: 1342  Time Rapid Response Team at Bedside: 1345  Time Rapid Response Team left Bedside: 1406  Was the patient discharged from an ICU this admission?   no  Was the patient discharged from a PACU within last 24 hours?  no  Did the patient receive conscious sedation/general anesthesia within last 24 hours?  no  Was the patient in the ED within the past 24 hours?  yes  Was the patient started on NIPPV within the past 24 hours?  no  Did this progress into an ARC or CPA:  no  Attending Physician: Alice Barker*  Primary Service: Networked reference to record PCT   Consult Requested By: Alice Barker*     SITUATION     Reason for Call: rapid heart heart rate, nausea, hypertension  Called to evaluate the patient for Dysrythmia    BACKGROUND     Why is the patient in the hospital?: Partial small bowel obstruction    Patient has a past medical history of Allergy, Cancer, Coronary artery disease, Disorder of kidney and ureter, E coli bacteremia, Hypertension, Lone atrial fibrillation, Petit mal epilepsy, Scoliosis of lumbar spine, Seizures, and Unspecified hypothyroidism.    ASSESSMENT/INTERVENTIONS     BP (!) 191/77   Pulse (!) 122   Temp 98.1 °F (36.7 °C)   Resp 20   Ht 4' 10" (1.473 m)   Wt 72.3 kg (159 lb 6.3 oz)   LMP  (LMP Unknown)   SpO2 98%   Breastfeeding No   BMI 33.31 kg/m²     What did you find: HR in the 130s, BP 150s/80s, patient complaining of chest tightness    RECOMMENDATIONS     We recommend: cardizem push, restarting PO antihypertensives    FOLLOW-UP/CONTINGENCY PLAN     Patient needs a second visit at : n/a    Call the Rapid Response Nurse, Jose Rodriguez RN " at x [phone] for additional questions or concerns.    PHYSICIAN ESCALATION     Orders received and case discussed with Dr. Bauer.    Disposition: Remain in room 528.

## 2022-05-21 NOTE — PLAN OF CARE
VN phoned into room to complete admit assessment. VIP model introduced; VN working alongside bedside treatment team.  Plan of care reviewed with patient. Patient informed of fall risk, fall precautions, call light within reach, side rails x2 elevated. Patient notified to ask staff for assistance. Patient verbalized complete understanding. Time allowed for questions. Will continue to monitor and intervene as needed. Chart review complete.

## 2022-05-21 NOTE — CARE UPDATE
Admitted with partial bowel obstruction.   On exam -surrounding erythema around the colostomy bag.  Updated david Spear over the phone. She noted patient is wheelchair/ bed bound, requires assistance with all ADLs  Resume home Trileptal and Phenobarbital.   Awaits surgery evaluation- rec's

## 2022-05-21 NOTE — NURSING
1315pm Patient's HR sustaining at 120's bumps to 140's for few seconds, per tele tech pt's rhythm showing sinus tachycardia, pt is asymptomatic. Dr Bauer informed and ordered for 12 lead EKG STAT, RT called 2x.   1342pm Patient c/o chest pain and headache. Vital signs taken, MET called.  EKG showing new onset of afib. Dr Bauer at bedside. Cardizem 5mg x2 doses given. Labs ordered. Cardiology consult placed. Patient verbalizes relief from chest pain and the MET was ended at 1406.

## 2022-05-21 NOTE — H&P
[facility] Medicine  History & Physical    Patient Name: Annette Garcia  MRN: [id number]  Patient Class: OP- Observation  Admission Date: 5/20/2022  Attending Physician: Ahmet Holly MD   Primary Care Provider: Jose Valles MD         Patient information was obtained from patient, past medical records and ER records.     Subjective:     Principal Problem:Partial small bowel obstruction    Chief Complaint:   Chief Complaint   Patient presents with    Vomiting     Pt presents to ED today from [facility] c/o vomiting, upon EMS arrival o2 sat 86 % on room air. Pt placed on 4L via NC o2 sst increased to 96 %   /90        HPI: Annette Garcia is a 86-year-old female with a history of CAD, disorder of kidney and ureter, HTN, lone atrial fibrillation, seizures, hypothyroidism who presented to the ED from [facility] for evaluation of recurrent episodes of vomiting as well as nausea today. She has multiple medical comorbidities including ostomy. She notes that she has had some element of abdominal pain associated with the vomiting today.  he has not take anything that she knows of to help with the symptoms, she believe she has vomited at least 5 times reportedly 8. She can not recall any episodes like this in the past, nothing in particular seems to make it any better.       In the ED she vomited x8 given Zofran with relief. Per on call general surgery who will see in am. Hold NGT for vomiting. CT Abdomen/Pelvis: Single nonspecific dilated loop of small bowel without transition point.  Low-grade partial obstruction not excluded. Thickening of the wall of the distal esophagus with intraluminal contrast material concerning for reflux esophagitis. Further evaluation with EGD is recommended to exclude a neoplastic process. Tree-in-bud nodules in the right middle lobe and the lingula with mild bronchiectasis in the lingula. Findings are likely related to an atypical  infectious process such as MAC. Admitted to [facility] for further evaluation.Admitted to [facility] for further care and bowel obstruction work up.      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    In the setting of septic shock and near death    Petit mal epilepsy 1954    Scoliosis of lumbar spine     Seizures     Unspecified hypothyroidism        Past Surgical History:   Procedure Laterality Date    APPENDECTOMY      BACK SURGERY  1988    vertebral fracture    BACK SURGERY  02/2013    lumbar L2-5    CATARACT EXTRACTION, BILATERAL Bilateral     CHOLECYSTECTOMY      open    EYE SURGERY Bilateral     cataract removal with lens implant    HYSTERECTOMY      PERCUTANEOUS TRANSLUMINAL ANGIOPLASTY (PTA) OF PERIPHERAL VESSEL N/A 2/3/2020    Procedure: PTA, PERIPHERAL VESSEL;  Surgeon: Timmy Simmons MD;  Location: [facility] CATH LAB/EP;  Service: Cardiology;  Laterality: N/A;    PORTACATH PLACEMENT Right 09/2016    RENAL ARTERY STENT Left 07/19/2017    SIGMOIDECTOMY  10/29/2019    SMALL INTESTINE SURGERY  08/23/2016    THROMBECTOMY N/A 2/3/2020    Procedure: THROMBECTOMY;  Surgeon: Timmy Simmons MD;  Location: [facility] CATH LAB/EP;  Service: Cardiology;  Laterality: N/A;    TONSILLECTOMY      VAGINAL HYSTERECTOMY W/ ANTERIOR AND POSTERIOR VAGINAL REPAIR         Review of patient's allergies indicates:   Allergen Reactions    Adhesive Itching and Blisters    Penicillins Anaphylaxis    Tramadol Hives    Avelox [moxifloxacin] Rash     Facial and arm itching and redness. Pt states throat closes when given.    Amoxil [amoxicillin]     Aspridrox [aspirin, buffered]     Codeine Other (See Comments)     Throat swelling    Keflex [cephalexin]      Tolerated cefepime and cefazolin     Norvasc [amlodipine]     Red dye Hives    Robitussin [guaifenesin]     Sulfa (sulfonamide antibiotics)     Tylenol [acetaminophen]      Has reaction to Tylenol with red dye and unable to take Extra Strength Tylenol/ CAN ONLY TOLERATE REG STRENGTH TYLENOL    Vicks vaporub [camphor-eucalyptus oil-menthol]        No current facility-administered medications on file prior to encounter.     Current Outpatient Medications on File Prior to Encounter   Medication Sig    acetaminophen (TYLENOL) 325 MG tablet Take 2 tablets (650 mg total) by mouth every 4 (four) hours as needed for Pain.    apixaban (ELIQUIS) 2.5 mg Tab Take 1 tablet (2.5 mg total) by mouth 2 (two) times daily.    calcium-vitamin D 600 mg(1,500mg) -400 unit Tab Take 1 tablet by mouth once daily.    levothyroxine (SYNTHROID) 125 MCG tablet TAKE 1 TABLET (125 MCG TOTAL) BY MOUTH ONCE DAILY.    magnesium oxide (MAG-OX) 400 mg (241.3 mg magnesium) tablet Take 2 tablets (800 mg total) by mouth once daily.    ondansetron (ZOFRAN) 8 MG tablet Take 8 mg by mouth every 6 (six) hours as needed for Nausea (and vomiting).    OXcarbazepine (TRILEPTAL) 300 MG Tab Take 150 mg by mouth nightly.    PHENobarbitaL (LUMINAL) 97.2 MG tablet Take 1 tablet (97.2 mg total) by mouth every evening.    phenoL (CHLORASEPTIC) 0.5 % SprA by Mucous Membrane route 2 (two) times daily as needed (sore throat).    polyethylene glycol (GLYCOLAX) 17 gram PwPk Take 17 g by mouth 2 (two) times daily as needed (Constipation). (Patient taking differently: Take 17 g by mouth 2 (two) times daily as needed (Constipation). Mix in 6 ounces of water or liquid)    pravastatin (PRAVACHOL) 20 MG tablet Take 20 mg by mouth once daily.    vitamin D (VITAMIN D3) 1000 units Tab Take 1,000 Units by mouth once daily.    heparin flush, porcine, (HEPARIN FLUSH 10 UNITS/ML) 10 unit/mL injection 50 Units.     Family History       Problem Relation (Age of Onset)    Heart attack Father    Hypertension  Father    Pancreatic cancer Mother          Tobacco Use    Smoking status: Never Smoker    Smokeless tobacco: Never Used   Substance and Sexual Activity    Alcohol use: No    Drug use: No    Sexual activity: Not on file     Review of Systems   Constitutional: Negative.    HENT: Negative.     Eyes: Negative.    Respiratory: Negative.     Cardiovascular: Negative.    Gastrointestinal:  Positive for abdominal pain, nausea and vomiting.   Endocrine: Negative.    Genitourinary: Negative.    Musculoskeletal: Negative.    Skin: Negative.    Neurological: Negative.    Psychiatric/Behavioral: Negative.     Objective:     Vital Signs (Most Recent):  Temp: 98.2 °F (36.8 °C) (05/21/22 0423)  Pulse: 91 (05/21/22 0423)  Resp: 19 (05/21/22 0423)  BP: (!) 149/65 (05/21/22 0423)  SpO2: 96 % (05/21/22 0423)   Vital Signs (24h Range):  Temp:  [97.8 °F (36.6 °C)-98.5 °F (36.9 °C)] 98.2 °F (36.8 °C)  Pulse:  [] 91  Resp:  [17-20] 19  SpO2:  [95 %-98 %] 96 %  BP: (149-181)/(65-90) 149/65     Weight: 72.3 kg (159 lb 6.3 oz)  Body mass index is 33.31 kg/m².    Physical Exam  Vitals and nursing note reviewed.   Constitutional:       General: She is not in acute distress.     Appearance: Normal appearance. She is not ill-appearing or toxic-appearing.   HENT:      Head: Normocephalic and atraumatic.      Mouth/Throat:      Mouth: Mucous membranes are dry.   Eyes:      Extraocular Movements: Extraocular movements intact.      Pupils: Pupils are equal, round, and reactive to light.   Cardiovascular:      Rate and Rhythm: Normal rate and regular rhythm.      Pulses: Normal pulses.      Heart sounds: Normal heart sounds.   Pulmonary:      Effort: Pulmonary effort is normal. No respiratory distress.      Breath sounds: Normal breath sounds. No wheezing.   Abdominal:      General: Bowel sounds are normal. There is no distension.      Tenderness: There is no abdominal tenderness.   Musculoskeletal:         General: Swelling present.  Normal range of motion.   Skin:     General: Skin is warm and dry.      Capillary Refill: Capillary refill takes 2 to 3 seconds.      Findings: Erythema present.      Comments: See photo below  Seems to be excoriation from ostomy pouch   Neurological:      Mental Status: She is alert and oriented to person, place, and time. Mental status is at baseline.   Psychiatric:         Mood and Affect: Mood normal.         Behavior: Behavior normal.           CRANIAL NERVES     CN III, IV, VI   Pupils are equal, round, and reactive to light.     Significant Labs: All pertinent labs within the past 24 hours have been reviewed.  Recent Lab Results         05/21/22  0017   05/20/22 2129 05/20/22 2046        Albumin   4.3         Alkaline Phosphatase   121         ALT   25         Anion Gap   20         AST   23  Comment: Specimen slightly hemolyzed         Baso #     0.02       Basophil %     0.1       BILIRUBIN TOTAL   0.5  Comment: For infants and newborns, interpretation of results should be based  on gestational age, weight and in agreement with clinical  observations.    Premature Infant recommended reference ranges:  Up to 24 hours.............<8.0 mg/dL  Up to 48 hours............<12.0 mg/dL  3-5 days..................<15.0 mg/dL  6-29 days.................<15.0 mg/dL           BUN   16         Calcium   10.2         Chloride   88         CO2   29         Creatinine   0.9         Differential Method     Automated       eGFR if    >60         eGFR if non    59  Comment: Calculation used to obtain the estimated glomerular filtration  rate (eGFR) is the CKD-EPI equation.            Eos #     0.0       Eosinophil %     0.0       Glucose   155         Gran # (ANC)     14.1       Gran %     87.5       Hematocrit     41.1       Hemoglobin     13.7       Immature Grans (Abs)     0.06  Comment: Mild elevation in immature granulocytes is non specific and   can be seen in a variety of conditions  including stress response,   acute inflammation, trauma and pregnancy. Correlation with other   laboratory and clinical findings is essential.         Immature Granulocytes     0.4       Lactate, Jim     2.0  Comment: Falsely low lactic acid results can be found in samples   containing >=13.0 mg/dL total bilirubin and/or >=3.5 mg/dL   direct bilirubin.         Lipase   3         Lymph #     1.1       Lymph %     6.6       Magnesium   1.9         MCH     32.5       MCHC     33.3       MCV     97       Mono #     0.9       Mono %     5.4       MPV     10.3       nRBC     0       Platelets     233       Potassium   4.4         PROTEIN TOTAL   8.0          Acceptable Yes           RBC     4.22       RDW     13.6       SARS-CoV-2 RNA, Amplification, Qual Negative  Comment: .rapidabbott           Sodium   137         WBC     16.14               Significant Imaging: I have reviewed all pertinent imaging results/findings within the past 24 hours.  I have reviewed and interpreted all pertinent imaging results/findings within the past 24 hours.    Assessment/Plan:     * Partial small bowel obstruction  Slow transit constipation  Colostomy in place  From NH with N/V given zofran in ED with resolution for now  Ostomy site noted without stool or flatulence  CTAbd/pelvis: Single nonspecific dilated loop of small bowel without transition point. Low-grade partial obstruction not excluded. Thickening of the wall of the distal esophagus with intraluminal contrast material concerning for reflux esophagitis.  Further evaluation with EGD is recommended to exclude a neoplastic process. Tree-in-bud nodules in the right middle lobe and the lingula with mild bronchiectasis in the lingula.  Findings are likely related to an atypical infectious process such as MAC.  -NPO  -symptomatic management  -IVF  -replace electrolytes from vomiting  -appreciate GI recs-hold NGT for continuous vomiting    Leukocytosis  WBC 16  Lactate  normal  No indications of infection noted  UA pending      Chronic anemia  Stable  monitor      Paroxysmal atrial fibrillation  Chronic anticoagulation  Takes Eliquis  Resume and monitor    Urinary retention  Patient has not voided since admission  bladder scan with 74 now with 200  Will give patient a chance to urinate on her one with out the help of gonzales or I & out cath  Monitor strict I/Os      Peripheral vascular disease, unspecified  Resume Statin       Anemia due to antineoplastic chemotherapy  Stable  monitor      Acquired hypothyroidism  Resume home meds      Epilepsy  seizure precautions  Trileptal level pending  phenobarbital level pending    Renovascular hypertension  History of stent insertion of left renal artery 7/19/17  Bilateral renal artery stenosis  Hypertensive on admission  Takes statin at home and denies BP med use-resume and monitor    VTE Risk Mitigation (From admission, onward)         Ordered     apixaban tablet 2.5 mg  2 times daily         05/21/22 0600     IP VTE HIGH RISK PATIENT  Once         05/21/22 0008     Place sequential compression device  Until discontinued         05/21/22 0008                   Zeinab Dodd DNP, AGACNP-BC  Hospitalist   Department of Hospital Medicine   [facility]   Office #: [phone]

## 2022-05-21 NOTE — ASSESSMENT & PLAN NOTE
Patient has not voided since admission  bladder scan with 74 now with 200  Will give patient a chance to urinate on her one with out the help of gonzales or I & out cath  Monitor strict I/Os

## 2022-05-21 NOTE — CONSULTS
Patient ID: Annette Garcia is a 86 y.o. female.    Chief Complaint: Vomiting (Pt presents to ED today from [facility] c/o vomiting, upon EMS arrival o2 sat 86 % on room air. Pt placed on 4L via NC o2 sst increased to 96 % //90)      HPI:  HPI  86F known to me for partial colectomy with end colostomy [id number]. Had been doing well since then until yesterday. She vomited numerous times. Seems that she has had some pain? unclera if she has had these episdoes in the past. Minimal stool in bag since admit. Denies nausea now.     Review of Systems   Constitutional: Negative for fever.   HENT: Negative for trouble swallowing.    Respiratory: Negative for shortness of breath.    Cardiovascular: Negative for chest pain.   Gastrointestinal: Positive for abdominal pain, constipation, nausea and vomiting. Negative for blood in stool.   Genitourinary: Negative for dysuria.   Musculoskeletal: Positive for gait problem.   Skin: Negative for rash and wound.   Allergic/Immunologic: Negative for immunocompromised state.   Neurological: Negative for weakness.   Hematological: Does not bruise/bleed easily.   Psychiatric/Behavioral: Negative for agitation.       Current Facility-Administered Medications   Medication Dose Route Frequency Provider Last Rate Last Admin    acetaminophen tablet 650 mg  650 mg Oral Q4H PRN Zeinab Dodd NP        albuterol-ipratropium 2.5 mg-0.5 mg/3 mL nebulizer solution 3 mL  3 mL Nebulization Q4H PRN Zeinab Dodd NP        docusate sodium capsule 100 mg  100 mg Oral BID Chris Johnson MD        glucagon (human recombinant) injection 1 mg  1 mg Intramuscular PRN Zeinab Dodd NP        glucose chewable tablet 16 g  16 g Oral PRN Zeinab Dodd NP        glucose chewable tablet 24 g  24 g Oral PRN Zeinab Dodd NP        labetaloL injection 10 mg  10 mg Intravenous Q6H PRN Alice Barker MD   10 mg at 05/21/22 1040    levothyroxine  tablet 125 mcg  125 mcg Oral Before breakfast Zeinab Dodd NP   125 mcg at 05/21/22 0851    magnesium citrate solution 148 mL  148 mL Oral Once Chris Johnson MD        melatonin tablet 6 mg  6 mg Oral Nightly PRN Zeinab Dodd NP        metoprolol tartrate (LOPRESSOR) tablet 25 mg  25 mg Oral BID Alice Barker MD        naloxone 0.4 mg/mL injection 0.02 mg  0.02 mg Intravenous PRN Zeinab Dodd NP        OXcarbazepine tablet 150 mg  150 mg Oral Nightly Alice Barker MD        PHENobarbitaL tablet 97.2 mg  97.2 mg Oral QHS Alice Barker MD        polyethylene glycol packet 17 g  17 g Oral Daily PRN Zeinab Dodd NP        polyethylene glycol packet 17 g  17 g Oral BID Alice Barker MD        pravastatin tablet 20 mg  20 mg Oral Daily Zeinab Dodd NP   20 mg at 05/21/22 0851    vitamin D 1000 units tablet 1,000 Units  1,000 Units Oral Daily Zeinab Dodd NP   1,000 Units at 05/21/22 0850       Review of patient's allergies indicates:   Allergen Reactions    Adhesive Itching and Blisters    Penicillins Anaphylaxis    Tramadol Hives    Avelox [moxifloxacin] Rash     Facial and arm itching and redness. Pt states throat closes when given.    Amoxil [amoxicillin]     Aspridrox [aspirin, buffered]     Codeine Other (See Comments)     Throat swelling    Keflex [cephalexin]      Tolerated cefepime and cefazolin    Norvasc [amlodipine]     Red dye Hives    Robitussin [guaifenesin]     Sulfa (sulfonamide antibiotics)     Tylenol [acetaminophen]      Has reaction to Tylenol with red dye and unable to take Extra Strength Tylenol/ CAN ONLY TOLERATE REG STRENGTH TYLENOL    Vicks vaporub [camphor-eucalyptus oil-menthol]        Past Medical History:   Diagnosis Date    Allergy     Cancer     colon    Coronary artery disease 8/14/2020    Disorder of kidney and ureter     E coli bacteremia 10/29/2019     Hypertension     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    In the setting of septic shock and near death    Petit mal epilepsy 1954    Scoliosis of lumbar spine     Seizures     Unspecified hypothyroidism        Past Surgical History:   Procedure Laterality Date    APPENDECTOMY      BACK SURGERY  1988    vertebral fracture    BACK SURGERY  02/2013    lumbar L2-5    CATARACT EXTRACTION, BILATERAL Bilateral     CHOLECYSTECTOMY      open    EYE SURGERY Bilateral     cataract removal with lens implant    HYSTERECTOMY      PERCUTANEOUS TRANSLUMINAL ANGIOPLASTY (PTA) OF PERIPHERAL VESSEL N/A 2/3/2020    Procedure: PTA, PERIPHERAL VESSEL;  Surgeon: Timmy Simmons MD;  Location: [facility] CATH LAB/EP;  Service: Cardiology;  Laterality: N/A;    PORTACATH PLACEMENT Right 09/2016    RENAL ARTERY STENT Left 07/19/2017    SIGMOIDECTOMY  10/29/2019    SMALL INTESTINE SURGERY  08/23/2016    THROMBECTOMY N/A 2/3/2020    Procedure: THROMBECTOMY;  Surgeon: Timmy Simmons MD;  Location: [facility] CATH LAB/EP;  Service: Cardiology;  Laterality: N/A;    TONSILLECTOMY      VAGINAL HYSTERECTOMY W/ ANTERIOR AND POSTERIOR VAGINAL REPAIR         Family History   Problem Relation Age of Onset    Pancreatic cancer Mother     Hypertension Father     Heart attack Father        Social History     Socioeconomic History    Marital status:    Tobacco Use    Smoking status: Never Smoker    Smokeless tobacco: Never Used   Substance and Sexual Activity    Alcohol use: No    Drug use: No       Vitals:    05/21/22 1300   BP: 135/79   Pulse: (!) 121   Resp:    Temp:        Physical Exam  Constitutional:       General: She is not in acute distress.     Appearance: She is well-developed.   HENT:      Head: Normocephalic and atraumatic.   Eyes:      General: No scleral icterus.  Cardiovascular:      Rate and Rhythm: Normal rate.   Pulmonary:      Effort: Pulmonary effort is normal.      Breath sounds: No stridor.   Abdominal:       General: There is no distension.      Palpations: Abdomen is soft.      Tenderness: There is no abdominal tenderness.      Comments: Small amount of stool in bag  Ab soft   Lymphadenopathy:      Cervical: No cervical adenopathy.   Skin:     General: Skin is warm.      Findings: No erythema.   Neurological:      Mental Status: She is alert and oriented to person, place, and time.   Psychiatric:         Behavior: Behavior normal.       Wbc 15  Hg 14  Lytes good  gfr good  CT mildly dilated loop of small bowel without transition. Large amount of stool in colon.      Assessment & Plan:   86F with nausea, vomiting. Doubt mechanical bowel obstruction  Contrast seen in colon on xr this morning  Mag citrate slowly  Ok for ice chips   colace  Hold eliquis

## 2022-05-21 NOTE — ED NOTES
Pt has not urinated since arrival to ED, pt refusing catheter. Bladder scan showed 74ml. Pt brief she was wearing upon arrival was clean and dry. No urine output noted with purewick and new brief is clean and dry.

## 2022-05-21 NOTE — ED TRIAGE NOTES
Pt presents to ED via EMS from [facility] with complaints of nausea and vomiting that began today.

## 2022-05-21 NOTE — ASSESSMENT & PLAN NOTE
History of stent insertion of left renal artery 7/19/17  Bilateral renal artery stenosis  Hypertensive on admission  Takes statin at home and denies BP med use-resume and monitor

## 2022-05-21 NOTE — NURSING
Ordered bilateral SCDs placed. Bolus continued from ED.pt wide awake, informed of NPO status, pt voice complete understanding, stated she will comply. Redness noted to colostomy site. ZeinabNP informed, also informed pt has not voided yet, just arrived from ER. No orders given. Will CTCM.

## 2022-05-21 NOTE — SUBJECTIVE & OBJECTIVE
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    In the setting of septic shock and near death    Petit mal epilepsy 1954    Scoliosis of lumbar spine     Seizures     Unspecified hypothyroidism        Past Surgical History:   Procedure Laterality Date    APPENDECTOMY      BACK SURGERY  1988    vertebral fracture    BACK SURGERY  02/2013    lumbar L2-5    CATARACT EXTRACTION, BILATERAL Bilateral     CHOLECYSTECTOMY      open    EYE SURGERY Bilateral     cataract removal with lens implant    HYSTERECTOMY      PERCUTANEOUS TRANSLUMINAL ANGIOPLASTY (PTA) OF PERIPHERAL VESSEL N/A 2/3/2020    Procedure: PTA, PERIPHERAL VESSEL;  Surgeon: Timmy Simmons MD;  Location: [facility] CATH LAB/EP;  Service: Cardiology;  Laterality: N/A;    PORTACATH PLACEMENT Right 09/2016    RENAL ARTERY STENT Left 07/19/2017    SIGMOIDECTOMY  10/29/2019    SMALL INTESTINE SURGERY  08/23/2016    THROMBECTOMY N/A 2/3/2020    Procedure: THROMBECTOMY;  Surgeon: Timmy Simmons MD;  Location: [facility] CATH LAB/EP;  Service: Cardiology;  Laterality: N/A;    TONSILLECTOMY      VAGINAL HYSTERECTOMY W/ ANTERIOR AND POSTERIOR VAGINAL REPAIR         Review of patient's allergies indicates:   Allergen Reactions    Adhesive Itching and Blisters    Penicillins Anaphylaxis    Tramadol Hives    Avelox [moxifloxacin] Rash     Facial and arm itching and redness. Pt states throat closes when given.    Amoxil [amoxicillin]     Aspridrox [aspirin, buffered]     Codeine Other (See Comments)     Throat swelling    Keflex [cephalexin]      Tolerated cefepime and cefazolin    Norvasc [amlodipine]     Red dye Hives    Robitussin [guaifenesin]     Sulfa (sulfonamide antibiotics)     Tylenol [acetaminophen]      Has reaction to Tylenol with red dye and unable to take Extra Strength Tylenol/ CAN  ONLY TOLERATE REG STRENGTH TYLENOL    Vicks vaporub [camphor-eucalyptus oil-menthol]        No current facility-administered medications on file prior to encounter.     Current Outpatient Medications on File Prior to Encounter   Medication Sig    acetaminophen (TYLENOL) 325 MG tablet Take 2 tablets (650 mg total) by mouth every 4 (four) hours as needed for Pain.    apixaban (ELIQUIS) 2.5 mg Tab Take 1 tablet (2.5 mg total) by mouth 2 (two) times daily.    calcium-vitamin D 600 mg(1,500mg) -400 unit Tab Take 1 tablet by mouth once daily.    levothyroxine (SYNTHROID) 125 MCG tablet TAKE 1 TABLET (125 MCG TOTAL) BY MOUTH ONCE DAILY.    magnesium oxide (MAG-OX) 400 mg (241.3 mg magnesium) tablet Take 2 tablets (800 mg total) by mouth once daily.    ondansetron (ZOFRAN) 8 MG tablet Take 8 mg by mouth every 6 (six) hours as needed for Nausea (and vomiting).    OXcarbazepine (TRILEPTAL) 300 MG Tab Take 150 mg by mouth nightly.    PHENobarbitaL (LUMINAL) 97.2 MG tablet Take 1 tablet (97.2 mg total) by mouth every evening.    phenoL (CHLORASEPTIC) 0.5 % SprA by Mucous Membrane route 2 (two) times daily as needed (sore throat).    polyethylene glycol (GLYCOLAX) 17 gram PwPk Take 17 g by mouth 2 (two) times daily as needed (Constipation). (Patient taking differently: Take 17 g by mouth 2 (two) times daily as needed (Constipation). Mix in 6 ounces of water or liquid)    pravastatin (PRAVACHOL) 20 MG tablet Take 20 mg by mouth once daily.    vitamin D (VITAMIN D3) 1000 units Tab Take 1,000 Units by mouth once daily.    heparin flush, porcine, (HEPARIN FLUSH 10 UNITS/ML) 10 unit/mL injection 50 Units.     Family History       Problem Relation (Age of Onset)    Heart attack Father    Hypertension Father    Pancreatic cancer Mother          Tobacco Use    Smoking status: Never Smoker    Smokeless tobacco: Never Used   Substance and Sexual Activity    Alcohol use: No    Drug use: No    Sexual activity: Not on file     Review of  Systems   Constitutional: Negative.    HENT: Negative.     Eyes: Negative.    Respiratory: Negative.     Cardiovascular: Negative.    Gastrointestinal:  Positive for abdominal pain, nausea and vomiting.   Endocrine: Negative.    Genitourinary: Negative.    Musculoskeletal: Negative.    Skin: Negative.    Neurological: Negative.    Psychiatric/Behavioral: Negative.     Objective:     Vital Signs (Most Recent):  Temp: 98.2 °F (36.8 °C) (05/21/22 0423)  Pulse: 91 (05/21/22 0423)  Resp: 19 (05/21/22 0423)  BP: (!) 149/65 (05/21/22 0423)  SpO2: 96 % (05/21/22 0423)   Vital Signs (24h Range):  Temp:  [97.8 °F (36.6 °C)-98.5 °F (36.9 °C)] 98.2 °F (36.8 °C)  Pulse:  [] 91  Resp:  [17-20] 19  SpO2:  [95 %-98 %] 96 %  BP: (149-181)/(65-90) 149/65     Weight: 72.3 kg (159 lb 6.3 oz)  Body mass index is 33.31 kg/m².    Physical Exam  Vitals and nursing note reviewed.   Constitutional:       General: She is not in acute distress.     Appearance: Normal appearance. She is not ill-appearing or toxic-appearing.   HENT:      Head: Normocephalic and atraumatic.      Mouth/Throat:      Mouth: Mucous membranes are dry.   Eyes:      Extraocular Movements: Extraocular movements intact.      Pupils: Pupils are equal, round, and reactive to light.   Cardiovascular:      Rate and Rhythm: Normal rate and regular rhythm.      Pulses: Normal pulses.      Heart sounds: Normal heart sounds.   Pulmonary:      Effort: Pulmonary effort is normal. No respiratory distress.      Breath sounds: Normal breath sounds. No wheezing.   Abdominal:      General: Bowel sounds are normal. There is no distension.      Tenderness: There is no abdominal tenderness.   Musculoskeletal:         General: Swelling present. Normal range of motion.   Skin:     General: Skin is warm and dry.      Capillary Refill: Capillary refill takes 2 to 3 seconds.      Findings: Erythema present.      Comments: See photo below  Seems to be excoriation from ostomy pouch    Neurological:      Mental Status: She is alert and oriented to person, place, and time. Mental status is at baseline.   Psychiatric:         Mood and Affect: Mood normal.         Behavior: Behavior normal.           CRANIAL NERVES     CN III, IV, VI   Pupils are equal, round, and reactive to light.     Significant Labs: All pertinent labs within the past 24 hours have been reviewed.  Recent Lab Results         05/21/22  0017   05/20/22 2129   05/20/22  2046        Albumin   4.3         Alkaline Phosphatase   121         ALT   25         Anion Gap   20         AST   23  Comment: Specimen slightly hemolyzed         Baso #     0.02       Basophil %     0.1       BILIRUBIN TOTAL   0.5  Comment: For infants and newborns, interpretation of results should be based  on gestational age, weight and in agreement with clinical  observations.    Premature Infant recommended reference ranges:  Up to 24 hours.............<8.0 mg/dL  Up to 48 hours............<12.0 mg/dL  3-5 days..................<15.0 mg/dL  6-29 days.................<15.0 mg/dL           BUN   16         Calcium   10.2         Chloride   88         CO2   29         Creatinine   0.9         Differential Method     Automated       eGFR if    >60         eGFR if non    59  Comment: Calculation used to obtain the estimated glomerular filtration  rate (eGFR) is the CKD-EPI equation.            Eos #     0.0       Eosinophil %     0.0       Glucose   155         Gran # (ANC)     14.1       Gran %     87.5       Hematocrit     41.1       Hemoglobin     13.7       Immature Grans (Abs)     0.06  Comment: Mild elevation in immature granulocytes is non specific and   can be seen in a variety of conditions including stress response,   acute inflammation, trauma and pregnancy. Correlation with other   laboratory and clinical findings is essential.         Immature Granulocytes     0.4       Lactate, Jim     2.0  Comment: Falsely low lactic  acid results can be found in samples   containing >=13.0 mg/dL total bilirubin and/or >=3.5 mg/dL   direct bilirubin.         Lipase   3         Lymph #     1.1       Lymph %     6.6       Magnesium   1.9         MCH     32.5       MCHC     33.3       MCV     97       Mono #     0.9       Mono %     5.4       MPV     10.3       nRBC     0       Platelets     233       Potassium   4.4         PROTEIN TOTAL   8.0          Acceptable Yes           RBC     4.22       RDW     13.6       SARS-CoV-2 RNA, Amplification, Qual Negative  Comment: .rapidabbott           Sodium   137         WBC     16.14               Significant Imaging: I have reviewed all pertinent imaging results/findings within the past 24 hours.  I have reviewed and interpreted all pertinent imaging results/findings within the past 24 hours.

## 2022-05-22 PROBLEM — N39.0 UTI (URINARY TRACT INFECTION): Status: ACTIVE | Noted: 2022-05-22

## 2022-05-22 LAB
ALBUMIN SERPL BCP-MCNC: 3.5 G/DL (ref 3.5–5.2)
ALP SERPL-CCNC: 93 U/L (ref 55–135)
ALT SERPL W/O P-5'-P-CCNC: 30 U/L (ref 10–44)
ANION GAP SERPL CALC-SCNC: 13 MMOL/L (ref 8–16)
AST SERPL-CCNC: 25 U/L (ref 10–40)
BASOPHILS # BLD AUTO: 0.02 K/UL (ref 0–0.2)
BASOPHILS NFR BLD: 0.1 % (ref 0–1.9)
BILIRUB SERPL-MCNC: 0.7 MG/DL (ref 0.1–1)
BUN SERPL-MCNC: 18 MG/DL (ref 8–23)
CALCIUM SERPL-MCNC: 9 MG/DL (ref 8.7–10.5)
CHLORIDE SERPL-SCNC: 93 MMOL/L (ref 95–110)
CO2 SERPL-SCNC: 31 MMOL/L (ref 23–29)
CREAT SERPL-MCNC: 0.7 MG/DL (ref 0.5–1.4)
DIFFERENTIAL METHOD: ABNORMAL
EOSINOPHIL # BLD AUTO: 0 K/UL (ref 0–0.5)
EOSINOPHIL NFR BLD: 0.1 % (ref 0–8)
ERYTHROCYTE [DISTWIDTH] IN BLOOD BY AUTOMATED COUNT: 13.7 % (ref 11.5–14.5)
EST. GFR  (AFRICAN AMERICAN): >60 ML/MIN/1.73 M^2
EST. GFR  (NON AFRICAN AMERICAN): >60 ML/MIN/1.73 M^2
GLUCOSE SERPL-MCNC: 121 MG/DL (ref 70–110)
HCT VFR BLD AUTO: 33.6 % (ref 37–48.5)
HGB BLD-MCNC: 11 G/DL (ref 12–16)
IMM GRANULOCYTES # BLD AUTO: 0.06 K/UL (ref 0–0.04)
IMM GRANULOCYTES NFR BLD AUTO: 0.4 % (ref 0–0.5)
LYMPHOCYTES # BLD AUTO: 1.7 K/UL (ref 1–4.8)
LYMPHOCYTES NFR BLD: 10.8 % (ref 18–48)
MCH RBC QN AUTO: 32.3 PG (ref 27–31)
MCHC RBC AUTO-ENTMCNC: 32.7 G/DL (ref 32–36)
MCV RBC AUTO: 99 FL (ref 82–98)
MONOCYTES # BLD AUTO: 1.1 K/UL (ref 0.3–1)
MONOCYTES NFR BLD: 7.2 % (ref 4–15)
NEUTROPHILS # BLD AUTO: 12.5 K/UL (ref 1.8–7.7)
NEUTROPHILS NFR BLD: 81.4 % (ref 38–73)
NRBC BLD-RTO: 0 /100 WBC
PLATELET # BLD AUTO: 216 K/UL (ref 150–450)
PMV BLD AUTO: 9.8 FL (ref 9.2–12.9)
POTASSIUM SERPL-SCNC: 3.6 MMOL/L (ref 3.5–5.1)
PROT SERPL-MCNC: 6.4 G/DL (ref 6–8.4)
RBC # BLD AUTO: 3.41 M/UL (ref 4–5.4)
SODIUM SERPL-SCNC: 137 MMOL/L (ref 136–145)
WBC # BLD AUTO: 15.36 K/UL (ref 3.9–12.7)

## 2022-05-22 PROCEDURE — 80053 COMPREHEN METABOLIC PANEL: CPT | Performed by: NURSE PRACTITIONER

## 2022-05-22 PROCEDURE — 97165 OT EVAL LOW COMPLEX 30 MIN: CPT

## 2022-05-22 PROCEDURE — 27000221 HC OXYGEN, UP TO 24 HOURS

## 2022-05-22 PROCEDURE — 25000003 PHARM REV CODE 250: Performed by: NURSE PRACTITIONER

## 2022-05-22 PROCEDURE — 99223 1ST HOSP IP/OBS HIGH 75: CPT | Mod: ,,, | Performed by: INTERNAL MEDICINE

## 2022-05-22 PROCEDURE — 99900035 HC TECH TIME PER 15 MIN (STAT)

## 2022-05-22 PROCEDURE — 99232 SBSQ HOSP IP/OBS MODERATE 35: CPT | Mod: ,,, | Performed by: STUDENT IN AN ORGANIZED HEALTH CARE EDUCATION/TRAINING PROGRAM

## 2022-05-22 PROCEDURE — 99223 PR INITIAL HOSPITAL CARE,LEVL III: ICD-10-PCS | Mod: ,,, | Performed by: INTERNAL MEDICINE

## 2022-05-22 PROCEDURE — 85025 COMPLETE CBC W/AUTO DIFF WBC: CPT | Performed by: NURSE PRACTITIONER

## 2022-05-22 PROCEDURE — 25000003 PHARM REV CODE 250: Performed by: STUDENT IN AN ORGANIZED HEALTH CARE EDUCATION/TRAINING PROGRAM

## 2022-05-22 PROCEDURE — 25000003 PHARM REV CODE 250: Performed by: FAMILY MEDICINE

## 2022-05-22 PROCEDURE — 99232 PR SUBSEQUENT HOSPITAL CARE,LEVL II: ICD-10-PCS | Mod: ,,, | Performed by: STUDENT IN AN ORGANIZED HEALTH CARE EDUCATION/TRAINING PROGRAM

## 2022-05-22 PROCEDURE — 21400001 HC TELEMETRY ROOM

## 2022-05-22 PROCEDURE — 36415 COLL VENOUS BLD VENIPUNCTURE: CPT | Performed by: NURSE PRACTITIONER

## 2022-05-22 PROCEDURE — 63600175 PHARM REV CODE 636 W HCPCS: Performed by: FAMILY MEDICINE

## 2022-05-22 PROCEDURE — 94761 N-INVAS EAR/PLS OXIMETRY MLT: CPT

## 2022-05-22 PROCEDURE — 97530 THERAPEUTIC ACTIVITIES: CPT

## 2022-05-22 PROCEDURE — 97162 PT EVAL MOD COMPLEX 30 MIN: CPT

## 2022-05-22 RX ORDER — POTASSIUM CHLORIDE 20 MEQ/1
20 TABLET, EXTENDED RELEASE ORAL 2 TIMES DAILY
Status: COMPLETED | OUTPATIENT
Start: 2022-05-22 | End: 2022-05-22

## 2022-05-22 RX ORDER — LOSARTAN POTASSIUM 50 MG/1
50 TABLET ORAL DAILY
Status: DISCONTINUED | OUTPATIENT
Start: 2022-05-22 | End: 2022-05-23 | Stop reason: HOSPADM

## 2022-05-22 RX ADMIN — DEXTROSE 500 MG: 50 INJECTION, SOLUTION INTRAVENOUS at 12:05

## 2022-05-22 RX ADMIN — POLYETHYLENE GLYCOL 3350 17 G: 17 POWDER, FOR SOLUTION ORAL at 08:05

## 2022-05-22 RX ADMIN — LABETALOL HYDROCHLORIDE 10 MG: 5 INJECTION INTRAVENOUS at 12:05

## 2022-05-22 RX ADMIN — DOCUSATE SODIUM 100 MG: 100 CAPSULE, LIQUID FILLED ORAL at 08:05

## 2022-05-22 RX ADMIN — POTASSIUM CHLORIDE 20 MEQ: 1500 TABLET, EXTENDED RELEASE ORAL at 08:05

## 2022-05-22 RX ADMIN — METOPROLOL TARTRATE 25 MG: 25 TABLET, FILM COATED ORAL at 08:05

## 2022-05-22 RX ADMIN — BISACODYL 10 MG: 10 SUPPOSITORY RECTAL at 08:05

## 2022-05-22 RX ADMIN — DEXTROSE 500 MG: 50 INJECTION, SOLUTION INTRAVENOUS at 07:05

## 2022-05-22 RX ADMIN — OXCARBAZEPINE 150 MG: 150 TABLET, FILM COATED ORAL at 08:05

## 2022-05-22 RX ADMIN — Medication 400 MG: at 08:05

## 2022-05-22 RX ADMIN — CHOLECALCIFEROL TAB 25 MCG (1000 UNIT) 1000 UNITS: 25 TAB at 08:05

## 2022-05-22 RX ADMIN — PRAVASTATIN SODIUM 20 MG: 20 TABLET ORAL at 08:05

## 2022-05-22 RX ADMIN — LOSARTAN POTASSIUM 50 MG: 50 TABLET, FILM COATED ORAL at 12:05

## 2022-05-22 RX ADMIN — LEVOTHYROXINE SODIUM 125 MCG: 125 TABLET ORAL at 07:05

## 2022-05-22 RX ADMIN — LABETALOL HYDROCHLORIDE 10 MG: 5 INJECTION INTRAVENOUS at 05:05

## 2022-05-22 RX ADMIN — PHENOBARBITAL 97.2 MG: 32.4 TABLET ORAL at 08:05

## 2022-05-22 RX ADMIN — LABETALOL HYDROCHLORIDE 10 MG: 5 INJECTION INTRAVENOUS at 08:05

## 2022-05-22 NOTE — PROGRESS NOTES
[facility] Medicine  Progress Note    Patient Name: Annette Garcia  MRN: [id number]  Patient Class: IP- Inpatient   Admission Date: 5/20/2022  Length of Stay: 1 days  Attending Physician: Alice Barker*  Primary Care Provider: Jose Valles MD        Subjective:     Principal Problem:Partial small bowel obstruction        HPI:  Annette Garcia is a 86-year-old female with a history of CAD, disorder of kidney and ureter, HTN, lone atrial fibrillation, seizures, hypothyroidism who presented to the ED from [facility] for evaluation of recurrent episodes of vomiting as well as nausea today. She has multiple medical comorbidities including ostomy. She notes that she has had some element of abdominal pain associated with the vomiting today.  he has not take anything that she knows of to help with the symptoms, she believe she has vomited at least 5 times reportedly 8. She can not recall any episodes like this in the past, nothing in particular seems to make it any better.       In the ED she vomited x8 given Zofran with relief. Per on call general surgery who will see in am. Hold NGT for vomiting. CT Abdomen/Pelvis: Single nonspecific dilated loop of small bowel without transition point.  Low-grade partial obstruction not excluded. Thickening of the wall of the distal esophagus with intraluminal contrast material concerning for reflux esophagitis. Further evaluation with EGD is recommended to exclude a neoplastic process. Tree-in-bud nodules in the right middle lobe and the lingula with mild bronchiectasis in the lingula. Findings are likely related to an atypical infectious process such as MAC. Admitted to [facility] for further evaluation.Admitted to [facility] for further care and bowel obstruction work up.      Overview/Hospital Course:  No notes on file    Interval History: awake and alert, feels a lot better, start clear liquid diet and  advance as tolerated   HR improved  Hold Eliquis per surgery  Had a BM yesterday  Appreciates surgery rec's    UA with positive leukocytosis- Ceftriaxone    Review of Systems   Constitutional:  Negative for chills, diaphoresis and fever.   Respiratory:  Negative for shortness of breath.    Cardiovascular:  Negative for palpitations.   Gastrointestinal:  Negative for abdominal pain, nausea and vomiting.   Genitourinary:  Negative for dysuria.   Musculoskeletal:  Positive for arthralgias and back pain.   Psychiatric/Behavioral:  Negative for agitation.    Objective:     Vital Signs (Most Recent):  Temp: 98.2 °F (36.8 °C) (05/22/22 0439)  Pulse: 76 (05/22/22 0439)  Resp: 18 (05/22/22 0439)  BP: (!) 150/67 (05/22/22 0439)  SpO2: 95 % (05/22/22 0439)   Vital Signs (24h Range):  Temp:  [97.8 °F (36.6 °C)-98.6 °F (37 °C)] 98.2 °F (36.8 °C)  Pulse:  [] 76  Resp:  [18-20] 18  SpO2:  [88 %-98 %] 95 %  BP: (120-200)/(55-93) 150/67     Weight: 72.3 kg (159 lb 6.3 oz)  Body mass index is 33.31 kg/m².    Intake/Output Summary (Last 24 hours) at 5/22/2022 0656  Last data filed at 5/21/2022 1800  Gross per 24 hour   Intake --   Output 950 ml   Net -950 ml      Physical Exam  Vitals and nursing note reviewed.   Constitutional:       General: She is not in acute distress.     Appearance: Normal appearance. She is not ill-appearing or toxic-appearing.   HENT:      Head: Normocephalic and atraumatic.   Cardiovascular:      Rate and Rhythm: Normal rate and regular rhythm.      Pulses: Normal pulses.      Heart sounds: Normal heart sounds.   Pulmonary:      Effort: Pulmonary effort is normal. No respiratory distress.      Breath sounds: Normal breath sounds. No wheezing.   Abdominal:      General: Bowel sounds are normal. There is no distension.      Tenderness: There is no abdominal tenderness.   Musculoskeletal:         General: Swelling present. Normal range of motion.   Skin:     General: Skin is warm and dry.      Capillary  Refill: Capillary refill takes 2 to 3 seconds.      Findings: No erythema.      Comments: See photo below  Seems to be excoriation from ostomy pouch   Neurological:      Mental Status: She is alert and oriented to person, place, and time. Mental status is at baseline.   Psychiatric:         Mood and Affect: Mood normal.         Behavior: Behavior normal.       Significant Labs: ABGs: No results for input(s): PH, PCO2, HCO3, POCSATURATED, BE, TOTALHB, COHB, METHB, O2HB, POCFIO2, PO2 in the last 48 hours.  Blood Culture: No results for input(s): LABBLOO in the last 48 hours.  CBC:   Recent Labs   Lab 05/20/22 2046 05/21/22  0514 05/22/22  0513   WBC 16.14* 15.26* 15.36*   HGB 13.7 14.8 11.0*   HCT 41.1 44.0 33.6*    206 216     CMP:   Recent Labs   Lab 05/21/22 0514 05/21/22  1410 05/22/22  0513    137 137   K 3.6 3.5 3.6   CL 92* 94* 93*   CO2 28 27 31*   * 136* 121*   BUN 15 13 18   CREATININE 0.7 0.6 0.7   CALCIUM 9.2 9.2 9.0   PROT 7.2 7.0 6.4   ALBUMIN 3.9 3.7 3.5   BILITOT 0.7 0.8 0.7   ALKPHOS 109 103 93   AST 21 25 25   ALT 19 24 30   ANIONGAP 17* 16 13   EGFRNONAA >60 >60 >60     Lactic Acid:   Recent Labs   Lab 05/20/22 2046   LACTATE 2.0     Lipase:   Recent Labs   Lab 05/20/22 2129   LIPASE 3*     Lipid Panel: No results for input(s): CHOL, HDL, LDLCALC, TRIG, CHOLHDL in the last 48 hours.  Magnesium:   Recent Labs   Lab 05/20/22 2129 05/21/22  1410   MG 1.9 1.7     TSH: No results for input(s): TSH in the last 4320 hours.  Urine Culture: No results for input(s): LABURIN in the last 48 hours.  Urine Studies:   Recent Labs   Lab 05/21/22  1747   COLORU Yellow   APPEARANCEUA Clear   PHUR 7.0   SPECGRAV 1.020   PROTEINUA 2+*   GLUCUA Negative   KETONESU 1+*   BILIRUBINUA Negative   OCCULTUA Trace*   NITRITE Negative   UROBILINOGEN Negative   LEUKOCYTESUR Trace*   RBCUA 9*   WBCUA 1   BACTERIA Rare   HYALINECASTS 0       Significant Imaging: I have reviewed all pertinent imaging  results/findings within the past 24 hours.      Assessment/Plan:      * Partial small bowel obstruction  Slow transit constipation  Colostomy in place  From NH with N/V given zofran in ED with resolution for now  Ostomy site noted without stool or flatulence  CTAbd/pelvis: Single nonspecific dilated loop of small bowel without transition point. Low-grade partial obstruction not excluded. Thickening of the wall of the distal esophagus with intraluminal contrast material concerning for reflux esophagitis.  Further evaluation with EGD is recommended to exclude a neoplastic process. Tree-in-bud nodules in the right middle lobe and the lingula with mild bronchiectasis in the lingula.  Findings are likely related to an atypical infectious process such as MAC.  -NPO  -symptomatic management  -IVF  -replace electrolytes from vomiting  -appreciate GI recs-hold NGT for continuous vomiting    UTI (urinary tract infection)  UA positive   Cefazolin      Leukocytosis  WBC 16  Lactate normal  No indications of infection noted  UA pending      Chronic anemia  Stable  monitor      Paroxysmal atrial fibrillation  Chronic anticoagulation  Takes Eliquis  Resume and monitor    Advance care planning  Advance Care Planning     Date: 05/21/2022    Code Status  In light of the patients advanced and life limiting illness,I engaged the the family in a conversation about the patient's preferences for care  at the very end of life. The patient wishes to have a natural, peaceful death.  Along those lines, the patient does not wish to have CPR or other invasive treatments performed when her heart and/or breathing stops. I communicated to the family that a DNR order would be placed in her medical record to reflect this preference.  I spent a total of 20 minutes engaging the patient in this advance care planning discussion.             Urinary retention  Patient has not voided since admission  bladder scan with 74 now with 200  Will give patient a  chance to urinate on her one with out the help of gonzales or I & out cath  Monitor strict I/Os      Slow transit constipation  KUB with distal colon with stool.   Stool softener    Peripheral vascular disease, unspecified  Resume Statin       Anemia due to antineoplastic chemotherapy  Stable  monitor      Acquired hypothyroidism  Resume home meds      Epilepsy  seizure precautions  Trileptal level pending  phenobarbital level 7.9    Renovascular hypertension  History of stent insertion of left renal artery 7/19/17  Bilateral renal artery stenosis  Hypertensive on admission  Takes statin at home and denies BP med use-resume and monitor      VTE Risk Mitigation (From admission, onward)         Ordered     IP VTE HIGH RISK PATIENT  Once         05/21/22 0008     Place sequential compression device  Until discontinued         05/21/22 0008                Discharge Planning   JESSICA:      Code Status: DNR   Is the patient medically ready for discharge?:     Reason for patient still in hospital (select all that apply): Patient trending condition                     Alice Barker MD  Department of Hospital Medicine   [facility]

## 2022-05-22 NOTE — CONSULTS
[facility] - Med S[facility]  Cardiology  Consult Note    Patient Name: Annette Garcia  MRN: [id number]  Admission Date: 5/20/2022  Hospital Length of Stay: 1 days  Code Status: DNR   Attending Provider: [facility]  Consulting Provider: Charles Sargent MD  Primary Care Physician: Jose Valles MD  Principal Problem:Partial small bowel obstruction    Patient information was obtained from patient.     Consults  Subjective:     Chief Complaint:  afib with rvr    HPI:     86-year-old female with past medical history of left iliofemoral DVT, hypertension, nursing home resident, with a previous history of atrial fibrillation and chronic anticoagulation with Eliquis, bilateral renal artery stenosis, peripheral vascular disease, sinus pauses, diffuse large B-cell lymphoma, and chronic low back pain without sciatica admitted with GI issues.  She currently has a colostomy bag that is emptying well. A MET was called on 5/21/2022 for afib with rvr. HR back in NSR on lopressor         Echo 8/2020     Normal EF   PASP 28 mmHg   Overall unremarkable       DNR      Abnormal troponin due to demand and not acute coronary syndrome.      ECG 5/21/2022: afib with RVR;  bpm    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    In the setting of septic shock and near death    Petit mal epilepsy 1954    Scoliosis of lumbar spine     Seizures     Unspecified hypothyroidism     UTI (urinary tract infection) 5/22/2022       Past Surgical History:   Procedure Laterality Date    APPENDECTOMY      BACK SURGERY  1988    vertebral fracture    BACK SURGERY  02/2013    lumbar L2-5    CATARACT EXTRACTION, BILATERAL Bilateral     CHOLECYSTECTOMY      open    EYE SURGERY Bilateral     cataract removal with lens implant    HYSTERECTOMY      PERCUTANEOUS  TRANSLUMINAL ANGIOPLASTY (PTA) OF PERIPHERAL VESSEL N/A 2/3/2020    Procedure: PTA, PERIPHERAL VESSEL;  Surgeon: Timmy Simmons MD;  Location: [facility] CATH LAB/EP;  Service: Cardiology;  Laterality: N/A;    PORTACATH PLACEMENT Right 09/2016    RENAL ARTERY STENT Left 07/19/2017    SIGMOIDECTOMY  10/29/2019    SMALL INTESTINE SURGERY  08/23/2016    THROMBECTOMY N/A 2/3/2020    Procedure: THROMBECTOMY;  Surgeon: Timmy Simmons MD;  Location: [facility] CATH LAB/EP;  Service: Cardiology;  Laterality: N/A;    TONSILLECTOMY      VAGINAL HYSTERECTOMY W/ ANTERIOR AND POSTERIOR VAGINAL REPAIR         Review of patient's allergies indicates:   Allergen Reactions    Adhesive Itching and Blisters    Penicillins Anaphylaxis    Tramadol Hives    Avelox [moxifloxacin] Rash     Facial and arm itching and redness. Pt states throat closes when given.    Amoxil [amoxicillin]     Aspridrox [aspirin, buffered]     Codeine Other (See Comments)     Throat swelling    Keflex [cephalexin]      Tolerated cefepime and cefazolin    Norvasc [amlodipine]     Red dye Hives    Robitussin [guaifenesin]     Sulfa (sulfonamide antibiotics)     Tylenol [acetaminophen]      Has reaction to Tylenol with red dye and unable to take Extra Strength Tylenol/ CAN ONLY TOLERATE REG STRENGTH TYLENOL    Vicks vaporub [camphor-eucalyptus oil-menthol]        No current facility-administered medications on file prior to encounter.     Current Outpatient Medications on File Prior to Encounter   Medication Sig    acetaminophen (TYLENOL) 325 MG tablet Take 2 tablets (650 mg total) by mouth every 4 (four) hours as needed for Pain.    apixaban (ELIQUIS) 2.5 mg Tab Take 1 tablet (2.5 mg total) by mouth 2 (two) times daily.    calcium-vitamin D 600 mg(1,500mg) -400 unit Tab Take 1 tablet by mouth once daily.    levothyroxine (SYNTHROID) 125 MCG tablet TAKE 1 TABLET (125 MCG TOTAL) BY MOUTH ONCE DAILY.    magnesium oxide (MAG-OX) 400 mg (241.3 mg  magnesium) tablet Take 2 tablets (800 mg total) by mouth once daily.    ondansetron (ZOFRAN) 8 MG tablet Take 8 mg by mouth every 6 (six) hours as needed for Nausea (and vomiting).    OXcarbazepine (TRILEPTAL) 300 MG Tab Take 150 mg by mouth nightly.    PHENobarbitaL (LUMINAL) 97.2 MG tablet Take 1 tablet (97.2 mg total) by mouth every evening.    phenoL (CHLORASEPTIC) 0.5 % SprA by Mucous Membrane route 2 (two) times daily as needed (sore throat).    polyethylene glycol (GLYCOLAX) 17 gram PwPk Take 17 g by mouth 2 (two) times daily as needed (Constipation). (Patient taking differently: Take 17 g by mouth 2 (two) times daily as needed (Constipation). Mix in 6 ounces of water or liquid)    pravastatin (PRAVACHOL) 20 MG tablet Take 20 mg by mouth once daily.    vitamin D (VITAMIN D3) 1000 units Tab Take 1,000 Units by mouth once daily.    heparin flush, porcine, (HEPARIN FLUSH 10 UNITS/ML) 10 unit/mL injection 50 Units.     Family History     Problem Relation (Age of Onset)    Heart attack Father    Hypertension Father    Pancreatic cancer Mother        Tobacco Use    Smoking status: Never Smoker    Smokeless tobacco: Never Used   Substance and Sexual Activity    Alcohol use: No    Drug use: No    Sexual activity: Not on file     Review of Systems   Constitutional: Negative for diaphoresis, night sweats, weight gain and weight loss.   HENT: Negative for congestion.    Eyes: Negative for blurred vision, discharge and double vision.   Cardiovascular: Negative for chest pain, claudication, cyanosis, dyspnea on exertion, irregular heartbeat, leg swelling, near-syncope, orthopnea, palpitations, paroxysmal nocturnal dyspnea and syncope.   Respiratory: Negative for cough, shortness of breath and wheezing.    Endocrine: Negative for cold intolerance, heat intolerance and polyphagia.   Hematologic/Lymphatic: Negative for adenopathy and bleeding problem. Does not bruise/bleed easily.   Skin: Negative for dry skin  and nail changes.   Musculoskeletal: Negative for arthritis, back pain, falls, joint pain, myalgias and neck pain.   Gastrointestinal: Positive for abdominal pain. Negative for bloating, change in bowel habit and constipation.   Genitourinary: Negative for bladder incontinence, dysuria, flank pain, genital sores and missed menses.   Neurological: Negative for aphonia, brief paralysis, difficulty with concentration, dizziness and weakness.   Psychiatric/Behavioral: Negative for altered mental status and memory loss. The patient does not have insomnia.    Allergic/Immunologic: Negative for environmental allergies.     Objective:     Vital Signs (Most Recent):  Temp: 98.6 °F (37 °C) (05/22/22 1123)  Pulse: 73 (05/22/22 1123)  Resp: (!) 22 (05/22/22 1123)  BP: (!) 170/71 (05/22/22 1123)  SpO2: 95 % (05/22/22 1123) Vital Signs (24h Range):  Temp:  [97.9 °F (36.6 °C)-98.6 °F (37 °C)] 98.6 °F (37 °C)  Pulse:  [] 73  Resp:  [18-24] 22  SpO2:  [88 %-97 %] 95 %  BP: (120-200)/(55-93) 170/71     Weight: 72.3 kg (159 lb 6.3 oz)  Body mass index is 33.31 kg/m².    SpO2: 95 %  O2 Device (Oxygen Therapy): nasal cannula      Intake/Output Summary (Last 24 hours) at 5/22/2022 1250  Last data filed at 5/21/2022 2008  Gross per 24 hour   Intake --   Output 1550 ml   Net -1550 ml       Lines/Drains/Airways     Drain  Duration                Colostomy 10/29/19 1200  days          Peripheral Intravenous Line  Duration                Peripheral IV - Single Lumen 05/20/22 2045 22 G Distal;Right;Lateral Forearm 1 day                Physical Exam  Constitutional:       Appearance: She is well-developed.      Interventions: She is not intubated.  HENT:      Head: Normocephalic and atraumatic.      Right Ear: External ear normal.      Left Ear: External ear normal.   Eyes:      General: No scleral icterus.        Right eye: No discharge.         Left eye: No discharge.      Conjunctiva/sclera: Conjunctivae normal.      Pupils:  Pupils are equal, round, and reactive to light.   Neck:      Thyroid: No thyromegaly.      Vascular: Normal carotid pulses. No carotid bruit, hepatojugular reflux or JVD.      Trachea: No tracheal deviation.   Cardiovascular:      Rate and Rhythm: Normal rate and regular rhythm.  No extrasystoles are present.     Chest Wall: PMI is not displaced.      Pulses:           Carotid pulses are 2+ on the right side and 2+ on the left side.       Radial pulses are 2+ on the right side and 2+ on the left side.        Femoral pulses are 2+ on the right side and 2+ on the left side.       Popliteal pulses are 2+ on the right side and 2+ on the left side.        Dorsalis pedis pulses are 2+ on the right side and 2+ on the left side.        Posterior tibial pulses are 2+ on the right side and 2+ on the left side.      Heart sounds: S1 normal and S2 normal. Heart sounds not distant. No midsystolic click. No murmur heard.    No friction rub. No gallop. No S3 sounds.   Pulmonary:      Effort: Pulmonary effort is normal. No tachypnea, bradypnea, accessory muscle usage or respiratory distress. She is not intubated.      Breath sounds: Normal breath sounds. No stridor. No decreased breath sounds, wheezing or rales.   Chest:      Chest wall: No tenderness.   Abdominal:      General: There is no distension or abdominal bruit.      Palpations: There is no mass or pulsatile mass.      Tenderness: There is no abdominal tenderness. There is no guarding or rebound.      Comments:     Ostomy bag      Musculoskeletal:         General: No tenderness. Normal range of motion.      Cervical back: Normal range of motion and neck supple.   Lymphadenopathy:      Cervical: No cervical adenopathy.   Skin:     General: Skin is warm.      Coloration: Skin is not pale.      Findings: No erythema or rash.   Neurological:      Mental Status: She is alert and oriented to person, place, and time.      Cranial Nerves: No cranial nerve deficit.       Coordination: Coordination normal.      Deep Tendon Reflexes: Reflexes are normal and symmetric.   Psychiatric:         Behavior: Behavior normal.         Thought Content: Thought content normal.         Judgment: Judgment normal.         Significant Labs:     LABS  CBC  Recent Labs   Lab 05/20/22 2046 05/21/22  0514 05/22/22  0513   WBC 16.14* 15.26* 15.36*   RBC 4.22 4.55 3.41*   HGB 13.7 14.8 11.0*   HCT 41.1 44.0 33.6*    206 216   MCV 97 97 99*   MCH 32.5* 32.5* 32.3*   MCHC 33.3 33.6 32.7     BMP  Recent Labs   Lab 05/21/22 0514 05/21/22  1410 05/22/22  0513    137 137   K 3.6 3.5 3.6   CO2 28 27 31*   CL 92* 94* 93*   BUN 15 13 18   CREATININE 0.7 0.6 0.7   * 136* 121*       POCT-Glucose  No results found for: POCTGLUCOSE    Recent Labs   Lab 05/20/22 2129 05/21/22  0514 05/21/22  1410 05/22/22  0513   CALCIUM 10.2 9.2 9.2 9.0   MG 1.9  --  1.7  --      LFT  Recent Labs   Lab 05/21/22 0514 05/21/22  1410 05/22/22  0513   PROT 7.2 7.0 6.4   ALBUMIN 3.9 3.7 3.5   BILITOT 0.7 0.8 0.7   AST 21 25 25   ALKPHOS 109 103 93   ALT 19 24 30     GFR     COAGS  No results for input(s): PT, INR, APTT in the last 168 hours.  CE  Recent Labs   Lab 05/21/22  1410   TROPONINI 0.029*     ABGs  No results for input(s): PH, PCO2, PO2, HCO3, POCSATURATED, BE in the last 24 hours.  BNP  No results for input(s): BNP in the last 168 hours.    LAST HbA1c  No results found for: HGBA1C    Lipid panel  Lab Results   Component Value Date    CHOL 220 (H) 05/09/2017     Lab Results   Component Value Date    HDL 64 05/09/2017     Lab Results   Component Value Date    LDLCALC 124.8 05/09/2017     Lab Results   Component Value Date    TRIG 156 (H) 05/09/2017     Lab Results   Component Value Date    CHOLHDL 29.1 05/09/2017          Significant Imaging:       Imaging Results          CT Abdomen Pelvis  Without Contrast (Final result)  Result time 05/20/22 20:02:53    Final result by Richard Romero DO (05/20/22  20:02:53)                 Impression:      1. Single nonspecific dilated loop of small bowel without transition point.  Low-grade partial obstruction not excluded.  2. Thickening of the wall of the distal esophagus with intraluminal contrast material concerning for reflux esophagitis.  Further evaluation with EGD is recommended to exclude a neoplastic process.  3. Tree-in-bud nodules in the right middle lobe and the lingula with mild bronchiectasis in the lingula.  Findings are likely related to an atypical infectious process such as MAC.      Electronically signed by: Richard Romero  Date:    05/20/2022  Time:    20:02             Narrative:    EXAMINATION:  CT ABDOMEN PELVIS WITHOUT CONTRAST    CLINICAL HISTORY:  Bowel obstruction suspected;    TECHNIQUE:  Multiplanar images were obtained of the abdomen and pelvis from the hemidiaphragms through the symphysis pubis without intravenous contrast.    COMPARISON:  CT of the abdomen and pelvis from 09/13/2020.    FINDINGS:  Lung Bases: There are tree-in-bud nodules in the right middle lobe and the lingula.  There is bronchiectasis in the lingula, partially imaged.  There are small bilateral pleural effusions.  Bandlike opacity in the medial left lower lobe, likely atelectasis or scarring.    Heart: Heart size is normal.  No pericardial effusion.    Distal esophagus: There is thickening of the distal esophageal wall.  Oral contrast is seen within the distal esophageal lumen.  There is a probable small hiatal hernia.    Liver: Normal in size and attenuation without focal hepatic lesion.    Biliary tract: No intrahepatic or extrahepatic biliary ductal dilatation.    Gallbladder: Surgically absent.    Pancreas: There is mild fatty atrophy of the pancreas.  No focal lesion.  No pancreatic ductal dilation.    Spleen: Normal size without focal lesion.    Adrenals: There is mild heterogeneous thickening of the bilateral adrenal glands, nonspecific and similar to  prior    Kidneys and urinary collecting systems: Normal.  No hydronephrosis or urolithiasis.    Lymph nodes: None enlarged.    Stomach and bowel: The stomach is normal.  There is a single moderately distended loop of small bowel measuring up to 3.7 cm in diameter, without well-defined transition point.  There is a right lower quadrant small bowel anastomosis with continued bowel distension, similar to prior.  There is a left lower quadrant colostomy with a fat containing parastomal hernia.  There is a J-pouch.    Peritoneum and mesentery: No ascites or free intraperitoneal air. No abdominal fluid collection.    Vasculature: Severe calcified atherosclerosis.  No aneurysm.  There is a stent within the left common and external iliac veins.    Urinary bladder: Normal.    Reproductive organs: The uterus is unremarkable.    Body wall: Midline abdominal wall incision compatible with prior surgery.    Musculoskeletal: No aggressive osseous lesion.  There are multilevel degenerative changes of the lumbar spine with postop changes of L4 laminectomy.  There are remote compression fractures of the T11, L2, and L4 vertebral bodies with unchanged height loss from prior.                                Assessment and Plan:     Active Diagnoses:    Diagnosis Date Noted POA    PRINCIPAL PROBLEM:  Partial small bowel obstruction [K56.600] 05/21/2022 Yes    UTI (urinary tract infection) [N39.0] 05/22/2022 Unknown    Leukocytosis [D72.829] 05/21/2022 Yes    Colostomy in place [Z93.3] 09/14/2020 Not Applicable    Chronic anticoagulation [Z79.01] 08/20/2020 Not Applicable    Chronic anemia [D64.9] 08/14/2020 Yes    Paroxysmal atrial fibrillation [I48.0] 12/28/2019 Yes     Chronic    Advance care planning [Z71.89] 11/22/2019 Not Applicable    Urinary retention [R33.9] 11/04/2019 Yes     Chronic    Peripheral vascular disease, unspecified [I73.9] 10/28/2019 Yes     Chronic    Slow transit constipation [K59.01] 10/28/2019 Yes      Chronic    Bilateral renal artery stenosis [I70.1] 01/16/2017 Yes    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, T45.1X5A] 12/27/2016 Yes     Chronic    Acquired hypothyroidism [E03.9] 08/18/2016 Yes     Chronic    Epilepsy [G40.909] 08/18/2016 Yes     Chronic    Renovascular hypertension [I15.0] 08/18/2016 Yes     Chronic      Problems Resolved During this Admission:    Diagnosis Date Noted Date Resolved POA    History of stent insertion of left renal artery 7/19/17 [Z98.890] 07/19/2017 05/21/2022 Not Applicable     Chronic       VTE Risk Mitigation (From admission, onward)         Ordered     IP VTE HIGH RISK PATIENT  Once         05/21/22 0008     Place sequential compression device  Until discontinued         05/21/22 0008                  Thank you for allowing us to participate in care this patient.  Patient has a known history of atrial fibrillation.  Likely the rapid ventricular rate was due to stress from her GI issues.  She is currently in normal sinus rhythm after receiving Lopressor.  Her colostomy bags and taking appropriately.  Continue with Lopressor as he have initiated.  Resume anticoagulation prior to discharge if there are no contraindications.  Follow-up with cardiology as outpatient.  Thank you          Thank you for your consult.         Charles Sargent MD  Cardiology   [facility]

## 2022-05-22 NOTE — PLAN OF CARE
Problem: Physical Therapy  Goal: Physical Therapy Goal  Description: Goals to be met by: 2022    Patient will increase functional independence with mobility by performin. Supine to sit with Stand-by Assistance  2. Sit to supine with Stand-by Assistance  3. Rolling to Left and Right with Stand-by Assistance.  4. Sit to stand transfer with Contact Guard Assistance  5. Bed to chair transfer with Contact Guard Assistance using Rolling Walker  6. Gait  x 25 feet with Contact Guard Assistance using Rolling Walker.     Outcome: Ongoing, Progressing       Patient seen for physical therapy evaluation, co-eval with OT.  Evaluation somewhat limited due to patient declining transfer today due to feeling weak from nausea and vomiting yesterday.  Patient is a resident at [facility] and would like to return there.  She performed supine to sit with max assist x 1, sit to supine with max assist x 2 and sat at EOB x 10 minutes with SBA.  Recommend patient continue to have PT/OT in nursing home setting after discharge.

## 2022-05-22 NOTE — SUBJECTIVE & OBJECTIVE
Interval History: awake and alert, feels a lot better, start clear liquid diet and advance as tolerated   HR improved  Hold Eliquis per surgery  Had a BM yesterday  Appreciates surgery rec's    UA with positive leukocytosis- Ceftriaxone    Review of Systems   Constitutional:  Negative for chills, diaphoresis and fever.   Respiratory:  Negative for shortness of breath.    Cardiovascular:  Negative for palpitations.   Gastrointestinal:  Negative for abdominal pain, nausea and vomiting.   Genitourinary:  Negative for dysuria.   Musculoskeletal:  Positive for arthralgias and back pain.   Psychiatric/Behavioral:  Negative for agitation.    Objective:     Vital Signs (Most Recent):  Temp: 98.2 °F (36.8 °C) (05/22/22 0439)  Pulse: 76 (05/22/22 0439)  Resp: 18 (05/22/22 0439)  BP: (!) 150/67 (05/22/22 0439)  SpO2: 95 % (05/22/22 0439)   Vital Signs (24h Range):  Temp:  [97.8 °F (36.6 °C)-98.6 °F (37 °C)] 98.2 °F (36.8 °C)  Pulse:  [] 76  Resp:  [18-20] 18  SpO2:  [88 %-98 %] 95 %  BP: (120-200)/(55-93) 150/67     Weight: 72.3 kg (159 lb 6.3 oz)  Body mass index is 33.31 kg/m².    Intake/Output Summary (Last 24 hours) at 5/22/2022 0656  Last data filed at 5/21/2022 1800  Gross per 24 hour   Intake --   Output 950 ml   Net -950 ml      Physical Exam  Vitals and nursing note reviewed.   Constitutional:       General: She is not in acute distress.     Appearance: Normal appearance. She is not ill-appearing or toxic-appearing.   HENT:      Head: Normocephalic and atraumatic.   Cardiovascular:      Rate and Rhythm: Normal rate and regular rhythm.      Pulses: Normal pulses.      Heart sounds: Normal heart sounds.   Pulmonary:      Effort: Pulmonary effort is normal. No respiratory distress.      Breath sounds: Normal breath sounds. No wheezing.   Abdominal:      General: Bowel sounds are normal. There is no distension.      Tenderness: There is no abdominal tenderness.   Musculoskeletal:         General: Swelling present.  Normal range of motion.   Skin:     General: Skin is warm and dry.      Capillary Refill: Capillary refill takes 2 to 3 seconds.      Findings: No erythema.      Comments: See photo below  Seems to be excoriation from ostomy pouch   Neurological:      Mental Status: She is alert and oriented to person, place, and time. Mental status is at baseline.   Psychiatric:         Mood and Affect: Mood normal.         Behavior: Behavior normal.       Significant Labs: ABGs: No results for input(s): PH, PCO2, HCO3, POCSATURATED, BE, TOTALHB, COHB, METHB, O2HB, POCFIO2, PO2 in the last 48 hours.  Blood Culture: No results for input(s): LABBLOO in the last 48 hours.  CBC:   Recent Labs   Lab 05/20/22 2046 05/21/22 0514 05/22/22  0513   WBC 16.14* 15.26* 15.36*   HGB 13.7 14.8 11.0*   HCT 41.1 44.0 33.6*    206 216     CMP:   Recent Labs   Lab 05/21/22 0514 05/21/22  1410 05/22/22  0513    137 137   K 3.6 3.5 3.6   CL 92* 94* 93*   CO2 28 27 31*   * 136* 121*   BUN 15 13 18   CREATININE 0.7 0.6 0.7   CALCIUM 9.2 9.2 9.0   PROT 7.2 7.0 6.4   ALBUMIN 3.9 3.7 3.5   BILITOT 0.7 0.8 0.7   ALKPHOS 109 103 93   AST 21 25 25   ALT 19 24 30   ANIONGAP 17* 16 13   EGFRNONAA >60 >60 >60     Lactic Acid:   Recent Labs   Lab 05/20/22 2046   LACTATE 2.0     Lipase:   Recent Labs   Lab 05/20/22 2129   LIPASE 3*     Lipid Panel: No results for input(s): CHOL, HDL, LDLCALC, TRIG, CHOLHDL in the last 48 hours.  Magnesium:   Recent Labs   Lab 05/20/22 2129 05/21/22  1410   MG 1.9 1.7     TSH: No results for input(s): TSH in the last 4320 hours.  Urine Culture: No results for input(s): LABURIN in the last 48 hours.  Urine Studies:   Recent Labs   Lab 05/21/22  1747   COLORU Yellow   APPEARANCEUA Clear   PHUR 7.0   SPECGRAV 1.020   PROTEINUA 2+*   GLUCUA Negative   KETONESU 1+*   BILIRUBINUA Negative   OCCULTUA Trace*   NITRITE Negative   UROBILINOGEN Negative   LEUKOCYTESUR Trace*   RBCUA 9*   WBCUA 1   BACTERIA Rare    HYALINECASTS 0       Significant Imaging: I have reviewed all pertinent imaging results/findings within the past 24 hours.

## 2022-05-22 NOTE — PLAN OF CARE
Pt would benefit from cont OT services in order to maximize functional independence. Recommending return to prior level of care at [facility]. OT chris performed this date with PT, pt agreeable to therapy. Pt reports feeling weaker after multiple episodes of N/V yesterday. Pt agreeable to sit EOB. Pt requires MaxA for sup>sit, MaxA x2 for sit>sup & to scoot to HOB with use of draw sheet. Pt left HOB with lunch tray set up.     Problem: Occupational Therapy  Goal: Occupational Therapy Goal  Description: Goals to be met by: 6/22/2022     Patient will increase functional independence with ADLs by performing:    Feeding with Set-up Assistance.  Grooming while seated with Set-up Assistance.  Toileting from bedside commode with Stand-by Assistance for hygiene and clothing management.   Step transfer with Minimal Assistance & appropriate AD.  Toilet transfer to bedside commode with Minimal Assistance & appropriate AD.  Increased functional strength to WFL for self-care.    Outcome: Ongoing, Progressing

## 2022-05-22 NOTE — PT/OT/SLP EVAL
Occupational Therapy   Evaluation    Name: Annette Garcia  MRN: [id number]  Admitting Diagnosis:  Partial small bowel obstruction  Recent Surgery: * No surgery found *      Recommendations:     Discharge Recommendations: other (see comments) (return to [facility])  Discharge Equipment Recommendations:  other (see comments) (defer to next level of care)  Barriers to discharge:  None    Assessment:     Annette Garcia is a 86 y.o. female with a medical diagnosis of Partial small bowel obstruction.  She presents with The primary encounter diagnosis was Nausea and vomiting, intractability of vomiting not specified, unspecified vomiting type. Diagnoses of Partial small bowel obstruction, Chest pain, Right lower quadrant abdominal pain, and Tachycardia with hypertension were also pertinent to this visit. Performance deficits affecting function: weakness, gait instability, decreased upper extremity function, decreased lower extremity function, impaired balance, impaired endurance, impaired cardiopulmonary response to activity, decreased ROM, impaired functional mobilty, impaired self care skills, pain, decreased safety awareness, impaired joint extensibility, decreased coordination.      Pt would benefit from cont OT services in order to maximize functional independence. Recommending return to prior level of care at [facility]. OT chris performed this date with PT, pt agreeable to therapy. Pt reports feeling weaker after multiple episodes of N/V yesterday. Pt agreeable to sit EOB. Pt requires MaxA for sup>sit, MaxA x2 for sit>sup & to scoot to HOB with use of draw sheet. Pt left HOB with lunch tray set up.     Rehab Prognosis: Fair; patient would benefit from acute skilled OT services to address these deficits and reach maximum level of function.       Plan:     Patient to be seen 2 x/week to address the above listed problems via self-care/home management, therapeutic activities, therapeutic exercises  · Plan of Care  Expires: 06/22/22  · Plan of Care Reviewed with: patient    Subjective     Chief Complaint: Abdominal pain  Patient/Family Comments/goals: Return to NH    Occupational Profile:  Living Environment: Pt is a resident at [facility]  Previous level of function: Pt gets OOB daily to WC w/assistance, able to feed self, has assistance with dressing/bathing  Roles and Routines: Likes to sit in WC during the day  Equipment Used at Home:  other (see comments) (DME at NH)  Assistance upon Discharge: Staff at NH    Pain/Comfort:  · Pain Rating 1: 2/10  · Location - Orientation 1: generalized  · Location 1: abdomen  · Pain Addressed 1: Cessation of Activity, Distraction, Reposition  · Pain Rating Post-Intervention 1: other (see comments) (not rated)    Patients cultural, spiritual, Spiritism conflicts given the current situation: no    Objective:     Communicated with: ana prior to session.  Patient found HOB elevated with oxygen, bed alarm, SCD, colostomy, peripheral IV upon OT entry to room.    General Precautions: Standard, fall   Orthopedic Precautions:N/A   Braces: N/A  Respiratory Status: Nasal cannula    Occupational Performance:    Bed Mobility:    · Patient completed Scooting/Bridging with maximal assistance and 2 persons  · Patient completed Supine to Sit with maximal assistance  · Patient completed Sit to Supine with maximal assistance and 2 persons    Functional Mobility/Transfers:  · Pt declining OOB ax this date 2/2 weakness & fatigue after N&V    Cognitive/Visual Perceptual:  Cognitive/Psychosocial Skills:     -       Oriented to: Person, Place and Situation   -       Follows Commands/attention:Follows multistep  commands  -       Mood/Affect/Coping skills/emotional control: Cooperative and Pleasant    Physical Exam:  Sensation:    -       Intact  light/touch BUE  Upper Extremity Range of Motion:     -       Right Upper Extremity: Deficits: decreased shoulder flex ~90 degrees & decreased ER  -       Left Upper  Extremity: WFL except slight decreased shoulder flex  Upper Extremity Strength:    -       Right Upper Extremity: 3+/5  -       Left Upper Extremity: 3+/5   Strength:    -       Right Upper Extremity: Fair  -       Left Upper Extremity: Fair    AMPAC 6 Click ADL:  AMPAC Total Score: 14    Treatment & Education:  OT chris performed this date with PT, pt agreeable to therapy.   Pt reports feeling weaker after multiple episodes of N/V yesterday.   Pt agreeable to sit EOB.   Pt requires MaxA for sup>sit, MaxA x2 for sit>sup & to scoot to HOB with use of draw sheet.   Pt left HOB with lunch tray set up.   Education:    Patient left HOB elevated with all lines intact, call button in reach, bed alarm on and nsg notified    GOALS:   Multidisciplinary Problems     Occupational Therapy Goals        Problem: Occupational Therapy    Goal Priority Disciplines Outcome Interventions   Occupational Therapy Goal     OT, PT/OT Ongoing, Progressing    Description: Goals to be met by: 6/22/2022     Patient will increase functional independence with ADLs by performing:    Feeding with Set-up Assistance.  Grooming while seated with Set-up Assistance.  Toileting from bedside commode with Stand-by Assistance for hygiene and clothing management.   Step transfer with Minimal Assistance & appropriate AD.  Toilet transfer to bedside commode with Minimal Assistance & appropriate AD.  Increased functional strength to WFL for self-care.                     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    In the setting of septic shock and near death    Petit mal epilepsy 1954    Scoliosis of lumbar spine     Seizures     Unspecified hypothyroidism     UTI (urinary tract infection) 5/22/2022       Past Surgical History:   Procedure  Laterality Date    APPENDECTOMY      BACK SURGERY  1988    vertebral fracture    BACK SURGERY  02/2013    lumbar L2-5    CATARACT EXTRACTION, BILATERAL Bilateral     CHOLECYSTECTOMY      open    EYE SURGERY Bilateral     cataract removal with lens implant    HYSTERECTOMY      PERCUTANEOUS TRANSLUMINAL ANGIOPLASTY (PTA) OF PERIPHERAL VESSEL N/A 2/3/2020    Procedure: PTA, PERIPHERAL VESSEL;  Surgeon: Timmy Simmons MD;  Location: [facility] CATH LAB/EP;  Service: Cardiology;  Laterality: N/A;    PORTACATH PLACEMENT Right 09/2016    RENAL ARTERY STENT Left 07/19/2017    SIGMOIDECTOMY  10/29/2019    SMALL INTESTINE SURGERY  08/23/2016    THROMBECTOMY N/A 2/3/2020    Procedure: THROMBECTOMY;  Surgeon: Timmy Simmons MD;  Location: [facility] CATH LAB/EP;  Service: Cardiology;  Laterality: N/A;    TONSILLECTOMY      VAGINAL HYSTERECTOMY W/ ANTERIOR AND POSTERIOR VAGINAL REPAIR         Time Tracking:     OT Date of Treatment: 05/22/22  OT Start Time: 1148  OT Stop Time: 1207  OT Total Time (min): 19 min    Billable Minutes:Evaluation 10 with PT    5/22/2022

## 2022-05-22 NOTE — NURSING
Patient /73, PRN Labetalol not due yet. Dr Bauer informed and put in order to start losartan. Colostomy intact. Patient on Iv antibiotics. No c/o pain or discomfort. Call light within reach. Bed alarm on.

## 2022-05-22 NOTE — OP NOTE
[facility]  General Surgery  Progress Note    Subjective:     Interval History:   Feels well, denies pain, NV  Wants to try broth  Colostomy bag emptied twice  Asymptomatic afib with rvr yesterday, normal rate since      Post-Op Info:  * No surgery found *          Medications:  Continuous Infusions:  Scheduled Meds:   bisacodyL  10 mg Rectal Daily    docusate sodium  100 mg Oral BID    levothyroxine  125 mcg Oral Before breakfast    magnesium oxide  400 mg Oral BID    metoprolol tartrate  25 mg Oral BID    OXcarbazepine  150 mg Oral Nightly    PHENobarbitaL  97.2 mg Oral QHS    polyethylene glycol  17 g Oral BID    potassium chloride  20 mEq Oral BID    pravastatin  20 mg Oral Daily    vitamin D  1,000 Units Oral Daily     PRN Meds:acetaminophen, albuterol-ipratropium, glucagon (human recombinant), glucose, glucose, labetalol, melatonin, naloxone, polyethylene glycol     Objective:     Vital Signs (Most Recent):  Temp: 97.9 °F (36.6 °C) (05/22/22 0737)  Pulse: 73 (05/22/22 0737)  Resp: (!) 24 (05/22/22 0737)  BP: (!) 179/72 (05/22/22 0737)  SpO2: (!) 94 % (05/22/22 0737) Vital Signs (24h Range):  Temp:  [97.8 °F (36.6 °C)-98.6 °F (37 °C)] 97.9 °F (36.6 °C)  Pulse:  [] 73  Resp:  [18-24] 24  SpO2:  [88 %-98 %] 94 %  BP: (120-200)/(55-93) 179/72       Intake/Output Summary (Last 24 hours) at 5/22/2022 0813  Last data filed at 5/21/2022 2008  Gross per 24 hour   Intake --   Output 1550 ml   Net -1550 ml       Physical Exam  Ab soft    Significant Labs:  CBC:   Recent Labs   Lab 05/22/22 0513   WBC 15.36*   RBC 3.41*   HGB 11.0*   HCT 33.6*      MCV 99*   MCH 32.3*   MCHC 32.7     CMP:   Recent Labs   Lab 05/22/22 0513   *   CALCIUM 9.0   ALBUMIN 3.5   PROT 6.4      K 3.6   CO2 31*   CL 93*   BUN 18   CREATININE 0.7   ALKPHOS 93   ALT 30   AST 25   BILITOT 0.7       Significant Diagnostics:  I have reviewed all pertinent imaging results/findings within the past 24  hours.    Assessment/Plan:     Active Diagnoses:    Diagnosis Date Noted POA    PRINCIPAL PROBLEM:  Partial small bowel obstruction [K56.600] 05/21/2022 Yes    Leukocytosis [D72.829] 05/21/2022 Yes    Colostomy in place [Z93.3] 09/14/2020 Not Applicable    Chronic anticoagulation [Z79.01] 08/20/2020 Not Applicable    Chronic anemia [D64.9] 08/14/2020 Yes    Paroxysmal atrial fibrillation [I48.0] 12/28/2019 Yes     Chronic    Advance care planning [Z71.89] 11/22/2019 Not Applicable    Urinary retention [R33.9] 11/04/2019 Yes     Chronic    Peripheral vascular disease, unspecified [I73.9] 10/28/2019 Yes     Chronic    Slow transit constipation [K59.01] 10/28/2019 Yes     Chronic    Bilateral renal artery stenosis [I70.1] 01/16/2017 Yes    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 Advance to clears. Ok to advance as tolerated after that        Chris Johnson MD  General Surgery  [facility]

## 2022-05-22 NOTE — PT/OT/SLP EVAL
"Physical Therapy Evaluation    Patient Name:  Annette Garcia   MRN:  [id number]    Recommendations:     Discharge Recommendations:  other (see comments) (return to [facility] with therapy services)   Discharge Equipment Recommendations: other (see comments) (defer to NH)   Barriers to discharge: None    Assessment:     Annette Garcia is a 86 y.o. female admitted with a medical diagnosis of Partial small bowel obstruction.  She presents with the following impairments/functional limitations:  weakness, impaired functional mobilty, impaired endurance, gait instability, pain, impaired balance, impaired self care skills, decreased lower extremity function.  Patient seen for physical therapy evaluation, co-eval with OT.  Evaluation somewhat limited due to patient declining transfer today due to feeling weak from nausea and vomiting yesterday.  Patient is a resident at [facility] and would like to return there.  She performed supine to sit with max assist x 1, sit to supine with max assist x 2 and sat at EOB x 10 minutes with SBA.  Recommend patient continue to have PT/OT in nursing home setting after discharge.      Rehab Prognosis: Fair; patient would benefit from acute skilled PT services to address these deficits and reach maximum level of function.    Recent Surgery: * No surgery found *      Plan:     During this hospitalization, patient to be seen 2 x/week to address the identified rehab impairments via gait training, therapeutic activities, therapeutic exercises, neuromuscular re-education and progress toward the following goals:    · Plan of Care Expires:  06/21/22    Subjective     Chief Complaint: "I am so tired and weak from all the vomiting I did yesterday'  Patient/Family Comments/goals: To return to [facility] at [facility]  Pain/Comfort:  · Pain Rating 1: 2/10  · Location - Side 1: Bilateral  · Location - Orientation 1: generalized  · Location 1: abdomen  · Pain Addressed 1: Reposition, Distraction, Cessation of " Activity, Nurse notified  · Pain Rating Post-Intervention 1:  (does not rate)    Patients cultural, spiritual, Moravian conflicts given the current situation: no    Living Environment:  Patient is a resident of [facility].  Prior to admission, patients level of function was patient is assisted by staff into wheelchair daily, feeds self, requires assist for all ADLs.  Patient states she was walking at times with RW with therapy.    Equipment used at home: other (see comments) (DME at NH).  DME owned (not currently used): none.  Upon discharge, patient will have assistance from NH staff.    Objective:     Communicated with nurse Briscoe prior to session.  Patient found supine with oxygen, bed alarm, SCD, colostomy, peripheral IV  upon PT entry to room.    General Precautions: Standard, fall   Orthopedic Precautions:N/A   Braces: N/A  Respiratory Status: Nasal cannula, flow 1 L/min    Exams:  · Cognitive Exam:  Patient is oriented to Person, Place, Time and Situation  · Gross Motor Coordination:  WFL  · Postural Exam:  Patient presented with the following abnormalities:    · -       Rounded shoulders  · -       Forward head  · -       Kyphosis  · Skin Integrity/Edema:      · -       visible skin intact,  Colostomy in place  · RLE ROM: WFL  · RLE Strength: 3+ to 4/5 grossly  · LLE ROM: WFL  · LLE Strength: 3+ to 4/5 grossly    Functional Mobility:  · Bed Mobility:     · Rolling Left:  moderate assistance  · Scooting: maximal assistance and of 2 persons  · Supine to Sit: maximal assistance and of 1 persons  · Sit to Supine: maximal assistance and of 2 persons  · Transfers:  Declines transfer today due to feeling weak and vomiting yesterday  · Balance: Patient sat at EOB with SBA x 10 minutes, no LOB    Therapeutic Activities and Exercises:   Educated on role of physical therapy.  Educated to perform supine exercises while in bed.  Educated on importance of OOB.    AM-PAC 6 CLICK MOBILITY  Total Score:8     Patient left  supine with all lines intact, call button in reach, bed alarm on and nurse notified.    GOALS:   Multidisciplinary Problems     Physical Therapy Goals        Problem: Physical Therapy    Goal Priority Disciplines Outcome Goal Variances Interventions   Physical Therapy Goal     PT, PT/OT Ongoing, Progressing     Description: Goals to be met by: 2022    Patient will increase functional independence with mobility by performin. Supine to sit with Stand-by Assistance  2. Sit to supine with Stand-by Assistance  3. Rolling to Left and Right with Stand-by Assistance.  4. Sit to stand transfer with Contact Guard Assistance  5. Bed to chair transfer with Contact Guard Assistance using Rolling Walker  6. Gait  x 25 feet with Contact Guard Assistance using Rolling Walker.                      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    In the setting of septic shock and near death    Petit mal epilepsy     Scoliosis of lumbar spine     Seizures     Unspecified hypothyroidism     UTI (urinary tract infection) 2022       Past Surgical History:   Procedure Laterality Date    APPENDECTOMY      BACK SURGERY      vertebral fracture    BACK SURGERY  2013    lumbar L2-5    CATARACT EXTRACTION, BILATERAL Bilateral     CHOLECYSTECTOMY      open    EYE SURGERY Bilateral     cataract removal with lens implant    HYSTERECTOMY      PERCUTANEOUS TRANSLUMINAL ANGIOPLASTY (PTA) OF PERIPHERAL VESSEL N/A 2/3/2020    Procedure: PTA, PERIPHERAL VESSEL;  Surgeon: Timmy Simmons MD;  Location: [facility] CATH LAB/EP;  Service: Cardiology;  Laterality: N/A;    PORTACATH PLACEMENT Right 2016    RENAL ARTERY STENT Left 2017    SIGMOIDECTOMY  10/29/2019    SMALL INTESTINE SURGERY  2016     THROMBECTOMY N/A 2/3/2020    Procedure: THROMBECTOMY;  Surgeon: Timmy Simmons MD;  Location: [facility] CATH LAB/EP;  Service: Cardiology;  Laterality: N/A;    TONSILLECTOMY      VAGINAL HYSTERECTOMY W/ ANTERIOR AND POSTERIOR VAGINAL REPAIR         Time Tracking:     PT Received On: 05/22/22  PT Start Time: 1148     PT Stop Time: 1207  PT Total Time (min): 19 min     Billable Minutes: Evaluation 10 (co-eval with PT), TA x 9      05/22/2022

## 2022-05-23 VITALS
DIASTOLIC BLOOD PRESSURE: 72 MMHG | WEIGHT: 159.38 LBS | BODY MASS INDEX: 33.45 KG/M2 | TEMPERATURE: 98 F | RESPIRATION RATE: 18 BRPM | HEIGHT: 58 IN | OXYGEN SATURATION: 98 % | HEART RATE: 72 BPM | SYSTOLIC BLOOD PRESSURE: 167 MMHG

## 2022-05-23 LAB
ALBUMIN SERPL BCP-MCNC: 3.1 G/DL (ref 3.5–5.2)
ALP SERPL-CCNC: 86 U/L (ref 55–135)
ALT SERPL W/O P-5'-P-CCNC: 24 U/L (ref 10–44)
ANION GAP SERPL CALC-SCNC: 12 MMOL/L (ref 8–16)
AST SERPL-CCNC: 21 U/L (ref 10–40)
BASOPHILS # BLD AUTO: 0.03 K/UL (ref 0–0.2)
BASOPHILS NFR BLD: 0.3 % (ref 0–1.9)
BILIRUB SERPL-MCNC: 0.3 MG/DL (ref 0.1–1)
BUN SERPL-MCNC: 17 MG/DL (ref 8–23)
CALCIUM SERPL-MCNC: 8.4 MG/DL (ref 8.7–10.5)
CHLORIDE SERPL-SCNC: 95 MMOL/L (ref 95–110)
CO2 SERPL-SCNC: 26 MMOL/L (ref 23–29)
CREAT SERPL-MCNC: 0.6 MG/DL (ref 0.5–1.4)
DIFFERENTIAL METHOD: ABNORMAL
EOSINOPHIL # BLD AUTO: 0.2 K/UL (ref 0–0.5)
EOSINOPHIL NFR BLD: 1.8 % (ref 0–8)
ERYTHROCYTE [DISTWIDTH] IN BLOOD BY AUTOMATED COUNT: 13.4 % (ref 11.5–14.5)
EST. GFR  (AFRICAN AMERICAN): >60 ML/MIN/1.73 M^2
EST. GFR  (NON AFRICAN AMERICAN): >60 ML/MIN/1.73 M^2
GLUCOSE SERPL-MCNC: 94 MG/DL (ref 70–110)
HCT VFR BLD AUTO: 31.6 % (ref 37–48.5)
HGB BLD-MCNC: 10.4 G/DL (ref 12–16)
IMM GRANULOCYTES # BLD AUTO: 0.03 K/UL (ref 0–0.04)
IMM GRANULOCYTES NFR BLD AUTO: 0.3 % (ref 0–0.5)
LYMPHOCYTES # BLD AUTO: 2 K/UL (ref 1–4.8)
LYMPHOCYTES NFR BLD: 17.9 % (ref 18–48)
MCH RBC QN AUTO: 32.2 PG (ref 27–31)
MCHC RBC AUTO-ENTMCNC: 32.9 G/DL (ref 32–36)
MCV RBC AUTO: 98 FL (ref 82–98)
MONOCYTES # BLD AUTO: 0.9 K/UL (ref 0.3–1)
MONOCYTES NFR BLD: 8.2 % (ref 4–15)
NEUTROPHILS # BLD AUTO: 7.8 K/UL (ref 1.8–7.7)
NEUTROPHILS NFR BLD: 71.5 % (ref 38–73)
NRBC BLD-RTO: 0 /100 WBC
PLATELET # BLD AUTO: 196 K/UL (ref 150–450)
PMV BLD AUTO: 10 FL (ref 9.2–12.9)
POTASSIUM SERPL-SCNC: 3.6 MMOL/L (ref 3.5–5.1)
PROT SERPL-MCNC: 5.9 G/DL (ref 6–8.4)
RBC # BLD AUTO: 3.23 M/UL (ref 4–5.4)
SODIUM SERPL-SCNC: 133 MMOL/L (ref 136–145)
WBC # BLD AUTO: 10.89 K/UL (ref 3.9–12.7)

## 2022-05-23 PROCEDURE — 99900035 HC TECH TIME PER 15 MIN (STAT)

## 2022-05-23 PROCEDURE — 25000003 PHARM REV CODE 250: Performed by: FAMILY MEDICINE

## 2022-05-23 PROCEDURE — 25000003 PHARM REV CODE 250: Performed by: STUDENT IN AN ORGANIZED HEALTH CARE EDUCATION/TRAINING PROGRAM

## 2022-05-23 PROCEDURE — 99232 PR SUBSEQUENT HOSPITAL CARE,LEVL II: ICD-10-PCS | Mod: ,,, | Performed by: STUDENT IN AN ORGANIZED HEALTH CARE EDUCATION/TRAINING PROGRAM

## 2022-05-23 PROCEDURE — 97110 THERAPEUTIC EXERCISES: CPT

## 2022-05-23 PROCEDURE — 36415 COLL VENOUS BLD VENIPUNCTURE: CPT | Performed by: NURSE PRACTITIONER

## 2022-05-23 PROCEDURE — 94761 N-INVAS EAR/PLS OXIMETRY MLT: CPT

## 2022-05-23 PROCEDURE — 63600175 PHARM REV CODE 636 W HCPCS: Performed by: FAMILY MEDICINE

## 2022-05-23 PROCEDURE — 99232 SBSQ HOSP IP/OBS MODERATE 35: CPT | Mod: ,,, | Performed by: STUDENT IN AN ORGANIZED HEALTH CARE EDUCATION/TRAINING PROGRAM

## 2022-05-23 PROCEDURE — 85025 COMPLETE CBC W/AUTO DIFF WBC: CPT | Performed by: NURSE PRACTITIONER

## 2022-05-23 PROCEDURE — 80053 COMPREHEN METABOLIC PANEL: CPT | Performed by: NURSE PRACTITIONER

## 2022-05-23 PROCEDURE — 25000003 PHARM REV CODE 250: Performed by: NURSE PRACTITIONER

## 2022-05-23 RX ORDER — METOPROLOL TARTRATE 25 MG/1
25 TABLET, FILM COATED ORAL 2 TIMES DAILY
Qty: 60 TABLET | Refills: 11
Start: 2022-05-23 | End: 2023-12-15 | Stop reason: SDUPTHER

## 2022-05-23 RX ORDER — NITROFURANTOIN 25; 75 MG/1; MG/1
100 CAPSULE ORAL 2 TIMES DAILY
Qty: 20 CAPSULE | Refills: 0
Start: 2022-05-23 | End: 2022-06-02

## 2022-05-23 RX ORDER — LOSARTAN POTASSIUM 50 MG/1
50 TABLET ORAL DAILY
Qty: 90 TABLET | Refills: 3 | Status: ON HOLD
Start: 2022-05-23 | End: 2022-10-17 | Stop reason: SDUPTHER

## 2022-05-23 RX ORDER — POLYETHYLENE GLYCOL 3350 17 G/17G
17 POWDER, FOR SOLUTION ORAL DAILY
Qty: 30 PACKET | Refills: 5
Start: 2022-05-23

## 2022-05-23 RX ADMIN — DEXTROSE 500 MG: 50 INJECTION, SOLUTION INTRAVENOUS at 12:05

## 2022-05-23 RX ADMIN — POLYETHYLENE GLYCOL 3350 17 G: 17 POWDER, FOR SOLUTION ORAL at 09:05

## 2022-05-23 RX ADMIN — DOCUSATE SODIUM 100 MG: 100 CAPSULE, LIQUID FILLED ORAL at 09:05

## 2022-05-23 RX ADMIN — METOPROLOL TARTRATE 25 MG: 25 TABLET, FILM COATED ORAL at 09:05

## 2022-05-23 RX ADMIN — LABETALOL HYDROCHLORIDE 10 MG: 5 INJECTION INTRAVENOUS at 02:05

## 2022-05-23 RX ADMIN — CHOLECALCIFEROL TAB 25 MCG (1000 UNIT) 1000 UNITS: 25 TAB at 09:05

## 2022-05-23 RX ADMIN — PRAVASTATIN SODIUM 20 MG: 20 TABLET ORAL at 09:05

## 2022-05-23 RX ADMIN — DEXTROSE 500 MG: 50 INJECTION, SOLUTION INTRAVENOUS at 04:05

## 2022-05-23 RX ADMIN — Medication 400 MG: at 09:05

## 2022-05-23 RX ADMIN — LEVOTHYROXINE SODIUM 125 MCG: 125 TABLET ORAL at 06:05

## 2022-05-23 RX ADMIN — LOSARTAN POTASSIUM 50 MG: 50 TABLET, FILM COATED ORAL at 09:05

## 2022-05-23 NOTE — PLAN OF CARE
SW met with pt at bedside to complete assessment. Pt is AxO 3 and able to verbally answer assessment questions.  Pt is received in bed with O2 NC. Pt reports being from [facility] and pt d/c plan is to return to facility with transportation assistance. Facility physician will see pt as a hospital follow up. Pt confirmed facility able to give pt medications as prescribed and pt has insurance coverage to afford all medications. Pt report having enough support from her 2 children Lorenza and Beatris who live on the [place]. ISRAEL updated whiteboard with [facility] name and contact information. SW confirmed pt understanding of expected discharge plan. SW will continue to follow pt throughout care and assist with any discharge needs.         05/23/22 0990   Discharge Assessment   Assessment Type Discharge Planning Assessment   Confirmed/corrected address, phone number and insurance Yes   Confirmed Demographics Correct on Facesheet   Source of Information patient   Communicated JESSICA with patient/caregiver Yes   Reason For Admission Chest Pain Nausea and vomitting Partial bowel obstruction   Lives With facility resident   Facility Arrived From: [facility]   Do you expect to return to your current living situation? Yes   Do you have help at home or someone to help you manage your care at home? No   Prior to hospitilization cognitive status: Alert/Oriented   Current cognitive status: Alert/Oriented   Walking or Climbing Stairs Difficulty ambulation difficulty, requires equipment   Dressing/Bathing Difficulty bathing difficulty, assistance 1 person   Equipment Currently Used at Home hospital bed   Readmission within 30 days? No   Patient currently being followed by outpatient case management? No   Do you currently have service(s) that help you manage your care at home? No   Do you take prescription medications? Yes   Do you have prescription coverage? Yes   Coverage medicare   Do you have any problems affording any of  your prescribed medications? No   Is the patient taking medications as prescribed? yes   Who is going to help you get home at discharge? Wheelchair van transport   How do you get to doctors appointments? other (see comments)   Are you on dialysis? No   Do you take coumadin? No   Discharge Plan A Return to nursing home   Discharge Plan B New Nursing Home placement - senior living care facility   DME Needed Upon Discharge  none   Discharge Plan discussed with: Patient;Adult children   Discharge Barriers Identified Transportation     ZARIA [facility] Case Management

## 2022-05-23 NOTE — ASSESSMENT & PLAN NOTE
Patient has not voided since admission  bladder scan with 74 now with 200  Will give patient a chance to urinate on her own with out the help of gonzales or I & out cath  Monitor strict I/Os

## 2022-05-23 NOTE — PLAN OF CARE
D/c orders noted.     Set up completed for pt to return to [facility]. RN can call report to [phone] nurse Violet room 115.     Eduard met with pt to discuss d/c plans. Pt is agreeable to return to [facility]. Eduard completed patient choice form with pt.     Eduard set up ambulance transportation for pt for 12:00p.m.     Pt is cleared to go from  standpoint.        05/23/22 1152   Final Note   Assessment Type Final Discharge Note   Anticipated Discharge Disposition senior care Nu  ([facility])   What phone number can be called within the next 1-3 days to see how you are doing after discharge? [phone]   Hospital Resources/Appts/Education Provided Appointments scheduled by Navigator/Coordinator   Post-Acute Status   Post-Acute Authorization Placement   Post-Acute Placement Status Set-up Complete/Auth obtained   Coverage Medicare   Discharge Delays None known at this time

## 2022-05-23 NOTE — PLAN OF CARE
[facility]    FACILITY TRANSFER ORDERS      Patient Name: Annetet Garcia  YOB: 1935    PCP: Jose Valles MD   PCP Address: [address] / [place] [address]  PCP Phone Number: [phone]  PCP Fax: [phone]    Encounter Date: 05/23/2022    Admit to: Nursing home    Vital Signs:  Routine    Diagnoses:   Active Hospital Problems    Diagnosis  POA    *Partial small bowel obstruction [K56.600]  Yes    UTI (urinary tract infection) [N39.0]  Yes    Leukocytosis [D72.829]  Yes    Colostomy in place [Z93.3]  Not Applicable    Chronic anticoagulation [Z79.01]  Not Applicable    Chronic anemia [D64.9]  Yes    Paroxysmal atrial fibrillation [I48.0]  Yes     Chronic    Advance care planning [Z71.89]  Not Applicable    Urinary retention [R33.9]  Yes     Chronic     Martinez removed 11/4/19, had 500 cc on bladder scan after several hours      Peripheral vascular disease, unspecified [I73.9]  Yes     Chronic    Slow transit constipation [K59.01]  Yes     Chronic    Bilateral renal artery stenosis [I70.1]  Yes    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, T45.1X5A]  Yes     Chronic    Acquired hypothyroidism [E03.9]  Yes     Chronic    Epilepsy [G40.909]  Yes     Chronic     Epilepsy type unknown      Renovascular hypertension [I15.0]  Yes     Chronic      Resolved Hospital Problems    Diagnosis Date Resolved POA    History of stent insertion of left renal artery 7/19/17 [Z98.890] 05/21/2022 Not Applicable     Chronic       Allergies:  Review of patient's allergies indicates:   Allergen Reactions    Adhesive Itching and Blisters    Penicillins Anaphylaxis    Tramadol Hives    Avelox [moxifloxacin] Rash     Facial and arm itching and redness. Pt states throat closes when given.    Amoxil [amoxicillin]     Aspridrox [aspirin, buffered]     Codeine Other (See Comments)     Throat swelling    Keflex [cephalexin]      Tolerated cefepime and cefazolin    Norvasc  [amlodipine]     Red dye Hives    Robitussin [guaifenesin]     Sulfa (sulfonamide antibiotics)     Tylenol [acetaminophen]      Has reaction to Tylenol with red dye and unable to take Extra Strength Tylenol/ CAN ONLY TOLERATE REG STRENGTH TYLENOL    Vicks vaporub [camphor-eucalyptus oil-menthol]        Diet: cardiac diet    Activities: Activity as tolerated    Nursing: per facility protocol           MISCELLANEOUS CARE:  Colostomy Care: Empty bag every shift and PRN. Change and clean site every 48 hours.       Medications: Review discharge medications with patient and family and provide education.      Current Discharge Medication List      START taking these medications    Details   losartan (COZAAR) 50 MG tablet Take 1 tablet (50 mg total) by mouth once daily.  Qty: 90 tablet, Refills: 3    Comments: .      metoprolol tartrate (LOPRESSOR) 25 MG tablet Take 1 tablet (25 mg total) by mouth 2 (two) times daily.  Qty: 60 tablet, Refills: 11    Comments: .      nitrofurantoin, macrocrystal-monohydrate, (MACROBID) 100 MG capsule Take 1 capsule (100 mg total) by mouth 2 (two) times daily. for 10 days  Qty: 20 capsule, Refills: 0         CONTINUE these medications which have CHANGED    Details   polyethylene glycol (GLYCOLAX) 17 gram PwPk Take 17 g by mouth once daily.  Qty: 30 packet, Refills: 5         CONTINUE these medications which have NOT CHANGED    Details   acetaminophen (TYLENOL) 325 MG tablet Take 2 tablets (650 mg total) by mouth every 4 (four) hours as needed for Pain.  Refills: 0      apixaban (ELIQUIS) 2.5 mg Tab Take 1 tablet (2.5 mg total) by mouth 2 (two) times daily.  Qty:        calcium-vitamin D 600 mg(1,500mg) -400 unit Tab Take 1 tablet by mouth once daily.      levothyroxine (SYNTHROID) 125 MCG tablet TAKE 1 TABLET (125 MCG TOTAL) BY MOUTH ONCE DAILY.  Qty: 90 tablet, Refills: 3      magnesium oxide (MAG-OX) 400 mg (241.3 mg magnesium) tablet Take 2 tablets (800 mg total) by mouth once  daily.  Refills: 0      ondansetron (ZOFRAN) 8 MG tablet Take 8 mg by mouth every 6 (six) hours as needed for Nausea (and vomiting).      OXcarbazepine (TRILEPTAL) 300 MG Tab Take 150 mg by mouth nightly.      PHENobarbitaL (LUMINAL) 97.2 MG tablet Take 1 tablet (97.2 mg total) by mouth every evening.  Qty: 30 tablet, Refills: 5      phenoL (CHLORASEPTIC) 0.5 % SprA by Mucous Membrane route 2 (two) times daily as needed (sore throat).      pravastatin (PRAVACHOL) 20 MG tablet Take 20 mg by mouth once daily.      vitamin D (VITAMIN D3) 1000 units Tab Take 1,000 Units by mouth once daily.         STOP taking these medications       heparin flush, porcine, (HEPARIN FLUSH 10 UNITS/ML) 10 unit/mL injection Comments:   Reason for Stopping:                  Immunizations Administered as of 5/23/2022     No immunizations on file.          unknown    Some patients may experience side effects after vaccination.  These may include fever, headache, muscle or joint aches.  Most symptoms resolve with 24-48 hours and do not require urgent medical evaluation unless they persist for more than 72 hours or symptoms are concerning for an unrelated medical condition.          _________________________________  Alice Barker MD  05/23/2022

## 2022-05-23 NOTE — CONSULTS
[facility]  Cardiology  Consult Note    Patient Name: Annette Garcia  MRN: [id number]  Admission Date: 5/20/2022  Hospital Length of Stay: 1 days  Code Status: DNR   Attending Provider: Alice Barker*   Consulting Provider: SERGEY Mena, ANP  Primary Care Physician: Jose Valles MD  Principal Problem:Partial small bowel obstruction        Inpatient consult to [facility]  Consult performed by: SERGEY Grier, ANP  Consult ordered by: Alice Barker MD        Consult:    See full consult noted dated 5/22/2022 by Dr Charles Sargent

## 2022-05-23 NOTE — ASSESSMENT & PLAN NOTE
Advance Care Planning     Date: 05/21/2022    Code Status  In light of the patients advanced and life limiting illness,I engaged the the family in a conversation about the patient's preferences for care  at the very end of life. The patient wishes to have a natural, peaceful death.  Along those lines, the patient does not wish to have CPR or other invasive treatments performed when her heart and/or breathing stops. I communicated to the family that a DNR order would be placed in her medical record to reflect this preference.  I spent a total of 20 minutes engaging the patient in this advance care planning discussion.

## 2022-05-23 NOTE — ASSESSMENT & PLAN NOTE
WBC 16  Lactate normal  No indications of infection noted  UA positive - Cefazolin and switch to Macrobid on disxcharge

## 2022-05-23 NOTE — SUBJECTIVE & OBJECTIVE
Interval History:   NAEON  Tolerating clears and fulls. No nausea, vomiting  Ostomy functioning     Medications:  Continuous Infusions:  Scheduled Meds:   bisacodyL  10 mg Rectal Daily    ceFAZolin (ANCEF) IVPB  500 mg Intravenous Q8H    docusate sodium  100 mg Oral BID    levothyroxine  125 mcg Oral Before breakfast    losartan  50 mg Oral Daily    magnesium oxide  400 mg Oral BID    metoprolol tartrate  25 mg Oral BID    OXcarbazepine  150 mg Oral Nightly    PHENobarbitaL  97.2 mg Oral QHS    polyethylene glycol  17 g Oral BID    pravastatin  20 mg Oral Daily    vitamin D  1,000 Units Oral Daily     PRN Meds:acetaminophen, albuterol-ipratropium, glucagon (human recombinant), glucose, glucose, labetalol, melatonin, naloxone, polyethylene glycol     Review of patient's allergies indicates:   Allergen Reactions    Adhesive Itching and Blisters    Penicillins Anaphylaxis    Tramadol Hives    Avelox [moxifloxacin] Rash     Facial and arm itching and redness. Pt states throat closes when given.    Amoxil [amoxicillin]     Aspridrox [aspirin, buffered]     Codeine Other (See Comments)     Throat swelling    Keflex [cephalexin]      Tolerated cefepime and cefazolin    Norvasc [amlodipine]     Red dye Hives    Robitussin [guaifenesin]     Sulfa (sulfonamide antibiotics)     Tylenol [acetaminophen]      Has reaction to Tylenol with red dye and unable to take Extra Strength Tylenol/ CAN ONLY TOLERATE REG STRENGTH TYLENOL    Vicks vaporub [camphor-eucalyptus oil-menthol]      Objective:     Vital Signs (Most Recent):  Temp: 98.4 °F (36.9 °C) (05/23/22 0755)  Pulse: 74 (05/23/22 0845)  Resp: 18 (05/23/22 0755)  BP: (!) 154/86 (05/23/22 0755)  SpO2: 95 % (05/23/22 0845)   Vital Signs (24h Range):  Temp:  [98.2 °F (36.8 °C)-98.6 °F (37 °C)] 98.4 °F (36.9 °C)  Pulse:  [] 74  Resp:  [18-22] 18  SpO2:  [95 %-98 %] 95 %  BP: (132-213)/(60-88) 154/86     Weight: 72.3 kg (159 lb 6.3 oz)  Body mass index is 33.31  kg/m².    Intake/Output - Last 3 Shifts         05/21 0700  05/22 0659 05/22 0700  05/23 0659 05/23 0700  05/24 0659    P.O.  520     IV Piggyback  98.8     Total Intake(mL/kg)  618.8 (8.6)     Urine (mL/kg/hr) 950 (0.5) 800 (0.5)     Emesis/NG output 600      Stool  400     Total Output 1550 1200     Net -1550 -581.2            Stool Occurrence  1 x             Physical Exam  Vitals and nursing note reviewed.   Constitutional:       Appearance: Normal appearance.   HENT:      Head: Normocephalic and atraumatic.   Cardiovascular:      Rate and Rhythm: Normal rate and regular rhythm.   Pulmonary:      Effort: Pulmonary effort is normal. No respiratory distress.   Abdominal:      General: Abdomen is flat.      Palpations: Abdomen is soft.      Comments: LLQ stoma with stool in appliance    Skin:     General: Skin is warm and dry.      Findings: Bruising present.   Neurological:      General: No focal deficit present.      Mental Status: She is alert.   Psychiatric:         Mood and Affect: Mood normal.         Behavior: Behavior normal.       Significant Labs:  CBC:   Recent Labs   Lab 05/23/22  0452   WBC 10.89   RBC 3.23*   HGB 10.4*   HCT 31.6*      MCV 98   MCH 32.2*   MCHC 32.9     CMP:   Recent Labs   Lab 05/23/22 0452   GLU 94   CALCIUM 8.4*   ALBUMIN 3.1*   PROT 5.9*   *   K 3.6   CO2 26   CL 95   BUN 17   CREATININE 0.6   ALKPHOS 86   ALT 24   AST 21   BILITOT 0.3       Significant Diagnostics:  I have reviewed all pertinent imaging results/findings within the past 24 hours.

## 2022-05-23 NOTE — PLAN OF CARE
Pt agreeable to therapy this date, progressing towards goals. Pt declining EOB/OOB ax at this time but agreeable to BUE exs seated up in bed. Pt requires MaxA x2 to scoot to HOB. Pt educated on/performing BUE exs with yellow theraband. Pt with no c/o of pain throughout session.     Problem: Occupational Therapy  Goal: Occupational Therapy Goal  Description: Goals to be met by: 6/22/2022     Patient will increase functional independence with ADLs by performing:    Feeding with Set-up Assistance.  Grooming while seated with Set-up Assistance.  Toileting from bedside commode with Stand-by Assistance for hygiene and clothing management.   Step transfer with Minimal Assistance & appropriate AD.  Toilet transfer to bedside commode with Minimal Assistance & appropriate AD.  Increased functional strength to WFL for self-care.    Outcome: Ongoing, Progressing

## 2022-05-23 NOTE — PROGRESS NOTES
[facility] Medicine  Progress Note    Patient Name: Annette Garcia  MRN: [id number]  Patient Class: IP- Inpatient   Admission Date: 5/20/2022  Length of Stay: 2 days  Attending Physician: Alice Barker*  Primary Care Provider: Jose Valles MD        Subjective:     Principal Problem:Partial small bowel obstruction        HPI:  Annette Garcia is a 86-year-old female with a history of CAD, disorder of kidney and ureter, HTN, lone atrial fibrillation, seizures, hypothyroidism who presented to the ED from [facility] for evaluation of recurrent episodes of vomiting as well as nausea today. She has multiple medical comorbidities including ostomy. She notes that she has had some element of abdominal pain associated with the vomiting today.  he has not take anything that she knows of to help with the symptoms, she believe she has vomited at least 5 times reportedly 8. She can not recall any episodes like this in the past, nothing in particular seems to make it any better.       In the ED she vomited x8 given Zofran with relief. Per on call general surgery who will see in am. Hold NGT for vomiting. CT Abdomen/Pelvis: Single nonspecific dilated loop of small bowel without transition point.  Low-grade partial obstruction not excluded. Thickening of the wall of the distal esophagus with intraluminal contrast material concerning for reflux esophagitis. Further evaluation with EGD is recommended to exclude a neoplastic process. Tree-in-bud nodules in the right middle lobe and the lingula with mild bronchiectasis in the lingula. Findings are likely related to an atypical infectious process such as MAC. Admitted to [facility] for further evaluation.Admitted to [facility] for further care and bowel obstruction work up.      Overview/Hospital Course:  No notes on file    Interval History: awake and alert, feels better, tolerating diet and advanced.    Possible discharge today    HR improved  Hold Eliquis per surgery- resume at discharge     Appreciates surgery rec's    UA with positive leukocytosis- Cefazolin- discharge on Macrobid    Review of Systems   Constitutional:  Negative for chills, diaphoresis and fever.   Respiratory:  Negative for shortness of breath.    Cardiovascular:  Negative for palpitations.   Gastrointestinal:  Negative for abdominal pain, nausea and vomiting.   Genitourinary:  Negative for dysuria.   Musculoskeletal:  Positive for arthralgias and back pain.   Psychiatric/Behavioral:  Negative for agitation.    Objective:     Vital Signs (Most Recent):  Temp: 98.4 °F (36.9 °C) (05/23/22 0755)  Pulse: 74 (05/23/22 0845)  Resp: 18 (05/23/22 0755)  BP: (!) 154/86 (05/23/22 0755)  SpO2: 95 % (05/23/22 0845)   Vital Signs (24h Range):  Temp:  [98.2 °F (36.8 °C)-98.6 °F (37 °C)] 98.4 °F (36.9 °C)  Pulse:  [] 74  Resp:  [18-22] 18  SpO2:  [95 %-98 %] 95 %  BP: (132-213)/(60-88) 154/86     Weight: 72.3 kg (159 lb 6.3 oz)  Body mass index is 33.31 kg/m².    Intake/Output Summary (Last 24 hours) at 5/23/2022 0855  Last data filed at 5/23/2022 0600  Gross per 24 hour   Intake 618.81 ml   Output 1200 ml   Net -581.19 ml        Physical Exam  Vitals and nursing note reviewed.   Constitutional:       General: She is not in acute distress.     Appearance: Normal appearance. She is not ill-appearing or toxic-appearing.   HENT:      Head: Normocephalic and atraumatic.   Cardiovascular:      Rate and Rhythm: Normal rate and regular rhythm.      Pulses: Normal pulses.      Heart sounds: Normal heart sounds.   Pulmonary:      Effort: Pulmonary effort is normal. No respiratory distress.      Breath sounds: Normal breath sounds. No wheezing.   Abdominal:      General: Bowel sounds are normal. There is no distension.      Palpations: Abdomen is soft.      Tenderness: There is no abdominal tenderness.   Musculoskeletal:         General: No swelling. Normal  range of motion.   Skin:     General: Skin is warm and dry.      Capillary Refill: Capillary refill takes 2 to 3 seconds.      Findings: No erythema.   Neurological:      Mental Status: She is alert and oriented to person, place, and time. Mental status is at baseline.   Psychiatric:         Mood and Affect: Mood normal.         Behavior: Behavior normal.       Significant Labs: ABGs: No results for input(s): PH, PCO2, HCO3, POCSATURATED, BE, TOTALHB, COHB, METHB, O2HB, POCFIO2, PO2 in the last 48 hours.  Blood Culture: No results for input(s): LABBLOO in the last 48 hours.  CBC:   Recent Labs   Lab 05/22/22  0513 05/23/22  0452   WBC 15.36* 10.89   HGB 11.0* 10.4*   HCT 33.6* 31.6*    196       CMP:   Recent Labs   Lab 05/21/22  1410 05/22/22  0513 05/23/22  0452    137 133*   K 3.5 3.6 3.6   CL 94* 93* 95   CO2 27 31* 26   * 121* 94   BUN 13 18 17   CREATININE 0.6 0.7 0.6   CALCIUM 9.2 9.0 8.4*   PROT 7.0 6.4 5.9*   ALBUMIN 3.7 3.5 3.1*   BILITOT 0.8 0.7 0.3   ALKPHOS 103 93 86   AST 25 25 21   ALT 24 30 24   ANIONGAP 16 13 12   EGFRNONAA >60 >60 >60       Lactic Acid:   No results for input(s): LACTATE in the last 48 hours.    Lipase:   No results for input(s): LIPASE in the last 48 hours.    Lipid Panel: No results for input(s): CHOL, HDL, LDLCALC, TRIG, CHOLHDL in the last 48 hours.  Magnesium:   Recent Labs   Lab 05/21/22  1410   MG 1.7       TSH: No results for input(s): TSH in the last 4320 hours.  Urine Culture: No results for input(s): LABURIN in the last 48 hours.  Urine Studies:   Recent Labs   Lab 05/21/22  1747   COLORU Yellow   APPEARANCEUA Clear   PHUR 7.0   SPECGRAV 1.020   PROTEINUA 2+*   GLUCUA Negative   KETONESU 1+*   BILIRUBINUA Negative   OCCULTUA Trace*   NITRITE Negative   UROBILINOGEN Negative   LEUKOCYTESUR Trace*   RBCUA 9*   WBCUA 1   BACTERIA Rare   HYALINECASTS 0         Significant Imaging: I have reviewed all pertinent imaging results/findings within the past 24  hours.      Assessment/Plan:      * Partial small bowel obstruction  Slow transit constipation  Colostomy in place  From NH with N/V given zofran in ED with resolution for now  Ostomy site noted without stool or flatulence  CTAbd/pelvis: Single nonspecific dilated loop of small bowel without transition point. Low-grade partial obstruction not excluded. Thickening of the wall of the distal esophagus with intraluminal contrast material concerning for reflux esophagitis.  Further evaluation with EGD is recommended to exclude a neoplastic process. Tree-in-bud nodules in the right middle lobe and the lingula with mild bronchiectasis in the lingula.  Findings are likely related to an atypical infectious process such as MAC.  -NPO  -symptomatic management  -IVF  -replace electrolytes from vomiting  -appreciate GI recs-hold NGT for continuous vomiting    UTI (urinary tract infection)  UA positive   Cefazolin switch to Macrobid on discharge      Leukocytosis  WBC 16  Lactate normal  No indications of infection noted  UA positive - Cefazolin and switch to Macrobid on disxcharge    Colostomy in place  stable    Chronic anticoagulation  Resume eliquis at discharge      Chronic anemia  Stable  monitor      Paroxysmal atrial fibrillation  Chronic anticoagulation  Takes Eliquis  Resume and monitor    Advance care planning  Advance Care Planning     Date: 05/21/2022    Code Status  In light of the patients advanced and life limiting illness,I engaged the the family in a conversation about the patient's preferences for care  at the very end of life. The patient wishes to have a natural, peaceful death.  Along those lines, the patient does not wish to have CPR or other invasive treatments performed when her heart and/or breathing stops. I communicated to the family that a DNR order would be placed in her medical record to reflect this preference.  I spent a total of 20 minutes engaging the patient in this advance care planning  discussion.             Urinary retention  Patient has not voided since admission  bladder scan with 74 now with 200  Will give patient a chance to urinate on her own with out the help of gonzales or I & out cath  Monitor strict I/Os      Slow transit constipation  KUB with distal colon with stool.   Stool softener    Peripheral vascular disease, unspecified  Resume Statin       Anemia due to antineoplastic chemotherapy  Stable  monitor      Acquired hypothyroidism  Resume home meds      Epilepsy  seizure precautions  Trileptal level pending  phenobarbital level 7.9    Renovascular hypertension  History of stent insertion of left renal artery 7/19/17  Bilateral renal artery stenosis  Hypertensive on admission  Takes statin at home and denies BP med use-resume and monitor      VTE Risk Mitigation (From admission, onward)         Ordered     IP VTE HIGH RISK PATIENT  Once         05/21/22 0008     Place sequential compression device  Until discontinued         05/21/22 0008                Discharge Planning   JESSICA: 5/23/2022     Code Status: DNR   Is the patient medically ready for discharge?:     Reason for patient still in hospital (select all that apply): Pending disposition                     Alice Barker MD  Department of Hospital Medicine   [facility]

## 2022-05-23 NOTE — SUBJECTIVE & OBJECTIVE
Interval History: awake and alert, feels better, tolerating diet and advanced.   Possible discharge today    HR improved  Hold Eliquis per surgery- resume at discharge     Appreciates surgery rec's    UA with positive leukocytosis- Cefazolin- discharge on Macrobid    Review of Systems   Constitutional:  Negative for chills, diaphoresis and fever.   Respiratory:  Negative for shortness of breath.    Cardiovascular:  Negative for palpitations.   Gastrointestinal:  Negative for abdominal pain, nausea and vomiting.   Genitourinary:  Negative for dysuria.   Musculoskeletal:  Positive for arthralgias and back pain.   Psychiatric/Behavioral:  Negative for agitation.    Objective:     Vital Signs (Most Recent):  Temp: 98.4 °F (36.9 °C) (05/23/22 0755)  Pulse: 74 (05/23/22 0845)  Resp: 18 (05/23/22 0755)  BP: (!) 154/86 (05/23/22 0755)  SpO2: 95 % (05/23/22 0845)   Vital Signs (24h Range):  Temp:  [98.2 °F (36.8 °C)-98.6 °F (37 °C)] 98.4 °F (36.9 °C)  Pulse:  [] 74  Resp:  [18-22] 18  SpO2:  [95 %-98 %] 95 %  BP: (132-213)/(60-88) 154/86     Weight: 72.3 kg (159 lb 6.3 oz)  Body mass index is 33.31 kg/m².    Intake/Output Summary (Last 24 hours) at 5/23/2022 0855  Last data filed at 5/23/2022 0600  Gross per 24 hour   Intake 618.81 ml   Output 1200 ml   Net -581.19 ml        Physical Exam  Vitals and nursing note reviewed.   Constitutional:       General: She is not in acute distress.     Appearance: Normal appearance. She is not ill-appearing or toxic-appearing.   HENT:      Head: Normocephalic and atraumatic.   Cardiovascular:      Rate and Rhythm: Normal rate and regular rhythm.      Pulses: Normal pulses.      Heart sounds: Normal heart sounds.   Pulmonary:      Effort: Pulmonary effort is normal. No respiratory distress.      Breath sounds: Normal breath sounds. No wheezing.   Abdominal:      General: Bowel sounds are normal. There is no distension.      Palpations: Abdomen is soft.      Tenderness: There is no  abdominal tenderness.   Musculoskeletal:         General: No swelling. Normal range of motion.   Skin:     General: Skin is warm and dry.      Capillary Refill: Capillary refill takes 2 to 3 seconds.      Findings: No erythema.   Neurological:      Mental Status: She is alert and oriented to person, place, and time. Mental status is at baseline.   Psychiatric:         Mood and Affect: Mood normal.         Behavior: Behavior normal.       Significant Labs: ABGs: No results for input(s): PH, PCO2, HCO3, POCSATURATED, BE, TOTALHB, COHB, METHB, O2HB, POCFIO2, PO2 in the last 48 hours.  Blood Culture: No results for input(s): LABBLOO in the last 48 hours.  CBC:   Recent Labs   Lab 05/22/22  0513 05/23/22  0452   WBC 15.36* 10.89   HGB 11.0* 10.4*   HCT 33.6* 31.6*    196       CMP:   Recent Labs   Lab 05/21/22  1410 05/22/22  0513 05/23/22  0452    137 133*   K 3.5 3.6 3.6   CL 94* 93* 95   CO2 27 31* 26   * 121* 94   BUN 13 18 17   CREATININE 0.6 0.7 0.6   CALCIUM 9.2 9.0 8.4*   PROT 7.0 6.4 5.9*   ALBUMIN 3.7 3.5 3.1*   BILITOT 0.8 0.7 0.3   ALKPHOS 103 93 86   AST 25 25 21   ALT 24 30 24   ANIONGAP 16 13 12   EGFRNONAA >60 >60 >60       Lactic Acid:   No results for input(s): LACTATE in the last 48 hours.    Lipase:   No results for input(s): LIPASE in the last 48 hours.    Lipid Panel: No results for input(s): CHOL, HDL, LDLCALC, TRIG, CHOLHDL in the last 48 hours.  Magnesium:   Recent Labs   Lab 05/21/22  1410   MG 1.7       TSH: No results for input(s): TSH in the last 4320 hours.  Urine Culture: No results for input(s): LABURIN in the last 48 hours.  Urine Studies:   Recent Labs   Lab 05/21/22  1747   COLORU Yellow   APPEARANCEUA Clear   PHUR 7.0   SPECGRAV 1.020   PROTEINUA 2+*   GLUCUA Negative   KETONESU 1+*   BILIRUBINUA Negative   OCCULTUA Trace*   NITRITE Negative   UROBILINOGEN Negative   LEUKOCYTESUR Trace*   RBCUA 9*   WBCUA 1   BACTERIA Rare   HYALINECASTS 0         Significant  Imaging: I have reviewed all pertinent imaging results/findings within the past 24 hours.

## 2022-05-23 NOTE — DISCHARGE SUMMARY
[facility]  Discharge Summary      Patient Name: Annette Garcia  MRN: [id number]  Patient Class: IP- Inpatient  Admission Date: 5/20/2022  Hospital Length of Stay: 2 days  Discharge Date and Time: 5/23/2022  2:09 PM  Attending Physician: Alice Barker*   Discharging Provider: Alice Barker MD  Primary Care Provider: Jose Valles MD      HPI:   Annette Garcia is a 86-year-old female with a history of CAD, disorder of kidney and ureter, HTN, lone atrial fibrillation, seizures, hypothyroidism who presented to the ED from [facility] for evaluation of recurrent episodes of vomiting as well as nausea today. She has multiple medical comorbidities including ostomy. She notes that she has had some element of abdominal pain associated with the vomiting today.  he has not take anything that she knows of to help with the symptoms, she believe she has vomited at least 5 times reportedly 8. She can not recall any episodes like this in the past, nothing in particular seems to make it any better.       In the ED she vomited x8 given Zofran with relief. Per on call general surgery who will see in am. Hold NGT for vomiting. CT Abdomen/Pelvis: Single nonspecific dilated loop of small bowel without transition point.  Low-grade partial obstruction not excluded. Thickening of the wall of the distal esophagus with intraluminal contrast material concerning for reflux esophagitis. Further evaluation with EGD is recommended to exclude a neoplastic process. Tree-in-bud nodules in the right middle lobe and the lingula with mild bronchiectasis in the lingula. Findings are likely related to an atypical infectious process such as MAC. Admitted to [facility] for further evaluation.Admitted to [facility] for further care and bowel obstruction work up.      * No surgery found *      Hospital Course:   No notes on file     Goals of Care Treatment  Preferences:  Code Status: DNR    Health care agent: Beatris  Health care agent number: No value filed.    Living Will: Yes              Consults:   Consults (From admission, onward)          Status Ordering Provider     Inpatient consult to Cardiology-Jorgeschino  Once        Provider:  (Not yet assigned)    Completed GIGI-PAULA PA     IP consult to case management  Once        Provider:  (Not yet assigned)    Acknowledged RENE PATINO     General Surgery  Once        Provider:  (Not yet assigned)    Completed ELIA PAUL            * Partial small bowel obstruction  Slow transit constipation  Colostomy in place  From NH with N/V given zofran in ED with resolution for now  Ostomy site noted without stool or flatulence  CTAbd/pelvis: Single nonspecific dilated loop of small bowel without transition point. Low-grade partial obstruction not excluded. Thickening of the wall of the distal esophagus with intraluminal contrast material concerning for reflux esophagitis.  Further evaluation with EGD is recommended to exclude a neoplastic process. Tree-in-bud nodules in the right middle lobe and the lingula with mild bronchiectasis in the lingula.  Findings are likely related to an atypical infectious process such as MAC.  -NPO  -symptomatic management  -IVF  -replace electrolytes from vomiting  -appreciate GI recs-hold NGT for continuous vomiting    UTI (urinary tract infection)  UA positive   Cefazolin switch to Macrobid on discharge      Leukocytosis  WBC 16  Lactate normal  No indications of infection noted  UA positive - Cefazolin and switch to Macrobid on disxcharge    Colostomy in place  stable    Chronic anticoagulation  Resume eliquis at discharge      Paroxysmal atrial fibrillation  Chronic anticoagulation  Takes Eliquis  Resume and monitor    Advance care planning  Advance Care Planning     Date: 05/21/2022    Code Status  In light of the patients advanced and life limiting illness,I  engaged the the family in a conversation about the patient's preferences for care  at the very end of life. The patient wishes to have a natural, peaceful death.  Along those lines, the patient does not wish to have CPR or other invasive treatments performed when her heart and/or breathing stops. I communicated to the family that a DNR order would be placed in her medical record to reflect this preference.  I spent a total of 20 minutes engaging the patient in this advance care planning discussion.             Urinary retention  Patient has not voided since admission  bladder scan with 74 now with 200  Will give patient a chance to urinate on her own with out the help of gonzales or I & out cath  Monitor strict I/Os      Slow transit constipation  KUB with distal colon with stool.   Stool softener    Peripheral vascular disease, unspecified  Resume Statin       Anemia due to antineoplastic chemotherapy  Stable  monitor      Acquired hypothyroidism  Resume home meds      Epilepsy  seizure precautions  Trileptal level pending  phenobarbital level 7.9    Renovascular hypertension  History of stent insertion of left renal artery 7/19/17  Bilateral renal artery stenosis  Hypertensive on admission  Takes statin at home and denies BP med use-resume and monitor      Final Active Diagnoses:    Diagnosis Date Noted POA    PRINCIPAL PROBLEM:  Partial small bowel obstruction [K56.600] 05/21/2022 Yes    UTI (urinary tract infection) [N39.0] 05/22/2022 Yes    Leukocytosis [D72.829] 05/21/2022 Yes    Colostomy in place [Z93.3] 09/14/2020 Not Applicable    Chronic anticoagulation [Z79.01] 08/20/2020 Not Applicable    Chronic anemia [D64.9] 08/14/2020 Yes    Paroxysmal atrial fibrillation [I48.0] 12/28/2019 Yes     Chronic    Advance care planning [Z71.89] 11/22/2019 Not Applicable    Urinary retention [R33.9] 11/04/2019 Yes     Chronic    Peripheral vascular disease, unspecified [I73.9] 10/28/2019 Yes     Chronic    Slow transit  constipation [K59.01] 10/28/2019 Yes     Chronic    Bilateral renal artery stenosis [I70.1] 01/16/2017 Yes    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, T45.1X5A] 12/27/2016 Yes     Chronic    Acquired hypothyroidism [E03.9] 08/18/2016 Yes     Chronic    Epilepsy [G40.909] 08/18/2016 Yes     Chronic    Renovascular hypertension [I15.0] 08/18/2016 Yes     Chronic      Problems Resolved During this Admission:    Diagnosis Date Noted Date Resolved POA    History of stent insertion of left renal artery 7/19/17 [Z98.890] 07/19/2017 05/21/2022 Not Applicable     Chronic       Discharged Condition: stable    Disposition: Another Health Care Inst*    Follow Up:   Follow-up Information       Jose Valles MD Follow up in 1 week(s).    Specialty: Family Medicine  Contact information:  [address] [place] [id number] [phone]                           Patient Instructions:      Diet Cardiac     Activity as tolerated           Pending Diagnostic Studies:       Procedure Component Value Units Date/Time    Oxcarbazepine level [[id number]] Collected: 05/21/22 0609    Order Status: Sent Lab Status: In process Updated: 05/21/22 1549    Specimen: Blood            Medications:  Reconciled Home Medications:      Medication List        START taking these medications      losartan 50 MG tablet  Commonly known as: COZAAR  Take 1 tablet (50 mg total) by mouth once daily.     metoprolol tartrate 25 MG tablet  Commonly known as: LOPRESSOR  Take 1 tablet (25 mg total) by mouth 2 (two) times daily.     nitrofurantoin (macrocrystal-monohydrate) 100 MG capsule  Commonly known as: MACROBID  Take 1 capsule (100 mg total) by mouth 2 (two) times daily. for 10 days            CHANGE how you take these medications      polyethylene glycol 17 gram Pwpk  Commonly known as: GLYCOLAX  Take 17 g by mouth once daily.  What changed:   when to take this  reasons to take this            CONTINUE taking these medications      acetaminophen 325  MG tablet  Commonly known as: TYLENOL  Take 2 tablets (650 mg total) by mouth every 4 (four) hours as needed for Pain.     apixaban 2.5 mg Tab  Commonly known as: ELIQUIS  Take 1 tablet (2.5 mg total) by mouth 2 (two) times daily.     calcium-vitamin D 600 mg-10 mcg (400 unit) Tab  Take 1 tablet by mouth once daily.     CHLORASEPTIC 0.5 % Spra  Generic drug: phenoL  by Mucous Membrane route 2 (two) times daily as needed (sore throat).     levothyroxine 125 MCG tablet  Commonly known as: SYNTHROID  TAKE 1 TABLET (125 MCG TOTAL) BY MOUTH ONCE DAILY.     magnesium oxide 400 mg (241.3 mg magnesium) tablet  Commonly known as: MAG-OX  Take 2 tablets (800 mg total) by mouth once daily.     ondansetron 8 MG tablet  Commonly known as: ZOFRAN  Take 8 mg by mouth every 6 (six) hours as needed for Nausea (and vomiting).     OXcarbazepine 300 MG Tab  Commonly known as: TRILEPTAL  Take 150 mg by mouth nightly.     PHENobarbitaL 97.2 MG tablet  Commonly known as: LUMINAL  Take 1 tablet (97.2 mg total) by mouth every evening.     pravastatin 20 MG tablet  Commonly known as: PRAVACHOL  Take 20 mg by mouth once daily.     vitamin D 1000 units Tab  Commonly known as: VITAMIN D3  Take 1,000 Units by mouth once daily.            STOP taking these medications      heparin flush (porcine) 10 unit/mL injection  Commonly known as: heparin flush 10 units/mL              Indwelling Lines/Drains at time of discharge:   Lines/Drains/Airways       Drain  Duration                  Colostomy 10/29/19 1200  days                    Time spent on the discharge of patient: 35 minutes         Alice Barker MD  Department of Hospital Medicine  [facility]

## 2022-05-23 NOTE — ASSESSMENT & PLAN NOTE
87 yo F w/ prior ostomy admitted for suspected pSBO now doing well    Continue to advance diet as tolerated  Okay to DC from surgical perspective  Discharge with bowel regimen (colace, miralax)  Follow up with surgery clinic outpatient PRN

## 2022-05-23 NOTE — PLAN OF CARE
Eduard sent referral to [facility] via [facility], so therefore pt can return to [facility] upon d/c.     10:30 Eduard faxed facility transfer orders via careport.     Eduard will continue to follow pt for d/c planning.       05/23/22 7431   Post-Acute Status   Post-Acute Authorization Placement   Post-Acute Placement Status Referrals Sent   Coverage medicare Hospital Resources/Appts/Education Provided Appointments scheduled by Navigator/Coordinator   Discharge Delays None known at this time   Discharge Plan   Discharge Plan A Return to nursing home

## 2022-05-23 NOTE — PT/OT/SLP PROGRESS
Occupational Therapy   Treatment    Name: Annette Garcia  MRN: [id number]  Admitting Diagnosis:  Partial small bowel obstruction       Recommendations:     Discharge Recommendations: other (see comments) (return to [facility] with therapy services)  Discharge Equipment Recommendations:  other (see comments) (defer to NH)  Barriers to discharge:  None    Assessment:     Annette Garcia is a 86 y.o. female with a medical diagnosis of Partial small bowel obstruction.  She presents with The primary encounter diagnosis was Nausea and vomiting, intractability of vomiting not specified, unspecified vomiting type. Diagnoses of Partial small bowel obstruction, Chest pain, Right lower quadrant abdominal pain, and Tachycardia with hypertension were also pertinent to this visit. Performance deficits affecting function are weakness, impaired cardiopulmonary response to activity, impaired functional mobilty, gait instability, impaired endurance, impaired balance, decreased ROM, decreased upper extremity function, decreased lower extremity function, impaired self care skills.     Pt agreeable to therapy this date, progressing towards goals. Pt declining EOB/OOB ax at this time but agreeable to BUE exs seated up in bed. Pt requires MaxA x2 to scoot to HOB. Pt educated on/performing BUE exs with yellow theraband. Pt with no c/o of pain throughout session.     Rehab Prognosis:  Good and Fair; patient would benefit from acute skilled OT services to address these deficits and reach maximum level of function.       Plan:     Patient to be seen 2 x/week to address the above listed problems via self-care/home management, therapeutic activities, therapeutic exercises  · Plan of Care Expires: 06/22/22  · Plan of Care Reviewed with: patient    Subjective     Pain/Comfort:  · Pain Rating 1: 0/10  · Pain Rating Post-Intervention 1: 0/10    Objective:     Communicated with: ana prior to session.  Patient found HOB elevated with oxygen, bed  alarm, colostomy, peripheral IV upon OT entry to room.    General Precautions: Standard, fall   Orthopedic Precautions:N/A   Braces: N/A  Respiratory Status: Nasal cannula, flow 1 L/min     Occupational Performance:     Bed Mobility:    · Patient completed Scooting/Bridging with maximal assistance and 2 persons to HOB with use of draw sheet    Functional Mobility/Transfers:  · Pt declining EOB/OOB ax at this time    Activities of Daily Living:  · Pt declining ADLs, stating she has already done them this AM      [facility] 6 Click ADL: 14    Treatment & Education:  Pt agreeable to therapy this date, progressing towards goals.   Pt declining EOB/OOB ax at this time but agreeable to BUE exs seated up in bed.   Pt requires MaxA x2 to scoot to HOB.   Pt educated on/performing BUE exs with yellow theraband 5-8 reps each (horizontal abd, horizontal punches, shoulder flex, bicep curls, hand squeezes).   Pt with no c/o of pain throughout session.     Patient left HOB elevated with all lines intact, call button in reach, bed alarm on and nsg notifiedEducation:      GOALS:   Multidisciplinary Problems     Occupational Therapy Goals        Problem: Occupational Therapy    Goal Priority Disciplines Outcome Interventions   Occupational Therapy Goal     OT, PT/OT Ongoing, Progressing    Description: Goals to be met by: 6/22/2022     Patient will increase functional independence with ADLs by performing:    Feeding with Set-up Assistance.  Grooming while seated with Set-up Assistance.  Toileting from bedside commode with Stand-by Assistance for hygiene and clothing management.   Step transfer with Minimal Assistance & appropriate AD.  Toilet transfer to bedside commode with Minimal Assistance & appropriate AD.  Increased functional strength to WFL for self-care.                     Time Tracking:     OT Date of Treatment: 05/23/22  OT Start Time: 1110  OT Stop Time: 1125  OT Total Time (min): 15 min    Billable Minutes:Therapeutic  Exercise 15    OT/JEOVANNY: OT     JEOVANNY Visit Number: 0    5/23/2022

## 2022-05-23 NOTE — PROGRESS NOTES
[facility]  General Surgery  Progress Note    Subjective:     History of Present Illness:  No notes on file    Post-Op Info:  * No surgery found *         Interval History:   NAEON  Tolerating clears and fulls. No nausea, vomiting  Ostomy functioning     Medications:  Continuous Infusions:  Scheduled Meds:   bisacodyL  10 mg Rectal Daily    ceFAZolin (ANCEF) IVPB  500 mg Intravenous Q8H    docusate sodium  100 mg Oral BID    levothyroxine  125 mcg Oral Before breakfast    losartan  50 mg Oral Daily    magnesium oxide  400 mg Oral BID    metoprolol tartrate  25 mg Oral BID    OXcarbazepine  150 mg Oral Nightly    PHENobarbitaL  97.2 mg Oral QHS    polyethylene glycol  17 g Oral BID    pravastatin  20 mg Oral Daily    vitamin D  1,000 Units Oral Daily     PRN Meds:acetaminophen, albuterol-ipratropium, glucagon (human recombinant), glucose, glucose, labetalol, melatonin, naloxone, polyethylene glycol     Review of patient's allergies indicates:   Allergen Reactions    Adhesive Itching and Blisters    Penicillins Anaphylaxis    Tramadol Hives    Avelox [moxifloxacin] Rash     Facial and arm itching and redness. Pt states throat closes when given.    Amoxil [amoxicillin]     Aspridrox [aspirin, buffered]     Codeine Other (See Comments)     Throat swelling    Keflex [cephalexin]      Tolerated cefepime and cefazolin    Norvasc [amlodipine]     Red dye Hives    Robitussin [guaifenesin]     Sulfa (sulfonamide antibiotics)     Tylenol [acetaminophen]      Has reaction to Tylenol with red dye and unable to take Extra Strength Tylenol/ CAN ONLY TOLERATE REG STRENGTH TYLENOL    Vicks vaporub [camphor-eucalyptus oil-menthol]      Objective:     Vital Signs (Most Recent):  Temp: 98.4 °F (36.9 °C) (05/23/22 0755)  Pulse: 74 (05/23/22 0845)  Resp: 18 (05/23/22 0755)  BP: (!) 154/86 (05/23/22 0755)  SpO2: 95 % (05/23/22 0845)   Vital Signs (24h Range):  Temp:  [98.2 °F (36.8 °C)-98.6 °F (37 °C)]  98.4 °F (36.9 °C)  Pulse:  [] 74  Resp:  [18-22] 18  SpO2:  [95 %-98 %] 95 %  BP: (132-213)/(60-88) 154/86     Weight: 72.3 kg (159 lb 6.3 oz)  Body mass index is 33.31 kg/m².    Intake/Output - Last 3 Shifts         05/21 0700 05/22 0659 05/22 0700 05/23 0659 05/23 0700 05/24 0659    P.O.  520     IV Piggyback  98.8     Total Intake(mL/kg)  618.8 (8.6)     Urine (mL/kg/hr) 950 (0.5) 800 (0.5)     Emesis/NG output 600      Stool  400     Total Output 1550 1200     Net -1550 -581.2            Stool Occurrence  1 x             Physical Exam  Vitals and nursing note reviewed.   Constitutional:       Appearance: Normal appearance.   HENT:      Head: Normocephalic and atraumatic.   Cardiovascular:      Rate and Rhythm: Normal rate and regular rhythm.   Pulmonary:      Effort: Pulmonary effort is normal. No respiratory distress.   Abdominal:      General: Abdomen is flat.      Palpations: Abdomen is soft.      Comments: LLQ stoma with stool in appliance    Skin:     General: Skin is warm and dry.      Findings: Bruising present.   Neurological:      General: No focal deficit present.      Mental Status: She is alert.   Psychiatric:         Mood and Affect: Mood normal.         Behavior: Behavior normal.       Significant Labs:  CBC:   Recent Labs   Lab 05/23/22  0452   WBC 10.89   RBC 3.23*   HGB 10.4*   HCT 31.6*      MCV 98   MCH 32.2*   MCHC 32.9     CMP:   Recent Labs   Lab 05/23/22  0452   GLU 94   CALCIUM 8.4*   ALBUMIN 3.1*   PROT 5.9*   *   K 3.6   CO2 26   CL 95   BUN 17   CREATININE 0.6   ALKPHOS 86   ALT 24   AST 21   BILITOT 0.3       Significant Diagnostics:  I have reviewed all pertinent imaging results/findings within the past 24 hours.    Assessment/Plan:     * Partial small bowel obstruction  87 yo F w/ prior ostomy admitted for suspected pSBO now doing well    Continue to advance diet as tolerated  Okay to DC from surgical perspective  Discharge with bowel regimen (colace,  miralax)  Follow up with surgery clinic outpatient AMY Salazar MD  General Surgery  [facility]

## 2022-05-25 LAB — OXCARBAZEPINE METABOLITE: 2 MCG/ML (ref 10–35)

## 2022-05-25 NOTE — PHYSICIAN QUERY
PT Name: Annette Garcia  MR #: [id number]     DOCUMENTATION CLARIFICATION     CDS Andie Morris RN, BSN        Contact Information:    [email or web address]         This form is a permanent document in the medical record.     Query Date: May 25, 2022    By submitting this query, we are merely seeking further clarification of documentation to reflect the severity of illness of your patient. Please utilize your independent clinical judgment when addressing the question(s) below.    The medical record reflects the following:     Indicators   Supporting Clinical Findings Location in Medical Record   X   Bowel obstruction, intestinal obstruction, LBO or SBO documented Principal Problem:    Partial small bowel obstruction    86-year-old female with a history of CAD, disorder of kidney and ureter, HTN, lone atrial fibrillation, seizures, hypothyroidism who presented to the ED from [facility] for evaluation of recurrent episodes of vomiting as well as nausea today. She has multiple medical comorbidities including ostomy 5/21 H&P   X   Radiology findings Impression:     1. Single nonspecific dilated loop of small bowel without transition point.  Low-grade partial obstruction not excluded.  2. Thickening of the wall of the distal esophagus with intraluminal contrast material concerning for reflux esophagitis.  Further evaluation with EGD is recommended to exclude a neoplastic process.  3. Tree-in-bud nodules in the right middle lobe and the lingula with mild bronchiectasis in the lingula.  Findings are likely related to an atypical infectious process such as MAC. 5/20 CT abdomen   X   Treatment/Medication -NPO  -symptomatic management  -IVF  -replace electrolytes from vomiting 5/21 H&P    Procedure/Surgery      Other                     Provider, please further specify the bowel obstruction diagnosis:    [ x  ] Partial or incomplete intestinal obstruction, due to adhesions     [   ] Partial or incomplete  intestinal obstruction, due to other (please specify): ____________     [   ] Partial or incomplete intestinal obstruction, unknown or unspecified etiology     [   ] Other intestinal condition (please specify): _____________________     [   ]  Clinically Undetermined           Please document in your progress notes daily for the duration of treatment until resolved, and include in your discharge summary.

## 2022-06-05 ENCOUNTER — EXTERNAL HOSPITAL ADMISSION (OUTPATIENT)
Dept: SKILLED NURSING FACILITY | Facility: HOSPITAL | Age: 87
End: 2022-06-05
Payer: MEDICARE

## 2022-06-05 DIAGNOSIS — K56.600 PARTIAL SMALL BOWEL OBSTRUCTION: Primary | ICD-10-CM

## 2022-06-05 PROCEDURE — 99306 1ST NF CARE HIGH MDM 50: CPT | Mod: ,,, | Performed by: INTERNAL MEDICINE

## 2022-06-05 PROCEDURE — 99306 PR NURSING FACILITY CARE, INIT, HIGH SEVERITY: ICD-10-PCS | Mod: ,,, | Performed by: INTERNAL MEDICINE

## 2022-06-05 NOTE — PROGRESS NOTES
[facility]   New Visit Progress Note   Recent Hospital Discharge     PRESENTING HISTORY     Chief Complaint/Reason for Admission:  Follow up Hospital Discharge   PCP: Jose Valles MD    History of Present Illness:  Ms. Annette Garcia is a 86 y.o. female who was recently admitted to the hospital.Annette Garcia is a 86-year-old female with a history of CAD, disorder of kidney and ureter, HTN, lone atrial fibrillation, seizures, hypothyroidism who presented to the ED from [facility] for evaluation of recurrent episodes of vomiting as well as nausea today. She has multiple medical comorbidities including ostomy. She notes that she has had some element of abdominal pain associated with the vomiting today.  he has not take anything that she knows of to help with the symptoms, she believe she has vomited at least 5 times reportedly 8. She can not recall any episodes like this in the past, nothing in particular seems to make it any better.              ___________________________________________________________________    Today: recent admit partial SBO, conservative treatment overall I have seen a decline. DNR        Review of Systems  General ROS: negative for chills, fever or weight loss  Psychological ROS: negative for hallucination, depression or suicidal ideation  Ophthalmic ROS: negative for blurry vision, photophobia or eye pain  ENT ROS: negative for epistaxis, sore throat or rhinorrhea  Respiratory ROS: no cough, shortness of breath, or wheezing  Cardiovascular ROS: no chest pain or dyspnea on exertion  Gastrointestinal ROS: no abdominal pain, change in bowel habits, or black/ bloody stools  Genito-Urinary ROS: no dysuria, trouble voiding, or hematuria  Musculoskeletal ROS: negative for gait disturbance or muscular weakness  Neurological ROS: no syncope or seizures; no ataxia  Dermatological ROS: negative for pruritis, rash and jaundice          PAST HISTORY:     Past  Medical History:   Diagnosis Date    Allergy     Cancer     colon    Coronary artery disease 8/14/2020    Disorder of kidney and ureter     E coli bacteremia 10/29/2019    Hypertension     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    In the setting of septic shock and near death    Petit mal epilepsy 1954    Scoliosis of lumbar spine     Seizures     Unspecified hypothyroidism     UTI (urinary tract infection) 5/22/2022       Past Surgical History:   Procedure Laterality Date    APPENDECTOMY      BACK SURGERY  1988    vertebral fracture    BACK SURGERY  02/2013    lumbar L2-5    CATARACT EXTRACTION, BILATERAL Bilateral     CHOLECYSTECTOMY      open    EYE SURGERY Bilateral     cataract removal with lens implant    HYSTERECTOMY      PERCUTANEOUS TRANSLUMINAL ANGIOPLASTY (PTA) OF PERIPHERAL VESSEL N/A 2/3/2020    Procedure: PTA, PERIPHERAL VESSEL;  Surgeon: Timmy Simmons MD;  Location: [facility] CATH LAB/EP;  Service: Cardiology;  Laterality: N/A;    PORTACATH PLACEMENT Right 09/2016    RENAL ARTERY STENT Left 07/19/2017    SIGMOIDECTOMY  10/29/2019    SMALL INTESTINE SURGERY  08/23/2016    THROMBECTOMY N/A 2/3/2020    Procedure: THROMBECTOMY;  Surgeon: Timmy Simmons MD;  Location: [facility] CATH LAB/EP;  Service: Cardiology;  Laterality: N/A;    TONSILLECTOMY      VAGINAL HYSTERECTOMY W/ ANTERIOR AND POSTERIOR VAGINAL REPAIR         Family History   Problem Relation Age of Onset    Pancreatic cancer Mother     Hypertension Father     Heart attack Father          MEDICATIONS & ALLERGIES:     Current Outpatient Medications on File Prior to Visit   Medication Sig Dispense Refill    acetaminophen (TYLENOL) 325 MG tablet Take 2 tablets (650 mg total) by mouth every 4 (four) hours as needed for Pain.  0    apixaban (ELIQUIS) 2.5 mg Tab Take 1 tablet (2.5 mg total) by mouth 2 (two) times daily.      calcium-vitamin D 600 mg(1,500mg) -400 unit Tab Take 1 tablet by mouth once daily.       levothyroxine (SYNTHROID) 125 MCG tablet TAKE 1 TABLET (125 MCG TOTAL) BY MOUTH ONCE DAILY. 90 tablet 3    losartan (COZAAR) 50 MG tablet Take 1 tablet (50 mg total) by mouth once daily. 90 tablet 3    magnesium oxide (MAG-OX) 400 mg (241.3 mg magnesium) tablet Take 2 tablets (800 mg total) by mouth once daily.  0    metoprolol tartrate (LOPRESSOR) 25 MG tablet Take 1 tablet (25 mg total) by mouth 2 (two) times daily. 60 tablet 11    ondansetron (ZOFRAN) 8 MG tablet Take 8 mg by mouth every 6 (six) hours as needed for Nausea (and vomiting).      OXcarbazepine (TRILEPTAL) 300 MG Tab Take 150 mg by mouth nightly.      PHENobarbitaL (LUMINAL) 97.2 MG tablet Take 1 tablet (97.2 mg total) by mouth every evening. 30 tablet 5    phenoL (CHLORASEPTIC) 0.5 % SprA by Mucous Membrane route 2 (two) times daily as needed (sore throat).      polyethylene glycol (GLYCOLAX) 17 gram PwPk Take 17 g by mouth once daily. 30 packet 5    pravastatin (PRAVACHOL) 20 MG tablet Take 20 mg by mouth once daily.      vitamin D (VITAMIN D3) 1000 units Tab Take 1,000 Units by mouth once daily.       No current facility-administered medications on file prior to visit.        Review of patient's allergies indicates:   Allergen Reactions    Adhesive Itching and Blisters    Penicillins Anaphylaxis    Tramadol Hives    Avelox [moxifloxacin] Rash     Facial and arm itching and redness. Pt states throat closes when given.    Amoxil [amoxicillin]     Aspridrox [aspirin, buffered]     Codeine Other (See Comments)     Throat swelling    Keflex [cephalexin]      Tolerated cefepime and cefazolin    Norvasc [amlodipine]     Red dye Hives    Robitussin [guaifenesin]     Sulfa (sulfonamide antibiotics)     Tylenol [acetaminophen]      Has reaction to Tylenol with red dye and unable to take Extra Strength Tylenol/ CAN ONLY TOLERATE REG STRENGTH TYLENOL    Vicks vaporub [camphor-eucalyptus oil-menthol]        OBJECTIVE:     Vital  Signs:  LMP  (LMP Unknown)   Wt Readings from Last 1 Encounters:   05/21/22 0551 72.3 kg (159 lb 6.3 oz)   05/21/22 0138 72.3 kg (159 lb 6.3 oz)     There is no height or weight on file to calculate BMI.        Physical Exam:  LMP  (LMP Unknown)   General appearance: alert, cooperative, no distress  Constitutional:Oriented to person, place, and time  + appears well-developed and well-nourished.   HEENT: Normocephalic, atraumatic, neck symmetrical, no nasal discharge   Eyes: conjunctivae/corneas clear, PERRL, EOM's intact  Lungs: clear to auscultation bilaterally, no dullness to percussion bilaterally  Heart: regular rate and rhythm without rub; no displacement of the PMI   Abdomen: soft, non-tender; bowel sounds normoactive; no organomegaly  Extremities: extremities symmetric; no clubbing, cyanosis, or edema  Integument: Skin color, texture, turgor normal; no rashes; hair distrubution normal  Neurologic: Alert and oriented X 3, normal strength, normal coordination and gait  Psychiatric: no pressured speech; normal affect; no evidence of impaired cognition     Laboratory  Lab Results   Component Value Date    WBC 10.89 05/23/2022    HGB 10.4 (L) 05/23/2022    HCT 31.6 (L) 05/23/2022    MCV 98 05/23/2022     05/23/2022     BMP  Lab Results   Component Value Date     (L) 05/23/2022    K 3.6 05/23/2022    CL 95 05/23/2022    CO2 26 05/23/2022    BUN 17 05/23/2022    CREATININE 0.6 05/23/2022    CALCIUM 8.4 (L) 05/23/2022    ANIONGAP 12 05/23/2022    ESTGFRAFRICA >60 05/23/2022    EGFRNONAA >60 05/23/2022     Lab Results   Component Value Date    ALT 24 05/23/2022    AST 21 05/23/2022    ALKPHOS 86 05/23/2022    BILITOT 0.3 05/23/2022     Lab Results   Component Value Date    INR 1.0 01/31/2020    INR 1.0 01/31/2020    INR 0.9 01/30/2020     No results found for: HGBA1C    Diagnostic Results:        TRANSITION OF CARE:     .     ASSESSMENT & PLAN:     HIGH RISK CONDITION(S):  Partial small bowel  obstruction  Slow transit constipation  Colostomy in place  From NH with N/V given zofran in ED with resolution for now  Ostomy site noted without stool or flatulence  CTAbd/pelvis: Single nonspecific dilated loop of small bowel without transition point. Low-grade partial obstruction not excluded. Thickening of the wall of the distal esophagus with intraluminal contrast material concerning for reflux esophagitis.  Further evaluation with EGD is recommended to exclude a neoplastic process. Tree-in-bud nodules in the right middle lobe and the lingula with mild bronchiectasis in the lingula.  Findings are likely related to an atypical infectious process such as MAC.  -NPO  -symptomatic management  -IVF  -replace electrolytes from vomiting  -appreciate GI recs-hold NGT for continuous vomiting     UTI (urinary tract infection)  UA positive   Cefazolin        Leukocytosis  WBC 16  Lactate normal  No indications of infection noted  UA pending        Chronic anemia  Stable  monitor        Paroxysmal atrial fibrillation  Chronic anticoagulation  Takes Eliquis  Resume and monitor     Advance care planning  Advance Care Planning      Date: 05/21/2022     Code Status  In light of the patients advanced and life limiting illness,I engaged the the family in a conversation about the patient's preferences for care  at the very end of life. The patient wishes to have a natural, peaceful death.  Along those lines, the patient does not wish to have CPR or other invasive treatments performed when her heart and/or breathing stops. I communicated to the family that a DNR order would be placed in her medical record to reflect this preference.  I spent a total of 20 minutes engaging the patient in this advance care planning discussion.  Instructions for the patient:      Scheduled Follow-up :  No future appointments.    Post Visit Medication List:     Medication List          Accurate as of June 5, 2022 10:58 AM. If you have any questions,  ask your nurse or doctor.            CONTINUE taking these medications    acetaminophen 325 MG tablet  Commonly known as: TYLENOL  Take 2 tablets (650 mg total) by mouth every 4 (four) hours as needed for Pain.     apixaban 2.5 mg Tab  Commonly known as: ELIQUIS  Take 1 tablet (2.5 mg total) by mouth 2 (two) times daily.     calcium-vitamin D 600 mg-10 mcg (400 unit) Tab     CHLORASEPTIC 0.5 % Spra  Generic drug: phenoL     levothyroxine 125 MCG tablet  Commonly known as: SYNTHROID  TAKE 1 TABLET (125 MCG TOTAL) BY MOUTH ONCE DAILY.     losartan 50 MG tablet  Commonly known as: COZAAR  Take 1 tablet (50 mg total) by mouth once daily.     magnesium oxide 400 mg (241.3 mg magnesium) tablet  Commonly known as: MAG-OX  Take 2 tablets (800 mg total) by mouth once daily.     metoprolol tartrate 25 MG tablet  Commonly known as: LOPRESSOR  Take 1 tablet (25 mg total) by mouth 2 (two) times daily.     ondansetron 8 MG tablet  Commonly known as: ZOFRAN     OXcarbazepine 300 MG Tab  Commonly known as: TRILEPTAL     PHENobarbitaL 97.2 MG tablet  Commonly known as: LUMINAL  Take 1 tablet (97.2 mg total) by mouth every evening.     polyethylene glycol 17 gram Pwpk  Commonly known as: GLYCOLAX  Take 17 g by mouth once daily.     pravastatin 20 MG tablet  Commonly known as: PRAVACHOL     vitamin D 1000 units Tab  Commonly known as: VITAMIN D3            Signing Physician:  Jese Booker MD

## 2022-06-13 RX ORDER — PHENOBARBITAL 97.2 MG/1
97.2 TABLET ORAL NIGHTLY
Qty: 30 TABLET | Refills: 5 | Status: SHIPPED | OUTPATIENT
Start: 2022-06-13 | End: 2022-08-12

## 2022-07-10 ENCOUNTER — EXTERNAL HOSPITAL ADMISSION (OUTPATIENT)
Dept: SKILLED NURSING FACILITY | Facility: HOSPITAL | Age: 87
End: 2022-07-10
Payer: MEDICARE

## 2022-07-10 DIAGNOSIS — K56.600 PARTIAL SMALL BOWEL OBSTRUCTION: Primary | ICD-10-CM

## 2022-07-10 PROCEDURE — 99308 SBSQ NF CARE LOW MDM 20: CPT | Mod: ,,, | Performed by: INTERNAL MEDICINE

## 2022-07-10 PROCEDURE — 99308 PR NURSING FAC CARE, SUBSEQ, MINOR COMPLIC: ICD-10-PCS | Mod: ,,, | Performed by: INTERNAL MEDICINE

## 2022-07-10 NOTE — PROGRESS NOTES
[facility]   New Visit Progress Note   Recent Hospital Discharge     PRESENTING HISTORY     Chief Complaint/Reason for Admission:  Follow up Hospital Discharge   PCP: Jose Valles MD    History of Present Illness:  Ms. Annette Garcia is a 86 y.o. female who was recently admitted to the hospital.  Ms. Annette Garcia is a 86 y.o. female who was recently admitted to the hospital.Annette Garcia is a 86-year-old female with a history of CAD, disorder of kidney and ureter, HTN, lone atrial fibrillation, seizures, hypothyroidism who presented to the ED from [facility] for evaluation of recurrent episodes of vomiting as well as nausea today. She has multiple medical comorbidities including ostomy. She notes that she has had some element of abdominal pain associated with the vomiting today.  he has not take anything that she knows of to help with the symptoms, she believe she has vomited at least 5 times reportedly 8. She can not recall any episodes like this in the past, nothing in particular seems to make it any better.           ___________________________________________________________________    Today: seen today in pT , doing better, care dicussed with daughters whow ere visiting.        Review of Systems  General ROS: negative for chills, fever or weight loss  Psychological ROS: negative for hallucination, depression or suicidal ideation  Ophthalmic ROS: negative for blurry vision, photophobia or eye pain  ENT ROS: negative for epistaxis, sore throat or rhinorrhea  Respiratory ROS: no cough, shortness of breath, or wheezing  Cardiovascular ROS: no chest pain or dyspnea on exertion  Gastrointestinal ROS: no abdominal pain, change in bowel habits, or black/ bloody stools  Genito-Urinary ROS: no dysuria, trouble voiding, or hematuria  Musculoskeletal ROS: negative for gait disturbance or muscular weakness  Neurological ROS: no syncope or seizures; no  ataxia  Dermatological ROS: negative for pruritis, rash and jaundice          PAST HISTORY:     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    In the setting of septic shock and near death    Petit mal epilepsy 1954    Scoliosis of lumbar spine     Seizures     Unspecified hypothyroidism     UTI (urinary tract infection) 5/22/2022       Past Surgical History:   Procedure Laterality Date    APPENDECTOMY      BACK SURGERY  1988    vertebral fracture    BACK SURGERY  02/2013    lumbar L2-5    CATARACT EXTRACTION, BILATERAL Bilateral     CHOLECYSTECTOMY      open    EYE SURGERY Bilateral     cataract removal with lens implant    HYSTERECTOMY      PERCUTANEOUS TRANSLUMINAL ANGIOPLASTY (PTA) OF PERIPHERAL VESSEL N/A 2/3/2020    Procedure: PTA, PERIPHERAL VESSEL;  Surgeon: Timmy Simmons MD;  Location: [facility] CATH LAB/EP;  Service: Cardiology;  Laterality: N/A;    PORTACATH PLACEMENT Right 09/2016    RENAL ARTERY STENT Left 07/19/2017    SIGMOIDECTOMY  10/29/2019    SMALL INTESTINE SURGERY  08/23/2016    THROMBECTOMY N/A 2/3/2020    Procedure: THROMBECTOMY;  Surgeon: Timmy Simmons MD;  Location: [facility] CATH LAB/EP;  Service: Cardiology;  Laterality: N/A;    TONSILLECTOMY      VAGINAL HYSTERECTOMY W/ ANTERIOR AND POSTERIOR VAGINAL REPAIR         Family History   Problem Relation Age of Onset    Pancreatic cancer Mother     Hypertension Father     Heart attack Father          MEDICATIONS & ALLERGIES:     Current Outpatient Medications on File Prior to Visit   Medication Sig Dispense Refill    acetaminophen (TYLENOL) 325 MG tablet Take 2 tablets (650 mg total) by mouth every 4 (four) hours as needed for Pain.  0    apixaban (ELIQUIS) 2.5 mg Tab Take 1 tablet (2.5 mg total) by mouth 2 (two) times daily.       calcium-vitamin D 600 mg(1,500mg) -400 unit Tab Take 1 tablet by mouth once daily.      levothyroxine (SYNTHROID) 125 MCG tablet TAKE 1 TABLET (125 MCG TOTAL) BY MOUTH ONCE DAILY. 90 tablet 3    losartan (COZAAR) 50 MG tablet Take 1 tablet (50 mg total) by mouth once daily. 90 tablet 3    magnesium oxide (MAG-OX) 400 mg (241.3 mg magnesium) tablet Take 2 tablets (800 mg total) by mouth once daily.  0    metoprolol tartrate (LOPRESSOR) 25 MG tablet Take 1 tablet (25 mg total) by mouth 2 (two) times daily. 60 tablet 11    ondansetron (ZOFRAN) 8 MG tablet Take 8 mg by mouth every 6 (six) hours as needed for Nausea (and vomiting).      OXcarbazepine (TRILEPTAL) 300 MG Tab Take 150 mg by mouth nightly.      PHENobarbitaL (LUMINAL) 97.2 MG tablet Take 1 tablet (97.2 mg total) by mouth every evening. 30 tablet 5    phenoL (CHLORASEPTIC) 0.5 % SprA by Mucous Membrane route 2 (two) times daily as needed (sore throat).      polyethylene glycol (GLYCOLAX) 17 gram PwPk Take 17 g by mouth once daily. 30 packet 5    pravastatin (PRAVACHOL) 20 MG tablet Take 20 mg by mouth once daily.      traMADoL (ULTRAM) 50 mg tablet Take 1 tablet (50 mg total) by mouth every 12 (twelve) hours as needed for Pain. 90 tablet 0    vitamin D (VITAMIN D3) 1000 units Tab Take 1,000 Units by mouth once daily.       No current facility-administered medications on file prior to visit.        Review of patient's allergies indicates:   Allergen Reactions    Adhesive Itching and Blisters    Penicillins Anaphylaxis    Tramadol Hives    Avelox [moxifloxacin] Rash     Facial and arm itching and redness. Pt states throat closes when given.    Amoxil [amoxicillin]     Aspridrox [aspirin, buffered]     Codeine Other (See Comments)     Throat swelling    Keflex [cephalexin]      Tolerated cefepime and cefazolin    Norvasc [amlodipine]     Red dye Hives    Robitussin [guaifenesin]     Sulfa (sulfonamide antibiotics)     Tylenol  [acetaminophen]      Has reaction to Tylenol with red dye and unable to take Extra Strength Tylenol/ CAN ONLY TOLERATE REG STRENGTH TYLENOL    Vicks vaporub [camphor-eucalyptus oil-menthol]        OBJECTIVE:     Vital Signs:  LMP  (LMP Unknown)   Wt Readings from Last 1 Encounters:   05/21/22 0551 72.3 kg (159 lb 6.3 oz)   05/21/22 0138 72.3 kg (159 lb 6.3 oz)     There is no height or weight on file to calculate BMI.        Physical Exam:  LMP  (LMP Unknown)   General appearance: alert, cooperative, no distress  Constitutional:Oriented to person, place, and time  + appears well-developed and well-nourished.   HEENT: Normocephalic, atraumatic, neck symmetrical, no nasal discharge   Eyes: conjunctivae/corneas clear, PERRL, EOM's intact  Lungs: clear to auscultation bilaterally, no dullness to percussion bilaterally  Heart: regular rate and rhythm without rub; no displacement of the PMI   Abdomen: soft, non-tender; bowel sounds normoactive; no organomegaly  Extremities: extremities symmetric; no clubbing, cyanosis, or edema  Integument: Skin color, texture, turgor normal; no rashes; hair distrubution normal  Neurologic: Alert and oriented X 3, normal strength, normal coordination and gait  Psychiatric: no pressured speech; normal affect; no evidence of impaired cognition     Laboratory  Lab Results   Component Value Date    WBC 10.89 05/23/2022    HGB 10.4 (L) 05/23/2022    HCT 31.6 (L) 05/23/2022    MCV 98 05/23/2022     05/23/2022     BMP  Lab Results   Component Value Date     (L) 05/23/2022    K 3.6 05/23/2022    CL 95 05/23/2022    CO2 26 05/23/2022    BUN 17 05/23/2022    CREATININE 0.6 05/23/2022    CALCIUM 8.4 (L) 05/23/2022    ANIONGAP 12 05/23/2022    ESTGFRAFRICA >60 05/23/2022    EGFRNONAA >60 05/23/2022     Lab Results   Component Value Date    ALT 24 05/23/2022    AST 21 05/23/2022    ALKPHOS 86 05/23/2022    BILITOT 0.3 05/23/2022     Lab Results   Component Value Date    INR 1.0  01/31/2020    INR 1.0 01/31/2020    INR 0.9 01/30/2020     No results found for: HGBA1C    Diagnostic Results:        TRANSITION OF CARE:         ASSESSMENT & PLAN:     HIGH RISK CONDITION(S):  Partial small bowel obstruction  Slow transit constipation  Colostomy in place  From NH with N/V given zofran in ED with resolution for now  Ostomy site noted without stool or flatulence  CTAbd/pelvis: Single nonspecific dilated loop of small bowel without transition point. Low-grade partial obstruction not excluded. Thickening of the wall of the distal esophagus with intraluminal contrast material concerning for reflux esophagitis.  Further evaluation with EGD is recommended to exclude a neoplastic process. Tree-in-bud nodules in the right middle lobe and the lingula with mild bronchiectasis in the lingula.  Findings are likely related to an atypical infectious process such as MAC.  -NPO  -symptomatic management  -IVF  -replace electrolytes from vomiting  -appreciate GI recs-hold NGT for continuous vomiting     UTI (urinary tract infection)  UA positive   Cefazolin        Leukocytosis  WBC 16  Lactate normal  No indications of infection noted  UA pending        Chronic anemia  Stable  monitor        Paroxysmal atrial fibrillation  Chronic anticoagulation  Takes Eliquis  Resume and monitor  Instructions for the patient:      Scheduled Follow-up :  No future appointments.    Post Visit Medication List:     Medication List          Accurate as of July 10, 2022  5:59 PM. If you have any questions, ask your nurse or doctor.            CONTINUE taking these medications    acetaminophen 325 MG tablet  Commonly known as: TYLENOL  Take 2 tablets (650 mg total) by mouth every 4 (four) hours as needed for Pain.     apixaban 2.5 mg Tab  Commonly known as: ELIQUIS  Take 1 tablet (2.5 mg total) by mouth 2 (two) times daily.     calcium-vitamin D 600 mg-10 mcg (400 unit) Tab     CHLORASEPTIC 0.5 % Spra  Generic drug: phenoL      levothyroxine 125 MCG tablet  Commonly known as: SYNTHROID  TAKE 1 TABLET (125 MCG TOTAL) BY MOUTH ONCE DAILY.     losartan 50 MG tablet  Commonly known as: COZAAR  Take 1 tablet (50 mg total) by mouth once daily.     magnesium oxide 400 mg (241.3 mg magnesium) tablet  Commonly known as: MAG-OX  Take 2 tablets (800 mg total) by mouth once daily.     metoprolol tartrate 25 MG tablet  Commonly known as: LOPRESSOR  Take 1 tablet (25 mg total) by mouth 2 (two) times daily.     ondansetron 8 MG tablet  Commonly known as: ZOFRAN     OXcarbazepine 300 MG Tab  Commonly known as: TRILEPTAL     PHENobarbitaL 97.2 MG tablet  Commonly known as: LUMINAL  Take 1 tablet (97.2 mg total) by mouth every evening.     polyethylene glycol 17 gram Pwpk  Commonly known as: GLYCOLAX  Take 17 g by mouth once daily.     pravastatin 20 MG tablet  Commonly known as: PRAVACHOL     traMADoL 50 mg tablet  Commonly known as: ULTRAM  Take 1 tablet (50 mg total) by mouth every 12 (twelve) hours as needed for Pain.     vitamin D 1000 units Tab  Commonly known as: VITAMIN D3            Signing Physician:  Jese Booker MD

## 2022-08-04 ENCOUNTER — EXTERNAL HOSPITAL ADMISSION (OUTPATIENT)
Dept: SKILLED NURSING FACILITY | Facility: HOSPITAL | Age: 87
End: 2022-08-04
Payer: MEDICARE

## 2022-08-04 DIAGNOSIS — Z93.3 COLOSTOMY IN PLACE: Primary | ICD-10-CM

## 2022-08-04 PROCEDURE — 99309 PR NURSING FAC CARE, SUBSEQ, SIGNIF COMPLIC: ICD-10-PCS | Mod: ,,, | Performed by: INTERNAL MEDICINE

## 2022-08-04 PROCEDURE — 99309 SBSQ NF CARE MODERATE MDM 30: CPT | Mod: ,,, | Performed by: INTERNAL MEDICINE

## 2022-08-04 NOTE — PROGRESS NOTES
[facility]   New Visit Progress Note   Recent Hospital Discharge     PRESENTING HISTORY     Chief Complaint/Reason for Admission:  Follow up Hospital Discharge   PCP: Jose Valles MD    History of Present Illness:  Ms. Annette Garcia is a 86 y.o. female who was recently admitted to the hospital.   86 y.o. female who was recently admitted to the hospital.Annette Garcia is a 86-year-old female with a history of CAD, disorder of kidney and ureter, HTN, lone atrial fibrillation, seizures, hypothyroidism who presented to the ED from [facility] for evaluation of recurrent episodes of vomiting as well as nausea today. She has multiple medical comorbidities including ostomy. She notes that she has had some element of abdominal pain associated with the vomiting today.  he has not take anything that she knows of to help with the symptoms, she believe she has vomited at least 5 times reportedly 8. She can not recall any episodes like this in the past, nothing in particular seems to make it any better.           ___________________________________________________________________    Today: Has some redness around colostomy site, No evidence of infection - will follow for now.        Review of Systems  General ROS: negative for chills, fever or weight loss  Psychological ROS: negative for hallucination, depression or suicidal ideation  Ophthalmic ROS: negative for blurry vision, photophobia or eye pain  ENT ROS: negative for epistaxis, sore throat or rhinorrhea  Respiratory ROS: no cough, shortness of breath, or wheezing  Cardiovascular ROS: no chest pain or dyspnea on exertion  Gastrointestinal ROS: no abdominal pain, change in bowel habits, or black/ bloody stools  Genito-Urinary ROS: no dysuria, trouble voiding, or hematuria  Musculoskeletal ROS: negative for gait disturbance or muscular weakness  Neurological ROS: no syncope or seizures; no ataxia  Dermatological ROS: negative for  pruritis, rash and jaundice          PAST HISTORY:     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    In the setting of septic shock and near death    Petit mal epilepsy 1954    Scoliosis of lumbar spine     Seizures     Unspecified hypothyroidism     UTI (urinary tract infection) 5/22/2022       Past Surgical History:   Procedure Laterality Date    APPENDECTOMY      BACK SURGERY  1988    vertebral fracture    BACK SURGERY  02/2013    lumbar L2-5    CATARACT EXTRACTION, BILATERAL Bilateral     CHOLECYSTECTOMY      open    EYE SURGERY Bilateral     cataract removal with lens implant    HYSTERECTOMY      PERCUTANEOUS TRANSLUMINAL ANGIOPLASTY (PTA) OF PERIPHERAL VESSEL N/A 2/3/2020    Procedure: PTA, PERIPHERAL VESSEL;  Surgeon: Timmy Simmons MD;  Location: [facility] CATH LAB/EP;  Service: Cardiology;  Laterality: N/A;    PORTACATH PLACEMENT Right 09/2016    RENAL ARTERY STENT Left 07/19/2017    SIGMOIDECTOMY  10/29/2019    SMALL INTESTINE SURGERY  08/23/2016    THROMBECTOMY N/A 2/3/2020    Procedure: THROMBECTOMY;  Surgeon: Timmy Simmons MD;  Location: [facility] CATH LAB/EP;  Service: Cardiology;  Laterality: N/A;    TONSILLECTOMY      VAGINAL HYSTERECTOMY W/ ANTERIOR AND POSTERIOR VAGINAL REPAIR         Family History   Problem Relation Age of Onset    Pancreatic cancer Mother     Hypertension Father     Heart attack Father          MEDICATIONS & ALLERGIES:     Current Outpatient Medications on File Prior to Visit   Medication Sig Dispense Refill    acetaminophen (TYLENOL) 325 MG tablet Take 2 tablets (650 mg total) by mouth every 4 (four) hours as needed for Pain.  0    apixaban (ELIQUIS) 2.5 mg Tab Take 1 tablet (2.5 mg total) by mouth 2 (two) times daily.      calcium-vitamin D 600 mg(1,500mg) -400  unit Tab Take 1 tablet by mouth once daily.      levothyroxine (SYNTHROID) 125 MCG tablet TAKE 1 TABLET (125 MCG TOTAL) BY MOUTH ONCE DAILY. 90 tablet 3    losartan (COZAAR) 50 MG tablet Take 1 tablet (50 mg total) by mouth once daily. 90 tablet 3    magnesium oxide (MAG-OX) 400 mg (241.3 mg magnesium) tablet Take 2 tablets (800 mg total) by mouth once daily.  0    metoprolol tartrate (LOPRESSOR) 25 MG tablet Take 1 tablet (25 mg total) by mouth 2 (two) times daily. 60 tablet 11    ondansetron (ZOFRAN) 8 MG tablet Take 8 mg by mouth every 6 (six) hours as needed for Nausea (and vomiting).      OXcarbazepine (TRILEPTAL) 300 MG Tab Take 150 mg by mouth nightly.      PHENobarbitaL (LUMINAL) 97.2 MG tablet Take 1 tablet (97.2 mg total) by mouth every evening. 30 tablet 5    PHENobarbitaL (LUMINAL) 97.2 MG tablet Take 1 tablet (97.2 mg total) by mouth every evening. 30 tablet 0    phenoL (CHLORASEPTIC) 0.5 % SprA by Mucous Membrane route 2 (two) times daily as needed (sore throat).      polyethylene glycol (GLYCOLAX) 17 gram PwPk Take 17 g by mouth once daily. 30 packet 5    pravastatin (PRAVACHOL) 20 MG tablet Take 20 mg by mouth once daily.      traMADoL (ULTRAM) 50 mg tablet Take 1 tablet (50 mg total) by mouth every 12 (twelve) hours as needed for Pain. 90 tablet 0    vitamin D (VITAMIN D3) 1000 units Tab Take 1,000 Units by mouth once daily.       No current facility-administered medications on file prior to visit.        Review of patient's allergies indicates:   Allergen Reactions    Adhesive Itching and Blisters    Penicillins Anaphylaxis    Tramadol Hives    Avelox [moxifloxacin] Rash     Facial and arm itching and redness. Pt states throat closes when given.    Amoxil [amoxicillin]     Aspridrox [aspirin, buffered]     Codeine Other (See Comments)     Throat swelling    Keflex [cephalexin]      Tolerated cefepime and cefazolin    Norvasc [amlodipine]     Red dye Hives    Robitussin  [guaifenesin]     Sulfa (sulfonamide antibiotics)     Tylenol [acetaminophen]      Has reaction to Tylenol with red dye and unable to take Extra Strength Tylenol/ CAN ONLY TOLERATE REG STRENGTH TYLENOL    Vicks vaporub [camphor-eucalyptus oil-menthol]        OBJECTIVE:     Vital Signs:  LMP  (LMP Unknown)   Wt Readings from Last 1 Encounters:   05/21/22 0551 72.3 kg (159 lb 6.3 oz)   05/21/22 0138 72.3 kg (159 lb 6.3 oz)     There is no height or weight on file to calculate BMI.        Physical Exam:  LMP  (LMP Unknown)   General appearance: alert, cooperative, no distress  Constitutional:Oriented to person, place, and time  + appears well-developed and well-nourished.   HEENT: Normocephalic, atraumatic, neck symmetrical, no nasal discharge   Eyes: conjunctivae/corneas clear, PERRL, EOM's intact  Lungs: clear to auscultation bilaterally, no dullness to percussion bilaterally  Heart: regular rate and rhythm without rub; no displacement of the PMI   Abdomen: soft, non-tender; bowel sounds normoactive; no organomegaly  Extremities: extremities symmetric; no clubbing, cyanosis, or edema  Integument: Skin color, texture, turgor normal; no rashes; hair distrubution normal  Neurologic: Alert and oriented X 3, normal strength, normal coordination and gait  Psychiatric: no pressured speech; normal affect; no evidence of impaired cognition     Laboratory  Lab Results   Component Value Date    WBC 10.89 05/23/2022    HGB 10.4 (L) 05/23/2022    HCT 31.6 (L) 05/23/2022    MCV 98 05/23/2022     05/23/2022     BMP  Lab Results   Component Value Date     (L) 05/23/2022    K 3.6 05/23/2022    CL 95 05/23/2022    CO2 26 05/23/2022    BUN 17 05/23/2022    CREATININE 0.6 05/23/2022    CALCIUM 8.4 (L) 05/23/2022    ANIONGAP 12 05/23/2022    ESTGFRAFRICA >60 05/23/2022    EGFRNONAA >60 05/23/2022     Lab Results   Component Value Date    ALT 24 05/23/2022    AST 21 05/23/2022    ALKPHOS 86 05/23/2022    BILITOT 0.3  05/23/2022     Lab Results   Component Value Date    INR 1.0 01/31/2020    INR 1.0 01/31/2020    INR 0.9 01/30/2020     No results found for: HGBA1C    Diagnostic Results:        TRANSITION OF CARE:         ASSESSMENT & PLAN:     HIGH RISK CONDITION(S):  Partial small bowel obstruction  Slow transit constipation  Colostomy in place  From NH with N/V given zofran in ED with resolution for now  Ostomy site noted without stool or flatulence  CTAbd/pelvis: Single nonspecific dilated loop of small bowel without transition point. Low-grade partial obstruction not excluded. Thickening of the wall of the distal esophagus with intraluminal contrast material concerning for reflux esophagitis.  Further evaluation with EGD is recommended to exclude a neoplastic process. Tree-in-bud nodules in the right middle lobe and the lingula with mild bronchiectasis in the lingula.  Findings are likely related to an atypical infectious process such as MAC.  -NPO  -symptomatic management  -IVF  -replace electrolytes from vomiting  -appreciate GI recs-hold NGT for continuous vomiting     UTI (urinary tract infection)  UA positive   Cefazolin        Leukocytosis  WBC 16  Lactate normal  No indications of infection noted  UA pending        Chronic anemia  Stable  monitor        Paroxysmal atrial fibrillation  Chronic anticoagulation  Takes Eliquis  Resume and monitor  Instructions for the patient:      Scheduled Follow-up :  No future appointments.    Post Visit Medication List:     Medication List          Accurate as of August 4, 2022 10:52 AM. If you have any questions, ask your nurse or doctor.            CONTINUE taking these medications    acetaminophen 325 MG tablet  Commonly known as: TYLENOL  Take 2 tablets (650 mg total) by mouth every 4 (four) hours as needed for Pain.     apixaban 2.5 mg Tab  Commonly known as: ELIQUIS  Take 1 tablet (2.5 mg total) by mouth 2 (two) times daily.     calcium-vitamin D 600 mg-10 mcg (400 unit) Tab      CHLORASEPTIC 0.5 % Spra  Generic drug: phenoL     levothyroxine 125 MCG tablet  Commonly known as: SYNTHROID  TAKE 1 TABLET (125 MCG TOTAL) BY MOUTH ONCE DAILY.     losartan 50 MG tablet  Commonly known as: COZAAR  Take 1 tablet (50 mg total) by mouth once daily.     magnesium oxide 400 mg (241.3 mg magnesium) tablet  Commonly known as: MAG-OX  Take 2 tablets (800 mg total) by mouth once daily.     metoprolol tartrate 25 MG tablet  Commonly known as: LOPRESSOR  Take 1 tablet (25 mg total) by mouth 2 (two) times daily.     ondansetron 8 MG tablet  Commonly known as: ZOFRAN     OXcarbazepine 300 MG Tab  Commonly known as: TRILEPTAL     * PHENobarbitaL 97.2 MG tablet  Commonly known as: LUMINAL  Take 1 tablet (97.2 mg total) by mouth every evening.     * PHENobarbitaL 97.2 MG tablet  Commonly known as: LUMINAL  Take 1 tablet (97.2 mg total) by mouth every evening.     polyethylene glycol 17 gram Pwpk  Commonly known as: GLYCOLAX  Take 17 g by mouth once daily.     pravastatin 20 MG tablet  Commonly known as: PRAVACHOL     traMADoL 50 mg tablet  Commonly known as: ULTRAM  Take 1 tablet (50 mg total) by mouth every 12 (twelve) hours as needed for Pain.     vitamin D 1000 units Tab  Commonly known as: VITAMIN D3         * This list has 2 medication(s) that are the same as other medications prescribed for you. Read the directions carefully, and ask your doctor or other care provider to review them with you.                Signing Physician:  Jese Booker MD

## 2022-08-12 RX ORDER — PHENOBARBITAL 97.2 MG/1
97.2 TABLET ORAL 2 TIMES DAILY
Qty: 90 TABLET | Refills: 0 | Status: ON HOLD | OUTPATIENT
Start: 2022-08-12 | End: 2022-09-06 | Stop reason: SDUPTHER

## 2022-08-29 ENCOUNTER — HOSPITAL ENCOUNTER (INPATIENT)
Facility: HOSPITAL | Age: 87
LOS: 7 days | Discharge: HOME HEALTH | DRG: 354 | End: 2022-09-06
Attending: EMERGENCY MEDICINE | Admitting: FAMILY MEDICINE
Payer: MEDICARE

## 2022-08-29 DIAGNOSIS — R00.0 TACHYARRHYTHMIA: ICD-10-CM

## 2022-08-29 DIAGNOSIS — R07.9 CHEST PAIN: ICD-10-CM

## 2022-08-29 DIAGNOSIS — R31.9 URINARY TRACT INFECTION WITH HEMATURIA, SITE UNSPECIFIED: ICD-10-CM

## 2022-08-29 DIAGNOSIS — K43.3 PARASTOMAL HERNIA WITH OBSTRUCTION AND WITHOUT GANGRENE: ICD-10-CM

## 2022-08-29 DIAGNOSIS — N39.0 URINARY TRACT INFECTION WITH HEMATURIA, SITE UNSPECIFIED: ICD-10-CM

## 2022-08-29 DIAGNOSIS — R11.10 VOMITING: ICD-10-CM

## 2022-08-29 DIAGNOSIS — K56.609 SMALL BOWEL OBSTRUCTION: Primary | ICD-10-CM

## 2022-08-29 LAB
ALBUMIN SERPL BCP-MCNC: 3.7 G/DL (ref 3.5–5.2)
ALP SERPL-CCNC: 99 U/L (ref 55–135)
ALT SERPL W/O P-5'-P-CCNC: 23 U/L (ref 10–44)
ANION GAP SERPL CALC-SCNC: 16 MMOL/L (ref 8–16)
AST SERPL-CCNC: 19 U/L (ref 10–40)
BACTERIA #/AREA URNS HPF: ABNORMAL /HPF
BASOPHILS # BLD AUTO: 0.01 K/UL (ref 0–0.2)
BASOPHILS NFR BLD: 0.1 % (ref 0–1.9)
BILIRUB SERPL-MCNC: 0.5 MG/DL (ref 0.1–1)
BILIRUB UR QL STRIP: NEGATIVE
BUN SERPL-MCNC: 18 MG/DL (ref 8–23)
CALCIUM SERPL-MCNC: 9.3 MG/DL (ref 8.7–10.5)
CHLORIDE SERPL-SCNC: 97 MMOL/L (ref 95–110)
CLARITY UR: ABNORMAL
CO2 SERPL-SCNC: 26 MMOL/L (ref 23–29)
COLOR UR: YELLOW
CREAT SERPL-MCNC: 0.8 MG/DL (ref 0.5–1.4)
CTP QC/QA: YES
DIFFERENTIAL METHOD: ABNORMAL
EOSINOPHIL # BLD AUTO: 0 K/UL (ref 0–0.5)
EOSINOPHIL NFR BLD: 0 % (ref 0–8)
ERYTHROCYTE [DISTWIDTH] IN BLOOD BY AUTOMATED COUNT: 13.6 % (ref 11.5–14.5)
EST. GFR  (NO RACE VARIABLE): >60 ML/MIN/1.73 M^2
GLUCOSE SERPL-MCNC: 140 MG/DL (ref 70–110)
GLUCOSE UR QL STRIP: NEGATIVE
HCT VFR BLD AUTO: 38.4 % (ref 37–48.5)
HGB BLD-MCNC: 12.5 G/DL (ref 12–16)
HGB UR QL STRIP: NEGATIVE
HYALINE CASTS #/AREA URNS LPF: 0 /LPF
IMM GRANULOCYTES # BLD AUTO: 0.03 K/UL (ref 0–0.04)
IMM GRANULOCYTES NFR BLD AUTO: 0.3 % (ref 0–0.5)
KETONES UR QL STRIP: ABNORMAL
LACTATE SERPL-SCNC: 1.8 MMOL/L (ref 0.5–2.2)
LEUKOCYTE ESTERASE UR QL STRIP: ABNORMAL
LIPASE SERPL-CCNC: 4 U/L (ref 4–60)
LYMPHOCYTES # BLD AUTO: 1.7 K/UL (ref 1–4.8)
LYMPHOCYTES NFR BLD: 16.2 % (ref 18–48)
MAGNESIUM SERPL-MCNC: 1.8 MG/DL (ref 1.6–2.6)
MCH RBC QN AUTO: 32.7 PG (ref 27–31)
MCHC RBC AUTO-ENTMCNC: 32.6 G/DL (ref 32–36)
MCV RBC AUTO: 101 FL (ref 82–98)
MICROSCOPIC COMMENT: ABNORMAL
MONOCYTES # BLD AUTO: 1.1 K/UL (ref 0.3–1)
MONOCYTES NFR BLD: 10 % (ref 4–15)
NEUTROPHILS # BLD AUTO: 7.9 K/UL (ref 1.8–7.7)
NEUTROPHILS NFR BLD: 73.4 % (ref 38–73)
NITRITE UR QL STRIP: NEGATIVE
NRBC BLD-RTO: 0 /100 WBC
PH UR STRIP: 8 [PH] (ref 5–8)
PLATELET # BLD AUTO: 182 K/UL (ref 150–450)
PMV BLD AUTO: 9.7 FL (ref 9.2–12.9)
POTASSIUM SERPL-SCNC: 3.7 MMOL/L (ref 3.5–5.1)
PROT SERPL-MCNC: 7 G/DL (ref 6–8.4)
PROT UR QL STRIP: ABNORMAL
RBC # BLD AUTO: 3.82 M/UL (ref 4–5.4)
RBC #/AREA URNS HPF: 1 /HPF (ref 0–4)
SARS-COV-2 RDRP RESP QL NAA+PROBE: NEGATIVE
SODIUM SERPL-SCNC: 139 MMOL/L (ref 136–145)
SP GR UR STRIP: 1.02 (ref 1–1.03)
TROPONIN I SERPL DL<=0.01 NG/ML-MCNC: 0.02 NG/ML (ref 0–0.03)
URN SPEC COLLECT METH UR: ABNORMAL
UROBILINOGEN UR STRIP-ACNC: ABNORMAL EU/DL
WBC # BLD AUTO: 10.71 K/UL (ref 3.9–12.7)
WBC #/AREA URNS HPF: 24 /HPF (ref 0–5)

## 2022-08-29 PROCEDURE — 93010 ELECTROCARDIOGRAM REPORT: CPT | Mod: ,,, | Performed by: INTERNAL MEDICINE

## 2022-08-29 PROCEDURE — 93010 EKG 12-LEAD: ICD-10-PCS | Mod: ,,, | Performed by: INTERNAL MEDICINE

## 2022-08-29 PROCEDURE — 87086 URINE CULTURE/COLONY COUNT: CPT | Performed by: EMERGENCY MEDICINE

## 2022-08-29 PROCEDURE — 96365 THER/PROPH/DIAG IV INF INIT: CPT

## 2022-08-29 PROCEDURE — 87077 CULTURE AEROBIC IDENTIFY: CPT | Performed by: EMERGENCY MEDICINE

## 2022-08-29 PROCEDURE — 25000003 PHARM REV CODE 250: Performed by: EMERGENCY MEDICINE

## 2022-08-29 PROCEDURE — S0073 INJECTION, AZTREONAM, 500 MG: HCPCS | Performed by: EMERGENCY MEDICINE

## 2022-08-29 PROCEDURE — 83690 ASSAY OF LIPASE: CPT | Performed by: EMERGENCY MEDICINE

## 2022-08-29 PROCEDURE — U0002 COVID-19 LAB TEST NON-CDC: HCPCS | Performed by: EMERGENCY MEDICINE

## 2022-08-29 PROCEDURE — 87186 SC STD MICRODIL/AGAR DIL: CPT | Performed by: EMERGENCY MEDICINE

## 2022-08-29 PROCEDURE — 83605 ASSAY OF LACTIC ACID: CPT | Performed by: EMERGENCY MEDICINE

## 2022-08-29 PROCEDURE — 96375 TX/PRO/DX INJ NEW DRUG ADDON: CPT

## 2022-08-29 PROCEDURE — 81000 URINALYSIS NONAUTO W/SCOPE: CPT | Performed by: EMERGENCY MEDICINE

## 2022-08-29 PROCEDURE — 85025 COMPLETE CBC W/AUTO DIFF WBC: CPT | Performed by: EMERGENCY MEDICINE

## 2022-08-29 PROCEDURE — 83735 ASSAY OF MAGNESIUM: CPT | Performed by: EMERGENCY MEDICINE

## 2022-08-29 PROCEDURE — 84484 ASSAY OF TROPONIN QUANT: CPT | Performed by: EMERGENCY MEDICINE

## 2022-08-29 PROCEDURE — 99285 EMERGENCY DEPT VISIT HI MDM: CPT | Mod: 25

## 2022-08-29 PROCEDURE — 63600175 PHARM REV CODE 636 W HCPCS: Performed by: EMERGENCY MEDICINE

## 2022-08-29 PROCEDURE — 87088 URINE BACTERIA CULTURE: CPT | Performed by: EMERGENCY MEDICINE

## 2022-08-29 PROCEDURE — 93005 ELECTROCARDIOGRAM TRACING: CPT

## 2022-08-29 PROCEDURE — 80053 COMPREHEN METABOLIC PANEL: CPT | Performed by: EMERGENCY MEDICINE

## 2022-08-29 PROCEDURE — 87040 BLOOD CULTURE FOR BACTERIA: CPT | Mod: 59 | Performed by: EMERGENCY MEDICINE

## 2022-08-29 RX ORDER — OXCARBAZEPINE 150 MG/1
150 TABLET, FILM COATED ORAL NIGHTLY
COMMUNITY
Start: 2022-08-26

## 2022-08-29 RX ORDER — MORPHINE SULFATE 2 MG/ML
2 INJECTION, SOLUTION INTRAMUSCULAR; INTRAVENOUS
Status: COMPLETED | OUTPATIENT
Start: 2022-08-29 | End: 2022-08-29

## 2022-08-29 RX ORDER — ONDANSETRON 2 MG/ML
4 INJECTION INTRAMUSCULAR; INTRAVENOUS
Status: COMPLETED | OUTPATIENT
Start: 2022-08-29 | End: 2022-08-29

## 2022-08-29 RX ORDER — SODIUM CHLORIDE 9 MG/ML
1000 INJECTION, SOLUTION INTRAVENOUS
Status: DISCONTINUED | OUTPATIENT
Start: 2022-08-29 | End: 2022-08-29

## 2022-08-29 RX ADMIN — MORPHINE SULFATE 2 MG: 2 INJECTION, SOLUTION INTRAMUSCULAR; INTRAVENOUS at 10:08

## 2022-08-29 RX ADMIN — AZTREONAM 2000 MG: 2 INJECTION, POWDER, LYOPHILIZED, FOR SOLUTION INTRAMUSCULAR; INTRAVENOUS at 08:08

## 2022-08-29 RX ADMIN — SODIUM CHLORIDE 1413 ML: 0.9 INJECTION, SOLUTION INTRAVENOUS at 08:08

## 2022-08-29 RX ADMIN — ONDANSETRON 4 MG: 2 INJECTION INTRAMUSCULAR; INTRAVENOUS at 07:08

## 2022-08-30 ENCOUNTER — PATIENT OUTREACH (OUTPATIENT)
Dept: ADMINISTRATIVE | Facility: OTHER | Age: 87
End: 2022-08-30
Payer: MEDICARE

## 2022-08-30 PROBLEM — K56.609 SMALL BOWEL OBSTRUCTION: Status: ACTIVE | Noted: 2022-08-30

## 2022-08-30 LAB
BASOPHILS # BLD AUTO: 0.02 K/UL (ref 0–0.2)
BASOPHILS NFR BLD: 0.2 % (ref 0–1.9)
DIFFERENTIAL METHOD: ABNORMAL
EOSINOPHIL # BLD AUTO: 0 K/UL (ref 0–0.5)
EOSINOPHIL NFR BLD: 0 % (ref 0–8)
ERYTHROCYTE [DISTWIDTH] IN BLOOD BY AUTOMATED COUNT: 13.6 % (ref 11.5–14.5)
HCT VFR BLD AUTO: 35.6 % (ref 37–48.5)
HGB BLD-MCNC: 11.9 G/DL (ref 12–16)
IMM GRANULOCYTES # BLD AUTO: 0.02 K/UL (ref 0–0.04)
IMM GRANULOCYTES NFR BLD AUTO: 0.2 % (ref 0–0.5)
LACTATE SERPL-SCNC: 1.2 MMOL/L (ref 0.5–2.2)
LYMPHOCYTES # BLD AUTO: 1.5 K/UL (ref 1–4.8)
LYMPHOCYTES NFR BLD: 15.1 % (ref 18–48)
MCH RBC QN AUTO: 33.3 PG (ref 27–31)
MCHC RBC AUTO-ENTMCNC: 33.4 G/DL (ref 32–36)
MCV RBC AUTO: 100 FL (ref 82–98)
MONOCYTES # BLD AUTO: 0.8 K/UL (ref 0.3–1)
MONOCYTES NFR BLD: 8.4 % (ref 4–15)
NEUTROPHILS # BLD AUTO: 7.3 K/UL (ref 1.8–7.7)
NEUTROPHILS NFR BLD: 76.1 % (ref 38–73)
NRBC BLD-RTO: 0 /100 WBC
PLATELET # BLD AUTO: 190 K/UL (ref 150–450)
PMV BLD AUTO: 9.7 FL (ref 9.2–12.9)
POCT GLUCOSE: 130 MG/DL (ref 70–110)
RBC # BLD AUTO: 3.57 M/UL (ref 4–5.4)
WBC # BLD AUTO: 9.63 K/UL (ref 3.9–12.7)

## 2022-08-30 PROCEDURE — 25000003 PHARM REV CODE 250: Performed by: HOSPITALIST

## 2022-08-30 PROCEDURE — A4216 STERILE WATER/SALINE, 10 ML: HCPCS | Performed by: NURSE PRACTITIONER

## 2022-08-30 PROCEDURE — 27000221 HC OXYGEN, UP TO 24 HOURS

## 2022-08-30 PROCEDURE — 83605 ASSAY OF LACTIC ACID: CPT | Performed by: EMERGENCY MEDICINE

## 2022-08-30 PROCEDURE — 99222 PR INITIAL HOSPITAL CARE,LEVL II: ICD-10-PCS | Mod: ,,, | Performed by: STUDENT IN AN ORGANIZED HEALTH CARE EDUCATION/TRAINING PROGRAM

## 2022-08-30 PROCEDURE — 25500020 PHARM REV CODE 255: Performed by: EMERGENCY MEDICINE

## 2022-08-30 PROCEDURE — 85025 COMPLETE CBC W/AUTO DIFF WBC: CPT | Performed by: NURSE PRACTITIONER

## 2022-08-30 PROCEDURE — 93010 EKG 12-LEAD: ICD-10-PCS | Mod: ,,, | Performed by: INTERNAL MEDICINE

## 2022-08-30 PROCEDURE — 63600175 PHARM REV CODE 636 W HCPCS: Performed by: NURSE PRACTITIONER

## 2022-08-30 PROCEDURE — 99222 1ST HOSP IP/OBS MODERATE 55: CPT | Mod: ,,, | Performed by: STUDENT IN AN ORGANIZED HEALTH CARE EDUCATION/TRAINING PROGRAM

## 2022-08-30 PROCEDURE — S0073 INJECTION, AZTREONAM, 500 MG: HCPCS | Performed by: EMERGENCY MEDICINE

## 2022-08-30 PROCEDURE — 93010 ELECTROCARDIOGRAM REPORT: CPT | Mod: ,,, | Performed by: INTERNAL MEDICINE

## 2022-08-30 PROCEDURE — 25000003 PHARM REV CODE 250: Performed by: NURSE PRACTITIONER

## 2022-08-30 PROCEDURE — 25000003 PHARM REV CODE 250: Performed by: EMERGENCY MEDICINE

## 2022-08-30 PROCEDURE — 36415 COLL VENOUS BLD VENIPUNCTURE: CPT | Performed by: NURSE PRACTITIONER

## 2022-08-30 PROCEDURE — 80183 DRUG SCRN QUANT OXCARBAZEPIN: CPT | Performed by: NURSE PRACTITIONER

## 2022-08-30 PROCEDURE — 96375 TX/PRO/DX INJ NEW DRUG ADDON: CPT

## 2022-08-30 PROCEDURE — 11000001 HC ACUTE MED/SURG PRIVATE ROOM

## 2022-08-30 PROCEDURE — 99900035 HC TECH TIME PER 15 MIN (STAT)

## 2022-08-30 PROCEDURE — 94761 N-INVAS EAR/PLS OXIMETRY MLT: CPT

## 2022-08-30 PROCEDURE — 93005 ELECTROCARDIOGRAM TRACING: CPT

## 2022-08-30 RX ORDER — IPRATROPIUM BROMIDE AND ALBUTEROL SULFATE 2.5; .5 MG/3ML; MG/3ML
3 SOLUTION RESPIRATORY (INHALATION) EVERY 4 HOURS PRN
Status: DISCONTINUED | OUTPATIENT
Start: 2022-08-30 | End: 2022-09-06 | Stop reason: HOSPADM

## 2022-08-30 RX ORDER — PHENOBARBITAL 32.4 MG/1
97.2 TABLET ORAL NIGHTLY
Status: DISCONTINUED | OUTPATIENT
Start: 2022-08-30 | End: 2022-09-06 | Stop reason: HOSPADM

## 2022-08-30 RX ORDER — SODIUM CHLORIDE 9 MG/ML
INJECTION, SOLUTION INTRAVENOUS CONTINUOUS
Status: DISCONTINUED | OUTPATIENT
Start: 2022-08-30 | End: 2022-09-04

## 2022-08-30 RX ORDER — HYDRALAZINE HYDROCHLORIDE 20 MG/ML
5 INJECTION INTRAMUSCULAR; INTRAVENOUS EVERY 6 HOURS PRN
Status: DISCONTINUED | OUTPATIENT
Start: 2022-08-30 | End: 2022-08-30

## 2022-08-30 RX ORDER — ENOXAPARIN SODIUM 100 MG/ML
40 INJECTION SUBCUTANEOUS EVERY 24 HOURS
Status: DISCONTINUED | OUTPATIENT
Start: 2022-08-30 | End: 2022-08-30

## 2022-08-30 RX ORDER — POLYETHYLENE GLYCOL 3350 17 G/17G
17 POWDER, FOR SOLUTION ORAL DAILY PRN
Status: DISCONTINUED | OUTPATIENT
Start: 2022-08-30 | End: 2022-09-03

## 2022-08-30 RX ORDER — PROCHLORPERAZINE EDISYLATE 5 MG/ML
5 INJECTION INTRAMUSCULAR; INTRAVENOUS EVERY 6 HOURS PRN
Status: DISCONTINUED | OUTPATIENT
Start: 2022-08-30 | End: 2022-09-06 | Stop reason: HOSPADM

## 2022-08-30 RX ORDER — PHENOBARBITAL 32.4 MG/1
97.2 TABLET ORAL NIGHTLY
Status: DISCONTINUED | OUTPATIENT
Start: 2022-08-30 | End: 2022-08-30

## 2022-08-30 RX ORDER — LOSARTAN POTASSIUM 50 MG/1
50 TABLET ORAL DAILY
Status: DISCONTINUED | OUTPATIENT
Start: 2022-08-30 | End: 2022-09-05

## 2022-08-30 RX ORDER — PRAVASTATIN SODIUM 10 MG/1
20 TABLET ORAL DAILY
Status: DISCONTINUED | OUTPATIENT
Start: 2022-08-30 | End: 2022-09-06 | Stop reason: HOSPADM

## 2022-08-30 RX ORDER — SODIUM CHLORIDE 0.9 % (FLUSH) 0.9 %
10 SYRINGE (ML) INJECTION EVERY 8 HOURS
Status: DISCONTINUED | OUTPATIENT
Start: 2022-08-30 | End: 2022-09-06 | Stop reason: HOSPADM

## 2022-08-30 RX ORDER — TALC
6 POWDER (GRAM) TOPICAL NIGHTLY PRN
Status: DISCONTINUED | OUTPATIENT
Start: 2022-08-30 | End: 2022-09-06 | Stop reason: HOSPADM

## 2022-08-30 RX ORDER — METOPROLOL TARTRATE 25 MG/1
25 TABLET, FILM COATED ORAL 2 TIMES DAILY
Status: DISCONTINUED | OUTPATIENT
Start: 2022-08-30 | End: 2022-09-04

## 2022-08-30 RX ORDER — METOPROLOL TARTRATE 1 MG/ML
5 INJECTION, SOLUTION INTRAVENOUS ONCE
Status: COMPLETED | OUTPATIENT
Start: 2022-08-30 | End: 2022-08-30

## 2022-08-30 RX ORDER — IBUPROFEN 200 MG
16 TABLET ORAL
Status: DISCONTINUED | OUTPATIENT
Start: 2022-08-30 | End: 2022-09-06 | Stop reason: HOSPADM

## 2022-08-30 RX ORDER — IBUPROFEN 200 MG
24 TABLET ORAL
Status: DISCONTINUED | OUTPATIENT
Start: 2022-08-30 | End: 2022-09-06 | Stop reason: HOSPADM

## 2022-08-30 RX ORDER — LANOLIN ALCOHOL/MO/W.PET/CERES
800 CREAM (GRAM) TOPICAL DAILY
Status: DISCONTINUED | OUTPATIENT
Start: 2022-08-30 | End: 2022-09-06 | Stop reason: HOSPADM

## 2022-08-30 RX ORDER — OXCARBAZEPINE 150 MG/1
150 TABLET, FILM COATED ORAL NIGHTLY
Status: DISCONTINUED | OUTPATIENT
Start: 2022-08-30 | End: 2022-09-06 | Stop reason: HOSPADM

## 2022-08-30 RX ORDER — HYDRALAZINE HYDROCHLORIDE 20 MG/ML
10 INJECTION INTRAMUSCULAR; INTRAVENOUS EVERY 6 HOURS PRN
Status: DISCONTINUED | OUTPATIENT
Start: 2022-08-30 | End: 2022-09-06 | Stop reason: HOSPADM

## 2022-08-30 RX ORDER — NALOXONE HCL 0.4 MG/ML
0.02 VIAL (ML) INJECTION
Status: DISCONTINUED | OUTPATIENT
Start: 2022-08-30 | End: 2022-09-06 | Stop reason: HOSPADM

## 2022-08-30 RX ORDER — LORAZEPAM 2 MG/ML
2 INJECTION INTRAMUSCULAR
Status: DISCONTINUED | OUTPATIENT
Start: 2022-08-30 | End: 2022-09-06 | Stop reason: HOSPADM

## 2022-08-30 RX ORDER — MORPHINE SULFATE 2 MG/ML
2 INJECTION, SOLUTION INTRAMUSCULAR; INTRAVENOUS ONCE
Status: DISCONTINUED | OUTPATIENT
Start: 2022-08-30 | End: 2022-09-06 | Stop reason: HOSPADM

## 2022-08-30 RX ORDER — SODIUM CHLORIDE 9 MG/ML
INJECTION, SOLUTION INTRAVENOUS CONTINUOUS
Status: DISCONTINUED | OUTPATIENT
Start: 2022-08-30 | End: 2022-08-30

## 2022-08-30 RX ORDER — METOPROLOL TARTRATE 25 MG/1
25 TABLET, FILM COATED ORAL 2 TIMES DAILY
Status: DISCONTINUED | OUTPATIENT
Start: 2022-08-30 | End: 2022-08-30

## 2022-08-30 RX ORDER — SODIUM CHLORIDE 9 MG/ML
INJECTION, SOLUTION INTRAVENOUS CONTINUOUS
Status: CANCELLED | OUTPATIENT
Start: 2022-08-30 | End: 2022-08-30

## 2022-08-30 RX ORDER — ONDANSETRON 2 MG/ML
4 INJECTION INTRAMUSCULAR; INTRAVENOUS EVERY 8 HOURS PRN
Status: DISCONTINUED | OUTPATIENT
Start: 2022-08-30 | End: 2022-09-06 | Stop reason: HOSPADM

## 2022-08-30 RX ORDER — GLUCAGON 1 MG
1 KIT INJECTION
Status: DISCONTINUED | OUTPATIENT
Start: 2022-08-30 | End: 2022-09-06 | Stop reason: HOSPADM

## 2022-08-30 RX ADMIN — PRAVASTATIN SODIUM 20 MG: 10 TABLET ORAL at 09:08

## 2022-08-30 RX ADMIN — SODIUM CHLORIDE: 0.9 INJECTION, SOLUTION INTRAVENOUS at 05:08

## 2022-08-30 RX ADMIN — AZTREONAM 2000 MG: 2 INJECTION, POWDER, LYOPHILIZED, FOR SOLUTION INTRAMUSCULAR; INTRAVENOUS at 05:08

## 2022-08-30 RX ADMIN — LOSARTAN POTASSIUM 50 MG: 50 TABLET, FILM COATED ORAL at 08:08

## 2022-08-30 RX ADMIN — Medication 800 MG: at 09:08

## 2022-08-30 RX ADMIN — PHENOBARBITAL 97.2 MG: 32.4 TABLET ORAL at 09:08

## 2022-08-30 RX ADMIN — Medication 10 ML: at 05:08

## 2022-08-30 RX ADMIN — HYDRALAZINE HYDROCHLORIDE 5 MG: 20 INJECTION INTRAMUSCULAR; INTRAVENOUS at 01:08

## 2022-08-30 RX ADMIN — METOROPROLOL TARTRATE 5 MG: 5 INJECTION, SOLUTION INTRAVENOUS at 06:08

## 2022-08-30 RX ADMIN — METOPROLOL TARTRATE 25 MG: 25 TABLET, FILM COATED ORAL at 07:08

## 2022-08-30 RX ADMIN — IOHEXOL 100 ML: 350 INJECTION, SOLUTION INTRAVENOUS at 12:08

## 2022-08-30 RX ADMIN — METOPROLOL TARTRATE 25 MG: 25 TABLET, FILM COATED ORAL at 09:08

## 2022-08-30 RX ADMIN — Medication 10 ML: at 01:08

## 2022-08-30 RX ADMIN — Medication 10 ML: at 10:08

## 2022-08-30 RX ADMIN — OXCARBAZEPINE 150 MG: 150 TABLET, FILM COATED ORAL at 09:08

## 2022-08-30 RX ADMIN — AZTREONAM 2000 MG: 2 INJECTION, POWDER, LYOPHILIZED, FOR SOLUTION INTRAMUSCULAR; INTRAVENOUS at 01:08

## 2022-08-30 RX ADMIN — SODIUM CHLORIDE: 0.9 INJECTION, SOLUTION INTRAVENOUS at 04:08

## 2022-08-30 NOTE — ASSESSMENT & PLAN NOTE
-Noted  -medium amount formed brown stool noted  -site intact with irritation around--consult wound care

## 2022-08-30 NOTE — NURSING
08/30/22 0547   Patient Request   Patient Requested telemetry -[id number]   Nurse Notification   Bedside Nurse Notified? Yes   Name of Bedside Nurse ZEINAB Wyatt   Nurse Notfication Method Telephone   Nurse Notified Of Other   Provider Notification   Provider Notified? Yes   Name of Provider MICHELL Dodd   Provider Notification Method Secure Chat   Provider Notified Of Other (See Comment)

## 2022-08-30 NOTE — ED PROVIDER NOTES
Encounter Date: 8/29/2022       History     Chief Complaint   Patient presents with    Emesis     Arrives via ems from [facility]. Per long-term, pt has had several episodes of emesis today. Had xray of abdomen done and showed bowel obstruction. Date of xray unknown. Pt complains of lest lower abd pain, ostomy noted. Denies CP or SOB at this time.      86-year-old female who arrives from nursing home chief complaint of multiple episodes of vomiting today.  The patient denies any fever/chills, states that she was given a shot for nausea prior to arrival.  She denies any chest pain/pressure/tightness.  Denies any shortness of breath or cough.  She does complain of generalized abdominal achiness that she describes as moderate in constant with exacerbation with palpation around her ostomy site.  She denies any dysuria/hematuria/urinary frequency.    Review of patient's allergies indicates:   Allergen Reactions    Adhesive Itching and Blisters    Penicillins Anaphylaxis    Tramadol Hives    Avelox [moxifloxacin] Rash     Facial and arm itching and redness. Pt states throat closes when given.    Amoxil [amoxicillin]     Aspridrox [aspirin, buffered]     Codeine Other (See Comments)     Throat swelling    Keflex [cephalexin]      Tolerated cefepime and cefazolin    Norvasc [amlodipine]     Red dye Hives    Robitussin [guaifenesin]     Sulfa (sulfonamide antibiotics)     Tylenol [acetaminophen]      Has reaction to Tylenol with red dye and unable to take Extra Strength Tylenol/ CAN ONLY TOLERATE REG STRENGTH TYLENOL    Vicks vaporub [camphor-eucalyptus oil-menthol]      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    In the  setting of septic shock and near death    Petit mal epilepsy 1954    Scoliosis of lumbar spine     Seizures     Unspecified hypothyroidism     UTI (urinary tract infection) 05/22/2022     Past Surgical History:   Procedure Laterality Date    APPENDECTOMY      BACK SURGERY  1988    vertebral fracture    BACK SURGERY  02/2013    lumbar L2-5    CATARACT EXTRACTION, BILATERAL Bilateral     CHOLECYSTECTOMY      open    EYE SURGERY Bilateral     cataract removal with lens implant    HYSTERECTOMY      PERCUTANEOUS TRANSLUMINAL ANGIOPLASTY (PTA) OF PERIPHERAL VESSEL N/A 2/3/2020    Procedure: PTA, PERIPHERAL VESSEL;  Surgeon: Timmy Simmons MD;  Location: [facility] CATH LAB/EP;  Service: Cardiology;  Laterality: N/A;    PORTACATH PLACEMENT Right 09/2016    RENAL ARTERY STENT Left 07/19/2017    SIGMOIDECTOMY  10/29/2019    SMALL INTESTINE SURGERY  08/23/2016    THROMBECTOMY N/A 2/3/2020    Procedure: THROMBECTOMY;  Surgeon: Timmy Simmons MD;  Location: [facility] CATH LAB/EP;  Service: Cardiology;  Laterality: N/A;    TONSILLECTOMY      VAGINAL HYSTERECTOMY W/ ANTERIOR AND POSTERIOR VAGINAL REPAIR       Family History   Problem Relation Age of Onset    Pancreatic cancer Mother     Hypertension Father     Heart attack Father      Social History     Tobacco Use    Smoking status: Never    Smokeless tobacco: Never   Substance Use Topics    Alcohol use: No    Drug use: No     Review of Systems   Gastrointestinal:  Positive for nausea and vomiting.   All other systems reviewed and are negative.    Physical Exam     Initial Vitals [08/29/22 1836]   BP Pulse Resp Temp SpO2   (!) 177/85 101 19 99.4 °F (37.4 °C) 96 %      MAP       --         Physical Exam    Nursing note and vitals reviewed.  Constitutional: She appears well-developed and well-nourished.   HENT:   Head: Normocephalic and atraumatic.   Cardiovascular:  Regular rhythm.           Tachycardic rate   Pulmonary/Chest: Breath sounds normal. She has no wheezes.   Abdominal:    Abdomen is soft, there is mild distention noted, patient appears to have most of her tenderness to palpation to the area around her ostomy site.  There is stool noted in the ostomy bag that appears brown with no evidence of hematochezia or melena.   Musculoskeletal:         General: Normal range of motion.     Neurological: She is alert and oriented to person, place, and time.   Skin: Skin is warm and dry. Capillary refill takes less than 2 seconds.   Psychiatric: She has a normal mood and affect.       ED Course   Procedures  Labs Reviewed   CBC W/ AUTO DIFFERENTIAL - Abnormal; Notable for the following components:       Result Value    RBC 3.82 (*)      (*)     MCH 32.7 (*)     Gran # (ANC) 7.9 (*)     Mono # 1.1 (*)     Gran % 73.4 (*)     Lymph % 16.2 (*)     All other components within normal limits   URINALYSIS, REFLEX TO URINE CULTURE - Abnormal; Notable for the following components:    Appearance, UA Hazy (*)     Protein, UA 2+ (*)     Ketones, UA 1+ (*)     Urobilinogen, UA 2.0-3.0 (*)     Leukocytes, UA 1+ (*)     All other components within normal limits    Narrative:     Specimen Source->Urine   URINALYSIS MICROSCOPIC - Abnormal; Notable for the following components:    WBC, UA 24 (*)     All other components within normal limits    Narrative:     Specimen Source->Urine   COMPREHENSIVE METABOLIC PANEL - Abnormal; Notable for the following components:    Glucose 140 (*)     All other components within normal limits   CULTURE, URINE   LACTIC ACID, PLASMA   TROPONIN I   MAGNESIUM   LIPASE   SARS-COV-2 RDRP GENE     EKG Readings: (Independently Interpreted)   Sinus rhythm rate of 107, left axis deviation, normal intervals, nonspecific ST T-wave abnormalities interpreted by me   ECG Results              EKG 12-lead (In process)  Result time 08/30/22 10:22:26      In process by Interface, Lab In [facility] (08/30/22 10:22:26)                   Narrative:    Test Reason : R11.10,    Vent. Rate : 107 BPM      Atrial Rate : 107 BPM     P-R Int : 128 ms          QRS Dur : 078 ms      QT Int : 346 ms       P-R-T Axes : 039 014 113 degrees     QTc Int : 461 ms    Sinus tachycardia  Nonspecific ST and T wave abnormality  Abnormal ECG  When compared with ECG of 21-MAY-2022 13:48,  Sinus rhythm has replaced Atrial fibrillation  Nonspecific T wave abnormality now evident in Inferior leads    Referred By: [email or web address]   SELF           Confirmed By:                                   Imaging Results               CT Abdomen Pelvis With Contrast (Final result)  Result time 08/30/22 00:27:39      Final result by Richard Romero DO (08/30/22 00:27:39)                   Impression:      1. High-grade small bowel obstruction with a transition point within a left lower quadrant parastomal hernia.  No evidence of bowel wall thickening, pneumatosis or gross perforation.  2. Distal esophageal wall thickening and luminal fluid which may be related to reflux esophagitis.  Rectum further evaluation with EGD to exclude and underlying lesion if not already performed.  3. Tree-in-bud nodularity in the right middle lobe with mild bronchiectasis in the middle lobe and lingula.  Findings are concerning for an atypical infectious process such as MAC.  Mild aspiration not excluded.  This report was flagged in Epic as abnormal.      Electronically signed by: Richard Romero  Date:    08/30/2022  Time:    00:27               Narrative:    EXAMINATION:  CT ABDOMEN PELVIS WITH CONTRAST    CLINICAL HISTORY:  gen abd pain with nausea and vomiting;    TECHNIQUE:  Axial CT images with sagittal and coronal reformats were obtained of the abdomen and pelvis from the hemidiaphragms through the symphysis pubis after the administration of 100mL Omnipaque 350.    COMPARISON:  CT of the abdomen and pelvis from 05/20/2022.    FINDINGS:  Lung Bases: There is tree-in-bud nodularity within the right middle lobe concerning for an atypical infectious process.  There is mild  bronchiectasis in the right middle lobe and the lingula.    Heart: Heart size is normal.  No pericardial effusion.    Distal esophagus: There is thickening of the visualized distal esophagus.  There is a small hiatal hernia containing fluid.    Liver: The liver is normal in size and demonstrates homogeneous enhancement without focal lesion.  The portal vasculature is patent.    Biliary tract: There is distension of the extrahepatic bile ducts which are likely due to history of cholecystectomy, stable.    Gallbladder: Surgically absent.    Pancreas: Normal. No pancreatic ductal dilatation.    Spleen: Normal size without focal lesion.    Adrenals: There is nonspecific thickening of the left adrenal gland.  The right adrenal gland is unremarkable.    Kidneys and urinary collecting systems: Normal.  No hydronephrosis or urolithiasis.    Lymph nodes: None enlarged.    Stomach and bowel: The stomach is normal.  There is a left lower quadrant colostomy with a parastomal hernia containing a loop of small bowel.  There is resultant high-grade small bowel obstruction with transition point in the left lower quadrant parastomal hernia (series 2, image 105).  Small bowel loops measure up to approximately 4.3 cm in diameter.  Distal to the transition point there is an anastomosis and a distended loop of bowel.  Distal loops of small are relatively decompressed.  There is a Sohail's pouch which appears unremarkable.    Peritoneum and mesentery: No ascites or free intraperitoneal air.  No abdominal fluid collection.    Vasculature: There is a left iliac vein stent.  There is severe calcified atherosclerosis of the aorta and branch arteries.    Urinary bladder: No wall thickening.    Reproductive organs: The uterus is surgically absent.    Body wall: Left lower quadrant parastomal hernia as previously discussed.  There is a midline remote anterior wall surgical incision.    Musculoskeletal: No aggressive osseous lesion.  There are  remote compression fractures of the T11, L2, and L4 vertebral bodies with unchanged height loss from prior.  There are multilevel degenerative changes of the lumbar spine with postop changes of L4 laminectomy.                                       X-Ray Chest AP Portable (Final result)  Result time 08/29/22 20:35:10      Final result by Richard Romero DO (08/29/22 20:35:10)                   Impression:      No acute cardiopulmonary abnormality.      Electronically signed by: Richard Romero  Date:    08/29/2022  Time:    20:35               Narrative:    EXAMINATION:  XR CHEST AP PORTABLE    CLINICAL HISTORY:  Sepsis;    TECHNIQUE:  Single frontal view of the chest was performed.    COMPARISON:  09/13/2020.    FINDINGS:  The lungs are well expanded and clear. No focal opacities are seen. The pleural spaces are clear.    The cardiac silhouette is unremarkable.  There are atherosclerotic calcifications of the aortic arch.    The visualized osseous structures demonstrate degenerative changes.                                       Medications   sodium chloride 0.9% flush 10 mL (10 mLs Intravenous Given 8/30/22 1321)   albuterol-ipratropium 2.5 mg-0.5 mg/3 mL nebulizer solution 3 mL (has no administration in time range)   melatonin tablet 6 mg (has no administration in time range)   ondansetron injection 4 mg (has no administration in time range)   prochlorperazine injection Soln 5 mg (has no administration in time range)   polyethylene glycol packet 17 g (has no administration in time range)   naloxone 0.4 mg/mL injection 0.02 mg (has no administration in time range)   glucose chewable tablet 16 g (has no administration in time range)   glucose chewable tablet 24 g (has no administration in time range)   glucagon (human recombinant) injection 1 mg (has no administration in time range)   dextrose 10% bolus 125 mL (has no administration in time range)   dextrose 10% bolus 250 mL (has no administration in time range)    hydrALAZINE injection 10 mg (has no administration in time range)   morphine injection 2 mg (2 mg Intravenous Not Given 8/30/22 0400)   levothyroxine tablet 125 mcg (125 mcg Oral Not Given 8/30/22 0600)   losartan tablet 50 mg (50 mg Oral Given 8/30/22 0859)   magnesium oxide tablet 800 mg (800 mg Oral Given 8/30/22 0900)   OXcarbazepine tablet 150 mg (150 mg Oral Not Given 8/30/22 0400)   PHENobarbitaL tablet 97.2 mg (has no administration in time range)   pravastatin tablet 20 mg (20 mg Oral Given 8/30/22 0900)   LORazepam injection 2 mg (has no administration in time range)   metoprolol tartrate (LOPRESSOR) tablet 25 mg (25 mg Oral Given 8/30/22 0711)   0.9%  NaCl infusion (has no administration in time range)   ondansetron injection 4 mg (4 mg Intravenous Given 8/29/22 1955)   sodium chloride 0.9% bolus 1,413 mL (0 mLs Intravenous Stopped 8/29/22 2107)   aztreonam 2 g in dextrose 5 % 100 mL IVPB (ready to mix system) (0 mg Intravenous Stopped 8/30/22 1349)   morphine injection 2 mg (2 mg Intravenous Given 8/29/22 2208)   iohexoL (OMNIPAQUE 350) injection 100 mL (100 mLs Intravenous Given 8/30/22 0008)   metoprolol injection 5 mg (5 mg Intravenous Given 8/30/22 0615)     Medical Decision Making:   ED Management:  Spoke to Hospital Medicine who will admit pt to their service.             ED Course as of 08/30/22 1634   Mon Aug 29, 2022   1921 Pt tachypneic and hypoxic on re-eval, she now meets SIRS criteria will initiate sepsis workup and erin antibiotics to urine at this time given the condition of the pt's urine.  Awaiting CXR. [CD]   2305 Checked on pt, she is not breathing 35 times/minute.  She has no complainants of sob, no increased wob and appears in no distress.  Awaiting CT abd/pelvis [CD]      ED Course User Index  [CD] Keith Man DO             Clinical Impression:   Final diagnoses:  [R11.10] Vomiting  [K56.609] Small bowel obstruction (Primary)  [N39.0, R31.9] Urinary tract infection  with hematuria, site unspecified        ED Disposition Condition    Admit                 Keith Man DO  08/30/22 6665

## 2022-08-30 NOTE — ED NOTES
Care assumed from day staff, presents from facility with emesis.    Airway patent,  Breathing spontaneously, maintaining oxygen saturations above 95% on NC at 2L/min, prior to oxygen admin desaturating to 89-90%  Monitored in sinus rhythm, hypertensive  Alert and orientated  Afebrile, nil reports of pain or discomfort.    IV inserted into left hand, unable to draw more than enough for one set of cultures, lab informed, will send a phlebotomist to take rest of pathology.  Medications as charted.    RS RN

## 2022-08-30 NOTE — CONSULTS
Wound care consult received for ostomy and R arm skin tear.  Ostomy appliance intact over stoma with parastomal hernia.  Surgery on case discussing possible surgical intervention.   R arm skin tear healed.  Will follow pt for ostomy care as needed.

## 2022-08-30 NOTE — ED NOTES
2 x attempts at getting NG inserted, second attempt was in but patient unable to tolerate tube, declining furtehr attempts at TOR.    RS RN

## 2022-08-30 NOTE — HPI
Ms. 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 She has had a parastomal hernia of the end colostomy with history of partial SBO. For the past couple of days she has had nausea and vomiting for which she presented to the ED. She has not had significant output from her colostomy for 4 days now. She also endorses abdominal pain, worse in the RLQ. No pain endorsed with the parastomal hernia. No fever, chills, chest pain, or shortness of breath.     Workup in  ED revealed a CT scan with high-grade SBO obstruction with transition point within the parastomal hernia. Lab work relatively benign. She takes Eliquis at home, which has been held since admission.

## 2022-08-30 NOTE — PROGRESS NOTES
IP Liaison - Initial Visit Note    Patient: Annette Garcia  MRN:  [id number]  Date of Service:  8/30/2022  Completed by:  PEPITO David    Reason for Visit   Patient presents with    IP Liaison Initial Visit       RSW met with patient at bedside in order to complete SDOH questionnaire and liaison assessment. Pt is currently a resident at [facility], per pt NH is taking care of all social needs. Per pt, pt is not in need of resources at this time.     The following were addressed during this visit:  - Review SDOH Questions   - Complete patient assessment   - Complete initial visit with patient        Patient Summary     IP Liaison Patient Assessment    General  Level of Caregiver support: Caregiver currently provides assistance  Have you had to make a decision between paying for any of the following in the last 2 months?: None  Transportation means: Other  Employment status: Retired and not working  Assessments  Was the PHQ Depression Screening completed this visit?: No  Was the BRITNEY-7 Screening completed this visit?: No      PEPITO David

## 2022-08-30 NOTE — ASSESSMENT & PLAN NOTE
Patient with Paroxysmal (<7 days) atrial fibrillation which is controlled currently with Beta Blocker. Patient is currently in sinus rhythm.[id number] Score: 4. HASBLED Score: Unable to calculate. Anticoagulation indicated. Anticoagulation done with home eliquis.  -awake general surgery rec before restarting home eliquis

## 2022-08-30 NOTE — NURSING
Pt arrived to unit via stretcher with Escort service, NAD.  L Hand 222 gauge SL and L Shoulder 20 gauge SL.  LQ colostomy remains in place with small amount of formed brown stool.  Rash/redness noted around colostomy.  Bandaid noted to RUE.  Pt oriented to room, bed in lowest position, rails up x 3, bed alarm placed and call light within reach.  Will continue to monitor patient.

## 2022-08-30 NOTE — NURSING
Care plan reviewed. Cardiac monitoring in place. Colostomy bag changed this morning noting scant amount of hard consistency stool. Pt is to stay NPO and to continue IVF NS at 100ml/hr. Pt had NG tube placed to left nare this am, tolerated well, placement verified by Xray, order for Intermittent wall suction in place, initiated. Family at the bedside. Safety maintained.

## 2022-08-30 NOTE — ASSESSMENT & PLAN NOTE
-c/o N/V and abdominal pain  -CT abdomen with High-grade small bowel obstruction with a transition point within a left lower quadrant parastomal hernia. No evidence of bowel wall thickening, pneumatosis or gross perforation.  -mild distention and tender to palpation around colostomy noted. Colostomy with brown stool in pouch  -NPO  -NGT to LIWS  -IVF  -consult general surgery

## 2022-08-30 NOTE — NURSING
[phone]  Beatris daughter called and would like an update on her care when there is an update. Patient aware of daughter receiving information and has verified that okay for her.

## 2022-08-30 NOTE — PHARMACY MED REC
"  [facility]           Pharmacy  Admission Medication History     The home medication history was taken by Marcia Tabares.      Medication history obtained from Medications listed below were obtained from: Nursing home    Based on information gathered for medication list, you may go to "Admission" then "Reconcile Home Medications" tabs to review and/or act upon those items.     The home medication list has been updated by the Pharmacy department.   Please read ALL comments highlighted in yellow.   Please address this information as you see fit.    Feel free to contact us if you have any questions or require assistance.    The medications listed below were removed from the home medication list.  Please reorder if appropriate:    Patient reports NOT TAKING the following medication(s):  Vitamin D3 1000u  Tramadol 50mg    No current facility-administered medications on file prior to encounter.     Current Outpatient Medications on File Prior to Encounter   Medication Sig Dispense Refill    acetaminophen (TYLENOL) 325 MG tablet Take 2 tablets (650 mg total) by mouth every 4 (four) hours as needed for Pain.  0    apixaban (ELIQUIS) 2.5 mg Tab Take 1 tablet (2.5 mg total) by mouth 2 (two) times daily.      calcium-vitamin D 600 mg(1,500mg) -400 unit Tab Take 1 tablet by mouth once daily.      levothyroxine (SYNTHROID) 125 MCG tablet TAKE 1 TABLET (125 MCG TOTAL) BY MOUTH ONCE DAILY. 90 tablet 3    losartan (COZAAR) 50 MG tablet Take 1 tablet (50 mg total) by mouth once daily. 90 tablet 3    magnesium oxide (MAG-OX) 400 mg (241.3 mg magnesium) tablet Take 2 tablets (800 mg total) by mouth once daily.  0    metoprolol tartrate (LOPRESSOR) 25 MG tablet Take 1 tablet (25 mg total) by mouth 2 (two) times daily. 60 tablet 11    ondansetron (ZOFRAN) 8 MG tablet Take 8 mg by mouth every 6 (six) hours as needed for Nausea (and vomiting).      OXcarbazepine (TRILEPTAL) 150 MG Tab Take 150 mg by mouth nightly.   "    PHENobarbitaL (LUMINAL) 97.2 MG tablet Take 1 tablet (97.2 mg total) by mouth 2 (two) times daily. (Patient taking differently: Take 97.2 mg by mouth nightly.) 90 tablet 0    phenoL (CHLORASEPTIC) 0.5 % SprA by Mucous Membrane route 2 (two) times daily as needed (sore throat).      polyethylene glycol (GLYCOLAX) 17 gram PwPk Take 17 g by mouth once daily. (Patient taking differently: Take 17 g by mouth 2 (two) times daily as needed (constipation). Mix with 8 ounces of liquid) 30 packet 5    pravastatin (PRAVACHOL) 20 MG tablet Take 20 mg by mouth once daily.      [DISCONTINUED] OXcarbazepine (TRILEPTAL) 300 MG Tab Take 150 mg by mouth nightly.      [DISCONTINUED] traMADoL (ULTRAM) 50 mg tablet Take 1 tablet (50 mg total) by mouth every 12 (twelve) hours as needed for Pain. 90 tablet 0    [DISCONTINUED] vitamin D (VITAMIN D3) 1000 units Tab Take 1,000 Units by mouth once daily.         Please address this information as you see fit.  Feel free to contact us if you have any questions or require assistance.    Marcia Tabares  [phone]                .

## 2022-08-30 NOTE — PLAN OF CARE
SW was provided permission from pt to contact her daughter, to discuss dc planning. Beatris Richey (Daughter) [phone]       Pts daughter completed assessment. Pts daughter confirmed information on chart. Pt resides at [facility]. Pt will return to [facility] upon dc. Pt receives assistance with all ADLs from NH staff. Pts daughter reports that mostly is transported in her wheelchair throughout the day. Per pts daughter pt is mostly in her bed or wheelchair. Pts daughter reports she does not partake in much actively. SW provided contact information to pts daughter for any questions or concerns. SW will continue to follow pt throughout her transitions of care and assist with any dc needs.   SW will setup transport to NH upon dc.     SW sent updated clinicals to [facility] via [employer] Newsletter.     [employer] Alert: YES response from [facility] re: Referral [id number] for patient in [facility] [id number] A: Yes, willing to accept patient      08/30/22 0935   Discharge Assessment   Assessment Type Discharge Planning Assessment   Confirmed/corrected address, phone number and insurance Yes   Confirmed Demographics Correct on Facesheet   Source of Information family   Communicated JESSICA with patient/caregiver Yes   Reason For Admission Small bowel obstruction   Lives With facility resident   Do you expect to return to your current living situation? Yes   Do you have help at home or someone to help you manage your care at home? Yes   Who are your caregiver(s) and their phone number(s)? Patient resides at [facility]   Prior to hospitilization cognitive status: Alert/Oriented   Current cognitive status: Alert/Oriented   Walking or Climbing Stairs Difficulty ambulation difficulty, assistance 1 person   Dressing/Bathing Difficulty bathing difficulty, assistance 1 person   Home Layout Able to live on 1st floor   Readmission within 30 days? No   Patient currently being followed by outpatient case management?  No   Do you currently have service(s) that help you manage your care at home? No   Do you have prescription coverage? Yes   Coverage Medicare   Do you have any problems affording any of your prescribed medications? No   Is the patient taking medications as prescribed? yes   Who is going to help you get home at discharge? SW will setup transport   How do you get to doctors appointments? agency   Are you on dialysis? No   Do you take coumadin? No   Discharge Plan A Return to nursing home   DME Needed Upon Discharge  none   Discharge Plan discussed with: Adult children   Discharge Barriers Identified None   Physical Activity   On average, how many days per week do you engage in moderate to strenuous exercise (like a brisk walk)? 0 days   On average, how many minutes do you engage in exercise at this level? 0 min   Financial Resource Strain   How hard is it for you to pay for the very basics like food, housing, medical care, and heating? Not hard   Housing Stability   In the last 12 months, was there a time when you were not able to pay the mortgage or rent on time? N   In the last 12 months, was there a time when you did not have a steady place to sleep or slept in a shelter (including now)? N   Transportation Needs   In the past 12 months, has lack of transportation kept you from medical appointments or from getting medications? no   In the past 12 months, has lack of transportation kept you from meetings, work, or from getting things needed for daily living? No   Food Insecurity   Within the past 12 months, you worried that your food would run out before you got the money to buy more. Never true   Within the past 12 months, the food you bought just didn't last and you didn't have money to get more. Never true   Alcohol Use   Q1: How often do you have a drink containing alcohol? Never   Q2: How many drinks containing alcohol do you have on a typical day when you are drinking? None   Q3: How often do you have six or  more drinks on one occasion? Never   Relationship/Environment   Name(s) of Who Lives With Patient [facility]

## 2022-08-30 NOTE — H&P
[facility]  History & Physical    Patient Name: Annette Gracia  MRN: [id number]  Patient Class: IP- Inpatient  Admission Date: 8/29/2022  Attending Physician: Alice Barker*   Primary Care Provider: Jose Valles MD         Patient information was obtained from patient, nursing home, past medical records and ER records.     Subjective:     Principal Problem:Small bowel obstruction    Chief Complaint:   Chief Complaint   Patient presents with    Emesis     Arrives via ems from [facility]. Per [facility], pt has had several episodes of emesis today. Had xray of abdomen done and showed bowel obstruction. Date of xray unknown. Pt complains of lest lower abd pain, ostomy noted. Denies CP or SOB at this time.         HPI: Annette Garcia is an 86-year-old female with a history of history of CAD, disorder of kidney and ureter, HTN, lone atrial fibrillation, seizures, hypothyroidism who presented to the ED from [facility] for evaluation of recurrent episodes of vomiting as well as nausea today. She has multiple medical comorbidities including ostomy. She presented from [facility] to the ED for evaluation of multiple (she reports 40) episodes of vomiting yesterday. She associated vomiting with nausea, abdominal pain, lack of appetite, and fatigue. She has a noted LLQ colostomy with formed stool in pouch, no blood noted. She says her abdominal pain is currently gone however it is achy and intermittent in nature. She denies chest pain, SOB, fever, chills, changes in bowel or bladder changes, urinary frequency, burning, or odor. Of note she was admitted 5/20/22 for partial bowel obstruction.    In the ED: labs pretty unremarkable. UA with noted UTI. CT abdomen with High-grade small bowel obstruction with a transition point within a left lower quadrant parastomal hernia.  No evidence of bowel wall thickening, pneumatosis or gross perforation. 2. Distal  esophageal wall thickening and luminal fluid which may be related to reflux esophagitis.  Rectum further evaluation with EGD to exclude and underlying lesion if not already performed. 3. Tree-in-bud nodularity in the right middle lobe with mild bronchiectasis in the middle lobe and lingula.  Findings are concerning for an atypical infectious process such as MAC.  Mild aspiration not excluded. CXR with No acute cardiopulmonary abnormality. She was given IVF, pain medication, started on aztreonam for UTI. Admitted to [facility] for further care.      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    In the setting of septic shock and near death    Petit mal epilepsy 1954    Scoliosis of lumbar spine     Seizures     Unspecified hypothyroidism     UTI (urinary tract infection) 5/22/2022       Past Surgical History:   Procedure Laterality Date    APPENDECTOMY      BACK SURGERY  1988    vertebral fracture    BACK SURGERY  02/2013    lumbar L2-5    CATARACT EXTRACTION, BILATERAL Bilateral     CHOLECYSTECTOMY      open    EYE SURGERY Bilateral     cataract removal with lens implant    HYSTERECTOMY      PERCUTANEOUS TRANSLUMINAL ANGIOPLASTY (PTA) OF PERIPHERAL VESSEL N/A 2/3/2020    Procedure: PTA, PERIPHERAL VESSEL;  Surgeon: Timmy Simmons MD;  Location: [facility] CATH LAB/EP;  Service: Cardiology;  Laterality: N/A;    PORTACATH PLACEMENT Right 09/2016    RENAL ARTERY STENT Left 07/19/2017    SIGMOIDECTOMY  10/29/2019    SMALL INTESTINE SURGERY  08/23/2016    THROMBECTOMY N/A 2/3/2020    Procedure: THROMBECTOMY;  Surgeon: Timmy Simmons MD;  Location: [facility] CATH LAB/EP;  Service: Cardiology;  Laterality: N/A;    TONSILLECTOMY      VAGINAL HYSTERECTOMY W/ ANTERIOR AND POSTERIOR VAGINAL REPAIR          Review of patient's allergies indicates:   Allergen Reactions    Adhesive Itching and Blisters    Penicillins Anaphylaxis    Tramadol Hives    Avelox [moxifloxacin] Rash     Facial and arm itching and redness. Pt states throat closes when given.    Amoxil [amoxicillin]     Aspridrox [aspirin, buffered]     Codeine Other (See Comments)     Throat swelling    Keflex [cephalexin]      Tolerated cefepime and cefazolin    Norvasc [amlodipine]     Red dye Hives    Robitussin [guaifenesin]     Sulfa (sulfonamide antibiotics)     Tylenol [acetaminophen]      Has reaction to Tylenol with red dye and unable to take Extra Strength Tylenol/ CAN ONLY TOLERATE REG STRENGTH TYLENOL    Vicks vaporub [camphor-eucalyptus oil-menthol]        No current facility-administered medications on file prior to encounter.     Current Outpatient Medications on File Prior to Encounter   Medication Sig    acetaminophen (TYLENOL) 325 MG tablet Take 2 tablets (650 mg total) by mouth every 4 (four) hours as needed for Pain.    apixaban (ELIQUIS) 2.5 mg Tab Take 1 tablet (2.5 mg total) by mouth 2 (two) times daily.    calcium-vitamin D 600 mg(1,500mg) -400 unit Tab Take 1 tablet by mouth once daily.    levothyroxine (SYNTHROID) 125 MCG tablet TAKE 1 TABLET (125 MCG TOTAL) BY MOUTH ONCE DAILY.    losartan (COZAAR) 50 MG tablet Take 1 tablet (50 mg total) by mouth once daily.    magnesium oxide (MAG-OX) 400 mg (241.3 mg magnesium) tablet Take 2 tablets (800 mg total) by mouth once daily.    metoprolol tartrate (LOPRESSOR) 25 MG tablet Take 1 tablet (25 mg total) by mouth 2 (two) times daily.    ondansetron (ZOFRAN) 8 MG tablet Take 8 mg by mouth every 6 (six) hours as needed for Nausea (and vomiting).    OXcarbazepine (TRILEPTAL) 150 MG Tab Take 150 mg by mouth nightly.    PHENobarbitaL (LUMINAL) 97.2 MG tablet Take 1 tablet (97.2 mg total) by mouth 2 (two) times daily. (Patient taking differently: Take 97.2 mg by mouth  nightly.)    pravastatin (PRAVACHOL) 20 MG tablet Take 20 mg by mouth once daily.    phenoL (CHLORASEPTIC) 0.5 % SprA by Mucous Membrane route 2 (two) times daily as needed (sore throat).    polyethylene glycol (GLYCOLAX) 17 gram PwPk Take 17 g by mouth once daily. (Patient taking differently: Take 17 g by mouth 2 (two) times daily as needed (constipation). Mix with 8 ounces of liquid)     Family History       Problem Relation (Age of Onset)    Heart attack Father    Hypertension Father    Pancreatic cancer Mother          Tobacco Use    Smoking status: Never    Smokeless tobacco: Never   Substance and Sexual Activity    Alcohol use: No    Drug use: No    Sexual activity: Not on file     Review of Systems   Constitutional:  Positive for appetite change.   HENT: Negative.     Eyes: Negative.    Respiratory: Negative.     Gastrointestinal:  Positive for abdominal pain, nausea and vomiting.   Endocrine: Negative.    Genitourinary: Negative.    Musculoskeletal: Negative.    Skin: Negative.    Neurological:  Positive for weakness.   Psychiatric/Behavioral: Negative.     Objective:     Vital Signs (Most Recent):  Temp: 98.3 °F (36.8 °C) (08/30/22 0336)  Pulse: 91 (08/30/22 0336)  Resp: 16 (08/30/22 0336)  BP: (!) 150/69 (08/30/22 0336)  SpO2: 97 % (08/30/22 0336)   Vital Signs (24h Range):  Temp:  [98.3 °F (36.8 °C)-99.4 °F (37.4 °C)] 98.3 °F (36.8 °C)  Pulse:  [] 91  Resp:  [11-38] 16  SpO2:  [88 %-98 %] 97 %  BP: (150-213)/() 150/69     Weight: 71.7 kg (158 lb)  Body mass index is 33.02 kg/m².    Physical Exam  Vitals and nursing note reviewed.   Constitutional:       Appearance: Normal appearance. She is obese. She is not ill-appearing, toxic-appearing or diaphoretic.   HENT:      Head: Normocephalic and atraumatic.      Mouth/Throat:      Mouth: Mucous membranes are dry.   Eyes:      Pupils: Pupils are equal, round, and reactive to light.   Cardiovascular:      Rate and Rhythm: Normal rate and  regular rhythm.      Pulses: Normal pulses.      Heart sounds: Normal heart sounds.   Pulmonary:      Effort: Pulmonary effort is normal. No respiratory distress.      Breath sounds: Normal breath sounds.   Abdominal:      General: The ostomy site is clean. Bowel sounds are normal. There is distension (mild).      Palpations: Abdomen is soft.      Tenderness: There is no abdominal tenderness.      Comments:   Left colostomy noted with brown formed stool   Musculoskeletal:         General: No swelling. Normal range of motion.      Cervical back: Normal range of motion.   Skin:     General: Skin is warm.      Capillary Refill: Capillary refill takes 2 to 3 seconds.      Comments: Skin irritation noted around ostomy site   Neurological:      General: No focal deficit present.      Mental Status: She is alert and oriented to person, place, and time. Mental status is at baseline.   Psychiatric:         Mood and Affect: Mood normal.         Behavior: Behavior normal.         CRANIAL NERVES     CN III, IV, VI   Pupils are equal, round, and reactive to light.     Significant Labs: All pertinent labs within the past 24 hours have been reviewed.    Significant Imaging: I have reviewed all pertinent imaging results/findings within the past 24 hours.    Assessment/Plan:     * Small bowel obstruction  -c/o N/V and abdominal pain  -CT abdomen with High-grade small bowel obstruction with a transition point within a left lower quadrant parastomal hernia. No evidence of bowel wall thickening, pneumatosis or gross perforation.  -mild distention and tender to palpation around colostomy noted. Colostomy with brown stool in pouch  -NPO  -NGT to LIWS  -IVF  -consult general surgery    UTI (urinary tract infection)  -UA positive  -On aztreonam     Colostomy in place  -Noted  -medium amount formed brown stool noted  -site intact with irritation around--consult wound care      Chronic anemia  -stable  -monitor      Coronary artery  disease  -Resume home statin, BB  -Resume home eliquis upon surgery recs      Paroxysmal atrial fibrillation  Patient with Paroxysmal (<7 days) atrial fibrillation which is controlled currently with Beta Blocker. Patient is currently in sinus rhythm.[id number] Score: 4. HASBLED Score: Unable to calculate. Anticoagulation indicated. Anticoagulation done with home eliquis.  -awake general surgery rec before restarting home eliquis      Acquired hypothyroidism  -Resume home synthroid      Epilepsy  -Resume home trileptal and phenobarbital        VTE Risk Mitigation (From admission, onward)         Ordered     IP VTE HIGH RISK PATIENT  Once         08/30/22 0120     Place sequential compression device  Until discontinued         08/30/22 0120                   Zeinab Dodd DNP, AGACNP-BC  Hospitalist   Department of Hospital Medicine   [facility]   Office #: [phone]

## 2022-08-30 NOTE — ASSESSMENT & PLAN NOTE
87 yo female admitted with high-grade SBO associated with parastomal hernia of end colostomy. This will likely need operative management to relieve. We can attempt non operative measures initially.    - Recommend NG tube placement for decompression  - Hold Eliquis in case surgical intervention is needed later this week  - Monitor for ostomy output  - Surgery to follow

## 2022-08-30 NOTE — CONSULTS
[place] [facility]  General Surgery  Consult Note    Patient Name: Annette Garcia  MRN: [id number]  Code Status: Full Code  Admission Date: 8/29/2022  Hospital Length of Stay: 0 days  Attending Physician: Tyler Rocha, *  Primary Care Provider: Jose Valles MD    Patient information was obtained from patient and ER records.     General Surgery  Consult performed by: Keith Villalobos MD  Consult ordered by: Zeinab Dodd NP        Subjective:     Principal Problem: Small bowel obstruction    History of Present Illness: Ms. 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 She has had a parastomal hernia of the end colostomy with history of partial SBO. For the past couple of days she has had nausea and vomiting for which she presented to the ED. She has not had significant output from her colostomy for 4 days now. She also endorses abdominal pain, worse in the RLQ. No pain endorsed with the parastomal hernia. No fever, chills, chest pain, or shortness of breath.     Workup in  ED revealed a CT scan with high-grade SBO obstruction with transition point within the parastomal hernia. Lab work relatively benign. She takes Eliquis at home, which has been held since admission.      No current facility-administered medications on file prior to encounter.     Current Outpatient Medications on File Prior to Encounter   Medication Sig    acetaminophen (TYLENOL) 325 MG tablet Take 2 tablets (650 mg total) by mouth every 4 (four) hours as needed for Pain.    apixaban (ELIQUIS) 2.5 mg Tab Take 1 tablet (2.5 mg total) by mouth 2 (two) times daily.    calcium-vitamin D 600 mg(1,500mg) -400 unit Tab Take 1 tablet by mouth once daily.    levothyroxine (SYNTHROID) 125 MCG tablet TAKE 1 TABLET (125 MCG TOTAL) BY MOUTH ONCE DAILY.    losartan (COZAAR) 50 MG tablet Take 1 tablet (50 mg total) by mouth once daily.    magnesium oxide (MAG-OX) 400 mg  (241.3 mg magnesium) tablet Take 2 tablets (800 mg total) by mouth once daily.    metoprolol tartrate (LOPRESSOR) 25 MG tablet Take 1 tablet (25 mg total) by mouth 2 (two) times daily.    ondansetron (ZOFRAN) 8 MG tablet Take 8 mg by mouth every 6 (six) hours as needed for Nausea (and vomiting).    OXcarbazepine (TRILEPTAL) 150 MG Tab Take 150 mg by mouth nightly.    PHENobarbitaL (LUMINAL) 97.2 MG tablet Take 1 tablet (97.2 mg total) by mouth 2 (two) times daily. (Patient taking differently: Take 97.2 mg by mouth nightly.)    pravastatin (PRAVACHOL) 20 MG tablet Take 20 mg by mouth once daily.    phenoL (CHLORASEPTIC) 0.5 % SprA by Mucous Membrane route 2 (two) times daily as needed (sore throat).    polyethylene glycol (GLYCOLAX) 17 gram PwPk Take 17 g by mouth once daily. (Patient taking differently: Take 17 g by mouth 2 (two) times daily as needed (constipation). Mix with 8 ounces of liquid)       Review of patient's allergies indicates:   Allergen Reactions    Adhesive Itching and Blisters    Penicillins Anaphylaxis    Tramadol Hives    Avelox [moxifloxacin] Rash     Facial and arm itching and redness. Pt states throat closes when given.    Amoxil [amoxicillin]     Aspridrox [aspirin, buffered]     Codeine Other (See Comments)     Throat swelling    Keflex [cephalexin]      Tolerated cefepime and cefazolin    Norvasc [amlodipine]     Red dye Hives    Robitussin [guaifenesin]     Sulfa (sulfonamide antibiotics)     Tylenol [acetaminophen]      Has reaction to Tylenol with red dye and unable to take Extra Strength Tylenol/ CAN ONLY TOLERATE REG STRENGTH TYLENOL    Vicks vaporub [camphor-eucalyptus oil-menthol]        Past Medical History:   Diagnosis Date    Allergy     Arthritis     arms and legs-osteoarthritis    Cancer     colon    Coronary artery disease 08/14/2020    Digestive disorder     Disorder of kidney and ureter     E coli bacteremia 10/29/2019    Encounter for blood  transfusion     Hypertension     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    In the setting of septic shock and near death    Petit mal epilepsy 1954    Scoliosis of lumbar spine     Seizures     Unspecified hypothyroidism     UTI (urinary tract infection) 05/22/2022     Past Surgical History:   Procedure Laterality Date    APPENDECTOMY      BACK SURGERY  1988    vertebral fracture    BACK SURGERY  02/2013    lumbar L2-5    CATARACT EXTRACTION, BILATERAL Bilateral     CHOLECYSTECTOMY      open    EYE SURGERY Bilateral     cataract removal with lens implant    HYSTERECTOMY      PERCUTANEOUS TRANSLUMINAL ANGIOPLASTY (PTA) OF PERIPHERAL VESSEL N/A 2/3/2020    Procedure: PTA, PERIPHERAL VESSEL;  Surgeon: Timmy Simmons MD;  Location: [facility] CATH LAB/EP;  Service: Cardiology;  Laterality: N/A;    PORTACATH PLACEMENT Right 09/2016    RENAL ARTERY STENT Left 07/19/2017    SIGMOIDECTOMY  10/29/2019    SMALL INTESTINE SURGERY  08/23/2016    THROMBECTOMY N/A 2/3/2020    Procedure: THROMBECTOMY;  Surgeon: Timmy Simmons MD;  Location: [facility] CATH LAB/EP;  Service: Cardiology;  Laterality: N/A;    TONSILLECTOMY      VAGINAL HYSTERECTOMY W/ ANTERIOR AND POSTERIOR VAGINAL REPAIR       Family History       Problem Relation (Age of Onset)    Heart attack Father    Hypertension Father    Pancreatic cancer Mother          Tobacco Use    Smoking status: Never    Smokeless tobacco: Never   Substance and Sexual Activity    Alcohol use: No    Drug use: No    Sexual activity: Not on file     Review of Systems   Constitutional:  Negative for chills and fever.   Respiratory:  Negative for shortness of breath.    Cardiovascular:  Negative for chest pain.   Gastrointestinal:  Positive for abdominal distention, abdominal pain, nausea and vomiting.   Genitourinary:  Negative for dysuria.   Neurological:  Positive for dizziness and light-headedness.   Objective:     Vital Signs (Most Recent):  Temp: 97.1 °F (36.2 °C)  (08/30/22 1147)  Pulse: 71 (08/30/22 1147)  Resp: 18 (08/30/22 1147)  BP: (!) 124/55 (08/30/22 1147)  SpO2: (!) 93 % (08/30/22 1147)   Vital Signs (24h Range):  Temp:  [97.1 °F (36.2 °C)-99.4 °F (37.4 °C)] 97.1 °F (36.2 °C)  Pulse:  [] 71  Resp:  [11-38] 18  SpO2:  [88 %-98 %] 93 %  BP: (124-213)/() 124/55     Weight: 73.5 kg (162 lb 0.6 oz)  Body mass index is 33.87 kg/m².    Physical Exam  Constitutional:       Appearance: Normal appearance.   Cardiovascular:      Rate and Rhythm: Normal rate.   Pulmonary:      Effort: Pulmonary effort is normal.   Abdominal:      Palpations: Abdomen is soft.      Comments: LLQ end colostomy with associated parastomal hernia, non tender to palpation. Tenderness to palpation in the RLQ.   Skin:     General: Skin is warm and dry.   Neurological:      General: No focal deficit present.      Mental Status: She is alert and oriented to person, place, and time.       Significant Labs:  I have reviewed all pertinent lab results within the past 24 hours.  CBC:   Recent Labs   Lab 08/30/22  0633   WBC 9.63   RBC 3.57*   HGB 11.9*   HCT 35.6*      *   MCH 33.3*   MCHC 33.4     BMP:   Recent Labs   Lab 08/29/22  2200   *      K 3.7   CL 97   CO2 26   BUN 18   CREATININE 0.8   CALCIUM 9.3   MG 1.8       Significant Diagnostics:  I have reviewed all pertinent imaging results/findings within the past 24 hours.      Assessment/Plan:     * Small bowel obstruction  85 yo female admitted with high-grade SBO associated with parastomal hernia of end colostomy. This will likely need operative management to relieve. We can attempt non operative measures initially.    - Recommend NG tube placement for decompression  - Hold Eliquis in case surgical intervention is needed later this week  - Monitor for ostomy output  - Surgery to follow      VTE Risk Mitigation (From admission, onward)         Ordered     IP VTE HIGH RISK PATIENT  Once         08/30/22 0120     Place  sequential compression device  Until discontinued         08/30/22 0120                Thank you for your consult. I will follow-up with patient. Please contact us if you have any additional questions.    Keith Villalobos MD  General Surgery  [place] [facility]

## 2022-08-30 NOTE — HPI
Annette Garcia is an 86-year-old female with a history of history of CAD, disorder of kidney and ureter, HTN, lone atrial fibrillation, seizures, hypothyroidism who presented to the ED from [facility] for evaluation of recurrent episodes of vomiting as well as nausea today. She has multiple medical comorbidities including ostomy. She presented from [facility] to the ED for evaluation of multiple (she reports 40) episodes of vomiting yesterday. She associated vomiting with nausea, abdominal pain, lack of appetite, and fatigue. She has a noted LLQ colostomy with formed stool in pouch, no blood noted. She says her abdominal pain is currently gone however it is achy and intermittent in nature. She denies chest pain, SOB, fever, chills, changes in bowel or bladder changes, urinary frequency, burning, or odor. Of note she was admitted 5/20/22 for partial bowel obstruction.    In the ED: labs pretty unremarkable. UA with noted UTI. CT abdomen with High-grade small bowel obstruction with a transition point within a left lower quadrant parastomal hernia.  No evidence of bowel wall thickening, pneumatosis or gross perforation. 2. Distal esophageal wall thickening and luminal fluid which may be related to reflux esophagitis.  Rectum further evaluation with EGD to exclude and underlying lesion if not already performed. 3. Tree-in-bud nodularity in the right middle lobe with mild bronchiectasis in the middle lobe and lingula.  Findings are concerning for an atypical infectious process such as MAC.  Mild aspiration not excluded. CXR with No acute cardiopulmonary abnormality. She was given IVF, pain medication, started on aztreonam for UTI. Admitted to [facility] for further care.

## 2022-08-30 NOTE — SUBJECTIVE & OBJECTIVE
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    In the setting of septic shock and near death    Petit mal epilepsy 1954    Scoliosis of lumbar spine     Seizures     Unspecified hypothyroidism     UTI (urinary tract infection) 5/22/2022       Past Surgical History:   Procedure Laterality Date    APPENDECTOMY      BACK SURGERY  1988    vertebral fracture    BACK SURGERY  02/2013    lumbar L2-5    CATARACT EXTRACTION, BILATERAL Bilateral     CHOLECYSTECTOMY      open    EYE SURGERY Bilateral     cataract removal with lens implant    HYSTERECTOMY      PERCUTANEOUS TRANSLUMINAL ANGIOPLASTY (PTA) OF PERIPHERAL VESSEL N/A 2/3/2020    Procedure: PTA, PERIPHERAL VESSEL;  Surgeon: Timmy Simmons MD;  Location: [facility] CATH LAB/EP;  Service: Cardiology;  Laterality: N/A;    PORTACATH PLACEMENT Right 09/2016    RENAL ARTERY STENT Left 07/19/2017    SIGMOIDECTOMY  10/29/2019    SMALL INTESTINE SURGERY  08/23/2016    THROMBECTOMY N/A 2/3/2020    Procedure: THROMBECTOMY;  Surgeon: Timmy Simmons MD;  Location: [facility] CATH LAB/EP;  Service: Cardiology;  Laterality: N/A;    TONSILLECTOMY      VAGINAL HYSTERECTOMY W/ ANTERIOR AND POSTERIOR VAGINAL REPAIR         Review of patient's allergies indicates:   Allergen Reactions    Adhesive Itching and Blisters    Penicillins Anaphylaxis    Tramadol Hives    Avelox [moxifloxacin] Rash     Facial and arm itching and redness. Pt states throat closes when given.    Amoxil [amoxicillin]     Aspridrox [aspirin, buffered]     Codeine Other (See Comments)     Throat swelling    Keflex [cephalexin]      Tolerated cefepime and cefazolin    Norvasc [amlodipine]     Red dye Hives    Robitussin [guaifenesin]     Sulfa (sulfonamide antibiotics)     Tylenol [acetaminophen]      Has reaction to Tylenol with red dye and  unable to take Extra Strength Tylenol/ CAN ONLY TOLERATE REG STRENGTH TYLENOL    Vicks vaporub [camphor-eucalyptus oil-menthol]        No current facility-administered medications on file prior to encounter.     Current Outpatient Medications on File Prior to Encounter   Medication Sig    acetaminophen (TYLENOL) 325 MG tablet Take 2 tablets (650 mg total) by mouth every 4 (four) hours as needed for Pain.    apixaban (ELIQUIS) 2.5 mg Tab Take 1 tablet (2.5 mg total) by mouth 2 (two) times daily.    calcium-vitamin D 600 mg(1,500mg) -400 unit Tab Take 1 tablet by mouth once daily.    levothyroxine (SYNTHROID) 125 MCG tablet TAKE 1 TABLET (125 MCG TOTAL) BY MOUTH ONCE DAILY.    losartan (COZAAR) 50 MG tablet Take 1 tablet (50 mg total) by mouth once daily.    magnesium oxide (MAG-OX) 400 mg (241.3 mg magnesium) tablet Take 2 tablets (800 mg total) by mouth once daily.    metoprolol tartrate (LOPRESSOR) 25 MG tablet Take 1 tablet (25 mg total) by mouth 2 (two) times daily.    ondansetron (ZOFRAN) 8 MG tablet Take 8 mg by mouth every 6 (six) hours as needed for Nausea (and vomiting).    OXcarbazepine (TRILEPTAL) 150 MG Tab Take 150 mg by mouth nightly.    PHENobarbitaL (LUMINAL) 97.2 MG tablet Take 1 tablet (97.2 mg total) by mouth 2 (two) times daily. (Patient taking differently: Take 97.2 mg by mouth nightly.)    pravastatin (PRAVACHOL) 20 MG tablet Take 20 mg by mouth once daily.    phenoL (CHLORASEPTIC) 0.5 % SprA by Mucous Membrane route 2 (two) times daily as needed (sore throat).    polyethylene glycol (GLYCOLAX) 17 gram PwPk Take 17 g by mouth once daily. (Patient taking differently: Take 17 g by mouth 2 (two) times daily as needed (constipation). Mix with 8 ounces of liquid)     Family History       Problem Relation (Age of Onset)    Heart attack Father    Hypertension Father    Pancreatic cancer Mother          Tobacco Use    Smoking status: Never    Smokeless tobacco: Never   Substance and Sexual Activity     Alcohol use: No    Drug use: No    Sexual activity: Not on file     Review of Systems   Constitutional:  Positive for appetite change.   HENT: Negative.     Eyes: Negative.    Respiratory: Negative.     Gastrointestinal:  Positive for abdominal pain, nausea and vomiting.   Endocrine: Negative.    Genitourinary: Negative.    Musculoskeletal: Negative.    Skin: Negative.    Neurological:  Positive for weakness.   Psychiatric/Behavioral: Negative.     Objective:     Vital Signs (Most Recent):  Temp: 98.3 °F (36.8 °C) (08/30/22 0336)  Pulse: 91 (08/30/22 0336)  Resp: 16 (08/30/22 0336)  BP: (!) 150/69 (08/30/22 0336)  SpO2: 97 % (08/30/22 0336)   Vital Signs (24h Range):  Temp:  [98.3 °F (36.8 °C)-99.4 °F (37.4 °C)] 98.3 °F (36.8 °C)  Pulse:  [] 91  Resp:  [11-38] 16  SpO2:  [88 %-98 %] 97 %  BP: (150-213)/() 150/69     Weight: 71.7 kg (158 lb)  Body mass index is 33.02 kg/m².    Physical Exam  Vitals and nursing note reviewed.   Constitutional:       Appearance: Normal appearance. She is obese. She is not ill-appearing, toxic-appearing or diaphoretic.   HENT:      Head: Normocephalic and atraumatic.      Mouth/Throat:      Mouth: Mucous membranes are dry.   Eyes:      Pupils: Pupils are equal, round, and reactive to light.   Cardiovascular:      Rate and Rhythm: Normal rate and regular rhythm.      Pulses: Normal pulses.      Heart sounds: Normal heart sounds.   Pulmonary:      Effort: Pulmonary effort is normal. No respiratory distress.      Breath sounds: Normal breath sounds.   Abdominal:      General: The ostomy site is clean. Bowel sounds are normal. There is distension (mild).      Palpations: Abdomen is soft.      Tenderness: There is no abdominal tenderness.      Comments:   Left colostomy noted with brown formed stool   Musculoskeletal:         General: No swelling. Normal range of motion.      Cervical back: Normal range of motion.   Skin:     General: Skin is warm.      Capillary Refill:  Capillary refill takes 2 to 3 seconds.      Comments: Skin irritation noted around ostomy site   Neurological:      General: No focal deficit present.      Mental Status: She is alert and oriented to person, place, and time. Mental status is at baseline.   Psychiatric:         Mood and Affect: Mood normal.         Behavior: Behavior normal.         CRANIAL NERVES     CN III, IV, VI   Pupils are equal, round, and reactive to light.     Significant Labs: All pertinent labs within the past 24 hours have been reviewed.    Significant Imaging: I have reviewed all pertinent imaging results/findings within the past 24 hours.

## 2022-08-30 NOTE — PLAN OF CARE
Problem: Adult Inpatient Plan of Care  Goal: Plan of Care Review  Outcome: Ongoing, Progressing     VIRTUAL NURSE:  Cued into patient's room.  Permission received per patient to turn camera to view patient.  Introduced as VN for night shift that will be working with floor nurse and nursing assistant.  Educated patient on VN's role in patient care and  [facility] model.  Plan of care reviewed with patient.  Education per flowsheet.   Informed patient that staff will round on them every 2 hours but to use call light for any other needs they may have; informed of fall risk and fall precautions.  Patient verbalized understanding.  Call light within reach; bed siderails up x3.  Opportunity given for questions and questions answered.  Admission assessment questions answered.  Patient denies complaints or any needs at this time. Instructed to call for assistance.  Will cont to monitor and intervene as needed.    Labs, notes, orders, and careplan reviewed.       08/30/22 0421   Patient Request   Patient Requested no complaints or needs currently; hard of hearing   Admission   Initial VN Admission Questions Complete   Communication Issues? Patient Hearing;Technical Issue   Shift   Virtual Nurse - Rounding Complete   Pain Management Interventions pain management plan reviewed with patient/caregiver   Virtual Nurse - Patient Verbalized Approval Of Camera Use;VN Rounding   Type of Frequent Check   Type Patient Rounds   Safety/Activity   Patient Rounds bed in low position;visualized patient;bed wheels locked;placement of personal items at bedside;call light in patient/parent reach   Safety Promotion/Fall Prevention assistive device/personal item within reach;diversional activities provided;Fall Risk reviewed with patient/family;high risk medications identified;medications reviewed;nonskid shoes/socks when out of bed;room near unit station;side rails raised x 3;supervised activity;instructed to call staff for mobility   Safety  Precautions emergency equipment at bedside   Positioning   Body Position neutral body alignment;neutral head position   Head of Bed (HOB) Positioning HOB at 60 degrees   Pain/Comfort/Sleep   Preferred Pain Scale number (Numeric Rating Pain Scale)   Comfort/Acceptable Pain Level 3   Pain Rating (0-10): Rest 0   POSS (Pasero Opioid-Induced Sed Scale) 1 - Awake and alert   Sleep/Rest/Relaxation no problem identified;awake

## 2022-08-30 NOTE — SUBJECTIVE & OBJECTIVE
No current facility-administered medications on file prior to encounter.     Current Outpatient Medications on File Prior to Encounter   Medication Sig    acetaminophen (TYLENOL) 325 MG tablet Take 2 tablets (650 mg total) by mouth every 4 (four) hours as needed for Pain.    apixaban (ELIQUIS) 2.5 mg Tab Take 1 tablet (2.5 mg total) by mouth 2 (two) times daily.    calcium-vitamin D 600 mg(1,500mg) -400 unit Tab Take 1 tablet by mouth once daily.    levothyroxine (SYNTHROID) 125 MCG tablet TAKE 1 TABLET (125 MCG TOTAL) BY MOUTH ONCE DAILY.    losartan (COZAAR) 50 MG tablet Take 1 tablet (50 mg total) by mouth once daily.    magnesium oxide (MAG-OX) 400 mg (241.3 mg magnesium) tablet Take 2 tablets (800 mg total) by mouth once daily.    metoprolol tartrate (LOPRESSOR) 25 MG tablet Take 1 tablet (25 mg total) by mouth 2 (two) times daily.    ondansetron (ZOFRAN) 8 MG tablet Take 8 mg by mouth every 6 (six) hours as needed for Nausea (and vomiting).    OXcarbazepine (TRILEPTAL) 150 MG Tab Take 150 mg by mouth nightly.    PHENobarbitaL (LUMINAL) 97.2 MG tablet Take 1 tablet (97.2 mg total) by mouth 2 (two) times daily. (Patient taking differently: Take 97.2 mg by mouth nightly.)    pravastatin (PRAVACHOL) 20 MG tablet Take 20 mg by mouth once daily.    phenoL (CHLORASEPTIC) 0.5 % SprA by Mucous Membrane route 2 (two) times daily as needed (sore throat).    polyethylene glycol (GLYCOLAX) 17 gram PwPk Take 17 g by mouth once daily. (Patient taking differently: Take 17 g by mouth 2 (two) times daily as needed (constipation). Mix with 8 ounces of liquid)       Review of patient's allergies indicates:   Allergen Reactions    Adhesive Itching and Blisters    Penicillins Anaphylaxis    Tramadol Hives    Avelox [moxifloxacin] Rash     Facial and arm itching and redness. Pt states throat closes when given.    Amoxil [amoxicillin]     Aspridrox [aspirin, buffered]     Codeine Other (See Comments)     Throat swelling    Keflex  [cephalexin]      Tolerated cefepime and cefazolin    Norvasc [amlodipine]     Red dye Hives    Robitussin [guaifenesin]     Sulfa (sulfonamide antibiotics)     Tylenol [acetaminophen]      Has reaction to Tylenol with red dye and unable to take Extra Strength Tylenol/ CAN ONLY TOLERATE REG STRENGTH TYLENOL    Vicks vaporub [camphor-eucalyptus oil-menthol]        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     Past Surgical History:   Procedure Laterality Date    APPENDECTOMY      BACK SURGERY  1988    vertebral fracture    BACK SURGERY  02/2013    lumbar L2-5    CATARACT EXTRACTION, BILATERAL Bilateral     CHOLECYSTECTOMY      open    EYE SURGERY Bilateral     cataract removal with lens implant    HYSTERECTOMY      PERCUTANEOUS TRANSLUMINAL ANGIOPLASTY (PTA) OF PERIPHERAL VESSEL N/A 2/3/2020    Procedure: PTA, PERIPHERAL VESSEL;  Surgeon: Timmy Simmons MD;  Location: [facility] CATH LAB/EP;  Service: Cardiology;  Laterality: N/A;    PORTACATH PLACEMENT Right 09/2016    RENAL ARTERY STENT Left 07/19/2017    SIGMOIDECTOMY  10/29/2019    SMALL INTESTINE SURGERY  08/23/2016    THROMBECTOMY N/A 2/3/2020    Procedure: THROMBECTOMY;  Surgeon: Timmy Simmons MD;  Location: [facility] CATH LAB/EP;  Service: Cardiology;  Laterality: N/A;    TONSILLECTOMY      VAGINAL HYSTERECTOMY W/ ANTERIOR AND POSTERIOR VAGINAL REPAIR       Family History       Problem Relation (Age of Onset)    Heart attack Father    Hypertension Father    Pancreatic cancer Mother           Tobacco Use    Smoking status: Never    Smokeless tobacco: Never   Substance and Sexual Activity    Alcohol use: No    Drug use: No    Sexual activity: Not on file     Review of Systems   Constitutional:  Negative for chills and fever.   Respiratory:  Negative for shortness of breath.    Cardiovascular:  Negative for chest pain.   Gastrointestinal:  Positive for abdominal distention, abdominal pain, nausea and vomiting.   Genitourinary:  Negative for dysuria.   Neurological:  Positive for dizziness and light-headedness.   Objective:     Vital Signs (Most Recent):  Temp: 97.1 °F (36.2 °C) (08/30/22 1147)  Pulse: 71 (08/30/22 1147)  Resp: 18 (08/30/22 1147)  BP: (!) 124/55 (08/30/22 1147)  SpO2: (!) 93 % (08/30/22 1147)   Vital Signs (24h Range):  Temp:  [97.1 °F (36.2 °C)-99.4 °F (37.4 °C)] 97.1 °F (36.2 °C)  Pulse:  [] 71  Resp:  [11-38] 18  SpO2:  [88 %-98 %] 93 %  BP: (124-213)/() 124/55     Weight: 73.5 kg (162 lb 0.6 oz)  Body mass index is 33.87 kg/m².    Physical Exam  Constitutional:       Appearance: Normal appearance.   Cardiovascular:      Rate and Rhythm: Normal rate.   Pulmonary:      Effort: Pulmonary effort is normal.   Abdominal:      Palpations: Abdomen is soft.      Comments: LLQ end colostomy with associated parastomal hernia, non tender to palpation. Tenderness to palpation in the RLQ.   Skin:     General: Skin is warm and dry.   Neurological:      General: No focal deficit present.      Mental Status: She is alert and oriented to person, place, and time.       Significant Labs:  I have reviewed all pertinent lab results within the past 24 hours.  CBC:   Recent Labs   Lab 08/30/22  0633   WBC 9.63   RBC 3.57*   HGB 11.9*   HCT 35.6*      *   MCH 33.3*   MCHC 33.4     BMP:   Recent Labs   Lab 08/29/22  2200   *      K 3.7   CL 97   CO2 26   BUN 18   CREATININE 0.8   CALCIUM 9.3   MG 1.8       Significant Diagnostics:  I have reviewed all pertinent  imaging results/findings within the past 24 hours.

## 2022-08-31 ENCOUNTER — ANESTHESIA EVENT (OUTPATIENT)
Dept: SURGERY | Facility: HOSPITAL | Age: 87
DRG: 354 | End: 2022-08-31
Payer: MEDICARE

## 2022-08-31 ENCOUNTER — PATIENT OUTREACH (OUTPATIENT)
Dept: ADMINISTRATIVE | Facility: OTHER | Age: 87
End: 2022-08-31
Payer: MEDICARE

## 2022-08-31 LAB
ALBUMIN SERPL BCP-MCNC: 3.3 G/DL (ref 3.5–5.2)
ALP SERPL-CCNC: 78 U/L (ref 55–135)
ALT SERPL W/O P-5'-P-CCNC: 18 U/L (ref 10–44)
ANION GAP SERPL CALC-SCNC: 15 MMOL/L (ref 8–16)
AST SERPL-CCNC: 16 U/L (ref 10–40)
BACTERIA UR CULT: ABNORMAL
BASOPHILS # BLD AUTO: 0.03 K/UL (ref 0–0.2)
BASOPHILS NFR BLD: 0.4 % (ref 0–1.9)
BILIRUB SERPL-MCNC: 0.4 MG/DL (ref 0.1–1)
BUN SERPL-MCNC: 15 MG/DL (ref 8–23)
CALCIUM SERPL-MCNC: 8.5 MG/DL (ref 8.7–10.5)
CHLORIDE SERPL-SCNC: 93 MMOL/L (ref 95–110)
CO2 SERPL-SCNC: 24 MMOL/L (ref 23–29)
CREAT SERPL-MCNC: 0.7 MG/DL (ref 0.5–1.4)
DIFFERENTIAL METHOD: ABNORMAL
EOSINOPHIL # BLD AUTO: 0.1 K/UL (ref 0–0.5)
EOSINOPHIL NFR BLD: 1.4 % (ref 0–8)
ERYTHROCYTE [DISTWIDTH] IN BLOOD BY AUTOMATED COUNT: 13.8 % (ref 11.5–14.5)
ERYTHROCYTE [DISTWIDTH] IN BLOOD BY AUTOMATED COUNT: 13.8 % (ref 11.5–14.5)
EST. GFR  (NO RACE VARIABLE): >60 ML/MIN/1.73 M^2
GLUCOSE SERPL-MCNC: 185 MG/DL (ref 70–110)
HCT VFR BLD AUTO: 31.8 % (ref 37–48.5)
HCT VFR BLD AUTO: 34 % (ref 37–48.5)
HGB BLD-MCNC: 10.3 G/DL (ref 12–16)
HGB BLD-MCNC: 11.5 G/DL (ref 12–16)
IMM GRANULOCYTES # BLD AUTO: 0.02 K/UL (ref 0–0.04)
IMM GRANULOCYTES NFR BLD AUTO: 0.3 % (ref 0–0.5)
LYMPHOCYTES # BLD AUTO: 1.7 K/UL (ref 1–4.8)
LYMPHOCYTES NFR BLD: 22 % (ref 18–48)
MCH RBC QN AUTO: 33 PG (ref 27–31)
MCH RBC QN AUTO: 33.1 PG (ref 27–31)
MCHC RBC AUTO-ENTMCNC: 32.4 G/DL (ref 32–36)
MCHC RBC AUTO-ENTMCNC: 33.8 G/DL (ref 32–36)
MCV RBC AUTO: 102 FL (ref 82–98)
MCV RBC AUTO: 98 FL (ref 82–98)
MONOCYTES # BLD AUTO: 0.8 K/UL (ref 0.3–1)
MONOCYTES NFR BLD: 10.1 % (ref 4–15)
MRSA ID BY PCR: NEGATIVE
NEUTROPHILS # BLD AUTO: 5.2 K/UL (ref 1.8–7.7)
NEUTROPHILS NFR BLD: 65.8 % (ref 38–73)
NRBC BLD-RTO: 0 /100 WBC
PLATELET # BLD AUTO: 149 K/UL (ref 150–450)
PLATELET # BLD AUTO: 177 K/UL (ref 150–450)
PMV BLD AUTO: 9.9 FL (ref 9.2–12.9)
PMV BLD AUTO: 9.9 FL (ref 9.2–12.9)
POTASSIUM SERPL-SCNC: 3 MMOL/L (ref 3.5–5.1)
PROT SERPL-MCNC: 6.3 G/DL (ref 6–8.4)
RBC # BLD AUTO: 3.12 M/UL (ref 4–5.4)
RBC # BLD AUTO: 3.47 M/UL (ref 4–5.4)
SODIUM SERPL-SCNC: 132 MMOL/L (ref 136–145)
STAPH AUREUS ID BY PCR: NEGATIVE
WBC # BLD AUTO: 7.9 K/UL (ref 3.9–12.7)
WBC # BLD AUTO: 8.68 K/UL (ref 3.9–12.7)

## 2022-08-31 PROCEDURE — 87150 DNA/RNA AMPLIFIED PROBE: CPT | Performed by: EMERGENCY MEDICINE

## 2022-08-31 PROCEDURE — S0073 INJECTION, AZTREONAM, 500 MG: HCPCS | Performed by: HOSPITALIST

## 2022-08-31 PROCEDURE — 25000003 PHARM REV CODE 250: Performed by: NURSE PRACTITIONER

## 2022-08-31 PROCEDURE — 25500020 PHARM REV CODE 255: Performed by: STUDENT IN AN ORGANIZED HEALTH CARE EDUCATION/TRAINING PROGRAM

## 2022-08-31 PROCEDURE — 80053 COMPREHEN METABOLIC PANEL: CPT | Performed by: INTERNAL MEDICINE

## 2022-08-31 PROCEDURE — 36415 COLL VENOUS BLD VENIPUNCTURE: CPT | Performed by: INTERNAL MEDICINE

## 2022-08-31 PROCEDURE — 99900035 HC TECH TIME PER 15 MIN (STAT)

## 2022-08-31 PROCEDURE — 63600175 PHARM REV CODE 636 W HCPCS: Performed by: HOSPITALIST

## 2022-08-31 PROCEDURE — 99222 1ST HOSP IP/OBS MODERATE 55: CPT | Mod: ,,, | Performed by: STUDENT IN AN ORGANIZED HEALTH CARE EDUCATION/TRAINING PROGRAM

## 2022-08-31 PROCEDURE — 85025 COMPLETE CBC W/AUTO DIFF WBC: CPT | Performed by: NURSE PRACTITIONER

## 2022-08-31 PROCEDURE — 85027 COMPLETE CBC AUTOMATED: CPT | Performed by: INTERNAL MEDICINE

## 2022-08-31 PROCEDURE — 99222 PR INITIAL HOSPITAL CARE,LEVL II: ICD-10-PCS | Mod: ,,, | Performed by: STUDENT IN AN ORGANIZED HEALTH CARE EDUCATION/TRAINING PROGRAM

## 2022-08-31 PROCEDURE — 94760 N-INVAS EAR/PLS OXIMETRY 1: CPT

## 2022-08-31 PROCEDURE — 11000001 HC ACUTE MED/SURG PRIVATE ROOM

## 2022-08-31 PROCEDURE — A4216 STERILE WATER/SALINE, 10 ML: HCPCS | Performed by: NURSE PRACTITIONER

## 2022-08-31 PROCEDURE — 25000003 PHARM REV CODE 250: Performed by: HOSPITALIST

## 2022-08-31 PROCEDURE — 36415 COLL VENOUS BLD VENIPUNCTURE: CPT | Performed by: NURSE PRACTITIONER

## 2022-08-31 PROCEDURE — 63600175 PHARM REV CODE 636 W HCPCS: Performed by: NURSE PRACTITIONER

## 2022-08-31 RX ORDER — ACETAMINOPHEN 325 MG/1
650 TABLET ORAL EVERY 6 HOURS PRN
Status: DISCONTINUED | OUTPATIENT
Start: 2022-08-31 | End: 2022-09-06 | Stop reason: HOSPADM

## 2022-08-31 RX ORDER — MORPHINE SULFATE 2 MG/ML
2 INJECTION, SOLUTION INTRAMUSCULAR; INTRAVENOUS EVERY 6 HOURS PRN
Status: DISCONTINUED | OUTPATIENT
Start: 2022-08-31 | End: 2022-08-31

## 2022-08-31 RX ADMIN — AZTREONAM 2000 MG: 2 INJECTION, POWDER, LYOPHILIZED, FOR SOLUTION INTRAMUSCULAR; INTRAVENOUS at 05:08

## 2022-08-31 RX ADMIN — PRAVASTATIN SODIUM 20 MG: 10 TABLET ORAL at 09:08

## 2022-08-31 RX ADMIN — SODIUM CHLORIDE: 0.9 INJECTION, SOLUTION INTRAVENOUS at 05:08

## 2022-08-31 RX ADMIN — METOPROLOL TARTRATE 25 MG: 25 TABLET, FILM COATED ORAL at 09:08

## 2022-08-31 RX ADMIN — ONDANSETRON 4 MG: 2 INJECTION INTRAMUSCULAR; INTRAVENOUS at 08:08

## 2022-08-31 RX ADMIN — ONDANSETRON 4 MG: 2 INJECTION INTRAMUSCULAR; INTRAVENOUS at 12:08

## 2022-08-31 RX ADMIN — Medication 800 MG: at 09:08

## 2022-08-31 RX ADMIN — PHENOBARBITAL 97.2 MG: 32.4 TABLET ORAL at 09:08

## 2022-08-31 RX ADMIN — SODIUM CHLORIDE: 0.9 INJECTION, SOLUTION INTRAVENOUS at 03:08

## 2022-08-31 RX ADMIN — LOSARTAN POTASSIUM 50 MG: 50 TABLET, FILM COATED ORAL at 09:08

## 2022-08-31 RX ADMIN — AZTREONAM 2000 MG: 2 INJECTION, POWDER, LYOPHILIZED, FOR SOLUTION INTRAMUSCULAR; INTRAVENOUS at 09:08

## 2022-08-31 RX ADMIN — Medication 10 ML: at 06:08

## 2022-08-31 RX ADMIN — HYDRALAZINE HYDROCHLORIDE 10 MG: 20 INJECTION, SOLUTION INTRAMUSCULAR; INTRAVENOUS at 12:08

## 2022-08-31 RX ADMIN — HYDRALAZINE HYDROCHLORIDE 10 MG: 20 INJECTION, SOLUTION INTRAMUSCULAR; INTRAVENOUS at 06:08

## 2022-08-31 RX ADMIN — OXCARBAZEPINE 150 MG: 150 TABLET, FILM COATED ORAL at 09:08

## 2022-08-31 RX ADMIN — LEVOTHYROXINE SODIUM 125 MCG: 75 TABLET ORAL at 05:08

## 2022-08-31 RX ADMIN — PROCHLORPERAZINE EDISYLATE 5 MG: 5 INJECTION INTRAMUSCULAR; INTRAVENOUS at 12:08

## 2022-08-31 RX ADMIN — Medication 10 ML: at 11:08

## 2022-08-31 RX ADMIN — DIATRIZOATE MEGLUMINE AND DIATRIZOATE SODIUM 100 ML: 660; 100 LIQUID ORAL; RECTAL at 11:08

## 2022-08-31 RX ADMIN — Medication 10 ML: at 01:08

## 2022-08-31 RX ADMIN — VANCOMYCIN HYDROCHLORIDE 1750 MG: 500 INJECTION, POWDER, LYOPHILIZED, FOR SOLUTION INTRAVENOUS at 06:08

## 2022-08-31 NOTE — NURSING
Care plan reviewed. Pt remains on cardiac monitor and currently NG tube on Intermittent suction. Had CT today, vomiting episode x1 after receiving dose of gastroview to prep for CT.  PRN Zofran given without relief, prochlorperazine given as second choice providing moderate to full relief after a couple of minutes. PRN dose of hydralazine also administered as ordered, medication effective. Bp documented. Colostomy bag changed, liquid stool in bag, documented in flowsheet. Pt is to remains NPO and instructed nothing by mouth after midnight for procedure tomorrow with Dr. Johnson. Family at bedside. Lab also called to report positive blood cultures collected on 8/29/22,  made aware. New order for IV antibiotics in place. Safety maintained.

## 2022-08-31 NOTE — PROGRESS NOTES
TONEW met with patient and pt children to discuss discharge and additional patient barriers to care. Pt identified no additional social barriers to care. Per pt and pt children, pt is not in need of resources at this time.    The following were addressed during this visit:  -Complete follow-up with patient    PEPITO David

## 2022-08-31 NOTE — PLAN OF CARE
ISRAEL contacted Beatris Richey (Daughter) [phone] to discuss dc planning. SW expressed that pt will not dc today. Pts daughter expressed understanding. SW will continue to follow pt throughout her transitions of care and assist with any dc needs.        08/31/22 1431   Post-Acute Status   Post-Acute Authorization Other

## 2022-08-31 NOTE — PROGRESS NOTES
Pharmacokinetic Initial Assessment: IV Vancomycin    Assessment/Plan:    Initiate intravenous vancomycin with loading dose of 1750 mg once followed by a maintenance dose of vancomycin 1250mg IV every 24 hours  Desired empiric serum trough concentration is 15 to 20 mcg/mL  Draw vancomycin trough level 60 min prior to third dose on 9-2 at approximately 17:00    Pharmacy will continue to follow and monitor vancomycin.      Please contact pharmacy at extension [phone] with any questions regarding this assessment.     Thank you for the consult,   Tone Vázquez       Patient brief summary:  Annette Garcia is a 86 y.o. female initiated on antimicrobial therapy with IV Vancomycin for treatment of suspected bacteremia    Drug Allergies:   Review of patient's allergies indicates:   Allergen Reactions    Adhesive Itching and Blisters    Penicillins Anaphylaxis    Tramadol Hives    Avelox [moxifloxacin] Rash     Facial and arm itching and redness. Pt states throat closes when given.    Amoxil [amoxicillin]     Aspridrox [aspirin, buffered]     Codeine Other (See Comments)     Throat swelling    Keflex [cephalexin]      Tolerated cefepime and cefazolin    Norvasc [amlodipine]     Red dye Hives    Robitussin [guaifenesin]     Sulfa (sulfonamide antibiotics)     Tylenol [acetaminophen]      Has reaction to Tylenol with red dye and unable to take Extra Strength Tylenol/ CAN ONLY TOLERATE REG STRENGTH TYLENOL    Vicks vaporub [camphor-eucalyptus oil-menthol]        Actual Body Weight:   73.5 kg    Renal Function:   Estimated Creatinine Clearance: 47.7 mL/min (based on SCr of 0.8 mg/dL).,     Dialysis Method (if applicable):  N/A    CBC (last 72 hours):  Recent Labs   Lab Result Units 08/29/22  2111 08/30/22  0633 08/31/22  0438   WBC K/uL 10.71 9.63 7.90   Hemoglobin g/dL 12.5 11.9* 10.3*   Hematocrit % 38.4 35.6* 31.8*   Platelets K/uL 182 190 149*   Gran % % 73.4* 76.1* 65.8   Lymph % % 16.2* 15.1* 22.0   Mono % % 10.0 8.4 10.1    Eosinophil % % 0.0 0.0 1.4   Basophil % % 0.1 0.2 0.4   Differential Method  Automated Automated Automated       Metabolic Panel (last 72 hours):  Recent Labs   Lab Result Units 08/29/22 1852 08/29/22 2200   Sodium mmol/L  --  139   Potassium mmol/L  --  3.7   Chloride mmol/L  --  97   CO2 mmol/L  --  26   Glucose mg/dL  --  140*   Glucose, UA  Negative  --    BUN mg/dL  --  18   Creatinine mg/dL  --  0.8   Albumin g/dL  --  3.7   Total Bilirubin mg/dL  --  0.5   Alkaline Phosphatase U/L  --  99   AST U/L  --  19   ALT U/L  --  23   Magnesium mg/dL  --  1.8       Drug levels (last 3 results):  No results for input(s): VANCOMYCINRA, VANCORANDOM, VANCOMYCINPE, VANCOPEAK, VANCOMYCINTR, VANCOTROUGH in the last 72 hours.    Microbiologic Results:  Microbiology Results (last 7 days)       Procedure Component Value Units Date/Time    MRSA/SA Rapid ID by PCR from Blood culture [[id number]] Collected: 08/31/22 1702    Order Status: No result Updated: 08/31/22 1703    Blood culture x two cultures. Draw prior to antibiotics. [[id number]] Collected: 08/29/22 1949    Order Status: Completed Specimen: Blood from Peripheral, Hand, Left Updated: 08/31/22 1702     Blood Culture, Routine Gram stain howard bottle: Gram positive cocci in clusters resembling Staph      Results called to and read back by:Katty Fletcher RN 08/31/2022  17:02    Narrative:      Aerobic and anaerobic    Blood culture x two cultures. Draw prior to antibiotics. [[id number]] Collected: 08/29/22 2111    Order Status: Completed Specimen: Blood Updated: 08/31/22 0613     Blood Culture, Routine No Growth to date      No Growth to date    Narrative:      Aerobic and anaerobic    Urine culture [[id number]]  (Abnormal) Collected: 08/29/22 1852    Order Status: Completed Specimen: Urine Updated: 08/30/22 2206     Urine Culture, Routine GRAM NEGATIVE SAMEERA  >100,000 cfu/ml  Identification and susceptibility pending      Narrative:      Specimen Source->Urine

## 2022-08-31 NOTE — ANESTHESIA PREPROCEDURE EVALUATION
08/31/2022  Annette Garcia is a 86 y.o., female who presents for a repair of incision vs ventral hernia.     8/2020 TTE   There is no evidence of intracardiac shunting.   Normal left ventricular systolic function. The estimated ejection fraction is 55%.   Normal LV diastolic function.   The estimated PA systolic pressure is 28 mmHg.   No wall motion abnormalities.   Normal right ventricular systolic function.   Normal central venous pressure (3 mmHg).   Mild mitral regurgitation.      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    In the setting of septic shock and near death    Petit mal epilepsy 1954    Scoliosis of lumbar spine     Seizures     Unspecified hypothyroidism     UTI (urinary tract infection) 05/22/2022       Past Surgical History:   Procedure Laterality Date    APPENDECTOMY      BACK SURGERY  1988    vertebral fracture    BACK SURGERY  02/2013    lumbar L2-5    CATARACT EXTRACTION, BILATERAL Bilateral     CHOLECYSTECTOMY      open    EYE SURGERY Bilateral     cataract removal with lens implant    HYSTERECTOMY      PERCUTANEOUS TRANSLUMINAL ANGIOPLASTY (PTA) OF PERIPHERAL VESSEL N/A 2/3/2020    Procedure: PTA, PERIPHERAL VESSEL;  Surgeon: Timmy Simmons MD;  Location: [facility] CATH LAB/EP;  Service: Cardiology;  Laterality: N/A;    PORTACATH PLACEMENT Right 09/2016    RENAL ARTERY STENT Left 07/19/2017    SIGMOIDECTOMY  10/29/2019    SMALL INTESTINE SURGERY  08/23/2016    THROMBECTOMY N/A 2/3/2020    Procedure: THROMBECTOMY;  Surgeon: Timmy Simmons MD;  Location:  [facility] CATH LAB/EP;  Service: Cardiology;  Laterality: N/A;    TONSILLECTOMY      VAGINAL HYSTERECTOMY W/ ANTERIOR AND POSTERIOR VAGINAL REPAIR           Pre-op Assessment    I have reviewed the Patient Summary Reports.     I have reviewed the Nursing Notes. I have reviewed the NPO Status.      Review of Systems  Anesthesia Hx:  No problems with previous Anesthesia  History of prior surgery of interest to airway management or planning:  Denies Personal Hx of Anesthesia complications.   Social:  Non-Smoker    Hematology/Oncology:         -- Anemia: --  Cancer in past history:  Oncology Comments: Hx of B cell Lymphoma     EENT/Dental:EENT/Dental Normal   Cardiovascular:   Hypertension, well controlled Past MI CAD   ECG has been reviewed.    Pulmonary:  Pulmonary Normal    Renal/:  Renal/ Normal     Hepatic/GI:  Hepatic/GI Normal    Musculoskeletal:   Arthritis  DJD   Neurological:   Seizures, well controlled    Endocrine:   Denies Diabetes. Hypothyroidism  Obesity / BMI > 30      Physical Exam  General: Well nourished, Alert and Oriented    Airway:  Mallampati: III / II  Mouth Opening: Small, but > 3cm  TM Distance: Normal  Tongue: Normal  Neck ROM: Normal ROM    Dental:  Dentures  Full set of top dentures and intact bottom teeth  Abdomen:Colostomy present      Anesthesia Plan  Type of Anesthesia, risks & benefits discussed:    Anesthesia Type: Gen ETT  Intra-op Monitoring Plan: Standard ASA Monitors  Post Op Pain Control Plan: multimodal analgesia and IV/PO Opioids PRN  Induction:  IV  Airway Plan: Direct and Video, Post-Induction  Informed Consent: Informed consent signed with the Patient and all parties understand the risks and agree with anesthesia plan.  All questions answered.   ASA Score: 3  Day of Surgery Review of History & Physical: H&P completed by Anesthesiologist.    Ready For Surgery From Anesthesia Perspective.     .    Lab Results   Component Value Date    WBC 6.81 09/01/2022    HGB 10.6 (L)  09/01/2022    HCT 32.6 (L) 09/01/2022     09/01/2022    CHOL 220 (H) 05/09/2017    TRIG 156 (H) 05/09/2017    HDL 64 05/09/2017    ALT 18 08/31/2022    AST 16 08/31/2022     (L) 09/01/2022    K 3.9 09/01/2022    CL 98 09/01/2022    CREATININE 0.6 09/01/2022    BUN 12 09/01/2022    CO2 25 09/01/2022    TSH 0.507 11/01/2019    INR 1.0 01/31/2020

## 2022-08-31 NOTE — PROGRESS NOTES
[facility] Medicine  Progress Note    Patient Name: Annette Garcia  MRN: [id number]  Patient Class: IP- Inpatient   Admission Date: 8/29/2022  Length of Stay: 1 days  Attending Physician: Tyler Rocha, *  Primary Care Provider: Jose Valles MD        Subjective:     Principal Problem:Small bowel obstruction        HPI:  Annette Garcia is an 86-year-old female with a history of history of CAD, disorder of kidney and ureter, HTN, lone atrial fibrillation, seizures, hypothyroidism who presented to the ED from [facility] for evaluation of recurrent episodes of vomiting as well as nausea today. She has multiple medical comorbidities including ostomy. She presented from [facility] to the ED for evaluation of multiple (she reports 40) episodes of vomiting yesterday. She associated vomiting with nausea, abdominal pain, lack of appetite, and fatigue. She has a noted LLQ colostomy with formed stool in pouch, no blood noted. She says her abdominal pain is currently gone however it is achy and intermittent in nature. She denies chest pain, SOB, fever, chills, changes in bowel or bladder changes, urinary frequency, burning, or odor. Of note she was admitted 5/20/22 for partial bowel obstruction.    In the ED: labs pretty unremarkable. UA with noted UTI. CT abdomen with High-grade small bowel obstruction with a transition point within a left lower quadrant parastomal hernia.  No evidence of bowel wall thickening, pneumatosis or gross perforation. 2. Distal esophageal wall thickening and luminal fluid which may be related to reflux esophagitis.  Rectum further evaluation with EGD to exclude and underlying lesion if not already performed. 3. Tree-in-bud nodularity in the right middle lobe with mild bronchiectasis in the middle lobe and lingula.  Findings are concerning for an atypical infectious process such as MAC.  Mild aspiration not excluded. CXR with No acute cardiopulmonary  abnormality. She was given IVF, pain medication, started on aztreonam for UTI. Admitted to [facility] for further care.      Overview/Hospital Course:  No notes on file    Interval History:  No further nausea or vomiting this morning; reports that abdominal pain is improving after placement of NG tube last night.  No output from ostomy.  Abdomen is soft and only mildly tender today.    Review of Systems   Constitutional:  Negative for fever.   Respiratory:  Negative for shortness of breath.    Gastrointestinal:  Positive for abdominal distention, abdominal pain (Improving) and constipation. Negative for diarrhea, nausea and vomiting.   Neurological:  Positive for weakness.   Objective:     Vital Signs (Most Recent):  Temp: 98.2 °F (36.8 °C) (08/31/22 1224)  Pulse: 85 (08/31/22 1245)  Resp: 18 (08/31/22 1224)  BP: (!) 167/73 (08/31/22 1245)  SpO2: 100 % (08/31/22 1224)   Vital Signs (24h Range):  Temp:  [96.5 °F (35.8 °C)-98.5 °F (36.9 °C)] 98.2 °F (36.8 °C)  Pulse:  [69-85] 85  Resp:  [18] 18  SpO2:  [91 %-100 %] 100 %  BP: (135-218)/(61-89) 167/73     Weight: 73.5 kg (162 lb 0.6 oz)  Body mass index is 33.87 kg/m².    Intake/Output Summary (Last 24 hours) at 8/31/2022 1352  Last data filed at 8/31/2022 0933  Gross per 24 hour   Intake 110 ml   Output 320 ml   Net -210 ml      Physical Exam  Vitals and nursing note reviewed.   Constitutional:       Appearance: Normal appearance. She is obese. She is not ill-appearing, toxic-appearing or diaphoretic.   HENT:      Head: Normocephalic and atraumatic.      Nose:      Comments: NG tube in place     Mouth/Throat:      Mouth: Mucous membranes are dry.   Eyes:      Pupils: Pupils are equal, round, and reactive to light.   Cardiovascular:      Rate and Rhythm: Normal rate and regular rhythm.      Pulses: Normal pulses.      Heart sounds: Normal heart sounds.   Pulmonary:      Effort: Pulmonary effort is normal. No respiratory distress.      Breath sounds:  Normal breath sounds.   Abdominal:      General: The ostomy site is clean. There is distension (mild).      Palpations: Abdomen is soft.      Tenderness: There is no abdominal tenderness.      Comments: Left colostomy noted without stool   Musculoskeletal:         General: No swelling. Normal range of motion.      Cervical back: Normal range of motion.   Skin:     General: Skin is warm.      Capillary Refill: Capillary refill takes 2 to 3 seconds.      Comments: Skin irritation noted around ostomy site   Neurological:      General: No focal deficit present.      Mental Status: She is alert and oriented to person, place, and time. Mental status is at baseline.   Psychiatric:         Mood and Affect: Mood normal.         Behavior: Behavior normal.       Significant Labs: All pertinent labs within the past 24 hours have been reviewed.    Significant Imaging: I have reviewed all pertinent imaging results/findings within the past 24 hours.      Assessment/Plan:      * Small bowel obstruction  -c/o N/V and abdominal pain  -CT abdomen with High-grade small bowel obstruction with a transition point within a left lower quadrant parastomal hernia. No evidence of bowel wall thickening, pneumatosis or gross perforation.  -mild distention and tender to palpation around colostomy noted. Colostomy with brown stool in pouch  -NPO  -NGT to LIWS  -IVF; IV antibiotics  -consult general surgery, plan for OR tomorrow    UTI (urinary tract infection)  -UA positive  -On aztreonam     Colostomy in place  -Noted  -medium amount formed brown stool noted at admission, none today  -site intact with irritation around--consult wound care      Chronic anemia  -stable  -monitor      Coronary artery disease  -Resume home statin, BB  -Resume home eliquis after surgery      Paroxysmal atrial fibrillation  Patient with Paroxysmal (<7 days) atrial fibrillation which is controlled currently with Beta Blocker. Patient is currently in sinus  rhythm.[id number] Score: 4. HASBLED Score: Unable to calculate. Anticoagulation indicated. Anticoagulation done with home eliquis.  -general surgery will be planning OR later this week; holding anticoagulation      Acquired hypothyroidism  -continue home synthroid      Epilepsy  -continue home trileptal and phenobarbital        VTE Risk Mitigation (From admission, onward)         Ordered     IP VTE HIGH RISK PATIENT  Once         08/30/22 0120     Place sequential compression device  Until discontinued         08/30/22 0120                Discharge Planning   JESSICA: 9/5/2022     Code Status: Full Code   Is the patient medically ready for discharge?:     Reason for patient still in hospital (select all that apply): Treatment and Consult recommendations  Discharge Plan A: Return to nursing home                  Tyler Rocha MD  Department of Hospital Medicine   [facility]

## 2022-08-31 NOTE — PROGRESS NOTES
[facility]  General Surgery  Progress Note    Subjective:     History of Present Illness:  Ms. 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 She has had a parastomal hernia of the end colostomy with history of partial SBO. For the past couple of days she has had nausea and vomiting for which she presented to the ED. She has not had significant output from her colostomy for 4 days now. She also endorses abdominal pain, worse in the RLQ. No pain endorsed with the parastomal hernia. No fever, chills, chest pain, or shortness of breath.     Workup in  ED revealed a CT scan with high-grade SBO obstruction with transition point within the parastomal hernia. Lab work relatively benign. She takes Eliquis at home, which has been held since admission.      Post-Op Info:  * No surgery found *         Interval History: Ms. Garcia continues to endorse abdominal distension, pain, nausea, and vomiting. No significant ostomy output. AVSS. Lab work stable this morning.     Medications:  Continuous Infusions:   sodium chloride 0.9% 100 mL/hr at 08/31/22 0353     Scheduled Meds:   aztreonam  2,000 mg Intravenous Q8H    levothyroxine  125 mcg Oral Before breakfast    losartan  50 mg Oral Daily    magnesium oxide  800 mg Oral Daily    metoprolol tartrate  25 mg Oral BID    morphine  2 mg Intravenous Once    OXcarbazepine  150 mg Oral Nightly    PHENobarbitaL  97.2 mg Per NG tube Nightly    pravastatin  20 mg Oral Daily    sodium chloride 0.9%  10 mL Intravenous Q8H     PRN Meds:albuterol-ipratropium, dextrose 10%, dextrose 10%, glucagon (human recombinant), glucose, glucose, hydrALAZINE, lorazepam, melatonin, naloxone, ondansetron, polyethylene glycol, prochlorperazine     Review of patient's allergies indicates:   Allergen Reactions    Adhesive Itching and Blisters    Penicillins Anaphylaxis    Tramadol Hives    Avelox [moxifloxacin] Rash     Facial and  arm itching and redness. Pt states throat closes when given.    Amoxil [amoxicillin]     Aspridrox [aspirin, buffered]     Codeine Other (See Comments)     Throat swelling    Keflex [cephalexin]      Tolerated cefepime and cefazolin    Norvasc [amlodipine]     Red dye Hives    Robitussin [guaifenesin]     Sulfa (sulfonamide antibiotics)     Tylenol [acetaminophen]      Has reaction to Tylenol with red dye and unable to take Extra Strength Tylenol/ CAN ONLY TOLERATE REG STRENGTH TYLENOL    Vicks vaporub [camphor-eucalyptus oil-menthol]      Objective:     Vital Signs (Most Recent):  Temp: 98.2 °F (36.8 °C) (08/31/22 1224)  Pulse: 85 (08/31/22 1245)  Resp: 18 (08/31/22 1224)  BP: (!) 167/73 (08/31/22 1245)  SpO2: 100 % (08/31/22 1224)   Vital Signs (24h Range):  Temp:  [96.5 °F (35.8 °C)-98.5 °F (36.9 °C)] 98.2 °F (36.8 °C)  Pulse:  [69-85] 85  Resp:  [18] 18  SpO2:  [91 %-100 %] 100 %  BP: (135-218)/(61-89) 167/73     Weight: 73.5 kg (162 lb 0.6 oz)  Body mass index is 33.87 kg/m².    Intake/Output - Last 3 Shifts         08/29 0700 08/30 0659 08/30 0700 08/31 0659 08/31 0700 09/01 0659    P.O.  0     NG/GT  50 60    Total Intake(mL/kg)  50 (0.7) 60 (0.8)    Urine (mL/kg/hr)  500 (0.3) 320 (0.7)    Stool 0      Total Output 0 500 320    Net 0 -450 -260                   Physical Exam  Vitals reviewed.   Constitutional:       Appearance: Normal appearance.   Cardiovascular:      Rate and Rhythm: Normal rate.   Pulmonary:      Effort: Pulmonary effort is normal.   Abdominal:      General: There is distension.      Palpations: Abdomen is soft.      Tenderness: There is abdominal tenderness.      Comments: Continued distension without significant ostomy output. NGT in place with only 50 mL of output charted since placement   Skin:     General: Skin is warm and dry.   Neurological:      General: No focal deficit present.      Mental Status: She is alert and oriented to person, place, and time.        Significant Labs:  I have reviewed all pertinent lab results within the past 24 hours.  CBC:   Recent Labs   Lab 08/31/22  0438   WBC 7.90   RBC 3.12*   HGB 10.3*   HCT 31.8*   *   *   MCH 33.0*   MCHC 32.4     BMP:   Recent Labs   Lab 08/29/22  2200   *      K 3.7   CL 97   CO2 26   BUN 18   CREATININE 0.8   CALCIUM 9.3   MG 1.8       Significant Diagnostics:  I have reviewed all pertinent imaging results/findings within the past 24 hours.    Assessment/Plan:     * Small bowel obstruction  87 yo female admitted with high-grade SBO associated with parastomal hernia of end colostomy. No significant NGT or ostomy output. We will repeat her CT scan today to see if there are any changes in her obstruction. Will likely need operative intervention with open parastomal hernia repair, potentially tomorrow as she will have been off Eliquis for more than 48 hours.    - Follow up CT scan  - Continue NG tube to LIWS for decompression  - Hold Eliquis  - Monitor for ostomy output  - Surgery to follow        Keith Villalobos MD  General Surgery  [place] - Telemetry

## 2022-08-31 NOTE — ASSESSMENT & PLAN NOTE
Patient with Paroxysmal (<7 days) atrial fibrillation which is controlled currently with Beta Blocker. Patient is currently in sinus rhythm.[id number] Score: 4. HASBLED Score: Unable to calculate. Anticoagulation indicated. Anticoagulation done with home eliquis.  -general surgery will be planning OR later this week; holding anticoagulation

## 2022-08-31 NOTE — SUBJECTIVE & OBJECTIVE
Interval History: Ms. Garcia continues to endorse abdominal distension, pain, nausea, and vomiting. No significant ostomy output. AVSS. Lab work stable this morning.     Medications:  Continuous Infusions:   sodium chloride 0.9% 100 mL/hr at 08/31/22 0353     Scheduled Meds:   aztreonam  2,000 mg Intravenous Q8H    levothyroxine  125 mcg Oral Before breakfast    losartan  50 mg Oral Daily    magnesium oxide  800 mg Oral Daily    metoprolol tartrate  25 mg Oral BID    morphine  2 mg Intravenous Once    OXcarbazepine  150 mg Oral Nightly    PHENobarbitaL  97.2 mg Per NG tube Nightly    pravastatin  20 mg Oral Daily    sodium chloride 0.9%  10 mL Intravenous Q8H     PRN Meds:albuterol-ipratropium, dextrose 10%, dextrose 10%, glucagon (human recombinant), glucose, glucose, hydrALAZINE, lorazepam, melatonin, naloxone, ondansetron, polyethylene glycol, prochlorperazine     Review of patient's allergies indicates:   Allergen Reactions    Adhesive Itching and Blisters    Penicillins Anaphylaxis    Tramadol Hives    Avelox [moxifloxacin] Rash     Facial and arm itching and redness. Pt states throat closes when given.    Amoxil [amoxicillin]     Aspridrox [aspirin, buffered]     Codeine Other (See Comments)     Throat swelling    Keflex [cephalexin]      Tolerated cefepime and cefazolin    Norvasc [amlodipine]     Red dye Hives    Robitussin [guaifenesin]     Sulfa (sulfonamide antibiotics)     Tylenol [acetaminophen]      Has reaction to Tylenol with red dye and unable to take Extra Strength Tylenol/ CAN ONLY TOLERATE REG STRENGTH TYLENOL    Vicks vaporub [camphor-eucalyptus oil-menthol]      Objective:     Vital Signs (Most Recent):  Temp: 98.2 °F (36.8 °C) (08/31/22 1224)  Pulse: 85 (08/31/22 1245)  Resp: 18 (08/31/22 1224)  BP: (!) 167/73 (08/31/22 1245)  SpO2: 100 % (08/31/22 1224)   Vital Signs (24h Range):  Temp:  [96.5 °F (35.8 °C)-98.5 °F (36.9 °C)] 98.2 °F (36.8 °C)  Pulse:  [69-85] 85  Resp:  [18] 18  SpO2:  [91  %-100 %] 100 %  BP: (135-218)/(61-89) 167/73     Weight: 73.5 kg (162 lb 0.6 oz)  Body mass index is 33.87 kg/m².    Intake/Output - Last 3 Shifts         08/29 0700 08/30 0659 08/30 0700 08/31 0659 08/31 0700 09/01 0659    P.O.  0     NG/GT  50 60    Total Intake(mL/kg)  50 (0.7) 60 (0.8)    Urine (mL/kg/hr)  500 (0.3) 320 (0.7)    Stool 0      Total Output 0 500 320    Net 0 -450 -260                   Physical Exam  Vitals reviewed.   Constitutional:       Appearance: Normal appearance.   Cardiovascular:      Rate and Rhythm: Normal rate.   Pulmonary:      Effort: Pulmonary effort is normal.   Abdominal:      General: There is distension.      Palpations: Abdomen is soft.      Tenderness: There is abdominal tenderness.      Comments: Continued distension without significant ostomy output. NGT in place with only 50 mL of output charted since placement   Skin:     General: Skin is warm and dry.   Neurological:      General: No focal deficit present.      Mental Status: She is alert and oriented to person, place, and time.       Significant Labs:  I have reviewed all pertinent lab results within the past 24 hours.  CBC:   Recent Labs   Lab 08/31/22  0438   WBC 7.90   RBC 3.12*   HGB 10.3*   HCT 31.8*   *   *   MCH 33.0*   MCHC 32.4     BMP:   Recent Labs   Lab 08/29/22  2200   *      K 3.7   CL 97   CO2 26   BUN 18   CREATININE 0.8   CALCIUM 9.3   MG 1.8       Significant Diagnostics:  I have reviewed all pertinent imaging results/findings within the past 24 hours.

## 2022-08-31 NOTE — NURSING
Shift assessment complete. Pt is alert and oriented x 4. NG tube to left nare and LWS. Ostomy area clean, dry, intact. Denies pain and verbalizes no needs/complaints at this time. Bed in lowest position, locked, and side rails up x 3. Bed alarm on. Call light in reach.

## 2022-08-31 NOTE — SUBJECTIVE & OBJECTIVE
Interval History:  No further nausea or vomiting this morning; reports that abdominal pain is improving after placement of NG tube last night.  No output from ostomy.  Abdomen is soft and only mildly tender today.    Review of Systems   Constitutional:  Negative for fever.   Respiratory:  Negative for shortness of breath.    Gastrointestinal:  Positive for abdominal distention, abdominal pain (Improving) and constipation. Negative for diarrhea, nausea and vomiting.   Neurological:  Positive for weakness.   Objective:     Vital Signs (Most Recent):  Temp: 98.2 °F (36.8 °C) (08/31/22 1224)  Pulse: 85 (08/31/22 1245)  Resp: 18 (08/31/22 1224)  BP: (!) 167/73 (08/31/22 1245)  SpO2: 100 % (08/31/22 1224)   Vital Signs (24h Range):  Temp:  [96.5 °F (35.8 °C)-98.5 °F (36.9 °C)] 98.2 °F (36.8 °C)  Pulse:  [69-85] 85  Resp:  [18] 18  SpO2:  [91 %-100 %] 100 %  BP: (135-218)/(61-89) 167/73     Weight: 73.5 kg (162 lb 0.6 oz)  Body mass index is 33.87 kg/m².    Intake/Output Summary (Last 24 hours) at 8/31/2022 1352  Last data filed at 8/31/2022 0933  Gross per 24 hour   Intake 110 ml   Output 320 ml   Net -210 ml      Physical Exam  Vitals and nursing note reviewed.   Constitutional:       Appearance: Normal appearance. She is obese. She is not ill-appearing, toxic-appearing or diaphoretic.   HENT:      Head: Normocephalic and atraumatic.      Nose:      Comments: NG tube in place     Mouth/Throat:      Mouth: Mucous membranes are dry.   Eyes:      Pupils: Pupils are equal, round, and reactive to light.   Cardiovascular:      Rate and Rhythm: Normal rate and regular rhythm.      Pulses: Normal pulses.      Heart sounds: Normal heart sounds.   Pulmonary:      Effort: Pulmonary effort is normal. No respiratory distress.      Breath sounds: Normal breath sounds.   Abdominal:      General: The ostomy site is clean. There is distension (mild).      Palpations: Abdomen is soft.      Tenderness: There is no abdominal tenderness.       Comments: Left colostomy noted without stool   Musculoskeletal:         General: No swelling. Normal range of motion.      Cervical back: Normal range of motion.   Skin:     General: Skin is warm.      Capillary Refill: Capillary refill takes 2 to 3 seconds.      Comments: Skin irritation noted around ostomy site   Neurological:      General: No focal deficit present.      Mental Status: She is alert and oriented to person, place, and time. Mental status is at baseline.   Psychiatric:         Mood and Affect: Mood normal.         Behavior: Behavior normal.       Significant Labs: All pertinent labs within the past 24 hours have been reviewed.    Significant Imaging: I have reviewed all pertinent imaging results/findings within the past 24 hours.

## 2022-08-31 NOTE — ASSESSMENT & PLAN NOTE
-Noted  -medium amount formed brown stool noted at admission, none today  -site intact with irritation around--consult wound care

## 2022-08-31 NOTE — ASSESSMENT & PLAN NOTE
87 yo female admitted with high-grade SBO associated with parastomal hernia of end colostomy. No significant NGT or ostomy output. We will repeat her CT scan today to see if there are any changes in her obstruction. Will likely need operative intervention with open parastomal hernia repair, potentially tomorrow as she will have been off Eliquis for more than 48 hours.    - Follow up CT scan  - Continue NG tube to LIWS for decompression  - Hold Eliquis  - Monitor for ostomy output  - Surgery to follow

## 2022-08-31 NOTE — ASSESSMENT & PLAN NOTE
-c/o N/V and abdominal pain  -CT abdomen with High-grade small bowel obstruction with a transition point within a left lower quadrant parastomal hernia. No evidence of bowel wall thickening, pneumatosis or gross perforation.  -mild distention and tender to palpation around colostomy noted. Colostomy with brown stool in pouch  -NPO  -NGT to LIWS  -IVF; IV antibiotics  -consult general surgery, plan for OR tomorrow

## 2022-09-01 ENCOUNTER — ANESTHESIA (OUTPATIENT)
Dept: SURGERY | Facility: HOSPITAL | Age: 87
DRG: 354 | End: 2022-09-01
Payer: MEDICARE

## 2022-09-01 LAB
ANION GAP SERPL CALC-SCNC: 10 MMOL/L (ref 8–16)
BASOPHILS # BLD AUTO: 0.02 K/UL (ref 0–0.2)
BASOPHILS NFR BLD: 0.3 % (ref 0–1.9)
BUN SERPL-MCNC: 12 MG/DL (ref 8–23)
CALCIUM SERPL-MCNC: 7.9 MG/DL (ref 8.7–10.5)
CHLORIDE SERPL-SCNC: 98 MMOL/L (ref 95–110)
CO2 SERPL-SCNC: 25 MMOL/L (ref 23–29)
CREAT SERPL-MCNC: 0.6 MG/DL (ref 0.5–1.4)
DIFFERENTIAL METHOD: ABNORMAL
EOSINOPHIL # BLD AUTO: 0.1 K/UL (ref 0–0.5)
EOSINOPHIL NFR BLD: 2.1 % (ref 0–8)
ERYTHROCYTE [DISTWIDTH] IN BLOOD BY AUTOMATED COUNT: 13.6 % (ref 11.5–14.5)
EST. GFR  (NO RACE VARIABLE): >60 ML/MIN/1.73 M^2
GLUCOSE SERPL-MCNC: 104 MG/DL (ref 70–110)
HCT VFR BLD AUTO: 32.6 % (ref 37–48.5)
HGB BLD-MCNC: 10.6 G/DL (ref 12–16)
IMM GRANULOCYTES # BLD AUTO: 0.02 K/UL (ref 0–0.04)
IMM GRANULOCYTES NFR BLD AUTO: 0.3 % (ref 0–0.5)
LYMPHOCYTES # BLD AUTO: 1.5 K/UL (ref 1–4.8)
LYMPHOCYTES NFR BLD: 21.9 % (ref 18–48)
MAGNESIUM SERPL-MCNC: 1.6 MG/DL (ref 1.6–2.6)
MCH RBC QN AUTO: 33 PG (ref 27–31)
MCHC RBC AUTO-ENTMCNC: 32.5 G/DL (ref 32–36)
MCV RBC AUTO: 102 FL (ref 82–98)
MONOCYTES # BLD AUTO: 0.7 K/UL (ref 0.3–1)
MONOCYTES NFR BLD: 10.6 % (ref 4–15)
NEUTROPHILS # BLD AUTO: 4.4 K/UL (ref 1.8–7.7)
NEUTROPHILS NFR BLD: 64.8 % (ref 38–73)
NRBC BLD-RTO: 0 /100 WBC
OXCARBAZEPINE METABOLITE: <1 MCG/ML (ref 10–35)
PLATELET # BLD AUTO: 154 K/UL (ref 150–450)
PMV BLD AUTO: 9.5 FL (ref 9.2–12.9)
POTASSIUM SERPL-SCNC: 3.9 MMOL/L (ref 3.5–5.1)
RBC # BLD AUTO: 3.21 M/UL (ref 4–5.4)
SODIUM SERPL-SCNC: 133 MMOL/L (ref 136–145)
WBC # BLD AUTO: 6.81 K/UL (ref 3.9–12.7)

## 2022-09-01 PROCEDURE — 99900035 HC TECH TIME PER 15 MIN (STAT)

## 2022-09-01 PROCEDURE — 63600175 PHARM REV CODE 636 W HCPCS: Performed by: STUDENT IN AN ORGANIZED HEALTH CARE EDUCATION/TRAINING PROGRAM

## 2022-09-01 PROCEDURE — 25000003 PHARM REV CODE 250: Performed by: HOSPITALIST

## 2022-09-01 PROCEDURE — 25000003 PHARM REV CODE 250: Performed by: NURSE PRACTITIONER

## 2022-09-01 PROCEDURE — 25000003 PHARM REV CODE 250: Performed by: STUDENT IN AN ORGANIZED HEALTH CARE EDUCATION/TRAINING PROGRAM

## 2022-09-01 PROCEDURE — A4216 STERILE WATER/SALINE, 10 ML: HCPCS | Performed by: STUDENT IN AN ORGANIZED HEALTH CARE EDUCATION/TRAINING PROGRAM

## 2022-09-01 PROCEDURE — 80048 BASIC METABOLIC PNL TOTAL CA: CPT | Performed by: HOSPITALIST

## 2022-09-01 PROCEDURE — 11000001 HC ACUTE MED/SURG PRIVATE ROOM

## 2022-09-01 PROCEDURE — 85025 COMPLETE CBC W/AUTO DIFF WBC: CPT | Performed by: HOSPITALIST

## 2022-09-01 PROCEDURE — 25000003 PHARM REV CODE 250: Performed by: NURSE ANESTHETIST, CERTIFIED REGISTERED

## 2022-09-01 PROCEDURE — 36000707: Performed by: STUDENT IN AN ORGANIZED HEALTH CARE EDUCATION/TRAINING PROGRAM

## 2022-09-01 PROCEDURE — 63600175 PHARM REV CODE 636 W HCPCS: Performed by: FAMILY MEDICINE

## 2022-09-01 PROCEDURE — 49561 PR REPAIR INCISIONAL HERNIA,STRANG: CPT | Mod: ,,, | Performed by: STUDENT IN AN ORGANIZED HEALTH CARE EDUCATION/TRAINING PROGRAM

## 2022-09-01 PROCEDURE — 99232 SBSQ HOSP IP/OBS MODERATE 35: CPT | Mod: ,,, | Performed by: STUDENT IN AN ORGANIZED HEALTH CARE EDUCATION/TRAINING PROGRAM

## 2022-09-01 PROCEDURE — 94761 N-INVAS EAR/PLS OXIMETRY MLT: CPT

## 2022-09-01 PROCEDURE — 36415 COLL VENOUS BLD VENIPUNCTURE: CPT | Performed by: HOSPITALIST

## 2022-09-01 PROCEDURE — 49561 PR REPAIR INCISIONAL HERNIA,STRANG: ICD-10-PCS | Mod: ,,, | Performed by: STUDENT IN AN ORGANIZED HEALTH CARE EDUCATION/TRAINING PROGRAM

## 2022-09-01 PROCEDURE — 71000039 HC RECOVERY, EACH ADD'L HOUR: Performed by: STUDENT IN AN ORGANIZED HEALTH CARE EDUCATION/TRAINING PROGRAM

## 2022-09-01 PROCEDURE — 63600175 PHARM REV CODE 636 W HCPCS: Performed by: NURSE PRACTITIONER

## 2022-09-01 PROCEDURE — S0073 INJECTION, AZTREONAM, 500 MG: HCPCS | Performed by: HOSPITALIST

## 2022-09-01 PROCEDURE — 37000009 HC ANESTHESIA EA ADD 15 MINS: Performed by: STUDENT IN AN ORGANIZED HEALTH CARE EDUCATION/TRAINING PROGRAM

## 2022-09-01 PROCEDURE — A4216 STERILE WATER/SALINE, 10 ML: HCPCS | Performed by: NURSE PRACTITIONER

## 2022-09-01 PROCEDURE — 63600175 PHARM REV CODE 636 W HCPCS: Performed by: NURSE ANESTHETIST, CERTIFIED REGISTERED

## 2022-09-01 PROCEDURE — 99232 PR SUBSEQUENT HOSPITAL CARE,LEVL II: ICD-10-PCS | Mod: ,,, | Performed by: STUDENT IN AN ORGANIZED HEALTH CARE EDUCATION/TRAINING PROGRAM

## 2022-09-01 PROCEDURE — 36000706: Performed by: STUDENT IN AN ORGANIZED HEALTH CARE EDUCATION/TRAINING PROGRAM

## 2022-09-01 PROCEDURE — 71000033 HC RECOVERY, INTIAL HOUR: Performed by: STUDENT IN AN ORGANIZED HEALTH CARE EDUCATION/TRAINING PROGRAM

## 2022-09-01 PROCEDURE — 37000008 HC ANESTHESIA 1ST 15 MINUTES: Performed by: STUDENT IN AN ORGANIZED HEALTH CARE EDUCATION/TRAINING PROGRAM

## 2022-09-01 PROCEDURE — 83735 ASSAY OF MAGNESIUM: CPT | Performed by: HOSPITALIST

## 2022-09-01 RX ORDER — HYDRALAZINE HYDROCHLORIDE 20 MG/ML
10 INJECTION INTRAMUSCULAR; INTRAVENOUS EVERY 8 HOURS
Status: DISCONTINUED | OUTPATIENT
Start: 2022-09-01 | End: 2022-09-05

## 2022-09-01 RX ORDER — SODIUM CHLORIDE 0.9 % (FLUSH) 0.9 %
10 SYRINGE (ML) INJECTION
Status: DISCONTINUED | OUTPATIENT
Start: 2022-09-01 | End: 2022-09-06 | Stop reason: HOSPADM

## 2022-09-01 RX ORDER — SODIUM CHLORIDE 9 MG/ML
INJECTION, SOLUTION INTRAVENOUS CONTINUOUS
Status: DISCONTINUED | OUTPATIENT
Start: 2022-09-01 | End: 2022-09-06

## 2022-09-01 RX ORDER — CEFEPIME HYDROCHLORIDE 1 G/50ML
2 INJECTION, SOLUTION INTRAVENOUS
Status: DISCONTINUED | OUTPATIENT
Start: 2022-09-01 | End: 2022-09-06 | Stop reason: HOSPADM

## 2022-09-01 RX ORDER — ROCURONIUM BROMIDE 10 MG/ML
INJECTION, SOLUTION INTRAVENOUS
Status: DISCONTINUED | OUTPATIENT
Start: 2022-09-01 | End: 2022-09-01

## 2022-09-01 RX ORDER — OXYCODONE HYDROCHLORIDE 5 MG/1
5 TABLET ORAL
Status: DISCONTINUED | OUTPATIENT
Start: 2022-09-01 | End: 2022-09-06 | Stop reason: HOSPADM

## 2022-09-01 RX ORDER — LIDOCAINE HYDROCHLORIDE 20 MG/ML
INJECTION INTRAVENOUS
Status: DISCONTINUED | OUTPATIENT
Start: 2022-09-01 | End: 2022-09-01

## 2022-09-01 RX ORDER — PROPOFOL 10 MG/ML
VIAL (ML) INTRAVENOUS
Status: DISCONTINUED | OUTPATIENT
Start: 2022-09-01 | End: 2022-09-01

## 2022-09-01 RX ORDER — PROCHLORPERAZINE EDISYLATE 5 MG/ML
5 INJECTION INTRAMUSCULAR; INTRAVENOUS EVERY 30 MIN PRN
Status: DISCONTINUED | OUTPATIENT
Start: 2022-09-01 | End: 2022-09-06 | Stop reason: HOSPADM

## 2022-09-01 RX ORDER — FENTANYL CITRATE 50 UG/ML
INJECTION, SOLUTION INTRAMUSCULAR; INTRAVENOUS
Status: DISCONTINUED | OUTPATIENT
Start: 2022-09-01 | End: 2022-09-01

## 2022-09-01 RX ORDER — SUCCINYLCHOLINE CHLORIDE 20 MG/ML
INJECTION INTRAMUSCULAR; INTRAVENOUS
Status: DISCONTINUED | OUTPATIENT
Start: 2022-09-01 | End: 2022-09-01

## 2022-09-01 RX ORDER — BUPIVACAINE HYDROCHLORIDE 2.5 MG/ML
INJECTION, SOLUTION EPIDURAL; INFILTRATION; INTRACAUDAL
Status: DISCONTINUED | OUTPATIENT
Start: 2022-09-01 | End: 2022-09-01 | Stop reason: HOSPADM

## 2022-09-01 RX ORDER — DEXAMETHASONE SODIUM PHOSPHATE 4 MG/ML
INJECTION, SOLUTION INTRA-ARTICULAR; INTRALESIONAL; INTRAMUSCULAR; INTRAVENOUS; SOFT TISSUE
Status: DISCONTINUED | OUTPATIENT
Start: 2022-09-01 | End: 2022-09-01

## 2022-09-01 RX ORDER — HYDROMORPHONE HYDROCHLORIDE 2 MG/ML
0.2 INJECTION, SOLUTION INTRAMUSCULAR; INTRAVENOUS; SUBCUTANEOUS EVERY 5 MIN PRN
Status: DISCONTINUED | OUTPATIENT
Start: 2022-09-01 | End: 2022-09-06 | Stop reason: HOSPADM

## 2022-09-01 RX ADMIN — ONDANSETRON 4 MG: 2 INJECTION INTRAMUSCULAR; INTRAVENOUS at 11:09

## 2022-09-01 RX ADMIN — Medication 800 MG: at 09:09

## 2022-09-01 RX ADMIN — LOSARTAN POTASSIUM 50 MG: 50 TABLET, FILM COATED ORAL at 09:09

## 2022-09-01 RX ADMIN — OXCARBAZEPINE 150 MG: 150 TABLET, FILM COATED ORAL at 08:09

## 2022-09-01 RX ADMIN — PROPOFOL 120 MG: 10 INJECTION, EMULSION INTRAVENOUS at 02:09

## 2022-09-01 RX ADMIN — DEXAMETHASONE SODIUM PHOSPHATE 8 MG: 4 INJECTION, SOLUTION INTRA-ARTICULAR; INTRALESIONAL; INTRAMUSCULAR; INTRAVENOUS; SOFT TISSUE at 03:09

## 2022-09-01 RX ADMIN — HYDROMORPHONE HYDROCHLORIDE 0.2 MG: 2 INJECTION, SOLUTION INTRAMUSCULAR; INTRAVENOUS; SUBCUTANEOUS at 05:09

## 2022-09-01 RX ADMIN — SODIUM CHLORIDE: 0.9 INJECTION, SOLUTION INTRAVENOUS at 02:09

## 2022-09-01 RX ADMIN — HYDRALAZINE HYDROCHLORIDE 10 MG: 20 INJECTION, SOLUTION INTRAMUSCULAR; INTRAVENOUS at 09:09

## 2022-09-01 RX ADMIN — Medication 10 ML: at 05:09

## 2022-09-01 RX ADMIN — ACETAMINOPHEN 650 MG: 325 TABLET, FILM COATED ORAL at 05:09

## 2022-09-01 RX ADMIN — PHENOBARBITAL 97.2 MG: 32.4 TABLET ORAL at 08:09

## 2022-09-01 RX ADMIN — FENTANYL CITRATE 25 MCG: 50 INJECTION, SOLUTION INTRAMUSCULAR; INTRAVENOUS at 02:09

## 2022-09-01 RX ADMIN — SUCCINYLCHOLINE CHLORIDE 120 MG: 20 INJECTION, SOLUTION INTRAMUSCULAR; INTRAVENOUS at 02:09

## 2022-09-01 RX ADMIN — AZTREONAM 2000 MG: 2 INJECTION, POWDER, LYOPHILIZED, FOR SOLUTION INTRAMUSCULAR; INTRAVENOUS at 02:09

## 2022-09-01 RX ADMIN — LIDOCAINE HYDROCHLORIDE 100 MG: 20 INJECTION, SOLUTION INTRAVENOUS at 02:09

## 2022-09-01 RX ADMIN — AZTREONAM 2000 MG: 2 INJECTION, POWDER, LYOPHILIZED, FOR SOLUTION INTRAMUSCULAR; INTRAVENOUS at 09:09

## 2022-09-01 RX ADMIN — Medication 10 ML: at 09:09

## 2022-09-01 RX ADMIN — HYDRALAZINE HYDROCHLORIDE 10 MG: 20 INJECTION, SOLUTION INTRAMUSCULAR; INTRAVENOUS at 10:09

## 2022-09-01 RX ADMIN — HYDRALAZINE HYDROCHLORIDE 10 MG: 20 INJECTION, SOLUTION INTRAMUSCULAR; INTRAVENOUS at 04:09

## 2022-09-01 RX ADMIN — CEFEPIME 2 G: 2 INJECTION, POWDER, FOR SOLUTION INTRAVENOUS at 08:09

## 2022-09-01 RX ADMIN — LEVOTHYROXINE SODIUM 125 MCG: 75 TABLET ORAL at 05:09

## 2022-09-01 RX ADMIN — PRAVASTATIN SODIUM 20 MG: 10 TABLET ORAL at 09:09

## 2022-09-01 RX ADMIN — CEFEPIME 2 G: 2 INJECTION, POWDER, FOR SOLUTION INTRAVENOUS at 12:09

## 2022-09-01 RX ADMIN — SODIUM CHLORIDE: 0.9 INJECTION, SOLUTION INTRAVENOUS at 05:09

## 2022-09-01 RX ADMIN — METOPROLOL TARTRATE 25 MG: 25 TABLET, FILM COATED ORAL at 09:09

## 2022-09-01 RX ADMIN — METOPROLOL TARTRATE 25 MG: 25 TABLET, FILM COATED ORAL at 08:09

## 2022-09-01 RX ADMIN — ROCURONIUM BROMIDE 5 MG: 10 INJECTION, SOLUTION INTRAVENOUS at 02:09

## 2022-09-01 NOTE — ASSESSMENT & PLAN NOTE
-c/o N/V and abdominal pain  -CT abdomen with High-grade small bowel obstruction with a transition point within a left lower quadrant parastomal hernia. No evidence of bowel wall thickening, pneumatosis or gross perforation.  -mild distention and tender to palpation around colostomy noted. Colostomy with brown stool in pouch  -NPO  -NGT to LIWS  -IVF; IV antibiotics  -consult general surgery, plan for OR today 9/1 for local repair, lap repair with mesh and colostomy reversal

## 2022-09-01 NOTE — SUBJECTIVE & OBJECTIVE
Interval History:awake and alert, no new complaint, patient is not passing gas denies abdominal pain, NG tube in place and draining greenish a fluent  Family by bedside, questions and concerns addressed  Appreciate surgery:  Will take to OR today for local repair, lap repair with mesh and colostomy reversal  Elevated blood pressure:  Status schedule IV hydralazine until able to tolerate p.o.    Review of Systems   Constitutional:  Negative for fever.   Respiratory:  Negative for shortness of breath.    Gastrointestinal:  Positive for constipation. Negative for abdominal distention, abdominal pain, diarrhea, nausea and vomiting.   Neurological:  Positive for weakness.   Objective:     Vital Signs (Most Recent):  Temp: 98.8 °F (37.1 °C) (09/01/22 0757)  Pulse: 78 (09/01/22 0757)  Resp: 14 (09/01/22 0757)  BP: (!) 183/79 (09/01/22 0757)  SpO2: (!) 93 % (09/01/22 0757)   Vital Signs (24h Range):  Temp:  [97.4 °F (36.3 °C)-98.8 °F (37.1 °C)] 98.8 °F (37.1 °C)  Pulse:  [] 78  Resp:  [14-18] 14  SpO2:  [92 %-100 %] 93 %  BP: (136-218)/(61-89) 183/79     Weight: 73.5 kg (162 lb 0.6 oz)  Body mass index is 33.87 kg/m².    Intake/Output Summary (Last 24 hours) at 9/1/2022 0919  Last data filed at 9/1/2022 0800  Gross per 24 hour   Intake 90 ml   Output 800 ml   Net -710 ml      Physical Exam  Vitals and nursing note reviewed.   Constitutional:       Appearance: Normal appearance. She is obese. She is not ill-appearing, toxic-appearing or diaphoretic.   HENT:      Head: Normocephalic and atraumatic.      Nose:      Comments: NG tube in place     Mouth/Throat:      Mouth: Mucous membranes are dry.   Eyes:      Pupils: Pupils are equal, round, and reactive to light.   Cardiovascular:      Rate and Rhythm: Normal rate and regular rhythm.      Pulses: Normal pulses.      Heart sounds: Normal heart sounds.   Pulmonary:      Effort: Pulmonary effort is normal. No respiratory distress.      Breath sounds: Normal breath sounds.    Abdominal:      General: The ostomy site is clean. There is distension (mild).      Palpations: Abdomen is soft.      Tenderness: There is no abdominal tenderness.      Comments: Left colostomy noted without stool  Bowel sounds absent   Musculoskeletal:         General: No swelling. Normal range of motion.      Cervical back: Normal range of motion.   Skin:     General: Skin is warm.      Capillary Refill: Capillary refill takes 2 to 3 seconds.      Comments: Skin irritation noted around ostomy site   Neurological:      General: No focal deficit present.      Mental Status: She is alert and oriented to person, place, and time. Mental status is at baseline.   Psychiatric:         Mood and Affect: Mood normal.         Behavior: Behavior normal.       Significant Labs: ABGs: No results for input(s): PH, PCO2, HCO3, POCSATURATED, BE, TOTALHB, COHB, METHB, O2HB, POCFIO2, PO2 in the last 48 hours.  Blood Culture: No results for input(s): LABBLOO in the last 48 hours.  CBC:   Recent Labs   Lab 08/31/22  0438 08/31/22 2035 09/01/22  0630   WBC 7.90 8.68 6.81   HGB 10.3* 11.5* 10.6*   HCT 31.8* 34.0* 32.6*   * 177 154     CMP:   Recent Labs   Lab 08/31/22 2035 09/01/22  0630   * 133*   K 3.0* 3.9   CL 93* 98   CO2 24 25   * 104   BUN 15 12   CREATININE 0.7 0.6   CALCIUM 8.5* 7.9*   PROT 6.3  --    ALBUMIN 3.3*  --    BILITOT 0.4  --    ALKPHOS 78  --    AST 16  --    ALT 18  --    ANIONGAP 15 10     Lactic Acid: No results for input(s): LACTATE in the last 48 hours.  Lipase: No results for input(s): LIPASE in the last 48 hours.  Lipid Panel: No results for input(s): CHOL, HDL, LDLCALC, TRIG, CHOLHDL in the last 48 hours.  Magnesium:   Recent Labs   Lab 09/01/22  0630   MG 1.6     Troponin: No results for input(s): TROPONINI in the last 48 hours.  TSH: No results for input(s): TSH in the last 4320 hours.  Urine Culture: No results for input(s): LABURIN in the last 48 hours.  Urine Studies: No results  for input(s): COLORU, APPEARANCEUA, PHUR, SPECGRAV, PROTEINUA, GLUCUA, KETONESU, BILIRUBINUA, OCCULTUA, NITRITE, UROBILINOGEN, LEUKOCYTESUR, RBCUA, WBCUA, BACTERIA, SQUAMEPITHEL, HYALINECASTS in the last 48 hours.    Invalid input(s): GEMMA    Significant Imaging: I have reviewed all pertinent imaging results/findings within the past 24 hours.

## 2022-09-01 NOTE — TRANSFER OF CARE
"Anesthesia Transfer of Care Note    Patient: Annette Garcia    Procedure(s) Performed: Procedure(s) (LRB):  REPAIR, HERNIA, INCISIONAL OR VENTRAL, WITHOUT HISTORY OF PRIOR REPAIR (N/A)    Patient location: PACU    Anesthesia Type: general    Transport from OR: Transported from OR on 6-10 L/min O2 by face mask with adequate spontaneous ventilation    Post pain: adequate analgesia    Post assessment: no apparent anesthetic complications    Post vital signs: stable    Level of consciousness: awake, alert and oriented    Nausea/Vomiting: no nausea/vomiting    Complications: none    Transfer of care protocol was followed      Last vitals:   Visit Vitals  BP (!) 163/70 (Patient Position: Lying)   Pulse 79   Temp 36.4 °C (97.5 °F) (Oral)   Resp 15   Ht 4' 10" (1.473 m)   Wt 73.5 kg (162 lb 0.6 oz)   LMP  (LMP Unknown)   SpO2 (!) 93%   Breastfeeding No   BMI 33.87 kg/m²     "

## 2022-09-01 NOTE — ANESTHESIA PROCEDURE NOTES
Intubation    Date/Time: 9/1/2022 2:59 PM  Performed by: Brandee Frank CRNA  Authorized by: Uzair Priest MD     Intubation:     Induction:  Intravenous    Intubated:  Postinduction    Mask Ventilation:  Easy mask    Attempts:  1    Attempted By:  CRNA    Method of Intubation:  Video laryngoscopy    Blade:  [place] 3    Laryngeal View Grade: Grade I - full view of cords      Difficult Airway Encountered?: No      Complications:  None    Airway Device:  Oral endotracheal tube    Airway Device Size:  7.0    Style/Cuff Inflation:  Cuffed (inflated to minimal occlusive pressure)    Inflation Amount (mL):  6    Tube secured:  21    Secured at:  The lips    Placement Verified By:  Capnometry    Complicating Factors:  None    Findings Post-Intubation:  BS equal bilateral

## 2022-09-01 NOTE — OP NOTE
[facility]  General Surgery  Operative Note    SUMMARY     Date of Procedure: 9/1/2022     Procedure: Primary repair of paracolostomy hernia    Surgeon(s) and Role:     * Chris Johnson MD - Primary    Assisting Surgeon: None    Pre-Operative Diagnosis: Small bowel obstruction [K56.609]  Parastomal hernia with obstruction and without gangrene [K43.3]    Post-Operative Diagnosis: Post-Op Diagnosis Codes:     * Small bowel obstruction [K56.609]     * Parastomal hernia with obstruction and without gangrene [K43.3]    Anesthesia: General    Operative Findings (including complications, if any):   Loop of normal healthy small bowel and omentum herniated at the medial aspect of the colostomy  Easily reduced and closed primarily using 0 PDS    Description of Technical Procedures:   Placed supine and given GETA. The medial aspect of the colostomy was incised using a 15 blade. Blunt dissection was used to dissect down to the level of the fascia. There was a hernia sac noted containing a loop of small bowel and omentum. IT was healthy appearing and easily reduced back into the abdomen. The fascial defect was somewhat enlarged to about 3cm. Superiorly and inferiorly to the colostomy the fascia was gently reapproximated using 0 PDS in a figure of eight fashion. This did not appear to be under tension. The medial aspect of the defect was then sutured to the medial wall of the colon un a running fashion using PDS 0. The medial colostomy was rematured using 0 vicryl interrupted sutures.     Significant Surgical Tasks Conducted by the Assistant(s), if Applicable:     Estimated Blood Loss (EBL): 5ml           Implants: * No implants in log *    Specimens:   Specimen (24h ago, onward)      None                    Condition: Stable    Disposition: PACU - hemodynamically stable.    Attestation: I was present and scrubbed for the entire procedure.

## 2022-09-01 NOTE — PROGRESS NOTES
[facility] - Telemetry  General Surgery  Progress Note    Subjective:     Interval History:   Feeling ok, some ostomy output after contrast  NG in place with greenish output    Post-Op Info:  Procedure(s) (LRB):  REPAIR, HERNIA, INCISIONAL OR VENTRAL, WITHOUT HISTORY OF PRIOR REPAIR (N/A)          Medications:  Continuous Infusions:   sodium chloride 0.9% 100 mL/hr at 08/31/22 1733     Scheduled Meds:   aztreonam  2,000 mg Intravenous Q8H    levothyroxine  125 mcg Oral Before breakfast    losartan  50 mg Oral Daily    magnesium oxide  800 mg Oral Daily    metoprolol tartrate  25 mg Oral BID    morphine  2 mg Intravenous Once    OXcarbazepine  150 mg Oral Nightly    PHENobarbitaL  97.2 mg Per NG tube Nightly    pravastatin  20 mg Oral Daily    sodium chloride 0.9%  250 mL Intravenous Once    sodium chloride 0.9%  10 mL Intravenous Q8H    vancomycin (VANCOCIN) IVPB  1,250 mg Intravenous Q24H     PRN Meds:acetaminophen, albuterol-ipratropium, dextrose 10%, dextrose 10%, glucagon (human recombinant), glucose, glucose, hydrALAZINE, lorazepam, melatonin, naloxone, ondansetron, polyethylene glycol, prochlorperazine, Pharmacy to dose Vancomycin consult **AND** vancomycin - pharmacy to dose     Objective:     Vital Signs (Most Recent):  Temp: 98.8 °F (37.1 °C) (09/01/22 0757)  Pulse: 78 (09/01/22 0757)  Resp: 14 (09/01/22 0757)  BP: (!) 183/79 (09/01/22 0757)  SpO2: (!) 93 % (09/01/22 0757)   Vital Signs (24h Range):  Temp:  [97.4 °F (36.3 °C)-98.8 °F (37.1 °C)] 98.8 °F (37.1 °C)  Pulse:  [] 78  Resp:  [14-18] 14  SpO2:  [92 %-100 %] 93 %  BP: (136-218)/(61-89) 183/79       Intake/Output Summary (Last 24 hours) at 9/1/2022 0901  Last data filed at 9/1/2022 0800  Gross per 24 hour   Intake 90 ml   Output 800 ml   Net -710 ml       Physical Exam  Ostomy pink, brown stool    Significant Labs:      Significant Diagnostics:  CT scan reviewed    Assessment/Plan:     Active Diagnoses:    Diagnosis Date Noted POA    PRINCIPAL  PROBLEM:  Small bowel obstruction [K56.609] 08/30/2022 Yes    UTI (urinary tract infection) [N39.0] 05/22/2022 Yes    Colostomy in place [Z93.3] 09/14/2020 Not Applicable    Coronary artery disease [I25.10] 08/14/2020 Yes    Chronic anemia [D64.9] 08/14/2020 Yes    Paroxysmal atrial fibrillation [I48.0] 12/28/2019 Yes     Chronic    Acquired hypothyroidism [E03.9] 08/18/2016 Yes     Chronic    Epilepsy [G40.909] 08/18/2016 Yes     Chronic      Problems Resolved During this Admission:     86F with parastomal hernia  Discussed options including obs, local primary repair, lap repair with mesh and colostomy reversal  Anticoags held  on aztreonam for UTI  Plan for local repair today in OR. Discussed with family. Would accept higher reucrrence rate to avoid longer riskier operation, avoid complciataion with mesh or anastomosis but I think she will continue to have problems if we tried observation only. Hernia similar appearance on CT scan, some contrast passes but likely source of partial obstruction.        Chris Johnson MD  General Surgery  [facility] - Telemetry

## 2022-09-01 NOTE — PROGRESS NOTES
[facility] Medicine  Progress Note    Patient Name: Annette Garcia  MRN: [id number]  Patient Class: IP- Inpatient   Admission Date: 8/29/2022  Length of Stay: 2 days  Attending Physician: Alice Barker*  Primary Care Provider: Jose Valles MD        Subjective:     Principal Problem:Small bowel obstruction        HPI:  Annette Garcia is an 86-year-old female with a history of history of CAD, disorder of kidney and ureter, HTN, lone atrial fibrillation, seizures, hypothyroidism who presented to the ED from [facility] for evaluation of recurrent episodes of vomiting as well as nausea today. She has multiple medical comorbidities including ostomy. She presented from [facility] to the ED for evaluation of multiple (she reports 40) episodes of vomiting yesterday. She associated vomiting with nausea, abdominal pain, lack of appetite, and fatigue. She has a noted LLQ colostomy with formed stool in pouch, no blood noted. She says her abdominal pain is currently gone however it is achy and intermittent in nature. She denies chest pain, SOB, fever, chills, changes in bowel or bladder changes, urinary frequency, burning, or odor. Of note she was admitted 5/20/22 for partial bowel obstruction.    In the ED: labs pretty unremarkable. UA with noted UTI. CT abdomen with High-grade small bowel obstruction with a transition point within a left lower quadrant parastomal hernia.  No evidence of bowel wall thickening, pneumatosis or gross perforation. 2. Distal esophageal wall thickening and luminal fluid which may be related to reflux esophagitis.  Rectum further evaluation with EGD to exclude and underlying lesion if not already performed. 3. Tree-in-bud nodularity in the right middle lobe with mild bronchiectasis in the middle lobe and lingula.  Findings are concerning for an atypical infectious process such as MAC.  Mild aspiration not excluded. CXR with No acute cardiopulmonary  abnormality. She was given IVF, pain medication, started on aztreonam for UTI. Admitted to [facility] for further care.      Overview/Hospital Course:  No notes on file    Interval History:awake and alert, no new complaint, patient is not passing gas denies abdominal pain, NG tube in place and draining greenish a fluent  Family by bedside, questions and concerns addressed  Appreciate surgery:  Will take to OR today for local repair, lap repair with mesh and colostomy reversal  Elevated blood pressure:  Status schedule IV hydralazine until able to tolerate p.o.    Review of Systems   Constitutional:  Negative for fever.   Respiratory:  Negative for shortness of breath.    Gastrointestinal:  Positive for constipation. Negative for abdominal distention, abdominal pain, diarrhea, nausea and vomiting.   Neurological:  Positive for weakness.   Objective:     Vital Signs (Most Recent):  Temp: 98.8 °F (37.1 °C) (09/01/22 0757)  Pulse: 78 (09/01/22 0757)  Resp: 14 (09/01/22 0757)  BP: (!) 183/79 (09/01/22 0757)  SpO2: (!) 93 % (09/01/22 0757)   Vital Signs (24h Range):  Temp:  [97.4 °F (36.3 °C)-98.8 °F (37.1 °C)] 98.8 °F (37.1 °C)  Pulse:  [] 78  Resp:  [14-18] 14  SpO2:  [92 %-100 %] 93 %  BP: (136-218)/(61-89) 183/79     Weight: 73.5 kg (162 lb 0.6 oz)  Body mass index is 33.87 kg/m².    Intake/Output Summary (Last 24 hours) at 9/1/2022 0919  Last data filed at 9/1/2022 0800  Gross per 24 hour   Intake 90 ml   Output 800 ml   Net -710 ml      Physical Exam  Vitals and nursing note reviewed.   Constitutional:       Appearance: Normal appearance. She is obese. She is not ill-appearing, toxic-appearing or diaphoretic.   HENT:      Head: Normocephalic and atraumatic.      Nose:      Comments: NG tube in place     Mouth/Throat:      Mouth: Mucous membranes are dry.   Eyes:      Pupils: Pupils are equal, round, and reactive to light.   Cardiovascular:      Rate and Rhythm: Normal rate and regular rhythm.       Pulses: Normal pulses.      Heart sounds: Normal heart sounds.   Pulmonary:      Effort: Pulmonary effort is normal. No respiratory distress.      Breath sounds: Normal breath sounds.   Abdominal:      General: The ostomy site is clean. There is distension (mild).      Palpations: Abdomen is soft.      Tenderness: There is no abdominal tenderness.      Comments: Left colostomy noted without stool  Bowel sounds absent   Musculoskeletal:         General: No swelling. Normal range of motion.      Cervical back: Normal range of motion.   Skin:     General: Skin is warm.      Capillary Refill: Capillary refill takes 2 to 3 seconds.      Comments: Skin irritation noted around ostomy site   Neurological:      General: No focal deficit present.      Mental Status: She is alert and oriented to person, place, and time. Mental status is at baseline.   Psychiatric:         Mood and Affect: Mood normal.         Behavior: Behavior normal.       Significant Labs: ABGs: No results for input(s): PH, PCO2, HCO3, POCSATURATED, BE, TOTALHB, COHB, METHB, O2HB, POCFIO2, PO2 in the last 48 hours.  Blood Culture: No results for input(s): LABBLOO in the last 48 hours.  CBC:   Recent Labs   Lab 08/31/22  0438 08/31/22 2035 09/01/22  0630   WBC 7.90 8.68 6.81   HGB 10.3* 11.5* 10.6*   HCT 31.8* 34.0* 32.6*   * 177 154     CMP:   Recent Labs   Lab 08/31/22 2035 09/01/22  0630   * 133*   K 3.0* 3.9   CL 93* 98   CO2 24 25   * 104   BUN 15 12   CREATININE 0.7 0.6   CALCIUM 8.5* 7.9*   PROT 6.3  --    ALBUMIN 3.3*  --    BILITOT 0.4  --    ALKPHOS 78  --    AST 16  --    ALT 18  --    ANIONGAP 15 10     Lactic Acid: No results for input(s): LACTATE in the last 48 hours.  Lipase: No results for input(s): LIPASE in the last 48 hours.  Lipid Panel: No results for input(s): CHOL, HDL, LDLCALC, TRIG, CHOLHDL in the last 48 hours.  Magnesium:   Recent Labs   Lab 09/01/22  0630   MG 1.6     Troponin: No results for input(s):  TROPONINI in the last 48 hours.  TSH: No results for input(s): TSH in the last 4320 hours.  Urine Culture: No results for input(s): LABURIN in the last 48 hours.  Urine Studies: No results for input(s): COLORU, APPEARANCEUA, PHUR, SPECGRAV, PROTEINUA, GLUCUA, KETONESU, BILIRUBINUA, OCCULTUA, NITRITE, UROBILINOGEN, LEUKOCYTESUR, RBCUA, WBCUA, BACTERIA, SQUAMEPITHEL, HYALINECASTS in the last 48 hours.    Invalid input(s): WRIGHTSUR    Significant Imaging: I have reviewed all pertinent imaging results/findings within the past 24 hours.      Assessment/Plan:      * Small bowel obstruction  -c/o N/V and abdominal pain  -CT abdomen with High-grade small bowel obstruction with a transition point within a left lower quadrant parastomal hernia. No evidence of bowel wall thickening, pneumatosis or gross perforation.  -mild distention and tender to palpation around colostomy noted. Colostomy with brown stool in pouch  -NPO  -NGT to LIWS  -IVF; IV antibiotics  -consult general surgery, plan for OR today 9/1 for local repair, lap repair with mesh and colostomy reversal    UTI (urinary tract infection)  -UA positive  -On aztreonam     Colostomy in place  -Noted  -medium amount formed brown stool noted at admission, none today  -site intact with irritation around--consult wound care      Chronic anemia  -stable  -monitor      Coronary artery disease  -Resume home statin, BB  -Resume home eliquis after surgery      Paroxysmal atrial fibrillation  Patient with Paroxysmal (<7 days) atrial fibrillation which is controlled currently with Beta Blocker. Patient is currently in sinus rhythm.[id number] Score: 4. HASBLED Score: Unable to calculate. Anticoagulation indicated. Anticoagulation done with home eliquis.  -general surgery will be planning OR later this week; holding anticoagulation      Hypertension  Starts schedule IV hydralazine until patient able to tolerate p.o.      Acquired hypothyroidism  -continue home  synthroid      Epilepsy  -continue home trileptal and phenobarbital        VTE Risk Mitigation (From admission, onward)         Ordered     IP VTE HIGH RISK PATIENT  Once         08/30/22 0120     Place sequential compression device  Until discontinued         08/30/22 0120                Discharge Planning   JESSICA: 9/5/2022     Code Status: Full Code   Is the patient medically ready for discharge?:     Reason for patient still in hospital (select all that apply): Patient trending condition  Discharge Plan A: Return to nursing home                  Alice Barker MD  Department of [facility] Medicine   [facility]

## 2022-09-02 LAB
BACTERIA BLD CULT: ABNORMAL
VANCOMYCIN TROUGH SERPL-MCNC: 3 UG/ML (ref 10–22)

## 2022-09-02 PROCEDURE — 25000003 PHARM REV CODE 250: Performed by: NURSE PRACTITIONER

## 2022-09-02 PROCEDURE — 94761 N-INVAS EAR/PLS OXIMETRY MLT: CPT

## 2022-09-02 PROCEDURE — A4216 STERILE WATER/SALINE, 10 ML: HCPCS | Performed by: STUDENT IN AN ORGANIZED HEALTH CARE EDUCATION/TRAINING PROGRAM

## 2022-09-02 PROCEDURE — 63600175 PHARM REV CODE 636 W HCPCS: Performed by: STUDENT IN AN ORGANIZED HEALTH CARE EDUCATION/TRAINING PROGRAM

## 2022-09-02 PROCEDURE — 11000001 HC ACUTE MED/SURG PRIVATE ROOM

## 2022-09-02 PROCEDURE — 25000003 PHARM REV CODE 250: Performed by: STUDENT IN AN ORGANIZED HEALTH CARE EDUCATION/TRAINING PROGRAM

## 2022-09-02 PROCEDURE — 36415 COLL VENOUS BLD VENIPUNCTURE: CPT | Performed by: STUDENT IN AN ORGANIZED HEALTH CARE EDUCATION/TRAINING PROGRAM

## 2022-09-02 PROCEDURE — 99900035 HC TECH TIME PER 15 MIN (STAT)

## 2022-09-02 PROCEDURE — 80202 ASSAY OF VANCOMYCIN: CPT | Performed by: STUDENT IN AN ORGANIZED HEALTH CARE EDUCATION/TRAINING PROGRAM

## 2022-09-02 PROCEDURE — 63600175 PHARM REV CODE 636 W HCPCS: Performed by: NURSE PRACTITIONER

## 2022-09-02 RX ADMIN — PHENOBARBITAL 97.2 MG: 32.4 TABLET ORAL at 09:09

## 2022-09-02 RX ADMIN — METOPROLOL TARTRATE 25 MG: 25 TABLET, FILM COATED ORAL at 09:09

## 2022-09-02 RX ADMIN — HYDRALAZINE HYDROCHLORIDE 10 MG: 20 INJECTION, SOLUTION INTRAMUSCULAR; INTRAVENOUS at 01:09

## 2022-09-02 RX ADMIN — PROCHLORPERAZINE EDISYLATE 5 MG: 5 INJECTION INTRAMUSCULAR; INTRAVENOUS at 01:09

## 2022-09-02 RX ADMIN — SODIUM CHLORIDE: 0.9 INJECTION, SOLUTION INTRAVENOUS at 01:09

## 2022-09-02 RX ADMIN — Medication 800 MG: at 09:09

## 2022-09-02 RX ADMIN — LOSARTAN POTASSIUM 50 MG: 50 TABLET, FILM COATED ORAL at 09:09

## 2022-09-02 RX ADMIN — Medication 10 ML: at 01:09

## 2022-09-02 RX ADMIN — CEFEPIME 2 G: 2 INJECTION, POWDER, FOR SOLUTION INTRAVENOUS at 09:09

## 2022-09-02 RX ADMIN — PRAVASTATIN SODIUM 20 MG: 10 TABLET ORAL at 09:09

## 2022-09-02 RX ADMIN — CEFEPIME 2 G: 2 INJECTION, POWDER, FOR SOLUTION INTRAVENOUS at 12:09

## 2022-09-02 RX ADMIN — HYDRALAZINE HYDROCHLORIDE 10 MG: 20 INJECTION, SOLUTION INTRAMUSCULAR; INTRAVENOUS at 09:09

## 2022-09-02 RX ADMIN — OXCARBAZEPINE 150 MG: 150 TABLET, FILM COATED ORAL at 09:09

## 2022-09-02 RX ADMIN — PROMETHAZINE HYDROCHLORIDE 12.5 MG: 25 INJECTION INTRAMUSCULAR; INTRAVENOUS at 09:09

## 2022-09-02 RX ADMIN — Medication 10 ML: at 10:09

## 2022-09-02 RX ADMIN — CEFEPIME 2 G: 2 INJECTION, POWDER, FOR SOLUTION INTRAVENOUS at 04:09

## 2022-09-02 RX ADMIN — HYDRALAZINE HYDROCHLORIDE 10 MG: 20 INJECTION, SOLUTION INTRAMUSCULAR; INTRAVENOUS at 12:09

## 2022-09-02 RX ADMIN — HYDRALAZINE HYDROCHLORIDE 10 MG: 20 INJECTION, SOLUTION INTRAMUSCULAR; INTRAVENOUS at 06:09

## 2022-09-02 RX ADMIN — LEVOTHYROXINE SODIUM 125 MCG: 75 TABLET ORAL at 06:09

## 2022-09-02 RX ADMIN — Medication 10 ML: at 06:09

## 2022-09-02 NOTE — ANESTHESIA POSTPROCEDURE EVALUATION
Anesthesia Post Evaluation    Patient: Annette Garcia    Procedure(s) Performed: Procedure(s) (LRB):  REPAIR, HERNIA, INCISIONAL OR VENTRAL, WITHOUT HISTORY OF PRIOR REPAIR (N/A)    Final Anesthesia Type: general      Patient location during evaluation: PACU  Patient participation: Yes- Able to Participate  Level of consciousness: awake and alert  Post-procedure vital signs: reviewed and stable  Pain management: adequate  Airway patency: patent    PONV status at discharge: No PONV  Anesthetic complications: no      Cardiovascular status: blood pressure returned to baseline and hemodynamically stable  Respiratory status: unassisted, room air and spontaneous ventilation  Hydration status: euvolemic  Follow-up not needed.          Vitals Value Taken Time   /65 09/02/22 0448   Temp 37.1 °C (98.7 °F) 09/02/22 0448   Pulse 98 09/02/22 0448   Resp 20 09/02/22 0448   SpO2 93 % 09/02/22 0448         Event Time   Out of Recovery 09/01/2022 17:22:01         Pain/Emerald Score: Pain Rating Prior to Med Admin: 4 (9/1/2022  5:18 PM)  Emerald Score: 8 (9/1/2022  5:07 PM)

## 2022-09-02 NOTE — ASSESSMENT & PLAN NOTE
-c/o N/V and abdominal pain  -CT abdomen with High-grade small bowel obstruction with a transition point within a left lower quadrant parastomal hernia. No evidence of bowel wall thickening, pneumatosis or gross perforation.  -mild distention and tender to palpation around colostomy noted. Colostomy with brown stool in pouch  -NPO  -NGT to LIWS  -IVF; IV antibiotics  -consult general surgery- s/p primary parastomal hernia repair for high-grade SBO within her parastomal hernia on 9/1

## 2022-09-02 NOTE — PLAN OF CARE
Pt AAO4. RA. No complaint of pain or signs of distress. Pt given PRN hydralazine, zofran and compazine. Clear liquids. Purewick and colostomy in place. No stool in bag, only blood.  NS infusing at 100mL/hr. Fall precautions maintained. Will continue to monitor.       Problem: Adult Inpatient Plan of Care  Goal: Plan of Care Review  Outcome: Ongoing, Progressing  Goal: Patient-Specific Goal (Individualized)  Outcome: Ongoing, Progressing  Goal: Absence of Hospital-Acquired Illness or Injury  Outcome: Ongoing, Progressing  Goal: Optimal Comfort and Wellbeing  Outcome: Ongoing, Progressing  Goal: Readiness for Transition of Care  Outcome: Ongoing, Progressing     Problem: Fluid Deficit (Intestinal Obstruction)  Goal: Fluid Balance  Outcome: Ongoing, Progressing     Problem: Infection (Intestinal Obstruction)  Goal: Absence of Infection Signs and Symptoms  Outcome: Ongoing, Progressing     Problem: Nausea and Vomiting (Intestinal Obstruction)  Goal: Nausea and Vomiting Relief  Outcome: Ongoing, Progressing     Problem: Pain (Intestinal Obstruction)  Goal: Acceptable Pain Control  Outcome: Ongoing, Progressing     Problem: Balance Impairment (Functional Deficit)  Goal: Improved Balance and Postural Control  Outcome: Ongoing, Progressing     Problem: Muscle Strength Impairment (Functional Deficit)  Goal: Improved Muscle Strength  Outcome: Ongoing, Progressing     Problem: Muscle Tone Impairment (Functional Deficit)  Goal: Improved Muscle Tone  Outcome: Ongoing, Progressing     Problem: Range of Motion Impairment (Functional Deficit)  Goal: Optimal Range of Motion  Outcome: Ongoing, Progressing     Problem: Sensory Impairment (Functional Deficit)  Goal: Compensation for Sensory Deficit  Outcome: Ongoing, Progressing     Problem: Hypertension Comorbidity  Goal: Blood Pressure in Desired Range  Outcome: Ongoing, Progressing     Problem: Seizure Disorder Comorbidity  Goal: Maintenance of Seizure Control  Outcome: Ongoing,  Progressing     Problem: Fall Injury Risk  Goal: Absence of Fall and Fall-Related Injury  Outcome: Ongoing, Progressing     Problem: Skin Injury Risk Increased  Goal: Skin Health and Integrity  Outcome: Ongoing, Progressing

## 2022-09-02 NOTE — SUBJECTIVE & OBJECTIVE
Interval History:awake and alert, s/p primary parastomal hernia repair for high-grade SBO within her parastomal hernia on 9/1  Complained of weakness and nausea, tolerating CLD  Mild blood discharge from ostomy, no gas, no bowel sound too  Appreciates GI rec's       no new complaint, patient is not passing gas denies abdominal pain, NG tube in place and draining greenish a fluent  Family by bedside, questions and concerns addressed  Appreciate surgery:  Will take to OR today for local repair, lap repair with mesh and colostomy reversal  Elevated blood pressure:  Status schedule IV hydralazine until able to tolerate p.o.    Review of Systems   Constitutional:  Negative for fever.   Respiratory:  Negative for shortness of breath.    Gastrointestinal:  Positive for constipation. Negative for abdominal distention, abdominal pain, diarrhea, nausea and vomiting.   Neurological:  Positive for weakness.   Objective:     Vital Signs (Most Recent):  Temp: 99.3 °F (37.4 °C) (09/02/22 0755)  Pulse: 97 (09/02/22 0755)  Resp: 15 (09/02/22 0755)  BP: (!) 154/59 (09/02/22 0755)  SpO2: (!) 93 % (09/02/22 0758)   Vital Signs (24h Range):  Temp:  [97.5 °F (36.4 °C)-99.3 °F (37.4 °C)] 99.3 °F (37.4 °C)  Pulse:  [72-98] 97  Resp:  [12-20] 15  SpO2:  [93 %-100 %] 93 %  BP: (118-214)/(56-83) 154/59     Weight: 73.5 kg (162 lb 0.6 oz)  Body mass index is 33.87 kg/m².    Intake/Output Summary (Last 24 hours) at 9/2/2022 1110  Last data filed at 9/2/2022 0600  Gross per 24 hour   Intake 446.76 ml   Output 300 ml   Net 146.76 ml        Physical Exam  Vitals and nursing note reviewed.   Constitutional:       Appearance: Normal appearance. She is obese. She is not ill-appearing, toxic-appearing or diaphoretic.   HENT:      Head: Normocephalic and atraumatic.      Nose:      Comments: NG tube in place     Mouth/Throat:      Mouth: Mucous membranes are dry.   Eyes:      Pupils: Pupils are equal, round, and reactive to light.   Cardiovascular:       Rate and Rhythm: Normal rate and regular rhythm.      Pulses: Normal pulses.      Heart sounds: Normal heart sounds.   Pulmonary:      Effort: Pulmonary effort is normal. No respiratory distress.      Breath sounds: Normal breath sounds.   Abdominal:      General: The ostomy site is clean. There is no distension.      Palpations: Abdomen is soft.      Tenderness: There is no abdominal tenderness.      Comments: Left colostomy with mild blood drainage, no gas or stool  Bowel sounds absent   Musculoskeletal:         General: No swelling. Normal range of motion.      Cervical back: Normal range of motion.   Skin:     General: Skin is warm.      Capillary Refill: Capillary refill takes 2 to 3 seconds.      Comments: Skin irritation noted around ostomy site   Neurological:      General: No focal deficit present.      Mental Status: She is alert and oriented to person, place, and time. Mental status is at baseline.   Psychiatric:         Mood and Affect: Mood normal.         Behavior: Behavior normal.       Significant Labs: ABGs: No results for input(s): PH, PCO2, HCO3, POCSATURATED, BE, TOTALHB, COHB, METHB, O2HB, POCFIO2, PO2 in the last 48 hours.  Blood Culture: No results for input(s): LABBLOO in the last 48 hours.  CBC:   Recent Labs   Lab 08/31/22 2035 09/01/22  0630   WBC 8.68 6.81   HGB 11.5* 10.6*   HCT 34.0* 32.6*    154       CMP:   Recent Labs   Lab 08/31/22 2035 09/01/22  0630   * 133*   K 3.0* 3.9   CL 93* 98   CO2 24 25   * 104   BUN 15 12   CREATININE 0.7 0.6   CALCIUM 8.5* 7.9*   PROT 6.3  --    ALBUMIN 3.3*  --    BILITOT 0.4  --    ALKPHOS 78  --    AST 16  --    ALT 18  --    ANIONGAP 15 10       Lactic Acid: No results for input(s): LACTATE in the last 48 hours.  Lipase: No results for input(s): LIPASE in the last 48 hours.  Lipid Panel: No results for input(s): CHOL, HDL, LDLCALC, TRIG, CHOLHDL in the last 48 hours.  Magnesium:   Recent Labs   Lab 09/01/22  0630   MG 1.6        Troponin: No results for input(s): TROPONINI in the last 48 hours.  TSH: No results for input(s): TSH in the last 4320 hours.  Urine Culture: No results for input(s): LABURIN in the last 48 hours.  Urine Studies: No results for input(s): COLORU, APPEARANCEUA, PHUR, SPECGRAV, PROTEINUA, GLUCUA, KETONESU, BILIRUBINUA, OCCULTUA, NITRITE, UROBILINOGEN, LEUKOCYTESUR, RBCUA, WBCUA, BACTERIA, SQUAMEPITHEL, HYALINECASTS in the last 48 hours.    Invalid input(s): GEMMA    Significant Imaging: I have reviewed all pertinent imaging results/findings within the past 24 hours.

## 2022-09-02 NOTE — PROGRESS NOTES
[facility]  Progress Note    Patient Name: Annette Garcia  MRN: [id number]  Patient Class: IP- Inpatient   Admission Date: 8/29/2022  Length of Stay: 3 days  Attending Physician: Alice Barker*  Primary Care Provider: Joes Valles MD        Subjective:     Principal Problem:Small bowel obstruction        HPI:  Annette Garcia is an 86-year-old female with a history of history of CAD, disorder of kidney and ureter, HTN, lone atrial fibrillation, seizures, hypothyroidism who presented to the ED from [facility] for evaluation of recurrent episodes of vomiting as well as nausea today. She has multiple medical comorbidities including ostomy. She presented from [facility] to the ED for evaluation of multiple (she reports 40) episodes of vomiting yesterday. She associated vomiting with nausea, abdominal pain, lack of appetite, and fatigue. She has a noted LLQ colostomy with formed stool in pouch, no blood noted. She says her abdominal pain is currently gone however it is achy and intermittent in nature. She denies chest pain, SOB, fever, chills, changes in bowel or bladder changes, urinary frequency, burning, or odor. Of note she was admitted 5/20/22 for partial bowel obstruction.    In the ED: labs pretty unremarkable. UA with noted UTI. CT abdomen with High-grade small bowel obstruction with a transition point within a left lower quadrant parastomal hernia.  No evidence of bowel wall thickening, pneumatosis or gross perforation. 2. Distal esophageal wall thickening and luminal fluid which may be related to reflux esophagitis.  Rectum further evaluation with EGD to exclude and underlying lesion if not already performed. 3. Tree-in-bud nodularity in the right middle lobe with mild bronchiectasis in the middle lobe and lingula.  Findings are concerning for an atypical infectious process such as MAC.  Mild aspiration not excluded. CXR with No acute cardiopulmonary  abnormality. She was given IVF, pain medication, started on aztreonam for UTI. Admitted to [facility] for further care.      Overview/Hospital Course:  No notes on file    Interval History:awake and alert, s/p primary parastomal hernia repair for high-grade SBO within her parastomal hernia on 9/1  Complained of weakness and nausea, tolerating CLD  Mild blood discharge from ostomy, no gas, no bowel sound too  Appreciates GI rec's       no new complaint, patient is not passing gas denies abdominal pain, NG tube in place and draining greenish a fluent  Family by bedside, questions and concerns addressed  Appreciate surgery:  Will take to OR today for local repair, lap repair with mesh and colostomy reversal  Elevated blood pressure:  Status schedule IV hydralazine until able to tolerate p.o.    Review of Systems   Constitutional:  Negative for fever.   Respiratory:  Negative for shortness of breath.    Gastrointestinal:  Positive for constipation. Negative for abdominal distention, abdominal pain, diarrhea, nausea and vomiting.   Neurological:  Positive for weakness.   Objective:     Vital Signs (Most Recent):  Temp: 99.3 °F (37.4 °C) (09/02/22 0755)  Pulse: 97 (09/02/22 0755)  Resp: 15 (09/02/22 0755)  BP: (!) 154/59 (09/02/22 0755)  SpO2: (!) 93 % (09/02/22 0758)   Vital Signs (24h Range):  Temp:  [97.5 °F (36.4 °C)-99.3 °F (37.4 °C)] 99.3 °F (37.4 °C)  Pulse:  [72-98] 97  Resp:  [12-20] 15  SpO2:  [93 %-100 %] 93 %  BP: (118-214)/(56-83) 154/59     Weight: 73.5 kg (162 lb 0.6 oz)  Body mass index is 33.87 kg/m².    Intake/Output Summary (Last 24 hours) at 9/2/2022 1110  Last data filed at 9/2/2022 0600  Gross per 24 hour   Intake 446.76 ml   Output 300 ml   Net 146.76 ml        Physical Exam  Vitals and nursing note reviewed.   Constitutional:       Appearance: Normal appearance. She is obese. She is not ill-appearing, toxic-appearing or diaphoretic.   HENT:      Head: Normocephalic and atraumatic.       Nose:      Comments: NG tube in place     Mouth/Throat:      Mouth: Mucous membranes are dry.   Eyes:      Pupils: Pupils are equal, round, and reactive to light.   Cardiovascular:      Rate and Rhythm: Normal rate and regular rhythm.      Pulses: Normal pulses.      Heart sounds: Normal heart sounds.   Pulmonary:      Effort: Pulmonary effort is normal. No respiratory distress.      Breath sounds: Normal breath sounds.   Abdominal:      General: The ostomy site is clean. There is no distension.      Palpations: Abdomen is soft.      Tenderness: There is no abdominal tenderness.      Comments: Left colostomy with mild blood drainage, no gas or stool  Bowel sounds absent   Musculoskeletal:         General: No swelling. Normal range of motion.      Cervical back: Normal range of motion.   Skin:     General: Skin is warm.      Capillary Refill: Capillary refill takes 2 to 3 seconds.      Comments: Skin irritation noted around ostomy site   Neurological:      General: No focal deficit present.      Mental Status: She is alert and oriented to person, place, and time. Mental status is at baseline.   Psychiatric:         Mood and Affect: Mood normal.         Behavior: Behavior normal.       Significant Labs: ABGs: No results for input(s): PH, PCO2, HCO3, POCSATURATED, BE, TOTALHB, COHB, METHB, O2HB, POCFIO2, PO2 in the last 48 hours.  Blood Culture: No results for input(s): LABBLOO in the last 48 hours.  CBC:   Recent Labs   Lab 08/31/22 2035 09/01/22  0630   WBC 8.68 6.81   HGB 11.5* 10.6*   HCT 34.0* 32.6*    154       CMP:   Recent Labs   Lab 08/31/22 2035 09/01/22  0630   * 133*   K 3.0* 3.9   CL 93* 98   CO2 24 25   * 104   BUN 15 12   CREATININE 0.7 0.6   CALCIUM 8.5* 7.9*   PROT 6.3  --    ALBUMIN 3.3*  --    BILITOT 0.4  --    ALKPHOS 78  --    AST 16  --    ALT 18  --    ANIONGAP 15 10       Lactic Acid: No results for input(s): LACTATE in the last 48 hours.  Lipase: No results for  input(s): LIPASE in the last 48 hours.  Lipid Panel: No results for input(s): CHOL, HDL, LDLCALC, TRIG, CHOLHDL in the last 48 hours.  Magnesium:   Recent Labs   Lab 09/01/22  0630   MG 1.6       Troponin: No results for input(s): TROPONINI in the last 48 hours.  TSH: No results for input(s): TSH in the last 4320 hours.  Urine Culture: No results for input(s): LABURIN in the last 48 hours.  Urine Studies: No results for input(s): COLORU, APPEARANCEUA, PHUR, SPECGRAV, PROTEINUA, GLUCUA, KETONESU, BILIRUBINUA, OCCULTUA, NITRITE, UROBILINOGEN, LEUKOCYTESUR, RBCUA, WBCUA, BACTERIA, SQUAMEPITHEL, HYALINECASTS in the last 48 hours.    Invalid input(s): WRIGHTSUR    Significant Imaging: I have reviewed all pertinent imaging results/findings within the past 24 hours.      Assessment/Plan:      * Small bowel obstruction  -c/o N/V and abdominal pain  -CT abdomen with High-grade small bowel obstruction with a transition point within a left lower quadrant parastomal hernia. No evidence of bowel wall thickening, pneumatosis or gross perforation.  -mild distention and tender to palpation around colostomy noted. Colostomy with brown stool in pouch  -NPO  -NGT to LIWS  -IVF; IV antibiotics  -consult general surgery- s/p primary parastomal hernia repair for high-grade SBO within her parastomal hernia on 9/1      UTI (urinary tract infection)  -UA positive  -On aztreonam     Colostomy in place  -Noted  -medium amount formed brown stool noted at admission, none today  -site intact with irritation around--consult wound care      Chronic anemia  -stable  -monitor      Coronary artery disease  -Resume home statin, BB  -Resume home eliquis after surgery      Paroxysmal atrial fibrillation  Patient with Paroxysmal (<7 days) atrial fibrillation which is controlled currently with Beta Blocker. Patient is currently in sinus rhythm.[id number] Score: 4. HASBLED Score: Unable to calculate. Anticoagulation indicated. Anticoagulation done with  home eliquis.  -general surgery will be planning OR later this week; holding anticoagulation      Hypertension  Starts schedule IV hydralazine until patient able to tolerate p.o.      Acquired hypothyroidism  -continue home synthroid      Epilepsy  -continue home trileptal and phenobarbital        VTE Risk Mitigation (From admission, onward)         Ordered     IP VTE HIGH RISK PATIENT  Once         08/30/22 0120     Place sequential compression device  Until discontinued         08/30/22 0120                Discharge Planning   JESSICA: 9/5/2022     Code Status: Full Code   Is the patient medically ready for discharge?:     Reason for patient still in hospital (select all that apply): Patient trending condition  Discharge Plan A: Return to nursing home                  Alice Barker MD  Department of Hospital Medicine   [place] [facility]

## 2022-09-02 NOTE — PROGRESS NOTES
Progress Note  General Surgery    Admit Date: 8/29/2022  Attending: Syed  S/P: Procedure(s) (LRB):  REPAIR, HERNIA, INCISIONAL OR VENTRAL, WITHOUT HISTORY OF PRIOR REPAIR (N/A)    Post-operative Day: 1 Day Post-Op    Hospital Day: 5    SUBJECTIVE:     Ms. Garcia had nausea and surgical site abdominal pain overnight. Pain and nausea better controlled this morning, with pain rated 4/10. No gas or liquid output in the ostomy appliance.    OBJECTIVE:     Vital Signs (Most Recent)  Temp: 99.3 °F (37.4 °C) (09/02/22 0755)  Pulse: 97 (09/02/22 0755)  Resp: 15 (09/02/22 0755)  BP: (!) 154/59 (09/02/22 0755)  SpO2: (!) 93 % (09/02/22 0758)    Vital Signs Range (Last 24H):  Temp:  [97.5 °F (36.4 °C)-99.3 °F (37.4 °C)]   Pulse:  [72-98]   Resp:  [12-20]   BP: (118-214)/(56-83)   SpO2:  [93 %-100 %]     I & O (Last 24H):  Intake/Output Summary (Last 24 hours) at 9/2/2022 0852  Last data filed at 9/2/2022 0600  Gross per 24 hour   Intake 446.76 ml   Output 300 ml   Net 146.76 ml     Physical Exam:  General: no distress  Lungs:  clear to auscultation bilaterally and normal respiratory effort  Heart: regular rate  Abdomen: expected post-operative tenderness with persistent distension. Ostomy appliance minimal SS fluid, no gas.      Laboratory:  CBC:   Recent Labs   Lab 09/01/22 0630   WBC 6.81   RBC 3.21*   HGB 10.6*   HCT 32.6*      *   MCH 33.0*   MCHC 32.5     BMP:   Recent Labs   Lab 09/01/22 0630      *   K 3.9   CL 98   CO2 25   BUN 12   CREATININE 0.6   CALCIUM 7.9*     Labs within the past 24 hours have been reviewed.    ASSESSMENT/PLAN:     Assessment: s/p primary parastomal hernia repair on 9/1 for high-grade SBO within her parastomal hernia. Awaiting return of bowel function.    Plan: out of bed, ambulate, pain control, encourage frequent activity as tolerated. PRN pain and nausea regimen. Continue sips of clears until return of bowel function .    Keith Villalobos MD

## 2022-09-02 NOTE — PLAN OF CARE
went to meet with patient. No family at bedside. Patient is care home resident at [facility]. I sent updated clinicals to [facility] via [facility]. Per MD team, will likely be here over the weekend. Patient encouraged to call with any questions or concerns.  will continue to follow patient through transitions of care and assist with any discharge needs.       CM received called from patient's daughter Amira. She asked when discharge would be. CM updated her no plans for today. We will call her once ready. All questions answered.     CM spoke with Millie at [facility]. She stated if new medications are started at discharge patient unable to come back over the weekend.    [facility] will need a COVID test prior to discharge back.    Patient Contacts    Name Relation Home Work Mobile   Beatris Richey Daughter  [phone]   Lorenza Richey Daughter [phone]   Gabrielle Ramirez Daughter   [phone]   TroscErnesto cat Son   [phone]   ShitalAshley marley Relative   [phone]     [facility] [phone] [address] [place] [address] Steps: Follow upAppointment: Instructions: Nursing Home    Future Appointments   Date Time Provider Department Center   9/14/2022  2:20 PM Chirs Johnson MD [facility] EM[facility] [place]         09/02/22 1133   Discharge Reassessment   Assessment Type Discharge Planning Reassessment   Did the patient's condition or plan change since previous assessment? No   Discharge Plan discussed with: Patient   Discharge Plan A Return to nursing home   Discharge Plan B Skilled Nursing Facility   DME Needed Upon Discharge  none   Discharge Barriers Identified None   Why the patient remains in the hospital Requires continued medical care   Post-Acute Status   Post-Acute Authorization Placement   Post-Acute Placement Status Pending medical clearance/testing   Hospital  Resources/Appts/Education Provided Appointments scheduled by Navigator/Coordinator   Discharge Delays None known at this time       Yasmine Mccormick RN    [phone]

## 2022-09-03 LAB
ANION GAP SERPL CALC-SCNC: 8 MMOL/L (ref 8–16)
BASOPHILS # BLD AUTO: 0.02 K/UL (ref 0–0.2)
BASOPHILS NFR BLD: 0.3 % (ref 0–1.9)
BUN SERPL-MCNC: 15 MG/DL (ref 8–23)
CALCIUM SERPL-MCNC: 8 MG/DL (ref 8.7–10.5)
CHLORIDE SERPL-SCNC: 96 MMOL/L (ref 95–110)
CO2 SERPL-SCNC: 27 MMOL/L (ref 23–29)
CREAT SERPL-MCNC: 0.6 MG/DL (ref 0.5–1.4)
DIFFERENTIAL METHOD: ABNORMAL
EOSINOPHIL # BLD AUTO: 0.1 K/UL (ref 0–0.5)
EOSINOPHIL NFR BLD: 1.8 % (ref 0–8)
ERYTHROCYTE [DISTWIDTH] IN BLOOD BY AUTOMATED COUNT: 13.9 % (ref 11.5–14.5)
EST. GFR  (NO RACE VARIABLE): >60 ML/MIN/1.73 M^2
GLUCOSE SERPL-MCNC: 115 MG/DL (ref 70–110)
HCT VFR BLD AUTO: 29.9 % (ref 37–48.5)
HGB BLD-MCNC: 10.1 G/DL (ref 12–16)
IMM GRANULOCYTES # BLD AUTO: 0.03 K/UL (ref 0–0.04)
IMM GRANULOCYTES NFR BLD AUTO: 0.5 % (ref 0–0.5)
LYMPHOCYTES # BLD AUTO: 1.3 K/UL (ref 1–4.8)
LYMPHOCYTES NFR BLD: 19.8 % (ref 18–48)
MAGNESIUM SERPL-MCNC: 2 MG/DL (ref 1.6–2.6)
MCH RBC QN AUTO: 32.8 PG (ref 27–31)
MCHC RBC AUTO-ENTMCNC: 33.8 G/DL (ref 32–36)
MCV RBC AUTO: 97 FL (ref 82–98)
MONOCYTES # BLD AUTO: 0.5 K/UL (ref 0.3–1)
MONOCYTES NFR BLD: 7.9 % (ref 4–15)
NEUTROPHILS # BLD AUTO: 4.6 K/UL (ref 1.8–7.7)
NEUTROPHILS NFR BLD: 69.7 % (ref 38–73)
NRBC BLD-RTO: 0 /100 WBC
PLATELET # BLD AUTO: 182 K/UL (ref 150–450)
PMV BLD AUTO: 9.8 FL (ref 9.2–12.9)
POTASSIUM SERPL-SCNC: 3 MMOL/L (ref 3.5–5.1)
RBC # BLD AUTO: 3.08 M/UL (ref 4–5.4)
SODIUM SERPL-SCNC: 131 MMOL/L (ref 136–145)
WBC # BLD AUTO: 6.61 K/UL (ref 3.9–12.7)

## 2022-09-03 PROCEDURE — 25000003 PHARM REV CODE 250: Performed by: STUDENT IN AN ORGANIZED HEALTH CARE EDUCATION/TRAINING PROGRAM

## 2022-09-03 PROCEDURE — 85025 COMPLETE CBC W/AUTO DIFF WBC: CPT | Performed by: STUDENT IN AN ORGANIZED HEALTH CARE EDUCATION/TRAINING PROGRAM

## 2022-09-03 PROCEDURE — 11000001 HC ACUTE MED/SURG PRIVATE ROOM

## 2022-09-03 PROCEDURE — 63600175 PHARM REV CODE 636 W HCPCS: Performed by: STUDENT IN AN ORGANIZED HEALTH CARE EDUCATION/TRAINING PROGRAM

## 2022-09-03 PROCEDURE — 94761 N-INVAS EAR/PLS OXIMETRY MLT: CPT

## 2022-09-03 PROCEDURE — 36415 COLL VENOUS BLD VENIPUNCTURE: CPT | Performed by: STUDENT IN AN ORGANIZED HEALTH CARE EDUCATION/TRAINING PROGRAM

## 2022-09-03 PROCEDURE — 25000003 PHARM REV CODE 250: Performed by: FAMILY MEDICINE

## 2022-09-03 PROCEDURE — 99900035 HC TECH TIME PER 15 MIN (STAT)

## 2022-09-03 PROCEDURE — 63600175 PHARM REV CODE 636 W HCPCS: Performed by: FAMILY MEDICINE

## 2022-09-03 PROCEDURE — 83735 ASSAY OF MAGNESIUM: CPT | Performed by: STUDENT IN AN ORGANIZED HEALTH CARE EDUCATION/TRAINING PROGRAM

## 2022-09-03 PROCEDURE — 80048 BASIC METABOLIC PNL TOTAL CA: CPT | Performed by: STUDENT IN AN ORGANIZED HEALTH CARE EDUCATION/TRAINING PROGRAM

## 2022-09-03 PROCEDURE — A4216 STERILE WATER/SALINE, 10 ML: HCPCS | Performed by: STUDENT IN AN ORGANIZED HEALTH CARE EDUCATION/TRAINING PROGRAM

## 2022-09-03 RX ORDER — POTASSIUM CHLORIDE 7.45 MG/ML
10 INJECTION INTRAVENOUS
Status: DISPENSED | OUTPATIENT
Start: 2022-09-03 | End: 2022-09-03

## 2022-09-03 RX ORDER — BISACODYL 10 MG
10 SUPPOSITORY, RECTAL RECTAL DAILY
Status: DISCONTINUED | OUTPATIENT
Start: 2022-09-03 | End: 2022-09-06 | Stop reason: HOSPADM

## 2022-09-03 RX ORDER — POLYETHYLENE GLYCOL 3350 17 G/17G
17 POWDER, FOR SOLUTION ORAL 2 TIMES DAILY
Status: DISCONTINUED | OUTPATIENT
Start: 2022-09-03 | End: 2022-09-06 | Stop reason: HOSPADM

## 2022-09-03 RX ADMIN — POTASSIUM CHLORIDE 10 MEQ: 7.46 INJECTION, SOLUTION INTRAVENOUS at 11:09

## 2022-09-03 RX ADMIN — HYDRALAZINE HYDROCHLORIDE 10 MG: 20 INJECTION, SOLUTION INTRAMUSCULAR; INTRAVENOUS at 03:09

## 2022-09-03 RX ADMIN — LEVOTHYROXINE SODIUM 125 MCG: 75 TABLET ORAL at 05:09

## 2022-09-03 RX ADMIN — HYDRALAZINE HYDROCHLORIDE 10 MG: 20 INJECTION, SOLUTION INTRAMUSCULAR; INTRAVENOUS at 10:09

## 2022-09-03 RX ADMIN — OXYCODONE HYDROCHLORIDE 5 MG: 5 TABLET ORAL at 03:09

## 2022-09-03 RX ADMIN — Medication 10 ML: at 05:09

## 2022-09-03 RX ADMIN — POTASSIUM CHLORIDE 10 MEQ: 7.46 INJECTION, SOLUTION INTRAVENOUS at 02:09

## 2022-09-03 RX ADMIN — CEFEPIME 2 G: 2 INJECTION, POWDER, FOR SOLUTION INTRAVENOUS at 05:09

## 2022-09-03 RX ADMIN — Medication 800 MG: at 09:09

## 2022-09-03 RX ADMIN — LOSARTAN POTASSIUM 50 MG: 50 TABLET, FILM COATED ORAL at 09:09

## 2022-09-03 RX ADMIN — ACETAMINOPHEN 650 MG: 325 TABLET, FILM COATED ORAL at 03:09

## 2022-09-03 RX ADMIN — CEFEPIME 2 G: 2 INJECTION, POWDER, FOR SOLUTION INTRAVENOUS at 04:09

## 2022-09-03 RX ADMIN — OXCARBAZEPINE 150 MG: 150 TABLET, FILM COATED ORAL at 08:09

## 2022-09-03 RX ADMIN — POTASSIUM CHLORIDE 10 MEQ: 7.46 INJECTION, SOLUTION INTRAVENOUS at 12:09

## 2022-09-03 RX ADMIN — CEFEPIME 2 G: 2 INJECTION, POWDER, FOR SOLUTION INTRAVENOUS at 10:09

## 2022-09-03 RX ADMIN — Medication 10 ML: at 02:09

## 2022-09-03 RX ADMIN — Medication 10 ML: at 10:09

## 2022-09-03 RX ADMIN — METOPROLOL TARTRATE 25 MG: 25 TABLET, FILM COATED ORAL at 08:09

## 2022-09-03 RX ADMIN — METOPROLOL TARTRATE 25 MG: 25 TABLET, FILM COATED ORAL at 09:09

## 2022-09-03 RX ADMIN — POLYETHYLENE GLYCOL 3350 17 G: 17 POWDER, FOR SOLUTION ORAL at 08:09

## 2022-09-03 RX ADMIN — PHENOBARBITAL 97.2 MG: 32.4 TABLET ORAL at 08:09

## 2022-09-03 RX ADMIN — HYDRALAZINE HYDROCHLORIDE 10 MG: 20 INJECTION, SOLUTION INTRAMUSCULAR; INTRAVENOUS at 05:09

## 2022-09-03 RX ADMIN — PRAVASTATIN SODIUM 20 MG: 10 TABLET ORAL at 09:09

## 2022-09-03 NOTE — PROGRESS NOTES
[facility]  General Surgery  Progress Note    Subjective:     Interval History:   Some local soreness  Julianne clears  Denies NV      Post-Op Info:  Procedure(s) (LRB):  REPAIR, HERNIA, INCISIONAL OR VENTRAL, WITHOUT HISTORY OF PRIOR REPAIR (N/A)   2 Days Post-Op      Medications:  Continuous Infusions:   sodium chloride 0.9% 100 mL/hr at 08/31/22 1733    sodium chloride 0.9% 25 mL/hr at 09/02/22 1346     Scheduled Meds:   ceFEPime (MAXIPIME) IVPB  2 g Intravenous Q8H    hydrALAZINE  10 mg Intravenous Q8H    levothyroxine  125 mcg Oral Before breakfast    losartan  50 mg Oral Daily    magnesium oxide  800 mg Oral Daily    metoprolol tartrate  25 mg Oral BID    morphine  2 mg Intravenous Once    OXcarbazepine  150 mg Oral Nightly    PHENobarbitaL  97.2 mg Per NG tube Nightly    potassium chloride  10 mEq Intravenous Q1H    pravastatin  20 mg Oral Daily    sodium chloride 0.9%  250 mL Intravenous Once    sodium chloride 0.9%  10 mL Intravenous Q8H     PRN Meds:acetaminophen, albuterol-ipratropium, dextrose 10%, dextrose 10%, glucagon (human recombinant), glucose, glucose, hydrALAZINE, HYDROmorphone, lorazepam, melatonin, naloxone, ondansetron, oxyCODONE, polyethylene glycol, prochlorperazine, prochlorperazine, sodium chloride 0.9%, sodium chloride 0.9%     Objective:     Vital Signs (Most Recent):  Temp: 97.6 °F (36.4 °C) (09/03/22 0748)  Pulse: 78 (09/03/22 1028)  Resp: 18 (09/03/22 0748)  BP: (!) 144/62 (09/03/22 0748)  SpO2: (!) 93 % (09/03/22 1028)   Vital Signs (24h Range):  Temp:  [97.6 °F (36.4 °C)-99 °F (37.2 °C)] 97.6 °F (36.4 °C)  Pulse:  [78-88] 78  Resp:  [15-20] 18  SpO2:  [91 %-96 %] 93 %  BP: (119-171)/(56-74) 144/62       Intake/Output Summary (Last 24 hours) at 9/3/2022 1107  Last data filed at 9/2/2022 2206  Gross per 24 hour   Intake 300 ml   Output 700 ml   Net -400 ml       Physical Exam  Some gas in bag. Some serosang drainage.     Significant Labs:  CBC:   Recent Labs   Lab 09/03/22  0621    WBC 6.61   RBC 3.08*   HGB 10.1*   HCT 29.9*      MCV 97   MCH 32.8*   MCHC 33.8     CMP:   Recent Labs   Lab 09/03/22  0621   *   CALCIUM 8.0*   *   K 3.0*   CO2 27   CL 96   BUN 15   CREATININE 0.6       Significant Diagnostics:  I have reviewed all pertinent imaging results/findings within the past 24 hours.    Assessment/Plan:     Active Diagnoses:    Diagnosis Date Noted POA    PRINCIPAL PROBLEM:  Small bowel obstruction [K56.609] 08/30/2022 Yes    UTI (urinary tract infection) [N39.0] 05/22/2022 Yes    Colostomy in place [Z93.3] 09/14/2020 Not Applicable    Coronary artery disease [I25.10] 08/14/2020 Yes    Chronic anemia [D64.9] 08/14/2020 Yes    Paroxysmal atrial fibrillation [I48.0] 12/28/2019 Yes     Chronic    Hypertension [I10] 12/05/2016 Yes    Acquired hypothyroidism [E03.9] 08/18/2016 Yes     Chronic    Epilepsy [G40.909] 08/18/2016 Yes     Chronic      Problems Resolved During this Admission:     S/p parastomal hernia repair, primary  Replace k  Still not much ostomy output  Wbc 6  OOB  Advance diet    Chris Johnson MD  General Surgery  [facility] - Telemetry

## 2022-09-03 NOTE — PROGRESS NOTES
[facility]  Progress Note    Patient Name: Annette Garcia  MRN: [id number]  Patient Class: IP- Inpatient   Admission Date: 8/29/2022  Length of Stay: 4 days  Attending Physician: Alice Barker*  Primary Care Provider: Jose Valles MD        Subjective:     Principal Problem:Small bowel obstruction        HPI:  Annette Garcia is an 86-year-old female with a history of history of CAD, disorder of kidney and ureter, HTN, lone atrial fibrillation, seizures, hypothyroidism who presented to the ED from [facility] for evaluation of recurrent episodes of vomiting as well as nausea today. She has multiple medical comorbidities including ostomy. She presented from [facility] to the ED for evaluation of multiple (she reports 40) episodes of vomiting yesterday. She associated vomiting with nausea, abdominal pain, lack of appetite, and fatigue. She has a noted LLQ colostomy with formed stool in pouch, no blood noted. She says her abdominal pain is currently gone however it is achy and intermittent in nature. She denies chest pain, SOB, fever, chills, changes in bowel or bladder changes, urinary frequency, burning, or odor. Of note she was admitted 5/20/22 for partial bowel obstruction.    In the ED: labs pretty unremarkable. UA with noted UTI. CT abdomen with High-grade small bowel obstruction with a transition point within a left lower quadrant parastomal hernia.  No evidence of bowel wall thickening, pneumatosis or gross perforation. 2. Distal esophageal wall thickening and luminal fluid which may be related to reflux esophagitis.  Rectum further evaluation with EGD to exclude and underlying lesion if not already performed. 3. Tree-in-bud nodularity in the right middle lobe with mild bronchiectasis in the middle lobe and lingula.  Findings are concerning for an atypical infectious process such as MAC.  Mild aspiration not excluded. CXR with No acute cardiopulmonary  abnormality. She was given IVF, pain medication, started on aztreonam for UTI. Admitted to [facility] for further care.      Overview/Hospital Course:  No notes on file    Interval History:awake and alert, s/p primary parastomal hernia repair for high-grade SBO within her parastomal hernia on 9/1  No new complaint  Appreciates GI rec's   Elevated blood pressure:  Status schedule IV hydralazine until able to tolerate p.o.    Replace K     Review of Systems   Constitutional:  Negative for fever.   Respiratory:  Negative for shortness of breath.    Gastrointestinal:  Positive for constipation. Negative for abdominal distention, abdominal pain, diarrhea, nausea and vomiting.   Neurological:  Positive for weakness.   Objective:     Vital Signs (Most Recent):  Temp: 97.6 °F (36.4 °C) (09/03/22 0748)  Pulse: 84 (09/03/22 0748)  Resp: 18 (09/03/22 0748)  BP: (!) 144/62 (09/03/22 0748)  SpO2: (!) 94 % (09/03/22 0748)   Vital Signs (24h Range):  Temp:  [97.6 °F (36.4 °C)-99 °F (37.2 °C)] 97.6 °F (36.4 °C)  Pulse:  [78-88] 84  Resp:  [15-20] 18  SpO2:  [91 %-96 %] 94 %  BP: (119-171)/(56-74) 144/62     Weight: 73.5 kg (162 lb 0.6 oz)  Body mass index is 33.87 kg/m².    Intake/Output Summary (Last 24 hours) at 9/3/2022 0923  Last data filed at 9/2/2022 2206  Gross per 24 hour   Intake 480 ml   Output 700 ml   Net -220 ml        Physical Exam  Vitals and nursing note reviewed.   Constitutional:       Appearance: Normal appearance. She is obese. She is not ill-appearing, toxic-appearing or diaphoretic.   HENT:      Head: Normocephalic and atraumatic.      Nose:      Comments: NG tube in place     Mouth/Throat:      Mouth: Mucous membranes are dry.   Eyes:      Pupils: Pupils are equal, round, and reactive to light.   Cardiovascular:      Rate and Rhythm: Normal rate and regular rhythm.      Pulses: Normal pulses.      Heart sounds: Normal heart sounds.   Pulmonary:      Effort: Pulmonary effort is normal. No  respiratory distress.      Breath sounds: Normal breath sounds.   Abdominal:      General: The ostomy site is clean. There is no distension.      Palpations: Abdomen is soft.      Tenderness: There is no abdominal tenderness.      Comments: Left colostomy with mild blood drainage, no gas or stool  Bowel sounds absent   Musculoskeletal:         General: No swelling. Normal range of motion.      Cervical back: Normal range of motion.   Skin:     General: Skin is warm.      Capillary Refill: Capillary refill takes 2 to 3 seconds.      Comments: Skin irritation noted around ostomy site   Neurological:      General: No focal deficit present.      Mental Status: She is alert and oriented to person, place, and time. Mental status is at baseline.   Psychiatric:         Mood and Affect: Mood normal.         Behavior: Behavior normal.       Significant Labs: ABGs: No results for input(s): PH, PCO2, HCO3, POCSATURATED, BE, TOTALHB, COHB, METHB, O2HB, POCFIO2, PO2 in the last 48 hours.  Blood Culture: No results for input(s): LABBLOO in the last 48 hours.  CBC:   Recent Labs   Lab 09/03/22  0621   WBC 6.61   HGB 10.1*   HCT 29.9*          CMP:   Recent Labs   Lab 09/03/22  0621   *   K 3.0*   CL 96   CO2 27   *   BUN 15   CREATININE 0.6   CALCIUM 8.0*   ANIONGAP 8       Lactic Acid: No results for input(s): LACTATE in the last 48 hours.  Lipase: No results for input(s): LIPASE in the last 48 hours.  Lipid Panel: No results for input(s): CHOL, HDL, LDLCALC, TRIG, CHOLHDL in the last 48 hours.  Magnesium:   No results for input(s): MG in the last 48 hours.    Troponin: No results for input(s): TROPONINI in the last 48 hours.  TSH: No results for input(s): TSH in the last 4320 hours.  Urine Culture: No results for input(s): LABURIN in the last 48 hours.  Urine Studies: No results for input(s): COLORU, APPEARANCEUA, PHUR, SPECGRAV, PROTEINUA, GLUCUA, KETONESU, BILIRUBINUA, OCCULTUA, NITRITE, UROBILINOGEN,  LEUKOCYTESUR, RBCUA, WBCUA, BACTERIA, SQUAMEPITHEL, HYALINECASTS in the last 48 hours.    Invalid input(s): GEMMA    Significant Imaging: I have reviewed all pertinent imaging results/findings within the past 24 hours.      Assessment/Plan:      * Small bowel obstruction  -c/o N/V and abdominal pain  -CT abdomen with High-grade small bowel obstruction with a transition point within a left lower quadrant parastomal hernia. No evidence of bowel wall thickening, pneumatosis or gross perforation.  -mild distention and tender to palpation around colostomy noted. Colostomy with brown stool in pouch  -NPO  -NGT to LIWS  -IVF; IV antibiotics  -consult general surgery- s/p primary parastomal hernia repair for high-grade SBO within her parastomal hernia on 9/1      UTI (urinary tract infection)  -UA positive  -On aztreonam     Colostomy in place  -Noted  -medium amount formed brown stool noted at admission, none today  -site intact with irritation around--consult wound care      Chronic anemia  -stable  -monitor      Coronary artery disease  -Resume home statin, BB  -Resume home eliquis after surgery      Paroxysmal atrial fibrillation  Patient with Paroxysmal (<7 days) atrial fibrillation which is controlled currently with Beta Blocker. Patient is currently in sinus rhythm.[id number] Score: 4. HASBLED Score: Unable to calculate. Anticoagulation indicated. Anticoagulation done with home eliquis.  -general surgery will be planning OR later this week; holding anticoagulation      Hypertension  Starts schedule IV hydralazine until patient able to tolerate p.o.      Acquired hypothyroidism  -continue home synthroid      Epilepsy  -continue home trileptal and phenobarbital        VTE Risk Mitigation (From admission, onward)         Ordered     IP VTE HIGH RISK PATIENT  Once         08/30/22 0120     Place sequential compression device  Until discontinued         08/30/22 0120                Discharge Planning   JESSICA: 9/5/2022      Code Status: Full Code   Is the patient medically ready for discharge?:     Reason for patient still in hospital (select all that apply): Patient trending condition  Discharge Plan A: Return to nursing home   Discharge Delays: None known at this time              Alice Barker MD  Department of [facility]   [facility]

## 2022-09-03 NOTE — SUBJECTIVE & OBJECTIVE
Interval History:awake and alert, s/p primary parastomal hernia repair for high-grade SBO within her parastomal hernia on 9/1  No new complaint  Appreciates GI rec's   Elevated blood pressure:  Status schedule IV hydralazine until able to tolerate p.o.    Replace K     Review of Systems   Constitutional:  Negative for fever.   Respiratory:  Negative for shortness of breath.    Gastrointestinal:  Positive for constipation. Negative for abdominal distention, abdominal pain, diarrhea, nausea and vomiting.   Neurological:  Positive for weakness.   Objective:     Vital Signs (Most Recent):  Temp: 97.6 °F (36.4 °C) (09/03/22 0748)  Pulse: 84 (09/03/22 0748)  Resp: 18 (09/03/22 0748)  BP: (!) 144/62 (09/03/22 0748)  SpO2: (!) 94 % (09/03/22 0748)   Vital Signs (24h Range):  Temp:  [97.6 °F (36.4 °C)-99 °F (37.2 °C)] 97.6 °F (36.4 °C)  Pulse:  [78-88] 84  Resp:  [15-20] 18  SpO2:  [91 %-96 %] 94 %  BP: (119-171)/(56-74) 144/62     Weight: 73.5 kg (162 lb 0.6 oz)  Body mass index is 33.87 kg/m².    Intake/Output Summary (Last 24 hours) at 9/3/2022 0923  Last data filed at 9/2/2022 2206  Gross per 24 hour   Intake 480 ml   Output 700 ml   Net -220 ml        Physical Exam  Vitals and nursing note reviewed.   Constitutional:       Appearance: Normal appearance. She is obese. She is not ill-appearing, toxic-appearing or diaphoretic.   HENT:      Head: Normocephalic and atraumatic.      Nose:      Comments: NG tube in place     Mouth/Throat:      Mouth: Mucous membranes are dry.   Eyes:      Pupils: Pupils are equal, round, and reactive to light.   Cardiovascular:      Rate and Rhythm: Normal rate and regular rhythm.      Pulses: Normal pulses.      Heart sounds: Normal heart sounds.   Pulmonary:      Effort: Pulmonary effort is normal. No respiratory distress.      Breath sounds: Normal breath sounds.   Abdominal:      General: The ostomy site is clean. There is no distension.      Palpations: Abdomen is soft.      Tenderness:  There is no abdominal tenderness.      Comments: Left colostomy with mild blood drainage, no gas or stool  Bowel sounds absent   Musculoskeletal:         General: No swelling. Normal range of motion.      Cervical back: Normal range of motion.   Skin:     General: Skin is warm.      Capillary Refill: Capillary refill takes 2 to 3 seconds.      Comments: Skin irritation noted around ostomy site   Neurological:      General: No focal deficit present.      Mental Status: She is alert and oriented to person, place, and time. Mental status is at baseline.   Psychiatric:         Mood and Affect: Mood normal.         Behavior: Behavior normal.       Significant Labs: ABGs: No results for input(s): PH, PCO2, HCO3, POCSATURATED, BE, TOTALHB, COHB, METHB, O2HB, POCFIO2, PO2 in the last 48 hours.  Blood Culture: No results for input(s): LABBLOO in the last 48 hours.  CBC:   Recent Labs   Lab 09/03/22  0621   WBC 6.61   HGB 10.1*   HCT 29.9*          CMP:   Recent Labs   Lab 09/03/22  0621   *   K 3.0*   CL 96   CO2 27   *   BUN 15   CREATININE 0.6   CALCIUM 8.0*   ANIONGAP 8       Lactic Acid: No results for input(s): LACTATE in the last 48 hours.  Lipase: No results for input(s): LIPASE in the last 48 hours.  Lipid Panel: No results for input(s): CHOL, HDL, LDLCALC, TRIG, CHOLHDL in the last 48 hours.  Magnesium:   No results for input(s): MG in the last 48 hours.    Troponin: No results for input(s): TROPONINI in the last 48 hours.  TSH: No results for input(s): TSH in the last 4320 hours.  Urine Culture: No results for input(s): LABURIN in the last 48 hours.  Urine Studies: No results for input(s): COLORU, APPEARANCEUA, PHUR, SPECGRAV, PROTEINUA, GLUCUA, KETONESU, BILIRUBINUA, OCCULTUA, NITRITE, UROBILINOGEN, LEUKOCYTESUR, RBCUA, WBCUA, BACTERIA, SQUAMEPITHEL, HYALINECASTS in the last 48 hours.    Invalid input(s): GEMMA    Significant Imaging: I have reviewed all pertinent imaging results/findings  within the past 24 hours.

## 2022-09-03 NOTE — PT/OT/SLP PROGRESS
Physical Therapy      Patient Name:  Annette Garcia   MRN:  [id number]    Patient not seen today secondary to Other (Comment) (Patient reports just having her first meal since surgery and since throwing up a lot yesterday. Patient requested not to complete PT today after eating. Requesting PT evaluation tomorrow if able/possible.).

## 2022-09-03 NOTE — PLAN OF CARE
Pt AAO4. RA. No complaints of pain or signs of discomfort. No reports of nausea or vomiting. Clear liquids. No stool output in colostomy, bloody drainage only. Fall precautions maintained. Will continue to monitor.     Problem: Adult Inpatient Plan of Care  Goal: Plan of Care Review  Outcome: Ongoing, Progressing  Goal: Patient-Specific Goal (Individualized)  Outcome: Ongoing, Progressing  Goal: Absence of Hospital-Acquired Illness or Injury  Outcome: Ongoing, Progressing  Goal: Optimal Comfort and Wellbeing  Outcome: Ongoing, Progressing  Goal: Readiness for Transition of Care  Outcome: Ongoing, Progressing     Problem: Fluid Deficit (Intestinal Obstruction)  Goal: Fluid Balance  Outcome: Ongoing, Progressing     Problem: Infection (Intestinal Obstruction)  Goal: Absence of Infection Signs and Symptoms  Outcome: Ongoing, Progressing     Problem: Nausea and Vomiting (Intestinal Obstruction)  Goal: Nausea and Vomiting Relief  Outcome: Ongoing, Progressing     Problem: Pain (Intestinal Obstruction)  Goal: Acceptable Pain Control  Outcome: Ongoing, Progressing     Problem: Balance Impairment (Functional Deficit)  Goal: Improved Balance and Postural Control  Outcome: Ongoing, Progressing     Problem: Muscle Strength Impairment (Functional Deficit)  Goal: Improved Muscle Strength  Outcome: Ongoing, Progressing     Problem: Muscle Tone Impairment (Functional Deficit)  Goal: Improved Muscle Tone  Outcome: Ongoing, Progressing     Problem: Range of Motion Impairment (Functional Deficit)  Goal: Optimal Range of Motion  Outcome: Ongoing, Progressing     Problem: Sensory Impairment (Functional Deficit)  Goal: Compensation for Sensory Deficit  Outcome: Ongoing, Progressing     Problem: Hypertension Comorbidity  Goal: Blood Pressure in Desired Range  Outcome: Ongoing, Progressing     Problem: Seizure Disorder Comorbidity  Goal: Maintenance of Seizure Control  Outcome: Ongoing, Progressing     Problem: Fall Injury Risk  Goal:  Absence of Fall and Fall-Related Injury  Outcome: Ongoing, Progressing     Problem: Skin Injury Risk Increased  Goal: Skin Health and Integrity  Outcome: Ongoing, Progressing     Problem: Bleeding (Surgery Nonspecified)  Goal: Absence of Bleeding  Outcome: Ongoing, Progressing     Problem: Bowel Motility Impaired (Surgery Nonspecified)  Goal: Effective Bowel Elimination  Outcome: Ongoing, Progressing     Problem: Fluid and Electrolyte Imbalance (Surgery Nonspecified)  Goal: Fluid and Electrolyte Balance  Outcome: Ongoing, Progressing     Problem: Glycemic Control Impaired (Surgery Nonspecified)  Goal: Blood Glucose Level Within Targeted Range  Outcome: Ongoing, Progressing     Problem: Infection (Surgery Nonspecified)  Goal: Absence of Infection Signs and Symptoms  Outcome: Ongoing, Progressing     Problem: Pain (Surgery Nonspecified)  Goal: Acceptable Pain Control  Outcome: Ongoing, Progressing     Problem: Postoperative Nausea and Vomiting (Surgery Nonspecified)  Goal: Nausea and Vomiting Relief  Outcome: Ongoing, Progressing     Problem: Postoperative Urinary Retention (Surgery Nonspecified)  Goal: Effective Urinary Elimination  Outcome: Ongoing, Progressing     Problem: Respiratory Compromise (Surgery Nonspecified)  Goal: Effective Oxygenation and Ventilation  Outcome: Ongoing, Progressing     Problem: UTI (Urinary Tract Infection)  Goal: Improved Infection Symptoms  Outcome: Ongoing, Progressing

## 2022-09-04 LAB — BACTERIA BLD CULT: NORMAL

## 2022-09-04 PROCEDURE — 25000003 PHARM REV CODE 250: Performed by: STUDENT IN AN ORGANIZED HEALTH CARE EDUCATION/TRAINING PROGRAM

## 2022-09-04 PROCEDURE — A4216 STERILE WATER/SALINE, 10 ML: HCPCS | Performed by: STUDENT IN AN ORGANIZED HEALTH CARE EDUCATION/TRAINING PROGRAM

## 2022-09-04 PROCEDURE — 94761 N-INVAS EAR/PLS OXIMETRY MLT: CPT

## 2022-09-04 PROCEDURE — 63600175 PHARM REV CODE 636 W HCPCS: Performed by: STUDENT IN AN ORGANIZED HEALTH CARE EDUCATION/TRAINING PROGRAM

## 2022-09-04 PROCEDURE — 11000001 HC ACUTE MED/SURG PRIVATE ROOM

## 2022-09-04 PROCEDURE — 97110 THERAPEUTIC EXERCISES: CPT

## 2022-09-04 PROCEDURE — 25000003 PHARM REV CODE 250: Performed by: FAMILY MEDICINE

## 2022-09-04 PROCEDURE — 97161 PT EVAL LOW COMPLEX 20 MIN: CPT

## 2022-09-04 PROCEDURE — 63600175 PHARM REV CODE 636 W HCPCS: Performed by: FAMILY MEDICINE

## 2022-09-04 PROCEDURE — 99900035 HC TECH TIME PER 15 MIN (STAT)

## 2022-09-04 RX ORDER — METOPROLOL TARTRATE 50 MG/1
50 TABLET ORAL 2 TIMES DAILY
Status: DISCONTINUED | OUTPATIENT
Start: 2022-09-04 | End: 2022-09-06 | Stop reason: HOSPADM

## 2022-09-04 RX ORDER — SODIUM CHLORIDE 9 MG/ML
INJECTION, SOLUTION INTRAVENOUS
Status: DISCONTINUED | OUTPATIENT
Start: 2022-09-04 | End: 2022-09-06 | Stop reason: HOSPADM

## 2022-09-04 RX ORDER — POTASSIUM CHLORIDE 7.45 MG/ML
10 INJECTION INTRAVENOUS
Status: DISPENSED | OUTPATIENT
Start: 2022-09-04 | End: 2022-09-04

## 2022-09-04 RX ADMIN — HYDRALAZINE HYDROCHLORIDE 10 MG: 20 INJECTION, SOLUTION INTRAMUSCULAR; INTRAVENOUS at 09:09

## 2022-09-04 RX ADMIN — CEFEPIME 2 G: 2 INJECTION, POWDER, FOR SOLUTION INTRAVENOUS at 05:09

## 2022-09-04 RX ADMIN — ONDANSETRON 4 MG: 2 INJECTION INTRAMUSCULAR; INTRAVENOUS at 10:09

## 2022-09-04 RX ADMIN — Medication 10 ML: at 02:09

## 2022-09-04 RX ADMIN — PROCHLORPERAZINE EDISYLATE 5 MG: 5 INJECTION INTRAMUSCULAR; INTRAVENOUS at 02:09

## 2022-09-04 RX ADMIN — POTASSIUM CHLORIDE 10 MEQ: 7.46 INJECTION, SOLUTION INTRAVENOUS at 11:09

## 2022-09-04 RX ADMIN — METOPROLOL TARTRATE 25 MG: 25 TABLET, FILM COATED ORAL at 09:09

## 2022-09-04 RX ADMIN — POTASSIUM BICARBONATE 25 MEQ: 978 TABLET, EFFERVESCENT ORAL at 09:09

## 2022-09-04 RX ADMIN — POTASSIUM CHLORIDE 10 MEQ: 7.46 INJECTION, SOLUTION INTRAVENOUS at 01:09

## 2022-09-04 RX ADMIN — Medication 10 ML: at 09:09

## 2022-09-04 RX ADMIN — OXCARBAZEPINE 150 MG: 150 TABLET, FILM COATED ORAL at 09:09

## 2022-09-04 RX ADMIN — ONDANSETRON 4 MG: 2 INJECTION INTRAMUSCULAR; INTRAVENOUS at 07:09

## 2022-09-04 RX ADMIN — PHENOBARBITAL 97.2 MG: 32.4 TABLET ORAL at 09:09

## 2022-09-04 RX ADMIN — LOSARTAN POTASSIUM 50 MG: 50 TABLET, FILM COATED ORAL at 09:09

## 2022-09-04 RX ADMIN — LEVOTHYROXINE SODIUM 125 MCG: 75 TABLET ORAL at 05:09

## 2022-09-04 RX ADMIN — HYDRALAZINE HYDROCHLORIDE 10 MG: 20 INJECTION, SOLUTION INTRAMUSCULAR; INTRAVENOUS at 05:09

## 2022-09-04 RX ADMIN — ACETAMINOPHEN 650 MG: 325 TABLET, FILM COATED ORAL at 11:09

## 2022-09-04 RX ADMIN — POLYETHYLENE GLYCOL 3350 17 G: 17 POWDER, FOR SOLUTION ORAL at 09:09

## 2022-09-04 RX ADMIN — CEFEPIME 2 G: 2 INJECTION, POWDER, FOR SOLUTION INTRAVENOUS at 10:09

## 2022-09-04 RX ADMIN — Medication 800 MG: at 09:09

## 2022-09-04 RX ADMIN — CEFEPIME 2 G: 2 INJECTION, POWDER, FOR SOLUTION INTRAVENOUS at 03:09

## 2022-09-04 RX ADMIN — HYDRALAZINE HYDROCHLORIDE 10 MG: 20 INJECTION, SOLUTION INTRAMUSCULAR; INTRAVENOUS at 02:09

## 2022-09-04 RX ADMIN — POTASSIUM CHLORIDE 10 MEQ: 7.46 INJECTION, SOLUTION INTRAVENOUS at 10:09

## 2022-09-04 RX ADMIN — METOPROLOL TARTRATE 50 MG: 50 TABLET, FILM COATED ORAL at 09:09

## 2022-09-04 RX ADMIN — PRAVASTATIN SODIUM 20 MG: 10 TABLET ORAL at 09:09

## 2022-09-04 RX ADMIN — Medication 10 ML: at 05:09

## 2022-09-04 RX ADMIN — SODIUM CHLORIDE: 0.9 INJECTION, SOLUTION INTRAVENOUS at 06:09

## 2022-09-04 NOTE — SUBJECTIVE & OBJECTIVE
Interval History:awake and alert, s/p primary parastomal hernia repair for high-grade SBO within her parastomal hernia on 9/1. Ostomy with some gas and old blood drainage.   Complaint nausea  Appreciates GI rec's   Elevated blood pressure:  Status schedule IV hydralazine until able to tolerate p.o.    Replace K     Review of Systems   Constitutional:  Negative for fever.   Respiratory:  Negative for shortness of breath.    Gastrointestinal:  Positive for constipation. Negative for abdominal distention, abdominal pain, diarrhea, nausea and vomiting.   Neurological:  Positive for weakness.   Objective:     Vital Signs (Most Recent):  Temp: 98.8 °F (37.1 °C) (09/04/22 0732)  Pulse: 95 (09/04/22 0732)  Resp: 18 (09/04/22 0732)  BP: (!) 161/70 (09/04/22 0732)  SpO2: (!) 92 % (09/04/22 0732)   Vital Signs (24h Range):  Temp:  [97.8 °F (36.6 °C)-98.8 °F (37.1 °C)] 98.8 °F (37.1 °C)  Pulse:  [75-95] 95  Resp:  [16-20] 18  SpO2:  [92 %-95 %] 92 %  BP: (121-192)/(56-79) 161/70     Weight: 73.5 kg (162 lb 0.6 oz)  Body mass index is 33.87 kg/m².    Intake/Output Summary (Last 24 hours) at 9/4/2022 1033  Last data filed at 9/4/2022 0604  Gross per 24 hour   Intake 675 ml   Output 1675 ml   Net -1000 ml        Physical Exam  Vitals and nursing note reviewed.   Constitutional:       Appearance: Normal appearance. She is obese. She is not ill-appearing, toxic-appearing or diaphoretic.   HENT:      Head: Normocephalic and atraumatic.      Nose:      Comments: NG tube in place     Mouth/Throat:      Mouth: Mucous membranes are dry.   Eyes:      Pupils: Pupils are equal, round, and reactive to light.   Cardiovascular:      Rate and Rhythm: Normal rate and regular rhythm.      Pulses: Normal pulses.      Heart sounds: Normal heart sounds.   Pulmonary:      Effort: Pulmonary effort is normal. No respiratory distress.      Breath sounds: Normal breath sounds.   Abdominal:      General: The ostomy site is clean. There is no distension.       Palpations: Abdomen is soft.      Tenderness: There is no abdominal tenderness.      Comments: Left colostomy with mild blood drainage, no gas or stool  Bowel sounds absent   Musculoskeletal:         General: No swelling. Normal range of motion.      Cervical back: Normal range of motion.   Skin:     General: Skin is warm.      Capillary Refill: Capillary refill takes 2 to 3 seconds.      Comments: Skin irritation noted around ostomy site   Neurological:      General: No focal deficit present.      Mental Status: She is alert and oriented to person, place, and time. Mental status is at baseline.   Psychiatric:         Mood and Affect: Mood normal.         Behavior: Behavior normal.       Significant Labs: ABGs: No results for input(s): PH, PCO2, HCO3, POCSATURATED, BE, TOTALHB, COHB, METHB, O2HB, POCFIO2, PO2 in the last 48 hours.  Blood Culture: No results for input(s): LABBLOO in the last 48 hours.  CBC:   Recent Labs   Lab 09/03/22  0621   WBC 6.61   HGB 10.1*   HCT 29.9*          CMP:   Recent Labs   Lab 09/03/22  0621   *   K 3.0*   CL 96   CO2 27   *   BUN 15   CREATININE 0.6   CALCIUM 8.0*   ANIONGAP 8       Lactic Acid: No results for input(s): LACTATE in the last 48 hours.  Lipase: No results for input(s): LIPASE in the last 48 hours.  Lipid Panel: No results for input(s): CHOL, HDL, LDLCALC, TRIG, CHOLHDL in the last 48 hours.  Magnesium:   Recent Labs   Lab 09/03/22  0621   MG 2.0       Troponin: No results for input(s): TROPONINI in the last 48 hours.  TSH: No results for input(s): TSH in the last 4320 hours.  Urine Culture: No results for input(s): LABURIN in the last 48 hours.  Urine Studies: No results for input(s): COLORU, APPEARANCEUA, PHUR, SPECGRAV, PROTEINUA, GLUCUA, KETONESU, BILIRUBINUA, OCCULTUA, NITRITE, UROBILINOGEN, LEUKOCYTESUR, RBCUA, WBCUA, BACTERIA, SQUAMEPITHEL, HYALINECASTS in the last 48 hours.    Invalid input(s): WRIGHTSUR    Significant Imaging: I have  reviewed all pertinent imaging results/findings within the past 24 hours.

## 2022-09-04 NOTE — PT/OT/SLP EVAL
Physical Therapy Evaluation    Patient Name:  Annette Garcia   MRN:  [id number]    Recommendations:     Discharge Recommendations:  nursing facility, basic (with part B therapy services)   Discharge Equipment Recommendations: none   Barriers to discharge: None    Assessment:     Annette Garcia is a 86 y.o. female admitted with a medical diagnosis of Small bowel obstruction.  She presents with the following impairments/functional limitations:  weakness, impaired functional mobility, decreased lower extremity function, decreased upper extremity function, impaired self care skills, pain, impaired skin, decreased ROM .Bed level PT Eval performed due to pt refusing to sit EOB 2/2 nausea. Increased time spent educating pt on importance of OOB mobility and risks of immobility, however, pt continuing to decline OOB mobility. Supine therex performed. Pt reports she transfers bed <> w/c at NH with staff assist. Pt reports she only ambulates with therapy staff at NH. Anticipating return to NH and recommending part B therapy services. Will update POC as necessary.     Rehab Prognosis: Good; patient would benefit from acute skilled PT services to address these deficits and reach maximum level of function.    Recent Surgery: Procedure(s) (LRB):  REPAIR, HERNIA, INCISIONAL OR VENTRAL, WITHOUT HISTORY OF PRIOR REPAIR (N/A) 3 Days Post-Op    Plan:     During this hospitalization, patient to be seen 3 x/week to address the identified rehab impairments via gait training, therapeutic activities, therapeutic exercises, neuromuscular re-education, wheelchair management/training and progress toward the following goals:    Plan of Care Expires:  10/04/22    Subjective     Chief Complaint: nausea and post-op abdominal pain  Patient/Family Comments/goals: Pt reports she receives good care at NH  Pain/Comfort:  Pain Rating 1: 7/10  Location 1: abdomen  Pain Addressed 1: Nurse notified  Pain Rating Post-Intervention 1: 7/10    Patients  "cultural, spiritual, Yazidism conflicts given the current situation:      Living Environment:  Pt is a resident at [facility].   Prior to admission, patients level of function was needing assist bed <> w/c and for ADL's such as dressing, bathing, etc. Pt reports she is able to perform w/c propulsion. Pt reports she only ambulates with therapy at NH with RW ("all the way down the [address]") and that she was receiving restorative care. Equipment used at home: wheelchair, hospital bed, walker, rolling.  DME owned (not currently used): none.  Upon discharge, patient will have assistance from NH staff.    Objective:     Communicated with nsg prior to session.  Patient found HOB elevated with peripheral IV  upon PT entry to room.    General Precautions: Standard, fall   Orthopedic Precautions:N/A   Braces: N/A  Respiratory Status: Room air    Exams:  Cognitive Exam:  Patient is oriented to Person, Place, Time, and Situation  Skin Integrity/Edema:      -       Edema: Moderate LLE and LUE; mild RLE; B LE warm to touch and LLE tender to palpation; nsg notified  RUE ROM: R shld AROM ~50% of PROM limited by pain/weakness; pt reports hx of RTC issues   RUE Strength: R shld abduction/flexion grossly 2+ to 3-/5; elbow flex/ext grossly 4/5;  strength WFL  LUE ROM: WFL  LUE Strength: grossly 4/5;  strength WFL  RLE ROM: WFL  RLE Strength: grossly 4- to 4/5   LLE ROM: WFL  LLE Strength: grossly 4- to 4/5    Functional Mobility:  N/A    Therapeutic Activities and Exercises:   Bed level PT Eval performed due to pt refusing to sit EOB 2/2 nausea.   Pt refused B rolling and scooting 2/2 concern for movement to worsen nausea.  Notified RN who reports she administered medicine for nausea this morning.  Increased time spent educating pt on importance of OOB mobility and risks of immobility, however, pt continuing to decline OOB mobility.   Supine therex performed ~5-6 reps ea: AAROM R UE shld flexion, AROM L UE; B LE Aps, heel slides, " hip abduction/adduction, and SAQs.    AM-PAC 6 CLICK MOBILITY  Total Score:7     Patient left HOB elevated with all lines intact, call button in reach, bed alarm on, and nsg notified.    GOALS:   Multidisciplinary Problems       Physical Therapy Goals          Problem: Physical Therapy    Goal Priority Disciplines Outcome Goal Variances Interventions   Physical Therapy Goal     PT, PT/OT Ongoing, Progressing     Description: Goals to be met by: 10/4/22     Patient will increase functional independence with mobility by performin. Supine to sit with Moderate Assistance  2. Sit to supine with Moderate Assistance  3. Sit to stand transfer with Moderate Assistance  4. Bed to chair transfer with Minimal Assistance using Rolling Walker  5. Gait  x 25 feet with Minimal Assistance using Rolling Walker.                          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    In the setting of septic shock and near death    Petit mal epilepsy     Scoliosis of lumbar spine     Seizures     Unspecified hypothyroidism     UTI (urinary tract infection) 2022       Past Surgical History:   Procedure Laterality Date    APPENDECTOMY      BACK SURGERY      vertebral fracture    BACK SURGERY  2013    lumbar L2-5    CATARACT EXTRACTION, BILATERAL Bilateral     CHOLECYSTECTOMY      open    EYE SURGERY Bilateral     cataract removal with lens implant    HYSTERECTOMY      PERCUTANEOUS TRANSLUMINAL ANGIOPLASTY (PTA) OF PERIPHERAL VESSEL N/A 2/3/2020    Procedure: PTA, PERIPHERAL VESSEL;  Surgeon: Timmy Simmons MD;  Location: [facility] CATH LAB/EP;  Service: Cardiology;  Laterality: N/A;    PORTACATH PLACEMENT Right  09/2016    RENAL ARTERY STENT Left 07/19/2017    SIGMOIDECTOMY  10/29/2019    SMALL INTESTINE SURGERY  08/23/2016    THROMBECTOMY N/A 2/3/2020    Procedure: THROMBECTOMY;  Surgeon: Timmy Simmons MD;  Location: [facility] CATH LAB/EP;  Service: Cardiology;  Laterality: N/A;    TONSILLECTOMY      VAGINAL HYSTERECTOMY W/ ANTERIOR AND POSTERIOR VAGINAL REPAIR         Time Tracking:     PT Received On: 09/04/22  PT Start Time: 1048     PT Stop Time: 1109  PT Total Time (min): 21 min     Billable Minutes: Evaluation 10 and Therapeutic Exercise 11       09/04/2022

## 2022-09-04 NOTE — PLAN OF CARE
Problem: Physical Therapy  Goal: Physical Therapy Goal  Description: Goals to be met by: 10/4/22     Patient will increase functional independence with mobility by performin. Supine to sit with Moderate Assistance  2. Sit to supine with Moderate Assistance  3. Sit to stand transfer with Moderate Assistance  4. Bed to chair transfer with Minimal Assistance using Rolling Walker  5. Gait  x 25 feet with Minimal Assistance using Rolling Walker.     Outcome: Ongoing, Progressing     Bed level PT Eval performed due to pt refusing to sit EOB 2/2 nausea. Increased time spent educating pt on importance of OOB mobility and risks of immobility, however, pt continuing to decline OOB mobility. Supine therex performed. Pt reports she transfers bed <> w/c at NH with staff assist. Pt reports she only ambulates with therapy staff at NH. Anticipating return to NH and recommending part B therapy services. Will update POC as necessary.

## 2022-09-04 NOTE — PROGRESS NOTES
[facility] Medicine  Progress Note    Patient Name: Annette Garcia  MRN: [id number]  Patient Class: IP- Inpatient   Admission Date: 8/29/2022  Length of Stay: 5 days  Attending Physician: Alice Barker*  Primary Care Provider: Jose Valles MD        Subjective:     Principal Problem:Small bowel obstruction        HPI:  Annette Garcia is an 86-year-old female with a history of history of CAD, disorder of kidney and ureter, HTN, lone atrial fibrillation, seizures, hypothyroidism who presented to the ED from [facility] for evaluation of recurrent episodes of vomiting as well as nausea today. She has multiple medical comorbidities including ostomy. She presented from [facility] to the ED for evaluation of multiple (she reports 40) episodes of vomiting yesterday. She associated vomiting with nausea, abdominal pain, lack of appetite, and fatigue. She has a noted LLQ colostomy with formed stool in pouch, no blood noted. She says her abdominal pain is currently gone however it is achy and intermittent in nature. She denies chest pain, SOB, fever, chills, changes in bowel or bladder changes, urinary frequency, burning, or odor. Of note she was admitted 5/20/22 for partial bowel obstruction.    In the ED: labs pretty unremarkable. UA with noted UTI. CT abdomen with High-grade small bowel obstruction with a transition point within a left lower quadrant parastomal hernia.  No evidence of bowel wall thickening, pneumatosis or gross perforation. 2. Distal esophageal wall thickening and luminal fluid which may be related to reflux esophagitis.  Rectum further evaluation with EGD to exclude and underlying lesion if not already performed. 3. Tree-in-bud nodularity in the right middle lobe with mild bronchiectasis in the middle lobe and lingula.  Findings are concerning for an atypical infectious process such as MAC.  Mild aspiration not excluded. CXR with No acute cardiopulmonary  abnormality. She was given IVF, pain medication, started on aztreonam for UTI. Admitted to [facility] for further care.      Overview/Hospital Course:  No notes on file    Interval History:awake and alert, s/p primary parastomal hernia repair for high-grade SBO within her parastomal hernia on 9/1. Ostomy with some gas and old blood drainage.   Complaint nausea  Appreciates GI rec's   Elevated blood pressure:  Status schedule IV hydralazine until able to tolerate p.o.    Replace K     Review of Systems   Constitutional:  Negative for fever.   Respiratory:  Negative for shortness of breath.    Gastrointestinal:  Positive for constipation. Negative for abdominal distention, abdominal pain, diarrhea, nausea and vomiting.   Neurological:  Positive for weakness.   Objective:     Vital Signs (Most Recent):  Temp: 98.8 °F (37.1 °C) (09/04/22 0732)  Pulse: 95 (09/04/22 0732)  Resp: 18 (09/04/22 0732)  BP: (!) 161/70 (09/04/22 0732)  SpO2: (!) 92 % (09/04/22 0732)   Vital Signs (24h Range):  Temp:  [97.8 °F (36.6 °C)-98.8 °F (37.1 °C)] 98.8 °F (37.1 °C)  Pulse:  [75-95] 95  Resp:  [16-20] 18  SpO2:  [92 %-95 %] 92 %  BP: (121-192)/(56-79) 161/70     Weight: 73.5 kg (162 lb 0.6 oz)  Body mass index is 33.87 kg/m².    Intake/Output Summary (Last 24 hours) at 9/4/2022 1033  Last data filed at 9/4/2022 0604  Gross per 24 hour   Intake 675 ml   Output 1675 ml   Net -1000 ml        Physical Exam  Vitals and nursing note reviewed.   Constitutional:       Appearance: Normal appearance. She is obese. She is not ill-appearing, toxic-appearing or diaphoretic.   HENT:      Head: Normocephalic and atraumatic.      Nose:      Comments: NG tube in place     Mouth/Throat:      Mouth: Mucous membranes are dry.   Eyes:      Pupils: Pupils are equal, round, and reactive to light.   Cardiovascular:      Rate and Rhythm: Normal rate and regular rhythm.      Pulses: Normal pulses.      Heart sounds: Normal heart sounds.   Pulmonary:       Effort: Pulmonary effort is normal. No respiratory distress.      Breath sounds: Normal breath sounds.   Abdominal:      General: The ostomy site is clean. There is no distension.      Palpations: Abdomen is soft.      Tenderness: There is no abdominal tenderness.      Comments: Left colostomy with mild blood drainage, no gas or stool  Bowel sounds absent   Musculoskeletal:         General: No swelling. Normal range of motion.      Cervical back: Normal range of motion.   Skin:     General: Skin is warm.      Capillary Refill: Capillary refill takes 2 to 3 seconds.      Comments: Skin irritation noted around ostomy site   Neurological:      General: No focal deficit present.      Mental Status: She is alert and oriented to person, place, and time. Mental status is at baseline.   Psychiatric:         Mood and Affect: Mood normal.         Behavior: Behavior normal.       Significant Labs: ABGs: No results for input(s): PH, PCO2, HCO3, POCSATURATED, BE, TOTALHB, COHB, METHB, O2HB, POCFIO2, PO2 in the last 48 hours.  Blood Culture: No results for input(s): LABBLOO in the last 48 hours.  CBC:   Recent Labs   Lab 09/03/22  0621   WBC 6.61   HGB 10.1*   HCT 29.9*          CMP:   Recent Labs   Lab 09/03/22  0621   *   K 3.0*   CL 96   CO2 27   *   BUN 15   CREATININE 0.6   CALCIUM 8.0*   ANIONGAP 8       Lactic Acid: No results for input(s): LACTATE in the last 48 hours.  Lipase: No results for input(s): LIPASE in the last 48 hours.  Lipid Panel: No results for input(s): CHOL, HDL, LDLCALC, TRIG, CHOLHDL in the last 48 hours.  Magnesium:   Recent Labs   Lab 09/03/22  0621   MG 2.0       Troponin: No results for input(s): TROPONINI in the last 48 hours.  TSH: No results for input(s): TSH in the last 4320 hours.  Urine Culture: No results for input(s): LABURIN in the last 48 hours.  Urine Studies: No results for input(s): COLORU, APPEARANCEUA, PHUR, SPECGRAV, PROTEINUA, GLUCUA, KETONESU, BILIRUBINUA,  OCCULTUA, NITRITE, UROBILINOGEN, LEUKOCYTESUR, RBCUA, WBCUA, BACTERIA, SQUAMEPITHEL, HYALINECASTS in the last 48 hours.    Invalid input(s): GEMMA    Significant Imaging: I have reviewed all pertinent imaging results/findings within the past 24 hours.      Assessment/Plan:      * Small bowel obstruction  -c/o N/V and abdominal pain  -CT abdomen with High-grade small bowel obstruction with a transition point within a left lower quadrant parastomal hernia. No evidence of bowel wall thickening, pneumatosis or gross perforation.  -mild distention and tender to palpation around colostomy noted. Colostomy with brown stool in pouch  -NPO  -NGT to LIWS  -IVF; IV antibiotics  -consult general surgery- s/p primary parastomal hernia repair for high-grade SBO within her parastomal hernia on 9/1      UTI (urinary tract infection)  -UA positive  -On aztreonam     Colostomy in place  -Noted  -medium amount formed brown stool noted at admission, none today  -site intact with irritation around--consult wound care      Chronic anemia  -stable  -monitor      Coronary artery disease  -Resume home statin, BB  -Resume home eliquis after surgery      Paroxysmal atrial fibrillation  Patient with Paroxysmal (<7 days) atrial fibrillation which is controlled currently with Beta Blocker. Patient is currently in sinus rhythm.[id number] Score: 4. HASBLED Score: Unable to calculate. Anticoagulation indicated. Anticoagulation done with home eliquis.  -general surgery will be planning OR later this week; holding anticoagulation      Hypertension  Starts schedule IV hydralazine until patient able to tolerate p.o.      Acquired hypothyroidism  -continue home synthroid      Epilepsy  -continue home trileptal and phenobarbital        VTE Risk Mitigation (From admission, onward)         Ordered     IP VTE HIGH RISK PATIENT  Once         08/30/22 0120     Place sequential compression device  Until discontinued         08/30/22 0120                 Discharge Planning   JESSICA: 9/5/2022     Code Status: Full Code   Is the patient medically ready for discharge?:     Reason for patient still in hospital (select all that apply): Patient trending condition  Discharge Plan A: Return to nursing home   Discharge Delays: None known at this time              Alice Barker MD  Department of [facility] Medicine   [facility]

## 2022-09-04 NOTE — PLAN OF CARE
Pt AAO4. RA. No complaint of abdominal or any any other type of pain. No signs of distress. Last BM 9/1. Colostomy  bag empty of stool and has bloody drainage inside. Suppository refused, but Murelax taken.  Will continue to monitor.     Problem: Adult Inpatient Plan of Care  Goal: Plan of Care Review  Outcome: Ongoing, Progressing  Goal: Patient-Specific Goal (Individualized)  Outcome: Ongoing, Progressing  Goal: Absence of Hospital-Acquired Illness or Injury  Outcome: Ongoing, Progressing  Goal: Optimal Comfort and Wellbeing  Outcome: Ongoing, Progressing  Goal: Readiness for Transition of Care  Outcome: Ongoing, Progressing     Problem: Fluid Deficit (Intestinal Obstruction)  Goal: Fluid Balance  Outcome: Ongoing, Progressing     Problem: Infection (Intestinal Obstruction)  Goal: Absence of Infection Signs and Symptoms  Outcome: Ongoing, Progressing     Problem: Nausea and Vomiting (Intestinal Obstruction)  Goal: Nausea and Vomiting Relief  Outcome: Ongoing, Progressing     Problem: Pain (Intestinal Obstruction)  Goal: Acceptable Pain Control  Outcome: Ongoing, Progressing     Problem: Balance Impairment (Functional Deficit)  Goal: Improved Balance and Postural Control  Outcome: Ongoing, Progressing     Problem: Muscle Strength Impairment (Functional Deficit)  Goal: Improved Muscle Strength  Outcome: Ongoing, Progressing     Problem: Muscle Tone Impairment (Functional Deficit)  Goal: Improved Muscle Tone  Outcome: Ongoing, Progressing     Problem: Range of Motion Impairment (Functional Deficit)  Goal: Optimal Range of Motion  Outcome: Ongoing, Progressing     Problem: Sensory Impairment (Functional Deficit)  Goal: Compensation for Sensory Deficit  Outcome: Ongoing, Progressing     Problem: Hypertension Comorbidity  Goal: Blood Pressure in Desired Range  Outcome: Ongoing, Progressing     Problem: Seizure Disorder Comorbidity  Goal: Maintenance of Seizure Control  Outcome: Ongoing, Progressing     Problem: Fall  Injury Risk  Goal: Absence of Fall and Fall-Related Injury  Outcome: Ongoing, Progressing     Problem: Skin Injury Risk Increased  Goal: Skin Health and Integrity  Outcome: Ongoing, Progressing     Problem: Bleeding (Surgery Nonspecified)  Goal: Absence of Bleeding  Outcome: Ongoing, Progressing     Problem: Bowel Motility Impaired (Surgery Nonspecified)  Goal: Effective Bowel Elimination  Outcome: Ongoing, Progressing     Problem: Fluid and Electrolyte Imbalance (Surgery Nonspecified)  Goal: Fluid and Electrolyte Balance  Outcome: Ongoing, Progressing     Problem: Glycemic Control Impaired (Surgery Nonspecified)  Goal: Blood Glucose Level Within Targeted Range  Outcome: Ongoing, Progressing     Problem: Infection (Surgery Nonspecified)  Goal: Absence of Infection Signs and Symptoms  Outcome: Ongoing, Progressing     Problem: Pain (Surgery Nonspecified)  Goal: Acceptable Pain Control  Outcome: Ongoing, Progressing     Problem: Postoperative Nausea and Vomiting (Surgery Nonspecified)  Goal: Nausea and Vomiting Relief  Outcome: Ongoing, Progressing     Problem: Postoperative Urinary Retention (Surgery Nonspecified)  Goal: Effective Urinary Elimination  Outcome: Ongoing, Progressing     Problem: Respiratory Compromise (Surgery Nonspecified)  Goal: Effective Oxygenation and Ventilation  Outcome: Ongoing, Progressing     Problem: UTI (Urinary Tract Infection)  Goal: Improved Infection Symptoms  Outcome: Ongoing, Progressing

## 2022-09-04 NOTE — PROGRESS NOTES
[facility]  [facility]  Progress Note    Subjective:     Interval History:   Some local soreness, pain improving  Soft diet, did have some nausea  Flatus in colostomy bag, small amount of stool      Post-Op Info:  Procedure(s) (LRB):  REPAIR, HERNIA, INCISIONAL OR VENTRAL, WITHOUT HISTORY OF PRIOR REPAIR (N/A)   3 Days Post-Op      Medications:  Continuous Infusions:   sodium chloride 0.9% 25 mL/hr at 09/04/22 0604     Scheduled Meds:   bisacodyL  10 mg Rectal Daily    ceFEPime (MAXIPIME) IVPB  2 g Intravenous Q8H    hydrALAZINE  10 mg Intravenous Q8H    levothyroxine  125 mcg Oral Before breakfast    losartan  50 mg Oral Daily    magnesium oxide  800 mg Oral Daily    metoprolol tartrate  50 mg Oral BID    morphine  2 mg Intravenous Once    OXcarbazepine  150 mg Oral Nightly    PHENobarbitaL  97.2 mg Per NG tube Nightly    polyethylene glycol  17 g Oral BID    potassium chloride  10 mEq Intravenous Q1H    pravastatin  20 mg Oral Daily    sodium chloride 0.9%  250 mL Intravenous Once    sodium chloride 0.9%  10 mL Intravenous Q8H     PRN Meds:sodium chloride 0.9%, acetaminophen, albuterol-ipratropium, dextrose 10%, dextrose 10%, glucagon (human recombinant), glucose, glucose, hydrALAZINE, HYDROmorphone, lorazepam, melatonin, naloxone, ondansetron, oxyCODONE, prochlorperazine, prochlorperazine, sodium chloride 0.9%, sodium chloride 0.9%     Objective:     Vital Signs (Most Recent):  Temp: 98.8 °F (37.1 °C) (09/04/22 0732)  Pulse: 95 (09/04/22 0732)  Resp: 18 (09/04/22 0732)  BP: (!) 161/70 (09/04/22 0732)  SpO2: (!) 92 % (09/04/22 0732)   Vital Signs (24h Range):  Temp:  [98.3 °F (36.8 °C)-98.8 °F (37.1 °C)] 98.8 °F (37.1 °C)  Pulse:  [77-95] 95  Resp:  [16-20] 18  SpO2:  [92 %-95 %] 92 %  BP: (121-192)/(56-79) 161/70       Intake/Output Summary (Last 24 hours) at 9/4/2022 1117  Last data filed at 9/4/2022 0604  Gross per 24 hour   Intake 675 ml   Output 1675 ml   Net -1000 ml         Physical Exam  Some  gas in bag. Some serosang drainage.     Significant Labs:  CBC:   Recent Labs   Lab 09/03/22  0621   WBC 6.61   RBC 3.08*   HGB 10.1*   HCT 29.9*      MCV 97   MCH 32.8*   MCHC 33.8       CMP:   Recent Labs   Lab 09/03/22  0621   *   CALCIUM 8.0*   *   K 3.0*   CO2 27   CL 96   BUN 15   CREATININE 0.6         Significant Diagnostics:  I have reviewed all pertinent imaging results/findings within the past 24 hours.    Assessment/Plan:     Active Diagnoses:    Diagnosis Date Noted POA    PRINCIPAL PROBLEM:  Small bowel obstruction [K56.609] 08/30/2022 Yes    UTI (urinary tract infection) [N39.0] 05/22/2022 Yes    Colostomy in place [Z93.3] 09/14/2020 Not Applicable    Coronary artery disease [I25.10] 08/14/2020 Yes    Chronic anemia [D64.9] 08/14/2020 Yes    Paroxysmal atrial fibrillation [I48.0] 12/28/2019 Yes     Chronic    Hypertension [I10] 12/05/2016 Yes    Acquired hypothyroidism [E03.9] 08/18/2016 Yes     Chronic    Epilepsy [G40.909] 08/18/2016 Yes     Chronic      Problems Resolved During this Admission:     S/p parastomal hernia repair, primary  Diet as tolerated  Colace  Bowel function improving  OOB as much as possible    Chris Johnson MD  General Surgery  [place] - Telemetry

## 2022-09-05 LAB
ANION GAP SERPL CALC-SCNC: 9 MMOL/L (ref 8–16)
BASOPHILS # BLD AUTO: 0.03 K/UL (ref 0–0.2)
BASOPHILS NFR BLD: 0.5 % (ref 0–1.9)
BUN SERPL-MCNC: 12 MG/DL (ref 8–23)
CALCIUM SERPL-MCNC: 8.2 MG/DL (ref 8.7–10.5)
CHLORIDE SERPL-SCNC: 92 MMOL/L (ref 95–110)
CO2 SERPL-SCNC: 29 MMOL/L (ref 23–29)
CREAT SERPL-MCNC: 0.6 MG/DL (ref 0.5–1.4)
DIFFERENTIAL METHOD: ABNORMAL
EOSINOPHIL # BLD AUTO: 0.2 K/UL (ref 0–0.5)
EOSINOPHIL NFR BLD: 3.7 % (ref 0–8)
ERYTHROCYTE [DISTWIDTH] IN BLOOD BY AUTOMATED COUNT: 13.7 % (ref 11.5–14.5)
EST. GFR  (NO RACE VARIABLE): >60 ML/MIN/1.73 M^2
GLUCOSE SERPL-MCNC: 109 MG/DL (ref 70–110)
HCT VFR BLD AUTO: 29.2 % (ref 37–48.5)
HGB BLD-MCNC: 9.8 G/DL (ref 12–16)
IMM GRANULOCYTES # BLD AUTO: 0.06 K/UL (ref 0–0.04)
IMM GRANULOCYTES NFR BLD AUTO: 1 % (ref 0–0.5)
LYMPHOCYTES # BLD AUTO: 1.7 K/UL (ref 1–4.8)
LYMPHOCYTES NFR BLD: 26.6 % (ref 18–48)
MCH RBC QN AUTO: 32.8 PG (ref 27–31)
MCHC RBC AUTO-ENTMCNC: 33.6 G/DL (ref 32–36)
MCV RBC AUTO: 98 FL (ref 82–98)
MONOCYTES # BLD AUTO: 0.7 K/UL (ref 0.3–1)
MONOCYTES NFR BLD: 11.1 % (ref 4–15)
NEUTROPHILS # BLD AUTO: 3.6 K/UL (ref 1.8–7.7)
NEUTROPHILS NFR BLD: 57.1 % (ref 38–73)
NRBC BLD-RTO: 0 /100 WBC
PLATELET # BLD AUTO: 205 K/UL (ref 150–450)
PMV BLD AUTO: 9.2 FL (ref 9.2–12.9)
POTASSIUM SERPL-SCNC: 3.5 MMOL/L (ref 3.5–5.1)
RBC # BLD AUTO: 2.99 M/UL (ref 4–5.4)
SODIUM SERPL-SCNC: 130 MMOL/L (ref 136–145)
WBC # BLD AUTO: 6.29 K/UL (ref 3.9–12.7)

## 2022-09-05 PROCEDURE — 99900035 HC TECH TIME PER 15 MIN (STAT)

## 2022-09-05 PROCEDURE — 25000003 PHARM REV CODE 250: Performed by: FAMILY MEDICINE

## 2022-09-05 PROCEDURE — 25000003 PHARM REV CODE 250: Performed by: STUDENT IN AN ORGANIZED HEALTH CARE EDUCATION/TRAINING PROGRAM

## 2022-09-05 PROCEDURE — 11000001 HC ACUTE MED/SURG PRIVATE ROOM

## 2022-09-05 PROCEDURE — 63600175 PHARM REV CODE 636 W HCPCS: Performed by: FAMILY MEDICINE

## 2022-09-05 PROCEDURE — 36415 COLL VENOUS BLD VENIPUNCTURE: CPT | Performed by: STUDENT IN AN ORGANIZED HEALTH CARE EDUCATION/TRAINING PROGRAM

## 2022-09-05 PROCEDURE — 85025 COMPLETE CBC W/AUTO DIFF WBC: CPT | Performed by: STUDENT IN AN ORGANIZED HEALTH CARE EDUCATION/TRAINING PROGRAM

## 2022-09-05 PROCEDURE — 80048 BASIC METABOLIC PNL TOTAL CA: CPT | Performed by: STUDENT IN AN ORGANIZED HEALTH CARE EDUCATION/TRAINING PROGRAM

## 2022-09-05 PROCEDURE — A4216 STERILE WATER/SALINE, 10 ML: HCPCS | Performed by: STUDENT IN AN ORGANIZED HEALTH CARE EDUCATION/TRAINING PROGRAM

## 2022-09-05 PROCEDURE — 63600175 PHARM REV CODE 636 W HCPCS: Performed by: STUDENT IN AN ORGANIZED HEALTH CARE EDUCATION/TRAINING PROGRAM

## 2022-09-05 PROCEDURE — 94761 N-INVAS EAR/PLS OXIMETRY MLT: CPT

## 2022-09-05 PROCEDURE — 97530 THERAPEUTIC ACTIVITIES: CPT | Performed by: PHYSICAL THERAPIST

## 2022-09-05 RX ORDER — LOSARTAN POTASSIUM 50 MG/1
100 TABLET ORAL DAILY
Status: DISCONTINUED | OUTPATIENT
Start: 2022-09-06 | End: 2022-09-06 | Stop reason: HOSPADM

## 2022-09-05 RX ORDER — POLYETHYLENE GLYCOL 3350 17 G/17G
17 POWDER, FOR SOLUTION ORAL DAILY
Status: DISCONTINUED | OUTPATIENT
Start: 2022-09-05 | End: 2022-09-06 | Stop reason: HOSPADM

## 2022-09-05 RX ORDER — HYDRALAZINE HYDROCHLORIDE 20 MG/ML
10 INJECTION INTRAMUSCULAR; INTRAVENOUS EVERY 8 HOURS
Status: COMPLETED | OUTPATIENT
Start: 2022-09-05 | End: 2022-09-05

## 2022-09-05 RX ADMIN — APIXABAN 2.5 MG: 2.5 TABLET, FILM COATED ORAL at 09:09

## 2022-09-05 RX ADMIN — Medication 10 ML: at 02:09

## 2022-09-05 RX ADMIN — HYDRALAZINE HYDROCHLORIDE 10 MG: 20 INJECTION, SOLUTION INTRAMUSCULAR; INTRAVENOUS at 09:09

## 2022-09-05 RX ADMIN — LOSARTAN POTASSIUM 50 MG: 50 TABLET, FILM COATED ORAL at 08:09

## 2022-09-05 RX ADMIN — POLYETHYLENE GLYCOL 3350 17 G: 17 POWDER, FOR SOLUTION ORAL at 08:09

## 2022-09-05 RX ADMIN — LEVOTHYROXINE SODIUM 125 MCG: 75 TABLET ORAL at 05:09

## 2022-09-05 RX ADMIN — METOPROLOL TARTRATE 50 MG: 50 TABLET, FILM COATED ORAL at 09:09

## 2022-09-05 RX ADMIN — HYDRALAZINE HYDROCHLORIDE 10 MG: 20 INJECTION, SOLUTION INTRAMUSCULAR; INTRAVENOUS at 02:09

## 2022-09-05 RX ADMIN — CEFEPIME 2 G: 2 INJECTION, POWDER, FOR SOLUTION INTRAVENOUS at 09:09

## 2022-09-05 RX ADMIN — POTASSIUM BICARBONATE 25 MEQ: 978 TABLET, EFFERVESCENT ORAL at 10:09

## 2022-09-05 RX ADMIN — Medication 800 MG: at 08:09

## 2022-09-05 RX ADMIN — PHENOBARBITAL 97.2 MG: 32.4 TABLET ORAL at 09:09

## 2022-09-05 RX ADMIN — Medication 10 ML: at 09:09

## 2022-09-05 RX ADMIN — METOPROLOL TARTRATE 50 MG: 50 TABLET, FILM COATED ORAL at 08:09

## 2022-09-05 RX ADMIN — PRAVASTATIN SODIUM 20 MG: 10 TABLET ORAL at 08:09

## 2022-09-05 RX ADMIN — CEFEPIME 2 G: 2 INJECTION, POWDER, FOR SOLUTION INTRAVENOUS at 05:09

## 2022-09-05 RX ADMIN — CEFEPIME 2 G: 2 INJECTION, POWDER, FOR SOLUTION INTRAVENOUS at 02:09

## 2022-09-05 RX ADMIN — Medication 10 ML: at 05:09

## 2022-09-05 RX ADMIN — APIXABAN 2.5 MG: 2.5 TABLET, FILM COATED ORAL at 10:09

## 2022-09-05 RX ADMIN — OXCARBAZEPINE 150 MG: 150 TABLET, FILM COATED ORAL at 09:09

## 2022-09-05 RX ADMIN — HYDRALAZINE HYDROCHLORIDE 10 MG: 20 INJECTION, SOLUTION INTRAMUSCULAR; INTRAVENOUS at 05:09

## 2022-09-05 RX ADMIN — POLYETHYLENE GLYCOL 3350 17 G: 17 POWDER, FOR SOLUTION ORAL at 09:09

## 2022-09-05 NOTE — PROGRESS NOTES
[facility] Medicine  Progress Note    Patient Name: Annette Garcia  MRN: [id number]  Patient Class: IP- Inpatient   Admission Date: 8/29/2022  Length of Stay: 6 days  Attending Physician: Alice Barker*  Primary Care Provider: Jose Valles MD        Subjective:     Principal Problem:Small bowel obstruction        HPI:  Annette Garcia is an 86-year-old female with a history of history of CAD, disorder of kidney and ureter, HTN, lone atrial fibrillation, seizures, hypothyroidism who presented to the ED from [facility] for evaluation of recurrent episodes of vomiting as well as nausea today. She has multiple medical comorbidities including ostomy. She presented from [facility] to the ED for evaluation of multiple (she reports 40) episodes of vomiting yesterday. She associated vomiting with nausea, abdominal pain, lack of appetite, and fatigue. She has a noted LLQ colostomy with formed stool in pouch, no blood noted. She says her abdominal pain is currently gone however it is achy and intermittent in nature. She denies chest pain, SOB, fever, chills, changes in bowel or bladder changes, urinary frequency, burning, or odor. Of note she was admitted 5/20/22 for partial bowel obstruction.    In the ED: labs pretty unremarkable. UA with noted UTI. CT abdomen with High-grade small bowel obstruction with a transition point within a left lower quadrant parastomal hernia.  No evidence of bowel wall thickening, pneumatosis or gross perforation. 2. Distal esophageal wall thickening and luminal fluid which may be related to reflux esophagitis.  Rectum further evaluation with EGD to exclude and underlying lesion if not already performed. 3. Tree-in-bud nodularity in the right middle lobe with mild bronchiectasis in the middle lobe and lingula.  Findings are concerning for an atypical infectious process such as MAC.  Mild aspiration not excluded. CXR with No acute cardiopulmonary  abnormality. She was given IVF, pain medication, started on aztreonam for UTI. Admitted to [facility] for further care.      Overview/Hospital Course:  No notes on file    Interval History:awake and alert, s/p primary parastomal hernia repair for high-grade SBO within her parastomal hernia on 9/1. Ostomy with some stool, reported big bowel movement yesterday. No nausea, tolerating diet . Stool softener    Appreciates GI rec's   Elevated blood pressure:  Status schedule IV hydralazine until able to tolerate p.o- resume Po bp meds    Replace K   Possible discharge tomorrow    Review of Systems   Constitutional:  Negative for fever.   Respiratory:  Negative for shortness of breath.    Gastrointestinal:  Negative for abdominal distention, abdominal pain, constipation, diarrhea, nausea and vomiting.   Neurological:  Positive for weakness.   Objective:     Vital Signs (Most Recent):  Temp: 98.4 °F (36.9 °C) (09/05/22 0740)  Pulse: 87 (09/05/22 0740)  Resp: 16 (09/05/22 0740)  BP: 137/63 (09/05/22 0740)  SpO2: (!) 94 % (09/05/22 0740)   Vital Signs (24h Range):  Temp:  [98.3 °F (36.8 °C)-98.7 °F (37.1 °C)] 98.4 °F (36.9 °C)  Pulse:  [78-97] 87  Resp:  [16-18] 16  SpO2:  [90 %-97 %] 94 %  BP: (137-186)/() 137/63     Weight: 73.5 kg (162 lb 0.6 oz)  Body mass index is 33.87 kg/m².    Intake/Output Summary (Last 24 hours) at 9/5/2022 0953  Last data filed at 9/5/2022 0500  Gross per 24 hour   Intake 0 ml   Output 2030 ml   Net -2030 ml        Physical Exam  Vitals and nursing note reviewed.   Constitutional:       Appearance: Normal appearance. She is obese. She is not ill-appearing, toxic-appearing or diaphoretic.   HENT:      Head: Normocephalic and atraumatic.      Nose:      Comments: NG tube in place     Mouth/Throat:      Mouth: Mucous membranes are dry.   Eyes:      Pupils: Pupils are equal, round, and reactive to light.   Cardiovascular:      Rate and Rhythm: Normal rate and regular rhythm.       Pulses: Normal pulses.      Heart sounds: Normal heart sounds.   Pulmonary:      Effort: Pulmonary effort is normal. No respiratory distress.      Breath sounds: Normal breath sounds.   Abdominal:      General: The ostomy site is clean. There is no distension.      Palpations: Abdomen is soft.      Tenderness: There is no abdominal tenderness.      Comments: Left colostomy with mild blood drainage, no gas or stool  Bowel sounds absent   Musculoskeletal:         General: No swelling. Normal range of motion.      Cervical back: Normal range of motion.   Skin:     General: Skin is warm.      Capillary Refill: Capillary refill takes 2 to 3 seconds.      Comments: Skin irritation noted around ostomy site   Neurological:      General: No focal deficit present.      Mental Status: She is alert and oriented to person, place, and time. Mental status is at baseline.   Psychiatric:         Mood and Affect: Mood normal.         Behavior: Behavior normal.       Significant Labs: ABGs: No results for input(s): PH, PCO2, HCO3, POCSATURATED, BE, TOTALHB, COHB, METHB, O2HB, POCFIO2, PO2 in the last 48 hours.  Blood Culture: No results for input(s): LABBLOO in the last 48 hours.  CBC:   Recent Labs   Lab 09/05/22  0556   WBC 6.29   HGB 9.8*   HCT 29.2*          CMP:   Recent Labs   Lab 09/05/22  0556   *   K 3.5   CL 92*   CO2 29      BUN 12   CREATININE 0.6   CALCIUM 8.2*   ANIONGAP 9       Lactic Acid: No results for input(s): LACTATE in the last 48 hours.  Lipase: No results for input(s): LIPASE in the last 48 hours.  Lipid Panel: No results for input(s): CHOL, HDL, LDLCALC, TRIG, CHOLHDL in the last 48 hours.  Magnesium:   No results for input(s): MG in the last 48 hours.    Troponin: No results for input(s): TROPONINI in the last 48 hours.  TSH: No results for input(s): TSH in the last 4320 hours.  Urine Culture: No results for input(s): LABURIN in the last 48 hours.  Urine Studies: No results for input(s):  COLORU, APPEARANCEUA, PHUR, SPECGRAV, PROTEINUA, GLUCUA, KETONESU, BILIRUBINUA, OCCULTUA, NITRITE, UROBILINOGEN, LEUKOCYTESUR, RBCUA, WBCUA, BACTERIA, SQUAMEPITHEL, HYALINECASTS in the last 48 hours.    Invalid input(s): GEMMA    Significant Imaging: I have reviewed all pertinent imaging results/findings within the past 24 hours.      Assessment/Plan:      * Small bowel obstruction  -c/o N/V and abdominal pain  -CT abdomen with High-grade small bowel obstruction with a transition point within a left lower quadrant parastomal hernia. No evidence of bowel wall thickening, pneumatosis or gross perforation.  -mild distention and tender to palpation around colostomy noted. Colostomy with brown stool in pouch  -NPO  -NGT to LIWS  -IVF; IV antibiotics  -consult general surgery- s/p primary parastomal hernia repair for high-grade SBO within her parastomal hernia on 9/1      UTI (urinary tract infection)  -UA positive  -On aztreonam     Colostomy in place  -Noted  -medium amount formed brown stool noted at admission, none today  -site intact with irritation around--consult wound care      Chronic anemia  -stable  -monitor      Coronary artery disease  -Resume home statin, BB  -Resume home eliquis after surgery      Paroxysmal atrial fibrillation  Patient with Paroxysmal (<7 days) atrial fibrillation which is controlled currently with Beta Blocker. Patient is currently in sinus rhythm.[id number] Score: 4. HASBLED Score: Unable to calculate. Anticoagulation indicated. Anticoagulation done with home eliquis.  -general surgery will be planning OR later this week; holding anticoagulation      Hypertension  Starts schedule IV hydralazine until patient able to tolerate p.o.      Acquired hypothyroidism  -continue home synthroid      Epilepsy  -continue home trileptal and phenobarbital        VTE Risk Mitigation (From admission, onward)         Ordered     apixaban tablet 2.5 mg  2 times daily         09/05/22 0956     IP VTE  HIGH RISK PATIENT  Once         08/30/22 0120     Place sequential compression device  Until discontinued         08/30/22 0120                Discharge Planning   JESSICA: 9/5/2022     Code Status: Full Code   Is the patient medically ready for discharge?:     Reason for patient still in hospital (select all that apply): Patient trending condition  Discharge Plan A: Return to nursing home   Discharge Delays: None known at this time              Alice Barker MD  Department of [facility]   [facility]

## 2022-09-05 NOTE — PROGRESS NOTES
[facility]  General Surgery  Progress Note    Subjective:     Interval History:   Some local soreness, pain improving  Reg diet, denies nausea today  Flatus in colostomy bag, + stool      Post-Op Info:  Procedure(s) (LRB):  REPAIR, HERNIA, INCISIONAL OR VENTRAL, WITHOUT HISTORY OF PRIOR REPAIR (N/A)   4 Days Post-Op      Medications:  Continuous Infusions:   sodium chloride 0.9% 25 mL/hr at 09/04/22 0604     Scheduled Meds:   apixaban  2.5 mg Oral BID    bisacodyL  10 mg Rectal Daily    ceFEPime (MAXIPIME) IVPB  2 g Intravenous Q8H    hydrALAZINE  10 mg Intravenous Q8H    levothyroxine  125 mcg Oral Before breakfast    [START ON 9/6/2022] losartan  100 mg Oral Daily    magnesium oxide  800 mg Oral Daily    metoprolol tartrate  50 mg Oral BID    morphine  2 mg Intravenous Once    OXcarbazepine  150 mg Oral Nightly    PHENobarbitaL  97.2 mg Per NG tube Nightly    polyethylene glycol  17 g Oral BID    polyethylene glycol  17 g Oral Daily    potassium bicarbonate  25 mEq Oral Daily    pravastatin  20 mg Oral Daily    sodium chloride 0.9%  250 mL Intravenous Once    sodium chloride 0.9%  10 mL Intravenous Q8H     PRN Meds:sodium chloride 0.9%, acetaminophen, albuterol-ipratropium, dextrose 10%, dextrose 10%, glucagon (human recombinant), glucose, glucose, hydrALAZINE, HYDROmorphone, lorazepam, melatonin, naloxone, ondansetron, oxyCODONE, prochlorperazine, prochlorperazine, sodium chloride 0.9%, sodium chloride 0.9%     Objective:     Vital Signs (Most Recent):  Temp: 98 °F (36.7 °C) (09/05/22 1127)  Pulse: 78 (09/05/22 1143)  Resp: 15 (09/05/22 1127)  BP: (!) 159/67 (09/05/22 1127)  SpO2: (!) 92 % (09/05/22 1127)   Vital Signs (24h Range):  Temp:  [98 °F (36.7 °C)-98.7 °F (37.1 °C)] 98 °F (36.7 °C)  Pulse:  [74-97] 78  Resp:  [15-18] 15  SpO2:  [90 %-97 %] 92 %  BP: (137-159)/() 159/67       Intake/Output Summary (Last 24 hours) at 9/5/2022 1449  Last data filed at 9/5/2022 0500  Gross per 24 hour   Intake  0 ml   Output 2030 ml   Net -2030 ml         Physical Exam  Some gas in bag. Some serosang drainage.     Significant Labs:  CBC:   Recent Labs   Lab 09/05/22  0556   WBC 6.29   RBC 2.99*   HGB 9.8*   HCT 29.2*      MCV 98   MCH 32.8*   MCHC 33.6       CMP:   Recent Labs   Lab 09/05/22  0556      CALCIUM 8.2*   *   K 3.5   CO2 29   CL 92*   BUN 12   CREATININE 0.6         Significant Diagnostics:  I have reviewed all pertinent imaging results/findings within the past 24 hours.    Assessment/Plan:     Active Diagnoses:    Diagnosis Date Noted POA    PRINCIPAL PROBLEM:  Small bowel obstruction [K56.609] 08/30/2022 Yes    UTI (urinary tract infection) [N39.0] 05/22/2022 Yes    Colostomy in place [Z93.3] 09/14/2020 Not Applicable    Coronary artery disease [I25.10] 08/14/2020 Yes    Chronic anemia [D64.9] 08/14/2020 Yes    Paroxysmal atrial fibrillation [I48.0] 12/28/2019 Yes     Chronic    Hypertension [I10] 12/05/2016 Yes    Acquired hypothyroidism [E03.9] 08/18/2016 Yes     Chronic    Epilepsy [G40.909] 08/18/2016 Yes     Chronic      Problems Resolved During this Admission:     S/p parastomal hernia repair, primary  Diet as tolerated  Colace  Bowel function good  OOB as much as possible  Ok from my standpoint for discharge   Follow up with me in a week    Chris Johnson MD  General Surgery  [facility] - Telemetry

## 2022-09-05 NOTE — PT/OT/SLP PROGRESS
Physical Therapy Treatment    Patient Name:  Annette Garcia   MRN:  [id number]    Recommendations:     Discharge Recommendations:  nursing facility, basic (with part B therapy services)   Discharge Equipment Recommendations: none   Barriers to discharge:  decreased functional mobility    Assessment:     Annette Garcia is a 86 y.o. female admitted with a medical diagnosis of Small bowel obstruction.  She presents with the following impairments/functional limitations:  weakness, gait instability, decreased upper extremity function, impaired cardiopulmonary response to activity, decreased ROM, impaired endurance, impaired balance, decreased lower extremity function, impaired joint extensibility, decreased safety awareness, impaired self care skills, impaired functional mobility, pain. Pt would benefit from skilled PT acute care services and part B.     Rehab Prognosis:  Fair ; patient would benefit from acute skilled PT services to address these deficits and reach maximum level of function.    Recent Surgery: Procedure(s) (LRB):  REPAIR, HERNIA, INCISIONAL OR VENTRAL, WITHOUT HISTORY OF PRIOR REPAIR (N/A) 4 Days Post-Op    Plan:     During this hospitalization, patient to be seen 3 x/week to address the identified rehab impairments via gait training, therapeutic activities, therapeutic exercises, neuromuscular re-education and progress toward the following goals:    Plan of Care Expires:  09/06/22    Subjective     Chief Complaint: none  Patient/Family Comments/goals: go back home to NH  Pain/Comfort:  Pain Rating 1:  (aching in legs but not graded)  Pain Addressed 1: Reposition, Distraction, Cessation of Activity      Objective:     Communicated with boy prior to session.  Patient found HOB elevated with peripheral IV, colostomy upon PT entry to room.     General Precautions: Standard, fall   Orthopedic Precautions:N/A   Braces:    Respiratory Status: Room air     Functional Mobility:  Pt went supine to side lying  with min assist and required mod assist for side lying to sit.  Pt unable to go from sit to stand but scooted 1' to the right very slowly with mod assist.        AM-PAC 6 CLICK MOBILITY  Turning over in bed (including adjusting bedclothes, sheets and blankets)?: 2  Sitting down on and standing up from a chair with arms (e.g., wheelchair, bedside commode, etc.): 2  Moving from lying on back to sitting on the side of the bed?: 2  Moving to and from a bed to a chair (including a wheelchair)?: 1  Need to walk in hospital room?: 1  Climbing 3-5 steps with a railing?: 1  Basic Mobility Total Score: 9       Therapeutic Activities and Exercises:   Pt performed BLE AROM LAQ 10 x 2, AP x 10 and hip flexion 10 x 2    Patient left HOB elevated with all lines intact, call button in reach, bed alarm on, and nurse notified..    GOALS:   Multidisciplinary Problems       Physical Therapy Goals          Problem: Physical Therapy    Goal Priority Disciplines Outcome Goal Variances Interventions   Physical Therapy Goal     PT, PT/OT Ongoing, Progressing     Description: Goals to be met by: 10/4/22     Patient will increase functional independence with mobility by performin. Supine to sit with Moderate Assistance  2. Sit to supine with Moderate Assistance  3. Sit to stand transfer with Moderate Assistance  4. Bed to chair transfer with Minimal Assistance using Rolling Walker  5. Gait  x 25 feet with Minimal Assistance using Rolling Walker.                          Time Tracking:     PT Received On: 22  PT Start Time: 1331     PT Stop Time: 1355  PT Total Time (min): 24 min     Billable Minutes: Therapeutic Activity 24    Treatment Type: Treatment  PT/PTA: PT     PTA Visit Number: 0     2022

## 2022-09-05 NOTE — PLAN OF CARE
Problem: Adult Inpatient Plan of Care  Goal: Plan of Care Review  Outcome: Ongoing, Progressing  Pt not in distress. NSR on tele, true no alarm noted. Safety maintained at all times. Call bell within hand. Bed alarm on. Will continue to monitor.

## 2022-09-05 NOTE — SUBJECTIVE & OBJECTIVE
Interval History:awake and alert, s/p primary parastomal hernia repair for high-grade SBO within her parastomal hernia on 9/1. Ostomy with some stool, reported big bowel movement yesterday. No nausea, tolerating diet . Stool softener    Appreciates GI rec's   Elevated blood pressure:  Status schedule IV hydralazine until able to tolerate p.o- resume Po bp meds    Replace K   Possible discharge tomorrow    Review of Systems   Constitutional:  Negative for fever.   Respiratory:  Negative for shortness of breath.    Gastrointestinal:  Negative for abdominal distention, abdominal pain, constipation, diarrhea, nausea and vomiting.   Neurological:  Positive for weakness.   Objective:     Vital Signs (Most Recent):  Temp: 98.4 °F (36.9 °C) (09/05/22 0740)  Pulse: 87 (09/05/22 0740)  Resp: 16 (09/05/22 0740)  BP: 137/63 (09/05/22 0740)  SpO2: (!) 94 % (09/05/22 0740)   Vital Signs (24h Range):  Temp:  [98.3 °F (36.8 °C)-98.7 °F (37.1 °C)] 98.4 °F (36.9 °C)  Pulse:  [78-97] 87  Resp:  [16-18] 16  SpO2:  [90 %-97 %] 94 %  BP: (137-186)/() 137/63     Weight: 73.5 kg (162 lb 0.6 oz)  Body mass index is 33.87 kg/m².    Intake/Output Summary (Last 24 hours) at 9/5/2022 0953  Last data filed at 9/5/2022 0500  Gross per 24 hour   Intake 0 ml   Output 2030 ml   Net -2030 ml        Physical Exam  Vitals and nursing note reviewed.   Constitutional:       Appearance: Normal appearance. She is obese. She is not ill-appearing, toxic-appearing or diaphoretic.   HENT:      Head: Normocephalic and atraumatic.      Nose:      Comments: NG tube in place     Mouth/Throat:      Mouth: Mucous membranes are dry.   Eyes:      Pupils: Pupils are equal, round, and reactive to light.   Cardiovascular:      Rate and Rhythm: Normal rate and regular rhythm.      Pulses: Normal pulses.      Heart sounds: Normal heart sounds.   Pulmonary:      Effort: Pulmonary effort is normal. No respiratory distress.      Breath sounds: Normal breath sounds.    Abdominal:      General: The ostomy site is clean. There is no distension.      Palpations: Abdomen is soft.      Tenderness: There is no abdominal tenderness.      Comments: Left colostomy with mild blood drainage, no gas or stool  Bowel sounds absent   Musculoskeletal:         General: No swelling. Normal range of motion.      Cervical back: Normal range of motion.   Skin:     General: Skin is warm.      Capillary Refill: Capillary refill takes 2 to 3 seconds.      Comments: Skin irritation noted around ostomy site   Neurological:      General: No focal deficit present.      Mental Status: She is alert and oriented to person, place, and time. Mental status is at baseline.   Psychiatric:         Mood and Affect: Mood normal.         Behavior: Behavior normal.       Significant Labs: ABGs: No results for input(s): PH, PCO2, HCO3, POCSATURATED, BE, TOTALHB, COHB, METHB, O2HB, POCFIO2, PO2 in the last 48 hours.  Blood Culture: No results for input(s): LABBLOO in the last 48 hours.  CBC:   Recent Labs   Lab 09/05/22  0556   WBC 6.29   HGB 9.8*   HCT 29.2*          CMP:   Recent Labs   Lab 09/05/22  0556   *   K 3.5   CL 92*   CO2 29      BUN 12   CREATININE 0.6   CALCIUM 8.2*   ANIONGAP 9       Lactic Acid: No results for input(s): LACTATE in the last 48 hours.  Lipase: No results for input(s): LIPASE in the last 48 hours.  Lipid Panel: No results for input(s): CHOL, HDL, LDLCALC, TRIG, CHOLHDL in the last 48 hours.  Magnesium:   No results for input(s): MG in the last 48 hours.    Troponin: No results for input(s): TROPONINI in the last 48 hours.  TSH: No results for input(s): TSH in the last 4320 hours.  Urine Culture: No results for input(s): LABURIN in the last 48 hours.  Urine Studies: No results for input(s): COLORU, APPEARANCEUA, PHUR, SPECGRAV, PROTEINUA, GLUCUA, KETONESU, BILIRUBINUA, OCCULTUA, NITRITE, UROBILINOGEN, LEUKOCYTESUR, RBCUA, WBCUA, BACTERIA, SQUAMEPITHEL, HYALINECASTS in the  last 48 hours.    Invalid input(s): GEMMA    Significant Imaging: I have reviewed all pertinent imaging results/findings within the past 24 hours.

## 2022-09-05 NOTE — PLAN OF CARE
Problem: Physical Therapy  Goal: Physical Therapy Goal  Description: Goals to be met by: 10/4/22     Patient will increase functional independence with mobility by performin. Supine to sit with Moderate Assistance  2. Sit to supine with Moderate Assistance  3. Sit to stand transfer with Moderate Assistance  4. Bed to chair transfer with Minimal Assistance using Rolling Walker  5. Gait  x 25 feet with Minimal Assistance using Rolling Walker.     Outcome: Ongoing, Progressing

## 2022-09-06 VITALS
TEMPERATURE: 96 F | HEIGHT: 58 IN | OXYGEN SATURATION: 99 % | HEART RATE: 77 BPM | DIASTOLIC BLOOD PRESSURE: 62 MMHG | BODY MASS INDEX: 34.02 KG/M2 | RESPIRATION RATE: 15 BRPM | WEIGHT: 162.06 LBS | SYSTOLIC BLOOD PRESSURE: 118 MMHG

## 2022-09-06 LAB — SARS-COV-2 RDRP RESP QL NAA+PROBE: NEGATIVE

## 2022-09-06 PROCEDURE — U0002 COVID-19 LAB TEST NON-CDC: HCPCS | Performed by: FAMILY MEDICINE

## 2022-09-06 PROCEDURE — 97530 THERAPEUTIC ACTIVITIES: CPT

## 2022-09-06 PROCEDURE — A4216 STERILE WATER/SALINE, 10 ML: HCPCS | Performed by: STUDENT IN AN ORGANIZED HEALTH CARE EDUCATION/TRAINING PROGRAM

## 2022-09-06 PROCEDURE — 94761 N-INVAS EAR/PLS OXIMETRY MLT: CPT

## 2022-09-06 PROCEDURE — 25000003 PHARM REV CODE 250: Performed by: STUDENT IN AN ORGANIZED HEALTH CARE EDUCATION/TRAINING PROGRAM

## 2022-09-06 PROCEDURE — 99900035 HC TECH TIME PER 15 MIN (STAT)

## 2022-09-06 PROCEDURE — 97535 SELF CARE MNGMENT TRAINING: CPT

## 2022-09-06 PROCEDURE — 63600175 PHARM REV CODE 636 W HCPCS: Performed by: STUDENT IN AN ORGANIZED HEALTH CARE EDUCATION/TRAINING PROGRAM

## 2022-09-06 PROCEDURE — 25000003 PHARM REV CODE 250: Performed by: FAMILY MEDICINE

## 2022-09-06 RX ORDER — PHENOBARBITAL 97.2 MG/1
97.2 TABLET ORAL DAILY
Qty: 90 TABLET | Refills: 0 | Status: ON HOLD | OUTPATIENT
Start: 2022-09-06 | End: 2023-02-16 | Stop reason: SDUPTHER

## 2022-09-06 RX ORDER — MICONAZOLE NITRATE 2 %
POWDER (GRAM) TOPICAL 2 TIMES DAILY
Qty: 85 G | Refills: 0 | Status: SHIPPED | OUTPATIENT
Start: 2022-09-06 | End: 2023-02-14

## 2022-09-06 RX ORDER — OXYCODONE HYDROCHLORIDE 5 MG/1
5 TABLET ORAL
Qty: 20 TABLET | Refills: 0 | Status: SHIPPED | OUTPATIENT
Start: 2022-09-06 | End: 2022-09-06 | Stop reason: SDUPTHER

## 2022-09-06 RX ORDER — OXYCODONE HYDROCHLORIDE 5 MG/1
5 TABLET ORAL
Qty: 20 TABLET | Refills: 0 | Status: ON HOLD | OUTPATIENT
Start: 2022-09-06 | End: 2022-10-17 | Stop reason: HOSPADM

## 2022-09-06 RX ORDER — MICONAZOLE NITRATE 2 %
POWDER (GRAM) TOPICAL 2 TIMES DAILY
Status: DISCONTINUED | OUTPATIENT
Start: 2022-09-06 | End: 2022-09-06 | Stop reason: HOSPADM

## 2022-09-06 RX ADMIN — APIXABAN 2.5 MG: 2.5 TABLET, FILM COATED ORAL at 08:09

## 2022-09-06 RX ADMIN — LOSARTAN POTASSIUM 100 MG: 50 TABLET, FILM COATED ORAL at 08:09

## 2022-09-06 RX ADMIN — LEVOTHYROXINE SODIUM 125 MCG: 75 TABLET ORAL at 05:09

## 2022-09-06 RX ADMIN — PRAVASTATIN SODIUM 20 MG: 10 TABLET ORAL at 08:09

## 2022-09-06 RX ADMIN — CEFEPIME 2 G: 2 INJECTION, POWDER, FOR SOLUTION INTRAVENOUS at 05:09

## 2022-09-06 RX ADMIN — Medication 10 ML: at 01:09

## 2022-09-06 RX ADMIN — POLYETHYLENE GLYCOL 3350 17 G: 17 POWDER, FOR SOLUTION ORAL at 08:09

## 2022-09-06 RX ADMIN — CEFEPIME 2 G: 2 INJECTION, POWDER, FOR SOLUTION INTRAVENOUS at 01:09

## 2022-09-06 RX ADMIN — Medication 10 ML: at 05:09

## 2022-09-06 RX ADMIN — MICONAZOLE NITRATE: 20 POWDER TOPICAL at 12:09

## 2022-09-06 RX ADMIN — METOPROLOL TARTRATE 50 MG: 50 TABLET, FILM COATED ORAL at 08:09

## 2022-09-06 RX ADMIN — Medication 800 MG: at 08:09

## 2022-09-06 NOTE — PLAN OF CARE
Problem: Adult Inpatient Plan of Care  Goal: Plan of Care Review  Outcome: Ongoing, Progressing  Pt not in distress. NSR on tele, no true alarm noted. Safety maintained at all times. Call bell within hand. Bed alarm on.  Will continue to monitor.

## 2022-09-06 NOTE — PLAN OF CARE
Problem: Physical Therapy  Goal: Physical Therapy Goal  Description: Goals to be met by: 10/4/22     Patient will increase functional independence with mobility by performin. Supine to sit with Moderate Assistance  2. Sit to supine with Moderate Assistance  3. Sit to stand transfer with Moderate Assistance  4. Bed to chair transfer with Minimal Assistance using Rolling Walker  5. Gait  x 25 feet with Minimal Assistance using Rolling Walker.     Outcome: Ongoing, Progressing     Pt transferred supine>sit with ModA. Pt sat EOB ~20 mins with SBA-SPV and table placed in front. Pt using R UE on bed rail for balance. Pt reporting dizziness and beginning to lean to the R side after prolonged sitting. Pt returned supine and symptoms resolved. Rash noted under R breast and pt reporting itchiness. Nsg and MD notified of dizziness, BP, and rash. Continue to progress pt as able.        22 1015 22 1016   Vital Signs   BP (!) 174/72 (!) 143/63   MAP (mmHg)  --  91   BP Location Left arm Left arm   Patient Position Lying Lying

## 2022-09-06 NOTE — DISCHARGE SUMMARY
[facility] Medicine  Discharge Summary      Patient Name: Annette Garcia  MRN: [id number]  Patient Class: IP- Inpatient  Admission Date: 8/29/2022  Hospital Length of Stay: 7 days  Discharge Date and Time: 9/6/2022  4:38 PM  Attending Physician: No att. providers found   Discharging Provider: Alice Barker MD  Primary Care Provider: Jose Valles MD      HPI:   Annette Garcia is an 86-year-old female with a history of history of CAD, disorder of kidney and ureter, HTN, lone atrial fibrillation, seizures, hypothyroidism who presented to the ED from [facility] for evaluation of recurrent episodes of vomiting as well as nausea today. She has multiple medical comorbidities including ostomy. She presented from [facility] to the ED for evaluation of multiple (she reports 40) episodes of vomiting yesterday. She associated vomiting with nausea, abdominal pain, lack of appetite, and fatigue. She has a noted LLQ colostomy with formed stool in pouch, no blood noted. She says her abdominal pain is currently gone however it is achy and intermittent in nature. She denies chest pain, SOB, fever, chills, changes in bowel or bladder changes, urinary frequency, burning, or odor. Of note she was admitted 5/20/22 for partial bowel obstruction.    In the ED: labs pretty unremarkable. UA with noted UTI. CT abdomen with High-grade small bowel obstruction with a transition point within a left lower quadrant parastomal hernia.  No evidence of bowel wall thickening, pneumatosis or gross perforation. 2. Distal esophageal wall thickening and luminal fluid which may be related to reflux esophagitis.  Rectum further evaluation with EGD to exclude and underlying lesion if not already performed. 3. Tree-in-bud nodularity in the right middle lobe with mild bronchiectasis in the middle lobe and lingula.  Findings are concerning for an atypical infectious process such as MAC.  Mild aspiration not  excluded. CXR with No acute cardiopulmonary abnormality. She was given IVF, pain medication, started on aztreonam for UTI. Admitted to [facility] for further care.      Procedure(s) (LRB):  REPAIR, HERNIA, INCISIONAL OR VENTRAL, WITHOUT HISTORY OF PRIOR REPAIR (N/A)      Hospital Course:   No notes on file     Goals of Care Treatment Preferences:  Code Status: Full Code    Health care agent: Beatris  Health care agent number: No value filed.    Living Will: Yes              Consults:   Consults (From admission, onward)        Status Ordering Provider     Inpatient virtual consult to Hospital Medicine  Once        Provider:  (Not yet assigned)    Acknowledged INNOCENT-PAULA PA     IP consult to case management  Once        Provider:  (Not yet assigned)    Completed CLARISSA VELEZ     General Surgery  Once        Provider:  (Not yet assigned)    Completed ELIA PAUL          * Small bowel obstruction  -c/o N/V and abdominal pain  -CT abdomen with High-grade small bowel obstruction with a transition point within a left lower quadrant parastomal hernia. No evidence of bowel wall thickening, pneumatosis or gross perforation.  -mild distention and tender to palpation around colostomy noted. Colostomy with brown stool in pouch  -NPO  -NGT to LIWS  -IVF; IV antibiotics  -consult general surgery- s/p primary parastomal hernia repair for high-grade SBO within her parastomal hernia on 9/1. FU with surgery in 1 week post discharge      UTI (urinary tract infection)  -UA positive  -On aztreonam     Colostomy in place  -Noted  -medium amount formed brown stool noted at admission, none today  -site intact with irritation around--consult wound care      Chronic anemia  -stable  -monitor      Coronary artery disease  -Resume home statin, BB  -Resume home eliquis after surgery      Paroxysmal atrial fibrillation  Patient with Paroxysmal (<7 days) atrial fibrillation which is controlled currently  with Beta Blocker. Patient is currently in sinus rhythm.[id number] Score: 4. HASBLED Score: Unable to calculate. Anticoagulation indicated. Anticoagulation done with home eliquis.  -general surgery will be planning OR later this week; holding anticoagulation      Hypertension  Starts schedule IV hydralazine until patient able to tolerate p.o.      Acquired hypothyroidism  -continue home synthroid      Epilepsy  -continue home trileptal and phenobarbital        Final Active Diagnoses:    Diagnosis Date Noted POA    PRINCIPAL PROBLEM:  Small bowel obstruction [K56.609] 08/30/2022 Yes    UTI (urinary tract infection) [N39.0] 05/22/2022 Yes    Colostomy in place [Z93.3] 09/14/2020 Not Applicable    Coronary artery disease [I25.10] 08/14/2020 Yes    Chronic anemia [D64.9] 08/14/2020 Yes    Paroxysmal atrial fibrillation [I48.0] 12/28/2019 Yes     Chronic    Hypertension [I10] 12/05/2016 Yes    Acquired hypothyroidism [E03.9] 08/18/2016 Yes     Chronic    Epilepsy [G40.909] 08/18/2016 Yes     Chronic      Problems Resolved During this Admission:       Discharged Condition: stable    Disposition: Home-Health Care [facility] [place] [facility]    Follow Up:   Follow-up Information     [facility] Follow up.    Specialties: Nursing Home Agency, SNF Agency  Why: Nursing Home  Contact information:  [address] [place] [address] [phone]             Chris Johnson MD Follow up in 1 week(s).    Specialties: Surgery, General Surgery  Contact information:  [address]  [address]  [place] [address] [phone]                       Patient Instructions:      Diet Cardiac     Activity as tolerated           Pending Diagnostic Studies:     None         Medications:  Reconciled Home Medications:      Medication List      START taking these medications    miconazole NITRATE 2 % 2 % top powder  Commonly known as: MICOTIN  Apply topically 2 (two) times daily. Apply to bilateral under breast     oxyCODONE 5 MG  immediate release tablet  Commonly known as: ROXICODONE  Take 1 tablet (5 mg total) by mouth every 3 (three) hours as needed (moderate pain 2-5/10 pain scale).        CHANGE how you take these medications    PHENobarbitaL 97.2 MG tablet  Take 1 tablet (97.2 mg total) by mouth once daily at 6am.  What changed: when to take this     polyethylene glycol 17 gram Pwpk  Commonly known as: GLYCOLAX  Take 17 g by mouth once daily.  What changed:   · when to take this  · reasons to take this  · additional instructions        CONTINUE taking these medications    acetaminophen 325 MG tablet  Commonly known as: TYLENOL  Take 2 tablets (650 mg total) by mouth every 4 (four) hours as needed for Pain.     apixaban 2.5 mg Tab  Commonly known as: ELIQUIS  Take 1 tablet (2.5 mg total) by mouth 2 (two) times daily.     calcium-vitamin D 600 mg-10 mcg (400 unit) Tab  Take 1 tablet by mouth once daily.     CHLORASEPTIC 0.5 % Spra  Generic drug: phenoL  by Mucous Membrane route 2 (two) times daily as needed (sore throat).     levothyroxine 125 MCG tablet  Commonly known as: SYNTHROID  TAKE 1 TABLET (125 MCG TOTAL) BY MOUTH ONCE DAILY.     losartan 50 MG tablet  Commonly known as: COZAAR  Take 1 tablet (50 mg total) by mouth once daily.     magnesium oxide 400 mg (241.3 mg magnesium) tablet  Commonly known as: MAG-OX  Take 2 tablets (800 mg total) by mouth once daily.     metoprolol tartrate 25 MG tablet  Commonly known as: LOPRESSOR  Take 1 tablet (25 mg total) by mouth 2 (two) times daily.     ondansetron 8 MG tablet  Commonly known as: ZOFRAN  Take 8 mg by mouth every 6 (six) hours as needed for Nausea (and vomiting).     OXcarbazepine 150 MG Tab  Commonly known as: TRILEPTAL  Take 150 mg by mouth nightly.     pravastatin 20 MG tablet  Commonly known as: PRAVACHOL  Take 20 mg by mouth once daily.            Indwelling Lines/Drains at time of discharge:   Lines/Drains/Airways     Drain  Duration                Colostomy 10/29/19 1200  LLQ 1043 days    Female External Urinary Catheter 09/01/22 2158 4 days                Time spent on the discharge of patient: 35 minutes         Alice Barker MD  Department of [facility] Medicine  [facility]

## 2022-09-06 NOTE — NURSING
Patient for discharge today. Patient A&Ox4. Report given to nurse Violet. Tele and PIV removed. Awaiting for transport services to come and  patient.

## 2022-09-06 NOTE — PT/OT/SLP PROGRESS
"Physical Therapy Treatment    Patient Name:  Annette Garcia   MRN:  [id number]    Recommendations:     Discharge Recommendations:  nursing facility, basic (part B therapy services)   Discharge Equipment Recommendations: none   Barriers to discharge:  impaired functional mobility    Assessment:     Annette Garcia is a 87 y.o. female admitted with a medical diagnosis of Small bowel obstruction.  She presents with the following impairments/functional limitations:  weakness, gait instability, impaired endurance, decreased upper extremity function, decreased lower extremity function, impaired functional mobility, pain, decreased safety awareness, impaired self care skills, impaired balance, decreased ROM, impaired cardiopulmonary response to activity, impaired skin, edema, impaired joint extensibility .Pt transferred supine>sit with ModA. Pt sat EOB ~20 mins with SBA-SPV and table placed in front. Pt using R UE on bed rail for balance. Pt reporting dizziness and beginning to lean to the R side after prolonged sitting. Pt returned supine and symptoms resolved. Rash noted under R breast and pt reporting itchiness. Nsg and MD notified of dizziness, BP, and rash. Continue to progress pt as able.     Rehab Prognosis: Good; patient would benefit from acute skilled PT services to address these deficits and reach maximum level of function.    Recent Surgery: Procedure(s) (LRB):  REPAIR, HERNIA, INCISIONAL OR VENTRAL, WITHOUT HISTORY OF PRIOR REPAIR (N/A) 5 Days Post-Op    Plan:     During this hospitalization, patient to be seen 3 x/week to address the identified rehab impairments via gait training, therapeutic activities, therapeutic exercises, neuromuscular re-education and progress toward the following goals:    Plan of Care Expires:  10/06/22    Subjective     Chief Complaint: Itchiness and mild pain under R breast  Patient/Family Comments/goals: Pt wishing to brush teeth; "tired"  Pain/Comfort:  Pain Rating 1: " 2/10  Location - Side 1: Right  Location - Orientation 1: upper  Location 1: abdomen  Pain Addressed 1: Reposition, Distraction, Nurse notified  Pain Rating Post-Intervention 1:  (did not rate)      Objective:     Communicated with nsg prior to session.  Patient found HOB elevated with peripheral IV, colostomy, PureWick upon PT entry to room.     General Precautions: Standard, fall   Orthopedic Precautions:N/A   Braces: N/A  Respiratory Status: Room air     Functional Mobility:  Bed Mobility:     Rolling Left:  moderate assistance  Scooting: minimum assistance and moderate assistance scooting EOB  Supine to Sit: moderate assistance  Sit to Supine: maximal assistance      AM-PAC 6 CLICK MOBILITY  Turning over in bed (including adjusting bedclothes, sheets and blankets)?: 2  Sitting down on and standing up from a chair with arms (e.g., wheelchair, bedside commode, etc.): 1  Moving from lying on back to sitting on the side of the bed?: 2  Moving to and from a bed to a chair (including a wheelchair)?: 1  Need to walk in hospital room?: 1  Climbing 3-5 steps with a railing?: 1  Basic Mobility Total Score: 8       Therapeutic Activities and Exercises:   Pt agreeable to sit EOB and reports wanting to brush her teeth.  Pt requires verbal and tactile cues for hand bed rail and sequencing.  Pt provided with bed rail for R UE support in sitting.  Pt sat EOB ~20 mins with SBA-SPV and table placed in front.   Performed self-care activities and G&H - washing face with warm washcloth and oral hygiene.   Pt reporting dizziness and beginning to lean to the R side after prolonged sitting.   Pt returned supine and symptoms resolved.   Rash noted under R breast and pt reporting itchiness.   Supine therex: X 20 reps B AP and heel slides   SCD's replaced  Isak and MD Dr. Bauer notified of dizziness, BP, and rash - MD and nsg in room at end of session to observe  Unable to attempt sit>stand 2/2 pt reporting dizziness and needing to  return supine..       22 1015 22 1016   Vital Signs   BP (!) 174/72 (!) 143/63   MAP (mmHg)  --  91   BP Location Left arm Left arm   Patient Position Lying Lying           Patient left HOB elevated with all lines intact, call button in reach, bed alarm on, and nsg and MD notified..    GOALS:   Multidisciplinary Problems       Physical Therapy Goals          Problem: Physical Therapy    Goal Priority Disciplines Outcome Goal Variances Interventions   Physical Therapy Goal     PT, PT/OT Ongoing, Progressing     Description: Goals to be met by: 10/4/22     Patient will increase functional independence with mobility by performin. Supine to sit with Moderate Assistance  2. Sit to supine with Moderate Assistance  3. Sit to stand transfer with Moderate Assistance  4. Bed to chair transfer with Minimal Assistance using Rolling Walker  5. Gait  x 25 feet with Minimal Assistance using Rolling Walker.                          Time Tracking:     PT Received On: 22  PT Start Time: 942     PT Stop Time: 1021  PT Total Time (min): 39 min     Billable Minutes: Therapeutic Activity 23  Self-care 16    Treatment Type: Treatment  PT/PTA: PT     PTA Visit Number: 0     2022

## 2022-09-06 NOTE — ASSESSMENT & PLAN NOTE
Patient with Paroxysmal (<7 days) atrial fibrillation which is controlled currently with Beta Blocker. Patient is currently in sinus rhythm.[id number] Score: 4. HASBLED Score: Unable to calculate. Anticoagulation indicated. Anticoagulation done with home eliquis.  -general surgery will be planning OR later this week; holding anticoagulation

## 2022-09-06 NOTE — SUBJECTIVE & OBJECTIVE
Interval History:awake and alert, s/p primary parastomal hernia repair for high-grade SBO within her parastomal hernia on 9/1. Ostomy with some stool, tolerating diet . Appreciates surgery rec's     Pending SNF placement    Review of Systems   Constitutional:  Negative for fever.   Respiratory:  Negative for shortness of breath.    Gastrointestinal:  Negative for abdominal distention, abdominal pain, constipation, diarrhea, nausea and vomiting.   Neurological:  Positive for weakness.   Objective:     Vital Signs (Most Recent):  Temp: 98.3 °F (36.8 °C) (09/06/22 0442)  Pulse: 74 (09/06/22 0442)  Resp: 16 (09/06/22 0442)  BP: (!) 153/69 (09/06/22 0442)  SpO2: 96 % (09/06/22 0442)   Vital Signs (24h Range):  Temp:  [96.7 °F (35.9 °C)-98.5 °F (36.9 °C)] 98.3 °F (36.8 °C)  Pulse:  [74-87] 74  Resp:  [15-20] 16  SpO2:  [92 %-96 %] 96 %  BP: (137-169)/(63-71) 153/69     Weight: 73.5 kg (162 lb 0.6 oz)  Body mass index is 33.87 kg/m².    Intake/Output Summary (Last 24 hours) at 9/6/2022 0733  Last data filed at 9/6/2022 0535  Gross per 24 hour   Intake --   Output 2175 ml   Net -2175 ml        Physical Exam  Vitals and nursing note reviewed.   Constitutional:       Appearance: Normal appearance. She is obese. She is not ill-appearing, toxic-appearing or diaphoretic.   HENT:      Head: Normocephalic and atraumatic.      Nose:      Comments: NG tube in place     Mouth/Throat:      Mouth: Mucous membranes are dry.   Eyes:      Pupils: Pupils are equal, round, and reactive to light.   Cardiovascular:      Rate and Rhythm: Normal rate and regular rhythm.      Pulses: Normal pulses.      Heart sounds: Normal heart sounds.   Pulmonary:      Effort: Pulmonary effort is normal. No respiratory distress.      Breath sounds: Normal breath sounds.   Abdominal:      General: The ostomy site is clean. There is no distension.      Palpations: Abdomen is soft.      Tenderness: There is no abdominal tenderness.      Comments: Left colostomy  with mild blood drainage, no gas or stool  Bowel sounds absent   Musculoskeletal:         General: No swelling. Normal range of motion.      Cervical back: Normal range of motion.   Skin:     General: Skin is warm.      Capillary Refill: Capillary refill takes 2 to 3 seconds.      Comments: Skin irritation noted around ostomy site   Neurological:      General: No focal deficit present.      Mental Status: She is alert and oriented to person, place, and time. Mental status is at baseline.   Psychiatric:         Mood and Affect: Mood normal.         Behavior: Behavior normal.       Significant Labs: ABGs: No results for input(s): PH, PCO2, HCO3, POCSATURATED, BE, TOTALHB, COHB, METHB, O2HB, POCFIO2, PO2 in the last 48 hours.  Blood Culture: No results for input(s): LABBLOO in the last 48 hours.  CBC:   Recent Labs   Lab 09/05/22  0556   WBC 6.29   HGB 9.8*   HCT 29.2*          CMP:   Recent Labs   Lab 09/05/22  0556   *   K 3.5   CL 92*   CO2 29      BUN 12   CREATININE 0.6   CALCIUM 8.2*   ANIONGAP 9       Lactic Acid: No results for input(s): LACTATE in the last 48 hours.  Lipase: No results for input(s): LIPASE in the last 48 hours.  Lipid Panel: No results for input(s): CHOL, HDL, LDLCALC, TRIG, CHOLHDL in the last 48 hours.  Magnesium:   No results for input(s): MG in the last 48 hours.    Troponin: No results for input(s): TROPONINI in the last 48 hours.  TSH: No results for input(s): TSH in the last 4320 hours.  Urine Culture: No results for input(s): LABURIN in the last 48 hours.  Urine Studies: No results for input(s): COLORU, APPEARANCEUA, PHUR, SPECGRAV, PROTEINUA, GLUCUA, KETONESU, BILIRUBINUA, OCCULTUA, NITRITE, UROBILINOGEN, LEUKOCYTESUR, RBCUA, WBCUA, BACTERIA, SQUAMEPITHEL, HYALINECASTS in the last 48 hours.    Invalid input(s): WRIGHTSUR    Significant Imaging: I have reviewed all pertinent imaging results/findings within the past 24 hours.

## 2022-09-06 NOTE — NURSING
Future Appointments   Date Time Provider Department Center   9/14/2022  2:20 PM [facility]        Follow-up Information       [facility] Follow up.    Specialties: Nursing Home Agency, SNF Agency  Why: Nursing Home  Contact information:  [address]  [place] [address] [phone]               Chris Johnson MD Follow up in 1 week(s).    Specialties: Surgery, General Surgery  Contact information:  [address]  [address]  [place] [address] [phone]

## 2022-09-06 NOTE — PLAN OF CARE
[facility]    FACILITY TRANSFER ORDERS      Patient Name: Annette Garcia  YOB: 1935    PCP: Jose Valles MD   PCP Address: [address] / [place] [address]  PCP Phone Number: [phone]  PCP Fax: [phone]    Encounter Date: 09/06/2022    Admit to: [facility]    Vital Signs:  Routine    Diagnoses:   Active Hospital Problems    Diagnosis  POA    *Small bowel obstruction [K56.609]  Yes    UTI (urinary tract infection) [N39.0]  Yes    Colostomy in place [Z93.3]  Not Applicable    Coronary artery disease [I25.10]  Yes    Chronic anemia [D64.9]  Yes    Paroxysmal atrial fibrillation [I48.0]  Yes     Chronic    Hypertension [I10]  Yes    Acquired hypothyroidism [E03.9]  Yes     Chronic    Epilepsy [G40.909]  Yes     Chronic     Epilepsy type unknown        Resolved Hospital Problems   No resolved problems to display.       Allergies:  Review of patient's allergies indicates:   Allergen Reactions    Adhesive Itching and Blisters    Penicillins Anaphylaxis    Tramadol Hives    Avelox [moxifloxacin] Rash     Facial and arm itching and redness. Pt states throat closes when given.    Amoxil [amoxicillin]     Aspridrox [aspirin, buffered]     Codeine Other (See Comments)     Throat swelling    Keflex [cephalexin]      Tolerated cefepime and cefazolin    Norvasc [amlodipine]     Red dye Hives    Robitussin [guaifenesin]     Sulfa (sulfonamide antibiotics)     Tylenol [acetaminophen]      Has reaction to Tylenol with red dye and unable to take Extra Strength Tylenol/ CAN ONLY TOLERATE REG STRENGTH TYLENOL    Vicks vaporub [camphor-eucalyptus oil-menthol]        Diet: cardiac diet    Activities: Activity as tolerated    Goals of Care Treatment Preferences:  Code Status: Full Code    Health care agent: [employer]  Health care agent number: No value filed.    Living Will: Yes            Nursing: per facility protocol       CONSULTS:    Physical Therapy to evaluate and treat.  and Occupational  Therapy to evaluate and treat.    MISCELLANEOUS CARE:  Colostomy Care: Empty bag every shift and PRN. Change and clean site every 48 hours.       Medications: Review discharge medications with patient and family and provide education.      Current Discharge Medication List        START taking these medications    Details   miconazole NITRATE 2 % (MICOTIN) 2 % top powder Apply topically 2 (two) times daily. Apply to bilateral under breast  Qty: 85 g, Refills: 0      oxyCODONE (ROXICODONE) 5 MG immediate release tablet Take 1 tablet (5 mg total) by mouth every 3 (three) hours as needed (moderate pain 2-5/10 pain scale).  Qty: 20 tablet, Refills: 0    Comments: Quantity prescribed more than 7 day supply? Yes, quantity medically necessary           CONTINUE these medications which have CHANGED    Details   PHENobarbitaL 97.2 MG tablet Take 1 tablet (97.2 mg total) by mouth once daily at 6am.  Qty: 90 tablet, Refills: 0           CONTINUE these medications which have NOT CHANGED    Details   acetaminophen (TYLENOL) 325 MG tablet Take 2 tablets (650 mg total) by mouth every 4 (four) hours as needed for Pain.  Refills: 0      apixaban (ELIQUIS) 2.5 mg Tab Take 1 tablet (2.5 mg total) by mouth 2 (two) times daily.  Qty:        calcium-vitamin D 600 mg(1,500mg) -400 unit Tab Take 1 tablet by mouth once daily.      levothyroxine (SYNTHROID) 125 MCG tablet TAKE 1 TABLET (125 MCG TOTAL) BY MOUTH ONCE DAILY.  Qty: 90 tablet, Refills: 3      losartan (COZAAR) 50 MG tablet Take 1 tablet (50 mg total) by mouth once daily.  Qty: 90 tablet, Refills: 3    Comments: .      magnesium oxide (MAG-OX) 400 mg (241.3 mg magnesium) tablet Take 2 tablets (800 mg total) by mouth once daily.  Refills: 0      metoprolol tartrate (LOPRESSOR) 25 MG tablet Take 1 tablet (25 mg total) by mouth 2 (two) times daily.  Qty: 60 tablet, Refills: 11    Comments: .      ondansetron (ZOFRAN) 8 MG tablet Take 8 mg by mouth every 6 (six) hours as needed for  Nausea (and vomiting).      OXcarbazepine (TRILEPTAL) 150 MG Tab Take 150 mg by mouth nightly.      pravastatin (PRAVACHOL) 20 MG tablet Take 20 mg by mouth once daily.      phenoL (CHLORASEPTIC) 0.5 % SprA by Mucous Membrane route 2 (two) times daily as needed (sore throat).      polyethylene glycol (GLYCOLAX) 17 gram PwPk Take 17 g by mouth once daily.  Qty: 30 packet, Refills: 5                Immunizations Administered as of 9/6/2022       No immunizations on file.            This patient has had both covid vaccinations    Some patients may experience side effects after vaccination.  These may include fever, headache, muscle or joint aches.  Most symptoms resolve with 24-48 hours and do not require urgent medical evaluation unless they persist for more than 72 hours or symptoms are concerning for an unrelated medical condition.          _________________________________  Alice Barker MD  09/06/2022

## 2022-09-06 NOTE — PLAN OF CARE
[facility] - Telemetry  Discharge Final Note    Primary Care Provider: Jose Valles MD    Expected Discharge Date: 9/6/2022    Pharmacist will go over home medications and reasons for medications. VN and bedside nurse to reiterate final discharge instructions.     Pending placement for return finalization. Awaiting report information to be received.      Final Discharge Note (most recent)       Final Note - 09/06/22 1232          Final Note    Assessment Type Final Discharge Note (P)      Anticipated Discharge Disposition Skilled Nursing Facility (P)      Hospital Resources/Appts/Education Provided Appointments scheduled and added to AVS;Post-Acute resouces added to AVS (P)         Post-Acute Status    Post-Acute Authorization Placement (P)      Post-Acute Placement Status Pending post-acute provider review/more information requested (P)    Orders sent to facility for review via [facility]. PENDING COVID RESULTS AND NARCOTIC SCRIPTS. MD AWARE OF NEED.    Discharge Delays PFC Arranged Transportation (P)    [employer] transportation set up for 255 pm . Pending order acceptance. Rapid Covid negative results, and narcotic script prior to transfer.                    Important Message from Medicare  Important Message from Medicare regarding Discharge Appeal Rights: Given to patient/caregiver, Explained to patient/caregiver, Signed/date by patient/caregiver     Date IMM was signed: 09/06/22  Time IMM was signed: 1121    Future Appointments   Date Time Provider Department Center   9/14/2022  2:20 PM [facility]         Contact Info [facility]   Specialty: Nursing Home Agency, SNF Agency    [address]  [place] [address]   Phone: [phone]       Next Steps: Follow up    Instructions: Nursing Home    Chris Johnson MD   Specialty: Surgery, General Surgery    [address]  [address]  [place] [address]   Phone: [phone]       Next Steps: Follow  up in 1 week(s)             Medication List        START taking these medications      oxyCODONE 5 MG immediate release tablet  Commonly known as: ROXICODONE  Take 1 tablet (5 mg total) by mouth every 3 (three) hours as needed (moderate pain 2-5/10 pain scale).     Remedy Phytoplex AntifungaL 2 % top powder  Generic drug: miconazole NITRATE 2 %  Apply topically 2 (two) times daily. Apply to bilateral under breast            CHANGE how you take these medications      PHENobarbitaL 97.2 MG tablet  Take 1 tablet (97.2 mg total) by mouth 2 (two) times daily.  What changed: when to take this     polyethylene glycol 17 gram Pwpk  Commonly known as: GLYCOLAX  Take 17 g by mouth once daily.  What changed:   when to take this  reasons to take this  additional instructions            CONTINUE taking these medications      acetaminophen 325 MG tablet  Commonly known as: TYLENOL  Take 2 tablets (650 mg total) by mouth every 4 (four) hours as needed for Pain.     apixaban 2.5 mg Tab  Commonly known as: ELIQUIS  Take 1 tablet (2.5 mg total) by mouth 2 (two) times daily.     calcium-vitamin D 600 mg-10 mcg (400 unit) Tab     CHLORASEPTIC 0.5 % Spra  Generic drug: phenoL     levothyroxine 125 MCG tablet  Commonly known as: SYNTHROID  TAKE 1 TABLET (125 MCG TOTAL) BY MOUTH ONCE DAILY.     losartan 50 MG tablet  Commonly known as: COZAAR  Take 1 tablet (50 mg total) by mouth once daily.     magnesium oxide 400 mg (241.3 mg magnesium) tablet  Commonly known as: MAG-OX  Take 2 tablets (800 mg total) by mouth once daily.     metoprolol tartrate 25 MG tablet  Commonly known as: LOPRESSOR  Take 1 tablet (25 mg total) by mouth 2 (two) times daily.     ondansetron 8 MG tablet  Commonly known as: ZOFRAN     OXcarbazepine 150 MG Tab  Commonly known as: TRILEPTAL     pravastatin 20 MG tablet  Commonly known as: PRAVACHOL               Where to Get Your Medications        These medications were sent to [facility]  [address]  [address], [place] [address]      Hours: Mon-Fri, 8a-5:30p Phone: [phone]   oxyCODONE 5 MG immediate release tablet  Remedy Phytoplex AntifungaL 2 % top powder

## 2022-09-06 NOTE — ASSESSMENT & PLAN NOTE
-c/o N/V and abdominal pain  -CT abdomen with High-grade small bowel obstruction with a transition point within a left lower quadrant parastomal hernia. No evidence of bowel wall thickening, pneumatosis or gross perforation.  -mild distention and tender to palpation around colostomy noted. Colostomy with brown stool in pouch  -NPO  -NGT to LIWS  -IVF; IV antibiotics  -consult general surgery- s/p primary parastomal hernia repair for high-grade SBO within her parastomal hernia on 9/1. FU with surgery in 1 week post discharge

## 2022-09-06 NOTE — PROGRESS NOTES
[facility] Medicine  Progress Note    Patient Name: Annette Garcia  MRN: [id number]  Patient Class: IP- Inpatient   Admission Date: 8/29/2022  Length of Stay: 7 days  Attending Physician: Alice Barker*  Primary Care Provider: Jose Valles MD        Subjective:     Principal Problem:Small bowel obstruction        HPI:  Annette Garcia is an 86-year-old female with a history of history of CAD, disorder of kidney and ureter, HTN, lone atrial fibrillation, seizures, hypothyroidism who presented to the ED from [facility] for evaluation of recurrent episodes of vomiting as well as nausea today. She has multiple medical comorbidities including ostomy. She presented from [facility] to the ED for evaluation of multiple (she reports 40) episodes of vomiting yesterday. She associated vomiting with nausea, abdominal pain, lack of appetite, and fatigue. She has a noted LLQ colostomy with formed stool in pouch, no blood noted. She says her abdominal pain is currently gone however it is achy and intermittent in nature. She denies chest pain, SOB, fever, chills, changes in bowel or bladder changes, urinary frequency, burning, or odor. Of note she was admitted 5/20/22 for partial bowel obstruction.    In the ED: labs pretty unremarkable. UA with noted UTI. CT abdomen with High-grade small bowel obstruction with a transition point within a left lower quadrant parastomal hernia.  No evidence of bowel wall thickening, pneumatosis or gross perforation. 2. Distal esophageal wall thickening and luminal fluid which may be related to reflux esophagitis.  Rectum further evaluation with EGD to exclude and underlying lesion if not already performed. 3. Tree-in-bud nodularity in the right middle lobe with mild bronchiectasis in the middle lobe and lingula.  Findings are concerning for an atypical infectious process such as MAC.  Mild aspiration not excluded. CXR with No acute cardiopulmonary  abnormality. She was given IVF, pain medication, started on aztreonam for UTI. Admitted to [facility] for further care.      Overview/Hospital Course:  No notes on file    Interval History:awake and alert, s/p primary parastomal hernia repair for high-grade SBO within her parastomal hernia on 9/1. Ostomy with some stool, tolerating diet . Appreciates surgery rec's     Pending SNF placement    Review of Systems   Constitutional:  Negative for fever.   Respiratory:  Negative for shortness of breath.    Gastrointestinal:  Negative for abdominal distention, abdominal pain, constipation, diarrhea, nausea and vomiting.   Neurological:  Positive for weakness.   Objective:     Vital Signs (Most Recent):  Temp: 98.3 °F (36.8 °C) (09/06/22 0442)  Pulse: 74 (09/06/22 0442)  Resp: 16 (09/06/22 0442)  BP: (!) 153/69 (09/06/22 0442)  SpO2: 96 % (09/06/22 0442)   Vital Signs (24h Range):  Temp:  [96.7 °F (35.9 °C)-98.5 °F (36.9 °C)] 98.3 °F (36.8 °C)  Pulse:  [74-87] 74  Resp:  [15-20] 16  SpO2:  [92 %-96 %] 96 %  BP: (137-169)/(63-71) 153/69     Weight: 73.5 kg (162 lb 0.6 oz)  Body mass index is 33.87 kg/m².    Intake/Output Summary (Last 24 hours) at 9/6/2022 0733  Last data filed at 9/6/2022 0535  Gross per 24 hour   Intake --   Output 2175 ml   Net -2175 ml        Physical Exam  Vitals and nursing note reviewed.   Constitutional:       Appearance: Normal appearance. She is obese. She is not ill-appearing, toxic-appearing or diaphoretic.   HENT:      Head: Normocephalic and atraumatic.      Nose:      Comments: NG tube in place     Mouth/Throat:      Mouth: Mucous membranes are dry.   Eyes:      Pupils: Pupils are equal, round, and reactive to light.   Cardiovascular:      Rate and Rhythm: Normal rate and regular rhythm.      Pulses: Normal pulses.      Heart sounds: Normal heart sounds.   Pulmonary:      Effort: Pulmonary effort is normal. No respiratory distress.      Breath sounds: Normal breath sounds.    Abdominal:      General: The ostomy site is clean. There is no distension.      Palpations: Abdomen is soft.      Tenderness: There is no abdominal tenderness.      Comments: Left colostomy with mild blood drainage, no gas or stool  Bowel sounds absent   Musculoskeletal:         General: No swelling. Normal range of motion.      Cervical back: Normal range of motion.   Skin:     General: Skin is warm.      Capillary Refill: Capillary refill takes 2 to 3 seconds.      Comments: Skin irritation noted around ostomy site   Neurological:      General: No focal deficit present.      Mental Status: She is alert and oriented to person, place, and time. Mental status is at baseline.   Psychiatric:         Mood and Affect: Mood normal.         Behavior: Behavior normal.       Significant Labs: ABGs: No results for input(s): PH, PCO2, HCO3, POCSATURATED, BE, TOTALHB, COHB, METHB, O2HB, POCFIO2, PO2 in the last 48 hours.  Blood Culture: No results for input(s): LABBLOO in the last 48 hours.  CBC:   Recent Labs   Lab 09/05/22  0556   WBC 6.29   HGB 9.8*   HCT 29.2*          CMP:   Recent Labs   Lab 09/05/22  0556   *   K 3.5   CL 92*   CO2 29      BUN 12   CREATININE 0.6   CALCIUM 8.2*   ANIONGAP 9       Lactic Acid: No results for input(s): LACTATE in the last 48 hours.  Lipase: No results for input(s): LIPASE in the last 48 hours.  Lipid Panel: No results for input(s): CHOL, HDL, LDLCALC, TRIG, CHOLHDL in the last 48 hours.  Magnesium:   No results for input(s): MG in the last 48 hours.    Troponin: No results for input(s): TROPONINI in the last 48 hours.  TSH: No results for input(s): TSH in the last 4320 hours.  Urine Culture: No results for input(s): LABURIN in the last 48 hours.  Urine Studies: No results for input(s): COLORU, APPEARANCEUA, PHUR, SPECGRAV, PROTEINUA, GLUCUA, KETONESU, BILIRUBINUA, OCCULTUA, NITRITE, UROBILINOGEN, LEUKOCYTESUR, RBCUA, WBCUA, BACTERIA, SQUAMEPITHEL, HYALINECASTS in the  last 48 hours.    Invalid input(s): GEMMA    Significant Imaging: I have reviewed all pertinent imaging results/findings within the past 24 hours.      Assessment/Plan:      * Small bowel obstruction  -c/o N/V and abdominal pain  -CT abdomen with High-grade small bowel obstruction with a transition point within a left lower quadrant parastomal hernia. No evidence of bowel wall thickening, pneumatosis or gross perforation.  -mild distention and tender to palpation around colostomy noted. Colostomy with brown stool in pouch  -NPO  -NGT to LIWS  -IVF; IV antibiotics  -consult general surgery- s/p primary parastomal hernia repair for high-grade SBO within her parastomal hernia on 9/1. FU with surgery in 1 week post discharge      UTI (urinary tract infection)  -UA positive  -On aztreonam     Colostomy in place  -Noted  -medium amount formed brown stool noted at admission, none today  -site intact with irritation around--consult wound care      Chronic anemia  -stable  -monitor      Coronary artery disease  -Resume home statin, BB  -Resume home eliquis after surgery      Paroxysmal atrial fibrillation  Patient with Paroxysmal (<7 days) atrial fibrillation which is controlled currently with Beta Blocker. Patient is currently in sinus rhythm.[id number] Score: 4. HASBLED Score: Unable to calculate. Anticoagulation indicated. Anticoagulation done with home eliquis.  -general surgery will be planning OR later this week; holding anticoagulation      Hypertension  Starts schedule IV hydralazine until patient able to tolerate p.o.      Acquired hypothyroidism  -continue home synthroid      Epilepsy  -continue home trileptal and phenobarbital        VTE Risk Mitigation (From admission, onward)         Ordered     apixaban tablet 2.5 mg  2 times daily         09/05/22 0956     IP VTE HIGH RISK PATIENT  Once         08/30/22 0120     Place sequential compression device  Until discontinued         08/30/22 0120                 Discharge Planning   JESSICA: 9/5/2022     Code Status: Full Code   Is the patient medically ready for discharge?:     Reason for patient still in hospital (select all that apply): Pending disposition  Discharge Plan A: Return to nursing home   Discharge Delays: None known at this time              Alice Barker MD  Department of [facility]   [facility]

## 2022-09-06 NOTE — PLAN OF CARE
"   09/06/22 1102   Post-Acute Status   Post-Acute Authorization Placement   Post-Acute Placement Status Pending post-acute provider review/more information requested   Other Status Awaiting f/u Appts   Hospital Resources/Appts/Education Provided Appointments scheduled and added to AVS   Discharge Delays Orders Needed  (Orders requested for return SNF to [facility]. Awaiting orders.)   Discharge Plan   Discharge Plan A Return to nursing home     Updated daughter Amira Burris to DC today. Probable DC time communicated. Pending SNF return orders.    Future Appointments   Date Time Provider Department Center   9/14/2022  2:20 PM Chris Johnson MD [facility]     BP (!) 143/63 (BP Location: Left arm, Patient Position: Lying)   Pulse 69   Temp 96.9 °F (36.1 °C) (Oral)   Resp 14   Ht 4' 10" (1.473 m)   Wt 73.5 kg (162 lb 0.6 oz)   LMP  (LMP Unknown)   SpO2 97%   Breastfeeding No   BMI 33.87 kg/m²      apixaban  2.5 mg Oral BID    bisacodyL  10 mg Rectal Daily    ceFEPime (MAXIPIME) IVPB  2 g Intravenous Q8H    levothyroxine  125 mcg Oral Before breakfast    losartan  100 mg Oral Daily    magnesium oxide  800 mg Oral Daily    metoprolol tartrate  50 mg Oral BID    miconazole NITRATE 2 %   Topical (Top) BID    morphine  2 mg Intravenous Once    OXcarbazepine  150 mg Oral Nightly    PHENobarbitaL  97.2 mg Per NG tube Nightly    polyethylene glycol  17 g Oral BID    polyethylene glycol  17 g Oral Daily    potassium bicarbonate  25 mEq Oral Daily    pravastatin  20 mg Oral Daily    sodium chloride 0.9%  250 mL Intravenous Once    sodium chloride 0.9%  10 mL Intravenous Q8H       "

## 2022-09-07 ENCOUNTER — PATIENT OUTREACH (OUTPATIENT)
Dept: ADMINISTRATIVE | Facility: CLINIC | Age: 87
End: 2022-09-07
Payer: MEDICARE

## 2022-09-07 ENCOUNTER — PATIENT OUTREACH (OUTPATIENT)
Dept: ADMINISTRATIVE | Facility: OTHER | Age: 87
End: 2022-09-07
Payer: MEDICARE

## 2022-09-07 NOTE — PROGRESS NOTES
IP Liaison - Final Visit Note    Patient: Annette Garcia  MRN:  [id number]  Date of Service:  9/7/2022  Completed by:  PEPITO David    Reason for Visit   Patient presents with    IP Liaison Chart Review        Patient Summary     Discharge Date: 9/6/2022  Discharge telephone number/address: [phone] / [facility]  Follow up provider: Chris Johnson MD  Follow up appointments: 9/14/2022 @ 2:20pm  Home Health agency & telephone number: n/a  DME ordered &  name: n/a  Assigned OPCM RN/SW: n/a  Report sent to follow up team (PCP/OPCM) via in basket message: n/a  Community Resources arranged including agency name & contact info: n/a      EPPITO David

## 2022-09-14 ENCOUNTER — OFFICE VISIT (OUTPATIENT)
Dept: SURGERY | Facility: CLINIC | Age: 87
End: 2022-09-14
Payer: MEDICARE

## 2022-09-14 DIAGNOSIS — Z87.19 S/P HERNIA REPAIR: Primary | ICD-10-CM

## 2022-09-14 DIAGNOSIS — Z98.890 S/P HERNIA REPAIR: Primary | ICD-10-CM

## 2022-09-14 PROCEDURE — 99999 PR PBB SHADOW E&M-EST. PATIENT-LVL II: ICD-10-PCS | Mod: PBBFAC,,, | Performed by: STUDENT IN AN ORGANIZED HEALTH CARE EDUCATION/TRAINING PROGRAM

## 2022-09-14 PROCEDURE — 99024 PR POST-OP FOLLOW-UP VISIT: ICD-10-PCS | Mod: POP,,, | Performed by: STUDENT IN AN ORGANIZED HEALTH CARE EDUCATION/TRAINING PROGRAM

## 2022-09-14 PROCEDURE — 99024 POSTOP FOLLOW-UP VISIT: CPT | Mod: POP,,, | Performed by: STUDENT IN AN ORGANIZED HEALTH CARE EDUCATION/TRAINING PROGRAM

## 2022-09-14 PROCEDURE — 99212 OFFICE O/P EST SF 10 MIN: CPT | Mod: PBBFAC,PO | Performed by: STUDENT IN AN ORGANIZED HEALTH CARE EDUCATION/TRAINING PROGRAM

## 2022-09-14 PROCEDURE — 99999 PR PBB SHADOW E&M-EST. PATIENT-LVL II: CPT | Mod: PBBFAC,,, | Performed by: STUDENT IN AN ORGANIZED HEALTH CARE EDUCATION/TRAINING PROGRAM

## 2022-09-14 NOTE — PROGRESS NOTES
General Surgery Clinic  Progress Note    SUBJECTIVE:     Chief Complaint   Patient presents with    Post-op Evaluation     History of Present Illness:  Annette Garcia is a 87 y.o. female is an established patient of this practice who presents today for follow up visit. She presents for 2 weeks follow up s/p parastomal hernia repair for high-grade SBO within a parastomal hernia. She has been doing well since discharge. She is active with therapies at her nursing home. She has been having regular ostomy output on daily miralax. Her output has been soft and normal in appearance. No pain or bulges of the ostomy. No fever, chills, nausea, or vomiting.    Review of patient's allergies indicates:   Allergen Reactions    Adhesive Itching and Blisters    Penicillins Anaphylaxis    Tramadol Hives    Avelox [moxifloxacin] Rash     Facial and arm itching and redness. Pt states throat closes when given.    Amoxil [amoxicillin]     Aspridrox [aspirin, buffered]     Codeine Other (See Comments)     Throat swelling    Keflex [cephalexin]      Tolerated cefepime and cefazolin    Norvasc [amlodipine]     Red dye Hives    Robitussin [guaifenesin]     Sulfa (sulfonamide antibiotics)     Tylenol [acetaminophen]      Has reaction to Tylenol with red dye and unable to take Extra Strength Tylenol/ CAN ONLY TOLERATE REG STRENGTH TYLENOL    Vicks vaporub [camphor-eucalyptus oil-menthol]        Current Outpatient Medications   Medication Sig Dispense Refill    acetaminophen (TYLENOL) 325 MG tablet Take 2 tablets (650 mg total) by mouth every 4 (four) hours as needed for Pain.  0    apixaban (ELIQUIS) 2.5 mg Tab Take 1 tablet (2.5 mg total) by mouth 2 (two) times daily.      calcium-vitamin D 600 mg(1,500mg) -400 unit Tab Take 1 tablet by mouth once daily.      levothyroxine (SYNTHROID) 125 MCG tablet TAKE 1 TABLET (125 MCG TOTAL) BY MOUTH ONCE DAILY. 90 tablet 3    losartan (COZAAR) 50 MG tablet Take 1 tablet (50 mg total) by mouth once  daily. 90 tablet 3    magnesium oxide (MAG-OX) 400 mg (241.3 mg magnesium) tablet Take 2 tablets (800 mg total) by mouth once daily.  0    metoprolol tartrate (LOPRESSOR) 25 MG tablet Take 1 tablet (25 mg total) by mouth 2 (two) times daily. 60 tablet 11    miconazole NITRATE 2 % (MICOTIN) 2 % top powder Apply topically 2 (two) times daily. Apply to bilateral under breast 85 g 0    ondansetron (ZOFRAN) 8 MG tablet Take 8 mg by mouth every 6 (six) hours as needed for Nausea (and vomiting).      OXcarbazepine (TRILEPTAL) 150 MG Tab Take 150 mg by mouth nightly.      oxyCODONE (ROXICODONE) 5 MG immediate release tablet Take 1 tablet (5 mg total) by mouth every 3 (three) hours as needed (moderate pain 2-5/10 pain scale). 20 tablet 0    PHENobarbitaL 97.2 MG tablet Take 1 tablet (97.2 mg total) by mouth once daily at 6am. 90 tablet 0    phenoL (CHLORASEPTIC) 0.5 % SprA by Mucous Membrane route 2 (two) times daily as needed (sore throat).      polyethylene glycol (GLYCOLAX) 17 gram PwPk Take 17 g by mouth once daily. 30 packet 5    pravastatin (PRAVACHOL) 20 MG tablet Take 20 mg by mouth once daily.       No current facility-administered medications for this visit.       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     Past Surgical History:   Procedure Laterality Date    APPENDECTOMY      BACK SURGERY  1988    vertebral fracture    BACK SURGERY  02/2013    lumbar L2-5    CATARACT EXTRACTION, BILATERAL Bilateral     CHOLECYSTECTOMY      open     EYE SURGERY Bilateral     cataract removal with lens implant    HYSTERECTOMY      PERCUTANEOUS TRANSLUMINAL ANGIOPLASTY (PTA) OF PERIPHERAL VESSEL N/A 2/3/2020    Procedure: PTA, PERIPHERAL VESSEL;  Surgeon: Timmy Simmons MD;  Location: [facility] CATH LAB/EP;  Service: Cardiology;  Laterality: N/A;    PORTACATH PLACEMENT Right 09/2016    RENAL ARTERY STENT Left 07/19/2017    SIGMOIDECTOMY  10/29/2019    SMALL INTESTINE SURGERY  08/23/2016    THROMBECTOMY N/A 2/3/2020    Procedure: THROMBECTOMY;  Surgeon: Timmy Simmons MD;  Location: [facility] CATH LAB/EP;  Service: Cardiology;  Laterality: N/A;    TONSILLECTOMY      VAGINAL HYSTERECTOMY W/ ANTERIOR AND POSTERIOR VAGINAL REPAIR       Family History   Problem Relation Age of Onset    Pancreatic cancer Mother     Hypertension Father     Heart attack Father      Social History     Tobacco Use    Smoking status: Never    Smokeless tobacco: Never   Substance Use Topics    Alcohol use: No    Drug use: No        Review of Systems:  Review of Systems   Constitutional:  Negative for chills and fever.   Respiratory:  Negative for cough and shortness of breath.    Cardiovascular:  Negative for chest pain.   Gastrointestinal:  Negative for abdominal distention, abdominal pain, nausea and vomiting.   Genitourinary:  Negative for dysuria.   Neurological:  Negative for dizziness and light-headedness.     OBJECTIVE:     Vital Signs (Most Recent)            Physical Exam:  Physical Exam  Vitals reviewed.   Constitutional:       Appearance: Normal appearance.   Cardiovascular:      Rate and Rhythm: Normal rate.   Pulmonary:      Effort: Pulmonary effort is normal.   Abdominal:      General: There is no distension.      Palpations: Abdomen is soft.      Tenderness: There is no abdominal tenderness.      Comments: LLQ ostomy with normal output. Ostomy itself is healthy appearing without retraction or congestion. There is some skin irritation inferior to the appliance.   Skin:     General:  Skin is warm and dry.   Neurological:      General: No focal deficit present.      Mental Status: She is alert and oriented to person, place, and time.       Laboratory  Available labs reviewed.    Diagnostic Results:  Available imaging and diagnostic studies reviewed.    ASSESSMENT/PLAN:     87 y.o. female s/p parastomal hernia repair for SBO. She is doing well and having regular ostomy output. There was some skin irritation inferior to the appliance, but she is otherwise without issue.    PLAN:  Recommend regular appliance emptying in order to avoid leaking as well as powder application to keep the skin around the appliance dry  Continue daily miralax for regular bowel function  Follow up in clinic as needed  Activity as tolerated, no restrictions    Izaiah Villalobos MD  [facility] PGY2

## 2022-10-11 ENCOUNTER — HOSPITAL ENCOUNTER (INPATIENT)
Facility: HOSPITAL | Age: 87
LOS: 6 days | Discharge: NURSING, REHAB OR LONG-TERM CARE | DRG: 193 | End: 2022-10-17
Attending: STUDENT IN AN ORGANIZED HEALTH CARE EDUCATION/TRAINING PROGRAM | Admitting: STUDENT IN AN ORGANIZED HEALTH CARE EDUCATION/TRAINING PROGRAM
Payer: MEDICARE

## 2022-10-11 DIAGNOSIS — R05.9 COUGH: ICD-10-CM

## 2022-10-11 DIAGNOSIS — R07.9 CHEST PAIN: ICD-10-CM

## 2022-10-11 DIAGNOSIS — N30.00 ACUTE CYSTITIS WITHOUT HEMATURIA: ICD-10-CM

## 2022-10-11 DIAGNOSIS — I48.91 ATRIAL FIBRILLATION WITH RAPID VENTRICULAR RESPONSE: ICD-10-CM

## 2022-10-11 DIAGNOSIS — J18.9 PNEUMONIA OF LEFT LOWER LOBE DUE TO INFECTIOUS ORGANISM: Primary | ICD-10-CM

## 2022-10-11 DIAGNOSIS — J96.01 ACUTE HYPOXEMIC RESPIRATORY FAILURE: ICD-10-CM

## 2022-10-11 DIAGNOSIS — R06.02 SHORTNESS OF BREATH: ICD-10-CM

## 2022-10-11 LAB
ALBUMIN SERPL BCP-MCNC: 2.9 G/DL (ref 3.5–5.2)
ALLENS TEST: ABNORMAL
ALP SERPL-CCNC: 99 U/L (ref 55–135)
ALT SERPL W/O P-5'-P-CCNC: 24 U/L (ref 10–44)
ANION GAP SERPL CALC-SCNC: 14 MMOL/L (ref 8–16)
AST SERPL-CCNC: 25 U/L (ref 10–40)
BACTERIA #/AREA URNS HPF: ABNORMAL /HPF
BASOPHILS # BLD AUTO: 0.03 K/UL (ref 0–0.2)
BASOPHILS NFR BLD: 0.2 % (ref 0–1.9)
BILIRUB SERPL-MCNC: 0.6 MG/DL (ref 0.1–1)
BILIRUB UR QL STRIP: NEGATIVE
BNP SERPL-MCNC: 56 PG/ML (ref 0–99)
BUN SERPL-MCNC: 12 MG/DL (ref 8–23)
CALCIUM SERPL-MCNC: 9.5 MG/DL (ref 8.7–10.5)
CHLORIDE SERPL-SCNC: 94 MMOL/L (ref 95–110)
CLARITY UR: CLEAR
CO2 SERPL-SCNC: 24 MMOL/L (ref 23–29)
COLOR UR: YELLOW
CREAT SERPL-MCNC: 0.6 MG/DL (ref 0.5–1.4)
CTP QC/QA: YES
DELSYS: ABNORMAL
DIFFERENTIAL METHOD: ABNORMAL
EOSINOPHIL # BLD AUTO: 0 K/UL (ref 0–0.5)
EOSINOPHIL NFR BLD: 0.3 % (ref 0–8)
ERYTHROCYTE [DISTWIDTH] IN BLOOD BY AUTOMATED COUNT: 13.2 % (ref 11.5–14.5)
EST. GFR  (NO RACE VARIABLE): >60 ML/MIN/1.73 M^2
FIO2: 21
GLUCOSE SERPL-MCNC: 116 MG/DL (ref 70–110)
GLUCOSE UR QL STRIP: NEGATIVE
HCO3 UR-SCNC: 20.3 MMOL/L (ref 24–28)
HCT VFR BLD AUTO: 31.3 % (ref 37–48.5)
HGB BLD-MCNC: 10.2 G/DL (ref 12–16)
HGB UR QL STRIP: ABNORMAL
HYALINE CASTS #/AREA URNS LPF: 0 /LPF
IMM GRANULOCYTES # BLD AUTO: 0.04 K/UL (ref 0–0.04)
IMM GRANULOCYTES NFR BLD AUTO: 0.3 % (ref 0–0.5)
INFLUENZA A, MOLECULAR: NEGATIVE
INFLUENZA B, MOLECULAR: NEGATIVE
KETONES UR QL STRIP: ABNORMAL
LACTATE SERPL-SCNC: 0.9 MMOL/L (ref 0.5–2.2)
LEUKOCYTE ESTERASE UR QL STRIP: ABNORMAL
LYMPHOCYTES # BLD AUTO: 2.7 K/UL (ref 1–4.8)
LYMPHOCYTES NFR BLD: 19.6 % (ref 18–48)
MCH RBC QN AUTO: 32.1 PG (ref 27–31)
MCHC RBC AUTO-ENTMCNC: 32.6 G/DL (ref 32–36)
MCV RBC AUTO: 98 FL (ref 82–98)
MICROSCOPIC COMMENT: ABNORMAL
MODE: ABNORMAL
MONOCYTES # BLD AUTO: 1.1 K/UL (ref 0.3–1)
MONOCYTES NFR BLD: 7.9 % (ref 4–15)
NEUTROPHILS # BLD AUTO: 9.9 K/UL (ref 1.8–7.7)
NEUTROPHILS NFR BLD: 71.7 % (ref 38–73)
NITRITE UR QL STRIP: POSITIVE
NRBC BLD-RTO: 0 /100 WBC
PCO2 BLDA: 30.4 MMHG (ref 35–45)
PH SMN: 7.43 [PH] (ref 7.35–7.45)
PH UR STRIP: 7 [PH] (ref 5–8)
PLATELET # BLD AUTO: 193 K/UL (ref 150–450)
PMV BLD AUTO: 10.2 FL (ref 9.2–12.9)
PO2 BLDA: 84 MMHG (ref 40–60)
POC BE: -4 MMOL/L
POC SATURATED O2: 97 % (ref 95–100)
POC TCO2: 21 MMOL/L (ref 24–29)
POTASSIUM SERPL-SCNC: 4.3 MMOL/L (ref 3.5–5.1)
PROT SERPL-MCNC: 7.3 G/DL (ref 6–8.4)
PROT UR QL STRIP: ABNORMAL
RBC # BLD AUTO: 3.18 M/UL (ref 4–5.4)
RBC #/AREA URNS HPF: 1 /HPF (ref 0–4)
SAMPLE: ABNORMAL
SARS-COV-2 RDRP RESP QL NAA+PROBE: NEGATIVE
SITE: ABNORMAL
SODIUM SERPL-SCNC: 132 MMOL/L (ref 136–145)
SP GR UR STRIP: 1.02 (ref 1–1.03)
SP02: 100
SPECIMEN SOURCE: NORMAL
TROPONIN I SERPL DL<=0.01 NG/ML-MCNC: 0.02 NG/ML (ref 0–0.03)
URN SPEC COLLECT METH UR: ABNORMAL
UROBILINOGEN UR STRIP-ACNC: NEGATIVE EU/DL
WBC # BLD AUTO: 13.77 K/UL (ref 3.9–12.7)
WBC #/AREA URNS HPF: 35 /HPF (ref 0–5)

## 2022-10-11 PROCEDURE — 93010 ELECTROCARDIOGRAM REPORT: CPT | Mod: ,,, | Performed by: INTERNAL MEDICINE

## 2022-10-11 PROCEDURE — 87040 BLOOD CULTURE FOR BACTERIA: CPT | Performed by: EMERGENCY MEDICINE

## 2022-10-11 PROCEDURE — 27000221 HC OXYGEN, UP TO 24 HOURS

## 2022-10-11 PROCEDURE — 25000003 PHARM REV CODE 250: Performed by: STUDENT IN AN ORGANIZED HEALTH CARE EDUCATION/TRAINING PROGRAM

## 2022-10-11 PROCEDURE — 25000003 PHARM REV CODE 250: Performed by: INTERNAL MEDICINE

## 2022-10-11 PROCEDURE — 80053 COMPREHEN METABOLIC PANEL: CPT | Performed by: EMERGENCY MEDICINE

## 2022-10-11 PROCEDURE — 93010 EKG 12-LEAD: ICD-10-PCS | Mod: ,,, | Performed by: INTERNAL MEDICINE

## 2022-10-11 PROCEDURE — 93005 ELECTROCARDIOGRAM TRACING: CPT

## 2022-10-11 PROCEDURE — 25000003 PHARM REV CODE 250: Performed by: EMERGENCY MEDICINE

## 2022-10-11 PROCEDURE — 96374 THER/PROPH/DIAG INJ IV PUSH: CPT

## 2022-10-11 PROCEDURE — 63600175 PHARM REV CODE 636 W HCPCS: Performed by: EMERGENCY MEDICINE

## 2022-10-11 PROCEDURE — 82803 BLOOD GASES ANY COMBINATION: CPT

## 2022-10-11 PROCEDURE — 96361 HYDRATE IV INFUSION ADD-ON: CPT

## 2022-10-11 PROCEDURE — 81000 URINALYSIS NONAUTO W/SCOPE: CPT | Performed by: EMERGENCY MEDICINE

## 2022-10-11 PROCEDURE — 87502 INFLUENZA DNA AMP PROBE: CPT | Performed by: EMERGENCY MEDICINE

## 2022-10-11 PROCEDURE — 84484 ASSAY OF TROPONIN QUANT: CPT | Performed by: EMERGENCY MEDICINE

## 2022-10-11 PROCEDURE — 87635 SARS-COV-2 COVID-19 AMP PRB: CPT | Performed by: EMERGENCY MEDICINE

## 2022-10-11 PROCEDURE — 11000001 HC ACUTE MED/SURG PRIVATE ROOM

## 2022-10-11 PROCEDURE — 83880 ASSAY OF NATRIURETIC PEPTIDE: CPT | Performed by: EMERGENCY MEDICINE

## 2022-10-11 PROCEDURE — 94761 N-INVAS EAR/PLS OXIMETRY MLT: CPT

## 2022-10-11 PROCEDURE — 85025 COMPLETE CBC W/AUTO DIFF WBC: CPT | Performed by: EMERGENCY MEDICINE

## 2022-10-11 PROCEDURE — 99285 EMERGENCY DEPT VISIT HI MDM: CPT | Mod: 25

## 2022-10-11 PROCEDURE — 87088 URINE BACTERIA CULTURE: CPT | Performed by: EMERGENCY MEDICINE

## 2022-10-11 PROCEDURE — 25000242 PHARM REV CODE 250 ALT 637 W/ HCPCS: Performed by: EMERGENCY MEDICINE

## 2022-10-11 PROCEDURE — 87086 URINE CULTURE/COLONY COUNT: CPT | Performed by: EMERGENCY MEDICINE

## 2022-10-11 PROCEDURE — 99900035 HC TECH TIME PER 15 MIN (STAT)

## 2022-10-11 PROCEDURE — 87186 SC STD MICRODIL/AGAR DIL: CPT | Mod: 59 | Performed by: EMERGENCY MEDICINE

## 2022-10-11 PROCEDURE — 83605 ASSAY OF LACTIC ACID: CPT | Performed by: EMERGENCY MEDICINE

## 2022-10-11 PROCEDURE — 94640 AIRWAY INHALATION TREATMENT: CPT

## 2022-10-11 PROCEDURE — 87077 CULTURE AEROBIC IDENTIFY: CPT | Performed by: EMERGENCY MEDICINE

## 2022-10-11 RX ORDER — OXYCODONE HYDROCHLORIDE 5 MG/1
5 TABLET ORAL
Status: DISCONTINUED | OUTPATIENT
Start: 2022-10-11 | End: 2022-10-17 | Stop reason: HOSPADM

## 2022-10-11 RX ORDER — POLYETHYLENE GLYCOL 3350 17 G/17G
17 POWDER, FOR SOLUTION ORAL DAILY
Status: DISCONTINUED | OUTPATIENT
Start: 2022-10-12 | End: 2022-10-17 | Stop reason: HOSPADM

## 2022-10-11 RX ORDER — METOPROLOL TARTRATE 50 MG/1
50 TABLET ORAL
Status: COMPLETED | OUTPATIENT
Start: 2022-10-11 | End: 2022-10-11

## 2022-10-11 RX ORDER — METOPROLOL TARTRATE 25 MG/1
25 TABLET, FILM COATED ORAL 2 TIMES DAILY
Status: DISCONTINUED | OUTPATIENT
Start: 2022-10-11 | End: 2022-10-17 | Stop reason: HOSPADM

## 2022-10-11 RX ORDER — BENZONATATE 100 MG/1
200 CAPSULE ORAL 3 TIMES DAILY PRN
Status: DISCONTINUED | OUTPATIENT
Start: 2022-10-11 | End: 2022-10-17 | Stop reason: HOSPADM

## 2022-10-11 RX ORDER — GUAIFENESIN 600 MG/1
600 TABLET, EXTENDED RELEASE ORAL 2 TIMES DAILY
Status: DISCONTINUED | OUTPATIENT
Start: 2022-10-11 | End: 2022-10-11

## 2022-10-11 RX ORDER — IPRATROPIUM BROMIDE AND ALBUTEROL SULFATE 2.5; .5 MG/3ML; MG/3ML
3 SOLUTION RESPIRATORY (INHALATION)
Status: COMPLETED | OUTPATIENT
Start: 2022-10-11 | End: 2022-10-11

## 2022-10-11 RX ORDER — LOSARTAN POTASSIUM 50 MG/1
50 TABLET ORAL DAILY
Status: DISCONTINUED | OUTPATIENT
Start: 2022-10-12 | End: 2022-10-14

## 2022-10-11 RX ORDER — POTASSIUM CHLORIDE 20 MEQ/1
20 TABLET, EXTENDED RELEASE ORAL DAILY
Status: ON HOLD | COMMUNITY
Start: 2022-10-11 | End: 2024-01-03

## 2022-10-11 RX ORDER — CEFEPIME HYDROCHLORIDE 1 G/50ML
2 INJECTION, SOLUTION INTRAVENOUS
Status: DISCONTINUED | OUTPATIENT
Start: 2022-10-11 | End: 2022-10-17

## 2022-10-11 RX ORDER — IBUPROFEN 200 MG
24 TABLET ORAL
Status: DISCONTINUED | OUTPATIENT
Start: 2022-10-11 | End: 2022-10-17 | Stop reason: HOSPADM

## 2022-10-11 RX ORDER — PRAVASTATIN SODIUM 20 MG/1
20 TABLET ORAL DAILY
Status: DISCONTINUED | OUTPATIENT
Start: 2022-10-12 | End: 2022-10-17 | Stop reason: HOSPADM

## 2022-10-11 RX ORDER — IBUPROFEN 200 MG
16 TABLET ORAL
Status: DISCONTINUED | OUTPATIENT
Start: 2022-10-11 | End: 2022-10-17 | Stop reason: HOSPADM

## 2022-10-11 RX ORDER — SODIUM CHLORIDE 9 MG/ML
INJECTION, SOLUTION INTRAVENOUS CONTINUOUS
Status: DISCONTINUED | OUTPATIENT
Start: 2022-10-11 | End: 2022-10-17 | Stop reason: HOSPADM

## 2022-10-11 RX ORDER — LEVOTHYROXINE SODIUM 125 UG/1
125 TABLET ORAL
Status: DISCONTINUED | OUTPATIENT
Start: 2022-10-12 | End: 2022-10-17 | Stop reason: HOSPADM

## 2022-10-11 RX ORDER — POTASSIUM CHLORIDE 20 MEQ/1
20 TABLET, EXTENDED RELEASE ORAL DAILY
Status: DISCONTINUED | OUTPATIENT
Start: 2022-10-12 | End: 2022-10-17 | Stop reason: HOSPADM

## 2022-10-11 RX ORDER — OXCARBAZEPINE 150 MG/1
150 TABLET, FILM COATED ORAL NIGHTLY
Status: DISCONTINUED | OUTPATIENT
Start: 2022-10-11 | End: 2022-10-17 | Stop reason: HOSPADM

## 2022-10-11 RX ORDER — PHENOBARBITAL 32.4 MG/1
97.2 TABLET ORAL NIGHTLY
Status: DISCONTINUED | OUTPATIENT
Start: 2022-10-11 | End: 2022-10-17 | Stop reason: HOSPADM

## 2022-10-11 RX ORDER — MICONAZOLE NITRATE 2 %
POWDER (GRAM) TOPICAL 2 TIMES DAILY
Status: DISCONTINUED | OUTPATIENT
Start: 2022-10-11 | End: 2022-10-17 | Stop reason: HOSPADM

## 2022-10-11 RX ORDER — NALOXONE HCL 0.4 MG/ML
0.02 VIAL (ML) INJECTION
Status: DISCONTINUED | OUTPATIENT
Start: 2022-10-11 | End: 2022-10-17 | Stop reason: HOSPADM

## 2022-10-11 RX ORDER — CEFPODOXIME PROXETIL 200 MG/1
200 TABLET, FILM COATED ORAL 2 TIMES DAILY
Qty: 10 TABLET | Refills: 0 | Status: CANCELLED | OUTPATIENT
Start: 2022-10-11 | End: 2022-10-16

## 2022-10-11 RX ORDER — DOXYCYCLINE 100 MG/1
100 CAPSULE ORAL 2 TIMES DAILY
Status: ON HOLD | COMMUNITY
Start: 2022-10-11 | End: 2022-10-17 | Stop reason: HOSPADM

## 2022-10-11 RX ORDER — CHOLECALCIFEROL (VITAMIN D3) 25 MCG
1000 TABLET ORAL DAILY
Status: DISCONTINUED | OUTPATIENT
Start: 2022-10-12 | End: 2022-10-17 | Stop reason: HOSPADM

## 2022-10-11 RX ORDER — DOXYCYCLINE HYCLATE 100 MG
100 TABLET ORAL 2 TIMES DAILY
Qty: 10 TABLET | Refills: 0 | Status: CANCELLED | OUTPATIENT
Start: 2022-10-11 | End: 2022-10-16

## 2022-10-11 RX ORDER — FUROSEMIDE 20 MG/1
20 TABLET ORAL DAILY
Status: DISCONTINUED | OUTPATIENT
Start: 2022-10-12 | End: 2022-10-17 | Stop reason: HOSPADM

## 2022-10-11 RX ORDER — GUAIFENESIN 600 MG/1
600 TABLET, EXTENDED RELEASE ORAL 2 TIMES DAILY
COMMUNITY
End: 2023-02-14

## 2022-10-11 RX ORDER — FUROSEMIDE 20 MG/1
20 TABLET ORAL DAILY
COMMUNITY
Start: 2022-10-11

## 2022-10-11 RX ORDER — SODIUM CHLORIDE 0.9 % (FLUSH) 0.9 %
10 SYRINGE (ML) INJECTION EVERY 12 HOURS PRN
Status: DISCONTINUED | OUTPATIENT
Start: 2022-10-11 | End: 2022-10-17 | Stop reason: HOSPADM

## 2022-10-11 RX ORDER — CHOLECALCIFEROL (VITAMIN D3) 25 MCG
1000 TABLET ORAL DAILY
COMMUNITY

## 2022-10-11 RX ORDER — GLUCAGON 1 MG
1 KIT INJECTION
Status: DISCONTINUED | OUTPATIENT
Start: 2022-10-11 | End: 2022-10-17 | Stop reason: HOSPADM

## 2022-10-11 RX ADMIN — SODIUM CHLORIDE 500 ML: 0.9 INJECTION, SOLUTION INTRAVENOUS at 10:10

## 2022-10-11 RX ADMIN — IPRATROPIUM BROMIDE AND ALBUTEROL SULFATE 3 ML: .5; 3 SOLUTION RESPIRATORY (INHALATION) at 11:10

## 2022-10-11 RX ADMIN — APIXABAN 2.5 MG: 2.5 TABLET, FILM COATED ORAL at 10:10

## 2022-10-11 RX ADMIN — SODIUM CHLORIDE: 0.9 INJECTION, SOLUTION INTRAVENOUS at 10:10

## 2022-10-11 RX ADMIN — OXCARBAZEPINE 150 MG: 150 TABLET, FILM COATED ORAL at 10:10

## 2022-10-11 RX ADMIN — METOPROLOL TARTRATE 50 MG: 50 TABLET, FILM COATED ORAL at 03:10

## 2022-10-11 RX ADMIN — AZITHROMYCIN MONOHYDRATE 500 MG: 500 INJECTION, POWDER, LYOPHILIZED, FOR SOLUTION INTRAVENOUS at 02:10

## 2022-10-11 RX ADMIN — METOPROLOL TARTRATE 25 MG: 25 TABLET, FILM COATED ORAL at 10:10

## 2022-10-11 RX ADMIN — CEFEPIME 2 G: 2 INJECTION, POWDER, FOR SOLUTION INTRAVENOUS at 03:10

## 2022-10-11 RX ADMIN — MICONAZOLE NITRATE 2 % TOPICAL POWDER: at 10:10

## 2022-10-11 RX ADMIN — PHENOBARBITAL 97.2 MG: 32.4 TABLET ORAL at 10:10

## 2022-10-11 NOTE — PHARMACY MED REC
"  [facility]           Pharmacy  Admission Medication History     The home medication history was taken by Marcia Tabares.      Medication history obtained from Medications listed below were obtained from: Nursing home    Based on information gathered for medication list, you may go to "Admission" then "Reconcile Home Medications" tabs to review and/or act upon those items.     The home medication list has been updated by the Pharmacy department.   Please read ALL comments highlighted in yellow.   Please address this information as you see fit.    Feel free to contact us if you have any questions or require assistance.        No current facility-administered medications on file prior to encounter.     Current Outpatient Medications on File Prior to Encounter   Medication Sig Dispense Refill    acetaminophen (TYLENOL) 325 MG tablet Take 2 tablets (650 mg total) by mouth every 4 (four) hours as needed for Pain.  0    apixaban (ELIQUIS) 2.5 mg Tab Take 1 tablet (2.5 mg total) by mouth 2 (two) times daily.      calcium-vitamin D 600 mg(1,500mg) -400 unit Tab Take 1 tablet by mouth once daily.      doxycycline (VIBRAMYCIN) 100 MG Cap Take 100 mg by mouth 2 (two) times daily. For 10 days      furosemide (LASIX) 20 MG tablet Take 20 mg by mouth once daily.      guaiFENesin (MUCINEX) 600 mg 12 hr tablet Take 600 mg by mouth 2 (two) times daily. For 10 days      levothyroxine (SYNTHROID) 125 MCG tablet TAKE 1 TABLET (125 MCG TOTAL) BY MOUTH ONCE DAILY. 90 tablet 3    losartan (COZAAR) 50 MG tablet Take 1 tablet (50 mg total) by mouth once daily. 90 tablet 3    magnesium oxide (MAG-OX) 400 mg (241.3 mg magnesium) tablet Take 2 tablets (800 mg total) by mouth once daily.  0    metoprolol tartrate (LOPRESSOR) 25 MG tablet Take 1 tablet (25 mg total) by mouth 2 (two) times daily. 60 tablet 11    ondansetron (ZOFRAN) 8 MG tablet Take 8 mg by mouth every 6 (six) hours as needed for Nausea (and vomiting).      " OXcarbazepine (TRILEPTAL) 150 MG Tab Take 150 mg by mouth nightly.      oxyCODONE (ROXICODONE) 5 MG immediate release tablet Take 1 tablet (5 mg total) by mouth every 3 (three) hours as needed (moderate pain 2-5/10 pain scale). (Patient taking differently: Take 5 mg by mouth every 3 (three) hours as needed (moderate to severe pain).) 20 tablet 0    PHENobarbitaL 97.2 MG tablet Take 1 tablet (97.2 mg total) by mouth once daily at 6am. (Patient taking differently: Take 97.2 mg by mouth every evening.) 90 tablet 0    phenoL (CHLORASEPTIC) 0.5 % SprA by Mucous Membrane route 2 (two) times daily as needed (sore throat).      polyethylene glycol (GLYCOLAX) 17 gram PwPk Take 17 g by mouth once daily. 30 packet 5    potassium chloride SA (K-DUR,KLOR-CON) 20 MEQ tablet Take 20 mEq by mouth once daily.      pravastatin (PRAVACHOL) 20 MG tablet Take 20 mg by mouth once daily.      vitamin D (VITAMIN D3) 1000 units Tab Take 1,000 Units by mouth once daily.      miconazole NITRATE 2 % (MICOTIN) 2 % top powder Apply topically 2 (two) times daily. Apply to bilateral under breast 85 g 0       Please address this information as you see fit.  Feel free to contact us if you have any questions or require assistance.    Marcia Tabares  [phone]                .

## 2022-10-11 NOTE — NURSING
Pt. To unit. VSS. Pt. AAOx4. No c/o N/V or pain. Pt. Oriented to room and call light. Bed alarm on and call light in reach. Pt. Voiced understanding.

## 2022-10-11 NOTE — PROVIDER PROGRESS NOTES - EMERGENCY DEPT.
Encounter Date: 10/11/2022    ED Physician Progress Notes            Critical Care    Date/Time: 10/11/2022 6:08 PM  Performed by: Jaen Hester DO  Authorized by: Jane Hester DO   Direct patient critical care time: 15 minutes  Additional history critical care time: 10 minutes  Ordering / reviewing critical care time: 5 minutes  Documentation critical care time: 5 minutes  Consult with family critical care time: 5 minutes  Total critical care time (exclusive of procedural time) : 40 minutes  Critical care was necessary to treat or prevent imminent or life-threatening deterioration of the following conditions: respiratory failure.

## 2022-10-11 NOTE — NURSING
Pt arrived to unit. Introduced self as VN for this shift. Admission questions completed by VN. Educated pt on VTE risk, safety precautions, and VN's role in pt care. Daughter Beatris contacted. Aware of admission. Patient PW updated. Opportunity given for pt's questions. All questions answered.            10/11/22 1814   Admission   Initial VN Admission Questions Complete   Communication Issues? None   Shift   Virtual Nurse - Rounding Complete   Virtual Nurse - Patient Verbalized Approval Of Camera Use;VN Rounding   Safety/Activity   Patient Rounds bed in low position;visualized patient;call light in patient/parent reach;clutter free environment maintained;placement of personal items at bedside   Safety Promotion/Fall Prevention side rails raised x 2   Positioning   Body Position sitting up in bed   Pain/Comfort/Sleep   Pain Rating (0-10): Rest 0        10/11/22 1814   Admission   Initial VN Admission Questions Complete   Communication Issues? None   Shift   Virtual Nurse - Rounding Complete   Virtual Nurse - Patient Verbalized Approval Of Camera Use;VN Rounding   Safety/Activity   Patient Rounds bed in low position;visualized patient;call light in patient/parent reach;clutter free environment maintained;placement of personal items at bedside   Safety Promotion/Fall Prevention side rails raised x 2   Positioning   Body Position sitting up in bed   Pain/Comfort/Sleep   Pain Rating (0-10): Rest 0

## 2022-10-11 NOTE — ED PROVIDER NOTES
Encounter Date: 10/11/2022       History     Chief Complaint   Patient presents with    Cough     Brought to Ed from [facility] for cough x 2 days.      HPI  86-year-old female with a history of history of CAD, disorder of kidney and ureter, HTN, atrial fibrillation (on Eliquis), seizures, hypothyroidism, colostomy dependent who presented to the ED from [facility] for cough and AMS. Patient is alert and oriented x 4 in the ED. Reports that she has the cough for 2 days now, sometimes coughed up white sputum. Endorses shortness of breath. She was 92% on room air. Denies fever, chest pain, abdominal pain, nausea or vomiting.   I decided to call her nursing home staff for collateral information. They report that patient was diagnosed with pneumonia last night, antibiotic was order but didn't given to her yet. She woke up with decreased alertness and slow to response which prompted them to send her to the ED. They has oxygen capacity.   Review of patient's allergies indicates:   Allergen Reactions    Adhesive Itching and Blisters    Penicillins Anaphylaxis    Tramadol Hives    Avelox [moxifloxacin] Rash     Facial and arm itching and redness. Pt states throat closes when given.    Amoxil [amoxicillin]     Aspridrox [aspirin, buffered]     Codeine Other (See Comments)     Throat swelling    Keflex [cephalexin]      Tolerated cefepime and cefazolin    Norvasc [amlodipine]     Red dye Hives    Robitussin [guaifenesin]     Sulfa (sulfonamide antibiotics)     Tylenol [acetaminophen]      Has reaction to Tylenol with red dye and unable to take Extra Strength Tylenol/ CAN ONLY TOLERATE REG STRENGTH TYLENOL    Vicks vaporub [camphor-eucalyptus oil-menthol]      Past Medical History:   Diagnosis Date    Allergy     Arthritis     arms and legs-osteoarthritis    Cancer     colon    Coronary artery disease 08/14/2020    Digestive disorder     Disorder of kidney and ureter     E coli bacteremia 10/29/2019    Encounter for  blood transfusion     Hypertension     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     Past Surgical History:   Procedure Laterality Date    APPENDECTOMY      BACK SURGERY  1988    vertebral fracture    BACK SURGERY  02/2013    lumbar L2-5    CATARACT EXTRACTION, BILATERAL Bilateral     CHOLECYSTECTOMY      open    EYE SURGERY Bilateral     cataract removal with lens implant    HYSTERECTOMY      PERCUTANEOUS TRANSLUMINAL ANGIOPLASTY (PTA) OF PERIPHERAL VESSEL N/A 2/3/2020    Procedure: PTA, PERIPHERAL VESSEL;  Surgeon: Timmy Simmons MD;  Location: [facility] CATH LAB/EP;  Service: Cardiology;  Laterality: N/A;    PORTACATH PLACEMENT Right 09/2016    RENAL ARTERY STENT Left 07/19/2017    SIGMOIDECTOMY  10/29/2019    SMALL INTESTINE SURGERY  08/23/2016    THROMBECTOMY N/A 2/3/2020    Procedure: THROMBECTOMY;  Surgeon: Timmy Simmons MD;  Location: [facility] CATH LAB/EP;  Service: Cardiology;  Laterality: N/A;    TONSILLECTOMY      VAGINAL HYSTERECTOMY W/ ANTERIOR AND POSTERIOR VAGINAL REPAIR       Family History   Problem Relation Age of Onset    Pancreatic cancer Mother     Hypertension Father     Heart attack Father      Social History     Tobacco Use    Smoking status: Never    Smokeless tobacco: Never   Substance Use Topics    Alcohol use: No    Drug use: No     Review of Systems   Constitutional:  Negative for chills and fever.   HENT:  Negative for congestion and sore throat.    Eyes:  Negative for pain and visual disturbance.   Respiratory:  Positive for cough and shortness of breath. Negative for wheezing.    Cardiovascular:  Positive for chest pain and leg swelling (L > R chronic).   Gastrointestinal:  Negative for abdominal pain, nausea and vomiting.   Genitourinary:  Negative for dysuria and flank pain.   Musculoskeletal:   Negative for back pain and neck pain.   Skin:  Negative for rash.   Neurological:  Negative for weakness and headaches.   Psychiatric/Behavioral:  Negative for behavioral problems and confusion.      Physical Exam     Initial Vitals [10/11/22 1016]   BP Pulse Resp Temp SpO2   133/64 82 20 98.2 °F (36.8 °C) (!) 92 %      MAP       --         Physical Exam    Nursing note and vitals reviewed.  Constitutional: She appears well-developed. No distress.   HENT:   Head: Normocephalic and atraumatic.   Mouth/Throat: Oropharynx is clear and moist.   Eyes: Conjunctivae and EOM are normal. Pupils are equal, round, and reactive to light.   Neck: No JVD present.   Normal range of motion.  Cardiovascular:  Normal rate, regular rhythm, normal heart sounds and intact distal pulses.           Pulmonary/Chest: No respiratory distress. She has rales.   Abdominal: Abdomen is soft. She exhibits no distension. There is no abdominal tenderness.   Musculoskeletal:         General: No tenderness or edema. Normal range of motion.      Cervical back: Normal range of motion.      Comments: Lower extremities non-pitting edema: L>R, no calf tenderness     Neurological: She is alert and oriented to person, place, and time. She has normal strength. No cranial nerve deficit.   Skin: Skin is warm and dry. Capillary refill takes less than 2 seconds.       ED Course   Procedures  Labs Reviewed   CBC W/ AUTO DIFFERENTIAL - Abnormal; Notable for the following components:       Result Value    WBC 13.77 (*)     RBC 3.18 (*)     Hemoglobin 10.2 (*)     Hematocrit 31.3 (*)     MCH 32.1 (*)     Gran # (ANC) 9.9 (*)     Mono # 1.1 (*)     All other components within normal limits   COMPREHENSIVE METABOLIC PANEL - Abnormal; Notable for the following components:    Sodium 132 (*)     Chloride 94 (*)     Glucose 116 (*)     Albumin 2.9 (*)     All other components within normal limits   URINALYSIS, REFLEX TO URINE CULTURE - Abnormal; Notable for the following  components:    Protein, UA 2+ (*)     Ketones, UA 1+ (*)     Occult Blood UA 2+ (*)     Nitrite, UA Positive (*)     Leukocytes, UA 1+ (*)     All other components within normal limits    Narrative:     Specimen Source->Urine   URINALYSIS MICROSCOPIC - Abnormal; Notable for the following components:    WBC, UA 35 (*)     All other components within normal limits    Narrative:     Specimen Source->Urine   ISTAT PROCEDURE - Abnormal; Notable for the following components:    POC PCO2 30.4 (*)     POC PO2 84 (*)     POC HCO3 20.3 (*)     POC TCO2 21 (*)     All other components within normal limits   INFLUENZA A & B BY MOLECULAR   CULTURE, BLOOD   CULTURE, BLOOD   CULTURE, URINE   B-TYPE NATRIURETIC PEPTIDE   TROPONIN I   LACTIC ACID, PLASMA   SARS-COV-2 RDRP GENE     EKG Readings: (Independently Interpreted)   Initial Reading: No STEMI. Rhythm: Normal Sinus Rhythm. Heart Rate: 85. Ectopy: No Ectopy. Conduction: Normal. ST Segments: Normal ST Segments. T Waves: Normal. Axis: Normal. Clinical Impression: Normal Sinus Rhythm   ECG Results              EKG 12-lead (In process)  Result time 10/11/22 15:46:14      In process by Interface, Lab In [facility] (10/11/22 15:46:14)                   Narrative:    Test Reason : R05.9,    Vent. Rate : 085 BPM     Atrial Rate : 085 BPM     P-R Int : 122 ms          QRS Dur : 088 ms      QT Int : 364 ms       P-R-T Axes : 018 014 048 degrees     QTc Int : 433 ms    Normal sinus rhythm  Normal ECG  When compared with ECG of 30-AUG-2022 06:01,  Sinus rhythm has replaced Atrial fibrillation  Vent. rate has decreased BY  62 BPM  ST no longer depressed in Inferior leads  ST no longer depressed in Anterior-lateral leads  T wave inversion no longer evident in Lateral leads    Referred By: [email or web address]   SELF           Confirmed By:                                   Imaging Results              X-Ray Chest AP Portable (Final result)  Result time 10/11/22 11:20:20      Final result by Jonathan BENTON  MD Bob (10/11/22 11:20:20)                   Impression:      1. Increased parenchymal attenuation projected over the left lower lung zone concerning for developing consolidation.  Correlation is advised.      Electronically signed by: Jonathan Rojas MD  Date:    10/11/2022  Time:    11:20               Narrative:    EXAMINATION:  XR CHEST AP PORTABLE    CLINICAL HISTORY:  cough;    TECHNIQUE:  Single frontal view of the chest was performed.    COMPARISON:  08/30/2022    FINDINGS:  The cardiomediastinal silhouette is not enlarged noting calcification of the aorta..  There is no pleural effusion.  The trachea is midline.  The lungs are symmetrically expanded bilaterally with coarse interstitial attenuation.  There is increased parenchymal attenuation projected over the left lower lung zone..  There is no pneumothorax.  The osseous structures are remarkable for degenerative changes and osteopenia..                                    X-Rays:   Independently Interpreted Readings:   Chest X-Ray: Normal heart size. There is an infiltrate in the LLL.   Medications   cefepime in dextrose 5 % IVPB 2 g (0 g Intravenous Stopped 10/11/22 1628)   sodium chloride 0.9% bolus 500 mL (0 mLs Intravenous Stopped 10/11/22 1245)   albuterol-ipratropium 2.5 mg-0.5 mg/3 mL nebulizer solution 3 mL (3 mLs Nebulization Given 10/11/22 1138)   metoprolol tartrate (LOPRESSOR) tablet 50 mg (50 mg Oral Given 10/11/22 1559)   azithromycin 500 mg in dextrose 5 % 250 mL IVPB (ready to mix system) (0 mg Intravenous Stopped 10/11/22 1531)     Medical Decision Making:   History:   Old Medical Records: I decided to obtain old medical records.  Initial Assessment:   86-year-old female with a history of history of CAD, disorder of kidney and ureter, HTN, atrial fibrillation (on Eliquis), seizures, hypothyroidism, colostomy dependent who presented to the ED from [facility] for cough and AMS. Patient is alert and oriented, no respiratory  distress, lung with rales on her left lower lobe, no neurological deficit  Differential Diagnosis:   Pneumonia, UTI, sepsis, dehydration, electrolyte disturbance, ACS, doubt intracranial process.   Clinical Tests:   Lab Tests: Ordered and Reviewed  Radiological Study: Ordered and Reviewed  Medical Tests: Ordered and Reviewed  ED Management:  Patient is found to have leukocytosis, hyponatremia.  Chest x-ray is concerning for left lower lobe consolidation.  Patient is also found to have a UTI.  Will treat her with cefepime and azithromycin for pneumonia and UTI.  Patient has a run of AFib with RVR in the ED, prescribed her home dose of metoprolol.  Discussed with Hospital Medicine, agreed to admit patient for inpatient treatment of pneumonia.  Patient and family were updated at bedside, no other question.           ED Course as of 10/11/22 1716   Tue Oct 11, 2022   1108 WBC(!): 13.77 [NC]   1108 Hemoglobin(!): 10.2 [NC]   1108 Platelets: 193 [NC]   1200 Sodium(!): 132 [NC]   1200 Potassium: 4.3 [NC]   1200 BUN: 12 [NC]   1200 Creatinine: 0.6 [NC]   1200 Troponin I: 0.023 [NC]   1200 BNP: 56 [NC]   1200 Lactate, Jim: 0.9 [NC]      ED Course User Index  [NC] Ishan Zimmerman MD                   Clinical Impression:   Final diagnoses:  [R06.02] Shortness of breath  [J18.9] Pneumonia of left lower lobe due to infectious organism (Primary)  [J96.01] Acute hypoxemic respiratory failure  [I48.91] Atrial fibrillation with rapid ventricular response        ED Disposition Condition    Admit                 Ishan Zimmerman MD  Resident  10/11/22 3680

## 2022-10-12 PROBLEM — I82.409 DVT (DEEP VENOUS THROMBOSIS): Status: ACTIVE | Noted: 2022-10-12

## 2022-10-12 PROBLEM — Z66 DNR (DO NOT RESUSCITATE): Status: ACTIVE | Noted: 2021-10-08

## 2022-10-12 PROBLEM — I82.502 CHRONIC DEEP VEIN THROMBOSIS (DVT) OF LEFT LOWER EXTREMITY: Status: ACTIVE | Noted: 2022-07-27

## 2022-10-12 PROBLEM — F33.9 MAJOR DEPRESSIVE DISORDER, RECURRENT, UNSPECIFIED: Status: ACTIVE | Noted: 2021-10-08

## 2022-10-12 LAB
ALBUMIN SERPL BCP-MCNC: 2.5 G/DL (ref 3.5–5.2)
ALP SERPL-CCNC: 88 U/L (ref 55–135)
ALT SERPL W/O P-5'-P-CCNC: 33 U/L (ref 10–44)
ANION GAP SERPL CALC-SCNC: 8 MMOL/L (ref 8–16)
AST SERPL-CCNC: 38 U/L (ref 10–40)
BASOPHILS # BLD AUTO: 0.04 K/UL (ref 0–0.2)
BASOPHILS NFR BLD: 0.5 % (ref 0–1.9)
BILIRUB SERPL-MCNC: 0.5 MG/DL (ref 0.1–1)
BUN SERPL-MCNC: 16 MG/DL (ref 8–23)
CALCIUM SERPL-MCNC: 8.8 MG/DL (ref 8.7–10.5)
CHLORIDE SERPL-SCNC: 97 MMOL/L (ref 95–110)
CO2 SERPL-SCNC: 28 MMOL/L (ref 23–29)
CREAT SERPL-MCNC: 0.6 MG/DL (ref 0.5–1.4)
DIFFERENTIAL METHOD: ABNORMAL
EOSINOPHIL # BLD AUTO: 0.2 K/UL (ref 0–0.5)
EOSINOPHIL NFR BLD: 2.5 % (ref 0–8)
ERYTHROCYTE [DISTWIDTH] IN BLOOD BY AUTOMATED COUNT: 13.4 % (ref 11.5–14.5)
EST. GFR  (NO RACE VARIABLE): >60 ML/MIN/1.73 M^2
GLUCOSE SERPL-MCNC: 111 MG/DL (ref 70–110)
HCT VFR BLD AUTO: 28.7 % (ref 37–48.5)
HGB BLD-MCNC: 9.6 G/DL (ref 12–16)
IMM GRANULOCYTES # BLD AUTO: 0.03 K/UL (ref 0–0.04)
IMM GRANULOCYTES NFR BLD AUTO: 0.4 % (ref 0–0.5)
LYMPHOCYTES # BLD AUTO: 1.7 K/UL (ref 1–4.8)
LYMPHOCYTES NFR BLD: 22.6 % (ref 18–48)
MCH RBC QN AUTO: 32.9 PG (ref 27–31)
MCHC RBC AUTO-ENTMCNC: 33.4 G/DL (ref 32–36)
MCV RBC AUTO: 98 FL (ref 82–98)
MONOCYTES # BLD AUTO: 0.6 K/UL (ref 0.3–1)
MONOCYTES NFR BLD: 8.3 % (ref 4–15)
NEUTROPHILS # BLD AUTO: 5 K/UL (ref 1.8–7.7)
NEUTROPHILS NFR BLD: 65.7 % (ref 38–73)
NRBC BLD-RTO: 0 /100 WBC
PLATELET # BLD AUTO: 185 K/UL (ref 150–450)
PMV BLD AUTO: 10.1 FL (ref 9.2–12.9)
POTASSIUM SERPL-SCNC: 4.4 MMOL/L (ref 3.5–5.1)
PROT SERPL-MCNC: 6.5 G/DL (ref 6–8.4)
RBC # BLD AUTO: 2.92 M/UL (ref 4–5.4)
SODIUM SERPL-SCNC: 133 MMOL/L (ref 136–145)
WBC # BLD AUTO: 7.58 K/UL (ref 3.9–12.7)

## 2022-10-12 PROCEDURE — 87040 BLOOD CULTURE FOR BACTERIA: CPT | Performed by: EMERGENCY MEDICINE

## 2022-10-12 PROCEDURE — 99900035 HC TECH TIME PER 15 MIN (STAT)

## 2022-10-12 PROCEDURE — 80053 COMPREHEN METABOLIC PANEL: CPT | Performed by: INTERNAL MEDICINE

## 2022-10-12 PROCEDURE — 85025 COMPLETE CBC W/AUTO DIFF WBC: CPT | Performed by: INTERNAL MEDICINE

## 2022-10-12 PROCEDURE — 21400001 HC TELEMETRY ROOM

## 2022-10-12 PROCEDURE — 25000003 PHARM REV CODE 250: Performed by: INTERNAL MEDICINE

## 2022-10-12 PROCEDURE — 25000003 PHARM REV CODE 250: Performed by: EMERGENCY MEDICINE

## 2022-10-12 PROCEDURE — 27000221 HC OXYGEN, UP TO 24 HOURS

## 2022-10-12 PROCEDURE — 63600175 PHARM REV CODE 636 W HCPCS: Performed by: EMERGENCY MEDICINE

## 2022-10-12 PROCEDURE — 94761 N-INVAS EAR/PLS OXIMETRY MLT: CPT

## 2022-10-12 PROCEDURE — 36415 COLL VENOUS BLD VENIPUNCTURE: CPT | Performed by: INTERNAL MEDICINE

## 2022-10-12 RX ADMIN — PRAVASTATIN SODIUM 20 MG: 20 TABLET ORAL at 09:10

## 2022-10-12 RX ADMIN — FUROSEMIDE 20 MG: 20 TABLET ORAL at 09:10

## 2022-10-12 RX ADMIN — METOPROLOL TARTRATE 25 MG: 25 TABLET, FILM COATED ORAL at 09:10

## 2022-10-12 RX ADMIN — LEVOTHYROXINE SODIUM 125 MCG: 0.12 TABLET ORAL at 05:10

## 2022-10-12 RX ADMIN — BENZONATATE 200 MG: 100 CAPSULE ORAL at 08:10

## 2022-10-12 RX ADMIN — POLYETHYLENE GLYCOL 3350 17 G: 17 POWDER, FOR SOLUTION ORAL at 09:10

## 2022-10-12 RX ADMIN — APIXABAN 2.5 MG: 2.5 TABLET, FILM COATED ORAL at 08:10

## 2022-10-12 RX ADMIN — PHENOBARBITAL 97.2 MG: 32.4 TABLET ORAL at 08:10

## 2022-10-12 RX ADMIN — MICONAZOLE NITRATE 2 % TOPICAL POWDER: at 08:10

## 2022-10-12 RX ADMIN — CHOLECALCIFEROL TAB 25 MCG (1000 UNIT) 1000 UNITS: 25 TAB at 09:10

## 2022-10-12 RX ADMIN — CEFEPIME 2 G: 2 INJECTION, POWDER, FOR SOLUTION INTRAVENOUS at 02:10

## 2022-10-12 RX ADMIN — APIXABAN 2.5 MG: 2.5 TABLET, FILM COATED ORAL at 09:10

## 2022-10-12 RX ADMIN — OXCARBAZEPINE 150 MG: 150 TABLET, FILM COATED ORAL at 08:10

## 2022-10-12 RX ADMIN — METOPROLOL TARTRATE 25 MG: 25 TABLET, FILM COATED ORAL at 08:10

## 2022-10-12 RX ADMIN — POTASSIUM CHLORIDE 20 MEQ: 1500 TABLET, EXTENDED RELEASE ORAL at 09:10

## 2022-10-12 RX ADMIN — LOSARTAN POTASSIUM 50 MG: 50 TABLET, FILM COATED ORAL at 09:10

## 2022-10-12 NOTE — ASSESSMENT & PLAN NOTE
Patient with Hypoxic Respiratory failure which is Acute.  she is not on home oxygen. Supplemental oxygen was provided and noted-  .   Signs/symptoms of respiratory failure include- wheezing. Contributing diagnoses includes -Labs and images were reviewed. Patient has recent VBG which was reviewed which does not show any elevated CO2.  Will treat underlying causes and adjust management of respiratory failure as follows-   - continue supplemental oxygen.  - antibiotics with cefepime to cover both her UTI and pneumonia.

## 2022-10-12 NOTE — PROGRESS NOTES
[facility]           Pharmacy        Current Drug Shortage     Due to national backorder and [facility] is critically low on inventory of Dextrose 50% (D50) Syringes and Vials, pharmacy has automatically switched from D50% to D10% IVPB at the equivalent dose until resolution of the shortage per P&T approved protocol.               Hubert Aguirre, PharmD  [phone]

## 2022-10-12 NOTE — SUBJECTIVE & OBJECTIVE
Interval History:  No acute events overnight.  Patient reports feeling somewhat better.  Dyspnea has improved.  Hard of hearing.  Son and daughter-in-law at bedside updated.    Review of Systems   Constitutional:  Positive for activity change, appetite change and fatigue.   Eyes:  Negative for visual disturbance.   Respiratory:  Positive for shortness of breath. Negative for cough.    Cardiovascular:  Negative for chest pain and leg swelling.   Gastrointestinal:  Negative for abdominal pain, diarrhea, nausea and vomiting.   Genitourinary:  Negative for dysuria, frequency and urgency.   Musculoskeletal:  Negative for back pain.   Neurological:  Negative for light-headedness and headaches.   Psychiatric/Behavioral:  Negative for confusion.    Objective:     Vital Signs (Most Recent):  Temp: 97.9 °F (36.6 °C) (10/12/22 1640)  Pulse: 78 (10/12/22 1640)  Resp: 18 (10/12/22 1640)  BP: 133/61 (10/12/22 1640)  SpO2: 98 % (10/12/22 1640) Vital Signs (24h Range):  Temp:  [97.7 °F (36.5 °C)-98.2 °F (36.8 °C)] 97.9 °F (36.6 °C)  Pulse:  [64-87] 78  Resp:  [16-19] 18  SpO2:  [92 %-99 %] 98 %  BP: (129-147)/(59-68) 133/61     Weight: 74.2 kg (163 lb 9.3 oz)  Body mass index is 34.19 kg/m².    Intake/Output Summary (Last 24 hours) at 10/12/2022 1804  Last data filed at 10/12/2022 1800  Gross per 24 hour   Intake 60 ml   Output 400 ml   Net -340 ml      Physical Exam  Vitals and nursing note reviewed.   Constitutional:       General: She is not in acute distress.     Appearance: She is obese.   HENT:      Head: Normocephalic and atraumatic.      Mouth/Throat:      Mouth: Mucous membranes are moist.   Eyes:      General: No scleral icterus.  Cardiovascular:      Rate and Rhythm: Normal rate and regular rhythm.      Pulses: Normal pulses.      Heart sounds: Normal heart sounds. No murmur heard.  Pulmonary:      Effort: Pulmonary effort is normal. No respiratory distress.      Breath sounds: Wheezing present.   Abdominal:       Palpations: Abdomen is soft.      Tenderness: There is no abdominal tenderness.      Comments: Colostomy bag in place with brown stool   Musculoskeletal:      Cervical back: Neck supple.      Right lower leg: No edema.      Left lower leg: No edema.   Skin:     General: Skin is warm and dry.      Findings: No bruising or rash.   Neurological:      General: No focal deficit present.      Mental Status: She is alert and oriented to person, place, and time.   Psychiatric:         Mood and Affect: Mood normal.       Significant Labs: All pertinent labs within the past 24 hours have been reviewed.    Significant Imaging: I have reviewed all pertinent imaging results/findings within the past 24 hours.

## 2022-10-12 NOTE — ASSESSMENT & PLAN NOTE
- UA with culture was concerning for urinary tract infection.  Culture sensitivities have not yet resulted.  She was started on cefepime in the emergency department which we will continue.

## 2022-10-12 NOTE — PROGRESS NOTES
[facility] Medicine  Progress Note    Patient Name: Annette Garcia  MRN: [id number]  Patient Class: IP- Inpatient   Admission Date: 10/11/2022  Length of Stay: 1 days  Attending Physician: Clarice Holloway MD  Primary Care Provider: Jose Valles MD        Subjective:     Principal Problem:Acute hypoxemic respiratory failure        HPI:  This is an 86-year-old female with a history of CAD, hypertension, atrial fibrillation on Eliquis, seizure disorder, hypothyroidism, colostomy presents to the emergency department from an outside nursing facility for cough, hypoxemia, altered mental status.  She has been having a cough for approximately 2 days now.  The cough is productive.  She does feel short of breath.  She denies any recent sick contacts.  There has not been any issues with fever, chest pain, chills, abdominal pain, nausea or vomiting.  A chest x-ray at a nursing facility did show possible pneumonia and she was started on doxycycline.  She has no history of being on home oxygen supplementation and she was hypoxic in the emergency department in the high 80s.  VBG did not show any CO2 retention.  She is also noted to have an episode of AFib with RVR.  Oral dose of metoprolol was given.      Overview/Hospital Course:  No notes on file    Interval History:  No acute events overnight.  Patient reports feeling somewhat better.  Dyspnea has improved.  Hard of hearing.  Son and daughter-in-law at bedside updated.    Review of Systems   Constitutional:  Positive for activity change, appetite change and fatigue.   Eyes:  Negative for visual disturbance.   Respiratory:  Positive for shortness of breath. Negative for cough.    Cardiovascular:  Negative for chest pain and leg swelling.   Gastrointestinal:  Negative for abdominal pain, diarrhea, nausea and vomiting.   Genitourinary:  Negative for dysuria, frequency and urgency.   Musculoskeletal:  Negative for back pain.   Neurological:  Negative for  light-headedness and headaches.   Psychiatric/Behavioral:  Negative for confusion.    Objective:     Vital Signs (Most Recent):  Temp: 97.9 °F (36.6 °C) (10/12/22 1640)  Pulse: 78 (10/12/22 1640)  Resp: 18 (10/12/22 1640)  BP: 133/61 (10/12/22 1640)  SpO2: 98 % (10/12/22 1640) Vital Signs (24h Range):  Temp:  [97.7 °F (36.5 °C)-98.2 °F (36.8 °C)] 97.9 °F (36.6 °C)  Pulse:  [64-87] 78  Resp:  [16-19] 18  SpO2:  [92 %-99 %] 98 %  BP: (129-147)/(59-68) 133/61     Weight: 74.2 kg (163 lb 9.3 oz)  Body mass index is 34.19 kg/m².    Intake/Output Summary (Last 24 hours) at 10/12/2022 1804  Last data filed at 10/12/2022 1800  Gross per 24 hour   Intake 60 ml   Output 400 ml   Net -340 ml      Physical Exam  Vitals and nursing note reviewed.   Constitutional:       General: She is not in acute distress.     Appearance: She is obese.   HENT:      Head: Normocephalic and atraumatic.      Mouth/Throat:      Mouth: Mucous membranes are moist.   Eyes:      General: No scleral icterus.  Cardiovascular:      Rate and Rhythm: Normal rate and regular rhythm.      Pulses: Normal pulses.      Heart sounds: Normal heart sounds. No murmur heard.  Pulmonary:      Effort: Pulmonary effort is normal. No respiratory distress.      Breath sounds: Wheezing present.   Abdominal:      Palpations: Abdomen is soft.      Tenderness: There is no abdominal tenderness.      Comments: Colostomy bag in place with brown stool   Musculoskeletal:      Cervical back: Neck supple.      Right lower leg: No edema.      Left lower leg: No edema.   Skin:     General: Skin is warm and dry.      Findings: No bruising or rash.   Neurological:      General: No focal deficit present.      Mental Status: She is alert and oriented to person, place, and time.   Psychiatric:         Mood and Affect: Mood normal.       Significant Labs: All pertinent labs within the past 24 hours have been reviewed.    Significant Imaging: I have reviewed all pertinent imaging  results/findings within the past 24 hours.    Assessment/Plan:      * Acute hypoxemic respiratory failure  Patient with Hypoxic Respiratory failure which is Acute.  she is not on home oxygen. Supplemental oxygen was provided and noted-  .   Signs/symptoms of respiratory failure include- wheezing. Contributing diagnoses includes -Labs and images were reviewed. Patient has recent VBG which was reviewed which does not show any elevated CO2.  Will treat underlying causes and adjust management of respiratory failure as follows-   - continue supplemental oxygen.  - antibiotics with cefepime to cover both her UTI and pneumonia.    Pneumonia due to infectious organism  Chest x-ray showing findings suggestive of developing left lower consolidation.  Continue cefepime for now given her leukocytosis has improved and oxygen requirement has remained stable.    History recent hospitalization therefore low threshold to add MRSA coverage      Urinary tract infection without hematuria  - UA with culture was concerning for urinary tract infection.    - urine culture pending.  Blood cultures pending.  Continue cefepime for now until cultures result        VTE Risk Mitigation (From admission, onward)           Ordered     apixaban tablet 2.5 mg  2 times daily         10/11/22 1505                    Discharge Planning   JESSICA:      Code Status: DNR   Is the patient medically ready for discharge?:     Reason for patient still in hospital (select all that apply): Patient trending condition  Discharge Plan A: Return to nursing home          Clarice Holloway MD  Department of Hospital Medicine   [facility] Surg

## 2022-10-12 NOTE — ASSESSMENT & PLAN NOTE
Imaging from outside facility did show the beginnings of a pneumonia.  She does have an elevated white blood cell count and was started on cefepime in the emergency department.  - continue antibiotic therapy and plan to switch over to oral therapy in the next few days.

## 2022-10-12 NOTE — H&P
[facility] Medicine  History & Physical    Patient Name: Annette Garcia  MRN: [id number]  Patient Class: IP- Inpatient  Admission Date: 10/11/2022  Attending Physician: Clarice Holloway MD   Primary Care Provider: Jose Valles MD         Patient information was obtained from ER records.     Subjective:     Principal Problem:Acute hypoxemic respiratory failure    Chief Complaint:   Chief Complaint   Patient presents with    Cough     Brought to [facility] from [facility] for cough x 2 days.         HPI: This is an 86-year-old female with a history of CAD, hypertension, atrial fibrillation on Eliquis, seizure disorder, hypothyroidism, colostomy presents to the emergency department from an outside nursing facility for cough, hypoxemia, altered mental status.  She has been having a cough for approximately 2 days now.  The cough is productive.  She does feel short of breath.  She denies any recent sick contacts.  There has not been any issues with fever, chest pain, chills, abdominal pain, nausea or vomiting.  A chest x-ray at a nursing facility did show possible pneumonia and she was started on doxycycline.  She has no history of being on home oxygen supplementation and she was hypoxic in the emergency department in the high 80s.  VBG did not show any CO2 retention.  She is also noted to have an episode of AFib with RVR.  Oral dose of metoprolol was given.    Review of patient's allergies indicates:   Allergen Reactions    Adhesive Itching and Blisters    Penicillins Anaphylaxis    Tramadol Hives    Avelox [moxifloxacin] Rash       Facial and arm itching and redness. Pt states throat closes when given.    Amoxil [amoxicillin]      Aspridrox [aspirin, buffered]      Codeine Other (See Comments)       Throat swelling    Keflex [cephalexin]         Tolerated cefepime and cefazolin    Norvasc [amlodipine]      Red dye Hives    Robitussin [guaifenesin]      Sulfa (sulfonamide antibiotics)      Tylenol  [acetaminophen]         Has reaction to Tylenol with red dye and unable to take Extra Strength Tylenol/ CAN ONLY TOLERATE REG STRENGTH TYLENOL    Vicks vaporub [camphor-eucalyptus oil-menthol]             Past Medical History:   Diagnosis Date    Allergy      Arthritis       arms and legs-osteoarthritis    Cancer       colon    Coronary artery disease 08/14/2020    Digestive disorder      Disorder of kidney and ureter      E coli bacteremia 10/29/2019    Encounter for blood transfusion      Hypertension      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     In the setting of septic shock and near death    Petit mal epilepsy 1954    Scoliosis of lumbar spine      Seizures      Unspecified hypothyroidism      UTI (urinary tract infection) 05/22/2022            Past Surgical History:   Procedure Laterality Date    APPENDECTOMY        BACK SURGERY   1988     vertebral fracture    BACK SURGERY   02/2013     lumbar L2-5    CATARACT EXTRACTION, BILATERAL Bilateral      CHOLECYSTECTOMY         open    EYE SURGERY Bilateral       cataract removal with lens implant    HYSTERECTOMY        PERCUTANEOUS TRANSLUMINAL ANGIOPLASTY (PTA) OF PERIPHERAL VESSEL N/A 2/3/2020     Procedure: PTA, PERIPHERAL VESSEL;  Surgeon: Timmy Simmons MD;  Location: [facility] CATH LAB/EP;  Service: Cardiology;  Laterality: N/A;    PORTACATH PLACEMENT Right 09/2016    RENAL ARTERY STENT Left 07/19/2017    SIGMOIDECTOMY   10/29/2019    SMALL INTESTINE SURGERY   08/23/2016    THROMBECTOMY N/A 2/3/2020     Procedure: THROMBECTOMY;  Surgeon: Timmy Simmons MD;  Location: [facility] CATH LAB/EP;  Service: Cardiology;  Laterality: N/A;    TONSILLECTOMY        VAGINAL HYSTERECTOMY W/ ANTERIOR AND POSTERIOR VAGINAL REPAIR                Family History   Problem Relation Age of Onset    Pancreatic cancer Mother      Hypertension Father      Heart attack Father        Social History           Tobacco Use    Smoking status: Never    Smokeless tobacco: Never   Substance Use  Topics    Alcohol use: No    Drug use: No       Interval History:awake and alert, s/p primary parastomal hernia repair for high-grade SBO within her parastomal hernia on 9/1. Ostomy with some stool, tolerating diet . Appreciates surgery rec's     Pending SNF placement    Review of Systems   Constitutional:  Negative for fever.   Respiratory:  Positive for cough, shortness of breath and wheezing.    Cardiovascular:  Positive for chest pain.   Gastrointestinal:  Negative for abdominal distention, abdominal pain, constipation, diarrhea, nausea and vomiting.   Neurological:  Positive for weakness.   Psychiatric/Behavioral:  Positive for confusion.    Objective:     Vital Signs (Most Recent):  Temp: 97.7 °F (36.5 °C) (10/11/22 2333)  Pulse: 87 (10/11/22 2333)  Resp: 19 (10/11/22 2333)  BP: (!) 147/68 (10/11/22 2333)  SpO2: (!) 92 % (10/11/22 2333)   Vital Signs (24h Range):  Temp:  [97.7 °F (36.5 °C)-98.7 °F (37.1 °C)] 97.7 °F (36.5 °C)  Pulse:  [] 87  Resp:  [17-31] 19  SpO2:  [92 %-100 %] 92 %  BP: (133-163)/(59-86) 147/68     Weight: 74.2 kg (163 lb 9.3 oz)  Body mass index is 34.19 kg/m².  No intake or output data in the 24 hours ending 10/11/22 2340     Physical Exam  Vitals and nursing note reviewed.   Constitutional:       Appearance: Normal appearance. She is obese. She is not ill-appearing, toxic-appearing or diaphoretic.   HENT:      Head: Normocephalic and atraumatic.      Nose:      Comments: NG tube in place     Mouth/Throat:      Mouth: Mucous membranes are dry.   Eyes:      Pupils: Pupils are equal, round, and reactive to light.   Cardiovascular:      Rate and Rhythm: Normal rate and regular rhythm.      Pulses: Normal pulses.      Heart sounds: Normal heart sounds.   Pulmonary:      Effort: Pulmonary effort is normal. No respiratory distress.      Breath sounds: Wheezing present.   Abdominal:      General: The ostomy site is clean. There is no distension.      Palpations: Abdomen is soft.       Tenderness: There is no abdominal tenderness.      Comments: Left colostomy with mild blood drainage, no gas or stool  Bowel sounds absent   Musculoskeletal:         General: No swelling. Normal range of motion.      Cervical back: Normal range of motion.   Skin:     General: Skin is warm.      Capillary Refill: Capillary refill takes 2 to 3 seconds.      Comments: Skin irritation noted around ostomy site   Neurological:      General: No focal deficit present.      Mental Status: She is alert and oriented to person, place, and time. Mental status is at baseline.   Psychiatric:         Mood and Affect: Mood normal.         Behavior: Behavior normal.       Significant Labs: ABGs:   Recent Labs   Lab 10/11/22  1121   PH 7.434   PCO2 30.4*   HCO3 20.3*   POCSATURATED 97   BE -4   PO2 84*     Blood Culture: No results for input(s): LABBLOO in the last 48 hours.  CBC:   Recent Labs   Lab 10/11/22  1040   WBC 13.77*   HGB 10.2*   HCT 31.3*          CMP:   Recent Labs   Lab 10/11/22  1040   *   K 4.3   CL 94*   CO2 24   *   BUN 12   CREATININE 0.6   CALCIUM 9.5   PROT 7.3   ALBUMIN 2.9*   BILITOT 0.6   ALKPHOS 99   AST 25   ALT 24   ANIONGAP 14       Lactic Acid:   Recent Labs   Lab 10/11/22  1040   LACTATE 0.9     Lipase: No results for input(s): LIPASE in the last 48 hours.  Lipid Panel: No results for input(s): CHOL, HDL, LDLCALC, TRIG, CHOLHDL in the last 48 hours.  Magnesium:   No results for input(s): MG in the last 48 hours.    Troponin:   Recent Labs   Lab 10/11/22  1040   TROPONINI 0.023     TSH: No results for input(s): TSH in the last 4320 hours.  Urine Culture: No results for input(s): LABURIN in the last 48 hours.  Urine Studies:   Recent Labs   Lab 10/11/22  1245   COLORU Yellow   APPEARANCEUA Clear   PHUR 7.0   SPECGRAV 1.020   PROTEINUA 2+*   GLUCUA Negative   KETONESU 1+*   BILIRUBINUA Negative   OCCULTUA 2+*   NITRITE Positive*   UROBILINOGEN Negative   LEUKOCYTESUR 1+*   RBCUA 1    WBCUA 35*   BACTERIA Rare   HYALINECASTS 0       Significant Imaging: I have reviewed all pertinent imaging results/findings within the past 24 hours.      CRANIAL NERVES     CN III, IV, VI   Pupils are equal, round, and reactive to light.    Assessment/Plan:     * Acute hypoxemic respiratory failure  Patient with Hypoxic Respiratory failure which is Acute.  she is not on home oxygen. Supplemental oxygen was provided and noted-  .   Signs/symptoms of respiratory failure include- wheezing. Contributing diagnoses includes -Labs and images were reviewed. Patient has recent VBG which was reviewed which does not show any elevated CO2.  Will treat underlying causes and adjust management of respiratory failure as follows-   - continue supplemental oxygen.  - antibiotics with cefepime to cover both her UTI and pneumonia.    Pneumonia due to infectious organism  Imaging from outside facility did show the beginnings of a pneumonia.  She does have an elevated white blood cell count and was started on cefepime in the emergency department.  - continue antibiotic therapy and plan to switch over to oral therapy in the next few days.      Urinary tract infection without hematuria  - UA with culture was concerning for urinary tract infection.  Culture sensitivities have not yet resulted.  She was started on cefepime in the emergency department which we will continue.        VTE Risk Mitigation (From admission, onward)           Ordered     apixaban tablet 2.5 mg  2 times daily         10/11/22 8589                       Keith Lamas DO  Department of Hospital Medicine   [facility]

## 2022-10-12 NOTE — HPI
This is an 86-year-old female with a history of CAD, hypertension, atrial fibrillation on Eliquis, seizure disorder, hypothyroidism, colostomy presents to the emergency department from an outside nursing facility for cough, hypoxemia, altered mental status.  She has been having a cough for approximately 2 days now.  The cough is productive.  She does feel short of breath.  She denies any recent sick contacts.  There has not been any issues with fever, chest pain, chills, abdominal pain, nausea or vomiting.  A chest x-ray at a nursing facility did show possible pneumonia and she was started on doxycycline.  She has no history of being on home oxygen supplementation and she was hypoxic in the emergency department in the high 80s.  VBG did not show any CO2 retention.  She is also noted to have an episode of AFib with RVR.  Oral dose of metoprolol was given.

## 2022-10-12 NOTE — PLAN OF CARE
Pt. AAO. Medication administered per MAR. Ostomy output monitored and recorded. Seizure precautions maintained. VS signs monitored and recorded. Safety maintained and patient free from falls. Bed at lowest position, locked, and bed alarm set. Instructed patient to call if needed, patient verbalized understanding. Call light within patient's reach. Will continue to monitor.    Problem: Adult Inpatient Plan of Care  Goal: Plan of Care Review  Outcome: Ongoing, Progressing  Goal: Patient-Specific Goal (Individualized)  Outcome: Ongoing, Progressing  Goal: Absence of Hospital-Acquired Illness or Injury  Outcome: Ongoing, Progressing  Goal: Optimal Comfort and Wellbeing  Outcome: Ongoing, Progressing

## 2022-10-12 NOTE — PLAN OF CARE
Patient on oxygen with documented flow.  Will attempt to wean per O2 order protocol.    Will continue to monitor.'

## 2022-10-12 NOTE — ASSESSMENT & PLAN NOTE
Chest x-ray showing findings suggestive of developing left lower consolidation.  Continue cefepime for now given her leukocytosis has improved and oxygen requirement has remained stable.    History recent hospitalization therefore low threshold to add MRSA coverage

## 2022-10-12 NOTE — ASSESSMENT & PLAN NOTE
- UA with culture was concerning for urinary tract infection.    - urine culture pending.  Blood cultures pending.  Continue cefepime for now until cultures result

## 2022-10-12 NOTE — PLAN OF CARE
CM met with pt -   Ms. Kiannascstephania has been a resident at [facility] for est. 5 yrs   Daughter Lorenza  ;  son Rajinder and JULISSA Anthony   at bedside   Pt and family agree to return to [facility] at d/c.    dx:  PNA, resp failure     CM will send info to [facility] per [facility]        10/12/22 1800   Discharge Planning   Assessment Type Discharge Planning Brief Assessment   Resource/Environmental Concerns none   Support Systems Children;Family members  (Daughter Lorenza  ;  son Rajinder  [phone])   Equipment Currently Used at Home none   Current Living Arrangements other (see comments)  ([facility])   Patient/Family Anticipates Transition to other (see comments)  (return to NH)   DME Needed Upon Discharge  none   Discharge Plan A Return to nursing home

## 2022-10-12 NOTE — SUBJECTIVE & OBJECTIVE
Interval History:awake and alert, s/p primary parastomal hernia repair for high-grade SBO within her parastomal hernia on 9/1. Ostomy with some stool, tolerating diet . Appreciates surgery rec's     Pending SNF placement    Review of Systems   Constitutional:  Negative for fever.   Respiratory:  Positive for cough, shortness of breath and wheezing.    Cardiovascular:  Positive for chest pain.   Gastrointestinal:  Negative for abdominal distention, abdominal pain, constipation, diarrhea, nausea and vomiting.   Neurological:  Positive for weakness.   Psychiatric/Behavioral:  Positive for confusion.    Objective:     Vital Signs (Most Recent):  Temp: 97.7 °F (36.5 °C) (10/11/22 2333)  Pulse: 87 (10/11/22 2333)  Resp: 19 (10/11/22 2333)  BP: (!) 147/68 (10/11/22 2333)  SpO2: (!) 92 % (10/11/22 2333)   Vital Signs (24h Range):  Temp:  [97.7 °F (36.5 °C)-98.7 °F (37.1 °C)] 97.7 °F (36.5 °C)  Pulse:  [] 87  Resp:  [17-31] 19  SpO2:  [92 %-100 %] 92 %  BP: (133-163)/(59-86) 147/68     Weight: 74.2 kg (163 lb 9.3 oz)  Body mass index is 34.19 kg/m².  No intake or output data in the 24 hours ending 10/11/22 2340     Physical Exam  Vitals and nursing note reviewed.   Constitutional:       Appearance: Normal appearance. She is obese. She is not ill-appearing, toxic-appearing or diaphoretic.   HENT:      Head: Normocephalic and atraumatic.      Nose:      Comments: NG tube in place     Mouth/Throat:      Mouth: Mucous membranes are dry.   Eyes:      Pupils: Pupils are equal, round, and reactive to light.   Cardiovascular:      Rate and Rhythm: Normal rate and regular rhythm.      Pulses: Normal pulses.      Heart sounds: Normal heart sounds.   Pulmonary:      Effort: Pulmonary effort is normal. No respiratory distress.      Breath sounds: Wheezing present.   Abdominal:      General: The ostomy site is clean. There is no distension.      Palpations: Abdomen is soft.      Tenderness: There is no abdominal tenderness.       Comments: Left colostomy with mild blood drainage, no gas or stool  Bowel sounds absent   Musculoskeletal:         General: No swelling. Normal range of motion.      Cervical back: Normal range of motion.   Skin:     General: Skin is warm.      Capillary Refill: Capillary refill takes 2 to 3 seconds.      Comments: Skin irritation noted around ostomy site   Neurological:      General: No focal deficit present.      Mental Status: She is alert and oriented to person, place, and time. Mental status is at baseline.   Psychiatric:         Mood and Affect: Mood normal.         Behavior: Behavior normal.       Significant Labs: ABGs:   Recent Labs   Lab 10/11/22  1121   PH 7.434   PCO2 30.4*   HCO3 20.3*   POCSATURATED 97   BE -4   PO2 84*     Blood Culture: No results for input(s): LABBLOO in the last 48 hours.  CBC:   Recent Labs   Lab 10/11/22  1040   WBC 13.77*   HGB 10.2*   HCT 31.3*          CMP:   Recent Labs   Lab 10/11/22  1040   *   K 4.3   CL 94*   CO2 24   *   BUN 12   CREATININE 0.6   CALCIUM 9.5   PROT 7.3   ALBUMIN 2.9*   BILITOT 0.6   ALKPHOS 99   AST 25   ALT 24   ANIONGAP 14       Lactic Acid:   Recent Labs   Lab 10/11/22  1040   LACTATE 0.9     Lipase: No results for input(s): LIPASE in the last 48 hours.  Lipid Panel: No results for input(s): CHOL, HDL, LDLCALC, TRIG, CHOLHDL in the last 48 hours.  Magnesium:   No results for input(s): MG in the last 48 hours.    Troponin:   Recent Labs   Lab 10/11/22  1040   TROPONINI 0.023     TSH: No results for input(s): TSH in the last 4320 hours.  Urine Culture: No results for input(s): LABURIN in the last 48 hours.  Urine Studies:   Recent Labs   Lab 10/11/22  1245   COLORU Yellow   APPEARANCEUA Clear   PHUR 7.0   SPECGRAV 1.020   PROTEINUA 2+*   GLUCUA Negative   KETONESU 1+*   BILIRUBINUA Negative   OCCULTUA 2+*   NITRITE Positive*   UROBILINOGEN Negative   LEUKOCYTESUR 1+*   RBCUA 1   WBCUA 35*   BACTERIA Rare   HYALINECASTS 0        Significant Imaging: I have reviewed all pertinent imaging results/findings within the past 24 hours.      CRANIAL NERVES     CN III, IV, VI   Pupils are equal, round, and reactive to light.

## 2022-10-13 ENCOUNTER — PATIENT OUTREACH (OUTPATIENT)
Dept: ADMINISTRATIVE | Facility: OTHER | Age: 87
End: 2022-10-13
Payer: MEDICARE

## 2022-10-13 PROBLEM — I48.91 ATRIAL FIBRILLATION: Status: ACTIVE | Noted: 2019-12-28

## 2022-10-13 LAB
ALBUMIN SERPL BCP-MCNC: 2.5 G/DL (ref 3.5–5.2)
ALP SERPL-CCNC: 96 U/L (ref 55–135)
ALT SERPL W/O P-5'-P-CCNC: 44 U/L (ref 10–44)
ANION GAP SERPL CALC-SCNC: 9 MMOL/L (ref 8–16)
AST SERPL-CCNC: 41 U/L (ref 10–40)
BASOPHILS # BLD AUTO: 0.03 K/UL (ref 0–0.2)
BASOPHILS NFR BLD: 0.4 % (ref 0–1.9)
BILIRUB SERPL-MCNC: 0.3 MG/DL (ref 0.1–1)
BUN SERPL-MCNC: 15 MG/DL (ref 8–23)
CALCIUM SERPL-MCNC: 8.8 MG/DL (ref 8.7–10.5)
CHLORIDE SERPL-SCNC: 96 MMOL/L (ref 95–110)
CO2 SERPL-SCNC: 25 MMOL/L (ref 23–29)
CREAT SERPL-MCNC: 0.6 MG/DL (ref 0.5–1.4)
DIFFERENTIAL METHOD: ABNORMAL
EOSINOPHIL # BLD AUTO: 0.3 K/UL (ref 0–0.5)
EOSINOPHIL NFR BLD: 4.5 % (ref 0–8)
ERYTHROCYTE [DISTWIDTH] IN BLOOD BY AUTOMATED COUNT: 13.2 % (ref 11.5–14.5)
EST. GFR  (NO RACE VARIABLE): >60 ML/MIN/1.73 M^2
GLUCOSE SERPL-MCNC: 120 MG/DL (ref 70–110)
HCT VFR BLD AUTO: 28.3 % (ref 37–48.5)
HGB BLD-MCNC: 9.2 G/DL (ref 12–16)
IMM GRANULOCYTES # BLD AUTO: 0.03 K/UL (ref 0–0.04)
IMM GRANULOCYTES NFR BLD AUTO: 0.4 % (ref 0–0.5)
LYMPHOCYTES # BLD AUTO: 1.6 K/UL (ref 1–4.8)
LYMPHOCYTES NFR BLD: 21.4 % (ref 18–48)
MCH RBC QN AUTO: 32.3 PG (ref 27–31)
MCHC RBC AUTO-ENTMCNC: 32.5 G/DL (ref 32–36)
MCV RBC AUTO: 99 FL (ref 82–98)
MONOCYTES # BLD AUTO: 0.7 K/UL (ref 0.3–1)
MONOCYTES NFR BLD: 9.7 % (ref 4–15)
NEUTROPHILS # BLD AUTO: 4.8 K/UL (ref 1.8–7.7)
NEUTROPHILS NFR BLD: 63.6 % (ref 38–73)
NRBC BLD-RTO: 0 /100 WBC
PLATELET # BLD AUTO: 210 K/UL (ref 150–450)
PMV BLD AUTO: 10 FL (ref 9.2–12.9)
POTASSIUM SERPL-SCNC: 4.3 MMOL/L (ref 3.5–5.1)
PROT SERPL-MCNC: 6.2 G/DL (ref 6–8.4)
RBC # BLD AUTO: 2.85 M/UL (ref 4–5.4)
SODIUM SERPL-SCNC: 130 MMOL/L (ref 136–145)
WBC # BLD AUTO: 7.53 K/UL (ref 3.9–12.7)

## 2022-10-13 PROCEDURE — 36415 COLL VENOUS BLD VENIPUNCTURE: CPT | Performed by: INTERNAL MEDICINE

## 2022-10-13 PROCEDURE — 63600175 PHARM REV CODE 636 W HCPCS: Performed by: EMERGENCY MEDICINE

## 2022-10-13 PROCEDURE — 80053 COMPREHEN METABOLIC PANEL: CPT | Performed by: INTERNAL MEDICINE

## 2022-10-13 PROCEDURE — 21400001 HC TELEMETRY ROOM

## 2022-10-13 PROCEDURE — 63600175 PHARM REV CODE 636 W HCPCS: Performed by: STUDENT IN AN ORGANIZED HEALTH CARE EDUCATION/TRAINING PROGRAM

## 2022-10-13 PROCEDURE — 25000003 PHARM REV CODE 250: Performed by: STUDENT IN AN ORGANIZED HEALTH CARE EDUCATION/TRAINING PROGRAM

## 2022-10-13 PROCEDURE — 85025 COMPLETE CBC W/AUTO DIFF WBC: CPT | Performed by: INTERNAL MEDICINE

## 2022-10-13 PROCEDURE — 27000221 HC OXYGEN, UP TO 24 HOURS

## 2022-10-13 PROCEDURE — 94761 N-INVAS EAR/PLS OXIMETRY MLT: CPT

## 2022-10-13 PROCEDURE — 94799 UNLISTED PULMONARY SVC/PX: CPT

## 2022-10-13 PROCEDURE — 99900035 HC TECH TIME PER 15 MIN (STAT)

## 2022-10-13 PROCEDURE — 25000003 PHARM REV CODE 250: Performed by: INTERNAL MEDICINE

## 2022-10-13 RX ADMIN — PRAVASTATIN SODIUM 20 MG: 20 TABLET ORAL at 09:10

## 2022-10-13 RX ADMIN — LOSARTAN POTASSIUM 50 MG: 50 TABLET, FILM COATED ORAL at 09:10

## 2022-10-13 RX ADMIN — APIXABAN 2.5 MG: 2.5 TABLET, FILM COATED ORAL at 09:10

## 2022-10-13 RX ADMIN — VANCOMYCIN HYDROCHLORIDE 1750 MG: 500 INJECTION, POWDER, LYOPHILIZED, FOR SOLUTION INTRAVENOUS at 04:10

## 2022-10-13 RX ADMIN — CEFEPIME 2 G: 2 INJECTION, POWDER, FOR SOLUTION INTRAVENOUS at 04:10

## 2022-10-13 RX ADMIN — PHENOBARBITAL 97.2 MG: 32.4 TABLET ORAL at 09:10

## 2022-10-13 RX ADMIN — METOPROLOL TARTRATE 25 MG: 25 TABLET, FILM COATED ORAL at 09:10

## 2022-10-13 RX ADMIN — POTASSIUM CHLORIDE 20 MEQ: 1500 TABLET, EXTENDED RELEASE ORAL at 09:10

## 2022-10-13 RX ADMIN — OXCARBAZEPINE 150 MG: 150 TABLET, FILM COATED ORAL at 09:10

## 2022-10-13 RX ADMIN — POLYETHYLENE GLYCOL 3350 17 G: 17 POWDER, FOR SOLUTION ORAL at 09:10

## 2022-10-13 RX ADMIN — FUROSEMIDE 20 MG: 20 TABLET ORAL at 09:10

## 2022-10-13 RX ADMIN — BENZONATATE 200 MG: 100 CAPSULE ORAL at 03:10

## 2022-10-13 RX ADMIN — MICONAZOLE NITRATE 2 % TOPICAL POWDER: at 09:10

## 2022-10-13 RX ADMIN — BENZONATATE 200 MG: 100 CAPSULE ORAL at 09:10

## 2022-10-13 RX ADMIN — CEFEPIME 2 G: 2 INJECTION, POWDER, FOR SOLUTION INTRAVENOUS at 03:10

## 2022-10-13 RX ADMIN — LEVOTHYROXINE SODIUM 125 MCG: 0.12 TABLET ORAL at 05:10

## 2022-10-13 RX ADMIN — CHOLECALCIFEROL TAB 25 MCG (1000 UNIT) 1000 UNITS: 25 TAB at 09:10

## 2022-10-13 NOTE — PLAN OF CARE
Chart reviewed - case discussed in am MDR    Ms. Garcia has been a resident at [facility] for est. 5 yrs     CM met with Daughter Lorenza  [phone];  son Rajinder and DIL Gabrielle  [phone] yesterday  Pt and family agree to return to [facility] at d/c.    dx:  PNA, resp failure    IV abx, A fib.  SOB improved  -   Info sent  to [facility] per Hossein - pt will be accepted back at d/c       10/13/22 8488   Post-Acute Status   Post-Acute Authorization Placement   Post-Acute Placement Status Referrals Sent   Discharge Delays (!) Other  (pending medical stability)   Discharge Plan   Discharge Plan A Return to nursing home  ([facility])

## 2022-10-13 NOTE — ASSESSMENT & PLAN NOTE
Urine cultures growing Enterococcus and another Gram-negative bacilli    Continue cefepime for gram negative bacilli.    Add vancomycin IV as she has anaphylactic allergy to penicillin to cover Enterococcus. Today will be day 1.  Will treat for total 3 days for uncomplicated UTI.  If she were to be discharged prior to this, discussed with Pharmacy nitrofurantoin would be a PO alternative

## 2022-10-13 NOTE — ASSESSMENT & PLAN NOTE
Patient with Paroxysmal (<7 days) atrial fibrillation which is controlled currently with Beta Blocker. Patient is currently in sinus rhythm.[id number] Score: 4. HASBLED Score: . Anticoagulation indicated. Anticoagulation done with eliquis.

## 2022-10-13 NOTE — ASSESSMENT & PLAN NOTE
Chest x-ray showing findings suggestive of developing left lower consolidation.    Treatment as above

## 2022-10-13 NOTE — SUBJECTIVE & OBJECTIVE
Interval History:  No acute events overnight.  Reports coughing all night but her shortness of breath is much improved today.  Complains generalized weakness.  Sitting in chair today and feeling better overall.    Review of Systems   Constitutional:  Positive for activity change, appetite change and fatigue.   Eyes:  Negative for visual disturbance.   Respiratory:  Positive for shortness of breath. Negative for cough.    Cardiovascular:  Negative for chest pain and leg swelling.   Gastrointestinal:  Negative for abdominal pain, diarrhea, nausea and vomiting.   Genitourinary:  Negative for dysuria, frequency and urgency.   Musculoskeletal:  Negative for back pain.   Neurological:  Negative for light-headedness and headaches.   Psychiatric/Behavioral:  Negative for confusion.    Objective:     Vital Signs (Most Recent):  Temp: 98 °F (36.7 °C) (10/13/22 1238)  Pulse: 69 (10/13/22 1238)  Resp: 18 (10/13/22 1238)  BP: (!) 178/72 (10/13/22 1238)  SpO2: 99 % (10/13/22 1238) Vital Signs (24h Range):  Temp:  [97.8 °F (36.6 °C)-98.1 °F (36.7 °C)] 98 °F (36.7 °C)  Pulse:  [] 69  Resp:  [17-19] 18  SpO2:  [93 %-99 %] 99 %  BP: (129-178)/(60-72) 178/72     Weight: 74.7 kg (164 lb 10.9 oz)  Body mass index is 34.42 kg/m².    Intake/Output Summary (Last 24 hours) at 10/13/2022 1503  Last data filed at 10/13/2022 0529  Gross per 24 hour   Intake 180 ml   Output 775 ml   Net -595 ml        Physical Exam  Vitals and nursing note reviewed.   Constitutional:       General: She is not in acute distress.     Appearance: She is obese.   HENT:      Head: Normocephalic and atraumatic.      Mouth/Throat:      Mouth: Mucous membranes are moist.   Eyes:      General: No scleral icterus.  Cardiovascular:      Rate and Rhythm: Normal rate and regular rhythm.      Pulses: Normal pulses.      Heart sounds: Normal heart sounds. No murmur heard.  Pulmonary:      Effort: Pulmonary effort is normal. No respiratory distress.      Breath sounds:  Rales present. No wheezing.   Abdominal:      Palpations: Abdomen is soft.      Tenderness: There is no abdominal tenderness.      Comments: Colostomy bag in place with brown stool   Musculoskeletal:      Cervical back: Neck supple.      Right lower leg: No edema.      Left lower leg: No edema.   Skin:     General: Skin is warm and dry.      Findings: No bruising or rash.   Neurological:      General: No focal deficit present.      Mental Status: She is alert and oriented to person, place, and time.   Psychiatric:         Mood and Affect: Mood normal.       Significant Labs: All pertinent labs within the past 24 hours have been reviewed.    Significant Imaging: I have reviewed all pertinent imaging results/findings within the past 24 hours.

## 2022-10-13 NOTE — PROGRESS NOTES
[facility] Medicine  Progress Note    Patient Name: Annette Garcia  MRN: [id number]  Patient Class: IP- Inpatient   Admission Date: 10/11/2022  Length of Stay: 2 days  Attending Physician: Clarice Holloway MD  Primary Care Provider: Jose Valles MD        Subjective:     Principal Problem:Acute hypoxemic respiratory failure        HPI:  This is an 86-year-old female with a history of CAD, hypertension, atrial fibrillation on Eliquis, seizure disorder, hypothyroidism, colostomy presents to the emergency department from an outside nursing facility for cough, hypoxemia, altered mental status.  She has been having a cough for approximately 2 days now.  The cough is productive.  She does feel short of breath.  She denies any recent sick contacts.  There has not been any issues with fever, chest pain, chills, abdominal pain, nausea or vomiting.  A chest x-ray at a nursing facility did show possible pneumonia and she was started on doxycycline.  She has no history of being on home oxygen supplementation and she was hypoxic in the emergency department in the high 80s.  VBG did not show any CO2 retention.  She is also noted to have an episode of AFib with RVR.  Oral dose of metoprolol was given.      Overview/Hospital Course:  No notes on file    Interval History:  No acute events overnight.  Reports coughing all night but her shortness of breath is much improved today.  Complains generalized weakness.  Sitting in chair today and feeling better overall.    Review of Systems   Constitutional:  Positive for activity change, appetite change and fatigue.   Eyes:  Negative for visual disturbance.   Respiratory:  Positive for shortness of breath. Negative for cough.    Cardiovascular:  Negative for chest pain and leg swelling.   Gastrointestinal:  Negative for abdominal pain, diarrhea, nausea and vomiting.   Genitourinary:  Negative for dysuria, frequency and urgency.   Musculoskeletal:  Negative for back  pain.   Neurological:  Negative for light-headedness and headaches.   Psychiatric/Behavioral:  Negative for confusion.    Objective:     Vital Signs (Most Recent):  Temp: 98 °F (36.7 °C) (10/13/22 1238)  Pulse: 69 (10/13/22 1238)  Resp: 18 (10/13/22 1238)  BP: (!) 178/72 (10/13/22 1238)  SpO2: 99 % (10/13/22 1238) Vital Signs (24h Range):  Temp:  [97.8 °F (36.6 °C)-98.1 °F (36.7 °C)] 98 °F (36.7 °C)  Pulse:  [] 69  Resp:  [17-19] 18  SpO2:  [93 %-99 %] 99 %  BP: (129-178)/(60-72) 178/72     Weight: 74.7 kg (164 lb 10.9 oz)  Body mass index is 34.42 kg/m².    Intake/Output Summary (Last 24 hours) at 10/13/2022 1503  Last data filed at 10/13/2022 0529  Gross per 24 hour   Intake 180 ml   Output 775 ml   Net -595 ml        Physical Exam  Vitals and nursing note reviewed.   Constitutional:       General: She is not in acute distress.     Appearance: She is obese.   HENT:      Head: Normocephalic and atraumatic.      Mouth/Throat:      Mouth: Mucous membranes are moist.   Eyes:      General: No scleral icterus.  Cardiovascular:      Rate and Rhythm: Normal rate and regular rhythm.      Pulses: Normal pulses.      Heart sounds: Normal heart sounds. No murmur heard.  Pulmonary:      Effort: Pulmonary effort is normal. No respiratory distress.      Breath sounds: Rales present. No wheezing.   Abdominal:      Palpations: Abdomen is soft.      Tenderness: There is no abdominal tenderness.      Comments: Colostomy bag in place with brown stool   Musculoskeletal:      Cervical back: Neck supple.      Right lower leg: No edema.      Left lower leg: No edema.   Skin:     General: Skin is warm and dry.      Findings: No bruising or rash.   Neurological:      General: No focal deficit present.      Mental Status: She is alert and oriented to person, place, and time.   Psychiatric:         Mood and Affect: Mood normal.       Significant Labs: All pertinent labs within the past 24 hours have been reviewed.    Significant  Imaging: I have reviewed all pertinent imaging results/findings within the past 24 hours.      Assessment/Plan:      * Acute hypoxemic respiratory failure  Patient with Hypoxic Respiratory failure which is Acute.  she is not on home oxygen. Supplemental oxygen was provided and noted-  .   Signs/symptoms of respiratory failure include- wheezing. Contributing diagnoses includes -Labs and images were reviewed. Patient has recent VBG which was reviewed which does not show any elevated CO2.  Will treat underlying causes and adjust management of respiratory failure as follows-   - continue supplemental oxygen.  - continue cefepime.  Day 3 of antibiotics    Pneumonia due to infectious organism  Chest x-ray showing findings suggestive of developing left lower consolidation.    Treatment as above          Urinary tract infection without hematuria  Urine cultures growing Enterococcus and another Gram-negative bacilli    Continue cefepime for gram negative bacilli.    Add vancomycin IV as she has anaphylactic allergy to penicillin to cover Enterococcus. Today will be day 1.  Will treat for total 3 days for uncomplicated UTI.  If she were to be discharged prior to this, discussed with Pharmacy nitrofurantoin would be a PO alternative      Colostomy present  Recent history of high-grade SBO s/p primary parastomal hernia repair and ostomy on 09/2022      Atrial fibrillation  Patient with Paroxysmal (<7 days) atrial fibrillation which is controlled currently with Beta Blocker. Patient is currently in sinus rhythm.[id number] Score: 4. HASBLED Score: . Anticoagulation indicated. Anticoagulation done with eliquis.        Hypothyroidism  Continue Synthroid      Seizure disorder  Stable.  Continue home medications      VTE Risk Mitigation (From admission, onward)         Ordered     apixaban tablet 2.5 mg  2 times daily         10/11/22 2106                Discharge Planning   JESSICA:      Code Status: DNR   Is the patient medically ready  for discharge?:     Reason for patient still in hospital (select all that apply): Patient trending condition and Laboratory test  Discharge Plan A: Return to nursing home        Clarice Holloway MD  Department of Hospital Medicine   [facility]

## 2022-10-13 NOTE — PROGRESS NOTES
IP Liaison - Initial Visit Note    Patient: Annette Garcia  MRN:  [id number]  Date of Service:  10/13/2022  Completed by:  PEPITO David    Reason for Visit   Patient presents with    IP Liaison Initial Visit       RSW met with patient at bedside in order to complete SDOH questionnaire and liaison assessment.  Pt has identified no social barriers to care. Pt is a resident of [facility], all pt social needs are being met at [facility]. Per pt, pt is not in need of resources at this time.     The following were addressed during this visit:  - Review SDOH Questions   - Complete patient assessment   - Complete initial visit with patient        Patient Summary     IP Liaison Patient Assessment    General  Level of Caregiver support: Member independent and does not need caregiver assistance  Have you had to make a decision between paying for any of the following in the last 2 months?: None  Transportation means: Other  Employment status: Retired and not working  Assessments  Was the PHQ Depression Screening completed this visit?: No  Was the BRITNEY-7 Screening completed this visit?: No       PEPITO David

## 2022-10-13 NOTE — CONSULTS
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       Social History     Socioeconomic History    Marital status:    Tobacco Use    Smoking status: Never    Smokeless tobacco: Never   Substance and Sexual Activity    Alcohol use: No    Drug use: No     Social Determinants of Health     Financial Resource Strain: Low Risk     Difficulty of Paying Living Expenses: Not hard at all   Food Insecurity: No Food Insecurity    Worried About Running Out of Food in the Last Year: Never true    Ran Out of Food in the Last Year: Never true   Transportation Needs: No Transportation Needs    Lack of Transportation (Medical): No    Lack of Transportation (Non-Medical): No   Physical Activity: Inactive    Days of Exercise per Week: 0 days    Minutes of Exercise per Session: 0 min   Stress: No Stress Concern Present    Feeling of Stress : Not at all   Housing Stability: Low Risk     Unable to Pay for Housing in the Last Year: No    Number of Places Lived in the Last Year: 1    Unstable Housing in the Last Year: No       Precautions:     Allergies as of 10/11/2022 - Reviewed 10/11/2022   Allergen Reaction Noted    Adhesive Itching and Blisters 09/27/2016    Penicillins Anaphylaxis 01/12/2015    Tramadol Hives 09/16/2016    Avelox [moxifloxacin] Rash 08/20/2016    Amoxil [amoxicillin]   03/24/2015    Aspridrox [aspirin, buffered]  01/12/2015    Codeine Other (See Comments) 03/24/2015    Keflex [cephalexin]  03/24/2015    Norvasc [amlodipine]  03/24/2015    Red dye Hives 09/22/2016    Robitussin [guaifenesin]  08/20/2019    Sulfa (sulfonamide antibiotics)  03/24/2015    Tylenol [acetaminophen]  03/24/2015    Vicks vaporub [camphor-eucalyptus oil-menthol]  03/14/2017       [facility] Assessment Details/Treatment     [place] colostomy pouch intact with no leaks. Small amount of brown mushy stool in pouch. Mild denuded skin distally. Recommend nursing to leave pouch intact and continue with routine ostomy care change pouch 1-2x/week.  Call ostomy nurse for any complications with pouch changes.     10/13/2022

## 2022-10-13 NOTE — PLAN OF CARE
Pt. AAOx4. Medication administered per MAR. VS signs monitored and recorded. Seizure precautions maintained. Safety maintained and patient free from falls. Bed at lowest position, locked, and bed alarm set. Instructed patient to call if needed, patient verbalized understanding. Call light within patient's reach. Will continue to monitor.    Problem: Adult Inpatient Plan of Care  Goal: Plan of Care Review  Outcome: Ongoing, Progressing  Goal: Patient-Specific Goal (Individualized)  Outcome: Ongoing, Progressing  Goal: Absence of Hospital-Acquired Illness or Injury  Outcome: Ongoing, Progressing  Goal: Optimal Comfort and Wellbeing  Outcome: Ongoing, Progressing

## 2022-10-13 NOTE — ASSESSMENT & PLAN NOTE
Patient with Hypoxic Respiratory failure which is Acute.  she is not on home oxygen. Supplemental oxygen was provided and noted-  .   Signs/symptoms of respiratory failure include- wheezing. Contributing diagnoses includes -Labs and images were reviewed. Patient has recent VBG which was reviewed which does not show any elevated CO2.  Will treat underlying causes and adjust management of respiratory failure as follows-   - continue supplemental oxygen.  - continue cefepime.  Day 3 of antibiotics

## 2022-10-13 NOTE — PROGRESS NOTES
Pharmacokinetic Initial Assessment: IV Vancomycin    Assessment/Plan:    Initiate intravenous vancomycin with loading dose of 1750 mg once followed by a maintenance dose of vancomycin 1500mg IV every 24 hours  Desired empiric serum trough concentration is 10 to 15 mcg/mL  Draw vancomycin trough level 60 min prior to third dose on 10/15 at approximately 1300  Pharmacy will continue to follow and monitor vancomycin.      Please contact pharmacy at extension [phone] with any questions regarding this assessment.     Thank you for the consult,   Ivette Dang       Patient brief summary:  Annette Garcia is a 87 y.o. female initiated on antimicrobial therapy with IV Vancomycin for treatment of suspected urinary tract infection    Drug Allergies:   Review of patient's allergies indicates:   Allergen Reactions    Adhesive Itching and Blisters    Penicillins Anaphylaxis    Tramadol Hives    Avelox [moxifloxacin] Rash     Facial and arm itching and redness. Pt states throat closes when given.    Amoxil [amoxicillin]     Aspridrox [aspirin, buffered]     Codeine Other (See Comments)     Throat swelling    Keflex [cephalexin]      Tolerated cefepime and cefazolin    Norvasc [amlodipine]     Red dye Hives    Robitussin [guaifenesin]     Sulfa (sulfonamide antibiotics)     Tylenol [acetaminophen]      Has reaction to Tylenol with red dye and unable to take Extra Strength Tylenol/ CAN ONLY TOLERATE REG STRENGTH TYLENOL    Vicks vaporub [camphor-eucalyptus oil-menthol]        Actual Body Weight:   74.7 kg    Renal Function:   Estimated Creatinine Clearance: 62.9 mL/min (based on SCr of 0.6 mg/dL).,     Dialysis Method (if applicable):  N/A    CBC (last 72 hours):  Recent Labs   Lab Result Units 10/11/22  1040 10/12/22  0505 10/13/22  0530   WBC K/uL 13.77* 7.58 7.53   Hemoglobin g/dL 10.2* 9.6* 9.2*   Hematocrit % 31.3* 28.7* 28.3*   Platelets K/uL 193 185 210   Gran % % 71.7 65.7 63.6   Lymph % % 19.6 22.6 21.4   Mono % % 7.9  8.3 9.7   Eosinophil % % 0.3 2.5 4.5   Basophil % % 0.2 0.5 0.4   Differential Method  Automated Automated Automated       Metabolic Panel (last 72 hours):  Recent Labs   Lab Result Units 10/11/22  1040 10/11/22  1245 10/12/22  0505 10/13/22  0530   Sodium mmol/L 132*  --  133* 130*   Potassium mmol/L 4.3  --  4.4 4.3   Chloride mmol/L 94*  --  97 96   CO2 mmol/L 24  --  28 25   Glucose mg/dL 116*  --  111* 120*   Glucose, UA   --  Negative  --   --    BUN mg/dL 12  --  16 15   Creatinine mg/dL 0.6  --  0.6 0.6   Albumin g/dL 2.9*  --  2.5* 2.5*   Total Bilirubin mg/dL 0.6  --  0.5 0.3   Alkaline Phosphatase U/L 99  --  88 96   AST U/L 25  --  38 41*   ALT U/L 24  --  33 44       Drug levels (last 3 results):  No results for input(s): VANCOMYCINRA, VANCORANDOM, VANCOMYCINPE, VANCOPEAK, VANCOMYCINTR, VANCOTROUGH in the last 72 hours.    Microbiologic Results:  Microbiology Results (last 7 days)       Procedure Component Value Units Date/Time    Urine culture [[id number]]  (Abnormal) Collected: 10/11/22 1245    Order Status: Completed Specimen: Urine Updated: 10/13/22 0232     Urine Culture, Routine ENTEROCOCCUS SPECIES  50,000 - 99,999 cfu/ml  Identification and susceptibility pending        GRAM NEGATIVE SAMEERA  10,000 - 49,999 cfu/ml  Identification and susceptibility pending      Narrative:      Specimen Source->Urine    Blood culture #1 [[id number]] Collected: 10/12/22 0505    Order Status: Completed Specimen: Blood from Antecubital, Right Hand Updated: 10/13/22 0115     Blood Culture, Routine No Growth to date    Blood culture #2 [[id number]] Collected: 10/11/22 1041    Order Status: Completed Specimen: Blood from Peripheral, Forearm, Right Updated: 10/12/22 1812     Blood Culture, Routine No Growth to date      No Growth to date    Influenza A & B by Molecular [[id number]] Collected: 10/11/22 1059    Order Status: Completed Specimen: Nasopharyngeal Swab Updated: 10/11/22 1326     Influenza A, Molecular Negative      Influenza B, Molecular Negative     Flu A & B Source Nasal swab

## 2022-10-14 ENCOUNTER — PATIENT OUTREACH (OUTPATIENT)
Dept: ADMINISTRATIVE | Facility: OTHER | Age: 87
End: 2022-10-14
Payer: MEDICARE

## 2022-10-14 LAB
ALBUMIN SERPL BCP-MCNC: 2.6 G/DL (ref 3.5–5.2)
ALP SERPL-CCNC: 102 U/L (ref 55–135)
ALT SERPL W/O P-5'-P-CCNC: 42 U/L (ref 10–44)
ANION GAP SERPL CALC-SCNC: 8 MMOL/L (ref 8–16)
AST SERPL-CCNC: 30 U/L (ref 10–40)
BASOPHILS # BLD AUTO: 0.04 K/UL (ref 0–0.2)
BASOPHILS NFR BLD: 0.6 % (ref 0–1.9)
BILIRUB SERPL-MCNC: 0.3 MG/DL (ref 0.1–1)
BUN SERPL-MCNC: 12 MG/DL (ref 8–23)
CALCIUM SERPL-MCNC: 9 MG/DL (ref 8.7–10.5)
CHLORIDE SERPL-SCNC: 96 MMOL/L (ref 95–110)
CO2 SERPL-SCNC: 27 MMOL/L (ref 23–29)
CREAT SERPL-MCNC: 0.6 MG/DL (ref 0.5–1.4)
DIFFERENTIAL METHOD: ABNORMAL
EOSINOPHIL # BLD AUTO: 0.4 K/UL (ref 0–0.5)
EOSINOPHIL NFR BLD: 5.3 % (ref 0–8)
ERYTHROCYTE [DISTWIDTH] IN BLOOD BY AUTOMATED COUNT: 13.2 % (ref 11.5–14.5)
EST. GFR  (NO RACE VARIABLE): >60 ML/MIN/1.73 M^2
GLUCOSE SERPL-MCNC: 111 MG/DL (ref 70–110)
HCT VFR BLD AUTO: 29.3 % (ref 37–48.5)
HGB BLD-MCNC: 9.6 G/DL (ref 12–16)
IMM GRANULOCYTES # BLD AUTO: 0.03 K/UL (ref 0–0.04)
IMM GRANULOCYTES NFR BLD AUTO: 0.4 % (ref 0–0.5)
LYMPHOCYTES # BLD AUTO: 2.2 K/UL (ref 1–4.8)
LYMPHOCYTES NFR BLD: 31.3 % (ref 18–48)
MCH RBC QN AUTO: 32.2 PG (ref 27–31)
MCHC RBC AUTO-ENTMCNC: 32.8 G/DL (ref 32–36)
MCV RBC AUTO: 98 FL (ref 82–98)
MONOCYTES # BLD AUTO: 0.7 K/UL (ref 0.3–1)
MONOCYTES NFR BLD: 9.5 % (ref 4–15)
NEUTROPHILS # BLD AUTO: 3.7 K/UL (ref 1.8–7.7)
NEUTROPHILS NFR BLD: 52.9 % (ref 38–73)
NRBC BLD-RTO: 0 /100 WBC
PLATELET # BLD AUTO: 237 K/UL (ref 150–450)
PMV BLD AUTO: 10 FL (ref 9.2–12.9)
POTASSIUM SERPL-SCNC: 4.4 MMOL/L (ref 3.5–5.1)
PROT SERPL-MCNC: 6.5 G/DL (ref 6–8.4)
RBC # BLD AUTO: 2.98 M/UL (ref 4–5.4)
SODIUM SERPL-SCNC: 131 MMOL/L (ref 136–145)
WBC # BLD AUTO: 6.94 K/UL (ref 3.9–12.7)

## 2022-10-14 PROCEDURE — 36415 COLL VENOUS BLD VENIPUNCTURE: CPT | Performed by: INTERNAL MEDICINE

## 2022-10-14 PROCEDURE — 97162 PT EVAL MOD COMPLEX 30 MIN: CPT | Performed by: PHYSICAL THERAPIST

## 2022-10-14 PROCEDURE — 85025 COMPLETE CBC W/AUTO DIFF WBC: CPT | Performed by: INTERNAL MEDICINE

## 2022-10-14 PROCEDURE — 21400001 HC TELEMETRY ROOM

## 2022-10-14 PROCEDURE — 25000003 PHARM REV CODE 250: Performed by: STUDENT IN AN ORGANIZED HEALTH CARE EDUCATION/TRAINING PROGRAM

## 2022-10-14 PROCEDURE — 63600175 PHARM REV CODE 636 W HCPCS: Performed by: EMERGENCY MEDICINE

## 2022-10-14 PROCEDURE — 80053 COMPREHEN METABOLIC PANEL: CPT | Performed by: INTERNAL MEDICINE

## 2022-10-14 PROCEDURE — 27000221 HC OXYGEN, UP TO 24 HOURS

## 2022-10-14 PROCEDURE — 94761 N-INVAS EAR/PLS OXIMETRY MLT: CPT

## 2022-10-14 PROCEDURE — 97530 THERAPEUTIC ACTIVITIES: CPT | Performed by: PHYSICAL THERAPIST

## 2022-10-14 PROCEDURE — 94799 UNLISTED PULMONARY SVC/PX: CPT

## 2022-10-14 PROCEDURE — 63600175 PHARM REV CODE 636 W HCPCS: Performed by: STUDENT IN AN ORGANIZED HEALTH CARE EDUCATION/TRAINING PROGRAM

## 2022-10-14 PROCEDURE — 25000003 PHARM REV CODE 250: Performed by: INTERNAL MEDICINE

## 2022-10-14 PROCEDURE — 99900035 HC TECH TIME PER 15 MIN (STAT)

## 2022-10-14 RX ADMIN — OXCARBAZEPINE 150 MG: 150 TABLET, FILM COATED ORAL at 09:10

## 2022-10-14 RX ADMIN — CEFEPIME 2 G: 2 INJECTION, POWDER, FOR SOLUTION INTRAVENOUS at 03:10

## 2022-10-14 RX ADMIN — METOPROLOL TARTRATE 25 MG: 25 TABLET, FILM COATED ORAL at 09:10

## 2022-10-14 RX ADMIN — MICONAZOLE NITRATE 2 % TOPICAL POWDER: at 09:10

## 2022-10-14 RX ADMIN — POLYETHYLENE GLYCOL 3350 17 G: 17 POWDER, FOR SOLUTION ORAL at 09:10

## 2022-10-14 RX ADMIN — BENZONATATE 200 MG: 100 CAPSULE ORAL at 11:10

## 2022-10-14 RX ADMIN — PRAVASTATIN SODIUM 20 MG: 20 TABLET ORAL at 09:10

## 2022-10-14 RX ADMIN — POTASSIUM CHLORIDE 20 MEQ: 1500 TABLET, EXTENDED RELEASE ORAL at 09:10

## 2022-10-14 RX ADMIN — FUROSEMIDE 20 MG: 20 TABLET ORAL at 09:10

## 2022-10-14 RX ADMIN — SODIUM CHLORIDE: 0.9 INJECTION, SOLUTION INTRAVENOUS at 03:10

## 2022-10-14 RX ADMIN — VANCOMYCIN HYDROCHLORIDE 1500 MG: 1.5 INJECTION, POWDER, LYOPHILIZED, FOR SOLUTION INTRAVENOUS at 03:10

## 2022-10-14 RX ADMIN — APIXABAN 2.5 MG: 2.5 TABLET, FILM COATED ORAL at 09:10

## 2022-10-14 RX ADMIN — LEVOTHYROXINE SODIUM 125 MCG: 0.12 TABLET ORAL at 05:10

## 2022-10-14 RX ADMIN — CHOLECALCIFEROL TAB 25 MCG (1000 UNIT) 1000 UNITS: 25 TAB at 09:10

## 2022-10-14 RX ADMIN — PHENOBARBITAL 97.2 MG: 32.4 TABLET ORAL at 09:10

## 2022-10-14 NOTE — PLAN OF CARE
went to meet with patient this morning. No family at bedside. Patient is a resident of [facility] and will return at discharge. Per morning report, still waiting on culture results. I spoke with Millie with [facility]. I told her will send updated clinicals via [facility]. I also updated her on patient. She confirmed unable to accept patient over weekend if new medications started. Patient's daughter Beatris called me for an update and all questions answered. Patient/Daughter encouraged to call with any questions or concerns.  will continue to follow patient through transitions of care and assist with any discharge needs.      Patient Contacts    Name Relation Home Work Mobile   Beatris Richey Daughter  [phone]   Lorenza Richey Daughter [phone]   Gabrielle Ramirez Daughter   [phone]   RadhaErnesto Son   [phone]   RadhaAshley Relative   [phone]     [facility]    [phone] [address] [place] [address]      Next Steps: Follow up  Appointment:   Instructions: Nursing Home        10/14/22 7528   Discharge Reassessment   Assessment Type Discharge Planning Reassessment   Did the patient's condition or plan change since previous assessment? No   Discharge Plan discussed with: Patient;Adult children   Discharge Plan A Return to nursing home   Discharge Plan B Skilled Nursing Facility   DME Needed Upon Discharge  none   Discharge Barriers Identified None   Why the patient remains in the hospital Requires continued medical care   Post-Acute Status   Post-Acute Authorization Placement   Post-Acute Placement Status Referrals Sent   Hospital Resources/Appts/Education Provided Appointments scheduled by Navigator/Coordinator   Discharge Delays None known at this time     Yasmine Mccormick RN    [phone]

## 2022-10-14 NOTE — SUBJECTIVE & OBJECTIVE
Interval History:  No acute events overnight.  Weaned off oxygen to room air.  Continues to have a cough.  Urine cultures pending.  Worked with Physical therapy and feeling better overall.  No new complaints.    Review of Systems   Constitutional:  Positive for activity change, appetite change and fatigue.   Eyes:  Negative for visual disturbance.   Respiratory:  Positive for cough. Negative for shortness of breath.    Cardiovascular:  Negative for chest pain and leg swelling.   Gastrointestinal:  Negative for abdominal pain, diarrhea, nausea and vomiting.   Genitourinary:  Negative for dysuria, frequency and urgency.   Musculoskeletal:  Negative for back pain.   Neurological:  Negative for light-headedness and headaches.   Psychiatric/Behavioral:  Negative for confusion.    Objective:     Vital Signs (Most Recent):  Temp: 97.7 °F (36.5 °C) (10/14/22 1525)  Pulse: 80 (10/14/22 1620)  Resp: (!) 24 (10/14/22 1525)  BP: (!) 161/70 (10/14/22 1525)  SpO2: 98 % (10/14/22 1735) Vital Signs (24h Range):  Temp:  [97.3 °F (36.3 °C)-98.6 °F (37 °C)] 97.7 °F (36.5 °C)  Pulse:  [64-91] 80  Resp:  [18-26] 24  SpO2:  [93 %-99 %] 98 %  BP: (104-161)/(56-70) 161/70     Weight: 74.7 kg (164 lb 10.9 oz)  Body mass index is 34.42 kg/m².    Intake/Output Summary (Last 24 hours) at 10/14/2022 1850  Last data filed at 10/14/2022 1830  Gross per 24 hour   Intake 555.34 ml   Output 2375 ml   Net -1819.66 ml        Physical Exam  Vitals and nursing note reviewed.   Constitutional:       General: She is not in acute distress.     Appearance: She is obese.      Comments: Coughing during the encounter   HENT:      Head: Normocephalic and atraumatic.      Mouth/Throat:      Mouth: Mucous membranes are moist.   Eyes:      General: No scleral icterus.  Cardiovascular:      Rate and Rhythm: Normal rate and regular rhythm.      Pulses: Normal pulses.      Heart sounds: Normal heart sounds. No murmur heard.  Pulmonary:      Effort: Pulmonary effort  is normal. No respiratory distress.      Breath sounds: No wheezing or rales.   Abdominal:      Palpations: Abdomen is soft.      Tenderness: There is no abdominal tenderness.      Comments: Colostomy bag in place with brown stool   Musculoskeletal:      Cervical back: Neck supple.      Right lower leg: No edema.      Left lower leg: No edema.   Skin:     General: Skin is warm and dry.      Findings: No bruising or rash.   Neurological:      General: No focal deficit present.      Mental Status: She is alert and oriented to person, place, and time.   Psychiatric:         Mood and Affect: Mood normal.       Significant Labs: All pertinent labs within the past 24 hours have been reviewed.    Significant Imaging: I have reviewed all pertinent imaging results/findings within the past 24 hours.

## 2022-10-14 NOTE — PLAN OF CARE
Problem: Physical Therapy  Goal: Physical Therapy Goal  Description: Goals to be met by: 22     Patient will increase functional independence with mobility by performin.  Supine to sit with Min assist of 1  2.  F+/G- sitting balance  3.  Sit to stand with RW with Min assist of 1  4.  Bed to/from chair with RW with Min assist of 1.    5.  Ambulate 10' with RW with Min assist of 1    Outcome: Ongoing, Progressing

## 2022-10-14 NOTE — PROGRESS NOTES
RSW met with patient to discuss discharge and additional patient barriers to care. Pt identified no additional social barriers to care. Per pt, pt is not in need of resources at this time.    The following were addressed during this visit:  -Complete follow-up with patient    PEPITO David

## 2022-10-14 NOTE — PT/OT/SLP EVAL
Physical Therapy Evaluation    Patient Name:  Annette Garcia   MRN:  [id number]    Recommendations:     Discharge Recommendations:   (PT at NH)   Discharge Equipment Recommendations: none   Barriers to discharge: None    Assessment:     Annette Garcia is a 87 y.o. female admitted with a medical diagnosis of Acute hypoxemic respiratory failure.  She presents with the following impairments/functional limitations:  weakness, gait instability, impaired cardiopulmonary response to activity, impaired endurance, impaired balance, decreased lower extremity function, impaired self care skills, impaired functional mobility. Pt supine to sit with Max assist of 1.  Pt lost balance backward x 1 in sitting slightly.  Pt sit to stand x 3 with RW, ambulating 1' x 2 to the right and scooting to the right in sitting x 1.  Pt went sit to supine with max assist of 1.  Pt scooted to HOB with Mod assist of 2.    Rehab Prognosis: Fair; patient would benefit from acute skilled PT services to address these deficits and reach maximum level of function.    Recent Surgery: * No surgery found *      Plan:     During this hospitalization, patient to be seen  (2-3x/week) to address the identified rehab impairments via therapeutic activities, gait training, therapeutic exercises, neuromuscular re-education and progress toward the following goals:    Plan of Care Expires:  11/08/22    Subjective     Chief Complaint: I am not allowed to walk on my own.   Patient/Family Comments/goals: Move around some  Pain/Comfort:  Pain Rating 1:  (none reported)    Patients cultural, spiritual, Anglican conflicts given the current situation: no    Living Environment:  Pt resides at [facility].  Prior to admission, patients level of function was assist for transfers and gait, maintenance rehab program.  Equipment used at home: walker, rolling, wheelchair.  DME owned (not currently used): none.  Upon discharge, patient will have assistance from  staff.    Objective:     Communicated with nurse prior to session.  Patient found HOB elevated with peripheral IV, bed alarm, PureWick  upon PT entry to room.    General Precautions: Standard, fall   Orthopedic Precautions:    Braces:    Respiratory Status: Room air    Exams:  Pt is oriented to place, date and situation.  Pt lacks BLE ankle, knee and hip end ROM.  Pt has 3- to 4+ BLE strength.    Functional Mobility:  Pt supine to sit with Max assist of 1.  Pt lost balance backward x 1 in sitting slightly.  Pt sit to stand x 3 with RW, ambulating 1' x 2 to the right and scooting to the right in sitting x 1.  Pt went sit to supine with max assist of 1.  Pt scooted to HOB with Mod assist of 2.      AM-PAC 6 CLICK MOBILITY  Total Score:11     Patient left HOB elevated with all lines intact, call button in reach, bed alarm on, and nurse notified.    GOALS:   Multidisciplinary Problems       Physical Therapy Goals          Problem: Physical Therapy    Goal Priority Disciplines Outcome Goal Variances Interventions   Physical Therapy Goal     PT, PT/OT Ongoing, Progressing     Description: Goals to be met by: 22     Patient will increase functional independence with mobility by performin.  Supine to sit with Min assist of 1  2.  F+/G- sitting balance  3.  Sit to stand with RW with Min assist of 1  4.  Bed to/from chair with RW with Min assist of 1.    5.  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    In the setting of septic shock and near death    Petit mal epilepsy     Scoliosis of lumbar spine     Seizures     Unspecified hypothyroidism      UTI (urinary tract infection) 05/22/2022       Past Surgical History:   Procedure Laterality Date    APPENDECTOMY      BACK SURGERY  1988    vertebral fracture    BACK SURGERY  02/2013    lumbar L2-5    CATARACT EXTRACTION, BILATERAL Bilateral     CHOLECYSTECTOMY      open    EYE SURGERY Bilateral     cataract removal with lens implant    HYSTERECTOMY      PERCUTANEOUS TRANSLUMINAL ANGIOPLASTY (PTA) OF PERIPHERAL VESSEL N/A 2/3/2020    Procedure: PTA, PERIPHERAL VESSEL;  Surgeon: Timmy Simmons MD;  Location: [facility] CATH LAB/EP;  Service: Cardiology;  Laterality: N/A;    PORTACATH PLACEMENT Right 09/2016    RENAL ARTERY STENT Left 07/19/2017    SIGMOIDECTOMY  10/29/2019    SMALL INTESTINE SURGERY  08/23/2016    THROMBECTOMY N/A 2/3/2020    Procedure: THROMBECTOMY;  Surgeon: Timmy Simmons MD;  Location: [facility] CATH LAB/EP;  Service: Cardiology;  Laterality: N/A;    TONSILLECTOMY      VAGINAL HYSTERECTOMY W/ ANTERIOR AND POSTERIOR VAGINAL REPAIR         Time Tracking:     PT Received On: 10/14/22  PT Start Time: 1237     PT Stop Time: 1300  PT Total Time (min): 23 min     Billable Minutes: Evaluation 13 and Therapeutic Activity 10      10/14/2022

## 2022-10-14 NOTE — ASSESSMENT & PLAN NOTE
Urine cultures growing Enterococcus faecalis sensitive to vancomycin and another Gram-negative bacilli pending susceptibility    - cefepime day 4 for gram negative bacilli pending culture  - vancomycin IV day 2 as she has anaphylactic allergy to penicillin to cover Enterococcus.      Will treat for total 3 days for uncomplicated UTI.  If she were to be discharged prior to this, discussed with Pharmacy nitrofurantoin would be a PO alternative

## 2022-10-15 LAB
BACTERIA UR CULT: ABNORMAL
BACTERIA UR CULT: ABNORMAL
VANCOMYCIN TROUGH SERPL-MCNC: 13.7 UG/ML (ref 10–22)

## 2022-10-15 PROCEDURE — 63600175 PHARM REV CODE 636 W HCPCS: Performed by: STUDENT IN AN ORGANIZED HEALTH CARE EDUCATION/TRAINING PROGRAM

## 2022-10-15 PROCEDURE — 94640 AIRWAY INHALATION TREATMENT: CPT

## 2022-10-15 PROCEDURE — 25000003 PHARM REV CODE 250: Performed by: INTERNAL MEDICINE

## 2022-10-15 PROCEDURE — 25000242 PHARM REV CODE 250 ALT 637 W/ HCPCS: Performed by: STUDENT IN AN ORGANIZED HEALTH CARE EDUCATION/TRAINING PROGRAM

## 2022-10-15 PROCEDURE — 25000003 PHARM REV CODE 250: Performed by: STUDENT IN AN ORGANIZED HEALTH CARE EDUCATION/TRAINING PROGRAM

## 2022-10-15 PROCEDURE — 21400001 HC TELEMETRY ROOM

## 2022-10-15 PROCEDURE — 94799 UNLISTED PULMONARY SVC/PX: CPT

## 2022-10-15 PROCEDURE — 27000221 HC OXYGEN, UP TO 24 HOURS

## 2022-10-15 PROCEDURE — 94761 N-INVAS EAR/PLS OXIMETRY MLT: CPT

## 2022-10-15 PROCEDURE — 36415 COLL VENOUS BLD VENIPUNCTURE: CPT | Performed by: STUDENT IN AN ORGANIZED HEALTH CARE EDUCATION/TRAINING PROGRAM

## 2022-10-15 PROCEDURE — 99900035 HC TECH TIME PER 15 MIN (STAT)

## 2022-10-15 PROCEDURE — 80202 ASSAY OF VANCOMYCIN: CPT | Performed by: STUDENT IN AN ORGANIZED HEALTH CARE EDUCATION/TRAINING PROGRAM

## 2022-10-15 PROCEDURE — 63600175 PHARM REV CODE 636 W HCPCS: Performed by: EMERGENCY MEDICINE

## 2022-10-15 RX ORDER — IPRATROPIUM BROMIDE AND ALBUTEROL SULFATE 2.5; .5 MG/3ML; MG/3ML
3 SOLUTION RESPIRATORY (INHALATION) EVERY 6 HOURS
Status: COMPLETED | OUTPATIENT
Start: 2022-10-15 | End: 2022-10-16

## 2022-10-15 RX ADMIN — OXCARBAZEPINE 150 MG: 150 TABLET, FILM COATED ORAL at 09:10

## 2022-10-15 RX ADMIN — VANCOMYCIN HYDROCHLORIDE 1500 MG: 1.5 INJECTION, POWDER, LYOPHILIZED, FOR SOLUTION INTRAVENOUS at 04:10

## 2022-10-15 RX ADMIN — PRAVASTATIN SODIUM 20 MG: 20 TABLET ORAL at 11:10

## 2022-10-15 RX ADMIN — BENZONATATE 200 MG: 100 CAPSULE ORAL at 12:10

## 2022-10-15 RX ADMIN — IPRATROPIUM BROMIDE AND ALBUTEROL SULFATE 3 ML: 2.5; .5 SOLUTION RESPIRATORY (INHALATION) at 07:10

## 2022-10-15 RX ADMIN — METOPROLOL TARTRATE 25 MG: 25 TABLET, FILM COATED ORAL at 11:10

## 2022-10-15 RX ADMIN — CHOLECALCIFEROL TAB 25 MCG (1000 UNIT) 1000 UNITS: 25 TAB at 11:10

## 2022-10-15 RX ADMIN — MICONAZOLE NITRATE 2 % TOPICAL POWDER: at 11:10

## 2022-10-15 RX ADMIN — CEFEPIME 2 G: 2 INJECTION, POWDER, FOR SOLUTION INTRAVENOUS at 03:10

## 2022-10-15 RX ADMIN — METOPROLOL TARTRATE 25 MG: 25 TABLET, FILM COATED ORAL at 09:10

## 2022-10-15 RX ADMIN — LEVOTHYROXINE SODIUM 125 MCG: 0.12 TABLET ORAL at 05:10

## 2022-10-15 RX ADMIN — PHENOBARBITAL 97.2 MG: 32.4 TABLET ORAL at 09:10

## 2022-10-15 RX ADMIN — IPRATROPIUM BROMIDE AND ALBUTEROL SULFATE 3 ML: 2.5; .5 SOLUTION RESPIRATORY (INHALATION) at 11:10

## 2022-10-15 RX ADMIN — APIXABAN 2.5 MG: 2.5 TABLET, FILM COATED ORAL at 11:10

## 2022-10-15 RX ADMIN — FUROSEMIDE 20 MG: 20 TABLET ORAL at 11:10

## 2022-10-15 RX ADMIN — POTASSIUM CHLORIDE 20 MEQ: 1500 TABLET, EXTENDED RELEASE ORAL at 11:10

## 2022-10-15 RX ADMIN — APIXABAN 2.5 MG: 2.5 TABLET, FILM COATED ORAL at 09:10

## 2022-10-15 RX ADMIN — MICONAZOLE NITRATE 2 % TOPICAL POWDER: at 09:10

## 2022-10-15 NOTE — PLAN OF CARE
Patient lying in bed AAOX2 to 3 with some confusion. Plan of care has been reviewed. Patient verbalizes some understanding of medical care and medication. Patient remains on telemetry normal sinus rhythm. Turn Q2.  No acute distress noted at this time. Vital signs stable. Bed in low position with bed alarm on call light in reach. Will continue to monitor.

## 2022-10-15 NOTE — ASSESSMENT & PLAN NOTE
Urine cultures growing Enterococcus faecalis sensitive to vancomycin and Pseudomonas    - cefepime day 4 for Pseudomonas aeruginosa (sensitive to cefepime, resistant to ciprofloxacin).  Also treating pneumonia.  - vancomycin IV day 3 as she has anaphylactic allergy to penicillin to cover Enterococcus.

## 2022-10-15 NOTE — ASSESSMENT & PLAN NOTE
Was on 3 L NC on admission.  Weaned to room air.  Likely in setting of pneumonia.   Treating as hospital-acquired pneumonia given recent hospitalization for parastomal hernia repair.  Influenza A/B negative     Cefepime Day 4  Vancomycin day 2    Will treat for 5-7 days antibiotics pending response  Scheduled inhalers given wheezing on exam today  Incentive spirometer

## 2022-10-15 NOTE — ASSESSMENT & PLAN NOTE
Was on 3 L on admission.  Weaned to room air.  Likely in setting of pneumonia.  Day 4 of broad-spectrum antibiotics.  Treating as hospital-acquired pneumonia given recent hospitalization for parastomal hernia repair.

## 2022-10-15 NOTE — SUBJECTIVE & OBJECTIVE
Interval History:  No acute events overnight.  Placed on oxygen 1.5 L via NC overnight.  Removed oxygen while I was in the room.  Oxygen saturation maintained at 93-94% on room air with a good waveform.  Overall feels better but does have a persistent cough.  No new complaints.    Review of Systems   Constitutional:  Positive for activity change, appetite change and fatigue.   Eyes:  Negative for visual disturbance.   Respiratory:  Positive for cough. Negative for shortness of breath.    Cardiovascular:  Negative for chest pain and leg swelling.   Gastrointestinal:  Negative for abdominal pain, diarrhea, nausea and vomiting.   Genitourinary:  Negative for dysuria, frequency and urgency.   Musculoskeletal:  Negative for back pain.   Neurological:  Negative for light-headedness and headaches.   Psychiatric/Behavioral:  Negative for confusion.    Objective:     Vital Signs (Most Recent):  Temp: 97.9 °F (36.6 °C) (10/15/22 0813)  Pulse: 78 (10/15/22 1158)  Resp: 18 (10/15/22 1129)  BP: (!) 125/55 (10/15/22 0813)  SpO2: 95 % (10/15/22 1129) Vital Signs (24h Range):  Temp:  [97.5 °F (36.4 °C)-98.9 °F (37.2 °C)] 97.9 °F (36.6 °C)  Pulse:  [70-85] 78  Resp:  [16-24] 18  SpO2:  [94 %-99 %] 95 %  BP: (125-161)/(55-70) 125/55     Weight: 74.7 kg (164 lb 10.9 oz)  Body mass index is 34.42 kg/m².    Intake/Output Summary (Last 24 hours) at 10/15/2022 1520  Last data filed at 10/15/2022 0905  Gross per 24 hour   Intake 563.2 ml   Output 1300 ml   Net -736.8 ml        Physical Exam  Vitals and nursing note reviewed.   Constitutional:       General: She is not in acute distress.     Appearance: She is obese.      Comments: Coughing during the encounter   HENT:      Head: Normocephalic and atraumatic.      Mouth/Throat:      Mouth: Mucous membranes are moist.   Eyes:      General: No scleral icterus.  Cardiovascular:      Rate and Rhythm: Normal rate and regular rhythm.      Pulses: Normal pulses.      Heart sounds: Normal heart  sounds. No murmur heard.  Pulmonary:      Effort: Pulmonary effort is normal. No respiratory distress.      Breath sounds: Wheezing and rales present.   Abdominal:      Palpations: Abdomen is soft.      Tenderness: There is no abdominal tenderness.      Comments: Colostomy bag in place with brown stool   Musculoskeletal:      Cervical back: Neck supple.      Right lower leg: No edema.      Left lower leg: No edema.   Skin:     General: Skin is warm and dry.      Findings: No bruising or rash.   Neurological:      General: No focal deficit present.      Mental Status: She is alert and oriented to person, place, and time.   Psychiatric:         Mood and Affect: Mood normal.       Significant Labs: All pertinent labs within the past 24 hours have been reviewed.    Significant Imaging: I have reviewed all pertinent imaging results/findings within the past 24 hours.

## 2022-10-15 NOTE — PROGRESS NOTES
[facility] Medicine  Progress Note    Patient Name: Annette Garcia  MRN: [id number]  Patient Class: IP- Inpatient   Admission Date: 10/11/2022  Length of Stay: 4 days  Attending Physician: Clarice Holloway MD  Primary Care Provider: Jose Valles MD        Subjective:     Principal Problem:Acute hypoxemic respiratory failure      HPI:  This is an 86-year-old female with a history of CAD, hypertension, atrial fibrillation on Eliquis, seizure disorder, hypothyroidism, colostomy presents to the emergency department from an outside nursing facility for cough, hypoxemia, altered mental status.  She has been having a cough for approximately 2 days now.  The cough is productive.  She does feel short of breath.  She denies any recent sick contacts.  There has not been any issues with fever, chest pain, chills, abdominal pain, nausea or vomiting.  A chest x-ray at a nursing facility did show possible pneumonia and she was started on doxycycline.  She has no history of being on home oxygen supplementation and she was hypoxic in the emergency department in the high 80s.  VBG did not show any CO2 retention.  She is also noted to have an episode of AFib with RVR.  Oral dose of metoprolol was given.      Overview/Hospital Course:  No notes on file    Interval History:  No acute events overnight.  Placed on oxygen 1.5 L via NC overnight.  Removed oxygen while I was in the room.  Oxygen saturation maintained at 93-94% on room air with a good waveform.  Overall feels better but does have a persistent cough.  No new complaints.    Review of Systems   Constitutional:  Positive for activity change, appetite change and fatigue.   Eyes:  Negative for visual disturbance.   Respiratory:  Positive for cough. Negative for shortness of breath.    Cardiovascular:  Negative for chest pain and leg swelling.   Gastrointestinal:  Negative for abdominal pain, diarrhea, nausea and vomiting.   Genitourinary:  Negative for  dysuria, frequency and urgency.   Musculoskeletal:  Negative for back pain.   Neurological:  Negative for light-headedness and headaches.   Psychiatric/Behavioral:  Negative for confusion.    Objective:     Vital Signs (Most Recent):  Temp: 97.9 °F (36.6 °C) (10/15/22 0813)  Pulse: 78 (10/15/22 1158)  Resp: 18 (10/15/22 1129)  BP: (!) 125/55 (10/15/22 0813)  SpO2: 95 % (10/15/22 1129) Vital Signs (24h Range):  Temp:  [97.5 °F (36.4 °C)-98.9 °F (37.2 °C)] 97.9 °F (36.6 °C)  Pulse:  [70-85] 78  Resp:  [16-24] 18  SpO2:  [94 %-99 %] 95 %  BP: (125-161)/(55-70) 125/55     Weight: 74.7 kg (164 lb 10.9 oz)  Body mass index is 34.42 kg/m².    Intake/Output Summary (Last 24 hours) at 10/15/2022 1520  Last data filed at 10/15/2022 0905  Gross per 24 hour   Intake 563.2 ml   Output 1300 ml   Net -736.8 ml        Physical Exam  Vitals and nursing note reviewed.   Constitutional:       General: She is not in acute distress.     Appearance: She is obese.      Comments: Coughing during the encounter   HENT:      Head: Normocephalic and atraumatic.      Mouth/Throat:      Mouth: Mucous membranes are moist.   Eyes:      General: No scleral icterus.  Cardiovascular:      Rate and Rhythm: Normal rate and regular rhythm.      Pulses: Normal pulses.      Heart sounds: Normal heart sounds. No murmur heard.  Pulmonary:      Effort: Pulmonary effort is normal. No respiratory distress.      Breath sounds: Wheezing and rales present.   Abdominal:      Palpations: Abdomen is soft.      Tenderness: There is no abdominal tenderness.      Comments: Colostomy bag in place with brown stool   Musculoskeletal:      Cervical back: Neck supple.      Right lower leg: No edema.      Left lower leg: No edema.   Skin:     General: Skin is warm and dry.      Findings: No bruising or rash.   Neurological:      General: No focal deficit present.      Mental Status: She is alert and oriented to person, place, and time.   Psychiatric:         Mood and Affect:  Mood normal.       Significant Labs: All pertinent labs within the past 24 hours have been reviewed.    Significant Imaging: I have reviewed all pertinent imaging results/findings within the past 24 hours.      Assessment/Plan:      * Acute hypoxemic respiratory failure  Was on 3 L NC on admission.  Weaned to room air.  Likely in setting of pneumonia.   Treating as hospital-acquired pneumonia given recent hospitalization for parastomal hernia repair.  Influenza A/B negative     Cefepime Day 4  Vancomycin day 2    Will treat for 5-7 days antibiotics pending response  Scheduled inhalers given wheezing on exam today  Incentive spirometer    Pneumonia due to infectious organism  Chest x-ray showing findings suggestive of developing left lower consolidation.    Treatment as above          Urinary tract infection without hematuria  Urine cultures growing Enterococcus faecalis sensitive to vancomycin and Pseudomonas    - cefepime day 4 for Pseudomonas aeruginosa (sensitive to cefepime, resistant to ciprofloxacin).  Also treating pneumonia.  - vancomycin IV day 3 as she has anaphylactic allergy to penicillin to cover Enterococcus.            Colostomy present  Recent history of high-grade SBO s/p primary parastomal hernia repair and ostomy on 09/2022      Atrial fibrillation  Patient with Paroxysmal (<7 days) atrial fibrillation which is controlled currently with Beta Blocker. Patient is currently in sinus rhythm.[id number] Score: 4. HASBLED Score: . Anticoagulation indicated. Anticoagulation done with eliquis.        Hypothyroidism  Continue Synthroid      Seizure disorder  Stable.  Continue home medications      VTE Risk Mitigation (From admission, onward)         Ordered     apixaban tablet 2.5 mg  2 times daily         10/11/22 2106                Discharge Planning   JESSICA:      Code Status: DNR   Is the patient medically ready for discharge?:     Reason for patient still in hospital (select all that apply): Patient  trending condition and Pending disposition  Discharge Plan A: Return to nursing home   Discharge Delays: None known at this time    PT consult    Clarice Holloway MD  Department of Hospital Medicine   [facility]

## 2022-10-15 NOTE — PROGRESS NOTES
[facility] Medicine  Progress Note    Patient Name: Annette Garcia  MRN: [id number]  Patient Class: IP- Inpatient   Admission Date: 10/11/2022  Length of Stay: 3 days  Attending Physician: Clarice Holloway MD  Primary Care Provider: Jose Valles MD        Subjective:     Principal Problem:Acute hypoxemic respiratory failure        HPI:  This is an 86-year-old female with a history of CAD, hypertension, atrial fibrillation on Eliquis, seizure disorder, hypothyroidism, colostomy presents to the emergency department from an outside nursing facility for cough, hypoxemia, altered mental status.  She has been having a cough for approximately 2 days now.  The cough is productive.  She does feel short of breath.  She denies any recent sick contacts.  There has not been any issues with fever, chest pain, chills, abdominal pain, nausea or vomiting.  A chest x-ray at a nursing facility did show possible pneumonia and she was started on doxycycline.  She has no history of being on home oxygen supplementation and she was hypoxic in the emergency department in the high 80s.  VBG did not show any CO2 retention.  She is also noted to have an episode of AFib with RVR.  Oral dose of metoprolol was given.      Overview/Hospital Course:  No notes on file    Interval History:  No acute events overnight.  Weaned off oxygen to room air.  Continues to have a cough.  Urine cultures pending.  Worked with Physical therapy and feeling better overall.  No new complaints.    Review of Systems   Constitutional:  Positive for activity change, appetite change and fatigue.   Eyes:  Negative for visual disturbance.   Respiratory:  Positive for cough. Negative for shortness of breath.    Cardiovascular:  Negative for chest pain and leg swelling.   Gastrointestinal:  Negative for abdominal pain, diarrhea, nausea and vomiting.   Genitourinary:  Negative for dysuria, frequency and urgency.   Musculoskeletal:  Negative for back  pain.   Neurological:  Negative for light-headedness and headaches.   Psychiatric/Behavioral:  Negative for confusion.    Objective:     Vital Signs (Most Recent):  Temp: 97.7 °F (36.5 °C) (10/14/22 1525)  Pulse: 80 (10/14/22 1620)  Resp: (!) 24 (10/14/22 1525)  BP: (!) 161/70 (10/14/22 1525)  SpO2: 98 % (10/14/22 1735) Vital Signs (24h Range):  Temp:  [97.3 °F (36.3 °C)-98.6 °F (37 °C)] 97.7 °F (36.5 °C)  Pulse:  [64-91] 80  Resp:  [18-26] 24  SpO2:  [93 %-99 %] 98 %  BP: (104-161)/(56-70) 161/70     Weight: 74.7 kg (164 lb 10.9 oz)  Body mass index is 34.42 kg/m².    Intake/Output Summary (Last 24 hours) at 10/14/2022 1850  Last data filed at 10/14/2022 1830  Gross per 24 hour   Intake 555.34 ml   Output 2375 ml   Net -1819.66 ml        Physical Exam  Vitals and nursing note reviewed.   Constitutional:       General: She is not in acute distress.     Appearance: She is obese.      Comments: Coughing during the encounter   HENT:      Head: Normocephalic and atraumatic.      Mouth/Throat:      Mouth: Mucous membranes are moist.   Eyes:      General: No scleral icterus.  Cardiovascular:      Rate and Rhythm: Normal rate and regular rhythm.      Pulses: Normal pulses.      Heart sounds: Normal heart sounds. No murmur heard.  Pulmonary:      Effort: Pulmonary effort is normal. No respiratory distress.      Breath sounds: No wheezing or rales.   Abdominal:      Palpations: Abdomen is soft.      Tenderness: There is no abdominal tenderness.      Comments: Colostomy bag in place with brown stool   Musculoskeletal:      Cervical back: Neck supple.      Right lower leg: No edema.      Left lower leg: No edema.   Skin:     General: Skin is warm and dry.      Findings: No bruising or rash.   Neurological:      General: No focal deficit present.      Mental Status: She is alert and oriented to person, place, and time.   Psychiatric:         Mood and Affect: Mood normal.       Significant Labs: All pertinent labs within the  past 24 hours have been reviewed.    Significant Imaging: I have reviewed all pertinent imaging results/findings within the past 24 hours.      Assessment/Plan:      * Acute hypoxemic respiratory failure  Was on 3 L on admission.  Weaned to room air.  Likely in setting of pneumonia.  Day 4 of broad-spectrum antibiotics.  Treating as hospital-acquired pneumonia given recent hospitalization for parastomal hernia repair.    Pneumonia due to infectious organism  Chest x-ray showing findings suggestive of developing left lower consolidation.    Treatment as above          Urinary tract infection without hematuria  Urine cultures growing Enterococcus faecalis sensitive to vancomycin and another Gram-negative bacilli pending susceptibility    - cefepime day 4 for gram negative bacilli pending culture  - vancomycin IV day 2 as she has anaphylactic allergy to penicillin to cover Enterococcus.      Will treat for total 3 days for uncomplicated UTI.  If she were to be discharged prior to this, discussed with Pharmacy nitrofurantoin would be a PO alternative      Colostomy present  Recent history of high-grade SBO s/p primary parastomal hernia repair and ostomy on 09/2022      Atrial fibrillation  Patient with Paroxysmal (<7 days) atrial fibrillation which is controlled currently with Beta Blocker. Patient is currently in sinus rhythm.[id number] Score: 4. HASBLED Score: . Anticoagulation indicated. Anticoagulation done with eliquis.        Hypothyroidism  Continue Synthroid      Seizure disorder  Stable.  Continue home medications        VTE Risk Mitigation (From admission, onward)         Ordered     apixaban tablet 2.5 mg  2 times daily         10/11/22 2106                Discharge Planning   JESSICA:      Code Status: DNR   Is the patient medically ready for discharge?:     Reason for patient still in hospital (select all that apply): Laboratory test  Discharge Plan A: Return to nursing home   Discharge Delays: None known at  this time    Waiting on final urine cultures.  Once urine cultures results and antibiotics final eyes, medically stable for discharge to nursing home.    Clarice Holloway MD  Department of Hospital Medicine   [facility]

## 2022-10-16 LAB — BACTERIA BLD CULT: NORMAL

## 2022-10-16 PROCEDURE — 25000003 PHARM REV CODE 250: Performed by: STUDENT IN AN ORGANIZED HEALTH CARE EDUCATION/TRAINING PROGRAM

## 2022-10-16 PROCEDURE — 21400001 HC TELEMETRY ROOM

## 2022-10-16 PROCEDURE — 63600175 PHARM REV CODE 636 W HCPCS: Performed by: STUDENT IN AN ORGANIZED HEALTH CARE EDUCATION/TRAINING PROGRAM

## 2022-10-16 PROCEDURE — 94799 UNLISTED PULMONARY SVC/PX: CPT

## 2022-10-16 PROCEDURE — 63600175 PHARM REV CODE 636 W HCPCS: Performed by: EMERGENCY MEDICINE

## 2022-10-16 PROCEDURE — 25000003 PHARM REV CODE 250: Performed by: NURSE PRACTITIONER

## 2022-10-16 PROCEDURE — 99900035 HC TECH TIME PER 15 MIN (STAT)

## 2022-10-16 PROCEDURE — 94761 N-INVAS EAR/PLS OXIMETRY MLT: CPT

## 2022-10-16 PROCEDURE — 25000003 PHARM REV CODE 250: Performed by: INTERNAL MEDICINE

## 2022-10-16 PROCEDURE — 94640 AIRWAY INHALATION TREATMENT: CPT

## 2022-10-16 PROCEDURE — 25000242 PHARM REV CODE 250 ALT 637 W/ HCPCS: Performed by: STUDENT IN AN ORGANIZED HEALTH CARE EDUCATION/TRAINING PROGRAM

## 2022-10-16 RX ORDER — ACETAMINOPHEN 325 MG/1
650 TABLET ORAL EVERY 6 HOURS PRN
Status: DISCONTINUED | OUTPATIENT
Start: 2022-10-16 | End: 2022-10-17 | Stop reason: HOSPADM

## 2022-10-16 RX ADMIN — VANCOMYCIN HYDROCHLORIDE 1500 MG: 1.5 INJECTION, POWDER, LYOPHILIZED, FOR SOLUTION INTRAVENOUS at 04:10

## 2022-10-16 RX ADMIN — LEVOTHYROXINE SODIUM 125 MCG: 0.12 TABLET ORAL at 05:10

## 2022-10-16 RX ADMIN — IPRATROPIUM BROMIDE AND ALBUTEROL SULFATE 3 ML: 2.5; .5 SOLUTION RESPIRATORY (INHALATION) at 07:10

## 2022-10-16 RX ADMIN — APIXABAN 2.5 MG: 2.5 TABLET, FILM COATED ORAL at 10:10

## 2022-10-16 RX ADMIN — METOPROLOL TARTRATE 25 MG: 25 TABLET, FILM COATED ORAL at 08:10

## 2022-10-16 RX ADMIN — POTASSIUM CHLORIDE 20 MEQ: 1500 TABLET, EXTENDED RELEASE ORAL at 10:10

## 2022-10-16 RX ADMIN — IPRATROPIUM BROMIDE AND ALBUTEROL SULFATE 3 ML: 2.5; .5 SOLUTION RESPIRATORY (INHALATION) at 01:10

## 2022-10-16 RX ADMIN — MICONAZOLE NITRATE 2 % TOPICAL POWDER: at 08:10

## 2022-10-16 RX ADMIN — ACETAMINOPHEN 650 MG: 325 TABLET ORAL at 02:10

## 2022-10-16 RX ADMIN — CHOLECALCIFEROL TAB 25 MCG (1000 UNIT) 1000 UNITS: 25 TAB at 10:10

## 2022-10-16 RX ADMIN — OXCARBAZEPINE 150 MG: 150 TABLET, FILM COATED ORAL at 08:10

## 2022-10-16 RX ADMIN — PHENOBARBITAL 97.2 MG: 32.4 TABLET ORAL at 08:10

## 2022-10-16 RX ADMIN — CEFEPIME 2 G: 2 INJECTION, POWDER, FOR SOLUTION INTRAVENOUS at 02:10

## 2022-10-16 RX ADMIN — PRAVASTATIN SODIUM 20 MG: 20 TABLET ORAL at 10:10

## 2022-10-16 RX ADMIN — CEFEPIME 2 G: 2 INJECTION, POWDER, FOR SOLUTION INTRAVENOUS at 03:10

## 2022-10-16 RX ADMIN — FUROSEMIDE 20 MG: 20 TABLET ORAL at 10:10

## 2022-10-16 RX ADMIN — IPRATROPIUM BROMIDE AND ALBUTEROL SULFATE 3 ML: 2.5; .5 SOLUTION RESPIRATORY (INHALATION) at 12:10

## 2022-10-16 RX ADMIN — METOPROLOL TARTRATE 25 MG: 25 TABLET, FILM COATED ORAL at 10:10

## 2022-10-16 RX ADMIN — APIXABAN 2.5 MG: 2.5 TABLET, FILM COATED ORAL at 08:10

## 2022-10-16 NOTE — PROGRESS NOTES
[facility]  Progress Note    Patient Name: Annette Garcia  MRN: [id number]  Patient Class: IP- Inpatient   Admission Date: 10/11/2022  Length of Stay: 5 days  Attending Physician: Clarice Holloway MD  Primary Care Provider: Jose Valles MD        Subjective:     Principal Problem:Acute hypoxemic respiratory failure        HPI:  This is an 86-year-old female with a history of CAD, hypertension, atrial fibrillation on Eliquis, seizure disorder, hypothyroidism, colostomy presents to the emergency department from an outside nursing facility for cough, hypoxemia, altered mental status.  She has been having a cough for approximately 2 days now.  The cough is productive.  She does feel short of breath.  She denies any recent sick contacts.  There has not been any issues with fever, chest pain, chills, abdominal pain, nausea or vomiting.  A chest x-ray at a nursing facility did show possible pneumonia and she was started on doxycycline.  She has no history of being on home oxygen supplementation and she was hypoxic in the emergency department in the high 80s.  VBG did not show any CO2 retention.  She is also noted to have an episode of AFib with RVR.  Oral dose of metoprolol was given.      Overview/Hospital Course:  No notes on file    Interval History:  No acute events overnight.  Weaned to RA. Feels better today. Denies SOB, has a cough. Feels okay to be discharged back to SNF tomorrow.     Review of Systems   Constitutional:  Positive for activity change and fatigue. Negative for appetite change.   Eyes:  Negative for visual disturbance.   Respiratory:  Positive for cough. Negative for shortness of breath.    Cardiovascular:  Negative for chest pain and leg swelling.   Gastrointestinal:  Negative for abdominal pain, diarrhea, nausea and vomiting.   Genitourinary:  Negative for dysuria, frequency and urgency.   Musculoskeletal:  Negative for back pain.   Neurological:  Negative for  light-headedness and headaches.   Psychiatric/Behavioral:  Negative for confusion.    Objective:     Vital Signs (Most Recent):  Temp: 98.4 °F (36.9 °C) (10/16/22 1149)  Pulse: 76 (10/16/22 1329)  Resp: 17 (10/16/22 1329)  BP: (!) 161/71 (10/16/22 1149)  SpO2: (!) 94 % (10/16/22 1329) Vital Signs (24h Range):  Temp:  [98 °F (36.7 °C)-98.4 °F (36.9 °C)] 98.4 °F (36.9 °C)  Pulse:  [69-88] 76  Resp:  [16-18] 17  SpO2:  [90 %-94 %] 94 %  BP: (106-161)/(57-71) 161/71     Weight: 74.7 kg (164 lb 10.9 oz)  Body mass index is 34.42 kg/m².    Intake/Output Summary (Last 24 hours) at 10/16/2022 1420  Last data filed at 10/16/2022 1016  Gross per 24 hour   Intake 700 ml   Output 1100 ml   Net -400 ml        Physical Exam  Vitals and nursing note reviewed.   Constitutional:       General: She is not in acute distress.     Appearance: She is obese.      Comments: Coughing during the encounter   HENT:      Head: Normocephalic and atraumatic.      Mouth/Throat:      Mouth: Mucous membranes are moist.   Eyes:      General: No scleral icterus.  Cardiovascular:      Rate and Rhythm: Normal rate and regular rhythm.      Pulses: Normal pulses.      Heart sounds: Normal heart sounds. No murmur heard.  Pulmonary:      Effort: Pulmonary effort is normal. No respiratory distress.      Breath sounds: No wheezing or rales.      Comments: Coughing while taking a deep breath  Abdominal:      Palpations: Abdomen is soft.      Tenderness: There is no abdominal tenderness.      Comments: Colostomy bag in place with brown stool   Musculoskeletal:      Cervical back: Neck supple.      Right lower leg: No edema.      Left lower leg: No edema.   Skin:     General: Skin is warm and dry.      Findings: No bruising or rash.   Neurological:      General: No focal deficit present.      Mental Status: She is alert and oriented to person, place, and time.   Psychiatric:         Mood and Affect: Mood normal.       Significant Labs: All pertinent labs within  the past 24 hours have been reviewed.    Significant Imaging: I have reviewed all pertinent imaging results/findings within the past 24 hours.      Assessment/Plan:      * Acute hypoxemic respiratory failure  Was on 3 L NC on admission.  Weaned to room air.  Likely in setting of pneumonia.   Treating as hospital-acquired pneumonia given recent hospitalization for parastomal hernia repair.  Influenza A/B negative     Cefepime Day 5  Vancomycin day 3    Will treat for 5-7 days antibiotics for pneumonia pending response  Scheduled inhalers given wheezing   Incentive spirometer    Pneumonia due to infectious organism  Chest x-ray showing findings suggestive of developing left lower consolidation.    Treatment as above          Urinary tract infection without hematuria  Urine cultures growing Enterococcus faecalis sensitive to vancomycin and Pseudomonas    - cefepime day 5 for Pseudomonas aeruginosa (sensitive to cefepime, resistant to ciprofloxacin).  Also treating pneumonia.  - vancomycin IV day 3 as she has anaphylactic allergy to penicillin to cover Enterococcus.              Colostomy present  Recent history of high-grade SBO s/p primary parastomal hernia repair and ostomy on 09/2022      Atrial fibrillation  Patient with Paroxysmal (<7 days) atrial fibrillation which is controlled currently with Beta Blocker. Patient is currently in sinus rhythm.[id number] Score: 4. HASBLED Score: . Anticoagulation indicated. Anticoagulation done with eliquis.        Hypothyroidism  Continue Synthroid      Seizure disorder  Stable.  Continue home medications        VTE Risk Mitigation (From admission, onward)         Ordered     apixaban tablet 2.5 mg  2 times daily         10/11/22 2106                Discharge Planning   JESSICA:      Code Status: DNR   Is the patient medically ready for discharge?:     Reason for patient still in hospital (select all that apply): Pending disposition  Discharge Plan A: Return to nursing home    Discharge Delays: None known at this time    Tentative plan for discharge to skilled nursing facility tomorrow if remains medically stable.    Clarice Holloway MD  Department of Hospital Medicine   [facility]

## 2022-10-16 NOTE — SUBJECTIVE & OBJECTIVE
Interval History:  No acute events overnight.  Weaned to RA. Feels better today. Denies SOB, has a cough. Feels okay to be discharged back to SNF tomorrow.     Review of Systems   Constitutional:  Positive for activity change and fatigue. Negative for appetite change.   Eyes:  Negative for visual disturbance.   Respiratory:  Positive for cough. Negative for shortness of breath.    Cardiovascular:  Negative for chest pain and leg swelling.   Gastrointestinal:  Negative for abdominal pain, diarrhea, nausea and vomiting.   Genitourinary:  Negative for dysuria, frequency and urgency.   Musculoskeletal:  Negative for back pain.   Neurological:  Negative for light-headedness and headaches.   Psychiatric/Behavioral:  Negative for confusion.    Objective:     Vital Signs (Most Recent):  Temp: 98.4 °F (36.9 °C) (10/16/22 1149)  Pulse: 76 (10/16/22 1329)  Resp: 17 (10/16/22 1329)  BP: (!) 161/71 (10/16/22 1149)  SpO2: (!) 94 % (10/16/22 1329) Vital Signs (24h Range):  Temp:  [98 °F (36.7 °C)-98.4 °F (36.9 °C)] 98.4 °F (36.9 °C)  Pulse:  [69-88] 76  Resp:  [16-18] 17  SpO2:  [90 %-94 %] 94 %  BP: (106-161)/(57-71) 161/71     Weight: 74.7 kg (164 lb 10.9 oz)  Body mass index is 34.42 kg/m².    Intake/Output Summary (Last 24 hours) at 10/16/2022 1420  Last data filed at 10/16/2022 1016  Gross per 24 hour   Intake 700 ml   Output 1100 ml   Net -400 ml        Physical Exam  Vitals and nursing note reviewed.   Constitutional:       General: She is not in acute distress.     Appearance: She is obese.      Comments: Coughing during the encounter   HENT:      Head: Normocephalic and atraumatic.      Mouth/Throat:      Mouth: Mucous membranes are moist.   Eyes:      General: No scleral icterus.  Cardiovascular:      Rate and Rhythm: Normal rate and regular rhythm.      Pulses: Normal pulses.      Heart sounds: Normal heart sounds. No murmur heard.  Pulmonary:      Effort: Pulmonary effort is normal. No respiratory distress.      Breath  sounds: No wheezing or rales.      Comments: Coughing while taking a deep breath  Abdominal:      Palpations: Abdomen is soft.      Tenderness: There is no abdominal tenderness.      Comments: Colostomy bag in place with brown stool   Musculoskeletal:      Cervical back: Neck supple.      Right lower leg: No edema.      Left lower leg: No edema.   Skin:     General: Skin is warm and dry.      Findings: No bruising or rash.   Neurological:      General: No focal deficit present.      Mental Status: She is alert and oriented to person, place, and time.   Psychiatric:         Mood and Affect: Mood normal.       Significant Labs: All pertinent labs within the past 24 hours have been reviewed.    Significant Imaging: I have reviewed all pertinent imaging results/findings within the past 24 hours.

## 2022-10-16 NOTE — ASSESSMENT & PLAN NOTE
Was on 3 L NC on admission.  Weaned to room air.  Likely in setting of pneumonia.   Treating as hospital-acquired pneumonia given recent hospitalization for parastomal hernia repair.  Influenza A/B negative     Cefepime Day 5  Vancomycin day 3    Will treat for 5-7 days antibiotics for pneumonia pending response  Scheduled inhalers given wheezing   Incentive spirometer

## 2022-10-16 NOTE — PLAN OF CARE
Pulse oximetry on room air is 92%. Pt on RA, no respiratory distress noted. Will continue to monitor. The proper method of use, as well as anticipated side effects, of this aerosol treatment are discussed and demonstrated to the patient.

## 2022-10-16 NOTE — PLAN OF CARE
AAOX4. Vanc infusing. Cardiac diet continued. Colostomy care. Medication given per MAR PRN Meds given for pain. Vital signs stable through the night. Weight shifting assistance provided. Safety maintained. Bed alarm set. Call bell within reach. Patient encouraged to call for assistance. Will continue to monitor.

## 2022-10-16 NOTE — ASSESSMENT & PLAN NOTE
Urine cultures growing Enterococcus faecalis sensitive to vancomycin and Pseudomonas    - cefepime day 5 for Pseudomonas aeruginosa (sensitive to cefepime, resistant to ciprofloxacin).  Also treating pneumonia.  - vancomycin IV day 3 as she has anaphylactic allergy to penicillin to cover Enterococcus.

## 2022-10-17 VITALS
BODY MASS INDEX: 34.57 KG/M2 | RESPIRATION RATE: 20 BRPM | SYSTOLIC BLOOD PRESSURE: 173 MMHG | HEIGHT: 58 IN | HEART RATE: 89 BPM | WEIGHT: 164.69 LBS | DIASTOLIC BLOOD PRESSURE: 74 MMHG | TEMPERATURE: 98 F | OXYGEN SATURATION: 91 %

## 2022-10-17 PROBLEM — J18.9 PNEUMONIA DUE TO INFECTIOUS ORGANISM: Status: RESOLVED | Noted: 2022-10-11 | Resolved: 2022-10-17

## 2022-10-17 PROBLEM — N39.0 URINARY TRACT INFECTION WITHOUT HEMATURIA: Status: RESOLVED | Noted: 2022-05-22 | Resolved: 2022-10-17

## 2022-10-17 PROBLEM — J96.01 ACUTE HYPOXEMIC RESPIRATORY FAILURE: Status: RESOLVED | Noted: 2022-10-11 | Resolved: 2022-10-17

## 2022-10-17 LAB
ANION GAP SERPL CALC-SCNC: 9 MMOL/L (ref 8–16)
BACTERIA BLD CULT: NORMAL
BUN SERPL-MCNC: 10 MG/DL (ref 8–23)
CALCIUM SERPL-MCNC: 9.5 MG/DL (ref 8.7–10.5)
CHLORIDE SERPL-SCNC: 96 MMOL/L (ref 95–110)
CO2 SERPL-SCNC: 27 MMOL/L (ref 23–29)
CREAT SERPL-MCNC: 0.7 MG/DL (ref 0.5–1.4)
EST. GFR  (NO RACE VARIABLE): >60 ML/MIN/1.73 M^2
GLUCOSE SERPL-MCNC: 107 MG/DL (ref 70–110)
POTASSIUM SERPL-SCNC: 4.5 MMOL/L (ref 3.5–5.1)
SODIUM SERPL-SCNC: 132 MMOL/L (ref 136–145)
VANCOMYCIN TROUGH SERPL-MCNC: 19.9 UG/ML (ref 10–22)

## 2022-10-17 PROCEDURE — 63600175 PHARM REV CODE 636 W HCPCS: Performed by: EMERGENCY MEDICINE

## 2022-10-17 PROCEDURE — 94799 UNLISTED PULMONARY SVC/PX: CPT

## 2022-10-17 PROCEDURE — 36415 COLL VENOUS BLD VENIPUNCTURE: CPT | Performed by: STUDENT IN AN ORGANIZED HEALTH CARE EDUCATION/TRAINING PROGRAM

## 2022-10-17 PROCEDURE — 80048 BASIC METABOLIC PNL TOTAL CA: CPT | Performed by: STUDENT IN AN ORGANIZED HEALTH CARE EDUCATION/TRAINING PROGRAM

## 2022-10-17 PROCEDURE — 25000003 PHARM REV CODE 250: Performed by: STUDENT IN AN ORGANIZED HEALTH CARE EDUCATION/TRAINING PROGRAM

## 2022-10-17 PROCEDURE — 25000003 PHARM REV CODE 250: Performed by: INTERNAL MEDICINE

## 2022-10-17 PROCEDURE — 80202 ASSAY OF VANCOMYCIN: CPT | Performed by: STUDENT IN AN ORGANIZED HEALTH CARE EDUCATION/TRAINING PROGRAM

## 2022-10-17 PROCEDURE — 94761 N-INVAS EAR/PLS OXIMETRY MLT: CPT

## 2022-10-17 RX ORDER — LEVOFLOXACIN 750 MG/1
750 TABLET ORAL DAILY
Qty: 2 TABLET | Refills: 0
Start: 2022-10-17 | End: 2022-10-19

## 2022-10-17 RX ORDER — ONDANSETRON 8 MG/1
8 TABLET, ORALLY DISINTEGRATING ORAL ONCE
Status: DISCONTINUED | OUTPATIENT
Start: 2022-10-17 | End: 2022-10-17 | Stop reason: HOSPADM

## 2022-10-17 RX ORDER — LEVOFLOXACIN 750 MG/1
750 TABLET ORAL ONCE
Status: COMPLETED | OUTPATIENT
Start: 2022-10-17 | End: 2022-10-17

## 2022-10-17 RX ORDER — DOXYCYCLINE 100 MG/1
100 CAPSULE ORAL EVERY 12 HOURS
Qty: 4 CAPSULE | Refills: 0
Start: 2022-10-17 | End: 2022-10-19

## 2022-10-17 RX ORDER — LOSARTAN POTASSIUM 50 MG/1
25 TABLET ORAL DAILY
Qty: 90 TABLET | Refills: 3
Start: 2022-10-17 | End: 2023-12-15 | Stop reason: DRUGHIGH

## 2022-10-17 RX ADMIN — CHOLECALCIFEROL TAB 25 MCG (1000 UNIT) 1000 UNITS: 25 TAB at 08:10

## 2022-10-17 RX ADMIN — APIXABAN 2.5 MG: 2.5 TABLET, FILM COATED ORAL at 08:10

## 2022-10-17 RX ADMIN — METOPROLOL TARTRATE 25 MG: 25 TABLET, FILM COATED ORAL at 08:10

## 2022-10-17 RX ADMIN — LEVOFLOXACIN 750 MG: 750 TABLET, FILM COATED ORAL at 11:10

## 2022-10-17 RX ADMIN — LEVOTHYROXINE SODIUM 125 MCG: 0.12 TABLET ORAL at 05:10

## 2022-10-17 RX ADMIN — FUROSEMIDE 20 MG: 20 TABLET ORAL at 08:10

## 2022-10-17 RX ADMIN — PRAVASTATIN SODIUM 20 MG: 20 TABLET ORAL at 08:10

## 2022-10-17 RX ADMIN — POTASSIUM CHLORIDE 20 MEQ: 1500 TABLET, EXTENDED RELEASE ORAL at 08:10

## 2022-10-17 RX ADMIN — CEFEPIME 2 G: 2 INJECTION, POWDER, FOR SOLUTION INTRAVENOUS at 03:10

## 2022-10-17 NOTE — PROGRESS NOTES
Pharmacokinetic Assessment Follow Up: IV Vancomycin    Vancomycin serum concentration assessment(s):    The trough level was drawn correctly and can be used to guide therapy at this time. The measurement is above the desired definitive target range of 10 to 15 mcg/mL.    Vancomycin Regimen Plan:    Change regimen to Vancomycin 1250 mg IV every 24 hours with next serum trough concentration measured at 1500 prior to 3rd dose on 10/19    Drug levels (last 3 results):  Recent Labs   Lab Result Units 10/15/22  1519 10/17/22  1449   Vancomycin-Trough ug/mL 13.7 19.9       Pharmacy will continue to follow and monitor vancomycin.    Please contact pharmacy at extension [phone] for questions regarding this assessment.    Thank you for the consult,   Ivette Dang       Patient brief summary:  Annette Garcia is a 87 y.o. female initiated on antimicrobial therapy with IV Vancomycin for treatment of urinary tract infection    The patient's current regimen is vancomycin 1500 mg IV q 24 hours    Drug Allergies:   Review of patient's allergies indicates:   Allergen Reactions    Adhesive Itching and Blisters    Penicillins Anaphylaxis    Tramadol Hives    Avelox [moxifloxacin] Rash     Facial and arm itching and redness. Pt states throat closes when given.    Amoxil [amoxicillin]     Aspridrox [aspirin, buffered]     Codeine Other (See Comments)     Throat swelling    Keflex [cephalexin]      Tolerated cefepime and cefazolin    Norvasc [amlodipine]     Red dye Hives    Robitussin [guaifenesin]     Sulfa (sulfonamide antibiotics)     Tylenol [acetaminophen]      Has reaction to Tylenol with red dye and unable to take Extra Strength Tylenol/ CAN ONLY TOLERATE REG STRENGTH TYLENOL    Vicks vaporub [camphor-eucalyptus oil-menthol]        Actual Body Weight:   74.7 kg    Renal Function:   Estimated Creatinine Clearance: 53.9 mL/min (based on SCr of 0.7 mg/dL).,     Dialysis Method (if applicable):  N/A    CBC (last 72 hours):  No  results for input(s): WHITE BLOOD CELL COUNT, HEMOGLOBIN, HEMATOCRIT, PLATELETS, GRAN%, LYMPH%, MONO%, EOSINOPHIL%, BASOPHIL%, DIFFERENTIAL METHOD in the last 72 hours.    Metabolic Panel (last 72 hours):  Recent Labs   Lab Result Units 10/17/22  0522   Sodium mmol/L 132*   Potassium mmol/L 4.5   Chloride mmol/L 96   CO2 mmol/L 27   Glucose mg/dL 107   BUN mg/dL 10   Creatinine mg/dL 0.7       Vancomycin Administrations:  vancomycin given in the last 96 hours                     vancomycin 1.5 g in dextrose 5 % 250 mL IVPB (ready to mix) (mg) 1,500 mg New Bag 10/16/22 1654     1,500 mg New Bag 10/15/22 1647      Restarted 10/14/22 1711     1,500 mg New Bag  1552    vancomycin (VANCOCIN) 1,750 mg in dextrose 5 % 500 mL IVPB (mg) 1,750 mg New Bag 10/13/22 1631                    Microbiologic Results:  Microbiology Results (last 7 days)       Procedure Component Value Units Date/Time    Blood culture #2 [[id number]] Collected: 10/11/22 1041    Order Status: Completed Specimen: Blood from Peripheral, Forearm, Right Updated: 10/16/22 1812     Blood Culture, Routine No growth after 5 days.    Blood culture #1 [[id number]] Collected: 10/12/22 0505    Order Status: Completed Specimen: Blood from Antecubital, Right Hand Updated: 10/16/22 1612     Blood Culture, Routine No Growth to date      No Growth to date      No Growth to date      No Growth to date      No Growth to date    Urine culture [[id number]]  (Abnormal)  (Susceptibility) Collected: 10/11/22 1245    Order Status: Completed Specimen: Urine Updated: 10/15/22 0838     Urine Culture, Routine ENTEROCOCCUS FAECALIS  50,000 - 99,999 cfu/ml        PSEUDOMONAS AERUGINOSA  10,000 - 49,999 cfu/ml      Narrative:      Specimen Source->Urine    Influenza A & B by Molecular [[id number]] Collected: 10/11/22 1059    Order Status: Completed Specimen: Nasopharyngeal Swab Updated: 10/11/22 1326     Influenza A, Molecular Negative     Influenza B, Molecular Negative     Flu A & B  Source Nasal swab

## 2022-10-17 NOTE — PLAN OF CARE
"Pt for d/c to [facility] today   CM spoke with Millie - she is awaiting orders - [facility]    CM awaiting her "ok"          10/17/22 1105   Final Note   Assessment Type Final Discharge Note   Anticipated Discharge Disposition Int Care Fac  ([facility])   What phone number can be called within the next 1-3 days to see how you are doing after discharge? [phone]   Hospital Resources/Appts/Education Provided Post-Acute resouces added to AVS   Post-Acute Status   Post-Acute Authorization Other  (return to [facility])   Post-Acute Placement Status Referrals Sent   Discharge Delays None known at this time       "

## 2022-10-17 NOTE — PLAN OF CARE
A&Ox4. Bed bound pt, generalized weakness. Continuous pulse ox, sats are staying 97-98%. Purewick in place, urine is yellow and clear. Safety maintained.         Problem: Adult Inpatient Plan of Care  Goal: Plan of Care Review  Outcome: Ongoing, Progressing  Goal: Patient-Specific Goal (Individualized)  Outcome: Ongoing, Progressing  Goal: Absence of Hospital-Acquired Illness or Injury  Outcome: Ongoing, Progressing

## 2022-10-17 NOTE — PLAN OF CARE
Pt is AAOX4. Scheduled meds were given per MAR. No C/O pain but pt vomited three times on the shift. Family is not present at the bedside. Bed in lowest position, bed alarm is set. Call light within reach. Nurse will continue to monitor.   Problem: Adult Inpatient Plan of Care  Goal: Readiness for Transition of Care  Outcome: Ongoing, Progressing     Problem: Infection (Pneumonia)  Goal: Resolution of Infection Signs and Symptoms  Outcome: Ongoing, Progressing     Problem: Skin Injury Risk Increased  Goal: Skin Health and Integrity  Outcome: Ongoing, Progressing     Problem: Fall Injury Risk  Goal: Absence of Fall and Fall-Related Injury  Outcome: Ongoing, Progressing     Problem: UTI (Urinary Tract Infection)  Goal: Improved Infection Symptoms  Outcome: Ongoing, Progressing     Problem: Hypertension Comorbidity  Goal: Blood Pressure in Desired Range  Outcome: Ongoing, Progressing

## 2022-10-17 NOTE — PLAN OF CARE
"Pt for d/c to [facility] today - [facility]   Daughter Lorenza informed;  pt signed Pt Choice form    Stretcher transport set up for "now"     Pt's nurse to call [facility] [phone]   - will speak with Violet -- SOPHIE oliver nurse.         10/17/22 1522   Final Note   Assessment Type Final Discharge Note   Anticipated Discharge Disposition SNF  ([facility])   What phone number can be called within the next 1-3 days to see how you are doing after discharge? [phone]   Hospital Resources/Appts/Education Provided Post-Acute resouces added to AVS   Post-Acute Status   Post-Acute Authorization Other   Post-Acute Placement Status Set-up Complete/Auth obtained   Discharge Delays None known at this time     "

## 2022-10-17 NOTE — PLAN OF CARE
[facility]    FACILITY TRANSFER ORDERS      Patient Name: Annette Garcia  YOB: 1935    PCP: Jose Valles MD   PCP Address: [address] / [place] [address]  PCP Phone Number: [phone]  PCP Fax: [phone]    Encounter Date: 10/17/2022    Admit to: Skilled Nursing Facility     Vital Signs:  Routine    Diagnoses:   Active Hospital Problems    Diagnosis  POA    h/o DVT (deep venous thrombosis) [I82.409]  Yes    Colostomy present [Z93.3]  Not Applicable    Atrial fibrillation [I48.91]  Yes    Seizure disorder [G40.909]  Yes     Epilepsy type unknown      Hypothyroidism [E03.9]  Yes      Resolved Hospital Problems    Diagnosis Date Resolved POA    *Acute hypoxemic respiratory failure [J96.01] 10/17/2022 Yes    Pneumonia due to infectious organism [J18.9] 10/17/2022 Yes    Urinary tract infection without hematuria [N39.0] 10/17/2022 Yes       Allergies:  Review of patient's allergies indicates:   Allergen Reactions    Adhesive Itching and Blisters    Penicillins Anaphylaxis    Tramadol Hives    Avelox [moxifloxacin] Rash     Facial and arm itching and redness. Pt states throat closes when given.    Amoxil [amoxicillin]     Aspridrox [aspirin, buffered]     Codeine Other (See Comments)     Throat swelling    Keflex [cephalexin]      Tolerated cefepime and cefazolin    Norvasc [amlodipine]     Red dye Hives    Robitussin [guaifenesin]     Sulfa (sulfonamide antibiotics)     Tylenol [acetaminophen]      Has reaction to Tylenol with red dye and unable to take Extra Strength Tylenol/ CAN ONLY TOLERATE REG STRENGTH TYLENOL    Vicks vaporub [camphor-eucalyptus oil-menthol]        Diet: cardiac diet    Activities: Activity as tolerated    Goals of Care Treatment Preferences:  Code Status: DNR    Health care agent: [facility] care agent number: No value filed.    Living Will: Yes              Nursing: Routine care. May need 1 L Oxygen for a few days but should be able to come off it  in a few days.      Labs: CBC and BMP Once in a week    CONSULTS:    Physical Therapy to evaluate and treat.  and Occupational Therapy to evaluate and treat.    MISCELLANEOUS CARE:  Colostomy Care: Empty bag every shift and PRN. Change and clean site every 48 hours.     WOUND CARE ORDERS  None    Medications: Review discharge medications with patient and family and provide education.      Current Discharge Medication List        START taking these medications    Details   levoFLOXacin (LEVAQUIN) 750 MG tablet Take 1 tablet (750 mg total) by mouth once daily. for 2 days  Qty: 2 tablet, Refills: 0           CONTINUE these medications which have CHANGED    Details   doxycycline (VIBRAMYCIN) 100 MG Cap Take 1 capsule (100 mg total) by mouth every 12 (twelve) hours. for 2 days  Qty: 4 capsule, Refills: 0      losartan (COZAAR) 50 MG tablet Take 0.5 tablets (25 mg total) by mouth once daily.  Qty: 90 tablet, Refills: 3    Comments: .           CONTINUE these medications which have NOT CHANGED    Details   acetaminophen (TYLENOL) 325 MG tablet Take 2 tablets (650 mg total) by mouth every 4 (four) hours as needed for Pain.  Refills: 0      apixaban (ELIQUIS) 2.5 mg Tab Take 1 tablet (2.5 mg total) by mouth 2 (two) times daily.  Qty:        calcium-vitamin D 600 mg(1,500mg) -400 unit Tab Take 1 tablet by mouth once daily.      furosemide (LASIX) 20 MG tablet Take 20 mg by mouth once daily.      guaiFENesin (MUCINEX) 600 mg 12 hr tablet Take 600 mg by mouth 2 (two) times daily. For 10 days      levothyroxine (SYNTHROID) 125 MCG tablet TAKE 1 TABLET (125 MCG TOTAL) BY MOUTH ONCE DAILY.  Qty: 90 tablet, Refills: 3      magnesium oxide (MAG-OX) 400 mg (241.3 mg magnesium) tablet Take 2 tablets (800 mg total) by mouth once daily.  Refills: 0      metoprolol tartrate (LOPRESSOR) 25 MG tablet Take 1 tablet (25 mg total) by mouth 2 (two) times daily.  Qty: 60 tablet, Refills: 11    Comments: .      ondansetron (ZOFRAN) 8 MG tablet  Take 8 mg by mouth every 6 (six) hours as needed for Nausea (and vomiting).      OXcarbazepine (TRILEPTAL) 150 MG Tab Take 150 mg by mouth nightly.      PHENobarbitaL 97.2 MG tablet Take 1 tablet (97.2 mg total) by mouth once daily at 6am.  Qty: 90 tablet, Refills: 0      phenoL (CHLORASEPTIC) 0.5 % SprA by Mucous Membrane route 2 (two) times daily as needed (sore throat).      polyethylene glycol (GLYCOLAX) 17 gram PwPk Take 17 g by mouth once daily.  Qty: 30 packet, Refills: 5      potassium chloride SA (K-DUR,KLOR-CON) 20 MEQ tablet Take 20 mEq by mouth once daily.      pravastatin (PRAVACHOL) 20 MG tablet Take 20 mg by mouth once daily.      vitamin D (VITAMIN D3) 1000 units Tab Take 1,000 Units by mouth once daily.      miconazole NITRATE 2 % (MICOTIN) 2 % top powder Apply topically 2 (two) times daily. Apply to bilateral under breast  Qty: 85 g, Refills: 0           STOP taking these medications       oxyCODONE (ROXICODONE) 5 MG immediate release tablet Comments:   Reason for Stopping:                  Immunizations Administered as of 10/17/2022       No immunizations on file.          _________________________________  Clarice Holloway MD  10/17/2022

## 2022-10-17 NOTE — DISCHARGE SUMMARY
[facility] Medicine  Discharge Summary      Patient Name: Annette Garcia  MRN: [id number]  Patient Class: IP- Inpatient  Admission Date: 10/11/2022  Hospital Length of Stay: 6 days  Discharge Date and Time: No discharge date for patient encounter.  Attending Physician: Clarice Holloway MD   Discharging Provider: Clarice Holloway MD  Primary Care Provider: Jose Valles MD      HPI:   This is an 86-year-old female with a history of CAD, hypertension, atrial fibrillation on Eliquis, seizure disorder, hypothyroidism, colostomy presents to the emergency department from an outside nursing facility for cough, hypoxemia, altered mental status.  She has been having a cough for approximately 2 days now.  The cough is productive.  She does feel short of breath.  She denies any recent sick contacts.  There has not been any issues with fever, chest pain, chills, abdominal pain, nausea or vomiting.  A chest x-ray at a nursing facility did show possible pneumonia and she was started on doxycycline.  She has no history of being on home oxygen supplementation and she was hypoxic in the emergency department in the high 80s.  VBG did not show any CO2 retention.  She is also noted to have an episode of AFib with RVR.  Oral dose of metoprolol was given.      * No surgery found *      Hospital Course:   87-year-old female with PMH of CAD, hypertension, atrial fibrillation on Eliquis, seizure disorder, hypothyroidism, recent history of high-grade SBO s/p primary parastomal hernia repair in ostomy who presented to the ER with shortness of breath and found to have a developing left lower lobe consolidation on chest x-ray and respiratory failure requiring 2 L oxygen via nasal cannula.  She was treated with broad-spectrum antibiotics and completed 5 days of IV antibiotics.  UA grew Pseudomonas and Enterococcus faecalis for which she completed 3 days of appropriate IV antibiotics for uncomplicated UTI.  She was weaned to  room air at discharge but was noted to have a persist cough.    She remained hemodynamically stable, afebrile and was medically ready for discharge when she had 3 episodes of emesis.  She was able to tolerate food thereafter, had a nontender abdomen, had normal colostomy output and normal bowel sounds.  Etiology of emesis unclear but patient wanted to be discharged as she thinks she will get better at the nursing home rather than stay in the hospital.  Discussed this with patient's daughter who is in agreement with discharge to the nursing home though understands she will be brought back to the hospital in case continues to have GI symptoms.    Recommendations for follow up :    - discharged on 2 days of levofloxacin and doxycycline to complete 7 days for presumed nosocomial pneumonia.  Discussed with pharmacy him that patient has tolerated levofloxacin as an outpatient before though moxifloxacin is listed on the allergy    - losartan dose decreased to half 25 mg q.d..  Monitor blood pressure and up titrate as deemed appropriate     Constitutional:       General: She is not in acute distress.     Appearance: She is obese.   HENT:      Head: Normocephalic and atraumatic.      Mouth/Throat:      Mouth: Mucous membranes are moist.   Eyes:      General: No scleral icterus.  Cardiovascular:      Rate and Rhythm: Normal rate and regular rhythm.      Pulses: Normal pulses.      Heart sounds: Normal heart sounds. No murmur heard.  Pulmonary:      Effort: Pulmonary effort is normal. No respiratory distress.      Breath sounds: No wheezing or rales.      Comments: Coughing while taking a deep breath  Abdominal:      Palpations: Abdomen is soft.      Tenderness: There is no abdominal tenderness.  Bowel sounds present.     Comments: Colostomy bag in place with brown stool   Musculoskeletal:      Cervical back: Neck supple.      Right lower leg: No edema.      Left lower leg: No edema.   Skin:     General: Skin is warm and dry.       Findings: No bruising or rash.   Neurological:      General: No focal deficit present.      Mental Status: She is alert and oriented to person, place, and time.   Psychiatric:         Mood and Affect: Mood normal.     Goals of Care Treatment Preferences:  Code Status: DNR    Health care agent: Beatris  Health care agent number: No value filed.    Living Will: Yes              Consults:   Consults (From admission, onward)        Status Ordering Provider     Pharmacy to dose Vancomycin consult  Once        Provider:  (Not yet assigned)   See [facility] for full Linked Orders Report.    Acknowledged HOLLY ANGELES     Inpatient consult to Social Work  Once        Provider:  (Not yet assigned)    Acknowledged HOLLY ANGELES            Final Active Diagnoses:    Diagnosis Date Noted POA    h/o DVT (deep venous thrombosis) [I82.409] 10/12/2022 Yes    Colostomy present [Z93.3] 09/14/2020 Not Applicable    Atrial fibrillation [I48.91] 12/28/2019 Yes    Seizure disorder [G40.909] 08/18/2016 Yes    Hypothyroidism [E03.9] 08/18/2016 Yes      Problems Resolved During this Admission:    Diagnosis Date Noted Date Resolved POA    PRINCIPAL PROBLEM:  Acute hypoxemic respiratory failure [J96.01] 10/11/2022 10/17/2022 Yes    Pneumonia due to infectious organism [J18.9] 10/11/2022 10/17/2022 Yes    Urinary tract infection without hematuria [N39.0] 05/22/2022 10/17/2022 Yes       Discharged Condition: fair    Disposition: Skilled Nursing Facility    Follow Up:   Follow-up Information     [facility] Follow up.    Why: Nursing Home  Contact information:   [address]  [facility] [address]                     Patient Instructions:      Ambulatory referral/consult to Family Practice   Standing Status: Future   Referral Priority: Routine Referral Type: Consultation   Referral Reason: Specialty Services Required   Requested Specialty: Family Medicine   Number of Visits Requested: 1        Significant Diagnostic Studies: Labs:   BMP:   Recent Labs   Lab 10/17/22  0522      *   K 4.5   CL 96   CO2 27   BUN 10   CREATININE 0.7   CALCIUM 9.5    and CBC No results for input(s): WBC, HGB, HCT, PLT in the last 48 hours.    Pending Diagnostic Studies:     None         Medications:  Reconciled Home Medications:      Medication List      START taking these medications    levoFLOXacin 750 MG tablet  Commonly known as: LEVAQUIN  Take 1 tablet (750 mg total) by mouth once daily. for 2 days        CHANGE how you take these medications    doxycycline 100 MG Cap  Commonly known as: VIBRAMYCIN  Take 1 capsule (100 mg total) by mouth every 12 (twelve) hours. for 2 days  What changed:   · when to take this  · additional instructions     losartan 50 MG tablet  Commonly known as: COZAAR  Take 0.5 tablets (25 mg total) by mouth once daily.  What changed: how much to take        CONTINUE taking these medications    acetaminophen 325 MG tablet  Commonly known as: TYLENOL  Take 2 tablets (650 mg total) by mouth every 4 (four) hours as needed for Pain.     apixaban 2.5 mg Tab  Commonly known as: ELIQUIS  Take 1 tablet (2.5 mg total) by mouth 2 (two) times daily.     calcium-vitamin D 600 mg-10 mcg (400 unit) Tab  Take 1 tablet by mouth once daily.     CHLORASEPTIC 0.5 % Spra  Generic drug: phenoL  by Mucous Membrane route 2 (two) times daily as needed (sore throat).     furosemide 20 MG tablet  Commonly known as: LASIX  Take 20 mg by mouth once daily.     guaiFENesin 600 mg 12 hr tablet  Commonly known as: MUCINEX  Take 600 mg by mouth 2 (two) times daily. For 10 days     levothyroxine 125 MCG tablet  Commonly known as: SYNTHROID  TAKE 1 TABLET (125 MCG TOTAL) BY MOUTH ONCE DAILY.     magnesium oxide 400 mg (241.3 mg magnesium) tablet  Commonly known as: MAG-OX  Take 2 tablets (800 mg total) by mouth once daily.     metoprolol tartrate 25 MG tablet  Commonly known as: LOPRESSOR  Take 1 tablet (25 mg total)  by mouth 2 (two) times daily.     miconazole NITRATE 2 % 2 % top powder  Commonly known as: MICOTIN  Apply topically 2 (two) times daily. Apply to bilateral under breast     ondansetron 8 MG tablet  Commonly known as: ZOFRAN  Take 8 mg by mouth every 6 (six) hours as needed for Nausea (and vomiting).     OXcarbazepine 150 MG Tab  Commonly known as: TRILEPTAL  Take 150 mg by mouth nightly.     polyethylene glycol 17 gram Pwpk  Commonly known as: GLYCOLAX  Take 17 g by mouth once daily.     potassium chloride SA 20 MEQ tablet  Commonly known as: K-DUR,KLOR-CON  Take 20 mEq by mouth once daily.     pravastatin 20 MG tablet  Commonly known as: PRAVACHOL  Take 20 mg by mouth once daily.     vitamin D 1000 units Tab  Commonly known as: VITAMIN D3  Take 1,000 Units by mouth once daily.        STOP taking these medications    oxyCODONE 5 MG immediate release tablet  Commonly known as: ROXICODONE        ASK your doctor about these medications    PHENobarbitaL 97.2 MG tablet  Take 1 tablet (97.2 mg total) by mouth once daily at 6am.            Indwelling Lines/Drains at time of discharge:   Lines/Drains/Airways     Drain  Duration                Colostomy 10/29/19 1200 LLQ 1084 days    Female External Urinary Catheter 10/15/22 1413 2 days                Time spent on the discharge of patient: 45 minutes         Clarice Holloway MD  Department of Hospital Medicine  [facility]

## 2022-10-17 NOTE — PLAN OF CARE
Patient on RA in no apparent distress, given IS treatment, no adverse reactions will continue to monitor.

## 2022-10-17 NOTE — HOSPITAL COURSE
87-year-old female with PMH of CAD, hypertension, atrial fibrillation on Eliquis, seizure disorder, hypothyroidism, recent history of high-grade SBO s/p primary parastomal hernia repair in ostomy who presented to the ER with shortness of breath and found to have a developing left lower lobe consolidation on chest x-ray and respiratory failure requiring 2 L oxygen via nasal cannula.  She was treated with broad-spectrum antibiotics and completed 5 days of IV antibiotics.  UA grew Pseudomonas and Enterococcus faecalis for which she completed 3 days of appropriate IV antibiotics for uncomplicated UTI.  She was weaned to room air at discharge but was noted to have a persist cough.    She remained hemodynamically stable, afebrile and was medically ready for discharge when she had 3 episodes of emesis.  She was able to tolerate food thereafter, had a nontender abdomen, had normal colostomy output and normal bowel sounds.  Etiology of emesis unclear but patient wanted to be discharged as she thinks she will get better at the nursing home rather than stay in the hospital.  Discussed this with patient's daughter who is in agreement with discharge to the nursing home though understands she will be brought back to the hospital in case continues to have GI symptoms.    Recommendations for follow up :    - discharged on 2 days of levofloxacin and doxycycline to complete 7 days for presumed nosocomial pneumonia.  Discussed with pharmacy him that patient has tolerated levofloxacin as an outpatient before though moxifloxacin is listed on the allergy    - losartan dose decreased to half 25 mg q.d..  Monitor blood pressure and up titrate as deemed appropriate

## 2022-10-18 ENCOUNTER — PATIENT OUTREACH (OUTPATIENT)
Dept: ADMINISTRATIVE | Facility: OTHER | Age: 87
End: 2022-10-18
Payer: MEDICARE

## 2022-10-18 NOTE — PROGRESS NOTES
IP Liaison - Final Visit Note    Patient: Annette Garcia  MRN:  [id number]  Date of Service:  10/18/2022  Completed by:  PEPITO David    Reason for Visit   Patient presents with    IP Liaison Chart Review        Patient Summary     Discharge Date: 10/17/2022  Discharge telephone number/address: [phone] / [facility]  Follow up provider: n/a  Follow up appointments: n/a  Home Health agency & telephone number: n/a  DME ordered &  name: n/a  Assigned OPCM RN/SW: n/a  Report sent to follow up team (PCP/OPCM) via in basket message: n/a  Community Resources arranged including agency name & contact info: n/a      PEPITO David

## 2023-02-14 ENCOUNTER — HOSPITAL ENCOUNTER (OUTPATIENT)
Facility: HOSPITAL | Age: 88
Discharge: HOME | End: 2023-02-16
Attending: EMERGENCY MEDICINE | Admitting: HOSPITALIST
Payer: MEDICARE

## 2023-02-14 DIAGNOSIS — K92.0 COFFEE GROUND EMESIS: ICD-10-CM

## 2023-02-14 DIAGNOSIS — R11.10 INTRACTABLE VOMITING: ICD-10-CM

## 2023-02-14 DIAGNOSIS — K43.3 PARASTOMAL HERNIA WITH OBSTRUCTION AND WITHOUT GANGRENE: Primary | ICD-10-CM

## 2023-02-14 DIAGNOSIS — R11.10 VOMITING: ICD-10-CM

## 2023-02-14 LAB
ALBUMIN SERPL BCP-MCNC: 4.1 G/DL (ref 3.5–5.2)
ALP SERPL-CCNC: 85 U/L (ref 55–135)
ALT SERPL W/O P-5'-P-CCNC: 11 U/L (ref 10–44)
ANION GAP SERPL CALC-SCNC: 12 MMOL/L (ref 8–16)
APTT BLDCRRT: 33.6 SEC (ref 21–32)
AST SERPL-CCNC: 14 U/L (ref 10–40)
BACTERIA #/AREA URNS HPF: ABNORMAL /HPF
BASOPHILS # BLD AUTO: 0.01 K/UL (ref 0–0.2)
BASOPHILS NFR BLD: 0.1 % (ref 0–1.9)
BILIRUB SERPL-MCNC: 0.3 MG/DL (ref 0.1–1)
BILIRUB UR QL STRIP: NEGATIVE
BUN SERPL-MCNC: 18 MG/DL (ref 8–23)
CALCIUM SERPL-MCNC: 10.1 MG/DL (ref 8.7–10.5)
CHLORIDE SERPL-SCNC: 91 MMOL/L (ref 95–110)
CLARITY UR: ABNORMAL
CO2 SERPL-SCNC: 31 MMOL/L (ref 23–29)
COLOR UR: YELLOW
CREAT SERPL-MCNC: 1.2 MG/DL (ref 0.5–1.4)
DIFFERENTIAL METHOD: ABNORMAL
EOSINOPHIL # BLD AUTO: 0 K/UL (ref 0–0.5)
EOSINOPHIL NFR BLD: 0 % (ref 0–8)
ERYTHROCYTE [DISTWIDTH] IN BLOOD BY AUTOMATED COUNT: 12.9 % (ref 11.5–14.5)
EST. GFR  (NO RACE VARIABLE): 44 ML/MIN/1.73 M^2
GLUCOSE SERPL-MCNC: 145 MG/DL (ref 70–110)
GLUCOSE UR QL STRIP: NEGATIVE
HCT VFR BLD AUTO: 37 % (ref 37–48.5)
HGB BLD-MCNC: 12.4 G/DL (ref 12–16)
HGB UR QL STRIP: NEGATIVE
HYALINE CASTS #/AREA URNS LPF: 0 /LPF
IMM GRANULOCYTES # BLD AUTO: 0.04 K/UL (ref 0–0.04)
IMM GRANULOCYTES NFR BLD AUTO: 0.3 % (ref 0–0.5)
INR PPP: 1.1 (ref 0.8–1.2)
KETONES UR QL STRIP: NEGATIVE
LEUKOCYTE ESTERASE UR QL STRIP: ABNORMAL
LIPASE SERPL-CCNC: 7 U/L (ref 4–60)
LYMPHOCYTES # BLD AUTO: 1.8 K/UL (ref 1–4.8)
LYMPHOCYTES NFR BLD: 14 % (ref 18–48)
MCH RBC QN AUTO: 32.4 PG (ref 27–31)
MCHC RBC AUTO-ENTMCNC: 33.5 G/DL (ref 32–36)
MCV RBC AUTO: 97 FL (ref 82–98)
MICROSCOPIC COMMENT: ABNORMAL
MONOCYTES # BLD AUTO: 0.8 K/UL (ref 0.3–1)
MONOCYTES NFR BLD: 5.8 % (ref 4–15)
NEUTROPHILS # BLD AUTO: 10.3 K/UL (ref 1.8–7.7)
NEUTROPHILS NFR BLD: 79.8 % (ref 38–73)
NITRITE UR QL STRIP: NEGATIVE
NRBC BLD-RTO: 0 /100 WBC
PH UR STRIP: 7 [PH] (ref 5–8)
PLATELET # BLD AUTO: 229 K/UL (ref 150–450)
PMV BLD AUTO: 9.6 FL (ref 9.2–12.9)
POTASSIUM SERPL-SCNC: 4.8 MMOL/L (ref 3.5–5.1)
PROT SERPL-MCNC: 7.6 G/DL (ref 6–8.4)
PROT UR QL STRIP: ABNORMAL
PROTHROMBIN TIME: 11.2 SEC (ref 9–12.5)
RBC # BLD AUTO: 3.83 M/UL (ref 4–5.4)
RBC #/AREA URNS HPF: 0 /HPF (ref 0–4)
SODIUM SERPL-SCNC: 134 MMOL/L (ref 136–145)
SP GR UR STRIP: 1.01 (ref 1–1.03)
SQUAMOUS #/AREA URNS HPF: 4 /HPF
URN SPEC COLLECT METH UR: ABNORMAL
UROBILINOGEN UR STRIP-ACNC: NEGATIVE EU/DL
WBC # BLD AUTO: 12.95 K/UL (ref 3.9–12.7)
WBC #/AREA URNS HPF: >100 /HPF (ref 0–5)

## 2023-02-14 PROCEDURE — 99285 EMERGENCY DEPT VISIT HI MDM: CPT | Mod: 25

## 2023-02-14 PROCEDURE — 85025 COMPLETE CBC W/AUTO DIFF WBC: CPT | Performed by: EMERGENCY MEDICINE

## 2023-02-14 PROCEDURE — 85610 PROTHROMBIN TIME: CPT | Performed by: EMERGENCY MEDICINE

## 2023-02-14 PROCEDURE — 81000 URINALYSIS NONAUTO W/SCOPE: CPT | Performed by: EMERGENCY MEDICINE

## 2023-02-14 PROCEDURE — 36415 COLL VENOUS BLD VENIPUNCTURE: CPT | Performed by: NURSE PRACTITIONER

## 2023-02-14 PROCEDURE — G0378 HOSPITAL OBSERVATION PER HR: HCPCS

## 2023-02-14 PROCEDURE — 86900 BLOOD TYPING SEROLOGIC ABO: CPT | Performed by: NURSE PRACTITIONER

## 2023-02-14 PROCEDURE — 85730 THROMBOPLASTIN TIME PARTIAL: CPT | Performed by: EMERGENCY MEDICINE

## 2023-02-14 PROCEDURE — 93010 ELECTROCARDIOGRAM REPORT: CPT | Mod: ,,, | Performed by: INTERNAL MEDICINE

## 2023-02-14 PROCEDURE — 96361 HYDRATE IV INFUSION ADD-ON: CPT

## 2023-02-14 PROCEDURE — 83690 ASSAY OF LIPASE: CPT | Performed by: EMERGENCY MEDICINE

## 2023-02-14 PROCEDURE — 63600175 PHARM REV CODE 636 W HCPCS: Performed by: EMERGENCY MEDICINE

## 2023-02-14 PROCEDURE — 80053 COMPREHEN METABOLIC PANEL: CPT | Performed by: EMERGENCY MEDICINE

## 2023-02-14 PROCEDURE — 93005 ELECTROCARDIOGRAM TRACING: CPT

## 2023-02-14 PROCEDURE — 87086 URINE CULTURE/COLONY COUNT: CPT | Performed by: EMERGENCY MEDICINE

## 2023-02-14 PROCEDURE — 93010 EKG 12-LEAD: ICD-10-PCS | Mod: ,,, | Performed by: INTERNAL MEDICINE

## 2023-02-14 RX ORDER — POTASSIUM CHLORIDE 20 MEQ/1
20 TABLET, EXTENDED RELEASE ORAL DAILY
Status: DISCONTINUED | OUTPATIENT
Start: 2023-02-15 | End: 2023-02-16 | Stop reason: HOSPADM

## 2023-02-14 RX ORDER — PANTOPRAZOLE SODIUM 40 MG/10ML
40 INJECTION, POWDER, LYOPHILIZED, FOR SOLUTION INTRAVENOUS 2 TIMES DAILY
Status: DISCONTINUED | OUTPATIENT
Start: 2023-02-15 | End: 2023-02-16 | Stop reason: HOSPADM

## 2023-02-14 RX ORDER — FUROSEMIDE 20 MG/1
20 TABLET ORAL DAILY
Status: DISCONTINUED | OUTPATIENT
Start: 2023-02-15 | End: 2023-02-16 | Stop reason: HOSPADM

## 2023-02-14 RX ORDER — CHOLECALCIFEROL (VITAMIN D3) 25 MCG
1000 TABLET ORAL DAILY
Status: DISCONTINUED | OUTPATIENT
Start: 2023-02-15 | End: 2023-02-16 | Stop reason: HOSPADM

## 2023-02-14 RX ORDER — PHENOBARBITAL 32.4 MG/1
97.2 TABLET ORAL NIGHTLY
Status: DISCONTINUED | OUTPATIENT
Start: 2023-02-15 | End: 2023-02-16 | Stop reason: HOSPADM

## 2023-02-14 RX ORDER — PRAVASTATIN SODIUM 20 MG/1
20 TABLET ORAL DAILY
Status: DISCONTINUED | OUTPATIENT
Start: 2023-02-15 | End: 2023-02-16 | Stop reason: HOSPADM

## 2023-02-14 RX ORDER — POLYETHYLENE GLYCOL 3350 17 G/17G
17 POWDER, FOR SOLUTION ORAL DAILY
Status: DISCONTINUED | OUTPATIENT
Start: 2023-02-15 | End: 2023-02-16 | Stop reason: HOSPADM

## 2023-02-14 RX ORDER — SODIUM CHLORIDE, SODIUM LACTATE, POTASSIUM CHLORIDE, CALCIUM CHLORIDE 600; 310; 30; 20 MG/100ML; MG/100ML; MG/100ML; MG/100ML
1000 INJECTION, SOLUTION INTRAVENOUS
Status: DISCONTINUED | OUTPATIENT
Start: 2023-02-14 | End: 2023-02-14

## 2023-02-14 RX ORDER — OXCARBAZEPINE 150 MG/1
150 TABLET, FILM COATED ORAL NIGHTLY
Status: DISCONTINUED | OUTPATIENT
Start: 2023-02-15 | End: 2023-02-16 | Stop reason: HOSPADM

## 2023-02-14 RX ADMIN — SODIUM CHLORIDE, SODIUM LACTATE, POTASSIUM CHLORIDE, AND CALCIUM CHLORIDE 1000 ML: .6; .31; .03; .02 INJECTION, SOLUTION INTRAVENOUS at 05:02

## 2023-02-14 NOTE — Clinical Note
Diagnosis: Coffee ground emesis [[id number]]   Future Attending Provider: AMAN HUANG [[id number]]   Admitting Provider:: AMAN HUANG [[id number]]

## 2023-02-14 NOTE — ED PROVIDER NOTES
Encounter Date: 2/14/2023       History     Chief Complaint   Patient presents with    Vomiting     C/o N/V since yesterday with coffee ground emesis reported today. Presents awake, alert, oriented. Denies pain. States nausea has subsided and last emesis about 1 hour ago.      87-year-old female who presents from [facility] with concerns for coffee-ground emesis.  Patient has been vomiting since yesterday and apparently was clear yesterday however she states she is vomited about 30-40 times today and had coffee-ground emesis on her jacket that was removed on arrival.  She currently states that she has no nausea.  She denies any abdominal pain.  She does have a history of colostomy and states that she does have stool in her colostomy.  She denies any fevers/chills.  She denies any dysuria/hematuria dysuria/urinary frequency.  She is on Eliquis.    Review of patient's allergies indicates:   Allergen Reactions    Adhesive Itching and Blisters    Penicillins Anaphylaxis    Tramadol Hives    Avelox [moxifloxacin] Rash     Facial and arm itching and redness. Pt states throat closes when given.    Amoxil [amoxicillin]     Aspridrox [aspirin, buffered]     Codeine Other (See Comments)     Throat swelling    Keflex [cephalexin]      Tolerated cefepime and cefazolin    Norvasc [amlodipine]     Red dye Hives    Robitussin [guaifenesin]     Sulfa (sulfonamide antibiotics)     Tylenol [acetaminophen]      Has reaction to Tylenol with red dye and unable to take Extra Strength Tylenol/ CAN ONLY TOLERATE REG STRENGTH TYLENOL    Vicks vaporub [camphor-eucalyptus oil-menthol]      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    In the  setting of septic shock and near death    Petit mal epilepsy 1954    Scoliosis of lumbar spine     Seizures     Unspecified hypothyroidism     UTI (urinary tract infection) 05/22/2022     Past Surgical History:   Procedure Laterality Date    APPENDECTOMY      BACK SURGERY  1988    vertebral fracture    BACK SURGERY  02/2013    lumbar L2-5    CATARACT EXTRACTION, BILATERAL Bilateral     CHOLECYSTECTOMY      open    EYE SURGERY Bilateral     cataract removal with lens implant    HYSTERECTOMY      PERCUTANEOUS TRANSLUMINAL ANGIOPLASTY (PTA) OF PERIPHERAL VESSEL N/A 2/3/2020    Procedure: PTA, PERIPHERAL VESSEL;  Surgeon: Timmy Simmons MD;  Location: [facility] CATH LAB/EP;  Service: Cardiology;  Laterality: N/A;    PORTACATH PLACEMENT Right 09/2016    RENAL ARTERY STENT Left 07/19/2017    SIGMOIDECTOMY  10/29/2019    SMALL INTESTINE SURGERY  08/23/2016    THROMBECTOMY N/A 2/3/2020    Procedure: THROMBECTOMY;  Surgeon: Timmy Simmons MD;  Location: [facility] CATH LAB/EP;  Service: Cardiology;  Laterality: N/A;    TONSILLECTOMY      VAGINAL HYSTERECTOMY W/ ANTERIOR AND POSTERIOR VAGINAL REPAIR       Family History   Problem Relation Age of Onset    Pancreatic cancer Mother     Hypertension Father     Heart attack Father      Social History     Tobacco Use    Smoking status: Never    Smokeless tobacco: Never   Substance Use Topics    Alcohol use: No    Drug use: No     Review of Systems   Gastrointestinal:  Positive for abdominal pain.     Physical Exam     Initial Vitals [02/14/23 1625]   BP Pulse Resp Temp SpO2   115/66 94 20 99.1 °F (37.3 °C) 100 %      MAP       --         Physical Exam    Nursing note and vitals reviewed.  Constitutional: She appears well-developed and well-nourished.   Cardiovascular:  Normal rate and regular rhythm.           No murmur heard.  Pulmonary/Chest: Breath sounds normal. She has no wheezes.   Abdominal: Abdomen is soft.   There is no tenderness to palpation, there is a colostomy noted to the  left lower quadrant that appears clean dry and intact with no signs of infection or active bleeding.   Musculoskeletal:         General: Normal range of motion.     Neurological: She is alert and oriented to person, place, and time.   Skin: Skin is warm and dry.   Psychiatric: She has a normal mood and affect.       ED Course   Procedures  Labs Reviewed   CBC W/ AUTO DIFFERENTIAL - Abnormal; Notable for the following components:       Result Value    WBC 12.95 (*)     RBC 3.83 (*)     MCH 32.4 (*)     Gran # (ANC) 10.3 (*)     Gran % 79.8 (*)     Lymph % 14.0 (*)     All other components within normal limits   COMPREHENSIVE METABOLIC PANEL - Abnormal; Notable for the following components:    Sodium 134 (*)     Chloride 91 (*)     CO2 31 (*)     Glucose 145 (*)     eGFR 44 (*)     All other components within normal limits   APTT - Abnormal; Notable for the following components:    aPTT 33.6 (*)     All other components within normal limits   LIPASE   PROTIME-INR   URINALYSIS, REFLEX TO URINE CULTURE     EKG Readings: (Independently Interpreted)   Sinus rhythm rate of 88 left axis, no ST-T wave abnormalities interpreted by me.     Imaging Results              CT Abdomen Pelvis  Without Contrast (Final result)  Result time 02/14/23 21:00:43      Final result by Tatiana Cheema MD (02/14/23 21:00:43)                   Impression:      No convincing significant acute bowel obstruction, transmural inflammation or other acute abnormality involving the GI tract in this patient with vomiting.  There are few loops of borderline caliber small bowel in the mid abdomen at this time without a transition point and this finding is nonspecific.    Postoperative changes of left lower quadrant colostomy with stable parastomal hernia containing loops of small bowel as described.  No concerning obstructive changes/incarceration seen.    Again noted is an enterocolic anastomosis in the midline lower abdomen and Sohail's  pouch.    Postoperative changes of cholecystectomy, hysterectomy and left iliac vein stenting as described.    No convincing acute focal visceral lesions.    Hiatal hernia.    Thoracolumbar remote compression fractures, osteopenia as well as spondylosis with lumbar stenoses as above.    Please see above discussion for additional incidental nonacute findings.      Electronically signed by: Tatiana Anette  Date:    02/14/2023  Time:    21:00               Narrative:    EXAMINATION:  CT ABDOMEN PELVIS WITHOUT CONTRAST    CLINICAL HISTORY:  Bowel obstruction suspected;    TECHNIQUE:  Low dose axial images, sagittal and coronal reformations were obtained from the lung bases to the pubic symphysis.    COMPARISON:    CT abdomen pelvis 08/31/2022, 8/29/22, 05/30/2022    FINDINGS:  Abdomen:    - Lower thorax/lung bases:A 5 mm stable cavitary nodule in the lingula again noted unchanged since 09/13/2020 as seen on axial image 15 series 2.  Stable bands of scarring/atelectasis in the middle lobe and bilateral lower lobes also again noted as well as dependent atelectasis.  There is no pleural effusion, evidence of cardiomegaly or pericardial effusion.    The lack of intravenous contrast limits evaluation of the solid organs for focal lesions.    - Liver: As imaged the liver appears homogeneous and nonenlarged.    - Gallbladder: Patient is post cholecystectomy.    - Bile Ducts: There is no evidence of intra or extra hepatic biliary ductal dilation.    - Spleen: Negative.    - Kidneys: There is no appreciable cortical mass or hydronephrosis.  No visible opaque renal calculi are seen in the kidneys or ureters.    - Adrenals: Unremarkable.    - Pancreas: There is no appreciable mass or peripancreatic fat stranding.  There is some fatty replacement of the pancreas.    - Retroperitoneum:  No significant adenopathy.    - Vascular: No abdominal aortic aneurysm.  There is moderate abdominal aortic atherosclerosis extending to the iliac  arteries bilaterally.  There is a left common iliac vein and external iliac vein stent again noted.    -Bowel/Mesentery: There is a small hiatal hernia.  There is a left lower quadrant colostomy with parastomal hernia containing fat and 2 loops of small bowel.  There are no acute detrimental changes in this region as compared to most recent prior CT.  There is slight prominence in the caliber of a few small bowel loops in the mid abdomen measuring up to 3 cm in diameter but there is no evidence of transmural inflammatory changes in the mesenteric fat, extraluminal free air or free fluid in the abdomen or pelvis.  There is no convincing high-grade bowel obstruction at this time.    There is an apparent enterocolic bowel anastomosis midline in the lower abdomen.  An overlying midline surgical scar is seen above and below the umbilicus.  There is a Sohail's pouch.    Pelvis:    Bladder is unremarkable as imaged.  Uterus is surgically absent.  There are no adnexal masses and there is no pelvic adenopathy or free fluid.    Bones: There is osteopenia and stable compression fractures of the L2 and T11 vertebrae noted as well as spondylosis of the spine without significant malalignment.  Spinal stenosis at the L1-L2 level with canal and bilateral neural foraminal stenosis.  Milder stenoses are seen at additional levels in the lumbar spine.  No suspicious lytic or sclerotic osseous lesions are seen.  Degenerative changes of the hips noted.                                       X-Ray Chest AP Portable (Final result)  Result time 02/14/23 18:26:19      Final result by Modesto Tao MD (02/14/23 18:26:19)                   Impression:      No acute findings.      Electronically signed by: Modesto Tao  Date:    02/14/2023  Time:    18:26               Narrative:    EXAMINATION:  XR CHEST AP PORTABLE    CLINICAL HISTORY:  vomiting;    TECHNIQUE:  Single frontal view of the chest was  performed.    COMPARISON:  10/11/2022    FINDINGS:  Chronic interstitial changes noted bilaterally.  No superimposed consolidation.  No obvious pleural fluid or pneumothorax.  Normal heart size.                                       Medications   iohexoL (OMNIPAQUE 350) injection 75 mL (has no administration in time range)   lactated ringers bolus 1,000 mL (1,000 mLs Intravenous New Bag 2/14/23 1751)     Medical Decision Making:   Clinical Tests:   Radiological Study: Reviewed  ED Management:  Cxr shows no acute cardiopulmonary abnormality    CT scan reviewed no signs of bowel obstruction.           ED Course as of 02/14/23 2205 Tue Feb 14, 2023   1710 Urinalysis, Reflex to Urine Culture Urine, Clean Catch [CD]   1820 CBC W/ AUTO DIFFERENTIAL(!)  Leukocytosis noted, [CD]   1820 Comp. Metabolic Panel(!)  Reviewed, hyperglycemia noted along with mild hyponatremia and hypochloremia most likely to multiple episodes of vomiting [CD]   1820 APTT(!)  Mildly elevated [CD]   1820 Lipase  Reviewed, within normal limits [CD]   1826 Protime-INR  Reviewed, within normal limits [CD]   2120 Spoke with [facility] who will admit pt to observation [CD]      ED Course User Index  [CD] Keith Man DO                   Clinical Impression:   Final diagnoses:  [R11.10] Vomiting  [K92.0] Coffee ground emesis (Primary)        ED Disposition Condition    Observation Stable                Keith Man DO  02/14/23 2205

## 2023-02-14 NOTE — ED NOTES
Bladder scanned performed. Pt refusing straight cath at this time. Pt requesting purewick. Purewick placed.

## 2023-02-14 NOTE — ED TRIAGE NOTES
Pt admitted to the ED via [facility] following an episode of coffee ground emesis at [facility]. At this time patient denies nausea. Pt was given 4mg of oral Zofran prior to leaving nursing home. Pt has colostomy and reports normal output. Pt denies eating or drinking today beside small sips with medications.

## 2023-02-15 ENCOUNTER — ANESTHESIA EVENT (OUTPATIENT)
Dept: ENDOSCOPY | Facility: HOSPITAL | Age: 88
End: 2023-02-15
Payer: MEDICARE

## 2023-02-15 ENCOUNTER — ANESTHESIA (OUTPATIENT)
Dept: ENDOSCOPY | Facility: HOSPITAL | Age: 88
End: 2023-02-15
Payer: MEDICARE

## 2023-02-15 PROBLEM — R11.10 INTRACTABLE VOMITING: Status: ACTIVE | Noted: 2023-02-15

## 2023-02-15 PROBLEM — K43.3 PARASTOMAL HERNIA WITH OBSTRUCTION AND WITHOUT GANGRENE: Status: ACTIVE | Noted: 2023-02-15

## 2023-02-15 PROBLEM — K92.0 HEMATEMESIS: Status: ACTIVE | Noted: 2023-02-15

## 2023-02-15 LAB
ABO + RH BLD: NORMAL
ANION GAP SERPL CALC-SCNC: 9 MMOL/L (ref 8–16)
BACTERIA UR CULT: NORMAL
BACTERIA UR CULT: NORMAL
BASOPHILS # BLD AUTO: 0.03 K/UL (ref 0–0.2)
BASOPHILS NFR BLD: 0.3 % (ref 0–1.9)
BLD GP AB SCN CELLS X3 SERPL QL: NORMAL
BUN SERPL-MCNC: 24 MG/DL (ref 8–23)
CALCIUM SERPL-MCNC: 9.6 MG/DL (ref 8.7–10.5)
CHLORIDE SERPL-SCNC: 96 MMOL/L (ref 95–110)
CO2 SERPL-SCNC: 31 MMOL/L (ref 23–29)
CREAT SERPL-MCNC: 1.3 MG/DL (ref 0.5–1.4)
DIFFERENTIAL METHOD: ABNORMAL
EOSINOPHIL # BLD AUTO: 0.1 K/UL (ref 0–0.5)
EOSINOPHIL NFR BLD: 0.5 % (ref 0–8)
ERYTHROCYTE [DISTWIDTH] IN BLOOD BY AUTOMATED COUNT: 13.2 % (ref 11.5–14.5)
EST. GFR  (NO RACE VARIABLE): 40 ML/MIN/1.73 M^2
GLUCOSE SERPL-MCNC: 98 MG/DL (ref 70–110)
HCT VFR BLD AUTO: 33.2 % (ref 37–48.5)
HGB BLD-MCNC: 11.2 G/DL (ref 12–16)
IMM GRANULOCYTES # BLD AUTO: 0.05 K/UL (ref 0–0.04)
IMM GRANULOCYTES NFR BLD AUTO: 0.5 % (ref 0–0.5)
LYMPHOCYTES # BLD AUTO: 2.4 K/UL (ref 1–4.8)
LYMPHOCYTES NFR BLD: 24 % (ref 18–48)
MCH RBC QN AUTO: 32.2 PG (ref 27–31)
MCHC RBC AUTO-ENTMCNC: 33.7 G/DL (ref 32–36)
MCV RBC AUTO: 95 FL (ref 82–98)
MONOCYTES # BLD AUTO: 1 K/UL (ref 0.3–1)
MONOCYTES NFR BLD: 10.4 % (ref 4–15)
NEUTROPHILS # BLD AUTO: 6.4 K/UL (ref 1.8–7.7)
NEUTROPHILS NFR BLD: 64.3 % (ref 38–73)
NRBC BLD-RTO: 0 /100 WBC
PLATELET # BLD AUTO: 187 K/UL (ref 150–450)
PMV BLD AUTO: 9.7 FL (ref 9.2–12.9)
POTASSIUM SERPL-SCNC: 4.3 MMOL/L (ref 3.5–5.1)
RBC # BLD AUTO: 3.48 M/UL (ref 4–5.4)
SODIUM SERPL-SCNC: 136 MMOL/L (ref 136–145)
WBC # BLD AUTO: 9.88 K/UL (ref 3.9–12.7)

## 2023-02-15 PROCEDURE — 37000008 HC ANESTHESIA 1ST 15 MINUTES: Performed by: INTERNAL MEDICINE

## 2023-02-15 PROCEDURE — 85025 COMPLETE CBC W/AUTO DIFF WBC: CPT | Performed by: NURSE PRACTITIONER

## 2023-02-15 PROCEDURE — D9220A PRA ANESTHESIA: ICD-10-PCS | Mod: ANES,,, | Performed by: ANESTHESIOLOGY

## 2023-02-15 PROCEDURE — 80048 BASIC METABOLIC PNL TOTAL CA: CPT | Performed by: NURSE PRACTITIONER

## 2023-02-15 PROCEDURE — 99222 1ST HOSP IP/OBS MODERATE 55: CPT | Mod: ,,, | Performed by: STUDENT IN AN ORGANIZED HEALTH CARE EDUCATION/TRAINING PROGRAM

## 2023-02-15 PROCEDURE — D9220A PRA ANESTHESIA: Mod: CRNA,,, | Performed by: NURSE ANESTHETIST, CERTIFIED REGISTERED

## 2023-02-15 PROCEDURE — 96375 TX/PRO/DX INJ NEW DRUG ADDON: CPT | Mod: 59

## 2023-02-15 PROCEDURE — 96376 TX/PRO/DX INJ SAME DRUG ADON: CPT | Mod: 59

## 2023-02-15 PROCEDURE — C9113 INJ PANTOPRAZOLE SODIUM, VIA: HCPCS | Performed by: NURSE PRACTITIONER

## 2023-02-15 PROCEDURE — 63600175 PHARM REV CODE 636 W HCPCS: Performed by: NURSE PRACTITIONER

## 2023-02-15 PROCEDURE — 25500020 PHARM REV CODE 255: Performed by: STUDENT IN AN ORGANIZED HEALTH CARE EDUCATION/TRAINING PROGRAM

## 2023-02-15 PROCEDURE — D9220A PRA ANESTHESIA: Mod: ANES,,, | Performed by: ANESTHESIOLOGY

## 2023-02-15 PROCEDURE — G0378 HOSPITAL OBSERVATION PER HR: HCPCS

## 2023-02-15 PROCEDURE — 63600175 PHARM REV CODE 636 W HCPCS

## 2023-02-15 PROCEDURE — 96365 THER/PROPH/DIAG IV INF INIT: CPT | Mod: 59

## 2023-02-15 PROCEDURE — 36415 COLL VENOUS BLD VENIPUNCTURE: CPT | Performed by: NURSE PRACTITIONER

## 2023-02-15 PROCEDURE — 27200997: Performed by: INTERNAL MEDICINE

## 2023-02-15 PROCEDURE — 43255 PR EGD, FLEX, W/CTRL BLEED, ANY METHOD: ICD-10-PCS | Mod: ,,, | Performed by: INTERNAL MEDICINE

## 2023-02-15 PROCEDURE — 43255 EGD CONTROL BLEEDING ANY: CPT | Performed by: INTERNAL MEDICINE

## 2023-02-15 PROCEDURE — 99223 PR INITIAL HOSPITAL CARE,LEVL III: ICD-10-PCS | Mod: 25,,, | Performed by: INTERNAL MEDICINE

## 2023-02-15 PROCEDURE — 63600175 PHARM REV CODE 636 W HCPCS: Performed by: NURSE ANESTHETIST, CERTIFIED REGISTERED

## 2023-02-15 PROCEDURE — 25000003 PHARM REV CODE 250: Performed by: NURSE ANESTHETIST, CERTIFIED REGISTERED

## 2023-02-15 PROCEDURE — 43255 EGD CONTROL BLEEDING ANY: CPT | Mod: ,,, | Performed by: INTERNAL MEDICINE

## 2023-02-15 PROCEDURE — 37000009 HC ANESTHESIA EA ADD 15 MINS: Performed by: INTERNAL MEDICINE

## 2023-02-15 PROCEDURE — 25000003 PHARM REV CODE 250: Performed by: NURSE PRACTITIONER

## 2023-02-15 PROCEDURE — 99223 1ST HOSP IP/OBS HIGH 75: CPT | Mod: 25,,, | Performed by: INTERNAL MEDICINE

## 2023-02-15 PROCEDURE — 99222 PR INITIAL HOSPITAL CARE,LEVL II: ICD-10-PCS | Mod: ,,, | Performed by: STUDENT IN AN ORGANIZED HEALTH CARE EDUCATION/TRAINING PROGRAM

## 2023-02-15 PROCEDURE — D9220A PRA ANESTHESIA: ICD-10-PCS | Mod: CRNA,,, | Performed by: NURSE ANESTHETIST, CERTIFIED REGISTERED

## 2023-02-15 RX ORDER — TALC
6 POWDER (GRAM) TOPICAL NIGHTLY PRN
Status: DISCONTINUED | OUTPATIENT
Start: 2023-02-15 | End: 2023-02-16 | Stop reason: HOSPADM

## 2023-02-15 RX ORDER — ONDANSETRON 2 MG/ML
4 INJECTION INTRAMUSCULAR; INTRAVENOUS EVERY 6 HOURS PRN
Status: DISCONTINUED | OUTPATIENT
Start: 2023-02-15 | End: 2023-02-16 | Stop reason: HOSPADM

## 2023-02-15 RX ORDER — PROCHLORPERAZINE EDISYLATE 5 MG/ML
2.5 INJECTION INTRAMUSCULAR; INTRAVENOUS EVERY 6 HOURS PRN
Status: DISCONTINUED | OUTPATIENT
Start: 2023-02-15 | End: 2023-02-16 | Stop reason: HOSPADM

## 2023-02-15 RX ORDER — PROPOFOL 10 MG/ML
VIAL (ML) INTRAVENOUS
Status: DISCONTINUED | OUTPATIENT
Start: 2023-02-15 | End: 2023-02-15

## 2023-02-15 RX ORDER — FLUCONAZOLE 100 MG/1
100 TABLET ORAL DAILY
Status: DISCONTINUED | OUTPATIENT
Start: 2023-02-15 | End: 2023-02-15

## 2023-02-15 RX ORDER — METOPROLOL TARTRATE 25 MG/1
25 TABLET, FILM COATED ORAL 2 TIMES DAILY
Status: DISCONTINUED | OUTPATIENT
Start: 2023-02-15 | End: 2023-02-16 | Stop reason: HOSPADM

## 2023-02-15 RX ORDER — FLUCONAZOLE 2 MG/ML
200 INJECTION, SOLUTION INTRAVENOUS
Status: DISCONTINUED | OUTPATIENT
Start: 2023-02-15 | End: 2023-02-16 | Stop reason: HOSPADM

## 2023-02-15 RX ORDER — LIDOCAINE HYDROCHLORIDE 20 MG/ML
INJECTION INTRAVENOUS
Status: DISCONTINUED | OUTPATIENT
Start: 2023-02-15 | End: 2023-02-15

## 2023-02-15 RX ORDER — ONDANSETRON 2 MG/ML
4 INJECTION INTRAMUSCULAR; INTRAVENOUS DAILY PRN
Status: CANCELLED | OUTPATIENT
Start: 2023-02-15

## 2023-02-15 RX ADMIN — POTASSIUM CHLORIDE 20 MEQ: 1500 TABLET, EXTENDED RELEASE ORAL at 09:02

## 2023-02-15 RX ADMIN — FLUCONAZOLE 200 MG: 2 INJECTION, SOLUTION INTRAVENOUS at 04:02

## 2023-02-15 RX ADMIN — FUROSEMIDE 20 MG: 20 TABLET ORAL at 09:02

## 2023-02-15 RX ADMIN — METOPROLOL TARTRATE 25 MG: 25 TABLET, FILM COATED ORAL at 09:02

## 2023-02-15 RX ADMIN — DIATRIZOATE MEGLUMINE AND DIATRIZOATE SODIUM 100 ML: 660; 100 LIQUID ORAL; RECTAL at 03:02

## 2023-02-15 RX ADMIN — PROPOFOL 20 MG: 10 INJECTION, EMULSION INTRAVENOUS at 11:02

## 2023-02-15 RX ADMIN — OXCARBAZEPINE 150 MG: 150 TABLET, FILM COATED ORAL at 12:02

## 2023-02-15 RX ADMIN — CHOLECALCIFEROL TAB 25 MCG (1000 UNIT) 1000 UNITS: 25 TAB at 09:02

## 2023-02-15 RX ADMIN — PANTOPRAZOLE SODIUM 40 MG: 40 INJECTION, POWDER, LYOPHILIZED, FOR SOLUTION INTRAVENOUS at 09:02

## 2023-02-15 RX ADMIN — PANTOPRAZOLE SODIUM 40 MG: 40 INJECTION, POWDER, LYOPHILIZED, FOR SOLUTION INTRAVENOUS at 12:02

## 2023-02-15 RX ADMIN — PROPOFOL 30 MG: 10 INJECTION, EMULSION INTRAVENOUS at 11:02

## 2023-02-15 RX ADMIN — PRAVASTATIN SODIUM 20 MG: 20 TABLET ORAL at 09:02

## 2023-02-15 RX ADMIN — PHENOBARBITAL 97.2 MG: 32.4 TABLET ORAL at 09:02

## 2023-02-15 RX ADMIN — LIDOCAINE HYDROCHLORIDE 50 MG: 20 INJECTION, SOLUTION INTRAVENOUS at 11:02

## 2023-02-15 RX ADMIN — PHENOBARBITAL 97.2 MG: 32.4 TABLET ORAL at 12:02

## 2023-02-15 RX ADMIN — OXCARBAZEPINE 150 MG: 150 TABLET, FILM COATED ORAL at 09:02

## 2023-02-15 NOTE — PROGRESS NOTES
[facility] Medicine  Progress Note    Patient Name: Annette Garcia  MRN: [id number]  Patient Class: OP- Observation   Admission Date: 2/14/2023  Length of Stay: 0 days  Attending Physician: Tyler Rocha, *  Primary Care Provider: Jose Valles MD        Subjective:     Principal Problem:Intractable vomiting        HPI:  Annette Garcia is an 87 year old female w/ PMH of HTN, seizure disorder, afib on eliquis, LLE DVD, May-Thurner Syndrome, and osteoporosis who presents from [facility] with concerns for coffee-ground emesis.  Patient has been vomiting since yesterday and apparently was clear yesterday however she states she is vomited about 30-40 times today and had coffee-ground emesis on her jacket that was removed on arrival.  She currently states that she has no nausea and is tolerating ice chips but refusing anything else at this time.  She denies any abdominal pain or tenderness to palpation.  She does have a history of colostomy and states that it was changed yesterday but she has had stool in her colostomy per her normal. Ostomy site is pink and moist w/o irritation or distention.  She denies any fevers/chills.  She denies any dysuria/hematuria dysuria/urinary frequency. UA positive for leukocytes and WBC.    Hospital medicine will admit for intractable N/V and GIB w/u and consult GI.      Overview/Hospital Course:  No notes on file    Interval History: Patient resting quietly, easily aroused. Denies further N/V. Denies abdominal pain and diarrhea. Underwent endoscopy with GI, found to have candidiasis esophagitis, hiatal hernia, erosive gastropathy with clip placed and polyp without resection attempt. Initiated on diflucan IV due to pt's red dye allergy within oral formulation. Protonix x 8 weeks also recommended along with repeat upper endoscopy. Monitor H&H. General surgery suspects hiatal hernia may be the cause of pt's problem and ordered gastrografin.  Appreciate recommendations.     Review of Systems   Constitutional:  Negative for chills and fever.   HENT:  Positive for hearing loss. Negative for sore throat.    Eyes:  Negative for visual disturbance.   Respiratory:  Negative for cough and shortness of breath.    Cardiovascular:  Negative for chest pain and leg swelling.   Gastrointestinal:  Positive for nausea and vomiting. Negative for abdominal pain and diarrhea.   Genitourinary:  Negative for dysuria.   Musculoskeletal:  Negative for myalgias.   Skin:  Negative for rash and wound.   Neurological:  Negative for syncope, weakness and headaches.   Objective:     Vital Signs (Most Recent):  Temp: 96.6 °F (35.9 °C) (02/15/23 1255)  Pulse: 70 (02/15/23 1255)  Resp: 18 (02/15/23 1255)  BP: (!) 140/61 (02/15/23 1255)  SpO2: 98 % (02/15/23 1255)   Vital Signs (24h Range):  Temp:  [96.6 °F (35.9 °C)-99.1 °F (37.3 °C)] 96.6 °F (35.9 °C)  Pulse:  [] 70  Resp:  [16-20] 18  SpO2:  [88 %-100 %] 98 %  BP: (114-151)/(38-88) 140/61     Weight: 68.2 kg (150 lb 5.7 oz)  Body mass index is 32.54 kg/m².    Intake/Output Summary (Last 24 hours) at 2/15/2023 1438  Last data filed at 2/15/2023 0654  Gross per 24 hour   Intake 110 ml   Output 500 ml   Net -390 ml      Physical Exam  Vitals reviewed.   Constitutional:       Appearance: She is normal weight.   HENT:      Head: Normocephalic.   Cardiovascular:      Rate and Rhythm: Normal rate and regular rhythm.      Pulses: Normal pulses.      Heart sounds: Normal heart sounds.   Pulmonary:      Effort: Pulmonary effort is normal.   Abdominal:      General: Abdomen is flat. Bowel sounds are normal.      Palpations: Abdomen is soft.      Comments: Colostomy to LLQ    Skin:     General: Skin is warm and dry.   Neurological:      General: No focal deficit present.      Mental Status: She is alert and oriented to person, place, and time. Mental status is at baseline.       Significant Labs: All pertinent labs within the past 24  hours have been reviewed.    Significant Imaging: I have reviewed all pertinent imaging results/findings within the past 24 hours.  I have reviewed and interpreted all pertinent imaging results/findings within the past 24 hours.      Assessment/Plan:      * Intractable vomiting  · Per patient, she had 30-40 episodes of vomiting PTA  · Denies constipation or diarrhea  · Abd CT:   · No convincing significant acute bowel obstruction, transmural inflammation or other acute abnormality involving the GI tract in this patient with vomiting.  There are few loops of borderline caliber small bowel in the mid abdomen at this time without a transition point and this finding is nonspecific.  · Postoperative changes of left lower quadrant colostomy with stable parastomal hernia containing loops of small bowel as described.  No concerning obstructive changes/incarceration seen.  · Again noted is an enterocolic anastomosis in the midline lower abdomen and Sohail's pouch.  · Postoperative changes of cholecystectomy, hysterectomy and left iliac vein stenting as described.  · No convincing acute focal visceral lesions.  · Hiatal hernia.  · Thoracolumbar remote compression fractures, osteopenia as well as spondylosis with lumbar stenoses as above.  · Tolerating ice chips w/o further N/V - denies abd pain  2/15- Underwent endoscopy per GI- candidiasis esophagitis, hiatal hernia, erosive gastropathy with clip placed and polyp without resection attempt.   -Initiated on diflucan IV due to pt's red dye allergy, formulation could be switched to suspension upon discharge  -Protonix x 8 weeks and repeat upper endoscopy for evaluation  -General surgery suspects hiatal hernia may be culprit, gastrografin ordered.       Hematemesis  · POA  · Per patient and ED there was coffee-ground emesis on her shirt on admission  · ASA, eliquis, and antihypertensives o/h at this time  · Protonix BID   · Monitor H/H  · GI consult ordered for the  AM      Leukocytosis  · WBC 12.95  · Afebrile  · UA positive for leukocytes and WBC  · Patient denies symptoms of UTI  · Recheck CBC w/ AM labs to trend WBC  · Abx currently o/h at this time given asymptomatic bacteruria  · May start if symptoms develop or WBC increases      Colostomy present  · Stoma site clean, moist, pink, and w/o signs of infection or irritation  · No distention or tenderness around site      Coronary artery disease  · Continue home statin  · ASA o/h in the setting of suspected GIB      Atrial fibrillation  Patient with Permanent atrial fibrillation which is controlled currently with Beta Blocker. Patient is currently in atrial fibrillation.[id number] Score: 4. HASBLED Score: 4. Anticoagulation not indicated due to suspected GIB.          Peripheral vascular disease, unspecified  · Continue statin  · ASA o/h in the setting of suspected GIB      Hypertension  · Continue lasix  · Other BP meds o/h in the setting of suspected GIB      Hypothyroidism  · Takes levothyroxine at home but the levothyroxine we have in-house has red dye in it, which patient has a severe allergy to        Seizure disorder  · Continue home phenobarbital and oxcarbazepine  · Maintain safety precautions        VTE Risk Mitigation (From admission, onward)         Ordered     IP VTE HIGH RISK PATIENT  Once         02/14/23 2350     Place sequential compression device  Until discontinued         02/14/23 2350                Discharge Planning   JESSICA:      Code Status: Full Code   Is the patient medically ready for discharge?:     Reason for patient still in hospital (select all that apply): Treatment and Consult recommendations  Discharge Plan A: Return to nursing home   Discharge Delays: None known at this time              Esperanza Cat NP  Department of Hospital Medicine   [facility]

## 2023-02-15 NOTE — PLAN OF CARE
ISRAEL noted pt to be from [facility].   ISRAEL sent update via [employer].        02/15/23 1131   Post-Acute Status   Post-Acute Authorization Placement   Post-Acute Placement Status Set-up Complete/Auth obtained   Discharge Delays None known at this time   Discharge Plan   Discharge Plan A Return to nursing home     No future appointments.    ZARIA Padron Case Management  [phone]

## 2023-02-15 NOTE — SUBJECTIVE & OBJECTIVE
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       Past Surgical History:   Procedure Laterality Date    APPENDECTOMY      BACK SURGERY  1988    vertebral fracture    BACK SURGERY  02/2013    lumbar L2-5    CATARACT EXTRACTION, BILATERAL Bilateral     CHOLECYSTECTOMY      open    EYE SURGERY Bilateral     cataract removal with lens implant    HYSTERECTOMY      PERCUTANEOUS TRANSLUMINAL ANGIOPLASTY (PTA) OF PERIPHERAL VESSEL N/A 2/3/2020    Procedure: PTA, PERIPHERAL VESSEL;  Surgeon: Timmy Simmons MD;  Location: [facility] CATH LAB/EP;  Service: Cardiology;  Laterality: N/A;    PORTACATH PLACEMENT Right 09/2016    RENAL ARTERY STENT Left 07/19/2017    SIGMOIDECTOMY  10/29/2019    SMALL INTESTINE SURGERY  08/23/2016    THROMBECTOMY N/A 2/3/2020    Procedure: THROMBECTOMY;  Surgeon: Timmy Simmons MD;  Location: [facility] CATH LAB/EP;  Service: Cardiology;  Laterality: N/A;    TONSILLECTOMY      VAGINAL HYSTERECTOMY W/ ANTERIOR AND POSTERIOR VAGINAL REPAIR         Review of patient's allergies indicates:   Allergen Reactions    Adhesive Itching and Blisters    Penicillins Anaphylaxis    Tramadol Hives    Avelox [moxifloxacin] Rash     Facial and arm itching and redness. Pt states throat closes when given.    Amoxil [amoxicillin]     Aspridrox [aspirin, buffered]     Codeine Other (See Comments)     Throat swelling    Keflex [cephalexin]      Tolerated cefepime and cefazolin    Norvasc [amlodipine]     Red dye Hives    Robitussin [guaifenesin]      Sulfa (sulfonamide antibiotics)     Tylenol [acetaminophen]      Has reaction to Tylenol with red dye and unable to take Extra Strength Tylenol/ CAN ONLY TOLERATE REG STRENGTH TYLENOL    Vicks vaporub [camphor-eucalyptus oil-menthol]        No current facility-administered medications on file prior to encounter.     Current Outpatient Medications on File Prior to Encounter   Medication Sig    acetaminophen (TYLENOL) 325 MG tablet Take 2 tablets (650 mg total) by mouth every 4 (four) hours as needed for Pain.    apixaban (ELIQUIS) 2.5 mg Tab Take 1 tablet (2.5 mg total) by mouth 2 (two) times daily.    calcium-vitamin D 600 mg(1,500mg) -400 unit Tab Take 1 tablet by mouth once daily.    furosemide (LASIX) 20 MG tablet Take 20 mg by mouth once daily.    levothyroxine (SYNTHROID) 125 MCG tablet TAKE 1 TABLET (125 MCG TOTAL) BY MOUTH ONCE DAILY.    losartan (COZAAR) 50 MG tablet Take 0.5 tablets (25 mg total) by mouth once daily.    magnesium oxide (MAG-OX) 400 mg (241.3 mg magnesium) tablet Take 2 tablets (800 mg total) by mouth once daily.    metoprolol tartrate (LOPRESSOR) 25 MG tablet Take 1 tablet (25 mg total) by mouth 2 (two) times daily.    OXcarbazepine (TRILEPTAL) 150 MG Tab Take 150 mg by mouth nightly.    PHENobarbitaL 97.2 MG tablet Take 1 tablet (97.2 mg total) by mouth once daily at 6am. (Patient taking differently: Take 97.2 mg by mouth every evening.)    polyethylene glycol (GLYCOLAX) 17 gram PwPk Take 17 g by mouth once daily.    potassium chloride SA (K-DUR,KLOR-CON) 20 MEQ tablet Take 20 mEq by mouth once daily.    pravastatin (PRAVACHOL) 20 MG tablet Take 20 mg by mouth once daily.    vitamin D (VITAMIN D3) 1000 units Tab Take 1,000 Units by mouth once daily.    [DISCONTINUED] guaiFENesin (MUCINEX) 600 mg 12 hr tablet Take 600 mg by mouth 2 (two) times daily. For 10 days    [DISCONTINUED] miconazole NITRATE 2 % (MICOTIN) 2 % top powder Apply topically 2 (two) times daily. Apply to bilateral under  breast    [DISCONTINUED] ondansetron (ZOFRAN) 8 MG tablet Take 8 mg by mouth every 6 (six) hours as needed for Nausea (and vomiting).    [DISCONTINUED] phenoL (CHLORASEPTIC) 0.5 % SprA by Mucous Membrane route 2 (two) times daily as needed (sore throat).     Family History       Problem Relation (Age of Onset)    Heart attack Father    Hypertension Father    Pancreatic cancer Mother          Tobacco Use    Smoking status: Never    Smokeless tobacco: Never   Substance and Sexual Activity    Alcohol use: No    Drug use: No    Sexual activity: Not on file     Review of Systems   Constitutional:  Negative for chills, fatigue, fever and unexpected weight change.   HENT:  Negative for trouble swallowing.    Respiratory:  Negative for shortness of breath.    Cardiovascular:  Negative for chest pain and leg swelling.   Gastrointestinal:  Positive for abdominal pain, nausea and vomiting. Negative for abdominal distention.   Genitourinary:  Negative for difficulty urinating, dysuria, flank pain, frequency, hematuria and urgency.   Skin:  Negative for color change.   Neurological:  Negative for dizziness, weakness, light-headedness and headaches.   Psychiatric/Behavioral:  Negative for agitation and confusion. The patient is not nervous/anxious.    Objective:     Vital Signs (Most Recent):  Temp: 97.3 °F (36.3 °C) (02/14/23 2348)  Pulse: 83 (02/14/23 2348)  Resp: 18 (02/14/23 2348)  BP: 135/60 (02/14/23 2348)  SpO2: 98 % (02/14/23 2348) Vital Signs (24h Range):  Temp:  [96.9 °F (36.1 °C)-99.1 °F (37.3 °C)] 97.3 °F (36.3 °C)  Pulse:  [81-94] 83  Resp:  [16-20] 18  SpO2:  [88 %-100 %] 98 %  BP: (115-135)/(58-71) 135/60     Weight: 68.2 kg (150 lb 5.7 oz)  Body mass index is 32.54 kg/m².    Physical Exam  Vitals and nursing note reviewed.   Constitutional:       Appearance: Normal appearance.   HENT:      Head: Normocephalic and atraumatic.   Cardiovascular:      Rate and Rhythm: Normal rate. Rhythm irregular.      Pulses: Normal  pulses.      Heart sounds: Normal heart sounds.   Pulmonary:      Effort: Pulmonary effort is normal.      Breath sounds: Normal breath sounds.   Abdominal:      General: Bowel sounds are normal. There is no distension.      Palpations: Abdomen is soft. There is no mass.      Tenderness: There is no abdominal tenderness. There is no guarding.   Musculoskeletal:         General: No tenderness.      Right lower le+ Edema present.      Left lower le+ Edema present.   Neurological:      Mental Status: She is alert and oriented to person, place, and time.   Psychiatric:         Mood and Affect: Mood normal.         Behavior: Behavior normal.         Thought Content: Thought content normal.         Judgment: Judgment normal.           Significant Labs: All pertinent labs within the past 24 hours have been reviewed.    Significant Imaging: I have reviewed all pertinent imaging results/findings within the past 24 hours.

## 2023-02-15 NOTE — HPI
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  In the ED patient was found to have drop in hemoglobin.  Out of concern for GI bleed patient was admitted hospital medicine GI was consulted.

## 2023-02-15 NOTE — SUBJECTIVE & OBJECTIVE
Interval History: Patient resting quietly, easily aroused. Denies further N/V. Denies abdominal pain and diarrhea. Underwent endoscopy with GI, found to have candidiasis esophagitis, hiatal hernia, erosive gastropathy with clip placed and polyp without resection attempt. Initiated on diflucan IV due to pt's red dye allergy within oral formulation. Protonix x 8 weeks also recommended along with repeat upper endoscopy. Monitor H&H. General surgery suspects hiatal hernia may be the cause of pt's problem and ordered gastrografin. Appreciate recommendations.     Review of Systems   Constitutional:  Negative for chills and fever.   HENT:  Positive for hearing loss. Negative for sore throat.    Eyes:  Negative for visual disturbance.   Respiratory:  Negative for cough and shortness of breath.    Cardiovascular:  Negative for chest pain and leg swelling.   Gastrointestinal:  Positive for nausea and vomiting. Negative for abdominal pain and diarrhea.   Genitourinary:  Negative for dysuria.   Musculoskeletal:  Negative for myalgias.   Skin:  Negative for rash and wound.   Neurological:  Negative for syncope, weakness and headaches.   Objective:     Vital Signs (Most Recent):  Temp: 96.6 °F (35.9 °C) (02/15/23 1255)  Pulse: 70 (02/15/23 1255)  Resp: 18 (02/15/23 1255)  BP: (!) 140/61 (02/15/23 1255)  SpO2: 98 % (02/15/23 1255)   Vital Signs (24h Range):  Temp:  [96.6 °F (35.9 °C)-99.1 °F (37.3 °C)] 96.6 °F (35.9 °C)  Pulse:  [] 70  Resp:  [16-20] 18  SpO2:  [88 %-100 %] 98 %  BP: (114-151)/(38-88) 140/61     Weight: 68.2 kg (150 lb 5.7 oz)  Body mass index is 32.54 kg/m².    Intake/Output Summary (Last 24 hours) at 2/15/2023 1438  Last data filed at 2/15/2023 0654  Gross per 24 hour   Intake 110 ml   Output 500 ml   Net -390 ml      Physical Exam  Vitals reviewed.   Constitutional:       Appearance: She is normal weight.   HENT:      Head: Normocephalic.   Cardiovascular:      Rate and Rhythm: Normal rate and regular  rhythm.      Pulses: Normal pulses.      Heart sounds: Normal heart sounds.   Pulmonary:      Effort: Pulmonary effort is normal.   Abdominal:      General: Abdomen is flat. Bowel sounds are normal.      Palpations: Abdomen is soft.      Comments: Colostomy to LLQ    Skin:     General: Skin is warm and dry.   Neurological:      General: No focal deficit present.      Mental Status: She is alert and oriented to person, place, and time. Mental status is at baseline.       Significant Labs: All pertinent labs within the past 24 hours have been reviewed.    Significant Imaging: I have reviewed all pertinent imaging results/findings within the past 24 hours.  I have reviewed and interpreted all pertinent imaging results/findings within the past 24 hours.   Quality 110: Preventive Care And Screening: Influenza Immunization: Influenza Immunization Administered during Influenza season Quality 226: Preventive Care And Screening: Tobacco Use: Screening And Cessation Intervention: Patient screened for tobacco use and is an ex/non-smoker Detail Level: Detailed

## 2023-02-15 NOTE — PROVATION PATIENT INSTRUCTIONS
Discharge Summary/Instructions after an Endoscopic Procedure  Patient Name: Annette Garcia  Patient MRN: [id number]  Patient YOB: 1935  Wednesday, February 15, 2023  Keith Chavarria MD  Dear patient,  As a result of recent federal legislation (The Federal Cures Act), you may   receive lab or pathology results from your procedure in your [facility]   account before your physician is able to contact you. Your physician or   their representative will relay the results to you with their   recommendations at their soonest availability.  Thank you,  Your health is very important to us during the Covid Crisis. Following your   procedure today, you will receive a daily text for 2 weeks asking about   signs or symptoms of Covid 19.  Please respond to this text when you   receive it so we can follow up and keep you as safe as possible.   RESTRICTIONS:  During your procedure today, you received medications for sedation.  These   medications may affect your judgment, balance and coordination.  Therefore,   for 24 hours, you have the following restrictions:   - DO NOT drive a car, operate machinery, make legal/financial decisions,   sign important papers or drink alcohol.    ACTIVITY:  Today: no heavy lifting, straining or running due to procedural   sedation/anesthesia.  The following day: return to full activity including work.  DIET:  Eat and drink normally unless instructed otherwise.     TREATMENT FOR COMMON SIDE EFFECTS:  - Mild abdominal pain, nausea, belching, bloating or excessive gas:  rest,   eat lightly and use a heating pad.  - Sore Throat: treat with throat lozenges and/or gargle with warm salt   water.  - Because air was used during the procedure, expelling large amounts of air   from your rectum or belching is normal.  - If a bowel prep was taken, you may not have a bowel movement for 1-3 days.    This is normal.  SYMPTOMS TO WATCH FOR AND REPORT TO YOUR PHYSICIAN:  1. Abdominal pain or  bloating, other than gas cramps.  2. Chest pain.  3. Back pain.  4. Signs of infection such as: chills or fever occurring within 24 hours   after the procedure.  5. Rectal bleeding, which would show as bright red, maroon, or black stools.   (A tablespoon of blood from the rectum is not serious, especially if   hemorrhoids are present.)  6. Vomiting.  7. Weakness or dizziness.  GO DIRECTLY TO THE NEAREST EMERGENCY ROOM IF YOU HAVE ANY OF THE FOLLOWING:      Difficulty breathing              Chills and/or fever over 101 F   Persistent vomiting and/or vomiting blood   Severe abdominal pain   Severe chest pain   Black, tarry stools   Bleeding- more than one tablespoon   Any other symptom or condition that you feel may need urgent attention  Your doctor recommends these additional instructions:  If any biopsies were taken, your doctors clinic will contact you in 1 to 2   weeks with any results.  - Return patient to [facility] for ongoing care.   - Resume previous diet.   - Continue present medications.   - Diflucan (fluconazole) 100 mg PO daily for 2 weeks.   - Use Protonix (pantoprazole) 40 mg PO BID for 8 weeks.   - Repeat upper endoscopy in 8 weeks to check healing.  For questions, problems or results please call your physician - Keith Chavarria MD.  EMERGENCY PHONE NUMBER: [phone],  LAB RESULTS: [phone]  IF A COMPLICATION OR EMERGENCY SITUATION ARISES AND YOU ARE UNABLE TO REACH   YOUR PHYSICIAN - GO DIRECTLY TO THE EMERGENCY ROOM.  Keith Chavarria MD  2/15/2023 12:01:00 PM  This report has been verified and signed electronically.  Dear patient,  As a result of recent federal legislation (The Federal Cures Act), you may   receive lab or pathology results from your procedure in your [facility]   account before your physician is able to contact you. Your physician or   their representative will relay the results to you with their   recommendations at their soonest  availability.  Thank you,  PROVATION

## 2023-02-15 NOTE — H&P
"[facility] Medicine  History & Physical    Patient Name: Annette Garcia  MRN: [id number]  Patient Class: OP- Observation  Admission Date: 2/14/2023  Attending Physician: Tyler Rocha, *   Primary Care Provider: Jose Valles MD     "As clarification, on 2/15/2023, patient should be placed in hospital observation services under my care in collaboration with Kaylah Izaguirre MD. Signed by Cata Alonzo NP."      Patient information was obtained from patient and ER records.     Subjective:     Principal Problem:Intractable vomiting    Chief Complaint:   Chief Complaint   Patient presents with    Vomiting     C/o N/V since yesterday with coffee ground emesis reported today. Presents awake, alert, oriented. Denies pain. States nausea has subsided and last emesis about 1 hour ago.         HPI: Annette Garcia is an 87 year old female w/ PMH of HTN, seizure disorder, afib on eliquis, LLE DVD, May-Thurner Syndrome, and osteoporosis who presents from [facility] with concerns for coffee-ground emesis.  Patient has been vomiting since yesterday and apparently was clear yesterday however she states she is vomited about 30-40 times today and had coffee-ground emesis on her jacket that was removed on arrival.  She currently states that she has no nausea and is tolerating ice chips but refusing anything else at this time.  She denies any abdominal pain or tenderness to palpation.  She does have a history of colostomy and states that it was changed yesterday but she has had stool in her colostomy per her normal. Ostomy site is pink and moist w/o irritation or distention.  She denies any fevers/chills.  She denies any dysuria/hematuria dysuria/urinary frequency. UA positive for leukocytes and WBC.    Hospital medicine will admit for intractable N/V and GIB w/u and consult GI.      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       Past Surgical History:   Procedure Laterality Date    APPENDECTOMY      BACK SURGERY  1988    vertebral fracture    BACK SURGERY  02/2013    lumbar L2-5    CATARACT EXTRACTION, BILATERAL Bilateral     CHOLECYSTECTOMY      open    EYE SURGERY Bilateral     cataract removal with lens implant    HYSTERECTOMY      PERCUTANEOUS TRANSLUMINAL ANGIOPLASTY (PTA) OF PERIPHERAL VESSEL N/A 2/3/2020    Procedure: PTA, PERIPHERAL VESSEL;  Surgeon: Timmy Simmons MD;  Location: [facility] CATH LAB/EP;  Service: Cardiology;  Laterality: N/A;    PORTACATH PLACEMENT Right 09/2016    RENAL ARTERY STENT Left 07/19/2017    SIGMOIDECTOMY  10/29/2019    SMALL INTESTINE SURGERY  08/23/2016    THROMBECTOMY N/A 2/3/2020    Procedure: THROMBECTOMY;  Surgeon: Timmy Simmons MD;  Location: [facility] CATH LAB/EP;  Service: Cardiology;  Laterality: N/A;    TONSILLECTOMY      VAGINAL HYSTERECTOMY W/ ANTERIOR AND POSTERIOR VAGINAL REPAIR         Review of patient's allergies indicates:   Allergen Reactions    Adhesive Itching and Blisters    Penicillins Anaphylaxis    Tramadol Hives    Avelox [moxifloxacin] Rash     Facial and arm itching and redness. Pt states throat closes when given.    Amoxil [amoxicillin]     Aspridrox [aspirin, buffered]     Codeine Other (See Comments)     Throat swelling    Keflex [cephalexin]      Tolerated cefepime and cefazolin    Norvasc [amlodipine]     Red dye Hives    Robitussin [guaifenesin]     Sulfa (sulfonamide antibiotics)     Tylenol [acetaminophen]      Has reaction to Tylenol with red dye and  unable to take Extra Strength Tylenol/ CAN ONLY TOLERATE REG STRENGTH TYLENOL    Vicks vaporub [camphor-eucalyptus oil-menthol]        No current facility-administered medications on file prior to encounter.     Current Outpatient Medications on File Prior to Encounter   Medication Sig    acetaminophen (TYLENOL) 325 MG tablet Take 2 tablets (650 mg total) by mouth every 4 (four) hours as needed for Pain.    apixaban (ELIQUIS) 2.5 mg Tab Take 1 tablet (2.5 mg total) by mouth 2 (two) times daily.    calcium-vitamin D 600 mg(1,500mg) -400 unit Tab Take 1 tablet by mouth once daily.    furosemide (LASIX) 20 MG tablet Take 20 mg by mouth once daily.    levothyroxine (SYNTHROID) 125 MCG tablet TAKE 1 TABLET (125 MCG TOTAL) BY MOUTH ONCE DAILY.    losartan (COZAAR) 50 MG tablet Take 0.5 tablets (25 mg total) by mouth once daily.    magnesium oxide (MAG-OX) 400 mg (241.3 mg magnesium) tablet Take 2 tablets (800 mg total) by mouth once daily.    metoprolol tartrate (LOPRESSOR) 25 MG tablet Take 1 tablet (25 mg total) by mouth 2 (two) times daily.    OXcarbazepine (TRILEPTAL) 150 MG Tab Take 150 mg by mouth nightly.    PHENobarbitaL 97.2 MG tablet Take 1 tablet (97.2 mg total) by mouth once daily at 6am. (Patient taking differently: Take 97.2 mg by mouth every evening.)    polyethylene glycol (GLYCOLAX) 17 gram PwPk Take 17 g by mouth once daily.    potassium chloride SA (K-DUR,KLOR-CON) 20 MEQ tablet Take 20 mEq by mouth once daily.    pravastatin (PRAVACHOL) 20 MG tablet Take 20 mg by mouth once daily.    vitamin D (VITAMIN D3) 1000 units Tab Take 1,000 Units by mouth once daily.    [DISCONTINUED] guaiFENesin (MUCINEX) 600 mg 12 hr tablet Take 600 mg by mouth 2 (two) times daily. For 10 days    [DISCONTINUED] miconazole NITRATE 2 % (MICOTIN) 2 % top powder Apply topically 2 (two) times daily. Apply to bilateral under breast    [DISCONTINUED] ondansetron (ZOFRAN) 8 MG tablet Take 8 mg by mouth every 6 (six) hours as needed  for Nausea (and vomiting).    [DISCONTINUED] phenoL (CHLORASEPTIC) 0.5 % SprA by Mucous Membrane route 2 (two) times daily as needed (sore throat).     Family History       Problem Relation (Age of Onset)    Heart attack Father    Hypertension Father    Pancreatic cancer Mother          Tobacco Use    Smoking status: Never    Smokeless tobacco: Never   Substance and Sexual Activity    Alcohol use: No    Drug use: No    Sexual activity: Not on file     Review of Systems   Constitutional:  Negative for chills, fatigue, fever and unexpected weight change.   HENT:  Negative for trouble swallowing.    Respiratory:  Negative for shortness of breath.    Cardiovascular:  Negative for chest pain and leg swelling.   Gastrointestinal:  Positive for abdominal pain, nausea and vomiting. Negative for abdominal distention.   Genitourinary:  Negative for difficulty urinating, dysuria, flank pain, frequency, hematuria and urgency.   Skin:  Negative for color change.   Neurological:  Negative for dizziness, weakness, light-headedness and headaches.   Psychiatric/Behavioral:  Negative for agitation and confusion. The patient is not nervous/anxious.    Objective:     Vital Signs (Most Recent):  Temp: 97.3 °F (36.3 °C) (02/14/23 2348)  Pulse: 83 (02/14/23 2348)  Resp: 18 (02/14/23 2348)  BP: 135/60 (02/14/23 2348)  SpO2: 98 % (02/14/23 2348) Vital Signs (24h Range):  Temp:  [96.9 °F (36.1 °C)-99.1 °F (37.3 °C)] 97.3 °F (36.3 °C)  Pulse:  [81-94] 83  Resp:  [16-20] 18  SpO2:  [88 %-100 %] 98 %  BP: (115-135)/(58-71) 135/60     Weight: 68.2 kg (150 lb 5.7 oz)  Body mass index is 32.54 kg/m².    Physical Exam  Vitals and nursing note reviewed.   Constitutional:       Appearance: Normal appearance.   HENT:      Head: Normocephalic and atraumatic.   Cardiovascular:      Rate and Rhythm: Normal rate. Rhythm irregular.      Pulses: Normal pulses.      Heart sounds: Normal heart sounds.   Pulmonary:      Effort: Pulmonary effort is normal.       Breath sounds: Normal breath sounds.   Abdominal:      General: Bowel sounds are normal. There is no distension.      Palpations: Abdomen is soft. There is no mass.      Tenderness: There is no abdominal tenderness. There is no guarding.   Musculoskeletal:         General: No tenderness.      Right lower le+ Edema present.      Left lower le+ Edema present.   Neurological:      Mental Status: She is alert and oriented to person, place, and time.   Psychiatric:         Mood and Affect: Mood normal.         Behavior: Behavior normal.         Thought Content: Thought content normal.         Judgment: Judgment normal.           Significant Labs: All pertinent labs within the past 24 hours have been reviewed.    Significant Imaging: I have reviewed all pertinent imaging results/findings within the past 24 hours.    Assessment/Plan:     * Intractable vomiting  Per patient, she had 30-40 episodes of vomiting PTA  Denies constipation or diarrhea  Abd CT:   No convincing significant acute bowel obstruction, transmural inflammation or other acute abnormality involving the GI tract in this patient with vomiting.  There are few loops of borderline caliber small bowel in the mid abdomen at this time without a transition point and this finding is nonspecific.  Postoperative changes of left lower quadrant colostomy with stable parastomal hernia containing loops of small bowel as described.  No concerning obstructive changes/incarceration seen.  Again noted is an enterocolic anastomosis in the midline lower abdomen and Sohail's pouch.  Postoperative changes of cholecystectomy, hysterectomy and left iliac vein stenting as described.  No convincing acute focal visceral lesions.  Hiatal hernia.  Thoracolumbar remote compression fractures, osteopenia as well as spondylosis with lumbar stenoses as above.  Tolerating ice chips w/o further N/V - denies abd pain      Hematemesis  POA  Per patient and ED there was coffee-ground  emesis on her shirt on admission  ASA, eliquis, and antihypertensives o/h at this time  Protonix BID   Monitor H/H  GI consult ordered for the AM      Leukocytosis  WBC 12.95  Afebrile  UA positive for leukocytes and WBC  Patient denies symptoms of UTI  Recheck CBC w/ AM labs to trend WBC  Abx currently o/h at this time given asymptomatic bacteruria  May start if symptoms develop or WBC increases      Colostomy present  Stoma site clean, moist, pink, and w/o signs of infection or irritation  No distention or tenderness around site      Coronary artery disease  Continue home statin  ASA o/h in the setting of suspected GIB      Atrial fibrillation  Patient with Permanent atrial fibrillation which is controlled currently with Beta Blocker. Patient is currently in atrial fibrillation.[id number] Score: 4. HASBLED Score: 4. Anticoagulation not indicated due to suspected GIB.          Peripheral vascular disease, unspecified  Continue statin  ASA o/h in the setting of suspected GIB      Hypertension  Continue lasix  Other BP meds o/h in the setting of suspected GIB      Hypothyroidism  Takes levothyroxine at home but the levothyroxine we have in-house has red dye in it, which patient has a severe allergy to        Seizure disorder  Continue home phenobarbital and oxcarbazepine  Maintain safety precautions        VTE Risk Mitigation (From admission, onward)           Ordered     IP VTE HIGH RISK PATIENT  Once         02/14/23 2350     Place sequential compression device  Until discontinued         02/14/23 2350                       Cata Alonzo NP  Department of Hospital Medicine   [place] [facility]

## 2023-02-15 NOTE — PLAN OF CARE
Problem: Adult Inpatient Plan of Care  Goal: Plan of Care Review  Outcome: Ongoing, Progressing  Goal: Patient-Specific Goal (Individualized)  Outcome: Ongoing, Progressing  Goal: Absence of Hospital-Acquired Illness or Injury  Outcome: Ongoing, Progressing  Goal: Optimal Comfort and Wellbeing  Outcome: Ongoing, Progressing  Goal: Readiness for Transition of Care  Outcome: Ongoing, Progressing     Problem: Bleeding (Gastrointestinal Bleeding)  Goal: Hemostasis  Outcome: Ongoing, Progressing     Problem: Fall Injury Risk  Goal: Absence of Fall and Fall-Related Injury  Outcome: Ongoing, Progressing     Problem: Skin Injury Risk Increased  Goal: Skin Health and Integrity  Outcome: Ongoing, Progressing     No acute events overnight. Pt denies nausea/vomiting, tolerated medication, ice chips and water fine. No output in colostomy bag.

## 2023-02-15 NOTE — ASSESSMENT & PLAN NOTE
· Stoma site clean, moist, pink, and w/o signs of infection or irritation  · No distention or tenderness around site

## 2023-02-15 NOTE — HPI
Annette Garcia is an 87 year old female w/ PMH of HTN, seizure disorder, afib on eliquis, LLE DVD, May-Thurner Syndrome, and osteoporosis who presents from [facility] with concerns for coffee-ground emesis.  Patient has been vomiting since yesterday and apparently was clear yesterday however she states she is vomited about 30-40 times today and had coffee-ground emesis on her jacket that was removed on arrival.  She currently states that she has no nausea and is tolerating ice chips but refusing anything else at this time.  She denies any abdominal pain or tenderness to palpation.  She does have a history of colostomy and states that it was changed yesterday but she has had stool in her colostomy per her normal. Ostomy site is pink and moist w/o irritation or distention.  She denies any fevers/chills.  She denies any dysuria/hematuria dysuria/urinary frequency. UA positive for leukocytes and WBC.    Hospital medicine will admit for intractable N/V and GIB w/u and consult GI.

## 2023-02-15 NOTE — ASSESSMENT & PLAN NOTE
· Per patient, she had 30-40 episodes of vomiting PTA  · Denies constipation or diarrhea  · Abd CT:   · No convincing significant acute bowel obstruction, transmural inflammation or other acute abnormality involving the GI tract in this patient with vomiting.  There are few loops of borderline caliber small bowel in the mid abdomen at this time without a transition point and this finding is nonspecific.  · Postoperative changes of left lower quadrant colostomy with stable parastomal hernia containing loops of small bowel as described.  No concerning obstructive changes/incarceration seen.  · Again noted is an enterocolic anastomosis in the midline lower abdomen and Sohail's pouch.  · Postoperative changes of cholecystectomy, hysterectomy and left iliac vein stenting as described.  · No convincing acute focal visceral lesions.  · Hiatal hernia.  · Thoracolumbar remote compression fractures, osteopenia as well as spondylosis with lumbar stenoses as above.  · Tolerating ice chips w/o further N/V - denies abd pain

## 2023-02-15 NOTE — PHARMACY MED REC
"[facility]           Pharmacy  Admission Medication History     The home medication history was taken by Marcia Tabares.      Medication history obtained from Medications listed below were obtained from: Nursing home    Based on information gathered for medication list, you may go to "Admission" then "Reconcile Home Medications" tabs to review and/or act upon those items.     The home medication list has been updated by the Pharmacy department.   Please read ALL comments highlighted in yellow.   Please address this information as you see fit.    Feel free to contact us if you have any questions or require assistance.    The medications listed below were removed from the home medication list.  Please reorder if appropriate:    Patient reports NOT TAKING the following medication(s):  Mucinex 600mg  Micotin top powder  Zofran 8mg  Chloraseptic spray        No current facility-administered medications on file prior to encounter.     Current Outpatient Medications on File Prior to Encounter   Medication Sig Dispense Refill    acetaminophen (TYLENOL) 325 MG tablet Take 2 tablets (650 mg total) by mouth every 4 (four) hours as needed for Pain.  0    apixaban (ELIQUIS) 2.5 mg Tab Take 1 tablet (2.5 mg total) by mouth 2 (two) times daily.      calcium-vitamin D 600 mg(1,500mg) -400 unit Tab Take 1 tablet by mouth once daily.      furosemide (LASIX) 20 MG tablet Take 20 mg by mouth once daily.      levothyroxine (SYNTHROID) 125 MCG tablet TAKE 1 TABLET (125 MCG TOTAL) BY MOUTH ONCE DAILY. 90 tablet 3    losartan (COZAAR) 50 MG tablet Take 0.5 tablets (25 mg total) by mouth once daily. 90 tablet 3    magnesium oxide (MAG-OX) 400 mg (241.3 mg magnesium) tablet Take 2 tablets (800 mg total) by mouth once daily.  0    metoprolol tartrate (LOPRESSOR) 25 MG tablet Take 1 tablet (25 mg total) by mouth 2 (two) times daily. 60 tablet 11    OXcarbazepine (TRILEPTAL) 150 MG Tab Take 150 mg by mouth nightly.      " PHENobarbitaL 97.2 MG tablet Take 1 tablet (97.2 mg total) by mouth once daily at 6am. (Patient taking differently: Take 97.2 mg by mouth every evening.) 90 tablet 0    polyethylene glycol (GLYCOLAX) 17 gram PwPk Take 17 g by mouth once daily. 30 packet 5    potassium chloride SA (K-DUR,KLOR-CON) 20 MEQ tablet Take 20 mEq by mouth once daily.      pravastatin (PRAVACHOL) 20 MG tablet Take 20 mg by mouth once daily.      vitamin D (VITAMIN D3) 1000 units Tab Take 1,000 Units by mouth once daily.         Please address this information as you see fit.  Feel free to contact us if you have any questions or require assistance.    Marcia Tabares  [phone]                  .

## 2023-02-15 NOTE — SUBJECTIVE & OBJECTIVE
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       Past Surgical History:   Procedure Laterality Date    APPENDECTOMY      BACK SURGERY  1988    vertebral fracture    BACK SURGERY  02/2013    lumbar L2-5    CATARACT EXTRACTION, BILATERAL Bilateral     CHOLECYSTECTOMY      open    EYE SURGERY Bilateral     cataract removal with lens implant    HYSTERECTOMY      PERCUTANEOUS TRANSLUMINAL ANGIOPLASTY (PTA) OF PERIPHERAL VESSEL N/A 2/3/2020    Procedure: PTA, PERIPHERAL VESSEL;  Surgeon: Timmy Simmons MD;  Location: [facility] CATH LAB/EP;  Service: Cardiology;  Laterality: N/A;    PORTACATH PLACEMENT Right 09/2016    RENAL ARTERY STENT Left 07/19/2017    SIGMOIDECTOMY  10/29/2019    SMALL INTESTINE SURGERY  08/23/2016    THROMBECTOMY N/A 2/3/2020    Procedure: THROMBECTOMY;  Surgeon: Timmy Simmons MD;  Location: [facility] CATH LAB/EP;  Service: Cardiology;  Laterality: N/A;    TONSILLECTOMY      VAGINAL HYSTERECTOMY W/ ANTERIOR AND POSTERIOR VAGINAL REPAIR         Review of patient's allergies indicates:   Allergen Reactions    Adhesive Itching and Blisters    Penicillins Anaphylaxis    Tramadol Hives    Avelox [moxifloxacin] Rash     Facial and arm itching and redness. Pt states throat closes when given.    Amoxil [amoxicillin]     Aspridrox [aspirin, buffered]     Codeine Other (See Comments)     Throat swelling    Keflex [cephalexin]      Tolerated cefepime and cefazolin    Norvasc [amlodipine]     Red dye Hives    Robitussin [guaifenesin]      Sulfa (sulfonamide antibiotics)     Tylenol [acetaminophen]      Has reaction to Tylenol with red dye and unable to take Extra Strength Tylenol/ CAN ONLY TOLERATE REG STRENGTH TYLENOL    Vicks vaporub [camphor-eucalyptus oil-menthol]      Family History       Problem Relation (Age of Onset)    Heart attack Father    Hypertension Father    Pancreatic cancer Mother          Tobacco Use    Smoking status: Never    Smokeless tobacco: Never   Substance and Sexual Activity    Alcohol use: No    Drug use: No    Sexual activity: Not on file     Review of Systems   Constitutional:  Negative for chills, fever and unexpected weight change.   HENT:  Negative for congestion and trouble swallowing.    Eyes:  Negative for photophobia and visual disturbance.   Respiratory:  Negative for cough and shortness of breath.    Cardiovascular:  Negative for chest pain and leg swelling.   Gastrointestinal:  Positive for nausea and vomiting. Negative for abdominal distention, abdominal pain, blood in stool, constipation and diarrhea.   Genitourinary:  Negative for dysuria and hematuria.   Musculoskeletal:  Negative for arthralgias and myalgias.   Skin:  Negative for color change and rash.   Neurological:  Positive for weakness. Negative for dizziness, light-headedness and numbness.   Psychiatric/Behavioral:  Negative for agitation and confusion.    Objective:     Vital Signs (Most Recent):  Temp: 97.6 °F (36.4 °C) (02/15/23 0751)  Pulse: 77 (02/15/23 0751)  Resp: 18 (02/15/23 0751)  BP: (!) 131/58 (02/15/23 0751)  SpO2: 99 % (02/15/23 0751)   Vital Signs (24h Range):  Temp:  [96.7 °F (35.9 °C)-99.1 °F (37.3 °C)] 97.6 °F (36.4 °C)  Pulse:  [] 77  Resp:  [16-20] 18  SpO2:  [88 %-100 %] 99 %  BP: (114-135)/(57-71) 131/58     Weight: 68.2 kg (150 lb 5.7 oz) (02/14/23 2257)  Body mass index is 32.54 kg/m².      Intake/Output Summary (Last 24 hours) at 2/15/2023 1128  Last data filed at 2/15/2023 0654  Gross per 24 hour   Intake 110 ml    Output 500 ml   Net -390 ml       Lines/Drains/Airways       Drain  Duration                  Colostomy 10/29/19 1200 LLQ 1205 days              Peripheral Intravenous Line  Duration                  Peripheral IV - Single Lumen 02/14/23 2203 24 G Left;Posterior Hand <1 day                    Physical Exam  Vitals and nursing note reviewed.   Constitutional:       Appearance: She is well-developed.   HENT:      Head: Normocephalic and atraumatic.   Eyes:      General: No scleral icterus.     Pupils: Pupils are equal, round, and reactive to light.   Cardiovascular:      Rate and Rhythm: Normal rate and regular rhythm.      Heart sounds: Normal heart sounds.   Pulmonary:      Effort: Pulmonary effort is normal. No respiratory distress.      Breath sounds: Normal breath sounds.   Abdominal:      General: Bowel sounds are normal. There is no distension.      Palpations: Abdomen is soft.      Tenderness: There is no abdominal tenderness.      Comments: + colostomy    Musculoskeletal:         General: Normal range of motion.      Cervical back: Normal range of motion and neck supple.   Skin:     General: Skin is warm and dry.      Findings: No erythema or rash.   Neurological:      Mental Status: She is alert and oriented to person, place, and time.      Comments: No asterixis   Psychiatric:         Behavior: Behavior normal.       Significant Labs:  Recent Lab Results         02/15/23  0516   02/14/23  2359   02/14/23  2215   02/14/23  1745        Albumin       4.1       Alkaline Phosphatase       85       ALT       11       Anion Gap 9       12       Appearance, UA     Hazy         aPTT       33.6  Comment: aPTT therapeutic range = 39-69 seconds  LOT^050^APTT FSL^[id number]         AST       14       Bacteria, UA     Occasional         Baso # 0.03       0.01       Basophil % 0.3       0.1       Bilirubin (UA)     Negative         BILIRUBIN TOTAL       0.3  Comment: For infants and newborns, interpretation of results  should be based  on gestational age, weight and in agreement with clinical  observations.    Premature Infant recommended reference ranges:  Up to 24 hours.............<8.0 mg/dL  Up to 48 hours............<12.0 mg/dL  3-5 days..................<15.0 mg/dL  6-29 days.................<15.0 mg/dL         BUN 24       18       Calcium 9.6       10.1       Chloride 96       91       CO2 31       31       Color, UA     Yellow         Creatinine 1.3       1.2       Differential Method Automated       Automated       eGFR 40       44       Eos # 0.1       0.0       Eosinophil % 0.5       0.0       Glucose 98       145       Glucose, UA     Negative         Gran # (ANC) 6.4       10.3       Gran % 64.3       79.8       Group & Rh   O POS           Hematocrit 33.2       37.0       Hemoglobin 11.2       12.4       Hyaline Casts, UA     0         Immature Grans (Abs) 0.05  Comment: Mild elevation in immature granulocytes is non specific and   can be seen in a variety of conditions including stress response,   acute inflammation, trauma and pregnancy. Correlation with other   laboratory and clinical findings is essential.         0.04  Comment: Mild elevation in immature granulocytes is non specific and   can be seen in a variety of conditions including stress response,   acute inflammation, trauma and pregnancy. Correlation with other   laboratory and clinical findings is essential.         Immature Granulocytes 0.5       0.3       INDIRECT DEDE   NEG           INR       1.1  Comment: Coumadin Therapy:  2.0 - 3.0 for INR for all indicators except mechanical heart valves  and antiphospholipid syndromes which should use 2.5 - 3.5.  LOT^040^PT Inn^[id number]         Ketones, UA     Negative         Leukocytes, UA     3+         Lipase       7       Lymph # 2.4       1.8       Lymph % 24.0       14.0       MCH 32.2       32.4       MCHC 33.7       33.5       MCV 95       97       Microscopic Comment     SEE COMMENT  Comment: Other  formed elements not mentioned in the report are not   present in the microscopic examination.            Mono # 1.0       0.8       Mono % 10.4       5.8       MPV 9.7       9.6       NITRITE UA     Negative         nRBC 0       0       Occult Blood UA     Negative         pH, UA     7.0         Platelets 187       229       Potassium 4.3       4.8       PROTEIN TOTAL       7.6       Protein, UA     1+  Comment: Recommend a 24 hour urine protein or a urine   protein/creatinine ratio if globulin induced proteinuria is  clinically suspected.           Protime       11.2       RBC 3.48       3.83       RBC, UA     0         RDW 13.2       12.9       Sodium 136       134       Specific [place], UA     1.015         Specimen UA     Urine, Clean Catch         Squam Epithel, UA     4         UROBILINOGEN UA     Negative         WBC, UA     >100         WBC 9.88       12.95               Significant Imaging:  Imaging results within the past 24 hours have been reviewed.

## 2023-02-15 NOTE — ASSESSMENT & PLAN NOTE
· POA  · Per patient and ED there was coffee-ground emesis on her shirt on admission  · ASA, eliquis, and antihypertensives o/h at this time  · Protonix BID   · Monitor H/H  · GI consult ordered for the AM

## 2023-02-15 NOTE — TRANSFER OF CARE
"Anesthesia Transfer of Care Note    Patient: Annette Garcia    Procedure(s) Performed: Procedure(s) (LRB):  EGD (ESOPHAGOGASTRODUODENOSCOPY) (N/A)    Patient location: GI    Anesthesia Type: general    Transport from OR: Transported from OR on room air with adequate spontaneous ventilation    Post pain: adequate analgesia    Post assessment: no apparent anesthetic complications and tolerated procedure well    Post vital signs: stable    Level of consciousness: responds to stimulation and sedated    Nausea/Vomiting: no nausea/vomiting    Complications: none    Transfer of care protocol was followed      Last vitals:   Visit Vitals  BP (!) 151/88 (Patient Position: Lying)   Pulse 72   Temp 36.3 °C (97.3 °F) (Temporal)   Resp 20   Ht 4' 9" (1.448 m)   Wt 68.2 kg (150 lb 5.7 oz)   LMP  (LMP Unknown)   SpO2 98%   BMI 32.54 kg/m²     "

## 2023-02-15 NOTE — PROGRESS NOTES
02/14/23 2330   Admission   Initial VN Admission Questions Complete   Communication Issues? Patient Hearing   Shift   Virtual Nurse - Patient Verbalized Approval Of Camera Use   Safety/Activity   Patient Rounds bed in low position;call light in patient/parent reach;visualized patient;clutter free environment maintained;placement of personal items at bedside   Safety Promotion/Fall Prevention assistive device/personal item within reach;Fall Risk reviewed with patient/family;medications reviewed;side rails raised x 2;instructed to call staff for mobility   Positioning   Head of Bed (HOB) Positioning HOB elevated

## 2023-02-15 NOTE — ASSESSMENT & PLAN NOTE
· WBC 12.95  · Afebrile  · UA positive for leukocytes and WBC  · Patient denies symptoms of UTI  · Recheck CBC w/ AM labs to trend WBC  · Abx currently o/h at this time given asymptomatic bacteruria  · May start if symptoms develop or WBC increases

## 2023-02-15 NOTE — ASSESSMENT & PLAN NOTE
Patient with Permanent atrial fibrillation which is controlled currently with Beta Blocker. Patient is currently in atrial fibrillation.[id number] Score: 4. HASBLED Score: 4. Anticoagulation not indicated due to suspected GIB.

## 2023-02-15 NOTE — PLAN OF CARE
Reported off to Chyna, v/s  97.5  68  20  115/58  99%RA.  Pt. Denies ant present discomforts, NAD noted,.  Pt. Will be transported back to room 485  via stretcher.

## 2023-02-15 NOTE — CONSULTS
Patient ID: Annette Garcia is a 87 y.o. female.    Chief Complaint: Vomiting (C/o N/V since yesterday with coffee ground emesis reported today. Presents awake, alert, oriented. Denies pain. States nausea has subsided and last emesis about 1 hour ago. )      HPI:  HPI  87F brought here for NV for past two days. Denies ab pain but noticed minimal ostomy output past day or two. Hx of parastomal hernia repaired primarily by me in sept last year. Underwent EGD today for concern for hematemesis, found small ulcer with some bleeding.     Review of Systems   Constitutional:  Negative for fever.   HENT:  Negative for trouble swallowing.    Respiratory:  Negative for shortness of breath.    Cardiovascular:  Negative for chest pain.   Gastrointestinal:  Positive for nausea and vomiting. Negative for abdominal pain and blood in stool.   Genitourinary:  Negative for dysuria.   Musculoskeletal:  Negative for gait problem.   Skin:  Negative for rash and wound.   Allergic/Immunologic: Negative for immunocompromised state.   Neurological:  Negative for weakness.   Hematological:  Does not bruise/bleed easily.   Psychiatric/Behavioral:  Negative for agitation.      Current Facility-Administered Medications   Medication Dose Route Frequency Provider Last Rate Last Admin    diatrizoate meglumineand-diatrizoate sodium (GASTROVIEW) solution 100 mL  100 mL Oral Once Chris Johnson MD        fluconazole tablet 100 mg  100 mg Oral Daily Esperanza Cat NP        furosemide tablet 20 mg  20 mg Oral Daily Cata Alonzo NP   20 mg at 02/15/23 0928    influenza 65up-adj (QUADRIVALENT ADJUVANTED PF) vaccine 0.5 mL  0.5 mL Intramuscular vaccine x 1 dose Tyler Rocha MD        iohexoL (OMNIPAQUE 350) injection 75 mL  75 mL Intravenous ONCE PRN Keith Man DO        melatonin tablet 6 mg  6 mg Oral Nightly PRN Esperanza Cat NP        metoprolol tartrate (LOPRESSOR) tablet 25 mg  25 mg Oral BID Cata Alonzo NP    25 mg at 02/15/23 0928    ondansetron injection 4 mg  4 mg Intravenous Q6H PRN Cata Alonzo, NP        OXcarbazepine tablet 150 mg  150 mg Oral Nightly Cata Alonzo, NP   150 mg at 02/15/23 0050    pantoprazole injection 40 mg  40 mg Intravenous BID Cata Alonzo, NP   40 mg at 02/15/23 0934    PHENobarbitaL tablet 97.2 mg  97.2 mg Oral Nightly Cata Alonzo, NP   97.2 mg at 02/15/23 0050    polyethylene glycol packet 17 g  17 g Oral Daily Cata Alonzo, NP        potassium chloride SA CR tablet 20 mEq  20 mEq Oral Daily Cata Alonzo, NP   20 mEq at 02/15/23 0928    pravastatin tablet 20 mg  20 mg Oral Daily Cata Alonzo, NP   20 mg at 02/15/23 0928    prochlorperazine injection Soln 2.5 mg  2.5 mg Intravenous Q6H PRN Esperanza Cat, MICHELL        vitamin D 1000 units tablet 1,000 Units  1,000 Units Oral Daily Cata Alonzo, NP   1,000 Units at 02/15/23 0928       Review of patient's allergies indicates:   Allergen Reactions    Adhesive Itching and Blisters    Penicillins Anaphylaxis    Tramadol Hives    Avelox [moxifloxacin] Rash     Facial and arm itching and redness. Pt states throat closes when given.    Amoxil [amoxicillin]     Aspridrox [aspirin, buffered]     Codeine Other (See Comments)     Throat swelling    Keflex [cephalexin]      Tolerated cefepime and cefazolin    Norvasc [amlodipine]     Red dye Hives    Robitussin [guaifenesin]     Sulfa (sulfonamide antibiotics)     Tylenol [acetaminophen]      Has reaction to Tylenol with red dye and unable to take Extra Strength Tylenol/ CAN ONLY TOLERATE REG STRENGTH TYLENOL    Vicks vaporub [camphor-eucalyptus oil-menthol]        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    In the  setting of septic shock and near death    Petit mal epilepsy 1954    Scoliosis of lumbar spine     Seizures     Unspecified hypothyroidism     UTI (urinary tract infection) 05/22/2022       Past Surgical History:   Procedure Laterality Date    APPENDECTOMY      BACK SURGERY  1988    vertebral fracture    BACK SURGERY  02/2013    lumbar L2-5    CATARACT EXTRACTION, BILATERAL Bilateral     CHOLECYSTECTOMY      open    EYE SURGERY Bilateral     cataract removal with lens implant    HYSTERECTOMY      PERCUTANEOUS TRANSLUMINAL ANGIOPLASTY (PTA) OF PERIPHERAL VESSEL N/A 2/3/2020    Procedure: PTA, PERIPHERAL VESSEL;  Surgeon: Timmy Simmons MD;  Location: [facility] CATH LAB/EP;  Service: Cardiology;  Laterality: N/A;    PORTACATH PLACEMENT Right 09/2016    RENAL ARTERY STENT Left 07/19/2017    SIGMOIDECTOMY  10/29/2019    SMALL INTESTINE SURGERY  08/23/2016    THROMBECTOMY N/A 2/3/2020    Procedure: THROMBECTOMY;  Surgeon: Timmy Simmons MD;  Location: [facility] CATH LAB/EP;  Service: Cardiology;  Laterality: N/A;    TONSILLECTOMY      VAGINAL HYSTERECTOMY W/ ANTERIOR AND POSTERIOR VAGINAL REPAIR         Family History   Problem Relation Age of Onset    Pancreatic cancer Mother     Hypertension Father     Heart attack Father        Social History     Socioeconomic History    Marital status:    Tobacco Use    Smoking status: Never    Smokeless tobacco: Never   Substance and Sexual Activity    Alcohol use: No    Drug use: No     Social Determinants of Health     Financial Resource Strain: Low Risk     Difficulty of Paying Living Expenses: Not hard at all   Food Insecurity: No Food Insecurity    Worried About Running Out of Food in the Last Year: Never true    Ran Out of Food in the Last Year: Never true   Transportation Needs: No Transportation Needs    Lack of Transportation (Medical): No    Lack of Transportation (Non-Medical): No   Physical Activity: Inactive    Days of Exercise per Week: 0 days    Minutes of Exercise  per Session: 0 min   Stress: No Stress Concern Present    Feeling of Stress : Not at all   Housing Stability: Low Risk     Unable to Pay for Housing in the Last Year: No    Number of Places Lived in the Last Year: 1    Unstable Housing in the Last Year: No       Vitals:    02/15/23 1255   BP: (!) 140/61   Pulse: 70   Resp: 18   Temp: 96.6 °F (35.9 °C)       Physical Exam  Constitutional:       General: She is not in acute distress.     Appearance: She is well-developed.   HENT:      Head: Normocephalic and atraumatic.   Eyes:      General: No scleral icterus.  Cardiovascular:      Rate and Rhythm: Normal rate.   Pulmonary:      Effort: Pulmonary effort is normal.      Breath sounds: No stridor.   Abdominal:      General: There is no distension.      Palpations: Abdomen is soft.      Tenderness: There is no abdominal tenderness.      Hernia: A hernia is present.      Comments: Ostomy site hernia feels at least partially reducible, nontender, no output in colostomy bag   Lymphadenopathy:      Cervical: No cervical adenopathy.   Skin:     General: Skin is warm.      Findings: No erythema.   Neurological:      Mental Status: She is alert and oriented to person, place, and time.   Psychiatric:         Behavior: Behavior normal.   Body mass index is 32.54 kg/m².  CT shows recurrent parastomal hernia with loop of small bowel, appears to contain contrast.     Assessment & Plan:   87F with parastomal hernia, possible SBO  Concerning for SBO at parastomal hernia site  Keep on clears for now, will give gastrografin PO and follow with XRs, if begins having ostomy output and feels well can advance...otherwise may need hernia repair

## 2023-02-15 NOTE — NURSING
diatrizoate meglumineand-diatrizoate sodium (GASTROVIEW) solution 100 mL     Given per MAR at 15:07

## 2023-02-15 NOTE — PLAN OF CARE
SW met with pt at bedside to complete assessment. Pt is AxO x3  and able to verbally answer assessment questions. Pt was able to confirm demographic information to the best of her abilities. Pt mentioned unsure of how long she has been a resident at [facility] due to being in a coma when she first entered facility. Pt reports believing she has been a resident for 6yrs. Pt reports to have support of her 7 children who call often and visit when able. Pt reports to get assistance with ADL's from staff but able to ambulate with rolling walker a d wheelchair. At time of discharge pt to be transported back to [facility] via [facility] transportation assistance. SW updated whiteboard with [facility] name and contact information. SW confirmed pt understanding of Observation unit and expected discharge plan. SW will continue to follow pt throughout care and assist with any discharge needs.         02/15/23 1533   Discharge Planning   Assessment Type Discharge Planning Brief Assessment   Resource/Environmental Concerns none   Support Systems Children;Family members   Equipment Currently Used at Home walker, rolling;wheelchair   Current Living Arrangements residential facility   Patient/Family Anticipates Transition to long-term care facility   Patient/Family Anticipated Services at Transition none   DME Needed Upon Discharge  none   Discharge Plan A Return to nursing home     No future appointments.    ZARIA [facility] Case Management  [phone]

## 2023-02-15 NOTE — ASSESSMENT & PLAN NOTE
· Takes levothyroxine at home but the levothyroxine we have in-house has red dye in it, which patient has a severe allergy to

## 2023-02-15 NOTE — CONSULTS
[facility] ([facility])  Gastroenterology  Consult Note    Patient Name: Annette Garcia  MRN: [id number]  Admission Date: 2/14/2023  Hospital Length of Stay: 0 days  Code Status: Full Code   Attending Provider: Tyler Rocha, *   Consulting Provider: Keith Chavarria MD  Primary Care Physician: Jose Valles MD  Principal Problem:Intractable vomiting    Inpatient consult to Gastroenterology  Consult performed by: Keith Chavarria MD  Consult ordered by: Cata Alonzo NP        Subjective:     HPI:  87-year-old female with past medical history significant for hypertension, seizure disorder, AFib on Eliquis and 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  In the ED patient was found to have drop in hemoglobin.  Out of concern for GI bleed patient was admitted hospital medicine GI was consulted.      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    In the setting of septic shock and near death    Petit mal epilepsy 1954    Scoliosis of lumbar spine     Seizures     Unspecified hypothyroidism     UTI (urinary tract infection) 05/22/2022       Past Surgical History:   Procedure Laterality Date    APPENDECTOMY      BACK SURGERY  1988    vertebral fracture    BACK SURGERY  02/2013    lumbar L2-5    CATARACT EXTRACTION, BILATERAL Bilateral     CHOLECYSTECTOMY      open    EYE SURGERY Bilateral     cataract removal with lens implant    HYSTERECTOMY      PERCUTANEOUS TRANSLUMINAL ANGIOPLASTY (PTA) OF PERIPHERAL VESSEL N/A 2/3/2020    Procedure: PTA, PERIPHERAL VESSEL;  Surgeon: Timmy Simmons  MD;  Location: [facility] CATH LAB/EP;  Service: Cardiology;  Laterality: N/A;    PORTACATH PLACEMENT Right 09/2016    RENAL ARTERY STENT Left 07/19/2017    SIGMOIDECTOMY  10/29/2019    SMALL INTESTINE SURGERY  08/23/2016    THROMBECTOMY N/A 2/3/2020    Procedure: THROMBECTOMY;  Surgeon: Timmy Simmons MD;  Location: [facility] CATH LAB/EP;  Service: Cardiology;  Laterality: N/A;    TONSILLECTOMY      VAGINAL HYSTERECTOMY W/ ANTERIOR AND POSTERIOR VAGINAL REPAIR         Review of patient's allergies indicates:   Allergen Reactions    Adhesive Itching and Blisters    Penicillins Anaphylaxis    Tramadol Hives    Avelox [moxifloxacin] Rash     Facial and arm itching and redness. Pt states throat closes when given.    Amoxil [amoxicillin]     Aspridrox [aspirin, buffered]     Codeine Other (See Comments)     Throat swelling    Keflex [cephalexin]      Tolerated cefepime and cefazolin    Norvasc [amlodipine]     Red dye Hives    Robitussin [guaifenesin]     Sulfa (sulfonamide antibiotics)     Tylenol [acetaminophen]      Has reaction to Tylenol with red dye and unable to take Extra Strength Tylenol/ CAN ONLY TOLERATE REG STRENGTH TYLENOL    Vicks vaporub [camphor-eucalyptus oil-menthol]      Family History       Problem Relation (Age of Onset)    Heart attack Father    Hypertension Father    Pancreatic cancer Mother          Tobacco Use    Smoking status: Never    Smokeless tobacco: Never   Substance and Sexual Activity    Alcohol use: No    Drug use: No    Sexual activity: Not on file     Review of Systems   Constitutional:  Negative for chills, fever and unexpected weight change.   HENT:  Negative for congestion and trouble swallowing.    Eyes:  Negative for photophobia and visual disturbance.   Respiratory:  Negative for cough and shortness of breath.    Cardiovascular:  Negative for chest pain and leg swelling.   Gastrointestinal:  Positive for nausea and vomiting. Negative for abdominal distention,  abdominal pain, blood in stool, constipation and diarrhea.   Genitourinary:  Negative for dysuria and hematuria.   Musculoskeletal:  Negative for arthralgias and myalgias.   Skin:  Negative for color change and rash.   Neurological:  Positive for weakness. Negative for dizziness, light-headedness and numbness.   Psychiatric/Behavioral:  Negative for agitation and confusion.    Objective:     Vital Signs (Most Recent):  Temp: 97.6 °F (36.4 °C) (02/15/23 0751)  Pulse: 77 (02/15/23 0751)  Resp: 18 (02/15/23 0751)  BP: (!) 131/58 (02/15/23 0751)  SpO2: 99 % (02/15/23 0751)   Vital Signs (24h Range):  Temp:  [96.7 °F (35.9 °C)-99.1 °F (37.3 °C)] 97.6 °F (36.4 °C)  Pulse:  [] 77  Resp:  [16-20] 18  SpO2:  [88 %-100 %] 99 %  BP: (114-135)/(57-71) 131/58     Weight: 68.2 kg (150 lb 5.7 oz) (02/14/23 2257)  Body mass index is 32.54 kg/m².      Intake/Output Summary (Last 24 hours) at 2/15/2023 1128  Last data filed at 2/15/2023 0654  Gross per 24 hour   Intake 110 ml   Output 500 ml   Net -390 ml       Lines/Drains/Airways       Drain  Duration                  Colostomy 10/29/19 1200 LLQ 1205 days              Peripheral Intravenous Line  Duration                  Peripheral IV - Single Lumen 02/14/23 2203 24 G Left;Posterior Hand <1 day                    Physical Exam  Vitals and nursing note reviewed.   Constitutional:       Appearance: She is well-developed.   HENT:      Head: Normocephalic and atraumatic.   Eyes:      General: No scleral icterus.     Pupils: Pupils are equal, round, and reactive to light.   Cardiovascular:      Rate and Rhythm: Normal rate and regular rhythm.      Heart sounds: Normal heart sounds.   Pulmonary:      Effort: Pulmonary effort is normal. No respiratory distress.      Breath sounds: Normal breath sounds.   Abdominal:      General: Bowel sounds are normal. There is no distension.      Palpations: Abdomen is soft.      Tenderness: There is no abdominal tenderness.      Comments: +  colostomy    Musculoskeletal:         General: Normal range of motion.      Cervical back: Normal range of motion and neck supple.   Skin:     General: Skin is warm and dry.      Findings: No erythema or rash.   Neurological:      Mental Status: She is alert and oriented to person, place, and time.      Comments: No asterixis   Psychiatric:         Behavior: Behavior normal.       Significant Labs:  Recent Lab Results         02/15/23  0516   02/14/23  2359   02/14/23  2215   02/14/23  1745        Albumin       4.1       Alkaline Phosphatase       85       ALT       11       Anion Gap 9       12       Appearance, UA     Hazy         aPTT       33.6  Comment: aPTT therapeutic range = 39-69 seconds  LOT^050^APTT FSL^[id number]         AST       14       Bacteria, UA     Occasional         Baso # 0.03       0.01       Basophil % 0.3       0.1       Bilirubin (UA)     Negative         BILIRUBIN TOTAL       0.3  Comment: For infants and newborns, interpretation of results should be based  on gestational age, weight and in agreement with clinical  observations.    Premature Infant recommended reference ranges:  Up to 24 hours.............<8.0 mg/dL  Up to 48 hours............<12.0 mg/dL  3-5 days..................<15.0 mg/dL  6-29 days.................<15.0 mg/dL         BUN 24       18       Calcium 9.6       10.1       Chloride 96       91       CO2 31       31       Color, UA     Yellow         Creatinine 1.3       1.2       Differential Method Automated       Automated       eGFR 40       44       Eos # 0.1       0.0       Eosinophil % 0.5       0.0       Glucose 98       145       Glucose, UA     Negative         Gran # (ANC) 6.4       10.3       Gran % 64.3       79.8       Group & Rh   O POS           Hematocrit 33.2       37.0       Hemoglobin 11.2       12.4       Hyaline Casts, UA     0         Immature Grans (Abs) 0.05  Comment: Mild elevation in immature granulocytes is non specific and   can be seen in a variety  of conditions including stress response,   acute inflammation, trauma and pregnancy. Correlation with other   laboratory and clinical findings is essential.         0.04  Comment: Mild elevation in immature granulocytes is non specific and   can be seen in a variety of conditions including stress response,   acute inflammation, trauma and pregnancy. Correlation with other   laboratory and clinical findings is essential.         Immature Granulocytes 0.5       0.3       INDIRECT DEDE   NEG           INR       1.1  Comment: Coumadin Therapy:  2.0 - 3.0 for INR for all indicators except mechanical heart valves  and antiphospholipid syndromes which should use 2.5 - 3.5.  LOT^040^PT Inn^[id number]         Ketones, UA     Negative         Leukocytes, UA     3+         Lipase       7       Lymph # 2.4       1.8       Lymph % 24.0       14.0       MCH 32.2       32.4       MCHC 33.7       33.5       MCV 95       97       Microscopic Comment     SEE COMMENT  Comment: Other formed elements not mentioned in the report are not   present in the microscopic examination.            Mono # 1.0       0.8       Mono % 10.4       5.8       MPV 9.7       9.6       NITRITE UA     Negative         nRBC 0       0       Occult Blood UA     Negative         pH, UA     7.0         Platelets 187       229       Potassium 4.3       4.8       PROTEIN TOTAL       7.6       Protein, UA     1+  Comment: Recommend a 24 hour urine protein or a urine   protein/creatinine ratio if globulin induced proteinuria is  clinically suspected.           Protime       11.2       RBC 3.48       3.83       RBC, UA     0         RDW 13.2       12.9       Sodium 136       134       Specific Egan, UA     1.015         Specimen UA     Urine, Clean Catch         Squam Epithel, UA     4         UROBILINOGEN UA     Negative         WBC, UA     >100         WBC 9.88       12.95               Significant Imaging:  Imaging results within the past 24 hours have been  reviewed.    Assessment/Plan:     Hematemesis  Given history, concern for MW tear  Plan EGD today  NPO  Continue BID PPI  Trend Hgb        Thank you for your consult. I will follow-up with patient. Please contact us if you have any additional questions.    Keith Chavarria MD  Gastroenterology  [place] [facility] ([facility])

## 2023-02-15 NOTE — ANESTHESIA POSTPROCEDURE EVALUATION
Anesthesia Post Evaluation    Patient: Annette Garcia    Procedure(s) Performed: Procedure(s) (LRB):  EGD (ESOPHAGOGASTRODUODENOSCOPY) (N/A)    Final Anesthesia Type: general      Patient location during evaluation: PACU  Patient participation: Yes- Able to Participate  Level of consciousness: awake and alert  Post-procedure vital signs: reviewed and stable  Pain management: adequate  Airway patency: patent    PONV status at discharge: No PONV  Anesthetic complications: no      Cardiovascular status: blood pressure returned to baseline  Respiratory status: unassisted  Hydration status: euvolemic  Follow-up not needed.          Vitals Value Taken Time   /38 02/15/23 1222   Temp 36.4 °C (97.5 °F) 02/15/23 1152   Pulse 70 02/15/23 1239   Resp 20 02/15/23 1222   SpO2 99 % 02/15/23 1222         Event Time   Out of Recovery 02/15/2023 12:22:00         Pain/Emerald Score: Emerald Score: 10 (2/15/2023 12:22 PM)

## 2023-02-15 NOTE — ANESTHESIA PREPROCEDURE EVALUATION
02/15/2023  Annette Garcia is a 87 y.o., female with coffee ground emesis here for EGD.    Pre-operative evaluation for Procedure(s) (LRB):  EGD (ESOPHAGOGASTRODUODENOSCOPY) (N/A)        Patient Active Problem List   Diagnosis    Renovascular hypertension    Seizure disorder    Hypothyroidism    Osteoarthritis of lumbar spine    Degenerative joint disease of sacroiliac joint    S/P exploratory laparotomy    DLBCL (diffuse large B cell lymphoma)    Hypertension    Anemia due to antineoplastic chemotherapy    Bilateral renal artery stenosis    Hyponatremia    Closed comminuted fracture of right humerus    Closed displaced fracture of distal phalanx of right little finger    Age-related osteoporosis with current pathological fracture with routine healing    Closed wedge compression fracture of eleventh thoracic vertebra with routine healing    DDD (degenerative disc disease), lumbar    Chronic bilateral low back pain without sciatica    Age-related osteoporosis without current pathological fracture    Vitamin D deficiency    Subjective memory complaints    Depression    Old MI (myocardial infarction)    Peripheral vascular disease, unspecified    Slow transit constipation    Abdominal pain    Sinus pause    Anemia of acute infection    Urinary retention    Hoarseness    Paralysis of right vocal cord    Hypomagnesemia    Palliative care encounter    Counseling regarding advance care planning and goals of care    Advance care planning    S/P partial resection of colon    Atrial fibrillation    Acute deep vein thrombosis (DVT) of femoral vein of left lower extremity    Nursing home resident    Bilateral leg edema    May-Thurner syndrome    Syncope    Hypotensive episode    Coronary artery disease    Chronic anemia    DVT of deep femoral vein, left    Chronic  anticoagulation    Colitis    Colostomy present    Partial small bowel obstruction    Leukocytosis    Small bowel obstruction    h/o DVT (deep venous thrombosis)    Chronic deep vein thrombosis (DVT) of left lower extremity    DNR (do not resuscitate)    Major depressive disorder, recurrent, unspecified    Hematemesis    Intractable vomiting       Review of patient's allergies indicates:   Allergen Reactions    Adhesive Itching and Blisters    Penicillins Anaphylaxis    Tramadol Hives    Avelox [moxifloxacin] Rash     Facial and arm itching and redness. Pt states throat closes when given.    Amoxil [amoxicillin]     Aspridrox [aspirin, buffered]     Codeine Other (See Comments)     Throat swelling    Keflex [cephalexin]      Tolerated cefepime and cefazolin    Norvasc [amlodipine]     Red dye Hives    Robitussin [guaifenesin]     Sulfa (sulfonamide antibiotics)     Tylenol [acetaminophen]      Has reaction to Tylenol with red dye and unable to take Extra Strength Tylenol/ CAN ONLY TOLERATE REG STRENGTH TYLENOL    Vicks vaporub [camphor-eucalyptus oil-menthol]        No current facility-administered medications on file prior to encounter.     Current Outpatient Medications on File Prior to Encounter   Medication Sig Dispense Refill    acetaminophen (TYLENOL) 325 MG tablet Take 2 tablets (650 mg total) by mouth every 4 (four) hours as needed for Pain.  0    apixaban (ELIQUIS) 2.5 mg Tab Take 1 tablet (2.5 mg total) by mouth 2 (two) times daily.      calcium-vitamin D 600 mg(1,500mg) -400 unit Tab Take 1 tablet by mouth once daily.      furosemide (LASIX) 20 MG tablet Take 20 mg by mouth once daily.      levothyroxine (SYNTHROID) 125 MCG tablet TAKE 1 TABLET (125 MCG TOTAL) BY MOUTH ONCE DAILY. 90 tablet 3    losartan (COZAAR) 50 MG tablet Take 0.5 tablets (25 mg total) by mouth once daily. 90 tablet 3    magnesium oxide (MAG-OX) 400 mg (241.3 mg magnesium) tablet Take 2 tablets (800 mg  total) by mouth once daily.  0    metoprolol tartrate (LOPRESSOR) 25 MG tablet Take 1 tablet (25 mg total) by mouth 2 (two) times daily. 60 tablet 11    OXcarbazepine (TRILEPTAL) 150 MG Tab Take 150 mg by mouth nightly.      PHENobarbitaL 97.2 MG tablet Take 1 tablet (97.2 mg total) by mouth once daily at 6am. (Patient taking differently: Take 97.2 mg by mouth every evening.) 90 tablet 0    polyethylene glycol (GLYCOLAX) 17 gram PwPk Take 17 g by mouth once daily. 30 packet 5    potassium chloride SA (K-DUR,KLOR-CON) 20 MEQ tablet Take 20 mEq by mouth once daily.      pravastatin (PRAVACHOL) 20 MG tablet Take 20 mg by mouth once daily.      vitamin D (VITAMIN D3) 1000 units Tab Take 1,000 Units by mouth once daily.         Past Surgical History:   Procedure Laterality Date    APPENDECTOMY      BACK SURGERY  1988    vertebral fracture    BACK SURGERY  02/2013    lumbar L2-5    CATARACT EXTRACTION, BILATERAL Bilateral     CHOLECYSTECTOMY      open    EYE SURGERY Bilateral     cataract removal with lens implant    HYSTERECTOMY      PERCUTANEOUS TRANSLUMINAL ANGIOPLASTY (PTA) OF PERIPHERAL VESSEL N/A 2/3/2020    Procedure: PTA, PERIPHERAL VESSEL;  Surgeon: Timmy Simmons MD;  Location: [facility] CATH LAB/EP;  Service: Cardiology;  Laterality: N/A;    PORTACATH PLACEMENT Right 09/2016    RENAL ARTERY STENT Left 07/19/2017    SIGMOIDECTOMY  10/29/2019    SMALL INTESTINE SURGERY  08/23/2016    THROMBECTOMY N/A 2/3/2020    Procedure: THROMBECTOMY;  Surgeon: Timmy Simmons MD;  Location: [facility] CATH LAB/EP;  Service: Cardiology;  Laterality: N/A;    TONSILLECTOMY      VAGINAL HYSTERECTOMY W/ ANTERIOR AND POSTERIOR VAGINAL REPAIR         Social History     Socioeconomic History    Marital status:    Tobacco Use    Smoking status: Never    Smokeless tobacco: Never   Substance and Sexual Activity    Alcohol use: No    Drug use: No     Social Determinants of Health     Financial Resource Strain:  Low Risk     Difficulty of Paying Living Expenses: Not hard at all   Food Insecurity: No Food Insecurity    Worried About Running Out of Food in the Last Year: Never true    Ran Out of Food in the Last Year: Never true   Transportation Needs: No Transportation Needs    Lack of Transportation (Medical): No    Lack of Transportation (Non-Medical): No   Physical Activity: Inactive    Days of Exercise per Week: 0 days    Minutes of Exercise per Session: 0 min   Stress: No Stress Concern Present    Feeling of Stress : Not at all   Housing Stability: Low Risk     Unable to Pay for Housing in the Last Year: No    Number of Places Lived in the Last Year: 1    Unstable Housing in the Last Year: No         CBC:   Recent Labs     02/14/23  1745 02/15/23  0516   WBC 12.95* 9.88   RBC 3.83* 3.48*   HGB 12.4 11.2*   HCT 37.0 33.2*    187   MCV 97 95   MCH 32.4* 32.2*   MCHC 33.5 33.7       CMP:   Recent Labs     02/14/23  1745 02/15/23  0516   * 136   K 4.8 4.3   CL 91* 96   CO2 31* 31*   BUN 18 24*   CREATININE 1.2 1.3   * 98   CALCIUM 10.1 9.6   ALBUMIN 4.1  --    PROT 7.6  --    ALKPHOS 85  --    ALT 11  --    AST 14  --    BILITOT 0.3  --        INR  Recent Labs     02/14/23 1745   INR 1.1   APTT 33.6*               2D Echo:  Results for orders placed or performed during the hospital encounter of 09/16/16   2D echo with color flow doppler   Result Value Ref Range    EF + QEF 60 55 - 65    Diastolic Dysfunction No     Est. PA Systolic Pressure 17.9            Pre-op Assessment    I have reviewed the Patient Summary Reports.     I have reviewed the Nursing Notes.    I have reviewed the Medications.     Review of Systems  Anesthesia Hx:  No problems with previous Anesthesia    Hematology/Oncology:  Hematology Normal   Oncology Normal     EENT/Dental:EENT/Dental Normal   Cardiovascular:   Hypertension CAD      Pulmonary:  Pulmonary Normal    Renal/:   Chronic Renal Disease     Hepatic/GI:  Hepatic/GI Normal    Musculoskeletal:   Arthritis     Neurological:   Seizures    Endocrine:  Endocrine Normal    Dermatological:  Skin Normal    Psych:   Psychiatric History          Physical Exam  General: Well nourished    Airway:  Mouth Opening: Normal  TM Distance: Normal  Tongue: Normal  Neck ROM: Normal ROM    Dental:  Intact    Chest/Lungs:  Normal Respiratory Rate    Heart:  Rate: Normal        Anesthesia Plan  Type of Anesthesia, risks & benefits discussed:    Anesthesia Type: Gen Natural Airway  Intra-op Monitoring Plan: Standard ASA Monitors  Post Op Pain Control Plan: multimodal analgesia  Induction:  IV  Informed Consent: Informed consent signed with the Patient and all parties understand the risks and agree with anesthesia plan.  All questions answered.   ASA Score: 3    Ready For Surgery From Anesthesia Perspective.     .

## 2023-02-16 VITALS
DIASTOLIC BLOOD PRESSURE: 67 MMHG | HEART RATE: 64 BPM | TEMPERATURE: 97 F | HEIGHT: 57 IN | OXYGEN SATURATION: 94 % | WEIGHT: 150.38 LBS | SYSTOLIC BLOOD PRESSURE: 156 MMHG | BODY MASS INDEX: 32.44 KG/M2 | RESPIRATION RATE: 18 BRPM

## 2023-02-16 LAB
ANION GAP SERPL CALC-SCNC: 11 MMOL/L (ref 8–16)
BASOPHILS # BLD AUTO: 0.03 K/UL (ref 0–0.2)
BASOPHILS NFR BLD: 0.4 % (ref 0–1.9)
BUN SERPL-MCNC: 25 MG/DL (ref 8–23)
CALCIUM SERPL-MCNC: 9.2 MG/DL (ref 8.7–10.5)
CHLORIDE SERPL-SCNC: 98 MMOL/L (ref 95–110)
CO2 SERPL-SCNC: 28 MMOL/L (ref 23–29)
CREAT SERPL-MCNC: 0.8 MG/DL (ref 0.5–1.4)
DIFFERENTIAL METHOD: ABNORMAL
EOSINOPHIL # BLD AUTO: 0.3 K/UL (ref 0–0.5)
EOSINOPHIL NFR BLD: 3.4 % (ref 0–8)
ERYTHROCYTE [DISTWIDTH] IN BLOOD BY AUTOMATED COUNT: 13.2 % (ref 11.5–14.5)
EST. GFR  (NO RACE VARIABLE): >60 ML/MIN/1.73 M^2
GLUCOSE SERPL-MCNC: 97 MG/DL (ref 70–110)
HCT VFR BLD AUTO: 30.7 % (ref 37–48.5)
HGB BLD-MCNC: 10.2 G/DL (ref 12–16)
IMM GRANULOCYTES # BLD AUTO: 0.02 K/UL (ref 0–0.04)
IMM GRANULOCYTES NFR BLD AUTO: 0.3 % (ref 0–0.5)
LYMPHOCYTES # BLD AUTO: 1.9 K/UL (ref 1–4.8)
LYMPHOCYTES NFR BLD: 23.3 % (ref 18–48)
MCH RBC QN AUTO: 32 PG (ref 27–31)
MCHC RBC AUTO-ENTMCNC: 33.2 G/DL (ref 32–36)
MCV RBC AUTO: 96 FL (ref 82–98)
MONOCYTES # BLD AUTO: 0.7 K/UL (ref 0.3–1)
MONOCYTES NFR BLD: 9.3 % (ref 4–15)
NEUTROPHILS # BLD AUTO: 5 K/UL (ref 1.8–7.7)
NEUTROPHILS NFR BLD: 63.3 % (ref 38–73)
NRBC BLD-RTO: 0 /100 WBC
PLATELET # BLD AUTO: 169 K/UL (ref 150–450)
PMV BLD AUTO: 9.7 FL (ref 9.2–12.9)
POTASSIUM SERPL-SCNC: 3.8 MMOL/L (ref 3.5–5.1)
RBC # BLD AUTO: 3.19 M/UL (ref 4–5.4)
SODIUM SERPL-SCNC: 137 MMOL/L (ref 136–145)
WBC # BLD AUTO: 7.94 K/UL (ref 3.9–12.7)

## 2023-02-16 PROCEDURE — 80048 BASIC METABOLIC PNL TOTAL CA: CPT | Performed by: NURSE PRACTITIONER

## 2023-02-16 PROCEDURE — 25000003 PHARM REV CODE 250: Performed by: NURSE PRACTITIONER

## 2023-02-16 PROCEDURE — G0378 HOSPITAL OBSERVATION PER HR: HCPCS

## 2023-02-16 PROCEDURE — 36415 COLL VENOUS BLD VENIPUNCTURE: CPT | Performed by: NURSE PRACTITIONER

## 2023-02-16 PROCEDURE — 99232 PR SUBSEQUENT HOSPITAL CARE,LEVL II: ICD-10-PCS | Mod: ,,, | Performed by: STUDENT IN AN ORGANIZED HEALTH CARE EDUCATION/TRAINING PROGRAM

## 2023-02-16 PROCEDURE — 99232 SBSQ HOSP IP/OBS MODERATE 35: CPT | Mod: ,,, | Performed by: STUDENT IN AN ORGANIZED HEALTH CARE EDUCATION/TRAINING PROGRAM

## 2023-02-16 PROCEDURE — 63600175 PHARM REV CODE 636 W HCPCS: Performed by: NURSE PRACTITIONER

## 2023-02-16 PROCEDURE — 96376 TX/PRO/DX INJ SAME DRUG ADON: CPT

## 2023-02-16 PROCEDURE — 85025 COMPLETE CBC W/AUTO DIFF WBC: CPT | Performed by: NURSE PRACTITIONER

## 2023-02-16 PROCEDURE — C9113 INJ PANTOPRAZOLE SODIUM, VIA: HCPCS | Performed by: NURSE PRACTITIONER

## 2023-02-16 RX ORDER — PHENOBARBITAL 97.2 MG/1
97.2 TABLET ORAL NIGHTLY
Qty: 30 TABLET | Refills: 0 | Status: SHIPPED | OUTPATIENT
Start: 2023-02-16 | End: 2023-03-22 | Stop reason: SDUPTHER

## 2023-02-16 RX ORDER — PANTOPRAZOLE SODIUM 40 MG/1
40 TABLET, DELAYED RELEASE ORAL DAILY
Qty: 90 TABLET | Refills: 0 | Status: SHIPPED | OUTPATIENT
Start: 2023-02-16 | End: 2023-12-15 | Stop reason: SDUPTHER

## 2023-02-16 RX ADMIN — PANTOPRAZOLE SODIUM 40 MG: 40 INJECTION, POWDER, LYOPHILIZED, FOR SOLUTION INTRAVENOUS at 10:02

## 2023-02-16 RX ADMIN — METOPROLOL TARTRATE 25 MG: 25 TABLET, FILM COATED ORAL at 10:02

## 2023-02-16 RX ADMIN — PRAVASTATIN SODIUM 20 MG: 20 TABLET ORAL at 10:02

## 2023-02-16 RX ADMIN — FUROSEMIDE 20 MG: 20 TABLET ORAL at 10:02

## 2023-02-16 RX ADMIN — POTASSIUM CHLORIDE 20 MEQ: 1500 TABLET, EXTENDED RELEASE ORAL at 10:02

## 2023-02-16 RX ADMIN — CHOLECALCIFEROL TAB 25 MCG (1000 UNIT) 1000 UNITS: 25 TAB at 10:02

## 2023-02-16 NOTE — PLAN OF CARE
SW noted pt able to return to facility pending medical review. SW will need facility transfer Orders entered and SW to update via careport. Once reviewed and facility confirmed SW to set up transport and provide staff with room assignment and number to call report.     SW noted orders and sent via [employer].     02/16/23 0909   Post-Acute Status   Post-Acute Authorization Placement   Post-Acute Placement Status Referrals Sent   Discharge Delays None known at this time   Discharge Plan   Discharge Plan A Return to nursing home     No future appointments.    ZARIA Padronner Case Management  [phone]

## 2023-02-16 NOTE — DISCHARGE INSTRUCTIONS
[facility] Transfer Orders        Admit to: Nursing home    Diagnoses:   Active Hospital Problems    Diagnosis  POA    *Intractable vomiting [R11.10]  Yes    Hematemesis [K92.0]  Yes    Parastomal hernia with obstruction and without gangrene [K43.3]  Yes    Leukocytosis [D72.829]  Yes    Colostomy present [Z93.3]  Not Applicable    Coronary artery disease [I25.10]  Yes    Atrial fibrillation [I48.91]  Yes    Peripheral vascular disease, unspecified [I73.9]  Yes     Chronic    Hypertension [I10]  Yes    Seizure disorder [G40.909]  Yes     Epilepsy type unknown      Hypothyroidism [E03.9]  Yes      Resolved Hospital Problems   No resolved problems to display.     Allergies:   Review of patient's allergies indicates:   Allergen Reactions    Adhesive Itching and Blisters    Penicillins Anaphylaxis    Tramadol Hives    Avelox [moxifloxacin] Rash     Facial and arm itching and redness. Pt states throat closes when given.    Amoxil [amoxicillin]     Aspridrox [aspirin, buffered]     Codeine Other (See Comments)     Throat swelling    Keflex [cephalexin]      Tolerated cefepime and cefazolin    Norvasc [amlodipine]     Red dye Hives    Robitussin [guaifenesin]     Sulfa (sulfonamide antibiotics)     Tylenol [acetaminophen]      Has reaction to Tylenol with red dye and unable to take Extra Strength Tylenol/ CAN ONLY TOLERATE REG STRENGTH TYLENOL    Vicks vaporub [camphor-eucalyptus oil-menthol]        Code Status: Full    Vitals: Routine       Diet: cardiac diet    Activity: Activity as tolerated    Nursing Precautions: Fall    Bed/Surface: Low Air Loss    Consults: PT to evaluate and treat- 5 times a week and OT to evaluate and treat- 5 times a week    Oxygen: room air    Dialysis: Patient is not on dialysis.       Pending Diagnostic Studies:       None               Medications: Discontinue all previous medication orders, if any. See new list below.  Current Discharge Medication List        START taking  these medications    Details   pantoprazole (PROTONIX) 40 MG tablet Take 1 tablet (40 mg total) by mouth once daily.  Qty: 90 tablet, Refills: 0           CONTINUE these medications which have CHANGED    Details   PHENobarbitaL 97.2 MG tablet Take 1 tablet (97.2 mg total) by mouth every evening.  Qty: 30 tablet, Refills: 0           CONTINUE these medications which have NOT CHANGED    Details   acetaminophen (TYLENOL) 325 MG tablet Take 2 tablets (650 mg total) by mouth every 4 (four) hours as needed for Pain.  Refills: 0      apixaban (ELIQUIS) 2.5 mg Tab Take 1 tablet (2.5 mg total) by mouth 2 (two) times daily.  Qty:        calcium-vitamin D 600 mg(1,500mg) -400 unit Tab Take 1 tablet by mouth once daily.      furosemide (LASIX) 20 MG tablet Take 20 mg by mouth once daily.      levothyroxine (SYNTHROID) 125 MCG tablet TAKE 1 TABLET (125 MCG TOTAL) BY MOUTH ONCE DAILY.  Qty: 90 tablet, Refills: 3      losartan (COZAAR) 50 MG tablet Take 0.5 tablets (25 mg total) by mouth once daily.  Qty: 90 tablet, Refills: 3    Comments: .      magnesium oxide (MAG-OX) 400 mg (241.3 mg magnesium) tablet Take 2 tablets (800 mg total) by mouth once daily.  Refills: 0      metoprolol tartrate (LOPRESSOR) 25 MG tablet Take 1 tablet (25 mg total) by mouth 2 (two) times daily.  Qty: 60 tablet, Refills: 11    Comments: .      OXcarbazepine (TRILEPTAL) 150 MG Tab Take 150 mg by mouth nightly.      polyethylene glycol (GLYCOLAX) 17 gram PwPk Take 17 g by mouth once daily.  Qty: 30 packet, Refills: 5      potassium chloride SA (K-DUR,KLOR-CON) 20 MEQ tablet Take 20 mEq by mouth once daily.      pravastatin (PRAVACHOL) 20 MG tablet Take 20 mg by mouth once daily.      vitamin D (VITAMIN D3) 1000 units Tab Take 1,000 Units by mouth once daily.           Follow up:    Follow-up Information       Jose Valles MD Follow up in 1 week(s).    Specialty: Family Medicine  Contact information:  [address] [facility]  [address]  [phone]               Keith Chavarria MD Follow up in 2 week(s).    Specialties: Gastroenterology, Hospitalist  Contact information:  [address] [place] [address]  [address]  [facility] [address] [phone]                               Immunizations Administered as of 2/16/2023       No immunizations on file.                Lucretia Eller, NP

## 2023-02-16 NOTE — PLAN OF CARE
D/C recs noted. Pt to have facility physician follow up. Please call report to Nurse Violet at [phone]. Pt will be returning to room 115A.     At time of discharge pt will be transported to [facility] by Ambulance. Staff can check ETA under encounters.     DME at discharge: none  Home Health: none- facility to support.    Pt has follow up appointments added to AVS.         02/16/23 1330   Final Note   Assessment Type Final Discharge Note   Anticipated Discharge Disposition retirement Nu   What phone number can be called within the next 1-3 days to see how you are doing after discharge? [phone]   Post-Acute Status   Post-Acute Placement Status Set-up Complete/Auth obtained   Discharge Delays None known at this time     No future appointments.    ZARIA Padron Case Management  [phone]

## 2023-02-16 NOTE — PLAN OF CARE
"RN Case  Order Review      Date:  02/16/2023  Discharging Facility noted: Return to [facility]  Isolation needed:    NA  Med List Reconciled?:   Pending orders  Oxygen:  Room Air    Lines noted:   PIV SL'd to be DC'd Prior to DC.      Peripheral IV - Single Lumen 02/14/23 2203 24 G Left;Posterior Hand  Placement Date/Time: 02/14/23 2203 Inserted by: RN Size/Length: 24 G Orientation: Left;Posterior Location: Hand Placement directed by: Anatomic [facility] Site Prep: Chlorhexidine Local Anesthetic: None Insertion attempts (enter comment if mo...  1 day  Colostomy 10/29/19 1200 LLQ  Placement Date/Time: 10/29/19 1200 Present Prior to Hospital Arrival?: Yes Inserted by: MD Location: LLQ Additional Comments: NO S/S OF COMPLICATIONS  1205 days    LBM:    02/16/2023 Colostomy  Woundcare/Wound vac:  Colostomy care    ABX with end date: NA  Discharging with Line:  Colostomy    Ins auth:   NA    Intake forms completed:   NA    No future appointments.    BP (!) 156/67 (Patient Position: Lying)   Pulse 72   Temp 96.7 °F (35.9 °C) (Oral)   Resp 18   Ht 4' 9" (1.448 m)   Wt 68.2 kg (150 lb 5.7 oz)   LMP  (LMP Unknown)   SpO2 (!) 94%   BMI 32.54 kg/m²        Medication List        START taking these medications      pantoprazole 40 MG tablet  Commonly known as: PROTONIX  Take 1 tablet (40 mg total) by mouth once daily.            CONTINUE taking these medications      acetaminophen 325 MG tablet  Commonly known as: TYLENOL  Take 2 tablets (650 mg total) by mouth every 4 (four) hours as needed for Pain.     apixaban 2.5 mg Tab  Commonly known as: ELIQUIS  Take 1 tablet (2.5 mg total) by mouth 2 (two) times daily.     calcium-vitamin D 600 mg-10 mcg (400 unit) Tab     furosemide 20 MG tablet  Commonly known as: LASIX     levothyroxine 125 MCG tablet  Commonly known as: SYNTHROID  TAKE 1 TABLET (125 MCG TOTAL) BY MOUTH ONCE DAILY.     losartan 50 MG tablet  Commonly known as: COZAAR  Take 0.5 tablets (25 mg total) " by mouth once daily.     magnesium oxide 400 mg (241.3 mg magnesium) tablet  Commonly known as: MAG-OX  Take 2 tablets (800 mg total) by mouth once daily.     metoprolol tartrate 25 MG tablet  Commonly known as: LOPRESSOR  Take 1 tablet (25 mg total) by mouth 2 (two) times daily.     OXcarbazepine 150 MG Tab  Commonly known as: TRILEPTAL     PHENobarbitaL 97.2 MG tablet  Take 1 tablet (97.2 mg total) by mouth every evening.     polyethylene glycol 17 gram Pwpk  Commonly known as: GLYCOLAX  Take 17 g by mouth once daily.     potassium chloride SA 20 MEQ tablet  Commonly known as: K-DUR,KLOR-CON     pravastatin 20 MG tablet  Commonly known as: PRAVACHOL     vitamin D 1000 units Tab  Commonly known as: VITAMIN D3               Where to Get Your Medications        These medications were sent to [facility] - [place] - [id number] [facility] [address] [address] [facility] [address] [address]      Phone: [phone]   pantoprazole 40 MG tablet  PHENobarbitaL 97.2 MG tablet

## 2023-02-16 NOTE — PROGRESS NOTES
[facility] Transfer Orders         Admit to: Nursing home     Diagnoses:          Active Hospital Problems     Diagnosis   POA    *Intractable vomiting [R11.10]   Yes    Hematemesis [K92.0]   Yes    Parastomal hernia with obstruction and without gangrene [K43.3]   Yes    Leukocytosis [D72.829]   Yes    Colostomy present [Z93.3]   Not Applicable    Coronary artery disease [I25.10]   Yes    Atrial fibrillation [I48.91]   Yes    Peripheral vascular disease, unspecified [I73.9]   Yes       Chronic    Hypertension [I10]   Yes    Seizure disorder [G40.909]   Yes       Epilepsy type unknown       Hypothyroidism [E03.9]   Yes       Resolved Hospital Problems   No resolved problems to display.      Allergies:         Review of patient's allergies indicates:   Allergen Reactions    Adhesive Itching and Blisters    Penicillins Anaphylaxis    Tramadol Hives    Avelox [moxifloxacin] Rash       Facial and arm itching and redness. Pt states throat closes when given.    Amoxil [amoxicillin]      Aspridrox [aspirin, buffered]      Codeine Other (See Comments)       Throat swelling    Keflex [cephalexin]         Tolerated cefepime and cefazolin    Norvasc [amlodipine]      Red dye Hives    Robitussin [guaifenesin]      Sulfa (sulfonamide antibiotics)      Tylenol [acetaminophen]         Has reaction to Tylenol with red dye and unable to take Extra Strength Tylenol/ CAN ONLY TOLERATE REG STRENGTH TYLENOL    Vicks vaporub [camphor-eucalyptus oil-menthol]           Code Status: Full     Vitals: Routine       Diet: cardiac diet     Activity: Activity as tolerated     Nursing Precautions: Fall     Bed/Surface: Low Air Loss     Consults: PT to evaluate and treat- 5 times a week and OT to evaluate and treat- 5 times a week     Oxygen: room air     Dialysis: Patient is not on dialysis.         Pending Diagnostic Studies:         None                              Medications: Discontinue all previous medication orders, if  any. See new list below.  Current Discharge Medication List              START taking these medications     Details   pantoprazole (PROTONIX) 40 MG tablet Take 1 tablet (40 mg total) by mouth once daily.  Qty: 90 tablet, Refills: 0                  CONTINUE these medications which have CHANGED     Details   PHENobarbitaL 97.2 MG tablet Take 1 tablet (97.2 mg total) by mouth every evening.  Qty: 30 tablet, Refills: 0                   CONTINUE these medications which have NOT CHANGED     Details   acetaminophen (TYLENOL) 325 MG tablet Take 2 tablets (650 mg total) by mouth every 4 (four) hours as needed for Pain.  Refills: 0       apixaban (ELIQUIS) 2.5 mg Tab Take 1 tablet (2.5 mg total) by mouth 2 (two) times daily.  Qty:         calcium-vitamin D 600 mg(1,500mg) -400 unit Tab Take 1 tablet by mouth once daily.       furosemide (LASIX) 20 MG tablet Take 20 mg by mouth once daily.       levothyroxine (SYNTHROID) 125 MCG tablet TAKE 1 TABLET (125 MCG TOTAL) BY MOUTH ONCE DAILY.  Qty: 90 tablet, Refills: 3       losartan (COZAAR) 50 MG tablet Take 0.5 tablets (25 mg total) by mouth once daily.  Qty: 90 tablet, Refills: 3     Comments: .       magnesium oxide (MAG-OX) 400 mg (241.3 mg magnesium) tablet Take 2 tablets (800 mg total) by mouth once daily.  Refills: 0       metoprolol tartrate (LOPRESSOR) 25 MG tablet Take 1 tablet (25 mg total) by mouth 2 (two) times daily.  Qty: 60 tablet, Refills: 11     Comments: .       OXcarbazepine (TRILEPTAL) 150 MG Tab Take 150 mg by mouth nightly.       polyethylene glycol (GLYCOLAX) 17 gram PwPk Take 17 g by mouth once daily.  Qty: 30 packet, Refills: 5       potassium chloride SA (K-DUR,KLOR-CON) 20 MEQ tablet Take 20 mEq by mouth once daily.       pravastatin (PRAVACHOL) 20 MG tablet Take 20 mg by mouth once daily.       vitamin D (VITAMIN D3) 1000 units Tab Take 1,000 Units by mouth once daily.              Follow up:     Follow-up Information         Jose Valles MD  Follow up in 1 week(s).    Specialty: Family Medicine  Contact information:  [address] [facility] [address]  [phone]                    Keith Chavarria MD Follow up in 2 week(s).    Specialties: Gastroenterology, Hospitalist  Contact information:  [address] [facility]  [address]  [facility] [id number] [phone]                                        Immunizations Administered as of 2/16/2023         No immunizations on file.                     Lucretia Eller NP      Last Modified by Lucretia Eller NP at 2/16/23 8307

## 2023-02-16 NOTE — PLAN OF CARE
Problem: Adult Inpatient Plan of Care  Goal: Plan of Care Review  Outcome: Ongoing, Progressing  Goal: Patient-Specific Goal (Individualized)  Outcome: Ongoing, Progressing  Goal: Absence of Hospital-Acquired Illness or Injury  Outcome: Ongoing, Progressing  Goal: Optimal Comfort and Wellbeing  Outcome: Ongoing, Progressing  Goal: Readiness for Transition of Care  Outcome: Ongoing, Progressing     Problem: Adjustment to Illness (Gastrointestinal Bleeding)  Goal: Optimal Coping with Acute Illness  Outcome: Ongoing, Progressing     Problem: Fall Injury Risk  Goal: Absence of Fall and Fall-Related Injury  Outcome: Ongoing, Progressing     Problem: Skin Injury Risk Increased  Goal: Skin Health and Integrity  Outcome: Ongoing, Progressing

## 2023-02-16 NOTE — PROGRESS NOTES
Ostomy working. No nausea or vomiting. Feels much better. Eating lunch.    Abd soft, ND, NT. Hernia reduced. Ostomy with liquid stool.     Discussed hernia repair vs observation with pt. If this continues to happen would likely need surgery. Will call Daughter as well. No surgical plans right now. Okay to D/c from surgical perspective.     Please call with questions.     Jay Bobby MD  General Surgery, PGY-2  [phone]

## 2023-02-23 NOTE — HOSPITAL COURSE
Patient monitored closely during observation stay. She underwent endoscopy with GI, found to have candidiasis esophagitis, hiatal hernia, erosive gastropathy with clip placed and polyp without resection attempt. Initiated on diflucan IV due to pt's red dye allergy within oral formulation. Protonix x 8 weeks also recommended along with repeat upper endoscopy outpatient.  Monitor H&H. General surgery consulted and suspects hiatal hernia may be the cause of pt's problem and ordered gastrografin.  Contrast clearly in colon, appears to be in ostomy bag. Repeat XR abdomen demonstrates Stable bowel dilatation.  Ostomy is with brown stool noted. She is tolerating clear liquid diet and diet was advanced. She is medically stable for DC from surgical and GI standpoint.

## 2023-02-23 NOTE — DISCHARGE SUMMARY
[facility] Medicine  Discharge Summary      Patient Name: Annette Garcia  MRN: [id number]  STEFANO: [id number]  Patient Class: OP- Observation  Admission Date: 2/14/2023  Hospital Length of Stay: 0 days  Discharge Date and Time: 2/16/2023  3:45 PM  Attending Physician: No att. providers found   Discharging Provider: Lucretia Eller NP  Primary Care Provider: Jose Valles MD    Primary Care Team: Networked reference to record PCT     HPI:   Annette Garcia is an 87 year old female w/ PMH of HTN, seizure disorder, afib on eliquis, LLE DVD, May-Thurner Syndrome, and osteoporosis who presents from [facility] nursing [facility] with concerns for coffee-ground emesis.  Patient has been vomiting since yesterday and apparently was clear yesterday however she states she is vomited about 30-40 times today and had coffee-ground emesis on her jacket that was removed on arrival.  She currently states that she has no nausea and is tolerating ice chips but refusing anything else at this time.  She denies any abdominal pain or tenderness to palpation.  She does have a history of colostomy and states that it was changed yesterday but she has had stool in her colostomy per her normal. Ostomy site is pink and moist w/o irritation or distention.  She denies any fevers/chills.  She denies any dysuria/hematuria dysuria/urinary frequency. UA positive for leukocytes and WBC.    Hospital medicine will admit for intractable N/V and GIB w/u and consult GI.      Procedure(s) (LRB):  EGD (ESOPHAGOGASTRODUODENOSCOPY) (N/A)      Hospital Course:   Patient monitored closely during observation stay. She underwent endoscopy with GI, found to have candidiasis esophagitis, hiatal hernia, erosive gastropathy with clip placed and polyp without resection attempt. Initiated on diflucan IV due to pt's red dye allergy within oral formulation. Protonix x 8 weeks also recommended along with repeat upper endoscopy outpatient.  Monitor H&H.  General surgery consulted and suspects hiatal hernia may be the cause of pt's problem and ordered gastrografin.  Contrast clearly in colon, appears to be in ostomy bag. Repeat XR abdomen demonstrates Stable bowel dilatation.  Ostomy is with brown stool noted. She is tolerating clear liquid diet and diet was advanced. She is medically stable for DC from surgical and GI standpoint.        Goals of Care Treatment Preferences:  Code Status: Full Code    Health care agent: [facility] care agent number: No value filed.    Living Will: Yes              Consults:   Consults (From admission, onward)        Status Ordering Provider     Inpatient consult to Gastroenterology  Once        Provider:  (Not yet assigned)    Completed LES LATIF          Neuro  Seizure disorder  · Continue home phenobarbital and oxcarbazepine  · Maintain safety precautions      Cardiac/Vascular  Coronary artery disease  · Continue home statin  · ASA o/h in the setting of suspected GIB      Atrial fibrillation  Patient with Permanent atrial fibrillation which is controlled currently with Beta Blocker. Patient is currently in atrial fibrillation.[id number] Score: 4. HASBLED Score: 4. Anticoagulation not indicated due to suspected GIB.          Peripheral vascular disease, unspecified  · Continue statin  · ASA o/h in the setting of suspected GIB      Hypertension  · Continue lasix  · Other BP meds o/h in the setting of suspected GIB      Oncology  Leukocytosis  · WBC 12.95  · Afebrile  · UA positive for leukocytes and WBC  · Patient denies symptoms of UTI  · Recheck CBC w/ AM labs to trend WBC  · Abx currently o/h at this time given asymptomatic bacteruria  · May start if symptoms develop or WBC increases      Endocrine  Hypothyroidism  · Takes levothyroxine at home but the levothyroxine we have in-house has red dye in it, which patient has a severe allergy to        GI  * Intractable vomiting  · Per patient, she had 30-40 episodes of vomiting  PTA  · Denies constipation or diarrhea  · Abd CT:   · No convincing significant acute bowel obstruction, transmural inflammation or other acute abnormality involving the GI tract in this patient with vomiting.  There are few loops of borderline caliber small bowel in the mid abdomen at this time without a transition point and this finding is nonspecific.  · Postoperative changes of left lower quadrant colostomy with stable parastomal hernia containing loops of small bowel as described.  No concerning obstructive changes/incarceration seen.  · Again noted is an enterocolic anastomosis in the midline lower abdomen and Sohail's pouch.  · Postoperative changes of cholecystectomy, hysterectomy and left iliac vein stenting as described.  · No convincing acute focal visceral lesions.  · Hiatal hernia.  · Thoracolumbar remote compression fractures, osteopenia as well as spondylosis with lumbar stenoses as above.  · Tolerating ice chips w/o further N/V - denies abd pain  2/15- Underwent endoscopy per GI- candidiasis esophagitis, hiatal hernia, erosive gastropathy with clip placed and polyp without resection attempt.   -Initiated on diflucan IV due to pt's red dye allergy, formulation could be switched to suspension upon discharge  -Protonix x 8 weeks and repeat upper endoscopy for evaluation  -General surgery suspects hiatal hernia may be culprit, gastrografin ordered.       Hematemesis  · POA  · Per patient and ED there was coffee-ground emesis on her shirt on admission  · ASA, eliquis, and antihypertensives o/h at this time  · Protonix BID   · Monitor H/H  · GI consult ordered for the AM      Colostomy present  · Stoma site clean, moist, pink, and w/o signs of infection or irritation  · No distention or tenderness around site        Final Active Diagnoses:    Diagnosis Date Noted POA    PRINCIPAL PROBLEM:  Intractable vomiting [R11.10] 02/15/2023 Yes    Hematemesis [K92.0] 02/15/2023 Yes    Parastomal hernia with  obstruction and without gangrene [K43.3] 02/15/2023 Yes    Leukocytosis [D72.829] 05/21/2022 Yes    Colostomy present [Z93.3] 09/14/2020 Not Applicable    Coronary artery disease [I25.10] 08/14/2020 Yes    Atrial fibrillation [I48.91] 12/28/2019 Yes    Peripheral vascular disease, unspecified [I73.9] 10/28/2019 Yes     Chronic    Hypertension [I10] 12/05/2016 Yes    Seizure disorder [G40.909] 08/18/2016 Yes    Hypothyroidism [E03.9] 08/18/2016 Yes      Problems Resolved During this Admission:       Discharged Condition: stable    Disposition: Another Health Care Inst*    Follow Up:   Follow-up Information     Jose Valles MD Follow up in 1 week(s).    Specialty: Family Medicine  Contact information:  [address] [place] [address]  [phone]             Keith Chavarria MD Follow up in 2 week(s).    Specialties: Gastroenterology, Hospitalist  Contact information:  [address] [facility] [address] [facility]  [address]  [facility] [address]  [phone]             Chris Johnson MD Follow up in 2 week(s).    Specialties: Surgery, General Surgery  Contact information:  [address]  [address]  [facility] [id number] [phone]                       Patient Instructions:      Diet Cardiac     Activity as tolerated       Significant Diagnostic Studies: Labs: hgb 10.2    Pending Diagnostic Studies:     None         Medications:  Reconciled Home Medications:      Medication List      START taking these medications    pantoprazole 40 MG tablet  Commonly known as: PROTONIX  Take 1 tablet (40 mg total) by mouth once daily.        CONTINUE taking these medications    acetaminophen 325 MG tablet  Commonly known as: TYLENOL  Take 2 tablets (650 mg total) by mouth every 4 (four) hours as needed for Pain.     apixaban 2.5 mg Tab  Commonly known as: ELIQUIS  Take 1 tablet (2.5 mg total) by mouth 2 (two) times daily.     calcium-vitamin D 600 mg-10 mcg (400 unit) Tab  Take 1 tablet by mouth once daily.     furosemide 20 MG  tablet  Commonly known as: LASIX  Take 20 mg by mouth once daily.     levothyroxine 125 MCG tablet  Commonly known as: SYNTHROID  TAKE 1 TABLET (125 MCG TOTAL) BY MOUTH ONCE DAILY.     losartan 50 MG tablet  Commonly known as: COZAAR  Take 0.5 tablets (25 mg total) by mouth once daily.     magnesium oxide 400 mg (241.3 mg magnesium) tablet  Commonly known as: MAG-OX  Take 2 tablets (800 mg total) by mouth once daily.     metoprolol tartrate 25 MG tablet  Commonly known as: LOPRESSOR  Take 1 tablet (25 mg total) by mouth 2 (two) times daily.     OXcarbazepine 150 MG Tab  Commonly known as: TRILEPTAL  Take 150 mg by mouth nightly.     PHENobarbitaL 97.2 MG tablet  Take 1 tablet (97.2 mg total) by mouth every evening.     polyethylene glycol 17 gram Pwpk  Commonly known as: GLYCOLAX  Take 17 g by mouth once daily.     potassium chloride SA 20 MEQ tablet  Commonly known as: K-DUR,KLOR-CON  Take 20 mEq by mouth once daily.     pravastatin 20 MG tablet  Commonly known as: PRAVACHOL  Take 20 mg by mouth once daily.     vitamin D 1000 units Tab  Commonly known as: VITAMIN D3  Take 1,000 Units by mouth once daily.            Indwelling Lines/Drains at time of discharge:   Lines/Drains/Airways     Drain  Duration                Colostomy 10/29/19 1200 LLQ 1212 days                Time spent on the discharge of patient: 45 minutes         Lucretia Eller NP  Department of Hospital Medicine  [facility]

## 2023-02-27 ENCOUNTER — OFFICE VISIT (OUTPATIENT)
Dept: SURGERY | Facility: CLINIC | Age: 88
End: 2023-02-27
Payer: MEDICARE

## 2023-02-27 VITALS
SYSTOLIC BLOOD PRESSURE: 115 MMHG | DIASTOLIC BLOOD PRESSURE: 70 MMHG | WEIGHT: 150.38 LBS | BODY MASS INDEX: 32.44 KG/M2 | HEART RATE: 73 BPM | HEIGHT: 57 IN

## 2023-02-27 DIAGNOSIS — K43.5 PARASTOMAL HERNIA WITHOUT OBSTRUCTION OR GANGRENE: Primary | ICD-10-CM

## 2023-02-27 PROCEDURE — 99214 PR OFFICE/OUTPT VISIT, EST, LEVL IV, 30-39 MIN: ICD-10-PCS | Mod: S$PBB,,, | Performed by: STUDENT IN AN ORGANIZED HEALTH CARE EDUCATION/TRAINING PROGRAM

## 2023-02-27 PROCEDURE — 99999 PR PBB SHADOW E&M-EST. PATIENT-LVL III: ICD-10-PCS | Mod: PBBFAC,,, | Performed by: STUDENT IN AN ORGANIZED HEALTH CARE EDUCATION/TRAINING PROGRAM

## 2023-02-27 PROCEDURE — 99213 OFFICE O/P EST LOW 20 MIN: CPT | Mod: PBBFAC,PO | Performed by: STUDENT IN AN ORGANIZED HEALTH CARE EDUCATION/TRAINING PROGRAM

## 2023-02-27 PROCEDURE — 99214 OFFICE O/P EST MOD 30 MIN: CPT | Mod: S$PBB,,, | Performed by: STUDENT IN AN ORGANIZED HEALTH CARE EDUCATION/TRAINING PROGRAM

## 2023-02-27 PROCEDURE — 99999 PR PBB SHADOW E&M-EST. PATIENT-LVL III: CPT | Mod: PBBFAC,,, | Performed by: STUDENT IN AN ORGANIZED HEALTH CARE EDUCATION/TRAINING PROGRAM

## 2023-03-04 NOTE — PROGRESS NOTES
Patient ID: Annette Garcai is a 87 y.o. female.    Chief Complaint: No chief complaint on file.      HPI:  HPI  87F recently admitted for NV, found to have recurrent parastomal hernia. Able to reduce and her diet was advanced. Does have pain at ostomy site but working well, no NV in the past few days. Some decreased appetite. Hx ofafib, opn eliquis, DVT in 2020 left iliac vein.      Review of Systems   Constitutional:  Negative for fever.   HENT:  Negative for trouble swallowing.    Respiratory:  Negative for shortness of breath.    Cardiovascular:  Negative for chest pain.   Gastrointestinal:  Positive for abdominal pain. Negative for blood in stool, nausea and vomiting.   Genitourinary:  Negative for dysuria.   Musculoskeletal:  Negative for gait problem.   Skin:  Negative for rash and wound.   Allergic/Immunologic: Negative for immunocompromised state.   Neurological:  Negative for weakness.   Hematological:  Does not bruise/bleed easily.   Psychiatric/Behavioral:  Negative for agitation.      Current Outpatient Medications   Medication Sig Dispense Refill    acetaminophen (TYLENOL) 325 MG tablet Take 2 tablets (650 mg total) by mouth every 4 (four) hours as needed for Pain.  0    apixaban (ELIQUIS) 2.5 mg Tab Take 1 tablet (2.5 mg total) by mouth 2 (two) times daily.      calcium-vitamin D 600 mg(1,500mg) -400 unit Tab Take 1 tablet by mouth once daily.      furosemide (LASIX) 20 MG tablet Take 20 mg by mouth once daily.      levothyroxine (SYNTHROID) 125 MCG tablet TAKE 1 TABLET (125 MCG TOTAL) BY MOUTH ONCE DAILY. 90 tablet 3    losartan (COZAAR) 50 MG tablet Take 0.5 tablets (25 mg total) by mouth once daily. 90 tablet 3    magnesium oxide (MAG-OX) 400 mg (241.3 mg magnesium) tablet Take 2 tablets (800 mg total) by mouth once daily.  0    metoprolol tartrate (LOPRESSOR) 25 MG tablet Take 1 tablet (25 mg total) by mouth 2 (two) times daily. 60 tablet 11    OXcarbazepine (TRILEPTAL) 150 MG Tab Take 150 mg  by mouth nightly.      pantoprazole (PROTONIX) 40 MG tablet Take 1 tablet (40 mg total) by mouth once daily. 90 tablet 0    PHENobarbitaL 97.2 MG tablet Take 1 tablet (97.2 mg total) by mouth every evening. 30 tablet 0    polyethylene glycol (GLYCOLAX) 17 gram PwPk Take 17 g by mouth once daily. 30 packet 5    potassium chloride SA (K-DUR,KLOR-CON) 20 MEQ tablet Take 20 mEq by mouth once daily.      pravastatin (PRAVACHOL) 20 MG tablet Take 20 mg by mouth once daily.      vitamin D (VITAMIN D3) 1000 units Tab Take 1,000 Units by mouth once daily.       No current facility-administered medications for this visit.       Review of patient's allergies indicates:   Allergen Reactions    Adhesive Itching and Blisters    Penicillins Anaphylaxis    Tramadol Hives    Avelox [moxifloxacin] Rash     Facial and arm itching and redness. Pt states throat closes when given.    Amoxil [amoxicillin]     Aspridrox [aspirin, buffered]     Codeine Other (See Comments)     Throat swelling    Keflex [cephalexin]      Tolerated cefepime and cefazolin    Norvasc [amlodipine]     Red dye Hives    Robitussin [guaifenesin]     Sulfa (sulfonamide antibiotics)     Tylenol [acetaminophen]      Has reaction to Tylenol with red dye and unable to take Extra Strength Tylenol/ CAN ONLY TOLERATE REG STRENGTH TYLENOL    Vicks vaporub [camphor-eucalyptus oil-menthol]        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       Past Surgical History:   Procedure Laterality Date     APPENDECTOMY      BACK SURGERY  1988    vertebral fracture    BACK SURGERY  02/2013    lumbar L2-5    CATARACT EXTRACTION, BILATERAL Bilateral     CHOLECYSTECTOMY      open    ESOPHAGOGASTRODUODENOSCOPY N/A 2/15/2023    Procedure: EGD (ESOPHAGOGASTRODUODENOSCOPY);  Surgeon: Keith Chavarria MD;  Location: [facility] ENDO;  Service: Endoscopy;  Laterality: N/A;    EYE SURGERY Bilateral     cataract removal with lens implant    HYSTERECTOMY      PERCUTANEOUS TRANSLUMINAL ANGIOPLASTY (PTA) OF PERIPHERAL VESSEL N/A 2/3/2020    Procedure: PTA, PERIPHERAL VESSEL;  Surgeon: Timmy Simmons MD;  Location: [facility] CATH LAB/EP;  Service: Cardiology;  Laterality: N/A;    PORTACATH PLACEMENT Right 09/2016    RENAL ARTERY STENT Left 07/19/2017    SIGMOIDECTOMY  10/29/2019    SMALL INTESTINE SURGERY  08/23/2016    THROMBECTOMY N/A 2/3/2020    Procedure: THROMBECTOMY;  Surgeon: Timmy Simmons MD;  Location: [facility] CATH LAB/EP;  Service: Cardiology;  Laterality: N/A;    TONSILLECTOMY      VAGINAL HYSTERECTOMY W/ ANTERIOR AND POSTERIOR VAGINAL REPAIR         Family History   Problem Relation Age of Onset    Pancreatic cancer Mother     Hypertension Father     Heart attack Father        Social History     Socioeconomic History    Marital status:    Tobacco Use    Smoking status: Never    Smokeless tobacco: Never   Substance and Sexual Activity    Alcohol use: No    Drug use: No     Social Determinants of Health     Financial Resource Strain: Low Risk     Difficulty of Paying Living Expenses: Not hard at all   Food Insecurity: No Food Insecurity    Worried About Running Out of Food in the Last Year: Never true    Ran Out of Food in the Last Year: Never true   Transportation Needs: No Transportation Needs    Lack of Transportation (Medical): No    Lack of Transportation (Non-Medical): No   Physical Activity: Inactive    Days of Exercise per Week: 0 days    Minutes of Exercise per Session: 0 min   Stress: No Stress Concern  Present    Feeling of Stress : Not at all   Housing Stability: Low Risk     Unable to Pay for Housing in the Last Year: No    Number of Places Lived in the Last Year: 1    Unstable Housing in the Last Year: No       Vitals:    02/27/23 0937   BP: 115/70   Pulse: 73       Physical Exam  Constitutional:       General: She is not in acute distress.     Appearance: She is well-developed.   HENT:      Head: Normocephalic and atraumatic.   Eyes:      General: No scleral icterus.  Cardiovascular:      Rate and Rhythm: Normal rate.   Pulmonary:      Effort: Pulmonary effort is normal.      Breath sounds: No stridor.   Abdominal:      General: There is no distension.      Palpations: Abdomen is soft.      Tenderness: There is no abdominal tenderness.      Hernia: A hernia is present.   Lymphadenopathy:      Cervical: No cervical adenopathy.   Skin:     General: Skin is warm.      Findings: No erythema.   Neurological:      Mental Status: She is alert and oriented to person, place, and time.   Psychiatric:         Behavior: Behavior normal.   Body mass index is 32.54 kg/m².  CT shows recurrent parastomal hernia  Albumin 4.1    Assessment & Plan:   87F with parastomal hernia, recurrent  Would need lap repair  Cardiac echo in 2020 EF 55%, last saw dr bales in 2020 for afib, DVT, leg edema  Would have to stop eliquis for 2 days, ppx dose lovenox preop  Patient desires repair to avoid future episodes of this  Will ask dr bales to weigh in and help risk stratify. If at least acceptable risk for 1-2 general anesthesia laparoscopic case will plan for robot parastomal hernia repair

## 2023-03-09 ENCOUNTER — PATIENT MESSAGE (OUTPATIENT)
Dept: SURGERY | Facility: CLINIC | Age: 88
End: 2023-03-09
Payer: MEDICARE

## 2023-03-15 ENCOUNTER — TELEPHONE (OUTPATIENT)
Dept: CARDIOLOGY | Facility: CLINIC | Age: 88
End: 2023-03-15
Payer: MEDICARE

## 2023-03-15 NOTE — TELEPHONE ENCOUNTER
Spoke w pt's daughter Beatris and she states pt is currently in nursing home and she needs to call nursing home so they can call us to have appt scheduled since they need to arrange transportation for her       V/a and will give nursing home our office #          JA

## 2023-03-15 NOTE — TELEPHONE ENCOUNTER
----- Message from Timmy Simmons MD sent at 3/15/2023  3:02 PM CDT -----  Please schedule appt for cardiac clearance  ALEX

## 2023-03-16 ENCOUNTER — TELEPHONE (OUTPATIENT)
Dept: CARDIOLOGY | Facility: CLINIC | Age: 88
End: 2023-03-16
Payer: MEDICARE

## 2023-03-16 NOTE — TELEPHONE ENCOUNTER
Pt's daughter wanted to get pt's appt scheduled she can do 3/21/23 since pt is in nursing home will call back tomorrow morning to confirm date and time           ja

## 2023-03-16 NOTE — TELEPHONE ENCOUNTER
----- Message from Zabrina Lazo sent at 3/16/2023  8:17 AM CDT -----  Type:  Patient Returning Call    Who Called:pt's daughter manpreet  Who Left Message for Patient: jory  Does the patient know what this is regarding?:  Would the patient rather a call back or a response via [facility]?   Best Call Back Number:[phone]  Additional Information:

## 2023-03-16 NOTE — NURSING
Discharge education reviewed with patient and report given to nurse at nursing home.   Topical Sulfur Applications Counseling: Topical Sulfur Counseling: Patient counseled that this medication may cause skin irritation or allergic reactions.  In the event of skin irritation, the patient was advised to reduce the amount of the drug applied or use it less frequently.   The patient verbalized understanding of the proper use and possible adverse effects of topical sulfur application.  All of the patient's questions and concerns were addressed.

## 2023-03-17 NOTE — TELEPHONE ENCOUNTER
----- Message from Shara Thompson sent at 3/16/2023  2:32 PM CDT -----  Type:  Needs Medical Advice    Who Called: pt daughter  Symptoms (please be specific): pt  needs to schedule a appointment for surgery clearance        Would the patient rather a call back or a response via [facility]? call  Best Call Back Number: [phone]  Additional Information:         
----- Message from Zabrina Lazo sent at 3/17/2023  9:07 AM CDT -----  Type:  Needs Medical Advice    Who Called: Pt's daughter Beatris  Symptoms (please be specific):    How long has patient had these symptoms:    Pharmacy name and phone #:    Would the patient rather a call back or a response via [facility]?   Best Call Back Number: [phone]  Additional Information: pt was told to call back and speak with Lucretia about being work in        
Pt scheduled for Tuesday 3/21/23 @ 1pm   Pt's daughter will call nursing nurse and confirm         V/a          JA   
Yes

## 2023-03-21 ENCOUNTER — OFFICE VISIT (OUTPATIENT)
Dept: CARDIOLOGY | Facility: CLINIC | Age: 88
End: 2023-03-21
Payer: MEDICARE

## 2023-03-21 VITALS
HEIGHT: 57 IN | DIASTOLIC BLOOD PRESSURE: 84 MMHG | BODY MASS INDEX: 31.28 KG/M2 | HEART RATE: 83 BPM | SYSTOLIC BLOOD PRESSURE: 121 MMHG | OXYGEN SATURATION: 94 % | WEIGHT: 145 LBS

## 2023-03-21 DIAGNOSIS — Z01.818 ENCOUNTER FOR OTHER PREPROCEDURAL EXAMINATION: Primary | ICD-10-CM

## 2023-03-21 DIAGNOSIS — R06.02 SHORTNESS OF BREATH: ICD-10-CM

## 2023-03-21 DIAGNOSIS — K43.5 PARASTOMAL HERNIA WITHOUT OBSTRUCTION OR GANGRENE: ICD-10-CM

## 2023-03-21 DIAGNOSIS — Z01.810 ENCOUNTER FOR PREPROCEDURAL CARDIOVASCULAR EXAMINATION: ICD-10-CM

## 2023-03-21 PROCEDURE — 99215 OFFICE O/P EST HI 40 MIN: CPT | Mod: S$PBB,,, | Performed by: INTERNAL MEDICINE

## 2023-03-21 PROCEDURE — 99999 PR PBB SHADOW E&M-EST. PATIENT-LVL IV: ICD-10-PCS | Mod: PBBFAC,,, | Performed by: INTERNAL MEDICINE

## 2023-03-21 PROCEDURE — 99214 OFFICE O/P EST MOD 30 MIN: CPT | Mod: PBBFAC,PO | Performed by: INTERNAL MEDICINE

## 2023-03-21 PROCEDURE — 99215 PR OFFICE/OUTPT VISIT, EST, LEVL V, 40-54 MIN: ICD-10-PCS | Mod: S$PBB,,, | Performed by: INTERNAL MEDICINE

## 2023-03-21 PROCEDURE — 99999 PR PBB SHADOW E&M-EST. PATIENT-LVL IV: CPT | Mod: PBBFAC,,, | Performed by: INTERNAL MEDICINE

## 2023-03-21 NOTE — PROGRESS NOTES
Subjective:    Patient ID:  Annette Garcia is a 87 y.o. female who presents for follow-up of Pre-op Exam      HPI    88 y/o female with hx DVT, of difficult to control HTN (previously on a diuretic but dicsontinued due to recurrent dehydration - has had prior visits admitting to dietary indiscretion with high sodium diet including Nicole's Pie, Corn bisque, and onion rings previously was V8, canned foods, TV dinners, soft drinks, gumbo, chinese food), renal artery stenosis (LRA severe ostial disease s/p YOLANDA, RRA with mild-mod disease), anemia, acquired hypothyroidism, diffuse large B-cell lymphoma previously on chemo. She presents for hospital f/u.  Was hospitalized for abdominal pain, found to have bowel obstruction, had surgical intervention, developed septic shock requiring intubation, developed JAS, developed afib with sinus pauses, TVP placed, pt slowly improved, extubated, and eventually discharged to NH. Re-admitted for syncopal episode, found to be orthostatic, clinically improved, and discharged back to NH.  Currently mostly wchr bound due to debility post hospitalization. Has gonzales and following with Urology. Followed with Surgery and plan is to keep ostomy longterm.   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 Did well until recently.  Has had multiple syncopal episodes with at least 3-4 over the last year. Most recent episode admitted to hospital, had recently developed LLE edema and pain, and doppler with recurrence of ilio-fem-pop DVT. Continues on Eliquis and has knee high stockings on. States leg does not hurt and has not increased in size recently. No non healing ulceration or signs of limb ischemia. Syncopal episodes are without prodrome. Had an episode in 3/2020 attributed to hypotension. She is mostly  wchr bound and does not receive much phys therapy at NH.    3/21/2023:  Not seen since 2020. Continues at NH, she is mostly wchr bound and does not receive much phys therapy at NH. Unable to perform ADL's (getting dressed, etc, <4 METS) without assistance for fall precautions. Denies CP, orthopnea, PND, syncope, recent falls. Has chronic, mild SOB. Compliant with meds. Chronic bilateral LE edema.    Review of Systems   Constitutional: Positive for malaise/fatigue.   HENT:  Negative for congestion.    Eyes:  Negative for blurred vision.   Cardiovascular:  Positive for dyspnea on exertion and leg swelling. Negative for chest pain, claudication, cyanosis, irregular heartbeat, near-syncope, orthopnea, palpitations, paroxysmal nocturnal dyspnea and syncope.   Respiratory:  Negative for shortness of breath.    Endocrine: Negative for polyuria.   Hematologic/Lymphatic: Negative for bleeding problem.   Skin:  Negative for itching and rash.   Musculoskeletal:  Positive for muscle weakness. Negative for joint swelling and muscle cramps.   Gastrointestinal:  Negative for abdominal pain, hematemesis, hematochezia, melena, nausea and vomiting.   Genitourinary:  Negative for dysuria and hematuria.   Neurological:  Positive for weakness. Negative for dizziness, focal weakness, headaches, light-headedness and loss of balance.   Psychiatric/Behavioral:  Negative for depression. The patient is not nervous/anxious.       Objective:    Physical Exam  Constitutional:       Appearance: She is well-developed.   HENT:      Head: Normocephalic and atraumatic.   Neck:      Vascular: No JVD.   Cardiovascular:      Rate and Rhythm: Normal rate and regular rhythm.      Pulses:           Carotid pulses are 2+ on the right side and 2+ on the left side.       Radial pulses are 2+ on the right side and 2+ on the left side.        Femoral pulses are 2+ on the right side and 2+ on the left side.       Dorsalis pedis pulses are 2+ on the right side  and 2+ on the left side.        Posterior tibial pulses are 1+ on the right side and 1+ on the left side.      Heart sounds: Normal heart sounds.   Pulmonary:      Effort: Pulmonary effort is normal.      Breath sounds: Normal breath sounds.   Musculoskeletal:      Cervical back: Neck supple.      Right lower leg: Edema present.      Left lower leg: Edema present.   Skin:     General: Skin is warm and dry.   Neurological:      Mental Status: She is alert and oriented to person, place, and time.   Psychiatric:         Behavior: Behavior normal.         Thought Content: Thought content normal.         Assessment:       1. Encounter for other preprocedural examination    2. Encounter for preprocedural cardiovascular examination    3. Shortness of breath      86 y/o pt with hx and presentation as above. Here for oleg-operative cardiac eval prior to hernia surgery. Unclear cardiac symptoms, as she is not active and mostly wchr bound. Will obtain nuclear stress to risk stratify. 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 We had an extensive discussion regarding her current condition and poor long term prognosis. I recommend no further invasive procedures (I.e. venogram/angiogram for DVT). I mentioned also the possible findings on monitor which may prompt pacemaker recommendation and we will address this at a later date if necessary. Continue DOAC. Compression stockings - needs waist high. Needs to be more active and should be receiving physical therapy regularly. Discussed the etiology, evaluation, and management of syncope, DVT, DOAC, Afib, May Thurner, HTN, OA, anemia. Discussed the importance of med compliance, heart healthy diet, and regular exercise.      Plan:       -Nuclear SPECT  -f/u phone review  -f/u in 8 months

## 2023-03-22 ENCOUNTER — HOSPITAL ENCOUNTER (INPATIENT)
Facility: HOSPITAL | Age: 88
LOS: 6 days | Discharge: NURSING, REHAB OR LONG-TERM CARE | DRG: 354 | End: 2023-03-28
Attending: STUDENT IN AN ORGANIZED HEALTH CARE EDUCATION/TRAINING PROGRAM | Admitting: EMERGENCY MEDICINE
Payer: MEDICARE

## 2023-03-22 DIAGNOSIS — N30.00 ACUTE CYSTITIS WITHOUT HEMATURIA: ICD-10-CM

## 2023-03-22 DIAGNOSIS — K56.609 SBO (SMALL BOWEL OBSTRUCTION): ICD-10-CM

## 2023-03-22 DIAGNOSIS — K43.3 PARASTOMAL HERNIA WITH OBSTRUCTION AND WITHOUT GANGRENE: Primary | ICD-10-CM

## 2023-03-22 DIAGNOSIS — R10.9 ABDOMINAL PAIN: ICD-10-CM

## 2023-03-22 DIAGNOSIS — Z46.59 ENCOUNTER FOR NASOGASTRIC (NG) TUBE PLACEMENT: ICD-10-CM

## 2023-03-22 LAB
ALBUMIN SERPL BCP-MCNC: 4.3 G/DL (ref 3.5–5.2)
ALP SERPL-CCNC: 99 U/L (ref 55–135)
ALT SERPL W/O P-5'-P-CCNC: 12 U/L (ref 10–44)
ANION GAP SERPL CALC-SCNC: 15 MMOL/L (ref 8–16)
AST SERPL-CCNC: 18 U/L (ref 10–40)
BACTERIA #/AREA URNS HPF: ABNORMAL /HPF
BASOPHILS # BLD AUTO: 0.02 K/UL (ref 0–0.2)
BASOPHILS NFR BLD: 0.2 % (ref 0–1.9)
BILIRUB SERPL-MCNC: 0.3 MG/DL (ref 0.1–1)
BILIRUB UR QL STRIP: NEGATIVE
BUN SERPL-MCNC: 14 MG/DL (ref 8–23)
CALCIUM SERPL-MCNC: 10.2 MG/DL (ref 8.7–10.5)
CHLORIDE SERPL-SCNC: 93 MMOL/L (ref 95–110)
CLARITY UR: ABNORMAL
CO2 SERPL-SCNC: 23 MMOL/L (ref 23–29)
COLOR UR: YELLOW
CREAT SERPL-MCNC: 0.9 MG/DL (ref 0.5–1.4)
DIFFERENTIAL METHOD: ABNORMAL
EOSINOPHIL # BLD AUTO: 0.1 K/UL (ref 0–0.5)
EOSINOPHIL NFR BLD: 0.6 % (ref 0–8)
ERYTHROCYTE [DISTWIDTH] IN BLOOD BY AUTOMATED COUNT: 13.4 % (ref 11.5–14.5)
EST. GFR  (NO RACE VARIABLE): >60 ML/MIN/1.73 M^2
GLUCOSE SERPL-MCNC: 137 MG/DL (ref 70–110)
GLUCOSE UR QL STRIP: NEGATIVE
HCT VFR BLD AUTO: 38.6 % (ref 37–48.5)
HGB BLD-MCNC: 13 G/DL (ref 12–16)
HGB UR QL STRIP: ABNORMAL
IMM GRANULOCYTES # BLD AUTO: 0.03 K/UL (ref 0–0.04)
IMM GRANULOCYTES NFR BLD AUTO: 0.3 % (ref 0–0.5)
KETONES UR QL STRIP: NEGATIVE
LACTATE SERPL-SCNC: 1.7 MMOL/L (ref 0.5–2.2)
LACTATE SERPL-SCNC: 2.3 MMOL/L (ref 0.5–2.2)
LEUKOCYTE ESTERASE UR QL STRIP: ABNORMAL
LIPASE SERPL-CCNC: 11 U/L (ref 4–60)
LYMPHOCYTES # BLD AUTO: 1.8 K/UL (ref 1–4.8)
LYMPHOCYTES NFR BLD: 16.1 % (ref 18–48)
MCH RBC QN AUTO: 32.3 PG (ref 27–31)
MCHC RBC AUTO-ENTMCNC: 33.7 G/DL (ref 32–36)
MCV RBC AUTO: 96 FL (ref 82–98)
MICROSCOPIC COMMENT: ABNORMAL
MONOCYTES # BLD AUTO: 0.6 K/UL (ref 0.3–1)
MONOCYTES NFR BLD: 5.2 % (ref 4–15)
NEUTROPHILS # BLD AUTO: 8.9 K/UL (ref 1.8–7.7)
NEUTROPHILS NFR BLD: 77.6 % (ref 38–73)
NITRITE UR QL STRIP: NEGATIVE
NRBC BLD-RTO: 0 /100 WBC
PH UR STRIP: 7 [PH] (ref 5–8)
PLATELET # BLD AUTO: 211 K/UL (ref 150–450)
PMV BLD AUTO: 10 FL (ref 9.2–12.9)
POTASSIUM SERPL-SCNC: 4.6 MMOL/L (ref 3.5–5.1)
PROT SERPL-MCNC: 7.9 G/DL (ref 6–8.4)
PROT UR QL STRIP: ABNORMAL
RBC # BLD AUTO: 4.03 M/UL (ref 4–5.4)
RBC #/AREA URNS HPF: 7 /HPF (ref 0–4)
SODIUM SERPL-SCNC: 131 MMOL/L (ref 136–145)
SP GR UR STRIP: 1.01 (ref 1–1.03)
SQUAMOUS #/AREA URNS HPF: 2 /HPF
URN SPEC COLLECT METH UR: ABNORMAL
UROBILINOGEN UR STRIP-ACNC: NEGATIVE EU/DL
WBC # BLD AUTO: 11.42 K/UL (ref 3.9–12.7)
WBC #/AREA URNS HPF: >100 /HPF (ref 0–5)

## 2023-03-22 PROCEDURE — 25000003 PHARM REV CODE 250: Performed by: STUDENT IN AN ORGANIZED HEALTH CARE EDUCATION/TRAINING PROGRAM

## 2023-03-22 PROCEDURE — 80053 COMPREHEN METABOLIC PANEL: CPT | Performed by: STUDENT IN AN ORGANIZED HEALTH CARE EDUCATION/TRAINING PROGRAM

## 2023-03-22 PROCEDURE — 51798 US URINE CAPACITY MEASURE: CPT

## 2023-03-22 PROCEDURE — 87088 URINE BACTERIA CULTURE: CPT | Performed by: STUDENT IN AN ORGANIZED HEALTH CARE EDUCATION/TRAINING PROGRAM

## 2023-03-22 PROCEDURE — 81000 URINALYSIS NONAUTO W/SCOPE: CPT | Performed by: STUDENT IN AN ORGANIZED HEALTH CARE EDUCATION/TRAINING PROGRAM

## 2023-03-22 PROCEDURE — 85025 COMPLETE CBC W/AUTO DIFF WBC: CPT | Performed by: STUDENT IN AN ORGANIZED HEALTH CARE EDUCATION/TRAINING PROGRAM

## 2023-03-22 PROCEDURE — 63600175 PHARM REV CODE 636 W HCPCS: Performed by: STUDENT IN AN ORGANIZED HEALTH CARE EDUCATION/TRAINING PROGRAM

## 2023-03-22 PROCEDURE — 83690 ASSAY OF LIPASE: CPT | Performed by: STUDENT IN AN ORGANIZED HEALTH CARE EDUCATION/TRAINING PROGRAM

## 2023-03-22 PROCEDURE — 93005 ELECTROCARDIOGRAM TRACING: CPT

## 2023-03-22 PROCEDURE — 11000001 HC ACUTE MED/SURG PRIVATE ROOM

## 2023-03-22 PROCEDURE — 99285 EMERGENCY DEPT VISIT HI MDM: CPT | Mod: 25

## 2023-03-22 PROCEDURE — 99222 PR INITIAL HOSPITAL CARE,LEVL II: ICD-10-PCS | Mod: 57,,, | Performed by: STUDENT IN AN ORGANIZED HEALTH CARE EDUCATION/TRAINING PROGRAM

## 2023-03-22 PROCEDURE — 99222 1ST HOSP IP/OBS MODERATE 55: CPT | Mod: 57,,, | Performed by: STUDENT IN AN ORGANIZED HEALTH CARE EDUCATION/TRAINING PROGRAM

## 2023-03-22 PROCEDURE — 87077 CULTURE AEROBIC IDENTIFY: CPT | Performed by: STUDENT IN AN ORGANIZED HEALTH CARE EDUCATION/TRAINING PROGRAM

## 2023-03-22 PROCEDURE — 93010 EKG 12-LEAD: ICD-10-PCS | Mod: ,,, | Performed by: INTERNAL MEDICINE

## 2023-03-22 PROCEDURE — 96374 THER/PROPH/DIAG INJ IV PUSH: CPT | Mod: 59

## 2023-03-22 PROCEDURE — 87186 SC STD MICRODIL/AGAR DIL: CPT | Performed by: STUDENT IN AN ORGANIZED HEALTH CARE EDUCATION/TRAINING PROGRAM

## 2023-03-22 PROCEDURE — 87086 URINE CULTURE/COLONY COUNT: CPT | Performed by: STUDENT IN AN ORGANIZED HEALTH CARE EDUCATION/TRAINING PROGRAM

## 2023-03-22 PROCEDURE — 25500020 PHARM REV CODE 255: Performed by: STUDENT IN AN ORGANIZED HEALTH CARE EDUCATION/TRAINING PROGRAM

## 2023-03-22 PROCEDURE — 83605 ASSAY OF LACTIC ACID: CPT | Mod: 91 | Performed by: STUDENT IN AN ORGANIZED HEALTH CARE EDUCATION/TRAINING PROGRAM

## 2023-03-22 PROCEDURE — 93010 ELECTROCARDIOGRAM REPORT: CPT | Mod: ,,, | Performed by: INTERNAL MEDICINE

## 2023-03-22 PROCEDURE — 96365 THER/PROPH/DIAG IV INF INIT: CPT

## 2023-03-22 PROCEDURE — 96361 HYDRATE IV INFUSION ADD-ON: CPT

## 2023-03-22 RX ORDER — LOSARTAN POTASSIUM 25 MG/1
25 TABLET ORAL DAILY
Status: DISCONTINUED | OUTPATIENT
Start: 2023-03-22 | End: 2023-03-22

## 2023-03-22 RX ORDER — LIDOCAINE HYDROCHLORIDE 20 MG/ML
5 SOLUTION OROPHARYNGEAL
Status: ACTIVE | OUTPATIENT
Start: 2023-03-22 | End: 2023-03-22

## 2023-03-22 RX ORDER — SODIUM CHLORIDE 9 MG/ML
500 INJECTION, SOLUTION INTRAVENOUS
Status: COMPLETED | OUTPATIENT
Start: 2023-03-22 | End: 2023-03-22

## 2023-03-22 RX ORDER — METOPROLOL TARTRATE 25 MG/1
25 TABLET, FILM COATED ORAL 2 TIMES DAILY
Status: DISCONTINUED | OUTPATIENT
Start: 2023-03-22 | End: 2023-03-22

## 2023-03-22 RX ORDER — ONDANSETRON 2 MG/ML
8 INJECTION INTRAMUSCULAR; INTRAVENOUS EVERY 6 HOURS PRN
Status: DISCONTINUED | OUTPATIENT
Start: 2023-03-22 | End: 2023-03-24

## 2023-03-22 RX ORDER — ONDANSETRON 4 MG/1
4 TABLET, ORALLY DISINTEGRATING ORAL
Status: COMPLETED | OUTPATIENT
Start: 2023-03-22 | End: 2023-03-22

## 2023-03-22 RX ORDER — FUROSEMIDE 20 MG/1
20 TABLET ORAL DAILY
Status: DISCONTINUED | OUTPATIENT
Start: 2023-03-22 | End: 2023-03-22

## 2023-03-22 RX ORDER — ENOXAPARIN SODIUM 100 MG/ML
40 INJECTION SUBCUTANEOUS EVERY 24 HOURS
Status: DISCONTINUED | OUTPATIENT
Start: 2023-03-22 | End: 2023-03-24

## 2023-03-22 RX ORDER — OXCARBAZEPINE 150 MG/1
150 TABLET, FILM COATED ORAL NIGHTLY
Status: DISCONTINUED | OUTPATIENT
Start: 2023-03-22 | End: 2023-03-28 | Stop reason: HOSPADM

## 2023-03-22 RX ORDER — ONDANSETRON HCL IN 0.9 % NACL 8 MG/50 ML
8 INTRAVENOUS SOLUTION, PIGGYBACK (ML) INTRAVENOUS EVERY 6 HOURS PRN
Status: DISCONTINUED | OUTPATIENT
Start: 2023-03-22 | End: 2023-03-22

## 2023-03-22 RX ORDER — PHENOBARBITAL 97.2 MG/1
97.2 TABLET ORAL NIGHTLY
COMMUNITY

## 2023-03-22 RX ORDER — METOPROLOL TARTRATE 1 MG/ML
5 INJECTION, SOLUTION INTRAVENOUS EVERY 6 HOURS
Status: DISCONTINUED | OUTPATIENT
Start: 2023-03-22 | End: 2023-03-24

## 2023-03-22 RX ORDER — HYDRALAZINE HYDROCHLORIDE 20 MG/ML
10 INJECTION INTRAMUSCULAR; INTRAVENOUS EVERY 6 HOURS PRN
Status: DISCONTINUED | OUTPATIENT
Start: 2023-03-22 | End: 2023-03-28 | Stop reason: HOSPADM

## 2023-03-22 RX ORDER — PHENOBARBITAL 32.4 MG/1
97.2 TABLET ORAL NIGHTLY
Status: DISCONTINUED | OUTPATIENT
Start: 2023-03-22 | End: 2023-03-28 | Stop reason: HOSPADM

## 2023-03-22 RX ADMIN — METOROPROLOL TARTRATE 5 MG: 5 INJECTION, SOLUTION INTRAVENOUS at 05:03

## 2023-03-22 RX ADMIN — IOHEXOL 100 ML: 350 INJECTION, SOLUTION INTRAVENOUS at 05:03

## 2023-03-22 RX ADMIN — OXCARBAZEPINE 150 MG: 150 TABLET, FILM COATED ORAL at 10:03

## 2023-03-22 RX ADMIN — SODIUM CHLORIDE 500 ML: 0.9 INJECTION, SOLUTION INTRAVENOUS at 02:03

## 2023-03-22 RX ADMIN — PHENOBARBITAL 97.2 MG: 32.4 TABLET ORAL at 10:03

## 2023-03-22 RX ADMIN — METOROPROLOL TARTRATE 5 MG: 5 INJECTION, SOLUTION INTRAVENOUS at 11:03

## 2023-03-22 RX ADMIN — CEFEPIME 2 G: 2 INJECTION, POWDER, FOR SOLUTION INTRAVENOUS at 06:03

## 2023-03-22 RX ADMIN — ONDANSETRON 4 MG: 4 TABLET, ORALLY DISINTEGRATING ORAL at 02:03

## 2023-03-22 NOTE — PROVIDER PROGRESS NOTES - EMERGENCY DEPT.
Encounter Date: 3/22/2023    ED Physician Progress Notes            Received pt in signout pending CT read. She has already received cefepime for UTI. Left a VM and sent secure chat to Dr. Johnson about CT concerning for SBO at 6:30 AM. On my reassessment at this time, pt has not had any additional episodes of vomiting since receiving Zofran in the emergency department.  On exam she has mild upper abdominal pain.  Has not had any additional output from ostomy throughout her time in the ED.    CT read:   Impression:     1. Dilated fluid-filled small bowel loops concerning for mechanical small bowel obstruction, suspected related to enclosed small-bowel loops within parastomal hernia sac about the left lower quadrant colostomy.  2. Thick-walled esophagus with suggested mucosal hyperemia, submucosal edema, and surrounding fluid, for which correlation for signs/symptoms of esophagitis would be recommended.  3. Nonspecific urinary bladder wall thickening, for which correlation for signs/symptoms of cystitis would be recommended.    Dr. Johnson requested NGT placement. Admitted to general surgery for further management.

## 2023-03-22 NOTE — NURSING
Received report from ER nurse, pt arrived on unit. NG tube in place verified by x-ray, 2L NC sating at 97%, external cath in place. Allergy and fall band in place. Pt NPO, bed in lowest position, call bell within reach bed alarm on for safety.

## 2023-03-22 NOTE — ED NOTES
Pt's son updated on plan of care, pt in no acute distress, is not in want/need of anything at this time, denies nausea, call light remains in reach.

## 2023-03-22 NOTE — ED NOTES
Pt is intermittently vomiting, NPO and connected to suction; Admit contacted to change pt meds to IV, specifically Metoprolol due to HR/BP; attempted to call pharmacy however unable to change due to dosage parameters, awaiting response.

## 2023-03-22 NOTE — Clinical Note
Diagnosis: SBO (small bowel obstruction) [556602]   Admitting Provider:: JOANNE ALFORD [[id number]]   Future Attending Provider: CLARISSA VELEZ [[id number]]   Reason for IP Medical Treatment  (Clinical interventions that can only be accomplished in the IP setting? ) :: surgical management   I certify that Inpatient services for greater than or equal to 2 midnights are medically necessary:: Yes   Plans for Post-Acute care--if anticipated (pick the single best option):: A. No post acute care anticipated at this time

## 2023-03-22 NOTE — ED NOTES
Explained the need for NG tube and will have pt swallow light sips with water for ease; Pt declining NG tube, states would like to rest, has been up all night.

## 2023-03-22 NOTE — ED NOTES
Review of patient's allergies indicates:   Allergen Reactions    Adhesive Itching and Blisters    Penicillins Anaphylaxis    Tramadol Hives    Avelox [moxifloxacin] Rash     Facial and arm itching and redness. Pt states throat closes when given.    Amoxil [amoxicillin]     Aspridrox [aspirin, buffered]     Codeine Other (See Comments)     Throat swelling    Keflex [cephalexin]      Tolerated cefepime and cefazolin    Norvasc [amlodipine]     Red dye Hives    Robitussin [guaifenesin]     Sulfa (sulfonamide antibiotics)     Tylenol [acetaminophen]      Has reaction to Tylenol with red dye and unable to take Extra Strength Tylenol/ CAN ONLY TOLERATE REG STRENGTH TYLENOL    Vicks vaporub [camphor-eucalyptus oil-menthol]        APPEARANCE: Alert, oriented x 4, and +anxious.  NEURO: 5/5 strength major flexors/extensors bilaterally. Sensory intact to light touch bilaterally. [place] coma scale: eyes open spontaneously-4, oriented & converses-5, obeys commands-6. No neurological abnormalities.   CARDIAC: +tachycardic Normal rate and rhythm, S1 and S2 noted, denies CP.   RESPIRATORY: +5L NC O2, sats 91 on RA, states wears at home prn, hx of pneumonia; Normal rate and effort, Respirations are equal and unlabored, no obvious signs of distress. No SOB reported.  PERIPHERAL VASCULAR: +2 peripheral pulses present. Normal cap refill <3sec. No edema. Warm to touch.   GASTRO: + intermittent NV, colostomy to RLQ, redness around site, abdomen taut w/ intact midline surgical scar noted, mild tenderness and mass palpated. Denies diarrhea at this time.  GENITOURINARY: +purewick noted, Voids spontaneously, denies itching, burning, hematuria.    Side rails x 2, bed low and locked position, call light in reach and instructed how to use. Pt educated on fall risk and verbalized understanding. Cardiac monitor, pulse ox, and automatic BP cuff applied; alarms set and audible.

## 2023-03-22 NOTE — H&P
Patient ID: Annette Garcia is a 87 y.o. female.    Chief Complaint: Emesis (Pt from [facility].  Complaining of nausea/vomiting.  Pt arrived on 3L/NC.)      HPI:  HPI  87F with NV ab pain that started yesterday. Has been admitted for this about 3 times in the past few months. Minimal ostomy output in the past day or so. Has known parastomal hernia.   Saw Dr Simmons yesterday.  Last eliquis last night.     Review of Systems   Constitutional:  Negative for fever.   HENT:  Negative for trouble swallowing.    Respiratory:  Negative for shortness of breath.    Cardiovascular:  Negative for chest pain.   Gastrointestinal:  Positive for abdominal pain, nausea and vomiting. Negative for blood in stool.   Genitourinary:  Negative for dysuria.   Musculoskeletal:  Negative for gait problem.   Skin:  Negative for rash and wound.   Allergic/Immunologic: Negative for immunocompromised state.   Neurological:  Negative for weakness.   Hematological:  Does not bruise/bleed easily.   Psychiatric/Behavioral:  Negative for agitation.      Current Facility-Administered Medications   Medication Dose Route Frequency Provider Last Rate Last Admin    enoxaparin injection 40 mg  40 mg Subcutaneous Daily Chris Johnson MD        hydrALAZINE injection 10 mg  10 mg Intravenous Q6H PRN Chrsi Johnson MD        levothyroxine tablet 125 mcg  125 mcg Oral Daily Chris Johnson MD        LIDOcaine HCl 2% oral solution 5 mL  5 mL Oral ED 1 Time Shannen Mosley MD        metoprolol injection 5 mg  5 mg Intravenous Q6H Chris Johnson MD   5 mg at 03/22/23 1111    ondansetron injection 8 mg  8 mg Intravenous Q6H PRN Chris Johnson MD        OXcarbazepine tablet 150 mg  150 mg Oral Nightly Chris Johnson MD        PHENobarbitaL tablet 97.2 mg  97.2 mg Oral QHS Chris Johnson MD         Current Outpatient Medications   Medication Sig Dispense Refill    acetaminophen (TYLENOL) 325 MG tablet Take 2 tablets  (650 mg total) by mouth every 4 (four) hours as needed for Pain.  0    apixaban (ELIQUIS) 2.5 mg Tab Take 1 tablet (2.5 mg total) by mouth 2 (two) times daily.      calcium-vitamin D 600 mg(1,500mg) -400 unit Tab Take 1 tablet by mouth once daily.      furosemide (LASIX) 20 MG tablet Take 20 mg by mouth once daily.      levothyroxine (SYNTHROID) 125 MCG tablet TAKE 1 TABLET (125 MCG TOTAL) BY MOUTH ONCE DAILY. 90 tablet 3    losartan (COZAAR) 50 MG tablet Take 0.5 tablets (25 mg total) by mouth once daily. (Patient taking differently: Take 25 mg by mouth once daily. 1/2 tablet) 90 tablet 3    magnesium oxide (MAG-OX) 400 mg (241.3 mg magnesium) tablet Take 2 tablets (800 mg total) by mouth once daily.  0    metoprolol tartrate (LOPRESSOR) 25 MG tablet Take 1 tablet (25 mg total) by mouth 2 (two) times daily. 60 tablet 11    OXcarbazepine (TRILEPTAL) 150 MG Tab Take 150 mg by mouth nightly.      pantoprazole (PROTONIX) 40 MG tablet Take 1 tablet (40 mg total) by mouth once daily. 90 tablet 0    PHENobarbitaL 97.2 MG tablet Take 97.2 mg by mouth every evening.      polyethylene glycol (GLYCOLAX) 17 gram PwPk Take 17 g by mouth once daily. 30 packet 5    potassium chloride SA (K-DUR,KLOR-CON) 20 MEQ tablet Take 20 mEq by mouth once daily.      pravastatin (PRAVACHOL) 20 MG tablet Take 20 mg by mouth once daily.      vitamin D (VITAMIN D3) 1000 units Tab Take 1,000 Units by mouth once daily.         Review of patient's allergies indicates:   Allergen Reactions    Adhesive Itching and Blisters    Penicillins Anaphylaxis    Tramadol Hives    Avelox [moxifloxacin] Rash     Facial and arm itching and redness. Pt states throat closes when given.    Amoxil [amoxicillin]     Aspridrox [aspirin, buffered]     Codeine Other (See Comments)     Throat swelling    Keflex [cephalexin]      Tolerated cefepime and cefazolin    Norvasc [amlodipine]     Red dye Hives    Robitussin [guaifenesin]     Sulfa (sulfonamide antibiotics)      Tylenol [acetaminophen]      Has reaction to Tylenol with red dye and unable to take Extra Strength Tylenol/ CAN ONLY TOLERATE REG STRENGTH TYLENOL    Vicks vaporub [camphor-eucalyptus oil-menthol]        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       Past Surgical History:   Procedure Laterality Date    APPENDECTOMY      BACK SURGERY  1988    vertebral fracture    BACK SURGERY  02/2013    lumbar L2-5    CATARACT EXTRACTION, BILATERAL Bilateral     CHOLECYSTECTOMY      open    ESOPHAGOGASTRODUODENOSCOPY N/A 2/15/2023    Procedure: EGD (ESOPHAGOGASTRODUODENOSCOPY);  Surgeon: Keith Chavarria MD;  Location: [facility];  Service: Endoscopy;  Laterality: N/A;    EYE SURGERY Bilateral     cataract removal with lens implant    HYSTERECTOMY      PERCUTANEOUS TRANSLUMINAL ANGIOPLASTY (PTA) OF PERIPHERAL VESSEL N/A 2/3/2020    Procedure: PTA, PERIPHERAL VESSEL;  Surgeon: Timmy Simmons MD;  Location: [facility] CATH LAB/EP;  Service: Cardiology;  Laterality: N/A;    PORTACATH PLACEMENT Right 09/2016    RENAL ARTERY STENT Left 07/19/2017    SIGMOIDECTOMY  10/29/2019    SMALL INTESTINE SURGERY  08/23/2016    THROMBECTOMY N/A 2/3/2020    Procedure: THROMBECTOMY;  Surgeon: Timmy Simmons MD;  Location: [facility] CATH LAB/EP;  Service: Cardiology;  Laterality: N/A;    TONSILLECTOMY      VAGINAL HYSTERECTOMY W/ ANTERIOR AND POSTERIOR VAGINAL REPAIR         Family History   Problem Relation Age of Onset    Pancreatic cancer Mother     Hypertension  Father     Heart attack Father        Social History     Socioeconomic History    Marital status:    Tobacco Use    Smoking status: Never    Smokeless tobacco: Never   Substance and Sexual Activity    Alcohol use: No    Drug use: No     Social Determinants of Health     Financial Resource Strain: Low Risk     Difficulty of Paying Living Expenses: Not hard at all   Food Insecurity: No Food Insecurity    Worried About Running Out of Food in the Last Year: Never true    Ran Out of Food in the Last Year: Never true   Transportation Needs: No Transportation Needs    Lack of Transportation (Medical): No    Lack of Transportation (Non-Medical): No   Physical Activity: Inactive    Days of Exercise per Week: 0 days    Minutes of Exercise per Session: 0 min   Stress: No Stress Concern Present    Feeling of Stress : Not at all   Housing Stability: Low Risk     Unable to Pay for Housing in the Last Year: No    Number of Places Lived in the Last Year: 1    Unstable Housing in the Last Year: No       Vitals:    03/22/23 1202   BP: (!) 179/75   Pulse: 83   Resp: 18   Temp:        Physical Exam  Constitutional:       General: She is not in acute distress.     Appearance: She is well-developed.   HENT:      Head: Normocephalic and atraumatic.   Eyes:      General: No scleral icterus.  Cardiovascular:      Rate and Rhythm: Normal rate.   Pulmonary:      Effort: Pulmonary effort is normal.      Breath sounds: No stridor.   Abdominal:      General: There is no distension.      Palpations: Abdomen is soft.      Tenderness: There is no abdominal tenderness.      Hernia: A hernia is present.      Comments: Parastomal hernia with mild TTP, feels reducible   Lymphadenopathy:      Cervical: No cervical adenopathy.   Skin:     General: Skin is warm.      Findings: No erythema.   Neurological:      Mental Status: She is alert and oriented to person, place, and time.   Psychiatric:         Behavior: Behavior normal.   Body mass index is  31.38 kg/m².    CT shows parastomal hernia with loop of small bowel  WBC normal  LA normal  Lytes ok    Assessment & Plan:   87F with parastomal hernia, recurrent  Failed conservative management, recurrent obstruction  Discussed surgery with family. She understands she is elevated risk for surgery but appears to be unavoidable  Hernia felt like it reduced on exam  Continue NG  Hold eliquis  Htn control  Plan for robot parastomal hernia repair tomorow

## 2023-03-22 NOTE — ED PROVIDER NOTES
NAME:  Annette Garcia  CSN:     [id number]  MRN:    [id number]  ADMIT DATE: 3/22/2023        eMERGENCY dEPARTMENT eNCOUnter    CHIEF COMPLAINT    Chief Complaint   Patient presents with    Emesis     Pt from [facility].  Complaining of nausea/vomiting.  Pt arrived on 3L/NC.       HPI    Annette Garcia is a 87 y.o. female with a past medical history of  has a past medical history of Allergy, Arthritis, Cancer, Coronary artery disease (08/14/2020), Digestive disorder, Disorder of kidney and ureter, E coli bacteremia (10/29/2019), Encounter for blood transfusion, Hypertension, Lone atrial fibrillation (10/30/2019), Petit mal epilepsy (1954), Scoliosis of lumbar spine, Seizures, Unspecified hypothyroidism, and UTI (urinary tract infection) (05/22/2022).     she presents to the ED due to generalized abdominal pain with nausea and vomiting throughout the day today.  She does have an ostomy bag due to previous obstruction, can not recall when they last change in his unsure if she is had bowel movements in her ostomy bag today.  Denies any fevers.  No urinary symptoms.  Does note that she is following closely with Dr. Luis, her general surgeon.      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       SURGICAL HISTORY    Past Surgical History:   Procedure Laterality Date    APPENDECTOMY      BACK SURGERY  1988    vertebral fracture    BACK SURGERY  02/2013    lumbar L2-5    CATARACT EXTRACTION, BILATERAL  Bilateral     CHOLECYSTECTOMY      open    ESOPHAGOGASTRODUODENOSCOPY N/A 2/15/2023    Procedure: EGD (ESOPHAGOGASTRODUODENOSCOPY);  Surgeon: Keith Chavarria MD;  Location: [facility] ENDO;  Service: Endoscopy;  Laterality: N/A;    EYE SURGERY Bilateral     cataract removal with lens implant    HYSTERECTOMY      PERCUTANEOUS TRANSLUMINAL ANGIOPLASTY (PTA) OF PERIPHERAL VESSEL N/A 2/3/2020    Procedure: PTA, PERIPHERAL VESSEL;  Surgeon: Timmy Simmons MD;  Location: [facility] CATH LAB/EP;  Service: Cardiology;  Laterality: N/A;    PORTACATH PLACEMENT Right 09/2016    RENAL ARTERY STENT Left 07/19/2017    SIGMOIDECTOMY  10/29/2019    SMALL INTESTINE SURGERY  08/23/2016    THROMBECTOMY N/A 2/3/2020    Procedure: THROMBECTOMY;  Surgeon: Timmy Simmons MD;  Location: [facility] CATH LAB/EP;  Service: Cardiology;  Laterality: N/A;    TONSILLECTOMY      VAGINAL HYSTERECTOMY W/ ANTERIOR AND POSTERIOR VAGINAL REPAIR         FAMILY HISTORY    Family History   Problem Relation Age of Onset    Pancreatic cancer Mother     Hypertension Father     Heart attack Father        SOCIAL HISTORY    Social History     Socioeconomic History    Marital status:    Tobacco Use    Smoking status: Never    Smokeless tobacco: Never   Substance and Sexual Activity    Alcohol use: No    Drug use: No     Social Determinants of Health     Financial Resource Strain: Low Risk     Difficulty of Paying Living Expenses: Not hard at all   Food Insecurity: No Food Insecurity    Worried About Running Out of Food in the Last Year: Never true    Ran Out of Food in the Last Year: Never true   Transportation Needs: No Transportation Needs    Lack of Transportation (Medical): No    Lack of Transportation (Non-Medical): No   Physical Activity: Inactive    Days of Exercise per Week: 0 days    Minutes of Exercise per Session: 0 min   Stress: No Stress Concern Present    Feeling of Stress : Not at all   Housing Stability: Low Risk     Unable to Pay for Housing  in the Last Year: No    Number of Places Lived in the Last Year: 1    Unstable Housing in the Last Year: No       MEDICATIONS  Current Outpatient Medications   Medication Instructions    acetaminophen (TYLENOL) 650 mg, Oral, Every 4 hours PRN    apixaban (ELIQUIS) 2.5 mg, Oral, 2 times daily    calcium-vitamin D 600 mg(1,500mg) -400 unit Tab 1 tablet, Oral, Daily    furosemide (LASIX) 20 mg, Oral, Daily    levothyroxine (SYNTHROID) 125 mcg, Oral, Daily    losartan (COZAAR) 25 mg, Oral, Daily    magnesium oxide (MAG-OX) 800 mg, Oral, Daily    metoprolol tartrate (LOPRESSOR) 25 mg, Oral, 2 times daily    OXcarbazepine (TRILEPTAL) 150 mg, Oral, Nightly    pantoprazole (PROTONIX) 40 mg, Oral, Daily    PHENobarbitaL 97.2 mg, Oral, Nightly    polyethylene glycol (GLYCOLAX) 17 g, Oral, Daily    potassium chloride SA (K-DUR,KLOR-CON) 20 MEQ tablet 20 mEq, Oral, Daily    pravastatin (PRAVACHOL) 20 mg, Oral, Daily    vitamin D (VITAMIN D3) 1,000 Units, Oral, Daily       ALLERGIES    Review of patient's allergies indicates:   Allergen Reactions    Adhesive Itching and Blisters    Penicillins Anaphylaxis    Tramadol Hives    Avelox [moxifloxacin] Rash     Facial and arm itching and redness. Pt states throat closes when given.    Amoxil [amoxicillin]     Aspridrox [aspirin, buffered]     Codeine Other (See Comments)     Throat swelling    Keflex [cephalexin]      Tolerated cefepime and cefazolin    Norvasc [amlodipine]     Red dye Hives    Robitussin [guaifenesin]     Sulfa (sulfonamide antibiotics)     Tylenol [acetaminophen]      Has reaction to Tylenol with red dye and unable to take Extra Strength Tylenol/ CAN ONLY TOLERATE REG STRENGTH TYLENOL    Vicks vaporub [camphor-eucalyptus oil-menthol]          REVIEW OF SYSTEMS   Review of Systems       PHYSICAL EXAM    Reviewed Triage Note    VITAL SIGNS:   ED Triage Vitals [03/22/23 0151]   Enc Vitals Group      BP (!) 168/76      Pulse 82      Resp 18      Temp 97.8 °F (36.6 °C)  "     Temp Source Oral      SpO2 97 %      Weight 145 lb      Height 4' 9"      Head Circumference       Peak Flow       Pain Score       Pain Loc       Pain Edu?       Excl. in GC?        Patient Vitals for the past 24 hrs:   BP Temp Temp src Pulse Resp SpO2 Height Weight   03/22/23 0424 -- 98.4 °F (36.9 °C) Oral -- -- -- -- --   03/22/23 0402 (!) 175/81 -- -- 85 -- 97 % -- --   03/22/23 0333 (!) 155/79 -- -- 93 -- 99 % -- --   03/22/23 0151 (!) 168/76 97.8 °F (36.6 °C) Oral 82 18 97 % 4' 9" (1.448 m) 65.8 kg (145 lb)       Physical Exam    Nursing note and vitals reviewed.  Constitutional: She appears well-developed and well-nourished.   HENT:   Head: Normocephalic and atraumatic.   Eyes: EOM are normal. Pupils are equal, round, and reactive to light.   Neck: Neck supple.   Normal range of motion.  Cardiovascular:  Normal rate and regular rhythm.           Pulmonary/Chest: Breath sounds normal. No respiratory distress.   Abdominal: She exhibits distension.   Off to me to the left mid abdomen with trace amount of light brown that is solid.  There is some erythema to the left anterior abdominal wall that is consistent with skin breakdown likely from irritation from her ostomy bag.  She does have generalized tenderness throughout the abdomen   Musculoskeletal:         General: Normal range of motion.      Cervical back: Normal range of motion and neck supple.     Neurological: She is alert and oriented to person, place, and time.   Skin: Skin is warm and dry.   Psychiatric: She has a normal mood and affect.          EKG     Interpreted by EM physician if performed:   Rate of 95. Normal sinus rhythm. No ST elevation or depression.  No T-wave inversions.  Normal intervals.  No evidence of ischemia.             LABS  Pertinent labs reviewed. (See chart for details)   Labs Reviewed   CBC W/ AUTO DIFFERENTIAL - Abnormal; Notable for the following components:       Result Value    MCH 32.3 (*)     Gran # (ANC) 8.9 (*)     " Gran % 77.6 (*)     Lymph % 16.1 (*)     All other components within normal limits   COMPREHENSIVE METABOLIC PANEL - Abnormal; Notable for the following components:    Sodium 131 (*)     Chloride 93 (*)     Glucose 137 (*)     All other components within normal limits   LACTIC ACID, PLASMA - Abnormal; Notable for the following components:    Lactate (Lactic Acid) 2.3 (*)     All other components within normal limits   LIPASE   URINALYSIS, REFLEX TO URINE CULTURE         RADIOLOGY          Imaging Results    None           PROCEDURES    Procedures      ED COURSE & MEDICAL DECISION MAKING    Pertinent Labs & Imaging studies reviewed. (See chart for details and specific orders.)        Summary of review of records:     Saw her cardiologist earlier today in attempt to get preop clearance for another abdominal procedure with Dr. Johnson.    Last evaluated by General surgery on February 27th with history of peristomal hernia without any evidence of obstruction.  Plan for possible laparoscopic prepare.    MDM:  Annette Garcia is a 87 y.o. female who presents for generalized abdominal pain with nausea and vomiting today.    CBC, CMP ordered as well as lipase, lactate, urinalysis, CT of the abdomen pelvis with contrast.        Medications   ceFEPIme (MAXIPIME) 2 g in dextrose 5 % in water (D5W) 5 % 50 mL IVPB (MB+) (2 g Intravenous New Bag 3/22/23 0609)   ondansetron disintegrating tablet 4 mg (4 mg Oral Given 3/22/23 0253)   0.9%  NaCl infusion (0 mLs Intravenous Stopped 3/22/23 0542)   iohexoL (OMNIPAQUE 350) injection 75 mL (100 mLs Intravenous Given 3/22/23 0520)       ED Course as of 03/27/23 1244   Wed Mar 22, 2023   0350 WBC: 11.42  No leukocytosis noted [HL]   0419 Lactate, Jim(!): 2.3  Receiving IVF, likely 2/2 dehydration, frequent vomiting.  [HL]   0500 Comprehensive metabolic panel(!)  Mild hypokalemia and hyponatremia noted. [HL]   0542 Urinalysis, Reflex to Urine Culture Urine, Clean Catch(!)  C/w  infection.  Most recent positive cultures from October of 2022 were resistant to gentamicin, levofloxacin and Cipro only.  Will treat with cefepime at this time due to multiple medication allergies. [HL]      ED Course User Index  [HL] Jane Hester DO     Plan to admit for IV antibiotics in the setting of complicated UTI. At time of sign-out, CT read concerning for possible small bowel obstruction.   will touch base with gen surg for further management prior to hospitalization.     FINAL IMPRESSION    Final diagnoses:  [R10.9] Abdominal pain  [N30.00] Acute cystitis without hematuria (Primary)       DISPOSITION  Patient admit in stable condition             DISCLAIMER: This note was prepared with [employer] voice recognition transcription software. Garbled syntax, mangled pronouns, and other bizarre constructions may be attributed to that software system.      DO Jane Kolb DO  03/22/23 0628       Jane Hester DO  03/27/23 1244

## 2023-03-22 NOTE — ED NOTES
NG tube placed, pt gown and bedding changed due to vomiting, xray ordered to check placement; O2 sats 91% on RA, 2L O2 NC cont'd. Call light in reach, SR x 2.

## 2023-03-22 NOTE — PLAN OF CARE
[facility] - Emergency Dept  Initial Discharge Assessment       Primary Care Provider: Jose Valles MD    Admission Diagnosis: SBO (small bowel obstruction) [K56.609]    Admission Date: 3/22/2023  Expected Discharge Date:     Discharge Barriers Identified: (P) None    Payor: MEDICARE / Plan: MEDICARE PART A & B / Product Type: Government /     Extended Emergency Contact Information  Primary Emergency Contact: Beatris Richey   [place]  Work Phone: [phone]  Mobile Phone: [phone]  Relation: Daughter  Secondary Emergency Contact: Lorenza Richey   [facility]  Home Phone: [phone]  Mobile Phone: [phone]  Relation: Daughter    Discharge Plan A: (P) Return to nursing home  Discharge Plan B: (P) Home with family      [facility] - [place], [place] - [address] [facility] [address]  [address] [facility] [address] [facility] [address]  [place] [address]  Phone: [phone] Fax: [phone]    [facility] - [place] - [address]  [address]  [place] [address]  Phone: [phone] Fax: [phone]      Initial Assessment (most recent)       Adult Discharge Assessment - 03/22/23 1531          Discharge Assessment    Assessment Type Discharge Planning Assessment (P)      Confirmed/corrected address, phone number and insurance Yes (P)      Source of Information facility verbal report (P)      People in Home facility resident (P)      Facility Arrived From: [facility]  [address] [place] [address] : [phone] (P)      Do you expect to return to your current living situation? Yes (P)      Do you have help at home or someone to help you manage your care at home? Yes (P)      Walking or Climbing Stairs ambulation difficulty, assistance 1 person (P)      Dressing/Bathing bathing difficulty, assistance 1 person (P)      Dressing/Bathing Management nursing home staff (P)      Equipment Currently Used at Home walker, rolling;wheelchair (P)      Patient currently  being followed by outpatient case management? No (P)      Do you take prescription medications? Yes (P)      Do you have prescription coverage? Yes (P)      Coverage Medicare A & B (P)      Do you have any problems affording any of your prescribed medications? No (P)      Is the patient taking medications as prescribed? yes (P)      Who is going to help you get home at discharge? Beatris Richey (Daughter)   [phone] (P)      Discharge Plan A Return to nursing home (P)      Discharge Plan B Home with family (P)      DME Needed Upon Discharge  none (P)      Discharge Barriers Identified None (P)         Social Connections    Are you , , , , never , or living with a partner?  (P)         OTHER    Name(s) of People in Home [facility] (P)                         Future Appointments   Date Time Provider Department Center   4/6/2023  9:00 AM [address] NUCLEAR [facility]   4/6/2023  9:30 AM CARDIOLOGY, STRESS/TILT TABLE/REDD [facility] CARD [facility]   4/6/2023 10:30 AM [address]     Kimberly Ballesteros, [facility]  ED Social Work  [phone]

## 2023-03-22 NOTE — PHARMACY MED REC
"  [facility]           Pharmacy  Admission Medication History     The home medication history was taken by Marcia Tabares.      Medication history obtained from Medications listed below were obtained from: Nursing home    Based on information gathered for medication list, you may go to "Admission" then "Reconcile Home Medications" tabs to review and/or act upon those items.     The home medication list has been updated by the Pharmacy department.   Please read ALL comments highlighted in yellow.   Please address this information as you see fit.    Feel free to contact us if you have any questions or require assistance.      No current facility-administered medications on file prior to encounter.     Current Outpatient Medications on File Prior to Encounter   Medication Sig Dispense Refill    acetaminophen (TYLENOL) 325 MG tablet Take 2 tablets (650 mg total) by mouth every 4 (four) hours as needed for Pain.  0    apixaban (ELIQUIS) 2.5 mg Tab Take 1 tablet (2.5 mg total) by mouth 2 (two) times daily.      calcium-vitamin D 600 mg(1,500mg) -400 unit Tab Take 1 tablet by mouth once daily.      furosemide (LASIX) 20 MG tablet Take 20 mg by mouth once daily.      levothyroxine (SYNTHROID) 125 MCG tablet TAKE 1 TABLET (125 MCG TOTAL) BY MOUTH ONCE DAILY. 90 tablet 3    losartan (COZAAR) 50 MG tablet Take 0.5 tablets (25 mg total) by mouth once daily. (Patient taking differently: Take 25 mg by mouth once daily. 1/2 tablet) 90 tablet 3    magnesium oxide (MAG-OX) 400 mg (241.3 mg magnesium) tablet Take 2 tablets (800 mg total) by mouth once daily.  0    metoprolol tartrate (LOPRESSOR) 25 MG tablet Take 1 tablet (25 mg total) by mouth 2 (two) times daily. 60 tablet 11    OXcarbazepine (TRILEPTAL) 150 MG Tab Take 150 mg by mouth nightly.      pantoprazole (PROTONIX) 40 MG tablet Take 1 tablet (40 mg total) by mouth once daily. 90 tablet 0    PHENobarbitaL 97.2 MG tablet Take 97.2 mg by mouth every " evening.      polyethylene glycol (GLYCOLAX) 17 gram PwPk Take 17 g by mouth once daily. 30 packet 5    potassium chloride SA (K-DUR,KLOR-CON) 20 MEQ tablet Take 20 mEq by mouth once daily.      pravastatin (PRAVACHOL) 20 MG tablet Take 20 mg by mouth once daily.      vitamin D (VITAMIN D3) 1000 units Tab Take 1,000 Units by mouth once daily.         Please address this information as you see fit.  Feel free to contact us if you have any questions or require assistance.    Marcia Tabares  [phone]                .

## 2023-03-22 NOTE — ED NOTES
Attempted In and out cath unsuccessfully.  Pt refusing to let us try again.  Bladder scan 299. Dr. Hester notified.  Scottie applied.

## 2023-03-22 NOTE — ED NOTES
Patient identifiers for Annette Garcia checked and correct.  LOC: The patient is awake, alert and aware of environment with an appropriate affect, the patient is oriented x 3 and speaking appropriately.  APPEARANCE: Patient resting comfortably and in no acute distress, patient is clean and well groomed, patient's clothing are properly fastened.  SKIN: The skin is warm and dry.  MUSKULOSKELETAL: Patient moving all extremities well, no obvious swelling or deformities noted.  RESPIRATORY: Airway is open and patent, respirations are spontaneous, patient has a normal effort and rate.  CARDIAC: Patient has a normal rate, no periphreal edema noted..  ABDOMEN: Colostomy bag in place.  NEUROLOGIC: PERRL, facial expression is symmetrical, bilateral hand grasp equal and even, normal sensation in all extremities when touched with a finger.

## 2023-03-23 ENCOUNTER — ANESTHESIA EVENT (OUTPATIENT)
Dept: SURGERY | Facility: HOSPITAL | Age: 88
DRG: 354 | End: 2023-03-23
Payer: MEDICARE

## 2023-03-23 ENCOUNTER — ANESTHESIA (OUTPATIENT)
Dept: SURGERY | Facility: HOSPITAL | Age: 88
DRG: 354 | End: 2023-03-23
Payer: MEDICARE

## 2023-03-23 PROCEDURE — 63600175 PHARM REV CODE 636 W HCPCS: Mod: TB,JG | Performed by: ANESTHESIOLOGY

## 2023-03-23 PROCEDURE — 63600175 PHARM REV CODE 636 W HCPCS: Performed by: STUDENT IN AN ORGANIZED HEALTH CARE EDUCATION/TRAINING PROGRAM

## 2023-03-23 PROCEDURE — 25000003 PHARM REV CODE 250: Performed by: NURSE ANESTHETIST, CERTIFIED REGISTERED

## 2023-03-23 PROCEDURE — 63600175 PHARM REV CODE 636 W HCPCS: Performed by: NURSE ANESTHETIST, CERTIFIED REGISTERED

## 2023-03-23 PROCEDURE — D9220A PRA ANESTHESIA: Mod: ANES,,, | Performed by: ANESTHESIOLOGY

## 2023-03-23 PROCEDURE — D9220A PRA ANESTHESIA: ICD-10-PCS | Mod: ANES,,, | Performed by: ANESTHESIOLOGY

## 2023-03-23 PROCEDURE — 49622 PR REPAIR PARASTOMAL HERNIA INITIAL/RECURR INCARC/STRANG: ICD-10-PCS | Mod: ,,, | Performed by: STUDENT IN AN ORGANIZED HEALTH CARE EDUCATION/TRAINING PROGRAM

## 2023-03-23 PROCEDURE — 63600175 PHARM REV CODE 636 W HCPCS

## 2023-03-23 PROCEDURE — 71000039 HC RECOVERY, EACH ADD'L HOUR: Performed by: STUDENT IN AN ORGANIZED HEALTH CARE EDUCATION/TRAINING PROGRAM

## 2023-03-23 PROCEDURE — 36000710: Performed by: STUDENT IN AN ORGANIZED HEALTH CARE EDUCATION/TRAINING PROGRAM

## 2023-03-23 PROCEDURE — C9290 INJ, BUPIVACAINE LIPOSOME: HCPCS | Performed by: STUDENT IN AN ORGANIZED HEALTH CARE EDUCATION/TRAINING PROGRAM

## 2023-03-23 PROCEDURE — D9220A PRA ANESTHESIA: ICD-10-PCS | Mod: CRNA,,, | Performed by: NURSE ANESTHETIST, CERTIFIED REGISTERED

## 2023-03-23 PROCEDURE — C1781 MESH (IMPLANTABLE): HCPCS | Performed by: STUDENT IN AN ORGANIZED HEALTH CARE EDUCATION/TRAINING PROGRAM

## 2023-03-23 PROCEDURE — 63600175 PHARM REV CODE 636 W HCPCS: Performed by: ANESTHESIOLOGY

## 2023-03-23 PROCEDURE — 36000711: Performed by: STUDENT IN AN ORGANIZED HEALTH CARE EDUCATION/TRAINING PROGRAM

## 2023-03-23 PROCEDURE — 27000221 HC OXYGEN, UP TO 24 HOURS

## 2023-03-23 PROCEDURE — D9220A PRA ANESTHESIA: Mod: CRNA,,, | Performed by: NURSE ANESTHETIST, CERTIFIED REGISTERED

## 2023-03-23 PROCEDURE — 94761 N-INVAS EAR/PLS OXIMETRY MLT: CPT

## 2023-03-23 PROCEDURE — 49622 RPR PARASTOMAL HRNA NCR/STRN: CPT | Mod: ,,, | Performed by: STUDENT IN AN ORGANIZED HEALTH CARE EDUCATION/TRAINING PROGRAM

## 2023-03-23 PROCEDURE — 37000009 HC ANESTHESIA EA ADD 15 MINS: Performed by: STUDENT IN AN ORGANIZED HEALTH CARE EDUCATION/TRAINING PROGRAM

## 2023-03-23 PROCEDURE — 37000008 HC ANESTHESIA 1ST 15 MINUTES: Performed by: STUDENT IN AN ORGANIZED HEALTH CARE EDUCATION/TRAINING PROGRAM

## 2023-03-23 PROCEDURE — 71000033 HC RECOVERY, INTIAL HOUR: Performed by: STUDENT IN AN ORGANIZED HEALTH CARE EDUCATION/TRAINING PROGRAM

## 2023-03-23 PROCEDURE — 11000001 HC ACUTE MED/SURG PRIVATE ROOM

## 2023-03-23 PROCEDURE — 25000003 PHARM REV CODE 250

## 2023-03-23 PROCEDURE — 25000003 PHARM REV CODE 250: Performed by: STUDENT IN AN ORGANIZED HEALTH CARE EDUCATION/TRAINING PROGRAM

## 2023-03-23 DEVICE — MESH SYMBOTEX HERNIA 20X15CM: Type: IMPLANTABLE DEVICE | Site: ABDOMEN | Status: FUNCTIONAL

## 2023-03-23 RX ORDER — ONDANSETRON 2 MG/ML
4 INJECTION INTRAMUSCULAR; INTRAVENOUS DAILY PRN
Status: DISCONTINUED | OUTPATIENT
Start: 2023-03-23 | End: 2023-03-24

## 2023-03-23 RX ORDER — LIDOCAINE HYDROCHLORIDE 20 MG/ML
INJECTION INTRAVENOUS
Status: DISCONTINUED | OUTPATIENT
Start: 2023-03-23 | End: 2023-03-23

## 2023-03-23 RX ORDER — PROPOFOL 10 MG/ML
VIAL (ML) INTRAVENOUS
Status: DISCONTINUED | OUTPATIENT
Start: 2023-03-23 | End: 2023-03-23

## 2023-03-23 RX ORDER — HYDROMORPHONE HYDROCHLORIDE 2 MG/ML
0.2 INJECTION, SOLUTION INTRAMUSCULAR; INTRAVENOUS; SUBCUTANEOUS EVERY 5 MIN PRN
Status: DISCONTINUED | OUTPATIENT
Start: 2023-03-23 | End: 2023-03-24

## 2023-03-23 RX ORDER — KETOROLAC TROMETHAMINE 30 MG/ML
15 INJECTION, SOLUTION INTRAMUSCULAR; INTRAVENOUS EVERY 6 HOURS
Status: DISCONTINUED | OUTPATIENT
Start: 2023-03-23 | End: 2023-03-24

## 2023-03-23 RX ORDER — SUCCINYLCHOLINE CHLORIDE 20 MG/ML
INJECTION INTRAMUSCULAR; INTRAVENOUS
Status: DISCONTINUED | OUTPATIENT
Start: 2023-03-23 | End: 2023-03-23

## 2023-03-23 RX ORDER — PROCHLORPERAZINE EDISYLATE 5 MG/ML
5 INJECTION INTRAMUSCULAR; INTRAVENOUS EVERY 30 MIN PRN
Status: DISCONTINUED | OUTPATIENT
Start: 2023-03-23 | End: 2023-03-24

## 2023-03-23 RX ORDER — FENTANYL CITRATE 50 UG/ML
25 INJECTION, SOLUTION INTRAMUSCULAR; INTRAVENOUS EVERY 5 MIN PRN
Status: DISCONTINUED | OUTPATIENT
Start: 2023-03-23 | End: 2023-03-24

## 2023-03-23 RX ORDER — PHENYLEPHRINE HYDROCHLORIDE 10 MG/ML
INJECTION INTRAVENOUS
Status: DISCONTINUED | OUTPATIENT
Start: 2023-03-23 | End: 2023-03-23

## 2023-03-23 RX ORDER — FENTANYL CITRATE 50 UG/ML
INJECTION, SOLUTION INTRAMUSCULAR; INTRAVENOUS
Status: DISCONTINUED | OUTPATIENT
Start: 2023-03-23 | End: 2023-03-23

## 2023-03-23 RX ORDER — ACETAMINOPHEN 10 MG/ML
1000 INJECTION, SOLUTION INTRAVENOUS ONCE
Status: COMPLETED | OUTPATIENT
Start: 2023-03-23 | End: 2023-03-23

## 2023-03-23 RX ORDER — NEOSTIGMINE METHYLSULFATE 1 MG/ML
INJECTION, SOLUTION INTRAVENOUS
Status: DISCONTINUED | OUTPATIENT
Start: 2023-03-23 | End: 2023-03-23

## 2023-03-23 RX ORDER — OXYCODONE AND ACETAMINOPHEN 5; 325 MG/1; MG/1
1 TABLET ORAL
Status: DISCONTINUED | OUTPATIENT
Start: 2023-03-23 | End: 2023-03-24

## 2023-03-23 RX ORDER — DEXAMETHASONE SODIUM PHOSPHATE 4 MG/ML
INJECTION, SOLUTION INTRA-ARTICULAR; INTRALESIONAL; INTRAMUSCULAR; INTRAVENOUS; SOFT TISSUE
Status: DISCONTINUED | OUTPATIENT
Start: 2023-03-23 | End: 2023-03-23

## 2023-03-23 RX ORDER — BUPIVACAINE HYDROCHLORIDE 5 MG/ML
INJECTION, SOLUTION PERINEURAL
Status: DISCONTINUED | OUTPATIENT
Start: 2023-03-23 | End: 2023-03-23 | Stop reason: HOSPADM

## 2023-03-23 RX ORDER — ROCURONIUM BROMIDE 10 MG/ML
INJECTION, SOLUTION INTRAVENOUS
Status: DISCONTINUED | OUTPATIENT
Start: 2023-03-23 | End: 2023-03-23

## 2023-03-23 RX ORDER — ONDANSETRON 2 MG/ML
INJECTION INTRAMUSCULAR; INTRAVENOUS
Status: DISCONTINUED | OUTPATIENT
Start: 2023-03-23 | End: 2023-03-23

## 2023-03-23 RX ADMIN — ROCURONIUM BROMIDE 10 MG: 10 INJECTION, SOLUTION INTRAVENOUS at 11:03

## 2023-03-23 RX ADMIN — FENTANYL CITRATE 25 MCG: 50 INJECTION, SOLUTION INTRAMUSCULAR; INTRAVENOUS at 01:03

## 2023-03-23 RX ADMIN — FENTANYL CITRATE 50 MCG: 50 INJECTION, SOLUTION INTRAMUSCULAR; INTRAVENOUS at 12:03

## 2023-03-23 RX ADMIN — LIDOCAINE HYDROCHLORIDE 80 MG: 20 INJECTION, SOLUTION INTRAVENOUS at 11:03

## 2023-03-23 RX ADMIN — PHENYLEPHRINE HYDROCHLORIDE 50 MCG: 10 INJECTION INTRAVENOUS at 12:03

## 2023-03-23 RX ADMIN — PHENOBARBITAL 97.2 MG: 32.4 TABLET ORAL at 10:03

## 2023-03-23 RX ADMIN — SODIUM CHLORIDE: 0.9 INJECTION, SOLUTION INTRAVENOUS at 01:03

## 2023-03-23 RX ADMIN — ONDANSETRON 4 MG: 2 INJECTION, SOLUTION INTRAMUSCULAR; INTRAVENOUS at 11:03

## 2023-03-23 RX ADMIN — PHENYLEPHRINE HYDROCHLORIDE 100 MCG: 10 INJECTION INTRAVENOUS at 11:03

## 2023-03-23 RX ADMIN — NEOSTIGMINE METHYLSULFATE 3 MG: 1 INJECTION INTRAVENOUS at 01:03

## 2023-03-23 RX ADMIN — METOROPROLOL TARTRATE 5 MG: 5 INJECTION, SOLUTION INTRAVENOUS at 01:03

## 2023-03-23 RX ADMIN — ONDANSETRON 4 MG: 2 INJECTION, SOLUTION INTRAMUSCULAR; INTRAVENOUS at 01:03

## 2023-03-23 RX ADMIN — FENTANYL CITRATE 25 MCG: 50 INJECTION INTRAMUSCULAR; INTRAVENOUS at 02:03

## 2023-03-23 RX ADMIN — SODIUM CHLORIDE, SODIUM LACTATE, POTASSIUM CHLORIDE, AND CALCIUM CHLORIDE: .6; .31; .03; .02 INJECTION, SOLUTION INTRAVENOUS at 11:03

## 2023-03-23 RX ADMIN — ENOXAPARIN SODIUM 40 MG: 40 INJECTION SUBCUTANEOUS at 04:03

## 2023-03-23 RX ADMIN — KETOROLAC TROMETHAMINE 15 MG: 30 INJECTION, SOLUTION INTRAMUSCULAR; INTRAVENOUS at 06:03

## 2023-03-23 RX ADMIN — SUCCINYLCHOLINE CHLORIDE 120 MG: 20 INJECTION, SOLUTION INTRAMUSCULAR; INTRAVENOUS at 11:03

## 2023-03-23 RX ADMIN — PROPOFOL 100 MG: 10 INJECTION, EMULSION INTRAVENOUS at 11:03

## 2023-03-23 RX ADMIN — METOROPROLOL TARTRATE 5 MG: 5 INJECTION, SOLUTION INTRAVENOUS at 07:03

## 2023-03-23 RX ADMIN — LEVOTHYROXINE SODIUM 125 MCG: 75 TABLET ORAL at 09:03

## 2023-03-23 RX ADMIN — FENTANYL CITRATE 50 MCG: 50 INJECTION, SOLUTION INTRAMUSCULAR; INTRAVENOUS at 11:03

## 2023-03-23 RX ADMIN — PHENYLEPHRINE HYDROCHLORIDE 200 MCG: 10 INJECTION INTRAVENOUS at 11:03

## 2023-03-23 RX ADMIN — DEXAMETHASONE SODIUM PHOSPHATE 8 MG: 4 INJECTION, SOLUTION INTRA-ARTICULAR; INTRALESIONAL; INTRAMUSCULAR; INTRAVENOUS; SOFT TISSUE at 11:03

## 2023-03-23 RX ADMIN — OXCARBAZEPINE 150 MG: 150 TABLET, FILM COATED ORAL at 10:03

## 2023-03-23 RX ADMIN — METOROPROLOL TARTRATE 5 MG: 5 INJECTION, SOLUTION INTRAVENOUS at 06:03

## 2023-03-23 RX ADMIN — ROCURONIUM BROMIDE 30 MG: 10 INJECTION, SOLUTION INTRAVENOUS at 11:03

## 2023-03-23 RX ADMIN — GLYCOPYRROLATE 0.4 MG: 0.2 INJECTION, SOLUTION INTRAMUSCULAR; INTRAVITREAL at 01:03

## 2023-03-23 RX ADMIN — ACETAMINOPHEN 1000 MG: 10 INJECTION INTRAVENOUS at 02:03

## 2023-03-23 NOTE — PLAN OF CARE
Martinez removed by Dr. Salcido. Upon removal bloody discharged was noted. Dr. Johnson made aware and assessed in PACU.

## 2023-03-23 NOTE — OP NOTE
DATE OF PROCEDURE:  3/23/2023    PREOPERATIVE DIAGNOSIS:  Parastomal hernia    POSTOPERATIVE DIAGNOSIS:  Parastomal hernia    PROCEDURE:  Robot assistant laparoscopic parastomal hernia repair with 15cm by 20cm symbotex mesh    SURGEON:  Chris Johnson M.D.    ASSISTANT:  Jose Salcido PGY2    ANESTHESIA:  General endotracheal.    PREP:  Chlorhexidine.    SPECIMEN:  None    ESTIMATED BLOOD LOSS:  Minimal.    INDICATIONS:  The patient is a 87F who presents to ED with   Obstructing parastomal hernia.  The patient was counseled on his options for   treatment and desired to proceed with surgical intervention.  The risks of the   procedure were described to the patient including bleeding, infection, pain,   scarring, wound complications, and recurrence.  The patient demonstrated understanding of these risks and a consent   form was obtained.    PROCEDURE:  The patient was identified in the Preoperative Unit and taken back   to the Operating Room and laid supine on the operating room table.  IV   antibiotics were administered prior to the induction of general anesthesia.    General anesthesia was induced without complication.  The patient was then   prepped and draped in standard sterile fashion manner.  A timeout procedure was   performed in accordance of hospital protocol.      A 5mm incision was made in the epigastrium. THe abdomen was entered without issue and the abdomen insufflated.  A 8 mm incision was made in the RUQ upper quadrant location using a #15 blade scalpel.  A 8mm optical trocar was used to enter the abdomen.   A camera was inserted and the abdomen   was inspected.  There did not appear to be any abnormalities within the   Abdomen.  At this point, an 8mm right sided trocar and a 8-mm right lower quadrant   trocars were then placed under direct visualization.   The hernia defect was identified and did not contain any bowel. The bowel was reduced preoperatively but did contain a large tongue of omentum  that required a great deal of dissection in order to free.   The colostomy defect was identified.   It measured about 4cm.  The defect was closed around the colon medially using 1 PDS strattafix suture in a running fashion. A 15cm by 20cm round symbotex mesh was then inserted and secured using 2-0 nonabsorbable vloc sutures circumferentially  in a Sugarbaker fashion including around the colon itself. The mesocolon was nestled between the colon and the mesh.  The mesh was noted to be flat and it good position with good coverage of the hernia. Skin was closed using 4-0 monocryl and sterile dressings were applied.      The patient was awakened from anesthesia without   complication and returned to the Postop Recovery in stable condition.  At the   end of the case, sponge and needle counts were correct on 2 occasions.  I was   present and scrubbed throughout the entirety of the case.    COMPLICATIONS:  None.    CONDITION:  Stable.

## 2023-03-23 NOTE — TRANSFER OF CARE
"Anesthesia Transfer of Care Note    Patient: Annette Garcia    Procedure(s) Performed: Procedure(s) (LRB):  ROBOTIC REPAIR, HERNIA, VENTRAL (N/A)    Patient location: PACU    Anesthesia Type: general    Transport from OR: Transported from OR on 6-10 L/min O2 by face mask with adequate spontaneous ventilation    Post pain: adequate analgesia    Post assessment: no apparent anesthetic complications    Post vital signs: stable    Level of consciousness: awake, alert and oriented    Nausea/Vomiting: no nausea/vomiting    Complications: none    Transfer of care protocol was followed      Last vitals:   Visit Vitals  BP (!) 143/60 (BP Location: Right arm, Patient Position: Lying)   Pulse 72   Temp 36.6 °C (97.8 °F) (Oral)   Resp 18   Ht 4' 9" (1.448 m)   Wt 65.8 kg (145 lb 1 oz)   LMP  (LMP Unknown)   SpO2 100%   BMI 31.39 kg/m²     "

## 2023-03-23 NOTE — SUBJECTIVE & OBJECTIVE
Interval History: NAEON. VSS. Afebrile. Eager to proceed with surgery to repair parastomal hernia today.     Medications:  Continuous Infusions:  Scheduled Meds:   enoxaparin  40 mg Subcutaneous Daily    levothyroxine  125 mcg Oral Daily    metoprolol  5 mg Intravenous Q6H    OXcarbazepine  150 mg Oral Nightly    PHENobarbitaL  97.2 mg Oral QHS     PRN Meds:hydrALAZINE, ondansetron     Review of patient's allergies indicates:   Allergen Reactions    Adhesive Itching and Blisters    Penicillins Anaphylaxis    Tramadol Hives    Avelox [moxifloxacin] Rash     Facial and arm itching and redness. Pt states throat closes when given.    Amoxil [amoxicillin]     Aspridrox [aspirin, buffered]     Codeine Other (See Comments)     Throat swelling    Keflex [cephalexin]      Tolerated cefepime and cefazolin    Norvasc [amlodipine]     Red dye Hives    Robitussin [guaifenesin]     Sulfa (sulfonamide antibiotics)     Tylenol [acetaminophen]      Has reaction to Tylenol with red dye and unable to take Extra Strength Tylenol/ CAN ONLY TOLERATE REG STRENGTH TYLENOL    Vicks vaporub [camphor-eucalyptus oil-menthol]      Objective:     Vital Signs (Most Recent):  Temp: 97.8 °F (36.6 °C) (03/23/23 0750)  Pulse: 72 (03/23/23 0750)  Resp: 18 (03/23/23 0750)  BP: (!) 143/60 (03/23/23 0750)  SpO2: 100 % (03/23/23 0750)   Vital Signs (24h Range):  Temp:  [96 °F (35.6 °C)-97.9 °F (36.6 °C)] 97.8 °F (36.6 °C)  Pulse:  [] 72  Resp:  [15-18] 18  SpO2:  [92 %-100 %] 100 %  BP: (134-179)/(59-79) 143/60     Weight: 65.8 kg (145 lb 1 oz)  Body mass index is 31.39 kg/m².    Intake/Output - Last 3 Shifts         03/21 0700  03/22 0659 03/22 0700  03/23 0659 03/23 0700  03/24 0659    I.V. (mL/kg) 500 (7.6)      IV Piggyback 50      Total Intake(mL/kg) 550 (8.4)      Urine (mL/kg/hr)  100 (0.1)     Emesis/NG output  200     Other  380     Total Output  680     Net +550 -680                    Physical Exam  Vitals and nursing note reviewed.    Constitutional:       General: She is not in acute distress.     Appearance: Normal appearance. She is not ill-appearing.   HENT:      Nose:      Comments: NGT in place  Eyes:      Extraocular Movements: Extraocular movements intact.      Conjunctiva/sclera: Conjunctivae normal.   Cardiovascular:      Rate and Rhythm: Normal rate and regular rhythm.   Pulmonary:      Effort: Pulmonary effort is normal. No respiratory distress.   Abdominal:      General: There is no distension.      Palpations: Abdomen is soft.      Comments: Parastomal hernia present.  Mild tenderness to palpation.    Skin:     General: Skin is warm and dry.   Neurological:      General: No focal deficit present.      Mental Status: She is alert and oriented to person, place, and time.       Significant Labs:  I have reviewed all pertinent lab results within the past 24 hours.    Significant Diagnostics:  I have reviewed all pertinent imaging results/findings within the past 24 hours.

## 2023-03-23 NOTE — ANESTHESIA PROCEDURE NOTES
Geoffrey Arriaga is a 47 year old male presenting with back pain and medication renewal.   Pt has some paperwork regarding disability he would like to discuss.     Denies known Latex allergy or symptoms of Latex sensitivity.  Medications verified, no changes.  Tobacco history verified       Intubation    Date/Time: 3/23/2023 11:29 AM  Performed by: Brandee Frank CRNA  Authorized by: Héctor Murphy MD     Intubation:     Induction:  Rapid sequence induction    Intubated:  Postinduction    Mask Ventilation:  Not attempted    Attempts:  1    Attempted By:  Student    Method of Intubation:  Video laryngoscopy    Blade:  [place] 3    Laryngeal View Grade: Grade I - full view of cords      Difficult Airway Encountered?: No      Complications:  None    Airway Device:  Oral endotracheal tube    Airway Device Size:  7.0    Style/Cuff Inflation:  Cuffed (inflated to minimal occlusive pressure)    Inflation Amount (mL):  6    Tube secured:  21    Secured at:  The lips    Placement Verified By:  Capnometry    Complicating Factors:  None    Findings Post-Intubation:  BS equal bilateral and atraumatic/condition of teeth unchanged

## 2023-03-23 NOTE — ANESTHESIA PREPROCEDURE EVALUATION
03/23/2023  Annette Garcia is a 87 y.o., female. Nursing home patient      Pre-op Assessment    I have reviewed the Patient Summary Reports.     I have reviewed the Nursing Notes. I have reviewed the NPO Status.   I have reviewed the Medications.     Review of Systems  Cardiovascular:   Hypertension CAD   Normal echo 2020       Physical Exam    Airway:  Mallampati: II   Mouth Opening: Normal  TM Distance: Normal  Neck ROM: Normal ROM        Anesthesia Plan  Type of Anesthesia, risks & benefits discussed:    Anesthesia Type: Gen ETT  Intra-op Monitoring Plan: Standard ASA Monitors  Post Op Pain Control Plan: multimodal analgesia  Induction:  IV  Airway Plan: Video  Informed Consent: Informed consent signed with the Patient and all parties understand the risks and agree with anesthesia plan.  All questions answered.   ASA Score: 4  Day of Surgery Review of History & Physical: H&P Update referred to the surgeon/provider.    Ready For Surgery From Anesthesia Perspective.     .

## 2023-03-23 NOTE — ASSESSMENT & PLAN NOTE
Annette Garcia is a 87 y.o. female with a small bowel obstruction secondary to a parastomal hernia.    -Discussed surgery with family. She understands she is elevated risk for surgery but appears to be unavoidable  -Consent signed at bedside; will proceed to OR today  -Continue NG  -Hold eliquis  -Htn control

## 2023-03-23 NOTE — PLAN OF CARE
"   03/23/23 1231   Post-Acute Status   Post-Acute Authorization Placement   Post-Acute Placement Status Pending medical clearance/testing  (Pt to return to [facility] upon DC. Procedure today. Pending medical clearance for return.)   Discharge Delays None known at this time   Discharge Plan   Discharge Plan A Return to nursing home       Future Appointments   Date Time Provider Department Center   4/5/2023  3:00 PM [place] [facility] [place] [facility]   4/6/2023  9:00 AM [address] NUCLEAR [facility]   4/6/2023  9:30 AM CARDIOLOGY, STRESS/TILT TABLE/REDD [facility] CARD [facility]   4/6/2023 10:30 AM [address] NUCLEAR [facility]       BP (!) 143/60 (BP Location: Right arm, Patient Position: Lying)   Pulse 72   Temp 97.8 °F (36.6 °C) (Oral)   Resp 18   Ht 4' 9" (1.448 m)   Wt 65.8 kg (145 lb 1 oz)   LMP  (LMP Unknown)   SpO2 100%   BMI 31.39 kg/m²    enoxaparin  40 mg Subcutaneous Daily    levothyroxine  125 mcg Oral Daily    metoprolol  5 mg Intravenous Q6H    OXcarbazepine  150 mg Oral Nightly    PHENobarbitaL  97.2 mg Oral QHS       "

## 2023-03-23 NOTE — PLAN OF CARE
Problem: Adult Inpatient Plan of Care  Goal: Plan of Care Review  Outcome: Ongoing, Progressing  Goal: Absence of Hospital-Acquired Illness or Injury  Outcome: Ongoing, Progressing  Goal: Optimal Comfort and Wellbeing  Outcome: Ongoing, Progressing     Problem: Fall Injury Risk  Goal: Absence of Fall and Fall-Related Injury  Outcome: Ongoing, Progressing     Problem: Skin Injury Risk Increased  Goal: Skin Health and Integrity  Outcome: Ongoing, Progressing     POC reviewed with patient. All questions and concerns reviewed. Vital signs stable throughout shift. For full assessment please refer to flowsheet. Fall/ safety precautions implemented & maintained. Bed locked in lowest position, bed alarm activated & audible, & call light in reach. Will continue to monitor.

## 2023-03-23 NOTE — PROGRESS NOTES
[facility]  General Surgery  Progress Note    Subjective:     History of Present Illness:  No notes on file    Post-Op Info:  Procedure(s) (LRB):  ROBOTIC REPAIR, HERNIA, VENTRAL (N/A)   Day of Surgery     Interval History: NAEON. VSS. Afebrile. Eager to proceed with surgery to repair parastomal hernia today.     Medications:  Continuous Infusions:  Scheduled Meds:   enoxaparin  40 mg Subcutaneous Daily    levothyroxine  125 mcg Oral Daily    metoprolol  5 mg Intravenous Q6H    OXcarbazepine  150 mg Oral Nightly    PHENobarbitaL  97.2 mg Oral QHS     PRN Meds:hydrALAZINE, ondansetron     Review of patient's allergies indicates:   Allergen Reactions    Adhesive Itching and Blisters    Penicillins Anaphylaxis    Tramadol Hives    Avelox [moxifloxacin] Rash     Facial and arm itching and redness. Pt states throat closes when given.    Amoxil [amoxicillin]     Aspridrox [aspirin, buffered]     Codeine Other (See Comments)     Throat swelling    Keflex [cephalexin]      Tolerated cefepime and cefazolin    Norvasc [amlodipine]     Red dye Hives    Robitussin [guaifenesin]     Sulfa (sulfonamide antibiotics)     Tylenol [acetaminophen]      Has reaction to Tylenol with red dye and unable to take Extra Strength Tylenol/ CAN ONLY TOLERATE REG STRENGTH TYLENOL    Vicks vaporub [camphor-eucalyptus oil-menthol]      Objective:     Vital Signs (Most Recent):  Temp: 97.8 °F (36.6 °C) (03/23/23 0750)  Pulse: 72 (03/23/23 0750)  Resp: 18 (03/23/23 0750)  BP: (!) 143/60 (03/23/23 0750)  SpO2: 100 % (03/23/23 0750)   Vital Signs (24h Range):  Temp:  [96 °F (35.6 °C)-97.9 °F (36.6 °C)] 97.8 °F (36.6 °C)  Pulse:  [] 72  Resp:  [15-18] 18  SpO2:  [92 %-100 %] 100 %  BP: (134-179)/(59-79) 143/60     Weight: 65.8 kg (145 lb 1 oz)  Body mass index is 31.39 kg/m².    Intake/Output - Last 3 Shifts         03/21 0700 03/22 0659 03/22 0700 03/23 0659 03/23 0700 03/24 0659    I.V. (mL/kg) 500 (7.6)      IV  Piggyback 50      Total Intake(mL/kg) 550 (8.4)      Urine (mL/kg/hr)  100 (0.1)     Emesis/NG output  200     Other  380     Total Output  680     Net +550 -680                    Physical Exam  Vitals and nursing note reviewed.   Constitutional:       General: She is not in acute distress.     Appearance: Normal appearance. She is not ill-appearing.   HENT:      Nose:      Comments: NGT in place  Eyes:      Extraocular Movements: Extraocular movements intact.      Conjunctiva/sclera: Conjunctivae normal.   Cardiovascular:      Rate and Rhythm: Normal rate and regular rhythm.   Pulmonary:      Effort: Pulmonary effort is normal. No respiratory distress.   Abdominal:      General: There is no distension.      Palpations: Abdomen is soft.      Comments: Parastomal hernia present.  Mild tenderness to palpation.    Skin:     General: Skin is warm and dry.   Neurological:      General: No focal deficit present.      Mental Status: She is alert and oriented to person, place, and time.       Significant Labs:  I have reviewed all pertinent lab results within the past 24 hours.    Significant Diagnostics:  I have reviewed all pertinent imaging results/findings within the past 24 hours.    Assessment/Plan:     * Parastomal hernia with obstruction and without gangrene  Annette Garcia is a 87 y.o. female with a small bowel obstruction secondary to a parastomal hernia.    -Discussed surgery with family. She understands she is elevated risk for surgery but appears to be unavoidable  -Consent signed at bedside; will proceed to OR today  -Continue NG  -Hold eliquis  -Htn control        Jose Salcido MD  General Surgery  [place] - Telemetry

## 2023-03-23 NOTE — PLAN OF CARE
Pacu discharge criteria met. Report called to Heather ARRIOLA/4A. Transported to room 424 via bed,sr upx4.

## 2023-03-24 LAB
ANION GAP SERPL CALC-SCNC: 12 MMOL/L (ref 8–16)
BASOPHILS # BLD AUTO: 0.02 K/UL (ref 0–0.2)
BASOPHILS NFR BLD: 0.2 % (ref 0–1.9)
BUN SERPL-MCNC: 22 MG/DL (ref 8–23)
CALCIUM SERPL-MCNC: 9 MG/DL (ref 8.7–10.5)
CHLORIDE SERPL-SCNC: 101 MMOL/L (ref 95–110)
CO2 SERPL-SCNC: 24 MMOL/L (ref 23–29)
CREAT SERPL-MCNC: 0.8 MG/DL (ref 0.5–1.4)
DIFFERENTIAL METHOD: ABNORMAL
EOSINOPHIL # BLD AUTO: 0 K/UL (ref 0–0.5)
EOSINOPHIL NFR BLD: 0.4 % (ref 0–8)
ERYTHROCYTE [DISTWIDTH] IN BLOOD BY AUTOMATED COUNT: 13.9 % (ref 11.5–14.5)
EST. GFR  (NO RACE VARIABLE): >60 ML/MIN/1.73 M^2
GLUCOSE SERPL-MCNC: 85 MG/DL (ref 70–110)
HCT VFR BLD AUTO: 30.4 % (ref 37–48.5)
HGB BLD-MCNC: 9.9 G/DL (ref 12–16)
IMM GRANULOCYTES # BLD AUTO: 0.03 K/UL (ref 0–0.04)
IMM GRANULOCYTES NFR BLD AUTO: 0.4 % (ref 0–0.5)
LYMPHOCYTES # BLD AUTO: 1.6 K/UL (ref 1–4.8)
LYMPHOCYTES NFR BLD: 19.1 % (ref 18–48)
MAGNESIUM SERPL-MCNC: 1.8 MG/DL (ref 1.6–2.6)
MCH RBC QN AUTO: 31.9 PG (ref 27–31)
MCHC RBC AUTO-ENTMCNC: 32.6 G/DL (ref 32–36)
MCV RBC AUTO: 98 FL (ref 82–98)
MONOCYTES # BLD AUTO: 0.8 K/UL (ref 0.3–1)
MONOCYTES NFR BLD: 9.3 % (ref 4–15)
NEUTROPHILS # BLD AUTO: 6 K/UL (ref 1.8–7.7)
NEUTROPHILS NFR BLD: 70.6 % (ref 38–73)
NRBC BLD-RTO: 0 /100 WBC
PHOSPHATE SERPL-MCNC: 3.3 MG/DL (ref 2.7–4.5)
PLATELET # BLD AUTO: 154 K/UL (ref 150–450)
PMV BLD AUTO: 9.9 FL (ref 9.2–12.9)
POTASSIUM SERPL-SCNC: 4 MMOL/L (ref 3.5–5.1)
RBC # BLD AUTO: 3.1 M/UL (ref 4–5.4)
SODIUM SERPL-SCNC: 137 MMOL/L (ref 136–145)
WBC # BLD AUTO: 8.42 K/UL (ref 3.9–12.7)

## 2023-03-24 PROCEDURE — 85025 COMPLETE CBC W/AUTO DIFF WBC: CPT

## 2023-03-24 PROCEDURE — 63600175 PHARM REV CODE 636 W HCPCS

## 2023-03-24 PROCEDURE — 83735 ASSAY OF MAGNESIUM: CPT

## 2023-03-24 PROCEDURE — 25000003 PHARM REV CODE 250: Performed by: STUDENT IN AN ORGANIZED HEALTH CARE EDUCATION/TRAINING PROGRAM

## 2023-03-24 PROCEDURE — 25000003 PHARM REV CODE 250

## 2023-03-24 PROCEDURE — 84100 ASSAY OF PHOSPHORUS: CPT

## 2023-03-24 PROCEDURE — 99231 SBSQ HOSP IP/OBS SF/LOW 25: CPT | Mod: ,,, | Performed by: SURGERY

## 2023-03-24 PROCEDURE — 63600175 PHARM REV CODE 636 W HCPCS: Performed by: ANESTHESIOLOGY

## 2023-03-24 PROCEDURE — 25000003 PHARM REV CODE 250: Performed by: SURGERY

## 2023-03-24 PROCEDURE — 63600175 PHARM REV CODE 636 W HCPCS: Performed by: STUDENT IN AN ORGANIZED HEALTH CARE EDUCATION/TRAINING PROGRAM

## 2023-03-24 PROCEDURE — 36415 COLL VENOUS BLD VENIPUNCTURE: CPT

## 2023-03-24 PROCEDURE — 99231 PR SUBSEQUENT HOSPITAL CARE,LEVL I: ICD-10-PCS | Mod: ,,, | Performed by: SURGERY

## 2023-03-24 PROCEDURE — 80048 BASIC METABOLIC PNL TOTAL CA: CPT

## 2023-03-24 PROCEDURE — 11000001 HC ACUTE MED/SURG PRIVATE ROOM

## 2023-03-24 RX ORDER — DIPHENHYDRAMINE HYDROCHLORIDE 50 MG/ML
25 INJECTION INTRAMUSCULAR; INTRAVENOUS EVERY 6 HOURS PRN
Status: DISCONTINUED | OUTPATIENT
Start: 2023-03-24 | End: 2023-03-28 | Stop reason: HOSPADM

## 2023-03-24 RX ORDER — METOPROLOL TARTRATE 25 MG/1
25 TABLET, FILM COATED ORAL 2 TIMES DAILY
Status: DISCONTINUED | OUTPATIENT
Start: 2023-03-24 | End: 2023-03-25

## 2023-03-24 RX ORDER — OXYCODONE HYDROCHLORIDE 5 MG/1
5 TABLET ORAL EVERY 6 HOURS PRN
Status: DISCONTINUED | OUTPATIENT
Start: 2023-03-24 | End: 2023-03-28 | Stop reason: HOSPADM

## 2023-03-24 RX ORDER — ONDANSETRON 8 MG/1
8 TABLET, ORALLY DISINTEGRATING ORAL EVERY 6 HOURS PRN
Status: DISCONTINUED | OUTPATIENT
Start: 2023-03-24 | End: 2023-03-28 | Stop reason: HOSPADM

## 2023-03-24 RX ORDER — LOSARTAN POTASSIUM 25 MG/1
25 TABLET ORAL DAILY
Status: DISCONTINUED | OUTPATIENT
Start: 2023-03-24 | End: 2023-03-28 | Stop reason: HOSPADM

## 2023-03-24 RX ORDER — LANOLIN ALCOHOL/MO/W.PET/CERES
400 CREAM (GRAM) TOPICAL ONCE
Status: COMPLETED | OUTPATIENT
Start: 2023-03-24 | End: 2023-03-24

## 2023-03-24 RX ORDER — PANTOPRAZOLE SODIUM 40 MG/1
40 TABLET, DELAYED RELEASE ORAL DAILY
Status: DISCONTINUED | OUTPATIENT
Start: 2023-03-24 | End: 2023-03-28 | Stop reason: HOSPADM

## 2023-03-24 RX ORDER — PROCHLORPERAZINE EDISYLATE 5 MG/ML
10 INJECTION INTRAMUSCULAR; INTRAVENOUS EVERY 6 HOURS PRN
Status: DISCONTINUED | OUTPATIENT
Start: 2023-03-24 | End: 2023-03-28 | Stop reason: HOSPADM

## 2023-03-24 RX ORDER — FUROSEMIDE 20 MG/1
20 TABLET ORAL DAILY
Status: DISCONTINUED | OUTPATIENT
Start: 2023-03-24 | End: 2023-03-28 | Stop reason: HOSPADM

## 2023-03-24 RX ORDER — SODIUM CHLORIDE, SODIUM LACTATE, POTASSIUM CHLORIDE, CALCIUM CHLORIDE 600; 310; 30; 20 MG/100ML; MG/100ML; MG/100ML; MG/100ML
INJECTION, SOLUTION INTRAVENOUS CONTINUOUS
Status: DISCONTINUED | OUTPATIENT
Start: 2023-03-24 | End: 2023-03-27

## 2023-03-24 RX ADMIN — LOSARTAN POTASSIUM 25 MG: 25 TABLET, FILM COATED ORAL at 09:03

## 2023-03-24 RX ADMIN — SODIUM CHLORIDE, SODIUM LACTATE, POTASSIUM CHLORIDE, AND CALCIUM CHLORIDE: .6; .31; .03; .02 INJECTION, SOLUTION INTRAVENOUS at 05:03

## 2023-03-24 RX ADMIN — KETOROLAC TROMETHAMINE 15 MG: 30 INJECTION, SOLUTION INTRAMUSCULAR; INTRAVENOUS at 01:03

## 2023-03-24 RX ADMIN — ONDANSETRON 8 MG: 8 TABLET, ORALLY DISINTEGRATING ORAL at 09:03

## 2023-03-24 RX ADMIN — METOPROLOL TARTRATE 25 MG: 25 TABLET, FILM COATED ORAL at 09:03

## 2023-03-24 RX ADMIN — OXYCODONE HYDROCHLORIDE 5 MG: 5 TABLET ORAL at 09:03

## 2023-03-24 RX ADMIN — LEVOTHYROXINE SODIUM 125 MCG: 75 TABLET ORAL at 09:03

## 2023-03-24 RX ADMIN — ONDANSETRON 8 MG: 8 TABLET, ORALLY DISINTEGRATING ORAL at 01:03

## 2023-03-24 RX ADMIN — METOROPROLOL TARTRATE 5 MG: 5 INJECTION, SOLUTION INTRAVENOUS at 06:03

## 2023-03-24 RX ADMIN — ONDANSETRON 4 MG: 2 INJECTION INTRAMUSCULAR; INTRAVENOUS at 09:03

## 2023-03-24 RX ADMIN — KETOROLAC TROMETHAMINE 15 MG: 30 INJECTION, SOLUTION INTRAMUSCULAR; INTRAVENOUS at 06:03

## 2023-03-24 RX ADMIN — HYDRALAZINE HYDROCHLORIDE 10 MG: 20 INJECTION, SOLUTION INTRAMUSCULAR; INTRAVENOUS at 09:03

## 2023-03-24 RX ADMIN — HYDRALAZINE HYDROCHLORIDE 10 MG: 20 INJECTION, SOLUTION INTRAMUSCULAR; INTRAVENOUS at 11:03

## 2023-03-24 RX ADMIN — PANTOPRAZOLE SODIUM 40 MG: 40 TABLET, DELAYED RELEASE ORAL at 09:03

## 2023-03-24 RX ADMIN — APIXABAN 2.5 MG: 2.5 TABLET, FILM COATED ORAL at 09:03

## 2023-03-24 RX ADMIN — PHENOBARBITAL 97.2 MG: 32.4 TABLET ORAL at 09:03

## 2023-03-24 RX ADMIN — OXCARBAZEPINE 150 MG: 150 TABLET, FILM COATED ORAL at 09:03

## 2023-03-24 RX ADMIN — METOROPROLOL TARTRATE 5 MG: 5 INJECTION, SOLUTION INTRAVENOUS at 01:03

## 2023-03-24 RX ADMIN — Medication 400 MG: at 11:03

## 2023-03-24 RX ADMIN — APIXABAN 2.5 MG: 2.5 TABLET, FILM COATED ORAL at 11:03

## 2023-03-24 RX ADMIN — FUROSEMIDE 20 MG: 20 TABLET ORAL at 09:03

## 2023-03-24 NOTE — PROGRESS NOTES
Progress Note  General Surgery    Admit Date: 3/22/2023  S/P: Procedure(s) (LRB):  ROBOTIC REPAIR, HERNIA, VENTRAL (N/A)    Post-operative Day: 1 Day Post-Op    Hospital Day: 3    SUBJECTIVE:     No acute events overnight. Patient states pain is controlled  OBJECTIVE:     Vital Signs (Most Recent)  Temp: 97.2 °F (36.2 °C) (03/24/23 0432)  Pulse: 90 (03/24/23 0701)  Resp: 18 (03/24/23 0432)  BP: (!) 148/67 (03/24/23 0432)  SpO2: 100 % (03/24/23 0432)    Vital Signs Range (Last 24H):  Temp:  [97.1 °F (36.2 °C)-98.1 °F (36.7 °C)]   Pulse:  [63-92]   Resp:  [14-18]   BP: (131-187)/(58-74)   SpO2:  [95 %-100 %]     I & O (Last 24H):  Intake/Output Summary (Last 24 hours) at 3/24/2023 0749  Last data filed at 3/24/2023 0400  Gross per 24 hour   Intake 1600 ml   Output 230 ml   Net 1370 ml       Physical Exam:  Gen: NAD   HEENT: NCAT  Pulm: unlabored, symmetrical   Abd: Soft, nttp. No rebound, guarding.     Wound/Incision:  clean, dry, intact    Laboratory:  Labs within the past 24 hours have been reviewed.    ASSESSMENT/PLAN:     Assessment/Plan:  Restart home meds  Full liquid diet    Juan Mcarthur M.D., F.A.C.S.  Fqfebq-Wwbrmgdmh-Lxilisg and General Surgery  [facility]

## 2023-03-24 NOTE — PLAN OF CARE
"   03/23/23 1231   Post-Acute Status   Post-Acute Authorization Placement   Post-Acute Placement Status Pending medical clearance/testing  (Pt to return to [facility] upon DC. Procedure today. Pending medical clearance for return.)   Discharge Delays None known at this time   Discharge Plan   Discharge Plan A Return to nursing home       Updated clinicals sent to facility. Discussed with Millie and pt would not be able to return until Monday.      Future Appointments   Date Time Provider Department Center   4/5/2023  3:00 PM Chris Johnson MD [facility]UR [facility]   4/6/2023  9:00 AM [address] NUCLEAR [facility]   4/6/2023  9:30 AM CARDIOLOGY, STRESS/TILT TABLE/REDD [facility] CARD [facility]   4/6/2023 10:30 AM [address] [facility]       BP (!) 143/60 (BP Location: Right arm, Patient Position: Lying)   Pulse 72   Temp 97.8 °F (36.6 °C) (Oral)   Resp 18   Ht 4' 9" (1.448 m)   Wt 65.8 kg (145 lb 1 oz)   LMP  (LMP Unknown)   SpO2 100%   BMI 31.39 kg/m²    enoxaparin  40 mg Subcutaneous Daily    levothyroxine  125 mcg Oral Daily    metoprolol  5 mg Intravenous Q6H    OXcarbazepine  150 mg Oral Nightly    PHENobarbitaL  97.2 mg Oral QHS       "

## 2023-03-24 NOTE — PHYSICIAN QUERY
PT Name: Annette Garcia  MR #: [id number]    DOCUMENTATION CLARIFICATION     CDS: Brandy Capley, RN  Email: [email or web address]  This form is a permanent document in the medical record.     Query Date: March 24, 2023    By submitting this query, we are merely seeking further clarification of documentation.  Please utilize your independent clinical judgment when addressing the question(s) below.      The Medical Record reflects the following:  Clinical Information Location in Medical Record     Plan to admit for IV antibiotics in the setting of complicated UTI.    Urinalysis, Reflex to Urine Culture Urine, Clean Catch(!)  C/w infection.  Most recent positive cultures from October of 2022 were resistant to gentamicin, levofloxacin and Cipro only.  Will treat with cefepime at this time due to multiple medication allergies.    FINAL IMPRESSION    Final diagnoses:  [Acute cystitis without hematuria (Primary)       ED provider notes 3/22   ceFEPIme (MAXIPIME) 2 g in dextrose 5 % in water (D5W) 5 % 50 mL IVPB (MB+)  Dose: 2 g  Freq: ED 1 Time Route: IV  Indications of Use: urinary tract infection  Start: 03/22/23 0600 End: 03/22/23 0640 MAR 3/22    03/22/23 05:14   Specimen UA Urine, Clean Catch   Color, UA Yellow   Appearance, UA Hazy !   Specific Gravity, UA 1.015   pH, UA 7.0   Protein, UA Trace !   Glucose, UA Negative   Ketones, UA Negative   Occult Blood UA Trace !   NITRITE UA Negative   UROBILINOGEN UA Negative   Bilirubin (UA) Negative   Leukocytes, UA 3+ !   RBC, UA 7 (H)   WBC, UA >100 (H)   Bacteria, UA Moderate !   Squam Epithel, UA 2   Microscopic Comment SEE COMMENT     GRAM NEGATIVE SAMEERA >100,000 cfu/ml     WBC: 11.42    Nonspecific urinary bladder wall thickening, for which correlation for signs/symptoms of cystitis would be recommended.   UA 3/22                                                          Urine culture 3/22    Labs 3/22    CT 3/22     Please clarify/confirm the Emergency Medicine diagnosis of  ___acute cystitis__?     [   ] Diagnosis ruled in   [   ] Diagnosis ruled out   [   ] Other diagnosis (please specify): _____________   [ x ] Clinically undetermined

## 2023-03-24 NOTE — PLAN OF CARE
Problem: Fall Injury Risk  Goal: Absence of Fall and Fall-Related Injury  Outcome: Ongoing, Progressing     Problem: Pain (Intestinal Obstruction)  Goal: Acceptable Pain Control  Outcome: Ongoing, Progressing     Problem: Seizure Disorder Comorbidity  Goal: Maintenance of Seizure Control  Outcome: Ongoing, Progressing

## 2023-03-24 NOTE — NURSING
Shift assessment done. Tele monitor on. Pt looks lethargic post-op but oriented to questions. Bed alarm set. Colostomy clean and intact. 4 laps site, open to air. Door open.

## 2023-03-25 LAB
ANION GAP SERPL CALC-SCNC: 15 MMOL/L (ref 8–16)
BACTERIA #/AREA URNS HPF: ABNORMAL /HPF
BASOPHILS # BLD AUTO: 0.01 K/UL (ref 0–0.2)
BASOPHILS NFR BLD: 0.1 % (ref 0–1.9)
BILIRUB UR QL STRIP: NEGATIVE
BUN SERPL-MCNC: 27 MG/DL (ref 8–23)
CALCIUM SERPL-MCNC: 9.5 MG/DL (ref 8.7–10.5)
CHLORIDE SERPL-SCNC: 92 MMOL/L (ref 95–110)
CLARITY UR: CLEAR
CO2 SERPL-SCNC: 30 MMOL/L (ref 23–29)
COLOR UR: YELLOW
CREAT SERPL-MCNC: 0.8 MG/DL (ref 0.5–1.4)
DIFFERENTIAL METHOD: ABNORMAL
EOSINOPHIL # BLD AUTO: 0 K/UL (ref 0–0.5)
EOSINOPHIL NFR BLD: 0 % (ref 0–8)
ERYTHROCYTE [DISTWIDTH] IN BLOOD BY AUTOMATED COUNT: 13.9 % (ref 11.5–14.5)
EST. GFR  (NO RACE VARIABLE): >60 ML/MIN/1.73 M^2
GLUCOSE SERPL-MCNC: 167 MG/DL (ref 70–110)
GLUCOSE UR QL STRIP: NEGATIVE
HCT VFR BLD AUTO: 30.8 % (ref 37–48.5)
HGB BLD-MCNC: 10.2 G/DL (ref 12–16)
HGB UR QL STRIP: ABNORMAL
HYALINE CASTS #/AREA URNS LPF: 0 /LPF
IMM GRANULOCYTES # BLD AUTO: 0.05 K/UL (ref 0–0.04)
IMM GRANULOCYTES NFR BLD AUTO: 0.3 % (ref 0–0.5)
KETONES UR QL STRIP: ABNORMAL
LEUKOCYTE ESTERASE UR QL STRIP: NEGATIVE
LYMPHOCYTES # BLD AUTO: 1 K/UL (ref 1–4.8)
LYMPHOCYTES NFR BLD: 6.6 % (ref 18–48)
MCH RBC QN AUTO: 32.1 PG (ref 27–31)
MCHC RBC AUTO-ENTMCNC: 33.1 G/DL (ref 32–36)
MCV RBC AUTO: 97 FL (ref 82–98)
MICROSCOPIC COMMENT: ABNORMAL
MONOCYTES # BLD AUTO: 1 K/UL (ref 0.3–1)
MONOCYTES NFR BLD: 6.8 % (ref 4–15)
NEUTROPHILS # BLD AUTO: 12.5 K/UL (ref 1.8–7.7)
NEUTROPHILS NFR BLD: 86.2 % (ref 38–73)
NITRITE UR QL STRIP: NEGATIVE
NRBC BLD-RTO: 0 /100 WBC
PH UR STRIP: 7 [PH] (ref 5–8)
PLATELET # BLD AUTO: 218 K/UL (ref 150–450)
PMV BLD AUTO: 9.9 FL (ref 9.2–12.9)
POTASSIUM SERPL-SCNC: 3.5 MMOL/L (ref 3.5–5.1)
PROT UR QL STRIP: ABNORMAL
RBC # BLD AUTO: 3.18 M/UL (ref 4–5.4)
RBC #/AREA URNS HPF: 15 /HPF (ref 0–4)
SODIUM SERPL-SCNC: 137 MMOL/L (ref 136–145)
SP GR UR STRIP: 1.02 (ref 1–1.03)
URN SPEC COLLECT METH UR: ABNORMAL
UROBILINOGEN UR STRIP-ACNC: NEGATIVE EU/DL
WBC # BLD AUTO: 14.47 K/UL (ref 3.9–12.7)
WBC #/AREA URNS HPF: 10 /HPF (ref 0–5)

## 2023-03-25 PROCEDURE — 36415 COLL VENOUS BLD VENIPUNCTURE: CPT | Performed by: SURGERY

## 2023-03-25 PROCEDURE — 25000003 PHARM REV CODE 250: Performed by: SURGERY

## 2023-03-25 PROCEDURE — 25000003 PHARM REV CODE 250: Performed by: STUDENT IN AN ORGANIZED HEALTH CARE EDUCATION/TRAINING PROGRAM

## 2023-03-25 PROCEDURE — 99900035 HC TECH TIME PER 15 MIN (STAT)

## 2023-03-25 PROCEDURE — 63600175 PHARM REV CODE 636 W HCPCS: Performed by: STUDENT IN AN ORGANIZED HEALTH CARE EDUCATION/TRAINING PROGRAM

## 2023-03-25 PROCEDURE — 63600175 PHARM REV CODE 636 W HCPCS

## 2023-03-25 PROCEDURE — 11000001 HC ACUTE MED/SURG PRIVATE ROOM

## 2023-03-25 PROCEDURE — 81000 URINALYSIS NONAUTO W/SCOPE: CPT | Performed by: STUDENT IN AN ORGANIZED HEALTH CARE EDUCATION/TRAINING PROGRAM

## 2023-03-25 PROCEDURE — 27000221 HC OXYGEN, UP TO 24 HOURS

## 2023-03-25 PROCEDURE — 85025 COMPLETE CBC W/AUTO DIFF WBC: CPT | Performed by: SURGERY

## 2023-03-25 PROCEDURE — 80048 BASIC METABOLIC PNL TOTAL CA: CPT | Performed by: SURGERY

## 2023-03-25 PROCEDURE — 25000003 PHARM REV CODE 250

## 2023-03-25 PROCEDURE — 94761 N-INVAS EAR/PLS OXIMETRY MLT: CPT

## 2023-03-25 RX ORDER — METOPROLOL TARTRATE 1 MG/ML
5 INJECTION, SOLUTION INTRAVENOUS EVERY 6 HOURS
Status: DISCONTINUED | OUTPATIENT
Start: 2023-03-25 | End: 2023-03-26

## 2023-03-25 RX ADMIN — SODIUM CHLORIDE, SODIUM LACTATE, POTASSIUM CHLORIDE, AND CALCIUM CHLORIDE: .6; .31; .03; .02 INJECTION, SOLUTION INTRAVENOUS at 08:03

## 2023-03-25 RX ADMIN — METOPROLOL TARTRATE 5 MG: 1 INJECTION, SOLUTION INTRAVENOUS at 12:03

## 2023-03-25 RX ADMIN — HYDRALAZINE HYDROCHLORIDE 10 MG: 20 INJECTION, SOLUTION INTRAMUSCULAR; INTRAVENOUS at 09:03

## 2023-03-25 RX ADMIN — METOPROLOL TARTRATE 5 MG: 1 INJECTION, SOLUTION INTRAVENOUS at 05:03

## 2023-03-25 RX ADMIN — APIXABAN 2.5 MG: 2.5 TABLET, FILM COATED ORAL at 08:03

## 2023-03-25 RX ADMIN — OXCARBAZEPINE 150 MG: 150 TABLET, FILM COATED ORAL at 08:03

## 2023-03-25 RX ADMIN — LOSARTAN POTASSIUM 25 MG: 25 TABLET, FILM COATED ORAL at 08:03

## 2023-03-25 RX ADMIN — FUROSEMIDE 20 MG: 20 TABLET ORAL at 08:03

## 2023-03-25 RX ADMIN — PROCHLORPERAZINE EDISYLATE 10 MG: 5 INJECTION INTRAMUSCULAR; INTRAVENOUS at 01:03

## 2023-03-25 RX ADMIN — PHENOBARBITAL 97.2 MG: 32.4 TABLET ORAL at 08:03

## 2023-03-25 RX ADMIN — LEVOTHYROXINE SODIUM 125 MCG: 75 TABLET ORAL at 05:03

## 2023-03-25 RX ADMIN — PANTOPRAZOLE SODIUM 40 MG: 40 TABLET, DELAYED RELEASE ORAL at 08:03

## 2023-03-25 RX ADMIN — HYDRALAZINE HYDROCHLORIDE 10 MG: 20 INJECTION, SOLUTION INTRAMUSCULAR; INTRAVENOUS at 06:03

## 2023-03-25 NOTE — ASSESSMENT & PLAN NOTE
POD #2 s/p robotic assisted parastomal ventral hernia repair   NPO with ice chips - return of flatus per ostomy yet remains intolerant of PO intake;  Was able to take AM meds today   WBC 14 from 9 today; afebrile   Pt with increased incontinence today;  Obtain UA and CXR   Out of bed to chair - pt reluctant to ambulate  PT/OT   Encourage IS use   Daily labs

## 2023-03-25 NOTE — PLAN OF CARE
Problem: Adult Inpatient Plan of Care  Goal: Optimal Comfort and Wellbeing  Outcome: Ongoing, Progressing     Problem: Fluid Deficit (Intestinal Obstruction)  Goal: Fluid Balance  Outcome: Ongoing, Progressing     Problem: Nausea and Vomiting (Intestinal Obstruction)  Goal: Nausea and Vomiting Relief  Outcome: Ongoing, Progressing     Problem: Hypertension Comorbidity  Goal: Blood Pressure in Desired Range  Outcome: Ongoing, Progressing

## 2023-03-25 NOTE — NURSING
In and out urinary cath completed. Dark mal odorous urine noted on return. Approximately 125cc of urine noted. Pt tolerated well, charge nurse ZEINAB Linder present.

## 2023-03-25 NOTE — ASSESSMENT & PLAN NOTE
HR 150s this AM   IV metoprolol 5mg q6 restarted - HR 100s later this AM   Will restart PO beta blocker once PO tolerance improves

## 2023-03-25 NOTE — PROGRESS NOTES
[facility]  General Surgery  Progress Note    Subjective:     History of Present Illness:  No notes on file    Post-Op Info:  Procedure(s) (LRB):  ROBOTIC REPAIR, HERNIA, VENTRAL (N/A)   2 Days Post-Op     Interval History:   Patient seen and examined. Nausea and vomiting overnight with medications.  HR 150s this AM.  Says she feels a little better today.      Medications:  Continuous Infusions:   lactated ringers 100 mL/hr at 03/25/23 0858     Scheduled Meds:   apixaban  2.5 mg Oral BID    furosemide  20 mg Oral Daily    levothyroxine  125 mcg Oral Before breakfast    losartan  25 mg Oral Daily    metoprolol  5 mg Intravenous Q6H    OXcarbazepine  150 mg Oral Nightly    pantoprazole  40 mg Oral Daily    PHENobarbitaL  97.2 mg Oral QHS     PRN Meds:diphenhydrAMINE, hydrALAZINE, ondansetron, oxyCODONE, prochlorperazine     Review of patient's allergies indicates:   Allergen Reactions    Adhesive Itching and Blisters    Penicillins Anaphylaxis    Tramadol Hives    Avelox [moxifloxacin] Rash     Facial and arm itching and redness. Pt states throat closes when given.    Amoxil [amoxicillin]     Aspridrox [aspirin, buffered]     Codeine Other (See Comments)     Throat swelling    Keflex [cephalexin]      Tolerated cefepime and cefazolin    Norvasc [amlodipine]     Red dye Hives    Robitussin [guaifenesin]     Sulfa (sulfonamide antibiotics)     Tylenol [acetaminophen]      Has reaction to Tylenol with red dye and unable to take Extra Strength Tylenol/ CAN ONLY TOLERATE REG STRENGTH TYLENOL    Vicks vaporub [camphor-eucalyptus oil-menthol]      Objective:     Vital Signs (Most Recent):  Temp: 97.4 °F (36.3 °C) (03/25/23 1132)  Pulse: 85 (03/25/23 1221)  Resp: 19 (03/25/23 1132)  BP: 126/73 (03/25/23 1221)  SpO2: (!) 94 % (03/25/23 1132)   Vital Signs (24h Range):  Temp:  [96.7 °F (35.9 °C)-98.3 °F (36.8 °C)] 97.4 °F (36.3 °C)  Pulse:  [] 85  Resp:  [18-20] 19  SpO2:  [93 %-96 %] 94  %  BP: (126-201)/(66-91) 126/73     Weight: 65.8 kg (145 lb 1 oz)  Body mass index is 31.39 kg/m².    Intake/Output - Last 3 Shifts         03/23 0700 03/24 0659 03/24 0700 03/25 0659 03/25 0700 03/26 0659    IV Piggyback 1600      Total Intake(mL/kg) 1600 (24.3)      Urine (mL/kg/hr) 230 (0.1) 0 (0)     Emesis/NG output 0 0     Other 0 0     Stool 0 0     Blood 0 0     Total Output 230 0     Net +1370 0            Urine Occurrence 1 x 1 x 1 x    Stool Occurrence 0 x 0 x     Emesis Occurrence 0 x 3 x             Physical Exam  Constitutional:       General: She is not in acute distress.     Appearance: She is not ill-appearing or toxic-appearing.   Cardiovascular:      Rate and Rhythm: Normal rate and regular rhythm.   Pulmonary:      Effort: Pulmonary effort is normal. No respiratory distress.   Abdominal:      General: There is distension (yet compressible).      Palpations: Abdomen is soft.      Tenderness: There is no abdominal tenderness. There is no guarding.      Hernia: No hernia is present.      Comments: LLQ ostomy pink, patent and viable with small air in bag   Skin:     Capillary Refill: Capillary refill takes less than 2 seconds.   Neurological:      Mental Status: She is alert. Mental status is at baseline.       Significant Labs:  I have reviewed all pertinent lab results within the past 24 hours.  CBC:   Recent Labs   Lab 03/25/23  0615   WBC 14.47*   RBC 3.18*   HGB 10.2*   HCT 30.8*      MCV 97   MCH 32.1*   MCHC 33.1     CMP:   Recent Labs   Lab 03/22/23  0332 03/24/23  0517 03/25/23  0615   *   < > 167*   CALCIUM 10.2   < > 9.5   ALBUMIN 4.3  --   --    PROT 7.9  --   --    *   < > 137   K 4.6   < > 3.5   CO2 23   < > 30*   CL 93*   < > 92*   BUN 14   < > 27*   CREATININE 0.9   < > 0.8   ALKPHOS 99  --   --    ALT 12  --   --    AST 18  --   --    BILITOT 0.3  --   --     < > = values in this interval not displayed.       Significant Diagnostics:  I have reviewed all  pertinent imaging results/findings within the past 24 hours.    Assessment/Plan:     * Parastomal hernia with obstruction and without gangrene  POD #2 s/p robotic assisted parastomal ventral hernia repair   NPO with ice chips - return of flatus per ostomy yet remains intolerant of PO intake;  Was able to take AM meds today   WBC 14 from 9 today; afebrile   Pt with increased incontinence today;  Obtain UA and CXR   Out of bed to chair - pt reluctant to ambulate  PT/OT   Encourage IS use   Daily labs    Chronic anticoagulation  Apixaban restarted     Atrial fibrillation  HR 150s this AM   IV metoprolol 5mg q6 restarted - HR 100s later this AM   Will restart PO beta blocker once PO tolerance improves     Seizure disorder  PO phenobarb started yesterday  Tolerated meds this AM         Asia Paredes MD  General Surgery  [place] - Telemetry

## 2023-03-25 NOTE — NURSING
Pt's HR sustaining 150s [id number]. No telemetry orders. Pt lying in bed, calm. Notified MD on call, orders received. Pt placed back on telemetry. IV metoprolol given. HR back to NSR.

## 2023-03-25 NOTE — PLAN OF CARE
03/25/23 1258   Sepsis Screen (IP)   Is the patient's history or complaint suggestive of a possible infection? No   Are there at least two of the following signs and symptoms present? No   Sepsis signs/symptoms - WBC WBC < 4,000 or WBC > 12,000   Are any of the following organ dysfunction criteria present and not considered to be due to a chronic condition? No   Initiate Sepsis Protocol No   Sepsis screen complete; criteria not met to initiate timer.

## 2023-03-25 NOTE — SUBJECTIVE & OBJECTIVE
Interval History:   Patient seen and examined. Nausea and vomiting overnight with medications.  HR 150s this AM.  Says she feels a little better today.      Medications:  Continuous Infusions:   lactated ringers 100 mL/hr at 03/25/23 0858     Scheduled Meds:   apixaban  2.5 mg Oral BID    furosemide  20 mg Oral Daily    levothyroxine  125 mcg Oral Before breakfast    losartan  25 mg Oral Daily    metoprolol  5 mg Intravenous Q6H    OXcarbazepine  150 mg Oral Nightly    pantoprazole  40 mg Oral Daily    PHENobarbitaL  97.2 mg Oral QHS     PRN Meds:diphenhydrAMINE, hydrALAZINE, ondansetron, oxyCODONE, prochlorperazine     Review of patient's allergies indicates:   Allergen Reactions    Adhesive Itching and Blisters    Penicillins Anaphylaxis    Tramadol Hives    Avelox [moxifloxacin] Rash     Facial and arm itching and redness. Pt states throat closes when given.    Amoxil [amoxicillin]     Aspridrox [aspirin, buffered]     Codeine Other (See Comments)     Throat swelling    Keflex [cephalexin]      Tolerated cefepime and cefazolin    Norvasc [amlodipine]     Red dye Hives    Robitussin [guaifenesin]     Sulfa (sulfonamide antibiotics)     Tylenol [acetaminophen]      Has reaction to Tylenol with red dye and unable to take Extra Strength Tylenol/ CAN ONLY TOLERATE REG STRENGTH TYLENOL    Vicks vaporub [camphor-eucalyptus oil-menthol]      Objective:     Vital Signs (Most Recent):  Temp: 97.4 °F (36.3 °C) (03/25/23 1132)  Pulse: 85 (03/25/23 1221)  Resp: 19 (03/25/23 1132)  BP: 126/73 (03/25/23 1221)  SpO2: (!) 94 % (03/25/23 1132)   Vital Signs (24h Range):  Temp:  [96.7 °F (35.9 °C)-98.3 °F (36.8 °C)] 97.4 °F (36.3 °C)  Pulse:  [] 85  Resp:  [18-20] 19  SpO2:  [93 %-96 %] 94 %  BP: (126-201)/(66-91) 126/73     Weight: 65.8 kg (145 lb 1 oz)  Body mass index is 31.39 kg/m².    Intake/Output - Last 3 Shifts         03/23 0700 03/24 0659 03/24 0700 03/25 0659 03/25 0700 03/26 0659    IV Piggyback 1600      Total  Intake(mL/kg) 1600 (24.3)      Urine (mL/kg/hr) 230 (0.1) 0 (0)     Emesis/NG output 0 0     Other 0 0     Stool 0 0     Blood 0 0     Total Output 230 0     Net +1370 0            Urine Occurrence 1 x 1 x 1 x    Stool Occurrence 0 x 0 x     Emesis Occurrence 0 x 3 x             Physical Exam  Constitutional:       General: She is not in acute distress.     Appearance: She is not ill-appearing or toxic-appearing.   Cardiovascular:      Rate and Rhythm: Normal rate and regular rhythm.   Pulmonary:      Effort: Pulmonary effort is normal. No respiratory distress.   Abdominal:      General: There is distension (yet compressible).      Palpations: Abdomen is soft.      Tenderness: There is no abdominal tenderness. There is no guarding.      Hernia: No hernia is present.      Comments: LLQ ostomy pink, patent and viable with small air in bag   Skin:     Capillary Refill: Capillary refill takes less than 2 seconds.   Neurological:      Mental Status: She is alert. Mental status is at baseline.       Significant Labs:  I have reviewed all pertinent lab results within the past 24 hours.  CBC:   Recent Labs   Lab 03/25/23  0615   WBC 14.47*   RBC 3.18*   HGB 10.2*   HCT 30.8*      MCV 97   MCH 32.1*   MCHC 33.1     CMP:   Recent Labs   Lab 03/22/23  0332 03/24/23  0517 03/25/23  0615   *   < > 167*   CALCIUM 10.2   < > 9.5   ALBUMIN 4.3  --   --    PROT 7.9  --   --    *   < > 137   K 4.6   < > 3.5   CO2 23   < > 30*   CL 93*   < > 92*   BUN 14   < > 27*   CREATININE 0.9   < > 0.8   ALKPHOS 99  --   --    ALT 12  --   --    AST 18  --   --    BILITOT 0.3  --   --     < > = values in this interval not displayed.       Significant Diagnostics:  I have reviewed all pertinent imaging results/findings within the past 24 hours.

## 2023-03-26 LAB
ALBUMIN SERPL BCP-MCNC: 3.1 G/DL (ref 3.5–5.2)
ANION GAP SERPL CALC-SCNC: 11 MMOL/L (ref 8–16)
BACTERIA UR CULT: NORMAL
BACTERIA UR CULT: NORMAL
BUN SERPL-MCNC: 29 MG/DL (ref 8–23)
CALCIUM SERPL-MCNC: 8.8 MG/DL (ref 8.7–10.5)
CHLORIDE SERPL-SCNC: 97 MMOL/L (ref 95–110)
CO2 SERPL-SCNC: 28 MMOL/L (ref 23–29)
CREAT SERPL-MCNC: 0.6 MG/DL (ref 0.5–1.4)
ERYTHROCYTE [DISTWIDTH] IN BLOOD BY AUTOMATED COUNT: 14.1 % (ref 11.5–14.5)
EST. GFR  (NO RACE VARIABLE): >60 ML/MIN/1.73 M^2
GLUCOSE SERPL-MCNC: 113 MG/DL (ref 70–110)
HCT VFR BLD AUTO: 27.8 % (ref 37–48.5)
HGB BLD-MCNC: 9.2 G/DL (ref 12–16)
MAGNESIUM SERPL-MCNC: 1.5 MG/DL (ref 1.6–2.6)
MCH RBC QN AUTO: 32.4 PG (ref 27–31)
MCHC RBC AUTO-ENTMCNC: 33.1 G/DL (ref 32–36)
MCV RBC AUTO: 98 FL (ref 82–98)
PHOSPHATE SERPL-MCNC: 1.6 MG/DL (ref 2.7–4.5)
PLATELET # BLD AUTO: 173 K/UL (ref 150–450)
PMV BLD AUTO: 10 FL (ref 9.2–12.9)
POTASSIUM SERPL-SCNC: 3.3 MMOL/L (ref 3.5–5.1)
RBC # BLD AUTO: 2.84 M/UL (ref 4–5.4)
SODIUM SERPL-SCNC: 136 MMOL/L (ref 136–145)
WBC # BLD AUTO: 8.69 K/UL (ref 3.9–12.7)

## 2023-03-26 PROCEDURE — 25000003 PHARM REV CODE 250: Performed by: SURGERY

## 2023-03-26 PROCEDURE — 25000003 PHARM REV CODE 250

## 2023-03-26 PROCEDURE — 25000003 PHARM REV CODE 250: Performed by: STUDENT IN AN ORGANIZED HEALTH CARE EDUCATION/TRAINING PROGRAM

## 2023-03-26 PROCEDURE — 80069 RENAL FUNCTION PANEL: CPT | Performed by: STUDENT IN AN ORGANIZED HEALTH CARE EDUCATION/TRAINING PROGRAM

## 2023-03-26 PROCEDURE — 83735 ASSAY OF MAGNESIUM: CPT | Performed by: STUDENT IN AN ORGANIZED HEALTH CARE EDUCATION/TRAINING PROGRAM

## 2023-03-26 PROCEDURE — 94761 N-INVAS EAR/PLS OXIMETRY MLT: CPT

## 2023-03-26 PROCEDURE — 11000001 HC ACUTE MED/SURG PRIVATE ROOM

## 2023-03-26 PROCEDURE — 99900035 HC TECH TIME PER 15 MIN (STAT)

## 2023-03-26 PROCEDURE — 85027 COMPLETE CBC AUTOMATED: CPT | Performed by: STUDENT IN AN ORGANIZED HEALTH CARE EDUCATION/TRAINING PROGRAM

## 2023-03-26 PROCEDURE — 36415 COLL VENOUS BLD VENIPUNCTURE: CPT | Performed by: STUDENT IN AN ORGANIZED HEALTH CARE EDUCATION/TRAINING PROGRAM

## 2023-03-26 PROCEDURE — 97530 THERAPEUTIC ACTIVITIES: CPT

## 2023-03-26 PROCEDURE — 63600175 PHARM REV CODE 636 W HCPCS

## 2023-03-26 PROCEDURE — 97165 OT EVAL LOW COMPLEX 30 MIN: CPT

## 2023-03-26 PROCEDURE — 97161 PT EVAL LOW COMPLEX 20 MIN: CPT

## 2023-03-26 RX ORDER — LANOLIN ALCOHOL/MO/W.PET/CERES
400 CREAM (GRAM) TOPICAL DAILY
Status: DISCONTINUED | OUTPATIENT
Start: 2023-03-26 | End: 2023-03-28 | Stop reason: HOSPADM

## 2023-03-26 RX ORDER — METOPROLOL TARTRATE 1 MG/ML
5 INJECTION, SOLUTION INTRAVENOUS EVERY 6 HOURS PRN
Status: DISCONTINUED | OUTPATIENT
Start: 2023-03-26 | End: 2023-03-28 | Stop reason: HOSPADM

## 2023-03-26 RX ORDER — METOPROLOL TARTRATE 25 MG/1
25 TABLET, FILM COATED ORAL 2 TIMES DAILY
Status: DISCONTINUED | OUTPATIENT
Start: 2023-03-26 | End: 2023-03-28 | Stop reason: HOSPADM

## 2023-03-26 RX ADMIN — METOPROLOL TARTRATE 5 MG: 1 INJECTION, SOLUTION INTRAVENOUS at 12:03

## 2023-03-26 RX ADMIN — OXCARBAZEPINE 150 MG: 150 TABLET, FILM COATED ORAL at 09:03

## 2023-03-26 RX ADMIN — FUROSEMIDE 20 MG: 20 TABLET ORAL at 08:03

## 2023-03-26 RX ADMIN — LOSARTAN POTASSIUM 25 MG: 25 TABLET, FILM COATED ORAL at 08:03

## 2023-03-26 RX ADMIN — SODIUM CHLORIDE, SODIUM LACTATE, POTASSIUM CHLORIDE, AND CALCIUM CHLORIDE: .6; .31; .03; .02 INJECTION, SOLUTION INTRAVENOUS at 08:03

## 2023-03-26 RX ADMIN — POTASSIUM PHOSPHATE, MONOBASIC 500 MG: 500 TABLET, SOLUBLE ORAL at 04:03

## 2023-03-26 RX ADMIN — LEVOTHYROXINE SODIUM 125 MCG: 75 TABLET ORAL at 05:03

## 2023-03-26 RX ADMIN — Medication 400 MG: at 04:03

## 2023-03-26 RX ADMIN — METOPROLOL TARTRATE 25 MG: 25 TABLET, FILM COATED ORAL at 09:03

## 2023-03-26 RX ADMIN — SODIUM CHLORIDE, SODIUM LACTATE, POTASSIUM CHLORIDE, AND CALCIUM CHLORIDE: .6; .31; .03; .02 INJECTION, SOLUTION INTRAVENOUS at 07:03

## 2023-03-26 RX ADMIN — APIXABAN 2.5 MG: 2.5 TABLET, FILM COATED ORAL at 08:03

## 2023-03-26 RX ADMIN — PHENOBARBITAL 97.2 MG: 32.4 TABLET ORAL at 09:03

## 2023-03-26 RX ADMIN — PANTOPRAZOLE SODIUM 40 MG: 40 TABLET, DELAYED RELEASE ORAL at 08:03

## 2023-03-26 RX ADMIN — METOPROLOL TARTRATE 5 MG: 1 INJECTION, SOLUTION INTRAVENOUS at 06:03

## 2023-03-26 RX ADMIN — APIXABAN 2.5 MG: 2.5 TABLET, FILM COATED ORAL at 09:03

## 2023-03-26 NOTE — SUBJECTIVE & OBJECTIVE
Interval History:   Patient seen and examined.  NAEON.  VSS afebrile.  Tolerated PO medications this AM.  Gas in ostomy bag.     Medications:  Continuous Infusions:   lactated ringers 100 mL/hr at 03/26/23 0836     Scheduled Meds:   apixaban  2.5 mg Oral BID    furosemide  20 mg Oral Daily    levothyroxine  125 mcg Oral Before breakfast    losartan  25 mg Oral Daily    metoprolol tartrate  25 mg Oral BID    OXcarbazepine  150 mg Oral Nightly    pantoprazole  40 mg Oral Daily    PHENobarbitaL  97.2 mg Oral QHS     PRN Meds:diphenhydrAMINE, hydrALAZINE, metoprolol, ondansetron, oxyCODONE, prochlorperazine     Review of patient's allergies indicates:   Allergen Reactions    Adhesive Itching and Blisters    Penicillins Anaphylaxis    Tramadol Hives    Avelox [moxifloxacin] Rash     Facial and arm itching and redness. Pt states throat closes when given.    Amoxil [amoxicillin]     Aspridrox [aspirin, buffered]     Codeine Other (See Comments)     Throat swelling    Keflex [cephalexin]      Tolerated cefepime and cefazolin    Norvasc [amlodipine]     Red dye Hives    Robitussin [guaifenesin]     Sulfa (sulfonamide antibiotics)     Tylenol [acetaminophen]      Has reaction to Tylenol with red dye and unable to take Extra Strength Tylenol/ CAN ONLY TOLERATE REG STRENGTH TYLENOL    Vicks vaporub [camphor-eucalyptus oil-menthol]      Objective:     Vital Signs (Most Recent):  Temp: 96.6 °F (35.9 °C) (03/26/23 1216)  Pulse: 88 (03/26/23 1216)  Resp: 18 (03/26/23 1216)  BP: 139/64 (03/26/23 1216)  SpO2: 96 % (03/26/23 1216) Vital Signs (24h Range):  Temp:  [96.3 °F (35.7 °C)-97.7 °F (36.5 °C)] 96.6 °F (35.9 °C)  Pulse:  [74-90] 88  Resp:  [17-20] 18  SpO2:  [93 %-99 %] 96 %  BP: (108-183)/(55-77) 139/64     Weight: 65.8 kg (145 lb 1 oz)  Body mass index is 31.39 kg/m².    Intake/Output - Last 3 Shifts         03/24 0700 03/25 0659 03/25 0700 03/26 0659 03/26 0700 03/27 0659    P.O.  0     IV Piggyback       Total  Intake(mL/kg)  0 (0)     Urine (mL/kg/hr) 0 (0) 300 (0.2)     Emesis/NG output 0      Other 0      Stool 0      Blood 0      Total Output 0 300     Net 0 -300            Urine Occurrence 1 x 1 x     Stool Occurrence 0 x 0 x     Emesis Occurrence 3 x              Physical Exam  Constitutional:       General: She is not in acute distress.     Appearance: She is not ill-appearing or toxic-appearing.   Cardiovascular:      Rate and Rhythm: Regular rhythm. Tachycardia present.   Pulmonary:      Effort: Pulmonary effort is normal. No respiratory distress.   Abdominal:      General: There is no distension.      Palpations: Abdomen is soft.      Tenderness: There is no abdominal tenderness. There is no guarding.      Hernia: No hernia is present.      Comments: LLQ ostomy pink, patent and viable with gas in the bag    Skin:     Capillary Refill: Capillary refill takes less than 2 seconds.   Neurological:      Mental Status: She is alert. Mental status is at baseline.       Significant Labs:  I have reviewed all pertinent lab results within the past 24 hours.  CBC:   Recent Labs   Lab 03/26/23  0942   WBC 8.69   RBC 2.84*   HGB 9.2*   HCT 27.8*      MCV 98   MCH 32.4*   MCHC 33.1     CMP:   Recent Labs   Lab 03/22/23  0332 03/24/23  0517 03/26/23  0942   *   < > 113*   CALCIUM 10.2   < > 8.8   ALBUMIN 4.3  --  3.1*   PROT 7.9  --   --    *   < > 136   K 4.6   < > 3.3*   CO2 23   < > 28   CL 93*   < > 97   BUN 14   < > 29*   CREATININE 0.9   < > 0.6   ALKPHOS 99  --   --    ALT 12  --   --    AST 18  --   --    BILITOT 0.3  --   --     < > = values in this interval not displayed.       Significant Diagnostics:  I have reviewed all pertinent imaging results/findings within the past 24 hours.

## 2023-03-26 NOTE — ASSESSMENT & PLAN NOTE
POD #3 s/p robotic assisted parastomal ventral hernia repair   CLD  WBC normalized.  UA and CXR unremarkable   Out of bed to chair - pt reluctant to ambulate  PT/OT   Encourage IS use - poor effort   Daily labs

## 2023-03-26 NOTE — NURSING
Shift assessment done. Pt denies pain. Bed on low position, wheels locked, bed alarm set. No complains of nausea. No signs of distress.

## 2023-03-26 NOTE — ASSESSMENT & PLAN NOTE
POD #3 s/p robotic assisted parastomal ventral hernia repair   Advance to regular diet  WBC normalized.  UA and CXR unremarkable   Out of bed to chair - pt reluctant to ambulate  PT/OT   Encourage IS use - poor effort   Daily labs  Replace lytes as necessary

## 2023-03-26 NOTE — PROGRESS NOTES
[facility]  General Surgery  Progress Note    Subjective:     History of Present Illness:  No notes on file    Post-Op Info:  Procedure(s) (LRB):  ROBOTIC REPAIR, HERNIA, VENTRAL (N/A)   3 Days Post-Op     Interval History:   Patient seen and examined.  NAEON.  VSS afebrile.  Tolerated PO medications this AM.  Gas in ostomy bag.     Medications:  Continuous Infusions:   lactated ringers 100 mL/hr at 03/26/23 0836     Scheduled Meds:   apixaban  2.5 mg Oral BID    furosemide  20 mg Oral Daily    levothyroxine  125 mcg Oral Before breakfast    losartan  25 mg Oral Daily    metoprolol tartrate  25 mg Oral BID    OXcarbazepine  150 mg Oral Nightly    pantoprazole  40 mg Oral Daily    PHENobarbitaL  97.2 mg Oral QHS     PRN Meds:diphenhydrAMINE, hydrALAZINE, metoprolol, ondansetron, oxyCODONE, prochlorperazine     Review of patient's allergies indicates:   Allergen Reactions    Adhesive Itching and Blisters    Penicillins Anaphylaxis    Tramadol Hives    Avelox [moxifloxacin] Rash     Facial and arm itching and redness. Pt states throat closes when given.    Amoxil [amoxicillin]     Aspridrox [aspirin, buffered]     Codeine Other (See Comments)     Throat swelling    Keflex [cephalexin]      Tolerated cefepime and cefazolin    Norvasc [amlodipine]     Red dye Hives    Robitussin [guaifenesin]     Sulfa (sulfonamide antibiotics)     Tylenol [acetaminophen]      Has reaction to Tylenol with red dye and unable to take Extra Strength Tylenol/ CAN ONLY TOLERATE REG STRENGTH TYLENOL    Vicks vaporub [camphor-eucalyptus oil-menthol]      Objective:     Vital Signs (Most Recent):  Temp: 96.6 °F (35.9 °C) (03/26/23 1216)  Pulse: 88 (03/26/23 1216)  Resp: 18 (03/26/23 1216)  BP: 139/64 (03/26/23 1216)  SpO2: 96 % (03/26/23 1216) Vital Signs (24h Range):  Temp:  [96.3 °F (35.7 °C)-97.7 °F (36.5 °C)] 96.6 °F (35.9 °C)  Pulse:  [74-90] 88  Resp:  [17-20] 18  SpO2:  [93 %-99 %] 96 %  BP: (108-183)/(55-77) 139/64     Weight:  65.8 kg (145 lb 1 oz)  Body mass index is 31.39 kg/m².    Intake/Output - Last 3 Shifts         03/24 0700 03/25 0659 03/25 0700 03/26 0659 03/26 0700 03/27 0659    P.O.  0     IV Piggyback       Total Intake(mL/kg)  0 (0)     Urine (mL/kg/hr) 0 (0) 300 (0.2)     Emesis/NG output 0      Other 0      Stool 0      Blood 0      Total Output 0 300     Net 0 -300            Urine Occurrence 1 x 1 x     Stool Occurrence 0 x 0 x     Emesis Occurrence 3 x              Physical Exam  Constitutional:       General: She is not in acute distress.     Appearance: She is not ill-appearing or toxic-appearing.   Cardiovascular:      Rate and Rhythm: Regular rhythm. Tachycardia present.   Pulmonary:      Effort: Pulmonary effort is normal. No respiratory distress.   Abdominal:      General: There is no distension.      Palpations: Abdomen is soft.      Tenderness: There is no abdominal tenderness. There is no guarding.      Hernia: No hernia is present.      Comments: LLQ ostomy pink, patent and viable with gas in the bag    Skin:     Capillary Refill: Capillary refill takes less than 2 seconds.   Neurological:      Mental Status: She is alert. Mental status is at baseline.       Significant Labs:  I have reviewed all pertinent lab results within the past 24 hours.  CBC:   Recent Labs   Lab 03/26/23  0942   WBC 8.69   RBC 2.84*   HGB 9.2*   HCT 27.8*      MCV 98   MCH 32.4*   MCHC 33.1     CMP:   Recent Labs   Lab 03/22/23  0332 03/24/23  0517 03/26/23  0942   *   < > 113*   CALCIUM 10.2   < > 8.8   ALBUMIN 4.3  --  3.1*   PROT 7.9  --   --    *   < > 136   K 4.6   < > 3.3*   CO2 23   < > 28   CL 93*   < > 97   BUN 14   < > 29*   CREATININE 0.9   < > 0.6   ALKPHOS 99  --   --    ALT 12  --   --    AST 18  --   --    BILITOT 0.3  --   --     < > = values in this interval not displayed.       Significant Diagnostics:  I have reviewed all pertinent imaging results/findings within the past 24  hours.    Assessment/Plan:     * Parastomal hernia with obstruction and without gangrene  POD #3 s/p robotic assisted parastomal ventral hernia repair   CLD  WBC normalized.  UA and CXR unremarkable   Out of bed to chair - pt reluctant to ambulate  PT/OT   Encourage IS use - poor effort   Daily labs  Replace lytes as necessary    Chronic anticoagulation  Apixaban restarted     Atrial fibrillation  PO metoprolol 25mg BID   PRN IV for HR > 120      Seizure disorder  PO phenobarb started yesterday  Tolerated meds this AM         Asia Paredes MD  General Surgery  [facility] - Telemetry

## 2023-03-26 NOTE — PLAN OF CARE
Problem: Fall Injury Risk  Goal: Absence of Fall and Fall-Related Injury  Outcome: Ongoing, Progressing     Problem: Skin Injury Risk Increased  Goal: Skin Health and Integrity  Outcome: Ongoing, Progressing     Problem: Nausea and Vomiting (Intestinal Obstruction)  Goal: Nausea and Vomiting Relief  Outcome: Ongoing, Progressing

## 2023-03-26 NOTE — PLAN OF CARE
Problem: Physical Therapy  Goal: Physical Therapy Goal  Description: Goals to be met by: 23     Patient will increase functional independence with mobility by performin. Supine <> sit with MInimal Assistance  2. Sit to stand transfer with Minimal Assistance  3. Bed to chair transfer with Minimal Assistance using Rolling Walker  4. Gait  x 10 feet with Minimal Assistance using Rolling Walker.     Outcome: Ongoing, Progressing     PT evaluation completed, note to follow. Pt required max A for bed mobility and sat EOB ~3 minutes with SBA with further sitting/stand attempts limited by pt's reports of dizziness. Pt's BP was WNL but her HR was ranging from 130-165 bpm. Recommending pt return to NH with prior therapy services.

## 2023-03-26 NOTE — PLAN OF CARE
Problem: Adult Inpatient Plan of Care  Goal: Plan of Care Review  Outcome: Ongoing, Progressing  Goal: Absence of Hospital-Acquired Illness or Injury  Outcome: Ongoing, Progressing     Problem: Fall Injury Risk  Goal: Absence of Fall and Fall-Related Injury  Outcome: Ongoing, Progressing     Problem: Skin Injury Risk Increased  Goal: Skin Health and Integrity  Outcome: Ongoing, Progressing     Problem: Nausea and Vomiting (Intestinal Obstruction)  Goal: Nausea and Vomiting Relief  Outcome: Ongoing, Progressing     Problem: Pain (Intestinal Obstruction)  Goal: Acceptable Pain Control  Outcome: Ongoing, Progressing

## 2023-03-26 NOTE — PT/OT/SLP EVAL
Physical Therapy Evaluation    Patient Name:  Annette Garcia   MRN:  [id number]    Recommendations:     Discharge Recommendations:  (return to NH with therapy services)   Discharge Equipment Recommendations: none   Barriers to Discharge: None to return to NH    Assessment:     Annette Garcia is a 87 y.o. female admitted with a medical diagnosis of Parastomal hernia with obstruction and without gangrene.  She presents with the following impairments/functional limitations: weakness, impaired endurance, impaired self care skills, impaired functional mobility, gait instability, impaired balance, decreased upper extremity function, decreased lower extremity function, impaired cardiopulmonary response to activity.  Pt required max A for bed mobility and sat EOB ~3 minutes with SBA with further sitting/stand attempts limited by pt's reports of dizziness. Pt's BP was WNL but her HR was ranging from 130-165 bpm. Recommending pt return to NH with prior therapy services.    Rehab Prognosis: Fair; patient would benefit from acute skilled PT services to address these deficits and reach maximum level of function.    Recent Surgery: Procedure(s) (LRB):  ROBOTIC REPAIR, HERNIA, VENTRAL (N/A) 3 Days Post-Op    Plan:     During this hospitalization, patient to be seen 3 x/week to address the identified rehab impairments via gait training, therapeutic activities, therapeutic exercises, neuromuscular re-education and progress toward the following goals:    Plan of Care Expires:  04/26/23    Subjective     Chief Complaint: abdominal pain at rest then reporting dizziness while sitting EOB  Patient/Family Comments/goals: pt pleasant and agreeable to participate in therapy session  Pain/Comfort:  Pain Rating 1:  (4-5/10)  Location 1: abdomen  Pain Addressed 1: Reposition, Distraction  Pain Rating Post-Intervention 1:  (not rated)    Patients cultural, spiritual, Alevism conflicts given the current situation: no    Living  Environment:  Pt is a resident at [facility]  Prior to admission, patients level of function was requiring staff assist for bed mobility and transfers, only ambulating with RW when seen with PT. Pt completes transfers to [facility] with nursing staff and requires assist for sponge baths.  Equipment used at home: hospital bed, walker, rolling, wheelchair.  DME owned (not currently used): none.  Upon discharge, patient will have assistance from NH staff.    Objective:     Communicated with nurse Desouza prior to session. Patient found HOB elevated with bed alarm, telemetry, oxygen  upon PT entry to room.    General Precautions: Standard, fall  Orthopedic Precautions:N/A   Braces: N/A    Exams:  Postural Exam:  Patient presented with the following abnormalities:    -       Rounded shoulders  -       Forward head  Sensation:    -       Intact  Skin Integrity/Edema:      -       Skin integrity: Visible skin intact  RLE ROM: WFL  RLE Strength: grossly 4/5 to 4+/5  LLE ROM: WFL  LLE Strength: grossly 4/5 to 4+/5    Functional Mobility:  Bed Mobility:     Rolling Left:  maximal assistance  Rolling Right: maximal assistance  Scooting: dependence, of 2 persons, and draw sheet towards HOB  Supine to Sit: maximal assistance   Sit to Supine: maximal assistance and of 2 persons      AM-PAC 6 CLICK MOBILITY  Total Score:8       Treatment & Education:  Educated pt on role of PT and pt agreeable to participate in therapy session.  Educated on log roll method to transition to sit EOB. Max A to scoot forward.  Sat EOB with SBA ~3 minutes and further sitting limited by pt's reports of dizziness. Vitals assessed, BP WNLs and HR ranging from 130-165 bpm.  Pt unable to tolerate stand or scooting along EOB, returned to supine - scooted up in bed and completed rolling R/L to adjust sheets.  Pt no longer c/o dizziness in supine.  Educated on UE/LE exercises to completed throughout the days.    Patient left HOB elevated with all lines intact, call  button in reach, bed alarm on, nurse notified, and pt's daughter present.    GOALS:   Multidisciplinary Problems       Physical Therapy Goals          Problem: Physical Therapy    Goal Priority Disciplines Outcome Goal Variances Interventions   Physical Therapy Goal     PT, PT/OT Ongoing, Progressing     Description: Goals to be met by: 23     Patient will increase functional independence with mobility by performin. Supine <> sit with MInimal Assistance  2. Sit to stand transfer with Minimal Assistance  3. Bed to chair transfer with Minimal Assistance using Rolling Walker  4. Gait  x 10 feet with Minimal Assistance using Rolling Walker.                          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    In the setting of septic shock and near death    Petit mal epilepsy     Scoliosis of lumbar spine     Seizures     Unspecified hypothyroidism     UTI (urinary tract infection) 2022       Past Surgical History:   Procedure Laterality Date    APPENDECTOMY      BACK SURGERY      vertebral fracture    BACK SURGERY  2013    lumbar L2-5    CATARACT EXTRACTION, BILATERAL Bilateral     CHOLECYSTECTOMY      open    ESOPHAGOGASTRODUODENOSCOPY N/A 2/15/2023    Procedure: EGD (ESOPHAGOGASTRODUODENOSCOPY);  Surgeon: Keith Chavarria MD;  Location: [facility] ENDO;  Service: Endoscopy;  Laterality: N/A;    EYE SURGERY Bilateral     cataract removal with lens implant    HYSTERECTOMY      PERCUTANEOUS TRANSLUMINAL ANGIOPLASTY (PTA) OF PERIPHERAL VESSEL N/A 2/3/2020    Procedure: PTA, PERIPHERAL VESSEL;  Surgeon: Timmy Simmons MD;  Location: [facility] CATH LAB/EP;  Service: Cardiology;  Laterality:  N/A;    PORTACATH PLACEMENT Right 09/2016    RENAL ARTERY STENT Left 07/19/2017    ROBOT-ASSISTED LAPAROSCOPIC REPAIR OF VENTRAL HERNIA N/A 3/23/2023    Procedure: ROBOTIC REPAIR, HERNIA, VENTRAL;  Surgeon: Chris Johnson MD;  Location: [facility] OR;  Service: General;  Laterality: N/A;  Robotic Parastomal hernia repair    SIGMOIDECTOMY  10/29/2019    SMALL INTESTINE SURGERY  08/23/2016    THROMBECTOMY N/A 2/3/2020    Procedure: THROMBECTOMY;  Surgeon: Timmy Simmons MD;  Location: [facility] CATH LAB/EP;  Service: Cardiology;  Laterality: N/A;    TONSILLECTOMY      VAGINAL HYSTERECTOMY W/ ANTERIOR AND POSTERIOR VAGINAL REPAIR         Time Tracking:     PT Received On: 03/26/23  PT Start Time: 0943     PT Stop Time: 1003  PT Total Time (min): 20 min     Billable Minutes: Evaluation 10      03/26/2023

## 2023-03-26 NOTE — PT/OT/SLP EVAL
Occupational Therapy   Evaluation    Name: Annette Garcia  MRN: [id number]  Admitting Diagnosis: Parastomal hernia with obstruction and without gangrene  Recent Surgery: Procedure(s) (LRB):  ROBOTIC REPAIR, HERNIA, VENTRAL (N/A) 3 Days Post-Op    Recommendations:     Discharge Recommendations: other (see comments) (return to NH with therapy services)  Discharge Equipment Recommendations:  none  Barriers to discharge:  Other (Comment) (Pt requires increased level of assistance)    Assessment:     Annette Garcia is a 87 y.o. female with a medical diagnosis of Parastomal hernia with obstruction and without gangrene.  She presents with The primary encounter diagnosis was Parastomal hernia with obstruction and without gangrene. Diagnoses of Abdominal pain, Acute cystitis without hematuria, SBO (small bowel obstruction), and Encounter for nasogastric (NG) tube placement were also pertinent to this visit. Performance deficits affecting function: weakness, impaired functional mobility, gait instability, impaired endurance, impaired balance, impaired cardiopulmonary response to activity, decreased safety awareness, pain, impaired skin, impaired self care skills, decreased lower extremity function, decreased upper extremity function, decreased ROM, decreased coordination, impaired coordination.      Pt would benefit from cont OT services in order to maximize functional independence. Recommending return to NH upon d/c. OT jameyal performed this date with PT, pt agreeable to therapy. Pt limited this date by increased dizziness & increased HR seated EOB. BP taken at 124/60, & -165 during session. Will progress as able.     Rehab Prognosis: Good and Fair; patient would benefit from acute skilled OT services to address these deficits and reach maximum level of function.       Plan:     Patient to be seen 3 x/week to address the above listed problems via self-care/home management, therapeutic activities, therapeutic  exercises  Plan of Care Expires: 04/26/23  Plan of Care Reviewed with: patient, daughter    Subjective     Chief Complaint: Pain in abdomen  Patient/Family Comments/goals: Return to [facility]    Occupational Profile:  Pt is a resident at [facility]  Prior to admission, patients level of function was requiring staff assist for bed mobility and transfers, only ambulating with RW when seen with PT. Pt completes [place] [facility] [place] staff and requires assist for sponge baths.  Equipment used at home: hospital bed, walker, rolling, wheelchair.  DME owned (not currently used): none.    Upon discharge, patient will have assistance from NH staff.    Pain/Comfort:  Pain Rating 1: other (see comments) (4-5)  Location 1: abdomen  Pain Addressed 1: Reposition, Distraction  Pain Rating Post-Intervention 1: other (see comments) (not rated)    Patients cultural, spiritual, Bahai conflicts given the current situation: no    Objective:     Communicated with: nsg prior to session.  Patient found HOB elevated with bed alarm, telemetry, oxygen upon OT entry to room.    General Precautions: Standard, fall  Orthopedic Precautions: N/A  Braces: N/A    Occupational Performance:    Bed Mobility:    Patient completed Rolling/Turning to Left with  maximal assistance  Patient completed Scooting/Bridging with dependent and 2 persons  Patient completed Supine to Sit with maximal assistance  Patient completed Sit to Supine with maximal assistance and 2 persons    Functional Mobility/Transfers:  NT 2/2 dizziness & increased HR    Cognitive/Visual Perceptual:  Cognitive/Psychosocial Skills:     -       Follows Commands/attention:Follows multistep  commands  -       Memory: No Deficits noted  -       Mood/Affect/Coping skills/emotional control: Cooperative and Pleasant    Physical Exam:  Sensation:    -       Intact  light/touch BUE  Upper Extremity Range of Motion:     -       Right Upper Extremity: shoulder flex ~80 degrees; pt reports  this is baseline  -       Left Upper Extremity: shoulder flex ~120 degrees  Upper Extremity Strength:    -       Right Upper Extremity: 3/5 prox, 3+/5 dist  -       Left Upper Extremity: 3/5 prox, 3+/5 dist   Strength:    -       Right Upper Extremity: Fair  -       Left Upper Extremity: Fair    AMPAC 6 Click ADL:  AMPAC Total Score: 14    Treatment & Education:  Pt would benefit from cont OT services in order to maximize functional independence.   OT eval performed this date with PT, pt agreeable to therapy.   Pt performing bed mobility as above.  Pt sitting EOB ~3 min with SBA.   Pt limited this date by increased dizziness & increased HR seated EOB. BP taken at 124/60, & -165 during session.   Will progress as able.     Patient left HOB elevated with all lines intact, call button in reach, bed alarm on, and nsg notified & daughter present    GOALS:   Multidisciplinary Problems       Occupational Therapy Goals          Problem: Occupational Therapy    Goal Priority Disciplines Outcome Interventions   Occupational Therapy Goal     OT, PT/OT Ongoing, Progressing    Description: Goals to be met by: 4/26/2023     Patient will increase functional independence with ADLs by performing:    Grooming while seated with Set-up Assistance.  Toileting from bedside commode with Minimal Assistance for hygiene and clothing management.   Supine to sit with Stand-by Assistance.  Step transfer with Minimal Assistance & appropriate AD.  Toilet transfer to bedside commode with Minimal Assistance & appropriate AD.                         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    In the setting of septic shock and near death     Petit mal epilepsy 1954    Scoliosis of lumbar spine     Seizures     Unspecified hypothyroidism     UTI (urinary tract infection) 05/22/2022         Past Surgical History:   Procedure Laterality Date    APPENDECTOMY      BACK SURGERY  1988    vertebral fracture    BACK SURGERY  02/2013    lumbar L2-5    CATARACT EXTRACTION, BILATERAL Bilateral     CHOLECYSTECTOMY      open    ESOPHAGOGASTRODUODENOSCOPY N/A 2/15/2023    Procedure: EGD (ESOPHAGOGASTRODUODENOSCOPY);  Surgeon: Keith Chavarria MD;  Location: [facility] ENDO;  Service: Endoscopy;  Laterality: N/A;    EYE SURGERY Bilateral     cataract removal with lens implant    HYSTERECTOMY      PERCUTANEOUS TRANSLUMINAL ANGIOPLASTY (PTA) OF PERIPHERAL VESSEL N/A 2/3/2020    Procedure: PTA, PERIPHERAL VESSEL;  Surgeon: Timmy Simmons MD;  Location: [facility] CATH LAB/EP;  Service: Cardiology;  Laterality: N/A;    PORTACATH PLACEMENT Right 09/2016    RENAL ARTERY STENT Left 07/19/2017    ROBOT-ASSISTED LAPAROSCOPIC REPAIR OF VENTRAL HERNIA N/A 3/23/2023    Procedure: ROBOTIC REPAIR, HERNIA, VENTRAL;  Surgeon: Chris Johnson MD;  Location: [facility] OR;  Service: General;  Laterality: N/A;  Robotic Parastomal hernia repair    SIGMOIDECTOMY  10/29/2019    SMALL INTESTINE SURGERY  08/23/2016    THROMBECTOMY N/A 2/3/2020    Procedure: THROMBECTOMY;  Surgeon: Timmy Simmons MD;  Location: [facility] CATH LAB/EP;  Service: Cardiology;  Laterality: N/A;    TONSILLECTOMY      VAGINAL HYSTERECTOMY W/ ANTERIOR AND POSTERIOR VAGINAL REPAIR         Time Tracking:     OT Date of Treatment: 03/26/23  OT Start Time: 0943  OT Stop Time: 1003  OT Total Time (min): 20 min with PT    Billable Minutes:Evaluation 10  Therapeutic Activity 10    3/26/2023

## 2023-03-27 LAB
ANION GAP SERPL CALC-SCNC: 8 MMOL/L (ref 8–16)
BASOPHILS # BLD AUTO: 0.01 K/UL (ref 0–0.2)
BASOPHILS NFR BLD: 0.2 % (ref 0–1.9)
BUN SERPL-MCNC: 19 MG/DL (ref 8–23)
CALCIUM SERPL-MCNC: 8.1 MG/DL (ref 8.7–10.5)
CHLORIDE SERPL-SCNC: 96 MMOL/L (ref 95–110)
CO2 SERPL-SCNC: 29 MMOL/L (ref 23–29)
CREAT SERPL-MCNC: 0.6 MG/DL (ref 0.5–1.4)
DIFFERENTIAL METHOD: ABNORMAL
EOSINOPHIL # BLD AUTO: 0.3 K/UL (ref 0–0.5)
EOSINOPHIL NFR BLD: 5.3 % (ref 0–8)
ERYTHROCYTE [DISTWIDTH] IN BLOOD BY AUTOMATED COUNT: 13.9 % (ref 11.5–14.5)
EST. GFR  (NO RACE VARIABLE): >60 ML/MIN/1.73 M^2
GLUCOSE SERPL-MCNC: 98 MG/DL (ref 70–110)
HCT VFR BLD AUTO: 23.6 % (ref 37–48.5)
HGB BLD-MCNC: 7.8 G/DL (ref 12–16)
IMM GRANULOCYTES # BLD AUTO: 0.03 K/UL (ref 0–0.04)
IMM GRANULOCYTES NFR BLD AUTO: 0.5 % (ref 0–0.5)
LYMPHOCYTES # BLD AUTO: 1.4 K/UL (ref 1–4.8)
LYMPHOCYTES NFR BLD: 25.5 % (ref 18–48)
MAGNESIUM SERPL-MCNC: 1.4 MG/DL (ref 1.6–2.6)
MCH RBC QN AUTO: 32.5 PG (ref 27–31)
MCHC RBC AUTO-ENTMCNC: 33.1 G/DL (ref 32–36)
MCV RBC AUTO: 98 FL (ref 82–98)
MONOCYTES # BLD AUTO: 0.5 K/UL (ref 0.3–1)
MONOCYTES NFR BLD: 8.5 % (ref 4–15)
NEUTROPHILS # BLD AUTO: 3.4 K/UL (ref 1.8–7.7)
NEUTROPHILS NFR BLD: 60 % (ref 38–73)
NRBC BLD-RTO: 0 /100 WBC
PHOSPHATE SERPL-MCNC: 2.1 MG/DL (ref 2.7–4.5)
PLATELET # BLD AUTO: 137 K/UL (ref 150–450)
PMV BLD AUTO: 9.9 FL (ref 9.2–12.9)
POTASSIUM SERPL-SCNC: 3.4 MMOL/L (ref 3.5–5.1)
RBC # BLD AUTO: 2.4 M/UL (ref 4–5.4)
SODIUM SERPL-SCNC: 133 MMOL/L (ref 136–145)
WBC # BLD AUTO: 5.64 K/UL (ref 3.9–12.7)

## 2023-03-27 PROCEDURE — 27000221 HC OXYGEN, UP TO 24 HOURS

## 2023-03-27 PROCEDURE — 25000003 PHARM REV CODE 250: Performed by: STUDENT IN AN ORGANIZED HEALTH CARE EDUCATION/TRAINING PROGRAM

## 2023-03-27 PROCEDURE — 11000001 HC ACUTE MED/SURG PRIVATE ROOM

## 2023-03-27 PROCEDURE — 97530 THERAPEUTIC ACTIVITIES: CPT | Mod: CO

## 2023-03-27 PROCEDURE — 84100 ASSAY OF PHOSPHORUS: CPT

## 2023-03-27 PROCEDURE — 80048 BASIC METABOLIC PNL TOTAL CA: CPT

## 2023-03-27 PROCEDURE — 36415 COLL VENOUS BLD VENIPUNCTURE: CPT

## 2023-03-27 PROCEDURE — 63600175 PHARM REV CODE 636 W HCPCS

## 2023-03-27 PROCEDURE — 94761 N-INVAS EAR/PLS OXIMETRY MLT: CPT

## 2023-03-27 PROCEDURE — 83735 ASSAY OF MAGNESIUM: CPT

## 2023-03-27 PROCEDURE — 99231 PR SUBSEQUENT HOSPITAL CARE,LEVL I: ICD-10-PCS | Mod: ,,, | Performed by: SURGERY

## 2023-03-27 PROCEDURE — 97530 THERAPEUTIC ACTIVITIES: CPT

## 2023-03-27 PROCEDURE — 85025 COMPLETE CBC W/AUTO DIFF WBC: CPT

## 2023-03-27 PROCEDURE — 25000003 PHARM REV CODE 250: Performed by: SURGERY

## 2023-03-27 PROCEDURE — 99231 SBSQ HOSP IP/OBS SF/LOW 25: CPT | Mod: ,,, | Performed by: SURGERY

## 2023-03-27 PROCEDURE — 25000003 PHARM REV CODE 250

## 2023-03-27 PROCEDURE — 99900035 HC TECH TIME PER 15 MIN (STAT)

## 2023-03-27 PROCEDURE — 94799 UNLISTED PULMONARY SVC/PX: CPT

## 2023-03-27 RX ADMIN — LOSARTAN POTASSIUM 25 MG: 25 TABLET, FILM COATED ORAL at 08:03

## 2023-03-27 RX ADMIN — POTASSIUM PHOSPHATE, MONOBASIC 500 MG: 500 TABLET, SOLUBLE ORAL at 08:03

## 2023-03-27 RX ADMIN — APIXABAN 2.5 MG: 2.5 TABLET, FILM COATED ORAL at 08:03

## 2023-03-27 RX ADMIN — METOPROLOL TARTRATE 25 MG: 25 TABLET, FILM COATED ORAL at 08:03

## 2023-03-27 RX ADMIN — LEVOTHYROXINE SODIUM 125 MCG: 75 TABLET ORAL at 05:03

## 2023-03-27 RX ADMIN — OXYCODONE HYDROCHLORIDE 5 MG: 5 TABLET ORAL at 11:03

## 2023-03-27 RX ADMIN — PHENOBARBITAL 97.2 MG: 32.4 TABLET ORAL at 08:03

## 2023-03-27 RX ADMIN — OXCARBAZEPINE 150 MG: 150 TABLET, FILM COATED ORAL at 08:03

## 2023-03-27 RX ADMIN — FUROSEMIDE 20 MG: 20 TABLET ORAL at 08:03

## 2023-03-27 RX ADMIN — SODIUM CHLORIDE, SODIUM LACTATE, POTASSIUM CHLORIDE, AND CALCIUM CHLORIDE: .6; .31; .03; .02 INJECTION, SOLUTION INTRAVENOUS at 05:03

## 2023-03-27 RX ADMIN — PANTOPRAZOLE SODIUM 40 MG: 40 TABLET, DELAYED RELEASE ORAL at 08:03

## 2023-03-27 RX ADMIN — Medication 400 MG: at 08:03

## 2023-03-27 NOTE — PLAN OF CARE
Problem: Physical Therapy  Goal: Physical Therapy Goal  Description: Goals to be met by: 23     Patient will increase functional independence with mobility by performin. Supine <> sit with MInimal Assistance  2. Sit to stand transfer with Minimal Assistance  3. Bed to chair transfer with Minimal Assistance using Rolling Walker  4. Gait  x 10 feet with Minimal Assistance using Rolling Walker.     Outcome: Ongoing, Progressing     Pt with abdominal pain during EOB sitting this date which limited her tolerance to further sitting or stand attempts. Pt required max A x 2 for bed mobility and for squat scoot along EOB prior to return to supine per pt request. Recommending pt return to NH with therapy services upon d/c.

## 2023-03-27 NOTE — PT/OT/SLP PROGRESS
"Physical Therapy Treatment    Patient Name:  Annette Garcia   MRN:  [id number]    Recommendations:     Discharge Recommendations:  (return to NH with therapy services)  Discharge Equipment Recommendations: none  Barriers to Discharge: None to return to NH    Assessment:     Annette Garcia is a 87 y.o. female admitted with a medical diagnosis of Parastomal hernia with obstruction and without gangrene.  She presents with the following impairments/functional limitations: weakness, impaired endurance, impaired self care skills, impaired functional mobility, gait instability, impaired balance, decreased coordination, decreased upper extremity function, decreased lower extremity function, decreased ROM, pain, impaired coordination. Pt with abdominal pain during EOB sitting this date which limited her tolerance to further sitting or stand attempts. Pt required max A x 2 for bed mobility and for squat scoot along EOB prior to return to supine per pt request. Recommending pt return to NH with therapy services upon d/c.    Rehab Prognosis: Good; patient would benefit from acute skilled PT services to address these deficits and reach maximum level of function.    Recent Surgery: Procedure(s) (LRB):  ROBOTIC REPAIR, HERNIA, VENTRAL (N/A) 4 Days Post-Op    Plan:     During this hospitalization, patient to be seen 3 x/week to address the identified rehab impairments via gait training, therapeutic activities, therapeutic exercises, neuromuscular re-education and progress toward the following goals:    Plan of Care Expires:  04/26/23    Subjective     Chief Complaint: abdominal pain  Patient/Family Comments/goals: "Can I be done with therapy"  Pain/Comfort:  Pain Rating 1: 6/10  Location 1: abdomen  Pain Rating Post-Intervention 1: 9/10 (while sitting EOB - relieved upon return to supine)      Objective:     Communicated with nurse prior to session. Patient found HOB elevated with bed alarm, telemetry, oxygen, SCD upon PT entry " to room.     General Precautions: Standard, fall  Orthopedic Precautions: N/A  Braces: N/A     Functional Mobility:  Bed Mobility:     Scooting: dependence, of 2 persons, and draw sheet to scoot up in bed, max A to scoot forward while sitting EOB, max A x 2 to squat scoot left while sitting EOB  Supine to Sit: maximal assistance and of 2 persons  Sit to Supine: maximal assistance and of 2 persons      AM-PAC 6 CLICK MOBILITY  Turning over in bed (including adjusting bedclothes, sheets and blankets)?: 2  Sitting down on and standing up from a chair with arms (e.g., wheelchair, bedside commode, etc.): 1  Moving from lying on back to sitting on the side of the bed?: 2  Moving to and from a bed to a chair (including a wheelchair)?: 1  Need to walk in hospital room?: 1  Climbing 3-5 steps with a railing?: 1  Basic Mobility Total Score: 8       Treatment & Education:  Educated pt on role of PT and pt agreeable to participate in therapy session.  Transitioned to sit EOB and pt with significant abdominal pain. Max A to scoot forward. OT providing support at pt's back for comfort while sitting EOB.  Sat EOB ~5 minutes and continues with significant abdominal pain, requesting to return to bed.  Returned to supine, scooted up in bed.    Patient left HOB elevated with all lines intact, call button in reach, bed alarm on, and nurse notified.    GOALS:   Multidisciplinary Problems       Physical Therapy Goals          Problem: Physical Therapy    Goal Priority Disciplines Outcome Goal Variances Interventions   Physical Therapy Goal     PT, PT/OT Ongoing, Progressing     Description: Goals to be met by: 23     Patient will increase functional independence with mobility by performin. Supine <> sit with MInimal Assistance  2. Sit to stand transfer with Minimal Assistance  3. Bed to chair transfer with Minimal Assistance using Rolling Walker  4. Gait  x 10 feet with Minimal Assistance using Rolling Walker.                           Time Tracking:     PT Received On: 03/27/23  PT Start Time: 1054     PT Stop Time: 1112  PT Total Time (min): 18 min Overlap with OT for portions of session due to complex nature of patient and for safety with mobility to decrease fall risk for patient and caregiver injury requiring two skilled therapists to provide different interventions.    Billable Minutes: Therapeutic Activity 18       PT/PTA: PT     Number of PTA visits since last PT visit: 0     03/27/2023

## 2023-03-27 NOTE — PROGRESS NOTES
[facility]  General Surgery  Progress Note    Subjective:     History of Present Illness:  No notes on file    Post-Op Info:  Procedure(s) (LRB):  ROBOTIC REPAIR, HERNIA, VENTRAL (N/A)   4 Days Post-Op     Interval History: VSS. Afebrile. Nausea resolved. Ostomy with gas and small amount of stool.     Medications:  Continuous Infusions:   lactated ringers 100 mL/hr at 03/27/23 0509     Scheduled Meds:   apixaban  2.5 mg Oral BID    furosemide  20 mg Oral Daily    levothyroxine  125 mcg Oral Before breakfast    losartan  25 mg Oral Daily    magnesium oxide  400 mg Oral Daily    metoprolol tartrate  25 mg Oral BID    OXcarbazepine  150 mg Oral Nightly    pantoprazole  40 mg Oral Daily    PHENobarbitaL  97.2 mg Oral QHS    potassium phosphate (monobasic)  500 mg Oral Daily     PRN Meds:diphenhydrAMINE, hydrALAZINE, metoprolol, ondansetron, oxyCODONE, prochlorperazine     Review of patient's allergies indicates:   Allergen Reactions    Adhesive Itching and Blisters    Penicillins Anaphylaxis    Tramadol Hives    Avelox [moxifloxacin] Rash     Facial and arm itching and redness. Pt states throat closes when given.    Amoxil [amoxicillin]     Aspridrox [aspirin, buffered]     Codeine Other (See Comments)     Throat swelling    Keflex [cephalexin]      Tolerated cefepime and cefazolin    Norvasc [amlodipine]     Red dye Hives    Robitussin [guaifenesin]     Sulfa (sulfonamide antibiotics)     Tylenol [acetaminophen]      Has reaction to Tylenol with red dye and unable to take Extra Strength Tylenol/ CAN ONLY TOLERATE REG STRENGTH TYLENOL    Vicks vaporub [camphor-eucalyptus oil-menthol]      Objective:     Vital Signs (Most Recent):  Temp: 98.6 °F (37 °C) (03/27/23 0847)  Pulse: 87 (03/27/23 0847)  Resp: 18 (03/27/23 0847)  BP: (!) 184/72 (03/27/23 0847)  SpO2: 97 % (03/27/23 0847)   Vital Signs (24h Range):  Temp:  [96.4 °F (35.8 °C)-98.6 °F (37 °C)] 98.6 °F (37 °C)  Pulse:  [66-90] 87  Resp:  [16-20] 18  SpO2:   [95 %-99 %] 97 %  BP: (127-184)/(57-90) 184/72     Weight: 65.8 kg (145 lb 1 oz)  Body mass index is 31.39 kg/m².    Intake/Output - Last 3 Shifts         03/25 0700 03/26 0659 03/26 0700 03/27 0659 03/27 0700 03/28 0659    P.O. 0 390     I.V. (mL/kg)  0 (0)     Other  0     Total Intake(mL/kg) 0 (0) 390 (5.9)     Urine (mL/kg/hr) 300 (0.2) 800 (0.5)     Emesis/NG output  0     Other  0     Stool  0     Blood  0     Total Output 300 800     Net -300 -410            Urine Occurrence 1 x 1 x     Stool Occurrence 0 x 0 x     Emesis Occurrence  0 x             Physical Exam  Constitutional:       General: She is not in acute distress.     Appearance: She is not ill-appearing or toxic-appearing.   Cardiovascular:      Rate and Rhythm: Regular rhythm. Tachycardia present.   Pulmonary:      Effort: Pulmonary effort is normal. No respiratory distress.   Abdominal:      General: There is no distension.      Palpations: Abdomen is soft.      Tenderness: There is abdominal tenderness. There is no guarding.      Hernia: No hernia is present.      Comments: Mild oleg-incisional tenderness  LLQ ostomy pink, patent and viable with gas in the bag    Skin:     Capillary Refill: Capillary refill takes less than 2 seconds.   Neurological:      Mental Status: She is alert. Mental status is at baseline.       Significant Labs:  I have reviewed all pertinent lab results within the past 24 hours.    Significant Diagnostics:  I have reviewed all pertinent imaging results/findings within the past 24 hours.    Assessment/Plan:     * Parastomal hernia with obstruction and without gangrene  s/p robotic assisted parastomal ventral hernia repair on 3/23  Advance to regular diet  WBC normalized.  UA and CXR unremarkable   Out of bed to chair - pt reluctant to ambulate  PT/OT   Encourage IS use - poor effort   Daily labs  Replace lytes as necessary    Chronic anticoagulation  Apixaban restarted     Atrial fibrillation  PO metoprolol 25mg BID    PRN IV for HR > 120      Seizure disorder  PO phenobarb started yesterday  Tolerated meds this AM         Jose Salcido MD  General Surgery  [facility] - Telemetry      Pt seen and examined.  Agree with note and plan.  Feeling better, +ostomy output  Ok for regular diet  ?Transfer back to facility tomorrow    Juan Mcarthur M.D., F.A.C.S.  [facility] and General Surgery  [facility]

## 2023-03-27 NOTE — PT/OT/SLP PROGRESS
Occupational Therapy   Treatment    Name: Annette Garcia  MRN: [id number]  Admitting Diagnosis:  Parastomal hernia with obstruction and without gangrene  4 Days Post-Op    Recommendations:     Discharge Recommendations: other (see comments) (return to NH with therapy)  Discharge Equipment Recommendations:  none  Barriers to discharge:  Other (Comment) (increased level of assistance)    Assessment:     Annette Garcia is a 87 y.o. female with a medical diagnosis of Parastomal hernia with obstruction and without gangrene.  Performance deficits affecting function are weakness, impaired endurance, impaired self care skills, impaired functional mobility, gait instability, impaired balance, decreased upper extremity function, decreased lower extremity function, pain, decreased ROM, impaired skin. Pt found in bed, agreeable to therapy.  Pt with poor tolerance of therapy 2/2 low back and abdominal pain.  RN notified. Continue OT services to address functional goals, progressing as able.      Rehab Prognosis:  Good; patient would benefit from acute skilled OT services to address these deficits and reach maximum level of function.       Plan:     Patient to be seen 3 x/week to address the above listed problems via self-care/home management, therapeutic activities, therapeutic exercises  Plan of Care Expires: 04/26/23  Plan of Care Reviewed with: patient    Subjective     Chief Complaint: low back and abdominal pain  Patient/Family Comments/goals: pain mgmt  Pain/Comfort:  Pain Rating 1: 6/10 (abdominal at ostomy site and low back)  Pain Addressed 1: Cessation of Activity, Nurse notified  Pain Rating Post-Intervention 1: 9/10    Objective:     Communicated with: RN prior to session.  Patient found HOB elevated with bed alarm, telemetry, peripheral IV upon OT entry to room.    General Precautions: Standard, fall    Orthopedic Precautions:N/A  Braces: [place] J collar     Occupational Performance:     Bed Mobility:    Patient  completed Rolling/Turning to Left with maximal assistance  Patient completed Scooting/Bridging with maximal assistance and 2 persons to scoot seated to EOB  Patient completed Supine to Sit with maximal assistance and 2 persons, HOB elevated, increased time and effort, vc's for effective technique  Patient completed Sit to Supine with maximal assistance and 2 persons     Functional Mobility/Transfers:  Patient attempted Sit <> Stand Transfer however unsuccessful 2/2 pain   Functional Mobility: Unable        AMPAC 6 Click ADL: 14    Treatment & Education:  Pt sat EOB with SBA ~5 min, 2/2 low back and abdominal pain, and requesting to lie back down.  JEOVANNY positioned behind pt with pillow for back support 2/2 pain with minimal relief.   Pt required Max A x 2 to scoot seated to EOB.       Patient left HOB elevated with all lines intact, call button in reach, bed alarm on, and RN notified    GOALS:   Multidisciplinary Problems       Occupational Therapy Goals          Problem: Occupational Therapy    Goal Priority Disciplines Outcome Interventions   Occupational Therapy Goal     OT, PT/OT Ongoing, Progressing    Description: Goals to be met by: 4/26/2023     Patient will increase functional independence with ADLs by performing:    Grooming while seated with Set-up Assistance.  Toileting from bedside commode with Minimal Assistance for hygiene and clothing management.   Supine to sit with Stand-by Assistance.  Step transfer with Minimal Assistance & appropriate AD.  Toilet transfer to bedside commode with Minimal Assistance & appropriate AD.                         Time Tracking:     OT Date of Treatment: 03/27/23  OT Start Time: 1054  OT Stop Time: 1112  OT Total Time (min): 18 min    Billable Minutes:Therapeutic Activity 18  Cotx with PT due to complex nature of patient and for safety with mobility to decrease fall risk for patient and caregiver injury requiring two skilled therapists to provide different  interventions.              3/27/2023

## 2023-03-27 NOTE — PLAN OF CARE
Problem: Adult Inpatient Plan of Care  Goal: Plan of Care Review  Outcome: Ongoing, Progressing   Updated care plan. Chart reviewed.

## 2023-03-27 NOTE — PLAN OF CARE
Problem: Adult Inpatient Plan of Care  Goal: Plan of Care Review  Outcome: Ongoing, Progressing  Goal: Absence of Hospital-Acquired Illness or Injury  Outcome: Ongoing, Progressing     Problem: Fall Injury Risk  Goal: Absence of Fall and Fall-Related Injury  Outcome: Ongoing, Progressing     Problem: Skin Injury Risk Increased  Goal: Skin Health and Integrity  Outcome: Ongoing, Progressing     Problem: Fluid Deficit (Intestinal Obstruction)  Goal: Fluid Balance  Outcome: Ongoing, Progressing     Problem: Infection (Intestinal Obstruction)  Goal: Absence of Infection Signs and Symptoms  Outcome: Ongoing, Progressing     Problem: Nausea and Vomiting (Intestinal Obstruction)  Goal: Nausea and Vomiting Relief  Outcome: Ongoing, Progressing     Problem: Pain (Intestinal Obstruction)  Goal: Acceptable Pain Control  Outcome: Ongoing, Progressing

## 2023-03-27 NOTE — ANESTHESIA POSTPROCEDURE EVALUATION
Anesthesia Post Evaluation    Patient: Annette Garcia    Procedure(s) Performed: Procedure(s) (LRB):  ROBOTIC REPAIR, HERNIA, VENTRAL (N/A)    Final Anesthesia Type: general      Patient location during evaluation: PACU  Patient participation: Yes- Able to Participate  Level of consciousness: awake and alert  Post-procedure vital signs: reviewed and stable  Pain management: adequate  Airway patency: patent    PONV status at discharge: No PONV  Anesthetic complications: no      Cardiovascular status: stable  Respiratory status: spontaneous ventilation  Hydration status: euvolemic  Follow-up not needed.          Vitals Value Taken Time   /57 03/27/23 0420   Temp 35.9 °C (96.7 °F) 03/27/23 0420   Pulse 75 03/27/23 0715   Resp 20 03/27/23 0420   SpO2 97 % 03/27/23 0715         Event Time   Out of Recovery 03/23/2023 15:10:00         Pain/Emerald Score: No data recorded

## 2023-03-27 NOTE — PLAN OF CARE
Problem: Occupational Therapy  Goal: Occupational Therapy Goal  Description: Goals to be met by: 4/26/2023     Patient will increase functional independence with ADLs by performing:    Grooming while seated with Set-up Assistance.  Toileting from bedside commode with Minimal Assistance for hygiene and clothing management.   Supine to sit with Stand-by Assistance.  Step transfer with Minimal Assistance & appropriate AD.  Toilet transfer to bedside commode with Minimal Assistance & appropriate AD.    Outcome: Ongoing, Progressing   Annette Garcia is a 87 y.o. female with a medical diagnosis of Parastomal hernia with obstruction and without gangrene.  Performance deficits affecting function are weakness, impaired endurance, impaired self care skills, impaired functional mobility, gait instability, impaired balance, decreased upper extremity function, decreased lower extremity function, pain, decreased ROM, impaired skin.   Pt found in bed, agreeable to therapy.  Pt with poor tolerance of therapy 2/2 low back and abdominal pain.  RN notified. Continue OT services to address functional goals, progressing as able.

## 2023-03-27 NOTE — SUBJECTIVE & OBJECTIVE
Interval History: VSS. Afebrile. Nausea resolved. Ostomy with gas and small amount of stool.     Medications:  Continuous Infusions:   lactated ringers 100 mL/hr at 03/27/23 0509     Scheduled Meds:   apixaban  2.5 mg Oral BID    furosemide  20 mg Oral Daily    levothyroxine  125 mcg Oral Before breakfast    losartan  25 mg Oral Daily    magnesium oxide  400 mg Oral Daily    metoprolol tartrate  25 mg Oral BID    OXcarbazepine  150 mg Oral Nightly    pantoprazole  40 mg Oral Daily    PHENobarbitaL  97.2 mg Oral QHS    potassium phosphate (monobasic)  500 mg Oral Daily     PRN Meds:diphenhydrAMINE, hydrALAZINE, metoprolol, ondansetron, oxyCODONE, prochlorperazine     Review of patient's allergies indicates:   Allergen Reactions    Adhesive Itching and Blisters    Penicillins Anaphylaxis    Tramadol Hives    Avelox [moxifloxacin] Rash     Facial and arm itching and redness. Pt states throat closes when given.    Amoxil [amoxicillin]     Aspridrox [aspirin, buffered]     Codeine Other (See Comments)     Throat swelling    Keflex [cephalexin]      Tolerated cefepime and cefazolin    Norvasc [amlodipine]     Red dye Hives    Robitussin [guaifenesin]     Sulfa (sulfonamide antibiotics)     Tylenol [acetaminophen]      Has reaction to Tylenol with red dye and unable to take Extra Strength Tylenol/ CAN ONLY TOLERATE REG STRENGTH TYLENOL    Vicks vaporub [camphor-eucalyptus oil-menthol]      Objective:     Vital Signs (Most Recent):  Temp: 98.6 °F (37 °C) (03/27/23 0847)  Pulse: 87 (03/27/23 0847)  Resp: 18 (03/27/23 0847)  BP: (!) 184/72 (03/27/23 0847)  SpO2: 97 % (03/27/23 0847)   Vital Signs (24h Range):  Temp:  [96.4 °F (35.8 °C)-98.6 °F (37 °C)] 98.6 °F (37 °C)  Pulse:  [66-90] 87  Resp:  [16-20] 18  SpO2:  [95 %-99 %] 97 %  BP: (127-184)/(57-90) 184/72     Weight: 65.8 kg (145 lb 1 oz)  Body mass index is 31.39 kg/m².    Intake/Output - Last 3 Shifts         03/25 0700 03/26 0659 03/26 0700 03/27 0659 03/27  0700  03/28 0659    P.O. 0 390     I.V. (mL/kg)  0 (0)     Other  0     Total Intake(mL/kg) 0 (0) 390 (5.9)     Urine (mL/kg/hr) 300 (0.2) 800 (0.5)     Emesis/NG output  0     Other  0     Stool  0     Blood  0     Total Output 300 800     Net -300 -410            Urine Occurrence 1 x 1 x     Stool Occurrence 0 x 0 x     Emesis Occurrence  0 x             Physical Exam  Constitutional:       General: She is not in acute distress.     Appearance: She is not ill-appearing or toxic-appearing.   Cardiovascular:      Rate and Rhythm: Regular rhythm. Tachycardia present.   Pulmonary:      Effort: Pulmonary effort is normal. No respiratory distress.   Abdominal:      General: There is no distension.      Palpations: Abdomen is soft.      Tenderness: There is abdominal tenderness. There is no guarding.      Hernia: No hernia is present.      Comments: Mild oleg-incisional tenderness  LLQ ostomy pink, patent and viable with gas in the bag    Skin:     Capillary Refill: Capillary refill takes less than 2 seconds.   Neurological:      Mental Status: She is alert. Mental status is at baseline.       Significant Labs:  I have reviewed all pertinent lab results within the past 24 hours.    Significant Diagnostics:  I have reviewed all pertinent imaging results/findings within the past 24 hours.

## 2023-03-28 VITALS
HEART RATE: 83 BPM | SYSTOLIC BLOOD PRESSURE: 179 MMHG | WEIGHT: 145.06 LBS | TEMPERATURE: 97 F | DIASTOLIC BLOOD PRESSURE: 84 MMHG | OXYGEN SATURATION: 96 % | BODY MASS INDEX: 31.3 KG/M2 | HEIGHT: 57 IN | RESPIRATION RATE: 18 BRPM

## 2023-03-28 LAB — SARS-COV-2 RDRP RESP QL NAA+PROBE: NEGATIVE

## 2023-03-28 PROCEDURE — 25000003 PHARM REV CODE 250: Performed by: STUDENT IN AN ORGANIZED HEALTH CARE EDUCATION/TRAINING PROGRAM

## 2023-03-28 PROCEDURE — U0002 COVID-19 LAB TEST NON-CDC: HCPCS | Performed by: STUDENT IN AN ORGANIZED HEALTH CARE EDUCATION/TRAINING PROGRAM

## 2023-03-28 PROCEDURE — 94799 UNLISTED PULMONARY SVC/PX: CPT

## 2023-03-28 PROCEDURE — 25000003 PHARM REV CODE 250: Performed by: SURGERY

## 2023-03-28 PROCEDURE — 99900035 HC TECH TIME PER 15 MIN (STAT)

## 2023-03-28 PROCEDURE — 27000221 HC OXYGEN, UP TO 24 HOURS

## 2023-03-28 PROCEDURE — 94761 N-INVAS EAR/PLS OXIMETRY MLT: CPT

## 2023-03-28 PROCEDURE — 25000003 PHARM REV CODE 250

## 2023-03-28 RX ORDER — DOCUSATE SODIUM 100 MG/1
100 CAPSULE, LIQUID FILLED ORAL 2 TIMES DAILY
Status: DISCONTINUED | OUTPATIENT
Start: 2023-03-28 | End: 2023-03-28 | Stop reason: HOSPADM

## 2023-03-28 RX ORDER — DOCUSATE SODIUM 100 MG/1
100 CAPSULE, LIQUID FILLED ORAL 2 TIMES DAILY
Refills: 0 | COMMUNITY
Start: 2023-03-28

## 2023-03-28 RX ORDER — POLYETHYLENE GLYCOL 3350 17 G/17G
17 POWDER, FOR SOLUTION ORAL DAILY
Status: DISCONTINUED | OUTPATIENT
Start: 2023-03-28 | End: 2023-03-28 | Stop reason: HOSPADM

## 2023-03-28 RX ADMIN — DOCUSATE SODIUM 100 MG: 100 CAPSULE, LIQUID FILLED ORAL at 11:03

## 2023-03-28 RX ADMIN — PANTOPRAZOLE SODIUM 40 MG: 40 TABLET, DELAYED RELEASE ORAL at 08:03

## 2023-03-28 RX ADMIN — OXYCODONE HYDROCHLORIDE 5 MG: 5 TABLET ORAL at 05:03

## 2023-03-28 RX ADMIN — METOPROLOL TARTRATE 25 MG: 25 TABLET, FILM COATED ORAL at 08:03

## 2023-03-28 RX ADMIN — POTASSIUM PHOSPHATE, MONOBASIC 500 MG: 500 TABLET, SOLUBLE ORAL at 08:03

## 2023-03-28 RX ADMIN — LOSARTAN POTASSIUM 25 MG: 25 TABLET, FILM COATED ORAL at 08:03

## 2023-03-28 RX ADMIN — POLYETHYLENE GLYCOL 3350 17 G: 17 POWDER, FOR SOLUTION ORAL at 11:03

## 2023-03-28 RX ADMIN — APIXABAN 2.5 MG: 2.5 TABLET, FILM COATED ORAL at 08:03

## 2023-03-28 RX ADMIN — FUROSEMIDE 20 MG: 20 TABLET ORAL at 08:03

## 2023-03-28 RX ADMIN — METOPROLOL TARTRATE 5 MG: 1 INJECTION, SOLUTION INTRAVENOUS at 05:03

## 2023-03-28 RX ADMIN — Medication 400 MG: at 08:03

## 2023-03-28 RX ADMIN — LEVOTHYROXINE SODIUM 125 MCG: 75 TABLET ORAL at 05:03

## 2023-03-28 NOTE — PROGRESS NOTES
[facility]  General Surgery  Progress Note    Subjective:     History of Present Illness:  No notes on file    Post-Op Info:  Procedure(s) (LRB):  ROBOTIC REPAIR, HERNIA, VENTRAL (N/A)   5 Days Post-Op     Interval History: Ms. Garcia is in good spirits this morning with progressive improvement in abdominal pain/soreness. Tolerated regular diet without issue. Given PRN metoprolol x1 this morning for HR > 130. Ostomy with gas, but without significant stool output.     Medications:  Continuous Infusions:  Scheduled Meds:   apixaban  2.5 mg Oral BID    docusate sodium  100 mg Oral BID    furosemide  20 mg Oral Daily    levothyroxine  125 mcg Oral Before breakfast    losartan  25 mg Oral Daily    magnesium oxide  400 mg Oral Daily    metoprolol tartrate  25 mg Oral BID    OXcarbazepine  150 mg Oral Nightly    pantoprazole  40 mg Oral Daily    PHENobarbitaL  97.2 mg Oral QHS    potassium phosphate (monobasic)  500 mg Oral Daily     PRN Meds:diphenhydrAMINE, hydrALAZINE, metoprolol, ondansetron, oxyCODONE, prochlorperazine     Review of patient's allergies indicates:   Allergen Reactions    Adhesive Itching and Blisters    Penicillins Anaphylaxis    Tramadol Hives    Avelox [moxifloxacin] Rash     Facial and arm itching and redness. Pt states throat closes when given.    Amoxil [amoxicillin]     Aspridrox [aspirin, buffered]     Codeine Other (See Comments)     Throat swelling    Keflex [cephalexin]      Tolerated cefepime and cefazolin    Norvasc [amlodipine]     Red dye Hives    Robitussin [guaifenesin]     Sulfa (sulfonamide antibiotics)     Tylenol [acetaminophen]      Has reaction to Tylenol with red dye and unable to take Extra Strength Tylenol/ CAN ONLY TOLERATE REG STRENGTH TYLENOL    Vicks vaporub [camphor-eucalyptus oil-menthol]      Objective:     Vital Signs (Most Recent):  Temp: 96.9 °F (36.1 °C) (03/28/23 0738)  Pulse: 74 (03/28/23 0738)  Resp: 18 (03/28/23 0738)  BP: (!)  150/65 (03/28/23 0738)  SpO2: 100 % (03/28/23 0738)   Vital Signs (24h Range):  Temp:  [96.9 °F (36.1 °C)-98.6 °F (37 °C)] 96.9 °F (36.1 °C)  Pulse:  [70-86] 74  Resp:  [14-20] 18  SpO2:  [97 %-100 %] 100 %  BP: (131-185)/(62-77) 150/65     Weight: 65.8 kg (145 lb 1 oz)  Body mass index is 31.39 kg/m².    Intake/Output - Last 3 Shifts         03/26 0700 03/27 0659 03/27 0700 03/28 0659 03/28 0700 03/29 0659    P.O. 390 915     I.V. (mL/kg) 0 (0) 200 (3)     Other 0 0     Total Intake(mL/kg) 390 (5.9) 1115 (16.9)     Urine (mL/kg/hr) 800 (0.5) 1600 (1)     Emesis/NG output 0 0     Other 0 0     Stool 0 0     Blood 0 0     Total Output 800 1600     Net -410 -485            Urine Occurrence 1 x 0 x     Stool Occurrence 0 x 0 x     Emesis Occurrence 0 x 0 x             Physical Exam  Constitutional:       General: She is not in acute distress.     Appearance: She is not ill-appearing or toxic-appearing.   Cardiovascular:      Rate and Rhythm: Normal rate and regular rhythm.   Pulmonary:      Effort: Pulmonary effort is normal. No respiratory distress.   Abdominal:      General: There is no distension.      Palpations: Abdomen is soft.      Tenderness: There is abdominal tenderness. There is no guarding.      Hernia: No hernia is present.      Comments: Mild oleg-incisional tenderness  LLQ ostomy pink, patent and viable with gas in the bag    Skin:     Capillary Refill: Capillary refill takes less than 2 seconds.   Neurological:      Mental Status: She is alert. Mental status is at baseline.       Significant Labs:  I have reviewed all pertinent lab results within the past 24 hours.    Significant Diagnostics:  I have reviewed all pertinent imaging results/findings within the past 24 hours.    Assessment/Plan:     * Parastomal hernia with obstruction and without gangrene  Annette Garcia is a 87 y.o. female s/p robotic assisted parastomal ventral hernia repair on 3/23/23    -Continue regular diet  -Bowel reg  -Out  of bed to chair - pt reluctant to ambulate  -PT/OT   -Encourage IS use - poor effort   -Replace lytes as necessary    Chronic anticoagulation  Apixaban restarted     Atrial fibrillation  PO metoprolol 25mg BID   PRN IV for HR > 120      Seizure disorder  Home phenobarb       Dispo: Stable for discharge to nursing facility this afternoon pending more substantial ostomy output.     Jose Salcido MD  General Surgery  [place] - Telemetry

## 2023-03-28 NOTE — PLAN OF CARE
"[facility] - Telemetry  Discharge Final Note    Primary Care Provider: Jose Valles MD    Expected Discharge Date:     Pharmacist will go over home medications and reasons for medications. VN and bedside nurse to reiterate final discharge instructions.     Cleared from CM . Bedside Nurse and VN notified.    1220 pm - Orders sent to facility for review. [facility] transport requested for 1500 pm via stretcher.      Final Discharge Note (most recent)       Final Note - 03/28/23 1208          Post-Acute Status    Post-Acute Authorization Placement (P)      Post-Acute Placement Status Pending post-acute provider review/more information requested (P)    Potential DC back to [facility] today. Orders requested from attending team. Facility updated to DC today.                  Future Appointments   Date Time Provider Department Center   4/5/2023  3:00 PM Chris Johnson MD [facility] GENSUR [facility]   4/6/2023  9:00 AM [address] NUCLEAR [facility]   4/6/2023  9:30 AM CARDIOLOGY, STRESS/TILT TABLE/REDD [facility] CARD [facility]   4/6/2023 10:30 AM [address] NUCLEAR [facility]     BP (!) 179/84 (Patient Position: Lying)   Pulse 88   Temp 97.2 °F (36.2 °C) (Axillary)   Resp 18   Ht 4' 9" (1.448 m)   Wt 65.8 kg (145 lb 1 oz)   LMP  (LMP Unknown)   SpO2 96%   BMI 31.39 kg/m²        Medication List        ASK your doctor about these medications      acetaminophen 325 MG tablet  Commonly known as: TYLENOL  Take 2 tablets (650 mg total) by mouth every 4 (four) hours as needed for Pain.     apixaban 2.5 mg Tab  Commonly known as: ELIQUIS  Take 1 tablet (2.5 mg total) by mouth 2 (two) times daily.     calcium-vitamin D 600 mg-10 mcg (400 unit) Tab     furosemide 20 MG tablet  Commonly known as: LASIX     levothyroxine 125 MCG tablet  Commonly known as: SYNTHROID  TAKE 1 TABLET (125 MCG TOTAL) BY MOUTH ONCE DAILY.     losartan 50 MG tablet  Commonly known as: COZAAR  Take 0.5 tablets (25 mg total) by mouth once daily.   "   magnesium oxide 400 mg (241.3 mg magnesium) tablet  Commonly known as: MAG-OX  Take 2 tablets (800 mg total) by mouth once daily.     metoprolol tartrate 25 MG tablet  Commonly known as: LOPRESSOR  Take 1 tablet (25 mg total) by mouth 2 (two) times daily.     OXcarbazepine 150 MG Tab  Commonly known as: TRILEPTAL     pantoprazole 40 MG tablet  Commonly known as: PROTONIX  Take 1 tablet (40 mg total) by mouth once daily.     PHENobarbitaL 97.2 MG tablet  Ask about: Which instructions should I use?     polyethylene glycol 17 gram Pwpk  Commonly known as: GLYCOLAX  Take 17 g by mouth once daily.     potassium chloride SA 20 MEQ tablet  Commonly known as: K-DUR,KLOR-CON     pravastatin 20 MG tablet  Commonly known as: PRAVACHOL     vitamin D 1000 units Tab  Commonly known as: VITAMIN D3

## 2023-03-28 NOTE — NURSING
Shift Event     0530: Notified by monitor [employer] that patient's HR sustaining in the 130's at this time.     0534: PRN IV Lopressor 5 mg administered at this time per order. See MAR.     0548: Patient converted back to NSR. Telemetry strips placed in patient's chart. NADN. Patient resting comfortably in bed. Will continue to monitor.

## 2023-03-28 NOTE — NURSING
Discharge instruction and education packet provided to transport for nursing home. VN and case management notified. IV site removed cath tip intact. Telemetry discontinued without adverse reaction. Patient shows no acute distress. Report called to Cookie at [facility]. Transport picking pt up at :15:33pm.

## 2023-03-28 NOTE — DISCHARGE INSTRUCTIONS
Colostomy Surgery Discharge Instructions    Woundcare:  - Do not pick at surgical glue. This will fall off on its own over the next 1-3 weeks or we will remove it at your post op follow up appointment.    - You may shower, but do not scrub incisions and do not submerge incisions (no baths)    Colostomy Care:  - You have been provided with education materials regarding care and monitoring of your colostomy output. Please review these materials in addition to the instructions below.  - It is recommended that you measure output daily and maintain adequate hydration.  - If any of the following occur, please call the  office:     - Output exceeds 1000 mL in 24 hours     - You are unable to tolerate adequate amounts of liquids to stay hydrated     - Excessive bleeding is noted around the stoma     - The color of the stoma changes to grey or purple     - Bulging noted around the stoma      Activity:  - No heavy lifting ( > 10 lbs) for 6 weeks following surgery  - No swimming in pools, lakes, [place] etc. for 6 weeks after your surgery    Medications  - Some pain and soreness around your incisions is expected and will improve with time.  - In the vast majority cases, over the counter pain medications (Tylenol/Ibupofen) are all that is needed for adequate for pain control.     Diet:  -Resume your pre-operative home diet    Follow up:  -Refer to follow up instructions     Call the *** Office if you experience:  -Increased redness, warmth, tenderness, or draining pus at your incision(s)  -Worsening fevers, chills, nausea/vomiting  -Pain, weakness, coldness, or numbness in your hand  -Uncontrolled pain  -Your call will be returned within 24 hours and further instructions will be provided    Go to ER/Urgent Care if you experience:  -Worsening shortness of breath or chest pain

## 2023-03-28 NOTE — SUBJECTIVE & OBJECTIVE
Interval History: Ms. Garcia is in good spirits this morning with progressive improvement in abdominal pain/soreness. Tolerated regular diet without issue. Given PRN metoprolol x1 this morning for HR > 130. Ostomy with gas, but without significant stool output.     Medications:  Continuous Infusions:  Scheduled Meds:   apixaban  2.5 mg Oral BID    docusate sodium  100 mg Oral BID    furosemide  20 mg Oral Daily    levothyroxine  125 mcg Oral Before breakfast    losartan  25 mg Oral Daily    magnesium oxide  400 mg Oral Daily    metoprolol tartrate  25 mg Oral BID    OXcarbazepine  150 mg Oral Nightly    pantoprazole  40 mg Oral Daily    PHENobarbitaL  97.2 mg Oral QHS    potassium phosphate (monobasic)  500 mg Oral Daily     PRN Meds:diphenhydrAMINE, hydrALAZINE, metoprolol, ondansetron, oxyCODONE, prochlorperazine     Review of patient's allergies indicates:   Allergen Reactions    Adhesive Itching and Blisters    Penicillins Anaphylaxis    Tramadol Hives    Avelox [moxifloxacin] Rash     Facial and arm itching and redness. Pt states throat closes when given.    Amoxil [amoxicillin]     Aspridrox [aspirin, buffered]     Codeine Other (See Comments)     Throat swelling    Keflex [cephalexin]      Tolerated cefepime and cefazolin    Norvasc [amlodipine]     Red dye Hives    Robitussin [guaifenesin]     Sulfa (sulfonamide antibiotics)     Tylenol [acetaminophen]      Has reaction to Tylenol with red dye and unable to take Extra Strength Tylenol/ CAN ONLY TOLERATE REG STRENGTH TYLENOL    Vicks vaporub [camphor-eucalyptus oil-menthol]      Objective:     Vital Signs (Most Recent):  Temp: 96.9 °F (36.1 °C) (03/28/23 0738)  Pulse: 74 (03/28/23 0738)  Resp: 18 (03/28/23 0738)  BP: (!) 150/65 (03/28/23 0738)  SpO2: 100 % (03/28/23 0738)   Vital Signs (24h Range):  Temp:  [96.9 °F (36.1 °C)-98.6 °F (37 °C)] 96.9 °F (36.1 °C)  Pulse:  [70-86] 74  Resp:  [14-20] 18  SpO2:  [97 %-100 %] 100 %  BP: (131-185)/(62-77) 150/65      Weight: 65.8 kg (145 lb 1 oz)  Body mass index is 31.39 kg/m².    Intake/Output - Last 3 Shifts         03/26 0700  03/27 0659 03/27 0700 03/28 0659 03/28 0700  03/29 0659    P.O. 390 915     I.V. (mL/kg) 0 (0) 200 (3)     Other 0 0     Total Intake(mL/kg) 390 (5.9) 1115 (16.9)     Urine (mL/kg/hr) 800 (0.5) 1600 (1)     Emesis/NG output 0 0     Other 0 0     Stool 0 0     Blood 0 0     Total Output 800 1600     Net -410 -485            Urine Occurrence 1 x 0 x     Stool Occurrence 0 x 0 x     Emesis Occurrence 0 x 0 x             Physical Exam  Constitutional:       General: She is not in acute distress.     Appearance: She is not ill-appearing or toxic-appearing.   Cardiovascular:      Rate and Rhythm: Normal rate and regular rhythm.   Pulmonary:      Effort: Pulmonary effort is normal. No respiratory distress.   Abdominal:      General: There is no distension.      Palpations: Abdomen is soft.      Tenderness: There is abdominal tenderness. There is no guarding.      Hernia: No hernia is present.      Comments: Mild oleg-incisional tenderness  LLQ ostomy pink, patent and viable with gas in the bag    Skin:     Capillary Refill: Capillary refill takes less than 2 seconds.   Neurological:      Mental Status: She is alert. Mental status is at baseline.       Significant Labs:  I have reviewed all pertinent lab results within the past 24 hours.    Significant Diagnostics:  I have reviewed all pertinent imaging results/findings within the past 24 hours.

## 2023-03-28 NOTE — HOSPITAL COURSE
Please see the preoperative H&P and other available documentation for full details related to history leading up to this admission.  Briefly, Annette Garcia is a 87 y.o. female who was admitted on 3/22/2023 following scheduled elective surgery for SBO (small bowel obstruction) [K56.609].  Following a complete preoperative discussion of the risks and benefits of surgery with signed informed consent, the patient was taken to the operating room on 3/23/2023 and underwent the above stated procedures.  The patient tolerated surgery well and there were no complications.  Please see the operative report for full intraoperative findings and details.  Postoperatively, the patient did well and was transferred from the PACU to the 4th floor unit in stable condition where they overall had a stable and relatively uncomplicated hospital course.  Labs and vital signs remained essentially stable and appropriate throughout course, and repletion of electrolytes occurred as indicated.  Diet was advanced per usual surgical pathway at appropriate timing, and the patient was transitioned to oral pain medications without problem.  Currently, the patient is doing well at 5 Days Post-Op, and is stable and appropriate for discharge home at this time.

## 2023-03-28 NOTE — ASSESSMENT & PLAN NOTE
Annette Garcia is a 87 y.o. female s/p robotic assisted parastomal ventral hernia repair on 3/23/23    -Continue regular diet  -Out of bed to chair - pt reluctant to ambulate  -PT/OT   -Encourage IS use - poor effort   -Replace lytes as necessary

## 2023-03-28 NOTE — TREATMENT PLAN
[facility]     Department of Hospital Medicine     [address] [place] [address]     [phone] after hours  [phone] fax       NURSING HOME ORDERS    03/28/2023    Admit to Nursing Home: [facility]      Diagnoses:  Active Hospital Problems    Diagnosis  POA    *Parastomal hernia with obstruction and without gangrene [K43.3]  Yes     Priority: 1 - High    Chronic anticoagulation [Z79.01]  Not Applicable    Atrial fibrillation [I48.91]  Yes    Seizure disorder [G40.909]  Yes     Epilepsy type unknown        Resolved Hospital Problems   No resolved problems to display.       Patient is homebound due to:  Parastomal hernia with obstruction and without gangrene    Allergies:  Review of patient's allergies indicates:   Allergen Reactions    Adhesive Itching and Blisters    Penicillins Anaphylaxis    Tramadol Hives    Avelox [moxifloxacin] Rash     Facial and arm itching and redness. Pt states throat closes when given.    Amoxil [amoxicillin]     Aspridrox [aspirin, buffered]     Codeine Other (See Comments)     Throat swelling    Keflex [cephalexin]      Tolerated cefepime and cefazolin    Norvasc [amlodipine]     Red dye Hives    Robitussin [guaifenesin]     Sulfa (sulfonamide antibiotics)     Tylenol [acetaminophen]      Has reaction to Tylenol with red dye and unable to take Extra Strength Tylenol/ CAN ONLY TOLERATE REG STRENGTH TYLENOL    Vicks vaporub [camphor-eucalyptus oil-menthol]        Vitals:  Once weekly      Diet: Regular diet, no fluid restriction    Supplement:  1 can every three times a day with meals                         Type:  Ensure       Acitivities:      - Up in a chair each morning as tolerated   - Ambulate with assistance to bathroom   - Scheduled walks once each shift (every 8 hours)   - May ambulate independently   - May use walker, cane, or self-propelled wheelchair   - Weight bearing: As tolerated     LABS:  Per facility  protocol   CMP, CBC each month for 3 months    Phenobarbital level in 1 month and every 3 months  Nursing Precautions:     - Aspiration precautions:             - Total assistance with meals            -  Upright 90 degrees befor during and after meals             -  Suction at bedside          - Fall precautions per nursing home protocol   - Seizure precaution per snf protocol   - Decubitus precautions:        -  for positioning   - Pressure reducing foam mattress   - Turn ptient every two hours. Use wedge pillows to anchor patient    CONSULTS:      Physical Therapy to evaluate and treat     Occupational Therapy to evaluate and treat     Speech Therapy  to evaluate and treat     Nutrition to evaluate and recommend diet     Psychiatry to evaluate and follow patients for delirium    MISCELLANEOUS CARE:              Colostomy Care:  Empty bag every shift and prn                                             Change and clean site every 48 hours     Routine Skin for Bedridden Patients:  Apply moisture barrier cream to all    skin folds and wet areas in perineal area daily and after baths and                           all bowel movements.    Medications: Discontinue all previous medication orders, if any. See new list below.       Medication List        ASK your doctor about these medications      acetaminophen 325 MG tablet  Commonly known as: TYLENOL  Take 2 tablets (650 mg total) by mouth every 4 (four) hours as needed for Pain.     apixaban 2.5 mg Tab  Commonly known as: ELIQUIS  Take 1 tablet (2.5 mg total) by mouth 2 (two) times daily.     calcium-vitamin D 600 mg-10 mcg (400 unit) Tab  Take 1 tablet by mouth once daily.     furosemide 20 MG tablet  Commonly known as: LASIX  Take 20 mg by mouth once daily.     levothyroxine 125 MCG tablet  Commonly known as: SYNTHROID  TAKE 1 TABLET (125 MCG TOTAL) BY MOUTH ONCE DAILY.     losartan 50 MG tablet  Commonly known as: COZAAR  Take 0.5 tablets (25 mg  total) by mouth once daily.     magnesium oxide 400 mg (241.3 mg magnesium) tablet  Commonly known as: MAG-OX  Take 2 tablets (800 mg total) by mouth once daily.     metoprolol tartrate 25 MG tablet  Commonly known as: LOPRESSOR  Take 1 tablet (25 mg total) by mouth 2 (two) times daily.     OXcarbazepine 150 MG Tab  Commonly known as: TRILEPTAL  Take 150 mg by mouth nightly.     pantoprazole 40 MG tablet  Commonly known as: PROTONIX  Take 1 tablet (40 mg total) by mouth once daily.     PHENobarbitaL 97.2 MG tablet  Take 97.2 mg by mouth every evening.  Ask about: Which instructions should I use?     polyethylene glycol 17 gram Pwpk  Commonly known as: GLYCOLAX  Take 17 g by mouth once daily.     potassium chloride SA 20 MEQ tablet  Commonly known as: K-DUR,KLOR-CON  Take 20 mEq by mouth once daily.     pravastatin 20 MG tablet  Commonly known as: PRAVACHOL  Take 20 mg by mouth once daily.     vitamin D 1000 units Tab  Commonly known as: VITAMIN D3  Take 1,000 Units by mouth once daily.                    _________________________________  Jose Salcido MD  03/28/2023

## 2023-03-28 NOTE — DISCHARGE SUMMARY
[facility]  General Surgery  Discharge Summary      Patient Name: Annette Garcia  MRN: [id number]  Admission Date: 3/22/2023  Hospital Length of Stay: 6 days  Discharge Date and Time:  03/28/2023 1:56 PM  Attending Physician: Chris Johnson MD   Discharging Provider: Jose Salcido MD  Primary Care Provider: Jose Valles MD    HPI:   No notes on file    Procedure(s) (LRB):  ROBOTIC REPAIR, HERNIA, VENTRAL (N/A)      Indwelling Lines/Drains at time of discharge:   Lines/Drains/Airways     Drain  Duration                Colostomy 10/29/19 1200 LLQ 1246 days              Hospital Course: Please see the preoperative H&P and other available documentation for full details related to history leading up to this admission.  Briefly, Annette Garcia is a 87 y.o. female who was admitted on 3/22/2023 following scheduled elective surgery for SBO (small bowel obstruction) [K56.609].  Following a complete preoperative discussion of the risks and benefits of surgery with signed informed consent, the patient was taken to the operating room on 3/23/2023 and underwent the above stated procedures.  The patient tolerated surgery well and there were no complications.  Please see the operative report for full intraoperative findings and details.  Postoperatively, the patient did well and was transferred from the PACU to the 4th floor unit in stable condition where they overall had a stable and relatively uncomplicated hospital course.  Labs and vital signs remained essentially stable and appropriate throughout course, and repletion of electrolytes occurred as indicated.  Diet was advanced per usual surgical pathway at appropriate timing, and the patient was transitioned to oral pain medications without problem.  Currently, the patient is doing well at 5 Days Post-Op, and is stable and appropriate for discharge home at this time.         Goals of Care Treatment Preferences:  Code Status: Full Code    Health care  agent: Beat[facility] agent number: No value filed.    Living Will: Yes              Pending Diagnostic Studies:     None        Final Active Diagnoses:    Diagnosis Date Noted POA    PRINCIPAL PROBLEM:  Parastomal hernia with obstruction and without gangrene [K43.3] 02/15/2023 Yes    Chronic anticoagulation [Z79.01] 08/20/2020 Not Applicable    Atrial fibrillation [I48.91] 12/28/2019 Yes    Seizure disorder [G40.909] 08/18/2016 Yes      Problems Resolved During this Admission:      Discharged Condition: fair    Disposition: Skilled Nursing Facility    Follow Up:   Follow-up Information     Chris Johnson MD Follow up in 2 week(s).    Specialties: Surgery, General Surgery  Contact information:  [address] [facility] [facility]  [address]  [place] [address]  [phone]                       Patient Instructions:      Diet Adult Regular     Lifting restrictions   Order Comments: Do not lift greater than 10 lbs for 6 weeks following surgery.     No dressing needed   Order Comments: Do not pick at surgical glue. This will fall off on its own over the next 1-3 weeks or we will remove it at your post op follow up appointment.     Notify your health care provider if you experience any of the following:  temperature >100.4     Notify your health care provider if you experience any of the following:  persistent nausea and vomiting or diarrhea     Notify your health care provider if you experience any of the following:  severe uncontrolled pain     Notify your health care provider if you experience any of the following:  redness, tenderness, or signs of infection (pain, swelling, redness, odor or green/yellow discharge around incision site)     Notify your health care provider if you experience any of the following:  difficulty breathing or increased cough     Notify your health care provider if you experience any of the following:  severe persistent headache     Notify your health care provider if you experience any of the  following:  worsening rash     Notify your health care provider if you experience any of the following:  persistent dizziness, light-headedness, or visual disturbances     Notify your health care provider if you experience any of the following:  increased confusion or weakness     Activity as tolerated     Shower on day dressing removed (No bath)   Order Comments: You may shower but do not submerge incisions (no baths)     Medications:  Reconciled Home Medications:      Medication List      START taking these medications    docusate sodium 100 MG capsule  Commonly known as: COLACE  Take 1 capsule (100 mg total) by mouth 2 (two) times daily.        CHANGE how you take these medications    losartan 50 MG tablet  Commonly known as: COZAAR  Take 0.5 tablets (25 mg total) by mouth once daily.  What changed: additional instructions        CONTINUE taking these medications    acetaminophen 325 MG tablet  Commonly known as: TYLENOL  Take 2 tablets (650 mg total) by mouth every 4 (four) hours as needed for Pain.     apixaban 2.5 mg Tab  Commonly known as: ELIQUIS  Take 1 tablet (2.5 mg total) by mouth 2 (two) times daily.     calcium-vitamin D 600 mg-10 mcg (400 unit) Tab  Take 1 tablet by mouth once daily.     furosemide 20 MG tablet  Commonly known as: LASIX  Take 20 mg by mouth once daily.     levothyroxine 125 MCG tablet  Commonly known as: SYNTHROID  TAKE 1 TABLET (125 MCG TOTAL) BY MOUTH ONCE DAILY.     magnesium oxide 400 mg (241.3 mg magnesium) tablet  Commonly known as: MAG-OX  Take 2 tablets (800 mg total) by mouth once daily.     metoprolol tartrate 25 MG tablet  Commonly known as: LOPRESSOR  Take 1 tablet (25 mg total) by mouth 2 (two) times daily.     OXcarbazepine 150 MG Tab  Commonly known as: TRILEPTAL  Take 150 mg by mouth nightly.     pantoprazole 40 MG tablet  Commonly known as: PROTONIX  Take 1 tablet (40 mg total) by mouth once daily.     PHENobarbitaL 97.2 MG tablet  Take 97.2 mg by mouth every  evening.     polyethylene glycol 17 gram Pwpk  Commonly known as: GLYCOLAX  Take 17 g by mouth once daily.     potassium chloride SA 20 MEQ tablet  Commonly known as: K-DUR,KLOR-CON  Take 20 mEq by mouth once daily.     pravastatin 20 MG tablet  Commonly known as: PRAVACHOL  Take 20 mg by mouth once daily.     vitamin D 1000 units Tab  Commonly known as: VITAMIN D3  Take 1,000 Units by mouth once daily.          Time spent on the discharge of patient: 10 minutes    Jose Salcido MD  General Surgery  [facility]

## 2023-04-21 ENCOUNTER — OFFICE VISIT (OUTPATIENT)
Dept: SURGERY | Facility: CLINIC | Age: 88
End: 2023-04-21
Payer: MEDICARE

## 2023-04-21 VITALS
WEIGHT: 145.06 LBS | HEART RATE: 86 BPM | BODY MASS INDEX: 31.3 KG/M2 | HEIGHT: 57 IN | SYSTOLIC BLOOD PRESSURE: 111 MMHG | DIASTOLIC BLOOD PRESSURE: 61 MMHG

## 2023-04-21 DIAGNOSIS — Z87.19 S/P HERNIA REPAIR: Primary | ICD-10-CM

## 2023-04-21 DIAGNOSIS — Z98.890 S/P HERNIA REPAIR: Primary | ICD-10-CM

## 2023-04-21 PROCEDURE — 99999 PR PBB SHADOW E&M-EST. PATIENT-LVL II: ICD-10-PCS | Mod: PBBFAC,,, | Performed by: STUDENT IN AN ORGANIZED HEALTH CARE EDUCATION/TRAINING PROGRAM

## 2023-04-21 PROCEDURE — 99212 OFFICE O/P EST SF 10 MIN: CPT | Mod: PBBFAC,PO | Performed by: STUDENT IN AN ORGANIZED HEALTH CARE EDUCATION/TRAINING PROGRAM

## 2023-04-21 PROCEDURE — 99999 PR PBB SHADOW E&M-EST. PATIENT-LVL II: CPT | Mod: PBBFAC,,, | Performed by: STUDENT IN AN ORGANIZED HEALTH CARE EDUCATION/TRAINING PROGRAM

## 2023-04-21 PROCEDURE — 99212 OFFICE O/P EST SF 10 MIN: CPT | Mod: S$PBB,,, | Performed by: STUDENT IN AN ORGANIZED HEALTH CARE EDUCATION/TRAINING PROGRAM

## 2023-04-21 PROCEDURE — 99212 PR OFFICE/OUTPT VISIT, EST, LEVL II, 10-19 MIN: ICD-10-PCS | Mod: S$PBB,,, | Performed by: STUDENT IN AN ORGANIZED HEALTH CARE EDUCATION/TRAINING PROGRAM

## 2023-04-21 NOTE — PROGRESS NOTES
S/p robot parastomal hernia repair  Feeling well  Good ostomy output  No NV  Minimal to no pain now  Working with PT  Incision good, well healed  Ok from my standpoint to do whatever they want  RTC prn

## 2023-04-28 NOTE — ASSESSMENT & PLAN NOTE
Pre-procedure checklist reviewed, AUC complete and pre-sedation note complete.    MD aware of maximum contrast dose of 270 mL. · Per patient, she had 30-40 episodes of vomiting PTA  · Denies constipation or diarrhea  · Abd CT:   · No convincing significant acute bowel obstruction, transmural inflammation or other acute abnormality involving the GI tract in this patient with vomiting.  There are few loops of borderline caliber small bowel in the mid abdomen at this time without a transition point and this finding is nonspecific.  · Postoperative changes of left lower quadrant colostomy with stable parastomal hernia containing loops of small bowel as described.  No concerning obstructive changes/incarceration seen.  · Again noted is an enterocolic anastomosis in the midline lower abdomen and Sohail's pouch.  · Postoperative changes of cholecystectomy, hysterectomy and left iliac vein stenting as described.  · No convincing acute focal visceral lesions.  · Hiatal hernia.  · Thoracolumbar remote compression fractures, osteopenia as well as spondylosis with lumbar stenoses as above.  · Tolerating ice chips w/o further N/V - denies abd pain  2/15- Underwent endoscopy per GI- candidiasis esophagitis, hiatal hernia, erosive gastropathy with clip placed and polyp without resection attempt.   -Initiated on diflucan IV due to pt's red dye allergy, formulation could be switched to suspension upon discharge  -Protonix x 8 weeks and repeat upper endoscopy for evaluation  -General surgery suspects hiatal hernia may be culprit, gastrografin ordered.

## 2023-05-22 PROBLEM — K92.0 HEMATEMESIS: Status: RESOLVED | Noted: 2023-02-15 | Resolved: 2023-05-22

## 2023-06-09 NOTE — PLAN OF CARE
Patient on room air, sats 91%, placed pt back on 0.5lnc. Will attempt to wean per O2 order protocol.    knee pain/injury

## 2023-07-24 NOTE — TELEPHONE ENCOUNTER
I called pt and informed her that she does not need to fast for Dr. Arredondo's lab work tomorrow and that she can take her medication as usual. Also r/s her f/u appt with Dr. Arredondo for 3/14/17. Pt verbalized understanding.    ----- Message from Marlene Romero sent at 3/2/2017  2:16 PM CST -----  Contact: self - Ms Garcia [phone]  Pt has this two questions:  1)  is does she have to fast for her labs tomorrow ( I told her the apt says Non Fasting but she wants to make sure )   2) second is can she take her meds as usual ?      Please call pt [phone] ( her home )      Dutasteride Pregnancy And Lactation Text: This medication is absolutely contraindicated in women, especially during pregnancy and breast feeding. Feminization of male fetuses is possible if taking while pregnant.

## 2023-10-12 ENCOUNTER — PATIENT MESSAGE (OUTPATIENT)
Dept: RESPIRATORY THERAPY | Facility: HOSPITAL | Age: 88
End: 2023-10-12
Payer: MEDICARE

## 2023-12-15 ENCOUNTER — HOSPITAL ENCOUNTER (INPATIENT)
Facility: HOSPITAL | Age: 88
LOS: 4 days | Discharge: NURSING, REHAB OR LONG-TERM CARE | DRG: 389 | End: 2023-12-19
Attending: STUDENT IN AN ORGANIZED HEALTH CARE EDUCATION/TRAINING PROGRAM | Admitting: STUDENT IN AN ORGANIZED HEALTH CARE EDUCATION/TRAINING PROGRAM
Payer: MEDICARE

## 2023-12-15 DIAGNOSIS — I48.91 A-FIB: ICD-10-CM

## 2023-12-15 DIAGNOSIS — K56.609 SMALL BOWEL OBSTRUCTION: Primary | ICD-10-CM

## 2023-12-15 DIAGNOSIS — R00.0 TACHYCARDIA: ICD-10-CM

## 2023-12-15 DIAGNOSIS — K56.609 BOWEL OBSTRUCTION: ICD-10-CM

## 2023-12-15 DIAGNOSIS — K56.609 SBO (SMALL BOWEL OBSTRUCTION): ICD-10-CM

## 2023-12-15 DIAGNOSIS — R11.2 NAUSEA AND VOMITING: ICD-10-CM

## 2023-12-15 PROBLEM — K92.2 GI BLEED: Status: ACTIVE | Noted: 2023-12-15

## 2023-12-15 LAB
ABO + RH BLD: NORMAL
ALBUMIN SERPL BCP-MCNC: 3.7 G/DL (ref 3.5–5.2)
ALBUMIN SERPL BCP-MCNC: 4.1 G/DL (ref 3.5–5.2)
ALP SERPL-CCNC: 107 U/L (ref 55–135)
ALP SERPL-CCNC: 93 U/L (ref 55–135)
ALT SERPL W/O P-5'-P-CCNC: 19 U/L (ref 10–44)
ALT SERPL W/O P-5'-P-CCNC: 21 U/L (ref 10–44)
ANION GAP SERPL CALC-SCNC: 14 MMOL/L (ref 8–16)
ANION GAP SERPL CALC-SCNC: 19 MMOL/L (ref 8–16)
APTT PPP: 29.1 SEC (ref 21–32)
AST SERPL-CCNC: 23 U/L (ref 10–40)
AST SERPL-CCNC: 29 U/L (ref 10–40)
BASOPHILS # BLD AUTO: 0.01 K/UL (ref 0–0.2)
BASOPHILS # BLD AUTO: 0.01 K/UL (ref 0–0.2)
BASOPHILS # BLD AUTO: 0.02 K/UL (ref 0–0.2)
BASOPHILS NFR BLD: 0.1 % (ref 0–1.9)
BASOPHILS NFR BLD: 0.1 % (ref 0–1.9)
BASOPHILS NFR BLD: 0.3 % (ref 0–1.9)
BILIRUB SERPL-MCNC: 0.4 MG/DL (ref 0.1–1)
BILIRUB SERPL-MCNC: 0.4 MG/DL (ref 0.1–1)
BLD GP AB SCN CELLS X3 SERPL QL: NORMAL
BUN SERPL-MCNC: 14 MG/DL (ref 8–23)
BUN SERPL-MCNC: 15 MG/DL (ref 8–23)
CALCIUM SERPL-MCNC: 10.1 MG/DL (ref 8.7–10.5)
CALCIUM SERPL-MCNC: 9.5 MG/DL (ref 8.7–10.5)
CHLORIDE SERPL-SCNC: 93 MMOL/L (ref 95–110)
CHLORIDE SERPL-SCNC: 96 MMOL/L (ref 95–110)
CO2 SERPL-SCNC: 26 MMOL/L (ref 23–29)
CO2 SERPL-SCNC: 26 MMOL/L (ref 23–29)
CREAT SERPL-MCNC: 0.8 MG/DL (ref 0.5–1.4)
CREAT SERPL-MCNC: 0.8 MG/DL (ref 0.5–1.4)
DIFFERENTIAL METHOD: ABNORMAL
EOSINOPHIL # BLD AUTO: 0 K/UL (ref 0–0.5)
EOSINOPHIL NFR BLD: 0.1 % (ref 0–8)
EOSINOPHIL NFR BLD: 0.1 % (ref 0–8)
EOSINOPHIL NFR BLD: 0.3 % (ref 0–8)
ERYTHROCYTE [DISTWIDTH] IN BLOOD BY AUTOMATED COUNT: 13.9 % (ref 11.5–14.5)
ERYTHROCYTE [DISTWIDTH] IN BLOOD BY AUTOMATED COUNT: 14 % (ref 11.5–14.5)
ERYTHROCYTE [DISTWIDTH] IN BLOOD BY AUTOMATED COUNT: 14 % (ref 11.5–14.5)
EST. GFR  (NO RACE VARIABLE): >60 ML/MIN/1.73 M^2
EST. GFR  (NO RACE VARIABLE): >60 ML/MIN/1.73 M^2
GLUCOSE SERPL-MCNC: 116 MG/DL (ref 70–110)
GLUCOSE SERPL-MCNC: 145 MG/DL (ref 70–110)
HCT VFR BLD AUTO: 36.1 % (ref 37–48.5)
HCT VFR BLD AUTO: 38.1 % (ref 37–48.5)
HCT VFR BLD AUTO: 39.2 % (ref 37–48.5)
HGB BLD-MCNC: 12.5 G/DL (ref 12–16)
HGB BLD-MCNC: 12.9 G/DL (ref 12–16)
HGB BLD-MCNC: 13.3 G/DL (ref 12–16)
IMM GRANULOCYTES # BLD AUTO: 0.01 K/UL (ref 0–0.04)
IMM GRANULOCYTES NFR BLD AUTO: 0.1 % (ref 0–0.5)
INR PPP: 1 (ref 0.8–1.2)
LIPASE SERPL-CCNC: 8 U/L (ref 4–60)
LYMPHOCYTES # BLD AUTO: 1.1 K/UL (ref 1–4.8)
LYMPHOCYTES # BLD AUTO: 1.4 K/UL (ref 1–4.8)
LYMPHOCYTES # BLD AUTO: 1.4 K/UL (ref 1–4.8)
LYMPHOCYTES NFR BLD: 16.3 % (ref 18–48)
LYMPHOCYTES NFR BLD: 17 % (ref 18–48)
LYMPHOCYTES NFR BLD: 19.2 % (ref 18–48)
MAGNESIUM SERPL-MCNC: 1.8 MG/DL (ref 1.6–2.6)
MCH RBC QN AUTO: 32.3 PG (ref 27–31)
MCH RBC QN AUTO: 32.4 PG (ref 27–31)
MCH RBC QN AUTO: 32.8 PG (ref 27–31)
MCHC RBC AUTO-ENTMCNC: 33.9 G/DL (ref 32–36)
MCHC RBC AUTO-ENTMCNC: 33.9 G/DL (ref 32–36)
MCHC RBC AUTO-ENTMCNC: 34.6 G/DL (ref 32–36)
MCV RBC AUTO: 95 FL (ref 82–98)
MCV RBC AUTO: 95 FL (ref 82–98)
MCV RBC AUTO: 96 FL (ref 82–98)
MONOCYTES # BLD AUTO: 0.5 K/UL (ref 0.3–1)
MONOCYTES # BLD AUTO: 0.6 K/UL (ref 0.3–1)
MONOCYTES # BLD AUTO: 0.9 K/UL (ref 0.3–1)
MONOCYTES NFR BLD: 11.8 % (ref 4–15)
MONOCYTES NFR BLD: 6.2 % (ref 4–15)
MONOCYTES NFR BLD: 8.1 % (ref 4–15)
NEUTROPHILS # BLD AUTO: 5.1 K/UL (ref 1.8–7.7)
NEUTROPHILS # BLD AUTO: 5.2 K/UL (ref 1.8–7.7)
NEUTROPHILS # BLD AUTO: 6.2 K/UL (ref 1.8–7.7)
NEUTROPHILS NFR BLD: 68.5 % (ref 38–73)
NEUTROPHILS NFR BLD: 75.1 % (ref 38–73)
NEUTROPHILS NFR BLD: 76.5 % (ref 38–73)
NRBC BLD-RTO: 0 /100 WBC
PLATELET # BLD AUTO: 154 K/UL (ref 150–450)
PLATELET # BLD AUTO: 174 K/UL (ref 150–450)
PLATELET # BLD AUTO: 177 K/UL (ref 150–450)
PMV BLD AUTO: 10.1 FL (ref 9.2–12.9)
PMV BLD AUTO: 10.8 FL (ref 9.2–12.9)
PMV BLD AUTO: 9.9 FL (ref 9.2–12.9)
POTASSIUM SERPL-SCNC: 4.1 MMOL/L (ref 3.5–5.1)
POTASSIUM SERPL-SCNC: 4.2 MMOL/L (ref 3.5–5.1)
PROT SERPL-MCNC: 7.3 G/DL (ref 6–8.4)
PROT SERPL-MCNC: 7.9 G/DL (ref 6–8.4)
PROTHROMBIN TIME: 10.9 SEC (ref 9–12.5)
RBC # BLD AUTO: 3.81 M/UL (ref 4–5.4)
RBC # BLD AUTO: 3.99 M/UL (ref 4–5.4)
RBC # BLD AUTO: 4.11 M/UL (ref 4–5.4)
SODIUM SERPL-SCNC: 136 MMOL/L (ref 136–145)
SODIUM SERPL-SCNC: 138 MMOL/L (ref 136–145)
SPECIMEN OUTDATE: NORMAL
WBC # BLD AUTO: 6.94 K/UL (ref 3.9–12.7)
WBC # BLD AUTO: 7.46 K/UL (ref 3.9–12.7)
WBC # BLD AUTO: 8.05 K/UL (ref 3.9–12.7)

## 2023-12-15 PROCEDURE — 25500020 PHARM REV CODE 255: Performed by: STUDENT IN AN ORGANIZED HEALTH CARE EDUCATION/TRAINING PROGRAM

## 2023-12-15 PROCEDURE — 63600175 PHARM REV CODE 636 W HCPCS: Performed by: STUDENT IN AN ORGANIZED HEALTH CARE EDUCATION/TRAINING PROGRAM

## 2023-12-15 PROCEDURE — 93005 ELECTROCARDIOGRAM TRACING: CPT

## 2023-12-15 PROCEDURE — 99222 1ST HOSP IP/OBS MODERATE 55: CPT | Mod: ,,, | Performed by: INTERNAL MEDICINE

## 2023-12-15 PROCEDURE — 96375 TX/PRO/DX INJ NEW DRUG ADDON: CPT

## 2023-12-15 PROCEDURE — C9113 INJ PANTOPRAZOLE SODIUM, VIA: HCPCS | Performed by: STUDENT IN AN ORGANIZED HEALTH CARE EDUCATION/TRAINING PROGRAM

## 2023-12-15 PROCEDURE — 80053 COMPREHEN METABOLIC PANEL: CPT | Performed by: STUDENT IN AN ORGANIZED HEALTH CARE EDUCATION/TRAINING PROGRAM

## 2023-12-15 PROCEDURE — 83735 ASSAY OF MAGNESIUM: CPT | Performed by: STUDENT IN AN ORGANIZED HEALTH CARE EDUCATION/TRAINING PROGRAM

## 2023-12-15 PROCEDURE — 99222 PR INITIAL HOSPITAL CARE,LEVL II: ICD-10-PCS | Mod: ,,, | Performed by: INTERNAL MEDICINE

## 2023-12-15 PROCEDURE — 93010 EKG 12-LEAD: ICD-10-PCS | Mod: ,,, | Performed by: INTERNAL MEDICINE

## 2023-12-15 PROCEDURE — 93010 ELECTROCARDIOGRAM REPORT: CPT | Mod: ,,, | Performed by: INTERNAL MEDICINE

## 2023-12-15 PROCEDURE — 27000221 HC OXYGEN, UP TO 24 HOURS

## 2023-12-15 PROCEDURE — 85610 PROTHROMBIN TIME: CPT | Performed by: STUDENT IN AN ORGANIZED HEALTH CARE EDUCATION/TRAINING PROGRAM

## 2023-12-15 PROCEDURE — 80053 COMPREHEN METABOLIC PANEL: CPT | Mod: 91 | Performed by: STUDENT IN AN ORGANIZED HEALTH CARE EDUCATION/TRAINING PROGRAM

## 2023-12-15 PROCEDURE — 85730 THROMBOPLASTIN TIME PARTIAL: CPT | Performed by: STUDENT IN AN ORGANIZED HEALTH CARE EDUCATION/TRAINING PROGRAM

## 2023-12-15 PROCEDURE — 86850 RBC ANTIBODY SCREEN: CPT | Performed by: STUDENT IN AN ORGANIZED HEALTH CARE EDUCATION/TRAINING PROGRAM

## 2023-12-15 PROCEDURE — 11000001 HC ACUTE MED/SURG PRIVATE ROOM

## 2023-12-15 PROCEDURE — 99900035 HC TECH TIME PER 15 MIN (STAT)

## 2023-12-15 PROCEDURE — 36415 COLL VENOUS BLD VENIPUNCTURE: CPT | Performed by: STUDENT IN AN ORGANIZED HEALTH CARE EDUCATION/TRAINING PROGRAM

## 2023-12-15 PROCEDURE — 85025 COMPLETE CBC W/AUTO DIFF WBC: CPT | Performed by: STUDENT IN AN ORGANIZED HEALTH CARE EDUCATION/TRAINING PROGRAM

## 2023-12-15 PROCEDURE — 96374 THER/PROPH/DIAG INJ IV PUSH: CPT

## 2023-12-15 PROCEDURE — 85025 COMPLETE CBC W/AUTO DIFF WBC: CPT | Mod: 91 | Performed by: STUDENT IN AN ORGANIZED HEALTH CARE EDUCATION/TRAINING PROGRAM

## 2023-12-15 PROCEDURE — 99285 EMERGENCY DEPT VISIT HI MDM: CPT | Mod: 25

## 2023-12-15 PROCEDURE — 83690 ASSAY OF LIPASE: CPT | Performed by: STUDENT IN AN ORGANIZED HEALTH CARE EDUCATION/TRAINING PROGRAM

## 2023-12-15 PROCEDURE — 94761 N-INVAS EAR/PLS OXIMETRY MLT: CPT

## 2023-12-15 PROCEDURE — 96361 HYDRATE IV INFUSION ADD-ON: CPT

## 2023-12-15 PROCEDURE — 99222 PR INITIAL HOSPITAL CARE,LEVL II: ICD-10-PCS | Mod: ,,, | Performed by: STUDENT IN AN ORGANIZED HEALTH CARE EDUCATION/TRAINING PROGRAM

## 2023-12-15 PROCEDURE — 99222 1ST HOSP IP/OBS MODERATE 55: CPT | Mod: ,,, | Performed by: STUDENT IN AN ORGANIZED HEALTH CARE EDUCATION/TRAINING PROGRAM

## 2023-12-15 RX ORDER — PHENOBARBITAL SODIUM 65 MG/ML
97.2 INJECTION, SOLUTION INTRAMUSCULAR; INTRAVENOUS NIGHTLY
Status: DISCONTINUED | OUTPATIENT
Start: 2023-12-15 | End: 2023-12-18

## 2023-12-15 RX ORDER — LABETALOL HYDROCHLORIDE 5 MG/ML
10 INJECTION, SOLUTION INTRAVENOUS ONCE
Status: COMPLETED | OUTPATIENT
Start: 2023-12-15 | End: 2023-12-15

## 2023-12-15 RX ORDER — SODIUM CHLORIDE, SODIUM LACTATE, POTASSIUM CHLORIDE, CALCIUM CHLORIDE 600; 310; 30; 20 MG/100ML; MG/100ML; MG/100ML; MG/100ML
INJECTION, SOLUTION INTRAVENOUS CONTINUOUS
Status: DISCONTINUED | OUTPATIENT
Start: 2023-12-15 | End: 2023-12-19 | Stop reason: HOSPADM

## 2023-12-15 RX ORDER — METOPROLOL TARTRATE 25 MG/1
25 TABLET, FILM COATED ORAL 2 TIMES DAILY
COMMUNITY

## 2023-12-15 RX ORDER — NITROFURANTOIN 25; 75 MG/1; MG/1
CAPSULE ORAL
COMMUNITY
Start: 2023-10-23 | End: 2023-12-15

## 2023-12-15 RX ORDER — LEVOFLOXACIN 500 MG/1
TABLET, FILM COATED ORAL
COMMUNITY
Start: 2023-10-30 | End: 2023-12-15

## 2023-12-15 RX ORDER — LOSARTAN POTASSIUM 25 MG/1
25 TABLET ORAL DAILY
COMMUNITY
Start: 2023-12-14

## 2023-12-15 RX ORDER — ONDANSETRON 4 MG/1
4 TABLET, FILM COATED ORAL EVERY 6 HOURS PRN
COMMUNITY
Start: 2023-12-14

## 2023-12-15 RX ORDER — PANTOPRAZOLE SODIUM 40 MG/10ML
80 INJECTION, POWDER, LYOPHILIZED, FOR SOLUTION INTRAVENOUS
Status: COMPLETED | OUTPATIENT
Start: 2023-12-15 | End: 2023-12-15

## 2023-12-15 RX ORDER — FUROSEMIDE 20 MG/1
40 TABLET ORAL DAILY
Status: ON HOLD | COMMUNITY
Start: 2023-12-14 | End: 2023-12-19 | Stop reason: HOSPADM

## 2023-12-15 RX ORDER — HYDRALAZINE HYDROCHLORIDE 20 MG/ML
10 INJECTION INTRAMUSCULAR; INTRAVENOUS EVERY 8 HOURS PRN
Status: DISCONTINUED | OUTPATIENT
Start: 2023-12-15 | End: 2023-12-17

## 2023-12-15 RX ORDER — PANTOPRAZOLE SODIUM 40 MG/10ML
40 INJECTION, POWDER, LYOPHILIZED, FOR SOLUTION INTRAVENOUS 2 TIMES DAILY
Status: DISCONTINUED | OUTPATIENT
Start: 2023-12-15 | End: 2023-12-19 | Stop reason: HOSPADM

## 2023-12-15 RX ORDER — CARBAMIDE PEROXIDE 65 MG/ML
SOLUTION/ DROPS AURICULAR (OTIC)
COMMUNITY
Start: 2023-07-05 | End: 2023-12-15

## 2023-12-15 RX ORDER — ONDANSETRON 2 MG/ML
4 INJECTION INTRAMUSCULAR; INTRAVENOUS
Status: COMPLETED | OUTPATIENT
Start: 2023-12-15 | End: 2023-12-15

## 2023-12-15 RX ORDER — PANTOPRAZOLE SODIUM 40 MG/1
40 TABLET, DELAYED RELEASE ORAL DAILY
COMMUNITY

## 2023-12-15 RX ADMIN — PHENOBARBITAL SODIUM 97.5 MG: 65 INJECTION INTRAMUSCULAR; INTRAVENOUS at 08:12

## 2023-12-15 RX ADMIN — SODIUM CHLORIDE, POTASSIUM CHLORIDE, SODIUM LACTATE AND CALCIUM CHLORIDE: 600; 310; 30; 20 INJECTION, SOLUTION INTRAVENOUS at 04:12

## 2023-12-15 RX ADMIN — IOHEXOL 75 ML: 350 INJECTION, SOLUTION INTRAVENOUS at 02:12

## 2023-12-15 RX ADMIN — PANTOPRAZOLE SODIUM 40 MG: 40 INJECTION, POWDER, LYOPHILIZED, FOR SOLUTION INTRAVENOUS at 09:12

## 2023-12-15 RX ADMIN — ONDANSETRON 4 MG: 2 INJECTION INTRAMUSCULAR; INTRAVENOUS at 01:12

## 2023-12-15 RX ADMIN — DIATRIZOATE MEGLUMINE AND DIATRIZOATE SODIUM 100 ML: 660; 100 LIQUID ORAL; RECTAL at 01:12

## 2023-12-15 RX ADMIN — HYDRALAZINE HYDROCHLORIDE 10 MG: 20 INJECTION, SOLUTION INTRAMUSCULAR; INTRAVENOUS at 04:12

## 2023-12-15 RX ADMIN — PANTOPRAZOLE SODIUM 40 MG: 40 INJECTION, POWDER, LYOPHILIZED, FOR SOLUTION INTRAVENOUS at 08:12

## 2023-12-15 RX ADMIN — SODIUM CHLORIDE, POTASSIUM CHLORIDE, SODIUM LACTATE AND CALCIUM CHLORIDE: 600; 310; 30; 20 INJECTION, SOLUTION INTRAVENOUS at 07:12

## 2023-12-15 RX ADMIN — LABETALOL HYDROCHLORIDE 10 MG: 5 INJECTION INTRAVENOUS at 08:12

## 2023-12-15 RX ADMIN — PANTOPRAZOLE SODIUM 80 MG: 40 INJECTION, POWDER, LYOPHILIZED, FOR SOLUTION INTRAVENOUS at 01:12

## 2023-12-15 NOTE — ASSESSMENT & PLAN NOTE
Concern for coffee ground emeisis in patient with history of previous upper GI bleed. No active bleeding noted. Hgb normal.     - continue IV protonix.   - NPO.   - Monitor CBC.   - GI consulted. Appreciate recommendations.

## 2023-12-15 NOTE — PHARMACY MED REC
"[facility]           Pharmacy  Admission Medication History     The home medication history was taken by Marcia Tabares.      Medication history obtained from Medications listed below were obtained from: Nursing home    Based on information gathered for medication list, you may go to "Admission" then "Reconcile Home Medications" tabs to review and/or act upon those items.     The home medication list has been updated by the Pharmacy department.   Please read ALL comments highlighted in yellow.   Please address this information as you see fit.    Feel free to contact us if you have any questions or require assistance.    The medications listed below were removed from the home medication list.  Please reorder if appropriate:    Patient reports NOT TAKING the following medication(s):  Levaquin 500mg  Macrobid 100mg  Clearcanal earwax      No current facility-administered medications on file prior to encounter.     Current Outpatient Medications on File Prior to Encounter   Medication Sig Dispense Refill    acetaminophen (TYLENOL) 325 MG tablet Take 2 tablets (650 mg total) by mouth every 4 (four) hours as needed for Pain.  0    apixaban (ELIQUIS) 2.5 mg Tab Take 1 tablet (2.5 mg total) by mouth 2 (two) times daily.      calcium-vitamin D 600 mg(1,500mg) -400 unit Tab Take 1 tablet by mouth once daily.      docusate sodium (COLACE) 100 MG capsule Take 1 capsule (100 mg total) by mouth 2 (two) times daily.  0    furosemide (LASIX) 20 MG tablet Take 20 mg by mouth once daily.      furosemide (LASIX) 20 MG tablet Take 40 mg by mouth once daily. For 3 days then resume regular dose      levothyroxine (SYNTHROID) 125 MCG tablet TAKE 1 TABLET (125 MCG TOTAL) BY MOUTH ONCE DAILY. 90 tablet 3    losartan (COZAAR) 25 MG tablet Take 25 mg by mouth once daily.      magnesium oxide (MAG-OX) 400 mg (241.3 mg magnesium) tablet Take 2 tablets (800 mg total) by mouth once daily.  0    metoprolol tartrate " (LOPRESSOR) 25 MG tablet Take 25 mg by mouth 2 (two) times daily.      ondansetron (ZOFRAN) 4 MG tablet Take 4 mg by mouth every 6 (six) hours as needed for Nausea (vomiting).      OXcarbazepine (TRILEPTAL) 150 MG Tab Take 150 mg by mouth nightly.      pantoprazole (PROTONIX) 40 MG tablet Take 40 mg by mouth once daily.      PHENobarbitaL 97.2 MG tablet Take 97.2 mg by mouth every evening.      polyethylene glycol (GLYCOLAX) 17 gram PwPk Take 17 g by mouth once daily. 30 packet 5    potassium chloride SA (K-DUR,KLOR-CON) 20 MEQ tablet Take 20 mEq by mouth once daily.      pravastatin (PRAVACHOL) 20 MG tablet Take 20 mg by mouth once daily.      vitamin D (VITAMIN D3) 1000 units Tab Take 1,000 Units by mouth once daily.         Please address this information as you see fit.  Feel free to contact us if you have any questions or require assistance.    Marcia Tabares  [phone]                  .

## 2023-12-15 NOTE — ED NOTES
Pt returned from CT scan and it was reported by radiology tech that pts 20g IV in her L wrist infiltrated and had a small goose egg.     PIV removed and warm compress applied.

## 2023-12-15 NOTE — HPI
Ms. Annette Garcia is a 87 y/o White F w/ PMH of HTN, seizure disorder, afib on eliquis, LLE DVD, May-Thurner Syndrome, SBO s/p colostomy, and osteoporosis who presents from [facility] w/ 2 day history of multiple episodes of vomiting and abdominal pain. States she did not notice any blood, however as per EMS they noticed coffee ground emisis. Denied any fever, chills, SOB, chest pain.     In the ED, cbc and bmp unremarkable. CTA abdomen concerning for SBO. ED staff spoke w/ General surgery and it was decided to place NG tube and put it on suction.

## 2023-12-15 NOTE — CONSULTS
Patient ID: Annette Garcia is a 88 y.o. female.    Chief Complaint: Emesis (Pt from [facility] c/o vomiting.  States abdomen hurts a little.  EMT states pt had coffee grind emesis.)      HPI:  HPI  88F know to me from previous colon resection, end colostomy and subsequent parastomal hernia repair. Was doing well until about two days ago when she began having crampy ab pain, nausea vomiting. Ostomy still putting out stool. NG placed in ED, light green output, about 400cc. Feels comfortable.     Review of Systems   Constitutional:  Negative for fever.   HENT:  Negative for trouble swallowing.    Respiratory:  Negative for shortness of breath.    Cardiovascular:  Negative for chest pain.   Gastrointestinal:  Positive for abdominal pain, nausea and vomiting. Negative for blood in stool.   Genitourinary:  Negative for dysuria.   Musculoskeletal:  Negative for gait problem.   Skin:  Negative for rash and wound.   Allergic/Immunologic: Negative for immunocompromised state.   Neurological:  Negative for weakness.   Hematological:  Does not bruise/bleed easily.   Psychiatric/Behavioral:  Negative for agitation.        Current Facility-Administered Medications   Medication Dose Route Frequency Provider Last Rate Last Admin    diatrizoate meglumineand-diatrizoate sodium (GASTROVIEW) solution 100 mL  100 mL Per NG tube Once Chris Johnson MD        hydrALAZINE injection 10 mg  10 mg Intravenous Q8H PRN Niko Blood MD        lactated ringers infusion   Intravenous Continuous Niko Blood MD 75 mL/hr at 12/15/23 0736 New Bag at 12/15/23 0736    pantoprazole injection 40 mg  40 mg Intravenous BID Niko Blood MD   40 mg at 12/15/23 0944    phenobarbital injection 97.5 mg  97.5 mg Intramuscular QHS Niko Blood MD         Current Outpatient Medications   Medication Sig Dispense Refill    acetaminophen (TYLENOL) 325 MG tablet Take 2 tablets (650 mg total) by mouth every 4 (four) hours as  needed for Pain.  0    apixaban (ELIQUIS) 2.5 mg Tab Take 1 tablet (2.5 mg total) by mouth 2 (two) times daily.      calcium-vitamin D 600 mg(1,500mg) -400 unit Tab Take 1 tablet by mouth once daily.      docusate sodium (COLACE) 100 MG capsule Take 1 capsule (100 mg total) by mouth 2 (two) times daily.  0    furosemide (LASIX) 20 MG tablet Take 20 mg by mouth once daily.      levothyroxine (SYNTHROID) 125 MCG tablet TAKE 1 TABLET (125 MCG TOTAL) BY MOUTH ONCE DAILY. 90 tablet 3    losartan (COZAAR) 50 MG tablet Take 0.5 tablets (25 mg total) by mouth once daily. 90 tablet 3    magnesium oxide (MAG-OX) 400 mg (241.3 mg magnesium) tablet Take 2 tablets (800 mg total) by mouth once daily.  0    metoprolol tartrate (LOPRESSOR) 25 MG tablet Take 1 tablet (25 mg total) by mouth 2 (two) times daily. 60 tablet 11    OXcarbazepine (TRILEPTAL) 150 MG Tab Take 150 mg by mouth nightly.      pantoprazole (PROTONIX) 40 MG tablet Take 1 tablet (40 mg total) by mouth once daily. 90 tablet 0    PHENobarbitaL 97.2 MG tablet Take 97.2 mg by mouth every evening.      polyethylene glycol (GLYCOLAX) 17 gram PwPk Take 17 g by mouth once daily. 30 packet 5    potassium chloride SA (K-DUR,KLOR-CON) 20 MEQ tablet Take 20 mEq by mouth once daily.      pravastatin (PRAVACHOL) 20 MG tablet Take 20 mg by mouth once daily.      vitamin D (VITAMIN D3) 1000 units Tab Take 1,000 Units by mouth once daily.         Review of patient's allergies indicates:   Allergen Reactions    Adhesive Itching and Blisters    Penicillins Anaphylaxis    Tramadol Hives    Avelox [moxifloxacin] Rash     Facial and arm itching and redness. Pt states throat closes when given.    Amoxil [amoxicillin]     Aspridrox [aspirin, buffered]     Codeine Other (See Comments)     Throat swelling    Keflex [cephalexin]      Tolerated cefepime and cefazolin    Norvasc [amlodipine]     Red dye Hives    Robitussin [guaifenesin]     Sulfa (sulfonamide antibiotics)     Tylenol  [acetaminophen]      Has reaction to Tylenol with red dye and unable to take Extra Strength Tylenol/ CAN ONLY TOLERATE REG STRENGTH TYLENOL    Vicks vaporub [camphor-eucalyptus oil-menthol]        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       Past Surgical History:   Procedure Laterality Date    APPENDECTOMY      BACK SURGERY  1988    vertebral fracture    BACK SURGERY  02/2013    lumbar L2-5    CATARACT EXTRACTION, BILATERAL Bilateral     CHOLECYSTECTOMY      open    ESOPHAGOGASTRODUODENOSCOPY N/A 2/15/2023    Procedure: EGD (ESOPHAGOGASTRODUODENOSCOPY);  Surgeon: Keith Chavarria MD;  Location: [facility] ENDO;  Service: Endoscopy;  Laterality: N/A;    EYE SURGERY Bilateral     cataract removal with lens implant    HYSTERECTOMY      PERCUTANEOUS TRANSLUMINAL ANGIOPLASTY (PTA) OF PERIPHERAL VESSEL N/A 2/3/2020    Procedure: PTA, PERIPHERAL VESSEL;  Surgeon: Timmy Simmons MD;  Location: [facility] CATH LAB/EP;  Service: Cardiology;  Laterality: N/A;    PORTACATH PLACEMENT Right 09/2016    RENAL ARTERY STENT Left 07/19/2017    ROBOT-ASSISTED LAPAROSCOPIC REPAIR OF VENTRAL HERNIA N/A 3/23/2023    Procedure: ROBOTIC REPAIR, HERNIA, VENTRAL;  Surgeon: Chris Johnson MD;  Location: [facility] OR;  Service: General;  Laterality: N/A;  Robotic Parastomal hernia repair    SIGMOIDECTOMY  10/29/2019    SMALL INTESTINE SURGERY  08/23/2016    THROMBECTOMY N/A 2/3/2020    Procedure: THROMBECTOMY;  Surgeon: Timmy Simmons MD;  Location: [facility] CATH  LAB/EP;  Service: Cardiology;  Laterality: N/A;    TONSILLECTOMY      VAGINAL HYSTERECTOMY W/ ANTERIOR AND POSTERIOR VAGINAL REPAIR         N/A  Family History   Problem Relation Age of Onset    Pancreatic cancer Mother     Hypertension Father     Heart attack Father        Social History     Socioeconomic History    Marital status:    Tobacco Use    Smoking status: Never    Smokeless tobacco: Never   Substance and Sexual Activity    Alcohol use: No    Drug use: No    Sexual activity: Not Currently     Partners: Male     Social Determinants of Health     Financial Resource Strain: Low Risk  (10/13/2022)    Overall Financial Resource Strain (CARDIA)     Difficulty of Paying Living Expenses: Not hard at all   Food Insecurity: No Food Insecurity (10/13/2022)    Hunger Vital Sign     Worried About Running Out of Food in the Last Year: Never true     Ran Out of Food in the Last Year: Never true   Transportation Needs: No Transportation Needs (10/13/2022)    PRAPARE - Transportation     Lack of Transportation (Medical): No     Lack of Transportation (Non-Medical): No   Physical Activity: Inactive (10/13/2022)    Exercise Vital Sign     Days of Exercise per Week: 0 days     Minutes of Exercise per Session: 0 min   Stress: No Stress Concern Present (10/13/2022)    [employer] - Occupational Stress Questionnaire     Feeling of Stress : Not at all   Social Connections: Unknown (3/22/2023)    Social Connection and Isolation Panel [NHANES]     Marital Status:    Housing Stability: Low Risk  (10/13/2022)    Housing Stability Vital Sign     Unable to Pay for Housing in the Last Year: No     Number of Places Lived in the Last Year: 1     Unstable Housing in the Last Year: No       Vitals:    12/15/23 0832   BP: (!) 152/70   Pulse: 84   Resp: 19   Temp:        Physical Exam  Constitutional:       General: She is not in acute distress.     Appearance: She is well-developed.   HENT:       Head: Normocephalic and atraumatic.   Eyes:      General: No scleral icterus.  Cardiovascular:      Rate and Rhythm: Normal rate.   Pulmonary:      Effort: Pulmonary effort is normal.      Breath sounds: No stridor.   Abdominal:      General: There is no distension.      Palpations: Abdomen is soft.      Tenderness: There is no abdominal tenderness.      Comments: Brown stool from ostomy   Lymphadenopathy:      Cervical: No cervical adenopathy.   Skin:     General: Skin is warm.      Findings: No erythema.   Neurological:      Mental Status: She is alert and oriented to person, place, and time.   Psychiatric:         Behavior: Behavior normal.     CT reviewed, dilated small bowel  WBC normal    Assessment & Plan:  88F with presume adhesion partial SBO  NG placed, continue to suction. Suction needs to be connected to NG tube not the blue sump port, reconnected.  Gastrografin challenge  Would hold eliquis over the weekend in case needs dx lap  NPO for now, will follow up on XRs

## 2023-12-15 NOTE — SUBJECTIVE & OBJECTIVE
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       Past Surgical History:   Procedure Laterality Date    APPENDECTOMY      BACK SURGERY  1988    vertebral fracture    BACK SURGERY  02/2013    lumbar L2-5    CATARACT EXTRACTION, BILATERAL Bilateral     CHOLECYSTECTOMY      open    ESOPHAGOGASTRODUODENOSCOPY N/A 2/15/2023    Procedure: EGD (ESOPHAGOGASTRODUODENOSCOPY);  Surgeon: Keith Chavarria MD;  Location: [facility] ENDO;  Service: Endoscopy;  Laterality: N/A;    EYE SURGERY Bilateral     cataract removal with lens implant    HYSTERECTOMY      PERCUTANEOUS TRANSLUMINAL ANGIOPLASTY (PTA) OF PERIPHERAL VESSEL N/A 2/3/2020    Procedure: PTA, PERIPHERAL VESSEL;  Surgeon: Timmy Simmons MD;  Location: [facility] CATH LAB/EP;  Service: Cardiology;  Laterality: N/A;    PORTACATH PLACEMENT Right 09/2016    RENAL ARTERY STENT Left 07/19/2017    ROBOT-ASSISTED LAPAROSCOPIC REPAIR OF VENTRAL HERNIA N/A 3/23/2023    Procedure: ROBOTIC REPAIR, HERNIA, VENTRAL;  Surgeon: Chris Johnson MD;  Location: [facility] OR;  Service: General;  Laterality: N/A;  Robotic Parastomal hernia repair    SIGMOIDECTOMY  10/29/2019    SMALL INTESTINE SURGERY  08/23/2016    THROMBECTOMY N/A 2/3/2020    Procedure: THROMBECTOMY;  Surgeon: Timmy Simmons MD;  Location: [facility] CATH LAB/EP;  Service: Cardiology;  Laterality: N/A;    TONSILLECTOMY      VAGINAL HYSTERECTOMY W/ ANTERIOR AND POSTERIOR VAGINAL REPAIR         Review of patient's allergies indicates:   Allergen  Reactions    Adhesive Itching and Blisters    Penicillins Anaphylaxis    Tramadol Hives    Avelox [moxifloxacin] Rash     Facial and arm itching and redness. Pt states throat closes when given.    Amoxil [amoxicillin]     Aspridrox [aspirin, buffered]     Codeine Other (See Comments)     Throat swelling    Keflex [cephalexin]      Tolerated cefepime and cefazolin    Norvasc [amlodipine]     Red dye Hives    Robitussin [guaifenesin]     Sulfa (sulfonamide antibiotics)     Tylenol [acetaminophen]      Has reaction to Tylenol with red dye and unable to take Extra Strength Tylenol/ CAN ONLY TOLERATE REG STRENGTH TYLENOL    Vicks vaporub [camphor-eucalyptus oil-menthol]        No current facility-administered medications on file prior to encounter.     Current Outpatient Medications on File Prior to Encounter   Medication Sig    acetaminophen (TYLENOL) 325 MG tablet Take 2 tablets (650 mg total) by mouth every 4 (four) hours as needed for Pain.    apixaban (ELIQUIS) 2.5 mg Tab Take 1 tablet (2.5 mg total) by mouth 2 (two) times daily.    calcium-vitamin D 600 mg(1,500mg) -400 unit Tab Take 1 tablet by mouth once daily.    docusate sodium (COLACE) 100 MG capsule Take 1 capsule (100 mg total) by mouth 2 (two) times daily.    furosemide (LASIX) 20 MG tablet Take 20 mg by mouth once daily.    levothyroxine (SYNTHROID) 125 MCG tablet TAKE 1 TABLET (125 MCG TOTAL) BY MOUTH ONCE DAILY.    losartan (COZAAR) 50 MG tablet Take 0.5 tablets (25 mg total) by mouth once daily.    magnesium oxide (MAG-OX) 400 mg (241.3 mg magnesium) tablet Take 2 tablets (800 mg total) by mouth once daily.    metoprolol tartrate (LOPRESSOR) 25 MG tablet Take 1 tablet (25 mg total) by mouth 2 (two) times daily.    OXcarbazepine (TRILEPTAL) 150 MG Tab Take 150 mg by mouth nightly.    pantoprazole (PROTONIX) 40 MG tablet Take 1 tablet (40 mg total) by mouth once daily.    PHENobarbitaL 97.2 MG tablet Take 97.2 mg by mouth every evening.    polyethylene  glycol (GLYCOLAX) 17 gram PwPk Take 17 g by mouth once daily.    potassium chloride SA (K-DUR,KLOR-CON) 20 MEQ tablet Take 20 mEq by mouth once daily.    pravastatin (PRAVACHOL) 20 MG tablet Take 20 mg by mouth once daily.    vitamin D (VITAMIN D3) 1000 units Tab Take 1,000 Units by mouth once daily.     Family History       Problem Relation (Age of Onset)    Heart attack Father    Hypertension Father    Pancreatic cancer Mother          Tobacco Use    Smoking status: Never    Smokeless tobacco: Never   Substance and Sexual Activity    Alcohol use: No    Drug use: No    Sexual activity: Not Currently     Partners: Male     Review of Systems 12 point ROS negative except for HPI  Objective:     Vital Signs (Most Recent):  Temp: 97.6 °F (36.4 °C) (12/15/23 0052)  Pulse: 80 (12/15/23 0431)  Resp: 19 (12/15/23 0431)  BP: (!) 163/69 (12/15/23 0431)  SpO2: 95 % (12/15/23 0424) Vital Signs (24h Range):  Temp:  [97.6 °F (36.4 °C)] 97.6 °F (36.4 °C)  Pulse:  [80-88] 80  Resp:  [16-19] 19  SpO2:  [93 %-97 %] 95 %  BP: (139-187)/(68-88) 163/69        There is no height or weight on file to calculate BMI.     Physical Exam  Constitutional:       Appearance: She is well-developed.   HENT:      Head:      Comments: NG tube  Neck:      Vascular: No JVD.   Cardiovascular:      Rate and Rhythm: Normal rate and regular rhythm.      Heart sounds: No murmur heard.     No friction rub. No gallop.   Pulmonary:      Effort: Pulmonary effort is normal.      Breath sounds: Normal breath sounds. No wheezing or rales.   Abdominal:      General: Bowel sounds are decreased.      Palpations: Abdomen is soft.      Tenderness: There is no abdominal tenderness.      Comments: colostomy   Musculoskeletal:      Cervical back: Normal range of motion and neck supple.   Skin:     General: Skin is warm and dry.   Neurological:      Mental Status: She is alert and oriented to person, place, and time.                Significant Labs: All pertinent labs  within the past 24 hours have been reviewed.    Significant Imaging: I have reviewed all pertinent imaging results/findings within the past 24 hours.

## 2023-12-15 NOTE — PLAN OF CARE
VN cued into pt's room for introduction with pt's permission.  VN role explained and informed pt that VN would be working with bedside nurse and the rest of the care team.  Fall risk and bed alarm protocol education provided.  Instructed pt to call for assistance and agreeable.  Allowed time for questions.   Will cont to be available as needed.      12/15/23 6292   Patient Request   Patient Requested pain and nausea medication   Nurse Notification   Bedside Nurse Notified? Yes   Name of Bedside Nurse [email or web address]   Nurse Notfication Method Secure Chat   Nurse Notified Of Patient Request;Orders  (bp 195/84)   Admission   Initial VN Admission Questions Complete   Communication Issues? Patient Hearing   Shift   Virtual Nurse - Patient Verbalized Approval Of Camera Use;VN Rounding   Safety/Activity   Patient Rounds call light in patient/parent reach;visualized patient;clutter free environment maintained   Safety Promotion/Fall Prevention Fall Risk reviewed with patient/family;instructed to call staff for mobility   Pain/Comfort/Sleep   Preferred Pain Scale number (Numeric Rating Pain Scale)   Pain Body Location abdomen   Pain Rating (0-10): Rest 5

## 2023-12-15 NOTE — Clinical Note
Diagnosis: Nausea and vomiting [636887]   Future Attending Provider: HOLLY ANGELES [[id number]]   Admitting Provider:: TR TREJO [[id number]]   Special Needs:: Fall Risk [15]

## 2023-12-15 NOTE — HPI
This is an 88-year-old lady history of AFib on Eliquis, May-Thurner, history of colostomy and prior SBO who presented for multiple episodes of vomiting and abdominal pain.  In route the EMS reported that perhaps 1 of them did look like coffee grinds.    The patient states she does not recall having dark emesis, she refers it was more brown to her.  She has had prior episodes similar to this.  She does not recall having an EGD 9 months ago but I reviewed this with her.  She is having diffuse abdominal pain that is slowly resolving.  She has an NG that is putting out green bile , nonbloody. No further vomiting  No radiating symptoms. No fever or chills      Ct reviewed showing SBO

## 2023-12-15 NOTE — SUBJECTIVE & OBJECTIVE
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       Past Surgical History:   Procedure Laterality Date    APPENDECTOMY      BACK SURGERY  1988    vertebral fracture    BACK SURGERY  02/2013    lumbar L2-5    CATARACT EXTRACTION, BILATERAL Bilateral     CHOLECYSTECTOMY      open    ESOPHAGOGASTRODUODENOSCOPY N/A 2/15/2023    Procedure: EGD (ESOPHAGOGASTRODUODENOSCOPY);  Surgeon: Keith Chavarria MD;  Location: [facility] ENDO;  Service: Endoscopy;  Laterality: N/A;    EYE SURGERY Bilateral     cataract removal with lens implant    HYSTERECTOMY      PERCUTANEOUS TRANSLUMINAL ANGIOPLASTY (PTA) OF PERIPHERAL VESSEL N/A 2/3/2020    Procedure: PTA, PERIPHERAL VESSEL;  Surgeon: Timmy Simmons MD;  Location: [facility] CATH LAB/EP;  Service: Cardiology;  Laterality: N/A;    PORTACATH PLACEMENT Right 09/2016    RENAL ARTERY STENT Left 07/19/2017    ROBOT-ASSISTED LAPAROSCOPIC REPAIR OF VENTRAL HERNIA N/A 3/23/2023    Procedure: ROBOTIC REPAIR, HERNIA, VENTRAL;  Surgeon: Chris Johnson MD;  Location: [facility] OR;  Service: General;  Laterality: N/A;  Robotic Parastomal hernia repair    SIGMOIDECTOMY  10/29/2019    SMALL INTESTINE SURGERY  08/23/2016    THROMBECTOMY N/A 2/3/2020    Procedure: THROMBECTOMY;  Surgeon: Timmy Simmons MD;  Location: [facility] CATH LAB/EP;  Service: Cardiology;  Laterality: N/A;    TONSILLECTOMY      VAGINAL HYSTERECTOMY W/ ANTERIOR AND POSTERIOR VAGINAL REPAIR         Review of patient's allergies indicates:   Allergen  Reactions    Adhesive Itching and Blisters    Penicillins Anaphylaxis    Tramadol Hives    Avelox [moxifloxacin] Rash     Facial and arm itching and redness. Pt states throat closes when given.    Amoxil [amoxicillin]     Aspridrox [aspirin, buffered]     Codeine Other (See Comments)     Throat swelling    Keflex [cephalexin]      Tolerated cefepime and cefazolin    Norvasc [amlodipine]     Red dye Hives    Robitussin [guaifenesin]     Sulfa (sulfonamide antibiotics)     Tylenol [acetaminophen]      Has reaction to Tylenol with red dye and unable to take Extra Strength Tylenol/ CAN ONLY TOLERATE REG STRENGTH TYLENOL    Vicks vaporub [camphor-eucalyptus oil-menthol]      Family History       Problem Relation (Age of Onset)    Heart attack Father    Hypertension Father    Pancreatic cancer Mother          Tobacco Use    Smoking status: Never    Smokeless tobacco: Never   Substance and Sexual Activity    Alcohol use: No    Drug use: No    Sexual activity: Not Currently     Partners: Male     Review of Systems   Constitutional:  Positive for activity change and appetite change. Negative for chills, fatigue and fever.   HENT:  Negative for mouth sores and nosebleeds.    Eyes:  Negative for pain and redness.   Respiratory:  Negative for cough and shortness of breath.    Cardiovascular:  Negative for chest pain and palpitations.   Gastrointestinal:  Positive for abdominal distention, abdominal pain, nausea and vomiting.   Genitourinary:  Negative for dysuria and hematuria.   Musculoskeletal:  Negative for arthralgias and joint swelling.   Neurological:  Negative for seizures and facial asymmetry.   Psychiatric/Behavioral:  Negative for agitation and confusion.      Objective:     Vital Signs (Most Recent):  Temp: 97.6 °F (36.4 °C) (12/15/23 0052)  Pulse: 84 (12/15/23 0832)  Resp: 19 (12/15/23 0832)  BP: (!) 152/70 (12/15/23 0832)  SpO2: 95 % (12/15/23 0832) Vital Signs (24h Range):  Temp:  [97.6 °F (36.4 °C)] 97.6 °F (36.4  °C)  Pulse:  [77-96] 84  Resp:  [16-20] 19  SpO2:  [93 %-97 %] 95 %  BP: (108-187)/(59-88) 152/70        There is no height or weight on file to calculate BMI.    No intake or output data in the 24 hours ending 12/15/23 1210    Lines/Drains/Airways       Drain  Duration                  Colostomy 10/29/19 1200 LLQ 1508 days         NG/OG Tube 12/15/23 0336 [place] sump 14 Fr. Right nostril <1 day              Peripheral Intravenous Line  Duration                  Peripheral IV - Single Lumen 12/15/23 0105 22 G Right Hand <1 day                     Physical Exam  Vitals reviewed.   Constitutional:       General: She is not in acute distress.     Appearance: She is not diaphoretic.   HENT:      Head: Normocephalic and atraumatic.      Nose:      Comments: Ng tube  Eyes:      General: No scleral icterus.     Conjunctiva/sclera: Conjunctivae normal.   Cardiovascular:      Rate and Rhythm: Normal rate.      Heart sounds: Normal heart sounds.   Pulmonary:      Effort: Pulmonary effort is normal. No respiratory distress.      Breath sounds: Normal breath sounds. No stridor.   Abdominal:      General: There is distension.      Palpations: There is no mass.      Tenderness: There is abdominal tenderness. There is no guarding or rebound.      Comments: Ostomy in place   Musculoskeletal:         General: No tenderness or deformity.      Cervical back: Neck supple.   Lymphadenopathy:      Cervical: No cervical adenopathy.   Skin:     General: Skin is warm and dry.      Findings: No rash.   Neurological:      Mental Status: She is alert and oriented to person, place, and time.      Gait: Gait normal.   Psychiatric:         Mood and Affect: Mood normal.         Behavior: Behavior normal.          Significant Labs:  CBC:   Recent Labs   Lab 12/15/23  0117 12/15/23  0935   WBC 8.05 7.46   HGB 13.3 12.5   HCT 39.2 36.1*    177     BMP:   Recent Labs   Lab 12/15/23  0700   *      K 4.1   CL 96   CO2 26   BUN 15    CREATININE 0.8   CALCIUM 9.5   MG 1.8     CMP:   Recent Labs   Lab 12/15/23  0700   *   CALCIUM 9.5   ALBUMIN 3.7   PROT 7.3      K 4.1   CO2 26   CL 96   BUN 15   CREATININE 0.8   ALKPHOS 93   ALT 19   AST 23   BILITOT 0.4       Significant Imaging:  Imaging results within the past 24 hours have been reviewed.

## 2023-12-15 NOTE — ASSESSMENT & PLAN NOTE
- NPO  - NG tube to suction.   - Start LR at 75cc/hr.   - General surgery consulted. Appreciate recommendations.

## 2023-12-15 NOTE — H&P
[facility] Emergency [facility] Medicine  History & Physical    Patient Name: Annette Garcia  MRN: [id number]  Patient Class: OP- Observation  Admission Date: 12/15/2023  Attending Physician: Niko Blood MD  Primary Care Provider: Jose Valles MD         Patient information was obtained from patient, past medical records, and ER records.     Subjective:     Principal Problem:SBO (small bowel obstruction)    Chief Complaint:   Chief Complaint   Patient presents with    Emesis     Pt from [facility] c/o vomiting.  States abdomen hurts a little.  EMT states pt had coffee grind emesis.        HPI: Ms. Annette Garcia is a 87 y/o White F w/ PMH of HTN, seizure disorder, afib on eliquis, LLE DVD, May-Thurner Syndrome, SBO s/p colostomy, and osteoporosis who presents from [facility] w/ 2 day history of multiple episodes of vomiting and abdominal pain. States she did not notice any blood, however as per EMS they noticed coffee ground emisis. Denied any fever, chills, SOB, chest pain.     In the ED, cbc and bmp unremarkable. CTA abdomen concerning for SBO. ED staff spoke w/ General surgery and it was decided to place NG tube and put it on suction.     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       Past Surgical History:   Procedure Laterality Date    APPENDECTOMY      BACK SURGERY  1988    vertebral fracture    BACK SURGERY  02/2013    lumbar L2-5    CATARACT EXTRACTION, BILATERAL Bilateral      CHOLECYSTECTOMY      open    ESOPHAGOGASTRODUODENOSCOPY N/A 2/15/2023    Procedure: EGD (ESOPHAGOGASTRODUODENOSCOPY);  Surgeon: Keith Chavarria MD;  Location: [facility] ENDO;  Service: Endoscopy;  Laterality: N/A;    EYE SURGERY Bilateral     cataract removal with lens implant    HYSTERECTOMY      PERCUTANEOUS TRANSLUMINAL ANGIOPLASTY (PTA) OF PERIPHERAL VESSEL N/A 2/3/2020    Procedure: PTA, PERIPHERAL VESSEL;  Surgeon: Timmy Simmons MD;  Location: [facility] CATH LAB/EP;  Service: Cardiology;  Laterality: N/A;    PORTACATH PLACEMENT Right 09/2016    RENAL ARTERY STENT Left 07/19/2017    ROBOT-ASSISTED LAPAROSCOPIC REPAIR OF VENTRAL HERNIA N/A 3/23/2023    Procedure: ROBOTIC REPAIR, HERNIA, VENTRAL;  Surgeon: Chris Johnson MD;  Location: [facility] OR;  Service: General;  Laterality: N/A;  Robotic Parastomal hernia repair    SIGMOIDECTOMY  10/29/2019    SMALL INTESTINE SURGERY  08/23/2016    THROMBECTOMY N/A 2/3/2020    Procedure: THROMBECTOMY;  Surgeon: Timmy Simmons MD;  Location: [facility] CATH LAB/EP;  Service: Cardiology;  Laterality: N/A;    TONSILLECTOMY      VAGINAL HYSTERECTOMY W/ ANTERIOR AND POSTERIOR VAGINAL REPAIR         Review of patient's allergies indicates:   Allergen Reactions    Adhesive Itching and Blisters    Penicillins Anaphylaxis    Tramadol Hives    Avelox [moxifloxacin] Rash     Facial and arm itching and redness. Pt states throat closes when given.    Amoxil [amoxicillin]     Aspridrox [aspirin, buffered]     Codeine Other (See Comments)     Throat swelling    Keflex [cephalexin]      Tolerated cefepime and cefazolin    Norvasc [amlodipine]     Red dye Hives    Robitussin [guaifenesin]     Sulfa (sulfonamide antibiotics)     Tylenol [acetaminophen]      Has reaction to Tylenol with red dye and unable to take Extra Strength Tylenol/ CAN ONLY TOLERATE REG STRENGTH TYLENOL    Vicks vaporub [camphor-eucalyptus oil-menthol]        No current facility-administered medications on file prior to  encounter.     Current Outpatient Medications on File Prior to Encounter   Medication Sig    acetaminophen (TYLENOL) 325 MG tablet Take 2 tablets (650 mg total) by mouth every 4 (four) hours as needed for Pain.    apixaban (ELIQUIS) 2.5 mg Tab Take 1 tablet (2.5 mg total) by mouth 2 (two) times daily.    calcium-vitamin D 600 mg(1,500mg) -400 unit Tab Take 1 tablet by mouth once daily.    docusate sodium (COLACE) 100 MG capsule Take 1 capsule (100 mg total) by mouth 2 (two) times daily.    furosemide (LASIX) 20 MG tablet Take 20 mg by mouth once daily.    levothyroxine (SYNTHROID) 125 MCG tablet TAKE 1 TABLET (125 MCG TOTAL) BY MOUTH ONCE DAILY.    losartan (COZAAR) 50 MG tablet Take 0.5 tablets (25 mg total) by mouth once daily.    magnesium oxide (MAG-OX) 400 mg (241.3 mg magnesium) tablet Take 2 tablets (800 mg total) by mouth once daily.    metoprolol tartrate (LOPRESSOR) 25 MG tablet Take 1 tablet (25 mg total) by mouth 2 (two) times daily.    OXcarbazepine (TRILEPTAL) 150 MG Tab Take 150 mg by mouth nightly.    pantoprazole (PROTONIX) 40 MG tablet Take 1 tablet (40 mg total) by mouth once daily.    PHENobarbitaL 97.2 MG tablet Take 97.2 mg by mouth every evening.    polyethylene glycol (GLYCOLAX) 17 gram PwPk Take 17 g by mouth once daily.    potassium chloride SA (K-DUR,KLOR-CON) 20 MEQ tablet Take 20 mEq by mouth once daily.    pravastatin (PRAVACHOL) 20 MG tablet Take 20 mg by mouth once daily.    vitamin D (VITAMIN D3) 1000 units Tab Take 1,000 Units by mouth once daily.     Family History       Problem Relation (Age of Onset)    Heart attack Father    Hypertension Father    Pancreatic cancer Mother          Tobacco Use    Smoking status: Never    Smokeless tobacco: Never   Substance and Sexual Activity    Alcohol use: No    Drug use: No    Sexual activity: Not Currently     Partners: Male     Review of Systems 12 point ROS negative except for HPI  Objective:     Vital Signs (Most Recent):  Temp: 97.6 °F  (36.4 °C) (12/15/23 0052)  Pulse: 80 (12/15/23 0431)  Resp: 19 (12/15/23 0431)  BP: (!) 163/69 (12/15/23 0431)  SpO2: 95 % (12/15/23 0424) Vital Signs (24h Range):  Temp:  [97.6 °F (36.4 °C)] 97.6 °F (36.4 °C)  Pulse:  [80-88] 80  Resp:  [16-19] 19  SpO2:  [93 %-97 %] 95 %  BP: (139-187)/(68-88) 163/69        There is no height or weight on file to calculate BMI.     Physical Exam  Constitutional:       Appearance: She is well-developed.   HENT:      Head:      Comments: NG tube  Neck:      Vascular: No JVD.   Cardiovascular:      Rate and Rhythm: Normal rate and regular rhythm.      Heart sounds: No murmur heard.     No friction rub. No gallop.   Pulmonary:      Effort: Pulmonary effort is normal.      Breath sounds: Normal breath sounds. No wheezing or rales.   Abdominal:      General: Bowel sounds are decreased.      Palpations: Abdomen is soft.      Tenderness: There is no abdominal tenderness.      Comments: colostomy   Musculoskeletal:      Cervical back: Normal range of motion and neck supple.   Skin:     General: Skin is warm and dry.   Neurological:      Mental Status: She is alert and oriented to person, place, and time.                Significant Labs: All pertinent labs within the past 24 hours have been reviewed.    Significant Imaging: I have reviewed all pertinent imaging results/findings within the past 24 hours.  Assessment/Plan:     * SBO (small bowel obstruction)  - NPO  - NG tube to suction.   - Start LR at 75cc/hr.   - General surgery consulted. Appreciate recommendations.       GI bleed  Concern for coffee ground emeisis in patient with history of previous upper GI bleed. No active bleeding noted. Hgb normal.     - continue IV protonix.   - NPO.   - Monitor CBC.   - GI consulted. Appreciate recommendations.       A-fib  - currently NSR.   - Hold AC for now due to concern for GI bleed.     Hypertension  - PRN hydralazine.     Seizure disorder  IM phenobarbital every evening for now until she can  resume home PO meds.         VTE Risk Mitigation (From admission, onward)           Ordered     IP VTE HIGH RISK PATIENT  Once         12/15/23 0316     Place sequential compression device  Until discontinued         12/15/23 0316                       On 12/15/2023, patient should be placed in hospital observation services under my care.             Niko Blood MD  Department of Hospital Medicine  [place] - Emergency Dept

## 2023-12-15 NOTE — ED PROVIDER NOTES
ED Provider Note - 12/15/2023    History     Chief Complaint   Patient presents with    Emesis     Pt from [facility] c/o vomiting.  States abdomen hurts a little.  EMT states pt had coffee grind emesis.       HPI     Annette Garcia is a 88 y.o. year old female with past medical and surgical history as seen below, presenting with chief complaint of nausea and vomiting.  Onset today.  EMS reports seeing coffee-ground type emesis.  Patient also with generalized and mild abdominal pain.  No fever or chills.  Colostomy with brown stool output.  No chest pain or shortness of breath.      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     Past Surgical History:   Procedure Laterality Date    APPENDECTOMY      BACK SURGERY  1988    vertebral fracture    BACK SURGERY  02/2013    lumbar L2-5    CATARACT EXTRACTION, BILATERAL Bilateral     CHOLECYSTECTOMY      open    ESOPHAGOGASTRODUODENOSCOPY N/A 2/15/2023    Procedure: EGD (ESOPHAGOGASTRODUODENOSCOPY);  Surgeon: Keith Chavarria MD;  Location: [facility] ENDO;  Service: Endoscopy;  Laterality: N/A;    EYE SURGERY Bilateral     cataract removal with lens implant    HYSTERECTOMY      PERCUTANEOUS TRANSLUMINAL ANGIOPLASTY (PTA) OF PERIPHERAL VESSEL N/A 2/3/2020    Procedure: PTA, PERIPHERAL VESSEL;  Surgeon: Timmy Simmons MD;  Location: [facility] CATH LAB/EP;  Service: Cardiology;  Laterality: N/A;    PORTACATH PLACEMENT Right 09/2016    RENAL ARTERY STENT Left 07/19/2017     ROBOT-ASSISTED LAPAROSCOPIC REPAIR OF VENTRAL HERNIA N/A 3/23/2023    Procedure: ROBOTIC REPAIR, HERNIA, VENTRAL;  Surgeon: Chris Johnson MD;  Location: [facility] OR;  Service: General;  Laterality: N/A;  Robotic Parastomal hernia repair    SIGMOIDECTOMY  10/29/2019    SMALL INTESTINE SURGERY  08/23/2016    THROMBECTOMY N/A 2/3/2020    Procedure: THROMBECTOMY;  Surgeon: Timmy Simmons MD;  Location: [facility] CATH LAB/EP;  Service: Cardiology;  Laterality: N/A;    TONSILLECTOMY      VAGINAL HYSTERECTOMY W/ ANTERIOR AND POSTERIOR VAGINAL REPAIR           Family History   Problem Relation Age of Onset    Pancreatic cancer Mother     Hypertension Father     Heart attack Father      Social History     Tobacco Use    Smoking status: Never    Smokeless tobacco: Never   Substance Use Topics    Alcohol use: No    Drug use: No     Social Determinants of Health with Concerns     Physical Activity: Inactive (10/13/2022)    Exercise Vital Sign     Days of Exercise per Week: 0 days     Minutes of Exercise per Session: 0 min   Social Connections: Unknown (3/22/2023)    Social Connection and Isolation Panel [NHANES]     Frequency of Communication with Friends and Family: Not on file     Frequency of Social Gatherings with Friends and Family: Not on file     Attends [employer] Services: Not on file     Active Member of Clubs or Organizations: Not on file     Attends Club or Organization Meetings: Not on file     Marital Status:    Depression: Not on file      Review of patient's allergies indicates:   Allergen Reactions    Adhesive Itching and Blisters    Penicillins Anaphylaxis    Tramadol Hives    Avelox [moxifloxacin] Rash     Facial and arm itching and redness. Pt states throat closes when given.    Amoxil [amoxicillin]     Aspridrox [aspirin, buffered]     Codeine Other (See Comments)     Throat swelling    Keflex [cephalexin]      Tolerated cefepime and cefazolin    Norvasc [amlodipine]     Red dye Hives    Robitussin  [guaifenesin]     Sulfa (sulfonamide antibiotics)     Tylenol [acetaminophen]      Has reaction to Tylenol with red dye and unable to take Extra Strength Tylenol/ CAN ONLY TOLERATE REG STRENGTH TYLENOL    Vicks vaporub [camphor-eucalyptus oil-menthol]        Review of Systems     A full Review of Systems (ROS) was performed and was negative unless otherwise stated in the HPI.      Physical Exam     Vitals:    12/15/23 0052 12/15/23 0102   BP: 139/68 (!) 177/77   Patient Position: Sitting    Pulse: 83 87   Resp: 18    Temp: 97.6 °F (36.4 °C)    TempSrc: Oral    SpO2: (!) 93% (!) 94%        Physical Exam    Nursing note and vitals reviewed.  Constitutional: She appears well-developed and well-nourished. No distress.   HENT:   Head: Normocephalic and atraumatic.   Right Ear: External ear normal.   Left Ear: External ear normal.   Nose: Nose normal.   Mouth/Throat: Oropharynx is clear and moist.   Eyes: Conjunctivae and EOM are normal. Pupils are equal, round, and reactive to light.   Neck: Neck supple.   Normal range of motion.  Cardiovascular:  Normal rate, regular rhythm, normal heart sounds and intact distal pulses.           Pulmonary/Chest: Breath sounds normal. No stridor. No respiratory distress. She has no wheezes. She has no rhonchi. She has no rales.   Abdominal: Abdomen is soft. Bowel sounds are normal. She exhibits no distension. Ostomy site is clean. There is generalized abdominal tenderness. No hernia.   Musculoskeletal:         General: No tenderness or edema. Normal range of motion.      Cervical back: Normal range of motion and neck supple.     Neurological: She is alert and oriented to person, place, and time. She has normal strength. No cranial nerve deficit or sensory deficit.   Skin: Skin is warm and dry. No rash noted.   Psychiatric: She has a normal mood and affect. Thought content normal.         Lab Results- Independently reviewed by myself      Labs Reviewed   CBC W/ AUTO DIFFERENTIAL -  Abnormal; Notable for the following components:       Result Value    MCH 32.4 (*)     Gran % 76.5 (*)     Lymph % 17.0 (*)     All other components within normal limits   COMPREHENSIVE METABOLIC PANEL - Abnormal; Notable for the following components:    Chloride 93 (*)     Glucose 145 (*)     Anion Gap 19 (*)     All other components within normal limits   LIPASE   PROTIME-INR   APTT   TYPE & SCREEN           Imaging     Imaging Results    None       X-Rays:   Independently Interpreted Readings:   Abdomen:   Abdomen and Pelvis CT with Contrast - Small Bowel: Obstruction present.      EKG Readings: (Independently Interpreted)   Initial Reading: No STEMI. Rhythm: Normal Sinus Rhythm. Heart Rate: 85. Ectopy: No Ectopy. Axis: Normal.           ED Course         ED US Guided Misc Procedure    Date/Time: 12/15/2023 1:46 AM    Performed by: Tree John MD  Authorized by: Tree John MD    Procedure:  Ultrasound-guided peripheral venous cannulation  Indication:  Failed or difficult IV access   Right   Antecubital  Procedure:  Dynamic ultrasound guidance used, Candidate vein examined with linear probe - confirmed collapsibility, lack of pulsatility, and proper anatomic location. and Using aseptic technique, IV catheter inserted with flash of blood noted, flow of venous blood confirmed.  Catheter gauge:  20   Flushes easily and without pain. Patient tolerated procedure well.  Complications:  None  Charge?:  Yes  Critical Care    Date/Time: 12/15/2023 4:11 PM    Performed by: Tree John MD  Authorized by: Tree John MD  Direct patient critical care time: 12 minutes  Additional history critical care time: 7 minutes  Ordering / reviewing critical care time: 7 minutes  Documentation critical care time: 8 minutes  Consulting other physicians critical care time: 8 minutes  Total critical care time (exclusive of procedural time) : 42 minutes  Critical care time was exclusive of separately billable procedures and  treating other patients and teaching time.  Critical care was necessary to treat or prevent imminent or life-threatening deterioration of the following conditions: SBO.  Critical care was time spent personally by me on the following activities: blood draw for specimens, discussions with consultants, interpretation of cardiac output measurements, evaluation of patient's response to treatment, examination of patient, obtaining history from patient or surrogate, ordering and review of laboratory studies, ordering and performing treatments and interventions, ordering and review of radiographic studies, pulse oximetry, re-evaluation of patient's condition and review of old charts.               Orders Placed This Encounter    CT Abdomen Pelvis With IV Contrast NO Oral Contrast    CBC auto differential    Comprehensive metabolic panel    Protime-INR    APTT    Lipase    NPO (Ice Chips)    Vital signs    Cardiac Monitoring - Adult    Pulse Oximetry Continuous    EKG 12-lead    Type & Screen    Saline lock IV (18 ga. or larger)    Possible Hospitalization    pantoprazole injection 80 mg    ondansetron injection 4 mg    ED US Guided Misc Procedure          ED Course as of 12/15/23 0147   Fri Dec 15, 2023   0144 CBC auto differential(!)  CBC: No significant anemia, platelet disorder, or leukocytosis.   [KB]      ED Course User Index  [KB] Tree John MD              Medical Decision Making       The patient's list of active medical problems, social history, medications, and allergies as documented per RN staff has been reviewed.     Comorbidities taken into consideration for development of diagnosis and treatment plan include HTN.      Medical Decision Making  87 YO F presenting with N/V/AP. Differential includes SBO, enteritis, viral syndrome, ACS, pancreatitis. Found to have SBO on CT. Discussed with Surg, Dr. Johnson. Recs NG Tube and medicine admission. D/w HM to continue evaluation and mngt.    Amount and/or  Complexity of Data Reviewed  Independent Historian: EMS  External Data Reviewed: notes.     Details: Surg and gi notes reviewed  Labs: ordered. Decision-making details documented in ED Course.  Radiology: ordered and independent interpretation performed.  ECG/medicine tests: ordered and independent interpretation performed.    Risk  Prescription drug management.  Decision regarding hospitalization.                    Clinical Impression       Disposition     ED Disposition Condition    Admit               Diagnosis    ICD-10-CM ICD-9-CM   1. Nausea and vomiting  R11.2 787.01   2. SBO (small bowel obstruction)  K56.609 560.9   3. Bowel obstruction  K56.609 560.9           Tree John MD        12/15/2023          DISCLAIMER: This note was prepared with [employer] voice recognition transcription software. Garbled syntax, mangled pronouns, and other bizarre constructions may be attributed to that software system.       Tree John MD  12/15/23 0559

## 2023-12-15 NOTE — ASSESSMENT & PLAN NOTE
Reported coffee ground emesis  Does have history of hiatal hernia    Currently with SBO    NG output currently is green bile without any blood    Recs:  Protonix IV BID while inpatient  Monitor NG output  Apprecaite surgery consultation    No plans for endoscopy given no continued bleeding and hgb stable    Call us back for future concerns or changes clinically.

## 2023-12-15 NOTE — ED NOTES
Received report from Alyx RN; pt AAO x4, NG tube in place to low intermittent suctioning, pt in no acute distress, denies pain.    Dr. Willard and Juventino NP updated pt on poc.    VSS, bed low and locked, siderails x2, call light in reach and instructed how to use, care ongoing.

## 2023-12-15 NOTE — ED TRIAGE NOTES
Pt presents to ED via [facility] for evaluation of vomiting. Pt reports she has thrown up so much that she lost count. Reported by EMS that emesis appeared like coffee-ground emesis. Pt reports she has a colostomy bag that was placed about 5 years ago due to a bowel obstruction. Pt states her abdomen hurts a little. Pt has not had an episode of emesis since arrival to ED.

## 2023-12-15 NOTE — PLAN OF CARE
CM met with pt at bedside to discuss discharge planning  Dx: SBO  Pt is independent with ADL's.Oriented x 4. Has a WC. She is a resident of [facility] and plans to return there when discharged. She has 2 daughters who she sees often. Upon discharge, hospital will provide transport back to NH. Pt made aware to contact CM with any questions or concerns. Cm will continue to follow and assist with any discharge needs     12/15/23 8339   Discharge Assessment   Assessment Type Discharge Planning Assessment   Confirmed/corrected address, phone number and insurance Yes   Confirmed Demographics Correct on Facesheet   Source of Information patient  (Simultaneous filing. User may not have seen previous data.)   Communicated JESSICA with patient/caregiver Date not available/Unable to determine   Reason For Admission SBO   People in Home facility resident   Do you expect to return to your current living situation? Yes   Prior to hospitilization cognitive status: Alert/Oriented   Current cognitive status: Alert/Oriented   Equipment Currently Used at Home wheelchair   Readmission within 30 days? No   Patient currently being followed by outpatient case management? No   Do you currently have service(s) that help you manage your care at home? No   Do you take prescription medications? Yes   Do you have prescription coverage? Yes   Do you have any problems affording any of your prescribed medications? No   Is the patient taking medications as prescribed? yes   Who is going to help you get home at discharge? Hospital transport   How do you get to doctors appointments? family or friend will provide   Are you on dialysis? No   Do you take coumadin? No   Discharge Plan A Return to nursing home   DME Needed Upon Discharge  none   Discharge Plan discussed with: Patient   Transition of Care Barriers None   Physical Activity   On average, how many days per week do you engage in moderate to strenuous exercise (like a brisk walk)? 0 days   On  average, how many minutes do you engage in exercise at this level? 0 min   Financial Resource Strain   How hard is it for you to pay for the very basics like food, housing, medical care, and heating? Not hard   Housing Stability   In the last 12 months, was there a time when you were not able to pay the mortgage or rent on time? N   In the last 12 months, how many places have you lived? 1   In the last 12 months, was there a time when you did not have a steady place to sleep or slept in a shelter (including now)? N   Transportation Needs   In the past 12 months, has lack of transportation kept you from medical appointments or from getting medications? no   In the past 12 months, has lack of transportation kept you from meetings, work, or from getting things needed for daily living? No   Food Insecurity   Within the past 12 months, you worried that your food would run out before you got the money to buy more. Never true   Within the past 12 months, the food you bought just didn't last and you didn't have money to get more. Never true   Social Connections   In a typical week, how many times do you talk on the phone with family, friends, or neighbors? More than 3   How often do you get together with friends or relatives? More than 3   How often do you attend Mormonism or Christianity services? More than 4   Do you belong to any clubs or organizations such as Mormonism groups, unions, fraternal or athletic groups, or school groups? No   How often do you attend meetings of the clubs or organizations you belong to? Never   Are you , , , , never , or living with a partner?    Alcohol Use   Q1: How often do you have a drink containing alcohol? Never   Q2: How many drinks containing alcohol do you have on a typical day when you are drinking? None   Q3: How often do you have six or more drinks on one occasion? Never

## 2023-12-15 NOTE — CARE UPDATE
Patient seen and examined at the bedside.   SBO (small bowel obstruction)  - NPO  -general surgery and GI consulted  - NG tube to suction.   - Start LR at 75cc/hr.       Juventino Melo NP

## 2023-12-16 LAB
ALBUMIN SERPL BCP-MCNC: 3.3 G/DL (ref 3.5–5.2)
ALP SERPL-CCNC: 82 U/L (ref 55–135)
ALT SERPL W/O P-5'-P-CCNC: 17 U/L (ref 10–44)
ANION GAP SERPL CALC-SCNC: 10 MMOL/L (ref 8–16)
ANION GAP SERPL CALC-SCNC: 10 MMOL/L (ref 8–16)
AST SERPL-CCNC: 18 U/L (ref 10–40)
BASOPHILS # BLD AUTO: 0.01 K/UL (ref 0–0.2)
BASOPHILS NFR BLD: 0.2 % (ref 0–1.9)
BILIRUB SERPL-MCNC: 0.5 MG/DL (ref 0.1–1)
BUN SERPL-MCNC: 17 MG/DL (ref 8–23)
BUN SERPL-MCNC: 18 MG/DL (ref 8–23)
CALCIUM SERPL-MCNC: 8.9 MG/DL (ref 8.7–10.5)
CALCIUM SERPL-MCNC: 9.3 MG/DL (ref 8.7–10.5)
CHLORIDE SERPL-SCNC: 97 MMOL/L (ref 95–110)
CHLORIDE SERPL-SCNC: 98 MMOL/L (ref 95–110)
CO2 SERPL-SCNC: 32 MMOL/L (ref 23–29)
CO2 SERPL-SCNC: 32 MMOL/L (ref 23–29)
CREAT SERPL-MCNC: 0.8 MG/DL (ref 0.5–1.4)
CREAT SERPL-MCNC: 0.8 MG/DL (ref 0.5–1.4)
DIFFERENTIAL METHOD: ABNORMAL
EOSINOPHIL # BLD AUTO: 0 K/UL (ref 0–0.5)
EOSINOPHIL NFR BLD: 0 % (ref 0–8)
ERYTHROCYTE [DISTWIDTH] IN BLOOD BY AUTOMATED COUNT: 13.9 % (ref 11.5–14.5)
EST. GFR  (NO RACE VARIABLE): >60 ML/MIN/1.73 M^2
EST. GFR  (NO RACE VARIABLE): >60 ML/MIN/1.73 M^2
GLUCOSE SERPL-MCNC: 114 MG/DL (ref 70–110)
GLUCOSE SERPL-MCNC: 117 MG/DL (ref 70–110)
HCT VFR BLD AUTO: 32.2 % (ref 37–48.5)
HGB BLD-MCNC: 10.7 G/DL (ref 12–16)
IMM GRANULOCYTES # BLD AUTO: 0.01 K/UL (ref 0–0.04)
IMM GRANULOCYTES NFR BLD AUTO: 0.2 % (ref 0–0.5)
LYMPHOCYTES # BLD AUTO: 1.1 K/UL (ref 1–4.8)
LYMPHOCYTES NFR BLD: 19.9 % (ref 18–48)
MAGNESIUM SERPL-MCNC: 1.9 MG/DL (ref 1.6–2.6)
MCH RBC QN AUTO: 32.7 PG (ref 27–31)
MCHC RBC AUTO-ENTMCNC: 33.2 G/DL (ref 32–36)
MCV RBC AUTO: 99 FL (ref 82–98)
MONOCYTES # BLD AUTO: 0.6 K/UL (ref 0.3–1)
MONOCYTES NFR BLD: 11.4 % (ref 4–15)
NEUTROPHILS # BLD AUTO: 3.8 K/UL (ref 1.8–7.7)
NEUTROPHILS NFR BLD: 68.3 % (ref 38–73)
NRBC BLD-RTO: 0 /100 WBC
PLATELET # BLD AUTO: 177 K/UL (ref 150–450)
PMV BLD AUTO: 10 FL (ref 9.2–12.9)
POTASSIUM SERPL-SCNC: 3.2 MMOL/L (ref 3.5–5.1)
POTASSIUM SERPL-SCNC: 3.7 MMOL/L (ref 3.5–5.1)
PROT SERPL-MCNC: 6.5 G/DL (ref 6–8.4)
RBC # BLD AUTO: 3.27 M/UL (ref 4–5.4)
SODIUM SERPL-SCNC: 139 MMOL/L (ref 136–145)
SODIUM SERPL-SCNC: 140 MMOL/L (ref 136–145)
WBC # BLD AUTO: 5.59 K/UL (ref 3.9–12.7)

## 2023-12-16 PROCEDURE — 21400001 HC TELEMETRY ROOM

## 2023-12-16 PROCEDURE — 36415 COLL VENOUS BLD VENIPUNCTURE: CPT | Performed by: STUDENT IN AN ORGANIZED HEALTH CARE EDUCATION/TRAINING PROGRAM

## 2023-12-16 PROCEDURE — 80053 COMPREHEN METABOLIC PANEL: CPT | Performed by: STUDENT IN AN ORGANIZED HEALTH CARE EDUCATION/TRAINING PROGRAM

## 2023-12-16 PROCEDURE — 36415 COLL VENOUS BLD VENIPUNCTURE: CPT | Performed by: NURSE PRACTITIONER

## 2023-12-16 PROCEDURE — 94761 N-INVAS EAR/PLS OXIMETRY MLT: CPT

## 2023-12-16 PROCEDURE — 99233 SBSQ HOSP IP/OBS HIGH 50: CPT | Mod: ,,, | Performed by: STUDENT IN AN ORGANIZED HEALTH CARE EDUCATION/TRAINING PROGRAM

## 2023-12-16 PROCEDURE — 85025 COMPLETE CBC W/AUTO DIFF WBC: CPT | Performed by: STUDENT IN AN ORGANIZED HEALTH CARE EDUCATION/TRAINING PROGRAM

## 2023-12-16 PROCEDURE — 99233 PR SUBSEQUENT HOSPITAL CARE,LEVL III: ICD-10-PCS | Mod: ,,, | Performed by: STUDENT IN AN ORGANIZED HEALTH CARE EDUCATION/TRAINING PROGRAM

## 2023-12-16 PROCEDURE — 83735 ASSAY OF MAGNESIUM: CPT | Performed by: STUDENT IN AN ORGANIZED HEALTH CARE EDUCATION/TRAINING PROGRAM

## 2023-12-16 PROCEDURE — 63600175 PHARM REV CODE 636 W HCPCS: Performed by: STUDENT IN AN ORGANIZED HEALTH CARE EDUCATION/TRAINING PROGRAM

## 2023-12-16 PROCEDURE — 80048 BASIC METABOLIC PNL TOTAL CA: CPT | Performed by: NURSE PRACTITIONER

## 2023-12-16 PROCEDURE — 27000221 HC OXYGEN, UP TO 24 HOURS

## 2023-12-16 PROCEDURE — 99900035 HC TECH TIME PER 15 MIN (STAT)

## 2023-12-16 PROCEDURE — 25000003 PHARM REV CODE 250: Performed by: NURSE PRACTITIONER

## 2023-12-16 PROCEDURE — C9113 INJ PANTOPRAZOLE SODIUM, VIA: HCPCS | Performed by: STUDENT IN AN ORGANIZED HEALTH CARE EDUCATION/TRAINING PROGRAM

## 2023-12-16 RX ORDER — LOSARTAN POTASSIUM 25 MG/1
25 TABLET ORAL DAILY
Status: DISCONTINUED | OUTPATIENT
Start: 2023-12-16 | End: 2023-12-17

## 2023-12-16 RX ORDER — METOPROLOL TARTRATE 25 MG/1
25 TABLET, FILM COATED ORAL 2 TIMES DAILY
Status: DISCONTINUED | OUTPATIENT
Start: 2023-12-16 | End: 2023-12-17

## 2023-12-16 RX ORDER — FUROSEMIDE 20 MG/1
20 TABLET ORAL DAILY
Status: DISCONTINUED | OUTPATIENT
Start: 2023-12-16 | End: 2023-12-17

## 2023-12-16 RX ORDER — PRAVASTATIN SODIUM 10 MG/1
20 TABLET ORAL DAILY
Status: DISCONTINUED | OUTPATIENT
Start: 2023-12-16 | End: 2023-12-17

## 2023-12-16 RX ORDER — ONDANSETRON 2 MG/ML
4 INJECTION INTRAMUSCULAR; INTRAVENOUS EVERY 8 HOURS PRN
Status: DISCONTINUED | OUTPATIENT
Start: 2023-12-16 | End: 2023-12-19 | Stop reason: HOSPADM

## 2023-12-16 RX ADMIN — LEVOTHYROXINE SODIUM 125 MCG: 25 TABLET ORAL at 09:12

## 2023-12-16 RX ADMIN — METOPROLOL TARTRATE 25 MG: 25 TABLET, FILM COATED ORAL at 09:12

## 2023-12-16 RX ADMIN — LOSARTAN POTASSIUM 25 MG: 25 TABLET, FILM COATED ORAL at 09:12

## 2023-12-16 RX ADMIN — SODIUM CHLORIDE, POTASSIUM CHLORIDE, SODIUM LACTATE AND CALCIUM CHLORIDE: 600; 310; 30; 20 INJECTION, SOLUTION INTRAVENOUS at 05:12

## 2023-12-16 RX ADMIN — POTASSIUM BICARBONATE 25 MEQ: 978 TABLET, EFFERVESCENT ORAL at 09:12

## 2023-12-16 RX ADMIN — FUROSEMIDE 20 MG: 20 TABLET ORAL at 09:12

## 2023-12-16 RX ADMIN — ONDANSETRON 4 MG: 2 INJECTION INTRAMUSCULAR; INTRAVENOUS at 02:12

## 2023-12-16 RX ADMIN — PANTOPRAZOLE SODIUM 40 MG: 40 INJECTION, POWDER, LYOPHILIZED, FOR SOLUTION INTRAVENOUS at 09:12

## 2023-12-16 RX ADMIN — PANTOPRAZOLE SODIUM 40 MG: 40 INJECTION, POWDER, LYOPHILIZED, FOR SOLUTION INTRAVENOUS at 08:12

## 2023-12-16 RX ADMIN — PHENOBARBITAL SODIUM 97.5 MG: 65 INJECTION INTRAMUSCULAR; INTRAVENOUS at 09:12

## 2023-12-16 RX ADMIN — PRAVASTATIN SODIUM 20 MG: 10 TABLET ORAL at 09:12

## 2023-12-16 NOTE — NURSING
Pt c/o nausea, vomiting episode x1. MD notified. Orders received for PRN medication. Medication administered per MAR.

## 2023-12-16 NOTE — PLAN OF CARE
Problem: Adult Inpatient Plan of Care  Goal: Plan of Care Review  Outcome: Ongoing, Progressing  Goal: Optimal Comfort and Wellbeing  Outcome: Ongoing, Progressing     Problem: Fall Injury Risk  Goal: Absence of Fall and Fall-Related Injury  Outcome: Ongoing, Progressing     Problem: Pain (Intestinal Obstruction)  Goal: Acceptable Pain Control  Outcome: Ongoing, Progressing

## 2023-12-16 NOTE — NURSING
Juventino NP notified of patient's bp 191/76 post hydralazine. NP stated she would resume home BP meds.

## 2023-12-16 NOTE — HPI
89yo female with history of sigmoidectomy with end colostomy two years ago complicated by parastomal hernia s/p repair 9 months prior who presents with SBO.

## 2023-12-16 NOTE — HOSPITAL COURSE
She was admitted to the hospital medicine service for further care.  CTA of the abdomen was concerning for small bowel obstruction.  General surgery was consulted.  NG tube was placed to suction--now clamped.  She was started on clear liquid diet per General surgery recommendations.  We will continue to monitor and replace electrolytes. KUB demonstrating no residual contrast. She now has ostomy output. No plans by General Surgery for operative intervention, as patient has return of bowel function. Patient seen and examined, she is feeling well, vital signs stable and she is tolerating regular diet. Patient seen,examined and deemed stable to discharge back to [facility] as skilled care.

## 2023-12-16 NOTE — ASSESSMENT & PLAN NOTE
Repeat KUB today  - no ostomy output yet   Continue NGT to LIWS   Continue to hold Eliquis  May require exploration if no progress over the weekend   Replace lytes as necessary - K+ 3.2 today; recommend IV replacement due to continue obstruction and limited absorption   Out of bed to chair - may rearrange chair so patient still hooked to suction

## 2023-12-16 NOTE — ASSESSMENT & PLAN NOTE
- NPO  - NG tube to suction.   - Start LR at 75cc/hr.   - General surgery consulted. Appreciate recommendations.     ---general sx recs:  Assessment & Plan:  88F with presume adhesion partial SBO  NG placed, continue to suction. Suction needs to be connected to NG tube not the blue sump port, reconnected.  Gastrografin challenge  Would hold eliquis over the weekend in case needs dx lap  NPO for now, will follow up on XRs

## 2023-12-16 NOTE — ED NOTES
APPEARANCE: Alert, oriented x 4, and in no acute distress.  HEENT: +right nostril NG tube, no s/s of discomfort, no breakdown.  NEURO: 5/5 strength major flexors/extensors bilaterally. Sensory intact to light touch bilaterally. Nola coma scale: eyes open spontaneously-4, oriented & converses-5, obeys commands-6. No neurological abnormalities. Denies HA, dizziness, photophobia.  CARDIAC: Normal rate and rhythm through apical/radial pulse, S1 and S2 noted, denies CP.   RESPIRATORY:Normal rate and effort, breath sounds clear bilaterally throughout chest anterior and posterior. Respirations are equal and unlabored, no obvious signs of distress. No SOB reported.  PERIPHERAL VASCULAR: +2 peripheral pulses present. Normal cap refill <3sec. No edema. Warm to touch.   GASTRO: +LLQ colostomy, brown stool Denies NVD.  MUSC: No bony tenderness or soft tissue tenderness. No obvious deformity. Wheelchair bound.  SKIN: Skin is warm and dry, normal skin turgor, mucous membranes moist.   GENITOURINARY: +incontinence, dry diaper Voids spontaneously, denies itching, burning, hematuria.    Patient hospital gowned. Side rails x 2, bed low and locked position, call light in reach and instructed how to use.

## 2023-12-16 NOTE — ASSESSMENT & PLAN NOTE
Concern for coffee ground emeisis in patient with history of previous upper GI bleed. No active bleeding noted. Hgb normal.     - continue IV protonix.   - NPO.   - Monitor CBC.   - GI consulted. Appreciate recommendations.     GI recs:  Recs:  Protonix IV BID while inpatient  Monitor NG output  Apprecaite surgery consultation     No plans for endoscopy given no continued bleeding and hgb stable

## 2023-12-16 NOTE — PROGRESS NOTES
[facility] Medicine  Progress Note    Patient Name: Annette Garcia  MRN: [id number]  Patient Class: IP- Inpatient   Admission Date: 12/15/2023  Length of Stay: 1 days  Attending Physician: Clarice Holloway MD  Primary Care Provider: Jose Valles MD        Subjective:     Principal Problem:SBO (small bowel obstruction)        HPI:  Ms. Annette Garcia is a 89 y/o White F w/ PMH of HTN, seizure disorder, afib on eliquis, LLE DVD, May-Thurner Syndrome, SBO s/p colostomy, and osteoporosis who presents from [facility] w/ 2 day history of multiple episodes of vomiting and abdominal pain. States she did not notice any blood, however as per EMS they noticed coffee ground emisis. Denied any fever, chills, SOB, chest pain.     In the ED, cbc and bmp unremarkable. CTA abdomen concerning for SBO. ED staff spoke w/ General surgery and it was decided to place NG tube and put it on suction.     Overview/Hospital Course:  She was admitted to the hospital medicine service for further care.  CTA of the abdomen was concerning for small bowel obstruction.  General surgery was consulted.  NG tube was placed to suction.  We will continue to monitor and replace electrolytes.    Interval History:  Seen at the bedside.  No distress noted.  Appreciate General surgery.  Continue low wall suction.    Review of Systems   Constitutional:  Negative for fatigue and fever.   HENT:  Negative for trouble swallowing.    Respiratory:  Negative for shortness of breath.    Cardiovascular:  Negative for chest pain.   Gastrointestinal:  Positive for abdominal distention and vomiting.   Genitourinary:  Negative for difficulty urinating and hematuria.   Psychiatric/Behavioral:  Negative for confusion.      Objective:     Vital Signs (Most Recent):  Temp: 97.9 °F (36.6 °C) (12/16/23 0711)  Pulse: 77 (12/16/23 0830)  Resp: 18 (12/16/23 0830)  BP: (!) 161/58 (12/16/23 0711)  SpO2: 96 % (12/16/23 0830) Vital Signs (24h  Range):  Temp:  [97.9 °F (36.6 °C)-99.5 °F (37.5 °C)] 97.9 °F (36.6 °C)  Pulse:  [] 77  Resp:  [16-20] 18  SpO2:  [94 %-100 %] 96 %  BP: (135-209)/(58-86) 161/58     Weight: 63.9 kg (140 lb 14 oz)  Body mass index is 30.48 kg/m².    Intake/Output Summary (Last 24 hours) at 12/16/2023 1024  Last data filed at 12/16/2023 0643  Gross per 24 hour   Intake 1528.13 ml   Output 1725 ml   Net -196.87 ml         Physical Exam  Vitals and nursing note reviewed.   Constitutional:       Appearance: She is ill-appearing.   HENT:      Head: Normocephalic and atraumatic.      Mouth/Throat:      Mouth: Mucous membranes are moist.   Eyes:      Extraocular Movements: Extraocular movements intact.   Cardiovascular:      Rate and Rhythm: Normal rate and regular rhythm.      Pulses: Normal pulses.      Heart sounds: Normal heart sounds.   Pulmonary:      Effort: Pulmonary effort is normal.      Breath sounds: No wheezing.   Abdominal:      General: There is distension.      Comments: +NG tube to wall suction   Musculoskeletal:         General: Normal range of motion.      Cervical back: Normal range of motion and neck supple.   Skin:     General: Skin is warm.      Capillary Refill: Capillary refill takes 2 to 3 seconds.   Neurological:      Mental Status: She is alert and oriented to person, place, and time.   Psychiatric:         Mood and Affect: Mood normal.         Behavior: Behavior normal.             Significant Labs: All pertinent labs within the past 24 hours have been reviewed.    Significant Imaging: I have reviewed all pertinent imaging results/findings within the past 24 hours.    Assessment/Plan:      * SBO (small bowel obstruction)  - NPO  - NG tube to suction.   - Start LR at 75cc/hr.   - General surgery consulted. Appreciate recommendations.     ---general sx recs:  Assessment & Plan:  88F with presume adhesion partial SBO  NG placed, continue to suction. Suction needs to be connected to NG tube not the blue sump  port, reconnected.  Gastrografin challenge  Would hold eliquis over the weekend in case needs dx lap  NPO for now, will follow up on XRs    GI bleed  Concern for coffee ground emeisis in patient with history of previous upper GI bleed. No active bleeding noted. Hgb normal.     - continue IV protonix.   - NPO.   - Monitor CBC.   - GI consulted. Appreciate recommendations.     GI recs:  Recs:  Protonix IV BID while inpatient  Monitor NG output  Apprecaite surgery consultation     No plans for endoscopy given no continued bleeding and hgb stable    A-fib  - currently NSR.   - Hold AC for now due to concern for GI bleed.     Hypertension  - PRN hydralazine.     Seizure disorder  IM phenobarbital every evening for now until she can resume home PO meds.         VTE Risk Mitigation (From admission, onward)           Ordered     IP VTE HIGH RISK PATIENT  Once         12/15/23 0316     Place sequential compression device  Until discontinued         12/15/23 0316                    Discharge Planning   JESSICA:      Code Status: Full Code   Is the patient medically ready for discharge?:     Reason for patient still in hospital (select all that apply): Laboratory test, Treatment, Imaging, and Consult recommendations  Discharge Plan A: Return to nursing home                  Juventino Melo NP  Department of Hospital Medicine   [facility] Surg

## 2023-12-16 NOTE — PROGRESS NOTES
[facility] Surg  General Surgery  Progress Note    Subjective:     History of Present Illness:  87yo female with history of sigmoidectomy with end colostomy two years ago complicated by parastomal hernia s/p repair 9 months prior who presents with SBO.     Post-Op Info:  * No surgery found *         Interval History:   Patient seen and examined.  NICOEON. SB follow through yesterday.  Last KUB with contrast in the stomach.  No ostomy output overnight.  1.3L NGT output yesterday.  Denies abdominal pain.     Medications:  Continuous Infusions:   lactated ringers 75 mL/hr at 12/16/23 0642     Scheduled Meds:   furosemide  20 mg Per NG tube Daily    levothyroxine  125 mcg Per NG tube Daily    losartan  25 mg Per NG tube Daily    metoprolol tartrate  25 mg Per NG tube BID    pantoprazole  40 mg Intravenous BID    PHENObarbital  97.5 mg Intramuscular QHS    pravastatin  20 mg Per NG tube Daily     PRN Meds:hydrALAZINE, ondansetron     Review of patient's allergies indicates:   Allergen Reactions    Adhesive Itching and Blisters    Penicillins Anaphylaxis    Tramadol Hives    Avelox [moxifloxacin] Rash     Facial and arm itching and redness. Pt states throat closes when given.    Amoxil [amoxicillin]     Aspridrox [aspirin, buffered]     Codeine Other (See Comments)     Throat swelling    Keflex [cephalexin]      Tolerated cefepime and cefazolin    Norvasc [amlodipine]     Red dye Hives    Robitussin [guaifenesin]     Sulfa (sulfonamide antibiotics)     Tylenol [acetaminophen]      Has reaction to Tylenol with red dye and unable to take Extra Strength Tylenol/ CAN ONLY TOLERATE REG STRENGTH TYLENOL    Vicks vaporub [camphor-eucalyptus oil-menthol]      Objective:     Vital Signs (Most Recent):  Temp: 98.1 °F (36.7 °C) (12/16/23 1111)  Pulse: 71 (12/16/23 1111)  Resp: 18 (12/16/23 1111)  BP: (!) 148/65 (12/16/23 1111)  SpO2: 97 % (12/16/23 1111) Vital Signs (24h Range):  Temp:  [97.9 °F (36.6 °C)-99.5 °F (37.5 °C)] 98.1 °F  (36.7 °C)  Pulse:  [] 71  Resp:  [16-20] 18  SpO2:  [94 %-100 %] 97 %  BP: (135-209)/(58-86) 148/65     Weight: 63.9 kg (140 lb 14 oz)  Body mass index is 30.48 kg/m².    Intake/Output - Last 3 Shifts         12/14 0700  12/15 0659 12/15 0700  12/16 0659 12/16 0700  12/17 0659    I.V. (mL/kg)  1528.1 (23.9)     Total Intake(mL/kg)  1528.1 (23.9)     Urine (mL/kg/hr)  400 (0.3)     Emesis/NG output  0     Drains  1325     Stool  0     Total Output  1725     Net  -196.9            Urine Occurrence  1 x     Emesis Occurrence  2 x              Physical Exam  Constitutional:       General: She is not in acute distress.     Appearance: She is not ill-appearing or toxic-appearing.   Cardiovascular:      Rate and Rhythm: Normal rate and regular rhythm.   Pulmonary:      Effort: Pulmonary effort is normal. No respiratory distress.   Abdominal:      General: There is no distension.      Palpations: Abdomen is soft.      Tenderness: There is no abdominal tenderness. There is no guarding.      Hernia: No hernia is present.      Comments: LLQ ostomy pink and patent without output in bag   Abdomen NTTP   Skin:     Capillary Refill: Capillary refill takes less than 2 seconds.   Neurological:      Mental Status: She is alert. Mental status is at baseline.          Significant Labs:  I have reviewed all pertinent lab results within the past 24 hours.  CBC:   Recent Labs   Lab 12/16/23  0657   WBC 5.59   RBC 3.27*   HGB 10.7*   HCT 32.2*      MCV 99*   MCH 32.7*   MCHC 33.2     CMP:   Recent Labs   Lab 12/16/23  0657   *   CALCIUM 9.3   ALBUMIN 3.3*   PROT 6.5      K 3.2*   CO2 32*   CL 98   BUN 17   CREATININE 0.8   ALKPHOS 82   ALT 17   AST 18   BILITOT 0.5       Significant Diagnostics:  I have reviewed all pertinent imaging results/findings within the past 24 hours.  Assessment/Plan:     * SBO (small bowel obstruction)  Repeat KUB today  - no ostomy output yet   Continue NGT to LIWS   Continue to hold  Eliquis  May require exploration if no progress over the weekend   Replace lytes as necessary - K+ 3.2 today; recommend IV replacement due to continue obstruction and limited absorption   Out of bed to chair - may rearrange chair so patient still hooked to suction    Hypertension  Mild HTN - 170s/70  Recommend PRN Hydralazine for SBP > 160s due to limited absorption of PO medications        Asia Paredes MD  General Surgery  [facility] S[facility]

## 2023-12-16 NOTE — SUBJECTIVE & OBJECTIVE
Interval History:  Seen at the bedside.  No distress noted.  Appreciate General surgery.  Continue low wall suction.    Review of Systems   Constitutional:  Negative for fatigue and fever.   HENT:  Negative for trouble swallowing.    Respiratory:  Negative for shortness of breath.    Cardiovascular:  Negative for chest pain.   Gastrointestinal:  Positive for abdominal distention and vomiting.   Genitourinary:  Negative for difficulty urinating and hematuria.   Psychiatric/Behavioral:  Negative for confusion.      Objective:     Vital Signs (Most Recent):  Temp: 97.9 °F (36.6 °C) (12/16/23 0711)  Pulse: 77 (12/16/23 0830)  Resp: 18 (12/16/23 0830)  BP: (!) 161/58 (12/16/23 0711)  SpO2: 96 % (12/16/23 0830) Vital Signs (24h Range):  Temp:  [97.9 °F (36.6 °C)-99.5 °F (37.5 °C)] 97.9 °F (36.6 °C)  Pulse:  [] 77  Resp:  [16-20] 18  SpO2:  [94 %-100 %] 96 %  BP: (135-209)/(58-86) 161/58     Weight: 63.9 kg (140 lb 14 oz)  Body mass index is 30.48 kg/m².    Intake/Output Summary (Last 24 hours) at 12/16/2023 1024  Last data filed at 12/16/2023 0643  Gross per 24 hour   Intake 1528.13 ml   Output 1725 ml   Net -196.87 ml         Physical Exam  Vitals and nursing note reviewed.   Constitutional:       Appearance: She is ill-appearing.   HENT:      Head: Normocephalic and atraumatic.      Mouth/Throat:      Mouth: Mucous membranes are moist.   Eyes:      Extraocular Movements: Extraocular movements intact.   Cardiovascular:      Rate and Rhythm: Normal rate and regular rhythm.      Pulses: Normal pulses.      Heart sounds: Normal heart sounds.   Pulmonary:      Effort: Pulmonary effort is normal.      Breath sounds: No wheezing.   Abdominal:      General: There is distension.      Comments: +NG tube to wall suction   Musculoskeletal:         General: Normal range of motion.      Cervical back: Normal range of motion and neck supple.   Skin:     General: Skin is warm.      Capillary Refill: Capillary refill takes 2 to 3  seconds.   Neurological:      Mental Status: She is alert and oriented to person, place, and time.   Psychiatric:         Mood and Affect: Mood normal.         Behavior: Behavior normal.             Significant Labs: All pertinent labs within the past 24 hours have been reviewed.    Significant Imaging: I have reviewed all pertinent imaging results/findings within the past 24 hours.

## 2023-12-16 NOTE — SUBJECTIVE & OBJECTIVE
Interval History:   Patient seen and examined.  KEVIN. SB follow through yesterday.  Last KUB with contrast in the stomach.  No ostomy output overnight.  1.3L NGT output yesterday.  Denies abdominal pain.     Medications:  Continuous Infusions:   lactated ringers 75 mL/hr at 12/16/23 0642     Scheduled Meds:   furosemide  20 mg Per NG tube Daily    levothyroxine  125 mcg Per NG tube Daily    losartan  25 mg Per NG tube Daily    metoprolol tartrate  25 mg Per NG tube BID    pantoprazole  40 mg Intravenous BID    PHENObarbital  97.5 mg Intramuscular QHS    pravastatin  20 mg Per NG tube Daily     PRN Meds:hydrALAZINE, ondansetron     Review of patient's allergies indicates:   Allergen Reactions    Adhesive Itching and Blisters    Penicillins Anaphylaxis    Tramadol Hives    Avelox [moxifloxacin] Rash     Facial and arm itching and redness. Pt states throat closes when given.    Amoxil [amoxicillin]     Aspridrox [aspirin, buffered]     Codeine Other (See Comments)     Throat swelling    Keflex [cephalexin]      Tolerated cefepime and cefazolin    Norvasc [amlodipine]     Red dye Hives    Robitussin [guaifenesin]     Sulfa (sulfonamide antibiotics)     Tylenol [acetaminophen]      Has reaction to Tylenol with red dye and unable to take Extra Strength Tylenol/ CAN ONLY TOLERATE REG STRENGTH TYLENOL    Vicks vaporub [camphor-eucalyptus oil-menthol]      Objective:     Vital Signs (Most Recent):  Temp: 98.1 °F (36.7 °C) (12/16/23 1111)  Pulse: 71 (12/16/23 1111)  Resp: 18 (12/16/23 1111)  BP: (!) 148/65 (12/16/23 1111)  SpO2: 97 % (12/16/23 1111) Vital Signs (24h Range):  Temp:  [97.9 °F (36.6 °C)-99.5 °F (37.5 °C)] 98.1 °F (36.7 °C)  Pulse:  [] 71  Resp:  [16-20] 18  SpO2:  [94 %-100 %] 97 %  BP: (135-209)/(58-86) 148/65     Weight: 63.9 kg (140 lb 14 oz)  Body mass index is 30.48 kg/m².    Intake/Output - Last 3 Shifts         12/14 0700  12/15 0659 12/15 0700  12/16 0659 12/16 0700  12/17 0659    I.V. (mL/kg)   1528.1 (23.9)     Total Intake(mL/kg)  1528.1 (23.9)     Urine (mL/kg/hr)  400 (0.3)     Emesis/NG output  0     Drains  1325     Stool  0     Total Output  1725     Net  -196.9            Urine Occurrence  1 x     Emesis Occurrence  2 x              Physical Exam  Constitutional:       General: She is not in acute distress.     Appearance: She is not ill-appearing or toxic-appearing.   Cardiovascular:      Rate and Rhythm: Normal rate and regular rhythm.   Pulmonary:      Effort: Pulmonary effort is normal. No respiratory distress.   Abdominal:      General: There is no distension.      Palpations: Abdomen is soft.      Tenderness: There is no abdominal tenderness. There is no guarding.      Hernia: No hernia is present.      Comments: LLQ ostomy pink and patent without output in bag   Abdomen NTTP   Skin:     Capillary Refill: Capillary refill takes less than 2 seconds.   Neurological:      Mental Status: She is alert. Mental status is at baseline.          Significant Labs:  I have reviewed all pertinent lab results within the past 24 hours.  CBC:   Recent Labs   Lab 12/16/23  0657   WBC 5.59   RBC 3.27*   HGB 10.7*   HCT 32.2*      MCV 99*   MCH 32.7*   MCHC 33.2     CMP:   Recent Labs   Lab 12/16/23  0657   *   CALCIUM 9.3   ALBUMIN 3.3*   PROT 6.5      K 3.2*   CO2 32*   CL 98   BUN 17   CREATININE 0.8   ALKPHOS 82   ALT 17   AST 18   BILITOT 0.5       Significant Diagnostics:  I have reviewed all pertinent imaging results/findings within the past 24 hours.

## 2023-12-16 NOTE — ASSESSMENT & PLAN NOTE
Mild HTN - 170s/70  Recommend PRN Hydralazine for SBP > 160s due to limited absorption of PO medications

## 2023-12-16 NOTE — PLAN OF CARE
Pt is AAOx4 with no complaints of pain or n/v. Pt with NG tube in place to LIWS draining 300cc brown drainage. Pt with colostomy not putting out anything this shift. Pt up in chair for a few hours this shift with the help of 2 nurses.

## 2023-12-17 LAB
ALBUMIN SERPL BCP-MCNC: 3.2 G/DL (ref 3.5–5.2)
ALP SERPL-CCNC: 81 U/L (ref 55–135)
ALT SERPL W/O P-5'-P-CCNC: 38 U/L (ref 10–44)
ANION GAP SERPL CALC-SCNC: 10 MMOL/L (ref 8–16)
AST SERPL-CCNC: 45 U/L (ref 10–40)
BASOPHILS # BLD AUTO: 0.02 K/UL (ref 0–0.2)
BASOPHILS NFR BLD: 0.4 % (ref 0–1.9)
BILIRUB SERPL-MCNC: 0.4 MG/DL (ref 0.1–1)
BUN SERPL-MCNC: 18 MG/DL (ref 8–23)
CALCIUM SERPL-MCNC: 8.6 MG/DL (ref 8.7–10.5)
CHLORIDE SERPL-SCNC: 99 MMOL/L (ref 95–110)
CO2 SERPL-SCNC: 30 MMOL/L (ref 23–29)
CREAT SERPL-MCNC: 0.7 MG/DL (ref 0.5–1.4)
DIFFERENTIAL METHOD: ABNORMAL
EOSINOPHIL # BLD AUTO: 0.1 K/UL (ref 0–0.5)
EOSINOPHIL NFR BLD: 2.7 % (ref 0–8)
ERYTHROCYTE [DISTWIDTH] IN BLOOD BY AUTOMATED COUNT: 13.8 % (ref 11.5–14.5)
EST. GFR  (NO RACE VARIABLE): >60 ML/MIN/1.73 M^2
GLUCOSE SERPL-MCNC: 97 MG/DL (ref 70–110)
HCT VFR BLD AUTO: 29.1 % (ref 37–48.5)
HGB BLD-MCNC: 9.8 G/DL (ref 12–16)
IMM GRANULOCYTES # BLD AUTO: 0.01 K/UL (ref 0–0.04)
IMM GRANULOCYTES NFR BLD AUTO: 0.2 % (ref 0–0.5)
LYMPHOCYTES # BLD AUTO: 1.2 K/UL (ref 1–4.8)
LYMPHOCYTES NFR BLD: 21.9 % (ref 18–48)
MAGNESIUM SERPL-MCNC: 1.7 MG/DL (ref 1.6–2.6)
MCH RBC QN AUTO: 32.7 PG (ref 27–31)
MCHC RBC AUTO-ENTMCNC: 33.7 G/DL (ref 32–36)
MCV RBC AUTO: 97 FL (ref 82–98)
MONOCYTES # BLD AUTO: 0.6 K/UL (ref 0.3–1)
MONOCYTES NFR BLD: 11.6 % (ref 4–15)
NEUTROPHILS # BLD AUTO: 3.3 K/UL (ref 1.8–7.7)
NEUTROPHILS NFR BLD: 63.2 % (ref 38–73)
NRBC BLD-RTO: 0 /100 WBC
PLATELET # BLD AUTO: 150 K/UL (ref 150–450)
PMV BLD AUTO: 10.3 FL (ref 9.2–12.9)
POTASSIUM SERPL-SCNC: 3.6 MMOL/L (ref 3.5–5.1)
PROT SERPL-MCNC: 5.9 G/DL (ref 6–8.4)
RBC # BLD AUTO: 3 M/UL (ref 4–5.4)
SODIUM SERPL-SCNC: 139 MMOL/L (ref 136–145)
WBC # BLD AUTO: 5.24 K/UL (ref 3.9–12.7)

## 2023-12-17 PROCEDURE — 85025 COMPLETE CBC W/AUTO DIFF WBC: CPT | Performed by: STUDENT IN AN ORGANIZED HEALTH CARE EDUCATION/TRAINING PROGRAM

## 2023-12-17 PROCEDURE — 25000003 PHARM REV CODE 250: Performed by: NURSE PRACTITIONER

## 2023-12-17 PROCEDURE — 83735 ASSAY OF MAGNESIUM: CPT | Performed by: STUDENT IN AN ORGANIZED HEALTH CARE EDUCATION/TRAINING PROGRAM

## 2023-12-17 PROCEDURE — 99900035 HC TECH TIME PER 15 MIN (STAT)

## 2023-12-17 PROCEDURE — 63600175 PHARM REV CODE 636 W HCPCS: Performed by: STUDENT IN AN ORGANIZED HEALTH CARE EDUCATION/TRAINING PROGRAM

## 2023-12-17 PROCEDURE — 21400001 HC TELEMETRY ROOM

## 2023-12-17 PROCEDURE — 63600175 PHARM REV CODE 636 W HCPCS: Performed by: NURSE PRACTITIONER

## 2023-12-17 PROCEDURE — 94761 N-INVAS EAR/PLS OXIMETRY MLT: CPT

## 2023-12-17 PROCEDURE — 93005 ELECTROCARDIOGRAM TRACING: CPT

## 2023-12-17 PROCEDURE — C9113 INJ PANTOPRAZOLE SODIUM, VIA: HCPCS | Performed by: STUDENT IN AN ORGANIZED HEALTH CARE EDUCATION/TRAINING PROGRAM

## 2023-12-17 PROCEDURE — 80053 COMPREHEN METABOLIC PANEL: CPT | Performed by: STUDENT IN AN ORGANIZED HEALTH CARE EDUCATION/TRAINING PROGRAM

## 2023-12-17 PROCEDURE — 36415 COLL VENOUS BLD VENIPUNCTURE: CPT | Performed by: STUDENT IN AN ORGANIZED HEALTH CARE EDUCATION/TRAINING PROGRAM

## 2023-12-17 PROCEDURE — 27000221 HC OXYGEN, UP TO 24 HOURS

## 2023-12-17 RX ORDER — HYDRALAZINE HYDROCHLORIDE 20 MG/ML
10 INJECTION INTRAMUSCULAR; INTRAVENOUS EVERY 8 HOURS PRN
Status: DISCONTINUED | OUTPATIENT
Start: 2023-12-17 | End: 2023-12-19 | Stop reason: HOSPADM

## 2023-12-17 RX ORDER — MAGNESIUM SULFATE HEPTAHYDRATE 40 MG/ML
2 INJECTION, SOLUTION INTRAVENOUS ONCE
Status: COMPLETED | OUTPATIENT
Start: 2023-12-17 | End: 2023-12-17

## 2023-12-17 RX ORDER — METOPROLOL TARTRATE 1 MG/ML
5 INJECTION, SOLUTION INTRAVENOUS EVERY 6 HOURS PRN
Status: DISCONTINUED | OUTPATIENT
Start: 2023-12-17 | End: 2023-12-19 | Stop reason: HOSPADM

## 2023-12-17 RX ORDER — FUROSEMIDE 10 MG/ML
20 INJECTION INTRAMUSCULAR; INTRAVENOUS DAILY
Status: DISCONTINUED | OUTPATIENT
Start: 2023-12-18 | End: 2023-12-19 | Stop reason: HOSPADM

## 2023-12-17 RX ADMIN — PRAVASTATIN SODIUM 20 MG: 10 TABLET ORAL at 09:12

## 2023-12-17 RX ADMIN — FUROSEMIDE 20 MG: 20 TABLET ORAL at 09:12

## 2023-12-17 RX ADMIN — SODIUM CHLORIDE, POTASSIUM CHLORIDE, SODIUM LACTATE AND CALCIUM CHLORIDE: 600; 310; 30; 20 INJECTION, SOLUTION INTRAVENOUS at 09:12

## 2023-12-17 RX ADMIN — PANTOPRAZOLE SODIUM 40 MG: 40 INJECTION, POWDER, LYOPHILIZED, FOR SOLUTION INTRAVENOUS at 09:12

## 2023-12-17 RX ADMIN — METOPROLOL TARTRATE 25 MG: 25 TABLET, FILM COATED ORAL at 09:12

## 2023-12-17 RX ADMIN — POTASSIUM BICARBONATE 25 MEQ: 978 TABLET, EFFERVESCENT ORAL at 11:12

## 2023-12-17 RX ADMIN — MAGNESIUM SULFATE IN WATER 2 G: 40 INJECTION, SOLUTION INTRAVENOUS at 11:12

## 2023-12-17 RX ADMIN — LEVOTHYROXINE SODIUM 125 MCG: 25 TABLET ORAL at 09:12

## 2023-12-17 RX ADMIN — PHENOBARBITAL SODIUM 97.5 MG: 65 INJECTION INTRAMUSCULAR; INTRAVENOUS at 09:12

## 2023-12-17 RX ADMIN — LOSARTAN POTASSIUM 25 MG: 25 TABLET, FILM COATED ORAL at 09:12

## 2023-12-17 RX ADMIN — SODIUM CHLORIDE, POTASSIUM CHLORIDE, SODIUM LACTATE AND CALCIUM CHLORIDE: 600; 310; 30; 20 INJECTION, SOLUTION INTRAVENOUS at 06:12

## 2023-12-17 NOTE — PROGRESS NOTES
[facility]  General Surgery  Progress Note    Subjective:     History of Present Illness:  87yo female with history of sigmoidectomy with end colostomy two years ago complicated by parastomal hernia s/p repair 9 months prior who presents with SBO.     Post-Op Info:  * No surgery found *         Interval History:   Patient was seen and examined.  Had mild nausea when NG tube clamped for medications.  Small amount of stool out colostomy overnight.  Denies abdominal pain.  Voices no other complaints.    Medications:  Continuous Infusions:   lactated ringers 75 mL/hr at 12/17/23 0755     Scheduled Meds:   furosemide  20 mg Per NG tube Daily    levothyroxine  125 mcg Per NG tube Daily    losartan  25 mg Per NG tube Daily    metoprolol tartrate  25 mg Per NG tube BID    pantoprazole  40 mg Intravenous BID    PHENObarbital  97.5 mg Intramuscular QHS    pravastatin  20 mg Per NG tube Daily     PRN Meds:hydrALAZINE, metoprolol, ondansetron     Review of patient's allergies indicates:   Allergen Reactions    Adhesive Itching and Blisters    Penicillins Anaphylaxis    Tramadol Hives    Avelox [moxifloxacin] Rash     Facial and arm itching and redness. Pt states throat closes when given.    Amoxil [amoxicillin]     Aspridrox [aspirin, buffered]     Codeine Other (See Comments)     Throat swelling    Keflex [cephalexin]      Tolerated cefepime and cefazolin    Norvasc [amlodipine]     Red dye Hives    Robitussin [guaifenesin]     Sulfa (sulfonamide antibiotics)     Tylenol [acetaminophen]      Has reaction to Tylenol with red dye and unable to take Extra Strength Tylenol/ CAN ONLY TOLERATE REG STRENGTH TYLENOL    Vicks vaporub [camphor-eucalyptus oil-menthol]      Objective:     Vital Signs (Most Recent):  Temp: 98.1 °F (36.7 °C) (12/17/23 1221)  Pulse: 77 (12/17/23 1221)  Resp: 18 (12/17/23 1221)  BP: 135/63 (12/17/23 1221)  SpO2: 96 % (12/17/23 1221) Vital Signs (24h Range):  Temp:  [97.5 °F (36.4 °C)-98.4 °F (36.9 °C)]  98.1 °F (36.7 °C)  Pulse:  [] 77  Resp:  [17-20] 18  SpO2:  [96 %-98 %] 96 %  BP: (125-150)/(60-66) 135/63     Weight: 65.7 kg (144 lb 13.5 oz)  Body mass index is 31.34 kg/m².    Intake/Output - Last 3 Shifts         12/15 0700 12/16 0659 12/16 0700 12/17 0659 12/17 0700 12/18 0659    P.O.  0     I.V. (mL/kg) 1528.1 (23.9) 808.1 (12.3) 1056.6 (16.1)    NG/GT   60    Total Intake(mL/kg) 1528.1 (23.9) 808.1 (12.3) 1116.6 (17)    Urine (mL/kg/hr) 400 (0.3) 400 (0.3)     Emesis/NG output 0      Drains 1325 400     Stool 0 0     Total Output 1725 800     Net -196.9 +8.1 +1116.6           Urine Occurrence 1 x      Emesis Occurrence 2 x               Physical Exam  Constitutional:       General: She is not in acute distress.     Appearance: She is not ill-appearing or toxic-appearing.   Cardiovascular:      Rate and Rhythm: Normal rate and regular rhythm.   Pulmonary:      Effort: Pulmonary effort is normal. No respiratory distress.   Abdominal:      General: There is no distension.      Palpations: Abdomen is soft.      Tenderness: There is no abdominal tenderness. There is no guarding.      Hernia: No hernia is present.      Comments: Left lower quadrant colostomy pink, patent, and viable with small amount of stool in bag.  No air   Skin:     Capillary Refill: Capillary refill takes less than 2 seconds.   Neurological:      Mental Status: She is alert. Mental status is at baseline.          Significant Labs:  I have reviewed all pertinent lab results within the past 24 hours.  CBC:   Recent Labs   Lab 12/17/23 0415   WBC 5.24   RBC 3.00*   HGB 9.8*   HCT 29.1*      MCV 97   MCH 32.7*   MCHC 33.7     CMP:   Recent Labs   Lab 12/17/23 0415   GLU 97   CALCIUM 8.6*   ALBUMIN 3.2*   PROT 5.9*      K 3.6   CO2 30*   CL 99   BUN 18   CREATININE 0.7   ALKPHOS 81   ALT 38   AST 45*   BILITOT 0.4       Significant Diagnostics:  I have reviewed all pertinent imaging results/findings within the past 24  hours.  Assessment/Plan:     * SBO (small bowel obstruction)  KUB yesterday demonstrating no residual contrast unsure if contrast passed or was NG tube   Patient with decreased NG tube output 400 cc yesterday with output more clear than yesterday  Patient is slowly progressing with no acute surgical intervention planned for today   Out of bed-to-chair   Recommend switching p.o. medications to IV were placements due to slowly progressing obstruction  Continue NG tube to low intermittent wall suction   Rest of care per primary    Hypertension  Mild HTN - 170s/70  Recommend PRN Hydralazine for SBP > 160s due to limited absorption of PO medications        Asia Paredes MD  General Surgery  [facility] Surg

## 2023-12-17 NOTE — NURSING
Notified Arekeva NP of patient's HR sustaining 120s and going as high as the 130s. Patient complaining of 5/10 pain. NP ordered EKG.

## 2023-12-17 NOTE — PROGRESS NOTES
[facility]  Progress Note    Patient Name: Annette Garcia  MRN: [id number]  Patient Class: IP- Inpatient   Admission Date: 12/15/2023  Length of Stay: 2 days  Attending Physician: Clarice Holloway MD  Primary Care Provider: Jose Valles MD        Subjective:     Principal Problem:SBO (small bowel obstruction)        HPI:  Ms. Annette Garcia is a 89 y/o White F w/ PMH of HTN, seizure disorder, afib on eliquis, LLE DVD, May-Thurner Syndrome, SBO s/p colostomy, and osteoporosis who presents from [facility] nursing home w/ 2 day history of multiple episodes of vomiting and abdominal pain. States she did not notice any blood, however as per EMS they noticed coffee ground emisis. Denied any fever, chills, SOB, chest pain.     In the ED, cbc and bmp unremarkable. CTA abdomen concerning for SBO. ED staff spoke w/ General surgery and it was decided to place NG tube and put it on suction.     Overview/Hospital Course:  She was admitted to the hospital medicine service for further care.  CTA of the abdomen was concerning for small bowel obstruction.  General surgery was consulted.  NG tube was placed to suction.  We will continue to monitor and replace electrolytes.    Interval History:  Seen at the bedside.  No distress noted.  Appreciate General surgery.  Continue low wall suction. Because of concerns of poor absorption will change  p.o. medications to IV forms.    Review of Systems   Constitutional:  Negative for fatigue and fever.   HENT:  Negative for trouble swallowing.    Respiratory:  Negative for shortness of breath.    Cardiovascular:  Negative for chest pain.   Gastrointestinal:  Positive for abdominal distention and vomiting.   Genitourinary:  Negative for difficulty urinating and hematuria.   Psychiatric/Behavioral:  Negative for confusion.      Objective:     Vital Signs (Most Recent):  Temp: 98.1 °F (36.7 °C) (12/17/23 1221)  Pulse: 77 (12/17/23 1221)  Resp: 18 (12/17/23  1221)  BP: 135/63 (12/17/23 1221)  SpO2: 96 % (12/17/23 1221) Vital Signs (24h Range):  Temp:  [97.5 °F (36.4 °C)-98.4 °F (36.9 °C)] 98.1 °F (36.7 °C)  Pulse:  [] 77  Resp:  [17-20] 18  SpO2:  [96 %-98 %] 96 %  BP: (125-150)/(60-66) 135/63     Weight: 65.7 kg (144 lb 13.5 oz)  Body mass index is 31.34 kg/m².    Intake/Output Summary (Last 24 hours) at 12/17/2023 1442  Last data filed at 12/17/2023 0810  Gross per 24 hour   Intake 1924.76 ml   Output 800 ml   Net 1124.76 ml           Physical Exam  Vitals and nursing note reviewed.   Constitutional:       Appearance: She is ill-appearing.   HENT:      Head: Normocephalic and atraumatic.      Mouth/Throat:      Mouth: Mucous membranes are moist.   Eyes:      Extraocular Movements: Extraocular movements intact.   Cardiovascular:      Rate and Rhythm: Normal rate and regular rhythm.      Pulses: Normal pulses.      Heart sounds: Normal heart sounds.   Pulmonary:      Effort: Pulmonary effort is normal.      Breath sounds: No wheezing.   Abdominal:      General: There is distension.      Comments: +NG tube to wall suction   Musculoskeletal:         General: Normal range of motion.      Cervical back: Normal range of motion and neck supple.   Skin:     General: Skin is warm.      Capillary Refill: Capillary refill takes 2 to 3 seconds.   Neurological:      Mental Status: She is alert and oriented to person, place, and time.   Psychiatric:         Mood and Affect: Mood normal.         Behavior: Behavior normal.             Significant Labs: All pertinent labs within the past 24 hours have been reviewed.    Significant Imaging: I have reviewed all pertinent imaging results/findings within the past 24 hours.    Assessment/Plan:      * SBO (small bowel obstruction)  - NPO  - NG tube to suction.   - Start LR at 75cc/hr.   - General surgery consulted. Appreciate recommendations.     ---general sx recs:  Assessment & Plan:  88F with presume adhesion partial SBO  NG placed,  continue to suction. Suction needs to be connected to NG tube not the blue sump port, reconnected.  Gastrografin challenge  Would hold eliquis over the weekend in case needs dx lap  NPO for now, will follow up on XRs    GI bleed  Concern for coffee ground emeisis in patient with history of previous upper GI bleed. No active bleeding noted. Hgb normal.     - continue IV protonix.   - NPO.   - Monitor CBC.   - GI consulted. Appreciate recommendations.     GI recs:  Recs:  Protonix IV BID while inpatient  Monitor NG output  Apprecaite surgery consultation     No plans for endoscopy given no continued bleeding and hgb stable    A-fib  - currently NSR.   - Hold AC for now due to concern for GI bleed.     Hypertension  - PRN hydralazine.     Seizure disorder  IM phenobarbital every evening for now until she can resume home PO meds.         VTE Risk Mitigation (From admission, onward)           Ordered     IP VTE HIGH RISK PATIENT  Once         12/15/23 0316     Place sequential compression device  Until discontinued         12/15/23 0316                    Discharge Planning   JESSICA:      Code Status: Full Code   Is the patient medically ready for discharge?:     Reason for patient still in hospital (select all that apply): Laboratory test, Treatment, Imaging, and Consult recommendations  Discharge Plan A: Return to nursing home                  Juventino Melo NP  Department of Hospital Medicine   [facility]

## 2023-12-17 NOTE — CONSULTS
[facility]      Reason for Consult: Sinus tachycardia    SUBJECTIVE:     History of Present Illness:  7yo female with history of pAfib on OAC, May Thruner, DVT, May  sigmoidectomy with end colostomy two years ago complicated by parastomal hernia s/p repair 9 months prior who presents with SBO.  Cardiology consulted Sinus tachycardia and ECG changes. Voiced no CV complaints.      Review of patient's allergies indicates:   Allergen Reactions    Adhesive Itching and Blisters    Penicillins Anaphylaxis    Tramadol Hives    Avelox [moxifloxacin] Rash     Facial and arm itching and redness. Pt states throat closes when given.    Amoxil [amoxicillin]     Aspridrox [aspirin, buffered]     Codeine Other (See Comments)     Throat swelling    Keflex [cephalexin]      Tolerated cefepime and cefazolin    Norvasc [amlodipine]     Red dye Hives    Robitussin [guaifenesin]     Sulfa (sulfonamide antibiotics)     Tylenol [acetaminophen]      Has reaction to Tylenol with red dye and unable to take Extra Strength Tylenol/ CAN ONLY TOLERATE REG STRENGTH TYLENOL    Vicks vaporub [camphor-eucalyptus oil-menthol]        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     Past Surgical History:   Procedure Laterality Date    APPENDECTOMY      BACK SURGERY  1988    vertebral fracture    BACK SURGERY  02/2013    lumbar L2-5    CATARACT EXTRACTION, BILATERAL Bilateral     CHOLECYSTECTOMY      open    ESOPHAGOGASTRODUODENOSCOPY N/A 2/15/2023    Procedure: EGD  (ESOPHAGOGASTRODUODENOSCOPY);  Surgeon: Keith Chavarria MD;  Location: [facility] ENDO;  Service: Endoscopy;  Laterality: N/A;    EYE SURGERY Bilateral     cataract removal with lens implant    HYSTERECTOMY      PERCUTANEOUS TRANSLUMINAL ANGIOPLASTY (PTA) OF PERIPHERAL VESSEL N/A 2/3/2020    Procedure: PTA, PERIPHERAL VESSEL;  Surgeon: Timmy Simmons MD;  Location: [facility] CATH LAB/EP;  Service: Cardiology;  Laterality: N/A;    PORTACATH PLACEMENT Right 09/2016    RENAL ARTERY STENT Left 07/19/2017    ROBOT-ASSISTED LAPAROSCOPIC REPAIR OF VENTRAL HERNIA N/A 3/23/2023    Procedure: ROBOTIC REPAIR, HERNIA, VENTRAL;  Surgeon: Chris Johnson MD;  Location: [facility] OR;  Service: General;  Laterality: N/A;  Robotic Parastomal hernia repair    SIGMOIDECTOMY  10/29/2019    SMALL INTESTINE SURGERY  08/23/2016    THROMBECTOMY N/A 2/3/2020    Procedure: THROMBECTOMY;  Surgeon: Timmy Simmons MD;  Location: [facility] CATH LAB/EP;  Service: Cardiology;  Laterality: N/A;    TONSILLECTOMY      VAGINAL HYSTERECTOMY W/ ANTERIOR AND POSTERIOR VAGINAL REPAIR       Family History   Problem Relation Age of Onset    Pancreatic cancer Mother     Hypertension Father     Heart attack Father      Social History     Tobacco Use    Smoking status: Never    Smokeless tobacco: Never   Substance Use Topics    Alcohol use: No    Drug use: No        Home meds:  No current facility-administered medications on file prior to encounter.     Current Outpatient Medications on File Prior to Encounter   Medication Sig Dispense Refill    acetaminophen (TYLENOL) 325 MG tablet Take 2 tablets (650 mg total) by mouth every 4 (four) hours as needed for Pain.  0    apixaban (ELIQUIS) 2.5 mg Tab Take 1 tablet (2.5 mg total) by mouth 2 (two) times daily.      calcium-vitamin D 600 mg(1,500mg) -400 unit Tab Take 1 tablet by mouth once daily.      docusate sodium (COLACE) 100 MG capsule Take 1 capsule (100 mg total) by mouth 2 (two) times daily.  0    furosemide  (LASIX) 20 MG tablet Take 20 mg by mouth once daily.      [] furosemide (LASIX) 20 MG tablet Take 40 mg by mouth once daily. For 3 days then resume regular dose      levothyroxine (SYNTHROID) 125 MCG tablet TAKE 1 TABLET (125 MCG TOTAL) BY MOUTH ONCE DAILY. 90 tablet 3    losartan (COZAAR) 25 MG tablet Take 25 mg by mouth once daily.      magnesium oxide (MAG-OX) 400 mg (241.3 mg magnesium) tablet Take 2 tablets (800 mg total) by mouth once daily.  0    metoprolol tartrate (LOPRESSOR) 25 MG tablet Take 25 mg by mouth 2 (two) times daily.      ondansetron (ZOFRAN) 4 MG tablet Take 4 mg by mouth every 6 (six) hours as needed for Nausea (vomiting).      OXcarbazepine (TRILEPTAL) 150 MG Tab Take 150 mg by mouth nightly.      pantoprazole (PROTONIX) 40 MG tablet Take 40 mg by mouth once daily.      PHENobarbitaL 97.2 MG tablet Take 97.2 mg by mouth every evening.      polyethylene glycol (GLYCOLAX) 17 gram PwPk Take 17 g by mouth once daily. 30 packet 5    potassium chloride SA (K-DUR,KLOR-CON) 20 MEQ tablet Take 20 mEq by mouth once daily.      pravastatin (PRAVACHOL) 20 MG tablet Take 20 mg by mouth once daily.      vitamin D (VITAMIN D3) 1000 units Tab Take 1,000 Units by mouth once daily.         Current meds:  Scheduled Meds:   furosemide  20 mg Per NG tube Daily    levothyroxine  125 mcg Per NG tube Daily    losartan  25 mg Per NG tube Daily    magnesium sulfate IVPB  2 g Intravenous Once    metoprolol tartrate  25 mg Per NG tube BID    pantoprazole  40 mg Intravenous BID    PHENObarbital  97.5 mg Intramuscular QHS    pravastatin  20 mg Per NG tube Daily     Continuous Infusions:   lactated ringers 75 mL/hr at 23 0755     PRN Meds:.hydrALAZINE, ondansetron      OBJECTIVE:     Vital Signs (Most Recent)  Temp: 98.2 °F (36.8 °C) (23 075)  Pulse: 70 (23 1159)  Resp: 18 (23 075)  BP: (!) 149/66 (23 075)  SpO2: 96 % (23 075)    Vital Signs Range (Last 24H):  Temp:  [97.5 °F  "(36.4 °C)-98.4 °F (36.9 °C)]   Pulse:  []   Resp:  [17-20]   BP: (125-150)/(60-66)   SpO2:  [96 %-98 %]     Physical Exam:  Physical Exam  Constitutional:       General: She is not in acute distress.     Appearance: She is not ill-appearing or toxic-appearing.   Cardiovascular:      Rate and Rhythm: Normal rate and regular rhythm.   Pulmonary:      Effort: Pulmonary effort is normal. No respiratory distress.   Abdominal:      General: There is no distension.      Palpations: Abdomen is soft.      Tenderness: There is no abdominal tenderness. There is no guarding.      Hernia: No hernia is present.      Comments: LLQ ostomy pink and patent without output in bag   Abdomen NTTP   Skin:     Capillary Refill: Capillary refill takes less than 2 seconds.   Neurological:      Mental Status: She is alert. Mental status is at baseline.     Laboratory:  LABS  CBC  Recent Labs   Lab 12/15/23  1630 12/16/23  0657 12/17/23  0415   WBC 6.94 5.59 5.24   RBC 3.99* 3.27* 3.00*   HGB 12.9 10.7* 9.8*   HCT 38.1 32.2* 29.1*    177 150   MCV 96 99* 97   MCH 32.3* 32.7* 32.7*   MCHC 33.9 33.2 33.7     BMP  Recent Labs   Lab 12/16/23  0657 12/16/23  1142 12/17/23  0415    139 139   K 3.2* 3.7 3.6   CO2 32* 32* 30*   CL 98 97 99   BUN 17 18 18   CREATININE 0.8 0.8 0.7   * 114* 97       Recent Labs   Lab 12/15/23  0700 12/16/23  0657 12/16/23  1142 12/17/23  0415   CALCIUM 9.5 9.3 8.9 8.6*   MG 1.8 1.9  --  1.7       LFT  Recent Labs   Lab 12/15/23  0700 12/16/23  0657 12/17/23  0415   PROT 7.3 6.5 5.9*   ALBUMIN 3.7 3.3* 3.2*   BILITOT 0.4 0.5 0.4   AST 23 18 45*   ALKPHOS 93 82 81   ALT 19 17 38       COAGS  Recent Labs   Lab 12/15/23  0146   INR 1.0   APTT 29.1     CE  No results for input(s): "TROPONINI", "CKTOTAL", "CKMB" in the last 168 hours.  BNP  No results for input(s): "BNP" in the last 168 hours.  Lipid panel:  Lab Results   Component Value Date    CHOL 220 (H) 05/09/2017     Lab Results   Component " Value Date    HDL 64 05/09/2017     Lab Results   Component Value Date    LDLCALC 124.8 05/09/2017     Lab Results   Component Value Date    TRIG 156 (H) 05/09/2017     Lab Results   Component Value Date    CHOLHDL 29.1 05/09/2017     Diagnostic Results:  EKG: Initial ECG NSR w PACs  Second: 12/17/23 Afib with RVR with inferolateral STD    Chart review:  Echo: 8/14/20   There is no evidence of intracardiac shunting.  Normal left ventricular systolic function. The estimated ejection fraction is 55%.  Normal LV diastolic function.  The estimated PA systolic pressure is 28 mmHg.  No wall motion abnormalities.  Normal right ventricular systolic function.  Normal central venous pressure (3 mmHg).  Mild mitral regurgitation.       ASSESSMENT/PLAN:     Afib with RVR- Metoprolol 5 mg IVP q6hr PRN for Sustained HR>120s, holding BP parameters. But likely pain/SOB is contributing her elevated HR. Order Echo. Once patient is stable we can do a stress test (lexiscan).  SBO s/p Colostomy- now CT concerning for small bowel obstruction. NG placed to suction.       Syed Luo MD

## 2023-12-17 NOTE — PLAN OF CARE
Pt is AAOx4 with complaints of abd pain with some nausea this am. Pain and nausea relieved by this afternoon. No output seen from colostomy. Green drainage seen from NG tube. NP allowed for patient to have a few ice chips this shift. Cardio consulted and saw this patient for elevated HR. HR now NSR.

## 2023-12-17 NOTE — SUBJECTIVE & OBJECTIVE
Interval History:   Patient was seen and examined.  Had mild nausea when NG tube clamped for medications.  Small amount of stool out colostomy overnight.  Denies abdominal pain.  Voices no other complaints.    Medications:  Continuous Infusions:   lactated ringers 75 mL/hr at 12/17/23 0755     Scheduled Meds:   furosemide  20 mg Per NG tube Daily    levothyroxine  125 mcg Per NG tube Daily    losartan  25 mg Per NG tube Daily    metoprolol tartrate  25 mg Per NG tube BID    pantoprazole  40 mg Intravenous BID    PHENObarbital  97.5 mg Intramuscular QHS    pravastatin  20 mg Per NG tube Daily     PRN Meds:hydrALAZINE, metoprolol, ondansetron     Review of patient's allergies indicates:   Allergen Reactions    Adhesive Itching and Blisters    Penicillins Anaphylaxis    Tramadol Hives    Avelox [moxifloxacin] Rash     Facial and arm itching and redness. Pt states throat closes when given.    Amoxil [amoxicillin]     Aspridrox [aspirin, buffered]     Codeine Other (See Comments)     Throat swelling    Keflex [cephalexin]      Tolerated cefepime and cefazolin    Norvasc [amlodipine]     Red dye Hives    Robitussin [guaifenesin]     Sulfa (sulfonamide antibiotics)     Tylenol [acetaminophen]      Has reaction to Tylenol with red dye and unable to take Extra Strength Tylenol/ CAN ONLY TOLERATE REG STRENGTH TYLENOL    Vicks vaporub [camphor-eucalyptus oil-menthol]      Objective:     Vital Signs (Most Recent):  Temp: 98.1 °F (36.7 °C) (12/17/23 1221)  Pulse: 77 (12/17/23 1221)  Resp: 18 (12/17/23 1221)  BP: 135/63 (12/17/23 1221)  SpO2: 96 % (12/17/23 1221) Vital Signs (24h Range):  Temp:  [97.5 °F (36.4 °C)-98.4 °F (36.9 °C)] 98.1 °F (36.7 °C)  Pulse:  [] 77  Resp:  [17-20] 18  SpO2:  [96 %-98 %] 96 %  BP: (125-150)/(60-66) 135/63     Weight: 65.7 kg (144 lb 13.5 oz)  Body mass index is 31.34 kg/m².    Intake/Output - Last 3 Shifts         12/15 0700 12/16 0659 12/16 0700 12/17 0659 12/17 0700 12/18 0659    P.O.   0     I.V. (mL/kg) 1528.1 (23.9) 808.1 (12.3) 1056.6 (16.1)    NG/GT   60    Total Intake(mL/kg) 1528.1 (23.9) 808.1 (12.3) 1116.6 (17)    Urine (mL/kg/hr) 400 (0.3) 400 (0.3)     Emesis/NG output 0      Drains 1325 400     Stool 0 0     Total Output 1725 800     Net -196.9 +8.1 +1116.6           Urine Occurrence 1 x      Emesis Occurrence 2 x               Physical Exam  Constitutional:       General: She is not in acute distress.     Appearance: She is not ill-appearing or toxic-appearing.   Cardiovascular:      Rate and Rhythm: Normal rate and regular rhythm.   Pulmonary:      Effort: Pulmonary effort is normal. No respiratory distress.   Abdominal:      General: There is no distension.      Palpations: Abdomen is soft.      Tenderness: There is no abdominal tenderness. There is no guarding.      Hernia: No hernia is present.      Comments: Left lower quadrant colostomy pink, patent, and viable with small amount of stool in bag.  No air   Skin:     Capillary Refill: Capillary refill takes less than 2 seconds.   Neurological:      Mental Status: She is alert. Mental status is at baseline.          Significant Labs:  I have reviewed all pertinent lab results within the past 24 hours.  CBC:   Recent Labs   Lab 12/17/23  0415   WBC 5.24   RBC 3.00*   HGB 9.8*   HCT 29.1*      MCV 97   MCH 32.7*   MCHC 33.7     CMP:   Recent Labs   Lab 12/17/23  0415   GLU 97   CALCIUM 8.6*   ALBUMIN 3.2*   PROT 5.9*      K 3.6   CO2 30*   CL 99   BUN 18   CREATININE 0.7   ALKPHOS 81   ALT 38   AST 45*   BILITOT 0.4       Significant Diagnostics:  I have reviewed all pertinent imaging results/findings within the past 24 hours.

## 2023-12-17 NOTE — SUBJECTIVE & OBJECTIVE
Interval History:  Seen at the bedside.  No distress noted.  Appreciate General surgery.  Continue low wall suction. Because of concerns of poor absorption will change  p.o. medications to IV forms.    Review of Systems   Constitutional:  Negative for fatigue and fever.   HENT:  Negative for trouble swallowing.    Respiratory:  Negative for shortness of breath.    Cardiovascular:  Negative for chest pain.   Gastrointestinal:  Positive for abdominal distention and vomiting.   Genitourinary:  Negative for difficulty urinating and hematuria.   Psychiatric/Behavioral:  Negative for confusion.      Objective:     Vital Signs (Most Recent):  Temp: 98.1 °F (36.7 °C) (12/17/23 1221)  Pulse: 77 (12/17/23 1221)  Resp: 18 (12/17/23 1221)  BP: 135/63 (12/17/23 1221)  SpO2: 96 % (12/17/23 1221) Vital Signs (24h Range):  Temp:  [97.5 °F (36.4 °C)-98.4 °F (36.9 °C)] 98.1 °F (36.7 °C)  Pulse:  [] 77  Resp:  [17-20] 18  SpO2:  [96 %-98 %] 96 %  BP: (125-150)/(60-66) 135/63     Weight: 65.7 kg (144 lb 13.5 oz)  Body mass index is 31.34 kg/m².    Intake/Output Summary (Last 24 hours) at 12/17/2023 1442  Last data filed at 12/17/2023 0810  Gross per 24 hour   Intake 1924.76 ml   Output 800 ml   Net 1124.76 ml           Physical Exam  Vitals and nursing note reviewed.   Constitutional:       Appearance: She is ill-appearing.   HENT:      Head: Normocephalic and atraumatic.      Mouth/Throat:      Mouth: Mucous membranes are moist.   Eyes:      Extraocular Movements: Extraocular movements intact.   Cardiovascular:      Rate and Rhythm: Normal rate and regular rhythm.      Pulses: Normal pulses.      Heart sounds: Normal heart sounds.   Pulmonary:      Effort: Pulmonary effort is normal.      Breath sounds: No wheezing.   Abdominal:      General: There is distension.      Comments: +NG tube to wall suction   Musculoskeletal:         General: Normal range of motion.      Cervical back: Normal range of motion and neck supple.    Skin:     General: Skin is warm.      Capillary Refill: Capillary refill takes 2 to 3 seconds.   Neurological:      Mental Status: She is alert and oriented to person, place, and time.   Psychiatric:         Mood and Affect: Mood normal.         Behavior: Behavior normal.             Significant Labs: All pertinent labs within the past 24 hours have been reviewed.    Significant Imaging: I have reviewed all pertinent imaging results/findings within the past 24 hours.

## 2023-12-17 NOTE — ASSESSMENT & PLAN NOTE
KUB yesterday demonstrating no residual contrast unsure if contrast passed or was NG tube   Patient with decreased NG tube output 400 cc yesterday with output more clear than yesterday  Patient is slowly progressing with no acute surgical intervention planned for today   Out of bed-to-chair   Recommend switching p.o. medications to IV were placements due to slowly progressing obstruction  Continue NG tube to low intermittent wall suction   Rest of care per primary

## 2023-12-18 LAB
ALBUMIN SERPL BCP-MCNC: 3.1 G/DL (ref 3.5–5.2)
ALP SERPL-CCNC: 75 U/L (ref 55–135)
ALT SERPL W/O P-5'-P-CCNC: 38 U/L (ref 10–44)
ANION GAP SERPL CALC-SCNC: 13 MMOL/L (ref 8–16)
ASCENDING AORTA: 3.02 CM
AST SERPL-CCNC: 36 U/L (ref 10–40)
AV INDEX (PROSTH): 0.48
AV MEAN GRADIENT: 5 MMHG
AV PEAK GRADIENT: 10 MMHG
AV VALVE AREA BY VELOCITY RATIO: 1.92 CM²
AV VALVE AREA: 1.49 CM²
AV VELOCITY RATIO: 0.61
BASOPHILS # BLD AUTO: 0.03 K/UL (ref 0–0.2)
BASOPHILS NFR BLD: 0.5 % (ref 0–1.9)
BILIRUB SERPL-MCNC: 0.4 MG/DL (ref 0.1–1)
BSA FOR ECHO PROCEDURE: 1.62 M2
BUN SERPL-MCNC: 17 MG/DL (ref 8–23)
CALCIUM SERPL-MCNC: 8.3 MG/DL (ref 8.7–10.5)
CHLORIDE SERPL-SCNC: 98 MMOL/L (ref 95–110)
CO2 SERPL-SCNC: 26 MMOL/L (ref 23–29)
CREAT SERPL-MCNC: 0.7 MG/DL (ref 0.5–1.4)
CV ECHO LV RWT: 0.53 CM
DIFFERENTIAL METHOD: ABNORMAL
DOP CALC AO PEAK VEL: 1.59 M/S
DOP CALC AO VTI: 38.5 CM
DOP CALC LVOT AREA: 3.1 CM2
DOP CALC LVOT DIAMETER: 2 CM
DOP CALC LVOT PEAK VEL: 0.97 M/S
DOP CALC LVOT STROKE VOLUME: 57.46 CM3
DOP CALC MV VTI: 34.2 CM
DOP CALCLVOT PEAK VEL VTI: 18.3 CM
E WAVE DECELERATION TIME: 267.18 MSEC
E/A RATIO: 1.07
E/E' RATIO: 13.14 M/S
ECHO LV POSTERIOR WALL: 1.07 CM (ref 0.6–1.1)
EOSINOPHIL # BLD AUTO: 0.2 K/UL (ref 0–0.5)
EOSINOPHIL NFR BLD: 2.8 % (ref 0–8)
ERYTHROCYTE [DISTWIDTH] IN BLOOD BY AUTOMATED COUNT: 13.6 % (ref 11.5–14.5)
EST. GFR  (NO RACE VARIABLE): >60 ML/MIN/1.73 M^2
FRACTIONAL SHORTENING: 33 % (ref 28–44)
GLUCOSE SERPL-MCNC: 71 MG/DL (ref 70–110)
HCT VFR BLD AUTO: 28.6 % (ref 37–48.5)
HGB BLD-MCNC: 9.6 G/DL (ref 12–16)
IMM GRANULOCYTES # BLD AUTO: 0.02 K/UL (ref 0–0.04)
IMM GRANULOCYTES NFR BLD AUTO: 0.3 % (ref 0–0.5)
INTERVENTRICULAR SEPTUM: 1.03 CM (ref 0.6–1.1)
IVC DIAMETER: 1.71 CM
LA MAJOR: 4.98 CM
LA MINOR: 3.82 CM
LA WIDTH: 3.1 CM
LEFT ATRIUM SIZE: 3.29 CM
LEFT ATRIUM VOLUME INDEX MOD: 21.6 ML/M2
LEFT ATRIUM VOLUME INDEX: 23.7 ML/M2
LEFT ATRIUM VOLUME MOD: 34.19 CM3
LEFT ATRIUM VOLUME: 37.48 CM3
LEFT INTERNAL DIMENSION IN SYSTOLE: 2.72 CM (ref 2.1–4)
LEFT VENTRICLE DIASTOLIC VOLUME INDEX: 45.46 ML/M2
LEFT VENTRICLE DIASTOLIC VOLUME: 71.82 ML
LEFT VENTRICLE MASS INDEX: 88 G/M2
LEFT VENTRICLE SYSTOLIC VOLUME INDEX: 17.3 ML/M2
LEFT VENTRICLE SYSTOLIC VOLUME: 27.41 ML
LEFT VENTRICULAR INTERNAL DIMENSION IN DIASTOLE: 4.04 CM (ref 3.5–6)
LEFT VENTRICULAR MASS: 138.33 G
LV LATERAL E/E' RATIO: 13.14 M/S
LV SEPTAL E/E' RATIO: 13.14 M/S
LVOT MG: 1.95 MMHG
LVOT MV: 0.65 CM/S
LYMPHOCYTES # BLD AUTO: 1.2 K/UL (ref 1–4.8)
LYMPHOCYTES NFR BLD: 20.5 % (ref 18–48)
MAGNESIUM SERPL-MCNC: 1.9 MG/DL (ref 1.6–2.6)
MCH RBC QN AUTO: 32.1 PG (ref 27–31)
MCHC RBC AUTO-ENTMCNC: 33.6 G/DL (ref 32–36)
MCV RBC AUTO: 96 FL (ref 82–98)
MONOCYTES # BLD AUTO: 0.5 K/UL (ref 0.3–1)
MONOCYTES NFR BLD: 8.3 % (ref 4–15)
MV MEAN GRADIENT: 2 MMHG
MV PEAK A VEL: 0.86 M/S
MV PEAK E VEL: 0.92 M/S
MV PEAK GRADIENT: 6 MMHG
MV VALVE AREA BY CONTINUITY EQUATION: 1.68 CM2
NEUTROPHILS # BLD AUTO: 4.1 K/UL (ref 1.8–7.7)
NEUTROPHILS NFR BLD: 67.6 % (ref 38–73)
NRBC BLD-RTO: 0 /100 WBC
OHS LV EJECTION FRACTION SIMPSONS BIPLANE MOD: 65 %
PISA MRMAX VEL: 5.27 M/S
PLATELET # BLD AUTO: 141 K/UL (ref 150–450)
PMV BLD AUTO: 10.1 FL (ref 9.2–12.9)
POTASSIUM SERPL-SCNC: 3.7 MMOL/L (ref 3.5–5.1)
PROT SERPL-MCNC: 5.8 G/DL (ref 6–8.4)
RA MAJOR: 4.05 CM
RA PRESSURE ESTIMATED: 3 MMHG
RA WIDTH: 3.39 CM
RBC # BLD AUTO: 2.99 M/UL (ref 4–5.4)
RIGHT VENTRICULAR END-DIASTOLIC DIMENSION: 1.81 CM
RV TISSUE DOPPLER FREE WALL SYSTOLIC VELOCITY 1 (APICAL 4 CHAMBER VIEW): 17.91 CM/S
SINUS: 3.68 CM
SODIUM SERPL-SCNC: 137 MMOL/L (ref 136–145)
STJ: 2.78 CM
TDI LATERAL: 0.07 M/S
TDI SEPTAL: 0.07 M/S
TDI: 0.07 M/S
TRICUSPID ANNULAR PLANE SYSTOLIC EXCURSION: 1.8 CM
WBC # BLD AUTO: 6.01 K/UL (ref 3.9–12.7)
Z-SCORE OF LEFT VENTRICULAR DIMENSION IN END DIASTOLE: -1.13
Z-SCORE OF LEFT VENTRICULAR DIMENSION IN END SYSTOLE: -0.25

## 2023-12-18 PROCEDURE — 99232 SBSQ HOSP IP/OBS MODERATE 35: CPT | Mod: ,,, | Performed by: STUDENT IN AN ORGANIZED HEALTH CARE EDUCATION/TRAINING PROGRAM

## 2023-12-18 PROCEDURE — 85025 COMPLETE CBC W/AUTO DIFF WBC: CPT | Performed by: STUDENT IN AN ORGANIZED HEALTH CARE EDUCATION/TRAINING PROGRAM

## 2023-12-18 PROCEDURE — 80053 COMPREHEN METABOLIC PANEL: CPT | Performed by: STUDENT IN AN ORGANIZED HEALTH CARE EDUCATION/TRAINING PROGRAM

## 2023-12-18 PROCEDURE — 21400001 HC TELEMETRY ROOM

## 2023-12-18 PROCEDURE — C9113 INJ PANTOPRAZOLE SODIUM, VIA: HCPCS | Performed by: STUDENT IN AN ORGANIZED HEALTH CARE EDUCATION/TRAINING PROGRAM

## 2023-12-18 PROCEDURE — 83735 ASSAY OF MAGNESIUM: CPT | Performed by: STUDENT IN AN ORGANIZED HEALTH CARE EDUCATION/TRAINING PROGRAM

## 2023-12-18 PROCEDURE — 94761 N-INVAS EAR/PLS OXIMETRY MLT: CPT

## 2023-12-18 PROCEDURE — 63600175 PHARM REV CODE 636 W HCPCS: Performed by: STUDENT IN AN ORGANIZED HEALTH CARE EDUCATION/TRAINING PROGRAM

## 2023-12-18 PROCEDURE — 27000221 HC OXYGEN, UP TO 24 HOURS

## 2023-12-18 PROCEDURE — 99232 PR SUBSEQUENT HOSPITAL CARE,LEVL II: ICD-10-PCS | Mod: ,,, | Performed by: STUDENT IN AN ORGANIZED HEALTH CARE EDUCATION/TRAINING PROGRAM

## 2023-12-18 PROCEDURE — 25000003 PHARM REV CODE 250: Performed by: STUDENT IN AN ORGANIZED HEALTH CARE EDUCATION/TRAINING PROGRAM

## 2023-12-18 PROCEDURE — 63600175 PHARM REV CODE 636 W HCPCS: Performed by: NURSE PRACTITIONER

## 2023-12-18 PROCEDURE — 99900035 HC TECH TIME PER 15 MIN (STAT)

## 2023-12-18 RX ORDER — PHENOBARBITAL 32.4 MG/1
97.2 TABLET ORAL NIGHTLY
Status: DISCONTINUED | OUTPATIENT
Start: 2023-12-18 | End: 2023-12-19 | Stop reason: HOSPADM

## 2023-12-18 RX ORDER — PHENOBARBITAL SODIUM 65 MG/ML
97.2 INJECTION, SOLUTION INTRAMUSCULAR; INTRAVENOUS NIGHTLY
Status: DISCONTINUED | OUTPATIENT
Start: 2023-12-18 | End: 2023-12-18

## 2023-12-18 RX ADMIN — PANTOPRAZOLE SODIUM 40 MG: 40 INJECTION, POWDER, LYOPHILIZED, FOR SOLUTION INTRAVENOUS at 08:12

## 2023-12-18 RX ADMIN — PHENOBARBITAL 97.2 MG: 32.4 TABLET ORAL at 08:12

## 2023-12-18 RX ADMIN — FUROSEMIDE 20 MG: 10 INJECTION, SOLUTION INTRAMUSCULAR; INTRAVENOUS at 09:12

## 2023-12-18 RX ADMIN — PANTOPRAZOLE SODIUM 40 MG: 40 INJECTION, POWDER, LYOPHILIZED, FOR SOLUTION INTRAVENOUS at 09:12

## 2023-12-18 RX ADMIN — SODIUM CHLORIDE, POTASSIUM CHLORIDE, SODIUM LACTATE AND CALCIUM CHLORIDE: 600; 310; 30; 20 INJECTION, SOLUTION INTRAVENOUS at 11:12

## 2023-12-18 NOTE — SUBJECTIVE & OBJECTIVE
Interval History: No acute events overnight. HDS on 0.5L. Denies pain. Says that she feels much better. NG with 350 cc out, non bilious. Ostomy with no recorded output and no stool or gas in bag this morning.     Medications:  Continuous Infusions:   lactated ringers 75 mL/hr at 12/17/23 2153     Scheduled Meds:   furosemide (LASIX) injection  20 mg Intravenous Daily    pantoprazole  40 mg Intravenous BID    PHENObarbital  97.5 mg Intramuscular QHS     PRN Meds:hydrALAZINE, metoprolol, ondansetron     Review of patient's allergies indicates:   Allergen Reactions    Adhesive Itching and Blisters    Penicillins Anaphylaxis    Tramadol Hives    Avelox [moxifloxacin] Rash     Facial and arm itching and redness. Pt states throat closes when given.    Amoxil [amoxicillin]     Aspridrox [aspirin, buffered]     Codeine Other (See Comments)     Throat swelling    Keflex [cephalexin]      Tolerated cefepime and cefazolin    Norvasc [amlodipine]     Red dye Hives    Robitussin [guaifenesin]     Sulfa (sulfonamide antibiotics)     Tylenol [acetaminophen]      Has reaction to Tylenol with red dye and unable to take Extra Strength Tylenol/ CAN ONLY TOLERATE REG STRENGTH TYLENOL    Vicks vaporub [camphor-eucalyptus oil-menthol]      Objective:     Vital Signs (Most Recent):  Temp: 97.8 °F (36.6 °C) (12/18/23 0800)  Pulse: (!) 56 (12/18/23 0800)  Resp: 20 (12/18/23 0800)  BP: (!) 140/60 (12/18/23 0800)  SpO2: 97 % (12/18/23 0800) Vital Signs (24h Range):  Temp:  [97.8 °F (36.6 °C)-98.4 °F (36.9 °C)] 97.8 °F (36.6 °C)  Pulse:  [] 56  Resp:  [17-20] 20  SpO2:  [79 %-98 %] 97 %  BP: (131-161)/(60-68) 140/60     Weight: 66.4 kg (146 lb 6.2 oz)  Body mass index is 31.68 kg/m².    Intake/Output - Last 3 Shifts         12/16 0700 12/17 0659 12/17 0700 12/18 0659 12/18 0700  12/19 0659    P.O. 0      I.V. (mL/kg) 808.1 (12.3) 1705.1 (25.7)     NG/GT  60     Total Intake(mL/kg) 808.1 (12.3) 1765.1 (26.6)     Urine (mL/kg/hr) 400  (0.3) 500 (0.3)     Emesis/NG output       Drains 400 350     Stool 0 0     Total Output 800 850     Net +8.1 +915.1                     Physical Exam  Vitals and nursing note reviewed.   Constitutional:       General: She is not in acute distress.  HENT:      Head: Normocephalic and atraumatic.      Mouth/Throat:      Mouth: Mucous membranes are moist.   Cardiovascular:      Rate and Rhythm: Normal rate.   Pulmonary:      Effort: Pulmonary effort is normal. No respiratory distress.   Abdominal:      General: There is no distension.      Palpations: Abdomen is soft.      Tenderness: There is no abdominal tenderness.      Comments: Colostomy in place with no stool or gas in bag   Skin:     General: Skin is warm and dry.   Neurological:      General: No focal deficit present.      Mental Status: She is alert and oriented to person, place, and time.          Significant Labs:  I have reviewed all pertinent lab results within the past 24 hours.  CBC:   Recent Labs   Lab 12/18/23  0430   WBC 6.01   RBC 2.99*   HGB 9.6*   HCT 28.6*   *   MCV 96   MCH 32.1*   MCHC 33.6     CMP:   Recent Labs   Lab 12/18/23  0430   GLU 71   CALCIUM 8.3*   ALBUMIN 3.1*   PROT 5.8*      K 3.7   CO2 26   CL 98   BUN 17   CREATININE 0.7   ALKPHOS 75   ALT 38   AST 36   BILITOT 0.4       Significant Diagnostics:  I have reviewed all pertinent imaging results/findings within the past 24 hours.

## 2023-12-18 NOTE — ASSESSMENT & PLAN NOTE
- NPO  - NG tube to suction.   - Start LR at 75cc/hr.   - General surgery consulted. Appreciate recommendations.     ---general sx recs:  Assessment & Plan:  88F with presume adhesion partial SBO  NG placed, continue to suction. Suction needs to be connected to NG tube not the blue sump port, reconnected.  Gastrografin challenge  Would hold eliquis over the weekend in case needs dx lap    KUB  demonstrating no residual contrast unsure if contrast passed or was NG tube   NG with low output, however ostomy without any output as well.   Clamp NG tube  Will trial clears, however if she does not progress she will need operative exploration  Out of bed-to-chair   Recommend switching p.o. medications to IV were placements due to slowly progressing obstruction

## 2023-12-18 NOTE — ASSESSMENT & PLAN NOTE
88 year old female with SBO    KUB  demonstrating no residual contrast unsure if contrast passed or was NG tube   NG with low output, however ostomy without any output as well.   Clamp NG tube  Will trial clears, however if she does not progress she will need operative exploration  Out of bed-to-chair   Recommend switching p.o. medications to IV were placements due to slowly progressing obstruction  Rest of care per primary

## 2023-12-18 NOTE — PLAN OF CARE
Pt not medically ready for discharge back to NH today. CM will continue to follow pt throughout this hospitalization and provide any discharge needs.    12/18/23 1610   Post-Acute Status   Discharge Delays None known at this time   Discharge Plan   Discharge Plan A Return to nursing home

## 2023-12-18 NOTE — PROGRESS NOTES
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     Post-Op Info:  * No surgery found *         Interval History: No acute events overnight. HDS on 0.5L. Denies pain. Says that she feels much better. NG with 350 cc out, non bilious. Ostomy with no recorded output and no stool or gas in bag this morning.     Medications:  Continuous Infusions:   lactated ringers 75 mL/hr at 12/17/23 2153     Scheduled Meds:   furosemide (LASIX) injection  20 mg Intravenous Daily    pantoprazole  40 mg Intravenous BID    PHENObarbital  97.5 mg Intramuscular QHS     PRN Meds:hydrALAZINE, metoprolol, ondansetron     Review of patient's allergies indicates:   Allergen Reactions    Adhesive Itching and Blisters    Penicillins Anaphylaxis    Tramadol Hives    Avelox [moxifloxacin] Rash     Facial and arm itching and redness. Pt states throat closes when given.    Amoxil [amoxicillin]     Aspridrox [aspirin, buffered]     Codeine Other (See Comments)     Throat swelling    Keflex [cephalexin]      Tolerated cefepime and cefazolin    Norvasc [amlodipine]     Red dye Hives    Robitussin [guaifenesin]     Sulfa (sulfonamide antibiotics)     Tylenol [acetaminophen]      Has reaction to Tylenol with red dye and unable to take Extra Strength Tylenol/ CAN ONLY TOLERATE REG STRENGTH TYLENOL    Vicks vaporub [camphor-eucalyptus oil-menthol]      Objective:     Vital Signs (Most Recent):  Temp: 97.8 °F (36.6 °C) (12/18/23 0800)  Pulse: (!) 56 (12/18/23 0800)  Resp: 20 (12/18/23 0800)  BP: (!) 140/60 (12/18/23 0800)  SpO2: 97 % (12/18/23 0800) Vital Signs (24h Range):  Temp:  [97.8 °F (36.6 °C)-98.4 °F (36.9 °C)] 97.8 °F (36.6 °C)  Pulse:  [] 56  Resp:  [17-20] 20  SpO2:  [79 %-98 %] 97 %  BP: (131-161)/(60-68) 140/60     Weight: 66.4 kg (146  lb 6.2 oz)  Body mass index is 31.68 kg/m².    Intake/Output - Last 3 Shifts         12/16 0700 12/17 0659 12/17 0700 12/18 0659 12/18 0700 12/19 0659    P.O. 0      I.V. (mL/kg) 808.1 (12.3) 1705.1 (25.7)     NG/GT  60     Total Intake(mL/kg) 808.1 (12.3) 1765.1 (26.6)     Urine (mL/kg/hr) 400 (0.3) 500 (0.3)     Emesis/NG output       Drains 400 350     Stool 0 0     Total Output 800 850     Net +8.1 +915.1                     Physical Exam  Vitals and nursing note reviewed.   Constitutional:       General: She is not in acute distress.  HENT:      Head: Normocephalic and atraumatic.      Mouth/Throat:      Mouth: Mucous membranes are moist.   Cardiovascular:      Rate and Rhythm: Normal rate.   Pulmonary:      Effort: Pulmonary effort is normal. No respiratory distress.   Abdominal:      General: There is no distension.      Palpations: Abdomen is soft.      Tenderness: There is no abdominal tenderness.      Comments: Colostomy in place with no stool or gas in bag   Skin:     General: Skin is warm and dry.   Neurological:      General: No focal deficit present.      Mental Status: She is alert and oriented to person, place, and time.          Significant Labs:  I have reviewed all pertinent lab results within the past 24 hours.  CBC:   Recent Labs   Lab 12/18/23  0430   WBC 6.01   RBC 2.99*   HGB 9.6*   HCT 28.6*   *   MCV 96   MCH 32.1*   MCHC 33.6     CMP:   Recent Labs   Lab 12/18/23  0430   GLU 71   CALCIUM 8.3*   ALBUMIN 3.1*   PROT 5.8*      K 3.7   CO2 26   CL 98   BUN 17   CREATININE 0.7   ALKPHOS 75   ALT 38   AST 36   BILITOT 0.4       Significant Diagnostics:  I have reviewed all pertinent imaging results/findings within the past 24 hours.  Assessment/Plan:     * SBO (small bowel obstruction)  88 year old female with SBO    KUB  demonstrating no residual contrast unsure if contrast passed or was NG tube   NG with low output, however ostomy without any output as well.   Clamp NG  tube  Will trial clears, however if she does not progress she will need operative exploration  Out of bed-to-chair   Recommend switching p.o. medications to IV were placements due to slowly progressing obstruction  Rest of care per primary    Hypertension  Mild HTN - 170s/70  Recommend PRN Hydralazine for SBP > 160s due to limited absorption of PO medications        Ruby Lundberg MD  General Surgery  [place] [facility]

## 2023-12-18 NOTE — SUBJECTIVE & OBJECTIVE
Interval History:  Seen at the bedside.  No distress noted.  Appreciate General surgery.  NG tube is clamped now.  Will continue clear liquid diet..    Review of Systems   Constitutional:  Negative for fatigue and fever.   HENT:  Negative for trouble swallowing.    Respiratory:  Negative for shortness of breath.    Cardiovascular:  Negative for chest pain.   Gastrointestinal:  Positive for abdominal distention. Negative for nausea and vomiting.   Genitourinary:  Negative for difficulty urinating and hematuria.   Psychiatric/Behavioral:  Negative for confusion.      Objective:     Vital Signs (Most Recent):  Temp: 98.2 °F (36.8 °C) (12/18/23 1111)  Pulse: 60 (12/18/23 1111)  Resp: 20 (12/18/23 1111)  BP: (!) 120/57 (12/18/23 1111)  SpO2: 96 % (12/18/23 1111) Vital Signs (24h Range):  Temp:  [97.8 °F (36.6 °C)-98.4 °F (36.9 °C)] 98.2 °F (36.8 °C)  Pulse:  [56-71] 60  Resp:  [17-20] 20  SpO2:  [79 %-98 %] 96 %  BP: (120-161)/(57-68) 120/57     Weight: 66.4 kg (146 lb 6.2 oz)  Body mass index is 31.68 kg/m².    Intake/Output Summary (Last 24 hours) at 12/18/2023 1355  Last data filed at 12/18/2023 1317  Gross per 24 hour   Intake 1128.47 ml   Output 3800 ml   Net -2671.53 ml           Physical Exam  Vitals and nursing note reviewed.   Constitutional:       Appearance: She is ill-appearing.   HENT:      Head: Normocephalic and atraumatic.      Mouth/Throat:      Mouth: Mucous membranes are moist.   Eyes:      Extraocular Movements: Extraocular movements intact.   Cardiovascular:      Rate and Rhythm: Normal rate and regular rhythm.      Pulses: Normal pulses.      Heart sounds: Normal heart sounds.   Pulmonary:      Effort: Pulmonary effort is normal.      Breath sounds: No wheezing.   Abdominal:      General: There is distension.      Comments: +NG tube to wall suction   Musculoskeletal:         General: Normal range of motion.      Cervical back: Normal range of motion and neck supple.   Skin:     General: Skin is  warm.      Capillary Refill: Capillary refill takes 2 to 3 seconds.   Neurological:      Mental Status: She is alert and oriented to person, place, and time.   Psychiatric:         Mood and Affect: Mood normal.         Behavior: Behavior normal.             Significant Labs: All pertinent labs within the past 24 hours have been reviewed.    Significant Imaging: I have reviewed all pertinent imaging results/findings within the past 24 hours.

## 2023-12-18 NOTE — PROGRESS NOTES
[facility] Medicine  Progress Note    Patient Name: Annette Garcia  MRN: [id number]  Patient Class: IP- Inpatient   Admission Date: 12/15/2023  Length of Stay: 3 days  Attending Physician: Clarice Holloway MD  Primary Care Provider: Jose Valles MD        Subjective:     Principal Problem:SBO (small bowel obstruction)        HPI:  Ms. Annette Garcia is a 89 y/o White F w/ PMH of HTN, seizure disorder, afib on eliquis, LLE DVD, May-Thurner Syndrome, SBO s/p colostomy, and osteoporosis who presents from [facility] nursing [facility] w/ 2 day history of multiple episodes of vomiting and abdominal pain. States she did not notice any blood, however as per EMS they noticed coffee ground emisis. Denied any fever, chills, SOB, chest pain.     In the ED, cbc and bmp unremarkable. CTA abdomen concerning for SBO. ED staff spoke w/ General surgery and it was decided to place NG tube and put it on suction.     Overview/Hospital Course:  She was admitted to the hospital medicine service for further care.  CTA of the abdomen was concerning for small bowel obstruction.  General surgery was consulted.  NG tube was placed to suction--now clamped.  She was started on clear liquid diet per General surgery recommendations.  We will continue to monitor and replace electrolytes.    Interval History:  Seen at the bedside.  No distress noted.  Appreciate General surgery.  NG tube is clamped now.  Will continue clear liquid diet..    Review of Systems   Constitutional:  Negative for fatigue and fever.   HENT:  Negative for trouble swallowing.    Respiratory:  Negative for shortness of breath.    Cardiovascular:  Negative for chest pain.   Gastrointestinal:  Positive for abdominal distention. Negative for nausea and vomiting.   Genitourinary:  Negative for difficulty urinating and hematuria.   Psychiatric/Behavioral:  Negative for confusion.      Objective:     Vital Signs (Most Recent):  Temp: 98.2 °F (36.8 °C) (12/18/23  1111)  Pulse: 60 (12/18/23 1111)  Resp: 20 (12/18/23 1111)  BP: (!) 120/57 (12/18/23 1111)  SpO2: 96 % (12/18/23 1111) Vital Signs (24h Range):  Temp:  [97.8 °F (36.6 °C)-98.4 °F (36.9 °C)] 98.2 °F (36.8 °C)  Pulse:  [56-71] 60  Resp:  [17-20] 20  SpO2:  [79 %-98 %] 96 %  BP: (120-161)/(57-68) 120/57     Weight: 66.4 kg (146 lb 6.2 oz)  Body mass index is 31.68 kg/m².    Intake/Output Summary (Last 24 hours) at 12/18/2023 1355  Last data filed at 12/18/2023 1317  Gross per 24 hour   Intake 1128.47 ml   Output 3800 ml   Net -2671.53 ml           Physical Exam  Vitals and nursing note reviewed.   Constitutional:       Appearance: She is ill-appearing.   HENT:      Head: Normocephalic and atraumatic.      Mouth/Throat:      Mouth: Mucous membranes are moist.   Eyes:      Extraocular Movements: Extraocular movements intact.   Cardiovascular:      Rate and Rhythm: Normal rate and regular rhythm.      Pulses: Normal pulses.      Heart sounds: Normal heart sounds.   Pulmonary:      Effort: Pulmonary effort is normal.      Breath sounds: No wheezing.   Abdominal:      General: There is distension.      Comments: +NG tube to wall suction   Musculoskeletal:         General: Normal range of motion.      Cervical back: Normal range of motion and neck supple.   Skin:     General: Skin is warm.      Capillary Refill: Capillary refill takes 2 to 3 seconds.   Neurological:      Mental Status: She is alert and oriented to person, place, and time.   Psychiatric:         Mood and Affect: Mood normal.         Behavior: Behavior normal.             Significant Labs: All pertinent labs within the past 24 hours have been reviewed.    Significant Imaging: I have reviewed all pertinent imaging results/findings within the past 24 hours.    Assessment/Plan:      * SBO (small bowel obstruction)  - NPO  - NG tube to suction.   - Start LR at 75cc/hr.   - General surgery consulted. Appreciate recommendations.     ---general sx recs:  Assessment &  Plan:  88F with presume adhesion partial SBO  NG placed, continue to suction. Suction needs to be connected to NG tube not the blue sump port, reconnected.  Gastrografin challenge  Would hold eliquis over the weekend in case needs dx lap    KUB  demonstrating no residual contrast unsure if contrast passed or was NG tube   NG with low output, however ostomy without any output as well.   Clamp NG tube  Will trial clears, however if she does not progress she will need operative exploration  Out of bed-to-chair   Recommend switching p.o. medications to IV were placements due to slowly progressing obstruction      GI bleed  Concern for coffee ground emeisis in patient with history of previous upper GI bleed. No active bleeding noted. Hgb normal.     - continue IV protonix.   - NPO.   - Monitor CBC.   - GI consulted. Appreciate recommendations.     GI recs:  Recs:  Protonix IV BID while inpatient  Monitor NG output  Apprecaite surgery consultation     No plans for endoscopy given no continued bleeding and hgb stable    A-fib  - currently NSR.   - Hold AC for now     Hypertension  - PRN hydralazine.     Seizure disorder  IM phenobarbital every evening for now until she can resume home PO meds.         VTE Risk Mitigation (From admission, onward)           Ordered     IP VTE HIGH RISK PATIENT  Once         12/15/23 0316     Place sequential compression device  Until discontinued         12/15/23 0316                    Discharge Planning   JESSICA:      Code Status: Full Code   Is the patient medically ready for discharge?:     Reason for patient still in hospital (select all that apply): Laboratory test, Treatment, Imaging, and Consult recommendations  Discharge Plan A: Return to nursing home                  Juventino Melo NP  Department of Hospital Medicine   [facility] Surg

## 2023-12-19 VITALS
SYSTOLIC BLOOD PRESSURE: 139 MMHG | BODY MASS INDEX: 31.96 KG/M2 | RESPIRATION RATE: 20 BRPM | OXYGEN SATURATION: 93 % | TEMPERATURE: 98 F | WEIGHT: 148.13 LBS | DIASTOLIC BLOOD PRESSURE: 60 MMHG | HEIGHT: 57 IN | HEART RATE: 79 BPM

## 2023-12-19 LAB
ALBUMIN SERPL BCP-MCNC: 3.1 G/DL (ref 3.5–5.2)
ALP SERPL-CCNC: 82 U/L (ref 55–135)
ALT SERPL W/O P-5'-P-CCNC: 30 U/L (ref 10–44)
ANION GAP SERPL CALC-SCNC: 11 MMOL/L (ref 8–16)
AST SERPL-CCNC: 26 U/L (ref 10–40)
BASOPHILS # BLD AUTO: 0.03 K/UL (ref 0–0.2)
BASOPHILS NFR BLD: 0.5 % (ref 0–1.9)
BILIRUB SERPL-MCNC: 0.4 MG/DL (ref 0.1–1)
BUN SERPL-MCNC: 13 MG/DL (ref 8–23)
CALCIUM SERPL-MCNC: 8.6 MG/DL (ref 8.7–10.5)
CHLORIDE SERPL-SCNC: 95 MMOL/L (ref 95–110)
CO2 SERPL-SCNC: 27 MMOL/L (ref 23–29)
CREAT SERPL-MCNC: 0.7 MG/DL (ref 0.5–1.4)
DIFFERENTIAL METHOD: ABNORMAL
EOSINOPHIL # BLD AUTO: 0.2 K/UL (ref 0–0.5)
EOSINOPHIL NFR BLD: 3.8 % (ref 0–8)
ERYTHROCYTE [DISTWIDTH] IN BLOOD BY AUTOMATED COUNT: 13.2 % (ref 11.5–14.5)
EST. GFR  (NO RACE VARIABLE): >60 ML/MIN/1.73 M^2
GLUCOSE SERPL-MCNC: 88 MG/DL (ref 70–110)
HCT VFR BLD AUTO: 32.3 % (ref 37–48.5)
HGB BLD-MCNC: 10.8 G/DL (ref 12–16)
IMM GRANULOCYTES # BLD AUTO: 0.01 K/UL (ref 0–0.04)
IMM GRANULOCYTES NFR BLD AUTO: 0.2 % (ref 0–0.5)
LYMPHOCYTES # BLD AUTO: 1.4 K/UL (ref 1–4.8)
LYMPHOCYTES NFR BLD: 22.3 % (ref 18–48)
MAGNESIUM SERPL-MCNC: 1.6 MG/DL (ref 1.6–2.6)
MCH RBC QN AUTO: 32 PG (ref 27–31)
MCHC RBC AUTO-ENTMCNC: 33.4 G/DL (ref 32–36)
MCV RBC AUTO: 96 FL (ref 82–98)
MONOCYTES # BLD AUTO: 0.5 K/UL (ref 0.3–1)
MONOCYTES NFR BLD: 7.8 % (ref 4–15)
NEUTROPHILS # BLD AUTO: 4 K/UL (ref 1.8–7.7)
NEUTROPHILS NFR BLD: 65.4 % (ref 38–73)
NRBC BLD-RTO: 0 /100 WBC
PLATELET # BLD AUTO: 170 K/UL (ref 150–450)
PMV BLD AUTO: 10.2 FL (ref 9.2–12.9)
POTASSIUM SERPL-SCNC: 3.5 MMOL/L (ref 3.5–5.1)
PROT SERPL-MCNC: 6.2 G/DL (ref 6–8.4)
RBC # BLD AUTO: 3.37 M/UL (ref 4–5.4)
SODIUM SERPL-SCNC: 133 MMOL/L (ref 136–145)
WBC # BLD AUTO: 6.06 K/UL (ref 3.9–12.7)

## 2023-12-19 PROCEDURE — 63600175 PHARM REV CODE 636 W HCPCS: Performed by: STUDENT IN AN ORGANIZED HEALTH CARE EDUCATION/TRAINING PROGRAM

## 2023-12-19 PROCEDURE — 85025 COMPLETE CBC W/AUTO DIFF WBC: CPT | Performed by: STUDENT IN AN ORGANIZED HEALTH CARE EDUCATION/TRAINING PROGRAM

## 2023-12-19 PROCEDURE — C9113 INJ PANTOPRAZOLE SODIUM, VIA: HCPCS | Performed by: STUDENT IN AN ORGANIZED HEALTH CARE EDUCATION/TRAINING PROGRAM

## 2023-12-19 PROCEDURE — 63600175 PHARM REV CODE 636 W HCPCS: Performed by: NURSE PRACTITIONER

## 2023-12-19 PROCEDURE — 94761 N-INVAS EAR/PLS OXIMETRY MLT: CPT

## 2023-12-19 PROCEDURE — 36415 COLL VENOUS BLD VENIPUNCTURE: CPT | Performed by: STUDENT IN AN ORGANIZED HEALTH CARE EDUCATION/TRAINING PROGRAM

## 2023-12-19 PROCEDURE — 99232 SBSQ HOSP IP/OBS MODERATE 35: CPT | Mod: ,,, | Performed by: STUDENT IN AN ORGANIZED HEALTH CARE EDUCATION/TRAINING PROGRAM

## 2023-12-19 PROCEDURE — 80053 COMPREHEN METABOLIC PANEL: CPT | Performed by: STUDENT IN AN ORGANIZED HEALTH CARE EDUCATION/TRAINING PROGRAM

## 2023-12-19 PROCEDURE — 83735 ASSAY OF MAGNESIUM: CPT | Performed by: STUDENT IN AN ORGANIZED HEALTH CARE EDUCATION/TRAINING PROGRAM

## 2023-12-19 PROCEDURE — 99900035 HC TECH TIME PER 15 MIN (STAT)

## 2023-12-19 PROCEDURE — 99232 PR SUBSEQUENT HOSPITAL CARE,LEVL II: ICD-10-PCS | Mod: ,,, | Performed by: STUDENT IN AN ORGANIZED HEALTH CARE EDUCATION/TRAINING PROGRAM

## 2023-12-19 PROCEDURE — 27000221 HC OXYGEN, UP TO 24 HOURS

## 2023-12-19 RX ADMIN — PANTOPRAZOLE SODIUM 40 MG: 40 INJECTION, POWDER, LYOPHILIZED, FOR SOLUTION INTRAVENOUS at 08:12

## 2023-12-19 RX ADMIN — FUROSEMIDE 20 MG: 10 INJECTION, SOLUTION INTRAMUSCULAR; INTRAVENOUS at 08:12

## 2023-12-19 NOTE — PLAN OF CARE
[facility]     Department of Hospital Medicine     [address] [place] [address]     [phone] after hours  [phone] fax       NURSING HOME ORDERS    12/19/2023    Admit to Nursing Home:  SNF         Diagnoses: Small Bowl obstruction       Active Hospital Problems    Diagnosis  POA    *SBO (small bowel obstruction) [K56.609]  Yes    GI bleed [K92.2]  Yes    A-fib [I48.91]  Yes    Hypertension [I10]  Yes    Seizure disorder [G40.909]  Yes     Epilepsy type unknown        Resolved Hospital Problems   No resolved problems to display.       Patient is homebound due to:  SBO (small bowel obstruction)    Allergies:  Review of patient's allergies indicates:   Allergen Reactions    Adhesive Itching and Blisters    Penicillins Anaphylaxis    Tramadol Hives    Avelox [moxifloxacin] Rash     Facial and arm itching and redness. Pt states throat closes when given.    Amoxil [amoxicillin]     Aspridrox [aspirin, buffered]     Codeine Other (See Comments)     Throat swelling    Keflex [cephalexin]      Tolerated cefepime and cefazolin    Norvasc [amlodipine]     Red dye Hives    Robitussin [guaifenesin]     Sulfa (sulfonamide antibiotics)     Tylenol [acetaminophen]      Has reaction to Tylenol with red dye and unable to take Extra Strength Tylenol/ CAN ONLY TOLERATE REG STRENGTH TYLENOL    Vicks vaporub [camphor-eucalyptus oil-menthol]        Vitals:         Once weekly         Diet: Cardiac diet         Acitivities:   - Ambulate with assistance to bathroom   -    LABS:  Per facility protocol    Nursing Precautions:         - Fall precautions per nursing home protocol    CONSULTS:    Physical Therapy to evaluate and treat           MISCELLANEOUS CARE:             Colostomy Care:  Empty bag every shift and prn                                             Change and clean site every 48 hours        Routine Skin for Bedridden Patients:  Apply moisture barrier cream to  all    skin folds and wet areas in perineal area daily and after baths and                           all bowel movements.        Medication List        CHANGE how you take these medications      furosemide 20 MG tablet  Commonly known as: LASIX  Take 20 mg by mouth once daily.  What changed: Another medication with the same name was removed. Continue taking this medication, and follow the directions you see here.            CONTINUE taking these medications      acetaminophen 325 MG tablet  Commonly known as: TYLENOL  Take 2 tablets (650 mg total) by mouth every 4 (four) hours as needed for Pain.     apixaban 2.5 mg Tab  Commonly known as: ELIQUIS  Take 1 tablet (2.5 mg total) by mouth 2 (two) times daily.     calcium-vitamin D 600 mg-10 mcg (400 unit) Tab  Take 1 tablet by mouth once daily.     docusate sodium 100 MG capsule  Commonly known as: COLACE  Take 1 capsule (100 mg total) by mouth 2 (two) times daily.     levothyroxine 125 MCG tablet  Commonly known as: SYNTHROID  TAKE 1 TABLET (125 MCG TOTAL) BY MOUTH ONCE DAILY.     losartan 25 MG tablet  Commonly known as: COZAAR  Take 25 mg by mouth once daily.     magnesium oxide 400 mg (241.3 mg magnesium) tablet  Commonly known as: MAG-OX  Take 2 tablets (800 mg total) by mouth once daily.     metoprolol tartrate 25 MG tablet  Commonly known as: LOPRESSOR  Take 25 mg by mouth 2 (two) times daily.     ondansetron 4 MG tablet  Commonly known as: ZOFRAN  Take 4 mg by mouth every 6 (six) hours as needed for Nausea (vomiting).     OXcarbazepine 150 MG Tab  Commonly known as: TRILEPTAL  Take 150 mg by mouth nightly.     pantoprazole 40 MG tablet  Commonly known as: PROTONIX  Take 40 mg by mouth once daily.     PHENobarbitaL 97.2 MG tablet  Take 97.2 mg by mouth every evening.     polyethylene glycol 17 gram Pwpk  Commonly known as: GLYCOLAX  Take 17 g by mouth once daily.     potassium chloride SA 20 MEQ tablet  Commonly known as: K-DUR,KLOR-CON  Take 20 mEq by mouth  once daily.     pravastatin 20 MG tablet  Commonly known as: PRAVACHOL  Take 20 mg by mouth once daily.     vitamin D 1000 units Tab  Commonly known as: VITAMIN D3  Take 1,000 Units by mouth once daily.                    _________________________________  Carlton Gonzalez NP  12/19/2023

## 2023-12-19 NOTE — ASSESSMENT & PLAN NOTE
88 year old female with SBO now with bowel function.     KUB  demonstrating no residual contrast unsure if contrast passed or was NG tube   Now with ostomy output  Tolerating diet  Out of bed-to-chair   No plan for operative intervention now that patient has had return of bowel function. OK to d/c from a surgery standpoint once deemed appropriate by primary team.   Rest of care per primary

## 2023-12-19 NOTE — PROGRESS NOTES
Patient AAOx4, VSS, patient denies any pain. Colostomy care given.Report given to nurse Jo, preparing for d/c.

## 2023-12-19 NOTE — PLAN OF CARE
Pt is agreeable with discharge back to [facility] as skilled per NH request and [facility]. Family notified of discharge. Pt will be transported by [facility] scheduled  for 3PM. Floor nurse given report to call. Transport packet at desk.    12/19/23 1414   Final Note   Assessment Type Final Discharge Note   Anticipated Discharge Disposition SNF([facility])   What phone number can be called within the next 1-3 days to see how you are doing after discharge? [phone]   Post-Acute Status   Discharge Delays None known at this time

## 2023-12-19 NOTE — PLAN OF CARE
Problem: Adjustment to Illness (Gastrointestinal Bleeding)  Goal: Optimal Coping with Acute Illness  Outcome: Ongoing, Progressing     Problem: Bleeding (Gastrointestinal Bleeding)  Goal: Hemostasis  Outcome: Ongoing, Progressing     Problem: Adult Inpatient Plan of Care  Goal: Plan of Care Review  Outcome: Ongoing, Progressing  Goal: Optimal Comfort and Wellbeing  Outcome: Ongoing, Progressing  Goal: Readiness for Transition of Care  Outcome: Ongoing, Progressing     Problem: Skin Injury Risk Increased  Goal: Skin Health and Integrity  Outcome: Ongoing, Progressing     Problem: Fall Injury Risk  Goal: Absence of Fall and Fall-Related Injury  Outcome: Ongoing, Progressing     Problem: Nausea and Vomiting (Intestinal Obstruction)  Goal: Nausea and Vomiting Relief  Outcome: Ongoing, Progressing     Problem: Pain (Intestinal Obstruction)  Goal: Acceptable Pain Control  Outcome: Ongoing, Progressing     Problem: Hypertension Comorbidity  Goal: Blood Pressure in Desired Range  Outcome: Ongoing, Progressing

## 2023-12-19 NOTE — SUBJECTIVE & OBJECTIVE
Interval History: No acute events overnight. HDS on 0.5L. Tolerating regular diet. Ostomy with stool in bag.     Medications:  Continuous Infusions:   lactated ringers 75 mL/hr at 12/18/23 1800     Scheduled Meds:   furosemide (LASIX) injection  20 mg Intravenous Daily    pantoprazole  40 mg Intravenous BID    PHENobarbitaL  97.2 mg Oral Nightly     PRN Meds:hydrALAZINE, metoprolol, ondansetron     Review of patient's allergies indicates:   Allergen Reactions    Adhesive Itching and Blisters    Penicillins Anaphylaxis    Tramadol Hives    Avelox [moxifloxacin] Rash     Facial and arm itching and redness. Pt states throat closes when given.    Amoxil [amoxicillin]     Aspridrox [aspirin, buffered]     Codeine Other (See Comments)     Throat swelling    Keflex [cephalexin]      Tolerated cefepime and cefazolin    Norvasc [amlodipine]     Red dye Hives    Robitussin [guaifenesin]     Sulfa (sulfonamide antibiotics)     Tylenol [acetaminophen]      Has reaction to Tylenol with red dye and unable to take Extra Strength Tylenol/ CAN ONLY TOLERATE REG STRENGTH TYLENOL    Vicks vaporub [camphor-eucalyptus oil-menthol]      Objective:     Vital Signs (Most Recent):  Temp: 98.1 °F (36.7 °C) (12/19/23 0339)  Pulse: 71 (12/19/23 0339)  Resp: 18 (12/19/23 0339)  BP: 124/71 (12/19/23 0339)  SpO2: 96 % (12/19/23 0339) Vital Signs (24h Range):  Temp:  [97.8 °F (36.6 °C)-98.4 °F (36.9 °C)] 98.1 °F (36.7 °C)  Pulse:  [56-77] 71  Resp:  [17-20] 18  SpO2:  [90 %-98 %] 96 %  BP: (107-165)/(57-71) 124/71     Weight: 67.2 kg (148 lb 2.4 oz)  Body mass index is 32.06 kg/m².    Intake/Output - Last 3 Shifts         12/17 0700  12/18 0659 12/18 0700  12/19 0659    P.O.  660    I.V. (mL/kg) 1705.1 (25.7) 1788.2 (26.6)    NG/GT 60     Total Intake(mL/kg) 1765.1 (26.6) 2448.2 (36.4)    Urine (mL/kg/hr) 500 (0.3) 3900 (2.4)    Drains 350 350    Stool 0 0    Total Output 850 4250    Net +915.1 -1801.8          Stool Occurrence  1 x              Physical Exam  Vitals and nursing note reviewed.   Constitutional:       General: She is not in acute distress.  HENT:      Head: Normocephalic and atraumatic.      Mouth/Throat:      Mouth: Mucous membranes are moist.   Cardiovascular:      Rate and Rhythm: Normal rate.   Pulmonary:      Effort: Pulmonary effort is normal. No respiratory distress.   Abdominal:      General: There is no distension.      Palpations: Abdomen is soft.      Tenderness: There is no abdominal tenderness.      Comments: Colostomy in place with stool in bag   Skin:     General: Skin is warm and dry.   Neurological:      General: No focal deficit present.      Mental Status: She is alert and oriented to person, place, and time.          Significant Labs:  I have reviewed all pertinent lab results within the past 24 hours.  CBC:   Recent Labs   Lab 12/19/23  0426   WBC 6.06   RBC 3.37*   HGB 10.8*   HCT 32.3*      MCV 96   MCH 32.0*   MCHC 33.4     CMP:   Recent Labs   Lab 12/19/23  0426   GLU 88   CALCIUM 8.6*   ALBUMIN 3.1*   PROT 6.2   *   K 3.5   CO2 27   CL 95   BUN 13   CREATININE 0.7   ALKPHOS 82   ALT 30   AST 26   BILITOT 0.4       Significant Diagnostics:  I have reviewed all pertinent imaging results/findings within the past 24 hours.

## 2023-12-19 NOTE — PROGRESS NOTES
[facility] Surg  General Surgery  Progress Note    Subjective:     History of Present Illness:  87yo female with history of sigmoidectomy with end colostomy two years ago complicated by parastomal hernia s/p repair 9 months prior who presents with SBO.     Post-Op Info:  * No surgery found *         Interval History: No acute events overnight. HDS on 0.5L. Tolerating regular diet. Ostomy with stool in bag.     Medications:  Continuous Infusions:   lactated ringers 75 mL/hr at 12/18/23 1800     Scheduled Meds:   furosemide (LASIX) injection  20 mg Intravenous Daily    pantoprazole  40 mg Intravenous BID    PHENobarbitaL  97.2 mg Oral Nightly     PRN Meds:hydrALAZINE, metoprolol, ondansetron     Review of patient's allergies indicates:   Allergen Reactions    Adhesive Itching and Blisters    Penicillins Anaphylaxis    Tramadol Hives    Avelox [moxifloxacin] Rash     Facial and arm itching and redness. Pt states throat closes when given.    Amoxil [amoxicillin]     Aspridrox [aspirin, buffered]     Codeine Other (See Comments)     Throat swelling    Keflex [cephalexin]      Tolerated cefepime and cefazolin    Norvasc [amlodipine]     Red dye Hives    Robitussin [guaifenesin]     Sulfa (sulfonamide antibiotics)     Tylenol [acetaminophen]      Has reaction to Tylenol with red dye and unable to take Extra Strength Tylenol/ CAN ONLY TOLERATE REG STRENGTH TYLENOL    Vicks vaporub [camphor-eucalyptus oil-menthol]      Objective:     Vital Signs (Most Recent):  Temp: 98.1 °F (36.7 °C) (12/19/23 0339)  Pulse: 71 (12/19/23 0339)  Resp: 18 (12/19/23 0339)  BP: 124/71 (12/19/23 0339)  SpO2: 96 % (12/19/23 0339) Vital Signs (24h Range):  Temp:  [97.8 °F (36.6 °C)-98.4 °F (36.9 °C)] 98.1 °F (36.7 °C)  Pulse:  [56-77] 71  Resp:  [17-20] 18  SpO2:  [90 %-98 %] 96 %  BP: (107-165)/(57-71) 124/71     Weight: 67.2 kg (148 lb 2.4 oz)  Body mass index is 32.06 kg/m².    Intake/Output - Last 3 Shifts         12/17 0700  12/18 0659 12/18  0700  12/19 0659    P.O.  660    I.V. (mL/kg) 1705.1 (25.7) 1788.2 (26.6)    NG/GT 60     Total Intake(mL/kg) 1765.1 (26.6) 2448.2 (36.4)    Urine (mL/kg/hr) 500 (0.3) 3900 (2.4)    Drains 350 350    Stool 0 0    Total Output 850 4250    Net +915.1 -1801.8          Stool Occurrence  1 x             Physical Exam  Vitals and nursing note reviewed.   Constitutional:       General: She is not in acute distress.  HENT:      Head: Normocephalic and atraumatic.      Mouth/Throat:      Mouth: Mucous membranes are moist.   Cardiovascular:      Rate and Rhythm: Normal rate.   Pulmonary:      Effort: Pulmonary effort is normal. No respiratory distress.   Abdominal:      General: There is no distension.      Palpations: Abdomen is soft.      Tenderness: There is no abdominal tenderness.      Comments: Colostomy in place with stool in bag   Skin:     General: Skin is warm and dry.   Neurological:      General: No focal deficit present.      Mental Status: She is alert and oriented to person, place, and time.          Significant Labs:  I have reviewed all pertinent lab results within the past 24 hours.  CBC:   Recent Labs   Lab 12/19/23  0426   WBC 6.06   RBC 3.37*   HGB 10.8*   HCT 32.3*      MCV 96   MCH 32.0*   MCHC 33.4     CMP:   Recent Labs   Lab 12/19/23  0426   GLU 88   CALCIUM 8.6*   ALBUMIN 3.1*   PROT 6.2   *   K 3.5   CO2 27   CL 95   BUN 13   CREATININE 0.7   ALKPHOS 82   ALT 30   AST 26   BILITOT 0.4       Significant Diagnostics:  I have reviewed all pertinent imaging results/findings within the past 24 hours.  Assessment/Plan:     * SBO (small bowel obstruction)  88 year old female with SBO now with bowel function.     KUB  demonstrating no residual contrast unsure if contrast passed or was NG tube   Now with ostomy output  Tolerating diet  Out of bed-to-chair   No plan for operative intervention now that patient has had return of bowel function. OK to d/c from a surgery standpoint once deemed  appropriate by primary team.   Rest of care per primary    Hypertension  Mild HTN - 170s/70  Recommend PRN Hydralazine for SBP > 160s due to limited absorption of PO medications        Ruby Lundberg MD  General Surgery  [facility] Surg

## 2023-12-19 NOTE — DISCHARGE SUMMARY
[facility]  Discharge Summary      Patient Name: Annette Garcia  MRN: [id number]  STEFANO: [id number]  Patient Class: IP- Inpatient  Admission Date: 12/15/2023  Hospital Length of Stay: 4 days  Discharge Date and Time:  12/19/2023 4:16 PM  Attending Physician: Graham Kapadia MD   Discharging Provider: Cartlon Gonzalez NP  Primary Care Provider: Jose Valles MD    Primary Care Team: Networked reference to record PCT     HPI:   Ms. Annette Garcia is a 87 y/o White F w/ PMH of HTN, seizure disorder, afib on eliquis, LLE DVD, May-Thurner Syndrome, SBO s/p colostomy, and osteoporosis who presents from [facility] w/ 2 day history of multiple episodes of vomiting and abdominal pain. States she did not notice any blood, however as per EMS they noticed coffee ground emisis. Denied any fever, chills, SOB, chest pain.     In the ED, cbc and bmp unremarkable. CTA abdomen concerning for SBO. ED staff spoke w/ General surgery and it was decided to place NG tube and put it on suction.     * No surgery found *      Hospital Course:   She was admitted to the hospital medicine service for further care.  CTA of the abdomen was concerning for small bowel obstruction.  General surgery was consulted.  NG tube was placed to suction--now clamped.  She was started on clear liquid diet per General surgery recommendations.  We will continue to monitor and replace electrolytes. KUB demonstrating no residual contrast. She now has ostomy output. No plans by General Surgery for operative intervention, as patient has return of bowel function. Patient seen and examined, she is feeling well, vital signs stable and she is tolerating regular diet. Patient seen,examined and deemed stable to discharge back to [facility] as skilled care.      Goals of Care Treatment Preferences:  Code Status: Full Code    Living Will: Yes              Consults:   Consults (From admission, onward)          Status  Ordering Provider     Inpatient consult to [facility]  Once        Provider:  (Not yet assigned)    Completed DEANNA ESCAMILLA     IP consult to case management  Once        Provider:  (Not yet assigned)    Completed HOLLY ANGELES     Inpatient consult to Gastroenterology  Once        Provider:  Jese Obrien MD    Completed JULIANE BRAGG     Inpatient consult to General surgery  Once        Provider:  Chris Johnson MD    Completed TR TREJO            No new Assessment & Plan notes have been filed under this hospital service since the last note was generated.  Service: Hospital Medicine    Final Active Diagnoses:    Diagnosis Date Noted POA    PRINCIPAL PROBLEM:  SBO (small bowel obstruction) [K56.609] 08/18/2016 Yes    GI bleed [K92.2] 12/15/2023 Yes    A-fib [I48.91] 12/28/2019 Yes    Hypertension [I10] 12/05/2016 Yes    Seizure disorder [G40.909] 08/18/2016 Yes      Problems Resolved During this Admission:       Discharged Condition: stable    Disposition: Home or Self Care    Follow Up:   Follow-up Information       Jose Valles MD Follow up in 1 week(s).    Specialty: Family Medicine  Contact information:  [address] [address] [phone]                           Patient Instructions:      Diet Cardiac     Notify your health care provider if you experience any of the following:  increased confusion or weakness     Notify your health care provider if you experience any of the following:  persistent nausea and vomiting or diarrhea     Activity as tolerated       Significant Diagnostic Studies: Labs: CMP   Recent Labs   Lab 12/18/23 0430 12/19/23 0426    133*   K 3.7 3.5   CL 98 95   CO2 26 27   GLU 71 88   BUN 17 13   CREATININE 0.7 0.7   CALCIUM 8.3* 8.6*   PROT 5.8* 6.2   ALBUMIN 3.1* 3.1*   BILITOT 0.4 0.4   ALKPHOS 75 82   AST 36 26   ALT 38 30   ANIONGAP 13 11    and CBC   Recent Labs   Lab 12/18/23  0430 12/19/23  0426   WBC 6.01 6.06    HGB 9.6* 10.8*   HCT 28.6* 32.3*   * 170       Pending Diagnostic Studies:       None           Medications:  Reconciled Home Medications:      Medication List        CHANGE how you take these medications      furosemide 20 MG tablet  Commonly known as: LASIX  Take 20 mg by mouth once daily.  What changed: Another medication with the same name was removed. Continue taking this medication, and follow the directions you see here.            CONTINUE taking these medications      acetaminophen 325 MG tablet  Commonly known as: TYLENOL  Take 2 tablets (650 mg total) by mouth every 4 (four) hours as needed for Pain.     apixaban 2.5 mg Tab  Commonly known as: ELIQUIS  Take 1 tablet (2.5 mg total) by mouth 2 (two) times daily.     calcium-vitamin D 600 mg-10 mcg (400 unit) Tab  Take 1 tablet by mouth once daily.     docusate sodium 100 MG capsule  Commonly known as: COLACE  Take 1 capsule (100 mg total) by mouth 2 (two) times daily.     levothyroxine 125 MCG tablet  Commonly known as: SYNTHROID  TAKE 1 TABLET (125 MCG TOTAL) BY MOUTH ONCE DAILY.     losartan 25 MG tablet  Commonly known as: COZAAR  Take 25 mg by mouth once daily.     magnesium oxide 400 mg (241.3 mg magnesium) tablet  Commonly known as: MAG-OX  Take 2 tablets (800 mg total) by mouth once daily.     metoprolol tartrate 25 MG tablet  Commonly known as: LOPRESSOR  Take 25 mg by mouth 2 (two) times daily.     ondansetron 4 MG tablet  Commonly known as: ZOFRAN  Take 4 mg by mouth every 6 (six) hours as needed for Nausea (vomiting).     OXcarbazepine 150 MG Tab  Commonly known as: TRILEPTAL  Take 150 mg by mouth nightly.     pantoprazole 40 MG tablet  Commonly known as: PROTONIX  Take 40 mg by mouth once daily.     PHENobarbitaL 97.2 MG tablet  Take 97.2 mg by mouth every evening.     polyethylene glycol 17 gram Pwpk  Commonly known as: GLYCOLAX  Take 17 g by mouth once daily.     potassium chloride SA 20 MEQ tablet  Commonly known as:  K-DUR,KLOR-CON  Take 20 mEq by mouth once daily.     pravastatin 20 MG tablet  Commonly known as: PRAVACHOL  Take 20 mg by mouth once daily.     vitamin D 1000 units Tab  Commonly known as: VITAMIN D3  Take 1,000 Units by mouth once daily.              Indwelling Lines/Drains at time of discharge:   Lines/Drains/Airways       Drain  Duration                  Colostomy 10/29/19 1200 LLQ 1512 days    Female External Urinary Catheter 12/16/23 0000 3 days                    Time spent on the discharge of patient: 40 minutes         Carlton Gonzalez NP  Department of Hospital Medicine  [facility] Surg

## 2023-12-19 NOTE — PLAN OF CARE
12/19/23 1128   Post-Acute Status   Post-Acute Authorization Placement   Post-Acute Placement Status Pending post-acute provider review/more information requested  (Orders for return to [facility] sent via [employer]. Pending report info and order acceptance. Assigned CM alerted. [facility] request for 1400 pm  via stretcher.)

## 2023-12-31 PROCEDURE — 96365 THER/PROPH/DIAG IV INF INIT: CPT

## 2023-12-31 PROCEDURE — 99285 EMERGENCY DEPT VISIT HI MDM: CPT | Mod: 25

## 2024-01-01 ENCOUNTER — HOSPITAL ENCOUNTER (INPATIENT)
Facility: HOSPITAL | Age: 89
LOS: 2 days | Discharge: HOME | DRG: 690 | End: 2024-01-03
Attending: STUDENT IN AN ORGANIZED HEALTH CARE EDUCATION/TRAINING PROGRAM | Admitting: STUDENT IN AN ORGANIZED HEALTH CARE EDUCATION/TRAINING PROGRAM
Payer: MEDICARE

## 2024-01-01 DIAGNOSIS — I48.91 ATRIAL FIBRILLATION: ICD-10-CM

## 2024-01-01 DIAGNOSIS — R07.9 CHEST PAIN: ICD-10-CM

## 2024-01-01 DIAGNOSIS — N30.00 ACUTE CYSTITIS WITHOUT HEMATURIA: Primary | ICD-10-CM

## 2024-01-01 DIAGNOSIS — N39.0 UTI (URINARY TRACT INFECTION): ICD-10-CM

## 2024-01-01 LAB
ALBUMIN SERPL BCP-MCNC: 3.7 G/DL (ref 3.5–5.2)
ALP SERPL-CCNC: 87 U/L (ref 55–135)
ALT SERPL W/O P-5'-P-CCNC: 10 U/L (ref 10–44)
ANION GAP SERPL CALC-SCNC: 9 MMOL/L (ref 8–16)
AST SERPL-CCNC: 16 U/L (ref 10–40)
BACTERIA #/AREA URNS AUTO: NORMAL /HPF
BASOPHILS # BLD AUTO: 0.02 K/UL (ref 0–0.2)
BASOPHILS NFR BLD: 0.5 % (ref 0–1.9)
BILIRUB SERPL-MCNC: 0.4 MG/DL (ref 0.1–1)
BILIRUB UR QL STRIP: NEGATIVE
BUN SERPL-MCNC: 16 MG/DL (ref 8–23)
CALCIUM SERPL-MCNC: 9.4 MG/DL (ref 8.7–10.5)
CHLORIDE SERPL-SCNC: 99 MMOL/L (ref 95–110)
CLARITY UR REFRACT.AUTO: ABNORMAL
CO2 SERPL-SCNC: 25 MMOL/L (ref 23–29)
COLOR UR AUTO: YELLOW
CREAT SERPL-MCNC: 0.7 MG/DL (ref 0.5–1.4)
DIFFERENTIAL METHOD BLD: ABNORMAL
EOSINOPHIL # BLD AUTO: 0.3 K/UL (ref 0–0.5)
EOSINOPHIL NFR BLD: 7.2 % (ref 0–8)
ERYTHROCYTE [DISTWIDTH] IN BLOOD BY AUTOMATED COUNT: 14 % (ref 11.5–14.5)
EST. GFR  (NO RACE VARIABLE): >60 ML/MIN/1.73 M^2
GLUCOSE SERPL-MCNC: 96 MG/DL (ref 70–110)
GLUCOSE UR QL STRIP: NEGATIVE
HCT VFR BLD AUTO: 34.3 % (ref 37–48.5)
HGB BLD-MCNC: 11.4 G/DL (ref 12–16)
HGB UR QL STRIP: NEGATIVE
IMM GRANULOCYTES # BLD AUTO: 0.01 K/UL (ref 0–0.04)
IMM GRANULOCYTES NFR BLD AUTO: 0.2 % (ref 0–0.5)
KETONES UR QL STRIP: NEGATIVE
LEUKOCYTE ESTERASE UR QL STRIP: ABNORMAL
LYMPHOCYTES # BLD AUTO: 1.5 K/UL (ref 1–4.8)
LYMPHOCYTES NFR BLD: 36.2 % (ref 18–48)
MCH RBC QN AUTO: 32.8 PG (ref 27–31)
MCHC RBC AUTO-ENTMCNC: 33.2 G/DL (ref 32–36)
MCV RBC AUTO: 99 FL (ref 82–98)
MICROSCOPIC COMMENT: NORMAL
MONOCYTES # BLD AUTO: 0.4 K/UL (ref 0.3–1)
MONOCYTES NFR BLD: 10 % (ref 4–15)
NEUTROPHILS # BLD AUTO: 1.8 K/UL (ref 1.8–7.7)
NEUTROPHILS NFR BLD: 45.9 % (ref 38–73)
NITRITE UR QL STRIP: POSITIVE
NRBC BLD-RTO: 0 /100 WBC
PH UR STRIP: 7 [PH] (ref 5–8)
PLATELET # BLD AUTO: 189 K/UL (ref 150–450)
PMV BLD AUTO: 9.4 FL (ref 9.2–12.9)
POTASSIUM SERPL-SCNC: 4.3 MMOL/L (ref 3.5–5.1)
PROT SERPL-MCNC: 6.7 G/DL (ref 6–8.4)
PROT UR QL STRIP: NEGATIVE
RBC # BLD AUTO: 3.48 M/UL (ref 4–5.4)
RBC #/AREA URNS AUTO: 1 /HPF (ref 0–4)
SODIUM SERPL-SCNC: 133 MMOL/L (ref 136–145)
SP GR UR STRIP: 1.01 (ref 1–1.03)
SQUAMOUS #/AREA URNS AUTO: 1 /HPF
URN SPEC COLLECT METH UR: ABNORMAL
WBC # BLD AUTO: 4.01 K/UL (ref 3.9–12.7)
WBC #/AREA URNS AUTO: 5 /HPF (ref 0–5)

## 2024-01-01 PROCEDURE — 81001 URINALYSIS AUTO W/SCOPE: CPT | Performed by: EMERGENCY MEDICINE

## 2024-01-01 PROCEDURE — 85025 COMPLETE CBC W/AUTO DIFF WBC: CPT | Performed by: EMERGENCY MEDICINE

## 2024-01-01 PROCEDURE — 80053 COMPREHEN METABOLIC PANEL: CPT | Performed by: EMERGENCY MEDICINE

## 2024-01-01 PROCEDURE — 87040 BLOOD CULTURE FOR BACTERIA: CPT | Performed by: STUDENT IN AN ORGANIZED HEALTH CARE EDUCATION/TRAINING PROGRAM

## 2024-01-01 PROCEDURE — 25000003 PHARM REV CODE 250: Performed by: STUDENT IN AN ORGANIZED HEALTH CARE EDUCATION/TRAINING PROGRAM

## 2024-01-01 PROCEDURE — 87077 CULTURE AEROBIC IDENTIFY: CPT | Performed by: EMERGENCY MEDICINE

## 2024-01-01 PROCEDURE — 99221 1ST HOSP IP/OBS SF/LOW 40: CPT | Mod: ,,, | Performed by: INTERNAL MEDICINE

## 2024-01-01 PROCEDURE — 87088 URINE BACTERIA CULTURE: CPT | Performed by: EMERGENCY MEDICINE

## 2024-01-01 PROCEDURE — 93010 ELECTROCARDIOGRAM REPORT: CPT | Mod: ,,, | Performed by: INTERNAL MEDICINE

## 2024-01-01 PROCEDURE — 63600175 PHARM REV CODE 636 W HCPCS

## 2024-01-01 PROCEDURE — 87086 URINE CULTURE/COLONY COUNT: CPT | Performed by: EMERGENCY MEDICINE

## 2024-01-01 PROCEDURE — 63600175 PHARM REV CODE 636 W HCPCS: Performed by: STUDENT IN AN ORGANIZED HEALTH CARE EDUCATION/TRAINING PROGRAM

## 2024-01-01 PROCEDURE — 12000002 HC ACUTE/MED SURGE SEMI-PRIVATE ROOM

## 2024-01-01 PROCEDURE — 93005 ELECTROCARDIOGRAM TRACING: CPT | Performed by: INTERNAL MEDICINE

## 2024-01-01 PROCEDURE — 87186 SC STD MICRODIL/AGAR DIL: CPT | Performed by: EMERGENCY MEDICINE

## 2024-01-01 PROCEDURE — 25000003 PHARM REV CODE 250

## 2024-01-01 PROCEDURE — 93005 ELECTROCARDIOGRAM TRACING: CPT

## 2024-01-01 RX ORDER — LOSARTAN POTASSIUM 25 MG/1
25 TABLET ORAL DAILY
Status: DISCONTINUED | OUTPATIENT
Start: 2024-01-01 | End: 2024-01-03 | Stop reason: HOSPADM

## 2024-01-01 RX ORDER — TALC
6 POWDER (GRAM) TOPICAL NIGHTLY PRN
Status: DISCONTINUED | OUTPATIENT
Start: 2024-01-01 | End: 2024-01-03 | Stop reason: HOSPADM

## 2024-01-01 RX ORDER — NALOXONE HCL 0.4 MG/ML
0.02 VIAL (ML) INJECTION
Status: DISCONTINUED | OUTPATIENT
Start: 2024-01-01 | End: 2024-01-03 | Stop reason: HOSPADM

## 2024-01-01 RX ORDER — FUROSEMIDE 20 MG/1
20 TABLET ORAL DAILY
Status: DISCONTINUED | OUTPATIENT
Start: 2024-01-01 | End: 2024-01-03 | Stop reason: HOSPADM

## 2024-01-01 RX ORDER — PRAVASTATIN SODIUM 20 MG/1
20 TABLET ORAL DAILY
Status: DISCONTINUED | OUTPATIENT
Start: 2024-01-01 | End: 2024-01-03 | Stop reason: HOSPADM

## 2024-01-01 RX ORDER — PANTOPRAZOLE SODIUM 40 MG/1
40 TABLET, DELAYED RELEASE ORAL DAILY
Status: DISCONTINUED | OUTPATIENT
Start: 2024-01-01 | End: 2024-01-03 | Stop reason: HOSPADM

## 2024-01-01 RX ORDER — CHOLECALCIFEROL (VITAMIN D3) 25 MCG
1000 TABLET ORAL DAILY
Status: DISCONTINUED | OUTPATIENT
Start: 2024-01-01 | End: 2024-01-03 | Stop reason: HOSPADM

## 2024-01-01 RX ORDER — IPRATROPIUM BROMIDE AND ALBUTEROL SULFATE 2.5; .5 MG/3ML; MG/3ML
3 SOLUTION RESPIRATORY (INHALATION) EVERY 4 HOURS PRN
Status: DISCONTINUED | OUTPATIENT
Start: 2024-01-01 | End: 2024-01-03 | Stop reason: HOSPADM

## 2024-01-01 RX ORDER — METOPROLOL TARTRATE 25 MG/1
25 TABLET, FILM COATED ORAL 2 TIMES DAILY
Status: DISCONTINUED | OUTPATIENT
Start: 2024-01-01 | End: 2024-01-03 | Stop reason: HOSPADM

## 2024-01-01 RX ORDER — POTASSIUM CHLORIDE 20 MEQ/1
20 TABLET, EXTENDED RELEASE ORAL DAILY
Status: DISCONTINUED | OUTPATIENT
Start: 2024-01-01 | End: 2024-01-03 | Stop reason: HOSPADM

## 2024-01-01 RX ORDER — PHENOBARBITAL 32.4 MG/1
97.2 TABLET ORAL NIGHTLY
Status: DISCONTINUED | OUTPATIENT
Start: 2024-01-01 | End: 2024-01-03 | Stop reason: HOSPADM

## 2024-01-01 RX ORDER — POLYETHYLENE GLYCOL 3350 17 G/17G
17 POWDER, FOR SOLUTION ORAL DAILY
Status: DISCONTINUED | OUTPATIENT
Start: 2024-01-01 | End: 2024-01-03 | Stop reason: HOSPADM

## 2024-01-01 RX ORDER — PROCHLORPERAZINE EDISYLATE 5 MG/ML
5 INJECTION INTRAMUSCULAR; INTRAVENOUS EVERY 6 HOURS PRN
Status: DISCONTINUED | OUTPATIENT
Start: 2024-01-01 | End: 2024-01-03 | Stop reason: HOSPADM

## 2024-01-01 RX ORDER — SODIUM CHLORIDE 0.9 % (FLUSH) 0.9 %
5 SYRINGE (ML) INJECTION
Status: DISCONTINUED | OUTPATIENT
Start: 2024-01-01 | End: 2024-01-03 | Stop reason: HOSPADM

## 2024-01-01 RX ADMIN — METOPROLOL TARTRATE 25 MG: 25 TABLET, FILM COATED ORAL at 08:01

## 2024-01-01 RX ADMIN — DOCUSATE SODIUM 100 MG: 100 CAPSULE, LIQUID FILLED ORAL at 08:01

## 2024-01-01 RX ADMIN — PHENOBARBITAL 97.2 MG: 32.4 TABLET ORAL at 08:01

## 2024-01-01 RX ADMIN — CEFEPIME 1 G: 1 INJECTION, POWDER, FOR SOLUTION INTRAMUSCULAR; INTRAVENOUS at 07:01

## 2024-01-01 RX ADMIN — PANTOPRAZOLE SODIUM 40 MG: 40 TABLET, DELAYED RELEASE ORAL at 12:01

## 2024-01-01 RX ADMIN — POTASSIUM CHLORIDE 20 MEQ: 1500 TABLET, EXTENDED RELEASE ORAL at 12:01

## 2024-01-01 RX ADMIN — PRAVASTATIN SODIUM 20 MG: 20 TABLET ORAL at 12:01

## 2024-01-01 RX ADMIN — FUROSEMIDE 20 MG: 20 TABLET ORAL at 12:01

## 2024-01-01 RX ADMIN — CHOLECALCIFEROL TAB 25 MCG (1000 UNIT) 1000 UNITS: 25 TAB at 12:01

## 2024-01-01 RX ADMIN — LOSARTAN POTASSIUM 25 MG: 25 TABLET, FILM COATED ORAL at 12:01

## 2024-01-01 RX ADMIN — APIXABAN 2.5 MG: 2.5 TABLET, FILM COATED ORAL at 08:01

## 2024-01-01 NOTE — ASSESSMENT & PLAN NOTE
88-year-old female with history of SBO s/p colostomy, presented from her nursing home for PICC line for IV antibiotics for MDR Pseudomonas UTI.    Recommendations:  - Obtain cultures from nursing home  - Follow-up urine and blood culture  - Continue cefepime IV

## 2024-01-01 NOTE — H&P
Anirudh Naidu - Emergency Dept  [facility] Medicine  History & Physical    Patient Name: Annette Garcia  MRN: [id number]  Patient Class: IP- Inpatient  Admission Date: 1/1/2024  Attending Physician: Abe Thomas DO   Primary Care Provider: Jose Valles MD         Patient information was obtained from patient and ER records.     Subjective:     Principal Problem:Acute cystitis without hematuria    Chief Complaint:   Chief Complaint   Patient presents with    Vascular Access Problem     Pt coming from [facility] to have a PICC line inserted for antibiotics for a UTI. Per EMS NH staff state they were instructed by the doctor to send her to [facility] tonight to have this done. Pt is A&O per normal        HPI: Annette Garcia is an 87 yo F with PMHx of chronic DVT, A fib, SBO, colostomy, and large B cell lymphoma s/p chemo who presented to ED from [facility] for a PICC line placement. She is A&O x4. Patient had a UA and urine cx collected 12/28 at her NH which showed multiresistant pseudomonas requiring IV abx. She has multiple allergies, including anaphylaxis with penicillin. Patient endorses urinary frequency and nausea without vomiting. She uses a wheelchair. Denies fever, chills, chest pain, palpitations, SOB, cough, and LE swelling.    In ED: CBC with stable chronic anemia and without leukocytosis. CMP unremarkable except for Na 133. UA with WBC 5, but with 3+ leuks and positive nitrites. Discussed with PICC team, who would like 48 hr of negative blood cx prior to PICC placement as patient has a history of bacteremia. Given IV cefepime. ID consulted. Admitted to hospital medicine for further management.     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       Past Surgical History:   Procedure Laterality Date    APPENDECTOMY      BACK SURGERY  1988    vertebral fracture    BACK SURGERY  02/2013    lumbar L2-5    CATARACT EXTRACTION, BILATERAL Bilateral     CHOLECYSTECTOMY      open    ESOPHAGOGASTRODUODENOSCOPY N/A 2/15/2023    Procedure: EGD (ESOPHAGOGASTRODUODENOSCOPY);  Surgeon: Keith Chavarria MD;  Location: [facility] ENDO;  Service: Endoscopy;  Laterality: N/A;    EYE SURGERY Bilateral     cataract removal with lens implant    HYSTERECTOMY      PERCUTANEOUS TRANSLUMINAL ANGIOPLASTY (PTA) OF PERIPHERAL VESSEL N/A 2/3/2020    Procedure: PTA, PERIPHERAL VESSEL;  Surgeon: Timmy Simmons MD;  Location: [facility] CATH LAB/EP;  Service: Cardiology;  Laterality: N/A;    PORTACATH PLACEMENT Right 09/2016    RENAL ARTERY STENT Left 07/19/2017    ROBOT-ASSISTED LAPAROSCOPIC REPAIR OF VENTRAL HERNIA N/A 3/23/2023    Procedure: ROBOTIC REPAIR, HERNIA, VENTRAL;  Surgeon: Chris Johnson MD;  Location: [facility] OR;  Service: General;  Laterality: N/A;  Robotic Parastomal hernia repair    SIGMOIDECTOMY  10/29/2019    SMALL INTESTINE SURGERY  08/23/2016    THROMBECTOMY N/A 2/3/2020    Procedure: THROMBECTOMY;  Surgeon: Timmy Simmons MD;  Location: [facility] CATH LAB/EP;  Service: Cardiology;  Laterality: N/A;    TONSILLECTOMY      VAGINAL HYSTERECTOMY W/ ANTERIOR AND POSTERIOR VAGINAL REPAIR         Review of patient's allergies indicates:   Allergen Reactions    Adhesive Itching and Blisters    Penicillins Anaphylaxis    Tramadol Hives    Avelox [moxifloxacin] Rash     Facial and arm itching and redness. Pt states throat closes when given.    Amoxil [amoxicillin]     Aspridrox [aspirin, buffered]     Codeine Other (See Comments)     Throat  swelling    Keflex [cephalexin]      Tolerated cefepime and cefazolin    Norvasc [amlodipine]     Red dye Hives    Robitussin [guaifenesin]     Sulfa (sulfonamide antibiotics)     Tylenol [acetaminophen]      Has reaction to Tylenol with red dye and unable to take Extra Strength Tylenol/ CAN ONLY TOLERATE REG STRENGTH TYLENOL    Vicks vaporub [camphor-eucalyptus oil-menthol]        No current facility-administered medications on file prior to encounter.     Current Outpatient Medications on File Prior to Encounter   Medication Sig    acetaminophen (TYLENOL) 325 MG tablet Take 2 tablets (650 mg total) by mouth every 4 (four) hours as needed for Pain.    apixaban (ELIQUIS) 2.5 mg Tab Take 1 tablet (2.5 mg total) by mouth 2 (two) times daily.    calcium-vitamin D 600 mg(1,500mg) -400 unit Tab Take 1 tablet by mouth once daily.    docusate sodium (COLACE) 100 MG capsule Take 1 capsule (100 mg total) by mouth 2 (two) times daily.    furosemide (LASIX) 20 MG tablet Take 20 mg by mouth once daily.    levothyroxine (SYNTHROID) 125 MCG tablet TAKE 1 TABLET (125 MCG TOTAL) BY MOUTH ONCE DAILY.    losartan (COZAAR) 25 MG tablet Take 25 mg by mouth once daily.    magnesium oxide (MAG-OX) 400 mg (241.3 mg magnesium) tablet Take 2 tablets (800 mg total) by mouth once daily.    metoprolol tartrate (LOPRESSOR) 25 MG tablet Take 25 mg by mouth 2 (two) times daily.    ondansetron (ZOFRAN) 4 MG tablet Take 4 mg by mouth every 6 (six) hours as needed for Nausea (vomiting).    OXcarbazepine (TRILEPTAL) 150 MG Tab Take 150 mg by mouth nightly.    pantoprazole (PROTONIX) 40 MG tablet Take 40 mg by mouth once daily.    PHENobarbitaL 97.2 MG tablet Take 97.2 mg by mouth every evening.    polyethylene glycol (GLYCOLAX) 17 gram PwPk Take 17 g by mouth once daily.    potassium chloride SA (K-DUR,KLOR-CON) 20 MEQ tablet Take 20 mEq by mouth once daily.    pravastatin (PRAVACHOL) 20 MG tablet Take 20 mg by mouth once daily.    vitamin D (VITAMIN  D3) 1000 units Tab Take 1,000 Units by mouth once daily.     Family History       Problem Relation (Age of Onset)    Heart attack Father    Hypertension Father    Pancreatic cancer Mother          Tobacco Use    Smoking status: Never    Smokeless tobacco: Never   Substance and Sexual Activity    Alcohol use: No    Drug use: No    Sexual activity: Not Currently     Partners: Male     Review of Systems   Constitutional:  Negative for activity change, chills and fever.   HENT:  Negative for trouble swallowing.    Eyes:  Negative for photophobia and visual disturbance.   Respiratory:  Negative for cough, chest tightness and shortness of breath.    Cardiovascular:  Negative for chest pain, palpitations and leg swelling.   Gastrointestinal:  Positive for abdominal pain (chronic) and nausea. Negative for constipation, diarrhea and vomiting.   Genitourinary:  Positive for frequency and urgency. Negative for difficulty urinating, dysuria and hematuria.   Musculoskeletal:  Negative for back pain, gait problem and neck pain.   Skin:  Negative for rash and wound.   Neurological:  Positive for weakness (chronic). Negative for dizziness, syncope, speech difficulty and light-headedness.   Psychiatric/Behavioral:  Negative for agitation and confusion. The patient is not nervous/anxious.      Objective:     Vital Signs (Most Recent):  Temp: 98.2 °F (36.8 °C) (01/01/24 0700)  Pulse: 69 (01/01/24 0800)  Resp: 16 (12/31/23 2023)  BP: (!) 163/64 (01/01/24 0800)  SpO2: 97 % (01/01/24 0800) Vital Signs (24h Range):  Temp:  [97.6 °F (36.4 °C)-98.2 °F (36.8 °C)] 98.2 °F (36.8 °C)  Pulse:  [60-69] 69  Resp:  [16] 16  SpO2:  [97 %-98 %] 97 %  BP: (143-163)/(54-65) 163/64        There is no height or weight on file to calculate BMI.     Physical Exam  Vitals and nursing note reviewed.   Constitutional:       General: She is not in acute distress.     Appearance: She is well-developed. She is obese. She is not ill-appearing.   HENT:      Head:  Normocephalic and atraumatic.      Mouth/Throat:      Pharynx: No oropharyngeal exudate.   Eyes:      Conjunctiva/sclera: Conjunctivae normal.      Pupils: Pupils are equal, round, and reactive to light.   Cardiovascular:      Rate and Rhythm: Normal rate and regular rhythm.      Heart sounds: Normal heart sounds.   Pulmonary:      Effort: Pulmonary effort is normal. No respiratory distress.      Breath sounds: Normal breath sounds. No wheezing.   Abdominal:      General: Bowel sounds are normal. There is no distension.      Palpations: Abdomen is soft.      Tenderness: There is abdominal tenderness (mild, generalized). There is no guarding.      Comments: Colostomy noted. Well healed surgical abdominal scars   Musculoskeletal:         General: No tenderness. Normal range of motion.      Cervical back: Normal range of motion and neck supple.      Right lower leg: No edema.      Left lower leg: No edema.   Lymphadenopathy:      Cervical: No cervical adenopathy.   Skin:     General: Skin is warm and dry.      Capillary Refill: Capillary refill takes less than 2 seconds.      Findings: No rash.   Neurological:      Mental Status: She is alert and oriented to person, place, and time. Mental status is at baseline.      Cranial Nerves: No cranial nerve deficit.      Sensory: No sensory deficit.      Coordination: Coordination normal.   Psychiatric:         Behavior: Behavior normal.         Thought Content: Thought content normal.         Judgment: Judgment normal.              CRANIAL NERVES     CN III, IV, VI   Pupils are equal, round, and reactive to light.       Significant Labs: All pertinent labs within the past 24 hours have been reviewed.  CBC:   Recent Labs   Lab 01/01/24  0716   WBC 4.01   HGB 11.4*   HCT 34.3*        CMP:   Recent Labs   Lab 01/01/24  0716   *   K 4.3   CL 99   CO2 25   GLU 96   BUN 16   CREATININE 0.7   CALCIUM 9.4   PROT 6.7   ALBUMIN 3.7   BILITOT 0.4   ALKPHOS 87   AST 16   ALT  10   ANIONGAP 9     Urine Studies:   Recent Labs   Lab 01/01/24  0736   COLORU Yellow   APPEARANCEUA Hazy*   PHUR 7.0   SPECGRAV 1.015   PROTEINUA Negative   GLUCUA Negative   KETONESU Negative   BILIRUBINUA Negative   OCCULTUA Negative   NITRITE Positive*   LEUKOCYTESUR 3+*   RBCUA 1   WBCUA 5   BACTERIA Occasional   SQUAMEPITHEL 1       Significant Imaging: I have reviewed all pertinent imaging results/findings within the past 24 hours.  Assessment/Plan:     * Acute cystitis without hematuria  87 yo F with PMHx of chronic DVT, A fib, and large B cell lymphoma who presented to ED from [facility] for a PICC line placement. Recent urine cx at her NH which showed multiresistant pseudomonas requiring IV abx. UA with positive leuks and nitrites on admit.     - started on cefepime in ED; will continue  - PICC team consulted and requesting 48 hr of negative blood cx  - blood cx pending  - new urine cx pending   - ID consulted;  - discussed possibility of using one time gentamicin; ID will follow up    Chronic deep vein thrombosis (DVT) of left lower extremity  - continue eliquis    Coronary artery disease  - continue statin    A-fib  Patient with Paroxysmal (<7 days) atrial fibrillation which is controlled currently with Beta Blocker. Patient is currently in sinus rhythm.[id number] Score: 4. Anticoagulation indicated. Anticoagulation done with eliquis .    Anemia due to antineoplastic chemotherapy  Patient's anemia is currently controlled. Has not received any PRBCs to date. Current CBC reviewed-   Lab Results   Component Value Date    HGB 11.4 (L) 01/01/2024    HCT 34.3 (L) 01/01/2024     Monitor serial CBC and transfuse if patient becomes hemodynamically unstable, symptomatic or H/H drops below 7/21.    Hypertension  - BP elevated on admit  - continue losartan 25 mg, lopressor 25 mg and lasix    DLBCL (diffuse large B cell lymphoma)  - completed chemotherapy in 2017  - has not followed with heme/onc in many years    SBO  (small bowel obstruction)  S/p bowel resection  Colostomy   No acute issues    Hypothyroidism  - continue synthroid     Seizure disorder  - continue phenobarbital       VTE Risk Mitigation (From admission, onward)           Ordered     IP VTE HIGH RISK PATIENT  Once         01/01/24 0924     Reason for No Pharmacological VTE Prophylaxis  Once        Question:  Reasons:  Answer:  Already adequately anticoagulated on oral Anticoagulants    01/01/24 0924                                    Maddy Cota PA-C  Department of Hospital Medicine  [facility] - Emergency Dept

## 2024-01-01 NOTE — HPI
Annette Garcia is an 87 yo F with PMHx of chronic DVT, A fib, SBO, colostomy, and large B cell lymphoma s/p chemo who presented to ED from [facility] for a PICC line placement. She is A&O x4. Patient had a UA and urine cx collected 12/28 at her NH which showed multiresistant pseudomonas requiring IV abx. She has multiple allergies, including anaphylaxis with penicillin. Patient endorses urinary frequency and nausea without vomiting. She uses a wheelchair. Denies fever, chills, chest pain, palpitations, SOB, cough, and LE swelling.    In ED: CBC with stable chronic anemia and without leukocytosis. CMP unremarkable except for Na 133. UA with WBC 5, but with 3+ leuks and positive nitrites. Discussed with PICC team, who would like 48 hr of negative blood cx prior to PICC placement as patient has a history of bacteremia. Given IV cefepime. ID consulted. Admitted to hospital medicine for further management.

## 2024-01-01 NOTE — ASSESSMENT & PLAN NOTE
Patient with Paroxysmal (<7 days) atrial fibrillation which is controlled currently with Beta Blocker. Patient is currently in sinus rhythm.[id number] Score: 4. Anticoagulation indicated. Anticoagulation done with eliquis .

## 2024-01-01 NOTE — ASSESSMENT & PLAN NOTE
89 yo F with PMHx of chronic DVT, A fib, and large B cell lymphoma who presented to ED from [facility] for a PICC line placement. Recent urine cx at her NH which showed multiresistant pseudomonas requiring IV abx. UA with positive leuks and nitrites on admit.     - started on cefepime in ED; will continue  - PICC team consulted and requesting 48 hr of negative blood cx  - blood cx pending  - new urine cx pending   - ID consulted;  - discussed possibility of using one time gentamicin; ID will follow up

## 2024-01-01 NOTE — ED NOTES
Assumed care of pt at this time. VSS, RR even and unlabored. Resting in bed comfortably. No voiced compaints of pain or discomfort at this time. Safety protocols remain, will continue to monitor.     Patient identifiers verified and correct for Annette Garcia  LOC: The patient is awake, alert and aware of environment with an appropriate affect, the patient is oriented x 4 and speaking appropriately.   APPEARANCE: Patient appears comfortable and in no acute distress, patient is clean and well groomed.  SKIN: The skin is warm and dry, color consistent with ethnicity, patient has normal skin turgor and moist mucus membranes, skin intact, no breakdown or bruising noted.   MUSCULOSKELETAL: Patient moving all extremities spontaneously, no swelling noted.  RESPIRATORY: Airway is open and patent, respirations are spontaneous, patient has a normal effort and rate, no accessory muscle use noted, pt placed on continuous pulse ox with O2 sats noted at 98% on room air.  CARDIAC: Pt placed on cardiac monitor. Patient has a normal rate and regular rhythm, no edema noted, capillary refill < 3 seconds.   GASTRO: Soft and non tender to palpation, no distention noted, normoactive bowel sounds present in all four quadrants. Pt states bowel movements have been regular.  : Pt denies any pain or frequency with urination.  NEURO: Pt opens eyes spontaneously, behavior appropriate to situation, follows commands, facial expression symmetrical, bilateral hand grasp equal and even, purposeful motor response noted, normal sensation in all extremities when touched with a finger.

## 2024-01-01 NOTE — SUBJECTIVE & OBJECTIVE
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       Past Surgical History:   Procedure Laterality Date    APPENDECTOMY      BACK SURGERY  1988    vertebral fracture    BACK SURGERY  02/2013    lumbar L2-5    CATARACT EXTRACTION, BILATERAL Bilateral     CHOLECYSTECTOMY      open    ESOPHAGOGASTRODUODENOSCOPY N/A 2/15/2023    Procedure: EGD (ESOPHAGOGASTRODUODENOSCOPY);  Surgeon: Keith Chavarria MD;  Location: [facility] ENDO;  Service: Endoscopy;  Laterality: N/A;    EYE SURGERY Bilateral     cataract removal with lens implant    HYSTERECTOMY      PERCUTANEOUS TRANSLUMINAL ANGIOPLASTY (PTA) OF PERIPHERAL VESSEL N/A 2/3/2020    Procedure: PTA, PERIPHERAL VESSEL;  Surgeon: Timmy Simmons MD;  Location: [facility] CATH LAB/EP;  Service: Cardiology;  Laterality: N/A;    PORTACATH PLACEMENT Right 09/2016    RENAL ARTERY STENT Left 07/19/2017    ROBOT-ASSISTED LAPAROSCOPIC REPAIR OF VENTRAL HERNIA N/A 3/23/2023    Procedure: ROBOTIC REPAIR, HERNIA, VENTRAL;  Surgeon: Chris Johnson MD;  Location: [facility] OR;  Service: General;  Laterality: N/A;  Robotic Parastomal hernia repair    SIGMOIDECTOMY  10/29/2019    SMALL INTESTINE SURGERY  08/23/2016    THROMBECTOMY N/A 2/3/2020    Procedure: THROMBECTOMY;  Surgeon: Timmy Simmons MD;  Location: [facility] CATH LAB/EP;  Service: Cardiology;  Laterality: N/A;    TONSILLECTOMY      VAGINAL HYSTERECTOMY W/ ANTERIOR AND POSTERIOR VAGINAL REPAIR         Review of patient's allergies indicates:   Allergen  Reactions    Adhesive Itching and Blisters    Penicillins Anaphylaxis    Tramadol Hives    Avelox [moxifloxacin] Rash     Facial and arm itching and redness. Pt states throat closes when given.    Amoxil [amoxicillin]     Aspridrox [aspirin, buffered]     Codeine Other (See Comments)     Throat swelling    Keflex [cephalexin]      Tolerated cefepime and cefazolin    Norvasc [amlodipine]     Red dye Hives    Robitussin [guaifenesin]     Sulfa (sulfonamide antibiotics)     Tylenol [acetaminophen]      Has reaction to Tylenol with red dye and unable to take Extra Strength Tylenol/ CAN ONLY TOLERATE REG STRENGTH TYLENOL    Vicks vaporub [camphor-eucalyptus oil-menthol]        No current facility-administered medications on file prior to encounter.     Current Outpatient Medications on File Prior to Encounter   Medication Sig    acetaminophen (TYLENOL) 325 MG tablet Take 2 tablets (650 mg total) by mouth every 4 (four) hours as needed for Pain.    apixaban (ELIQUIS) 2.5 mg Tab Take 1 tablet (2.5 mg total) by mouth 2 (two) times daily.    calcium-vitamin D 600 mg(1,500mg) -400 unit Tab Take 1 tablet by mouth once daily.    docusate sodium (COLACE) 100 MG capsule Take 1 capsule (100 mg total) by mouth 2 (two) times daily.    furosemide (LASIX) 20 MG tablet Take 20 mg by mouth once daily.    levothyroxine (SYNTHROID) 125 MCG tablet TAKE 1 TABLET (125 MCG TOTAL) BY MOUTH ONCE DAILY.    losartan (COZAAR) 25 MG tablet Take 25 mg by mouth once daily.    magnesium oxide (MAG-OX) 400 mg (241.3 mg magnesium) tablet Take 2 tablets (800 mg total) by mouth once daily.    metoprolol tartrate (LOPRESSOR) 25 MG tablet Take 25 mg by mouth 2 (two) times daily.    ondansetron (ZOFRAN) 4 MG tablet Take 4 mg by mouth every 6 (six) hours as needed for Nausea (vomiting).    OXcarbazepine (TRILEPTAL) 150 MG Tab Take 150 mg by mouth nightly.    pantoprazole (PROTONIX) 40 MG tablet Take 40 mg by mouth once daily.    PHENobarbitaL 97.2 MG tablet  Take 97.2 mg by mouth every evening.    polyethylene glycol (GLYCOLAX) 17 gram PwPk Take 17 g by mouth once daily.    potassium chloride SA (K-DUR,KLOR-CON) 20 MEQ tablet Take 20 mEq by mouth once daily.    pravastatin (PRAVACHOL) 20 MG tablet Take 20 mg by mouth once daily.    vitamin D (VITAMIN D3) 1000 units Tab Take 1,000 Units by mouth once daily.     Family History       Problem Relation (Age of Onset)    Heart attack Father    Hypertension Father    Pancreatic cancer Mother          Tobacco Use    Smoking status: Never    Smokeless tobacco: Never   Substance and Sexual Activity    Alcohol use: No    Drug use: No    Sexual activity: Not Currently     Partners: Male     Review of Systems   Constitutional:  Negative for activity change, chills and fever.   HENT:  Negative for trouble swallowing.    Eyes:  Negative for photophobia and visual disturbance.   Respiratory:  Negative for cough, chest tightness and shortness of breath.    Cardiovascular:  Negative for chest pain, palpitations and leg swelling.   Gastrointestinal:  Positive for abdominal pain (chronic) and nausea. Negative for constipation, diarrhea and vomiting.   Genitourinary:  Positive for frequency and urgency. Negative for difficulty urinating, dysuria and hematuria.   Musculoskeletal:  Negative for back pain, gait problem and neck pain.   Skin:  Negative for rash and wound.   Neurological:  Positive for weakness (chronic). Negative for dizziness, syncope, speech difficulty and light-headedness.   Psychiatric/Behavioral:  Negative for agitation and confusion. The patient is not nervous/anxious.      Objective:     Vital Signs (Most Recent):  Temp: 98.2 °F (36.8 °C) (01/01/24 0700)  Pulse: 69 (01/01/24 0800)  Resp: 16 (12/31/23 2023)  BP: (!) 163/64 (01/01/24 0800)  SpO2: 97 % (01/01/24 0800) Vital Signs (24h Range):  Temp:  [97.6 °F (36.4 °C)-98.2 °F (36.8 °C)] 98.2 °F (36.8 °C)  Pulse:  [60-69] 69  Resp:  [16] 16  SpO2:  [97 %-98 %] 97 %  BP:  (143-163)/(54-65) 163/64        There is no height or weight on file to calculate BMI.     Physical Exam  Vitals and nursing note reviewed.   Constitutional:       General: She is not in acute distress.     Appearance: She is well-developed. She is obese. She is not ill-appearing.   HENT:      Head: Normocephalic and atraumatic.      Mouth/Throat:      Pharynx: No oropharyngeal exudate.   Eyes:      Conjunctiva/sclera: Conjunctivae normal.      Pupils: Pupils are equal, round, and reactive to light.   Cardiovascular:      Rate and Rhythm: Normal rate and regular rhythm.      Heart sounds: Normal heart sounds.   Pulmonary:      Effort: Pulmonary effort is normal. No respiratory distress.      Breath sounds: Normal breath sounds. No wheezing.   Abdominal:      General: Bowel sounds are normal. There is no distension.      Palpations: Abdomen is soft.      Tenderness: There is abdominal tenderness (mild, generalized). There is no guarding.      Comments: Colostomy noted. Well healed surgical abdominal scars   Musculoskeletal:         General: No tenderness. Normal range of motion.      Cervical back: Normal range of motion and neck supple.      Right lower leg: No edema.      Left lower leg: No edema.   Lymphadenopathy:      Cervical: No cervical adenopathy.   Skin:     General: Skin is warm and dry.      Capillary Refill: Capillary refill takes less than 2 seconds.      Findings: No rash.   Neurological:      Mental Status: She is alert and oriented to person, place, and time. Mental status is at baseline.      Cranial Nerves: No cranial nerve deficit.      Sensory: No sensory deficit.      Coordination: Coordination normal.   Psychiatric:         Behavior: Behavior normal.         Thought Content: Thought content normal.         Judgment: Judgment normal.              CRANIAL NERVES     CN III, IV, VI   Pupils are equal, round, and reactive to light.       Significant Labs: All pertinent labs within the past 24 hours  have been reviewed.  CBC:   Recent Labs   Lab 01/01/24  0716   WBC 4.01   HGB 11.4*   HCT 34.3*        CMP:   Recent Labs   Lab 01/01/24  0716   *   K 4.3   CL 99   CO2 25   GLU 96   BUN 16   CREATININE 0.7   CALCIUM 9.4   PROT 6.7   ALBUMIN 3.7   BILITOT 0.4   ALKPHOS 87   AST 16   ALT 10   ANIONGAP 9     Urine Studies:   Recent Labs   Lab 01/01/24  0736   COLORU Yellow   APPEARANCEUA Hazy*   PHUR 7.0   SPECGRAV 1.015   PROTEINUA Negative   GLUCUA Negative   KETONESU Negative   BILIRUBINUA Negative   OCCULTUA Negative   NITRITE Positive*   LEUKOCYTESUR 3+*   RBCUA 1   WBCUA 5   BACTERIA Occasional   SQUAMEPITHEL 1       Significant Imaging: I have reviewed all pertinent imaging results/findings within the past 24 hours.

## 2024-01-01 NOTE — ED TRIAGE NOTES
Annette Garcia, a 88 y.o. female Aox4, ems stretcher, presents to the ED w/ complaint of needing a PICC line insertion for abx treatment @ her long term care facility. Pt denies any pain @ this time. Denies chest pain. Denies LOC. Denies SOB. Denies abd pain. Denies fever/chills.     Triage note:  Chief Complaint   Patient presents with    Vascular Access Problem     Pt coming from [facility] to have a PICC line inserted for antibiotics for a UTI. Per EMS [facility] staff state they were instructed by the doctor to send her to [facility] tonight to have this done. Pt is A&O per normal     Review of patient's allergies indicates:   Allergen Reactions    Adhesive Itching and Blisters    Penicillins Anaphylaxis    Tramadol Hives    Avelox [moxifloxacin] Rash     Facial and arm itching and redness. Pt states throat closes when given.    Amoxil [amoxicillin]     Aspridrox [aspirin, buffered]     Codeine Other (See Comments)     Throat swelling    Keflex [cephalexin]      Tolerated cefepime and cefazolin    Norvasc [amlodipine]     Red dye Hives    Robitussin [guaifenesin]     Sulfa (sulfonamide antibiotics)     Tylenol [acetaminophen]      Has reaction to Tylenol with red dye and unable to take Extra Strength Tylenol/ CAN ONLY TOLERATE REG STRENGTH TYLENOL    Vicks vaporub [camphor-eucalyptus oil-menthol]      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

## 2024-01-01 NOTE — ED NOTES
Assumed care of pt at this time. VSS, RR even and unlabored. Resting in bed comfortably. No voiced compaints of pain or discomfort at this time. Safety protocols remain intact.  Patient provided with pillow for comfort.  Patient in gown and on purewick. Report received from ZEINAB Swanson.  Patient updated on plan of care.

## 2024-01-01 NOTE — CONSULTS
[facility] - Emergency Dept  Infectious Disease  Consult Note    Patient Name: Annette Garcia  MRN: [id number]  Admission Date: 1/1/2024  Hospital Length of Stay: 0 days  Attending Physician: Abe Thomas DO  Primary Care Provider: Jose Valles MD     Isolation Status: No active isolations    Patient information was obtained from patient and past medical records.      Inpatient consult to Infectious Diseases  Consult performed by: Soni Horan MD  Consult ordered by: Dorothy Estrella MD        Assessment/Plan:     Renal/  * Acute cystitis without hematuria  88-year-old female with history of SBO s/p colostomy, presented from her nursing home for PICC line for IV antibiotics for MDR Pseudomonas UTI.    Recommendations:  - Obtain cultures from nursing home  - Follow-up urine and blood culture  - Continue cefepime IV      35 minutes of total time spent on the encounter, which includes face to face time and non-face to face time preparing to see the patient (eg, review of tests), obtaining and reviewing separately obtained history, documenting clinical information in the electronic or other health record, independently interpreting results (not separately reported) and communicating results to the patient/family/caregiver, and care coordination (not separately reported).       Thank you for your consult. [facility] team will follow-up with patient. Please contact us if you have any additional questions.    Soni Horan MD  Infectious Disease  [facility] - Emergency Dept    Subjective:     Principal Problem: Acute cystitis without hematuria    HPI: 88-year-old female with history of SBO s/p colostomy, presented from her nursing home for PICC line for IV antibiotics. Per primary team, patient complaining of urinary frequency. Patient denied having any urinary tract symptoms on evaluation. Patient reports feeling as good as she possibly could for her age. Reports having a good appetite. ROS  negative.    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       Past Surgical History:   Procedure Laterality Date    APPENDECTOMY      BACK SURGERY  1988    vertebral fracture    BACK SURGERY  02/2013    lumbar L2-5    CATARACT EXTRACTION, BILATERAL Bilateral     CHOLECYSTECTOMY      open    ESOPHAGOGASTRODUODENOSCOPY N/A 2/15/2023    Procedure: EGD (ESOPHAGOGASTRODUODENOSCOPY);  Surgeon: Keith Chavarria MD;  Location: [facility] ENDO;  Service: Endoscopy;  Laterality: N/A;    EYE SURGERY Bilateral     cataract removal with lens implant    HYSTERECTOMY      PERCUTANEOUS TRANSLUMINAL ANGIOPLASTY (PTA) OF PERIPHERAL VESSEL N/A 2/3/2020    Procedure: PTA, PERIPHERAL VESSEL;  Surgeon: Timmy Simmons MD;  Location: [facility] CATH LAB/EP;  Service: Cardiology;  Laterality: N/A;    PORTACATH PLACEMENT Right 09/2016    RENAL ARTERY STENT Left 07/19/2017    ROBOT-ASSISTED LAPAROSCOPIC REPAIR OF VENTRAL HERNIA N/A 3/23/2023    Procedure: ROBOTIC REPAIR, HERNIA, VENTRAL;  Surgeon: Chris Johnson MD;  Location: [facility] OR;  Service: General;  Laterality: N/A;  Robotic Parastomal hernia repair    SIGMOIDECTOMY  10/29/2019    SMALL INTESTINE SURGERY  08/23/2016    THROMBECTOMY N/A 2/3/2020    Procedure: THROMBECTOMY;  Surgeon: Timmy Simmons MD;  Location: [facility] CATH LAB/EP;  Service: Cardiology;  Laterality: N/A;    TONSILLECTOMY      VAGINAL HYSTERECTOMY W/ ANTERIOR AND POSTERIOR VAGINAL REPAIR         Review of patient's allergies indicates:    Allergen Reactions    Adhesive Itching and Blisters    Penicillins Anaphylaxis    Tramadol Hives    Avelox [moxifloxacin] Rash     Facial and arm itching and redness. Pt states throat closes when given.    Amoxil [amoxicillin]     Aspridrox [aspirin, buffered]     Codeine Other (See Comments)     Throat swelling    Keflex [cephalexin]      Tolerated cefepime and cefazolin    Norvasc [amlodipine]     Red dye Hives    Robitussin [guaifenesin]     Sulfa (sulfonamide antibiotics)     Tylenol [acetaminophen]      Has reaction to Tylenol with red dye and unable to take Extra Strength Tylenol/ CAN ONLY TOLERATE REG STRENGTH TYLENOL    Vicks vaporub [camphor-eucalyptus oil-menthol]        Medications:  (Not in a hospital admission)    Antibiotics (From admission, onward)      Start     Stop Route Frequency Ordered    01/01/24 1900  ceFEPIme (MAXIPIME) 1 g in dextrose 5 % in water (D5W) 100 mL IVPB (MB+)         -- IV Every 12 hours (non-standard times) 01/01/24 0934          Antifungals (From admission, onward)      None          Antivirals (From admission, onward)      None             Immunization History   Administered Date(s) Administered    COVID-19, mRNA, LNP-S, bivalent booster, PF (PFIZER OMICRON) 02/01/2023    Influenza 09/16/2014    Influenza - High Dose - PF (65 years and older) 01/18/2013, 10/04/2013, 09/28/2017, 10/10/2018, 10/18/2019    Influenza - Trivalent - PF (ADULT) 09/16/2014    PPD Test 03/15/2017, 11/07/2019    Pneumococcal Conjugate - 13 Valent 05/25/2016    Pneumococcal Polysaccharide - 23 Valent 08/26/2014, 10/10/2018    Tdap 08/20/2019    Zoster Recombinant 08/20/2019       Family History       Problem Relation (Age of Onset)    Heart attack Father    Hypertension Father    Pancreatic cancer Mother          Social History     Socioeconomic History    Marital status:    Tobacco Use    Smoking status: Never    Smokeless tobacco: Never   Substance and Sexual Activity    Alcohol use: No     Drug use: No    Sexual activity: Not Currently     Partners: Male     Social Determinants of Health     Financial Resource Strain: Low Risk  (12/15/2023)    Overall Financial Resource Strain (CARDIA)     Difficulty of Paying Living Expenses: Not hard at all   Food Insecurity: No Food Insecurity (12/15/2023)    Hunger Vital Sign     Worried About Running Out of Food in the Last Year: Never true     Ran Out of Food in the Last Year: Never true   Transportation Needs: No Transportation Needs (12/15/2023)    PRAPARE - Transportation     Lack of Transportation (Medical): No     Lack of Transportation (Non-Medical): No   Physical Activity: Inactive (12/15/2023)    Exercise Vital Sign     Days of Exercise per Week: 0 days     Minutes of Exercise per Session: 0 min   Stress: No Stress Concern Present (10/13/2022)    [place] [employer] - Occupational Stress Questionnaire     Feeling of Stress : Not at all   Social Connections: Moderately Isolated (12/15/2023)    Social Connection and Isolation Panel [NHANES]     Frequency of Communication with Friends and Family: More than three times a week     Frequency of Social Gatherings with Friends and Family: More than three times a week     Attends Jain Services: More than 4 times per year     Active Member of Clubs or Organizations: No     Attends Club or Organization Meetings: Never     Marital Status:    Housing Stability: Low Risk  (12/15/2023)    Housing Stability Vital Sign     Unable to Pay for Housing in the Last Year: No     Number of Places Lived in the Last Year: 1     Unstable Housing in the Last Year: No     Review of Systems   Constitutional:  Negative for chills, diaphoresis and fever.   HENT:  Negative for rhinorrhea and sore throat.    Respiratory:  Negative for cough and shortness of breath.    Cardiovascular:  Negative for chest pain and leg swelling.   Gastrointestinal:  Negative for abdominal pain, diarrhea, nausea and vomiting.    Genitourinary:  Negative for dysuria and hematuria.   Musculoskeletal:  Negative for arthralgias and myalgias.   Skin:  Negative for rash.   Neurological:  Negative for headaches.     Objective:     Vital Signs (Most Recent):  Temp: 98.3 °F (36.8 °C) (01/01/24 1539)  Pulse: 83 (01/01/24 1539)  Resp: 18 (01/01/24 1539)  BP: 134/74 (01/01/24 1539)  SpO2: 99 % (01/01/24 1539) Vital Signs (24h Range):  Temp:  [97.6 °F (36.4 °C)-98.5 °F (36.9 °C)] 98.3 °F (36.8 °C)  Pulse:  [60-83] 83  Resp:  [16-19] 18  SpO2:  [97 %-99 %] 99 %  BP: (134-166)/(54-74) 134/74        There is no height or weight on file to calculate BMI.    Estimated Creatinine Clearance: 41.4 mL/min (based on SCr of 0.7 mg/dL).     Physical Exam  Constitutional:       General: She is not in acute distress.     Appearance: She is well-developed. She is not diaphoretic.   HENT:      Head: Normocephalic and atraumatic.      Nose: Nose normal.   Eyes:      Conjunctiva/sclera: Conjunctivae normal.   Pulmonary:      Effort: Pulmonary effort is normal. No respiratory distress.   Abdominal:      General: Abdomen is flat. There is no distension.      Comments: RLQ ostomy, red patch under ostomy bag   Musculoskeletal:      Cervical back: Normal range of motion.      Right lower leg: No edema.      Left lower leg: No edema.   Skin:     General: Skin is warm and dry.      Findings: No erythema or rash.   Neurological:      Mental Status: She is alert.   Psychiatric:         Behavior: Behavior normal.          Significant Labs: All pertinent labs within the past 24 hours have been reviewed.    Significant Imaging: I have reviewed all pertinent imaging results/findings within the past 24 hours.

## 2024-01-01 NOTE — PROVIDER PROGRESS NOTES - EMERGENCY DEPT.
Encounter Date: 12/31/2023    ED Physician Progress Notes        ED Resident HAND-OFF NOTE:  Annette Garcia is a 88 y.o. female who presented to the ED on 1/1/2024, patient C/O sent to ED for PICC line for UA concerning for multi-drug resistant pseudamonas and patient has anaphylaxis to Penicillin. Sensitivies uploaded to media tab.    . I assumed care of patient from off-going ED physician team patient pending labs.    On my evaluation, Annette Garcia appears well, hemodynamically stable and in NAD. Thus far, Annette Garcia has received:  Medications   sodium chloride 0.9% flush 5 mL (has no administration in time range)   albuterol-ipratropium 2.5 mg-0.5 mg/3 mL nebulizer solution 3 mL (has no administration in time range)   melatonin tablet 6 mg (has no administration in time range)   prochlorperazine injection Soln 5 mg (has no administration in time range)   naloxone 0.4 mg/mL injection 0.02 mg (has no administration in time range)   ceFEPIme (MAXIPIME) 1 g in dextrose 5 % in water (D5W) 100 mL IVPB (MB+) (has no administration in time range)   ceFEPIme (MAXIPIME) 1 g in dextrose 5 % in water (D5W) 100 mL IVPB (MB+) (0 g Intravenous Stopped 1/1/24 0822)       BP (!) 163/64 (BP Location: Left arm, Patient Position: Lying)   Pulse 69   Temp 98.2 °F (36.8 °C) (Oral)   Resp 16   LMP  (LMP Unknown)   SpO2 97%     8:48 AM  Attempted to call [facility] and was placed on a long hold  PICC nurse communicated to me that they cannot place PICC until blood cultures neg for 48 hours. Blood cultures were sent and patient will require admission    Disposition: I anticipate patient will be admitted.  ______________________  Timo Crowley MD   Emergency Medicine Resident      UPDATE: Patient in NAD, resting in bed. IV cefepime being administered. Outside labwork showing multi-drug resistant pseudomonas. Unable to place PICC line until negative blood cultures at 48 hours. Will admit for IV  antibiotics and PICC placement.        :  UTI (urinary tract infection)

## 2024-01-01 NOTE — ED NOTES
Patient daughter Beatris called and was provided update due to patients wishes.  Patient provided with phone to speak with daughter as well.

## 2024-01-01 NOTE — HPI
88-year-old female with history of SBO s/p colostomy, presented from her nursing home for PICC line for IV antibiotics. Per primary team, patient complaining of urinary frequency. Patient denied having any urinary tract symptoms on evaluation. Patient reports feeling as good as she possibly could for her age. Reports having a good appetite. ROS negative.

## 2024-01-01 NOTE — ASSESSMENT & PLAN NOTE
Patient's anemia is currently controlled. Has not received any PRBCs to date. Current CBC reviewed-   Lab Results   Component Value Date    HGB 11.4 (L) 01/01/2024    HCT 34.3 (L) 01/01/2024     Monitor serial CBC and transfuse if patient becomes hemodynamically unstable, symptomatic or H/H drops below 7/21.

## 2024-01-01 NOTE — SUBJECTIVE & OBJECTIVE
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       Past Surgical History:   Procedure Laterality Date    APPENDECTOMY      BACK SURGERY  1988    vertebral fracture    BACK SURGERY  02/2013    lumbar L2-5    CATARACT EXTRACTION, BILATERAL Bilateral     CHOLECYSTECTOMY      open    ESOPHAGOGASTRODUODENOSCOPY N/A 2/15/2023    Procedure: EGD (ESOPHAGOGASTRODUODENOSCOPY);  Surgeon: Keith Chavarria MD;  Location: [facility] ENDO;  Service: Endoscopy;  Laterality: N/A;    EYE SURGERY Bilateral     cataract removal with lens implant    HYSTERECTOMY      PERCUTANEOUS TRANSLUMINAL ANGIOPLASTY (PTA) OF PERIPHERAL VESSEL N/A 2/3/2020    Procedure: PTA, PERIPHERAL VESSEL;  Surgeon: Timmy Simmons MD;  Location: [facility] CATH LAB/EP;  Service: Cardiology;  Laterality: N/A;    PORTACATH PLACEMENT Right 09/2016    RENAL ARTERY STENT Left 07/19/2017    ROBOT-ASSISTED LAPAROSCOPIC REPAIR OF VENTRAL HERNIA N/A 3/23/2023    Procedure: ROBOTIC REPAIR, HERNIA, VENTRAL;  Surgeon: Chris Johnson MD;  Location: [facility] OR;  Service: General;  Laterality: N/A;  Robotic Parastomal hernia repair    SIGMOIDECTOMY  10/29/2019    SMALL INTESTINE SURGERY  08/23/2016    THROMBECTOMY N/A 2/3/2020    Procedure: THROMBECTOMY;  Surgeon: Timmy Simmons MD;  Location: [facility] CATH LAB/EP;  Service: Cardiology;  Laterality: N/A;    TONSILLECTOMY      VAGINAL HYSTERECTOMY W/ ANTERIOR AND POSTERIOR VAGINAL REPAIR         Review of patient's allergies indicates:   Allergen  Reactions    Adhesive Itching and Blisters    Penicillins Anaphylaxis    Tramadol Hives    Avelox [moxifloxacin] Rash     Facial and arm itching and redness. Pt states throat closes when given.    Amoxil [amoxicillin]     Aspridrox [aspirin, buffered]     Codeine Other (See Comments)     Throat swelling    Keflex [cephalexin]      Tolerated cefepime and cefazolin    Norvasc [amlodipine]     Red dye Hives    Robitussin [guaifenesin]     Sulfa (sulfonamide antibiotics)     Tylenol [acetaminophen]      Has reaction to Tylenol with red dye and unable to take Extra Strength Tylenol/ CAN ONLY TOLERATE REG STRENGTH TYLENOL    Vicks vaporub [camphor-eucalyptus oil-menthol]        Medications:  (Not in a hospital admission)    Antibiotics (From admission, onward)      Start     Stop Route Frequency Ordered    01/01/24 1900  ceFEPIme (MAXIPIME) 1 g in dextrose 5 % in water (D5W) 100 mL IVPB (MB+)         -- IV Every 12 hours (non-standard times) 01/01/24 0934          Antifungals (From admission, onward)      None          Antivirals (From admission, onward)      None             Immunization History   Administered Date(s) Administered    COVID-19, mRNA, LNP-S, bivalent booster, PF (PFIZER OMICRON) 02/01/2023    Influenza 09/16/2014    Influenza - High Dose - PF (65 years and older) 01/18/2013, 10/04/2013, 09/28/2017, 10/10/2018, 10/18/2019    Influenza - Trivalent - PF (ADULT) 09/16/2014    PPD Test 03/15/2017, 11/07/2019    Pneumococcal Conjugate - 13 Valent 05/25/2016    Pneumococcal Polysaccharide - 23 Valent 08/26/2014, 10/10/2018    Tdap 08/20/2019    Zoster Recombinant 08/20/2019       Family History       Problem Relation (Age of Onset)    Heart attack Father    Hypertension Father    Pancreatic cancer Mother          Social History     Socioeconomic History    Marital status:    Tobacco Use    Smoking status: Never    Smokeless tobacco: Never   Substance and Sexual Activity    Alcohol use: No    Drug use: No     Sexual activity: Not Currently     Partners: Male     Social Determinants of Health     Financial Resource Strain: Low Risk  (12/15/2023)    Overall Financial Resource Strain (CARDIA)     Difficulty of Paying Living Expenses: Not hard at all   Food Insecurity: No Food Insecurity (12/15/2023)    Hunger Vital Sign     Worried About Running Out of Food in the Last Year: Never true     Ran Out of Food in the Last Year: Never true   Transportation Needs: No Transportation Needs (12/15/2023)    PRAPARE - Transportation     Lack of Transportation (Medical): No     Lack of Transportation (Non-Medical): No   Physical Activity: Inactive (12/15/2023)    Exercise Vital Sign     Days of Exercise per Week: 0 days     Minutes of Exercise per Session: 0 min   Stress: No Stress Concern Present (10/13/2022)    [employer] - Occupational Stress Questionnaire     Feeling of Stress : Not at all   Social Connections: Moderately Isolated (12/15/2023)    Social Connection and Isolation Panel [NHANES]     Frequency of Communication with Friends and Family: More than three times a week     Frequency of Social Gatherings with Friends and Family: More than three times a week     Attends Buddhist Services: More than 4 times per year     Active Member of Clubs or Organizations: No     Attends Club or Organization Meetings: Never     Marital Status:    Housing Stability: Low Risk  (12/15/2023)    Housing Stability Vital Sign     Unable to Pay for Housing in the Last Year: No     Number of Places Lived in the Last Year: 1     Unstable Housing in the Last Year: No     Review of Systems   Constitutional:  Negative for chills, diaphoresis and fever.   HENT:  Negative for rhinorrhea and sore throat.    Respiratory:  Negative for cough and shortness of breath.    Cardiovascular:  Negative for chest pain and leg swelling.   Gastrointestinal:  Negative for abdominal pain, diarrhea, nausea and vomiting.    Genitourinary:  Negative for dysuria and hematuria.   Musculoskeletal:  Negative for arthralgias and myalgias.   Skin:  Negative for rash.   Neurological:  Negative for headaches.     Objective:     Vital Signs (Most Recent):  Temp: 98.3 °F (36.8 °C) (01/01/24 1539)  Pulse: 83 (01/01/24 1539)  Resp: 18 (01/01/24 1539)  BP: 134/74 (01/01/24 1539)  SpO2: 99 % (01/01/24 1539) Vital Signs (24h Range):  Temp:  [97.6 °F (36.4 °C)-98.5 °F (36.9 °C)] 98.3 °F (36.8 °C)  Pulse:  [60-83] 83  Resp:  [16-19] 18  SpO2:  [97 %-99 %] 99 %  BP: (134-166)/(54-74) 134/74        There is no height or weight on file to calculate BMI.    Estimated Creatinine Clearance: 41.4 mL/min (based on SCr of 0.7 mg/dL).     Physical Exam  Constitutional:       General: She is not in acute distress.     Appearance: She is well-developed. She is not diaphoretic.   HENT:      Head: Normocephalic and atraumatic.      Nose: Nose normal.   Eyes:      Conjunctiva/sclera: Conjunctivae normal.   Pulmonary:      Effort: Pulmonary effort is normal. No respiratory distress.   Abdominal:      General: Abdomen is flat. There is no distension.      Comments: RLQ ostomy, red patch under ostomy bag   Musculoskeletal:      Cervical back: Normal range of motion.      Right lower leg: No edema.      Left lower leg: No edema.   Skin:     General: Skin is warm and dry.      Findings: No erythema or rash.   Neurological:      Mental Status: She is alert.   Psychiatric:         Behavior: Behavior normal.          Significant Labs: All pertinent labs within the past 24 hours have been reviewed.    Significant Imaging: I have reviewed all pertinent imaging results/findings within the past 24 hours.

## 2024-01-01 NOTE — ED PROVIDER NOTES
Encounter Date: 12/31/2023       History     Chief Complaint   Patient presents with    Vascular Access Problem     Pt coming from [facility] to have a PICC line inserted for antibiotics for a UTI. Per EMS NH staff state they were instructed by the doctor to send her to [facility] tonight to have this done. Pt is A&O per normal     88-year-old female presents from nursing [place] with concerns of PICC line placement.  Patient had a recent urinalysis that was positive and culture showed sensitivities needing intravenous antibiotics.  Nursing home sent the patient here for a PICC line.  Patient endorses urinary frequency, dysuria and nausea without vomiting.      \  Review of patient's allergies indicates:   Allergen Reactions    Adhesive Itching and Blisters    Penicillins Anaphylaxis    Tramadol Hives    Avelox [moxifloxacin] Rash     Facial and arm itching and redness. Pt states throat closes when given.    Amoxil [amoxicillin]     Aspridrox [aspirin, buffered]     Codeine Other (See Comments)     Throat swelling    Keflex [cephalexin]      Tolerated cefepime and cefazolin    Norvasc [amlodipine]     Red dye Hives    Robitussin [guaifenesin]     Sulfa (sulfonamide antibiotics)     Tylenol [acetaminophen]      Has reaction to Tylenol with red dye and unable to take Extra Strength Tylenol/ CAN ONLY TOLERATE REG STRENGTH TYLENOL    Vicks vaporub [camphor-eucalyptus oil-menthol]      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     Past  Surgical History:   Procedure Laterality Date    APPENDECTOMY      BACK SURGERY  1988    vertebral fracture    BACK SURGERY  02/2013    lumbar L2-5    CATARACT EXTRACTION, BILATERAL Bilateral     CHOLECYSTECTOMY      open    ESOPHAGOGASTRODUODENOSCOPY N/A 2/15/2023    Procedure: EGD (ESOPHAGOGASTRODUODENOSCOPY);  Surgeon: Keith Chavarria MD;  Location: [facility] ENDO;  Service: Endoscopy;  Laterality: N/A;    EYE SURGERY Bilateral     cataract removal with lens implant    HYSTERECTOMY      PERCUTANEOUS TRANSLUMINAL ANGIOPLASTY (PTA) OF PERIPHERAL VESSEL N/A 2/3/2020    Procedure: PTA, PERIPHERAL VESSEL;  Surgeon: Timmy Simmons MD;  Location: [facility] CATH LAB/EP;  Service: Cardiology;  Laterality: N/A;    PORTACATH PLACEMENT Right 09/2016    RENAL ARTERY STENT Left 07/19/2017    ROBOT-ASSISTED LAPAROSCOPIC REPAIR OF VENTRAL HERNIA N/A 3/23/2023    Procedure: ROBOTIC REPAIR, HERNIA, VENTRAL;  Surgeon: Chris Johnson MD;  Location: [facility] OR;  Service: General;  Laterality: N/A;  Robotic Parastomal hernia repair    SIGMOIDECTOMY  10/29/2019    SMALL INTESTINE SURGERY  08/23/2016    THROMBECTOMY N/A 2/3/2020    Procedure: THROMBECTOMY;  Surgeon: Timmy Simmons MD;  Location: [facility] CATH LAB/EP;  Service: Cardiology;  Laterality: N/A;    TONSILLECTOMY      VAGINAL HYSTERECTOMY W/ ANTERIOR AND POSTERIOR VAGINAL REPAIR       Family History   Problem Relation Age of Onset    Pancreatic cancer Mother     Hypertension Father     Heart attack Father      Social History     Tobacco Use    Smoking status: Never    Smokeless tobacco: Never   Substance Use Topics    Alcohol use: No    Drug use: No     Review of Systems    Physical Exam     Initial Vitals   BP Pulse Resp Temp SpO2   12/31/23 2023 12/31/23 2023 12/31/23 2023 12/31/23 2026 12/31/23 2023   (!) 143/54 60 16 97.6 °F (36.4 °C) 98 %      MAP       --                Physical Exam    Nursing note and vitals reviewed.  Constitutional: She appears well-developed and  well-nourished. She is not diaphoretic. No distress.   HENT:   Head: Normocephalic and atraumatic.   Eyes: EOM are normal. Pupils are equal, round, and reactive to light.   Neck: No tracheal deviation present.   Normal range of motion.  Cardiovascular:  Normal rate, regular rhythm, normal heart sounds and intact distal pulses.     Exam reveals no gallop and no friction rub.       No murmur heard.  Pulmonary/Chest: Breath sounds normal. No stridor. No respiratory distress. She has no wheezes. She has no rhonchi. She has no rales.   Abdominal: Abdomen is soft. Bowel sounds are normal. She exhibits no distension and no mass. There is no abdominal tenderness. There is no rebound and no guarding.   Musculoskeletal:         General: No edema. Normal range of motion.      Cervical back: Normal range of motion.     Neurological: She is alert and oriented to person, place, and time. GCS score is 15. GCS eye subscore is 4. GCS verbal subscore is 5. GCS motor subscore is 6.   Skin: Skin is warm and dry. Capillary refill takes less than 2 seconds.   Psychiatric: She has a normal mood and affect. Thought content normal.         ED Course   Procedures  Labs Reviewed   CBC W/ AUTO DIFFERENTIAL - Abnormal; Notable for the following components:       Result Value    RBC 3.48 (*)     Hemoglobin 11.4 (*)     Hematocrit 34.3 (*)     MCV 99 (*)     MCH 32.8 (*)     All other components within normal limits   COMPREHENSIVE METABOLIC PANEL - Abnormal; Notable for the following components:    Sodium 133 (*)     All other components within normal limits   URINALYSIS, REFLEX TO URINE CULTURE - Abnormal; Notable for the following components:    Appearance, UA Hazy (*)     Nitrite, UA Positive (*)     Leukocytes, UA 3+ (*)     All other components within normal limits    Narrative:     Specimen Source->Urine   CULTURE, URINE   URINALYSIS MICROSCOPIC    Narrative:     Specimen Source->Urine        ECG Results              EKG 12-lead (Final  result)  Result time 01/02/24 14:06:23      Final result by Interface, Lab In [facility] (01/02/24 14:06:23)                   Narrative:    Test Reason : I48.91,    Vent. Rate : 069 BPM     Atrial Rate : 069 BPM     P-R Int : 158 ms          QRS Dur : 084 ms      QT Int : 376 ms       P-R-T Axes : 054 016 061 degrees     QTc Int : 402 ms    Normal sinus rhythm  Low voltage QRS  Borderline Abnormal ECG  When compared with ECG of 17-DEC-2023 09:35,  NSR now and rate has slowed  ST no longer depressed in Inferior leads  ST no longer depressed in Lateral leads  T wave inversion no longer evident in Lateral leads  Confirmed by Maykel BRAGG MD ([id number]) on 1/1/2024 10:00:36 PM    Referred By: [email or web address]   SELF           Confirmed By:Maykel BRAGG MD                                  Imaging Results    None          Medications   ceFEPIme (MAXIPIME) 1 g in dextrose 5 % in water (D5W) 100 mL IVPB (MB+) (0 g Intravenous Stopped 1/1/24 0822)     Medical Decision Making  Hemodynamically stable. Afebrile. Phonating and protecting the airway spontaneously. No clinical evidence for cardiovascular instability or impending airway compromise. Examination as above. Additional historians include EMS. Prior medical records reviewed.  Recent PMD note review, patient has a history of E coli bacteremia as well as atrial fibrillation. Current co-morbidities considered that will impact clinical decision making include as above.    Plan:  Unclear history.  Attempted to discuss the case with the on-call physician at the nursing home however none were available.  Asked regarding need for PICC line placement emergently however was unable to talk to a doctor who prescribed this or the doctor on-call.  Due to unclear presentation, safety for the patient, will obtain workup here implant for admission due to culture data.      Amount and/or Complexity of Data Reviewed  Labs: ordered.    Risk  Decision regarding hospitalization.               Critical care time  spent on the evaluation and treatment of severe organ dysfunction, review of pertinent labs and imaging studies, discussions with consulting providers and discussions with patient/family: 60 minutes..                          Clinical Impression:  Final diagnoses:  [N39.0] UTI (urinary tract infection)          ED Disposition Condition    Admit                 Ahmet Deleon MD  01/10/24 0931

## 2024-01-01 NOTE — ED NOTES
Annette Garcia, a 88 y.o. female presents to the ED w/ complaint of needing PICC line.  Patient diagnosed with UTI at [facility] and told she needed IV abx.  Patient has no other complaints at this time.      Triage note:  Chief Complaint   Patient presents with    Vascular Access Problem     Pt coming from [facility] to have a PICC line inserted for antibiotics for a UTI. Per EMS NH staff state they were instructed by the doctor to send her to [facility] tonight to have this done. Pt is A&O per normal     Review of patient's allergies indicates:   Allergen Reactions    Adhesive Itching and Blisters    Penicillins Anaphylaxis    Tramadol Hives    Avelox [moxifloxacin] Rash     Facial and arm itching and redness. Pt states throat closes when given.    Amoxil [amoxicillin]     Aspridrox [aspirin, buffered]     Codeine Other (See Comments)     Throat swelling    Keflex [cephalexin]      Tolerated cefepime and cefazolin    Norvasc [amlodipine]     Red dye Hives    Robitussin [guaifenesin]     Sulfa (sulfonamide antibiotics)     Tylenol [acetaminophen]      Has reaction to Tylenol with red dye and unable to take Extra Strength Tylenol/ CAN ONLY TOLERATE REG STRENGTH TYLENOL    Vicks vaporub [camphor-eucalyptus oil-menthol]      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

## 2024-01-02 LAB
ANION GAP SERPL CALC-SCNC: 10 MMOL/L (ref 8–16)
BUN SERPL-MCNC: 14 MG/DL (ref 8–23)
CALCIUM SERPL-MCNC: 9.3 MG/DL (ref 8.7–10.5)
CHLORIDE SERPL-SCNC: 98 MMOL/L (ref 95–110)
CO2 SERPL-SCNC: 26 MMOL/L (ref 23–29)
CREAT SERPL-MCNC: 0.8 MG/DL (ref 0.5–1.4)
ERYTHROCYTE [DISTWIDTH] IN BLOOD BY AUTOMATED COUNT: 13.7 % (ref 11.5–14.5)
EST. GFR  (NO RACE VARIABLE): >60 ML/MIN/1.73 M^2
GLUCOSE SERPL-MCNC: 87 MG/DL (ref 70–110)
HCT VFR BLD AUTO: 35.3 % (ref 37–48.5)
HGB BLD-MCNC: 11.6 G/DL (ref 12–16)
MAGNESIUM SERPL-MCNC: 1.8 MG/DL (ref 1.6–2.6)
MCH RBC QN AUTO: 32.7 PG (ref 27–31)
MCHC RBC AUTO-ENTMCNC: 32.9 G/DL (ref 32–36)
MCV RBC AUTO: 99 FL (ref 82–98)
PLATELET # BLD AUTO: 200 K/UL (ref 150–450)
PMV BLD AUTO: 10 FL (ref 9.2–12.9)
POTASSIUM SERPL-SCNC: 4.3 MMOL/L (ref 3.5–5.1)
RBC # BLD AUTO: 3.55 M/UL (ref 4–5.4)
SODIUM SERPL-SCNC: 134 MMOL/L (ref 136–145)
WBC # BLD AUTO: 4.25 K/UL (ref 3.9–12.7)

## 2024-01-02 PROCEDURE — 80048 BASIC METABOLIC PNL TOTAL CA: CPT

## 2024-01-02 PROCEDURE — 85027 COMPLETE CBC AUTOMATED: CPT

## 2024-01-02 PROCEDURE — 63600175 PHARM REV CODE 636 W HCPCS

## 2024-01-02 PROCEDURE — 83735 ASSAY OF MAGNESIUM: CPT

## 2024-01-02 PROCEDURE — 99233 SBSQ HOSP IP/OBS HIGH 50: CPT | Mod: ,,, | Performed by: STUDENT IN AN ORGANIZED HEALTH CARE EDUCATION/TRAINING PROGRAM

## 2024-01-02 PROCEDURE — 12000002 HC ACUTE/MED SURGE SEMI-PRIVATE ROOM

## 2024-01-02 PROCEDURE — 25000003 PHARM REV CODE 250

## 2024-01-02 PROCEDURE — 36415 COLL VENOUS BLD VENIPUNCTURE: CPT

## 2024-01-02 RX ADMIN — APIXABAN 2.5 MG: 2.5 TABLET, FILM COATED ORAL at 09:01

## 2024-01-02 RX ADMIN — APIXABAN 2.5 MG: 2.5 TABLET, FILM COATED ORAL at 08:01

## 2024-01-02 RX ADMIN — DOCUSATE SODIUM 100 MG: 100 CAPSULE, LIQUID FILLED ORAL at 08:01

## 2024-01-02 RX ADMIN — POLYETHYLENE GLYCOL 3350 17 G: 17 POWDER, FOR SOLUTION ORAL at 09:01

## 2024-01-02 RX ADMIN — PRAVASTATIN SODIUM 20 MG: 20 TABLET ORAL at 09:01

## 2024-01-02 RX ADMIN — POTASSIUM CHLORIDE 20 MEQ: 1500 TABLET, EXTENDED RELEASE ORAL at 09:01

## 2024-01-02 RX ADMIN — CEFEPIME 1 G: 1 INJECTION, POWDER, FOR SOLUTION INTRAMUSCULAR; INTRAVENOUS at 06:01

## 2024-01-02 RX ADMIN — LEVOTHYROXINE SODIUM 125 MCG: 25 TABLET ORAL at 06:01

## 2024-01-02 RX ADMIN — LOSARTAN POTASSIUM 25 MG: 25 TABLET, FILM COATED ORAL at 09:01

## 2024-01-02 RX ADMIN — PHENOBARBITAL 97.2 MG: 32.4 TABLET ORAL at 08:01

## 2024-01-02 RX ADMIN — CHOLECALCIFEROL TAB 25 MCG (1000 UNIT) 1000 UNITS: 25 TAB at 09:01

## 2024-01-02 RX ADMIN — FUROSEMIDE 20 MG: 20 TABLET ORAL at 09:01

## 2024-01-02 RX ADMIN — METOPROLOL TARTRATE 25 MG: 25 TABLET, FILM COATED ORAL at 09:01

## 2024-01-02 RX ADMIN — Medication 6 MG: at 08:01

## 2024-01-02 RX ADMIN — PANTOPRAZOLE SODIUM 40 MG: 40 TABLET, DELAYED RELEASE ORAL at 09:01

## 2024-01-02 RX ADMIN — METOPROLOL TARTRATE 25 MG: 25 TABLET, FILM COATED ORAL at 08:01

## 2024-01-02 RX ADMIN — DOCUSATE SODIUM 100 MG: 100 CAPSULE, LIQUID FILLED ORAL at 09:01

## 2024-01-02 NOTE — PROGRESS NOTES
01/01/24 2126   Vital Signs   Temp 98.3 °F (36.8 °C)   Temp Source Oral   Pulse 77   Heart Rate Source Monitor   Resp 18   SpO2 95 %   BP (!) 149/58   MAP (mmHg) 84   BP Location Left arm   Patient Position Lying     Pt arrived to unit via bed. Pt AAOx4 , pleasant and in no distress. Pt denied any pain or discomfort. POC reviewed with pt . Pt oriented to room and call light. Pt belongings at bedside. Pt freshly clean with new linen and gown and new pure wick placed. Pt skin intact just blanchable redness to buttocks, skin tear to lt lower arm and some bruising noted on arms. Safety maintained

## 2024-01-02 NOTE — ASSESSMENT & PLAN NOTE
I have reviewed hospital notes from   service and other specialty providers. I have also reviewed CBC, CMP/BMP,  cultures and imaging with my interpretation as documented.      88-year-old female with history of SBO s/p colostomy, presented from her nursing home for PICC line for IV antibiotics for MDR Pseudomonas UTI. Per NH records in media tab; Ucx (12/28) +PSA (R-FQ). Reportedly, pt initially endorsed having some urge/freq, and dysuria; but during interview today, pt denies urinary sxs, flank pain, fever, N/V, or new / worsening abd pain. On arrival, UA not c/f UTI (wbc 5), occasional bacteria. Ucx+GNR (ID/susc pending). Bld cxs NGTD. Pt maintained on Iv-cefepime since 01/01; pt reports improving overall.       Recommendations / Plan:  Continue Iv-cefepime, to complete 3d course for uncomplicated UTI. PATRICIO 01/03. Last doses to be given tomorrow.  If recurrent urinary sxs or UTIs, consider urology eval outpt, as well as D-mannose, probiotic, and topical vaginal estrogen -- and for diaper irritant dermatitis to use topical cream as needed and change soaked diapers ASAP at SNF.  Pt does not need ID f/u appt scheduled at this time, but may f/u as needed.       -- ID will schedule pt for ID clinic f/u appt   -- Discussed with ID staff and primary team.  -- ID will sign off at this time.

## 2024-01-02 NOTE — SUBJECTIVE & OBJECTIVE
Interval History: NAEON. Pt remains AF, VSS, HDS, wbc nml. Maintained on cefepime since 01/01. UA not c/f UTI, wbc 5. Ucx +GNR. Ucx from NH (12/28) +PSA (R-FQ). Ostomy seal came loose today, with stool leaking. Otherwise, no acute issues.     Review of Systems   Constitutional:  Negative for chills, diaphoresis and fever.   HENT:  Negative for rhinorrhea and sore throat.    Eyes:  Negative for pain.   Respiratory:  Negative for cough and shortness of breath.    Cardiovascular:  Negative for chest pain and leg swelling.   Gastrointestinal:  Positive for abdominal pain (stable, mild at ostomy site.). Negative for diarrhea, nausea and vomiting.   Genitourinary:  Negative for dysuria, flank pain and hematuria.   Musculoskeletal:  Negative for arthralgias and myalgias.   Skin:  Negative for rash.   Neurological:  Negative for headaches.   Psychiatric/Behavioral:  Negative for behavioral problems and confusion.          Objective:     Vital Signs (Most Recent):  Temp: 98.1 °F (36.7 °C) (01/02/24 1229)  Pulse: 67 (01/02/24 1229)  Resp: 17 (01/02/24 1229)  BP: (!) 145/67 (01/02/24 1229)  SpO2: 100 % (01/02/24 1229) Vital Signs (24h Range):  Temp:  [96.7 °F (35.9 °C)-98.3 °F (36.8 °C)] 98.1 °F (36.7 °C)  Pulse:  [63-86] 67  Resp:  [17-18] 17  SpO2:  [95 %-100 %] 100 %  BP: (113-162)/(58-70) 145/67        There is no height or weight on file to calculate BMI.    Estimated Creatinine Clearance: 36.3 mL/min (based on SCr of 0.8 mg/dL).     Physical Exam  Constitutional:       General: She is not in acute distress.     Appearance: She is well-developed. She is not diaphoretic.   HENT:      Head: Normocephalic and atraumatic.      Nose: Nose normal.   Eyes:      Conjunctiva/sclera: Conjunctivae normal.   Cardiovascular:      Heart sounds: Normal heart sounds. No murmur heard.  Pulmonary:      Effort: Pulmonary effort is normal. No respiratory distress.   Abdominal:      General: Abdomen is flat. There is no distension.       Tenderness: There is no abdominal tenderness.      Comments: RLQ ostomy, leaking stool today 01/02 on exam.    Genitourinary:     Comments: Adult diaper in place.  Musculoskeletal:         General: No swelling or tenderness.      Cervical back: Normal range of motion. No rigidity or tenderness.      Right lower leg: No edema.      Left lower leg: No edema.   Skin:     General: Skin is warm and dry.      Findings: No erythema or rash.   Neurological:      Mental Status: She is alert. Mental status is at baseline.   Psychiatric:         Behavior: Behavior normal.         Thought Content: Thought content normal.          Significant Labs: Blood Culture:   Recent Labs   Lab 01/01/24 0715 01/01/24 0716   LABBLOO No Growth to date  No Growth to date No Growth to date  No Growth to date     CBC:   Recent Labs   Lab 01/01/24 0716 01/02/24 0352   WBC 4.01 4.25   HGB 11.4* 11.6*   HCT 34.3* 35.3*    200     CMP:   Recent Labs   Lab 01/01/24 0716 01/02/24  0352   * 134*   K 4.3 4.3   CL 99 98   CO2 25 26   GLU 96 87   BUN 16 14   CREATININE 0.7 0.8   CALCIUM 9.4 9.3   PROT 6.7  --    ALBUMIN 3.7  --    BILITOT 0.4  --    ALKPHOS 87  --    AST 16  --    ALT 10  --    ANIONGAP 9 10     Microbiology Results (last 7 days)       Procedure Component Value Units Date/Time    Blood Culture #2 **CANNOT BE ORDERED STAT** [[id number]] Collected: 01/01/24 0715    Order Status: Completed Specimen: Blood from Peripheral, Wrist, Right Updated: 01/02/24 1012     Blood Culture, Routine No Growth to date      No Growth to date    Blood Culture #1 **CANNOT BE ORDERED STAT** [[id number]] Collected: 01/01/24 0716    Order Status: Completed Specimen: Blood from Peripheral, Hand, Right Updated: 01/02/24 1012     Blood Culture, Routine No Growth to date      No Growth to date    Urine culture [[id number]]  (Abnormal) Collected: 01/01/24 0736    Order Status: Completed Specimen: Urine from Clean Catch Updated: 01/02/24 0830      Urine Culture, Routine GRAM NEGATIVE SAMEERA  >100,000 cfu/ml  Identification and susceptibility pending      Narrative:      Specimen Source->Urine    add on CXURN per Maddy Cota PA-C  01/01/2024  09:18     Urine culture [[id number]]     Order Status: Completed Specimen: Urine, Clean Catch           Urine Culture:   Recent Labs   Lab 01/01/24  0736   LABURIN GRAM NEGATIVE SAMEERA  >100,000 cfu/ml  Identification and susceptibility pending  *     Urine Studies:   Recent Labs   Lab 01/01/24  0736   COLORU Yellow   APPEARANCEUA Hazy*   PHUR 7.0   SPECGRAV 1.015   PROTEINUA Negative   GLUCUA Negative   KETONESU Negative   BILIRUBINUA Negative   OCCULTUA Negative   NITRITE Positive*   LEUKOCYTESUR 3+*   RBCUA 1   WBCUA 5   BACTERIA Occasional   SQUAMEPITHEL 1     All pertinent labs within the past 24 hours have been reviewed.    Significant Imaging: I have reviewed all pertinent imaging results/findings within the past 24 hours.    I have personally reviewed records / hospital notes from   service and other specialty providers. I have also reviewed CBC, CMP/BMP,  cultures and imaging with my interpretation as documented in my assessment / plan.    Patient is high risk for infectious complications given pt's age, multiple co-morbidities, and case complexity.      Time: 50 minutes   50% of time spent on face-to-face counseling and coordination of care. Counseling included review of test results, diagnosis, and treatment plan with patient and/or family.

## 2024-01-02 NOTE — ASSESSMENT & PLAN NOTE
87 yo F with PMHx of chronic DVT, A fib, and large B cell lymphoma who presented to ED from [facility] for a PICC line placement. Recent urine cx at her NH which showed multiresistant pseudomonas requiring IV abx. UA with positive leuks and nitrites on admit.     - started on cefepime in ED; will continue  - PICC team consulted and requesting 48 hr of negative blood cx  - blood cx pending  - new urine cx pending   - ID consulted;  - discussed possibility of using one time gentamicin; ID will follow up.  - Continue cefepime for a total of 3 days inpatient. Follow up with urology output.

## 2024-01-02 NOTE — NURSING
Pt had an uneventful night . VSS. Pt is free of falls and injuries. Bed in lowest position and call light within reach

## 2024-01-02 NOTE — NURSING
.Nurses Note -- 4 Eyes      1/1.24  22:00      Skin assessed during: Admit      [] No Altered Skin Integrity Present    []Prevention Measures Documented      [x] Yes- Altered Skin Integrity Present or Discovered   [] LDA Added if Not in Epic (Describe Wound)   [x] New Altered Skin Integrity was Present on Admit and Documented in LDA   [] Wound Image Taken    Wound Care Consulted? yes    Attending Nurse:  Stephanie Rubalcava RN/Staff Member:  Tiffany Brown RN       Pt has blanchable redness to buttocks. She has a lt forearm skin tear. She also has some bruising

## 2024-01-02 NOTE — PLAN OF CARE
Problem: Adult Inpatient Plan of Care  Goal: Plan of Care Review  Outcome: Ongoing, Progressing  Flowsheets (Taken 1/2/2024 1626)  Plan of Care Reviewed With: patient  Goal: Patient-Specific Goal (Individualized)  Outcome: Ongoing, Progressing     Problem: Infection  Goal: Absence of Infection Signs and Symptoms  Outcome: Ongoing, Progressing     Problem: Fall Injury Risk  Goal: Absence of Fall and Fall-Related Injury  Outcome: Ongoing, Progressing  Intervention: Identify and Manage Contributors  Flowsheets (Taken 1/2/2024 1626)  Self-Care Promotion: BADL personal objects within reach  Medication Review/Management: medications reviewed     Problem: Skin Injury Risk Increased  Goal: Skin Health and Integrity  Outcome: Ongoing, Progressing  Intervention: Optimize Skin Protection  Flowsheets (Taken 1/2/2024 1626)  Pressure Reduction Techniques:   frequent weight shift encouraged   weight shift assistance provided  Head of Bed (HOB) Positioning: HOB elevated  AAOX4.  IV abx administered and scheduled meds given, no complaints during this shift, vitals signs stables No acute event noted during this shift. Colostomy care performed and bag changed, fall risk reviewed and comfort measure utilized, call light within reach, instructed the pt to call for assistance if needed, pt verbalized understanding. Plan of care ongoing.

## 2024-01-02 NOTE — PLAN OF CARE
"[facility] ([place] [address])  Initial Discharge Assessment       Primary Care Provider: Jose Valles MD    Admission Diagnosis: Atrial fibrillation [I48.91]  UTI (urinary tract infection) [N39.0]  Chest pain [R07.9]    Admission Date: 1/1/2024  Expected Discharge Date: 1/3/2024    Transition of Care Barriers: (P) None    Payor: MEDICARE / Plan: MEDICARE PART A & B / Product Type: Government /     Extended Emergency Contact Information  Primary Emergency Contact: Beatris Richey   [place]  Work Phone: [phone]  Mobile Phone: [phone]  Relation: Daughter  Secondary Emergency Contact: Lorenza Richey   [facility]  Home Phone: [phone]  Mobile Phone: [phone]  Relation: Daughter    Discharge Plan A: (P) Return to nursing home  Discharge Plan B: (P) New Nursing Home placement - penitentiary care facility      [facility] - [place], [place] - [address] [address]  [address] [address]  [facility] [address]  Phone: [phone] Fax: [phone]    [facility] - [place], [place] - [address]  [address] [address]  Phone: [phone] Fax: [phone]      Initial Assessment (most recent)       Adult Discharge Assessment - 01/02/24 1409          Discharge Assessment    Assessment Type Discharge Planning Assessment (P)      Confirmed/corrected address, phone number and insurance Yes (P)      Confirmed Demographics Correct on Facesheet (P)      Source of Information family (P)      If unable to respond/provide information was family/caregiver contacted? Yes (P)      Contact Name/Number Beatris Richey (Daughter) [phone] (P)      Communicated JESSICA with patient/caregiver Yes (P)      Reason For Admission "needed a picc line put in for uti" (P)      People in Home facility resident (P)      Facility Arrived From: [facility] (P)      Do you expect to return to your current living situation? Yes (P)      Do you have help at home or someone " to help you manage your care at home? Yes (P)      Who are your caregiver(s) and their phone number(s)? [facility] staff (P)      Prior to hospitilization cognitive status: Alert/Oriented (P)      Walking or Climbing Stairs Difficulty yes (P)      Walking or Climbing Stairs ambulation difficulty, dependent;transferring difficulty, dependent (P)      Mobility Management wheelchair (P)      Dressing/Bathing Difficulty yes (P)      Dressing/Bathing bathing difficulty, dependent (P)      Dressing/Bathing Management [facility] staff assist w/ bathing/dressing (P)      Home Accessibility wheelchair accessible (P)      Home Layout Able to live on 1st floor (P)      Equipment Currently Used at Home wheelchair (P)      Readmission within 30 days? Yes (P)      Patient currently being followed by outpatient case management? No (P)      Do you currently have service(s) that help you manage your care at home? Yes (P)      Name and Contact number of agency [facility] staff (P)      Is the pt/caregiver preference to resume services with current agency Yes (P)      Do you take prescription medications? Yes (P)      Do you have prescription coverage? Yes (P)      Coverage MEDICARE - MEDICARE PART A & B - (P)      Do you have any problems affording any of your prescribed medications? No (P)      Is the patient taking medications as prescribed? yes (P)      Who is going to help you get home at discharge? Beatris Richey (Daughter) [phone] (P)      How do you get to doctors appointments? other (see comments) (P)    [facility] staff    Are you on dialysis? No (P)      Do you take coumadin? No (P)      Discharge Plan A Return to nursing home (P)      Discharge Plan B New Nursing Home placement - senior living care facility (P)      DME Needed Upon Discharge  none (P)      Discharge Plan discussed with: Adult children (P)      Transition of Care Barriers None (P)         Physical Activity    On average, how many days per week do you engage in  moderate to strenuous exercise (like a brisk walk)? 0 days (P)      On average, how many minutes do you engage in exercise at this level? 0 min (P)         Financial Resource Strain    How hard is it for you to pay for the very basics like food, housing, medical care, and heating? Not hard at all (P)         Housing Stability    In the last 12 months, was there a time when you were not able to pay the mortgage or rent on time? No (P)      In the last 12 months, how many places have you lived? 1 (P)      In the last 12 months, was there a time when you did not have a steady place to sleep or slept in a shelter (including now)? No (P)         Transportation Needs    In the past 12 months, has lack of transportation kept you from medical appointments or from getting medications? No (P)      In the past 12 months, has lack of transportation kept you from meetings, work, or from getting things needed for daily living? No (P)         Food Insecurity    Within the past 12 months, you worried that your food would run out before you got the money to buy more. Never true (P)      Within the past 12 months, the food you bought just didn't last and you didn't have money to get more. Never true (P)         Social Connections    In a typical week, how many times do you talk on the phone with family, friends, or neighbors? More than three times a week (P)      How often do you get together with friends or relatives? More than three times a week (P)      How often do you attend Sikh or Spiritism services? Never (P)      Do you belong to any clubs or organizations such as Sikh groups, unions, fraternal or athletic groups, or school groups? No (P)      Are you , , , , never , or living with a partner?  (P)         Alcohol Use    Q1: How often do you have a drink containing alcohol? Never (P)      Q2: How many drinks containing alcohol do you have on a typical day when you are drinking?  "Patient does not drink (P)      Q3: How often do you have six or more drinks on one occasion? Never (P)         OTHER    Name(s) of People in Home [facility] residents (P)                      Readmission Assessment (most recent)       Readmission Assessment - 01/02/24 1407          Readmission    Why were you hospitalized in the last 30 days? "bowel obstruction"     Why were you readmitted? Alarmed about signs/symptoms;New medical problem     When you left the hospital how did you feel? "felt much better"     When you left the hospital where did you go? Nursing Home     Did patient/caregiver refused recommended DC plan? No     Tell me about what happened between when you left the hospital and the day you returned. Dischaged to [facility], was seen by NH MD about concern for UTI, NH MD advised patient to admit to hospital to have PICC line placed.     Did you try to manage your symptoms your self? No     Did you call anyone? Yes     Who did you call? --   NH Nurse/MD    Did you try to see or did see a doctor or nurse before you came? Yes     Were you seen? Yes     Was this a planned readmission? No                 Discharge Plan A and Plan B have been determined by review of patient's clinical status, future medical and therapeutic needs, and coverage/benefits for post-acute care in coordination with multidisciplinary team members.                           GERMAN Travis, LMSW  [facility]  Case Management  Ext. [phone]           "

## 2024-01-02 NOTE — HOSPITAL COURSE
Patient presented for the need of PICC line for IV antibiotics. ID was consulted, repeat studies, recs t continue IV cefepime for 3 days. Patient and family are not happy about the duration of antibiotics. Patient would need to follow outpatient urology regarding recurrent UTI.ID consulted and patient was discharged. Imaging and lab work reviewed, patient is ready for dc. PCP to follow on lab and imaging studies that require follow up. Thank you

## 2024-01-02 NOTE — SUBJECTIVE & OBJECTIVE
Interval History: unhappy about her colostomy bag, despite rn changing it for her. Declined wound care rn to [facility].       Objective:     Vital Signs (Most Recent):  Temp: 96.6 °F (35.9 °C) (01/02/24 1630)  Pulse: 79 (01/02/24 1630)  Resp: 17 (01/02/24 1630)  BP: (!) 152/67 (01/02/24 1630)  SpO2: 100 % (01/02/24 1630) Vital Signs (24h Range):  Temp:  [96.6 °F (35.9 °C)-98.3 °F (36.8 °C)] 96.6 °F (35.9 °C)  Pulse:  [63-86] 79  Resp:  [17-18] 17  SpO2:  [95 %-100 %] 100 %  BP: (113-162)/(58-70) 152/67        There is no height or weight on file to calculate BMI.  No intake or output data in the 24 hours ending 01/02/24 1657      Physical Exam  Constitutional:       General: She is not in acute distress.     Appearance: She is well-developed. She is not diaphoretic.   HENT:      Head: Normocephalic and atraumatic.      Nose: Nose normal.   Eyes:      Conjunctiva/sclera: Conjunctivae normal.   Cardiovascular:      Heart sounds: Normal heart sounds. No murmur heard.  Pulmonary:      Effort: Pulmonary effort is normal. No respiratory distress.   Abdominal:      General: Abdomen is flat. There is no distension.      Tenderness: There is no abdominal tenderness.      Comments: RLQ ostomy, leaking stool today 01/02 on exam.    Genitourinary:     Comments: Adult diaper in place.  Musculoskeletal:         General: No swelling or tenderness.      Cervical back: Normal range of motion. No rigidity or tenderness.      Right lower leg: No edema.      Left lower leg: No edema.   Skin:     General: Skin is warm and dry.      Findings: No erythema or rash.   Neurological:      Mental Status: She is alert. Mental status is at baseline.   Psychiatric:         Behavior: Behavior normal.         Thought Content: Thought content normal.             Significant Labs: All pertinent labs within the past 24 hours have been reviewed.    Significant Imaging: I have reviewed all pertinent imaging results/findings within the past 24 hours.

## 2024-01-02 NOTE — PROGRESS NOTES
[facility] - [facility] ([place] [address])  Infectious Disease  Progress Note    Patient Name: Annette Garcia  MRN: [id number]  Admission Date: 1/1/2024  Length of Stay: 1 days  Attending Physician: Abe Thomas DO  Primary Care Provider: Jose Valles MD    Isolation Status: No active isolations  Assessment/Plan:      Renal/  * Acute cystitis without hematuria  I have reviewed hospital notes from   service and other specialty providers. I have also reviewed CBC, CMP/BMP,  cultures and imaging with my interpretation as documented.      88-year-old female with history of SBO s/p colostomy, presented from her nursing home for PICC line for IV antibiotics for MDR Pseudomonas UTI. Per NH records in media tab; Ucx (12/28) +PSA (R-FQ). Reportedly, pt initially endorsed having some urge/freq, and dysuria; but during interview today, pt denies urinary sxs, flank pain, fever, N/V, or new / worsening abd pain. On arrival, UA not c/f UTI (wbc 5), occasional bacteria. Ucx+GNR (ID/susc pending). Bld cxs NGTD. Pt maintained on Iv-cefepime since 01/01; pt reports improving overall.       Recommendations / Plan:  Continue Iv-cefepime, to complete 3d course for uncomplicated UTI. PATRICIO 01/03. Last doses to be given tomorrow.  If recurrent urinary sxs or UTIs, consider urology eval outpt, as well as D-mannose, probiotic, and topical vaginal estrogen -- and for diaper irritant dermatitis to use topical cream as needed and change soaked diapers ASAP at SNF.  Pt does not need ID f/u appt scheduled at this time, but may f/u as needed.       -- ID will schedule pt for ID clinic f/u appt   -- Discussed with ID staff and primary team.  -- ID will sign off at this time.             Anticipated Disposition: return to [facility]    Thank you for your consult. I will sign off. Please contact us if you have any additional questions.    Christopher Mario PA-C  Infectious Disease  [facility] ([place] [address])    Subjective:      Principal Problem:Acute cystitis without hematuria    HPI: 88-year-old female with history of SBO s/p colostomy, presented from her nursing home for PICC line for IV antibiotics. Per primary team, patient complaining of urinary frequency. Patient denied having any urinary tract symptoms on evaluation. Patient reports feeling as good as she possibly could for her age. Reports having a good appetite. ROS negative.  Interval History: NAEON. Pt remains AF, VSS, HDS, wbc nml. Maintained on cefepime since 01/01. UA not c/f UTI, wbc 5. Ucx +GNR. Ucx from NH (12/28) +PSA (R-FQ). Ostomy seal came loose today, with stool leaking. Otherwise, no acute issues.     Review of Systems   Constitutional:  Negative for chills, diaphoresis and fever.   HENT:  Negative for rhinorrhea and sore throat.    Eyes:  Negative for pain.   Respiratory:  Negative for cough and shortness of breath.    Cardiovascular:  Negative for chest pain and leg swelling.   Gastrointestinal:  Positive for abdominal pain (stable, mild at ostomy site.). Negative for diarrhea, nausea and vomiting.   Genitourinary:  Negative for dysuria, flank pain and hematuria.   Musculoskeletal:  Negative for arthralgias and myalgias.   Skin:  Negative for rash.   Neurological:  Negative for headaches.   Psychiatric/Behavioral:  Negative for behavioral problems and confusion.          Objective:     Vital Signs (Most Recent):  Temp: 98.1 °F (36.7 °C) (01/02/24 1229)  Pulse: 67 (01/02/24 1229)  Resp: 17 (01/02/24 1229)  BP: (!) 145/67 (01/02/24 1229)  SpO2: 100 % (01/02/24 1229) Vital Signs (24h Range):  Temp:  [96.7 °F (35.9 °C)-98.3 °F (36.8 °C)] 98.1 °F (36.7 °C)  Pulse:  [63-86] 67  Resp:  [17-18] 17  SpO2:  [95 %-100 %] 100 %  BP: (113-162)/(58-70) 145/67        There is no height or weight on file to calculate BMI.    Estimated Creatinine Clearance: 36.3 mL/min (based on SCr of 0.8 mg/dL).     Physical Exam  Constitutional:       General: She is not in acute  distress.     Appearance: She is well-developed. She is not diaphoretic.   HENT:      Head: Normocephalic and atraumatic.      Nose: Nose normal.   Eyes:      Conjunctiva/sclera: Conjunctivae normal.   Cardiovascular:      Heart sounds: Normal heart sounds. No murmur heard.  Pulmonary:      Effort: Pulmonary effort is normal. No respiratory distress.   Abdominal:      General: Abdomen is flat. There is no distension.      Tenderness: There is no abdominal tenderness.      Comments: RLQ ostomy, leaking stool today 01/02 on exam.    Genitourinary:     Comments: Adult diaper in place.  Musculoskeletal:         General: No swelling or tenderness.      Cervical back: Normal range of motion. No rigidity or tenderness.      Right lower leg: No edema.      Left lower leg: No edema.   Skin:     General: Skin is warm and dry.      Findings: No erythema or rash.   Neurological:      Mental Status: She is alert. Mental status is at baseline.   Psychiatric:         Behavior: Behavior normal.         Thought Content: Thought content normal.          Significant Labs: Blood Culture:   Recent Labs   Lab 01/01/24  0715 01/01/24  0716   LABBLOO No Growth to date  No Growth to date No Growth to date  No Growth to date     CBC:   Recent Labs   Lab 01/01/24  0716 01/02/24  0352   WBC 4.01 4.25   HGB 11.4* 11.6*   HCT 34.3* 35.3*    200     CMP:   Recent Labs   Lab 01/01/24  0716 01/02/24  0352   * 134*   K 4.3 4.3   CL 99 98   CO2 25 26   GLU 96 87   BUN 16 14   CREATININE 0.7 0.8   CALCIUM 9.4 9.3   PROT 6.7  --    ALBUMIN 3.7  --    BILITOT 0.4  --    ALKPHOS 87  --    AST 16  --    ALT 10  --    ANIONGAP 9 10     Microbiology Results (last 7 days)       Procedure Component Value Units Date/Time    Blood Culture #2 **CANNOT BE ORDERED STAT** [[id number]] Collected: 01/01/24 0715    Order Status: Completed Specimen: Blood from Peripheral, Wrist, Right Updated: 01/02/24 1012     Blood Culture, Routine No Growth to date       No Growth to date    Blood Culture #1 **CANNOT BE ORDERED STAT** [[id number]] Collected: 01/01/24 0716    Order Status: Completed Specimen: Blood from Peripheral, Hand, Right Updated: 01/02/24 1012     Blood Culture, Routine No Growth to date      No Growth to date    Urine culture [[id number]]  (Abnormal) Collected: 01/01/24 0736    Order Status: Completed Specimen: Urine from Clean Catch Updated: 01/02/24 0827     Urine Culture, Routine GRAM NEGATIVE SAMEERA  >100,000 cfu/ml  Identification and susceptibility pending      Narrative:      Specimen Source->Urine    add on CXURN per Maddy Cota PA-C  01/01/2024  09:18     Urine culture [[id number]]     Order Status: Completed Specimen: Urine, Clean Catch           Urine Culture:   Recent Labs   Lab 01/01/24 0736   LABURIN GRAM NEGATIVE SAMEEAR  >100,000 cfu/ml  Identification and susceptibility pending  *     Urine Studies:   Recent Labs   Lab 01/01/24 0736   COLORU Yellow   APPEARANCEUA Hazy*   PHUR 7.0   SPECGRAV 1.015   PROTEINUA Negative   GLUCUA Negative   KETONESU Negative   BILIRUBINUA Negative   OCCULTUA Negative   NITRITE Positive*   LEUKOCYTESUR 3+*   RBCUA 1   WBCUA 5   BACTERIA Occasional   SQUAMEPITHEL 1     All pertinent labs within the past 24 hours have been reviewed.    Significant Imaging: I have reviewed all pertinent imaging results/findings within the past 24 hours.    I have personally reviewed records / hospital notes from   service and other specialty providers. I have also reviewed CBC, CMP/BMP,  cultures and imaging with my interpretation as documented in my assessment / plan.    Patient is high risk for infectious complications given pt's age, multiple co-morbidities, and case complexity.      Time: 50 minutes   50% of time spent on face-to-face counseling and coordination of care. Counseling included review of test results, diagnosis, and treatment plan with patient and/or family.

## 2024-01-02 NOTE — PROGRESS NOTES
"[facility] ([address] Medicine  Progress Note    Patient Name: Annette Garcia  MRN: [id number]  Patient Class: IP- Inpatient   Admission Date: 1/1/2024  Length of Stay: 1 days  Attending Physician: Abe Thomas DO  Primary Care Provider: Jose Valles MD        Subjective:     Principal Problem:Acute cystitis without hematuria        HPI:  Annette Garcia is an 89 yo F with PMHx of chronic DVT, A fib, SBO, colostomy, and large B cell lymphoma s/p chemo who presented to ED from [facility] for a PICC line placement. She is A&O x4. Patient had a UA and urine cx collected 12/28 at her NH which showed multiresistant pseudomonas requiring IV abx. She has multiple allergies, including anaphylaxis with penicillin. Patient endorses urinary frequency and nausea without vomiting. She uses a wheelchair. Denies fever, chills, chest pain, palpitations, SOB, cough, and LE swelling.    In ED: CBC with stable chronic anemia and without leukocytosis. CMP unremarkable except for Na 133. UA with WBC 5, but with 3+ leuks and positive nitrites. Discussed with PICC team, who would like 48 hr of negative blood cx prior to PICC placement as patient has a history of bacteremia. Given IV cefepime. ID consulted. Admitted to hospital medicine for further management.     Overview/Hospital Course:  Patient presented for the need of PICC line for IV antibiotics. ID was consulted, repeat studies, recs t continue IV cefepime for 3 days. Patient and family are not happy about the duration of antibiotics. Patient would need to follow outpatient urology regarding recurrent UTI.    01/02: Patient was unhappy regarding the colostomy bag, I spoke with her and daughter for approximately 30 minutes combined. Patient reports that feels being deserted by her kids and is very unhappy about it. I offered multiple times to address her issue regrading the bad and she replies "it is not going to do any good". I " explained to her that we can have a wound care nurse to evaluate, and she refused. She did not want me to have her primary nurse to address it (again).       Interval History: unhappy about her colostomy bag, despite rn changing it for her. Declined wound care rn to chris.       Objective:     Vital Signs (Most Recent):  Temp: 96.6 °F (35.9 °C) (01/02/24 1630)  Pulse: 79 (01/02/24 1630)  Resp: 17 (01/02/24 1630)  BP: (!) 152/67 (01/02/24 1630)  SpO2: 100 % (01/02/24 1630) Vital Signs (24h Range):  Temp:  [96.6 °F (35.9 °C)-98.3 °F (36.8 °C)] 96.6 °F (35.9 °C)  Pulse:  [63-86] 79  Resp:  [17-18] 17  SpO2:  [95 %-100 %] 100 %  BP: (113-162)/(58-70) 152/67        There is no height or weight on file to calculate BMI.  No intake or output data in the 24 hours ending 01/02/24 1657      Physical Exam  Constitutional:       General: She is not in acute distress.     Appearance: She is well-developed. She is not diaphoretic.   HENT:      Head: Normocephalic and atraumatic.      Nose: Nose normal.   Eyes:      Conjunctiva/sclera: Conjunctivae normal.   Cardiovascular:      Heart sounds: Normal heart sounds. No murmur heard.  Pulmonary:      Effort: Pulmonary effort is normal. No respiratory distress.   Abdominal:      General: Abdomen is flat. There is no distension.      Tenderness: There is no abdominal tenderness.      Comments: RLQ ostomy, leaking stool today 01/02 on exam.    Genitourinary:     Comments: Adult diaper in place.  Musculoskeletal:         General: No swelling or tenderness.      Cervical back: Normal range of motion. No rigidity or tenderness.      Right lower leg: No edema.      Left lower leg: No edema.   Skin:     General: Skin is warm and dry.      Findings: No erythema or rash.   Neurological:      Mental Status: She is alert. Mental status is at baseline.   Psychiatric:         Behavior: Behavior normal.         Thought Content: Thought content normal.             Significant Labs: All pertinent labs  within the past 24 hours have been reviewed.    Significant Imaging: I have reviewed all pertinent imaging results/findings within the past 24 hours.    Assessment/Plan:      * Acute cystitis without hematuria  87 yo F with PMHx of chronic DVT, A fib, and large B cell lymphoma who presented to ED from [facility] for a PICC line placement. Recent urine cx at her NH which showed multiresistant pseudomonas requiring IV abx. UA with positive leuks and nitrites on admit.     - started on cefepime in ED; will continue  - PICC team consulted and requesting 48 hr of negative blood cx  - blood cx pending  - new urine cx pending   - ID consulted;  - discussed possibility of using one time gentamicin; ID will follow up.  - Continue cefepime for a total of 3 days inpatient. Follow up with urology output.     Chronic deep vein thrombosis (DVT) of left lower extremity  - continue eliquis    Coronary artery disease  - continue statin    A-fib  Patient with Paroxysmal (<7 days) atrial fibrillation which is controlled currently with Beta Blocker. Patient is currently in sinus rhythm.[id number] Score: 4. Anticoagulation indicated. Anticoagulation done with eliquis .    Anemia due to antineoplastic chemotherapy  Patient's anemia is currently controlled. Has not received any PRBCs to date. Current CBC reviewed-   Lab Results   Component Value Date    HGB 11.4 (L) 01/01/2024    HCT 34.3 (L) 01/01/2024     Monitor serial CBC and transfuse if patient becomes hemodynamically unstable, symptomatic or H/H drops below 7/21.    Hypertension  - BP elevated on admit  - continue losartan 25 mg, lopressor 25 mg and lasix    DLBCL (diffuse large B cell lymphoma)  - completed chemotherapy in 2017  - has not followed with heme/onc in many years    SBO (small bowel obstruction)  S/p bowel resection  Colostomy   No acute issues    Hypothyroidism  - continue synthroid     Seizure disorder  - continue phenobarbital       VTE Risk Mitigation (From  admission, onward)           Ordered     apixaban tablet 2.5 mg  2 times daily         01/01/24 1204     IP VTE HIGH RISK PATIENT  Once         01/01/24 0924     Reason for No Pharmacological VTE Prophylaxis  Once        Question:  Reasons:  Answer:  Already adequately anticoagulated on oral Anticoagulants    01/01/24 0924                    Discharge Planning   JESSICA: 1/3/2024     Code Status: DNR   Is the patient medically ready for discharge?:     Reason for patient still in hospital (select all that apply): Patient trending condition and Treatment  Discharge Plan A: Return to nursing home                  Abe Thomas DO  Department of Hospital Medicine   [facility] - Intensive Care ([place])

## 2024-01-03 ENCOUNTER — TELEPHONE (OUTPATIENT)
Dept: FAMILY MEDICINE | Facility: CLINIC | Age: 89
End: 2024-01-03
Payer: MEDICARE

## 2024-01-03 VITALS
RESPIRATION RATE: 17 BRPM | SYSTOLIC BLOOD PRESSURE: 145 MMHG | HEART RATE: 80 BPM | OXYGEN SATURATION: 100 % | TEMPERATURE: 99 F | DIASTOLIC BLOOD PRESSURE: 65 MMHG

## 2024-01-03 PROBLEM — N30.00 ACUTE CYSTITIS WITHOUT HEMATURIA: Status: RESOLVED | Noted: 2024-01-01 | Resolved: 2024-01-03

## 2024-01-03 LAB
ANION GAP SERPL CALC-SCNC: 12 MMOL/L (ref 8–16)
BUN SERPL-MCNC: 18 MG/DL (ref 8–23)
CALCIUM SERPL-MCNC: 9.5 MG/DL (ref 8.7–10.5)
CHLORIDE SERPL-SCNC: 97 MMOL/L (ref 95–110)
CO2 SERPL-SCNC: 27 MMOL/L (ref 23–29)
CREAT SERPL-MCNC: 1 MG/DL (ref 0.5–1.4)
ERYTHROCYTE [DISTWIDTH] IN BLOOD BY AUTOMATED COUNT: 13.9 % (ref 11.5–14.5)
EST. GFR  (NO RACE VARIABLE): 54.9 ML/MIN/1.73 M^2
GLUCOSE SERPL-MCNC: 95 MG/DL (ref 70–110)
HCT VFR BLD AUTO: 34.8 % (ref 37–48.5)
HGB BLD-MCNC: 11.5 G/DL (ref 12–16)
MAGNESIUM SERPL-MCNC: 1.7 MG/DL (ref 1.6–2.6)
MCH RBC QN AUTO: 32.6 PG (ref 27–31)
MCHC RBC AUTO-ENTMCNC: 33 G/DL (ref 32–36)
MCV RBC AUTO: 99 FL (ref 82–98)
PLATELET # BLD AUTO: 200 K/UL (ref 150–450)
PMV BLD AUTO: 9.9 FL (ref 9.2–12.9)
POTASSIUM SERPL-SCNC: 4.8 MMOL/L (ref 3.5–5.1)
RBC # BLD AUTO: 3.53 M/UL (ref 4–5.4)
SODIUM SERPL-SCNC: 136 MMOL/L (ref 136–145)
WBC # BLD AUTO: 5.12 K/UL (ref 3.9–12.7)

## 2024-01-03 PROCEDURE — 36415 COLL VENOUS BLD VENIPUNCTURE: CPT

## 2024-01-03 PROCEDURE — 25000003 PHARM REV CODE 250

## 2024-01-03 PROCEDURE — 63600175 PHARM REV CODE 636 W HCPCS

## 2024-01-03 PROCEDURE — 85027 COMPLETE CBC AUTOMATED: CPT

## 2024-01-03 PROCEDURE — 80048 BASIC METABOLIC PNL TOTAL CA: CPT

## 2024-01-03 PROCEDURE — 83735 ASSAY OF MAGNESIUM: CPT

## 2024-01-03 RX ORDER — POTASSIUM CHLORIDE 750 MG/1
10 TABLET, EXTENDED RELEASE ORAL DAILY
Qty: 120 TABLET | Refills: 0 | Status: SHIPPED | OUTPATIENT
Start: 2024-01-03 | End: 2024-05-02

## 2024-01-03 RX ADMIN — POLYETHYLENE GLYCOL 3350 17 G: 17 POWDER, FOR SOLUTION ORAL at 09:01

## 2024-01-03 RX ADMIN — APIXABAN 2.5 MG: 2.5 TABLET, FILM COATED ORAL at 09:01

## 2024-01-03 RX ADMIN — CHOLECALCIFEROL TAB 25 MCG (1000 UNIT) 1000 UNITS: 25 TAB at 09:01

## 2024-01-03 RX ADMIN — POTASSIUM CHLORIDE 20 MEQ: 1500 TABLET, EXTENDED RELEASE ORAL at 09:01

## 2024-01-03 RX ADMIN — CEFEPIME 1 G: 1 INJECTION, POWDER, FOR SOLUTION INTRAMUSCULAR; INTRAVENOUS at 06:01

## 2024-01-03 RX ADMIN — METOPROLOL TARTRATE 25 MG: 25 TABLET, FILM COATED ORAL at 09:01

## 2024-01-03 RX ADMIN — PANTOPRAZOLE SODIUM 40 MG: 40 TABLET, DELAYED RELEASE ORAL at 09:01

## 2024-01-03 RX ADMIN — FUROSEMIDE 20 MG: 20 TABLET ORAL at 09:01

## 2024-01-03 RX ADMIN — LEVOTHYROXINE SODIUM 125 MCG: 25 TABLET ORAL at 05:01

## 2024-01-03 RX ADMIN — LOSARTAN POTASSIUM 25 MG: 25 TABLET, FILM COATED ORAL at 09:01

## 2024-01-03 RX ADMIN — DOCUSATE SODIUM 100 MG: 100 CAPSULE, LIQUID FILLED ORAL at 09:01

## 2024-01-03 RX ADMIN — PRAVASTATIN SODIUM 20 MG: 20 TABLET ORAL at 09:01

## 2024-01-03 NOTE — PLAN OF CARE
Follow up with Jose Valles MD  CHW called to schedule pcp f/u, Lucretia () sent message to nurse to call patient with appt. [address] [place] [id number] [phone]

## 2024-01-03 NOTE — PLAN OF CARE
CHW met with patient/family at bedside. Patient experience rounding completed and reviewed the following.     Do you know your discharge plan? Yes    If yes, what is the plan? ([facility])     Have you discussed your needs and preferences with your SW/CM? Yes   If you are discharging home, do you have help at home? Yes NH  Do you think you will need help additional at home at discharge?  No     Do you currently have difficulty keeping up with bills, affording medicine or buying food? No    Assigned SW/CM notified of any patient/family needs or concerns. Appropriate resources provided to address patient's needs.  No needs/concerns

## 2024-01-03 NOTE — PLAN OF CARE
NURSING HOME ORDERS    01/03/2024  [facility]  [address]  [facility] [address]  Dept: [phone]  Loc: [phone]     Admit to Nursing Home:      Diagnoses:  Active Hospital Problems    Diagnosis  POA    Chronic deep vein thrombosis (DVT) of left lower extremity [I82.502]  Yes     453.50 2022-07-27 Chronic deep vein thrombosis (DVT) of left lower extremity Active 362087933957424 I82.502 [id number]      Colostomy present [Z93.3]  Not Applicable    Coronary artery disease [I25.10]  Yes    A-fib [I48.91]  Yes    S/P partial resection of colon [Z90.49]  Not Applicable    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, T45.1X5A]  Yes     Chronic    Hypertension [I10]  Yes    DLBCL (diffuse large B cell lymphoma) [C83.30]  Yes     Chronic    Seizure disorder [G40.909]  Yes     Epilepsy type unknown      Hypothyroidism [E03.9]  Yes      Resolved Hospital Problems    Diagnosis Date Resolved POA    *Acute cystitis without hematuria [N30.00] 01/03/2024 Yes    SBO (small bowel obstruction) [K56.609] 01/03/2024 Yes       Patient is homebound due to:  Acute cystitis without hematuria    Allergies:  Review of patient's allergies indicates:   Allergen Reactions    Adhesive Itching and Blisters    Penicillins Anaphylaxis    Tramadol Hives    Avelox [moxifloxacin] Rash     Facial and arm itching and redness. Pt states throat closes when given.    Amoxil [amoxicillin]     Aspridrox [aspirin, buffered]     Codeine Other (See Comments)     Throat swelling    Keflex [cephalexin]      Tolerated cefepime and cefazolin    Norvasc [amlodipine]     Red dye Hives    Robitussin [guaifenesin]     Sulfa (sulfonamide antibiotics)     Tylenol [acetaminophen]      Has reaction to Tylenol with red dye and unable to take Extra Strength Tylenol/ CAN ONLY TOLERATE REG STRENGTH TYLENOL    Vicks vaporub [camphor-eucalyptus oil-menthol]        Vitals:  Routine    Diet: cardiac  diet    Activities:   Activity as tolerated    Goals of Care Treatment Preferences:  Code Status: DNR    Living Will: Yes              Labs:  CBC, Cmp in 3 days and forward to pcp    Nursing Precautions:  Fall and Pressure ulcer prevention    Consults:   Wound Care and Nutrition to evaluate and recommend diet     Miscellaneous Care: Colostomy Care:  Empty bag every shift and prn  Wound Care: yes:  Skin teat on forearm  CHF Care: Daily Weight with notification of MD/NP of 2lb or > increase in 24 hours    v/s and O2 sat every shift    Oxygen as needed for sats <90%    Report abnormal breath sounds to MD/NP    Edema checks q shift- notify MD/NP of increased edema    Task segmentation by nursing for daily care to decrease exertion    Schedule appointment with cardiologist, 2 Weeks    CHF education to include diet ,medication, and CHF flags for MD notification                   Diabetes Care:  SN to perform and educate Diabetic management with blood glucose monitoring: and Report CBG < 60 or > 350 to physician.      Medications: Discontinue all previous medication orders, if any. See new list below.     Medication List        CHANGE how you take these medications      potassium chloride SA 10 MEQ tablet  Commonly known as: K-DUR,KLOR-CON M  Take 1 tablet (10 mEq total) by mouth once daily.  What changed:   medication strength  how much to take            CONTINUE taking these medications      acetaminophen 325 MG tablet  Commonly known as: TYLENOL  Take 2 tablets (650 mg total) by mouth every 4 (four) hours as needed for Pain.     apixaban 2.5 mg Tab  Commonly known as: ELIQUIS  Take 1 tablet (2.5 mg total) by mouth 2 (two) times daily.     calcium-vitamin D 600 mg-10 mcg (400 unit) Tab  Take 1 tablet by mouth once daily.     docusate sodium 100 MG capsule  Commonly known as: COLACE  Take 1 capsule (100 mg total) by mouth 2 (two) times daily.     furosemide 20 MG tablet  Commonly known as: LASIX  Take 20 mg by mouth once  daily.     levothyroxine 125 MCG tablet  Commonly known as: SYNTHROID  TAKE 1 TABLET (125 MCG TOTAL) BY MOUTH ONCE DAILY.     losartan 25 MG tablet  Commonly known as: COZAAR  Take 25 mg by mouth once daily.     magnesium oxide 400 mg (241.3 mg magnesium) tablet  Commonly known as: MAG-OX  Take 2 tablets (800 mg total) by mouth once daily.     metoprolol tartrate 25 MG tablet  Commonly known as: LOPRESSOR  Take 25 mg by mouth 2 (two) times daily.     ondansetron 4 MG tablet  Commonly known as: ZOFRAN  Take 4 mg by mouth every 6 (six) hours as needed for Nausea (vomiting).     OXcarbazepine 150 MG Tab  Commonly known as: TRILEPTAL  Take 150 mg by mouth nightly.     pantoprazole 40 MG tablet  Commonly known as: PROTONIX  Take 40 mg by mouth once daily.     PHENobarbitaL 97.2 MG tablet  Take 97.2 mg by mouth every evening.     polyethylene glycol 17 gram Pwpk  Commonly known as: GLYCOLAX  Take 17 g by mouth once daily.     pravastatin 20 MG tablet  Commonly known as: PRAVACHOL  Take 20 mg by mouth once daily.     vitamin D 1000 units Tab  Commonly known as: VITAMIN D3  Take 1,000 Units by mouth once daily.                Immunizations Administered as of 1/3/2024       No immunizations on file.                Some patients may experience side effects after vaccination.  These may include fever, headache, muscle or joint aches.  Most symptoms resolve with 24-48 hours and do not require urgent medical evaluation unless they persist for more than 72 hours or symptoms are concerning for an unrelated medical condition.          _________________________________  Abe Thomas DO  01/03/2024

## 2024-01-03 NOTE — TELEPHONE ENCOUNTER
----- Message from Lucretia Vazquez sent at 1/3/2024 12:33 PM CST -----  Needs advice from nurse:      Who Called:case management  Regarding:needs to schedule hospital f/u/pt discharged today  Would the patient rather a call back or [facility]IA [facility]?  Best Call Back number: Call pt to schedule at [phone]  Additional Info:

## 2024-01-03 NOTE — PLAN OF CARE
"  Problem: Adult Inpatient Plan of Care  Goal: Plan of Care Review  Outcome: Ongoing, Progressing  Goal: Absence of Hospital-Acquired Illness or Injury  Outcome: Ongoing, Progressing  Goal: Optimal Comfort and Wellbeing  Outcome: Ongoing, Progressing  Goal: Readiness for Transition of Care  Outcome: Ongoing, Progressing     Problem: Infection  Goal: Absence of Infection Signs and Symptoms  Outcome: Ongoing, Progressing     Problem: Fall Injury Risk  Goal: Absence of Fall and Fall-Related Injury  Outcome: Ongoing, Progressing     Problem: Skin Injury Risk Increased  Goal: Skin Health and Integrity  Outcome: Ongoing, Progressing     Throughout this shift, the pt had periodic moments of tearfulness and was upset with her family's attitude about her wellbeing. She repeatedly brought up the fact that her daughter eventually confessed that "she had no intention of coming to the hospital" the night she came to the ED and then admitted to the unit when she reports that the nurse at her facility said she would.     Pt's needs were met throughout shift. Therapeutic communication utilized to help redirect and calm patient down. Call light within reach. Pt observed to be sleeping comfortably with even and unlabored respirations.   "

## 2024-01-03 NOTE — PLAN OF CARE
Discharge Plan A and Plan B have been determined by review of patient's clinical status, future medical and therapeutic needs, and coverage/benefits for post-acute care in coordination with multidisciplinary team members.    01/03/24 1413   Post-Acute Status   Post-Acute Authorization Placement   Post-Acute Placement Status Pending post-acute provider review/more information requested   Coverage MEDICARE - MEDICARE PART A & B -   Discharge Plan   Discharge Plan A Return to nursing home   Discharge Plan B New Nursing Home placement - snf care facility     ISRAEL received notification that patient ready for dc to NH Today. ISRAEL requested that MD place NH orders. ISRAEL notified [facility] that patient ready for dc to NH Today. SW to send NH orders once placed by MD. Transport orders to follow. ISRAEL notified patient/family and agreeable to dc plan.      2:25pm  ISRAEL placed [facility] orders for patient transport to [facility]. Report to be called to [phone].    ISRAEL will continue to follow.               GERMAN Travis, LMSW  [facility]  Case Management  Ext. [phone]

## 2024-01-03 NOTE — PLAN OF CARE
Problem: Adult Inpatient Plan of Care  Goal: Plan of Care Review  Outcome: Met  Flowsheets (Taken 1/3/2024 1410)  Plan of Care Reviewed With: patient  Goal: Patient-Specific Goal (Individualized)  Outcome: Met  Goal: Absence of Hospital-Acquired Illness or Injury  Outcome: Ongoing, Progressing  Goal: Optimal Comfort and Wellbeing  Outcome: Met  Goal: Readiness for Transition of Care  Outcome: Met  AAOX4, report given to the receiving nurse, no acute events noted during this shift, vital signs stable, scheduled meds given, no complaints by pt/ daugther, pt transported via wheelchair van.

## 2024-01-04 NOTE — PLAN OF CARE
Anirudh Naidu - Med Surg ([facility]-16)  Discharge Final Note    Primary Care Provider: [facility]    Expected Discharge Date: 1/3/2024    Final Discharge Note (most recent)       Final Note - 01/04/24 0929          Final Note    Assessment Type Final Discharge Note (P)      Anticipated Discharge Disposition Intermediate Care Facility for [facility] or Supportive Care (P)    [facility] (skilled nursing)    What phone number can be called within the next 1-3 days to see how you are doing after discharge? [phone] (P)         Post-Acute Status    Post-Acute Authorization Placement (P)      Post-Acute Placement Status Set-up Complete/Auth obtained (P)      Coverage MEDICARE - MEDICARE PART A & B - (P)                      Important Message from Medicare  Important Message from Medicare regarding Discharge Appeal Rights: Given to patient/caregiver, Explained to patient/caregiver, Signed/date by patient/caregiver     Date IMM was signed: 01/03/24  Time IMM was signed: 1321    Contact Info       Jose Valles MD   Specialty: Family Medicine    [address] [address]   Phone: [phone]       Next Steps: Follow up    Instructions: CHW called to schedule pcp f/uLucretia () sent message to nurse to call patient with appt.          Patient dc to [facility] (skilled nursing).                            GERMAN Travis, LMSW  [facility]  Case Management  Ext. [phone]

## 2024-01-06 LAB
BACTERIA BLD CULT: NORMAL
BACTERIA BLD CULT: NORMAL

## 2024-01-08 LAB — BACTERIA UR CULT: ABNORMAL

## 2024-01-19 NOTE — DISCHARGE SUMMARY
Anirudh Naidu - Med Surg ([address] [place] [address])  [facility] Medicine  Discharge Summary      Patient Name: Annette Garcia  MRN: [id number]  STEFANO: [id number]  Patient Class: IP- Inpatient  Admission Date: 1/1/2024  Hospital Length of Stay: 2 days  Discharge Date and Time: 1/3/2024  6:44 PM  Attending Physician: Tamiko att. providers found   Discharging Provider: Abe Thomas DO  Primary Care Provider: [facility] Hospital Medicine Team: [facility] MED Q Abe Thomas DO  Primary Care Team: [facility] MED Q    HPI:   Annette Gacria is an 89 yo F with PMHx of chronic DVT, A fib, SBO, colostomy, and large B cell lymphoma s/p chemo who presented to ED from [facility] for a PICC line placement. She is A&O x4. Patient had a UA and urine cx collected 12/28 at her NH which showed multiresistant pseudomonas requiring IV abx. She has multiple allergies, including anaphylaxis with penicillin. Patient endorses urinary frequency and nausea without vomiting. She uses a wheelchair. Denies fever, chills, chest pain, palpitations, SOB, cough, and LE swelling.    In ED: CBC with stable chronic anemia and without leukocytosis. CMP unremarkable except for Na 133. UA with WBC 5, but with 3+ leuks and positive nitrites. Discussed with PICC team, who would like 48 hr of negative blood cx prior to PICC placement as patient has a history of bacteremia. Given IV cefepime. ID consulted. Admitted to hospital medicine for further management.     * No surgery found *      Hospital Course:   Patient presented for the need of PICC line for IV antibiotics. ID was consulted, repeat studies, recs t continue IV cefepime for 3 days. Patient and family are not happy about the duration of antibiotics. Patient would need to follow outpatient urology regarding recurrent UTI.ID consulted and patient was discharged. Imaging and lab work reviewed, patient is ready for dc. PCP to follow on lab and imaging studies that require follow up. Thank you      Physical Exam  Constitutional:       General: She is not in acute distress.     Appearance: She is well-developed. She is not diaphoretic.   HENT:      Head: Normocephalic and atraumatic.      Nose: Nose normal.   Eyes:      Conjunctiva/sclera: Conjunctivae normal.   Cardiovascular:      Heart sounds: Normal heart sounds. No murmur heard.  Pulmonary:      Effort: Pulmonary effort is normal. No respiratory distress.   Abdominal:      General: Abdomen is flat. There is no distension.      Tenderness: There is no abdominal tenderness.      Comments: RLQ ostomy, leaking stool today 01/02 on exam.    Genitourinary:     Comments: Adult diaper in place.  Musculoskeletal:         General: No swelling or tenderness.      Cervical back: Normal range of motion. No rigidity or tenderness.      Right lower leg: No edema.      Left lower leg: No edema.   Skin:     General: Skin is warm and dry.      Findings: No erythema or rash.   Neurological:      Mental Status: She is alert. Mental status is at baseline.   Psychiatric:         Behavior: Behavior normal.         Thought Content: Thought content normal.     Goals of Care Treatment Preferences:  Code Status: DNR    Living Will: Yes              Consults:   Consults (From admission, onward)          Status Ordering Provider     Inpatient consult to Infectious Diseases  Once        Provider:  (Not yet assigned)    PARKER Small new Assessment & Plan notes have been filed under this hospital service since the last note was generated.  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, T45.1X5A] 12/27/2016 Yes     Chronic    Hypertension [I10] 12/05/2016 Yes    DLBCL (diffuse large B cell lymphoma) [C83.30] 09/09/2016 Yes     Chronic    Seizure disorder [G40.909] 08/18/2016 Yes    Hypothyroidism [E03.9] 08/18/2016 Yes      Problems Resolved During this Admission:    Diagnosis Date Noted Date Resolved POA    PRINCIPAL PROBLEM:  Acute cystitis without hematuria [N30.00] 01/01/2024 01/03/2024 Yes    SBO (small bowel obstruction) [K56.609] 08/18/2016 01/03/2024 Yes       Discharged Condition: stable    Disposition: Skilled Nursing Facility    Follow Up:   Follow-up Information       Jose Valles MD Follow up.    Specialty: Family Medicine  Why: CHW called to schedule pcp f/u, Lucretia () sent message to nurse to call patient with appt.  Contact information:  [address] [place] [address] [phone]                           Patient Instructions:      Ambulatory referral/consult to Urology   Standing Status: Future   Referral Priority: Routine Referral Type: Consultation   Referral Reason: Specialty Services Required   Requested Specialty: Urology   Number of Visits Requested: 1     Notify your health care provider if you experience any of the following:  temperature >100.4     Notify your health care provider if you experience any of the following:     Notify your health care provider if you experience any of the following:  increased confusion or weakness     Notify your health care provider if you experience any of the following:  persistent dizziness, light-headedness, or visual disturbances     Notify your health care provider if you experience any of the following:  worsening rash     Notify your health care provider if you experience any of the following:  severe persistent headache     Notify your health care provider if you experience any of the following:  difficulty breathing or increased cough     Notify your health care provider if you experience  any of the following:  redness, tenderness, or signs of infection (pain, swelling, redness, odor or green/yellow discharge around incision site)     Notify your health care provider if you experience any of the following:  severe uncontrolled pain     Notify your health care provider if you experience any of the following:  persistent nausea and vomiting or diarrhea     Activity as tolerated       Significant Diagnostic Studies: Labs: All labs within the past 24 hours have been reviewed    Pending Diagnostic Studies:       None           Medications:  Reconciled Home Medications:      Medication List        CHANGE how you take these medications      potassium chloride SA 10 MEQ tablet  Commonly known as: K-DUR,KLOR-CON M  Take 1 tablet (10 mEq total) by mouth once daily.  What changed:   medication strength  how much to take            CONTINUE taking these medications      acetaminophen 325 MG tablet  Commonly known as: TYLENOL  Take 2 tablets (650 mg total) by mouth every 4 (four) hours as needed for Pain.     apixaban 2.5 mg Tab  Commonly known as: ELIQUIS  Take 1 tablet (2.5 mg total) by mouth 2 (two) times daily.     calcium-vitamin D 600 mg-10 mcg (400 unit) Tab  Take 1 tablet by mouth once daily.     docusate sodium 100 MG capsule  Commonly known as: COLACE  Take 1 capsule (100 mg total) by mouth 2 (two) times daily.     furosemide 20 MG tablet  Commonly known as: LASIX  Take 20 mg by mouth once daily.     levothyroxine 125 MCG tablet  Commonly known as: SYNTHROID  TAKE 1 TABLET (125 MCG TOTAL) BY MOUTH ONCE DAILY.     losartan 25 MG tablet  Commonly known as: COZAAR  Take 25 mg by mouth once daily.     magnesium oxide 400 mg (241.3 mg magnesium) tablet  Commonly known as: MAG-OX  Take 2 tablets (800 mg total) by mouth once daily.     metoprolol tartrate 25 MG tablet  Commonly known as: LOPRESSOR  Take 25 mg by mouth 2 (two) times daily.     ondansetron 4 MG tablet  Commonly known as: ZOFRAN  Take 4 mg by  mouth every 6 (six) hours as needed for Nausea (vomiting).     OXcarbazepine 150 MG Tab  Commonly known as: TRILEPTAL  Take 150 mg by mouth nightly.     pantoprazole 40 MG tablet  Commonly known as: PROTONIX  Take 40 mg by mouth once daily.     PHENobarbitaL 97.2 MG tablet  Take 97.2 mg by mouth every evening.     polyethylene glycol 17 gram Pwpk  Commonly known as: GLYCOLAX  Take 17 g by mouth once daily.     pravastatin 20 MG tablet  Commonly known as: PRAVACHOL  Take 20 mg by mouth once daily.     vitamin D 1000 units Tab  Commonly known as: VITAMIN D3  Take 1,000 Units by mouth once daily.              Indwelling Lines/Drains at time of discharge:   Lines/Drains/Airways       Drain  Duration                  Colostomy 10/29/19 1200 LLQ 1542 days                    Time spent on the discharge of patient: 45 minutes         Abe Thomas DO  Department of Hospital Medicine  [facility] ([place])

## 2024-01-28 ENCOUNTER — HOSPITAL ENCOUNTER (INPATIENT)
Facility: HOSPITAL | Age: 89
LOS: 5 days | Discharge: NURSING, REHAB OR LONG-TERM CARE | DRG: 336 | End: 2024-02-02
Attending: EMERGENCY MEDICINE | Admitting: INTERNAL MEDICINE
Payer: MEDICARE

## 2024-01-28 DIAGNOSIS — Z98.890 S/P EXPLORATORY LAPAROTOMY: ICD-10-CM

## 2024-01-28 DIAGNOSIS — K56.609 SBO (SMALL BOWEL OBSTRUCTION): Primary | ICD-10-CM

## 2024-01-28 DIAGNOSIS — R06.02 SHORTNESS OF BREATH: ICD-10-CM

## 2024-01-28 DIAGNOSIS — R07.9 CHEST PAIN: ICD-10-CM

## 2024-01-28 DIAGNOSIS — R11.10 INTRACTABLE VOMITING: ICD-10-CM

## 2024-01-28 DIAGNOSIS — A49.8 PSEUDOMONAS INFECTION: ICD-10-CM

## 2024-01-28 DIAGNOSIS — R33.9 URINARY RETENTION: Chronic | ICD-10-CM

## 2024-01-28 DIAGNOSIS — K56.609 SMALL BOWEL OBSTRUCTION: ICD-10-CM

## 2024-01-28 DIAGNOSIS — R11.2 NAUSEA AND VOMITING, UNSPECIFIED VOMITING TYPE: ICD-10-CM

## 2024-01-28 DIAGNOSIS — I10 HTN (HYPERTENSION): ICD-10-CM

## 2024-01-28 PROBLEM — I16.0 HYPERTENSIVE URGENCY: Status: ACTIVE | Noted: 2024-01-28

## 2024-01-28 PROBLEM — N39.0 UTI (URINARY TRACT INFECTION): Status: ACTIVE | Noted: 2024-01-28

## 2024-01-28 LAB
ALBUMIN SERPL BCP-MCNC: 3.8 G/DL (ref 3.5–5.2)
ALP SERPL-CCNC: 98 U/L (ref 55–135)
ALT SERPL W/O P-5'-P-CCNC: 16 U/L (ref 10–44)
ANION GAP SERPL CALC-SCNC: 15 MMOL/L (ref 8–16)
AST SERPL-CCNC: 23 U/L (ref 10–40)
BACTERIA #/AREA URNS HPF: ABNORMAL /HPF
BASOPHILS # BLD AUTO: 0.01 K/UL (ref 0–0.2)
BASOPHILS NFR BLD: 0.1 % (ref 0–1.9)
BILIRUB SERPL-MCNC: 0.3 MG/DL (ref 0.1–1)
BILIRUB UR QL STRIP: NEGATIVE
BUN SERPL-MCNC: 16 MG/DL (ref 8–23)
CALCIUM SERPL-MCNC: 9.7 MG/DL (ref 8.7–10.5)
CHLORIDE SERPL-SCNC: 93 MMOL/L (ref 95–110)
CLARITY UR: ABNORMAL
CO2 SERPL-SCNC: 29 MMOL/L (ref 23–29)
COLOR UR: YELLOW
CREAT SERPL-MCNC: 1 MG/DL (ref 0.5–1.4)
DIFFERENTIAL METHOD BLD: ABNORMAL
EOSINOPHIL # BLD AUTO: 0 K/UL (ref 0–0.5)
EOSINOPHIL NFR BLD: 0.1 % (ref 0–8)
ERYTHROCYTE [DISTWIDTH] IN BLOOD BY AUTOMATED COUNT: 13.7 % (ref 11.5–14.5)
EST. GFR  (NO RACE VARIABLE): 55 ML/MIN/1.73 M^2
GLUCOSE SERPL-MCNC: 150 MG/DL (ref 70–110)
GLUCOSE UR QL STRIP: NEGATIVE
HCT VFR BLD AUTO: 33.6 % (ref 37–48.5)
HGB BLD-MCNC: 11.5 G/DL (ref 12–16)
HGB UR QL STRIP: ABNORMAL
HYALINE CASTS #/AREA URNS LPF: 0 /LPF
IMM GRANULOCYTES # BLD AUTO: 0.03 K/UL (ref 0–0.04)
IMM GRANULOCYTES NFR BLD AUTO: 0.3 % (ref 0–0.5)
INR PPP: 1 (ref 0.8–1.2)
KETONES UR QL STRIP: NEGATIVE
LACTATE SERPL-SCNC: 1.8 MMOL/L (ref 0.5–2.2)
LEUKOCYTE ESTERASE UR QL STRIP: ABNORMAL
LIPASE SERPL-CCNC: 9 U/L (ref 4–60)
LYMPHOCYTES # BLD AUTO: 1 K/UL (ref 1–4.8)
LYMPHOCYTES NFR BLD: 10.3 % (ref 18–48)
MAGNESIUM SERPL-MCNC: 1.9 MG/DL (ref 1.6–2.6)
MCH RBC QN AUTO: 32.3 PG (ref 27–31)
MCHC RBC AUTO-ENTMCNC: 34.2 G/DL (ref 32–36)
MCV RBC AUTO: 94 FL (ref 82–98)
MICROSCOPIC COMMENT: ABNORMAL
MONOCYTES # BLD AUTO: 0.6 K/UL (ref 0.3–1)
MONOCYTES NFR BLD: 6.1 % (ref 4–15)
NEUTROPHILS # BLD AUTO: 8 K/UL (ref 1.8–7.7)
NEUTROPHILS NFR BLD: 83.1 % (ref 38–73)
NITRITE UR QL STRIP: POSITIVE
NRBC BLD-RTO: 0 /100 WBC
PH UR STRIP: 7 [PH] (ref 5–8)
PLATELET # BLD AUTO: 215 K/UL (ref 150–450)
PMV BLD AUTO: 9.6 FL (ref 9.2–12.9)
POCT GLUCOSE: 123 MG/DL (ref 70–110)
POCT GLUCOSE: 133 MG/DL (ref 70–110)
POCT GLUCOSE: 140 MG/DL (ref 70–110)
POTASSIUM SERPL-SCNC: 4.6 MMOL/L (ref 3.5–5.1)
PROT SERPL-MCNC: 7.3 G/DL (ref 6–8.4)
PROT UR QL STRIP: ABNORMAL
PROTHROMBIN TIME: 11 SEC (ref 9–12.5)
RBC # BLD AUTO: 3.56 M/UL (ref 4–5.4)
RBC #/AREA URNS HPF: 8 /HPF (ref 0–4)
SODIUM SERPL-SCNC: 137 MMOL/L (ref 136–145)
SP GR UR STRIP: 1.01 (ref 1–1.03)
SQUAMOUS #/AREA URNS HPF: 3 /HPF
T4 FREE SERPL-MCNC: 0.98 NG/DL (ref 0.71–1.51)
TSH SERPL DL<=0.005 MIU/L-ACNC: 0.38 UIU/ML (ref 0.4–4)
URN SPEC COLLECT METH UR: ABNORMAL
UROBILINOGEN UR STRIP-ACNC: NEGATIVE EU/DL
WBC # BLD AUTO: 9.58 K/UL (ref 3.9–12.7)
WBC #/AREA URNS HPF: >100 /HPF (ref 0–5)

## 2024-01-28 PROCEDURE — 93010 ELECTROCARDIOGRAM REPORT: CPT | Mod: ,,, | Performed by: INTERNAL MEDICINE

## 2024-01-28 PROCEDURE — 25000003 PHARM REV CODE 250: Performed by: INTERNAL MEDICINE

## 2024-01-28 PROCEDURE — 85025 COMPLETE CBC W/AUTO DIFF WBC: CPT | Performed by: EMERGENCY MEDICINE

## 2024-01-28 PROCEDURE — 84439 ASSAY OF FREE THYROXINE: CPT | Performed by: EMERGENCY MEDICINE

## 2024-01-28 PROCEDURE — 80053 COMPREHEN METABOLIC PANEL: CPT | Performed by: EMERGENCY MEDICINE

## 2024-01-28 PROCEDURE — 0D9670Z DRAINAGE OF STOMACH WITH DRAINAGE DEVICE, VIA NATURAL OR ARTIFICIAL OPENING: ICD-10-PCS | Performed by: EMERGENCY MEDICINE

## 2024-01-28 PROCEDURE — 63600175 PHARM REV CODE 636 W HCPCS: Performed by: EMERGENCY MEDICINE

## 2024-01-28 PROCEDURE — 87088 URINE BACTERIA CULTURE: CPT | Performed by: EMERGENCY MEDICINE

## 2024-01-28 PROCEDURE — 85610 PROTHROMBIN TIME: CPT | Performed by: EMERGENCY MEDICINE

## 2024-01-28 PROCEDURE — 11000001 HC ACUTE MED/SURG PRIVATE ROOM

## 2024-01-28 PROCEDURE — 84443 ASSAY THYROID STIM HORMONE: CPT | Performed by: EMERGENCY MEDICINE

## 2024-01-28 PROCEDURE — 87077 CULTURE AEROBIC IDENTIFY: CPT | Performed by: EMERGENCY MEDICINE

## 2024-01-28 PROCEDURE — 83605 ASSAY OF LACTIC ACID: CPT | Performed by: EMERGENCY MEDICINE

## 2024-01-28 PROCEDURE — 83690 ASSAY OF LIPASE: CPT | Performed by: EMERGENCY MEDICINE

## 2024-01-28 PROCEDURE — 63600175 PHARM REV CODE 636 W HCPCS: Performed by: INTERNAL MEDICINE

## 2024-01-28 PROCEDURE — 96375 TX/PRO/DX INJ NEW DRUG ADDON: CPT

## 2024-01-28 PROCEDURE — 99285 EMERGENCY DEPT VISIT HI MDM: CPT | Mod: 25

## 2024-01-28 PROCEDURE — 96374 THER/PROPH/DIAG INJ IV PUSH: CPT

## 2024-01-28 PROCEDURE — 25000003 PHARM REV CODE 250: Performed by: EMERGENCY MEDICINE

## 2024-01-28 PROCEDURE — 25500020 PHARM REV CODE 255: Performed by: EMERGENCY MEDICINE

## 2024-01-28 PROCEDURE — 83735 ASSAY OF MAGNESIUM: CPT | Performed by: EMERGENCY MEDICINE

## 2024-01-28 PROCEDURE — 81000 URINALYSIS NONAUTO W/SCOPE: CPT | Performed by: EMERGENCY MEDICINE

## 2024-01-28 PROCEDURE — 82962 GLUCOSE BLOOD TEST: CPT

## 2024-01-28 PROCEDURE — 93005 ELECTROCARDIOGRAM TRACING: CPT

## 2024-01-28 PROCEDURE — 87186 SC STD MICRODIL/AGAR DIL: CPT | Performed by: EMERGENCY MEDICINE

## 2024-01-28 PROCEDURE — 87086 URINE CULTURE/COLONY COUNT: CPT | Performed by: EMERGENCY MEDICINE

## 2024-01-28 RX ORDER — SODIUM CHLORIDE 9 MG/ML
500 INJECTION, SOLUTION INTRAVENOUS
Status: COMPLETED | OUTPATIENT
Start: 2024-01-28 | End: 2024-01-28

## 2024-01-28 RX ORDER — PRAVASTATIN SODIUM 10 MG/1
20 TABLET ORAL NIGHTLY
Status: DISCONTINUED | OUTPATIENT
Start: 2024-01-28 | End: 2024-02-02 | Stop reason: HOSPADM

## 2024-01-28 RX ORDER — LABETALOL HYDROCHLORIDE 5 MG/ML
10 INJECTION, SOLUTION INTRAVENOUS
Status: COMPLETED | OUTPATIENT
Start: 2024-01-28 | End: 2024-01-28

## 2024-01-28 RX ORDER — PANTOPRAZOLE SODIUM 40 MG/1
40 TABLET, DELAYED RELEASE ORAL DAILY
Status: DISCONTINUED | OUTPATIENT
Start: 2024-01-29 | End: 2024-02-02 | Stop reason: HOSPADM

## 2024-01-28 RX ORDER — IBUPROFEN 200 MG
24 TABLET ORAL
Status: DISCONTINUED | OUTPATIENT
Start: 2024-01-28 | End: 2024-02-02 | Stop reason: HOSPADM

## 2024-01-28 RX ORDER — GLUCAGON 1 MG
1 KIT INJECTION
Status: DISCONTINUED | OUTPATIENT
Start: 2024-01-28 | End: 2024-02-02 | Stop reason: HOSPADM

## 2024-01-28 RX ORDER — ONDANSETRON HYDROCHLORIDE 2 MG/ML
4 INJECTION, SOLUTION INTRAVENOUS
Status: COMPLETED | OUTPATIENT
Start: 2024-01-28 | End: 2024-01-28

## 2024-01-28 RX ORDER — IBUPROFEN 200 MG
16 TABLET ORAL
Status: DISCONTINUED | OUTPATIENT
Start: 2024-01-28 | End: 2024-02-02 | Stop reason: HOSPADM

## 2024-01-28 RX ORDER — METOPROLOL TARTRATE 25 MG/1
25 TABLET, FILM COATED ORAL 2 TIMES DAILY
Status: DISCONTINUED | OUTPATIENT
Start: 2024-01-28 | End: 2024-02-02 | Stop reason: HOSPADM

## 2024-01-28 RX ORDER — ONDANSETRON HYDROCHLORIDE 2 MG/ML
4 INJECTION, SOLUTION INTRAVENOUS EVERY 8 HOURS PRN
Status: DISCONTINUED | OUTPATIENT
Start: 2024-01-28 | End: 2024-02-02 | Stop reason: HOSPADM

## 2024-01-28 RX ORDER — SODIUM CHLORIDE 0.9 % (FLUSH) 0.9 %
10 SYRINGE (ML) INJECTION EVERY 12 HOURS PRN
Status: DISCONTINUED | OUTPATIENT
Start: 2024-01-28 | End: 2024-02-02 | Stop reason: HOSPADM

## 2024-01-28 RX ORDER — NALOXONE HCL 0.4 MG/ML
0.02 VIAL (ML) INJECTION
Status: DISCONTINUED | OUTPATIENT
Start: 2024-01-28 | End: 2024-02-02 | Stop reason: HOSPADM

## 2024-01-28 RX ADMIN — IOHEXOL 75 ML: 350 INJECTION, SOLUTION INTRAVENOUS at 05:01

## 2024-01-28 RX ADMIN — LABETALOL HYDROCHLORIDE 10 MG: 5 INJECTION INTRAVENOUS at 04:01

## 2024-01-28 RX ADMIN — ONDANSETRON 4 MG: 2 INJECTION INTRAMUSCULAR; INTRAVENOUS at 02:01

## 2024-01-28 RX ADMIN — SODIUM CHLORIDE 500 ML: 0.9 INJECTION, SOLUTION INTRAVENOUS at 07:01

## 2024-01-28 RX ADMIN — CEFEPIME 1 G: 1 INJECTION, POWDER, FOR SOLUTION INTRAMUSCULAR; INTRAVENOUS at 08:01

## 2024-01-28 NOTE — ED PROVIDER NOTES
"Encounter Date: 1/28/2024       History     Chief Complaint   Patient presents with    Emesis     N/V off and on since this morning. Nursing home reports last emesis seemed to be coffee ground in color. Presents awake, alert. C/o abdominal pain that began this morning.     Patient is an 88-year-old female who complains of vomiting multiple times throughout the day today.  She says the vomit has been nonbloody but described as "dark".  She also has diffuse abdominal pain.  No lightheadedness or weakness.  No fever.      Review of patient's allergies indicates:   Allergen Reactions    Adhesive Itching and Blisters    Penicillins Anaphylaxis    Tramadol Hives    Avelox [moxifloxacin] Rash     Facial and arm itching and redness. Pt states throat closes when given.    Amoxil [amoxicillin]     Aspridrox [aspirin, buffered]     Codeine Other (See Comments)     Throat swelling    Keflex [cephalexin]      Tolerated cefepime and cefazolin    Norvasc [amlodipine]     Red dye Hives    Robitussin [guaifenesin]     Sulfa (sulfonamide antibiotics)     Tylenol [acetaminophen]      Has reaction to Tylenol with red dye and unable to take Extra Strength Tylenol/ CAN ONLY TOLERATE REG STRENGTH TYLENOL    Vicks vaporub [camphor-eucalyptus oil-menthol]      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     Past Surgical History:   Procedure Laterality Date    APPENDECTOMY      BACK SURGERY  1988    vertebral fracture    BACK SURGERY  02/2013    lumbar L2-5    " CATARACT EXTRACTION, BILATERAL Bilateral     CHOLECYSTECTOMY      open    ESOPHAGOGASTRODUODENOSCOPY N/A 2/15/2023    Procedure: EGD (ESOPHAGOGASTRODUODENOSCOPY);  Surgeon: Keith Chavarria MD;  Location: [facility] ENDO;  Service: Endoscopy;  Laterality: N/A;    EYE SURGERY Bilateral     cataract removal with lens implant    HYSTERECTOMY      PERCUTANEOUS TRANSLUMINAL ANGIOPLASTY (PTA) OF PERIPHERAL VESSEL N/A 2/3/2020    Procedure: PTA, PERIPHERAL VESSEL;  Surgeon: Timmy Simmons MD;  Location: [facility] CATH LAB/EP;  Service: Cardiology;  Laterality: N/A;    PORTACATH PLACEMENT Right 09/2016    RENAL ARTERY STENT Left 07/19/2017    ROBOT-ASSISTED LAPAROSCOPIC REPAIR OF VENTRAL HERNIA N/A 3/23/2023    Procedure: ROBOTIC REPAIR, HERNIA, VENTRAL;  Surgeon: Chris Johnson MD;  Location: [facility] OR;  Service: General;  Laterality: N/A;  Robotic Parastomal hernia repair    SIGMOIDECTOMY  10/29/2019    SMALL INTESTINE SURGERY  08/23/2016    THROMBECTOMY N/A 2/3/2020    Procedure: THROMBECTOMY;  Surgeon: Timmy Simmons MD;  Location: [facility] CATH LAB/EP;  Service: Cardiology;  Laterality: N/A;    TONSILLECTOMY      VAGINAL HYSTERECTOMY W/ ANTERIOR AND POSTERIOR VAGINAL REPAIR       Family History   Problem Relation Age of Onset    Pancreatic cancer Mother     Hypertension Father     Heart attack Father      Social History     Tobacco Use    Smoking status: Never    Smokeless tobacco: Never   Substance Use Topics    Alcohol use: No    Drug use: No     Review of Systems   Constitutional:  Negative for fever.   Gastrointestinal:  Positive for abdominal pain, nausea and vomiting.   Neurological:  Negative for dizziness, syncope and light-headedness.       Physical Exam     Initial Vitals [01/28/24 1358]   BP Pulse Resp Temp SpO2   (!) 200/93 94 16 98 °F (36.7 °C) (!) 93 %      MAP       --         Physical Exam    Nursing note and vitals reviewed.  Constitutional: No distress.   Eyes: Conjunctivae are normal.   Neck: Neck  supple.   Cardiovascular:  Normal rate, regular rhythm and normal heart sounds.           Pulmonary/Chest: Breath sounds normal.   Abdominal: Abdomen is soft.   Abdomen is soft with mild diffuse tenderness without guarding or rebound.  Colostomy bag without stool.   Musculoskeletal:         General: No edema.      Cervical back: Neck supple.     Neurological: She is alert and oriented to person, place, and time.   Skin: Skin is warm and dry.   Erythema noted to the left side of the abdomen.         ED Course   Procedures  Labs Reviewed   COMPREHENSIVE METABOLIC PANEL - Abnormal; Notable for the following components:       Result Value    Chloride 93 (*)     Glucose 150 (*)     eGFR 55 (*)     All other components within normal limits   TSH - Abnormal; Notable for the following components:    TSH 0.379 (*)     All other components within normal limits   CBC W/ AUTO DIFFERENTIAL - Abnormal; Notable for the following components:    RBC 3.56 (*)     Hemoglobin 11.5 (*)     Hematocrit 33.6 (*)     MCH 32.3 (*)     Gran # (ANC) 8.0 (*)     Gran % 83.1 (*)     Lymph % 10.3 (*)     All other components within normal limits   URINALYSIS, REFLEX TO URINE CULTURE - Abnormal; Notable for the following components:    Appearance, UA Hazy (*)     Protein, UA 1+ (*)     Occult Blood UA Trace (*)     Nitrite, UA Positive (*)     Leukocytes, UA 3+ (*)     All other components within normal limits    Narrative:     Specimen Source->Urine   URINALYSIS MICROSCOPIC - Abnormal; Notable for the following components:    RBC, UA 8 (*)     WBC, UA >100 (*)     Bacteria Moderate (*)     All other components within normal limits    Narrative:     Specimen Source->Urine   POCT GLUCOSE - Abnormal; Notable for the following components:    POCT Glucose 140 (*)     All other components within normal limits   POCT GLUCOSE - Abnormal; Notable for the following components:    POCT Glucose 133 (*)     All other components within normal limits   CULTURE,  URINE   MAGNESIUM   LIPASE   PROTIME-INR   T4, FREE   LACTIC ACID, PLASMA    Narrative:     Collection has been rescheduled by ALS at 01/28/2024 17:12 Reason: Pt   in CT   POCT GLUCOSE MONITORING CONTINUOUS        ECG Results              EKG 12-lead (In process)  Result time 01/29/24 09:39:21      In process by Interface, Lab In [facility] (01/29/24 09:39:21)                   Narrative:    Test Reason : I10,    Vent. Rate : 095 BPM     Atrial Rate : 095 BPM     P-R Int : 140 ms          QRS Dur : 084 ms      QT Int : 348 ms       P-R-T Axes : 023 013 063 degrees     QTc Int : 437 ms    Normal sinus rhythm with sinus arrhythmia  Normal ECG  When compared with ECG of 01-JAN-2024 11:48,  No significant change was found    Referred By: [email or web address]   SELF           Confirmed By:                                   Imaging Results              XR NG/OG tube placement check, non-radiologist performed (Final result)  Result time 01/28/24 19:50:41      Final result by Richard Romero DO (01/28/24 19:50:41)                   Impression:      Nasogastric tube as above.      Electronically signed by: Richard Romero  Date:    01/28/2024  Time:    19:50               Narrative:    EXAMINATION:  XR NG/OG TUBE PLACEMENT CHECK, NON-RADIOLOGIST PERFORMED    CLINICAL HISTORY:  ng tube placement verification;    TECHNIQUE:  AP radiograph of the abdomen.    COMPARISON:  12/16/2023.    FINDINGS:  There is a nasogastric tube with the tip and side-port in the stomach.  There are surgical clips in the right upper quadrant.  There is a left renal vascular stent.  Partially visualized bowel gas pattern is nonspecific.  Lung bases are clear.  Osseous structures demonstrate degenerative changes.                                       CT Abdomen Pelvis With IV Contrast NO Oral Contrast (Final result)  Result time 01/28/24 17:35:51      Final result by Richard Romero DO (01/28/24 17:35:51)                   Impression:      1. Distended loops of  small bowel with a transition point in the mid abdomen, at an identical location to prior CT dated 12/15/2023.  Findings are concerning for partial small bowel obstruction.  2. Postoperative changes of partial small and large bowel resection with a left lower quadrant colostomy.  3. Small hiatal hernia with mild wall thickening of the distal esophageal wall and minimal paraesophageal fluid.  Further evaluation with EGD as clinically indicated.      Electronically signed by: Richard Romero  Date:    01/28/2024  Time:    17:35               Narrative:    EXAMINATION:  CT ABDOMEN PELVIS WITH IV CONTRAST    CLINICAL HISTORY:  Bowel obstruction suspected;    TECHNIQUE:  Axial CT images with sagittal and coronal reformats were obtained of the abdomen and pelvis from the hemidiaphragms through the symphysis pubis after the administration of 75mL Omnipaque 350.    COMPARISON:  CT of the abdomen and pelvis from 12/15/2023.    FINDINGS:  Lung Bases: Clear.    Heart: Heart size is normal.  No pericardial effusion.    Distal esophagus: There is a small hiatal hernia and thickening of the distal esophageal wall, with minimal paraesophageal fluid (series 2, image 4).    Liver: The liver is normal in size and demonstrates homogeneous enhancement without focal lesion.  The portal vasculature is patent.    Biliary tract: Continued mild intra and extrahepatic biliary ductal dilation, unchanged, likely related to cholecystectomy changes.    Gallbladder: Surgically absent.    Pancreas: Normal. No pancreatic ductal dilatation.    Spleen: Normal size without focal lesion.    Adrenals: Unremarkable.    Kidneys and urinary collecting systems: Normal.  No hydronephrosis or urolithiasis.    Lymph nodes: None enlarged.    Stomach and bowel: There are postoperative changes of partial small bowel resection with an anastomosis in the mid abdomen.  There are postop changes of partial colectomy with a left lower quadrant colostomy and a Lawson's  pouch.  There are distended loops of small bowel measuring up to approximately 3.4 mm, with a transition point in the mid abdomen (series 601, image 51).  The transition point is at an identical position to prior CT dated 12/15/2023, located just proximal to the anastomosis.  There is continued slight twisting of the mesentery at this location.  There is no evidence of bowel wall thickening or pneumatosis.  The appendix is not definitely seen.    Peritoneum and mesentery: No ascites or free intraperitoneal air.  No abdominal fluid collection.    Vasculature: There is moderate atherosclerosis.  There is no aneurysm.  There is a left iliac venous stent.    Urinary bladder: No wall thickening.    Reproductive organs: The uterus is absent.    Body wall: Remote surgical incision noted along the anterior abdominal wall.    Musculoskeletal: No aggressive osseous lesion.  There are several remote compression fractures involving the T11, L2, and L4 vertebral bodies, with unchanged height loss from prior.  There are postoperative changes of L4 laminectomies.                                       X-Ray Chest AP Portable (Final result)  Result time 01/28/24 17:10:41      Final result by Richard Romero DO (01/28/24 17:10:41)                   Impression:      No acute abnormality.      Electronically signed by: Richard Romero  Date:    01/28/2024  Time:    17:10               Narrative:    EXAMINATION:  XR CHEST AP PORTABLE    CLINICAL HISTORY:  Shortness of breath    TECHNIQUE:  Single frontal view of the chest was performed.    COMPARISON:  03/25/2023.    FINDINGS:  The lungs are well expanded and clear. No focal opacities are seen. The pleural spaces are clear. The cardiac silhouette is unremarkable.  There are calcifications of the aortic arch.  The visualized osseous structures a demonstrate degenerative changes.  There are right upper quadrant surgical clips.                                       Medications   sodium  chloride 0.9% flush 10 mL (has no administration in time range)   naloxone 0.4 mg/mL injection 0.02 mg (has no administration in time range)   glucose chewable tablet 16 g (has no administration in time range)   glucose chewable tablet 24 g (has no administration in time range)   glucagon (human recombinant) injection 1 mg (has no administration in time range)   dextrose 10% bolus 125 mL 125 mL (has no administration in time range)   dextrose 10% bolus 250 mL 250 mL (has no administration in time range)   ceFEPIme (MAXIPIME) 1 g in dextrose 5 % in water (D5W) 100 mL IVPB (MB+) (0 g Intravenous Stopped 1/29/24 0927)   levothyroxine tablet 125 mcg (125 mcg Oral Given 1/29/24 0857)   metoprolol tartrate (LOPRESSOR) tablet 25 mg (25 mg Oral Given 1/29/24 0857)   pantoprazole EC tablet 40 mg (40 mg Oral Given 1/29/24 0857)   pravastatin tablet 20 mg (0 mg Oral Hold 1/28/24 2100)   ondansetron injection 4 mg (has no administration in time range)   glucagon (human recombinant) injection 1 mg (has no administration in time range)   dextrose 10% bolus 125 mL 125 mL (has no administration in time range)   dextrose 10% bolus 250 mL 250 mL (has no administration in time range)   influenza 65up-adj (QUADRIVALENT ADJUVANTED PF) vaccine 0.5 mL (has no administration in time range)   0.9%  NaCl infusion ( Intravenous Verify Only 1/29/24 1030)   ondansetron injection 4 mg (4 mg Intravenous Given 1/28/24 1441)   labetaloL injection 10 mg (10 mg Intravenous Given 1/28/24 1611)   iohexoL (OMNIPAQUE 350) injection 75 mL (75 mLs Intravenous Given 1/28/24 1708)   0.9%  NaCl infusion (500 mLs Intravenous New Bag 1/28/24 1914)   diatrizoate meglumineand-diatrizoate sodium (GASTROVIEW) solution 100 mL (100 mLs Per NG tube Given 1/29/24 0858)     Medical Decision Making  DDx :  Including but not limited to :  Gastritis, peptic ulcer disease, food poisoning, small-bowel obstruction, pancreatitis.    Emergent evaluation an 88-year-old female  with multiple episodes of vomiting today.  Patient has a history of small-bowel obstructions in the past.  Results CT scan to rule out small-bowel obstruction of pending at the time of shift change and further care of the patient will be turned over to , pending results and final disposition.    Amount and/or Complexity of Data Reviewed  Labs: ordered. Decision-making details documented in ED Course.     Details: CBC with a hemoglobin of 11.5 and hematocrit of 33.6.  CMP with a glucose of 150, GFR of 55.  Lipase is normal.  Radiology: ordered.    Risk  Prescription drug management.  Decision regarding hospitalization.               ED Course as of 01/29/24 1925   Sun Jan 28, 2024 1649 POCT Glucose(!): 140 [LC]   1649 TSH(!): 0.379 [LC]   1649 Glucose(!): 150 [LC]   1649 Sodium: 137 [LC]   1649 Hemoglobin(!): 11.5 [LC]   1649 Hematocrit(!): 33.6 [LC]   1842 Case discussed with the on-call general surgeon, Dr. Kevin De Anda, who reviewed the case and images associated pertinent to it.  He recommends admission to the medicine service, placement of NG tube; will place a consultation to General surgery as the patient is followed by [facility] general surgery.  I discussed the case with the on-call [facility] hospitalist who will admit the patient to his service. [LC]   1843 ED workup results discussed, at length, with the patient's daughter bedside and with the patient.  They are both in agreement with plan for admission at this time. [LC]      ED Course User Index  [LC] Ezekiel Short MD                           Clinical Impression:  Final diagnoses:  [R11.2] Nausea and vomiting, unspecified vomiting type (Primary)  [I10] HTN (hypertension)  [R06.02] Shortness of breath  [K56.609] SBO (small bowel obstruction)          ED Disposition Condition    Admit                 Sp Gamble MD  01/29/24 1925

## 2024-01-28 NOTE — PHARMACY MED REC
"      [facility]           Pharmacy  Admission Medication History     The home medication history was taken by Marcia Tabares.      Medication history obtained from Medications listed below were obtained from: Nursing home    Based on information gathered for medication list, you may go to "Admission" then "Reconcile Home Medications" tabs to review and/or act upon those items.     The home medication list has been updated by the Pharmacy department.   Please read ALL comments highlighted in yellow.   Please address this information as you see fit.    Feel free to contact us if you have any questions or require assistance.        No current facility-administered medications on file prior to encounter.     Current Outpatient Medications on File Prior to Encounter   Medication Sig Dispense Refill    acetaminophen (TYLENOL) 325 MG tablet Take 2 tablets (650 mg total) by mouth every 4 (four) hours as needed for Pain.  0    apixaban (ELIQUIS) 2.5 mg Tab Take 1 tablet (2.5 mg total) by mouth 2 (two) times daily.      calcium-vitamin D 600 mg(1,500mg) -400 unit Tab Take 1 tablet by mouth once daily.      docusate sodium (COLACE) 100 MG capsule Take 1 capsule (100 mg total) by mouth 2 (two) times daily.  0    furosemide (LASIX) 20 MG tablet Take 20 mg by mouth once daily.      levothyroxine (SYNTHROID) 125 MCG tablet TAKE 1 TABLET (125 MCG TOTAL) BY MOUTH ONCE DAILY. 90 tablet 3    losartan (COZAAR) 25 MG tablet Take 25 mg by mouth once daily.      magnesium oxide (MAG-OX) 400 mg (241.3 mg magnesium) tablet Take 2 tablets (800 mg total) by mouth once daily.  0    metoprolol tartrate (LOPRESSOR) 25 MG tablet Take 25 mg by mouth 2 (two) times daily.      ondansetron (ZOFRAN) 4 MG tablet Take 4 mg by mouth every 6 (six) hours as needed for Nausea (vomiting).      OXcarbazepine (TRILEPTAL) 150 MG Tab Take 150 mg by mouth nightly.      pantoprazole (PROTONIX) 40 MG tablet Take 40 mg by mouth once daily.   "    PHENobarbitaL 97.2 MG tablet Take 97.2 mg by mouth every evening.      polyethylene glycol (GLYCOLAX) 17 gram PwPk Take 17 g by mouth once daily. 30 packet 5    potassium chloride SA (K-DUR,KLOR-CON M) 10 MEQ tablet Take 1 tablet (10 mEq total) by mouth once daily. 120 tablet 0    pravastatin (PRAVACHOL) 20 MG tablet Take 20 mg by mouth every evening.      vitamin D (VITAMIN D3) 1000 units Tab Take 1,000 Units by mouth once daily.         Please address this information as you see fit.  Feel free to contact us if you have any questions or require assistance.    Marcia Tabares  [phone]            .

## 2024-01-28 NOTE — ED NOTES
Daughter at bedside. Updated on plan of care. Resting in bed comfortably. Instructed to call when she needs to void for RN to obtain a urine sample.   Side rails up x 2. Call light within reach.

## 2024-01-28 NOTE — ED NOTES
Patient arrived to ED via EMS from [facility] with complaints of brown and red emesis x 1 day. Patient reports eating okra last night and then not feeling well. Woke up and had about 13-15 bouts of emesis. Complaints of slight nausea. Unable to tolerate PO today. No active vomiting at this time. Complaints of diffuse abdominal pain. Has colostomy in place. No output. Redness around bag. Patient reports she gets that on occasion when it itches. Denies fever, CP, SOB, diarrhea or changes in bowel output. Patient awake, alert, oriented x 4. Shallow breathing. Does not wear oxygen at home. Report from EMS patient's oxygen was in the 80's and patient placed on 3 L NC.     APPEARANCE: Alert, oriented   CARDIAC: Normal rate and rhythm, no murmur heard.   PERIPHERAL VASCULAR: peripheral pulses present. Normal cap refill. + 1 BLE edema.     RESPIRATORY: shallow, diminished  GASTRO: rounded, hypoactive bowel sounds. No output in colostomy bag, diffuse tenderness. Nausea.   MUSC: Full ROM. No bony tenderness or soft tissue tenderness. No obvious deformity.  SKIN: skin cool and pale, MM moist.   NEURO: 4/4 strength major flexors/extensors bilaterally. Sensory intact to light touch bilaterally. Lawrence coma scale: eyes open spontaneously-4, oriented & converses-5, obeys commands-6. No neurological abnormalities.   MENTAL STATUS: awake, alert and aware of environment.  EYE: PERRL, both eyes: pupils brisk and reactive to light. Normal size.  ENT: EARS: no obvious drainage. NOSE: no active bleeding.

## 2024-01-28 NOTE — ED PROVIDER NOTES
==== ASSUMPTION OF CARE NOTE ====    Care of patient assumed by self, from Dr. Sp Gamble, as of shift change.      Patient presented with complaint of abdominal pain, multiple episodes of emesis; she does have a history of small-bowel obstruction x2; initial workup by Dr. Gamble reveal no acute abnormalities with her laboratory analysis.  As of shift change, CT of the abdomen pelvis was ordered in his pending.    Per EMS, patient with O2 sat of 89% on room air on their arrival; she required supplemental )2 administration (3L) with improvement of her O2 sat to 100%. She denies any current complaints of SOB and/or abdominal pain on my evaluation.     CT A/P demonstrates: Distended loops of small bowel with a transition point in the mid abdomen, at an identical location to prior CT dated 12/15/2023.  Findings are concerning for partial small bowel obstruction.    (Per the body of the report: There are postoperative changes of partial small bowel resection with an anastomosis in the mid abdomen.  There are postop changes of partial colectomy with a left lower quadrant colostomy and a Lawson's pouch.  There are distended loops of small bowel measuring up to approximately 3.4 mm, with a transition point in the mid abdomen (series 601, image 51).  The transition point is at an identical position to prior CT dated 12/15/2023, located just proximal to the anastomosis.  There is continued slight twisting of the mesentery at this location.  There is no evidence of bowel wall thickening or pneumatosis.  The appendix is not definitely seen.   2. Postoperative changes of partial small and large bowel resection with a left lower quadrant colostomy.  3. Small hiatal hernia with mild wall thickening of the distal esophageal wall and minimal paraesophageal fluid.  Further evaluation with EGD as clinically indicated.    Lactic WNL    CXR negative    Per chart check, pt with history of appendectomy, cholecystectomy hysterectomy,  history of small bowel obstruction status post small bowel resection on 8/23/16, history of sigmoid colectomy with end colostomy on 10/29/19 with Dr. Johnson.     I discussed case with the on-call general surgeon, Dr. Kevin De Anda, who reviewed the case, images, pertinent labs and recommends NG tube placement.  He requested that we admit the patient to the medicine service as she had been admitted to the medicine service in the past for the same bowel obstruction.  As such, I discussed the case with the on-call [facility] hospitalist who is graciously agreed to admit the patient to his service for further evaluation management.  An NG tube will be placed in the ED.  The patient and the daughter bedside have been updated or guarding the ED workup findings and the need for admission at this time.  They are both comfortable with this plan.      Ezekiel Short MD, [facility]   Emergency Medicine                 Ezekiel Short MD  01/28/24 2035

## 2024-01-28 NOTE — ED NOTES
Patient sleeping resting comfortably in bed. Lights on per patient request. Call light within reach. Covered with warm blanket. On the cardiac monitor.

## 2024-01-29 LAB
ALBUMIN SERPL BCP-MCNC: 3.6 G/DL (ref 3.5–5.2)
ALP SERPL-CCNC: 83 U/L (ref 55–135)
ALT SERPL W/O P-5'-P-CCNC: 15 U/L (ref 10–44)
ANION GAP SERPL CALC-SCNC: 13 MMOL/L (ref 8–16)
AST SERPL-CCNC: 14 U/L (ref 10–40)
BASOPHILS # BLD AUTO: 0.02 K/UL (ref 0–0.2)
BASOPHILS NFR BLD: 0.2 % (ref 0–1.9)
BILIRUB SERPL-MCNC: 0.3 MG/DL (ref 0.1–1)
BUN SERPL-MCNC: 14 MG/DL (ref 8–23)
CALCIUM SERPL-MCNC: 9.3 MG/DL (ref 8.7–10.5)
CHLORIDE SERPL-SCNC: 99 MMOL/L (ref 95–110)
CO2 SERPL-SCNC: 28 MMOL/L (ref 23–29)
CREAT SERPL-MCNC: 0.9 MG/DL (ref 0.5–1.4)
DIFFERENTIAL METHOD BLD: ABNORMAL
EOSINOPHIL # BLD AUTO: 0 K/UL (ref 0–0.5)
EOSINOPHIL NFR BLD: 0.4 % (ref 0–8)
ERYTHROCYTE [DISTWIDTH] IN BLOOD BY AUTOMATED COUNT: 13.9 % (ref 11.5–14.5)
EST. GFR  (NO RACE VARIABLE): >60 ML/MIN/1.73 M^2
GLUCOSE SERPL-MCNC: 110 MG/DL (ref 70–110)
GLUCOSE SERPL-MCNC: 115 MG/DL (ref 70–110)
HCT VFR BLD AUTO: 32.6 % (ref 37–48.5)
HGB BLD-MCNC: 10.8 G/DL (ref 12–16)
IMM GRANULOCYTES # BLD AUTO: 0.03 K/UL (ref 0–0.04)
IMM GRANULOCYTES NFR BLD AUTO: 0.3 % (ref 0–0.5)
LYMPHOCYTES # BLD AUTO: 1.4 K/UL (ref 1–4.8)
LYMPHOCYTES NFR BLD: 13.2 % (ref 18–48)
MAGNESIUM SERPL-MCNC: 1.9 MG/DL (ref 1.6–2.6)
MCH RBC QN AUTO: 32.8 PG (ref 27–31)
MCHC RBC AUTO-ENTMCNC: 33.1 G/DL (ref 32–36)
MCV RBC AUTO: 99 FL (ref 82–98)
MONOCYTES # BLD AUTO: 0.9 K/UL (ref 0.3–1)
MONOCYTES NFR BLD: 8.7 % (ref 4–15)
NEUTROPHILS # BLD AUTO: 8.2 K/UL (ref 1.8–7.7)
NEUTROPHILS NFR BLD: 77.2 % (ref 38–73)
NRBC BLD-RTO: 0 /100 WBC
PLATELET # BLD AUTO: 183 K/UL (ref 150–450)
PMV BLD AUTO: 9.9 FL (ref 9.2–12.9)
POCT GLUCOSE: 102 MG/DL (ref 70–110)
POCT GLUCOSE: 110 MG/DL (ref 70–110)
POCT GLUCOSE: 128 MG/DL (ref 70–110)
POCT GLUCOSE: 129 MG/DL (ref 70–110)
POTASSIUM SERPL-SCNC: 3.7 MMOL/L (ref 3.5–5.1)
PROT SERPL-MCNC: 6.8 G/DL (ref 6–8.4)
RBC # BLD AUTO: 3.29 M/UL (ref 4–5.4)
SODIUM SERPL-SCNC: 140 MMOL/L (ref 136–145)
WBC # BLD AUTO: 10.59 K/UL (ref 3.9–12.7)

## 2024-01-29 PROCEDURE — 99900035 HC TECH TIME PER 15 MIN (STAT)

## 2024-01-29 PROCEDURE — 27000221 HC OXYGEN, UP TO 24 HOURS

## 2024-01-29 PROCEDURE — 85025 COMPLETE CBC W/AUTO DIFF WBC: CPT | Performed by: INTERNAL MEDICINE

## 2024-01-29 PROCEDURE — 83735 ASSAY OF MAGNESIUM: CPT | Performed by: INTERNAL MEDICINE

## 2024-01-29 PROCEDURE — 99222 1ST HOSP IP/OBS MODERATE 55: CPT | Mod: ,,, | Performed by: STUDENT IN AN ORGANIZED HEALTH CARE EDUCATION/TRAINING PROGRAM

## 2024-01-29 PROCEDURE — 63600175 PHARM REV CODE 636 W HCPCS: Performed by: INTERNAL MEDICINE

## 2024-01-29 PROCEDURE — 25500020 PHARM REV CODE 255: Performed by: STUDENT IN AN ORGANIZED HEALTH CARE EDUCATION/TRAINING PROGRAM

## 2024-01-29 PROCEDURE — 25000003 PHARM REV CODE 250: Performed by: STUDENT IN AN ORGANIZED HEALTH CARE EDUCATION/TRAINING PROGRAM

## 2024-01-29 PROCEDURE — 94760 N-INVAS EAR/PLS OXIMETRY 1: CPT

## 2024-01-29 PROCEDURE — 80053 COMPREHEN METABOLIC PANEL: CPT | Performed by: INTERNAL MEDICINE

## 2024-01-29 PROCEDURE — 11000001 HC ACUTE MED/SURG PRIVATE ROOM

## 2024-01-29 PROCEDURE — 36415 COLL VENOUS BLD VENIPUNCTURE: CPT | Performed by: INTERNAL MEDICINE

## 2024-01-29 PROCEDURE — 25000003 PHARM REV CODE 250: Performed by: INTERNAL MEDICINE

## 2024-01-29 RX ORDER — SODIUM CHLORIDE 9 MG/ML
INJECTION, SOLUTION INTRAVENOUS
Status: DISCONTINUED | OUTPATIENT
Start: 2024-01-29 | End: 2024-02-02 | Stop reason: HOSPADM

## 2024-01-29 RX ADMIN — SODIUM CHLORIDE: 9 INJECTION, SOLUTION INTRAVENOUS at 08:01

## 2024-01-29 RX ADMIN — METOPROLOL TARTRATE 25 MG: 25 TABLET, FILM COATED ORAL at 08:01

## 2024-01-29 RX ADMIN — CEFEPIME 1 G: 1 INJECTION, POWDER, FOR SOLUTION INTRAMUSCULAR; INTRAVENOUS at 08:01

## 2024-01-29 RX ADMIN — LEVOTHYROXINE SODIUM 125 MCG: 25 TABLET ORAL at 08:01

## 2024-01-29 RX ADMIN — DIATRIZOATE MEGLUMINE AND DIATRIZOATE SODIUM 100 ML: 660; 100 LIQUID ORAL; RECTAL at 08:01

## 2024-01-29 RX ADMIN — PRAVASTATIN SODIUM 20 MG: 10 TABLET ORAL at 08:01

## 2024-01-29 RX ADMIN — PANTOPRAZOLE SODIUM 40 MG: 40 TABLET, DELAYED RELEASE ORAL at 08:01

## 2024-01-29 NOTE — ED NOTES
MD at bedside speaking to patient and daughter about plan of care. Patient informed about NPO status. Denies pain and nausea at this time.

## 2024-01-29 NOTE — H&P
[facility] Emergency [facility] Medicine  History & Physical    Patient Name: Annette Garcia  MRN: [id number]  Patient Class: IP- Inpatient  Admission Date: 1/28/2024  Attending Physician: Alice Barker*   Primary Care Provider: [facility]         Patient information was obtained from patient and ER records.     Subjective:     Principal Problem:Small bowel obstruction    Chief Complaint:   Chief Complaint   Patient presents with    Emesis     N/V off and on since this morning. Nursing home reports last emesis seemed to be coffee ground in color. Presents awake, alert. C/o abdominal pain that began this morning.        HPI: 88 years old female with past medical history significant for HTN, seizure disorder, afib on eliquis, LLE DVT, May-Thurner Syndrome, SBO s/p colostomy, and osteoporosis, who came to ER because of vomiting and abdominal pain since yesterday.  Patient reported she had multiple episodes of nonbloody vomiting since yesterday.  Patient reported she has been having abdominal pain since yesterday which is localized to central abdomen, 6-7/10 in severity and nonradiating.  Patient reported she has history of small-bowel obstruction.  Patient also reported feeling nauseous.  Patient denied any other symptoms including chest pain shortness of breath diarrhea fever chills cough or dizziness. CT A&P showed ''distended loops of small bowel with a transition point in the mid abdomen, at an identical location to prior CT dated 12/15/2023.  Findings are concerning for partial small bowel obstruction. Postoperative changes of partial small and large bowel resection with a left lower quadrant colostomy. Small hiatal hernia with mild wall thickening of the distal esophageal wall and minimal paraesophageal fluid''. Chest xray negative for any acute abnormality. US showed UTI. Patient received IV fluid and general surgery has been consulted in ER.  In the ER, blood pressure was  200/93 mm Hg, patient received IV labetalol 10 mg, most recent blood pressure is 147/74 mm Hg.  I also spoke to patient's daughter at bedside.      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       Past Surgical History:   Procedure Laterality Date    APPENDECTOMY      BACK SURGERY  1988    vertebral fracture    BACK SURGERY  02/2013    lumbar L2-5    CATARACT EXTRACTION, BILATERAL Bilateral     CHOLECYSTECTOMY      open    ESOPHAGOGASTRODUODENOSCOPY N/A 2/15/2023    Procedure: EGD (ESOPHAGOGASTRODUODENOSCOPY);  Surgeon: Keith Chavarria MD;  Location: [facility] ENDO;  Service: Endoscopy;  Laterality: N/A;    EYE SURGERY Bilateral     cataract removal with lens implant    HYSTERECTOMY      PERCUTANEOUS TRANSLUMINAL ANGIOPLASTY (PTA) OF PERIPHERAL VESSEL N/A 2/3/2020    Procedure: PTA, PERIPHERAL VESSEL;  Surgeon: Timmy Simmons MD;  Location: [facility] CATH LAB/EP;  Service: Cardiology;  Laterality: N/A;    PORTACATH PLACEMENT Right 09/2016    RENAL ARTERY STENT Left 07/19/2017    ROBOT-ASSISTED LAPAROSCOPIC REPAIR OF VENTRAL HERNIA N/A 3/23/2023    Procedure: ROBOTIC REPAIR, HERNIA, VENTRAL;  Surgeon: Chris Johnson MD;  Location: [facility] OR;  Service: General;  Laterality: N/A;  Robotic Parastomal hernia repair    SIGMOIDECTOMY  10/29/2019    SMALL INTESTINE SURGERY  08/23/2016    THROMBECTOMY N/A 2/3/2020    Procedure: THROMBECTOMY;  Surgeon: Timmy Simmons MD;  Location: [facility] CATH LAB/EP;  Service: Cardiology;  Laterality: N/A;     TONSILLECTOMY      VAGINAL HYSTERECTOMY W/ ANTERIOR AND POSTERIOR VAGINAL REPAIR         Review of patient's allergies indicates:   Allergen Reactions    Adhesive Itching and Blisters    Penicillins Anaphylaxis    Tramadol Hives    Avelox [moxifloxacin] Rash     Facial and arm itching and redness. Pt states throat closes when given.    Amoxil [amoxicillin]     Aspridrox [aspirin, buffered]     Codeine Other (See Comments)     Throat swelling    Keflex [cephalexin]      Tolerated cefepime and cefazolin    Norvasc [amlodipine]     Red dye Hives    Robitussin [guaifenesin]     Sulfa (sulfonamide antibiotics)     Tylenol [acetaminophen]      Has reaction to Tylenol with red dye and unable to take Extra Strength Tylenol/ CAN ONLY TOLERATE REG STRENGTH TYLENOL    Vicks vaporub [camphor-eucalyptus oil-menthol]        No current facility-administered medications on file prior to encounter.     Current Outpatient Medications on File Prior to Encounter   Medication Sig    acetaminophen (TYLENOL) 325 MG tablet Take 2 tablets (650 mg total) by mouth every 4 (four) hours as needed for Pain.    apixaban (ELIQUIS) 2.5 mg Tab Take 1 tablet (2.5 mg total) by mouth 2 (two) times daily.    calcium-vitamin D 600 mg(1,500mg) -400 unit Tab Take 1 tablet by mouth once daily.    docusate sodium (COLACE) 100 MG capsule Take 1 capsule (100 mg total) by mouth 2 (two) times daily.    furosemide (LASIX) 20 MG tablet Take 20 mg by mouth once daily.    levothyroxine (SYNTHROID) 125 MCG tablet TAKE 1 TABLET (125 MCG TOTAL) BY MOUTH ONCE DAILY.    losartan (COZAAR) 25 MG tablet Take 25 mg by mouth once daily.    magnesium oxide (MAG-OX) 400 mg (241.3 mg magnesium) tablet Take 2 tablets (800 mg total) by mouth once daily.    metoprolol tartrate (LOPRESSOR) 25 MG tablet Take 25 mg by mouth 2 (two) times daily.    ondansetron (ZOFRAN) 4 MG tablet Take 4 mg by mouth every 6 (six) hours as needed for Nausea (vomiting).    OXcarbazepine (TRILEPTAL)  150 MG Tab Take 150 mg by mouth nightly.    pantoprazole (PROTONIX) 40 MG tablet Take 40 mg by mouth once daily.    PHENobarbitaL 97.2 MG tablet Take 97.2 mg by mouth every evening.    polyethylene glycol (GLYCOLAX) 17 gram PwPk Take 17 g by mouth once daily.    potassium chloride SA (K-DUR,KLOR-CON M) 10 MEQ tablet Take 1 tablet (10 mEq total) by mouth once daily.    pravastatin (PRAVACHOL) 20 MG tablet Take 20 mg by mouth every evening.    vitamin D (VITAMIN D3) 1000 units Tab Take 1,000 Units by mouth once daily.     Family History       Problem Relation (Age of Onset)    Heart attack Father    Hypertension Father    Pancreatic cancer Mother          Tobacco Use    Smoking status: Never    Smokeless tobacco: Never   Substance and Sexual Activity    Alcohol use: No    Drug use: No    Sexual activity: Not Currently     Partners: Male     Review of Systems   Constitutional:  Negative for fever.   HENT:  Negative for congestion.    Eyes:  Negative for visual disturbance.   Respiratory:  Negative for cough and shortness of breath.    Cardiovascular:  Negative for chest pain and leg swelling.   Gastrointestinal:  Positive for abdominal pain, nausea and vomiting. Negative for diarrhea.   Genitourinary:  Negative for dysuria.   Musculoskeletal:  Negative for back pain.   Neurological:  Negative for syncope.   Psychiatric/Behavioral:  Negative for agitation.      Objective:     Vital Signs (Most Recent):  Temp: 98 °F (36.7 °C) (01/28/24 1358)  Pulse: 81 (01/28/24 1931)  Resp: 16 (01/28/24 1931)  BP: (!) 136/59 (01/28/24 1932)  SpO2: 98 % (01/28/24 1931) Vital Signs (24h Range):  Temp:  [98 °F (36.7 °C)] 98 °F (36.7 °C)  Pulse:  [76-94] 81  Resp:  [11-21] 16  SpO2:  [89 %-100 %] 98 %  BP: (136-216)/(59-93) 136/59     Weight: 67.2 kg (148 lb 2.4 oz)  Body mass index is 32.06 kg/m².     Physical Exam  Constitutional:       General: She is not in acute distress.     Appearance: She is ill-appearing.   HENT:      Head:  Normocephalic.      Nose: Nose normal.      Mouth/Throat:      Mouth: Mucous membranes are moist.   Eyes:      Extraocular Movements: Extraocular movements intact.   Cardiovascular:      Rate and Rhythm: Normal rate.   Pulmonary:      Effort: No respiratory distress.      Breath sounds: No wheezing or rales.   Abdominal:      Palpations: Abdomen is soft.      Tenderness: There is abdominal tenderness.      Comments: Diffuse central tenderness.  Colostomy +   Musculoskeletal:         General: No swelling.      Cervical back: Normal range of motion.   Skin:     General: Skin is warm.      Capillary Refill: Capillary refill takes less than 2 seconds.   Neurological:      Mental Status: She is alert and oriented to person, place, and time.   Psychiatric:         Behavior: Behavior normal.                Significant Labs: All pertinent labs within the past 24 hours have been reviewed.  CBC:   Recent Labs   Lab 01/28/24  1522   WBC 9.58   HGB 11.5*   HCT 33.6*        CMP:   Recent Labs   Lab 01/28/24  1439      K 4.6   CL 93*   CO2 29   *   BUN 16   CREATININE 1.0   CALCIUM 9.7   PROT 7.3   ALBUMIN 3.8   BILITOT 0.3   ALKPHOS 98   AST 23   ALT 16   ANIONGAP 15     Lactic Acid:   Recent Labs   Lab 01/28/24  1721   LACTATE 1.8     Lipase:   Recent Labs   Lab 01/28/24  1439   LIPASE 9     Urine Studies:   Recent Labs   Lab 01/28/24  1808   COLORU Yellow   APPEARANCEUA Hazy*   PHUR 7.0   SPECGRAV 1.015   PROTEINUA 1+*   GLUCUA Negative   KETONESU Negative   BILIRUBINUA Negative   OCCULTUA Trace*   NITRITE Positive*   UROBILINOGEN Negative   LEUKOCYTESUR 3+*   RBCUA 8*   WBCUA >100*   BACTERIA Moderate*   SQUAMEPITHEL 3   HYALINECASTS 0       Significant Imaging: I have reviewed all pertinent imaging results/findings within the past 24 hours.  Assessment/Plan:     * Small bowel obstruction  Patient received IV fluid in ER  NPO  NG tube placement  prn zofran for nausea control   Consult to general surgery        Hypertensive urgency  Patient has a current diagnosis of hypertensive urgency (without evidence of end organ damage) which is controlled.  Latest blood pressure and vitals reviewed-   Temp:  [98 °F (36.7 °C)]   Pulse:  [76-94]   Resp:  [11-21]   BP: (136-216)/(59-93)   SpO2:  [89 %-100 %] .   Patient currently off IV antihypertensives.   Home meds for hypertension were reviewed and noted below.   Hypertension Medications               furosemide (LASIX) 20 MG tablet Take 20 mg by mouth once daily.    losartan (COZAAR) 25 MG tablet Take 25 mg by mouth once daily.    metoprolol tartrate (LOPRESSOR) 25 MG tablet Take 25 mg by mouth 2 (two) times daily.            Medication adjustment for hospital antihypertensives is as follows- IV labetalol     Will aim for controlled BP reduction by medications noted above. Monitor and mitigate end organ damage as indicated.    UTI (urinary tract infection)  Urine culture pending   IV cefepime      A-fib  Patient with Persistent (7 days or more) atrial fibrillation which is controlled currently with Beta Blocker. Patient is currently in sinus rhythm.[id number] Score: 4. Anticoagulation indicated. Anticoagulation done with home med eliquis .    Hypothyroidism  Continue home med levothyroxine        VTE Risk Mitigation (From admission, onward)           Ordered     apixaban tablet 2.5 mg  2 times daily         01/28/24 1942     IP VTE HIGH RISK PATIENT  Once         01/1937     Place sequential compression device  Until discontinued         01/1937                                    Ishan Martinez MD  Department of Hospital Medicine  [facility] Emergency Dept

## 2024-01-29 NOTE — PLAN OF CARE
Problem: Adult Inpatient Plan of Care  Goal: Plan of Care Review  Outcome: Ongoing, Progressing     VIRTUAL NURSE:  Cued into patient's room.  Unable to view patient d/t camera malfunction.  Permission received per patient to review admit assessment questions.  Patient is hard of hearing.  Introduced as VN for night shift that will be working with floor nurse and nursing assistant.  Educated patient on VN's role in patient care and  VIP model.  Plan of care reviewed with patient.  Education per flowsheet.   Informed patient that staff will round on them every 2 hours but to use call light for any other needs they may have; informed of fall risk and fall precautions.  Patient verbalized understanding.  Opportunity given for questions and questions answered.  Patient denies complaints or any needs at this time. Instructed to call for assistance.  Will cont to monitor and intervene as needed.    Labs, notes, orders, and careplan initiated.       01/28/24 2460   Patient Request   Patient Requested unable to view patient d/t camera malfunction; no complaints or needs currently   Admission   Initial VN Admission Questions Complete   Communication Issues? Technical Issue;Patient Hearing   Shift   Virtual Nurse - Rounding Complete   Pain Management Interventions pain management plan reviewed with patient/caregiver   Virtual Nurse - Patient Verbalized Approval Of Camera Use;VN Rounding   Type of Frequent Check   Type Patient Rounds   Pain/Comfort/Sleep   Preferred Pain Scale number (Numeric Rating Pain Scale)   Comfort/Acceptable Pain Level 3   Pain Rating (0-10): Rest 0   Sleep/Rest/Relaxation no problem identified;awake

## 2024-01-29 NOTE — SUBJECTIVE & OBJECTIVE
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       Past Surgical History:   Procedure Laterality Date    APPENDECTOMY      BACK SURGERY  1988    vertebral fracture    BACK SURGERY  02/2013    lumbar L2-5    CATARACT EXTRACTION, BILATERAL Bilateral     CHOLECYSTECTOMY      open    ESOPHAGOGASTRODUODENOSCOPY N/A 2/15/2023    Procedure: EGD (ESOPHAGOGASTRODUODENOSCOPY);  Surgeon: Keith Chavarria MD;  Location: [facility] ENDO;  Service: Endoscopy;  Laterality: N/A;    EYE SURGERY Bilateral     cataract removal with lens implant    HYSTERECTOMY      PERCUTANEOUS TRANSLUMINAL ANGIOPLASTY (PTA) OF PERIPHERAL VESSEL N/A 2/3/2020    Procedure: PTA, PERIPHERAL VESSEL;  Surgeon: Timmy Simmons MD;  Location: [facility] CATH LAB/EP;  Service: Cardiology;  Laterality: N/A;    PORTACATH PLACEMENT Right 09/2016    RENAL ARTERY STENT Left 07/19/2017    ROBOT-ASSISTED LAPAROSCOPIC REPAIR OF VENTRAL HERNIA N/A 3/23/2023    Procedure: ROBOTIC REPAIR, HERNIA, VENTRAL;  Surgeon: Chris Johnson MD;  Location: [facility] OR;  Service: General;  Laterality: N/A;  Robotic Parastomal hernia repair    SIGMOIDECTOMY  10/29/2019    SMALL INTESTINE SURGERY  08/23/2016    THROMBECTOMY N/A 2/3/2020    Procedure: THROMBECTOMY;  Surgeon: Timmy Simmons MD;  Location: [facility] CATH LAB/EP;  Service: Cardiology;  Laterality: N/A;    TONSILLECTOMY      VAGINAL HYSTERECTOMY W/ ANTERIOR AND POSTERIOR VAGINAL REPAIR         Review of patient's allergies indicates:   Allergen  Reactions    Adhesive Itching and Blisters    Penicillins Anaphylaxis    Tramadol Hives    Avelox [moxifloxacin] Rash     Facial and arm itching and redness. Pt states throat closes when given.    Amoxil [amoxicillin]     Aspridrox [aspirin, buffered]     Codeine Other (See Comments)     Throat swelling    Keflex [cephalexin]      Tolerated cefepime and cefazolin    Norvasc [amlodipine]     Red dye Hives    Robitussin [guaifenesin]     Sulfa (sulfonamide antibiotics)     Tylenol [acetaminophen]      Has reaction to Tylenol with red dye and unable to take Extra Strength Tylenol/ CAN ONLY TOLERATE REG STRENGTH TYLENOL    Vicks vaporub [camphor-eucalyptus oil-menthol]        No current facility-administered medications on file prior to encounter.     Current Outpatient Medications on File Prior to Encounter   Medication Sig    acetaminophen (TYLENOL) 325 MG tablet Take 2 tablets (650 mg total) by mouth every 4 (four) hours as needed for Pain.    apixaban (ELIQUIS) 2.5 mg Tab Take 1 tablet (2.5 mg total) by mouth 2 (two) times daily.    calcium-vitamin D 600 mg(1,500mg) -400 unit Tab Take 1 tablet by mouth once daily.    docusate sodium (COLACE) 100 MG capsule Take 1 capsule (100 mg total) by mouth 2 (two) times daily.    furosemide (LASIX) 20 MG tablet Take 20 mg by mouth once daily.    levothyroxine (SYNTHROID) 125 MCG tablet TAKE 1 TABLET (125 MCG TOTAL) BY MOUTH ONCE DAILY.    losartan (COZAAR) 25 MG tablet Take 25 mg by mouth once daily.    magnesium oxide (MAG-OX) 400 mg (241.3 mg magnesium) tablet Take 2 tablets (800 mg total) by mouth once daily.    metoprolol tartrate (LOPRESSOR) 25 MG tablet Take 25 mg by mouth 2 (two) times daily.    ondansetron (ZOFRAN) 4 MG tablet Take 4 mg by mouth every 6 (six) hours as needed for Nausea (vomiting).    OXcarbazepine (TRILEPTAL) 150 MG Tab Take 150 mg by mouth nightly.    pantoprazole (PROTONIX) 40 MG tablet Take 40 mg by mouth once daily.    PHENobarbitaL 97.2 MG tablet  Take 97.2 mg by mouth every evening.    polyethylene glycol (GLYCOLAX) 17 gram PwPk Take 17 g by mouth once daily.    potassium chloride SA (K-DUR,KLOR-CON M) 10 MEQ tablet Take 1 tablet (10 mEq total) by mouth once daily.    pravastatin (PRAVACHOL) 20 MG tablet Take 20 mg by mouth every evening.    vitamin D (VITAMIN D3) 1000 units Tab Take 1,000 Units by mouth once daily.     Family History       Problem Relation (Age of Onset)    Heart attack Father    Hypertension Father    Pancreatic cancer Mother          Tobacco Use    Smoking status: Never    Smokeless tobacco: Never   Substance and Sexual Activity    Alcohol use: No    Drug use: No    Sexual activity: Not Currently     Partners: Male     Review of Systems   Constitutional:  Negative for fever.   HENT:  Negative for congestion.    Eyes:  Negative for visual disturbance.   Respiratory:  Negative for cough and shortness of breath.    Cardiovascular:  Negative for chest pain and leg swelling.   Gastrointestinal:  Positive for abdominal pain, nausea and vomiting. Negative for diarrhea.   Genitourinary:  Negative for dysuria.   Musculoskeletal:  Negative for back pain.   Neurological:  Negative for syncope.   Psychiatric/Behavioral:  Negative for agitation.      Objective:     Vital Signs (Most Recent):  Temp: 98 °F (36.7 °C) (01/28/24 1358)  Pulse: 81 (01/28/24 1931)  Resp: 16 (01/28/24 1931)  BP: (!) 136/59 (01/28/24 1932)  SpO2: 98 % (01/28/24 1931) Vital Signs (24h Range):  Temp:  [98 °F (36.7 °C)] 98 °F (36.7 °C)  Pulse:  [76-94] 81  Resp:  [11-21] 16  SpO2:  [89 %-100 %] 98 %  BP: (136-216)/(59-93) 136/59     Weight: 67.2 kg (148 lb 2.4 oz)  Body mass index is 32.06 kg/m².     Physical Exam  Constitutional:       General: She is not in acute distress.     Appearance: She is ill-appearing.   HENT:      Head: Normocephalic.      Nose: Nose normal.      Mouth/Throat:      Mouth: Mucous membranes are moist.   Eyes:      Extraocular Movements: Extraocular  movements intact.   Cardiovascular:      Rate and Rhythm: Normal rate.   Pulmonary:      Effort: No respiratory distress.      Breath sounds: No wheezing or rales.   Abdominal:      Palpations: Abdomen is soft.      Tenderness: There is abdominal tenderness.      Comments: Diffuse central tenderness.  Colostomy +   Musculoskeletal:         General: No swelling.      Cervical back: Normal range of motion.   Skin:     General: Skin is warm.      Capillary Refill: Capillary refill takes less than 2 seconds.   Neurological:      Mental Status: She is alert and oriented to person, place, and time.   Psychiatric:         Behavior: Behavior normal.                Significant Labs: All pertinent labs within the past 24 hours have been reviewed.  CBC:   Recent Labs   Lab 01/28/24  1522   WBC 9.58   HGB 11.5*   HCT 33.6*        CMP:   Recent Labs   Lab 01/28/24  1439      K 4.6   CL 93*   CO2 29   *   BUN 16   CREATININE 1.0   CALCIUM 9.7   PROT 7.3   ALBUMIN 3.8   BILITOT 0.3   ALKPHOS 98   AST 23   ALT 16   ANIONGAP 15     Lactic Acid:   Recent Labs   Lab 01/28/24  1721   LACTATE 1.8     Lipase:   Recent Labs   Lab 01/28/24  1439   LIPASE 9     Urine Studies:   Recent Labs   Lab 01/28/24  1808   COLORU Yellow   APPEARANCEUA Hazy*   PHUR 7.0   SPECGRAV 1.015   PROTEINUA 1+*   GLUCUA Negative   KETONESU Negative   BILIRUBINUA Negative   OCCULTUA Trace*   NITRITE Positive*   UROBILINOGEN Negative   LEUKOCYTESUR 3+*   RBCUA 8*   WBCUA >100*   BACTERIA Moderate*   SQUAMEPITHEL 3   HYALINECASTS 0       Significant Imaging: I have reviewed all pertinent imaging results/findings within the past 24 hours.

## 2024-01-29 NOTE — ASSESSMENT & PLAN NOTE
Patient with Persistent (7 days or more) atrial fibrillation which is controlled currently with Beta Blocker. Patient is currently in sinus rhythm.[id number] Score: 4. Anticoagulation indicated. Anticoagulation done with home med eliquis . Holding eliquis per suregry request.

## 2024-01-29 NOTE — SUBJECTIVE & OBJECTIVE
No current facility-administered medications on file prior to encounter.     Current Outpatient Medications on File Prior to Encounter   Medication Sig    acetaminophen (TYLENOL) 325 MG tablet Take 2 tablets (650 mg total) by mouth every 4 (four) hours as needed for Pain.    apixaban (ELIQUIS) 2.5 mg Tab Take 1 tablet (2.5 mg total) by mouth 2 (two) times daily.    calcium-vitamin D 600 mg(1,500mg) -400 unit Tab Take 1 tablet by mouth once daily.    docusate sodium (COLACE) 100 MG capsule Take 1 capsule (100 mg total) by mouth 2 (two) times daily.    furosemide (LASIX) 20 MG tablet Take 20 mg by mouth once daily.    levothyroxine (SYNTHROID) 125 MCG tablet TAKE 1 TABLET (125 MCG TOTAL) BY MOUTH ONCE DAILY.    losartan (COZAAR) 25 MG tablet Take 25 mg by mouth once daily.    magnesium oxide (MAG-OX) 400 mg (241.3 mg magnesium) tablet Take 2 tablets (800 mg total) by mouth once daily.    metoprolol tartrate (LOPRESSOR) 25 MG tablet Take 25 mg by mouth 2 (two) times daily.    ondansetron (ZOFRAN) 4 MG tablet Take 4 mg by mouth every 6 (six) hours as needed for Nausea (vomiting).    OXcarbazepine (TRILEPTAL) 150 MG Tab Take 150 mg by mouth nightly.    pantoprazole (PROTONIX) 40 MG tablet Take 40 mg by mouth once daily.    PHENobarbitaL 97.2 MG tablet Take 97.2 mg by mouth every evening.    polyethylene glycol (GLYCOLAX) 17 gram PwPk Take 17 g by mouth once daily.    potassium chloride SA (K-DUR,KLOR-CON M) 10 MEQ tablet Take 1 tablet (10 mEq total) by mouth once daily.    pravastatin (PRAVACHOL) 20 MG tablet Take 20 mg by mouth every evening.    vitamin D (VITAMIN D3) 1000 units Tab Take 1,000 Units by mouth once daily.       Review of patient's allergies indicates:   Allergen Reactions    Adhesive Itching and Blisters    Penicillins Anaphylaxis    Tramadol Hives    Avelox [moxifloxacin] Rash     Facial and arm itching and redness. Pt states throat closes when given.    Amoxil [amoxicillin]     Aspridrox [aspirin,  buffered]     Codeine Other (See Comments)     Throat swelling    Keflex [cephalexin]      Tolerated cefepime and cefazolin    Norvasc [amlodipine]     Red dye Hives    Robitussin [guaifenesin]     Sulfa (sulfonamide antibiotics)     Tylenol [acetaminophen]      Has reaction to Tylenol with red dye and unable to take Extra Strength Tylenol/ CAN ONLY TOLERATE REG STRENGTH TYLENOL    Vicks vaporub [camphor-eucalyptus oil-menthol]        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     Past Surgical History:   Procedure Laterality Date    APPENDECTOMY      BACK SURGERY  1988    vertebral fracture    BACK SURGERY  02/2013    lumbar L2-5    CATARACT EXTRACTION, BILATERAL Bilateral     CHOLECYSTECTOMY      open    ESOPHAGOGASTRODUODENOSCOPY N/A 2/15/2023    Procedure: EGD (ESOPHAGOGASTRODUODENOSCOPY);  Surgeon: Keith Chavarria MD;  Location: [facility] ENDO;  Service: Endoscopy;  Laterality: N/A;    EYE SURGERY Bilateral     cataract removal with lens implant    HYSTERECTOMY      PERCUTANEOUS TRANSLUMINAL ANGIOPLASTY (PTA) OF PERIPHERAL VESSEL N/A 2/3/2020    Procedure: PTA, PERIPHERAL VESSEL;  Surgeon: Timmy Simmons MD;  Location: [facility] CATH LAB/EP;  Service: Cardiology;  Laterality: N/A;    PORTACATH PLACEMENT Right 09/2016    RENAL ARTERY STENT Left 07/19/2017    ROBOT-ASSISTED LAPAROSCOPIC REPAIR OF VENTRAL HERNIA N/A 3/23/2023    Procedure: ROBOTIC REPAIR, HERNIA, VENTRAL;  Surgeon: Chris Johnson MD;  Location: [facility] OR;  Service:  General;  Laterality: N/A;  Robotic Parastomal hernia repair    SIGMOIDECTOMY  10/29/2019    SMALL INTESTINE SURGERY  08/23/2016    THROMBECTOMY N/A 2/3/2020    Procedure: THROMBECTOMY;  Surgeon: Timmy Simmons MD;  Location: [facility] CATH LAB/EP;  Service: Cardiology;  Laterality: N/A;    TONSILLECTOMY      VAGINAL HYSTERECTOMY W/ ANTERIOR AND POSTERIOR VAGINAL REPAIR       Family History       Problem Relation (Age of Onset)    Heart attack Father    Hypertension Father    Pancreatic cancer Mother          Tobacco Use    Smoking status: Never    Smokeless tobacco: Never   Substance and Sexual Activity    Alcohol use: No    Drug use: No    Sexual activity: Not Currently     Partners: Male     Review of Systems   Constitutional:  Negative for chills and fever.   HENT:  Negative for trouble swallowing and voice change.    Eyes:  Negative for visual disturbance.   Respiratory:  Negative for cough and chest tightness.    Cardiovascular:  Negative for chest pain and palpitations.   Gastrointestinal:  Positive for abdominal pain, nausea and vomiting.   Endocrine: Negative.    Genitourinary:  Negative for difficulty urinating and flank pain.   Musculoskeletal: Negative.    Neurological: Negative.    Hematological: Negative.    Psychiatric/Behavioral: Negative.       Objective:     Vital Signs (Most Recent):  Temp: 97.8 °F (36.6 °C) (01/29/24 0724)  Pulse: 79 (01/29/24 0724)  Resp: 20 (01/29/24 0724)  BP: (!) 152/68 (01/29/24 0724)  SpO2: 96 % (01/29/24 0724) Vital Signs (24h Range):  Temp:  [97.8 °F (36.6 °C)-100.5 °F (38.1 °C)] 97.8 °F (36.6 °C)  Pulse:  [76-94] 79  Resp:  [11-21] 20  SpO2:  [89 %-100 %] 96 %  BP: (134-216)/(59-93) 152/68     Weight: 64.5 kg (142 lb 3.2 oz)  Body mass index is 30.77 kg/m².     Physical Exam  Vitals reviewed.   Constitutional:       General: She is not in acute distress.     Appearance: Normal appearance. She is not toxic-appearing.   HENT:      Head: Normocephalic and atraumatic.       Nose:      Comments: NGT to LIWS     Mouth/Throat:      Mouth: Mucous membranes are moist.   Cardiovascular:      Rate and Rhythm: Normal rate.      Pulses: Normal pulses.   Pulmonary:      Effort: Pulmonary effort is normal. No respiratory distress.   Abdominal:      General: Abdomen is flat.      Palpations: Abdomen is soft.      Comments: L colostomy with ostomy appliance overlaying  Tender to palpation in the mid epigastrium, voluntary guarding, no rebound, non peritonitic     Skin:     General: Skin is warm.   Neurological:      General: No focal deficit present.      Mental Status: She is alert and oriented to person, place, and time.   Psychiatric:         Mood and Affect: Mood normal.         Behavior: Behavior normal.            I have reviewed all pertinent lab results within the past 24 hours.  CBC:   Recent Labs   Lab 01/29/24 0425   WBC 10.59   RBC 3.29*   HGB 10.8*   HCT 32.6*      MCV 99*   MCH 32.8*   MCHC 33.1     CMP:   Recent Labs   Lab 01/29/24 0425   *   CALCIUM 9.3   ALBUMIN 3.6   PROT 6.8      K 3.7   CO2 28   CL 99   BUN 14   CREATININE 0.9   ALKPHOS 83   ALT 15   AST 14   BILITOT 0.3       Significant Diagnostics:  I have reviewed all pertinent imaging results/findings within the past 24 hours.      CT Abdomen Pelvis With IV Contrast NO Oral Contrast  1/28/2024    1. Distended loops of small bowel with a transition point in the mid abdomen, at an identical location to prior CT dated 12/15/2023.  Findings are concerning for partial small bowel obstruction. 2. Postoperative changes of partial small and large bowel resection with a left lower quadrant colostomy. 3. Small hiatal hernia with mild wall thickening of the distal esophageal wall and minimal paraesophageal fluid.  Further evaluation with EGD as clinically indicated.

## 2024-01-29 NOTE — ASSESSMENT & PLAN NOTE
88 F with a history of a fib (on eliquis), CAD, HTN, seizure disorder, LLE DVT, May-Thurner Syndrome, osteoporosis, and remote history of colon resection w/ end colostomy c/b parastomal hernia requiring repair (most recently March 2023) who presents with abdominal pain associated with nausea and vomiting. CT A/P with dilated loops of small bowel w/ transition point in the mid abdomen. This appears similar to scan on 12/15/23. Will treat conservatively with NGT decompression and bowel rest. Gastrograffin challenge today.     -- gastrograffin challenge today   -- NPO, mIVF   -- NGT to LIWS   -- MM pain and nausea control   -- hold eliquis   -- remainder of care per primary team

## 2024-01-29 NOTE — SUBJECTIVE & OBJECTIVE
Interval History: pt is seen today. She has no complaint, gastrografin challenge today. Surgery on board, elevated BP but otherwise stable vitals. Continue NPO. No acute event    Review of Systems    Constitutional:  Negative for fever.   HENT:  Negative for congestion.    Eyes:  Negative for visual disturbance.   Respiratory:  Negative for cough and shortness of breath.    Cardiovascular:  Negative for chest pain and leg swelling.   Gastrointestinal:  Positive for abdominal pain, nausea and vomiting. Negative for diarrhea.   Genitourinary:  Negative for dysuria.   Musculoskeletal:  Negative for back pain.   Neurological:  Negative for syncope.   Psychiatric/Behavioral:  Negative for agitation.    Objective:     Vital Signs (Most Recent):  Temp: 97.9 °F (36.6 °C) (01/29/24 1131)  Pulse: 78 (01/29/24 1131)  Resp: 18 (01/29/24 1131)  BP: (!) 167/72 (01/29/24 1131)  SpO2: 98 % (01/29/24 1131) Vital Signs (24h Range):  Temp:  [97.8 °F (36.6 °C)-100.5 °F (38.1 °C)] 97.9 °F (36.6 °C)  Pulse:  [76-89] 78  Resp:  [11-20] 18  SpO2:  [96 %-100 %] 98 %  BP: (134-216)/(59-86) 167/72     Weight: 64.5 kg (142 lb 3.2 oz)  Body mass index is 30.77 kg/m².    Intake/Output Summary (Last 24 hours) at 1/29/2024 1559  Last data filed at 1/29/2024 1030  Gross per 24 hour   Intake 206.46 ml   Output 550 ml   Net -343.54 ml         Physical Exam    Constitutional:       General: She is not in acute distress.     Appearance: She is ill-appearing.   HENT:      Head: Normocephalic.      Nose: Nose normal.      Mouth/Throat:      Mouth: Mucous membranes are moist.   Eyes:      Extraocular Movements: Extraocular movements intact.   Cardiovascular:      Rate and Rhythm: Normal rate.   Pulmonary:      Effort: No respiratory distress.      Breath sounds: No wheezing or rales.   Abdominal:      Palpations: Abdomen is soft.      Tenderness: There is abdominal tenderness.      Comments: Diffuse central tenderness.  Colostomy +   Musculoskeletal:          General: No swelling.      Cervical back: Normal range of motion.   Skin:     General: Skin is warm.      Capillary Refill: Capillary refill takes less than 2 seconds.   Neurological:      Mental Status: She is alert and oriented to person, place, and time.   Psychiatric:         Behavior: Behavior normal.     Significant Labs: All pertinent labs within the past 24 hours have been reviewed.    Significant Imaging: I have reviewed all pertinent imaging results/findings within the past 24 hours.

## 2024-01-29 NOTE — ASSESSMENT & PLAN NOTE
Patient with Persistent (7 days or more) atrial fibrillation which is controlled currently with Beta Blocker. Patient is currently in sinus rhythm.[id number] Score: 4. Anticoagulation indicated. Anticoagulation done with home med eliquis .

## 2024-01-29 NOTE — ASSESSMENT & PLAN NOTE
Patient received IV fluid in ER  NPO  NG tube placement  prn zofran for nausea control   Consult to general surgery

## 2024-01-29 NOTE — ED NOTES
NG tube connected to LIS. Purewick connected to continuous suction.   Patient and family updated on plan of care. Family went home for the night.

## 2024-01-29 NOTE — PLAN OF CARE
The sw faxed the pt's updates to [facility] via [employer] b/c she's a current [facility] resident at the [facility].        01/29/24 3020   Post-Acute Status   Post-Acute Authorization Placement   Post-Acute Placement Status Pending medical clearance/testing   Discharge Delays None known at this time   Discharge Plan   Discharge Plan A Return to nursing home   Discharge Plan B Other  (TBD)

## 2024-01-29 NOTE — PROGRESS NOTES
[facility] Medicine  Progress Note    Patient Name: Annette Garcia  MRN: [id number]  Patient Class: IP- Inpatient   Admission Date: 1/28/2024  Length of Stay: 1 days  Attending Physician: Ирина Walker MD  Primary Care Provider: [facility]        Subjective:     Principal Problem:Small bowel obstruction        HPI:  88 years old female with past medical history significant for HTN, seizure disorder, afib on eliquis, LLE DVT, May-Thurner Syndrome, SBO s/p colostomy, and osteoporosis, who came to ER because of vomiting and abdominal pain since yesterday.  Patient reported she had multiple episodes of nonbloody vomiting since yesterday.  Patient reported she has been having abdominal pain since yesterday which is localized to central abdomen, 6-7/10 in severity and nonradiating.  Patient reported she has history of small-bowel obstruction.  Patient also reported feeling nauseous.  Patient denied any other symptoms including chest pain shortness of breath diarrhea fever chills cough or dizziness. CT A&P showed ''distended loops of small bowel with a transition point in the mid abdomen, at an identical location to prior CT dated 12/15/2023.  Findings are concerning for partial small bowel obstruction. Postoperative changes of partial small and large bowel resection with a left lower quadrant colostomy. Small hiatal hernia with mild wall thickening of the distal esophageal wall and minimal paraesophageal fluid''. Chest xray negative for any acute abnormality. US showed UTI. Patient received IV fluid and general surgery has been consulted in ER.  In the ER, blood pressure was 200/93 mm Hg, patient received IV labetalol 10 mg, most recent blood pressure is 147/74 mm Hg.  I also spoke to patient's daughter at bedside.      Overview/Hospital Course:  1/29/274: surgery on board, Gastrografin challenge today    Interval History: pt is seen today. She has no complaint, gastrografin  challenge today. Surgery on board, elevated BP but otherwise stable vitals. Continue NPO. No acute event    Review of Systems    Constitutional:  Negative for fever.   HENT:  Negative for congestion.    Eyes:  Negative for visual disturbance.   Respiratory:  Negative for cough and shortness of breath.    Cardiovascular:  Negative for chest pain and leg swelling.   Gastrointestinal:  Positive for abdominal pain, nausea and vomiting. Negative for diarrhea.   Genitourinary:  Negative for dysuria.   Musculoskeletal:  Negative for back pain.   Neurological:  Negative for syncope.   Psychiatric/Behavioral:  Negative for agitation.    Objective:     Vital Signs (Most Recent):  Temp: 97.9 °F (36.6 °C) (01/29/24 1131)  Pulse: 78 (01/29/24 1131)  Resp: 18 (01/29/24 1131)  BP: (!) 167/72 (01/29/24 1131)  SpO2: 98 % (01/29/24 1131) Vital Signs (24h Range):  Temp:  [97.8 °F (36.6 °C)-100.5 °F (38.1 °C)] 97.9 °F (36.6 °C)  Pulse:  [76-89] 78  Resp:  [11-20] 18  SpO2:  [96 %-100 %] 98 %  BP: (134-216)/(59-86) 167/72     Weight: 64.5 kg (142 lb 3.2 oz)  Body mass index is 30.77 kg/m².    Intake/Output Summary (Last 24 hours) at 1/29/2024 1559  Last data filed at 1/29/2024 1030  Gross per 24 hour   Intake 206.46 ml   Output 550 ml   Net -343.54 ml         Physical Exam    Constitutional:       General: She is not in acute distress.     Appearance: She is ill-appearing.   HENT:      Head: Normocephalic.      Nose: Nose normal.      Mouth/Throat:      Mouth: Mucous membranes are moist.   Eyes:      Extraocular Movements: Extraocular movements intact.   Cardiovascular:      Rate and Rhythm: Normal rate.   Pulmonary:      Effort: No respiratory distress.      Breath sounds: No wheezing or rales.   Abdominal:      Palpations: Abdomen is soft.      Tenderness: There is abdominal tenderness.      Comments: Diffuse central tenderness.  Colostomy +   Musculoskeletal:         General: No swelling.      Cervical back: Normal range of motion.    Skin:     General: Skin is warm.      Capillary Refill: Capillary refill takes less than 2 seconds.   Neurological:      Mental Status: She is alert and oriented to person, place, and time.   Psychiatric:         Behavior: Behavior normal.     Significant Labs: All pertinent labs within the past 24 hours have been reviewed.    Significant Imaging: I have reviewed all pertinent imaging results/findings within the past 24 hours.    Assessment/Plan:      * Small bowel obstruction    NPO  NG tube placed and intact  prn zofran for nausea control   General surgery on board.      Hypertensive urgency  Patient has a current diagnosis of hypertensive urgency (without evidence of end organ damage) which is controlled.  Latest blood pressure and vitals reviewed-   Temp:  [97.8 °F (36.6 °C)-100.5 °F (38.1 °C)]   Pulse:  [76-89]   Resp:  [11-20]   BP: (134-196)/(59-79)   SpO2:  [96 %-100 %] .   Patient currently off IV antihypertensives.   Home meds for hypertension were reviewed and noted below.   Hypertension Medications               furosemide (LASIX) 20 MG tablet Take 20 mg by mouth once daily.    losartan (COZAAR) 25 MG tablet Take 25 mg by mouth once daily.    metoprolol tartrate (LOPRESSOR) 25 MG tablet Take 25 mg by mouth 2 (two) times daily.            Medication adjustment for hospital antihypertensives is as follows- IV labetalol     Will aim for controlled BP reduction by medications noted above. Monitor and mitigate end organ damage as indicated.    UTI (urinary tract infection)  Urine culture pending   IV cefepime      A-fib  Patient with Persistent (7 days or more) atrial fibrillation which is controlled currently with Beta Blocker. Patient is currently in sinus rhythm.[id number] Score: 4. Anticoagulation indicated. Anticoagulation done with home med eliquis . Holding eliquis per suregry request.    Hypothyroidism  Continue home med levothyroxine        VTE Risk Mitigation (From admission, onward)            Ordered     IP VTE HIGH RISK PATIENT  Once         01/1937     Place sequential compression device  Until discontinued         01/1937                    Discharge Planning   JESSICA:      Code Status: Full Code   Is the patient medically ready for discharge?:     Reason for patient still in hospital (select all that apply): Treatment  Discharge Plan A: Return to nursing home   Discharge Delays: None known at this time              Ирина Walker MD  Department of Hospital Medicine   [facility]

## 2024-01-29 NOTE — ASSESSMENT & PLAN NOTE
Patient has a current diagnosis of hypertensive urgency (without evidence of end organ damage) which is controlled.  Latest blood pressure and vitals reviewed-   Temp:  [97.8 °F (36.6 °C)-100.5 °F (38.1 °C)]   Pulse:  [76-89]   Resp:  [11-20]   BP: (134-196)/(59-79)   SpO2:  [96 %-100 %] .   Patient currently off IV antihypertensives.   Home meds for hypertension were reviewed and noted below.   Hypertension Medications               furosemide (LASIX) 20 MG tablet Take 20 mg by mouth once daily.    losartan (COZAAR) 25 MG tablet Take 25 mg by mouth once daily.    metoprolol tartrate (LOPRESSOR) 25 MG tablet Take 25 mg by mouth 2 (two) times daily.            Medication adjustment for hospital antihypertensives is as follows- IV labetalol     Will aim for controlled BP reduction by medications noted above. Monitor and mitigate end organ damage as indicated.

## 2024-01-29 NOTE — PLAN OF CARE
The sw met with the pt in her room and spoke to her dtr Beatris Richey [phone] via phone to complete the assessment. The pt has been a long-term resident at [facility] for the past 4 years and will return at d/c. The pt has a very supportive family. The staff at [facility] assist the pt with her ADL's as needed. The sw left the pt a d/c brochure at bedside for her and her family. The sw encouraged them to call if they have any further questions or concerns. The sw completed the white board in the pt's room with her name and contact info. The sw will continue to follow the pt throughout her transitions of care and will assist with any d/c needs.     [place] - Med Surg  Initial Discharge Assessment       Primary Care Provider: [facility]    Admission Diagnosis: Shortness of breath [R06.02]  HTN (hypertension) [I10]  Chest pain [R07.9]  Nausea and vomiting, unspecified vomiting type [R11.2]    Admission Date: 1/28/2024  Expected Discharge Date:     Transition of Care Barriers: (P) None    Payor: MEDICARE / Plan: MEDICARE PART A & B / Product Type: Government /     Extended Emergency Contact Information  Primary Emergency Contact: Beatris Richey   [place]  Work Phone: [phone]  Mobile Phone: [phone]  Relation: Daughter  Secondary Emergency Contact: Lorenza Richey   [facility]  Home Phone: [phone]  Mobile Phone: [phone]  Relation: Daughter    Discharge Plan A: (P) Return to nursing home  Discharge Plan B: (P) Other (TBD)      [facility] - [place] - [id number] [address]  [address] [address]  [facility] [address]  Phone: [phone] Fax: [phone]    [facility] - [place] - [address]  [address] [address]  Phone: [phone] Fax: [phone]      Initial Assessment (most recent)       Adult Discharge Assessment - 01/29/24 0859          Discharge Assessment    Assessment Type Discharge Planning Assessment (P)       Confirmed/corrected address, phone number and insurance Yes (P)      Confirmed Demographics Correct on Facesheet (P)      Source of Information patient;family (P)      If unable to respond/provide information was family/caregiver contacted? Yes (P)      Contact Name/Number Beatris Richey(dtr)[phone] (P)      Communicated JESSICA with patient/caregiver Date not available/Unable to determine (P)      Reason For Admission Small bowel obstruction (P)      People in Home facility resident (P)      Facility Arrived From: [facility] (P)      Do you expect to return to your current living situation? Yes (P)      Do you have help at home or someone to help you manage your care at home? Yes (P)      Who are your caregiver(s) and their phone number(s)? the staff at [facility] (P)      Prior to hospitilization cognitive status: Alert/Oriented (P)      Current cognitive status: Alert/Oriented (P)      Walking or Climbing Stairs Difficulty yes (P)      Walking or Climbing Stairs ambulation difficulty, dependent;stair climbing difficulty, dependent;transferring difficulty, dependent (P)      Dressing/Bathing Difficulty yes (P)      Dressing/Bathing bathing difficulty, assistance 1 person (P)      Home Accessibility wheelchair accessible (P)      Home Layout Able to live on 1st floor (P)      Equipment Currently Used at Home other (see comments) (P)    [facility] dme    Patient currently being followed by outpatient case management? No (P)      Do you take prescription medications? Yes (P)      Do you have prescription coverage? Yes (P)      Coverage Medicare [place]/B, [place] of [place]  Medicare Supplement (P)      Do you have any problems affording any of your prescribed medications? No (P)      Is the patient taking medications as prescribed? yes (P)      Who is going to help you get home at discharge? Case Management will arange the pt's transport to return to the nh (P)      How do you get to doctors appointments? other (see  comments) (P)    [facility] arranges    Are you on dialysis? No (P)      Do you take coumadin? No (P)      Discharge Plan A Return to nursing home (P)      Discharge Plan B Other (P)    TBD    DME Needed Upon Discharge  none (P)      Discharge Plan discussed with: Patient;Adult children (P)      Transition of Care Barriers None (P)         Physical Activity    On average, how many days per week do you engage in moderate to strenuous exercise (like a brisk walk)? 0 days (P)      On average, how many minutes do you engage in exercise at this level? 0 min (P)         Financial Resource Strain    How hard is it for you to pay for the very basics like food, housing, medical care, and heating? Not hard at all (P)         Housing Stability    In the last 12 months, was there a time when you were not able to pay the mortgage or rent on time? No (P)      In the last 12 months, how many places have you lived? 1 (P)      In the last 12 months, was there a time when you did not have a steady place to sleep or slept in a shelter (including now)? No (P)         Transportation Needs    In the past 12 months, has lack of transportation kept you from medical appointments or from getting medications? No (P)      In the past 12 months, has lack of transportation kept you from meetings, work, or from getting things needed for daily living? No (P)         Food Insecurity    Within the past 12 months, you worried that your food would run out before you got the money to buy more. Never true (P)      Within the past 12 months, the food you bought just didn't last and you didn't have money to get more. Never true (P)         Stress    Do you feel stress - tense, restless, nervous, or anxious, or unable to sleep at night because your mind is troubled all the time - these days? Not at all (P)         Social Connections    In a typical week, how many times do you talk on the phone with family, friends, or neighbors? More than three times a week  (P)      How often do you get together with friends or relatives? More than three times a week (P)      How often do you attend Rastafari or Baptism services? Never (P)      Do you belong to any clubs or organizations such as Rastafari groups, unions, fraternal or athletic groups, or school groups? No (P)      How often do you attend meetings of the clubs or organizations you belong to? Never (P)      Are you , , , , never , or living with a partner?  (P)         Alcohol Use    Q1: How often do you have a drink containing alcohol? Never (P)      Q2: How many drinks containing alcohol do you have on a typical day when you are drinking? Patient does not drink (P)      Q3: How often do you have six or more drinks on one occasion? Never (P)         OTHER    Name(s) of [facility] retirement nh resident (P)

## 2024-01-29 NOTE — PLAN OF CARE
Pt arrived to the floor safely from ER via stretcher. Report and orders received, pt oriented to room, unit and VN. NG tube connected to LCWS, putting out small amount of fluid. Colostomy empty, skin rush noted around appliance area on lower abdomen. No complains of pain, SOB, distress. N?V relieved with NG tube.

## 2024-01-29 NOTE — CONSULTS
[facility] Surg  General Surgery  Consult Note    Patient Name: Annette Garcia  MRN: [id number]  Code Status: Full Code  Admission Date: 1/28/2024  Hospital Length of Stay: 1 days  Attending Physician: Ирина Walker MD  Primary Care Provider: [facility]    Patient information was obtained from patient.     Inpatient consult to General surgery  Consult performed by: Swapna Chan MD  Consult ordered by: Ezekiel Short MD        Subjective:     Principal Problem: Small bowel obstruction    History of Present Illness: Annette Garcia is a 88 y.o. female with a history of a fib (on eliquis), CAD, HTN, seizure disorder, LLE DVT, May-Thurner Syndrome, osteoporosis, and remote history of colon resection w/ end colostomy c/b parastomal hernia requiring repair (most recently March 2023) who presents with abdominal pain associated with nausea and vomiting. States that she has also noticed decreased colostomy output over the last three days. In the Ed, the patient is afebrile and hypertensive but otherwise hemodynamically stable. An NGT was placed with minimal return. CT A/P w/ distended loops of small bowel and a transition point in the mid abdomen (also viewed on CT from 12/15/23) and mild wall thickening of the distal esophagus w/ small hiatal hernia. Patient was admitted to hospital medicine. General surgery consulted for small bowel obstruction.         No current facility-administered medications on file prior to encounter.     Current Outpatient Medications on File Prior to Encounter   Medication Sig    acetaminophen (TYLENOL) 325 MG tablet Take 2 tablets (650 mg total) by mouth every 4 (four) hours as needed for Pain.    apixaban (ELIQUIS) 2.5 mg Tab Take 1 tablet (2.5 mg total) by mouth 2 (two) times daily.    calcium-vitamin D 600 mg(1,500mg) -400 unit Tab Take 1 tablet by mouth once daily.    docusate sodium (COLACE) 100 MG capsule Take 1 capsule (100 mg total) by mouth 2 (two)  times daily.    furosemide (LASIX) 20 MG tablet Take 20 mg by mouth once daily.    levothyroxine (SYNTHROID) 125 MCG tablet TAKE 1 TABLET (125 MCG TOTAL) BY MOUTH ONCE DAILY.    losartan (COZAAR) 25 MG tablet Take 25 mg by mouth once daily.    magnesium oxide (MAG-OX) 400 mg (241.3 mg magnesium) tablet Take 2 tablets (800 mg total) by mouth once daily.    metoprolol tartrate (LOPRESSOR) 25 MG tablet Take 25 mg by mouth 2 (two) times daily.    ondansetron (ZOFRAN) 4 MG tablet Take 4 mg by mouth every 6 (six) hours as needed for Nausea (vomiting).    OXcarbazepine (TRILEPTAL) 150 MG Tab Take 150 mg by mouth nightly.    pantoprazole (PROTONIX) 40 MG tablet Take 40 mg by mouth once daily.    PHENobarbitaL 97.2 MG tablet Take 97.2 mg by mouth every evening.    polyethylene glycol (GLYCOLAX) 17 gram PwPk Take 17 g by mouth once daily.    potassium chloride SA (K-DUR,KLOR-CON M) 10 MEQ tablet Take 1 tablet (10 mEq total) by mouth once daily.    pravastatin (PRAVACHOL) 20 MG tablet Take 20 mg by mouth every evening.    vitamin D (VITAMIN D3) 1000 units Tab Take 1,000 Units by mouth once daily.       Review of patient's allergies indicates:   Allergen Reactions    Adhesive Itching and Blisters    Penicillins Anaphylaxis    Tramadol Hives    Avelox [moxifloxacin] Rash     Facial and arm itching and redness. Pt states throat closes when given.    Amoxil [amoxicillin]     Aspridrox [aspirin, buffered]     Codeine Other (See Comments)     Throat swelling    Keflex [cephalexin]      Tolerated cefepime and cefazolin    Norvasc [amlodipine]     Red dye Hives    Robitussin [guaifenesin]     Sulfa (sulfonamide antibiotics)     Tylenol [acetaminophen]      Has reaction to Tylenol with red dye and unable to take Extra Strength Tylenol/ CAN ONLY TOLERATE REG STRENGTH TYLENOL    Vicks vaporub [camphor-eucalyptus oil-menthol]        Past Medical History:   Diagnosis Date    Allergy     Arthritis     arms and legs-osteoarthritis    Cancer      colon    Coronary artery disease 08/14/2020    Digestive disorder     Disorder of kidney and ureter     E coli bacteremia 10/29/2019    Encounter for blood transfusion     Hypertension     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     Past Surgical History:   Procedure Laterality Date    APPENDECTOMY      BACK SURGERY  1988    vertebral fracture    BACK SURGERY  02/2013    lumbar L2-5    CATARACT EXTRACTION, BILATERAL Bilateral     CHOLECYSTECTOMY      open    ESOPHAGOGASTRODUODENOSCOPY N/A 2/15/2023    Procedure: EGD (ESOPHAGOGASTRODUODENOSCOPY);  Surgeon: Keith Chavarria MD;  Location: [facility] ENDO;  Service: Endoscopy;  Laterality: N/A;    EYE SURGERY Bilateral     cataract removal with lens implant    HYSTERECTOMY      PERCUTANEOUS TRANSLUMINAL ANGIOPLASTY (PTA) OF PERIPHERAL VESSEL N/A 2/3/2020    Procedure: PTA, PERIPHERAL VESSEL;  Surgeon: Timmy Simmons MD;  Location: [facility] CATH LAB/EP;  Service: Cardiology;  Laterality: N/A;    PORTACATH PLACEMENT Right 09/2016    RENAL ARTERY STENT Left 07/19/2017    ROBOT-ASSISTED LAPAROSCOPIC REPAIR OF VENTRAL HERNIA N/A 3/23/2023    Procedure: ROBOTIC REPAIR, HERNIA, VENTRAL;  Surgeon: Chris Johnson MD;  Location: [facility] OR;  Service: General;  Laterality: N/A;  Robotic Parastomal hernia repair    SIGMOIDECTOMY  10/29/2019    SMALL INTESTINE SURGERY  08/23/2016    THROMBECTOMY N/A 2/3/2020    Procedure: THROMBECTOMY;  Surgeon: Timmy Simmons MD;  Location: [facility] CATH LAB/EP;  Service: Cardiology;  Laterality: N/A;    TONSILLECTOMY      VAGINAL HYSTERECTOMY W/ ANTERIOR AND POSTERIOR VAGINAL REPAIR       Family History       Problem Relation (Age of Onset)    Heart attack Father    Hypertension Father    Pancreatic cancer Mother          Tobacco Use    Smoking status:  Never    Smokeless tobacco: Never   Substance and Sexual Activity    Alcohol use: No    Drug use: No    Sexual activity: Not Currently     Partners: Male     Review of Systems   Constitutional:  Negative for chills and fever.   HENT:  Negative for trouble swallowing and voice change.    Eyes:  Negative for visual disturbance.   Respiratory:  Negative for cough and chest tightness.    Cardiovascular:  Negative for chest pain and palpitations.   Gastrointestinal:  Positive for abdominal pain, nausea and vomiting.   Endocrine: Negative.    Genitourinary:  Negative for difficulty urinating and flank pain.   Musculoskeletal: Negative.    Neurological: Negative.    Hematological: Negative.    Psychiatric/Behavioral: Negative.       Objective:     Vital Signs (Most Recent):  Temp: 97.8 °F (36.6 °C) (01/29/24 0724)  Pulse: 79 (01/29/24 0724)  Resp: 20 (01/29/24 0724)  BP: (!) 152/68 (01/29/24 0724)  SpO2: 96 % (01/29/24 0724) Vital Signs (24h Range):  Temp:  [97.8 °F (36.6 °C)-100.5 °F (38.1 °C)] 97.8 °F (36.6 °C)  Pulse:  [76-94] 79  Resp:  [11-21] 20  SpO2:  [89 %-100 %] 96 %  BP: (134-216)/(59-93) 152/68     Weight: 64.5 kg (142 lb 3.2 oz)  Body mass index is 30.77 kg/m².     Physical Exam  Vitals reviewed.   Constitutional:       General: She is not in acute distress.     Appearance: Normal appearance. She is not toxic-appearing.   HENT:      Head: Normocephalic and atraumatic.      Nose:      Comments: NGT to LIWS     Mouth/Throat:      Mouth: Mucous membranes are moist.   Cardiovascular:      Rate and Rhythm: Normal rate.      Pulses: Normal pulses.   Pulmonary:      Effort: Pulmonary effort is normal. No respiratory distress.   Abdominal:      General: Abdomen is flat.      Palpations: Abdomen is soft.      Comments: L colostomy with ostomy appliance overlaying  Tender to palpation in the mid epigastrium, voluntary guarding, no rebound, non peritonitic     Skin:     General: Skin is warm.   Neurological:       General: No focal deficit present.      Mental Status: She is alert and oriented to person, place, and time.   Psychiatric:         Mood and Affect: Mood normal.         Behavior: Behavior normal.            I have reviewed all pertinent lab results within the past 24 hours.  CBC:   Recent Labs   Lab 01/29/24  0425   WBC 10.59   RBC 3.29*   HGB 10.8*   HCT 32.6*      MCV 99*   MCH 32.8*   MCHC 33.1     CMP:   Recent Labs   Lab 01/29/24  0425   *   CALCIUM 9.3   ALBUMIN 3.6   PROT 6.8      K 3.7   CO2 28   CL 99   BUN 14   CREATININE 0.9   ALKPHOS 83   ALT 15   AST 14   BILITOT 0.3       Significant Diagnostics:  I have reviewed all pertinent imaging results/findings within the past 24 hours.      CT Abdomen Pelvis With IV Contrast NO Oral Contrast  1/28/2024    1. Distended loops of small bowel with a transition point in the mid abdomen, at an identical location to prior CT dated 12/15/2023.  Findings are concerning for partial small bowel obstruction. 2. Postoperative changes of partial small and large bowel resection with a left lower quadrant colostomy. 3. Small hiatal hernia with mild wall thickening of the distal esophageal wall and minimal paraesophageal fluid.  Further evaluation with EGD as clinically indicated.     Assessment/Plan:     S/P partial resection of colon  88 F with a history of a fib (on eliquis), CAD, HTN, seizure disorder, LLE DVT, May-Thurner Syndrome, osteoporosis, and remote history of colon resection w/ end colostomy c/b parastomal hernia requiring repair (most recently March 2023) who presents with abdominal pain associated with nausea and vomiting. CT A/P with dilated loops of small bowel w/ transition point in the mid abdomen. This appears similar to scan on 12/15/23. Will treat conservatively with NGT decompression and bowel rest. Gastrograffin challenge today.     -- gastrograffin challenge today   -- NPO, mIVF   -- NGT to LIWS   -- MM pain and nausea control   --  hold eliquis   -- remainder of care per primary team         Swapna Chan MD  General Surgery  [facility] Surg

## 2024-01-29 NOTE — ASSESSMENT & PLAN NOTE
Patient has a current diagnosis of hypertensive urgency (without evidence of end organ damage) which is controlled.  Latest blood pressure and vitals reviewed-   Temp:  [98 °F (36.7 °C)]   Pulse:  [76-94]   Resp:  [11-21]   BP: (136-216)/(59-93)   SpO2:  [89 %-100 %] .   Patient currently off IV antihypertensives.   Home meds for hypertension were reviewed and noted below.   Hypertension Medications               furosemide (LASIX) 20 MG tablet Take 20 mg by mouth once daily.    losartan (COZAAR) 25 MG tablet Take 25 mg by mouth once daily.    metoprolol tartrate (LOPRESSOR) 25 MG tablet Take 25 mg by mouth 2 (two) times daily.            Medication adjustment for hospital antihypertensives is as follows- IV labetalol     Will aim for controlled BP reduction by medications noted above. Monitor and mitigate end organ damage as indicated.

## 2024-01-30 LAB
POCT GLUCOSE: 101 MG/DL (ref 70–110)
POCT GLUCOSE: 93 MG/DL (ref 70–110)
POCT GLUCOSE: 96 MG/DL (ref 70–110)

## 2024-01-30 PROCEDURE — 11000001 HC ACUTE MED/SURG PRIVATE ROOM

## 2024-01-30 PROCEDURE — 99232 SBSQ HOSP IP/OBS MODERATE 35: CPT | Mod: ,,, | Performed by: STUDENT IN AN ORGANIZED HEALTH CARE EDUCATION/TRAINING PROGRAM

## 2024-01-30 PROCEDURE — 25000003 PHARM REV CODE 250: Performed by: INTERNAL MEDICINE

## 2024-01-30 PROCEDURE — 51798 US URINE CAPACITY MEASURE: CPT

## 2024-01-30 PROCEDURE — 94761 N-INVAS EAR/PLS OXIMETRY MLT: CPT

## 2024-01-30 PROCEDURE — 25000003 PHARM REV CODE 250: Performed by: HOSPITALIST

## 2024-01-30 PROCEDURE — 63600175 PHARM REV CODE 636 W HCPCS: Performed by: INTERNAL MEDICINE

## 2024-01-30 PROCEDURE — 99900035 HC TECH TIME PER 15 MIN (STAT)

## 2024-01-30 PROCEDURE — 27000221 HC OXYGEN, UP TO 24 HOURS

## 2024-01-30 RX ORDER — PHENOBARBITAL 32.4 MG/1
97.2 TABLET ORAL NIGHTLY
Status: DISCONTINUED | OUTPATIENT
Start: 2024-01-30 | End: 2024-02-02 | Stop reason: HOSPADM

## 2024-01-30 RX ORDER — OXCARBAZEPINE 150 MG/1
150 TABLET, FILM COATED ORAL NIGHTLY
Status: DISCONTINUED | OUTPATIENT
Start: 2024-01-30 | End: 2024-02-02 | Stop reason: HOSPADM

## 2024-01-30 RX ORDER — LOSARTAN POTASSIUM 25 MG/1
25 TABLET ORAL DAILY
Status: DISCONTINUED | OUTPATIENT
Start: 2024-01-30 | End: 2024-02-02 | Stop reason: HOSPADM

## 2024-01-30 RX ADMIN — METOPROLOL TARTRATE 25 MG: 25 TABLET, FILM COATED ORAL at 08:01

## 2024-01-30 RX ADMIN — LEVOTHYROXINE SODIUM 125 MCG: 25 TABLET ORAL at 09:01

## 2024-01-30 RX ADMIN — PANTOPRAZOLE SODIUM 40 MG: 40 TABLET, DELAYED RELEASE ORAL at 09:01

## 2024-01-30 RX ADMIN — PRAVASTATIN SODIUM 20 MG: 10 TABLET ORAL at 08:01

## 2024-01-30 RX ADMIN — CEFEPIME 1 G: 1 INJECTION, POWDER, FOR SOLUTION INTRAMUSCULAR; INTRAVENOUS at 08:01

## 2024-01-30 RX ADMIN — OXCARBAZEPINE 150 MG: 150 TABLET, FILM COATED ORAL at 08:01

## 2024-01-30 RX ADMIN — PHENOBARBITAL 97.2 MG: 32.4 TABLET ORAL at 08:01

## 2024-01-30 RX ADMIN — METOPROLOL TARTRATE 25 MG: 25 TABLET, FILM COATED ORAL at 09:01

## 2024-01-30 RX ADMIN — CEFEPIME 1 G: 1 INJECTION, POWDER, FOR SOLUTION INTRAMUSCULAR; INTRAVENOUS at 09:01

## 2024-01-30 RX ADMIN — LOSARTAN POTASSIUM 25 MG: 25 TABLET, FILM COATED ORAL at 09:01

## 2024-01-30 NOTE — ASSESSMENT & PLAN NOTE
Patient has a current diagnosis of hypertensive urgency (without evidence of end organ damage) which is controlled.  Latest blood pressure and vitals reviewed-   Temp:  [98 °F (36.7 °C)-99.6 °F (37.6 °C)]   Pulse:  [62-83]   Resp:  [16-20]   BP: (119-175)/(56-74)   SpO2:  [96 %-100 %] .   Patient currently off IV antihypertensives.   Home meds for hypertension were reviewed and noted below.   Hypertension Medications               furosemide (LASIX) 20 MG tablet Take 20 mg by mouth once daily.    losartan (COZAAR) 25 MG tablet Take 25 mg by mouth once daily.    metoprolol tartrate (LOPRESSOR) 25 MG tablet Take 25 mg by mouth 2 (two) times daily.            Medication adjustment for hospital antihypertensives is as follows- IV labetalol     Will aim for controlled BP reduction by medications noted above. Monitor and mitigate end organ damage as indicated.    Resume home meds

## 2024-01-30 NOTE — ASSESSMENT & PLAN NOTE
88 F with a history of a fib (on eliquis), CAD, HTN, seizure disorder, LLE DVT, May-Thurner Syndrome, osteoporosis, and remote history of colon resection w/ end colostomy c/b parastomal hernia requiring repair (most recently March 2023) who presents with abdominal pain associated with nausea and vomiting. CT A/P with dilated loops of small bowel w/ transition point in the mid abdomen. This appears similar to scan on 12/15/23. Will treat conservatively with NGT decompression and bowel rest. Gastrograffin challenge 1/29 with contrast throughout the colon on 4 and 8 hour films. 950cc of ostomy output yesterday and minimal, thin NGT output. Although her improvement is encouraging, given her multiple recent admissions with the same problem, she would benefit from a diagnostic lap. Will plan to keep her NGT in place until surgery for optimal decompression.     -- to OR tomorrow for diagnostic lap   -- NPO, mIVF  -- NGT to LIWS  -- MM pain and nausea control   -- hold eliquis   -- remainder of care per primary team

## 2024-01-30 NOTE — PLAN OF CARE
Problem: Pain (Intestinal Obstruction)  Goal: Acceptable Pain Control  Outcome: Ongoing, Progressing     Problem: Hypertension Comorbidity  Goal: Blood Pressure in Desired Range  Outcome: Ongoing, Progressing     Problem: UTI (Urinary Tract Infection)  Goal: Improved Infection Symptoms  Outcome: Ongoing, Progressing     Problem: Skin Injury Risk Increased  Goal: Skin Health and Integrity  Outcome: Ongoing, Progressing

## 2024-01-30 NOTE — PROGRESS NOTES
"[place] - Med Surg  Adult Nutrition  Progress Note    SUMMARY     Recommendations  Recommendation:   1. When medically acceptable, initiate clear liquid diet. 2. Monitor need for nutrition support   3. Monitor weight and labs    Goals: Pt will be on a diet by RD follow up    Nutrition Goal Status: new  Communication of RD Recs: other (comment) (POC)    Assessment and Plan  Nutrition Problem  Inadequate energy intake    Related to (etiology):   Small bowel obstruction    Signs and Symptoms (as evidenced by):   NPO status     Interventions:  Collaboration with other providers    Nutrition Diagnosis Status:   New    Reason for Assessment  Reason For Assessment: identified at risk by screening criteria  Diagnosis: gastrointestinal disease (small bowel obstruction)  Relevant Medical History: HTN, hypothyroidism, cancer, disorder of kidney and ureter, CAD, UTI, UTI, CAD, digestive disorder, arthritis  Interdisciplinary Rounds: did not attend  General Information Comments: Pt is currently NPO for a small bowel obstruction. Dagoberto-17/lower abdomen. Noted intermittent nausea. NG tube placed 1/28  for suction. Laparoscopy procedure on 1/28. Noted colostomy 10/29/19. Per chart review pt appears weight stable. Unable to conduct NFPE at time of visit d/t pt with nursing.  Nutrition Discharge Planning: d/c diet to be determined    Nutrition Risk Screen  Nutrition Risk Screen: no indicators present    Nutrition/Diet History  Patient Reported Diet/Restrictions/Preferences: no oral intake  Food Preferences: Unable to assess at this time  Spiritual, Cultural Beliefs, Taoism Practices, Values that Affect Care: no  Factors Affecting Nutritional Intake: altered gastrointestinal function    Anthropometrics  Temp: 98 °F (36.7 °C)  Height Method: Stated  Height: 4' 9" (144.8 cm)  Height (inches): 57 in  Weight Method: Bed Scale  Weight: 64.5 kg (142 lb 3.2 oz)  Weight (lb): 142.2 lb  Ideal Body Weight (IBW), Female: 85 lb  % Ideal Body " Weight, Female (lb): 167.29 %  BMI (Calculated): 30.8  BMI Grade: 30 - 34.9- obesity - grade I  Usual Body Weight (UBW), k.8 kg  % Usual Body Weight: 98.23  % Weight Change From Usual Weight: -1.98 %     Lab/Procedures/Meds  Pertinent Labs Reviewed: reviewed  Pertinent Labs Comments: Glucose 115  Pertinent Medications Reviewed: reviewed  Pertinent Medications Comments: levothyroxine, pantoprazole    Estimated/Assessed Needs  Weight Used For Calorie Calculations: 64.5 kg (142 lb 3.2 oz)  Energy Calorie Requirements (kcal): 1935 kcals (30 kcals/kg)  Energy Need Method: Kcal/kg  Protein Requirements: 64g (1g/kg)  Weight Used For Protein Calculations: 64.5 kg (142 lb 3.2 oz)     Estimated Fluid Requirement Method: RDA Method  RDA Method (mL): 1935     Nutrition Prescription Ordered  Current Diet Order: NPO    Evaluation of Received Nutrient/Fluid Intake  I/O: 326/1850  Energy Calories Required: not meeting needs  Protein Required: not meeting needs  Fluid Required: not meeting needs  Comments: LBM-  Tolerance: not tolerating  % Intake of Estimated Energy Needs: Other: NPO  % Meal Intake: NPO    Nutrition Risk  Level of Risk/Frequency of Follow-up: moderate (2x/weekly)     Monitor and Evaluation  Food and Nutrient Intake: energy intake, food and beverage intake  Food and Nutrient Adminstration: diet order  Anthropometric Measurements: weight, weight change, body mass index  Biochemical Data, Medical Tests and Procedures: electrolyte and renal panel, gastrointestinal profile, inflammatory profile, lipid profile     Nutrition Follow-Up  RD Follow-up?: Yes

## 2024-01-30 NOTE — PROGRESS NOTES
[facility] Medicine  Progress Note    Patient Name: Annette Garcia  MRN: [id number]  Patient Class: IP- Inpatient   Admission Date: 1/28/2024  Length of Stay: 2 days  Attending Physician: Graham Kapadia MD  Primary Care Provider: [facility]        Subjective:     Principal Problem:Small bowel obstruction        HPI:  88 years old female with past medical history significant for HTN, seizure disorder, afib on eliquis, LLE DVT, May-Thurner Syndrome, SBO s/p colostomy, and osteoporosis, who came to ER because of vomiting and abdominal pain since yesterday.  Patient reported she had multiple episodes of nonbloody vomiting since yesterday.  Patient reported she has been having abdominal pain since yesterday which is localized to central abdomen, 6-7/10 in severity and nonradiating.  Patient reported she has history of small-bowel obstruction.  Patient also reported feeling nauseous.  Patient denied any other symptoms including chest pain shortness of breath diarrhea fever chills cough or dizziness. CT A&P showed ''distended loops of small bowel with a transition point in the mid abdomen, at an identical location to prior CT dated 12/15/2023.  Findings are concerning for partial small bowel obstruction. Postoperative changes of partial small and large bowel resection with a left lower quadrant colostomy. Small hiatal hernia with mild wall thickening of the distal esophageal wall and minimal paraesophageal fluid''. Chest xray negative for any acute abnormality. US showed UTI. Patient received IV fluid and general surgery has been consulted in ER.  In the ER, blood pressure was 200/93 mm Hg, patient received IV labetalol 10 mg, most recent blood pressure is 147/74 mm Hg.  I also spoke to patient's daughter at bedside.      Overview/Hospital Course:  1/29/274: surgery on board, Gastrografin challenge today    Interval History:   No acute events  NPO  Denies pain  No n/v/d  No  complaints    Review of Systems   Constitutional:  Negative for chills and fever.   Respiratory:  Negative for cough and shortness of breath.    Cardiovascular:  Negative for chest pain.   Gastrointestinal:  Negative for abdominal pain, diarrhea and nausea.   Genitourinary:  Negative for dysuria.   Neurological:  Negative for dizziness and headaches.   Psychiatric/Behavioral:  Negative for confusion.      Objective:     Vital Signs (Most Recent):  Temp: 98 °F (36.7 °C) (01/30/24 0720)  Pulse: 72 (01/30/24 0910)  Resp: 17 (01/30/24 0720)  BP: (!) 158/70 (01/30/24 0720)  SpO2: 98 % (01/30/24 0910) Vital Signs (24h Range):  Temp:  [97.9 °F (36.6 °C)-99.6 °F (37.6 °C)] 98 °F (36.7 °C)  Pulse:  [72-83] 72  Resp:  [16-20] 17  SpO2:  [96 %-100 %] 98 %  BP: (119-175)/(56-74) 158/70     Weight: 64.5 kg (142 lb 3.2 oz)  Body mass index is 30.77 kg/m².    Intake/Output Summary (Last 24 hours) at 1/30/2024 1106  Last data filed at 1/30/2024 0611  Gross per 24 hour   Intake 120.38 ml   Output 1775 ml   Net -1654.62 ml         Physical Exam  Constitutional:       Appearance: Normal appearance.   HENT:      Mouth/Throat:      Mouth: Mucous membranes are moist.      Pharynx: Oropharynx is clear.   Eyes:      Extraocular Movements: Extraocular movements intact.      Conjunctiva/sclera: Conjunctivae normal.   Cardiovascular:      Rate and Rhythm: Normal rate. Rhythm irregular.   Pulmonary:      Effort: Pulmonary effort is normal.      Breath sounds: Normal breath sounds.   Musculoskeletal:         General: Normal range of motion.   Skin:     General: Skin is warm and dry.   Neurological:      General: No focal deficit present.      Mental Status: She is alert and oriented to person, place, and time.   Psychiatric:         Mood and Affect: Mood normal.             Significant Labs: All pertinent labs within the past 24 hours have been reviewed.    Significant Imaging: I have reviewed all pertinent imaging results/findings within the  past 24 hours.    Assessment/Plan:      * Small bowel obstruction    NPO  NG tube placed and intact  prn zofran for nausea control   General surgery on board.  Plan for ex lap Wednesday      Hypertensive urgency  Patient has a current diagnosis of hypertensive urgency (without evidence of end organ damage) which is controlled.  Latest blood pressure and vitals reviewed-   Temp:  [98 °F (36.7 °C)-99.6 °F (37.6 °C)]   Pulse:  [62-83]   Resp:  [16-20]   BP: (119-175)/(56-74)   SpO2:  [96 %-100 %] .   Patient currently off IV antihypertensives.   Home meds for hypertension were reviewed and noted below.   Hypertension Medications               furosemide (LASIX) 20 MG tablet Take 20 mg by mouth once daily.    losartan (COZAAR) 25 MG tablet Take 25 mg by mouth once daily.    metoprolol tartrate (LOPRESSOR) 25 MG tablet Take 25 mg by mouth 2 (two) times daily.            Medication adjustment for hospital antihypertensives is as follows- IV labetalol     Will aim for controlled BP reduction by medications noted above. Monitor and mitigate end organ damage as indicated.    Resume home meds    UTI (urinary tract infection)    Notable history of Acinetobacter, Enterobacter, and Pseudomonas UTI in the past    Plan:  IV cefepime  Urine culture pending - GNR      A-fib  Patient with Persistent (7 days or more) atrial fibrillation which is controlled currently with Beta Blocker. Patient is currently in sinus rhythm.[id number] Score: 4. Anticoagulation indicated. Anticoagulation done with home med eliquis . Holding eliquis per suregry request.    S/P partial resection of colon        Hypothyroidism  Continue home med levothyroxine      Seizure disorder  Resume home meds -trileptal, phenobarb        VTE Risk Mitigation (From admission, onward)           Ordered     IP VTE HIGH RISK PATIENT  Once         01/1937     Place sequential compression device  Until discontinued         01/1937                    Discharge  Planning   JESSICA:      Code Status: Full Code   Is the patient medically ready for discharge?:     Reason for patient still in hospital (select all that apply): Patient trending condition, Laboratory test, Treatment, and Consult recommendations  Discharge Plan A: Return to nursing home   Discharge Delays: None known at this time              Graham Kapadia MD  Department of Hospital Medicine   [facility]

## 2024-01-30 NOTE — PLAN OF CARE
Recommendations  Recommendation:   1. When medically acceptable, initiate clear liquid diet. 2. Monitor need for nutrition support   3. Monitor weight and labs    Goals: Pt will be on a diet by RD follow up    Nutrition Goal Status: new  Communication of RD Recs: other (comment) (POC)

## 2024-01-30 NOTE — PROGRESS NOTES
"[facility]           Pharmacy  Admission Medication Reconciliation     Based on information gathered for medication list, you may go to "Admission" then "Reconcile Home Medications" tabs to review and/or act upon those items.     The home medication list has been updated by the Pharmacy department.   Please read ALL comments highlighted in red.   Please address this information as you see fit.    Feel free to contact us if you have any questions or require assistance.    Home medication list has been compared to current inpatient medications. Please review the following discrepancies noted below:      Patient reports STILL TAKING the following medication(s) which was not ordered upon admit  Apixaban  Docusate  Furosemide  Magnesium oxide  Polyethylene glycol  Potassium chloride    Feel free to contact us if you have any questions or require assistance.    Ivette Bermudez, PharmD  [phone]        "

## 2024-01-30 NOTE — SUBJECTIVE & OBJECTIVE
Interval History:   No acute events  NPO  Denies pain  No n/v/d  No complaints    Review of Systems   Constitutional:  Negative for chills and fever.   Respiratory:  Negative for cough and shortness of breath.    Cardiovascular:  Negative for chest pain.   Gastrointestinal:  Negative for abdominal pain, diarrhea and nausea.   Genitourinary:  Negative for dysuria.   Neurological:  Negative for dizziness and headaches.   Psychiatric/Behavioral:  Negative for confusion.      Objective:     Vital Signs (Most Recent):  Temp: 98 °F (36.7 °C) (01/30/24 0720)  Pulse: 72 (01/30/24 0910)  Resp: 17 (01/30/24 0720)  BP: (!) 158/70 (01/30/24 0720)  SpO2: 98 % (01/30/24 0910) Vital Signs (24h Range):  Temp:  [97.9 °F (36.6 °C)-99.6 °F (37.6 °C)] 98 °F (36.7 °C)  Pulse:  [72-83] 72  Resp:  [16-20] 17  SpO2:  [96 %-100 %] 98 %  BP: (119-175)/(56-74) 158/70     Weight: 64.5 kg (142 lb 3.2 oz)  Body mass index is 30.77 kg/m².    Intake/Output Summary (Last 24 hours) at 1/30/2024 1106  Last data filed at 1/30/2024 0611  Gross per 24 hour   Intake 120.38 ml   Output 1775 ml   Net -1654.62 ml         Physical Exam  Constitutional:       Appearance: Normal appearance.   HENT:      Mouth/Throat:      Mouth: Mucous membranes are moist.      Pharynx: Oropharynx is clear.   Eyes:      Extraocular Movements: Extraocular movements intact.      Conjunctiva/sclera: Conjunctivae normal.   Cardiovascular:      Rate and Rhythm: Normal rate. Rhythm irregular.   Pulmonary:      Effort: Pulmonary effort is normal.      Breath sounds: Normal breath sounds.   Musculoskeletal:         General: Normal range of motion.   Skin:     General: Skin is warm and dry.   Neurological:      General: No focal deficit present.      Mental Status: She is alert and oriented to person, place, and time.   Psychiatric:         Mood and Affect: Mood normal.             Significant Labs: All pertinent labs within the past 24 hours have been reviewed.    Significant Imaging:  I have reviewed all pertinent imaging results/findings within the past 24 hours.

## 2024-01-30 NOTE — PROGRESS NOTES
[facility]  General Surgery  Progress Note    Subjective:     History of Present Illness:  Annette Garcia is a 88 y.o. female with a history of a fib (on eliquis), CAD, HTN, seizure disorder, LLE DVT, May-Thurner Syndrome, osteoporosis, and remote history of colon resection w/ end colostomy c/b parastomal hernia requiring repair (most recently March 2023) who presents with abdominal pain associated with nausea and vomiting. States that she has also noticed decreased colostomy output over the last three days. In the Ed, the patient is afebrile and hypertensive but otherwise hemodynamically stable. An NGT was placed with minimal return. CT A/P w/ distended loops of small bowel and a transition point in the mid abdomen (also viewed on CT from 12/15/23) and mild wall thickening of the distal esophagus w/ small hiatal hernia. Patient was admitted to hospital medicine. General surgery consulted for small bowel obstruction.         Post-Op Info:  Procedure(s) (LRB):  ROBOTIC LAPAROSCOPY, EXPLORATORY (N/A)         Interval History:   No acute events overnight. Gastro graffin challenge yesterday with contrast throughout the colon on both 4 and 8 hour films. 950cc of ostomy output over the last 24 hours. Minimal, thin drainage from NGT. Abdominal pain has improved.     Medications:  Continuous Infusions:  Scheduled Meds:   ceFEPime IV (PEDS and ADULTS)  1 g Intravenous Q12H    levothyroxine  125 mcg Oral Daily    metoprolol tartrate  25 mg Oral BID    pantoprazole  40 mg Oral Daily    pravastatin  20 mg Oral QHS     PRN Meds:sodium chloride 0.9%, dextrose 10%, dextrose 10%, dextrose 10%, dextrose 10%, glucagon (human recombinant), glucagon (human recombinant), glucose, glucose, influenza 65up-adj, naloxone, ondansetron, sodium chloride 0.9%     Review of patient's allergies indicates:   Allergen Reactions    Adhesive Itching and Blisters    Penicillins Anaphylaxis    Tramadol Hives    Avelox [moxifloxacin] Rash      Facial and arm itching and redness. Pt states throat closes when given.    Amoxil [amoxicillin]     Aspridrox [aspirin, buffered]     Codeine Other (See Comments)     Throat swelling    Keflex [cephalexin]      Tolerated cefepime and cefazolin    Norvasc [amlodipine]     Red dye Hives    Robitussin [guaifenesin]     Sulfa (sulfonamide antibiotics)     Tylenol [acetaminophen]      Has reaction to Tylenol with red dye and unable to take Extra Strength Tylenol/ CAN ONLY TOLERATE REG STRENGTH TYLENOL    Vicks vaporub [camphor-eucalyptus oil-menthol]      Objective:     Vital Signs (Most Recent):  Temp: 98 °F (36.7 °C) (01/30/24 0720)  Pulse: 76 (01/30/24 0720)  Resp: 17 (01/30/24 0720)  BP: (!) 158/70 (01/30/24 0720)  SpO2: 98 % (01/30/24 0720) Vital Signs (24h Range):  Temp:  [97.9 °F (36.6 °C)-99.6 °F (37.6 °C)] 98 °F (36.7 °C)  Pulse:  [72-83] 76  Resp:  [16-20] 17  SpO2:  [96 %-100 %] 98 %  BP: (119-175)/(56-74) 158/70     Weight: 64.5 kg (142 lb 3.2 oz)  Body mass index is 30.77 kg/m².    Intake/Output - Last 3 Shifts         01/28 0700 01/29 0659 01/29 0700 01/30 0659 01/30 0700 01/31 0659    I.V. (mL/kg)  34 (0.5)     IV Piggyback  292.8     Total Intake(mL/kg)  326.8 (5.1)     Urine (mL/kg/hr) 550 200 (0.1)     Drains  350     Stool  1300     Total Output 550 1850     Net -550 -1523.2                     Physical Exam  Vitals reviewed.   Constitutional:       General: She is not in acute distress.     Appearance: Normal appearance. She is not toxic-appearing.   HENT:      Head: Normocephalic and atraumatic.      Nose:      Comments: NGT to LIWS     Mouth/Throat:      Mouth: Mucous membranes are moist.   Cardiovascular:      Rate and Rhythm: Normal rate.      Pulses: Normal pulses.   Pulmonary:      Effort: Pulmonary effort is normal. No respiratory distress.   Abdominal:      General: Abdomen is flat.      Palpations: Abdomen is soft.      Comments: L colostomy with ostomy appliance overlaying  Mid  epigastrium tenderness has resolved this am      Skin:     General: Skin is warm.   Neurological:      General: No focal deficit present.      Mental Status: She is alert and oriented to person, place, and time.   Psychiatric:         Mood and Affect: Mood normal.         Behavior: Behavior normal.          Significant Labs:  I have reviewed all pertinent lab results within the past 24 hours.  CBC:   Recent Labs   Lab 01/29/24  0425   WBC 10.59   RBC 3.29*   HGB 10.8*   HCT 32.6*      MCV 99*   MCH 32.8*   MCHC 33.1     CMP:   Recent Labs   Lab 01/29/24  0425   *   CALCIUM 9.3   ALBUMIN 3.6   PROT 6.8      K 3.7   CO2 28   CL 99   BUN 14   CREATININE 0.9   ALKPHOS 83   ALT 15   AST 14   BILITOT 0.3       Significant Diagnostics:  I have reviewed all pertinent imaging results/findings within the past 24 hours.      X-Ray Abdomen AP 1 View  1/29/2024    Single AP view of the abdomen was obtained 4 hours following administration of 100 mL Gastroview contrast.  NG tube is seen with its tip in the mid to distal body of the stomach.  A small amount of contrast material can be seen within the stomach.  Some contrast material can be seen within nondilated small bowel loops at the left side of the abdomen.  A significant amount of contrast material is seen throughout the colon which does not appear significantly dilated.  No free air.  Nothing to suggest extravasation contrast material.  Surgical clips at the right upper quadrant of the abdomen.  Extensive aortoiliac atherosclerosis.  Metallic stent is seen in the region of the left common iliac and external iliac vein.  Generalized osteopenia.         Assessment/Plan:     S/P partial resection of colon  88 F with a history of a fib (on eliquis), CAD, HTN, seizure disorder, LLE DVT, May-Thurner Syndrome, osteoporosis, and remote history of colon resection w/ end colostomy c/b parastomal hernia requiring repair (most recently March 2023) who presents with  abdominal pain associated with nausea and vomiting. CT A/P with dilated loops of small bowel w/ transition point in the mid abdomen. This appears similar to scan on 12/15/23. Will treat conservatively with NGT decompression and bowel rest. Gastrograffin challenge 1/29 with contrast throughout the colon on 4 and 8 hour films. 950cc of ostomy output yesterday and minimal, thin NGT output. Although her improvement is encouraging, given her multiple recent admissions with the same problem, she would benefit from a diagnostic lap. Will plan to keep her NGT in place until surgery for optimal decompression.     -- to OR tomorrow for diagnostic lap   -- NPO, mIVF  -- NGT to LIWS  -- MM pain and nausea control   -- hold eliquis   -- remainder of care per primary team           Swapna Chan MD  General Surgery  [facility]

## 2024-01-30 NOTE — ASSESSMENT & PLAN NOTE
NPO  NG tube placed and intact  prn zofran for nausea control   General surgery on board.  Plan for ex lap Wednesday

## 2024-01-30 NOTE — ASSESSMENT & PLAN NOTE
Patient with Persistent (7 days or more) atrial fibrillation which is controlled currently with Beta Blocker. Patient is currently in sinus rhythm.[id number] Score: 4. Anticoagulation indicated. Anticoagulation done with home med eliquis . Holding eliquis per suregry request.

## 2024-01-30 NOTE — ASSESSMENT & PLAN NOTE
Notable history of Acinetobacter, Enterobacter, and Pseudomonas UTI in the past    Plan:  IV cefepime  Urine culture pending - GNR

## 2024-01-30 NOTE — PT/OT/SLP PROGRESS
Physical Therapy      Patient Name:  Annette Garcia   MRN:  [id number]    Patient not seen today secondary to Therapist assessment; spoke with nursing prior to PT Evaluation (pt has LLE DVT and anticoagulants currently being held due to pending surgery tomorrow.) Will follow-up post surgery for proper PT evaluation.    1/20/24

## 2024-01-30 NOTE — PLAN OF CARE
POC reviewed with pt, following- VSS, NADN, pt resting quietly this shift. IV ABX per order. NG tube to LIWS draining dark green output. Pt denies nausea/pain throughout night. LLQ colostomy in place, light brown liquid output, 950 mL overnight. No falls or injuries noted, CB in reach at all times. Instructed to call for needs not met on rounds, v/u.

## 2024-01-30 NOTE — SUBJECTIVE & OBJECTIVE
Interval History:   No acute events overnight. Gastro graffin challenge yesterday with contrast throughout the colon on both 4 and 8 hour films. 950cc of ostomy output over the last 24 hours. Minimal, thin drainage from NGT. Abdominal pain has improved.     Medications:  Continuous Infusions:  Scheduled Meds:   ceFEPime IV (PEDS and ADULTS)  1 g Intravenous Q12H    levothyroxine  125 mcg Oral Daily    metoprolol tartrate  25 mg Oral BID    pantoprazole  40 mg Oral Daily    pravastatin  20 mg Oral QHS     PRN Meds:sodium chloride 0.9%, dextrose 10%, dextrose 10%, dextrose 10%, dextrose 10%, glucagon (human recombinant), glucagon (human recombinant), glucose, glucose, influenza 65up-adj, naloxone, ondansetron, sodium chloride 0.9%     Review of patient's allergies indicates:   Allergen Reactions    Adhesive Itching and Blisters    Penicillins Anaphylaxis    Tramadol Hives    Avelox [moxifloxacin] Rash     Facial and arm itching and redness. Pt states throat closes when given.    Amoxil [amoxicillin]     Aspridrox [aspirin, buffered]     Codeine Other (See Comments)     Throat swelling    Keflex [cephalexin]      Tolerated cefepime and cefazolin    Norvasc [amlodipine]     Red dye Hives    Robitussin [guaifenesin]     Sulfa (sulfonamide antibiotics)     Tylenol [acetaminophen]      Has reaction to Tylenol with red dye and unable to take Extra Strength Tylenol/ CAN ONLY TOLERATE REG STRENGTH TYLENOL    Vicks vaporub [camphor-eucalyptus oil-menthol]      Objective:     Vital Signs (Most Recent):  Temp: 98 °F (36.7 °C) (01/30/24 0720)  Pulse: 76 (01/30/24 0720)  Resp: 17 (01/30/24 0720)  BP: (!) 158/70 (01/30/24 0720)  SpO2: 98 % (01/30/24 0720) Vital Signs (24h Range):  Temp:  [97.9 °F (36.6 °C)-99.6 °F (37.6 °C)] 98 °F (36.7 °C)  Pulse:  [72-83] 76  Resp:  [16-20] 17  SpO2:  [96 %-100 %] 98 %  BP: (119-175)/(56-74) 158/70     Weight: 64.5 kg (142 lb 3.2 oz)  Body mass index is 30.77 kg/m².    Intake/Output - Last 3  Shifts         01/28 0700  01/29 0659 01/29 0700  01/30 0659 01/30 0700 01/31 0659    I.V. (mL/kg)  34 (0.5)     IV Piggyback  292.8     Total Intake(mL/kg)  326.8 (5.1)     Urine (mL/kg/hr) 550 200 (0.1)     Drains  350     Stool  1300     Total Output 550 1850     Net -550 -1523.2                     Physical Exam  Vitals reviewed.   Constitutional:       General: She is not in acute distress.     Appearance: Normal appearance. She is not toxic-appearing.   HENT:      Head: Normocephalic and atraumatic.      Nose:      Comments: NGT to LIWS     Mouth/Throat:      Mouth: Mucous membranes are moist.   Cardiovascular:      Rate and Rhythm: Normal rate.      Pulses: Normal pulses.   Pulmonary:      Effort: Pulmonary effort is normal. No respiratory distress.   Abdominal:      General: Abdomen is flat.      Palpations: Abdomen is soft.      Comments: L colostomy with ostomy appliance overlaying  Mid epigastrium tenderness has resolved this am      Skin:     General: Skin is warm.   Neurological:      General: No focal deficit present.      Mental Status: She is alert and oriented to person, place, and time.   Psychiatric:         Mood and Affect: Mood normal.         Behavior: Behavior normal.          Significant Labs:  I have reviewed all pertinent lab results within the past 24 hours.  CBC:   Recent Labs   Lab 01/29/24  0425   WBC 10.59   RBC 3.29*   HGB 10.8*   HCT 32.6*      MCV 99*   MCH 32.8*   MCHC 33.1     CMP:   Recent Labs   Lab 01/29/24  0425   *   CALCIUM 9.3   ALBUMIN 3.6   PROT 6.8      K 3.7   CO2 28   CL 99   BUN 14   CREATININE 0.9   ALKPHOS 83   ALT 15   AST 14   BILITOT 0.3       Significant Diagnostics:  I have reviewed all pertinent imaging results/findings within the past 24 hours.      X-Ray Abdomen AP 1 View  1/29/2024    Single AP view of the abdomen was obtained 4 hours following administration of 100 mL Gastroview contrast.  NG tube is seen with its tip in the mid to  distal body of the stomach.  A small amount of contrast material can be seen within the stomach.  Some contrast material can be seen within nondilated small bowel loops at the left side of the abdomen.  A significant amount of contrast material is seen throughout the colon which does not appear significantly dilated.  No free air.  Nothing to suggest extravasation contrast material.  Surgical clips at the right upper quadrant of the abdomen.  Extensive aortoiliac atherosclerosis.  Metallic stent is seen in the region of the left common iliac and external iliac vein.  Generalized osteopenia.

## 2024-01-31 ENCOUNTER — ANESTHESIA (OUTPATIENT)
Dept: SURGERY | Facility: HOSPITAL | Age: 89
DRG: 336 | End: 2024-01-31
Payer: MEDICARE

## 2024-01-31 ENCOUNTER — ANESTHESIA EVENT (OUTPATIENT)
Dept: SURGERY | Facility: HOSPITAL | Age: 89
DRG: 336 | End: 2024-01-31
Payer: MEDICARE

## 2024-01-31 LAB
ANION GAP SERPL CALC-SCNC: 14 MMOL/L (ref 8–16)
BACTERIA UR CULT: ABNORMAL
BASOPHILS # BLD AUTO: 0.03 K/UL (ref 0–0.2)
BASOPHILS NFR BLD: 0.5 % (ref 0–1.9)
BUN SERPL-MCNC: 19 MG/DL (ref 8–23)
CALCIUM SERPL-MCNC: 9 MG/DL (ref 8.7–10.5)
CHLORIDE SERPL-SCNC: 99 MMOL/L (ref 95–110)
CO2 SERPL-SCNC: 26 MMOL/L (ref 23–29)
CREAT SERPL-MCNC: 0.7 MG/DL (ref 0.5–1.4)
DIFFERENTIAL METHOD BLD: ABNORMAL
EOSINOPHIL # BLD AUTO: 0.3 K/UL (ref 0–0.5)
EOSINOPHIL NFR BLD: 4.2 % (ref 0–8)
ERYTHROCYTE [DISTWIDTH] IN BLOOD BY AUTOMATED COUNT: 13.7 % (ref 11.5–14.5)
EST. GFR  (NO RACE VARIABLE): >60 ML/MIN/1.73 M^2
GLUCOSE SERPL-MCNC: 84 MG/DL (ref 70–110)
HCT VFR BLD AUTO: 30.4 % (ref 37–48.5)
HGB BLD-MCNC: 10.1 G/DL (ref 12–16)
IMM GRANULOCYTES # BLD AUTO: 0.02 K/UL (ref 0–0.04)
IMM GRANULOCYTES NFR BLD AUTO: 0.3 % (ref 0–0.5)
LYMPHOCYTES # BLD AUTO: 1.1 K/UL (ref 1–4.8)
LYMPHOCYTES NFR BLD: 17.2 % (ref 18–48)
MCH RBC QN AUTO: 33 PG (ref 27–31)
MCHC RBC AUTO-ENTMCNC: 33.2 G/DL (ref 32–36)
MCV RBC AUTO: 99 FL (ref 82–98)
MONOCYTES # BLD AUTO: 0.6 K/UL (ref 0.3–1)
MONOCYTES NFR BLD: 9.4 % (ref 4–15)
NEUTROPHILS # BLD AUTO: 4.4 K/UL (ref 1.8–7.7)
NEUTROPHILS NFR BLD: 68.4 % (ref 38–73)
NRBC BLD-RTO: 0 /100 WBC
PLATELET # BLD AUTO: 174 K/UL (ref 150–450)
PMV BLD AUTO: 9.8 FL (ref 9.2–12.9)
POCT GLUCOSE: 82 MG/DL (ref 70–110)
POTASSIUM SERPL-SCNC: 3.7 MMOL/L (ref 3.5–5.1)
RBC # BLD AUTO: 3.06 M/UL (ref 4–5.4)
SODIUM SERPL-SCNC: 139 MMOL/L (ref 136–145)
WBC # BLD AUTO: 6.4 K/UL (ref 3.9–12.7)

## 2024-01-31 PROCEDURE — 37000008 HC ANESTHESIA 1ST 15 MINUTES: Performed by: STUDENT IN AN ORGANIZED HEALTH CARE EDUCATION/TRAINING PROGRAM

## 2024-01-31 PROCEDURE — D9220A PRA ANESTHESIA: Mod: CRNA,,, | Performed by: NURSE ANESTHETIST, CERTIFIED REGISTERED

## 2024-01-31 PROCEDURE — 0DNU4ZZ RELEASE OMENTUM, PERCUTANEOUS ENDOSCOPIC APPROACH: ICD-10-PCS | Performed by: STUDENT IN AN ORGANIZED HEALTH CARE EDUCATION/TRAINING PROGRAM

## 2024-01-31 PROCEDURE — 80048 BASIC METABOLIC PNL TOTAL CA: CPT | Performed by: HOSPITALIST

## 2024-01-31 PROCEDURE — 36415 COLL VENOUS BLD VENIPUNCTURE: CPT | Performed by: HOSPITALIST

## 2024-01-31 PROCEDURE — 71000033 HC RECOVERY, INTIAL HOUR: Performed by: STUDENT IN AN ORGANIZED HEALTH CARE EDUCATION/TRAINING PROGRAM

## 2024-01-31 PROCEDURE — 21400001 HC TELEMETRY ROOM

## 2024-01-31 PROCEDURE — D9220A PRA ANESTHESIA: Mod: ANES,,, | Performed by: ANESTHESIOLOGY

## 2024-01-31 PROCEDURE — 63600175 PHARM REV CODE 636 W HCPCS: Performed by: NURSE ANESTHETIST, CERTIFIED REGISTERED

## 2024-01-31 PROCEDURE — 63600175 PHARM REV CODE 636 W HCPCS: Performed by: FAMILY MEDICINE

## 2024-01-31 PROCEDURE — 94761 N-INVAS EAR/PLS OXIMETRY MLT: CPT

## 2024-01-31 PROCEDURE — 36000711: Performed by: STUDENT IN AN ORGANIZED HEALTH CARE EDUCATION/TRAINING PROGRAM

## 2024-01-31 PROCEDURE — 25000003 PHARM REV CODE 250: Performed by: INTERNAL MEDICINE

## 2024-01-31 PROCEDURE — 44180 LAP ENTEROLYSIS: CPT | Mod: ,,, | Performed by: STUDENT IN AN ORGANIZED HEALTH CARE EDUCATION/TRAINING PROGRAM

## 2024-01-31 PROCEDURE — G0008 ADMIN INFLUENZA VIRUS VAC: HCPCS | Performed by: FAMILY MEDICINE

## 2024-01-31 PROCEDURE — 25000003 PHARM REV CODE 250: Performed by: NURSE ANESTHETIST, CERTIFIED REGISTERED

## 2024-01-31 PROCEDURE — 27000221 HC OXYGEN, UP TO 24 HOURS

## 2024-01-31 PROCEDURE — 63600175 PHARM REV CODE 636 W HCPCS: Mod: JG | Performed by: STUDENT IN AN ORGANIZED HEALTH CARE EDUCATION/TRAINING PROGRAM

## 2024-01-31 PROCEDURE — 8E0W4CZ ROBOTIC ASSISTED PROCEDURE OF TRUNK REGION, PERCUTANEOUS ENDOSCOPIC APPROACH: ICD-10-PCS | Performed by: STUDENT IN AN ORGANIZED HEALTH CARE EDUCATION/TRAINING PROGRAM

## 2024-01-31 PROCEDURE — 99232 SBSQ HOSP IP/OBS MODERATE 35: CPT | Mod: ,,, | Performed by: STUDENT IN AN ORGANIZED HEALTH CARE EDUCATION/TRAINING PROGRAM

## 2024-01-31 PROCEDURE — 63600175 PHARM REV CODE 636 W HCPCS: Performed by: INTERNAL MEDICINE

## 2024-01-31 PROCEDURE — 99222 1ST HOSP IP/OBS MODERATE 55: CPT | Mod: ,,, | Performed by: INTERNAL MEDICINE

## 2024-01-31 PROCEDURE — 85025 COMPLETE CBC W/AUTO DIFF WBC: CPT | Performed by: HOSPITALIST

## 2024-01-31 PROCEDURE — 25000003 PHARM REV CODE 250: Performed by: STUDENT IN AN ORGANIZED HEALTH CARE EDUCATION/TRAINING PROGRAM

## 2024-01-31 PROCEDURE — 37000009 HC ANESTHESIA EA ADD 15 MINS: Performed by: STUDENT IN AN ORGANIZED HEALTH CARE EDUCATION/TRAINING PROGRAM

## 2024-01-31 PROCEDURE — 90471 IMMUNIZATION ADMIN: CPT | Performed by: FAMILY MEDICINE

## 2024-01-31 PROCEDURE — 99900035 HC TECH TIME PER 15 MIN (STAT)

## 2024-01-31 PROCEDURE — 27201423 OPTIME MED/SURG SUP & DEVICES STERILE SUPPLY: Performed by: STUDENT IN AN ORGANIZED HEALTH CARE EDUCATION/TRAINING PROGRAM

## 2024-01-31 PROCEDURE — 25000003 PHARM REV CODE 250: Performed by: HOSPITALIST

## 2024-01-31 PROCEDURE — 90694 VACC AIIV4 NO PRSRV 0.5ML IM: CPT | Performed by: FAMILY MEDICINE

## 2024-01-31 PROCEDURE — 71000039 HC RECOVERY, EACH ADD'L HOUR: Performed by: STUDENT IN AN ORGANIZED HEALTH CARE EDUCATION/TRAINING PROGRAM

## 2024-01-31 PROCEDURE — 36000710: Performed by: STUDENT IN AN ORGANIZED HEALTH CARE EDUCATION/TRAINING PROGRAM

## 2024-01-31 RX ORDER — FENTANYL CITRATE 50 UG/ML
INJECTION, SOLUTION INTRAMUSCULAR; INTRAVENOUS
Status: DISCONTINUED | OUTPATIENT
Start: 2024-01-31 | End: 2024-01-31

## 2024-01-31 RX ORDER — LIDOCAINE HYDROCHLORIDE 10 MG/ML
INJECTION, SOLUTION EPIDURAL; INFILTRATION; INTRACAUDAL; PERINEURAL
Status: DISCONTINUED | OUTPATIENT
Start: 2024-01-31 | End: 2024-01-31 | Stop reason: HOSPADM

## 2024-01-31 RX ORDER — ROCURONIUM BROMIDE 10 MG/ML
INJECTION, SOLUTION INTRAVENOUS
Status: DISCONTINUED | OUTPATIENT
Start: 2024-01-31 | End: 2024-01-31

## 2024-01-31 RX ORDER — ONDANSETRON HYDROCHLORIDE 2 MG/ML
4 INJECTION, SOLUTION INTRAVENOUS ONCE AS NEEDED
Status: DISCONTINUED | OUTPATIENT
Start: 2024-01-31 | End: 2024-01-31

## 2024-01-31 RX ORDER — HYDROMORPHONE HYDROCHLORIDE 2 MG/ML
0.5 INJECTION, SOLUTION INTRAMUSCULAR; INTRAVENOUS; SUBCUTANEOUS EVERY 5 MIN PRN
Status: DISCONTINUED | OUTPATIENT
Start: 2024-01-31 | End: 2024-01-31

## 2024-01-31 RX ORDER — ONDANSETRON HYDROCHLORIDE 2 MG/ML
INJECTION, SOLUTION INTRAVENOUS
Status: DISCONTINUED | OUTPATIENT
Start: 2024-01-31 | End: 2024-01-31

## 2024-01-31 RX ORDER — SODIUM CHLORIDE 0.9 % (FLUSH) 0.9 %
10 SYRINGE (ML) INJECTION
Status: DISCONTINUED | OUTPATIENT
Start: 2024-01-31 | End: 2024-01-31

## 2024-01-31 RX ORDER — BUPIVACAINE HYDROCHLORIDE 5 MG/ML
INJECTION, SOLUTION PERINEURAL
Status: DISCONTINUED | OUTPATIENT
Start: 2024-01-31 | End: 2024-01-31 | Stop reason: HOSPADM

## 2024-01-31 RX ORDER — PROPOFOL 10 MG/ML
VIAL (ML) INTRAVENOUS
Status: DISCONTINUED | OUTPATIENT
Start: 2024-01-31 | End: 2024-01-31

## 2024-01-31 RX ORDER — DEXAMETHASONE SODIUM PHOSPHATE 4 MG/ML
INJECTION, SOLUTION INTRA-ARTICULAR; INTRALESIONAL; INTRAMUSCULAR; INTRAVENOUS; SOFT TISSUE
Status: DISCONTINUED | OUTPATIENT
Start: 2024-01-31 | End: 2024-01-31

## 2024-01-31 RX ORDER — SODIUM CHLORIDE 9 MG/ML
INJECTION, SOLUTION INTRAVENOUS CONTINUOUS
Status: DISCONTINUED | OUTPATIENT
Start: 2024-01-31 | End: 2024-01-31

## 2024-01-31 RX ORDER — HYDROCODONE BITARTRATE AND ACETAMINOPHEN 5; 325 MG/1; MG/1
1 TABLET ORAL EVERY 4 HOURS PRN
Status: DISCONTINUED | OUTPATIENT
Start: 2024-01-31 | End: 2024-02-02 | Stop reason: HOSPADM

## 2024-01-31 RX ORDER — SUCCINYLCHOLINE CHLORIDE 20 MG/ML
INJECTION INTRAMUSCULAR; INTRAVENOUS
Status: DISCONTINUED | OUTPATIENT
Start: 2024-01-31 | End: 2024-01-31

## 2024-01-31 RX ORDER — LIDOCAINE HYDROCHLORIDE 20 MG/ML
INJECTION INTRAVENOUS
Status: DISCONTINUED | OUTPATIENT
Start: 2024-01-31 | End: 2024-01-31

## 2024-01-31 RX ORDER — PHENYLEPHRINE HYDROCHLORIDE 10 MG/ML
INJECTION INTRAVENOUS
Status: DISCONTINUED | OUTPATIENT
Start: 2024-01-31 | End: 2024-01-31

## 2024-01-31 RX ADMIN — LOSARTAN POTASSIUM 25 MG: 25 TABLET, FILM COATED ORAL at 08:01

## 2024-01-31 RX ADMIN — LEVOTHYROXINE SODIUM 125 MCG: 25 TABLET ORAL at 08:01

## 2024-01-31 RX ADMIN — METOPROLOL TARTRATE 25 MG: 25 TABLET, FILM COATED ORAL at 08:01

## 2024-01-31 RX ADMIN — SUCCINYLCHOLINE CHLORIDE 80 MG: 20 INJECTION, SOLUTION INTRAMUSCULAR; INTRAVENOUS at 02:01

## 2024-01-31 RX ADMIN — PHENYLEPHRINE HYDROCHLORIDE 100 MCG: 10 INJECTION INTRAVENOUS at 02:01

## 2024-01-31 RX ADMIN — PROPOFOL 30 MG: 10 INJECTION, EMULSION INTRAVENOUS at 02:01

## 2024-01-31 RX ADMIN — PHENYLEPHRINE HYDROCHLORIDE 200 MCG: 10 INJECTION INTRAVENOUS at 02:01

## 2024-01-31 RX ADMIN — CEFTRIAXONE SODIUM 1 G: 1 INJECTION, POWDER, FOR SOLUTION INTRAMUSCULAR; INTRAVENOUS at 02:01

## 2024-01-31 RX ADMIN — ONDANSETRON 8 MG: 2 INJECTION, SOLUTION INTRAMUSCULAR; INTRAVENOUS at 03:01

## 2024-01-31 RX ADMIN — SODIUM CHLORIDE: 9 INJECTION, SOLUTION INTRAVENOUS at 10:01

## 2024-01-31 RX ADMIN — FENTANYL CITRATE 100 MCG: 50 INJECTION, SOLUTION INTRAMUSCULAR; INTRAVENOUS at 02:01

## 2024-01-31 RX ADMIN — SODIUM CHLORIDE, SODIUM LACTATE, POTASSIUM CHLORIDE, AND CALCIUM CHLORIDE: .6; .31; .03; .02 INJECTION, SOLUTION INTRAVENOUS at 02:01

## 2024-01-31 RX ADMIN — PRAVASTATIN SODIUM 20 MG: 10 TABLET ORAL at 08:01

## 2024-01-31 RX ADMIN — LIDOCAINE HYDROCHLORIDE 100 MG: 20 INJECTION, SOLUTION INTRAVENOUS at 02:01

## 2024-01-31 RX ADMIN — PANTOPRAZOLE SODIUM 40 MG: 40 TABLET, DELAYED RELEASE ORAL at 08:01

## 2024-01-31 RX ADMIN — ROCURONIUM BROMIDE 5 MG: 10 INJECTION, SOLUTION INTRAVENOUS at 02:01

## 2024-01-31 RX ADMIN — PROPOFOL 50 MG: 10 INJECTION, EMULSION INTRAVENOUS at 02:01

## 2024-01-31 RX ADMIN — PROPOFOL 40 MG: 10 INJECTION, EMULSION INTRAVENOUS at 03:01

## 2024-01-31 RX ADMIN — CEFEPIME 1 G: 1 INJECTION, POWDER, FOR SOLUTION INTRAMUSCULAR; INTRAVENOUS at 10:01

## 2024-01-31 RX ADMIN — ROCURONIUM BROMIDE 45 MG: 10 INJECTION, SOLUTION INTRAVENOUS at 02:01

## 2024-01-31 RX ADMIN — PHENOBARBITAL 97.2 MG: 32.4 TABLET ORAL at 08:01

## 2024-01-31 RX ADMIN — DEXAMETHASONE SODIUM PHOSPHATE 8 MG: 4 INJECTION, SOLUTION INTRA-ARTICULAR; INTRALESIONAL; INTRAMUSCULAR; INTRAVENOUS; SOFT TISSUE at 03:01

## 2024-01-31 RX ADMIN — SUGAMMADEX 200 MG: 100 INJECTION, SOLUTION INTRAVENOUS at 03:01

## 2024-01-31 RX ADMIN — OXCARBAZEPINE 150 MG: 150 TABLET, FILM COATED ORAL at 08:01

## 2024-01-31 RX ADMIN — HYPROMELLOSE 2910 2 DROP: 5 SOLUTION OPHTHALMIC at 02:01

## 2024-01-31 RX ADMIN — CEFEPIME 1 G: 1 INJECTION, POWDER, FOR SOLUTION INTRAMUSCULAR; INTRAVENOUS at 11:01

## 2024-01-31 NOTE — ASSESSMENT & PLAN NOTE
Patient with Persistent (7 days or more) atrial fibrillation which is controlled currently with Beta Blocker. Patient is currently in sinus rhythm.[id number] Score: 4. Anticoagulation indicated. Anticoagulation done with home med eliquis . Holding eliquis per suregry request.

## 2024-01-31 NOTE — PLAN OF CARE
01/31/24 1100   Rounds   Attendance Nurse ;Provider   Discharge Plan A Return to nursing home  ([facility])   Why the patient remains in the hospital Requires continued medical care   Transition of Care Barriers Transportation       CM was informed by Dr Kapadia that the pt is not medically stable to discharge due to robotic exp lap to be done by Dr Johnson today.     1235  Patient resting quietly in bed with niece, Genna Collins ([phone]), at the bedside when CM rounded. Patient was admitted with SBO and is being followed by gen surg, ID, nut, & PT. Right NG to LIWS noted & colostomy intact. Pox 97% on 1L O2 via NC this AM.    Patient has been a resident at [facility] for the past 4 years, uses NH equipment to assist with ambulation, & will need assistance with [employer] van transportation at time of discharge.     CM updated patient's whiteboard with CM name & contact information.     1400  Scheduled hospital follow up appointment with Dr Johnson (gen surg) on 2/21/2024 at 1340 noted. Information added to the pt's discharge paperwork.     Updated notes sent to [facility] via Interventional Imaging.       Will continue to follow.

## 2024-01-31 NOTE — SUBJECTIVE & OBJECTIVE
Interval History:   No acute events  Family at bedside  NPO  Denies pain  No n/v/d  No complaints    Review of Systems   Constitutional:  Negative for chills and fever.   Respiratory:  Negative for cough and shortness of breath.    Cardiovascular:  Negative for chest pain.   Gastrointestinal:  Negative for abdominal pain, diarrhea and nausea.   Genitourinary:  Negative for dysuria.   Neurological:  Negative for dizziness and headaches.   Psychiatric/Behavioral:  Negative for confusion.      Objective:     Vital Signs (Most Recent):  Temp: 97.4 °F (36.3 °C) (01/31/24 1112)  Pulse: 70 (01/31/24 1112)  Resp: 18 (01/31/24 1112)  BP: (!) 128/58 (01/31/24 1112)  SpO2: (!) 93 % (01/31/24 1112) Vital Signs (24h Range):  Temp:  [97.4 °F (36.3 °C)-98.4 °F (36.9 °C)] 97.4 °F (36.3 °C)  Pulse:  [] 70  Resp:  [18-22] 18  SpO2:  [93 %-99 %] 93 %  BP: (111-168)/(55-70) 128/58     Weight: 68.2 kg (150 lb 5.7 oz)  Body mass index is 32.54 kg/m².    Intake/Output Summary (Last 24 hours) at 1/31/2024 1225  Last data filed at 1/31/2024 1100  Gross per 24 hour   Intake 240.77 ml   Output 1020 ml   Net -779.23 ml           Physical Exam  Constitutional:       Appearance: Normal appearance.   HENT:      Mouth/Throat:      Mouth: Mucous membranes are moist.      Pharynx: Oropharynx is clear.   Eyes:      Extraocular Movements: Extraocular movements intact.      Conjunctiva/sclera: Conjunctivae normal.   Cardiovascular:      Rate and Rhythm: Normal rate. Rhythm irregular.   Pulmonary:      Effort: Pulmonary effort is normal.      Breath sounds: Normal breath sounds.   Abdominal:      Comments: NG tube in place   Musculoskeletal:         General: Normal range of motion.   Skin:     General: Skin is warm and dry.   Neurological:      General: No focal deficit present.      Mental Status: She is alert and oriented to person, place, and time.   Psychiatric:         Mood and Affect: Mood normal.             Significant Labs: All pertinent  labs within the past 24 hours have been reviewed.    Significant Imaging: I have reviewed all pertinent imaging results/findings within the past 24 hours.

## 2024-01-31 NOTE — SUBJECTIVE & OBJECTIVE
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 ENDO;  Service: Endoscopy;  Laterality: N/A;    EYE SURGERY Bilateral     cataract removal with lens implant    HYSTERECTOMY      PERCUTANEOUS TRANSLUMINAL ANGIOPLASTY (PTA) OF PERIPHERAL VESSEL N/A 2/3/2020    Procedure: PTA, PERIPHERAL VESSEL;  Surgeon: Timmy Simmons MD;  Location: [facility] CATH LAB/EP;  Service: Cardiology;  Laterality: N/A;    PORTACATH PLACEMENT Right 09/2016    RENAL ARTERY STENT Left 07/19/2017    ROBOT-ASSISTED LAPAROSCOPIC REPAIR OF VENTRAL HERNIA N/A 3/23/2023    Procedure: ROBOTIC REPAIR, HERNIA, VENTRAL;  Surgeon: Chris Johnson MD;  Location: [facility] OR;  Service: General;  Laterality: N/A;  Robotic Parastomal hernia repair    SIGMOIDECTOMY  10/29/2019    SMALL INTESTINE SURGERY  08/23/2016    THROMBECTOMY N/A 2/3/2020    Procedure: THROMBECTOMY;  Surgeon: Timmy Simmons MD;  Location: [facility] CATH LAB/EP;  Service: Cardiology;  Laterality: N/A;    TONSILLECTOMY      VAGINAL HYSTERECTOMY W/ ANTERIOR AND POSTERIOR VAGINAL REPAIR         Review of patient's allergies indicates:   Allergen  Reactions    Adhesive Itching and Blisters    Penicillins Anaphylaxis    Tramadol Hives    Avelox [moxifloxacin] Rash     Facial and arm itching and redness. Pt states throat closes when given.    Amoxil [amoxicillin]     Aspridrox [aspirin, buffered]     Codeine Other (See Comments)     Throat swelling    Keflex [cephalexin]      Tolerated cefepime and cefazolin    Norvasc [amlodipine]     Red dye Hives    Robitussin [guaifenesin]     Sulfa (sulfonamide antibiotics)     Tylenol [acetaminophen]      Has reaction to Tylenol with red dye and unable to take Extra Strength Tylenol/ CAN ONLY TOLERATE REG STRENGTH TYLENOL    Vicks vaporub [camphor-eucalyptus oil-menthol]        Medications:  Medications Prior to Admission   Medication Sig    acetaminophen (TYLENOL) 325 MG tablet Take 2 tablets (650 mg total) by mouth every 4 (four) hours as needed for Pain.    apixaban (ELIQUIS) 2.5 mg Tab Take 1 tablet (2.5 mg total) by mouth 2 (two) times daily.    calcium-vitamin D 600 mg(1,500mg) -400 unit Tab Take 1 tablet by mouth once daily.    docusate sodium (COLACE) 100 MG capsule Take 1 capsule (100 mg total) by mouth 2 (two) times daily.    furosemide (LASIX) 20 MG tablet Take 20 mg by mouth once daily.    levothyroxine (SYNTHROID) 125 MCG tablet TAKE 1 TABLET (125 MCG TOTAL) BY MOUTH ONCE DAILY.    losartan (COZAAR) 25 MG tablet Take 25 mg by mouth once daily.    magnesium oxide (MAG-OX) 400 mg (241.3 mg magnesium) tablet Take 2 tablets (800 mg total) by mouth once daily.    metoprolol tartrate (LOPRESSOR) 25 MG tablet Take 25 mg by mouth 2 (two) times daily.    ondansetron (ZOFRAN) 4 MG tablet Take 4 mg by mouth every 6 (six) hours as needed for Nausea (vomiting).    OXcarbazepine (TRILEPTAL) 150 MG Tab Take 150 mg by mouth nightly.    pantoprazole (PROTONIX) 40 MG tablet Take 40 mg by mouth once daily.    PHENobarbitaL 97.2 MG tablet Take 97.2 mg by mouth every evening.    polyethylene glycol (GLYCOLAX) 17 gram PwPk Take 17  g by mouth once daily.    potassium chloride SA (K-DUR,KLOR-CON M) 10 MEQ tablet Take 1 tablet (10 mEq total) by mouth once daily.    pravastatin (PRAVACHOL) 20 MG tablet Take 20 mg by mouth every evening.    vitamin D (VITAMIN D3) 1000 units Tab Take 1,000 Units by mouth once daily.     Antibiotics (From admission, onward)      Start     Stop Route Frequency Ordered    01/28/24 2045  ceFEPIme (MAXIPIME) 1 g in dextrose 5 % in water (D5W) 100 mL IVPB (MB+)         -- IV Every 12 hours (non-standard times) 01/28/24 1941          Antifungals (From admission, onward)      None          Antivirals (From admission, onward)      None             Immunization History   Administered Date(s) Administered    COVID-19, mRNA, LNP-S, bivalent booster, PF (PFIZER OMICRON) 02/01/2023    Influenza 09/16/2014    Influenza - High Dose - PF (65 years and older) 01/18/2013, 10/04/2013, 09/28/2017, 10/10/2018, 10/18/2019    Influenza - Trivalent - PF (ADULT) 09/16/2014    PPD Test 03/15/2017, 11/07/2019    Pneumococcal Conjugate - 13 Valent 05/25/2016    Pneumococcal Polysaccharide - 23 Valent 08/26/2014, 10/10/2018    Tdap 08/20/2019    Zoster Recombinant 08/20/2019       Family History       Problem Relation (Age of Onset)    Heart attack Father    Hypertension Father    Pancreatic cancer Mother          Social History     Socioeconomic History    Marital status:    Tobacco Use    Smoking status: Never    Smokeless tobacco: Never   Substance and Sexual Activity    Alcohol use: No    Drug use: No    Sexual activity: Not Currently     Partners: Male     Social Determinants of Health     Financial Resource Strain: Low Risk  (1/29/2024)    Overall Financial Resource Strain (CARDIA)     Difficulty of Paying Living Expenses: Not hard at all   Food Insecurity: No Food Insecurity (1/29/2024)    Hunger Vital Sign     Worried About Running Out of Food in the Last Year: Never true     Ran Out of Food in the Last Year: Never true    Transportation Needs: No Transportation Needs (1/29/2024)    PRAPARE - Transportation     Lack of Transportation (Medical): No     Lack of Transportation (Non-Medical): No   Physical Activity: Inactive (1/29/2024)    Exercise Vital Sign     Days of Exercise per Week: 0 days     Minutes of Exercise per Session: 0 min   Stress: No Stress Concern Present (1/29/2024)    [employer] [place] [employer] - Occupational Stress Questionnaire     Feeling of Stress : Not at all   Social Connections: Socially Isolated (1/29/2024)    Social Connection and Isolation Panel [NHANES]     Frequency of Communication with Friends and Family: More than three times a week     Frequency of Social Gatherings with Friends and Family: More than three times a week     Attends Evangelical Services: Never     Active Member of Clubs or Organizations: No     Attends Club or Organization Meetings: Never     Marital Status:    Housing Stability: Low Risk  (1/29/2024)    Housing Stability Vital Sign     Unable to Pay for Housing in the Last Year: No     Number of Places Lived in the Last Year: 1     Unstable Housing in the Last Year: No     Review of Systems   Constitutional:  Negative for chills and fever.   Respiratory:  Negative for cough and shortness of breath.    Cardiovascular:  Negative for chest pain.   Gastrointestinal:  Negative for abdominal pain, diarrhea and nausea.   Genitourinary:  Negative for dysuria.   Neurological:  Negative for dizziness and headaches.   Psychiatric/Behavioral:  Negative for confusion.      Objective:     Vital Signs (Most Recent):  Temp: 97.4 °F (36.3 °C) (01/31/24 1112)  Pulse: 77 (01/31/24 1241)  Resp: 18 (01/31/24 1112)  BP: (!) 128/58 (01/31/24 1112)  SpO2: (!) 93 % (01/31/24 1112) Vital Signs (24h Range):  Temp:  [97.4 °F (36.3 °C)-98.4 °F (36.9 °C)] 97.4 °F (36.3 °C)  Pulse:  [] 77  Resp:  [18-22] 18  SpO2:  [93 %-99 %] 93 %  BP: (111-168)/(55-70) 128/58     Weight: 68.2 kg (150 lb  5.7 oz)  Body mass index is 32.54 kg/m².    Estimated Creatinine Clearance: 49.7 mL/min (based on SCr of 0.7 mg/dL).     Physical Exam  Constitutional:       Appearance: Normal appearance.   HENT:      Mouth/Throat:      Mouth: Mucous membranes are moist.      Pharynx: Oropharynx is clear.   Eyes:      Extraocular Movements: Extraocular movements intact.      Conjunctiva/sclera: Conjunctivae normal.   Cardiovascular:      Rate and Rhythm: Normal rate. Rhythm irregular.   Pulmonary:      Effort: Pulmonary effort is normal.      Breath sounds: Normal breath sounds.   Abdominal:      Comments: NG tube in place   Musculoskeletal:         General: Normal range of motion.   Skin:     General: Skin is warm and dry.   Neurological:      General: No focal deficit present.      Mental Status: She is alert and oriented to person, place, and time.   Psychiatric:         Mood and Affect: Mood normal.          Significant Labs: All pertinent labs within the past 24 hours have been reviewed.    Significant Imaging: I have reviewed all pertinent imaging results/findings within the past 24 hours.

## 2024-01-31 NOTE — NURSING TRANSFER
Nursing Transfer Note      1/31/2024   4:49 PM    Nurse giving handoff:jian best  Nurse receiving handoff:rn room 505    Reason patient is being transferred: post op    Transfer To: room 505    Transfer via bed    Transfer with cardiac monitoring, ng tube, colostomy    Transported by rn    Transfer Vital Signs:  Blood Pressure:see flowsheet  Heart Rate:  O2:  Temperature:  Respirations:    Telemetry:   Order for Tele Monitor?     Additional Lines:     4eyes on Skin: no    Medicines sent: no    Any special needs or follow-up needed: no    Patient belongings transferred with patient: Yes    Chart send with patient: Yes    Notified: family    Patient reassessed at:  (date, time)  1  Upon arrival to floor:

## 2024-01-31 NOTE — OP NOTE
[facility]  General Surgery  Operative Note    SUMMARY     Date of Procedure: 1/31/2024     Procedure: Procedure(s) (LRB):  ROBOTIC LAPAROSCOPY, EXPLORATORY (N/A)  ROBOTIC LYSIS, ADHESIONS (N/A)       Surgeon(s) and Role:     * Chris Johnson MD - Primary    Assisting Surgeon: None    Pre-Operative Diagnosis: Nausea and vomiting, unspecified vomiting type [R11.2]  Intractable vomiting [R11.10]  S/P exploratory laparotomy [Z98.890]  Adhesive partial Small bowel obstruction  Internal hernia    Post-Operative Diagnosis: same    Anesthesia: General    Operative Findings (including complications, if any):   Omental adhesions with loop of small bowel through omental defect    Description of Technical Procedures:   Brought into the OR and placed supine. A RUQ trocar was inserted using optical trocar. Two right sided trocars were placed and the robot was docked. There was immediately noted to be omental adhesions to the anterior abdominal wall. This appeared to create a defect in the omentum with a loop of small intestine herniated through. Adhesions were taken down using cautery and scissors starting at the mesh. Once this was done the omentum was divided where it created the defect. The small bowel was then run proximally to distally. No other areas of obstruction were noted. The bowel appeared viable and healthy. The previous small bowel anastomosis was widely patent. No mesenteric defect was noted there. The trocars were removed. Skin was closed using 4-0 monocryl.     Significant Surgical Tasks Conducted by the Assistant(s), if Applicable:     Estimated Blood Loss (EBL): 50ml           Implants: * No implants in log *    Specimens:   Specimen (24h ago, onward)      None                    Condition: Stable    Disposition: PACU - hemodynamically stable.    Attestation: I was present and scrubbed for the entire procedure.

## 2024-01-31 NOTE — ASSESSMENT & PLAN NOTE
Urine culture demonstrating multi-resistant Pseudomonas  Notable history of Acinetobacter, Enterobacter, and Pseudomonas UTI in the past    Plan:  IV cefepime  ID consult  May need Urology follow up given recurrent Pseudomonas infection

## 2024-01-31 NOTE — ASSESSMENT & PLAN NOTE
Patient has a current diagnosis of hypertensive urgency (without evidence of end organ damage) which is controlled.  Latest blood pressure and vitals reviewed-   Temp:  [97.4 °F (36.3 °C)-98.4 °F (36.9 °C)]   Pulse:  []   Resp:  [18-22]   BP: (111-168)/(55-70)   SpO2:  [93 %-99 %] .   Patient currently off IV antihypertensives.   Home meds for hypertension were reviewed and noted below.   Hypertension Medications               furosemide (LASIX) 20 MG tablet Take 20 mg by mouth once daily.    losartan (COZAAR) 25 MG tablet Take 25 mg by mouth once daily.    metoprolol tartrate (LOPRESSOR) 25 MG tablet Take 25 mg by mouth 2 (two) times daily.            Medication adjustment for hospital antihypertensives is as follows- IV labetalol     Will aim for controlled BP reduction by medications noted above. Monitor and mitigate end organ damage as indicated.    Resume home meds

## 2024-01-31 NOTE — TRANSFER OF CARE
"Anesthesia Transfer of Care Note    Patient: Annette Garcia    Procedure(s) Performed: Procedure(s) (LRB):  ROBOTIC LAPAROSCOPY, EXPLORATORY (N/A)  ROBOTIC LYSIS, ADHESIONS (N/A)    Patient location: PACU    Anesthesia Type: general    Transport from OR: Transported from OR on 6-10 L/min O2 by face mask with adequate spontaneous ventilation    Post pain: adequate analgesia    Post assessment: no apparent anesthetic complications    Post vital signs: stable    Level of consciousness: awake    Nausea/Vomiting: no nausea/vomiting    Complications: none    Transfer of care protocol was followed      Last vitals: Visit Vitals  BP (!) 128/58 (Patient Position: Lying)   Pulse 77   Temp 36.3 °C (97.4 °F) (Oral)   Resp 18   Ht 4' 9" (1.448 m)   Wt 68.2 kg (150 lb 5.7 oz)   LMP  (LMP Unknown)   SpO2 (!) 93%   Breastfeeding No   BMI 32.54 kg/m²     "

## 2024-01-31 NOTE — PROGRESS NOTES
[facility] Medicine  Progress Note    Patient Name: Annette Garcia  MRN: [id number]  Patient Class: IP- Inpatient   Admission Date: 1/28/2024  Length of Stay: 3 days  Attending Physician: Graham Kapadia MD  Primary Care Provider: [facility]        Subjective:     Principal Problem:Small bowel obstruction        HPI:  88 years old female with past medical history significant for HTN, seizure disorder, afib on eliquis, LLE DVT, May-Thurner Syndrome, SBO s/p colostomy, and osteoporosis, who came to ER because of vomiting and abdominal pain since yesterday.  Patient reported she had multiple episodes of nonbloody vomiting since yesterday.  Patient reported she has been having abdominal pain since yesterday which is localized to central abdomen, 6-7/10 in severity and nonradiating.  Patient reported she has history of small-bowel obstruction.  Patient also reported feeling nauseous.  Patient denied any other symptoms including chest pain shortness of breath diarrhea fever chills cough or dizziness. CT A&P showed ''distended loops of small bowel with a transition point in the mid abdomen, at an identical location to prior CT dated 12/15/2023.  Findings are concerning for partial small bowel obstruction. Postoperative changes of partial small and large bowel resection with a left lower quadrant colostomy. Small hiatal hernia with mild wall thickening of the distal esophageal wall and minimal paraesophageal fluid''. Chest xray negative for any acute abnormality. US showed UTI. Patient received IV fluid and general surgery has been consulted in ER.  In the ER, blood pressure was 200/93 mm Hg, patient received IV labetalol 10 mg, most recent blood pressure is 147/74 mm Hg.  I also spoke to patient's daughter at bedside.      Overview/Hospital Course:  1/29/274: surgery on board, Gastrografin challenge today    Interval History:   No acute events  Family at bedside  NPO  Denies pain  No  n/v/d  No complaints    Review of Systems   Constitutional:  Negative for chills and fever.   Respiratory:  Negative for cough and shortness of breath.    Cardiovascular:  Negative for chest pain.   Gastrointestinal:  Negative for abdominal pain, diarrhea and nausea.   Genitourinary:  Negative for dysuria.   Neurological:  Negative for dizziness and headaches.   Psychiatric/Behavioral:  Negative for confusion.      Objective:     Vital Signs (Most Recent):  Temp: 97.4 °F (36.3 °C) (01/31/24 1112)  Pulse: 70 (01/31/24 1112)  Resp: 18 (01/31/24 1112)  BP: (!) 128/58 (01/31/24 1112)  SpO2: (!) 93 % (01/31/24 1112) Vital Signs (24h Range):  Temp:  [97.4 °F (36.3 °C)-98.4 °F (36.9 °C)] 97.4 °F (36.3 °C)  Pulse:  [] 70  Resp:  [18-22] 18  SpO2:  [93 %-99 %] 93 %  BP: (111-168)/(55-70) 128/58     Weight: 68.2 kg (150 lb 5.7 oz)  Body mass index is 32.54 kg/m².    Intake/Output Summary (Last 24 hours) at 1/31/2024 1225  Last data filed at 1/31/2024 1100  Gross per 24 hour   Intake 240.77 ml   Output 1020 ml   Net -779.23 ml           Physical Exam  Constitutional:       Appearance: Normal appearance.   HENT:      Mouth/Throat:      Mouth: Mucous membranes are moist.      Pharynx: Oropharynx is clear.   Eyes:      Extraocular Movements: Extraocular movements intact.      Conjunctiva/sclera: Conjunctivae normal.   Cardiovascular:      Rate and Rhythm: Normal rate. Rhythm irregular.   Pulmonary:      Effort: Pulmonary effort is normal.      Breath sounds: Normal breath sounds.   Abdominal:      Comments: NG tube in place   Musculoskeletal:         General: Normal range of motion.   Skin:     General: Skin is warm and dry.   Neurological:      General: No focal deficit present.      Mental Status: She is alert and oriented to person, place, and time.   Psychiatric:         Mood and Affect: Mood normal.             Significant Labs: All pertinent labs within the past 24 hours have been reviewed.    Significant Imaging: I  have reviewed all pertinent imaging results/findings within the past 24 hours.    Assessment/Plan:      * Small bowel obstruction    NPO  NG tube placed and intact  prn zofran for nausea control   General surgery on board.  Plan for ex lap Today      Hypertensive urgency  Patient has a current diagnosis of hypertensive urgency (without evidence of end organ damage) which is controlled.  Latest blood pressure and vitals reviewed-   Temp:  [97.4 °F (36.3 °C)-98.4 °F (36.9 °C)]   Pulse:  []   Resp:  [18-22]   BP: (111-168)/(55-70)   SpO2:  [93 %-99 %] .   Patient currently off IV antihypertensives.   Home meds for hypertension were reviewed and noted below.   Hypertension Medications               furosemide (LASIX) 20 MG tablet Take 20 mg by mouth once daily.    losartan (COZAAR) 25 MG tablet Take 25 mg by mouth once daily.    metoprolol tartrate (LOPRESSOR) 25 MG tablet Take 25 mg by mouth 2 (two) times daily.            Medication adjustment for hospital antihypertensives is as follows- IV labetalol     Will aim for controlled BP reduction by medications noted above. Monitor and mitigate end organ damage as indicated.    Resume home meds    UTI (urinary tract infection)  Urine culture demonstrating multi-resistant Pseudomonas  Notable history of Acinetobacter, Enterobacter, and Pseudomonas UTI in the past    Plan:  IV cefepime  ID consult  May need Urology follow up given recurrent Pseudomonas infection        A-fib  Patient with Persistent (7 days or more) atrial fibrillation which is controlled currently with Beta Blocker. Patient is currently in sinus rhythm.[id number] Score: 4. Anticoagulation indicated. Anticoagulation done with home med eliquis . Holding eliquis per suregry request.    S/P partial resection of colon        Hypothyroidism  Continue home med levothyroxine      Seizure disorder  Resume home meds -trileptal, phenobarb        VTE Risk Mitigation (From admission, onward)           Ordered      IP VTE HIGH RISK PATIENT  Once         01/1937     Place sequential compression device  Until discontinued         01/1935                    Discharge Planning   JESSICA: 2/2/2024     Code Status: Full Code   Is the patient medically ready for discharge?:     Reason for patient still in hospital (select all that apply): Patient trending condition, Laboratory test, Treatment, and Consult recommendations  Discharge Plan A: Return to nursing home   Discharge Delays: None known at this time              Graham Kapadia MD  Department of Hospital Medicine   [facility]

## 2024-01-31 NOTE — HPI
88 years old female with past medical history significant for HTN, seizure disorder, afib on eliquis, LLE DVT, May-Thurner Syndrome, SBO s/p colostomy, and osteoporosis, who came to ER because of vomiting and abdominal pain since yesterday.  Patient reported she had multiple episodes of nonbloody vomiting since yesterday.  Patient reported she has been having abdominal pain since yesterday which is localized to central abdomen, 6-7/10 in severity and nonradiating.  Patient reported she has history of small-bowel obstruction.  Patient also reported feeling nauseous.  Patient denied any other symptoms including chest pain shortness of breath diarrhea fever chills cough or dizziness. CT A&P showed ''distended loops of small bowel with a transition point in the mid abdomen, at an identical location to prior CT dated 12/15/2023.  Findings are concerning for partial small bowel obstruction. Postoperative changes of partial small and large bowel resection with a left lower quadrant colostomy. Small hiatal hernia with mild wall thickening of the distal esophageal wall and minimal paraesophageal fluid''. Chest xray negative for any acute abnormality. UA showed UTI. Patient received IV fluid and general surgery has been consulted in ER.    Her urine culture grew pseudomonas and she has been started on cefepime. Per her family she has had 2 utis prior to this one in the last 5 weeks. They note that at her nursing home she often has to hold her urine for longer than she would like while waiting for someone to help her. Recent urine culture earlier this month with acinetobacter  
88 years old female with past medical history significant for HTN, seizure disorder, afib on eliquis, LLE DVT, May-Thurner Syndrome, SBO s/p colostomy, and osteoporosis, who came to ER because of vomiting and abdominal pain since yesterday.  Patient reported she had multiple episodes of nonbloody vomiting since yesterday.  Patient reported she has been having abdominal pain since yesterday which is localized to central abdomen, 6-7/10 in severity and nonradiating.  Patient reported she has history of small-bowel obstruction.  Patient also reported feeling nauseous.  Patient denied any other symptoms including chest pain shortness of breath diarrhea fever chills cough or dizziness. CT A&P showed ''distended loops of small bowel with a transition point in the mid abdomen, at an identical location to prior CT dated 12/15/2023.  Findings are concerning for partial small bowel obstruction. Postoperative changes of partial small and large bowel resection with a left lower quadrant colostomy. Small hiatal hernia with mild wall thickening of the distal esophageal wall and minimal paraesophageal fluid''. Chest xray negative for any acute abnormality. US showed UTI. Patient received IV fluid and general surgery has been consulted in ER.  In the ER, blood pressure was 200/93 mm Hg, patient received IV labetalol 10 mg, most recent blood pressure is 147/74 mm Hg.  I also spoke to patient's daughter at bedside.    
Annette Garcia is a 88 y.o. female with a history of a fib (on eliquis), CAD, HTN, seizure disorder, LLE DVT, May-Thurner Syndrome, osteoporosis, and remote history of colon resection w/ end colostomy c/b parastomal hernia requiring repair (most recently March 2023) who presents with abdominal pain associated with nausea and vomiting. States that she has also noticed decreased colostomy output over the last three days. In the Ed, the patient is afebrile and hypertensive but otherwise hemodynamically stable. An NGT was placed with minimal return. CT A/P w/ distended loops of small bowel and a transition point in the mid abdomen (also viewed on CT from 12/15/23) and mild wall thickening of the distal esophagus w/ small hiatal hernia. Patient was admitted to hospital medicine. General surgery consulted for small bowel obstruction.       
No

## 2024-01-31 NOTE — CONSULTS
[facility] Surg  Infectious Disease  Consult Note    Patient Name: Annette Garcia  MRN: [id number]  Admission Date: 1/28/2024  Hospital Length of Stay: 3 days  Attending Physician: Graham Kapadia MD  Primary Care Provider: [facility]     Isolation Status: No active isolations    Patient information was obtained from patient and past medical records.      Inpatient consult to Infectious Diseases  Consult performed by: Jonathon Cain MD  Consult ordered by: Graham Kapadia MD  Reason for consult: UTI        Assessment/Plan:     Renal/  UTI (urinary tract infection)  88 years old female with past medical history significant for HTN, seizure disorder, afib on eliquis, LLE DVT, May-Thurner Syndrome, SBO s/p colostomy, and osteoporosis who is admitted for SBO and found to have pseudomonas UTI.    -has had recent recurrent UTIs  -concern that this is due to her not being able to void frequently enough at nursing home  -consider urology consult or referral  -would treat with cefepime x 14 days total  -no need for ID follow up given short course  -ID will sign off        Thank you for your consult. I will sign off. Please contact us if you have any additional questions.    Jonathon Cain MD  Infectious Disease  [facility] - Med Surg    Subjective:     Principal Problem: Small bowel obstruction    HPI: 88 years old female with past medical history significant for HTN, seizure disorder, afib on eliquis, LLE DVT, May-Thurner Syndrome, SBO s/p colostomy, and osteoporosis, who came to ER because of vomiting and abdominal pain since yesterday.  Patient reported she had multiple episodes of nonbloody vomiting since yesterday.  Patient reported she has been having abdominal pain since yesterday which is localized to central abdomen, 6-7/10 in severity and nonradiating.  Patient reported she has history of small-bowel obstruction.  Patient also reported feeling nauseous.  Patient denied any other symptoms  including chest pain shortness of breath diarrhea fever chills cough or dizziness. CT A&P showed ''distended loops of small bowel with a transition point in the mid abdomen, at an identical location to prior CT dated 12/15/2023.  Findings are concerning for partial small bowel obstruction. Postoperative changes of partial small and large bowel resection with a left lower quadrant colostomy. Small hiatal hernia with mild wall thickening of the distal esophageal wall and minimal paraesophageal fluid''. Chest xray negative for any acute abnormality. UA showed UTI. Patient received IV fluid and general surgery has been consulted in ER.    Her urine culture grew pseudomonas and she has been started on cefepime. Per her family she has had 2 utis prior to this one in the last 5 weeks. They note that at her nursing home she often has to hold her urine for longer than she would like while waiting for someone to help her. 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       Past Surgical History:   Procedure Laterality Date    APPENDECTOMY      BACK SURGERY  1988    vertebral fracture    BACK SURGERY  02/2013    lumbar L2-5    CATARACT EXTRACTION, BILATERAL Bilateral     CHOLECYSTECTOMY      open    ESOPHAGOGASTRODUODENOSCOPY N/A 2/15/2023    Procedure: EGD (ESOPHAGOGASTRODUODENOSCOPY);  Surgeon: Keith Chavarria MD;  Location: [facility];  Service: Endoscopy;  Laterality: N/A;    EYE  SURGERY Bilateral     cataract removal with lens implant    HYSTERECTOMY      PERCUTANEOUS TRANSLUMINAL ANGIOPLASTY (PTA) OF PERIPHERAL VESSEL N/A 2/3/2020    Procedure: PTA, PERIPHERAL VESSEL;  Surgeon: Timmy Simmons MD;  Location: [facility] CATH LAB/EP;  Service: Cardiology;  Laterality: N/A;    PORTACATH PLACEMENT Right 09/2016    RENAL ARTERY STENT Left 07/19/2017    ROBOT-ASSISTED LAPAROSCOPIC REPAIR OF VENTRAL HERNIA N/A 3/23/2023    Procedure: ROBOTIC REPAIR, HERNIA, VENTRAL;  Surgeon: Chris Johnson MD;  Location: [facility] OR;  Service: General;  Laterality: N/A;  Robotic Parastomal hernia repair    SIGMOIDECTOMY  10/29/2019    SMALL INTESTINE SURGERY  08/23/2016    THROMBECTOMY N/A 2/3/2020    Procedure: THROMBECTOMY;  Surgeon: Timmy Simmons MD;  Location: [facility] CATH LAB/EP;  Service: Cardiology;  Laterality: N/A;    TONSILLECTOMY      VAGINAL HYSTERECTOMY W/ ANTERIOR AND POSTERIOR VAGINAL REPAIR         Review of patient's allergies indicates:   Allergen Reactions    Adhesive Itching and Blisters    Penicillins Anaphylaxis    Tramadol Hives    Avelox [moxifloxacin] Rash     Facial and arm itching and redness. Pt states throat closes when given.    Amoxil [amoxicillin]     Aspridrox [aspirin, buffered]     Codeine Other (See Comments)     Throat swelling    Keflex [cephalexin]      Tolerated cefepime and cefazolin    Norvasc [amlodipine]     Red dye Hives    Robitussin [guaifenesin]     Sulfa (sulfonamide antibiotics)     Tylenol [acetaminophen]      Has reaction to Tylenol with red dye and unable to take Extra Strength Tylenol/ CAN ONLY TOLERATE REG STRENGTH TYLENOL    Vicks vaporub [camphor-eucalyptus oil-menthol]        Medications:  Medications Prior to Admission   Medication Sig    acetaminophen (TYLENOL) 325 MG tablet Take 2 tablets (650 mg total) by mouth every 4 (four) hours as needed for Pain.    apixaban (ELIQUIS) 2.5 mg Tab Take 1 tablet (2.5 mg total) by mouth 2 (two) times daily.     calcium-vitamin D 600 mg(1,500mg) -400 unit Tab Take 1 tablet by mouth once daily.    docusate sodium (COLACE) 100 MG capsule Take 1 capsule (100 mg total) by mouth 2 (two) times daily.    furosemide (LASIX) 20 MG tablet Take 20 mg by mouth once daily.    levothyroxine (SYNTHROID) 125 MCG tablet TAKE 1 TABLET (125 MCG TOTAL) BY MOUTH ONCE DAILY.    losartan (COZAAR) 25 MG tablet Take 25 mg by mouth once daily.    magnesium oxide (MAG-OX) 400 mg (241.3 mg magnesium) tablet Take 2 tablets (800 mg total) by mouth once daily.    metoprolol tartrate (LOPRESSOR) 25 MG tablet Take 25 mg by mouth 2 (two) times daily.    ondansetron (ZOFRAN) 4 MG tablet Take 4 mg by mouth every 6 (six) hours as needed for Nausea (vomiting).    OXcarbazepine (TRILEPTAL) 150 MG Tab Take 150 mg by mouth nightly.    pantoprazole (PROTONIX) 40 MG tablet Take 40 mg by mouth once daily.    PHENobarbitaL 97.2 MG tablet Take 97.2 mg by mouth every evening.    polyethylene glycol (GLYCOLAX) 17 gram PwPk Take 17 g by mouth once daily.    potassium chloride SA (K-DUR,KLOR-CON M) 10 MEQ tablet Take 1 tablet (10 mEq total) by mouth once daily.    pravastatin (PRAVACHOL) 20 MG tablet Take 20 mg by mouth every evening.    vitamin D (VITAMIN D3) 1000 units Tab Take 1,000 Units by mouth once daily.     Antibiotics (From admission, onward)      Start     Stop Route Frequency Ordered    01/28/24 2045  ceFEPIme (MAXIPIME) 1 g in dextrose 5 % in water (D5W) 100 mL IVPB (MB+)         -- IV Every 12 hours (non-standard times) 01/28/24 1941          Antifungals (From admission, onward)      None          Antivirals (From admission, onward)      None             Immunization History   Administered Date(s) Administered    COVID-19, mRNA, LNP-S, bivalent booster, PF (PFIZER OMICRON) 02/01/2023    Influenza 09/16/2014    Influenza - High Dose - PF (65 years and older) 01/18/2013, 10/04/2013, 09/28/2017, 10/10/2018, 10/18/2019    Influenza - Trivalent - PF (ADULT)  09/16/2014    PPD Test 03/15/2017, 11/07/2019    Pneumococcal Conjugate - 13 Valent 05/25/2016    Pneumococcal Polysaccharide - 23 Valent 08/26/2014, 10/10/2018    Tdap 08/20/2019    Zoster Recombinant 08/20/2019       Family History       Problem Relation (Age of Onset)    Heart attack Father    Hypertension Father    Pancreatic cancer Mother          Social History     Socioeconomic History    Marital status:    Tobacco Use    Smoking status: Never    Smokeless tobacco: Never   Substance and Sexual Activity    Alcohol use: No    Drug use: No    Sexual activity: Not Currently     Partners: Male     Social Determinants of Health     Financial Resource Strain: Low Risk  (1/29/2024)    Overall Financial Resource Strain (CARDIA)     Difficulty of Paying Living Expenses: Not hard at all   Food Insecurity: No Food Insecurity (1/29/2024)    Hunger Vital Sign     Worried About Running Out of Food in the Last Year: Never true     Ran Out of Food in the Last Year: Never true   Transportation Needs: No Transportation Needs (1/29/2024)    PRAPARE - Transportation     Lack of Transportation (Medical): No     Lack of Transportation (Non-Medical): No   Physical Activity: Inactive (1/29/2024)    Exercise Vital Sign     Days of Exercise per Week: 0 days     Minutes of Exercise per Session: 0 min   Stress: No Stress Concern Present (1/29/2024)    [employer] - Occupational Stress Questionnaire     Feeling of Stress : Not at all   Social Connections: Socially Isolated (1/29/2024)    Social Connection and Isolation Panel [NHANES]     Frequency of Communication with Friends and Family: More than three times a week     Frequency of Social Gatherings with Friends and Family: More than three times a week     Attends Restorationist Services: Never     Active Member of Clubs or Organizations: No     Attends Club or Organization Meetings: Never     Marital Status:    Housing Stability: Low Risk   (1/29/2024)    Housing Stability Vital Sign     Unable to Pay for Housing in the Last Year: No     Number of Places Lived in the Last Year: 1     Unstable Housing in the Last Year: No     Review of Systems   Constitutional:  Negative for chills and fever.   Respiratory:  Negative for cough and shortness of breath.    Cardiovascular:  Negative for chest pain.   Gastrointestinal:  Negative for abdominal pain, diarrhea and nausea.   Genitourinary:  Negative for dysuria.   Neurological:  Negative for dizziness and headaches.   Psychiatric/Behavioral:  Negative for confusion.      Objective:     Vital Signs (Most Recent):  Temp: 97.4 °F (36.3 °C) (01/31/24 1112)  Pulse: 77 (01/31/24 1241)  Resp: 18 (01/31/24 1112)  BP: (!) 128/58 (01/31/24 1112)  SpO2: (!) 93 % (01/31/24 1112) Vital Signs (24h Range):  Temp:  [97.4 °F (36.3 °C)-98.4 °F (36.9 °C)] 97.4 °F (36.3 °C)  Pulse:  [] 77  Resp:  [18-22] 18  SpO2:  [93 %-99 %] 93 %  BP: (111-168)/(55-70) 128/58     Weight: 68.2 kg (150 lb 5.7 oz)  Body mass index is 32.54 kg/m².    Estimated Creatinine Clearance: 49.7 mL/min (based on SCr of 0.7 mg/dL).     Physical Exam  Constitutional:       Appearance: Normal appearance.   HENT:      Mouth/Throat:      Mouth: Mucous membranes are moist.      Pharynx: Oropharynx is clear.   Eyes:      Extraocular Movements: Extraocular movements intact.      Conjunctiva/sclera: Conjunctivae normal.   Cardiovascular:      Rate and Rhythm: Normal rate. Rhythm irregular.   Pulmonary:      Effort: Pulmonary effort is normal.      Breath sounds: Normal breath sounds.   Abdominal:      Comments: NG tube in place   Musculoskeletal:         General: Normal range of motion.   Skin:     General: Skin is warm and dry.   Neurological:      General: No focal deficit present.      Mental Status: She is alert and oriented to person, place, and time.   Psychiatric:         Mood and Affect: Mood normal.          Significant Labs: All pertinent labs within  the past 24 hours have been reviewed.    Significant Imaging: I have reviewed all pertinent imaging results/findings within the past 24 hours.

## 2024-01-31 NOTE — ANESTHESIA PREPROCEDURE EVALUATION
[facility]  Anesthesia Pre-Operative Evaluation         Patient Name: Annette Garcia  YOB: 1935  MRN: [id number]    SUBJECTIVE:     01/31/2024    Procedure(s) (LRB):  ROBOTIC LAPAROSCOPY, EXPLORATORY (N/A)    Annette Garcia is a 88 y.o. female here for Procedure(s) (LRB):  ROBOTIC LAPAROSCOPY, EXPLORATORY (N/A)    Drips:     Patient Active Problem List   Diagnosis    Renovascular hypertension    Seizure disorder    Hypothyroidism    Osteoarthritis of lumbar spine    Degenerative joint disease of sacroiliac joint    S/P exploratory laparotomy    DLBCL (diffuse large B cell lymphoma)    Hypertension    Anemia due to antineoplastic chemotherapy    Bilateral renal artery stenosis    Hyponatremia    Closed comminuted fracture of right humerus    Closed displaced fracture of distal phalanx of right little finger    Age-related osteoporosis with current pathological fracture with routine healing    Closed wedge compression fracture of eleventh thoracic vertebra with routine healing    DDD (degenerative disc disease), lumbar    Chronic bilateral low back pain without sciatica    Age-related osteoporosis without current pathological fracture    Vitamin D deficiency    Subjective memory complaints    Depression    Old MI (myocardial infarction)    Peripheral vascular disease, unspecified    Slow transit constipation    Abdominal pain    Sinus pause    Anemia of acute infection    Urinary retention    Hoarseness    Paralysis of right vocal cord    Hypomagnesemia    Palliative care encounter    Counseling regarding advance care planning and goals of care    Advance care planning    S/P partial resection of colon    A-fib    Acute deep vein thrombosis (DVT) of femoral vein of left lower extremity    Nursing home resident    Bilateral leg edema    May-Thurner syndrome    Syncope    Hypotensive  episode    Coronary artery disease    Chronic anemia    DVT of deep femoral vein, left    Chronic anticoagulation    Colitis    Colostomy present    Partial small bowel obstruction    Leukocytosis    Small bowel obstruction    h/o DVT (deep venous thrombosis)    Chronic deep vein thrombosis (DVT) of left lower extremity    DNR (do not resuscitate)    Major depressive disorder, recurrent, unspecified    Intractable vomiting    Parastomal hernia with obstruction and without gangrene    GI bleed    UTI (urinary tract infection)    Hypertensive urgency       Review of patient's allergies indicates:   Allergen Reactions    Adhesive Itching and Blisters    Penicillins Anaphylaxis    Tramadol Hives    Avelox [moxifloxacin] Rash     Facial and arm itching and redness. Pt states throat closes when given.    Amoxil [amoxicillin]     Aspridrox [aspirin, buffered]     Codeine Other (See Comments)     Throat swelling    Keflex [cephalexin]      Tolerated cefepime and cefazolin    Norvasc [amlodipine]     Red dye Hives    Robitussin [guaifenesin]     Sulfa (sulfonamide antibiotics)     Tylenol [acetaminophen]      Has reaction to Tylenol with red dye and unable to take Extra Strength Tylenol/ CAN ONLY TOLERATE REG STRENGTH TYLENOL    Vicks vaporub [camphor-eucalyptus oil-menthol]        No current facility-administered medications on file prior to encounter.     Current Outpatient Medications on File Prior to Encounter   Medication Sig Dispense Refill    acetaminophen (TYLENOL) 325 MG tablet Take 2 tablets (650 mg total) by mouth every 4 (four) hours as needed for Pain.  0    apixaban (ELIQUIS) 2.5 mg Tab Take 1 tablet (2.5 mg total) by mouth 2 (two) times daily.      calcium-vitamin D 600 mg(1,500mg) -400 unit Tab Take 1 tablet by mouth once daily.      docusate sodium (COLACE) 100 MG capsule Take 1 capsule (100 mg total) by mouth 2 (two) times daily.  0    furosemide (LASIX) 20 MG tablet Take 20 mg by mouth once daily.       levothyroxine (SYNTHROID) 125 MCG tablet TAKE 1 TABLET (125 MCG TOTAL) BY MOUTH ONCE DAILY. 90 tablet 3    losartan (COZAAR) 25 MG tablet Take 25 mg by mouth once daily.      magnesium oxide (MAG-OX) 400 mg (241.3 mg magnesium) tablet Take 2 tablets (800 mg total) by mouth once daily.  0    metoprolol tartrate (LOPRESSOR) 25 MG tablet Take 25 mg by mouth 2 (two) times daily.      ondansetron (ZOFRAN) 4 MG tablet Take 4 mg by mouth every 6 (six) hours as needed for Nausea (vomiting).      OXcarbazepine (TRILEPTAL) 150 MG Tab Take 150 mg by mouth nightly.      pantoprazole (PROTONIX) 40 MG tablet Take 40 mg by mouth once daily.      PHENobarbitaL 97.2 MG tablet Take 97.2 mg by mouth every evening.      polyethylene glycol (GLYCOLAX) 17 gram PwPk Take 17 g by mouth once daily. 30 packet 5    potassium chloride SA (K-DUR,KLOR-CON M) 10 MEQ tablet Take 1 tablet (10 mEq total) by mouth once daily. 120 tablet 0    pravastatin (PRAVACHOL) 20 MG tablet Take 20 mg by mouth every evening.      vitamin D (VITAMIN D3) 1000 units Tab Take 1,000 Units by mouth once daily.         Past Surgical History:   Procedure Laterality Date    APPENDECTOMY      BACK SURGERY  1988    vertebral fracture    BACK SURGERY  02/2013    lumbar L2-5    CATARACT EXTRACTION, BILATERAL Bilateral     CHOLECYSTECTOMY      open    ESOPHAGOGASTRODUODENOSCOPY N/A 2/15/2023    Procedure: EGD (ESOPHAGOGASTRODUODENOSCOPY);  Surgeon: Keith Chavarria MD;  Location: [facility] ENDO;  Service: Endoscopy;  Laterality: N/A;    EYE SURGERY Bilateral     cataract removal with lens implant    HYSTERECTOMY      PERCUTANEOUS TRANSLUMINAL ANGIOPLASTY (PTA) OF PERIPHERAL VESSEL N/A 2/3/2020    Procedure: PTA, PERIPHERAL VESSEL;  Surgeon: Timmy Simmons MD;  Location: [facility] CATH LAB/EP;  Service: Cardiology;  Laterality: N/A;    PORTACATH PLACEMENT Right 09/2016    RENAL ARTERY STENT Left 07/19/2017    ROBOT-ASSISTED LAPAROSCOPIC REPAIR OF VENTRAL HERNIA N/A 3/23/2023     Procedure: ROBOTIC REPAIR, HERNIA, VENTRAL;  Surgeon: Chris Johnson MD;  Location: [facility] OR;  Service: General;  Laterality: N/A;  Robotic Parastomal hernia repair    SIGMOIDECTOMY  10/29/2019    SMALL INTESTINE SURGERY  08/23/2016    THROMBECTOMY N/A 2/3/2020    Procedure: THROMBECTOMY;  Surgeon: Timmy Simmons MD;  Location: [facility] CATH LAB/EP;  Service: Cardiology;  Laterality: N/A;    TONSILLECTOMY      VAGINAL HYSTERECTOMY W/ ANTERIOR AND POSTERIOR VAGINAL REPAIR         Social History     Socioeconomic History    Marital status:    Tobacco Use    Smoking status: Never    Smokeless tobacco: Never   Substance and Sexual Activity    Alcohol use: No    Drug use: No    Sexual activity: Not Currently     Partners: Male     Social Determinants of Health     Financial Resource Strain: Low Risk  (1/29/2024)    Overall Financial Resource Strain (CARDIA)     Difficulty of Paying Living Expenses: Not hard at all   Food Insecurity: No Food Insecurity (1/29/2024)    Hunger Vital Sign     Worried About Running Out of Food in the Last Year: Never true     Ran Out of Food in the Last Year: Never true   Transportation Needs: No Transportation Needs (1/29/2024)    PRAPARE - Transportation     Lack of Transportation (Medical): No     Lack of Transportation (Non-Medical): No   Physical Activity: Inactive (1/29/2024)    Exercise Vital Sign     Days of Exercise per Week: 0 days     Minutes of Exercise per Session: 0 min   Stress: No Stress Concern Present (1/29/2024)    [employer] - Occupational Stress Questionnaire     Feeling of Stress : Not at all   Social Connections: Socially Isolated (1/29/2024)    Social Connection and Isolation Panel [NHANES]     Frequency of Communication with Friends and Family: More than three times a week     Frequency of Social Gatherings with Friends and Family: More than three times a week     Attends Hindu Services: Never     Active Member of Clubs or  Organizations: No     Attends Club or Organization Meetings: Never     Marital Status:    Housing Stability: Low Risk  (1/29/2024)    Housing Stability Vital Sign     Unable to Pay for Housing in the Last Year: No     Number of Places Lived in the Last Year: 1     Unstable Housing in the Last Year: No         OBJECTIVE:     Vital Signs Range (Last 24H):  Temp:  [36.4 °C (97.6 °F)-36.9 °C (98.4 °F)] 36.4 °C (97.6 °F)  Pulse:  [] 71  Resp:  [17-22] 18  SpO2:  [95 %-99 %] 99 %  BP: (111-168)/(55-70) 121/56    Significant Labs:  Lab Results   Component Value Date    WBC 6.40 01/31/2024    HGB 10.1 (L) 01/31/2024    HCT 30.4 (L) 01/31/2024     01/31/2024    CHOL 220 (H) 05/09/2017    TRIG 156 (H) 05/09/2017    HDL 64 05/09/2017    ALT 15 01/29/2024    AST 14 01/29/2024     01/31/2024    K 3.7 01/31/2024    CL 99 01/31/2024    CREATININE 0.7 01/31/2024    BUN 19 01/31/2024    CO2 26 01/31/2024    TSH 0.379 (L) 01/28/2024    INR 1.0 01/28/2024       Diagnostic Studies:    EKG:   Results for orders placed or performed during the hospital encounter of 01/28/24   EKG 12-lead    Collection Time: 01/28/24  2:08 PM    Narrative    Test Reason : I10,    Vent. Rate : 095 BPM     Atrial Rate : 095 BPM     P-R Int : 140 ms          QRS Dur : 084 ms      QT Int : 348 ms       P-R-T Axes : 023 013 063 degrees     QTc Int : 437 ms    Normal sinus rhythm with sinus arrhythmia  Normal ECG  When compared with ECG of 01-JAN-2024 11:48,  No significant change was found    Referred By: [email or web address]   SELF           Confirmed By:        2D ECHO:  TTE:  No results found for this or any previous visit.  Results for orders placed or performed during the hospital encounter of 12/15/23   Echo   Result Value Ref Range    RA Width 3.39 cm    LA volume (mod) 34.19 cm3    Left Atrium Major Axis 4.98 cm    Left Atrium Minor Axis 3.82 cm    RA Major Axis 4.05 cm    LV Diastolic Volume 71.82 mL    LV Systolic Volume 27.41 mL    MV  Peak A Gennaro 0.86 m/s    MV VTI 34.2 cm    MV Peak E Gennaro 0.92 m/s    MV peak gradient 6 mmHg    Mr max gennaro 5.27 m/s    Ao VTI 38.50 cm    Ao peak gennaro 1.59 m/s    LVOT peak VTI 18.30 cm    LVOT peak gennaro 0.97 m/s    LVOT diameter 2.0 cm    E wave deceleration time 267.18 msec    MV mean gradient 2 mmHg    AV mean gradient 5 mmHg    RV S' 17.91 cm/s    TAPSE 1.80 cm    RVDD 1.81 cm    LA size 3.29 cm    Ascending aorta 3.02 cm    STJ 2.78 cm    Sinus 3.68 cm    LVIDs 2.72 2.1 - 4.0 cm    Posterior Wall 1.07 0.6 - 1.1 cm    IVS 1.03 0.6 - 1.1 cm    LVIDd 4.04 3.5 - 6.0 cm    TDI LATERAL 0.07 m/s    Left Ventricular Outflow Tract Mean Gradient 1.95 mmHg    Left Ventricular Outflow Tract Mean Velocity 0.65 cm/s    Irizarry's Biplane MOD Ejection Fraction 65 %    IVC diameter 1.71 cm    TDI SEPTAL 0.07 m/s    LV LATERAL E/E' RATIO 13.14 m/s    LV SEPTAL E/E' RATIO 13.14 m/s    FS 33 28 - 44 %    LV mass 138.33 g    ZLVIDD -1.13     ZLVIDS -0.25     Left Ventricle Relative Wall Thickness 0.53 cm    AV valve area 1.49 cm²    AV Velocity Ratio 0.61     AV index (prosthetic) 0.48     MV valve area by continuity eq 1.68 cm2    E/A ratio 1.07     Mean e' 0.07 m/s    LVOT area 3.1 cm2    LVOT stroke volume 57.46 cm3    AV peak gradient 10 mmHg    E/E' ratio 13.14 m/s    LV Systolic Volume Index 17.3 mL/m2    LV Diastolic Volume Index 45.46 mL/m2    LV Mass Index 88 g/m2    LA Volume Index (Mod) 21.6 mL/m2    JIMBO by Velocity Ratio 1.92 cm²    BSA 1.62 m2    LA Volume Index 23.7 mL/m2    LA volume 37.48 cm3    LA WIDTH 3.1 cm    Est. RA pres 3 mmHg    Narrative      Left Ventricle: The left ventricle is normal in size. There is   concentric remodeling. Normal wall motion. There is normal systolic   function. Biplane (2D) method of discs ejection fraction is 65%. There is   normal diastolic function.    Right Ventricle: Normal right ventricular cavity size. Systolic   function is normal. TAPSE is 1.80 cm.    Aortic Valve: There is mild  to moderate stenosis. Aortic valve area by   VTI is 1.49 cm². Aortic valve peak velocity is 1.59 m/s. Mean gradient is   5 mmHg. The dimensionless index is 0.48.    Mitral Valve: There is no stenosis. The mean pressure gradient across   the mitral valve is 2 mmHg at a heart rate of  bpm. There is mild   regurgitation.    IVC/SVC: Normal venous pressure at 3 mmHg.    : There is no pericardial effusion.         3/23/2023 11:29 AM    Induction:  Rapid sequence induction    Intubated:  Postinduction    Attempts:  1    Method of Intubation:  Video laryngoscopy    Blade:  [place] 3    Laryngeal View Grade: Grade I - full view of cords      Airway Device Size:  7.0    Tube secured:  21    Secured at:  The lips      Pre-op Assessment    I have reviewed the Patient Summary Reports.     I have reviewed the Nursing Notes. I have reviewed the NPO Status.   I have reviewed the Medications.     Review of Systems  Anesthesia Hx:   History of prior surgery of interest to airway management or planning:            Denies Personal Hx of Anesthesia complications.                    Cardiovascular:     Hypertension  Past MI CAD                                        Renal/:  Chronic Renal Disease                Hepatic/GI:        Partial small bowel obstruction           Endocrine:   Hypothyroidism        Obesity / BMI > 30      Physical Exam    Airway:  Mallampati: II   Mouth Opening: Normal  TM Distance: Normal  Neck ROM: Normal ROM        Anesthesia Plan  Type of Anesthesia, risks & benefits discussed:    Anesthesia Type: Gen ETT  Intra-op Monitoring Plan: Standard ASA Monitors  Post Op Pain Control Plan: multimodal analgesia and IV/PO Opioids PRN  Induction:  IV and rapid sequence  Airway Plan: Video, Post-Induction  Informed Consent: Informed consent signed with the Patient and all parties understand the risks and agree with anesthesia plan.  All questions answered.   ASA Score: 3  Day of Surgery Review of History & Physical: H&P  Update referred to the surgeon/provider.  Anesthesia Plan Notes:     Chart review only      Ready For Surgery From Anesthesia Perspective.     .

## 2024-01-31 NOTE — ASSESSMENT & PLAN NOTE
NPO  NG tube placed and intact  prn zofran for nausea control   General surgery on board.  Plan for ex lap Today

## 2024-01-31 NOTE — ANESTHESIA POSTPROCEDURE EVALUATION
Anesthesia Post Evaluation    Patient: Annette Garcia    Procedure(s) Performed: Procedure(s) (LRB):  ROBOTIC LAPAROSCOPY, EXPLORATORY (N/A)  ROBOTIC LYSIS, ADHESIONS (N/A)    Final Anesthesia Type: general      Patient location during evaluation: PACU  Patient participation: Yes- Able to Participate  Level of consciousness: awake and alert  Post-procedure vital signs: reviewed and stable  Pain management: adequate  Airway patency: patent    PONV status at discharge: No PONV  Anesthetic complications: no      Cardiovascular status: blood pressure returned to baseline and hemodynamically stable  Respiratory status: unassisted  Hydration status: euvolemic  Follow-up not needed.              Vitals Value Taken Time   /70 01/31/24 1705   Temp 36.6 °C (97.8 °F) 01/31/24 1705   Pulse 73 01/31/24 1705   Resp 14 01/31/24 1705   SpO2 96 % 01/31/24 1705         Event Time   Out of Recovery 01/31/2024 17:01:06         Pain/Emerald Score: Emerald Score: 10 (1/31/2024  4:48 PM)

## 2024-01-31 NOTE — PROGRESS NOTES
[facility] Surg  General Surgery  Progress Note    Subjective:     Interval History:   Feeling well  No pain  No NV  Ng in place with about 500 out      Post-Op Info:  Procedure(s) (LRB):  ROBOTIC LAPAROSCOPY, EXPLORATORY (N/A)   Day of Surgery      Medications:  Continuous Infusions:  Scheduled Meds:   ceFEPime IV (PEDS and ADULTS)  1 g Intravenous Q12H    levothyroxine  125 mcg Oral Daily    losartan  25 mg Oral Daily    metoprolol tartrate  25 mg Oral BID    OXcarbazepine  150 mg Oral Nightly    pantoprazole  40 mg Oral Daily    PHENobarbitaL  97.2 mg Oral QHS    pravastatin  20 mg Oral QHS     PRN Meds:sodium chloride 0.9%, dextrose 10%, dextrose 10%, dextrose 10%, dextrose 10%, glucagon (human recombinant), glucagon (human recombinant), glucose, glucose, influenza 65up-adj, naloxone, ondansetron, sodium chloride 0.9%     Objective:     Vital Signs (Most Recent):  Temp: 97.4 °F (36.3 °C) (01/31/24 1112)  Pulse: 77 (01/31/24 1241)  Resp: 18 (01/31/24 1112)  BP: (!) 128/58 (01/31/24 1112)  SpO2: (!) 93 % (01/31/24 1112) Vital Signs (24h Range):  Temp:  [97.4 °F (36.3 °C)-98.4 °F (36.9 °C)] 97.4 °F (36.3 °C)  Pulse:  [] 77  Resp:  [18-22] 18  SpO2:  [93 %-99 %] 93 %  BP: (111-168)/(55-70) 128/58       Intake/Output Summary (Last 24 hours) at 1/31/2024 1300  Last data filed at 1/31/2024 1100  Gross per 24 hour   Intake 240.77 ml   Output 1020 ml   Net -779.23 ml       Physical Exam  Ab soft  Ostomy with stool output    Significant Labs:  CBC:   Recent Labs   Lab 01/31/24  0542   WBC 6.40   RBC 3.06*   HGB 10.1*   HCT 30.4*      MCV 99*   MCH 33.0*   MCHC 33.2     CMP:   Recent Labs   Lab 01/31/24  0542   GLU 84   CALCIUM 9.0      K 3.7   CO2 26   CL 99   BUN 19   CREATININE 0.7       Significant Diagnostics:  None    Assessment/Plan:     Active Diagnoses:    Diagnosis Date Noted POA    PRINCIPAL PROBLEM:  Small bowel obstruction [K56.609] 08/30/2022 Yes    UTI (urinary tract infection) [N39.0]  01/28/2024 Yes    Hypertensive urgency [I16.0] 01/28/2024 Yes    A-fib [I48.91] 12/28/2019 Yes    S/P partial resection of colon [Z90.49] 12/06/2019 Not Applicable    Hypothyroidism [E03.9] 08/18/2016 Yes    Seizure disorder [G40.909] 08/18/2016 Yes      Problems Resolved During this Admission:     88F with recurrent intermittent pSBO, likely from adhesions  NPO  Off oac  TO OR later today for dx lap      Chris Johnson MD  General Surgery  [place] - Med Surg

## 2024-01-31 NOTE — ASSESSMENT & PLAN NOTE
88 years old female with past medical history significant for HTN, seizure disorder, afib on eliquis, LLE DVT, May-Thurner Syndrome, SBO s/p colostomy, and osteoporosis who is admitted for SBO and found to have pseudomonas UTI.    -has had recent recurrent UTIs  -concern that this is due to her not being able to void frequently enough at nursing home  -consider urology consult or referral  -would treat with cefepime x 14 days total  -no need for ID follow up given short course  -ID will sign off

## 2024-02-01 LAB
ANION GAP SERPL CALC-SCNC: 15 MMOL/L (ref 8–16)
BASOPHILS # BLD AUTO: 0.01 K/UL (ref 0–0.2)
BASOPHILS NFR BLD: 0.2 % (ref 0–1.9)
BUN SERPL-MCNC: 25 MG/DL (ref 8–23)
CALCIUM SERPL-MCNC: 8.8 MG/DL (ref 8.7–10.5)
CHLORIDE SERPL-SCNC: 98 MMOL/L (ref 95–110)
CO2 SERPL-SCNC: 23 MMOL/L (ref 23–29)
CREAT SERPL-MCNC: 0.8 MG/DL (ref 0.5–1.4)
DIFFERENTIAL METHOD BLD: ABNORMAL
EOSINOPHIL # BLD AUTO: 0.1 K/UL (ref 0–0.5)
EOSINOPHIL NFR BLD: 0.9 % (ref 0–8)
ERYTHROCYTE [DISTWIDTH] IN BLOOD BY AUTOMATED COUNT: 13.6 % (ref 11.5–14.5)
EST. GFR  (NO RACE VARIABLE): >60 ML/MIN/1.73 M^2
GLUCOSE SERPL-MCNC: 78 MG/DL (ref 70–110)
HCT VFR BLD AUTO: 29.1 % (ref 37–48.5)
HGB BLD-MCNC: 9.8 G/DL (ref 12–16)
IMM GRANULOCYTES # BLD AUTO: 0.02 K/UL (ref 0–0.04)
IMM GRANULOCYTES NFR BLD AUTO: 0.3 % (ref 0–0.5)
LYMPHOCYTES # BLD AUTO: 1.4 K/UL (ref 1–4.8)
LYMPHOCYTES NFR BLD: 22.2 % (ref 18–48)
MCH RBC QN AUTO: 33.3 PG (ref 27–31)
MCHC RBC AUTO-ENTMCNC: 33.7 G/DL (ref 32–36)
MCV RBC AUTO: 99 FL (ref 82–98)
MONOCYTES # BLD AUTO: 0.7 K/UL (ref 0.3–1)
MONOCYTES NFR BLD: 10.9 % (ref 4–15)
NEUTROPHILS # BLD AUTO: 4.3 K/UL (ref 1.8–7.7)
NEUTROPHILS NFR BLD: 65.5 % (ref 38–73)
NRBC BLD-RTO: 0 /100 WBC
PLATELET # BLD AUTO: 177 K/UL (ref 150–450)
PMV BLD AUTO: 9.9 FL (ref 9.2–12.9)
POCT GLUCOSE: 111 MG/DL (ref 70–110)
POCT GLUCOSE: 130 MG/DL (ref 70–110)
POCT GLUCOSE: 73 MG/DL (ref 70–110)
POTASSIUM SERPL-SCNC: 3.9 MMOL/L (ref 3.5–5.1)
RBC # BLD AUTO: 2.94 M/UL (ref 4–5.4)
SODIUM SERPL-SCNC: 136 MMOL/L (ref 136–145)
WBC # BLD AUTO: 6.5 K/UL (ref 3.9–12.7)

## 2024-02-01 PROCEDURE — 25000003 PHARM REV CODE 250: Performed by: HOSPITALIST

## 2024-02-01 PROCEDURE — 25000003 PHARM REV CODE 250: Performed by: INTERNAL MEDICINE

## 2024-02-01 PROCEDURE — 80048 BASIC METABOLIC PNL TOTAL CA: CPT | Performed by: HOSPITALIST

## 2024-02-01 PROCEDURE — 21400001 HC TELEMETRY ROOM

## 2024-02-01 PROCEDURE — 94761 N-INVAS EAR/PLS OXIMETRY MLT: CPT

## 2024-02-01 PROCEDURE — 97530 THERAPEUTIC ACTIVITIES: CPT

## 2024-02-01 PROCEDURE — 36415 COLL VENOUS BLD VENIPUNCTURE: CPT | Performed by: HOSPITALIST

## 2024-02-01 PROCEDURE — 85025 COMPLETE CBC W/AUTO DIFF WBC: CPT | Performed by: HOSPITALIST

## 2024-02-01 PROCEDURE — 97162 PT EVAL MOD COMPLEX 30 MIN: CPT

## 2024-02-01 PROCEDURE — 99900035 HC TECH TIME PER 15 MIN (STAT)

## 2024-02-01 PROCEDURE — 63600175 PHARM REV CODE 636 W HCPCS: Performed by: INTERNAL MEDICINE

## 2024-02-01 RX ADMIN — CEFEPIME 1 G: 1 INJECTION, POWDER, FOR SOLUTION INTRAMUSCULAR; INTRAVENOUS at 10:02

## 2024-02-01 RX ADMIN — METOPROLOL TARTRATE 25 MG: 25 TABLET, FILM COATED ORAL at 09:02

## 2024-02-01 RX ADMIN — PRAVASTATIN SODIUM 20 MG: 10 TABLET ORAL at 08:02

## 2024-02-01 RX ADMIN — LOSARTAN POTASSIUM 25 MG: 25 TABLET, FILM COATED ORAL at 09:02

## 2024-02-01 RX ADMIN — METOPROLOL TARTRATE 25 MG: 25 TABLET, FILM COATED ORAL at 08:02

## 2024-02-01 RX ADMIN — OXCARBAZEPINE 150 MG: 150 TABLET, FILM COATED ORAL at 08:02

## 2024-02-01 RX ADMIN — LEVOTHYROXINE SODIUM 125 MCG: 25 TABLET ORAL at 09:02

## 2024-02-01 RX ADMIN — PHENOBARBITAL 97.2 MG: 32.4 TABLET ORAL at 08:02

## 2024-02-01 RX ADMIN — PANTOPRAZOLE SODIUM 40 MG: 40 TABLET, DELAYED RELEASE ORAL at 09:02

## 2024-02-01 RX ADMIN — CEFEPIME 1 G: 1 INJECTION, POWDER, FOR SOLUTION INTRAMUSCULAR; INTRAVENOUS at 11:02

## 2024-02-01 NOTE — ASSESSMENT & PLAN NOTE
Patient with Persistent (7 days or more) atrial fibrillation which is controlled currently with Beta Blocker. Patient is currently in sinus rhythm.[id number] Score: 4. Anticoagulation indicated. Anticoagulation done with home med eliquis . Holding eliquis per suregry request.

## 2024-02-01 NOTE — PROGRESS NOTES
"[facility] - Med Surg  Adult Nutrition  Progress Note    SUMMARY     Recommendations  Recommendation:   1. When medically acceptable, advance diet as tolerated to a regular diet   2. Monitor need for ONS   3. Monitor weight and labs    Goals: Pt will consume 50-75% of meals by RD follow up    Nutrition Goal Status: new  Communication of RD Recs: other (comment) (POC)    Assessment and Plan  Nutrition Problem  Inadequate energy intake     Related to (etiology):   Small bowel obstruction     Signs and Symptoms (as evidenced by):   NPO status      Interventions:  Collaboration with other providers    Nutrition Diagnosis Status:   Continues    Reason for Assessment  Reason For Assessment: RD follow-up  Diagnosis: gastrointestinal disease (small bowel obstruction)  Relevant Medical History: HTN, hypothyroidism, cancer, disorder of kidney and ureter, CAD, UTI, UTI, CAD, digestive disorder, arthritis  Interdisciplinary Rounds: did not attend  General Information Comments: Pt is currently on a clear liquid diet. Dagoberto-18/ umbilical incision, left anterior abdomen. Noted intermittent nausea, history of colon resection c/b parastomal hernia requiring repair. Colostomy on 10/29/19. Noted abdominal pain, N/V, and decreased ostomy output 3 days before admit. Laparoscopy procedure 1/31. NB tube removed 1/31. Unable to conduct NFPE d/t pt being changed by nursing.  Nutrition Discharge Planning: d/c diet to be determined    Nutrition Risk Screen  Nutrition Risk Screen: no indicators present    Nutrition/Diet History  Patient Reported Diet/Restrictions/Preferences: no oral intake  Food Preferences: Unable to assess at this time  Spiritual, Cultural Beliefs, Jewish Practices, Values that Affect Care: no  Factors Affecting Nutritional Intake: altered gastrointestinal function    Anthropometrics  Temp: 98 °F (36.7 °C)  Height Method: Stated  Height: 4' 9" (144.8 cm)  Height (inches): 57 in  Weight Method: Bed Scale  Weight: 61 kg (134 " [Time Spent: ___ minutes] : I have spent [unfilled] minutes of time on the encounter. lb 7.7 oz)  Weight (lb): 134.48 lb  Ideal Body Weight (IBW), Female: 85 lb  % Ideal Body Weight, Female (lb): 167.29 %  BMI (Calculated): 29.1  BMI Grade: 30 - 34.9- obesity - grade I  Usual Body Weight (UBW), k.8 kg  % Usual Body Weight: 98.23  % Weight Change From Usual Weight: -1.98 %     Lab/Procedures/Meds  Pertinent Labs Reviewed: reviewed  Pertinent Labs Comments: BUN 25H  Pertinent Medications Reviewed: reviewed  Pertinent Medications Comments: levothyroxine, pantoprazole    Estimated/Assessed Needs  Weight Used For Calorie Calculations: 61 kg (134 lb 7.7 oz)  Energy Calorie Requirements (kcal): 1830 kcals (30 kcals/kg)  Energy Need Method: Kcal/kg  Protein Requirements: 61g (1g/kg)  Weight Used For Protein Calculations: 61 kg (134 lb 7.7 oz)     Estimated Fluid Requirement Method: RDA Method  RDA Method (mL): 1830     Nutrition Prescription Ordered  Current Diet Order: Clear liquid diet    Evaluation of Received Nutrient/Fluid Intake  I/O: 967/870  Energy Calories Required: not meeting needs  Protein Required: not meeting needs  Fluid Required: not meeting needs  Comments: LBM-  Tolerance: not tolerating  % Intake of Estimated Energy Needs: 0 - 25 %  % Meal Intake: 0 - 25 %    Nutrition Risk  Level of Risk/Frequency of Follow-up: moderate (2x/weekly)     Monitor and Evaluation  Food and Nutrient Intake: energy intake, food and beverage intake  Food and Nutrient Adminstration: diet order  Anthropometric Measurements: weight, weight change, body mass index  Biochemical Data, Medical Tests and Procedures: electrolyte and renal panel, gastrointestinal profile, inflammatory profile, lipid profile     Nutrition Follow-Up  RD Follow-up?: Yes

## 2024-02-01 NOTE — PLAN OF CARE
0800  Updated notes sent to [facility] via [facility].        02/01/24 1100   Rounds   Attendance Nurse ;Provider   Discharge Plan A Return to nursing home  ([facility])   Why the patient remains in the hospital Requires continued medical care   Transition of Care Barriers Transportation     CM was informed by Dr Kapadia that the pt is not medically stable to discharge back to [facility] today & will need IV cefepime x2 weeks following discharge. LUE midline was placed on 1/31/2024.    1145  Pt resting quietly in bed with son, Ernesto Garcia ([phone]), & D-in-L, at the bedside when CM rounded. Pt s/p robotic exp lap done by Dr Johnson on 1/31/2024. Pt denied pain at this time. Pox 92% on RA this AM. Right NG was been removed. Pt tolerating clear liquid diet without difficulty. LUE midline secure with dressing intact. Abd distention noted.     CM informed pt & family of IV abx needed following discharge. All present verbalized understanding & agreement with probably discharge back to [facility] tomorrow.     1250  CM informed Lizzette ([phone]) w/[facility] of above & sent midline documentation via [facility].       Will continue to follow.

## 2024-02-01 NOTE — ASSESSMENT & PLAN NOTE
Patient has a current diagnosis of hypertensive urgency (without evidence of end organ damage) which is controlled.  Latest blood pressure and vitals reviewed-   Temp:  [97.5 °F (36.4 °C)-98.4 °F (36.9 °C)]   Pulse:  [62-77]   Resp:  [13-20]   BP: (107-178)/(49-73)   SpO2:  [92 %-100 %] .   Patient currently off IV antihypertensives.   Home meds for hypertension were reviewed and noted below.   Hypertension Medications               furosemide (LASIX) 20 MG tablet Take 20 mg by mouth once daily.    losartan (COZAAR) 25 MG tablet Take 25 mg by mouth once daily.    metoprolol tartrate (LOPRESSOR) 25 MG tablet Take 25 mg by mouth 2 (two) times daily.            Medication adjustment for hospital antihypertensives is as follows- IV labetalol     Will aim for controlled BP reduction by medications noted above. Monitor and mitigate end organ damage as indicated.    Resume home meds

## 2024-02-01 NOTE — PROGRESS NOTES
[facility]  General Surgery  Progress Note    Subjective:     History of Present Illness:  Annette Garcia is a 88 y.o. female with a history of a fib (on eliquis), CAD, HTN, seizure disorder, LLE DVT, May-Thurner Syndrome, osteoporosis, and remote history of colon resection w/ end colostomy c/b parastomal hernia requiring repair (most recently March 2023) who presents with abdominal pain associated with nausea and vomiting. States that she has also noticed decreased colostomy output over the last three days. In the Ed, the patient is afebrile and hypertensive but otherwise hemodynamically stable. An NGT was placed with minimal return. CT A/P w/ distended loops of small bowel and a transition point in the mid abdomen (also viewed on CT from 12/15/23) and mild wall thickening of the distal esophagus w/ small hiatal hernia. Patient was admitted to hospital medicine. General surgery consulted for small bowel obstruction.         Post-Op Info:  Procedure(s) (LRB):  ROBOTIC LAPAROSCOPY, EXPLORATORY (N/A)  ROBOTIC LYSIS, ADHESIONS (N/A)   1 Day Post-Op     Interval History:   OR yesterday for diagnostic lap, DILIP. Doing well. Pain well controlled. Tolerating CLD without N/V. Ostomy with output in bag.     Medications:  Continuous Infusions:  Scheduled Meds:   ceFEPime IV (PEDS and ADULTS)  1 g Intravenous Q12H    levothyroxine  125 mcg Oral Daily    losartan  25 mg Oral Daily    metoprolol tartrate  25 mg Oral BID    OXcarbazepine  150 mg Oral Nightly    pantoprazole  40 mg Oral Daily    PHENobarbitaL  97.2 mg Oral QHS    pravastatin  20 mg Oral QHS     PRN Meds:sodium chloride 0.9%, dextrose 10%, dextrose 10%, dextrose 10%, dextrose 10%, glucagon (human recombinant), glucagon (human recombinant), glucose, glucose, HYDROcodone-acetaminophen, influenza 65up-adj, naloxone, ondansetron, sodium chloride 0.9%     Review of patient's allergies indicates:   Allergen Reactions    Adhesive Itching and Blisters     Penicillins Anaphylaxis    Tramadol Hives    Avelox [moxifloxacin] Rash     Facial and arm itching and redness. Pt states throat closes when given.    Amoxil [amoxicillin]     Aspridrox [aspirin, buffered]     Codeine Other (See Comments)     Throat swelling    Keflex [cephalexin]      Tolerated cefepime and cefazolin    Norvasc [amlodipine]     Red dye Hives    Robitussin [guaifenesin]     Sulfa (sulfonamide antibiotics)     Tylenol [acetaminophen]      Has reaction to Tylenol with red dye and unable to take Extra Strength Tylenol/ CAN ONLY TOLERATE REG STRENGTH TYLENOL    Vicks vaporub [camphor-eucalyptus oil-menthol]      Objective:     Vital Signs (Most Recent):  Temp: 98.2 °F (36.8 °C) (02/01/24 0334)  Pulse: 70 (02/01/24 0428)  Resp: 18 (02/01/24 0334)  BP: (!) 118/56 (02/01/24 0334)  SpO2: 96 % (02/01/24 0334) Vital Signs (24h Range):  Temp:  [97.4 °F (36.3 °C)-98.4 °F (36.9 °C)] 98.2 °F (36.8 °C)  Pulse:  [62-77] 70  Resp:  [13-19] 18  SpO2:  [92 %-100 %] 96 %  BP: (107-178)/(49-73) 118/56     Weight: 61 kg (134 lb 7.7 oz)  Body mass index is 29.1 kg/m².    Intake/Output - Last 3 Shifts         01/30 0700  01/31 0659 01/31 0700 02/01 0659 02/01 0700 02/02 0659    I.V. (mL/kg)  24.6 (0.4)     IV Piggyback 96.6 943.2     Total Intake(mL/kg) 96.6 (1.4) 967.8 (15.9)     Urine (mL/kg/hr) 0 (0) 350 (0.2)     Drains 500 420     Stool 300 100     Total Output 800 870     Net -703.4 +97.8            Urine Occurrence 1 x               Physical Exam  Vitals reviewed.   Constitutional:       General: She is not in acute distress.     Appearance: Normal appearance. She is not toxic-appearing.   HENT:      Head: Normocephalic and atraumatic.      Nose: Nose normal.      Mouth/Throat:      Mouth: Mucous membranes are moist.   Cardiovascular:      Rate and Rhythm: Normal rate.      Pulses: Normal pulses.   Pulmonary:      Effort: Pulmonary effort is normal. No respiratory distress.   Abdominal:      General: Abdomen is  flat.      Palpations: Abdomen is soft.      Tenderness: There is no guarding or rebound.      Comments: Appropriately TTP  Incisions c/d/i with dermabond overlaying  LLQ ostomy with ostomy appliance overlaying, stool in bag    Skin:     General: Skin is warm.   Neurological:      General: No focal deficit present.      Mental Status: She is alert and oriented to person, place, and time.   Psychiatric:         Mood and Affect: Mood normal.         Behavior: Behavior normal.          Significant Labs:  I have reviewed all pertinent lab results within the past 24 hours.  CBC:   Recent Labs   Lab 02/01/24  0531   WBC 6.50   RBC 2.94*   HGB 9.8*   HCT 29.1*      MCV 99*   MCH 33.3*   MCHC 33.7     CMP:   Recent Labs   Lab 01/29/24  0425 01/31/24  0542   * 84   CALCIUM 9.3 9.0   ALBUMIN 3.6  --    PROT 6.8  --     139   K 3.7 3.7   CO2 28 26   CL 99 99   BUN 14 19   CREATININE 0.9 0.7   ALKPHOS 83  --    ALT 15  --    AST 14  --    BILITOT 0.3  --        Significant Diagnostics:  I have reviewed all pertinent imaging results/findings within the past 24 hours.  Assessment/Plan:     S/P partial resection of colon  88 F with a history of a fib (on eliquis), CAD, HTN, seizure disorder, LLE DVT, May-Thurner Syndrome, osteoporosis, and remote history of colon resection w/ end colostomy c/b parastomal hernia requiring repair (most recently March 2023) who presents with abdominal pain associated with nausea and vomiting. CT A/P with dilated loops of small bowel w/ transition point in the mid abdomen. This appears similar to scan on 12/15/23. Will treat conservatively with NGT decompression and bowel rest. Gastrograffin challenge 1/29 with contrast throughout the colon on 4 and 8 hour films. 950cc of ostomy output yesterday and minimal, thin NGT output. Now s/p diagnostic lap with DILIP. Doing well post operatively.     -- CLD  -- MM pain and nausea control   -- PT/OT  -- continue to hold eliquis   -- remainder  of care per primary team           Swapna Chan MD  General Surgery  [facility]

## 2024-02-01 NOTE — ASSESSMENT & PLAN NOTE
S/p partial resection on 1/31/2024    NG tube discontinued    General Surgery on consult  CLD, ADAT  Holding Eliquis

## 2024-02-01 NOTE — PLAN OF CARE
Problem: Adult Inpatient Plan of Care  Goal: Plan of Care Review  Outcome: Ongoing, Progressing  Goal: Patient-Specific Goal (Individualized)  Outcome: Ongoing, Progressing  Goal: Absence of Hospital-Acquired Illness or Injury  Outcome: Ongoing, Progressing  Goal: Optimal Comfort and Wellbeing  Outcome: Ongoing, Progressing  Goal: Readiness for Transition of Care  Outcome: Ongoing, Progressing     Problem: Fluid Deficit (Intestinal Obstruction)  Goal: Fluid Balance  Outcome: Ongoing, Progressing     Problem: Infection (Intestinal Obstruction)  Goal: Absence of Infection Signs and Symptoms  Outcome: Ongoing, Progressing     Problem: Nausea and Vomiting (Intestinal Obstruction)  Goal: Nausea and Vomiting Relief  Outcome: Ongoing, Progressing     Problem: Pain (Intestinal Obstruction)  Goal: Acceptable Pain Control  Outcome: Ongoing, Progressing     Problem: Hypertension Comorbidity  Goal: Blood Pressure in Desired Range  Outcome: Ongoing, Progressing     Problem: Osteoarthritis Comorbidity  Goal: Maintenance of Osteoarthritis Symptom Control  Outcome: Ongoing, Progressing     Problem: Seizure Disorder Comorbidity  Goal: Maintenance of Seizure Control  Outcome: Ongoing, Progressing     Problem: UTI (Urinary Tract Infection)  Goal: Improved Infection Symptoms  Outcome: Ongoing, Progressing     Problem: Fall Injury Risk  Goal: Absence of Fall and Fall-Related Injury  Outcome: Ongoing, Progressing     Problem: Skin Injury Risk Increased  Goal: Skin Health and Integrity  Outcome: Ongoing, Progressing     Problem: Impaired Wound Healing  Goal: Optimal Wound Healing  Outcome: Ongoing, Progressing     Problem: Infection  Goal: Absence of Infection Signs and Symptoms  Outcome: Ongoing, Progressing

## 2024-02-01 NOTE — PT/OT/SLP EVAL
Physical Therapy Evaluation and Treatment    Patient Name:  Annette Garcia   MRN:  [id number]    Recommendations:     Discharge Recommendations: Low Intensity Therapy (HH PT and return to [facility])   Discharge Equipment Recommendations: none   Barriers to discharge:  requires 24/7 physical assistance for safety with mobility    Assessment:     Annette Garcia is a 88 y.o. female admitted with a medical diagnosis of Small bowel obstruction.  She presents with the following impairments/functional limitations: weakness, impaired endurance, impaired self care skills, impaired functional mobility, gait instability, impaired balance, decreased lower extremity function.    PT evaluation completed. Pt was prior resident at [facility] for ~6 years. Pt reports staff assist her with bathing, dressing, transfers and meal prep and she receives therapy at facility. Pt at this time requires MOD A with bed mobility and transfers to standing with few lateral steps along EOB with use of RW. Therapy recommending return to [facility] with low intensity therapy (HH PT). Therapy will continue to progress pt as able.        Rehab Prognosis: Good; patient would benefit from acute skilled PT services to address these deficits and reach maximum level of function.    Recent Surgery: Procedure(s) (LRB):  ROBOTIC LAPAROSCOPY, EXPLORATORY (N/A)  ROBOTIC LYSIS, ADHESIONS (N/A) 1 Day Post-Op    Plan:     During this hospitalization, patient to be seen 3 x/week to address the identified rehab impairments via gait training, therapeutic activities, therapeutic exercises, neuromuscular re-education, wheelchair management/training and progress toward the following goals:    Plan of Care Expires:  03/01/24    Subjective     Chief Complaint: no complaints; excited she gets a real meal again this evening  Patient/Family Comments/goals: to return to [facility]  Pain/Comfort:  Pain Rating 1: 0/10  Pain Rating Post-Intervention 1: 0/10    Patients cultural,  spiritual, Yazidism conflicts given the current situation: no    Living Environment:  Resident at [facility].   Prior to admission, patients level of function was: assistance from [facility] staff; W/c mobility MOD I .  Equipment used at home:  (NH DME; reports use of RW and w/c).  DME owned (not currently used): none.  Upon discharge, patient will have assistance from [facility] staff.    Objective:     Communicated with Nurse prior to session.  Patient found HOB elevated with bed alarm, colostomy, peripheral IV  upon PT entry to room.    General Precautions: Standard, fall  Orthopedic Precautions:N/A   Braces: N/A  Respiratory Status: Room air    Exams:  Cognitive Exam:  Patient is oriented to Person, Place, Situation, and able to recall current month; not able to recall current year  Sensation:    -       Intact  light/touch to BLE  RLE ROM: WFL  RLE Strength: 3-/5 hip flexion, 3/5 knee extension and ankle PF/DF  LLE ROM: WFL  LLE Strength: 3-/5 hip flexion, 3/5 knee extension and ankle PF/DF    Functional Mobility:  Bed Mobility:     Supine to Sit: moderate assistance  Sit to Supine: maximal assistance  Transfers:     Sit to Stand:  moderate assistance with rolling walker  Gait: ~5 BLE lateral steps to R and L with use of RW and MOD A; assist with coordination of RW.       AM-PAC 6 CLICK MOBILITY  Total Score:10       Treatment & Education:  Pt educated on role of PT.   Pt requires MAX/MOD A with use of RW.   Pt requires increased time and verbal cues to improve sequencing and safety with transfers.   Pt reports she receives PT and OT at nursing home and does her exercises frequently.   Pt reports fatigue at end of session and would like to return to supine back in bed.    Patient left HOB elevated with all lines intact, call button in reach, bed alarm on, and Nurse notified.    GOALS:   Multidisciplinary Problems       Physical Therapy Goals          Problem: Physical Therapy    Goal Priority Disciplines Outcome  Goal Variances Interventions   Physical Therapy Goal     PT, PT/OT Ongoing, Progressing     Description: Goals to be met by: 3/1/24     Patient will increase functional independence with mobility by performin. Supine to sit with Contact Guard Assistance  2. Sit to supine with Contact Guard Assistance  3. Sit to stand transfer with Contact Guard Assistance with use of RW  4. Bed to chair transfer with Contact Guard Assistance using Rolling Walker  5. Gait  x 15 feet with Minimal Assistance using Rolling Walker.   6. 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    In the setting of septic shock and near death    Petit mal epilepsy     Scoliosis of lumbar spine     Seizures     Unspecified hypothyroidism     UTI (urinary tract infection) 2022       Past Surgical History:   Procedure Laterality Date    APPENDECTOMY      BACK SURGERY      vertebral fracture    BACK SURGERY  2013    lumbar L2-5    CATARACT EXTRACTION, BILATERAL Bilateral     CHOLECYSTECTOMY      open    ESOPHAGOGASTRODUODENOSCOPY N/A 2/15/2023    Procedure: EGD (ESOPHAGOGASTRODUODENOSCOPY);  Surgeon: Keith Chavarria MD;  Location: [facility] ENDO;  Service: Endoscopy;  Laterality: N/A;    EYE SURGERY Bilateral     cataract removal with lens implant    HYSTERECTOMY      PERCUTANEOUS TRANSLUMINAL ANGIOPLASTY (PTA) OF PERIPHERAL VESSEL N/A 2/3/2020    Procedure: PTA, PERIPHERAL VESSEL;  Surgeon: Timmy Simmons MD;  Location: [facility] CATH LAB/EP;  Service: Cardiology;  Laterality: N/A;    PORTACATH PLACEMENT Right  09/2016    RENAL ARTERY STENT Left 07/19/2017    ROBOT-ASSISTED LAPAROSCOPIC REPAIR OF VENTRAL HERNIA N/A 3/23/2023    Procedure: ROBOTIC REPAIR, HERNIA, VENTRAL;  Surgeon: Chris Johnson MD;  Location: [facility] OR;  Service: General;  Laterality: N/A;  Robotic Parastomal hernia repair    SIGMOIDECTOMY  10/29/2019    SMALL INTESTINE SURGERY  08/23/2016    THROMBECTOMY N/A 2/3/2020    Procedure: THROMBECTOMY;  Surgeon: Timmy Simmons MD;  Location: [facility] CATH LAB/EP;  Service: Cardiology;  Laterality: N/A;    TONSILLECTOMY      VAGINAL HYSTERECTOMY W/ ANTERIOR AND POSTERIOR VAGINAL REPAIR         Time Tracking:     PT Received On: 02/01/24  PT Start Time: 1341     PT Stop Time: 1405  PT Total Time (min): 24 min     Billable Minutes: Evaluation 10 and Therapeutic Activity 14      02/01/2024

## 2024-02-01 NOTE — ASSESSMENT & PLAN NOTE
Urine culture demonstrating multi-resistant Pseudomonas  Notable history of Acinetobacter, Enterobacter, and Pseudomonas UTI in the past    Plan:  IV cefepime for 2 weeks. Midline order placed  ID consult  Outpatient Urology referral, given recurrent Pseudomonas infection

## 2024-02-01 NOTE — PLAN OF CARE
Problem: Physical Therapy  Goal: Physical Therapy Goal  Description: Goals to be met by: 3/1/24     Patient will increase functional independence with mobility by performin. Supine to sit with Contact Guard Assistance  2. Sit to supine with Contact Guard Assistance  3. Sit to stand transfer with Contact Guard Assistance with use of RW  4. Bed to chair transfer with Contact Guard Assistance using Rolling Walker  5. Gait  x 15 feet with Minimal Assistance using Rolling Walker.   6. Wheelchair propulsion x50 feet with Contact Guard Assistance using bilateral upper extremities    Outcome: Ongoing, Progressing    PT evaluation completed. Pt was prior resident at [facility] for ~6 years. Pt reports staff assist her with bathing, dressing, transfers and meal prep and she receives therapy at facility. Pt at this time requires MOD A with bed mobility and transfers to standing with few lateral steps along EOB with use of RW. Therapy recommending return to [facility] with low intensity therapy (HH PT). Therapy will continue to progress pt as able.

## 2024-02-01 NOTE — PROGRESS NOTES
[facility] Medicine  Progress Note    Patient Name: Annette Garcia  MRN: [id number]  Patient Class: IP- Inpatient   Admission Date: 1/28/2024  Length of Stay: 4 days  Attending Physician: Graham Kapadia MD  Primary Care Provider: [facility]        Subjective:     Principal Problem:Small bowel obstruction        HPI:  88 years old female with past medical history significant for HTN, seizure disorder, afib on eliquis, LLE DVT, May-Thurner Syndrome, SBO s/p colostomy, and osteoporosis, who came to ER because of vomiting and abdominal pain since yesterday.  Patient reported she had multiple episodes of nonbloody vomiting since yesterday.  Patient reported she has been having abdominal pain since yesterday which is localized to central abdomen, 6-7/10 in severity and nonradiating.  Patient reported she has history of small-bowel obstruction.  Patient also reported feeling nauseous.  Patient denied any other symptoms including chest pain shortness of breath diarrhea fever chills cough or dizziness. CT A&P showed ''distended loops of small bowel with a transition point in the mid abdomen, at an identical location to prior CT dated 12/15/2023.  Findings are concerning for partial small bowel obstruction. Postoperative changes of partial small and large bowel resection with a left lower quadrant colostomy. Small hiatal hernia with mild wall thickening of the distal esophageal wall and minimal paraesophageal fluid''. Chest xray negative for any acute abnormality. US showed UTI. Patient received IV fluid and general surgery has been consulted in ER.  In the ER, blood pressure was 200/93 mm Hg, patient received IV labetalol 10 mg, most recent blood pressure is 147/74 mm Hg.  I also spoke to patient's daughter at bedside.      Overview/Hospital Course:  1/29/274: surgery on board, Gastrografin challenge today    Interval History:   No acute events  Family at bedside  Tolerating CLD  Denies  pain  No n/v/d  No complaints    Review of Systems   Constitutional:  Negative for chills and fever.   Respiratory:  Negative for cough and shortness of breath.    Cardiovascular:  Negative for chest pain.   Gastrointestinal:  Negative for abdominal pain, diarrhea and nausea.   Genitourinary:  Negative for dysuria.   Neurological:  Negative for dizziness and headaches.   Psychiatric/Behavioral:  Negative for confusion.      Objective:     Vital Signs (Most Recent):  Temp: 98 °F (36.7 °C) (02/01/24 0737)  Pulse: 74 (02/01/24 0824)  Resp: 16 (02/01/24 0824)  BP: (!) 127/59 (02/01/24 0737)  SpO2: (!) 92 % (02/01/24 0824) Vital Signs (24h Range):  Temp:  [97.4 °F (36.3 °C)-98.4 °F (36.9 °C)] 98 °F (36.7 °C)  Pulse:  [62-77] 74  Resp:  [13-20] 16  SpO2:  [92 %-100 %] 92 %  BP: (107-178)/(49-73) 127/59     Weight: 61 kg (134 lb 7.7 oz)  Body mass index is 29.1 kg/m².    Intake/Output Summary (Last 24 hours) at 2/1/2024 1055  Last data filed at 2/1/2024 0637  Gross per 24 hour   Intake 967.81 ml   Output 650 ml   Net 317.81 ml           Physical Exam  Constitutional:       Appearance: Normal appearance.   HENT:      Mouth/Throat:      Mouth: Mucous membranes are moist.      Pharynx: Oropharynx is clear.   Eyes:      Extraocular Movements: Extraocular movements intact.      Conjunctiva/sclera: Conjunctivae normal.   Cardiovascular:      Rate and Rhythm: Normal rate. Rhythm irregular.   Pulmonary:      Effort: Pulmonary effort is normal.      Breath sounds: Normal breath sounds.   Abdominal:      Palpations: Abdomen is soft.      Comments: LLQ ostomy in place.   Surgical dressings in place.    Musculoskeletal:         General: Normal range of motion.   Skin:     General: Skin is warm and dry.   Neurological:      General: No focal deficit present.      Mental Status: She is alert and oriented to person, place, and time.   Psychiatric:         Mood and Affect: Mood normal.             Significant Labs: All pertinent labs  within the past 24 hours have been reviewed.    Significant Imaging: I have reviewed all pertinent imaging results/findings within the past 24 hours.    Assessment/Plan:      * Small bowel obstruction  S/p partial resection on 1/31/2024    NG tube discontinued    General Surgery on consult  CLD, ADAT  Holding Eliquis      Hypertensive urgency  Patient has a current diagnosis of hypertensive urgency (without evidence of end organ damage) which is controlled.  Latest blood pressure and vitals reviewed-   Temp:  [97.5 °F (36.4 °C)-98.4 °F (36.9 °C)]   Pulse:  [62-77]   Resp:  [13-20]   BP: (107-178)/(49-73)   SpO2:  [92 %-100 %] .   Patient currently off IV antihypertensives.   Home meds for hypertension were reviewed and noted below.   Hypertension Medications               furosemide (LASIX) 20 MG tablet Take 20 mg by mouth once daily.    losartan (COZAAR) 25 MG tablet Take 25 mg by mouth once daily.    metoprolol tartrate (LOPRESSOR) 25 MG tablet Take 25 mg by mouth 2 (two) times daily.            Medication adjustment for hospital antihypertensives is as follows- IV labetalol     Will aim for controlled BP reduction by medications noted above. Monitor and mitigate end organ damage as indicated.    Resume home meds    UTI (urinary tract infection)  Urine culture demonstrating multi-resistant Pseudomonas  Notable history of Acinetobacter, Enterobacter, and Pseudomonas UTI in the past    Plan:  IV cefepime for 2 weeks. Midline order placed  ID consult  Outpatient Urology referral, given recurrent Pseudomonas infection        A-fib  Patient with Persistent (7 days or more) atrial fibrillation which is controlled currently with Beta Blocker. Patient is currently in sinus rhythm.[id number] Score: 4. Anticoagulation indicated. Anticoagulation done with home med eliquis . Holding eliquis per suregry request.    S/P partial resection of colon        Nausea and vomiting  resolved      Hypothyroidism  Continue home med  levothyroxine      Seizure disorder  Resume home meds -trileptal, phenobarb        VTE Risk Mitigation (From admission, onward)           Ordered     IP VTE HIGH RISK PATIENT  Once         01/1937     Place sequential compression device  Until discontinued         01/1937                    Discharge Planning   JESSICA: 2/2/2024     Code Status: Full Code   Is the patient medically ready for discharge?:     Reason for patient still in hospital (select all that apply): Patient trending condition, Laboratory test, Treatment, and Consult recommendations  Discharge Plan A: Return to nursing home ([facility])   Discharge Delays: None known at this time              Graham Kapadia MD  Department of Hospital Medicine   [facility]

## 2024-02-01 NOTE — ASSESSMENT & PLAN NOTE
88 F with a history of a fib (on eliquis), CAD, HTN, seizure disorder, LLE DVT, May-Thurner Syndrome, osteoporosis, and remote history of colon resection w/ end colostomy c/b parastomal hernia requiring repair (most recently March 2023) who presents with abdominal pain associated with nausea and vomiting. CT A/P with dilated loops of small bowel w/ transition point in the mid abdomen. This appears similar to scan on 12/15/23. Will treat conservatively with NGT decompression and bowel rest. Gastrograffin challenge 1/29 with contrast throughout the colon on 4 and 8 hour films. 950cc of ostomy output yesterday and minimal, thin NGT output. Now s/p diagnostic lap with DILIP. Doing well post operatively.     -- CLD  -- MM pain and nausea control   -- PT/OT  -- continue to hold eliquis   -- remainder of care per primary team

## 2024-02-01 NOTE — PLAN OF CARE
[facility]    FACILITY TRANSFER ORDERS      Patient Name: Annette Garcia  YOB: 1935    PCP: [facility]   PCP Address: [address] [place] [address] / ARIELLE STEPHENS  PCP Phone Number: [phone]  PCP Fax: [phone]    Encounter Date: 02/02/2024    Admit to: [facility]     Vital Signs:  Routine    Diagnoses:   Active Hospital Problems    Diagnosis  POA    *Small bowel obstruction [K56.609]  Yes    UTI (urinary tract infection) [N39.0]  Yes    Hypertensive urgency [I16.0]  Yes    A-fib [I48.91]  Yes    S/P partial resection of colon [Z90.49]  Not Applicable    Hypothyroidism [E03.9]  Yes    Seizure disorder [G40.909]  Yes     Epilepsy type unknown      Nausea and vomiting [R11.2]  Yes      Resolved Hospital Problems   No resolved problems to display.       Allergies:  Review of patient's allergies indicates:   Allergen Reactions    Adhesive Itching and Blisters    Penicillins Anaphylaxis    Tramadol Hives    Avelox [moxifloxacin] Rash     Facial and arm itching and redness. Pt states throat closes when given.    Amoxil [amoxicillin]     Aspridrox [aspirin, buffered]     Codeine Other (See Comments)     Throat swelling    Keflex [cephalexin]      Tolerated cefepime and cefazolin    Norvasc [amlodipine]     Red dye Hives    Robitussin [guaifenesin]     Sulfa (sulfonamide antibiotics)     Tylenol [acetaminophen]      Has reaction to Tylenol with red dye and unable to take Extra Strength Tylenol/ CAN ONLY TOLERATE REG STRENGTH TYLENOL    Vicks vaporub [camphor-eucalyptus oil-menthol]        Diet: regular diet  Activities: Activity as tolerated    Goals of Care Treatment Preferences:  Code Status: Full Code    Living Will: Yes              CONSULTS:    Physical Therapy to evaluate and treat.  and Occupational Therapy to evaluate and treat.    MISCELLANEOUS CARE:  Colostomy Care: Empty bag every shift and PRN. Change and clean site every 48 hours.     Daily midline care:  Clean site gently, change dressing, observe for any signs of infection or discomfort.     Medications: Review discharge medications with patient and family and provide education.      Current Discharge Medication List        START taking these medications    Details   dextrose 5 % in water (D5W) PgBk 100 mL with ceFEPIme 1 gram SolR 1 g Inject 1 g into the vein every 12 (twelve) hours. for 10 days  Qty: 20 g, Refills: 0           CONTINUE these medications which have NOT CHANGED    Details   acetaminophen (TYLENOL) 325 MG tablet Take 2 tablets (650 mg total) by mouth every 4 (four) hours as needed for Pain.  Refills: 0      apixaban (ELIQUIS) 2.5 mg Tab Take 1 tablet (2.5 mg total) by mouth 2 (two) times daily.  Qty:        calcium-vitamin D 600 mg(1,500mg) -400 unit Tab Take 1 tablet by mouth once daily.      docusate sodium (COLACE) 100 MG capsule Take 1 capsule (100 mg total) by mouth 2 (two) times daily.  Refills: 0      furosemide (LASIX) 20 MG tablet Take 20 mg by mouth once daily.      levothyroxine (SYNTHROID) 125 MCG tablet TAKE 1 TABLET (125 MCG TOTAL) BY MOUTH ONCE DAILY.  Qty: 90 tablet, Refills: 3      losartan (COZAAR) 25 MG tablet Take 25 mg by mouth once daily.      magnesium oxide (MAG-OX) 400 mg (241.3 mg magnesium) tablet Take 2 tablets (800 mg total) by mouth once daily.  Refills: 0      metoprolol tartrate (LOPRESSOR) 25 MG tablet Take 25 mg by mouth 2 (two) times daily.      ondansetron (ZOFRAN) 4 MG tablet Take 4 mg by mouth every 6 (six) hours as needed for Nausea (vomiting).      OXcarbazepine (TRILEPTAL) 150 MG Tab Take 150 mg by mouth nightly.      pantoprazole (PROTONIX) 40 MG tablet Take 40 mg by mouth once daily.      PHENobarbitaL 97.2 MG tablet Take 97.2 mg by mouth every evening.      polyethylene glycol (GLYCOLAX) 17 gram PwPk Take 17 g by mouth once daily.  Qty: 30 packet, Refills: 5      potassium chloride SA (K-DUR,KLOR-CON M) 10 MEQ tablet Take 1 tablet (10 mEq total) by mouth  once daily.  Qty: 120 tablet, Refills: 0      pravastatin (PRAVACHOL) 20 MG tablet Take 20 mg by mouth every evening.      vitamin D (VITAMIN D3) 1000 units Tab Take 1,000 Units by mouth once daily.                Immunizations Administered as of 2/2/2024       No immunizations on file.                _________________________________  Graham Kapadia MD  02/02/2024

## 2024-02-01 NOTE — SUBJECTIVE & OBJECTIVE
Interval History:   No acute events  Family at bedside  Tolerating CLD  Denies pain  No n/v/d  No complaints    Review of Systems   Constitutional:  Negative for chills and fever.   Respiratory:  Negative for cough and shortness of breath.    Cardiovascular:  Negative for chest pain.   Gastrointestinal:  Negative for abdominal pain, diarrhea and nausea.   Genitourinary:  Negative for dysuria.   Neurological:  Negative for dizziness and headaches.   Psychiatric/Behavioral:  Negative for confusion.      Objective:     Vital Signs (Most Recent):  Temp: 98 °F (36.7 °C) (02/01/24 0737)  Pulse: 74 (02/01/24 0824)  Resp: 16 (02/01/24 0824)  BP: (!) 127/59 (02/01/24 0737)  SpO2: (!) 92 % (02/01/24 0824) Vital Signs (24h Range):  Temp:  [97.4 °F (36.3 °C)-98.4 °F (36.9 °C)] 98 °F (36.7 °C)  Pulse:  [62-77] 74  Resp:  [13-20] 16  SpO2:  [92 %-100 %] 92 %  BP: (107-178)/(49-73) 127/59     Weight: 61 kg (134 lb 7.7 oz)  Body mass index is 29.1 kg/m².    Intake/Output Summary (Last 24 hours) at 2/1/2024 1055  Last data filed at 2/1/2024 0637  Gross per 24 hour   Intake 967.81 ml   Output 650 ml   Net 317.81 ml           Physical Exam  Constitutional:       Appearance: Normal appearance.   HENT:      Mouth/Throat:      Mouth: Mucous membranes are moist.      Pharynx: Oropharynx is clear.   Eyes:      Extraocular Movements: Extraocular movements intact.      Conjunctiva/sclera: Conjunctivae normal.   Cardiovascular:      Rate and Rhythm: Normal rate. Rhythm irregular.   Pulmonary:      Effort: Pulmonary effort is normal.      Breath sounds: Normal breath sounds.   Abdominal:      Palpations: Abdomen is soft.      Comments: LLQ ostomy in place.   Surgical dressings in place.    Musculoskeletal:         General: Normal range of motion.   Skin:     General: Skin is warm and dry.   Neurological:      General: No focal deficit present.      Mental Status: She is alert and oriented to person, place, and time.   Psychiatric:         Mood  and Affect: Mood normal.             Significant Labs: All pertinent labs within the past 24 hours have been reviewed.    Significant Imaging: I have reviewed all pertinent imaging results/findings within the past 24 hours.

## 2024-02-01 NOTE — PLAN OF CARE
Pt remains A+Ox4 post surgery. NGT remains clamped, pt tolerating CLD well at this time. Possible NGT removal this PM. Monitoring for return of bowel function via ostomy. Denies pain. Voiding freely. Family at bedside and aware of POC. Safety precautions maintained.     Problem: Adult Inpatient Plan of Care  Goal: Plan of Care Review  Outcome: Ongoing, Progressing  Goal: Absence of Hospital-Acquired Illness or Injury  Outcome: Ongoing, Progressing  Goal: Optimal Comfort and Wellbeing  Outcome: Ongoing, Progressing  Goal: Readiness for Transition of Care  Outcome: Ongoing, Progressing     Problem: Nausea and Vomiting (Intestinal Obstruction)  Goal: Nausea and Vomiting Relief  Outcome: Ongoing, Progressing     Problem: Pain (Intestinal Obstruction)  Goal: Acceptable Pain Control  Outcome: Ongoing, Progressing     Problem: Hypertension Comorbidity  Goal: Blood Pressure in Desired Range  Outcome: Ongoing, Progressing     Problem: UTI (Urinary Tract Infection)  Goal: Improved Infection Symptoms  Outcome: Ongoing, Progressing     Problem: Fall Injury Risk  Goal: Absence of Fall and Fall-Related Injury  Outcome: Ongoing, Progressing     Problem: Infection  Goal: Absence of Infection Signs and Symptoms  Outcome: Ongoing, Progressing

## 2024-02-01 NOTE — PLAN OF CARE
Recommendations  Recommendation:   1. When medically acceptable, advance diet as tolerated to a regular diet   2. Monitor need for ONS   3. Monitor weight and labs    Goals: Pt will consume 50-75% of meals by RD follow up    Nutrition Goal Status: new  Communication of RD Recs: other (comment) (POC)

## 2024-02-01 NOTE — SUBJECTIVE & OBJECTIVE
Interval History:   OR yesterday for diagnostic lap, DILIP. Doing well. Pain well controlled. Tolerating CLD without N/V. Ostomy with output in bag.     Medications:  Continuous Infusions:  Scheduled Meds:   ceFEPime IV (PEDS and ADULTS)  1 g Intravenous Q12H    levothyroxine  125 mcg Oral Daily    losartan  25 mg Oral Daily    metoprolol tartrate  25 mg Oral BID    OXcarbazepine  150 mg Oral Nightly    pantoprazole  40 mg Oral Daily    PHENobarbitaL  97.2 mg Oral QHS    pravastatin  20 mg Oral QHS     PRN Meds:sodium chloride 0.9%, dextrose 10%, dextrose 10%, dextrose 10%, dextrose 10%, glucagon (human recombinant), glucagon (human recombinant), glucose, glucose, HYDROcodone-acetaminophen, influenza 65up-adj, naloxone, ondansetron, sodium chloride 0.9%     Review of patient's allergies indicates:   Allergen Reactions    Adhesive Itching and Blisters    Penicillins Anaphylaxis    Tramadol Hives    Avelox [moxifloxacin] Rash     Facial and arm itching and redness. Pt states throat closes when given.    Amoxil [amoxicillin]     Aspridrox [aspirin, buffered]     Codeine Other (See Comments)     Throat swelling    Keflex [cephalexin]      Tolerated cefepime and cefazolin    Norvasc [amlodipine]     Red dye Hives    Robitussin [guaifenesin]     Sulfa (sulfonamide antibiotics)     Tylenol [acetaminophen]      Has reaction to Tylenol with red dye and unable to take Extra Strength Tylenol/ CAN ONLY TOLERATE REG STRENGTH TYLENOL    Vicks vaporub [camphor-eucalyptus oil-menthol]      Objective:     Vital Signs (Most Recent):  Temp: 98.2 °F (36.8 °C) (02/01/24 0334)  Pulse: 70 (02/01/24 0428)  Resp: 18 (02/01/24 0334)  BP: (!) 118/56 (02/01/24 0334)  SpO2: 96 % (02/01/24 0334) Vital Signs (24h Range):  Temp:  [97.4 °F (36.3 °C)-98.4 °F (36.9 °C)] 98.2 °F (36.8 °C)  Pulse:  [62-77] 70  Resp:  [13-19] 18  SpO2:  [92 %-100 %] 96 %  BP: (107-178)/(49-73) 118/56     Weight: 61 kg (134 lb 7.7 oz)  Body mass index is 29.1  kg/m².    Intake/Output - Last 3 Shifts         01/30 0700  01/31 0659 01/31 0700  02/01 0659 02/01 0700 02/02 0659    I.V. (mL/kg)  24.6 (0.4)     IV Piggyback 96.6 943.2     Total Intake(mL/kg) 96.6 (1.4) 967.8 (15.9)     Urine (mL/kg/hr) 0 (0) 350 (0.2)     Drains 500 420     Stool 300 100     Total Output 800 870     Net -703.4 +97.8            Urine Occurrence 1 x               Physical Exam  Vitals reviewed.   Constitutional:       General: She is not in acute distress.     Appearance: Normal appearance. She is not toxic-appearing.   HENT:      Head: Normocephalic and atraumatic.      Nose: Nose normal.      Mouth/Throat:      Mouth: Mucous membranes are moist.   Cardiovascular:      Rate and Rhythm: Normal rate.      Pulses: Normal pulses.   Pulmonary:      Effort: Pulmonary effort is normal. No respiratory distress.   Abdominal:      General: Abdomen is flat.      Palpations: Abdomen is soft.      Tenderness: There is no guarding or rebound.      Comments: Appropriately TTP  Incisions c/d/i with dermabond overlaying  LLQ ostomy with ostomy appliance overlaying, stool in bag    Skin:     General: Skin is warm.   Neurological:      General: No focal deficit present.      Mental Status: She is alert and oriented to person, place, and time.   Psychiatric:         Mood and Affect: Mood normal.         Behavior: Behavior normal.          Significant Labs:  I have reviewed all pertinent lab results within the past 24 hours.  CBC:   Recent Labs   Lab 02/01/24  0531   WBC 6.50   RBC 2.94*   HGB 9.8*   HCT 29.1*      MCV 99*   MCH 33.3*   MCHC 33.7     CMP:   Recent Labs   Lab 01/29/24  0425 01/31/24  0542   * 84   CALCIUM 9.3 9.0   ALBUMIN 3.6  --    PROT 6.8  --     139   K 3.7 3.7   CO2 28 26   CL 99 99   BUN 14 19   CREATININE 0.9 0.7   ALKPHOS 83  --    ALT 15  --    AST 14  --    BILITOT 0.3  --        Significant Diagnostics:  I have reviewed all pertinent imaging results/findings within  the past 24 hours.

## 2024-02-02 VITALS
WEIGHT: 137.13 LBS | HEART RATE: 68 BPM | DIASTOLIC BLOOD PRESSURE: 63 MMHG | SYSTOLIC BLOOD PRESSURE: 145 MMHG | TEMPERATURE: 98 F | BODY MASS INDEX: 29.58 KG/M2 | HEIGHT: 57 IN | RESPIRATION RATE: 18 BRPM | OXYGEN SATURATION: 100 %

## 2024-02-02 LAB
ANION GAP SERPL CALC-SCNC: 9 MMOL/L (ref 8–16)
BASOPHILS # BLD AUTO: 0.02 K/UL (ref 0–0.2)
BASOPHILS NFR BLD: 0.4 % (ref 0–1.9)
BUN SERPL-MCNC: 17 MG/DL (ref 8–23)
CALCIUM SERPL-MCNC: 8.7 MG/DL (ref 8.7–10.5)
CHLORIDE SERPL-SCNC: 98 MMOL/L (ref 95–110)
CO2 SERPL-SCNC: 28 MMOL/L (ref 23–29)
CREAT SERPL-MCNC: 0.6 MG/DL (ref 0.5–1.4)
DIFFERENTIAL METHOD BLD: ABNORMAL
EOSINOPHIL # BLD AUTO: 0.2 K/UL (ref 0–0.5)
EOSINOPHIL NFR BLD: 3.4 % (ref 0–8)
ERYTHROCYTE [DISTWIDTH] IN BLOOD BY AUTOMATED COUNT: 13.4 % (ref 11.5–14.5)
EST. GFR  (NO RACE VARIABLE): >60 ML/MIN/1.73 M^2
GLUCOSE SERPL-MCNC: 103 MG/DL (ref 70–110)
HCT VFR BLD AUTO: 29.7 % (ref 37–48.5)
HGB BLD-MCNC: 9.9 G/DL (ref 12–16)
IMM GRANULOCYTES # BLD AUTO: 0.02 K/UL (ref 0–0.04)
IMM GRANULOCYTES NFR BLD AUTO: 0.4 % (ref 0–0.5)
LYMPHOCYTES # BLD AUTO: 1.1 K/UL (ref 1–4.8)
LYMPHOCYTES NFR BLD: 21 % (ref 18–48)
MAGNESIUM SERPL-MCNC: 1.6 MG/DL (ref 1.6–2.6)
MCH RBC QN AUTO: 32.6 PG (ref 27–31)
MCHC RBC AUTO-ENTMCNC: 33.3 G/DL (ref 32–36)
MCV RBC AUTO: 98 FL (ref 82–98)
MONOCYTES # BLD AUTO: 0.6 K/UL (ref 0.3–1)
MONOCYTES NFR BLD: 11 % (ref 4–15)
NEUTROPHILS # BLD AUTO: 3.2 K/UL (ref 1.8–7.7)
NEUTROPHILS NFR BLD: 63.8 % (ref 38–73)
NRBC BLD-RTO: 0 /100 WBC
PLATELET # BLD AUTO: 174 K/UL (ref 150–450)
PMV BLD AUTO: 10.1 FL (ref 9.2–12.9)
POCT GLUCOSE: 138 MG/DL (ref 70–110)
POTASSIUM SERPL-SCNC: 3.3 MMOL/L (ref 3.5–5.1)
RBC # BLD AUTO: 3.04 M/UL (ref 4–5.4)
SODIUM SERPL-SCNC: 135 MMOL/L (ref 136–145)
WBC # BLD AUTO: 5 K/UL (ref 3.9–12.7)

## 2024-02-02 PROCEDURE — 36415 COLL VENOUS BLD VENIPUNCTURE: CPT | Performed by: HOSPITALIST

## 2024-02-02 PROCEDURE — 25000003 PHARM REV CODE 250: Performed by: INTERNAL MEDICINE

## 2024-02-02 PROCEDURE — 97165 OT EVAL LOW COMPLEX 30 MIN: CPT

## 2024-02-02 PROCEDURE — 63600175 PHARM REV CODE 636 W HCPCS: Performed by: INTERNAL MEDICINE

## 2024-02-02 PROCEDURE — 85025 COMPLETE CBC W/AUTO DIFF WBC: CPT | Performed by: HOSPITALIST

## 2024-02-02 PROCEDURE — 97530 THERAPEUTIC ACTIVITIES: CPT

## 2024-02-02 PROCEDURE — 83735 ASSAY OF MAGNESIUM: CPT | Performed by: HOSPITALIST

## 2024-02-02 PROCEDURE — 25000003 PHARM REV CODE 250: Performed by: HOSPITALIST

## 2024-02-02 PROCEDURE — 94761 N-INVAS EAR/PLS OXIMETRY MLT: CPT

## 2024-02-02 PROCEDURE — 80048 BASIC METABOLIC PNL TOTAL CA: CPT | Performed by: HOSPITALIST

## 2024-02-02 RX ORDER — POTASSIUM CHLORIDE 20 MEQ/1
40 TABLET, EXTENDED RELEASE ORAL ONCE
Status: COMPLETED | OUTPATIENT
Start: 2024-02-02 | End: 2024-02-02

## 2024-02-02 RX ADMIN — APIXABAN 2.5 MG: 2.5 TABLET, FILM COATED ORAL at 09:02

## 2024-02-02 RX ADMIN — LEVOTHYROXINE SODIUM 125 MCG: 25 TABLET ORAL at 09:02

## 2024-02-02 RX ADMIN — CEFEPIME 1 G: 1 INJECTION, POWDER, FOR SOLUTION INTRAMUSCULAR; INTRAVENOUS at 09:02

## 2024-02-02 RX ADMIN — PANTOPRAZOLE SODIUM 40 MG: 40 TABLET, DELAYED RELEASE ORAL at 09:02

## 2024-02-02 RX ADMIN — LOSARTAN POTASSIUM 25 MG: 25 TABLET, FILM COATED ORAL at 09:02

## 2024-02-02 RX ADMIN — METOPROLOL TARTRATE 25 MG: 25 TABLET, FILM COATED ORAL at 09:02

## 2024-02-02 RX ADMIN — POTASSIUM CHLORIDE 40 MEQ: 1500 TABLET, EXTENDED RELEASE ORAL at 09:02

## 2024-02-02 NOTE — PLAN OF CARE
Problem: Occupational Therapy  Goal: Occupational Therapy Goal  Description: Goals to be met by: 03/02     Patient will increase functional independence with ADLs by performing:    Grooming while seated with Stand-by Assistance.  Toileting from bedside commode with Moderate Assistance for hygiene and clothing management.   Toilet transfer to bedside commode with Minimal Assistance.    Outcome: Ongoing, Progressing   Pt found in SF & agreeable to OT eval/tx this date.  Pt w/o c/o pain & perf the following:  -bridging w/ lateral scoot R w/ CGA  -logrolling R w/ Mod A-->sitting EOB w/ Min A  **noted leak in colostomy & pt returned to supine w/ Min A  Edu/tx re: general safety techs & HEP. Pt verbalized understanding.    Pt presents w/ decreased overall endurance/conditioning, balance/mobility & coordination w/ subsequent decline in (I)/safety w/ BADLs, fxnl mobility & fxnl t/f's.  OT 5x/wk to increase phys/fxnl status & maximize potential to achieve established goals for d/c-->low intensity tx w/ DME deferred.

## 2024-02-02 NOTE — PLAN OF CARE
Problem: Adult Inpatient Plan of Care  Goal: Plan of Care Review  Outcome: Ongoing, Progressing  Goal: Patient-Specific Goal (Individualized)  Outcome: Ongoing, Progressing  Goal: Absence of Hospital-Acquired Illness or Injury  Outcome: Ongoing, Progressing  Goal: Optimal Comfort and Wellbeing  Outcome: Ongoing, Progressing  Goal: Readiness for Transition of Care  Outcome: Ongoing, Progressing     Problem: Fluid Deficit (Intestinal Obstruction)  Goal: Fluid Balance  Outcome: Ongoing, Progressing     Problem: Infection (Intestinal Obstruction)  Goal: Absence of Infection Signs and Symptoms  Outcome: Ongoing, Progressing     Problem: Nausea and Vomiting (Intestinal Obstruction)  Goal: Nausea and Vomiting Relief  Outcome: Ongoing, Progressing     Problem: Pain (Intestinal Obstruction)  Goal: Acceptable Pain Control  Outcome: Ongoing, Progressing     Problem: Hypertension Comorbidity  Goal: Blood Pressure in Desired Range  Outcome: Ongoing, Progressing     Problem: Osteoarthritis Comorbidity  Goal: Maintenance of Osteoarthritis Symptom Control  Outcome: Ongoing, Progressing     Problem: Seizure Disorder Comorbidity  Goal: Maintenance of Seizure Control  Outcome: Ongoing, Progressing     Problem: UTI (Urinary Tract Infection)  Goal: Improved Infection Symptoms  Outcome: Ongoing, Progressing     Problem: Fall Injury Risk  Goal: Absence of Fall and Fall-Related Injury  Outcome: Ongoing, Progressing     Problem: Skin Injury Risk Increased  Goal: Skin Health and Integrity  Outcome: Ongoing, Progressing     Problem: Impaired Wound Healing  Goal: Optimal Wound Healing  Outcome: Ongoing, Progressing     Problem: Infection  Goal: Absence of Infection Signs and Symptoms  Outcome: Ongoing, Progressing     Problem: Fluid and Electrolyte Imbalance (Surgery Nonspecified)  Goal: Fluid and Electrolyte Balance  Outcome: Ongoing, Progressing     Problem: Infection (Surgery Nonspecified)  Goal: Absence of Infection Signs and  Symptoms  Outcome: Ongoing, Progressing     Problem: Pain (Surgery Nonspecified)  Goal: Acceptable Pain Control  Outcome: Ongoing, Progressing

## 2024-02-02 NOTE — PLAN OF CARE
ISRAEL received call from pt's daughter Beatris ([phone]). Beatris was questioning when pt will be discharging. ISRAEL informed Beatris she will contact her as soon as she hears pt will discharge.     ISRAEL sent facility transfer order via [facility] to [facility].     ISRAEL will continue to follow.        02/02/24 9972   Post-Acute Status   Post-Acute Authorization Placement   Discharge Plan   Discharge Plan A Return to nursing home

## 2024-02-02 NOTE — PT/OT/SLP EVAL
Occupational Therapy   Evaluation/tx    Name: Annette Garcia  MRN: [id number]  Admitting Diagnosis: Small bowel obstruction  Recent Surgery: Procedure(s) (LRB):  ROBOTIC LAPAROSCOPY, EXPLORATORY (N/A)  ROBOTIC LYSIS, ADHESIONS (N/A) 2 Days Post-Op    Recommendations:     Discharge Recommendations: Low Intensity Therapy  Discharge Equipment Recommendations:  to be determined by next level of care  Barriers to discharge:  None    Assessment:      Pt presents w/ decreased overall endurance/conditioning, balance/mobility & coordination w/ subsequent decline in (I)/safety w/ BADLs, fxnl mobility & fxnl t/f's.  OT 5x/wk to increase phys/fxnl status & maximize potential to achieve established goals for d/c-->low intensity tx w/ DME deferred.    Annette Garcia is a 88 y.o. female with a medical diagnosis of Small bowel obstruction.  She presents with performance deficits affecting function: weakness, impaired endurance, impaired self care skills, impaired functional mobility, gait instability, impaired balance, decreased coordination.      Rehab Prognosis: Good; patient would benefit from acute skilled OT services to address these deficits and reach maximum level of function.       Plan:     Patient to be seen 5 x/week to address the above listed problems via self-care/home management, therapeutic activities, therapeutic exercises  Plan of Care Expires: 03/02/24  Plan of Care Reviewed with: patient    Subjective     Chief Complaint: n/v  Patient/Family Comments/goals: return home    Occupational Profile:  Living Environment: [facility] resident  Previous level of function: requires varying degrees of Asst w/ all fxnl mobility/tasks  Roles and Routines: WC bound  Equipment Used at Home: wheelchair, walker, rolling, hospital bed  Assistance upon Discharge: NH staff    Pain/Comfort:  Pain Rating 1: 0/10  Pain Rating Post-Intervention 1: 0/10    Patients cultural, spiritual, Moravian conflicts given the current  situation:      Objective:     Communicated with: nsg prior to session.  Patient found HOB elevated with bed alarm, colostomy, peripheral IV, telemetry upon OT entry to room.    General Precautions: Standard, fall  Orthopedic Precautions: N/A  Braces: N/A  Respiratory Status: Room air    Occupational Performance:    Bed Mobility:    Patient completed Scooting/Bridging with contact guard assistance  Patient completed Supine to Sit with moderate assistance  Patient completed Sit to Supine with minimum assistance    Functional Mobility/Transfers:    Functional Mobility:     Activities of Daily Living:      Cognitive/Visual Perceptual:  AO4    No signif deficits noted    Physical Exam:  R shldr to ~90*; elb-->Ds WFL  LUE WFL    Sit balance: F+    AMPAC 6 Click ADL:  AMPAC Total Score: 16    Treatment & Education:   Pt found in SF & agreeable to OT eval/tx this date.  Pt w/o c/o pain & perf the following:  -bridging w/ lateral scoot R w/ CGA  -logrolling R w/ Mod A-->sitting EOB w/ Min A  **noted leak in colostomy & pt returned to supine w/ Min A  Edu/tx re: general safety techs & HEP. Pt verbalized understanding.      Patient left HOB elevated with all lines intact, call button in reach, bed alarm on, and nsg notified    GOALS:   Multidisciplinary Problems       Occupational Therapy Goals          Problem: Occupational Therapy    Goal Priority Disciplines Outcome Interventions   Occupational Therapy Goal     OT, PT/OT Ongoing, Progressing    Description: Goals to be met by: 03/02     Patient will increase functional independence with ADLs by performing:    Grooming while seated with Stand-by Assistance.  Toileting from bedside commode with Moderate Assistance for hygiene and clothing management.   Toilet transfer to bedside commode with Minimal Assistance.                         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         Past Surgical History:   Procedure Laterality Date    APPENDECTOMY      BACK SURGERY  1988    vertebral fracture    BACK SURGERY  02/2013    lumbar L2-5    CATARACT EXTRACTION, BILATERAL Bilateral     CHOLECYSTECTOMY      open    ESOPHAGOGASTRODUODENOSCOPY N/A 2/15/2023    Procedure: EGD (ESOPHAGOGASTRODUODENOSCOPY);  Surgeon: Keith Chavarria MD;  Location: [facility] ENDO;  Service: Endoscopy;  Laterality: N/A;    EYE SURGERY Bilateral     cataract removal with lens implant    HYSTERECTOMY      PERCUTANEOUS TRANSLUMINAL ANGIOPLASTY (PTA) OF PERIPHERAL VESSEL N/A 2/3/2020    Procedure: PTA, PERIPHERAL VESSEL;  Surgeon: Timmy Simmons MD;  Location: [facility] CATH LAB/EP;  Service: Cardiology;  Laterality: N/A;    PORTACATH PLACEMENT Right 09/2016    RENAL ARTERY STENT Left 07/19/2017    ROBOT-ASSISTED EXPLORATORY LAPAROSCOPY N/A 1/31/2024    Procedure: ROBOTIC LAPAROSCOPY, EXPLORATORY;  Surgeon: Chris Johnson MD;  Location: [facility] OR;  Service: General;  Laterality: N/A;    ROBOT-ASSISTED LAPAROSCOPIC REPAIR OF VENTRAL HERNIA N/A 3/23/2023    Procedure: ROBOTIC REPAIR, HERNIA, VENTRAL;  Surgeon: Chris Johnson MD;  Location: [facility] OR;  Service: General;  Laterality: N/A;  Robotic Parastomal hernia repair    ROBOT-ASSISTED LYSIS OF ADHESIONS N/A 1/31/2024    Procedure: ROBOTIC LYSIS, ADHESIONS;  Surgeon: Chris Johnson MD;  Location: [facility] OR;  Service: General;  Laterality: N/A;    SIGMOIDECTOMY  10/29/2019    SMALL INTESTINE SURGERY  08/23/2016    THROMBECTOMY N/A 2/3/2020    Procedure:  THROMBECTOMY;  Surgeon: Timmy Simmons MD;  Location: [facility] CATH LAB/EP;  Service: Cardiology;  Laterality: N/A;    TONSILLECTOMY      VAGINAL HYSTERECTOMY W/ ANTERIOR AND POSTERIOR VAGINAL REPAIR         Time Tracking:     OT Date of Treatment: 02/02/24  OT Start Time: 0938  OT Stop Time: 1002  OT Total Time (min): 24 min    Billable Minutes:Evaluation 10  Therapeutic Activity 14  Total Time 24    2/2/2024

## 2024-02-02 NOTE — SUBJECTIVE & OBJECTIVE
Interval History:   No acute events overnight. Pain well controlled. Ostomy with output. Tolerating CLD without N/V.     Medications:  Continuous Infusions:  Scheduled Meds:   ceFEPime IV (PEDS and ADULTS)  1 g Intravenous Q12H    levothyroxine  125 mcg Oral Daily    losartan  25 mg Oral Daily    metoprolol tartrate  25 mg Oral BID    OXcarbazepine  150 mg Oral Nightly    pantoprazole  40 mg Oral Daily    PHENobarbitaL  97.2 mg Oral QHS    pravastatin  20 mg Oral QHS     PRN Meds:sodium chloride 0.9%, dextrose 10%, dextrose 10%, dextrose 10%, dextrose 10%, glucagon (human recombinant), glucagon (human recombinant), glucose, glucose, HYDROcodone-acetaminophen, influenza 65up-adj, naloxone, ondansetron, sodium chloride 0.9%     Review of patient's allergies indicates:   Allergen Reactions    Adhesive Itching and Blisters    Penicillins Anaphylaxis    Tramadol Hives    Avelox [moxifloxacin] Rash     Facial and arm itching and redness. Pt states throat closes when given.    Amoxil [amoxicillin]     Aspridrox [aspirin, buffered]     Codeine Other (See Comments)     Throat swelling    Keflex [cephalexin]      Tolerated cefepime and cefazolin    Norvasc [amlodipine]     Red dye Hives    Robitussin [guaifenesin]     Sulfa (sulfonamide antibiotics)     Tylenol [acetaminophen]      Has reaction to Tylenol with red dye and unable to take Extra Strength Tylenol/ CAN ONLY TOLERATE REG STRENGTH TYLENOL    Vicks vaporub [camphor-eucalyptus oil-menthol]      Objective:     Vital Signs (Most Recent):  Temp: 97.9 °F (36.6 °C) (02/02/24 0353)  Pulse: 66 (02/02/24 0530)  Resp: 16 (02/02/24 0353)  BP: (!) 146/67 (02/02/24 0353)  SpO2: 95 % (02/02/24 0353) Vital Signs (24h Range):  Temp:  [97.9 °F (36.6 °C)-98.3 °F (36.8 °C)] 97.9 °F (36.6 °C)  Pulse:  [66-78] 66  Resp:  [16-20] 16  SpO2:  [92 %-98 %] 95 %  BP: (124-146)/(59-67) 146/67     Weight: 62.2 kg (137 lb 2 oz)  Body mass index is 29.67 kg/m².    Intake/Output - Last 3 Shifts          01/31 0700  02/01 0659 02/01 0700  02/02 0659 02/02 0700  02/03 0659    P.O.  240     I.V. (mL/kg) 24.6 (0.4)      IV Piggyback 943.2      Total Intake(mL/kg) 967.8 (15.9) 240 (3.9)     Urine (mL/kg/hr) 350 (0.2)      Drains 420      Stool 100 650     Total Output 870 650     Net +97.8 -410                     Physical Exam  Vitals reviewed.   Constitutional:       General: She is not in acute distress.     Appearance: Normal appearance. She is not toxic-appearing.   HENT:      Head: Normocephalic and atraumatic.      Nose: Nose normal.      Mouth/Throat:      Mouth: Mucous membranes are moist.   Cardiovascular:      Rate and Rhythm: Normal rate.      Pulses: Normal pulses.   Pulmonary:      Effort: Pulmonary effort is normal. No respiratory distress.   Abdominal:      General: Abdomen is flat.      Palpations: Abdomen is soft.      Tenderness: There is no guarding or rebound.      Comments: Appropriately TTP  Incisions c/d/i with dermabond overlaying  LLQ ostomy with ostomy appliance overlaying, stool in bag    Skin:     General: Skin is warm.   Neurological:      General: No focal deficit present.      Mental Status: She is alert and oriented to person, place, and time.   Psychiatric:         Mood and Affect: Mood normal.         Behavior: Behavior normal.          Significant Labs:  I have reviewed all pertinent lab results within the past 24 hours.  CBC:   Recent Labs   Lab 02/02/24  0538   WBC 5.00   RBC 3.04*   HGB 9.9*   HCT 29.7*      MCV 98   MCH 32.6*   MCHC 33.3     CMP:   Recent Labs   Lab 01/29/24  0425 01/31/24  0542 02/01/24  0531   *   < > 78   CALCIUM 9.3   < > 8.8   ALBUMIN 3.6  --   --    PROT 6.8  --   --       < > 136   K 3.7   < > 3.9   CO2 28   < > 23   CL 99   < > 98   BUN 14   < > 25*   CREATININE 0.9   < > 0.8   ALKPHOS 83  --   --    ALT 15  --   --    AST 14  --   --    BILITOT 0.3  --   --     < > = values in this interval not displayed.       Significant  Diagnostics:  I have reviewed all pertinent imaging results/findings within the past 24 hours.

## 2024-02-02 NOTE — PLAN OF CARE
VN note: VN completed AVS and attachments and notified bedside nurse, Sherine. Will cont to be available and intervene prn.

## 2024-02-02 NOTE — NURSING
2345- Pt HR noted to be significantly elevated from start of night; -130s and irregular on vitals monitor and manual check. Pt HR ranging 70-80s earlier in shift. Pt denying CP/SOB/palpitations, only expresses frustration on not being able to sleep. Educated pt on importance of prompt assessment for change in status. JESSICA Alonzo NP notified- orders for tele obtained. Pt continuing to deny CP/SOB/palpitations, continues to express frustration,educated pt on importance of cardiac monitoring. Instructed to call for any change in symptoms, v/u. Tele box applied.     0050- Notified by tele tech, pt running 120-130s on telemetry, alarming as afib. Pt still asymptomatic, only wanting to go back to sleep. Dr. Izaguirre notified. No orders received.     
Pt going [facility] to surgery. Family at bedside and aware of POC.  
Pt refuses bg checks MD aware  
Rapid Response Nurse Note     Midline placed to left upper arm. Pt tolerated procedure well. No acute issues at this time. Reviewed plan of care with charge RN, Yasmine Please call Rapid Response RN, Karlee Perry RN with any questions or concerns at [phone].     
VN Note: Labs, notes, orders, care plan review will be available as needed.   
Atrial fibrillation
Patient/Caregiver provided printed discharge information.

## 2024-02-02 NOTE — PLAN OF CARE
Future Appointments   Date Time Provider Department Center   2/21/2024  1:40 PM Chris Johnson MD [facility]

## 2024-02-02 NOTE — PLAN OF CARE
D/c orders noted.     Set up completed for pt to return to [facility]. RN can call report to Violet at [phone] room 115.     ISRAEL spoke with pt's daughter Beartis [phone]. ISRAEL informed Beatris that pt will be discharging today and returning to [facility]. Beatris is agreeable for pt to return to [facility].     ISRAEL spoke with  supervisor and she arranged [facility] to return to [facility] for 12:00p.m.     Pt is cleared to go from  standpoint.     Future Appointments   Date Time Provider Department Center   2/21/2024  1:40 PM [facility]         02/02/24 1056   Final Note   Assessment Type Final Discharge Note   Anticipated Discharge Disposition SNF  ([facility])   What phone number can be called within the next 1-3 days to see how you are doing after discharge? [phone]   Post-Acute Status   Post-Acute Authorization Placement   Post-Acute Placement Status Set-up Complete/Auth obtained   Discharge Delays None known at this time

## 2024-02-02 NOTE — PLAN OF CARE
[facility]     Department of Hospital Medicine     [address] [place] [address]     [phone] after hours  [phone] fax       NURSING HOME ORDERS    02/02/2024    Admit to Nursing Home:  [facility]  Diagnoses:  Active Hospital Problems    Diagnosis  POA    *Small bowel obstruction [K56.609]  Yes    UTI (urinary tract infection) [N39.0]  Yes    Hypertensive urgency [I16.0]  Yes    A-fib [I48.91]  Yes    S/P partial resection of colon [Z90.49]  Not Applicable    Hypothyroidism [E03.9]  Yes    Seizure disorder [G40.909]  Yes     Epilepsy type unknown      Nausea and vomiting [R11.2]  Yes      Resolved Hospital Problems   No resolved problems to display.       Patient is homebound due to:  Small bowel obstruction    Allergies:  Review of patient's allergies indicates:   Allergen Reactions    Adhesive Itching and Blisters    Penicillins Anaphylaxis    Tramadol Hives    Avelox [moxifloxacin] Rash     Facial and arm itching and redness. Pt states throat closes when given.    Amoxil [amoxicillin]     Aspridrox [aspirin, buffered]     Codeine Other (See Comments)     Throat swelling    Keflex [cephalexin]      Tolerated cefepime and cefazolin    Norvasc [amlodipine]     Red dye Hives    Robitussin [guaifenesin]     Sulfa (sulfonamide antibiotics)     Tylenol [acetaminophen]      Has reaction to Tylenol with red dye and unable to take Extra Strength Tylenol/ CAN ONLY TOLERATE REG STRENGTH TYLENOL    Vicks vaporub [camphor-eucalyptus oil-menthol]        Vitals:         Every shift (Skilled Nursing patients)    Diet: Regular   Acitivities:   - Up in a chair each morning as tolerated   - Ambulate with assistance to bathroom   - Scheduled walks once each shift (every 8 hours)     LABS:  Per facility protocol     CMP, CBC each month for 3 months   PT-INR each week for 1 month then monthly   Pre-albumin each month for 3 months    TSH every year     Phenobarbital level  in 1 month and every 3 months  Nursing Precautions:      - Fall precautions per nursing home protocol   - Seizure precaution per skilled nursing protocol   - Decubitus precautions:        -  for positioning   - Pressure reducing foam mattress   - Turn patient every two hours. Use wedge pillows to anchor patient    CONSULTS:  Physical Therapy to evaluate and treat     Occupational Therapy to evaluate and treat       Nutrition to evaluate and recommend diet        MISCELLANEOUS CARE:  (           Colostomy Care:  Empty bag every shift and prn                                             Change and clean site every 48 hours    Midline/PICC  Instructions for Nursing Home:  Perform daily site assessment for redness, swelling, or signs of infection.  Change the transparent dressing every 5 days or as per protocol or when it becomes soiled.  Ensure securement device remains intact and comfortable.  Maintain sterility during any line access.  Use aseptic technique when flushing the line or administering medications.  Monitor for signs of complications, such as pain, fever, or dislodgment.  Educate staff on proper care and signs of line-related issues.  Document all maintenance and assessments accurately.  Notify healthcare provider of any concerns promptly.     Routine Skin for Bedridden Patients:  Apply moisture barrier cream to all    skin folds and wet areas in perineal area daily and after baths and                           all bowel movements.      Medications: Discontinue all previous medication orders, if any. See new list below.     dextrose 5 % in water (D5W) PgBk 100 mL with ceFEPIme 1 gram SolR 1 g  Inject 1 g into the vein every 12 (twelve) hours. for 10 days (STOP DATE 2/12/2024)       Medication List        START taking these medications      dextrose 5 % in water (D5W) PgBk 100 mL with ceFEPIme 1 gram SolR 1 g  Inject 1 g into the vein every 12 (twelve) hours. for 10 days             CONTINUE taking these medications      acetaminophen 325 MG tablet  Commonly known as: TYLENOL  Take 2 tablets (650 mg total) by mouth every 4 (four) hours as needed for Pain.     apixaban 2.5 mg Tab  Commonly known as: ELIQUIS  Take 1 tablet (2.5 mg total) by mouth 2 (two) times daily.     calcium-vitamin D 600 mg-10 mcg (400 unit) Tab  Take 1 tablet by mouth once daily.     docusate sodium 100 MG capsule  Commonly known as: COLACE  Take 1 capsule (100 mg total) by mouth 2 (two) times daily.     furosemide 20 MG tablet  Commonly known as: LASIX  Take 20 mg by mouth once daily.     levothyroxine 125 MCG tablet  Commonly known as: SYNTHROID  TAKE 1 TABLET (125 MCG TOTAL) BY MOUTH ONCE DAILY.     losartan 25 MG tablet  Commonly known as: COZAAR  Take 25 mg by mouth once daily.     magnesium oxide 400 mg (241.3 mg magnesium) tablet  Commonly known as: MAG-OX  Take 2 tablets (800 mg total) by mouth once daily.     metoprolol tartrate 25 MG tablet  Commonly known as: LOPRESSOR  Take 25 mg by mouth 2 (two) times daily.     ondansetron 4 MG tablet  Commonly known as: ZOFRAN  Take 4 mg by mouth every 6 (six) hours as needed for Nausea (vomiting).     OXcarbazepine 150 MG Tab  Commonly known as: TRILEPTAL  Take 150 mg by mouth nightly.     pantoprazole 40 MG tablet  Commonly known as: PROTONIX  Take 40 mg by mouth once daily.     PHENobarbitaL 97.2 MG tablet  Take 97.2 mg by mouth every evening.     polyethylene glycol 17 gram Pwpk  Commonly known as: GLYCOLAX  Take 17 g by mouth once daily.     potassium chloride SA 10 MEQ tablet  Commonly known as: K-DUR,KLOR-CON M  Take 1 tablet (10 mEq total) by mouth once daily.     pravastatin 20 MG tablet  Commonly known as: PRAVACHOL  Take 20 mg by mouth every evening.     vitamin D 1000 units Tab  Commonly known as: VITAMIN D3  Take 1,000 Units by mouth once daily.                    _________________________________  Graham Kapadia MD  02/02/2024

## 2024-02-02 NOTE — PROGRESS NOTES
[facility]  General Surgery  Progress Note    Subjective:     History of Present Illness:  Annette Garcia is a 88 y.o. female with a history of a fib (on eliquis), CAD, HTN, seizure disorder, LLE DVT, May-Thurner Syndrome, osteoporosis, and remote history of colon resection w/ end colostomy c/b parastomal hernia requiring repair (most recently March 2023) who presents with abdominal pain associated with nausea and vomiting. States that she has also noticed decreased colostomy output over the last three days. In the Ed, the patient is afebrile and hypertensive but otherwise hemodynamically stable. An NGT was placed with minimal return. CT A/P w/ distended loops of small bowel and a transition point in the mid abdomen (also viewed on CT from 12/15/23) and mild wall thickening of the distal esophagus w/ small hiatal hernia. Patient was admitted to hospital medicine. General surgery consulted for small bowel obstruction.         Post-Op Info:  Procedure(s) (LRB):  ROBOTIC LAPAROSCOPY, EXPLORATORY (N/A)  ROBOTIC LYSIS, ADHESIONS (N/A)   2 Days Post-Op     Interval History:   No acute events overnight. Pain well controlled. Ostomy with output. Tolerating CLD without N/V.     Medications:  Continuous Infusions:  Scheduled Meds:   ceFEPime IV (PEDS and ADULTS)  1 g Intravenous Q12H    levothyroxine  125 mcg Oral Daily    losartan  25 mg Oral Daily    metoprolol tartrate  25 mg Oral BID    OXcarbazepine  150 mg Oral Nightly    pantoprazole  40 mg Oral Daily    PHENobarbitaL  97.2 mg Oral QHS    pravastatin  20 mg Oral QHS     PRN Meds:sodium chloride 0.9%, dextrose 10%, dextrose 10%, dextrose 10%, dextrose 10%, glucagon (human recombinant), glucagon (human recombinant), glucose, glucose, HYDROcodone-acetaminophen, influenza 65up-adj, naloxone, ondansetron, sodium chloride 0.9%     Review of patient's allergies indicates:   Allergen Reactions    Adhesive Itching and Blisters    Penicillins Anaphylaxis    Tramadol  Hives    Avelox [moxifloxacin] Rash     Facial and arm itching and redness. Pt states throat closes when given.    Amoxil [amoxicillin]     Aspridrox [aspirin, buffered]     Codeine Other (See Comments)     Throat swelling    Keflex [cephalexin]      Tolerated cefepime and cefazolin    Norvasc [amlodipine]     Red dye Hives    Robitussin [guaifenesin]     Sulfa (sulfonamide antibiotics)     Tylenol [acetaminophen]      Has reaction to Tylenol with red dye and unable to take Extra Strength Tylenol/ CAN ONLY TOLERATE REG STRENGTH TYLENOL    Vicks vaporub [camphor-eucalyptus oil-menthol]      Objective:     Vital Signs (Most Recent):  Temp: 97.9 °F (36.6 °C) (02/02/24 0353)  Pulse: 66 (02/02/24 0530)  Resp: 16 (02/02/24 0353)  BP: (!) 146/67 (02/02/24 0353)  SpO2: 95 % (02/02/24 0353) Vital Signs (24h Range):  Temp:  [97.9 °F (36.6 °C)-98.3 °F (36.8 °C)] 97.9 °F (36.6 °C)  Pulse:  [66-78] 66  Resp:  [16-20] 16  SpO2:  [92 %-98 %] 95 %  BP: (124-146)/(59-67) 146/67     Weight: 62.2 kg (137 lb 2 oz)  Body mass index is 29.67 kg/m².    Intake/Output - Last 3 Shifts         01/31 0700  02/01 0659 02/01 0700  02/02 0659 02/02 0700  02/03 0659    P.O.  240     I.V. (mL/kg) 24.6 (0.4)      IV Piggyback 943.2      Total Intake(mL/kg) 967.8 (15.9) 240 (3.9)     Urine (mL/kg/hr) 350 (0.2)      Drains 420      Stool 100 650     Total Output 870 650     Net +97.8 -410                     Physical Exam  Vitals reviewed.   Constitutional:       General: She is not in acute distress.     Appearance: Normal appearance. She is not toxic-appearing.   HENT:      Head: Normocephalic and atraumatic.      Nose: Nose normal.      Mouth/Throat:      Mouth: Mucous membranes are moist.   Cardiovascular:      Rate and Rhythm: Normal rate.      Pulses: Normal pulses.   Pulmonary:      Effort: Pulmonary effort is normal. No respiratory distress.   Abdominal:      General: Abdomen is flat.      Palpations: Abdomen is soft.      Tenderness: There is  no guarding or rebound.      Comments: Appropriately TTP  Incisions c/d/i with dermabond overlaying  LLQ ostomy with ostomy appliance overlaying, stool in bag    Skin:     General: Skin is warm.   Neurological:      General: No focal deficit present.      Mental Status: She is alert and oriented to person, place, and time.   Psychiatric:         Mood and Affect: Mood normal.         Behavior: Behavior normal.          Significant Labs:  I have reviewed all pertinent lab results within the past 24 hours.  CBC:   Recent Labs   Lab 02/02/24  0538   WBC 5.00   RBC 3.04*   HGB 9.9*   HCT 29.7*      MCV 98   MCH 32.6*   MCHC 33.3     CMP:   Recent Labs   Lab 01/29/24  0425 01/31/24  0542 02/01/24  0531   *   < > 78   CALCIUM 9.3   < > 8.8   ALBUMIN 3.6  --   --    PROT 6.8  --   --       < > 136   K 3.7   < > 3.9   CO2 28   < > 23   CL 99   < > 98   BUN 14   < > 25*   CREATININE 0.9   < > 0.8   ALKPHOS 83  --   --    ALT 15  --   --    AST 14  --   --    BILITOT 0.3  --   --     < > = values in this interval not displayed.       Significant Diagnostics:  I have reviewed all pertinent imaging results/findings within the past 24 hours.  Assessment/Plan:     S/P partial resection of colon  88 F with a history of a fib (on eliquis), CAD, HTN, seizure disorder, LLE DVT, May-Thurner Syndrome, osteoporosis, and remote history of colon resection w/ end colostomy c/b parastomal hernia requiring repair (most recently March 2023) who presents with abdominal pain associated with nausea and vomiting. CT A/P with dilated loops of small bowel w/ transition point in the mid abdomen. This appears similar to scan on 12/15/23. Will treat conservatively with NGT decompression and bowel rest. Gastrograffin challenge 1/29 with contrast throughout the colon on 4 and 8 hour films. 950cc of ostomy output yesterday and minimal, thin NGT output. Now s/p diagnostic lap with DILIP. Doing well post operatively.     -- CLD, possibly  advance to regular today will discuss with staff  -- MM pain and nausea control   -- PT/OT  -- can restart eliquis today  -- remainder of care per primary team           Swapna Chan MD  General Surgery  [facility]

## 2024-02-02 NOTE — ASSESSMENT & PLAN NOTE
88 F with a history of a fib (on eliquis), CAD, HTN, seizure disorder, LLE DVT, May-Thurner Syndrome, osteoporosis, and remote history of colon resection w/ end colostomy c/b parastomal hernia requiring repair (most recently March 2023) who presents with abdominal pain associated with nausea and vomiting. CT A/P with dilated loops of small bowel w/ transition point in the mid abdomen. This appears similar to scan on 12/15/23. Will treat conservatively with NGT decompression and bowel rest. Gastrograffin challenge 1/29 with contrast throughout the colon on 4 and 8 hour films. 950cc of ostomy output yesterday and minimal, thin NGT output. Now s/p diagnostic lap with DILIP. Doing well post operatively.     -- CLD, possibly advance to regular today will discuss with staff  -- MM pain and nausea control   -- PT/OT  -- can likely restart eliquis today, will discuss with staff   -- remainder of care per primary team

## 2024-02-03 NOTE — DISCHARGE SUMMARY
[facility]  Discharge Summary      Patient Name: Annette Garcia  MRN: [id number]  STEFANO: [id number]  Patient Class: IP- Inpatient  Admission Date: 1/28/2024  Hospital Length of Stay: 5 days  Discharge Date and Time: 2/2/2024 12:26 PM  Attending Physician: No att. providers found   Discharging Provider: Graham Kapadia MD  Primary Care Provider: [facility]    Primary Care Team: Networked reference to record PCT     HPI:   88 years old female with past medical history significant for HTN, seizure disorder, afib on eliquis, LLE DVT, May-Thurner Syndrome, SBO s/p colostomy, and osteoporosis, who came to ER because of vomiting and abdominal pain since yesterday.  Patient reported she had multiple episodes of nonbloody vomiting since yesterday.  Patient reported she has been having abdominal pain since yesterday which is localized to central abdomen, 6-7/10 in severity and nonradiating.  Patient reported she has history of small-bowel obstruction.  Patient also reported feeling nauseous.  Patient denied any other symptoms including chest pain shortness of breath diarrhea fever chills cough or dizziness. CT A&P showed ''distended loops of small bowel with a transition point in the mid abdomen, at an identical location to prior CT dated 12/15/2023.  Findings are concerning for partial small bowel obstruction. Postoperative changes of partial small and large bowel resection with a left lower quadrant colostomy. Small hiatal hernia with mild wall thickening of the distal esophageal wall and minimal paraesophageal fluid''. Chest xray negative for any acute abnormality. US showed UTI. Patient received IV fluid and general surgery has been consulted in ER.  In the ER, blood pressure was 200/93 mm Hg, patient received IV labetalol 10 mg, most recent blood pressure is 147/74 mm Hg.  I also spoke to patient's daughter at bedside.      Procedure(s) (LRB):  ROBOTIC LAPAROSCOPY, EXPLORATORY  (N/A)  ROBOTIC LYSIS, ADHESIONS (N/A)      Hospital Course:   1/29/274: surgery on board, Gastrografin challenge today     Goals of Care Treatment Preferences:  Code Status: Full Code    Living Will: Yes              Consults:   Consults (From admission, onward)          Status Ordering Provider     Inpatient consult to Infectious Diseases  Once        Provider:  (Not yet assigned)    CLAY Cohen     Inpatient consult to General surgery  Once        Provider:  Chris Johnson MD    Completed HILL GUZMAN            Neuro  Seizure disorder  Resume home meds -trileptal, phenobarb      Cardiac/Vascular  Hypertensive urgency  Patient has a current diagnosis of hypertensive urgency (without evidence of end organ damage) which is controlled.  Latest blood pressure and vitals reviewed-    .   Patient currently off IV antihypertensives.   Home meds for hypertension were reviewed and noted below.   Hypertension Medications               furosemide (LASIX) 20 MG tablet Take 20 mg by mouth once daily.    losartan (COZAAR) 25 MG tablet Take 25 mg by mouth once daily.    metoprolol tartrate (LOPRESSOR) 25 MG tablet Take 25 mg by mouth 2 (two) times daily.            Medication adjustment for hospital antihypertensives is as follows- IV labetalol     Will aim for controlled BP reduction by medications noted above. Monitor and mitigate end organ damage as indicated.    Resume home meds    A-fib  Patient with Persistent (7 days or more) atrial fibrillation which is controlled currently with Beta Blocker. Patient is currently in sinus rhythm.[id number] Score: 4. Anticoagulation indicated. Anticoagulation done with home med eliquis . Holding eliquis per suregry request.    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  Midline order placed  ID consult  Outpatient Urology referral, given recurrent Pseudomonas infection        Endocrine  Hypothyroidism  Continue home med levothyroxine      GI  * Small bowel obstruction  S/p partial resection on 1/31/2024    NG tube discontinued    General Surgery on consult  She tolerated a regular diet well  Pain well managed      S/P partial resection of colon        Nausea and vomiting  resolved        Final Active Diagnoses:    Diagnosis Date Noted POA    PRINCIPAL PROBLEM:  Small bowel obstruction [K56.609] 08/30/2022 Yes    UTI (urinary tract infection) [N39.0] 01/28/2024 Yes    Hypertensive urgency [I16.0] 01/28/2024 Yes    A-fib [I48.91] 12/28/2019 Yes    S/P partial resection of colon [Z90.49] 12/06/2019 Not Applicable    Hypothyroidism [E03.9] 08/18/2016 Yes    Seizure disorder [G40.909] 08/18/2016 Yes    Nausea and vomiting [R11.2] 08/18/2016 Yes      Problems Resolved During this Admission:       Discharged Condition: good    Disposition: Skilled Nursing Facility    Follow Up:   Follow-up Information       Chris Johnson MD Follow up on 3/21/2024.    Specialties: Surgery, General Surgery  Why: at 1:40pm; general surgery hospital follow up appointment  Contact information:  [address]  [address]  [facility] [id number] [phone]                           Patient Instructions:      Ambulatory referral/consult to Urology   Standing Status: Future   Referral Priority: Routine Referral Type: Consultation   Referral Reason: Specialty Services Required   Requested Specialty: Urology   Number of Visits Requested: 1       Significant Diagnostic Studies: Labs: BMP:   Recent Labs   Lab 02/02/24  0538      *   K 3.3*   CL 98   CO2 28   BUN 17   CREATININE 0.6   CALCIUM 8.7   MG 1.6   , CMP   Recent Labs   Lab 02/02/24  0538   *   K 3.3*   CL 98   CO2 28      BUN 17   CREATININE 0.6   CALCIUM 8.7   ANIONGAP 9   , and All labs within the past 24 hours have been  reviewed    Pending Diagnostic Studies:       None           Medications:  Reconciled Home Medications:      Medication List        START taking these medications      dextrose 5 % in water (D5W) PgBk 100 mL with ceFEPIme 1 gram SolR 1 g  Inject 1 g into the vein every 12 (twelve) hours. for 10 days            CONTINUE taking these medications      acetaminophen 325 MG tablet  Commonly known as: TYLENOL  Take 2 tablets (650 mg total) by mouth every 4 (four) hours as needed for Pain.     apixaban 2.5 mg Tab  Commonly known as: ELIQUIS  Take 1 tablet (2.5 mg total) by mouth 2 (two) times daily.     calcium-vitamin D 600 mg-10 mcg (400 unit) Tab  Take 1 tablet by mouth once daily.     docusate sodium 100 MG capsule  Commonly known as: COLACE  Take 1 capsule (100 mg total) by mouth 2 (two) times daily.     furosemide 20 MG tablet  Commonly known as: LASIX  Take 20 mg by mouth once daily.     levothyroxine 125 MCG tablet  Commonly known as: SYNTHROID  TAKE 1 TABLET (125 MCG TOTAL) BY MOUTH ONCE DAILY.     losartan 25 MG tablet  Commonly known as: COZAAR  Take 25 mg by mouth once daily.     magnesium oxide 400 mg (241.3 mg magnesium) tablet  Commonly known as: MAG-OX  Take 2 tablets (800 mg total) by mouth once daily.     metoprolol tartrate 25 MG tablet  Commonly known as: LOPRESSOR  Take 25 mg by mouth 2 (two) times daily.     ondansetron 4 MG tablet  Commonly known as: ZOFRAN  Take 4 mg by mouth every 6 (six) hours as needed for Nausea (vomiting).     OXcarbazepine 150 MG Tab  Commonly known as: TRILEPTAL  Take 150 mg by mouth nightly.     pantoprazole 40 MG tablet  Commonly known as: PROTONIX  Take 40 mg by mouth once daily.     PHENobarbitaL 97.2 MG tablet  Take 97.2 mg by mouth every evening.     polyethylene glycol 17 gram Pwpk  Commonly known as: GLYCOLAX  Take 17 g by mouth once daily.     potassium chloride SA 10 MEQ tablet  Commonly known as: K-DUR,KLOR-CON M  Take 1 tablet (10 mEq total) by mouth once daily.      pravastatin 20 MG tablet  Commonly known as: PRAVACHOL  Take 20 mg by mouth every evening.     vitamin D 1000 units Tab  Commonly known as: VITAMIN D3  Take 1,000 Units by mouth once daily.              Indwelling Lines/Drains at time of discharge:   Lines/Drains/Airways       Drain  Duration                  Colostomy 10/29/19 1200 LLQ 1558 days                    Time spent on the discharge of patient: 35 minutes         Graham Kapadia MD  Department of Hospital Medicine  [facility]

## 2024-02-03 NOTE — ASSESSMENT & PLAN NOTE
S/p partial resection on 1/31/2024    NG tube discontinued    General Surgery on consult  She tolerated a regular diet well  Pain well managed

## 2024-02-03 NOTE — ASSESSMENT & PLAN NOTE
Patient has a current diagnosis of hypertensive urgency (without evidence of end organ damage) which is controlled.  Latest blood pressure and vitals reviewed-    .   Patient currently off IV antihypertensives.   Home meds for hypertension were reviewed and noted below.   Hypertension Medications               furosemide (LASIX) 20 MG tablet Take 20 mg by mouth once daily.    losartan (COZAAR) 25 MG tablet Take 25 mg by mouth once daily.    metoprolol tartrate (LOPRESSOR) 25 MG tablet Take 25 mg by mouth 2 (two) times daily.            Medication adjustment for hospital antihypertensives is as follows- IV labetalol     Will aim for controlled BP reduction by medications noted above. Monitor and mitigate end organ damage as indicated.    Resume home meds

## 2024-02-03 NOTE — ASSESSMENT & PLAN NOTE
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 Midline order placed  ID consult  Outpatient Urology referral, given recurrent Pseudomonas infection

## 2024-02-03 NOTE — ASSESSMENT & PLAN NOTE
Patient with Persistent (7 days or more) atrial fibrillation which is controlled currently with Beta Blocker. Patient is currently in sinus rhythm.[id number] Score: 4. Anticoagulation indicated. Anticoagulation done with home med eliquis . Holding eliquis per suregry request.

## 2024-02-15 NOTE — PROGRESS NOTES
Subjective:       Patient ID: Annette Garcia is a 88 y.o. female.    Chief Complaint: recurrent UTIs     This is a 88 y.o.  female patient that is new to me.  This patient is a nursing home patient and she has limited mobility, uses a wheelchair to get around and needs help wit normal daily activities. The patient was referred to me by hospitalist for recurrent Pseudomonas infection. History of urinary retention. Patient was recently in the hospital for SBO. Discharged on 2/3/24 with orders for IV cefepime for 2 weeks given through midline.   Patient has recurrent UTIs with 2 positive cultures within 6 months.  1/1/24 urine culture positive for Acinetobacter Baumannii/ Haemolyticus  1/28/24 urine culture positive for Pseudomonas Aergunosa.  Ct scan 1/28/24- Kidneys and urinary collecting systems: Normal. No hydronephrosis or urolithiasis. Urinary bladder: No wall thickening.  Today patient reports she is feeling much better, finished IV antibiotics and no longer has midline in left arm. Reports that she is continent but sometimes soils her depends when someone doesn't come fast enough to help her to the commode. Family member is with her and reports her symptoms are normally confusion and irritation, these have been resolved and she is back at her baseline per family member.   PVR after urinating 300cc - >374cc  Urine dipstick- postive for WBC, negative for RBC and nitrites.    Lab Results   Component Value Date    CREATININE 0.6 02/02/2024       ---  PMH/PSH/Medications/Allergies/Social history reviewed and as in chart.    Review of Systems   Constitutional:  Negative for chills and fever.   Respiratory:  Negative for shortness of breath.    Cardiovascular:  Negative for chest pain and palpitations.   Gastrointestinal:  Negative for abdominal pain, constipation and diarrhea.   Genitourinary:  Positive for difficulty urinating (urinates in depends if unable to get to cammode fast enough). Negative for dysuria,  flank pain, frequency, hematuria, pelvic pain, urgency and vaginal pain.   Neurological:  Positive for weakness (uses a wheelchair). Negative for dizziness.   Psychiatric/Behavioral:  Negative for agitation, confusion and sleep disturbance.        Objective:      Physical Exam  HENT:      Head: Normocephalic.   Pulmonary:      Effort: Pulmonary effort is normal.   Genitourinary:     General: Normal vulva.      Vagina: Erythema and tenderness present.      Comments: Vaginal dryness present.  Patient cleaned with betadine and In and Out catheter placed, removed 300cc of urine.  Musculoskeletal:      Cervical back: Normal range of motion.   Neurological:      Mental Status: She is alert and oriented to person, place, and time.      Motor: Weakness present.       Assessment:     Problem Noted   History of Recurrent Utis 2/16/2024   Urinary Retention 11/4/2019    Martinez removed 11/4/19, had 500 cc on bladder scan after several hours  PVR after voiding 300cc >374 on 2/16/24         Plan:     Discussed possibilities of recurrent UTI's with patient and family including atrophic vaginitis, soiling depends, and not emptying bladder completely. Instructed patient to double void when going to the bathroom. Start taking estrace cream 2x per week by applying to vaginal area. Start taking prophylactic macrobid 100mg daily x90 days. Patient voiced understanding.  Sent urine for culture, will message with results.  Follow-up 3 months, documented all instructions for nursing home via paperwork.    DAVID Diaz    Time with patient was 60 minutes. This includes face to face time and non-face to face time preparing to see the patient (eg, review of tests), obtaining and/or reviewing separately obtained history, documenting clinical information in the electronic or other health record, independently interpreting results and communicating results to the patient/family/caregiver, or care coordinator.

## 2024-02-16 ENCOUNTER — OFFICE VISIT (OUTPATIENT)
Dept: UROLOGY | Facility: CLINIC | Age: 89
End: 2024-02-16
Payer: MEDICARE

## 2024-02-16 VITALS — DIASTOLIC BLOOD PRESSURE: 72 MMHG | SYSTOLIC BLOOD PRESSURE: 127 MMHG | HEART RATE: 64 BPM

## 2024-02-16 DIAGNOSIS — Z87.440 HISTORY OF RECURRENT UTIS: Primary | ICD-10-CM

## 2024-02-16 DIAGNOSIS — N30.00 ACUTE CYSTITIS WITHOUT HEMATURIA: ICD-10-CM

## 2024-02-16 DIAGNOSIS — R33.9 URINARY RETENTION: Chronic | ICD-10-CM

## 2024-02-16 LAB
BILIRUB SERPL-MCNC: ABNORMAL MG/DL
BLOOD URINE, POC: ABNORMAL
CLARITY, POC UA: CLEAR
COLOR, POC UA: YELLOW
GLUCOSE UR QL STRIP: ABNORMAL
KETONES UR QL STRIP: ABNORMAL
LEUKOCYTE ESTERASE URINE, POC: ABNORMAL
NITRITE, POC UA: ABNORMAL
PH, POC UA: 8
POC RESIDUAL URINE VOLUME: 374 ML (ref 0–100)
PROTEIN, POC: ABNORMAL
SPECIFIC GRAVITY, POC UA: 1.01
UROBILINOGEN, POC UA: ABNORMAL

## 2024-02-16 PROCEDURE — 99999PBSHW POCT URINE DIPSTICK WITHOUT MICROSCOPE: Mod: PBBFAC,,,

## 2024-02-16 PROCEDURE — 87086 URINE CULTURE/COLONY COUNT: CPT

## 2024-02-16 PROCEDURE — 99999PBSHW POCT BLADDER SCAN: Mod: PBBFAC,,,

## 2024-02-16 PROCEDURE — 99214 OFFICE O/P EST MOD 30 MIN: CPT | Mod: PBBFAC,PO,25

## 2024-02-16 PROCEDURE — 51798 US URINE CAPACITY MEASURE: CPT | Mod: PBBFAC,PO

## 2024-02-16 PROCEDURE — 99999 PR PBB SHADOW E&M-EST. PATIENT-LVL IV: CPT | Mod: PBBFAC,,,

## 2024-02-16 PROCEDURE — 81002 URINALYSIS NONAUTO W/O SCOPE: CPT | Mod: PBBFAC,PO

## 2024-02-16 PROCEDURE — 99205 OFFICE O/P NEW HI 60 MIN: CPT | Mod: S$PBB,,,

## 2024-02-16 RX ORDER — NITROFURANTOIN 25; 75 MG/1; MG/1
100 CAPSULE ORAL DAILY
Qty: 30 CAPSULE | Refills: 2 | Status: ON HOLD | OUTPATIENT
Start: 2024-02-16 | End: 2024-05-13 | Stop reason: HOSPADM

## 2024-02-16 RX ORDER — CIPROFLOXACIN 500 MG/1
250 TABLET ORAL DAILY
Qty: 15 TABLET | Refills: 2 | Status: SHIPPED | OUTPATIENT
Start: 2024-02-16 | End: 2024-02-16 | Stop reason: CLARIF

## 2024-02-16 RX ORDER — IBUPROFEN 100 MG/5ML
1000 SUSPENSION, ORAL (FINAL DOSE FORM) ORAL 2 TIMES DAILY
Qty: 90 TABLET | Refills: 6 | Status: CANCELLED | COMMUNITY
Start: 2024-02-16

## 2024-02-16 RX ORDER — METHENAMINE HIPPURATE 1000 MG/1
1 TABLET ORAL 2 TIMES DAILY
Qty: 90 TABLET | Refills: 6 | Status: CANCELLED | OUTPATIENT
Start: 2024-02-16

## 2024-02-16 RX ORDER — ESTRADIOL 0.1 MG/G
2 CREAM VAGINAL
Qty: 42.5 G | Refills: 2 | Status: SHIPPED | OUTPATIENT
Start: 2024-02-19 | End: 2024-09-29

## 2024-02-16 NOTE — PATIENT INSTRUCTIONS
Double void when using the bathroom.  Take macrobid 100mg daily for 3 months.  Apply estrogen cream 2 times per week to vaginal area.

## 2024-02-17 LAB — BACTERIA UR CULT: NO GROWTH

## 2024-02-23 ENCOUNTER — OFFICE VISIT (OUTPATIENT)
Dept: SURGERY | Facility: CLINIC | Age: 89
End: 2024-02-23
Payer: MEDICARE

## 2024-02-23 VITALS — SYSTOLIC BLOOD PRESSURE: 145 MMHG | HEART RATE: 73 BPM | DIASTOLIC BLOOD PRESSURE: 77 MMHG

## 2024-02-23 DIAGNOSIS — Z98.890 S/P HERNIA REPAIR: Primary | ICD-10-CM

## 2024-02-23 DIAGNOSIS — Z87.19 S/P HERNIA REPAIR: Primary | ICD-10-CM

## 2024-02-23 PROCEDURE — 99999 PR PBB SHADOW E&M-EST. PATIENT-LVL III: CPT | Mod: PBBFAC,,, | Performed by: STUDENT IN AN ORGANIZED HEALTH CARE EDUCATION/TRAINING PROGRAM

## 2024-02-23 PROCEDURE — 99024 POSTOP FOLLOW-UP VISIT: CPT | Mod: POP,,, | Performed by: STUDENT IN AN ORGANIZED HEALTH CARE EDUCATION/TRAINING PROGRAM

## 2024-02-23 PROCEDURE — 99213 OFFICE O/P EST LOW 20 MIN: CPT | Mod: PBBFAC,PN | Performed by: STUDENT IN AN ORGANIZED HEALTH CARE EDUCATION/TRAINING PROGRAM

## 2024-02-23 NOTE — PROGRESS NOTES
S/p dx lap, lysis of adhesion for partial SBO  Feeling well  Eating well  No NV  No crampy pain  Good stool per ostomy  Overall feeling well  RTC prn  Incsiions good

## 2024-03-18 PROBLEM — K92.2 GI BLEED: Status: RESOLVED | Noted: 2023-12-15 | Resolved: 2024-03-18

## 2024-03-26 NOTE — ASSESSMENT & PLAN NOTE
Advance Care Planning     Date: 05/21/2022    Code Status  In light of the patients advanced and life limiting illness,I engaged the the family in a conversation about the patient's preferences for care  at the very end of life. The patient wishes to have a natural, peaceful death.  Along those lines, the patient does not wish to have CPR or other invasive treatments performed when her heart and/or breathing stops. I communicated to the family that a DNR order would be placed in her medical record to reflect this preference.  I spent a total of 20 minutes engaging the patient in this advance care planning discussion.             [Overweight - ( BMI 25.1 - 29.9 )] : overweight -  ( BMI 25.1 - 29.9 ) [TLC diet recommended] : TLC diet recommended [Non - Smoker] : non-smoker [Remove clutter and unsafe carpeting to avoid falls] : remove clutter and unsafe carpeting to avoid falls [Use grab bars] : use grab bars [Smoke/CO Detectors] : smoke/CO detectors [] : colonoscopy [Maintain functional ability] : maintain functional ability

## 2024-04-29 PROBLEM — N39.0 UTI (URINARY TRACT INFECTION): Status: RESOLVED | Noted: 2024-01-28 | Resolved: 2024-04-29

## 2024-05-10 ENCOUNTER — HOSPITAL ENCOUNTER (INPATIENT)
Facility: HOSPITAL | Age: 89
LOS: 2 days | Discharge: HOME | DRG: 690 | End: 2024-05-13
Attending: EMERGENCY MEDICINE | Admitting: FAMILY MEDICINE
Payer: MEDICARE

## 2024-05-10 DIAGNOSIS — Z87.440 HISTORY OF RECURRENT UTIS: ICD-10-CM

## 2024-05-10 DIAGNOSIS — N39.0 COMPLICATED UTI (URINARY TRACT INFECTION): Primary | ICD-10-CM

## 2024-05-10 DIAGNOSIS — R07.9 CHEST PAIN: ICD-10-CM

## 2024-05-10 LAB
ALBUMIN SERPL BCP-MCNC: 3.9 G/DL (ref 3.5–5.2)
ALP SERPL-CCNC: 116 U/L (ref 55–135)
ALT SERPL W/O P-5'-P-CCNC: 19 U/L (ref 10–44)
ANION GAP SERPL CALC-SCNC: 11 MMOL/L (ref 8–16)
AST SERPL-CCNC: 22 U/L (ref 10–40)
BACTERIA #/AREA URNS HPF: ABNORMAL /HPF
BASOPHILS # BLD AUTO: 0.03 K/UL (ref 0–0.2)
BASOPHILS NFR BLD: 0.7 % (ref 0–1.9)
BILIRUB SERPL-MCNC: 0.2 MG/DL (ref 0.1–1)
BILIRUB UR QL STRIP: NEGATIVE
BUN SERPL-MCNC: 15 MG/DL (ref 8–23)
CALCIUM SERPL-MCNC: 9.7 MG/DL (ref 8.7–10.5)
CHLORIDE SERPL-SCNC: 95 MMOL/L (ref 95–110)
CLARITY UR: ABNORMAL
CO2 SERPL-SCNC: 28 MMOL/L (ref 23–29)
COLOR UR: ABNORMAL
CREAT SERPL-MCNC: 0.8 MG/DL (ref 0.5–1.4)
DIFFERENTIAL METHOD BLD: ABNORMAL
EOSINOPHIL # BLD AUTO: 0.3 K/UL (ref 0–0.5)
EOSINOPHIL NFR BLD: 5.7 % (ref 0–8)
ERYTHROCYTE [DISTWIDTH] IN BLOOD BY AUTOMATED COUNT: 13.2 % (ref 11.5–14.5)
EST. GFR  (NO RACE VARIABLE): >60 ML/MIN/1.73 M^2
GLUCOSE SERPL-MCNC: 108 MG/DL (ref 70–110)
GLUCOSE UR QL STRIP: NEGATIVE
HCT VFR BLD AUTO: 33.9 % (ref 37–48.5)
HGB BLD-MCNC: 11.5 G/DL (ref 12–16)
HGB UR QL STRIP: ABNORMAL
HYALINE CASTS #/AREA URNS LPF: 0 /LPF
IMM GRANULOCYTES # BLD AUTO: 0.01 K/UL (ref 0–0.04)
IMM GRANULOCYTES NFR BLD AUTO: 0.2 % (ref 0–0.5)
KETONES UR QL STRIP: NEGATIVE
LACTATE SERPL-SCNC: 2 MMOL/L (ref 0.5–2.2)
LEUKOCYTE ESTERASE UR QL STRIP: ABNORMAL
LYMPHOCYTES # BLD AUTO: 1.2 K/UL (ref 1–4.8)
LYMPHOCYTES NFR BLD: 27 % (ref 18–48)
MCH RBC QN AUTO: 32.8 PG (ref 27–31)
MCHC RBC AUTO-ENTMCNC: 33.9 G/DL (ref 32–36)
MCV RBC AUTO: 97 FL (ref 82–98)
MICROSCOPIC COMMENT: ABNORMAL
MONOCYTES # BLD AUTO: 0.5 K/UL (ref 0.3–1)
MONOCYTES NFR BLD: 11 % (ref 4–15)
NEUTROPHILS # BLD AUTO: 2.5 K/UL (ref 1.8–7.7)
NEUTROPHILS NFR BLD: 55.4 % (ref 38–73)
NITRITE UR QL STRIP: POSITIVE
NRBC BLD-RTO: 0 /100 WBC
PH UR STRIP: 7 [PH] (ref 5–8)
PLATELET # BLD AUTO: 170 K/UL (ref 150–450)
PMV BLD AUTO: 9.7 FL (ref 9.2–12.9)
POTASSIUM SERPL-SCNC: 4.5 MMOL/L (ref 3.5–5.1)
PROT SERPL-MCNC: 7.4 G/DL (ref 6–8.4)
PROT UR QL STRIP: ABNORMAL
RBC # BLD AUTO: 3.51 M/UL (ref 4–5.4)
RBC #/AREA URNS HPF: >100 /HPF (ref 0–4)
SODIUM SERPL-SCNC: 134 MMOL/L (ref 136–145)
SP GR UR STRIP: 1.01 (ref 1–1.03)
SQUAMOUS #/AREA URNS HPF: 4 /HPF
URN SPEC COLLECT METH UR: ABNORMAL
UROBILINOGEN UR STRIP-ACNC: NEGATIVE EU/DL
WBC # BLD AUTO: 4.56 K/UL (ref 3.9–12.7)
WBC #/AREA URNS HPF: >100 /HPF (ref 0–5)
WBC CLUMPS URNS QL MICRO: ABNORMAL

## 2024-05-10 PROCEDURE — 85025 COMPLETE CBC W/AUTO DIFF WBC: CPT | Performed by: EMERGENCY MEDICINE

## 2024-05-10 PROCEDURE — 87186 SC STD MICRODIL/AGAR DIL: CPT | Performed by: EMERGENCY MEDICINE

## 2024-05-10 PROCEDURE — 87040 BLOOD CULTURE FOR BACTERIA: CPT | Performed by: EMERGENCY MEDICINE

## 2024-05-10 PROCEDURE — 87086 URINE CULTURE/COLONY COUNT: CPT | Performed by: EMERGENCY MEDICINE

## 2024-05-10 PROCEDURE — 87077 CULTURE AEROBIC IDENTIFY: CPT | Performed by: EMERGENCY MEDICINE

## 2024-05-10 PROCEDURE — 99285 EMERGENCY DEPT VISIT HI MDM: CPT

## 2024-05-10 PROCEDURE — 63600175 PHARM REV CODE 636 W HCPCS: Performed by: EMERGENCY MEDICINE

## 2024-05-10 PROCEDURE — 83605 ASSAY OF LACTIC ACID: CPT | Performed by: EMERGENCY MEDICINE

## 2024-05-10 PROCEDURE — 80053 COMPREHEN METABOLIC PANEL: CPT | Performed by: EMERGENCY MEDICINE

## 2024-05-10 PROCEDURE — 96365 THER/PROPH/DIAG IV INF INIT: CPT

## 2024-05-10 PROCEDURE — 25000003 PHARM REV CODE 250: Performed by: EMERGENCY MEDICINE

## 2024-05-10 PROCEDURE — 81000 URINALYSIS NONAUTO W/SCOPE: CPT | Performed by: EMERGENCY MEDICINE

## 2024-05-10 PROCEDURE — 87088 URINE BACTERIA CULTURE: CPT | Performed by: EMERGENCY MEDICINE

## 2024-05-10 PROCEDURE — P9612 CATHETERIZE FOR URINE SPEC: HCPCS

## 2024-05-10 PROCEDURE — G0378 HOSPITAL OBSERVATION PER HR: HCPCS

## 2024-05-10 RX ORDER — ONDANSETRON HYDROCHLORIDE 2 MG/ML
4 INJECTION, SOLUTION INTRAVENOUS EVERY 8 HOURS PRN
Status: DISCONTINUED | OUTPATIENT
Start: 2024-05-11 | End: 2024-05-13 | Stop reason: HOSPADM

## 2024-05-10 RX ORDER — SODIUM CHLORIDE 0.9 % (FLUSH) 0.9 %
3 SYRINGE (ML) INJECTION EVERY 12 HOURS PRN
Status: DISCONTINUED | OUTPATIENT
Start: 2024-05-11 | End: 2024-05-13 | Stop reason: HOSPADM

## 2024-05-10 RX ORDER — IBUPROFEN 200 MG
16 TABLET ORAL
Status: DISCONTINUED | OUTPATIENT
Start: 2024-05-11 | End: 2024-05-13 | Stop reason: HOSPADM

## 2024-05-10 RX ORDER — NALOXONE HCL 0.4 MG/ML
0.02 VIAL (ML) INJECTION
Status: DISCONTINUED | OUTPATIENT
Start: 2024-05-11 | End: 2024-05-13 | Stop reason: HOSPADM

## 2024-05-10 RX ORDER — TALC
6 POWDER (GRAM) TOPICAL NIGHTLY PRN
Status: DISCONTINUED | OUTPATIENT
Start: 2024-05-11 | End: 2024-05-13 | Stop reason: HOSPADM

## 2024-05-10 RX ORDER — IBUPROFEN 200 MG
24 TABLET ORAL
Status: DISCONTINUED | OUTPATIENT
Start: 2024-05-11 | End: 2024-05-13 | Stop reason: HOSPADM

## 2024-05-10 RX ORDER — GLUCAGON 1 MG
1 KIT INJECTION
Status: DISCONTINUED | OUTPATIENT
Start: 2024-05-11 | End: 2024-05-13 | Stop reason: HOSPADM

## 2024-05-10 RX ADMIN — CEFTRIAXONE SODIUM 1 G: 1 INJECTION, POWDER, FOR SOLUTION INTRAMUSCULAR; INTRAVENOUS at 11:05

## 2024-05-10 NOTE — ED PROVIDER NOTES
Encounter Date: 5/10/2024       History     Chief Complaint   Patient presents with    Urinary Tract Infection     Pt arrived by [facility] from [facility]. Pt states that she has no complaints at this time. EMS states that facility is sending pt at MD request for UTI, PICC line and IV antibiotic      Annette Garcia is a 88 y.o. 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    The patient presents to the ED due to urinary tract infection.  Patient is currently taking Bactrim and is to finish Bactrim on 05/14 however her urine culture from her nursing home was positive for Pseudomonas which was sensitive to ceftriaxone.  She was referred here for PICC line placement and IV antibiotics..  She denies any fever vomiting chills or additional symptoms.  She denies any other concerns or complaints.        Review of patient's allergies indicates:   Allergen Reactions    Adhesive Itching and Blisters    Penicillins Anaphylaxis    Tramadol Hives    Avelox [moxifloxacin] Rash     Facial and arm itching and redness. Pt states throat closes when given.    Amoxil [amoxicillin]     Aspridrox [aspirin, buffered]     Codeine Other (See Comments)     Throat swelling    Keflex [cephalexin]      Tolerated cefepime and cefazolin    Norvasc [amlodipine]     Red dye Hives    Robitussin [guaifenesin]     Sulfa (sulfonamide antibiotics)     Tylenol [acetaminophen]      Has reaction to Tylenol with red dye and unable to take Extra Strength Tylenol/ CAN ONLY TOLERATE REG STRENGTH TYLENOL    Vicks vaporub [camphor-eucalyptus oil-menthol]      Past Medical  History:   Diagnosis Date    Allergy     Arthritis     arms and legs-osteoarthritis    Cancer     colon    Coronary artery disease 08/14/2020    Digestive disorder     Disorder of kidney and ureter     E coli bacteremia 10/29/2019    Encounter for blood transfusion     Hypertension     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     Past Surgical History:   Procedure Laterality Date    APPENDECTOMY      BACK SURGERY  1988    vertebral fracture    BACK SURGERY  02/2013    lumbar L2-5    CATARACT EXTRACTION, BILATERAL Bilateral     CHOLECYSTECTOMY      open    ESOPHAGOGASTRODUODENOSCOPY N/A 2/15/2023    Procedure: EGD (ESOPHAGOGASTRODUODENOSCOPY);  Surgeon: Keith Chavarria MD;  Location: [facility] ENDO;  Service: Endoscopy;  Laterality: N/A;    EYE SURGERY Bilateral     cataract removal with lens implant    HYSTERECTOMY      PERCUTANEOUS TRANSLUMINAL ANGIOPLASTY (PTA) OF PERIPHERAL VESSEL N/A 2/3/2020    Procedure: PTA, PERIPHERAL VESSEL;  Surgeon: Timmy Simmons MD;  Location: [facility] CATH LAB/EP;  Service: Cardiology;  Laterality: N/A;    PORTACATH PLACEMENT Right 09/2016    RENAL ARTERY STENT Left 07/19/2017    ROBOT-ASSISTED EXPLORATORY LAPAROSCOPY N/A 1/31/2024    Procedure: ROBOTIC LAPAROSCOPY, EXPLORATORY;  Surgeon: Chris Johnson MD;  Location: [facility] OR;  Service: General;  Laterality: N/A;    ROBOT-ASSISTED LAPAROSCOPIC REPAIR OF VENTRAL HERNIA N/A 3/23/2023    Procedure: ROBOTIC REPAIR, HERNIA, VENTRAL;  Surgeon: Chris Johnson MD;  Location: [facility] OR;  Service: General;  Laterality: N/A;  Robotic Parastomal hernia repair    ROBOT-ASSISTED LYSIS OF ADHESIONS N/A 1/31/2024    Procedure: ROBOTIC LYSIS, ADHESIONS;  Surgeon: Chris Johnson MD;  Location: [facility] OR;  Service: General;  Laterality: N/A;     SIGMOIDECTOMY  10/29/2019    SMALL INTESTINE SURGERY  08/23/2016    THROMBECTOMY N/A 2/3/2020    Procedure: THROMBECTOMY;  Surgeon: Timmy Simmons MD;  Location: [facility] CATH LAB/EP;  Service: Cardiology;  Laterality: N/A;    TONSILLECTOMY      VAGINAL HYSTERECTOMY W/ ANTERIOR AND POSTERIOR VAGINAL REPAIR       Family History   Problem Relation Name Age of Onset    Pancreatic cancer Mother      Hypertension Father      Heart attack Father       Social History     Tobacco Use    Smoking status: Never    Smokeless tobacco: Never   Substance Use Topics    Alcohol use: No    Drug use: No     Review of Systems   Constitutional:  Negative for chills and fever.   HENT:  Negative for sore throat.    Respiratory:  Negative for cough and shortness of breath.    Cardiovascular:  Negative for chest pain.   Gastrointestinal:  Negative for nausea and vomiting.   Genitourinary:  Negative for dysuria, frequency and urgency.   Musculoskeletal:  Negative for back pain, neck pain and neck stiffness.   Skin:  Negative for rash and wound.   Neurological:  Negative for syncope and weakness.   Hematological:  Does not bruise/bleed easily.   Psychiatric/Behavioral:  Negative for agitation, behavioral problems and confusion.        Physical Exam     Initial Vitals [05/10/24 1751]   BP Pulse Resp Temp SpO2   (!) 145/62 71 18 98.1 °F (36.7 °C) 96 %      MAP       --         Physical Exam    Constitutional:  Non-toxic appearance. No distress.   HENT:   Head: Normocephalic and atraumatic.   Eyes: Conjunctivae and EOM are normal. Pupils are equal, round, and reactive to light. Right eye exhibits no nystagmus. Left eye exhibits no nystagmus.   Neck: Neck supple.   Cardiovascular:  Regular rhythm, S1 normal and S2 normal.           No murmur heard.  Pulmonary/Chest: Breath sounds normal. No respiratory distress. She has no wheezes. She has no rales.   Abdominal: Abdomen is soft. She exhibits no distension. There is no abdominal tenderness.    Musculoskeletal:      Cervical back: Neck supple.     Neurological: She is alert. No cranial nerve deficit. GCS eye subscore is 4. GCS verbal subscore is 5. GCS motor subscore is 6.   Skin: Skin is warm. No rash noted.   Psychiatric: She has a normal mood and affect. Her behavior is normal.         ED Course   Procedures  Labs Reviewed   CBC W/ AUTO DIFFERENTIAL - Abnormal; Notable for the following components:       Result Value    RBC 3.51 (*)     Hemoglobin 11.5 (*)     Hematocrit 33.9 (*)     MCH 32.8 (*)     All other components within normal limits   COMPREHENSIVE METABOLIC PANEL - Abnormal; Notable for the following components:    Sodium 134 (*)     All other components within normal limits   URINALYSIS, REFLEX TO URINE CULTURE - Abnormal; Notable for the following components:    Color, UA Orange (*)     Appearance, UA Cloudy (*)     Protein, UA 2+ (*)     Occult Blood UA 2+ (*)     Nitrite, UA Positive (*)     Leukocytes, UA 3+ (*)     All other components within normal limits    Narrative:     Specimen Source->Urine   URINALYSIS MICROSCOPIC - Abnormal; Notable for the following components:    RBC, UA >100 (*)     WBC, UA >100 (*)     WBC Clumps, UA Many (*)     Bacteria Many (*)     All other components within normal limits    Narrative:     Specimen Source->Urine   CULTURE, URINE   LACTIC ACID, PLASMA          Imaging Results              X-Ray Chest AP Portable (Final result)  Result time 05/11/24 02:04:33      Final result by Soumya Smith MD (05/11/24 02:04:33)                   Impression:      Please see above.      Electronically signed by: Soumya Smith MD  Date:    05/11/2024  Time:    02:04               Narrative:    EXAMINATION:  XR CHEST AP PORTABLE    CLINICAL HISTORY:  PICC;    TECHNIQUE:  Single frontal view of the chest was performed.    COMPARISON:  01/28/2024    FINDINGS:  Interval placement of a right upper extremity PICC line with tip projecting over the region of the SVC.  The  cardiomediastinal silhouette is within normal limits of size and configuration noting atherosclerosis of the thoracic aorta.  The lungs are symmetrically expanded with diffuse coarse interstitial lung markings, unchanged from prior exam.  No definite evidence of confluent airspace consolidation, substantial volume of pleural fluid or pneumothorax.  Osseous structures are intact with degenerative change.                                       Medications   sodium chloride 0.9% flush 3 mL (has no administration in time range)   melatonin tablet 6 mg (has no administration in time range)   ondansetron injection 4 mg (has no administration in time range)   naloxone 0.4 mg/mL injection 0.02 mg (has no administration in time range)   glucose chewable tablet 16 g (has no administration in time range)   glucose chewable tablet 24 g (has no administration in time range)   glucagon (human recombinant) injection 1 mg (has no administration in time range)   dextrose 10% bolus 125 mL 125 mL (has no administration in time range)   dextrose 10% bolus 250 mL 250 mL (has no administration in time range)   apixaban tablet 2.5 mg (2.5 mg Oral Given 5/11/24 0824)   docusate sodium capsule 100 mg (100 mg Oral Given 5/11/24 0824)   furosemide tablet 20 mg (20 mg Oral Given 5/11/24 0824)   levothyroxine tablet 125 mcg (125 mcg Oral Given 5/11/24 0644)   losartan tablet 25 mg (25 mg Oral Given 5/11/24 0824)   metoprolol tartrate (LOPRESSOR) tablet 25 mg (25 mg Oral Given 5/11/24 0824)   OXcarbazepine tablet 150 mg (150 mg Oral Given 5/11/24 0150)   pantoprazole EC tablet 40 mg (40 mg Oral Given 5/11/24 0824)   PHENobarbitaL tablet 97.2 mg (97.2 mg Oral Given 5/11/24 0150)   polyethylene glycol packet 17 g (17 g Oral Given 5/11/24 0824)   pravastatin tablet 20 mg (20 mg Oral Given 5/11/24 0150)   sodium chloride 0.9% flush 10 mL (10 mLs Intravenous Given 5/11/24 1712)     And   sodium chloride 0.9% flush 10 mL (has no administration in time  range)   tobramycin - pharmacy to dose (has no administration in time range)   tobramycin (NEBCIN) 270 mg in dextrose 5 % (D5W) 100 mL IVPB (has no administration in time range)   cefTRIAXone (Rocephin) 1 g in dextrose 5 % in water (D5W) 100 mL IVPB (MB+) (0 g Intravenous Stopped 5/11/24 0025)     Medical Decision Making  Differential Diagnosis includes, but is not limited to:  CVA/TIA, vertigo, anemia/blood loss, cardiogenic shock, arrhythmia, orthostatic hypotension, dehydration, medication side effect, vitamin deficiency, liver disease, hypothyroidism, drug intoxication/withdrawal, metabolic derangement.      Amount and/or Complexity of Data Reviewed  Labs: ordered. Decision-making details documented in ED Course.  Discussion of management or test interpretation with external provider(s): Discussed patient's allergies with pharmacy, given low cross-reactivity the fact that patient has tolerated cefazolin and cefepime will proceed with administration of ceftriaxone    Risk  Decision regarding hospitalization.  Diagnosis or treatment significantly limited by social determinants of health.  Risk Details: Urine appears consistent with UTI   Multiple attempts for vascular access unsuccessful.  Discussed plan of care with patient and son.  Will plan to facilitate PICC line placement and admit for complicated UTI until blood cultures. She will be admitted to [facility].                ED Course as of 05/11/24 1822   Fri May 10, 2024   2045 Urinalysis, Reflex to Urine Culture Urine, Clean Catch(!) [RN]   2045 Comprehensive metabolic panel(!) [RN]   2045 Urinalysis Microscopic(!) [RN]   2045 CBC auto differential(!) [RN]      ED Course User Index  [RN] Kevin Madsen Jr., MD                           Clinical Impression:  Final diagnoses:  [N39.0] Complicated UTI (urinary tract infection)         Portions of this note were dictated using voice recognition software and may contain dictation related errors in  spelling/grammar/syntax not found on text review            Kevin Madsen Jr., MD  05/11/24 [id number]

## 2024-05-11 LAB
ANION GAP SERPL CALC-SCNC: 8 MMOL/L (ref 8–16)
BASOPHILS # BLD AUTO: 0.02 K/UL (ref 0–0.2)
BASOPHILS NFR BLD: 0.6 % (ref 0–1.9)
BUN SERPL-MCNC: 11 MG/DL (ref 8–23)
CALCIUM SERPL-MCNC: 9.2 MG/DL (ref 8.7–10.5)
CHLORIDE SERPL-SCNC: 99 MMOL/L (ref 95–110)
CO2 SERPL-SCNC: 28 MMOL/L (ref 23–29)
CREAT SERPL-MCNC: 0.7 MG/DL (ref 0.5–1.4)
DIFFERENTIAL METHOD BLD: ABNORMAL
EOSINOPHIL # BLD AUTO: 0.3 K/UL (ref 0–0.5)
EOSINOPHIL NFR BLD: 7.7 % (ref 0–8)
ERYTHROCYTE [DISTWIDTH] IN BLOOD BY AUTOMATED COUNT: 13.2 % (ref 11.5–14.5)
EST. GFR  (NO RACE VARIABLE): >60 ML/MIN/1.73 M^2
GLUCOSE SERPL-MCNC: 89 MG/DL (ref 70–110)
HCT VFR BLD AUTO: 29.7 % (ref 37–48.5)
HGB BLD-MCNC: 10 G/DL (ref 12–16)
IMM GRANULOCYTES # BLD AUTO: 0.01 K/UL (ref 0–0.04)
IMM GRANULOCYTES NFR BLD AUTO: 0.3 % (ref 0–0.5)
LYMPHOCYTES # BLD AUTO: 1.1 K/UL (ref 1–4.8)
LYMPHOCYTES NFR BLD: 32.1 % (ref 18–48)
MCH RBC QN AUTO: 32.5 PG (ref 27–31)
MCHC RBC AUTO-ENTMCNC: 33.7 G/DL (ref 32–36)
MCV RBC AUTO: 96 FL (ref 82–98)
MONOCYTES # BLD AUTO: 0.5 K/UL (ref 0.3–1)
MONOCYTES NFR BLD: 14.8 % (ref 4–15)
NEUTROPHILS # BLD AUTO: 1.6 K/UL (ref 1.8–7.7)
NEUTROPHILS NFR BLD: 44.5 % (ref 38–73)
NRBC BLD-RTO: 0 /100 WBC
PLATELET # BLD AUTO: 145 K/UL (ref 150–450)
PMV BLD AUTO: 9.6 FL (ref 9.2–12.9)
POTASSIUM SERPL-SCNC: 3.8 MMOL/L (ref 3.5–5.1)
RBC # BLD AUTO: 3.08 M/UL (ref 4–5.4)
SODIUM SERPL-SCNC: 135 MMOL/L (ref 136–145)
WBC # BLD AUTO: 3.52 K/UL (ref 3.9–12.7)

## 2024-05-11 PROCEDURE — 25000003 PHARM REV CODE 250: Performed by: HOSPITALIST

## 2024-05-11 PROCEDURE — 11000001 HC ACUTE MED/SURG PRIVATE ROOM

## 2024-05-11 PROCEDURE — 63600175 PHARM REV CODE 636 W HCPCS: Performed by: HOSPITALIST

## 2024-05-11 PROCEDURE — 25000003 PHARM REV CODE 250: Performed by: NURSE PRACTITIONER

## 2024-05-11 PROCEDURE — 63600175 PHARM REV CODE 636 W HCPCS: Performed by: FAMILY MEDICINE

## 2024-05-11 PROCEDURE — 80048 BASIC METABOLIC PNL TOTAL CA: CPT | Performed by: NURSE PRACTITIONER

## 2024-05-11 PROCEDURE — 36569 INSJ PICC 5 YR+ W/O IMAGING: CPT

## 2024-05-11 PROCEDURE — 85025 COMPLETE CBC W/AUTO DIFF WBC: CPT | Performed by: NURSE PRACTITIONER

## 2024-05-11 PROCEDURE — C1751 CATH, INF, PER/CENT/MIDLINE: HCPCS

## 2024-05-11 PROCEDURE — 25000003 PHARM REV CODE 250: Performed by: FAMILY MEDICINE

## 2024-05-11 PROCEDURE — A4216 STERILE WATER/SALINE, 10 ML: HCPCS | Performed by: HOSPITALIST

## 2024-05-11 PROCEDURE — 02HV33Z INSERTION OF INFUSION DEVICE INTO SUPERIOR VENA CAVA, PERCUTANEOUS APPROACH: ICD-10-PCS | Performed by: INTERNAL MEDICINE

## 2024-05-11 RX ORDER — DOCUSATE SODIUM 100 MG/1
100 CAPSULE, LIQUID FILLED ORAL 2 TIMES DAILY
Status: DISCONTINUED | OUTPATIENT
Start: 2024-05-11 | End: 2024-05-13 | Stop reason: HOSPADM

## 2024-05-11 RX ORDER — LEVOTHYROXINE SODIUM 125 UG/1
125 TABLET ORAL
Status: DISCONTINUED | OUTPATIENT
Start: 2024-05-11 | End: 2024-05-13 | Stop reason: HOSPADM

## 2024-05-11 RX ORDER — PRAVASTATIN SODIUM 20 MG/1
20 TABLET ORAL NIGHTLY
Status: DISCONTINUED | OUTPATIENT
Start: 2024-05-11 | End: 2024-05-13 | Stop reason: HOSPADM

## 2024-05-11 RX ORDER — OXCARBAZEPINE 150 MG/1
150 TABLET, FILM COATED ORAL NIGHTLY
Status: DISCONTINUED | OUTPATIENT
Start: 2024-05-11 | End: 2024-05-13 | Stop reason: HOSPADM

## 2024-05-11 RX ORDER — METOPROLOL TARTRATE 25 MG/1
25 TABLET, FILM COATED ORAL 2 TIMES DAILY
Status: DISCONTINUED | OUTPATIENT
Start: 2024-05-11 | End: 2024-05-13 | Stop reason: HOSPADM

## 2024-05-11 RX ORDER — SODIUM CHLORIDE 0.9 % (FLUSH) 0.9 %
10 SYRINGE (ML) INJECTION
Status: DISCONTINUED | OUTPATIENT
Start: 2024-05-11 | End: 2024-05-13 | Stop reason: HOSPADM

## 2024-05-11 RX ORDER — PANTOPRAZOLE SODIUM 40 MG/1
40 TABLET, DELAYED RELEASE ORAL DAILY
Status: DISCONTINUED | OUTPATIENT
Start: 2024-05-11 | End: 2024-05-13 | Stop reason: HOSPADM

## 2024-05-11 RX ORDER — FUROSEMIDE 20 MG/1
20 TABLET ORAL DAILY
Status: DISCONTINUED | OUTPATIENT
Start: 2024-05-11 | End: 2024-05-13 | Stop reason: HOSPADM

## 2024-05-11 RX ORDER — SODIUM CHLORIDE 0.9 % (FLUSH) 0.9 %
10 SYRINGE (ML) INJECTION EVERY 6 HOURS
Status: DISCONTINUED | OUTPATIENT
Start: 2024-05-11 | End: 2024-05-13 | Stop reason: HOSPADM

## 2024-05-11 RX ORDER — POLYETHYLENE GLYCOL 3350 17 G/17G
17 POWDER, FOR SOLUTION ORAL DAILY
Status: DISCONTINUED | OUTPATIENT
Start: 2024-05-11 | End: 2024-05-13 | Stop reason: HOSPADM

## 2024-05-11 RX ORDER — PHENOBARBITAL 32.4 MG/1
97.2 TABLET ORAL NIGHTLY
Status: DISCONTINUED | OUTPATIENT
Start: 2024-05-11 | End: 2024-05-13 | Stop reason: HOSPADM

## 2024-05-11 RX ORDER — LOSARTAN POTASSIUM 25 MG/1
25 TABLET ORAL DAILY
Status: DISCONTINUED | OUTPATIENT
Start: 2024-05-11 | End: 2024-05-13 | Stop reason: HOSPADM

## 2024-05-11 RX ADMIN — PRAVASTATIN SODIUM 20 MG: 20 TABLET ORAL at 10:05

## 2024-05-11 RX ADMIN — METOPROLOL TARTRATE 25 MG: 25 TABLET, FILM COATED ORAL at 08:05

## 2024-05-11 RX ADMIN — PHENOBARBITAL 97.2 MG: 32.4 TABLET ORAL at 10:05

## 2024-05-11 RX ADMIN — Medication 10 ML: at 11:05

## 2024-05-11 RX ADMIN — APIXABAN 2.5 MG: 2.5 TABLET, FILM COATED ORAL at 10:05

## 2024-05-11 RX ADMIN — CEFEPIME 1 G: 1 INJECTION, POWDER, FOR SOLUTION INTRAMUSCULAR; INTRAVENOUS at 08:05

## 2024-05-11 RX ADMIN — PRAVASTATIN SODIUM 20 MG: 20 TABLET ORAL at 01:05

## 2024-05-11 RX ADMIN — FUROSEMIDE 20 MG: 20 TABLET ORAL at 08:05

## 2024-05-11 RX ADMIN — POLYETHYLENE GLYCOL 3350 17 G: 17 POWDER, FOR SOLUTION ORAL at 08:05

## 2024-05-11 RX ADMIN — TOBRAMYCIN 270 MG: 40 INJECTION INTRAMUSCULAR; INTRAVENOUS at 10:05

## 2024-05-11 RX ADMIN — PHENOBARBITAL 97.2 MG: 32.4 TABLET ORAL at 01:05

## 2024-05-11 RX ADMIN — OXCARBAZEPINE 150 MG: 150 TABLET, FILM COATED ORAL at 01:05

## 2024-05-11 RX ADMIN — DOCUSATE SODIUM 100 MG: 100 CAPSULE, LIQUID FILLED ORAL at 08:05

## 2024-05-11 RX ADMIN — METOPROLOL TARTRATE 25 MG: 25 TABLET, FILM COATED ORAL at 10:05

## 2024-05-11 RX ADMIN — APIXABAN 2.5 MG: 2.5 TABLET, FILM COATED ORAL at 08:05

## 2024-05-11 RX ADMIN — Medication 10 ML: at 06:05

## 2024-05-11 RX ADMIN — DOCUSATE SODIUM 100 MG: 100 CAPSULE, LIQUID FILLED ORAL at 10:05

## 2024-05-11 RX ADMIN — Medication 10 ML: at 05:05

## 2024-05-11 RX ADMIN — PANTOPRAZOLE SODIUM 40 MG: 40 TABLET, DELAYED RELEASE ORAL at 08:05

## 2024-05-11 RX ADMIN — LOSARTAN POTASSIUM 25 MG: 25 TABLET, FILM COATED ORAL at 08:05

## 2024-05-11 RX ADMIN — OXCARBAZEPINE 150 MG: 150 TABLET, FILM COATED ORAL at 10:05

## 2024-05-11 RX ADMIN — LEVOTHYROXINE SODIUM 125 MCG: 125 TABLET ORAL at 06:05

## 2024-05-11 NOTE — PROGRESS NOTES
[facility] ID note    In reviewing UCX from nursing home (see media tab), pt could get 1-2 doses of tobramycin 5mg/kg for treatment especially since it is likely cystitis at most.    No need for cefepime then.    Please call if any questions   Mark Valle  [employer] ID  [phone]

## 2024-05-11 NOTE — SUBJECTIVE & OBJECTIVE
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       Past Surgical History:   Procedure Laterality Date    APPENDECTOMY      BACK SURGERY  1988    vertebral fracture    BACK SURGERY  02/2013    lumbar L2-5    CATARACT EXTRACTION, BILATERAL Bilateral     CHOLECYSTECTOMY      open    ESOPHAGOGASTRODUODENOSCOPY N/A 2/15/2023    Procedure: EGD (ESOPHAGOGASTRODUODENOSCOPY);  Surgeon: Keith Chavarria MD;  Location: [facility] ENDO;  Service: Endoscopy;  Laterality: N/A;    EYE SURGERY Bilateral     cataract removal with lens implant    HYSTERECTOMY      PERCUTANEOUS TRANSLUMINAL ANGIOPLASTY (PTA) OF PERIPHERAL VESSEL N/A 2/3/2020    Procedure: PTA, PERIPHERAL VESSEL;  Surgeon: Timmy Simmons MD;  Location: [facility] CATH LAB/EP;  Service: Cardiology;  Laterality: N/A;    PORTACATH PLACEMENT Right 09/2016    RENAL ARTERY STENT Left 07/19/2017    ROBOT-ASSISTED EXPLORATORY LAPAROSCOPY N/A 1/31/2024    Procedure: ROBOTIC LAPAROSCOPY, EXPLORATORY;  Surgeon: Chris Johnson MD;  Location: [facility] OR;  Service: General;  Laterality: N/A;    ROBOT-ASSISTED LAPAROSCOPIC REPAIR OF VENTRAL HERNIA N/A 3/23/2023    Procedure: ROBOTIC REPAIR, HERNIA, VENTRAL;  Surgeon: Chris Johnson MD;  Location: [facility] OR;  Service: General;  Laterality: N/A;  Robotic Parastomal hernia repair    ROBOT-ASSISTED LYSIS OF ADHESIONS N/A 1/31/2024    Procedure: ROBOTIC LYSIS, ADHESIONS;  Surgeon: Chris Johnson MD;  Location: [facility] OR;  Service: General;  Laterality:  N/A;    SIGMOIDECTOMY  10/29/2019    SMALL INTESTINE SURGERY  08/23/2016    THROMBECTOMY N/A 2/3/2020    Procedure: THROMBECTOMY;  Surgeon: Timmy Simmons MD;  Location: [facility] CATH LAB/EP;  Service: Cardiology;  Laterality: N/A;    TONSILLECTOMY      VAGINAL HYSTERECTOMY W/ ANTERIOR AND POSTERIOR VAGINAL REPAIR         Review of patient's allergies indicates:   Allergen Reactions    Adhesive Itching and Blisters    Penicillins Anaphylaxis    Tramadol Hives    Avelox [moxifloxacin] Rash     Facial and arm itching and redness. Pt states throat closes when given.    Amoxil [amoxicillin]     Aspridrox [aspirin, buffered]     Codeine Other (See Comments)     Throat swelling    Keflex [cephalexin]      Tolerated cefepime and cefazolin    Norvasc [amlodipine]     Red dye Hives    Robitussin [guaifenesin]     Sulfa (sulfonamide antibiotics)     Tylenol [acetaminophen]      Has reaction to Tylenol with red dye and unable to take Extra Strength Tylenol/ CAN ONLY TOLERATE REG STRENGTH TYLENOL    Vicks vaporub [camphor-eucalyptus oil-menthol]        No current facility-administered medications on file prior to encounter.     Current Outpatient Medications on File Prior to Encounter   Medication Sig    acetaminophen (TYLENOL) 325 MG tablet Take 2 tablets (650 mg total) by mouth every 4 (four) hours as needed for Pain.    apixaban (ELIQUIS) 2.5 mg Tab Take 1 tablet (2.5 mg total) by mouth 2 (two) times daily.    calcium-vitamin D 600 mg(1,500mg) -400 unit Tab Take 1 tablet by mouth once daily.    docusate sodium (COLACE) 100 MG capsule Take 1 capsule (100 mg total) by mouth 2 (two) times daily.    estradioL (ESTRACE) 0.01 % (0.1 mg/gram) vaginal cream Place 2 g vaginally twice a week.    furosemide (LASIX) 20 MG tablet Take 20 mg by mouth once daily.    levothyroxine (SYNTHROID) 125 MCG tablet TAKE 1 TABLET (125 MCG TOTAL) BY MOUTH ONCE DAILY.    losartan (COZAAR) 25 MG tablet Take 25 mg by mouth once daily.    magnesium oxide  (MAG-OX) 400 mg (241.3 mg magnesium) tablet Take 2 tablets (800 mg total) by mouth once daily.    metoprolol tartrate (LOPRESSOR) 25 MG tablet Take 25 mg by mouth 2 (two) times daily.    nitrofurantoin, macrocrystal-monohydrate, (MACROBID) 100 MG capsule Take 1 capsule (100 mg total) by mouth Daily.    OXcarbazepine (TRILEPTAL) 150 MG Tab Take 150 mg by mouth nightly.    pantoprazole (PROTONIX) 40 MG tablet Take 40 mg by mouth once daily.    PHENobarbitaL 97.2 MG tablet Take 97.2 mg by mouth every evening.    polyethylene glycol (GLYCOLAX) 17 gram PwPk Take 17 g by mouth once daily.    pravastatin (PRAVACHOL) 20 MG tablet Take 20 mg by mouth every evening.    vitamin D (VITAMIN D3) 1000 units Tab Take 1,000 Units by mouth once daily.    ondansetron (ZOFRAN) 4 MG tablet Take 4 mg by mouth every 6 (six) hours as needed for Nausea (vomiting).     Family History       Problem Relation (Age of Onset)    Heart attack Father    Hypertension Father    Pancreatic cancer Mother          Tobacco Use    Smoking status: Never    Smokeless tobacco: Never   Substance and Sexual Activity    Alcohol use: No    Drug use: No    Sexual activity: Not Currently     Partners: Male     Review of Systems   Constitutional:  Negative for chills and fever.   HENT:  Negative for congestion.    Eyes:  Negative for visual disturbance.   Respiratory:  Negative for shortness of breath.    Cardiovascular:  Negative for chest pain.   Gastrointestinal:  Negative for abdominal pain.   Genitourinary:  Negative for dysuria.        Incontinence   Musculoskeletal:  Positive for gait problem (WC bound).   Skin:  Negative for color change.   Neurological:  Negative for dizziness, syncope and light-headedness.   Psychiatric/Behavioral:  Negative for agitation. The patient is not nervous/anxious.      Objective:     Vital Signs (Most Recent):  Temp: 98.1 °F (36.7 °C) (05/10/24 1751)  Pulse: 72 (05/10/24 1900)  Resp: 18 (05/10/24 1751)  BP: (!) 170/68 (05/10/24  1827)  SpO2: 97 % (05/10/24 1900) Vital Signs (24h Range):  Temp:  [98.1 °F (36.7 °C)] 98.1 °F (36.7 °C)  Pulse:  [71-72] 72  Resp:  [18] 18  SpO2:  [96 %-97 %] 97 %  BP: (145-170)/(62-68) 170/68     Weight: 62.6 kg (138 lb)  Body mass index is 29.86 kg/m².     Physical Exam  Constitutional:       General: She is not in acute distress.  HENT:      Head: Normocephalic and atraumatic.      Mouth/Throat:      Mouth: Mucous membranes are moist.   Eyes:      Extraocular Movements: Extraocular movements intact.      Pupils: Pupils are equal, round, and reactive to light.   Cardiovascular:      Rate and Rhythm: Normal rate and regular rhythm.      Pulses: Normal pulses.      Heart sounds: Normal heart sounds.   Pulmonary:      Effort: Pulmonary effort is normal. No respiratory distress.      Breath sounds: Normal breath sounds. No wheezing, rhonchi or rales.   Abdominal:      General: Bowel sounds are normal. There is no distension.      Palpations: Abdomen is soft.      Tenderness: There is no abdominal tenderness. There is no guarding or rebound.      Comments: Colostomy site CDI   Musculoskeletal:      Cervical back: No rigidity.      Right lower leg: No edema.      Left lower leg: No edema.   Skin:     General: Skin is warm.   Neurological:      Mental Status: She is alert and oriented to person, place, and time.      Comments: forgetful   Psychiatric:         Mood and Affect: Mood normal.         Behavior: Behavior normal.         Thought Content: Thought content normal.         Judgment: Judgment normal.              CRANIAL NERVES     CN III, IV, VI   Pupils are equal, round, and reactive to light.       Significant Labs: All pertinent labs within the past 24 hours have been reviewed.    Significant Imaging: I have reviewed all pertinent imaging results/findings within the past 24 hours.

## 2024-05-11 NOTE — ASSESSMENT & PLAN NOTE
Patient with known CAD s/p stent placement, which is controlled Will continue Statin and monitor for S/Sx of angina/ACS. Continue to monitor on telemetry.

## 2024-05-11 NOTE — H&P
[facility] Medicine  History & Physical    Patient Name: Annette Garcia  MRN: [id number]  Patient Class: OP- Observation  Admission Date: 5/10/2024  Attending Physician: Graham Kapadia MD   Primary Care Provider: [facility]         Patient information was obtained from patient, nursing home, past medical records, and ER records.     Subjective:     Principal Problem:Complicated UTI (urinary tract infection)    Chief Complaint:   Chief Complaint   Patient presents with    Urinary Tract Infection     Pt arrived by [facility] from [facility]. Pt states that she has no complaints at this time. EMS states that facility is sending pt at MD request for UTI, PICC line and IV antibiotic         HPI: Annette Garcia is an 88 year old female w/ PMH seizure disorder, hypothyroidism, HTN, CAD, chronic anticoagulation, DLBCL, and anemia. She was sent into the ED from her nursing home for a urine cx positive for pseudomonas. She has been taking Bactrim and was due to finish that course on 5/14. She denies any fever, chills, N/V, abd pain, flank pain, or any urinary sx except incontinence.    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       Past Surgical History:   Procedure Laterality Date    APPENDECTOMY      BACK SURGERY  1988    vertebral fracture    BACK SURGERY  02/2013    lumbar L2-5    CATARACT EXTRACTION, BILATERAL Bilateral     CHOLECYSTECTOMY      open    ESOPHAGOGASTRODUODENOSCOPY N/A  2/15/2023    Procedure: EGD (ESOPHAGOGASTRODUODENOSCOPY);  Surgeon: Keith Chavarria MD;  Location: [facility] ENDO;  Service: Endoscopy;  Laterality: N/A;    EYE SURGERY Bilateral     cataract removal with lens implant    HYSTERECTOMY      PERCUTANEOUS TRANSLUMINAL ANGIOPLASTY (PTA) OF PERIPHERAL VESSEL N/A 2/3/2020    Procedure: PTA, PERIPHERAL VESSEL;  Surgeon: Timmy Simmons MD;  Location: [facility] CATH LAB/EP;  Service: Cardiology;  Laterality: N/A;    PORTACATH PLACEMENT Right 09/2016    RENAL ARTERY STENT Left 07/19/2017    ROBOT-ASSISTED EXPLORATORY LAPAROSCOPY N/A 1/31/2024    Procedure: ROBOTIC LAPAROSCOPY, EXPLORATORY;  Surgeon: Chris Johnson MD;  Location: [facility] OR;  Service: General;  Laterality: N/A;    ROBOT-ASSISTED LAPAROSCOPIC REPAIR OF VENTRAL HERNIA N/A 3/23/2023    Procedure: ROBOTIC REPAIR, HERNIA, VENTRAL;  Surgeon: Chris Johnson MD;  Location: [facility] OR;  Service: General;  Laterality: N/A;  Robotic Parastomal hernia repair    ROBOT-ASSISTED LYSIS OF ADHESIONS N/A 1/31/2024    Procedure: ROBOTIC LYSIS, ADHESIONS;  Surgeon: Chris Johnson MD;  Location: [facility] OR;  Service: General;  Laterality: N/A;    SIGMOIDECTOMY  10/29/2019    SMALL INTESTINE SURGERY  08/23/2016    THROMBECTOMY N/A 2/3/2020    Procedure: THROMBECTOMY;  Surgeon: Timmy Simmons MD;  Location: [facility] CATH LAB/EP;  Service: Cardiology;  Laterality: N/A;    TONSILLECTOMY      VAGINAL HYSTERECTOMY W/ ANTERIOR AND POSTERIOR VAGINAL REPAIR         Review of patient's allergies indicates:   Allergen Reactions    Adhesive Itching and Blisters    Penicillins Anaphylaxis    Tramadol Hives    Avelox [moxifloxacin] Rash     Facial and arm itching and redness. Pt states throat closes when given.    Amoxil [amoxicillin]     Aspridrox [aspirin, buffered]     Codeine Other (See Comments)     Throat swelling    Keflex [cephalexin]      Tolerated cefepime and cefazolin    Norvasc [amlodipine]     Red dye Hives    Robitussin  [guaifenesin]     Sulfa (sulfonamide antibiotics)     Tylenol [acetaminophen]      Has reaction to Tylenol with red dye and unable to take Extra Strength Tylenol/ CAN ONLY TOLERATE REG STRENGTH TYLENOL    Vicks vaporub [camphor-eucalyptus oil-menthol]        No current facility-administered medications on file prior to encounter.     Current Outpatient Medications on File Prior to Encounter   Medication Sig    acetaminophen (TYLENOL) 325 MG tablet Take 2 tablets (650 mg total) by mouth every 4 (four) hours as needed for Pain.    apixaban (ELIQUIS) 2.5 mg Tab Take 1 tablet (2.5 mg total) by mouth 2 (two) times daily.    calcium-vitamin D 600 mg(1,500mg) -400 unit Tab Take 1 tablet by mouth once daily.    docusate sodium (COLACE) 100 MG capsule Take 1 capsule (100 mg total) by mouth 2 (two) times daily.    estradioL (ESTRACE) 0.01 % (0.1 mg/gram) vaginal cream Place 2 g vaginally twice a week.    furosemide (LASIX) 20 MG tablet Take 20 mg by mouth once daily.    levothyroxine (SYNTHROID) 125 MCG tablet TAKE 1 TABLET (125 MCG TOTAL) BY MOUTH ONCE DAILY.    losartan (COZAAR) 25 MG tablet Take 25 mg by mouth once daily.    magnesium oxide (MAG-OX) 400 mg (241.3 mg magnesium) tablet Take 2 tablets (800 mg total) by mouth once daily.    metoprolol tartrate (LOPRESSOR) 25 MG tablet Take 25 mg by mouth 2 (two) times daily.    nitrofurantoin, macrocrystal-monohydrate, (MACROBID) 100 MG capsule Take 1 capsule (100 mg total) by mouth Daily.    OXcarbazepine (TRILEPTAL) 150 MG Tab Take 150 mg by mouth nightly.    pantoprazole (PROTONIX) 40 MG tablet Take 40 mg by mouth once daily.    PHENobarbitaL 97.2 MG tablet Take 97.2 mg by mouth every evening.    polyethylene glycol (GLYCOLAX) 17 gram PwPk Take 17 g by mouth once daily.    pravastatin (PRAVACHOL) 20 MG tablet Take 20 mg by mouth every evening.    vitamin D (VITAMIN D3) 1000 units Tab Take 1,000 Units by mouth once daily.    ondansetron (ZOFRAN) 4 MG tablet Take 4 mg by  mouth every 6 (six) hours as needed for Nausea (vomiting).     Family History       Problem Relation (Age of Onset)    Heart attack Father    Hypertension Father    Pancreatic cancer Mother          Tobacco Use    Smoking status: Never    Smokeless tobacco: Never   Substance and Sexual Activity    Alcohol use: No    Drug use: No    Sexual activity: Not Currently     Partners: Male     Review of Systems   Constitutional:  Negative for chills and fever.   HENT:  Negative for congestion.    Eyes:  Negative for visual disturbance.   Respiratory:  Negative for shortness of breath.    Cardiovascular:  Negative for chest pain.   Gastrointestinal:  Negative for abdominal pain.   Genitourinary:  Negative for dysuria.        Incontinence   Musculoskeletal:  Positive for gait problem (WC bound).   Skin:  Negative for color change.   Neurological:  Negative for dizziness, syncope and light-headedness.   Psychiatric/Behavioral:  Negative for agitation. The patient is not nervous/anxious.      Objective:     Vital Signs (Most Recent):  Temp: 98.1 °F (36.7 °C) (05/10/24 1751)  Pulse: 72 (05/10/24 1900)  Resp: 18 (05/10/24 1751)  BP: (!) 170/68 (05/10/24 1827)  SpO2: 97 % (05/10/24 1900) Vital Signs (24h Range):  Temp:  [98.1 °F (36.7 °C)] 98.1 °F (36.7 °C)  Pulse:  [71-72] 72  Resp:  [18] 18  SpO2:  [96 %-97 %] 97 %  BP: (145-170)/(62-68) 170/68     Weight: 62.6 kg (138 lb)  Body mass index is 29.86 kg/m².     Physical Exam  Constitutional:       General: She is not in acute distress.  HENT:      Head: Normocephalic and atraumatic.      Mouth/Throat:      Mouth: Mucous membranes are moist.   Eyes:      Extraocular Movements: Extraocular movements intact.      Pupils: Pupils are equal, round, and reactive to light.   Cardiovascular:      Rate and Rhythm: Normal rate and regular rhythm.      Pulses: Normal pulses.      Heart sounds: Normal heart sounds.   Pulmonary:      Effort: Pulmonary effort is normal. No respiratory distress.       Breath sounds: Normal breath sounds. No wheezing, rhonchi or rales.   Abdominal:      General: Bowel sounds are normal. There is no distension.      Palpations: Abdomen is soft.      Tenderness: There is no abdominal tenderness. There is no guarding or rebound.      Comments: Colostomy site CDI   Musculoskeletal:      Cervical back: No rigidity.      Right lower leg: No edema.      Left lower leg: No edema.   Skin:     General: Skin is warm.   Neurological:      Mental Status: She is alert and oriented to person, place, and time.      Comments: forgetful   Psychiatric:         Mood and Affect: Mood normal.         Behavior: Behavior normal.         Thought Content: Thought content normal.         Judgment: Judgment normal.              CRANIAL NERVES     CN III, IV, VI   Pupils are equal, round, and reactive to light.       Significant Labs: All pertinent labs within the past 24 hours have been reviewed.    Significant Imaging: I have reviewed all pertinent imaging results/findings within the past 24 hours.  Assessment/Plan:     * Complicated UTI (urinary tract infection)  Urine cx positive for pseudomonas  Sensitive to rocephin  Allergy listed to PCN and keflex but patient has tolerated cephalosporins in the past  VERY poor vasculature so PICC line ordered to be placed in ED to start rocephin  Dose not given yet, awaiting PICC  Bactrim started 5/7 (initially scheduled to end 5/14)      Chronic anticoagulation  Eliquis      Coronary artery disease  Patient with known CAD s/p stent placement, which is controlled Will continue Statin and monitor for S/Sx of angina/ACS. Continue to monitor on telemetry.     Hypertension  Chronic, controlled. Latest blood pressure and vitals reviewed-     Temp:  [98.1 °F (36.7 °C)]   Pulse:  [71-72]   Resp:  [18]   BP: (145-170)/(62-68)   SpO2:  [96 %-97 %] .   Home meds for hypertension were reviewed and noted below.   Hypertension Medications               furosemide (LASIX) 20  MG tablet Take 20 mg by mouth once daily.    losartan (COZAAR) 25 MG tablet Take 25 mg by mouth once daily.    metoprolol tartrate (LOPRESSOR) 25 MG tablet Take 25 mg by mouth 2 (two) times daily.            While in the hospital, will manage blood pressure as follows; Continue home antihypertensive regimen    Will utilize p.r.n. blood pressure medication only if patient's blood pressure greater than 180/110 and she develops symptoms such as worsening chest pain or shortness of breath.    Hypothyroidism  Continue synthroid      Seizure disorder  Continue trileptal and phenobarbital  Seizure precautions        VTE Risk Mitigation (From admission, onward)           Ordered     apixaban tablet 2.5 mg  2 times daily         05/11/24 0010     Reason for No Pharmacological VTE Prophylaxis  Once        Question:  Reasons:  Answer:  Already adequately anticoagulated on oral Anticoagulants    05/10/24 2311     IP VTE HIGH RISK PATIENT  Once         05/10/24 2311     Place sequential compression device  Until discontinued         05/10/24 2311                         On 05/11/2024, patient should be placed in hospital observation services under my care in collaboration with Graham Kapadia MD.           Cata Alonzo NP  Department of Hospital Medicine  [place] [facility]

## 2024-05-11 NOTE — ASSESSMENT & PLAN NOTE
Patient's anemia is currently controlled. Has not received any PRBCs to date. 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

## 2024-05-11 NOTE — PROCEDURES
"Annette Garcia is a 88 y.o. female patient.    Temp: 98.7 °F (37.1 °C) (05/11/24 0101)  Pulse: 70 (05/11/24 0101)  Resp: 18 (05/11/24 0101)  BP: 138/61 (05/11/24 0101)  SpO2: 98 % (05/11/24 0101)  Weight: 66 kg (145 lb 8.1 oz) (05/11/24 0101)  Height: 4' 9" (144.8 cm) (05/10/24 2013)    PICC  Date/Time: 5/11/2024 12:25 AM  Performed by: Moose Jimenez RN  Consent Done: Yes  Time out: Immediately prior to procedure a time out was called to verify the correct patient, procedure, equipment, support staff and site/side marked as required  Indications: med administration and vascular access  Anesthesia: local infiltration  Local anesthetic: lidocaine 1% without epinephrine  Anesthetic Total (mL): 2  Preparation: skin prepped with ChloraPrep  Skin prep agent dried: skin prep agent completely dried prior to procedure  Sterile barriers: all five maximum sterile barriers used - cap, mask, sterile gown, sterile gloves, and large sterile sheet  Hand hygiene: hand hygiene performed prior to central venous catheter insertion  Location details: right basilic  Catheter type: triple lumen  Catheter size: 4 Fr  Catheter Length: 32cm    Ultrasound guidance: yes  Vessel Caliber: medium and patent, compressibility normal  Vascular Doppler: not done  Needle advanced into vessel with real time Ultrasound guidance.  Guidewire confirmed in vessel.  Sterile sheath used.  no esophageal manometryNumber of attempts: 1  Post-procedure: blood return through all ports, chlorhexidine patch and sterile dressing applied            Name NAM ARRIOLA  5/11/2024    "

## 2024-05-11 NOTE — ED NOTES
Pt presents to ED from [facility] for picc line placement for IV antibx for ongoing UTI. Pt arrives AAO x 4. Son at [facility] sends pt to ER every Friday so she can stay for the weekend.     APPEARANCE: Alert, oriented x 4, and in no acute distress.  NEURO: 5/5 strength major flexors/extensors bilaterally. Sensory intact to light touch bilaterally. Nola coma scale: eyes open spontaneously-4, oriented & converses-5, obeys commands-6. No neurological abnormalities. Denies HA, dizziness, photophobia.  CARDIAC: Normal rate  S1 and S2 noted, denies CP.   RESPIRATORY:Normal rate and effort, Respirations are equal and unlabored, no obvious signs of distress. No SOB reported.  PERIPHERAL VASCULAR: +2 peripheral pulses present. Normal cap refill <3sec. No edema. Warm to touch.   GASTRO: LLQ colostomy Abdomen soft, flat, bowel sounds normal and active in all 4 quadrants, no tenderness, no abdominal distention. Denies NVD.  MUSC: Full ROM. No bony tenderness or soft tissue tenderness. No obvious deformity.  SKIN: Skin is thin, warm and dry, normal skin turgor, mucous membranes moist.   GENITOURINARY: Voids spontaneously, denies itching, burning, hematuria.    Patient hospital gowned. Side rails x 2, bed low and locked position, call light in reach and instructed how to use.

## 2024-05-11 NOTE — ED NOTES
Lab at bedside, bloodwork collected.  Pt has urine cup, hat placed in bathroom for ease of collection.  Son at bedside.  Will call with needs.  Pt in NAD at this time

## 2024-05-11 NOTE — ASSESSMENT & PLAN NOTE
Patient has hyponatremia which is controlled,We will aim to correct the sodium by 4-6mEq in 24 hours. We will monitor sodium Daily. The hyponatremia is due to Dehydration/hypovolemia. We will obtain the following studies: NA. We will treat the hyponatremia with cont monitor and diet. The patient's sodium results have been reviewed and are listed below.  Recent Labs   Lab 05/11/24  0433   *

## 2024-05-11 NOTE — CONSULTS
[facility] Consult Note    Primary Attending Physician: Soto Graves MD     Reason for Consult:     Complicated urinary tract infection with Pseudomonas aeruginosa.    Assessment (see problem list below):     Annette Garcia is a 88 y.o. female with:  Positive urine culture for Pseudomonas fluorescens.  Apparently was symptomatic with increased frequency but no dysuria.  This organisms not usually associated with urinary tract infections.    Recurrent positive urine cultures.  Likely related to incomplete emptying.  Has been prescribed topical estrogen Unclear whether she is receiving topical estrogen however.  Recent admission for small-bowel obstruction with lysis of adhesions,  CAD, hypertension, seizures, cough cancer status post surgery with colostomy, cholecystectomy, appendectomy, hysterectomy      Plan:     Can continue cefepime for the moment to complete 7 day course  Monitor cultures  Assure that patient is receiving estrogen application at least twice a week  Check postvoid residual   Monitor patient progress  Would only check urine culture if patient has marked dysuria or fever or encephalopathy as she is likely to be at high-risk for asymptomatic bacteriuria..    We will sign off at this time.    Thank you for allowing us to participate in the care of this patient. Please contact me if you have any questions regarding this consult.    Mark Valle  [facility]  Pager: [phone]    Subjective:      History of Present Illness:  Annette Garcia is a 88 y.o. female with CAD, hypertension, seizures, colon cancer history status post colostomy, cholecystectomy, lumbar back surgery, appendectomy, hysterectomy, urinary tract infection history presents from nursing home with presumed urinary tract infection.    Patient has had numerous positive urine cultures over the last 5 years.  One episode of bacteremia with E coli.    Patient known to our service from prior admission.  Please see  consult note from 01/31/2024 for details prior to that admission    01/28/2024 patient was admitted for vomiting and abdominal pain.  Patient was thought to have small bowel obstruction.  Patient had exploratory laparoscopy with lysis of adhesions Urine culture at that time positive for Pseudomonas aeruginosa.  Patient was treated for 14 days with cefepime for urinary tract infection.    Patient has been prescribed topical estrogen 02/16/2024.  She does not remember having this administered at the nursing home.    05/10/2024 patient seen in outside emergency department from nursing home because of positive urine culture for Pseudomonas fluorescens.  Patient apparently had been diagnosed with urinary tract infection because of increased frequency and was on Bactrim nitrofurantoin at that time.  Patient was difficult IV access and therefore was transferred to [place] for further management.  Upon evaluation, the patient was afebrile.  WBC 4.56.  Urinalysis showed white cell clumps.  Urine culture and blood cultures in process.  Patient was started on cefepime.    Infectious disease was consulted for complicated urinary tract infection with Pseudomonas aeruginosa.    Patient has numerous drug allergies listed    Review of positive cultures:  01/28/2024 urine culture positive for Pseudomonas aeruginosa  01/01/2024 urine culture positive for Acinetobacter  10/11/2022 urine culture positive for Enterococcus faecalis and Pseudomonas aeruginosa  08/29/2022 blood culture 1 bottle coag-negative staph  08/29/2022 urine culture positive for Pseudomonas aeruginosa  01/10/2020 urine culture positive for Proteus mirabilis  12/06/2019 urine culture positive for Proteus mirabilis  10/20/2019 blood culture 1 bottle positive E coli  Past Medical History:  Past Medical History:   Diagnosis Date    Allergy     Arthritis     arms and legs-osteoarthritis    Cancer     colon    Coronary artery disease 08/14/2020    Digestive disorder      Disorder of kidney and ureter     E coli bacteremia 10/29/2019    Encounter for blood transfusion     Hypertension     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       Past Surgical History:  Past Surgical History:   Procedure Laterality Date    APPENDECTOMY      BACK SURGERY  1988    vertebral fracture    BACK SURGERY  02/2013    lumbar L2-5    CATARACT EXTRACTION, BILATERAL Bilateral     CHOLECYSTECTOMY      open    ESOPHAGOGASTRODUODENOSCOPY N/A 2/15/2023    Procedure: EGD (ESOPHAGOGASTRODUODENOSCOPY);  Surgeon: Keith Chaavrria MD;  Location: [facility] ENDO;  Service: Endoscopy;  Laterality: N/A;    EYE SURGERY Bilateral     cataract removal with lens implant    HYSTERECTOMY      PERCUTANEOUS TRANSLUMINAL ANGIOPLASTY (PTA) OF PERIPHERAL VESSEL N/A 2/3/2020    Procedure: PTA, PERIPHERAL VESSEL;  Surgeon: Timmy Simmons MD;  Location: [facility] CATH LAB/EP;  Service: Cardiology;  Laterality: N/A;    PORTACATH PLACEMENT Right 09/2016    RENAL ARTERY STENT Left 07/19/2017    ROBOT-ASSISTED EXPLORATORY LAPAROSCOPY N/A 1/31/2024    Procedure: ROBOTIC LAPAROSCOPY, EXPLORATORY;  Surgeon: Chris Johnson MD;  Location: [facility] OR;  Service: General;  Laterality: N/A;    ROBOT-ASSISTED LAPAROSCOPIC REPAIR OF VENTRAL HERNIA N/A 3/23/2023    Procedure: ROBOTIC REPAIR, HERNIA, VENTRAL;  Surgeon: Chris Johnson MD;  Location: [facility] OR;  Service: General;  Laterality: N/A;  Robotic Parastomal hernia repair    ROBOT-ASSISTED LYSIS OF ADHESIONS N/A 1/31/2024    Procedure: ROBOTIC LYSIS, ADHESIONS;  Surgeon: Chris Johnson MD;  Location: [facility] OR;  Service: General;  Laterality: N/A;    SIGMOIDECTOMY  10/29/2019    SMALL INTESTINE SURGERY  08/23/2016    THROMBECTOMY N/A 2/3/2020    Procedure: THROMBECTOMY;  Surgeon: Timmy Simmons,  "MD;  Location: [facility] CATH LAB/EP;  Service: Cardiology;  Laterality: N/A;    TONSILLECTOMY      VAGINAL HYSTERECTOMY W/ ANTERIOR AND POSTERIOR VAGINAL REPAIR         Allergies:  Review of patient's allergies indicates:   Allergen Reactions    Adhesive Itching and Blisters    Penicillins Anaphylaxis    Tramadol Hives    Avelox [moxifloxacin] Rash     Facial and arm itching and redness. Pt states throat closes when given.    Amoxil [amoxicillin]     Aspridrox [aspirin, buffered]     Codeine Other (See Comments)     Throat swelling    Keflex [cephalexin]      Tolerated cefepime and cefazolin    Norvasc [amlodipine]     Red dye Hives    Robitussin [guaifenesin]     Sulfa (sulfonamide antibiotics)     Tylenol [acetaminophen]      Has reaction to Tylenol with red dye and unable to take Extra Strength Tylenol/ CAN ONLY TOLERATE REG STRENGTH TYLENOL    Vicks vaporub [camphor-eucalyptus oil-menthol]        Medications:  Reviewed  Antibiotics  Cefepime  Ceftriaxone x1  Family History:  Family History   Problem Relation Name Age of Onset    Pancreatic cancer Mother      Hypertension Father      Heart attack Father         Social History:  Social History     Tobacco Use    Smoking status: Never    Smokeless tobacco: Never   Substance Use Topics    Alcohol use: No    Drug use: No     Review of Systems   Genitourinary:  Positive for frequency. Negative for dysuria.   Neurological:  Positive for weakness.   All other systems reviewed and are negative.      Objective:   Last 24 Hour Vital Signs:  BP  Min: 138/61  Max: 170/68  Temp  Av.9 °F (36.6 °C)  Min: 97.3 °F (36.3 °C)  Max: 98.7 °F (37.1 °C)  Pulse  Av.7  Min: 63  Max: 84  Resp  Av.8  Min: 17  Max: 18  SpO2  Av.9 %  Min: 92 %  Max: 98 %  Height  Av' 9" (144.8 cm)  Min: 4' 9" (144.8 cm)  Max: 4' 9" (144.8 cm)  Weight  Av.3 kg (141 lb 12 oz)  Min: 62.6 kg (138 lb)  Max: 66 kg (145 lb 8.1 oz)  No intake/output data recorded.    Physical Exam  Vitals " and nursing note reviewed.   HENT:      Head: Normocephalic and atraumatic.      Nose: Nose normal. No congestion or rhinorrhea.      Mouth/Throat:      Mouth: Mucous membranes are moist.      Pharynx: Oropharynx is clear. No oropharyngeal exudate.   Eyes:      Extraocular Movements: Extraocular movements intact.      Conjunctiva/sclera: Conjunctivae normal.      Pupils: Pupils are equal, round, and reactive to light.   Cardiovascular:      Rate and Rhythm: Normal rate.      Heart sounds: No murmur heard.     No friction rub. No gallop.      Comments: Decreased pedal pulses  Pulmonary:      Breath sounds: Normal breath sounds. No rales.   Abdominal:      General: Bowel sounds are normal. There is no distension.      Palpations: Abdomen is soft.      Tenderness: There is no abdominal tenderness. There is no guarding.      Comments: Colostomy in place   Genitourinary:     Comments: External catheter  Musculoskeletal:      Cervical back: Neck supple.      Right lower leg: Edema present.      Left lower leg: No edema.      Comments: Upper extremities without lesions.  Lower extremities without lesions.  Left lower extremity larger than right with some edema.   Lymphadenopathy:      Cervical: No cervical adenopathy.   Skin:     Findings: No erythema, lesion or rash.   Neurological:      General: No focal deficit present.      Mental Status: She is alert and oriented to person, place, and time. Mental status is at baseline.   Psychiatric:         Mood and Affect: Mood normal.         Behavior: Behavior normal.         Devices:  PICC line  External catheter  Laboratory Results: reviewed  Most Recent Data:  CBC:   Lab Results   Component Value Date    WBC 3.52 (L) 05/11/2024    HGB 10.0 (L) 05/11/2024    HCT 29.7 (L) 05/11/2024     (L) 05/11/2024    MCV 96 05/11/2024    RDW 13.2 05/11/2024     BMP:   Lab Results   Component Value Date     (L) 05/11/2024    K 3.8 05/11/2024    CL 99 05/11/2024    CO2 28  05/11/2024    BUN 11 05/11/2024    GLU 89 05/11/2024    CALCIUM 9.2 05/11/2024    MG 1.6 02/02/2024    PHOS 2.1 (L) 03/27/2023     LFTs:   Lab Results   Component Value Date    PROT 7.4 05/10/2024    ALBUMIN 3.9 05/10/2024    BILITOT 0.2 05/10/2024    AST 22 05/10/2024    ALKPHOS 116 05/10/2024    ALT 19 05/10/2024     Urinalysis:   Lab Results   Component Value Date    LABURIN No growth 02/16/2024    COLORU [place] (A) 05/10/2024    CLARITYU Clear 02/16/2024    SPECGRAV 1.010 05/10/2024    NITRITE Positive (A) 05/10/2024    KETONESU Negative 05/10/2024    UROBILINOGEN Negative 05/10/2024       Trended Lab Data:  Recent Labs   Lab 05/10/24  2014 05/11/24  0433   WBC 4.56 3.52*   HGB 11.5* 10.0*   HCT 33.9* 29.7*    145*   MCV 97 96   RDW 13.2 13.2   * 135*   K 4.5 3.8   CL 95 99   CO2 28 28   BUN 15 11    89   PROT 7.4  --    ALBUMIN 3.9  --    BILITOT 0.2  --    AST 22  --    ALKPHOS 116  --    ALT 19  --        Microbiology Data:  Blood cultures no growth so far  Urine culture in process.  Outside urine culture positive for Pseudomonas fluorescens    Other Results:    Radiology:  Reviewed  Chest x-ray with PICC line in place.  No acute changes    Problem List  Patient Active Problem List   Diagnosis    Renovascular hypertension    Seizure disorder    Hypothyroidism    Osteoarthritis of lumbar spine    Degenerative joint disease of sacroiliac joint    Nausea and vomiting    S/P exploratory laparotomy    DLBCL (diffuse large B cell lymphoma)    Hypertension    Anemia due to antineoplastic chemotherapy    Bilateral renal artery stenosis    Hyponatremia    Closed comminuted fracture of right humerus    Closed displaced fracture of distal phalanx of right little finger    Age-related osteoporosis with current pathological fracture with routine healing    Closed wedge compression fracture of eleventh thoracic vertebra with routine healing    DDD (degenerative disc disease), lumbar    Chronic bilateral  low back pain without sciatica    Age-related osteoporosis without current pathological fracture    Vitamin D deficiency    Subjective memory complaints    Depression    Old MI (myocardial infarction)    Peripheral vascular disease, unspecified    Slow transit constipation    Abdominal pain    Sinus pause    Anemia of acute infection    Urinary retention    Hoarseness    Paralysis of right vocal cord    Hypomagnesemia    Palliative care encounter    Counseling regarding advance care planning and goals of care    Advance care planning    S/P partial resection of colon    A-fib    Acute deep vein thrombosis (DVT) of femoral vein of left lower extremity    Nursing home resident    Bilateral leg edema    May-Thurner syndrome    Syncope    Hypotensive episode    Coronary artery disease    Chronic anemia    DVT of deep femoral vein, left    Chronic anticoagulation    Colitis    Colostomy present    Partial small bowel obstruction    Leukocytosis    Small bowel obstruction    h/o DVT (deep venous thrombosis)    Chronic deep vein thrombosis (DVT) of left lower extremity    DNR (do not resuscitate)    Major depressive disorder, recurrent, unspecified    Intractable vomiting    Parastomal hernia with obstruction and without gangrene    Complicated UTI (urinary tract infection)    Hypertensive urgency    History of recurrent UTIs     See above for impression and recommendations.

## 2024-05-11 NOTE — HOSPITAL COURSE
5/11/24 per ID recommendation continue cefepime for the moment to complete 7 day course  Monitor cultures  Assure that patient is receiving estrogen application at least twice a week  Check postvoid residual   5/12 has finished course of 2 days of tobramycin. Per ID no need for more days of ABx. Plan to DC in AM to NH in AM  Repeat UA positive for gram negative Rods susceptibility pending, blood Cx still NGT

## 2024-05-11 NOTE — SUBJECTIVE & OBJECTIVE
Interval History:   Stated feeling better with Picc line place clean, no spike of fever, no new acute event.    Review of Systems   Constitutional:  Negative for activity change, appetite change and fever.   HENT:  Positive for hearing loss.    Respiratory:  Negative for chest tightness and shortness of breath.    Cardiovascular:  Negative for chest pain, palpitations and leg swelling.   Gastrointestinal:  Negative for abdominal pain, nausea and vomiting.   Genitourinary:  Negative for difficulty urinating and pelvic pain.   Musculoskeletal:  Negative for arthralgias and back pain.   Skin:  Negative for wound.   Neurological:  Negative for dizziness, speech difficulty and headaches.   Psychiatric/Behavioral:  Negative for agitation, confusion and hallucinations.    All other systems reviewed and are negative.    Objective:     Vital Signs (Most Recent):  Temp: 97.7 °F (36.5 °C) (05/11/24 1114)  Pulse: 63 (05/11/24 1114)  Resp: 18 (05/11/24 1114)  BP: (!) 153/69 (05/11/24 1114)  SpO2: 95 % (05/11/24 1114) Vital Signs (24h Range):  Temp:  [97.3 °F (36.3 °C)-98.7 °F (37.1 °C)] 97.7 °F (36.5 °C)  Pulse:  [63-84] 63  Resp:  [17-18] 18  SpO2:  [92 %-98 %] 95 %  BP: (138-170)/(61-76) 153/69     Weight: 66 kg (145 lb 8.1 oz)  Body mass index is 31.49 kg/m².    Intake/Output Summary (Last 24 hours) at 5/11/2024 1610  Last data filed at 5/11/2024 1408  Gross per 24 hour   Intake --   Output 1300 ml   Net -1300 ml         Physical Exam  Vitals and nursing note reviewed.   Constitutional:       General: She is not in acute distress.     Appearance: She is not toxic-appearing.   HENT:      Head: Normocephalic and atraumatic.      Mouth/Throat:      Mouth: Mucous membranes are moist.      Pharynx: No oropharyngeal exudate.   Eyes:      Extraocular Movements: Extraocular movements intact.      Pupils: Pupils are equal, round, and reactive to light.   Cardiovascular:      Rate and Rhythm: Normal rate.      Heart sounds: No murmur  "heard.     No friction rub. No gallop.   Pulmonary:      Effort: Pulmonary effort is normal. No respiratory distress.      Breath sounds: No wheezing or rales.   Chest:      Chest wall: No tenderness.   Abdominal:      General: Bowel sounds are normal. There is no distension.      Palpations: Abdomen is soft.      Tenderness: There is no abdominal tenderness. There is no guarding or rebound.   Musculoskeletal:         General: No swelling or tenderness.      Cervical back: No rigidity or tenderness.   Neurological:      General: No focal deficit present.      Mental Status: She is alert and oriented to person, place, and time.      Motor: No weakness.      Coordination: Coordination normal.      Gait: Gait normal.   Psychiatric:         Thought Content: Thought content normal.             Significant Labs: All pertinent labs within the past 24 hours have been reviewed.  Blood Culture:   Recent Labs   Lab 05/10/24  2014   LABBLOO No Growth to date  No Growth to date     BMP:   Recent Labs   Lab 05/11/24 0433   GLU 89   *   K 3.8   CL 99   CO2 28   BUN 11   CREATININE 0.7   CALCIUM 9.2     CBC:   Recent Labs   Lab 05/10/24  2014 05/11/24  0433   WBC 4.56 3.52*   HGB 11.5* 10.0*   HCT 33.9* 29.7*    145*     CMP:   Recent Labs   Lab 05/10/24  2014 05/11/24  0433   * 135*   K 4.5 3.8   CL 95 99   CO2 28 28    89   BUN 15 11   CREATININE 0.8 0.7   CALCIUM 9.7 9.2   PROT 7.4  --    ALBUMIN 3.9  --    BILITOT 0.2  --    ALKPHOS 116  --    AST 22  --    ALT 19  --    ANIONGAP 11 8     Urine Culture: No results for input(s): "LABURIN" in the last 48 hours.  Urine Studies:   Recent Labs   Lab 05/10/24  2032   COLORU [place]*   APPEARANCEUA Cloudy*   PHUR 7.0   SPECGRAV 1.010   PROTEINUA 2+*   GLUCUA Negative   KETONESU Negative   BILIRUBINUA Negative   OCCULTUA 2+*   NITRITE Positive*   UROBILINOGEN Negative   LEUKOCYTESUR 3+*   RBCUA >100*   WBCUA >100*   BACTERIA Many*   SQUAMEPITHEL 4 "   HYALINECASTS 0     Recent Lab Results         05/11/24  0433   05/10/24  2032   05/10/24  2014        Albumin     3.9       ALP     116       ALT     19       Anion Gap 8     11       Appearance, UA   Cloudy         AST     22       Bacteria, UA   Many         Baso # 0.02     0.03       Basophil % 0.6     0.7       Bilirubin (UA)   Negative         BILIRUBIN TOTAL     0.2  Comment: For infants and newborns, interpretation of results should be based  on gestational age, weight and in agreement with clinical  observations.    Premature Infant recommended reference ranges:  Up to 24 hours.............<8.0 mg/dL  Up to 48 hours............<12.0 mg/dL  3-5 days..................<15.0 mg/dL  6-29 days.................<15.0 mg/dL         Blood Culture, Routine     No Growth to date  [P]            No Growth to date  [P]       BUN 11     15       Calcium 9.2     9.7       Chloride 99     95       CO2 28     28       Color, UA   Orange         Creatinine 0.7     0.8       Differential Method Automated     Automated       eGFR >60     >60       Eos # 0.3     0.3       Eos % 7.7     5.7       Glucose 89     108       Glucose, UA   Negative         Gran # (ANC) 1.6     2.5       Gran % 44.5     55.4       Hematocrit 29.7     33.9       Hemoglobin 10.0     11.5       Hyaline Casts, UA   0         Immature Grans (Abs) 0.01  Comment: Mild elevation in immature granulocytes is non specific and   can be seen in a variety of conditions including stress response,   acute inflammation, trauma and pregnancy. Correlation with other   laboratory and clinical findings is essential.       0.01  Comment: Mild elevation in immature granulocytes is non specific and   can be seen in a variety of conditions including stress response,   acute inflammation, trauma and pregnancy. Correlation with other   laboratory and clinical findings is essential.         Immature Granulocytes 0.3     0.2       Ketones, UA   Negative         Lactic Acid Level      2.0  Comment: Falsely low lactic acid results can be found in samples   containing >=13.0 mg/dL total bilirubin and/or >=3.5 mg/dL   direct bilirubin.         Leukocyte Esterase, UA   3+         Lymph # 1.1     1.2       Lymph % 32.1     27.0       MCH 32.5     32.8       MCHC 33.7     33.9       MCV 96     97       Microscopic Comment   SEE COMMENT  Comment: Other formed elements not mentioned in the report are not   present in the microscopic examination.            Mono # 0.5     0.5       Mono % 14.8     11.0       MPV 9.6     9.7       NITRITE UA   Positive         nRBC 0     0       Blood, UA   2+         pH, UA   7.0         Platelet Count 145     170       Potassium 3.8     4.5       PROTEIN TOTAL     7.4       Protein, UA   2+  Comment: Recommend a 24 hour urine protein or a urine   protein/creatinine ratio if globulin induced proteinuria is  clinically suspected.           RBC 3.08     3.51       RBC, UA   >100         RDW 13.2     13.2       Sodium 135     134       Specific [place], UA   1.010         Specimen UA   Urine, Clean Catch         Squam Epithel, UA   4         UROBILINOGEN UA   Negative         WBC Clumps, UA   Many         WBC, UA   >100         WBC 3.52     4.56                [P] - Preliminary Result               Significant Imaging: I have reviewed all pertinent imaging results/findings within the past 24 hours.

## 2024-05-11 NOTE — PROGRESS NOTES
[facility] Medicine  Progress Note    Patient Name: Annette Garcia  MRN: [id number]  Patient Class: OP- Observation   Admission Date: 5/10/2024  Length of Stay: 0 days  Attending Physician: Soto Graves MD  Primary Care Provider: [facility]        Subjective:     Principal Problem:Complicated UTI (urinary tract infection)        HPI:  Annette Garcia is an 88 year old female w/ PMH seizure disorder, hypothyroidism, HTN, CAD, chronic anticoagulation, DLBCL, and anemia. She was sent into the ED from her nursing home for a urine cx positive for pseudomonas. She has been taking Bactrim and was due to finish that course on 5/14. She denies any fever, chills, N/V, abd pain, flank pain, or any urinary sx except incontinence.    Overview/Hospital Course:  5/11/24 per ID recommendation continue cefepime for the moment to complete 7 day course  Monitor cultures  Assure that patient is receiving estrogen application at least twice a week  Check postvoid residual     Interval History:   Stated feeling better with Picc line place clean, no spike of fever, no new acute event.    Review of Systems   Constitutional:  Negative for activity change, appetite change and fever.   HENT:  Positive for hearing loss.    Respiratory:  Negative for chest tightness and shortness of breath.    Cardiovascular:  Negative for chest pain, palpitations and leg swelling.   Gastrointestinal:  Negative for abdominal pain, nausea and vomiting.   Genitourinary:  Negative for difficulty urinating and pelvic pain.   Musculoskeletal:  Negative for arthralgias and back pain.   Skin:  Negative for wound.   Neurological:  Negative for dizziness, speech difficulty and headaches.   Psychiatric/Behavioral:  Negative for agitation, confusion and hallucinations.    All other systems reviewed and are negative.    Objective:     Vital Signs (Most Recent):  Temp: 97.7 °F (36.5 °C) (05/11/24 1114)  Pulse: 63 (05/11/24  1114)  Resp: 18 (05/11/24 1114)  BP: (!) 153/69 (05/11/24 1114)  SpO2: 95 % (05/11/24 1114) Vital Signs (24h Range):  Temp:  [97.3 °F (36.3 °C)-98.7 °F (37.1 °C)] 97.7 °F (36.5 °C)  Pulse:  [63-84] 63  Resp:  [17-18] 18  SpO2:  [92 %-98 %] 95 %  BP: (138-170)/(61-76) 153/69     Weight: 66 kg (145 lb 8.1 oz)  Body mass index is 31.49 kg/m².    Intake/Output Summary (Last 24 hours) at 5/11/2024 1610  Last data filed at 5/11/2024 1408  Gross per 24 hour   Intake --   Output 1300 ml   Net -1300 ml         Physical Exam  Vitals and nursing note reviewed.   Constitutional:       General: She is not in acute distress.     Appearance: She is not toxic-appearing.   HENT:      Head: Normocephalic and atraumatic.      Mouth/Throat:      Mouth: Mucous membranes are moist.      Pharynx: No oropharyngeal exudate.   Eyes:      Extraocular Movements: Extraocular movements intact.      Pupils: Pupils are equal, round, and reactive to light.   Cardiovascular:      Rate and Rhythm: Normal rate.      Heart sounds: No murmur heard.     No friction rub. No gallop.   Pulmonary:      Effort: Pulmonary effort is normal. No respiratory distress.      Breath sounds: No wheezing or rales.   Chest:      Chest wall: No tenderness.   Abdominal:      General: Bowel sounds are normal. There is no distension.      Palpations: Abdomen is soft.      Tenderness: There is no abdominal tenderness. There is no guarding or rebound.   Musculoskeletal:         General: No swelling or tenderness.      Cervical back: No rigidity or tenderness.   Neurological:      General: No focal deficit present.      Mental Status: She is alert and oriented to person, place, and time.      Motor: No weakness.      Coordination: Coordination normal.      Gait: Gait normal.   Psychiatric:         Thought Content: Thought content normal.             Significant Labs: All pertinent labs within the past 24 hours have been reviewed.  Blood Culture:   Recent Labs   Lab  "05/10/24  2014   LABBLOO No Growth to date  No Growth to date     BMP:   Recent Labs   Lab 05/11/24 0433   GLU 89   *   K 3.8   CL 99   CO2 28   BUN 11   CREATININE 0.7   CALCIUM 9.2     CBC:   Recent Labs   Lab 05/10/24  2014 05/11/24  0433   WBC 4.56 3.52*   HGB 11.5* 10.0*   HCT 33.9* 29.7*    145*     CMP:   Recent Labs   Lab 05/10/24  2014 05/11/24  0433   * 135*   K 4.5 3.8   CL 95 99   CO2 28 28    89   BUN 15 11   CREATININE 0.8 0.7   CALCIUM 9.7 9.2   PROT 7.4  --    ALBUMIN 3.9  --    BILITOT 0.2  --    ALKPHOS 116  --    AST 22  --    ALT 19  --    ANIONGAP 11 8     Urine Culture: No results for input(s): "LABURIN" in the last 48 hours.  Urine Studies:   Recent Labs   Lab 05/10/24  2032   COLORU Orange*   APPEARANCEUA Cloudy*   PHUR 7.0   SPECGRAV 1.010   PROTEINUA 2+*   GLUCUA Negative   KETONESU Negative   BILIRUBINUA Negative   OCCULTUA 2+*   NITRITE Positive*   UROBILINOGEN Negative   LEUKOCYTESUR 3+*   RBCUA >100*   WBCUA >100*   BACTERIA Many*   SQUAMEPITHEL 4   HYALINECASTS 0     Recent Lab Results         05/11/24  0433   05/10/24  2032   05/10/24  2014        Albumin     3.9       ALP     116       ALT     19       Anion Gap 8     11       Appearance, UA   Cloudy         AST     22       Bacteria, UA   Many         Baso # 0.02     0.03       Basophil % 0.6     0.7       Bilirubin (UA)   Negative         BILIRUBIN TOTAL     0.2  Comment: For infants and newborns, interpretation of results should be based  on gestational age, weight and in agreement with clinical  observations.    Premature Infant recommended reference ranges:  Up to 24 hours.............<8.0 mg/dL  Up to 48 hours............<12.0 mg/dL  3-5 days..................<15.0 mg/dL  6-29 days.................<15.0 mg/dL         Blood Culture, Routine     No Growth to date  [P]            No Growth to date  [P]       BUN 11     15       Calcium 9.2     9.7       Chloride 99     95       CO2 28     28       " Color, UA   Orange         Creatinine 0.7     0.8       Differential Method Automated     Automated       eGFR >60     >60       Eos # 0.3     0.3       Eos % 7.7     5.7       Glucose 89     108       Glucose, UA   Negative         Gran # (ANC) 1.6     2.5       Gran % 44.5     55.4       Hematocrit 29.7     33.9       Hemoglobin 10.0     11.5       Hyaline Casts, UA   0         Immature Grans (Abs) 0.01  Comment: Mild elevation in immature granulocytes is non specific and   can be seen in a variety of conditions including stress response,   acute inflammation, trauma and pregnancy. Correlation with other   laboratory and clinical findings is essential.       0.01  Comment: Mild elevation in immature granulocytes is non specific and   can be seen in a variety of conditions including stress response,   acute inflammation, trauma and pregnancy. Correlation with other   laboratory and clinical findings is essential.         Immature Granulocytes 0.3     0.2       Ketones, UA   Negative         Lactic Acid Level     2.0  Comment: Falsely low lactic acid results can be found in samples   containing >=13.0 mg/dL total bilirubin and/or >=3.5 mg/dL   direct bilirubin.         Leukocyte Esterase, UA   3+         Lymph # 1.1     1.2       Lymph % 32.1     27.0       MCH 32.5     32.8       MCHC 33.7     33.9       MCV 96     97       Microscopic Comment   SEE COMMENT  Comment: Other formed elements not mentioned in the report are not   present in the microscopic examination.            Mono # 0.5     0.5       Mono % 14.8     11.0       MPV 9.6     9.7       NITRITE UA   Positive         nRBC 0     0       Blood, UA   2+         pH, UA   7.0         Platelet Count 145     170       Potassium 3.8     4.5       PROTEIN TOTAL     7.4       Protein, UA   2+  Comment: Recommend a 24 hour urine protein or a urine   protein/creatinine ratio if globulin induced proteinuria is  clinically suspected.           RBC 3.08     3.51        RBC, UA   >100         RDW 13.2     13.2       Sodium 135     134       Specific [place], UA   1.010         Specimen UA   Urine, Clean Catch         Squam Epithel, UA   4         UROBILINOGEN UA   Negative         WBC Clumps, UA   Many         WBC, UA   >100         WBC 3.52     4.56                [P] - Preliminary Result               Significant Imaging: I have reviewed all pertinent imaging results/findings within the past 24 hours.    Assessment/Plan:      * Complicated UTI (urinary tract infection)  Urine cx positive for pseudomonas  Sensitive to cefepime  Allergy listed to PCN and keflex but patient has tolerated cephalosporins in the past  VERY poor vasculature so PICC line ordered to be placed in ED to start cefepime  Dose not given yet, awaiting PICC  Bactrim started 5/7 (initially scheduled to end 5/14)  Per recommendation   continue cefepime for the moment to complete 7 day course  Monitor cultures  Assure that patient is receiving estrogen application at least twice a week  Check postvoid residual     Chronic anticoagulation  Eliquis      Coronary artery disease  Patient with known CAD s/p stent placement, which is controlled Will continue Statin and monitor for S/Sx of angina/ACS. Continue to monitor on telemetry.     Hyponatremia  Patient has hyponatremia which is controlled,We will aim to correct the sodium by 4-6mEq in 24 hours. We will monitor sodium Daily. The hyponatremia is due to Dehydration/hypovolemia. We will obtain the following studies: NA. We will treat the hyponatremia with cont monitor and diet. The patient's sodium results have been reviewed and are listed below.  Recent Labs   Lab 05/11/24  0433   *       Anemia due to antineoplastic chemotherapy  Patient's anemia is currently controlled. Has not received any PRBCs to date. 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    Hypertension  Chronic, controlled. Latest blood pressure and vitals reviewed-     Temp:  [97.3 °F (36.3 °C)-98.7 °F (37.1 °C)]   Pulse:  [63-84]   Resp:  [17-18]   BP: (138-170)/(61-76)   SpO2:  [92 %-98 %] .   Home meds for hypertension were reviewed and noted below.   Hypertension Medications               furosemide (LASIX) 20 MG tablet Take 20 mg by mouth once daily.    losartan (COZAAR) 25 MG tablet Take 25 mg by mouth once daily.    metoprolol tartrate (LOPRESSOR) 25 MG tablet Take 25 mg by mouth 2 (two) times daily.            While in the hospital, will manage blood pressure as follows; Continue home antihypertensive regimen    Will utilize p.r.n. blood pressure medication only if patient's blood pressure greater than 180/110 and she develops symptoms such as worsening chest pain or shortness of breath.    Hypothyroidism  Continue synthroid      Seizure disorder  Continue trileptal and phenobarbital  Seizure precautions        VTE Risk Mitigation (From admission, onward)           Ordered     apixaban tablet 2.5 mg  2 times daily         05/11/24 0010     Reason for No Pharmacological VTE Prophylaxis  Once        Question:  Reasons:  Answer:  Already adequately anticoagulated on oral Anticoagulants    05/10/24 2311     IP VTE HIGH RISK PATIENT  Once         05/10/24 2311     Place sequential compression device  Until discontinued         05/10/24 2311                    Discharge Planning   JESSICA:      Code Status: DNR   Is the patient medically ready for discharge?:     Reason for patient still in hospital (select all that apply): Patient trending condition, Consult recommendations, and Pending disposition  Discharge Plan A: Return to nursing home      Inpatient Upgrade Note    Annette Garcia has warranted treatment spanning two or more midnights  of hospital level care for the management of  complicated UTI . She continues to require IV antibiotics. Her condition is also complicated by the following comorbidities: Hypertension, Immunosuppression, and Malignancy.     Time spent > 35 min        Soto Graves MD  Department of [facility] Medicine   [facility]

## 2024-05-11 NOTE — PLAN OF CARE
Problem: Adult Inpatient Plan of Care  Goal: Plan of Care Review  Outcome: Progressing      VIRTUAL NURSE: Pt arrived to unit. Permission received per patient to turn camera to view patient. VIP model explained; patient informed this VN will be working with bedside nurse and the rest of the care team. Plan of care reviewed with patient.  Educated patient on VTE, fall risk and fall risk precautions in place. Call light within reach, side rails up x2. Admission questions completed. Patient instucted to ask staff for assistance. Patient verbalized complete understanding. Patient denies complaints or any needs at this time. Will continue to be available and intervene as needed.    Labs, notes, orders, and careplan reviewed.

## 2024-05-11 NOTE — ASSESSMENT & PLAN NOTE
Urine cx positive for pseudomonas  Sensitive to rocephin  Allergy listed to PCN and keflex but patient has tolerated cephalosporins in the past  VERY poor vasculature so PICC line ordered to be placed in ED to start rocephin  Dose not given yet, awaiting PICC  Bactrim started 5/7 (initially scheduled to end 5/14)

## 2024-05-11 NOTE — ASSESSMENT & PLAN NOTE
Urine cx positive for pseudomonas  Sensitive to cefepime  Allergy listed to PCN and keflex but patient has tolerated cephalosporins in the past  VERY poor vasculature so PICC line ordered to be placed in ED to start cefepime  Dose not given yet, awaiting PICC  Bactrim started 5/7 (initially scheduled to end 5/14)  Per recommendation   continue cefepime for the moment to complete 7 day course  Monitor cultures  Assure that patient is receiving estrogen application at least twice a week  Check postvoid residual

## 2024-05-11 NOTE — PROGRESS NOTES
Pharmacokinetic Initial Assessment: Tobramycin    Assessment:  Weight utilized for dose calculation: Adjusted Body Weight  Dosing method utilized: extended interval dosing    Plan: Extended interval dosing regimen: Tobramycin 270 mg IV once, followed by a random level to be drawn on 05/12 at 0500, 8-12 hours after the first dose.    Pharmacy will continue to monitor.    Please contact pharmacy at extension [phone] with any questions regarding this assessment.    Thank you for the consult,    Gabriel Duque       Patient brief summary:  Annette Garcia is a 88 y.o. female initiated on aminoglycoside therapy for treatment of suspected urinary tract infection    Drug Allergies:   Review of patient's allergies indicates:   Allergen Reactions    Adhesive Itching and Blisters    Penicillins Anaphylaxis    Tramadol Hives    Avelox [moxifloxacin] Rash     Facial and arm itching and redness. Pt states throat closes when given.    Amoxil [amoxicillin]     Aspridrox [aspirin, buffered]     Codeine Other (See Comments)     Throat swelling    Keflex [cephalexin]      Tolerated cefepime and cefazolin    Norvasc [amlodipine]     Red dye Hives    Robitussin [guaifenesin]     Sulfa (sulfonamide antibiotics)     Tylenol [acetaminophen]      Has reaction to Tylenol with red dye and unable to take Extra Strength Tylenol/ CAN ONLY TOLERATE REG STRENGTH TYLENOL    Vicks vaporub [camphor-eucalyptus oil-menthol]        Actual Body Weight:   66    Adjust Body Weight:   53.7    Ideal Body Weight:  45.5    Renal Function:   Estimated Creatinine Clearance: 48.9 mL/min (based on SCr of 0.7 mg/dL).,     Dialysis Method (if applicable):  N/A    CBC (last 72 hours):  Recent Labs   Lab Result Units 05/10/24  2014 05/11/24  0433   WBC K/uL 4.56 3.52*   Hemoglobin g/dL 11.5* 10.0*   Hematocrit % 33.9* 29.7*   Platelets K/uL 170 145*   Gran % % 55.4 44.5   Lymph % % 27.0 32.1   Mono % % 11.0 14.8   Eosinophil % % 5.7 7.7   Basophil % % 0.7 0.6    Differential Method  Automated Automated       Metabolic Panel (last 72 hours):  Recent Labs   Lab Result Units 05/10/24  2014 05/10/24  2032 05/11/24  0433   Sodium mmol/L 134*  --  135*   Potassium mmol/L 4.5  --  3.8   Chloride mmol/L 95  --  99   CO2 mmol/L 28  --  28   Glucose mg/dL 108  --  89   Glucose, UA   --  Negative  --    BUN mg/dL 15  --  11   Creatinine mg/dL 0.8  --  0.7   Albumin g/dL 3.9  --   --    Total Bilirubin mg/dL 0.2  --   --    Alkaline Phosphatase U/L 116  --   --    AST U/L 22  --   --    ALT U/L 19  --   --        Microbiologic Results:  Microbiology Results (last 7 days)       Procedure Component Value Units Date/Time    Blood Culture #2 **CANNOT BE ORDERED STAT** [[id number]] Collected: 05/10/24 2014    Order Status: Completed Specimen: Blood from Peripheral, Wrist, Right Updated: 05/11/24 0745     Blood Culture, Routine No Growth to date    Blood Culture #1 **CANNOT BE ORDERED STAT** [[id number]] Collected: 05/10/24 2014    Order Status: Completed Specimen: Blood from Peripheral, Wrist, Left Updated: 05/11/24 0745     Blood Culture, Routine No Growth to date    Urine culture [[id number]] Collected: 05/10/24 2032    Order Status: No result Specimen: Urine Updated: 05/10/24 2043

## 2024-05-11 NOTE — HPI
Annette Garcia is an 88 year old female w/ PMH seizure disorder, hypothyroidism, HTN, CAD, chronic anticoagulation, DLBCL, and anemia. She was sent into the ED from her nursing home for a urine cx positive for pseudomonas. She has been taking Bactrim and was due to finish that course on 5/14. She denies any fever, chills, N/V, abd pain, flank pain, or any urinary sx except incontinence.

## 2024-05-11 NOTE — ED NOTES
Pt to bathroom and back to bed via wc with 2 person assist.  Urine sent to lab- completely cloudy with pus noted.

## 2024-05-11 NOTE — PLAN OF CARE
ISRAEL communicated with nurse at [facility]. Nurse confirmed pt is a senior care resident at NH. Per nurse there pharmacy closes at 1pm on Saturday and is closed on Sunday. ISRAEL notified MD. Anticipated dc Monday if medically stable to return to NH.     Future Appointments   Date Time Provider Department Center   5/16/2024  9:30 AM Mari Clark FNP [facility]OLOG[facility]        05/11/24 1248   Post-Acute Status   Post-Acute Authorization Placement   Discharge Plan   Discharge Plan A Return to nursing home

## 2024-05-11 NOTE — ASSESSMENT & PLAN NOTE
Chronic, controlled. Latest blood pressure and vitals reviewed-     Temp:  [97.3 °F (36.3 °C)-98.7 °F (37.1 °C)]   Pulse:  [63-84]   Resp:  [17-18]   BP: (138-170)/(61-76)   SpO2:  [92 %-98 %] .   Home meds for hypertension were reviewed and noted below.   Hypertension Medications               furosemide (LASIX) 20 MG tablet Take 20 mg by mouth once daily.    losartan (COZAAR) 25 MG tablet Take 25 mg by mouth once daily.    metoprolol tartrate (LOPRESSOR) 25 MG tablet Take 25 mg by mouth 2 (two) times daily.            While in the hospital, will manage blood pressure as follows; Continue home antihypertensive regimen    Will utilize p.r.n. blood pressure medication only if patient's blood pressure greater than 180/110 and she develops symptoms such as worsening chest pain or shortness of breath.

## 2024-05-11 NOTE — ASSESSMENT & PLAN NOTE
Chronic, controlled. Latest blood pressure and vitals reviewed-     Temp:  [98.1 °F (36.7 °C)]   Pulse:  [71-72]   Resp:  [18]   BP: (145-170)/(62-68)   SpO2:  [96 %-97 %] .   Home meds for hypertension were reviewed and noted below.   Hypertension Medications               furosemide (LASIX) 20 MG tablet Take 20 mg by mouth once daily.    losartan (COZAAR) 25 MG tablet Take 25 mg by mouth once daily.    metoprolol tartrate (LOPRESSOR) 25 MG tablet Take 25 mg by mouth 2 (two) times daily.            While in the hospital, will manage blood pressure as follows; Continue home antihypertensive regimen    Will utilize p.r.n. blood pressure medication only if patient's blood pressure greater than 180/110 and she develops symptoms such as worsening chest pain or shortness of breath.

## 2024-05-12 LAB
ANION GAP SERPL CALC-SCNC: 9 MMOL/L (ref 8–16)
BASOPHILS # BLD AUTO: 0.04 K/UL (ref 0–0.2)
BASOPHILS NFR BLD: 0.9 % (ref 0–1.9)
BUN SERPL-MCNC: 12 MG/DL (ref 8–23)
CALCIUM SERPL-MCNC: 9.1 MG/DL (ref 8.7–10.5)
CHLORIDE SERPL-SCNC: 97 MMOL/L (ref 95–110)
CO2 SERPL-SCNC: 27 MMOL/L (ref 23–29)
CREAT SERPL-MCNC: 0.8 MG/DL (ref 0.5–1.4)
DIFFERENTIAL METHOD BLD: ABNORMAL
EOSINOPHIL # BLD AUTO: 0.3 K/UL (ref 0–0.5)
EOSINOPHIL NFR BLD: 7 % (ref 0–8)
ERYTHROCYTE [DISTWIDTH] IN BLOOD BY AUTOMATED COUNT: 13.2 % (ref 11.5–14.5)
EST. GFR  (NO RACE VARIABLE): >60 ML/MIN/1.73 M^2
GLUCOSE SERPL-MCNC: 97 MG/DL (ref 70–110)
HCT VFR BLD AUTO: 32.8 % (ref 37–48.5)
HGB BLD-MCNC: 10.9 G/DL (ref 12–16)
IMM GRANULOCYTES # BLD AUTO: 0 K/UL (ref 0–0.04)
IMM GRANULOCYTES NFR BLD AUTO: 0 % (ref 0–0.5)
LYMPHOCYTES # BLD AUTO: 0.7 K/UL (ref 1–4.8)
LYMPHOCYTES NFR BLD: 17.4 % (ref 18–48)
MCH RBC QN AUTO: 32.1 PG (ref 27–31)
MCHC RBC AUTO-ENTMCNC: 33.2 G/DL (ref 32–36)
MCV RBC AUTO: 97 FL (ref 82–98)
MONOCYTES # BLD AUTO: 0.6 K/UL (ref 0.3–1)
MONOCYTES NFR BLD: 12.9 % (ref 4–15)
NEUTROPHILS # BLD AUTO: 2.6 K/UL (ref 1.8–7.7)
NEUTROPHILS NFR BLD: 61.8 % (ref 38–73)
NRBC BLD-RTO: 0 /100 WBC
PLATELET # BLD AUTO: 159 K/UL (ref 150–450)
PMV BLD AUTO: 9.9 FL (ref 9.2–12.9)
POTASSIUM SERPL-SCNC: 3.8 MMOL/L (ref 3.5–5.1)
RBC # BLD AUTO: 3.4 M/UL (ref 4–5.4)
SODIUM SERPL-SCNC: 133 MMOL/L (ref 136–145)
TOBRAMYCIN SERPL-MCNC: 5.6 UG/ML
WBC # BLD AUTO: 4.26 K/UL (ref 3.9–12.7)

## 2024-05-12 PROCEDURE — 25000003 PHARM REV CODE 250: Performed by: FAMILY MEDICINE

## 2024-05-12 PROCEDURE — 25000003 PHARM REV CODE 250: Performed by: HOSPITALIST

## 2024-05-12 PROCEDURE — 25000003 PHARM REV CODE 250: Performed by: NURSE PRACTITIONER

## 2024-05-12 PROCEDURE — 11000001 HC ACUTE MED/SURG PRIVATE ROOM

## 2024-05-12 PROCEDURE — A4216 STERILE WATER/SALINE, 10 ML: HCPCS | Performed by: HOSPITALIST

## 2024-05-12 PROCEDURE — 80200 ASSAY OF TOBRAMYCIN: CPT | Mod: 91 | Performed by: FAMILY MEDICINE

## 2024-05-12 PROCEDURE — 80200 ASSAY OF TOBRAMYCIN: CPT | Performed by: FAMILY MEDICINE

## 2024-05-12 PROCEDURE — 85025 COMPLETE CBC W/AUTO DIFF WBC: CPT | Performed by: NURSE PRACTITIONER

## 2024-05-12 PROCEDURE — 63600175 PHARM REV CODE 636 W HCPCS: Performed by: FAMILY MEDICINE

## 2024-05-12 PROCEDURE — 80048 BASIC METABOLIC PNL TOTAL CA: CPT | Performed by: NURSE PRACTITIONER

## 2024-05-12 RX ADMIN — LEVOTHYROXINE SODIUM 125 MCG: 125 TABLET ORAL at 05:05

## 2024-05-12 RX ADMIN — DOCUSATE SODIUM 100 MG: 100 CAPSULE, LIQUID FILLED ORAL at 09:05

## 2024-05-12 RX ADMIN — METOPROLOL TARTRATE 25 MG: 25 TABLET, FILM COATED ORAL at 08:05

## 2024-05-12 RX ADMIN — FUROSEMIDE 20 MG: 20 TABLET ORAL at 08:05

## 2024-05-12 RX ADMIN — PHENOBARBITAL 97.2 MG: 32.4 TABLET ORAL at 09:05

## 2024-05-12 RX ADMIN — TOBRAMYCIN SULFATE 270 MG: 40 INJECTION, SOLUTION INTRAMUSCULAR; INTRAVENOUS at 09:05

## 2024-05-12 RX ADMIN — OXCARBAZEPINE 150 MG: 150 TABLET, FILM COATED ORAL at 09:05

## 2024-05-12 RX ADMIN — DOCUSATE SODIUM 100 MG: 100 CAPSULE, LIQUID FILLED ORAL at 08:05

## 2024-05-12 RX ADMIN — Medication 10 ML: at 05:05

## 2024-05-12 RX ADMIN — PANTOPRAZOLE SODIUM 40 MG: 40 TABLET, DELAYED RELEASE ORAL at 08:05

## 2024-05-12 RX ADMIN — APIXABAN 2.5 MG: 2.5 TABLET, FILM COATED ORAL at 09:05

## 2024-05-12 RX ADMIN — APIXABAN 2.5 MG: 2.5 TABLET, FILM COATED ORAL at 08:05

## 2024-05-12 RX ADMIN — LOSARTAN POTASSIUM 25 MG: 25 TABLET, FILM COATED ORAL at 08:05

## 2024-05-12 RX ADMIN — POLYETHYLENE GLYCOL 3350 17 G: 17 POWDER, FOR SOLUTION ORAL at 08:05

## 2024-05-12 RX ADMIN — Medication 10 ML: at 11:05

## 2024-05-12 RX ADMIN — METOPROLOL TARTRATE 25 MG: 25 TABLET, FILM COATED ORAL at 09:05

## 2024-05-12 RX ADMIN — PRAVASTATIN SODIUM 20 MG: 20 TABLET ORAL at 09:05

## 2024-05-12 RX ADMIN — Medication 10 ML: at 12:05

## 2024-05-12 NOTE — ASSESSMENT & PLAN NOTE
Urine cx positive for pseudomonas  Sensitive to cefepime  Allergy listed to PCN and keflex but patient has tolerated cephalosporins in the past  VERY poor vasculature so PICC line ordered to be placed in ED to start cefepime  Dose not given yet, awaiting PICC  Bactrim started 5/7 (initially scheduled to end 5/14)  Per recommendation   continue cefepime for the moment to complete 7 day course  Monitor cultures  Assure that patient is receiving estrogen application at least twice a week  Check postvoid residual   Then ID recommendation it's for tobramycin x 2 days complete and patient does not need more days of IV Abx.

## 2024-05-12 NOTE — SUBJECTIVE & OBJECTIVE
Interval History:   Stated feeling great, tolerate first dose of tobramycin, without acute event, no diarrhea, no CP or SOB, repeat positive for gram negative rods    Review of Systems   Constitutional:  Negative for activity change, appetite change, chills and fever.   Respiratory:  Negative for cough, chest tightness and shortness of breath.    Cardiovascular:  Negative for chest pain, palpitations and leg swelling.   Gastrointestinal:  Negative for abdominal pain, diarrhea, nausea and vomiting.   Genitourinary:  Negative for difficulty urinating and dysuria.   Musculoskeletal:  Negative for arthralgias, back pain and gait problem.   Neurological:  Negative for speech difficulty and weakness.   Psychiatric/Behavioral:  Negative for agitation, confusion and hallucinations.    All other systems reviewed and are negative.    Objective:     Vital Signs (Most Recent):  Temp: 98.3 °F (36.8 °C) (05/12/24 1139)  Pulse: 76 (05/12/24 1139)  Resp: 18 (05/12/24 1139)  BP: (!) 117/53 (05/12/24 1139)  SpO2: 98 % (05/12/24 1139) Vital Signs (24h Range):  Temp:  [97.5 °F (36.4 °C)-98.3 °F (36.8 °C)] 98.3 °F (36.8 °C)  Pulse:  [] 76  Resp:  [18-19] 18  SpO2:  [95 %-99 %] 98 %  BP: (113-158)/(53-70) 117/53     Weight: 66 kg (145 lb 8.1 oz)  Body mass index is 31.49 kg/m².    Intake/Output Summary (Last 24 hours) at 5/12/2024 1520  Last data filed at 5/12/2024 0509  Gross per 24 hour   Intake --   Output 400 ml   Net -400 ml         Physical Exam  Vitals and nursing note reviewed.   Constitutional:       General: She is not in acute distress.     Appearance: She is not toxic-appearing.   HENT:      Head: Normocephalic and atraumatic.      Mouth/Throat:      Mouth: Mucous membranes are moist.   Eyes:      Extraocular Movements: Extraocular movements intact.      Pupils: Pupils are equal, round, and reactive to light.   Cardiovascular:      Rate and Rhythm: Normal rate.      Heart sounds: No murmur heard.     No friction rub. No  "gallop.   Pulmonary:      Effort: Pulmonary effort is normal. No respiratory distress.      Breath sounds: No wheezing or rales.   Chest:      Chest wall: No tenderness.   Abdominal:      General: Bowel sounds are normal. There is no distension.      Palpations: Abdomen is soft.      Tenderness: There is no abdominal tenderness. There is no guarding or rebound.   Musculoskeletal:      Cervical back: No rigidity or tenderness.      Right lower leg: No edema.      Left lower leg: No edema.   Skin:     Findings: No erythema.   Neurological:      General: No focal deficit present.      Mental Status: She is alert and oriented to person, place, and time.      Motor: No weakness.      Coordination: Coordination normal.   Psychiatric:         Thought Content: Thought content normal.             Significant Labs: All pertinent labs within the past 24 hours have been reviewed.  Blood Culture:   Recent Labs   Lab 05/10/24  2014   LABBLOO No Growth to date  No Growth to date  No Growth to date  No Growth to date     BMP:   Recent Labs   Lab 05/12/24  0533   GLU 97   *   K 3.8   CL 97   CO2 27   BUN 12   CREATININE 0.8   CALCIUM 9.1     CBC:   Recent Labs   Lab 05/10/24  2014 05/11/24  0433 05/12/24  0533   WBC 4.56 3.52* 4.26   HGB 11.5* 10.0* 10.9*   HCT 33.9* 29.7* 32.8*    145* 159     CMP:   Recent Labs   Lab 05/10/24  2014 05/11/24  0433 05/12/24  0533   * 135* 133*   K 4.5 3.8 3.8   CL 95 99 97   CO2 28 28 27    89 97   BUN 15 11 12   CREATININE 0.8 0.7 0.8   CALCIUM 9.7 9.2 9.1   PROT 7.4  --   --    ALBUMIN 3.9  --   --    BILITOT 0.2  --   --    ALKPHOS 116  --   --    AST 22  --   --    ALT 19  --   --    ANIONGAP 11 8 9     Lipid Panel: No results for input(s): "CHOL", "HDL", "LDLCALC", "TRIG", "CHOLHDL" in the last 48 hours.  Respiratory Culture: No results for input(s): "GSRESP", "RESPIRATORYC" in the last 48 hours.  Urine Culture:   Recent Labs   Lab 05/10/24  2032   LABURIN GRAM " NEGATIVE SAMEERA  >100,000 cfu/ml  Identification and susceptibility pending  No other significant isolate  *     Urine Studies:   Recent Labs   Lab 05/10/24  2032   COLORU Orange*   APPEARANCEUA Cloudy*   PHUR 7.0   SPECGRAV 1.010   PROTEINUA 2+*   GLUCUA Negative   KETONESU Negative   BILIRUBINUA Negative   OCCULTUA 2+*   NITRITE Positive*   UROBILINOGEN Negative   LEUKOCYTESUR 3+*   RBCUA >100*   WBCUA >100*   BACTERIA Many*   SQUAMEPITHEL 4   HYALINECASTS 0     Recent Lab Results         05/12/24  0533        Anion Gap 9       Baso # 0.04       Basophil % 0.9       BUN 12       Calcium 9.1       Chloride 97       CO2 27       Creatinine 0.8       Differential Method Automated       eGFR >60       Eos # 0.3       Eos % 7.0       Glucose 97       Gran # (ANC) 2.6       Gran % 61.8       Hematocrit 32.8       Hemoglobin 10.9       Immature Grans (Abs) 0.00  Comment: Mild elevation in immature granulocytes is non specific and   can be seen in a variety of conditions including stress response,   acute inflammation, trauma and pregnancy. Correlation with other   laboratory and clinical findings is essential.         Immature Granulocytes 0.0       Lymph # 0.7       Lymph % 17.4       MCH 32.1       MCHC 33.2       MCV 97       Mono # 0.6       Mono % 12.9       MPV 9.9       nRBC 0       Platelet Count 159       Potassium 3.8       RBC 3.40       RDW 13.2       Sodium 133       Tobramycin, Random 5.6  Comment: Therapeutic range not established for random samples.       WBC 4.26               Significant Imaging: I have reviewed all pertinent imaging results/findings within the past 24 hours.

## 2024-05-12 NOTE — PROGRESS NOTES
"Pharmacokinetic Follow Up: Tobramycin    Assessment of levels:   Random concentration of 5.6 mcg/mL (7 hours post-infusion) corresponds to a dosing interval of every 24 hours per the [place] [facility]    Regimen Plan:   Change dosing regimen to: Tobramycin 270 mg IV every 24 hours    Drug levels (last 3 results):  No results for input(s): "AMIKACINPEAK", "AMIKACINTROU", "AMIKACINRAND", "AMIKACIN" in the last 72 hours.    No results for input(s): "GENTAMICIN" in the last 72 hours.    Invalid input(s): "GENTPEAK", "GENTTROUGH", "GENT10", "GENT12", "GENT8", "GENTRANDOM"    Recent Labs   Lab Result Units 05/12/24  0533   Tobramycin, Random ug/mL 5.6       Pharmacy will continue to monitor.    Please contact pharmacy at extension [phone] with any questions regarding this assessment.    Thank you for the consult,   Gabriel Duque      Patient brief summary:  Annette Garcia is a 88 y.o. female initiated on aminoglycoside therapy for treatment of urinary tract infection    Drug Allergies:   Review of patient's allergies indicates:   Allergen Reactions    Adhesive Itching and Blisters    Penicillins Anaphylaxis    Tramadol Hives    Avelox [moxifloxacin] Rash     Facial and arm itching and redness. Pt states throat closes when given.    Amoxil [amoxicillin]     Aspridrox [aspirin, buffered]     Codeine Other (See Comments)     Throat swelling    Keflex [cephalexin]      Tolerated cefepime and cefazolin    Norvasc [amlodipine]     Red dye Hives    Robitussin [guaifenesin]     Sulfa (sulfonamide antibiotics)     Tylenol [acetaminophen]      Has reaction to Tylenol with red dye and unable to take Extra Strength Tylenol/ CAN ONLY TOLERATE REG STRENGTH TYLENOL    Vicks vaporub [camphor-eucalyptus oil-menthol]        Actual Body Weight:   66kg    Adjust Body Weight:   53.7kg    Ideal Body Weight:  45.5kg    Renal Function:   Estimated Creatinine Clearance: 42.8 mL/min (based on SCr of 0.8 mg/dL).,     Dialysis Method (if " applicable):  N/A    CBC (last 72 hours):  Recent Labs   Lab Result Units 05/10/24  2014 05/11/24  0433 05/12/24  0533   WBC K/uL 4.56 3.52* 4.26   Hemoglobin g/dL 11.5* 10.0* 10.9*   Hematocrit % 33.9* 29.7* 32.8*   Platelets K/uL 170 145* 159   Gran % % 55.4 44.5 61.8   Lymph % % 27.0 32.1 17.4*   Mono % % 11.0 14.8 12.9   Eosinophil % % 5.7 7.7 7.0   Basophil % % 0.7 0.6 0.9   Differential Method  Automated Automated Automated       Metabolic Panel (last 72 hours):  Recent Labs   Lab Result Units 05/10/24  2014 05/10/24  2032 05/11/24  0433 05/12/24  0533   Sodium mmol/L 134*  --  135* 133*   Potassium mmol/L 4.5  --  3.8 3.8   Chloride mmol/L 95  --  99 97   CO2 mmol/L 28  --  28 27   Glucose mg/dL 108  --  89 97   Glucose, UA   --  Negative  --   --    BUN mg/dL 15  --  11 12   Creatinine mg/dL 0.8  --  0.7 0.8   Albumin g/dL 3.9  --   --   --    Total Bilirubin mg/dL 0.2  --   --   --    Alkaline Phosphatase U/L 116  --   --   --    AST U/L 22  --   --   --    ALT U/L 19  --   --   --        Aminoglycoside Administrations:  aminoglycosides given in last 96 hours                     tobramycin (NEBCIN) 270 mg in dextrose 5 % (D5W) 100 mL IVPB (mg) 270 mg New Bag 05/11/24 3517                    Microbiologic Results:  Microbiology Results (last 7 days)       Procedure Component Value Units Date/Time    Blood Culture #2 **CANNOT BE ORDERED STAT** [[id number]] Collected: 05/10/24 2014    Order Status: Completed Specimen: Blood from Peripheral, Wrist, Right Updated: 05/12/24 0612     Blood Culture, Routine No Growth to date      No Growth to date    Blood Culture #1 **CANNOT BE ORDERED STAT** [[id number]] Collected: 05/10/24 2014    Order Status: Completed Specimen: Blood from Peripheral, Wrist, Left Updated: 05/12/24 0612     Blood Culture, Routine No Growth to date      No Growth to date    Urine culture [[id number]]  (Abnormal) Collected: 05/10/24 2032    Order Status: Completed Specimen: Urine Updated:  05/12/24 0001     Urine Culture, Routine GRAM NEGATIVE SAMEERA  >100,000 cfu/ml  Identification and susceptibility pending  No other significant isolate      Narrative:      Specimen Source->Urine

## 2024-05-12 NOTE — PROGRESS NOTES
[facility] ID note    Patient afebrile.  Got 1 dose of tobramycin.  Could receive 2nd dose and then be transferred back to nursing home    We will sign off    Please call if any questions   Mark Valle  [facility] ID  [phone]

## 2024-05-12 NOTE — PROGRESS NOTES
[facility] Medicine  Progress Note    Patient Name: Annette Garcia  MRN: [id number]  Patient Class: IP- Inpatient   Admission Date: 5/10/2024  Length of Stay: 1 days  Attending Physician: Soto Graves MD  Primary Care Provider: [facility]        Subjective:     Principal Problem:Complicated UTI (urinary tract infection)        HPI:  Annette Garcia is an 88 year old female w/ PMH seizure disorder, hypothyroidism, HTN, CAD, chronic anticoagulation, DLBCL, and anemia. She was sent into the ED from her nursing home for a urine cx positive for pseudomonas. She has been taking Bactrim and was due to finish that course on 5/14. She denies any fever, chills, N/V, abd pain, flank pain, or any urinary sx except incontinence.    Overview/Hospital Course:  5/11/24 per ID recommendation continue cefepime for the moment to complete 7 day course  Monitor cultures  Assure that patient is receiving estrogen application at least twice a week  Check postvoid residual   5/12 has finished course of 2 days of tobramycin. Per ID no need for more days of ABx. Plan to DC in AM to NH in AM  Repeat UA positive for gram negative Rods susceptibility pending, blood Cx still NGT    Interval History:   Stated feeling great, tolerate first dose of tobramycin, without acute event, no diarrhea, no CP or SOB, repeat positive for gram negative rods    Review of Systems   Constitutional:  Negative for activity change, appetite change, chills and fever.   Respiratory:  Negative for cough, chest tightness and shortness of breath.    Cardiovascular:  Negative for chest pain, palpitations and leg swelling.   Gastrointestinal:  Negative for abdominal pain, diarrhea, nausea and vomiting.   Genitourinary:  Negative for difficulty urinating and dysuria.   Musculoskeletal:  Negative for arthralgias, back pain and gait problem.   Neurological:  Negative for speech difficulty and weakness.   Psychiatric/Behavioral:   Negative for agitation, confusion and hallucinations.    All other systems reviewed and are negative.    Objective:     Vital Signs (Most Recent):  Temp: 98.3 °F (36.8 °C) (05/12/24 1139)  Pulse: 76 (05/12/24 1139)  Resp: 18 (05/12/24 1139)  BP: (!) 117/53 (05/12/24 1139)  SpO2: 98 % (05/12/24 1139) Vital Signs (24h Range):  Temp:  [97.5 °F (36.4 °C)-98.3 °F (36.8 °C)] 98.3 °F (36.8 °C)  Pulse:  [] 76  Resp:  [18-19] 18  SpO2:  [95 %-99 %] 98 %  BP: (113-158)/(53-70) 117/53     Weight: 66 kg (145 lb 8.1 oz)  Body mass index is 31.49 kg/m².    Intake/Output Summary (Last 24 hours) at 5/12/2024 1520  Last data filed at 5/12/2024 0509  Gross per 24 hour   Intake --   Output 400 ml   Net -400 ml         Physical Exam  Vitals and nursing note reviewed.   Constitutional:       General: She is not in acute distress.     Appearance: She is not toxic-appearing.   HENT:      Head: Normocephalic and atraumatic.      Mouth/Throat:      Mouth: Mucous membranes are moist.   Eyes:      Extraocular Movements: Extraocular movements intact.      Pupils: Pupils are equal, round, and reactive to light.   Cardiovascular:      Rate and Rhythm: Normal rate.      Heart sounds: No murmur heard.     No friction rub. No gallop.   Pulmonary:      Effort: Pulmonary effort is normal. No respiratory distress.      Breath sounds: No wheezing or rales.   Chest:      Chest wall: No tenderness.   Abdominal:      General: Bowel sounds are normal. There is no distension.      Palpations: Abdomen is soft.      Tenderness: There is no abdominal tenderness. There is no guarding or rebound.   Musculoskeletal:      Cervical back: No rigidity or tenderness.      Right lower leg: No edema.      Left lower leg: No edema.   Skin:     Findings: No erythema.   Neurological:      General: No focal deficit present.      Mental Status: She is alert and oriented to person, place, and time.      Motor: No weakness.      Coordination: Coordination normal.  "  Psychiatric:         Thought Content: Thought content normal.             Significant Labs: All pertinent labs within the past 24 hours have been reviewed.  Blood Culture:   Recent Labs   Lab 05/10/24  2014   LABBLOO No Growth to date  No Growth to date  No Growth to date  No Growth to date     BMP:   Recent Labs   Lab 05/12/24  0533   GLU 97   *   K 3.8   CL 97   CO2 27   BUN 12   CREATININE 0.8   CALCIUM 9.1     CBC:   Recent Labs   Lab 05/10/24  2014 05/11/24  0433 05/12/24  0533   WBC 4.56 3.52* 4.26   HGB 11.5* 10.0* 10.9*   HCT 33.9* 29.7* 32.8*    145* 159     CMP:   Recent Labs   Lab 05/10/24  2014 05/11/24  0433 05/12/24  0533   * 135* 133*   K 4.5 3.8 3.8   CL 95 99 97   CO2 28 28 27    89 97   BUN 15 11 12   CREATININE 0.8 0.7 0.8   CALCIUM 9.7 9.2 9.1   PROT 7.4  --   --    ALBUMIN 3.9  --   --    BILITOT 0.2  --   --    ALKPHOS 116  --   --    AST 22  --   --    ALT 19  --   --    ANIONGAP 11 8 9     Lipid Panel: No results for input(s): "CHOL", "HDL", "LDLCALC", "TRIG", "CHOLHDL" in the last 48 hours.  Respiratory Culture: No results for input(s): "GSRESP", "RESPIRATORYC" in the last 48 hours.  Urine Culture:   Recent Labs   Lab 05/10/24  2032   LABURIN GRAM NEGATIVE SAMEERA  >100,000 cfu/ml  Identification and susceptibility pending  No other significant isolate  *     Urine Studies:   Recent Labs   Lab 05/10/24  2032   COLORU Orange*   APPEARANCEUA Cloudy*   PHUR 7.0   SPECGRAV 1.010   PROTEINUA 2+*   GLUCUA Negative   KETONESU Negative   BILIRUBINUA Negative   OCCULTUA 2+*   NITRITE Positive*   UROBILINOGEN Negative   LEUKOCYTESUR 3+*   RBCUA >100*   WBCUA >100*   BACTERIA Many*   SQUAMEPITHEL 4   HYALINECASTS 0     Recent Lab Results         05/12/24 0533        Anion Gap 9       Baso # 0.04       Basophil % 0.9       BUN 12       Calcium 9.1       Chloride 97       CO2 27       Creatinine 0.8       Differential Method Automated       eGFR >60       Eos # 0.3       Eos % " 7.0       Glucose 97       Gran # (ANC) 2.6       Gran % 61.8       Hematocrit 32.8       Hemoglobin 10.9       Immature Grans (Abs) 0.00  Comment: Mild elevation in immature granulocytes is non specific and   can be seen in a variety of conditions including stress response,   acute inflammation, trauma and pregnancy. Correlation with other   laboratory and clinical findings is essential.         Immature Granulocytes 0.0       Lymph # 0.7       Lymph % 17.4       MCH 32.1       MCHC 33.2       MCV 97       Mono # 0.6       Mono % 12.9       MPV 9.9       nRBC 0       Platelet Count 159       Potassium 3.8       RBC 3.40       RDW 13.2       Sodium 133       Tobramycin, Random 5.6  Comment: Therapeutic range not established for random samples.       WBC 4.26               Significant Imaging: I have reviewed all pertinent imaging results/findings within the past 24 hours.    Assessment/Plan:      * Complicated UTI (urinary tract infection)  Urine cx positive for pseudomonas  Sensitive to cefepime  Allergy listed to PCN and keflex but patient has tolerated cephalosporins in the past  VERY poor vasculature so PICC line ordered to be placed in ED to start cefepime  Dose not given yet, awaiting PICC  Bactrim started 5/7 (initially scheduled to end 5/14)  Per recommendation   continue cefepime for the moment to complete 7 day course  Monitor cultures  Assure that patient is receiving estrogen application at least twice a week  Check postvoid residual   Then ID recommendation it's for tobramycin x 2 days complete and patient does not need more days of IV Abx.    Chronic anticoagulation  Eliquis      Coronary artery disease  Patient with known CAD s/p stent placement, which is controlled Will continue Statin and monitor for S/Sx of angina/ACS. Continue to monitor on telemetry.     Hyponatremia  Patient has hyponatremia which is controlled,We will aim to correct the sodium by 4-6mEq in 24 hours. We will monitor sodium  Daily. The hyponatremia is due to Dehydration/hypovolemia. We will obtain the following studies: NA. We will treat the hyponatremia with cont monitor and diet. The patient's sodium results have been reviewed and are listed below.  Recent Labs   Lab 05/11/24  0433   *       Anemia due to antineoplastic chemotherapy  Patient's anemia is currently controlled. Has not received any PRBCs to date. 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    Hypertension  Chronic, controlled. Latest blood pressure and vitals reviewed-     Temp:  [97.3 °F (36.3 °C)-98.7 °F (37.1 °C)]   Pulse:  [63-84]   Resp:  [17-18]   BP: (138-170)/(61-76)   SpO2:  [92 %-98 %] .   Home meds for hypertension were reviewed and noted below.   Hypertension Medications               furosemide (LASIX) 20 MG tablet Take 20 mg by mouth once daily.    losartan (COZAAR) 25 MG tablet Take 25 mg by mouth once daily.    metoprolol tartrate (LOPRESSOR) 25 MG tablet Take 25 mg by mouth 2 (two) times daily.            While in the hospital, will manage blood pressure as follows; Continue home antihypertensive regimen    Will utilize p.r.n. blood pressure medication only if patient's blood pressure greater than 180/110 and she develops symptoms such as worsening chest pain or shortness of breath.    Hypothyroidism  Continue synthroid      Seizure disorder  Continue trileptal and phenobarbital  Seizure precautions        VTE Risk Mitigation (From admission, onward)           Ordered     apixaban tablet 2.5 mg  2 times daily         05/11/24 0010     Reason for No Pharmacological VTE Prophylaxis  Once        Question:  Reasons:  Answer:  Already adequately anticoagulated on oral Anticoagulants     05/10/24 2311     IP VTE HIGH RISK PATIENT  Once         05/10/24 2311     Place sequential compression device  Until discontinued         05/10/24 2311                    Discharge Planning   JESSICA:      Code Status: DNR   Is the patient medically ready for discharge?:     Reason for patient still in hospital (select all that apply): Pending disposition  Discharge Plan A: Return to nursing home        Prior to DC will discuss with ID regarding further ABx recommendation       Time spent > 30 min    Soto Graves MD  Department of [facility] Medicine   [facility]

## 2024-05-12 NOTE — PLAN OF CARE
Problem: Adult Inpatient Plan of Care  Goal: Plan of Care Review  Outcome: Progressing     Problem: Infection  Goal: Absence of Infection Signs and Symptoms  Outcome: Progressing     Problem: UTI (Urinary Tract Infection)  Goal: Improved Infection Symptoms  Outcome: Progressing     Problem: Fall Injury Risk  Goal: Absence of Fall and Fall-Related Injury  Outcome: Progressing

## 2024-05-13 VITALS
HEIGHT: 57 IN | DIASTOLIC BLOOD PRESSURE: 56 MMHG | HEART RATE: 57 BPM | WEIGHT: 145.5 LBS | BODY MASS INDEX: 31.39 KG/M2 | OXYGEN SATURATION: 95 % | TEMPERATURE: 98 F | SYSTOLIC BLOOD PRESSURE: 119 MMHG | RESPIRATION RATE: 20 BRPM

## 2024-05-13 LAB
ANION GAP SERPL CALC-SCNC: 11 MMOL/L (ref 8–16)
BASOPHILS # BLD AUTO: 0.02 K/UL (ref 0–0.2)
BASOPHILS NFR BLD: 0.4 % (ref 0–1.9)
BUN SERPL-MCNC: 23 MG/DL (ref 8–23)
CALCIUM SERPL-MCNC: 8.8 MG/DL (ref 8.7–10.5)
CHLORIDE SERPL-SCNC: 95 MMOL/L (ref 95–110)
CO2 SERPL-SCNC: 26 MMOL/L (ref 23–29)
CREAT SERPL-MCNC: 1.2 MG/DL (ref 0.5–1.4)
DIFFERENTIAL METHOD BLD: ABNORMAL
EOSINOPHIL # BLD AUTO: 0.3 K/UL (ref 0–0.5)
EOSINOPHIL NFR BLD: 6.4 % (ref 0–8)
ERYTHROCYTE [DISTWIDTH] IN BLOOD BY AUTOMATED COUNT: 13.3 % (ref 11.5–14.5)
EST. GFR  (NO RACE VARIABLE): 44 ML/MIN/1.73 M^2
GLUCOSE SERPL-MCNC: 100 MG/DL (ref 70–110)
HCT VFR BLD AUTO: 28.9 % (ref 37–48.5)
HGB BLD-MCNC: 9.7 G/DL (ref 12–16)
IMM GRANULOCYTES # BLD AUTO: 0.01 K/UL (ref 0–0.04)
IMM GRANULOCYTES NFR BLD AUTO: 0.2 % (ref 0–0.5)
LYMPHOCYTES # BLD AUTO: 1.4 K/UL (ref 1–4.8)
LYMPHOCYTES NFR BLD: 28.5 % (ref 18–48)
MCH RBC QN AUTO: 32.3 PG (ref 27–31)
MCHC RBC AUTO-ENTMCNC: 33.6 G/DL (ref 32–36)
MCV RBC AUTO: 96 FL (ref 82–98)
MONOCYTES # BLD AUTO: 0.6 K/UL (ref 0.3–1)
MONOCYTES NFR BLD: 12.1 % (ref 4–15)
NEUTROPHILS # BLD AUTO: 2.6 K/UL (ref 1.8–7.7)
NEUTROPHILS NFR BLD: 52.4 % (ref 38–73)
NRBC BLD-RTO: 0 /100 WBC
PLATELET # BLD AUTO: 150 K/UL (ref 150–450)
PMV BLD AUTO: 9.7 FL (ref 9.2–12.9)
POTASSIUM SERPL-SCNC: 4.3 MMOL/L (ref 3.5–5.1)
RBC # BLD AUTO: 3 M/UL (ref 4–5.4)
SODIUM SERPL-SCNC: 132 MMOL/L (ref 136–145)
TOBRAMYCIN TROUGH SERPL-MCNC: 2 UG/ML (ref 0–2)
WBC # BLD AUTO: 4.87 K/UL (ref 3.9–12.7)

## 2024-05-13 PROCEDURE — 25000003 PHARM REV CODE 250: Performed by: NURSE PRACTITIONER

## 2024-05-13 PROCEDURE — 80048 BASIC METABOLIC PNL TOTAL CA: CPT | Performed by: NURSE PRACTITIONER

## 2024-05-13 PROCEDURE — 25000003 PHARM REV CODE 250: Performed by: HOSPITALIST

## 2024-05-13 PROCEDURE — 85025 COMPLETE CBC W/AUTO DIFF WBC: CPT | Performed by: NURSE PRACTITIONER

## 2024-05-13 PROCEDURE — 25000003 PHARM REV CODE 250: Performed by: FAMILY MEDICINE

## 2024-05-13 PROCEDURE — A4216 STERILE WATER/SALINE, 10 ML: HCPCS | Performed by: HOSPITALIST

## 2024-05-13 RX ORDER — MUPIROCIN 20 MG/G
OINTMENT TOPICAL 2 TIMES DAILY
Status: DISCONTINUED | OUTPATIENT
Start: 2024-05-13 | End: 2024-05-13 | Stop reason: HOSPADM

## 2024-05-13 RX ADMIN — MUPIROCIN: 20 OINTMENT TOPICAL at 09:05

## 2024-05-13 RX ADMIN — Medication 10 ML: at 11:05

## 2024-05-13 RX ADMIN — APIXABAN 2.5 MG: 2.5 TABLET, FILM COATED ORAL at 09:05

## 2024-05-13 RX ADMIN — Medication 10 ML: at 12:05

## 2024-05-13 RX ADMIN — FUROSEMIDE 20 MG: 20 TABLET ORAL at 09:05

## 2024-05-13 RX ADMIN — LEVOTHYROXINE SODIUM 125 MCG: 125 TABLET ORAL at 06:05

## 2024-05-13 RX ADMIN — METOPROLOL TARTRATE 25 MG: 25 TABLET, FILM COATED ORAL at 09:05

## 2024-05-13 RX ADMIN — PANTOPRAZOLE SODIUM 40 MG: 40 TABLET, DELAYED RELEASE ORAL at 09:05

## 2024-05-13 RX ADMIN — LOSARTAN POTASSIUM 25 MG: 25 TABLET, FILM COATED ORAL at 09:05

## 2024-05-13 RX ADMIN — Medication 10 ML: at 06:05

## 2024-05-13 NOTE — PLAN OF CARE
05/13/24 0842   Post-Acute Status   Post-Acute Authorization Placement   Post-Acute Placement Status Pending post-acute provider review/more information requested     Discharge Order  5/13/2024(Afternoon)  Expected Discharge Comment:  Signed by Christie Rainey MD  Medical Readiness for Discharge  Medically ready as of 5/13/2024 at 0834.  Updated by Christie Rainey MD on 5/13/2024 at 0834.  Expected Discharge: 5/13/2024Midday  Expected Discharge Comment:Return to NH today. Orders requested to send to facility for review. Updated clinicals sent.    Future Appointments   Date Time Provider Department Center   5/16/2024  9:30 AM Mari Clark FNP [facility] UROLOGY [facility]      Follow-up Information       Mari Clark FNP. Schedule an appointment as soon as possible for a visit in 3 day(s).    Specialty: Urology  Why: For discharge from hospital follow up, Culture results  Contact information:  [address] [place] [address]  [phone]                           Orders Placed This Encounter   Procedures    Ambulatory referral/consult to Urology     Standing Status:   Future     Standing Expiration Date:   6/13/2025     Referral Priority:   Urgent     Referral Type:   Consultation     Referral Reason:   Specialty Services Required     Referred to Provider:   Mari Clark FNP     Requested Specialty:   Urology     Number of Visits Requested:   1

## 2024-05-13 NOTE — ASSESSMENT & PLAN NOTE
Patient's anemia is currently controlled. Has not received any PRBCs to date. 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

## 2024-05-13 NOTE — PLAN OF CARE
Problem: Adult Inpatient Plan of Care  Goal: Plan of Care Review  Outcome: Progressing  Goal: Patient-Specific Goal (Individualized)  Outcome: Progressing  Goal: Absence of Hospital-Acquired Illness or Injury  Outcome: Progressing  Goal: Optimal Comfort and Wellbeing  Outcome: Progressing  Goal: Readiness for Transition of Care  Outcome: Progressing     Problem: UTI (Urinary Tract Infection)  Goal: Improved Infection Symptoms  Outcome: Progressing     Problem: Fall Injury Risk  Goal: Absence of Fall and Fall-Related Injury  Outcome: Progressing     Problem: Skin Injury Risk Increased  Goal: Skin Health and Integrity  Outcome: Progressing

## 2024-05-13 NOTE — ASSESSMENT & PLAN NOTE
Urine cx positive for pseudomonas  Sensitive to cefepime  Allergy listed to PCN and keflex but patient has tolerated cephalosporins in the past  VERY poor vasculature so PICC line ordered to be placed in ED to start cefepime  Bactrim started 5/7 (initially scheduled to end 5/14)  Per ID recommendation   continue cefepime for the moment to complete 7 day course  Monitor cultures  Assure that patient is receiving estrogen application at least twice a week  Check postvoid residual   ID recommendation changed to tobramycin x 2 days - completed and patient does not need more days of IV Abx.  -- previously on 90-day treatment with Macrobid until Thu 5/16/2024 per Urololgy

## 2024-05-13 NOTE — DISCHARGE SUMMARY
"[facility]  Telemedicine Discharge Summary      Patient Name: Annette Garcia  MRN: [id number]  Patient Class: IP- Inpatient  Admission Date: 5/10/2024  Hospital Length of Stay: 2 days  Discharge Date and Time: 5/13/2024  2:10 PM  Attending Physician: Christie Rainey MD   Discharging Provider: Christie Rainey MD  Primary Care Provider: [facility]      HPI:   Annette Garcia is an 88 year old female w/ PMH seizure disorder, hypothyroidism, HTN, CAD, chronic anticoagulation, DLBCL, and anemia. She was sent into the ED from her nursing home for a urine cx positive for pseudomonas. She has been taking Bactrim and was due to finish that course on 5/14. She denies any fever, chills, N/V, abd pain, flank pain, or any urinary sx except incontinence.    * No surgery found *      Hospital Course:   ID consulted. Assure that patient is receiving estrogen application at least twice a week.   5/12 has finished course of 2 days of tobramycin. Per ID no need for more days of ABx.   Urine culture on admission positive for GNR, susceptibility pending, Blood culture NGTD.   PICC line to be removed at discharge.    See Problems listed below for additional details of this hospital stay.      Goals of Care Treatment Preferences:  Code Status: DNR    Living Will: Yes            Consults:   Consults (From admission, onward)          Status Ordering Provider     Inpatient virtual consult to Hospital Medicine  Once        Provider:  (Not yet assigned)    Completed SHAUN CALL     Pharmacy to dose Aminoglycosides consult  Once        Provider:  (Not yet assigned)   Placed in "And" Linked Group    Acknowledged SHAUN CALL     Inpatient consult to Infectious Diseases  Once        Provider:  (Not yet assigned)    Completed SHAUN CALL     Inpatient consult to PICC team (NIAS)  Once        Provider:  (Not yet assigned)    Acknowledged ALEX LO JR            Neuro  Seizure " disorder  Continue trileptal and phenobarbital  Seizure precautions      Cardiac/Vascular  Coronary artery disease  Patient with known CAD s/p stent placement, which is controlled Will continue Statin and monitor for S/Sx of angina/ACS. Continue to monitor on telemetry.     Hypertension  Chronic, controlled. Latest blood pressure and vitals reviewed-     Temp:  [97.6 °F (36.4 °C)-98.3 °F (36.8 °C)]   Pulse:  [57-79]   Resp:  [16-20]   BP: (103-122)/(49-56)   SpO2:  [93 %-98 %] .   Home meds for hypertension were reviewed and noted below.   Hypertension Medications               furosemide (LASIX) 20 MG tablet Take 20 mg by mouth once daily.    losartan (COZAAR) 25 MG tablet Take 25 mg by mouth once daily.    metoprolol tartrate (LOPRESSOR) 25 MG tablet Take 25 mg by mouth 2 (two) times daily.     While in the hospital, will manage blood pressure as follows; Continue home antihypertensive regimen    Will utilize p.r.n. blood pressure medication only if patient's blood pressure greater than 180/110 and she develops symptoms such as worsening chest pain or shortness of breath.    Renal/  * Complicated UTI (urinary tract infection)  Urine cx positive for pseudomonas  Sensitive to cefepime  Allergy listed to PCN and keflex but patient has tolerated cephalosporins in the past  VERY poor vasculature so PICC line ordered to be placed in ED to start cefepime  Bactrim started 5/7 (initially scheduled to end 5/14)  Per ID recommendation   continue cefepime for the moment to complete 7 day course  Monitor cultures  Assure that patient is receiving estrogen application at least twice a week  Check postvoid residual   ID recommendation changed to tobramycin x 2 days - completed and patient does not need more days of IV Abx.  -- previously on 90-day treatment with Macrobid until Thu 5/16/2024 per Urololgy    Hematology  Chronic anticoagulation  Eliquis      Oncology  Anemia due to antineoplastic chemotherapy  Patient's anemia is  currently controlled. Has not received any PRBCs to date. 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    Endocrine  Hyponatremia  Patient has hyponatremia which is controlled,We will aim to correct the sodium by 4-6mEq in 24 hours. We will monitor sodium Daily. The hyponatremia is due to Dehydration/hypovolemia. We will obtain the following studies: NA. We will treat the hyponatremia with cont monitor and diet. The patient's sodium results have been reviewed and are listed below.  Recent Labs   Lab 05/13/24  0500   *         Hypothyroidism  Continue synthroid        Final Active Diagnoses:    Diagnosis Date Noted POA    PRINCIPAL PROBLEM:  Complicated UTI (urinary tract infection) [N39.0] 01/28/2024 Yes    Chronic anticoagulation [Z79.01] 08/20/2020 Not Applicable    Coronary artery disease [I25.10] 08/14/2020 Yes    Hyponatremia [E87.1] 01/26/2017 Yes    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, T45.1X5A] 12/27/2016 Yes     Chronic    Hypertension [I10] 12/05/2016 Yes    Seizure disorder [G40.909] 08/18/2016 Yes    Hypothyroidism [E03.9] 08/18/2016 Yes      Problems Resolved During this Admission:       Discharged Condition: stable    Disposition: Planned Readmission - NH    Follow Up:   Follow-up Information       Mari Clark FNP. Schedule an appointment as soon as possible for a visit in 3 day(s).    Specialty: Urology  Why: For discharge from hospital follow up, Culture results  Contact information:  [address] [place] [address]  [phone]                           Future Appointments   Date Time Provider Department Center   5/16/2024  9:30 AM Mari Clark FNP [facility] UROLOGY [facility]       Patient Instructions:       Ambulatory referral/consult to Urology   Standing Status: Future   Referral Priority: Urgent Referral Type: Consultation   Referral Reason: Specialty Services Required   Referred to Provider: DEJUAN FERRERA Requested Specialty: Urology   Number of Visits Requested: 1     Diet Cardiac     Activity as tolerated       Significant Diagnostic Studies:   Recent Labs   Lab 05/11/24 0433 05/12/24 0533 05/13/24  0500   WBC 3.52* 4.26 4.87   HGB 10.0* 10.9* 9.7*   HCT 29.7* 32.8* 28.9*   * 159 150     Recent Labs   Lab 05/11/24 0433 05/12/24 0533 05/13/24  0500   GRAN 44.5  1.6* 61.8  2.6 52.4  2.6   LYMPH 32.1  1.1 17.4*  0.7* 28.5  1.4   MONO 14.8  0.5 12.9  0.6 12.1  0.6   EOS 0.3 0.3 0.3     Recent Labs   Lab 05/10/24  2014 05/11/24  0433 05/12/24  0533 05/13/24  0500   * 135* 133* 132*   K 4.5 3.8 3.8 4.3   CL 95 99 97 95   CO2 28 28 27 26   BUN 15 11 12 23   CREATININE 0.8 0.7 0.8 1.2    89 97 100   CALCIUM 9.7 9.2 9.1 8.8   ALBUMIN 3.9  --   --   --      Recent Labs   Lab 05/10/24  2014   ALKPHOS 116   ALT 19   AST 22   PROT 7.4   BILITOT 0.2     Recent Labs   Lab 01/28/24  1721 05/10/24  2014   LACTATE 1.8 2.0     Microbiology Results (last 7 days)       Procedure Component Value Units Date/Time    Blood Culture #2 **CANNOT BE ORDERED STAT** [[id number]] Collected: 05/10/24 2014    Order Status: Completed Specimen: Blood from Peripheral, Wrist, Right Updated: 05/13/24 0613     Blood Culture, Routine No Growth to date      No Growth to date      No Growth to date    Blood Culture #1 **CANNOT BE ORDERED STAT** [[id number]] Collected: 05/10/24 2014    Order Status: Completed Specimen: Blood from Peripheral, Wrist, Left Updated: 05/13/24 0613     Blood Culture, Routine No Growth to date      No Growth to date      No Growth to date           SARS-CoV-2 RNA, Amplification, Qual (no units)   Date Value   03/28/2023 Negative   09/06/2022 Negative   09/13/2020 Negative   08/14/2020 Negative      POC Rapid COVID (no units)   Date Value   10/11/2022 Negative   08/29/2022 Negative   05/21/2022 Negative     Results for orders placed during the hospital encounter of 12/15/23    Echo    Interpretation Summary    Left Ventricle: The left ventricle is normal in size. There is concentric remodeling. Normal wall motion. There is normal systolic function. Biplane (2D) method of discs ejection fraction is 65%. There is normal diastolic function.    Right Ventricle: Normal right ventricular cavity size. Systolic function is normal. TAPSE is 1.80 cm.    Aortic Valve: There is mild to moderate stenosis. Aortic valve area by VTI is 1.49 cm². Aortic valve peak velocity is 1.59 m/s. Mean gradient is 5 mmHg. The dimensionless index is 0.48.    Mitral Valve: There is no stenosis. The mean pressure gradient across the mitral valve is 2 mmHg at a heart rate of  bpm. There is mild regurgitation.    IVC/SVC: Normal venous pressure at 3 mmHg.    : There is no pericardial effusion.      X-Ray Chest AP Portable  Narrative: EXAMINATION:  XR CHEST AP PORTABLE    CLINICAL HISTORY:  PICC;    TECHNIQUE:  Single frontal view of the chest was performed.    COMPARISON:  01/28/2024    FINDINGS:  Interval placement of a right upper extremity PICC line with tip projecting over the region of the SVC.  The cardiomediastinal silhouette is within normal limits of size and configuration noting atherosclerosis of the thoracic aorta.  The lungs are symmetrically expanded with diffuse coarse interstitial lung markings, unchanged from prior exam.  No definite evidence of confluent airspace consolidation, substantial volume of pleural fluid or pneumothorax.  Osseous structures are intact with degenerative change.  Impression: Please see above.    Electronically signed by: Soumya Smith MD  Date:    05/11/2024  Time:    02:04      Labs and Imaging listed above were reviewed.        Medications:  Reconciled Home Medications:      Medication List         CONTINUE taking these medications      acetaminophen 325 MG tablet  Commonly known as: TYLENOL  Take 2 tablets (650 mg total) by mouth every 4 (four) hours as needed for Pain.     apixaban 2.5 mg Tab  Commonly known as: ELIQUIS  Take 1 tablet (2.5 mg total) by mouth 2 (two) times daily.     calcium-vitamin D 600 mg-10 mcg (400 unit) Tab  Take 1 tablet by mouth once daily.     docusate sodium 100 MG capsule  Commonly known as: COLACE  Take 1 capsule (100 mg total) by mouth 2 (two) times daily.     estradioL 0.01 % (0.1 mg/gram) vaginal cream  Commonly known as: ESTRACE  Place 2 g vaginally twice a week.     furosemide 20 MG tablet  Commonly known as: LASIX  Take 20 mg by mouth once daily.     levothyroxine 125 MCG tablet  Commonly known as: SYNTHROID  TAKE 1 TABLET (125 MCG TOTAL) BY MOUTH ONCE DAILY.     losartan 25 MG tablet  Commonly known as: COZAAR  Take 25 mg by mouth once daily.     metoprolol tartrate 25 MG tablet  Commonly known as: LOPRESSOR  Take 25 mg by mouth 2 (two) times daily.     ondansetron 4 MG tablet  Commonly known as: ZOFRAN  Take 4 mg by mouth every 6 (six) hours as needed for Nausea (vomiting).     OXcarbazepine 150 MG Tab  Commonly known as: TRILEPTAL  Take 150 mg by mouth nightly.     pantoprazole 40 MG tablet  Commonly known as: PROTONIX  Take 40 mg by mouth once daily.     PHENobarbitaL 97.2 MG tablet  Take 97.2 mg by mouth every evening.     polyethylene glycol 17 gram Pwpk  Commonly known as: GLYCOLAX  Take 17 g by mouth once daily.     pravastatin 20 MG tablet  Commonly known as: PRAVACHOL  Take 20 mg by mouth every evening.     vitamin D 1000 units Tab  Commonly known as: VITAMIN D3  Take 1,000 Units by mouth once daily.            STOP taking these medications      magnesium oxide 400 mg (241.3 mg magnesium) tablet  Commonly known as: MAG-OX     nitrofurantoin (macrocrystal-monohydrate) 100 MG capsule  Commonly known as: MACROBID              Indwelling Lines/Drains at time of  discharge:   Lines/Drains/Airways       Peripherally Inserted Central Catheter Line  Duration             PICC Double Lumen 05/11/24 0025 right basilic 2 days              Drain  Duration                  Colostomy 10/29/19 1200 LLQ 1658 days    Female External Urinary Catheter w/ Suction 05/10/24 2304 2 days           Time spent on the discharge of patient: 50 minutes  This service was provided by Virtual Visit without a Telepresenter.   Patient was seen and examined on the date of discharge.  Additional time was spent speaking with consultants and case management, reviewing records, and/or discussing the plan of care with patient/family.    The patient location: K465/K[address] A  Admitted 5/10/2024  6:06 PM  Patient was [facility] to [facility] on:  5/13/2024  This document was partially prepared by chart review and may not directly reflect my personal knowledge of the patient's case, clinical course, or significant events during the hospital stay.    The attending portion of this evaluation, treatment, and documentation was performed per Christie Rainey MD via Telemedicine AudioVisual using the secure [employer] software platform with 2 way audio/video. The provider was located off-site and the patient is located in the hospital. The aforementioned video software was utilized to document the relevant history and physical exam    Christie Rainey MD  Department of [facility] Medicine  [facility]

## 2024-05-13 NOTE — PLAN OF CARE
Problem: Infection  Goal: Absence of Infection Signs and Symptoms  Outcome: Met     Problem: Adult Inpatient Plan of Care  Goal: Plan of Care Review  5/13/2024 1217 by Karlee Wheeler RN  Outcome: Met  5/13/2024 0757 by Karlee Wheeler RN  Outcome: Progressing  Goal: Patient-Specific Goal (Individualized)  Outcome: Met  Goal: Absence of Hospital-Acquired Illness or Injury  Outcome: Met  Goal: Optimal Comfort and Wellbeing  Outcome: Met  Goal: Readiness for Transition of Care  Outcome: Met     Problem: Wound  Goal: Optimal Coping  Outcome: Met  Goal: Optimal Functional Ability  Outcome: Met  Goal: Absence of Infection Signs and Symptoms  Outcome: Met  Goal: Improved Oral Intake  Outcome: Met  Goal: Optimal Pain Control and Function  Outcome: Met  Goal: Skin Health and Integrity  Outcome: Met  Goal: Optimal Wound Healing  Outcome: Met     Problem: UTI (Urinary Tract Infection)  Goal: Improved Infection Symptoms  Outcome: Met     Problem: Fall Injury Risk  Goal: Absence of Fall and Fall-Related Injury  Outcome: Met     Problem: Comorbidity Management  Goal: Blood Pressure in Desired Range  Outcome: Met  Goal: Maintenance of Osteoarthritis Symptom Control  Outcome: Met  Goal: Maintenance of Seizure Control  Outcome: Met     Problem: Skin Injury Risk Increased  Goal: Skin Health and Integrity  Outcome: Met

## 2024-05-13 NOTE — ASSESSMENT & PLAN NOTE
Patient has hyponatremia which is controlled,We will aim to correct the sodium by 4-6mEq in 24 hours. We will monitor sodium Daily. The hyponatremia is due to Dehydration/hypovolemia. We will obtain the following studies: NA. We will treat the hyponatremia with cont monitor and diet. The patient's sodium results have been reviewed and are listed below.  Recent Labs   Lab 05/13/24  0500   *

## 2024-05-13 NOTE — ASSESSMENT & PLAN NOTE
Chronic, controlled. Latest blood pressure and vitals reviewed-     Temp:  [97.6 °F (36.4 °C)-98.3 °F (36.8 °C)]   Pulse:  [57-79]   Resp:  [16-20]   BP: (103-122)/(49-56)   SpO2:  [93 %-98 %] .   Home meds for hypertension were reviewed and noted below.   Hypertension Medications               furosemide (LASIX) 20 MG tablet Take 20 mg by mouth once daily.    losartan (COZAAR) 25 MG tablet Take 25 mg by mouth once daily.    metoprolol tartrate (LOPRESSOR) 25 MG tablet Take 25 mg by mouth 2 (two) times daily.     While in the hospital, will manage blood pressure as follows; Continue home antihypertensive regimen    Will utilize p.r.n. blood pressure medication only if patient's blood pressure greater than 180/110 and she develops symptoms such as worsening chest pain or shortness of breath.

## 2024-05-13 NOTE — NURSING
Pt PICC discontinued per orders. Colostomy bag CDI. Wheelchair transport at bedside. Pt with all belongings.

## 2024-05-13 NOTE — PLAN OF CARE
[facility] - Telemetry  Discharge Final Note    Primary Care Provider: [facility]    Expected Discharge Date: 5/13/2024    Pt to DC back to [facility] today. Orders accepted. Report info communicated. Daughter is aware and will transport to Uro appt on Thursday.    DC plan as listed below in CM flowsheet.      Final Discharge Note (most recent)       Final Note - 05/13/24 1206          Final Note    Assessment Type Final Discharge Note (P)      Anticipated Discharge Disposition alf Nursing Facility (P)      Hospital Resources/Appts/Education Provided Appointments scheduled and added to AVS (P)         Post-Acute Status    Post-Acute Authorization Placement (P)      Post-Acute Placement Status Set-up Complete/Auth obtained (P)    Updated orders alerted. Report can be called for bed 115A to [phone].    Discharge Delays Ambulance Transport/Facility Transport (P)    [facility] van to transport back to NH. Pt agreeable to transport with [facility] van. PICC to be removed prior to DC.                    Important Message from Medicare  Important Message from Medicare regarding Discharge Appeal Rights: Explained to patient/caregiver, Given to patient/caregiver, Signed/date by patient/caregiver     Date IMM was signed: 05/13/24  Time IMM was signed: 1137  Future Appointments   Date Time Provider Department [facility]   5/16/2024  9:30 AM Mari Clark FNP [facility] UROLOGY [facility]       Contact Info       Mari Clark FNP   Specialty: Urology    [address] [place] [address]   Phone: [phone]       Next Steps: Schedule an appointment as soon as possible for a visit in 3 day(s)    Instructions: For discharge from hospital follow up, Culture results          Orders Placed This Encounter   Procedures    Ambulatory referral/consult to Urology     Standing Status:   Future     Standing Expiration Date:   6/13/2025     Referral Priority:   Urgent     Referral Type:   Consultation     Referral  Reason:   Specialty Services Required     Referred to Provider:   Mari Clark FNP     Requested Specialty:   Urology     Number of Visits Requested:   1

## 2024-05-13 NOTE — PLAN OF CARE
NURSING HOME ORDERS    05/13/2024  [facility] - TELEMETRY  [address] [place] [address]  Dept: [phone]  Loc: [phone]     Admit to Nursing Home:  Planned Readmission - NH    Diagnoses:  Active Hospital Problems    Diagnosis  POA    *Complicated UTI (urinary tract infection) [N39.0]  Yes    Chronic anticoagulation [Z79.01]  Not Applicable    Coronary artery disease [I25.10]  Yes    Hyponatremia [E87.1]  Yes    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, T45.1X5A]  Yes     Chronic    Hypertension [I10]  Yes    Seizure disorder [G40.909]  Yes     Epilepsy type unknown      Hypothyroidism [E03.9]  Yes      Resolved Hospital Problems   No resolved problems to display.       Patient is homebound due to:  Complicated UTI (urinary tract infection)    Allergies:  Review of patient's allergies indicates:   Allergen Reactions    Adhesive Itching and Blisters    Penicillins Anaphylaxis    Tramadol Hives    Avelox [moxifloxacin] Rash     Facial and arm itching and redness. Pt states throat closes when given.    Amoxil [amoxicillin]     Aspridrox [aspirin, buffered]     Codeine Other (See Comments)     Throat swelling    Keflex [cephalexin]      Tolerated cefepime and cefazolin    Norvasc [amlodipine]     Red dye Hives    Robitussin [guaifenesin]     Sulfa (sulfonamide antibiotics)     Tylenol [acetaminophen]      Has reaction to Tylenol with red dye and unable to take Extra Strength Tylenol/ CAN ONLY TOLERATE REG STRENGTH TYLENOL    Vicks vaporub [camphor-eucalyptus oil-menthol]        Vitals:  Routine    Diet: cardiac diet    Activities:   Up in a chair each morning as tolerated and Activity as tolerated    Goals of Care Treatment Preferences:  Code Status: DNR    Living Will: Yes            Labs:  per facility protocol    Nursing Precautions:  Aspiration , Fall, Seizure, and Pressure ulcer prevention    Consults:   Wound Care and Nutrition to evaluate and recommend diet                     Medications: Discontinue all previous medication orders, if any. See new list below.    90 -day course of Macrobid to be completed as previously prescribed by Urology - End date on 5/16/2024  Potassium level currently normal without reported home med of KCL 10 mg (not on our home med list) - will defer to MICHEL BRAXTON.     Medication List        CONTINUE taking these medications      acetaminophen 325 MG tablet  Commonly known as: TYLENOL  Take 2 tablets (650 mg total) by mouth every 4 (four) hours as needed for Pain.     apixaban 2.5 mg Tab  Commonly known as: ELIQUIS  Take 1 tablet (2.5 mg total) by mouth 2 (two) times daily.     calcium-vitamin D 600 mg-10 mcg (400 unit) Tab  Take 1 tablet by mouth once daily.     docusate sodium 100 MG capsule  Commonly known as: COLACE  Take 1 capsule (100 mg total) by mouth 2 (two) times daily.     estradioL 0.01 % (0.1 mg/gram) vaginal cream  Commonly known as: ESTRACE  Place 2 g vaginally twice a week.     furosemide 20 MG tablet  Commonly known as: LASIX  Take 20 mg by mouth once daily.     levothyroxine 125 MCG tablet  Commonly known as: SYNTHROID  TAKE 1 TABLET (125 MCG TOTAL) BY MOUTH ONCE DAILY.     losartan 25 MG tablet  Commonly known as: COZAAR  Take 25 mg by mouth once daily.     metoprolol tartrate 25 MG tablet  Commonly known as: LOPRESSOR  Take 25 mg by mouth 2 (two) times daily.     nitrofurantoin (macrocrystal-monohydrate) 100 MG capsule  Commonly known as: MACROBID  Take 1 capsule (100 mg total) by mouth Daily.     ondansetron 4 MG tablet  Commonly known as: ZOFRAN  Take 4 mg by mouth every 6 (six) hours as needed for Nausea (vomiting).     OXcarbazepine 150 MG Tab  Commonly known as: TRILEPTAL  Take 150 mg by mouth nightly.     pantoprazole 40 MG tablet  Commonly known as: PROTONIX  Take 40 mg by mouth once daily.     PHENobarbitaL 97.2 MG tablet  Take 97.2 mg by mouth every evening.     polyethylene glycol 17 gram Pwpk  Commonly known as:  GLYCOLAX  Take 17 g by mouth once daily.     pravastatin 20 MG tablet  Commonly known as: PRAVACHOL  Take 20 mg by mouth every evening.     vitamin D 1000 units Tab  Commonly known as: VITAMIN D3  Take 1,000 Units by mouth once daily.            STOP taking these medications      magnesium oxide 400 mg (241.3 mg magnesium) tablet  Commonly known as: MAG-OX            Immunizations Administered as of 5/13/2024       No immunizations on file.          Future Appointments   Date Time Provider Department Center   5/16/2024  9:30 AM Mari Clark FNP [facility] UROLOGY [facility] Rainey MD  05/13/2024

## 2024-05-13 NOTE — CONSULTS
[facility] Medicine  Telemedicine Consult Note    Patient Name: Annette Garcia  MRN: [id number]  Admission Date: 5/10/2024  Hospital Length of Stay: 1 days  Attending Physician: Soto Graves MD   Primary Care Provider: [facility]           Thank you for your consult to [facility]. We have reviewed the patient chart. This patient does meet criteria for [facility] service at this time.  Will assume care on 05/13/24 at 7AM.          Christie Rainey MD  Department of [facility] Medicine   [facility]

## 2024-05-14 LAB — BACTERIA UR CULT: ABNORMAL

## 2024-05-16 ENCOUNTER — OFFICE VISIT (OUTPATIENT)
Dept: UROLOGY | Facility: CLINIC | Age: 89
End: 2024-05-16
Payer: MEDICARE

## 2024-05-16 DIAGNOSIS — R33.9 URINARY RETENTION: Primary | ICD-10-CM

## 2024-05-16 DIAGNOSIS — Z87.440 HISTORY OF RECURRENT UTIS: ICD-10-CM

## 2024-05-16 DIAGNOSIS — N39.0 COMPLICATED UTI (URINARY TRACT INFECTION): ICD-10-CM

## 2024-05-16 LAB
BACTERIA BLD CULT: NORMAL
BACTERIA BLD CULT: NORMAL
POC RESIDUAL URINE VOLUME: 416 ML (ref 0–100)

## 2024-05-16 PROCEDURE — 99999PBSHW POCT BLADDER SCAN: Mod: PBBFAC,,,

## 2024-05-16 PROCEDURE — 99999 PR PBB SHADOW E&M-EST. PATIENT-LVL IV: CPT | Mod: PBBFAC,,,

## 2024-05-16 PROCEDURE — 51798 US URINE CAPACITY MEASURE: CPT | Mod: PBBFAC,PO

## 2024-05-16 PROCEDURE — 87086 URINE CULTURE/COLONY COUNT: CPT

## 2024-05-16 PROCEDURE — 99215 OFFICE O/P EST HI 40 MIN: CPT | Mod: S$PBB,,,

## 2024-05-16 PROCEDURE — 99214 OFFICE O/P EST MOD 30 MIN: CPT | Mod: PBBFAC,PO

## 2024-05-16 NOTE — PROGRESS NOTES
Subjective:       Patient ID: Annette Garcia is a 88 y.o. female.    Chief Complaint: recurrent UTIs     This is a 88 y.o.  female patient that is new to me.  This patient is a nursing home patient and she has limited mobility, uses a wheelchair to get around and needs help wit normal daily activities. The patient was referred to me by hospitalist for recurrent Pseudomonas infection. History of urinary retention. Patient was recently in the hospital for SBO. Discharged on 2/3/24 with orders for IV cefepime for 2 weeks given through midline.   Patient has recurrent UTIs with 2 positive cultures within 6 months.  1/1/24 urine culture positive for Acinetobacter Baumannii/ Haemolyticus  1/28/24 urine culture positive for Pseudomonas Aergunosa.  Ct scan 1/28/24- Kidneys and urinary collecting systems: Normal. No hydronephrosis or urolithiasis. Urinary bladder: No wall thickening.  2/16/24: patient reports she is feeling much better, finished IV antibiotics and no longer has midline in left arm. Reports that she is continent but sometimes soils her depends when someone doesn't come fast enough to help her to the commode. Family member is with her and reports her symptoms are normally confusion and irritation, these have been resolved and she is back at her baseline per family member.   PVR after urinating 300cc - >374cc  Urine dipstick- postive for WBC, negative for RBC and nitrites.  2/16/24- urine culture negative  5/10/24- urine culture positive for pseudomonas aeruginosa >100,000, sent to ED by NH, ID consulted tx with tobramycin x2 days, instructed to use estrogen cream 2x per week.  5/16/24: Reports that she took the macrobid as prescribed but started having symptoms of confusion at NH in the beginning of the month. Urine culture came back positive as stated above and patient was sent to the ED, ED course stated above. Today patient denies dysuria, fever, chills, urinary frequency, urgency, suprapubic pain.  Daughter is here with her and she feel sshe is not at baseline cognitively and is concerned that the infection has not cleared completely. Patient reports that she can tell when she needs to urinate but holds in the urge to void since she has to wait for help to get to the bathroom, sometimes she urinates in her depends.   PVR 400ml immediately after urinating in clinic      Lab Results   Component Value Date    CREATININE 1.2 05/13/2024       ---  PMH/PSH/Medications/Allergies/Social history reviewed and as in chart.    Review of Systems   Constitutional:  Negative for chills and fever.   Respiratory:  Negative for shortness of breath.    Cardiovascular:  Negative for chest pain and palpitations.   Gastrointestinal:  Negative for abdominal pain, constipation and diarrhea.   Genitourinary:  Positive for difficulty urinating (urinates in depends if unable to get to cammode fast enough). Negative for dysuria, flank pain, frequency, hematuria, pelvic pain, urgency and vaginal pain.   Neurological:  Positive for weakness (uses a wheelchair). Negative for dizziness.   Psychiatric/Behavioral:  Negative for agitation, confusion and sleep disturbance.        Objective:      Physical Exam  HENT:      Head: Normocephalic.   Pulmonary:      Effort: Pulmonary effort is normal.   Genitourinary:     Vagina: Erythema and tenderness present.      Comments: Vaginal dryness present.  Patient cleaned with betadine and In and Out catheter placed, removed 300cc of urine.  Musculoskeletal:      Cervical back: Normal range of motion.   Neurological:      Mental Status: She is alert and oriented to person, place, and time.      Motor: Weakness present.       Assessment:     Problem Noted   History of Recurrent Utis 2/16/2024   Urinary Retention 11/4/2019    Gonzales removed 11/4/19, had 500 cc on bladder scan after several hours  PVR after voiding 300cc >374 on 2/16/24         Plan:     Discussed gonzales catheter placement vs behavioral  modifications to prevent UTIs and reduce post void residual. Patient and daughter both agree to continue behavioral modifications as patient is not symptomatic, crt is 1.2. Will get MARYLU.  Discussed possibilities of recurrent UTI's with patient and family including atrophic vaginitis, soiling depends, holding in the urge to void, and not emptying bladder completely. Instructed patient to double void when going to the bathroom, do not hold in the urge to void and to void in depends if needed, instructions given to NH to apply barrier cream at every diaper change. Start taking estrace cream 2x per week by applying to vaginal area. Patient and daughter voiced understanding.  Instructions given to NH.  Sent urine for culture, will message with results and start antibiotics if needed.  MARYLU ordered.  Referral placed for ID  Follow-up pending MARYLU and urine culture results.    DAVID Diaz    Time with patient was 40 minutes. This includes face to face time and non-face to face time preparing to see the patient (eg, review of tests), obtaining and/or reviewing separately obtained history, documenting clinical information in the electronic or other health record, independently interpreting results and communicating results to the patient/family/caregiver, or care coordinator.

## 2024-05-17 LAB — BACTERIA UR CULT: NO GROWTH

## 2024-05-20 ENCOUNTER — PATIENT MESSAGE (OUTPATIENT)
Dept: UROLOGY | Facility: CLINIC | Age: 89
End: 2024-05-20
Payer: MEDICARE

## 2024-05-23 ENCOUNTER — HOSPITAL ENCOUNTER (OUTPATIENT)
Dept: RADIOLOGY | Facility: HOSPITAL | Age: 89
Discharge: HOME | End: 2024-05-23
Payer: MEDICARE

## 2024-05-23 DIAGNOSIS — R33.9 URINARY RETENTION: ICD-10-CM

## 2024-05-23 PROCEDURE — 76770 US EXAM ABDO BACK WALL COMP: CPT | Mod: 26,,, | Performed by: RADIOLOGY

## 2024-05-23 PROCEDURE — 76770 US EXAM ABDO BACK WALL COMP: CPT | Mod: TC

## 2024-05-24 NOTE — ASSESSMENT & PLAN NOTE
Clinical Nutrition    Dietitian consult received per cardiac rehab standing order. Pt to be educated by cardiac rehab staff and encouraged to attend outpatient classes taught by RD. RD available PRN.    Tiffanie Callejas MS, RD, LDN  Clinical Dietitian  Ext: [phone]     Continue synthroid

## 2024-05-30 ENCOUNTER — PATIENT MESSAGE (OUTPATIENT)
Dept: UROLOGY | Facility: CLINIC | Age: 89
End: 2024-05-30
Payer: MEDICARE

## 2024-06-26 ENCOUNTER — OFFICE VISIT (OUTPATIENT)
Dept: INFECTIOUS DISEASES | Facility: CLINIC | Age: 89
End: 2024-06-26
Payer: MEDICARE

## 2024-06-26 VITALS
HEIGHT: 57 IN | SYSTOLIC BLOOD PRESSURE: 150 MMHG | TEMPERATURE: 98 F | DIASTOLIC BLOOD PRESSURE: 83 MMHG | HEART RATE: 67 BPM | WEIGHT: 160.94 LBS | BODY MASS INDEX: 34.72 KG/M2

## 2024-06-26 DIAGNOSIS — N39.0 COMPLICATED UTI (URINARY TRACT INFECTION): ICD-10-CM

## 2024-06-26 DIAGNOSIS — Z87.440 HISTORY OF RECURRENT UTIS: ICD-10-CM

## 2024-06-26 PROCEDURE — 99214 OFFICE O/P EST MOD 30 MIN: CPT | Mod: PBBFAC | Performed by: INTERNAL MEDICINE

## 2024-06-26 PROCEDURE — 99999 PR PBB SHADOW E&M-EST. PATIENT-LVL IV: CPT | Mod: PBBFAC,,, | Performed by: INTERNAL MEDICINE

## 2024-06-26 RX ORDER — SODIUM CHLORIDE 0.9 % (FLUSH) 0.9 %
SYRINGE (ML) INJECTION
COMMUNITY
Start: 2024-04-10

## 2024-06-26 RX ORDER — MEROPENEM AND SODIUM CHLORIDE 1 G/50ML
INJECTION, SOLUTION INTRAVENOUS
COMMUNITY
Start: 2024-06-14

## 2024-06-26 RX ORDER — SODIUM CHLORIDE 0.9 % (FLUSH) 0.9 %
SYRINGE (ML) INJECTION
COMMUNITY
Start: 2024-06-14

## 2024-06-26 RX ORDER — HEPARIN SODIUM,PORCINE/PF 10 UNIT/ML
SYRINGE (ML) INTRAVENOUS
COMMUNITY
Start: 2024-06-14

## 2024-06-26 RX ORDER — NITROFURANTOIN MACROCRYSTALS 50 MG/1
CAPSULE ORAL
COMMUNITY
Start: 2024-05-08

## 2024-06-26 NOTE — PROGRESS NOTES
Subjective     Patient ID: Annette Garcia is a 88 y.o. female.    Chief Complaint:Frequent Infections      History of Present Illness    Ms. Garcia is a pleasant 87 yo woman, a resident of [facility], who presents with her daughter for recurrent UTIs. Her daughter states that she gets at least one episode per month and requires IV abx. Last admission at [facility] last month - completed 7 days of cefepime. Was seen by Urology and started on Estrace. Patient has urinary retention but is declining Martinez. No dysuria, fever, or chills.     Review of Systems   Constitutional: Negative for chills and fever.   Genitourinary:  Negative for dysuria and flank pain.   All other systems reviewed and are negative.     Objective   Physical Exam  Vitals and nursing note reviewed.   Constitutional:       General: She is not in acute distress.     Appearance: Normal appearance. She is not ill-appearing, toxic-appearing or diaphoretic.   HENT:      Head: Normocephalic and atraumatic.      Mouth/Throat:      Mouth: Mucous membranes are moist.   Eyes:      Pupils: Pupils are equal, round, and reactive to light.   Cardiovascular:      Rate and Rhythm: Normal rate.   Abdominal:      Tenderness: There is no right CVA tenderness or left CVA tenderness.   Neurological:      General: No focal deficit present.      Mental Status: She is alert.   Psychiatric:         Mood and Affect: Mood normal.         Behavior: Behavior normal.         Thought Content: Thought content normal.         Judgment: Judgment normal.        Assessment and Plan     Recurrent UTIs (not currently infected)  Ms. Garcia is a 87 yo woman with recurrent UTIs. She suffers with urinary retention which predisposes her and is followed by Urology.  Discussed strategies to reduce infections such as increasing water consumption, the use of cranberry products, as well as D-mannose.  Given her urinary retention, she is at risk for recurrent infections.  I recommend  only treating truly symptomatic, culture-proven episodes of acute bacterial cystitis with as short a duration of antibiotics, no more than 7 days.

## 2024-09-02 NOTE — ED NOTES
Bed: EXAM 13  Expected date:   Expected time:   Means of arrival:   Comments:  [facility]    Speaking Coherently

## 2025-06-07 NOTE — SUBJECTIVE & OBJECTIVE
Interval History: Patient with no complaints today - doing well off the ventilator.     Review of Systems   Respiratory: Negative for shortness of breath.    Gastrointestinal: Negative for diarrhea, nausea and vomiting.     Antibiotics (From admission, onward)    Start     Stop Route Frequency Ordered    11/03/19 1615  metronidazole IVPB 500 mg      -- IV Every 8 hours (non-standard times) 11/03/19 1506    11/03/19 1045  ceFAZolin (ANCEF) 1 gram in dextrose 5 % 50 mL IVPB (premix)      -- IV Every 12 hours (non-standard times) 11/03/19 0939        Objective:     Vital Signs (Most Recent):  Temp: 99 °F (37.2 °C) (11/03/19 1500)  Pulse: 92 (11/03/19 1400)  Resp: 20 (11/03/19 1400)  BP: (!) 147/66 (11/03/19 1400)  SpO2: 96 % (11/03/19 1400) Vital Signs (24h Range):  Temp:  [96.8 °F (36 °C)-99.2 °F (37.3 °C)] 99 °F (37.2 °C)  Pulse:  [76-95] 92  Resp:  [20-32] 20  SpO2:  [92 %-100 %] 96 %  BP: (111-173)/() 147/66     Weight: 70.6 kg (155 lb 10.3 oz)  Body mass index is 30.4 kg/m².    Estimated Creatinine Clearance: 19 mL/min (A) (based on SCr of 2 mg/dL (H)).    Physical Exam   Cardiovascular: Normal heart sounds.   No murmur heard.  Pulmonary/Chest: Breath sounds normal. No respiratory distress.   Abdominal: Bowel sounds are normal. She exhibits no distension. There is no tenderness.   Has colostomy in place and abdominal wound looks clean and dry   Musculoskeletal: She exhibits no edema.       Significant Labs:   Blood Culture:   Recent Labs   Lab 10/28/19  0049 11/01/19  1335 11/01/19  1516   LABBLOO Gram stain aer bottle: Gram negative rods   Results called to and read back by: Kinga Bateman RN  10/28/2019  19:01  ESCHERICHIA COLI* No Growth to date  No Growth to date No Growth to date  No Growth to date     CBC:   Recent Labs   Lab 11/02/19  0430 11/02/19 1952 11/03/19 0349   WBC 10.84 10.26 9.69   HGB 8.3* 8.3* 8.0*   HCT 24.0* 24.6* 23.9*   PLT 41* 47* 59*     CMP:   Recent Labs   Lab 11/02/19  1676  Lexiscan stress test:  1. Infarct involving the left circumflex territory/lateral wall with moderate  sonam-infarct ischemia.  2. Normal left ventricle systolic function with ejection fraction of 55%.      Recent Cardiology visit Feb 2025, considered stable  Avoided beta blocker/ACE-I with hx of orthostatic hypotension.   No acute cardiac complaints, seems stable  Continue aspirin, statin   19  1411 19  0349   *  127* 132* 134*  134* 135*  135*   K 4.3  4.3 3.8 3.8  3.8 3.8  3.8   CL 98  98 99 102  102 103  103   CO2 20*  20* 23 23  23 23  23   *  214* 223* 198*  198* 190*  190*   BUN 86*  86* 60* 54*  54* 66*  66*   CREATININE 2.7*  2.7* 1.7* 1.6*  1.6* 2.0*  2.0*   CALCIUM 7.3*  7.3* 7.8* 7.7*  7.7* 7.5*  7.5*   PROT 4.5*  --   --  4.4*   ALBUMIN 1.8* 1.9* 1.8*  1.8* 1.8*  1.8*  1.8*   BILITOT 0.9  --   --  0.6   ALKPHOS 79  --   --  117   AST 51*  --   --  50*   ALT 22  --   --  23   ANIONGAP 9  9 10 9  9 9  9   EGFRNONAA 16*  16* 28* 30*  30* 23*  23*     Urine Studies:   Recent Labs   Lab 10/28/19  1724   COLORU [place]*   APPEARANCEUA Clear   PHUR 5.0   SPECGRAV 1.025   PROTEINUA 2+*   GLUCUA Negative   KETONESU 1+*   BILIRUBINUA 2+*   OCCULTUA Trace*   NITRITE Positive*   UROBILINOGEN 2.0-3.0*   LEUKOCYTESUR Negative   RBCUA 10*   WBCUA 5   BACTERIA Many*   SQUAMEPITHEL 2   HYALINECASTS 0     Significant Imagin/1 cxr - Right internal jugular line has its tip in the superior vena cava.  Enteric tube approximately 3 cm above jesús level.  Additional tube/line appear unchanged.  No change in the cardiopulmonary status.

## (undated) DEVICE — WIRE MANDRIL 98/60CM

## (undated) DEVICE — KIT LEFT HEART MANIFOLD CUSTOM

## (undated) DEVICE — GUIDEWIRE AMPLATZ STIFF 260X7

## (undated) DEVICE — ELECTRODE REM PLYHSV RETURN 9

## (undated) DEVICE — TRAY FOLEY 16FR INFECTION CONT

## (undated) DEVICE — CATH NAVICROSS .035X150 ANGLE

## (undated) DEVICE — CLAMP DRAINAGE POUCH

## (undated) DEVICE — ADHESIVE DERMABOND ADVANCED

## (undated) DEVICE — PAD PINK TRENDELENBURG POS XL

## (undated) DEVICE — OBTURATOR BLADELESS 8MM XI CLR

## (undated) DEVICE — GUIDEWIRE V-18 CONTROL .18

## (undated) DEVICE — Device

## (undated) DEVICE — SUT VLOC BLU GS-22 SZ0 18IN

## (undated) DEVICE — SUT CTD VICRYL 3-0 CR/SH

## (undated) DEVICE — STAPLER INTERNL CONTOR CV BLU

## (undated) DEVICE — SUT SILK 0 STRANDS 30IN BLK

## (undated) DEVICE — CONTAINER PATHOLOGY W/LID 32OZ

## (undated) DEVICE — SEAL UNIVERSAL 5MM-8MM XI

## (undated) DEVICE — SEALER LIGASURE IMPACT 18CM

## (undated) DEVICE — NDL HYPO REG 25G X 1 1/2

## (undated) DEVICE — GUIDEWIRE EMERALD 150CM PTFE

## (undated) DEVICE — SEE MEDLINE ITEM 157148

## (undated) DEVICE — SET MICRO PUNCT 4FR/MPIS-401

## (undated) DEVICE — SEALER VESSEL EXTEND

## (undated) DEVICE — KIT INTRODUCER W/GUIDEWIRE

## (undated) DEVICE — TUBING INSUFFLATION W/LUER LOK

## (undated) DEVICE — SUT 1 48IN PDS II VIO MONO

## (undated) DEVICE — SPONGE LAP 18X18 PREWASHED

## (undated) DEVICE — STAPLER SKIN ROTATING HEAD

## (undated) DEVICE — DRAPE ARM DAVINCI XI

## (undated) DEVICE — NDL 22GA X1 1/2 REG BEVEL

## (undated) DEVICE — CATH ELECTRODE BLLN 5FR 110CM

## (undated) DEVICE — APPLICATOR CHLORAPREP ORN 26ML

## (undated) DEVICE — PACK SURGERY START

## (undated) DEVICE — SUT SILK 2-0 STRANDS 30IN

## (undated) DEVICE — CUTTER PROXIMATE BLUE 75MM

## (undated) DEVICE — VISIPAQUE 320 200ML +PK

## (undated) DEVICE — CATH VISIONS PV IVUS .035

## (undated) DEVICE — SHEATH INTRODUCER 5FR 10CM

## (undated) DEVICE — SEE MEDLINE ITEM 157116

## (undated) DEVICE — COVER OVERHEAD SURG LT BLUE

## (undated) DEVICE — CATH ULTRAVERSE 035 12X60X75

## (undated) DEVICE — GUIDEWIRE ADVNTG 035X260CM ANG

## (undated) DEVICE — GLOVE SURGICAL LATEX SZ 7

## (undated) DEVICE — SLEEVE INTRODUCER CATH 60CM ST

## (undated) DEVICE — SUT STRATAFIX 0 PDS+ 23CM 9IN

## (undated) DEVICE — COVER MAYO STND XL 30X57IN

## (undated) DEVICE — COVER PROBE US 5.5X58L NON LTX

## (undated) DEVICE — INFLATOR ENCORE 26 BLLN INFL

## (undated) DEVICE — COVER TIP CURVED SCISSORS XI

## (undated) DEVICE — SUT 0 VICRYL / UR6 (J603)

## (undated) DEVICE — KIT GLIDESHEATH SLEND 6FR 10CM

## (undated) DEVICE — DRESSING AQUACEL AG 3.5X10IN

## (undated) DEVICE — SEE MEDLINE ITEM 156955

## (undated) DEVICE — RELOAD PROXIMATE CUT BLUE 75MM

## (undated) DEVICE — APPLIER LIGACLIP LG 13IN

## (undated) DEVICE — GAUZE SPONGE 4X4 12PLY

## (undated) DEVICE — SEE MEDLINE ITEM 157187

## (undated) DEVICE — CATH MPA2 INFINITI 4FR 100CM

## (undated) DEVICE — COVER LIGHT HANDLE 80/CA

## (undated) DEVICE — PAD DEFIB CADENCE ADULT R2

## (undated) DEVICE — SEE MEDLINE ITEM 157117

## (undated) DEVICE — SHEATH CLOTTRIEVER 13FR

## (undated) DEVICE — CABLE PACER

## (undated) DEVICE — PACK BASIC

## (undated) DEVICE — VISE RADIFOCUS MULTI TORQUE

## (undated) DEVICE — MANIFOLD 4 PORT

## (undated) DEVICE — SYR 10CC LUER LOCK

## (undated) DEVICE — SYS SEE SHARP SCP ANTIFG LNG

## (undated) DEVICE — CATH IMPULSE D6F MPA-2 5BX

## (undated) DEVICE — GOWN POLY REINF BRTH SLV LG

## (undated) DEVICE — ADHESIVE MASTISOL VIAL 48/BX

## (undated) DEVICE — SHEATH INTRODUCER 8FR 11CM

## (undated) DEVICE — SUPPORT ULNA NERVE PROTECTOR

## (undated) DEVICE — DEVICE INDEFLATOR BASIX

## (undated) DEVICE — POUCH SQUIBB ACTIVE LIF

## (undated) DEVICE — TOWEL OR DISP STRL BLUE 4/PK

## (undated) DEVICE — BLLN ATLAS GOLD 16 X 60

## (undated) DEVICE — DRAPE FLUID WARMER ORS 44X44IN

## (undated) DEVICE — SUT VICRYL 3-0 27 SH

## (undated) DEVICE — SUT V-LOC 90 UD GS22 2-0 9IN

## (undated) DEVICE — SYS SEE SHARP SCOPE ANTIFOG

## (undated) DEVICE — SUT 2/0 30IN SILK BLK BRAI

## (undated) DEVICE — SUT VICRYL CTD 2-0 GI 27 SH

## (undated) DEVICE — DRESSING AQUACEL SACRAL 9 X 9

## (undated) DEVICE — TAPE RADIOPLAQUE VIPER TRAC

## (undated) DEVICE — DRAPE LAP T SHT W/ INSTR PAD

## (undated) DEVICE — SUT MCRYL PLUS 4-0 PS2 27IN

## (undated) DEVICE — GLOVE ULTRATOUCH PLYSPHRN 7.5

## (undated) DEVICE — CATH 5FR BALLOON PT HEART PACE

## (undated) DEVICE — DEVICE PERCLOSE SUT CLSR 6FR

## (undated) DEVICE — CANNULA REDUCER 12-8MM

## (undated) DEVICE — CATH CLOTTRIEVER 13FR 16MM

## (undated) DEVICE — KIT WING PAD POSITIONING

## (undated) DEVICE — DRESSING XEROFORM FOIL PK 1X8